# Patient Record
Sex: MALE | Race: WHITE | NOT HISPANIC OR LATINO | Employment: FULL TIME | URBAN - METROPOLITAN AREA
[De-identification: names, ages, dates, MRNs, and addresses within clinical notes are randomized per-mention and may not be internally consistent; named-entity substitution may affect disease eponyms.]

---

## 2017-01-09 RX ORDER — OMEPRAZOLE 20 MG/1
CAPSULE, DELAYED RELEASE ORAL
Qty: 90 CAPSULE | Refills: 0 | Status: SHIPPED | OUTPATIENT
Start: 2017-01-09 | End: 2017-04-16 | Stop reason: SDUPTHER

## 2017-01-09 RX ORDER — FENOFIBRATE 160 MG/1
TABLET ORAL
Qty: 90 TABLET | Refills: 0 | Status: SHIPPED | OUTPATIENT
Start: 2017-01-09 | End: 2017-04-01 | Stop reason: SDUPTHER

## 2017-01-23 RX ORDER — ALLOPURINOL 300 MG/1
TABLET ORAL
Qty: 90 TABLET | Refills: 0 | Status: SHIPPED | OUTPATIENT
Start: 2017-01-23 | End: 2017-04-30 | Stop reason: SDUPTHER

## 2017-03-06 ENCOUNTER — OFFICE VISIT (OUTPATIENT)
Dept: INTERNAL MEDICINE | Facility: CLINIC | Age: 82
End: 2017-03-06

## 2017-03-06 VITALS
SYSTOLIC BLOOD PRESSURE: 136 MMHG | RESPIRATION RATE: 18 BRPM | HEART RATE: 56 BPM | BODY MASS INDEX: 33.25 KG/M2 | DIASTOLIC BLOOD PRESSURE: 70 MMHG | TEMPERATURE: 97.8 F | WEIGHT: 266 LBS

## 2017-03-06 DIAGNOSIS — K21.00 GASTROESOPHAGEAL REFLUX DISEASE WITH ESOPHAGITIS: ICD-10-CM

## 2017-03-06 DIAGNOSIS — E11.9 TYPE 2 DIABETES MELLITUS WITHOUT COMPLICATION, WITHOUT LONG-TERM CURRENT USE OF INSULIN (HCC): ICD-10-CM

## 2017-03-06 DIAGNOSIS — E78.5 HYPERLIPIDEMIA, UNSPECIFIED HYPERLIPIDEMIA TYPE: ICD-10-CM

## 2017-03-06 DIAGNOSIS — I10 ESSENTIAL HYPERTENSION: Primary | ICD-10-CM

## 2017-03-06 LAB
ALBUMIN SERPL-MCNC: 4.2 G/DL (ref 3.2–4.8)
ALBUMIN/GLOB SERPL: 1.4 G/DL (ref 1.5–2.5)
ALP SERPL-CCNC: 74 U/L (ref 25–100)
ALT SERPL W P-5'-P-CCNC: 33 U/L (ref 7–40)
ANION GAP SERPL CALCULATED.3IONS-SCNC: 7 MMOL/L (ref 3–11)
ARTICHOKE IGE QN: 72 MG/DL (ref 0–130)
AST SERPL-CCNC: 30 U/L (ref 0–33)
BILIRUB BLD-MCNC: NEGATIVE MG/DL
BILIRUB SERPL-MCNC: 0.7 MG/DL (ref 0.3–1.2)
BUN BLD-MCNC: 27 MG/DL (ref 9–23)
BUN/CREAT SERPL: 30 (ref 7–25)
CALCIUM SPEC-SCNC: 9.9 MG/DL (ref 8.7–10.4)
CHLORIDE SERPL-SCNC: 104 MMOL/L (ref 99–109)
CHOLEST SERPL-MCNC: 117 MG/DL (ref 0–200)
CLARITY, POC: CLEAR
CO2 SERPL-SCNC: 29 MMOL/L (ref 20–31)
COLOR UR: YELLOW
CREAT BLD-MCNC: 0.9 MG/DL (ref 0.6–1.3)
EXPIRATION DATE: NORMAL
EXPIRATION DATE: NORMAL
GFR SERPL CREATININE-BSD FRML MDRD: 81 ML/MIN/1.73
GLOBULIN UR ELPH-MCNC: 3 GM/DL
GLUCOSE BLD-MCNC: 155 MG/DL (ref 70–100)
GLUCOSE UR STRIP-MCNC: NEGATIVE MG/DL
HBA1C MFR BLD: 7.2 % (ref 4.8–5.6)
HDLC SERPL-MCNC: 27 MG/DL (ref 40–60)
KETONES UR QL: NEGATIVE
LEUKOCYTE EST, POC: NEGATIVE
Lab: NORMAL
Lab: NORMAL
NITRITE UR-MCNC: NEGATIVE MG/ML
PH UR: 5 [PH] (ref 5–8)
POC CREATININE URINE: 50
POC MICROALBUMIN URINE: 30
POTASSIUM BLD-SCNC: 4.1 MMOL/L (ref 3.5–5.5)
PROT SERPL-MCNC: 7.2 G/DL (ref 5.7–8.2)
PROT UR STRIP-MCNC: NEGATIVE MG/DL
RBC # UR STRIP: NEGATIVE /UL
SODIUM BLD-SCNC: 140 MMOL/L (ref 132–146)
SP GR UR: 1.02 (ref 1–1.03)
TRIGL SERPL-MCNC: 263 MG/DL (ref 0–150)
UROBILINOGEN UR QL: NORMAL

## 2017-03-06 PROCEDURE — 80061 LIPID PANEL: CPT | Performed by: INTERNAL MEDICINE

## 2017-03-06 PROCEDURE — 82044 UR ALBUMIN SEMIQUANTITATIVE: CPT | Performed by: INTERNAL MEDICINE

## 2017-03-06 PROCEDURE — 83036 HEMOGLOBIN GLYCOSYLATED A1C: CPT | Performed by: INTERNAL MEDICINE

## 2017-03-06 PROCEDURE — 36415 COLL VENOUS BLD VENIPUNCTURE: CPT | Performed by: INTERNAL MEDICINE

## 2017-03-06 PROCEDURE — 99214 OFFICE O/P EST MOD 30 MIN: CPT | Performed by: INTERNAL MEDICINE

## 2017-03-06 PROCEDURE — 80053 COMPREHEN METABOLIC PANEL: CPT | Performed by: INTERNAL MEDICINE

## 2017-03-06 PROCEDURE — 81003 URINALYSIS AUTO W/O SCOPE: CPT | Performed by: INTERNAL MEDICINE

## 2017-03-06 NOTE — PROGRESS NOTES
Chief Complaint   Patient presents with   • Follow-up     4 MONTH       History of Present Illness      The patient presents for a follow-up related to diabetes and reports that he doesn't check his blood sugars at home. He denies hypoglycemic symptoms. The patient denies polyuria, polydypsia or polyphagia. The patient had an eye examination in 2013. He usually sees an ophthalmologist for his eye examinations. The eye examinations have revealed no retinopathy. He reports no symptoms of neuropathy. The patient takes his medication as prescribed. He is not taking insulin. The patient does check his feet daily at home. He denies chest pain, shortness of breath, dyspnea on exertion, edema, palpitations or syncope.    The patient is on omeprazole for his gastroesophageal reflux. The medication is taken on a regular basis and gives complete relief of the symptoms. He reports no abdominal pain, belching, diarrhea, dysphagia, heartburn, nausea, rectal bleeding, vomiting or weight loss. The GERD has no known aggravating factors.    The patient presents for a follow-up related to hyperlipidemia. He is following a low fat diet. He reports that he is exercising. He is taking pravastatin. The patient is taking his medication as prescribed. He reports no medication side effects, including muscle cramps, abdominal pain, headaches or weakness.    The patient presents for a follow-up related to hypertension. The patient reports that he has had no headaches or blurred vision. He states that he is taking his medication as prescribed. He is not having medication side effects.    Review of Systems    CARDIOVASCULAR- Denies Claudication.    PULMONARY- Denies Wheezing, Sputum Production, Cough or Hemoptysis.    Medications      Current Outpatient Prescriptions:   •  allopurinol (ZYLOPRIM) 300 MG tablet, TAKE ONE TABLET BY MOUTH ONCE DAILY, Disp: 90 tablet, Rfl: 0  •  amLODIPine (NORVASC) 10 MG tablet, Take 1 tablet by mouth daily for 360  days., Disp: 90 tablet, Rfl: 3  •  apixaban (ELIQUIS) 5 MG tablet tablet, Take 1 tablet by mouth 2 (two) times a day for 360 days., Disp: 180 tablet, Rfl: 3  •  Ascorbic Acid (VITAMIN C) 500 MG capsule, Take 1 tablet by mouth 4 (four) times a day., Disp: , Rfl:   •  docusate sodium (COLACE) 100 MG capsule, Take 300 mg by mouth every night., Disp: , Rfl:   •  fenofibrate 160 MG tablet, TAKE ONE TABLET BY MOUTH ONCE DAILY, Disp: 90 tablet, Rfl: 0  •  Ferrous Gluconate (IRON) 240 (27 FE) MG tablet, Take 1 tablet by mouth daily., Disp: , Rfl:   •  finasteride (PROSCAR) 5 MG tablet, Take 5 mg by mouth every night., Disp: , Rfl:   •  hydrochlorothiazide (MICROZIDE) 12.5 MG capsule, Take 1 capsule by mouth Every Morning., Disp: 90 capsule, Rfl: 3  •  lisinopril (PRINIVIL,ZESTRIL) 40 MG tablet, Take 1 tablet by mouth daily for 360 days., Disp: 90 tablet, Rfl: 3  •  metFORMIN (GLUCOPHAGE) 1000 MG tablet, TAKE ONE TABLET BY MOUTH TWICE DAILY, Disp: 60 tablet, Rfl: 5  •  methenamine (HIPREX) 1 G tablet, Take 500 mg by mouth 2 (two) times a day., Disp: , Rfl:   •  omeprazole (priLOSEC) 20 MG capsule, TAKE ONE CAPSULE BY MOUTH ONCE DAILY, Disp: 90 capsule, Rfl: 0  •  pravastatin (PRAVACHOL) 40 MG tablet, Take 1 tablet by mouth daily for 360 days., Disp: 90 tablet, Rfl: 3  •  sotalol (BETAPACE AF) 80 MG tablet tablet, Take 1 tablet by mouth every 12 (twelve) hours for 360 days., Disp: 180 tablet, Rfl: 3  •  tamsulosin (FLOMAX) 0.4 MG capsule 24 hr capsule, Take 1 capsule by mouth daily., Disp: , Rfl:      Allergies    Allergies   Allergen Reactions   • Penicillins Hives       Problem List    Patient Active Problem List   Diagnosis   • Atopic rhinitis   • Arrhythmia   • Benign (familial) paraproteinemia   • Diabetes mellitus   • Enlarged prostate without lower urinary tract symptoms (luts)   • Gastroesophageal reflux disease with esophagitis   • Polyp of gallbladder   • Gout   • Hematuria   • Liver cyst   • Hyperlipidemia   •  Essential hypertension   • Nephrolithiasis   • Obstructive sleep apnea syndrome   • Paroxysmal atrial fibrillation   • Ingrowing toenail       Physical Examination    Visit Vitals   • /70 (BP Location: Left arm, Patient Position: Sitting, Cuff Size: Adult)   • Pulse 56   • Temp 97.8 °F (36.6 °C) (Temporal Artery )   • Resp 18   • Wt 266 lb (121 kg)   • BMI 33.25 kg/m2       HEENT: Head- Normocephalic Atraumatic. Facies- Within normal limits. Pinnas- Normal texture and shape bilaterally. Canals- Normal bilaterally. TMs- Normal bilaterally. Nares- Patent bilaterally. Nasal Septum- is normal. There is no tenderness to palpation over the frontal or maxillary sinuses. Lids- Normal bilaterally. Conjunctiva- Clear bilaterally. Sclera- Anicteric bilaterally. Oropharynx- Moist with no lesions. Tonsils- No enlargement, erythema or exudate.    Neck: Thyroid- non enlarged, symmetric and has no nodules. No bruits are detected. ROM- Normal Range of Motion with no rigidity.    Lungs: Auscultation- Clear to auscultation bilaterally. There are no retractions, clubbing or cyanosis. The Expiratory to Inspiratory ratio is equal.    Cardiovascular: There are no carotid bruits. Heart- Normal Rate with Regular rhythm and no murmurs. There are no gallops. There are no rubs. In the lower extremities there is no edema. The upper extremities do not have edema.    Abdomen: Soft, benign, non-tender with no masses, hernias, organomegaly or scars.    Feet: The feet are symmetric with normal ericka landmarks. There is no tenderness to palpation bilaterally. The feet have normal posterior tibial and dorsalis pedis pulses and normal capillary refill bilaterally. The monofilament examination is normal bilaterally.       The arches are normal bilaterally. There are no skin or nail lesions present. There are no ingrown nails. There are no bunions noted.    Impression and Assessment    Diabetes Mellitus Type 2- controlled.    Gastroesophageal  Reflux Disease.     Hyperlipidemia.     Hypertension.     Plan    Gastroesophageal Reflux Disease Plan: The current plan was continued.    Hyperlipidemia Plan: He was instructed to eat a low fat diet. He was encouraged to exercise daily. The patient was instructed to continue the current medications.    Hypertension Plan: The current plan was continued.    Diabetes Mellitus Type 2- controlled Plan: He was referred to ophthalmology. The patient was instructed to continue the current medications.    Darien was seen today for follow-up.    Diagnoses and all orders for this visit:    Essential hypertension  -     Comprehensive Metabolic Panel  -     POC Urinalysis Dipstick, Automated    Gastroesophageal reflux disease with esophagitis    Type 2 diabetes mellitus without complication, without long-term current use of insulin  -     Comprehensive Metabolic Panel  -     Hemoglobin A1c  -     POC Urinalysis Dipstick, Automated  -     POC Microalbumin  -     Ambulatory Referral to Ophthalmology    Hyperlipidemia, unspecified hyperlipidemia type  -     Comprehensive Metabolic Panel  -     Lipid Panel        Return to Office    The patient was instructed to return for follow-up in 4 months. Next Visit: Follow-up.    The patient was instructed to return sooner if the condition changes, worsens, or doesn't resolve.

## 2017-04-03 RX ORDER — FENOFIBRATE 160 MG/1
TABLET ORAL
Qty: 90 TABLET | Refills: 0 | Status: SHIPPED | OUTPATIENT
Start: 2017-04-03 | End: 2017-07-08 | Stop reason: SDUPTHER

## 2017-04-17 RX ORDER — OMEPRAZOLE 20 MG/1
CAPSULE, DELAYED RELEASE ORAL
Qty: 90 CAPSULE | Refills: 0 | Status: SHIPPED | OUTPATIENT
Start: 2017-04-17 | End: 2017-07-08 | Stop reason: SDUPTHER

## 2017-05-01 RX ORDER — ALLOPURINOL 300 MG/1
TABLET ORAL
Qty: 90 TABLET | Refills: 0 | Status: SHIPPED | OUTPATIENT
Start: 2017-05-01 | End: 2017-07-22 | Stop reason: SDUPTHER

## 2017-07-10 RX ORDER — OMEPRAZOLE 20 MG/1
CAPSULE, DELAYED RELEASE ORAL
Qty: 90 CAPSULE | Refills: 0 | Status: SHIPPED | OUTPATIENT
Start: 2017-07-10 | End: 2017-10-14 | Stop reason: SDUPTHER

## 2017-07-10 RX ORDER — FENOFIBRATE 160 MG/1
TABLET ORAL
Qty: 90 TABLET | Refills: 0 | Status: SHIPPED | OUTPATIENT
Start: 2017-07-10 | End: 2017-08-22

## 2017-07-20 PROBLEM — N18.9 CHRONIC KIDNEY DISEASE: Status: ACTIVE | Noted: 2017-07-20

## 2017-07-24 RX ORDER — ALLOPURINOL 300 MG/1
TABLET ORAL
Qty: 90 TABLET | Refills: 0 | Status: SHIPPED | OUTPATIENT
Start: 2017-07-24 | End: 2017-11-06 | Stop reason: SDUPTHER

## 2017-07-31 ENCOUNTER — OFFICE VISIT (OUTPATIENT)
Dept: INTERNAL MEDICINE | Facility: CLINIC | Age: 82
End: 2017-07-31

## 2017-07-31 VITALS
RESPIRATION RATE: 16 BRPM | SYSTOLIC BLOOD PRESSURE: 122 MMHG | TEMPERATURE: 97.7 F | HEART RATE: 52 BPM | DIASTOLIC BLOOD PRESSURE: 64 MMHG | WEIGHT: 261.2 LBS | BODY MASS INDEX: 32.65 KG/M2

## 2017-07-31 DIAGNOSIS — E11.9 TYPE 2 DIABETES MELLITUS WITHOUT COMPLICATION, WITHOUT LONG-TERM CURRENT USE OF INSULIN (HCC): ICD-10-CM

## 2017-07-31 DIAGNOSIS — I10 ESSENTIAL HYPERTENSION: Primary | ICD-10-CM

## 2017-07-31 DIAGNOSIS — E78.5 HYPERLIPIDEMIA, UNSPECIFIED HYPERLIPIDEMIA TYPE: ICD-10-CM

## 2017-07-31 DIAGNOSIS — K21.00 GASTROESOPHAGEAL REFLUX DISEASE WITH ESOPHAGITIS: ICD-10-CM

## 2017-07-31 LAB
ALBUMIN SERPL-MCNC: 4.3 G/DL (ref 3.2–4.8)
ALBUMIN/GLOB SERPL: 1.6 G/DL (ref 1.5–2.5)
ALP SERPL-CCNC: 63 U/L (ref 25–100)
ALT SERPL W P-5'-P-CCNC: 32 U/L (ref 7–40)
ANION GAP SERPL CALCULATED.3IONS-SCNC: 5 MMOL/L (ref 3–11)
ARTICHOKE IGE QN: 66 MG/DL (ref 0–130)
AST SERPL-CCNC: 27 U/L (ref 0–33)
BILIRUB SERPL-MCNC: 0.7 MG/DL (ref 0.3–1.2)
BUN BLD-MCNC: 29 MG/DL (ref 9–23)
BUN/CREAT SERPL: 32.2 (ref 7–25)
CALCIUM SPEC-SCNC: 9.8 MG/DL (ref 8.7–10.4)
CHLORIDE SERPL-SCNC: 107 MMOL/L (ref 99–109)
CHOLEST SERPL-MCNC: 111 MG/DL (ref 0–200)
CO2 SERPL-SCNC: 29 MMOL/L (ref 20–31)
CREAT BLD-MCNC: 0.9 MG/DL (ref 0.6–1.3)
GFR SERPL CREATININE-BSD FRML MDRD: 81 ML/MIN/1.73
GLOBULIN UR ELPH-MCNC: 2.7 GM/DL
GLUCOSE BLD-MCNC: 137 MG/DL (ref 70–100)
HBA1C MFR BLD: 6.7 % (ref 4.8–5.6)
HDLC SERPL-MCNC: 25 MG/DL (ref 40–60)
POTASSIUM BLD-SCNC: 4.3 MMOL/L (ref 3.5–5.5)
PROT SERPL-MCNC: 7 G/DL (ref 5.7–8.2)
SODIUM BLD-SCNC: 141 MMOL/L (ref 132–146)
TRIGL SERPL-MCNC: 179 MG/DL (ref 0–150)

## 2017-07-31 PROCEDURE — 36415 COLL VENOUS BLD VENIPUNCTURE: CPT | Performed by: INTERNAL MEDICINE

## 2017-07-31 PROCEDURE — 83036 HEMOGLOBIN GLYCOSYLATED A1C: CPT | Performed by: INTERNAL MEDICINE

## 2017-07-31 PROCEDURE — 99214 OFFICE O/P EST MOD 30 MIN: CPT | Performed by: INTERNAL MEDICINE

## 2017-07-31 PROCEDURE — 80061 LIPID PANEL: CPT | Performed by: INTERNAL MEDICINE

## 2017-07-31 PROCEDURE — 80053 COMPREHEN METABOLIC PANEL: CPT | Performed by: INTERNAL MEDICINE

## 2017-07-31 NOTE — PROGRESS NOTES
Chief Complaint   Patient presents with   • Follow-up     4 month       History of Present Illness    The patient presents for a follow-up related to hyperlipidemia. He is following a low fat diet. He reports that he is exercising. He is taking fenofibrate and pravastatin. The patient is taking his medication as prescribed. He reports no medication side effects, including muscle cramps, abdominal pain, headaches or weakness. He denies chest pain, shortness of breath, orthopnea, paroxysmal nocturnal dyspnea, dyspnea on exertion, edema, palpitations or syncope.    The patient presents for a follow-up related to hypertension. The patient reports that he has had no headaches or blurred vision. He states that he is taking his medication as prescribed. He is not having medication side effects. He checks his blood pressures at home. The home readings are normal.    The patient is on omeprazole for his gastroesophageal reflux. The medication is taken on a regular basis and gives complete relief of the symptoms. He reports no abdominal pain, belching, diarrhea, dysphagia, early satiety, heartburn, hoarseness, nausea, odynophagia, rectal bleeding, vomiting or weight loss. The GERD has no known aggravating factors.    The patient presents for a follow-up related to diabetes and reports that he checks his blood sugars at home. His sugars are checked infrequently. The average sugars are in the 100-150 range. He denies hypoglycemic symptoms. The patient denies polyuria, polydypsia or polyphagia. The patient had an eye examination in 2017. He usually sees an ophthalmologist for his eye examinations. The eye examinations have revealed no retinopathy. He reports no symptoms of neuropathy. The patient takes his medication as prescribed. He is not taking insulin. The patient does check his feet daily at home.    Review of Systems    GENERAL- Denies Fever, Chills, Sweats, Fatigue, Weakness or Malaise.    CARDIOVASCULAR- Denies  Claudication.    GASTROINTESTINAL- Denies Constipation.    PULMONARY- Denies Wheezing, Sputum Production, Cough, Hemoptysis or Pleuritic Chest Pain.    Medications      Current Outpatient Prescriptions:   •  allopurinol (ZYLOPRIM) 300 MG tablet, TAKE ONE TABLET BY MOUTH ONCE DAILY, Disp: 90 tablet, Rfl: 0  •  amLODIPine (NORVASC) 10 MG tablet, Take 1 tablet by mouth daily for 360 days., Disp: 90 tablet, Rfl: 3  •  apixaban (ELIQUIS) 5 MG tablet tablet, Take 1 tablet by mouth 2 (two) times a day for 360 days., Disp: 180 tablet, Rfl: 3  •  Ascorbic Acid (VITAMIN C) 500 MG capsule, Take 1 tablet by mouth 4 (four) times a day., Disp: , Rfl:   •  docusate sodium (COLACE) 100 MG capsule, Take 300 mg by mouth every night., Disp: , Rfl:   •  fenofibrate 160 MG tablet, TAKE ONE TABLET BY MOUTH ONCE DAILY, Disp: 90 tablet, Rfl: 0  •  Ferrous Gluconate (IRON) 240 (27 FE) MG tablet, Take 1 tablet by mouth daily., Disp: , Rfl:   •  finasteride (PROSCAR) 5 MG tablet, Take 5 mg by mouth every night., Disp: , Rfl:   •  hydrochlorothiazide (MICROZIDE) 12.5 MG capsule, Take 1 capsule by mouth Every Morning., Disp: 90 capsule, Rfl: 3  •  lisinopril (PRINIVIL,ZESTRIL) 40 MG tablet, Take 1 tablet by mouth daily for 360 days., Disp: 90 tablet, Rfl: 3  •  metFORMIN (GLUCOPHAGE) 1000 MG tablet, TAKE ONE TABLET BY MOUTH TWICE DAILY, Disp: 60 tablet, Rfl: 5  •  methenamine (HIPREX) 1 G tablet, Take 500 mg by mouth 2 (two) times a day., Disp: , Rfl:   •  omeprazole (priLOSEC) 20 MG capsule, TAKE ONE CAPSULE BY MOUTH ONCE DAILY, Disp: 90 capsule, Rfl: 0  •  pravastatin (PRAVACHOL) 40 MG tablet, Take 1 tablet by mouth daily for 360 days., Disp: 90 tablet, Rfl: 3  •  tamsulosin (FLOMAX) 0.4 MG capsule 24 hr capsule, Take 1 capsule by mouth daily., Disp: , Rfl:   •  sotalol (BETAPACE AF) 80 MG tablet tablet, Take 1 tablet by mouth every 12 (twelve) hours for 360 days., Disp: 180 tablet, Rfl: 3     Allergies    Allergies   Allergen Reactions   •  Penicillins Hives   • Xarelto [Rivaroxaban]      GI bleed       Problem List    Patient Active Problem List   Diagnosis   • Atopic rhinitis   • Arrhythmia   • Benign (familial) paraproteinemia   • Diabetes mellitus   • Enlarged prostate without lower urinary tract symptoms (luts)   • Gastroesophageal reflux disease with esophagitis   • Polyp of gallbladder   • Gout   • Hematuria   • Liver cyst   • Hyperlipidemia   • Essential hypertension   • Nephrolithiasis   • Obstructive sleep apnea syndrome   • Paroxysmal atrial fibrillation   • Ingrowing toenail   • Chronic kidney disease       Medications, Allergies, Problems List and Past History were reviewed and updated.    Physical Examination    /64 (BP Location: Left arm, Patient Position: Sitting, Cuff Size: Adult)  Pulse 52  Temp 97.7 °F (36.5 °C) (Temporal Artery )   Resp 16  Wt 261 lb 3.2 oz (118 kg)  BMI 32.65 kg/m2      HEENT: Head- Normocephalic Atraumatic. Facies- Within normal limits. Pinnas- Normal texture and shape bilaterally. Canals- Normal bilaterally. TMs- Normal bilaterally. Nares- Patent bilaterally. Nasal Septum- is normal. There is no tenderness to palpation over the frontal or maxillary sinuses. Lids- Normal bilaterally. Conjunctiva- Clear bilaterally. Sclera- Anicteric bilaterally. Oropharynx- Moist with no lesions. Tonsils- No enlargement, erythema or exudate.    Neck: Thyroid- non enlarged, symmetric and has no nodules. No bruits are detected. ROM- Normal Range of Motion with no rigidity.    Lungs: Auscultation- Clear to auscultation bilaterally. There are no retractions, clubbing or cyanosis. The Expiratory to Inspiratory ratio is equal.    Cardiovascular: There are no carotid bruits. Heart- Normal Rate with Regular rhythm and no murmurs. There are no gallops. There are no rubs. In the lower extremities there is no edema. The upper extremities do not have edema.    Abdomen: Soft, benign, non-tender with no masses, hernias, organomegaly  or scars.    Impression and Assessment    Hyperlipidemia.    Essential Hypertension.    Gastroesophageal Reflux Disease.    Type 2 Diabetes Mellitus without complication.    Plan    Gastroesophageal Reflux Disease Plan: The current plan was continued.    Hyperlipidemia Plan: He was instructed to eat a low fat diet. He was encouraged to exercise daily. The patient was instructed to continue the current medications.    Essential Hypertension Plan: The current plan was continued.    Type 2 Diabetes Mellitus without complication Plan: Further plans will be formulated after test results are reviewed.    Darien was seen today for follow-up.    Diagnoses and all orders for this visit:    Essential hypertension  -     Comprehensive Metabolic Panel    Gastroesophageal reflux disease with esophagitis  -     Comprehensive Metabolic Panel    Type 2 diabetes mellitus without complication, without long-term current use of insulin  -     Comprehensive Metabolic Panel  -     Hemoglobin A1c    Hyperlipidemia, unspecified hyperlipidemia type  -     Comprehensive Metabolic Panel  -     Lipid Panel        Return to Office    The patient was instructed to return for follow-up in 4 months.    The patient was instructed to return sooner if the condition changes, worsens, or doesn't resolve.

## 2017-08-10 ENCOUNTER — OFFICE VISIT (OUTPATIENT)
Dept: INTERNAL MEDICINE | Facility: CLINIC | Age: 82
End: 2017-08-10

## 2017-08-10 VITALS
DIASTOLIC BLOOD PRESSURE: 64 MMHG | SYSTOLIC BLOOD PRESSURE: 126 MMHG | TEMPERATURE: 97.3 F | OXYGEN SATURATION: 95 % | HEART RATE: 52 BPM | RESPIRATION RATE: 16 BRPM | WEIGHT: 263 LBS | BODY MASS INDEX: 32.87 KG/M2

## 2017-08-10 DIAGNOSIS — Z00.00 INITIAL MEDICARE ANNUAL WELLNESS VISIT: Primary | ICD-10-CM

## 2017-08-10 PROCEDURE — G0438 PPPS, INITIAL VISIT: HCPCS | Performed by: PHYSICIAN ASSISTANT

## 2017-08-10 NOTE — PROGRESS NOTES
Subjective   Darien Camarena is a 81 y.o. male.   Chief Complaint   Patient presents with   • Annual Exam       History of Present Illness     The following portions of the patient's history were reviewed and updated as appropriate: {history reviewed:20406}.    Review of Systems    Objective   Physical Exam    Assessment/Plan   There are no diagnoses linked to this encounter.

## 2017-08-10 NOTE — PATIENT INSTRUCTIONS
Pls get shingles vaccine at your pharmacy and have them fax us that it was done.    Pls get us a copy of your advanced directive.

## 2017-08-10 NOTE — PROGRESS NOTES
QUICK REFERENCE INFORMATION:  The ABCs of the Annual Wellness Visit    Initial Medicare Wellness Visit    HEALTH RISK ASSESSMENT    1935    Recent Hospitalizations:  No hospitalization(s) within the last year..        Current Medical Providers:  Patient Care Team:  Pito Way MD as PCP - General  Pito Way MD as PCP - Family Medicine  Anthony Sharp MD as PCP - Claims Attributed  Titus Oliveros MD as Consulting Physician (Cardiology)        Smoking Status:  History   Smoking Status   • Former Smoker   • Quit date: 5/16/1960   Smokeless Tobacco   • Not on file     Comment: started at 15 yo.       Alcohol Consumption:  History   Alcohol Use No       Depression Screen:   PHQ-9 Depression Screening 8/10/2017   Little interest or pleasure in doing things 0   Feeling down, depressed, or hopeless 0   PHQ-9 Total Score 0       Health Habits and Functional and Cognitive Screening:  Functional & Cognitive Status 8/10/2017   Do you have difficulty preparing food and eating? No   Do you have difficulty bathing yourself? No   Do you have difficulty getting dressed? No   Do you have difficulty using the toilet? No   Do you have difficulty moving around from place to place? No   In the past year have you fallen or experienced a near fall? No   Do you need help using the phone?  No   Are you deaf or do you have serious difficulty hearing?  No   Do you need help with transportation? No   Do you need help shopping? No   Do you need help preparing meals?  No   Do you need help with housework?  No   Do you need help with laundry? No   Do you need help taking your medications? No   Do you need help managing money? No       Health Habits  Current Diet: Well Balanced Diet  Exercise (times per week): 0 times per week  Current Exercise Activities Include: None  Pt continues to         Does the patient have evidence of cognitive impairment? No    Asiprin use counseling: pt on eliquis  Recent Lab  Results:    Visual Acuity:  Saw eye doctor 1 month ago.  No exam data present    Age-appropriate Screening Schedule:  Refer to the list below for future screening recommendations based on patient's age, sex and/or medical conditions. Orders for these recommended tests are listed in the plan section. The patient has been provided with a written plan.    Health Maintenance   Topic Date Due   • ZOSTER VACCINE  05/16/2016   • INFLUENZA VACCINE  09/01/2017   • HEMOGLOBIN A1C  01/31/2018   • DIABETIC FOOT EXAM  03/06/2018   • DIABETIC EYE EXAM  05/01/2018   • LIPID PANEL  07/31/2018   • TDAP/TD VACCINES (2 - Td) 03/06/2027   • PNEUMOCOCCAL VACCINES (65+ LOW/MEDIUM RISK)  Completed        Subjective   History of Present Illness    Darien Camarena is a 81 y.o. male who presents for an Annual Wellness Visit.    The following portions of the patient's history were reviewed and updated as appropriate: allergies, current medications, past family history, past medical history, past social history, past surgical history and problem list.    Outpatient Medications Prior to Visit   Medication Sig Dispense Refill   • allopurinol (ZYLOPRIM) 300 MG tablet TAKE ONE TABLET BY MOUTH ONCE DAILY 90 tablet 0   • amLODIPine (NORVASC) 10 MG tablet Take 1 tablet by mouth daily for 360 days. 90 tablet 3   • apixaban (ELIQUIS) 5 MG tablet tablet Take 1 tablet by mouth 2 (two) times a day for 360 days. 180 tablet 3   • Ascorbic Acid (VITAMIN C) 500 MG capsule Take 1 tablet by mouth 4 (four) times a day.     • docusate sodium (COLACE) 100 MG capsule Take 300 mg by mouth every night.     • fenofibrate 160 MG tablet TAKE ONE TABLET BY MOUTH ONCE DAILY 90 tablet 0   • Ferrous Gluconate (IRON) 240 (27 FE) MG tablet Take 1 tablet by mouth daily.     • finasteride (PROSCAR) 5 MG tablet Take 5 mg by mouth every night.     • hydrochlorothiazide (MICROZIDE) 12.5 MG capsule Take 1 capsule by mouth Every Morning. 90 capsule 3   • lisinopril (PRINIVIL,ZESTRIL) 40  MG tablet Take 1 tablet by mouth daily for 360 days. 90 tablet 3   • metFORMIN (GLUCOPHAGE) 1000 MG tablet TAKE ONE TABLET BY MOUTH TWICE DAILY 60 tablet 5   • methenamine (HIPREX) 1 G tablet Take 500 mg by mouth 2 (two) times a day.     • omeprazole (priLOSEC) 20 MG capsule TAKE ONE CAPSULE BY MOUTH ONCE DAILY 90 capsule 0   • pravastatin (PRAVACHOL) 40 MG tablet Take 1 tablet by mouth daily for 360 days. 90 tablet 3   • sotalol (BETAPACE AF) 80 MG tablet tablet Take 1 tablet by mouth every 12 (twelve) hours for 360 days. 180 tablet 3   • tamsulosin (FLOMAX) 0.4 MG capsule 24 hr capsule Take 1 capsule by mouth daily.       No facility-administered medications prior to visit.        Patient Active Problem List   Diagnosis   • Atopic rhinitis   • Arrhythmia   • Benign (familial) paraproteinemia   • Diabetes mellitus   • Enlarged prostate without lower urinary tract symptoms (luts)   • Gastroesophageal reflux disease with esophagitis   • Polyp of gallbladder   • Gout   • Hematuria   • Liver cyst   • Hyperlipidemia   • Essential hypertension   • Nephrolithiasis   • Obstructive sleep apnea syndrome   • Paroxysmal atrial fibrillation   • Ingrowing toenail   • Chronic kidney disease       Advance Care Planning:  has an advance directive - a copy HAS NOT been provided. Have asked the patient to send this to us to add to record.    Identification of Risk Factors:  Risk factors include: weight , cardiovascular risk and polypharmacy.    Review of Systems   Skin:        Finger nail problem from trauma   Neurological: Negative for headaches.       Compared to one year ago, the patient feels his physical health is the same.  Compared to one year ago, the patient feels his mental health is the same.    Objective     Physical Exam   Constitutional: He appears well-developed and well-nourished.   HENT:   Head: Normocephalic and atraumatic.   Right Ear: External ear normal.   Left Ear: External ear normal.   Eyes: Conjunctivae are  normal.   Cardiovascular: Normal rate, regular rhythm and normal heart sounds.  Exam reveals no gallop and no friction rub.    No murmur heard.  Pulmonary/Chest: Effort normal and breath sounds normal.   Psychiatric: He has a normal mood and affect.   Vitals reviewed.      Vitals:    08/10/17 0803   BP: 126/64   BP Location: Right arm   Patient Position: Sitting   Pulse: 52   Resp: 16   Temp: 97.3 °F (36.3 °C)   SpO2: 95%   Weight: 263 lb (119 kg)   PainSc: 0-No pain       Body mass index is 32.87 kg/(m^2).  Discussed the patient's BMI with him. The BMI is above average; BMI management plan is completed.    Assessment/Plan   Patient Self-Management and Personalized Health Advice  The patient has been provided with information about: exercise and weight management and preventive services including:   · Zostavax vaccine (Herpes Zoster).    Visit Diagnoses:    ICD-10-CM ICD-9-CM   1. Initial Medicare annual wellness visit Z00.00 V70.0       No orders of the defined types were placed in this encounter.      Outpatient Encounter Prescriptions as of 8/10/2017   Medication Sig Dispense Refill   • allopurinol (ZYLOPRIM) 300 MG tablet TAKE ONE TABLET BY MOUTH ONCE DAILY 90 tablet 0   • amLODIPine (NORVASC) 10 MG tablet Take 1 tablet by mouth daily for 360 days. 90 tablet 3   • apixaban (ELIQUIS) 5 MG tablet tablet Take 1 tablet by mouth 2 (two) times a day for 360 days. 180 tablet 3   • Ascorbic Acid (VITAMIN C) 500 MG capsule Take 1 tablet by mouth 4 (four) times a day.     • docusate sodium (COLACE) 100 MG capsule Take 300 mg by mouth every night.     • fenofibrate 160 MG tablet TAKE ONE TABLET BY MOUTH ONCE DAILY 90 tablet 0   • Ferrous Gluconate (IRON) 240 (27 FE) MG tablet Take 1 tablet by mouth daily.     • finasteride (PROSCAR) 5 MG tablet Take 5 mg by mouth every night.     • hydrochlorothiazide (MICROZIDE) 12.5 MG capsule Take 1 capsule by mouth Every Morning. 90 capsule 3   • lisinopril (PRINIVIL,ZESTRIL) 40 MG  tablet Take 1 tablet by mouth daily for 360 days. 90 tablet 3   • metFORMIN (GLUCOPHAGE) 1000 MG tablet TAKE ONE TABLET BY MOUTH TWICE DAILY 60 tablet 5   • methenamine (HIPREX) 1 G tablet Take 500 mg by mouth 2 (two) times a day.     • omeprazole (priLOSEC) 20 MG capsule TAKE ONE CAPSULE BY MOUTH ONCE DAILY 90 capsule 0   • pravastatin (PRAVACHOL) 40 MG tablet Take 1 tablet by mouth daily for 360 days. 90 tablet 3   • sotalol (BETAPACE AF) 80 MG tablet tablet Take 1 tablet by mouth every 12 (twelve) hours for 360 days. 180 tablet 3   • tamsulosin (FLOMAX) 0.4 MG capsule 24 hr capsule Take 1 capsule by mouth daily.       No facility-administered encounter medications on file as of 8/10/2017.        Reviewed use of high risk medication in the elderly: yes  Reviewed for potential of harmful drug interactions in the elderly: yes    Follow Up:  No Follow-up on file.     An After Visit Summary and PPPS with all of these plans were given to the patient.

## 2017-08-22 ENCOUNTER — OFFICE VISIT (OUTPATIENT)
Dept: CARDIOLOGY | Facility: CLINIC | Age: 82
End: 2017-08-22

## 2017-08-22 VITALS
DIASTOLIC BLOOD PRESSURE: 74 MMHG | HEART RATE: 63 BPM | SYSTOLIC BLOOD PRESSURE: 142 MMHG | WEIGHT: 262.6 LBS | BODY MASS INDEX: 32.65 KG/M2 | HEIGHT: 75 IN

## 2017-08-22 DIAGNOSIS — I10 ESSENTIAL HYPERTENSION: Chronic | ICD-10-CM

## 2017-08-22 DIAGNOSIS — I48.0 PAROXYSMAL ATRIAL FIBRILLATION (HCC): Primary | ICD-10-CM

## 2017-08-22 DIAGNOSIS — E78.5 HYPERLIPIDEMIA LDL GOAL <100: ICD-10-CM

## 2017-08-22 PROCEDURE — 93000 ELECTROCARDIOGRAM COMPLETE: CPT | Performed by: INTERNAL MEDICINE

## 2017-08-22 PROCEDURE — 99214 OFFICE O/P EST MOD 30 MIN: CPT | Performed by: INTERNAL MEDICINE

## 2017-08-22 RX ORDER — AMLODIPINE BESYLATE 10 MG/1
10 TABLET ORAL DAILY
COMMUNITY
End: 2017-08-22 | Stop reason: SDUPTHER

## 2017-08-22 RX ORDER — PRAVASTATIN SODIUM 40 MG
40 TABLET ORAL DAILY
COMMUNITY
End: 2017-08-22 | Stop reason: SDUPTHER

## 2017-08-22 RX ORDER — SOTALOL HYDROCHLORIDE 80 MG/1
80 TABLET ORAL 2 TIMES DAILY
COMMUNITY
End: 2017-08-22

## 2017-08-22 RX ORDER — AMLODIPINE BESYLATE 10 MG/1
10 TABLET ORAL DAILY
Qty: 90 TABLET | Refills: 3 | Status: SHIPPED | OUTPATIENT
Start: 2017-08-22 | End: 2018-10-06 | Stop reason: SDUPTHER

## 2017-08-22 RX ORDER — LISINOPRIL 40 MG/1
40 TABLET ORAL DAILY
Qty: 90 TABLET | Refills: 3 | Status: SHIPPED | OUTPATIENT
Start: 2017-08-22 | End: 2018-09-15 | Stop reason: SDUPTHER

## 2017-08-22 RX ORDER — LISINOPRIL 40 MG/1
40 TABLET ORAL DAILY
COMMUNITY
End: 2017-08-22 | Stop reason: SDUPTHER

## 2017-08-22 RX ORDER — PRAVASTATIN SODIUM 40 MG
40 TABLET ORAL DAILY
Qty: 90 TABLET | Refills: 3 | Status: SHIPPED | OUTPATIENT
Start: 2017-08-22 | End: 2017-11-30 | Stop reason: SDUPTHER

## 2017-08-22 RX ORDER — SOTALOL HYDROCHLORIDE 80 MG/1
80 TABLET ORAL 2 TIMES DAILY
Qty: 180 TABLET | Refills: 3 | Status: SHIPPED | OUTPATIENT
Start: 2017-08-22 | End: 2017-08-22 | Stop reason: SDUPTHER

## 2017-08-22 RX ORDER — SOTALOL HYDROCHLORIDE 80 MG/1
80 TABLET ORAL 2 TIMES DAILY
Qty: 180 TABLET | Refills: 3 | Status: SHIPPED | OUTPATIENT
Start: 2017-08-22 | End: 2017-11-30 | Stop reason: SDUPTHER

## 2017-08-22 RX ORDER — HYDROCHLOROTHIAZIDE 12.5 MG/1
12.5 CAPSULE, GELATIN COATED ORAL EVERY MORNING
Qty: 90 CAPSULE | Refills: 3 | Status: SHIPPED | OUTPATIENT
Start: 2017-08-22 | End: 2018-10-06 | Stop reason: SDUPTHER

## 2017-08-22 NOTE — ASSESSMENT & PLAN NOTE
· Asymptomatic  · Normal EKG with sinus rhythm today  · Continue sotalol for rhythm control  · Continue Eliquis for stroke prophylaxis

## 2017-08-22 NOTE — PROGRESS NOTES
Encounter Date:08/22/2017    Patient ID: Darien Camarena is a  81 y.o. male who resides in [place], [place].    CC/Reason for visit:  Hyperlipidemia and Hypertension          Darien Camarena returns to my office today as a follow up for atrial fibrillation, hypertension, and hyperlipidemia. Since last visit, patient has been feeling well overall from a cardiovascular standpoint. He denies chest pain. He denies TIA or stroke symptoms. He denies fluttering. He states his breathing has been doing well. 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    Review of Systems   Constitution: Negative for weakness and malaise/fatigue.   Eyes: Negative for vision loss in left eye and vision loss in right eye.   Cardiovascular: Negative for chest pain, dyspnea on exertion, near-syncope, orthopnea, palpitations, paroxysmal nocturnal dyspnea and syncope.   Hematologic/Lymphatic: Bruises/bleeds easily.   Musculoskeletal: Negative for myalgias.   Neurological: Negative for brief paralysis, excessive daytime sleepiness, focal weakness, numbness and paresthesias.   All other systems reviewed and are negative.      The patient's past medical, social, family history and ROS reviewed in the patient's electronic medical record.    Allergies  Penicillins and Xarelto [rivaroxaban]    Outpatient Prescriptions Marked as Taking for the 8/22/17 encounter (Office Visit) with Titus Oliveros MD   Medication Sig Dispense Refill   • allopurinol (ZYLOPRIM) 300 MG tablet TAKE ONE TABLET BY MOUTH ONCE DAILY 90 tablet 0   • amLODIPine (NORVASC) 10 MG tablet Take 1 tablet by mouth Daily for 360 days. 90 tablet 3   • apixaban (ELIQUIS) 5 MG tablet tablet Take 1 tablet by mouth Every 12 (Twelve) Hours for 360 days. 180 tablet 3   • Ascorbic Acid (VITAMIN C) 500 MG capsule Take 1 tablet by mouth 4 (four) times a day.     • docusate sodium (COLACE) 100 MG capsule Take 300 mg by mouth every night.     • Ferrous Gluconate (IRON) 240 (27  "FE) MG tablet Take 1 tablet by mouth daily.     • finasteride (PROSCAR) 5 MG tablet Take 5 mg by mouth every night.     • hydrochlorothiazide (MICROZIDE) 12.5 MG capsule Take 1 capsule by mouth Every Morning. 90 capsule 3   • lisinopril (PRINIVIL,ZESTRIL) 40 MG tablet Take 1 tablet by mouth Daily for 360 days. 90 tablet 3   • metFORMIN (GLUCOPHAGE) 1000 MG tablet TAKE ONE TABLET BY MOUTH TWICE DAILY 60 tablet 5   • methenamine (HIPREX) 1 G tablet Take 500 mg by mouth 2 (two) times a day.     • omeprazole (priLOSEC) 20 MG capsule TAKE ONE CAPSULE BY MOUTH ONCE DAILY 90 capsule 0   • pravastatin (PRAVACHOL) 40 MG tablet Take 1 tablet by mouth Daily for 360 days. 90 tablet 3   • Probiotic Product (PROBIOTIC ADVANCED PO) Take 1 tablet by mouth 2 (Two) Times a Day.     • sotalol (BETAPACE) 80 MG tablet Take 1 tablet by mouth 2 (Two) Times a Day for 360 days. 180 tablet 3   • tamsulosin (FLOMAX) 0.4 MG capsule 24 hr capsule Take 1 capsule by mouth daily.     • [DISCONTINUED] amLODIPine (NORVASC) 10 MG tablet Take 10 mg by mouth Daily.     • [DISCONTINUED] apixaban (ELIQUIS) 5 MG tablet tablet Take 5 mg by mouth 2 (Two) Times a Day.     • [DISCONTINUED] fenofibrate 160 MG tablet TAKE ONE TABLET BY MOUTH ONCE DAILY 90 tablet 0   • [DISCONTINUED] hydrochlorothiazide (MICROZIDE) 12.5 MG capsule Take 1 capsule by mouth Every Morning. 90 capsule 3   • [DISCONTINUED] lisinopril (PRINIVIL,ZESTRIL) 40 MG tablet Take 40 mg by mouth Daily.     • [DISCONTINUED] pravastatin (PRAVACHOL) 40 MG tablet Take 40 mg by mouth Daily.     • [DISCONTINUED] sotalol (BETAPACE) 80 MG tablet Take 80 mg by mouth 2 (Two) Times a Day.     • [DISCONTINUED] sotalol (BETAPACE) 80 MG tablet Take 1 tablet by mouth 2 (Two) Times a Day for 360 days. 180 tablet 3         Blood pressure 142/74, pulse 63, height 75\" (190.5 cm), weight 262 lb 9.6 oz (119 kg).  Body mass index is 32.82 kg/(m^2).    Physical Exam   Constitutional: He is oriented to person, place, " and time. He appears well-developed and well-nourished.   HENT:   Head: Normocephalic and atraumatic.   Eyes: Pupils are equal, round, and reactive to light. No scleral icterus.   Neck: No JVD present. Carotid bruit is not present. No thyromegaly present.   Cardiovascular: Normal rate, regular rhythm, S1 normal and S2 normal.  Exam reveals no gallop.    No murmur heard.  Pulmonary/Chest: Effort normal and breath sounds normal.   Abdominal: Soft. There is no hepatosplenomegaly. There is no tenderness.   Neurological: He is alert and oriented to person, place, and time.   Skin: Skin is warm and dry. No cyanosis. Nails show no clubbing.   Psychiatric: He has a normal mood and affect. His behavior is normal.       Data Review:     Lab Results   Component Value Date    CHOL 111 07/31/2017    TRIG 179 (H) 07/31/2017    HDL 25 (L) 07/31/2017    LDLDIRECT 66 07/31/2017    AST 27 07/31/2017    ALT 32 07/31/2017     Lab Results   Component Value Date    GLUCOSE 137 (H) 07/31/2017    BUN 29 (H) 07/31/2017    CREATININE 0.90 07/31/2017    EGFRIFNONA 81 07/31/2017    BCR 32.2 (H) 07/31/2017    K 4.3 07/31/2017    CO2 29.0 07/31/2017    CALCIUM 9.8 07/31/2017    ALBUMIN 4.30 07/31/2017    LABIL2 1.6 07/31/2017    AST 27 07/31/2017    ALT 32 07/31/2017     Lab Results   Component Value Date    HGBA1C 6.70 (H) 07/31/2017       ECG 12 Lead  Date/Time: 8/22/2017 3:46 PM  Performed by: ZAIN CHAVARRIA  Authorized by: ZAIN CHAVARRIA   Rhythm: sinus rhythm  BPM: 63  Clinical impression: normal ECG  Comments: QTc interval 435 ms                 Problem List Items Addressed This Visit        Cardiovascular and Mediastinum    Paroxysmal atrial fibrillation - Primary    Overview     · Diagnosed August 2012.   · FARHAD-guided ECV (08/17/2012).  · CHADS-VASc score is 5, on Eliquis.         Current Assessment & Plan     · Asymptomatic  · Normal EKG with sinus rhythm today  · Continue sotalol for rhythm control  · Continue  Eliquis for stroke prophylaxis         Relevant Medications    apixaban (ELIQUIS) 5 MG tablet tablet    sotalol (BETAPACE) 80 MG tablet    amLODIPine (NORVASC) 10 MG tablet    Other Relevant Orders    ECG 12 Lead    Essential hypertension (Chronic)    Current Assessment & Plan     · Well-controlled  · Continue present medical therapy         Relevant Medications    sotalol (BETAPACE) 80 MG tablet    amLODIPine (NORVASC) 10 MG tablet    hydrochlorothiazide (MICROZIDE) 12.5 MG capsule    lisinopril (PRINIVIL,ZESTRIL) 40 MG tablet    Hyperlipidemia LDL goal <100    Overview     · Moderate to high intensity statin therapy indicated given the presence diabetes         Current Assessment & Plan     · Continue pravastatin         Relevant Medications    pravastatin (PRAVACHOL) 40 MG tablet               · Continue present medical therapy  Return in about 1 year (around 8/22/2018).      Titus Oliveros MD  8/22/2017    Scribed for Titus Oliveros MD by Manuelito Hernandez. 8/22/2017  6:10 PM

## 2017-09-03 DIAGNOSIS — E78.5 HYPERLIPIDEMIA: ICD-10-CM

## 2017-09-05 RX ORDER — PRAVASTATIN SODIUM 40 MG
TABLET ORAL
Qty: 90 TABLET | Refills: 1 | Status: SHIPPED | OUTPATIENT
Start: 2017-09-05 | End: 2019-11-11 | Stop reason: SDUPTHER

## 2017-10-03 RX ORDER — FENOFIBRATE 160 MG/1
TABLET ORAL
Qty: 90 TABLET | Refills: 1 | Status: SHIPPED | OUTPATIENT
Start: 2017-10-03 | End: 2017-11-30

## 2017-10-08 DIAGNOSIS — I48.0 PAROXYSMAL ATRIAL FIBRILLATION (HCC): ICD-10-CM

## 2017-10-09 RX ORDER — SOTALOL HYDROCHLORIDE 80 MG/1
TABLET ORAL
Qty: 180 TABLET | Refills: 3 | Status: SHIPPED | OUTPATIENT
Start: 2017-10-09 | End: 2018-10-15 | Stop reason: SDUPTHER

## 2017-10-16 RX ORDER — OMEPRAZOLE 20 MG/1
CAPSULE, DELAYED RELEASE ORAL
Qty: 90 CAPSULE | Refills: 0 | Status: SHIPPED | OUTPATIENT
Start: 2017-10-16 | End: 2018-01-07 | Stop reason: SDUPTHER

## 2017-11-06 RX ORDER — ALLOPURINOL 300 MG/1
300 TABLET ORAL DAILY
Qty: 90 TABLET | Refills: 1 | Status: SHIPPED | OUTPATIENT
Start: 2017-11-06 | End: 2018-04-14 | Stop reason: SDUPTHER

## 2017-11-30 ENCOUNTER — OFFICE VISIT (OUTPATIENT)
Dept: INTERNAL MEDICINE | Facility: CLINIC | Age: 82
End: 2017-11-30

## 2017-11-30 VITALS
BODY MASS INDEX: 33 KG/M2 | HEART RATE: 56 BPM | WEIGHT: 264 LBS | TEMPERATURE: 97.3 F | SYSTOLIC BLOOD PRESSURE: 132 MMHG | DIASTOLIC BLOOD PRESSURE: 60 MMHG

## 2017-11-30 DIAGNOSIS — K21.00 GASTROESOPHAGEAL REFLUX DISEASE WITH ESOPHAGITIS: ICD-10-CM

## 2017-11-30 DIAGNOSIS — E11.9 TYPE 2 DIABETES MELLITUS WITHOUT COMPLICATION, WITHOUT LONG-TERM CURRENT USE OF INSULIN (HCC): ICD-10-CM

## 2017-11-30 DIAGNOSIS — G47.33 OBSTRUCTIVE SLEEP APNEA SYNDROME: ICD-10-CM

## 2017-11-30 DIAGNOSIS — E78.5 HYPERLIPIDEMIA, UNSPECIFIED HYPERLIPIDEMIA TYPE: ICD-10-CM

## 2017-11-30 DIAGNOSIS — I10 ESSENTIAL HYPERTENSION: Primary | ICD-10-CM

## 2017-11-30 LAB
ALBUMIN SERPL-MCNC: 4 G/DL (ref 3.2–4.8)
ALBUMIN/GLOB SERPL: 1.5 G/DL (ref 1.5–2.5)
ALP SERPL-CCNC: 59 U/L (ref 25–100)
ALT SERPL W P-5'-P-CCNC: 28 U/L (ref 7–40)
ANION GAP SERPL CALCULATED.3IONS-SCNC: 11 MMOL/L (ref 3–11)
ARTICHOKE IGE QN: 63 MG/DL (ref 0–130)
AST SERPL-CCNC: 24 U/L (ref 0–33)
BILIRUB SERPL-MCNC: 0.5 MG/DL (ref 0.3–1.2)
BUN BLD-MCNC: 24 MG/DL (ref 9–23)
BUN/CREAT SERPL: 30 (ref 7–25)
CALCIUM SPEC-SCNC: 9.1 MG/DL (ref 8.7–10.4)
CHLORIDE SERPL-SCNC: 108 MMOL/L (ref 99–109)
CHOLEST SERPL-MCNC: 100 MG/DL (ref 0–200)
CO2 SERPL-SCNC: 22 MMOL/L (ref 20–31)
CREAT BLD-MCNC: 0.8 MG/DL (ref 0.6–1.3)
GFR SERPL CREATININE-BSD FRML MDRD: 93 ML/MIN/1.73
GLOBULIN UR ELPH-MCNC: 2.6 GM/DL
GLUCOSE BLD-MCNC: 140 MG/DL (ref 70–100)
HBA1C MFR BLD: 7.1 % (ref 4.8–5.6)
HDLC SERPL-MCNC: 22 MG/DL (ref 40–60)
POTASSIUM BLD-SCNC: 4.4 MMOL/L (ref 3.5–5.5)
PROT SERPL-MCNC: 6.6 G/DL (ref 5.7–8.2)
SODIUM BLD-SCNC: 141 MMOL/L (ref 132–146)
TRIGL SERPL-MCNC: 254 MG/DL (ref 0–150)

## 2017-11-30 PROCEDURE — 99214 OFFICE O/P EST MOD 30 MIN: CPT | Performed by: INTERNAL MEDICINE

## 2017-11-30 PROCEDURE — 80061 LIPID PANEL: CPT | Performed by: INTERNAL MEDICINE

## 2017-11-30 PROCEDURE — 83036 HEMOGLOBIN GLYCOSYLATED A1C: CPT | Performed by: INTERNAL MEDICINE

## 2017-11-30 PROCEDURE — 36415 COLL VENOUS BLD VENIPUNCTURE: CPT | Performed by: INTERNAL MEDICINE

## 2017-11-30 PROCEDURE — 80053 COMPREHEN METABOLIC PANEL: CPT | Performed by: INTERNAL MEDICINE

## 2017-12-18 ENCOUNTER — HOSPITAL ENCOUNTER (EMERGENCY)
Facility: HOSPITAL | Age: 82
Discharge: HOME | End: 2017-12-18
Attending: EMERGENCY MEDICINE | Admitting: EMERGENCY MEDICINE

## 2017-12-18 VITALS
BODY MASS INDEX: 34.32 KG/M2 | HEART RATE: 86 BPM | WEIGHT: 276 LBS | DIASTOLIC BLOOD PRESSURE: 71 MMHG | TEMPERATURE: 98.2 F | OXYGEN SATURATION: 95 % | RESPIRATION RATE: 18 BRPM | HEIGHT: 75 IN | SYSTOLIC BLOOD PRESSURE: 125 MMHG

## 2017-12-18 DIAGNOSIS — R04.0 EPISTAXIS: Primary | ICD-10-CM

## 2017-12-18 DIAGNOSIS — R03.0 ELEVATED BLOOD PRESSURE READING: ICD-10-CM

## 2017-12-18 DIAGNOSIS — Z79.01 ANTICOAGULANT LONG-TERM USE: ICD-10-CM

## 2017-12-18 PROCEDURE — 99282 EMERGENCY DEPT VISIT SF MDM: CPT

## 2017-12-18 NOTE — ED PROVIDER NOTES
Subjective   HPI Comments: Mr. Camarena presents with intermittent left nares bleeding.  This is been intermittent over the last few days.  He reports getting this each year when the weather cools and his furnace comes on.  He uses CPAP and this further irritates the area.  He takes Eliquis.  He has tried normal saline sprays to is  but this is been ineffective.  He is able to stop the bleeding but it recurs each day/night.  He does not feel he has lost an excessive amount of blood.  He denies lightheadedness/dizziness.  He denies trauma to his nose other than that provided by the CPAP.    Patient is a 82 y.o. male presenting with nosebleeds.   History provided by:  Patient  Nose Bleed   Location:  L nare  Severity:  Mild  Duration:  3 days  Timing:  Intermittent  Progression:  Unchanged  Chronicity:  Recurrent  Context: anticoagulants and CPAP    Relieved by:  Nothing  Ineffective treatments: Normal saline's writing.  Associated symptoms: no congestion, no fever, no headaches and no sore throat        Review of Systems   Constitutional: Negative for chills, fatigue and fever.   HENT: Positive for nosebleeds. Negative for congestion and sore throat.    Respiratory: Negative for shortness of breath.    Cardiovascular: Negative for chest pain.   Gastrointestinal: Negative for abdominal pain.   Genitourinary: Negative for dysuria.   Musculoskeletal: Negative for back pain.   Skin: Negative for rash.   Neurological: Negative for headaches.   Psychiatric/Behavioral: Negative for agitation and confusion.   All other systems reviewed and are negative.      Past Medical History:   Diagnosis Date   • Atrial fibrillation    • Chronic kidney disease    • Diabetes mellitus    • Gout    • History of colonoscopy 09/12/2012   • Hyperlipidemia    • Hypertension    • PAF (paroxysmal atrial fibrillation)    • Prostatism    • Sleep apnea        Allergies   Allergen Reactions   • Penicillins Hives   • Xarelto [Rivaroxaban]      GI  bleed       Past Surgical History:   Procedure Laterality Date   • CATARACT EXTRACTION Left    • KIDNEY STONE SURGERY         Family History   Problem Relation Age of Onset   • Alzheimer's disease Mother    • Cancer Father    • COPD Father        Social History     Social History   • Marital status:      Spouse name: N/A   • Number of children: N/A   • Years of education: N/A     Social History Main Topics   • Smoking status: Former Smoker     Quit date: 5/16/1960   • Smokeless tobacco: Never Used      Comment: started at 13 yo.   • Alcohol use No   • Drug use: No   • Sexual activity: Defer     Other Topics Concern   • None     Social History Narrative           Objective   Physical Exam   Constitutional: He appears well-developed and well-nourished.   HENT:   Mouth/Throat: Oropharynx is clear and moist.   Cardiovascular: Normal rate and normal pulses.    Pulmonary/Chest: Effort normal and breath sounds normal.   Abdominal: Soft. There is no tenderness.   Neurological: He is alert. He is not disoriented.   Skin: Skin is warm and dry. No rash noted. No pallor.   Psychiatric: He has a normal mood and affect.   Nursing note and vitals reviewed.      Epistaxis Management  Date/Time: 12/18/2017 7:01 AM  Performed by: RUSTAM ESTES  Authorized by: RUSTAM ESTES     Consent:     Consent obtained:  Verbal    Consent given by:  Patient    Risks discussed:  Bleeding and nasal injury  Procedure details:     Treatment site:  L anterior    Treatment method:  Silver nitrate    Treatment complexity:  Limited    Treatment episode: initial    Post-procedure details:     Assessment:  Bleeding stopped    Patient tolerance of procedure:  Tolerated well, no immediate complications             ED Course  ED Course   Comment By Time   Patient is asymptomatic at this point.  He feels that he can breathe well through both sides of his nose.  I spoke with him about follow-up if his bleeding recurs. Rustam Estes MD 12/18 6647       No results found for this or any previous visit (from the past 24 hour(s)).  Note: In addition to lab results from this visit, the labs listed above may include labs taken at another facility or during a different encounter within the last 24 hours. Please correlate lab times with ED admission and discharge times for further clarification of the services performed during this visit.    No orders to display     Vitals:    12/18/17 0618 12/18/17 0624 12/18/17 0700 12/18/17 0731   BP: 146/90  120/67 125/71   BP Location:       Patient Position:       Pulse:       Resp:       Temp:  98.2 °F (36.8 °C)     TempSrc:  Oral     SpO2:       Weight:       Height:         Medications - No data to display  ECG/EMG Results (last 24 hours)     ** No results found for the last 24 hours. **                      MDM  Number of Diagnoses or Management Options      Final diagnoses:   Epistaxis   Anticoagulant long-term use   Elevated blood pressure reading            Kushal Rojo MD  12/18/17 0713

## 2017-12-18 NOTE — DISCHARGE INSTRUCTIONS
Utilize in room humidifier as well as in-line CPAP humidifier.    If significant bleeding recurs please follow-up with our on-call ENT physician, Dr. Bueno.    Do your best to avoid nasal trauma.    Please review the medications you are supposed to be taking according to prior physician recommendations. I have not changed your home medications during this visit. If your discharge instructions indicate that I have changed your home medications, this is not the case, and you should disregard. If you have any questions about the medication you should be taking at home, please call your physician.

## 2018-01-08 RX ORDER — OMEPRAZOLE 20 MG/1
CAPSULE, DELAYED RELEASE ORAL
Qty: 90 CAPSULE | Refills: 1 | Status: SHIPPED | OUTPATIENT
Start: 2018-01-08 | End: 2018-07-07 | Stop reason: SDUPTHER

## 2018-02-07 RX ORDER — METHENAMINE HIPPURATE 1000 MG/1
500 TABLET ORAL 2 TIMES DAILY WITH MEALS
Qty: 90 TABLET | Refills: 1 | Status: SHIPPED | OUTPATIENT
Start: 2018-02-07 | End: 2018-08-04 | Stop reason: SDUPTHER

## 2018-04-09 ENCOUNTER — OFFICE VISIT (OUTPATIENT)
Dept: INTERNAL MEDICINE | Facility: CLINIC | Age: 83
End: 2018-04-09

## 2018-04-09 VITALS
HEART RATE: 64 BPM | RESPIRATION RATE: 18 BRPM | WEIGHT: 260 LBS | DIASTOLIC BLOOD PRESSURE: 64 MMHG | TEMPERATURE: 97.5 F | SYSTOLIC BLOOD PRESSURE: 118 MMHG | BODY MASS INDEX: 32.5 KG/M2

## 2018-04-09 DIAGNOSIS — R31.9 HEMATURIA, UNSPECIFIED TYPE: ICD-10-CM

## 2018-04-09 DIAGNOSIS — R82.998 LEUKOCYTES IN URINE: ICD-10-CM

## 2018-04-09 DIAGNOSIS — I10 ESSENTIAL HYPERTENSION: ICD-10-CM

## 2018-04-09 DIAGNOSIS — K21.00 GASTROESOPHAGEAL REFLUX DISEASE WITH ESOPHAGITIS: ICD-10-CM

## 2018-04-09 DIAGNOSIS — E78.5 HYPERLIPIDEMIA, UNSPECIFIED HYPERLIPIDEMIA TYPE: ICD-10-CM

## 2018-04-09 DIAGNOSIS — E11.9 TYPE 2 DIABETES MELLITUS WITHOUT COMPLICATION, WITHOUT LONG-TERM CURRENT USE OF INSULIN (HCC): Primary | ICD-10-CM

## 2018-04-09 LAB
A/C: NORMAL
ALBUMIN SERPL-MCNC: 4.1 G/DL (ref 3.2–4.8)
ALBUMIN/GLOB SERPL: 1.5 G/DL (ref 1.5–2.5)
ALP SERPL-CCNC: 73 U/L (ref 25–100)
ALT SERPL W P-5'-P-CCNC: 29 U/L (ref 7–40)
ANION GAP SERPL CALCULATED.3IONS-SCNC: 8 MMOL/L (ref 3–11)
ARTICHOKE IGE QN: 59 MG/DL (ref 0–130)
AST SERPL-CCNC: 23 U/L (ref 0–33)
BACTERIA UR QL AUTO: ABNORMAL /HPF
BILIRUB BLD-MCNC: NEGATIVE MG/DL
BILIRUB SERPL-MCNC: 0.9 MG/DL (ref 0.3–1.2)
BUN BLD-MCNC: 22 MG/DL (ref 9–23)
BUN/CREAT SERPL: 31.4 (ref 7–25)
CALCIUM SPEC-SCNC: 9.4 MG/DL (ref 8.7–10.4)
CHLORIDE SERPL-SCNC: 102 MMOL/L (ref 99–109)
CHOLEST SERPL-MCNC: 104 MG/DL (ref 0–200)
CLARITY, POC: CLEAR
CO2 SERPL-SCNC: 30 MMOL/L (ref 20–31)
COLOR UR: YELLOW
CREAT BLD-MCNC: 0.7 MG/DL (ref 0.6–1.3)
EXPIRATION DATE: ABNORMAL
EXPIRATION DATE: NORMAL
GFR SERPL CREATININE-BSD FRML MDRD: 108 ML/MIN/1.73
GLOBULIN UR ELPH-MCNC: 2.7 GM/DL
GLUCOSE BLD-MCNC: 128 MG/DL (ref 70–100)
GLUCOSE UR STRIP-MCNC: NEGATIVE MG/DL
HBA1C MFR BLD: 6.8 % (ref 4.8–5.6)
HDLC SERPL-MCNC: 26 MG/DL (ref 40–60)
HYALINE CASTS UR QL AUTO: ABNORMAL /LPF
KETONES UR QL: NEGATIVE
LEUKOCYTE EST, POC: ABNORMAL
Lab: ABNORMAL
Lab: NORMAL
NITRITE UR-MCNC: NEGATIVE MG/ML
PH UR: 5 [PH] (ref 5–8)
POC CREATININE URINE: 200
POC MICROALBUMIN URINE: 80
POTASSIUM BLD-SCNC: 4.1 MMOL/L (ref 3.5–5.5)
PROT SERPL-MCNC: 6.8 G/DL (ref 5.7–8.2)
PROT UR STRIP-MCNC: NEGATIVE MG/DL
RBC # UR STRIP: ABNORMAL /UL
RBC # UR: ABNORMAL /HPF
REF LAB TEST METHOD: ABNORMAL
SODIUM BLD-SCNC: 140 MMOL/L (ref 132–146)
SP GR UR: 1.01 (ref 1–1.03)
SQUAMOUS #/AREA URNS HPF: ABNORMAL /HPF
TRIGL SERPL-MCNC: 155 MG/DL (ref 0–150)
UROBILINOGEN UR QL: NORMAL
WBC UR QL AUTO: ABNORMAL /HPF

## 2018-04-09 PROCEDURE — 81003 URINALYSIS AUTO W/O SCOPE: CPT | Performed by: INTERNAL MEDICINE

## 2018-04-09 PROCEDURE — 80061 LIPID PANEL: CPT | Performed by: INTERNAL MEDICINE

## 2018-04-09 PROCEDURE — 81015 MICROSCOPIC EXAM OF URINE: CPT | Performed by: INTERNAL MEDICINE

## 2018-04-09 PROCEDURE — 87086 URINE CULTURE/COLONY COUNT: CPT | Performed by: INTERNAL MEDICINE

## 2018-04-09 PROCEDURE — 83036 HEMOGLOBIN GLYCOSYLATED A1C: CPT | Performed by: INTERNAL MEDICINE

## 2018-04-09 PROCEDURE — 80053 COMPREHEN METABOLIC PANEL: CPT | Performed by: INTERNAL MEDICINE

## 2018-04-09 PROCEDURE — 36415 COLL VENOUS BLD VENIPUNCTURE: CPT | Performed by: INTERNAL MEDICINE

## 2018-04-09 PROCEDURE — 99214 OFFICE O/P EST MOD 30 MIN: CPT | Performed by: INTERNAL MEDICINE

## 2018-04-09 PROCEDURE — 82044 UR ALBUMIN SEMIQUANTITATIVE: CPT | Performed by: INTERNAL MEDICINE

## 2018-04-09 NOTE — PROGRESS NOTES
Chief Complaint   Patient presents with   • Follow-up     4 MONTH FOLLOW UP CHRONIC MEDICAL PROBLEMS       History of Present Illness      The patient presents for a follow-up related to Type 2 Diabetes Mellitus and reports that he doesn't check his blood sugars at home. He denies hypoglycemic symptoms. The patient denies polyuria, polydypsia or polyphagia. He reports no symptoms of neuropathy. The patient takes his medication as prescribed. He is not taking insulin. The patient does check his feet daily at home. He denies chest pain, shortness of breath, orthopnea, paroxysmal nocturnal dyspnea, dyspnea on exertion, edema, palpitations or syncope.    The patient presents for a follow-up related to hyperlipidemia. He is following a low fat diet. He reports that he is exercising. He is taking pravastatin. The patient is taking his medication as prescribed. He reports no medication side effects, including muscle cramps, abdominal pain, headaches or weakness.    The patient presents for a follow-up related to hypertension. The patient reports that he has had no headaches or blurred vision. He states that he is taking his medication as prescribed. He is not having medication side effects. He does not check his blood pressures at home.    The patient is on omeprazole for his gastroesophageal reflux. The medication is taken on a regular basis and gives complete relief of the symptoms. He reports no abdominal pain, belching, diarrhea, dysphagia, early satiety, heartburn, hoarseness, nausea, odynophagia, rectal bleeding, vomiting or weight loss. The GERD has no known aggravating factors.    Review of Systems    CARDIOVASCULAR- Denies Claudication.    PULMONARY- Denies Wheezing, Sputum Production, Cough, Hemoptysis or Pleuritic Chest Pain.    GASTROINTESTINAL- Denies Constipation.    Medications      Current Outpatient Prescriptions:   •  allopurinol (ZYLOPRIM) 300 MG tablet, Take 1 tablet by mouth Daily., Disp: 90 tablet,  Rfl: 1  •  amLODIPine (NORVASC) 10 MG tablet, Take 1 tablet by mouth Daily for 360 days., Disp: 90 tablet, Rfl: 3  •  apixaban (ELIQUIS) 5 MG tablet tablet, Take 1 tablet by mouth Every 12 (Twelve) Hours for 360 days., Disp: 180 tablet, Rfl: 3  •  Ascorbic Acid (VITAMIN C) 500 MG capsule, Take 1 tablet by mouth 4 (four) times a day., Disp: , Rfl:   •  docusate sodium (COLACE) 100 MG capsule, Take 300 mg by mouth every night., Disp: , Rfl:   •  Ferrous Gluconate (IRON) 240 (27 FE) MG tablet, Take 1 tablet by mouth daily., Disp: , Rfl:   •  finasteride (PROSCAR) 5 MG tablet, Take 5 mg by mouth every night., Disp: , Rfl:   •  hydrochlorothiazide (MICROZIDE) 12.5 MG capsule, Take 1 capsule by mouth Every Morning., Disp: 90 capsule, Rfl: 3  •  lisinopril (PRINIVIL,ZESTRIL) 40 MG tablet, Take 1 tablet by mouth Daily for 360 days., Disp: 90 tablet, Rfl: 3  •  metFORMIN (GLUCOPHAGE) 1000 MG tablet, TAKE ONE TABLET BY MOUTH TWICE DAILY, Disp: 180 tablet, Rfl: 1  •  methenamine (HIPREX) 1 g tablet, Take 0.5 tablets by mouth 2 (Two) Times a Day With Meals., Disp: 90 tablet, Rfl: 1  •  omeprazole (priLOSEC) 20 MG capsule, TAKE ONE CAPSULE BY MOUTH ONCE DAILY, Disp: 90 capsule, Rfl: 1  •  pravastatin (PRAVACHOL) 40 MG tablet, TAKE ONE TABLET BY MOUTH ONCE DAILY, Disp: 90 tablet, Rfl: 1  •  Probiotic Product (PROBIOTIC ADVANCED PO), Take 1 tablet by mouth 2 (Two) Times a Day., Disp: , Rfl:   •  sotalol (BETAPACE AF) 80 MG tablet tablet, TAKE ONE TABLET BY MOUTH EVERY 12 HOURS, Disp: 180 tablet, Rfl: 3  •  tamsulosin (FLOMAX) 0.4 MG capsule 24 hr capsule, Take 1 capsule by mouth daily., Disp: , Rfl:      Allergies    Allergies   Allergen Reactions   • Penicillins Hives   • Xarelto [Rivaroxaban]      GI bleed       Problem List    Patient Active Problem List   Diagnosis   • Atopic rhinitis   • Benign (familial) paraproteinemia   • Type 2 diabetes mellitus   • Enlarged prostate without lower urinary tract symptoms (luts)   •  Gastroesophageal reflux disease with esophagitis   • Polyp of gallbladder   • Gout   • Hematuria   • Liver cyst   • Hyperlipidemia LDL goal <100   • Essential hypertension   • Nephrolithiasis   • Obstructive sleep apnea syndrome   • Paroxysmal atrial fibrillation   • Ingrowing toenail   • Chronic kidney disease       Medications, Allergies, Problems List and Past History were reviewed and updated.    Physical Examination    /64 (BP Location: Right arm, Patient Position: Sitting, Cuff Size: Adult)   Pulse 64   Temp 97.5 °F (36.4 °C) (Temporal Artery )   Resp 18   Wt 118 kg (260 lb)   BMI 32.50 kg/m²     HEENT: Head- Normocephalic Atraumatic. Facies- Within normal limits. Pinnas- Normal texture and shape bilaterally. Canals- Normal bilaterally. TMs- Normal bilaterally. Nares- Patent bilaterally. Nasal Septum- is normal. There is no tenderness to palpation over the frontal or maxillary sinuses. Lids- Normal bilaterally. Conjunctiva- Clear bilaterally. Sclera- Anicteric bilaterally. Oropharynx- Moist with no lesions. Tonsils- No enlargement, erythema or exudate.    Neck: Thyroid- non enlarged, symmetric and has no nodules. No bruits are detected. ROM- Normal Range of Motion with no rigidity.    Lungs: Auscultation- Clear to auscultation bilaterally. There are no retractions, clubbing or cyanosis. The Expiratory to Inspiratory ratio is equal.    Cardiovascular: There are no carotid bruits. Heart- Normal Rate with Regular rhythm and no murmurs. There are no gallops. There are no rubs. In the lower extremities there is no edema. The upper extremities do not have edema.    Abdomen: Soft, benign, non-tender with no masses, hernias, organomegaly or scars.    Feet: The feet are symmetric with normal ericka landmarks. There is no tenderness to palpation bilaterally. The feet have normal posterior tibial and dorsalis pedis pulses and normal capillary refill bilaterally. The monofilament examination is normal  bilaterally.       The arches are normal bilaterally. There are no skin or nail lesions present. There are no ingrown nails. There are no bunions noted.    Impression and Assessment    Type 2 Diabetes Mellitus.    Hyperlipidemia.    Essential Hypertension.    Gastroesophageal Reflux Disease.    Plan    Gastroesophageal Reflux Disease Plan: The current plan was continued.    Hyperlipidemia Plan: He was instructed to eat a low fat diet. He was encouraged to exercise daily. The patient was instructed to continue the current medications.    Essential Hypertension Plan: The current plan was continued.    Type 2 Diabetes Mellitus Plan: Further plans will be formulated after test results are reviewed.    Darien was seen today for follow-up.    Diagnoses and all orders for this visit:    Type 2 diabetes mellitus without complication, without long-term current use of insulin  -     Comprehensive Metabolic Panel  -     Hemoglobin A1c  -     POC Microalbumin    Gastroesophageal reflux disease with esophagitis  -     Comprehensive Metabolic Panel    Essential hypertension  -     Comprehensive Metabolic Panel  -     POC Urinalysis Dipstick, Automated    Hyperlipidemia, unspecified hyperlipidemia type  -     Comprehensive Metabolic Panel  -     Lipid Panel        Return to Office    The patient was instructed to return for follow-up in 4 months. Next Visit: Physical.    The patient was instructed to return sooner if the condition changes, worsens, or doesn't resolve.

## 2018-04-11 LAB
BACTERIA SPEC AEROBE CULT: NORMAL
BACTERIA SPEC AEROBE CULT: NORMAL

## 2018-04-16 RX ORDER — ALLOPURINOL 300 MG/1
TABLET ORAL
Qty: 90 TABLET | Refills: 1 | Status: SHIPPED | OUTPATIENT
Start: 2018-04-16 | End: 2018-10-13 | Stop reason: SDUPTHER

## 2018-04-17 ENCOUNTER — TELEPHONE (OUTPATIENT)
Dept: CARDIOLOGY | Facility: CLINIC | Age: 83
End: 2018-04-17

## 2018-04-17 NOTE — TELEPHONE ENCOUNTER
ELIQUIS TIER EXCEPTION APPROVED UNTIL 12/31/2018. 90 DAY SUPPLY FOR $0. PATIENT AND PHARMACY NOTIFIED.

## 2018-06-14 ENCOUNTER — TELEPHONE (OUTPATIENT)
Dept: INTERNAL MEDICINE | Facility: CLINIC | Age: 83
End: 2018-06-14

## 2018-06-14 NOTE — TELEPHONE ENCOUNTER
Paper request rc for tamsulosin, last Rx was written by Dr. Sharp, Pharmacy wrote note and stated pt told them he sees you for this medication now. Ok to provide refills? Thank you.

## 2018-06-17 NOTE — TELEPHONE ENCOUNTER
Please call in 6 month supply (Either 1 month with RF 5 or 3 months with RF 1).  Pito Way MD  2:19 PM  06/17/18

## 2018-06-18 RX ORDER — TAMSULOSIN HYDROCHLORIDE 0.4 MG/1
1 CAPSULE ORAL DAILY
Qty: 90 CAPSULE | Refills: 1 | Status: SHIPPED | OUTPATIENT
Start: 2018-06-18 | End: 2018-11-24 | Stop reason: SDUPTHER

## 2018-07-09 RX ORDER — OMEPRAZOLE 20 MG/1
CAPSULE, DELAYED RELEASE ORAL
Qty: 90 CAPSULE | Refills: 1 | Status: SHIPPED | OUTPATIENT
Start: 2018-07-09 | End: 2019-01-05 | Stop reason: SDUPTHER

## 2018-07-31 RX ORDER — FINASTERIDE 5 MG/1
5 TABLET, FILM COATED ORAL NIGHTLY
Qty: 90 TABLET | Refills: 1 | Status: SHIPPED | OUTPATIENT
Start: 2018-07-31 | End: 2019-01-20 | Stop reason: SDUPTHER

## 2018-08-06 RX ORDER — METHENAMINE HIPPURATE 1000 MG/1
TABLET ORAL
Qty: 90 TABLET | Refills: 1 | Status: SHIPPED | OUTPATIENT
Start: 2018-08-06 | End: 2019-02-02 | Stop reason: SDUPTHER

## 2018-08-15 ENCOUNTER — OFFICE VISIT (OUTPATIENT)
Dept: INTERNAL MEDICINE | Facility: CLINIC | Age: 83
End: 2018-08-15

## 2018-08-15 VITALS
RESPIRATION RATE: 20 BRPM | HEART RATE: 72 BPM | BODY MASS INDEX: 32.78 KG/M2 | SYSTOLIC BLOOD PRESSURE: 130 MMHG | TEMPERATURE: 97.6 F | DIASTOLIC BLOOD PRESSURE: 64 MMHG | WEIGHT: 262.25 LBS

## 2018-08-15 DIAGNOSIS — I10 ESSENTIAL HYPERTENSION: ICD-10-CM

## 2018-08-15 DIAGNOSIS — E78.5 HYPERLIPIDEMIA, UNSPECIFIED HYPERLIPIDEMIA TYPE: ICD-10-CM

## 2018-08-15 DIAGNOSIS — E11.9 TYPE 2 DIABETES MELLITUS WITHOUT COMPLICATION, WITHOUT LONG-TERM CURRENT USE OF INSULIN (HCC): Primary | ICD-10-CM

## 2018-08-15 DIAGNOSIS — K21.00 GASTROESOPHAGEAL REFLUX DISEASE WITH ESOPHAGITIS: ICD-10-CM

## 2018-08-15 LAB
ALBUMIN SERPL-MCNC: 4.05 G/DL (ref 3.2–4.8)
ALBUMIN/GLOB SERPL: 1.5 G/DL (ref 1.5–2.5)
ALP SERPL-CCNC: 78 U/L (ref 25–100)
ALT SERPL W P-5'-P-CCNC: 24 U/L (ref 7–40)
ANION GAP SERPL CALCULATED.3IONS-SCNC: 7 MMOL/L (ref 3–11)
ARTICHOKE IGE QN: 59 MG/DL (ref 0–130)
AST SERPL-CCNC: 19 U/L (ref 0–33)
BILIRUB SERPL-MCNC: 0.8 MG/DL (ref 0.3–1.2)
BUN BLD-MCNC: 22 MG/DL (ref 9–23)
BUN/CREAT SERPL: 27.8 (ref 7–25)
CALCIUM SPEC-SCNC: 9.2 MG/DL (ref 8.7–10.4)
CHLORIDE SERPL-SCNC: 105 MMOL/L (ref 99–109)
CHOLEST SERPL-MCNC: 98 MG/DL (ref 0–200)
CO2 SERPL-SCNC: 27 MMOL/L (ref 20–31)
CREAT BLD-MCNC: 0.79 MG/DL (ref 0.6–1.3)
GFR SERPL CREATININE-BSD FRML MDRD: 94 ML/MIN/1.73
GLOBULIN UR ELPH-MCNC: 2.8 GM/DL
GLUCOSE BLD-MCNC: 152 MG/DL (ref 70–100)
HBA1C MFR BLD: 6.8 % (ref 4.8–5.6)
HDLC SERPL-MCNC: 28 MG/DL (ref 40–60)
POTASSIUM BLD-SCNC: 4 MMOL/L (ref 3.5–5.5)
PROT SERPL-MCNC: 6.8 G/DL (ref 5.7–8.2)
SODIUM BLD-SCNC: 139 MMOL/L (ref 132–146)
TRIGL SERPL-MCNC: 159 MG/DL (ref 0–150)

## 2018-08-15 PROCEDURE — 36415 COLL VENOUS BLD VENIPUNCTURE: CPT | Performed by: INTERNAL MEDICINE

## 2018-08-15 PROCEDURE — 99214 OFFICE O/P EST MOD 30 MIN: CPT | Performed by: INTERNAL MEDICINE

## 2018-08-15 PROCEDURE — 80053 COMPREHEN METABOLIC PANEL: CPT | Performed by: INTERNAL MEDICINE

## 2018-08-15 PROCEDURE — 83036 HEMOGLOBIN GLYCOSYLATED A1C: CPT | Performed by: INTERNAL MEDICINE

## 2018-08-15 PROCEDURE — 80061 LIPID PANEL: CPT | Performed by: INTERNAL MEDICINE

## 2018-08-15 NOTE — PATIENT INSTRUCTIONS
Fall Prevention in the Home  Falls can cause injuries. They can happen to people of all ages. There are many things you can do to make your home safe and to help prevent falls.  What can I do on the outside of my home?  · Regularly fix the edges of walkways and driveways and fix any cracks.  · Remove anything that might make you trip as you walk through a door, such as a raised step or threshold.  · Trim any bushes or trees on the path to your home.  · Use bright outdoor lighting.  · Clear any walking paths of anything that might make someone trip, such as rocks or tools.  · Regularly check to see if handrails are loose or broken. Make sure that both sides of any steps have handrails.  · Any raised decks and porches should have guardrails on the edges.  · Have any leaves, snow, or ice cleared regularly.  · Use sand or salt on walking paths during winter.  · Clean up any spills in your garage right away. This includes oil or grease spills.  What can I do in the bathroom?  · Use night lights.  · Install grab bars by the toilet and in the tub and shower. Do not use towel bars as grab bars.  · Use non-skid mats or decals in the tub or shower.  · If you need to sit down in the shower, use a plastic, non-slip stool.  · Keep the floor dry. Clean up any water that spills on the floor as soon as it happens.  · Remove soap buildup in the tub or shower regularly.  · Attach bath mats securely with double-sided non-slip rug tape.  · Do not have throw rugs and other things on the floor that can make you trip.  What can I do in the bedroom?  · Use night lights.  · Make sure that you have a light by your bed that is easy to reach.  · Do not use any sheets or blankets that are too big for your bed. They should not hang down onto the floor.  · Have a firm chair that has side arms. You can use this for support while you get dressed.  · Do not have throw rugs and other things on the floor that can make you trip.  What can I do in the  kitchen?  · Clean up any spills right away.  · Avoid walking on wet floors.  · Keep items that you use a lot in easy-to-reach places.  · If you need to reach something above you, use a strong step stool that has a grab bar.  · Keep electrical cords out of the way.  · Do not use floor polish or wax that makes floors slippery. If you must use wax, use non-skid floor wax.  · Do not have throw rugs and other things on the floor that can make you trip.  What can I do with my stairs?  · Do not leave any items on the stairs.  · Make sure that there are handrails on both sides of the stairs and use them. Fix handrails that are broken or loose. Make sure that handrails are as long as the stairways.  · Check any carpeting to make sure that it is firmly attached to the stairs. Fix any carpet that is loose or worn.  · Avoid having throw rugs at the top or bottom of the stairs. If you do have throw rugs, attach them to the floor with carpet tape.  · Make sure that you have a light switch at the top of the stairs and the bottom of the stairs. If you do not have them, ask someone to add them for you.  What else can I do to help prevent falls?  · Wear shoes that:  ? Do not have high heels.  ? Have rubber bottoms.  ? Are comfortable and fit you well.  ? Are closed at the toe. Do not wear sandals.  · If you use a stepladder:  ? Make sure that it is fully opened. Do not climb a closed stepladder.  ? Make sure that both sides of the stepladder are locked into place.  ? Ask someone to hold it for you, if possible.  · Clearly kristal and make sure that you can see:  ? Any grab bars or handrails.  ? First and last steps.  ? Where the edge of each step is.  · Use tools that help you move around (mobility aids) if they are needed. These include:  ? Canes.  ? Walkers.  ? Scooters.  ? Crutches.  · Turn on the lights when you go into a dark area. Replace any light bulbs as soon as they burn out.  · Set up your furniture so you have a clear path.  Avoid moving your furniture around.  · If any of your floors are uneven, fix them.  · If there are any pets around you, be aware of where they are.  · Review your medicines with your doctor. Some medicines can make you feel dizzy. This can increase your chance of falling.  Ask your doctor what other things that you can do to help prevent falls.  This information is not intended to replace advice given to you by your health care provider. Make sure you discuss any questions you have with your health care provider.  Document Released: 10/14/2010 Document Revised: 05/25/2017 Document Reviewed: 01/22/2016  [employer]vier Interactive Patient Education © 2018 [employer]

## 2018-08-15 NOTE — PROGRESS NOTES
Chief Complaint   Patient presents with   • Diabetes     4 month follow up   non fasting        History of Present Illness      The patient presents for a follow-up related to Type 2 Diabetes Mellitus and reports that he doesn't check his blood sugars at home. He denies hypoglycemic symptoms. The patient denies polyuria, polydypsia or polyphagia. He reports no symptoms of neuropathy. The patient takes his medication as prescribed. He is not taking insulin. The patient does check his feet daily at home. He denies chest pain, shortness of breath, orthopnea, paroxysmal nocturnal dyspnea, dyspnea on exertion, edema, palpitations or syncope.    The patient presents for a follow-up related to hyperlipidemia. He is following a low fat diet. He reports that he is exercising. He is taking pravastatin. The patient is taking his medication as prescribed. He reports no medication side effects, including muscle cramps, abdominal pain, headaches or weakness.    The patient presents for a follow-up related to hypertension. The patient reports that he has had no headaches or blurred vision. He states that he is taking his medication as prescribed. He is not having medication side effects.    The patient presents for a follow-up related to GERD. The patient is on omeprazole for his gastroesophageal reflux. The medication is taken on a regular basis and gives complete relief of the symptoms. He reports no abdominal pain, belching, diarrhea, dysphagia, early satiety, heartburn, hoarseness, nausea, odynophagia, rectal bleeding, vomiting or weight loss. The GERD has no known aggravating factors.    Review of Systems    GENERAL/CONSTITUTIONAL- Denies Fever, Chills, Sweats, Fatigue, Weakness or Malaise.    CARDIOVASCULAR- Denies Claudication.    PULMONARY- Denies Wheezing, Sputum Production, Cough, Hemoptysis or Pleuritic Chest Pain.    GASTROINTESTINAL- Denies Constipation.    Medications      Current Outpatient Prescriptions:   •   allopurinol (ZYLOPRIM) 300 MG tablet, TAKE ONE TABLET BY MOUTH ONCE DAILY, Disp: 90 tablet, Rfl: 1  •  amLODIPine (NORVASC) 10 MG tablet, Take 1 tablet by mouth Daily for 360 days., Disp: 90 tablet, Rfl: 3  •  apixaban (ELIQUIS) 5 MG tablet tablet, Take 1 tablet by mouth Every 12 (Twelve) Hours for 360 days., Disp: 180 tablet, Rfl: 3  •  Ascorbic Acid (VITAMIN C) 500 MG capsule, Take 1 tablet by mouth 4 (four) times a day., Disp: , Rfl:   •  docusate sodium (COLACE) 100 MG capsule, Take 300 mg by mouth every night., Disp: , Rfl:   •  Ferrous Gluconate (IRON) 240 (27 FE) MG tablet, Take 1 tablet by mouth daily., Disp: , Rfl:   •  finasteride (PROSCAR) 5 MG tablet, Take 1 tablet by mouth Every Night., Disp: 90 tablet, Rfl: 1  •  hydrochlorothiazide (MICROZIDE) 12.5 MG capsule, Take 1 capsule by mouth Every Morning., Disp: 90 capsule, Rfl: 3  •  lisinopril (PRINIVIL,ZESTRIL) 40 MG tablet, Take 1 tablet by mouth Daily for 360 days., Disp: 90 tablet, Rfl: 3  •  metFORMIN (GLUCOPHAGE) 1000 MG tablet, TAKE 1 TABLET BY MOUTH TWICE DAILY, Disp: 180 tablet, Rfl: 1  •  methenamine (HIPREX) 1 g tablet, TAKE 1/2 (ONE-HALF) TABLET BY MOUTH TWICE DAILY WITH MEALS, Disp: 90 tablet, Rfl: 1  •  omeprazole (priLOSEC) 20 MG capsule, TAKE ONE CAPSULE BY MOUTH ONCE DAILY, Disp: 90 capsule, Rfl: 1  •  pravastatin (PRAVACHOL) 40 MG tablet, TAKE ONE TABLET BY MOUTH ONCE DAILY, Disp: 90 tablet, Rfl: 1  •  Probiotic Product (PROBIOTIC ADVANCED PO), Take 1 tablet by mouth 2 (Two) Times a Day., Disp: , Rfl:   •  sotalol (BETAPACE AF) 80 MG tablet tablet, TAKE ONE TABLET BY MOUTH EVERY 12 HOURS, Disp: 180 tablet, Rfl: 3  •  tamsulosin (FLOMAX) 0.4 MG capsule 24 hr capsule, Take 1 capsule by mouth Daily., Disp: 90 capsule, Rfl: 1     Allergies    Allergies   Allergen Reactions   • Penicillins Hives   • Xarelto [Rivaroxaban]      GI bleed       Problem List    Patient Active Problem List   Diagnosis   • Atopic rhinitis   • Benign (familial)  paraproteinemia   • Type 2 diabetes mellitus (CMS/HCC)   • Enlarged prostate without lower urinary tract symptoms (luts)   • Gastroesophageal reflux disease with esophagitis   • Polyp of gallbladder   • Gout   • Hematuria   • Liver cyst   • Hyperlipidemia LDL goal <100   • Essential hypertension   • Nephrolithiasis   • Obstructive sleep apnea syndrome   • Paroxysmal atrial fibrillation (CMS/HCC)   • Ingrowing toenail   • Chronic kidney disease       Medications, Allergies, Problems List and Past History were reviewed and updated.    Physical Examination    /64 (BP Location: Right arm)   Pulse 72   Temp 97.6 °F (36.4 °C) (Temporal Artery )   Resp 20   Wt 119 kg (262 lb 4 oz)   BMI 32.78 kg/m²     HEENT: Facies- Within normal limits. Lids- Normal bilaterally. Conjunctiva- Clear bilaterally. Sclera- Anicteric bilaterally.    Neck: Thyroid- non enlarged, symmetric and has no nodules. No bruits are detected. ROM- Normal Range of Motion with no rigidity.    Lungs: Auscultation- Clear to auscultation bilaterally. There are no retractions, clubbing or cyanosis. The Expiratory to Inspiratory ratio is equal.    Cardiovascular: There are no carotid bruits. Heart- Normal Rate with Regular rhythm and no murmurs. There are no gallops. There are no rubs. In the lower extremities there is no edema. The upper extremities do not have edema.    Abdomen: Soft, benign, non-tender with no masses, hernias, organomegaly or scars.    Impression and Assessment    Type 2 Diabetes Mellitus without complication without insulin usage.    Hyperlipidemia.    Essential Hypertension.    Gastroesophageal Reflux Disease.    Plan    Gastroesophageal Reflux Disease Plan: The current plan was continued.    Hyperlipidemia Plan: The patient was instructed to exercise daily, eat a low fat diet and continue his medications.    Essential Hypertension Plan: The current plan was continued.    Type 2 Diabetes Mellitus without complication without  insulin usage Plan: The current plan was continued.    Darien was seen today for diabetes.    Diagnoses and all orders for this visit:    Type 2 diabetes mellitus without complication, without long-term current use of insulin (CMS/[facility])  -     Comprehensive Metabolic Panel  -     Hemoglobin A1c    Gastroesophageal reflux disease with esophagitis  -     Comprehensive Metabolic Panel    Essential hypertension  -     Comprehensive Metabolic Panel    Hyperlipidemia, unspecified hyperlipidemia type  -     Comprehensive Metabolic Panel  -     Lipid Panel        Return to Office    The patient was instructed to return for follow-up in 4 months.    The patient was instructed to return sooner if the condition changes, worsens, or doesn't resolve.

## 2018-09-15 DIAGNOSIS — I10 ESSENTIAL HYPERTENSION: Chronic | ICD-10-CM

## 2018-09-17 RX ORDER — LISINOPRIL 40 MG/1
TABLET ORAL
Qty: 90 TABLET | Refills: 3 | Status: SHIPPED | OUTPATIENT
Start: 2018-09-17 | End: 2019-08-31 | Stop reason: SDUPTHER

## 2018-10-06 DIAGNOSIS — I48.0 PAROXYSMAL ATRIAL FIBRILLATION (HCC): ICD-10-CM

## 2018-10-06 DIAGNOSIS — I10 ESSENTIAL HYPERTENSION: Chronic | ICD-10-CM

## 2018-10-08 RX ORDER — HYDROCHLOROTHIAZIDE 12.5 MG/1
CAPSULE, GELATIN COATED ORAL
Qty: 90 CAPSULE | Refills: 0 | Status: SHIPPED | OUTPATIENT
Start: 2018-10-08 | End: 2018-12-24 | Stop reason: SDUPTHER

## 2018-10-08 RX ORDER — APIXABAN 5 MG/1
TABLET, FILM COATED ORAL
Qty: 180 TABLET | Refills: 0 | Status: SHIPPED | OUTPATIENT
Start: 2018-10-08 | End: 2019-01-17 | Stop reason: SDUPTHER

## 2018-10-08 RX ORDER — AMLODIPINE BESYLATE 10 MG/1
TABLET ORAL
Qty: 90 TABLET | Refills: 0 | Status: SHIPPED | OUTPATIENT
Start: 2018-10-08 | End: 2018-12-24 | Stop reason: SDUPTHER

## 2018-10-11 ENCOUNTER — TELEPHONE (OUTPATIENT)
Dept: INTERNAL MEDICINE | Facility: CLINIC | Age: 83
End: 2018-10-11

## 2018-10-11 NOTE — TELEPHONE ENCOUNTER
Attempted to contact pt's wife to schedule AWV appt, per  CROW. LVM to call if pt wld like to schedule APRN AWV on 12/17/18, after 12/17 f/u w Dr Way.

## 2018-10-15 DIAGNOSIS — I48.0 PAROXYSMAL ATRIAL FIBRILLATION (HCC): ICD-10-CM

## 2018-10-15 RX ORDER — ALLOPURINOL 300 MG/1
TABLET ORAL
Qty: 90 TABLET | Refills: 1 | Status: SHIPPED | OUTPATIENT
Start: 2018-10-15 | End: 2019-04-13 | Stop reason: SDUPTHER

## 2018-10-15 RX ORDER — SOTALOL HYDROCHLORIDE 80 MG/1
80 TABLET ORAL EVERY 12 HOURS
Qty: 180 TABLET | Refills: 3 | Status: SHIPPED | OUTPATIENT
Start: 2018-10-15 | End: 2019-03-06 | Stop reason: HOSPADM

## 2018-10-23 ENCOUNTER — OFFICE VISIT (OUTPATIENT)
Dept: CARDIOLOGY | Facility: CLINIC | Age: 83
End: 2018-10-23

## 2018-10-23 VITALS
SYSTOLIC BLOOD PRESSURE: 138 MMHG | HEIGHT: 75 IN | DIASTOLIC BLOOD PRESSURE: 78 MMHG | WEIGHT: 262.3 LBS | HEART RATE: 144 BPM | BODY MASS INDEX: 32.61 KG/M2

## 2018-10-23 DIAGNOSIS — I48.91 ATRIAL FIBRILLATION WITH RVR (HCC): Primary | ICD-10-CM

## 2018-10-23 DIAGNOSIS — I48.0 PAROXYSMAL ATRIAL FIBRILLATION (HCC): Primary | ICD-10-CM

## 2018-10-23 DIAGNOSIS — E78.5 HYPERLIPIDEMIA LDL GOAL <100: ICD-10-CM

## 2018-10-23 DIAGNOSIS — I10 ESSENTIAL HYPERTENSION: Chronic | ICD-10-CM

## 2018-10-23 PROCEDURE — 93000 ELECTROCARDIOGRAM COMPLETE: CPT | Performed by: NURSE PRACTITIONER

## 2018-10-23 PROCEDURE — 99214 OFFICE O/P EST MOD 30 MIN: CPT | Performed by: NURSE PRACTITIONER

## 2018-10-23 NOTE — ASSESSMENT & PLAN NOTE
· Hypertension is controlled  · Continue lisinopril 40 mg daily  · Continue hydrochlorothiazide 12.5 mg daily  · Continue amlodipine 10 mg daily

## 2018-10-23 NOTE — PROGRESS NOTES
Encounter Date:10/23/2018    Patient ID: Darien Camarena is a 83 y.o. male who resides in [place], [place]    CC/Reason for visit:  Essential hypertension            Darien Camarena returns today for follow-up for his paroxysmal atrial fibrillation and cardiac risk factors.  Patient reports he knows he has went out of rhythm about 3 days ago.  He is not very symptomatic with it and denies chest pain or increased dyspnea that has felt his pulse and noticed the irregularity.  EKG in our office shows atrial fibrillation with rapid ventricular response at a rate of 144 bpm.  The patient has been chronically anticoagulated with Eliquis and has not missed any doses.  He is currently on sotalol 80 mg twice a day.  The last time he went to pick his Eliquis up they wanted over $400 for a 90 day supply.  He used to be able to get Eliquis cheaper and is wondering what has happened.    Review of Systems   Constitution: Negative for weakness and malaise/fatigue.   Eyes: Negative for vision loss in left eye and vision loss in right eye.   Cardiovascular: Negative for chest pain, dyspnea on exertion, near-syncope, orthopnea, palpitations, paroxysmal nocturnal dyspnea and syncope.   Musculoskeletal: Negative for myalgias.   Neurological: Negative for brief paralysis, excessive daytime sleepiness, focal weakness, numbness and paresthesias.   All other systems reviewed and are negative.      The patient's past medical, social, family history and ROS reviewed in the patient's electronic medical record.    Allergies  Penicillins and Xarelto [rivaroxaban]    Outpatient Prescriptions Marked as Taking for the 10/23/18 encounter (Office Visit) with Blanquita Ansari APRN   Medication Sig Dispense Refill   • allopurinol (ZYLOPRIM) 300 MG tablet TAKE 1 TABLET BY MOUTH ONCE DAILY 90 tablet 1   • amLODIPine (NORVASC) 10 MG tablet TAKE ONE TABLET BY MOUTH ONCE DAILY 90 tablet 0   • Ascorbic Acid (VITAMIN C) 500 MG capsule Take 1 tablet by  "mouth 4 (four) times a day.     • docusate sodium (COLACE) 100 MG capsule Take 300 mg by mouth every night.     • ELIQUIS 5 MG tablet tablet TAKE ONE TABLET BY MOUTH EVERY 12 HOURS 180 tablet 0   • Ferrous Gluconate (IRON) 240 (27 FE) MG tablet Take 1 tablet by mouth daily.     • finasteride (PROSCAR) 5 MG tablet Take 1 tablet by mouth Every Night. 90 tablet 1   • hydrochlorothiazide (MICROZIDE) 12.5 MG capsule TAKE ONE CAPSULE BY MOUTH IN THE MORNING 90 capsule 0   • lisinopril (PRINIVIL,ZESTRIL) 40 MG tablet TAKE ONE TABLET BY MOUTH ONCE DAILY 90 tablet 3   • metFORMIN (GLUCOPHAGE) 1000 MG tablet TAKE 1 TABLET BY MOUTH TWICE DAILY 180 tablet 1   • methenamine (HIPREX) 1 g tablet TAKE 1/2 (ONE-HALF) TABLET BY MOUTH TWICE DAILY WITH MEALS 90 tablet 1   • omeprazole (priLOSEC) 20 MG capsule TAKE ONE CAPSULE BY MOUTH ONCE DAILY 90 capsule 1   • pravastatin (PRAVACHOL) 40 MG tablet TAKE ONE TABLET BY MOUTH ONCE DAILY 90 tablet 1   • Probiotic Product (PROBIOTIC ADVANCED PO) Take 1 tablet by mouth 2 (Two) Times a Day.     • sotalol (BETAPACE AF) 80 MG tablet tablet Take 1 tablet by mouth Every 12 (Twelve) Hours. 180 tablet 3   • tamsulosin (FLOMAX) 0.4 MG capsule 24 hr capsule Take 1 capsule by mouth Daily. 90 capsule 1           Blood pressure 138/78, pulse (!) 144, height 190.5 cm (75\"), weight 119 kg (262 lb 4.8 oz).  Body mass index is 32.79 kg/m².  There were no vitals filed for this visit.    Physical Exam   Constitutional: He is oriented to person, place, and time. He appears well-developed and well-nourished.   HENT:   Head: Normocephalic and atraumatic.   Eyes: Pupils are equal, round, and reactive to light. No scleral icterus.   Neck: No JVD present. Carotid bruit is not present. No thyromegaly present.   Cardiovascular: Normal rate and regular rhythm.  Exam reveals no gallop.    No murmur heard.  Pulmonary/Chest: Effort normal and breath sounds normal.   Abdominal: Soft. He exhibits no distension. There is no " hepatosplenomegaly.   Musculoskeletal: He exhibits no edema.   Neurological: He is alert and oriented to person, place, and time.   Skin: Skin is warm and dry.   Psychiatric: He has a normal mood and affect. His behavior is normal.       Data Review:       ECG 12 Lead  Date/Time: 10/23/2018 3:46 PM  Performed by: YEVGENIY MOTT  Authorized by: YEVGENIY MOTT   Comparison: compared with previous ECG from 8/22/2017  Comparison to previous ECG: Atrial fibrillation is replaced normal sinus rhythm  Rhythm: atrial fibrillation  Ectopy: trigeminy  BPM: 144  Clinical impression: abnormal ECG  Comments: Atrial fibrillation with rapid ventricular sponsor  QRS duration 84 MS  QT/QTC 3 tan/479 a minute            Lab Results   Component Value Date    CHOL 98 08/15/2018    TRIG 159 (H) 08/15/2018    HDL 28 (L) 08/15/2018    LDL 59 08/15/2018    AST 19 08/15/2018    ALT 24 08/15/2018       Lab Results   Component Value Date    HGBA1C 6.80 (H) 08/15/2018            Problem List Items Addressed This Visit        Cardiovascular and Mediastinum    Essential hypertension (Chronic)    Current Assessment & Plan     · Hypertension is controlled  · Continue lisinopril 40 mg daily  · Continue hydrochlorothiazide 12.5 mg daily  · Continue amlodipine 10 mg daily         Hyperlipidemia LDL goal <100    Overview     · Moderate to high intensity statin therapy indicated given the presence diabetes         Current Assessment & Plan     · Continue pravastatin 40 mg daily         Paroxysmal atrial fibrillation (CMS/HCC) - Primary    Overview     · Diagnosed August 2012.   · FARHAD-guided ECV (08/17/2012).  · CHADS-VASc score is 5, on Eliquis.         Current Assessment & Plan     · Continue sotalol 80 mg twice a day  · Continue Eliquis 5 mg twice a day         Relevant Orders    Adult Transthoracic Echo Complete W/ Cont if Necessary Per Protocol        Patient is in atrial fibrillation with RVR.  I've instructed him to take an extra dose of  sotalol tonight and tomorrow.  We will schedule him for an external cardioversion this Thursday.  I have spoken to our nurse who will check on re-enrollment in the reduced fee program for Eliquis.  I have given the patient samples to use for now.  Patient will also need a echocardiogram and hopefully can be performed on same day as his cardioversion       · Take extra dose of sotalol tonight and tomorrow  · External cardioversion on Thursday  · Enrolling reduced fee program for Eliquis  · Further recommendations to follow    Ami Ansari, BERYL  10/23/2018

## 2018-10-25 ENCOUNTER — PREP FOR SURGERY (OUTPATIENT)
Dept: OTHER | Facility: HOSPITAL | Age: 83
End: 2018-10-25

## 2018-10-25 ENCOUNTER — HOSPITAL ENCOUNTER (OUTPATIENT)
Dept: CARDIOLOGY | Facility: HOSPITAL | Age: 83
Discharge: HOME | End: 2018-10-25
Attending: INTERNAL MEDICINE | Admitting: NURSE PRACTITIONER

## 2018-10-25 VITALS
WEIGHT: 261.91 LBS | SYSTOLIC BLOOD PRESSURE: 119 MMHG | RESPIRATION RATE: 16 BRPM | BODY MASS INDEX: 32.56 KG/M2 | HEART RATE: 65 BPM | HEIGHT: 75 IN | DIASTOLIC BLOOD PRESSURE: 79 MMHG | TEMPERATURE: 98 F | OXYGEN SATURATION: 95 %

## 2018-10-25 DIAGNOSIS — I48.91 ATRIAL FIBRILLATION WITH RVR (HCC): ICD-10-CM

## 2018-10-25 PROBLEM — N18.30 STAGE 3 CHRONIC KIDNEY DISEASE: Status: ACTIVE | Noted: 2017-07-20

## 2018-10-25 LAB
ALBUMIN SERPL-MCNC: 4.09 G/DL (ref 3.2–4.8)
ALBUMIN/GLOB SERPL: 1.6 G/DL (ref 1.5–2.5)
ALP SERPL-CCNC: 71 U/L (ref 25–100)
ALT SERPL W P-5'-P-CCNC: 25 U/L (ref 7–40)
ANION GAP SERPL CALCULATED.3IONS-SCNC: 8 MMOL/L (ref 3–11)
AST SERPL-CCNC: 19 U/L (ref 0–33)
BILIRUB SERPL-MCNC: 1.3 MG/DL (ref 0.3–1.2)
BUN BLD-MCNC: 20 MG/DL (ref 9–23)
BUN/CREAT SERPL: 27.4 (ref 7–25)
CALCIUM SPEC-SCNC: 8.8 MG/DL (ref 8.7–10.4)
CHLORIDE SERPL-SCNC: 103 MMOL/L (ref 99–109)
CO2 SERPL-SCNC: 26 MMOL/L (ref 20–31)
CREAT BLD-MCNC: 0.73 MG/DL (ref 0.6–1.3)
DEPRECATED RDW RBC AUTO: 47.8 FL (ref 37–54)
ERYTHROCYTE [DISTWIDTH] IN BLOOD BY AUTOMATED COUNT: 13.8 % (ref 11.3–14.5)
GFR SERPL CREATININE-BSD FRML MDRD: 103 ML/MIN/1.73
GLOBULIN UR ELPH-MCNC: 2.6 GM/DL
GLUCOSE BLD-MCNC: 150 MG/DL (ref 70–100)
HCT VFR BLD AUTO: 45.9 % (ref 38.9–50.9)
HGB BLD-MCNC: 14.9 G/DL (ref 13.1–17.5)
MAGNESIUM SERPL-MCNC: 1.7 MG/DL (ref 1.3–2.7)
MCH RBC QN AUTO: 30.8 PG (ref 27–31)
MCHC RBC AUTO-ENTMCNC: 32.5 G/DL (ref 32–36)
MCV RBC AUTO: 95 FL (ref 80–99)
PLATELET # BLD AUTO: 165 10*3/MM3 (ref 150–450)
PMV BLD AUTO: 10.7 FL (ref 6–12)
POTASSIUM BLD-SCNC: 4.1 MMOL/L (ref 3.5–5.5)
PROT SERPL-MCNC: 6.7 G/DL (ref 5.7–8.2)
RBC # BLD AUTO: 4.83 10*6/MM3 (ref 4.2–5.76)
SODIUM BLD-SCNC: 137 MMOL/L (ref 132–146)
WBC NRBC COR # BLD: 7.29 10*3/MM3 (ref 3.5–10.8)

## 2018-10-25 PROCEDURE — 83735 ASSAY OF MAGNESIUM: CPT | Performed by: NURSE PRACTITIONER

## 2018-10-25 PROCEDURE — 93005 ELECTROCARDIOGRAM TRACING: CPT | Performed by: INTERNAL MEDICINE

## 2018-10-25 PROCEDURE — 36415 COLL VENOUS BLD VENIPUNCTURE: CPT

## 2018-10-25 PROCEDURE — 80053 COMPREHEN METABOLIC PANEL: CPT | Performed by: NURSE PRACTITIONER

## 2018-10-25 PROCEDURE — 92960 CARDIOVERSION ELECTRIC EXT: CPT | Performed by: INTERNAL MEDICINE

## 2018-10-25 PROCEDURE — 25010000002 MIDAZOLAM PER 1 MG: Performed by: INTERNAL MEDICINE

## 2018-10-25 PROCEDURE — 85027 COMPLETE CBC AUTOMATED: CPT | Performed by: INTERNAL MEDICINE

## 2018-10-25 PROCEDURE — 92960 CARDIOVERSION ELECTRIC EXT: CPT

## 2018-10-25 RX ORDER — NALOXONE HYDROCHLORIDE 0.4 MG/ML
INJECTION, SOLUTION INTRAMUSCULAR; INTRAVENOUS; SUBCUTANEOUS
Status: DISCONTINUED
Start: 2018-10-25 | End: 2018-10-25 | Stop reason: WASHOUT

## 2018-10-25 RX ORDER — MIDAZOLAM HYDROCHLORIDE 1 MG/ML
INJECTION INTRAMUSCULAR; INTRAVENOUS
Status: COMPLETED | OUTPATIENT
Start: 2018-10-25 | End: 2018-10-25

## 2018-10-25 RX ORDER — FENTANYL CITRATE 50 UG/ML
INJECTION, SOLUTION INTRAMUSCULAR; INTRAVENOUS
Status: DISCONTINUED
Start: 2018-10-25 | End: 2018-10-25 | Stop reason: WASHOUT

## 2018-10-25 RX ORDER — MAGNESIUM SULFATE HEPTAHYDRATE 40 MG/ML
4 INJECTION, SOLUTION INTRAVENOUS ONCE
Status: COMPLETED | OUTPATIENT
Start: 2018-10-25 | End: 2018-10-25

## 2018-10-25 RX ORDER — MIDAZOLAM HYDROCHLORIDE 1 MG/ML
INJECTION INTRAMUSCULAR; INTRAVENOUS
Status: DISCONTINUED
Start: 2018-10-25 | End: 2018-10-25 | Stop reason: HOSPADM

## 2018-10-25 RX ORDER — FLUMAZENIL 0.1 MG/ML
INJECTION INTRAVENOUS
Status: DISCONTINUED
Start: 2018-10-25 | End: 2018-10-25 | Stop reason: WASHOUT

## 2018-10-25 RX ADMIN — MIDAZOLAM HYDROCHLORIDE 2 MG: 1 INJECTION, SOLUTION INTRAMUSCULAR; INTRAVENOUS at 09:56

## 2018-10-25 RX ADMIN — MAGNESIUM SULFATE HEPTAHYDRATE 4 G: 40 INJECTION, SOLUTION INTRAVENOUS at 10:54

## 2018-10-25 RX ADMIN — METHOHEXITAL SODIUM 30 MG: 500 INJECTION, POWDER, LYOPHILIZED, FOR SOLUTION INTRAMUSCULAR; INTRAVENOUS; RECTAL at 09:57

## 2018-10-25 NOTE — H&P (VIEW-ONLY)
Encounter Date:10/23/2018    Patient ID: Darien Camarena is a 83 y.o. male who resides in [place], [place]    CC/Reason for visit:  Essential hypertension            Darien Camarena returns today for follow-up for his paroxysmal atrial fibrillation and cardiac risk factors.  Patient reports he knows he has went out of rhythm about 3 days ago.  He is not very symptomatic with it and denies chest pain or increased dyspnea that has felt his pulse and noticed the irregularity.  EKG in our office shows atrial fibrillation with rapid ventricular response at a rate of 144 bpm.  The patient has been chronically anticoagulated with Eliquis and has not missed any doses.  He is currently on sotalol 80 mg twice a day.  The last time he went to pick his Eliquis up they wanted over $400 for a 90 day supply.  He used to be able to get Eliquis cheaper and is wondering what has happened.    Review of Systems   Constitution: Negative for weakness and malaise/fatigue.   Eyes: Negative for vision loss in left eye and vision loss in right eye.   Cardiovascular: Negative for chest pain, dyspnea on exertion, near-syncope, orthopnea, palpitations, paroxysmal nocturnal dyspnea and syncope.   Musculoskeletal: Negative for myalgias.   Neurological: Negative for brief paralysis, excessive daytime sleepiness, focal weakness, numbness and paresthesias.   All other systems reviewed and are negative.      The patient's past medical, social, family history and ROS reviewed in the patient's electronic medical record.    Allergies  Penicillins and Xarelto [rivaroxaban]    Outpatient Prescriptions Marked as Taking for the 10/23/18 encounter (Office Visit) with Blanquita Ansari APRN   Medication Sig Dispense Refill   • allopurinol (ZYLOPRIM) 300 MG tablet TAKE 1 TABLET BY MOUTH ONCE DAILY 90 tablet 1   • amLODIPine (NORVASC) 10 MG tablet TAKE ONE TABLET BY MOUTH ONCE DAILY 90 tablet 0   • Ascorbic Acid (VITAMIN C) 500 MG capsule Take 1 tablet by  "mouth 4 (four) times a day.     • docusate sodium (COLACE) 100 MG capsule Take 300 mg by mouth every night.     • ELIQUIS 5 MG tablet tablet TAKE ONE TABLET BY MOUTH EVERY 12 HOURS 180 tablet 0   • Ferrous Gluconate (IRON) 240 (27 FE) MG tablet Take 1 tablet by mouth daily.     • finasteride (PROSCAR) 5 MG tablet Take 1 tablet by mouth Every Night. 90 tablet 1   • hydrochlorothiazide (MICROZIDE) 12.5 MG capsule TAKE ONE CAPSULE BY MOUTH IN THE MORNING 90 capsule 0   • lisinopril (PRINIVIL,ZESTRIL) 40 MG tablet TAKE ONE TABLET BY MOUTH ONCE DAILY 90 tablet 3   • metFORMIN (GLUCOPHAGE) 1000 MG tablet TAKE 1 TABLET BY MOUTH TWICE DAILY 180 tablet 1   • methenamine (HIPREX) 1 g tablet TAKE 1/2 (ONE-HALF) TABLET BY MOUTH TWICE DAILY WITH MEALS 90 tablet 1   • omeprazole (priLOSEC) 20 MG capsule TAKE ONE CAPSULE BY MOUTH ONCE DAILY 90 capsule 1   • pravastatin (PRAVACHOL) 40 MG tablet TAKE ONE TABLET BY MOUTH ONCE DAILY 90 tablet 1   • Probiotic Product (PROBIOTIC ADVANCED PO) Take 1 tablet by mouth 2 (Two) Times a Day.     • sotalol (BETAPACE AF) 80 MG tablet tablet Take 1 tablet by mouth Every 12 (Twelve) Hours. 180 tablet 3   • tamsulosin (FLOMAX) 0.4 MG capsule 24 hr capsule Take 1 capsule by mouth Daily. 90 capsule 1           Blood pressure 138/78, pulse (!) 144, height 190.5 cm (75\"), weight 119 kg (262 lb 4.8 oz).  Body mass index is 32.79 kg/m².  There were no vitals filed for this visit.    Physical Exam   Constitutional: He is oriented to person, place, and time. He appears well-developed and well-nourished.   HENT:   Head: Normocephalic and atraumatic.   Eyes: Pupils are equal, round, and reactive to light. No scleral icterus.   Neck: No JVD present. Carotid bruit is not present. No thyromegaly present.   Cardiovascular: Normal rate and regular rhythm.  Exam reveals no gallop.    No murmur heard.  Pulmonary/Chest: Effort normal and breath sounds normal.   Abdominal: Soft. He exhibits no distension. There is no " hepatosplenomegaly.   Musculoskeletal: He exhibits no edema.   Neurological: He is alert and oriented to person, place, and time.   Skin: Skin is warm and dry.   Psychiatric: He has a normal mood and affect. His behavior is normal.       Data Review:       ECG 12 Lead  Date/Time: 10/23/2018 3:46 PM  Performed by: YEVGENIY MOTT  Authorized by: YEVGENIY MOTT   Comparison: compared with previous ECG from 8/22/2017  Comparison to previous ECG: Atrial fibrillation is replaced normal sinus rhythm  Rhythm: atrial fibrillation  Ectopy: trigeminy  BPM: 144  Clinical impression: abnormal ECG  Comments: Atrial fibrillation with rapid ventricular sponsor  QRS duration 84 MS  QT/QTC 3 tan/479 a minute            Lab Results   Component Value Date    CHOL 98 08/15/2018    TRIG 159 (H) 08/15/2018    HDL 28 (L) 08/15/2018    LDL 59 08/15/2018    AST 19 08/15/2018    ALT 24 08/15/2018       Lab Results   Component Value Date    HGBA1C 6.80 (H) 08/15/2018            Problem List Items Addressed This Visit        Cardiovascular and Mediastinum    Essential hypertension (Chronic)    Current Assessment & Plan     · Hypertension is controlled  · Continue lisinopril 40 mg daily  · Continue hydrochlorothiazide 12.5 mg daily  · Continue amlodipine 10 mg daily         Hyperlipidemia LDL goal <100    Overview     · Moderate to high intensity statin therapy indicated given the presence diabetes         Current Assessment & Plan     · Continue pravastatin 40 mg daily         Paroxysmal atrial fibrillation (CMS/HCC) - Primary    Overview     · Diagnosed August 2012.   · FARHAD-guided ECV (08/17/2012).  · CHADS-VASc score is 5, on Eliquis.         Current Assessment & Plan     · Continue sotalol 80 mg twice a day  · Continue Eliquis 5 mg twice a day         Relevant Orders    Adult Transthoracic Echo Complete W/ Cont if Necessary Per Protocol        Patient is in atrial fibrillation with RVR.  I've instructed him to take an extra dose of  sotalol tonight and tomorrow.  We will schedule him for an external cardioversion this Thursday.  I have spoken to our nurse who will check on re-enrollment in the reduced fee program for Eliquis.  I have given the patient samples to use for now.  Patient will also need a echocardiogram and hopefully can be performed on same day as his cardioversion       · Take extra dose of sotalol tonight and tomorrow  · External cardioversion on Thursday  · Enrolling reduced fee program for Eliquis  · Further recommendations to follow    Ami Ansari, BERYL  10/23/2018

## 2018-10-25 NOTE — INTERVAL H&P NOTE
H&P reviewed. The patient was examined and there are no changes to the H&P.       Patient presents today to undergo cardioversion for his paroxysmal atrial fibrillation.  He is chronically anticoagulated with Eliquis and has not missed any doses.      Active Hospital Problems    Diagnosis   • **Paroxysmal atrial fibrillation (CMS/HCC)     · Diagnosed August 2012.   · FARHAD-guided ECV (08/17/2012).  · CHADS-VASc score is 5, on Eliquis.  · Presented for f/u in clinic on 10/23/2018 and noted to be in atrial fibrillation   · External cardioversion (10/25/2018)       Plan:    · Proceed with external cardioversion  · Likely need sotalol dose increased  · Further recommendations to follow      Blanquita rubin for Dr. Taurus Oliveros

## 2018-11-24 DIAGNOSIS — E78.5 HYPERLIPIDEMIA LDL GOAL <100: ICD-10-CM

## 2018-11-26 RX ORDER — TAMSULOSIN HYDROCHLORIDE 0.4 MG/1
CAPSULE ORAL
Qty: 90 CAPSULE | Refills: 1 | Status: SHIPPED | OUTPATIENT
Start: 2018-11-26 | End: 2019-06-01 | Stop reason: SDUPTHER

## 2018-11-26 RX ORDER — PRAVASTATIN SODIUM 40 MG
TABLET ORAL
Qty: 90 TABLET | Refills: 3 | Status: SHIPPED | OUTPATIENT
Start: 2018-11-26 | End: 2018-12-17

## 2018-12-17 ENCOUNTER — OFFICE VISIT (OUTPATIENT)
Dept: INTERNAL MEDICINE | Facility: CLINIC | Age: 83
End: 2018-12-17

## 2018-12-17 VITALS
SYSTOLIC BLOOD PRESSURE: 132 MMHG | HEART RATE: 60 BPM | RESPIRATION RATE: 16 BRPM | BODY MASS INDEX: 32.75 KG/M2 | DIASTOLIC BLOOD PRESSURE: 64 MMHG | WEIGHT: 262 LBS | TEMPERATURE: 97.7 F

## 2018-12-17 DIAGNOSIS — E78.5 HYPERLIPIDEMIA, UNSPECIFIED HYPERLIPIDEMIA TYPE: ICD-10-CM

## 2018-12-17 DIAGNOSIS — I10 ESSENTIAL HYPERTENSION: ICD-10-CM

## 2018-12-17 DIAGNOSIS — E11.9 TYPE 2 DIABETES MELLITUS WITHOUT COMPLICATION, WITHOUT LONG-TERM CURRENT USE OF INSULIN (HCC): Primary | ICD-10-CM

## 2018-12-17 DIAGNOSIS — K21.00 GASTROESOPHAGEAL REFLUX DISEASE WITH ESOPHAGITIS: ICD-10-CM

## 2018-12-17 LAB
ALBUMIN SERPL-MCNC: 4.16 G/DL (ref 3.2–4.8)
ALBUMIN/GLOB SERPL: 1.8 G/DL (ref 1.5–2.5)
ALP SERPL-CCNC: 72 U/L (ref 25–100)
ALT SERPL W P-5'-P-CCNC: 31 U/L (ref 7–40)
ANION GAP SERPL CALCULATED.3IONS-SCNC: 4 MMOL/L (ref 3–11)
ARTICHOKE IGE QN: 62 MG/DL (ref 0–130)
AST SERPL-CCNC: 22 U/L (ref 0–33)
BILIRUB SERPL-MCNC: 0.9 MG/DL (ref 0.3–1.2)
BUN BLD-MCNC: 19 MG/DL (ref 9–23)
BUN/CREAT SERPL: 27.5 (ref 7–25)
CALCIUM SPEC-SCNC: 9 MG/DL (ref 8.7–10.4)
CHLORIDE SERPL-SCNC: 106 MMOL/L (ref 99–109)
CHOLEST SERPL-MCNC: 104 MG/DL (ref 0–200)
CO2 SERPL-SCNC: 31 MMOL/L (ref 20–31)
CREAT BLD-MCNC: 0.69 MG/DL (ref 0.6–1.3)
GFR SERPL CREATININE-BSD FRML MDRD: 110 ML/MIN/1.73
GLOBULIN UR ELPH-MCNC: 2.3 GM/DL
GLUCOSE BLD-MCNC: 160 MG/DL (ref 70–100)
HBA1C MFR BLD: 6.4 % (ref 4.8–5.6)
HDLC SERPL-MCNC: 31 MG/DL (ref 40–60)
POTASSIUM BLD-SCNC: 4.2 MMOL/L (ref 3.5–5.5)
PROT SERPL-MCNC: 6.5 G/DL (ref 5.7–8.2)
SODIUM BLD-SCNC: 141 MMOL/L (ref 132–146)
TRIGL SERPL-MCNC: 162 MG/DL (ref 0–150)

## 2018-12-17 PROCEDURE — 80053 COMPREHEN METABOLIC PANEL: CPT | Performed by: INTERNAL MEDICINE

## 2018-12-17 PROCEDURE — 99214 OFFICE O/P EST MOD 30 MIN: CPT | Performed by: INTERNAL MEDICINE

## 2018-12-17 PROCEDURE — 83036 HEMOGLOBIN GLYCOSYLATED A1C: CPT | Performed by: INTERNAL MEDICINE

## 2018-12-17 PROCEDURE — 80061 LIPID PANEL: CPT | Performed by: INTERNAL MEDICINE

## 2018-12-17 PROCEDURE — 36415 COLL VENOUS BLD VENIPUNCTURE: CPT | Performed by: INTERNAL MEDICINE

## 2018-12-17 NOTE — PROGRESS NOTES
Chief Complaint   Patient presents with   • Follow-up     5 MONTH FOLLOW UP CHRONIC MEDICAL PROBLEMS       History of Present Illness      The patient presents for a follow-up related to Type 2 Diabetes Mellitus and reports that he doesn't check his blood sugars at home. He denies hypoglycemic symptoms. The patient denies polyuria, polydypsia or polyphagia. He reports no symptoms of neuropathy. The patient takes his medication as prescribed. He is not taking insulin. The patient does check his feet daily at home. He denies chest pain, shortness of breath, orthopnea, paroxysmal nocturnal dyspnea, dyspnea on exertion, edema, palpitations or syncope.    The patient presents for a follow-up related to GERD. The patient is on omeprazole for his gastroesophageal reflux. The medication is taken on a regular basis and gives complete relief of the symptoms. He reports no abdominal pain, belching, diarrhea, dysphagia, early satiety, heartburn, hoarseness, nausea, odynophagia, rectal bleeding, vomiting or weight loss. The GERD has no known aggravating factors.    The patient presents for a follow-up related to hypertension. The patient reports that he has had no headaches or blurred vision. He states that he is taking his medication as prescribed. He is not having medication side effects.    The patient presents for a follow-up related to hyperlipidemia. He is following a low fat diet. He reports that he is exercising. He is taking pravastatin. The patient is taking his medication as prescribed. He reports no medication side effects, including muscle cramps, abdominal pain, headaches or weakness.    Review of Systems    CONSTITUTIONAL- Denies Fever, Chills, Sweats, Fatigue, Weakness or Malaise.    PULMONARY- Denies Wheezing, Sputum Production, Cough, Hemoptysis or Pleuritic Chest Pain.    GASTROINTESTINAL- Denies Constipation.    CARDIOVASCULAR- Denies Claudication or Irregular Heart Beat.    ENDOCRINE- Denies Cold  Intolerance, Depression, Hair Loss, Heat Intolerance, Memory Loss, Sleep Disturbance or Weight Gain.    Medications      Current Outpatient Medications:   •  allopurinol (ZYLOPRIM) 300 MG tablet, TAKE 1 TABLET BY MOUTH ONCE DAILY, Disp: 90 tablet, Rfl: 1  •  amLODIPine (NORVASC) 10 MG tablet, TAKE ONE TABLET BY MOUTH ONCE DAILY, Disp: 90 tablet, Rfl: 0  •  Ascorbic Acid (VITAMIN C) 500 MG capsule, Take 1 tablet by mouth 4 (four) times a day., Disp: , Rfl:   •  docusate sodium (COLACE) 100 MG capsule, Take 300 mg by mouth every night., Disp: , Rfl:   •  ELIQUIS 5 MG tablet tablet, TAKE ONE TABLET BY MOUTH EVERY 12 HOURS, Disp: 180 tablet, Rfl: 0  •  Ferrous Gluconate (IRON) 240 (27 FE) MG tablet, Take 1 tablet by mouth daily., Disp: , Rfl:   •  finasteride (PROSCAR) 5 MG tablet, Take 1 tablet by mouth Every Night., Disp: 90 tablet, Rfl: 1  •  hydrochlorothiazide (MICROZIDE) 12.5 MG capsule, TAKE ONE CAPSULE BY MOUTH IN THE MORNING, Disp: 90 capsule, Rfl: 0  •  lisinopril (PRINIVIL,ZESTRIL) 40 MG tablet, TAKE ONE TABLET BY MOUTH ONCE DAILY, Disp: 90 tablet, Rfl: 3  •  metFORMIN (GLUCOPHAGE) 1000 MG tablet, TAKE 1 TABLET BY MOUTH TWICE DAILY, Disp: 180 tablet, Rfl: 1  •  methenamine (HIPREX) 1 g tablet, TAKE 1/2 (ONE-HALF) TABLET BY MOUTH TWICE DAILY WITH MEALS, Disp: 90 tablet, Rfl: 1  •  omeprazole (priLOSEC) 20 MG capsule, TAKE ONE CAPSULE BY MOUTH ONCE DAILY, Disp: 90 capsule, Rfl: 1  •  pravastatin (PRAVACHOL) 40 MG tablet, TAKE ONE TABLET BY MOUTH ONCE DAILY, Disp: 90 tablet, Rfl: 1  •  Probiotic Product (PROBIOTIC ADVANCED PO), Take 1 tablet by mouth 2 (Two) Times a Day., Disp: , Rfl:   •  sotalol (BETAPACE AF) 80 MG tablet tablet, Take 1 tablet by mouth Every 12 (Twelve) Hours., Disp: 180 tablet, Rfl: 3  •  tamsulosin (FLOMAX) 0.4 MG capsule 24 hr capsule, TAKE 1 CAPSULE BY MOUTH ONCE DAILY, Disp: 90 capsule, Rfl: 1     Allergies    Allergies   Allergen Reactions   • Penicillins Hives   • Xarelto [Rivaroxaban]       GI bleed       Problem List    Patient Active Problem List   Diagnosis   • Atopic rhinitis   • Benign (familial) paraproteinemia   • Type 2 diabetes mellitus, without long-term current use of insulin (CMS/HCC)   • Enlarged prostate without lower urinary tract symptoms (luts)   • Gastroesophageal reflux disease with esophagitis   • Polyp of gallbladder   • Gout   • Hematuria   • Liver cyst   • Hyperlipidemia LDL goal <100   • Essential hypertension   • Nephrolithiasis   • Obstructive sleep apnea syndrome   • Paroxysmal atrial fibrillation (CMS/HCC)   • Ingrowing toenail   • Stage 3 chronic kidney disease (CMS/HCC)       Medications, Allergies, Problems List and Past History were reviewed and updated.    Physical Examination    /64 (BP Location: Left arm, Patient Position: Sitting, Cuff Size: Adult)   Pulse 60   Temp 97.7 °F (36.5 °C) (Temporal)   Resp 16   Wt 119 kg (262 lb)   BMI 32.75 kg/m²     HEENT: Facies- Within normal limits. Lids- Normal bilaterally. Conjunctiva- Clear bilaterally. Sclera- Anicteric bilaterally.    Neck: Thyroid- non enlarged, symmetric and has no nodules. No bruits are detected.    Lungs: Auscultation- Clear to auscultation bilaterally. There are no retractions, clubbing or cyanosis. The Expiratory to Inspiratory ratio is equal.    Cardiovascular: There are no carotid bruits. Heart- Normal Rate with Regular rhythm and no murmurs. There are no gallops. There are no rubs. In the lower extremities there is no edema. The upper extremities do not have edema.    Abdomen: Soft, benign, non-tender with no masses, hernias, organomegaly or scars.    Impression and Assessment    Type 2 Diabetes Mellitus without complication without insulin usage.    Gastroesophageal Reflux Disease.    Essential Hypertension.    Hyperlipidemia.    Plan    Gastroesophageal Reflux Disease Plan: The current plan was continued.    Essential Hypertension Plan: The current plan was continued.    Hyperlipidemia  Plan: The patient was instructed to exercise daily, eat a low fat diet and continue his medications.    Type 2 Diabetes Mellitus without complication without insulin usage Plan: The current plan was continued.    Darien was seen today for follow-up.    Diagnoses and all orders for this visit:    Type 2 diabetes mellitus without complication, without long-term current use of insulin (CMS/[facility])  -     Comprehensive Metabolic Panel  -     Hemoglobin A1c    Gastroesophageal reflux disease with esophagitis  -     Comprehensive Metabolic Panel    Hyperlipidemia, unspecified hyperlipidemia type  -     Comprehensive Metabolic Panel  -     Lipid Panel    Essential hypertension  -     Comprehensive Metabolic Panel        Return to Office    The patient was instructed to return for follow-up in 4 months.    The patient was instructed to return sooner if the condition changes, worsens, or does not resolve.

## 2018-12-24 DIAGNOSIS — I10 ESSENTIAL HYPERTENSION: Chronic | ICD-10-CM

## 2018-12-24 RX ORDER — AMLODIPINE BESYLATE 10 MG/1
TABLET ORAL
Qty: 90 TABLET | Refills: 0 | Status: SHIPPED | OUTPATIENT
Start: 2018-12-24 | End: 2019-03-30 | Stop reason: SDUPTHER

## 2018-12-24 RX ORDER — HYDROCHLOROTHIAZIDE 12.5 MG/1
CAPSULE, GELATIN COATED ORAL
Qty: 90 CAPSULE | Refills: 0 | Status: SHIPPED | OUTPATIENT
Start: 2018-12-24 | End: 2019-03-30 | Stop reason: SDUPTHER

## 2019-01-07 RX ORDER — OMEPRAZOLE 20 MG/1
CAPSULE, DELAYED RELEASE ORAL
Qty: 90 CAPSULE | Refills: 1 | Status: SHIPPED | OUTPATIENT
Start: 2019-01-07 | End: 2019-06-22 | Stop reason: SDUPTHER

## 2019-01-17 ENCOUNTER — TELEPHONE (OUTPATIENT)
Dept: CARDIOLOGY | Facility: CLINIC | Age: 84
End: 2019-01-17

## 2019-01-17 DIAGNOSIS — I48.0 PAROXYSMAL ATRIAL FIBRILLATION (HCC): ICD-10-CM

## 2019-01-17 NOTE — TELEPHONE ENCOUNTER
Patient called requesting samples of Eliquis. I tried multiple times to contact him and unable to reach him or leave a message. Samples left at  and tier exception submitted.

## 2019-01-21 RX ORDER — FINASTERIDE 5 MG/1
TABLET, FILM COATED ORAL
Qty: 90 TABLET | Refills: 1 | Status: SHIPPED | OUTPATIENT
Start: 2019-01-21 | End: 2019-07-09 | Stop reason: SDUPTHER

## 2019-02-04 RX ORDER — METHENAMINE HIPPURATE 1000 MG/1
TABLET ORAL
Qty: 90 TABLET | Refills: 1 | Status: SHIPPED | OUTPATIENT
Start: 2019-02-04 | End: 2019-08-03 | Stop reason: SDUPTHER

## 2019-02-18 NOTE — PROGRESS NOTES
[facility]  Outpatient Follow-up Visit    Darien Camarena  1935  PCP: Pito Way MD      ID:  Darien Camarena is a 83 y.o. male from [place], [place].     Chief Complaint   Patient presents with   • Atrial Fibrillation   • Hypertension            The patient returns today in follow-up of his paroxysmal atrial fibrillation, hypertension and hyperlipidemia. Patient has been well from a cardiovascular standpoint. He received cardioversion last year after 3 days of atrial fibrillation for which she was minimally symptomatic. Denies chest pain, palpitations, TIA, or stroke symptoms. He reports that his Eliquis is costing him $300 a month presently. Evidently tier exception was denied this year.        Allergies   Allergen Reactions   • Penicillins Hives   • Xarelto [Rivaroxaban]      GI bleed       Current Outpatient Medications:   •  allopurinol (ZYLOPRIM) 300 MG tablet, TAKE 1 TABLET BY MOUTH ONCE DAILY, Disp: 90 tablet, Rfl: 1  •  amLODIPine (NORVASC) 10 MG tablet, TAKE 1 TABLET BY MOUTH ONCE DAILY, Disp: 90 tablet, Rfl: 0  •  apixaban (ELIQUIS) 5 MG tablet tablet, Take 1 tablet by mouth Every 12 (Twelve) Hours., Disp: 180 tablet, Rfl: 3  •  Ascorbic Acid (VITAMIN C) 500 MG capsule, Take 1 tablet by mouth 4 (four) times a day., Disp: , Rfl:   •  docusate sodium (COLACE) 100 MG capsule, Take 300 mg by mouth every night., Disp: , Rfl:   •  Ferrous Gluconate (IRON) 240 (27 FE) MG tablet, Take 1 tablet by mouth daily., Disp: , Rfl:   •  finasteride (PROSCAR) 5 MG tablet, TAKE 1 TABLET BY MOUTH AT NIGHT, Disp: 90 tablet, Rfl: 1  •  hydrochlorothiazide (MICROZIDE) 12.5 MG capsule, TAKE 1 CAPSULE BY MOUTH ONCE DAILY IN THE MORNING, Disp: 90 capsule, Rfl: 0  •  lisinopril (PRINIVIL,ZESTRIL) 40 MG tablet, TAKE ONE TABLET BY MOUTH ONCE DAILY, Disp: 90 tablet, Rfl: 3  •  metFORMIN (GLUCOPHAGE) 1000 MG tablet, TAKE 1 TABLET BY MOUTH TWICE DAILY, Disp: 180 tablet, Rfl:  1  •  methenamine (HIPREX) 1 g tablet, TAKE 1/2 (ONE-HALF) TABLET BY MOUTH TWICE DAILY WITH MEALS, Disp: 90 tablet, Rfl: 1  •  omeprazole (priLOSEC) 20 MG capsule, TAKE 1 CAPSULE BY MOUTH ONCE DAILY, Disp: 90 capsule, Rfl: 1  •  pravastatin (PRAVACHOL) 40 MG tablet, TAKE ONE TABLET BY MOUTH ONCE DAILY, Disp: 90 tablet, Rfl: 1  •  Probiotic Product (PROBIOTIC ADVANCED PO), Take 1 tablet by mouth 2 (Two) Times a Day., Disp: , Rfl:   •  sotalol (BETAPACE AF) 80 MG tablet tablet, Take 1 tablet by mouth Every 12 (Twelve) Hours., Disp: 180 tablet, Rfl: 3  •  tamsulosin (FLOMAX) 0.4 MG capsule 24 hr capsule, TAKE 1 CAPSULE BY MOUTH ONCE DAILY, Disp: 90 capsule, Rfl: 1    Past Medical History, Past Surgical History, Family history, Social History, and Medications were all reviewed with the patient today and updated as necessary.     Past Medical History:   Diagnosis Date   • Atrial fibrillation (CMS/HCC)    • Chronic kidney disease    • Diabetes mellitus (CMS/HCC)    • Gout    • History of colonoscopy 2012   • Hyperlipidemia    • Hypertension    • PAF (paroxysmal atrial fibrillation) (CMS/HCC)    • Prostatism    • Sleep apnea     CPAP HS     Past Surgical History:   Procedure Laterality Date   • CARDIOVERSION     • CATARACT EXTRACTION Left    • KIDNEY STONE SURGERY       Family History   Problem Relation Age of Onset   • Alzheimer's disease Mother    • Cancer Father    • COPD Father      Social History     Tobacco Use   • Smoking status: Former Smoker     Last attempt to quit: 1960     Years since quittin.8   • Smokeless tobacco: Never Used   • Tobacco comment: started at 15 yo.   Substance Use Topics   • Alcohol use: No       Review of Systems   Constitution: Negative for weakness and malaise/fatigue.   Eyes: Negative for vision loss in left eye and vision loss in right eye.   Cardiovascular: Positive for irregular heartbeat. Negative for chest pain, dyspnea on exertion, near-syncope, orthopnea,  "palpitations, paroxysmal nocturnal dyspnea and syncope.   Musculoskeletal: Negative for myalgias.   Neurological: Negative for brief paralysis, excessive daytime sleepiness, focal weakness, numbness and paresthesias.   All other systems reviewed and are negative.              /82 (BP Location: Right arm, Patient Position: Sitting)   Pulse 60   Ht 190.5 cm (75\")   Wt 121 kg (267 lb)   BMI 33.37 kg/m²        Wt Readings from Last 5 Encounters:   02/19/19 121 kg (267 lb)   12/17/18 119 kg (262 lb)   10/25/18 119 kg (261 lb 14.5 oz)   10/23/18 119 kg (262 lb 4.8 oz)   08/15/18 119 kg (262 lb 4 oz)       BP Readings from Last 5 Encounters:   02/19/19 152/82   12/17/18 132/64   10/25/18 119/79   10/23/18 138/78   08/15/18 130/64       Physical Exam   Constitutional: He is oriented to person, place, and time. He appears well-developed and well-nourished.   HENT:   Head: Normocephalic and atraumatic.   Eyes: Pupils are equal, round, and reactive to light. No scleral icterus.   Neck: No JVD present. Carotid bruit is not present. No thyromegaly present.   Cardiovascular: Normal rate, regular rhythm, S1 normal and S2 normal. Exam reveals no gallop.   No murmur heard.  Pulmonary/Chest: Effort normal and breath sounds normal.   Abdominal: Soft. There is no hepatosplenomegaly. There is no tenderness.   Neurological: He is alert and oriented to person, place, and time.   Skin: Skin is warm and dry. No cyanosis. Nails show no clubbing.   Psychiatric: He has a normal mood and affect. His behavior is normal.       EKG: (EKG has been independently visualized by me and summarized below)      ECG 12 Lead  Date/Time: 2/19/2019 3:43 PM  Performed by: Titus Oliveros IV, MD  Authorized by: Titus Oliveros IV, MD   Rhythm: sinus rhythm  BPM: 60    Clinical impression: non-specific ECG  Comments: QTc interval 412 ms           Lab Results   Component Value Date    CHOL 104 12/17/2018    TRIG 162 (H) 12/17/2018    " HDL 31 (L) 12/17/2018    LDL 62 12/17/2018     Lab Results   Component Value Date    GLUCOSE 160 (H) 12/17/2018    BUN 19 12/17/2018    CREATININE 0.69 12/17/2018    EGFRIFNONA 110 12/17/2018    BCR 27.5 (H) 12/17/2018    K 4.2 12/17/2018    CO2 31.0 12/17/2018    CALCIUM 9.0 12/17/2018    ALBUMIN 4.16 12/17/2018    AST 22 12/17/2018    ALT 31 12/17/2018     Lab Results   Component Value Date    HGBA1C 6.40 (H) 12/17/2018                Problem List Items Addressed This Visit        Cardiology Problems    Paroxysmal atrial fibrillation (CMS/HCC) - Primary    Overview     · Diagnosed August 2012.   · FARHAD-guided ECV (08/17/2012).  · CHADS-VASc 5 (age > 75, CAD, HTN, DM)  · Successful external cardioversion for asymptomatic atrial fibrillation with RVR, 10/25/18         Current Assessment & Plan     · Occasional minimally symptomatic recurrences of atrial fibrillation  · Continue sotalol at current dosing and Eliquis  · If atrial fibrillation becomes more frequent or persistent, may need to transition to Tikosyn or rate control strategy         Essential hypertension (Chronic)    Current Assessment & Plan     · Hypertension is controlled  · Continue present medical therapy         Hyperlipidemia LDL goal <100    Overview     · Moderate to high intensity statin therapy indicated given the presence diabetes         Current Assessment & Plan     · Continue pravastatin            Other    Type 2 diabetes mellitus, without long-term current use of insulin (CMS/HCC)    Current Assessment & Plan     · ACE inhibitor and statin therapy indicated given diabetic status                    · Continue present medical therapy  Return in about 6 months (around 8/19/2019).          RYANNE Oliveros M.D., [facility], [facility]  Interventional Cardiology  2/19/2019  5:40 PM

## 2019-02-19 ENCOUNTER — OFFICE VISIT (OUTPATIENT)
Dept: CARDIOLOGY | Facility: CLINIC | Age: 84
End: 2019-02-19

## 2019-02-19 VITALS
SYSTOLIC BLOOD PRESSURE: 152 MMHG | HEIGHT: 75 IN | WEIGHT: 267 LBS | DIASTOLIC BLOOD PRESSURE: 82 MMHG | BODY MASS INDEX: 33.2 KG/M2 | HEART RATE: 60 BPM

## 2019-02-19 DIAGNOSIS — I10 ESSENTIAL HYPERTENSION: Chronic | ICD-10-CM

## 2019-02-19 DIAGNOSIS — E11.9 TYPE 2 DIABETES MELLITUS WITHOUT COMPLICATION, WITHOUT LONG-TERM CURRENT USE OF INSULIN (HCC): ICD-10-CM

## 2019-02-19 DIAGNOSIS — E78.5 HYPERLIPIDEMIA LDL GOAL <100: ICD-10-CM

## 2019-02-19 DIAGNOSIS — I48.0 PAROXYSMAL ATRIAL FIBRILLATION (HCC): Primary | ICD-10-CM

## 2019-02-19 PROCEDURE — 93000 ELECTROCARDIOGRAM COMPLETE: CPT | Performed by: INTERNAL MEDICINE

## 2019-02-19 PROCEDURE — 99214 OFFICE O/P EST MOD 30 MIN: CPT | Performed by: INTERNAL MEDICINE

## 2019-02-19 NOTE — ASSESSMENT & PLAN NOTE
· Occasional minimally symptomatic recurrences of atrial fibrillation  · Continue sotalol at current dosing and Eliquis  · If atrial fibrillation becomes more frequent or persistent, may need to transition to Tikosyn or rate control strategy

## 2019-03-06 ENCOUNTER — HOSPITAL ENCOUNTER (INPATIENT)
Facility: HOSPITAL | Age: 84
LOS: 1 days | Discharge: HOME | End: 2019-03-06
Attending: EMERGENCY MEDICINE | Admitting: INTERNAL MEDICINE

## 2019-03-06 ENCOUNTER — APPOINTMENT (OUTPATIENT)
Dept: CARDIOLOGY | Facility: HOSPITAL | Age: 84
End: 2019-03-06

## 2019-03-06 ENCOUNTER — APPOINTMENT (OUTPATIENT)
Dept: GENERAL RADIOLOGY | Facility: HOSPITAL | Age: 84
End: 2019-03-06

## 2019-03-06 VITALS
RESPIRATION RATE: 16 BRPM | DIASTOLIC BLOOD PRESSURE: 91 MMHG | HEART RATE: 64 BPM | TEMPERATURE: 96.9 F | WEIGHT: 267 LBS | BODY MASS INDEX: 33.2 KG/M2 | OXYGEN SATURATION: 95 % | HEIGHT: 75 IN | SYSTOLIC BLOOD PRESSURE: 139 MMHG

## 2019-03-06 DIAGNOSIS — I48.91 ATRIAL FIBRILLATION, UNSPECIFIED TYPE (HCC): Primary | ICD-10-CM

## 2019-03-06 DIAGNOSIS — I48.0 PAROXYSMAL ATRIAL FIBRILLATION (HCC): ICD-10-CM

## 2019-03-06 LAB
ALBUMIN SERPL-MCNC: 4.19 G/DL (ref 3.2–4.8)
ALBUMIN/GLOB SERPL: 1.5 G/DL (ref 1.5–2.5)
ALP SERPL-CCNC: 75 U/L (ref 25–100)
ALT SERPL W P-5'-P-CCNC: 23 U/L (ref 7–40)
ANION GAP SERPL CALCULATED.3IONS-SCNC: 9 MMOL/L (ref 3–11)
AST SERPL-CCNC: 19 U/L (ref 0–33)
BASOPHILS # BLD AUTO: 0.04 10*3/MM3 (ref 0–0.2)
BASOPHILS NFR BLD AUTO: 0.6 % (ref 0–1)
BILIRUB SERPL-MCNC: 1 MG/DL (ref 0.3–1.2)
BNP SERPL-MCNC: 207 PG/ML (ref 0–100)
BUN BLD-MCNC: 17 MG/DL (ref 9–23)
BUN/CREAT SERPL: 21.5 (ref 7–25)
CALCIUM SPEC-SCNC: 9 MG/DL (ref 8.7–10.4)
CHLORIDE SERPL-SCNC: 106 MMOL/L (ref 99–109)
CO2 SERPL-SCNC: 26 MMOL/L (ref 20–31)
CREAT BLD-MCNC: 0.79 MG/DL (ref 0.6–1.3)
DEPRECATED RDW RBC AUTO: 46.5 FL (ref 37–54)
EOSINOPHIL # BLD AUTO: 0.35 10*3/MM3 (ref 0–0.3)
EOSINOPHIL NFR BLD AUTO: 5.1 % (ref 0–3)
ERYTHROCYTE [DISTWIDTH] IN BLOOD BY AUTOMATED COUNT: 13.6 % (ref 11.3–14.5)
GFR SERPL CREATININE-BSD FRML MDRD: 94 ML/MIN/1.73
GLOBULIN UR ELPH-MCNC: 2.7 GM/DL
GLUCOSE BLD-MCNC: 169 MG/DL (ref 70–100)
HCT VFR BLD AUTO: 45 % (ref 38.9–50.9)
HGB BLD-MCNC: 15.1 G/DL (ref 13.1–17.5)
HOLD SPECIMEN: NORMAL
HOLD SPECIMEN: NORMAL
IMM GRANULOCYTES # BLD AUTO: 0.02 10*3/MM3 (ref 0–0.05)
IMM GRANULOCYTES NFR BLD AUTO: 0.3 % (ref 0–0.6)
LYMPHOCYTES # BLD AUTO: 1.6 10*3/MM3 (ref 0.6–4.8)
LYMPHOCYTES NFR BLD AUTO: 23.3 % (ref 24–44)
MAGNESIUM SERPL-MCNC: 1.8 MG/DL (ref 1.3–2.7)
MCH RBC QN AUTO: 31.3 PG (ref 27–31)
MCHC RBC AUTO-ENTMCNC: 33.6 G/DL (ref 32–36)
MCV RBC AUTO: 93.4 FL (ref 80–99)
MONOCYTES # BLD AUTO: 0.6 10*3/MM3 (ref 0–1)
MONOCYTES NFR BLD AUTO: 8.7 % (ref 0–12)
NEUTROPHILS # BLD AUTO: 4.26 10*3/MM3 (ref 1.5–8.3)
NEUTROPHILS NFR BLD AUTO: 62 % (ref 41–71)
PLATELET # BLD AUTO: 184 10*3/MM3 (ref 150–450)
PMV BLD AUTO: 10.6 FL (ref 6–12)
POTASSIUM BLD-SCNC: 4.3 MMOL/L (ref 3.5–5.5)
PROT SERPL-MCNC: 6.9 G/DL (ref 5.7–8.2)
RBC # BLD AUTO: 4.82 10*6/MM3 (ref 4.2–5.76)
SODIUM BLD-SCNC: 141 MMOL/L (ref 132–146)
TROPONIN I SERPL-MCNC: 0 NG/ML (ref 0–0.07)
TROPONIN I SERPL-MCNC: 0.18 NG/ML (ref 0–0.07)
TSH SERPL DL<=0.05 MIU/L-ACNC: 2.67 MIU/ML (ref 0.35–5.35)
WBC NRBC COR # BLD: 6.87 10*3/MM3 (ref 3.5–10.8)
WHOLE BLOOD HOLD SPECIMEN: NORMAL
WHOLE BLOOD HOLD SPECIMEN: NORMAL

## 2019-03-06 PROCEDURE — 85025 COMPLETE CBC W/AUTO DIFF WBC: CPT | Performed by: EMERGENCY MEDICINE

## 2019-03-06 PROCEDURE — 84484 ASSAY OF TROPONIN QUANT: CPT

## 2019-03-06 PROCEDURE — 25010000002 PROPOFOL 10 MG/ML EMULSION: Performed by: EMERGENCY MEDICINE

## 2019-03-06 PROCEDURE — 92960 CARDIOVERSION ELECTRIC EXT: CPT | Performed by: INTERNAL MEDICINE

## 2019-03-06 PROCEDURE — 99235 HOSP IP/OBS SAME DATE MOD 70: CPT | Performed by: INTERNAL MEDICINE

## 2019-03-06 PROCEDURE — 93005 ELECTROCARDIOGRAM TRACING: CPT

## 2019-03-06 PROCEDURE — 93005 ELECTROCARDIOGRAM TRACING: CPT | Performed by: EMERGENCY MEDICINE

## 2019-03-06 PROCEDURE — 5A2204Z RESTORATION OF CARDIAC RHYTHM, SINGLE: ICD-10-PCS | Performed by: INTERNAL MEDICINE

## 2019-03-06 PROCEDURE — 71045 X-RAY EXAM CHEST 1 VIEW: CPT

## 2019-03-06 PROCEDURE — 25010000002 MIDAZOLAM PER 1 MG: Performed by: INTERNAL MEDICINE

## 2019-03-06 PROCEDURE — 92960 CARDIOVERSION ELECTRIC EXT: CPT

## 2019-03-06 PROCEDURE — 80053 COMPREHEN METABOLIC PANEL: CPT | Performed by: EMERGENCY MEDICINE

## 2019-03-06 PROCEDURE — 84443 ASSAY THYROID STIM HORMONE: CPT | Performed by: EMERGENCY MEDICINE

## 2019-03-06 PROCEDURE — 99285 EMERGENCY DEPT VISIT HI MDM: CPT

## 2019-03-06 PROCEDURE — 83880 ASSAY OF NATRIURETIC PEPTIDE: CPT | Performed by: EMERGENCY MEDICINE

## 2019-03-06 PROCEDURE — 93005 ELECTROCARDIOGRAM TRACING: CPT | Performed by: INTERNAL MEDICINE

## 2019-03-06 PROCEDURE — 83735 ASSAY OF MAGNESIUM: CPT | Performed by: EMERGENCY MEDICINE

## 2019-03-06 RX ORDER — PROPOFOL 10 MG/ML
VIAL (ML) INTRAVENOUS
Status: COMPLETED | OUTPATIENT
Start: 2019-03-06 | End: 2019-03-06

## 2019-03-06 RX ORDER — MAGNESIUM SULFATE HEPTAHYDRATE 40 MG/ML
2 INJECTION, SOLUTION INTRAVENOUS AS NEEDED
Status: CANCELLED | OUTPATIENT
Start: 2019-03-06

## 2019-03-06 RX ORDER — MAGNESIUM SULFATE HEPTAHYDRATE 40 MG/ML
4 INJECTION, SOLUTION INTRAVENOUS AS NEEDED
Status: CANCELLED | OUTPATIENT
Start: 2019-03-06

## 2019-03-06 RX ORDER — TAMSULOSIN HYDROCHLORIDE 0.4 MG/1
0.4 CAPSULE ORAL DAILY
Status: CANCELLED | OUTPATIENT
Start: 2019-03-06

## 2019-03-06 RX ORDER — ATORVASTATIN CALCIUM 10 MG/1
10 TABLET, FILM COATED ORAL DAILY
Status: CANCELLED | OUTPATIENT
Start: 2019-03-06

## 2019-03-06 RX ORDER — SOTALOL HYDROCHLORIDE 80 MG/1
40 TABLET ORAL ONCE
Status: CANCELLED | OUTPATIENT
Start: 2019-03-06

## 2019-03-06 RX ORDER — SODIUM CHLORIDE 0.9 % (FLUSH) 0.9 %
3-10 SYRINGE (ML) INJECTION AS NEEDED
Status: CANCELLED | OUTPATIENT
Start: 2019-03-06

## 2019-03-06 RX ORDER — ALLOPURINOL 300 MG/1
300 TABLET ORAL DAILY
Status: CANCELLED | OUTPATIENT
Start: 2019-03-06

## 2019-03-06 RX ORDER — FENTANYL CITRATE 50 UG/ML
INJECTION, SOLUTION INTRAMUSCULAR; INTRAVENOUS
Status: DISCONTINUED
Start: 2019-03-06 | End: 2019-03-06 | Stop reason: WASHOUT

## 2019-03-06 RX ORDER — NALOXONE HYDROCHLORIDE 0.4 MG/ML
INJECTION, SOLUTION INTRAMUSCULAR; INTRAVENOUS; SUBCUTANEOUS
Status: DISCONTINUED
Start: 2019-03-06 | End: 2019-03-06 | Stop reason: WASHOUT

## 2019-03-06 RX ORDER — MIDAZOLAM HYDROCHLORIDE 1 MG/ML
INJECTION INTRAMUSCULAR; INTRAVENOUS
Status: COMPLETED | OUTPATIENT
Start: 2019-03-06 | End: 2019-03-06

## 2019-03-06 RX ORDER — PANTOPRAZOLE SODIUM 40 MG/1
40 TABLET, DELAYED RELEASE ORAL EVERY MORNING
Status: CANCELLED | OUTPATIENT
Start: 2019-03-07

## 2019-03-06 RX ORDER — LISINOPRIL 10 MG/1
40 TABLET ORAL DAILY
Status: CANCELLED | OUTPATIENT
Start: 2019-03-06

## 2019-03-06 RX ORDER — SOTALOL HYDROCHLORIDE 120 MG/1
120 TABLET ORAL 2 TIMES DAILY
Qty: 90 TABLET | Refills: 1 | Status: SHIPPED | OUTPATIENT
Start: 2019-03-06 | End: 2019-06-08 | Stop reason: SDUPTHER

## 2019-03-06 RX ORDER — POTASSIUM CHLORIDE 750 MG/1
40 CAPSULE, EXTENDED RELEASE ORAL AS NEEDED
Status: CANCELLED | OUTPATIENT
Start: 2019-03-06

## 2019-03-06 RX ORDER — PROPOFOL 10 MG/ML
100 VIAL (ML) INTRAVENOUS ONCE
Status: DISCONTINUED | OUTPATIENT
Start: 2019-03-06 | End: 2019-03-06

## 2019-03-06 RX ORDER — FINASTERIDE 5 MG/1
5 TABLET, FILM COATED ORAL DAILY
Status: CANCELLED | OUTPATIENT
Start: 2019-03-06

## 2019-03-06 RX ORDER — MIDAZOLAM HYDROCHLORIDE 1 MG/ML
INJECTION INTRAMUSCULAR; INTRAVENOUS
Status: DISCONTINUED
Start: 2019-03-06 | End: 2019-03-06 | Stop reason: HOSPADM

## 2019-03-06 RX ORDER — POTASSIUM CHLORIDE 1.5 G/1.77G
40 POWDER, FOR SOLUTION ORAL AS NEEDED
Status: CANCELLED | OUTPATIENT
Start: 2019-03-06

## 2019-03-06 RX ORDER — HYDROCHLOROTHIAZIDE 12.5 MG/1
12.5 TABLET ORAL DAILY
Status: CANCELLED | OUTPATIENT
Start: 2019-03-06

## 2019-03-06 RX ORDER — SODIUM CHLORIDE 0.9 % (FLUSH) 0.9 %
10 SYRINGE (ML) INJECTION AS NEEDED
Status: DISCONTINUED | OUTPATIENT
Start: 2019-03-06 | End: 2019-03-06 | Stop reason: HOSPADM

## 2019-03-06 RX ORDER — SOTALOL HYDROCHLORIDE 120 MG/1
120 TABLET ORAL EVERY 12 HOURS SCHEDULED
Status: CANCELLED | OUTPATIENT
Start: 2019-03-06

## 2019-03-06 RX ORDER — FLUMAZENIL 0.1 MG/ML
INJECTION INTRAVENOUS
Status: DISCONTINUED
Start: 2019-03-06 | End: 2019-03-06 | Stop reason: WASHOUT

## 2019-03-06 RX ORDER — SODIUM CHLORIDE 0.9 % (FLUSH) 0.9 %
3 SYRINGE (ML) INJECTION EVERY 12 HOURS SCHEDULED
Status: CANCELLED | OUTPATIENT
Start: 2019-03-06

## 2019-03-06 RX ORDER — AMLODIPINE BESYLATE 10 MG/1
10 TABLET ORAL DAILY
Status: CANCELLED | OUTPATIENT
Start: 2019-03-06

## 2019-03-06 RX ORDER — DILTIAZEM HYDROCHLORIDE 5 MG/ML
10 INJECTION INTRAVENOUS ONCE
Status: COMPLETED | OUTPATIENT
Start: 2019-03-06 | End: 2019-03-06

## 2019-03-06 RX ADMIN — PROPOFOL 20 MG: 10 INJECTION, EMULSION INTRAVENOUS at 12:32

## 2019-03-06 RX ADMIN — METHOHEXITAL SODIUM 30 MG: 500 INJECTION, POWDER, LYOPHILIZED, FOR SOLUTION INTRAMUSCULAR; INTRAVENOUS; RECTAL at 14:21

## 2019-03-06 RX ADMIN — PROPOFOL 10 MG: 10 INJECTION, EMULSION INTRAVENOUS at 12:34

## 2019-03-06 RX ADMIN — DILTIAZEM HYDROCHLORIDE 5 MG/HR: 5 INJECTION INTRAVENOUS at 07:57

## 2019-03-06 RX ADMIN — PROPOFOL 40 MG: 10 INJECTION, EMULSION INTRAVENOUS at 12:27

## 2019-03-06 RX ADMIN — MIDAZOLAM HYDROCHLORIDE 2 MG: 1 INJECTION, SOLUTION INTRAMUSCULAR; INTRAVENOUS at 14:21

## 2019-03-06 RX ADMIN — SODIUM CHLORIDE 1000 ML: 9 INJECTION, SOLUTION INTRAVENOUS at 07:56

## 2019-03-06 RX ADMIN — DILTIAZEM HYDROCHLORIDE 10 MG: 5 INJECTION INTRAVENOUS at 08:00

## 2019-03-06 NOTE — H&P
[facility]  Cardiology H & P Note      Chief Complaint/Reason for service:    · Atrial fibrillation         The patient is an 83-year-old gentleman with a history of paroxysmal atrial fibrillation on anticoagulation.  The patient has been experiencing atrial fibrillation for the last 2 days associated with mild palpitation symptoms.  Usually when he has atrial fibrillation he will convert spontaneously to sinus.  However after 2 days he decided he should come into the hospital to be evaluated.  He denies any associated symptoms of shortness of breath, chest pain, orthopnea or TIA or stroke symptoms.    In the emergency room, the patient was found to be in atrial fibrillation with rapid ventricular response.  Cardioversion was attempted in the ER and was unsuccessful.    Past medical, surgical, social and family history reviewed in the patient's electronic medical record.    Review of Systems:   As noted above, otherwise review of systems unremarkable.       Vital Sign Min/Max for last 24 hours  Temp  Min: 98 °F (36.7 °C)  Max: 98 °F (36.7 °C)   BP  Min: 109/94  Max: 161/108   Pulse  Min: 104  Max: 135   Resp  Min: 18  Max: 24   SpO2  Min: 93 %  Max: 97 %   No Data Recorded    No intake or output data in the 24 hours ending 03/06/19 1341        Physical Exam   Constitutional: He is oriented to person, place, and time. He appears well-developed and well-nourished.   HENT:   Head: Normocephalic and atraumatic.   Neck: No thyromegaly present.   Cardiovascular: An irregularly irregular rhythm present. Tachycardia present.   Pulmonary/Chest: Effort normal and breath sounds normal.   Abdominal: Soft.   Neurological: He is alert and oriented to person, place, and time.   Skin: Skin is dry.   Psychiatric: He has a normal mood and affect. His behavior is normal.       Tele: Atrial fibrillation at 115 bpm    Results Review (reviewed the patient's recent labs in the electronic medical  record):     EKG: Atrial fibrillation with a ventricular rate of 124 bpm.  QTC interval 491 ms    CXR: Mildly increased pulmonary vasculature        Results from last 7 days   Lab Units 03/06/19  0709   SODIUM mmol/L 141   POTASSIUM mmol/L 4.3   CHLORIDE mmol/L 106   BUN mg/dL 17   CREATININE mg/dL 0.79   MAGNESIUM mg/dL 1.8         Results from last 7 days   Lab Units 03/06/19  0709   WBC 10*3/mm3 6.87   HEMOGLOBIN g/dL 15.1   HEMATOCRIT % 45.0   PLATELETS 10*3/mm3 184       Lab Results   Component Value Date    HGBA1C 6.40 (H) 12/17/2018       Lab Results   Component Value Date    CHOL 104 12/17/2018    TRIG 162 (H) 12/17/2018    HDL 31 (L) 12/17/2018    LDL 62 12/17/2018    AST 19 03/06/2019    ALT 23 03/06/2019                Active Hospital Problems    Diagnosis Date Noted   • **Paroxysmal atrial fibrillation (CMS/HCC) [I48.0] 08/16/2016     Priority: High     · Diagnosed August 2012.   · FARHAD-guided ECV (08/17/2012).  · CHADS-VASc 5 (age > 75, CAD, HTN, DM)  · Successful external cardioversion for asymptomatic atrial fibrillation with RVR, 10/25/18  · Unsuccessful cardioversion for atrial fibrillation RVR, 3/6/19     • Essential hypertension [I10] 06/20/2016     83-year-old gentleman with mildly symptomatic atrial fibrillation with rapid ventricular response with unsuccessful cardioversion in the ER.  We will attempt repeat cardioversion in the CVOU using different pads and pad placement.  If unsuccessful, patient will be admitted for rate control and up titration of medical therapy         · Reattempt cardioversion in the CVOU   · Place anterior pad on the right upper sternal border and posterior pad on the left mid subscapular area.    Titus Oliveros IV, MD  3/6/2019

## 2019-03-06 NOTE — DISCHARGE INSTRUCTIONS
1.) YOU MAY CONTACT THE A-FIB CLINIC @ [phone] (AFIB).  2.) YOU NEED TO HAVE AN EKG ON Friday MARCH 8, 2019

## 2019-03-06 NOTE — DISCHARGE SUMMARY
Patient Name: Darien Camarena  : 1935  MRN: [id number]    Date of Admission: 3/6/2019  Date of Discharge:  3/6/2019    Discharge Diagnosis:   Active Hospital Problems    Diagnosis   • **Paroxysmal atrial fibrillation (CMS/HCC)     · Diagnosed 2012.   · FARHAD-guided ECV (2012).  · CHADS-VASc 5 (age > 75, CAD, HTN, DM)  · Successful external cardioversion for asymptomatic atrial fibrillation with RVR, 10/25/18  · Successful cardioversion for atrial fibrillation RVR, 3/6/19     • Essential hypertension         History of Present Illness/Hospital Course  Patient is a 83 y.o. male with a history of paroxysmal atrial fibrillation presented to the [facility] emergency room with mildly symptomatic atrial fibrillation with rapid ventricular response.  The patient is chronically anticoagulated with Eliquis and cardioversion was attempted in the ER with no success.  The patient was brought up to the CVOU and anterior pad was placed over the right sternal border and posterior pad placed in the mid left subscapular area.  Cardioversion was successful on one attempt at 200 J.    The patient was discharged home the same day.  His sotalol dosing will be increased from 80 mg twice daily to 120 mg twice daily.  He will need to come into the office for an EKG on Friday, 3/8/19. The patient inquires about the option of radiofrequency ablation which I think may be reasonable for him.  I will have him see 1 of my EP partners as an outpatient.    If patient has recurrence of atrial fibrillation, I have asked that he contact either my office or the A. fib clinic ([phone]) and outpatient external cardioversion can be arranged without requiring ER visit.    Procedures Performed  External cardioversion         Pertinent Test Results:  Results from last 7 days   Lab Units 19  0709   WBC 10*3/mm3 6.87   HEMOGLOBIN g/dL 15.1   HEMATOCRIT % 45.0   PLATELETS 10*3/mm3 184   SODIUM mmol/L 141   POTASSIUM mmol/L  4.3   CHLORIDE mmol/L 106   CO2 mmol/L 26.0   BUN mg/dL 17   CREATININE mg/dL 0.79   GLUCOSE mg/dL 169*   CALCIUM mg/dL 9.0     Results from last 7 days   Lab Units 03/06/19  0709   BILIRUBIN mg/dL 1.0   ALK PHOS U/L 75   ALT (SGPT) U/L 23   AST (SGOT) U/L 19          Invalid input(s): TG  Results from last 7 days   Lab Units 03/06/19  0709   TSH mIU/mL 2.668   BNP pg/mL 207.0*                Physical Exam at Discharge    Vital Signs  Temp:  [96.9 °F (36.1 °C)-98 °F (36.7 °C)] 96.9 °F (36.1 °C)  Heart Rate:  [104-135] 116  Resp:  [16-24] 16  BP: (109-161)/() 140/96    Physical Exam   Constitutional: He is oriented to person, place, and time. He appears well-developed and well-nourished.   HENT:   Head: Normocephalic and atraumatic.   Cardiovascular: Normal rate and regular rhythm.   No murmur heard.  Pulmonary/Chest: Effort normal and breath sounds normal.   Abdominal: Soft.   Neurological: He is alert and oriented to person, place, and time.        Discharge Disposition: Home  Home or Self Care    Discharge Medications     Discharge Medications      New Medications      Instructions Start Date   sotalol 120 MG tablet  Commonly known as:  BETAPACE   120 mg, Oral, 2 Times Daily         Continue These Medications      Instructions Start Date   allopurinol 300 MG tablet  Commonly known as:  ZYLOPRIM   TAKE 1 TABLET BY MOUTH ONCE DAILY      amLODIPine 10 MG tablet  Commonly known as:  NORVASC   TAKE 1 TABLET BY MOUTH ONCE DAILY      apixaban 5 MG tablet tablet  Commonly known as:  ELIQUIS   5 mg, Oral, Every 12 Hours      docusate sodium 100 MG capsule  Commonly known as:  COLACE   300 mg, Oral, Nightly      finasteride 5 MG tablet  Commonly known as:  PROSCAR   TAKE 1 TABLET BY MOUTH AT NIGHT      hydrochlorothiazide 12.5 MG capsule  Commonly known as:  MICROZIDE   TAKE 1 CAPSULE BY MOUTH ONCE DAILY IN THE MORNING      Iron 240 (27 Fe) MG tablet   1 tablet, Oral, Daily      lisinopril 40 MG tablet  Commonly known  as:  PRINIVIL,ZESTRIL   TAKE ONE TABLET BY MOUTH ONCE DAILY      metFORMIN 1000 MG tablet  Commonly known as:  GLUCOPHAGE   TAKE 1 TABLET BY MOUTH TWICE DAILY      methenamine 1 g tablet  Commonly known as:  HIPREX   TAKE 1/2 (ONE-HALF) TABLET BY MOUTH TWICE DAILY WITH MEALS      omeprazole 20 MG capsule  Commonly known as:  priLOSEC   TAKE 1 CAPSULE BY MOUTH ONCE DAILY      pravastatin 40 MG tablet  Commonly known as:  PRAVACHOL   TAKE ONE TABLET BY MOUTH ONCE DAILY      PROBIOTIC ADVANCED PO   1 tablet, Oral, 2 Times Daily      tamsulosin 0.4 MG capsule 24 hr capsule  Commonly known as:  FLOMAX   TAKE 1 CAPSULE BY MOUTH ONCE DAILY      Vitamin C 500 MG capsule   1 tablet, Oral, 4 Times Daily         Stop These Medications    sotalol 80 MG tablet tablet  Commonly known as:  BETAPACE AF            Discharge Diet: AHA/ADA    Activity at Discharge: Ad kailyn    Follow-up Appointments  Future Appointments   Date Time Provider Department Center   4/17/2019  3:45 PM Pito Way MD [facility] None   8/27/2019  3:00 PM Blanquita Ansari APRN [facility] None     Additional Instructions for the Follow-ups that You Need to Schedule     Discharge Follow-up with Specialty: Taurus Oliveros; 1 Month   As directed      Specialty:  Taurus Oliveros    Follow Up:  1 Month    Follow Up Details:  Hospital FU for atrial fibrillation                 Electronically signed by Titus Oliveros IV, MD03/06/19, 2:51 PM    Time: Discharge 25 min

## 2019-03-06 NOTE — ED PROVIDER NOTES
Subjective   History of Present Illness    Review of Systems    Past Medical History:   Diagnosis Date   • Atrial fibrillation (CMS/HCC)    • Chronic kidney disease    • Diabetes mellitus (CMS/HCC)    • Gout    • History of colonoscopy 2012   • Hyperlipidemia    • Hypertension    • PAF (paroxysmal atrial fibrillation) (CMS/HCC)    • Prostatism    • Sleep apnea     CPAP HS       Allergies   Allergen Reactions   • Penicillins Hives   • Xarelto [Rivaroxaban]      GI bleed       Past Surgical History:   Procedure Laterality Date   • CARDIOVERSION     • CATARACT EXTRACTION Left    • KIDNEY STONE SURGERY         Family History   Problem Relation Age of Onset   • Alzheimer's disease Mother    • Cancer Father    • COPD Father        Social History     Socioeconomic History   • Marital status:      Spouse name: Not on file   • Number of children: Not on file   • Years of education: Not on file   • Highest education level: Not on file   Tobacco Use   • Smoking status: Former Smoker     Last attempt to quit: 1960     Years since quittin.8   • Smokeless tobacco: Never Used   • Tobacco comment: started at 15 yo.   Substance and Sexual Activity   • Alcohol use: No   • Drug use: No   • Sexual activity: Defer         Objective   Physical Exam    Electrical Cardioversion  Date/Time: 3/6/2019 12:54 PM  Performed by: Alexx Araujo MD  Authorized by: Alexx Araujo MD     Consent:     Consent obtained:  Verbal    Consent given by:  Patient    Risks discussed:  Death, induced arrhythmia and pain    Alternatives discussed:  No treatment and observation  Pre-procedure details:     Cardioversion basis:  Emergent    Rhythm:  Atrial fibrillation    Electrode placement:  Anterior-posterior  Attempt one:     Cardioversion mode:  Synchronous    Waveform:  Biphasic    Shock (Joules):  200    Shock outcome:  Conversion to normal sinus rhythm (Shortly after, converted back into atrial fibrillation)  Attempt two:      Cardioversion mode:  Synchronous    Waveform:  Biphasic    Shock (Joules):  200    Shock outcome:  Conversion to normal sinus rhythm (Shortly after, converted back to atrial fibrillation)  Post-procedure details:     Patient status:  Awake    Patient tolerance of procedure:  Tolerated well, no immediate complications             ED Course  ED Course as of Mar 07 1650   Wed Mar 06, 2019   0830 [employer] Cards paged via [employer]  [JM]   7229 Spoke with Dr. Oliveros and advised of the length of time patient has been in A. fib at least 2 days and probably longer per the patient.  Dr. Low advised since the patient is on Eliquis there is no timeframe and he can be cardioverted today.  [JM]   0909 Dr. Araujo aware and will cardiovert.  [JM]   1233 Please see procedure note for details on cardioversion and ablation.  I also discussed the case personally with Dr. Taurus Oliveros MD.  Please note that the patient was sedated with propofol excessively twice.  He was cardioverted in synchronized fashion with 200 J of energy on 2 occasions with what appeared to be initial conversion to normal sinus rhythm on both occasions.  That being said, within less than 1 minute after each electrical cardioversion, the patient once again was found to be in atrial fibrillation with rapid ventricular rate.  Currently as of the time of this dictation his blood pressure is 140/102 with oxygen saturations of 96% and a heart rate of 110.  Heart rate is irregularly irregular.  The patient is following commands and feeling well but is still in A. fib.  I believe he would need to be admitted to cardiology for further care.  Dr. Oliveros is in agreement and will be admitting the patient.  Impression and plan as noted by Anthony Gardner.  [CESAR]      ED Course User Index  [CESAR] Alexx Araujo MD  [JM] Anthony aGrdner, BERYL                     [facility]    Final diagnoses:   Atrial fibrillation, unspecified type (CMS/[facility])       Documentation assistance provided by  scottie Blanco.  Information recorded by the scribe was done at my direction and has been verified and validated by me.     Bipin Blanco  03/06/19 2555       Alexx Araujo MD  03/07/19 8106

## 2019-03-06 NOTE — ED PROVIDER NOTES
Subjective   Patient reports with rapid irregular palpitations and a history of proximal A. fib.  He tells me he is been in A. fib for 2-3 days but honestly probably longer.  He will periodically check his heart rate.  He is not have any chest pain or trouble breathing he takes sotalol and Eliquis.  Both of which she had this morning he essentially feels okay there is no dizziness.  He is followed by Dr. Oliveros.  His heart rate is in the 140s 130s on arrival.  He drove himself here        Palpitations   Palpitations quality:  Irregular  Duration:  3 days  Timing:  Constant  Progression:  Unchanged  Chronicity:  Recurrent  Relieved by:  None tried  Worsened by:  Nothing  Ineffective treatments:  Beta blockers  Associated symptoms: no chest pain, no cough, no diaphoresis, no dizziness, no nausea, no shortness of breath and no vomiting    Risk factors: hx of atrial fibrillation        Review of Systems   Constitutional: Negative for chills, diaphoresis and fever.   HENT: Negative for congestion and sore throat.    Respiratory: Negative for cough, choking, chest tightness, shortness of breath and wheezing.    Cardiovascular: Positive for palpitations. Negative for chest pain and leg swelling.   Gastrointestinal: Negative for abdominal distention, abdominal pain, anal bleeding, blood in stool, constipation, diarrhea, nausea and vomiting.   Genitourinary: Negative for difficulty urinating, dysuria, flank pain, frequency, hematuria and urgency.   Neurological: Negative for dizziness.   All other systems reviewed and are negative.      Past Medical History:   Diagnosis Date   • Atrial fibrillation (CMS/HCC)    • Chronic kidney disease    • Diabetes mellitus (CMS/HCC)    • Gout    • History of colonoscopy 09/12/2012   • Hyperlipidemia    • Hypertension    • PAF (paroxysmal atrial fibrillation) (CMS/HCC)    • Prostatism    • Sleep apnea     CPAP HS       Allergies   Allergen Reactions   • Penicillins Hives   • Xarelto  [Rivaroxaban]      GI bleed       Past Surgical History:   Procedure Laterality Date   • CARDIOVERSION     • CATARACT EXTRACTION Left    • KIDNEY STONE SURGERY         Family History   Problem Relation Age of Onset   • Alzheimer's disease Mother    • Cancer Father    • COPD Father        Social History     Socioeconomic History   • Marital status:      Spouse name: Not on file   • Number of children: Not on file   • Years of education: Not on file   • Highest education level: Not on file   Tobacco Use   • Smoking status: Former Smoker     Last attempt to quit: 1960     Years since quittin.8   • Smokeless tobacco: Never Used   • Tobacco comment: started at 13 yo.   Substance and Sexual Activity   • Alcohol use: No   • Drug use: No   • Sexual activity: Defer           Objective   Physical Exam   Constitutional: He is oriented to person, place, and time. He appears well-developed and well-nourished.   HENT:   Head: Normocephalic and atraumatic.   Right Ear: External ear normal.   Left Ear: External ear normal.   Nose: Nose normal.   Mouth/Throat: Oropharynx is clear and moist.   Eyes: Conjunctivae and EOM are normal. Pupils are equal, round, and reactive to light.   Neck: Normal range of motion. Neck supple.   Cardiovascular: Normal rate, normal heart sounds and intact distal pulses. An irregularly irregular rhythm present.   Pulmonary/Chest: Effort normal and breath sounds normal.   Abdominal: Soft. Bowel sounds are normal.   Musculoskeletal: Normal range of motion.   Neurological: He is alert and oriented to person, place, and time.   Skin: Skin is warm and dry.   Psychiatric: He has a normal mood and affect. His behavior is normal. Judgment normal.       Electrical Cardioversion  Date/Time: 3/6/2019 12:40 PM  Performed by: Alexx Araujo MD  Authorized by: Alexx Araujo MD     Consent:     Consent obtained:  Verbal    Consent given by:  Patient    Risks discussed:  Death, pain and induced  arrhythmia    Alternatives discussed:  No treatment and observation  Pre-procedure details:     Cardioversion basis:  Emergent    Rhythm:  Atrial fibrillation  Attempt one:     Cardioversion mode:  Synchronous    Chemical Cardioversion  Date/Time: 3/6/2019 12:44 PM  Performed by: Anthony Gardner APRN  Authorized by: Alexx Araujo MD     Consent:     Consent obtained:  Verbal    Consent given by:  Patient    Risks discussed:  Induced arrhythmia    Alternatives discussed:  Electrical cardioversion  Pre-procedure details:     Cardioversion basis:  Urgent    Rhythm:  Atrial fibrillation  Attempt one:    : diltiazem.      Medication outcome:  No change in rhythm  Post-procedure details:     Patient status:  Awake    Patient tolerance of procedure:  Tolerated well, no immediate complications               ED Course  ED Course as of Mar 06 1249   Wed Mar 06, 2019   0830 [employer] Cards paged via [employer]  [JM]   0895 Spoke with Dr. Oliveros and advised of the length of time patient has been in A. fib at least 2 days and probably longer per the patient.  Dr. Low advised since the patient is on Eliquis there is no timeframe and he can be cardioverted today.  [JM]   0909 Dr. Araujo aware and will cardiovert.  [JM]   1239 Please see procedure note for details on cardioversion and ablation.  I also discussed the case personally with Dr. Taurus Oliveros MD.  Please note that the patient was sedated with propofol excessively twice.  He was cardioverted in synchronized fashion with 200 J of energy on 2 occasions with what appeared to be initial conversion to normal sinus rhythm on both occasions.  That being said, within less than 1 minute after each electrical cardioversion, the patient once again was found to be in atrial fibrillation with rapid ventricular rate.  Currently as of the time of this dictation his blood pressure is 140/102 with oxygen saturations of 96% and a heart rate of 110.  Heart rate is irregularly irregular.   The patient is following commands and feeling well but is still in A. fib.  I believe he would need to be admitted to cardiology for further care.  Dr. Oliveros is in agreement and will be admitting the patient.  Impression and plan as noted by Anthony Gardner.  [CESAR]      ED Course User Index  [CESAR] Alexx Araujo MD  [JM] Anthony Gardner APRN          XR Chest 1 View   Preliminary Result   Mild pulmonary vascular congestion without overt congestive   failure.       D:  03/06/2019   E:  03/06/2019                    [facility]      Final diagnoses:   Atrial fibrillation, unspecified type (CMS/HCC)            Bipin Blanco  03/06/19 1243       Anthony Gardner APRN  03/06/19 1246       Anthony Gardner APRN  03/06/19 1249

## 2019-03-11 ENCOUNTER — CLINICAL SUPPORT (OUTPATIENT)
Dept: CARDIOLOGY | Facility: CLINIC | Age: 84
End: 2019-03-11

## 2019-03-11 DIAGNOSIS — I48.0 PAROXYSMAL ATRIAL FIBRILLATION (HCC): Primary | ICD-10-CM

## 2019-03-11 PROCEDURE — 93000 ELECTROCARDIOGRAM COMPLETE: CPT | Performed by: INTERNAL MEDICINE

## 2019-03-11 NOTE — PROGRESS NOTES
EKG VISIT ONLY, GAVE EKG TO ROGER AND PATIENT WAS TOLD COULD LEAVE AND WOULD NOTIFY IF CHANGES WERE MADE.

## 2019-03-12 ENCOUNTER — TELEPHONE (OUTPATIENT)
Dept: CARDIOLOGY | Facility: CLINIC | Age: 84
End: 2019-03-12

## 2019-03-12 NOTE — TELEPHONE ENCOUNTER
EKG reviewed by [facility]. Patient reports he is feeling great. No changes. Patient verbalized understanding.

## 2019-03-30 DIAGNOSIS — I10 ESSENTIAL HYPERTENSION: Chronic | ICD-10-CM

## 2019-04-01 RX ORDER — AMLODIPINE BESYLATE 10 MG/1
TABLET ORAL
Qty: 90 TABLET | Refills: 0 | Status: SHIPPED | OUTPATIENT
Start: 2019-04-01 | End: 2019-06-22 | Stop reason: SDUPTHER

## 2019-04-01 RX ORDER — HYDROCHLOROTHIAZIDE 12.5 MG/1
CAPSULE, GELATIN COATED ORAL
Qty: 90 CAPSULE | Refills: 0 | Status: SHIPPED | OUTPATIENT
Start: 2019-04-01 | End: 2019-06-22 | Stop reason: SDUPTHER

## 2019-04-04 NOTE — PROGRESS NOTES
Encounter Date:04/09/2019    Patient ID: Darien Camarena is a 83 y.o. male who resides in [place], [place]    CC/Reason for visit:  Paroxysmal atrial fibrillation (CMS/HCC)           Problem List Items Addressed This Visit        Cardiovascular and Mediastinum    Essential hypertension - Primary (Chronic)    Current Assessment & Plan     · Continue amlodipine 10 mg daily  · Continue HCTZ 12.5 mg daily  · Continue Lisinopril 40 mg daily         Persistent atrial fibrillation (CMS/HCC)    Overview     · Diagnosed August 2012.   · FARHAD-guided ECV (08/17/2012).  · CHADS-VASc 5 (age > 75, CAD, HTN, DM)  · Successful external cardioversion for asymptomatic atrial fibrillation with RVR, 10/25/18  · Successful cardioversion for atrial fibrillation RVR, 3/6/19.  Sotalol increased  · Clinic visit 4/9/2019 maintaining NSR         Current Assessment & Plan     · Continue Eliquis 5 mg BID  · Continue Sotalol 120 mg BID         Hyperlipidemia LDL goal <100    Overview     · Moderate to high intensity statin therapy indicated given the presence diabetes         Current Assessment & Plan     · Continue Pravastatin 40 mg daily             Patient is doing well from a cardiovascular standpoint.  He has no signs or symptoms of angina or heart failure.  He is maintaining normal sinus rhythm on the higher dose of sotalol.  EKG today shows acceptable QT interval.  We will continue his current medications.  He will follow-up with us in 6 months or sooner if needed.  If he was to have recurrence of atrial fibrillation he will need to see EP.       · Continue current medications  · Follow-up 6 months or sooner if needed  · If no further recurrences of A. fib patient will cancel appointment with EP        Darien Camarena returns today for follow-up of his persistent atrial fibrillation and cardiac risk factors.  In October the patient was found to be in atrial fibrillation with RVR and an external cardioversion was scheduled which  "successfully restored normal sinus rhythm.  When he was reevaluated in February of this year the patient had been maintaining normal sinus rhythm.  Unfortunately last month patient presented to [facility] once again being out of rhythm with EKG confirming atrial fibrillation with RVR.  An external cardioversion was attempted in the emergency room but was unsuccessful so he was admitted to the cardiovascular observation unit.  Cardioversion was reattempted by cardiology which did restore normal sinus rhythm.  He was discharged home later that day on a higher dose of Sotalol.  The patient has always been anticoagulated with Eliquis.  He is tolerating without reports of active overt bleeding.  Since his last cardioversion he is maintaining normal sinus rhythm.  Our office did contact him and made him an appointment with Dr. Contreras to discuss possible ablations.  The patient is going to keep the appointment for the next month but if he is having no further issues he plans on canceling it.  He denies chest pain, dyspnea, orthopnea, palpitations or syncope.  Patient reports he feels \"great\" he is taking daily statin therapy and tolerating without reports of myalgias.  Last lipid panel results and December were excellent.    Review of Systems   Constitution: Negative for weakness and malaise/fatigue.   Eyes: Negative for vision loss in left eye and vision loss in right eye.   Cardiovascular: Negative for chest pain, dyspnea on exertion, near-syncope, orthopnea, palpitations, paroxysmal nocturnal dyspnea and syncope.   Hematologic/Lymphatic: Bruises/bleeds easily.   Musculoskeletal: Negative for myalgias.   Neurological: Negative for brief paralysis, excessive daytime sleepiness, focal weakness, numbness and paresthesias.   All other systems reviewed and are negative.      The patient's past medical, social, family history and ROS reviewed in the patient's electronic medical record.    Allergies  Penicillins " "and Xarelto [rivaroxaban]    Outpatient Medications Marked as Taking for the 4/9/19 encounter (Office Visit) with Blanquita Ansari APRN   Medication Sig Dispense Refill   • allopurinol (ZYLOPRIM) 300 MG tablet TAKE 1 TABLET BY MOUTH ONCE DAILY 90 tablet 1   • amLODIPine (NORVASC) 10 MG tablet TAKE 1 TABLET BY MOUTH ONCE DAILY 90 tablet 0   • apixaban (ELIQUIS) 5 MG tablet tablet Take 1 tablet by mouth Every 12 (Twelve) Hours. 180 tablet 3   • Ascorbic Acid (VITAMIN C) 500 MG capsule Take 1 tablet by mouth 4 (four) times a day.     • docusate sodium (COLACE) 100 MG capsule Take 300 mg by mouth every night.     • Ferrous Gluconate (IRON) 240 (27 FE) MG tablet Take 1 tablet by mouth daily.     • finasteride (PROSCAR) 5 MG tablet TAKE 1 TABLET BY MOUTH AT NIGHT 90 tablet 1   • hydrochlorothiazide (MICROZIDE) 12.5 MG capsule TAKE 1 CAPSULE BY MOUTH ONCE DAILY IN THE MORNING 90 capsule 0   • lisinopril (PRINIVIL,ZESTRIL) 40 MG tablet TAKE ONE TABLET BY MOUTH ONCE DAILY 90 tablet 3   • metFORMIN (GLUCOPHAGE) 1000 MG tablet TAKE 1 TABLET BY MOUTH TWICE DAILY 180 tablet 1   • methenamine (HIPREX) 1 g tablet TAKE 1/2 (ONE-HALF) TABLET BY MOUTH TWICE DAILY WITH MEALS 90 tablet 1   • omeprazole (priLOSEC) 20 MG capsule TAKE 1 CAPSULE BY MOUTH ONCE DAILY 90 capsule 1   • pravastatin (PRAVACHOL) 40 MG tablet TAKE ONE TABLET BY MOUTH ONCE DAILY 90 tablet 1   • Probiotic Product (PROBIOTIC ADVANCED PO) Take 1 tablet by mouth 2 (Two) Times a Day.     • sotalol (BETAPACE) 120 MG tablet Take 1 tablet by mouth 2 (Two) Times a Day. 90 tablet 1   • tamsulosin (FLOMAX) 0.4 MG capsule 24 hr capsule TAKE 1 CAPSULE BY MOUTH ONCE DAILY 90 capsule 1           Blood pressure 146/70, pulse 66, height 190.5 cm (75\"), weight 119 kg (262 lb).  Body mass index is 32.75 kg/m².  There were no vitals filed for this visit.    Physical Exam   Constitutional: He is oriented to person, place, and time. He appears well-developed and well-nourished. "   HENT:   Head: Normocephalic and atraumatic.   Eyes: Pupils are equal, round, and reactive to light. No scleral icterus.   Neck: No JVD present. Carotid bruit is not present. No thyromegaly present.   Cardiovascular: Normal rate and regular rhythm. Exam reveals no gallop.   No murmur heard.  Pulmonary/Chest: Effort normal and breath sounds normal.   Abdominal: Soft. He exhibits no distension. There is no hepatosplenomegaly.   Musculoskeletal: He exhibits no edema.   Neurological: He is alert and oriented to person, place, and time.   Skin: Skin is warm and dry.   Psychiatric: He has a normal mood and affect. His behavior is normal.       Data Review:       ECG 12 Lead  Date/Time: 4/9/2019 3:45 PM  Performed by: Blanquita Ansari APRN  Authorized by: Blanquita Ansari APRN   Comparison: compared with previous ECG   Rhythm: sinus rhythm  BPM: 66    Clinical impression: normal ECG  Comments: Normal sinus rhythm  NH interval 164 MS  QRS duration 84 MS  QT/QTc 4 2/421 MS            Lab Results   Component Value Date    CHOL 104 12/17/2018    TRIG 162 (H) 12/17/2018    HDL 31 (L) 12/17/2018    LDL 62 12/17/2018    AST 19 03/06/2019    ALT 23 03/06/2019       Lab Results   Component Value Date    HGBA1C 6.40 (H) 12/17/2018           BERYL Buitrago  4/9/2019

## 2019-04-09 ENCOUNTER — OFFICE VISIT (OUTPATIENT)
Dept: CARDIOLOGY | Facility: CLINIC | Age: 84
End: 2019-04-09

## 2019-04-09 VITALS
BODY MASS INDEX: 32.58 KG/M2 | HEIGHT: 75 IN | HEART RATE: 66 BPM | SYSTOLIC BLOOD PRESSURE: 146 MMHG | WEIGHT: 262 LBS | DIASTOLIC BLOOD PRESSURE: 70 MMHG

## 2019-04-09 DIAGNOSIS — I48.19 PERSISTENT ATRIAL FIBRILLATION (HCC): Primary | ICD-10-CM

## 2019-04-09 DIAGNOSIS — E78.5 HYPERLIPIDEMIA LDL GOAL <100: ICD-10-CM

## 2019-04-09 DIAGNOSIS — I10 ESSENTIAL HYPERTENSION: Chronic | ICD-10-CM

## 2019-04-09 PROCEDURE — 99214 OFFICE O/P EST MOD 30 MIN: CPT | Performed by: NURSE PRACTITIONER

## 2019-04-09 PROCEDURE — 93000 ELECTROCARDIOGRAM COMPLETE: CPT | Performed by: NURSE PRACTITIONER

## 2019-04-09 NOTE — ASSESSMENT & PLAN NOTE
· Continue amlodipine 10 mg daily  · Continue HCTZ 12.5 mg daily  · Continue Lisinopril 40 mg daily

## 2019-04-15 RX ORDER — ALLOPURINOL 300 MG/1
TABLET ORAL
Qty: 90 TABLET | Refills: 1 | Status: SHIPPED | OUTPATIENT
Start: 2019-04-15 | End: 2019-10-12 | Stop reason: SDUPTHER

## 2019-04-17 ENCOUNTER — OFFICE VISIT (OUTPATIENT)
Dept: INTERNAL MEDICINE | Facility: CLINIC | Age: 84
End: 2019-04-17

## 2019-04-17 VITALS
WEIGHT: 264 LBS | SYSTOLIC BLOOD PRESSURE: 140 MMHG | TEMPERATURE: 98.2 F | DIASTOLIC BLOOD PRESSURE: 68 MMHG | RESPIRATION RATE: 18 BRPM | BODY MASS INDEX: 33 KG/M2 | HEART RATE: 64 BPM

## 2019-04-17 DIAGNOSIS — E78.5 HYPERLIPIDEMIA, UNSPECIFIED HYPERLIPIDEMIA TYPE: ICD-10-CM

## 2019-04-17 DIAGNOSIS — L57.0 ACTINIC KERATOSIS: ICD-10-CM

## 2019-04-17 DIAGNOSIS — E11.9 TYPE 2 DIABETES MELLITUS WITHOUT COMPLICATION, WITHOUT LONG-TERM CURRENT USE OF INSULIN (HCC): ICD-10-CM

## 2019-04-17 DIAGNOSIS — I10 ESSENTIAL HYPERTENSION: ICD-10-CM

## 2019-04-17 DIAGNOSIS — N39.0 URINARY TRACT INFECTION WITH HEMATURIA, SITE UNSPECIFIED: Primary | ICD-10-CM

## 2019-04-17 DIAGNOSIS — R31.9 URINARY TRACT INFECTION WITH HEMATURIA, SITE UNSPECIFIED: Primary | ICD-10-CM

## 2019-04-17 DIAGNOSIS — K21.00 GASTROESOPHAGEAL REFLUX DISEASE WITH ESOPHAGITIS: ICD-10-CM

## 2019-04-17 LAB
A/C: NORMAL
ALBUMIN SERPL-MCNC: 3.8 G/DL (ref 3.5–5.2)
ALBUMIN/GLOB SERPL: 1.2 G/DL
ALP SERPL-CCNC: 77 U/L (ref 39–117)
ALT SERPL W P-5'-P-CCNC: 24 U/L (ref 1–41)
ANION GAP SERPL CALCULATED.3IONS-SCNC: 12.8 MMOL/L
AST SERPL-CCNC: 19 U/L (ref 1–40)
BACTERIA UR QL AUTO: ABNORMAL /HPF
BILIRUB BLD-MCNC: NEGATIVE MG/DL
BILIRUB SERPL-MCNC: 0.7 MG/DL (ref 0.2–1.2)
BUN BLD-MCNC: 19 MG/DL (ref 8–23)
BUN/CREAT SERPL: 24.1 (ref 7–25)
CALCIUM SPEC-SCNC: 8.9 MG/DL (ref 8.6–10.5)
CHLORIDE SERPL-SCNC: 101 MMOL/L (ref 98–107)
CHOLEST SERPL-MCNC: 108 MG/DL (ref 0–200)
CLARITY, POC: CLEAR
CO2 SERPL-SCNC: 27.2 MMOL/L (ref 22–29)
COLOR UR: YELLOW
CREAT BLD-MCNC: 0.79 MG/DL (ref 0.76–1.27)
EXPIRATION DATE: ABNORMAL
EXPIRATION DATE: NORMAL
GFR SERPL CREATININE-BSD FRML MDRD: 94 ML/MIN/1.73
GLOBULIN UR ELPH-MCNC: 3.3 GM/DL
GLUCOSE BLD-MCNC: 172 MG/DL (ref 65–99)
GLUCOSE UR STRIP-MCNC: NEGATIVE MG/DL
HBA1C MFR BLD: 6.3 % (ref 4.8–5.6)
HDLC SERPL-MCNC: 28 MG/DL (ref 40–60)
HYALINE CASTS UR QL AUTO: ABNORMAL /LPF
KETONES UR QL: NEGATIVE
LDLC SERPL CALC-MCNC: 29 MG/DL (ref 0–100)
LDLC/HDLC SERPL: 1.05 {RATIO}
LEUKOCYTE EST, POC: ABNORMAL
Lab: ABNORMAL
Lab: NORMAL
NITRITE UR-MCNC: POSITIVE MG/ML
PH UR: 5 [PH] (ref 5–8)
POC CREATININE URINE: NORMAL
POC MICROALBUMIN URINE: NORMAL
POTASSIUM BLD-SCNC: 3.8 MMOL/L (ref 3.5–5.2)
PROT SERPL-MCNC: 7.1 G/DL (ref 6–8.5)
PROT UR STRIP-MCNC: ABNORMAL MG/DL
RBC # UR STRIP: ABNORMAL /UL
RBC # UR: ABNORMAL /HPF
REF LAB TEST METHOD: ABNORMAL
SODIUM BLD-SCNC: 141 MMOL/L (ref 136–145)
SP GR UR: 1.02 (ref 1–1.03)
SQUAMOUS #/AREA URNS HPF: ABNORMAL /HPF
TRIGL SERPL-MCNC: 253 MG/DL (ref 0–150)
UROBILINOGEN UR QL: NORMAL
VLDLC SERPL-MCNC: 50.6 MG/DL (ref 5–40)
WBC UR QL AUTO: ABNORMAL /HPF

## 2019-04-17 PROCEDURE — 81003 URINALYSIS AUTO W/O SCOPE: CPT | Performed by: INTERNAL MEDICINE

## 2019-04-17 PROCEDURE — 87086 URINE CULTURE/COLONY COUNT: CPT | Performed by: INTERNAL MEDICINE

## 2019-04-17 PROCEDURE — 80053 COMPREHEN METABOLIC PANEL: CPT | Performed by: INTERNAL MEDICINE

## 2019-04-17 PROCEDURE — 17000 DESTRUCT PREMALG LESION: CPT | Performed by: INTERNAL MEDICINE

## 2019-04-17 PROCEDURE — 99214 OFFICE O/P EST MOD 30 MIN: CPT | Performed by: INTERNAL MEDICINE

## 2019-04-17 PROCEDURE — 83036 HEMOGLOBIN GLYCOSYLATED A1C: CPT | Performed by: INTERNAL MEDICINE

## 2019-04-17 PROCEDURE — 36415 COLL VENOUS BLD VENIPUNCTURE: CPT | Performed by: INTERNAL MEDICINE

## 2019-04-17 PROCEDURE — 80061 LIPID PANEL: CPT | Performed by: INTERNAL MEDICINE

## 2019-04-17 PROCEDURE — 81015 MICROSCOPIC EXAM OF URINE: CPT | Performed by: INTERNAL MEDICINE

## 2019-04-17 PROCEDURE — 82044 UR ALBUMIN SEMIQUANTITATIVE: CPT | Performed by: INTERNAL MEDICINE

## 2019-04-17 NOTE — PROGRESS NOTES
Chief Complaint   Patient presents with   • Follow-up     4 month follow up chronic medical problems       History of Present Illness    He presents for an initial evaluation with a scaly area on the mid-upper back. This has been present for longer than one year. The condition is non-painful, enlarging and irritated. There are no exposures to people with similar lesions. There is no history of new cosmetic or detergent usage on the affected area. No prior treatment has been administered.    The patient presents for a follow-up related to Type 2 Diabetes Mellitus and reports that he doesn't check his blood sugars at home. He denies hypoglycemic symptoms. The patient denies polyuria, polydypsia or polyphagia. He reports no symptoms of neuropathy. The patient takes his medication as prescribed. He is not taking insulin. The patient does check his feet daily at home. He denies chest pain, shortness of breath, orthopnea, paroxysmal nocturnal dyspnea, dyspnea on exertion, edema, palpitations or syncope.    The patient presents for a follow-up related to hyperlipidemia. He is following a low fat diet. He reports that he is exercising. He is taking pravastatin. The patient is taking his medication as prescribed. He reports no medication side effects, including muscle cramps, abdominal pain, headaches or weakness.    The patient presents for a follow-up related to hypertension. The patient reports that he has had no headaches or blurred vision. He states that he is taking his medication as prescribed. He is not having medication side effects.    The patient presents for a follow-up related to GERD. The patient is on omeprazole for his gastroesophageal reflux. The medication is taken on a regular basis and gives complete relief of the symptoms. He reports no abdominal pain, belching, diarrhea, dysphagia, early satiety, heartburn, hoarseness, nausea, odynophagia, rectal bleeding, vomiting or weight loss. The GERD has no known  aggravating factors.    Review of Systems    CONSTITUTIONAL- Denies Fever, Chills, Sweats, Fatigue, Weakness or Malaise.    HENT- Denies Nasal Discharge, Sore Throat, Ear Pain, Ear Drainage, Sinus Pain, Nasal Congestion, Decreased Hearing or Tinnitus.    GASTROINTESTINAL- Denies Constipation.    PULMONARY- Denies Wheezing, Sputum Production, Cough, Hemoptysis or Pleuritic Chest Pain.    CARDIOVASCULAR- Denies Claudication or Irregular Heart Beat.    ENDOCRINE- Denies Cold Intolerance, Depression, Hair Loss, Heat Intolerance, Memory Loss, Sleep Disturbance or Weight Gain.    Medications      Current Outpatient Medications:   •  allopurinol (ZYLOPRIM) 300 MG tablet, TAKE 1 TABLET BY MOUTH ONCE DAILY, Disp: 90 tablet, Rfl: 1  •  amLODIPine (NORVASC) 10 MG tablet, TAKE 1 TABLET BY MOUTH ONCE DAILY, Disp: 90 tablet, Rfl: 0  •  apixaban (ELIQUIS) 5 MG tablet tablet, Take 1 tablet by mouth Every 12 (Twelve) Hours., Disp: 180 tablet, Rfl: 3  •  Ascorbic Acid (VITAMIN C) 500 MG capsule, Take 1 tablet by mouth 4 (four) times a day., Disp: , Rfl:   •  docusate sodium (COLACE) 100 MG capsule, Take 300 mg by mouth every night., Disp: , Rfl:   •  Ferrous Gluconate (IRON) 240 (27 FE) MG tablet, Take 1 tablet by mouth daily., Disp: , Rfl:   •  finasteride (PROSCAR) 5 MG tablet, TAKE 1 TABLET BY MOUTH AT NIGHT, Disp: 90 tablet, Rfl: 1  •  hydrochlorothiazide (MICROZIDE) 12.5 MG capsule, TAKE 1 CAPSULE BY MOUTH ONCE DAILY IN THE MORNING, Disp: 90 capsule, Rfl: 0  •  lisinopril (PRINIVIL,ZESTRIL) 40 MG tablet, TAKE ONE TABLET BY MOUTH ONCE DAILY, Disp: 90 tablet, Rfl: 3  •  metFORMIN (GLUCOPHAGE) 1000 MG tablet, TAKE 1 TABLET BY MOUTH TWICE DAILY, Disp: 180 tablet, Rfl: 1  •  methenamine (HIPREX) 1 g tablet, TAKE 1/2 (ONE-HALF) TABLET BY MOUTH TWICE DAILY WITH MEALS, Disp: 90 tablet, Rfl: 1  •  omeprazole (priLOSEC) 20 MG capsule, TAKE 1 CAPSULE BY MOUTH ONCE DAILY, Disp: 90 capsule, Rfl: 1  •  pravastatin (PRAVACHOL) 40 MG tablet,  TAKE ONE TABLET BY MOUTH ONCE DAILY, Disp: 90 tablet, Rfl: 1  •  Probiotic Product (PROBIOTIC ADVANCED PO), Take 1 tablet by mouth 2 (Two) Times a Day., Disp: , Rfl:   •  sotalol (BETAPACE) 120 MG tablet, Take 1 tablet by mouth 2 (Two) Times a Day., Disp: 90 tablet, Rfl: 1  •  tamsulosin (FLOMAX) 0.4 MG capsule 24 hr capsule, TAKE 1 CAPSULE BY MOUTH ONCE DAILY, Disp: 90 capsule, Rfl: 1     Allergies    Allergies   Allergen Reactions   • Penicillins Hives   • Xarelto [Rivaroxaban]      GI bleed       Problem List    Patient Active Problem List   Diagnosis   • Atopic rhinitis   • Benign (familial) paraproteinemia   • Type 2 diabetes mellitus, without long-term current use of insulin (CMS/HCC)   • Enlarged prostate without lower urinary tract symptoms (luts)   • Gastroesophageal reflux disease with esophagitis   • Polyp of gallbladder   • Gout   • Hematuria   • Liver cyst   • Hyperlipidemia LDL goal <100   • Essential hypertension   • Nephrolithiasis   • Obstructive sleep apnea syndrome   • Persistent atrial fibrillation (CMS/HCC)   • Stage 3 chronic kidney disease (CMS/HCC)       Medications, Allergies, Problems List and Past History were reviewed and updated.    Physical Examination    /68 (BP Location: Left arm, Patient Position: Sitting, Cuff Size: Adult)   Pulse 64   Temp 98.2 °F (36.8 °C) (Temporal)   Resp 18   Wt 120 kg (264 lb)   BMI 33.00 kg/m²     HEENT: Facies- Within normal limits. Lids- Normal bilaterally. Conjunctiva- Clear bilaterally. Sclera- Anicteric bilaterally.    Neck: Thyroid- non enlarged, symmetric and has no nodules. No bruits are detected. ROM- Normal Range of Motion with no rigidity.    Lungs: Auscultation- Clear to auscultation bilaterally. There are no retractions, clubbing or cyanosis. The Expiratory to Inspiratory ratio is equal.    Cardiovascular: There are no carotid bruits. Heart- Normal Rate with Regular rhythm and no murmurs. There are no gallops. There are no rubs. In the  lower extremities there is no edema. The upper extremities do not have edema.    Abdomen: Soft, benign, non-tender with no masses, hernias, organomegaly or scars.    Dermatologic: The patient has a scaly area on the mid-upper back. The scaly area is brown. The affected skin is maculopapular and the condition is noted to be annular and well demarcated.    Impression and Assessment    Actinic keratosis.    Type 2 Diabetes Mellitus.    Hyperlipidemia.    Essential Hypertension.    Gastroesophageal Reflux Disease.    Plan    Gastroesophageal Reflux Disease Plan: The current plan was continued.    Hyperlipidemia Plan: The patient was instructed to exercise daily, eat a low fat diet and continue his medications.    Essential Hypertension Plan: The current plan was continued.    Type 2 Diabetes Mellitus Plan: The current plan was continued.    Actinic keratosis Plan: Cryotherapy was applied.    Procedure Notes    One lesion was frozen with liquid nitrogen utilizing 40 second freezings x1. Wound care was explained.    Darien was seen today for follow-up.    Diagnoses and all orders for this visit:    Type 2 diabetes mellitus without complication, without long-term current use of insulin (CMS/[facility])  -     Comprehensive Metabolic Panel  -     Hemoglobin A1c  -     POC Microalbumin    Essential hypertension  -     Comprehensive Metabolic Panel  -     POC Urinalysis Dipstick, Automated    Hyperlipidemia, unspecified hyperlipidemia type  -     Comprehensive Metabolic Panel  -     Lipid Panel    Gastroesophageal reflux disease with esophagitis  -     Comprehensive Metabolic Panel    Actinic keratosis        Return to Office    The patient was instructed to return for follow-up in 3 months.    The patient was instructed to return sooner if the condition changes, worsens, or does not resolve.

## 2019-04-18 LAB — BACTERIA SPEC AEROBE CULT: NO GROWTH

## 2019-05-09 ENCOUNTER — OFFICE VISIT (OUTPATIENT)
Dept: INTERNAL MEDICINE | Facility: CLINIC | Age: 84
End: 2019-05-09

## 2019-05-09 VITALS
DIASTOLIC BLOOD PRESSURE: 76 MMHG | RESPIRATION RATE: 18 BRPM | OXYGEN SATURATION: 97 % | SYSTOLIC BLOOD PRESSURE: 132 MMHG | TEMPERATURE: 97.6 F | BODY MASS INDEX: 32.02 KG/M2 | WEIGHT: 257.5 LBS | HEIGHT: 75 IN | HEART RATE: 69 BPM

## 2019-05-09 DIAGNOSIS — J18.9 PNEUMONIA OF LEFT LOWER LOBE DUE TO INFECTIOUS ORGANISM: Primary | ICD-10-CM

## 2019-05-09 DIAGNOSIS — R31.9 HEMATURIA, UNSPECIFIED TYPE: ICD-10-CM

## 2019-05-09 LAB
ANION GAP SERPL CALCULATED.3IONS-SCNC: 11.7 MMOL/L
BASOPHILS # BLD AUTO: 0.05 10*3/MM3 (ref 0–0.2)
BASOPHILS NFR BLD AUTO: 0.7 % (ref 0–1.5)
BILIRUB BLD-MCNC: NEGATIVE MG/DL
BUN BLD-MCNC: 21 MG/DL (ref 8–23)
BUN/CREAT SERPL: 27.6 (ref 7–25)
CALCIUM SPEC-SCNC: 9 MG/DL (ref 8.6–10.5)
CHLORIDE SERPL-SCNC: 101 MMOL/L (ref 98–107)
CLARITY, POC: ABNORMAL
CO2 SERPL-SCNC: 24.3 MMOL/L (ref 22–29)
COLOR UR: ABNORMAL
CREAT BLD-MCNC: 0.76 MG/DL (ref 0.76–1.27)
DEPRECATED RDW RBC AUTO: 47.5 FL (ref 37–54)
EOSINOPHIL # BLD AUTO: 0.34 10*3/MM3 (ref 0–0.4)
EOSINOPHIL NFR BLD AUTO: 4.6 % (ref 0.3–6.2)
ERYTHROCYTE [DISTWIDTH] IN BLOOD BY AUTOMATED COUNT: 13.7 % (ref 12.3–15.4)
EXPIRATION DATE: ABNORMAL
GFR SERPL CREATININE-BSD FRML MDRD: 98 ML/MIN/1.73
GLUCOSE BLD-MCNC: 137 MG/DL (ref 65–99)
GLUCOSE UR STRIP-MCNC: NEGATIVE MG/DL
HCT VFR BLD AUTO: 41 % (ref 37.5–51)
HGB BLD-MCNC: 13.5 G/DL (ref 13–17.7)
IMM GRANULOCYTES # BLD AUTO: 0.04 10*3/MM3 (ref 0–0.05)
IMM GRANULOCYTES NFR BLD AUTO: 0.5 % (ref 0–0.5)
KETONES UR QL: ABNORMAL
LEUKOCYTE EST, POC: ABNORMAL
LYMPHOCYTES # BLD AUTO: 1.79 10*3/MM3 (ref 0.7–3.1)
LYMPHOCYTES NFR BLD AUTO: 24.3 % (ref 19.6–45.3)
Lab: ABNORMAL
MCH RBC QN AUTO: 31.5 PG (ref 26.6–33)
MCHC RBC AUTO-ENTMCNC: 32.9 G/DL (ref 31.5–35.7)
MCV RBC AUTO: 95.6 FL (ref 79–97)
MONOCYTES # BLD AUTO: 0.6 10*3/MM3 (ref 0.1–0.9)
MONOCYTES NFR BLD AUTO: 8.2 % (ref 5–12)
NEUTROPHILS # BLD AUTO: 4.54 10*3/MM3 (ref 1.7–7)
NEUTROPHILS NFR BLD AUTO: 61.7 % (ref 42.7–76)
NITRITE UR-MCNC: POSITIVE MG/ML
NRBC BLD AUTO-RTO: 0 /100 WBC (ref 0–0.2)
PH UR: 5 [PH] (ref 5–8)
PLATELET # BLD AUTO: 229 10*3/MM3 (ref 140–450)
PMV BLD AUTO: 10.8 FL (ref 6–12)
POTASSIUM BLD-SCNC: 3.8 MMOL/L (ref 3.5–5.2)
PROT UR STRIP-MCNC: ABNORMAL MG/DL
RBC # BLD AUTO: 4.29 10*6/MM3 (ref 4.14–5.8)
RBC # UR STRIP: ABNORMAL /UL
SODIUM BLD-SCNC: 137 MMOL/L (ref 136–145)
SP GR UR: 1.02 (ref 1–1.03)
UROBILINOGEN UR QL: NORMAL
WBC NRBC COR # BLD: 7.36 10*3/MM3 (ref 3.4–10.8)

## 2019-05-09 PROCEDURE — 36415 COLL VENOUS BLD VENIPUNCTURE: CPT | Performed by: PHYSICIAN ASSISTANT

## 2019-05-09 PROCEDURE — 85025 COMPLETE CBC W/AUTO DIFF WBC: CPT | Performed by: PHYSICIAN ASSISTANT

## 2019-05-09 PROCEDURE — 87086 URINE CULTURE/COLONY COUNT: CPT | Performed by: PHYSICIAN ASSISTANT

## 2019-05-09 PROCEDURE — 81003 URINALYSIS AUTO W/O SCOPE: CPT | Performed by: PHYSICIAN ASSISTANT

## 2019-05-09 PROCEDURE — 99214 OFFICE O/P EST MOD 30 MIN: CPT | Performed by: PHYSICIAN ASSISTANT

## 2019-05-09 PROCEDURE — 80048 BASIC METABOLIC PNL TOTAL CA: CPT | Performed by: PHYSICIAN ASSISTANT

## 2019-05-09 RX ORDER — CIPROFLOXACIN 500 MG/1
500 TABLET, FILM COATED ORAL 2 TIMES DAILY
Qty: 20 TABLET | Refills: 0 | Status: SHIPPED | OUTPATIENT
Start: 2019-05-09 | End: 2019-05-19

## 2019-05-09 NOTE — PROGRESS NOTES
Chief Complaint   Patient presents with   • Blood in Urine     woke up this am and urine is red    • Cough     x2 weeks       Subjective       History of Present Illness     Darien Camarena is a 83 y.o. male. He presents with <1 day history of gross hematuria. Pt states he has had several episodes of red urine today. He has blood in his urine with every urination today. Pt denies pain with urination, urinary frequency or urgency, abdominal pain, back/ flank pain, fever, chills, N/V/D. He has a remote hx of kidney stone 10+ years ago. He has had a UTI in the past 2+ years ago, and this does not feel similar. He is taking Flomax daily for BPH. He has not tried anything for the tx of this issue.    Pt also reports 2 week history of cough. He has chest tightness and JIMENES, but no SOA at rest. He is fatigued. His cough is dry. He denies fever, chills, sore throat, congestion, fever, chills or any other concerns. He tried Mucinex x2 days without relief of symptoms. No hx pneumonia.       The following portions of the patient's history were reviewed and updated as appropriate: allergies, current medications, past medical history, past social history and problem list.    Allergies   Allergen Reactions   • Penicillins Hives   • Xarelto [Rivaroxaban]      GI bleed     Social History     Tobacco Use   • Smoking status: Former Smoker     Last attempt to quit: 1960     Years since quittin.0   • Smokeless tobacco: Never Used   • Tobacco comment: started at 13 yo.   Substance Use Topics   • Alcohol use: No         Current Outpatient Medications:   •  allopurinol (ZYLOPRIM) 300 MG tablet, TAKE 1 TABLET BY MOUTH ONCE DAILY, Disp: 90 tablet, Rfl: 1  •  amLODIPine (NORVASC) 10 MG tablet, TAKE 1 TABLET BY MOUTH ONCE DAILY, Disp: 90 tablet, Rfl: 0  •  apixaban (ELIQUIS) 5 MG tablet tablet, Take 1 tablet by mouth Every 12 (Twelve) Hours., Disp: 180 tablet, Rfl: 3  •  Ascorbic Acid (VITAMIN C) 500 MG capsule, Take 1 tablet by  mouth 4 (four) times a day., Disp: , Rfl:   •  docusate sodium (COLACE) 100 MG capsule, Take 300 mg by mouth every night., Disp: , Rfl:   •  Ferrous Gluconate (IRON) 240 (27 FE) MG tablet, Take 1 tablet by mouth daily., Disp: , Rfl:   •  finasteride (PROSCAR) 5 MG tablet, TAKE 1 TABLET BY MOUTH AT NIGHT, Disp: 90 tablet, Rfl: 1  •  hydrochlorothiazide (MICROZIDE) 12.5 MG capsule, TAKE 1 CAPSULE BY MOUTH ONCE DAILY IN THE MORNING, Disp: 90 capsule, Rfl: 0  •  lisinopril (PRINIVIL,ZESTRIL) 40 MG tablet, TAKE ONE TABLET BY MOUTH ONCE DAILY, Disp: 90 tablet, Rfl: 3  •  metFORMIN (GLUCOPHAGE) 1000 MG tablet, TAKE 1 TABLET BY MOUTH TWICE DAILY, Disp: 180 tablet, Rfl: 1  •  methenamine (HIPREX) 1 g tablet, TAKE 1/2 (ONE-HALF) TABLET BY MOUTH TWICE DAILY WITH MEALS, Disp: 90 tablet, Rfl: 1  •  omeprazole (priLOSEC) 20 MG capsule, TAKE 1 CAPSULE BY MOUTH ONCE DAILY, Disp: 90 capsule, Rfl: 1  •  pravastatin (PRAVACHOL) 40 MG tablet, TAKE ONE TABLET BY MOUTH ONCE DAILY, Disp: 90 tablet, Rfl: 1  •  Probiotic Product (PROBIOTIC ADVANCED PO), Take 1 tablet by mouth 2 (Two) Times a Day., Disp: , Rfl:   •  sotalol (BETAPACE) 120 MG tablet, Take 1 tablet by mouth 2 (Two) Times a Day., Disp: 90 tablet, Rfl: 1  •  tamsulosin (FLOMAX) 0.4 MG capsule 24 hr capsule, TAKE 1 CAPSULE BY MOUTH ONCE DAILY, Disp: 90 capsule, Rfl: 1  •  ciprofloxacin (CIPRO) 500 MG tablet, Take 1 tablet by mouth 2 (Two) Times a Day for 10 days., Disp: 20 tablet, Rfl: 0    Review of Systems   Constitutional: Negative for appetite change, chills, fatigue, fever and unexpected weight loss.   HENT: Negative for congestion, ear pain, sore throat and trouble swallowing.    Respiratory: Positive for cough, chest tightness and shortness of breath (JIMENES only). Negative for wheezing.    Cardiovascular: Negative for chest pain.   Gastrointestinal: Negative for abdominal pain, diarrhea, nausea and vomiting.   Genitourinary: Positive for hematuria. Negative for decreased  urine volume, dysuria, flank pain, frequency and urgency.   Musculoskeletal: Negative for back pain.   Neurological: Negative for dizziness and headache.       Objective   Vitals:    05/09/19 1553   BP: 132/76   Pulse: 69   Resp: 18   Temp: 97.6 °F (36.4 °C)   SpO2: 97%     Physical Exam   Constitutional: He appears well-developed and well-nourished.   HENT:   Head: Normocephalic and atraumatic.   Right Ear: Tympanic membrane, external ear and ear canal normal.   Left Ear: Tympanic membrane, external ear and ear canal normal.   Mouth/Throat: Oropharynx is clear and moist and mucous membranes are normal.   Eyes: Conjunctivae are normal.   Neck: Normal range of motion. Neck supple.   Cardiovascular: Normal rate and regular rhythm.   Pulmonary/Chest: Effort normal. No respiratory distress. He has no decreased breath sounds. He has no wheezes. He has rhonchi in the left lower field. He has rales in the left lower field.   +crackles at base of L lung, with significant rhonchi throughout L lower lobe.    Abdominal: Soft. He exhibits no mass. There is no hepatosplenomegaly. There is no tenderness. There is no CVA tenderness and negative Lake's sign.   Lymphadenopathy:     He has no cervical adenopathy.   Psychiatric: He has a normal mood and affect. His behavior is normal.     Results for orders placed or performed in visit on 05/09/19   POCT urinalysis dipstick, automated   Result Value Ref Range    Color Red (A) Yellow, Straw, Dark Yellow, Anastasia    Clarity, UA Cloudy (A) Clear    Specific Gravity  1.020 1.005 - 1.030    pH, Urine 5.0 5.0 - 8.0    Leukocytes 500 Germaine/ul (A) Negative    Nitrite, UA Positive (A) Negative    Protein,  mg/dL (A) Negative mg/dL    Glucose, UA Negative Negative, 1000 mg/dL (3+) mg/dL    Ketones, UA 15 mg/dL (A) Negative    Urobilinogen, UA Normal Normal    Bilirubin Negative Negative    Blood,  Meet/ul (A) Negative    Lot Number [id number]     Expiration Date 1-31-20           Assessment/Plan   Darien was seen today for blood in urine and cough.    Diagnoses and all orders for this visit:    Pneumonia of left lower lobe due to infectious organism (CMS/HCC)  -     ciprofloxacin (CIPRO) 500 MG tablet; Take 1 tablet by mouth 2 (Two) Times a Day for 10 days.    Hematuria, unspecified type  -     POCT urinalysis dipstick, automated  -     Urine Culture - Urine, Urine, Clean Catch  -     Basic Metabolic Panel  -     CBC & Differential  -     CBC Auto Differential  -     CT Abdomen Pelvis With & Without Contrast; Future      Will cover for pneumonia + possible UTI with cipro. Finish all Abx through completion.  Further plans after review of labs and imaging.          Return for Next scheduled follow up.

## 2019-05-10 LAB — BACTERIA SPEC AEROBE CULT: NORMAL

## 2019-05-13 ENCOUNTER — HOSPITAL ENCOUNTER (OUTPATIENT)
Dept: CT IMAGING | Facility: HOSPITAL | Age: 84
Discharge: HOME | End: 2019-05-13
Admitting: PHYSICIAN ASSISTANT

## 2019-05-13 DIAGNOSIS — R31.9 HEMATURIA, UNSPECIFIED TYPE: ICD-10-CM

## 2019-05-13 PROCEDURE — 25010000002 IOPAMIDOL 61 % SOLUTION: Performed by: PHYSICIAN ASSISTANT

## 2019-05-13 PROCEDURE — 74178 CT ABD&PLV WO CNTR FLWD CNTR: CPT

## 2019-05-13 RX ADMIN — IOPAMIDOL 90 ML: 612 INJECTION, SOLUTION INTRAVENOUS at 16:00

## 2019-05-28 ENCOUNTER — CONSULT (OUTPATIENT)
Dept: CARDIOLOGY | Facility: CLINIC | Age: 84
End: 2019-05-28

## 2019-05-28 VITALS
SYSTOLIC BLOOD PRESSURE: 142 MMHG | BODY MASS INDEX: 32.2 KG/M2 | WEIGHT: 259 LBS | DIASTOLIC BLOOD PRESSURE: 72 MMHG | HEART RATE: 75 BPM | OXYGEN SATURATION: 95 % | HEIGHT: 75 IN

## 2019-05-28 DIAGNOSIS — Z79.899 LONG TERM CURRENT USE OF ANTIARRHYTHMIC MEDICAL THERAPY: ICD-10-CM

## 2019-05-28 DIAGNOSIS — I48.19 PERSISTENT ATRIAL FIBRILLATION (HCC): Primary | ICD-10-CM

## 2019-05-28 PROCEDURE — 99204 OFFICE O/P NEW MOD 45 MIN: CPT | Performed by: INTERNAL MEDICINE

## 2019-05-28 PROCEDURE — 93000 ELECTROCARDIOGRAM COMPLETE: CPT | Performed by: INTERNAL MEDICINE

## 2019-05-28 NOTE — PROGRESS NOTES
Darien Camarena  1935  PCP: Pito Way MD    SUBJECTIVE:   Darien Camarena is a 83 y.o. male seen for a consultation visit regarding the following:     Chief Complaint:   Chief Complaint   Patient presents with   • PAF     Consult          Consultation is requested by Titus Bui* for evaluation of PAF (Consult)        History:  This is an 84 yo patient referred by Dr. Oliveros for persistent atrial fibrillation.  In October 2018 the patient was found to be in atrial fibrillation with RVR and an external cardioversion was scheduled which successfully restored normal sinus rhythm.  When he was reevaluated in February 2019 the patient had been maintaining normal sinus rhythm. In April 2019 the patient presented to [facility] once again being out of rhythm with EKG confirming atrial fibrillation with RVR.  An external cardioversion was attempted in the emergency room but was unsuccessful so he was admitted to the cardiovascular observation unit.  Cardioversion was reattempted by cardiology which did restore normal sinus rhythm.  He was discharged home later that day on a higher dose of Sotalol.  The patient has always been anticoagulated with Eliquis.  He is tolerating without reports of active overt bleeding.  Since his last cardioversion he is maintaining normal sinus rhythm.       Cardiac PMH: (Old records have been reviewed and summarized below)  1. A. Fib - Dx Aug 2012  2.  Diabetes  3.  Obstructive sleep apnea on CPAP  4.  Hypertension    Past Medical History, Past Surgical History, Family history, Social History, and Medications were all reviewed with the patient today and updated as necessary.       Current Outpatient Medications:   •  allopurinol (ZYLOPRIM) 300 MG tablet, TAKE 1 TABLET BY MOUTH ONCE DAILY, Disp: 90 tablet, Rfl: 1  •  amLODIPine (NORVASC) 10 MG tablet, TAKE 1 TABLET BY MOUTH ONCE DAILY, Disp: 90 tablet, Rfl: 0  •  apixaban (ELIQUIS) 5 MG tablet  tablet, Take 1 tablet by mouth Every 12 (Twelve) Hours., Disp: 180 tablet, Rfl: 3  •  Ascorbic Acid (VITAMIN C) 500 MG capsule, Take 1 tablet by mouth 4 (four) times a day., Disp: , Rfl:   •  docusate sodium (COLACE) 100 MG capsule, Take 300 mg by mouth every night., Disp: , Rfl:   •  Ferrous Gluconate (IRON) 240 (27 FE) MG tablet, Take 1 tablet by mouth daily., Disp: , Rfl:   •  finasteride (PROSCAR) 5 MG tablet, TAKE 1 TABLET BY MOUTH AT NIGHT, Disp: 90 tablet, Rfl: 1  •  hydrochlorothiazide (MICROZIDE) 12.5 MG capsule, TAKE 1 CAPSULE BY MOUTH ONCE DAILY IN THE MORNING, Disp: 90 capsule, Rfl: 0  •  lisinopril (PRINIVIL,ZESTRIL) 40 MG tablet, TAKE ONE TABLET BY MOUTH ONCE DAILY, Disp: 90 tablet, Rfl: 3  •  metFORMIN (GLUCOPHAGE) 1000 MG tablet, TAKE 1 TABLET BY MOUTH TWICE DAILY, Disp: 180 tablet, Rfl: 1  •  methenamine (HIPREX) 1 g tablet, TAKE 1/2 (ONE-HALF) TABLET BY MOUTH TWICE DAILY WITH MEALS, Disp: 90 tablet, Rfl: 1  •  omeprazole (priLOSEC) 20 MG capsule, TAKE 1 CAPSULE BY MOUTH ONCE DAILY, Disp: 90 capsule, Rfl: 1  •  pravastatin (PRAVACHOL) 40 MG tablet, TAKE ONE TABLET BY MOUTH ONCE DAILY, Disp: 90 tablet, Rfl: 1  •  Probiotic Product (PROBIOTIC ADVANCED PO), Take 1 tablet by mouth 2 (Two) Times a Day., Disp: , Rfl:   •  sotalol (BETAPACE) 120 MG tablet, Take 1 tablet by mouth 2 (Two) Times a Day., Disp: 90 tablet, Rfl: 1  •  tamsulosin (FLOMAX) 0.4 MG capsule 24 hr capsule, TAKE 1 CAPSULE BY MOUTH ONCE DAILY, Disp: 90 capsule, Rfl: 1    Allergies   Allergen Reactions   • Penicillins Hives   • Xarelto [Rivaroxaban]      GI bleed         Past Medical History:   Diagnosis Date   • Atrial fibrillation (CMS/HCC)    • Chronic kidney disease    • Diabetes mellitus (CMS/HCC)    • Gout    • History of colonoscopy 09/12/2012   • Hyperlipidemia    • Hypertension    • PAF (paroxysmal atrial fibrillation) (CMS/HCC)    • Prostatism    • Sleep apnea     CPAP HS     Past Surgical History:   Procedure Laterality Date  "  • CARDIOVERSION     • CATARACT EXTRACTION Left    • KIDNEY STONE SURGERY       Family History   Problem Relation Age of Onset   • Alzheimer's disease Mother    • Cancer Father    • COPD Father      Social History     Tobacco Use   • Smoking status: Former Smoker     Last attempt to quit: 1960     Years since quittin.0   • Smokeless tobacco: Never Used   • Tobacco comment: started at 13 yo.   Substance Use Topics   • Alcohol use: No       ROS:  Review of Systems:  General: no recent weight loss/gain, weakness or fatigue  Skin: no rashes, lumps, or other skin changes  HEENT: no dizziness, lightheadedness, or vision changes  Respiratory: no cough or hemoptysis  Cardiovascular: + palpitations, and tachycardia when in A. fib  Gastrointestinal: no black/tarry stools or diarrhea  Urinary: no change in frequency or urgency  Peripheral Vascular: no claudication or leg cramps  Musculoskeletal: no muscle or joint pain/stiffness  Psychiatric: no depression or excessive stress  Neurological: no sensory or motor loss, no syncope  Hematologic: no anemia, easy bruising or bleeding  Endocrine: no thyroid problems, nor heat or cold intolerance       PHYSICAL EXAM:   /72 (BP Location: Left arm, Patient Position: Sitting)   Pulse 75   Ht 190.5 cm (75\")   Wt 117 kg (259 lb)   SpO2 95%   BMI 32.37 kg/m²      Wt Readings from Last 5 Encounters:   19 117 kg (259 lb)   19 117 kg (257 lb 8 oz)   19 120 kg (264 lb)   19 119 kg (262 lb)   19 121 kg (267 lb)     BP Readings from Last 5 Encounters:   19 142/72   19 132/76   19 140/68   19 146/70   19 139/91       General-Well Nourished, Well developed  Eyes - PERRLA  Neck- supple, No mass  CV- regular rate and rhythm, no MRG  Lung- clear bilaterally  Abd- soft, +BS  Musc/skel - Norm strength and range of motion  Skin- warm and dry  Neuro - Alert & Oriented x 3, appropriate mood.    Medical problems and test " results were reviewed with the patient today.     Results for orders placed or performed in visit on 05/09/19   Urine Culture - Urine, Urine, Clean Catch   Result Value Ref Range    Urine Culture 50,000 CFU/mL Mixed Kareen Isolated    Basic Metabolic Panel   Result Value Ref Range    Glucose 137 (H) 65 - 99 mg/dL    BUN 21 8 - 23 mg/dL    Creatinine 0.76 0.76 - 1.27 mg/dL    Sodium 137 136 - 145 mmol/L    Potassium 3.8 3.5 - 5.2 mmol/L    Chloride 101 98 - 107 mmol/L    CO2 24.3 22.0 - 29.0 mmol/L    Calcium 9.0 8.6 - 10.5 mg/dL    eGFR Non African Amer 98 >60 mL/min/1.73    BUN/Creatinine Ratio 27.6 (H) 7.0 - 25.0    Anion Gap 11.7 mmol/L   CBC Auto Differential   Result Value Ref Range    WBC 7.36 3.40 - 10.80 10*3/mm3    RBC 4.29 4.14 - 5.80 10*6/mm3    Hemoglobin 13.5 13.0 - 17.7 g/dL    Hematocrit 41.0 37.5 - 51.0 %    MCV 95.6 79.0 - 97.0 fL    MCH 31.5 26.6 - 33.0 pg    MCHC 32.9 31.5 - 35.7 g/dL    RDW 13.7 12.3 - 15.4 %    RDW-SD 47.5 37.0 - 54.0 fl    MPV 10.8 6.0 - 12.0 fL    Platelets 229 140 - 450 10*3/mm3    Neutrophil % 61.7 42.7 - 76.0 %    Lymphocyte % 24.3 19.6 - 45.3 %    Monocyte % 8.2 5.0 - 12.0 %    Eosinophil % 4.6 0.3 - 6.2 %    Basophil % 0.7 0.0 - 1.5 %    Immature Grans % 0.5 0.0 - 0.5 %    Neutrophils, Absolute 4.54 1.70 - 7.00 10*3/mm3    Lymphocytes, Absolute 1.79 0.70 - 3.10 10*3/mm3    Monocytes, Absolute 0.60 0.10 - 0.90 10*3/mm3    Eosinophils, Absolute 0.34 0.00 - 0.40 10*3/mm3    Basophils, Absolute 0.05 0.00 - 0.20 10*3/mm3    Immature Grans, Absolute 0.04 0.00 - 0.05 10*3/mm3    nRBC 0.0 0.0 - 0.2 /100 WBC   POCT urinalysis dipstick, automated   Result Value Ref Range    Color Red (A) Yellow, Straw, Dark Yellow, Anastasia    Clarity, UA Cloudy (A) Clear    Specific Gravity  1.020 1.005 - 1.030    pH, Urine 5.0 5.0 - 8.0    Leukocytes 500 Germaine/ul (A) Negative    Nitrite, UA Positive (A) Negative    Protein,  mg/dL (A) Negative mg/dL    Glucose, UA Negative Negative, 1000 mg/dL  (3+) mg/dL    Ketones, UA 15 mg/dL (A) Negative    Urobilinogen, UA Normal Normal    Bilirubin Negative Negative    Blood,  Meet/ul (A) Negative    Lot Number [id number]     Expiration Date 1-31-20          Lab Results   Component Value Date    CHOL 108 04/17/2019    HDL 28 (L) 04/17/2019    LDL 29 04/17/2019    VLDL 50.6 (H) 04/17/2019       EKG:  (EKG/Tracing has been independently visualized by me and summarized below)      ECG 12 Lead  Date/Time: 5/28/2019 4:16 PM  Performed by: Carlos Chandler MD  Authorized by: Carlos Chandler MD   Comparison: compared with previous ECG from 4/9/2019  Similar to previous ECG  Rhythm: sinus rhythm  Rate: normal  BPM: 70  Other findings: non-specific ST-T wave changes    Clinical impression: abnormal EKG            ASSESSMENT and PLAN  1.  Atrial fibrillation-periods of rapid rates when in A. fib-he has been on sotalol low-dose for many years.  The dose was increased to 120 mg and he has since been doing much better.  We did discuss with him options including catheter-based ablation.  However, with his advancing age and other comorbidities we would like to take a more conservative approach at this time.  Since he is doing well on the sotalol 120 mg twice a day we will continue him on the current dose.  Should A. fib return in the future we will discuss possibility of catheter-based ablation versus pacemaker and AV node ablation.  2.  Use of sotalol-stable QTC on EKG    Return in about 3 months (around 8/28/2019).            Carlos Chandler M.D., F.A.C.C, F.H.R.S.  Cardiology/Electrophysiology  05/28/19  4:17 PM

## 2019-06-03 RX ORDER — TAMSULOSIN HYDROCHLORIDE 0.4 MG/1
CAPSULE ORAL
Qty: 90 CAPSULE | Refills: 1 | Status: SHIPPED | OUTPATIENT
Start: 2019-06-03 | End: 2019-11-23 | Stop reason: SDUPTHER

## 2019-06-10 RX ORDER — SOTALOL HYDROCHLORIDE 120 MG/1
TABLET ORAL
Qty: 180 TABLET | Refills: 1 | Status: SHIPPED | OUTPATIENT
Start: 2019-06-10 | End: 2019-11-26 | Stop reason: SDUPTHER

## 2019-06-14 ENCOUNTER — TELEPHONE (OUTPATIENT)
Dept: CARDIOLOGY | Facility: CLINIC | Age: 84
End: 2019-06-14

## 2019-06-22 DIAGNOSIS — I10 ESSENTIAL HYPERTENSION: Chronic | ICD-10-CM

## 2019-06-24 RX ORDER — OMEPRAZOLE 20 MG/1
CAPSULE, DELAYED RELEASE ORAL
Qty: 90 CAPSULE | Refills: 1 | Status: SHIPPED | OUTPATIENT
Start: 2019-06-24 | End: 2019-12-07 | Stop reason: SDUPTHER

## 2019-06-24 RX ORDER — AMLODIPINE BESYLATE 10 MG/1
TABLET ORAL
Qty: 90 TABLET | Refills: 1 | Status: SHIPPED | OUTPATIENT
Start: 2019-06-24 | End: 2019-12-30

## 2019-06-24 RX ORDER — HYDROCHLOROTHIAZIDE 12.5 MG/1
CAPSULE, GELATIN COATED ORAL
Qty: 90 CAPSULE | Refills: 1 | Status: SHIPPED | OUTPATIENT
Start: 2019-06-24 | End: 2020-01-06

## 2019-07-09 RX ORDER — FINASTERIDE 5 MG/1
TABLET, FILM COATED ORAL
Qty: 90 TABLET | Refills: 1 | Status: SHIPPED | OUTPATIENT
Start: 2019-07-09 | End: 2020-01-06

## 2019-08-05 RX ORDER — METHENAMINE HIPPURATE 1000 MG/1
TABLET ORAL
Qty: 90 TABLET | Refills: 1 | Status: SHIPPED | OUTPATIENT
Start: 2019-08-05 | End: 2020-01-20

## 2019-08-15 ENCOUNTER — OFFICE VISIT (OUTPATIENT)
Dept: INTERNAL MEDICINE | Facility: CLINIC | Age: 84
End: 2019-08-15

## 2019-08-15 VITALS
TEMPERATURE: 97.7 F | HEART RATE: 60 BPM | BODY MASS INDEX: 32.12 KG/M2 | WEIGHT: 257 LBS | RESPIRATION RATE: 16 BRPM | DIASTOLIC BLOOD PRESSURE: 68 MMHG | SYSTOLIC BLOOD PRESSURE: 128 MMHG

## 2019-08-15 DIAGNOSIS — E11.9 TYPE 2 DIABETES MELLITUS WITHOUT COMPLICATION, WITHOUT LONG-TERM CURRENT USE OF INSULIN (HCC): Primary | ICD-10-CM

## 2019-08-15 DIAGNOSIS — I10 ESSENTIAL HYPERTENSION: ICD-10-CM

## 2019-08-15 DIAGNOSIS — K21.00 GASTROESOPHAGEAL REFLUX DISEASE WITH ESOPHAGITIS: ICD-10-CM

## 2019-08-15 DIAGNOSIS — E78.5 HYPERLIPIDEMIA, UNSPECIFIED HYPERLIPIDEMIA TYPE: ICD-10-CM

## 2019-08-15 PROCEDURE — 36415 COLL VENOUS BLD VENIPUNCTURE: CPT | Performed by: INTERNAL MEDICINE

## 2019-08-15 PROCEDURE — 99214 OFFICE O/P EST MOD 30 MIN: CPT | Performed by: INTERNAL MEDICINE

## 2019-08-15 PROCEDURE — 80053 COMPREHEN METABOLIC PANEL: CPT | Performed by: INTERNAL MEDICINE

## 2019-08-15 PROCEDURE — 80061 LIPID PANEL: CPT | Performed by: INTERNAL MEDICINE

## 2019-08-15 PROCEDURE — 83036 HEMOGLOBIN GLYCOSYLATED A1C: CPT | Performed by: INTERNAL MEDICINE

## 2019-08-15 NOTE — PROGRESS NOTES
Chief Complaint   Patient presents with   • Follow-up     4 month follow up chronic medical problems       History of Present Illness      The patient presents for a follow-up related to Type 2 Diabetes Mellitus and reports that he doesn't check his blood sugars at home. He denies hypoglycemic symptoms. The patient denies polyuria, polydypsia or polyphagia. He reports no symptoms of neuropathy. The patient takes his medication as prescribed. He is not taking insulin. The patient does check his feet daily at home. He denies chest pain, shortness of breath, orthopnea, paroxysmal nocturnal dyspnea, dyspnea on exertion, edema, palpitations or syncope.    The patient presents for a follow-up related to GERD. The patient is on omeprazole for his gastroesophageal reflux. The medication is taken on a regular basis and gives complete relief of the symptoms. He reports no abdominal pain, belching, diarrhea, dysphagia, early satiety, heartburn, hoarseness, nausea, odynophagia, rectal bleeding, vomiting or weight loss. The GERD has no known aggravating factors.    The patient presents for a follow-up related to hyperlipidemia. He is following a low fat diet. He reports that he is exercising. He is taking pravastatin. The patient is taking his medication as prescribed. He reports no medication side effects, including muscle cramps, abdominal pain, headaches or weakness.    The patient presents for a follow-up related to hypertension. The patient reports that he has had no headaches or blurred vision. He states that he is taking his medication as prescribed. He is not having medication side effects.    Review of Systems    CONSTITUTIONAL- Denies Fever, Chills, Sweats, Fatigue, Weakness or Malaise.    PULMONARY- Denies Wheezing, Sputum Production, Cough, Hemoptysis or Pleuritic Chest Pain.    CARDIOVASCULAR- Denies Claudication or Irregular Heart Beat.    Medications      Current Outpatient Medications:   •  allopurinol (ZYLOPRIM)  300 MG tablet, TAKE 1 TABLET BY MOUTH ONCE DAILY, Disp: 90 tablet, Rfl: 1  •  amLODIPine (NORVASC) 10 MG tablet, TAKE 1 TABLET BY MOUTH ONCE DAILY, Disp: 90 tablet, Rfl: 1  •  apixaban (ELIQUIS) 5 MG tablet tablet, Take 1 tablet by mouth Every 12 (Twelve) Hours., Disp: 180 tablet, Rfl: 3  •  Ascorbic Acid (VITAMIN C) 500 MG capsule, Take 1 tablet by mouth 4 (four) times a day., Disp: , Rfl:   •  docusate sodium (COLACE) 100 MG capsule, Take 300 mg by mouth every night., Disp: , Rfl:   •  Ferrous Gluconate (IRON) 240 (27 FE) MG tablet, Take 1 tablet by mouth daily., Disp: , Rfl:   •  finasteride (PROSCAR) 5 MG tablet, TAKE 1 TABLET BY MOUTH AT NIGHT, Disp: 90 tablet, Rfl: 1  •  hydrochlorothiazide (MICROZIDE) 12.5 MG capsule, TAKE 1 CAPSULE BY MOUTH ONCE DAILY IN THE MORNING, Disp: 90 capsule, Rfl: 1  •  lisinopril (PRINIVIL,ZESTRIL) 40 MG tablet, TAKE ONE TABLET BY MOUTH ONCE DAILY, Disp: 90 tablet, Rfl: 3  •  metFORMIN (GLUCOPHAGE) 1000 MG tablet, TAKE 1 TABLET BY MOUTH TWICE DAILY, Disp: 180 tablet, Rfl: 1  •  methenamine (HIPREX) 1 g tablet, TAKE 1/2 (ONE-HALF) TABLET BY MOUTH TWICE DAILY WITH A MEAL, Disp: 90 tablet, Rfl: 1  •  omeprazole (priLOSEC) 20 MG capsule, TAKE 1 CAPSULE BY MOUTH ONCE DAILY, Disp: 90 capsule, Rfl: 1  •  pravastatin (PRAVACHOL) 40 MG tablet, TAKE ONE TABLET BY MOUTH ONCE DAILY, Disp: 90 tablet, Rfl: 1  •  Probiotic Product (PROBIOTIC ADVANCED PO), Take 1 tablet by mouth 2 (Two) Times a Day., Disp: , Rfl:   •  sotalol (BETAPACE) 120 MG tablet, TAKE 1 TABLET BY MOUTH TWICE DAILY, Disp: 180 tablet, Rfl: 1  •  tamsulosin (FLOMAX) 0.4 MG capsule 24 hr capsule, TAKE 1 CAPSULE BY MOUTH ONCE DAILY, Disp: 90 capsule, Rfl: 1     Allergies    Allergies   Allergen Reactions   • Penicillins Hives   • Xarelto [Rivaroxaban]      GI bleed       Problem List    Patient Active Problem List   Diagnosis   • Atopic rhinitis   • Benign (familial) paraproteinemia   • Type 2 diabetes mellitus, without long-term  current use of insulin (CMS/HCC)   • Enlarged prostate without lower urinary tract symptoms (luts)   • Gastroesophageal reflux disease with esophagitis   • Polyp of gallbladder   • Gout   • Hematuria   • Liver cyst   • Hyperlipidemia LDL goal <100   • Essential hypertension   • Nephrolithiasis   • Obstructive sleep apnea syndrome   • Persistent atrial fibrillation (CMS/HCC)   • Stage 3 chronic kidney disease (CMS/HCC)   • Long term current use of antiarrhythmic medical therapy       Medications, Allergies, Problems List and Past History were reviewed and updated.    Physical Examination    /68 (BP Location: Left arm, Patient Position: Sitting, Cuff Size: Adult)   Pulse 60   Temp 97.7 °F (36.5 °C) (Temporal)   Resp 16   Wt 117 kg (257 lb)   BMI 32.12 kg/m²     HEENT: Head- Normocephalic Atraumatic. Facies- Within normal limits. Pinnas- Normal texture and shape bilaterally. Canals- Normal bilaterally. TMs- Normal bilaterally. Nares- Patent bilaterally. Nasal Septum- is normal. There is no tenderness to palpation over the frontal or maxillary sinuses. Lids- Normal bilaterally. Conjunctiva- Clear bilaterally. Sclera- Anicteric bilaterally. Oropharynx- Moist with no lesions. Tonsils- No enlargement, erythema or exudate.    Neck: Thyroid- non enlarged, symmetric and has no nodules. No bruits are detected. ROM- Normal Range of Motion with no rigidity.    Lungs: Auscultation- Clear to auscultation bilaterally. There are no retractions, clubbing or cyanosis. The Expiratory to Inspiratory ratio is equal.    Cardiovascular: There are no carotid bruits. Heart- Normal Rate with Regular rhythm and no murmurs. There are no gallops. There are no rubs. In the lower extremities there is no edema. The upper extremities do not have edema.    Abdomen: Soft, benign, non-tender with no masses, hernias, organomegaly or scars.    Feet: The feet are symmetric with normal ericka landmarks. There is no tenderness to palpation  bilaterally. The feet have normal posterior tibial and dorsalis pedis pulses and normal capillary refill bilaterally. The monofilament examination is normal bilaterally.       The arches are normal bilaterally. There are no skin or nail lesions present. There are no ingrown nails. There are no bunions noted.    Impression and Assessment    Type 2 Diabetes Mellitus.    Gastroesophageal Reflux Disease.    Hyperlipidemia.    Essential Hypertension.    Plan    Gastroesophageal Reflux Disease Plan: The current plan was continued.    Hyperlipidemia Plan: The patient was instructed to exercise daily, eat a low fat diet and continue his medications.    Essential Hypertension Plan: The current plan was continued.    Type 2 Diabetes Mellitus Plan: The current plan was continued.    Darien was seen today for follow-up.    Diagnoses and all orders for this visit:    Type 2 diabetes mellitus without complication, without long-term current use of insulin (CMS/[facility])  -     Comprehensive Metabolic Panel  -     Hemoglobin A1c    Gastroesophageal reflux disease with esophagitis  -     Comprehensive Metabolic Panel    Essential hypertension  -     Comprehensive Metabolic Panel    Hyperlipidemia, unspecified hyperlipidemia type  -     Comprehensive Metabolic Panel  -     Lipid Panel        Return to Office    The patient was instructed to return for follow-up in 4 months.    The patient was instructed to return sooner if the condition changes, worsens, or does not resolve.

## 2019-08-16 LAB
ALBUMIN SERPL-MCNC: 4.1 G/DL (ref 3.5–5.2)
ALBUMIN/GLOB SERPL: 1.4 G/DL
ALP SERPL-CCNC: 80 U/L (ref 39–117)
ALT SERPL W P-5'-P-CCNC: 18 U/L (ref 1–41)
ANION GAP SERPL CALCULATED.3IONS-SCNC: 13.7 MMOL/L (ref 5–15)
AST SERPL-CCNC: 19 U/L (ref 1–40)
BILIRUB SERPL-MCNC: 0.7 MG/DL (ref 0.2–1.2)
BUN BLD-MCNC: 29 MG/DL (ref 8–23)
BUN/CREAT SERPL: 35.4 (ref 7–25)
CALCIUM SPEC-SCNC: 9.5 MG/DL (ref 8.6–10.5)
CHLORIDE SERPL-SCNC: 105 MMOL/L (ref 98–107)
CHOLEST SERPL-MCNC: 106 MG/DL (ref 0–200)
CO2 SERPL-SCNC: 25.3 MMOL/L (ref 22–29)
CREAT BLD-MCNC: 0.82 MG/DL (ref 0.76–1.27)
GFR SERPL CREATININE-BSD FRML MDRD: 90 ML/MIN/1.73
GLOBULIN UR ELPH-MCNC: 3 GM/DL
GLUCOSE BLD-MCNC: 116 MG/DL (ref 65–99)
HBA1C MFR BLD: 6.2 % (ref 4.8–5.6)
HDLC SERPL-MCNC: 27 MG/DL (ref 40–60)
LDLC SERPL CALC-MCNC: 25 MG/DL (ref 0–100)
LDLC/HDLC SERPL: 0.93 {RATIO}
POTASSIUM BLD-SCNC: 4.2 MMOL/L (ref 3.5–5.2)
PROT SERPL-MCNC: 7.1 G/DL (ref 6–8.5)
SODIUM BLD-SCNC: 144 MMOL/L (ref 136–145)
TRIGL SERPL-MCNC: 270 MG/DL (ref 0–150)
VLDLC SERPL-MCNC: 54 MG/DL (ref 5–40)

## 2019-08-31 DIAGNOSIS — I10 ESSENTIAL HYPERTENSION: Chronic | ICD-10-CM

## 2019-09-03 RX ORDER — LISINOPRIL 40 MG/1
TABLET ORAL
Qty: 90 TABLET | Refills: 3 | Status: SHIPPED | OUTPATIENT
Start: 2019-09-03 | End: 2020-08-31

## 2019-09-05 ENCOUNTER — TELEPHONE (OUTPATIENT)
Dept: CARDIOLOGY | Facility: CLINIC | Age: 84
End: 2019-09-05

## 2019-10-14 RX ORDER — ALLOPURINOL 300 MG/1
TABLET ORAL
Qty: 90 TABLET | Refills: 1 | Status: SHIPPED | OUTPATIENT
Start: 2019-10-14 | End: 2020-04-13

## 2019-10-31 ENCOUNTER — APPOINTMENT (OUTPATIENT)
Dept: GENERAL RADIOLOGY | Facility: HOSPITAL | Age: 84
End: 2019-10-31

## 2019-10-31 ENCOUNTER — HOSPITAL ENCOUNTER (EMERGENCY)
Facility: HOSPITAL | Age: 84
Discharge: HOME | End: 2019-10-31
Attending: EMERGENCY MEDICINE | Admitting: EMERGENCY MEDICINE

## 2019-10-31 VITALS
SYSTOLIC BLOOD PRESSURE: 96 MMHG | WEIGHT: 256 LBS | TEMPERATURE: 99.1 F | BODY MASS INDEX: 31.83 KG/M2 | RESPIRATION RATE: 18 BRPM | OXYGEN SATURATION: 95 % | HEART RATE: 64 BPM | DIASTOLIC BLOOD PRESSURE: 62 MMHG | HEIGHT: 75 IN

## 2019-10-31 DIAGNOSIS — E66.9 TYPE 2 DIABETES MELLITUS WITH OBESITY (HCC): ICD-10-CM

## 2019-10-31 DIAGNOSIS — E11.69 TYPE 2 DIABETES MELLITUS WITH OBESITY (HCC): ICD-10-CM

## 2019-10-31 DIAGNOSIS — I48.0 PAROXYSMAL ATRIAL FIBRILLATION WITH RAPID VENTRICULAR RESPONSE (HCC): Primary | ICD-10-CM

## 2019-10-31 DIAGNOSIS — I10 ELEVATED BLOOD PRESSURE READING WITH DIAGNOSIS OF HYPERTENSION: ICD-10-CM

## 2019-10-31 DIAGNOSIS — Z79.01 CHRONIC ANTICOAGULATION: ICD-10-CM

## 2019-10-31 LAB
ALBUMIN SERPL-MCNC: 3.6 G/DL (ref 3.5–5.2)
ALBUMIN/GLOB SERPL: 1 G/DL
ALP SERPL-CCNC: 83 U/L (ref 39–117)
ALT SERPL W P-5'-P-CCNC: 19 U/L (ref 1–41)
ANION GAP SERPL CALCULATED.3IONS-SCNC: 14 MMOL/L (ref 5–15)
AST SERPL-CCNC: 20 U/L (ref 1–40)
BASOPHILS # BLD AUTO: 0.06 10*3/MM3 (ref 0–0.2)
BASOPHILS NFR BLD AUTO: 0.7 % (ref 0–1.5)
BILIRUB SERPL-MCNC: 0.9 MG/DL (ref 0.2–1.2)
BUN BLD-MCNC: 32 MG/DL (ref 8–23)
BUN/CREAT SERPL: 36 (ref 7–25)
CALCIUM SPEC-SCNC: 9.1 MG/DL (ref 8.6–10.5)
CHLORIDE SERPL-SCNC: 104 MMOL/L (ref 98–107)
CO2 SERPL-SCNC: 22 MMOL/L (ref 22–29)
CREAT BLD-MCNC: 0.89 MG/DL (ref 0.76–1.27)
DEPRECATED RDW RBC AUTO: 46.8 FL (ref 37–54)
EOSINOPHIL # BLD AUTO: 0.32 10*3/MM3 (ref 0–0.4)
EOSINOPHIL NFR BLD AUTO: 3.9 % (ref 0.3–6.2)
ERYTHROCYTE [DISTWIDTH] IN BLOOD BY AUTOMATED COUNT: 13.4 % (ref 12.3–15.4)
GFR SERPL CREATININE-BSD FRML MDRD: 82 ML/MIN/1.73
GLOBULIN UR ELPH-MCNC: 3.5 GM/DL
GLUCOSE BLD-MCNC: 180 MG/DL (ref 65–99)
HCT VFR BLD AUTO: 45.9 % (ref 37.5–51)
HGB BLD-MCNC: 15.1 G/DL (ref 13–17.7)
HOLD SPECIMEN: NORMAL
HOLD SPECIMEN: NORMAL
IMM GRANULOCYTES # BLD AUTO: 0.03 10*3/MM3 (ref 0–0.05)
IMM GRANULOCYTES NFR BLD AUTO: 0.4 % (ref 0–0.5)
LYMPHOCYTES # BLD AUTO: 2.03 10*3/MM3 (ref 0.7–3.1)
LYMPHOCYTES NFR BLD AUTO: 24.9 % (ref 19.6–45.3)
MAGNESIUM SERPL-MCNC: 1.7 MG/DL (ref 1.6–2.4)
MCH RBC QN AUTO: 31.2 PG (ref 26.6–33)
MCHC RBC AUTO-ENTMCNC: 32.9 G/DL (ref 31.5–35.7)
MCV RBC AUTO: 94.8 FL (ref 79–97)
MONOCYTES # BLD AUTO: 0.59 10*3/MM3 (ref 0.1–0.9)
MONOCYTES NFR BLD AUTO: 7.2 % (ref 5–12)
NEUTROPHILS # BLD AUTO: 5.11 10*3/MM3 (ref 1.7–7)
NEUTROPHILS NFR BLD AUTO: 62.9 % (ref 42.7–76)
NRBC BLD AUTO-RTO: 0 /100 WBC (ref 0–0.2)
NT-PROBNP SERPL-MCNC: 1445 PG/ML (ref 5–1800)
PLATELET # BLD AUTO: 180 10*3/MM3 (ref 140–450)
PMV BLD AUTO: 10 FL (ref 6–12)
POTASSIUM BLD-SCNC: 4.7 MMOL/L (ref 3.5–5.2)
PROT SERPL-MCNC: 7.1 G/DL (ref 6–8.5)
RBC # BLD AUTO: 4.84 10*6/MM3 (ref 4.14–5.8)
SODIUM BLD-SCNC: 140 MMOL/L (ref 136–145)
TROPONIN T SERPL-MCNC: <0.01 NG/ML (ref 0–0.03)
TSH SERPL DL<=0.05 MIU/L-ACNC: 1.56 UIU/ML (ref 0.27–4.2)
WBC NRBC COR # BLD: 8.14 10*3/MM3 (ref 3.4–10.8)
WHOLE BLOOD HOLD SPECIMEN: NORMAL
WHOLE BLOOD HOLD SPECIMEN: NORMAL

## 2019-10-31 PROCEDURE — 93005 ELECTROCARDIOGRAM TRACING: CPT | Performed by: EMERGENCY MEDICINE

## 2019-10-31 PROCEDURE — 80053 COMPREHEN METABOLIC PANEL: CPT | Performed by: EMERGENCY MEDICINE

## 2019-10-31 PROCEDURE — 84443 ASSAY THYROID STIM HORMONE: CPT | Performed by: EMERGENCY MEDICINE

## 2019-10-31 PROCEDURE — 92960 CARDIOVERSION ELECTRIC EXT: CPT

## 2019-10-31 PROCEDURE — 83735 ASSAY OF MAGNESIUM: CPT | Performed by: EMERGENCY MEDICINE

## 2019-10-31 PROCEDURE — 99285 EMERGENCY DEPT VISIT HI MDM: CPT

## 2019-10-31 PROCEDURE — 84484 ASSAY OF TROPONIN QUANT: CPT | Performed by: EMERGENCY MEDICINE

## 2019-10-31 PROCEDURE — 71045 X-RAY EXAM CHEST 1 VIEW: CPT

## 2019-10-31 PROCEDURE — 85025 COMPLETE CBC W/AUTO DIFF WBC: CPT | Performed by: EMERGENCY MEDICINE

## 2019-10-31 PROCEDURE — 83880 ASSAY OF NATRIURETIC PEPTIDE: CPT | Performed by: EMERGENCY MEDICINE

## 2019-10-31 PROCEDURE — 96374 THER/PROPH/DIAG INJ IV PUSH: CPT

## 2019-10-31 RX ORDER — SODIUM CHLORIDE 0.9 % (FLUSH) 0.9 %
10 SYRINGE (ML) INJECTION AS NEEDED
Status: DISCONTINUED | OUTPATIENT
Start: 2019-10-31 | End: 2019-10-31 | Stop reason: HOSPADM

## 2019-10-31 RX ADMIN — METHOHEXITAL SODIUM 60 MG: 500 INJECTION, POWDER, LYOPHILIZED, FOR SOLUTION INTRAMUSCULAR; INTRAVENOUS; RECTAL at 12:42

## 2019-11-09 DIAGNOSIS — E78.5 HYPERLIPIDEMIA LDL GOAL <100: ICD-10-CM

## 2019-11-11 RX ORDER — PRAVASTATIN SODIUM 40 MG
TABLET ORAL
Qty: 90 TABLET | Refills: 3 | Status: SHIPPED | OUTPATIENT
Start: 2019-11-11 | End: 2020-11-09

## 2019-11-25 RX ORDER — TAMSULOSIN HYDROCHLORIDE 0.4 MG/1
CAPSULE ORAL
Qty: 90 CAPSULE | Refills: 1 | Status: SHIPPED | OUTPATIENT
Start: 2019-11-25 | End: 2020-05-26

## 2019-11-26 ENCOUNTER — OFFICE VISIT (OUTPATIENT)
Dept: CARDIOLOGY | Facility: CLINIC | Age: 84
End: 2019-11-26

## 2019-11-26 VITALS
SYSTOLIC BLOOD PRESSURE: 130 MMHG | BODY MASS INDEX: 31.83 KG/M2 | HEIGHT: 75 IN | HEART RATE: 74 BPM | WEIGHT: 256 LBS | DIASTOLIC BLOOD PRESSURE: 70 MMHG | OXYGEN SATURATION: 96 %

## 2019-11-26 DIAGNOSIS — E78.5 HYPERLIPIDEMIA LDL GOAL <100: ICD-10-CM

## 2019-11-26 DIAGNOSIS — I10 ESSENTIAL HYPERTENSION: Chronic | ICD-10-CM

## 2019-11-26 DIAGNOSIS — I48.19 PERSISTENT ATRIAL FIBRILLATION (HCC): Primary | ICD-10-CM

## 2019-11-26 PROCEDURE — 99214 OFFICE O/P EST MOD 30 MIN: CPT | Performed by: INTERNAL MEDICINE

## 2019-11-26 RX ORDER — SOTALOL HYDROCHLORIDE 120 MG/1
120 TABLET ORAL 2 TIMES DAILY
Qty: 180 TABLET | Refills: 1 | Status: SHIPPED | OUTPATIENT
Start: 2019-11-26 | End: 2020-06-08

## 2019-11-26 NOTE — PROGRESS NOTES
[facility] at   Follow-up note    Encounter Date:11/26/2019    Patient ID: Darien Camarena is a  84 y.o. male who resides in [place], [place].    CC/Reason for visit:  Atrial Fibrillation           Problem List Items Addressed This Visit        Cardiology Problems    Persistent atrial fibrillation - Primary    Overview     · Diagnosed August 2012.   · FARHAD-guided ECV (08/17/2012).  · CHADS-VASc 5 (age > 75, CAD, HTN, DM)  · Successful external cardioversion for asymptomatic atrial fibrillation with RVR, 10/25/18  · Successful cardioversion for atrial fibrillation RVR, 3/6/19.  Sotalol increased  · Clinic visit 4/9/2019 maintaining NSR  · Minimally symptomatic atrial fibrillation with cardioversion and the ER, 10/31/2019         Current Assessment & Plan     · Patient had recurrence of atrial fibrillation in October with cardioversion in the emergency room  · Continue sotalol at present dose for rhythm control  · Continue Eliquis for stroke         Relevant Medications    apixaban (ELIQUIS) 5 MG tablet tablet    sotalol (BETAPACE) 120 MG tablet    Hyperlipidemia LDL goal <100    Overview     · Moderate to high intensity statin therapy indicated given the presence diabetes         Current Assessment & Plan     · Continue pravastatin for primary prevention         Essential hypertension    Current Assessment & Plan     · Controlled  · Continue present medical therapy         Relevant Medications    sotalol (BETAPACE) 120 MG tablet               · Continue present medical therapy  Return in about 6 months (around 5/26/2020).            Darien Camarena is a 84 y.o. male who returns to my office for follow-up of atrial fibrillation.  The patient has been taking sotalol for a rhythm control strategy of his atrial fibrillation.  At the end of October, the patient detected irregularity of his pulse.  He did not have particularly bothersome symptoms but did present to the emergency  room where he was found to be in atrial fibrillation with a ventricular rate of 132 bpm.  He underwent cardioversion in the ER and was discharged home the same night.  The patient states he has been feeling well otherwise and denies any angina or heart failure symptoms.  He is tolerating Eliquis with no bleeding complications.    Review of Systems   Constitution: Negative for weakness and malaise/fatigue.   Eyes: Negative for vision loss in left eye and vision loss in right eye.   Cardiovascular: Negative for chest pain, dyspnea on exertion, near-syncope, orthopnea, palpitations, paroxysmal nocturnal dyspnea and syncope.   Musculoskeletal: Negative for myalgias.   Neurological: Negative for brief paralysis, excessive daytime sleepiness, focal weakness, numbness and paresthesias.   All other systems reviewed and are negative.      The patient's past medical, social, family history and ROS reviewed in the patient's electronic medical record.    Allergies  Penicillins and Xarelto [rivaroxaban]    Outpatient Medications Marked as Taking for the 11/26/19 encounter (Office Visit) with Titus Oliveros IV, MD   Medication Sig Dispense Refill   • allopurinol (ZYLOPRIM) 300 MG tablet TAKE 1 TABLET BY MOUTH ONCE DAILY 90 tablet 1   • amLODIPine (NORVASC) 10 MG tablet TAKE 1 TABLET BY MOUTH ONCE DAILY 90 tablet 1   • apixaban (ELIQUIS) 5 MG tablet tablet Take 1 tablet by mouth Every 12 (Twelve) Hours. 180 tablet 1   • Ascorbic Acid (VITAMIN C) 500 MG capsule Take 1 tablet by mouth 4 (four) times a day.     • docusate sodium (COLACE) 100 MG capsule Take 300 mg by mouth every night.     • Ferrous Gluconate (IRON) 240 (27 FE) MG tablet Take 1 tablet by mouth daily.     • finasteride (PROSCAR) 5 MG tablet TAKE 1 TABLET BY MOUTH AT NIGHT 90 tablet 1   • hydrochlorothiazide (MICROZIDE) 12.5 MG capsule TAKE 1 CAPSULE BY MOUTH ONCE DAILY IN THE MORNING 90 capsule 1   • lisinopril (PRINIVIL,ZESTRIL) 40 MG tablet TAKE 1 TABLET  "BY MOUTH ONCE DAILY 90 tablet 3   • metFORMIN (GLUCOPHAGE) 1000 MG tablet TAKE 1 TABLET BY MOUTH TWICE DAILY 180 tablet 1   • methenamine (HIPREX) 1 g tablet TAKE 1/2 (ONE-HALF) TABLET BY MOUTH TWICE DAILY WITH A MEAL 90 tablet 1   • omeprazole (priLOSEC) 20 MG capsule TAKE 1 CAPSULE BY MOUTH ONCE DAILY 90 capsule 1   • pravastatin (PRAVACHOL) 40 MG tablet TAKE 1 TABLET BY MOUTH ONCE DAILY 90 tablet 3   • Probiotic Product (PROBIOTIC ADVANCED PO) Take 1 tablet by mouth 2 (Two) Times a Day.     • sotalol (BETAPACE) 120 MG tablet Take 1 tablet by mouth 2 (Two) Times a Day. 180 tablet 1   • tamsulosin (FLOMAX) 0.4 MG capsule 24 hr capsule TAKE 1 CAPSULE BY MOUTH ONCE DAILY 90 capsule 1   • [DISCONTINUED] apixaban (ELIQUIS) 5 MG tablet tablet Take 1 tablet by mouth Every 12 (Twelve) Hours. 180 tablet 3   • [DISCONTINUED] sotalol (BETAPACE) 120 MG tablet TAKE 1 TABLET BY MOUTH TWICE DAILY 180 tablet 1           Blood pressure 130/70, pulse 74, height 190.5 cm (75\"), weight 116 kg (256 lb), SpO2 96 %.  Body mass index is 32 kg/m².  Vitals:    11/26/19 1516   Patient Position: Sitting       Physical Exam   Constitutional: He is oriented to person, place, and time. He appears well-developed and well-nourished.   HENT:   Head: Normocephalic and atraumatic.   Eyes: Pupils are equal, round, and reactive to light. No scleral icterus.   Neck: No JVD present. Carotid bruit is not present. No thyromegaly present.   Cardiovascular: Normal rate, regular rhythm, S1 normal and S2 normal. Exam reveals no gallop.   No murmur heard.  Pulmonary/Chest: Effort normal and breath sounds normal.   Abdominal: Soft. There is no hepatosplenomegaly. There is no tenderness.   Neurological: He is alert and oriented to person, place, and time.   Skin: Skin is warm and dry. No cyanosis. Nails show no clubbing.   Psychiatric: He has a normal mood and affect. His behavior is normal.       Data Review (reviewed with patient):       ECG 12 " Lead  Date/Time: 11/27/2019 8:46 AM  Performed by: Titus Oliveros IV, MD  Authorized by: Titus Oliveros IV, MD   Comparison: compared with previous ECG from 10/31/2019  Comparison to previous ECG: Sinus rhythm has replaced atrial for ablation  Rhythm: sinus rhythm  Ectopy: atrial premature contractions  BPM: 56              Lab Results   Component Value Date    CHOL 106 08/15/2019    TRIG 270 (H) 08/15/2019    HDL 27 (L) 08/15/2019    LDL 25 08/15/2019    AST 20 10/31/2019    ALT 19 10/31/2019       Lab Results   Component Value Date    HGBA1C 6.20 (H) 08/15/2019         H. C. Taurus Oliveros MD FACC, [facility]  Interventional and General Cardiology

## 2019-11-27 ENCOUNTER — TELEPHONE (OUTPATIENT)
Dept: CARDIOLOGY | Facility: CLINIC | Age: 84
End: 2019-11-27

## 2019-11-27 PROCEDURE — 93000 ELECTROCARDIOGRAM COMPLETE: CPT | Performed by: INTERNAL MEDICINE

## 2019-11-27 NOTE — ASSESSMENT & PLAN NOTE
· Patient had recurrence of atrial fibrillation in October with cardioversion in the emergency room  · Continue sotalol at present dose for rhythm control  · Continue Eliquis for stroke

## 2019-11-27 NOTE — TELEPHONE ENCOUNTER
Notified Eliquis patient assistance has arrived and that he may  at the office today- he states that he will pick it up Monday 12/2/19

## 2019-12-09 RX ORDER — OMEPRAZOLE 20 MG/1
CAPSULE, DELAYED RELEASE ORAL
Qty: 90 CAPSULE | Refills: 1 | Status: SHIPPED | OUTPATIENT
Start: 2019-12-09 | End: 2020-05-26

## 2019-12-12 ENCOUNTER — OFFICE VISIT (OUTPATIENT)
Dept: INTERNAL MEDICINE | Facility: CLINIC | Age: 84
End: 2019-12-12

## 2019-12-12 VITALS
BODY MASS INDEX: 32.5 KG/M2 | DIASTOLIC BLOOD PRESSURE: 68 MMHG | WEIGHT: 260 LBS | HEART RATE: 60 BPM | TEMPERATURE: 98.1 F | SYSTOLIC BLOOD PRESSURE: 126 MMHG | RESPIRATION RATE: 16 BRPM

## 2019-12-12 DIAGNOSIS — E11.9 TYPE 2 DIABETES MELLITUS WITHOUT COMPLICATION, WITHOUT LONG-TERM CURRENT USE OF INSULIN (HCC): Primary | ICD-10-CM

## 2019-12-12 DIAGNOSIS — I10 ESSENTIAL HYPERTENSION: ICD-10-CM

## 2019-12-12 DIAGNOSIS — K21.00 GASTROESOPHAGEAL REFLUX DISEASE WITH ESOPHAGITIS: ICD-10-CM

## 2019-12-12 DIAGNOSIS — E78.5 HYPERLIPIDEMIA, UNSPECIFIED HYPERLIPIDEMIA TYPE: ICD-10-CM

## 2019-12-12 PROCEDURE — 36415 COLL VENOUS BLD VENIPUNCTURE: CPT | Performed by: INTERNAL MEDICINE

## 2019-12-12 PROCEDURE — 80053 COMPREHEN METABOLIC PANEL: CPT | Performed by: INTERNAL MEDICINE

## 2019-12-12 PROCEDURE — 99214 OFFICE O/P EST MOD 30 MIN: CPT | Performed by: INTERNAL MEDICINE

## 2019-12-12 PROCEDURE — 80061 LIPID PANEL: CPT | Performed by: INTERNAL MEDICINE

## 2019-12-12 PROCEDURE — 83036 HEMOGLOBIN GLYCOSYLATED A1C: CPT | Performed by: INTERNAL MEDICINE

## 2019-12-12 NOTE — PATIENT INSTRUCTIONS
Heart-Healthy Eating Plan  Heart-healthy meal planning includes:  · Eating less unhealthy fats.  · Eating more healthy fats.  · Making other changes in your diet.  Talk with your doctor or a diet specialist (dietitian) to create an eating plan that is right for you.  What are tips for following this plan?  Cooking  Avoid frying your food. Try to bake, boil, grill, or broil it instead. You can also reduce fat by:  · Removing the skin from poultry.  · Removing all visible fats from meats.  · Steaming vegetables in water or broth.  Meal planning    · At meals, divide your plate into four equal parts:  ? Fill one-half of your plate with vegetables and green salads.  ? Fill one-fourth of your plate with whole grains.  ? Fill one-fourth of your plate with lean protein foods.  · Eat 4-5 servings of vegetables per day. A serving of vegetables is:  ? 1 cup of raw or cooked vegetables.  ? 2 cups of raw leafy greens.  · Eat 4-5 servings of fruit per day. A serving of fruit is:  ? 1 medium whole fruit.  ? ¼ cup of dried fruit.  ? ½ cup of fresh, frozen, or canned fruit.  ? ½ cup of 100% fruit juice.  · Eat more foods that have soluble fiber. These are apples, broccoli, carrots, beans, peas, and barley. Try to get 20-30 g of fiber per day.  · Eat 4-5 servings of nuts, legumes, and seeds per week:  ? 1 serving of dried beans or legumes equals ½ cup after being cooked.  ? 1 serving of nuts is ¼ cup.  ? 1 serving of seeds equals 1 tablespoon.  General information  · Eat more home-cooked food. Eat less restaurant, buffet, and fast food.  · Limit or avoid alcohol.  · Limit foods that are high in starch and sugar.  · Avoid fried foods.  · Lose weight if you are overweight.  · Keep track of how much salt (sodium) you eat. This is important if you have high blood pressure. Ask your doctor to tell you more about this.  · Try to add vegetarian meals each week.  Fats  · Choose healthy fats. These include olive oil and canola oil,  flaxseeds, walnuts, almonds, and seeds.  · Eat more omega-3 fats. These include salmon, mackerel, sardines, tuna, flaxseed oil, and ground flaxseeds. Try to eat fish at least 2 times each week.  · Check food labels. Avoid foods with trans fats or high amounts of saturated fat.  · Limit saturated fats.  ? These are often found in animal products, such as meats, butter, and cream.  ? These are also found in plant foods, such as palm oil, palm kernel oil, and coconut oil.  · Avoid foods with partially hydrogenated oils in them. These have trans fats. Examples are stick margarine, some tub margarines, cookies, crackers, and other baked goods.  What foods can I eat?  Fruits  All fresh, canned (in natural juice), or frozen fruits.  Vegetables  Fresh or frozen vegetables (raw, steamed, roasted, or grilled). Green salads.  Grains  Most grains. Choose whole wheat and whole grains most of the time. Rice and pasta, including brown rice and pastas made with whole wheat.  Meats and other proteins  Lean, well-trimmed beef, veal, pork, and lamb. Chicken and turkey without skin. All fish and shellfish. Wild duck, rabbit, pheasant, and venison. Egg whites or low-cholesterol egg substitutes. Dried beans, peas, lentils, and tofu. Seeds and most nuts.  Dairy  Low-fat or nonfat cheeses, including ricotta and mozzarella. Skim or 1% milk that is liquid, powdered, or evaporated. Buttermilk that is made with low-fat milk. Nonfat or low-fat yogurt.  Fats and oils  Non-hydrogenated (trans-free) margarines. Vegetable oils, including soybean, sesame, sunflower, olive, peanut, safflower, corn, canola, and cottonseed. Salad dressings or mayonnaise made with a vegetable oil.  Beverages  Mineral water. Coffee and tea. Diet carbonated beverages.  Sweets and desserts  Sherbet, gelatin, and fruit ice. Small amounts of dark chocolate.  Limit all sweets and desserts.  Seasonings and condiments  All seasonings and condiments.  The items listed above may  not be a complete list of foods and drinks you can eat. Contact a dietitian for more options.  What foods should I avoid?  Fruits  Canned fruit in heavy syrup. Fruit in cream or butter sauce. Fried fruit. Limit coconut.  Vegetables  Vegetables cooked in cheese, cream, or butter sauce. Fried vegetables.  Grains  Breads that are made with saturated or trans fats, oils, or whole milk. Croissants. Sweet rolls. Donuts. High-fat crackers, such as cheese crackers.  Meats and other proteins  Fatty meats, such as hot dogs, ribs, sausage, bliss, rib-eye roast or steak. High-fat deli meats, such as salami and bologna. Caviar. Domestic duck and goose. Organ meats, such as liver.  Dairy  Cream, sour cream, cream cheese, and creamed cottage cheese. Whole-milk cheeses. Whole or 2% milk that is liquid, evaporated, or condensed. Whole buttermilk. Cream sauce or high-fat cheese sauce. Yogurt that is made from whole milk.  Fats and oils  Meat fat, or shortening. Cocoa butter, hydrogenated oils, palm oil, coconut oil, palm kernel oil. Solid fats and shortenings, including bliss fat, salt pork, lard, and butter. Nondairy cream substitutes. Salad dressings with cheese or sour cream.  Beverages  Regular sodas and juice drinks with added sugar.  Sweets and desserts  Frosting. Pudding. Cookies. Cakes. Pies. Milk chocolate or white chocolate. Buttered syrups. Full-fat ice cream or ice cream drinks.  The items listed above may not be a complete list of foods and drinks to avoid. Contact a dietitian for more information.  Summary  · Heart-healthy meal planning includes eating less unhealthy fats, eating more healthy fats, and making other changes in your diet.  · Eat a balanced diet. This includes fruits and vegetables, low-fat or nonfat dairy, lean protein, nuts and legumes, whole grains, and heart-healthy oils and fats.  This information is not intended to replace advice given to you by your health care provider. Make sure you discuss any  questions you have with your health care provider.  Document Released: 06/18/2013 Document Revised: 01/25/2019 Document Reviewed: 01/25/2019  [employer] Interactive Patient Education © 2019 [employer]

## 2019-12-12 NOTE — PROGRESS NOTES
Chief Complaint   Patient presents with   • Follow-up     4 month follow up chronic medical problems       History of Present Illness      The patient presents for a follow-up related to Type 2 Diabetes Mellitus and reports that he doesn't check his blood sugars at home. He denies hypoglycemic symptoms. The patient denies polyuria, polydypsia or polyphagia. He reports no symptoms of neuropathy. The patient takes his medication as prescribed. He is not taking insulin. The patient does check his feet daily at home. He denies chest pain, shortness of breath, orthopnea, paroxysmal nocturnal dyspnea, dyspnea on exertion, edema, palpitations or syncope.    The patient presents for a follow-up related to GERD. The patient is on omeprazole for his gastroesophageal reflux. The medication is taken on a regular basis and gives complete relief of the symptoms. He reports no abdominal pain, belching, diarrhea, dysphagia, early satiety, heartburn, hoarseness, nausea, odynophagia, rectal bleeding, vomiting or weight loss. The GERD has no known aggravating factors.    The patient presents for a follow-up related to hyperlipidemia. He is following a low fat diet. He reports that he is exercising. He is taking pravastatin. The patient is taking his medication as prescribed. He reports no medication side effects, including muscle cramps, abdominal pain, headaches or weakness.    The patient presents for a follow-up related to hypertension. The patient reports that he has had no headaches or blurred vision. He states that he is taking his medication as prescribed. He is not having medication side effects.    Review of Systems    CONSTITUTIONAL- Denies Fever, Chills, Sweats, Fatigue, Weakness or Malaise.    PULMONARY- Denies Wheezing, Sputum Production, Cough, Hemoptysis or Pleuritic Chest Pain.    CARDIOVASCULAR- Denies Claudication or Irregular Heart Beat.    Medications      Current Outpatient Medications:   •  allopurinol (ZYLOPRIM)  300 MG tablet, TAKE 1 TABLET BY MOUTH ONCE DAILY, Disp: 90 tablet, Rfl: 1  •  amLODIPine (NORVASC) 10 MG tablet, TAKE 1 TABLET BY MOUTH ONCE DAILY, Disp: 90 tablet, Rfl: 1  •  apixaban (ELIQUIS) 5 MG tablet tablet, Take 1 tablet by mouth Every 12 (Twelve) Hours., Disp: 180 tablet, Rfl: 1  •  Ascorbic Acid (VITAMIN C) 500 MG capsule, Take 1 tablet by mouth 4 (four) times a day., Disp: , Rfl:   •  docusate sodium (COLACE) 100 MG capsule, Take 300 mg by mouth every night., Disp: , Rfl:   •  Ferrous Gluconate (IRON) 240 (27 FE) MG tablet, Take 1 tablet by mouth daily., Disp: , Rfl:   •  finasteride (PROSCAR) 5 MG tablet, TAKE 1 TABLET BY MOUTH AT NIGHT, Disp: 90 tablet, Rfl: 1  •  hydrochlorothiazide (MICROZIDE) 12.5 MG capsule, TAKE 1 CAPSULE BY MOUTH ONCE DAILY IN THE MORNING, Disp: 90 capsule, Rfl: 1  •  lisinopril (PRINIVIL,ZESTRIL) 40 MG tablet, TAKE 1 TABLET BY MOUTH ONCE DAILY, Disp: 90 tablet, Rfl: 3  •  metFORMIN (GLUCOPHAGE) 1000 MG tablet, TAKE 1 TABLET BY MOUTH TWICE DAILY, Disp: 180 tablet, Rfl: 1  •  methenamine (HIPREX) 1 g tablet, TAKE 1/2 (ONE-HALF) TABLET BY MOUTH TWICE DAILY WITH A MEAL, Disp: 90 tablet, Rfl: 1  •  omeprazole (priLOSEC) 20 MG capsule, TAKE 1 CAPSULE BY MOUTH ONCE DAILY, Disp: 90 capsule, Rfl: 1  •  pravastatin (PRAVACHOL) 40 MG tablet, TAKE 1 TABLET BY MOUTH ONCE DAILY, Disp: 90 tablet, Rfl: 3  •  Probiotic Product (PROBIOTIC ADVANCED PO), Take 1 tablet by mouth 2 (Two) Times a Day., Disp: , Rfl:   •  sotalol (BETAPACE) 120 MG tablet, Take 1 tablet by mouth 2 (Two) Times a Day., Disp: 180 tablet, Rfl: 1  •  tamsulosin (FLOMAX) 0.4 MG capsule 24 hr capsule, TAKE 1 CAPSULE BY MOUTH ONCE DAILY, Disp: 90 capsule, Rfl: 1     Allergies    Allergies   Allergen Reactions   • Penicillins Hives   • Xarelto [Rivaroxaban]      GI bleed       Problem List    Patient Active Problem List   Diagnosis   • Atopic rhinitis   • Benign (familial) paraproteinemia   • Type 2 diabetes mellitus, without  long-term current use of insulin (CMS/HCC)   • Enlarged prostate without lower urinary tract symptoms (luts)   • Gastroesophageal reflux disease with esophagitis   • Polyp of gallbladder   • Gout   • Hematuria   • Liver cyst   • Hyperlipidemia LDL goal <100   • Essential hypertension   • Nephrolithiasis   • Obstructive sleep apnea syndrome   • Persistent atrial fibrillation   • Stage 3 chronic kidney disease (CMS/HCC)   • Long term current use of antiarrhythmic medical therapy       Medications, Allergies, Problems List and Past History were reviewed and updated.    Physical Examination    /68 (BP Location: Left arm, Patient Position: Sitting, Cuff Size: Adult)   Pulse 60   Temp 98.1 °F (36.7 °C) (Temporal)   Resp 16   Wt 118 kg (260 lb)   BMI 32.50 kg/m²     HEENT: Facies- Within normal limits. Lids- Normal bilaterally. Conjunctiva- Clear bilaterally. Sclera- Anicteric bilaterally.    Neck: Thyroid- non enlarged, symmetric and has no nodules. No bruits are detected.    Lungs: Auscultation- Clear to auscultation bilaterally. There are no retractions, clubbing or cyanosis. The Expiratory to Inspiratory ratio is equal.    Cardiovascular: There are no carotid bruits. Heart- Normal Rate with Regular rhythm and no murmurs. There are no gallops. There are no rubs. In the lower extremities there is no edema. The upper extremities do not have edema.    Abdomen: Soft, benign, non-tender with no masses, hernias, organomegaly or scars.    Impression and Assessment    Type 2 Diabetes Mellitus without complication without insulin usage.    Gastroesophageal Reflux Disease.    Hyperlipidemia.    Essential Hypertension.    Plan    Gastroesophageal Reflux Disease Plan: The current plan was continued.    Hyperlipidemia Plan: The patient was instructed to exercise daily, eat a low fat diet and continue his medications.    Essential Hypertension Plan: The current plan was continued.    Type 2 Diabetes Mellitus without  complication without insulin usage Plan: The current plan was continued.    Darien was seen today for follow-up.    Diagnoses and all orders for this visit:    Type 2 diabetes mellitus without complication, without long-term current use of insulin (CMS/[facility])  -     Comprehensive Metabolic Panel  -     Hemoglobin A1c    Gastroesophageal reflux disease with esophagitis    Essential hypertension  -     Comprehensive Metabolic Panel    Hyperlipidemia, unspecified hyperlipidemia type  -     Comprehensive Metabolic Panel  -     Lipid Panel        Return to Office    The patient was instructed to return for follow-up in 4 months.    The patient was instructed to return sooner if the condition changes, worsens, or does not resolve.

## 2019-12-13 LAB
ALBUMIN SERPL-MCNC: 3.8 G/DL (ref 3.5–5.2)
ALBUMIN/GLOB SERPL: 1.1 G/DL
ALP SERPL-CCNC: 77 U/L (ref 39–117)
ALT SERPL W P-5'-P-CCNC: 19 U/L (ref 1–41)
ANION GAP SERPL CALCULATED.3IONS-SCNC: 8.9 MMOL/L (ref 5–15)
AST SERPL-CCNC: 18 U/L (ref 1–40)
BILIRUB SERPL-MCNC: 0.5 MG/DL (ref 0.2–1.2)
BUN BLD-MCNC: 23 MG/DL (ref 8–23)
BUN/CREAT SERPL: 25.6 (ref 7–25)
CALCIUM SPEC-SCNC: 9.2 MG/DL (ref 8.6–10.5)
CHLORIDE SERPL-SCNC: 102 MMOL/L (ref 98–107)
CHOLEST SERPL-MCNC: 112 MG/DL (ref 0–200)
CO2 SERPL-SCNC: 27.1 MMOL/L (ref 22–29)
CREAT BLD-MCNC: 0.9 MG/DL (ref 0.76–1.27)
GFR SERPL CREATININE-BSD FRML MDRD: 81 ML/MIN/1.73
GLOBULIN UR ELPH-MCNC: 3.5 GM/DL
GLUCOSE BLD-MCNC: 137 MG/DL (ref 65–99)
HBA1C MFR BLD: 6.47 % (ref 4.8–5.6)
HDLC SERPL-MCNC: 29 MG/DL (ref 40–60)
LDLC SERPL CALC-MCNC: 38 MG/DL (ref 0–100)
LDLC/HDLC SERPL: 1.32 {RATIO}
POTASSIUM BLD-SCNC: 4.1 MMOL/L (ref 3.5–5.2)
PROT SERPL-MCNC: 7.3 G/DL (ref 6–8.5)
SODIUM BLD-SCNC: 138 MMOL/L (ref 136–145)
TRIGL SERPL-MCNC: 224 MG/DL (ref 0–150)
VLDLC SERPL-MCNC: 44.8 MG/DL (ref 5–40)

## 2019-12-28 DIAGNOSIS — I10 ESSENTIAL HYPERTENSION: Chronic | ICD-10-CM

## 2019-12-30 RX ORDER — AMLODIPINE BESYLATE 10 MG/1
TABLET ORAL
Qty: 90 TABLET | Refills: 3 | Status: SHIPPED | OUTPATIENT
Start: 2019-12-30 | End: 2020-12-21

## 2020-01-04 DIAGNOSIS — I10 ESSENTIAL HYPERTENSION: Chronic | ICD-10-CM

## 2020-01-06 RX ORDER — HYDROCHLOROTHIAZIDE 12.5 MG/1
CAPSULE, GELATIN COATED ORAL
Qty: 90 CAPSULE | Refills: 3 | Status: SHIPPED | OUTPATIENT
Start: 2020-01-06 | End: 2020-12-21

## 2020-01-06 RX ORDER — FINASTERIDE 5 MG/1
TABLET, FILM COATED ORAL
Qty: 90 TABLET | Refills: 1 | Status: SHIPPED | OUTPATIENT
Start: 2020-01-06 | End: 2020-07-05

## 2020-01-20 RX ORDER — METHENAMINE HIPPURATE 1000 MG/1
TABLET ORAL
Qty: 90 TABLET | Refills: 0 | Status: SHIPPED | OUTPATIENT
Start: 2020-01-20 | End: 2020-04-27

## 2020-04-13 RX ORDER — ALLOPURINOL 300 MG/1
TABLET ORAL
Qty: 90 TABLET | Refills: 0 | Status: SHIPPED | OUTPATIENT
Start: 2020-04-13 | End: 2020-07-05

## 2020-04-16 ENCOUNTER — OFFICE VISIT (OUTPATIENT)
Dept: INTERNAL MEDICINE | Facility: CLINIC | Age: 85
End: 2020-04-16

## 2020-04-16 VITALS — HEART RATE: 64 BPM | DIASTOLIC BLOOD PRESSURE: 64 MMHG | SYSTOLIC BLOOD PRESSURE: 116 MMHG

## 2020-04-16 DIAGNOSIS — I10 ESSENTIAL HYPERTENSION: ICD-10-CM

## 2020-04-16 DIAGNOSIS — E11.9 TYPE 2 DIABETES MELLITUS WITHOUT COMPLICATION, WITHOUT LONG-TERM CURRENT USE OF INSULIN (HCC): Primary | ICD-10-CM

## 2020-04-16 DIAGNOSIS — K21.00 GASTROESOPHAGEAL REFLUX DISEASE WITH ESOPHAGITIS: ICD-10-CM

## 2020-04-16 DIAGNOSIS — E78.5 HYPERLIPIDEMIA, UNSPECIFIED HYPERLIPIDEMIA TYPE: ICD-10-CM

## 2020-04-16 PROCEDURE — 99443 PR PHYS/QHP TELEPHONE EVALUATION 21-30 MIN: CPT | Performed by: INTERNAL MEDICINE

## 2020-04-16 NOTE — PROGRESS NOTES
Chief Complaint   Patient presents with   • Follow-up     You have chosen to receive care through a telephone visit today. Do you consent to use a telephone visit for your medical care today? Yes    This was an audio enabled telemedicine encounter.  Total time of encounter: 22 minutes.      History of Present Illness    The patient presents for a follow-up related to Type 2 Diabetes Mellitus and reports that he doesn't check his blood sugars at home. He denies hypoglycemic symptoms. The patient denies polyuria, polydypsia or polyphagia. He reports no symptoms of neuropathy. The patient takes his medication as prescribed. He is not taking insulin. The patient does check his feet daily at home. He denies chest pain, shortness of breath, orthopnea, paroxysmal nocturnal dyspnea, dyspnea on exertion, edema, palpitations or syncope.    The patient presents for a follow-up related to GERD. The patient is on omeprazole for his gastroesophageal reflux. The medication is taken on a regular basis and gives complete relief of the symptoms. He reports no abdominal pain, belching, diarrhea, dysphagia, early satiety, heartburn, hoarseness, nausea, odynophagia, rectal bleeding, vomiting or weight loss. The GERD has no known aggravating factors.    The patient presents for a follow-up related to hypertension. The patient reports that he has had no headaches or blurred vision. He states that he is taking his medication as prescribed. He is not having medication side effects. He checks his blood pressures at home. The home readings are normal.    The patient presents for a follow-up related to hyperlipidemia. He is following a low fat diet. He reports that he is exercising. He is taking pravastatin. The patient is taking his medication as prescribed. He reports no medication side effects, including muscle cramps, abdominal pain, headaches or weakness.    Review of Systems    CONSTITUTIONAL- Denies Fever, Chills, Sweats, Fatigue,  Weakness or Malaise.    PULMONARY- Denies Wheezing, Sputum Production, Cough, Hemoptysis or Pleuritic Chest Pain.    GASTROINTESTINAL- Denies Constipation.    CARDIOVASCULAR- Denies Claudication or Irregular Heart Beat.    Medications      Current Outpatient Medications:   •  allopurinol (ZYLOPRIM) 300 MG tablet, Take 1 tablet by mouth once daily, Disp: 90 tablet, Rfl: 0  •  amLODIPine (NORVASC) 10 MG tablet, TAKE 1 TABLET BY MOUTH ONCE DAILY, Disp: 90 tablet, Rfl: 3  •  apixaban (ELIQUIS) 5 MG tablet tablet, Take 1 tablet by mouth Every 12 (Twelve) Hours., Disp: 180 tablet, Rfl: 1  •  Ascorbic Acid (VITAMIN C) 500 MG capsule, Take 1 tablet by mouth 4 (four) times a day., Disp: , Rfl:   •  docusate sodium (COLACE) 100 MG capsule, Take 300 mg by mouth every night., Disp: , Rfl:   •  Ferrous Gluconate (IRON) 240 (27 FE) MG tablet, Take 1 tablet by mouth daily., Disp: , Rfl:   •  finasteride (PROSCAR) 5 MG tablet, TAKE 1 TABLET BY MOUTH AT NIGHT, Disp: 90 tablet, Rfl: 1  •  hydroCHLOROthiazide (MICROZIDE) 12.5 MG capsule, TAKE 1 CAPSULE BY MOUTH ONCE DAILY IN THE MORNING, Disp: 90 capsule, Rfl: 3  •  lisinopril (PRINIVIL,ZESTRIL) 40 MG tablet, TAKE 1 TABLET BY MOUTH ONCE DAILY, Disp: 90 tablet, Rfl: 3  •  metFORMIN (GLUCOPHAGE) 1000 MG tablet, TAKE 1 TABLET BY MOUTH TWICE DAILY, Disp: 180 tablet, Rfl: 0  •  methenamine (HIPREX) 1 g tablet, TAKE 1/2 (ONE-HALF) TABLET BY MOUTH TWICE DAILY WITH A MEAL, Disp: 90 tablet, Rfl: 0  •  omeprazole (priLOSEC) 20 MG capsule, TAKE 1 CAPSULE BY MOUTH ONCE DAILY, Disp: 90 capsule, Rfl: 1  •  pravastatin (PRAVACHOL) 40 MG tablet, TAKE 1 TABLET BY MOUTH ONCE DAILY, Disp: 90 tablet, Rfl: 3  •  Probiotic Product (PROBIOTIC ADVANCED PO), Take 1 tablet by mouth 2 (Two) Times a Day., Disp: , Rfl:   •  sotalol (BETAPACE) 120 MG tablet, Take 1 tablet by mouth 2 (Two) Times a Day., Disp: 180 tablet, Rfl: 1  •  tamsulosin (FLOMAX) 0.4 MG capsule 24 hr capsule, TAKE 1 CAPSULE BY MOUTH ONCE  DAILY, Disp: 90 capsule, Rfl: 1     Allergies    Allergies   Allergen Reactions   • Penicillins Hives   • Xarelto [Rivaroxaban]      GI bleed       Problem List    Patient Active Problem List   Diagnosis   • Atopic rhinitis   • Benign (familial) paraproteinemia   • Type 2 diabetes mellitus, without long-term current use of insulin (CMS/HCC)   • Enlarged prostate without lower urinary tract symptoms (luts)   • Gastroesophageal reflux disease with esophagitis   • Polyp of gallbladder   • Gout   • Hematuria   • Liver cyst   • Hyperlipidemia LDL goal <100   • Essential hypertension   • Nephrolithiasis   • Obstructive sleep apnea syndrome   • Persistent atrial fibrillation   • Stage 3 chronic kidney disease (CMS/HCC)   • Long term current use of antiarrhythmic medical therapy       Medications, Allergies, Problems List and Past History were reviewed and updated.    Physical Examination    /64   Pulse 64     Impression and Assessment    Type 2 Diabetes Mellitus without complication without insulin usage.    Gastroesophageal Reflux Disease.    Essential Hypertension.    Hyperlipidemia.    Plan    Gastroesophageal Reflux Disease Plan: The current plan was continued.    Essential Hypertension Plan: The current plan was continued.    Hyperlipidemia Plan: The patient was instructed to exercise daily, eat a low fat diet and continue his medications.    Type 2 Diabetes Mellitus without complication without insulin usage Plan: The current plan was continued.    Darien was seen today for follow-up.    Diagnoses and all orders for this visit:    Type 2 diabetes mellitus without complication, without long-term current use of insulin (CMS/HCC)  -     Comprehensive Metabolic Panel; Future  -     Hemoglobin A1c; Future    Gastroesophageal reflux disease with esophagitis    Essential hypertension  -     Comprehensive Metabolic Panel; Future    Hyperlipidemia, unspecified hyperlipidemia type  -     Comprehensive Metabolic Panel;  Future  -     Lipid Panel; Future        Return to Office    The patient was instructed to return for follow-up in 4 months.    The patient was instructed to return sooner if the condition changes, worsens, or does not resolve.

## 2020-04-27 RX ORDER — METHENAMINE HIPPURATE 1000 MG/1
TABLET ORAL
Qty: 90 TABLET | Refills: 1 | Status: SHIPPED | OUTPATIENT
Start: 2020-04-27 | End: 2020-10-12

## 2020-05-26 RX ORDER — TAMSULOSIN HYDROCHLORIDE 0.4 MG/1
CAPSULE ORAL
Qty: 90 CAPSULE | Refills: 0 | Status: SHIPPED | OUTPATIENT
Start: 2020-05-26 | End: 2020-08-15

## 2020-05-26 RX ORDER — OMEPRAZOLE 20 MG/1
CAPSULE, DELAYED RELEASE ORAL
Qty: 90 CAPSULE | Refills: 0 | Status: SHIPPED | OUTPATIENT
Start: 2020-05-26 | End: 2020-08-15

## 2020-05-28 ENCOUNTER — TELEPHONE (OUTPATIENT)
Dept: CARDIOLOGY | Facility: CLINIC | Age: 85
End: 2020-05-28

## 2020-05-28 DIAGNOSIS — I48.19 PERSISTENT ATRIAL FIBRILLATION (HCC): ICD-10-CM

## 2020-05-28 NOTE — TELEPHONE ENCOUNTER
Patient called for Eliquis samples. A few boxes left up front for him. He will bring in his OOP expenses for his medications this year and I will submit to patient assistance.

## 2020-06-06 DIAGNOSIS — I48.19 PERSISTENT ATRIAL FIBRILLATION (HCC): ICD-10-CM

## 2020-06-08 RX ORDER — SOTALOL HYDROCHLORIDE 120 MG/1
TABLET ORAL
Qty: 180 TABLET | Refills: 0 | Status: SHIPPED | OUTPATIENT
Start: 2020-06-08 | End: 2020-06-23 | Stop reason: SDUPTHER

## 2020-06-23 ENCOUNTER — OFFICE VISIT (OUTPATIENT)
Dept: CARDIOLOGY | Facility: CLINIC | Age: 85
End: 2020-06-23

## 2020-06-23 DIAGNOSIS — I48.0 PAROXYSMAL ATRIAL FIBRILLATION (HCC): Primary | ICD-10-CM

## 2020-06-23 DIAGNOSIS — E78.5 HYPERLIPIDEMIA LDL GOAL <100: ICD-10-CM

## 2020-06-23 DIAGNOSIS — I10 ESSENTIAL HYPERTENSION: ICD-10-CM

## 2020-06-23 PROCEDURE — 99214 OFFICE O/P EST MOD 30 MIN: CPT | Performed by: INTERNAL MEDICINE

## 2020-06-23 PROCEDURE — 93000 ELECTROCARDIOGRAM COMPLETE: CPT | Performed by: INTERNAL MEDICINE

## 2020-06-23 RX ORDER — SOTALOL HYDROCHLORIDE 120 MG/1
120 TABLET ORAL 2 TIMES DAILY
Qty: 180 TABLET | Refills: 1 | Status: SHIPPED | OUTPATIENT
Start: 2020-06-23 | End: 2021-02-15

## 2020-06-23 NOTE — ASSESSMENT & PLAN NOTE
· Mildly symptomatic  · EKG today with acceptable QTc interval in sinus rhythm  · Continue sotalol  · Continue Eliquis for stroke prophylaxis

## 2020-06-23 NOTE — PROGRESS NOTES
[facility] at [facility]  Office Visit Note    DATE: 06/23/2020    IDENTIFICATION: Darien Camarena is a 84 y.o. male who resides in [place].    REASON FOR VISIT:  • Atrial fibrillation  • Hypertension            Darien Camarena returns to the office today in follow-up of his paroxysmal atrial fibrillation and cardiac risk factors.  ENC has been doing well from a cardiovascular standpoint.  He reports occasional and mild palpitation symptoms which are short-lived.  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  He still enjoys fishing on his days off.  He denies TIA or stroke symptoms.    Review of Systems   Constitution: Negative for malaise/fatigue.   Eyes: Negative for vision loss in left eye and vision loss in right eye.   Cardiovascular: Negative for chest pain, dyspnea on exertion, near-syncope, orthopnea, palpitations, paroxysmal nocturnal dyspnea and syncope.   Musculoskeletal: Negative for myalgias.   Neurological: Negative for brief paralysis, excessive daytime sleepiness, focal weakness, numbness, paresthesias and weakness.   All other systems reviewed and are negative.      The patient's past medical, social, family history and ROS reviewed in the patient's electronic medical record.    Allergies   Allergen Reactions   • Penicillins Hives   • Xarelto [Rivaroxaban]      GI bleed         Current Outpatient Medications:   •  allopurinol (ZYLOPRIM) 300 MG tablet, Take 1 tablet by mouth once daily, Disp: 90 tablet, Rfl: 0  •  amLODIPine (NORVASC) 10 MG tablet, TAKE 1 TABLET BY MOUTH ONCE DAILY, Disp: 90 tablet, Rfl: 3  •  apixaban (Eliquis) 5 MG tablet tablet, Take 1 tablet by mouth Every 12 (Twelve) Hours., Disp: 180 tablet, Rfl: 1  •  Ascorbic Acid (VITAMIN C) 500 MG capsule, Take 1 tablet by mouth 4 (four) times a day., Disp: , Rfl:   •  docusate sodium (COLACE) 100 MG capsule, Take 300 mg by mouth every night., Disp: , Rfl:   •  Ferrous Gluconate (IRON) 240  (27 FE) MG tablet, Take 1 tablet by mouth daily., Disp: , Rfl:   •  finasteride (PROSCAR) 5 MG tablet, TAKE 1 TABLET BY MOUTH AT NIGHT, Disp: 90 tablet, Rfl: 1  •  hydroCHLOROthiazide (MICROZIDE) 12.5 MG capsule, TAKE 1 CAPSULE BY MOUTH ONCE DAILY IN THE MORNING, Disp: 90 capsule, Rfl: 3  •  lisinopril (PRINIVIL,ZESTRIL) 40 MG tablet, TAKE 1 TABLET BY MOUTH ONCE DAILY, Disp: 90 tablet, Rfl: 3  •  metFORMIN (GLUCOPHAGE) 1000 MG tablet, Take 1 tablet by mouth twice daily, Disp: 180 tablet, Rfl: 1  •  methenamine (HIPREX) 1 g tablet, TAKE 1/2 (ONE-HALF) TABLET BY MOUTH TWICE DAILY WITH A MEAL, Disp: 90 tablet, Rfl: 1  •  omeprazole (priLOSEC) 20 MG capsule, Take 1 capsule by mouth once daily, Disp: 90 capsule, Rfl: 0  •  pravastatin (PRAVACHOL) 40 MG tablet, TAKE 1 TABLET BY MOUTH ONCE DAILY, Disp: 90 tablet, Rfl: 3  •  Probiotic Product (PROBIOTIC ADVANCED PO), Take 1 tablet by mouth 2 (Two) Times a Day., Disp: , Rfl:   •  sotalol (BETAPACE) 120 MG tablet, Take 1 tablet by mouth 2 (Two) Times a Day., Disp: 180 tablet, Rfl: 1  •  tamsulosin (FLOMAX) 0.4 MG capsule 24 hr capsule, Take 1 capsule by mouth once daily, Disp: 90 capsule, Rfl: 0    Past Medical History:   Diagnosis Date   • Atrial fibrillation (CMS/HCC)    • Chronic kidney disease    • Diabetes mellitus (CMS/HCC)    • Gout    • History of colonoscopy 2012   • Hyperlipidemia    • Hypertension    • PAF (paroxysmal atrial fibrillation) (CMS/HCC)    • Prostatism    • Sleep apnea     CPAP HS       Past Surgical History:   Procedure Laterality Date   • CARDIOVERSION     • CATARACT EXTRACTION Left    • KIDNEY STONE SURGERY         Family History   Problem Relation Age of Onset   • Alzheimer's disease Mother    • Cancer Father    • COPD Father        Social History     Tobacco Use   • Smoking status: Former Smoker     Last attempt to quit: 1960     Years since quittin.1   • Smokeless tobacco: Never Used   • Tobacco comment: started at 15 yo.    Substance Use Topics   • Alcohol use: No           There were no vitals taken for this visit.  There is no height or weight on file to calculate BMI.  There were no vitals filed for this visit.    Physical Exam   Constitutional: He is oriented to person, place, and time. He appears well-developed and well-nourished.   HENT:   Head: Normocephalic and atraumatic.   Eyes: Pupils are equal, round, and reactive to light. No scleral icterus.   Neck: No JVD present. Carotid bruit is not present. No thyromegaly present.   Cardiovascular: Normal rate, regular rhythm, S1 normal and S2 normal. Exam reveals no gallop.   No murmur heard.  Pulmonary/Chest: Effort normal and breath sounds normal.   Abdominal: Soft. He exhibits no mass. There is no hepatosplenomegaly. There is no tenderness.   Neurological: He is alert and oriented to person, place, and time.   Skin: Skin is warm and dry. No cyanosis. Nails show no clubbing.   Psychiatric: He has a normal mood and affect. His behavior is normal.       Data Review (reviewed with patient):       ECG 12 Lead  Date/Time: 6/23/2020 5:32 PM  Performed by: Titus Oliveros IV, MD  Authorized by: Titus Oliveros IV, MD   Comparison: compared with previous ECG from 11/26/2019  Similar to previous ECG  Rhythm: sinus rhythm  Ectopy: atrial premature contractions  BPM: 79              Lab Results   Component Value Date    GLUCOSE 137 (H) 12/12/2019    BUN 23 12/12/2019    CREATININE 0.90 12/12/2019    EGFRIFNONA 81 12/12/2019    BCR 25.6 (H) 12/12/2019    K 4.1 12/12/2019    CO2 27.1 12/12/2019    CALCIUM 9.2 12/12/2019    ALBUMIN 3.80 12/12/2019    ALKPHOS 77 12/12/2019    AST 18 12/12/2019    ALT 19 12/12/2019      Lab Results   Component Value Date    CHOL 112 12/12/2019    TRIG 224 (H) 12/12/2019    HDL 29 (L) 12/12/2019    LDL 38 12/12/2019     Lab Results   Component Value Date    HGBA1C 6.47 (H) 12/12/2019     Lab Results   Component Value Date    WBC 8.14  10/31/2019    HGB 15.1 10/31/2019    HCT 45.9 10/31/2019    MCV 94.8 10/31/2019     10/31/2019     Lab Results   Component Value Date    TSH 1.560 10/31/2019            Problem List Items Addressed This Visit        Cardiology Problems    Paroxysmal atrial fibrillation (CMS/HCC) - Primary    Overview     · Diagnosed August 2012.   · FARHAD-guided ECV (08/17/2012).  · CHADS-VASc 5 (age > 75, CAD, HTN, DM)  · Successful external cardioversion for asymptomatic atrial fibrillation with RVR, 10/25/18  · Successful cardioversion for atrial fibrillation RVR, 3/6/19.  Sotalol increased  · Clinic visit 4/9/2019 maintaining NSR  · Minimally symptomatic atrial fibrillation with cardioversion and the ER, 10/31/2019         Current Assessment & Plan     · Mildly symptomatic  · EKG today with acceptable QTc interval in sinus rhythm  · Continue sotalol  · Continue Eliquis for stroke prophylaxis         Relevant Medications    sotalol (BETAPACE) 120 MG tablet    apixaban (Eliquis) 5 MG tablet tablet    Essential hypertension    Overview     • Target blood pressure <130/80 mmHg         Current Assessment & Plan     · Controlled  · Continue present medical therapy         Relevant Medications    sotalol (BETAPACE) 120 MG tablet    Hyperlipidemia LDL goal <100    Overview     · Moderate intensity statin therapy reasonable due to diabetic status         Current Assessment & Plan     · Continue pravastatin for primary prevention                    · Continue present medical therapy  Return in about 6 months (around 12/23/2020).  With EKG    Titus Oliveros IV MD, FACC, [facility]    6/23/2020

## 2020-07-05 RX ORDER — FINASTERIDE 5 MG/1
TABLET, FILM COATED ORAL
Qty: 90 TABLET | Refills: 0 | Status: SHIPPED | OUTPATIENT
Start: 2020-07-05 | End: 2020-09-26

## 2020-07-05 RX ORDER — ALLOPURINOL 300 MG/1
TABLET ORAL
Qty: 90 TABLET | Refills: 0 | Status: SHIPPED | OUTPATIENT
Start: 2020-07-05 | End: 2020-10-12

## 2020-07-21 ENCOUNTER — EPISODE CHANGES (OUTPATIENT)
Dept: CASE MANAGEMENT | Facility: OTHER | Age: 85
End: 2020-07-21

## 2020-07-21 ENCOUNTER — HOSPITAL ENCOUNTER (EMERGENCY)
Facility: HOSPITAL | Age: 85
Discharge: HOME | End: 2020-07-21
Attending: EMERGENCY MEDICINE | Admitting: EMERGENCY MEDICINE

## 2020-07-21 ENCOUNTER — APPOINTMENT (OUTPATIENT)
Dept: GENERAL RADIOLOGY | Facility: HOSPITAL | Age: 85
End: 2020-07-21

## 2020-07-21 VITALS
OXYGEN SATURATION: 95 % | SYSTOLIC BLOOD PRESSURE: 111 MMHG | WEIGHT: 256 LBS | DIASTOLIC BLOOD PRESSURE: 69 MMHG | TEMPERATURE: 97.5 F | RESPIRATION RATE: 16 BRPM | BODY MASS INDEX: 31.83 KG/M2 | HEART RATE: 69 BPM | HEIGHT: 75 IN

## 2020-07-21 DIAGNOSIS — Z79.01 ANTICOAGULATED: ICD-10-CM

## 2020-07-21 DIAGNOSIS — I48.91 ATRIAL FIBRILLATION, UNSPECIFIED TYPE (HCC): Primary | ICD-10-CM

## 2020-07-21 LAB
ALBUMIN SERPL-MCNC: 3.8 G/DL (ref 3.5–5.2)
ALBUMIN/GLOB SERPL: 1.1 G/DL
ALP SERPL-CCNC: 87 U/L (ref 39–117)
ALT SERPL W P-5'-P-CCNC: 21 U/L (ref 1–41)
ANION GAP SERPL CALCULATED.3IONS-SCNC: 14 MMOL/L (ref 5–15)
AST SERPL-CCNC: 18 U/L (ref 1–40)
BASOPHILS # BLD AUTO: 0.06 10*3/MM3 (ref 0–0.2)
BASOPHILS NFR BLD AUTO: 0.6 % (ref 0–1.5)
BILIRUB SERPL-MCNC: 1 MG/DL (ref 0–1.2)
BUN SERPL-MCNC: 28 MG/DL (ref 8–23)
BUN/CREAT SERPL: 20.4 (ref 7–25)
CALCIUM SPEC-SCNC: 9.2 MG/DL (ref 8.6–10.5)
CHLORIDE SERPL-SCNC: 105 MMOL/L (ref 98–107)
CO2 SERPL-SCNC: 23 MMOL/L (ref 22–29)
CREAT SERPL-MCNC: 1.37 MG/DL (ref 0.76–1.27)
DEPRECATED RDW RBC AUTO: 44.8 FL (ref 37–54)
EOSINOPHIL # BLD AUTO: 0.31 10*3/MM3 (ref 0–0.4)
EOSINOPHIL NFR BLD AUTO: 3.3 % (ref 0.3–6.2)
ERYTHROCYTE [DISTWIDTH] IN BLOOD BY AUTOMATED COUNT: 13.1 % (ref 12.3–15.4)
GFR SERPL CREATININE-BSD FRML MDRD: 50 ML/MIN/1.73
GLOBULIN UR ELPH-MCNC: 3.4 GM/DL
GLUCOSE SERPL-MCNC: 132 MG/DL (ref 65–99)
HCT VFR BLD AUTO: 45.8 % (ref 37.5–51)
HGB BLD-MCNC: 15.2 G/DL (ref 13–17.7)
HOLD SPECIMEN: NORMAL
HOLD SPECIMEN: NORMAL
IMM GRANULOCYTES # BLD AUTO: 0.05 10*3/MM3 (ref 0–0.05)
IMM GRANULOCYTES NFR BLD AUTO: 0.5 % (ref 0–0.5)
LYMPHOCYTES # BLD AUTO: 2.13 10*3/MM3 (ref 0.7–3.1)
LYMPHOCYTES NFR BLD AUTO: 22.4 % (ref 19.6–45.3)
MAGNESIUM SERPL-MCNC: 1.8 MG/DL (ref 1.6–2.4)
MCH RBC QN AUTO: 31.3 PG (ref 26.6–33)
MCHC RBC AUTO-ENTMCNC: 33.2 G/DL (ref 31.5–35.7)
MCV RBC AUTO: 94.2 FL (ref 79–97)
MONOCYTES # BLD AUTO: 0.73 10*3/MM3 (ref 0.1–0.9)
MONOCYTES NFR BLD AUTO: 7.7 % (ref 5–12)
NEUTROPHILS NFR BLD AUTO: 6.23 10*3/MM3 (ref 1.7–7)
NEUTROPHILS NFR BLD AUTO: 65.5 % (ref 42.7–76)
NRBC BLD AUTO-RTO: 0 /100 WBC (ref 0–0.2)
NT-PROBNP SERPL-MCNC: 1675 PG/ML (ref 0–1800)
PLATELET # BLD AUTO: 207 10*3/MM3 (ref 140–450)
PMV BLD AUTO: 10.3 FL (ref 6–12)
POTASSIUM SERPL-SCNC: 4.1 MMOL/L (ref 3.5–5.2)
PROT SERPL-MCNC: 7.2 G/DL (ref 6–8.5)
RBC # BLD AUTO: 4.86 10*6/MM3 (ref 4.14–5.8)
SODIUM SERPL-SCNC: 142 MMOL/L (ref 136–145)
TROPONIN T SERPL-MCNC: <0.01 NG/ML (ref 0–0.03)
TSH SERPL DL<=0.05 MIU/L-ACNC: 1.92 UIU/ML (ref 0.27–4.2)
WBC # BLD AUTO: 9.51 10*3/MM3 (ref 3.4–10.8)
WHOLE BLOOD HOLD SPECIMEN: NORMAL
WHOLE BLOOD HOLD SPECIMEN: NORMAL

## 2020-07-21 PROCEDURE — 84443 ASSAY THYROID STIM HORMONE: CPT | Performed by: EMERGENCY MEDICINE

## 2020-07-21 PROCEDURE — 93005 ELECTROCARDIOGRAM TRACING: CPT

## 2020-07-21 PROCEDURE — 83880 ASSAY OF NATRIURETIC PEPTIDE: CPT | Performed by: EMERGENCY MEDICINE

## 2020-07-21 PROCEDURE — 99285 EMERGENCY DEPT VISIT HI MDM: CPT

## 2020-07-21 PROCEDURE — 93005 ELECTROCARDIOGRAM TRACING: CPT | Performed by: EMERGENCY MEDICINE

## 2020-07-21 PROCEDURE — 85025 COMPLETE CBC W/AUTO DIFF WBC: CPT

## 2020-07-21 PROCEDURE — 92960 CARDIOVERSION ELECTRIC EXT: CPT

## 2020-07-21 PROCEDURE — 83735 ASSAY OF MAGNESIUM: CPT | Performed by: EMERGENCY MEDICINE

## 2020-07-21 PROCEDURE — 84484 ASSAY OF TROPONIN QUANT: CPT | Performed by: EMERGENCY MEDICINE

## 2020-07-21 PROCEDURE — 80053 COMPREHEN METABOLIC PANEL: CPT | Performed by: EMERGENCY MEDICINE

## 2020-07-21 RX ORDER — SODIUM CHLORIDE 0.9 % (FLUSH) 0.9 %
10 SYRINGE (ML) INJECTION AS NEEDED
Status: DISCONTINUED | OUTPATIENT
Start: 2020-07-21 | End: 2020-07-21 | Stop reason: HOSPADM

## 2020-07-21 RX ADMIN — METHOHEXITAL SODIUM 100 MG: 500 INJECTION, POWDER, LYOPHILIZED, FOR SOLUTION INTRAMUSCULAR; INTRAVENOUS; RECTAL at 15:31

## 2020-07-21 NOTE — ED PROVIDER NOTES
EMERGENCY DEPARTMENT ENCOUNTER    Room Number:  16/16  Date of encounter:  7/21/2020  PCP: Pito Way MD  Historian: Patient      HPI:  Chief Complaint: Irregular heartbeat atrial fibrillation    A complete HPI/ROS/PMH/PSH/SH/FH are unobtainable due to: N/A    Context: Darien Camarena is a 84 y.o. male who presents to the ED c/o his heart flipping out of rate or rhythm yesterday.  Patient has a history of atrial fibrillation is on chronic Eliquis.  States he is not missed any doses.  States yesterday afternoon he felt his heart flip back into atrial fibrillation.  He has had to be cardioverted twice in the past most recently in November.  He denies chest pain shortness of breath.  He had no cough no fevers no upper respiratory symptoms.  Denies loss of smell or taste.  He has had no pedal edema no weight gain.  He actually went and worked all day today and then decided to come in wanted to get cardioverted if he could.  He sees Dr. Oliveros is his cardiologist.      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   • Type 2 diabetes mellitus, without long-term current use of insulin (CMS/HCC) 06/20/2016   • Enlarged prostate without lower urinary tract symptoms (luts) 06/20/2016   • Gastroesophageal reflux disease with esophagitis 06/20/2016   • Polyp of gallbladder 06/20/2016   • Gout 06/20/2016   • Hematuria 06/20/2016   • Liver cyst 06/20/2016   • Hyperlipidemia LDL goal <100 06/20/2016   • Essential hypertension 06/20/2016   • Nephrolithiasis 06/20/2016   • Obstructive sleep apnea syndrome 06/20/2016   • Paroxysmal atrial fibrillation (CMS/HCC) 08/16/2016   • Stage 3 chronic kidney disease (CMS/HCC) 07/20/2017   • Long term current use of antiarrhythmic medical therapy 05/28/2019     Resolved Ambulatory Problems     Diagnosis Date Noted   • Arrhythmia 06/20/2016   • Ingrowing toenail 10/21/2016    • Atrial fibrillation with RVR (CMS/[facility]) 10/25/2018   • Atrial fibrillation (CMS/[facility]) 2019   • Atrial fibrillation (CMS/[facility]) 2019     Past Medical History:   Diagnosis Date   • Chronic kidney disease    • Diabetes mellitus (CMS/[facility])    • History of colonoscopy 2012   • Hyperlipidemia    • Hypertension    • PAF (paroxysmal atrial fibrillation) (CMS/[facility])    • Prostatism    • Sleep apnea          PAST SURGICAL HISTORY  Past Surgical History:   Procedure Laterality Date   • CARDIOVERSION     • CATARACT EXTRACTION Left    • KIDNEY STONE SURGERY           FAMILY HISTORY  Family History   Problem Relation Age of Onset   • Alzheimer's disease Mother    • Cancer Father    • COPD Father          SOCIAL HISTORY  Social History     Socioeconomic History   • Marital status:      Spouse name: Not on file   • Number of children: Not on file   • Years of education: Not on file   • Highest education level: Not on file   Tobacco Use   • Smoking status: Former Smoker     Last attempt to quit: 1960     Years since quittin.2   • Smokeless tobacco: Never Used   • Tobacco comment: started at 13 yo.   Substance and Sexual Activity   • Alcohol use: No   • Drug use: No   • Sexual activity: Defer         ALLERGIES  Penicillins and Xarelto [rivaroxaban]        REVIEW OF SYSTEMS  Review of Systems     All systems reviewed and negative except for those discussed in HPI.       PHYSICAL EXAM    I have reviewed the triage vital signs and nursing notes.    ED Triage Vitals   Temp Heart Rate Resp BP SpO2   20 1401 20 1404 20 1404 20 1404 20 1404   97.5 °F (36.4 °C) 105 16 109/81 95 %      Temp src Heart Rate Source Patient Position BP Location FiO2 (%)   -- 20 1404 20 1404 20 1404 --    Monitor Sitting Left arm        Physical Exam  GENERAL: Warm pink dry smiling laughing nontoxic well developed.  Appears in no acute distress.  HENT: Nares patent  EYES: No scleral  icterus  CV: Irregularly irregular and tachycardic no rubs clicks gallops murmurs S3s or S4s are noted.  RESPIRATORY: Normal effort.  No audible wheezes, rales or rhonchi  ABDOMEN: Soft, nontender  MUSCULOSKELETAL: No deformities.   NEURO: Alert, moves all extremities, follows commands  SKIN: Warm, dry, no rash visualized        LAB RESULTS  Recent Results (from the past 24 hour(s))   Comprehensive Metabolic Panel    Collection Time: 07/21/20  2:15 PM   Result Value Ref Range    Glucose 132 (H) 65 - 99 mg/dL    BUN 28 (H) 8 - 23 mg/dL    Creatinine 1.37 (H) 0.76 - 1.27 mg/dL    Sodium 142 136 - 145 mmol/L    Potassium 4.1 3.5 - 5.2 mmol/L    Chloride 105 98 - 107 mmol/L    CO2 23.0 22.0 - 29.0 mmol/L    Calcium 9.2 8.6 - 10.5 mg/dL    Total Protein 7.2 6.0 - 8.5 g/dL    Albumin 3.80 3.50 - 5.20 g/dL    ALT (SGPT) 21 1 - 41 U/L    AST (SGOT) 18 1 - 40 U/L    Alkaline Phosphatase 87 39 - 117 U/L    Total Bilirubin 1.0 0.0 - 1.2 mg/dL    eGFR Non African Amer 50 (L) >60 mL/min/1.73    Globulin 3.4 gm/dL    A/G Ratio 1.1 g/dL    BUN/Creatinine Ratio 20.4 7.0 - 25.0    Anion Gap 14.0 5.0 - 15.0 mmol/L   Magnesium    Collection Time: 07/21/20  2:15 PM   Result Value Ref Range    Magnesium 1.8 1.6 - 2.4 mg/dL   Troponin    Collection Time: 07/21/20  2:15 PM   Result Value Ref Range    Troponin T <0.010 0.000 - 0.030 ng/mL   TSH    Collection Time: 07/21/20  2:15 PM   Result Value Ref Range    TSH 1.920 0.270 - 4.200 uIU/mL   BNP    Collection Time: 07/21/20  2:15 PM   Result Value Ref Range    proBNP 1,675.0 0.0-1,800.0 pg/mL   Light Blue Top    Collection Time: 07/21/20  2:15 PM   Result Value Ref Range    Extra Tube hold for add-on    Green Top (Gel)    Collection Time: 07/21/20  2:15 PM   Result Value Ref Range    Extra Tube Hold for add-ons.    Lavender Top    Collection Time: 07/21/20  2:15 PM   Result Value Ref Range    Extra Tube hold for add-on    Gold Top - SST    Collection Time: 07/21/20  2:15 PM   Result Value  Ref Range    Extra Tube Hold for add-ons.    CBC Auto Differential    Collection Time: 07/21/20  2:15 PM   Result Value Ref Range    WBC 9.51 3.40 - 10.80 10*3/mm3    RBC 4.86 4.14 - 5.80 10*6/mm3    Hemoglobin 15.2 13.0 - 17.7 g/dL    Hematocrit 45.8 37.5 - 51.0 %    MCV 94.2 79.0 - 97.0 fL    MCH 31.3 26.6 - 33.0 pg    MCHC 33.2 31.5 - 35.7 g/dL    RDW 13.1 12.3 - 15.4 %    RDW-SD 44.8 37.0 - 54.0 fl    MPV 10.3 6.0 - 12.0 fL    Platelets 207 140 - 450 10*3/mm3    Neutrophil % 65.5 42.7 - 76.0 %    Lymphocyte % 22.4 19.6 - 45.3 %    Monocyte % 7.7 5.0 - 12.0 %    Eosinophil % 3.3 0.3 - 6.2 %    Basophil % 0.6 0.0 - 1.5 %    Immature Grans % 0.5 0.0 - 0.5 %    Neutrophils, Absolute 6.23 1.70 - 7.00 10*3/mm3    Lymphocytes, Absolute 2.13 0.70 - 3.10 10*3/mm3    Monocytes, Absolute 0.73 0.10 - 0.90 10*3/mm3    Eosinophils, Absolute 0.31 0.00 - 0.40 10*3/mm3    Basophils, Absolute 0.06 0.00 - 0.20 10*3/mm3    Immature Grans, Absolute 0.05 0.00 - 0.05 10*3/mm3    nRBC 0.0 0.0 - 0.2 /100 WBC       Ordered the above labs and independently reviewed the results.        RADIOLOGY  No Radiology Exams Resulted Within Past 24 Hours    I ordered and reviewed the above noted radiographic studies.    I viewed images of chest x-ray which showed no acute findings per my independent interpretation.  See radiologist's dictation for official interpretation.        PROCEDURES    Procedural Sedation  Date/Time: 7/21/2020 3:40 PM  Performed by: Alireza Pereyra MD  Authorized by: Alireza Pereyra MD     Consent:     Consent obtained:  Written    Consent given by:  Patient    Risks discussed:  Inadequate sedation, prolonged hypoxia resulting in organ damage and respiratory compromise necessitating ventilatory assistance and intubation  Indications:     Procedure performed:  Cardioversion    Procedure necessitating sedation performed by:  Physician performing sedation    Intended level of sedation:  Deep  Pre-sedation assessment:     ASA  classification: class 2 - patient with mild systemic disease      Pre-sedation assessments completed and reviewed: airway patency, cardiovascular function, hydration status, mental status, nausea/vomiting, respiratory function and temperature    Immediate pre-procedure details:     Reassessment: Patient reassessed immediately prior to procedure      Reviewed: vital signs and relevant labs/tests      Verified: bag valve mask available, emergency equipment available, intubation equipment available, IV patency confirmed, oxygen available and suction available    Procedure details (see MAR for exact dosages):     Sedation start time:  7/21/2020 3:32 PM    Preoxygenation:  Nonrebreather mask    Sedation:  Methohexital    Intra-procedure monitoring:  Blood pressure monitoring, cardiac monitor, continuous capnometry, continuous pulse oximetry, frequent LOC assessments and frequent vital sign checks    Intra-procedure events: none      Sedation end time:  7/21/2020 3:39 PM    Total sedation time (minutes):  7  Post-procedure details:     Attendance: Constant attendance by certified staff until patient recovered      Patient tolerance:  Tolerated well, no immediate complications  Electrical Cardioversion  Date/Time: 7/21/2020 3:42 PM  Performed by: Alireza Pereyra MD  Authorized by: Alireza Pereyra MD     Consent:     Consent obtained:  Written    Consent given by:  Patient    Risks discussed:  Induced arrhythmia    Alternatives discussed:  No treatment  Pre-procedure details:     Cardioversion basis:  Emergent    Rhythm:  Atrial fibrillation    Electrode placement:  Anterior-posterior  Attempt one:     Cardioversion mode:  Synchronous    Waveform:  Biphasic    Shock (Joules):  120    Shock outcome:  Conversion to normal sinus rhythm  Post-procedure details:     Patient status:  Awake    Patient tolerance of procedure:  Tolerated well, no immediate complications  Comments:      Coming around from sedation, still needs more  alertness before DC.          MEDICATIONS GIVEN IN ER    Medications   methohexital (BREVITAL) injection solution prefilled syringe 100 mg (100 mg Intravenous Given 7/21/20 1531)         PROGRESS, DATA ANALYSIS, CONSULTS, AND MEDICAL DECISION MAKING    All labs have been independently reviewed by me.  All radiology studies have been reviewed by me and the radiologist dictating the report.   EKG's have been independently viewed and interpreted by me.      Differential diagnoses: Atrial fibrillation.  PACs, other arrhythmias           Patient has not missed any doses of Eliquis in 2 weeks.  He has had cardioversion in the past.  Dr. Pereyra has seen the patient and is planning cardioversion here in the emergency department please see his note.    Discussed the patient with Dr. Oliveros.  He did not want to change any of his medication at this time want him follow-up in the office.    AS OF 20:51 VITALS:    BP - 111/69  HR - 69  TEMP - 97.5 °F (36.4 °C)  O2 SATS - 95%        DIAGNOSIS  Final diagnoses:   Atrial fibrillation, unspecified type (CMS/HCC)   Anticoagulated         DISPOSITION  DISCHARGE    Patient discharged in stable condition.    Reviewed implications of results, diagnosis, meds, responsibility to follow up, warning signs and symptoms of possible worsening, potential complications and reasons to return to ER.    Patient/Family voiced understanding of above instructions.    Discussed plan for discharge, as there is no emergent indication for admission.  Pt/family is agreeable and understands need for follow up and possible repeat testing.  Pt/family is aware that discharge does not mean that nothing is wrong but that it indicates no emergency is currently present that requires admission and they must continue care with follow-up as given below or with a physician of their choice.     FOLLOW-UP  Titus Oliveros IV, MD  [address] [facility]  [address]  [facility] [address] [phone]      Call  for appointment         Medication List      No changes were made to your prescriptions during this visit.                  Anthony Spicer PA  07/21/20 2059

## 2020-07-23 ENCOUNTER — EPISODE CHANGES (OUTPATIENT)
Dept: CASE MANAGEMENT | Facility: OTHER | Age: 85
End: 2020-07-23

## 2020-07-23 NOTE — PROGRESS NOTES
[facility] at [facility]  Office Visit Note    DATE: 07/24/2020    IDENTIFICATION: Darien Camarena is a 84 y.o. male who resides in [place], [place]    REASON FOR VISIT:  • Atrial fibrillation  • Hypertension            Darien Camarena presents today for follow-up sooner than expected after he presented to [facility] emergency room last weekend after he went into atrial fibrillation.  A synchronized cardioversion was performed which was successful in restoring normal sinus rhythm.  He has been maintained on sotalol therapy and anticoagulated with Eliquis.  He is in normal sinus rhythm today.  He denies signs or symptoms of TIA or CVA.  He says he just will periodically go out of rhythm.  He denies any chest pain/pressure or tightness, dyspnea, orthopnea, presyncope or syncope.  He is on statin therapy and tolerating without myalgias.  Last LDL was at goal.  He reports his blood pressures have stayed well controlled.  He does have obstructive sleep apnea but reports compliance with his CPAP.  He still works full-time as a  for [employer] systems.    Review of Systems   Constitution: Negative for malaise/fatigue.   Eyes: Negative for vision loss in left eye and vision loss in right eye.   Cardiovascular: Negative for chest pain, dyspnea on exertion, near-syncope, orthopnea, palpitations, paroxysmal nocturnal dyspnea and syncope.   Musculoskeletal: Negative for myalgias.   Neurological: Negative for brief paralysis, excessive daytime sleepiness, focal weakness, numbness, paresthesias and weakness.   All other systems reviewed and are negative.      The patient's past medical, social, family history and ROS reviewed in the patient's electronic medical record.    Allergies   Allergen Reactions   • Penicillins Hives   • Xarelto [Rivaroxaban]      GI bleed         Current Outpatient Medications:   •  allopurinol (ZYLOPRIM) 300 MG tablet, Take 1 tablet by mouth once  daily, Disp: 90 tablet, Rfl: 0  •  amLODIPine (NORVASC) 10 MG tablet, TAKE 1 TABLET BY MOUTH ONCE DAILY, Disp: 90 tablet, Rfl: 3  •  apixaban (Eliquis) 5 MG tablet tablet, Take 1 tablet by mouth Every 12 (Twelve) Hours., Disp: 180 tablet, Rfl: 1  •  Ascorbic Acid (VITAMIN C) 500 MG capsule, Take 1 tablet by mouth 4 (four) times a day., Disp: , Rfl:   •  docusate sodium (COLACE) 100 MG capsule, Take 300 mg by mouth every night., Disp: , Rfl:   •  Ferrous Gluconate (IRON) 240 (27 FE) MG tablet, Take 1 tablet by mouth daily., Disp: , Rfl:   •  finasteride (PROSCAR) 5 MG tablet, TAKE 1 TABLET BY MOUTH AT NIGHT, Disp: 90 tablet, Rfl: 0  •  hydroCHLOROthiazide (MICROZIDE) 12.5 MG capsule, TAKE 1 CAPSULE BY MOUTH ONCE DAILY IN THE MORNING, Disp: 90 capsule, Rfl: 3  •  lisinopril (PRINIVIL,ZESTRIL) 40 MG tablet, TAKE 1 TABLET BY MOUTH ONCE DAILY, Disp: 90 tablet, Rfl: 3  •  metFORMIN (GLUCOPHAGE) 1000 MG tablet, Take 1 tablet by mouth twice daily, Disp: 180 tablet, Rfl: 1  •  methenamine (HIPREX) 1 g tablet, TAKE 1/2 (ONE-HALF) TABLET BY MOUTH TWICE DAILY WITH A MEAL, Disp: 90 tablet, Rfl: 1  •  omeprazole (priLOSEC) 20 MG capsule, Take 1 capsule by mouth once daily, Disp: 90 capsule, Rfl: 0  •  pravastatin (PRAVACHOL) 40 MG tablet, TAKE 1 TABLET BY MOUTH ONCE DAILY, Disp: 90 tablet, Rfl: 3  •  Probiotic Product (PROBIOTIC ADVANCED PO), Take 1 tablet by mouth 2 (Two) Times a Day., Disp: , Rfl:   •  sotalol (BETAPACE) 120 MG tablet, Take 1 tablet by mouth 2 (Two) Times a Day., Disp: 180 tablet, Rfl: 1  •  tamsulosin (FLOMAX) 0.4 MG capsule 24 hr capsule, Take 1 capsule by mouth once daily, Disp: 90 capsule, Rfl: 0    Past Medical History:   Diagnosis Date   • Atrial fibrillation (CMS/HCC)    • Chronic kidney disease    • Diabetes mellitus (CMS/HCC)    • GERD (gastroesophageal reflux disease)    • Gout    • History of colonoscopy 09/12/2012   • Hyperlipidemia    • Hypertension    • PAF (paroxysmal atrial fibrillation)  "(CMS/[facility])    • Prostatism    • Sleep apnea     CPAP HS       Past Surgical History:   Procedure Laterality Date   • CARDIOVERSION     • CATARACT EXTRACTION Left    • KIDNEY STONE SURGERY         Family History   Problem Relation Age of Onset   • Alzheimer's disease Mother    • Cancer Father    • COPD Father        Social History     Tobacco Use   • Smoking status: Former Smoker     Last attempt to quit: 1960     Years since quittin.2   • Smokeless tobacco: Never Used   • Tobacco comment: started at 15 yo.   Substance Use Topics   • Alcohol use: No           Blood pressure 128/68, pulse 68, temperature 97.7 °F (36.5 °C), height 190.5 cm (75\"), weight 117 kg (259 lb), SpO2 99 %.  Body mass index is 32.37 kg/m².  Vitals:    20 0941   Patient Position: Sitting       Physical Exam   Constitutional: He is oriented to person, place, and time. He appears well-developed and well-nourished.   HENT:   Head: Normocephalic and atraumatic.   Eyes: Conjunctivae are normal. No scleral icterus.   Neck: Normal range of motion. No JVD present. Carotid bruit is not present. No thyromegaly present.   Cardiovascular: Normal rate and regular rhythm. Exam reveals no gallop.   No murmur heard.  Pulmonary/Chest: Effort normal and breath sounds normal.   Abdominal: Soft. He exhibits no distension and no mass. There is no hepatosplenomegaly.   Musculoskeletal: He exhibits no edema.   Neurological: He is alert and oriented to person, place, and time.   Skin: Skin is warm and dry. No rash noted.   Psychiatric: He has a normal mood and affect. His behavior is normal.       Data Review (reviewed with patient):       ECG 12 Lead  Date/Time: 2020 10:04 AM  Performed by: Blanquita Ansari APRN  Authorized by: Blanquita Ansari APRN   Comparison: compared with previous ECG from 2020  Comparison to previous ECG: Sinus bradycardia has replaced atrial fibrillation with RVR  Rhythm: sinus bradycardia  Ectopy: atrial premature " contractions  BPM: 58    Clinical impression: normal ECG  Comments: Sinus bradycardia with occasional PACs  QT/QTc 422/414 MS            Lab Results   Component Value Date    GLUCOSE 132 (H) 07/21/2020    BUN 28 (H) 07/21/2020    CREATININE 1.37 (H) 07/21/2020    EGFRIFNONA 50 (L) 07/21/2020    BCR 20.4 07/21/2020    K 4.1 07/21/2020    CO2 23.0 07/21/2020    CALCIUM 9.2 07/21/2020    ALBUMIN 3.80 07/21/2020    ALKPHOS 87 07/21/2020    AST 18 07/21/2020    ALT 21 07/21/2020      Lab Results   Component Value Date    CHOL 112 12/12/2019    TRIG 224 (H) 12/12/2019    HDL 29 (L) 12/12/2019    LDL 38 12/12/2019     Lab Results   Component Value Date    HGBA1C 6.47 (H) 12/12/2019     Lab Results   Component Value Date    WBC 9.51 07/21/2020    HGB 15.2 07/21/2020    HCT 45.8 07/21/2020    MCV 94.2 07/21/2020     07/21/2020     Lab Results   Component Value Date    TSH 1.920 07/21/2020            Problem List Items Addressed This Visit        Cardiovascular and Mediastinum    Essential hypertension    Overview     • Target blood pressure <130/80 mmHg         Current Assessment & Plan     · Hypertension is controlled  · Continue amlodipine 10 mg daily  · Continue hydrochlorothiazide 12.5 mg daily  · Continue lisinopril 40 mg daily         Hyperlipidemia LDL goal <100    Overview     · Moderate intensity statin therapy reasonable due to diabetic status         Current Assessment & Plan     · Continue Pravachol 40 mg daily  · LDL 38         Paroxysmal atrial fibrillation (CMS/HCC) - Primary    Overview     · Diagnosed August 2012.   · FARHAD-guided ECV (08/17/2012).  · CHADS-VASc 5 (age > 75, CAD, HTN, DM)  · Successful external cardioversion for asymptomatic atrial fibrillation with RVR, 10/25/18  · Successful cardioversion for atrial fibrillation RVR, 3/6/19.  Sotalol increased  · Clinic visit 4/9/2019 maintaining NSR  · Minimally symptomatic atrial fibrillation with cardioversion and the ER, 10/31/2019  · Presentation  to ED in afib RVR. ECV (7/21/2020): Successfully restored NSR         Current Assessment & Plan     · No signs or symptoms of TIA or CVA  · Maintaining normal sinus rhythm  · Continue sotalol 120 mg twice daily  · Continue Eliquis 5 mg twice daily            Respiratory    Obstructive sleep apnea syndrome    Overview     · Compliant with CPAP            Other    Long term current use of antiarrhythmic medical therapy        Patient is doing well from a cardiovascular standpoint.  EKG today shows acceptable QT interval.  He is maintaining normal sinus rhythm.  If atrial fibrillation were to recur again may need to consider stress testing as it does not appear he has had a recent one.  Will defer for now as he has no signs or symptoms of angina.  We will continue his current medications he will follow-up 6 months or sooner if needed.       · If atrial fibrillation recurs may need to consider stress testing but will defer for now as he has no signs or symptoms of angina  · Continue current medications  Return in about 6 months (around 1/24/2021), or if symptoms worsen or fail to improve, for Follow-up with Dr. Oliveros next visit.    Blanquita Ansari, APRN    7/24/2020

## 2020-07-24 ENCOUNTER — OFFICE VISIT (OUTPATIENT)
Dept: CARDIOLOGY | Facility: CLINIC | Age: 85
End: 2020-07-24

## 2020-07-24 VITALS
DIASTOLIC BLOOD PRESSURE: 68 MMHG | WEIGHT: 259 LBS | HEART RATE: 68 BPM | OXYGEN SATURATION: 99 % | BODY MASS INDEX: 32.2 KG/M2 | HEIGHT: 75 IN | TEMPERATURE: 97.7 F | SYSTOLIC BLOOD PRESSURE: 128 MMHG

## 2020-07-24 DIAGNOSIS — I10 ESSENTIAL HYPERTENSION: ICD-10-CM

## 2020-07-24 DIAGNOSIS — I48.0 PAROXYSMAL ATRIAL FIBRILLATION (HCC): Primary | ICD-10-CM

## 2020-07-24 DIAGNOSIS — G47.33 OBSTRUCTIVE SLEEP APNEA SYNDROME: ICD-10-CM

## 2020-07-24 DIAGNOSIS — Z79.899 LONG TERM CURRENT USE OF ANTIARRHYTHMIC MEDICAL THERAPY: ICD-10-CM

## 2020-07-24 DIAGNOSIS — E78.5 HYPERLIPIDEMIA LDL GOAL <100: ICD-10-CM

## 2020-07-24 PROCEDURE — 93000 ELECTROCARDIOGRAM COMPLETE: CPT | Performed by: NURSE PRACTITIONER

## 2020-07-24 PROCEDURE — 99214 OFFICE O/P EST MOD 30 MIN: CPT | Performed by: NURSE PRACTITIONER

## 2020-07-24 NOTE — ASSESSMENT & PLAN NOTE
· No signs or symptoms of TIA or CVA  · Maintaining normal sinus rhythm  · Continue sotalol 120 mg twice daily  · Continue Eliquis 5 mg twice daily

## 2020-07-24 NOTE — ASSESSMENT & PLAN NOTE
· Hypertension is controlled  · Continue amlodipine 10 mg daily  · Continue hydrochlorothiazide 12.5 mg daily  · Continue lisinopril 40 mg daily

## 2020-08-15 RX ORDER — TAMSULOSIN HYDROCHLORIDE 0.4 MG/1
CAPSULE ORAL
Qty: 90 CAPSULE | Refills: 1 | Status: SHIPPED | OUTPATIENT
Start: 2020-08-15 | End: 2021-02-15

## 2020-08-15 RX ORDER — OMEPRAZOLE 20 MG/1
CAPSULE, DELAYED RELEASE ORAL
Qty: 90 CAPSULE | Refills: 1 | Status: SHIPPED | OUTPATIENT
Start: 2020-08-15 | End: 2021-02-15

## 2020-08-19 ENCOUNTER — OFFICE VISIT (OUTPATIENT)
Dept: INTERNAL MEDICINE | Facility: CLINIC | Age: 85
End: 2020-08-19

## 2020-08-19 VITALS
SYSTOLIC BLOOD PRESSURE: 128 MMHG | TEMPERATURE: 97.9 F | HEART RATE: 64 BPM | WEIGHT: 261 LBS | DIASTOLIC BLOOD PRESSURE: 62 MMHG | BODY MASS INDEX: 32.62 KG/M2 | RESPIRATION RATE: 18 BRPM

## 2020-08-19 DIAGNOSIS — E78.5 HYPERLIPIDEMIA, UNSPECIFIED HYPERLIPIDEMIA TYPE: ICD-10-CM

## 2020-08-19 DIAGNOSIS — Z00.00 MEDICARE ANNUAL WELLNESS VISIT, INITIAL: Primary | ICD-10-CM

## 2020-08-19 DIAGNOSIS — K21.00 GASTROESOPHAGEAL REFLUX DISEASE WITH ESOPHAGITIS: ICD-10-CM

## 2020-08-19 DIAGNOSIS — E11.9 TYPE 2 DIABETES MELLITUS WITHOUT COMPLICATION, WITHOUT LONG-TERM CURRENT USE OF INSULIN (HCC): ICD-10-CM

## 2020-08-19 DIAGNOSIS — I10 ESSENTIAL HYPERTENSION: ICD-10-CM

## 2020-08-19 LAB
ALBUMIN SERPL-MCNC: 3.9 G/DL (ref 3.5–5.2)
ALBUMIN/GLOB SERPL: 1.3 G/DL
ALP SERPL-CCNC: 74 U/L (ref 39–117)
ALT SERPL W P-5'-P-CCNC: 22 U/L (ref 1–41)
ANION GAP SERPL CALCULATED.3IONS-SCNC: 9.9 MMOL/L (ref 5–15)
AST SERPL-CCNC: 18 U/L (ref 1–40)
BILIRUB SERPL-MCNC: 0.8 MG/DL (ref 0–1.2)
BUN SERPL-MCNC: 17 MG/DL (ref 8–23)
BUN/CREAT SERPL: 25.8 (ref 7–25)
CALCIUM SPEC-SCNC: 9.3 MG/DL (ref 8.6–10.5)
CHLORIDE SERPL-SCNC: 101 MMOL/L (ref 98–107)
CHOLEST SERPL-MCNC: 103 MG/DL (ref 0–200)
CO2 SERPL-SCNC: 26.1 MMOL/L (ref 22–29)
CREAT SERPL-MCNC: 0.66 MG/DL (ref 0.76–1.27)
GFR SERPL CREATININE-BSD FRML MDRD: 115 ML/MIN/1.73
GLOBULIN UR ELPH-MCNC: 3 GM/DL
GLUCOSE SERPL-MCNC: 130 MG/DL (ref 65–99)
HBA1C MFR BLD: 6.2 % (ref 4.8–5.6)
HDLC SERPL-MCNC: 27 MG/DL (ref 40–60)
LDLC SERPL CALC-MCNC: 25 MG/DL (ref 0–100)
LDLC/HDLC SERPL: 0.93 {RATIO}
POTASSIUM SERPL-SCNC: 4 MMOL/L (ref 3.5–5.2)
PROT SERPL-MCNC: 6.9 G/DL (ref 6–8.5)
SODIUM SERPL-SCNC: 137 MMOL/L (ref 136–145)
TRIGL SERPL-MCNC: 255 MG/DL (ref 0–150)
VLDLC SERPL-MCNC: 51 MG/DL (ref 5–40)

## 2020-08-19 PROCEDURE — 36415 COLL VENOUS BLD VENIPUNCTURE: CPT | Performed by: INTERNAL MEDICINE

## 2020-08-19 PROCEDURE — 80061 LIPID PANEL: CPT | Performed by: INTERNAL MEDICINE

## 2020-08-19 PROCEDURE — 83036 HEMOGLOBIN GLYCOSYLATED A1C: CPT | Performed by: INTERNAL MEDICINE

## 2020-08-19 PROCEDURE — 80053 COMPREHEN METABOLIC PANEL: CPT | Performed by: INTERNAL MEDICINE

## 2020-08-19 PROCEDURE — G0439 PPPS, SUBSEQ VISIT: HCPCS | Performed by: INTERNAL MEDICINE

## 2020-08-19 NOTE — PROGRESS NOTES
Chief Complaint   Patient presents with   • Follow-up     4 month follow up chronic medical problems   • Medicare Wellness-Initial Visit       History of Present Illness      The patient presents for a follow-up related to Type 2 Diabetes Mellitus. He does not check his blood sugars at home. He denies hypoglycemic symptoms. The patient denies polyuria, polydypsia or polyphagia. He reports no symptoms of neuropathy. The patient takes his medication as prescribed. He is not taking insulin. The patient does check his feet daily at home. He denies chest pain, shortness of breath, orthopnea, paroxysmal nocturnal dyspnea, dyspnea on exertion, edema, palpitations or syncope.    The patient presents for a follow-up related to GERD. The patient is on omeprazole for his gastroesophageal reflux. The medication is taken on a regular basis and gives complete relief of the symptoms. He reports no abdominal pain, belching, diarrhea, dysphagia, early satiety, heartburn, hoarseness, nausea, odynophagia, rectal bleeding, vomiting or weight loss. The GERD has no known aggravating factors.    The patient presents for a follow-up related to hyperlipidemia. He is following a low fat diet. He reports that he is exercising. He is taking pravastatin. The patient is taking his medication as prescribed. He reports no medication side effects, including muscle cramps, abdominal pain, headaches or weakness.    The patient presents for a follow-up related to hypertension. The patient reports that he has had no headaches or blurred vision. He states that he is taking his medication as prescribed. He is not having medication side effects.    Review of Systems    CONSTITUTIONAL- Denies Fever, Chills, Sweats, Fatigue, Weakness or Malaise.    HENT- Denies Nasal Discharge, Sore Throat, Ear Pain, Ear Drainage, Sinus Pain, Nasal Congestion, Decreased Hearing or Tinnitus.    GASTROINTESTINAL- Denies Constipation.    PULMONARY- Denies Wheezing, Sputum  Production, Cough, Hemoptysis or Pleuritic Chest Pain.    CARDIOVASCULAR- Denies Claudication or Irregular Heart Beat.    Medications      Current Outpatient Medications:   •  allopurinol (ZYLOPRIM) 300 MG tablet, Take 1 tablet by mouth once daily, Disp: 90 tablet, Rfl: 0  •  amLODIPine (NORVASC) 10 MG tablet, TAKE 1 TABLET BY MOUTH ONCE DAILY, Disp: 90 tablet, Rfl: 3  •  apixaban (Eliquis) 5 MG tablet tablet, Take 1 tablet by mouth Every 12 (Twelve) Hours., Disp: 180 tablet, Rfl: 1  •  Ascorbic Acid (VITAMIN C) 500 MG capsule, Take 1 tablet by mouth 4 (four) times a day., Disp: , Rfl:   •  docusate sodium (COLACE) 100 MG capsule, Take 300 mg by mouth every night., Disp: , Rfl:   •  Ferrous Gluconate (IRON) 240 (27 FE) MG tablet, Take 1 tablet by mouth daily., Disp: , Rfl:   •  finasteride (PROSCAR) 5 MG tablet, TAKE 1 TABLET BY MOUTH AT NIGHT, Disp: 90 tablet, Rfl: 0  •  hydroCHLOROthiazide (MICROZIDE) 12.5 MG capsule, TAKE 1 CAPSULE BY MOUTH ONCE DAILY IN THE MORNING, Disp: 90 capsule, Rfl: 3  •  lisinopril (PRINIVIL,ZESTRIL) 40 MG tablet, TAKE 1 TABLET BY MOUTH ONCE DAILY, Disp: 90 tablet, Rfl: 3  •  metFORMIN (GLUCOPHAGE) 1000 MG tablet, Take 1 tablet by mouth twice daily, Disp: 180 tablet, Rfl: 1  •  methenamine (HIPREX) 1 g tablet, TAKE 1/2 (ONE-HALF) TABLET BY MOUTH TWICE DAILY WITH A MEAL, Disp: 90 tablet, Rfl: 1  •  omeprazole (priLOSEC) 20 MG capsule, Take 1 capsule by mouth once daily, Disp: 90 capsule, Rfl: 1  •  pravastatin (PRAVACHOL) 40 MG tablet, TAKE 1 TABLET BY MOUTH ONCE DAILY, Disp: 90 tablet, Rfl: 3  •  Probiotic Product (PROBIOTIC ADVANCED PO), Take 1 tablet by mouth 2 (Two) Times a Day., Disp: , Rfl:   •  sotalol (BETAPACE) 120 MG tablet, Take 1 tablet by mouth 2 (Two) Times a Day., Disp: 180 tablet, Rfl: 1  •  tamsulosin (FLOMAX) 0.4 MG capsule 24 hr capsule, Take 1 capsule by mouth once daily, Disp: 90 capsule, Rfl: 1     Allergies    Allergies   Allergen Reactions   • Penicillins Hives   •  Xarelto [Rivaroxaban]      GI bleed       Problem List    Patient Active Problem List   Diagnosis   • Atopic rhinitis   • Benign (familial) paraproteinemia   • Type 2 diabetes mellitus, without long-term current use of insulin (CMS/HCC)   • Enlarged prostate without lower urinary tract symptoms (luts)   • Gastroesophageal reflux disease with esophagitis   • Polyp of gallbladder   • Gout   • Hematuria   • Liver cyst   • Hyperlipidemia LDL goal <100   • Essential hypertension   • Nephrolithiasis   • Obstructive sleep apnea syndrome   • Paroxysmal atrial fibrillation (CMS/HCC)   • Stage 3 chronic kidney disease (CMS/HCC)   • Long term current use of antiarrhythmic medical therapy       Medications, Allergies, Problems List and Past History were reviewed and updated.    Physical Examination    /62 (BP Location: Right arm, Patient Position: Sitting, Cuff Size: Adult)   Pulse 64   Temp 97.9 °F (36.6 °C) (Infrared)   Resp 18   Wt 118 kg (261 lb)   BMI 32.62 kg/m²     HEENT: Facies- Within normal limits. Lids- Normal bilaterally. Conjunctiva- Clear bilaterally. Sclera- Anicteric bilaterally.    Neck: Thyroid- non enlarged, symmetric and has no nodules. No bruits are detected.    Lungs: Auscultation- Clear to auscultation bilaterally. There are no retractions, clubbing or cyanosis. The Expiratory to Inspiratory ratio is equal.    Cardiovascular: There are no carotid bruits. Heart- Normal Rate with Regular rhythm and no murmurs. There are no gallops. There are no rubs. In the lower extremities there is no edema. The upper extremities do not have edema.    Abdomen: Soft, benign, non-tender with no masses, hernias, organomegaly or scars.    Impression and Assessment    Type 2 Diabetes Mellitus.    Gastroesophageal Reflux Disease.    Hyperlipidemia.    Essential Hypertension.    Plan    Gastroesophageal Reflux Disease Plan: The current plan was continued.    Hyperlipidemia Plan: The patient was instructed to exercise  daily, eat a low fat diet and continue his medications.    Essential Hypertension Plan: The current plan was continued.    Type 2 Diabetes Mellitus Plan: The current plan was continued.    Darien was seen today for follow-up and medicare wellness-initial visit.    Diagnoses and all orders for this visit:    Medicare annual wellness visit, initial    Type 2 diabetes mellitus without complication, without long-term current use of insulin (CMS/[facility])  -     Comprehensive Metabolic Panel  -     Hemoglobin A1c    Gastroesophageal reflux disease with esophagitis    Essential hypertension  -     Comprehensive Metabolic Panel    Hyperlipidemia, unspecified hyperlipidemia type  -     Comprehensive Metabolic Panel  -     Lipid Panel          Return to Office    The patient was instructed to return for follow-up in 4 months.    The patient was instructed to return sooner if the condition changes, worsens, or does not resolve.

## 2020-08-19 NOTE — PATIENT INSTRUCTIONS
Medicare Wellness  Personal Prevention Plan of Service     Date of Office Visit:  2020  Encounter Provider:  Pito Way MD  Place of Service:  [facility]  Patient Name: Darien Camarena  :  1935    As part of the Medicare Wellness portion of your visit today, we are providing you with this personalized preventive plan of services (PPPS). This plan is based upon recommendations of the [place] Preventive Services Task Force (USPSTF) and the Advisory Committee on Immunization Practices (ACIP).    This lists the preventive care services that should be considered, and provides dates of when you are due. Items listed as completed are up-to-date and do not require any further intervention.    Health Maintenance   Topic Date Due   • ZOSTER VACCINE (1 of 2) 10/12/1986   • DIABETIC EYE EXAM  2018   • MEDICARE ANNUAL WELLNESS  2019   • URINE MICROALBUMIN  2020   • HEMOGLOBIN A1C  2020   • INFLUENZA VACCINE  2020   • LIPID PANEL  2020   • TDAP/TD VACCINES (3 - Td) 2028   • Pneumococcal Vaccine Once at 65 Years Old  Completed       No orders of the defined types were placed in this encounter.      No follow-ups on file.          Advance Care Planning and Advance Directives     You make decisions on a daily basis - decisions about where you want to live, your career, your home, your life. Perhaps one of the most important decisions you face is your choice for future medical care. Take time to talk with your family and your healthcare team and start planning today.  Advance Care Planning is a process that can help you:  · Understand possible future healthcare decisions in light of your own experiences  · Reflect on those decision in light of your goals and values  · Discuss your decisions with those closest to you and the healthcare professionals that care for you  · Make a plan by creating a document  that reflects your wishes    Surrogate Decision Maker  In the event of a medical emergency, which has left you unable to communicate or to make your own decisions, you would need someone to make decisions for you.  It is important to discuss your preferences for medical treatment with this person while you are in good health.     Qualities of a surrogate decision maker:  • Willing to take on this role and responsibility  • Knows what you want for future medical care  • Willing to follow your wishes even if they don't agree with them  • Able to make difficult medical decisions under stressful circumstances    Advance Directives  These are legal documents you can create that will guide your healthcare team and decision maker(s) when needed. These documents can be stored in the electronic medical record.    · Living Will - a legal document to guide your care if you have a terminal condition or a serious illness and are unable to communicate. States vary by statute in document names/types, but most forms may include one or more of the following:        -  Directions regarding life-prolonging treatments        -  Directions regarding artificially provided nutrition/hydration        -  Choosing a healthcare decision maker        -  Direction regarding organ/tissue donation    · Durable Power of  for Healthcare - this document names an -in-fact to make medical decisions for you, but it may also allow this person to make personal and financial decisions for you. Please seek the advice of an  if you need this type of document.    **Advance Directives are not required and no one may discriminate against you if you do not sign one.    Medical Orders  Many states allow specific forms/orders signed by your physician to record your wishes for medical treatment in your current state of health. This form, signed in personal communication with your physician, addresses resuscitation and other medical  interventions that you may or may not want.      For more information or to schedule a time with a [facility] Advance Care Planning Facilitator contact: [facility]/ACP or call [phone] and someone will contact you directly.    Fall Prevention in the Home, Adult  Falls can cause injuries. They can happen to people of all ages. There are many things you can do to make your home safe and to help prevent falls. Ask for help when making these changes, if needed.  What actions can I take to prevent falls?  General Instructions  · Use good lighting in all rooms. Replace any light bulbs that burn out.  · Turn on the lights when you go into a dark area. Use night-lights.  · Keep items that you use often in easy-to-reach places. Lower the shelves around your home if necessary.  · Set up your furniture so you have a clear path. Avoid moving your furniture around.  · Do not have throw rugs and other things on the floor that can make you trip.  · Avoid walking on wet floors.  · If any of your floors are uneven, fix them.  · Add color or contrast paint or tape to clearly kristal and help you see:  ? Any grab bars or handrails.  ? First and last steps of stairways.  ? Where the edge of each step is.  · If you use a stepladder:  ? Make sure that it is fully opened. Do not climb a closed stepladder.  ? Make sure that both sides of the stepladder are locked into place.  ? Ask someone to hold the stepladder for you while you use it.  · If there are any pets around you, be aware of where they are.  What can I do in the bathroom?         · Keep the floor dry. Clean up any water that spills onto the floor as soon as it happens.  · Remove soap buildup in the tub or shower regularly.  · Use non-skid mats or decals on the floor of the tub or shower.  · Attach bath mats securely with double-sided, non-slip rug tape.  · If you need to sit down in the shower, use a plastic, non-slip stool.  · Install grab bars by the toilet and in  the tub and shower. Do not use towel bars as grab bars.  What can I do in the bedroom?  · Make sure that you have a light by your bed that is easy to reach.  · Do not use any sheets or blankets that are too big for your bed. They should not hang down onto the floor.  · Have a firm chair that has side arms. You can use this for support while you get dressed.  What can I do in the kitchen?  · Clean up any spills right away.  · If you need to reach something above you, use a strong step stool that has a grab bar.  · Keep electrical cords out of the way.  · Do not use floor polish or wax that makes floors slippery. If you must use wax, use non-skid floor wax.  What can I do with my stairs?  · Do not leave any items on the stairs.  · Make sure that you have a light switch at the top of the stairs and the bottom of the stairs. If you do not have them, ask someone to add them for you.  · Make sure that there are handrails on both sides of the stairs, and use them. Fix handrails that are broken or loose. Make sure that handrails are as long as the stairways.  · Install non-slip stair treads on all stairs in your home.  · Avoid having throw rugs at the top or bottom of the stairs. If you do have throw rugs, attach them to the floor with carpet tape.  · Choose a carpet that does not hide the edge of the steps on the stairway.  · Check any carpeting to make sure that it is firmly attached to the stairs. Fix any carpet that is loose or worn.  What can I do on the outside of my home?  · Use bright outdoor lighting.  · Regularly fix the edges of walkways and driveways and fix any cracks.  · Remove anything that might make you trip as you walk through a door, such as a raised step or threshold.  · Trim any bushes or trees on the path to your home.  · Regularly check to see if handrails are loose or broken. Make sure that both sides of any steps have handrails.  · Install guardrails along the edges of any raised decks and  porches.  · Clear walking paths of anything that might make someone trip, such as tools or rocks.  · Have any leaves, snow, or ice cleared regularly.  · Use sand or salt on walking paths during winter.  · Clean up any spills in your garage right away. This includes grease or oil spills.  What other actions can I take?  · Wear shoes that:  ? Have a low heel. Do not wear high heels.  ? Have rubber bottoms.  ? Are comfortable and fit you well.  ? Are closed at the toe. Do not wear open-toe sandals.  · Use tools that help you move around (mobility aids) if they are needed. These include:  ? Canes.  ? Walkers.  ? Scooters.  ? Crutches.  · Review your medicines with your doctor. Some medicines can make you feel dizzy. This can increase your chance of falling.  Ask your doctor what other things you can do to help prevent falls.  Where to find more information  · Centers for Disease Control and Prevention, STEAD[employer]: https://cdc.gov  · National [place] on Aging: https://iq1ybry.roman.nih.gov  Contact a doctor if:  · You are afraid of falling at home.  · You feel weak, drowsy, or dizzy at home.  · You fall at home.  Summary  · There are many simple things that you can do to make your home safe and to help prevent falls.  · Ways to make your home safe include removing tripping hazards and installing grab bars in the bathroom.  · Ask for help when making these changes in your home.  This information is not intended to replace advice given to you by your health care provider. Make sure you discuss any questions you have with your health care provider.  Document Released: 10/14/2010 Document Revised: 04/09/2020 Document Reviewed: 08/02/2018  Elsevier Patient Education © 2020 [employer]

## 2020-08-19 NOTE — PROGRESS NOTES
The ABCs of the Annual Wellness Visit  Initial Medicare Wellness Visit    Chief Complaint   Patient presents with   • Follow-up     4 month follow up chronic medical problems   • Medicare Wellness-Initial Visit       Subjective   History of Present Illness:  Darien Camarena is a 84 y.o. male who presents for an Initial Medicare Wellness Visit.    HEALTH RISK ASSESSMENT    Recent Hospitalizations:  Recently treated at the following:  [facility]    Current Medical Providers:  Patient Care Team:  Pito Way MD as PCP - General  Pito Way MD as PCP - Family Medicine  Debacher, Shiela Saleem PA-C as PCP - Claims Attributed  Titus Oliveros IV, MD as Consulting Physician (Cardiology)  Tyra Mercer RN as Ambulatory  (Population Health)    Smoking Status:  Social History     Tobacco Use   Smoking Status Former Smoker   • Last attempt to quit: 1960   • Years since quittin.3   Smokeless Tobacco Never Used   Tobacco Comment    started at 15 yo.       Alcohol Consumption:  Social History     Substance and Sexual Activity   Alcohol Use No       Depression Screen:   PHQ-2/PHQ-9 Depression Screening 2020   Little interest or pleasure in doing things 0   Feeling down, depressed, or hopeless 0   Total Score 0       Fall Risk Screen:  MYRONADI Fall Risk Assessment was completed, and patient is at LOW risk for falls.Assessment completed on:2020    Health Habits and Functional and Cognitive Screening:  Functional & Cognitive Status 2020   Do you have difficulty preparing food and eating? No   Do you have difficulty bathing yourself, getting dressed or grooming yourself? No   Do you have difficulty using the toilet? No   Do you have difficulty moving around from place to place? No   Do you have trouble with steps or getting out of a bed or a chair? No   Current Diet Well Balanced Diet   Dental Exam Not up to date   Eye Exam Up to date   Exercise  (times per week) 0 times per week   Current Exercise Activities Include None   Do you need help using the phone?  No   Are you deaf or do you have serious difficulty hearing?  No   Do you need help with transportation? No   Do you need help shopping? No   Do you need help preparing meals?  No   Do you need help with housework?  No   Do you need help with laundry? No   Do you need help taking your medications? No   Do you need help managing money? No   Do you ever drive or ride in a car without wearing a seat belt? No   Have you felt unusual stress, anger or loneliness in the last month? No   Who do you live with? Spouse   If you need help, do you have trouble finding someone available to you? No   Have you been bothered in the last four weeks by sexual problems? No   Do you have difficulty concentrating, remembering or making decisions? No         Does the patient have evidence of cognitive impairment? No    Asprin use counseling:Does not need ASA (and currently is not on it)    Age-appropriate Screening Schedule:  Refer to the list below for future screening recommendations based on patient's age, sex and/or medical conditions. Orders for these recommended tests are listed in the plan section. The patient has been provided with a written plan.    Health Maintenance   Topic Date Due   • ZOSTER VACCINE (1 of 2) 10/12/1986   • DIABETIC EYE EXAM  05/01/2018   • URINE MICROALBUMIN  04/17/2020   • HEMOGLOBIN A1C  06/12/2020   • INFLUENZA VACCINE  08/01/2020   • LIPID PANEL  12/12/2020   • TDAP/TD VACCINES (3 - Td) 06/14/2028          The following portions of the patient's history were reviewed and updated as appropriate: allergies, current medications, past family history, past medical history, past social history, past surgical history and problem list.    Outpatient Medications Prior to Visit   Medication Sig Dispense Refill   • allopurinol (ZYLOPRIM) 300 MG tablet Take 1 tablet by mouth once daily 90 tablet 0   •  amLODIPine (NORVASC) 10 MG tablet TAKE 1 TABLET BY MOUTH ONCE DAILY 90 tablet 3   • apixaban (Eliquis) 5 MG tablet tablet Take 1 tablet by mouth Every 12 (Twelve) Hours. 180 tablet 1   • Ascorbic Acid (VITAMIN C) 500 MG capsule Take 1 tablet by mouth 4 (four) times a day.     • docusate sodium (COLACE) 100 MG capsule Take 300 mg by mouth every night.     • Ferrous Gluconate (IRON) 240 (27 FE) MG tablet Take 1 tablet by mouth daily.     • finasteride (PROSCAR) 5 MG tablet TAKE 1 TABLET BY MOUTH AT NIGHT 90 tablet 0   • hydroCHLOROthiazide (MICROZIDE) 12.5 MG capsule TAKE 1 CAPSULE BY MOUTH ONCE DAILY IN THE MORNING 90 capsule 3   • lisinopril (PRINIVIL,ZESTRIL) 40 MG tablet TAKE 1 TABLET BY MOUTH ONCE DAILY 90 tablet 3   • metFORMIN (GLUCOPHAGE) 1000 MG tablet Take 1 tablet by mouth twice daily 180 tablet 1   • methenamine (HIPREX) 1 g tablet TAKE 1/2 (ONE-HALF) TABLET BY MOUTH TWICE DAILY WITH A MEAL 90 tablet 1   • omeprazole (priLOSEC) 20 MG capsule Take 1 capsule by mouth once daily 90 capsule 1   • pravastatin (PRAVACHOL) 40 MG tablet TAKE 1 TABLET BY MOUTH ONCE DAILY 90 tablet 3   • Probiotic Product (PROBIOTIC ADVANCED PO) Take 1 tablet by mouth 2 (Two) Times a Day.     • sotalol (BETAPACE) 120 MG tablet Take 1 tablet by mouth 2 (Two) Times a Day. 180 tablet 1   • tamsulosin (FLOMAX) 0.4 MG capsule 24 hr capsule Take 1 capsule by mouth once daily 90 capsule 1     No facility-administered medications prior to visit.        Patient Active Problem List   Diagnosis   • Atopic rhinitis   • Benign (familial) paraproteinemia   • Type 2 diabetes mellitus, without long-term current use of insulin (CMS/HCC)   • Enlarged prostate without lower urinary tract symptoms (luts)   • Gastroesophageal reflux disease with esophagitis   • Polyp of gallbladder   • Gout   • Hematuria   • Liver cyst   • Hyperlipidemia LDL goal <100   • Essential hypertension   • Nephrolithiasis   • Obstructive sleep apnea syndrome   • Paroxysmal  atrial fibrillation (CMS/[facility])   • Stage 3 chronic kidney disease (CMS/[facility])   • Long term current use of antiarrhythmic medical therapy       Advanced Care Planning:  ACP discussion was held with the patient during this visit. Patient has an advance directive (not in EMR), copy requested.    Review of Systems    Compared to one year ago, the patient feels his physical health is the same.  Compared to one year ago, the patient feels his mental health is the same.    Reviewed chart for potential of high risk medication in the elderly: yes  Reviewed chart for potential of harmful drug interactions in the elderly:yes    Objective         Vitals:    08/19/20 1355   BP: 128/62   BP Location: Right arm   Patient Position: Sitting   Cuff Size: Adult   Pulse: 64   Resp: 18   Temp: 97.9 °F (36.6 °C)   TempSrc: Infrared   Weight: 118 kg (261 lb)   PainSc: 0-No pain       Body mass index is 32.62 kg/m².  Discussed the patient's BMI with him. The BMI is above average; BMI management plan is completed.    Physical Exam  Finger Rub Hearing{Test (right ear):passed  Finger Rub Hearing{Test (left ear):passed            Assessment/Plan   Medicare Risks and Personalized Health Plan  CMS Preventative Services Quick Reference  Advance Directive Discussion  Fall Risk  Obesity/Overweight     The above risks/problems have been discussed with the patient.  Pertinent information has been shared with the patient in the After Visit Summary.  Follow up plans and orders are seen below in the Assessment/Plan Section.        Darien was seen today for follow-up and medicare wellness-initial visit.    Diagnoses and all orders for this visit:    Medicare annual wellness visit, initial          An After Visit Summary and PPPS were given to the patient.

## 2020-08-29 DIAGNOSIS — I10 ESSENTIAL HYPERTENSION: Chronic | ICD-10-CM

## 2020-08-31 RX ORDER — LISINOPRIL 40 MG/1
TABLET ORAL
Qty: 90 TABLET | Refills: 2 | Status: SHIPPED | OUTPATIENT
Start: 2020-08-31 | End: 2021-06-07

## 2020-09-26 RX ORDER — FINASTERIDE 5 MG/1
TABLET, FILM COATED ORAL
Qty: 90 TABLET | Refills: 1 | Status: SHIPPED | OUTPATIENT
Start: 2020-09-26 | End: 2021-03-16

## 2020-10-12 RX ORDER — METHENAMINE HIPPURATE 1000 MG/1
TABLET ORAL
Qty: 90 TABLET | Refills: 1 | Status: SHIPPED | OUTPATIENT
Start: 2020-10-12 | End: 2021-04-15

## 2020-10-12 RX ORDER — ALLOPURINOL 300 MG/1
TABLET ORAL
Qty: 90 TABLET | Refills: 1 | Status: SHIPPED | OUTPATIENT
Start: 2020-10-12 | End: 2021-03-16

## 2020-11-07 DIAGNOSIS — E78.5 HYPERLIPIDEMIA LDL GOAL <100: ICD-10-CM

## 2020-11-09 RX ORDER — PRAVASTATIN SODIUM 40 MG
TABLET ORAL
Qty: 90 TABLET | Refills: 3 | Status: SHIPPED | OUTPATIENT
Start: 2020-11-09 | End: 2021-11-08

## 2020-11-17 DIAGNOSIS — I48.0 PAROXYSMAL ATRIAL FIBRILLATION (HCC): ICD-10-CM

## 2020-11-17 NOTE — TELEPHONE ENCOUNTER
Patient called regarding Eliquis patient assistance for next year. Told him to hold off until next year when he has met the OOP requirements. He also wanted a script sent to [facility] so it is on file for next year. He verbalized understanding.

## 2020-12-15 ENCOUNTER — OFFICE VISIT (OUTPATIENT)
Dept: INTERNAL MEDICINE | Facility: CLINIC | Age: 85
End: 2020-12-15

## 2020-12-15 VITALS
RESPIRATION RATE: 20 BRPM | SYSTOLIC BLOOD PRESSURE: 142 MMHG | WEIGHT: 270 LBS | BODY MASS INDEX: 33.75 KG/M2 | TEMPERATURE: 97.2 F | DIASTOLIC BLOOD PRESSURE: 78 MMHG | HEART RATE: 78 BPM

## 2020-12-15 DIAGNOSIS — E78.5 HYPERLIPIDEMIA, UNSPECIFIED HYPERLIPIDEMIA TYPE: ICD-10-CM

## 2020-12-15 DIAGNOSIS — E11.9 TYPE 2 DIABETES MELLITUS WITHOUT COMPLICATION, WITHOUT LONG-TERM CURRENT USE OF INSULIN (HCC): Primary | ICD-10-CM

## 2020-12-15 DIAGNOSIS — I10 ESSENTIAL HYPERTENSION: ICD-10-CM

## 2020-12-15 DIAGNOSIS — K21.00 GASTROESOPHAGEAL REFLUX DISEASE WITH ESOPHAGITIS WITHOUT HEMORRHAGE: ICD-10-CM

## 2020-12-15 PROCEDURE — 83036 HEMOGLOBIN GLYCOSYLATED A1C: CPT | Performed by: INTERNAL MEDICINE

## 2020-12-15 PROCEDURE — 80061 LIPID PANEL: CPT | Performed by: INTERNAL MEDICINE

## 2020-12-15 PROCEDURE — 80053 COMPREHEN METABOLIC PANEL: CPT | Performed by: INTERNAL MEDICINE

## 2020-12-15 PROCEDURE — 99214 OFFICE O/P EST MOD 30 MIN: CPT | Performed by: INTERNAL MEDICINE

## 2020-12-15 PROCEDURE — 36415 COLL VENOUS BLD VENIPUNCTURE: CPT | Performed by: INTERNAL MEDICINE

## 2020-12-15 NOTE — PROGRESS NOTES
Chief Complaint   Patient presents with   • Follow-up     4 MONTH FOLLOW UP CHRONIC MEDICAL PROBLEMS       History of Present Illness    The patient presents for a follow-up related to Type 2 Diabetes Mellitus. He does not check his blood sugars at home. He denies hypoglycemic symptoms. The patient denies polyuria, polydypsia or polyphagia. He reports no symptoms of neuropathy. The patient takes his medication as prescribed. He is not taking insulin. The patient does check his feet daily at home. He denies chest pain, shortness of breath, orthopnea, paroxysmal nocturnal dyspnea, dyspnea on exertion, edema, palpitations or syncope.    The patient presents for a follow-up related to GERD. The patient is on omeprazole for his gastroesophageal reflux. The medication is taken on a regular basis and gives complete relief of the symptoms. He reports no abdominal pain, belching, diarrhea, dysphagia, early satiety, heartburn, hoarseness, nausea, odynophagia, rectal bleeding, vomiting or weight loss. The GERD has no known aggravating factors.    The patient presents for a follow-up related to hyperlipidemia. He is following a low fat diet. He reports that he is exercising. He is taking pravastatin. The patient is taking his medication as prescribed. He reports no medication side effects, including muscle cramps, abdominal pain, headaches or weakness.    The patient presents for a follow-up related to hypertension. The patient reports that he has had no headaches or blurred vision. He states that he is taking his medication as prescribed. He is not having medication side effects.    Review of Systems    CONSTITUTIONAL- Denies Fever, Chills, Sweats, Fatigue, Weakness or Malaise.    HENT- Denies Nasal Discharge, Sore Throat, Ear Pain, Ear Drainage, Sinus Pain, Nasal Congestion, Decreased Hearing or Tinnitus.    GASTROINTESTINAL- Denies Constipation.    PULMONARY- Denies Wheezing, Sputum Production, Cough, Hemoptysis or Pleuritic  Chest Pain.    CARDIOVASCULAR- Denies Claudication or Irregular Heart Beat.    Medications      Current Outpatient Medications:   •  allopurinol (ZYLOPRIM) 300 MG tablet, Take 1 tablet by mouth once daily, Disp: 90 tablet, Rfl: 1  •  amLODIPine (NORVASC) 10 MG tablet, TAKE 1 TABLET BY MOUTH ONCE DAILY, Disp: 90 tablet, Rfl: 3  •  apixaban (Eliquis) 5 MG tablet tablet, Take 1 tablet by mouth Every 12 (Twelve) Hours., Disp: 180 tablet, Rfl: 1  •  Ascorbic Acid (VITAMIN C) 500 MG capsule, Take 1 tablet by mouth 4 (four) times a day., Disp: , Rfl:   •  docusate sodium (COLACE) 100 MG capsule, Take 300 mg by mouth every night., Disp: , Rfl:   •  Ferrous Gluconate (IRON) 240 (27 FE) MG tablet, Take 1 tablet by mouth daily., Disp: , Rfl:   •  finasteride (PROSCAR) 5 MG tablet, TAKE 1 TABLET BY MOUTH AT NIGHT, Disp: 90 tablet, Rfl: 1  •  hydroCHLOROthiazide (MICROZIDE) 12.5 MG capsule, TAKE 1 CAPSULE BY MOUTH ONCE DAILY IN THE MORNING, Disp: 90 capsule, Rfl: 3  •  lisinopril (PRINIVIL,ZESTRIL) 40 MG tablet, Take 1 tablet by mouth once daily, Disp: 90 tablet, Rfl: 2  •  metFORMIN (GLUCOPHAGE) 1000 MG tablet, Take 1 tablet by mouth twice daily, Disp: 180 tablet, Rfl: 1  •  methenamine (HIPREX) 1 g tablet, TAKE ONE-HALF TABLET BY MOUTH TWICE DAILY WITH A MEAL, Disp: 90 tablet, Rfl: 1  •  omeprazole (priLOSEC) 20 MG capsule, Take 1 capsule by mouth once daily, Disp: 90 capsule, Rfl: 1  •  pravastatin (PRAVACHOL) 40 MG tablet, Take 1 tablet by mouth once daily, Disp: 90 tablet, Rfl: 3  •  Probiotic Product (PROBIOTIC ADVANCED PO), Take 1 tablet by mouth 2 (Two) Times a Day., Disp: , Rfl:   •  sotalol (BETAPACE) 120 MG tablet, Take 1 tablet by mouth 2 (Two) Times a Day., Disp: 180 tablet, Rfl: 1  •  tamsulosin (FLOMAX) 0.4 MG capsule 24 hr capsule, Take 1 capsule by mouth once daily, Disp: 90 capsule, Rfl: 1     Allergies    Allergies   Allergen Reactions   • Penicillins Hives   • Xarelto [Rivaroxaban]      GI bleed       Problem  List    Patient Active Problem List   Diagnosis   • Atopic rhinitis   • Benign (familial) paraproteinemia   • Type 2 diabetes mellitus, without long-term current use of insulin (CMS/HCC)   • Enlarged prostate without lower urinary tract symptoms (luts)   • Gastroesophageal reflux disease with esophagitis   • Polyp of gallbladder   • Gout   • Hematuria   • Liver cyst   • Hyperlipidemia LDL goal <100   • Essential hypertension   • Nephrolithiasis   • Obstructive sleep apnea syndrome   • Paroxysmal atrial fibrillation (CMS/HCC)   • Stage 3 chronic kidney disease   • Long term current use of antiarrhythmic medical therapy       Medications, Allergies, Problems List and Past History were reviewed and updated.    Physical Examination    /78 (BP Location: Left arm, Patient Position: Sitting, Cuff Size: Adult)   Pulse 78   Temp 97.2 °F (36.2 °C) (Infrared)   Resp 20   Wt 122 kg (270 lb)   BMI 33.75 kg/m²     HEENT: Head- Normocephalic Atraumatic. Facies- Within normal limits. Pinnas- Normal texture and shape bilaterally. Canals- Normal bilaterally. TMs- Normal bilaterally. Nares- Patent bilaterally. Nasal Septum- is normal. There is no tenderness to palpation over the frontal or maxillary sinuses. Lids- Normal bilaterally. Conjunctiva- Clear bilaterally. Sclera- Anicteric bilaterally. Oropharynx- Moist with no lesions. Tonsils- No enlargement, erythema or exudate.    Neck: Thyroid- non enlarged, symmetric and has no nodules. No bruits are detected.    Lungs: Auscultation- Clear to auscultation bilaterally. There are no retractions, clubbing or cyanosis. The Expiratory to Inspiratory ratio is equal.    Cardiovascular: There are no carotid bruits. Heart- Normal Rate with Regular rhythm and no murmurs. There are no gallops. There are no rubs. In the lower extremities there is no edema. The upper extremities do not have edema.    Abdomen: Soft, benign, non-tender with no masses, hernias, organomegaly or  scars.    Impression and Assessment    Type 2 Diabetes Mellitus without complication without insulin usage.    Gastroesophageal Reflux Disease.    Hyperlipidemia.    Essential Hypertension.    Plan    Gastroesophageal Reflux Disease Plan: The current plan was continued.    Hyperlipidemia Plan: The patient was instructed to exercise daily, eat a low fat diet and continue his medications.    Essential Hypertension Plan: The current plan was continued.    Type 2 Diabetes Mellitus without complication without insulin usage Plan: The current plan was continued.    Diagnoses and all orders for this visit:    1. Type 2 diabetes mellitus without complication, without long-term current use of insulin (CMS/[facility]) (Primary)  -     Comprehensive Metabolic Panel  -     Hemoglobin A1c    2. Gastroesophageal reflux disease with esophagitis without hemorrhage    3. Essential hypertension  -     Comprehensive Metabolic Panel    4. Hyperlipidemia, unspecified hyperlipidemia type  -     Comprehensive Metabolic Panel  -     Lipid Panel          Return to Office    The patient was instructed to return for follow-up in 6 months. The patient was instructed to return sooner if the condition changes, worsens, or does not resolve.

## 2020-12-16 LAB
ALBUMIN SERPL-MCNC: 3.9 G/DL (ref 3.5–5.2)
ALBUMIN/GLOB SERPL: 1.2 G/DL
ALP SERPL-CCNC: 85 U/L (ref 39–117)
ALT SERPL W P-5'-P-CCNC: 25 U/L (ref 1–41)
ANION GAP SERPL CALCULATED.3IONS-SCNC: 9 MMOL/L (ref 5–15)
AST SERPL-CCNC: 19 U/L (ref 1–40)
BILIRUB SERPL-MCNC: 0.8 MG/DL (ref 0–1.2)
BUN SERPL-MCNC: 20 MG/DL (ref 8–23)
BUN/CREAT SERPL: 26 (ref 7–25)
CALCIUM SPEC-SCNC: 9.2 MG/DL (ref 8.6–10.5)
CHLORIDE SERPL-SCNC: 102 MMOL/L (ref 98–107)
CHOLEST SERPL-MCNC: 114 MG/DL (ref 0–200)
CO2 SERPL-SCNC: 30 MMOL/L (ref 22–29)
CREAT SERPL-MCNC: 0.77 MG/DL (ref 0.76–1.27)
GFR SERPL CREATININE-BSD FRML MDRD: 96 ML/MIN/1.73
GLOBULIN UR ELPH-MCNC: 3.3 GM/DL
GLUCOSE SERPL-MCNC: 154 MG/DL (ref 65–99)
HBA1C MFR BLD: 6.95 % (ref 4.8–5.6)
HDLC SERPL-MCNC: 31 MG/DL (ref 40–60)
LDLC SERPL CALC-MCNC: 44 MG/DL (ref 0–100)
LDLC/HDLC SERPL: 1.08 {RATIO}
POTASSIUM SERPL-SCNC: 4 MMOL/L (ref 3.5–5.2)
PROT SERPL-MCNC: 7.2 G/DL (ref 6–8.5)
SODIUM SERPL-SCNC: 141 MMOL/L (ref 136–145)
TRIGL SERPL-MCNC: 248 MG/DL (ref 0–150)
VLDLC SERPL-MCNC: 39 MG/DL (ref 5–40)

## 2020-12-19 DIAGNOSIS — I10 ESSENTIAL HYPERTENSION: Chronic | ICD-10-CM

## 2020-12-21 RX ORDER — AMLODIPINE BESYLATE 10 MG/1
TABLET ORAL
Qty: 90 TABLET | Refills: 0 | Status: SHIPPED | OUTPATIENT
Start: 2020-12-21 | End: 2021-03-16

## 2020-12-21 RX ORDER — HYDROCHLOROTHIAZIDE 12.5 MG/1
CAPSULE, GELATIN COATED ORAL
Qty: 90 CAPSULE | Refills: 0 | Status: SHIPPED | OUTPATIENT
Start: 2020-12-21 | End: 2021-03-16

## 2020-12-21 NOTE — TELEPHONE ENCOUNTER
Medication Refill Request    Medication:  - amLODIPine (NORVASC) 10 MG tablet daily  - hydroCHLOROthiazide (MICROZIDE) 12.5 MG capsule daily    Pertinent Labs:  Lab Results   Component Value Date    GLUCOSE 154 (H) 12/15/2020    BUN 20 12/15/2020    CREATININE 0.77 12/15/2020    EGFRIFNONA 96 12/15/2020    BCR 26.0 (H) 12/15/2020    K 4.0 12/15/2020    CO2 30.0 (H) 12/15/2020    CALCIUM 9.2 12/15/2020    ALBUMIN 3.90 12/15/2020    ALKPHOS 85 12/15/2020    AST 19 12/15/2020    ALT 25 12/15/2020      Lab Results   Component Value Date    CHOL 114 12/15/2020    TRIG 248 (H) 12/15/2020    HDL 31 (L) 12/15/2020    LDL 44 12/15/2020     Lab Results   Component Value Date    HGBA1C 6.95 (H) 12/15/2020     Lab Results   Component Value Date    WBC 9.51 07/21/2020    HGB 15.2 07/21/2020    HCT 45.8 07/21/2020    MCV 94.2 07/21/2020     07/21/2020     Lab Results   Component Value Date    TSH 1.920 07/21/2020

## 2020-12-22 ENCOUNTER — TELEPHONE (OUTPATIENT)
Dept: INTERNAL MEDICINE | Facility: CLINIC | Age: 85
End: 2020-12-22

## 2020-12-22 ENCOUNTER — HOSPITAL ENCOUNTER (EMERGENCY)
Facility: HOSPITAL | Age: 85
Discharge: HOME | End: 2020-12-22
Attending: EMERGENCY MEDICINE | Admitting: EMERGENCY MEDICINE

## 2020-12-22 ENCOUNTER — APPOINTMENT (OUTPATIENT)
Dept: CT IMAGING | Facility: HOSPITAL | Age: 85
End: 2020-12-22

## 2020-12-22 VITALS
RESPIRATION RATE: 16 BRPM | WEIGHT: 270 LBS | DIASTOLIC BLOOD PRESSURE: 94 MMHG | SYSTOLIC BLOOD PRESSURE: 152 MMHG | BODY MASS INDEX: 33.57 KG/M2 | OXYGEN SATURATION: 94 % | HEART RATE: 75 BPM | HEIGHT: 75 IN | TEMPERATURE: 96 F

## 2020-12-22 DIAGNOSIS — N39.0 URINARY TRACT INFECTION WITHOUT HEMATURIA, SITE UNSPECIFIED: Primary | ICD-10-CM

## 2020-12-22 LAB
ALBUMIN SERPL-MCNC: 4.1 G/DL (ref 3.5–5.2)
ALBUMIN/GLOB SERPL: 1.2 G/DL
ALP SERPL-CCNC: 92 U/L (ref 39–117)
ALT SERPL W P-5'-P-CCNC: 21 U/L (ref 1–41)
ANION GAP SERPL CALCULATED.3IONS-SCNC: 10 MMOL/L (ref 5–15)
AST SERPL-CCNC: 23 U/L (ref 1–40)
BACTERIA UR QL AUTO: ABNORMAL /HPF
BASOPHILS # BLD AUTO: 0.07 10*3/MM3 (ref 0–0.2)
BASOPHILS NFR BLD AUTO: 0.9 % (ref 0–1.5)
BILIRUB SERPL-MCNC: 1 MG/DL (ref 0–1.2)
BILIRUB UR QL STRIP: NEGATIVE
BUN SERPL-MCNC: 20 MG/DL (ref 8–23)
BUN/CREAT SERPL: 33.9 (ref 7–25)
CALCIUM SPEC-SCNC: 9.4 MG/DL (ref 8.6–10.5)
CHLORIDE SERPL-SCNC: 104 MMOL/L (ref 98–107)
CLARITY UR: ABNORMAL
CO2 SERPL-SCNC: 26 MMOL/L (ref 22–29)
COLOR UR: ABNORMAL
CREAT SERPL-MCNC: 0.59 MG/DL (ref 0.76–1.27)
D-LACTATE SERPL-SCNC: 1.8 MMOL/L (ref 0.5–2)
DEPRECATED RDW RBC AUTO: 47.3 FL (ref 37–54)
EOSINOPHIL # BLD AUTO: 0.43 10*3/MM3 (ref 0–0.4)
EOSINOPHIL NFR BLD AUTO: 5.6 % (ref 0.3–6.2)
ERYTHROCYTE [DISTWIDTH] IN BLOOD BY AUTOMATED COUNT: 13.9 % (ref 12.3–15.4)
GFR SERPL CREATININE-BSD FRML MDRD: 131 ML/MIN/1.73
GLOBULIN UR ELPH-MCNC: 3.4 GM/DL
GLUCOSE SERPL-MCNC: 194 MG/DL (ref 65–99)
GLUCOSE UR STRIP-MCNC: NEGATIVE MG/DL
HCT VFR BLD AUTO: 45.5 % (ref 37.5–51)
HGB BLD-MCNC: 14.8 G/DL (ref 13–17.7)
HGB UR QL STRIP.AUTO: ABNORMAL
HOLD SPECIMEN: NORMAL
HOLD SPECIMEN: NORMAL
HYALINE CASTS UR QL AUTO: ABNORMAL /LPF
IMM GRANULOCYTES # BLD AUTO: 0.05 10*3/MM3 (ref 0–0.05)
IMM GRANULOCYTES NFR BLD AUTO: 0.7 % (ref 0–0.5)
KETONES UR QL STRIP: NEGATIVE
LEUKOCYTE ESTERASE UR QL STRIP.AUTO: ABNORMAL
LIPASE SERPL-CCNC: 34 U/L (ref 13–60)
LYMPHOCYTES # BLD AUTO: 1.7 10*3/MM3 (ref 0.7–3.1)
LYMPHOCYTES NFR BLD AUTO: 22.1 % (ref 19.6–45.3)
MCH RBC QN AUTO: 30.3 PG (ref 26.6–33)
MCHC RBC AUTO-ENTMCNC: 32.5 G/DL (ref 31.5–35.7)
MCV RBC AUTO: 93.2 FL (ref 79–97)
MONOCYTES # BLD AUTO: 0.62 10*3/MM3 (ref 0.1–0.9)
MONOCYTES NFR BLD AUTO: 8.1 % (ref 5–12)
NEUTROPHILS NFR BLD AUTO: 4.82 10*3/MM3 (ref 1.7–7)
NEUTROPHILS NFR BLD AUTO: 62.6 % (ref 42.7–76)
NITRITE UR QL STRIP: POSITIVE
NRBC BLD AUTO-RTO: 0 /100 WBC (ref 0–0.2)
PH UR STRIP.AUTO: <=5 [PH] (ref 5–8)
PLATELET # BLD AUTO: 194 10*3/MM3 (ref 140–450)
PMV BLD AUTO: 10.2 FL (ref 6–12)
POTASSIUM SERPL-SCNC: 4.3 MMOL/L (ref 3.5–5.2)
PROT SERPL-MCNC: 7.5 G/DL (ref 6–8.5)
PROT UR QL STRIP: ABNORMAL
RBC # BLD AUTO: 4.88 10*6/MM3 (ref 4.14–5.8)
RBC # UR: ABNORMAL /HPF
REF LAB TEST METHOD: ABNORMAL
SODIUM SERPL-SCNC: 140 MMOL/L (ref 136–145)
SP GR UR STRIP: 1.02 (ref 1–1.03)
SQUAMOUS #/AREA URNS HPF: ABNORMAL /HPF
UROBILINOGEN UR QL STRIP: ABNORMAL
WBC # BLD AUTO: 7.69 10*3/MM3 (ref 3.4–10.8)
WBC UR QL AUTO: ABNORMAL /HPF
WHOLE BLOOD HOLD SPECIMEN: NORMAL
WHOLE BLOOD HOLD SPECIMEN: NORMAL

## 2020-12-22 PROCEDURE — 83605 ASSAY OF LACTIC ACID: CPT | Performed by: EMERGENCY MEDICINE

## 2020-12-22 PROCEDURE — 81001 URINALYSIS AUTO W/SCOPE: CPT | Performed by: EMERGENCY MEDICINE

## 2020-12-22 PROCEDURE — 83690 ASSAY OF LIPASE: CPT | Performed by: EMERGENCY MEDICINE

## 2020-12-22 PROCEDURE — 87086 URINE CULTURE/COLONY COUNT: CPT | Performed by: NURSE PRACTITIONER

## 2020-12-22 PROCEDURE — 96365 THER/PROPH/DIAG IV INF INIT: CPT

## 2020-12-22 PROCEDURE — 25010000002 CEFTRIAXONE PER 250 MG: Performed by: NURSE PRACTITIONER

## 2020-12-22 PROCEDURE — 85025 COMPLETE CBC W/AUTO DIFF WBC: CPT | Performed by: EMERGENCY MEDICINE

## 2020-12-22 PROCEDURE — 99283 EMERGENCY DEPT VISIT LOW MDM: CPT

## 2020-12-22 PROCEDURE — 80053 COMPREHEN METABOLIC PANEL: CPT | Performed by: EMERGENCY MEDICINE

## 2020-12-22 PROCEDURE — 74176 CT ABD & PELVIS W/O CONTRAST: CPT

## 2020-12-22 RX ORDER — SODIUM CHLORIDE 9 MG/ML
10 INJECTION INTRAVENOUS AS NEEDED
Status: DISCONTINUED | OUTPATIENT
Start: 2020-12-22 | End: 2020-12-22 | Stop reason: HOSPADM

## 2020-12-22 RX ORDER — HYDROCODONE BITARTRATE AND ACETAMINOPHEN 5; 325 MG/1; MG/1
1 TABLET ORAL EVERY 8 HOURS PRN
Qty: 10 TABLET | Refills: 0 | Status: SHIPPED | OUTPATIENT
Start: 2020-12-22 | End: 2021-02-24

## 2020-12-22 RX ORDER — CEFDINIR 300 MG/1
300 CAPSULE ORAL 2 TIMES DAILY
Qty: 14 CAPSULE | Refills: 0 | Status: SHIPPED | OUTPATIENT
Start: 2020-12-22 | End: 2021-01-04

## 2020-12-22 RX ADMIN — SODIUM CHLORIDE 1 G: 900 INJECTION INTRAVENOUS at 10:15

## 2020-12-22 RX ADMIN — SODIUM CHLORIDE 500 ML: 9 INJECTION, SOLUTION INTRAVENOUS at 10:20

## 2020-12-22 NOTE — TELEPHONE ENCOUNTER
Pt did go to ER this morning and was diagnosed with UTI and kidney stone.  Treated with abx and pain med and scheduled a follow up appt with urologist.

## 2020-12-22 NOTE — ED PROVIDER NOTES
Subjective   Patient presents to the emergency department with hematuria which began last night spontaneously.  This morning, he continued to have some hematuria accompanied by right flank pain, which is currently absent.  He also denies any abdominal pain.  He denies any dysuria, frequency or urgency or difficulty voiding.  He has a history of enlarged prostate and has routine nocturia but has never required intervention.  He takes Eliquis routinely for atrial fibrillation.  He takes no aspirin.  He has a remote history of kidney stone on a single occasion which required cystoscopy to remove.  This happened greater than 10 years ago.  Otherwise, he has had no abdominal surgery.  He has had no recent medication changes.    He has a past medical history of hypertension, hyperlipidemia and atrial fibrillation.  He has chronic renal insufficiency and type 2 diabetes.      History provided by:  Patient   used: No    Blood in Urine  This is a new problem. The current episode started yesterday. Progression since onset: The hematuria is persistent.  The right flank pain is currently absent and he describes it as intermittent since last night. He describes the hematuria as gross hematuria. He reports no clotting in his urine stream. His pain is at a severity of 0/10. The pain is moderate. He describes his urine color as dark red. Irritative symptoms include nocturia. Irritative symptoms do not include frequency or urgency. Obstructive symptoms include a slower stream. Obstructive symptoms do not include an intermittent stream. Associated symptoms include flank pain. Pertinent negatives include no abdominal pain, chills, dysuria, fever, genital pain, inability to urinate, nausea or vomiting. His past medical history is significant for BPH, hypertension and kidney stones. There is no history of recent infection.       Review of Systems   Constitutional: Negative for chills and fever.   Cardiovascular:  Negative for chest pain and leg swelling.   Gastrointestinal: Negative for abdominal pain, nausea and vomiting.   Endocrine:        Patient is a type II diabetic.   Genitourinary: Positive for flank pain, hematuria and nocturia. Negative for decreased urine volume, difficulty urinating, dysuria, enuresis, frequency, penile pain, penile swelling, scrotal swelling, testicular pain and urgency.        Review of available microbiology studies and urine culture showed no growth or normal urogenital gardenia   Skin: Negative for color change.   Hematological: Bruises/bleeds easily (on Eliquis; not aspirin ).   All other systems reviewed and are negative.      Past Medical History:   Diagnosis Date   • Atrial fibrillation (CMS/[facility])    • Chronic kidney disease    • Diabetes mellitus (CMS/[facility])    • GERD (gastroesophageal reflux disease)    • Gout    • History of colonoscopy 2012   • Hyperlipidemia    • Hypertension    • PAF (paroxysmal atrial fibrillation) (CMS/[facility])    • Prostatism    • Sleep apnea     CPAP HS       Allergies   Allergen Reactions   • Penicillins Hives   • Xarelto [Rivaroxaban]      GI bleed       Past Surgical History:   Procedure Laterality Date   • CARDIOVERSION     • CATARACT EXTRACTION Left    • KIDNEY STONE SURGERY         Family History   Problem Relation Age of Onset   • Alzheimer's disease Mother    • Cancer Father    • COPD Father        Social History     Socioeconomic History   • Marital status:      Spouse name: Not on file   • Number of children: Not on file   • Years of education: Not on file   • Highest education level: Not on file   Tobacco Use   • Smoking status: Former Smoker     Quit date: 1960     Years since quittin.6   • Smokeless tobacco: Never Used   • Tobacco comment: started at 13 yo.   Substance and Sexual Activity   • Alcohol use: No   • Drug use: No   • Sexual activity: Defer           Objective   Physical Exam  Vitals signs and nursing note reviewed.    Constitutional:       General: He is not in acute distress.     Appearance: Normal appearance. He is not ill-appearing.      Comments: Patient is an excellent historian.  He is resting comfortably.  He is hypertensive but otherwise in no acute distress   HENT:      Head: Normocephalic.      Right Ear: External ear normal.      Left Ear: External ear normal.      Nose: Nose normal.      Mouth/Throat:      Mouth: Mucous membranes are moist.      Pharynx: No oropharyngeal exudate.   Eyes:      Conjunctiva/sclera: Conjunctivae normal.   Neck:      Musculoskeletal: Normal range of motion and neck supple. No muscular tenderness.   Cardiovascular:      Rate and Rhythm: Normal rate and regular rhythm.      Heart sounds: No murmur.   Pulmonary:      Effort: Pulmonary effort is normal. No respiratory distress.      Breath sounds: Normal breath sounds.   Abdominal:      General: Bowel sounds are normal. There is no distension.      Palpations: Abdomen is soft.      Tenderness: There is no abdominal tenderness. There is no right CVA tenderness or left CVA tenderness.   Musculoskeletal: Normal range of motion.         General: No swelling, tenderness or deformity.   Skin:     General: Skin is warm and dry.      Capillary Refill: Capillary refill takes less than 2 seconds.      Coloration: Skin is not pale.      Findings: No lesion.   Neurological:      General: No focal deficit present.      Mental Status: He is alert and oriented to person, place, and time.      Comments: No Neurosensory deficit or focal weakness     Psychiatric:         Behavior: Behavior normal.         Thought Content: Thought content normal.         Procedures           ED Course  ED Course as of Dec 22 1317   Tue Dec 22, 2020   0920 RBC, UA(!): Too Numerous to Count [MS]   0920 WBC, UA(!): 13-20 [MS]   0920 Nitrite, UA(!): Positive [MS]   0920 Leukocytes, UA(!): Moderate (2+) [MS]   0920 Protein, UA(!): 100 mg/dL (2+) [MS]   0920 Blood, UA(!): Large (3+)  [MS]   1313 Patient has an empty bladder on a post void bladder scan.  His vital signs been stable throughout his ED course.  He is able to take and tolerate fluids well.  His pain is relieved.  We discussed in detail parameters for concern that would warrant return to the emergency department.  Mr. Camarena understands and concurs with this outpatient plan of care and close follow-up for urinary tract infection without sepsis.     [MS]      ED Course User Index  [MS] Nam Maribellorenzo Simmons, APRN      Recent Results (from the past 24 hour(s))   Urinalysis With Microscopic If Indicated (No Culture) - Urine, Clean Catch    Collection Time: 12/22/20  8:38 AM    Specimen: Urine, Clean Catch   Result Value Ref Range    Color, UA Red (A) Yellow, Straw    Appearance, UA Cloudy (A) Clear    pH, UA <=5.0 5.0 - 8.0    Specific Gravity, UA 1.017 1.001 - 1.030    Glucose, UA Negative Negative    Ketones, UA Negative Negative    Bilirubin, UA Negative Negative    Blood, UA Large (3+) (A) Negative    Protein,  mg/dL (2+) (A) Negative    Leuk Esterase, UA Moderate (2+) (A) Negative    Nitrite, UA Positive (A) Negative    Urobilinogen, UA 0.2 E.U./dL 0.2 - 1.0 E.U./dL   Urinalysis, Microscopic Only - Urine, Clean Catch    Collection Time: 12/22/20  8:38 AM    Specimen: Urine, Clean Catch   Result Value Ref Range    RBC, UA Too Numerous to Count (A) None Seen, 0-2 /HPF    WBC, UA 13-20 (A) None Seen, 0-2 /HPF    Bacteria, UA None Seen None Seen, Trace /HPF    Squamous Epithelial Cells, UA 0-2 None Seen, 0-2 /HPF    Hyaline Casts, UA None Seen 0 - 6 /LPF    Methodology Manual Light Microscopy    Comprehensive Metabolic Panel    Collection Time: 12/22/20  8:45 AM    Specimen: Blood   Result Value Ref Range    Glucose 194 (H) 65 - 99 mg/dL    BUN 20 8 - 23 mg/dL    Creatinine 0.59 (L) 0.76 - 1.27 mg/dL    Sodium 140 136 - 145 mmol/L    Potassium 4.3 3.5 - 5.2 mmol/L    Chloride 104 98 - 107 mmol/L    CO2 26.0 22.0 - 29.0 mmol/L     Calcium 9.4 8.6 - 10.5 mg/dL    Total Protein 7.5 6.0 - 8.5 g/dL    Albumin 4.10 3.50 - 5.20 g/dL    ALT (SGPT) 21 1 - 41 U/L    AST (SGOT) 23 1 - 40 U/L    Alkaline Phosphatase 92 39 - 117 U/L    Total Bilirubin 1.0 0.0 - 1.2 mg/dL    eGFR Non African Amer 131 >60 mL/min/1.73    Globulin 3.4 gm/dL    A/G Ratio 1.2 g/dL    BUN/Creatinine Ratio 33.9 (H) 7.0 - 25.0    Anion Gap 10.0 5.0 - 15.0 mmol/L   Lipase    Collection Time: 12/22/20  8:45 AM    Specimen: Blood   Result Value Ref Range    Lipase 34 13 - 60 U/L   Light Blue Top    Collection Time: 12/22/20  8:45 AM   Result Value Ref Range    Extra Tube hold for add-on    Green Top (Gel)    Collection Time: 12/22/20  8:45 AM   Result Value Ref Range    Extra Tube Hold for add-ons.    Lavender Top    Collection Time: 12/22/20  8:45 AM   Result Value Ref Range    Extra Tube hold for add-on    Gold Top - SST    Collection Time: 12/22/20  8:45 AM   Result Value Ref Range    Extra Tube Hold for add-ons.    CBC Auto Differential    Collection Time: 12/22/20  8:45 AM    Specimen: Blood   Result Value Ref Range    WBC 7.69 3.40 - 10.80 10*3/mm3    RBC 4.88 4.14 - 5.80 10*6/mm3    Hemoglobin 14.8 13.0 - 17.7 g/dL    Hematocrit 45.5 37.5 - 51.0 %    MCV 93.2 79.0 - 97.0 fL    MCH 30.3 26.6 - 33.0 pg    MCHC 32.5 31.5 - 35.7 g/dL    RDW 13.9 12.3 - 15.4 %    RDW-SD 47.3 37.0 - 54.0 fl    MPV 10.2 6.0 - 12.0 fL    Platelets 194 140 - 450 10*3/mm3    Neutrophil % 62.6 42.7 - 76.0 %    Lymphocyte % 22.1 19.6 - 45.3 %    Monocyte % 8.1 5.0 - 12.0 %    Eosinophil % 5.6 0.3 - 6.2 %    Basophil % 0.9 0.0 - 1.5 %    Immature Grans % 0.7 (H) 0.0 - 0.5 %    Neutrophils, Absolute 4.82 1.70 - 7.00 10*3/mm3    Lymphocytes, Absolute 1.70 0.70 - 3.10 10*3/mm3    Monocytes, Absolute 0.62 0.10 - 0.90 10*3/mm3    Eosinophils, Absolute 0.43 (H) 0.00 - 0.40 10*3/mm3    Basophils, Absolute 0.07 0.00 - 0.20 10*3/mm3    Immature Grans, Absolute 0.05 0.00 - 0.05 10*3/mm3    nRBC 0.0 0.0 - 0.2 /100  WBC   Lactic Acid, Plasma    Collection Time: 12/22/20  8:48 AM    Specimen: Blood   Result Value Ref Range    Lactate 1.8 0.5 - 2.0 mmol/L     Note: In addition to lab results from this visit, the labs listed above may include labs taken at another facility or during a different encounter within the last 24 hours. Please correlate lab times with ED admission and discharge times for further clarification of the services performed during this visit.    CT Abdomen Pelvis Without Contrast   Preliminary Result   No right hydronephrosis or right hydroureter with   nonobstructing left nephrolithiasis measuring 1-2 mm. Dilated appearance   of the left renal collecting system with ectasia of involvement or   contour similar to 2019 comparison examination, however, more dilated   from that exam throughout the distal left ureter without obstructing   calculus may represent recent passage of stone or inflammatory findings   such as pyelonephritis in appropriate clinical setting without   development of perinephric fluid collection as there is a simple   appearing left renal cortical cyst stable from prior. No bladder   calculi.       D:  12/22/2020   E:  12/22/2020                Vitals:    12/22/20 0930 12/22/20 1030 12/22/20 1100 12/22/20 1130   BP: 142/80 153/87 149/86 145/85   BP Location:       Patient Position:       Pulse:       Resp:       Temp:       TempSrc:       SpO2: 94% 96% 97% 95%   Weight:       Height:         Medications   Sodium Chloride (PF) 0.9 % 10 mL (has no administration in time range)   cefTRIAXone (ROCEPHIN) 1 g/100 mL 0.9% NS (MBP) (0 g Intravenous Stopped 12/22/20 1045)   sodium chloride 0.9 % bolus 500 mL (0 mL Intravenous Stopped 12/22/20 1050)     ECG/EMG Results (last 24 hours)     ** No results found for the last 24 hours. **        No orders to display       DISCHARGE    Patient discharged in stable condition.    Reviewed implications of results, diagnosis, meds, responsibility to follow up,  warning signs and symptoms of possible worsening, potential complications and reasons to return to ER.    Patient/Family voiced understanding of above instructions.    Discussed plan for discharge, as there is no emergent indication for admission.  Pt/family is agreeable and understands need for follow up and possible repeat testing.  Pt/family is aware that discharge does not mean that nothing is wrong but that it indicates no emergency is currently present that requires admission and they must continue care with follow-up as given below or with a physician of their choice.     FOLLOW-UP  Nickolas Rios MD  [address]   [address]  [place] [address]  [phone]               Medication List      New Prescriptions    cefdinir 300 MG capsule  Commonly known as: OMNICEF  Take 1 capsule by mouth 2 (Two) Times a Day.     HYDROcodone-acetaminophen 5-325 MG per tablet  Commonly known as: NORCO  Take 1 tablet by mouth Every 8 (Eight) Hours As Needed for Moderate Pain .           Where to Get Your Medications      These medications were sent to [facility] [address] [place] [address] - [address] [place] [address] - [phone]  - [phone] [place] [address]    Phone: [phone]   · cefdinir 300 MG capsule     You can get these medications from any pharmacy    Bring a paper prescription for each of these medications  · HYDROcodone-acetaminophen 5-325 MG per tablet           DISCHARGE    Patient discharged in stable condition.    Reviewed implications of results, diagnosis, meds, responsibility to follow up, warning signs and symptoms of possible worsening, potential complications and reasons to return to ER.    Patient/Family voiced understanding of above instructions.    Discussed plan for discharge, as there is no emergent indication for admission.  Pt/family is agreeable and understands need for follow up and possible repeat testing.  Pt/family is aware that discharge does not  mean that nothing is wrong but that it indicates no emergency is currently present that requires admission and they must continue care with follow-up as given below or with a physician of their choice.     FOLLOW-UP  Nickolas Rios MD  [address] [facility]  [address]  [place] [address]  [phone]               Medication List      New Prescriptions    cefdinir 300 MG capsule  Commonly known as: OMNICEF  Take 1 capsule by mouth 2 (Two) Times a Day.     HYDROcodone-acetaminophen 5-325 MG per tablet  Commonly known as: NORCO  Take 1 tablet by mouth Every 8 (Eight) Hours As Needed for Moderate Pain .           Where to Get Your Medications      These medications were sent to [facility] [address] [place] [address] - [address] [facility] - [phone]  - [phone] [place] [id number]    Phone: [phone]   · cefdinir 300 MG capsule     You can get these medications from any pharmacy    Bring a paper prescription for each of these medications  · HYDROcodone-acetaminophen 5-325 MG per tablet                                         MDM    Final diagnoses:   Urinary tract infection without hematuria, site unspecified            Maribel Browning, APRN  12/22/20 [id number]

## 2020-12-22 NOTE — TELEPHONE ENCOUNTER
Pt called stating he is on his way to the ER. Pt stated he is passing blood and it started last night. It started with pain in his back on his right side. Please advise

## 2020-12-22 NOTE — DISCHARGE INSTRUCTIONS
Medications as directed.  Follow-up with Dr. Rios, local urologist.  Call his office today for follow-up appointment.  Return to the emergency department as needed for worsening symptoms or concerns which include, but are not limited to: Fever, intractable pain, or intractable vomiting.  Thank you

## 2020-12-23 LAB — BACTERIA SPEC AEROBE CULT: NORMAL

## 2020-12-24 DIAGNOSIS — I48.0 PAROXYSMAL ATRIAL FIBRILLATION (HCC): ICD-10-CM

## 2020-12-27 ENCOUNTER — HOSPITAL ENCOUNTER (EMERGENCY)
Facility: HOSPITAL | Age: 85
Discharge: HOME | End: 2020-12-27
Attending: EMERGENCY MEDICINE | Admitting: EMERGENCY MEDICINE

## 2020-12-27 ENCOUNTER — APPOINTMENT (OUTPATIENT)
Dept: GENERAL RADIOLOGY | Facility: HOSPITAL | Age: 85
End: 2020-12-27

## 2020-12-27 VITALS
TEMPERATURE: 96.2 F | RESPIRATION RATE: 18 BRPM | SYSTOLIC BLOOD PRESSURE: 132 MMHG | HEART RATE: 62 BPM | OXYGEN SATURATION: 96 % | BODY MASS INDEX: 31.83 KG/M2 | DIASTOLIC BLOOD PRESSURE: 74 MMHG | WEIGHT: 256 LBS | HEIGHT: 75 IN

## 2020-12-27 DIAGNOSIS — I48.91 ATRIAL FIBRILLATION WITH RVR (HCC): Primary | ICD-10-CM

## 2020-12-27 LAB
ALBUMIN SERPL-MCNC: 3.8 G/DL (ref 3.5–5.2)
ALBUMIN/GLOB SERPL: 1.2 G/DL
ALP SERPL-CCNC: 86 U/L (ref 39–117)
ALT SERPL W P-5'-P-CCNC: 32 U/L (ref 1–41)
ANION GAP SERPL CALCULATED.3IONS-SCNC: 12 MMOL/L (ref 5–15)
AST SERPL-CCNC: 24 U/L (ref 1–40)
BASOPHILS # BLD AUTO: 0.08 10*3/MM3 (ref 0–0.2)
BASOPHILS NFR BLD AUTO: 0.9 % (ref 0–1.5)
BILIRUB SERPL-MCNC: 0.9 MG/DL (ref 0–1.2)
BUN SERPL-MCNC: 18 MG/DL (ref 8–23)
BUN/CREAT SERPL: 26.9 (ref 7–25)
CALCIUM SPEC-SCNC: 9 MG/DL (ref 8.6–10.5)
CHLORIDE SERPL-SCNC: 102 MMOL/L (ref 98–107)
CO2 SERPL-SCNC: 26 MMOL/L (ref 22–29)
CREAT SERPL-MCNC: 0.67 MG/DL (ref 0.76–1.27)
DEPRECATED RDW RBC AUTO: 46.6 FL (ref 37–54)
EOSINOPHIL # BLD AUTO: 0.31 10*3/MM3 (ref 0–0.4)
EOSINOPHIL NFR BLD AUTO: 3.6 % (ref 0.3–6.2)
ERYTHROCYTE [DISTWIDTH] IN BLOOD BY AUTOMATED COUNT: 13.5 % (ref 12.3–15.4)
GFR SERPL CREATININE-BSD FRML MDRD: 113 ML/MIN/1.73
GLOBULIN UR ELPH-MCNC: 3.3 GM/DL
GLUCOSE SERPL-MCNC: 139 MG/DL (ref 65–99)
HCT VFR BLD AUTO: 44.7 % (ref 37.5–51)
HGB BLD-MCNC: 15 G/DL (ref 13–17.7)
HOLD SPECIMEN: NORMAL
HOLD SPECIMEN: NORMAL
IMM GRANULOCYTES # BLD AUTO: 0.05 10*3/MM3 (ref 0–0.05)
IMM GRANULOCYTES NFR BLD AUTO: 0.6 % (ref 0–0.5)
LYMPHOCYTES # BLD AUTO: 1.97 10*3/MM3 (ref 0.7–3.1)
LYMPHOCYTES NFR BLD AUTO: 22.8 % (ref 19.6–45.3)
MAGNESIUM SERPL-MCNC: 1.8 MG/DL (ref 1.6–2.4)
MCH RBC QN AUTO: 31.4 PG (ref 26.6–33)
MCHC RBC AUTO-ENTMCNC: 33.6 G/DL (ref 31.5–35.7)
MCV RBC AUTO: 93.7 FL (ref 79–97)
MONOCYTES # BLD AUTO: 0.76 10*3/MM3 (ref 0.1–0.9)
MONOCYTES NFR BLD AUTO: 8.8 % (ref 5–12)
NEUTROPHILS NFR BLD AUTO: 5.47 10*3/MM3 (ref 1.7–7)
NEUTROPHILS NFR BLD AUTO: 63.3 % (ref 42.7–76)
NRBC BLD AUTO-RTO: 0 /100 WBC (ref 0–0.2)
NT-PROBNP SERPL-MCNC: 517.7 PG/ML (ref 0–1800)
PLATELET # BLD AUTO: 207 10*3/MM3 (ref 140–450)
PMV BLD AUTO: 10 FL (ref 6–12)
POTASSIUM SERPL-SCNC: 3.7 MMOL/L (ref 3.5–5.2)
PROT SERPL-MCNC: 7.1 G/DL (ref 6–8.5)
QT INTERVAL: 316 MS
QT INTERVAL: 402 MS
QTC INTERVAL: 424 MS
QTC INTERVAL: 480 MS
RBC # BLD AUTO: 4.77 10*6/MM3 (ref 4.14–5.8)
SODIUM SERPL-SCNC: 140 MMOL/L (ref 136–145)
TROPONIN T SERPL-MCNC: <0.01 NG/ML (ref 0–0.03)
TSH SERPL DL<=0.05 MIU/L-ACNC: 2.13 UIU/ML (ref 0.27–4.2)
WBC # BLD AUTO: 8.64 10*3/MM3 (ref 3.4–10.8)
WHOLE BLOOD HOLD SPECIMEN: NORMAL
WHOLE BLOOD HOLD SPECIMEN: NORMAL

## 2020-12-27 PROCEDURE — 71045 X-RAY EXAM CHEST 1 VIEW: CPT

## 2020-12-27 PROCEDURE — 99153 MOD SED SAME PHYS/QHP EA: CPT

## 2020-12-27 PROCEDURE — 92960 CARDIOVERSION ELECTRIC EXT: CPT

## 2020-12-27 PROCEDURE — 85025 COMPLETE CBC W/AUTO DIFF WBC: CPT

## 2020-12-27 PROCEDURE — 84443 ASSAY THYROID STIM HORMONE: CPT

## 2020-12-27 PROCEDURE — 93005 ELECTROCARDIOGRAM TRACING: CPT

## 2020-12-27 PROCEDURE — 80053 COMPREHEN METABOLIC PANEL: CPT

## 2020-12-27 PROCEDURE — 84484 ASSAY OF TROPONIN QUANT: CPT

## 2020-12-27 PROCEDURE — 83735 ASSAY OF MAGNESIUM: CPT

## 2020-12-27 PROCEDURE — 99152 MOD SED SAME PHYS/QHP 5/>YRS: CPT

## 2020-12-27 PROCEDURE — 93005 ELECTROCARDIOGRAM TRACING: CPT | Performed by: EMERGENCY MEDICINE

## 2020-12-27 PROCEDURE — 99284 EMERGENCY DEPT VISIT MOD MDM: CPT

## 2020-12-27 PROCEDURE — 83880 ASSAY OF NATRIURETIC PEPTIDE: CPT

## 2020-12-27 RX ORDER — SODIUM CHLORIDE 0.9 % (FLUSH) 0.9 %
10 SYRINGE (ML) INJECTION AS NEEDED
Status: DISCONTINUED | OUTPATIENT
Start: 2020-12-27 | End: 2020-12-27 | Stop reason: HOSPADM

## 2020-12-27 RX ADMIN — METHOHEXITAL SODIUM 70 MG: 500 INJECTION, POWDER, LYOPHILIZED, FOR SOLUTION INTRAMUSCULAR; INTRAVENOUS; RECTAL at 19:00

## 2020-12-27 NOTE — ED PROVIDER NOTES
EMERGENCY DEPARTMENT ENCOUNTER      Pt Name: Darien Camarena  MRN: [id number]  YOB: 1935  Date of evaluation: 12/27/2020  Provider: Bello Blakely DO    CHIEF COMPLAINT       Chief Complaint   Patient presents with   • Atrial Fibrillation         HISTORY OF PRESENT ILLNESS  (Location/Symptom, Timing/Onset, Context/Setting, Quality, Duration, Modifying Factors, Severity.)   Darien Camarena is a 85 y.o. male who presents to the emergency department for evaluation of recurrent atrial fibrillation.  The patient notes a history of similar takes Eliquis has been compliant with his medications, he felt himself go back into atrial fibrillation with a rapid response earlier today prior to arrival.  Denies any chest pain associated with the incident or difficulty breathing.  Patient follows with his cardiologist, Dr. Oliveros.  Patient stopped his Eliquis for a couple days this past week but is since been on and has been compliant with his doses.  No fever, chills, no recent illness, no sputum production.  Denies any headache, unilateral weakness, numbness or tingling.  He has no other acute systemic complaints at this time.  No nausea, vomiting, diarrhea      Nursing notes were reviewed.    REVIEW OF SYSTEMS    (2-9 systems for level 4, 10 or more for level 5)   ROS:  General:  No fevers, no chills, no weakness  Cardiovascular:  No chest pain, + palpitations  Respiratory:  No shortness of breath, no cough, no wheezing  Gastrointestinal:  No pain, no nausea, no vomiting, no diarrhea  Musculoskeletal:  No muscle pain, no joint pain  Skin:  No rash, no easy bruising  Neurologic:  No speech problems, no headache, no extremity numbness, no extremity tingling, no extremity weakness  Psychiatric:  No anxiety  Genitourinary:  No dysuria, no hematuria    Except as noted above the remainder of the review of systems was reviewed and negative.       PAST MEDICAL HISTORY     Past Medical History:   Diagnosis  Date   • Atrial fibrillation (CMS/HCC)    • Chronic kidney disease    • Diabetes mellitus (CMS/[facility])    • GERD (gastroesophageal reflux disease)    • Gout    • History of colonoscopy 09/12/2012   • Hyperlipidemia    • Hypertension    • PAF (paroxysmal atrial fibrillation) (CMS/[facility])    • Prostatism    • Sleep apnea     CPAP HS         SURGICAL HISTORY       Past Surgical History:   Procedure Laterality Date   • CARDIOVERSION     • CATARACT EXTRACTION Left    • KIDNEY STONE SURGERY           CURRENT MEDICATIONS       Current Facility-Administered Medications:   •  sodium chloride 0.9 % flush 10 mL, 10 mL, Intravenous, PRN, Bello Blakely,     Current Outpatient Medications:   •  allopurinol (ZYLOPRIM) 300 MG tablet, Take 1 tablet by mouth once daily, Disp: 90 tablet, Rfl: 1  •  amLODIPine (NORVASC) 10 MG tablet, Take 1 tablet by mouth once daily, Disp: 90 tablet, Rfl: 0  •  apixaban (Eliquis) 5 MG tablet tablet, Take 1 tablet by mouth Every 12 (Twelve) Hours., Disp: 180 tablet, Rfl: 1  •  Ascorbic Acid (VITAMIN C) 500 MG capsule, Take 1 tablet by mouth 4 (four) times a day., Disp: , Rfl:   •  cefdinir (OMNICEF) 300 MG capsule, Take 1 capsule by mouth 2 (Two) Times a Day., Disp: 14 capsule, Rfl: 0  •  docusate sodium (COLACE) 100 MG capsule, Take 300 mg by mouth every night., Disp: , Rfl:   •  Ferrous Gluconate (IRON) 240 (27 FE) MG tablet, Take 1 tablet by mouth daily., Disp: , Rfl:   •  finasteride (PROSCAR) 5 MG tablet, TAKE 1 TABLET BY MOUTH AT NIGHT, Disp: 90 tablet, Rfl: 1  •  hydroCHLOROthiazide (MICROZIDE) 12.5 MG capsule, Take 1 capsule by mouth once daily in the morning, Disp: 90 capsule, Rfl: 0  •  HYDROcodone-acetaminophen (NORCO) 5-325 MG per tablet, Take 1 tablet by mouth Every 8 (Eight) Hours As Needed for Moderate Pain ., Disp: 10 tablet, Rfl: 0  •  lisinopril (PRINIVIL,ZESTRIL) 40 MG tablet, Take 1 tablet by mouth once daily, Disp: 90 tablet, Rfl: 2  •  metFORMIN (GLUCOPHAGE) 1000 MG tablet,  Take 1 tablet by mouth twice daily, Disp: 180 tablet, Rfl: 1  •  methenamine (HIPREX) 1 g tablet, TAKE ONE-HALF TABLET BY MOUTH TWICE DAILY WITH A MEAL, Disp: 90 tablet, Rfl: 1  •  omeprazole (priLOSEC) 20 MG capsule, Take 1 capsule by mouth once daily, Disp: 90 capsule, Rfl: 1  •  pravastatin (PRAVACHOL) 40 MG tablet, Take 1 tablet by mouth once daily, Disp: 90 tablet, Rfl: 3  •  Probiotic Product (PROBIOTIC ADVANCED PO), Take 1 tablet by mouth 2 (Two) Times a Day., Disp: , Rfl:   •  sotalol (BETAPACE) 120 MG tablet, Take 1 tablet by mouth 2 (Two) Times a Day., Disp: 180 tablet, Rfl: 1  •  tamsulosin (FLOMAX) 0.4 MG capsule 24 hr capsule, Take 1 capsule by mouth once daily, Disp: 90 capsule, Rfl: 1    ALLERGIES     Penicillins and Xarelto [rivaroxaban]    FAMILY HISTORY       Family History   Problem Relation Age of Onset   • Alzheimer's disease Mother    • Cancer Father    • COPD Father           SOCIAL HISTORY       Social History     Socioeconomic History   • Marital status:      Spouse name: Not on file   • Number of children: Not on file   • Years of education: Not on file   • Highest education level: Not on file   Tobacco Use   • Smoking status: Former Smoker     Quit date: 1960     Years since quittin.6   • Smokeless tobacco: Never Used   • Tobacco comment: started at 15 yo.   Substance and Sexual Activity   • Alcohol use: No   • Drug use: No   • Sexual activity: Defer         PHYSICAL EXAM    (up to 7 for level 4, 8 or more for level 5)     Vitals:    20 1858 20 1900 20 1901 20   BP: 136/79 136/79 136/79 126/78   BP Location: Right arm Right arm Right arm    Patient Position: Lying Lying Lying    Pulse: 74 66 64 63   Resp: 18 18 18 (P) 16   Temp:       SpO2: 99% 99% 98% 96%   Weight:       Height:           Physical Exam  General : Patient is awake, alert, oriented, in no acute distress, nontoxic appearing  HEENT: Pupils are equally round and reactive to light,  EOMI, conjunctivae clear, sclerae white, there is no injection no icterus.  Oral mucosa is moist, no exudate. Uvula is midline  Neck: Neck is supple, full range of motion, trachea midline  Cardiac: Heart tachycardic, irregularly irregular no murmurs, rubs, or gallops  Lungs: Lungs are clear to auscultation, there is no wheezing, rhonchi, or rales. There is no use of accessory muscles  Chest wall: There is no tenderness to palpation over the chest wall or over ribs  Abdomen: Abdomen is soft, nontender, nondistended. There are no firm or pulsatile masses, no rebound rigidity or guarding.   Musculoskeletal: 5 out of 5 strength in all 4 extremities.  No focal muscle deficits are appreciated  Neuro: Motor intact, sensory intact, level of consciousness is normal, GCS 15  Dermatology: Skin is warm and dry  Psych: Mentation is grossly normal, cognition is grossly normal. Affect is appropriate.      DIAGNOSTIC RESULTS     EKG: All EKG's are interpreted by the Emergency Department Physician who either signs or Co-signs this chart in the absence of a cardiologist.    ECG 12 Lead   Final Result   Test Reason : post cardioversion   Blood Pressure : **/** mmHG   Vent. Rate : 067 BPM     Atrial Rate : 067 BPM      P-R Int : 184 ms          QRS Dur : 090 ms       QT Int : 402 ms       P-R-T Axes : 068 049 023 degrees      QTc Int : 424 ms      Normal sinus rhythm   Normal ECG   When compared with ECG of 27-DEC-2020 16:24,   Sinus rhythm has replaced Atrial fibrillation   Vent. rate has decreased BY  72 BPM   Confirmed by PRETTY LENTZ MD ([id number]) on 12/27/2020 7:13:09 PM      Referred By:  edmd           Confirmed By:PRETTY LENTZ MD      ECG 12 Lead   Final Result   Test Reason : Dysrhythmia triage protocol   Blood Pressure : **/** mmHG   Vent. Rate : 139 BPM     Atrial Rate : 119 BPM      P-R Int : 000 ms          QRS Dur : 086 ms       QT Int : 316 ms       P-R-T Axes : 000 050 -29 degrees      QTc Int : 480 ms      Atrial  fibrillation with rapid ventricular response   Nonspecific ST abnormality   Abnormal QRS-T angle, consider primary T wave abnormality   Abnormal ECG   When compared with ECG of 21-JUL-2020 14:10,   T wave inversion less evident in Inferior leads   Confirmed by PRETTY LENTZ MD ([id number]) on 12/27/2020 6:16:25 PM      Referred By:  ED MD           Confirmed By:PRETTY LENTZ MD          RADIOLOGY:   Non-plain film images such as CT, Ultrasound and MRI are read by the radiologist. Plain radiographic images are visualized and preliminarily interpreted by the emergency physician with the below findings:      [] Radiologist's Report Reviewed:  XR Chest 1 View   Preliminary Result   Mild pulmonary venous hypertension. Stable chronic-appearing   lung changes. No clearly acute chest disease is seen.        DICTATED:   12/27/2020   EDITED/ls :   12/27/2020                 ED BEDSIDE ULTRASOUND:   Performed by ED Physician - none    LABS:    I have reviewed and interpreted all of the currently available lab results from this visit (if applicable):  Results for orders placed or performed during the hospital encounter of 12/27/20   Comprehensive Metabolic Panel    Specimen: Blood   Result Value Ref Range    Glucose 139 (H) 65 - 99 mg/dL    BUN 18 8 - 23 mg/dL    Creatinine 0.67 (L) 0.76 - 1.27 mg/dL    Sodium 140 136 - 145 mmol/L    Potassium 3.7 3.5 - 5.2 mmol/L    Chloride 102 98 - 107 mmol/L    CO2 26.0 22.0 - 29.0 mmol/L    Calcium 9.0 8.6 - 10.5 mg/dL    Total Protein 7.1 6.0 - 8.5 g/dL    Albumin 3.80 3.50 - 5.20 g/dL    ALT (SGPT) 32 1 - 41 U/L    AST (SGOT) 24 1 - 40 U/L    Alkaline Phosphatase 86 39 - 117 U/L    Total Bilirubin 0.9 0.0 - 1.2 mg/dL    eGFR Non African Amer 113 >60 mL/min/1.73    Globulin 3.3 gm/dL    A/G Ratio 1.2 g/dL    BUN/Creatinine Ratio 26.9 (H) 7.0 - 25.0    Anion Gap 12.0 5.0 - 15.0 mmol/L   Magnesium    Specimen: Blood   Result Value Ref Range    Magnesium 1.8 1.6 - 2.4 mg/dL   Troponin     Specimen: Blood   Result Value Ref Range    Troponin T <0.010 0.000 - 0.030 ng/mL   TSH    Specimen: Blood   Result Value Ref Range    TSH 2.130 0.270 - 4.200 uIU/mL   BNP    Specimen: Blood   Result Value Ref Range    proBNP 517.7 0.0-1,800.0 pg/mL   CBC Auto Differential    Specimen: Blood   Result Value Ref Range    WBC 8.64 3.40 - 10.80 10*3/mm3    RBC 4.77 4.14 - 5.80 10*6/mm3    Hemoglobin 15.0 13.0 - 17.7 g/dL    Hematocrit 44.7 37.5 - 51.0 %    MCV 93.7 79.0 - 97.0 fL    MCH 31.4 26.6 - 33.0 pg    MCHC 33.6 31.5 - 35.7 g/dL    RDW 13.5 12.3 - 15.4 %    RDW-SD 46.6 37.0 - 54.0 fl    MPV 10.0 6.0 - 12.0 fL    Platelets 207 140 - 450 10*3/mm3    Neutrophil % 63.3 42.7 - 76.0 %    Lymphocyte % 22.8 19.6 - 45.3 %    Monocyte % 8.8 5.0 - 12.0 %    Eosinophil % 3.6 0.3 - 6.2 %    Basophil % 0.9 0.0 - 1.5 %    Immature Grans % 0.6 (H) 0.0 - 0.5 %    Neutrophils, Absolute 5.47 1.70 - 7.00 10*3/mm3    Lymphocytes, Absolute 1.97 0.70 - 3.10 10*3/mm3    Monocytes, Absolute 0.76 0.10 - 0.90 10*3/mm3    Eosinophils, Absolute 0.31 0.00 - 0.40 10*3/mm3    Basophils, Absolute 0.08 0.00 - 0.20 10*3/mm3    Immature Grans, Absolute 0.05 0.00 - 0.05 10*3/mm3    nRBC 0.0 0.0 - 0.2 /100 WBC   ECG 12 Lead   Result Value Ref Range    QT Interval 316 ms    QTC Interval 480 ms   ECG 12 Lead   Result Value Ref Range    QT Interval 402 ms    QTC Interval 424 ms   Light Blue Top   Result Value Ref Range    Extra Tube hold for add-on    Green Top (Gel)   Result Value Ref Range    Extra Tube Hold for add-ons.    Lavender Top   Result Value Ref Range    Extra Tube hold for add-on    Gold Top - SST   Result Value Ref Range    Extra Tube Hold for add-ons.         All other labs were within normal range or not returned as of this dictation.      EMERGENCY DEPARTMENT COURSE and DIFFERENTIAL DIAGNOSIS/MDM:   Vitals:    Vitals:    12/27/20 1858 12/27/20 1900 12/27/20 1901 12/27/20 2000   BP: 136/79 136/79 136/79 126/78   BP Location: Right arm  Right arm Right arm    Patient Position: Lying Lying Lying    Pulse: 74 66 64 63   Resp: 18 18 18 (P) 16   Temp:       SpO2: 99% 99% 98% 96%   Weight:       Height:                Patient with atrial fibrillation with rapid ventricular response prior to arrival with a history of same.  He is on chronic anticoagulations been compliant with his medications recently.  On arrival no significant chest pain, no altered state, place and was not placed on cardiac monitor, IV and labs obtained.  We did proceed with electrical cardioversion which was successful.  Monitoring in the department afterwards patient remained stable in a normal sinus rhythm.  We will continue with his home therapies previously prescribed plan for him to follow-up with his cardiologist he will call in the morning for reevaluation.  Return precautions discussed. I had a discussion with the patient/family regarding diagnosis, diagnostic results, treatment plan, and medications.  The patient/family indicated understanding of these instructions.  I spent adequate time at the bedside proceeding discharge necessary to personally discuss the aftercare instructions, giving patient education, providing explanations of the results of our evaluations/findings, and my decision making to assure that the patient/family understand the plan of care.  Time was allotted to answer questions at that time and throughout the ED course.  Emphasis was placed on timely follow-up after discharge.  I also discussed the potential for the development of an acute emergent condition requiring further evaluation, admission, or even surgical intervention. I discussed that we found nothing during the visit today indicating the need for further workup, admission, or the presence of an unstable medical condition.  I encouraged the patient to return to the emergency department immediately for ANY concerns, worsening, new complaints, or if symptoms persist and unable to seek follow-up in  a timely fashion.  The patient/family expressed understanding and agreement with this plan.  The patient will follow-up with their PCP in 1-2 days for reevaluation.       MEDICATIONS ADMINISTERED IN ED:  Medications   sodium chloride 0.9 % flush 10 mL (has no administration in time range)   methohexital (BREVITAL) injection solution prefilled syringe 100 mg (70 mg Intravenous Given 12/27/20 1900)       PROCEDURES:  Procedures   Procedure Note - Procedural Sedation:  The benefits, risks, and alternatives of procedural sedation were discussed with the patient. Questions were sought and answered. Written consent was obtained for the procedure. Oxygen was administered and the appropriate pre-procedural policies were followed. Cardiac, oxygenation and blood pressure monitoring occurred.    Airway Assessment: Class 2    Prior to sedation a time out with nursing was called.     Darien Camarena was given a total of 75ml of Brevitol and adequate procedural sedation was achieved. The patient tolerated the procedure without complications. Darien Camarena regained consciousness as expected and has recovered to their baseline mental status.    25 minutes of intra-service time was provided.      Procedure Note - Cardioversion:  The benefits, risks, and alternatives of procedural sedation were discussed with the patient. Questions were sought and answered. Written consent was given for the procedure. Oxygen was administered and the appropriate pre-procedural policies were followed. Cardiac, oxygenation and blood pressure monitoring occurred. Prior to cardioversion a time out with nursing was called.    Darien Camarena was given sedative for adequate procedural sedation. It was confirmed that the defibrillator was set for synchronized cardioversion and Darien Camarena was administered 120 joules. The rhythm was completely successful converted to normal sinus rhythm.The patient tolerated the procedure without  complications. Darien Camarena regained consciousness s/p sedation as expected and is now back to their baseline mental status.      CRITICAL CARE TIME    Total Critical Care time was 40 minutes, excluding separately reportable procedures.  Atrial fibrillation rapid ventricular response requiring multiple interventions, monitoring and reevaluation.  There was a high probability of clinically significant/life threatening deterioration in the patient's condition which required my urgent intervention.      FINAL IMPRESSION      1. Atrial fibrillation with RVR (CMS/HCC)          DISPOSITION/PLAN     ED Disposition     ED Disposition Condition Comment    Discharge Stable           PATIENT REFERRED TO:  Titus Oliveros IV, MD  [address] [facility]  [address]  [facility] [address]  [phone]    Schedule an appointment as soon as possible for a visit   Your cardiologist    Pito Way MD  [address] [facility]  [address]  [facility] [id number] [phone]    In 2 days      [facility] Emergency Department  [address] [facility] [address] [phone]    If symptoms worsen      DISCHARGE MEDICATIONS:     Medication List      CONTINUE taking these medications    allopurinol 300 MG tablet  Commonly known as: ZYLOPRIM  Take 1 tablet by mouth once daily     amLODIPine 10 MG tablet  Commonly known as: NORVASC  Take 1 tablet by mouth once daily     apixaban 5 MG tablet tablet  Commonly known as: Eliquis  Take 1 tablet by mouth Every 12 (Twelve) Hours.     cefdinir 300 MG capsule  Commonly known as: OMNICEF  Take 1 capsule by mouth 2 (Two) Times a Day.     docusate sodium 100 MG capsule  Commonly known as: COLACE     finasteride 5 MG tablet  Commonly known as: PROSCAR  TAKE 1 TABLET BY MOUTH AT NIGHT     hydroCHLOROthiazide 12.5 MG capsule  Commonly known as: MICROZIDE  Take 1 capsule by mouth once daily in the morning     HYDROcodone-acetaminophen 5-325 MG per  tablet  Commonly known as: NORCO  Take 1 tablet by mouth Every 8 (Eight) Hours As Needed for Moderate Pain .     Iron 240 (27 Fe) MG tablet     lisinopril 40 MG tablet  Commonly known as: PRINIVIL,ZESTRIL  Take 1 tablet by mouth once daily     metFORMIN 1000 MG tablet  Commonly known as: GLUCOPHAGE  Take 1 tablet by mouth twice daily     methenamine 1 g tablet  Commonly known as: HIPREX  TAKE ONE-HALF TABLET BY MOUTH TWICE DAILY WITH A MEAL     omeprazole 20 MG capsule  Commonly known as: priLOSEC  Take 1 capsule by mouth once daily     pravastatin 40 MG tablet  Commonly known as: PRAVACHOL  Take 1 tablet by mouth once daily     PROBIOTIC ADVANCED PO     sotalol 120 MG tablet  Commonly known as: BETAPACE  Take 1 tablet by mouth 2 (Two) Times a Day.     tamsulosin 0.4 MG capsule 24 hr capsule  Commonly known as: FLOMAX  Take 1 capsule by mouth once daily     Vitamin C 500 MG capsule                Comment: Please note this report has been produced using speech recognition software.      Bello Blakely DO  Attending Emergency Physician               Bello lBakely DO  12/27/20 1897

## 2020-12-28 ENCOUNTER — TELEPHONE (OUTPATIENT)
Dept: INTERNAL MEDICINE | Facility: CLINIC | Age: 85
End: 2020-12-28

## 2020-12-28 RX ORDER — APIXABAN 5 MG/1
TABLET, FILM COATED ORAL
Qty: 180 TABLET | Refills: 3 | Status: SHIPPED | OUTPATIENT
Start: 2020-12-28 | End: 2021-04-14 | Stop reason: SDUPTHER

## 2020-12-28 NOTE — TELEPHONE ENCOUNTER
Patient daughter called and stated pt was discharged on 12/22 from [facility] for passing blood in urine and is needing to be seen as soon as possible. Please advise

## 2021-01-04 ENCOUNTER — OFFICE VISIT (OUTPATIENT)
Dept: INTERNAL MEDICINE | Facility: CLINIC | Age: 86
End: 2021-01-04

## 2021-01-04 VITALS
DIASTOLIC BLOOD PRESSURE: 64 MMHG | HEART RATE: 80 BPM | RESPIRATION RATE: 20 BRPM | WEIGHT: 261 LBS | BODY MASS INDEX: 32.62 KG/M2 | SYSTOLIC BLOOD PRESSURE: 140 MMHG | TEMPERATURE: 98.4 F

## 2021-01-04 DIAGNOSIS — R10.9 RIGHT FLANK PAIN: ICD-10-CM

## 2021-01-04 DIAGNOSIS — N20.0 NEPHROLITHIASIS: ICD-10-CM

## 2021-01-04 DIAGNOSIS — I48.0 PAROXYSMAL ATRIAL FIBRILLATION (HCC): Primary | ICD-10-CM

## 2021-01-04 DIAGNOSIS — R82.998 LEUKOCYTES IN URINE: ICD-10-CM

## 2021-01-04 LAB
BILIRUB BLD-MCNC: NEGATIVE MG/DL
CLARITY, POC: ABNORMAL
COLOR UR: ABNORMAL
EXPIRATION DATE: ABNORMAL
GLUCOSE UR STRIP-MCNC: NEGATIVE MG/DL
KETONES UR QL: NEGATIVE
LEUKOCYTE EST, POC: ABNORMAL
Lab: ABNORMAL
NITRITE UR-MCNC: NEGATIVE MG/ML
PH UR: 5 [PH] (ref 5–8)
PROT UR STRIP-MCNC: ABNORMAL MG/DL
RBC # UR STRIP: ABNORMAL /UL
SP GR UR: 1.01 (ref 1–1.03)
UROBILINOGEN UR QL: ABNORMAL

## 2021-01-04 PROCEDURE — 87077 CULTURE AEROBIC IDENTIFY: CPT | Performed by: INTERNAL MEDICINE

## 2021-01-04 PROCEDURE — 99214 OFFICE O/P EST MOD 30 MIN: CPT | Performed by: INTERNAL MEDICINE

## 2021-01-04 PROCEDURE — 87086 URINE CULTURE/COLONY COUNT: CPT | Performed by: INTERNAL MEDICINE

## 2021-01-04 PROCEDURE — 87186 SC STD MICRODIL/AGAR DIL: CPT | Performed by: INTERNAL MEDICINE

## 2021-01-04 PROCEDURE — 81003 URINALYSIS AUTO W/O SCOPE: CPT | Performed by: INTERNAL MEDICINE

## 2021-01-04 PROCEDURE — 87081 CULTURE SCREEN ONLY: CPT | Performed by: INTERNAL MEDICINE

## 2021-01-04 NOTE — PROGRESS NOTES
Subjective       Darien Camarena is a 85 y.o. male.     Chief Complaint   Patient presents with   • Nephrolithiasis     [facility] ER follow up  on 12/22/2020     • Urinary Tract Infection       History obtained from the patient.      History of Present Illness     This is a patient of Dr. Way.    The patient is here for an ED follow-up.  He was initially seen at [facility] ED on 12/22/2020 for gross hematuria and right flank pain.  Blood pressure was 152/94.  The patient has a history of Atrial Fibrillation, on Eliquis.  Urinalysis showed large blood, nitrite positive, and moderate leukocytes.  Urine microscopy showed 13-20 white blood cells and TNTC red blood cells.  Culture showed greater than 100,000 mixed gardenia.  The patient had an empty bladder on postvoid bladder scan.  Labs showed a normal CMP, CBC, lipase, and lactic acid with the exception of an elevated glucose (194).  CT abdomen and pelvis showed a nonobstructing left nephrolith and a dilated left renal collecting system.  This was similar to previous CT in 2019 with the exception of more extensive dilation throughout the distal left ureter without obstructing calculus (?  passed stone or pyelonephritis).  He was given IV Rocephin and IV fluids and discharged on Cefdinir and Norco.  He states he has completed the antibiotics.  He is complaining of mild right flank pain, but no other  symptoms as noted in the ROS.  The patient states he has an appointment with Dr. Rios on 1/5/2020.    The patient was seen again at [facility] ED on 12/27/2020 for recurrent atrial fibrillation with rapid ventricular response.  Blood pressure was 132/74.  The patient was not having any chest pain or shortness of breath.  CBC, CMP, TSH, magnesium, and BNP  were normal with the exception of elevated glucose (139).  Troponin was negative.  Chest x-ray showed mild pulmonary venous hypertension and stable chronic appearing lung changes.  EKG showed atrial  fibrillation with rapid ventricular response.  He was cardioverted with sedation.  Postprocedural EKG showed normal sinus rhythm. His Eliquis, Amlodipine, and Sotalol medications were continued at the current dosages.  The patient denies any current cardiac symptoms as noted in the ROS.  He has an appointment with BERYL Paiz for Dr. Oliveros, on 2/16/2021.        Current Outpatient Medications on File Prior to Visit   Medication Sig Dispense Refill   • allopurinol (ZYLOPRIM) 300 MG tablet Take 1 tablet by mouth once daily 90 tablet 1   • amLODIPine (NORVASC) 10 MG tablet Take 1 tablet by mouth once daily 90 tablet 0   • Ascorbic Acid (VITAMIN C) 500 MG capsule Take 1 tablet by mouth 4 (four) times a day.     • docusate sodium (COLACE) 100 MG capsule Take 300 mg by mouth every night.     • Eliquis 5 MG tablet tablet TAKE 1 TABLET BY MOUTH TWICE DAILY 180 tablet 3   • Ferrous Gluconate (IRON) 240 (27 FE) MG tablet Take 1 tablet by mouth daily.     • finasteride (PROSCAR) 5 MG tablet TAKE 1 TABLET BY MOUTH AT NIGHT 90 tablet 1   • hydroCHLOROthiazide (MICROZIDE) 12.5 MG capsule Take 1 capsule by mouth once daily in the morning 90 capsule 0   • HYDROcodone-acetaminophen (NORCO) 5-325 MG per tablet Take 1 tablet by mouth Every 8 (Eight) Hours As Needed for Moderate Pain . 10 tablet 0   • lisinopril (PRINIVIL,ZESTRIL) 40 MG tablet Take 1 tablet by mouth once daily 90 tablet 2   • metFORMIN (GLUCOPHAGE) 1000 MG tablet Take 1 tablet by mouth twice daily 180 tablet 1   • methenamine (HIPREX) 1 g tablet TAKE ONE-HALF TABLET BY MOUTH TWICE DAILY WITH A MEAL 90 tablet 1   • omeprazole (priLOSEC) 20 MG capsule Take 1 capsule by mouth once daily 90 capsule 1   • pravastatin (PRAVACHOL) 40 MG tablet Take 1 tablet by mouth once daily 90 tablet 3   • Probiotic Product (PROBIOTIC ADVANCED PO) Take 1 tablet by mouth 2 (Two) Times a Day.     • sotalol (BETAPACE) 120 MG tablet Take 1 tablet by mouth 2 (Two) Times a Day. 180  tablet 1   • tamsulosin (FLOMAX) 0.4 MG capsule 24 hr capsule Take 1 capsule by mouth once daily 90 capsule 1     No current facility-administered medications on file prior to visit.        Current outpatient and discharge medications have been reconciled for the patient.  Reviewed by: Radha Zaldivar MD        The following portions of the patient's history were reviewed and updated as appropriate: allergies, current medications, past family history, past medical history, past social history, past surgical history and problem list.    Review of Systems   Constitutional: Positive for fever (tactile, 2 days ago). Negative for chills, fatigue and unexpected weight change.   Eyes: Negative for visual disturbance.   Respiratory: Negative for cough, shortness of breath and wheezing.    Cardiovascular: Negative for chest pain, palpitations and leg swelling.        No JIMENES, orthopnea, or claudication.   Gastrointestinal: Negative for abdominal pain, blood in stool, constipation, diarrhea, nausea and vomiting.        Denies melena.   Endocrine: Negative for polydipsia and polyuria.   Genitourinary: Positive for flank pain. Negative for dysuria, frequency, hematuria and urgency.   Musculoskeletal: Negative for arthralgias and myalgias.   Neurological: Negative for dizziness, syncope, light-headedness and headaches.        No memory issues.   Psychiatric/Behavioral: Negative for decreased concentration.         Objective       Blood pressure 140/64, pulse 80, temperature 98.4 °F (36.9 °C), temperature source Temporal, resp. rate 20, weight 118 kg (261 lb).      Physical Exam  Vitals signs and nursing note reviewed.   Constitutional:       Appearance: He is well-developed. He is obese.   Neck:      Musculoskeletal: Normal range of motion and neck supple.      Thyroid: No thyroid mass or thyromegaly.      Vascular: No carotid bruit.   Cardiovascular:      Rate and Rhythm: Normal rate and regular rhythm.      Pulses: Normal  pulses.      Heart sounds: Normal heart sounds. No murmur. No friction rub. No gallop.    Pulmonary:      Effort: Pulmonary effort is normal.      Breath sounds: Normal breath sounds.   Abdominal:      General: Bowel sounds are normal. There is no distension or abdominal bruit.      Palpations: Abdomen is soft. There is no hepatomegaly, splenomegaly or mass.      Tenderness: There is no abdominal tenderness.   Musculoskeletal:      Right lower leg: No edema.      Left lower leg: No edema.   Neurological:      Mental Status: He is alert.   Psychiatric:         Mood and Affect: Mood normal.         Assessment / Plan:  Diagnoses and all orders for this visit:    1. Paroxysmal atrial fibrillation (CMS/HCC) (Primary)   Continue current medication(s) as noted in the history of present illness.   Follow-up with Cardiology.    2. Nephrolithiasis   Follow-up with Urology.    3. Right flank pain  -     POC Urinalysis Dipstick, Automated    4. Leukocytes in urine  -     Urine Culture - Urine, Urine, Clean Catch          Return for Next scheduled follow up.

## 2021-01-05 ENCOUNTER — TELEPHONE (OUTPATIENT)
Dept: INTERNAL MEDICINE | Facility: CLINIC | Age: 86
End: 2021-01-05

## 2021-01-05 DIAGNOSIS — N39.0 ACUTE UTI: Primary | ICD-10-CM

## 2021-01-05 RX ORDER — SULFAMETHOXAZOLE AND TRIMETHOPRIM 800; 160 MG/1; MG/1
1 TABLET ORAL 2 TIMES DAILY
Qty: 14 TABLET | Refills: 0 | Status: SHIPPED | OUTPATIENT
Start: 2021-01-05 | End: 2021-01-12

## 2021-01-05 NOTE — TELEPHONE ENCOUNTER
Call patient please.  His urine culture from yesterday is growing a bacteria, indicating he has a UTI.  I have sent a prescription to the pharmacy for Bactrim DS.

## 2021-01-05 NOTE — TELEPHONE ENCOUNTER
S/w pt to advise him on Urine culture and medication being sent in. He verbalized understanding and appreciation. Also advised him to contact office if symptoms persist or worsen.

## 2021-01-07 ENCOUNTER — TELEPHONE (OUTPATIENT)
Dept: INTERNAL MEDICINE | Facility: CLINIC | Age: 86
End: 2021-01-07

## 2021-01-07 LAB — BACTERIA SPEC AEROBE CULT: ABNORMAL

## 2021-01-07 NOTE — TELEPHONE ENCOUNTER
S/W Shara Caldera in micro at [facility].  States she was just needing to make sure that we were aware of pt's urine culture results for >100,000 CFU/mL Enterobacter cloacae complex CRE.  Expl that we have received results and pt is being treated with abx.  Verb understanding and apprec.

## 2021-02-14 DIAGNOSIS — I48.0 PAROXYSMAL ATRIAL FIBRILLATION (HCC): ICD-10-CM

## 2021-02-15 RX ORDER — OMEPRAZOLE 20 MG/1
CAPSULE, DELAYED RELEASE ORAL
Qty: 90 CAPSULE | Refills: 0 | Status: SHIPPED | OUTPATIENT
Start: 2021-02-15 | End: 2021-06-07

## 2021-02-15 RX ORDER — SOTALOL HYDROCHLORIDE 120 MG/1
TABLET ORAL
Qty: 180 TABLET | Refills: 3 | Status: SHIPPED | OUTPATIENT
Start: 2021-02-15 | End: 2021-04-14

## 2021-02-15 RX ORDER — TAMSULOSIN HYDROCHLORIDE 0.4 MG/1
CAPSULE ORAL
Qty: 90 CAPSULE | Refills: 0 | Status: SHIPPED | OUTPATIENT
Start: 2021-02-15 | End: 2021-05-31

## 2021-02-15 NOTE — TELEPHONE ENCOUNTER
Medication Refill Request    Medication:  - sotalol (BETAPACE) 120 MG tablet BID    Pertinent Labs:  Lab Results   Component Value Date    GLUCOSE 139 (H) 12/27/2020    BUN 18 12/27/2020    CREATININE 0.67 (L) 12/27/2020    EGFRIFNONA 113 12/27/2020    BCR 26.9 (H) 12/27/2020    K 3.7 12/27/2020    CO2 26.0 12/27/2020    CALCIUM 9.0 12/27/2020    ALBUMIN 3.80 12/27/2020    ALKPHOS 86 12/27/2020    AST 24 12/27/2020    ALT 32 12/27/2020      Lab Results   Component Value Date    CHOL 114 12/15/2020    TRIG 248 (H) 12/15/2020    HDL 31 (L) 12/15/2020    LDL 44 12/15/2020     Lab Results   Component Value Date    HGBA1C 6.95 (H) 12/15/2020     Lab Results   Component Value Date    WBC 8.64 12/27/2020    HGB 15.0 12/27/2020    HCT 44.7 12/27/2020    MCV 93.7 12/27/2020     12/27/2020     Lab Results   Component Value Date    TSH 2.130 12/27/2020

## 2021-02-19 NOTE — PROGRESS NOTES
"[facility]      Identification: Darien Camarena is a 85 y.o. male who lives in [place], [place]    Reason for visit:  · Paroxysmal atrial fibrillation  · Hypertension  · Hyperlipidemia    Subjective          Darien Camarena presents to [facility] for followup.    History of Present Illness  Patient is an 85-year-old male who returns today for follow-up of his paroxysmal atrial fibrillation and cardiac risk factors.  Since his last visit he did go out of rhythm in December and presented to [facility] emergency room.  EKG showed him to be in atrial fibrillation with RVR at 139 bpm.  His proBNP and troponin was negative.  Since he is chronically anticoagulated with Eliquis they did an immediate external cardioversion that successfully restored normal sinus rhythm and he was sent home.  He has stayed in rhythm since then and has had no issues.  He actually recently got his COVID-19 vaccines and is done well with those.  He denies any chest pain, dyspnea, orthopnea, palpitations or syncope.  He is tolerating anticoagulation without any reports of active or overt bleeding.  He is on sotalol for his A. fib and EKG today shows sinus bradycardia with sinus arrhythmia and acceptable QT interval.  He is on statin therapy and tolerating without myalgias.  Last lipid panel in September showed LDL at goal.    Objective     /66   Pulse 67   Ht 190.5 cm (75\")   Wt 119 kg (262 lb)   SpO2 96%   BMI 32.75 kg/m²       Constitutional:       Appearance: Healthy appearance. Well-developed.   Eyes:      General: Lids are normal. No scleral icterus.     Conjunctiva/sclera: Conjunctivae normal.   HENT:      Head: Normocephalic and atraumatic.   Neck:      Musculoskeletal: Normal range of motion.      Thyroid: No thyromegaly.      Vascular: No carotid bruit or JVD.   Pulmonary:      Effort: Pulmonary effort is normal.      Breath sounds: Normal breath sounds. No wheezing. No " rhonchi. No rales.   Cardiovascular:      Normal rate. Regular rhythm.      Murmurs: There is no murmur.      No gallop. No rub.   Pulses:     Intact distal pulses.   Edema:     Peripheral edema absent.   Abdominal:      General: There is no distension.      Palpations: Abdomen is soft. There is no abdominal mass.   Skin:     General: Skin is warm and dry.      Findings: No rash.   Neurological:      General: No focal deficit present.      Mental Status: Alert and oriented to person, place, and time.      Gait: Gait is intact.   Psychiatric:         Attention and Perception: Attention normal.         Mood and Affect: Mood normal.         Behavior: Behavior normal.         Result Review :         Lab Results   Component Value Date    CHOL 114 12/15/2020    HDL 31 (L) 12/15/2020    LDL 44 12/15/2020    VLDL 39 12/15/2020              ECG 12 Lead    Date/Time: 2/24/2021 11:37 AM  Performed by: Blanquita Ansari APRN  Authorized by: Blanquita Ansari APRN   Comparison: compared with previous ECG from 12/27/2020  Similar to previous ECG  Comparison to previous ECG: EKGs are similar except prior EKG was sinus rhythm and current EKG sinus bradycardia  Rhythm: sinus bradycardia and sinus arrhythmia  BPM: 56    Clinical impression: normal ECG  Comments: Sinus bradycardia with sinus arrhythmia  QT/QTc 432/469                     Problem List Items Addressed This Visit        Cardiac and Vasculature    Essential hypertension    Overview     • Target blood pressure <130/80 mmHg         Current Assessment & Plan     · Hypertension is controlled  · Continue amlodipine 10 mg daily  · Continue hydrochlorothiazide 12.5 mg daily  · Continue lisinopril 40 mg daily         Hyperlipidemia LDL goal <100    Overview     · Moderate intensity statin therapy reasonable due to diabetic status         Current Assessment & Plan     · Continue Pravachol 40 mg daily  · LDL 44         Paroxysmal atrial fibrillation (CMS/HCC) - Primary     Overview     · Diagnosed August 2012.   · FARHAD-guided ECV (08/17/2012).  · CHADS-VASc 5 (age > 75, CAD, HTN, DM)  · Successful external cardioversion for asymptomatic atrial fibrillation with RVR, 10/25/18  · Successful cardioversion for atrial fibrillation RVR, 3/6/19.  Sotalol increased  · Clinic visit 4/9/2019 maintaining NSR  · Minimally symptomatic atrial fibrillation with cardioversion and the ER, 10/31/2019  · Presentation to ED in afib RVR. ECV (7/21/2020): Successfully restored NSR  · Presentation to [facility] ED in A. fib.  ECV performed (12/27/2020): Successfully restored NSR         Current Assessment & Plan     · Patient maintaining NSR  · No signs or symptoms TIA or CVA  · Continue sotalol 120 mg twice daily  · Continue Eliquis 5 mg twice daily         Relevant Orders    ECG 12 Lead        Patient is doing well from a cardiovascular standpoint.  He is maintaining normal sinus rhythm since since last cardioversion in December.  We will continue his current medications.  He will follow-up in 6 months or sooner if needed.  · Continue current medications  · Follow-up 6 months or sooner if needed      Follow-up   Return in about 6 months (around 8/24/2021), or if symptoms worsen or fail to improve, for Follow-up with Dr. Oliveros next visit.      Patient was given instructions and counseling regarding his condition or for health maintenance advice. Please see specific information pulled into the AVS if appropriate.     Blanquita Ansari, APRN  2/24/2021

## 2021-02-24 ENCOUNTER — OFFICE VISIT (OUTPATIENT)
Dept: CARDIOLOGY | Facility: CLINIC | Age: 86
End: 2021-02-24

## 2021-02-24 VITALS
BODY MASS INDEX: 32.58 KG/M2 | HEART RATE: 67 BPM | DIASTOLIC BLOOD PRESSURE: 66 MMHG | HEIGHT: 75 IN | SYSTOLIC BLOOD PRESSURE: 108 MMHG | WEIGHT: 262 LBS | OXYGEN SATURATION: 96 %

## 2021-02-24 DIAGNOSIS — E78.5 HYPERLIPIDEMIA LDL GOAL <100: ICD-10-CM

## 2021-02-24 DIAGNOSIS — I10 ESSENTIAL HYPERTENSION: ICD-10-CM

## 2021-02-24 DIAGNOSIS — I48.0 PAROXYSMAL ATRIAL FIBRILLATION (HCC): Primary | ICD-10-CM

## 2021-02-24 PROCEDURE — 99214 OFFICE O/P EST MOD 30 MIN: CPT | Performed by: NURSE PRACTITIONER

## 2021-02-24 PROCEDURE — 93000 ELECTROCARDIOGRAM COMPLETE: CPT | Performed by: NURSE PRACTITIONER

## 2021-02-24 NOTE — ASSESSMENT & PLAN NOTE
· Patient maintaining NSR  · No signs or symptoms TIA or CVA  · Continue sotalol 120 mg twice daily  · Continue Eliquis 5 mg twice daily

## 2021-03-16 DIAGNOSIS — I10 ESSENTIAL HYPERTENSION: Chronic | ICD-10-CM

## 2021-03-16 RX ORDER — FINASTERIDE 5 MG/1
TABLET, FILM COATED ORAL
Qty: 90 TABLET | Refills: 1 | Status: SHIPPED | OUTPATIENT
Start: 2021-03-16 | End: 2021-09-13

## 2021-03-16 RX ORDER — AMLODIPINE BESYLATE 10 MG/1
TABLET ORAL
Qty: 90 TABLET | Refills: 1 | Status: SHIPPED | OUTPATIENT
Start: 2021-03-16 | End: 2021-09-13

## 2021-03-16 RX ORDER — ALLOPURINOL 300 MG/1
TABLET ORAL
Qty: 90 TABLET | Refills: 1 | Status: SHIPPED | OUTPATIENT
Start: 2021-03-16 | End: 2021-10-09

## 2021-03-16 RX ORDER — HYDROCHLOROTHIAZIDE 12.5 MG/1
CAPSULE, GELATIN COATED ORAL
Qty: 90 CAPSULE | Refills: 1 | Status: SHIPPED | OUTPATIENT
Start: 2021-03-16 | End: 2021-09-27

## 2021-03-17 ENCOUNTER — HOSPITAL ENCOUNTER (EMERGENCY)
Facility: HOSPITAL | Age: 86
Discharge: HOME | End: 2021-03-17
Attending: EMERGENCY MEDICINE | Admitting: EMERGENCY MEDICINE

## 2021-03-17 ENCOUNTER — APPOINTMENT (OUTPATIENT)
Dept: GENERAL RADIOLOGY | Facility: HOSPITAL | Age: 86
End: 2021-03-17

## 2021-03-17 VITALS
RESPIRATION RATE: 20 BRPM | BODY MASS INDEX: 31.83 KG/M2 | TEMPERATURE: 98.9 F | WEIGHT: 256 LBS | HEIGHT: 75 IN | HEART RATE: 58 BPM | DIASTOLIC BLOOD PRESSURE: 78 MMHG | OXYGEN SATURATION: 97 % | SYSTOLIC BLOOD PRESSURE: 122 MMHG

## 2021-03-17 DIAGNOSIS — I48.91 ATRIAL FIBRILLATION WITH RVR (HCC): Primary | ICD-10-CM

## 2021-03-17 DIAGNOSIS — Z01.89 ENCOUNTER FOR CARDIOVERSION PROCEDURE: ICD-10-CM

## 2021-03-17 LAB
ALBUMIN SERPL-MCNC: 3.9 G/DL (ref 3.5–5.2)
ALBUMIN/GLOB SERPL: 1.3 G/DL
ALP SERPL-CCNC: 91 U/L (ref 39–117)
ALT SERPL W P-5'-P-CCNC: 22 U/L (ref 1–41)
ANION GAP SERPL CALCULATED.3IONS-SCNC: 10 MMOL/L (ref 5–15)
AST SERPL-CCNC: 19 U/L (ref 1–40)
BASOPHILS # BLD AUTO: 0.07 10*3/MM3 (ref 0–0.2)
BASOPHILS NFR BLD AUTO: 0.8 % (ref 0–1.5)
BILIRUB SERPL-MCNC: 0.7 MG/DL (ref 0–1.2)
BUN SERPL-MCNC: 19 MG/DL (ref 8–23)
BUN/CREAT SERPL: 27.1 (ref 7–25)
CALCIUM SPEC-SCNC: 9.2 MG/DL (ref 8.6–10.5)
CHLORIDE SERPL-SCNC: 102 MMOL/L (ref 98–107)
CO2 SERPL-SCNC: 28 MMOL/L (ref 22–29)
CREAT SERPL-MCNC: 0.7 MG/DL (ref 0.76–1.27)
DEPRECATED RDW RBC AUTO: 45.1 FL (ref 37–54)
EOSINOPHIL # BLD AUTO: 0.4 10*3/MM3 (ref 0–0.4)
EOSINOPHIL NFR BLD AUTO: 4.4 % (ref 0.3–6.2)
ERYTHROCYTE [DISTWIDTH] IN BLOOD BY AUTOMATED COUNT: 13.3 % (ref 12.3–15.4)
GFR SERPL CREATININE-BSD FRML MDRD: 107 ML/MIN/1.73
GLOBULIN UR ELPH-MCNC: 3.1 GM/DL
GLUCOSE SERPL-MCNC: 165 MG/DL (ref 65–99)
HCT VFR BLD AUTO: 46.7 % (ref 37.5–51)
HGB BLD-MCNC: 15.5 G/DL (ref 13–17.7)
HOLD SPECIMEN: NORMAL
IMM GRANULOCYTES # BLD AUTO: 0.04 10*3/MM3 (ref 0–0.05)
IMM GRANULOCYTES NFR BLD AUTO: 0.4 % (ref 0–0.5)
LYMPHOCYTES # BLD AUTO: 1.69 10*3/MM3 (ref 0.7–3.1)
LYMPHOCYTES NFR BLD AUTO: 18.7 % (ref 19.6–45.3)
MAGNESIUM SERPL-MCNC: 1.6 MG/DL (ref 1.6–2.4)
MCH RBC QN AUTO: 30.9 PG (ref 26.6–33)
MCHC RBC AUTO-ENTMCNC: 33.2 G/DL (ref 31.5–35.7)
MCV RBC AUTO: 93.2 FL (ref 79–97)
MONOCYTES # BLD AUTO: 0.71 10*3/MM3 (ref 0.1–0.9)
MONOCYTES NFR BLD AUTO: 7.8 % (ref 5–12)
NEUTROPHILS NFR BLD AUTO: 6.14 10*3/MM3 (ref 1.7–7)
NEUTROPHILS NFR BLD AUTO: 67.9 % (ref 42.7–76)
NRBC BLD AUTO-RTO: 0 /100 WBC (ref 0–0.2)
NT-PROBNP SERPL-MCNC: 1531 PG/ML (ref 0–1800)
PLATELET # BLD AUTO: 206 10*3/MM3 (ref 140–450)
PMV BLD AUTO: 10.3 FL (ref 6–12)
POTASSIUM SERPL-SCNC: 4.1 MMOL/L (ref 3.5–5.2)
PROT SERPL-MCNC: 7 G/DL (ref 6–8.5)
QT INTERVAL: 292 MS
QT INTERVAL: 436 MS
QTC INTERVAL: 426 MS
QTC INTERVAL: 446 MS
RBC # BLD AUTO: 5.01 10*6/MM3 (ref 4.14–5.8)
SODIUM SERPL-SCNC: 140 MMOL/L (ref 136–145)
TROPONIN T SERPL-MCNC: <0.01 NG/ML (ref 0–0.03)
TSH SERPL DL<=0.05 MIU/L-ACNC: 2.56 UIU/ML (ref 0.27–4.2)
WBC # BLD AUTO: 9.05 10*3/MM3 (ref 3.4–10.8)
WHOLE BLOOD HOLD SPECIMEN: NORMAL
WHOLE BLOOD HOLD SPECIMEN: NORMAL

## 2021-03-17 PROCEDURE — 83880 ASSAY OF NATRIURETIC PEPTIDE: CPT | Performed by: EMERGENCY MEDICINE

## 2021-03-17 PROCEDURE — 83735 ASSAY OF MAGNESIUM: CPT | Performed by: EMERGENCY MEDICINE

## 2021-03-17 PROCEDURE — 99284 EMERGENCY DEPT VISIT MOD MDM: CPT

## 2021-03-17 PROCEDURE — 99152 MOD SED SAME PHYS/QHP 5/>YRS: CPT

## 2021-03-17 PROCEDURE — 92960 CARDIOVERSION ELECTRIC EXT: CPT

## 2021-03-17 PROCEDURE — 93005 ELECTROCARDIOGRAM TRACING: CPT | Performed by: EMERGENCY MEDICINE

## 2021-03-17 PROCEDURE — 84443 ASSAY THYROID STIM HORMONE: CPT | Performed by: EMERGENCY MEDICINE

## 2021-03-17 PROCEDURE — 80053 COMPREHEN METABOLIC PANEL: CPT | Performed by: EMERGENCY MEDICINE

## 2021-03-17 PROCEDURE — 84484 ASSAY OF TROPONIN QUANT: CPT | Performed by: EMERGENCY MEDICINE

## 2021-03-17 PROCEDURE — 71045 X-RAY EXAM CHEST 1 VIEW: CPT

## 2021-03-17 PROCEDURE — 85025 COMPLETE CBC W/AUTO DIFF WBC: CPT | Performed by: EMERGENCY MEDICINE

## 2021-03-17 RX ORDER — SODIUM CHLORIDE 0.9 % (FLUSH) 0.9 %
10 SYRINGE (ML) INJECTION AS NEEDED
Status: DISCONTINUED | OUTPATIENT
Start: 2021-03-17 | End: 2021-03-17 | Stop reason: HOSPADM

## 2021-03-17 RX ADMIN — METHOHEXITAL SODIUM 10 MG: 500 INJECTION, POWDER, LYOPHILIZED, FOR SOLUTION INTRAMUSCULAR; INTRAVENOUS; RECTAL at 09:19

## 2021-03-17 RX ADMIN — METHOHEXITAL SODIUM 60 MG: 500 INJECTION, POWDER, LYOPHILIZED, FOR SOLUTION INTRAMUSCULAR; INTRAVENOUS; RECTAL at 09:30

## 2021-03-17 RX ADMIN — METHOHEXITAL SODIUM 50 MG: 500 INJECTION, POWDER, LYOPHILIZED, FOR SOLUTION INTRAMUSCULAR; INTRAVENOUS; RECTAL at 09:19

## 2021-03-17 NOTE — ED PROVIDER NOTES
Subjective   85-year-old male presents to the emergency department with complaints of irregular heartbeat.  The patient states that he has a history of paroxysmal atrial fibrillation (on Eliquis).  He felt himself go out of rhythm about 7:00 yesterday evening.  He states that he has no associated symptoms.  Specifically, he has no chest pain, shortness of breath, nausea or lightheadedness.  The patient states that he has been cardioverted at least 3 times in the past and would like to be cardioverted again today.  He states that he is planning to travel to [facility] on Friday and would like to be in rhythm before he travels.  The patient states that he also has a history of hypertension and diabetes.  He has taken all of his meds, including his sotalol, this morning.  He is without other complaint.  He is a non-smoker.  No alcohol use.  His cardiologist is Dr. Oliveros.      History provided by:  Patient      Review of Systems   Constitutional: Negative for chills and fever.   HENT: Negative for nosebleeds and sore throat.    Respiratory: Negative for cough and shortness of breath.    Cardiovascular: Positive for palpitations. Negative for chest pain.   Gastrointestinal: Negative for abdominal pain, blood in stool, diarrhea, nausea and vomiting.   Genitourinary: Negative for dysuria.   Musculoskeletal: Negative for back pain.   Skin: Negative for pallor.   Neurological: Negative for dizziness, light-headedness and headaches.   Hematological: Bruises/bleeds easily (on Eliquis).   Psychiatric/Behavioral: Negative.        Past Medical History:   Diagnosis Date   • Atrial fibrillation (CMS/HCC)    • Chronic kidney disease    • Diabetes mellitus (CMS/HCC)    • GERD (gastroesophageal reflux disease)    • Gout    • History of colonoscopy 09/12/2012   • Hyperlipidemia    • Hypertension    • PAF (paroxysmal atrial fibrillation) (CMS/HCC)    • Prostatism    • Sleep apnea     CPAP HS       Allergies   Allergen  Reactions   • Penicillins Hives   • Xarelto [Rivaroxaban]      GI bleed       Past Surgical History:   Procedure Laterality Date   • CARDIOVERSION     • CATARACT EXTRACTION Left    • KIDNEY STONE SURGERY         Family History   Problem Relation Age of Onset   • Alzheimer's disease Mother    • Cancer Father    • COPD Father        Social History     Socioeconomic History   • Marital status:      Spouse name: Not on file   • Number of children: Not on file   • Years of education: Not on file   • Highest education level: Not on file   Tobacco Use   • Smoking status: Former Smoker     Quit date: 1960     Years since quittin.8   • Smokeless tobacco: Never Used   • Tobacco comment: started at 13 yo.   Substance and Sexual Activity   • Alcohol use: No   • Drug use: No   • Sexual activity: Defer           Objective   Physical Exam  Constitutional:       General: He is not in acute distress.     Appearance: He is not ill-appearing.   HENT:      Nose: Nose normal.      Mouth/Throat:      Mouth: Mucous membranes are moist.   Eyes:      Pupils: Pupils are equal, round, and reactive to light.   Cardiovascular:      Rate and Rhythm: Tachycardia present.      Comments: Irregularly irregular rhythm with a rate in the 120s  Pulmonary:      Effort: Pulmonary effort is normal.      Breath sounds: Normal breath sounds. No wheezing, rhonchi or rales.   Abdominal:      General: Bowel sounds are normal.      Tenderness: There is no abdominal tenderness. There is no guarding.   Musculoskeletal:         General: No tenderness. Normal range of motion.      Cervical back: Normal range of motion.      Right lower leg: No edema.      Left lower leg: No edema.   Skin:     General: Skin is warm and dry.   Neurological:      General: No focal deficit present.      Mental Status: He is alert and oriented to person, place, and time.   Psychiatric:         Mood and Affect: Mood normal.         Electrical Cardioversion    Date/Time:  3/17/2021 9:24 AM  Performed by: nAdrey Calixto MD  Authorized by: Andrey Calixto MD     Consent:     Consent obtained:  Written and verbal    Consent given by:  Patient    Risks discussed:  Cutaneous burn, death, induced arrhythmia and pain    Alternatives discussed:  Rate-control medication, alternative treatment, delayed treatment and observation  Universal protocol:     Procedure explained and questions answered to patient or proxy's satisfaction: yes      Test results available and properly labeled: yes      Immediately prior to procedure a time out was called: yes      Patient identity confirmed:  Verbally with patient, arm band and provided demographic data  Pre-procedure details:     Cardioversion basis:  Emergent    Rhythm:  Atrial fibrillation    Electrode placement:  Anterior-posterior  Attempt one:     Cardioversion mode:  Synchronous    Waveform:  Biphasic    Shock (Joules):  120    Shock outcome:  Conversion to normal sinus rhythm  Post-procedure details:     Patient status:  Awake    Patient tolerance of procedure:  Tolerated well, no immediate complications  Procedural Sedation    Date/Time: 3/17/2021 9:25 AM  Performed by: Andrey Calixto MD  Authorized by: Andrey Calixto MD     Consent:     Consent obtained:  Written and verbal    Consent given by:  Patient    Risks discussed:  Allergic reaction, dysrhythmia, inadequate sedation, vomiting, nausea, prolonged hypoxia resulting in organ damage and respiratory compromise necessitating ventilatory assistance and intubation  Universal protocol:     Procedure explained and questions answered to patient or proxy's satisfaction: yes      Test results available and properly labeled: yes      Immediately prior to procedure a time out was called: yes      Patient identity confirmation method:  Verbally with patient, arm band and provided demographic data  Indications:     Procedure performed:  Cardioversion    Procedure necessitating  sedation performed by:  Physician performing sedation    Intended level of sedation:  Deep  Pre-sedation assessment:     Time since last food or drink:  3hrs    ASA classification: class 2 - patient with mild systemic disease      Neck mobility: normal      Mouth opening:  3 or more finger widths    Mallampati score:  II - soft palate, uvula, fauces visible    Pre-sedation assessments completed and reviewed: airway patency, anesthesia/sedation history, cardiovascular function, mental status, nausea/vomiting, pain level, respiratory function and temperature      History of difficult intubation: no    Immediate pre-procedure details:     Reassessment: Patient reassessed immediately prior to procedure      Reviewed: vital signs and NPO status      Verified: bag valve mask available, emergency equipment available, intubation equipment available, IV patency confirmed, oxygen available and suction available    Procedure details (see MAR for exact dosages):     Sedation start time:  3/17/2021 9:16 AM    Preoxygenation:  Nasal cannula    Sedation:  Methohexital    Intra-procedure events: none      Sedation end time:  3/17/2021 9:27 AM    Total sedation time (minutes):  11  Post-procedure details:     Post-sedation assessment completed:  3/17/2021 9:27 AM    Attendance: Constant attendance by certified staff until patient recovered      Recovery: Patient returned to pre-procedure baseline      Post-sedation assessments completed and reviewed: airway patency, cardiovascular function, mental status, nausea/vomiting, pain level and respiratory function      Patient tolerance:  Tolerated well, no immediate complications               ED Course  ED Course as of Mar 17 1021   Wed Mar 17, 2021   0847 Patient is requesting sedation with cardioversion for his atrial fibrillation.  Patient has had similar in the past.  I discussed with him the risks and benefits of sedation with Brevital as well as the risks and benefits of  cardioversion.  I answered his questions and he voices understanding and wants to proceed with the procedure.    [RS]      ED Course User Index  [RS] Andrey Calixto MD      10:21 EDT   Pt is resting comfortably.  [id number] is a 2.  He was successfully cardioverted by Dr. Calixto.  No concerning labs.  Will d/c home to continue his current meds and f/u with Dr. Oliveros.    DISCHARGE    Patient discharged in stable condition.    Reviewed implications of results, diagnosis, meds, responsibility to follow up, warning signs and symptoms of possible worsening, potential complications and reasons to return to ER.    Patient/Family voiced understanding of above instructions.    Discussed plan for discharge, as there is no emergent indication for admission.  Pt/family is agreeable and understands need for follow up and possible repeat testing.  Pt/family is aware that discharge does not mean that nothing is wrong but that it indicates no emergency is currently present that requires admission and they must continue care with follow-up as given below or with a physician of their choice.     FOLLOW-UP  Titus Oliveros IV, MD  [address] [facility]  [address]  [facility] [address]  [phone]      call for follow up    [facility] Emergency Department  [address] [id number] [phone]    If symptoms worsen    [facility] CARDIOLOGY  [address] [facility]  [address]  [facility] [id number] [phone]             Medication List      No changes were made to your prescriptions during this visit.       Discharged                                     MDM    Final diagnoses:   Atrial fibrillation with RVR (CMS/[facility])   Encounter for cardioversion procedure              Delfino Forbes PA  03/17/21 1021

## 2021-03-17 NOTE — DISCHARGE INSTRUCTIONS
Rest.  Continue your current meds.  Follow up with Dr. Oliveros and/or [facility].  Return to the ER if acutely worse.

## 2021-03-24 ENCOUNTER — OFFICE VISIT (OUTPATIENT)
Dept: CARDIOLOGY | Facility: HOSPITAL | Age: 86
End: 2021-03-24

## 2021-03-24 VITALS
WEIGHT: 264.13 LBS | TEMPERATURE: 97.8 F | BODY MASS INDEX: 32.84 KG/M2 | HEIGHT: 75 IN | HEART RATE: 60 BPM | DIASTOLIC BLOOD PRESSURE: 67 MMHG | OXYGEN SATURATION: 95 % | RESPIRATION RATE: 18 BRPM | SYSTOLIC BLOOD PRESSURE: 135 MMHG

## 2021-03-24 DIAGNOSIS — I10 ESSENTIAL HYPERTENSION: Primary | ICD-10-CM

## 2021-03-24 DIAGNOSIS — E78.5 HYPERLIPIDEMIA LDL GOAL <100: ICD-10-CM

## 2021-03-24 DIAGNOSIS — I48.19 PERSISTENT ATRIAL FIBRILLATION (HCC): ICD-10-CM

## 2021-03-24 PROCEDURE — 99214 OFFICE O/P EST MOD 30 MIN: CPT | Performed by: NURSE PRACTITIONER

## 2021-03-25 NOTE — PROGRESS NOTES
"Chief Complaint  Establish Care and Atrial Fibrillation    Subjective    History of Present Illness {CC  Problem List  Visit  Diagnosis   Encounters  Notes  Medications  Labs  Result Review Imaging  Media :23}       History of Present Illness   85 year old male presents to the office today for ongoing evaluation of his paroxysmal atrial fibrillation.Patient presented to [facility] ED on 3/17/2021 with JONNY beckford.  He underwent cardioversion reports he has been maintaining normal sinus rhythm since last week.  Reports he is feeling great and currently denies chest pain, palpitations, dyspnea, dizziness, presyncope, syncope, orthopnea, PND or pedal edema.  He is currently anticoagulated with Eliquis and denies any signs and symptoms of bleeding.  Notes heart rate has been 50s to 60s at home blood pressures been 120s to 130s systolic.  Objective     Vital Signs:   Vitals:    03/24/21 1454 03/24/21 1455 03/24/21 1456   BP: 142/68 148/71 135/67   BP Location: Right arm Left arm Left arm   Patient Position: Sitting Standing Sitting   Cuff Size: Adult Adult Adult   Pulse: 61 72 60   Resp:   18   Temp:   97.8 °F (36.6 °C)   TempSrc:   Temporal   SpO2: 94% 95% 95%   Weight:   120 kg (264 lb 2 oz)   Height:   190.5 cm (75\")     Body mass index is 33.01 kg/m².  Physical Exam  Vitals and nursing note reviewed.   Constitutional:       Appearance: Normal appearance.   HENT:      Head: Normocephalic.   Eyes:      Pupils: Pupils are equal, round, and reactive to light.   Cardiovascular:      Rate and Rhythm: Normal rate and regular rhythm.      Pulses: Normal pulses.      Heart sounds: Normal heart sounds. No murmur heard.     Pulmonary:      Effort: Pulmonary effort is normal.      Breath sounds: Normal breath sounds.   Abdominal:      General: Bowel sounds are normal.      Palpations: Abdomen is soft.   Musculoskeletal:         General: Normal range of motion.      Cervical back: Normal range of motion.      " Right lower leg: No edema.      Left lower leg: No edema.   Skin:     General: Skin is warm and dry.      Capillary Refill: Capillary refill takes less than 2 seconds.   Neurological:      Mental Status: He is alert and oriented to person, place, and time.   Psychiatric:         Mood and Affect: Mood normal.         Thought Content: Thought content normal.              Result Review  Data Reviewed:{ Labs  Result Review  Imaging  Med Tab  Media :23}   BNP (03/17/2021 08:36)  TSH (03/17/2021 08:36)  Troponin (03/17/2021 08:36)  Magnesium (03/17/2021 08:36)  Comprehensive Metabolic Panel (03/17/2021 08:36)  CBC & Differential (03/17/2021 08:36)  ECG 12 Lead (03/17/2021 09:24)  ECG 12 Lead (03/17/2021 08:26)             Assessment and Plan {CC Problem List  Visit Diagnosis  ROS  Review (Popup)  Health Maintenance  Quality  BestPractice  Medications  SmartSets  SnapShot Encounters  Media :23}   1. Persistent atrial fibrillation (CMS/HCC)  S/p ECV 3/17 in ED  Continue sotalol  CHADS-VASc Risk Assessment            4 Total Score    1 Hypertension    2 Age >/= 75    1 DM        Criteria that do not apply:    CHF    PRIOR STROKE/TIA/THROMBO    Vascular Disease    Age 65-74    Sex: Female        Anticoagulated with eliquis and denies s/s of bleeding     2. Essential hypertension  Well controlled  HTN Education provided today including signs and symptoms, medication management, daily blood pressure monitoring. Patient encouraged to call the Heart and Valve center with any abnormal readings.     3. Hyperlipidemia LDL goal <100  Statin therapy           Follow Up {Instructions Charge Capture  Follow-up Communications :23}   Return if symptoms worsen or fail to improve.    Patient was given instructions and counseling regarding his condition or for health maintenance advice. Please see specific information pulled into the AVS if appropriate.  Patient was instructed to call the [facility] Center with any  questions, concerns, or worsening symptoms.    *Please note that portions of this note were completed with a voice recognition program. Efforts were made to edit the dictations, but occasionally words are mistranscribed.

## 2021-03-26 ENCOUNTER — APPOINTMENT (OUTPATIENT)
Dept: GENERAL RADIOLOGY | Facility: HOSPITAL | Age: 86
End: 2021-03-26

## 2021-03-26 ENCOUNTER — HOSPITAL ENCOUNTER (EMERGENCY)
Facility: HOSPITAL | Age: 86
Discharge: HOME | End: 2021-03-26
Attending: EMERGENCY MEDICINE | Admitting: EMERGENCY MEDICINE

## 2021-03-26 VITALS
SYSTOLIC BLOOD PRESSURE: 134 MMHG | BODY MASS INDEX: 31.83 KG/M2 | HEIGHT: 75 IN | OXYGEN SATURATION: 87 % | HEART RATE: 58 BPM | RESPIRATION RATE: 18 BRPM | WEIGHT: 256 LBS | DIASTOLIC BLOOD PRESSURE: 78 MMHG | TEMPERATURE: 97.2 F

## 2021-03-26 DIAGNOSIS — I48.91 ATRIAL FIBRILLATION WITH RVR (HCC): Primary | ICD-10-CM

## 2021-03-26 LAB
ALBUMIN SERPL-MCNC: 3.6 G/DL (ref 3.5–5.2)
ALBUMIN/GLOB SERPL: 1.2 G/DL
ALP SERPL-CCNC: 82 U/L (ref 39–117)
ALT SERPL W P-5'-P-CCNC: 20 U/L (ref 1–41)
ANION GAP SERPL CALCULATED.3IONS-SCNC: 13 MMOL/L (ref 5–15)
AST SERPL-CCNC: 21 U/L (ref 1–40)
BASOPHILS # BLD AUTO: 0.09 10*3/MM3 (ref 0–0.2)
BASOPHILS NFR BLD AUTO: 1 % (ref 0–1.5)
BILIRUB SERPL-MCNC: 0.5 MG/DL (ref 0–1.2)
BUN SERPL-MCNC: 25 MG/DL (ref 8–23)
BUN/CREAT SERPL: 24.8 (ref 7–25)
CALCIUM SPEC-SCNC: 9 MG/DL (ref 8.6–10.5)
CHLORIDE SERPL-SCNC: 103 MMOL/L (ref 98–107)
CO2 SERPL-SCNC: 23 MMOL/L (ref 22–29)
CREAT SERPL-MCNC: 1.01 MG/DL (ref 0.76–1.27)
DEPRECATED RDW RBC AUTO: 46.7 FL (ref 37–54)
EOSINOPHIL # BLD AUTO: 0.38 10*3/MM3 (ref 0–0.4)
EOSINOPHIL NFR BLD AUTO: 4.4 % (ref 0.3–6.2)
ERYTHROCYTE [DISTWIDTH] IN BLOOD BY AUTOMATED COUNT: 13.2 % (ref 12.3–15.4)
GFR SERPL CREATININE-BSD FRML MDRD: 70 ML/MIN/1.73
GLOBULIN UR ELPH-MCNC: 3.1 GM/DL
GLUCOSE SERPL-MCNC: 143 MG/DL (ref 65–99)
HCT VFR BLD AUTO: 45.7 % (ref 37.5–51)
HGB BLD-MCNC: 14.8 G/DL (ref 13–17.7)
HOLD SPECIMEN: NORMAL
IMM GRANULOCYTES # BLD AUTO: 0.02 10*3/MM3 (ref 0–0.05)
IMM GRANULOCYTES NFR BLD AUTO: 0.2 % (ref 0–0.5)
LYMPHOCYTES # BLD AUTO: 2.54 10*3/MM3 (ref 0.7–3.1)
LYMPHOCYTES NFR BLD AUTO: 29.5 % (ref 19.6–45.3)
MAGNESIUM SERPL-MCNC: 1.6 MG/DL (ref 1.6–2.4)
MCH RBC QN AUTO: 31 PG (ref 26.6–33)
MCHC RBC AUTO-ENTMCNC: 32.4 G/DL (ref 31.5–35.7)
MCV RBC AUTO: 95.8 FL (ref 79–97)
MONOCYTES # BLD AUTO: 0.74 10*3/MM3 (ref 0.1–0.9)
MONOCYTES NFR BLD AUTO: 8.6 % (ref 5–12)
NEUTROPHILS NFR BLD AUTO: 4.85 10*3/MM3 (ref 1.7–7)
NEUTROPHILS NFR BLD AUTO: 56.3 % (ref 42.7–76)
NRBC BLD AUTO-RTO: 0 /100 WBC (ref 0–0.2)
NT-PROBNP SERPL-MCNC: 1289 PG/ML (ref 0–1800)
PLATELET # BLD AUTO: 213 10*3/MM3 (ref 140–450)
PMV BLD AUTO: 10.2 FL (ref 6–12)
POTASSIUM SERPL-SCNC: 4.2 MMOL/L (ref 3.5–5.2)
PROT SERPL-MCNC: 6.7 G/DL (ref 6–8.5)
RBC # BLD AUTO: 4.77 10*6/MM3 (ref 4.14–5.8)
SODIUM SERPL-SCNC: 139 MMOL/L (ref 136–145)
TROPONIN T SERPL-MCNC: <0.01 NG/ML (ref 0–0.03)
TSH SERPL DL<=0.05 MIU/L-ACNC: 2.14 UIU/ML (ref 0.27–4.2)
WBC # BLD AUTO: 8.62 10*3/MM3 (ref 3.4–10.8)
WHOLE BLOOD HOLD SPECIMEN: NORMAL
WHOLE BLOOD HOLD SPECIMEN: NORMAL

## 2021-03-26 PROCEDURE — 92960 CARDIOVERSION ELECTRIC EXT: CPT

## 2021-03-26 PROCEDURE — 85025 COMPLETE CBC W/AUTO DIFF WBC: CPT

## 2021-03-26 PROCEDURE — 80053 COMPREHEN METABOLIC PANEL: CPT | Performed by: EMERGENCY MEDICINE

## 2021-03-26 PROCEDURE — 71045 X-RAY EXAM CHEST 1 VIEW: CPT

## 2021-03-26 PROCEDURE — 93005 ELECTROCARDIOGRAM TRACING: CPT

## 2021-03-26 PROCEDURE — 99284 EMERGENCY DEPT VISIT MOD MDM: CPT

## 2021-03-26 PROCEDURE — 99152 MOD SED SAME PHYS/QHP 5/>YRS: CPT

## 2021-03-26 PROCEDURE — 83880 ASSAY OF NATRIURETIC PEPTIDE: CPT | Performed by: EMERGENCY MEDICINE

## 2021-03-26 PROCEDURE — 93005 ELECTROCARDIOGRAM TRACING: CPT | Performed by: EMERGENCY MEDICINE

## 2021-03-26 PROCEDURE — 83735 ASSAY OF MAGNESIUM: CPT | Performed by: EMERGENCY MEDICINE

## 2021-03-26 PROCEDURE — 84484 ASSAY OF TROPONIN QUANT: CPT | Performed by: EMERGENCY MEDICINE

## 2021-03-26 PROCEDURE — 25010000002 PROPOFOL 10 MG/ML EMULSION: Performed by: EMERGENCY MEDICINE

## 2021-03-26 PROCEDURE — 84443 ASSAY THYROID STIM HORMONE: CPT | Performed by: EMERGENCY MEDICINE

## 2021-03-26 RX ORDER — PROPOFOL 10 MG/ML
100 VIAL (ML) INTRAVENOUS ONCE
Status: COMPLETED | OUTPATIENT
Start: 2021-03-26 | End: 2021-03-26

## 2021-03-26 RX ORDER — SODIUM CHLORIDE 0.9 % (FLUSH) 0.9 %
10 SYRINGE (ML) INJECTION AS NEEDED
Status: DISCONTINUED | OUTPATIENT
Start: 2021-03-26 | End: 2021-03-27 | Stop reason: HOSPADM

## 2021-03-26 RX ADMIN — PROPOFOL 60 MG: 10 INJECTION, EMULSION INTRAVENOUS at 22:44

## 2021-03-27 NOTE — ED PROVIDER NOTES
Subjective   Pt presents with heart palpitations that began this evening.  Nothing makes them better or worse.  He has history of PAF and this feels the same.  He is very bothered by the palpitations and desires cardioversion.  He has undergone that procedure several times.    He is on Eliquis and reports compliance for 21+ days with no missed doses.    He denies chest pain, dizziness, dyspnea.  No fever, cough or vomiting.      History provided by:  Patient      Review of Systems   Constitutional: Negative for fever.   Respiratory: Negative for cough and shortness of breath.    Cardiovascular: Positive for palpitations. Negative for chest pain.   Gastrointestinal: Negative for vomiting.   All other systems reviewed and are negative.      Past Medical History:   Diagnosis Date   • Atrial fibrillation (CMS/HCC)    • Chronic kidney disease    • Diabetes mellitus (CMS/HCC)    • GERD (gastroesophageal reflux disease)    • Gout    • History of colonoscopy 2012   • Hyperlipidemia    • Hypertension    • PAF (paroxysmal atrial fibrillation) (CMS/HCC)    • Prostatism    • Sleep apnea     CPAP HS       Allergies   Allergen Reactions   • Penicillins Hives   • Xarelto [Rivaroxaban]      GI bleed       Past Surgical History:   Procedure Laterality Date   • CARDIOVERSION     • CATARACT EXTRACTION Left    • KIDNEY STONE SURGERY         Family History   Problem Relation Age of Onset   • Alzheimer's disease Mother    • Cancer Father    • COPD Father    • No Known Problems Daughter    • No Known Problems Daughter    • No Known Problems Daughter        Social History     Socioeconomic History   • Marital status:      Spouse name: Not on file   • Number of children: Not on file   • Years of education: Not on file   • Highest education level: Not on file   Tobacco Use   • Smoking status: Former Smoker     Quit date: 1960     Years since quittin.9   • Smokeless tobacco: Never Used   • Tobacco comment: started at 14  yo.   Substance and Sexual Activity   • Alcohol use: No   • Drug use: Never   • Sexual activity: Defer           Objective   Physical Exam  Vitals and nursing note reviewed.   Constitutional:       General: He is not in acute distress.     Appearance: Normal appearance. He is not ill-appearing.   HENT:      Head: Normocephalic and atraumatic.      Mouth/Throat:      Mouth: Mucous membranes are moist.   Eyes:      General: No scleral icterus.        Right eye: No discharge.         Left eye: No discharge.      Conjunctiva/sclera: Conjunctivae normal.   Cardiovascular:      Rate and Rhythm: Tachycardia present. Rhythm irregular.      Heart sounds: No murmur heard.     Pulmonary:      Effort: Pulmonary effort is normal. No respiratory distress.      Breath sounds: Normal breath sounds. No wheezing.   Abdominal:      General: Bowel sounds are normal. There is no distension.      Palpations: Abdomen is soft.      Tenderness: There is no abdominal tenderness. There is no guarding or rebound.   Musculoskeletal:         General: No swelling. Normal range of motion.      Cervical back: Normal range of motion and neck supple.   Skin:     General: Skin is warm and dry.      Findings: No rash.   Neurological:      General: No focal deficit present.      Mental Status: He is alert and oriented to person, place, and time. Mental status is at baseline.   Psychiatric:         Mood and Affect: Mood normal.         Behavior: Behavior normal.         Thought Content: Thought content normal.         Electrical Cardioversion    Date/Time: 3/26/2021 11:35 PM  Performed by: Oneal Beckwith MD  Authorized by: Oneal Beckwith MD     Consent:     Consent obtained:  Verbal    Consent given by:  Patient    Risks discussed:  Induced arrhythmia and pain    Alternatives discussed:  Rate-control medication and alternative treatment  Pre-procedure details:     Cardioversion basis:  Emergent    Rhythm:  Atrial fibrillation  Attempt one:      Cardioversion mode:  Synchronous    Waveform:  Biphasic    Shock (Joules):  120    Shock outcome:  Conversion to normal sinus rhythm  Post-procedure details:     Patient status:  Awake    Patient tolerance of procedure:  Tolerated well, no immediate complications  Procedural Sedation    Date/Time: 3/26/2021 11:36 PM  Performed by: Oneal Beckwith MD  Authorized by: Oneal Beckwith MD     Consent:     Consent obtained:  Verbal    Consent given by:  Patient    Risks discussed:  Prolonged hypoxia resulting in organ damage and respiratory compromise necessitating ventilatory assistance and intubation  Indications:     Procedure performed:  Cardioversion    Procedure necessitating sedation performed by:  Physician performing sedation    Intended level of sedation:  Moderate (conscious sedation)  Pre-sedation assessment:     ASA classification: class 2 - patient with mild systemic disease      Mouth opening:  3 or more finger widths    Mallampati score:  II - soft palate, uvula, fauces visible    Pre-sedation assessments completed and reviewed: airway patency, anesthesia/sedation history, cardiovascular function, hydration status, mental status, nausea/vomiting and respiratory function    Immediate pre-procedure details:     Reassessment: Patient reassessed immediately prior to procedure      Reviewed: vital signs and relevant labs/tests      Verified: bag valve mask available, emergency equipment available, IV patency confirmed, oxygen available and suction available    Procedure details (see MAR for exact dosages):     Preoxygenation:  Nonrebreather mask    Sedation:  Propofol    Intra-procedure monitoring:  Blood pressure monitoring, frequent LOC assessments, frequent vital sign checks, continuous pulse oximetry and cardiac monitor    Intra-procedure events: none      Total sedation time (minutes):  15  Post-procedure details:     Attendance: Constant attendance by certified staff until patient  recovered      Recovery: Patient returned to pre-procedure baseline      Post-sedation assessments completed and reviewed: airway patency, cardiovascular function, mental status and respiratory function      Patient is stable for discharge or admission: yes      Patient tolerance:  Tolerated well, no immediate complications               ED Course         EKG AF RVR. Chads-vasc = 4.  He is anticoagulated.  Labs benign.    ECV as above.  NSR afterward, pt reports resolution of symptoms.  His wife is driving him home.  Will refer to [facility] clinic.                                  MDM  Number of Diagnoses or Management Options     Amount and/or Complexity of Data Reviewed  Clinical lab tests: ordered and reviewed  Tests in the radiology section of CPT®: ordered and reviewed  Discuss the patient with other providers: yes  Independent visualization of images, tracings, or specimens: yes        Final diagnoses:   Atrial fibrillation with RVR (CMS/HCC)       ED Disposition  ED Disposition     ED Disposition Condition Comment    Discharge Stable           [facility] CARDIOLOGY  [address]  [address]  [facility] [id number] [phone]  Schedule an appointment as soon as possible for a visit            Medication List      No changes were made to your prescriptions during this visit.          Oneal Beckwith MD  03/26/21 9985

## 2021-04-01 ENCOUNTER — PATIENT OUTREACH (OUTPATIENT)
Dept: CASE MANAGEMENT | Facility: OTHER | Age: 86
End: 2021-04-01

## 2021-04-01 LAB
QT INTERVAL: 336 MS
QTC INTERVAL: 472 MS

## 2021-04-01 NOTE — OUTREACH NOTE
Patient Outreach Note    Called patient in follow up to ED visit 3-26-21 with heart palpitations.  Explained role of Ambulatory  and contact information.  Patient said he feels great; he does not feel any irregular heart beats; does not feel short of breath.  He voiced compliance with daily medications; independence with daily care needs.  Reviewed upcoming appts with Cardio., BERYL Alvarez, on 4-9-21; and with PCP, Dr. Way, on 4-15-21. Noted AWV is up to date.  Patient voiced agreement with plans to be at these appts.  He denied needs or concerns for Rn-ACM to address.      Tyra Mercer RN  Ambulatory     4/1/2021, 15:06 EDT

## 2021-04-09 ENCOUNTER — OFFICE VISIT (OUTPATIENT)
Dept: CARDIOLOGY | Facility: HOSPITAL | Age: 86
End: 2021-04-09

## 2021-04-09 ENCOUNTER — HOSPITAL ENCOUNTER (OUTPATIENT)
Dept: CARDIOLOGY | Facility: HOSPITAL | Age: 86
Discharge: HOME | End: 2021-04-09

## 2021-04-09 VITALS
TEMPERATURE: 97.3 F | WEIGHT: 260.25 LBS | RESPIRATION RATE: 15 BRPM | HEART RATE: 60 BPM | DIASTOLIC BLOOD PRESSURE: 67 MMHG | OXYGEN SATURATION: 95 % | HEIGHT: 75 IN | BODY MASS INDEX: 32.36 KG/M2 | SYSTOLIC BLOOD PRESSURE: 125 MMHG

## 2021-04-09 DIAGNOSIS — I10 ESSENTIAL HYPERTENSION: ICD-10-CM

## 2021-04-09 DIAGNOSIS — I48.19 PERSISTENT ATRIAL FIBRILLATION (HCC): ICD-10-CM

## 2021-04-09 DIAGNOSIS — E78.5 HYPERLIPIDEMIA LDL GOAL <100: ICD-10-CM

## 2021-04-09 DIAGNOSIS — I48.19 PERSISTENT ATRIAL FIBRILLATION (HCC): Primary | ICD-10-CM

## 2021-04-09 DIAGNOSIS — Z79.899 LONG TERM CURRENT USE OF ANTIARRHYTHMIC MEDICAL THERAPY: ICD-10-CM

## 2021-04-09 PROCEDURE — 99214 OFFICE O/P EST MOD 30 MIN: CPT | Performed by: NURSE PRACTITIONER

## 2021-04-09 PROCEDURE — 93005 ELECTROCARDIOGRAM TRACING: CPT | Performed by: NURSE PRACTITIONER

## 2021-04-09 PROCEDURE — 93010 ELECTROCARDIOGRAM REPORT: CPT | Performed by: INTERNAL MEDICINE

## 2021-04-12 LAB
QT INTERVAL: 440 MS
QTC INTERVAL: 431 MS

## 2021-04-14 ENCOUNTER — HOSPITAL ENCOUNTER (EMERGENCY)
Facility: HOSPITAL | Age: 86
Discharge: HOME | End: 2021-04-15
Attending: EMERGENCY MEDICINE | Admitting: EMERGENCY MEDICINE

## 2021-04-14 ENCOUNTER — TELEPHONE (OUTPATIENT)
Dept: CARDIOLOGY | Facility: CLINIC | Age: 86
End: 2021-04-14

## 2021-04-14 DIAGNOSIS — Z79.01 ANTICOAGULATED: ICD-10-CM

## 2021-04-14 DIAGNOSIS — I48.0 PAROXYSMAL ATRIAL FIBRILLATION (HCC): Primary | ICD-10-CM

## 2021-04-14 DIAGNOSIS — R04.0 ACUTE POSTERIOR EPISTAXIS: Primary | ICD-10-CM

## 2021-04-14 LAB
ALBUMIN SERPL-MCNC: 3.7 G/DL (ref 3.5–5.2)
ALBUMIN/GLOB SERPL: 1.2 G/DL
ALP SERPL-CCNC: 84 U/L (ref 39–117)
ALT SERPL W P-5'-P-CCNC: 18 U/L (ref 1–41)
ANION GAP SERPL CALCULATED.3IONS-SCNC: 10 MMOL/L (ref 5–15)
AST SERPL-CCNC: 17 U/L (ref 1–40)
BASOPHILS # BLD AUTO: 0.06 10*3/MM3 (ref 0–0.2)
BASOPHILS NFR BLD AUTO: 0.6 % (ref 0–1.5)
BILIRUB SERPL-MCNC: 0.7 MG/DL (ref 0–1.2)
BUN SERPL-MCNC: 27 MG/DL (ref 8–23)
BUN/CREAT SERPL: 34.6 (ref 7–25)
CALCIUM SPEC-SCNC: 9.1 MG/DL (ref 8.6–10.5)
CHLORIDE SERPL-SCNC: 104 MMOL/L (ref 98–107)
CO2 SERPL-SCNC: 25 MMOL/L (ref 22–29)
CREAT SERPL-MCNC: 0.78 MG/DL (ref 0.76–1.27)
DEPRECATED RDW RBC AUTO: 45.9 FL (ref 37–54)
EOSINOPHIL # BLD AUTO: 0.38 10*3/MM3 (ref 0–0.4)
EOSINOPHIL NFR BLD AUTO: 4 % (ref 0.3–6.2)
ERYTHROCYTE [DISTWIDTH] IN BLOOD BY AUTOMATED COUNT: 13.2 % (ref 12.3–15.4)
GFR SERPL CREATININE-BSD FRML MDRD: 95 ML/MIN/1.73
GLOBULIN UR ELPH-MCNC: 3.1 GM/DL
GLUCOSE SERPL-MCNC: 157 MG/DL (ref 65–99)
HCT VFR BLD AUTO: 41.7 % (ref 37.5–51)
HGB BLD-MCNC: 13.5 G/DL (ref 13–17.7)
IMM GRANULOCYTES # BLD AUTO: 0.05 10*3/MM3 (ref 0–0.05)
IMM GRANULOCYTES NFR BLD AUTO: 0.5 % (ref 0–0.5)
LYMPHOCYTES # BLD AUTO: 1.7 10*3/MM3 (ref 0.7–3.1)
LYMPHOCYTES NFR BLD AUTO: 17.8 % (ref 19.6–45.3)
MCH RBC QN AUTO: 30.8 PG (ref 26.6–33)
MCHC RBC AUTO-ENTMCNC: 32.4 G/DL (ref 31.5–35.7)
MCV RBC AUTO: 95 FL (ref 79–97)
MONOCYTES # BLD AUTO: 0.67 10*3/MM3 (ref 0.1–0.9)
MONOCYTES NFR BLD AUTO: 7 % (ref 5–12)
NEUTROPHILS NFR BLD AUTO: 6.67 10*3/MM3 (ref 1.7–7)
NEUTROPHILS NFR BLD AUTO: 70.1 % (ref 42.7–76)
NRBC BLD AUTO-RTO: 0 /100 WBC (ref 0–0.2)
PLATELET # BLD AUTO: 189 10*3/MM3 (ref 140–450)
PMV BLD AUTO: 10.3 FL (ref 6–12)
POTASSIUM SERPL-SCNC: 4.1 MMOL/L (ref 3.5–5.2)
PROT SERPL-MCNC: 6.8 G/DL (ref 6–8.5)
RBC # BLD AUTO: 4.39 10*6/MM3 (ref 4.14–5.8)
SODIUM SERPL-SCNC: 139 MMOL/L (ref 136–145)
WBC # BLD AUTO: 9.53 10*3/MM3 (ref 3.4–10.8)

## 2021-04-14 PROCEDURE — 80053 COMPREHEN METABOLIC PANEL: CPT | Performed by: EMERGENCY MEDICINE

## 2021-04-14 PROCEDURE — 85025 COMPLETE CBC W/AUTO DIFF WBC: CPT

## 2021-04-14 PROCEDURE — 99283 EMERGENCY DEPT VISIT LOW MDM: CPT

## 2021-04-14 PROCEDURE — 83036 HEMOGLOBIN GLYCOSYLATED A1C: CPT | Performed by: INTERNAL MEDICINE

## 2021-04-14 PROCEDURE — 80061 LIPID PANEL: CPT | Performed by: INTERNAL MEDICINE

## 2021-04-14 RX ORDER — SODIUM CHLORIDE 0.9 % (FLUSH) 0.9 %
10 SYRINGE (ML) INJECTION AS NEEDED
Status: DISCONTINUED | OUTPATIENT
Start: 2021-04-14 | End: 2021-04-15 | Stop reason: HOSPADM

## 2021-04-14 RX ORDER — AMIODARONE HYDROCHLORIDE 200 MG/1
200 TABLET ORAL DAILY
Qty: 90 TABLET | Refills: 1 | Status: SHIPPED | OUTPATIENT
Start: 2021-04-29 | End: 2021-09-27

## 2021-04-14 RX ORDER — AMIODARONE HYDROCHLORIDE 200 MG/1
400 TABLET ORAL 2 TIMES DAILY
Qty: 56 TABLET | Refills: 0 | Status: SHIPPED | OUTPATIENT
Start: 2021-04-14 | End: 2021-04-28

## 2021-04-14 NOTE — TELEPHONE ENCOUNTER
----- Message from BERYL Wilson sent at 4/14/2021  9:57 AM EDT -----  Good morning, Dr Oliveros, I saw Mr. Felix in the office recently after a recent ECV in ED. This is second ECV in  ED in March. Would you like to see him in office or start another antiarrhythmic ?He is currently on sotalol 120 mg twice daily.  He is anticoagulated with Eliquis and has not missed any doses.  Thanks and have a great day.

## 2021-04-14 NOTE — TELEPHONE ENCOUNTER
Sherrill has been in the [facility] emergency room on multiple occasions in the last month for asymptomatic atrial fibrillation with RVR.  He has been on sotalol 120 mg twice daily but is having breakthrough.    I am recommending we discontinue sotalol and start amiodarone.  I have instructed him to stop sotalol starting tonight and then start amiodarone 400 mg twice daily (starting 4/16/2021) for 14 days, followed by 200 mg daily.  I would like him to come in for an EKG in roughly 14 days to see if he is maintaining sinus.    Electronically signed by Titus Oliveros IV, MD, 04/14/21, 4:24 PM EDT.

## 2021-04-14 NOTE — PROGRESS NOTES
"Chief Complaint  Atrial Fibrillation and Follow-up    Subjective    History of Present Illness {CC  Problem List  Visit  Diagnosis   Encounters  Notes  Medications  Labs  Result Review Imaging  Media :23}       History of Present Illness   5-year-old male presents the office today for ongoing evaluation of his paroxysmal atrial fibrillation.  He presented to [facility] ED on 3/17/2020 when in A. fib and underwent a cardioversion.  He maintained sinus rhythm until 3/26/2021 when he presented back to the ED underwent another cardioversion.  He reports he is feeling great currently denies chest pain, dizziness, presyncope, syncope, orthopnea, PND or pedal edema.  Notes blood pressures well controlled 120s to 130s systolic.  He is anticoagulated with Eliquis and denies any signs and symptoms of bleeding.  Notes compliance with his sotalol.  Objective     Vital Signs:   Vitals:    04/09/21 1529   BP: 125/67   BP Location: Left arm   Patient Position: Sitting   Cuff Size: Adult   Pulse: 60   Resp: 15   Temp: 97.3 °F (36.3 °C)   TempSrc: Temporal   SpO2: 95%   Weight: 118 kg (260 lb 4 oz)   Height: 190.5 cm (75\")     Body mass index is 32.53 kg/m².  Physical Exam  Vitals and nursing note reviewed.   Constitutional:       Appearance: Normal appearance.   HENT:      Head: Normocephalic.   Eyes:      Pupils: Pupils are equal, round, and reactive to light.   Cardiovascular:      Rate and Rhythm: Normal rate and regular rhythm.      Pulses: Normal pulses.      Heart sounds: Normal heart sounds. No murmur heard.     Pulmonary:      Effort: Pulmonary effort is normal.      Breath sounds: Normal breath sounds.   Abdominal:      General: Bowel sounds are normal.      Palpations: Abdomen is soft.   Musculoskeletal:         General: Normal range of motion.      Cervical back: Normal range of motion.      Right lower leg: No edema.      Left lower leg: No edema.   Skin:     General: Skin is warm and dry.      " Capillary Refill: Capillary refill takes less than 2 seconds.   Neurological:      Mental Status: He is alert and oriented to person, place, and time.   Psychiatric:         Mood and Affect: Mood normal.         Thought Content: Thought content normal.              Result Review  Data Reviewed:{ Labs  Result Review  Imaging  Med Tab  Media :23}   BNP (03/26/2021 19:59)  TSH (03/26/2021 19:59)  Troponin (03/26/2021 19:59)  Magnesium (03/26/2021 19:59)  Comprehensive Metabolic Panel (03/26/2021 19:59)  CBC & Differential (03/26/2021 19:59)  ECG 12 Lead (03/17/2021 08:26)  ECG 12 Lead (03/17/2021 09:24)  ECG 12 Lead (03/26/2021 19:55)  ECG 12 Lead (04/09/2021 14:35)               Assessment and Plan {CC Problem List  Visit Diagnosis  ROS  Review (Popup)  Health Maintenance  Quality  BestPractice  Medications  SmartSets  SnapShot Encounters  Media :23}   1. Persistent atrial fibrillation (CMS/HCC)   Status post ECV in ED on 3/17/2021 and 3/26/2021   Continue sotalol and Eliquis as previously directed  We will discuss ongoing plan of care with Dr. Low  - ECG 12 Lead; Future  CHADS-VASc Risk Assessment            4 Total Score    1 Hypertension    2 Age >/= 75    1 DM        Criteria that do not apply:    CHF    PRIOR STROKE/TIA/THROMBO    Vascular Disease    Age 65-74    Sex: Female          2. Essential hypertension  Controlled on amlodipine, hydrochlorothiazide, lisinopril    3. Long term current use of antiarrhythmic medical therapy  Stable QT/QTC on sotalol    4. Hyperlipidemia LDL goal <100  Statin  therapy        Follow Up {Instructions Charge Capture  Follow-up Communications :23}   Return if symptoms worsen or fail to improve.    Patient was given instructions and counseling regarding his condition or for health maintenance advice. Please see specific information pulled into the AVS if appropriate.  Patient was instructed to call the [facility] with any questions, concerns, or  worsening symptoms.    *Please note that portions of this note were completed with a voice recognition program. Efforts were made to edit the dictations, but occasionally words are mistranscribed.

## 2021-04-15 ENCOUNTER — OFFICE VISIT (OUTPATIENT)
Dept: INTERNAL MEDICINE | Facility: CLINIC | Age: 86
End: 2021-04-15

## 2021-04-15 VITALS
DIASTOLIC BLOOD PRESSURE: 74 MMHG | RESPIRATION RATE: 20 BRPM | SYSTOLIC BLOOD PRESSURE: 142 MMHG | BODY MASS INDEX: 32.37 KG/M2 | WEIGHT: 259 LBS | HEART RATE: 96 BPM | TEMPERATURE: 97.3 F

## 2021-04-15 VITALS
RESPIRATION RATE: 18 BRPM | BODY MASS INDEX: 31.83 KG/M2 | DIASTOLIC BLOOD PRESSURE: 80 MMHG | OXYGEN SATURATION: 93 % | HEART RATE: 73 BPM | SYSTOLIC BLOOD PRESSURE: 141 MMHG | TEMPERATURE: 97.8 F | HEIGHT: 75 IN | WEIGHT: 256 LBS

## 2021-04-15 DIAGNOSIS — I10 ESSENTIAL HYPERTENSION: ICD-10-CM

## 2021-04-15 DIAGNOSIS — E11.9 TYPE 2 DIABETES MELLITUS WITHOUT COMPLICATION, WITHOUT LONG-TERM CURRENT USE OF INSULIN (HCC): Primary | ICD-10-CM

## 2021-04-15 DIAGNOSIS — E78.5 HYPERLIPIDEMIA, UNSPECIFIED HYPERLIPIDEMIA TYPE: ICD-10-CM

## 2021-04-15 DIAGNOSIS — K21.00 GASTROESOPHAGEAL REFLUX DISEASE WITH ESOPHAGITIS WITHOUT HEMORRHAGE: ICD-10-CM

## 2021-04-15 LAB
CHOLEST SERPL-MCNC: 120 MG/DL (ref 0–200)
HBA1C MFR BLD: 6.5 % (ref 4.8–5.6)
HDLC SERPL-MCNC: 32 MG/DL (ref 40–60)
HOLD SPECIMEN: NORMAL
LDLC SERPL CALC-MCNC: 64 MG/DL (ref 0–100)
LDLC/HDLC SERPL: 1.9 {RATIO}
TRIGL SERPL-MCNC: 136 MG/DL (ref 0–150)
VLDLC SERPL-MCNC: 24 MG/DL (ref 5–40)
WHOLE BLOOD HOLD SPECIMEN: NORMAL
WHOLE BLOOD HOLD SPECIMEN: NORMAL

## 2021-04-15 PROCEDURE — 36415 COLL VENOUS BLD VENIPUNCTURE: CPT | Performed by: INTERNAL MEDICINE

## 2021-04-15 PROCEDURE — 99214 OFFICE O/P EST MOD 30 MIN: CPT | Performed by: INTERNAL MEDICINE

## 2021-04-15 RX ORDER — METHENAMINE HIPPURATE 1000 MG/1
TABLET ORAL
Qty: 90 TABLET | Refills: 1 | Status: SHIPPED | OUTPATIENT
Start: 2021-04-15 | End: 2021-10-25

## 2021-04-15 NOTE — ED PROVIDER NOTES
EMERGENCY DEPARTMENT ENCOUNTER      Pt Name: Darien Camarena  MRN: [id number]  YOB: 1935  Date of evaluation: 4/14/2021  Provider: Rui Mosquera MD    CHIEF COMPLAINT       Chief Complaint   Patient presents with   • Nose Bleed         HISTORY OF PRESENT ILLNESS  (Location/Symptom, Timing/Onset, Context/Setting, Quality, Duration, Modifying Factors, Severity.)   Darien Camarena is a 85 y.o. male who presents to the emergency department with acute left-sided epistaxis that began about 3 hours prior to arrival in the setting of Eliquis use that is moderate in severity, without any associated trauma, and without any associated modifying factors including pressure.  Patient has occasional history of epistaxis, but states that this is more severe.  He does feel blood running down the back of his throat and has also been coughing up some blood since this started.      Nursing notes were reviewed.    REVIEW OF SYSTEMS    (2-9 systems for level 4, 10 or more for level 5)   ROS:  General:  No fevers, no chills, no weakness  Cardiovascular:  No chest pain, no palpitations  Respiratory:  No shortness of breath, no cough, no wheezing  Gastrointestinal:  No pain, no nausea, no vomiting, no diarrhea  Musculoskeletal:  No muscle pain, no joint pain  Skin:  No rash, no easy bruising  Neurologic:  No speech problems, no headache, no extremity numbness, no extremity tingling, no extremity weakness  Psychiatric:  No anxiety  Genitourinary:  No dysuria, no hematuria    Except as noted above the remainder of the review of systems was reviewed and negative.       PAST MEDICAL HISTORY     Past Medical History:   Diagnosis Date   • Atrial fibrillation (CMS/[facility])    • Chronic kidney disease    • Diabetes mellitus (CMS/HCC)    • GERD (gastroesophageal reflux disease)    • Gout    • History of colonoscopy 09/12/2012   • Hyperlipidemia    • Hypertension    • PAF (paroxysmal atrial fibrillation) (CMS/[facility])    •  Prostatism    • Sleep apnea     CPAP HS         SURGICAL HISTORY       Past Surgical History:   Procedure Laterality Date   • CARDIOVERSION     • CATARACT EXTRACTION Left    • KIDNEY STONE SURGERY           CURRENT MEDICATIONS     No current facility-administered medications for this encounter.    Current Outpatient Medications:   •  allopurinol (ZYLOPRIM) 300 MG tablet, Take 1 tablet by mouth once daily, Disp: 90 tablet, Rfl: 1  •  amiodarone (PACERONE) 200 MG tablet, Take 2 tablets by mouth 2 (Two) Times a Day for 14 days. Indications: Atrial Fibrillation, Disp: 56 tablet, Rfl: 0  •  [START ON 4/29/2021] amiodarone (PACERONE) 200 MG tablet, Take 1 tablet by mouth Daily. Indications: Atrial Fibrillation, Disp: 90 tablet, Rfl: 1  •  amLODIPine (NORVASC) 10 MG tablet, Take 1 tablet by mouth once daily, Disp: 90 tablet, Rfl: 1  •  apixaban (Eliquis) 5 MG tablet tablet, Take 1 tablet by mouth 2 (Two) Times a Day., Disp: 180 tablet, Rfl: 3  •  Ascorbic Acid (VITAMIN C) 500 MG capsule, Take 1 tablet by mouth 4 (four) times a day., Disp: , Rfl:   •  docusate sodium (COLACE) 100 MG capsule, Take 300 mg by mouth every night., Disp: , Rfl:   •  Ferrous Gluconate (IRON) 240 (27 FE) MG tablet, Take 1 tablet by mouth daily., Disp: , Rfl:   •  finasteride (PROSCAR) 5 MG tablet, TAKE 1 TABLET BY MOUTH AT NIGHT, Disp: 90 tablet, Rfl: 1  •  hydroCHLOROthiazide (MICROZIDE) 12.5 MG capsule, Take 1 capsule by mouth once daily in the morning, Disp: 90 capsule, Rfl: 1  •  lisinopril (PRINIVIL,ZESTRIL) 40 MG tablet, Take 1 tablet by mouth once daily, Disp: 90 tablet, Rfl: 2  •  metFORMIN (GLUCOPHAGE) 1000 MG tablet, Take 1 tablet by mouth twice daily, Disp: 180 tablet, Rfl: 1  •  methenamine (HIPREX) 1 g tablet, TAKE 1/2 (ONE-HALF) TABLET BY MOUTH TWICE DAILY WITH A MEAL, Disp: 90 tablet, Rfl: 1  •  omeprazole (priLOSEC) 20 MG capsule, Take 1 capsule by mouth once daily, Disp: 90 capsule, Rfl: 0  •  pravastatin (PRAVACHOL) 40 MG tablet,  Take 1 tablet by mouth once daily, Disp: 90 tablet, Rfl: 3  •  Probiotic Product (PROBIOTIC ADVANCED PO), Take 1 tablet by mouth 2 (Two) Times a Day., Disp: , Rfl:   •  tamsulosin (FLOMAX) 0.4 MG capsule 24 hr capsule, Take 1 capsule by mouth once daily, Disp: 90 capsule, Rfl: 0    ALLERGIES     Penicillins and Xarelto [rivaroxaban]    FAMILY HISTORY       Family History   Problem Relation Age of Onset   • Alzheimer's disease Mother    • Cancer Father    • COPD Father    • No Known Problems Daughter    • No Known Problems Daughter    • No Known Problems Daughter           SOCIAL HISTORY       Social History     Socioeconomic History   • Marital status:      Spouse name: Not on file   • Number of children: Not on file   • Years of education: Not on file   • Highest education level: Not on file   Tobacco Use   • Smoking status: Former Smoker     Quit date: 1960     Years since quittin.9   • Smokeless tobacco: Never Used   • Tobacco comment: started at 13 yo.   Substance and Sexual Activity   • Alcohol use: No   • Drug use: Never   • Sexual activity: Defer         PHYSICAL EXAM    (up to 7 for level 4, 8 or more for level 5)     Vitals:    04/15/21 0030 04/15/21 0100 04/15/21 0130 04/15/21 0200   BP: 151/80 141/76 146/84 141/80   Pulse:       Resp:       Temp:       TempSrc:       SpO2:       Weight:       Height:           Physical Exam  General: Awake, alert, moderate distress  HEENT: Conjunctiva normal.  Significant bleeding emanating from the left nare and there is also blood in the oropharynx.  Neck: Trachea midline.  Cardiac: Heart regular rate, rhythm, no murmurs, rubs, or gallops  Lungs: Lungs are clear to auscultation, there is no wheezing, rhonchi, or rales. There is no use of accessory muscles.  Chest wall: There is no tenderness to palpation over the chest wall or over ribs  Abdomen: Abdomen is soft, nontender, nondistended. There are no firm or pulsatile masses, no rebound rigidity or  guarding.   Musculoskeletal: No deformity.  Neuro: Alert and oriented x 4.  Dermatology: Skin is warm and dry  Psych: Mentation is grossly normal, cognition is grossly normal. Affect is appropriate.        DIAGNOSTIC RESULTS   LABS:    I have reviewed and interpreted all of the currently available lab results from this visit (if applicable):  Results for orders placed or performed during the hospital encounter of 04/14/21   Comprehensive Metabolic Panel    Specimen: Blood   Result Value Ref Range    Glucose 157 (H) 65 - 99 mg/dL    BUN 27 (H) 8 - 23 mg/dL    Creatinine 0.78 0.76 - 1.27 mg/dL    Sodium 139 136 - 145 mmol/L    Potassium 4.1 3.5 - 5.2 mmol/L    Chloride 104 98 - 107 mmol/L    CO2 25.0 22.0 - 29.0 mmol/L    Calcium 9.1 8.6 - 10.5 mg/dL    Total Protein 6.8 6.0 - 8.5 g/dL    Albumin 3.70 3.50 - 5.20 g/dL    ALT (SGPT) 18 1 - 41 U/L    AST (SGOT) 17 1 - 40 U/L    Alkaline Phosphatase 84 39 - 117 U/L    Total Bilirubin 0.7 0.0 - 1.2 mg/dL    eGFR Non African Amer 95 >60 mL/min/1.73    Globulin 3.1 gm/dL    A/G Ratio 1.2 g/dL    BUN/Creatinine Ratio 34.6 (H) 7.0 - 25.0    Anion Gap 10.0 5.0 - 15.0 mmol/L   CBC Auto Differential    Specimen: Blood   Result Value Ref Range    WBC 9.53 3.40 - 10.80 10*3/mm3    RBC 4.39 4.14 - 5.80 10*6/mm3    Hemoglobin 13.5 13.0 - 17.7 g/dL    Hematocrit 41.7 37.5 - 51.0 %    MCV 95.0 79.0 - 97.0 fL    MCH 30.8 26.6 - 33.0 pg    MCHC 32.4 31.5 - 35.7 g/dL    RDW 13.2 12.3 - 15.4 %    RDW-SD 45.9 37.0 - 54.0 fl    MPV 10.3 6.0 - 12.0 fL    Platelets 189 140 - 450 10*3/mm3    Neutrophil % 70.1 42.7 - 76.0 %    Lymphocyte % 17.8 (L) 19.6 - 45.3 %    Monocyte % 7.0 5.0 - 12.0 %    Eosinophil % 4.0 0.3 - 6.2 %    Basophil % 0.6 0.0 - 1.5 %    Immature Grans % 0.5 0.0 - 0.5 %    Neutrophils, Absolute 6.67 1.70 - 7.00 10*3/mm3    Lymphocytes, Absolute 1.70 0.70 - 3.10 10*3/mm3    Monocytes, Absolute 0.67 0.10 - 0.90 10*3/mm3    Eosinophils, Absolute 0.38 0.00 - 0.40 10*3/mm3     Basophils, Absolute 0.06 0.00 - 0.20 10*3/mm3    Immature Grans, Absolute 0.05 0.00 - 0.05 10*3/mm3    nRBC 0.0 0.0 - 0.2 /100 WBC   Light Blue Top   Result Value Ref Range    Extra Tube hold for add-on    Green Top (Gel)   Result Value Ref Range    Extra Tube Hold for add-ons.    Lavender Top   Result Value Ref Range    Extra Tube hold for add-on    Gold Top - SST   Result Value Ref Range    Extra Tube Hold for add-ons.    Gray Top   Result Value Ref Range    Extra Tube Hold for add-ons.         All other labs were within normal range or not returned as of this dictation.      EMERGENCY DEPARTMENT COURSE and DIFFERENTIAL DIAGNOSIS/MDM:   Vitals:    Vitals:    04/15/21 0030 04/15/21 0100 04/15/21 0130 04/15/21 0200   BP: 151/80 141/76 146/84 141/80   Pulse:       Resp:       Temp:       TempSrc:       SpO2:       Weight:       Height:           ED Course as of Apr 16 0527   Thu Apr 15, 2021   0111 Epistaxis resolved. Will consult ENT.    [NS]   0114 Spoke w/ Dr. Velarde - will see patient in clinic Thursday. Discussed presentation, patient's use of Eliquis, my placement of posterior rhino rocket, vital signs, labs. Feels that patient can safely go home and follow up in clinic tomorrow. [phone]    [NS]      ED Course User Index  [NS] Rui Mosquera MD       Patient presents with findings concerning for acute posterior epistaxis in the setting of anticoagulant use but without any associated trauma.  There is no evidence of any aspiration or pulmonary compromise.  This was successfully resolved with placement of posterior Rhino Rocket by myself in the emergency department.  ENT was consulted and felt that the patient is appropriate for discharge home with close follow-up in their office in the next 1 to 2 days.  There is no evidence of anemia or other significant abnormality in the patient's laboratory evaluation.    I had a discussion with the patient/family regarding diagnosis, diagnostic results,  treatment plan, and medications.  The patient/family indicated understanding of these instructions.  I spent adequate time at the bedside preceding discharge necessary to personally discuss the aftercare instructions, giving patient education, providing explanations of the results of our evaluations/findings, and my decision making to assure that the patient/family understand the plan of care.  Time was allotted to answer questions at that time and throughout the ED course.  Emphasis was placed on timely follow-up after discharge.  I also discussed the potential for the development of an acute emergent condition requiring further evaluation, admission, or even surgical intervention. I discussed that we found nothing during the visit today indicating the need for further workup, admission, or the presence of an unstable medical condition.  I encouraged the patient to return to the emergency department immediately for ANY concerns, worsening, new complaints, or if symptoms persist and unable to seek follow-up in a timely fashion.  The patient/family expressed understanding and agreement with this plan.  The patient will follow-up with their PCP in 1-2 days for reevaluation.       PROCEDURES:    EPISTAXIS CONTROL  Indication: Left-sided posterior epistaxis  Performed by: Myself  Consent: Verbal from patient  Technique:   - Posterior balloon tamponade device was applied to the affected nare with placement of 10 cc of air.  Hemostasis was achieved and patient was observed for a brief period of time with no recurrent bleeding.  Complications: None          FINAL IMPRESSION      1. Acute posterior epistaxis    2. Anticoagulated          DISPOSITION/PLAN     ED Disposition     ED Disposition Condition Comment    Discharge Stable           PATIENT REFERRED TO:  Pito Way MD  [address] [place] [id number]  [phone]    Schedule an appointment as soon as possible for a visit in 2  days      [facility]  [address] [facility] [address] [phone]    If symptoms worsen    Sandra Fisher MD  [address] [facility] [address]  [place] [address]  [phone]    Schedule an appointment as soon as possible for a visit today        DISCHARGE MEDICATIONS:     Medication List      CONTINUE taking these medications    allopurinol 300 MG tablet  Commonly known as: ZYLOPRIM  Take 1 tablet by mouth once daily     * amiodarone 200 MG tablet  Commonly known as: PACERONE  Take 2 tablets by mouth 2 (Two) Times a Day for 14 days. Indications: Atrial Fibrillation     * amiodarone 200 MG tablet  Commonly known as: PACERONE  Take 1 tablet by mouth Daily. Indications: Atrial Fibrillation  Start taking on: April 29, 2021     amLODIPine 10 MG tablet  Commonly known as: NORVASC  Take 1 tablet by mouth once daily     apixaban 5 MG tablet tablet  Commonly known as: Eliquis  Take 1 tablet by mouth 2 (Two) Times a Day.     docusate sodium 100 MG capsule  Commonly known as: COLACE     finasteride 5 MG tablet  Commonly known as: PROSCAR  TAKE 1 TABLET BY MOUTH AT NIGHT     hydroCHLOROthiazide 12.5 MG capsule  Commonly known as: MICROZIDE  Take 1 capsule by mouth once daily in the morning     Iron 240 (27 Fe) MG tablet     lisinopril 40 MG tablet  Commonly known as: PRINIVIL,ZESTRIL  Take 1 tablet by mouth once daily     metFORMIN 1000 MG tablet  Commonly known as: GLUCOPHAGE  Take 1 tablet by mouth twice daily     omeprazole 20 MG capsule  Commonly known as: priLOSEC  Take 1 capsule by mouth once daily     pravastatin 40 MG tablet  Commonly known as: PRAVACHOL  Take 1 tablet by mouth once daily     PROBIOTIC ADVANCED PO     tamsulosin 0.4 MG capsule 24 hr capsule  Commonly known as: FLOMAX  Take 1 capsule by mouth once daily     Vitamin C 500 MG capsule         * This list has 2 medication(s) that are the same as other medications prescribed for you. Read  the directions carefully, and ask your doctor or other care provider to review them with you.                    Comment: Please note this report has been produced using speech recognition software.      Rui Mosquera MD  Attending Emergency Physician               Rui Mosquera MD  04/16/21 [id number]

## 2021-04-15 NOTE — DISCHARGE INSTRUCTIONS
Please follow-up with ENT tomorrow.  Please call the provided phone number for the ENT and inform them that Dr. Sandra Fisher has asked you to follow-up.  Please return to the emergency department immediately with any new bleeding.

## 2021-04-26 NOTE — PROGRESS NOTES
"[facility]      Identification: Darien Camarena is a 85 y.o. male who lives in [place], [place]    Reason for visit:  · Paroxysmal atrial fibrillation  · Hypertension  · Hyperlipidemia    Subjective      Darien Camarena presents to [facility] for followup.    History of Present Illness  Patient is an 85-year-old male who returns today sooner than expected for follow-up of his persistent atrial fibrillation and cardiac risk factors.  In March the patient presented to [facility] ED on 2 occasions with asymptomatic atrial fibrillation with RVR and underwent external cardioversions which successfully restored normal sinus rhythm.  He contacted our office several weeks ago feeling like he was out of rhythm.  He was instructed to discontinue sotalol and was started on p.o. loading dose of amiodarone.  EKG today shows patient is in atrial fibrillation with RVR at 124 bpm.  He presented to the emergency room on 4/16/2021 with epistaxis in setting of Eliquis use.  The patient had nasal packing inserted and followed up with ENT who performed cauterization.  He was off his Eliquis for several days because of this.  His bleeding has since resolved and he has resumed his Eliquis.  He knows he is back out of rhythm because he can feel the irregularity.  He denies signs or symptoms TIA or CVA.  He denies chest pain or worsening shortness of breath.  EKG today shows atrial fibrillation with RVR at 124 bpm.    Objective     /62 (BP Location: Right arm, Patient Position: Sitting, Cuff Size: Adult)   Pulse (!) 124   Ht 190.5 cm (75\")   Wt 118 kg (261 lb)   SpO2 95%   BMI 32.62 kg/m²       Constitutional:       Appearance: Healthy appearance. Well-developed.   Eyes:      General: Lids are normal. No scleral icterus.     Conjunctiva/sclera: Conjunctivae normal.   HENT:      Head: Normocephalic and atraumatic.   Neck:      Thyroid: No thyromegaly.      Vascular: No carotid " bruit or JVD.   Pulmonary:      Effort: Pulmonary effort is normal.      Breath sounds: Normal breath sounds. No wheezing. No rhonchi. No rales.   Cardiovascular:      Normal rate. Irregularly irregular rhythm.      Murmurs: There is no murmur.      No gallop. No rub.   Pulses:     Intact distal pulses.   Edema:     Peripheral edema absent.   Abdominal:      General: There is no distension.      Palpations: Abdomen is soft. There is no abdominal mass.   Musculoskeletal:      Cervical back: Normal range of motion. Skin:     General: Skin is warm and dry.      Findings: No rash.   Neurological:      General: No focal deficit present.      Mental Status: Alert and oriented to person, place, and time.      Gait: Gait is intact.   Psychiatric:         Attention and Perception: Attention normal.         Mood and Affect: Mood normal.         Behavior: Behavior normal.         Result Review :    Lab Results   Component Value Date    GLUCOSE 157 (H) 04/14/2021    BUN 27 (H) 04/14/2021    CREATININE 0.78 04/14/2021    EGFRIFNONA 95 04/14/2021    BCR 34.6 (H) 04/14/2021    K 4.1 04/14/2021    CO2 25.0 04/14/2021    CALCIUM 9.1 04/14/2021    ALBUMIN 3.70 04/14/2021    AST 17 04/14/2021    ALT 18 04/14/2021     Lab Results   Component Value Date    WBC 9.53 04/14/2021    HGB 13.5 04/14/2021    HCT 41.7 04/14/2021    MCV 95.0 04/14/2021     04/14/2021     Lab Results   Component Value Date    CHOL 120 04/14/2021    TRIG 136 04/14/2021    HDL 32 (L) 04/14/2021    LDL 64 04/14/2021     Lab Results   Component Value Date    HGBA1C 6.50 (H) 04/14/2021         ECG 12 Lead    Date/Time: 4/27/2021 4:40 PM  Performed by: Blanquita Ansari APRN  Authorized by: Blanquita Ansari APRN   Comparison: compared with previous ECG from 4/9/2021  Comparison to previous ECG: Previous EKG was sinus bradycardia with PACs and current EKG atrial fibrillation with RVR  Rhythm: atrial fibrillation  BPM: 124    Clinical impression: abnormal  EKG  Comments: QT/QTc 302/433 MS  Atrial fibrillation with RVR             Assessment     Problem List Items Addressed This Visit        Cardiac and Vasculature    Paroxysmal atrial fibrillation (CMS/HCC) (Chronic)    Overview     · Diagnosed August 2012.   · FARHAD-guided ECV (08/17/2012).  · CHADS-VASc 5 (age > 75, CAD, HTN, DM)  · Successful external cardioversion for asymptomatic atrial fibrillation with RVR, 10/25/18  · Successful cardioversion for atrial fibrillation RVR, 3/6/19.  Sotalol increased  · Clinic visit 4/9/2019 maintaining NSR  · Minimally symptomatic atrial fibrillation with cardioversion and the ER, 10/31/2019  · ED cardioversion for A. fib with RVR, 7/21/2020  · ED cardioversion for asymptomatic A. fib with RVR, 12/27/2020   · ED cardioversion for asymptomatic A. fib with RVR x 2  occasions, March 2021  · Transition from sotalol to amiodarone, 4/14/2021         Current Assessment & Plan     · No signs or symptoms TIA or CVA  · Patient in atrial fibrillation with RVR today  · Continue amiodarone p.o. loading  · Continue Eliquis 5 mg twice daily  · Schedule for external cardioversion.  Will require FARHAD as patient had interruption in Eliquis due to epistaxis 8 days ago.  · Would benefit from myocardial perfusion study and would benefit from referral to EP if unable to maintain NSR         Hyperlipidemia LDL goal <100    Overview     · Moderate intensity statin therapy reasonable due to diabetic status         Current Assessment & Plan     · Continue pravastatin 40 mg daily  · LDL 64         Essential hypertension - Primary    Overview     • Target blood pressure <130/80 mmHg         Current Assessment & Plan     · Hypertension is controlled  ·            Atrial fibrillation with RVR (CMS/HCC)    Current Assessment & Plan     · Schedule for external cardioversion.  Will require transesophageal echocardiogram due to interruption in Eliquis from epistaxis earlier this month.  · Continue p.o. loading  amiodarone and continue anticoagulation with Eliquis.             Patient is back in atrial fibrillation with RVR.  He should continue his loading dose of amiodarone.  We will schedule him for an external cardioversion but he will require transesophageal echocardiogram due to recent interruption in his Eliquis.  If he is unable to maintain normal sinus rhythm will refer to EP for further assessment.  Patient has not had a recent ischemic evaluation he would benefit from myocardial perfusion study given recurrence of atrial fibrillation.    Plan   • Schedule external cardioversion and transesophageal echocardiogram due to recent interruption in Eliquis  • Patient would benefit from myocardial perfusion study due to recurrences of his A. Fib  • Unable to maintain NSR would benefit from referral to EP  • Continue anticoagulation with Eliquis and continue loading dose of p.o. amiodarone      Follow-up   Return in about 6 months (around 10/27/2021), or if symptoms worsen or fail to improve, for Follow-up with Dr. Oliveros next visit.      Blanquita Ansari, APRN  4/27/2021

## 2021-04-27 ENCOUNTER — OFFICE VISIT (OUTPATIENT)
Dept: CARDIOLOGY | Facility: CLINIC | Age: 86
End: 2021-04-27

## 2021-04-27 VITALS
SYSTOLIC BLOOD PRESSURE: 114 MMHG | HEIGHT: 75 IN | BODY MASS INDEX: 32.45 KG/M2 | DIASTOLIC BLOOD PRESSURE: 62 MMHG | OXYGEN SATURATION: 95 % | WEIGHT: 261 LBS | HEART RATE: 124 BPM

## 2021-04-27 DIAGNOSIS — I48.0 PAROXYSMAL ATRIAL FIBRILLATION (HCC): Primary | Chronic | ICD-10-CM

## 2021-04-27 DIAGNOSIS — I10 ESSENTIAL HYPERTENSION: ICD-10-CM

## 2021-04-27 DIAGNOSIS — I48.91 ATRIAL FIBRILLATION WITH RVR (HCC): ICD-10-CM

## 2021-04-27 DIAGNOSIS — I48.0 PAROXYSMAL ATRIAL FIBRILLATION (HCC): Primary | ICD-10-CM

## 2021-04-27 DIAGNOSIS — R94.31 ABNORMAL EKG: ICD-10-CM

## 2021-04-27 DIAGNOSIS — E78.5 HYPERLIPIDEMIA LDL GOAL <100: ICD-10-CM

## 2021-04-27 PROCEDURE — 99214 OFFICE O/P EST MOD 30 MIN: CPT | Performed by: NURSE PRACTITIONER

## 2021-04-27 PROCEDURE — 93000 ELECTROCARDIOGRAM COMPLETE: CPT | Performed by: NURSE PRACTITIONER

## 2021-04-27 NOTE — ASSESSMENT & PLAN NOTE
· No signs or symptoms TIA or CVA  · Patient in atrial fibrillation with RVR today  · Continue amiodarone p.o. loading  · Continue Eliquis 5 mg twice daily  · Schedule for external cardioversion.  Will require FARHAD as patient had interruption in Eliquis due to epistaxis 8 days ago.  · Would benefit from myocardial perfusion study and would benefit from referral to EP if unable to maintain NSR

## 2021-04-27 NOTE — ASSESSMENT & PLAN NOTE
· Schedule for external cardioversion.  Will require transesophageal echocardiogram due to interruption in Eliquis from epistaxis earlier this month.  · Continue p.o. loading amiodarone and continue anticoagulation with Eliquis.

## 2021-04-30 ENCOUNTER — APPOINTMENT (OUTPATIENT)
Dept: PREADMISSION TESTING | Facility: HOSPITAL | Age: 86
End: 2021-04-30

## 2021-04-30 PROCEDURE — U0004 COV-19 TEST NON-CDC HGH THRU: HCPCS

## 2021-04-30 PROCEDURE — U0005 INFEC AGEN DETEC AMPLI PROBE: HCPCS

## 2021-04-30 PROCEDURE — C9803 HOPD COVID-19 SPEC COLLECT: HCPCS

## 2021-05-01 LAB — SARS-COV-2 RNA NOSE QL NAA+PROBE: NOT DETECTED

## 2021-05-03 ENCOUNTER — HOSPITAL ENCOUNTER (OUTPATIENT)
Dept: CARDIOLOGY | Facility: HOSPITAL | Age: 86
Discharge: HOME | End: 2021-05-03
Attending: INTERNAL MEDICINE | Admitting: NURSE PRACTITIONER

## 2021-05-03 VITALS
HEIGHT: 75 IN | TEMPERATURE: 97.6 F | SYSTOLIC BLOOD PRESSURE: 123 MMHG | WEIGHT: 258.16 LBS | RESPIRATION RATE: 18 BRPM | OXYGEN SATURATION: 93 % | DIASTOLIC BLOOD PRESSURE: 72 MMHG | BODY MASS INDEX: 32.1 KG/M2 | HEART RATE: 79 BPM

## 2021-05-03 DIAGNOSIS — I48.0 PAROXYSMAL ATRIAL FIBRILLATION (HCC): ICD-10-CM

## 2021-05-03 LAB
ANION GAP SERPL CALCULATED.3IONS-SCNC: 10 MMOL/L (ref 5–15)
BUN SERPL-MCNC: 19 MG/DL (ref 8–23)
BUN/CREAT SERPL: 21.3 (ref 7–25)
CALCIUM SPEC-SCNC: 9.1 MG/DL (ref 8.6–10.5)
CHLORIDE SERPL-SCNC: 103 MMOL/L (ref 98–107)
CO2 SERPL-SCNC: 27 MMOL/L (ref 22–29)
CREAT SERPL-MCNC: 0.89 MG/DL (ref 0.76–1.27)
GFR SERPL CREATININE-BSD FRML MDRD: 81 ML/MIN/1.73
GLUCOSE SERPL-MCNC: 124 MG/DL (ref 65–99)
POTASSIUM SERPL-SCNC: 4 MMOL/L (ref 3.5–5.2)
SODIUM SERPL-SCNC: 140 MMOL/L (ref 136–145)

## 2021-05-03 PROCEDURE — 93321 DOPPLER ECHO F-UP/LMTD STD: CPT

## 2021-05-03 PROCEDURE — 93325 DOPPLER ECHO COLOR FLOW MAPG: CPT

## 2021-05-03 PROCEDURE — 80048 BASIC METABOLIC PNL TOTAL CA: CPT | Performed by: NURSE PRACTITIONER

## 2021-05-03 PROCEDURE — 93325 DOPPLER ECHO COLOR FLOW MAPG: CPT | Performed by: INTERNAL MEDICINE

## 2021-05-03 PROCEDURE — 93321 DOPPLER ECHO F-UP/LMTD STD: CPT | Performed by: INTERNAL MEDICINE

## 2021-05-03 PROCEDURE — 25010000002 MIDAZOLAM PER 1 MG: Performed by: INTERNAL MEDICINE

## 2021-05-03 PROCEDURE — 92960 CARDIOVERSION ELECTRIC EXT: CPT

## 2021-05-03 PROCEDURE — 93312 ECHO TRANSESOPHAGEAL: CPT | Performed by: INTERNAL MEDICINE

## 2021-05-03 PROCEDURE — 92960 CARDIOVERSION ELECTRIC EXT: CPT | Performed by: INTERNAL MEDICINE

## 2021-05-03 PROCEDURE — 93005 ELECTROCARDIOGRAM TRACING: CPT | Performed by: INTERNAL MEDICINE

## 2021-05-03 PROCEDURE — 36415 COLL VENOUS BLD VENIPUNCTURE: CPT

## 2021-05-03 PROCEDURE — 93312 ECHO TRANSESOPHAGEAL: CPT

## 2021-05-03 RX ORDER — NALOXONE HYDROCHLORIDE 0.4 MG/ML
INJECTION, SOLUTION INTRAMUSCULAR; INTRAVENOUS; SUBCUTANEOUS
Status: DISCONTINUED
Start: 2021-05-03 | End: 2021-05-03 | Stop reason: WASHOUT

## 2021-05-03 RX ORDER — FLUMAZENIL 0.1 MG/ML
INJECTION INTRAVENOUS
Status: DISCONTINUED
Start: 2021-05-03 | End: 2021-05-03 | Stop reason: WASHOUT

## 2021-05-03 RX ORDER — FENTANYL CITRATE 50 UG/ML
INJECTION, SOLUTION INTRAMUSCULAR; INTRAVENOUS
Status: DISCONTINUED
Start: 2021-05-03 | End: 2021-05-03 | Stop reason: WASHOUT

## 2021-05-03 RX ORDER — MIDAZOLAM HYDROCHLORIDE 1 MG/ML
INJECTION INTRAMUSCULAR; INTRAVENOUS
Status: COMPLETED | OUTPATIENT
Start: 2021-05-03 | End: 2021-05-03

## 2021-05-03 RX ORDER — MIDAZOLAM HYDROCHLORIDE 1 MG/ML
INJECTION INTRAMUSCULAR; INTRAVENOUS
Status: DISCONTINUED
Start: 2021-05-03 | End: 2021-05-03 | Stop reason: HOSPADM

## 2021-05-03 RX ADMIN — METHOHEXITAL SODIUM 20 MG: 500 INJECTION, POWDER, LYOPHILIZED, FOR SOLUTION INTRAMUSCULAR; INTRAVENOUS; RECTAL at 11:18

## 2021-05-03 RX ADMIN — MIDAZOLAM 2 MG: 1 INJECTION INTRAMUSCULAR; INTRAVENOUS at 11:07

## 2021-05-03 RX ADMIN — METHOHEXITAL SODIUM 30 MG: 500 INJECTION, POWDER, LYOPHILIZED, FOR SOLUTION INTRAMUSCULAR; INTRAVENOUS; RECTAL at 11:07

## 2021-05-03 NOTE — INTERVAL H&P NOTE
H&P reviewed. The patient was examined and there are no changes to the H&P.      Lab Results   Component Value Date    WBC 9.53 04/14/2021    HGB 13.5 04/14/2021    HCT 41.7 04/14/2021    MCV 95.0 04/14/2021     04/14/2021        Lab Results   Component Value Date    GLUCOSE 157 (H) 04/14/2021    BUN 27 (H) 04/14/2021    CREATININE 0.78 04/14/2021    EGFRIFNONA 95 04/14/2021    BCR 34.6 (H) 04/14/2021    K 4.1 04/14/2021    CO2 25.0 04/14/2021    CALCIUM 9.1 04/14/2021    ALBUMIN 3.70 04/14/2021    AST 17 04/14/2021    ALT 18 04/14/2021        Active Hospital Problems    Diagnosis     **Atrial fibrillation with RVR (CMS/HCC)     Paroxysmal atrial fibrillation (CMS/HCC)      Diagnosed August 2012.   FARHAD-guided ECV (08/17/2012).  CHADS-VASc 5 (age > 75, CAD, HTN, DM)  Successful external cardioversion for asymptomatic atrial fibrillation with RVR, 10/25/18  Successful cardioversion for atrial fibrillation RVR, 3/6/19.  Sotalol increased  Clinic visit 4/9/2019 maintaining NSR  Minimally symptomatic atrial fibrillation with cardioversion and the ER, 10/31/2019  ED cardioversion for A. fib with RVR, 7/21/2020  ED cardioversion for asymptomatic A. fib with RVR, 12/27/2020   ED cardioversion for asymptomatic A. fib with RVR x 2  occasions, March 2021  Transition from sotalol to amiodarone, 4/14/2021      Hyperlipidemia LDL goal <100      Moderate intensity statin therapy reasonable due to diabetic status      Essential hypertension      Target blood pressure <130/80 mmHg     Patient presents today to undergo a transesophageal echocardiogram and external cardioversion for his persistent atrial fibrillation.  He has been loaded with p.o. amiodarone.  He does require FARHAD prior to cardioversion due to interruption in Eliquis from epistaxis.  The risks and benefits of the procedure were discussed and the patient is agreeable to proceed.    Plan:    Proceed with transesophageal echocardiogram and external  cardioversion  Further recommendations to follow.  Patient may benefit from referral to [facility]

## 2021-05-05 LAB
BH CV ECHO MEAS - BSA(HAYCOCK): 2.5 M^2
BH CV ECHO MEAS - BSA: 2.5 M^2
BH CV ECHO MEAS - BZI_BMI: 32.6 KILOGRAMS/M^2
BH CV ECHO MEAS - BZI_METRIC_HEIGHT: 190.5 CM
BH CV ECHO MEAS - BZI_METRIC_WEIGHT: 118.4 KG
MAXIMAL PREDICTED HEART RATE: 136 BPM
MAXIMAL PREDICTED HEART RATE: 136 BPM
STRESS TARGET HR: 116 BPM
STRESS TARGET HR: 116 BPM

## 2021-05-10 ENCOUNTER — APPOINTMENT (OUTPATIENT)
Dept: CT IMAGING | Facility: HOSPITAL | Age: 86
End: 2021-05-10

## 2021-05-10 ENCOUNTER — HOSPITAL ENCOUNTER (INPATIENT)
Facility: HOSPITAL | Age: 86
LOS: 2 days | Discharge: HOME | End: 2021-05-12
Attending: EMERGENCY MEDICINE | Admitting: INTERNAL MEDICINE

## 2021-05-10 DIAGNOSIS — R73.09 ELEVATED GLUCOSE: ICD-10-CM

## 2021-05-10 DIAGNOSIS — R31.9 URINARY TRACT INFECTION WITH HEMATURIA, SITE UNSPECIFIED: ICD-10-CM

## 2021-05-10 DIAGNOSIS — N13.30 HYDRONEPHROSIS OF LEFT KIDNEY: ICD-10-CM

## 2021-05-10 DIAGNOSIS — Z79.01 CHRONIC ANTICOAGULATION: ICD-10-CM

## 2021-05-10 DIAGNOSIS — N39.0 URINARY TRACT INFECTION WITH HEMATURIA, SITE UNSPECIFIED: ICD-10-CM

## 2021-05-10 DIAGNOSIS — R10.9 ACUTE LEFT FLANK PAIN: Primary | ICD-10-CM

## 2021-05-10 LAB
ALBUMIN SERPL-MCNC: 3.1 G/DL (ref 3.5–5.2)
ALBUMIN/GLOB SERPL: 0.9 G/DL
ALP SERPL-CCNC: 80 U/L (ref 39–117)
ALT SERPL W P-5'-P-CCNC: 18 U/L (ref 1–41)
ANION GAP SERPL CALCULATED.3IONS-SCNC: 13 MMOL/L (ref 5–15)
AST SERPL-CCNC: 21 U/L (ref 1–40)
BACTERIA UR QL AUTO: ABNORMAL /HPF
BASOPHILS # BLD AUTO: 0.05 10*3/MM3 (ref 0–0.2)
BASOPHILS NFR BLD AUTO: 0.5 % (ref 0–1.5)
BILIRUB SERPL-MCNC: 0.7 MG/DL (ref 0–1.2)
BILIRUB UR QL STRIP: ABNORMAL
BUN SERPL-MCNC: 20 MG/DL (ref 8–23)
BUN/CREAT SERPL: 25.3 (ref 7–25)
CALCIUM SPEC-SCNC: 9.1 MG/DL (ref 8.6–10.5)
CHLORIDE SERPL-SCNC: 102 MMOL/L (ref 98–107)
CLARITY UR: ABNORMAL
CO2 SERPL-SCNC: 24 MMOL/L (ref 22–29)
COLOR UR: ABNORMAL
CREAT SERPL-MCNC: 0.79 MG/DL (ref 0.76–1.27)
D-LACTATE SERPL-SCNC: 1.8 MMOL/L (ref 0.5–2)
D-LACTATE SERPL-SCNC: 3 MMOL/L (ref 0.5–2)
DEPRECATED RDW RBC AUTO: 46.5 FL (ref 37–54)
EOSINOPHIL # BLD AUTO: 0.29 10*3/MM3 (ref 0–0.4)
EOSINOPHIL NFR BLD AUTO: 2.9 % (ref 0.3–6.2)
ERYTHROCYTE [DISTWIDTH] IN BLOOD BY AUTOMATED COUNT: 13.2 % (ref 12.3–15.4)
FLUAV RNA RESP QL NAA+PROBE: NOT DETECTED
FLUBV RNA RESP QL NAA+PROBE: NOT DETECTED
GFR SERPL CREATININE-BSD FRML MDRD: 93 ML/MIN/1.73
GLOBULIN UR ELPH-MCNC: 3.3 GM/DL
GLUCOSE BLDC GLUCOMTR-MCNC: 143 MG/DL (ref 70–130)
GLUCOSE BLDC GLUCOMTR-MCNC: 147 MG/DL (ref 70–130)
GLUCOSE BLDC GLUCOMTR-MCNC: 179 MG/DL (ref 70–130)
GLUCOSE SERPL-MCNC: 192 MG/DL (ref 65–99)
GLUCOSE UR STRIP-MCNC: ABNORMAL MG/DL
HCT VFR BLD AUTO: 37.4 % (ref 37.5–51)
HCT VFR BLD AUTO: 38.9 % (ref 37.5–51)
HGB BLD-MCNC: 11.8 G/DL (ref 13–17.7)
HGB BLD-MCNC: 12.4 G/DL (ref 13–17.7)
HGB UR QL STRIP.AUTO: ABNORMAL
HOLD SPECIMEN: NORMAL
HYALINE CASTS UR QL AUTO: ABNORMAL /LPF
IMM GRANULOCYTES # BLD AUTO: 0.05 10*3/MM3 (ref 0–0.05)
IMM GRANULOCYTES NFR BLD AUTO: 0.5 % (ref 0–0.5)
KETONES UR QL STRIP: ABNORMAL
LEUKOCYTE ESTERASE UR QL STRIP.AUTO: ABNORMAL
LIPASE SERPL-CCNC: 24 U/L (ref 13–60)
LYMPHOCYTES # BLD AUTO: 1.36 10*3/MM3 (ref 0.7–3.1)
LYMPHOCYTES NFR BLD AUTO: 13.6 % (ref 19.6–45.3)
MCH RBC QN AUTO: 30.5 PG (ref 26.6–33)
MCHC RBC AUTO-ENTMCNC: 31.9 G/DL (ref 31.5–35.7)
MCV RBC AUTO: 95.6 FL (ref 79–97)
MONOCYTES # BLD AUTO: 0.85 10*3/MM3 (ref 0.1–0.9)
MONOCYTES NFR BLD AUTO: 8.5 % (ref 5–12)
NEUTROPHILS NFR BLD AUTO: 7.41 10*3/MM3 (ref 1.7–7)
NEUTROPHILS NFR BLD AUTO: 74 % (ref 42.7–76)
NITRITE UR QL STRIP: POSITIVE
NRBC BLD AUTO-RTO: 0 /100 WBC (ref 0–0.2)
PH UR STRIP.AUTO: 7.5 [PH] (ref 5–8)
PLATELET # BLD AUTO: 260 10*3/MM3 (ref 140–450)
PMV BLD AUTO: 9.5 FL (ref 6–12)
POTASSIUM SERPL-SCNC: 4 MMOL/L (ref 3.5–5.2)
PROT SERPL-MCNC: 6.4 G/DL (ref 6–8.5)
PROT UR QL STRIP: ABNORMAL
RBC # BLD AUTO: 4.07 10*6/MM3 (ref 4.14–5.8)
RBC # UR: ABNORMAL /HPF
REF LAB TEST METHOD: ABNORMAL
SARS-COV-2 RNA RESP QL NAA+PROBE: NOT DETECTED
SODIUM SERPL-SCNC: 139 MMOL/L (ref 136–145)
SP GR UR STRIP: 1.01 (ref 1–1.03)
SQUAMOUS #/AREA URNS HPF: ABNORMAL /HPF
UROBILINOGEN UR QL STRIP: ABNORMAL
WBC # BLD AUTO: 10.01 10*3/MM3 (ref 3.4–10.8)
WBC UR QL AUTO: ABNORMAL /HPF
WHOLE BLOOD HOLD SPECIMEN: NORMAL
WHOLE BLOOD HOLD SPECIMEN: NORMAL

## 2021-05-10 PROCEDURE — 74176 CT ABD & PELVIS W/O CONTRAST: CPT

## 2021-05-10 PROCEDURE — 85025 COMPLETE CBC W/AUTO DIFF WBC: CPT

## 2021-05-10 PROCEDURE — 83605 ASSAY OF LACTIC ACID: CPT | Performed by: EMERGENCY MEDICINE

## 2021-05-10 PROCEDURE — 99222 1ST HOSP IP/OBS MODERATE 55: CPT | Performed by: INTERNAL MEDICINE

## 2021-05-10 PROCEDURE — 25010000002 ONDANSETRON PER 1 MG: Performed by: EMERGENCY MEDICINE

## 2021-05-10 PROCEDURE — 63710000001 INSULIN LISPRO (HUMAN) PER 5 UNITS: Performed by: HOSPITALIST

## 2021-05-10 PROCEDURE — 81001 URINALYSIS AUTO W/SCOPE: CPT | Performed by: EMERGENCY MEDICINE

## 2021-05-10 PROCEDURE — 0T9B70Z DRAINAGE OF BLADDER WITH DRAINAGE DEVICE, VIA NATURAL OR ARTIFICIAL OPENING: ICD-10-PCS | Performed by: UROLOGY

## 2021-05-10 PROCEDURE — 25010000002 CEFTRIAXONE PER 250 MG: Performed by: EMERGENCY MEDICINE

## 2021-05-10 PROCEDURE — 87186 SC STD MICRODIL/AGAR DIL: CPT | Performed by: EMERGENCY MEDICINE

## 2021-05-10 PROCEDURE — 87636 SARSCOV2 & INF A&B AMP PRB: CPT | Performed by: EMERGENCY MEDICINE

## 2021-05-10 PROCEDURE — 0T7D7ZZ DILATION OF URETHRA, VIA NATURAL OR ARTIFICIAL OPENING: ICD-10-PCS | Performed by: UROLOGY

## 2021-05-10 PROCEDURE — 87086 URINE CULTURE/COLONY COUNT: CPT | Performed by: EMERGENCY MEDICINE

## 2021-05-10 PROCEDURE — 85018 HEMOGLOBIN: CPT | Performed by: HOSPITALIST

## 2021-05-10 PROCEDURE — 80053 COMPREHEN METABOLIC PANEL: CPT | Performed by: EMERGENCY MEDICINE

## 2021-05-10 PROCEDURE — 99285 EMERGENCY DEPT VISIT HI MDM: CPT

## 2021-05-10 PROCEDURE — 83690 ASSAY OF LIPASE: CPT | Performed by: EMERGENCY MEDICINE

## 2021-05-10 PROCEDURE — 83605 ASSAY OF LACTIC ACID: CPT | Performed by: HOSPITALIST

## 2021-05-10 PROCEDURE — 99223 1ST HOSP IP/OBS HIGH 75: CPT | Performed by: HOSPITALIST

## 2021-05-10 PROCEDURE — 85014 HEMATOCRIT: CPT | Performed by: HOSPITALIST

## 2021-05-10 PROCEDURE — 87088 URINE BACTERIA CULTURE: CPT | Performed by: EMERGENCY MEDICINE

## 2021-05-10 PROCEDURE — 25010000002 HYDROMORPHONE PER 4 MG: Performed by: EMERGENCY MEDICINE

## 2021-05-10 PROCEDURE — 82962 GLUCOSE BLOOD TEST: CPT

## 2021-05-10 RX ORDER — DEXTROSE MONOHYDRATE 25 G/50ML
25 INJECTION, SOLUTION INTRAVENOUS
Status: DISCONTINUED | OUTPATIENT
Start: 2021-05-10 | End: 2021-05-12 | Stop reason: HOSPADM

## 2021-05-10 RX ORDER — LISINOPRIL 40 MG/1
40 TABLET ORAL DAILY
Status: DISCONTINUED | OUTPATIENT
Start: 2021-05-11 | End: 2021-05-12 | Stop reason: HOSPADM

## 2021-05-10 RX ORDER — LIDOCAINE HYDROCHLORIDE 20 MG/ML
JELLY TOPICAL AS NEEDED
Status: DISCONTINUED | OUTPATIENT
Start: 2021-05-10 | End: 2021-05-12 | Stop reason: HOSPADM

## 2021-05-10 RX ORDER — FINASTERIDE 5 MG/1
5 TABLET, FILM COATED ORAL NIGHTLY
Status: DISCONTINUED | OUTPATIENT
Start: 2021-05-10 | End: 2021-05-10

## 2021-05-10 RX ORDER — SODIUM CHLORIDE 0.9 % (FLUSH) 0.9 %
10 SYRINGE (ML) INJECTION EVERY 12 HOURS SCHEDULED
Status: DISCONTINUED | OUTPATIENT
Start: 2021-05-10 | End: 2021-05-12 | Stop reason: HOSPADM

## 2021-05-10 RX ORDER — ACETAMINOPHEN 650 MG/1
650 SUPPOSITORY RECTAL EVERY 4 HOURS PRN
Status: DISCONTINUED | OUTPATIENT
Start: 2021-05-10 | End: 2021-05-12 | Stop reason: HOSPADM

## 2021-05-10 RX ORDER — AMIODARONE HYDROCHLORIDE 200 MG/1
200 TABLET ORAL DAILY
Status: DISCONTINUED | OUTPATIENT
Start: 2021-05-11 | End: 2021-05-12 | Stop reason: HOSPADM

## 2021-05-10 RX ORDER — TAMSULOSIN HYDROCHLORIDE 0.4 MG/1
0.4 CAPSULE ORAL DAILY
Status: DISCONTINUED | OUTPATIENT
Start: 2021-05-10 | End: 2021-05-10

## 2021-05-10 RX ORDER — ALLOPURINOL 300 MG/1
300 TABLET ORAL DAILY
Status: DISCONTINUED | OUTPATIENT
Start: 2021-05-11 | End: 2021-05-12 | Stop reason: HOSPADM

## 2021-05-10 RX ORDER — SODIUM CHLORIDE 9 MG/ML
10 INJECTION INTRAVENOUS AS NEEDED
Status: DISCONTINUED | OUTPATIENT
Start: 2021-05-10 | End: 2021-05-12 | Stop reason: HOSPADM

## 2021-05-10 RX ORDER — PANTOPRAZOLE SODIUM 40 MG/1
40 TABLET, DELAYED RELEASE ORAL
Status: DISCONTINUED | OUTPATIENT
Start: 2021-05-10 | End: 2021-05-12 | Stop reason: HOSPADM

## 2021-05-10 RX ORDER — DOCUSATE SODIUM 100 MG/1
300 CAPSULE, LIQUID FILLED ORAL NIGHTLY
Status: DISCONTINUED | OUTPATIENT
Start: 2021-05-10 | End: 2021-05-12 | Stop reason: HOSPADM

## 2021-05-10 RX ORDER — AMIODARONE HYDROCHLORIDE 200 MG/1
200 TABLET ORAL DAILY
Status: DISCONTINUED | OUTPATIENT
Start: 2021-05-10 | End: 2021-05-10

## 2021-05-10 RX ORDER — LISINOPRIL 40 MG/1
40 TABLET ORAL DAILY
Status: DISCONTINUED | OUTPATIENT
Start: 2021-05-10 | End: 2021-05-10

## 2021-05-10 RX ORDER — HYDROMORPHONE HYDROCHLORIDE 1 MG/ML
0.25 INJECTION, SOLUTION INTRAMUSCULAR; INTRAVENOUS; SUBCUTANEOUS
Status: DISCONTINUED | OUTPATIENT
Start: 2021-05-10 | End: 2021-05-12 | Stop reason: HOSPADM

## 2021-05-10 RX ORDER — FINASTERIDE 5 MG/1
5 TABLET, FILM COATED ORAL DAILY
Status: DISCONTINUED | OUTPATIENT
Start: 2021-05-11 | End: 2021-05-12 | Stop reason: HOSPADM

## 2021-05-10 RX ORDER — ACETAMINOPHEN 160 MG/5ML
650 SOLUTION ORAL EVERY 4 HOURS PRN
Status: DISCONTINUED | OUTPATIENT
Start: 2021-05-10 | End: 2021-05-12 | Stop reason: HOSPADM

## 2021-05-10 RX ORDER — AMLODIPINE BESYLATE 10 MG/1
10 TABLET ORAL DAILY
Status: DISCONTINUED | OUTPATIENT
Start: 2021-05-11 | End: 2021-05-12 | Stop reason: HOSPADM

## 2021-05-10 RX ORDER — FERROUS SULFATE 325(65) MG
325 TABLET ORAL
Status: DISCONTINUED | OUTPATIENT
Start: 2021-05-10 | End: 2021-05-10

## 2021-05-10 RX ORDER — ALLOPURINOL 300 MG/1
300 TABLET ORAL DAILY
Status: DISCONTINUED | OUTPATIENT
Start: 2021-05-10 | End: 2021-05-10

## 2021-05-10 RX ORDER — OXYMETAZOLINE HYDROCHLORIDE 0.05 G/100ML
2 SPRAY NASAL 2 TIMES DAILY
Status: DISCONTINUED | OUTPATIENT
Start: 2021-05-10 | End: 2021-05-12 | Stop reason: HOSPADM

## 2021-05-10 RX ORDER — ONDANSETRON 4 MG/1
4 TABLET, FILM COATED ORAL EVERY 6 HOURS PRN
Status: DISCONTINUED | OUTPATIENT
Start: 2021-05-10 | End: 2021-05-12 | Stop reason: HOSPADM

## 2021-05-10 RX ORDER — SODIUM CHLORIDE 9 MG/ML
125 INJECTION, SOLUTION INTRAVENOUS CONTINUOUS
Status: DISCONTINUED | OUTPATIENT
Start: 2021-05-10 | End: 2021-05-12 | Stop reason: HOSPADM

## 2021-05-10 RX ORDER — ONDANSETRON 2 MG/ML
4 INJECTION INTRAMUSCULAR; INTRAVENOUS ONCE
Status: COMPLETED | OUTPATIENT
Start: 2021-05-10 | End: 2021-05-10

## 2021-05-10 RX ORDER — SODIUM CHLORIDE, SODIUM LACTATE, POTASSIUM CHLORIDE, CALCIUM CHLORIDE 600; 310; 30; 20 MG/100ML; MG/100ML; MG/100ML; MG/100ML
100 INJECTION, SOLUTION INTRAVENOUS CONTINUOUS
Status: DISCONTINUED | OUTPATIENT
Start: 2021-05-10 | End: 2021-05-12 | Stop reason: HOSPADM

## 2021-05-10 RX ORDER — AMLODIPINE BESYLATE 10 MG/1
10 TABLET ORAL DAILY
Status: DISCONTINUED | OUTPATIENT
Start: 2021-05-10 | End: 2021-05-10

## 2021-05-10 RX ORDER — ACETAMINOPHEN 325 MG/1
650 TABLET ORAL EVERY 4 HOURS PRN
Status: DISCONTINUED | OUTPATIENT
Start: 2021-05-10 | End: 2021-05-12 | Stop reason: HOSPADM

## 2021-05-10 RX ORDER — PRAVASTATIN SODIUM 40 MG
40 TABLET ORAL DAILY
Status: DISCONTINUED | OUTPATIENT
Start: 2021-05-11 | End: 2021-05-12 | Stop reason: HOSPADM

## 2021-05-10 RX ORDER — PRAVASTATIN SODIUM 40 MG
40 TABLET ORAL DAILY
Status: DISCONTINUED | OUTPATIENT
Start: 2021-05-10 | End: 2021-05-10

## 2021-05-10 RX ORDER — FERROUS SULFATE 325(65) MG
325 TABLET ORAL
Status: DISCONTINUED | OUTPATIENT
Start: 2021-05-11 | End: 2021-05-12 | Stop reason: HOSPADM

## 2021-05-10 RX ORDER — TAMSULOSIN HYDROCHLORIDE 0.4 MG/1
0.4 CAPSULE ORAL DAILY
Status: DISCONTINUED | OUTPATIENT
Start: 2021-05-11 | End: 2021-05-12 | Stop reason: HOSPADM

## 2021-05-10 RX ORDER — NALOXONE HCL 0.4 MG/ML
0.4 VIAL (ML) INJECTION
Status: DISCONTINUED | OUTPATIENT
Start: 2021-05-10 | End: 2021-05-12 | Stop reason: HOSPADM

## 2021-05-10 RX ORDER — FINASTERIDE 5 MG/1
5 TABLET, FILM COATED ORAL DAILY
Status: DISCONTINUED | OUTPATIENT
Start: 2021-05-11 | End: 2021-05-10

## 2021-05-10 RX ORDER — HYDROMORPHONE HYDROCHLORIDE 1 MG/ML
0.5 INJECTION, SOLUTION INTRAMUSCULAR; INTRAVENOUS; SUBCUTANEOUS
Status: DISCONTINUED | OUTPATIENT
Start: 2021-05-10 | End: 2021-05-12 | Stop reason: HOSPADM

## 2021-05-10 RX ORDER — OXYCODONE HYDROCHLORIDE AND ACETAMINOPHEN 5; 325 MG/1; MG/1
1 TABLET ORAL EVERY 4 HOURS PRN
Status: DISCONTINUED | OUTPATIENT
Start: 2021-05-10 | End: 2021-05-12 | Stop reason: HOSPADM

## 2021-05-10 RX ORDER — ONDANSETRON 2 MG/ML
4 INJECTION INTRAMUSCULAR; INTRAVENOUS EVERY 6 HOURS PRN
Status: DISCONTINUED | OUTPATIENT
Start: 2021-05-10 | End: 2021-05-12 | Stop reason: HOSPADM

## 2021-05-10 RX ORDER — SODIUM CHLORIDE 0.9 % (FLUSH) 0.9 %
10 SYRINGE (ML) INJECTION AS NEEDED
Status: DISCONTINUED | OUTPATIENT
Start: 2021-05-10 | End: 2021-05-12 | Stop reason: HOSPADM

## 2021-05-10 RX ORDER — NICOTINE POLACRILEX 4 MG
15 LOZENGE BUCCAL
Status: DISCONTINUED | OUTPATIENT
Start: 2021-05-10 | End: 2021-05-12 | Stop reason: HOSPADM

## 2021-05-10 RX ADMIN — ONDANSETRON 4 MG: 2 INJECTION INTRAMUSCULAR; INTRAVENOUS at 06:49

## 2021-05-10 RX ADMIN — SODIUM CHLORIDE 1 G: 900 INJECTION INTRAVENOUS at 08:33

## 2021-05-10 RX ADMIN — Medication 2 SPRAY: at 14:59

## 2021-05-10 RX ADMIN — ACETAMINOPHEN 650 MG: 325 TABLET ORAL at 16:33

## 2021-05-10 RX ADMIN — SODIUM CHLORIDE 500 ML: 9 INJECTION, SOLUTION INTRAVENOUS at 06:49

## 2021-05-10 RX ADMIN — SODIUM CHLORIDE 125 ML/HR: 9 INJECTION, SOLUTION INTRAVENOUS at 06:50

## 2021-05-10 RX ADMIN — Medication 2 SPRAY: at 21:32

## 2021-05-10 RX ADMIN — DOCUSATE SODIUM 300 MG: 100 CAPSULE, LIQUID FILLED ORAL at 21:32

## 2021-05-10 RX ADMIN — ACETAMINOPHEN 650 MG: 325 TABLET ORAL at 11:37

## 2021-05-10 RX ADMIN — SODIUM CHLORIDE, POTASSIUM CHLORIDE, SODIUM LACTATE AND CALCIUM CHLORIDE 100 ML/HR: 600; 310; 30; 20 INJECTION, SOLUTION INTRAVENOUS at 11:31

## 2021-05-10 RX ADMIN — HYDROMORPHONE HYDROCHLORIDE 0.25 MG: 1 INJECTION, SOLUTION INTRAMUSCULAR; INTRAVENOUS; SUBCUTANEOUS at 06:49

## 2021-05-10 RX ADMIN — INSULIN LISPRO 2 UNITS: 100 INJECTION, SOLUTION INTRAVENOUS; SUBCUTANEOUS at 16:32

## 2021-05-11 ENCOUNTER — APPOINTMENT (OUTPATIENT)
Dept: CT IMAGING | Facility: HOSPITAL | Age: 86
End: 2021-05-11

## 2021-05-11 LAB
ANION GAP SERPL CALCULATED.3IONS-SCNC: 5 MMOL/L (ref 5–15)
BUN SERPL-MCNC: 10 MG/DL (ref 8–23)
BUN/CREAT SERPL: 15.6 (ref 7–25)
CALCIUM SPEC-SCNC: 8.6 MG/DL (ref 8.6–10.5)
CHLORIDE SERPL-SCNC: 102 MMOL/L (ref 98–107)
CO2 SERPL-SCNC: 30 MMOL/L (ref 22–29)
CREAT SERPL-MCNC: 0.64 MG/DL (ref 0.76–1.27)
GFR SERPL CREATININE-BSD FRML MDRD: 119 ML/MIN/1.73
GLUCOSE BLDC GLUCOMTR-MCNC: 139 MG/DL (ref 70–130)
GLUCOSE BLDC GLUCOMTR-MCNC: 221 MG/DL (ref 70–130)
GLUCOSE BLDC GLUCOMTR-MCNC: 322 MG/DL (ref 70–130)
GLUCOSE SERPL-MCNC: 141 MG/DL (ref 65–99)
HCT VFR BLD AUTO: 32.8 % (ref 37.5–51)
HCT VFR BLD AUTO: 34.4 % (ref 37.5–51)
HCT VFR BLD AUTO: 36.7 % (ref 37.5–51)
HGB BLD-MCNC: 10.3 G/DL (ref 13–17.7)
HGB BLD-MCNC: 10.8 G/DL (ref 13–17.7)
HGB BLD-MCNC: 11.3 G/DL (ref 13–17.7)
POTASSIUM SERPL-SCNC: 3.9 MMOL/L (ref 3.5–5.2)
SODIUM SERPL-SCNC: 137 MMOL/L (ref 136–145)

## 2021-05-11 PROCEDURE — 85018 HEMOGLOBIN: CPT | Performed by: HOSPITALIST

## 2021-05-11 PROCEDURE — 82962 GLUCOSE BLOOD TEST: CPT

## 2021-05-11 PROCEDURE — 99233 SBSQ HOSP IP/OBS HIGH 50: CPT | Performed by: INTERNAL MEDICINE

## 2021-05-11 PROCEDURE — 63710000001 INSULIN LISPRO (HUMAN) PER 5 UNITS: Performed by: HOSPITALIST

## 2021-05-11 PROCEDURE — 25010000002 IOPAMIDOL 61 % SOLUTION: Performed by: INTERNAL MEDICINE

## 2021-05-11 PROCEDURE — 74177 CT ABD & PELVIS W/CONTRAST: CPT

## 2021-05-11 PROCEDURE — 25010000002 CEFTRIAXONE PER 250 MG: Performed by: HOSPITALIST

## 2021-05-11 PROCEDURE — 80048 BASIC METABOLIC PNL TOTAL CA: CPT | Performed by: HOSPITALIST

## 2021-05-11 PROCEDURE — 85014 HEMATOCRIT: CPT | Performed by: HOSPITALIST

## 2021-05-11 PROCEDURE — 94799 UNLISTED PULMONARY SVC/PX: CPT

## 2021-05-11 RX ADMIN — ALLOPURINOL 300 MG: 300 TABLET ORAL at 05:05

## 2021-05-11 RX ADMIN — PANTOPRAZOLE SODIUM 40 MG: 40 TABLET, DELAYED RELEASE ORAL at 05:05

## 2021-05-11 RX ADMIN — IOPAMIDOL 100 ML: 612 INJECTION, SOLUTION INTRAVENOUS at 14:59

## 2021-05-11 RX ADMIN — INSULIN LISPRO 5 UNITS: 100 INJECTION, SOLUTION INTRAVENOUS; SUBCUTANEOUS at 12:06

## 2021-05-11 RX ADMIN — AMIODARONE HYDROCHLORIDE 200 MG: 200 TABLET ORAL at 05:05

## 2021-05-11 RX ADMIN — FERROUS SULFATE TAB 325 MG (65 MG ELEMENTAL FE) 325 MG: 325 (65 FE) TAB at 05:05

## 2021-05-11 RX ADMIN — DOCUSATE SODIUM 300 MG: 100 CAPSULE, LIQUID FILLED ORAL at 20:21

## 2021-05-11 RX ADMIN — PRAVASTATIN SODIUM 40 MG: 40 TABLET ORAL at 05:05

## 2021-05-11 RX ADMIN — INSULIN LISPRO 3 UNITS: 100 INJECTION, SOLUTION INTRAVENOUS; SUBCUTANEOUS at 19:46

## 2021-05-11 RX ADMIN — FINASTERIDE 5 MG: 5 TABLET, FILM COATED ORAL at 05:05

## 2021-05-11 RX ADMIN — TAMSULOSIN HYDROCHLORIDE 0.4 MG: 0.4 CAPSULE ORAL at 05:05

## 2021-05-11 RX ADMIN — Medication 2 SPRAY: at 08:06

## 2021-05-11 RX ADMIN — AMLODIPINE BESYLATE 10 MG: 10 TABLET ORAL at 05:05

## 2021-05-11 RX ADMIN — SODIUM CHLORIDE, POTASSIUM CHLORIDE, SODIUM LACTATE AND CALCIUM CHLORIDE 100 ML/HR: 600; 310; 30; 20 INJECTION, SOLUTION INTRAVENOUS at 08:06

## 2021-05-11 RX ADMIN — LISINOPRIL 40 MG: 40 TABLET ORAL at 05:10

## 2021-05-11 RX ADMIN — Medication 2 SPRAY: at 20:21

## 2021-05-11 RX ADMIN — SODIUM CHLORIDE 1 G: 900 INJECTION INTRAVENOUS at 08:06

## 2021-05-11 RX ADMIN — ACETAMINOPHEN 650 MG: 325 TABLET ORAL at 16:04

## 2021-05-12 ENCOUNTER — READMISSION MANAGEMENT (OUTPATIENT)
Dept: CALL CENTER | Facility: HOSPITAL | Age: 86
End: 2021-05-12

## 2021-05-12 ENCOUNTER — TELEPHONE (OUTPATIENT)
Dept: INTERNAL MEDICINE | Facility: CLINIC | Age: 86
End: 2021-05-12

## 2021-05-12 VITALS
WEIGHT: 256 LBS | DIASTOLIC BLOOD PRESSURE: 81 MMHG | HEIGHT: 75 IN | RESPIRATION RATE: 18 BRPM | OXYGEN SATURATION: 93 % | SYSTOLIC BLOOD PRESSURE: 173 MMHG | TEMPERATURE: 98.2 F | BODY MASS INDEX: 31.83 KG/M2 | HEART RATE: 70 BPM

## 2021-05-12 LAB
ANION GAP SERPL CALCULATED.3IONS-SCNC: 8 MMOL/L (ref 5–15)
BACTERIA SPEC AEROBE CULT: ABNORMAL
BUN SERPL-MCNC: 7 MG/DL (ref 8–23)
BUN/CREAT SERPL: 11.5 (ref 7–25)
CALCIUM SPEC-SCNC: 8.9 MG/DL (ref 8.6–10.5)
CHLORIDE SERPL-SCNC: 101 MMOL/L (ref 98–107)
CO2 SERPL-SCNC: 31 MMOL/L (ref 22–29)
CREAT SERPL-MCNC: 0.61 MG/DL (ref 0.76–1.27)
GFR SERPL CREATININE-BSD FRML MDRD: 126 ML/MIN/1.73
GLUCOSE BLDC GLUCOMTR-MCNC: 140 MG/DL (ref 70–130)
GLUCOSE BLDC GLUCOMTR-MCNC: 156 MG/DL (ref 70–130)
GLUCOSE BLDC GLUCOMTR-MCNC: 168 MG/DL (ref 70–130)
GLUCOSE SERPL-MCNC: 163 MG/DL (ref 65–99)
HCT VFR BLD AUTO: 32.9 % (ref 37.5–51)
HCT VFR BLD AUTO: 36.8 % (ref 37.5–51)
HGB BLD-MCNC: 10.4 G/DL (ref 13–17.7)
HGB BLD-MCNC: 11.6 G/DL (ref 13–17.7)
POTASSIUM SERPL-SCNC: 3.9 MMOL/L (ref 3.5–5.2)
SODIUM SERPL-SCNC: 140 MMOL/L (ref 136–145)

## 2021-05-12 PROCEDURE — 80048 BASIC METABOLIC PNL TOTAL CA: CPT | Performed by: INTERNAL MEDICINE

## 2021-05-12 PROCEDURE — 82962 GLUCOSE BLOOD TEST: CPT

## 2021-05-12 PROCEDURE — 25010000002 CEFTRIAXONE PER 250 MG: Performed by: HOSPITALIST

## 2021-05-12 PROCEDURE — 94799 UNLISTED PULMONARY SVC/PX: CPT

## 2021-05-12 PROCEDURE — 99238 HOSP IP/OBS DSCHRG MGMT 30/<: CPT | Performed by: INTERNAL MEDICINE

## 2021-05-12 PROCEDURE — 85018 HEMOGLOBIN: CPT | Performed by: HOSPITALIST

## 2021-05-12 PROCEDURE — 63710000001 INSULIN LISPRO (HUMAN) PER 5 UNITS: Performed by: HOSPITALIST

## 2021-05-12 PROCEDURE — 85014 HEMATOCRIT: CPT | Performed by: HOSPITALIST

## 2021-05-12 RX ORDER — CEPHALEXIN 500 MG/1
500 CAPSULE ORAL 2 TIMES DAILY
Qty: 6 CAPSULE | Refills: 0 | Status: SHIPPED | OUTPATIENT
Start: 2021-05-13 | End: 2021-06-01

## 2021-05-12 RX ADMIN — SODIUM CHLORIDE 1 G: 900 INJECTION INTRAVENOUS at 07:29

## 2021-05-12 RX ADMIN — Medication 2 SPRAY: at 07:30

## 2021-05-12 RX ADMIN — SODIUM CHLORIDE, PRESERVATIVE FREE 10 ML: 5 INJECTION INTRAVENOUS at 07:29

## 2021-05-12 RX ADMIN — LISINOPRIL 40 MG: 40 TABLET ORAL at 04:51

## 2021-05-12 RX ADMIN — INSULIN LISPRO 2 UNITS: 100 INJECTION, SOLUTION INTRAVENOUS; SUBCUTANEOUS at 11:51

## 2021-05-12 RX ADMIN — TAMSULOSIN HYDROCHLORIDE 0.4 MG: 0.4 CAPSULE ORAL at 04:51

## 2021-05-12 RX ADMIN — SODIUM CHLORIDE, POTASSIUM CHLORIDE, SODIUM LACTATE AND CALCIUM CHLORIDE 100 ML/HR: 600; 310; 30; 20 INJECTION, SOLUTION INTRAVENOUS at 07:28

## 2021-05-12 RX ADMIN — ALLOPURINOL 300 MG: 300 TABLET ORAL at 04:51

## 2021-05-12 RX ADMIN — PRAVASTATIN SODIUM 40 MG: 40 TABLET ORAL at 04:51

## 2021-05-12 RX ADMIN — PANTOPRAZOLE SODIUM 40 MG: 40 TABLET, DELAYED RELEASE ORAL at 04:51

## 2021-05-12 RX ADMIN — FERROUS SULFATE TAB 325 MG (65 MG ELEMENTAL FE) 325 MG: 325 (65 FE) TAB at 04:51

## 2021-05-12 RX ADMIN — FINASTERIDE 5 MG: 5 TABLET, FILM COATED ORAL at 04:51

## 2021-05-12 RX ADMIN — AMLODIPINE BESYLATE 10 MG: 10 TABLET ORAL at 04:51

## 2021-05-12 RX ADMIN — AMIODARONE HYDROCHLORIDE 200 MG: 200 TABLET ORAL at 04:50

## 2021-05-12 NOTE — TELEPHONE ENCOUNTER
ADMITTED ON 5/10    DISCHARG[facility] ON 5/12    [facility]    DIAGNOSIS: ACUTE LEFT FLANK PAIN

## 2021-05-12 NOTE — OUTREACH NOTE
Prep Survey      Responses   [facility] patient discharged from?  [place]   Is LACE score < 7 ?  No   Emergency Room discharge w/ pulse ox?  No   Eligibility  [facility]   Date of Admission  05/10/21   Date of Discharge  05/12/21   Discharge Disposition  Home or Self Care   Discharge diagnosis  Acute left flank pain   Does the patient have one of the following disease processes/diagnoses(primary or secondary)?  Other   Does the patient have Home health ordered?  No   Is there a DME ordered?  No   Prep survey completed?  Yes          Linda Nowak RN

## 2021-05-13 ENCOUNTER — TRANSITIONAL CARE MANAGEMENT TELEPHONE ENCOUNTER (OUTPATIENT)
Dept: CALL CENTER | Facility: HOSPITAL | Age: 86
End: 2021-05-13

## 2021-05-13 ENCOUNTER — OFFICE VISIT (OUTPATIENT)
Dept: INTERNAL MEDICINE | Facility: CLINIC | Age: 86
End: 2021-05-13

## 2021-05-13 VITALS
DIASTOLIC BLOOD PRESSURE: 76 MMHG | TEMPERATURE: 97.8 F | SYSTOLIC BLOOD PRESSURE: 132 MMHG | HEART RATE: 92 BPM | BODY MASS INDEX: 32 KG/M2 | RESPIRATION RATE: 18 BRPM | WEIGHT: 256 LBS

## 2021-05-13 DIAGNOSIS — R31.0 GROSS HEMATURIA: Primary | ICD-10-CM

## 2021-05-13 DIAGNOSIS — N30.01 ACUTE CYSTITIS WITH HEMATURIA: ICD-10-CM

## 2021-05-13 PROCEDURE — 85025 COMPLETE CBC W/AUTO DIFF WBC: CPT | Performed by: INTERNAL MEDICINE

## 2021-05-13 PROCEDURE — 1111F DSCHRG MED/CURRENT MED MERGE: CPT | Performed by: INTERNAL MEDICINE

## 2021-05-13 PROCEDURE — 99495 TRANSJ CARE MGMT MOD F2F 14D: CPT | Performed by: INTERNAL MEDICINE

## 2021-05-13 PROCEDURE — 80048 BASIC METABOLIC PNL TOTAL CA: CPT | Performed by: INTERNAL MEDICINE

## 2021-05-13 NOTE — OUTREACH NOTE
Call Center TCM Note      Responses   [facility] patient discharged from?  [place]   Does the patient have one of the following disease processes/diagnoses(primary or secondary)?  Other   TCM attempt successful?  No   Unsuccessful attempts  Attempt 1          Melida Welch RN    5/13/2021, 11:28 EDT

## 2021-05-13 NOTE — OUTREACH NOTE
Call Center TCM Note      Responses   [facility] patient discharged from?  [place]   Does the patient have one of the following disease processes/diagnoses(primary or secondary)?  Other   TCM attempt successful?  Yes   Call start time  1424   Call end time  1429   Discharge diagnosis  Acute left flank pain, gross hematuria   Is patient permission given to speak with other caregiver?  No   Medication alerts for this patient  Patient to hold eliquis until follow up with Dr Rios in one week. Also patient to hold methenamine while on antibiotic.    Meds reviewed with patient/caregiver?  Yes   Is the patient having any side effects they believe may be caused by any medication additions or changes?  No   Does the patient have all medications ordered at discharge?  Yes   Is the patient taking all medications as directed (includes completed medication regime)?  Yes   Does the patient have a primary care provider?   Yes   Does the patient have an appointment with their PCP within 7 days of discharge?  Yes   Comments regarding PCP  PCP Dr Pito [address]. Patient has hospital follow up appt today at 315pm with PCP.    Has the patient kept scheduled appointments due by today?  N/A   Has home health visited the patient within 72 hours of discharge?  N/A   Psychosocial issues?  No   Did the patient receive a copy of their discharge instructions?  Yes   Nursing interventions  Reviewed instructions with patient   What is the patient's perception of their health status since discharge?  Improving [Patient denies any blood in urine today. ]   Is the patient/caregiver able to teach back signs and symptoms related to disease process for when to call PCP?  Yes   Is the patient/caregiver able to teach back signs and symptoms related to disease process for when to call 911?  Yes   Is the patient/caregiver able to teach back the hierarchy of who to call/visit for symptoms/problems? PCP, Specialist, Home health nurse, Urgent  [facility], ED, 911  Yes   If the patient is a current smoker, are they able to teach back resources for cessation?  Not a smoker   TCM call completed?  Yes          Shiela Mishra RN    5/13/2021, 14:29 EDT

## 2021-05-14 LAB
ANION GAP SERPL CALCULATED.3IONS-SCNC: 10 MMOL/L (ref 5–15)
BASOPHILS # BLD AUTO: 0.05 10*3/MM3 (ref 0–0.2)
BASOPHILS NFR BLD AUTO: 0.6 % (ref 0–1.5)
BUN SERPL-MCNC: 12 MG/DL (ref 8–23)
BUN/CREAT SERPL: 14.8 (ref 7–25)
CALCIUM SPEC-SCNC: 8.8 MG/DL (ref 8.6–10.5)
CHLORIDE SERPL-SCNC: 101 MMOL/L (ref 98–107)
CO2 SERPL-SCNC: 30 MMOL/L (ref 22–29)
CREAT SERPL-MCNC: 0.81 MG/DL (ref 0.76–1.27)
DEPRECATED RDW RBC AUTO: 44.1 FL (ref 37–54)
EOSINOPHIL # BLD AUTO: 0.28 10*3/MM3 (ref 0–0.4)
EOSINOPHIL NFR BLD AUTO: 3.4 % (ref 0.3–6.2)
ERYTHROCYTE [DISTWIDTH] IN BLOOD BY AUTOMATED COUNT: 12.7 % (ref 12.3–15.4)
GFR SERPL CREATININE-BSD FRML MDRD: 91 ML/MIN/1.73
GLUCOSE SERPL-MCNC: 110 MG/DL (ref 65–99)
HCT VFR BLD AUTO: 33.3 % (ref 37.5–51)
HGB BLD-MCNC: 11 G/DL (ref 13–17.7)
IMM GRANULOCYTES # BLD AUTO: 0.09 10*3/MM3 (ref 0–0.05)
IMM GRANULOCYTES NFR BLD AUTO: 1.1 % (ref 0–0.5)
LYMPHOCYTES # BLD AUTO: 1.5 10*3/MM3 (ref 0.7–3.1)
LYMPHOCYTES NFR BLD AUTO: 18.3 % (ref 19.6–45.3)
MCH RBC QN AUTO: 31 PG (ref 26.6–33)
MCHC RBC AUTO-ENTMCNC: 33 G/DL (ref 31.5–35.7)
MCV RBC AUTO: 93.8 FL (ref 79–97)
MONOCYTES # BLD AUTO: 0.56 10*3/MM3 (ref 0.1–0.9)
MONOCYTES NFR BLD AUTO: 6.8 % (ref 5–12)
NEUTROPHILS NFR BLD AUTO: 5.7 10*3/MM3 (ref 1.7–7)
NEUTROPHILS NFR BLD AUTO: 69.8 % (ref 42.7–76)
NRBC BLD AUTO-RTO: 0 /100 WBC (ref 0–0.2)
PLATELET # BLD AUTO: 266 10*3/MM3 (ref 140–450)
PMV BLD AUTO: 9.9 FL (ref 6–12)
POTASSIUM SERPL-SCNC: 3.8 MMOL/L (ref 3.5–5.2)
RBC # BLD AUTO: 3.55 10*6/MM3 (ref 4.14–5.8)
SODIUM SERPL-SCNC: 141 MMOL/L (ref 136–145)
WBC # BLD AUTO: 8.18 10*3/MM3 (ref 3.4–10.8)

## 2021-05-24 ENCOUNTER — READMISSION MANAGEMENT (OUTPATIENT)
Dept: CALL CENTER | Facility: HOSPITAL | Age: 86
End: 2021-05-24

## 2021-05-24 NOTE — OUTREACH NOTE
Medical Week 2 Survey      Responses   [facility] patient discharged from?  [place]   Does the patient have one of the following disease processes/diagnoses(primary or secondary)?  Other   Week 2 attempt successful?  Yes   Call start time  1525   Discharge diagnosis  Acute left flank pain, gross hematuria   Call end time  1527   Meds reviewed with patient/caregiver?  Yes   Is the patient taking all medications as directed (includes completed medication regime)?  Yes   Medication comments  Finished antibx. Going back on eliquis   Has the patient kept scheduled appointments due by today?  Yes   Psychosocial issues?  No   What is the patient's perception of their health status since discharge?  Improving   Is the patient/caregiver able to teach back signs and symptoms related to disease process for when to call PCP?  Yes   Is the patient/caregiver able to teach back signs and symptoms related to disease process for when to call 911?  Yes   Is the patient/caregiver able to teach back the hierarchy of who to call/visit for symptoms/problems? PCP, Specialist, Home health nurse, Urgent Care, ED, 911  Yes   Additional teach back comments  No hematuria and doing very well   Week 2 Call Completed?  Yes          Lillain Leon RN

## 2021-05-24 NOTE — PROGRESS NOTES
Chief Complaint   Patient presents with   • Follow-up     4 MONTH FOLLOW UP CHRONIC MEDICAL PROBLEMS       History of Present Illness    The patient presents for a follow-up related to GERD. The patient is on omeprazole for his gastroesophageal reflux. The medication is taken on a regular basis and gives complete relief of the symptoms. He reports no abdominal pain, belching, chest pain, diarrhea, dysphagia, early satiety, heartburn, hoarseness, nausea, odynophagia, rectal bleeding, vomiting or weight loss. The GERD has no known aggravating factors.    The patient presents for a follow-up related to Type 2 Diabetes Mellitus. He does not check his blood sugars at home. He denies hypoglycemic symptoms. The patient denies polyuria, polydypsia or polyphagia. He reports no symptoms of neuropathy. The patient takes his medication as prescribed. He is not taking insulin. The patient does check his feet daily at home. He denies shortness of breath, orthopnea, paroxysmal nocturnal dyspnea, dyspnea on exertion, edema, palpitations or syncope.    The patient presents for a follow-up related to hypertension. The patient reports that he has had no headaches or blurred vision. He states that he is taking his medication as prescribed. He is not having medication side effects.    The patient presents for a follow-up related to hyperlipidemia. He is following a low fat diet. He reports that he is exercising. He is taking pravastatin. The patient is taking his medication as prescribed. He reports no medication side effects, including muscle cramps, abdominal pain, headaches or weakness.    Review of Systems    CONSTITUTIONAL- Denies Fever, Chills, Sweats, Fatigue, Weakness or Malaise.    HENT- Denies Nasal Discharge, Sore Throat, Ear Pain, Ear Drainage, Sinus Pain, Nasal Congestion, Decreased Hearing or Tinnitus.    GASTROINTESTINAL- Denies Constipation.    PULMONARY- Denies Wheezing, Sputum Production, Cough, Hemoptysis or Pleuritic  Chest Pain.    CARDIOVASCULAR- Denies Claudication or Irregular Heart Beat.    Medications      Current Outpatient Medications:   •  allopurinol (ZYLOPRIM) 300 MG tablet, Take 1 tablet by mouth once daily (Patient taking differently: Take 300 mg by mouth Daily.), Disp: 90 tablet, Rfl: 1  •  amLODIPine (NORVASC) 10 MG tablet, Take 1 tablet by mouth once daily (Patient taking differently: Take 10 mg by mouth Daily.), Disp: 90 tablet, Rfl: 1  •  Ascorbic Acid (VITAMIN C) 500 MG capsule, Take 1 tablet by mouth 4 (four) times a day., Disp: , Rfl:   •  docusate sodium (COLACE) 100 MG capsule, Take 300 mg by mouth every night., Disp: , Rfl:   •  Ferrous Gluconate (IRON) 240 (27 FE) MG tablet, Take 1 tablet by mouth daily., Disp: , Rfl:   •  finasteride (PROSCAR) 5 MG tablet, TAKE 1 TABLET BY MOUTH AT NIGHT (Patient taking differently: Take 5 mg by mouth Every Night.), Disp: 90 tablet, Rfl: 1  •  hydroCHLOROthiazide (MICROZIDE) 12.5 MG capsule, Take 1 capsule by mouth once daily in the morning (Patient taking differently: Take 12.5 mg by mouth Every Morning.), Disp: 90 capsule, Rfl: 1  •  lisinopril (PRINIVIL,ZESTRIL) 40 MG tablet, Take 1 tablet by mouth once daily (Patient taking differently: Take 40 mg by mouth Daily.), Disp: 90 tablet, Rfl: 2  •  metFORMIN (GLUCOPHAGE) 1000 MG tablet, Take 1 tablet by mouth twice daily (Patient taking differently: Take 1,000 mg by mouth 2 (Two) Times a Day With Meals.), Disp: 180 tablet, Rfl: 1  •  methenamine (HIPREX) 1 g tablet, TAKE 1/2 (ONE-HALF) TABLET BY MOUTH TWICE DAILY WITH A MEAL (Patient taking differently: Take 0.5 mg by mouth Daily.), Disp: 90 tablet, Rfl: 1  •  omeprazole (priLOSEC) 20 MG capsule, Take 1 capsule by mouth once daily (Patient taking differently: Take 20 mg by mouth Daily.), Disp: 90 capsule, Rfl: 0  •  pravastatin (PRAVACHOL) 40 MG tablet, Take 1 tablet by mouth once daily (Patient taking differently: Take 40 mg by mouth Daily.), Disp: 90 tablet, Rfl: 3  •   Probiotic Product (PROBIOTIC ADVANCED PO), Take 1 tablet by mouth 2 (Two) Times a Day., Disp: , Rfl:   •  tamsulosin (FLOMAX) 0.4 MG capsule 24 hr capsule, Take 1 capsule by mouth once daily (Patient taking differently: Take 0.4 mg by mouth Daily.), Disp: 90 capsule, Rfl: 0  •  amiodarone (PACERONE) 200 MG tablet, Take 1 tablet by mouth Daily. Indications: Atrial Fibrillation, Disp: 90 tablet, Rfl: 1  •  cephalexin (Keflex) 500 MG capsule, Take 1 capsule by mouth 2 (Two) Times a Day., Disp: 6 capsule, Rfl: 0     Allergies    Allergies   Allergen Reactions   • Penicillins Hives   • Xarelto [Rivaroxaban]      GI bleed       Problem List    Patient Active Problem List   Diagnosis   • Atopic rhinitis   • Benign (familial) paraproteinemia   • Type 2 diabetes mellitus, without long-term current use of insulin (CMS/HCC)   • Enlarged prostate without lower urinary tract symptoms (luts)   • Gastroesophageal reflux disease with esophagitis   • Polyp of gallbladder   • Gout   • Hematuria   • Liver cyst   • Hyperlipidemia LDL goal <100   • Essential hypertension   • Nephrolithiasis   • Obstructive sleep apnea syndrome   • Paroxysmal atrial fibrillation (CMS/HCC)   • Stage 3 chronic kidney disease (CMS/HCC)   • Atrial fibrillation with RVR (CMS/HCC)   • Long term current use of antiarrhythmic medical therapy   • Acute left flank pain   • Hydronephrosis       Medications, Allergies, Problems List and Past History were reviewed and updated.    Physical Examination    /74 (BP Location: Left arm, Patient Position: Sitting, Cuff Size: Adult)   Pulse 96   Temp 97.3 °F (36.3 °C) (Infrared)   Resp 20   Wt 117 kg (259 lb)   BMI 32.37 kg/m²     HEENT: Facies- Within normal limits. Lids- Normal bilaterally. Conjunctiva- Clear bilaterally. Sclera- Anicteric bilaterally.    Neck: Thyroid- non enlarged, symmetric and has no nodules. No bruits are detected.    Lungs: Auscultation- Clear to auscultation bilaterally. There are no  retractions, clubbing or cyanosis. The Expiratory to Inspiratory ratio is equal.    Cardiovascular: There are no carotid bruits. Heart- Normal Rate with Regular rhythm and no murmurs. There are no gallops. There are no rubs. In the lower extremities there is no edema. The upper extremities do not have edema.    Abdomen: Soft, benign, non-tender with no masses, hernias, organomegaly or scars.    Impression and Assessment    Gastroesophageal Reflux Disease.    Type 2 Diabetes Mellitus.    Essential Hypertension.    Hyperlipidemia.    Plan    Gastroesophageal Reflux Disease Plan: The patient was instructed to continue the current medications.    Essential Hypertension Plan: The patient was instructed to continue the current medications.    Hyperlipidemia Plan: The patient was instructed to exercise daily, eat a low fat diet and continue his medications.    Type 2 Diabetes Mellitus Plan: The patient was instructed to continue the current medications.    Diagnoses and all orders for this visit:    1. Type 2 diabetes mellitus without complication, without long-term current use of insulin (CMS/[facility]) (Primary)  -     Hemoglobin A1c    2. Gastroesophageal reflux disease with esophagitis without hemorrhage    3. Essential hypertension    4. Hyperlipidemia, unspecified hyperlipidemia type  -     Lipid Panel        Return to Office    The patient was instructed to return for follow-up in 4 months. The patient was instructed to return sooner if the condition changes, worsens, or does not resolve.

## 2021-05-31 RX ORDER — TAMSULOSIN HYDROCHLORIDE 0.4 MG/1
CAPSULE ORAL
Qty: 90 CAPSULE | Refills: 0 | Status: SHIPPED | OUTPATIENT
Start: 2021-05-31 | End: 2021-08-16

## 2021-05-31 NOTE — PROGRESS NOTES
Chief Complaint   Patient presents with   • Hospital Follow Up Visit     [facility] 5/10-5/12 Hematuria       History of Present Illness    The patient presents to the office for a follow-up visit from a recent hospitalization. The patient was hospitalized from 10-May-2021 through 12-May-2021 with a urinary tract infection, hematuria and hydroureteronephrosis. The records have been received and reviewed. The medication list has been reconciled. The hospital stay was uncomplicated. His in hospital treatment consisted of oral antibiotics, adjustments in the patients medication regimen, monitoring and continuous bladder irrigation.  Consultations were obtained from Urology.       Since leaving the hospital he reports that he is improved. He denies abdominal pain, bone pain, chills, congestion, a dry cough, a wet cough, decreased appetite, diarrhea, dysuria, facial pain, fever, a headache, nausea, night sweats, a rash, a sore throat, vomiting or wheezing. He also reports that he does not have chest pain, dyspnea, edema, syncope, blurred vision or palpitations.    The patient presents for follow-up of a urinary tract infection. The patient denies gross hematuria, frequency, urgency, hesitancy, fever, vaginal discharge, pelvic pain or flank pain. He completed his medication. He has not had a prior history of frequent, recurrent urinary tract infections.    Review of Systems    CONSTITUTIONAL- Denies Unexplained Weight Loss, Sweats, Fatigue, Weakness or Malaise.    HENT- Denies Sinus Pain, Decreased Hearing or Tinnitus.    GASTROINTESTINAL- Denies Blood per Rectum, Constipation or Heartburn.    PULMONARY- Denies Sputum Production, Cough, Hemoptysis or Pleuritic Chest Pain.    CARDIOVASCULAR- Denies Claudication or Irregular Heart Beat.    Medications      Current Outpatient Medications:   •  allopurinol (ZYLOPRIM) 300 MG tablet, Take 1 tablet by mouth once daily (Patient taking differently: Take 300 mg by mouth Daily.), Disp: 90  tablet, Rfl: 1  •  amiodarone (PACERONE) 200 MG tablet, Take 1 tablet by mouth Daily. Indications: Atrial Fibrillation, Disp: 90 tablet, Rfl: 1  •  amLODIPine (NORVASC) 10 MG tablet, Take 1 tablet by mouth once daily (Patient taking differently: Take 10 mg by mouth Daily.), Disp: 90 tablet, Rfl: 1  •  Ascorbic Acid (VITAMIN C) 500 MG capsule, Take 1 tablet by mouth 4 (four) times a day., Disp: , Rfl:   •  cephalexin (Keflex) 500 MG capsule, Take 1 capsule by mouth 2 (Two) Times a Day., Disp: 6 capsule, Rfl: 0  •  docusate sodium (COLACE) 100 MG capsule, Take 300 mg by mouth every night., Disp: , Rfl:   •  Ferrous Gluconate (IRON) 240 (27 FE) MG tablet, Take 1 tablet by mouth daily., Disp: , Rfl:   •  finasteride (PROSCAR) 5 MG tablet, TAKE 1 TABLET BY MOUTH AT NIGHT (Patient taking differently: Take 5 mg by mouth Every Night.), Disp: 90 tablet, Rfl: 1  •  hydroCHLOROthiazide (MICROZIDE) 12.5 MG capsule, Take 1 capsule by mouth once daily in the morning (Patient taking differently: Take 12.5 mg by mouth Every Morning.), Disp: 90 capsule, Rfl: 1  •  lisinopril (PRINIVIL,ZESTRIL) 40 MG tablet, Take 1 tablet by mouth once daily (Patient taking differently: Take 40 mg by mouth Daily.), Disp: 90 tablet, Rfl: 2  •  metFORMIN (GLUCOPHAGE) 1000 MG tablet, Take 1 tablet by mouth twice daily (Patient taking differently: Take 1,000 mg by mouth 2 (Two) Times a Day With Meals.), Disp: 180 tablet, Rfl: 1  •  omeprazole (priLOSEC) 20 MG capsule, Take 1 capsule by mouth once daily (Patient taking differently: Take 20 mg by mouth Daily.), Disp: 90 capsule, Rfl: 0  •  pravastatin (PRAVACHOL) 40 MG tablet, Take 1 tablet by mouth once daily (Patient taking differently: Take 40 mg by mouth Daily.), Disp: 90 tablet, Rfl: 3  •  Probiotic Product (PROBIOTIC ADVANCED PO), Take 1 tablet by mouth 2 (Two) Times a Day., Disp: , Rfl:   •  tamsulosin (FLOMAX) 0.4 MG capsule 24 hr capsule, Take 1 capsule by mouth once daily (Patient taking  differently: Take 0.4 mg by mouth Daily.), Disp: 90 capsule, Rfl: 0  •  methenamine (HIPREX) 1 g tablet, TAKE 1/2 (ONE-HALF) TABLET BY MOUTH TWICE DAILY WITH A MEAL (Patient taking differently: Take 0.5 mg by mouth Daily.), Disp: 90 tablet, Rfl: 1   Current outpatient and discharge medications have been reconciled for the patient.  Reviewed by: Pito Way MD    Allergies    Allergies   Allergen Reactions   • Penicillins Hives   • Xarelto [Rivaroxaban]      GI bleed       Problem List    Patient Active Problem List   Diagnosis   • Atopic rhinitis   • Benign (familial) paraproteinemia   • Type 2 diabetes mellitus, without long-term current use of insulin (CMS/HCC)   • Enlarged prostate without lower urinary tract symptoms (luts)   • Gastroesophageal reflux disease with esophagitis   • Polyp of gallbladder   • Gout   • Hematuria   • Liver cyst   • Hyperlipidemia LDL goal <100   • Essential hypertension   • Nephrolithiasis   • Obstructive sleep apnea syndrome   • Paroxysmal atrial fibrillation (CMS/HCC)   • Stage 3 chronic kidney disease (CMS/HCC)   • Atrial fibrillation with RVR (CMS/HCC)   • Long term current use of antiarrhythmic medical therapy   • Acute left flank pain   • Hydronephrosis       Medications, Allergies, Problems List and Past History were reviewed and updated.    Physical Examination    /76 (BP Location: Left arm, Patient Position: Sitting, Cuff Size: Adult)   Pulse 92   Temp 97.8 °F (36.6 °C) (Infrared)   Resp 18   Wt 116 kg (256 lb)   BMI 32.00 kg/m²       HEENT: Head- Normocephalic Atraumatic. Facies- Within normal limits. Pinnas- Normal texture and shape bilaterally. Canals- Normal bilaterally. TMs- Normal bilaterally. Nares- Patent bilaterally. Nasal Septum- is normal. There is no tenderness to palpation over the frontal or maxillary sinuses. Lids- Normal bilaterally. Conjunctiva- Clear bilaterally. Sclera- Anicteric bilaterally. Oropharynx- Moist with no lesions. Tonsils- No  enlargement, erythema or exudate.    Neck: Thyroid- non enlarged, symmetric and has no nodules. No bruits are detected. ROM- Normal Range of Motion with no rigidity.    Lungs: Auscultation- Clear to auscultation bilaterally. There are no retractions, clubbing or cyanosis. The Expiratory to Inspiratory ratio is equal.    Lymph Nodes: Cervical- no enlarged lymph nodes noted.    Cardiovascular: There are no carotid bruits. Heart- Normal Rate with Regular rhythm and no murmurs. There are no gallops. There are no rubs. In the lower extremities there is no edema. The upper extremities do not have edema.    Abdomen: Soft, benign, non-tender with no masses, hernias, organomegaly or scars.    Impression and Assessment    Cystitis.    Plan    Cystitis Plan: He will follow-up with urology. The patient was instructed to continue the current medications.    Diagnoses and all orders for this visit:    1. Gross hematuria (Primary)  -     CBC & Differential  -     Basic Metabolic Panel    2. Acute cystitis with hematuria  -     CBC & Differential  -     Basic Metabolic Panel    Transitional Care Management Certification  I certify that the following are true:  1. Communication was made within 2 business days of discharge.  2. Complexity of Medical Decision Making is moderate.  3. Face to face visit occurred within 3 days.    *Note: 13615 is for high complexity patients with a face to face visit within 7 days of discharge.  35939 is for high complexity patients with a face to face on days 8-14 post discharge or medium complexity with face to face visit within 14 days post discharge.        Return to Office    The patient was instructed to return for follow-up as needed. The patient was instructed to return sooner if the condition changes, worsens, or does not resolve.

## 2021-06-01 ENCOUNTER — OFFICE VISIT (OUTPATIENT)
Dept: CARDIOLOGY | Facility: CLINIC | Age: 86
End: 2021-06-01

## 2021-06-01 VITALS
HEIGHT: 75 IN | HEART RATE: 78 BPM | WEIGHT: 252 LBS | DIASTOLIC BLOOD PRESSURE: 60 MMHG | SYSTOLIC BLOOD PRESSURE: 110 MMHG | BODY MASS INDEX: 31.33 KG/M2 | OXYGEN SATURATION: 96 %

## 2021-06-01 DIAGNOSIS — E78.5 HYPERLIPIDEMIA LDL GOAL <100: ICD-10-CM

## 2021-06-01 DIAGNOSIS — Z79.899 LONG TERM CURRENT USE OF ANTIARRHYTHMIC MEDICAL THERAPY: ICD-10-CM

## 2021-06-01 DIAGNOSIS — I48.0 PAROXYSMAL ATRIAL FIBRILLATION (HCC): Primary | Chronic | ICD-10-CM

## 2021-06-01 DIAGNOSIS — I10 ESSENTIAL HYPERTENSION: ICD-10-CM

## 2021-06-01 PROCEDURE — 93000 ELECTROCARDIOGRAM COMPLETE: CPT | Performed by: NURSE PRACTITIONER

## 2021-06-01 PROCEDURE — 99214 OFFICE O/P EST MOD 30 MIN: CPT | Performed by: NURSE PRACTITIONER

## 2021-06-01 NOTE — ASSESSMENT & PLAN NOTE
· Hypertension is controlled  · Continue him loaded pain 10 mg daily  · Continue hydrochlorothiazide 12.5 mg daily  · Continue lisinopril 40 mg daily

## 2021-06-02 ENCOUNTER — READMISSION MANAGEMENT (OUTPATIENT)
Dept: CALL CENTER | Facility: HOSPITAL | Age: 86
End: 2021-06-02

## 2021-06-05 DIAGNOSIS — I10 ESSENTIAL HYPERTENSION: Chronic | ICD-10-CM

## 2021-06-07 RX ORDER — LISINOPRIL 40 MG/1
40 TABLET ORAL DAILY
Qty: 90 TABLET | Refills: 1 | Status: SHIPPED | OUTPATIENT
Start: 2021-06-07 | End: 2021-10-25

## 2021-06-07 RX ORDER — OMEPRAZOLE 20 MG/1
CAPSULE, DELAYED RELEASE ORAL
Qty: 90 CAPSULE | Refills: 1 | Status: SHIPPED | OUTPATIENT
Start: 2021-06-07 | End: 2021-10-25

## 2021-06-30 LAB
QT INTERVAL: 384 MS
QTC INTERVAL: 453 MS

## 2021-08-16 ENCOUNTER — OFFICE VISIT (OUTPATIENT)
Dept: INTERNAL MEDICINE | Facility: CLINIC | Age: 86
End: 2021-08-16

## 2021-08-16 ENCOUNTER — LAB (OUTPATIENT)
Dept: LAB | Facility: HOSPITAL | Age: 86
End: 2021-08-16

## 2021-08-16 VITALS
SYSTOLIC BLOOD PRESSURE: 128 MMHG | WEIGHT: 254 LBS | DIASTOLIC BLOOD PRESSURE: 62 MMHG | HEART RATE: 84 BPM | RESPIRATION RATE: 18 BRPM | BODY MASS INDEX: 31.75 KG/M2 | TEMPERATURE: 97.7 F

## 2021-08-16 DIAGNOSIS — E78.5 HYPERLIPIDEMIA, UNSPECIFIED HYPERLIPIDEMIA TYPE: ICD-10-CM

## 2021-08-16 DIAGNOSIS — E11.9 TYPE 2 DIABETES MELLITUS WITHOUT COMPLICATION, WITHOUT LONG-TERM CURRENT USE OF INSULIN (HCC): Primary | ICD-10-CM

## 2021-08-16 DIAGNOSIS — I10 ESSENTIAL HYPERTENSION: ICD-10-CM

## 2021-08-16 DIAGNOSIS — I48.0 PAROXYSMAL ATRIAL FIBRILLATION (HCC): ICD-10-CM

## 2021-08-16 DIAGNOSIS — K21.00 GASTROESOPHAGEAL REFLUX DISEASE WITH ESOPHAGITIS WITHOUT HEMORRHAGE: ICD-10-CM

## 2021-08-16 LAB
ALBUMIN SERPL-MCNC: 3.8 G/DL (ref 3.5–5.2)
ALBUMIN/GLOB SERPL: 1.1 G/DL
ALP SERPL-CCNC: 81 U/L (ref 39–117)
ALT SERPL W P-5'-P-CCNC: 20 U/L (ref 1–41)
ANION GAP SERPL CALCULATED.3IONS-SCNC: 7.2 MMOL/L (ref 5–15)
AST SERPL-CCNC: 17 U/L (ref 1–40)
BILIRUB SERPL-MCNC: 0.4 MG/DL (ref 0–1.2)
BUN SERPL-MCNC: 21 MG/DL (ref 8–23)
BUN/CREAT SERPL: 23.1 (ref 7–25)
CALCIUM SPEC-SCNC: 8.6 MG/DL (ref 8.6–10.5)
CHLORIDE SERPL-SCNC: 104 MMOL/L (ref 98–107)
CHOLEST SERPL-MCNC: 94 MG/DL (ref 0–200)
CO2 SERPL-SCNC: 28.8 MMOL/L (ref 22–29)
CREAT SERPL-MCNC: 0.91 MG/DL (ref 0.76–1.27)
GFR SERPL CREATININE-BSD FRML MDRD: 79 ML/MIN/1.73
GLOBULIN UR ELPH-MCNC: 3.5 GM/DL
GLUCOSE SERPL-MCNC: 112 MG/DL (ref 65–99)
HBA1C MFR BLD: 6 % (ref 4.8–5.6)
HDLC SERPL-MCNC: 28 MG/DL (ref 40–60)
LDLC SERPL CALC-MCNC: 36 MG/DL (ref 0–100)
LDLC/HDLC SERPL: 1.06 {RATIO}
POTASSIUM SERPL-SCNC: 4 MMOL/L (ref 3.5–5.2)
PROT SERPL-MCNC: 7.3 G/DL (ref 6–8.5)
SODIUM SERPL-SCNC: 140 MMOL/L (ref 136–145)
TRIGL SERPL-MCNC: 182 MG/DL (ref 0–150)
VLDLC SERPL-MCNC: 30 MG/DL (ref 5–40)

## 2021-08-16 PROCEDURE — 80061 LIPID PANEL: CPT | Performed by: INTERNAL MEDICINE

## 2021-08-16 PROCEDURE — 80053 COMPREHEN METABOLIC PANEL: CPT | Performed by: INTERNAL MEDICINE

## 2021-08-16 PROCEDURE — 36415 COLL VENOUS BLD VENIPUNCTURE: CPT | Performed by: INTERNAL MEDICINE

## 2021-08-16 PROCEDURE — 99214 OFFICE O/P EST MOD 30 MIN: CPT | Performed by: INTERNAL MEDICINE

## 2021-08-16 PROCEDURE — 83036 HEMOGLOBIN GLYCOSYLATED A1C: CPT | Performed by: INTERNAL MEDICINE

## 2021-08-16 RX ORDER — TAMSULOSIN HYDROCHLORIDE 0.4 MG/1
CAPSULE ORAL
Qty: 90 CAPSULE | Refills: 0 | Status: SHIPPED | OUTPATIENT
Start: 2021-08-16 | End: 2021-10-25

## 2021-08-16 NOTE — PROGRESS NOTES
Chief Complaint   Patient presents with   • Follow-up     4 MONTH FOLLOW UP CHRONIC MEDICAL PROBLEMS       History of Present Illness      The patient presents for a follow-up related to Type 2 Diabetes Mellitus. He does not check his blood sugars at home. He denies hypoglycemic symptoms. The patient denies polyuria, polydypsia or polyphagia. He reports no symptoms of neuropathy. The patient takes his medication as prescribed. He is not taking insulin. The patient does check his feet daily at home. He denies chest pain, shortness of breath, orthopnea, paroxysmal nocturnal dyspnea, dyspnea on exertion, edema, palpitations or syncope.    The patient presents for a follow-up related to GERD. The patient is on omeprazole for his gastroesophageal reflux. The medication is taken on a regular basis and gives complete relief of the symptoms. He reports no abdominal pain, belching, diarrhea, dysphagia, early satiety, heartburn, hoarseness, nausea, odynophagia, rectal bleeding, vomiting or weight loss. The GERD has no known aggravating factors.    The patient presents for a follow-up related to hyperlipidemia. He is following a low fat diet. He reports that he is exercising. He is taking pravastatin. The patient is taking his medication as prescribed. He reports no medication side effects, including muscle cramps, abdominal pain, headaches or weakness.    The patient presents for a follow-up related to hypertension. The patient reports that he has had no headaches or blurred vision. He states that he is taking his medication as prescribed. He is not having medication side effects.    The patient presents for a follow-up related to chronic atrial fibrillation. He denies palpitations. The patient denies fatigue, dizziness or exercise intolerance. The patient denies using diet pills, decongestants, alcohol, caffeine, cocaine or stimulant medications.    Review of Systems    CONSTITUTIONAL- Denies Fever, Chills, Sweats, Weakness  or Malaise.    HENT- Denies Nasal Discharge, Sore Throat, Ear Pain, Ear Drainage, Sinus Pain, Nasal Congestion, Decreased Hearing or Tinnitus.    GASTROINTESTINAL- Denies Constipation.    PULMONARY- Denies Wheezing, Sputum Production, Cough, Hemoptysis or Pleuritic Chest Pain.    CARDIOVASCULAR- Denies Claudication or Irregular Heart Beat.    Medications      Current Outpatient Medications:   •  allopurinol (ZYLOPRIM) 300 MG tablet, Take 1 tablet by mouth once daily (Patient taking differently: Take 300 mg by mouth Daily.), Disp: 90 tablet, Rfl: 1  •  amiodarone (PACERONE) 200 MG tablet, Take 1 tablet by mouth Daily. Indications: Atrial Fibrillation, Disp: 90 tablet, Rfl: 1  •  amLODIPine (NORVASC) 10 MG tablet, Take 1 tablet by mouth once daily (Patient taking differently: Take 10 mg by mouth Daily.), Disp: 90 tablet, Rfl: 1  •  apixaban (ELIQUIS) 5 MG tablet tablet, Take 1 tablet by mouth Every 12 (Twelve) Hours for 360 days., Disp: 180 tablet, Rfl: 3  •  Ascorbic Acid (VITAMIN C) 500 MG capsule, Take 1 tablet by mouth 4 (four) times a day., Disp: , Rfl:   •  docusate sodium (COLACE) 100 MG capsule, Take 300 mg by mouth every night., Disp: , Rfl:   •  Ferrous Gluconate (IRON) 240 (27 FE) MG tablet, Take 1 tablet by mouth daily., Disp: , Rfl:   •  finasteride (PROSCAR) 5 MG tablet, TAKE 1 TABLET BY MOUTH AT NIGHT (Patient taking differently: Take 5 mg by mouth Every Night.), Disp: 90 tablet, Rfl: 1  •  hydroCHLOROthiazide (MICROZIDE) 12.5 MG capsule, Take 1 capsule by mouth once daily in the morning (Patient taking differently: Take 12.5 mg by mouth Every Morning.), Disp: 90 capsule, Rfl: 1  •  lisinopril (PRINIVIL,ZESTRIL) 40 MG tablet, Take 1 tablet by mouth Daily., Disp: 90 tablet, Rfl: 1  •  metFORMIN (GLUCOPHAGE) 1000 MG tablet, Take 1 tablet by mouth twice daily (Patient taking differently: Take 1,000 mg by mouth 2 (Two) Times a Day With Meals.), Disp: 180 tablet, Rfl: 1  •  methenamine (HIPREX) 1 g tablet,  TAKE 1/2 (ONE-HALF) TABLET BY MOUTH TWICE DAILY WITH A MEAL (Patient taking differently: Take 0.5 mg by mouth Daily.), Disp: 90 tablet, Rfl: 1  •  omeprazole (priLOSEC) 20 MG capsule, Take 1 capsule by mouth once daily, Disp: 90 capsule, Rfl: 1  •  pravastatin (PRAVACHOL) 40 MG tablet, Take 1 tablet by mouth once daily (Patient taking differently: Take 40 mg by mouth Daily.), Disp: 90 tablet, Rfl: 3  •  Probiotic Product (PROBIOTIC ADVANCED PO), Take 1 tablet by mouth 2 (Two) Times a Day., Disp: , Rfl:   •  tamsulosin (FLOMAX) 0.4 MG capsule 24 hr capsule, Take 1 capsule by mouth once daily, Disp: 90 capsule, Rfl: 0     Allergies    Allergies   Allergen Reactions   • Penicillins Hives   • Xarelto [Rivaroxaban]      GI bleed       Problem List    Patient Active Problem List   Diagnosis   • Atopic rhinitis   • Benign (familial) paraproteinemia   • Type 2 diabetes mellitus, without long-term current use of insulin (CMS/HCC)   • Enlarged prostate without lower urinary tract symptoms (luts)   • Gastroesophageal reflux disease with esophagitis   • Polyp of gallbladder   • Gout   • Hematuria   • Liver cyst   • Hyperlipidemia LDL goal <100   • Essential hypertension   • Nephrolithiasis   • Obstructive sleep apnea syndrome   • Paroxysmal atrial fibrillation (CMS/HCC)   • Stage 3 chronic kidney disease (CMS/HCC)   • Atrial fibrillation with RVR (CMS/HCC)   • Long term current use of antiarrhythmic medical therapy   • Acute left flank pain   • Hydronephrosis       Medications, Allergies, Problems List and Past History were reviewed and updated.    Physical Examination    /62 (BP Location: Left arm, Patient Position: Sitting, Cuff Size: Adult)   Pulse 84   Temp 97.7 °F (36.5 °C) (Infrared)   Resp 18   Wt 115 kg (254 lb)   BMI 31.75 kg/m²     HEENT: Facies- Within normal limits. Lids- Normal bilaterally. Conjunctiva- Clear bilaterally. Sclera- Anicteric bilaterally.    Neck: Thyroid- non enlarged, symmetric and has no  nodules. No bruits are detected.    Lungs: Auscultation- Clear to auscultation bilaterally. There are no retractions, clubbing or cyanosis. The Expiratory to Inspiratory ratio is equal.    Cardiovascular: There are no carotid bruits. Heart- Normal Rate with Irregularly Irregular rhythm and no murmurs. There are no gallops. There are no rubs. In the lower extremities there is no edema. The upper extremities do not have edema.    Abdomen: Soft, benign, non-tender with no masses, hernias, organomegaly or scars.    Impression and Assessment    Type 2 Diabetes Mellitus without complication without insulin usage.    Gastroesophageal Reflux Disease.    Hyperlipidemia.    Essential Hypertension.    Atrial Fibrillation.    Plan    Gastroesophageal Reflux Disease Plan: The patient was instructed to continue the current medications.    Hyperlipidemia Plan: The patient was instructed to exercise daily, eat a low fat diet and continue his medications.    Essential Hypertension Plan: The patient was instructed to continue the current medications.    Atrial Fibrillation Plan: The patient was instructed to continue the current medications.    Type 2 Diabetes Mellitus without complication without insulin usage Plan: A low fat diet was recommended. He should engage in aerobic exercise daily. Weight loss was recommended. The patient was instructed to continue the current medications.    Diagnoses and all orders for this visit:    1. Type 2 diabetes mellitus without complication, without long-term current use of insulin (CMS/HCC) (Primary)  -     Comprehensive Metabolic Panel  -     Hemoglobin A1c    2. Gastroesophageal reflux disease with esophagitis without hemorrhage    3. Essential hypertension  -     Comprehensive Metabolic Panel    4. Hyperlipidemia, unspecified hyperlipidemia type  -     Comprehensive Metabolic Panel  -     Lipid Panel    5. Paroxysmal atrial fibrillation (CMS/HCC)          Return to Office    The patient was  instructed to return for follow-up in 4 months. The patient was instructed to return sooner if the condition changes, worsens, or does not resolve.

## 2021-09-11 DIAGNOSIS — I10 ESSENTIAL HYPERTENSION: Chronic | ICD-10-CM

## 2021-09-13 RX ORDER — AMLODIPINE BESYLATE 10 MG/1
10 TABLET ORAL DAILY
Qty: 90 TABLET | Refills: 1 | Status: SHIPPED | OUTPATIENT
Start: 2021-09-13 | End: 2022-03-14

## 2021-09-13 RX ORDER — FINASTERIDE 5 MG/1
TABLET, FILM COATED ORAL
Qty: 90 TABLET | Refills: 1 | Status: SHIPPED | OUTPATIENT
Start: 2021-09-13 | End: 2022-03-28

## 2021-09-25 DIAGNOSIS — I10 ESSENTIAL HYPERTENSION: Chronic | ICD-10-CM

## 2021-09-25 DIAGNOSIS — I48.0 PAROXYSMAL ATRIAL FIBRILLATION (HCC): ICD-10-CM

## 2021-09-27 RX ORDER — HYDROCHLOROTHIAZIDE 12.5 MG/1
12.5 CAPSULE, GELATIN COATED ORAL EVERY MORNING
Qty: 90 CAPSULE | Refills: 0 | Status: SHIPPED | OUTPATIENT
Start: 2021-09-27 | End: 2021-12-13

## 2021-09-27 RX ORDER — AMIODARONE HYDROCHLORIDE 200 MG/1
TABLET ORAL
Qty: 90 TABLET | Refills: 0 | Status: SHIPPED | OUTPATIENT
Start: 2021-09-27 | End: 2021-10-25

## 2021-10-09 RX ORDER — ALLOPURINOL 300 MG/1
TABLET ORAL
Qty: 90 TABLET | Refills: 0 | Status: SHIPPED | OUTPATIENT
Start: 2021-10-09 | End: 2021-10-25

## 2021-10-23 DIAGNOSIS — I48.0 PAROXYSMAL ATRIAL FIBRILLATION (HCC): ICD-10-CM

## 2021-10-25 DIAGNOSIS — I10 ESSENTIAL HYPERTENSION: Chronic | ICD-10-CM

## 2021-10-25 RX ORDER — AMIODARONE HYDROCHLORIDE 200 MG/1
TABLET ORAL
Qty: 90 TABLET | Refills: 1 | Status: SHIPPED | OUTPATIENT
Start: 2021-10-25 | End: 2022-01-03

## 2021-10-25 RX ORDER — METHENAMINE HIPPURATE 1000 MG/1
TABLET ORAL
Qty: 90 TABLET | Refills: 0 | Status: SHIPPED | OUTPATIENT
Start: 2021-10-25 | End: 2022-01-17

## 2021-10-25 RX ORDER — TAMSULOSIN HYDROCHLORIDE 0.4 MG/1
CAPSULE ORAL
Qty: 90 CAPSULE | Refills: 0 | Status: SHIPPED | OUTPATIENT
Start: 2021-10-25 | End: 2022-02-14

## 2021-10-25 RX ORDER — LISINOPRIL 40 MG/1
TABLET ORAL
Qty: 90 TABLET | Refills: 3 | Status: SHIPPED | OUTPATIENT
Start: 2021-10-25 | End: 2022-03-30 | Stop reason: SDUPTHER

## 2021-10-25 RX ORDER — OMEPRAZOLE 20 MG/1
CAPSULE, DELAYED RELEASE ORAL
Qty: 90 CAPSULE | Refills: 0 | Status: SHIPPED | OUTPATIENT
Start: 2021-10-25 | End: 2022-02-14

## 2021-10-25 RX ORDER — ALLOPURINOL 300 MG/1
TABLET ORAL
Qty: 90 TABLET | Refills: 0 | Status: SHIPPED | OUTPATIENT
Start: 2021-10-25 | End: 2022-03-28

## 2021-11-06 DIAGNOSIS — E78.5 HYPERLIPIDEMIA LDL GOAL <100: ICD-10-CM

## 2021-11-08 RX ORDER — PRAVASTATIN SODIUM 40 MG
TABLET ORAL
Qty: 90 TABLET | Refills: 3 | Status: SHIPPED | OUTPATIENT
Start: 2021-11-08 | End: 2022-03-30 | Stop reason: SDUPTHER

## 2021-11-17 ENCOUNTER — TELEPHONE (OUTPATIENT)
Dept: INTERNAL MEDICINE | Facility: CLINIC | Age: 86
End: 2021-11-17

## 2021-11-17 RX ORDER — METHYLPREDNISOLONE 4 MG/1
TABLET ORAL
Qty: 21 TABLET | Refills: 0 | Status: SHIPPED | OUTPATIENT
Start: 2021-11-17 | End: 2021-12-16

## 2021-11-17 RX ORDER — BENZONATATE 200 MG/1
200 CAPSULE ORAL 3 TIMES DAILY PRN
Qty: 30 CAPSULE | Refills: 0 | Status: SHIPPED | OUTPATIENT
Start: 2021-11-17 | End: 2021-12-16

## 2021-11-17 NOTE — TELEPHONE ENCOUNTER
"S/w pt, informed that Dr Dotson said \"I have called in a medrol dose pack and tessalon perls.    He should maintain hydration.  He should monitor his oxygen levels at home and if less than 93%, he should contact us and/or go to the ER.\"  Verb good understanding and agreement.  "

## 2021-11-17 NOTE — TELEPHONE ENCOUNTER
Caller: Darien Camarena    Relationship to patient: Self    Best call back number: [phone] (H)    Date of exposure: 11/16/21    Date of positive COVID19 test: 11/17/21    Date if possible COVID19 exposure: 11/16/21    COVID19 symptoms:   COUGH    Date of initial quarantine: 11/16/21    Additional information or concerns:   PATIENT STATES THAT HE TESTED POSITIVE FOR COVID 11/17/21 AND WANTED TO LET DR. KARIMI KNOW. PATIENT IS REQUESTING A COUGH MEDICATION TO AIDE HIM WITH THE PERSISTENT COUGH AS HE STATES THAT THE COUGH GETS WORST AT NIGHT.     What is the patients preferred pharmacy:     [facility] [address] [place] [address] [phone] [facility] [phone] FX    PATIENT HAS BEEN ADVISED TO PLEASE ALLOW 48 HOURS FOR OUR CLINICAL TEAM WILL FOLLOW UP ON THIS REQUEST.

## 2021-11-17 NOTE — TELEPHONE ENCOUNTER
I have called in a medrol dose pack and tessalon perls.    He should maintain hydration.  He should monitor his oxygen levels at home and if less than 93%, he should contact us and/or go to the ER.  Pito Way MD  12:05 EST  11/17/21

## 2021-12-11 DIAGNOSIS — I10 ESSENTIAL HYPERTENSION: Chronic | ICD-10-CM

## 2021-12-13 RX ORDER — HYDROCHLOROTHIAZIDE 12.5 MG/1
CAPSULE, GELATIN COATED ORAL
Qty: 90 CAPSULE | Refills: 1 | Status: SHIPPED | OUTPATIENT
Start: 2021-12-13 | End: 2022-03-30 | Stop reason: SDUPTHER

## 2021-12-16 ENCOUNTER — OFFICE VISIT (OUTPATIENT)
Dept: INTERNAL MEDICINE | Facility: CLINIC | Age: 86
End: 2021-12-16

## 2021-12-16 ENCOUNTER — LAB (OUTPATIENT)
Dept: LAB | Facility: HOSPITAL | Age: 86
End: 2021-12-16

## 2021-12-16 VITALS
BODY MASS INDEX: 31.75 KG/M2 | SYSTOLIC BLOOD PRESSURE: 134 MMHG | HEART RATE: 68 BPM | RESPIRATION RATE: 18 BRPM | TEMPERATURE: 98 F | DIASTOLIC BLOOD PRESSURE: 68 MMHG | WEIGHT: 254 LBS

## 2021-12-16 DIAGNOSIS — I10 ESSENTIAL HYPERTENSION: ICD-10-CM

## 2021-12-16 DIAGNOSIS — E11.9 TYPE 2 DIABETES MELLITUS WITHOUT COMPLICATION, WITHOUT LONG-TERM CURRENT USE OF INSULIN (HCC): Primary | ICD-10-CM

## 2021-12-16 DIAGNOSIS — E78.5 HYPERLIPIDEMIA, UNSPECIFIED HYPERLIPIDEMIA TYPE: ICD-10-CM

## 2021-12-16 DIAGNOSIS — E11.9 TYPE 2 DIABETES MELLITUS WITHOUT COMPLICATION, WITHOUT LONG-TERM CURRENT USE OF INSULIN (HCC): ICD-10-CM

## 2021-12-16 DIAGNOSIS — K21.00 GASTROESOPHAGEAL REFLUX DISEASE WITH ESOPHAGITIS WITHOUT HEMORRHAGE: ICD-10-CM

## 2021-12-16 LAB
ALBUMIN SERPL-MCNC: 3.7 G/DL (ref 3.5–5.2)
ALBUMIN/GLOB SERPL: 1.1 G/DL
ALP SERPL-CCNC: 80 U/L (ref 39–117)
ALT SERPL W P-5'-P-CCNC: 18 U/L (ref 1–41)
ANION GAP SERPL CALCULATED.3IONS-SCNC: 7.6 MMOL/L (ref 5–15)
AST SERPL-CCNC: 24 U/L (ref 1–40)
BILIRUB SERPL-MCNC: 0.6 MG/DL (ref 0–1.2)
BUN SERPL-MCNC: 23 MG/DL (ref 8–23)
BUN/CREAT SERPL: 25.3 (ref 7–25)
CALCIUM SPEC-SCNC: 9.1 MG/DL (ref 8.6–10.5)
CHLORIDE SERPL-SCNC: 104 MMOL/L (ref 98–107)
CHOLEST SERPL-MCNC: 107 MG/DL (ref 0–200)
CO2 SERPL-SCNC: 28.4 MMOL/L (ref 22–29)
CREAT SERPL-MCNC: 0.91 MG/DL (ref 0.76–1.27)
GFR SERPL CREATININE-BSD FRML MDRD: 79 ML/MIN/1.73
GLOBULIN UR ELPH-MCNC: 3.3 GM/DL
GLUCOSE SERPL-MCNC: 99 MG/DL (ref 65–99)
HDLC SERPL-MCNC: 37 MG/DL (ref 40–60)
LDLC SERPL CALC-MCNC: 54 MG/DL (ref 0–100)
LDLC/HDLC SERPL: 1.45 {RATIO}
POTASSIUM SERPL-SCNC: 4.1 MMOL/L (ref 3.5–5.2)
PROT SERPL-MCNC: 7 G/DL (ref 6–8.5)
SODIUM SERPL-SCNC: 140 MMOL/L (ref 136–145)
TRIGL SERPL-MCNC: 81 MG/DL (ref 0–150)
VLDLC SERPL-MCNC: 16 MG/DL (ref 5–40)

## 2021-12-16 PROCEDURE — 83036 HEMOGLOBIN GLYCOSYLATED A1C: CPT

## 2021-12-16 PROCEDURE — 99214 OFFICE O/P EST MOD 30 MIN: CPT | Performed by: INTERNAL MEDICINE

## 2021-12-16 PROCEDURE — 80053 COMPREHEN METABOLIC PANEL: CPT

## 2021-12-16 PROCEDURE — 36415 COLL VENOUS BLD VENIPUNCTURE: CPT | Performed by: INTERNAL MEDICINE

## 2021-12-16 PROCEDURE — 80061 LIPID PANEL: CPT

## 2021-12-16 NOTE — PROGRESS NOTES
Chief Complaint   Patient presents with   • Follow-up     4 month follow up chronic medical problems       History of Present Illness      The patient presents for a follow-up related to Type 2 Diabetes Mellitus. He checks his blood sugars at home. His sugars are checked daily. The average sugars are in the 100-150 range. He denies hypoglycemic symptoms. The patient denies polyuria, polydypsia or polyphagia. He reports no symptoms of neuropathy. The patient takes his medication as prescribed. He is not taking insulin. The patient does check his feet daily at home. He denies chest pain, shortness of breath, orthopnea, paroxysmal nocturnal dyspnea, dyspnea on exertion, edema, palpitations or syncope.    The patient presents for a follow-up related to GERD. The patient is on omeprazole for his gastroesophageal reflux. The medication is taken on a regular basis and gives complete relief of the symptoms. He reports no abdominal pain, belching, diarrhea, dysphagia, early satiety, heartburn, hoarseness, nausea, odynophagia, rectal bleeding, vomiting or weight loss. The GERD has no known aggravating factors.    The patient presents for a follow-up related to hypertension. The patient reports that he has had no headaches or blurred vision. He states that he is taking his medication as prescribed. He is not having medication side effects.    The patient presents for a follow-up related to hyperlipidemia. He is following a low fat diet. He reports that he is exercising. He is taking pravastatin. The patient is taking his medication as prescribed. He reports no medication side effects, including muscle cramps, abdominal pain, headaches or weakness.    Review of Systems    CONSTITUTIONAL- Denies Fever, Chills, Sweats, Fatigue, Weakness or Malaise.    HENT- Denies Nasal Discharge, Sore Throat, Ear Pain, Ear Drainage, Sinus Pain, Nasal Congestion, Decreased Hearing or Tinnitus.    GASTROINTESTINAL- Denies  Constipation.    PULMONARY- Denies Wheezing, Sputum Production, Cough, Hemoptysis or Pleuritic Chest Pain.    CARDIOVASCULAR- Denies Claudication or Irregular Heart Beat.    Medications      Current Outpatient Medications:   •  allopurinol (ZYLOPRIM) 300 MG tablet, Take 1 tablet by mouth once daily, Disp: 90 tablet, Rfl: 0  •  amLODIPine (NORVASC) 10 MG tablet, Take 1 tablet by mouth Daily., Disp: 90 tablet, Rfl: 1  •  apixaban (ELIQUIS) 5 MG tablet tablet, Take 1 tablet by mouth Every 12 (Twelve) Hours for 360 days., Disp: 180 tablet, Rfl: 3  •  Ascorbic Acid (VITAMIN C) 500 MG capsule, Take 1 tablet by mouth 4 (four) times a day., Disp: , Rfl:   •  docusate sodium (COLACE) 100 MG capsule, Take 300 mg by mouth every night., Disp: , Rfl:   •  Ferrous Gluconate (IRON) 240 (27 FE) MG tablet, Take 1 tablet by mouth daily., Disp: , Rfl:   •  finasteride (PROSCAR) 5 MG tablet, TAKE 1 TABLET BY MOUTH AT NIGHT, Disp: 90 tablet, Rfl: 1  •  hydroCHLOROthiazide (MICROZIDE) 12.5 MG capsule, Take 1 capsule by mouth once daily in the morning, Disp: 90 capsule, Rfl: 1  •  lisinopril (PRINIVIL,ZESTRIL) 40 MG tablet, TAKE ONE TABLET BY MOUTH DAILY, Disp: 90 tablet, Rfl: 3  •  metFORMIN (GLUCOPHAGE) 1000 MG tablet, Take 1 tablet by mouth twice daily, Disp: 180 tablet, Rfl: 0  •  methenamine (HIPREX) 1 g tablet, TAKE 1/2 (ONE-HALF) TABLET BY MOUTH TWICE DAILY WITH A MEAL, Disp: 90 tablet, Rfl: 0  •  omeprazole (priLOSEC) 20 MG capsule, Take 1 capsule by mouth once daily, Disp: 90 capsule, Rfl: 0  •  Pacerone 200 MG tablet, Take 1 tablet by mouth once daily, Disp: 90 tablet, Rfl: 1  •  pravastatin (PRAVACHOL) 40 MG tablet, Take 1 tablet by mouth once daily, Disp: 90 tablet, Rfl: 3  •  Probiotic Product (PROBIOTIC ADVANCED PO), Take 1 tablet by mouth 2 (Two) Times a Day., Disp: , Rfl:   •  tamsulosin (FLOMAX) 0.4 MG capsule 24 hr capsule, Take 1 capsule by mouth once daily, Disp: 90 capsule, Rfl: 0     Allergies    Allergies   Allergen  Reactions   • Penicillins Hives   • Xarelto [Rivaroxaban]      GI bleed       Problem List    Patient Active Problem List   Diagnosis   • Atopic rhinitis   • Benign (familial) paraproteinemia   • Type 2 diabetes mellitus, without long-term current use of insulin (HCC)   • Enlarged prostate without lower urinary tract symptoms (luts)   • Gastroesophageal reflux disease with esophagitis   • Polyp of gallbladder   • Gout   • Hematuria   • Liver cyst   • Hyperlipidemia LDL goal <100   • Essential hypertension   • Nephrolithiasis   • Obstructive sleep apnea syndrome   • Paroxysmal atrial fibrillation (HCC)   • Stage 3 chronic kidney disease (HCC)   • Atrial fibrillation with RVR (HCC)   • Long term current use of antiarrhythmic medical therapy   • Acute left flank pain   • Hydronephrosis       Medications, Allergies, Problems List and Past History were reviewed and updated.    Physical Examination    /68 (BP Location: Left arm, Patient Position: Sitting, Cuff Size: Adult)   Pulse 68   Temp 98 °F (36.7 °C) (Infrared)   Resp 18   Wt 115 kg (254 lb)   BMI 31.75 kg/m²     HEENT: Facies- Within normal limits. Lids- Normal bilaterally. Conjunctiva- Clear bilaterally. Sclera- Anicteric bilaterally.    Neck: Thyroid- non enlarged, symmetric and has no nodules. No bruits are detected. ROM- Normal Range of Motion with no rigidity.    Lungs: Auscultation- Clear to auscultation bilaterally. There are no retractions, clubbing or cyanosis. The Expiratory to Inspiratory ratio is equal.    Cardiovascular: There are no carotid bruits. Heart- Normal Rate with Regular rhythm and no murmurs. There are no gallops. There are no rubs. In the lower extremities there is no edema. The upper extremities do not have edema.    Abdomen: Soft, benign, non-tender with no masses, hernias, organomegaly or scars.    Impression and Assessment    Type 2 Diabetes Mellitus without complication without insulin usage.    Gastroesophageal Reflux  Disease.    Essential Hypertension.    Hyperlipidemia.    Plan    Gastroesophageal Reflux Disease Plan: The patient was instructed to continue the current medications.    Essential Hypertension Plan: The patient was instructed to continue the current medications.    Hyperlipidemia Plan: The patient was instructed to exercise daily, eat a low fat diet and continue his medications.    Type 2 Diabetes Mellitus without complication without insulin usage Plan: The patient was instructed to continue the current medications.    Diagnoses and all orders for this visit:    1. Type 2 diabetes mellitus without complication, without long-term current use of insulin (HCC) (Primary)  -     Comprehensive Metabolic Panel; Future  -     Hemoglobin A1c; Future    2. Gastroesophageal reflux disease with esophagitis without hemorrhage    3. Essential hypertension  -     Comprehensive Metabolic Panel; Future    4. Hyperlipidemia, unspecified hyperlipidemia type  -     Comprehensive Metabolic Panel; Future  -     Lipid Panel; Future        Return to Office    The patient was instructed to return for follow-up in 4 months. The patient was instructed to return sooner if the condition changes, worsens, or does not resolve.

## 2021-12-17 LAB — HBA1C MFR BLD: 6.22 % (ref 4.8–5.6)

## 2022-01-01 DIAGNOSIS — I48.0 PAROXYSMAL ATRIAL FIBRILLATION: ICD-10-CM

## 2022-01-03 RX ORDER — AMIODARONE HYDROCHLORIDE 200 MG/1
TABLET ORAL
Qty: 90 TABLET | Refills: 1 | Status: SHIPPED | OUTPATIENT
Start: 2022-01-03 | End: 2022-02-24 | Stop reason: SDUPTHER

## 2022-01-17 RX ORDER — METHENAMINE HIPPURATE 1000 MG/1
TABLET ORAL
Qty: 90 TABLET | Refills: 0 | Status: SHIPPED | OUTPATIENT
Start: 2022-01-17 | End: 2022-03-30 | Stop reason: SDUPTHER

## 2022-02-14 RX ORDER — TAMSULOSIN HYDROCHLORIDE 0.4 MG/1
CAPSULE ORAL
Qty: 90 CAPSULE | Refills: 0 | Status: SHIPPED | OUTPATIENT
Start: 2022-02-14 | End: 2022-03-30 | Stop reason: SDUPTHER

## 2022-02-14 RX ORDER — OMEPRAZOLE 20 MG/1
CAPSULE, DELAYED RELEASE ORAL
Qty: 90 CAPSULE | Refills: 0 | Status: SHIPPED | OUTPATIENT
Start: 2022-02-14 | End: 2022-03-30 | Stop reason: SDUPTHER

## 2022-02-23 NOTE — PROGRESS NOTES
"[facility]      Identification: Darien Camarena is a 86 y.o. male who resides in [place], [place]    Reason for visit:  Paroxysmal atrial fibrillation (CMS/HCC)      Subjective      Darien Camarena presents to [facility] for followup.    ENC returns to the office today.  He is doing well and denies any palpitations, heart failure symptoms, or angina.  He is tolerating amiodarone without side effect.  He is paying a lot out of pocket for his apixaban, however.            Review of Systems   Cardiovascular: Negative.    Respiratory: Negative.        Objective     /68 (BP Location: Right arm, Patient Position: Sitting)   Pulse 73   Ht 190.5 cm (75\")   Wt 117 kg (258 lb 12.8 oz)   SpO2 97%   BMI 32.35 kg/m²       Constitutional:       Appearance: Healthy appearance. Well-developed.   Eyes:      General: Lids are normal. No scleral icterus.  HENT:      Head: Normocephalic and atraumatic.   Neck:      Thyroid: No thyroid mass.      Vascular: No carotid bruit or JVD. JVD normal.   Pulmonary:      Effort: Pulmonary effort is normal.      Breath sounds: Normal breath sounds.   Cardiovascular:      Normal rate. Regular rhythm.      Murmurs: There is no murmur.      No gallop.   Musculoskeletal:      Extremities: No clubbing present.Skin:     General: Skin is warm and dry. There is no cyanosis.   Neurological:      General: No focal deficit present.      Mental Status: Alert.   Psychiatric:         Attention and Perception: Attention normal.         Behavior: Behavior normal. Behavior is cooperative.         Result Review :    Lab Results   Component Value Date    GLUCOSE 99 12/16/2021    BUN 23 12/16/2021    CREATININE 0.91 12/16/2021    EGFRIFNONA 79 12/16/2021    BCR 25.3 (H) 12/16/2021    K 4.1 12/16/2021    CO2 28.4 12/16/2021    CALCIUM 9.1 12/16/2021    ALBUMIN 3.70 12/16/2021    AST 24 12/16/2021    ALT 18 12/16/2021     Lab Results   Component Value Date    WBC 8.18 05/13/2021 "    HGB 11.0 (L) 05/13/2021    HCT 33.3 (L) 05/13/2021    MCV 93.8 05/13/2021     05/13/2021     Lab Results   Component Value Date    CHOL 107 12/16/2021    TRIG 81 12/16/2021    HDL 37 (L) 12/16/2021    LDL 54 12/16/2021     Lab Results   Component Value Date    HGBA1C 6.22 (H) 12/16/2021         ECG 12 Lead    Date/Time: 2/24/2022 3:56 PM  Performed by: Titus Oliveros IV, MD  Authorized by: Titus Oliveros IV, MD   Comparison: compared with previous ECG from 6/1/2021  Similar to previous ECG  Rhythm: sinus rhythm  BPM: 73    Clinical impression: normal ECG             Assessment     Problem List Items Addressed This Visit        Cardiology Problems    Paroxysmal atrial fibrillation (HCC) - Primary (Chronic)    Overview     · Diagnosed August 2012.   · FARHAD-guided ECV (08/17/2012).  · CHADS-VASc 5 (age > 75, CAD, HTN, DM)  · Successful external cardioversion for asymptomatic atrial fibrillation with RVR, 10/25/18  · Successful cardioversion for atrial fibrillation RVR, 3/6/19.  Sotalol increased  · Clinic visit 4/9/2019 maintaining NSR  · Minimally symptomatic atrial fibrillation with cardioversion and the ER, 10/31/2019  · ED cardioversion for A. fib with RVR, 7/21/2020  · ED cardioversion for asymptomatic A. fib with RVR, 12/27/2020   · ED cardioversion for asymptomatic A. fib with RVR x 2  occasions, March 2021  · Transition from sotalol to amiodarone, 4/14/2021  · Successful FARHAD/ECV May 3, 2021         Current Assessment & Plan     · Maintaining sinus rhythm  · Reduce amiodarone to 100 mg daily  · Continue apixaban  · Will sign patient up for patient assistance program         Relevant Medications    apixaban (ELIQUIS) 5 MG tablet tablet    amiodarone (PACERONE) 100 MG tablet    Essential hypertension (Chronic)    Overview     • Target blood pressure <130/80 mmHg         Current Assessment & Plan     · Reasonably well-controlled  · Continue present medical therapy          Hyperlipidemia LDL goal <100 (Chronic)    Overview     · Moderate intensity statin therapy reasonable due to diabetic status         Current Assessment & Plan     · Continue pravastatin            Other    Type 2 diabetes mellitus, without long-term current use of insulin (HCC) (Chronic)    Current Assessment & Plan     · Well-controlled               Plan   • Reduce amiodarone dose to 100 mg daily  • Otherwise continue present medical therapy  • Will need TSH, PFT, ophthalmic exam in the next year      Follow-up   Return in about 6 months (around 8/24/2022).        Taurus Oliveros MD, FACC, [facility]  2/24/2022

## 2022-02-24 ENCOUNTER — OFFICE VISIT (OUTPATIENT)
Dept: CARDIOLOGY | Facility: CLINIC | Age: 87
End: 2022-02-24

## 2022-02-24 VITALS
BODY MASS INDEX: 32.18 KG/M2 | OXYGEN SATURATION: 97 % | HEART RATE: 73 BPM | DIASTOLIC BLOOD PRESSURE: 68 MMHG | SYSTOLIC BLOOD PRESSURE: 136 MMHG | WEIGHT: 258.8 LBS | HEIGHT: 75 IN

## 2022-02-24 DIAGNOSIS — I10 ESSENTIAL HYPERTENSION: ICD-10-CM

## 2022-02-24 DIAGNOSIS — I48.0 PAROXYSMAL ATRIAL FIBRILLATION: Primary | Chronic | ICD-10-CM

## 2022-02-24 DIAGNOSIS — E78.5 HYPERLIPIDEMIA LDL GOAL <100: ICD-10-CM

## 2022-02-24 DIAGNOSIS — E11.9 TYPE 2 DIABETES MELLITUS WITHOUT COMPLICATION, WITHOUT LONG-TERM CURRENT USE OF INSULIN: Chronic | ICD-10-CM

## 2022-02-24 PROBLEM — R10.9 ACUTE LEFT FLANK PAIN: Status: RESOLVED | Noted: 2021-05-10 | Resolved: 2022-02-24

## 2022-02-24 PROBLEM — I48.91 ATRIAL FIBRILLATION WITH RVR (HCC): Status: RESOLVED | Noted: 2018-10-25 | Resolved: 2022-02-24

## 2022-02-24 PROCEDURE — 93000 ELECTROCARDIOGRAM COMPLETE: CPT | Performed by: INTERNAL MEDICINE

## 2022-02-24 PROCEDURE — 99214 OFFICE O/P EST MOD 30 MIN: CPT | Performed by: INTERNAL MEDICINE

## 2022-02-24 RX ORDER — AMIODARONE HYDROCHLORIDE 100 MG/1
100 TABLET ORAL DAILY
Qty: 90 TABLET | Refills: 3 | Status: SHIPPED | OUTPATIENT
Start: 2022-02-24 | End: 2022-03-30 | Stop reason: SDUPTHER

## 2022-02-24 NOTE — ASSESSMENT & PLAN NOTE
· Maintaining sinus rhythm  · Reduce amiodarone to 100 mg daily  · Continue apixaban  · Will sign patient up for patient assistance program

## 2022-03-12 DIAGNOSIS — I10 ESSENTIAL HYPERTENSION: Chronic | ICD-10-CM

## 2022-03-14 RX ORDER — AMLODIPINE BESYLATE 10 MG/1
TABLET ORAL
Qty: 90 TABLET | Refills: 3 | Status: SHIPPED | OUTPATIENT
Start: 2022-03-14 | End: 2022-03-30 | Stop reason: SDUPTHER

## 2022-03-28 RX ORDER — FINASTERIDE 5 MG/1
TABLET, FILM COATED ORAL
Qty: 90 TABLET | Refills: 1 | Status: SHIPPED | OUTPATIENT
Start: 2022-03-28 | End: 2022-03-30 | Stop reason: SDUPTHER

## 2022-03-28 RX ORDER — ALLOPURINOL 300 MG/1
TABLET ORAL
Qty: 90 TABLET | Refills: 1 | Status: SHIPPED | OUTPATIENT
Start: 2022-03-28 | End: 2022-03-30 | Stop reason: SDUPTHER

## 2022-03-30 DIAGNOSIS — I48.0 PAROXYSMAL ATRIAL FIBRILLATION: Chronic | ICD-10-CM

## 2022-03-30 DIAGNOSIS — E78.5 HYPERLIPIDEMIA LDL GOAL <100: ICD-10-CM

## 2022-03-30 DIAGNOSIS — I10 ESSENTIAL HYPERTENSION: Chronic | ICD-10-CM

## 2022-03-30 RX ORDER — AMLODIPINE BESYLATE 10 MG/1
10 TABLET ORAL DAILY
Qty: 90 TABLET | Refills: 1 | Status: SHIPPED | OUTPATIENT
Start: 2022-03-30 | End: 2022-06-25 | Stop reason: HOSPADM

## 2022-03-30 RX ORDER — METHENAMINE HIPPURATE 1000 MG/1
TABLET ORAL
Qty: 90 TABLET | Refills: 1 | Status: SHIPPED | OUTPATIENT
Start: 2022-03-30 | End: 2022-06-25 | Stop reason: HOSPADM

## 2022-03-30 RX ORDER — APIXABAN 5 MG/1
TABLET, FILM COATED ORAL
Qty: 180 TABLET | Refills: 3 | Status: ON HOLD | OUTPATIENT
Start: 2022-03-30 | End: 2022-08-05 | Stop reason: SDUPTHER

## 2022-03-30 RX ORDER — LISINOPRIL 40 MG/1
40 TABLET ORAL DAILY
Qty: 90 TABLET | Refills: 1 | Status: SHIPPED | OUTPATIENT
Start: 2022-03-30 | End: 2022-06-25 | Stop reason: HOSPADM

## 2022-03-30 RX ORDER — HYDROCHLOROTHIAZIDE 12.5 MG/1
12.5 CAPSULE, GELATIN COATED ORAL EVERY MORNING
Qty: 90 CAPSULE | Refills: 1 | Status: SHIPPED | OUTPATIENT
Start: 2022-03-30 | End: 2022-08-05 | Stop reason: HOSPADM

## 2022-03-30 RX ORDER — PRAVASTATIN SODIUM 40 MG
40 TABLET ORAL DAILY
Qty: 90 TABLET | Refills: 1 | Status: SHIPPED | OUTPATIENT
Start: 2022-03-30 | End: 2022-09-14

## 2022-03-30 RX ORDER — AMIODARONE HYDROCHLORIDE 100 MG/1
100 TABLET ORAL DAILY
Qty: 90 TABLET | Refills: 3 | Status: SHIPPED | OUTPATIENT
Start: 2022-03-30 | End: 2022-08-02 | Stop reason: SDUPTHER

## 2022-03-30 RX ORDER — OMEPRAZOLE 20 MG/1
20 CAPSULE, DELAYED RELEASE ORAL DAILY
Qty: 90 CAPSULE | Refills: 1 | Status: SHIPPED | OUTPATIENT
Start: 2022-03-30 | End: 2022-09-16

## 2022-03-30 RX ORDER — TAMSULOSIN HYDROCHLORIDE 0.4 MG/1
1 CAPSULE ORAL DAILY
Qty: 90 CAPSULE | Refills: 1 | Status: SHIPPED | OUTPATIENT
Start: 2022-03-30 | End: 2022-09-16

## 2022-03-30 RX ORDER — FINASTERIDE 5 MG/1
5 TABLET, FILM COATED ORAL
Qty: 90 TABLET | Refills: 1 | Status: SHIPPED | OUTPATIENT
Start: 2022-03-30 | End: 2022-12-10 | Stop reason: SDUPTHER

## 2022-03-30 RX ORDER — ALLOPURINOL 300 MG/1
300 TABLET ORAL DAILY
Qty: 90 TABLET | Refills: 1 | Status: SHIPPED | OUTPATIENT
Start: 2022-03-30 | End: 2022-12-10 | Stop reason: SDUPTHER

## 2022-04-20 ENCOUNTER — OFFICE VISIT (OUTPATIENT)
Dept: INTERNAL MEDICINE | Facility: CLINIC | Age: 87
End: 2022-04-20

## 2022-04-20 VITALS
BODY MASS INDEX: 32 KG/M2 | HEART RATE: 76 BPM | SYSTOLIC BLOOD PRESSURE: 140 MMHG | WEIGHT: 256 LBS | RESPIRATION RATE: 20 BRPM | DIASTOLIC BLOOD PRESSURE: 72 MMHG | TEMPERATURE: 97.8 F

## 2022-04-20 DIAGNOSIS — I10 ESSENTIAL HYPERTENSION: ICD-10-CM

## 2022-04-20 DIAGNOSIS — K21.00 GASTROESOPHAGEAL REFLUX DISEASE WITH ESOPHAGITIS WITHOUT HEMORRHAGE: ICD-10-CM

## 2022-04-20 DIAGNOSIS — E11.9 TYPE 2 DIABETES MELLITUS WITHOUT COMPLICATION, WITHOUT LONG-TERM CURRENT USE OF INSULIN: Primary | ICD-10-CM

## 2022-04-20 DIAGNOSIS — E78.5 HYPERLIPIDEMIA, UNSPECIFIED HYPERLIPIDEMIA TYPE: ICD-10-CM

## 2022-04-20 LAB — HBA1C MFR BLD: 6 % (ref 4.8–5.6)

## 2022-04-20 PROCEDURE — 99214 OFFICE O/P EST MOD 30 MIN: CPT | Performed by: INTERNAL MEDICINE

## 2022-04-20 PROCEDURE — 83036 HEMOGLOBIN GLYCOSYLATED A1C: CPT | Performed by: INTERNAL MEDICINE

## 2022-04-20 PROCEDURE — 80053 COMPREHEN METABOLIC PANEL: CPT | Performed by: INTERNAL MEDICINE

## 2022-04-20 PROCEDURE — 80061 LIPID PANEL: CPT | Performed by: INTERNAL MEDICINE

## 2022-04-20 NOTE — PROGRESS NOTES
Chief Complaint   Patient presents with   • Follow-up     4 month follow up chronic medical problems       History of Present Illness      The patient presents for a follow-up related to Type 2 Diabetes Mellitus. He does not check his blood sugars at home. He denies hypoglycemic symptoms. The patient denies polyuria, polydypsia or polyphagia. He reports no symptoms of neuropathy. The patient takes his medication as prescribed. He is not taking insulin. The patient does check his feet daily at home. He denies chest pain, shortness of breath, orthopnea, paroxysmal nocturnal dyspnea, dyspnea on exertion, edema, palpitations or syncope.    The patient presents for a follow-up related to hypertension. The patient reports that he has had no headaches or blurred vision. He states that he is taking his medication as prescribed. He is not having medication side effects.    The patient presents for a follow-up related to GERD. The patient is on omeprazole for his gastroesophageal reflux. The medication is taken on a regular basis and gives complete relief of the symptoms. He reports no abdominal pain, belching, diarrhea, dysphagia, early satiety, heartburn, hoarseness, nausea, odynophagia, rectal bleeding, vomiting or weight loss. The GERD has no known aggravating factors.    The patient presents for a follow-up related to hyperlipidemia. He is following a low fat diet. He reports that he is exercising. He is taking pravastatin. The patient is taking his medication as prescribed. He reports no medication side effects, including muscle cramps, abdominal pain, headaches or weakness.    Review of Systems    CONSTITUTIONAL- Denies Fever, Chills, Sweats, Fatigue, Weakness or Malaise.    PULMONARY- Denies Wheezing, Sputum Production, Cough, Hemoptysis or Pleuritic Chest Pain.    CARDIOVASCULAR- Denies Claudication or Irregular Heart Beat.    Medications      Current Outpatient Medications:   •  allopurinol (ZYLOPRIM) 300 MG tablet,  Take 1 tablet by mouth Daily., Disp: 90 tablet, Rfl: 1  •  amiodarone (PACERONE) 100 MG tablet, Take 1 tablet by mouth Daily., Disp: 90 tablet, Rfl: 3  •  amLODIPine (NORVASC) 10 MG tablet, Take 1 tablet by mouth Daily., Disp: 90 tablet, Rfl: 1  •  Ascorbic Acid (VITAMIN C) 500 MG capsule, Take 1 tablet by mouth 4 (four) times a day., Disp: , Rfl:   •  docusate sodium (COLACE) 100 MG capsule, Take 300 mg by mouth every night., Disp: , Rfl:   •  Eliquis 5 MG tablet tablet, TAKE 1 TABLET BY MOUTH EVERY 12 (TWELVE) HOURS., Disp: 180 tablet, Rfl: 3  •  Ferrous Gluconate (IRON) 240 (27 FE) MG tablet, Take 1 tablet by mouth daily., Disp: , Rfl:   •  finasteride (PROSCAR) 5 MG tablet, Take 1 tablet by mouth every night at bedtime., Disp: 90 tablet, Rfl: 1  •  hydroCHLOROthiazide (MICROZIDE) 12.5 MG capsule, Take 1 capsule by mouth Every Morning., Disp: 90 capsule, Rfl: 1  •  lisinopril (PRINIVIL,ZESTRIL) 40 MG tablet, Take 1 tablet by mouth Daily., Disp: 90 tablet, Rfl: 1  •  metFORMIN (GLUCOPHAGE) 1000 MG tablet, Take 1 tablet by mouth 2 (Two) Times a Day., Disp: 180 tablet, Rfl: 1  •  methenamine (HIPREX) 1 g tablet, Take 1/2 (One-half) tablet by mouth twice daily with a meal., Disp: 90 tablet, Rfl: 1  •  omeprazole (priLOSEC) 20 MG capsule, Take 1 capsule by mouth Daily., Disp: 90 capsule, Rfl: 1  •  pravastatin (PRAVACHOL) 40 MG tablet, Take 1 tablet by mouth Daily., Disp: 90 tablet, Rfl: 1  •  Probiotic Product (PROBIOTIC ADVANCED PO), Take 1 tablet by mouth 2 (Two) Times a Day., Disp: , Rfl:   •  tamsulosin (FLOMAX) 0.4 MG capsule 24 hr capsule, Take 1 capsule by mouth Daily., Disp: 90 capsule, Rfl: 1     Allergies    Allergies   Allergen Reactions   • Penicillins Hives   • Xarelto [Rivaroxaban]      GI bleed       Problem List    Patient Active Problem List   Diagnosis   • Atopic rhinitis   • Benign (familial) paraproteinemia   • Type 2 diabetes mellitus, without long-term current use of insulin (HCC)   • Enlarged  prostate without lower urinary tract symptoms (luts)   • Gastroesophageal reflux disease with esophagitis   • Polyp of gallbladder   • Gout   • Hematuria   • Liver cyst   • Hyperlipidemia LDL goal <100   • Essential hypertension   • Nephrolithiasis   • Obstructive sleep apnea syndrome   • Paroxysmal atrial fibrillation (HCC)   • Stage 3 chronic kidney disease (HCC)   • Long term current use of antiarrhythmic medical therapy   • Hydronephrosis       Medications, Allergies, Problems List and Past History were reviewed and updated.    Physical Examination    /72 (BP Location: Left arm, Patient Position: Sitting, Cuff Size: Adult)   Pulse 76   Temp 97.8 °F (36.6 °C) (Infrared)   Resp 20   Wt 116 kg (256 lb)   BMI 32.00 kg/m²     HEENT: Facies- Within normal limits. Lids- Normal bilaterally. Conjunctiva- Clear bilaterally. Sclera- Anicteric bilaterally.    Neck: Thyroid- non enlarged, symmetric and has no nodules. No bruits are detected.    Lungs: Auscultation- Clear to auscultation bilaterally. There are no retractions, clubbing or cyanosis. The Expiratory to Inspiratory ratio is equal.    Cardiovascular: There are no carotid bruits. Heart- Normal Rate with Regular rhythm and no murmurs. There are no gallops. There are no rubs. In the lower extremities there is no edema. The upper extremities do not have edema.    Abdomen: Soft, benign, non-tender with no masses, hernias, organomegaly or scars.    Impression and Assessment    Type 2 Diabetes Mellitus without complication without insulin usage.    Essential Hypertension.    Gastroesophageal Reflux Disease.    Hyperlipidemia.    Plan    Gastroesophageal Reflux Disease Plan: The patient was instructed to continue the current medications.    Essential Hypertension Plan: The patient was instructed to continue the current medications.    Hyperlipidemia Plan: The patient was instructed to exercise daily, eat a low fat diet and continue his medications.    Type 2  Diabetes Mellitus without complication without insulin usage Plan: The patient was instructed to continue the current medications.    Diagnoses and all orders for this visit:    1. Type 2 diabetes mellitus without complication, without long-term current use of insulin (HCC) (Primary)  -     Comprehensive Metabolic Panel; Future  -     Hemoglobin A1c; Future  -     Comprehensive Metabolic Panel  -     Hemoglobin A1c    2. Essential hypertension  -     Comprehensive Metabolic Panel; Future  -     Comprehensive Metabolic Panel    3. Gastroesophageal reflux disease with esophagitis without hemorrhage    4. Hyperlipidemia, unspecified hyperlipidemia type  -     Comprehensive Metabolic Panel; Future  -     Lipid Panel; Future  -     Comprehensive Metabolic Panel  -     Lipid Panel        Return to Office    The patient was instructed to return for follow-up in 4 months. The patient was instructed to return sooner if the condition changes, worsens, or does not resolve.

## 2022-04-21 LAB
ALBUMIN SERPL-MCNC: 3.9 G/DL (ref 3.5–5.2)
ALBUMIN/GLOB SERPL: 1.3 G/DL
ALP SERPL-CCNC: 90 U/L (ref 39–117)
ALT SERPL W P-5'-P-CCNC: 18 U/L (ref 1–41)
ANION GAP SERPL CALCULATED.3IONS-SCNC: 11 MMOL/L (ref 5–15)
AST SERPL-CCNC: 19 U/L (ref 1–40)
BILIRUB SERPL-MCNC: 0.6 MG/DL (ref 0–1.2)
BUN SERPL-MCNC: 18 MG/DL (ref 8–23)
BUN/CREAT SERPL: 19.6 (ref 7–25)
CALCIUM SPEC-SCNC: 9.1 MG/DL (ref 8.6–10.5)
CHLORIDE SERPL-SCNC: 100 MMOL/L (ref 98–107)
CHOLEST SERPL-MCNC: 102 MG/DL (ref 0–200)
CO2 SERPL-SCNC: 27 MMOL/L (ref 22–29)
CREAT SERPL-MCNC: 0.92 MG/DL (ref 0.76–1.27)
EGFRCR SERPLBLD CKD-EPI 2021: 81.5 ML/MIN/1.73
GLOBULIN UR ELPH-MCNC: 3.1 GM/DL
GLUCOSE SERPL-MCNC: 121 MG/DL (ref 65–99)
HDLC SERPL-MCNC: 33 MG/DL (ref 40–60)
LDLC SERPL CALC-MCNC: 53 MG/DL (ref 0–100)
LDLC/HDLC SERPL: 1.61 {RATIO}
POTASSIUM SERPL-SCNC: 3.8 MMOL/L (ref 3.5–5.2)
PROT SERPL-MCNC: 7 G/DL (ref 6–8.5)
SODIUM SERPL-SCNC: 138 MMOL/L (ref 136–145)
TRIGL SERPL-MCNC: 79 MG/DL (ref 0–150)
VLDLC SERPL-MCNC: 16 MG/DL (ref 5–40)

## 2022-06-21 ENCOUNTER — APPOINTMENT (OUTPATIENT)
Dept: CT IMAGING | Facility: HOSPITAL | Age: 87
End: 2022-06-21

## 2022-06-21 ENCOUNTER — HOSPITAL ENCOUNTER (INPATIENT)
Facility: HOSPITAL | Age: 87
LOS: 4 days | Discharge: HOME | End: 2022-06-25
Attending: EMERGENCY MEDICINE | Admitting: INTERNAL MEDICINE

## 2022-06-21 ENCOUNTER — APPOINTMENT (OUTPATIENT)
Dept: GENERAL RADIOLOGY | Facility: HOSPITAL | Age: 87
End: 2022-06-21

## 2022-06-21 DIAGNOSIS — J96.01 ACUTE RESPIRATORY FAILURE WITH HYPOXIA: ICD-10-CM

## 2022-06-21 DIAGNOSIS — J18.9 COMMUNITY ACQUIRED PNEUMONIA, UNSPECIFIED LATERALITY: ICD-10-CM

## 2022-06-21 DIAGNOSIS — N39.0 SEPSIS SECONDARY TO UTI: ICD-10-CM

## 2022-06-21 DIAGNOSIS — I48.91 ATRIAL FIBRILLATION WITH RAPID VENTRICULAR RESPONSE: Primary | ICD-10-CM

## 2022-06-21 DIAGNOSIS — A41.9 SEPSIS SECONDARY TO UTI: ICD-10-CM

## 2022-06-21 DIAGNOSIS — R65.20 SEVERE SEPSIS: ICD-10-CM

## 2022-06-21 DIAGNOSIS — A41.9 SEVERE SEPSIS: ICD-10-CM

## 2022-06-21 PROBLEM — E87.20 LACTIC ACIDOSIS: Status: ACTIVE | Noted: 2022-06-21

## 2022-06-21 PROBLEM — E83.42 HYPOMAGNESEMIA: Status: ACTIVE | Noted: 2022-06-21

## 2022-06-21 PROBLEM — E87.1 HYPONATREMIA: Status: ACTIVE | Noted: 2022-06-21

## 2022-06-21 LAB
ALBUMIN SERPL-MCNC: 3.7 G/DL (ref 3.5–5.2)
ALBUMIN/GLOB SERPL: 1.1 G/DL
ALP SERPL-CCNC: 97 U/L (ref 39–117)
ALT SERPL W P-5'-P-CCNC: 17 U/L (ref 1–41)
ANION GAP SERPL CALCULATED.3IONS-SCNC: 15 MMOL/L (ref 5–15)
AST SERPL-CCNC: 23 U/L (ref 1–40)
BACTERIA UR QL AUTO: ABNORMAL /HPF
BASOPHILS # BLD AUTO: 0.06 10*3/MM3 (ref 0–0.2)
BASOPHILS NFR BLD AUTO: 0.5 % (ref 0–1.5)
BILIRUB SERPL-MCNC: 1.6 MG/DL (ref 0–1.2)
BILIRUB UR QL STRIP: NEGATIVE
BUN SERPL-MCNC: 21 MG/DL (ref 8–23)
BUN/CREAT SERPL: 23.3 (ref 7–25)
CALCIUM SPEC-SCNC: 9 MG/DL (ref 8.6–10.5)
CHLORIDE SERPL-SCNC: 93 MMOL/L (ref 98–107)
CLARITY UR: ABNORMAL
CO2 SERPL-SCNC: 24 MMOL/L (ref 22–29)
COLOR UR: YELLOW
CREAT SERPL-MCNC: 0.9 MG/DL (ref 0.76–1.27)
CRP SERPL-MCNC: 16.68 MG/DL (ref 0–0.5)
D DIMER PPP FEU-MCNC: 0.81 MCGFEU/ML (ref 0.01–0.5)
D-LACTATE SERPL-SCNC: 3.8 MMOL/L (ref 0.5–2)
DEPRECATED RDW RBC AUTO: 49.1 FL (ref 37–54)
EGFRCR SERPLBLD CKD-EPI 2021: 83.7 ML/MIN/1.73
EOSINOPHIL # BLD AUTO: 0 10*3/MM3 (ref 0–0.4)
EOSINOPHIL NFR BLD AUTO: 0 % (ref 0.3–6.2)
ERYTHROCYTE [DISTWIDTH] IN BLOOD BY AUTOMATED COUNT: 14.2 % (ref 12.3–15.4)
ERYTHROCYTE [SEDIMENTATION RATE] IN BLOOD: 60 MM/HR (ref 0–20)
FLUAV SUBTYP SPEC NAA+PROBE: NOT DETECTED
FLUBV RNA ISLT QL NAA+PROBE: NOT DETECTED
GLOBULIN UR ELPH-MCNC: 3.4 GM/DL
GLUCOSE SERPL-MCNC: 155 MG/DL (ref 65–99)
GLUCOSE UR STRIP-MCNC: NEGATIVE MG/DL
HCT VFR BLD AUTO: 40.3 % (ref 37.5–51)
HGB BLD-MCNC: 13.3 G/DL (ref 13–17.7)
HGB UR QL STRIP.AUTO: ABNORMAL
HOLD SPECIMEN: NORMAL
HYALINE CASTS UR QL AUTO: ABNORMAL /LPF
IMM GRANULOCYTES # BLD AUTO: 0.09 10*3/MM3 (ref 0–0.05)
IMM GRANULOCYTES NFR BLD AUTO: 0.8 % (ref 0–0.5)
INR PPP: 1.92 (ref 0.84–1.13)
KETONES UR QL STRIP: NEGATIVE
LDH SERPL-CCNC: 251 U/L (ref 135–225)
LEUKOCYTE ESTERASE UR QL STRIP.AUTO: ABNORMAL
LYMPHOCYTES # BLD AUTO: 0.34 10*3/MM3 (ref 0.7–3.1)
LYMPHOCYTES NFR BLD AUTO: 3 % (ref 19.6–45.3)
MAGNESIUM SERPL-MCNC: 1.5 MG/DL (ref 1.6–2.4)
MCH RBC QN AUTO: 31.1 PG (ref 26.6–33)
MCHC RBC AUTO-ENTMCNC: 33 G/DL (ref 31.5–35.7)
MCV RBC AUTO: 94.4 FL (ref 79–97)
MONOCYTES # BLD AUTO: 0.78 10*3/MM3 (ref 0.1–0.9)
MONOCYTES NFR BLD AUTO: 7 % (ref 5–12)
NEUTROPHILS NFR BLD AUTO: 88.7 % (ref 42.7–76)
NEUTROPHILS NFR BLD AUTO: 9.9 10*3/MM3 (ref 1.7–7)
NITRITE UR QL STRIP: NEGATIVE
NRBC BLD AUTO-RTO: 0 /100 WBC (ref 0–0.2)
NT-PROBNP SERPL-MCNC: 1562 PG/ML (ref 0–1800)
PH UR STRIP.AUTO: 5.5 [PH] (ref 5–8)
PLATELET # BLD AUTO: 137 10*3/MM3 (ref 140–450)
PMV BLD AUTO: 10.3 FL (ref 6–12)
POTASSIUM SERPL-SCNC: 3.7 MMOL/L (ref 3.5–5.2)
PROCALCITONIN SERPL-MCNC: 1.29 NG/ML (ref 0–0.25)
PROT SERPL-MCNC: 7.1 G/DL (ref 6–8.5)
PROT UR QL STRIP: ABNORMAL
PROTHROMBIN TIME: 22 SECONDS (ref 11.4–14.4)
RBC # BLD AUTO: 4.27 10*6/MM3 (ref 4.14–5.8)
RBC # UR STRIP: ABNORMAL /HPF
REF LAB TEST METHOD: ABNORMAL
SARS-COV-2 RNA PNL SPEC NAA+PROBE: NOT DETECTED
SODIUM SERPL-SCNC: 132 MMOL/L (ref 136–145)
SP GR UR STRIP: 1.02 (ref 1–1.03)
SQUAMOUS #/AREA URNS HPF: ABNORMAL /HPF
TROPONIN T SERPL-MCNC: <0.01 NG/ML (ref 0–0.03)
UROBILINOGEN UR QL STRIP: ABNORMAL
WBC # UR STRIP: ABNORMAL /HPF
WBC NRBC COR # BLD: 11.17 10*3/MM3 (ref 3.4–10.8)
WHOLE BLOOD HOLD COAG: NORMAL
WHOLE BLOOD HOLD SPECIMEN: NORMAL

## 2022-06-21 PROCEDURE — 85025 COMPLETE CBC W/AUTO DIFF WBC: CPT | Performed by: EMERGENCY MEDICINE

## 2022-06-21 PROCEDURE — 83036 HEMOGLOBIN GLYCOSYLATED A1C: CPT | Performed by: PHYSICIAN ASSISTANT

## 2022-06-21 PROCEDURE — 83605 ASSAY OF LACTIC ACID: CPT | Performed by: EMERGENCY MEDICINE

## 2022-06-21 PROCEDURE — 84145 PROCALCITONIN (PCT): CPT | Performed by: EMERGENCY MEDICINE

## 2022-06-21 PROCEDURE — 0202U NFCT DS 22 TRGT SARS-COV-2: CPT | Performed by: INTERNAL MEDICINE

## 2022-06-21 PROCEDURE — 87040 BLOOD CULTURE FOR BACTERIA: CPT | Performed by: EMERGENCY MEDICINE

## 2022-06-21 PROCEDURE — 83880 ASSAY OF NATRIURETIC PEPTIDE: CPT | Performed by: EMERGENCY MEDICINE

## 2022-06-21 PROCEDURE — 83735 ASSAY OF MAGNESIUM: CPT | Performed by: EMERGENCY MEDICINE

## 2022-06-21 PROCEDURE — 83615 LACTATE (LD) (LDH) ENZYME: CPT | Performed by: EMERGENCY MEDICINE

## 2022-06-21 PROCEDURE — 87086 URINE CULTURE/COLONY COUNT: CPT | Performed by: EMERGENCY MEDICINE

## 2022-06-21 PROCEDURE — 71275 CT ANGIOGRAPHY CHEST: CPT

## 2022-06-21 PROCEDURE — 85610 PROTHROMBIN TIME: CPT | Performed by: EMERGENCY MEDICINE

## 2022-06-21 PROCEDURE — 87150 DNA/RNA AMPLIFIED PROBE: CPT | Performed by: EMERGENCY MEDICINE

## 2022-06-21 PROCEDURE — 93005 ELECTROCARDIOGRAM TRACING: CPT | Performed by: EMERGENCY MEDICINE

## 2022-06-21 PROCEDURE — 36415 COLL VENOUS BLD VENIPUNCTURE: CPT

## 2022-06-21 PROCEDURE — 87186 SC STD MICRODIL/AGAR DIL: CPT | Performed by: EMERGENCY MEDICINE

## 2022-06-21 PROCEDURE — 85652 RBC SED RATE AUTOMATED: CPT | Performed by: EMERGENCY MEDICINE

## 2022-06-21 PROCEDURE — 99223 1ST HOSP IP/OBS HIGH 75: CPT | Performed by: INTERNAL MEDICINE

## 2022-06-21 PROCEDURE — 87088 URINE BACTERIA CULTURE: CPT | Performed by: EMERGENCY MEDICINE

## 2022-06-21 PROCEDURE — 25010000002 CEFEPIME PER 500 MG: Performed by: EMERGENCY MEDICINE

## 2022-06-21 PROCEDURE — 71045 X-RAY EXAM CHEST 1 VIEW: CPT

## 2022-06-21 PROCEDURE — 84484 ASSAY OF TROPONIN QUANT: CPT | Performed by: EMERGENCY MEDICINE

## 2022-06-21 PROCEDURE — 80053 COMPREHEN METABOLIC PANEL: CPT | Performed by: EMERGENCY MEDICINE

## 2022-06-21 PROCEDURE — 87147 CULTURE TYPE IMMUNOLOGIC: CPT | Performed by: EMERGENCY MEDICINE

## 2022-06-21 PROCEDURE — 87636 SARSCOV2 & INF A&B AMP PRB: CPT | Performed by: EMERGENCY MEDICINE

## 2022-06-21 PROCEDURE — 99285 EMERGENCY DEPT VISIT HI MDM: CPT

## 2022-06-21 PROCEDURE — 87077 CULTURE AEROBIC IDENTIFY: CPT | Performed by: EMERGENCY MEDICINE

## 2022-06-21 PROCEDURE — 81001 URINALYSIS AUTO W/SCOPE: CPT | Performed by: EMERGENCY MEDICINE

## 2022-06-21 PROCEDURE — 85379 FIBRIN DEGRADATION QUANT: CPT | Performed by: EMERGENCY MEDICINE

## 2022-06-21 PROCEDURE — 25010000002 VANCOMYCIN 10 G RECONSTITUTED SOLUTION: Performed by: EMERGENCY MEDICINE

## 2022-06-21 PROCEDURE — 86140 C-REACTIVE PROTEIN: CPT | Performed by: EMERGENCY MEDICINE

## 2022-06-21 PROCEDURE — 0 IOPAMIDOL PER 1 ML: Performed by: EMERGENCY MEDICINE

## 2022-06-21 RX ORDER — ACETAMINOPHEN 500 MG
1000 TABLET ORAL ONCE
Status: COMPLETED | OUTPATIENT
Start: 2022-06-21 | End: 2022-06-21

## 2022-06-21 RX ORDER — SODIUM CHLORIDE 0.9 % (FLUSH) 0.9 %
10 SYRINGE (ML) INJECTION AS NEEDED
Status: DISCONTINUED | OUTPATIENT
Start: 2022-06-21 | End: 2022-06-25 | Stop reason: HOSPADM

## 2022-06-21 RX ADMIN — ACETAMINOPHEN 1000 MG: 500 TABLET ORAL at 19:41

## 2022-06-21 RX ADMIN — CEFEPIME 2 G: 2 INJECTION, POWDER, FOR SOLUTION INTRAVENOUS at 20:24

## 2022-06-21 RX ADMIN — IOPAMIDOL 85 ML: 755 INJECTION, SOLUTION INTRAVENOUS at 21:05

## 2022-06-21 RX ADMIN — SODIUM CHLORIDE 500 ML: 9 INJECTION, SOLUTION INTRAVENOUS at 19:33

## 2022-06-21 RX ADMIN — SODIUM CHLORIDE 500 ML: 9 INJECTION, SOLUTION INTRAVENOUS at 20:40

## 2022-06-21 RX ADMIN — VANCOMYCIN HYDROCHLORIDE 2250 MG: 500 INJECTION, POWDER, LYOPHILIZED, FOR SOLUTION INTRAVENOUS at 21:13

## 2022-06-22 LAB
ANION GAP SERPL CALCULATED.3IONS-SCNC: 12 MMOL/L (ref 5–15)
B PARAPERT DNA SPEC QL NAA+PROBE: NOT DETECTED
B PERT DNA SPEC QL NAA+PROBE: NOT DETECTED
BACTERIA BLD CULT: ABNORMAL
BUN SERPL-MCNC: 22 MG/DL (ref 8–23)
BUN/CREAT SERPL: 27.8 (ref 7–25)
C PNEUM DNA NPH QL NAA+NON-PROBE: NOT DETECTED
CALCIUM SPEC-SCNC: 8.3 MG/DL (ref 8.6–10.5)
CHLORIDE SERPL-SCNC: 103 MMOL/L (ref 98–107)
CO2 SERPL-SCNC: 26 MMOL/L (ref 22–29)
CREAT SERPL-MCNC: 0.79 MG/DL (ref 0.76–1.27)
D-LACTATE SERPL-SCNC: 1.6 MMOL/L (ref 0.5–2)
D-LACTATE SERPL-SCNC: 2.3 MMOL/L (ref 0.5–2)
D-LACTATE SERPL-SCNC: 2.8 MMOL/L (ref 0.5–2)
D-LACTATE SERPL-SCNC: 3.4 MMOL/L (ref 0.5–2)
D-LACTATE SERPL-SCNC: 4 MMOL/L (ref 0.5–2)
DEPRECATED RDW RBC AUTO: 50.9 FL (ref 37–54)
EGFRCR SERPLBLD CKD-EPI 2021: 87.1 ML/MIN/1.73
ERYTHROCYTE [DISTWIDTH] IN BLOOD BY AUTOMATED COUNT: 14.2 % (ref 12.3–15.4)
FLUAV SUBTYP SPEC NAA+PROBE: NOT DETECTED
FLUBV RNA ISLT QL NAA+PROBE: NOT DETECTED
GLUCOSE BLDC GLUCOMTR-MCNC: 121 MG/DL (ref 70–130)
GLUCOSE BLDC GLUCOMTR-MCNC: 124 MG/DL (ref 70–130)
GLUCOSE BLDC GLUCOMTR-MCNC: 139 MG/DL (ref 70–130)
GLUCOSE SERPL-MCNC: 146 MG/DL (ref 65–99)
HADV DNA SPEC NAA+PROBE: NOT DETECTED
HBA1C MFR BLD: 5.8 % (ref 4.8–5.6)
HCOV 229E RNA SPEC QL NAA+PROBE: NOT DETECTED
HCOV HKU1 RNA SPEC QL NAA+PROBE: NOT DETECTED
HCOV NL63 RNA SPEC QL NAA+PROBE: NOT DETECTED
HCOV OC43 RNA SPEC QL NAA+PROBE: NOT DETECTED
HCT VFR BLD AUTO: 40 % (ref 37.5–51)
HGB BLD-MCNC: 12.5 G/DL (ref 13–17.7)
HMPV RNA NPH QL NAA+NON-PROBE: NOT DETECTED
HPIV1 RNA ISLT QL NAA+PROBE: NOT DETECTED
HPIV2 RNA SPEC QL NAA+PROBE: NOT DETECTED
HPIV3 RNA NPH QL NAA+PROBE: NOT DETECTED
HPIV4 P GENE NPH QL NAA+PROBE: NOT DETECTED
L PNEUMO1 AG UR QL IA: NEGATIVE
M PNEUMO IGG SER IA-ACNC: NOT DETECTED
MAGNESIUM SERPL-MCNC: 1.9 MG/DL (ref 1.6–2.4)
MCH RBC QN AUTO: 30.3 PG (ref 26.6–33)
MCHC RBC AUTO-ENTMCNC: 31.3 G/DL (ref 31.5–35.7)
MCV RBC AUTO: 97.1 FL (ref 79–97)
MRSA DNA SPEC QL NAA+PROBE: POSITIVE
PLATELET # BLD AUTO: 118 10*3/MM3 (ref 140–450)
PMV BLD AUTO: 10.3 FL (ref 6–12)
POTASSIUM SERPL-SCNC: 3.6 MMOL/L (ref 3.5–5.2)
QT INTERVAL: 316 MS
QTC INTERVAL: 477 MS
RBC # BLD AUTO: 4.12 10*6/MM3 (ref 4.14–5.8)
RHINOVIRUS RNA SPEC NAA+PROBE: NOT DETECTED
RSV RNA NPH QL NAA+NON-PROBE: NOT DETECTED
S PNEUM AG SPEC QL LA: NEGATIVE
SARS-COV-2 RNA NPH QL NAA+NON-PROBE: NOT DETECTED
SODIUM SERPL-SCNC: 141 MMOL/L (ref 136–145)
WBC NRBC COR # BLD: 8.86 10*3/MM3 (ref 3.4–10.8)

## 2022-06-22 PROCEDURE — 97530 THERAPEUTIC ACTIVITIES: CPT

## 2022-06-22 PROCEDURE — 87899 AGENT NOS ASSAY W/OPTIC: CPT | Performed by: PHYSICIAN ASSISTANT

## 2022-06-22 PROCEDURE — 99233 SBSQ HOSP IP/OBS HIGH 50: CPT | Performed by: FAMILY MEDICINE

## 2022-06-22 PROCEDURE — 25010000002 CEFEPIME PER 500 MG: Performed by: PHYSICIAN ASSISTANT

## 2022-06-22 PROCEDURE — 97165 OT EVAL LOW COMPLEX 30 MIN: CPT

## 2022-06-22 PROCEDURE — 83605 ASSAY OF LACTIC ACID: CPT | Performed by: EMERGENCY MEDICINE

## 2022-06-22 PROCEDURE — 97162 PT EVAL MOD COMPLEX 30 MIN: CPT

## 2022-06-22 PROCEDURE — 87641 MR-STAPH DNA AMP PROBE: CPT | Performed by: PHYSICIAN ASSISTANT

## 2022-06-22 PROCEDURE — 87449 NOS EACH ORGANISM AG IA: CPT | Performed by: PHYSICIAN ASSISTANT

## 2022-06-22 PROCEDURE — 83735 ASSAY OF MAGNESIUM: CPT | Performed by: FAMILY MEDICINE

## 2022-06-22 PROCEDURE — 82962 GLUCOSE BLOOD TEST: CPT

## 2022-06-22 PROCEDURE — 0 MAGNESIUM SULFATE 4 GM/100ML SOLUTION: Performed by: PHYSICIAN ASSISTANT

## 2022-06-22 PROCEDURE — 80048 BASIC METABOLIC PNL TOTAL CA: CPT | Performed by: PHYSICIAN ASSISTANT

## 2022-06-22 PROCEDURE — 85027 COMPLETE CBC AUTOMATED: CPT | Performed by: PHYSICIAN ASSISTANT

## 2022-06-22 RX ORDER — DEXTROSE MONOHYDRATE 25 G/50ML
25 INJECTION, SOLUTION INTRAVENOUS
Status: DISCONTINUED | OUTPATIENT
Start: 2022-06-22 | End: 2022-06-25 | Stop reason: HOSPADM

## 2022-06-22 RX ORDER — ONDANSETRON 2 MG/ML
4 INJECTION INTRAMUSCULAR; INTRAVENOUS EVERY 6 HOURS PRN
Status: DISCONTINUED | OUTPATIENT
Start: 2022-06-22 | End: 2022-06-25 | Stop reason: HOSPADM

## 2022-06-22 RX ORDER — PRAVASTATIN SODIUM 40 MG
40 TABLET ORAL DAILY
Status: DISCONTINUED | OUTPATIENT
Start: 2022-06-22 | End: 2022-06-25 | Stop reason: HOSPADM

## 2022-06-22 RX ORDER — SODIUM CHLORIDE 0.9 % (FLUSH) 0.9 %
10 SYRINGE (ML) INJECTION AS NEEDED
Status: DISCONTINUED | OUTPATIENT
Start: 2022-06-22 | End: 2022-06-25 | Stop reason: HOSPADM

## 2022-06-22 RX ORDER — ACETAMINOPHEN 325 MG/1
650 TABLET ORAL EVERY 4 HOURS PRN
Status: DISCONTINUED | OUTPATIENT
Start: 2022-06-22 | End: 2022-06-25 | Stop reason: HOSPADM

## 2022-06-22 RX ORDER — DILTIAZEM HCL IN NACL,ISO-OSM 125 MG/125
5-15 PLASTIC BAG, INJECTION (ML) INTRAVENOUS
Status: DISCONTINUED | OUTPATIENT
Start: 2022-06-22 | End: 2022-06-24

## 2022-06-22 RX ORDER — L.ACID,PARA/B.BIFIDUM/S.THERM 8B CELL
1 CAPSULE ORAL DAILY
Status: DISCONTINUED | OUTPATIENT
Start: 2022-06-22 | End: 2022-06-25 | Stop reason: HOSPADM

## 2022-06-22 RX ORDER — AMIODARONE HYDROCHLORIDE 200 MG/1
100 TABLET ORAL DAILY
Status: DISCONTINUED | OUTPATIENT
Start: 2022-06-22 | End: 2022-06-25 | Stop reason: HOSPADM

## 2022-06-22 RX ORDER — MAGNESIUM SULFATE HEPTAHYDRATE 40 MG/ML
4 INJECTION, SOLUTION INTRAVENOUS AS NEEDED
Status: DISCONTINUED | OUTPATIENT
Start: 2022-06-22 | End: 2022-06-25 | Stop reason: HOSPADM

## 2022-06-22 RX ORDER — MAGNESIUM SULFATE HEPTAHYDRATE 40 MG/ML
2 INJECTION, SOLUTION INTRAVENOUS AS NEEDED
Status: DISCONTINUED | OUTPATIENT
Start: 2022-06-22 | End: 2022-06-25 | Stop reason: HOSPADM

## 2022-06-22 RX ORDER — ALLOPURINOL 300 MG/1
300 TABLET ORAL DAILY
Status: DISCONTINUED | OUTPATIENT
Start: 2022-06-22 | End: 2022-06-25 | Stop reason: HOSPADM

## 2022-06-22 RX ORDER — NICOTINE POLACRILEX 4 MG
15 LOZENGE BUCCAL
Status: DISCONTINUED | OUTPATIENT
Start: 2022-06-22 | End: 2022-06-25 | Stop reason: HOSPADM

## 2022-06-22 RX ORDER — TAMSULOSIN HYDROCHLORIDE 0.4 MG/1
0.4 CAPSULE ORAL DAILY
Status: DISCONTINUED | OUTPATIENT
Start: 2022-06-22 | End: 2022-06-25 | Stop reason: HOSPADM

## 2022-06-22 RX ORDER — IPRATROPIUM BROMIDE AND ALBUTEROL SULFATE 2.5; .5 MG/3ML; MG/3ML
3 SOLUTION RESPIRATORY (INHALATION) EVERY 4 HOURS PRN
Status: DISCONTINUED | OUTPATIENT
Start: 2022-06-22 | End: 2022-06-25 | Stop reason: HOSPADM

## 2022-06-22 RX ORDER — SODIUM CHLORIDE 9 MG/ML
125 INJECTION, SOLUTION INTRAVENOUS CONTINUOUS
Status: DISCONTINUED | OUTPATIENT
Start: 2022-06-22 | End: 2022-06-25 | Stop reason: HOSPADM

## 2022-06-22 RX ORDER — CHOLECALCIFEROL (VITAMIN D3) 125 MCG
5 CAPSULE ORAL NIGHTLY PRN
Status: DISCONTINUED | OUTPATIENT
Start: 2022-06-22 | End: 2022-06-25 | Stop reason: HOSPADM

## 2022-06-22 RX ORDER — SODIUM CHLORIDE, SODIUM LACTATE, POTASSIUM CHLORIDE, CALCIUM CHLORIDE 600; 310; 30; 20 MG/100ML; MG/100ML; MG/100ML; MG/100ML
75 INJECTION, SOLUTION INTRAVENOUS CONTINUOUS
Status: DISCONTINUED | OUTPATIENT
Start: 2022-06-22 | End: 2022-06-22

## 2022-06-22 RX ORDER — CHOLECALCIFEROL (VITAMIN D3) 125 MCG
5 CAPSULE ORAL NIGHTLY PRN
Status: DISCONTINUED | OUTPATIENT
Start: 2022-06-22 | End: 2022-06-22 | Stop reason: SDUPTHER

## 2022-06-22 RX ORDER — INSULIN LISPRO 100 [IU]/ML
0-7 INJECTION, SOLUTION INTRAVENOUS; SUBCUTANEOUS
Status: DISCONTINUED | OUTPATIENT
Start: 2022-06-22 | End: 2022-06-25 | Stop reason: HOSPADM

## 2022-06-22 RX ORDER — SODIUM CHLORIDE 0.9 % (FLUSH) 0.9 %
10 SYRINGE (ML) INJECTION EVERY 12 HOURS SCHEDULED
Status: DISCONTINUED | OUTPATIENT
Start: 2022-06-22 | End: 2022-06-25 | Stop reason: HOSPADM

## 2022-06-22 RX ORDER — PANTOPRAZOLE SODIUM 40 MG/1
40 TABLET, DELAYED RELEASE ORAL EVERY MORNING
Status: DISCONTINUED | OUTPATIENT
Start: 2022-06-22 | End: 2022-06-25 | Stop reason: HOSPADM

## 2022-06-22 RX ORDER — FINASTERIDE 5 MG/1
5 TABLET, FILM COATED ORAL DAILY
Status: DISCONTINUED | OUTPATIENT
Start: 2022-06-22 | End: 2022-06-25 | Stop reason: HOSPADM

## 2022-06-22 RX ADMIN — Medication 10 ML: at 08:15

## 2022-06-22 RX ADMIN — AMIODARONE HYDROCHLORIDE 100 MG: 200 TABLET ORAL at 08:09

## 2022-06-22 RX ADMIN — Medication 10 ML: at 01:56

## 2022-06-22 RX ADMIN — SODIUM CHLORIDE 600 ML: 9 INJECTION, SOLUTION INTRAVENOUS at 01:54

## 2022-06-22 RX ADMIN — SODIUM CHLORIDE 125 ML/HR: 9 INJECTION, SOLUTION INTRAVENOUS at 23:54

## 2022-06-22 RX ADMIN — SODIUM CHLORIDE 125 ML/HR: 9 INJECTION, SOLUTION INTRAVENOUS at 12:51

## 2022-06-22 RX ADMIN — SODIUM CHLORIDE, POTASSIUM CHLORIDE, SODIUM LACTATE AND CALCIUM CHLORIDE 75 ML/HR: 600; 310; 30; 20 INJECTION, SOLUTION INTRAVENOUS at 02:02

## 2022-06-22 RX ADMIN — Medication 5 MG/HR: at 16:20

## 2022-06-22 RX ADMIN — ALLOPURINOL 300 MG: 300 TABLET ORAL at 08:09

## 2022-06-22 RX ADMIN — ACETAMINOPHEN 650 MG: 325 TABLET ORAL at 20:55

## 2022-06-22 RX ADMIN — APIXABAN 5 MG: 5 TABLET, FILM COATED ORAL at 01:54

## 2022-06-22 RX ADMIN — Medication 5 MG: at 20:11

## 2022-06-22 RX ADMIN — APIXABAN 5 MG: 5 TABLET, FILM COATED ORAL at 20:11

## 2022-06-22 RX ADMIN — CEFEPIME HYDROCHLORIDE 2 G: 2 INJECTION, POWDER, FOR SOLUTION INTRAVENOUS at 20:55

## 2022-06-22 RX ADMIN — TAMSULOSIN HYDROCHLORIDE 0.4 MG: 0.4 CAPSULE ORAL at 08:09

## 2022-06-22 RX ADMIN — Medication 1 CAPSULE: at 08:09

## 2022-06-22 RX ADMIN — PRAVASTATIN SODIUM 40 MG: 40 TABLET ORAL at 08:09

## 2022-06-22 RX ADMIN — CEFEPIME HYDROCHLORIDE 2 G: 2 INJECTION, POWDER, FOR SOLUTION INTRAVENOUS at 04:16

## 2022-06-22 RX ADMIN — SODIUM CHLORIDE 125 ML/HR: 9 INJECTION, SOLUTION INTRAVENOUS at 02:38

## 2022-06-22 RX ADMIN — MAGNESIUM SULFATE HEPTAHYDRATE 4 G: 40 INJECTION, SOLUTION INTRAVENOUS at 10:03

## 2022-06-22 RX ADMIN — Medication 10 ML: at 20:11

## 2022-06-22 RX ADMIN — PANTOPRAZOLE SODIUM 40 MG: 40 TABLET, DELAYED RELEASE ORAL at 04:17

## 2022-06-22 RX ADMIN — CEFEPIME HYDROCHLORIDE 2 G: 2 INJECTION, POWDER, FOR SOLUTION INTRAVENOUS at 13:06

## 2022-06-22 RX ADMIN — FINASTERIDE 5 MG: 5 TABLET, FILM COATED ORAL at 08:14

## 2022-06-23 ENCOUNTER — APPOINTMENT (OUTPATIENT)
Dept: CT IMAGING | Facility: HOSPITAL | Age: 87
End: 2022-06-23

## 2022-06-23 PROBLEM — E87.20 LACTIC ACIDOSIS: Status: RESOLVED | Noted: 2022-06-21 | Resolved: 2022-06-23

## 2022-06-23 PROBLEM — I48.91 ATRIAL FIBRILLATION WITH RVR: Status: RESOLVED | Noted: 2018-10-25 | Resolved: 2022-06-23

## 2022-06-23 PROBLEM — A41.9 SEPSIS DUE TO PNEUMONIA: Status: RESOLVED | Noted: 2022-06-21 | Resolved: 2022-06-23

## 2022-06-23 PROBLEM — A41.51 E. COLI SEPSIS (HCC): Status: ACTIVE | Noted: 2022-06-23

## 2022-06-23 PROBLEM — J96.01 ACUTE RESPIRATORY FAILURE WITH HYPOXIA (HCC): Status: RESOLVED | Noted: 2022-06-21 | Resolved: 2022-06-23

## 2022-06-23 PROBLEM — J18.9 SEPSIS DUE TO PNEUMONIA (HCC): Status: RESOLVED | Noted: 2022-06-21 | Resolved: 2022-06-23

## 2022-06-23 LAB
ANION GAP SERPL CALCULATED.3IONS-SCNC: 10 MMOL/L (ref 5–15)
BACTERIA BLD CULT: ABNORMAL
BACTERIA SPEC AEROBE CULT: ABNORMAL
BASOPHILS # BLD AUTO: 0.02 10*3/MM3 (ref 0–0.2)
BASOPHILS NFR BLD AUTO: 0.3 % (ref 0–1.5)
BUN SERPL-MCNC: 17 MG/DL (ref 8–23)
BUN/CREAT SERPL: 33.3 (ref 7–25)
CALCIUM SPEC-SCNC: 7.9 MG/DL (ref 8.6–10.5)
CHLORIDE SERPL-SCNC: 100 MMOL/L (ref 98–107)
CO2 SERPL-SCNC: 25 MMOL/L (ref 22–29)
CREAT SERPL-MCNC: 0.51 MG/DL (ref 0.76–1.27)
DEPRECATED RDW RBC AUTO: 47.5 FL (ref 37–54)
EGFRCR SERPLBLD CKD-EPI 2021: 99.4 ML/MIN/1.73
EOSINOPHIL # BLD AUTO: 0.03 10*3/MM3 (ref 0–0.4)
EOSINOPHIL NFR BLD AUTO: 0.5 % (ref 0.3–6.2)
ERYTHROCYTE [DISTWIDTH] IN BLOOD BY AUTOMATED COUNT: 13.9 % (ref 12.3–15.4)
GLUCOSE BLDC GLUCOMTR-MCNC: 132 MG/DL (ref 70–130)
GLUCOSE BLDC GLUCOMTR-MCNC: 134 MG/DL (ref 70–130)
GLUCOSE BLDC GLUCOMTR-MCNC: 217 MG/DL (ref 70–130)
GLUCOSE BLDC GLUCOMTR-MCNC: 221 MG/DL (ref 70–130)
GLUCOSE SERPL-MCNC: 137 MG/DL (ref 65–99)
HCT VFR BLD AUTO: 36.7 % (ref 37.5–51)
HGB BLD-MCNC: 12.2 G/DL (ref 13–17.7)
IMM GRANULOCYTES # BLD AUTO: 0.03 10*3/MM3 (ref 0–0.05)
IMM GRANULOCYTES NFR BLD AUTO: 0.5 % (ref 0–0.5)
LYMPHOCYTES # BLD AUTO: 0.67 10*3/MM3 (ref 0.7–3.1)
LYMPHOCYTES NFR BLD AUTO: 10.2 % (ref 19.6–45.3)
MAGNESIUM SERPL-MCNC: 1.9 MG/DL (ref 1.6–2.4)
MCH RBC QN AUTO: 30.8 PG (ref 26.6–33)
MCHC RBC AUTO-ENTMCNC: 33.2 G/DL (ref 31.5–35.7)
MCV RBC AUTO: 92.7 FL (ref 79–97)
MONOCYTES # BLD AUTO: 0.78 10*3/MM3 (ref 0.1–0.9)
MONOCYTES NFR BLD AUTO: 11.9 % (ref 5–12)
NEUTROPHILS NFR BLD AUTO: 5.02 10*3/MM3 (ref 1.7–7)
NEUTROPHILS NFR BLD AUTO: 76.6 % (ref 42.7–76)
NRBC BLD AUTO-RTO: 0 /100 WBC (ref 0–0.2)
PLATELET # BLD AUTO: 116 10*3/MM3 (ref 140–450)
PMV BLD AUTO: 10.5 FL (ref 6–12)
POTASSIUM SERPL-SCNC: 3.3 MMOL/L (ref 3.5–5.2)
POTASSIUM SERPL-SCNC: 3.6 MMOL/L (ref 3.5–5.2)
PSA SERPL-MCNC: 0.14 NG/ML (ref 0–4)
RBC # BLD AUTO: 3.96 10*6/MM3 (ref 4.14–5.8)
SODIUM SERPL-SCNC: 135 MMOL/L (ref 136–145)
WBC NRBC COR # BLD: 6.55 10*3/MM3 (ref 3.4–10.8)

## 2022-06-23 PROCEDURE — 0 MAGNESIUM SULFATE 4 GM/100ML SOLUTION: Performed by: PHYSICIAN ASSISTANT

## 2022-06-23 PROCEDURE — 84153 ASSAY OF PSA TOTAL: CPT | Performed by: INTERNAL MEDICINE

## 2022-06-23 PROCEDURE — 80048 BASIC METABOLIC PNL TOTAL CA: CPT | Performed by: FAMILY MEDICINE

## 2022-06-23 PROCEDURE — 99221 1ST HOSP IP/OBS SF/LOW 40: CPT | Performed by: INTERNAL MEDICINE

## 2022-06-23 PROCEDURE — 83735 ASSAY OF MAGNESIUM: CPT | Performed by: FAMILY MEDICINE

## 2022-06-23 PROCEDURE — 84132 ASSAY OF SERUM POTASSIUM: CPT | Performed by: FAMILY MEDICINE

## 2022-06-23 PROCEDURE — 82962 GLUCOSE BLOOD TEST: CPT

## 2022-06-23 PROCEDURE — 85025 COMPLETE CBC W/AUTO DIFF WBC: CPT | Performed by: FAMILY MEDICINE

## 2022-06-23 PROCEDURE — 99232 SBSQ HOSP IP/OBS MODERATE 35: CPT | Performed by: FAMILY MEDICINE

## 2022-06-23 PROCEDURE — 74176 CT ABD & PELVIS W/O CONTRAST: CPT

## 2022-06-23 PROCEDURE — 25010000002 CEFEPIME PER 500 MG: Performed by: PHYSICIAN ASSISTANT

## 2022-06-23 PROCEDURE — 63710000001 INSULIN LISPRO (HUMAN) PER 5 UNITS: Performed by: PHYSICIAN ASSISTANT

## 2022-06-23 RX ORDER — POTASSIUM CHLORIDE 7.45 MG/ML
10 INJECTION INTRAVENOUS
Status: DISCONTINUED | OUTPATIENT
Start: 2022-06-23 | End: 2022-06-25 | Stop reason: HOSPADM

## 2022-06-23 RX ORDER — POTASSIUM CHLORIDE 750 MG/1
40 CAPSULE, EXTENDED RELEASE ORAL AS NEEDED
Status: DISCONTINUED | OUTPATIENT
Start: 2022-06-23 | End: 2022-06-25 | Stop reason: HOSPADM

## 2022-06-23 RX ORDER — POTASSIUM CHLORIDE 1.5 G/1.77G
40 POWDER, FOR SOLUTION ORAL AS NEEDED
Status: DISCONTINUED | OUTPATIENT
Start: 2022-06-23 | End: 2022-06-25 | Stop reason: HOSPADM

## 2022-06-23 RX ADMIN — Medication 1 CAPSULE: at 08:12

## 2022-06-23 RX ADMIN — POTASSIUM CHLORIDE 40 MEQ: 750 CAPSULE, EXTENDED RELEASE ORAL at 14:51

## 2022-06-23 RX ADMIN — PANTOPRAZOLE SODIUM 40 MG: 40 TABLET, DELAYED RELEASE ORAL at 05:14

## 2022-06-23 RX ADMIN — SODIUM CHLORIDE 125 ML/HR: 9 INJECTION, SOLUTION INTRAVENOUS at 23:18

## 2022-06-23 RX ADMIN — Medication 15 MG/HR: at 13:40

## 2022-06-23 RX ADMIN — SODIUM CHLORIDE 125 ML/HR: 9 INJECTION, SOLUTION INTRAVENOUS at 08:11

## 2022-06-23 RX ADMIN — MAGNESIUM SULFATE HEPTAHYDRATE 4 G: 40 INJECTION, SOLUTION INTRAVENOUS at 08:36

## 2022-06-23 RX ADMIN — ALLOPURINOL 300 MG: 300 TABLET ORAL at 08:12

## 2022-06-23 RX ADMIN — APIXABAN 5 MG: 5 TABLET, FILM COATED ORAL at 08:12

## 2022-06-23 RX ADMIN — CEFEPIME HYDROCHLORIDE 2 G: 2 INJECTION, POWDER, FOR SOLUTION INTRAVENOUS at 03:23

## 2022-06-23 RX ADMIN — Medication 10 ML: at 20:19

## 2022-06-23 RX ADMIN — PRAVASTATIN SODIUM 40 MG: 40 TABLET ORAL at 08:12

## 2022-06-23 RX ADMIN — POTASSIUM CHLORIDE 40 MEQ: 750 CAPSULE, EXTENDED RELEASE ORAL at 08:36

## 2022-06-23 RX ADMIN — Medication 5 MG: at 23:11

## 2022-06-23 RX ADMIN — TAMSULOSIN HYDROCHLORIDE 0.4 MG: 0.4 CAPSULE ORAL at 08:12

## 2022-06-23 RX ADMIN — CEFEPIME HYDROCHLORIDE 2 G: 2 INJECTION, POWDER, FOR SOLUTION INTRAVENOUS at 13:20

## 2022-06-23 RX ADMIN — INSULIN LISPRO 3 UNITS: 100 INJECTION, SOLUTION INTRAVENOUS; SUBCUTANEOUS at 17:35

## 2022-06-23 RX ADMIN — CEFEPIME HYDROCHLORIDE 2 G: 2 INJECTION, POWDER, FOR SOLUTION INTRAVENOUS at 20:19

## 2022-06-23 RX ADMIN — Medication 10 ML: at 08:12

## 2022-06-23 RX ADMIN — FINASTERIDE 5 MG: 5 TABLET, FILM COATED ORAL at 08:12

## 2022-06-23 RX ADMIN — AMIODARONE HYDROCHLORIDE 100 MG: 200 TABLET ORAL at 08:12

## 2022-06-23 RX ADMIN — APIXABAN 5 MG: 5 TABLET, FILM COATED ORAL at 20:19

## 2022-06-23 RX ADMIN — Medication 15 MG/HR: at 23:18

## 2022-06-24 PROBLEM — R78.81 BACTEREMIA: Status: ACTIVE | Noted: 2022-06-24

## 2022-06-24 LAB
ANION GAP SERPL CALCULATED.3IONS-SCNC: 6 MMOL/L (ref 5–15)
BACTERIA SPEC AEROBE CULT: ABNORMAL
BACTERIA SPEC AEROBE CULT: ABNORMAL
BUN SERPL-MCNC: 15 MG/DL (ref 8–23)
BUN/CREAT SERPL: 30.6 (ref 7–25)
CALCIUM SPEC-SCNC: 8.2 MG/DL (ref 8.6–10.5)
CHLORIDE SERPL-SCNC: 102 MMOL/L (ref 98–107)
CO2 SERPL-SCNC: 28 MMOL/L (ref 22–29)
CREAT SERPL-MCNC: 0.49 MG/DL (ref 0.76–1.27)
DEPRECATED RDW RBC AUTO: 45.1 FL (ref 37–54)
EGFRCR SERPLBLD CKD-EPI 2021: 100.6 ML/MIN/1.73
ERYTHROCYTE [DISTWIDTH] IN BLOOD BY AUTOMATED COUNT: 13.7 % (ref 12.3–15.4)
GLUCOSE BLDC GLUCOMTR-MCNC: 133 MG/DL (ref 70–130)
GLUCOSE BLDC GLUCOMTR-MCNC: 136 MG/DL (ref 70–130)
GLUCOSE BLDC GLUCOMTR-MCNC: 184 MG/DL (ref 70–130)
GLUCOSE SERPL-MCNC: 131 MG/DL (ref 65–99)
GRAM STN SPEC: ABNORMAL
HCT VFR BLD AUTO: 31.2 % (ref 37.5–51)
HGB BLD-MCNC: 10.6 G/DL (ref 13–17.7)
ISOLATED FROM: ABNORMAL
ISOLATED FROM: ABNORMAL
MAGNESIUM SERPL-MCNC: 2.1 MG/DL (ref 1.6–2.4)
MCH RBC QN AUTO: 30.5 PG (ref 26.6–33)
MCHC RBC AUTO-ENTMCNC: 34 G/DL (ref 31.5–35.7)
MCV RBC AUTO: 89.9 FL (ref 79–97)
PLATELET # BLD AUTO: 129 10*3/MM3 (ref 140–450)
PMV BLD AUTO: 10.3 FL (ref 6–12)
POTASSIUM SERPL-SCNC: 3.2 MMOL/L (ref 3.5–5.2)
RBC # BLD AUTO: 3.47 10*6/MM3 (ref 4.14–5.8)
SODIUM SERPL-SCNC: 136 MMOL/L (ref 136–145)
WBC NRBC COR # BLD: 5.55 10*3/MM3 (ref 3.4–10.8)

## 2022-06-24 PROCEDURE — 82962 GLUCOSE BLOOD TEST: CPT

## 2022-06-24 PROCEDURE — 25010000002 CEFEPIME PER 500 MG: Performed by: PHYSICIAN ASSISTANT

## 2022-06-24 PROCEDURE — 99232 SBSQ HOSP IP/OBS MODERATE 35: CPT | Performed by: NURSE PRACTITIONER

## 2022-06-24 PROCEDURE — 80048 BASIC METABOLIC PNL TOTAL CA: CPT | Performed by: FAMILY MEDICINE

## 2022-06-24 PROCEDURE — 99232 SBSQ HOSP IP/OBS MODERATE 35: CPT | Performed by: FAMILY MEDICINE

## 2022-06-24 PROCEDURE — 85027 COMPLETE CBC AUTOMATED: CPT | Performed by: FAMILY MEDICINE

## 2022-06-24 PROCEDURE — 83735 ASSAY OF MAGNESIUM: CPT | Performed by: FAMILY MEDICINE

## 2022-06-24 PROCEDURE — 63710000001 INSULIN LISPRO (HUMAN) PER 5 UNITS: Performed by: PHYSICIAN ASSISTANT

## 2022-06-24 PROCEDURE — 25010000002 CEFTRIAXONE PER 250 MG: Performed by: INTERNAL MEDICINE

## 2022-06-24 RX ORDER — METOPROLOL TARTRATE 50 MG/1
50 TABLET, FILM COATED ORAL EVERY 12 HOURS SCHEDULED
Status: DISCONTINUED | OUTPATIENT
Start: 2022-06-24 | End: 2022-06-25

## 2022-06-24 RX ADMIN — APIXABAN 5 MG: 5 TABLET, FILM COATED ORAL at 08:24

## 2022-06-24 RX ADMIN — METOPROLOL TARTRATE 50 MG: 50 TABLET, FILM COATED ORAL at 20:27

## 2022-06-24 RX ADMIN — POTASSIUM CHLORIDE 40 MEQ: 750 CAPSULE, EXTENDED RELEASE ORAL at 08:24

## 2022-06-24 RX ADMIN — POTASSIUM CHLORIDE 40 MEQ: 750 CAPSULE, EXTENDED RELEASE ORAL at 17:21

## 2022-06-24 RX ADMIN — Medication 10 ML: at 20:29

## 2022-06-24 RX ADMIN — APIXABAN 5 MG: 5 TABLET, FILM COATED ORAL at 20:27

## 2022-06-24 RX ADMIN — FINASTERIDE 5 MG: 5 TABLET, FILM COATED ORAL at 08:25

## 2022-06-24 RX ADMIN — ALLOPURINOL 300 MG: 300 TABLET ORAL at 08:24

## 2022-06-24 RX ADMIN — SODIUM CHLORIDE 125 ML/HR: 9 INJECTION, SOLUTION INTRAVENOUS at 08:33

## 2022-06-24 RX ADMIN — Medication 1 CAPSULE: at 08:24

## 2022-06-24 RX ADMIN — PANTOPRAZOLE SODIUM 40 MG: 40 TABLET, DELAYED RELEASE ORAL at 05:09

## 2022-06-24 RX ADMIN — METOPROLOL TARTRATE 50 MG: 50 TABLET, FILM COATED ORAL at 11:54

## 2022-06-24 RX ADMIN — INSULIN LISPRO 2 UNITS: 100 INJECTION, SOLUTION INTRAVENOUS; SUBCUTANEOUS at 11:53

## 2022-06-24 RX ADMIN — CEFEPIME HYDROCHLORIDE 2 G: 2 INJECTION, POWDER, FOR SOLUTION INTRAVENOUS at 05:09

## 2022-06-24 RX ADMIN — CEFTRIAXONE 2 G: 2 INJECTION, POWDER, FOR SOLUTION INTRAMUSCULAR; INTRAVENOUS at 11:53

## 2022-06-24 RX ADMIN — PRAVASTATIN SODIUM 40 MG: 40 TABLET ORAL at 08:24

## 2022-06-24 RX ADMIN — AMIODARONE HYDROCHLORIDE 100 MG: 200 TABLET ORAL at 08:25

## 2022-06-24 RX ADMIN — SODIUM CHLORIDE 125 ML/HR: 9 INJECTION, SOLUTION INTRAVENOUS at 18:25

## 2022-06-24 RX ADMIN — TAMSULOSIN HYDROCHLORIDE 0.4 MG: 0.4 CAPSULE ORAL at 08:25

## 2022-06-25 ENCOUNTER — READMISSION MANAGEMENT (OUTPATIENT)
Dept: CALL CENTER | Facility: HOSPITAL | Age: 87
End: 2022-06-25

## 2022-06-25 VITALS
SYSTOLIC BLOOD PRESSURE: 124 MMHG | BODY MASS INDEX: 31.01 KG/M2 | WEIGHT: 249.4 LBS | HEIGHT: 75 IN | OXYGEN SATURATION: 97 % | TEMPERATURE: 97.8 F | HEART RATE: 111 BPM | DIASTOLIC BLOOD PRESSURE: 95 MMHG | RESPIRATION RATE: 18 BRPM

## 2022-06-25 LAB
GLUCOSE BLDC GLUCOMTR-MCNC: 135 MG/DL (ref 70–130)
GLUCOSE BLDC GLUCOMTR-MCNC: 150 MG/DL (ref 70–130)

## 2022-06-25 PROCEDURE — 25010000002 CEFTRIAXONE PER 250 MG: Performed by: INTERNAL MEDICINE

## 2022-06-25 PROCEDURE — 82962 GLUCOSE BLOOD TEST: CPT

## 2022-06-25 PROCEDURE — 99239 HOSP IP/OBS DSCHRG MGMT >30: CPT | Performed by: NURSE PRACTITIONER

## 2022-06-25 PROCEDURE — 63710000001 INSULIN LISPRO (HUMAN) PER 5 UNITS: Performed by: PHYSICIAN ASSISTANT

## 2022-06-25 RX ORDER — METOPROLOL TARTRATE 50 MG/1
50 TABLET, FILM COATED ORAL ONCE
Status: COMPLETED | OUTPATIENT
Start: 2022-06-25 | End: 2022-06-25

## 2022-06-25 RX ORDER — METOPROLOL TARTRATE 100 MG/1
100 TABLET ORAL EVERY 12 HOURS SCHEDULED
Status: DISCONTINUED | OUTPATIENT
Start: 2022-06-25 | End: 2022-06-25 | Stop reason: HOSPADM

## 2022-06-25 RX ORDER — METOPROLOL TARTRATE 100 MG/1
100 TABLET ORAL EVERY 12 HOURS SCHEDULED
Qty: 60 TABLET | Refills: 0 | Status: SHIPPED | OUTPATIENT
Start: 2022-06-25 | End: 2022-06-29 | Stop reason: SDUPTHER

## 2022-06-25 RX ADMIN — CEFTRIAXONE 2 G: 2 INJECTION, POWDER, FOR SOLUTION INTRAMUSCULAR; INTRAVENOUS at 11:26

## 2022-06-25 RX ADMIN — FINASTERIDE 5 MG: 5 TABLET, FILM COATED ORAL at 08:57

## 2022-06-25 RX ADMIN — PANTOPRAZOLE SODIUM 40 MG: 40 TABLET, DELAYED RELEASE ORAL at 05:14

## 2022-06-25 RX ADMIN — Medication 1 CAPSULE: at 08:56

## 2022-06-25 RX ADMIN — Medication 10 ML: at 08:58

## 2022-06-25 RX ADMIN — ALLOPURINOL 300 MG: 300 TABLET ORAL at 08:56

## 2022-06-25 RX ADMIN — TAMSULOSIN HYDROCHLORIDE 0.4 MG: 0.4 CAPSULE ORAL at 08:56

## 2022-06-25 RX ADMIN — AMIODARONE HYDROCHLORIDE 100 MG: 200 TABLET ORAL at 08:56

## 2022-06-25 RX ADMIN — SODIUM CHLORIDE 125 ML/HR: 9 INJECTION, SOLUTION INTRAVENOUS at 02:32

## 2022-06-25 RX ADMIN — APIXABAN 5 MG: 5 TABLET, FILM COATED ORAL at 08:56

## 2022-06-25 RX ADMIN — PRAVASTATIN SODIUM 40 MG: 40 TABLET ORAL at 08:56

## 2022-06-25 RX ADMIN — METOPROLOL TARTRATE 50 MG: 50 TABLET, FILM COATED ORAL at 08:56

## 2022-06-25 RX ADMIN — METOPROLOL TARTRATE 50 MG: 50 TABLET, FILM COATED ORAL at 11:26

## 2022-06-25 RX ADMIN — INSULIN LISPRO 2 UNITS: 100 INJECTION, SOLUTION INTRAVENOUS; SUBCUTANEOUS at 12:22

## 2022-06-25 NOTE — OUTREACH NOTE
Prep Survey    Flowsheet Row Responses   [facility] facility patient discharged from? [facility]   Is LACE score < 7 ? No   Emergency Room discharge w/ pulse ox? No   Eligibility [facility]   Date of Admission 06/21/22   Date of Discharge 06/25/22   Discharge Disposition Home or Self Care   Discharge diagnosis sepsis, UTI, bacteremia, PNA, MRSA positive nares, ureteral stone obstruction, T2DM, CKD   Does the patient have one of the following disease processes/diagnoses(primary or secondary)? Sepsis   Does the patient have Home health ordered? No   Is there a DME ordered? No   Comments regarding appointments [facility] for IV abx   Prep survey completed? Yes          CAMILLA ROTH - Registered Nurse

## 2022-06-27 ENCOUNTER — TRANSITIONAL CARE MANAGEMENT TELEPHONE ENCOUNTER (OUTPATIENT)
Dept: CALL CENTER | Facility: HOSPITAL | Age: 87
End: 2022-06-27

## 2022-06-27 NOTE — OUTREACH NOTE
Call Center TCM Note    Flowsheet Row Responses   [facility] patient discharged from? [facility]   Does the patient have one of the following disease processes/diagnoses(primary or secondary)? Sepsis   TCM attempt successful? Yes  [No verbal release ]   Call start time [id number]   Call end time [id number]   Discharge diagnosis sepsis, UTI, bacteremia, PNA, MRSA positive nares, ureteral stone obstruction, T2DM, CKD   Meds reviewed with patient/caregiver? Yes   Is the patient having any side effects they believe may be caused by any medication additions or changes? No   Does the patient have all medications related to this admission filled (includes all antibiotics, inhalers, nebulizers,steroids,etc.) Yes   Is the patient taking all medications as directed (includes completed medication regime)? Yes   Does the patient have a primary care provider?  Yes   Does the patient have an appointment with their PCP within 7 days of discharge? No   What is preventing the patient from scheduling follow up appointments within 7 days of discharge? Earlier appointment not available   Nursing Interventions Advised patient to make appointment   Has the patient kept scheduled appointments due by today? N/A   Psychosocial issues? No   Did the patient receive a copy of their discharge instructions? Yes   Nursing interventions Reviewed instructions with patient   What is the patient's perception of their health status since discharge? Improving   Nursing interventions Nurse provided patient education   Is the patient/caregiver able to teach back Sepsis? S - Shivering,fever or very cold, S - Sleepy, difficult to arouse,confused, S - Short of breath   Nursing interventions Nurse provided patient education   Is patient/caregiver able to teach back steps to recovery at home? Set small, achievable goals for return to baseline health, Rest and regain strength, Eat a balanced diet   Is the patient/caregiver able to teach back signs and symptoms of  worsening condition: Hyperthermia, Fever, Shortness of breath/rapid respiratory rate, Altered mental status(confusion/coma)   If the patient is a current smoker, are they able to teach back resources for cessation? Not a smoker   Is the patient/caregiver able to teach back the hierarchy of who to call/visit for symptoms/problems? PCP, Specialist, Home health nurse, Urgent Care, ED, 911 Yes   TCM call completed? Yes          Aster Carvalho RN    6/27/2022, 15:01 EDT

## 2022-06-29 RX ORDER — METOPROLOL TARTRATE 100 MG/1
100 TABLET ORAL EVERY 12 HOURS SCHEDULED
Qty: 180 TABLET | Refills: 1 | Status: SHIPPED | OUTPATIENT
Start: 2022-06-29 | End: 2022-08-02 | Stop reason: SDUPTHER

## 2022-07-27 ENCOUNTER — OFFICE VISIT (OUTPATIENT)
Dept: INTERNAL MEDICINE | Facility: CLINIC | Age: 87
End: 2022-07-27

## 2022-07-27 VITALS
DIASTOLIC BLOOD PRESSURE: 88 MMHG | TEMPERATURE: 97.8 F | SYSTOLIC BLOOD PRESSURE: 138 MMHG | BODY MASS INDEX: 32.12 KG/M2 | HEART RATE: 92 BPM | WEIGHT: 257 LBS | RESPIRATION RATE: 20 BRPM

## 2022-07-27 DIAGNOSIS — E78.5 HYPERLIPIDEMIA, UNSPECIFIED HYPERLIPIDEMIA TYPE: ICD-10-CM

## 2022-07-27 DIAGNOSIS — E11.9 TYPE 2 DIABETES MELLITUS WITHOUT COMPLICATION, WITHOUT LONG-TERM CURRENT USE OF INSULIN: Primary | ICD-10-CM

## 2022-07-27 DIAGNOSIS — I10 ESSENTIAL HYPERTENSION: ICD-10-CM

## 2022-07-27 DIAGNOSIS — I48.0 PAROXYSMAL ATRIAL FIBRILLATION: ICD-10-CM

## 2022-07-27 DIAGNOSIS — K21.00 GASTROESOPHAGEAL REFLUX DISEASE WITH ESOPHAGITIS WITHOUT HEMORRHAGE: ICD-10-CM

## 2022-07-27 PROCEDURE — 99214 OFFICE O/P EST MOD 30 MIN: CPT | Performed by: INTERNAL MEDICINE

## 2022-07-27 PROCEDURE — 85025 COMPLETE CBC W/AUTO DIFF WBC: CPT | Performed by: INTERNAL MEDICINE

## 2022-07-27 PROCEDURE — 80061 LIPID PANEL: CPT | Performed by: INTERNAL MEDICINE

## 2022-07-27 PROCEDURE — 83036 HEMOGLOBIN GLYCOSYLATED A1C: CPT | Performed by: INTERNAL MEDICINE

## 2022-07-27 PROCEDURE — 80053 COMPREHEN METABOLIC PANEL: CPT | Performed by: INTERNAL MEDICINE

## 2022-07-27 PROCEDURE — 36415 COLL VENOUS BLD VENIPUNCTURE: CPT | Performed by: INTERNAL MEDICINE

## 2022-07-27 NOTE — PROGRESS NOTES
Chief Complaint   Patient presents with   • Follow-up     Follow up chronic medical problems       History of Present Illness    The patient presents for a follow-up related to Type 2 Diabetes Mellitus. He does not check his blood sugars at home. He denies hypoglycemic symptoms. The patient denies polyuria, polydypsia or polyphagia. He reports no symptoms of neuropathy. The patient takes his medication as prescribed. He is not taking insulin. The patient does check his feet daily at home. He denies chest pain, shortness of breath, orthopnea, paroxysmal nocturnal dyspnea, dyspnea on exertion, edema, palpitations or syncope.    The patient presents for a follow-up related to hypertension. The patient reports that he has had no headaches or blurred vision. He states that he is taking his medication as prescribed. He is not having medication side effects.    The patient presents for a follow-up related to hyperlipidemia. He is following a low fat diet. He reports that he is exercising. He is taking pravastatin. The patient is taking his medication as prescribed. He reports no medication side effects, including muscle cramps, abdominal pain, headaches or weakness.    The patient presents for a follow-up related to GERD. The patient is on omeprazole for his gastroesophageal reflux. The medication is taken on a regular basis and gives complete relief of the symptoms. He reports no abdominal pain, belching, diarrhea, dysphagia, early satiety, heartburn, hoarseness, nausea, odynophagia, rectal bleeding, vomiting or weight loss. The GERD has no known aggravating factors.    The patient presents for a follow-up related to chronic atrial fibrillation. He denies palpitations. The patient denies fatigue, dizziness or exercise intolerance. The patient denies using diet pills, decongestants, alcohol, caffeine, cocaine or stimulant medications.    Review of Systems    CONSTITUTIONAL- Denies Fever, Chills, Sweats, Weakness or  Malaise.    HENT- Denies Nasal Discharge, Sore Throat, Ear Pain, Ear Drainage, Sinus Pain, Nasal Congestion, Decreased Hearing or Tinnitus.    GASTROINTESTINAL- Denies Constipation.    PULMONARY- Denies Wheezing, Sputum Production, Cough, Hemoptysis or Pleuritic Chest Pain.    CARDIOVASCULAR- Denies Claudication or Irregular Heart Beat.    Medications      Current Outpatient Medications:   •  allopurinol (ZYLOPRIM) 300 MG tablet, Take 1 tablet by mouth Daily., Disp: 90 tablet, Rfl: 1  •  amiodarone (PACERONE) 100 MG tablet, Take 1 tablet by mouth Daily., Disp: 90 tablet, Rfl: 3  •  Ascorbic Acid (VITAMIN C) 500 MG capsule, Take 1 tablet by mouth 4 (four) times a day., Disp: , Rfl:   •  docusate sodium (COLACE) 100 MG capsule, Take 300 mg by mouth every night., Disp: , Rfl:   •  Eliquis 5 MG tablet tablet, TAKE 1 TABLET BY MOUTH EVERY 12 (TWELVE) HOURS., Disp: 180 tablet, Rfl: 3  •  Ferrous Gluconate (IRON) 240 (27 FE) MG tablet, Take 1 tablet by mouth daily., Disp: , Rfl:   •  finasteride (PROSCAR) 5 MG tablet, Take 1 tablet by mouth every night at bedtime., Disp: 90 tablet, Rfl: 1  •  hydroCHLOROthiazide (MICROZIDE) 12.5 MG capsule, Take 1 capsule by mouth Every Morning., Disp: 90 capsule, Rfl: 1  •  metFORMIN (GLUCOPHAGE) 1000 MG tablet, Take 1 tablet by mouth 2 (Two) Times a Day., Disp: 180 tablet, Rfl: 1  •  metoprolol tartrate (LOPRESSOR) 100 MG tablet, Take 1 tablet by mouth Every 12 (Twelve) Hours., Disp: 180 tablet, Rfl: 1  •  omeprazole (priLOSEC) 20 MG capsule, Take 1 capsule by mouth Daily., Disp: 90 capsule, Rfl: 1  •  pravastatin (PRAVACHOL) 40 MG tablet, Take 1 tablet by mouth Daily., Disp: 90 tablet, Rfl: 1  •  Probiotic Product (PROBIOTIC ADVANCED PO), Take 1 tablet by mouth 2 (Two) Times a Day., Disp: , Rfl:   •  tamsulosin (FLOMAX) 0.4 MG capsule 24 hr capsule, Take 1 capsule by mouth Daily., Disp: 90 capsule, Rfl: 1     Allergies    Allergies   Allergen Reactions   • Penicillins Hives     Has  tolerated cefepime, ceftriaxone   • Xarelto [Rivaroxaban]      GI bleed       Problem List    Patient Active Problem List   Diagnosis   • Atopic rhinitis   • Benign (familial) paraproteinemia   • Type 2 diabetes mellitus, without long-term current use of insulin (HCC)   • Enlarged prostate without lower urinary tract symptoms (luts)   • Gastroesophageal reflux disease with esophagitis   • Polyp of gallbladder   • Gout   • Hematuria   • Liver cyst   • Hyperlipidemia LDL goal <100   • Essential hypertension   • Nephrolithiasis   • Obstructive sleep apnea syndrome   • Paroxysmal atrial fibrillation (HCC)   • Stage 3 chronic kidney disease (HCC)   • Long term current use of antiarrhythmic medical therapy   • Hydronephrosis   • Hyponatremia   • Hypomagnesemia   • Acute UTI (urinary tract infection)   • E. coli sepsis (HCC)   • Bacteremia       Medications, Allergies, Problems List and Past History were reviewed and updated.    Physical Examination    /88   Pulse 92   Temp 97.8 °F (36.6 °C) (Infrared)   Resp 20   Wt 117 kg (257 lb)   BMI 32.12 kg/m²     HEENT: Facies- Within normal limits. Lids- Normal bilaterally. Conjunctiva- Clear bilaterally. Sclera- Anicteric bilaterally.    Neck: Thyroid- non enlarged, symmetric and has no nodules. No bruits are detected.    Lungs: Auscultation- Clear to auscultation bilaterally. There are no retractions, clubbing or cyanosis. The Expiratory to Inspiratory ratio is equal.    Cardiovascular: There are no carotid bruits. Heart- Normal Rate with Irregularly Irregular rhythm and no murmurs. There are no gallops. There are no rubs. In the lower extremities there is no edema. The upper extremities do not have edema.    Abdomen: Soft, benign, non-tender with no masses, hernias, organomegaly or scars.    Impression and Assessment    Type 2 Diabetes Mellitus without complication without insulin usage.    Essential Hypertension.    Hyperlipidemia.    Gastroesophageal Reflux  Disease.    Atrial Fibrillation.    Plan    Gastroesophageal Reflux Disease Plan: The patient was instructed to continue the current medications.    Essential Hypertension Plan: The patient was instructed to continue the current medications.    Hyperlipidemia Plan: The patient was instructed to exercise daily, eat a low fat diet and continue his medications.    Atrial Fibrillation Plan: The patient was instructed to continue the current medications.    Type 2 Diabetes Mellitus without complication without insulin usage Plan: The patient was instructed to continue the current medications.    Diagnoses and all orders for this visit:    1. Type 2 diabetes mellitus without complication, without long-term current use of insulin (HCC) (Primary)  -     Comprehensive Metabolic Panel; Future  -     Hemoglobin A1c; Future  -     Hemoglobin A1c  -     Comprehensive Metabolic Panel    2. Essential hypertension  -     CBC & Differential; Future  -     Comprehensive Metabolic Panel; Future  -     Comprehensive Metabolic Panel  -     CBC & Differential    3. Hyperlipidemia, unspecified hyperlipidemia type  -     Comprehensive Metabolic Panel; Future  -     Lipid Panel; Future  -     Lipid Panel  -     Comprehensive Metabolic Panel    4. Gastroesophageal reflux disease with esophagitis without hemorrhage    5. Paroxysmal atrial fibrillation (HCC)  -     Comprehensive Metabolic Panel; Future  -     Comprehensive Metabolic Panel        Return to Office    The patient was instructed to return for follow-up in 4 months. The patient was instructed to return sooner if the condition changes, worsens, or does not resolve.

## 2022-07-28 LAB
ALBUMIN SERPL-MCNC: 4.1 G/DL (ref 3.5–5.2)
ALBUMIN/GLOB SERPL: 1.7 G/DL
ALP SERPL-CCNC: 76 U/L (ref 39–117)
ALT SERPL W P-5'-P-CCNC: 19 U/L (ref 1–41)
ANION GAP SERPL CALCULATED.3IONS-SCNC: 12 MMOL/L (ref 5–15)
AST SERPL-CCNC: 20 U/L (ref 1–40)
BASOPHILS # BLD AUTO: 0.06 10*3/MM3 (ref 0–0.2)
BASOPHILS NFR BLD AUTO: 1 % (ref 0–1.5)
BILIRUB SERPL-MCNC: 0.6 MG/DL (ref 0–1.2)
BUN SERPL-MCNC: 17 MG/DL (ref 8–23)
BUN/CREAT SERPL: 18.7 (ref 7–25)
CALCIUM SPEC-SCNC: 9 MG/DL (ref 8.6–10.5)
CHLORIDE SERPL-SCNC: 99 MMOL/L (ref 98–107)
CHOLEST SERPL-MCNC: 89 MG/DL (ref 0–200)
CO2 SERPL-SCNC: 29 MMOL/L (ref 22–29)
CREAT SERPL-MCNC: 0.91 MG/DL (ref 0.76–1.27)
DEPRECATED RDW RBC AUTO: 48.3 FL (ref 37–54)
EGFRCR SERPLBLD CKD-EPI 2021: 82.6 ML/MIN/1.73
EOSINOPHIL # BLD AUTO: 0.13 10*3/MM3 (ref 0–0.4)
EOSINOPHIL NFR BLD AUTO: 2.1 % (ref 0.3–6.2)
ERYTHROCYTE [DISTWIDTH] IN BLOOD BY AUTOMATED COUNT: 14 % (ref 12.3–15.4)
GLOBULIN UR ELPH-MCNC: 2.4 GM/DL
GLUCOSE SERPL-MCNC: 100 MG/DL (ref 65–99)
HBA1C MFR BLD: 5.6 % (ref 4.8–5.6)
HCT VFR BLD AUTO: 41.9 % (ref 37.5–51)
HDLC SERPL-MCNC: 37 MG/DL (ref 40–60)
HGB BLD-MCNC: 13.7 G/DL (ref 13–17.7)
IMM GRANULOCYTES # BLD AUTO: 0.01 10*3/MM3 (ref 0–0.05)
IMM GRANULOCYTES NFR BLD AUTO: 0.2 % (ref 0–0.5)
LDLC SERPL CALC-MCNC: 36 MG/DL (ref 0–100)
LDLC/HDLC SERPL: 1 {RATIO}
LYMPHOCYTES # BLD AUTO: 1.56 10*3/MM3 (ref 0.7–3.1)
LYMPHOCYTES NFR BLD AUTO: 25.4 % (ref 19.6–45.3)
MCH RBC QN AUTO: 31.3 PG (ref 26.6–33)
MCHC RBC AUTO-ENTMCNC: 32.7 G/DL (ref 31.5–35.7)
MCV RBC AUTO: 95.7 FL (ref 79–97)
MONOCYTES # BLD AUTO: 0.56 10*3/MM3 (ref 0.1–0.9)
MONOCYTES NFR BLD AUTO: 9.1 % (ref 5–12)
NEUTROPHILS NFR BLD AUTO: 3.81 10*3/MM3 (ref 1.7–7)
NEUTROPHILS NFR BLD AUTO: 62.2 % (ref 42.7–76)
NRBC BLD AUTO-RTO: 0 /100 WBC (ref 0–0.2)
PLATELET # BLD AUTO: 184 10*3/MM3 (ref 140–450)
PMV BLD AUTO: 10.6 FL (ref 6–12)
POTASSIUM SERPL-SCNC: 4.2 MMOL/L (ref 3.5–5.2)
PROT SERPL-MCNC: 6.5 G/DL (ref 6–8.5)
RBC # BLD AUTO: 4.38 10*6/MM3 (ref 4.14–5.8)
SODIUM SERPL-SCNC: 140 MMOL/L (ref 136–145)
TRIGL SERPL-MCNC: 75 MG/DL (ref 0–150)
VLDLC SERPL-MCNC: 16 MG/DL (ref 5–40)
WBC NRBC COR # BLD: 6.13 10*3/MM3 (ref 3.4–10.8)

## 2022-08-02 ENCOUNTER — TELEPHONE (OUTPATIENT)
Dept: CARDIOLOGY | Facility: CLINIC | Age: 87
End: 2022-08-02

## 2022-08-02 DIAGNOSIS — R06.09 DYSPNEA ON EXERTION: ICD-10-CM

## 2022-08-02 DIAGNOSIS — I48.0 PAROXYSMAL ATRIAL FIBRILLATION: ICD-10-CM

## 2022-08-02 DIAGNOSIS — I10 ESSENTIAL HYPERTENSION: ICD-10-CM

## 2022-08-02 DIAGNOSIS — N18.31 STAGE 3A CHRONIC KIDNEY DISEASE: ICD-10-CM

## 2022-08-02 DIAGNOSIS — E78.5 HYPERLIPIDEMIA LDL GOAL <100: Primary | ICD-10-CM

## 2022-08-02 RX ORDER — METOPROLOL TARTRATE 50 MG/1
50 TABLET, FILM COATED ORAL EVERY 12 HOURS SCHEDULED
Qty: 60 TABLET | Refills: 0 | Status: SHIPPED | OUTPATIENT
Start: 2022-08-02 | End: 2022-08-11 | Stop reason: DRUGHIGH

## 2022-08-02 RX ORDER — AMIODARONE HYDROCHLORIDE 200 MG/1
200 TABLET ORAL 2 TIMES DAILY
Qty: 60 TABLET | Refills: 0 | Status: ON HOLD | OUTPATIENT
Start: 2022-08-02 | End: 2022-08-05 | Stop reason: SDUPTHER

## 2022-08-02 NOTE — TELEPHONE ENCOUNTER
"Patient's daughter called to report that the patient was having shortness of breath. I spoke with the patient and he says it is getting worse. He is unable to walk around the house without becoming, \"winded\". He doesn't have exact BP or HR readings but says they are both elevated. HR at PCP office visit 7/27/2022 was 95.    He says he has swelling in his legs but has actually lost weight. He confirms that he has not missed any doses of Eliquis.    He is taking:  amiodarone 100 mg daily  Metoprolol tartate 100 mg BID  HCTZ 12.5 mg daily  Eliquis 5 mg BID    He agrees to a cardioversion as mentioned in last hospital note. Any changes to meds in the meantime?    "

## 2022-08-02 NOTE — TELEPHONE ENCOUNTER
Spoke with the patient and he gave me verbal permission to speak with Columba, his daughter. His wife passed away 2 weeks ago and she has been handing his care. She verbalized understanding. She would like to be called for scheduling.

## 2022-08-02 NOTE — TELEPHONE ENCOUNTER
Increase amiodarone to 200 mg twice daily, reduce metoprolol dose to 50 mg twice daily, and schedule for external cardioversion.  Also needs echocardiogram, pharmacologic nuclear stress test, CMP, CBC, lipids, proBNP

## 2022-08-03 ENCOUNTER — PREP FOR SURGERY (OUTPATIENT)
Dept: OTHER | Facility: HOSPITAL | Age: 87
End: 2022-08-03

## 2022-08-04 ENCOUNTER — APPOINTMENT (OUTPATIENT)
Dept: CT IMAGING | Facility: HOSPITAL | Age: 87
End: 2022-08-04

## 2022-08-04 ENCOUNTER — APPOINTMENT (OUTPATIENT)
Dept: GENERAL RADIOLOGY | Facility: HOSPITAL | Age: 87
End: 2022-08-04

## 2022-08-04 ENCOUNTER — HOSPITAL ENCOUNTER (EMERGENCY)
Facility: HOSPITAL | Age: 87
Discharge: HOME | End: 2022-08-04
Attending: EMERGENCY MEDICINE | Admitting: EMERGENCY MEDICINE

## 2022-08-04 VITALS
SYSTOLIC BLOOD PRESSURE: 140 MMHG | TEMPERATURE: 98 F | HEART RATE: 108 BPM | WEIGHT: 247 LBS | HEIGHT: 75 IN | DIASTOLIC BLOOD PRESSURE: 99 MMHG | BODY MASS INDEX: 30.71 KG/M2 | OXYGEN SATURATION: 95 % | RESPIRATION RATE: 18 BRPM

## 2022-08-04 DIAGNOSIS — N39.0 ACUTE UTI: Primary | ICD-10-CM

## 2022-08-04 DIAGNOSIS — R10.9 ACUTE RIGHT FLANK PAIN: ICD-10-CM

## 2022-08-04 DIAGNOSIS — I48.91 ATRIAL FIBRILLATION WITH RVR: ICD-10-CM

## 2022-08-04 DIAGNOSIS — R06.02 SHORTNESS OF BREATH: ICD-10-CM

## 2022-08-04 LAB
ALBUMIN SERPL-MCNC: 3.6 G/DL (ref 3.5–5.2)
ALBUMIN/GLOB SERPL: 1.1 G/DL
ALP SERPL-CCNC: 98 U/L (ref 39–117)
ALT SERPL W P-5'-P-CCNC: 15 U/L (ref 1–41)
ANION GAP SERPL CALCULATED.3IONS-SCNC: 9 MMOL/L (ref 5–15)
AST SERPL-CCNC: 18 U/L (ref 1–40)
BACTERIA UR QL AUTO: ABNORMAL /HPF
BASOPHILS # BLD AUTO: 0.03 10*3/MM3 (ref 0–0.2)
BASOPHILS NFR BLD AUTO: 0.4 % (ref 0–1.5)
BILIRUB SERPL-MCNC: 1.1 MG/DL (ref 0–1.2)
BILIRUB UR QL STRIP: NEGATIVE
BUN SERPL-MCNC: 17 MG/DL (ref 8–23)
BUN/CREAT SERPL: 19.3 (ref 7–25)
CALCIUM SPEC-SCNC: 9.1 MG/DL (ref 8.6–10.5)
CHLORIDE SERPL-SCNC: 102 MMOL/L (ref 98–107)
CLARITY UR: ABNORMAL
CO2 SERPL-SCNC: 31 MMOL/L (ref 22–29)
COLOR UR: YELLOW
CREAT SERPL-MCNC: 0.88 MG/DL (ref 0.76–1.27)
D-LACTATE SERPL-SCNC: 1.6 MMOL/L (ref 0.5–2)
D-LACTATE SERPL-SCNC: 2.4 MMOL/L (ref 0.5–2)
DEPRECATED RDW RBC AUTO: 55.3 FL (ref 37–54)
EGFRCR SERPLBLD CKD-EPI 2021: 84.3 ML/MIN/1.73
EOSINOPHIL # BLD AUTO: 0.09 10*3/MM3 (ref 0–0.4)
EOSINOPHIL NFR BLD AUTO: 1.3 % (ref 0.3–6.2)
ERYTHROCYTE [DISTWIDTH] IN BLOOD BY AUTOMATED COUNT: 14.8 % (ref 12.3–15.4)
FLUAV SUBTYP SPEC NAA+PROBE: NOT DETECTED
FLUBV RNA ISLT QL NAA+PROBE: NOT DETECTED
GLOBULIN UR ELPH-MCNC: 3.2 GM/DL
GLUCOSE SERPL-MCNC: 124 MG/DL (ref 65–99)
GLUCOSE UR STRIP-MCNC: NEGATIVE MG/DL
HCT VFR BLD AUTO: 43.5 % (ref 37.5–51)
HGB BLD-MCNC: 13.5 G/DL (ref 13–17.7)
HGB UR QL STRIP.AUTO: ABNORMAL
HOLD SPECIMEN: NORMAL
HYALINE CASTS UR QL AUTO: ABNORMAL /LPF
IMM GRANULOCYTES # BLD AUTO: 0.04 10*3/MM3 (ref 0–0.05)
IMM GRANULOCYTES NFR BLD AUTO: 0.6 % (ref 0–0.5)
KETONES UR QL STRIP: NEGATIVE
LEUKOCYTE ESTERASE UR QL STRIP.AUTO: ABNORMAL
LYMPHOCYTES # BLD AUTO: 1.06 10*3/MM3 (ref 0.7–3.1)
LYMPHOCYTES NFR BLD AUTO: 15.8 % (ref 19.6–45.3)
MCH RBC QN AUTO: 31 PG (ref 26.6–33)
MCHC RBC AUTO-ENTMCNC: 31 G/DL (ref 31.5–35.7)
MCV RBC AUTO: 99.8 FL (ref 79–97)
MONOCYTES # BLD AUTO: 0.63 10*3/MM3 (ref 0.1–0.9)
MONOCYTES NFR BLD AUTO: 9.4 % (ref 5–12)
NEUTROPHILS NFR BLD AUTO: 4.85 10*3/MM3 (ref 1.7–7)
NEUTROPHILS NFR BLD AUTO: 72.5 % (ref 42.7–76)
NITRITE UR QL STRIP: NEGATIVE
NRBC BLD AUTO-RTO: 0 /100 WBC (ref 0–0.2)
NT-PROBNP SERPL-MCNC: 2079 PG/ML (ref 0–1800)
PH UR STRIP.AUTO: <=5 [PH] (ref 5–8)
PLATELET # BLD AUTO: 208 10*3/MM3 (ref 140–450)
PMV BLD AUTO: 10.5 FL (ref 6–12)
POTASSIUM SERPL-SCNC: 3.9 MMOL/L (ref 3.5–5.2)
PROCALCITONIN SERPL-MCNC: 0.03 NG/ML (ref 0–0.25)
PROT SERPL-MCNC: 6.8 G/DL (ref 6–8.5)
PROT UR QL STRIP: ABNORMAL
QT INTERVAL: 348 MS
QTC INTERVAL: 483 MS
RBC # BLD AUTO: 4.36 10*6/MM3 (ref 4.14–5.8)
RBC # UR STRIP: ABNORMAL /HPF
REF LAB TEST METHOD: ABNORMAL
SARS-COV-2 RNA PNL SPEC NAA+PROBE: NOT DETECTED
SODIUM SERPL-SCNC: 142 MMOL/L (ref 136–145)
SP GR UR STRIP: 1.02 (ref 1–1.03)
SQUAMOUS #/AREA URNS HPF: ABNORMAL /HPF
TROPONIN T SERPL-MCNC: <0.01 NG/ML (ref 0–0.03)
UROBILINOGEN UR QL STRIP: ABNORMAL
WBC # UR STRIP: ABNORMAL /HPF
WBC NRBC COR # BLD: 6.7 10*3/MM3 (ref 3.4–10.8)
WHOLE BLOOD HOLD COAG: NORMAL
WHOLE BLOOD HOLD SPECIMEN: NORMAL

## 2022-08-04 PROCEDURE — 87077 CULTURE AEROBIC IDENTIFY: CPT | Performed by: EMERGENCY MEDICINE

## 2022-08-04 PROCEDURE — 87186 SC STD MICRODIL/AGAR DIL: CPT | Performed by: EMERGENCY MEDICINE

## 2022-08-04 PROCEDURE — 96375 TX/PRO/DX INJ NEW DRUG ADDON: CPT

## 2022-08-04 PROCEDURE — 25010000002 IOPAMIDOL 61 % SOLUTION: Performed by: EMERGENCY MEDICINE

## 2022-08-04 PROCEDURE — 87086 URINE CULTURE/COLONY COUNT: CPT | Performed by: EMERGENCY MEDICINE

## 2022-08-04 PROCEDURE — 83880 ASSAY OF NATRIURETIC PEPTIDE: CPT

## 2022-08-04 PROCEDURE — 80053 COMPREHEN METABOLIC PANEL: CPT

## 2022-08-04 PROCEDURE — 83605 ASSAY OF LACTIC ACID: CPT | Performed by: EMERGENCY MEDICINE

## 2022-08-04 PROCEDURE — 81001 URINALYSIS AUTO W/SCOPE: CPT | Performed by: EMERGENCY MEDICINE

## 2022-08-04 PROCEDURE — 99284 EMERGENCY DEPT VISIT MOD MDM: CPT

## 2022-08-04 PROCEDURE — 25010000002 CEFTRIAXONE PER 250 MG: Performed by: EMERGENCY MEDICINE

## 2022-08-04 PROCEDURE — 84145 PROCALCITONIN (PCT): CPT | Performed by: EMERGENCY MEDICINE

## 2022-08-04 PROCEDURE — 25010000002 MORPHINE PER 10 MG: Performed by: EMERGENCY MEDICINE

## 2022-08-04 PROCEDURE — 93005 ELECTROCARDIOGRAM TRACING: CPT

## 2022-08-04 PROCEDURE — 87636 SARSCOV2 & INF A&B AMP PRB: CPT

## 2022-08-04 PROCEDURE — 85025 COMPLETE CBC W/AUTO DIFF WBC: CPT

## 2022-08-04 PROCEDURE — 96365 THER/PROPH/DIAG IV INF INIT: CPT

## 2022-08-04 PROCEDURE — 25010000002 ONDANSETRON PER 1 MG: Performed by: EMERGENCY MEDICINE

## 2022-08-04 PROCEDURE — 84484 ASSAY OF TROPONIN QUANT: CPT

## 2022-08-04 PROCEDURE — 74177 CT ABD & PELVIS W/CONTRAST: CPT

## 2022-08-04 PROCEDURE — 36415 COLL VENOUS BLD VENIPUNCTURE: CPT

## 2022-08-04 PROCEDURE — 71045 X-RAY EXAM CHEST 1 VIEW: CPT

## 2022-08-04 RX ORDER — MORPHINE SULFATE 4 MG/ML
4 INJECTION, SOLUTION INTRAMUSCULAR; INTRAVENOUS ONCE
Status: COMPLETED | OUTPATIENT
Start: 2022-08-04 | End: 2022-08-04

## 2022-08-04 RX ORDER — SODIUM CHLORIDE 0.9 % (FLUSH) 0.9 %
10 SYRINGE (ML) INJECTION AS NEEDED
Status: DISCONTINUED | OUTPATIENT
Start: 2022-08-04 | End: 2022-08-04 | Stop reason: HOSPADM

## 2022-08-04 RX ORDER — ONDANSETRON 2 MG/ML
4 INJECTION INTRAMUSCULAR; INTRAVENOUS ONCE
Status: COMPLETED | OUTPATIENT
Start: 2022-08-04 | End: 2022-08-04

## 2022-08-04 RX ORDER — CEFDINIR 300 MG/1
300 CAPSULE ORAL 2 TIMES DAILY
Qty: 14 CAPSULE | Refills: 0 | Status: SHIPPED | OUTPATIENT
Start: 2022-08-04 | End: 2022-08-30

## 2022-08-04 RX ORDER — HYDROCODONE BITARTRATE AND ACETAMINOPHEN 5; 325 MG/1; MG/1
1 TABLET ORAL EVERY 6 HOURS PRN
Qty: 10 TABLET | Refills: 0 | Status: ON HOLD | OUTPATIENT
Start: 2022-08-04 | End: 2022-09-29

## 2022-08-04 RX ADMIN — SODIUM CHLORIDE 1 G: 900 INJECTION INTRAVENOUS at 13:09

## 2022-08-04 RX ADMIN — MORPHINE SULFATE 4 MG: 4 INJECTION, SOLUTION INTRAMUSCULAR; INTRAVENOUS at 10:30

## 2022-08-04 RX ADMIN — ONDANSETRON 4 MG: 2 INJECTION INTRAMUSCULAR; INTRAVENOUS at 10:30

## 2022-08-04 RX ADMIN — IOPAMIDOL 85 ML: 612 INJECTION, SOLUTION INTRAVENOUS at 11:36

## 2022-08-05 ENCOUNTER — APPOINTMENT (OUTPATIENT)
Dept: CARDIOLOGY | Facility: HOSPITAL | Age: 87
End: 2022-08-05

## 2022-08-05 ENCOUNTER — HOSPITAL ENCOUNTER (OUTPATIENT)
Dept: CARDIOLOGY | Facility: HOSPITAL | Age: 87
Discharge: HOME | End: 2022-08-05
Attending: INTERNAL MEDICINE | Admitting: INTERNAL MEDICINE

## 2022-08-05 VITALS
HEIGHT: 75 IN | BODY MASS INDEX: 31.36 KG/M2 | WEIGHT: 252.21 LBS | TEMPERATURE: 97 F | OXYGEN SATURATION: 98 % | RESPIRATION RATE: 18 BRPM | HEART RATE: 58 BPM | DIASTOLIC BLOOD PRESSURE: 79 MMHG | SYSTOLIC BLOOD PRESSURE: 148 MMHG

## 2022-08-05 DIAGNOSIS — E78.5 HYPERLIPIDEMIA LDL GOAL <100: ICD-10-CM

## 2022-08-05 DIAGNOSIS — R06.09 DYSPNEA ON EXERTION: ICD-10-CM

## 2022-08-05 DIAGNOSIS — I50.32 CHRONIC DIASTOLIC CONGESTIVE HEART FAILURE: Primary | ICD-10-CM

## 2022-08-05 DIAGNOSIS — I48.0 PAROXYSMAL ATRIAL FIBRILLATION: ICD-10-CM

## 2022-08-05 DIAGNOSIS — I10 ESSENTIAL HYPERTENSION: ICD-10-CM

## 2022-08-05 DIAGNOSIS — N18.31 STAGE 3A CHRONIC KIDNEY DISEASE: ICD-10-CM

## 2022-08-05 PROBLEM — A41.51 E. COLI SEPSIS (HCC): Status: RESOLVED | Noted: 2022-06-23 | Resolved: 2022-08-05

## 2022-08-05 PROBLEM — R78.81 BACTEREMIA: Status: RESOLVED | Noted: 2022-06-24 | Resolved: 2022-08-05

## 2022-08-05 PROBLEM — E83.42 HYPOMAGNESEMIA: Status: RESOLVED | Noted: 2022-06-21 | Resolved: 2022-08-05

## 2022-08-05 PROBLEM — N39.0 ACUTE UTI (URINARY TRACT INFECTION): Status: RESOLVED | Noted: 2022-06-21 | Resolved: 2022-08-05

## 2022-08-05 LAB
GLUCOSE BLDC GLUCOMTR-MCNC: 99 MG/DL (ref 70–130)
MAXIMAL PREDICTED HEART RATE: 135 BPM
STRESS TARGET HR: 115 BPM

## 2022-08-05 PROCEDURE — 93306 TTE W/DOPPLER COMPLETE: CPT

## 2022-08-05 PROCEDURE — 92960 CARDIOVERSION ELECTRIC EXT: CPT | Performed by: INTERNAL MEDICINE

## 2022-08-05 PROCEDURE — 93306 TTE W/DOPPLER COMPLETE: CPT | Performed by: INTERNAL MEDICINE

## 2022-08-05 PROCEDURE — 93005 ELECTROCARDIOGRAM TRACING: CPT | Performed by: NURSE PRACTITIONER

## 2022-08-05 PROCEDURE — S0260 H&P FOR SURGERY: HCPCS | Performed by: NURSE PRACTITIONER

## 2022-08-05 PROCEDURE — 36415 COLL VENOUS BLD VENIPUNCTURE: CPT

## 2022-08-05 PROCEDURE — 82962 GLUCOSE BLOOD TEST: CPT

## 2022-08-05 PROCEDURE — 92960 CARDIOVERSION ELECTRIC EXT: CPT

## 2022-08-05 PROCEDURE — 93010 ELECTROCARDIOGRAM REPORT: CPT | Performed by: INTERNAL MEDICINE

## 2022-08-05 RX ORDER — MIDAZOLAM HYDROCHLORIDE 1 MG/ML
INJECTION INTRAMUSCULAR; INTRAVENOUS
Status: DISCONTINUED
Start: 2022-08-05 | End: 2022-08-05 | Stop reason: HOSPADM

## 2022-08-05 RX ORDER — NALOXONE HYDROCHLORIDE 0.4 MG/ML
INJECTION, SOLUTION INTRAMUSCULAR; INTRAVENOUS; SUBCUTANEOUS
Status: DISCONTINUED
Start: 2022-08-05 | End: 2022-08-05 | Stop reason: WASHOUT

## 2022-08-05 RX ORDER — FLUMAZENIL 0.1 MG/ML
INJECTION INTRAVENOUS
Status: DISCONTINUED
Start: 2022-08-05 | End: 2022-08-05 | Stop reason: WASHOUT

## 2022-08-05 RX ORDER — FENTANYL CITRATE 50 UG/ML
INJECTION, SOLUTION INTRAMUSCULAR; INTRAVENOUS
Status: DISCONTINUED
Start: 2022-08-05 | End: 2022-08-05 | Stop reason: WASHOUT

## 2022-08-05 RX ORDER — VALSARTAN 80 MG/1
80 TABLET ORAL DAILY
Qty: 90 TABLET | Refills: 3 | Status: SHIPPED | OUTPATIENT
Start: 2022-08-05 | End: 2022-08-11

## 2022-08-05 RX ORDER — AMIODARONE HYDROCHLORIDE 200 MG/1
200 TABLET ORAL 2 TIMES DAILY
Qty: 90 TABLET | Refills: 3 | Status: SHIPPED | OUTPATIENT
Start: 2022-08-05 | End: 2022-08-26 | Stop reason: SDUPTHER

## 2022-08-05 RX ORDER — FUROSEMIDE 20 MG/1
20 TABLET ORAL 2 TIMES DAILY
Qty: 90 TABLET | Refills: 1 | Status: SHIPPED | OUTPATIENT
Start: 2022-08-05 | End: 2022-08-11

## 2022-08-05 NOTE — ED PROVIDER NOTES
Subjective   Pt presents with right sided flank pain since yesterday. Worse with movement. Does not really hurt unless he moves.  No injury.  Dies fever, cough or any urinary symptoms.  He has had similar pain before with UTIs and wonders whether he has one.    Also notes some dyspnea developing over the last few days.  He has been dealing with AF for a little while now and has been seeing cardiology for it.  He is scheduled for what sounds like FARHAD/cardioversion tomorrow morning with Dr Oliveros.      History provided by:  Patient      Review of Systems   Constitutional: Negative for fever.   Respiratory: Positive for shortness of breath. Negative for cough.    Cardiovascular: Positive for palpitations. Negative for chest pain.   Gastrointestinal: Negative for abdominal distention and vomiting.   Genitourinary: Positive for flank pain. Negative for difficulty urinating.   All other systems reviewed and are negative.      Past Medical History:   Diagnosis Date   • Arrhythmia    • Atrial fibrillation (HCC)    • Chronic kidney disease    • Diabetes mellitus (HCC)    • GERD (gastroesophageal reflux disease)    • Gout    • History of colonoscopy 09/12/2012   • Hyperlipidemia    • Hypertension    • PAF (paroxysmal atrial fibrillation) (HCC)    • Prostatism    • Sleep apnea     CPAP HS       Allergies   Allergen Reactions   • Penicillins Hives     Has tolerated cefepime, ceftriaxone   • Xarelto [Rivaroxaban]      GI bleed       Past Surgical History:   Procedure Laterality Date   • CARDIOVERSION     • CATARACT EXTRACTION Left    • KIDNEY STONE SURGERY         Family History   Problem Relation Age of Onset   • Alzheimer's disease Mother    • Cancer Father    • COPD Father    • No Known Problems Daughter    • No Known Problems Daughter    • No Known Problems Daughter        Social History     Socioeconomic History   • Marital status:    Tobacco Use   • Smoking status: Former Smoker     Quit date: 5/16/1960     Years  since quittin.2   • Smokeless tobacco: Never Used   • Tobacco comment: started at 13 yo.   Substance and Sexual Activity   • Alcohol use: No   • Drug use: Never   • Sexual activity: Defer           Objective   Physical Exam  Vitals and nursing note reviewed.   Constitutional:       General: He is not in acute distress.     Appearance: Normal appearance. He is not ill-appearing.   HENT:      Head: Normocephalic and atraumatic.      Mouth/Throat:      Mouth: Mucous membranes are moist.   Eyes:      General: No scleral icterus.        Right eye: No discharge.         Left eye: No discharge.      Conjunctiva/sclera: Conjunctivae normal.   Cardiovascular:      Rate and Rhythm: Tachycardia present. Rhythm irregular.      Heart sounds: No murmur heard.  Pulmonary:      Effort: Pulmonary effort is normal. No respiratory distress.      Breath sounds: Normal breath sounds. No wheezing.   Abdominal:      General: Bowel sounds are normal. There is no distension.      Palpations: Abdomen is soft.      Tenderness: There is no abdominal tenderness. There is no guarding or rebound.   Musculoskeletal:         General: No swelling. Normal range of motion.      Cervical back: Normal range of motion and neck supple.      Comments: Right sided low back tenderness below the costal margin and above the pelvic brim.   Skin:     General: Skin is warm and dry.      Findings: No rash.   Neurological:      General: No focal deficit present.      Mental Status: He is alert. Mental status is at baseline.   Psychiatric:         Mood and Affect: Mood normal.         Behavior: Behavior normal.         Thought Content: Thought content normal.         Procedures           ED Course         CT negative for acute process.  UA positive for infection.  EKG AF RVR.  Labs benign.    He is stable with AF, symptomatic but mildly.  Has scheduled treatment tomorrow. Will treat urine.    Patient stable on serial rechecks.  Discussed findings, concerns,  plan of care, expected course, reasons to return and followup.  Provided the opportunity to ask questions.                            AMEE reviewed by Oneal Beckwith MD       MDM  Number of Diagnoses or Management Options     Amount and/or Complexity of Data Reviewed  Clinical lab tests: ordered and reviewed  Tests in the radiology section of CPT®: ordered and reviewed  Independent visualization of images, tracings, or specimens: yes        Final diagnoses:   Acute UTI   Acute right flank pain   Shortness of breath   Atrial fibrillation with RVR (HCC)       ED Disposition  ED Disposition     ED Disposition   Discharge    Condition   Stable    Comment   --             Pito Way MD  [address] [facility]  [address]  [facility] [address]  [phone]    In 2 days      Titus Oliveros IV, MD  [address] [facility] [address]  [place], [facility] within [facility] [address]  [phone]    In 1 day  As scheduled    Nickolas Rios MD  [address] [facility]  [address]  [place] [id number]  [phone]    Call            Medication List      New Prescriptions    cefdinir 300 MG capsule  Commonly known as: OMNICEF  Take 1 capsule by mouth 2 (Two) Times a Day.     HYDROcodone-acetaminophen 5-325 MG per tablet  Commonly known as: NORCO  Take 1 tablet by mouth Every 6 (Six) Hours As Needed for Moderate Pain .           Where to Get Your Medications      These medications were sent to [facility] [address] [place] [address] - [address] - [phone]  - [phone] FX  [address] [place], [place] [id number]    Phone: [phone]   · cefdinir 300 MG capsule  · HYDROcodone-acetaminophen 5-325 MG per tablet          Oneal Beckwith MD  08/04/22 2100

## 2022-08-05 NOTE — H&P
[facility] Consult/H&P Note      Referring Provider: Titus Oliveros*  Primary Provider:  Pito Way MD  Reason for Consultation: Afib    PROBLEM LIST:  • Paroxysmal atrial fibrillation (HCC) [I48.0]   Yes       Priority: High       · Diagnosed August 2012.   · FARHAD-guided ECV, 8/17/2012  · CHADS-VASc 5 (age > 75, CAD, HTN, DM)  · Successful external cardioversion for asymptomatic atrial fibrillation with RVR, 10/25/18  · Successful cardioversion for atrial fibrillation RVR, 3/6/19.  Sotalol increased  · Minimally symptomatic atrial fibrillation with cardioversion and the ER, 10/31/2019  · ED cardioversion for A. fib with RVR, 7/21/2020  · ED cardioversion for asymptomatic A. fib with RVR, 12/27/2020   · ED cardioversion for asymptomatic A. fib with RVR x 2  occasions, March 2021  · Transition from sotalol to amiodarone, 4/14/2021  · Successful FARHAD/ECV May 3, 2021  · Recurrent Afib 6/2022  · Amiodarone increased to 200 mg, metoprolol decreased to 50 mg bid, 8/2/22      Essential hypertension [I10]   Yes       Priority: Medium       · Target blood pressure <130/80 mmHg      • Stage 3 chronic kidney disease (HCC) [N18.30]        Obstructive sleep apnea syndrome [G47.33]   Yes       · Compliant with CPAP         HISTORY OF PRESENT ILLNESS:  Darien Camarena is a 86 y.o. male with the above noted pmhx who presents today for ECV. He has a known history of persistent atrial fibrillation, s/p several cardioversions in the past. Failed Sotalol due to recurrent atrial fibrillation. Now maintained on amiodarone. Amio was increased to 200 mg daily on 8/2 after patient called with complaints of SOA/JIMENES. He denies missing any doses of Eliquis in the past 30 days.     REVIEW OF SYSTEMS  Pertinent positives are listed in the HPI and all other ROS are negative.      SOCIAL HISTORY:   reports that he quit smoking about 62 years ago. He has never used smokeless tobacco. He reports that he does not drink  "alcohol and does not use drugs.     FAMILY HISTORY:  family history includes Alzheimer's disease in his mother; COPD in his father; Cancer in his father; No Known Problems in his daughter, daughter, and daughter.     CURRENT MEDICATIONS:    No current facility-administered medications for this encounter.     Allergies:  Penicillins and Xarelto [rivaroxaban]    Objective     Vital Sign Min/Max for last 24 hours  Temp  Min: 97 °F (36.1 °C)  Max: 98 °F (36.7 °C)   BP  Min: 139/98  Max: 155/116   Pulse  Min: 96  Max: 123   Resp  Min: 16  Max: 20   SpO2  Min: 91 %  Max: 96 %   No data recorded   Weight  Min: 112 kg (247 lb)  Max: 114 kg (252 lb 3.3 oz)     Flowsheet Rows    Flowsheet Row First Filed Value   Admission Height 190.5 cm (75\") Documented at 08/05/2022 0757   Admission Weight 114 kg (252 lb 3.3 oz) Documented at 08/05/2022 0757          PHYSICAL EXAM:  GENERAL: This is a well-developed, well-nourished, male who is in no acute distress.   SKIN: Pink and warm without rash   HEENT: Head is normocephalic and atraumatic.   NECK: There is no jugular venous distention at 30°.  LUNGS: Crackles. No rhonchi or wheezes  CARDIOVASCULAR:Irregularly irregular, no R/M/G.   ABDOMEN: Soft and nondistended with positive bowel sounds x4.   NEUROLOGICAL: Nonfocal. Alert and oriented x3.   PERIPHERAL VASCULAR: Posterior tibial and dorsalis pedis pulses are 2+ and symmetrical. There is 2+ peripheral edema.      EKG:    Tele: Afib 100's    LABS:      Lab Results   Component Value Date    WBC 6.70 08/04/2022    HGB 13.5 08/04/2022    HCT 43.5 08/04/2022    MCV 99.8 (H) 08/04/2022     08/04/2022     Lab Results   Component Value Date    GLUCOSE 124 (H) 08/04/2022    BUN 17 08/04/2022    CREATININE 0.88 08/04/2022    EGFRIFNONA 79 12/16/2021    BCR 19.3 08/04/2022    K 3.9 08/04/2022    CO2 31.0 (H) 08/04/2022    CALCIUM 9.1 08/04/2022    ALBUMIN 3.60 08/04/2022    AST 18 08/04/2022    ALT 15 08/04/2022     Lab Results "   Component Value Date    TROPONINT <0.010 08/04/2022     Lab Results   Component Value Date    INR 1.92 (H) 06/21/2022    PROTIME 22.0 (H) 06/21/2022     Lab Results   Component Value Date    CHOL 89 07/27/2022    TRIG 75 07/27/2022    HDL 37 (L) 07/27/2022    LDL 36 07/27/2022        Results Review: I reviewed the patient's new clinical results.      ASSESSMENT:    1.  Persistent atrial fibrillation  a. S/p several successful ECVs in the past  b. Failed Sotalol d/t recurrent Afib  c. Developed SOA/JIMENES, recurrent Afib for which amio was increased to 200 mg on 8/2/22.  Patient presents today for ECV. The risks, benefits, and alternatives of the procedure have been reviewed and the patient wishes to proceed.       2. JIMENES  - patient will have echo today.  - outpatient stress test  - reviewed recent ProBNP which is mildly elevated; CXR 8/4/22-  Mild pulmonary edema.      PLAN:    - Proceed with ECV  - may need diuresis      Electronically signed by BERYL Cehry, 08/05/22, 8:12 AM EDT.

## 2022-08-06 LAB — BACTERIA SPEC AEROBE CULT: ABNORMAL

## 2022-08-07 ENCOUNTER — TELEPHONE (OUTPATIENT)
Dept: EMERGENCY DEPT | Facility: HOSPITAL | Age: 87
End: 2022-08-07

## 2022-08-07 NOTE — TELEPHONE ENCOUNTER
Patient's urine culture reveals detection of Klebsiella not susceptible to cefdinir which was prescribed for the patient during his recent ED visit.  Called to the patient followed by his first emergency contact unsuccessful.  I spoke to the second emergency contact, Columba who is his daughter.  She was very helpful.  She is a nurse practitioner and understands the implications of resistant urinary tract infection.  She does volunteer that her father had a elective cardioversion performed on Friday here on campus and has been recovering very well.  His other caregiver sisters have reported that he is well-appearing.  He attended a family reunion today.  He has an active relationship with Dr. Og with infectious disease.  Because he is feeling well, she will make definite contact with Dr. Naylor office at 8 AM to facilitate close follow-up as an outpatient.

## 2022-08-08 LAB
BH CV ECHO MEAS - AO MAX PG: 6 MMHG
BH CV ECHO MEAS - AO MEAN PG: 3.1 MMHG
BH CV ECHO MEAS - AO ROOT DIAM: 3.6 CM
BH CV ECHO MEAS - AO V2 MAX: 122.2 CM/SEC
BH CV ECHO MEAS - AO V2 VTI: 28 CM
BH CV ECHO MEAS - AVA(I,D): 2.03 CM2
BH CV ECHO MEAS - EDV(CUBED): 150.1 ML
BH CV ECHO MEAS - EDV(MOD-SP2): 122 ML
BH CV ECHO MEAS - EDV(MOD-SP4): 119 ML
BH CV ECHO MEAS - EF(MOD-BP): 38 %
BH CV ECHO MEAS - EF(MOD-SP2): 37.1 %
BH CV ECHO MEAS - EF(MOD-SP4): 33.5 %
BH CV ECHO MEAS - ESV(CUBED): 95.7 ML
BH CV ECHO MEAS - ESV(MOD-SP2): 76.7 ML
BH CV ECHO MEAS - ESV(MOD-SP4): 79.1 ML
BH CV ECHO MEAS - FS: 13.9 %
BH CV ECHO MEAS - IVS/LVPW: 0.81 CM
BH CV ECHO MEAS - IVSD: 0.77 CM
BH CV ECHO MEAS - LA DIMENSION: 4.5 CM
BH CV ECHO MEAS - LAT PEAK E' VEL: 11.2 CM/SEC
BH CV ECHO MEAS - LV DIASTOLIC VOL/BSA (35-75): 49.8 CM2
BH CV ECHO MEAS - LV MASS(C)D: 165 GRAMS
BH CV ECHO MEAS - LV MAX PG: 2.6 MMHG
BH CV ECHO MEAS - LV MEAN PG: 1.18 MMHG
BH CV ECHO MEAS - LV SYSTOLIC VOL/BSA (12-30): 33.1 CM2
BH CV ECHO MEAS - LV V1 MAX: 81 CM/SEC
BH CV ECHO MEAS - LV V1 VTI: 16.8 CM
BH CV ECHO MEAS - LVIDD: 5.3 CM
BH CV ECHO MEAS - LVIDS: 4.6 CM
BH CV ECHO MEAS - LVOT AREA: 3.4 CM2
BH CV ECHO MEAS - LVOT DIAM: 2.07 CM
BH CV ECHO MEAS - LVPWD: 0.95 CM
BH CV ECHO MEAS - MED PEAK E' VEL: 8.3 CM/SEC
BH CV ECHO MEAS - MV A MAX VEL: 25.2 CM/SEC
BH CV ECHO MEAS - MV DEC SLOPE: 477.3 CM/SEC2
BH CV ECHO MEAS - MV DEC TIME: 0.17 MSEC
BH CV ECHO MEAS - MV E MAX VEL: 110 CM/SEC
BH CV ECHO MEAS - MV E/A: 4.4
BH CV ECHO MEAS - MV P1/2T: 64.1 MSEC
BH CV ECHO MEAS - MVA(P1/2T): 3.4 CM2
BH CV ECHO MEAS - PA ACC TIME: 0.19 SEC
BH CV ECHO MEAS - PA PR(ACCEL): -8.1 MMHG
BH CV ECHO MEAS - PI END-D VEL: 161.5 CM/SEC
BH CV ECHO MEAS - SI(MOD-SP2): 19 ML/M2
BH CV ECHO MEAS - SI(MOD-SP4): 16.7 ML/M2
BH CV ECHO MEAS - SV(LVOT): 56.9 ML
BH CV ECHO MEAS - SV(MOD-SP2): 45.3 ML
BH CV ECHO MEAS - SV(MOD-SP4): 39.9 ML
BH CV ECHO MEAS - TAPSE (>1.6): 2.08 CM
BH CV ECHO MEASUREMENTS AVERAGE E/E' RATIO: 11.28
LEFT ATRIUM VOLUME INDEX: 46.4 ML/M2
LV EF 2D ECHO EST: 50 %
MAXIMAL PREDICTED HEART RATE: 135 BPM
STRESS TARGET HR: 115 BPM

## 2022-08-09 ENCOUNTER — TRANSCRIBE ORDERS (OUTPATIENT)
Dept: LAB | Facility: HOSPITAL | Age: 87
End: 2022-08-09

## 2022-08-09 ENCOUNTER — LAB (OUTPATIENT)
Dept: LAB | Facility: HOSPITAL | Age: 87
End: 2022-08-09

## 2022-08-09 DIAGNOSIS — A41.51 SEPTICEMIA DUE TO E. COLI: ICD-10-CM

## 2022-08-09 DIAGNOSIS — N13.30 HYDRONEPHROSIS, UNSPECIFIED HYDRONEPHROSIS TYPE: ICD-10-CM

## 2022-08-09 DIAGNOSIS — E13.9 DIABETES MELLITUS OF OTHER TYPE WITHOUT COMPLICATION, UNSPECIFIED WHETHER LONG TERM INSULIN USE: ICD-10-CM

## 2022-08-09 DIAGNOSIS — B96.1 BACTEREMIA DUE TO KLEBSIELLA PNEUMONIAE: ICD-10-CM

## 2022-08-09 DIAGNOSIS — B96.1 BACTEREMIA DUE TO KLEBSIELLA PNEUMONIAE: Primary | ICD-10-CM

## 2022-08-09 DIAGNOSIS — I10 ESSENTIAL HYPERTENSION, MALIGNANT: ICD-10-CM

## 2022-08-09 DIAGNOSIS — R78.81 BACTEREMIA DUE TO KLEBSIELLA PNEUMONIAE: Primary | ICD-10-CM

## 2022-08-09 DIAGNOSIS — R78.81 BACTEREMIA DUE TO KLEBSIELLA PNEUMONIAE: ICD-10-CM

## 2022-08-09 LAB
ANION GAP SERPL CALCULATED.3IONS-SCNC: 11 MMOL/L (ref 5–15)
BUN SERPL-MCNC: 15 MG/DL (ref 8–23)
BUN/CREAT SERPL: 15.3 (ref 7–25)
CALCIUM SPEC-SCNC: 8.8 MG/DL (ref 8.6–10.5)
CHLORIDE SERPL-SCNC: 96 MMOL/L (ref 98–107)
CO2 SERPL-SCNC: 34 MMOL/L (ref 22–29)
CREAT SERPL-MCNC: 0.98 MG/DL (ref 0.76–1.27)
EGFRCR SERPLBLD CKD-EPI 2021: 75.6 ML/MIN/1.73
GLUCOSE SERPL-MCNC: 89 MG/DL (ref 65–99)
POTASSIUM SERPL-SCNC: 3.4 MMOL/L (ref 3.5–5.2)
SODIUM SERPL-SCNC: 141 MMOL/L (ref 136–145)

## 2022-08-09 PROCEDURE — 36415 COLL VENOUS BLD VENIPUNCTURE: CPT

## 2022-08-09 PROCEDURE — 80048 BASIC METABOLIC PNL TOTAL CA: CPT

## 2022-08-11 ENCOUNTER — OFFICE VISIT (OUTPATIENT)
Dept: INTERNAL MEDICINE | Facility: CLINIC | Age: 87
End: 2022-08-11

## 2022-08-11 VITALS
DIASTOLIC BLOOD PRESSURE: 64 MMHG | BODY MASS INDEX: 30 KG/M2 | WEIGHT: 240 LBS | SYSTOLIC BLOOD PRESSURE: 138 MMHG | TEMPERATURE: 97.7 F | HEART RATE: 68 BPM | RESPIRATION RATE: 18 BRPM

## 2022-08-11 DIAGNOSIS — N39.0 ACUTE UTI: Primary | ICD-10-CM

## 2022-08-11 PROCEDURE — 99213 OFFICE O/P EST LOW 20 MIN: CPT | Performed by: INTERNAL MEDICINE

## 2022-08-11 RX ORDER — METOPROLOL TARTRATE 100 MG/1
100 TABLET ORAL 2 TIMES DAILY
COMMUNITY
End: 2022-11-23

## 2022-08-11 RX ORDER — HYDROCHLOROTHIAZIDE 12.5 MG/1
12.5 TABLET ORAL DAILY
Status: ON HOLD | COMMUNITY
End: 2022-09-29

## 2022-08-15 ENCOUNTER — LAB (OUTPATIENT)
Dept: LAB | Facility: HOSPITAL | Age: 87
End: 2022-08-15

## 2022-08-15 DIAGNOSIS — I10 ESSENTIAL HYPERTENSION: ICD-10-CM

## 2022-08-15 DIAGNOSIS — I50.32 CHRONIC DIASTOLIC CONGESTIVE HEART FAILURE: ICD-10-CM

## 2022-08-15 LAB
ANION GAP SERPL CALCULATED.3IONS-SCNC: 8 MMOL/L (ref 5–15)
BUN SERPL-MCNC: 18 MG/DL (ref 8–23)
BUN/CREAT SERPL: 16.7 (ref 7–25)
CALCIUM SPEC-SCNC: 8.5 MG/DL (ref 8.6–10.5)
CHLORIDE SERPL-SCNC: 99 MMOL/L (ref 98–107)
CO2 SERPL-SCNC: 34 MMOL/L (ref 22–29)
CREAT SERPL-MCNC: 1.08 MG/DL (ref 0.76–1.27)
EGFRCR SERPLBLD CKD-EPI 2021: 67.2 ML/MIN/1.73
GLUCOSE SERPL-MCNC: 92 MG/DL (ref 65–99)
POTASSIUM SERPL-SCNC: 3.3 MMOL/L (ref 3.5–5.2)
SODIUM SERPL-SCNC: 141 MMOL/L (ref 136–145)

## 2022-08-15 PROCEDURE — 36415 COLL VENOUS BLD VENIPUNCTURE: CPT

## 2022-08-15 PROCEDURE — 80048 BASIC METABOLIC PNL TOTAL CA: CPT

## 2022-08-20 ENCOUNTER — LAB (OUTPATIENT)
Dept: LAB | Facility: HOSPITAL | Age: 87
End: 2022-08-20

## 2022-08-20 ENCOUNTER — TRANSCRIBE ORDERS (OUTPATIENT)
Dept: LAB | Facility: HOSPITAL | Age: 87
End: 2022-08-20

## 2022-08-20 DIAGNOSIS — I10 ESSENTIAL HYPERTENSION: ICD-10-CM

## 2022-08-20 DIAGNOSIS — I48.0 PAROXYSMAL ATRIAL FIBRILLATION: Chronic | ICD-10-CM

## 2022-08-20 DIAGNOSIS — N13.30 HYDRONEPHROSIS, UNSPECIFIED HYDRONEPHROSIS TYPE: ICD-10-CM

## 2022-08-20 DIAGNOSIS — N18.31 STAGE 3A CHRONIC KIDNEY DISEASE: ICD-10-CM

## 2022-08-20 DIAGNOSIS — E11.9 DIABETES MELLITUS WITHOUT COMPLICATION: ICD-10-CM

## 2022-08-20 DIAGNOSIS — B96.1 BACTEREMIA DUE TO KLEBSIELLA PNEUMONIAE: Primary | ICD-10-CM

## 2022-08-20 DIAGNOSIS — A41.51 SEPTICEMIA DUE TO E. COLI: ICD-10-CM

## 2022-08-20 DIAGNOSIS — R78.81 BACTEREMIA DUE TO KLEBSIELLA PNEUMONIAE: ICD-10-CM

## 2022-08-20 DIAGNOSIS — E78.5 HYPERLIPIDEMIA LDL GOAL <100: ICD-10-CM

## 2022-08-20 DIAGNOSIS — R06.09 DYSPNEA ON EXERTION: ICD-10-CM

## 2022-08-20 DIAGNOSIS — B96.1 BACTEREMIA DUE TO KLEBSIELLA PNEUMONIAE: ICD-10-CM

## 2022-08-20 DIAGNOSIS — R78.81 BACTEREMIA DUE TO KLEBSIELLA PNEUMONIAE: Primary | ICD-10-CM

## 2022-08-20 LAB
ALBUMIN SERPL-MCNC: 3.5 G/DL (ref 3.5–5.2)
ALBUMIN/GLOB SERPL: 1 G/DL
ALP SERPL-CCNC: 108 U/L (ref 39–117)
ALT SERPL W P-5'-P-CCNC: 38 U/L (ref 1–41)
ANION GAP SERPL CALCULATED.3IONS-SCNC: 7 MMOL/L (ref 5–15)
AST SERPL-CCNC: 51 U/L (ref 1–40)
BASOPHILS # BLD AUTO: 0.04 10*3/MM3 (ref 0–0.2)
BASOPHILS NFR BLD AUTO: 0.7 % (ref 0–1.5)
BILIRUB SERPL-MCNC: 0.6 MG/DL (ref 0–1.2)
BUN SERPL-MCNC: 15 MG/DL (ref 8–23)
BUN/CREAT SERPL: 13.9 (ref 7–25)
CALCIUM SPEC-SCNC: 8.6 MG/DL (ref 8.6–10.5)
CHLORIDE SERPL-SCNC: 100 MMOL/L (ref 98–107)
CHOLEST SERPL-MCNC: 89 MG/DL (ref 0–200)
CO2 SERPL-SCNC: 34 MMOL/L (ref 22–29)
CREAT SERPL-MCNC: 1.08 MG/DL (ref 0.76–1.27)
CRP SERPL-MCNC: 0.3 MG/DL (ref 0–0.5)
DEPRECATED RDW RBC AUTO: 48.8 FL (ref 37–54)
EGFRCR SERPLBLD CKD-EPI 2021: 67.2 ML/MIN/1.73
EOSINOPHIL # BLD AUTO: 0.22 10*3/MM3 (ref 0–0.4)
EOSINOPHIL NFR BLD AUTO: 3.7 % (ref 0.3–6.2)
ERYTHROCYTE [DISTWIDTH] IN BLOOD BY AUTOMATED COUNT: 13.8 % (ref 12.3–15.4)
ERYTHROCYTE [SEDIMENTATION RATE] IN BLOOD: 37 MM/HR (ref 0–20)
GLOBULIN UR ELPH-MCNC: 3.4 GM/DL
GLUCOSE SERPL-MCNC: 98 MG/DL (ref 65–99)
HCT VFR BLD AUTO: 44 % (ref 37.5–51)
HDLC SERPL-MCNC: 36 MG/DL (ref 40–60)
HGB BLD-MCNC: 14 G/DL (ref 13–17.7)
IMM GRANULOCYTES # BLD AUTO: 0.02 10*3/MM3 (ref 0–0.05)
IMM GRANULOCYTES NFR BLD AUTO: 0.3 % (ref 0–0.5)
LDLC SERPL CALC-MCNC: 37 MG/DL (ref 0–100)
LDLC/HDLC SERPL: 1.07 {RATIO}
LYMPHOCYTES # BLD AUTO: 1.43 10*3/MM3 (ref 0.7–3.1)
LYMPHOCYTES NFR BLD AUTO: 24.3 % (ref 19.6–45.3)
MCH RBC QN AUTO: 30.6 PG (ref 26.6–33)
MCHC RBC AUTO-ENTMCNC: 31.8 G/DL (ref 31.5–35.7)
MCV RBC AUTO: 96.1 FL (ref 79–97)
MONOCYTES # BLD AUTO: 0.45 10*3/MM3 (ref 0.1–0.9)
MONOCYTES NFR BLD AUTO: 7.6 % (ref 5–12)
NEUTROPHILS NFR BLD AUTO: 3.73 10*3/MM3 (ref 1.7–7)
NEUTROPHILS NFR BLD AUTO: 63.4 % (ref 42.7–76)
NRBC BLD AUTO-RTO: 0 /100 WBC (ref 0–0.2)
PLATELET # BLD AUTO: 205 10*3/MM3 (ref 140–450)
PMV BLD AUTO: 10.5 FL (ref 6–12)
POTASSIUM SERPL-SCNC: 3.9 MMOL/L (ref 3.5–5.2)
PROT SERPL-MCNC: 6.9 G/DL (ref 6–8.5)
RBC # BLD AUTO: 4.58 10*6/MM3 (ref 4.14–5.8)
SODIUM SERPL-SCNC: 141 MMOL/L (ref 136–145)
TRIGL SERPL-MCNC: 73 MG/DL (ref 0–150)
VLDLC SERPL-MCNC: 16 MG/DL (ref 5–40)
WBC NRBC COR # BLD: 5.89 10*3/MM3 (ref 3.4–10.8)

## 2022-08-20 PROCEDURE — 85652 RBC SED RATE AUTOMATED: CPT

## 2022-08-20 PROCEDURE — 80061 LIPID PANEL: CPT

## 2022-08-20 PROCEDURE — 36415 COLL VENOUS BLD VENIPUNCTURE: CPT

## 2022-08-20 PROCEDURE — 86140 C-REACTIVE PROTEIN: CPT

## 2022-08-20 PROCEDURE — 80053 COMPREHEN METABOLIC PANEL: CPT

## 2022-08-20 PROCEDURE — 85025 COMPLETE CBC W/AUTO DIFF WBC: CPT

## 2022-08-21 ENCOUNTER — TRANSCRIBE ORDERS (OUTPATIENT)
Dept: LAB | Facility: HOSPITAL | Age: 87
End: 2022-08-21

## 2022-08-21 DIAGNOSIS — R78.81 BACTEREMIA DUE TO KLEBSIELLA PNEUMONIAE: Primary | ICD-10-CM

## 2022-08-21 DIAGNOSIS — B96.1 BACTEREMIA DUE TO KLEBSIELLA PNEUMONIAE: Primary | ICD-10-CM

## 2022-08-21 DIAGNOSIS — E11.9 DIABETES MELLITUS WITHOUT COMPLICATION: ICD-10-CM

## 2022-08-21 DIAGNOSIS — N13.30 HYDRONEPHROSIS, UNSPECIFIED HYDRONEPHROSIS TYPE: ICD-10-CM

## 2022-08-21 DIAGNOSIS — A41.51 SEPSIS DUE TO ESCHERICHIA COLI, UNSPECIFIED WHETHER ACUTE ORGAN DYSFUNCTION PRESENT: ICD-10-CM

## 2022-08-26 DIAGNOSIS — I48.0 PAROXYSMAL ATRIAL FIBRILLATION: ICD-10-CM

## 2022-08-26 RX ORDER — AMIODARONE HYDROCHLORIDE 200 MG/1
200 TABLET ORAL 2 TIMES DAILY
Qty: 90 TABLET | Refills: 1 | Status: SHIPPED | OUTPATIENT
Start: 2022-08-26 | End: 2022-08-30

## 2022-08-30 ENCOUNTER — OFFICE VISIT (OUTPATIENT)
Dept: CARDIOLOGY | Facility: CLINIC | Age: 87
End: 2022-08-30

## 2022-08-30 VITALS
HEART RATE: 51 BPM | DIASTOLIC BLOOD PRESSURE: 66 MMHG | HEIGHT: 75 IN | OXYGEN SATURATION: 94 % | WEIGHT: 234 LBS | SYSTOLIC BLOOD PRESSURE: 114 MMHG | BODY MASS INDEX: 29.09 KG/M2

## 2022-08-30 DIAGNOSIS — I10 ESSENTIAL HYPERTENSION: Chronic | ICD-10-CM

## 2022-08-30 DIAGNOSIS — Z79.899 LONG TERM CURRENT USE OF ANTIARRHYTHMIC MEDICAL THERAPY: ICD-10-CM

## 2022-08-30 DIAGNOSIS — I48.0 PAROXYSMAL ATRIAL FIBRILLATION: Primary | Chronic | ICD-10-CM

## 2022-08-30 DIAGNOSIS — E78.5 HYPERLIPIDEMIA LDL GOAL <100: Chronic | ICD-10-CM

## 2022-08-30 PROCEDURE — 99214 OFFICE O/P EST MOD 30 MIN: CPT | Performed by: NURSE PRACTITIONER

## 2022-08-30 PROCEDURE — 93000 ELECTROCARDIOGRAM COMPLETE: CPT | Performed by: NURSE PRACTITIONER

## 2022-08-30 RX ORDER — VALSARTAN 80 MG/1
TABLET ORAL
COMMUNITY
End: 2022-08-30

## 2022-08-30 RX ORDER — AMIODARONE HYDROCHLORIDE 200 MG/1
200 TABLET ORAL DAILY
Qty: 90 TABLET | Refills: 1 | Status: ON HOLD | OUTPATIENT
Start: 2022-08-30 | End: 2022-09-29 | Stop reason: SDUPTHER

## 2022-08-30 NOTE — ASSESSMENT & PLAN NOTE
· Patient to remain off Eliquis due to hematuria and bleeding from recent nephrostomy tube placement until follow-up with urology/Dr. Rios which is scheduled for next week.  If urology okay with restarting Eliquis can restart at 5 mg twice daily  · Decrease amiodarone to 200 mg daily  · Continue metoprolol tartrate 100 mg twice daily

## 2022-08-30 NOTE — PROGRESS NOTES
[facility] Cardiology      Identification: Darien Camarena is a 86 y.o. male who resides in [place], [place]    Reason for visit:  PAF, Hypertension, and Hyperlipidemia    Assessment     Problem List Items Addressed This Visit        Cardiac and Vasculature    Paroxysmal atrial fibrillation (HCC) - Primary (Chronic)    Overview     · Diagnosed August 2012.   · FARHAD-guided ECV, 8/17/2012  · CHADS-VASc 5 (age > 75, CAD, HTN, DM)  · Successful external cardioversion for asymptomatic atrial fibrillation with RVR, 10/25/18  · Successful cardioversion for atrial fibrillation RVR, 3/6/19.  Sotalol increased  · Minimally symptomatic atrial fibrillation with cardioversion and the ER, 10/31/2019  · ED cardioversion for A. fib with RVR, 7/21/2020  · ED cardioversion for asymptomatic A. fib with RVR, 12/27/2020   · ED cardioversion for asymptomatic A. fib with RVR x 2  occasions, March 2021  · Transition from sotalol to amiodarone, 4/14/2021  · Successful FARHAD/ECV May 3, 2021  · Successful cardioversion, 8/5/2022         Current Assessment & Plan     · Patient to remain off Eliquis due to hematuria and bleeding from recent nephrostomy tube placement until follow-up with urology/Dr. Rios which is scheduled for next week.  If urology okay with restarting Eliquis can restart at 5 mg twice daily  · Decrease amiodarone to 200 mg daily  · Continue metoprolol tartrate 100 mg twice daily         Relevant Medications    amiodarone (PACERONE) 200 MG tablet    Other Relevant Orders    ECG 12 Lead    Hyperlipidemia LDL goal <100 (Chronic)    Overview     · Moderate intensity statin therapy reasonable due to diabetic status         Current Assessment & Plan     · Continue pravastatin 40 mg daily         Essential hypertension (Chronic)    Overview     • Target blood pressure <130/80 mmHg         Current Assessment & Plan     · Hypertension is controlled  · Continue metoprolol tartrate 100 mg twice daily  · Continue hydrochlorothiazide  12.5 mg daily         Long term current use of antiarrhythmic medical therapy      Because of the patient's confusion after the patient left I contacted his daughter Nikki and most of the information I dictated came from her.  Isidro was confused and could not give me the specifics of his hospital stay or emergency room visit other than he had been urinating blood and bleeding from his nephrostomy site.  He had no cardiac complaints and is having no current bleeding from the site.  I instructed him to continue to stay off his Eliquis.  I did decrease his amiodarone to 200 mg daily.  I told his daughter Nikki he should remain off the Eliquis until he follows up with Dr. Rios with urology at already scheduled appointment next week.  I also recommended to Nikki that her father follow-up with Dr. Way and they should express to him his confusion and memory issues.   Plan   • Patient to remain off Eliquis until follow-up appoint with Dr. Rios/urology already scheduled for next week  • Decrease amiodarone 200 mg daily  • Follow-up with Dr. Way regarding memory issues  • This plan of care was discussed with his daughter Nikki via telephone    Follow-up   Return in about 6 months (around 2/28/2023), or if symptoms worsen or fail to improve, for Follow-up with Dr. Oliveros next visit.        Subjective      Patient is an 86-year-old gentleman who returns today for follow-up of his paroxysmal atrial fibrillation and cardiac risk factors.  Since his last visit the patient underwent a cardioversion on 8/5/2022 after presenting to [facility] emergency room in A. fib with RVR.  He was started on amiodarone and sotalol was discontinued.  He has been taking amiodarone 200 mg twice daily.  He is maintaining normal sinus rhythm confirmed by EKG today.  He has no complaints cardiac wise but  the patient has continued to have recurrent UTIs.  He has been following Dr. Rios at [facility] and he  "recently underwent a nephrostomy tube with interventional radiology at [facility] last week.  Last night the patient had increased hematuria and bleeding from the tube site.  He went to [place] emergency room where they told him to stay off his Eliquis until seeing us today.  He says he has not had any more bleeding.  The patient is more confused than I am used to seeing him.  He cannot recall most of what I just dictated and I had to contact his daughter, Nikki, to get the details.  In speaking with his daughter on the phone she does say her father has had increased confusion.  She thought initially it was after losing his wife/her mother that it was grief but she is now getting concerned as he has difficulty remembering things.      Review of Systems   Constitutional: Negative for malaise/fatigue.   Eyes: Negative for vision loss in left eye and vision loss in right eye.   Cardiovascular: Negative for chest pain, dyspnea on exertion, near-syncope, orthopnea, palpitations, paroxysmal nocturnal dyspnea and syncope.   Musculoskeletal: Negative for myalgias.   Genitourinary: Positive for hematuria.   Neurological: Negative for brief paralysis, excessive daytime sleepiness, focal weakness, numbness, paresthesias and weakness.   All other systems reviewed and are negative.      Objective     /66 (BP Location: Left arm, Patient Position: Sitting)   Pulse 51   Ht 190.5 cm (75\")   Wt 106 kg (234 lb)   SpO2 94%   BMI 29.25 kg/m²       Constitutional:       Appearance: Healthy appearance. Well-developed.   Eyes:      General: Lids are normal. No scleral icterus.     Conjunctiva/sclera: Conjunctivae normal.   HENT:      Head: Normocephalic and atraumatic.   Neck:      Thyroid: No thyromegaly.      Vascular: No carotid bruit or JVD.   Pulmonary:      Effort: Pulmonary effort is normal.      Breath sounds: Normal breath sounds. No wheezing. No rhonchi. No rales.   Cardiovascular:      Normal rate. " Regular rhythm.      Murmurs: There is no murmur.      No gallop. No rub.   Pulses:     Intact distal pulses.   Edema:     Peripheral edema absent.   Abdominal:      General: There is no distension.      Palpations: Abdomen is soft. There is no abdominal mass.   Musculoskeletal:      Cervical back: Normal range of motion. Skin:     General: Skin is warm and dry.      Findings: No rash.   Neurological:      General: No focal deficit present.      Mental Status: Alert and oriented to person, place, and time.      Gait: Gait is intact.   Psychiatric:         Attention and Perception: Attention normal.         Mood and Affect: Mood normal.         Behavior: Behavior normal.         Result Review  (reviewed with patient):        ECG 12 Lead    Date/Time: 8/30/2022 4:48 PM  Performed by: Blanquita Ansari APRN  Authorized by: Blanquita Ansari APRN   Comparison: compared with previous ECG from 8/5/2022  Comparison to previous ECG: Previous ekg afib with RVR and current ekg sinus ekaterina  Rhythm: sinus bradycardia  Rate: bradycardic  BPM: 52    Clinical impression: abnormal EKG  Comments: Sinus Bradycardia  Qt/UBw408/455 ms             Lab Results   Component Value Date    GLUCOSE 98 08/20/2022    BUN 15 08/20/2022    CREATININE 1.08 08/20/2022    EGFRIFNONA 79 12/16/2021    BCR 13.9 08/20/2022    K 3.9 08/20/2022    CO2 34.0 (H) 08/20/2022    CALCIUM 8.6 08/20/2022    ALBUMIN 3.50 08/20/2022    AST 51 (H) 08/20/2022    ALT 38 08/20/2022     Lab Results   Component Value Date    WBC 5.89 08/20/2022    HGB 14.0 08/20/2022    HCT 44.0 08/20/2022    MCV 96.1 08/20/2022     08/20/2022     Lab Results   Component Value Date    CHOL 89 08/20/2022    TRIG 73 08/20/2022    HDL 36 (L) 08/20/2022    LDL 37 08/20/2022     Lab Results   Component Value Date    HGBA1C 5.60 07/27/2022       Blanquita CORDOBA  8/30/2022

## 2022-08-30 NOTE — ASSESSMENT & PLAN NOTE
· Hypertension is controlled  · Continue metoprolol tartrate 100 mg twice daily  · Continue hydrochlorothiazide 12.5 mg daily

## 2022-09-01 ENCOUNTER — OFFICE VISIT (OUTPATIENT)
Dept: INTERNAL MEDICINE | Facility: CLINIC | Age: 87
End: 2022-09-01

## 2022-09-01 VITALS
SYSTOLIC BLOOD PRESSURE: 124 MMHG | RESPIRATION RATE: 16 BRPM | DIASTOLIC BLOOD PRESSURE: 66 MMHG | BODY MASS INDEX: 29.27 KG/M2 | OXYGEN SATURATION: 93 % | HEART RATE: 58 BPM | WEIGHT: 234.2 LBS | TEMPERATURE: 96.8 F

## 2022-09-01 DIAGNOSIS — I10 ESSENTIAL HYPERTENSION: Primary | ICD-10-CM

## 2022-09-01 PROCEDURE — 99213 OFFICE O/P EST LOW 20 MIN: CPT | Performed by: INTERNAL MEDICINE

## 2022-09-01 NOTE — PROGRESS NOTES
Chief Complaint   Patient presents with   • Hypertension       History of Present Illness    The patient presents for a follow-up related to hypertension. The patient reports that he has had no headaches, chest pain, dyspnea, edema, syncope, blurred vision or palpitations. He states that he is taking his medication as prescribed. He is not having medication side effects.    Medications      Current Outpatient Medications:   •  allopurinol (ZYLOPRIM) 300 MG tablet, Take 1 tablet by mouth Daily., Disp: 90 tablet, Rfl: 1  •  amiodarone (PACERONE) 200 MG tablet, Take 1 tablet by mouth Daily., Disp: 90 tablet, Rfl: 1  •  Ascorbic Acid (VITAMIN C) 500 MG capsule, Take 1 tablet by mouth 4 (four) times a day., Disp: , Rfl:   •  Ferrous Gluconate (IRON) 240 (27 FE) MG tablet, Take 1 tablet by mouth daily., Disp: , Rfl:   •  metFORMIN (GLUCOPHAGE) 1000 MG tablet, Take 1 tablet by mouth 2 (Two) Times a Day., Disp: 180 tablet, Rfl: 1  •  metoprolol tartrate (LOPRESSOR) 100 MG tablet, Take 100 mg by mouth 2 (Two) Times a Day., Disp: , Rfl:   •  omeprazole (priLOSEC) 20 MG capsule, Take 1 capsule by mouth Daily., Disp: 90 capsule, Rfl: 1  •  pravastatin (PRAVACHOL) 40 MG tablet, Take 1 tablet by mouth Daily., Disp: 90 tablet, Rfl: 1  •  Probiotic Product (PROBIOTIC ADVANCED PO), Take 1 tablet by mouth 2 (Two) Times a Day., Disp: , Rfl:   •  tamsulosin (FLOMAX) 0.4 MG capsule 24 hr capsule, Take 1 capsule by mouth Daily., Disp: 90 capsule, Rfl: 1  •  apixaban (Eliquis) 5 MG tablet tablet, Take 1 tablet by mouth Every 12 (Twelve) Hours., Disp: 180 tablet, Rfl: 3  •  docusate sodium (COLACE) 100 MG capsule, Take 300 mg by mouth every night., Disp: , Rfl:   •  finasteride (PROSCAR) 5 MG tablet, Take 1 tablet by mouth every night at bedtime., Disp: 90 tablet, Rfl: 1  •  hydroCHLOROthiazide (HYDRODIURIL) 12.5 MG oral, Take 12.5 mg by mouth Daily., Disp: , Rfl:   •  HYDROcodone-acetaminophen (NORCO) 5-325 MG per tablet, Take 1 tablet by  mouth Every 6 (Six) Hours As Needed for Moderate Pain ., Disp: 10 tablet, Rfl: 0     Allergies    Allergies   Allergen Reactions   • Xarelto [Rivaroxaban] GI Bleeding     GI bleed   • Penicillins Hives     Has tolerated cefepime, ceftriaxone       Problem List    Patient Active Problem List   Diagnosis   • Atopic rhinitis   • Benign (familial) paraproteinemia   • Type 2 diabetes mellitus with stage 3 chronic kidney disease, without long-term current use of insulin (HCC)   • Enlarged prostate without lower urinary tract symptoms (luts)   • Gastroesophageal reflux disease with esophagitis   • Polyp of gallbladder   • Gout   • Liver cyst   • Hyperlipidemia LDL goal <100   • Essential hypertension   • Nephrolithiasis   • Obstructive sleep apnea syndrome   • Paroxysmal atrial fibrillation (HCC)   • Stage 3 chronic kidney disease (HCC)   • Long term current use of antiarrhythmic medical therapy   • Hydronephrosis   • Hyponatremia       Medications, Allergies, Problems List and Past History were reviewed and updated.    Physical Examination    /66 (BP Location: Left arm, Patient Position: Sitting, Cuff Size: Adult)   Pulse 58   Temp 96.8 °F (36 °C) (Infrared)   Resp 16   Wt 106 kg (234 lb 3.2 oz)   SpO2 93%   BMI 29.27 kg/m²     Neck: Thyroid- non enlarged, symmetric and has no nodules. No bruits are detected. ROM- Normal Range of Motion with no rigidity.    Lungs: Auscultation- Clear to auscultation bilaterally. There are no retractions, clubbing or cyanosis. The Expiratory to Inspiratory ratio is equal.    Cardiovascular: There are no carotid bruits. Heart- Normal Rate with Regular rhythm and no murmurs. There are no gallops. There are no rubs. In the lower extremities there is no edema. The upper extremities do not have edema.    Abdomen: Soft, benign, non-tender with no masses, hernias, organomegaly or scars.    Impression and Assessment    Essential Hypertension.    Plan    Essential Hypertension Plan: The  patient was instructed to continue the current medications.    Diagnoses and all orders for this visit:    1. Essential hypertension (Primary)    BMI is >= 25 and <30. (Overweight) The following options were offered after discussion;: exercise counseling/recommendations and nutrition counseling/recommendations    Return to Office    The patient was instructed to return for follow-up in 3 months. The patient was instructed to return sooner if the condition changes, worsens, or does not resolve.

## 2022-09-12 ENCOUNTER — TELEPHONE (OUTPATIENT)
Dept: INTERNAL MEDICINE | Facility: CLINIC | Age: 87
End: 2022-09-12

## 2022-09-12 ENCOUNTER — HOSPITAL ENCOUNTER (OUTPATIENT)
Dept: CARDIOLOGY | Facility: HOSPITAL | Age: 87
Discharge: HOME | End: 2022-09-12
Admitting: INTERNAL MEDICINE

## 2022-09-12 PROCEDURE — 0 TECHNETIUM SESTAMIBI: Performed by: INTERNAL MEDICINE

## 2022-09-12 PROCEDURE — 93018 CV STRESS TEST I&R ONLY: CPT | Performed by: INTERNAL MEDICINE

## 2022-09-12 PROCEDURE — 78452 HT MUSCLE IMAGE SPECT MULT: CPT

## 2022-09-12 PROCEDURE — 78452 HT MUSCLE IMAGE SPECT MULT: CPT | Performed by: INTERNAL MEDICINE

## 2022-09-12 PROCEDURE — 93017 CV STRESS TEST TRACING ONLY: CPT

## 2022-09-12 PROCEDURE — A9500 TC99M SESTAMIBI: HCPCS | Performed by: INTERNAL MEDICINE

## 2022-09-12 PROCEDURE — 25010000002 REGADENOSON 0.4 MG/5ML SOLUTION: Performed by: INTERNAL MEDICINE

## 2022-09-12 RX ADMIN — TECHNETIUM TC 99M SESTAMIBI 1 DOSE: 1 INJECTION INTRAVENOUS at 10:05

## 2022-09-12 RX ADMIN — TECHNETIUM TC 99M SESTAMIBI 1 DOSE: 1 INJECTION INTRAVENOUS at 08:15

## 2022-09-12 RX ADMIN — REGADENOSON 0.4 MG: 0.08 INJECTION, SOLUTION INTRAVENOUS at 10:04

## 2022-09-12 NOTE — TELEPHONE ENCOUNTER
Caller: SHAWN     Relationship to patient: Emergency Contact    Best call back number: [phone]    Patient is needing: SHAWN STATED THAT SHE WOULD LIKE TO TALK PROVIDER OR NURSE ABOUT SOME CONCERNS SHE IS HAVING WITH PATIENT     PLEASE ADVISE

## 2022-09-12 NOTE — TELEPHONE ENCOUNTER
Spoke with Nikki, patient's daughter and POA (on file in chart).  States she is very concerned about her dad and is seeing red flags and not sure what to do.  States patient did loose his wife in July and has had several sudden changes since and she doesn't know if it is just grieving or something more but very concerned. States his memory has really declined and he can't remember anything, even places that he has been to many many times. States he is losing weight rapidly.  States she know he has lost 15 lbs or more in the past month.  States he doesn't want to eat and if he does he has immediate sometimes uncontrolled diarrhea.  States he still works full time and has started making very costly mistakes at work.  States he sleeps a lot.  States patient also has a nephrostomy tube in to drain his urine due to constant UTI's that were making him septic.  States she and her sisters have been extremely unhappy with current urologist and would like to get changed to a Religious urologist if possible.  States patient does not have an appointment with Dr Way til 11/29 but she does not feel he can wait that long. Aware Dr Way gone for today but will get message tomorrow.

## 2022-09-13 NOTE — TELEPHONE ENCOUNTER
Please get him scheduled with me this week or next.     I will refer to urology at that time.    Pito Way MD  07:37 EDT  09/13/22

## 2022-09-13 NOTE — TELEPHONE ENCOUNTER
Spoke with patient daughter, Nikki, appointment scheduled for Thursday, 9/22 at 2:15pm with Dr Way.  Verbalized great appreciation.

## 2022-09-14 DIAGNOSIS — E78.5 HYPERLIPIDEMIA LDL GOAL <100: ICD-10-CM

## 2022-09-14 RX ORDER — PRAVASTATIN SODIUM 40 MG
TABLET ORAL
Qty: 90 TABLET | Refills: 1 | Status: SHIPPED | OUTPATIENT
Start: 2022-09-14 | End: 2023-01-09 | Stop reason: SDUPTHER

## 2022-09-15 LAB
BH CV REST NUCLEAR ISOTOPE DOSE: 9.9 MCI
BH CV STRESS BP STAGE 2: NORMAL
BH CV STRESS BP STAGE 4: NORMAL
BH CV STRESS COMMENTS STAGE 1: NORMAL
BH CV STRESS DOSE REGADENOSON STAGE 1: 0.4
BH CV STRESS DURATION MIN STAGE 1: 1
BH CV STRESS DURATION MIN STAGE 2: 1
BH CV STRESS DURATION MIN STAGE 3: 1
BH CV STRESS DURATION MIN STAGE 4: 1
BH CV STRESS DURATION SEC STAGE 1: 0
BH CV STRESS DURATION SEC STAGE 2: 0
BH CV STRESS DURATION SEC STAGE 3: 0
BH CV STRESS DURATION SEC STAGE 4: 0
BH CV STRESS GRADE STAGE 1: 10
BH CV STRESS HR STAGE 1: 97
BH CV STRESS HR STAGE 2: 110
BH CV STRESS HR STAGE 3: 106
BH CV STRESS HR STAGE 4: 104
BH CV STRESS METS STAGE 1: 5
BH CV STRESS NUCLEAR ISOTOPE DOSE: 32.1 MCI
BH CV STRESS O2 STAGE 1: 95
BH CV STRESS O2 STAGE 2: 95
BH CV STRESS O2 STAGE 3: 97
BH CV STRESS O2 STAGE 4: 98
BH CV STRESS PROTOCOL 1: NORMAL
BH CV STRESS RECOVERY BP: NORMAL MMHG
BH CV STRESS RECOVERY HR: 109 BPM
BH CV STRESS RECOVERY O2: 94 %
BH CV STRESS SPEED STAGE 1: 1.7
BH CV STRESS STAGE 1: 1
BH CV STRESS STAGE 2: 2
BH CV STRESS STAGE 3: 3
BH CV STRESS STAGE 4: 4
LV EF NUC BP: 56 %
MAXIMAL PREDICTED HEART RATE: 135 BPM
PERCENT MAX PREDICTED HR: 88.89 %
STRESS BASELINE BP: NORMAL MMHG
STRESS BASELINE HR: 113 BPM
STRESS O2 SAT REST: 93 %
STRESS PERCENT HR: 105 %
STRESS POST ESTIMATED WORKLOAD: 1.7 METS
STRESS POST EXERCISE DUR MIN: 4 MIN
STRESS POST EXERCISE DUR SEC: 0 SEC
STRESS POST O2 SAT PEAK: 98 %
STRESS POST PEAK BP: NORMAL MMHG
STRESS POST PEAK HR: 120 BPM
STRESS TARGET HR: 115 BPM

## 2022-09-16 ENCOUNTER — TELEPHONE (OUTPATIENT)
Dept: CARDIOLOGY | Facility: CLINIC | Age: 87
End: 2022-09-16

## 2022-09-16 DIAGNOSIS — I10 ESSENTIAL HYPERTENSION: Chronic | ICD-10-CM

## 2022-09-16 DIAGNOSIS — I25.10 CORONARY ARTERY DISEASE INVOLVING NATIVE CORONARY ARTERY OF NATIVE HEART WITHOUT ANGINA PECTORIS: Primary | ICD-10-CM

## 2022-09-16 DIAGNOSIS — R94.39 ABNORMAL NUCLEAR STRESS TEST: ICD-10-CM

## 2022-09-16 LAB
QT INTERVAL: 374 MS
QTC INTERVAL: 503 MS

## 2022-09-16 RX ORDER — TAMSULOSIN HYDROCHLORIDE 0.4 MG/1
CAPSULE ORAL
Qty: 90 CAPSULE | Refills: 1 | Status: SHIPPED | OUTPATIENT
Start: 2022-09-16 | End: 2023-01-09 | Stop reason: SDUPTHER

## 2022-09-16 RX ORDER — OMEPRAZOLE 20 MG/1
CAPSULE, DELAYED RELEASE ORAL
Qty: 90 CAPSULE | Refills: 1 | Status: SHIPPED | OUTPATIENT
Start: 2022-09-16 | End: 2023-01-09 | Stop reason: SDUPTHER

## 2022-09-16 NOTE — TELEPHONE ENCOUNTER
----- Message from BERYL Paiz sent at 9/16/2022  1:12 PM EDT -----  Regarding: Please call patient  Can you please call the patient and set heart cath up if he still feels poorly.  I am in clinic with Jayden today  ----- Message -----  From: Titus Oliveros IV, MD  Sent: 9/16/2022   8:57 AM EDT  To: BERYL Paiz    Yes if he is still not feeling well

## 2022-09-16 NOTE — TELEPHONE ENCOUNTER
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 With his numerous health issues and abnormal stress test, would remain off work until his LHC. Will put note in MyChart.

## 2022-09-16 NOTE — TELEPHONE ENCOUNTER
Spoke with daughter, Columba and reviewed results. She reports he is still not feeling well. Has had a significant weight loss, memory issues, urinary issues and fatigue. She would like to proceed with a [facility]. Orders placed.

## 2022-09-19 RX ORDER — LISINOPRIL 40 MG/1
TABLET ORAL
Qty: 90 TABLET | Refills: 1 | OUTPATIENT
Start: 2022-09-19

## 2022-09-20 ENCOUNTER — PREP FOR SURGERY (OUTPATIENT)
Dept: OTHER | Facility: HOSPITAL | Age: 87
End: 2022-09-20

## 2022-09-20 DIAGNOSIS — R94.39 ABNORMAL STRESS TEST: Primary | ICD-10-CM

## 2022-09-20 RX ORDER — ACETAMINOPHEN 325 MG/1
650 TABLET ORAL EVERY 4 HOURS PRN
Status: CANCELLED | OUTPATIENT
Start: 2022-09-20

## 2022-09-20 RX ORDER — SODIUM CHLORIDE 0.9 % (FLUSH) 0.9 %
10 SYRINGE (ML) INJECTION AS NEEDED
Status: CANCELLED | OUTPATIENT
Start: 2022-09-20

## 2022-09-20 RX ORDER — ASPIRIN 81 MG/1
81 TABLET ORAL DAILY
Status: CANCELLED | OUTPATIENT
Start: 2022-09-21

## 2022-09-20 RX ORDER — ASPIRIN 81 MG/1
324 TABLET, CHEWABLE ORAL ONCE
Status: CANCELLED | OUTPATIENT
Start: 2022-09-20 | End: 2022-09-20

## 2022-09-20 RX ORDER — SODIUM CHLORIDE 0.9 % (FLUSH) 0.9 %
10 SYRINGE (ML) INJECTION EVERY 12 HOURS SCHEDULED
Status: CANCELLED | OUTPATIENT
Start: 2022-09-20

## 2022-09-20 RX ORDER — NITROGLYCERIN 0.4 MG/1
0.4 TABLET SUBLINGUAL
Status: CANCELLED | OUTPATIENT
Start: 2022-09-20

## 2022-09-21 RX ORDER — METHENAMINE HIPPURATE 1000 MG/1
TABLET ORAL
Qty: 90 TABLET | Refills: 1 | OUTPATIENT
Start: 2022-09-21

## 2022-09-22 ENCOUNTER — LAB (OUTPATIENT)
Dept: LAB | Facility: HOSPITAL | Age: 87
End: 2022-09-22

## 2022-09-22 ENCOUNTER — OFFICE VISIT (OUTPATIENT)
Dept: INTERNAL MEDICINE | Facility: CLINIC | Age: 87
End: 2022-09-22

## 2022-09-22 VITALS
DIASTOLIC BLOOD PRESSURE: 68 MMHG | RESPIRATION RATE: 18 BRPM | TEMPERATURE: 97.8 F | HEART RATE: 52 BPM | BODY MASS INDEX: 28.12 KG/M2 | SYSTOLIC BLOOD PRESSURE: 146 MMHG | WEIGHT: 225 LBS

## 2022-09-22 DIAGNOSIS — H91.93 BILATERAL HEARING LOSS, UNSPECIFIED HEARING LOSS TYPE: ICD-10-CM

## 2022-09-22 DIAGNOSIS — R26.81 GAIT INSTABILITY: ICD-10-CM

## 2022-09-22 DIAGNOSIS — E78.5 HYPERLIPIDEMIA, UNSPECIFIED HYPERLIPIDEMIA TYPE: ICD-10-CM

## 2022-09-22 DIAGNOSIS — I10 ESSENTIAL HYPERTENSION: Primary | ICD-10-CM

## 2022-09-22 DIAGNOSIS — K21.00 GASTROESOPHAGEAL REFLUX DISEASE WITH ESOPHAGITIS WITHOUT HEMORRHAGE: ICD-10-CM

## 2022-09-22 DIAGNOSIS — F32.9 REACTIVE DEPRESSION: ICD-10-CM

## 2022-09-22 DIAGNOSIS — E11.9 TYPE 2 DIABETES MELLITUS WITHOUT COMPLICATION, WITHOUT LONG-TERM CURRENT USE OF INSULIN: ICD-10-CM

## 2022-09-22 LAB
ALBUMIN SERPL-MCNC: 3.5 G/DL (ref 3.5–5.2)
ALBUMIN/GLOB SERPL: 1 G/DL
ALP SERPL-CCNC: 131 U/L (ref 39–117)
ALT SERPL W P-5'-P-CCNC: 68 U/L (ref 1–41)
ANION GAP SERPL CALCULATED.3IONS-SCNC: 10 MMOL/L (ref 5–15)
AST SERPL-CCNC: 60 U/L (ref 1–40)
BASOPHILS # BLD AUTO: 0.07 10*3/MM3 (ref 0–0.2)
BASOPHILS NFR BLD AUTO: 0.7 % (ref 0–1.5)
BILIRUB SERPL-MCNC: 0.4 MG/DL (ref 0–1.2)
BUN SERPL-MCNC: 21 MG/DL (ref 8–23)
BUN/CREAT SERPL: 20.8 (ref 7–25)
CALCIUM SPEC-SCNC: 8.9 MG/DL (ref 8.6–10.5)
CHLORIDE SERPL-SCNC: 100 MMOL/L (ref 98–107)
CHOLEST SERPL-MCNC: 105 MG/DL (ref 0–200)
CO2 SERPL-SCNC: 31 MMOL/L (ref 22–29)
CREAT SERPL-MCNC: 1.01 MG/DL (ref 0.76–1.27)
DEPRECATED RDW RBC AUTO: 46.7 FL (ref 37–54)
EGFRCR SERPLBLD CKD-EPI 2021: 72.9 ML/MIN/1.73
EOSINOPHIL # BLD AUTO: 0.24 10*3/MM3 (ref 0–0.4)
EOSINOPHIL NFR BLD AUTO: 2.5 % (ref 0.3–6.2)
ERYTHROCYTE [DISTWIDTH] IN BLOOD BY AUTOMATED COUNT: 13.7 % (ref 12.3–15.4)
GLOBULIN UR ELPH-MCNC: 3.4 GM/DL
GLUCOSE SERPL-MCNC: 82 MG/DL (ref 65–99)
HBA1C MFR BLD: 6 % (ref 4.8–5.6)
HCT VFR BLD AUTO: 39.2 % (ref 37.5–51)
HDLC SERPL-MCNC: 34 MG/DL (ref 40–60)
HGB BLD-MCNC: 12.3 G/DL (ref 13–17.7)
IMM GRANULOCYTES # BLD AUTO: 0.08 10*3/MM3 (ref 0–0.05)
IMM GRANULOCYTES NFR BLD AUTO: 0.8 % (ref 0–0.5)
LDLC SERPL CALC-MCNC: 51 MG/DL (ref 0–100)
LDLC/HDLC SERPL: 1.44 {RATIO}
LYMPHOCYTES # BLD AUTO: 2.48 10*3/MM3 (ref 0.7–3.1)
LYMPHOCYTES NFR BLD AUTO: 26.3 % (ref 19.6–45.3)
MCH RBC QN AUTO: 29.6 PG (ref 26.6–33)
MCHC RBC AUTO-ENTMCNC: 31.4 G/DL (ref 31.5–35.7)
MCV RBC AUTO: 94.5 FL (ref 79–97)
MONOCYTES # BLD AUTO: 0.7 10*3/MM3 (ref 0.1–0.9)
MONOCYTES NFR BLD AUTO: 7.4 % (ref 5–12)
NEUTROPHILS NFR BLD AUTO: 5.87 10*3/MM3 (ref 1.7–7)
NEUTROPHILS NFR BLD AUTO: 62.3 % (ref 42.7–76)
NRBC BLD AUTO-RTO: 0 /100 WBC (ref 0–0.2)
PLATELET # BLD AUTO: 312 10*3/MM3 (ref 140–450)
PMV BLD AUTO: 10.3 FL (ref 6–12)
POTASSIUM SERPL-SCNC: 4.1 MMOL/L (ref 3.5–5.2)
PROT SERPL-MCNC: 6.9 G/DL (ref 6–8.5)
RBC # BLD AUTO: 4.15 10*6/MM3 (ref 4.14–5.8)
SODIUM SERPL-SCNC: 141 MMOL/L (ref 136–145)
TRIGL SERPL-MCNC: 110 MG/DL (ref 0–150)
VLDLC SERPL-MCNC: 20 MG/DL (ref 5–40)
WBC NRBC COR # BLD: 9.44 10*3/MM3 (ref 3.4–10.8)

## 2022-09-22 PROCEDURE — 85025 COMPLETE CBC W/AUTO DIFF WBC: CPT | Performed by: INTERNAL MEDICINE

## 2022-09-22 PROCEDURE — 99214 OFFICE O/P EST MOD 30 MIN: CPT | Performed by: INTERNAL MEDICINE

## 2022-09-22 PROCEDURE — 80061 LIPID PANEL: CPT | Performed by: INTERNAL MEDICINE

## 2022-09-22 PROCEDURE — 83036 HEMOGLOBIN GLYCOSYLATED A1C: CPT | Performed by: INTERNAL MEDICINE

## 2022-09-22 PROCEDURE — 80053 COMPREHEN METABOLIC PANEL: CPT | Performed by: INTERNAL MEDICINE

## 2022-09-22 RX ORDER — FUROSEMIDE 20 MG/1
20 TABLET ORAL 2 TIMES DAILY
COMMUNITY
Start: 2022-09-17 | End: 2022-10-30 | Stop reason: SDUPTHER

## 2022-09-28 ENCOUNTER — TELEPHONE (OUTPATIENT)
Dept: INTERNAL MEDICINE | Facility: CLINIC | Age: 87
End: 2022-09-28

## 2022-09-28 PROBLEM — I25.119 CORONARY ARTERY DISEASE INVOLVING NATIVE CORONARY ARTERY OF NATIVE HEART WITH ANGINA PECTORIS: Status: ACTIVE | Noted: 2022-09-16

## 2022-09-28 PROBLEM — E87.1 HYPONATREMIA: Status: RESOLVED | Noted: 2022-06-21 | Resolved: 2022-09-28

## 2022-09-28 NOTE — TELEPHONE ENCOUNTER
Caller: SHAWN KWON    Relationship: Emergency Contact    Best call back number: [phone]     Requested Prescriptions: LISINOPRIL 40 MG       Pharmacy where request should be sent: [facility] - [place] [id number] - [phone]  - [phone] FX     Additional details provided by patient: PATIENT'S DAUGHTER READ A LETTER THAT STATES THAT THE PHARMACY HAS TRIED TO CONTACT THE PROVIDER BUT THE REQUEST FOR MEDICATION WAS DENIED. THEY ARE NEEDING A PRESCRIPTION SENT TO THEM     Does the patient have less than a 3 day supply:  [x] Yes  [] No    Claudio Linton Rep   09/28/22 08:31 EDT

## 2022-09-28 NOTE — TELEPHONE ENCOUNTER
Lisinopril requested, but was discontinued by previous provider.  Additional information to be obtained.

## 2022-09-28 NOTE — TELEPHONE ENCOUNTER
It looks like another provider discontinued this medication.  Are they sure that the patient should be continued on this and if so we may need to do a telehealth visit to discuss hypertensive medications since I have not seen this patient in the past?  Keep me posted and happy to help!

## 2022-09-28 NOTE — TELEPHONE ENCOUNTER
Reached Nikki - daughter on [phone], advised of clinical message from Dr. Marquez. Nikki stated they will ride this out until Dr. Way is back.

## 2022-09-29 ENCOUNTER — HOSPITAL ENCOUNTER (OUTPATIENT)
Facility: HOSPITAL | Age: 87
Setting detail: HOSPITAL OUTPATIENT SURGERY
Discharge: HOME | End: 2022-09-29
Attending: INTERNAL MEDICINE | Admitting: INTERNAL MEDICINE

## 2022-09-29 ENCOUNTER — TELEPHONE (OUTPATIENT)
Dept: CARDIOLOGY | Facility: CLINIC | Age: 87
End: 2022-09-29

## 2022-09-29 VITALS
HEIGHT: 75 IN | RESPIRATION RATE: 18 BRPM | DIASTOLIC BLOOD PRESSURE: 72 MMHG | TEMPERATURE: 97.5 F | WEIGHT: 221.2 LBS | HEART RATE: 52 BPM | OXYGEN SATURATION: 93 % | SYSTOLIC BLOOD PRESSURE: 129 MMHG | BODY MASS INDEX: 27.5 KG/M2

## 2022-09-29 DIAGNOSIS — I48.0 PAROXYSMAL ATRIAL FIBRILLATION: Chronic | ICD-10-CM

## 2022-09-29 DIAGNOSIS — I25.10 CORONARY ARTERY DISEASE INVOLVING NATIVE CORONARY ARTERY OF NATIVE HEART WITHOUT ANGINA PECTORIS: ICD-10-CM

## 2022-09-29 DIAGNOSIS — R94.39 ABNORMAL NUCLEAR STRESS TEST: ICD-10-CM

## 2022-09-29 DIAGNOSIS — R94.39 ABNORMAL STRESS TEST: ICD-10-CM

## 2022-09-29 PROBLEM — I42.8 NON-ISCHEMIC CARDIOMYOPATHY: Status: ACTIVE | Noted: 2022-09-29

## 2022-09-29 PROBLEM — I42.8 NON-ISCHEMIC CARDIOMYOPATHY: Status: RESOLVED | Noted: 2022-09-29 | Resolved: 2022-09-29

## 2022-09-29 LAB
ALBUMIN SERPL-MCNC: 3.2 G/DL (ref 3.5–5.2)
ALBUMIN/GLOB SERPL: 0.9 G/DL
ALP SERPL-CCNC: 149 U/L (ref 39–117)
ALT SERPL W P-5'-P-CCNC: 30 U/L (ref 1–41)
ANION GAP SERPL CALCULATED.3IONS-SCNC: 11 MMOL/L (ref 5–15)
AST SERPL-CCNC: 29 U/L (ref 1–40)
BILIRUB SERPL-MCNC: 1.2 MG/DL (ref 0–1.2)
BUN SERPL-MCNC: 26 MG/DL (ref 8–23)
BUN/CREAT SERPL: 28.9 (ref 7–25)
CALCIUM SPEC-SCNC: 8.9 MG/DL (ref 8.6–10.5)
CHLORIDE SERPL-SCNC: 97 MMOL/L (ref 98–107)
CO2 SERPL-SCNC: 29 MMOL/L (ref 22–29)
CREAT SERPL-MCNC: 0.9 MG/DL (ref 0.76–1.27)
DEPRECATED RDW RBC AUTO: 49.1 FL (ref 37–54)
EGFRCR SERPLBLD CKD-EPI 2021: 83.7 ML/MIN/1.73
ERYTHROCYTE [DISTWIDTH] IN BLOOD BY AUTOMATED COUNT: 14.4 % (ref 12.3–15.4)
GLOBULIN UR ELPH-MCNC: 3.5 GM/DL
GLUCOSE SERPL-MCNC: 106 MG/DL (ref 65–99)
HCT VFR BLD AUTO: 37.9 % (ref 37.5–51)
HGB BLD-MCNC: 12.3 G/DL (ref 13–17.7)
MCH RBC QN AUTO: 30.4 PG (ref 26.6–33)
MCHC RBC AUTO-ENTMCNC: 32.5 G/DL (ref 31.5–35.7)
MCV RBC AUTO: 93.8 FL (ref 79–97)
PLATELET # BLD AUTO: 201 10*3/MM3 (ref 140–450)
PMV BLD AUTO: 10.1 FL (ref 6–12)
POTASSIUM SERPL-SCNC: 4.1 MMOL/L (ref 3.5–5.2)
PROT SERPL-MCNC: 6.7 G/DL (ref 6–8.5)
RBC # BLD AUTO: 4.04 10*6/MM3 (ref 4.14–5.8)
SODIUM SERPL-SCNC: 137 MMOL/L (ref 136–145)
WBC NRBC COR # BLD: 10.26 10*3/MM3 (ref 3.4–10.8)

## 2022-09-29 PROCEDURE — 85027 COMPLETE CBC AUTOMATED: CPT

## 2022-09-29 PROCEDURE — 80053 COMPREHEN METABOLIC PANEL: CPT | Performed by: NURSE PRACTITIONER

## 2022-09-29 PROCEDURE — 25010000002 MIDAZOLAM PER 1 MG: Performed by: INTERNAL MEDICINE

## 2022-09-29 PROCEDURE — 36415 COLL VENOUS BLD VENIPUNCTURE: CPT

## 2022-09-29 PROCEDURE — 93458 L HRT ARTERY/VENTRICLE ANGIO: CPT | Performed by: INTERNAL MEDICINE

## 2022-09-29 PROCEDURE — 0 IOPAMIDOL PER 1 ML: Performed by: INTERNAL MEDICINE

## 2022-09-29 PROCEDURE — C1769 GUIDE WIRE: HCPCS | Performed by: INTERNAL MEDICINE

## 2022-09-29 PROCEDURE — 25010000002 FENTANYL CITRATE (PF) 50 MCG/ML SOLUTION: Performed by: INTERNAL MEDICINE

## 2022-09-29 PROCEDURE — C1894 INTRO/SHEATH, NON-LASER: HCPCS | Performed by: INTERNAL MEDICINE

## 2022-09-29 PROCEDURE — 25010000002 PHENYLEPHRINE 10 MG/ML SOLUTION: Performed by: INTERNAL MEDICINE

## 2022-09-29 PROCEDURE — 25010000002 HEPARIN (PORCINE) PER 1000 UNITS: Performed by: INTERNAL MEDICINE

## 2022-09-29 RX ORDER — ACETAMINOPHEN 325 MG/1
650 TABLET ORAL EVERY 4 HOURS PRN
Status: DISCONTINUED | OUTPATIENT
Start: 2022-09-29 | End: 2022-09-29 | Stop reason: HOSPADM

## 2022-09-29 RX ORDER — ASPIRIN 81 MG/1
324 TABLET, CHEWABLE ORAL ONCE
Status: COMPLETED | OUTPATIENT
Start: 2022-09-29 | End: 2022-09-29

## 2022-09-29 RX ORDER — ASPIRIN 81 MG/1
81 TABLET ORAL DAILY
Status: DISCONTINUED | OUTPATIENT
Start: 2022-09-30 | End: 2022-09-29 | Stop reason: HOSPADM

## 2022-09-29 RX ORDER — VALSARTAN 80 MG/1
80 TABLET ORAL DAILY
COMMUNITY
End: 2023-01-09 | Stop reason: SDUPTHER

## 2022-09-29 RX ORDER — SODIUM CHLORIDE 0.9 % (FLUSH) 0.9 %
10 SYRINGE (ML) INJECTION AS NEEDED
Status: DISCONTINUED | OUTPATIENT
Start: 2022-09-29 | End: 2022-09-29 | Stop reason: HOSPADM

## 2022-09-29 RX ORDER — NITROGLYCERIN 0.4 MG/1
0.4 TABLET SUBLINGUAL
Status: DISCONTINUED | OUTPATIENT
Start: 2022-09-29 | End: 2022-09-29 | Stop reason: HOSPADM

## 2022-09-29 RX ORDER — PHENYLEPHRINE HYDROCHLORIDE 10 MG/ML
INJECTION INTRAVENOUS AS NEEDED
Status: DISCONTINUED | OUTPATIENT
Start: 2022-09-29 | End: 2022-09-29 | Stop reason: HOSPADM

## 2022-09-29 RX ORDER — AMIODARONE HYDROCHLORIDE 200 MG/1
100 TABLET ORAL DAILY
Qty: 90 TABLET | Refills: 1 | Status: SHIPPED | OUTPATIENT
Start: 2022-09-29 | End: 2023-01-09 | Stop reason: SDUPTHER

## 2022-09-29 RX ORDER — HEPARIN SODIUM 1000 [USP'U]/ML
INJECTION, SOLUTION INTRAVENOUS; SUBCUTANEOUS AS NEEDED
Status: DISCONTINUED | OUTPATIENT
Start: 2022-09-29 | End: 2022-09-29 | Stop reason: HOSPADM

## 2022-09-29 RX ORDER — NICARDIPINE HCL-0.9% SOD CHLOR 1 MG/10 ML
SYRINGE (ML) INTRAVENOUS AS NEEDED
Status: DISCONTINUED | OUTPATIENT
Start: 2022-09-29 | End: 2022-09-29 | Stop reason: HOSPADM

## 2022-09-29 RX ORDER — FENTANYL CITRATE 50 UG/ML
INJECTION, SOLUTION INTRAMUSCULAR; INTRAVENOUS AS NEEDED
Status: DISCONTINUED | OUTPATIENT
Start: 2022-09-29 | End: 2022-09-29 | Stop reason: HOSPADM

## 2022-09-29 RX ORDER — LIDOCAINE HYDROCHLORIDE 10 MG/ML
INJECTION, SOLUTION EPIDURAL; INFILTRATION; INTRACAUDAL; PERINEURAL AS NEEDED
Status: DISCONTINUED | OUTPATIENT
Start: 2022-09-29 | End: 2022-09-29 | Stop reason: HOSPADM

## 2022-09-29 RX ORDER — SODIUM CHLORIDE 9 MG/ML
3 INJECTION, SOLUTION INTRAVENOUS CONTINUOUS
Status: ACTIVE | OUTPATIENT
Start: 2022-09-29 | End: 2022-09-29

## 2022-09-29 RX ORDER — SODIUM CHLORIDE 0.9 % (FLUSH) 0.9 %
10 SYRINGE (ML) INJECTION EVERY 12 HOURS SCHEDULED
Status: DISCONTINUED | OUTPATIENT
Start: 2022-09-29 | End: 2022-09-29 | Stop reason: HOSPADM

## 2022-09-29 RX ORDER — MIDAZOLAM HYDROCHLORIDE 1 MG/ML
INJECTION INTRAMUSCULAR; INTRAVENOUS AS NEEDED
Status: DISCONTINUED | OUTPATIENT
Start: 2022-09-29 | End: 2022-09-29 | Stop reason: HOSPADM

## 2022-09-29 RX ADMIN — ASPIRIN 81 MG 324 MG: 81 TABLET ORAL at 07:33

## 2022-09-29 RX ADMIN — SODIUM CHLORIDE 3 ML/KG/HR: 9 INJECTION, SOLUTION INTRAVENOUS at 07:55

## 2022-09-29 NOTE — TELEPHONE ENCOUNTER
Oxana at [facility] pharmacy called to clarify amiodarone dose. Called back and was disconnected. Called back again and left a message.

## 2022-10-17 DIAGNOSIS — I10 ESSENTIAL HYPERTENSION: Chronic | ICD-10-CM

## 2022-10-17 RX ORDER — AMLODIPINE BESYLATE 10 MG/1
TABLET ORAL
Qty: 90 TABLET | Refills: 1 | Status: SHIPPED | OUTPATIENT
Start: 2022-10-17 | End: 2023-01-24

## 2022-10-21 DIAGNOSIS — I48.0 PAROXYSMAL ATRIAL FIBRILLATION: ICD-10-CM

## 2022-10-30 NOTE — PROGRESS NOTES
Chief Complaint   Patient presents with   • ER follow up - [facility] ER 8/4 Dx with UTI       History of Present Illness    The patient presents to the office for a follow-up visit from a recent emergency room visit. He was seen at the ER on 04-Aug-2022. He was diagnosed with a urinary tract infection. The records have been received and reviewed. The medication list has been reconciled. His treatment consisted of oral antibiotics. He was placed on Cefdinir.  He denies side effects and is tolerating the medication well.  He continues to follow with urology as well.      Since being seen in the emergency room he reports that he is improved. He denies abdominal pain, itchy watery eyes, bone pain, chills, congestion, a dry cough, a wet cough, decreased appetite, diarrhea, dysuria, ear drainage, ear pain, eye drainage, facial pain, fever, a headache, joint pain, myalgias, nasal discharge, nausea, neck stiffness, night sweats, a rash, sneezing, a sore throat, vomiting or wheezing. He also reports that he does not have chest pain, dyspnea, edema, syncope, blurred vision or palpitations.    The patient presents for follow-up of a urinary tract infection. The patient denies gross hematuria, frequency, urgency, hesitancy, fever, vaginal discharge, pelvic pain or flank pain. He completed his medication. He has not had a prior history of frequent, recurrent urinary tract infections.    Medications      Current Outpatient Medications:   •  allopurinol (ZYLOPRIM) 300 MG tablet, Take 1 tablet by mouth Daily., Disp: 90 tablet, Rfl: 1  •  Ascorbic Acid (VITAMIN C) 500 MG capsule, Take 1 tablet by mouth 4 (four) times a day., Disp: , Rfl:   •  docusate sodium (COLACE) 100 MG capsule, Take 300 mg by mouth every night., Disp: , Rfl:   •  Ferrous Gluconate (IRON) 240 (27 FE) MG tablet, Take 1 tablet by mouth daily., Disp: , Rfl:   •  finasteride (PROSCAR) 5 MG tablet, Take 1 tablet by mouth every night at bedtime., Disp: 90 tablet, Rfl: 1  •   metoprolol tartrate (LOPRESSOR) 100 MG tablet, Take 100 mg by mouth 2 (Two) Times a Day., Disp: , Rfl:   •  Probiotic Product (PROBIOTIC ADVANCED PO), Take 1 tablet by mouth 2 (Two) Times a Day., Disp: , Rfl:   •  amiodarone (PACERONE) 200 MG tablet, Take 0.5 tablets by mouth Daily., Disp: 90 tablet, Rfl: 1  •  amLODIPine (NORVASC) 10 MG tablet, TAKE 1 TABLET EVERY DAY, Disp: 90 tablet, Rfl: 1  •  apixaban (Eliquis) 5 MG tablet tablet, Take 1 tablet by mouth Every 12 (Twelve) Hours., Disp: 180 tablet, Rfl: 1  •  furosemide (LASIX) 20 MG tablet, Take 20 mg by mouth 2 (Two) Times a Day., Disp: , Rfl:   •  metFORMIN (GLUCOPHAGE) 1000 MG tablet, TAKE 1 TABLET TWICE DAILY, Disp: 180 tablet, Rfl: 1  •  omeprazole (priLOSEC) 20 MG capsule, TAKE 1 CAPSULE EVERY DAY, Disp: 90 capsule, Rfl: 1  •  pravastatin (PRAVACHOL) 40 MG tablet, TAKE 1 TABLET EVERY DAY, Disp: 90 tablet, Rfl: 1  •  sertraline (Zoloft) 50 MG tablet, Take 1 tablet by mouth Daily., Disp: 30 tablet, Rfl: 2  •  tamsulosin (FLOMAX) 0.4 MG capsule 24 hr capsule, TAKE 1 CAPSULE EVERY DAY, Disp: 90 capsule, Rfl: 1  •  valsartan (DIOVAN) 80 MG tablet, Take 80 mg by mouth Daily., Disp: , Rfl:      Allergies    Allergies   Allergen Reactions   • Xarelto [Rivaroxaban] GI Bleeding     GI bleed   • Penicillins Hives     Has tolerated cefepime, ceftriaxone       Problem List    Patient Active Problem List   Diagnosis   • Atopic rhinitis   • Benign (familial) paraproteinemia   • Type 2 diabetes mellitus with stage 3 chronic kidney disease, without long-term current use of insulin (HCC)   • Enlarged prostate without lower urinary tract symptoms (luts)   • Gastroesophageal reflux disease with esophagitis   • Polyp of gallbladder   • Gout   • Liver cyst   • Hyperlipidemia LDL goal <100   • Essential hypertension   • Nephrolithiasis   • Obstructive sleep apnea syndrome   • Paroxysmal atrial fibrillation (HCC)   • Stage 3 chronic kidney disease (HCC)   • Long term current use  of antiarrhythmic medical therapy   • Hydronephrosis   • Coronary artery disease involving native coronary artery of native heart with angina pectoris (HCC)       Medications, Allergies, Problems List and Past History were reviewed and updated.    Physical Examination    /64 (BP Location: Left arm, Patient Position: Sitting, Cuff Size: Adult)   Pulse 68   Temp 97.7 °F (36.5 °C) (Infrared)   Resp 18   Wt 109 kg (240 lb)   BMI 30.00 kg/m²     HEENT: Facies- Within normal limits. Lids- Normal bilaterally. Conjunctiva- Clear bilaterally. Sclera- Anicteric bilaterally.    Lungs: Auscultation- Clear to auscultation bilaterally. There are no retractions, clubbing or cyanosis. The Expiratory to Inspiratory ratio is equal.    Cardiovascular: There are no carotid bruits. Heart- Normal Rate with Regular rhythm and no murmurs. There are no gallops. There are no rubs. In the lower extremities there is no edema. The upper extremities do not have edema.    Abdomen: Soft, benign, non-tender with no masses, hernias, organomegaly or scars.    Impression and Assessment    Urinary Tract Infection.    Plan    Urinary Tract Infection Plan: The patient was instructed to continue the current medications.    Diagnoses and all orders for this visit:    1. Acute UTI (Primary)        Return to Office    The patient was instructed to return for follow-up at the next scheduled visit. The patient was instructed to return sooner if the condition changes, worsens, or does not resolve.

## 2022-10-31 ENCOUNTER — OFFICE VISIT (OUTPATIENT)
Dept: INTERNAL MEDICINE | Facility: CLINIC | Age: 87
End: 2022-10-31

## 2022-10-31 VITALS
HEART RATE: 68 BPM | BODY MASS INDEX: 29.87 KG/M2 | DIASTOLIC BLOOD PRESSURE: 72 MMHG | WEIGHT: 239 LBS | RESPIRATION RATE: 20 BRPM | SYSTOLIC BLOOD PRESSURE: 148 MMHG | TEMPERATURE: 98.2 F

## 2022-10-31 DIAGNOSIS — F32.9 REACTIVE DEPRESSION: ICD-10-CM

## 2022-10-31 DIAGNOSIS — I10 ESSENTIAL HYPERTENSION: Primary | ICD-10-CM

## 2022-10-31 DIAGNOSIS — R26.81 GAIT INSTABILITY: ICD-10-CM

## 2022-10-31 PROCEDURE — 99214 OFFICE O/P EST MOD 30 MIN: CPT | Performed by: INTERNAL MEDICINE

## 2022-10-31 NOTE — PROGRESS NOTES
Chief Complaint   Patient presents with   • Follow-up     Follow up chronic medical problem       • Weight Loss       History of Present Illness    The patient presents for a follow-up related to Type 2 Diabetes Mellitus. He does not check his blood sugars at home. He denies hypoglycemic symptoms. The patient denies polyuria, polydypsia or polyphagia. He reports no symptoms of neuropathy. The patient takes his medication as prescribed. He is not taking insulin. The patient does check his feet daily at home. He denies chest pain, shortness of breath, orthopnea, paroxysmal nocturnal dyspnea, dyspnea on exertion, edema, palpitations or syncope.    The patient presents for a follow-up related to hyperlipidemia. He is following a low fat diet. He reports that he is exercising. He is taking pravastatin. The patient is taking his medication as prescribed. He reports no medication side effects, including muscle cramps, abdominal pain, headaches or weakness.    The patient presents for a follow-up related to hypertension. The patient reports that he has had no headaches or blurred vision. He states that he is taking his medication as prescribed. He is not having medication side effects.    The patient presents for a follow-up of gait instability which has been present for longer than one year. The symptoms are worsened. There are no complaints of headaches. He denies urinary incontinence. There are no complaints of paresthesias in the upper or lower extremities. He denies neck pain. There are no complaints of back pain. He reports that he does not have joint stiffness. There are no complaints of visual changes. He denies excessive alcohol abuse. The patient denies heat intolerance, cold intolerance, cough, fever, chills, night sweats, dysuria, hematuria or diarrhea.    The patient presents for a follow-up related to GERD. The patient is on omeprazole for his gastroesophageal reflux. The medication is taken on a regular basis  and gives complete relief of the symptoms. He reports no abdominal pain, belching, dysphagia, early satiety, heartburn, hoarseness, nausea, odynophagia, rectal bleeding, vomiting or weight loss. The GERD has no known aggravating factors.    The patient presents for a follow-up related to depression. He denies currently having depression symptoms. He denies suicidal ideation. His energy level is good. He denies agorophobia. He sleeps well. He is not tearful. He states that his current depression symptoms are stable. He is currently taking a medication. The current medication regimen consists of sertraline. The patient denies having medication side effects including nausea, headaches, anxiety, increased depression, anorgasmia or fatigue.    The patient presents for an acute visit for hearing loss. He reports symptoms in both ears. The symptoms have been present for a chronic duration. There is no ear pain. He denies tinnitis. He has not had exposure to loud noises throughout his life. There is no family history of hearing loss reported. There are no other associated symptoms of a dry cough, a wet cough, wheezing, facial pain, eye drainage, ear drainage, nasal congestion, a rash, decreased appetite, sore throat or myalgias.    Medications      Current Outpatient Medications:   •  allopurinol (ZYLOPRIM) 300 MG tablet, Take 1 tablet by mouth Daily., Disp: 90 tablet, Rfl: 1  •  Ascorbic Acid (VITAMIN C) 500 MG capsule, Take 1 tablet by mouth 4 (four) times a day., Disp: , Rfl:   •  docusate sodium (COLACE) 100 MG capsule, Take 300 mg by mouth every night., Disp: , Rfl:   •  Ferrous Gluconate (IRON) 240 (27 FE) MG tablet, Take 1 tablet by mouth daily., Disp: , Rfl:   •  finasteride (PROSCAR) 5 MG tablet, Take 1 tablet by mouth every night at bedtime., Disp: 90 tablet, Rfl: 1  •  furosemide (LASIX) 20 MG tablet, Take 20 mg by mouth 2 (Two) Times a Day., Disp: , Rfl:   •  metFORMIN (GLUCOPHAGE) 1000 MG tablet, TAKE 1 TABLET  TWICE DAILY, Disp: 180 tablet, Rfl: 1  •  metoprolol tartrate (LOPRESSOR) 100 MG tablet, Take 100 mg by mouth 2 (Two) Times a Day., Disp: , Rfl:   •  omeprazole (priLOSEC) 20 MG capsule, TAKE 1 CAPSULE EVERY DAY, Disp: 90 capsule, Rfl: 1  •  pravastatin (PRAVACHOL) 40 MG tablet, TAKE 1 TABLET EVERY DAY, Disp: 90 tablet, Rfl: 1  •  Probiotic Product (PROBIOTIC ADVANCED PO), Take 1 tablet by mouth 2 (Two) Times a Day., Disp: , Rfl:   •  tamsulosin (FLOMAX) 0.4 MG capsule 24 hr capsule, TAKE 1 CAPSULE EVERY DAY, Disp: 90 capsule, Rfl: 1  •  amiodarone (PACERONE) 200 MG tablet, Take 0.5 tablets by mouth Daily., Disp: 90 tablet, Rfl: 1  •  amLODIPine (NORVASC) 10 MG tablet, TAKE 1 TABLET EVERY DAY, Disp: 90 tablet, Rfl: 1  •  apixaban (Eliquis) 5 MG tablet tablet, Take 1 tablet by mouth Every 12 (Twelve) Hours., Disp: 180 tablet, Rfl: 1  •  sertraline (Zoloft) 50 MG tablet, Take 1 tablet by mouth Daily., Disp: 30 tablet, Rfl: 2  •  valsartan (DIOVAN) 80 MG tablet, Take 80 mg by mouth Daily., Disp: , Rfl:      Allergies    Allergies   Allergen Reactions   • Xarelto [Rivaroxaban] GI Bleeding     GI bleed   • Penicillins Hives     Has tolerated cefepime, ceftriaxone       Problem List    Patient Active Problem List   Diagnosis   • Atopic rhinitis   • Benign (familial) paraproteinemia   • Type 2 diabetes mellitus with stage 3 chronic kidney disease, without long-term current use of insulin (HCC)   • Enlarged prostate without lower urinary tract symptoms (luts)   • Gastroesophageal reflux disease with esophagitis   • Polyp of gallbladder   • Gout   • Liver cyst   • Hyperlipidemia LDL goal <100   • Essential hypertension   • Nephrolithiasis   • Obstructive sleep apnea syndrome   • Paroxysmal atrial fibrillation (HCC)   • Stage 3 chronic kidney disease (HCC)   • Long term current use of antiarrhythmic medical therapy   • Hydronephrosis   • Coronary artery disease involving native coronary artery of native heart with angina  pectoris ([facility])       Medications, Allergies, Problems List and Past History were reviewed and updated.    Physical Examination    /68 (BP Location: Right arm, Patient Position: Sitting, Cuff Size: Adult)   Pulse 52   Temp 97.8 °F (36.6 °C) (Infrared)   Resp 18   Wt 102 kg (225 lb)   BMI 28.12 kg/m²       HEENT: Head- Normocephalic Atraumatic. Facies- Within normal limits. Pinnas- Normal texture and shape bilaterally. Canals- Normal bilaterally. TMs- Normal bilaterally. Nares- Patent bilaterally. Nasal Septum- is normal. There is no tenderness to palpation over the frontal or maxillary sinuses. Lids- Normal bilaterally. Conjunctiva- Clear bilaterally. Sclera- Anicteric bilaterally. Oropharynx- Moist with no lesions. Tonsils- No enlargement, erythema or exudate.    Neck: Thyroid- non enlarged, symmetric and has no nodules. No bruits are detected. ROM- Normal Range of Motion with no rigidity.    Lungs: Auscultation- Clear to auscultation bilaterally. There are no retractions, clubbing or cyanosis. The Expiratory to Inspiratory ratio is equal.    Cardiovascular: There are no carotid bruits. Heart- Normal Rate with Regular rhythm and no murmurs. There are no gallops. There are no rubs. In the lower extremities there is no edema. The upper extremities do not have edema.    Abdomen: Soft, benign, non-tender with no masses, hernias, organomegaly or scars.    Gait: Unsteady.    Impression and Assessment    Type 2 Diabetes Mellitus without complication without insulin usage.    Hyperlipidemia.    Essential Hypertension.    Gait Instability.    Gastroesophageal Reflux Disease.    Reactive Depression.    Hearing Loss.    Plan    Gastroesophageal Reflux Disease Plan: The patient was instructed to continue the current medications.    Hyperlipidemia Plan: The patient was instructed to exercise daily, eat a low fat diet and continue his medications.    Essential Hypertension Plan: The patient was instructed to continue  the current medications.    Type 2 Diabetes Mellitus without complication without insulin usage Plan: The patient was instructed to continue the current medications. Further plans will be made following results of testing.    Hearing Loss Plan: He was referred to audiology.    Gait Instability Plan: He was referred to physical therapy.    Reactive Depression Plan: The patient was instructed to continue the current medications.    Diagnoses and all orders for this visit:    1. Essential hypertension (Primary)  -     CBC & Differential; Future  -     Comprehensive Metabolic Panel; Future  -     CBC & Differential  -     Comprehensive Metabolic Panel    2. Type 2 diabetes mellitus without complication, without long-term current use of insulin (HCC)  -     Comprehensive Metabolic Panel; Future  -     Hemoglobin A1c; Future  -     Comprehensive Metabolic Panel  -     Hemoglobin A1c    3. Hyperlipidemia, unspecified hyperlipidemia type  -     Comprehensive Metabolic Panel; Future  -     Lipid Panel; Future  -     Comprehensive Metabolic Panel  -     Lipid Panel    4. Gastroesophageal reflux disease with esophagitis without hemorrhage    5. Gait instability  -     Ambulatory Referral to Physical Therapy Evaluate and treat    6. Reactive depression  -     sertraline (Zoloft) 50 MG tablet; Take 1 tablet by mouth Daily.  Dispense: 30 tablet; Refill: 2    7. Bilateral hearing loss, unspecified hearing loss type  -     Ambulatory Referral to Audiology      Return to Office    The patient was instructed to return for follow-up in 1 month. The patient was instructed to return sooner if the condition changes, worsens, or does not resolve.

## 2022-10-31 NOTE — PROGRESS NOTES
Chief Complaint   Patient presents with   • Follow-up     1 month follow up chronic medical problems       History of Present Illness    The patient presents for a follow-up related to hypertension. The patient reports that he has had no headaches, chest pain, dyspnea, edema, syncope, blurred vision or palpitations. He states that he is taking his medication as prescribed. He is not having medication side effects.    The patient presents for a follow-up related to depression. He denies currently having depression symptoms. He denies suicidal ideation. His energy level is good. He denies agorophobia. He sleeps well. He is not tearful. He states that his current depression symptoms are improved. He is currently taking a medication. The current medication regimen consists of sertraline. The patient denies having medication side effects including nausea, headaches, anxiety, increased depression or fatigue.    The patient presents for a follow-up of gait instability which has been present for longer than one year. The symptoms are stable. There are no complaints of headaches. He denies urinary incontinence. There are no complaints of paresthesias in the upper or lower extremities. He denies neck pain. There are no complaints of back pain. He reports that he does not have joint stiffness. There are no complaints of visual changes. He denies excessive alcohol abuse. The patient denies heat intolerance, cold intolerance, cough, fever, chills, night sweats, dysuria, hematuria or diarrhea. He did not keep his PT appointment.    Medications      Current Outpatient Medications:   •  allopurinol (ZYLOPRIM) 300 MG tablet, Take 1 tablet by mouth Daily., Disp: 90 tablet, Rfl: 1  •  amiodarone (PACERONE) 200 MG tablet, Take 0.5 tablets by mouth Daily., Disp: 90 tablet, Rfl: 1  •  amLODIPine (NORVASC) 10 MG tablet, TAKE 1 TABLET EVERY DAY, Disp: 90 tablet, Rfl: 1  •  apixaban (Eliquis) 5 MG tablet tablet, Take 1 tablet by mouth Every  12 (Twelve) Hours., Disp: 180 tablet, Rfl: 1  •  Ascorbic Acid (VITAMIN C) 500 MG capsule, Take 1 tablet by mouth 4 (four) times a day., Disp: , Rfl:   •  docusate sodium (COLACE) 100 MG capsule, Take 300 mg by mouth every night., Disp: , Rfl:   •  Ferrous Gluconate (IRON) 240 (27 FE) MG tablet, Take 1 tablet by mouth daily., Disp: , Rfl:   •  finasteride (PROSCAR) 5 MG tablet, Take 1 tablet by mouth every night at bedtime., Disp: 90 tablet, Rfl: 1  •  furosemide (LASIX) 20 MG tablet, Take 20 mg by mouth 2 (Two) Times a Day., Disp: , Rfl:   •  metFORMIN (GLUCOPHAGE) 1000 MG tablet, TAKE 1 TABLET TWICE DAILY, Disp: 180 tablet, Rfl: 1  •  metoprolol tartrate (LOPRESSOR) 100 MG tablet, Take 100 mg by mouth 2 (Two) Times a Day., Disp: , Rfl:   •  omeprazole (priLOSEC) 20 MG capsule, TAKE 1 CAPSULE EVERY DAY, Disp: 90 capsule, Rfl: 1  •  pravastatin (PRAVACHOL) 40 MG tablet, TAKE 1 TABLET EVERY DAY, Disp: 90 tablet, Rfl: 1  •  Probiotic Product (PROBIOTIC ADVANCED PO), Take 1 tablet by mouth 2 (Two) Times a Day., Disp: , Rfl:   •  sertraline (Zoloft) 50 MG tablet, Take 1 tablet by mouth Daily., Disp: 30 tablet, Rfl: 2  •  tamsulosin (FLOMAX) 0.4 MG capsule 24 hr capsule, TAKE 1 CAPSULE EVERY DAY, Disp: 90 capsule, Rfl: 1  •  valsartan (DIOVAN) 80 MG tablet, Take 80 mg by mouth Daily., Disp: , Rfl:      Allergies    Allergies   Allergen Reactions   • Xarelto [Rivaroxaban] GI Bleeding     GI bleed   • Penicillins Hives     Has tolerated cefepime, ceftriaxone       Problem List    Patient Active Problem List   Diagnosis   • Atopic rhinitis   • Benign (familial) paraproteinemia   • Type 2 diabetes mellitus with stage 3 chronic kidney disease, without long-term current use of insulin (HCC)   • Enlarged prostate without lower urinary tract symptoms (luts)   • Gastroesophageal reflux disease with esophagitis   • Polyp of gallbladder   • Gout   • Liver cyst   • Hyperlipidemia LDL goal <100   • Essential hypertension   •  Nephrolithiasis   • Obstructive sleep apnea syndrome   • Paroxysmal atrial fibrillation (HCC)   • Stage 3 chronic kidney disease (HCC)   • Long term current use of antiarrhythmic medical therapy   • Hydronephrosis   • Coronary artery disease involving native coronary artery of native heart with angina pectoris (HCC)       Medications, Allergies, Problems List and Past History were reviewed and updated.    Physical Examination    /72 (BP Location: Left arm, Patient Position: Sitting, Cuff Size: Adult)   Pulse 68   Temp 98.2 °F (36.8 °C) (Infrared)   Resp 20   Wt 108 kg (239 lb)   BMI 29.87 kg/m²     HEENT: Head- Normocephalic Atraumatic. Facies- Within normal limits. Pinnas- Normal texture and shape bilaterally. Canals- Normal bilaterally. TMs- Normal bilaterally. Nares- Patent bilaterally. Nasal Septum- is normal. There is no tenderness to palpation over the frontal or maxillary sinuses. Lids- Normal bilaterally. Conjunctiva- Clear bilaterally. Sclera- Anicteric bilaterally. Oropharynx- Moist with no lesions. Tonsils- No enlargement, erythema or exudate.    Neck: Thyroid- non enlarged, symmetric and has no nodules. No bruits are detected. ROM- Normal Range of Motion with no rigidity.    Lungs: Auscultation- Clear to auscultation bilaterally. There are no retractions, clubbing or cyanosis. The Expiratory to Inspiratory ratio is equal.    Cardiovascular: There are no carotid bruits. Heart- Normal Rate with Regular rhythm and no murmurs. There are no gallops. There are no rubs. In the lower extremities there is no edema. The upper extremities do not have edema.    Abdomen: Soft, benign, non-tender with no masses, hernias, organomegaly or scars.    Gait: Normal.    Impression and Assessment    Essential Hypertension.    Reactive Depression.    Gait Instability.    Plan    Essential Hypertension Plan: The patient was instructed to continue the current medications.    Reactive Depression Plan: The patient was  instructed to continue the current medications.    Gait Instability Plan: He was referred to physical therapy.    Diagnoses and all orders for this visit:    1. Essential hypertension (Primary)    2. Reactive depression    3. Gait instability  -     Ambulatory Referral to Physical Therapy Evaluate and treat        Return to Office    The patient was instructed to return for follow-up in 2 months. The patient was instructed to return sooner if the condition changes, worsens, or does not resolve.

## 2022-11-01 RX ORDER — FUROSEMIDE 20 MG/1
20 TABLET ORAL 2 TIMES DAILY
Qty: 180 TABLET | Refills: 1 | Status: SHIPPED | OUTPATIENT
Start: 2022-11-01 | End: 2022-12-16 | Stop reason: HOSPADM

## 2022-11-23 RX ORDER — METOPROLOL TARTRATE 100 MG/1
TABLET ORAL
Qty: 180 TABLET | Refills: 0 | Status: SHIPPED | OUTPATIENT
Start: 2022-11-23 | End: 2023-01-09 | Stop reason: SDUPTHER

## 2022-12-10 DIAGNOSIS — F32.9 REACTIVE DEPRESSION: ICD-10-CM

## 2022-12-12 ENCOUNTER — APPOINTMENT (OUTPATIENT)
Dept: CT IMAGING | Facility: HOSPITAL | Age: 87
End: 2022-12-12

## 2022-12-12 ENCOUNTER — HOSPITAL ENCOUNTER (INPATIENT)
Facility: HOSPITAL | Age: 87
LOS: 4 days | Discharge: HOME | End: 2022-12-16
Attending: EMERGENCY MEDICINE | Admitting: INTERNAL MEDICINE

## 2022-12-12 ENCOUNTER — APPOINTMENT (OUTPATIENT)
Dept: GENERAL RADIOLOGY | Facility: HOSPITAL | Age: 87
End: 2022-12-12

## 2022-12-12 DIAGNOSIS — J96.01 ACUTE RESPIRATORY FAILURE WITH HYPOXIA: Primary | ICD-10-CM

## 2022-12-12 DIAGNOSIS — Z86.79 HISTORY OF ATRIAL FIBRILLATION: ICD-10-CM

## 2022-12-12 DIAGNOSIS — Z86.39 HISTORY OF DIABETES MELLITUS: ICD-10-CM

## 2022-12-12 DIAGNOSIS — J10.1 INFLUENZA A: ICD-10-CM

## 2022-12-12 PROBLEM — E11.9 TYPE 2 DIABETES MELLITUS: Status: ACTIVE | Noted: 2022-12-12

## 2022-12-12 PROBLEM — I16.0 HYPERTENSIVE URGENCY: Status: ACTIVE | Noted: 2022-12-12

## 2022-12-12 LAB
ALBUMIN SERPL-MCNC: 4 G/DL (ref 3.5–5.2)
ALBUMIN/GLOB SERPL: 1 G/DL
ALP SERPL-CCNC: 118 U/L (ref 39–117)
ALT SERPL W P-5'-P-CCNC: 20 U/L (ref 1–41)
ANION GAP SERPL CALCULATED.3IONS-SCNC: 11 MMOL/L (ref 5–15)
ARTERIAL PATENCY WRIST A: ABNORMAL
AST SERPL-CCNC: 33 U/L (ref 1–40)
ATMOSPHERIC PRESS: ABNORMAL MM[HG]
BASE EXCESS BLDA CALC-SCNC: 3.2 MMOL/L (ref 0–2)
BASOPHILS # BLD AUTO: 0.01 10*3/MM3 (ref 0–0.2)
BASOPHILS NFR BLD AUTO: 0.2 % (ref 0–1.5)
BDY SITE: ABNORMAL
BILIRUB SERPL-MCNC: 0.9 MG/DL (ref 0–1.2)
BODY TEMPERATURE: 37 C
BUN SERPL-MCNC: 18 MG/DL (ref 8–23)
BUN/CREAT SERPL: 22.8 (ref 7–25)
CALCIUM SPEC-SCNC: 8.7 MG/DL (ref 8.6–10.5)
CHLORIDE SERPL-SCNC: 98 MMOL/L (ref 98–107)
CO2 BLDA-SCNC: 31.1 MMOL/L (ref 22–33)
CO2 SERPL-SCNC: 29 MMOL/L (ref 22–29)
COHGB MFR BLD: 0.9 % (ref 0–2)
CREAT SERPL-MCNC: 0.79 MG/DL (ref 0.76–1.27)
D-LACTATE SERPL-SCNC: 1.7 MMOL/L (ref 0.5–2)
DEPRECATED RDW RBC AUTO: 49.5 FL (ref 37–54)
EGFRCR SERPLBLD CKD-EPI 2021: 86.5 ML/MIN/1.73
EOSINOPHIL # BLD AUTO: 0.01 10*3/MM3 (ref 0–0.4)
EOSINOPHIL NFR BLD AUTO: 0.2 % (ref 0.3–6.2)
EPAP: 0
ERYTHROCYTE [DISTWIDTH] IN BLOOD BY AUTOMATED COUNT: 13.7 % (ref 12.3–15.4)
FLUAV RNA RESP QL NAA+PROBE: DETECTED
FLUBV RNA RESP QL NAA+PROBE: NOT DETECTED
GLOBULIN UR ELPH-MCNC: 4.1 GM/DL
GLUCOSE SERPL-MCNC: 167 MG/DL (ref 65–99)
HCO3 BLDA-SCNC: 29.5 MMOL/L (ref 20–26)
HCT VFR BLD AUTO: 40.5 % (ref 37.5–51)
HCT VFR BLD CALC: 39.2 % (ref 38–51)
HGB BLD-MCNC: 12.9 G/DL (ref 13–17.7)
HGB BLDA-MCNC: 12.8 G/DL (ref 13.5–17.5)
HOLD SPECIMEN: NORMAL
IMM GRANULOCYTES # BLD AUTO: 0.01 10*3/MM3 (ref 0–0.05)
IMM GRANULOCYTES NFR BLD AUTO: 0.2 % (ref 0–0.5)
INHALED O2 CONCENTRATION: 44 %
IPAP: 0
LYMPHOCYTES # BLD AUTO: 0.62 10*3/MM3 (ref 0.7–3.1)
LYMPHOCYTES NFR BLD AUTO: 13.5 % (ref 19.6–45.3)
MCH RBC QN AUTO: 31.2 PG (ref 26.6–33)
MCHC RBC AUTO-ENTMCNC: 31.9 G/DL (ref 31.5–35.7)
MCV RBC AUTO: 98.1 FL (ref 79–97)
METHGB BLD QL: 0.3 % (ref 0–1.5)
MODALITY: ABNORMAL
MONOCYTES # BLD AUTO: 0.45 10*3/MM3 (ref 0.1–0.9)
MONOCYTES NFR BLD AUTO: 9.8 % (ref 5–12)
NEUTROPHILS NFR BLD AUTO: 3.5 10*3/MM3 (ref 1.7–7)
NEUTROPHILS NFR BLD AUTO: 76.1 % (ref 42.7–76)
NOTE: ABNORMAL
NRBC BLD AUTO-RTO: 0 /100 WBC (ref 0–0.2)
NT-PROBNP SERPL-MCNC: 1414 PG/ML (ref 0–1800)
OXYHGB MFR BLDV: 96.9 % (ref 94–99)
PAW @ PEAK INSP FLOW SETTING VENT: 0 CMH2O
PCO2 BLDA: 51.4 MM HG (ref 35–45)
PCO2 TEMP ADJ BLD: 51.4 MM HG (ref 35–48)
PH BLDA: 7.37 PH UNITS (ref 7.35–7.45)
PH, TEMP CORRECTED: 7.37 PH UNITS
PLATELET # BLD AUTO: 155 10*3/MM3 (ref 140–450)
PMV BLD AUTO: 10 FL (ref 6–12)
PO2 BLDA: 107 MM HG (ref 83–108)
PO2 TEMP ADJ BLD: 107 MM HG (ref 83–108)
POTASSIUM SERPL-SCNC: 3.5 MMOL/L (ref 3.5–5.2)
PROT SERPL-MCNC: 8.1 G/DL (ref 6–8.5)
RBC # BLD AUTO: 4.13 10*6/MM3 (ref 4.14–5.8)
SARS-COV-2 RNA RESP QL NAA+PROBE: NOT DETECTED
SODIUM SERPL-SCNC: 138 MMOL/L (ref 136–145)
TOTAL RATE: 0 BREATHS/MINUTE
TROPONIN T SERPL-MCNC: <0.01 NG/ML (ref 0–0.03)
WBC NRBC COR # BLD: 4.6 10*3/MM3 (ref 3.4–10.8)
WHOLE BLOOD HOLD COAG: NORMAL
WHOLE BLOOD HOLD SPECIMEN: NORMAL

## 2022-12-12 PROCEDURE — 94640 AIRWAY INHALATION TREATMENT: CPT

## 2022-12-12 PROCEDURE — 82805 BLOOD GASES W/O2 SATURATION: CPT

## 2022-12-12 PROCEDURE — 87636 SARSCOV2 & INF A&B AMP PRB: CPT | Performed by: EMERGENCY MEDICINE

## 2022-12-12 PROCEDURE — 94660 CPAP INITIATION&MGMT: CPT

## 2022-12-12 PROCEDURE — 94799 UNLISTED PULMONARY SVC/PX: CPT

## 2022-12-12 PROCEDURE — 83605 ASSAY OF LACTIC ACID: CPT | Performed by: NURSE PRACTITIONER

## 2022-12-12 PROCEDURE — 71045 X-RAY EXAM CHEST 1 VIEW: CPT

## 2022-12-12 PROCEDURE — 99223 1ST HOSP IP/OBS HIGH 75: CPT | Performed by: INTERNAL MEDICINE

## 2022-12-12 PROCEDURE — 99285 EMERGENCY DEPT VISIT HI MDM: CPT

## 2022-12-12 PROCEDURE — 80053 COMPREHEN METABOLIC PANEL: CPT

## 2022-12-12 PROCEDURE — 85025 COMPLETE CBC W/AUTO DIFF WBC: CPT

## 2022-12-12 PROCEDURE — 82375 ASSAY CARBOXYHB QUANT: CPT

## 2022-12-12 PROCEDURE — 0 IOPAMIDOL PER 1 ML: Performed by: EMERGENCY MEDICINE

## 2022-12-12 PROCEDURE — 71275 CT ANGIOGRAPHY CHEST: CPT

## 2022-12-12 PROCEDURE — 83050 HGB METHEMOGLOBIN QUAN: CPT

## 2022-12-12 PROCEDURE — 25010000002 FUROSEMIDE PER 20 MG: Performed by: NURSE PRACTITIONER

## 2022-12-12 PROCEDURE — 84484 ASSAY OF TROPONIN QUANT: CPT

## 2022-12-12 PROCEDURE — 36600 WITHDRAWAL OF ARTERIAL BLOOD: CPT

## 2022-12-12 PROCEDURE — 93005 ELECTROCARDIOGRAM TRACING: CPT

## 2022-12-12 PROCEDURE — 93005 ELECTROCARDIOGRAM TRACING: CPT | Performed by: EMERGENCY MEDICINE

## 2022-12-12 PROCEDURE — 83880 ASSAY OF NATRIURETIC PEPTIDE: CPT

## 2022-12-12 RX ORDER — SODIUM CHLORIDE 0.9 % (FLUSH) 0.9 %
10 SYRINGE (ML) INJECTION AS NEEDED
Status: DISCONTINUED | OUTPATIENT
Start: 2022-12-12 | End: 2022-12-16 | Stop reason: HOSPADM

## 2022-12-12 RX ORDER — IPRATROPIUM BROMIDE AND ALBUTEROL SULFATE 2.5; .5 MG/3ML; MG/3ML
3 SOLUTION RESPIRATORY (INHALATION) ONCE
Status: COMPLETED | OUTPATIENT
Start: 2022-12-12 | End: 2022-12-12

## 2022-12-12 RX ORDER — FINASTERIDE 5 MG/1
5 TABLET, FILM COATED ORAL
Qty: 90 TABLET | Refills: 1 | Status: SHIPPED | OUTPATIENT
Start: 2022-12-12 | End: 2023-01-09 | Stop reason: SDUPTHER

## 2022-12-12 RX ORDER — ALLOPURINOL 300 MG/1
300 TABLET ORAL DAILY
Qty: 90 TABLET | Refills: 1 | Status: SHIPPED | OUTPATIENT
Start: 2022-12-12 | End: 2023-01-09 | Stop reason: SDUPTHER

## 2022-12-12 RX ORDER — FUROSEMIDE 10 MG/ML
40 INJECTION INTRAMUSCULAR; INTRAVENOUS ONCE
Status: COMPLETED | OUTPATIENT
Start: 2022-12-12 | End: 2022-12-12

## 2022-12-12 RX ADMIN — IOPAMIDOL 85 ML: 755 INJECTION, SOLUTION INTRAVENOUS at 20:52

## 2022-12-12 RX ADMIN — IPRATROPIUM BROMIDE AND ALBUTEROL SULFATE 3 ML: 2.5; .5 SOLUTION RESPIRATORY (INHALATION) at 21:04

## 2022-12-12 RX ADMIN — FUROSEMIDE 40 MG: 10 INJECTION, SOLUTION INTRAMUSCULAR; INTRAVENOUS at 20:11

## 2022-12-13 LAB
ANION GAP SERPL CALCULATED.3IONS-SCNC: 11 MMOL/L (ref 5–15)
BASOPHILS # BLD AUTO: 0.01 10*3/MM3 (ref 0–0.2)
BASOPHILS NFR BLD AUTO: 0.2 % (ref 0–1.5)
BUN SERPL-MCNC: 20 MG/DL (ref 8–23)
BUN/CREAT SERPL: 26.7 (ref 7–25)
CALCIUM SPEC-SCNC: 8.8 MG/DL (ref 8.6–10.5)
CHLORIDE SERPL-SCNC: 98 MMOL/L (ref 98–107)
CO2 SERPL-SCNC: 30 MMOL/L (ref 22–29)
CREAT SERPL-MCNC: 0.75 MG/DL (ref 0.76–1.27)
DEPRECATED RDW RBC AUTO: 48.9 FL (ref 37–54)
EGFRCR SERPLBLD CKD-EPI 2021: 87.9 ML/MIN/1.73
EOSINOPHIL # BLD AUTO: 0 10*3/MM3 (ref 0–0.4)
EOSINOPHIL NFR BLD AUTO: 0 % (ref 0.3–6.2)
ERYTHROCYTE [DISTWIDTH] IN BLOOD BY AUTOMATED COUNT: 13.8 % (ref 12.3–15.4)
GLUCOSE BLDC GLUCOMTR-MCNC: 112 MG/DL (ref 70–130)
GLUCOSE BLDC GLUCOMTR-MCNC: 132 MG/DL (ref 70–130)
GLUCOSE BLDC GLUCOMTR-MCNC: 164 MG/DL (ref 70–130)
GLUCOSE SERPL-MCNC: 124 MG/DL (ref 65–99)
HBA1C MFR BLD: 5.1 % (ref 4.8–5.6)
HCT VFR BLD AUTO: 37.6 % (ref 37.5–51)
HGB BLD-MCNC: 12.1 G/DL (ref 13–17.7)
IMM GRANULOCYTES # BLD AUTO: 0.02 10*3/MM3 (ref 0–0.05)
IMM GRANULOCYTES NFR BLD AUTO: 0.3 % (ref 0–0.5)
L PNEUMO1 AG UR QL IA: NEGATIVE
LYMPHOCYTES # BLD AUTO: 0.59 10*3/MM3 (ref 0.7–3.1)
LYMPHOCYTES NFR BLD AUTO: 9.7 % (ref 19.6–45.3)
MCH RBC QN AUTO: 31 PG (ref 26.6–33)
MCHC RBC AUTO-ENTMCNC: 32.2 G/DL (ref 31.5–35.7)
MCV RBC AUTO: 96.4 FL (ref 79–97)
MONOCYTES # BLD AUTO: 0.62 10*3/MM3 (ref 0.1–0.9)
MONOCYTES NFR BLD AUTO: 10.2 % (ref 5–12)
NEUTROPHILS NFR BLD AUTO: 4.83 10*3/MM3 (ref 1.7–7)
NEUTROPHILS NFR BLD AUTO: 79.6 % (ref 42.7–76)
NRBC BLD AUTO-RTO: 0 /100 WBC (ref 0–0.2)
PLATELET # BLD AUTO: 166 10*3/MM3 (ref 140–450)
PMV BLD AUTO: 9.8 FL (ref 6–12)
POTASSIUM SERPL-SCNC: 3.2 MMOL/L (ref 3.5–5.2)
POTASSIUM SERPL-SCNC: 4 MMOL/L (ref 3.5–5.2)
QT INTERVAL: 350 MS
QTC INTERVAL: 469 MS
RBC # BLD AUTO: 3.9 10*6/MM3 (ref 4.14–5.8)
S PNEUM AG SPEC QL LA: NEGATIVE
SODIUM SERPL-SCNC: 139 MMOL/L (ref 136–145)
WBC NRBC COR # BLD: 6.07 10*3/MM3 (ref 3.4–10.8)

## 2022-12-13 PROCEDURE — 85025 COMPLETE CBC W/AUTO DIFF WBC: CPT | Performed by: NURSE PRACTITIONER

## 2022-12-13 PROCEDURE — 80048 BASIC METABOLIC PNL TOTAL CA: CPT | Performed by: NURSE PRACTITIONER

## 2022-12-13 PROCEDURE — 99232 SBSQ HOSP IP/OBS MODERATE 35: CPT | Performed by: STUDENT IN AN ORGANIZED HEALTH CARE EDUCATION/TRAINING PROGRAM

## 2022-12-13 PROCEDURE — 87899 AGENT NOS ASSAY W/OPTIC: CPT | Performed by: NURSE PRACTITIONER

## 2022-12-13 PROCEDURE — 83036 HEMOGLOBIN GLYCOSYLATED A1C: CPT | Performed by: NURSE PRACTITIONER

## 2022-12-13 PROCEDURE — 63710000001 INSULIN LISPRO (HUMAN) PER 5 UNITS: Performed by: NURSE PRACTITIONER

## 2022-12-13 PROCEDURE — 87040 BLOOD CULTURE FOR BACTERIA: CPT

## 2022-12-13 PROCEDURE — 94660 CPAP INITIATION&MGMT: CPT

## 2022-12-13 PROCEDURE — 87449 NOS EACH ORGANISM AG IA: CPT | Performed by: NURSE PRACTITIONER

## 2022-12-13 PROCEDURE — 82962 GLUCOSE BLOOD TEST: CPT

## 2022-12-13 PROCEDURE — 84132 ASSAY OF SERUM POTASSIUM: CPT | Performed by: STUDENT IN AN ORGANIZED HEALTH CARE EDUCATION/TRAINING PROGRAM

## 2022-12-13 PROCEDURE — 94799 UNLISTED PULMONARY SVC/PX: CPT

## 2022-12-13 PROCEDURE — 25010000002 FUROSEMIDE PER 20 MG: Performed by: STUDENT IN AN ORGANIZED HEALTH CARE EDUCATION/TRAINING PROGRAM

## 2022-12-13 RX ORDER — SODIUM CHLORIDE 0.9 % (FLUSH) 0.9 %
10 SYRINGE (ML) INJECTION AS NEEDED
Status: DISCONTINUED | OUTPATIENT
Start: 2022-12-13 | End: 2022-12-16 | Stop reason: HOSPADM

## 2022-12-13 RX ORDER — PANTOPRAZOLE SODIUM 40 MG/1
40 TABLET, DELAYED RELEASE ORAL DAILY
Status: DISCONTINUED | OUTPATIENT
Start: 2022-12-13 | End: 2022-12-16 | Stop reason: HOSPADM

## 2022-12-13 RX ORDER — ALLOPURINOL 300 MG/1
300 TABLET ORAL DAILY
Status: DISCONTINUED | OUTPATIENT
Start: 2022-12-13 | End: 2022-12-16 | Stop reason: HOSPADM

## 2022-12-13 RX ORDER — VALSARTAN 160 MG/1
80 TABLET ORAL DAILY
Status: DISCONTINUED | OUTPATIENT
Start: 2022-12-13 | End: 2022-12-16 | Stop reason: HOSPADM

## 2022-12-13 RX ORDER — SODIUM CHLORIDE 9 MG/ML
40 INJECTION, SOLUTION INTRAVENOUS AS NEEDED
Status: DISCONTINUED | OUTPATIENT
Start: 2022-12-13 | End: 2022-12-16 | Stop reason: HOSPADM

## 2022-12-13 RX ORDER — INSULIN LISPRO 100 [IU]/ML
0-7 INJECTION, SOLUTION INTRAVENOUS; SUBCUTANEOUS
Status: DISCONTINUED | OUTPATIENT
Start: 2022-12-13 | End: 2022-12-16 | Stop reason: HOSPADM

## 2022-12-13 RX ORDER — ACETAMINOPHEN 325 MG/1
650 TABLET ORAL EVERY 4 HOURS PRN
Status: DISCONTINUED | OUTPATIENT
Start: 2022-12-13 | End: 2022-12-16 | Stop reason: HOSPADM

## 2022-12-13 RX ORDER — L.ACID,PARA/B.BIFIDUM/S.THERM 8B CELL
1 CAPSULE ORAL DAILY
Status: DISCONTINUED | OUTPATIENT
Start: 2022-12-13 | End: 2022-12-16 | Stop reason: HOSPADM

## 2022-12-13 RX ORDER — METOPROLOL TARTRATE 100 MG/1
100 TABLET ORAL EVERY 12 HOURS SCHEDULED
Status: DISCONTINUED | OUTPATIENT
Start: 2022-12-13 | End: 2022-12-16 | Stop reason: HOSPADM

## 2022-12-13 RX ORDER — ACETAMINOPHEN 650 MG/1
650 SUPPOSITORY RECTAL EVERY 4 HOURS PRN
Status: DISCONTINUED | OUTPATIENT
Start: 2022-12-13 | End: 2022-12-16 | Stop reason: HOSPADM

## 2022-12-13 RX ORDER — NICOTINE POLACRILEX 4 MG
15 LOZENGE BUCCAL
Status: DISCONTINUED | OUTPATIENT
Start: 2022-12-13 | End: 2022-12-16 | Stop reason: HOSPADM

## 2022-12-13 RX ORDER — ACETAMINOPHEN 160 MG/5ML
650 SOLUTION ORAL EVERY 4 HOURS PRN
Status: DISCONTINUED | OUTPATIENT
Start: 2022-12-13 | End: 2022-12-16 | Stop reason: HOSPADM

## 2022-12-13 RX ORDER — SODIUM CHLORIDE 0.9 % (FLUSH) 0.9 %
10 SYRINGE (ML) INJECTION EVERY 12 HOURS SCHEDULED
Status: DISCONTINUED | OUTPATIENT
Start: 2022-12-13 | End: 2022-12-16 | Stop reason: HOSPADM

## 2022-12-13 RX ORDER — FUROSEMIDE 10 MG/ML
20 INJECTION INTRAMUSCULAR; INTRAVENOUS ONCE
Status: COMPLETED | OUTPATIENT
Start: 2022-12-13 | End: 2022-12-13

## 2022-12-13 RX ORDER — NITROGLYCERIN 20 MG/100ML
10-50 INJECTION INTRAVENOUS
Status: DISCONTINUED | OUTPATIENT
Start: 2022-12-13 | End: 2022-12-15

## 2022-12-13 RX ORDER — POTASSIUM CHLORIDE 750 MG/1
40 CAPSULE, EXTENDED RELEASE ORAL AS NEEDED
Status: DISCONTINUED | OUTPATIENT
Start: 2022-12-13 | End: 2022-12-16 | Stop reason: HOSPADM

## 2022-12-13 RX ORDER — PRAVASTATIN SODIUM 40 MG
40 TABLET ORAL DAILY
Status: DISCONTINUED | OUTPATIENT
Start: 2022-12-13 | End: 2022-12-16 | Stop reason: HOSPADM

## 2022-12-13 RX ORDER — AMLODIPINE BESYLATE 10 MG/1
10 TABLET ORAL DAILY
Status: DISCONTINUED | OUTPATIENT
Start: 2022-12-13 | End: 2022-12-16 | Stop reason: HOSPADM

## 2022-12-13 RX ORDER — FERROUS SULFATE 325(65) MG
325 TABLET ORAL
Status: DISCONTINUED | OUTPATIENT
Start: 2022-12-13 | End: 2022-12-16 | Stop reason: HOSPADM

## 2022-12-13 RX ORDER — IPRATROPIUM BROMIDE AND ALBUTEROL SULFATE 2.5; .5 MG/3ML; MG/3ML
3 SOLUTION RESPIRATORY (INHALATION) EVERY 4 HOURS PRN
Status: DISCONTINUED | OUTPATIENT
Start: 2022-12-13 | End: 2022-12-16 | Stop reason: HOSPADM

## 2022-12-13 RX ORDER — TAMSULOSIN HYDROCHLORIDE 0.4 MG/1
0.4 CAPSULE ORAL DAILY
Status: DISCONTINUED | OUTPATIENT
Start: 2022-12-13 | End: 2022-12-16 | Stop reason: HOSPADM

## 2022-12-13 RX ORDER — DOCUSATE SODIUM 100 MG/1
300 CAPSULE, LIQUID FILLED ORAL NIGHTLY
Status: DISCONTINUED | OUTPATIENT
Start: 2022-12-13 | End: 2022-12-16 | Stop reason: HOSPADM

## 2022-12-13 RX ORDER — DEXTROSE MONOHYDRATE 25 G/50ML
25 INJECTION, SOLUTION INTRAVENOUS
Status: DISCONTINUED | OUTPATIENT
Start: 2022-12-13 | End: 2022-12-16 | Stop reason: HOSPADM

## 2022-12-13 RX ORDER — FINASTERIDE 5 MG/1
5 TABLET, FILM COATED ORAL DAILY
Status: DISCONTINUED | OUTPATIENT
Start: 2022-12-13 | End: 2022-12-16 | Stop reason: HOSPADM

## 2022-12-13 RX ORDER — AMIODARONE HYDROCHLORIDE 200 MG/1
100 TABLET ORAL DAILY
Status: DISCONTINUED | OUTPATIENT
Start: 2022-12-13 | End: 2022-12-16 | Stop reason: HOSPADM

## 2022-12-13 RX ORDER — ASCORBIC ACID 500 MG
500 TABLET ORAL DAILY
Status: DISCONTINUED | OUTPATIENT
Start: 2022-12-13 | End: 2022-12-16 | Stop reason: HOSPADM

## 2022-12-13 RX ORDER — POTASSIUM CHLORIDE 1.5 G/1.77G
40 POWDER, FOR SOLUTION ORAL AS NEEDED
Status: DISCONTINUED | OUTPATIENT
Start: 2022-12-13 | End: 2022-12-16 | Stop reason: HOSPADM

## 2022-12-13 RX ORDER — OSELTAMIVIR PHOSPHATE 75 MG/1
75 CAPSULE ORAL EVERY 12 HOURS SCHEDULED
Status: DISCONTINUED | OUTPATIENT
Start: 2022-12-13 | End: 2022-12-16 | Stop reason: HOSPADM

## 2022-12-13 RX ADMIN — AMLODIPINE BESYLATE 10 MG: 10 TABLET ORAL at 08:26

## 2022-12-13 RX ADMIN — METOPROLOL TARTRATE 100 MG: 100 TABLET, FILM COATED ORAL at 00:56

## 2022-12-13 RX ADMIN — TAMSULOSIN HYDROCHLORIDE 0.4 MG: 0.4 CAPSULE ORAL at 08:23

## 2022-12-13 RX ADMIN — ALLOPURINOL 300 MG: 300 TABLET ORAL at 08:23

## 2022-12-13 RX ADMIN — SERTRALINE HYDROCHLORIDE 50 MG: 50 TABLET ORAL at 08:23

## 2022-12-13 RX ADMIN — FUROSEMIDE 20 MG: 10 INJECTION INTRAMUSCULAR; INTRAVENOUS at 16:58

## 2022-12-13 RX ADMIN — Medication 10 ML: at 21:02

## 2022-12-13 RX ADMIN — Medication 1 CAPSULE: at 08:23

## 2022-12-13 RX ADMIN — PANTOPRAZOLE SODIUM 40 MG: 40 TABLET, DELAYED RELEASE ORAL at 08:23

## 2022-12-13 RX ADMIN — OXYCODONE HYDROCHLORIDE AND ACETAMINOPHEN 500 MG: 500 TABLET ORAL at 08:23

## 2022-12-13 RX ADMIN — NITROGLYCERIN 10 MCG/MIN: 20 INJECTION INTRAVENOUS at 00:59

## 2022-12-13 RX ADMIN — APIXABAN 5 MG: 5 TABLET, FILM COATED ORAL at 08:26

## 2022-12-13 RX ADMIN — INSULIN LISPRO 2 UNITS: 100 INJECTION, SOLUTION INTRAVENOUS; SUBCUTANEOUS at 12:01

## 2022-12-13 RX ADMIN — FINASTERIDE 5 MG: 5 TABLET, FILM COATED ORAL at 08:27

## 2022-12-13 RX ADMIN — VALSARTAN 80 MG: 160 TABLET, FILM COATED ORAL at 08:23

## 2022-12-13 RX ADMIN — Medication 10 ML: at 08:28

## 2022-12-13 RX ADMIN — OSELTAMIVIR PHOSPHATE 75 MG: 75 CAPSULE ORAL at 02:45

## 2022-12-13 RX ADMIN — POTASSIUM CHLORIDE 40 MEQ: 750 CAPSULE, EXTENDED RELEASE ORAL at 11:12

## 2022-12-13 RX ADMIN — FERROUS SULFATE TAB 325 MG (65 MG ELEMENTAL FE) 325 MG: 325 (65 FE) TAB at 08:23

## 2022-12-13 RX ADMIN — METOPROLOL TARTRATE 100 MG: 100 TABLET, FILM COATED ORAL at 21:02

## 2022-12-13 RX ADMIN — APIXABAN 5 MG: 5 TABLET, FILM COATED ORAL at 21:02

## 2022-12-13 RX ADMIN — AMIODARONE HYDROCHLORIDE 100 MG: 200 TABLET ORAL at 08:26

## 2022-12-13 RX ADMIN — PRAVASTATIN SODIUM 40 MG: 40 TABLET ORAL at 08:27

## 2022-12-13 RX ADMIN — METOPROLOL TARTRATE 100 MG: 100 TABLET, FILM COATED ORAL at 08:33

## 2022-12-13 RX ADMIN — DOCUSATE SODIUM 300 MG: 100 CAPSULE, LIQUID FILLED ORAL at 21:06

## 2022-12-13 RX ADMIN — Medication 10 ML: at 01:00

## 2022-12-13 RX ADMIN — OSELTAMIVIR PHOSPHATE 75 MG: 75 CAPSULE ORAL at 21:02

## 2022-12-13 RX ADMIN — POTASSIUM CHLORIDE 40 MEQ: 750 CAPSULE, EXTENDED RELEASE ORAL at 15:39

## 2022-12-13 RX ADMIN — OSELTAMIVIR PHOSPHATE 75 MG: 75 CAPSULE ORAL at 08:33

## 2022-12-13 RX ADMIN — ACETAMINOPHEN 650 MG: 325 TABLET ORAL at 00:56

## 2022-12-13 RX ADMIN — DOCUSATE SODIUM 300 MG: 100 CAPSULE, LIQUID FILLED ORAL at 00:56

## 2022-12-13 NOTE — PROGRESS NOTES
[facility] Medicine Services  PROGRESS NOTE    Patient Name: Darien Camarena  : 1935  MRN: [id number]    Date of Admission: 2022  Primary Care Physician: Pito Way MD    Subjective   Subjective     CC:  Dyspnea, flu    HPI:  Reports some respiratory improvement. Now off bipap and on 5L NC    ROS:  Gen- No fevers, chills  CV- No chest pain, palpitations  Resp- + dyspnea  GI- No N/V/D, abd pain         Objective   Objective     Vital Signs:   Temp:  [97.8 °F (36.6 °C)-103 °F (39.4 °C)] 99.2 °F (37.3 °C)  Heart Rate:  [] 51  Resp:  [20-28] 20  BP: (117-210)/() 121/66  Flow (L/min):  [5] 5     Physical Exam:  Constitutional: in mild distress, awake alert  HENT: NCAT, mucous membranes moist  Respiratory: slight rhonchi scattered throughout, on 5L NC w some accessory muscle use  Cardiovascular: RRR, no murmurs, rubs, or gallops  Gastrointestinal: Positive bowel sounds, soft, nontender, nondistended  Musculoskeletal: No bilateral ankle edema  Psychiatric: Appropriate affect, cooperative  Neurologic: Oriented x 3, strength symmetric in all extremities, Cranial Nerves grossly intact to confrontation, speech clear  Skin: No rashes      Results Reviewed:  LAB RESULTS:      Lab 22   WBC 6.07 4.60   HEMOGLOBIN 12.1* 12.9*   HEMATOCRIT 37.6 40.5   PLATELETS 166 155   NEUTROS ABS 4.83 3.50   IMMATURE GRANS (ABS) 0.02 0.01   LYMPHS ABS 0.59* 0.62*   MONOS ABS 0.62 0.45   EOS ABS 0.00 0.01   MCV 96.4 98.1*   LACTATE  --  1.7         Lab 22  0519 22  185   SODIUM 139 138   POTASSIUM 3.2* 3.5   CHLORIDE 98 98   CO2 30.0* 29.0   ANION GAP 11.0 11.0   BUN 20 18   CREATININE 0.75* 0.79   EGFR 87.9 86.5   GLUCOSE 124* 167*   CALCIUM 8.8 8.7   HEMOGLOBIN A1C 5.10  --          Lab 22   TOTAL PROTEIN 8.1   ALBUMIN 4.00   GLOBULIN 4.1   ALT (SGPT) 20   AST (SGOT) 33   BILIRUBIN 0.9   ALK PHOS 118*         Lab 22    PROBNP 1,414.0   TROPONIN T <0.010                 Lab 12/12/22 1950   PH, ARTERIAL 7.367   PCO2, ARTERIAL 51.4*   PO2 .0   FIO2 44   HCO3 ART 29.5*   BASE EXCESS ART 3.2*   CARBOXYHEMOGLOBIN 0.9     Brief Urine Lab Results  (Last result in the past 365 days)      Color   Clarity   Blood   Leuk Est   Nitrite   Protein   CREAT   Urine HCG        08/04/22 1031 Yellow   Cloudy   Trace   Moderate (2+)   Negative   100 mg/dL (2+)                 Microbiology Results Abnormal     Procedure Component Value - Date/Time    S. Pneumo Ag Urine or CSF - Urine, Urine, Clean Catch [[id number]]  (Normal) Collected: 12/13/22 0055    Lab Status: Final result Specimen: Urine, Clean Catch Updated: 12/13/22 1038     Strep Pneumo Ag Negative    Legionella Antigen, Urine - Urine, Urine, Clean Catch [[id number]]  (Normal) Collected: 12/13/22 0055    Lab Status: Final result Specimen: Urine, Clean Catch Updated: 12/13/22 1038     LEGIONELLA ANTIGEN, URINE Negative          XR Chest 1 View    Result Date: 12/12/2022  DATE OF EXAM: 12/12/2022 7:20 PM  PROCEDURE: XR CHEST 1 VW-  INDICATIONS: SOA triage protocol  COMPARISON: No comparisons available.  TECHNIQUE: Single radiographic AP view of the chest was obtained.  FINDINGS: Mild cardiomegaly. Interstitial prominence may represents senescent changes. Bibasilar opacities may represent atelectasis or infection. No pleural effusion or pneumothorax. No acute osseous abnormalities. Visualized upper abdomen is unremarkable..      Impression: Bibasilar opacities may represent atelectasis or infection.  This report was finalized on 12/12/2022 8:56 PM by Alexei Apodaca.      CT Angiogram Chest    Result Date: 12/13/2022  DATE OF EXAM: 12/12/2022 8:35 PM  PROCEDURE: CT ANGIOGRAM CHEST-  INDICATIONS: hypoxia; J96.01-Acute respiratory failure with hypoxia; J10.1-Influenza due to other identified influenza virus with other respiratory manifestations; Z86.39-Personal history of other endocrine,  nutritional and metabolic disease; Z86.79-Personal history of other diseases of the circulatory system  COMPARISON: 6/21/2022  TECHNIQUE: Contiguous axial imaging was obtained from the thoracic inlet through the upper abdomen following the intravenous administration of 85 mL of Isovue 370. Reconstructed coronal and sagittal images were also obtained. Automated exposure control and iterative reconstruction methods were used.  The radiation dose reduction device was turned on for each scan per the ALARA (As Low as Reasonably Achievable) protocol.  FINDINGS: Pulmonary arteries: No evidence of pulmonary embolus. Main pulmonary artery is enlarged measuring 3.3 cm  Heart and pericardium:No flattening of the interventricular septum. Coronary artery calcification is seen. No substantial pericardial effusion.  Vessels:Normal caliber aorta. Atherosclerotic calcification of the aorta. No evidence of acute aortic injury. Venous structures including the superior vena cava and inferior vena cava appear grossly patent.  Mediastinum:Unremarkable appearance of the esophagus. Few enlarged right hilar lymph nodes measuring up to 18 mm in short axis (image 50), previously 13 mm. Multiple prominent mediastinal lymph nodes also seen. No anterior mediastinal masses.  Lower neck:No suspicious or enlarged supraclavicular or axillary lymphadenopathy. Normal appearance of the thyroid.  Pulmonary parenchyma:22 x 19 mm subsolid nodule in the left lower lobe which is similar in size to prior examination but with increasing solid component (image 60). Minimal dependent atelectasis.  Pleura:No evidence of pleural effusion or pneumothorax.  Airways:Central and segmental airways are clear. Diffuse bronchial wall thickening.  Chest wall and bones:No acute osseous abnormality. Degenerative changes of thoracic spine. Unremarkable appearance of soft tissues.  Upper abdomen:No lesion seen within the visualized liver. Left renal pelvocaliectasis.       Impression: No evidence of pulmonary embolism.  Subsolid nodule in the left lower lobe with increasing solid component compared to prior examination concerning for a lesion on the adenocarcinoma spectrum. This can be further evaluated with PET/CT or tissue sampling.  Multiple enlarged right hilar nodes and prominent mediastinal nodes, nonspecific and increased from prior examination.  Left renal pelvocaliectasis which may reflect downstream obstruction. Recommend correlation with any symptoms of left flank pain or urinary symptoms.  Enlarged main pulmonary artery which can be seen in the setting of pulmonary hypertension.  This report was finalized on 12/13/2022 1:08 AM by Alexei Apodaca.        Results for orders placed during the hospital encounter of 08/05/22    Adult Transthoracic Echo Complete W/ Cont if Necessary Per Protocol    Interpretation Summary  · Technically difficult study  · LV systolic function is normal. Estimated LVEF 50%  · Left atrial volume is mildly increased.  · The cardiac valves are anatomically and functionally normal.      I have reviewed the medications:  Scheduled Meds:allopurinol, 300 mg, Oral, Daily  amiodarone, 100 mg, Oral, Daily  amLODIPine, 10 mg, Oral, Daily  apixaban, 5 mg, Oral, Q12H  ascorbic acid, 500 mg, Oral, Daily  docusate sodium, 300 mg, Oral, Nightly  ferrous sulfate, 325 mg, Oral, Daily With Breakfast  finasteride, 5 mg, Oral, Daily  insulin lispro, 0-7 Units, Subcutaneous, TID AC  lactobacillus acidophilus, 1 capsule, Oral, Daily  metoprolol tartrate, 100 mg, Oral, Q12H  oseltamivir, 75 mg, Oral, Q12H  pantoprazole, 40 mg, Oral, Daily  pravastatin, 40 mg, Oral, Daily  sertraline, 50 mg, Oral, Daily  sodium chloride, 10 mL, Intravenous, Q12H  tamsulosin, 0.4 mg, Oral, Daily  valsartan, 80 mg, Oral, Daily      Continuous Infusions:nitroglycerin, 10-50 mcg/min, Last Rate: 15 mcg/min (12/13/22 1102)      PRN Meds:.•  acetaminophen **OR** acetaminophen **OR**  acetaminophen  •  dextrose  •  dextrose  •  glucagon (human recombinant)  •  ipratropium-albuterol  •  potassium chloride  •  potassium chloride  •  sodium chloride  •  sodium chloride  •  sodium chloride    Assessment & Plan   Assessment & Plan     Active Hospital Problems    Diagnosis  POA   • Acute respiratory failure with hypoxia (HCC) [J96.01]  Unknown   • Influenza A [J10.1]  Unknown   • Hypertensive urgency [I16.0]  Unknown   • Type 2 diabetes mellitus (HCC) [E11.9]  Unknown   • 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        Brief Hospital Course to date:  Darien Camarena is a 87 y.o. male w a hx of HTN, STEVEN presenting with worsening dyspnea, found to be in acute respiratory failure with hypoxia secondary to Influenza A.    Acute hypoxemic respiratory failure  Influenza A  - Tamiflu started  - Has been able to be weaned off BiPAP and currently on nasal cannula.  Wean as tolerated.  - Received diuresis in the ED, strict I's and O's  - Clinically continue to assess volume status for diuresis  -CTA of the chest showed no evidence of pulmonary embolism.  It did show a nodule in the left lower lobe which should be further evaluated with PET/CT or tissue sampling.  I discussed this with the patient  - He also has evidence of pulmonary hypertension on imaging  - Continue supportive care      Hypertensive urgency  - Status post IV diuresis  - Currently on nitroglycerin drip which is being titrated, restarted home oral medications    A. fib  - Continue amiodarone, Lopressor and Eliquis    Diabetes  - SSI    Hyperlipidemia  - Continue statin    BPH  - Flomax    GERD  - PPI    Expected  Discharge Location and Transportation: Home expected Discharge Date: 12/17    DVT prophylaxis:  Medical and mechanical DVT prophylaxis orders are present.          CODE STATUS:   Code Status and Medical Interventions:   Ordered at: 12/12/22 7311     Level Of Support Discussed With:    Patient     Code Status (Patient has no pulse and is not breathing):    CPR (Attempt to Resuscitate)     Medical Interventions (Patient has pulse or is breathing):    Full Support       Jeanette Nixon MD  12/13/22

## 2022-12-13 NOTE — PLAN OF CARE
Goal Outcome Evaluation:  Plan of Care Reviewed With: patient        Progress: improving  Outcome Evaluation: Patient admitted from ED with temp,elevated b/p, and soa. Patient diagnosed with flu A and pneumonia. Patient started on ntg gtt for elevated b/p. on 45% bipap, and given tylenol for temp. Tamiflu started.

## 2022-12-13 NOTE — ED PROVIDER NOTES
[place]    EMERGENCY DEPARTMENT ENCOUNTER      Pt Name: Darien Camarena  MRN: [id number]  YOB: 1935  Date of evaluation: 12/12/2022  Provider: Rui Mosquera MD    CHIEF COMPLAINT       Chief Complaint   Patient presents with   • Shortness of Breath         HISTORY OF PRESENT ILLNESS  (Location/Symptom, Timing/Onset, Context/Setting, Quality, Duration, Modifying Factors, Severity.)   Darien Camarena is a 87 y.o. male who presents to the emergency department with progressively worsening moderate severity shortness of breath is worse with exertion has been going on since Saturday.  Patient wears oxygen only at home but is currently requiring 4 L nasal cannula due to saturation in the low 80s on room air.  Patient also had significant wheezing along with cough productive of green sputum.  No fever, chills, or chest pain.      Nursing notes were reviewed.    REVIEW OF SYSTEMS    (2-9 systems for level 4, 10 or more for level 5)   ROS:  General:  No fevers, no chills, no weakness  Cardiovascular:  No chest pain, no palpitations  Respiratory: Shortness of breath, cough  Gastrointestinal:  No pain, no nausea, no vomiting, no diarrhea  Musculoskeletal:  No muscle pain, no joint pain  Skin:  No rash  Neurologic:  No speech problems, no headache, no extremity numbness, no extremity tingling, no extremity weakness  Psychiatric:  No anxiety  Genitourinary:  No dysuria, no hematuria    Except as noted above the remainder of the review of systems was reviewed and negative.       PAST MEDICAL HISTORY     Past Medical History:   Diagnosis Date   • Arrhythmia    • Atrial fibrillation (Formerly [facility])    • Chronic kidney disease    • Diabetes mellitus (Formerly [facility])    • E. coli sepsis (Formerly [facility]) 06/23/2022   • GERD (gastroesophageal reflux disease)    • Gout    • History of colonoscopy 09/12/2012   • Hyperlipidemia    • Hypertension    • STEVEN on CPAP    • PAF (paroxysmal atrial fibrillation) (Formerly [facility])    • Prostatism    • Sleep apnea      CPAP HS         SURGICAL HISTORY       Past Surgical History:   Procedure Laterality Date   • CARDIAC CATHETERIZATION Left 9/29/2022    Procedure: Left Heart Cath;  Surgeon: Titus Oliveros IV, MD;  Location: [facility] INVASIVE LOCATION;  Service: Cardiovascular;  Laterality: Left;   • CARDIOVERSION     • CATARACT EXTRACTION Left    • KIDNEY STONE SURGERY           CURRENT MEDICATIONS     No current facility-administered medications for this encounter.    Current Outpatient Medications:   •  hydroCHLOROthiazide (HYDRODIURIL) 25 MG tablet, Take 0.5 tablets by mouth Daily., Disp: 30 tablet, Rfl: 2  •  oseltamivir (TAMIFLU) 75 MG capsule, Take 1 capsule by mouth Every 12 (Twelve) Hours for 3 doses. Indications: Influenza A, Disp: 3 capsule, Rfl: 0  •  allopurinol (ZYLOPRIM) 300 MG tablet, Take 1 tablet by mouth Daily., Disp: 90 tablet, Rfl: 1  •  amiodarone (PACERONE) 200 MG tablet, Take 0.5 tablets by mouth Daily., Disp: 90 tablet, Rfl: 1  •  amLODIPine (NORVASC) 10 MG tablet, TAKE 1 TABLET EVERY DAY, Disp: 90 tablet, Rfl: 1  •  apixaban (Eliquis) 5 MG tablet tablet, Take 1 tablet by mouth Every 12 (Twelve) Hours., Disp: 180 tablet, Rfl: 1  •  Ascorbic Acid (VITAMIN C) 500 MG capsule, Take 1 tablet by mouth 4 (four) times a day., Disp: , Rfl:   •  docusate sodium (COLACE) 100 MG capsule, Take 300 mg by mouth every night., Disp: , Rfl:   •  Ferrous Gluconate (IRON) 240 (27 FE) MG tablet, Take 1 tablet by mouth daily., Disp: , Rfl:   •  finasteride (PROSCAR) 5 MG tablet, Take 1 tablet by mouth every night at bedtime., Disp: 90 tablet, Rfl: 1  •  metFORMIN (GLUCOPHAGE) 1000 MG tablet, TAKE 1 TABLET TWICE DAILY, Disp: 180 tablet, Rfl: 1  •  metoprolol tartrate (LOPRESSOR) 100 MG tablet, TAKE 1 TABLET EVERY 12 HOURS, Disp: 180 tablet, Rfl: 0  •  omeprazole (priLOSEC) 20 MG capsule, TAKE 1 CAPSULE EVERY DAY, Disp: 90 capsule, Rfl: 1  •  pravastatin (PRAVACHOL) 40 MG tablet, TAKE 1 TABLET EVERY DAY, Disp: 90  tablet, Rfl: 1  •  Probiotic Product (PROBIOTIC ADVANCED PO), Take 1 tablet by mouth 2 (Two) Times a Day., Disp: , Rfl:   •  sertraline (Zoloft) 50 MG tablet, Take 1 tablet by mouth Daily., Disp: 30 tablet, Rfl: 2  •  tamsulosin (FLOMAX) 0.4 MG capsule 24 hr capsule, TAKE 1 CAPSULE EVERY DAY, Disp: 90 capsule, Rfl: 1  •  valsartan (DIOVAN) 80 MG tablet, Take 80 mg by mouth Daily., Disp: , Rfl:     ALLERGIES     Xarelto [rivaroxaban] and Penicillins    FAMILY HISTORY       Family History   Problem Relation Age of Onset   • Alzheimer's disease Mother    • Cancer Father    • COPD Father    • No Known Problems Daughter    • No Known Problems Daughter    • No Known Problems Daughter           SOCIAL HISTORY       Social History     Socioeconomic History   • Marital status:    Tobacco Use   • Smoking status: Former     Types: Cigarettes     Quit date: 1960     Years since quittin.6   • Smokeless tobacco: Never   • Tobacco comments:     started at 15 yo.   Vaping Use   • Vaping Use: Never used   Substance and Sexual Activity   • Alcohol use: No   • Drug use: Never   • Sexual activity: Defer         PHYSICAL EXAM    (up to 7 for level 4, 8 or more for level 5)     Vitals:    22 1121 22 1200 22 1300 22 1400   BP: 152/78      BP Location: Left arm      Patient Position: Lying      Pulse:  61 63 52   Resp: 17      Temp: 97.4 °F (36.3 °C)      TempSrc: Oral      SpO2:  (!) 89% (!) 87% (!) 89%   Weight:       Height:           Physical Exam  General: Awake, alert, no acute distress.  HEENT: Conjunctivae normal.  Neck: Trachea midline.  Cardiac: Heart regular rate, rhythm, no murmurs, rubs, or gallops  Lungs: Tachypnea, diffuse expiratory wheezes  Chest wall: There is no tenderness to palpation over the chest wall or over ribs  Abdomen: Abdomen is soft, nontender, nondistended. There are no firm or pulsatile masses, no rebound rigidity or guarding.   Musculoskeletal: No deformity.  Neuro:  Alert and oriented x 4.  Dermatology: Skin is warm and dry  Psych: Mentation is grossly normal, cognition is grossly normal. Affect is appropriate.        DIAGNOSTIC RESULTS     EKG: All EKGs are interpreted by the Emergency Department Physician who either signs or Co-signs this chart in the absence of a cardiologist.    ECG 12 Lead ED Triage Standing Order; SOA   Final Result   Test Reason : SOB   Blood Pressure :   */*   mmHG   Vent. Rate : 108 BPM     Atrial Rate : 108 BPM      P-R Int : 192 ms          QRS Dur :  96 ms       QT Int : 350 ms       P-R-T Axes :  77  65  84 degrees      QTc Int : 469 ms      Sinus tachycardia   Nonspecific ST abnormality   Abnormal ECG   When compared with ECG of 05-AUG-2022 07:29,   Sinus rhythm has replaced Atrial fibrillation   T wave amplitude has increased in Anterior leads   Confirmed by RUY MARCUS ([id number]) on 12/13/2022 7:56:38 PM      Referred By: ED MD           Confirmed By: RUY MARCUS      SCANNED - TELEMETRY     Final Result          RADIOLOGY:   Non-plain film images such as CT, Ultrasound and MRI are read by the radiologist. Plain radiographic images are visualized and preliminarily interpreted by the emergency physician with the below findings:      [x] Radiologist's Report Reviewed:  CT Angiogram Chest   Final Result   No evidence of pulmonary embolism.       Subsolid nodule in the left lower lobe with increasing solid component   compared to prior examination concerning for a lesion on the   adenocarcinoma spectrum. This can be further evaluated with PET/CT or   tissue sampling.       Multiple enlarged right hilar nodes and prominent mediastinal nodes,   nonspecific and increased from prior examination.       Left renal pelvocaliectasis which may reflect downstream obstruction.   Recommend correlation with any symptoms of left flank pain or urinary   symptoms.       Enlarged main pulmonary artery which can be seen in the setting of   pulmonary hypertension.        This report was finalized on 12/13/2022 1:08 AM by Alexei Apodaca.          XR Chest 1 View   Final Result   Bibasilar opacities may represent atelectasis or infection.       This report was finalized on 12/12/2022 8:56 PM by Alexei Apodaca.                ED BEDSIDE ULTRASOUND:   Performed by ED Physician - none    LABS:    I have reviewed and interpreted all of the currently available lab results from this visit (if applicable):  Results for orders placed or performed during the hospital encounter of 12/12/22   COVID-19 and FLU A/B PCR - Swab, Nasopharynx    Specimen: Nasopharynx; Swab   Result Value Ref Range    COVID19 Not Detected Not Detected - Ref. Range    Influenza A PCR Detected (A) Not Detected    Influenza B PCR Not Detected Not Detected   Blood Culture - Blood, Hand, Right    Specimen: Hand, Right; Blood   Result Value Ref Range    Blood Culture No growth at 5 days    Blood Culture - Blood, Hand, Left    Specimen: Hand, Left; Blood   Result Value Ref Range    Blood Culture No growth at 5 days    Legionella Antigen, Urine - Urine, Urine, Clean Catch    Specimen: Urine, Clean Catch   Result Value Ref Range    LEGIONELLA ANTIGEN, URINE Negative Negative   S. Pneumo Ag Urine or CSF - Urine, Urine, Clean Catch    Specimen: Urine, Clean Catch   Result Value Ref Range    Strep Pneumo Ag Negative Negative   Comprehensive Metabolic Panel    Specimen: Blood   Result Value Ref Range    Glucose 167 (H) 65 - 99 mg/dL    BUN 18 8 - 23 mg/dL    Creatinine 0.79 0.76 - 1.27 mg/dL    Sodium 138 136 - 145 mmol/L    Potassium 3.5 3.5 - 5.2 mmol/L    Chloride 98 98 - 107 mmol/L    CO2 29.0 22.0 - 29.0 mmol/L    Calcium 8.7 8.6 - 10.5 mg/dL    Total Protein 8.1 6.0 - 8.5 g/dL    Albumin 4.00 3.50 - 5.20 g/dL    ALT (SGPT) 20 1 - 41 U/L    AST (SGOT) 33 1 - 40 U/L    Alkaline Phosphatase 118 (H) 39 - 117 U/L    Total Bilirubin 0.9 0.0 - 1.2 mg/dL    Globulin 4.1 gm/dL    A/G Ratio 1.0 g/dL    BUN/Creatinine Ratio  22.8 7.0 - 25.0    Anion Gap 11.0 5.0 - 15.0 mmol/L    eGFR 86.5 >60.0 mL/min/1.73   BNP    Specimen: Blood   Result Value Ref Range    proBNP 1,414.0 0.0 - 1,800.0 pg/mL   Troponin    Specimen: Blood   Result Value Ref Range    Troponin T <0.010 0.000 - 0.030 ng/mL   CBC Auto Differential    Specimen: Blood   Result Value Ref Range    WBC 4.60 3.40 - 10.80 10*3/mm3    RBC 4.13 (L) 4.14 - 5.80 10*6/mm3    Hemoglobin 12.9 (L) 13.0 - 17.7 g/dL    Hematocrit 40.5 37.5 - 51.0 %    MCV 98.1 (H) 79.0 - 97.0 fL    MCH 31.2 26.6 - 33.0 pg    MCHC 31.9 31.5 - 35.7 g/dL    RDW 13.7 12.3 - 15.4 %    RDW-SD 49.5 37.0 - 54.0 fl    MPV 10.0 6.0 - 12.0 fL    Platelets 155 140 - 450 10*3/mm3    Neutrophil % 76.1 (H) 42.7 - 76.0 %    Lymphocyte % 13.5 (L) 19.6 - 45.3 %    Monocyte % 9.8 5.0 - 12.0 %    Eosinophil % 0.2 (L) 0.3 - 6.2 %    Basophil % 0.2 0.0 - 1.5 %    Immature Grans % 0.2 0.0 - 0.5 %    Neutrophils, Absolute 3.50 1.70 - 7.00 10*3/mm3    Lymphocytes, Absolute 0.62 (L) 0.70 - 3.10 10*3/mm3    Monocytes, Absolute 0.45 0.10 - 0.90 10*3/mm3    Eosinophils, Absolute 0.01 0.00 - 0.40 10*3/mm3    Basophils, Absolute 0.01 0.00 - 0.20 10*3/mm3    Immature Grans, Absolute 0.01 0.00 - 0.05 10*3/mm3    nRBC 0.0 0.0 - 0.2 /100 WBC   Lactic Acid, Plasma    Specimen: Blood   Result Value Ref Range    Lactate 1.7 0.5 - 2.0 mmol/L   Blood Gas, Arterial With Co-Ox    Specimen: Arterial Blood   Result Value Ref Range    Site Right Radial     Sam's Test N/A     pH, Arterial 7.367 7.350 - 7.450 pH units    pCO2, Arterial 51.4 (H) 35.0 - 45.0 mm Hg    pO2, Arterial 107.0 83.0 - 108.0 mm Hg    HCO3, Arterial 29.5 (H) 20.0 - 26.0 mmol/L    Base Excess, Arterial 3.2 (H) 0.0 - 2.0 mmol/L    Hemoglobin, Blood Gas 12.8 (L) 13.5 - 17.5 g/dL    Hematocrit, Blood Gas 39.2 38.0 - 51.0 %    Oxyhemoglobin 96.9 94 - 99 %    Methemoglobin 0.30 0.00 - 1.50 %    Carboxyhemoglobin 0.9 0 - 2 %    CO2 Content 31.1 22 - 33 mmol/L    Temperature 37.0 C     Barometric Pressure for Blood Gas      Modality Nasal Cannula     FIO2 44 %    Rate 0 Breaths/minute    PIP 0 cmH2O    IPAP 0     EPAP 0     Note      pH, Temp Corrected 7.367 pH Units    pCO2, Temperature Corrected 51.4 (H) 35 - 48 mm Hg    pO2, Temperature Corrected 107 83 - 108 mm Hg   CBC Auto Differential    Specimen: Blood   Result Value Ref Range    WBC 6.07 3.40 - 10.80 10*3/mm3    RBC 3.90 (L) 4.14 - 5.80 10*6/mm3    Hemoglobin 12.1 (L) 13.0 - 17.7 g/dL    Hematocrit 37.6 37.5 - 51.0 %    MCV 96.4 79.0 - 97.0 fL    MCH 31.0 26.6 - 33.0 pg    MCHC 32.2 31.5 - 35.7 g/dL    RDW 13.8 12.3 - 15.4 %    RDW-SD 48.9 37.0 - 54.0 fl    MPV 9.8 6.0 - 12.0 fL    Platelets 166 140 - 450 10*3/mm3    Neutrophil % 79.6 (H) 42.7 - 76.0 %    Lymphocyte % 9.7 (L) 19.6 - 45.3 %    Monocyte % 10.2 5.0 - 12.0 %    Eosinophil % 0.0 (L) 0.3 - 6.2 %    Basophil % 0.2 0.0 - 1.5 %    Immature Grans % 0.3 0.0 - 0.5 %    Neutrophils, Absolute 4.83 1.70 - 7.00 10*3/mm3    Lymphocytes, Absolute 0.59 (L) 0.70 - 3.10 10*3/mm3    Monocytes, Absolute 0.62 0.10 - 0.90 10*3/mm3    Eosinophils, Absolute 0.00 0.00 - 0.40 10*3/mm3    Basophils, Absolute 0.01 0.00 - 0.20 10*3/mm3    Immature Grans, Absolute 0.02 0.00 - 0.05 10*3/mm3    nRBC 0.0 0.0 - 0.2 /100 WBC   Basic Metabolic Panel    Specimen: Blood   Result Value Ref Range    Glucose 124 (H) 65 - 99 mg/dL    BUN 20 8 - 23 mg/dL    Creatinine 0.75 (L) 0.76 - 1.27 mg/dL    Sodium 139 136 - 145 mmol/L    Potassium 3.2 (L) 3.5 - 5.2 mmol/L    Chloride 98 98 - 107 mmol/L    CO2 30.0 (H) 22.0 - 29.0 mmol/L    Calcium 8.8 8.6 - 10.5 mg/dL    BUN/Creatinine Ratio 26.7 (H) 7.0 - 25.0    Anion Gap 11.0 5.0 - 15.0 mmol/L    eGFR 87.9 >60.0 mL/min/1.73   Hemoglobin A1c    Specimen: Blood   Result Value Ref Range    Hemoglobin A1C 5.10 4.80 - 5.60 %   Potassium    Specimen: Blood   Result Value Ref Range    Potassium 4.0 3.5 - 5.2 mmol/L   Basic Metabolic Panel    Specimen: Blood   Result Value Ref  Range    Glucose 116 (H) 65 - 99 mg/dL    BUN 33 (H) 8 - 23 mg/dL    Creatinine 0.79 0.76 - 1.27 mg/dL    Sodium 137 136 - 145 mmol/L    Potassium 4.4 3.5 - 5.2 mmol/L    Chloride 98 98 - 107 mmol/L    CO2 27.0 22.0 - 29.0 mmol/L    Calcium 8.8 8.6 - 10.5 mg/dL    BUN/Creatinine Ratio 41.8 (H) 7.0 - 25.0    Anion Gap 12.0 5.0 - 15.0 mmol/L    eGFR 86.5 >60.0 mL/min/1.73   CBC Auto Differential    Specimen: Blood   Result Value Ref Range    WBC 4.44 3.40 - 10.80 10*3/mm3    RBC 4.17 4.14 - 5.80 10*6/mm3    Hemoglobin 12.8 (L) 13.0 - 17.7 g/dL    Hematocrit 40.9 37.5 - 51.0 %    MCV 98.1 (H) 79.0 - 97.0 fL    MCH 30.7 26.6 - 33.0 pg    MCHC 31.3 (L) 31.5 - 35.7 g/dL    RDW 13.7 12.3 - 15.4 %    RDW-SD 49.7 37.0 - 54.0 fl    MPV 10.0 6.0 - 12.0 fL    Platelets 180 140 - 450 10*3/mm3    Neutrophil % 66.0 42.7 - 76.0 %    Lymphocyte % 20.3 19.6 - 45.3 %    Monocyte % 12.2 (H) 5.0 - 12.0 %    Eosinophil % 0.5 0.3 - 6.2 %    Basophil % 0.5 0.0 - 1.5 %    Immature Grans % 0.5 0.0 - 0.5 %    Neutrophils, Absolute 2.94 1.70 - 7.00 10*3/mm3    Lymphocytes, Absolute 0.90 0.70 - 3.10 10*3/mm3    Monocytes, Absolute 0.54 0.10 - 0.90 10*3/mm3    Eosinophils, Absolute 0.02 0.00 - 0.40 10*3/mm3    Basophils, Absolute 0.02 0.00 - 0.20 10*3/mm3    Immature Grans, Absolute 0.02 0.00 - 0.05 10*3/mm3    nRBC 0.0 0.0 - 0.2 /100 WBC   Basic Metabolic Panel    Specimen: Blood   Result Value Ref Range    Glucose 117 (H) 65 - 99 mg/dL    BUN 31 (H) 8 - 23 mg/dL    Creatinine 0.70 (L) 0.76 - 1.27 mg/dL    Sodium 138 136 - 145 mmol/L    Potassium 3.9 3.5 - 5.2 mmol/L    Chloride 96 (L) 98 - 107 mmol/L    CO2 33.0 (H) 22.0 - 29.0 mmol/L    Calcium 9.1 8.6 - 10.5 mg/dL    BUN/Creatinine Ratio 44.3 (H) 7.0 - 25.0    Anion Gap 9.0 5.0 - 15.0 mmol/L    eGFR 89.7 >60.0 mL/min/1.73   CBC Auto Differential    Specimen: Blood   Result Value Ref Range    WBC 4.49 3.40 - 10.80 10*3/mm3    RBC 3.92 (L) 4.14 - 5.80 10*6/mm3    Hemoglobin 12.3 (L) 13.0 -  17.7 g/dL    Hematocrit 37.2 (L) 37.5 - 51.0 %    MCV 94.9 79.0 - 97.0 fL    MCH 31.4 26.6 - 33.0 pg    MCHC 33.1 31.5 - 35.7 g/dL    RDW 13.2 12.3 - 15.4 %    RDW-SD 46.0 37.0 - 54.0 fl    MPV 9.9 6.0 - 12.0 fL    Platelets 193 140 - 450 10*3/mm3    Neutrophil % 61.3 42.7 - 76.0 %    Lymphocyte % 27.2 19.6 - 45.3 %    Monocyte % 10.2 5.0 - 12.0 %    Eosinophil % 0.9 0.3 - 6.2 %    Basophil % 0.2 0.0 - 1.5 %    Immature Grans % 0.2 0.0 - 0.5 %    Neutrophils, Absolute 2.75 1.70 - 7.00 10*3/mm3    Lymphocytes, Absolute 1.22 0.70 - 3.10 10*3/mm3    Monocytes, Absolute 0.46 0.10 - 0.90 10*3/mm3    Eosinophils, Absolute 0.04 0.00 - 0.40 10*3/mm3    Basophils, Absolute 0.01 0.00 - 0.20 10*3/mm3    Immature Grans, Absolute 0.01 0.00 - 0.05 10*3/mm3    nRBC 0.0 0.0 - 0.2 /100 WBC   POC Glucose Once    Specimen: Blood   Result Value Ref Range    Glucose 112 70 - 130 mg/dL   POC Glucose Once    Specimen: Blood   Result Value Ref Range    Glucose 164 (H) 70 - 130 mg/dL   POC Glucose Once    Specimen: Blood   Result Value Ref Range    Glucose 132 (H) 70 - 130 mg/dL   POC Glucose Once    Specimen: Blood   Result Value Ref Range    Glucose 92 70 - 130 mg/dL   POC Glucose Once    Specimen: Blood   Result Value Ref Range    Glucose 121 70 - 130 mg/dL   POC Glucose Once    Specimen: Blood   Result Value Ref Range    Glucose 121 70 - 130 mg/dL   POC Glucose Once    Specimen: Blood   Result Value Ref Range    Glucose 133 (H) 70 - 130 mg/dL   POC Glucose Once    Specimen: Blood   Result Value Ref Range    Glucose 200 (H) 70 - 130 mg/dL   POC Glucose Once    Specimen: Blood   Result Value Ref Range    Glucose 108 70 - 130 mg/dL   POC Glucose Once    Specimen: Blood   Result Value Ref Range    Glucose 111 70 - 130 mg/dL   POC Glucose Once    Specimen: Blood   Result Value Ref Range    Glucose 135 (H) 70 - 130 mg/dL   ECG 12 Lead ED Triage Standing Order; SOA   Result Value Ref Range    QT Interval 350 ms    QTC Interval 469 ms   Green  Top (Gel)   Result Value Ref Range    Extra Tube Hold for add-ons.    Lavender Top   Result Value Ref Range    Extra Tube hold for add-on    Gold Top - SST   Result Value Ref Range    Extra Tube Hold for add-ons.    Gray Top   Result Value Ref Range    Extra Tube Hold for add-ons.    Light Blue Top   Result Value Ref Range    Extra Tube Hold for add-ons.         All other labs were within normal range or not returned as of this dictation.      EMERGENCY DEPARTMENT COURSE and DIFFERENTIAL DIAGNOSIS/MDM:   Vitals:    Vitals:    12/16/22 1121 12/16/22 1200 12/16/22 1300 12/16/22 1400   BP: 152/78      BP Location: Left arm      Patient Position: Lying      Pulse:  61 63 52   Resp: 17      Temp: 97.4 °F (36.3 °C)      TempSrc: Oral      SpO2:  (!) 89% (!) 87% (!) 89%   Weight:       Height:           ED Course as of 12/18/22 1956   Mon Dec 12, 2022   2003 Spoke w/ Dr. Escalera  who accepts the pt for admission [NS]      ED Course User Index  [NS] Rui Mosquera MD       Presentation consistent with acute hypoxic respiratory failure secondary to influenza pneumonia without any evidence of PE, lobar pneumonia, pneumothorax, pericardial effusion, ACS based on laboratory testing and imaging studies.  Significantly improved with administration of oxygen, nebs in the ED.  Patient will be admitted for further treatment.      MEDICATIONS ADMINISTERED IN ED:  Medications   furosemide (LASIX) injection 40 mg (40 mg Intravenous Given 12/12/22 2011)   ipratropium-albuterol (DUO-NEB) nebulizer solution 3 mL (3 mL Nebulization Given 12/12/22 2104)   iopamidol (ISOVUE-370) 76 % injection 100 mL (85 mL Intravenous Given 12/12/22 2052)   furosemide (LASIX) injection 20 mg (20 mg Intravenous Given 12/13/22 1658)         CRITICAL CARE TIME    Approximately 35 minutes of discontinuous critical care time was provided to this patient by myself absent of any time spent performing procedures.  Patient presents critically ill with acute hypoxic  respiratory failure secondary to influenza pneumonia placed in the cardiovascular, respiratory, neurologic systems at risk requiring the following interventions: BiPAP therapy, nebs, interpretation of lab/ECG/imaging, frequent reassessment, coordination of admission with the following response: Resolution of hypoxia.  Patient at high risk of deterioration and possibly death without these interventions.        FINAL IMPRESSION      1. Acute respiratory failure with hypoxia (HCC)    2. Influenza A    3. History of diabetes mellitus    4. History of atrial fibrillation          DISPOSITION/PLAN     ED Disposition     ED Disposition   Decision to Admit    Condition   --    Comment   Level of Care: Telemetry [5]   Diagnosis: Acute respiratory failure with hypoxia (HCC) [407669]   Admitting Physician: AVELINO GUZMAN [[id number]]   Attending Physician: AVELINO GUZMAN [[id number]]   Isolate for COVID?: No [0]   Certification: I Certify That Inpatient Hospital Services Are Medically Necessary For Greater Than 2 Midnights                   Rui Mosquera MD  Attending Emergency Physician               Rui Mosquera MD  12/18/22 1957

## 2022-12-13 NOTE — H&P
[facility] Medicine Services  HISTORY AND PHYSICAL    Patient Name: Darien Camarena  : 1935  MRN: [id number]  Primary Care Physician: Pito Way MD  Date of admission: 2022    Subjective   Subjective     Chief Complaint:  Shortness of breath    HPI:  Darien Camarena is a 87 y.o. male past medical history significant for paroxysmal atrial fibrillation on Eliquis, CKD stage III, GERD, essential pretension, hyperlipidemia, STEVEN on CPAP and diabetes mellitus type 2 presents to the ED accompanied by daughter due to worsening shortness of breath.  Patient reports since Saturday he has had progressive shortness of air.  He presented to [facility] urgent care today and was noted to be hypoxic with room air oxygen saturation of 82%.  He was placed on 2 L of oxygen via nasal cannula with O2 sat of 92% and EMS was called.  Patient denies any known fever, chills, nausea, vomiting, chest pain, dysuria, diarrhea or known ill contacts.  He does report productive cough with frothy sputum.  While at bedside patient is tachypneic and hypoxic.  His heart rate is 120, O2 sat is 88% on 5 L via nasal cannula.  Patient was placed on BiPAP with 40 of Lasix given.  COVID/flu PCR returned positive for flu A.  Patient is currently on 50% FiO2 BiPAP with oxygen saturation 98%.  CTA of chest is pending.  Patient will be admitted to [facility] under the care of the Hospitalist further evaluation and treatment.        Review of Systems   Constitutional: Positive for appetite change and fever. Negative for chills, diaphoresis, fatigue and unexpected weight change.   HENT: Negative.    Eyes: Negative for photophobia and visual disturbance.   Respiratory: Positive for cough and shortness of breath.    Cardiovascular: Negative for chest pain, palpitations and leg swelling.   Gastrointestinal: Negative for abdominal distention, abdominal pain, blood in stool, constipation,  diarrhea, nausea and vomiting.   Genitourinary: Negative.    Musculoskeletal: Negative for neck pain and neck stiffness.   Skin: Negative.    Neurological: Negative.    Psychiatric/Behavioral: Negative.              Personal History     Past Medical History:   Diagnosis Date   • Arrhythmia    • Atrial fibrillation (HCC)    • Chronic kidney disease    • Diabetes mellitus (HCC)    • E. coli sepsis (HCC) 06/23/2022   • GERD (gastroesophageal reflux disease)    • Gout    • History of colonoscopy 09/12/2012   • Hyperlipidemia    • Hypertension    • STEVEN on CPAP    • PAF (paroxysmal atrial fibrillation) (HCC)    • Prostatism    • Sleep apnea     CPAP HS             Past Surgical History:   Procedure Laterality Date   • CARDIAC CATHETERIZATION Left 9/29/2022    Procedure: Left Heart Cath;  Surgeon: Titus Oliveros IV, MD;  Location: [facility] CATH INVASIVE LOCATION;  Service: Cardiovascular;  Laterality: Left;   • CARDIOVERSION     • CATARACT EXTRACTION Left    • KIDNEY STONE SURGERY         Family History:  family history includes Alzheimer's disease in his mother; COPD in his father; Cancer in his father; No Known Problems in his daughter, daughter, and daughter. Otherwise pertinent FHx was reviewed and unremarkable.     Social History:  reports that he quit smoking about 62 years ago. His smoking use included cigarettes. He has never used smokeless tobacco. He reports that he does not drink alcohol and does not use drugs.  Social History     Social History Narrative    Caffeine: 1 cup of decaff coffee daily        Medications:  Iron, Probiotic Product, Vitamin C, allopurinol, amLODIPine, amiodarone, apixaban, docusate sodium, finasteride, furosemide, metFORMIN, metoprolol tartrate, omeprazole, pravastatin, sertraline, tamsulosin, and valsartan    Allergies   Allergen Reactions   • Xarelto [Rivaroxaban] GI Bleeding     GI bleed   • Penicillins Hives     Has tolerated cefepime, ceftriaxone       Objective    Objective     Vital Signs:   Temp:  [98.3 °F (36.8 °C)-101.6 °F (38.7 °C)] 101.6 °F (38.7 °C)  Heart Rate:  [] 120  Resp:  [28] 28  BP: (192-210)/(111-135) 192/111    Physical Exam  Vitals and nursing note reviewed.   Constitutional:       General: He is in acute distress.      Appearance: He is ill-appearing. He is not toxic-appearing.      Comments: Patient sitting up on the side of bed tachypneic, tachycardic and hypoxic   HENT:      Head: Normocephalic and atraumatic.      Nose: Nose normal.      Mouth/Throat:      Mouth: Mucous membranes are dry.      Pharynx: Oropharynx is clear.   Eyes:      Extraocular Movements: Extraocular movements intact.      Conjunctiva/sclera: Conjunctivae normal.      Pupils: Pupils are equal, round, and reactive to light.   Cardiovascular:      Rate and Rhythm: Regular rhythm. Tachycardia present.      Pulses: Normal pulses.      Heart sounds: Normal heart sounds.   Pulmonary:      Effort: Pulmonary effort is normal.      Breath sounds: Wheezing, rhonchi and rales present.   Abdominal:      General: Bowel sounds are normal. There is no distension.      Palpations: Abdomen is soft. There is no mass.      Tenderness: There is no abdominal tenderness. There is no right CVA tenderness, left CVA tenderness, guarding or rebound.      Hernia: No hernia is present.   Musculoskeletal:         General: No swelling, tenderness, deformity or signs of injury. Normal range of motion.      Cervical back: Normal range of motion and neck supple.      Right lower leg: No edema.      Left lower leg: No edema.   Skin:     General: Skin is warm and dry.   Neurological:      General: No focal deficit present.      Mental Status: He is alert and oriented to person, place, and time. Mental status is at baseline.   Psychiatric:         Mood and Affect: Mood normal.         Behavior: Behavior normal.         Thought Content: Thought content normal.         Judgment: Judgment normal.          Result  Review:  I have personally reviewed the results from the time of this admission to 12/12/2022 20:53 EST and agree with these findings:  []  Laboratory list / accordion  [x]  Microbiology  [x]  Radiology  [x]  EKG/Telemetry   []  Cardiology/Vascular   []  Pathology  []  Old records  []  Other:  Most notable findings include:     LAB RESULTS:      Lab 12/12/22 1857   WBC 4.60   HEMOGLOBIN 12.9*   HEMATOCRIT 40.5   PLATELETS 155   NEUTROS ABS 3.50   IMMATURE GRANS (ABS) 0.01   LYMPHS ABS 0.62*   MONOS ABS 0.45   EOS ABS 0.01   MCV 98.1*   LACTATE 1.7         Lab 12/12/22 1857   SODIUM 138   POTASSIUM 3.5   CHLORIDE 98   CO2 29.0   ANION GAP 11.0   BUN 18   CREATININE 0.79   EGFR 86.5   GLUCOSE 167*   CALCIUM 8.7         Lab 12/12/22 1857   TOTAL PROTEIN 8.1   ALBUMIN 4.00   GLOBULIN 4.1   ALT (SGPT) 20   AST (SGOT) 33   BILIRUBIN 0.9   ALK PHOS 118*         Lab 12/12/22 1857   PROBNP 1,414.0   TROPONIN T <0.010                 Lab 12/12/22  1950   PH, ARTERIAL 7.367   PCO2, ARTERIAL 51.4*   PO2 .0   FIO2 44   HCO3 ART 29.5*   BASE EXCESS ART 3.2*   CARBOXYHEMOGLOBIN 0.9     Brief Urine Lab Results  (Last result in the past 365 days)      Color   Clarity   Blood   Leuk Est   Nitrite   Protein   CREAT   Urine HCG        08/04/22 1031 Yellow   Cloudy   Trace   Moderate (2+)   Negative   100 mg/dL (2+)               Microbiology Results (last 10 days)     Procedure Component Value - Date/Time    COVID PRE-OP / PRE-PROCEDURE SCREENING ORDER (NO ISOLATION) - Swab, Nasopharynx [[id number]]  (Abnormal) Collected: 12/12/22 1850    Lab Status: Final result Specimen: Swab from Nasopharynx Updated: 12/12/22 1954    Narrative:      The following orders were created for panel order COVID PRE-OP / PRE-PROCEDURE SCREENING ORDER (NO ISOLATION) - Swab, Nasopharynx.  Procedure                               Abnormality         Status                     ---------                               -----------         ------                      COVID-19 and FLU A/B PCR...[[id number]]  Abnormal            Final result                 Please view results for these tests on the individual orders.    COVID-19 and FLU A/B PCR - Swab, Nasopharynx [[id number]]  (Abnormal) Collected: 12/12/22 1850    Lab Status: Final result Specimen: Swab from Nasopharynx Updated: 12/12/22 1954     COVID19 Not Detected     Influenza A PCR Detected     Influenza B PCR Not Detected    Narrative:      Fact sheet for providers: https://www.fda.gov/media/303600/download    Fact sheet for patients: https://www.fda.gov/media/513975/download    Test performed by PCR.          No radiology results from the last 24 hrs    Results for orders placed during the hospital encounter of 08/05/22    Adult Transthoracic Echo Complete W/ Cont if Necessary Per Protocol    Interpretation Summary  · Technically difficult study  · LV systolic function is normal. Estimated LVEF 50%  · Left atrial volume is mildly increased.  · The cardiac valves are anatomically and functionally normal.      Assessment & Plan   Assessment & Plan       Hyperlipidemia LDL goal <100    Essential hypertension    Obstructive sleep apnea syndrome    Paroxysmal atrial fibrillation (HCC)    Coronary artery disease involving native coronary artery of native heart with angina pectoris (HCC)    Acute respiratory failure with hypoxia (HCC)    Influenza A    Hypertensive urgency    Type 2 diabetes mellitus (HCC)      87-year-old male presents the ED with worsening shortness of breath with since Saturday.     1) acute respiratory failure with hypoxia     Influenza a  -CTA of chest pending  - Influenza PCR positive for flu A  - Start Tamiflu  - Currently on BiPAP  - Continuous pulse ox  - ABG noted above  - Keep O2 sat greater than 90%  -s/p Lasix 40 mg  - Strict I's and O's    2) hypertensive urgency  - BP on arrival 204/119  - Status post 40 mg IV Lasix  - Repeat BP pending  -Add as needed hydralazine for systolic blood  pressure in the 170  -Consider nitro drip if no improvement  - Continue home valsartan, metoprolol, Norvasc    3) paroxysmal atrial fibrillation  - Currently sinus tachycardia with intermittent A. fib  - Follows with Dr. Oliveros  - Last echo noted above  - On amiodarone, Lopressor and Eliquis    4) diabetes mellitus type 2  - Check hemoglobin A1c  - Fingersticks before meals and at bedtime  - Start low-dose sliding scale insulin    5) hyperlipidemia  - On pravastatin    6) BPH  - On Flomax    7) GERD  -PPI         DVT prophylaxis:  eliquis     CODE STATUS:  Full Code       This note has been completed as part of a split-shared workflow.     Signature: Electronically signed by BERYL Lauren, 12/12/22, 9:11 PM EST.      Attending   Admission Attestation       I have performed an independent face-to-face diagnostic evaluation including performing an independent physical examination as documented here.  The documented plan of care above was reviewed and developed with the advanced practice clinician (APC).      Brief Summary Statement:   Darien Camarena is a 87 y.o. male who was seen at  earlier today w/ c/o cough and shortness of breath and was found to be hypoxic w/ sats 83% so was sent to ER for further evaluation.  Pt has been feeling bad since Saturday w/ cough,shortness of breath and chills . Was found to be febrile in ER.  Poor appetite.  No d/c. No vomiting.  Wife notes pt has had very little sleep over last few days.  Pt states he feels much better on bipap.  Wife feels his color has improved.    Remainder of detailed HPI is as noted by APC and has been reviewed and/or edited by me for completeness.    Attending Physical Exam:  Temp:  [98.3 °F (36.8 °C)-101.6 °F (38.7 °C)] 101.6 °F (38.7 °C)  Heart Rate:  [] 104  Resp:  [26-28] 26  BP: (192-210)/(111-135) 192/111     Present for initial evaluation and on follow up pt with decreased work of breathing on BIPAP but still w/ crackles  throughout    Brief Assessment/Plan :  See detailed assessment and plan developed with APC which I have reviewed and/or edited for completeness.    88 y/o male here w/  1,. Acute respiratory failure secondary to influenza A  -CCTA pending  -pt also hypertensive so likely some degree of flash pulmonary edema  -has responded well to bipap and lasix iv  -tamiflu initiated  -tylenol  2. Hypertensive urgency  -somewhat better with lasix IV   -may benefit from NTG gtt    Admission Status: I believe that this patient meets INPATIENT status due to acute respiratory failure.  I feel patient’s risk for adverse outcomes and need for care warrant INPATIENT evaluation and I predict the patient’s care encounter to likely last beyond 2 midnights.    Electronically signed by Milagro Escalera MD, 12/12/22, 10:24 PM EST.      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  Neg for PE.  This resulted in middle of night and have not yet discussed with pt or family.  Will need f/u.

## 2022-12-13 NOTE — CASE MANAGEMENT/SOCIAL WORK
Discharge Planning Assessment  [facility]     Patient Name: Darien Camarena  MRN: [id number]  Today's Date: 12/13/2022    Admit Date: 12/12/2022    Plan: HOME   Discharge Needs Assessment     Row Name 12/13/22 1439       Living Environment    People in Home alone    Current Living Arrangements home    Primary Care Provided by self    Provides Primary Care For no one    Family Caregiver if Needed child(ann), adult    Quality of Family Relationships helpful;involved;supportive    Able to Return to Prior Arrangements yes       Transition Planning    Patient/Family Anticipates Transition to home    Patient/Family Anticipated Services at Transition     Transportation Anticipated family or friend will provide       Discharge Needs Assessment    Readmission Within the Last 30 Days no previous admission in last 30 days    Equipment Currently Used at Home cpap    Concerns to be Addressed no discharge needs identified;denies needs/concerns at this time    Anticipated Changes Related to Illness none    Equipment Needed After Discharge none    Current Discharge Risk chronically ill;lives alone               Discharge Plan     Row Name 12/13/22 8642       Plan    Plan HOME    Patient/Family in Agreement with Plan yes    Plan Comments Met with pt at bedside for DCP.  He resides alone in [facility].  He is independent with ADLs.  Uses a CPAP.  No current HH services.  Confirmed he has Medicare and [employer] insurance with Rx coverage.  Goal is to return home upon DC.  No immediate needs identified/voiced.  CM will cont to follow.    Final Discharge Disposition Code 01 - home or self-care              Continued Care and Services - Admitted Since 12/12/2022    Coordination has not been started for this encounter.       Expected Discharge Date and Time     Expected Discharge Date Expected Discharge Time    Dec 16, 2022          Demographic Summary     Row Name 12/13/22 1439       General Information    Admission Type  inpatient    Referral Source admission list    Reason for Consult discharge planning    Preferred Language English       Contact Information    Permission Granted to Share Info With     Contact Information Obtained for                Functional Status     Row Name 12/13/22 2315       Functional Status    Usual Activity Tolerance good    Current Activity Tolerance good       Functional Status, IADL    Medications independent    Meal Preparation independent    Housekeeping independent    Laundry independent    Shopping independent               Psychosocial    No documentation.                Abuse/Neglect    No documentation.                Legal    No documentation.                Substance Abuse    No documentation.                Patient Forms    No documentation.                   Tammy Dyson RN

## 2022-12-14 LAB
ANION GAP SERPL CALCULATED.3IONS-SCNC: 12 MMOL/L (ref 5–15)
BASOPHILS # BLD AUTO: 0.02 10*3/MM3 (ref 0–0.2)
BASOPHILS NFR BLD AUTO: 0.5 % (ref 0–1.5)
BUN SERPL-MCNC: 33 MG/DL (ref 8–23)
BUN/CREAT SERPL: 41.8 (ref 7–25)
CALCIUM SPEC-SCNC: 8.8 MG/DL (ref 8.6–10.5)
CHLORIDE SERPL-SCNC: 98 MMOL/L (ref 98–107)
CO2 SERPL-SCNC: 27 MMOL/L (ref 22–29)
CREAT SERPL-MCNC: 0.79 MG/DL (ref 0.76–1.27)
DEPRECATED RDW RBC AUTO: 49.7 FL (ref 37–54)
EGFRCR SERPLBLD CKD-EPI 2021: 86.5 ML/MIN/1.73
EOSINOPHIL # BLD AUTO: 0.02 10*3/MM3 (ref 0–0.4)
EOSINOPHIL NFR BLD AUTO: 0.5 % (ref 0.3–6.2)
ERYTHROCYTE [DISTWIDTH] IN BLOOD BY AUTOMATED COUNT: 13.7 % (ref 12.3–15.4)
GLUCOSE BLDC GLUCOMTR-MCNC: 121 MG/DL (ref 70–130)
GLUCOSE BLDC GLUCOMTR-MCNC: 121 MG/DL (ref 70–130)
GLUCOSE BLDC GLUCOMTR-MCNC: 92 MG/DL (ref 70–130)
GLUCOSE SERPL-MCNC: 116 MG/DL (ref 65–99)
HCT VFR BLD AUTO: 40.9 % (ref 37.5–51)
HGB BLD-MCNC: 12.8 G/DL (ref 13–17.7)
IMM GRANULOCYTES # BLD AUTO: 0.02 10*3/MM3 (ref 0–0.05)
IMM GRANULOCYTES NFR BLD AUTO: 0.5 % (ref 0–0.5)
LYMPHOCYTES # BLD AUTO: 0.9 10*3/MM3 (ref 0.7–3.1)
LYMPHOCYTES NFR BLD AUTO: 20.3 % (ref 19.6–45.3)
MCH RBC QN AUTO: 30.7 PG (ref 26.6–33)
MCHC RBC AUTO-ENTMCNC: 31.3 G/DL (ref 31.5–35.7)
MCV RBC AUTO: 98.1 FL (ref 79–97)
MONOCYTES # BLD AUTO: 0.54 10*3/MM3 (ref 0.1–0.9)
MONOCYTES NFR BLD AUTO: 12.2 % (ref 5–12)
NEUTROPHILS NFR BLD AUTO: 2.94 10*3/MM3 (ref 1.7–7)
NEUTROPHILS NFR BLD AUTO: 66 % (ref 42.7–76)
NRBC BLD AUTO-RTO: 0 /100 WBC (ref 0–0.2)
PLATELET # BLD AUTO: 180 10*3/MM3 (ref 140–450)
PMV BLD AUTO: 10 FL (ref 6–12)
POTASSIUM SERPL-SCNC: 4.4 MMOL/L (ref 3.5–5.2)
RBC # BLD AUTO: 4.17 10*6/MM3 (ref 4.14–5.8)
SODIUM SERPL-SCNC: 137 MMOL/L (ref 136–145)
WBC NRBC COR # BLD: 4.44 10*3/MM3 (ref 3.4–10.8)

## 2022-12-14 PROCEDURE — 80048 BASIC METABOLIC PNL TOTAL CA: CPT | Performed by: STUDENT IN AN ORGANIZED HEALTH CARE EDUCATION/TRAINING PROGRAM

## 2022-12-14 PROCEDURE — 82962 GLUCOSE BLOOD TEST: CPT

## 2022-12-14 PROCEDURE — 99232 SBSQ HOSP IP/OBS MODERATE 35: CPT | Performed by: STUDENT IN AN ORGANIZED HEALTH CARE EDUCATION/TRAINING PROGRAM

## 2022-12-14 PROCEDURE — 99222 1ST HOSP IP/OBS MODERATE 55: CPT | Performed by: INTERNAL MEDICINE

## 2022-12-14 PROCEDURE — 85025 COMPLETE CBC W/AUTO DIFF WBC: CPT | Performed by: STUDENT IN AN ORGANIZED HEALTH CARE EDUCATION/TRAINING PROGRAM

## 2022-12-14 RX ORDER — HYDROCHLOROTHIAZIDE 25 MG/1
25 TABLET ORAL DAILY
Status: DISCONTINUED | OUTPATIENT
Start: 2022-12-14 | End: 2022-12-16 | Stop reason: HOSPADM

## 2022-12-14 RX ADMIN — PANTOPRAZOLE SODIUM 40 MG: 40 TABLET, DELAYED RELEASE ORAL at 10:27

## 2022-12-14 RX ADMIN — VALSARTAN 80 MG: 160 TABLET, FILM COATED ORAL at 10:28

## 2022-12-14 RX ADMIN — OXYCODONE HYDROCHLORIDE AND ACETAMINOPHEN 500 MG: 500 TABLET ORAL at 10:26

## 2022-12-14 RX ADMIN — AMLODIPINE BESYLATE 10 MG: 10 TABLET ORAL at 10:27

## 2022-12-14 RX ADMIN — APIXABAN 5 MG: 5 TABLET, FILM COATED ORAL at 10:27

## 2022-12-14 RX ADMIN — OSELTAMIVIR PHOSPHATE 75 MG: 75 CAPSULE ORAL at 21:50

## 2022-12-14 RX ADMIN — SERTRALINE HYDROCHLORIDE 50 MG: 50 TABLET ORAL at 10:28

## 2022-12-14 RX ADMIN — TAMSULOSIN HYDROCHLORIDE 0.4 MG: 0.4 CAPSULE ORAL at 10:27

## 2022-12-14 RX ADMIN — HYDROCHLOROTHIAZIDE 25 MG: 25 TABLET ORAL at 18:01

## 2022-12-14 RX ADMIN — FINASTERIDE 5 MG: 5 TABLET, FILM COATED ORAL at 10:28

## 2022-12-14 RX ADMIN — ALLOPURINOL 300 MG: 300 TABLET ORAL at 10:26

## 2022-12-14 RX ADMIN — Medication 1 CAPSULE: at 10:26

## 2022-12-14 RX ADMIN — AMIODARONE HYDROCHLORIDE 100 MG: 200 TABLET ORAL at 10:27

## 2022-12-14 RX ADMIN — OSELTAMIVIR PHOSPHATE 75 MG: 75 CAPSULE ORAL at 10:29

## 2022-12-14 RX ADMIN — Medication 10 ML: at 21:50

## 2022-12-14 RX ADMIN — METOPROLOL TARTRATE 100 MG: 100 TABLET, FILM COATED ORAL at 21:50

## 2022-12-14 RX ADMIN — METOPROLOL TARTRATE 100 MG: 100 TABLET, FILM COATED ORAL at 10:27

## 2022-12-14 RX ADMIN — FERROUS SULFATE TAB 325 MG (65 MG ELEMENTAL FE) 325 MG: 325 (65 FE) TAB at 10:28

## 2022-12-14 RX ADMIN — PRAVASTATIN SODIUM 40 MG: 40 TABLET ORAL at 10:28

## 2022-12-14 RX ADMIN — APIXABAN 5 MG: 5 TABLET, FILM COATED ORAL at 21:50

## 2022-12-14 RX ADMIN — Medication 10 ML: at 10:28

## 2022-12-14 NOTE — PROGRESS NOTES
[facility] Medicine Services  PROGRESS NOTE    Patient Name: Darien Camarena  : 1935  MRN: [id number]    Date of Admission: 2022  Primary Care Physician: Pito Way MD    Subjective   Subjective     CC:  Dyspnea, flu    HPI:  Sitting in chair, watching TV, no complaints. D/w Daughter today    ROS:  Gen- No fevers, chills  CV- No chest pain, palpitations  Resp- + dyspnea  GI- No N/V/D, abd pain         Objective   Objective     Vital Signs:   Temp:  [97.7 °F (36.5 °C)-99 °F (37.2 °C)] 98.7 °F (37.1 °C)  Heart Rate:  [43-63] 51  Resp:  [18-22] 18  BP: (117-159)/(65-89) 134/75  Flow (L/min):  [3-4] 3     Physical Exam:  Constitutional: in mild distress, awake alert  HENT: NCAT, mucous membranes moist  Respiratory: slight rhonchi scattered throughout, on 5L NC w some accessory muscle use  Cardiovascular: RRR, no murmurs, rubs, or gallops  Gastrointestinal: Positive bowel sounds, soft, nontender, nondistended  Musculoskeletal: No bilateral ankle edema  Psychiatric: Appropriate affect, cooperative  Neurologic: Oriented x 3, strength symmetric in all extremities, Cranial Nerves grossly intact to confrontation, speech clear  Skin: No rashes      Results Reviewed:  LAB RESULTS:      Lab 22   WBC 4.44 6.07 4.60   HEMOGLOBIN 12.8* 12.1* 12.9*   HEMATOCRIT 40.9 37.6 40.5   PLATELETS 180 166 155   NEUTROS ABS 2.94 4.83 3.50   IMMATURE GRANS (ABS) 0.02 0.02 0.01   LYMPHS ABS 0.90 0.59* 0.62*   MONOS ABS 0.54 0.62 0.45   EOS ABS 0.02 0.00 0.01   MCV 98.1* 96.4 98.1*   LACTATE  --   --  1.7         Lab 2214 22   SODIUM 137  --  139 138   POTASSIUM 4.4 4.0 3.2* 3.5   CHLORIDE 98  --  98 98   CO2 27.0  --  30.0* 29.0   ANION GAP 12.0  --  11.0 11.0   BUN 33*  --  20 18   CREATININE 0.79  --  0.75* 0.79   EGFR 86.5  --  87.9 86.5   GLUCOSE 116*  --  124* 167*   CALCIUM 8.8  --  8.8 8.7    HEMOGLOBIN A1C  --   --  5.10  --          Lab 12/12/22  1857   TOTAL PROTEIN 8.1   ALBUMIN 4.00   GLOBULIN 4.1   ALT (SGPT) 20   AST (SGOT) 33   BILIRUBIN 0.9   ALK PHOS 118*         Lab 12/12/22  1857   PROBNP 1,414.0   TROPONIN T <0.010                 Lab 12/12/22  1950   PH, ARTERIAL 7.367   PCO2, ARTERIAL 51.4*   PO2 .0   FIO2 44   HCO3 ART 29.5*   BASE EXCESS ART 3.2*   CARBOXYHEMOGLOBIN 0.9     Brief Urine Lab Results  (Last result in the past 365 days)      Color   Clarity   Blood   Leuk Est   Nitrite   Protein   CREAT   Urine HCG        08/04/22 1031 Yellow   Cloudy   Trace   Moderate (2+)   Negative   100 mg/dL (2+)                 Microbiology Results Abnormal     Procedure Component Value - Date/Time    Blood Culture - Blood, Hand, Right [[id number]]  (Normal) Collected: 12/13/22 0533    Lab Status: Preliminary result Specimen: Blood from Hand, Right Updated: 12/14/22 0600     Blood Culture No growth at 24 hours    Blood Culture - Blood, Hand, Left [[id number]]  (Normal) Collected: 12/13/22 0519    Lab Status: Preliminary result Specimen: Blood from Hand, Left Updated: 12/14/22 0600     Blood Culture No growth at 24 hours    S. Pneumo Ag Urine or CSF - Urine, Urine, Clean Catch [[id number]]  (Normal) Collected: 12/13/22 0055    Lab Status: Final result Specimen: Urine, Clean Catch Updated: 12/13/22 1038     Strep Pneumo Ag Negative    Legionella Antigen, Urine - Urine, Urine, Clean Catch [[id number]]  (Normal) Collected: 12/13/22 0055    Lab Status: Final result Specimen: Urine, Clean Catch Updated: 12/13/22 1038     LEGIONELLA ANTIGEN, URINE Negative          XR Chest 1 View    Result Date: 12/12/2022  DATE OF EXAM: 12/12/2022 7:20 PM  PROCEDURE: XR CHEST 1 VW-  INDICATIONS: SOA triage protocol  COMPARISON: No comparisons available.  TECHNIQUE: Single radiographic AP view of the chest was obtained.  FINDINGS: Mild cardiomegaly. Interstitial prominence may represents senescent changes. Bibasilar  opacities may represent atelectasis or infection. No pleural effusion or pneumothorax. No acute osseous abnormalities. Visualized upper abdomen is unremarkable..      Impression: Bibasilar opacities may represent atelectasis or infection.  This report was finalized on 12/12/2022 8:56 PM by Alexei Apodaca.      CT Angiogram Chest    Result Date: 12/13/2022  DATE OF EXAM: 12/12/2022 8:35 PM  PROCEDURE: CT ANGIOGRAM CHEST-  INDICATIONS: hypoxia; J96.01-Acute respiratory failure with hypoxia; J10.1-Influenza due to other identified influenza virus with other respiratory manifestations; Z86.39-Personal history of other endocrine, nutritional and metabolic disease; Z86.79-Personal history of other diseases of the circulatory system  COMPARISON: 6/21/2022  TECHNIQUE: Contiguous axial imaging was obtained from the thoracic inlet through the upper abdomen following the intravenous administration of 85 mL of Isovue 370. Reconstructed coronal and sagittal images were also obtained. Automated exposure control and iterative reconstruction methods were used.  The radiation dose reduction device was turned on for each scan per the ALARA (As Low as Reasonably Achievable) protocol.  FINDINGS: Pulmonary arteries: No evidence of pulmonary embolus. Main pulmonary artery is enlarged measuring 3.3 cm  Heart and pericardium:No flattening of the interventricular septum. Coronary artery calcification is seen. No substantial pericardial effusion.  Vessels:Normal caliber aorta. Atherosclerotic calcification of the aorta. No evidence of acute aortic injury. Venous structures including the superior vena cava and inferior vena cava appear grossly patent.  Mediastinum:Unremarkable appearance of the esophagus. Few enlarged right hilar lymph nodes measuring up to 18 mm in short axis (image 50), previously 13 mm. Multiple prominent mediastinal lymph nodes also seen. No anterior mediastinal masses.  Lower neck:No suspicious or enlarged  supraclavicular or axillary lymphadenopathy. Normal appearance of the thyroid.  Pulmonary parenchyma:22 x 19 mm subsolid nodule in the left lower lobe which is similar in size to prior examination but with increasing solid component (image 60). Minimal dependent atelectasis.  Pleura:No evidence of pleural effusion or pneumothorax.  Airways:Central and segmental airways are clear. Diffuse bronchial wall thickening.  Chest wall and bones:No acute osseous abnormality. Degenerative changes of thoracic spine. Unremarkable appearance of soft tissues.  Upper abdomen:No lesion seen within the visualized liver. Left renal pelvocaliectasis.      Impression: No evidence of pulmonary embolism.  Subsolid nodule in the left lower lobe with increasing solid component compared to prior examination concerning for a lesion on the adenocarcinoma spectrum. This can be further evaluated with PET/CT or tissue sampling.  Multiple enlarged right hilar nodes and prominent mediastinal nodes, nonspecific and increased from prior examination.  Left renal pelvocaliectasis which may reflect downstream obstruction. Recommend correlation with any symptoms of left flank pain or urinary symptoms.  Enlarged main pulmonary artery which can be seen in the setting of pulmonary hypertension.  This report was finalized on 12/13/2022 1:08 AM by Alexei Apodaca.        Results for orders placed during the hospital encounter of 08/05/22    Adult Transthoracic Echo Complete W/ Cont if Necessary Per Protocol    Interpretation Summary  · Technically difficult study  · LV systolic function is normal. Estimated LVEF 50%  · Left atrial volume is mildly increased.  · The cardiac valves are anatomically and functionally normal.      I have reviewed the medications:  Scheduled Meds:allopurinol, 300 mg, Oral, Daily  amiodarone, 100 mg, Oral, Daily  amLODIPine, 10 mg, Oral, Daily  apixaban, 5 mg, Oral, Q12H  ascorbic acid, 500 mg, Oral, Daily  docusate sodium, 300 mg,  Oral, Nightly  ferrous sulfate, 325 mg, Oral, Daily With Breakfast  finasteride, 5 mg, Oral, Daily  insulin lispro, 0-7 Units, Subcutaneous, TID AC  lactobacillus acidophilus, 1 capsule, Oral, Daily  metoprolol tartrate, 100 mg, Oral, Q12H  oseltamivir, 75 mg, Oral, Q12H  pantoprazole, 40 mg, Oral, Daily  pravastatin, 40 mg, Oral, Daily  sertraline, 50 mg, Oral, Daily  sodium chloride, 10 mL, Intravenous, Q12H  tamsulosin, 0.4 mg, Oral, Daily  valsartan, 80 mg, Oral, Daily      Continuous Infusions:nitroglycerin, 10-50 mcg/min, Last Rate: 10 mcg/min (12/14/22 1516)      PRN Meds:.•  acetaminophen **OR** acetaminophen **OR** acetaminophen  •  dextrose  •  dextrose  •  glucagon (human recombinant)  •  ipratropium-albuterol  •  potassium chloride  •  potassium chloride  •  sodium chloride  •  sodium chloride  •  sodium chloride    Assessment & Plan   Assessment & Plan     Active Hospital Problems    Diagnosis  POA   • Acute respiratory failure with hypoxia (HCC) [J96.01]  Unknown   • Influenza A [J10.1]  Unknown   • Hypertensive urgency [I16.0]  Unknown   • Type 2 diabetes mellitus (HCC) [E11.9]  Unknown   • 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        Brief Hospital Course to date:  Darien Camarena is a 87 y.o. male w a hx of HTN, STEVEN presenting with worsening dyspnea, found to be in acute respiratory failure with hypoxia secondary to Influenza A.    Acute hypoxemic respiratory failure  Influenza A  - Tamiflu started  - Has been able to be weaned off BiPAP and currently on nasal cannula.  Wean as tolerated.  - Received diuresis in the ED, strict I's and O's  - Clinically continue to  assess volume status for diuresis  -CTA of the chest showed no evidence of pulmonary embolism.  It did show a nodule in the left lower lobe which should be further evaluated with PET/CT or tissue sampling.    - He also has evidence of pulmonary hypertension on imaging  - Continue supportive care      LLL nodule  -d/w daughter who is a NP w the oncology clinic  -pulm consulted for biopsy. Dr Crawford would like resolution of his current infection before invasive biopsy procedures. He will f/u in clinic w PET scan to evaluate mediastinal lymph nodes      Hypertensive urgency  - Status post IV diuresis  - Currently on nitroglycerin drip which is being titrated, restarted home oral medications  -added hctz to regimen. Diuresis should help resp status as well    A. fib  - Continue amiodarone, Lopressor and Eliquis    Diabetes  - SSI    Hyperlipidemia  - Continue statin    BPH  - Flomax    GERD  - PPI    Expected Discharge Location and Transportation: Home expected Discharge Date: 12/16    DVT prophylaxis:  Medical and mechanical DVT prophylaxis orders are present.          CODE STATUS:   Code Status and Medical Interventions:   Ordered at: 12/12/22 3365     Level Of Support Discussed With:    Patient     Code Status (Patient has no pulse and is not breathing):    CPR (Attempt to Resuscitate)     Medical Interventions (Patient has pulse or is breathing):    Full Support       Jeanette Nixon MD  12/14/22

## 2022-12-14 NOTE — CONSULTS
Pulmonary Hospital Consultation       Reason for Consultation: Lung nodule  Referring Provider: Jeanette Nixon MD      History of Present Illness     87-year-old male with past medical history of hypertension, type 2 diabetes, coronary disease, atrial fibrillation on anticoagulation, dyslipidemia, remote history of smoking but extensive history of chewing tobacco presented with new onset complains of cough with shortness of breath.  Patient was seen in the urgent care clinic where he was found to be hypoxic with oxygen saturation of 82%.  Patient was placed on 2 L oxygen and was transferred to emergency room from where he was admitted to the hospital with tachycardia, mild hypercapnia and hypoxia.  Patient was supported with BiPAP and patient was also found to be positive for influenza A.  Underwent CT angiogram which did not show any pulmonary embolism but mediastinal adenopathy along with left lower lobe nodule noted.  Patient has been started on Tamiflu and also diuresed and fever curve has significantly improved.  Oxygen saturation has improved in last 48 hours and currently requiring 3 L nasal cannula.  Patient was seen sitting out in chair.  He states that he still has cough.  Not producing any sputum.  Denies any hemoptysis.  Denies any chest pain.  Denies any fevers or chills currently.  States that his appetite is improving.  No history of asthma or COPD in the past.  No frequent pneumonias or infections.  Denies any hematochezia, melena, urinary complaints.  No other ongoing exposures currently.    We were asked to comment on further work-up for left lower lobe nodule.    Problem List, Surgical History, Family, Social History, and ROS     Patient Active Problem List    Diagnosis    • Acute respiratory failure with hypoxia (HCC) [J96.01]    • Influenza A [J10.1]    • Hypertensive urgency [I16.0]    • Type 2 diabetes mellitus (HCC) [E11.9]    • Coronary artery disease involving native coronary artery of  native heart with angina pectoris (HCC) [I25.119]    • Hydronephrosis [N13.30]    • Long term current use of antiarrhythmic medical therapy [Z79.899]    • Stage 3 chronic kidney disease (HCC) [N18.30]    • Paroxysmal atrial fibrillation (HCC) [I48.0]    • Atopic rhinitis [J30.9]    • 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]    • Type 2 diabetes mellitus with stage 3 chronic kidney disease, without long-term current use of insulin (HCC) [E11.22, N18.30]    • Enlarged prostate without lower urinary tract symptoms (luts) [N40.0]    • Gastroesophageal reflux disease with esophagitis [K21.00]    • Polyp of gallbladder [K82.4]    • Gout [M10.9]    • Liver cyst [K76.89]    • Hyperlipidemia LDL goal <100 [E78.5]    • Essential hypertension [I10]    • Nephrolithiasis [N20.0]    • Obstructive sleep apnea syndrome [G47.33]      Past Surgical History:   Procedure Laterality Date   • CARDIAC CATHETERIZATION Left 9/29/2022    Procedure: Left Heart Cath;  Surgeon: Titus Oliveros IV, MD;  Location: [facility] CATH INVASIVE LOCATION;  Service: Cardiovascular;  Laterality: Left;   • CARDIOVERSION     • CATARACT EXTRACTION Left    • KIDNEY STONE SURGERY         Allergies   Allergen Reactions   • Xarelto [Rivaroxaban] GI Bleeding     GI bleed   • Penicillins Hives     Has tolerated cefepime, ceftriaxone     No current facility-administered medications on file prior to encounter.     Current Outpatient Medications on File Prior to Encounter   Medication Sig   • allopurinol (ZYLOPRIM) 300 MG tablet Take 1 tablet by mouth Daily.   • amiodarone (PACERONE) 200 MG tablet Take 0.5 tablets by mouth Daily.   • amLODIPine (NORVASC) 10 MG tablet TAKE 1 TABLET EVERY DAY   • apixaban (Eliquis) 5 MG tablet tablet Take 1 tablet by mouth Every 12 (Twelve) Hours.   • Ascorbic Acid (VITAMIN C) 500 MG capsule Take 1 tablet by mouth 4 (four) times a day.   • docusate sodium (COLACE) 100 MG capsule Take 300 mg by mouth every night.   • Ferrous  "Gluconate (IRON) 240 (27 FE) MG tablet Take 1 tablet by mouth daily.   • finasteride (PROSCAR) 5 MG tablet Take 1 tablet by mouth every night at bedtime.   • furosemide (LASIX) 20 MG tablet Take 1 tablet by mouth 2 (Two) Times a Day.   • metFORMIN (GLUCOPHAGE) 1000 MG tablet TAKE 1 TABLET TWICE DAILY   • metoprolol tartrate (LOPRESSOR) 100 MG tablet TAKE 1 TABLET EVERY 12 HOURS   • omeprazole (priLOSEC) 20 MG capsule TAKE 1 CAPSULE EVERY DAY   • pravastatin (PRAVACHOL) 40 MG tablet TAKE 1 TABLET EVERY DAY   • Probiotic Product (PROBIOTIC ADVANCED PO) Take 1 tablet by mouth 2 (Two) Times a Day.   • sertraline (Zoloft) 50 MG tablet Take 1 tablet by mouth Daily.   • tamsulosin (FLOMAX) 0.4 MG capsule 24 hr capsule TAKE 1 CAPSULE EVERY DAY   • valsartan (DIOVAN) 80 MG tablet Take 80 mg by mouth Daily.     MEDICATION LIST AND ALLERGIES REVIEWED.    Family History   Problem Relation Age of Onset   • Alzheimer's disease Mother    • Cancer Father    • COPD Father    • No Known Problems Daughter    • No Known Problems Daughter    • No Known Problems Daughter      Social History     Tobacco Use   • Smoking status: Former     Types: Cigarettes     Quit date: 1960     Years since quittin.6   • Smokeless tobacco: Never   • Tobacco comments:     started at 13 yo.   Vaping Use   • Vaping Use: Never used   Substance Use Topics   • Alcohol use: No   • Drug use: Never     Social History     Social History Narrative    Caffeine: 1 cup of decaff coffee daily      FAMILY AND SOCIAL HISTORY REVIEWED.    Review of Systems  ALL OTHER SYSTEMS REVIEWED AND ARE NEGATIVE.    Physical Exam and Clinical Information   /70   Pulse 51   Temp 98.7 °F (37.1 °C) (Oral)   Resp 18   Ht 190.5 cm (75\")   Wt 102 kg (224 lb 4.8 oz)   SpO2 91%   BMI 28.04 kg/m²   Physical Exam    General Appearance: Awake, alert, in no acute distress  Head:    Atraumatic, Normocephalic, without obvious abnormality, Pupils reactive & symmetrical " B/L.  Neck:   Supple,  trachea midline   Lungs:   B/L Breath sounds present with decreased breath sounds on bases, no wheezing heard, no crackles.   Heart: S1 and S2 present, no murmur  Abdomen: Soft, non-tender, no guarding or rigidity, bowel sounds positive.  Extremities: no cyanosis or clubbing,  no edema, warm to touch.  Neurologic:  Moving all four extremities. Good strength bilaterally.  Psychologic: Appropriate affect, Cooperative.     Results from last 7 days   Lab Units 12/14/22 0914 12/13/22 0519 12/12/22 1857   WBC 10*3/mm3 4.44 6.07 4.60   HEMOGLOBIN g/dL 12.8* 12.1* 12.9*   PLATELETS 10*3/mm3 180 166 155     Results from last 7 days   Lab Units 12/14/22 0914 12/13/22 2057 12/13/22 0519 12/12/22 1857   SODIUM mmol/L 137  --  139 138   POTASSIUM mmol/L 4.4 4.0 3.2* 3.5   CO2 mmol/L 27.0  --  30.0* 29.0   BUN mg/dL 33*  --  20 18   CREATININE mg/dL 0.79  --  0.75* 0.79   GLUCOSE mg/dL 116*  --  124* 167*     Estimated Creatinine Clearance: 86.9 mL/min (by C-G formula based on SCr of 0.79 mg/dL).  Results from last 7 days   Lab Units 12/13/22 0519   HEMOGLOBIN A1C % 5.10     Results from last 7 days   Lab Units 12/12/22  1950   PH, ARTERIAL pH units 7.367   PCO2, ARTERIAL mm Hg 51.4*   PO2 ART mm Hg 107.0     Lab Results   Component Value Date    LACTATE 1.7 12/12/2022        Images: Chest CT reviewed, 22 x 19 mm of solid nodule noted in the left lower lobe with increasing soft tissue component.  Mild lymphadenopathy especially right hilar lymph node enlargement noted.  No significant effusions.  Minimal basilar atelectasis.    I reviewed the patient's results and images.     Impression     Hyperlipidemia LDL goal <100    Essential hypertension    Obstructive sleep apnea syndrome    Paroxysmal atrial fibrillation (HCC)    Coronary artery disease involving native coronary artery of native heart with angina pectoris (HCC)    Acute respiratory failure with hypoxia (HCC)    Influenza A    Hypertensive  urgency    Type 2 diabetes mellitus (HCC)    Plan/Recommendations     1.  Patient presented here with acute hypoxemic and hypercapnic respiratory failure thought to be due to influenza A.  Went Tamiflu and conservative treatment patient seems to be improving.  Down to 3 L nasal cannula oxygen and currently saturating 96%.  Subjectively feeling better as well with improvement in fever curve.  Continue current supportive care.  2.  Patient is noted to have left lower lobe nodule which was present in June as well but at that time it was more groundglass nodule and now there is increasing solid noted.  No other suspicious nodules noted.  Patient previously also had right hilar lymph node but did seem to be slightly enlarged further.  Given all this risk of malignancy is high specially adenocarcinoma.  Discussed with patient that we will need to consider biopsy if patient is wanting aggressive cares.  That will require either navigational bronchoscopy with biopsy or CT-guided biopsy.  He is on blood thinners and will need to get off Eliquis for at least 3 days prior to those procedures.  Discussed that I would like for him to recover from this acute illness before we proceed with any more invasive procedures.  He is comfortable with this plan.  I was able to talk to patient's daughter Nikki over the phone.  I try to talk to patient's daughter who is nurse practitioner in oncology at  but unable to connect with her.  I think a better course of action would be to have him recover from influenza and then have him come back in clinic with PET scan so that we can evaluate mediastinal lymph nodes and any other areas which could be amenable to biopsy.  Following that any of the above biopsy procedures can be carried out.  I will discuss with intervention radiology about feasibility of doing CT-guided biopsy and if not possible and PET scan is positive for mediastinal lymph nodes then patient will be placed for navigational  bronchoscopy and endobronchial ultrasound-guided biopsy of lymph nodes for diagnostic and staging.  Does not appear infectious process.    Will have further discussions with family regarding optimal approach.  Thank you for allowing me to participate in the care of this patient.      Time spent 45 min time (exclusive of procedure time)  including high complexity decision making to assess, manipulate, and support vital organ system and discussions with other staff taking care of this patient.      Neo Crawford MD, [facility]  Pulmonary and Critical Care Medicine  12/14/22 14:18 EST     CC: Pito Way MD

## 2022-12-14 NOTE — PLAN OF CARE
Goal Outcome Evaluation:  Plan of Care Reviewed With: patient        Progress: improving  Outcome Evaluation: Patient has rested well tonight. Vss, Sr, 3lnc. Patient has been coughing up a good deal of sputum tonight. Patient has remained afebrile and Tamiflu continue.

## 2022-12-15 DIAGNOSIS — R91.1 LUNG NODULE: Primary | ICD-10-CM

## 2022-12-15 LAB
GLUCOSE BLDC GLUCOMTR-MCNC: 108 MG/DL (ref 70–130)
GLUCOSE BLDC GLUCOMTR-MCNC: 133 MG/DL (ref 70–130)
GLUCOSE BLDC GLUCOMTR-MCNC: 200 MG/DL (ref 70–130)

## 2022-12-15 PROCEDURE — 99232 SBSQ HOSP IP/OBS MODERATE 35: CPT | Performed by: INTERNAL MEDICINE

## 2022-12-15 PROCEDURE — 82962 GLUCOSE BLOOD TEST: CPT

## 2022-12-15 PROCEDURE — 63710000001 INSULIN LISPRO (HUMAN) PER 5 UNITS: Performed by: NURSE PRACTITIONER

## 2022-12-15 PROCEDURE — 99232 SBSQ HOSP IP/OBS MODERATE 35: CPT | Performed by: STUDENT IN AN ORGANIZED HEALTH CARE EDUCATION/TRAINING PROGRAM

## 2022-12-15 RX ORDER — CHOLECALCIFEROL (VITAMIN D3) 125 MCG
5 CAPSULE ORAL NIGHTLY
Status: DISCONTINUED | OUTPATIENT
Start: 2022-12-15 | End: 2022-12-16 | Stop reason: HOSPADM

## 2022-12-15 RX ADMIN — Medication 10 ML: at 20:48

## 2022-12-15 RX ADMIN — APIXABAN 5 MG: 5 TABLET, FILM COATED ORAL at 08:02

## 2022-12-15 RX ADMIN — Medication 5 MG: at 20:48

## 2022-12-15 RX ADMIN — TAMSULOSIN HYDROCHLORIDE 0.4 MG: 0.4 CAPSULE ORAL at 08:01

## 2022-12-15 RX ADMIN — OXYCODONE HYDROCHLORIDE AND ACETAMINOPHEN 500 MG: 500 TABLET ORAL at 08:01

## 2022-12-15 RX ADMIN — METOPROLOL TARTRATE 100 MG: 100 TABLET, FILM COATED ORAL at 20:48

## 2022-12-15 RX ADMIN — AMLODIPINE BESYLATE 10 MG: 10 TABLET ORAL at 08:02

## 2022-12-15 RX ADMIN — INSULIN LISPRO 3 UNITS: 100 INJECTION, SOLUTION INTRAVENOUS; SUBCUTANEOUS at 12:07

## 2022-12-15 RX ADMIN — HYDROCHLOROTHIAZIDE 25 MG: 25 TABLET ORAL at 08:01

## 2022-12-15 RX ADMIN — VALSARTAN 80 MG: 160 TABLET, FILM COATED ORAL at 08:02

## 2022-12-15 RX ADMIN — METOPROLOL TARTRATE 100 MG: 100 TABLET, FILM COATED ORAL at 08:00

## 2022-12-15 RX ADMIN — Medication 1 CAPSULE: at 08:01

## 2022-12-15 RX ADMIN — AMIODARONE HYDROCHLORIDE 100 MG: 200 TABLET ORAL at 08:01

## 2022-12-15 RX ADMIN — PANTOPRAZOLE SODIUM 40 MG: 40 TABLET, DELAYED RELEASE ORAL at 08:01

## 2022-12-15 RX ADMIN — FERROUS SULFATE TAB 325 MG (65 MG ELEMENTAL FE) 325 MG: 325 (65 FE) TAB at 08:02

## 2022-12-15 RX ADMIN — Medication 10 ML: at 09:19

## 2022-12-15 RX ADMIN — OSELTAMIVIR PHOSPHATE 75 MG: 75 CAPSULE ORAL at 20:48

## 2022-12-15 RX ADMIN — ALLOPURINOL 300 MG: 300 TABLET ORAL at 08:00

## 2022-12-15 RX ADMIN — APIXABAN 5 MG: 5 TABLET, FILM COATED ORAL at 20:48

## 2022-12-15 RX ADMIN — DOCUSATE SODIUM 300 MG: 100 CAPSULE, LIQUID FILLED ORAL at 20:48

## 2022-12-15 RX ADMIN — PRAVASTATIN SODIUM 40 MG: 40 TABLET ORAL at 08:01

## 2022-12-15 RX ADMIN — SERTRALINE HYDROCHLORIDE 50 MG: 50 TABLET ORAL at 08:02

## 2022-12-15 RX ADMIN — FINASTERIDE 5 MG: 5 TABLET, FILM COATED ORAL at 08:03

## 2022-12-15 RX ADMIN — OSELTAMIVIR PHOSPHATE 75 MG: 75 CAPSULE ORAL at 08:00

## 2022-12-15 NOTE — PROGRESS NOTES
[facility] Medicine Services  PROGRESS NOTE    Patient Name: Darien Camarena  : 1935  MRN: [id number]    Date of Admission: 2022  Primary Care Physician: Pito Way MD    Subjective   Subjective     CC:  Dyspnea, flu    HPI:  When asked about his respiratory status he states he feels like he is at or close to baseline. He is on 2L NC, but doesn't wear O2 at home.    ROS:  Gen- No fevers, chills  CV- No chest pain, palpitations  Resp- no dyspnea today  GI- No N/V/D, abd pain         Objective   Objective     Vital Signs:   Temp:  [97.5 °F (36.4 °C)-98.1 °F (36.7 °C)] 97.5 °F (36.4 °C)  Heart Rate:  [47-62] 48  Resp:  [17-22] 20  BP: (120-166)/(68-98) 160/85  Flow (L/min):  [2-3] 2     Physical Exam:  Constitutional: in no acute distress, awake, alert, pleasant  HENT: NCAT, mucous membranes moist  Respiratory: slight rhonchi scattered throughout, on 2L NC and comfortable appearing  Cardiovascular: RRR, no murmurs, rubs, or gallops  Gastrointestinal: Positive bowel sounds, soft, nontender, nondistended  Musculoskeletal: No bilateral ankle edema  Psychiatric: Appropriate affect, cooperative  Neurologic: Oriented x 3, strength symmetric in all extremities, Cranial Nerves grossly intact to confrontation, speech clear  Skin: No rashes      Results Reviewed:  LAB RESULTS:      Lab 22  1857   WBC 4.44 6.07 4.60   HEMOGLOBIN 12.8* 12.1* 12.9*   HEMATOCRIT 40.9 37.6 40.5   PLATELETS 180 166 155   NEUTROS ABS 2.94 4.83 3.50   IMMATURE GRANS (ABS) 0.02 0.02 0.01   LYMPHS ABS 0.90 0.59* 0.62*   MONOS ABS 0.54 0.62 0.45   EOS ABS 0.02 0.00 0.01   MCV 98.1* 96.4 98.1*   LACTATE  --   --  1.7         Lab 22  0914 22  1857   SODIUM 137  --  139 138   POTASSIUM 4.4 4.0 3.2* 3.5   CHLORIDE 98  --  98 98   CO2 27.0  --  30.0* 29.0   ANION GAP 12.0  --  11.0 11.0   BUN 33*  --  20 18   CREATININE 0.79  --   0.75* 0.79   EGFR 86.5  --  87.9 86.5   GLUCOSE 116*  --  124* 167*   CALCIUM 8.8  --  8.8 8.7   HEMOGLOBIN A1C  --   --  5.10  --          Lab 12/12/22  1857   TOTAL PROTEIN 8.1   ALBUMIN 4.00   GLOBULIN 4.1   ALT (SGPT) 20   AST (SGOT) 33   BILIRUBIN 0.9   ALK PHOS 118*         Lab 12/12/22  1857   PROBNP 1,414.0   TROPONIN T <0.010                 Lab 12/12/22  1950   PH, ARTERIAL 7.367   PCO2, ARTERIAL 51.4*   PO2 .0   FIO2 44   HCO3 ART 29.5*   BASE EXCESS ART 3.2*   CARBOXYHEMOGLOBIN 0.9     Brief Urine Lab Results  (Last result in the past 365 days)      Color   Clarity   Blood   Leuk Est   Nitrite   Protein   CREAT   Urine HCG        08/04/22 1031 Yellow   Cloudy   Trace   Moderate (2+)   Negative   100 mg/dL (2+)                 Microbiology Results Abnormal     Procedure Component Value - Date/Time    Blood Culture - Blood, Hand, Right [[id number]]  (Normal) Collected: 12/13/22 0533    Lab Status: Preliminary result Specimen: Blood from Hand, Right Updated: 12/15/22 0600     Blood Culture No growth at 2 days    Blood Culture - Blood, Hand, Left [[id number]]  (Normal) Collected: 12/13/22 0519    Lab Status: Preliminary result Specimen: Blood from Hand, Left Updated: 12/15/22 0600     Blood Culture No growth at 2 days    S. Pneumo Ag Urine or CSF - Urine, Urine, Clean Catch [[id number]]  (Normal) Collected: 12/13/22 0055    Lab Status: Final result Specimen: Urine, Clean Catch Updated: 12/13/22 1038     Strep Pneumo Ag Negative    Legionella Antigen, Urine - Urine, Urine, Clean Catch [[id number]]  (Normal) Collected: 12/13/22 0055    Lab Status: Final result Specimen: Urine, Clean Catch Updated: 12/13/22 1038     LEGIONELLA ANTIGEN, URINE Negative          No radiology results from the last 24 hrs    Results for orders placed during the hospital encounter of 08/05/22    Adult Transthoracic Echo Complete W/ Cont if Necessary Per Protocol    Interpretation Summary  · Technically difficult study  · LV  systolic function is normal. Estimated LVEF 50%  · Left atrial volume is mildly increased.  · The cardiac valves are anatomically and functionally normal.      I have reviewed the medications:  Scheduled Meds:allopurinol, 300 mg, Oral, Daily  amiodarone, 100 mg, Oral, Daily  amLODIPine, 10 mg, Oral, Daily  apixaban, 5 mg, Oral, Q12H  ascorbic acid, 500 mg, Oral, Daily  docusate sodium, 300 mg, Oral, Nightly  ferrous sulfate, 325 mg, Oral, Daily With Breakfast  finasteride, 5 mg, Oral, Daily  hydroCHLOROthiazide Oral, 25 mg, Oral, Daily  insulin lispro, 0-7 Units, Subcutaneous, TID AC  lactobacillus acidophilus, 1 capsule, Oral, Daily  melatonin, 5 mg, Oral, Nightly  metoprolol tartrate, 100 mg, Oral, Q12H  oseltamivir, 75 mg, Oral, Q12H  pantoprazole, 40 mg, Oral, Daily  pravastatin, 40 mg, Oral, Daily  sertraline, 50 mg, Oral, Daily  sodium chloride, 10 mL, Intravenous, Q12H  tamsulosin, 0.4 mg, Oral, Daily  valsartan, 80 mg, Oral, Daily      Continuous Infusions:   PRN Meds:.•  acetaminophen **OR** acetaminophen **OR** acetaminophen  •  dextrose  •  dextrose  •  glucagon (human recombinant)  •  ipratropium-albuterol  •  potassium chloride  •  potassium chloride  •  sodium chloride  •  sodium chloride  •  sodium chloride    Assessment & Plan   Assessment & Plan     Active Hospital Problems    Diagnosis  POA   • Acute respiratory failure with hypoxia (HCC) [J96.01]  Unknown   • Influenza A [J10.1]  Unknown   • Hypertensive urgency [I16.0]  Unknown   • Type 2 diabetes mellitus (HCC) [E11.9]  Unknown   • 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        Brief Hospital Course to date:  Darien  Isidro Camarena is a 87 y.o. male w a hx of HTN, STEVEN presenting with worsening dyspnea, found to be in acute respiratory failure with hypoxia secondary to Influenza A.    Acute hypoxemic respiratory failure  Influenza A  - Tamiflu started  - Has been able to be weaned off BiPAP and currently on nasal cannula.  Wean as tolerated. On 2L currently, may need O2 for home  - Received diuresis in the ED, strict I's and O's  - Clinically continue to assess volume status for diuresis  -CTA of the chest showed no evidence of pulmonary embolism.  It did show a nodule in the left lower lobe which should be further evaluated with PET/CT or tissue sampling.    - He also has evidence of pulmonary hypertension on imaging  - Continue supportive care      LLL nodule  -d/w daughter who is a NP w the oncology clinic  -pulm consulted for biopsy. Dr Crawford would like resolution of his current infection before invasive biopsy procedures. He will f/u in clinic w PET scan to evaluate mediastinal lymph nodes      Hypertensive urgency  - Status post IV diuresis  - off nitroglycerin  -added hctz to regimen. Diuresis should help resp status as well  -- of note he takes lasix at home per daughter for LE swelling. He does not take amlodipine at home but his BP has been well controlled with it here that it may be reasonable to continue at home and watch for swelling. Would recommend continuing hctz or diuretic antihypertensive at home. D/w daughter and he does have good f/u w PCP to further adjust BP meds    A. fib  - Continue amiodarone, Lopressor and Eliquis    Diabetes  - SSI    Hyperlipidemia  - Continue statin    BPH  - Flomax    GERD  - PPI    Expected Discharge Location and Transportation: Home expected Discharge Date: 12/16        DVT prophylaxis:  Medical and mechanical DVT prophylaxis orders are present.          CODE STATUS:   Code Status and Medical Interventions:   Ordered at: 12/12/22 3701     Level Of Support Discussed With:    Patient      Code Status (Patient has no pulse and is not breathing):    CPR (Attempt to Resuscitate)     Medical Interventions (Patient has pulse or is breathing):    Full Support       Jeanette Nixon MD  12/15/22

## 2022-12-15 NOTE — PROGRESS NOTES
Pulmonary Hospital Follow-up     Hospital:  LOS: 3 days   Mr. Darien Camarena, 87 y.o. male is followed for:   Lung nodule            History of present illness:   87-year-old male with past medical history of hypertension, type 2 diabetes, coronary disease, atrial fibrillation on anticoagulation, dyslipidemia, remote history of smoking but extensive history of chewing tobacco presented with new onset complains of cough with shortness of breath.  Patient was seen in the urgent care clinic where he was found to be hypoxic with oxygen saturation of 82%.  Patient was placed on 2 L oxygen and was transferred to emergency room from where he was admitted to the hospital with tachycardia, mild hypercapnia and hypoxia.  Patient was supported with BiPAP and patient was also found to be positive for influenza A.  Underwent CT angiogram which did not show any pulmonary embolism but mediastinal adenopathy along with left lower lobe nodule noted.  Patient has been started on Tamiflu and also diuresed and fever curve has significantly improved.  Oxygen saturation has improved in last 48 hours and currently requiring 3 L nasal cannula.  Patient was seen sitting out in chair.  He states that he still has cough.  Not producing any sputum.  Denies any hemoptysis.  Denies any chest pain.  Denies any fevers or chills currently.  States that his appetite is improving.  No history of asthma or COPD in the past.  No frequent pneumonias or infections.  Denies any hematochezia, melena, urinary complaints.  No other ongoing exposures currently.      Subjective   Interval History:   Overnight no acute events. Pt states that he is feeling better. Off NTG drip and BP acceptable.                The patient's past medical, surgical and social history were reviewed and updated in Epic as appropriate.       Objective     Infusions:     Medications:  allopurinol, 300 mg, Oral, Daily  amiodarone, 100 mg, Oral, Daily  amLODIPine, 10 mg, Oral,  Daily  apixaban, 5 mg, Oral, Q12H  ascorbic acid, 500 mg, Oral, Daily  docusate sodium, 300 mg, Oral, Nightly  ferrous sulfate, 325 mg, Oral, Daily With Breakfast  finasteride, 5 mg, Oral, Daily  hydroCHLOROthiazide Oral, 25 mg, Oral, Daily  insulin lispro, 0-7 Units, Subcutaneous, TID AC  lactobacillus acidophilus, 1 capsule, Oral, Daily  melatonin, 5 mg, Oral, Nightly  metoprolol tartrate, 100 mg, Oral, Q12H  oseltamivir, 75 mg, Oral, Q12H  pantoprazole, 40 mg, Oral, Daily  pravastatin, 40 mg, Oral, Daily  sertraline, 50 mg, Oral, Daily  sodium chloride, 10 mL, Intravenous, Q12H  tamsulosin, 0.4 mg, Oral, Daily  valsartan, 80 mg, Oral, Daily        Vital Sign Min/Max for last 24 hours  Temp  Min: 97.5 °F (36.4 °C)  Max: 98.1 °F (36.7 °C)   BP  Min: 120/68  Max: 166/79   Pulse  Min: 47  Max: 62   Resp  Min: 17  Max: 22   SpO2  Min: 82 %  Max: 94 %   Flow (L/min)  Min: 2  Max: 3       Input/Output for last 24 hour shift  12/14 0701 - 12/15 0700  In: 840 [P.O.:840]  Out: 900 [Urine:900]      Objective  General Appearance: Awake, alert, in no acute distress  Lungs:   B/L Breath sounds present with decreased breath sounds on bases, no wheezing heard, no crackles.   Heart: S1 and S2 present, no murmur  Extremities: no cyanosis or clubbing,  no edema, warm to touch.  Neurologic:  Moving all four extremities. Good strength bilaterally.  Psychologic: Appropriate affect, Cooperative.          Results from last 7 days   Lab Units 12/14/22  0914 12/13/22 0519 12/12/22  1857   WBC 10*3/mm3 4.44 6.07 4.60   HEMOGLOBIN g/dL 12.8* 12.1* 12.9*   PLATELETS 10*3/mm3 180 166 155     Results from last 7 days   Lab Units 12/14/22  0914 12/13/22 2057 12/13/22 0519 12/12/22  1857   SODIUM mmol/L 137  --  139 138   POTASSIUM mmol/L 4.4 4.0 3.2* 3.5   CO2 mmol/L 27.0  --  30.0* 29.0   BUN mg/dL 33*  --  20 18   CREATININE mg/dL 0.79  --  0.75* 0.79   GLUCOSE mg/dL 116*  --  124* 167*     Estimated Creatinine Clearance: 86.9 mL/min (by  C-G formula based on SCr of 0.79 mg/dL).    Results from last 7 days   Lab Units 12/12/22 1950   PH, ARTERIAL pH units 7.367   PCO2, ARTERIAL mm Hg 51.4*   PO2 ART mm Hg 107.0       Images:   No new    I reviewed the patient's results and images.     Assessment & Plan   Impression        Hyperlipidemia LDL goal <100    Essential hypertension    Obstructive sleep apnea syndrome    Paroxysmal atrial fibrillation (HCC)    Coronary artery disease involving native coronary artery of native heart with angina pectoris (HCC)    Acute respiratory failure with hypoxia (HCC)    Influenza A    Hypertensive urgency    Type 2 diabetes mellitus (HCC)       Plan        Discussed with patient and also discussed the patient's daughter Columba about further plan.  Discussed with her that he has left lower lobe nodule size of which has not changed much but solid tissue component has increased which makes me concerned that we may be dealing with some sort of malignant process.  Interestingly patient has right hilar lymph node which is also increased in size since June.  Discussed that it is hard presentation and I would like to get PET scan to further evaluate.  That may also give us another accessible site for biopsy.  If both areas are lighting up then patient will benefit from he percent navigational bronchoscopy to diagnose and stage at the same time.  If lymphadenopathy is negative and only nodule is positive we may be willing to get by with CT-guided biopsy so that we do not have to put him under general anesthesia.  Patient verbalized understanding and is agreeable with current plan of care.  Patient had no further questions at this point.  He will need to be off anticoagulation prior to procedure.     Continue rest of the treatment for influenza and resp failure per hospitalist team.       Neo Crawford MD, FCCP  Pulmonary, Critical care and Sleep Medicine

## 2022-12-15 NOTE — CASE MANAGEMENT/SOCIAL WORK
Continued Stay Note  [facility]     Patient Name: Darien Camarena  MRN: [id number]  Today's Date: 12/15/2022    Admit Date: 12/12/2022    Plan: HOME   Discharge Plan     Row Name 12/15/22 1404       Plan    Plan Comments Pulmonary following for lung nodule and right hilar lymph node enlargement.  Anticipate PET scan to further assess.  Currently requiring O2 at 2lpm/NC.  CM will cont to follow for any DC needs.               Discharge Codes    No documentation.               Expected Discharge Date and Time     Expected Discharge Date Expected Discharge Time    Dec 16, 2022             Tammy Dyson RN

## 2022-12-15 NOTE — PLAN OF CARE
Goal Outcome Evaluation:               Aox4. 3L NC. SB on monitor. SBP in 140-150s. Nitro drip infusing. No complaints of pain. Pt unable to sleep during night. Requesting sleep medication for tonight. Precautions maintained.

## 2022-12-16 ENCOUNTER — READMISSION MANAGEMENT (OUTPATIENT)
Dept: CALL CENTER | Facility: HOSPITAL | Age: 87
End: 2022-12-16

## 2022-12-16 VITALS
OXYGEN SATURATION: 89 % | HEART RATE: 52 BPM | DIASTOLIC BLOOD PRESSURE: 78 MMHG | BODY MASS INDEX: 27.89 KG/M2 | SYSTOLIC BLOOD PRESSURE: 152 MMHG | HEIGHT: 75 IN | RESPIRATION RATE: 17 BRPM | TEMPERATURE: 97.4 F | WEIGHT: 224.3 LBS

## 2022-12-16 LAB
ANION GAP SERPL CALCULATED.3IONS-SCNC: 9 MMOL/L (ref 5–15)
BASOPHILS # BLD AUTO: 0.01 10*3/MM3 (ref 0–0.2)
BASOPHILS NFR BLD AUTO: 0.2 % (ref 0–1.5)
BUN SERPL-MCNC: 31 MG/DL (ref 8–23)
BUN/CREAT SERPL: 44.3 (ref 7–25)
CALCIUM SPEC-SCNC: 9.1 MG/DL (ref 8.6–10.5)
CHLORIDE SERPL-SCNC: 96 MMOL/L (ref 98–107)
CO2 SERPL-SCNC: 33 MMOL/L (ref 22–29)
CREAT SERPL-MCNC: 0.7 MG/DL (ref 0.76–1.27)
DEPRECATED RDW RBC AUTO: 46 FL (ref 37–54)
EGFRCR SERPLBLD CKD-EPI 2021: 89.7 ML/MIN/1.73
EOSINOPHIL # BLD AUTO: 0.04 10*3/MM3 (ref 0–0.4)
EOSINOPHIL NFR BLD AUTO: 0.9 % (ref 0.3–6.2)
ERYTHROCYTE [DISTWIDTH] IN BLOOD BY AUTOMATED COUNT: 13.2 % (ref 12.3–15.4)
GLUCOSE BLDC GLUCOMTR-MCNC: 111 MG/DL (ref 70–130)
GLUCOSE BLDC GLUCOMTR-MCNC: 135 MG/DL (ref 70–130)
GLUCOSE SERPL-MCNC: 117 MG/DL (ref 65–99)
HCT VFR BLD AUTO: 37.2 % (ref 37.5–51)
HGB BLD-MCNC: 12.3 G/DL (ref 13–17.7)
IMM GRANULOCYTES # BLD AUTO: 0.01 10*3/MM3 (ref 0–0.05)
IMM GRANULOCYTES NFR BLD AUTO: 0.2 % (ref 0–0.5)
LYMPHOCYTES # BLD AUTO: 1.22 10*3/MM3 (ref 0.7–3.1)
LYMPHOCYTES NFR BLD AUTO: 27.2 % (ref 19.6–45.3)
MCH RBC QN AUTO: 31.4 PG (ref 26.6–33)
MCHC RBC AUTO-ENTMCNC: 33.1 G/DL (ref 31.5–35.7)
MCV RBC AUTO: 94.9 FL (ref 79–97)
MONOCYTES # BLD AUTO: 0.46 10*3/MM3 (ref 0.1–0.9)
MONOCYTES NFR BLD AUTO: 10.2 % (ref 5–12)
NEUTROPHILS NFR BLD AUTO: 2.75 10*3/MM3 (ref 1.7–7)
NEUTROPHILS NFR BLD AUTO: 61.3 % (ref 42.7–76)
NRBC BLD AUTO-RTO: 0 /100 WBC (ref 0–0.2)
PLATELET # BLD AUTO: 193 10*3/MM3 (ref 140–450)
PMV BLD AUTO: 9.9 FL (ref 6–12)
POTASSIUM SERPL-SCNC: 3.9 MMOL/L (ref 3.5–5.2)
RBC # BLD AUTO: 3.92 10*6/MM3 (ref 4.14–5.8)
SODIUM SERPL-SCNC: 138 MMOL/L (ref 136–145)
WBC NRBC COR # BLD: 4.49 10*3/MM3 (ref 3.4–10.8)

## 2022-12-16 PROCEDURE — 82962 GLUCOSE BLOOD TEST: CPT

## 2022-12-16 PROCEDURE — 80048 BASIC METABOLIC PNL TOTAL CA: CPT | Performed by: STUDENT IN AN ORGANIZED HEALTH CARE EDUCATION/TRAINING PROGRAM

## 2022-12-16 PROCEDURE — 85025 COMPLETE CBC W/AUTO DIFF WBC: CPT | Performed by: STUDENT IN AN ORGANIZED HEALTH CARE EDUCATION/TRAINING PROGRAM

## 2022-12-16 PROCEDURE — 99239 HOSP IP/OBS DSCHRG MGMT >30: CPT | Performed by: INTERNAL MEDICINE

## 2022-12-16 RX ORDER — HYDROCHLOROTHIAZIDE 25 MG/1
12.5 TABLET ORAL DAILY
Qty: 30 TABLET | Refills: 2 | Status: SHIPPED | OUTPATIENT
Start: 2022-12-17 | End: 2023-01-23 | Stop reason: SDUPTHER

## 2022-12-16 RX ORDER — HYDROCHLOROTHIAZIDE 25 MG/1
25 TABLET ORAL DAILY
Qty: 30 TABLET | Refills: 5 | Status: CANCELLED | OUTPATIENT
Start: 2022-12-16

## 2022-12-16 RX ORDER — OSELTAMIVIR PHOSPHATE 75 MG/1
75 CAPSULE ORAL EVERY 12 HOURS SCHEDULED
Qty: 3 CAPSULE | Refills: 0 | Status: SHIPPED | OUTPATIENT
Start: 2022-12-16 | End: 2022-12-18

## 2022-12-16 RX ADMIN — AMIODARONE HYDROCHLORIDE 100 MG: 200 TABLET ORAL at 08:46

## 2022-12-16 RX ADMIN — APIXABAN 5 MG: 5 TABLET, FILM COATED ORAL at 08:46

## 2022-12-16 RX ADMIN — OSELTAMIVIR PHOSPHATE 75 MG: 75 CAPSULE ORAL at 09:47

## 2022-12-16 RX ADMIN — FINASTERIDE 5 MG: 5 TABLET, FILM COATED ORAL at 08:46

## 2022-12-16 RX ADMIN — PRAVASTATIN SODIUM 40 MG: 40 TABLET ORAL at 08:46

## 2022-12-16 RX ADMIN — FERROUS SULFATE TAB 325 MG (65 MG ELEMENTAL FE) 325 MG: 325 (65 FE) TAB at 08:46

## 2022-12-16 RX ADMIN — Medication 10 ML: at 08:48

## 2022-12-16 RX ADMIN — ALLOPURINOL 300 MG: 300 TABLET ORAL at 08:45

## 2022-12-16 RX ADMIN — TAMSULOSIN HYDROCHLORIDE 0.4 MG: 0.4 CAPSULE ORAL at 08:44

## 2022-12-16 RX ADMIN — OXYCODONE HYDROCHLORIDE AND ACETAMINOPHEN 500 MG: 500 TABLET ORAL at 08:45

## 2022-12-16 RX ADMIN — SERTRALINE HYDROCHLORIDE 50 MG: 50 TABLET ORAL at 08:45

## 2022-12-16 RX ADMIN — Medication 1 CAPSULE: at 08:45

## 2022-12-16 RX ADMIN — HYDROCHLOROTHIAZIDE 25 MG: 25 TABLET ORAL at 08:45

## 2022-12-16 RX ADMIN — VALSARTAN 80 MG: 160 TABLET, FILM COATED ORAL at 08:44

## 2022-12-16 RX ADMIN — PANTOPRAZOLE SODIUM 40 MG: 40 TABLET, DELAYED RELEASE ORAL at 08:45

## 2022-12-16 RX ADMIN — AMLODIPINE BESYLATE 10 MG: 10 TABLET ORAL at 08:45

## 2022-12-16 NOTE — DISCHARGE SUMMARY
[facility] Medicine Services  DISCHARGE SUMMARY    Patient Name: Darien Camarena  : 1935  MRN: [id number]    Date of Admission: 2022  6:37 PM  Date of Discharge:  2022  Primary Care Physician: Pito Way MD    Consults     Date and Time Order Name Status Description    2022  1:07 PM Inpatient Pulmonology Consult Completed           Hospital Course     Presenting Problem:   Acute respiratory failure with hypoxia (HCC) [J96.01]    Active Hospital Problems    Diagnosis  POA   • Acute respiratory failure with hypoxia (HCC) [J96.01]  Unknown   • Influenza A [J10.1]  Unknown   • Hypertensive urgency [I16.0]  Unknown   • Type 2 diabetes mellitus (HCC) [E11.9]  Unknown   • 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          Hospital Course:  Darien Camarena is a 87 y.o. male  w a hx of HTN, STEVEN. He lives at home alone and works full time.  He developed URI symptoms several days prior to presentation, and came to ED where Flu A was diagnosed.       Acute hypoxemic respiratory failure  Influenza A  -CTA chest showed no PE, no infiltrate, some bronchial thickening   - Tamiflu started.  He will finish this at home.   - He was initially on BIPAP but is now weaned to RA.  His oxygen sat is borderline low but he does not have dyspnea despite sats in the range of 87 to 92%.  I think oxygen will be more burden than help to him.      LLL nodule  -d/w patient and daughter  - -pulm consulted for biopsy. Dr Crawford would like resolution of his current infection before invasive biopsy procedures.   - He will f/u in clinic w PET scan to evaluate  mediastinal lymph nodes      Hypertensive urgency  - this improved with diuresis.  HCTZ is added to his regimen.  I discussed with his daughter Columba that in the setting of getting over the flu, I would rather his BP run a little high than risk it going low  - patient, PCP and daughter (an NP) will continue to monitor his BP    Risk of falls  - He is an active man but a bit weaker than baseline.  His family agrees to stay in close touch w him after discharge as he regains strength     Discharge Follow Up Recommendations for outpatient labs/diagnostics:   *PET scan planned for LLL nodule evaluation.  Dr Crawford following.     *PCP followup in 1-2 weeks    Day of Discharge     HPI:   He says he feels fine, denies dyspnea on room air, says he is a bit weaker than normal but thinks he will do fine at home.  He does live alone.  He still works full time and doesn't use a cane or walker.     I've talked to his two daughters, and they are comfortable with him going home.      Review of Systems  Gen- No fevers, chills  CV- No chest pain, palpitations  Resp- mild couhg, no dyspnea  GI- No N/V/D, abd pain    Vital Signs:   Temp:  [97.4 °F (36.3 °C)-97.5 °F (36.4 °C)] 97.4 °F (36.3 °C)  Heart Rate:  [45-63] 63  Resp:  [16-22] 17  BP: (109-170)/(61-92) 152/78  Flow (L/min):  [2] 2      Physical Exam:  Constitutional: Awake, alert and sitting in chair.  Lunch tray is half eaten.  NAD and conversant.  On room air   Eyes: PER, sclerae anicteric, no conjunctival injection  HENT: NCAT, mucous membranes moist  Neck: Supple, trachea midline  Respiratory: Clear to auscultation bilaterally, nonlabored respirations.  On room air, his sats are 87 to 90%    Cardiovascular: RRR, no murmurs  Gastrointestinal: Positive bowel sounds, soft, nontender, nondistended  Musculoskeletal: tr bilateral ankle edema.  He stood up from the chair without difficulty.  He walked in place for a couple minutes without losing balance.     Psychiatric: Appropriate  affect, cooperative  Neurologic: Oriented x 3, strength symmetric in all extremities, Cranial Nerves grossly intact to confrontation, speech clear  Skin: No rashes    Pertinent  and/or Most Recent Results     LAB RESULTS:      Lab 12/16/22 0628 12/14/22 0914 12/13/22 0519 12/12/22 1857   WBC 4.49 4.44 6.07 4.60   HEMOGLOBIN 12.3* 12.8* 12.1* 12.9*   HEMATOCRIT 37.2* 40.9 37.6 40.5   PLATELETS 193 180 166 155   NEUTROS ABS 2.75 2.94 4.83 3.50   IMMATURE GRANS (ABS) 0.01 0.02 0.02 0.01   LYMPHS ABS 1.22 0.90 0.59* 0.62*   MONOS ABS 0.46 0.54 0.62 0.45   EOS ABS 0.04 0.02 0.00 0.01   MCV 94.9 98.1* 96.4 98.1*   LACTATE  --   --   --  1.7         Lab 12/16/22 0628 12/14/22 0914 12/13/22 2057 12/13/22 0519 12/12/22 1857   SODIUM 138 137  --  139 138   POTASSIUM 3.9 4.4 4.0 3.2* 3.5   CHLORIDE 96* 98  --  98 98   CO2 33.0* 27.0  --  30.0* 29.0   ANION GAP 9.0 12.0  --  11.0 11.0   BUN 31* 33*  --  20 18   CREATININE 0.70* 0.79  --  0.75* 0.79   EGFR 89.7 86.5  --  87.9 86.5   GLUCOSE 117* 116*  --  124* 167*   CALCIUM 9.1 8.8  --  8.8 8.7   HEMOGLOBIN A1C  --   --   --  5.10  --          Lab 12/12/22 1857   TOTAL PROTEIN 8.1   ALBUMIN 4.00   GLOBULIN 4.1   ALT (SGPT) 20   AST (SGOT) 33   BILIRUBIN 0.9   ALK PHOS 118*         Lab 12/12/22  1857   PROBNP 1,414.0   TROPONIN T <0.010                 Lab 12/12/22  1950   PH, ARTERIAL 7.367   PCO2, ARTERIAL 51.4*   PO2 .0   FIO2 44   HCO3 ART 29.5*   BASE EXCESS ART 3.2*   CARBOXYHEMOGLOBIN 0.9     Brief Urine Lab Results  (Last result in the past 365 days)      Color   Clarity   Blood   Leuk Est   Nitrite   Protein   CREAT   Urine HCG        08/04/22 1031 Yellow   Cloudy   Trace   Moderate (2+)   Negative   100 mg/dL (2+)               Microbiology Results (last 10 days)     Procedure Component Value - Date/Time    Blood Culture - Blood, Hand, Right [[id number]]  (Normal) Collected: 12/13/22 8922    Lab Status: Preliminary result Specimen: Blood from Hand,  Right Updated: 12/16/22 0600     Blood Culture No growth at 3 days    Blood Culture - Blood, Hand, Left [[id number]]  (Normal) Collected: 12/13/22 0519    Lab Status: Preliminary result Specimen: Blood from Hand, Left Updated: 12/16/22 0600     Blood Culture No growth at 3 days    Legionella Antigen, Urine - Urine, Urine, Clean Catch [[id number]]  (Normal) Collected: 12/13/22 0055    Lab Status: Final result Specimen: Urine, Clean Catch Updated: 12/13/22 1038     LEGIONELLA ANTIGEN, URINE Negative    S. Pneumo Ag Urine or CSF - Urine, Urine, Clean Catch [[id number]]  (Normal) Collected: 12/13/22 0055    Lab Status: Final result Specimen: Urine, Clean Catch Updated: 12/13/22 1038     Strep Pneumo Ag Negative    COVID PRE-OP / PRE-PROCEDURE SCREENING ORDER (NO ISOLATION) - Swab, Nasopharynx [[id number]]  (Abnormal) Collected: 12/12/22 1850    Lab Status: Final result Specimen: Swab from Nasopharynx Updated: 12/12/22 1954    Narrative:      The following orders were created for panel order COVID PRE-OP / PRE-PROCEDURE SCREENING ORDER (NO ISOLATION) - Swab, Nasopharynx.  Procedure                               Abnormality         Status                     ---------                               -----------         ------                     COVID-19 and FLU A/B PCR...[[id number]]  Abnormal            Final result                 Please view results for these tests on the individual orders.    COVID-19 and FLU A/B PCR - Swab, Nasopharynx [[id number]]  (Abnormal) Collected: 12/12/22 1850    Lab Status: Final result Specimen: Swab from Nasopharynx Updated: 12/12/22 1954     COVID19 Not Detected     Influenza A PCR Detected     Influenza B PCR Not Detected    Narrative:      Fact sheet for providers: https://www.fda.gov/media/212409/download    Fact sheet for patients: https://www.fda.gov/media/973190/download    Test performed by PCR.          XR Chest 1 View    Result Date: 12/12/2022  DATE OF EXAM: 12/12/2022 7:20 PM   PROCEDURE: XR CHEST 1 VW-  INDICATIONS: SOA triage protocol  COMPARISON: No comparisons available.  TECHNIQUE: Single radiographic AP view of the chest was obtained.  FINDINGS: Mild cardiomegaly. Interstitial prominence may represents senescent changes. Bibasilar opacities may represent atelectasis or infection. No pleural effusion or pneumothorax. No acute osseous abnormalities. Visualized upper abdomen is unremarkable..      Bibasilar opacities may represent atelectasis or infection.  This report was finalized on 12/12/2022 8:56 PM by Alexei Apodaca.      CT Angiogram Chest    Result Date: 12/13/2022  DATE OF EXAM: 12/12/2022 8:35 PM  PROCEDURE: CT ANGIOGRAM CHEST-  INDICATIONS: hypoxia; J96.01-Acute respiratory failure with hypoxia; J10.1-Influenza due to other identified influenza virus with other respiratory manifestations; Z86.39-Personal history of other endocrine, nutritional and metabolic disease; Z86.79-Personal history of other diseases of the circulatory system  COMPARISON: 6/21/2022  TECHNIQUE: Contiguous axial imaging was obtained from the thoracic inlet through the upper abdomen following the intravenous administration of 85 mL of Isovue 370. Reconstructed coronal and sagittal images were also obtained. Automated exposure control and iterative reconstruction methods were used.  The radiation dose reduction device was turned on for each scan per the ALARA (As Low as Reasonably Achievable) protocol.  FINDINGS: Pulmonary arteries: No evidence of pulmonary embolus. Main pulmonary artery is enlarged measuring 3.3 cm  Heart and pericardium:No flattening of the interventricular septum. Coronary artery calcification is seen. No substantial pericardial effusion.  Vessels:Normal caliber aorta. Atherosclerotic calcification of the aorta. No evidence of acute aortic injury. Venous structures including the superior vena cava and inferior vena cava appear grossly patent.  Mediastinum:Unremarkable appearance of  the esophagus. Few enlarged right hilar lymph nodes measuring up to 18 mm in short axis (image 50), previously 13 mm. Multiple prominent mediastinal lymph nodes also seen. No anterior mediastinal masses.  Lower neck:No suspicious or enlarged supraclavicular or axillary lymphadenopathy. Normal appearance of the thyroid.  Pulmonary parenchyma:22 x 19 mm subsolid nodule in the left lower lobe which is similar in size to prior examination but with increasing solid component (image 60). Minimal dependent atelectasis.  Pleura:No evidence of pleural effusion or pneumothorax.  Airways:Central and segmental airways are clear. Diffuse bronchial wall thickening.  Chest wall and bones:No acute osseous abnormality. Degenerative changes of thoracic spine. Unremarkable appearance of soft tissues.  Upper abdomen:No lesion seen within the visualized liver. Left renal pelvocaliectasis.      No evidence of pulmonary embolism.  Subsolid nodule in the left lower lobe with increasing solid component compared to prior examination concerning for a lesion on the adenocarcinoma spectrum. This can be further evaluated with PET/CT or tissue sampling.  Multiple enlarged right hilar nodes and prominent mediastinal nodes, nonspecific and increased from prior examination.  Left renal pelvocaliectasis which may reflect downstream obstruction. Recommend correlation with any symptoms of left flank pain or urinary symptoms.  Enlarged main pulmonary artery which can be seen in the setting of pulmonary hypertension.  This report was finalized on 12/13/2022 1:08 AM by Alexei Apodaca.                Results for orders placed during the hospital encounter of 08/05/22    Adult Transthoracic Echo Complete W/ Cont if Necessary Per Protocol    Interpretation Summary  · Technically difficult study  · LV systolic function is normal. Estimated LVEF 50%  · Left atrial volume is mildly increased.  · The cardiac valves are anatomically and functionally  normal.      Plan for Follow-up of Pending Labs/Results:  I will follow up   Pending Labs     Order Current Status    Blood Culture - Blood, Hand, Left Preliminary result    Blood Culture - Blood, Hand, Right Preliminary result        Discharge Details        Discharge Medications      New Medications      Instructions Start Date   hydroCHLOROthiazide 25 MG tablet  Commonly known as: HYDRODIURIL   12.5 mg, Oral, Daily   Start Date: December 17, 2022     oseltamivir 75 MG capsule  Commonly known as: TAMIFLU   75 mg, Oral, Every 12 Hours Scheduled         Continue These Medications      Instructions Start Date   allopurinol 300 MG tablet  Commonly known as: ZYLOPRIM   300 mg, Oral, Daily      amiodarone 200 MG tablet  Commonly known as: PACERONE   100 mg, Oral, Daily      amLODIPine 10 MG tablet  Commonly known as: NORVASC   TAKE 1 TABLET EVERY DAY      apixaban 5 MG tablet tablet  Commonly known as: Eliquis   5 mg, Oral, Every 12 Hours      docusate sodium 100 MG capsule  Commonly known as: COLACE   300 mg, Oral, Nightly      finasteride 5 MG tablet  Commonly known as: PROSCAR   5 mg, Oral, Every Night at Bedtime      Iron 240 (27 Fe) MG tablet   1 tablet, Oral, Daily      metFORMIN 1000 MG tablet  Commonly known as: GLUCOPHAGE   TAKE 1 TABLET TWICE DAILY      metoprolol tartrate 100 MG tablet  Commonly known as: LOPRESSOR   TAKE 1 TABLET EVERY 12 HOURS      omeprazole 20 MG capsule  Commonly known as: priLOSEC   TAKE 1 CAPSULE EVERY DAY      pravastatin 40 MG tablet  Commonly known as: PRAVACHOL   TAKE 1 TABLET EVERY DAY      PROBIOTIC ADVANCED PO   1 tablet, Oral, 2 Times Daily      sertraline 50 MG tablet  Commonly known as: Zoloft   50 mg, Oral, Daily      tamsulosin 0.4 MG capsule 24 hr capsule  Commonly known as: FLOMAX   TAKE 1 CAPSULE EVERY DAY      valsartan 80 MG tablet  Commonly known as: DIOVAN   80 mg, Oral, Daily      Vitamin C 500 MG capsule   1 tablet, Oral, 4 Times Daily         Stop These  Medications    furosemide 20 MG tablet  Commonly known as: LASIX            Allergies   Allergen Reactions   • Xarelto [Rivaroxaban] GI Bleeding     GI bleed   • Penicillins Hives     Has tolerated cefepime, ceftriaxone         Discharge Disposition:  Home.        Diet:  Hospital:  Diet Order   Procedures   • Diet: Cardiac Diets, Diabetic Diets; Healthy Heart (2-3 Na+); Consistent Carbohydrate; Texture: Regular Texture (IDDSI 7); Fluid Consistency: Thin (IDDSI 0)       Activity:  As tolerated.        Restrictions or Other Recommendations:  Suggest building up to prior level of activity.         CODE STATUS:    Code Status and Medical Interventions:   Ordered at: 12/12/22 2115     Level Of Support Discussed With:    Patient     Code Status (Patient has no pulse and is not breathing):    CPR (Attempt to Resuscitate)     Medical Interventions (Patient has pulse or is breathing):    Full Support       Future Appointments   Date Time Provider Department Center   12/22/2022  1:00 PM Pito Way MD [facility]   12/28/2022  4:00 PM Pito Way MD [facility]   [facility]20[facility]   1/4/2023  9:45 AM [facility] [place] [facility]  [facility]   1/10/2023 11:00 AM Vikram Eagle MD [facility] None   4/11/2023 11:20 AM Blanquita Ansari APRN [facility]                 Chely Stephens MD  12/16/22      Time Spent on Discharge:  I spent  40  minutes on this discharge activity which included: face-to-face encounter with the patient, reviewing the data in the system, coordination of the care with the nursing staff as well as consultants, documentation, and entering orders.

## 2022-12-16 NOTE — OUTREACH NOTE
Prep Survey    Flowsheet Row Responses   [facility] discharged from? [place]   Is LACE score < 7 ? No   Eligibility [facility]   Date of Admission 12/12/22   Date of Discharge 12/16/22   Discharge Disposition Home or Self Care   Discharge diagnosis Acute hypoxic resp failure, Influenza A, hypertensive urgency, A-fib, T2DM   Does the patient have one of the following disease processes/diagnoses(primary or secondary)? Other   Does the patient have Home health ordered? No   Is there a DME ordered? No   Prep survey completed? Yes          CAMILLA Burton Registered Nurse

## 2022-12-18 LAB
BACTERIA SPEC AEROBE CULT: NORMAL
BACTERIA SPEC AEROBE CULT: NORMAL

## 2022-12-19 ENCOUNTER — TRANSITIONAL CARE MANAGEMENT TELEPHONE ENCOUNTER (OUTPATIENT)
Dept: CALL CENTER | Facility: HOSPITAL | Age: 87
End: 2022-12-19

## 2022-12-19 ENCOUNTER — NURSE NAVIGATOR (OUTPATIENT)
Dept: ONCOLOGY | Facility: CLINIC | Age: 87
End: 2022-12-19

## 2022-12-19 NOTE — PROGRESS NOTES
LVM for daughter regarding PET and lung nodule clinic appointments. Provided return contact information.     Addendum: Daughter returned call. Notified her of PET and lung nodule clinic dates, times, locations and instructions. She v/u and agreeable to plan. Encouraged her to call with questions or concerns.

## 2022-12-19 NOTE — OUTREACH NOTE
Call Center TCM Note    Flowsheet Row Responses   [facility] patient discharged from? [place]   Does the patient have one of the following disease processes/diagnoses(primary or secondary)? Other   TCM attempt successful? Yes   Call start time 1323   Call end time 1328   Discharge diagnosis Acute hypoxic resp failure, Influenza A, hypertensive urgency, A-fib, T2DM   Person spoke with today (if not patient) and relationship patient   Meds reviewed with patient/caregiver? Yes  [New: tamiflu, HCTZ.    Stopped: lasix]   Does the patient have all medications ordered at discharge? Yes   Is the patient taking all medications as directed (includes completed medication regime)? Yes   Comments PCP DR Way. HOSPITAL FOLLOW UP 12/22/2022  1:00 PM   Does the patient have an appointment with their PCP within 7 days of discharge? Yes   Has home health visited the patient within 72 hours of discharge? N/A   DME comments Has home O22L. Wore last night for sleep,  using prn today. Monitors with pulse ox.    Psychosocial issues? No   Comments Reports blood pressure is improved.    Did the patient receive a copy of their discharge instructions? Yes   Nursing interventions Reviewed instructions with patient   What is the patient's perception of their health status since discharge? Improving   Is the patient/caregiver able to teach back signs and symptoms related to disease process for when to call PCP? Yes   If the patient is a current smoker, are they able to teach back resources for cessation? Not a smoker   TCM call completed? Yes   Call end time 1328   Would this patient benefit from a Referral to Amb Social Work? No   Is the patient interested in additional calls from an ambulatory ?  NOTE:  applies to high risk patients requiring additional follow-up. No          Shiela Olivo RN    12/19/2022, 13:28 EST

## 2022-12-22 ENCOUNTER — HOSPITAL ENCOUNTER (OUTPATIENT)
Dept: GENERAL RADIOLOGY | Facility: HOSPITAL | Age: 87
Discharge: HOME | End: 2022-12-22
Admitting: INTERNAL MEDICINE

## 2022-12-22 ENCOUNTER — OFFICE VISIT (OUTPATIENT)
Dept: INTERNAL MEDICINE | Facility: CLINIC | Age: 87
End: 2022-12-22
Payer: MEDICARE

## 2022-12-22 VITALS
OXYGEN SATURATION: 91 % | BODY MASS INDEX: 27.72 KG/M2 | RESPIRATION RATE: 18 BRPM | WEIGHT: 221.8 LBS | SYSTOLIC BLOOD PRESSURE: 110 MMHG | DIASTOLIC BLOOD PRESSURE: 54 MMHG | TEMPERATURE: 97.8 F | HEART RATE: 63 BPM

## 2022-12-22 DIAGNOSIS — Z23 IMMUNIZATION DUE: ICD-10-CM

## 2022-12-22 DIAGNOSIS — J10.00 PNEUMONIA DUE TO INFLUENZA A VIRUS: ICD-10-CM

## 2022-12-22 DIAGNOSIS — J10.00 PNEUMONIA DUE TO INFLUENZA A VIRUS: Primary | ICD-10-CM

## 2022-12-22 PROCEDURE — 91312 COVID-19 (PFIZER) BIVALENT BOOSTER 12+YRS: CPT | Performed by: INTERNAL MEDICINE

## 2022-12-22 PROCEDURE — 99495 TRANSJ CARE MGMT MOD F2F 14D: CPT | Performed by: INTERNAL MEDICINE

## 2022-12-22 PROCEDURE — 1111F DSCHRG MED/CURRENT MED MERGE: CPT | Performed by: INTERNAL MEDICINE

## 2022-12-22 PROCEDURE — G0008 ADMIN INFLUENZA VIRUS VAC: HCPCS | Performed by: INTERNAL MEDICINE

## 2022-12-22 PROCEDURE — 90662 IIV NO PRSV INCREASED AG IM: CPT | Performed by: INTERNAL MEDICINE

## 2022-12-22 PROCEDURE — 71046 X-RAY EXAM CHEST 2 VIEWS: CPT

## 2022-12-22 PROCEDURE — 0124A PR ADM SARSCOV2 30MCG/0.3ML BST: CPT | Performed by: INTERNAL MEDICINE

## 2022-12-22 RX ORDER — DOXYCYCLINE 100 MG/1
100 CAPSULE ORAL 2 TIMES DAILY
Qty: 20 CAPSULE | Refills: 0 | Status: SHIPPED | OUTPATIENT
Start: 2022-12-22 | End: 2023-01-01

## 2022-12-27 ENCOUNTER — READMISSION MANAGEMENT (OUTPATIENT)
Dept: CALL CENTER | Facility: HOSPITAL | Age: 87
End: 2022-12-27

## 2022-12-27 NOTE — OUTREACH NOTE
Medical Week 2 Survey    Flowsheet Row Responses   [facility] patient discharged from? [place]   Does the patient have one of the following disease processes/diagnoses(primary or secondary)? Other   Week 2 attempt successful? Yes   Call start time 1111   Discharge diagnosis Acute hypoxic resp failure, Influenza A, hypertensive urgency, A-fib, T2DM   Call end time 1129   Person spoke with today (if not patient) and relationship Nikki- dtr   Medication alerts for this patient PCP prescribed Doxycycline at f/u appt r/t freq cough x7 days.   Meds reviewed with patient/caregiver? Yes   Is the patient having any side effects they believe may be caused by any medication additions or changes? Yes   Side effects comments  dizziness   Is the patient taking all medications as directed (includes completed medication regime)? Yes   Does the patient have a primary care provider?  Yes   Does the patient have an appointment with their PCP within 7 days of discharge? Yes   Has the patient kept scheduled appointments due by today? Yes   DME comments Using CPAP but dtr reports that he has no oxygen at home. They have a pulse ox at home but not monitoring sats. Dtr will check today.    Psychosocial issues? No   Comments Pt continues with cough, having dizzy spells per dtr, almost fell in shower. Discussed fall safety.Dizziness is random, per dtr. Suggested monitoring BP/RA sats at home w/different activity& sitting. Pt does IS at home but not often, dtr to encourage. Pt does not monitor glucose at home.    What is the patient's perception of their health status since discharge? Same   Is the patient/caregiver able to teach back the hierarchy of who to call/visit for symptoms/problems? PCP, Specialist, Home health nurse, Urgent Care, ED, 911 Yes   Week 2 Call Completed? Yes          ROXANNA MONTANEZ - Registered Nurse

## 2023-01-04 ENCOUNTER — HOSPITAL ENCOUNTER (OUTPATIENT)
Dept: PET IMAGING | Facility: HOSPITAL | Age: 88
Discharge: HOME | End: 2023-01-04
Payer: MEDICARE

## 2023-01-04 DIAGNOSIS — R91.1 LUNG NODULE: ICD-10-CM

## 2023-01-04 LAB — GLUCOSE BLDC GLUCOMTR-MCNC: 94 MG/DL (ref 70–130)

## 2023-01-04 PROCEDURE — 82962 GLUCOSE BLOOD TEST: CPT

## 2023-01-04 PROCEDURE — A9552 F18 FDG: HCPCS | Performed by: INTERNAL MEDICINE

## 2023-01-04 PROCEDURE — 78815 PET IMAGE W/CT SKULL-THIGH: CPT

## 2023-01-04 PROCEDURE — 0 FLUDEOXYGLUCOSE F18 SOLUTION: Performed by: INTERNAL MEDICINE

## 2023-01-04 RX ADMIN — FLUDEOXYGLUCOSE F18 1 DOSE: 300 INJECTION INTRAVENOUS at 09:02

## 2023-01-05 ENCOUNTER — READMISSION MANAGEMENT (OUTPATIENT)
Dept: CALL CENTER | Facility: HOSPITAL | Age: 88
End: 2023-01-05
Payer: MEDICARE

## 2023-01-05 NOTE — OUTREACH NOTE
Medical Week 3 Survey    Flowsheet Row Responses   [facility] [place] patient discharged from? [place]   Does the patient have one of the following disease processes/diagnoses(primary or secondary)? Other   Week 3 attempt successful? Yes   Call start time 1504   Call end time 1505   Discharge diagnosis Acute hypoxic resp failure, Influenza A, hypertensive urgency, A-fib, T2DM   Meds reviewed with patient/caregiver? Yes   Is the patient having any side effects they believe may be caused by any medication additions or changes? No   Does the patient have all medications ordered at discharge? N/A   Is the patient taking all medications as directed (includes completed medication regime)? Yes   Has the patient kept scheduled appointments due by today? Yes   Has home health visited the patient within 72 hours of discharge? N/A   Did the patient receive a copy of their discharge instructions? Yes   Nursing interventions Reviewed instructions with patient   What is the patient's perception of their health status since discharge? Improving   Is the patient/caregiver able to teach back signs and symptoms related to disease process for when to call PCP? Yes   Is the patient/caregiver able to teach back signs and symptoms related to disease process for when to call 911? Yes   Is the patient/caregiver able to teach back the hierarchy of who to call/visit for symptoms/problems? PCP, Specialist, Home health nurse, Urgent Care, ED, 911 Yes   If the patient is a current smoker, are they able to teach back resources for cessation? Not a smoker   Week 3 Call Completed? Yes   Is the patient interested in additional calls from an ambulatory ?  NOTE:  applies to high risk patients requiring additional follow-up. No          YAMILET GARCIA - Registered Nurse

## 2023-01-09 DIAGNOSIS — F32.9 REACTIVE DEPRESSION: ICD-10-CM

## 2023-01-09 DIAGNOSIS — E78.5 HYPERLIPIDEMIA LDL GOAL <100: ICD-10-CM

## 2023-01-09 DIAGNOSIS — I48.0 PAROXYSMAL ATRIAL FIBRILLATION: ICD-10-CM

## 2023-01-09 DIAGNOSIS — I48.0 PAROXYSMAL ATRIAL FIBRILLATION: Chronic | ICD-10-CM

## 2023-01-10 ENCOUNTER — OFFICE VISIT (OUTPATIENT)
Dept: PULMONOLOGY | Facility: CLINIC | Age: 88
End: 2023-01-10
Payer: MEDICARE

## 2023-01-10 ENCOUNTER — NURSE NAVIGATOR (OUTPATIENT)
Dept: ONCOLOGY | Facility: CLINIC | Age: 88
End: 2023-01-10
Payer: MEDICARE

## 2023-01-10 VITALS
WEIGHT: 245.4 LBS | DIASTOLIC BLOOD PRESSURE: 92 MMHG | HEIGHT: 75 IN | HEART RATE: 76 BPM | SYSTOLIC BLOOD PRESSURE: 166 MMHG | TEMPERATURE: 97.5 F | OXYGEN SATURATION: 96 % | BODY MASS INDEX: 30.51 KG/M2

## 2023-01-10 DIAGNOSIS — R91.1 LUNG NODULE: Primary | ICD-10-CM

## 2023-01-10 PROCEDURE — 99213 OFFICE O/P EST LOW 20 MIN: CPT | Performed by: INTERNAL MEDICINE

## 2023-01-10 RX ORDER — OMEPRAZOLE 20 MG/1
20 CAPSULE, DELAYED RELEASE ORAL DAILY
Qty: 90 CAPSULE | Refills: 1 | Status: SHIPPED | OUTPATIENT
Start: 2023-01-10

## 2023-01-10 RX ORDER — ALLOPURINOL 300 MG/1
300 TABLET ORAL DAILY
Qty: 90 TABLET | Refills: 1 | Status: SHIPPED | OUTPATIENT
Start: 2023-01-10

## 2023-01-10 RX ORDER — PRAVASTATIN SODIUM 40 MG
40 TABLET ORAL DAILY
Qty: 90 TABLET | Refills: 1 | Status: SHIPPED | OUTPATIENT
Start: 2023-01-10

## 2023-01-10 RX ORDER — METOPROLOL TARTRATE 100 MG/1
100 TABLET ORAL EVERY 12 HOURS
Qty: 180 TABLET | Refills: 0 | Status: SHIPPED | OUTPATIENT
Start: 2023-01-10

## 2023-01-10 RX ORDER — FINASTERIDE 5 MG/1
5 TABLET, FILM COATED ORAL
Qty: 90 TABLET | Refills: 1 | Status: SHIPPED | OUTPATIENT
Start: 2023-01-10

## 2023-01-10 RX ORDER — AMIODARONE HYDROCHLORIDE 200 MG/1
100 TABLET ORAL DAILY
Qty: 90 TABLET | Refills: 1 | Status: SHIPPED | OUTPATIENT
Start: 2023-01-10

## 2023-01-10 RX ORDER — VALSARTAN 80 MG/1
80 TABLET ORAL DAILY
Qty: 90 TABLET | Refills: 1 | Status: SHIPPED | OUTPATIENT
Start: 2023-01-10

## 2023-01-10 RX ORDER — TAMSULOSIN HYDROCHLORIDE 0.4 MG/1
1 CAPSULE ORAL DAILY
Qty: 90 CAPSULE | Refills: 1 | Status: SHIPPED | OUTPATIENT
Start: 2023-01-10

## 2023-01-10 NOTE — PROGRESS NOTES
PULMONARY FOLLOW-UP OUTPATIENT NOTE:     Patient ID/Chief Complaint: .Darien Camarena is a 87 y.o. male presenting for follow up on lung nodule.     History of Present Illness: Patient Isidro is an 87 year old male with past medical history of hypertension, CAD, atrial fibrillation, HLD and STEVEN.  He is being evaluated in clinic following a recent hospitalization for acute hypoxic respiratory failure secondary to influenza A  in December 2022.  Thoracic imaging during hospitalization revealed an incidental left lower lobe pulmonary nodule. This was followed up with a PET scan earlier this month (1/04/2023) demonstrating avidity.  He denies ongoing respiratory symptoms.    PFSH: Reviewed in EMR, Significant Changes Noted Above    Review of Systems: Fourteen point review of systems performed and negative except for those issues listed in HPI    Medications:  Current Outpatient Medications   Medication Sig Dispense Refill   • allopurinol (ZYLOPRIM) 300 MG tablet Take 1 tablet by mouth Daily. 90 tablet 1   • amiodarone (PACERONE) 200 MG tablet Take 0.5 tablets by mouth Daily. 90 tablet 1   • amLODIPine (NORVASC) 10 MG tablet TAKE 1 TABLET EVERY DAY 90 tablet 1   • apixaban (Eliquis) 5 MG tablet tablet Take 1 tablet by mouth Every 12 (Twelve) Hours. 180 tablet 1   • Ascorbic Acid (VITAMIN C) 500 MG capsule Take 1 tablet by mouth 4 (four) times a day.     • docusate sodium (COLACE) 100 MG capsule Take 300 mg by mouth every night.     • Ferrous Gluconate (IRON) 240 (27 FE) MG tablet Take 1 tablet by mouth daily.     • finasteride (PROSCAR) 5 MG tablet Take 1 tablet by mouth every night at bedtime. 90 tablet 1   • hydroCHLOROthiazide (HYDRODIURIL) 25 MG tablet Take 0.5 tablets by mouth Daily. 30 tablet 2   • metFORMIN (GLUCOPHAGE) 1000 MG tablet Take 1 tablet by mouth 2 (Two) Times a Day. 180 tablet 1   • metoprolol tartrate (LOPRESSOR) 100 MG tablet Take 1 tablet by mouth Every 12 (Twelve) Hours. 180 tablet 0   •  "omeprazole (priLOSEC) 20 MG capsule Take 1 capsule by mouth Daily. 90 capsule 1   • pravastatin (PRAVACHOL) 40 MG tablet Take 1 tablet by mouth Daily. 90 tablet 1   • Probiotic Product (PROBIOTIC ADVANCED PO) Take 1 tablet by mouth 2 (Two) Times a Day.     • sertraline (Zoloft) 50 MG tablet Take 1 tablet by mouth Daily. 90 tablet 0   • tamsulosin (FLOMAX) 0.4 MG capsule 24 hr capsule Take 1 capsule by mouth Daily. 90 capsule 1   • valsartan (DIOVAN) 80 MG tablet Take 1 tablet by mouth Daily. 90 tablet 1     No current facility-administered medications for this visit.     Immunizations:  Chart reviewed: immunizations are up to date and documented.  Immunization History   Administered Date(s) Administered   • COVID-19 (PFIZER) BIVALENT BOOSTER 12+YRS 12/22/2022   • COVID-19 (PFIZER) PURPLE CAP 01/26/2021, 02/19/2021   • Fluzone High Dose =>65 Years ([facility] ONLY) 10/01/2016, 09/18/2017, 09/15/2018, 10/12/2019, 09/18/2020   • Fluzone High-Dose 65+yrs 09/19/2020, 09/25/2021, 12/22/2022   • Hepatitis A 09/15/2018, 11/01/2018   • Pneumococcal Conjugate 13-Valent (PCV13) 10/26/2015   • Pneumococcal Polysaccharide (PPSV23) 06/24/2006, 09/18/2020   • Pneumococcal, Unspecified 11/30/2006   • Shingrix 09/25/2021   • Td 06/24/2005   • Tdap 06/14/2018       Physical Examination:  Vital Signs: Blood pressure 166/92, pulse 76, temperature 97.5 °F (36.4 °C), temperature source Infrared, height 190.5 cm (75\"), weight 111 kg (245 lb 6.4 oz), SpO2 96 %.    General: The patient appears in no acute distress.  Alert, cooperative and interactive.   HEENT: NC/AT, PERRL, Normal nasal mucosa, MMM.  Neck: Trachea midline, No masses, No JVD.  Lymphadenopathy: No palpable anterior cervical, submandibular, submental, or posterior cervical lymphadenopathy.  No palpable supra- or infraclavicular lymphadenopathy.   Chest: Clear to auscultation bilaterally, No wheezing, rhonchi, or rales. No end-expiratory wheeze with forced expiratory maneuvers.  " Normal work of breathing. Equal chest rise.  Cardiac: Regular rhythm, normal rate, S1S2 auscultated. No murmurs, rubs or gallops.   Abdomen: Soft, non-tender, non-distended, positive bowel sounds in all four quadrants.   Extremities: No lower extremity edema. No clubbing or cyanosis.  Skin: No rashes, open wounds, or bruising.  Warm, dry, well-perfused.  Neuro: Motor power grossly intact bilaterally, CN II-XII grossly intact.  Sensation intact.  Speech fluid and fluent.  Thought process coherent.  Psych: Alert and oriented x 3, Mood stable.    Review of Data:  Laboratory Studies: I personally reviewed recent lab work in EMR    Radiology Results: I have personally reviewed and interpreted the images in EMR    PET (1/22/2023):  Radiology Impression:   The subsolid left lower lobe nodule is mildly hypermetabolic, highly concerning for primary malignancy. Tissue sampling is recommended. No enlarged or hypermetabolic hilar or mediastinal lymph nodes to suggest prabhu metastases at this time. No evidence   of distant metastatic disease.    LLL Pulmonary Nodule    - Discussed nodule with both patient and daughter.  Personally reviewed recent CTA chest as well as PET scan.  Compared with prior CTA from June 2022 where opacities in the area of question make it difficult to appreciate presence of nodule. Nevertheless, CTA from 12/12/2022 shows a subsolid nodule in patient's left lower lobe with an increasing solid component.  Follow-up PET scan on 1/04/2023 with avidity.  Radiology concern for primary malignancy.    - Presented options to patient and family including: repeat imaging, bronchoscopic evaluation and empiric radiation.  After discussing risk/benefits of each choice--empiric radiation was selected.  Referred patient to radiation oncology for evaluation. Will follow-up later this month for PFT.     -- Jose Eagle MD   Pulmonary/Critical Care    Approximately 30 minutes were spent reviewing pertinent  medical/family/social history, performing physical exam, clinically evaluating, interpreting labs/imaging, ordering necessary studies (i.e. medication, tests, procedures) and counseling patient. This does not include time spent with patient by nursing or clerical staff.

## 2023-01-10 NOTE — PROGRESS NOTES
Met patient and daughter in lung nodule clinic with Dr. Eagle. He is retired from [employer] but still works daily at [employer]. He was recently admitted for Flu A and pneumonia. While inpatient incidentally found to have 1.8cm LLL semi-solid nodule. Subsequent PET after discharge with SUV 3.8 in this location. Patient did have x18lb weight loss during illness but has regained most of this weight back. He denies hemoptysis or other new or worsening respiratory complaints. He is still regaining strength after hospitalization. Scans reviewed. Per Dr. Eagle options are to repeat CT chest x3mo, attempt bakari bronch but yield is likely to be 50% at best, or refer to radiation oncology for potential imperical radiation. Patient and daughter would like to pursue radiation oncology referral if radiation oncologist is agreeable to recommendation. Dr. Eagle will connect with them to discuss and inquire if fiducial placement will be needed. Patient will return to clinic x2 wks for PFT's and discuss final plan. They v/u and agreeable. Introduced lung navigator role and provided contact information. Daughter, Columba, requests to receive phone calls as patient is [place] and she has medical background. Patient is agreeable. They know to call with questions or concerns.

## 2023-01-16 DIAGNOSIS — I48.0 PAROXYSMAL ATRIAL FIBRILLATION: ICD-10-CM

## 2023-01-16 NOTE — PROGRESS NOTES
Chief Complaint   Patient presents with   • [facility]     Hospital fu   • URI       History of Present Illness    The patient presents to the office for a follow-up visit from a recent hospitalization. The patient was hospitalized from 12-Dec-2022 through 16-Dec-2022 with pneumonia, Influenza A and respiratory failure. The records have been received and reviewed. The medication list has been reconciled. The hospital stay was uncomplicated.     Hospital Course (copied and pasted from discharge summary- reviewed with patient in detail):   Darien Camarena is a 87 y.o. male w a hx of HTN, STEVEN. He lives at home alone and works full time. He developed URI symptoms several days prior to presentation, and came to ED where Flu A was diagnosed.         Acute hypoxemic respiratory failure   Influenza A   -CTA chest showed no PE, no infiltrate, some bronchial thickening    - Tamiflu started. He will finish this at home.    - He was initially on BIPAP but is now weaned to RA. His oxygen sat is borderline low but he does not have dyspnea despite sats in the range of 87 to 92%. I think oxygen will be more burden than help to him.       LLL nodule   -d/w patient and daughter   - -pulm consulted for biopsy. Dr Crawford would like resolution of his current infection before invasive biopsy procedures.    - He will f/u in clinic w PET scan to evaluate mediastinal lymph nodes      Hypertensive urgency   - this improved with diuresis. HCTZ is added to his regimen. I discussed with his daughter Columba that in the setting of getting over the flu, I would rather his BP run a little high than risk it going low   - patient, PCP and daughter (an NP) will continue to monitor his BP      Risk of falls   - He is an active man but a bit weaker than baseline. His family agrees to stay in close touch w him after discharge as he regains strength       Discharge Follow Up Recommendations for outpatient labs/diagnostics:   *PET scan planned for LLL  nodule evaluation. Dr Crawford following.       *PCP followup in 1-2 weeks        Since leaving the hospital he reports that he is back to normal. He denies abdominal pain, itchy watery eyes, bone pain, chills, congestion, a dry cough, a wet cough, decreased appetite, diarrhea, dysuria, ear drainage, ear pain, eye drainage, facial pain, fever, a headache, joint pain, myalgias, nasal discharge, nausea, neck stiffness, night sweats, a rash, sneezing, a sore throat, vomiting or wheezing. He also reports that he does not have chest pain, dyspnea, edema, syncope, blurred vision or palpitations.    The patient presents for a follow-up related to pneumonia. He states that he completed his antibiotics. He denies a persistent cough. The patient denies a fever.    Medications      Current Outpatient Medications:   •  amLODIPine (NORVASC) 10 MG tablet, TAKE 1 TABLET EVERY DAY, Disp: 90 tablet, Rfl: 1  •  apixaban (Eliquis) 5 MG tablet tablet, Take 1 tablet by mouth Every 12 (Twelve) Hours., Disp: 180 tablet, Rfl: 1  •  Ascorbic Acid (VITAMIN C) 500 MG capsule, Take 1 tablet by mouth 4 (four) times a day., Disp: , Rfl:   •  docusate sodium (COLACE) 100 MG capsule, Take 300 mg by mouth every night., Disp: , Rfl:   •  Ferrous Gluconate (IRON) 240 (27 FE) MG tablet, Take 1 tablet by mouth daily., Disp: , Rfl:   •  hydroCHLOROthiazide (HYDRODIURIL) 25 MG tablet, Take 0.5 tablets by mouth Daily., Disp: 30 tablet, Rfl: 2  •  Probiotic Product (PROBIOTIC ADVANCED PO), Take 1 tablet by mouth 2 (Two) Times a Day., Disp: , Rfl:   •  allopurinol (ZYLOPRIM) 300 MG tablet, Take 1 tablet by mouth Daily., Disp: 90 tablet, Rfl: 1  •  amiodarone (PACERONE) 200 MG tablet, Take 0.5 tablets by mouth Daily., Disp: 90 tablet, Rfl: 1  •  finasteride (PROSCAR) 5 MG tablet, Take 1 tablet by mouth every night at bedtime., Disp: 90 tablet, Rfl: 1  •  metFORMIN (GLUCOPHAGE) 1000 MG tablet, Take 1 tablet by mouth 2 (Two) Times a Day., Disp: 180 tablet, Rfl:  1  •  metoprolol tartrate (LOPRESSOR) 100 MG tablet, Take 1 tablet by mouth Every 12 (Twelve) Hours., Disp: 180 tablet, Rfl: 0  •  omeprazole (priLOSEC) 20 MG capsule, Take 1 capsule by mouth Daily., Disp: 90 capsule, Rfl: 1  •  pravastatin (PRAVACHOL) 40 MG tablet, Take 1 tablet by mouth Daily., Disp: 90 tablet, Rfl: 1  •  sertraline (Zoloft) 50 MG tablet, Take 1 tablet by mouth Daily., Disp: 90 tablet, Rfl: 0  •  tamsulosin (FLOMAX) 0.4 MG capsule 24 hr capsule, Take 1 capsule by mouth Daily., Disp: 90 capsule, Rfl: 1  •  valsartan (DIOVAN) 80 MG tablet, Take 1 tablet by mouth Daily., Disp: 90 tablet, Rfl: 1  Current outpatient and discharge medications have been reconciled for the patient.  Reviewed by: Pito Way MD    Allergies    Allergies   Allergen Reactions   • Xarelto [Rivaroxaban] GI Bleeding     GI bleed   • Penicillins Hives     Has tolerated cefepime, ceftriaxone       Problem List    Patient Active Problem List   Diagnosis   • Atopic rhinitis   • Benign (familial) paraproteinemia   • Type 2 diabetes mellitus with stage 3 chronic kidney disease, without long-term current use of insulin (HCC)   • Enlarged prostate without lower urinary tract symptoms (luts)   • Gastroesophageal reflux disease with esophagitis   • Polyp of gallbladder   • Gout   • Liver cyst   • Hyperlipidemia LDL goal <100   • Essential hypertension   • Nephrolithiasis   • Obstructive sleep apnea syndrome   • Paroxysmal atrial fibrillation (HCC)   • Stage 3 chronic kidney disease (HCC)   • Long term current use of antiarrhythmic medical therapy   • Hydronephrosis   • Coronary artery disease involving native coronary artery of native heart with angina pectoris (HCC)   • Acute respiratory failure with hypoxia (HCC)   • Influenza A   • Hypertensive urgency   • Type 2 diabetes mellitus (HCC)       Medications, Allergies, Problems List and Past History were reviewed and updated.    Physical Examination    /54 (BP Location:  Left arm, Patient Position: Sitting, Cuff Size: Adult)   Pulse 63   Temp 97.8 °F (36.6 °C) (Temporal)   Resp 18   Wt 101 kg (221 lb 12.8 oz)   SpO2 91%   BMI 27.72 kg/m²     HEENT: Head- Normocephalic Atraumatic. Facies- Within normal limits. Pinnas- Normal texture and shape bilaterally. Canals- Normal bilaterally. TMs- Normal bilaterally. Nares- Patent bilaterally. Nasal Septum- is normal. There is no tenderness to palpation over the frontal or maxillary sinuses. Lids- Normal bilaterally. Conjunctiva- Clear bilaterally. Sclera- Anicteric bilaterally. Oropharynx- Moist with no lesions. Tonsils- No enlargement, erythema or exudate.    Neck: Thyroid- non enlarged, symmetric and has no nodules. No bruits are detected. ROM- Normal Range of Motion with no rigidity.    Lungs: Auscultation- Clear to auscultation bilaterally. There are no retractions, clubbing or cyanosis. The Expiratory to Inspiratory ratio is equal.    Lymph Nodes: Cervical- no enlarged lymph nodes noted. Clavicular- Deferred. Axillary- Deferred. Inguinal- Deferred.    Cardiovascular: There are no carotid bruits. Heart- Normal Rate with Regular rhythm and no murmurs. There are no gallops. There are no rubs. In the lower extremities there is no edema. The upper extremities do not have edema.    Abdomen: Soft, benign, non-tender with no masses, hernias, organomegaly or scars.    Impression and Assessment    Pneumonia.    Plan    Pneumonia Plan: Continue to monitor. He will need follow-up of pulmonary nodule.    Diagnoses and all orders for this visit:    1. Pneumonia due to influenza A virus (Primary)  -     XR Chest PA & Lateral; Future  -     doxycycline (MONODOX) 100 MG capsule; Take 1 capsule by mouth 2 (Two) Times a Day for 10 days.  Dispense: 20 capsule; Refill: 0    2. Immunization due  -     Fluzone High-Dose 65+yrs  -     COVID-19 Bivalent Booster (Pfizer) 12+yrs      Transitional Care Management Certification  I certify that the following  are true:  1. Communication was made within 2 business days of discharge.  2. Complexity of Medical Decision Making is moderate.  3. Face to face visit occurred within 6 days.    *Note: 02861 is for high complexity patients with a face to face visit within 7 days of discharge.  52698 is for high complexity patients with a face to face on days 8-14 post discharge or medium complexity with face to face visit within 14 days post discharge.      Return to Office    The patient was instructed to return for follow-up in 1 month. The patient was instructed to return sooner if the condition changes, worsens, or does not resolve.

## 2023-01-18 ENCOUNTER — TELEPHONE (OUTPATIENT)
Dept: CARDIOLOGY | Facility: CLINIC | Age: 88
End: 2023-01-18
Payer: MEDICARE

## 2023-01-18 NOTE — TELEPHONE ENCOUNTER
Recent studies show no evidence of cardiac disease to account for his symptoms.  proBNP not particularly elevated given his age.  They should continue to pursue pulmonary causes for shortness of breath, and follow-up with Dr. Eagle.

## 2023-01-18 NOTE — TELEPHONE ENCOUNTER
Patient's daughter called to ask for an ASAP appt for his increased shortness of breath mostly on exertion. She reports that he was in the hospital recently for respiratory failure and found to have Flu A. While he was there pulmonology was consulted for biopsy for LLL nodule. It was deferred and recommended a PET scan to evaluate mediastinal lymph nodes. Has 18 lb weight loss during illness but gained some back.     ProBNP 12/12/2022 was 1414.  CXR 12/22/2022- No acute cardiopulmonary process.     He saw pulmonology 1/10/23 but the note is unsigned so I can not see the plan. He has a f/u with pulmonology 1/24/2023.     Last LHC 9/29/2022- Mild CAD.   Last echo 8/8/2022- EF 50%.     Did you want any testing or follow up?

## 2023-01-18 NOTE — TELEPHONE ENCOUNTER
Columba says he feels better now. After he was at work, he was able to walk around with no issues. He does not think he is in afib because he can usually tell. He thinks that he is mainly short of breath upon awakening in the AM after taking his CPAP off. Advised her to bring that up at his pulmonology appt next week. He will call if he has any other issues or thinks he is in afib.

## 2023-01-23 DIAGNOSIS — I10 ESSENTIAL HYPERTENSION: Chronic | ICD-10-CM

## 2023-01-23 RX ORDER — HYDROCHLOROTHIAZIDE 12.5 MG/1
CAPSULE, GELATIN COATED ORAL
Qty: 90 CAPSULE | Refills: 0 | Status: SHIPPED | OUTPATIENT
Start: 2023-01-23 | End: 2023-01-24

## 2023-01-23 RX ORDER — HYDROCHLOROTHIAZIDE 12.5 MG/1
CAPSULE, GELATIN COATED ORAL
Qty: 90 CAPSULE | Refills: 0 | OUTPATIENT
Start: 2023-01-23

## 2023-01-24 ENCOUNTER — OFFICE VISIT (OUTPATIENT)
Dept: INTERNAL MEDICINE | Facility: CLINIC | Age: 88
End: 2023-01-24
Payer: MEDICARE

## 2023-01-24 ENCOUNTER — OFFICE VISIT (OUTPATIENT)
Dept: PULMONOLOGY | Facility: CLINIC | Age: 88
End: 2023-01-24
Payer: MEDICARE

## 2023-01-24 VITALS
HEART RATE: 86 BPM | BODY MASS INDEX: 31.71 KG/M2 | OXYGEN SATURATION: 92 % | TEMPERATURE: 98.4 F | HEIGHT: 75 IN | RESPIRATION RATE: 16 BRPM | DIASTOLIC BLOOD PRESSURE: 98 MMHG | SYSTOLIC BLOOD PRESSURE: 172 MMHG | WEIGHT: 255 LBS

## 2023-01-24 VITALS
WEIGHT: 253 LBS | DIASTOLIC BLOOD PRESSURE: 82 MMHG | BODY MASS INDEX: 34.27 KG/M2 | SYSTOLIC BLOOD PRESSURE: 168 MMHG | HEART RATE: 64 BPM | TEMPERATURE: 98.2 F | RESPIRATION RATE: 18 BRPM | HEIGHT: 72 IN

## 2023-01-24 DIAGNOSIS — I48.0 PAROXYSMAL ATRIAL FIBRILLATION: ICD-10-CM

## 2023-01-24 DIAGNOSIS — J96.01 ACUTE RESPIRATORY FAILURE WITH HYPOXIA: ICD-10-CM

## 2023-01-24 DIAGNOSIS — Z12.5 PROSTATE CANCER SCREENING: ICD-10-CM

## 2023-01-24 DIAGNOSIS — R31.9 HEMATURIA, UNSPECIFIED TYPE: ICD-10-CM

## 2023-01-24 DIAGNOSIS — E11.9 TYPE 2 DIABETES MELLITUS WITHOUT COMPLICATION, WITHOUT LONG-TERM CURRENT USE OF INSULIN: ICD-10-CM

## 2023-01-24 DIAGNOSIS — K21.00 GASTROESOPHAGEAL REFLUX DISEASE WITH ESOPHAGITIS WITHOUT HEMORRHAGE: ICD-10-CM

## 2023-01-24 DIAGNOSIS — F32.9 REACTIVE DEPRESSION: ICD-10-CM

## 2023-01-24 DIAGNOSIS — Z00.00 PHYSICAL EXAM: ICD-10-CM

## 2023-01-24 DIAGNOSIS — I10 ESSENTIAL HYPERTENSION: ICD-10-CM

## 2023-01-24 DIAGNOSIS — Z00.00 MEDICARE ANNUAL WELLNESS VISIT, SUBSEQUENT: Primary | ICD-10-CM

## 2023-01-24 DIAGNOSIS — R06.02 SHORTNESS OF BREATH: Primary | ICD-10-CM

## 2023-01-24 DIAGNOSIS — E78.5 HYPERLIPIDEMIA LDL GOAL <100: ICD-10-CM

## 2023-01-24 LAB
BASOPHILS # BLD AUTO: 0.05 10*3/MM3 (ref 0–0.2)
BASOPHILS NFR BLD AUTO: 0.8 % (ref 0–1.5)
BILIRUB BLD-MCNC: NEGATIVE MG/DL
BILIRUB UR QL STRIP: NEGATIVE
CLARITY UR: ABNORMAL
CLARITY, POC: ABNORMAL
COLOR UR: ABNORMAL
COLOR UR: YELLOW
DEPRECATED RDW RBC AUTO: 47.1 FL (ref 37–54)
EOSINOPHIL # BLD AUTO: 0.13 10*3/MM3 (ref 0–0.4)
EOSINOPHIL NFR BLD AUTO: 2 % (ref 0.3–6.2)
ERYTHROCYTE [DISTWIDTH] IN BLOOD BY AUTOMATED COUNT: 13.8 % (ref 12.3–15.4)
EXPIRATION DATE: ABNORMAL
GLUCOSE UR STRIP-MCNC: NEGATIVE MG/DL
GLUCOSE UR STRIP-MCNC: NEGATIVE MG/DL
HCT VFR BLD AUTO: 29.9 % (ref 37.5–51)
HGB BLD-MCNC: 9.1 G/DL (ref 13–17.7)
HGB UR QL STRIP.AUTO: NEGATIVE
IMM GRANULOCYTES # BLD AUTO: 0.04 10*3/MM3 (ref 0–0.05)
IMM GRANULOCYTES NFR BLD AUTO: 0.6 % (ref 0–0.5)
KETONES UR QL STRIP: NEGATIVE
KETONES UR QL: NEGATIVE
LEUKOCYTE EST, POC: ABNORMAL
LEUKOCYTE ESTERASE UR QL STRIP.AUTO: ABNORMAL
LYMPHOCYTES # BLD AUTO: 1.26 10*3/MM3 (ref 0.7–3.1)
LYMPHOCYTES NFR BLD AUTO: 19.1 % (ref 19.6–45.3)
Lab: ABNORMAL
MCH RBC QN AUTO: 28.9 PG (ref 26.6–33)
MCHC RBC AUTO-ENTMCNC: 30.4 G/DL (ref 31.5–35.7)
MCV RBC AUTO: 94.9 FL (ref 79–97)
MONOCYTES # BLD AUTO: 0.65 10*3/MM3 (ref 0.1–0.9)
MONOCYTES NFR BLD AUTO: 9.9 % (ref 5–12)
NEUTROPHILS NFR BLD AUTO: 4.46 10*3/MM3 (ref 1.7–7)
NEUTROPHILS NFR BLD AUTO: 67.6 % (ref 42.7–76)
NITRITE UR QL STRIP: NEGATIVE
NITRITE UR-MCNC: POSITIVE MG/ML
NRBC BLD AUTO-RTO: 0 /100 WBC (ref 0–0.2)
PH UR STRIP.AUTO: 6.5 [PH] (ref 5–8)
PH UR: 6 [PH] (ref 5–8)
PLATELET # BLD AUTO: 256 10*3/MM3 (ref 140–450)
PMV BLD AUTO: 11.1 FL (ref 6–12)
PROT UR QL STRIP: ABNORMAL
PROT UR STRIP-MCNC: ABNORMAL MG/DL
RBC # BLD AUTO: 3.15 10*6/MM3 (ref 4.14–5.8)
RBC # UR STRIP: ABNORMAL /UL
SP GR UR STRIP: 1.01 (ref 1–1.03)
SP GR UR: 1.01 (ref 1–1.03)
UROBILINOGEN UR QL STRIP: ABNORMAL
UROBILINOGEN UR QL: ABNORMAL
WBC NRBC COR # BLD: 6.59 10*3/MM3 (ref 3.4–10.8)

## 2023-01-24 PROCEDURE — 1160F RVW MEDS BY RX/DR IN RCRD: CPT | Performed by: INTERNAL MEDICINE

## 2023-01-24 PROCEDURE — 84443 ASSAY THYROID STIM HORMONE: CPT | Performed by: INTERNAL MEDICINE

## 2023-01-24 PROCEDURE — 1126F AMNT PAIN NOTED NONE PRSNT: CPT | Performed by: INTERNAL MEDICINE

## 2023-01-24 PROCEDURE — 94726 PLETHYSMOGRAPHY LUNG VOLUMES: CPT | Performed by: INTERNAL MEDICINE

## 2023-01-24 PROCEDURE — 94060 EVALUATION OF WHEEZING: CPT | Performed by: INTERNAL MEDICINE

## 2023-01-24 PROCEDURE — 85025 COMPLETE CBC W/AUTO DIFF WBC: CPT | Performed by: INTERNAL MEDICINE

## 2023-01-24 PROCEDURE — 99214 OFFICE O/P EST MOD 30 MIN: CPT | Performed by: INTERNAL MEDICINE

## 2023-01-24 PROCEDURE — 87186 SC STD MICRODIL/AGAR DIL: CPT | Performed by: INTERNAL MEDICINE

## 2023-01-24 PROCEDURE — 81003 URINALYSIS AUTO W/O SCOPE: CPT | Performed by: INTERNAL MEDICINE

## 2023-01-24 PROCEDURE — G0439 PPPS, SUBSEQ VISIT: HCPCS | Performed by: INTERNAL MEDICINE

## 2023-01-24 PROCEDURE — 87077 CULTURE AEROBIC IDENTIFY: CPT | Performed by: INTERNAL MEDICINE

## 2023-01-24 PROCEDURE — 80061 LIPID PANEL: CPT | Performed by: INTERNAL MEDICINE

## 2023-01-24 PROCEDURE — 87086 URINE CULTURE/COLONY COUNT: CPT | Performed by: INTERNAL MEDICINE

## 2023-01-24 PROCEDURE — 83036 HEMOGLOBIN GLYCOSYLATED A1C: CPT | Performed by: INTERNAL MEDICINE

## 2023-01-24 PROCEDURE — G0103 PSA SCREENING: HCPCS | Performed by: INTERNAL MEDICINE

## 2023-01-24 PROCEDURE — 80053 COMPREHEN METABOLIC PANEL: CPT | Performed by: INTERNAL MEDICINE

## 2023-01-24 PROCEDURE — 99397 PER PM REEVAL EST PAT 65+ YR: CPT | Performed by: INTERNAL MEDICINE

## 2023-01-24 PROCEDURE — 94729 DIFFUSING CAPACITY: CPT | Performed by: INTERNAL MEDICINE

## 2023-01-24 PROCEDURE — 1170F FXNL STATUS ASSESSED: CPT | Performed by: INTERNAL MEDICINE

## 2023-01-24 PROCEDURE — 81001 URINALYSIS AUTO W/SCOPE: CPT | Performed by: INTERNAL MEDICINE

## 2023-01-24 RX ORDER — TIOTROPIUM BROMIDE INHALATION SPRAY 1.56 UG/1
2 SPRAY, METERED RESPIRATORY (INHALATION)
COMMUNITY
End: 2023-02-27 | Stop reason: SDUPTHER

## 2023-01-24 RX ORDER — FUROSEMIDE 20 MG/1
20 TABLET ORAL 2 TIMES DAILY
Qty: 30 TABLET | Refills: 2 | Status: SHIPPED | OUTPATIENT
Start: 2023-01-24

## 2023-01-24 RX ORDER — ALBUTEROL SULFATE 90 UG/1
4 AEROSOL, METERED RESPIRATORY (INHALATION) ONCE
Status: SHIPPED | OUTPATIENT
Start: 2023-01-24

## 2023-01-24 RX ORDER — ALBUTEROL SULFATE 90 UG/1
2 AEROSOL, METERED RESPIRATORY (INHALATION) EVERY 4 HOURS PRN
Qty: 6.7 G | Refills: 11 | Status: SHIPPED | OUTPATIENT
Start: 2023-01-24 | End: 2023-03-20 | Stop reason: SDUPTHER

## 2023-01-24 RX ORDER — POTASSIUM CHLORIDE 20 MEQ/1
20 TABLET, EXTENDED RELEASE ORAL DAILY
Qty: 30 TABLET | Refills: 2 | Status: SHIPPED | OUTPATIENT
Start: 2023-01-24 | End: 2023-02-27 | Stop reason: SDUPTHER

## 2023-01-24 NOTE — PROGRESS NOTES
The ABCs of the Annual Wellness Visit  Subsequent Medicare Wellness Visit    Subjective      Darien Camarena is a 87 y.o. male who presents for a Subsequent Medicare Wellness Visit.    The following portions of the patient's history were reviewed and   updated as appropriate: allergies, current medications, past family history, past medical history, past social history, past surgical history and problem list.    Compared to one year ago, the patient feels his physical   health is the same.    Compared to one year ago, the patient feels his mental   health is the same.    Recent Hospitalizations:  This patient has had a [facility] admission record on file within the last 365 days.    Current Medical Providers:  Patient Care Team:  Pito Way MD as PCP - General  Pito Way MD as PCP - Family Medicine  Titus Oliveros IV, MD as Consulting Physician (Cardiology)  Blanquita Ansari APRN as Nurse Practitioner (Interventional Cardiology)  Nickolas Rios MD as Consulting Physician (Urology)  Vikram Eagle MD as Consulting Physician (Pulmonary Disease)    Outpatient Medications Prior to Visit   Medication Sig Dispense Refill   • albuterol sulfate  (90 Base) MCG/ACT inhaler Inhale 2 puffs Every 4 (Four) Hours As Needed for Wheezing. 6.7 g 11   • allopurinol (ZYLOPRIM) 300 MG tablet Take 1 tablet by mouth Daily. 90 tablet 1   • amiodarone (PACERONE) 200 MG tablet Take 0.5 tablets by mouth Daily. 90 tablet 1   • apixaban (Eliquis) 5 MG tablet tablet Take 1 tablet by mouth Every 12 (Twelve) Hours. 180 tablet 1   • Ascorbic Acid (VITAMIN C) 500 MG capsule Take 1 tablet by mouth 4 (four) times a day.     • docusate sodium (COLACE) 100 MG capsule Take 300 mg by mouth every night.     • Ferrous Gluconate (IRON) 240 (27 FE) MG tablet Take 1 tablet by mouth daily.     • finasteride (PROSCAR) 5 MG tablet Take 1 tablet by mouth every night at bedtime. 90 tablet 1   •  hydroCHLOROthiazide (MICROZIDE) 12.5 MG capsule Take 1 capsule by mouth once daily in the morning 90 capsule 0   • metFORMIN (GLUCOPHAGE) 1000 MG tablet Take 1 tablet by mouth 2 (Two) Times a Day. 180 tablet 1   • metoprolol tartrate (LOPRESSOR) 100 MG tablet Take 1 tablet by mouth Every 12 (Twelve) Hours. 180 tablet 0   • omeprazole (priLOSEC) 20 MG capsule Take 1 capsule by mouth Daily. 90 capsule 1   • pravastatin (PRAVACHOL) 40 MG tablet Take 1 tablet by mouth Daily. 90 tablet 1   • Probiotic Product (PROBIOTIC ADVANCED PO) Take 1 tablet by mouth 2 (Two) Times a Day.     • sertraline (Zoloft) 50 MG tablet Take 1 tablet by mouth Daily. 90 tablet 0   • tamsulosin (FLOMAX) 0.4 MG capsule 24 hr capsule Take 1 capsule by mouth Daily. 90 capsule 1   • Tiotropium Bromide Monohydrate (Spiriva Respimat) 1.25 MCG/ACT aerosol solution inhaler Inhale 2 puffs Daily.     • valsartan (DIOVAN) 80 MG tablet Take 1 tablet by mouth Daily. 90 tablet 1   • amLODIPine (NORVASC) 10 MG tablet TAKE 1 TABLET EVERY DAY 90 tablet 1     Facility-Administered Medications Prior to Visit   Medication Dose Route Frequency Provider Last Rate Last Admin   • albuterol sulfate HFA (PROVENTIL HFA;VENTOLIN HFA;PROAIR HFA) inhaler 4 puff  4 puff Inhalation Once Vikram Eagle MD           No opioid medication identified on active medication list. I have reviewed chart for other potential  high risk medication/s and harmful drug interactions in the elderly.          Aspirin is not on active medication list.  Aspirin use is not indicated based on review of current medical condition/s. Risk of harm outweighs potential benefits.  .    Patient Active Problem List   Diagnosis   • Atopic rhinitis   • Benign (familial) paraproteinemia   • Type 2 diabetes mellitus with stage 3 chronic kidney disease, without long-term current use of insulin (HCC)   • Enlarged prostate without lower urinary tract symptoms (luts)   • Gastroesophageal reflux disease with  "esophagitis   • Polyp of gallbladder   • Gout   • Liver cyst   • Hyperlipidemia LDL goal <100   • Essential hypertension   • Nephrolithiasis   • Obstructive sleep apnea syndrome   • Paroxysmal atrial fibrillation (HCC)   • Stage 3 chronic kidney disease (HCC)   • Long term current use of antiarrhythmic medical therapy   • Hydronephrosis   • Coronary artery disease involving native coronary artery of native heart with angina pectoris (HCC)   • Acute respiratory failure with hypoxia (HCC)   • Influenza A   • Hypertensive urgency   • Type 2 diabetes mellitus (HCC)     Advance Care Planning  Advance Directive is on file.  ACP discussion was held with the patient during this visit. Patient has an advance directive in EMR which is still valid.      Objective    Vitals:    23 1617   BP: 168/82   BP Location: Right arm   Patient Position: Sitting   Cuff Size: Adult   Pulse: 64   Resp: 18   Temp: 98.2 °F (36.8 °C)   TempSrc: Infrared   Weight: 115 kg (253 lb)   Height: 183.5 cm (72.25\")   PainSc: 0-No pain     Estimated body mass index is 34.08 kg/m² as calculated from the following:    Height as of this encounter: 183.5 cm (72.25\").    Weight as of this encounter: 115 kg (253 lb).    BMI is >= 30 and <35. (Class 1 Obesity). The following options were offered after discussion;: weight loss educational material (shared in after visit summary), exercise counseling/recommendations and nutrition counseling/recommendations    Finger Rub Hearing{Test (right ear):passed  Finger Rub Hearing{Test (left ear):passed      Does the patient have evidence of cognitive impairment?   No    Lab Results   Component Value Date    HGBA1C 5.10 2022          HEALTH RISK ASSESSMENT    Smoking Status:  Social History     Tobacco Use   Smoking Status Former   • Types: Cigarettes   • Quit date: 1960   • Years since quittin.7   Smokeless Tobacco Former   • Types: Chew   • Quit date:    Tobacco Comments    started at 15 yo. "     Alcohol Consumption:  Social History     Substance and Sexual Activity   Alcohol Use No     Fall Risk Screen:    STEADI Fall Risk Assessment was completed, and patient is at MODERATE risk for falls. Assessment completed on:1/24/2023    Depression Screening:  PHQ-2/PHQ-9 Depression Screening 1/24/2023   Little Interest or Pleasure in Doing Things 0-->not at all   Feeling Down, Depressed or Hopeless 0-->not at all   PHQ-9: Brief Depression Severity Measure Score 0       Health Habits and Functional and Cognitive Screening:  Functional & Cognitive Status 1/24/2023   Do you have difficulty preparing food and eating? Yes   Do you have difficulty bathing yourself, getting dressed or grooming yourself? No   Do you have difficulty using the toilet? No   Do you have difficulty moving around from place to place? No   Do you have trouble with steps or getting out of a bed or a chair? No   Current Diet Well Balanced Diet   Dental Exam Not up to date   Eye Exam Not up to date   Exercise (times per week) 0 times per week   Current Exercises Include No Regular Exercise   Current Exercise Activities Include -   Do you need help using the phone?  No   Are you deaf or do you have serious difficulty hearing?  Yes   Do you need help with transportation? No   Do you need help shopping? No   Do you need help preparing meals?  No   Do you need help with housework?  No   Do you need help with laundry? No   Do you need help taking your medications? Yes   Do you need help managing money? No   Do you ever drive or ride in a car without wearing a seat belt? Yes   Have you felt unusual stress, anger or loneliness in the last month? Yes   Who do you live with? Alone   If you need help, do you have trouble finding someone available to you? No   Have you been bothered in the last four weeks by sexual problems? No   Do you have difficulty concentrating, remembering or making decisions? Yes       Age-appropriate Screening Schedule:  Refer to the  list below for future screening recommendations based on patient's age, sex and/or medical conditions. Orders for these recommended tests are listed in the plan section. The patient has been provided with a written plan.    Health Maintenance   Topic Date Due   • DIABETIC EYE EXAM  05/01/2018   • URINE MICROALBUMIN  04/17/2020   • ZOSTER VACCINE (2 of 2) 11/20/2021   • HEMOGLOBIN A1C  06/13/2023   • LIPID PANEL  09/22/2023   • TDAP/TD VACCINES (4 - Td or Tdap) 06/14/2028   • INFLUENZA VACCINE  Completed                CMS Preventative Services Quick Reference  Risk Factors Identified During Encounter:    Immunizations Discussed/Encouraged: Shingrix    The above risks/problems have been discussed with the patient.  Pertinent information has been shared with the patient in the After Visit Summary.    Diagnoses and all orders for this visit:    1. Medicare annual wellness visit, subsequent (Primary)    2. Physical exam    3. Type 2 diabetes mellitus without complication, without long-term current use of insulin (HCC)  -     Comprehensive Metabolic Panel; Future  -     Hemoglobin A1c; Future    4. Essential hypertension  -     furosemide (Lasix) 20 MG tablet; Take 1 tablet by mouth 2 (Two) Times a Day.  Dispense: 30 tablet; Refill: 2  -     potassium chloride (K-DUR,KLOR-CON) 20 MEQ CR tablet; Take 1 tablet by mouth Daily.  Dispense: 30 tablet; Refill: 2  -     CBC & Differential; Future  -     Comprehensive Metabolic Panel; Future  -     TSH; Future  -     POC Urinalysis Dipstick, Automated; Future    5. Paroxysmal atrial fibrillation (HCC)  -     CBC & Differential; Future  -     Comprehensive Metabolic Panel; Future    6. Hyperlipidemia LDL goal <100  -     Comprehensive Metabolic Panel; Future  -     Lipid Panel; Future    7. Reactive depression    8. Gastroesophageal reflux disease with esophagitis without hemorrhage    9. Prostate cancer screening  -     PSA Screen; Future        Follow Up:   Next Medicare  Wellness visit to be scheduled in 1 year.      An After Visit Summary and PPPS were made available to the patient.

## 2023-01-24 NOTE — PROGRESS NOTES
Chief Complaint   Patient presents with   • Medicare Wellness-subsequent       History of Present Illness    The patient presents for an established patient physical examination and health maintenance visit.    The patient presents for a follow-up related to Type 2 Diabetes Mellitus. He does not check his blood sugars at home. He denies hypoglycemic symptoms. The patient denies polyuria, polydypsia or polyphagia. He reports no symptoms of neuropathy. The patient takes his medication as prescribed. He is not taking insulin. The patient does check his feet daily at home. He denies chest pain, shortness of breath, orthopnea, paroxysmal nocturnal dyspnea, dyspnea on exertion, edema, palpitations or syncope.    The patient presents for a follow-up related to hypertension. The patient reports that he has had no headaches or blurred vision. He states that he is taking his medication as prescribed. He is not having medication side effects. He checks his blood pressures at home. The home readings are elevated.    The patient presents for a follow-up related to chronic atrial fibrillation. He denies palpitations. The patient denies fatigue, dizziness, nausea or exercise intolerance. The patient denies using diet pills, decongestants, alcohol, caffeine, cocaine or stimulant medications.    The patient presents for a follow-up related to depression. He denies currently having depression symptoms. He denies suicidal ideation. His energy level is good. He denies agorophobia. He sleeps well. He is not tearful. He states that his current depression symptoms are stable. He is currently taking a medication. The current medication regimen consists of sertraline. The patient denies having medication side effects including nausea, headaches, anxiety, increased depression, anorgasmia or fatigue.    The patient presents for a follow-up related to GERD. The patient is on omeprazole for his gastroesophageal reflux. The medication is taken  on a regular basis and gives complete relief of the symptoms. He reports no abdominal pain, belching, diarrhea, dysphagia, early satiety, heartburn, hoarseness, odynophagia, rectal bleeding, vomiting or weight loss. The GERD has no known aggravating factors.    The patient presents for a follow-up related to hyperlipidemia. He is following a low fat diet. He reports that he is exercising. He is taking pravastatin. The patient is taking his medication as prescribed. He reports no medication side effects, including muscle cramps, abdominal pain, headaches or weakness.    Medications      Current Outpatient Medications:   •  albuterol sulfate  (90 Base) MCG/ACT inhaler, Inhale 2 puffs Every 4 (Four) Hours As Needed for Wheezing., Disp: 6.7 g, Rfl: 11  •  allopurinol (ZYLOPRIM) 300 MG tablet, Take 1 tablet by mouth Daily., Disp: 90 tablet, Rfl: 1  •  amiodarone (PACERONE) 200 MG tablet, Take 0.5 tablets by mouth Daily., Disp: 90 tablet, Rfl: 1  •  apixaban (Eliquis) 5 MG tablet tablet, Take 1 tablet by mouth Every 12 (Twelve) Hours., Disp: 180 tablet, Rfl: 1  •  Ascorbic Acid (VITAMIN C) 500 MG capsule, Take 1 tablet by mouth 4 (four) times a day., Disp: , Rfl:   •  docusate sodium (COLACE) 100 MG capsule, Take 300 mg by mouth every night., Disp: , Rfl:   •  Ferrous Gluconate (IRON) 240 (27 FE) MG tablet, Take 1 tablet by mouth daily., Disp: , Rfl:   •  finasteride (PROSCAR) 5 MG tablet, Take 1 tablet by mouth every night at bedtime., Disp: 90 tablet, Rfl: 1  •  hydroCHLOROthiazide (MICROZIDE) 12.5 MG capsule, Take 1 capsule by mouth once daily in the morning, Disp: 90 capsule, Rfl: 0  •  metFORMIN (GLUCOPHAGE) 1000 MG tablet, Take 1 tablet by mouth 2 (Two) Times a Day., Disp: 180 tablet, Rfl: 1  •  metoprolol tartrate (LOPRESSOR) 100 MG tablet, Take 1 tablet by mouth Every 12 (Twelve) Hours., Disp: 180 tablet, Rfl: 0  •  omeprazole (priLOSEC) 20 MG capsule, Take 1 capsule by mouth Daily., Disp: 90 capsule, Rfl:  1  •  pravastatin (PRAVACHOL) 40 MG tablet, Take 1 tablet by mouth Daily., Disp: 90 tablet, Rfl: 1  •  Probiotic Product (PROBIOTIC ADVANCED PO), Take 1 tablet by mouth 2 (Two) Times a Day., Disp: , Rfl:   •  sertraline (Zoloft) 50 MG tablet, Take 1 tablet by mouth Daily., Disp: 90 tablet, Rfl: 0  •  tamsulosin (FLOMAX) 0.4 MG capsule 24 hr capsule, Take 1 capsule by mouth Daily., Disp: 90 capsule, Rfl: 1  •  Tiotropium Bromide Monohydrate (Spiriva Respimat) 1.25 MCG/ACT aerosol solution inhaler, Inhale 2 puffs Daily., Disp: , Rfl:   •  valsartan (DIOVAN) 80 MG tablet, Take 1 tablet by mouth Daily., Disp: 90 tablet, Rfl: 1    Current Facility-Administered Medications:   •  albuterol sulfate HFA (PROVENTIL HFA;VENTOLIN HFA;PROAIR HFA) inhaler 4 puff, 4 puff, Inhalation, Once, Vikram Eagle MD     Allergies    Allergies   Allergen Reactions   • Xarelto [Rivaroxaban] GI Bleeding     GI bleed   • Penicillins Hives     Has tolerated cefepime, ceftriaxone       Problem List    Patient Active Problem List   Diagnosis   • Atopic rhinitis   • Benign (familial) paraproteinemia   • Type 2 diabetes mellitus with stage 3 chronic kidney disease, without long-term current use of insulin (HCC)   • Enlarged prostate without lower urinary tract symptoms (luts)   • Gastroesophageal reflux disease with esophagitis   • Polyp of gallbladder   • Gout   • Liver cyst   • Hyperlipidemia LDL goal <100   • Essential hypertension   • Nephrolithiasis   • Obstructive sleep apnea syndrome   • Paroxysmal atrial fibrillation (HCC)   • Stage 3 chronic kidney disease (HCC)   • Long term current use of antiarrhythmic medical therapy   • Hydronephrosis   • Coronary artery disease involving native coronary artery of native heart with angina pectoris (HCC)   • Acute respiratory failure with hypoxia (HCC)   • Influenza A   • Hypertensive urgency   • Type 2 diabetes mellitus (HCC)       Medications, Allergies, Problems List and Past History were reviewed  "and updated.    Physical Examination    /82 (BP Location: Right arm, Patient Position: Sitting, Cuff Size: Adult)   Pulse 64   Temp 98.2 °F (36.8 °C) (Infrared)   Resp 18   Ht 183.5 cm (72.25\")   Wt 115 kg (253 lb)   BMI 34.08 kg/m²       HEENT: Head- Normocephalic Atraumatic. Facies- Within normal limits. Pinnas- Normal texture and shape bilaterally. Canals- Normal bilaterally. TMs- Normal bilaterally. Nares- Patent bilaterally. Nasal Septum- is normal. There is no tenderness to palpation over the frontal or maxillary sinuses. Lids- Normal bilaterally. Conjunctiva- Clear bilaterally. Sclera- Anicteric bilaterally. Oropharynx- Moist with no lesions. Tonsils- No enlargement, erythema or exudate.    Neck: Thyroid- non enlarged, symmetric and has no nodules. No bruits are detected. ROM- Normal Range of Motion with no rigidity.    Lungs: Auscultation- Clear to auscultation bilaterally. There are no retractions, clubbing or cyanosis. The Expiratory to Inspiratory ratio is equal.    Lymph Nodes: Cervical- no enlarged lymph nodes noted. Clavicular- Deferred. Axillary- Deferred. Inguinal- Deferred.    Cardiovascular: There are no carotid bruits. Heart- Normal Rate with Irregularly Irregular rhythm and no murmurs. There are no gallops. There are no rubs. In the lower extremities there is bilateral 1+ pitting edema to the level of the mid calfs. The upper extremities do not have edema.    Abdomen: Soft, benign, non-tender with no masses, hernias, organomegaly or scars.    Dermatologic: The patient has no worrisome or suspicious skin lesions noted.    Impression and Assessment    Normal Physical Examination.    Encounter for Screening for Malignant Neoplasm of the Prostate.    Type 2 Diabetes Mellitus.    Essential Hypertension.    Atrial Fibrillation.    Reactive Depression.    Gastroesophageal Reflux Disease.    Hyperlipidemia.    Plan    Gastroesophageal Reflux Disease Plan: The patient was instructed to " continue the current medications.    Essential Hypertension Plan: The patient was instructed to continue the current medications.    Atrial Fibrillation Plan: The patient was instructed to continue the current medications.    Hyperlipidemia Plan: The patient was instructed to exercise daily, eat a low fat diet and continue his medications.    Type 2 Diabetes Mellitus Plan: The patient was instructed to continue the current medications.    Reactive Depression Plan: Medication will be added as noted.    Counseling was provided regarding: Adequate Aerobic Exercise, Dental Visits, Flossing Teeth, Heart Healthy Diet and Seat Belt Utilization.    The following was ordered for screening and health maintenance: PSA.       Immunizations Ordered and Administered: None.    Diagnoses and all orders for this visit:    1. Medicare annual wellness visit, subsequent (Primary)    2. Physical exam    3. Type 2 diabetes mellitus without complication, without long-term current use of insulin (HCC)  -     Comprehensive Metabolic Panel; Future  -     Hemoglobin A1c; Future  -     Comprehensive Metabolic Panel  -     Hemoglobin A1c    4. Essential hypertension  -     furosemide (Lasix) 20 MG tablet; Take 1 tablet by mouth 2 (Two) Times a Day.  Dispense: 30 tablet; Refill: 2  -     potassium chloride (K-DUR,KLOR-CON) 20 MEQ CR tablet; Take 1 tablet by mouth Daily.  Dispense: 30 tablet; Refill: 2  -     CBC & Differential; Future  -     Comprehensive Metabolic Panel; Future  -     TSH; Future  -     POC Urinalysis Dipstick, Automated; Future  -     CBC & Differential  -     Comprehensive Metabolic Panel  -     TSH  -     POC Urinalysis Dipstick, Automated    5. Paroxysmal atrial fibrillation (HCC)  -     CBC & Differential; Future  -     Comprehensive Metabolic Panel; Future  -     CBC & Differential  -     Comprehensive Metabolic Panel    6. Hyperlipidemia LDL goal <100  -     Comprehensive Metabolic Panel; Future  -     Lipid Panel;  Future  -     Comprehensive Metabolic Panel  -     Lipid Panel    7. Reactive depression    8. Gastroesophageal reflux disease with esophagitis without hemorrhage    9. Prostate cancer screening  -     PSA Screen; Future  -     PSA Screen    10. Hematuria, unspecified type  -     Urine Culture - Urine, Urine, Clean Catch; Future  -     Urinalysis With Microscopic - Urine, Clean Catch; Future  -     Urine Culture - Urine, Urine, Clean Catch  -     Urinalysis With Microscopic - Urine, Clean Catch      Medications Discontinued During This Encounter   Medication Reason   • amLODIPine (NORVASC) 10 MG tablet *Therapy completed   • hydroCHLOROthiazide (MICROZIDE) 12.5 MG capsule          Return to Office    The patient was instructed to return for follow-up in 2 weeks. The patient was instructed to return sooner if the condition changes, worsens, or does not resolve.

## 2023-01-24 NOTE — PATIENT INSTRUCTIONS
Medicare Wellness  Personal Prevention Plan of Service     Date of Office Visit:    Encounter Provider:  Pito Way MD  Place of Service:  [facility]  Patient Name: Darien Camarena  :  1935    As part of the Medicare Wellness portion of your visit today, we are providing you with this personalized preventive plan of services (PPPS). This plan is based upon recommendations of the [place] Preventive Services Task Force (USPSTF) and the Advisory Committee on Immunization Practices (ACIP).    This lists the preventive care services that should be considered, and provides dates of when you are due. Items listed as completed are up-to-date and do not require any further intervention.    Health Maintenance   Topic Date Due    DIABETIC EYE EXAM  2018    URINE MICROALBUMIN  2020    ANNUAL WELLNESS VISIT  2021    ZOSTER VACCINE (2 of 2) 2021    HEMOGLOBIN A1C  2023    LIPID PANEL  2023    TDAP/TD VACCINES (4 - Td or Tdap) 2028    COVID-19 Vaccine  Completed    INFLUENZA VACCINE  Completed    Pneumococcal Vaccine 65+  Completed       Orders Placed This Encounter   Procedures    Comprehensive Metabolic Panel     Standing Status:   Future     Standing Expiration Date:   2024     Order Specific Question:   Release to patient     Answer:   Routine Release    Hemoglobin A1c     Standing Status:   Future     Standing Expiration Date:   2024     Order Specific Question:   Release to patient     Answer:   Routine Release    Lipid Panel     Standing Status:   Future     Standing Expiration Date:   2024    TSH     Standing Status:   Future     Standing Expiration Date:   2024     Order Specific Question:   Release to patient     Answer:   Routine Release    PSA Screen     Standing Status:   Future     Standing Expiration Date:   2024     Order Specific Question:   Release to patient      Answer:   Routine Release    POC Urinalysis Dipstick, Automated     Standing Status:   Future    CBC & Differential     Standing Status:   Future     Standing Expiration Date:   1/25/2024     Order Specific Question:   Manual Differential     Answer:   No       Return in about 2 weeks (around 2/7/2023) for Follow-up.

## 2023-01-24 NOTE — PROGRESS NOTES
PULMONARY FOLLOW-UP OUTPATIENT NOTE:     Patient ID/Chief Complaint: .Darien Camarena is a 87 y.o. male presenting for follow up on lung nodule.     History of Present Illness: Patient Isidro is an 87 year old male with past medical history of hypertension, CAD, atrial fibrillation, HLD and STEVEN.  He was first evaluated in pulmonary clinic on 1/10/2023 following a recent hospitalization for acute hypoxic respiratory failure secondary to influenza A in December 2022.  Thoracic imaging during hospitalization revealed an incidental left lower lobe pulmonary nodule. This was followed up with a PET scan earlier this month (1/04/2023) demonstrating avidity.  After much deliberation on work-up and treatment of left lower lobe nodule (ie bronchoscopy, serial imaging, empiric radiation)--the decision was made to pursue potential SBRT.  Patient here today (1/24/2023) for further discussion and pulmonary function testing.  Over the last 2 weeks he notes increased shortness of breath during exertion.  He denies chest pain, cough, fever, chills, lymphadenopathy, night sweats, weight loss, nausea, vomiting, numbness/tingling in arms/neck, syncope and presyncope.  Review of systems is positive for worsening lower extremity edema bilaterally.    PFSH: Reviewed in EMR, Significant Changes Noted Above    Review of Systems: Fourteen point review of systems performed and negative except for those issues listed in HPI    Medications:  Current Outpatient Medications   Medication Sig Dispense Refill   • hydroCHLOROthiazide (MICROZIDE) 12.5 MG capsule Take 1 capsule by mouth once daily in the morning 90 capsule 0   • allopurinol (ZYLOPRIM) 300 MG tablet Take 1 tablet by mouth Daily. 90 tablet 1   • amiodarone (PACERONE) 200 MG tablet Take 0.5 tablets by mouth Daily. 90 tablet 1   • amLODIPine (NORVASC) 10 MG tablet TAKE 1 TABLET EVERY DAY 90 tablet 1   • apixaban (Eliquis) 5 MG tablet tablet Take 1 tablet by mouth Every 12 (Twelve)  Hours. 180 tablet 1   • Ascorbic Acid (VITAMIN C) 500 MG capsule Take 1 tablet by mouth 4 (four) times a day.     • docusate sodium (COLACE) 100 MG capsule Take 300 mg by mouth every night.     • Ferrous Gluconate (IRON) 240 (27 FE) MG tablet Take 1 tablet by mouth daily.     • finasteride (PROSCAR) 5 MG tablet Take 1 tablet by mouth every night at bedtime. 90 tablet 1   • metFORMIN (GLUCOPHAGE) 1000 MG tablet Take 1 tablet by mouth 2 (Two) Times a Day. 180 tablet 1   • metoprolol tartrate (LOPRESSOR) 100 MG tablet Take 1 tablet by mouth Every 12 (Twelve) Hours. 180 tablet 0   • omeprazole (priLOSEC) 20 MG capsule Take 1 capsule by mouth Daily. 90 capsule 1   • pravastatin (PRAVACHOL) 40 MG tablet Take 1 tablet by mouth Daily. 90 tablet 1   • Probiotic Product (PROBIOTIC ADVANCED PO) Take 1 tablet by mouth 2 (Two) Times a Day.     • sertraline (Zoloft) 50 MG tablet Take 1 tablet by mouth Daily. 90 tablet 0   • tamsulosin (FLOMAX) 0.4 MG capsule 24 hr capsule Take 1 capsule by mouth Daily. 90 capsule 1   • valsartan (DIOVAN) 80 MG tablet Take 1 tablet by mouth Daily. 90 tablet 1     No current facility-administered medications for this visit.     Immunizations:  Chart reviewed: immunizations are up to date and documented.  Immunization History   Administered Date(s) Administered   • COVID-19 (PFIZER) BIVALENT BOOSTER 12+YRS 12/22/2022   • COVID-19 (PFIZER) PURPLE CAP 01/26/2021, 02/19/2021   • Fluzone High Dose =>65 Years ([employer] ONLY) 10/01/2016, 09/18/2017, 09/15/2018, 10/12/2019, 09/18/2020   • Fluzone High-Dose 65+yrs 09/19/2020, 09/25/2021, 12/22/2022   • Hepatitis A 09/15/2018, 11/01/2018   • Pneumococcal Conjugate 13-Valent (PCV13) 10/26/2015   • Pneumococcal Polysaccharide (PPSV23) 06/24/2006, 09/18/2020   • Pneumococcal, Unspecified 11/30/2006   • Shingrix 09/25/2021   • Td 06/24/2005   • Tdap 06/14/2018       Physical Examination:  Vital Signs: Pulse 86, temperature 98.4 °F (36.9 °C), temperature source  "Infrared, resp. rate 16, height 190.5 cm (75\"), weight 116 kg (255 lb), SpO2 92 %.    General: The patient appears in no acute distress.  Alert, cooperative and interactive.   HEENT: NC/AT, PERRL, Normal nasal mucosa.  Neck: Trachea midline, No masses.  Lymphadenopathy: No palpable anterior cervical, submandibular, submental, or posterior cervical lymphadenopathy.  No palpable supra- or infraclavicular lymphadenopathy.   Chest: Clear to auscultation bilaterally, No wheezing, rhonchi, or rales. No end-expiratory wheeze with forced expiratory maneuvers.  Normal work of breathing. Equal chest rise.  Cardiac: Regular rhythm, normal rate, S1S2 auscultated. No murmurs, rubs or gallops.   Extremities: 2+ lower extremity edema. No clubbing or cyanosis.  Skin: No rashes, open wounds, or bruising.  Warm, dry, well-perfused.  Neuro: Motor power grossly intact bilaterally.  Sensation intact.  Speech fluid and fluent.  Thought process coherent.  Psych: Alert and oriented x 3, Mood stable.    Review of Data:  Laboratory Studies: I personally reviewed recent lab work in EMR    Radiology Results: I have personally reviewed and interpreted the images in EMR    PET (1/04/2023):  Radiology Impression:   The subsolid left lower lobe nodule is mildly hypermetabolic, highly concerning for primary malignancy. Tissue sampling is recommended. No enlarged or hypermetabolic hilar or mediastinal lymph nodes to suggest prabhu metastases at this time. No evidence   of distant metastatic disease.    PFT (1/24/2023): Personally reviewed/interpreted study.  There is severe airflow obstruction (FEV1 43% predicted).  There is a significant response in FVC to a one-time dose of beta agonist bronchodilators (14%). There is concomitant severe restriction.  The diffusing capacity, uncorrected for hemoglobin, is mildly reduced.    Assessment & Plan:     # LLL Pulmonary Nodule  # COPD [GOLD grade 3; Group B]    - Again discussed nodule with both patient and " daughter.  Personally reviewed recent CTA chest as well as PET scan.  Compared with prior CTA from June 2022 where opacities in the area of question make it difficult to appreciate presence of nodule. Nevertheless, CTA from 12/12/2022 shows a subsolid nodule in patient's left lower lobe with an increasing solid component.  Follow-up PET scan on 1/04/2023 with avidity.  Radiology concern for primary malignancy.  - Presented options to patient and family including: repeat imaging, bronchoscopic evaluation and empiric radiation.  After discussing risk/benefits of each choice--empiric radiation was selected. Patient/family confirms that emperic radiation is the option which they are most comfortable. Pending appointment with radiation oncology on 1/30/2023   - Started Spiriva and as needed albuterol (1/24/2023) based on PFT  - Has appointment with PCP this evening where daughter(s) plan to ask about reinitiating lasix given worsening LE edema over the last 1-2x weeks.  Patient not wheezing on exam nor does he have any infectious symptoms today.  Low likelihood that increase shortness of breath is due to COPD flare but did inform family that if the symptoms do not improve with initiation of inhalers and more aggressive diuresis-- we will be happy to see again in clinic.     -- Jose Eagle MD   Pulmonary/Critical Care    Approximately 40 minutes were spent reviewing pertinent medical/family/social history, performing physical exam, clinically evaluating, interpreting labs/imaging, ordering necessary studies (i.e. medication, tests, procedures) and counseling patient. This does not include time spent with patient by nursing or clerical staff.

## 2023-01-25 LAB
ALBUMIN SERPL-MCNC: 3.6 G/DL (ref 3.5–5.2)
ALBUMIN/GLOB SERPL: 1.1 G/DL
ALP SERPL-CCNC: 120 U/L (ref 39–117)
ALT SERPL W P-5'-P-CCNC: 16 U/L (ref 1–41)
ANION GAP SERPL CALCULATED.3IONS-SCNC: 7 MMOL/L (ref 5–15)
AST SERPL-CCNC: 18 U/L (ref 1–40)
BACTERIA UR QL AUTO: ABNORMAL /HPF
BILIRUB SERPL-MCNC: 0.7 MG/DL (ref 0–1.2)
BUN SERPL-MCNC: 17 MG/DL (ref 8–23)
BUN/CREAT SERPL: 18.7 (ref 7–25)
CALCIUM SPEC-SCNC: 9 MG/DL (ref 8.6–10.5)
CHLORIDE SERPL-SCNC: 101 MMOL/L (ref 98–107)
CHOLEST SERPL-MCNC: 87 MG/DL (ref 0–200)
CO2 SERPL-SCNC: 32 MMOL/L (ref 22–29)
CREAT SERPL-MCNC: 0.91 MG/DL (ref 0.76–1.27)
EGFRCR SERPLBLD CKD-EPI 2021: 82.1 ML/MIN/1.73
GLOBULIN UR ELPH-MCNC: 3.4 GM/DL
GLUCOSE SERPL-MCNC: 114 MG/DL (ref 65–99)
HBA1C MFR BLD: 5.3 % (ref 4.8–5.6)
HDLC SERPL-MCNC: 38 MG/DL (ref 40–60)
HYALINE CASTS UR QL AUTO: ABNORMAL /LPF
LDLC SERPL CALC-MCNC: 35 MG/DL (ref 0–100)
LDLC/HDLC SERPL: 0.98 {RATIO}
POTASSIUM SERPL-SCNC: 4.3 MMOL/L (ref 3.5–5.2)
PROT SERPL-MCNC: 7 G/DL (ref 6–8.5)
PSA SERPL-MCNC: 0.18 NG/ML (ref 0–4)
RBC # UR STRIP: ABNORMAL /HPF
REF LAB TEST METHOD: ABNORMAL
SODIUM SERPL-SCNC: 140 MMOL/L (ref 136–145)
SQUAMOUS #/AREA URNS HPF: ABNORMAL /HPF
TRIGL SERPL-MCNC: 59 MG/DL (ref 0–150)
TSH SERPL DL<=0.05 MIU/L-ACNC: 8.81 UIU/ML (ref 0.27–4.2)
VLDLC SERPL-MCNC: 14 MG/DL (ref 5–40)
WBC # UR STRIP: ABNORMAL /HPF

## 2023-01-26 ENCOUNTER — TELEPHONE (OUTPATIENT)
Dept: INTERNAL MEDICINE | Facility: CLINIC | Age: 88
End: 2023-01-26
Payer: MEDICARE

## 2023-01-26 DIAGNOSIS — N39.0 URINARY TRACT INFECTION DUE TO ESBL KLEBSIELLA: Primary | ICD-10-CM

## 2023-01-26 DIAGNOSIS — B96.89 URINARY TRACT INFECTION DUE TO ESBL KLEBSIELLA: Primary | ICD-10-CM

## 2023-01-26 LAB — BACTERIA SPEC AEROBE CULT: ABNORMAL

## 2023-01-26 NOTE — TELEPHONE ENCOUNTER
Spoke with patient daughter, Nikki, (on [facility]), informed that Dr Way said let him know that he has a multi-drug resistant UTI (Klebsiella bacteria) in his urine.  He needs to see an infectious disease specialist.  I have put in a referral.  Verbalized good understanding and agreement.  States patient has seen Dr Og with Infectious Disease and would like to be scheduled with him again.  Explained will let referral coordinator know.  Verbalized appreciation.

## 2023-01-26 NOTE — TELEPHONE ENCOUNTER
[facility] called, verified  and identity. Informed of critical lab results. Lab ordered and preformed.       Contains abnormal data Urine Culture - Urine, Urine, Clean Catch  Order: [id number]   Status: Final result      Visible to patient: No (scheduled for 2023 10:52 PM)      Next appt: 2023 at 10:30 AM in Radiation Oncology (Abbe Leary MD)      Dx: Hematuria, unspecified type     Specimen Information: Urine, Clean Catch    0 Result Notes  Urine Culture >100,000 CFU/mL Klebsiella pneumoniae ESBL Abnormal       Consider infectious disease consult.  Susceptibility results may not correlate to clinical outcomes.      Colonization of the urinary tract without infection is common. Treatment is discouraged unless the patient is symptomatic, pregnant, or undergoing an invasive urologic procedure.   Recent outcomes data supports the use of pip/tazo in the treatment of susceptible ESBL infections for uncomplicated UTI. Consider use of pip/tazo as a carbapenem-sparing regimen in applicable patients.        Resulting Agency: [facility]     Susceptibility     Klebsiella pneumoniae ESBL     JESSICA     Amikacin <=2 ug/ml Susceptible     Ertapenem <=0.5 ug/ml Susceptible     Gentamicin >=16 ug/ml Resistant     Levofloxacin 1 ug/ml Intermediate     Meropenem <=0.25 ug/ml Susceptible     Nitrofurantoin 64 ug/ml Intermediate     Piperacillin + Tazobactam 8 ug/ml Susceptible     Tobramycin >=16 ug/ml Resistant     Trimethoprim + Sulfamethoxazole >=320 ug/ml Resistant                 POC Urinalysis Dipstick, Automated  Order: [id number]   Status: Final result      Visible to patient: Yes (not seen)      Next appt: 2023 at 10:30 AM in Radiation Oncology (Abbe Leary MD)      Dx: Essential hypertension     Specimen Information: Urine    0 Result Notes            Component  Ref Range & Units 2 d ago  (23) 2 d ago  (23) 5 mo ago  (22) 7 mo ago  (22) 1 yr ago  (5/10/21) 2 yr ago  (21) 2 yr  ago  (12/22/20)   Color  Yellow, Straw, Dark Yellow, Anastasia Straw  Yellow R  Yellow R  Yellow R  Red Abnormal  R  Dark Yellow  Red Abnormal  R    Clarity, UA  Clear Slightly Cloudy Abnormal   Cloudy Abnormal   Cloudy Abnormal   Cloudy Abnormal   Turbid Abnormal   Cloudy Abnormal   Cloudy Abnormal     Specific Gravity   1.005 - 1.030 1.010  1.014  1.020 R  1.016 R  1.015  1.015  1.017 R    pH, Urine  5.0 - 8.0 6.0  6.5  <=5.0  5.5  7.5  5.0  <=5.0    Leukocytes  Negative 500 Germaine/ul Abnormal   Moderate (2+) Abnormal   Moderate (2+) Abnormal   Large (3+) Abnormal   Large (3+) Abnormal   500 Germaine/ul Abnormal   Moderate (2+) Abnormal     Nitrite, UA  Negative Positive Abnormal   Negative  Negative  Negative  Positive Abnormal   Negative  Positive Abnormal     Protein, POC  Negative mg/dL 30 mg/dL Abnormal   30 mg/dL (1+) Abnormal  R  100 mg/dL (2+) Abnormal  R  100 mg/dL (2+) Abnormal  R  >=300 mg/dL (3+) Abnormal  R  100 mg/dL Abnormal   100 mg/dL (2+) Abnormal  R    Glucose, UA  Negative mg/dL Negative  Negative R  Negative R  Negative R  250 mg/dL (1+) Abnormal  R  Negative R  Negative R    Ketones, UA  Negative Negative  Negative  Negative  Negative  40 mg/dL (2+) Abnormal   Negative  Negative    Urobilinogen, UA  Normal, 0.2 E.U./dL 1 E.U./dL Abnormal   1.0 E.U./dL R  1.0 E.U./dL R  0.2 E.U./dL R  >=8.0 E.U./dL Abnormal  R  4 E.U./dL Abnormal  R  0.2 E.U./dL R    Bilirubin  Negative Negative  Negative  Negative  Negative  Large (3+) Abnormal   Negative  Negative    Blood, UA  Negative 50 Meet/ul Abnormal   Negative  Trace Abnormal   Moderate (2+) Abnormal   Large (3+) Abnormal   250 Meet/ul Abnormal   Large (3+) Abnormal     Lot Number [id number]      [id number]     Expiration Date 02/29/2024

## 2023-01-26 NOTE — TELEPHONE ENCOUNTER
Please let him know that he has a multi-drug resistant UTI (Klebsiella bacteria in his urine.  He needs to see an infectious disease specialist.  I have put in a referral.    Forward this message to Maria D once he is notified.    Pito Way MD  16:56 EST  01/26/23

## 2023-01-27 ENCOUNTER — TELEPHONE (OUTPATIENT)
Dept: RADIATION ONCOLOGY | Facility: HOSPITAL | Age: 88
End: 2023-01-27
Payer: MEDICARE

## 2023-01-30 ENCOUNTER — OFFICE VISIT (OUTPATIENT)
Dept: RADIATION ONCOLOGY | Facility: HOSPITAL | Age: 88
End: 2023-01-30
Payer: MEDICARE

## 2023-01-30 ENCOUNTER — HOSPITAL ENCOUNTER (OUTPATIENT)
Dept: RADIATION ONCOLOGY | Facility: HOSPITAL | Age: 88
Setting detail: RADIATION/ONCOLOGY SERIES
Discharge: HOME | End: 2023-01-30
Payer: MEDICARE

## 2023-01-30 VITALS
SYSTOLIC BLOOD PRESSURE: 143 MMHG | RESPIRATION RATE: 16 BRPM | DIASTOLIC BLOOD PRESSURE: 73 MMHG | OXYGEN SATURATION: 92 % | TEMPERATURE: 97.3 F | HEART RATE: 56 BPM | HEIGHT: 75 IN | WEIGHT: 242 LBS | BODY MASS INDEX: 30.09 KG/M2

## 2023-01-30 DIAGNOSIS — C34.32 MALIGNANT NEOPLASM OF LOWER LOBE OF LEFT LUNG: Primary | ICD-10-CM

## 2023-01-30 PROCEDURE — G0463 HOSPITAL OUTPT CLINIC VISIT: HCPCS

## 2023-01-30 NOTE — PROGRESS NOTES
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 Eagle    cT1b N0 M0         BRIEF HISTORY:  Darien Camarena  is a very pleasant 87 y.o. male  with a remote history of smoking and A. fib on Eliquis who was incidentally found to have a left lower lobe pulmonary nodule.  The patient underwent reevaluation with a PET scan that showed the lesion was hypermetabolic.  The patient discussed options with Dr. Eagle considering biopsy and fiducial marker placement versus proceeding directly to SBRT.  The patient is most interested in proceeding directly to SBRT given his history and the appearance of this lesion.    Patient presents today to discuss options.    Patient denies any recent weight loss, fevers, chills.  He denies any hemoptysis.      BMI:  Body mass index is 30.25 kg/m².      Social History     Substance and Sexual Activity   Alcohol Use No       Allergies   Allergen Reactions   • Xarelto [Rivaroxaban] GI Bleeding     GI bleed   • Penicillins Hives     Has tolerated cefepime, ceftriaxone       Social History     Tobacco Use   • Smoking status: Former     Types: Cigarettes     Quit date: 1960     Years since quittin.7   • Smokeless tobacco: Former     Types: Chew     Quit date:    • Tobacco comments:     started at 13 yo.   Vaping Use   • Vaping Use: Never used   Substance Use Topics   • Alcohol use: No   • Drug use: Never         Past Medical History:   Diagnosis Date   • Arrhythmia    • Atrial fibrillation (HCC)    • Chronic kidney disease    • Diabetes mellitus (HCC)    • E. coli sepsis (HCC)  "06/23/2022   • GERD (gastroesophageal reflux disease)    • Gout    • History of colonoscopy 09/12/2012   • Hyperlipidemia    • Hypertension    • Lung cancer (HCC)    • STEVEN on CPAP    • PAF (paroxysmal atrial fibrillation) (HCC)    • Prostatism    • Sleep apnea     CPAP HS       family history includes Alzheimer's disease in his mother; COPD in his father; Lung cancer in his father; No Known Problems in his daughter, daughter, and daughter.     Past Surgical History:   Procedure Laterality Date   • CARDIAC CATHETERIZATION Left 9/29/2022    Procedure: Left Heart Cath;  Surgeon: Titus Oliveros IV, MD;  Location: [facility] CATH INVASIVE LOC[facility];  Service: Cardiovascular;  Laterality: Left;   • CARDIOVERSION     • CATARACT EXTRACTION Left    • KIDNEY STONE SURGERY          Review of Systems   Respiratory: Positive for shortness of breath.     A full 14 point review of systems was performed and was negative except as noted in the HPI.         Objective   VITAL SIGNS:   Vitals:    01/30/23 1013   BP: 143/73   Pulse: 56   Resp: 16   Temp: 97.3 °F (36.3 °C)   SpO2: 92%  Comment: RA   Weight: 110 kg (242 lb)   Height: 190.5 cm (75\")   PainSc: 0-No pain        KPS       80%    Physical Exam  Constitutional:       General: He is not in acute distress.     Appearance: He is well-developed.   HENT:      Head: Normocephalic and atraumatic.   Eyes:      Conjunctiva/sclera: Conjunctivae normal.      Pupils: Pupils are equal, round, and reactive to light.   Neck:      Trachea: No tracheal deviation.   Pulmonary:      Effort: Pulmonary effort is normal. No respiratory distress.      Breath sounds: No wheezing.   Abdominal:      General: There is no distension.      Palpations: Abdomen is soft.   Musculoskeletal:         General: Normal range of motion.      Cervical back: Normal range of motion.   Skin:     General: Skin is warm and dry.   Neurological:      Mental Status: He is alert.      Cranial Nerves: No cranial nerve " deficit.      Coordination: Coordination normal.   Psychiatric:         Behavior: Behavior normal.         Judgment: Judgment normal.              The following portions of the patient's history were reviewed and updated as appropriate: allergies, current medications, past family history, past medical history, past social history, past surgical history and problem list.  I have personally requested reviewed and interpreted the patient's images and radiology reports and pathology reports listed below:  I reviewed the patient's previous CT scans of chest.  I reviewed Dr. Eagle's most recent note.  I read the patient's PET scan images personally.  Assessment      IMPRESSION:     Patient is a very pleasant 87-year-old gentleman with a left lower lobe lesion highly concerning for primary bronchogenic carcinoma.  We discussed that without pathology is impossible to know exactly the histology or whether or not this lesion represents cancer or perhaps another entity.  They previously discussed the percentage yield of a biopsy and the complications with Dr. Eagle.  They were not terribly interested in a biopsy and were interested in treatment.  Given the patient's history and the appearance of this lesion on CT scan as well as its hypermetabolic activity on PET scan, it seems reasonable to assume that this represents a malignancy.  We discussed radiotherapy options for treatment.  Most recent one for him would be SBRT in 5 fractions.  We discussed different methods for planning and treatment.  We discussed that the patient will need to return for a planning session where we would evaluate his tumor motion.  We discussed that if the tumor excursion exceeds our abilities to plan safely then we may have to pivot to the placement of fiducial markers and a CyberKnife treatment versus Radixact.  The patient's family were most interested in proceeding without fiducial markers if possible.    Patient made return for CT  simulation.    Recommend dose of 45-50 Gray in 5 fractions with a heterogeneous SBRT plan.  Recommend IGR T for daily image guidance.    We discussed potential risk and benefits.  We discussed the risk of pneumonitis and scarring in the lungs potentially causing a dry cough requiring additional treatment.  Otherwise this lesion is in fairly good location for CyberKnife treatments with no proximity to the heart esophagus or chest wall.  The risk of damage to any structures should be quite low, less than 2%.    The patient is on blood thinners which sometimes can lead to some light, usually self-limited, hemoptysis.    Greater than 1 hour was spent preparing for and coordinating this visit. >50% of the time was spent in direct face to face conversation with the patient teaching, answering question, and providing explanations regarding the patient's case.  The decision to treat the patient with radiation is a complex one and carries the risk of long-term side effects and complications.  The patient's malignancy represents a complicated life threatening condition that requires complex multidisciplinary management for treatment and followup.      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 Fco Leary MD        Errors in dictation may reflect use of voice recognition software and not all errors in transcription may have been detected prior to signing.

## 2023-01-31 ENCOUNTER — TELEPHONE (OUTPATIENT)
Dept: INTERNAL MEDICINE | Facility: CLINIC | Age: 88
End: 2023-01-31
Payer: MEDICARE

## 2023-02-01 ENCOUNTER — HOSPITAL ENCOUNTER (OUTPATIENT)
Dept: RADIATION ONCOLOGY | Facility: HOSPITAL | Age: 88
Setting detail: RADIATION/ONCOLOGY SERIES
Discharge: HOME | End: 2023-02-01
Payer: MEDICARE

## 2023-02-01 ENCOUNTER — HOSPITAL ENCOUNTER (OUTPATIENT)
Dept: RADIATION ONCOLOGY | Facility: HOSPITAL | Age: 88
Discharge: HOME | End: 2023-02-01

## 2023-02-01 PROCEDURE — 77399 UNLISTED PX MED RADJ PHYSICS: CPT | Performed by: RADIOLOGY

## 2023-02-01 PROCEDURE — 77332 RADIATION TREATMENT AID(S): CPT | Performed by: RADIOLOGY

## 2023-02-01 PROCEDURE — 77293 RESPIRATOR MOTION MGMT SIMUL: CPT | Performed by: RADIOLOGY

## 2023-02-02 ENCOUNTER — PATIENT OUTREACH (OUTPATIENT)
Dept: CASE MANAGEMENT | Facility: OTHER | Age: 88
End: 2023-02-02
Payer: MEDICARE

## 2023-02-02 NOTE — OUTREACH NOTE
AMBULATORY CASE MANAGEMENT NOTE    Name and Relationship of Patient/Support Person: SHAWN KWON - Emergency Contact    Patient Outreach    Role of ACM explained to daughter, no needs at this time, service declined.    MAINOR MONTIEL  Ambulatory Case Management    2/2/2023, 15:05 EST

## 2023-02-07 ENCOUNTER — OFFICE VISIT (OUTPATIENT)
Dept: INTERNAL MEDICINE | Facility: CLINIC | Age: 88
End: 2023-02-07
Payer: MEDICARE

## 2023-02-07 VITALS
HEART RATE: 64 BPM | SYSTOLIC BLOOD PRESSURE: 144 MMHG | WEIGHT: 246 LBS | TEMPERATURE: 98 F | DIASTOLIC BLOOD PRESSURE: 76 MMHG | RESPIRATION RATE: 18 BRPM | BODY MASS INDEX: 30.75 KG/M2

## 2023-02-07 DIAGNOSIS — I10 ESSENTIAL HYPERTENSION: Primary | ICD-10-CM

## 2023-02-07 PROCEDURE — 99213 OFFICE O/P EST LOW 20 MIN: CPT | Performed by: INTERNAL MEDICINE

## 2023-02-07 PROCEDURE — 77293 RESPIRATOR MOTION MGMT SIMUL: CPT | Performed by: RADIOLOGY

## 2023-02-07 PROCEDURE — 77338 DESIGN MLC DEVICE FOR IMRT: CPT | Performed by: RADIOLOGY

## 2023-02-07 PROCEDURE — 80048 BASIC METABOLIC PNL TOTAL CA: CPT | Performed by: INTERNAL MEDICINE

## 2023-02-07 PROCEDURE — 77300 RADIATION THERAPY DOSE PLAN: CPT | Performed by: RADIOLOGY

## 2023-02-07 PROCEDURE — 77301 RADIOTHERAPY DOSE PLAN IMRT: CPT | Performed by: RADIOLOGY

## 2023-02-07 NOTE — PROGRESS NOTES
Chief Complaint   Patient presents with   • Follow-up     2 week follow up chronic medical problems       History of Present Illness    The patient presents for a follow-up related to hypertension. The patient reports that he has had no headaches, chest pain, dyspnea, edema, syncope, blurred vision or palpitations. He states that he is taking his medication as prescribed. He is not having medication side effects.    Medications      Current Outpatient Medications:   •  albuterol sulfate  (90 Base) MCG/ACT inhaler, Inhale 2 puffs Every 4 (Four) Hours As Needed for Wheezing., Disp: 6.7 g, Rfl: 11  •  allopurinol (ZYLOPRIM) 300 MG tablet, Take 1 tablet by mouth Daily., Disp: 90 tablet, Rfl: 1  •  amiodarone (PACERONE) 200 MG tablet, Take 0.5 tablets by mouth Daily., Disp: 90 tablet, Rfl: 1  •  apixaban (Eliquis) 5 MG tablet tablet, Take 1 tablet by mouth Every 12 (Twelve) Hours., Disp: 180 tablet, Rfl: 1  •  Ascorbic Acid (VITAMIN C) 500 MG capsule, Take 1 tablet by mouth 4 (four) times a day., Disp: , Rfl:   •  docusate sodium (COLACE) 100 MG capsule, Take 300 mg by mouth every night., Disp: , Rfl:   •  Ferrous Gluconate (IRON) 240 (27 FE) MG tablet, Take 1 tablet by mouth daily., Disp: , Rfl:   •  finasteride (PROSCAR) 5 MG tablet, Take 1 tablet by mouth every night at bedtime., Disp: 90 tablet, Rfl: 1  •  furosemide (Lasix) 20 MG tablet, Take 1 tablet by mouth 2 (Two) Times a Day., Disp: 30 tablet, Rfl: 2  •  metFORMIN (GLUCOPHAGE) 1000 MG tablet, Take 1 tablet by mouth 2 (Two) Times a Day., Disp: 180 tablet, Rfl: 1  •  metoprolol tartrate (LOPRESSOR) 100 MG tablet, Take 1 tablet by mouth Every 12 (Twelve) Hours., Disp: 180 tablet, Rfl: 0  •  omeprazole (priLOSEC) 20 MG capsule, Take 1 capsule by mouth Daily., Disp: 90 capsule, Rfl: 1  •  potassium chloride (K-DUR,KLOR-CON) 20 MEQ CR tablet, Take 1 tablet by mouth Daily., Disp: 30 tablet, Rfl: 2  •  pravastatin (PRAVACHOL) 40 MG tablet, Take 1 tablet by mouth  Daily., Disp: 90 tablet, Rfl: 1  •  Probiotic Product (PROBIOTIC ADVANCED PO), Take 1 tablet by mouth 2 (Two) Times a Day., Disp: , Rfl:   •  sertraline (Zoloft) 50 MG tablet, Take 1 tablet by mouth Daily., Disp: 90 tablet, Rfl: 0  •  tamsulosin (FLOMAX) 0.4 MG capsule 24 hr capsule, Take 1 capsule by mouth Daily., Disp: 90 capsule, Rfl: 1  •  Tiotropium Bromide Monohydrate (Spiriva Respimat) 1.25 MCG/ACT aerosol solution inhaler, Inhale 2 puffs Daily., Disp: , Rfl:   •  valsartan (DIOVAN) 80 MG tablet, Take 1 tablet by mouth Daily., Disp: 90 tablet, Rfl: 1    Current Facility-Administered Medications:   •  albuterol sulfate HFA (PROVENTIL HFA;VENTOLIN HFA;PROAIR HFA) inhaler 4 puff, 4 puff, Inhalation, Once, Vikram Eagle MD     Allergies    Allergies   Allergen Reactions   • Xarelto [Rivaroxaban] GI Bleeding     GI bleed   • Penicillins Hives     Has tolerated cefepime, ceftriaxone       Problem List    Patient Active Problem List   Diagnosis   • Atopic rhinitis   • Benign (familial) paraproteinemia   • Type 2 diabetes mellitus with stage 3 chronic kidney disease, without long-term current use of insulin (HCC)   • Enlarged prostate without lower urinary tract symptoms (luts)   • Gastroesophageal reflux disease with esophagitis   • Polyp of gallbladder   • Gout   • Liver cyst   • Hyperlipidemia LDL goal <100   • Essential hypertension   • Nephrolithiasis   • Obstructive sleep apnea syndrome   • Paroxysmal atrial fibrillation (HCC)   • Stage 3 chronic kidney disease (HCC)   • Long term current use of antiarrhythmic medical therapy   • Hydronephrosis   • Coronary artery disease involving native coronary artery of native heart with angina pectoris (HCC)   • Acute respiratory failure with hypoxia (HCC)   • Influenza A   • Hypertensive urgency   • Type 2 diabetes mellitus (HCC)   • Malignant neoplasm of lower lobe of left lung (HCC)       Medications, Allergies, Problems List and Past History were reviewed and  updated.    Physical Examination    /76 (BP Location: Left arm, Patient Position: Sitting, Cuff Size: Adult)   Pulse 64   Temp 98 °F (36.7 °C) (Infrared)   Resp 18   Wt 112 kg (246 lb)   BMI 30.75 kg/m²     Neck: Thyroid- non enlarged, symmetric and has no nodules. No bruits are detected.    Lungs: Auscultation- Clear to auscultation bilaterally. There are no retractions, clubbing or cyanosis. The Expiratory to Inspiratory ratio is equal.    Cardiovascular: There are no carotid bruits. Heart- Normal Rate with Regular rhythm and no murmurs. There are no gallops. There are no rubs. In the lower extremities there is trace pretibial pitting edema to the level of the ankles. The upper extremities do not have edema.    Abdomen: Soft, benign, non-tender with no masses, hernias, organomegaly or scars.    Impression and Assessment    Essential Hypertension.    Plan    Essential Hypertension Plan: The patient was instructed to continue the current medications.    Diagnoses and all orders for this visit:    1. Essential hypertension (Primary)  -     Basic Metabolic Panel; Future        Return to Office    The patient was instructed to return for follow-up in 6 weeks. The patient was instructed to return sooner if the condition changes, worsens, or does not resolve.

## 2023-02-08 LAB
ANION GAP SERPL CALCULATED.3IONS-SCNC: 4 MMOL/L (ref 5–15)
BUN SERPL-MCNC: 23 MG/DL (ref 8–23)
BUN/CREAT SERPL: 21.1 (ref 7–25)
CALCIUM SPEC-SCNC: 8.9 MG/DL (ref 8.6–10.5)
CHLORIDE SERPL-SCNC: 101 MMOL/L (ref 98–107)
CO2 SERPL-SCNC: 33 MMOL/L (ref 22–29)
CREAT SERPL-MCNC: 1.09 MG/DL (ref 0.76–1.27)
EGFRCR SERPLBLD CKD-EPI 2021: 66.1 ML/MIN/1.73
GLUCOSE SERPL-MCNC: 88 MG/DL (ref 65–99)
POTASSIUM SERPL-SCNC: 4.2 MMOL/L (ref 3.5–5.2)
SODIUM SERPL-SCNC: 138 MMOL/L (ref 136–145)

## 2023-02-14 ENCOUNTER — HOSPITAL ENCOUNTER (OUTPATIENT)
Dept: RADIATION ONCOLOGY | Facility: HOSPITAL | Age: 88
Discharge: HOME | End: 2023-02-14

## 2023-02-14 PROCEDURE — 77373 STRTCTC BDY RAD THER TX DLVR: CPT | Performed by: RADIOLOGY

## 2023-02-15 ENCOUNTER — DOCUMENTATION (OUTPATIENT)
Dept: NUTRITION | Facility: HOSPITAL | Age: 88
End: 2023-02-15
Payer: MEDICARE

## 2023-02-15 NOTE — PROGRESS NOTES
"Outpatient Oncology Nutrition     Reason for Visit:     Oncology Nutrition Screening and Patient Education    Patient Name:  Dairen Camarena    :  1935    MRN:  [id number]    Date of Encounter: 02/15/2023    Nutrition Assessment     Diagnosis:  Left lower lobe lesion highly concerning for primary bronchogenic carcinoma / appears to be cT1b N0 M0 primary bronchogenic carcinoma    Radiation:  45-50 Gray in 5 fractions with a heterogeneous SBRT plan.    Patient Active Problem List   Diagnosis   • Atopic rhinitis   • Benign (familial) paraproteinemia   • Type 2 diabetes mellitus with stage 3 chronic kidney disease, without long-term current use of insulin (HCC)   • Enlarged prostate without lower urinary tract symptoms (luts)   • Gastroesophageal reflux disease with esophagitis   • Polyp of gallbladder   • Gout   • Liver cyst   • Hyperlipidemia LDL goal <100   • Essential hypertension   • Nephrolithiasis   • Obstructive sleep apnea syndrome   • Paroxysmal atrial fibrillation (HCC)   • Stage 3 chronic kidney disease (HCC)   • Long term current use of antiarrhythmic medical therapy   • Hydronephrosis   • Coronary artery disease involving native coronary artery of native heart with angina pectoris (HCC)   • Acute respiratory failure with hypoxia (HCC)   • Influenza A   • Hypertensive urgency   • Type 2 diabetes mellitus (HCC)   • Malignant neoplasm of lower lobe of left lung (HCC)       Food / Nutrition Related History       Hydration Status     Goal: 95 ounces    How many 8 ounces glasses of water do you consume per day?    Enteral Feeding   NA    Anthropometric Measurements     Height:    Ht Readings from Last 1 Encounters:   23 190.5 cm (75\")       Weight:    Wt Readings from Last 1 Encounters:   23 112 kg (246 lb)       BMI: 30.75 / overweight    Weight Change: Stable weight range    Review of Lab Data (Time Frame - 1 month / 2 month)       Medication Review   Reviewed 2/15/23    Nutrition " Focused Physical Findings       Nutrition Impact Symptoms   None noted      Physical Activity      Normal with no limitations    Current Nutritional Intake     Oral diet:  Regular    Malnutrition Risk Assessment     Recent weight loss over the past 6 months:  0 = No    How much weight loss:      Eating poorly because of a decreased appetite:  0 = No    Malnutrition Screening Score:     MST = 0 or 1 Patient not at risk for malnutrition    Nutrition Diagnosis     Problem    Etiology    Signs / Symptoms      Nutrition Intervention   Initial consultation with patient as he progresses with treatment.  Patient recently  3 months ago; he lives alone and continues to work at his job that he has had for 30 years.  He retains a good appetite; eats most of his meals out related to his recent lifestyle change.  Provided some suggestions for foods that are easy to prepare and keep on hand to improve the nutritional adequacy of his diet and provide additional choices.  Also discussed ONS and their role in supplementing oral intake as needed to boost protein and nutrient intake.    Goal       Monitoring / Evaluation

## 2023-02-16 ENCOUNTER — HOSPITAL ENCOUNTER (OUTPATIENT)
Dept: RADIATION ONCOLOGY | Facility: HOSPITAL | Age: 88
Discharge: HOME | End: 2023-02-16

## 2023-02-16 PROCEDURE — 77373 STRTCTC BDY RAD THER TX DLVR: CPT | Performed by: RADIOLOGY

## 2023-02-20 ENCOUNTER — HOSPITAL ENCOUNTER (OUTPATIENT)
Dept: RADIATION ONCOLOGY | Facility: HOSPITAL | Age: 88
Discharge: HOME | End: 2023-02-20

## 2023-02-20 PROCEDURE — 77373 STRTCTC BDY RAD THER TX DLVR: CPT | Performed by: RADIOLOGY

## 2023-02-22 ENCOUNTER — HOSPITAL ENCOUNTER (OUTPATIENT)
Dept: RADIATION ONCOLOGY | Facility: HOSPITAL | Age: 88
Discharge: HOME | End: 2023-02-22

## 2023-02-22 PROCEDURE — 77373 STRTCTC BDY RAD THER TX DLVR: CPT | Performed by: RADIOLOGY

## 2023-02-24 ENCOUNTER — HOSPITAL ENCOUNTER (OUTPATIENT)
Dept: RADIATION ONCOLOGY | Facility: HOSPITAL | Age: 88
Discharge: HOME | End: 2023-02-24

## 2023-02-24 PROCEDURE — 77336 RADIATION PHYSICS CONSULT: CPT | Performed by: RADIOLOGY

## 2023-02-24 PROCEDURE — 77373 STRTCTC BDY RAD THER TX DLVR: CPT | Performed by: RADIOLOGY

## 2023-02-27 DIAGNOSIS — I10 ESSENTIAL HYPERTENSION: ICD-10-CM

## 2023-02-27 RX ORDER — TIOTROPIUM BROMIDE INHALATION SPRAY 1.56 UG/1
2 SPRAY, METERED RESPIRATORY (INHALATION)
Qty: 12 G | Refills: 1 | Status: SHIPPED | OUTPATIENT
Start: 2023-02-27

## 2023-02-27 RX ORDER — POTASSIUM CHLORIDE 20 MEQ/1
20 TABLET, EXTENDED RELEASE ORAL DAILY
Qty: 90 TABLET | Refills: 1 | Status: SHIPPED | OUTPATIENT
Start: 2023-02-27

## 2023-03-07 ENCOUNTER — OFFICE VISIT (OUTPATIENT)
Dept: CARDIOLOGY | Facility: CLINIC | Age: 88
End: 2023-03-07
Payer: MEDICARE

## 2023-03-07 VITALS
HEART RATE: 108 BPM | OXYGEN SATURATION: 98 % | BODY MASS INDEX: 30.46 KG/M2 | RESPIRATION RATE: 18 BRPM | SYSTOLIC BLOOD PRESSURE: 108 MMHG | WEIGHT: 245 LBS | HEIGHT: 75 IN | DIASTOLIC BLOOD PRESSURE: 60 MMHG

## 2023-03-07 DIAGNOSIS — I48.0 PAROXYSMAL ATRIAL FIBRILLATION: Chronic | ICD-10-CM

## 2023-03-07 DIAGNOSIS — I25.119 CORONARY ARTERY DISEASE INVOLVING NATIVE CORONARY ARTERY OF NATIVE HEART WITH ANGINA PECTORIS: Primary | ICD-10-CM

## 2023-03-07 DIAGNOSIS — E78.5 HYPERLIPIDEMIA LDL GOAL <100: Chronic | ICD-10-CM

## 2023-03-07 DIAGNOSIS — I10 ESSENTIAL HYPERTENSION: Chronic | ICD-10-CM

## 2023-03-07 PROCEDURE — 93000 ELECTROCARDIOGRAM COMPLETE: CPT | Performed by: NURSE PRACTITIONER

## 2023-03-07 PROCEDURE — 99214 OFFICE O/P EST MOD 30 MIN: CPT | Performed by: NURSE PRACTITIONER

## 2023-03-07 NOTE — ASSESSMENT & PLAN NOTE
· Hypertension is controlled  · Continue metoprolol tartrate 100 mg twice daily  · Continue valsartan 80 mg daily

## 2023-03-07 NOTE — ASSESSMENT & PLAN NOTE
· No signs or symptoms of angina  · Defer aspirin due to minimal CAD and chronic anticoagulation with Eliquis

## 2023-03-07 NOTE — ASSESSMENT & PLAN NOTE
· Maintaining normal sinus rhythm  · Continue Eliquis 5 mg twice daily for stroke prophylaxis  · Continue metoprolol tartrate 100 mg twice daily

## 2023-03-09 ENCOUNTER — OFFICE VISIT (OUTPATIENT)
Dept: PULMONOLOGY | Facility: CLINIC | Age: 88
End: 2023-03-09
Payer: MEDICARE

## 2023-03-09 VITALS
WEIGHT: 249.8 LBS | BODY MASS INDEX: 31.06 KG/M2 | TEMPERATURE: 97.3 F | DIASTOLIC BLOOD PRESSURE: 86 MMHG | SYSTOLIC BLOOD PRESSURE: 142 MMHG | HEIGHT: 75 IN | HEART RATE: 60 BPM | OXYGEN SATURATION: 91 %

## 2023-03-09 DIAGNOSIS — J43.8 OTHER EMPHYSEMA: Primary | ICD-10-CM

## 2023-03-09 PROCEDURE — 99214 OFFICE O/P EST MOD 30 MIN: CPT | Performed by: INTERNAL MEDICINE

## 2023-03-09 NOTE — PROGRESS NOTES
PULMONARY FOLLOW-UP OUTPATIENT NOTE:     Patient ID/Chief Complaint: .Darien Camarena is a 87 y.o. male presenting for follow up on lung nodule, COPD.     History of Present Illness: Patient Isdiro is an 87 year old male with past medical history of hypertension, CAD, atrial fibrillation, HLD and STEVEN.  He was first evaluated in pulmonary clinic on 1/10/2023 following a recent hospitalization for acute hypoxic respiratory failure secondary to influenza A in December 2022.  Thoracic imaging during hospitalization revealed an incidental left lower lobe pulmonary nodule. This was followed up with a PET scan (1/04/2023) demonstrating avidity.  After much deliberation on work-up and treatment of left lower lobe nodule (ie bronchoscopy, serial imaging, empiric radiation)-- the decision was made to pursue SBRT which was performed in Feburary/March 2023.     At baseline patient noted exertional dyspnea.  Pulmonary function testing at outpatient follow-up with severe obstructive lung disease.  Patient started on an inhaler regimen which has overall improved quality of life and exercise tolerance.  He denies chest pain, cough, fever, chills, lymphadenopathy, night sweats, weight loss, nausea, vomiting, numbness/tingling in arms/neck, syncope and presyncope.    PFSH: Reviewed in EMR, Significant Changes Noted Above    Review of Systems: Fourteen point review of systems performed and negative except for those issues listed in HPI    Medications:  Current Outpatient Medications   Medication Sig Dispense Refill   • albuterol sulfate  (90 Base) MCG/ACT inhaler Inhale 2 puffs Every 4 (Four) Hours As Needed for Wheezing. 6.7 g 11   • allopurinol (ZYLOPRIM) 300 MG tablet Take 1 tablet by mouth Daily. 90 tablet 1   • amiodarone (PACERONE) 200 MG tablet Take 0.5 tablets by mouth Daily. 90 tablet 1   • apixaban (Eliquis) 5 MG tablet tablet Take 1 tablet by mouth Every 12 (Twelve) Hours. 180 tablet 1   • Ascorbic Acid (VITAMIN  C) 500 MG capsule Take 1 tablet by mouth 4 (four) times a day.     • docusate sodium (COLACE) 100 MG capsule Take 3 capsules by mouth Every Night.     • Ferrous Gluconate (IRON) 240 (27 FE) MG tablet Take 1 tablet by mouth Daily.     • finasteride (PROSCAR) 5 MG tablet Take 1 tablet by mouth every night at bedtime. 90 tablet 1   • furosemide (Lasix) 20 MG tablet Take 1 tablet by mouth 2 (Two) Times a Day. 30 tablet 2   • metFORMIN (GLUCOPHAGE) 1000 MG tablet Take 1 tablet by mouth 2 (Two) Times a Day. 180 tablet 1   • metoprolol tartrate (LOPRESSOR) 100 MG tablet Take 1 tablet by mouth Every 12 (Twelve) Hours. 180 tablet 0   • omeprazole (priLOSEC) 20 MG capsule Take 1 capsule by mouth Daily. 90 capsule 1   • potassium chloride (K-DUR,KLOR-CON) 20 MEQ CR tablet Take 1 tablet by mouth Daily. 90 tablet 1   • pravastatin (PRAVACHOL) 40 MG tablet Take 1 tablet by mouth Daily. 90 tablet 1   • Probiotic Product (PROBIOTIC ADVANCED PO) Take 1 tablet by mouth 2 (Two) Times a Day.     • sertraline (Zoloft) 50 MG tablet Take 1 tablet by mouth Daily. 90 tablet 0   • tamsulosin (FLOMAX) 0.4 MG capsule 24 hr capsule Take 1 capsule by mouth Daily. 90 capsule 1   • Tiotropium Bromide Monohydrate (Spiriva Respimat) 1.25 MCG/ACT aerosol solution inhaler Inhale 2 puffs Daily. 12 g 1   • valsartan (DIOVAN) 80 MG tablet Take 1 tablet by mouth Daily. 90 tablet 1     Current Facility-Administered Medications   Medication Dose Route Frequency Provider Last Rate Last Admin   • albuterol sulfate HFA (PROVENTIL HFA;VENTOLIN HFA;PROAIR HFA) inhaler 4 puff  4 puff Inhalation Once Vikram Eagle MD         Immunizations:  Chart reviewed: immunizations are up to date and documented.  Immunization History   Administered Date(s) Administered   • COVID-19 (PFIZER) BIVALENT BOOSTER 12+YRS 12/22/2022   • COVID-19 (PFIZER) PURPLE CAP 01/26/2021, 02/19/2021   • Fluzone High Dose =>65 Years ([facility] ONLY) 10/01/2016, 09/18/2017, 09/15/2018, 10/12/2019,  09/18/2020   • Fluzone High-Dose 65+yrs 09/19/2020, 09/25/2021, 12/22/2022   • Hepatitis A 09/15/2018, 11/01/2018   • Pneumococcal Conjugate 13-Valent (PCV13) 10/26/2015   • Pneumococcal Polysaccharide (PPSV23) 06/24/2006, 09/18/2020   • Pneumococcal, Unspecified 11/30/2006   • Shingrix 09/25/2021   • Td 06/24/2005   • Tdap 06/14/2018       Physical Examination:  Vitals:    03/09/23 0856   BP: 142/86   Pulse: 60   Temp: 97.3 °F (36.3 °C)   SpO2: 91%     General: The patient appears in no acute distress.  Alert, cooperative and interactive.   HEENT: NC/AT, PERRL, Normal nasal mucosa.  Neck: Trachea midline, No masses.  Chest: Clear to auscultation bilaterally, No wheezing, rhonchi, or rales. Normal work of breathing. Equal chest rise. On room air with appropriate oxygen saturations.   Cardiac: Regular rhythm, normal rate, S1S2 auscultated. No murmurs, rubs or gallops.   Extremities: 1+ lower extremity edema (R>L). No clubbing or cyanosis.  Skin: No rashes, open wounds, or bruising.  Warm, dry, well-perfused.  Neuro: Motor power grossly intact bilaterally.  Sensation intact.  Speech fluid and fluent.  Thought process coherent.  Psych: Alert and oriented x3. Mood stable.    Review of Data:  Laboratory Studies: I personally reviewed recent lab work in EMR    Radiology Results: I have personally reviewed and interpreted the images in EMR    PET (1/04/2023):  Radiology Impression:   The subsolid left lower lobe nodule is mildly hypermetabolic, highly concerning for primary malignancy. Tissue sampling is recommended. No enlarged or hypermetabolic hilar or mediastinal lymph nodes to suggest prabhu metastases at this time. No evidence   of distant metastatic disease.    PFT (1/24/2023): Personally reviewed/interpreted study.  There is severe airflow obstruction (FEV1 43% predicted).  There is a significant response in FVC to a one-time dose of beta agonist bronchodilators (14%). There is concomitant severe restriction.  The  diffusing capacity, uncorrected for hemoglobin, is mildly reduced.    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  Personally reviewed recent CTA chest as well as PET scan.  Compared with prior CTA from June 2022 where opacities in the area of question make it difficult to appreciate presence of nodule. Nevertheless, CTA from 12/12/2022 shows a subsolid nodule in patient's left lower lobe with an increasing solid component.  Follow-up PET scan on 1/04/2023 with avidity.  Radiology concern for primary malignancy, likely primary bronchogenic carcinoma. Now s/p empiric radiation (Feburary/March 2023). Has f/u with radiation later this month (3/29/2023). Will f/u serial imaging recommendations after this visit.     - Switched Spiriva to Stiolto (LABA/Anticholinergic); Will continue as needed albuterol.  Spent approximately 10x minutes on inhaler management and technique.     - LE edema has improved since restarting lasix    -F/U in 3 months     -- Jose Eagle MD   Pulmonary/Critical Care    Approximately 34 minutes were spent reviewing pertinent medical/family/social history, performing physical exam, clinically evaluating, interpreting labs/imaging, ordering necessary studies (i.e. medication, tests, procedures) and counseling patient. This does not include time spent with patient by nursing or clerical staff.

## 2023-03-21 ENCOUNTER — OFFICE VISIT (OUTPATIENT)
Dept: INTERNAL MEDICINE | Facility: CLINIC | Age: 88
End: 2023-03-21
Payer: MEDICARE

## 2023-03-21 VITALS
TEMPERATURE: 96.9 F | BODY MASS INDEX: 31.83 KG/M2 | SYSTOLIC BLOOD PRESSURE: 116 MMHG | HEIGHT: 75 IN | DIASTOLIC BLOOD PRESSURE: 78 MMHG | RESPIRATION RATE: 18 BRPM | WEIGHT: 256 LBS | HEART RATE: 72 BPM | OXYGEN SATURATION: 95 %

## 2023-03-21 DIAGNOSIS — I10 ESSENTIAL HYPERTENSION: Primary | ICD-10-CM

## 2023-03-21 PROCEDURE — 99213 OFFICE O/P EST LOW 20 MIN: CPT | Performed by: INTERNAL MEDICINE

## 2023-03-21 RX ORDER — ALBUTEROL SULFATE 90 UG/1
2 AEROSOL, METERED RESPIRATORY (INHALATION) EVERY 4 HOURS PRN
Qty: 18 G | Refills: 5 | Status: SHIPPED | OUTPATIENT
Start: 2023-03-21

## 2023-03-21 NOTE — PROGRESS NOTES
Chief Complaint   Patient presents with   • Follow-up     6wk fu, Chronic health conditions   • Hypertension       History of Present Illness      The patient presents for a follow-up related to hypertension. The patient reports that he has had no headaches, chest pain, dyspnea, edema, syncope, blurred vision or palpitations. He states that he is taking his medication as prescribed. He is not having medication side effects.    Medications      Current Outpatient Medications:   •  albuterol sulfate  (90 Base) MCG/ACT inhaler, Inhale 2 puffs Every 4 (Four) Hours As Needed for Wheezing., Disp: 18 g, Rfl: 5  •  allopurinol (ZYLOPRIM) 300 MG tablet, Take 1 tablet by mouth Daily., Disp: 90 tablet, Rfl: 1  •  amiodarone (PACERONE) 200 MG tablet, Take 0.5 tablets by mouth Daily., Disp: 90 tablet, Rfl: 1  •  apixaban (Eliquis) 5 MG tablet tablet, Take 1 tablet by mouth Every 12 (Twelve) Hours., Disp: 180 tablet, Rfl: 1  •  Ascorbic Acid (VITAMIN C) 500 MG capsule, Take 1 tablet by mouth 4 (four) times a day., Disp: , Rfl:   •  docusate sodium (COLACE) 100 MG capsule, Take 3 capsules by mouth Every Night., Disp: , Rfl:   •  Ferrous Gluconate (IRON) 240 (27 FE) MG tablet, Take 1 tablet by mouth Daily., Disp: , Rfl:   •  finasteride (PROSCAR) 5 MG tablet, Take 1 tablet by mouth every night at bedtime., Disp: 90 tablet, Rfl: 1  •  furosemide (Lasix) 20 MG tablet, Take 1 tablet by mouth 2 (Two) Times a Day., Disp: 30 tablet, Rfl: 2  •  metFORMIN (GLUCOPHAGE) 1000 MG tablet, Take 1 tablet by mouth 2 (Two) Times a Day., Disp: 180 tablet, Rfl: 1  •  metoprolol tartrate (LOPRESSOR) 100 MG tablet, Take 1 tablet by mouth Every 12 (Twelve) Hours., Disp: 180 tablet, Rfl: 0  •  omeprazole (priLOSEC) 20 MG capsule, Take 1 capsule by mouth Daily., Disp: 90 capsule, Rfl: 1  •  potassium chloride (K-DUR,KLOR-CON) 20 MEQ CR tablet, Take 1 tablet by mouth Daily., Disp: 90 tablet, Rfl: 1  •  pravastatin (PRAVACHOL) 40 MG tablet, Take 1  tablet by mouth Daily., Disp: 90 tablet, Rfl: 1  •  Probiotic Product (PROBIOTIC ADVANCED PO), Take 1 tablet by mouth 2 (Two) Times a Day., Disp: , Rfl:   •  sertraline (Zoloft) 50 MG tablet, Take 1 tablet by mouth Daily., Disp: 90 tablet, Rfl: 0  •  tamsulosin (FLOMAX) 0.4 MG capsule 24 hr capsule, Take 1 capsule by mouth Daily., Disp: 90 capsule, Rfl: 1  •  Tiotropium Bromide Monohydrate (Spiriva Respimat) 1.25 MCG/ACT aerosol solution inhaler, Inhale 2 puffs Daily., Disp: 12 g, Rfl: 1  •  tiotropium bromide-olodaterol (STIOLTO RESPIMAT) 2.5-2.5 MCG/ACT aerosol solution inhaler, Inhale 2 puffs Daily. 2 inh once a day, Disp: 1 each, Rfl: 3  •  valsartan (DIOVAN) 80 MG tablet, Take 1 tablet by mouth Daily., Disp: 90 tablet, Rfl: 1    Current Facility-Administered Medications:   •  albuterol sulfate HFA (PROVENTIL HFA;VENTOLIN HFA;PROAIR HFA) inhaler 4 puff, 4 puff, Inhalation, Once, Vikram Eagle MD     Allergies    Allergies   Allergen Reactions   • Xarelto [Rivaroxaban] GI Bleeding     GI bleed   • Penicillins Hives     Has tolerated cefepime, ceftriaxone       Problem List    Patient Active Problem List   Diagnosis   • Atopic rhinitis   • Benign (familial) paraproteinemia   • Type 2 diabetes mellitus with stage 3 chronic kidney disease, without long-term current use of insulin (HCC)   • Enlarged prostate without lower urinary tract symptoms (luts)   • Gastroesophageal reflux disease with esophagitis   • Polyp of gallbladder   • Gout   • Liver cyst   • Hyperlipidemia LDL goal <100   • Essential hypertension   • Nephrolithiasis   • Obstructive sleep apnea syndrome   • Paroxysmal atrial fibrillation (HCC)   • Stage 3 chronic kidney disease (HCC)   • Long term current use of antiarrhythmic medical therapy   • Hydronephrosis   • Coronary artery disease involving native coronary artery of native heart with angina pectoris (HCC)   • Acute respiratory failure with hypoxia (HCC)   • Influenza A   • Hypertensive urgency  "  • Type 2 diabetes mellitus (HCC)   • Malignant neoplasm of lower lobe of left lung (HCC)       Medications, Allergies, Problems List and Past History were reviewed and updated.    Physical Examination    /78   Pulse 72   Temp 96.9 °F (36.1 °C) (Infrared)   Resp 18   Ht 190.5 cm (75\")   Wt 116 kg (256 lb)   SpO2 95%   BMI 32.00 kg/m²     Neck: Thyroid- non enlarged, symmetric and has no nodules. No bruits are detected.    Lungs: Auscultation- Clear to auscultation bilaterally. There are no retractions, clubbing or cyanosis. The Expiratory to Inspiratory ratio is equal.    Cardiovascular: Heart- Normal Rate with Regular rhythm and no murmurs.    Impression and Assessment    Essential Hypertension.    Plan    Essential Hypertension Plan: The patient was instructed to continue the current medications.    Diagnoses and all orders for this visit:    1. Essential hypertension (Primary)          Return to Office    The patient was instructed to return for follow-up in 3 months. The patient was instructed to return sooner if the condition changes, worsens, or does not resolve.  "

## 2023-03-29 ENCOUNTER — OFFICE VISIT (OUTPATIENT)
Dept: RADIATION ONCOLOGY | Facility: HOSPITAL | Age: 88
End: 2023-03-29
Payer: MEDICARE

## 2023-03-29 ENCOUNTER — HOSPITAL ENCOUNTER (OUTPATIENT)
Dept: RADIATION ONCOLOGY | Facility: HOSPITAL | Age: 88
Setting detail: RADIATION/ONCOLOGY SERIES
Discharge: HOME | End: 2023-03-29
Payer: MEDICARE

## 2023-03-29 VITALS
RESPIRATION RATE: 16 BRPM | SYSTOLIC BLOOD PRESSURE: 175 MMHG | OXYGEN SATURATION: 93 % | DIASTOLIC BLOOD PRESSURE: 77 MMHG | TEMPERATURE: 97.9 F | WEIGHT: 254.9 LBS | HEART RATE: 65 BPM | BODY MASS INDEX: 31.86 KG/M2

## 2023-03-29 DIAGNOSIS — C34.32 MALIGNANT NEOPLASM OF LOWER LOBE OF LEFT LUNG: Primary | ICD-10-CM

## 2023-03-29 PROCEDURE — G0463 HOSPITAL OUTPT CLINIC VISIT: HCPCS

## 2023-03-29 NOTE — PROGRESS NOTES
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 male with multiple medical comorbidities including COPD, A. Fib on Eliquis, hypertension, CKD, and remote tobacco abuse who was incidentally found to have a 2.2 cm left lower lobe pulmonary nodule on CT scan of the chest for work-up of acute hypoxic respiratory failure secondary to Influenza A infection.  The patient was sent for outpatient PET scan, showing the subsolid left lower lobe nodule to be mildly hypermetabolic and concerning for primary malignancy.  There was otherwise no evidence of hypermetabolic hilar/mediastinal lymphadenopathy or distant metastatic disease.  The patient was uninterested in risks associated with biopsy.  The patient was not considered a candidate for surgery.  The patient underwent definitive empiric treatment with a course of SBRT consisting of 50 Gy in 5 fractions delievered on the [employer] Radixact.  He completed treatments 2023.  He tolerated treatments well.  From a symptom standpoint, the patient denies exacerbation of fatigue.  He reports he continues to work full-time.  He continues with mild stable exertional dyspnea and denies cough or new respiratory complaints.  He denies chest pain.  He denies esophagitis symptoms.  He denies fever or chills.  He reports some recent weight gain.  No new or progressive bone pains.  Overall, he feels well and denies additional concerns today.            MEDICATIONS: Medication reconciliation for the patient was reviewed and confirmed in the electronic medical record.    Review of Systems    Respiratory: Positive for shortness of breath (with exertion).    Cardiovascular: Positive for leg swelling (improved).   All other systems reviewed and are negative.          KPS 80%        Physical Exam  Vitals and nursing note reviewed.   Constitutional:       General: He is not in acute distress.     Appearance: Normal appearance. He is well-developed.   HENT:      Head: Normocephalic and atraumatic.   Eyes:      Conjunctiva/sclera: Conjunctivae normal.      Pupils: Pupils are equal, round, and reactive to light.   Cardiovascular:      Rate and Rhythm: Normal rate and regular rhythm.      Heart sounds: No murmur heard.    No friction rub.   Pulmonary:      Effort: Pulmonary effort is normal.      Breath sounds: Normal breath sounds. No wheezing.   Chest:      Chest wall: No tenderness.   Musculoskeletal:         General: Normal range of motion.      Cervical back: Normal range of motion and neck supple.   Lymphadenopathy:      Cervical: No cervical adenopathy.   Skin:     General: Skin is warm and dry.   Neurological:      Mental Status: He is alert and oriented to person, place, and time.   Psychiatric:         Behavior: Behavior normal.         Thought Content: Thought content normal.         Judgment: Judgment normal.         VITAL SIGNS:   Vitals:    03/29/23 1050   BP: 175/77   Pulse: 65   Resp: 16   Temp: 97.9 °F (36.6 °C)   TempSrc: Temporal   SpO2: 93%   Weight: 116 kg (254 lb 14.4 oz)   PainSc: 0-No pain           The following portions of the patient's history were reviewed and updated as appropriate: allergies, current medications, past family history, past medical history, past social history, past surgical history and problem list.         Diagnoses and all orders for this visit:    1. Malignant neoplasm of lower lobe of left lung (HCC) (Primary)  -     CT Chest Without Contrast; Future         IMPRESSION:  Darien Camarena is an 87 y.o. gentleman with suspected early stage primary bronchogenic  carcinoma of the left lower lobe of lung.  The patient was uninterested in biopsy.  The patient underwent definitive, empiric SBRT to this lesion, completing 1 month ago.  The patient tolerated treatment well.  He did not develop acute radiation-related toxicities.  He has had no change to his stable, baseline respiratory symptoms in the setting of COPD.  He will be due for repeat CT scan of the chest ~late May 2023, three months out from treatment, to evaluate response to treatment.  Order placed today, without contrast in light of the patient's CKD.  The patient and I reviewed typical survivoship content, including follow up intervals, surveillance imaging, and expectations for response to treatment.  We discussed NCCN guidelines including H&P and chest CT ± contrast every 3-6 months for 3 years, then H&P and chest CT ± contrast every 6 months for 2 years, then H&P and a low-dose non-contrasted chest CT annually.  We will schedule the patient to return in ~late May 2023 following repeat imaging.      RECOMMENDATIONS:    Left lower lobe primary bronchogenic carcinoma  -cT1b N0 M0   -Radiographic diagnosis  -Hypermetabolic on PET scan  -No evidence of regional or distant metastatic disease  -Patient uninterested in biopsy  -Status post definitive empiric SBRT on the Radixact, 50 Gy in 5 fractions   -completed 2/24/2023  -Recommend repeat CT scan of chest ~late May 2023  -RTC at that time  -Follows with Dr. Eagle    COPD  -Continues daily Stilolto, albuterol as needed  -Follows with Dr. Eagle    Return in about 2 months (around 5/29/2023) for Office Visit, Imaging - See orders.    BERYL Boone      I spent a total of 30 minutes on today's visit, with more than 15 minutes in direct face to face communication, and the remainder of the time spent in reviewing the relevant history, records, available imaging, and for documentation.

## 2023-04-22 DIAGNOSIS — I10 ESSENTIAL HYPERTENSION: ICD-10-CM

## 2023-04-24 RX ORDER — FUROSEMIDE 20 MG/1
20 TABLET ORAL 2 TIMES DAILY
Qty: 30 TABLET | Refills: 2 | Status: SHIPPED | OUTPATIENT
Start: 2023-04-24

## 2023-06-05 ENCOUNTER — HOSPITAL ENCOUNTER (OUTPATIENT)
Dept: CT IMAGING | Facility: HOSPITAL | Age: 88
Discharge: HOME | End: 2023-06-05
Admitting: NURSE PRACTITIONER
Payer: MEDICARE

## 2023-06-05 DIAGNOSIS — C34.32 MALIGNANT NEOPLASM OF LOWER LOBE OF LEFT LUNG: ICD-10-CM

## 2023-06-05 PROCEDURE — 71250 CT THORAX DX C-: CPT

## 2023-06-06 ENCOUNTER — PREP FOR SURGERY (OUTPATIENT)
Dept: CARDIOLOGY | Facility: CLINIC | Age: 88
End: 2023-06-06
Payer: MEDICARE

## 2023-06-06 ENCOUNTER — OFFICE VISIT (OUTPATIENT)
Dept: INTERNAL MEDICINE | Facility: CLINIC | Age: 88
End: 2023-06-06
Payer: MEDICARE

## 2023-06-06 ENCOUNTER — TELEPHONE (OUTPATIENT)
Dept: INTERNAL MEDICINE | Facility: CLINIC | Age: 88
End: 2023-06-06
Payer: MEDICARE

## 2023-06-06 VITALS
SYSTOLIC BLOOD PRESSURE: 144 MMHG | TEMPERATURE: 98.2 F | DIASTOLIC BLOOD PRESSURE: 106 MMHG | HEIGHT: 75 IN | HEART RATE: 84 BPM | WEIGHT: 275 LBS | BODY MASS INDEX: 34.19 KG/M2 | RESPIRATION RATE: 20 BRPM

## 2023-06-06 DIAGNOSIS — I48.19 ATRIAL FIBRILLATION, PERSISTENT: Primary | ICD-10-CM

## 2023-06-06 DIAGNOSIS — R10.9 RIGHT FLANK PAIN: Primary | ICD-10-CM

## 2023-06-06 DIAGNOSIS — N30.00 ACUTE CYSTITIS WITHOUT HEMATURIA: ICD-10-CM

## 2023-06-06 DIAGNOSIS — I48.0 PAROXYSMAL ATRIAL FIBRILLATION: Primary | Chronic | ICD-10-CM

## 2023-06-06 DIAGNOSIS — I48.0 PAROXYSMAL ATRIAL FIBRILLATION: ICD-10-CM

## 2023-06-06 LAB
ANION GAP SERPL CALCULATED.3IONS-SCNC: 8.9 MMOL/L (ref 5–15)
BASOPHILS # BLD AUTO: 0.04 10*3/MM3 (ref 0–0.2)
BASOPHILS NFR BLD AUTO: 0.6 % (ref 0–1.5)
BILIRUB BLD-MCNC: NEGATIVE MG/DL
BUN SERPL-MCNC: 18 MG/DL (ref 8–23)
BUN/CREAT SERPL: 19.6 (ref 7–25)
CALCIUM SPEC-SCNC: 9.1 MG/DL (ref 8.6–10.5)
CHLORIDE SERPL-SCNC: 99 MMOL/L (ref 98–107)
CLARITY, POC: ABNORMAL
CO2 SERPL-SCNC: 30.1 MMOL/L (ref 22–29)
COLOR UR: YELLOW
CREAT SERPL-MCNC: 0.92 MG/DL (ref 0.76–1.27)
DEPRECATED RDW RBC AUTO: 47.8 FL (ref 37–54)
EGFRCR SERPLBLD CKD-EPI 2021: 81 ML/MIN/1.73
EOSINOPHIL # BLD AUTO: 0.14 10*3/MM3 (ref 0–0.4)
EOSINOPHIL NFR BLD AUTO: 2.2 % (ref 0.3–6.2)
ERYTHROCYTE [DISTWIDTH] IN BLOOD BY AUTOMATED COUNT: 14.1 % (ref 12.3–15.4)
EXPIRATION DATE: ABNORMAL
GLUCOSE SERPL-MCNC: 104 MG/DL (ref 65–99)
GLUCOSE UR STRIP-MCNC: NEGATIVE MG/DL
HCT VFR BLD AUTO: 38.1 % (ref 37.5–51)
HGB BLD-MCNC: 12.4 G/DL (ref 13–17.7)
IMM GRANULOCYTES # BLD AUTO: 0.02 10*3/MM3 (ref 0–0.05)
IMM GRANULOCYTES NFR BLD AUTO: 0.3 % (ref 0–0.5)
KETONES UR QL: NEGATIVE
LEUKOCYTE EST, POC: ABNORMAL
LYMPHOCYTES # BLD AUTO: 0.85 10*3/MM3 (ref 0.7–3.1)
LYMPHOCYTES NFR BLD AUTO: 13.6 % (ref 19.6–45.3)
Lab: ABNORMAL
MCH RBC QN AUTO: 30.4 PG (ref 26.6–33)
MCHC RBC AUTO-ENTMCNC: 32.5 G/DL (ref 31.5–35.7)
MCV RBC AUTO: 93.4 FL (ref 79–97)
MONOCYTES # BLD AUTO: 0.56 10*3/MM3 (ref 0.1–0.9)
MONOCYTES NFR BLD AUTO: 9 % (ref 5–12)
NEUTROPHILS NFR BLD AUTO: 4.64 10*3/MM3 (ref 1.7–7)
NEUTROPHILS NFR BLD AUTO: 74.3 % (ref 42.7–76)
NITRITE UR-MCNC: NEGATIVE MG/ML
NRBC BLD AUTO-RTO: 0 /100 WBC (ref 0–0.2)
PH UR: 6 [PH] (ref 5–8)
PLATELET # BLD AUTO: 163 10*3/MM3 (ref 140–450)
PMV BLD AUTO: 11.1 FL (ref 6–12)
POTASSIUM SERPL-SCNC: 4.1 MMOL/L (ref 3.5–5.2)
PROT UR STRIP-MCNC: ABNORMAL MG/DL
RBC # BLD AUTO: 4.08 10*6/MM3 (ref 4.14–5.8)
RBC # UR STRIP: ABNORMAL /UL
SODIUM SERPL-SCNC: 138 MMOL/L (ref 136–145)
SP GR UR: 1.02 (ref 1–1.03)
UROBILINOGEN UR QL: ABNORMAL
WBC NRBC COR # BLD: 6.25 10*3/MM3 (ref 3.4–10.8)

## 2023-06-06 PROCEDURE — 87186 SC STD MICRODIL/AGAR DIL: CPT | Performed by: NURSE PRACTITIONER

## 2023-06-06 PROCEDURE — 85025 COMPLETE CBC W/AUTO DIFF WBC: CPT | Performed by: NURSE PRACTITIONER

## 2023-06-06 PROCEDURE — 87086 URINE CULTURE/COLONY COUNT: CPT | Performed by: NURSE PRACTITIONER

## 2023-06-06 PROCEDURE — 87077 CULTURE AEROBIC IDENTIFY: CPT | Performed by: NURSE PRACTITIONER

## 2023-06-06 PROCEDURE — 80048 BASIC METABOLIC PNL TOTAL CA: CPT | Performed by: NURSE PRACTITIONER

## 2023-06-06 RX ORDER — NITROFURANTOIN 25; 75 MG/1; MG/1
100 CAPSULE ORAL 2 TIMES DAILY
Qty: 14 CAPSULE | Refills: 0 | Status: SHIPPED | OUTPATIENT
Start: 2023-06-06

## 2023-06-06 RX ORDER — SODIUM CHLORIDE 9 MG/ML
20 INJECTION, SOLUTION INTRAVENOUS CONTINUOUS
Status: CANCELLED | OUTPATIENT
Start: 2023-06-06

## 2023-06-06 RX ORDER — FLUMAZENIL 0.1 MG/ML
0.5 INJECTION INTRAVENOUS ONCE AS NEEDED
Status: CANCELLED | OUTPATIENT
Start: 2023-06-06

## 2023-06-06 RX ORDER — FENTANYL CITRATE 50 UG/ML
50-100 INJECTION, SOLUTION INTRAMUSCULAR; INTRAVENOUS ONCE AS NEEDED
Status: CANCELLED | OUTPATIENT
Start: 2023-06-06

## 2023-06-06 RX ORDER — NALOXONE HCL 0.4 MG/ML
0.4 VIAL (ML) INJECTION ONCE AS NEEDED
Status: CANCELLED | OUTPATIENT
Start: 2023-06-06

## 2023-06-06 RX ORDER — SODIUM CHLORIDE 9 MG/ML
40 INJECTION, SOLUTION INTRAVENOUS AS NEEDED
Status: CANCELLED | OUTPATIENT
Start: 2023-06-06

## 2023-06-06 RX ORDER — SODIUM CHLORIDE 0.9 % (FLUSH) 0.9 %
10 SYRINGE (ML) INJECTION AS NEEDED
Status: CANCELLED | OUTPATIENT
Start: 2023-06-06

## 2023-06-06 RX ORDER — SODIUM CHLORIDE 0.9 % (FLUSH) 0.9 %
10 SYRINGE (ML) INJECTION EVERY 12 HOURS SCHEDULED
Status: CANCELLED | OUTPATIENT
Start: 2023-06-06

## 2023-06-06 RX ORDER — MIDAZOLAM HYDROCHLORIDE 1 MG/ML
2-8 INJECTION INTRAMUSCULAR; INTRAVENOUS ONCE AS NEEDED
Status: CANCELLED | OUTPATIENT
Start: 2023-06-06

## 2023-06-06 RX ORDER — HYDROCHLOROTHIAZIDE 12.5 MG/1
CAPSULE, GELATIN COATED ORAL
Status: ON HOLD | COMMUNITY
End: 2023-06-08

## 2023-06-06 NOTE — TELEPHONE ENCOUNTER
Received chat from Tala  Patient sees infectious disease. Dr. Og tells him to see him if he is having uti. He is having severe lower back /flank pain. Could you call him and see if he needs to come in. His daughter called and they told him to come Thursday. I just want to double check.     Does he need to come earlier? They say he usually orders IV medications.     Urinalysis shows 500 + leukocyes; no nitrites and he is afebrile

## 2023-06-06 NOTE — TELEPHONE ENCOUNTER
Called [facility] at [phone]  Talked to Emily-scheduled him for tomorrow at 10:45-Tala will give him appointment information

## 2023-06-06 NOTE — PROGRESS NOTES
Patient Name: Darien Camarena  : 1935   MRN: [id number]     Chief Complaint:    Chief Complaint   Patient presents with    Flank Pain       History of Present Illness: Darien Camarena is a 87 y.o. male presents to clinic for flank pain. He is accompanied by his daughter.    Flank pain  The patient reports he has some flank pain on the right side. He denies any burning or urgency with urination. He states that he has had issues with his kidneys in the past. He notes he is not having this kind of pain when he is having kidney issues. The patient's daughter reports this is the first time the patient has had kidney issues for a while now. She states when it gets really bad, he is usually hospitalized. She reports that he goes to infectious disease and sees Dr. Og and they were advised that he needs to get to the hospital anytime he has this. The patient denies ever having any kidney stones. His daughter states the patient has never taken antibiotics; he has only done infusions. He has had back pain and a fever in the past, but he does not currently have a fever. The patient reports he was in so much pain last night that he did not think he was going to get from his bathroom to his bed. He notes he could barely walk due to the pain. He states that it went away, and he has not had it happen anymore. He reports he has had it in the same area before but not as bad as it was last night. He states the pain lasted for about one minute.     His daughter reports he has finished with radiation. She states that he had a CT of the chest performed on yesterday 2023. She notes they have an appointment on Monday, 2023 with an oncologist to discuss the results of the CT scan. He denies any fever or chills.    Atrial fibrillation  The patient reports he goes in and out of atrial fibrillation. He denies having symptoms at this time.  He is currently taking Eliquis. He notes he does not feel when his heart it  is in atrial fibrillation.      Subjective     Review of System: Review of Systems     Medications:     Current Outpatient Medications:     albuterol sulfate  (90 Base) MCG/ACT inhaler, Inhale 2 puffs Every 4 (Four) Hours As Needed for Wheezing., Disp: 18 g, Rfl: 5    allopurinol (ZYLOPRIM) 300 MG tablet, Take 1 tablet by mouth Daily., Disp: 90 tablet, Rfl: 1    amiodarone (PACERONE) 200 MG tablet, Take 0.5 tablets by mouth Daily., Disp: 90 tablet, Rfl: 1    apixaban (Eliquis) 5 MG tablet tablet, Take 1 tablet by mouth Every 12 (Twelve) Hours., Disp: 180 tablet, Rfl: 1    Ascorbic Acid (VITAMIN C) 500 MG capsule, Take 1 tablet by mouth 4 (four) times a day., Disp: , Rfl:     docusate sodium (COLACE) 100 MG capsule, Take 3 capsules by mouth Every Night., Disp: , Rfl:     Ferrous Gluconate (IRON) 240 (27 FE) MG tablet, Take 1 tablet by mouth Daily., Disp: , Rfl:     finasteride (PROSCAR) 5 MG tablet, Take 1 tablet by mouth every night at bedtime., Disp: 90 tablet, Rfl: 1    furosemide (Lasix) 20 MG tablet, Take 1 tablet by mouth 2 (Two) Times a Day., Disp: 30 tablet, Rfl: 2    hydroCHLOROthiazide (MICROZIDE) 12.5 MG capsule, HYDROCHLOROTHIAZIDE 12.5 MG CAPS, Disp: , Rfl:     metFORMIN (GLUCOPHAGE) 1000 MG tablet, Take 1 tablet by mouth 2 (Two) Times a Day., Disp: 180 tablet, Rfl: 1    metoprolol tartrate (LOPRESSOR) 100 MG tablet, Take 1 tablet by mouth Every 12 (Twelve) Hours., Disp: 180 tablet, Rfl: 0    omeprazole (priLOSEC) 20 MG capsule, Take 1 capsule by mouth Daily., Disp: 90 capsule, Rfl: 1    potassium chloride (K-DUR,KLOR-CON) 20 MEQ CR tablet, Take 1 tablet by mouth Daily., Disp: 90 tablet, Rfl: 1    pravastatin (PRAVACHOL) 40 MG tablet, Take 1 tablet by mouth Daily., Disp: 90 tablet, Rfl: 1    Probiotic Product (PROBIOTIC ADVANCED PO), Take 1 tablet by mouth 2 (Two) Times a Day., Disp: , Rfl:     sertraline (Zoloft) 50 MG tablet, Take 1 tablet by mouth Daily., Disp: 90 tablet, Rfl: 0    tamsulosin  "(FLOMAX) 0.4 MG capsule 24 hr capsule, Take 1 capsule by mouth Daily., Disp: 90 capsule, Rfl: 1    Tiotropium Bromide Monohydrate (Spiriva Respimat) 1.25 MCG/ACT aerosol solution inhaler, Inhale 2 puffs Daily., Disp: 12 g, Rfl: 1    tiotropium bromide-olodaterol (STIOLTO RESPIMAT) 2.5-2.5 MCG/ACT aerosol solution inhaler, Inhale 2 puffs Daily. 2 inh once a day, Disp: 1 each, Rfl: 3    valsartan (DIOVAN) 80 MG tablet, Take 1 tablet by mouth Daily., Disp: 90 tablet, Rfl: 1    nitrofurantoin, macrocrystal-monohydrate, (Macrobid) 100 MG capsule, Take 1 capsule by mouth 2 (Two) Times a Day., Disp: 14 capsule, Rfl: 0    Current Facility-Administered Medications:     albuterol sulfate HFA (PROVENTIL HFA;VENTOLIN HFA;PROAIR HFA) inhaler 4 puff, 4 puff, Inhalation, Once, Vikram Eagle MD    Allergies:   Allergies   Allergen Reactions    Xarelto [Rivaroxaban] GI Bleeding     GI bleed    Penicillins Hives     Has tolerated cefepime, ceftriaxone       Objective     Physical Exam:   Vital Signs:   Vitals:    06/06/23 0934   BP: (!) 144/106   BP Location: Right arm   Patient Position: Sitting   Cuff Size: Adult   Pulse: 84   Resp: 20   Temp: 98.2 °F (36.8 °C)   TempSrc: Infrared   Weight: 125 kg (275 lb)   Height: 190.5 cm (75\")   PainSc:   2     Body mass index is 34.37 kg/m².        Physical Exam  Constitutional:       General: He is not in acute distress.     Appearance: He is not ill-appearing.   HENT:      Head: Normocephalic.   Cardiovascular:      Rate and Rhythm: Normal rate and regular rhythm.      Heart sounds: Normal heart sounds. No murmur heard.  Pulmonary:      Breath sounds: Normal breath sounds.   Abdominal:      General: Bowel sounds are normal.      Tenderness: There is no abdominal tenderness.   Neurological:      General: No focal deficit present.      Mental Status: He is oriented to person, place, and time.   Psychiatric:         Mood and Affect: Mood normal.     ECG 12 Lead    Date/Time: 6/6/2023 5:31 " PM  Performed by: Tala Carbajal APRN  Authorized by: Tala Carbajal APRN   Comparison: compared with previous ECG from 3/7/2023  Comparison to previous ECG: A-fib replaces sinus bradycardia  Rhythm: atrial fibrillation  Rate: tachycardic  Conduction: conduction normal  QRS axis: normal    Clinical impression: abnormal EKG        Assessment / Plan      Assessment/Plan:   Diagnoses and all orders for this visit:    1. Right flank pain (Primary)    -     Basic metabolic panel; Future  -     CBC w AUTO Differential; Future  -     POC Urinalysis Dipstick, Automated  -     Urine Culture - , Urine, Random Void; Future  -     nitrofurantoin, macrocrystal-monohydrate, (Macrobid) 100 MG capsule; Take 1 capsule by mouth 2 (Two) Times a Day.  Dispense: 14 capsule; Refill: 0  -     Basic metabolic panel  -     CBC w AUTO Differential  -     Urine Culture - Urine, Urine, Random Void    2. Paroxysmal atrial fibrillation  -     ECG 12 Lead    3. Acute cystitis without hematuria  -     nitrofurantoin, macrocrystal-monohydrate, (Macrobid) 100 MG capsule; Take 1 capsule by mouth 2 (Two) Times a Day.  Dispense: 14 capsule; Refill: 0      1. Flank pain/uti  - The patient presents today with flank pain in his right side. Will prescribe an antibiotic and closely monitor his symptoms. He is advised to go to the emergency room if he starts to experience weakness, high fever, worsening pain, or any severe symptoms. Will send urine culture.  Our office called infectious disease and got the patient an appointment scheduled for tomorrow 06/07/2023 for 10:45 AM with Dr. Og.    2. Atrial fibrillation  - The patient is currently in atrial fibrillation. Will order an EKG in-office. He is currently taking Eliquis. He will continue taking current medication.  Call made to cardiology Dr. Oliveros.  He will be scheduled for cardioversion. Family is aware.     Follow Up:   Follow-up with Dr. Way 6/21/2023; patient well follow-up with  cardiology and infectious disease.      BERYL Wilson  [facility]    Transcribed from ambient dictation for BERYL Wilson by Pat Nowak.  06/06/23   13:00 EDT    Patient or patient representative verbalized consent to the visit recording.  I have personally performed the services described in this document as transcribed by the above individual, and it is both accurate and complete.

## 2023-06-07 ENCOUNTER — TRANSCRIBE ORDERS (OUTPATIENT)
Dept: ADMINISTRATIVE | Facility: HOSPITAL | Age: 88
End: 2023-06-07
Payer: MEDICARE

## 2023-06-07 DIAGNOSIS — N39.0 URINARY TRACT INFECTION WITHOUT HEMATURIA, SITE UNSPECIFIED: Primary | ICD-10-CM

## 2023-06-08 ENCOUNTER — HOSPITAL ENCOUNTER (OUTPATIENT)
Dept: CARDIOLOGY | Facility: HOSPITAL | Age: 88
Discharge: HOME | End: 2023-06-08
Attending: INTERNAL MEDICINE | Admitting: INTERNAL MEDICINE
Payer: MEDICARE

## 2023-06-08 ENCOUNTER — HOSPITAL ENCOUNTER (OUTPATIENT)
Dept: CT IMAGING | Facility: HOSPITAL | Age: 88
Discharge: HOME | End: 2023-06-08
Payer: MEDICARE

## 2023-06-08 VITALS
DIASTOLIC BLOOD PRESSURE: 74 MMHG | WEIGHT: 271.17 LBS | HEART RATE: 74 BPM | OXYGEN SATURATION: 96 % | BODY MASS INDEX: 33.72 KG/M2 | TEMPERATURE: 97.2 F | RESPIRATION RATE: 16 BRPM | SYSTOLIC BLOOD PRESSURE: 131 MMHG | HEIGHT: 75 IN

## 2023-06-08 DIAGNOSIS — I48.19 ATRIAL FIBRILLATION, PERSISTENT: ICD-10-CM

## 2023-06-08 DIAGNOSIS — I48.0 PAROXYSMAL ATRIAL FIBRILLATION: Chronic | ICD-10-CM

## 2023-06-08 DIAGNOSIS — N39.0 URINARY TRACT INFECTION WITHOUT HEMATURIA, SITE UNSPECIFIED: ICD-10-CM

## 2023-06-08 LAB
GLUCOSE BLDC GLUCOMTR-MCNC: 148 MG/DL (ref 70–130)
QT INTERVAL: 438 MS
QTC INTERVAL: 429 MS

## 2023-06-08 PROCEDURE — 36415 COLL VENOUS BLD VENIPUNCTURE: CPT

## 2023-06-08 PROCEDURE — 92960 CARDIOVERSION ELECTRIC EXT: CPT

## 2023-06-08 PROCEDURE — 92960 CARDIOVERSION ELECTRIC EXT: CPT | Performed by: INTERNAL MEDICINE

## 2023-06-08 PROCEDURE — S0260 H&P FOR SURGERY: HCPCS | Performed by: INTERNAL MEDICINE

## 2023-06-08 PROCEDURE — 93005 ELECTROCARDIOGRAM TRACING: CPT | Performed by: INTERNAL MEDICINE

## 2023-06-08 PROCEDURE — 82948 REAGENT STRIP/BLOOD GLUCOSE: CPT

## 2023-06-08 PROCEDURE — 74176 CT ABD & PELVIS W/O CONTRAST: CPT

## 2023-06-08 RX ORDER — SODIUM CHLORIDE 9 MG/ML
20 INJECTION, SOLUTION INTRAVENOUS CONTINUOUS
Status: DISCONTINUED | OUTPATIENT
Start: 2023-06-08 | End: 2023-06-08 | Stop reason: HOSPADM

## 2023-06-08 RX ORDER — METOPROLOL TARTRATE 50 MG/1
50 TABLET, FILM COATED ORAL 2 TIMES DAILY
Status: ON HOLD | COMMUNITY
End: 2023-06-08

## 2023-06-08 RX ORDER — METOPROLOL TARTRATE 100 MG/1
50 TABLET ORAL 2 TIMES DAILY
COMMUNITY

## 2023-06-08 RX ORDER — SODIUM CHLORIDE 0.9 % (FLUSH) 0.9 %
10 SYRINGE (ML) INJECTION AS NEEDED
Status: DISCONTINUED | OUTPATIENT
Start: 2023-06-08 | End: 2023-06-08 | Stop reason: HOSPADM

## 2023-06-08 RX ORDER — MIDAZOLAM HYDROCHLORIDE 1 MG/ML
2-8 INJECTION INTRAMUSCULAR; INTRAVENOUS ONCE AS NEEDED
Status: DISCONTINUED | OUTPATIENT
Start: 2023-06-08 | End: 2023-06-08

## 2023-06-08 RX ORDER — FLUMAZENIL 0.1 MG/ML
0.5 INJECTION INTRAVENOUS ONCE AS NEEDED
Status: DISCONTINUED | OUTPATIENT
Start: 2023-06-08 | End: 2023-06-08 | Stop reason: HOSPADM

## 2023-06-08 RX ORDER — SODIUM CHLORIDE 9 MG/ML
40 INJECTION, SOLUTION INTRAVENOUS AS NEEDED
Status: DISCONTINUED | OUTPATIENT
Start: 2023-06-08 | End: 2023-06-08 | Stop reason: HOSPADM

## 2023-06-08 RX ORDER — SODIUM CHLORIDE 0.9 % (FLUSH) 0.9 %
10 SYRINGE (ML) INJECTION EVERY 12 HOURS SCHEDULED
Status: DISCONTINUED | OUTPATIENT
Start: 2023-06-08 | End: 2023-06-08 | Stop reason: HOSPADM

## 2023-06-08 RX ORDER — NALOXONE HCL 0.4 MG/ML
0.4 VIAL (ML) INJECTION ONCE AS NEEDED
Status: DISCONTINUED | OUTPATIENT
Start: 2023-06-08 | End: 2023-06-08 | Stop reason: HOSPADM

## 2023-06-08 RX ORDER — FENTANYL CITRATE 50 UG/ML
50-100 INJECTION, SOLUTION INTRAMUSCULAR; INTRAVENOUS ONCE AS NEEDED
Status: DISCONTINUED | OUTPATIENT
Start: 2023-06-08 | End: 2023-06-08 | Stop reason: HOSPADM

## 2023-06-08 RX ORDER — MIDAZOLAM HYDROCHLORIDE 1 MG/ML
2-20 INJECTION INTRAMUSCULAR; INTRAVENOUS ONCE AS NEEDED
Status: DISCONTINUED | OUTPATIENT
Start: 2023-06-08 | End: 2023-06-08 | Stop reason: HOSPADM

## 2023-06-08 NOTE — H&P
Pre-cardiac Procedure History and Physical  ECV  [facility]      Patient:  Darien Camarena  :  1935  MRN: [id number]    PCP:  Pito Way MD  PHONE:  [phone]    DATE: 2023  ID: Darien Camarena is a 87 y.o. male from [place], [place]    CC: Paroxysmal Atrial Fibrillation    PROBLEM LIST:   Active Hospital Problems    Diagnosis  POA    **Paroxysmal atrial fibrillation [I48.0]  Yes     Priority: High     Diagnosed 2012.   FARHAD-guided ECV, 2012  CHADS-VASc 5 (age > 75, CAD, HTN, DM)  Successful external cardioversion for asymptomatic atrial fibrillation with RVR, 10/25/18  Successful cardioversion for atrial fibrillation RVR, 3/6/19.  Sotalol increased  Minimally symptomatic atrial fibrillation with cardioversion and the ER, 10/31/2019  ED cardioversion for A. fib with RVR, 2020  ED cardioversion for asymptomatic A. fib with RVR, 2020   ED cardioversion for asymptomatic A. 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  LVEF 56%.  Coronary calcium noted.  Cardiac catheterization (2022): Mild CAD.  Normal LV filling pressure      Long term current use of antiarrhythmic medical therapy [Z79.899]  Not Applicable    Stage 3 chronic kidney disease (HCC) [N18.30]  Yes    Essential hypertension [I10]  Yes     Target blood pressure <130/80 mmHg      Hyperlipidemia LDL goal <100 [E78.5]  Yes     Moderate intensity statin therapy reasonable due to diabetic status      Obstructive  sleep apnea syndrome [G47.33]  Yes     Compliant with CPAP           BRIEF HPI: Mr. Camarena is a 88 y/o male with the above cardiac history who presents to [facility] today for external cardioversion.  He recently presented to his PCP for flank pain and was found to be in atrial fibrillation.  He was most recently cardioverted on 8/5/2022 with successful conversion to sinus rhythm. He can not usually tell when he goes out of rhythm. He does endorse shortness of breath recently but no worse than his usual baseline with COPD. He denies chest pain, palpitations, lightheadedness or syncopal episode. He is following with Dr. Og for his UTI and flank pain.         Allergies:      Allergies   Allergen Reactions    Xarelto [Rivaroxaban] GI Bleeding     GI bleed    Penicillins Hives     Has tolerated cefepime, ceftriaxone       MEDICATIONS:  Current Outpatient Medications   Medication Instructions    albuterol sulfate  (90 Base) MCG/ACT inhaler 2 puffs, Inhalation, Every 4 Hours PRN    allopurinol (ZYLOPRIM) 300 mg, Oral, Daily    amiodarone (PACERONE) 100 mg, Oral, Daily    apixaban (ELIQUIS) 5 mg, Oral, Every 12 Hours    Ascorbic Acid (VITAMIN C) 500 MG capsule 1 tablet, Oral, 4 Times Daily    docusate sodium (COLACE) 200 mg, Oral, Nightly PRN    Ferrous Gluconate (IRON) 240 (27 FE) MG tablet 1 tablet, Oral, Daily    finasteride (PROSCAR) 5 mg, Oral, Every Night at Bedtime    furosemide (LASIX) 20 mg, Oral, 2 Times Daily    metFORMIN (GLUCOPHAGE) 1,000 mg, Oral, 2 Times Daily    metoprolol tartrate (LOPRESSOR) 50 mg, Oral, 2 Times Daily    nitrofurantoin (macrocrystal-monohydrate) (MACROBID) 100 mg, Oral, 2 Times Daily    omeprazole (PRILOSEC) 20 mg, Oral, Daily    potassium chloride (K-DUR,KLOR-CON) 20 MEQ CR tablet 20 mEq, Oral, Daily    pravastatin (PRAVACHOL) 40 mg, Oral, Daily    Probiotic Product (PROBIOTIC ADVANCED PO) 1 tablet, Oral, 2 Times Daily    sertraline (ZOLOFT) 50 mg, Oral, Daily    tamsulosin (FLOMAX) 0.4  "mg, Oral, Daily    Tiotropium Bromide Monohydrate (Spiriva Respimat) 1.25 MCG/ACT aerosol solution inhaler 2 puffs, Inhalation, Daily - RT    tiotropium bromide-olodaterol (STIOLTO RESPIMAT) 2.5-2.5 MCG/ACT aerosol solution inhaler 2 puffs, Inhalation, Daily, 2 inh once a day    valsartan (DIOVAN) 80 mg, Oral, Daily       Past medical & surgical history, social and family history reviewed in the electronic medical record.    ROS: Pertinent positives listed in the HPI and problem list above. All others reviewed and negative.     Physical Exam:   BP (!) 134/112 (BP Location: Left arm, Patient Position: Lying)   Pulse 113   Temp 97.2 °F (36.2 °C) (Tympanic)   Resp 16   Ht 190.5 cm (75\")   Wt 123 kg (271 lb 2.7 oz)   SpO2 92%   BMI 33.89 kg/m²     Constitutional:    Well-appearing 87 y.o. y/o adult in no acute distress        Heart:    Regular rhythm and normal rate, no murmurs, rubs or gallops   Lungs:     Clear to auscultation bilaterally, no wheezes, rhales or rhonchi, nonlabored respirations   Abdomen:     Soft, nontender   Extremities:   No gross deformities, trace pedal/pretibial edema   Pulses:    Neuro:  Psych:   Radial and PT pulses palpable and equal bilaterally.  No gross focal deficits  Mood and behavior appropriate for situation       Labs and Diagnostic Data:  Results from last 7 days   Lab Units 06/06/23  1050   SODIUM mmol/L 138   POTASSIUM mmol/L 4.1   CHLORIDE mmol/L 99   CO2 mmol/L 30.1*   BUN mg/dL 18   CREATININE mg/dL 0.92   GLUCOSE mg/dL 104*   CALCIUM mg/dL 9.1     Results from last 7 days   Lab Units 06/06/23  1050   WBC 10*3/mm3 6.25   HEMOGLOBIN g/dL 12.4*   HEMATOCRIT % 38.1   PLATELETS 10*3/mm3 163     Lab Results   Component Value Date    CHOL 87 01/24/2023    TRIG 59 01/24/2023    HDL 38 (L) 01/24/2023    LDL 35 01/24/2023    AST 18 01/24/2023    ALT 16 01/24/2023                   Tele: atrial fibrillation, rates     IMPRESSION:  Mr. Camarena is a 87-year-old male with a history " of paroxysmal atrial fibrillation s/p numerous ECV's, mild CAD on C, hypertension, dyslipidemia, type 2 diabetes, STEVEN and left lower lobe lung cancer who presents today for ECV after being incidentally found to be in A-fib on EKG with PCP.  On amiodarone 100 mg daily and Eliquis  No missed doses of Eliquis in last 30 days according to the patient    PLAN:  Procedure to perform: ECV Risks, benefits and alternatives to the procedure explained to the patient and he understands and wishes to proceed.     Scribed by Harley Jasso PA-C for Dr. Taurus Oliveros MD on 6/8/2023

## 2023-06-09 LAB — BACTERIA SPEC AEROBE CULT: ABNORMAL

## 2023-06-12 ENCOUNTER — OFFICE VISIT (OUTPATIENT)
Dept: RADIATION ONCOLOGY | Facility: HOSPITAL | Age: 88
End: 2023-06-12
Payer: MEDICARE

## 2023-06-12 VITALS
RESPIRATION RATE: 16 BRPM | TEMPERATURE: 97 F | HEART RATE: 72 BPM | OXYGEN SATURATION: 90 % | DIASTOLIC BLOOD PRESSURE: 96 MMHG | SYSTOLIC BLOOD PRESSURE: 183 MMHG | BODY MASS INDEX: 33.92 KG/M2 | WEIGHT: 271.4 LBS

## 2023-06-12 DIAGNOSIS — C34.32 MALIGNANT NEOPLASM OF LOWER LOBE OF LEFT LUNG: Primary | ICD-10-CM

## 2023-06-12 PROCEDURE — G0463 HOSPITAL OUTPT CLINIC VISIT: HCPCS

## 2023-06-12 NOTE — PROGRESS NOTES
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 male with multiple medical comorbidities including COPD, A. Fib on Eliquis, hypertension, CKD, and remote tobacco abuse who was incidentally found to have a 2.2 cm left lower lobe pulmonary nodule on CT scan of the chest for work-up of acute hypoxic respiratory failure secondary to Influenza A infection.  The patient was sent for outpatient PET scan, showing the subsolid left lower lobe nodule to be mildly hypermetabolic and concerning for primary malignancy.  There was otherwise no evidence of hypermetabolic hilar/mediastinal lymphadenopathy or distant metastatic disease.  The patient was uninterested in risks associated with biopsy.  The patient was not considered a candidate for surgery.  The patient underwent definitive empiric treatment with a course of SBRT consisting of 50 Gy in 5 fractions delievered on the Eigenta Radixact.  He completed treatments 2023.         The patient is doing well.  He reports that he has had some illnesses that he has been dealing with but is started to gain weight again and is recovering adequately.      MEDICATIONS: Medication reconciliation for the patient was reviewed and confirmed in the electronic medical record.    Review of Systems   Respiratory:  Positive for shortness of breath (with exertion).    Cardiovascular:  Positive for leg swelling (improved).   All other systems reviewed and are negative.    Karnofsky score: 80   KPS 80%        Physical Exam  Vitals and nursing note reviewed.   Constitutional:       General: He is  not in acute distress.     Appearance: Normal appearance. He is well-developed.   HENT:      Head: Normocephalic and atraumatic.   Eyes:      Conjunctiva/sclera: Conjunctivae normal.      Pupils: Pupils are equal, round, and reactive to light.   Cardiovascular:      Rate and Rhythm: Normal rate and regular rhythm.      Heart sounds: No murmur heard.    No friction rub.   Pulmonary:      Effort: Pulmonary effort is normal.      Breath sounds: Normal breath sounds. No wheezing.   Chest:      Chest wall: No tenderness.   Musculoskeletal:         General: Normal range of motion.      Cervical back: Normal range of motion and neck supple.   Lymphadenopathy:      Cervical: No cervical adenopathy.   Skin:     General: Skin is warm and dry.   Neurological:      Mental Status: He is alert and oriented to person, place, and time.   Psychiatric:         Behavior: Behavior normal.         Thought Content: Thought content normal.         Judgment: Judgment normal.       VITAL SIGNS:   Vitals:    06/12/23 1131   BP: (!) 183/96   Pulse: 72   Resp: 16   Temp: 97 °F (36.1 °C)   TempSrc: Temporal   SpO2: 90%   Weight: 123 kg (271 lb 6.4 oz)   PainSc: 0-No pain           The following portions of the patient's history were reviewed and updated as appropriate: allergies, current medications, past family history, past medical history, past social history, past surgical history and problem list.         There are no diagnoses linked to this encounter.       IMPRESSION:  Darien Camarena is an 87 y.o. gentleman with suspected early stage primary bronchogenic carcinoma of the left lower lobe of lung.  The patient was uninterested in biopsy.  The patient underwent definitive, empiric SBRT to this lesion.    The patient is doing well.  Recommend the patient undergo continued surveillance given that he has had response but not complete 1.  Recommend the patient return in 4 months for a repeat CT scan of the chest.      RECOMMENDATIONS:    Left  lower lobe primary bronchogenic carcinoma  -cT1b N0 M0   -Radiographic diagnosis  -Hypermetabolic on PET scan  -No evidence of regional or distant metastatic disease  -Patient uninterested in biopsy  -Status post definitive empiric SBRT on the Radixact, 50 Gy in 5 fractions   -completed 2/24/2023  -CT scan shows no evidence of disease progression with interval treatment response.  -Clinic follow-up with repeat CT scan in 4 months    COPD  -Continues daily Stilolto, albuterol as needed  -Follows with Dr. Eagle    No follow-ups on file.    Abbe Leary MD      I spent a total of 30 minutes on today's visit, with more than 15 minutes in direct face to face communication, and the remainder of the time spent in reviewing the relevant history, records, available imaging, and for documentation.

## 2023-06-14 ENCOUNTER — OFFICE VISIT (OUTPATIENT)
Dept: PULMONOLOGY | Facility: CLINIC | Age: 88
End: 2023-06-14
Payer: MEDICARE

## 2023-06-14 VITALS
HEART RATE: 71 BPM | BODY MASS INDEX: 33.55 KG/M2 | TEMPERATURE: 96.7 F | OXYGEN SATURATION: 91 % | SYSTOLIC BLOOD PRESSURE: 120 MMHG | DIASTOLIC BLOOD PRESSURE: 82 MMHG | HEIGHT: 75 IN | WEIGHT: 269.8 LBS

## 2023-06-14 DIAGNOSIS — J42 CHRONIC BRONCHITIS, UNSPECIFIED CHRONIC BRONCHITIS TYPE: Primary | ICD-10-CM

## 2023-06-14 NOTE — PROGRESS NOTES
PULMONARY FOLLOW-UP OUTPATIENT NOTE:     Patient ID/Chief Complaint: .Darien Camarena is a 87 y.o. male presenting for follow up on lung nodule, COPD.     History of Present Illness: Patient Isidro is an 87 year old male with past medical history of hypertension, CAD, atrial fibrillation, HLD and STEVEN.  He was first evaluated in pulmonary clinic on 1/10/2023 following a recent hospitalization for acute hypoxic respiratory failure secondary to influenza A in December 2022.  Thoracic imaging during hospitalization revealed an incidental left lower lobe pulmonary nodule. This was followed up with a PET scan (1/04/2023) demonstrating avidity.  After much deliberation on work-up and treatment of left lower lobe nodule (ie bronchoscopy, serial imaging, empiric radiation)-- the decision was made to pursue SBRT which was completed in Feburary 2023.     At baseline patient noted exertional dyspnea.  Pulmonary function testing at outpatient follow-up with severe obstructive lung disease.  Patient started on an inhaler regimen which has overall improved quality of life and exercise tolerance.  He denies chest pain, cough, fever, chills, lymphadenopathy, night sweats, weight loss, nausea, vomiting, numbness/tingling in arms/neck, syncope and presyncope.    PFSH: Reviewed in EMR, Significant Changes Noted Above    Review of Systems: Fourteen point review of systems performed and negative except for those issues listed in HPI    Medications:  Current Outpatient Medications   Medication Sig Dispense Refill   • albuterol sulfate  (90 Base) MCG/ACT inhaler Inhale 2 puffs Every 4 (Four) Hours As Needed for Wheezing. 18 g 5   • allopurinol (ZYLOPRIM) 300 MG tablet Take 1 tablet by mouth Daily. 90 tablet 1   • amiodarone (PACERONE) 200 MG tablet Take 0.5 tablets by mouth Daily. 90 tablet 1   • apixaban (Eliquis) 5 MG tablet tablet Take 1 tablet by mouth Every 12 (Twelve) Hours. 180 tablet 1   • Ascorbic Acid (VITAMIN C) 500 MG  capsule Take 1 tablet by mouth 4 (four) times a day.     • docusate sodium (COLACE) 100 MG capsule Take 2 capsules by mouth At Night As Needed.     • Ferrous Gluconate (IRON) 240 (27 FE) MG tablet Take 1 tablet by mouth Daily.     • finasteride (PROSCAR) 5 MG tablet Take 1 tablet by mouth every night at bedtime. 90 tablet 1   • furosemide (Lasix) 20 MG tablet Take 1 tablet by mouth 2 (Two) Times a Day. 30 tablet 2   • metFORMIN (GLUCOPHAGE) 1000 MG tablet Take 1 tablet by mouth 2 (Two) Times a Day. 180 tablet 1   • metoprolol tartrate (LOPRESSOR) 100 MG tablet Take 50 mg by mouth 2 (Two) Times a Day.     • nitrofurantoin, macrocrystal-monohydrate, (Macrobid) 100 MG capsule Take 1 capsule by mouth 2 (Two) Times a Day. 14 capsule 0   • omeprazole (priLOSEC) 20 MG capsule Take 1 capsule by mouth Daily. 90 capsule 1   • potassium chloride (K-DUR,KLOR-CON) 20 MEQ CR tablet Take 1 tablet by mouth Daily. 90 tablet 1   • pravastatin (PRAVACHOL) 40 MG tablet Take 1 tablet by mouth Daily. 90 tablet 1   • Probiotic Product (PROBIOTIC ADVANCED PO) Take 1 tablet by mouth 2 (Two) Times a Day.     • sertraline (Zoloft) 50 MG tablet Take 1 tablet by mouth Daily. 90 tablet 0   • tamsulosin (FLOMAX) 0.4 MG capsule 24 hr capsule Take 1 capsule by mouth Daily. 90 capsule 1   • Tiotropium Bromide Monohydrate (Spiriva Respimat) 1.25 MCG/ACT aerosol solution inhaler Inhale 2 puffs Daily. 12 g 1   • tiotropium bromide-olodaterol (STIOLTO RESPIMAT) 2.5-2.5 MCG/ACT aerosol solution inhaler Inhale 2 puffs Daily. 2 inh once a day 1 each 3   • valsartan (DIOVAN) 80 MG tablet Take 1 tablet by mouth Daily. 90 tablet 1     Current Facility-Administered Medications   Medication Dose Route Frequency Provider Last Rate Last Admin   • albuterol sulfate HFA (PROVENTIL HFA;VENTOLIN HFA;PROAIR HFA) inhaler 4 puff  4 puff Inhalation Once Vikram Eagle MD         Immunizations:  Chart reviewed: immunizations are up to date and documented.  Immunization  History   Administered Date(s) Administered   • COVID-19 (PFIZER) BIVALENT 12+YRS 12/22/2022   • COVID-19 (PFIZER) Purple Cap Monovalent 01/26/2021, 02/19/2021   • Fluzone High Dose =>65 Years ([facility] ONLY) 10/01/2016, 09/18/2017, 09/15/2018, 10/12/2019, 09/18/2020, 09/25/2021   • Fluzone High-Dose 65+yrs 09/19/2020, 09/25/2021, 12/22/2022   • Hepatitis A 09/15/2018, 11/01/2018   • Pneumococcal Conjugate 13-Valent (PCV13) 10/26/2015   • Pneumococcal Polysaccharide (PPSV23) 06/24/2006, 09/18/2020   • Pneumococcal, Unspecified 11/30/2006   • Shingrix 09/25/2021, 04/15/2023   • Td 06/24/2005   • Tdap 06/14/2018       Physical Examination:  Vitals:    06/14/23 0832   BP: 120/82   Pulse: 71   Temp: 96.7 °F (35.9 °C)   SpO2: 91%     General: The patient appears in no acute distress.  Alert, cooperative and interactive.   HEENT: NC/AT, PERRL, Normal nasal mucosa.  Neck: Trachea midline, No masses.  Chest: Clear to auscultation bilaterally, No wheezing, rhonchi, or rales. Normal work of breathing. Equal chest rise. On room air with appropriate oxygen saturations.   Cardiac: Regular rhythm, normal rate, S1S2 auscultated. No murmurs, rubs or gallops.   Extremities: Nonpitting lower extremity edema. No clubbing or cyanosis.  Skin: No rashes, open wounds, or bruising. Warm, dry, well-perfused.  Neuro: Motor power grossly intact bilaterally. Speech fluid and fluent.  Thought process coherent.  Psych: Alert and oriented x3. Mood stable.    Review of Data:  - Laboratory Studies: I personally reviewed recent lab work in EMR  - Radiology Results: I have personally reviewed and interpreted the images in EMR    PET (1/04/2023):  Radiology Impression:   The subsolid left lower lobe nodule is mildly hypermetabolic, highly concerning for primary malignancy. Tissue sampling is recommended. No enlarged or hypermetabolic hilar or mediastinal lymph nodes to suggest prabhu metastases at this time. No evidence   of distant metastatic  disease.    CT Chest w/o Contrast (6/05/2023):  Radiology Impression  1.Mildly decreased size of solid left lower lobe nodule, previously subsolid.  2.New linear opacities in the left lower lobe and lingula, consistent with post-treatment changes.    PFT (1/24/2023): Personally reviewed/interpreted study.  There is severe airflow obstruction (FEV1 43% predicted).  There is a significant response in FVC to a one-time dose of beta agonist bronchodilators (14%). There is concomitant severe restriction.  The diffusing capacity, uncorrected for hemoglobin, is mildly reduced.    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  Personally reviewed recent CTA chest as well as PET scan.  Compared with prior CTA from June 2022 where opacities in the area of question make it difficult to appreciate presence of nodule. Nevertheless, CTA from 12/12/2022 shows a subsolid nodule in patient's left lower lobe with an increasing solid component.  Follow-up PET scan on 1/04/2023 with avidity.  Radiology concern for primary malignancy, likely primary bronchogenic carcinoma. Now s/p empiric radiation (completed February 2023).  Continues to follow with radiation oncology with imaging repeated on 6/05/2023 which revealed postradiation changes as well as decreased size of left lower lobe nodule.  Repeat study in 3-4x months.    - Continue Stiolto (LABA/Anticholinergic); Will also continue as needed albuterol.  Spent approximately 10x minutes on inhaler management and technique and advised spacer    - Patient recently cardioverted and believes that heart rate may be contributing to dyspnea.  Discussed the patient multiple reasons for dyspnea including severe obstructive lung disease.  From a pulmonary perspective we will optimize the inhaler regimen but recommended that as an  adjuvant patient should monitor heart rate, blood pressure and pulse ox at home (especially during episodes of dyspnea)    - LE edema has improved since restarting lasix    - F/U in 4 months (after radiation oncology follow-up and repeat imaging); Earlier if needed    - To receive spirometry and multi ox on return visit    -- Jose Eagle MD   Pulmonary/Critical Care    Level of service justified based on 35 minutes spent in patient care on this date of service including, but not limited to: preparing to see the patient, obtaining and/or reviewing history, performing medically appropriate examination, ordering tests/medicine/procedures, independently interpreting results, documenting clinical information in EHR, and counseling/education of patient/family/caregiver (excluding time spent on other separate services such as performing procedures or test interpretation, if applicable). (Level 4 30-39 minutes; Level 5 40-54 minutes)

## 2023-06-22 LAB
QT INTERVAL: 438 MS
QTC INTERVAL: 429 MS

## 2023-08-09 DIAGNOSIS — I48.0 PAROXYSMAL ATRIAL FIBRILLATION: ICD-10-CM

## 2023-08-09 RX ORDER — APIXABAN 5 MG/1
TABLET, FILM COATED ORAL
Qty: 180 TABLET | Refills: 3 | Status: SHIPPED | OUTPATIENT
Start: 2023-08-09

## 2023-08-18 ENCOUNTER — TRANSCRIBE ORDERS (OUTPATIENT)
Dept: LAB | Facility: HOSPITAL | Age: 88
End: 2023-08-18
Payer: MEDICARE

## 2023-08-18 ENCOUNTER — LAB (OUTPATIENT)
Dept: LAB | Facility: HOSPITAL | Age: 88
End: 2023-08-18
Payer: MEDICARE

## 2023-08-18 DIAGNOSIS — N39.0 URINARY TRACT INFECTION WITHOUT HEMATURIA, SITE UNSPECIFIED: ICD-10-CM

## 2023-08-18 DIAGNOSIS — N10 ACUTE PYELONEPHRITIS WITHOUT LESION OF RENAL MEDULLARY NECROSIS: ICD-10-CM

## 2023-08-18 DIAGNOSIS — N93.0 POSTCOITAL BLEEDING: Primary | ICD-10-CM

## 2023-08-18 DIAGNOSIS — N93.0 POSTCOITAL BLEEDING: ICD-10-CM

## 2023-08-18 PROCEDURE — 87086 URINE CULTURE/COLONY COUNT: CPT

## 2023-08-18 PROCEDURE — 81001 URINALYSIS AUTO W/SCOPE: CPT

## 2023-08-18 PROCEDURE — 87186 SC STD MICRODIL/AGAR DIL: CPT

## 2023-08-18 PROCEDURE — 87077 CULTURE AEROBIC IDENTIFY: CPT

## 2023-08-19 LAB
BACTERIA UR QL AUTO: ABNORMAL /HPF
BILIRUB UR QL STRIP: NEGATIVE
CLARITY UR: ABNORMAL
COLOR UR: ABNORMAL
GLUCOSE UR STRIP-MCNC: NEGATIVE MG/DL
HGB UR QL STRIP.AUTO: ABNORMAL
HYALINE CASTS UR QL AUTO: ABNORMAL /LPF
KETONES UR QL STRIP: NEGATIVE
LEUKOCYTE ESTERASE UR QL STRIP.AUTO: ABNORMAL
NITRITE UR QL STRIP: POSITIVE
PH UR STRIP.AUTO: 6.5 [PH] (ref 5–8)
PROT UR QL STRIP: ABNORMAL
RBC # UR STRIP: ABNORMAL /HPF
REF LAB TEST METHOD: ABNORMAL
SP GR UR STRIP: 1.02 (ref 1–1.03)
SQUAMOUS #/AREA URNS HPF: ABNORMAL /HPF
UROBILINOGEN UR QL STRIP: ABNORMAL
WBC # UR STRIP: ABNORMAL /HPF

## 2023-08-20 LAB — BACTERIA SPEC AEROBE CULT: ABNORMAL

## 2023-08-22 ENCOUNTER — LAB (OUTPATIENT)
Dept: LAB | Facility: HOSPITAL | Age: 88
End: 2023-08-22
Payer: MEDICARE

## 2023-08-22 ENCOUNTER — TRANSCRIBE ORDERS (OUTPATIENT)
Dept: LAB | Facility: HOSPITAL | Age: 88
End: 2023-08-22
Payer: MEDICARE

## 2023-08-22 DIAGNOSIS — N13.6 HYDRONEPHROSIS WITH INFECTION: ICD-10-CM

## 2023-08-22 DIAGNOSIS — N39.0 URINARY TRACT INFECTION WITHOUT HEMATURIA, SITE UNSPECIFIED: Primary | ICD-10-CM

## 2023-08-22 DIAGNOSIS — N10 PYELONEPHRITIS, ACUTE: ICD-10-CM

## 2023-08-22 DIAGNOSIS — N39.0 URINARY TRACT INFECTION WITHOUT HEMATURIA, SITE UNSPECIFIED: ICD-10-CM

## 2023-08-22 LAB
ALBUMIN SERPL-MCNC: 3.3 G/DL (ref 3.5–5.2)
ALBUMIN/GLOB SERPL: 0.9 G/DL
ALP SERPL-CCNC: 115 U/L (ref 39–117)
ALT SERPL W P-5'-P-CCNC: 17 U/L (ref 1–41)
ANION GAP SERPL CALCULATED.3IONS-SCNC: 8 MMOL/L (ref 5–15)
AST SERPL-CCNC: 24 U/L (ref 1–40)
BASOPHILS # BLD AUTO: 0.04 10*3/MM3 (ref 0–0.2)
BASOPHILS NFR BLD AUTO: 0.6 % (ref 0–1.5)
BILIRUB SERPL-MCNC: 0.5 MG/DL (ref 0–1.2)
BUN SERPL-MCNC: 20 MG/DL (ref 8–23)
BUN/CREAT SERPL: 20 (ref 7–25)
CALCIUM SPEC-SCNC: 9 MG/DL (ref 8.6–10.5)
CHLORIDE SERPL-SCNC: 102 MMOL/L (ref 98–107)
CO2 SERPL-SCNC: 29 MMOL/L (ref 22–29)
CREAT SERPL-MCNC: 1 MG/DL (ref 0.76–1.27)
CRP SERPL-MCNC: 2.83 MG/DL (ref 0–0.5)
DEPRECATED RDW RBC AUTO: 55.1 FL (ref 37–54)
EGFRCR SERPLBLD CKD-EPI 2021: 73.3 ML/MIN/1.73
EOSINOPHIL # BLD AUTO: 0.27 10*3/MM3 (ref 0–0.4)
EOSINOPHIL NFR BLD AUTO: 4.3 % (ref 0.3–6.2)
ERYTHROCYTE [DISTWIDTH] IN BLOOD BY AUTOMATED COUNT: 15.8 % (ref 12.3–15.4)
ERYTHROCYTE [SEDIMENTATION RATE] IN BLOOD: 88 MM/HR (ref 0–20)
GLOBULIN UR ELPH-MCNC: 3.8 GM/DL
GLUCOSE SERPL-MCNC: 109 MG/DL (ref 65–99)
HCT VFR BLD AUTO: 39.3 % (ref 37.5–51)
HGB BLD-MCNC: 12.4 G/DL (ref 13–17.7)
IMM GRANULOCYTES # BLD AUTO: 0.03 10*3/MM3 (ref 0–0.05)
IMM GRANULOCYTES NFR BLD AUTO: 0.5 % (ref 0–0.5)
LYMPHOCYTES # BLD AUTO: 1.07 10*3/MM3 (ref 0.7–3.1)
LYMPHOCYTES NFR BLD AUTO: 17.2 % (ref 19.6–45.3)
MCH RBC QN AUTO: 30.2 PG (ref 26.6–33)
MCHC RBC AUTO-ENTMCNC: 31.6 G/DL (ref 31.5–35.7)
MCV RBC AUTO: 95.6 FL (ref 79–97)
MONOCYTES # BLD AUTO: 0.48 10*3/MM3 (ref 0.1–0.9)
MONOCYTES NFR BLD AUTO: 7.7 % (ref 5–12)
NEUTROPHILS NFR BLD AUTO: 4.32 10*3/MM3 (ref 1.7–7)
NEUTROPHILS NFR BLD AUTO: 69.7 % (ref 42.7–76)
NRBC BLD AUTO-RTO: 0 /100 WBC (ref 0–0.2)
PLATELET # BLD AUTO: 172 10*3/MM3 (ref 140–450)
PMV BLD AUTO: 9.6 FL (ref 6–12)
POTASSIUM SERPL-SCNC: 4.7 MMOL/L (ref 3.5–5.2)
PROT SERPL-MCNC: 7.1 G/DL (ref 6–8.5)
RBC # BLD AUTO: 4.11 10*6/MM3 (ref 4.14–5.8)
SODIUM SERPL-SCNC: 139 MMOL/L (ref 136–145)
WBC NRBC COR # BLD: 6.21 10*3/MM3 (ref 3.4–10.8)

## 2023-08-22 PROCEDURE — 86140 C-REACTIVE PROTEIN: CPT

## 2023-08-22 PROCEDURE — 85652 RBC SED RATE AUTOMATED: CPT

## 2023-08-22 PROCEDURE — 36415 COLL VENOUS BLD VENIPUNCTURE: CPT

## 2023-08-22 PROCEDURE — 85025 COMPLETE CBC W/AUTO DIFF WBC: CPT

## 2023-08-22 PROCEDURE — 80053 COMPREHEN METABOLIC PANEL: CPT

## 2023-08-22 RX ORDER — TAMSULOSIN HYDROCHLORIDE 0.4 MG/1
1 CAPSULE ORAL DAILY
Qty: 90 CAPSULE | Refills: 1 | Status: SHIPPED | OUTPATIENT
Start: 2023-08-22

## 2023-08-30 ENCOUNTER — LAB (OUTPATIENT)
Dept: LAB | Facility: HOSPITAL | Age: 88
End: 2023-08-30
Payer: MEDICARE

## 2023-08-30 ENCOUNTER — TRANSCRIBE ORDERS (OUTPATIENT)
Dept: LAB | Facility: HOSPITAL | Age: 88
End: 2023-08-30
Payer: MEDICARE

## 2023-08-30 DIAGNOSIS — B96.1 KLEBSIELLA PNEUMONIAE (K. PNEUMONIAE) AS THE CAUSE OF DISEASES CLASSIFIED ELSEWHERE: ICD-10-CM

## 2023-08-30 DIAGNOSIS — N39.0 URINARY TRACT INFECTION WITHOUT HEMATURIA, SITE UNSPECIFIED: Primary | ICD-10-CM

## 2023-08-30 DIAGNOSIS — N13.6 HYDRONEPHROSIS WITH INFECTION: ICD-10-CM

## 2023-08-30 DIAGNOSIS — N10 ACUTE PYELONEPHRITIS WITHOUT LESION OF RENAL MEDULLARY NECROSIS: ICD-10-CM

## 2023-08-30 DIAGNOSIS — Z16.12 ESBL (EXTENDED SPECTRUM BETA-LACTAMASE) PRODUCING BACTERIA INFECTION: ICD-10-CM

## 2023-08-30 DIAGNOSIS — A49.9 ESBL (EXTENDED SPECTRUM BETA-LACTAMASE) PRODUCING BACTERIA INFECTION: ICD-10-CM

## 2023-08-30 DIAGNOSIS — B96.29 NON-SHIGA TOXIN-PRODUCING E. COLI: ICD-10-CM

## 2023-08-30 DIAGNOSIS — N39.0 URINARY TRACT INFECTION WITHOUT HEMATURIA, SITE UNSPECIFIED: ICD-10-CM

## 2023-08-30 LAB
BACTERIA UR QL AUTO: NORMAL /HPF
BILIRUB UR QL STRIP: NEGATIVE
CLARITY UR: CLEAR
COLOR UR: YELLOW
GLUCOSE UR STRIP-MCNC: NEGATIVE MG/DL
HGB UR QL STRIP.AUTO: NEGATIVE
HYALINE CASTS UR QL AUTO: NORMAL /LPF
KETONES UR QL STRIP: NEGATIVE
LEUKOCYTE ESTERASE UR QL STRIP.AUTO: NEGATIVE
NITRITE UR QL STRIP: NEGATIVE
PH UR STRIP.AUTO: 5.5 [PH] (ref 5–8)
PROT UR QL STRIP: ABNORMAL
RBC # UR STRIP: NORMAL /HPF
REF LAB TEST METHOD: NORMAL
SP GR UR STRIP: 1.01 (ref 1–1.03)
SQUAMOUS #/AREA URNS HPF: NORMAL /HPF
UROBILINOGEN UR QL STRIP: ABNORMAL
WBC # UR STRIP: NORMAL /HPF

## 2023-08-30 PROCEDURE — 87086 URINE CULTURE/COLONY COUNT: CPT

## 2023-08-30 PROCEDURE — 81001 URINALYSIS AUTO W/SCOPE: CPT

## 2023-08-31 LAB — BACTERIA SPEC AEROBE CULT: NORMAL

## 2023-08-31 RX ORDER — OMEPRAZOLE 20 MG/1
20 CAPSULE, DELAYED RELEASE ORAL DAILY
Qty: 90 CAPSULE | Refills: 0 | Status: SHIPPED | OUTPATIENT
Start: 2023-08-31

## 2023-09-11 NOTE — PROGRESS NOTES
Cardiology Outpatient Visit      Identification: Darien Camarena is a 87 y.o. male who resides in [place]    Reason for visit:  Paroxysmal atrial fibrillation  Mild CAD      Subjective      Shawnee returns to the office today for hospital follow-up.  He was at [facility] in June for elective cardioversion for atrial fibrillation.  He has been on amiodarone 100 mg daily since.  He also takes apixaban but has had to stop his apixaban for a couple of days due to recurrent hematuria due to chronic UTI.  He senses that he is in atrial fibrillation today due to his irregular pulse, but his not having symptoms.  Denies TIA or stroke symptoms.  He is scheduled to see urology regarding recurrent UTIs and hematuria in the near future.    Review of Systems   Genitourinary:  Positive for hematuria.     Allergies   Allergen Reactions    Xarelto [Rivaroxaban] GI Bleeding     GI bleed    Penicillins Hives     Has tolerated cefepime, ceftriaxone         Current Outpatient Medications   Medication Instructions    albuterol sulfate  (90 Base) MCG/ACT inhaler 2 puffs, Inhalation, Every 4 Hours PRN    allopurinol (ZYLOPRIM) 300 mg, Oral, Daily    amiodarone (PACERONE) 200 mg, Oral, Daily    apixaban (ELIQUIS) 5 mg, Oral, Every 12 Hours    Ascorbic Acid (VITAMIN C) 500 MG capsule 1 tablet, Oral, 4 Times Daily    docusate sodium (COLACE) 200 mg, Oral, Nightly PRN    Ferrous Gluconate (IRON) 240 (27 FE) MG tablet 1 tablet, Oral, Daily    finasteride (PROSCAR) 5 mg, Oral, Every Night at Bedtime    furosemide (LASIX) 20 mg, Oral, 2 Times Daily    metFORMIN (GLUCOPHAGE) 1000 MG tablet Take 1 tablet by mouth twice daily    metoprolol tartrate (LOPRESSOR) 50 mg, Oral, 2 Times Daily    omeprazole (PRILOSEC) 20 mg, Oral, Daily    potassium chloride (K-DUR,KLOR-CON) 20 MEQ CR tablet 20 mEq, Oral, Daily    pravastatin (PRAVACHOL) 40 mg, Oral, Daily    Probiotic Product (PROBIOTIC ADVANCED PO) 1 tablet, Oral, 2 Times Daily     "sertraline (ZOLOFT) 50 MG tablet Take 1 tablet by mouth once daily    tamsulosin (FLOMAX) 0.4 mg, Oral, Daily    Tiotropium Bromide Monohydrate (Spiriva Respimat) 1.25 MCG/ACT aerosol solution inhaler 2 puffs, Inhalation, Daily - RT    tiotropium bromide-olodaterol (STIOLTO RESPIMAT) 2.5-2.5 MCG/ACT aerosol solution inhaler 2 puffs, Inhalation, Daily, 2 inh once a day    valsartan (DIOVAN) 80 mg, Oral, Daily         Objective     /88 (BP Location: Left arm, Patient Position: Sitting)   Pulse 106   Ht 190.5 cm (75\")   Wt 120 kg (264 lb)   SpO2 95%   BMI 33.00 kg/m²       Constitutional:       Appearance: Healthy appearance.   Eyes:      General: No scleral icterus.  Neck:      Thyroid: No thyroid mass.      Vascular: No carotid bruit or JVD. JVD normal.   Pulmonary:      Effort: Pulmonary effort is normal.      Breath sounds: Normal breath sounds.   Cardiovascular:      Tachycardia present. Irregularly irregular rhythm.      Murmurs: There is no murmur.      No gallop.    Edema:     Peripheral edema absent.   Skin:     General: Skin is warm. There is no cyanosis.   Neurological:      General: No focal deficit present.      Mental Status: Alert.   Psychiatric:         Attention and Perception: Attention normal.       Result Review  (reviewed with patient):        ECG 12 Lead    Date/Time: 9/12/2023 1:05 PM  Performed by: Titus Oliveros IV, MD  Authorized by: Titus Oliveros IV, MD   Comparison: compared with previous ECG from 6/6/2023  Similar to previous ECG  Rhythm: atrial fibrillation  BPM: 119         Lab Results   Component Value Date    GLUCOSE 109 (H) 08/22/2023    BUN 20 08/22/2023    CREATININE 1.00 08/22/2023    EGFR 73.3 08/22/2023    BCR 20.0 08/22/2023    K 4.7 08/22/2023    CO2 29.0 08/22/2023    CALCIUM 9.0 08/22/2023    ALBUMIN 3.3 (L) 08/22/2023    BILITOT 0.5 08/22/2023    AST 24 08/22/2023    ALT 17 08/22/2023     Lab Results   Component Value Date    WBC 6.21 " 08/22/2023    HGB 12.4 (L) 08/22/2023    HCT 39.3 08/22/2023    MCV 95.6 08/22/2023     08/22/2023     Lab Results   Component Value Date    CHOL 102 06/21/2023    TRIG 78 06/21/2023    HDL 33 (L) 06/21/2023    LDL 53 06/21/2023    AST 24 08/22/2023    ALT 17 08/22/2023     Lab Results   Component Value Date    HGBA1C 6.40 (H) 06/21/2023           Assessment     Diagnoses and all orders for this visit:    1. Paroxysmal atrial fibrillation (Primary)  Overview:  Diagnosed August 2012.   FARHAD-guided ECV, 8/17/2012  CHADS-VASc 5 (age > 75, CAD, HTN, DM)  Successful external cardioversion for asymptomatic atrial fibrillation with RVR, 10/25/18  Successful cardioversion for atrial fibrillation RVR, 3/6/19.  Sotalol increased  Minimally symptomatic atrial fibrillation with cardioversion and the ER, 10/31/2019  ED cardioversion for A. fib with RVR, 7/21/2020  ED cardioversion for asymptomatic A. fib with RVR, 12/27/2020   ED cardioversion for asymptomatic A. fib with RVR x 2  occasions, March 2021  Transition from sotalol to amiodarone, 4/14/2021  Successful FARHAD/ECV May 3, 2021  Successful cardioversion, 8/5/2022  Echo 8/22 EF 50%, anatomically and functionally normal valves    Assessment & Plan:  Recurrent atrial fibrillation with mild RVR without symptoms today  Patient is experiencing recurrent hematuria on apixaban  Refer to EP for consideration of Watchman device and discussion on rate control strategy versus rhythm control strategy  Increase amiodarone to 200 mg daily for the time being.  Patient is a candidate for rate control if rate control feasible/achievable    Orders:  -     apixaban (Eliquis) 5 MG tablet tablet; Take 1 tablet by mouth Every 12 (Twelve) Hours.  Dispense: 180 tablet; Refill: 3  -     metoprolol tartrate (LOPRESSOR) 50 MG tablet; Take 1 tablet by mouth 2 (Two) Times a Day.  Dispense: 180 tablet; Refill: 3  -     amiodarone (PACERONE) 200 MG tablet; Take 1 tablet by mouth Daily.  Dispense: 90  tablet; Refill: 1  -     Ambulatory Referral to Cardiac Electrophysiology    2. Essential hypertension  Overview:  Target blood pressure <130/80 mmHg    Assessment & Plan:  Controlled    Orders:  -     valsartan (DIOVAN) 80 MG tablet; Take 1 tablet by mouth Daily.  Dispense: 90 tablet; Refill: 1    Other orders  -     ECG 12 Lead          Plan   Refer to EP for watchman and A-fib management  Increase amiodarone to 200 mg daily until EP evaluation      Follow-up   Return in about 6 months (around 3/12/2024).        Taurus Oliveros MD, FACC, [facility]  9/12/2023

## 2023-09-12 ENCOUNTER — OFFICE VISIT (OUTPATIENT)
Dept: CARDIOLOGY | Facility: CLINIC | Age: 88
End: 2023-09-12
Payer: MEDICARE

## 2023-09-12 ENCOUNTER — OFFICE VISIT (OUTPATIENT)
Dept: NEUROSURGERY | Facility: CLINIC | Age: 88
End: 2023-09-12
Payer: MEDICARE

## 2023-09-12 VITALS
HEIGHT: 75 IN | SYSTOLIC BLOOD PRESSURE: 112 MMHG | BODY MASS INDEX: 32.83 KG/M2 | DIASTOLIC BLOOD PRESSURE: 88 MMHG | WEIGHT: 264 LBS | HEART RATE: 106 BPM | OXYGEN SATURATION: 95 %

## 2023-09-12 VITALS
TEMPERATURE: 96.9 F | BODY MASS INDEX: 32.8 KG/M2 | DIASTOLIC BLOOD PRESSURE: 64 MMHG | SYSTOLIC BLOOD PRESSURE: 108 MMHG | HEIGHT: 75 IN | WEIGHT: 263.8 LBS

## 2023-09-12 DIAGNOSIS — I10 ESSENTIAL HYPERTENSION: Chronic | ICD-10-CM

## 2023-09-12 DIAGNOSIS — M51.36 DDD (DEGENERATIVE DISC DISEASE), LUMBAR: Primary | ICD-10-CM

## 2023-09-12 DIAGNOSIS — G89.29 CHRONIC BILATERAL LOW BACK PAIN WITHOUT SCIATICA: ICD-10-CM

## 2023-09-12 DIAGNOSIS — I48.0 PAROXYSMAL ATRIAL FIBRILLATION: Primary | Chronic | ICD-10-CM

## 2023-09-12 DIAGNOSIS — M54.50 CHRONIC BILATERAL LOW BACK PAIN WITHOUT SCIATICA: ICD-10-CM

## 2023-09-12 PROBLEM — J10.1 INFLUENZA A: Status: RESOLVED | Noted: 2022-12-12 | Resolved: 2023-09-12

## 2023-09-12 PROBLEM — E11.9 TYPE 2 DIABETES MELLITUS: Status: RESOLVED | Noted: 2022-12-12 | Resolved: 2023-09-12

## 2023-09-12 PROBLEM — I16.0 HYPERTENSIVE URGENCY: Status: RESOLVED | Noted: 2022-12-12 | Resolved: 2023-09-12

## 2023-09-12 PROBLEM — J96.01 ACUTE RESPIRATORY FAILURE WITH HYPOXIA: Status: RESOLVED | Noted: 2022-12-12 | Resolved: 2023-09-12

## 2023-09-12 PROCEDURE — 99214 OFFICE O/P EST MOD 30 MIN: CPT | Performed by: INTERNAL MEDICINE

## 2023-09-12 PROCEDURE — 93000 ELECTROCARDIOGRAM COMPLETE: CPT | Performed by: INTERNAL MEDICINE

## 2023-09-12 RX ORDER — VALSARTAN 80 MG/1
80 TABLET ORAL DAILY
Qty: 90 TABLET | Refills: 1 | Status: SHIPPED | OUTPATIENT
Start: 2023-09-12

## 2023-09-12 RX ORDER — AMIODARONE HYDROCHLORIDE 200 MG/1
200 TABLET ORAL DAILY
Qty: 90 TABLET | Refills: 1 | Status: SHIPPED | OUTPATIENT
Start: 2023-09-12

## 2023-09-12 RX ORDER — METOPROLOL TARTRATE 50 MG/1
50 TABLET, FILM COATED ORAL 2 TIMES DAILY
Qty: 180 TABLET | Refills: 3 | Status: SHIPPED | OUTPATIENT
Start: 2023-09-12

## 2023-09-12 NOTE — PROGRESS NOTES
Patient: Darien Camarena  : 1935  Chart #: [id number]    Date of Service: 2023    Chief Complaint   Patient presents with    Back Pain       Back Pain  Pertinent negatives include no abdominal pain, chest pain, dysuria, fever, headaches, numbness or weakness.   This is a 87-year-old gentleman who presents with back pain that radiates all across the lower back and will radiate to the flanks.  On today's visit he is having no pain it is all resolved.  In talking with the patient he reports that the only time he seems to have this significant pain in his back is when he has hematuria.  He is being seen by urologist in the near future.  He denies any pain into the legs, no symptoms of neurogenic claudication.  He has a CT scan of the lumbar spine for review today.    Chronic Illnesses:  Past Medical History:   Diagnosis Date    Arrhythmia     Atrial fibrillation     Chronic kidney disease     Diabetes mellitus     E. coli sepsis 2022    GERD (gastroesophageal reflux disease)     Gout     History of colonoscopy 2012    History of radiation therapy 2023    SBRT LLL lung    Hyperlipidemia     Hypertension     Lung cancer     STEVEN on CPAP     PAF (paroxysmal atrial fibrillation)     Prostatism     Sleep apnea     CPAP HS         Past Surgical History:   Procedure Laterality Date    CARDIAC CATHETERIZATION Left 2022    Procedure: Left Heart Cath;  Surgeon: Titus Oliveros IV, MD;  Location: [facility] CATH INVASIVE LOCATION;  Service: Cardiovascular;  Laterality: Left;    CARDIOVERSION      CATARACT EXTRACTION Left     KIDNEY STONE SURGERY         Allergies   Allergen Reactions    Xarelto [Rivaroxaban] GI Bleeding     GI bleed    Penicillins Hives     Has tolerated cefepime, ceftriaxone         Current Outpatient Medications:     albuterol sulfate  (90 Base) MCG/ACT inhaler, Inhale 2 puffs Every 4 (Four) Hours As Needed for Wheezing., Disp: 18 g, Rfl: 5    allopurinol  (ZYLOPRIM) 300 MG tablet, Take 1 tablet by mouth Daily., Disp: 90 tablet, Rfl: 1    amiodarone (PACERONE) 200 MG tablet, Take 1 tablet by mouth Daily., Disp: 90 tablet, Rfl: 1    apixaban (Eliquis) 5 MG tablet tablet, Take 1 tablet by mouth Every 12 (Twelve) Hours., Disp: 180 tablet, Rfl: 3    Ascorbic Acid (VITAMIN C) 500 MG capsule, Take 1 tablet by mouth 4 (four) times a day., Disp: , Rfl:     docusate sodium (COLACE) 100 MG capsule, Take 2 capsules by mouth At Night As Needed., Disp: , Rfl:     Ferrous Gluconate (IRON) 240 (27 FE) MG tablet, Take 1 tablet by mouth Daily., Disp: , Rfl:     furosemide (Lasix) 20 MG tablet, Take 1 tablet by mouth 2 (Two) Times a Day., Disp: 180 tablet, Rfl: 2    metFORMIN (GLUCOPHAGE) 1000 MG tablet, Take 1 tablet by mouth twice daily, Disp: 180 tablet, Rfl: 0    metoprolol tartrate (LOPRESSOR) 50 MG tablet, Take 1 tablet by mouth 2 (Two) Times a Day., Disp: 180 tablet, Rfl: 3    potassium chloride (K-DUR,KLOR-CON) 20 MEQ CR tablet, Take 1 tablet by mouth Daily., Disp: 90 tablet, Rfl: 1    pravastatin (PRAVACHOL) 40 MG tablet, Take 1 tablet by mouth Daily., Disp: 90 tablet, Rfl: 1    Probiotic Product (PROBIOTIC ADVANCED PO), Take 1 tablet by mouth 2 (Two) Times a Day., Disp: , Rfl:     sertraline (ZOLOFT) 50 MG tablet, Take 1 tablet by mouth once daily, Disp: 90 tablet, Rfl: 0    tamsulosin (FLOMAX) 0.4 MG capsule 24 hr capsule, Take 1 capsule by mouth once daily, Disp: 90 capsule, Rfl: 1    Tiotropium Bromide Monohydrate (Spiriva Respimat) 1.25 MCG/ACT aerosol solution inhaler, Inhale 2 puffs Daily., Disp: 12 g, Rfl: 1    tiotropium bromide-olodaterol (STIOLTO RESPIMAT) 2.5-2.5 MCG/ACT aerosol solution inhaler, Inhale 2 puffs Daily. 2 inh once a day, Disp: 1 each, Rfl: 3    valsartan (DIOVAN) 80 MG tablet, Take 1 tablet by mouth Daily., Disp: 90 tablet, Rfl: 1    finasteride (PROSCAR) 5 MG tablet, Take 1 tablet by mouth every night at bedtime., Disp: 90 tablet, Rfl: 1     omeprazole (priLOSEC) 20 MG capsule, Take 1 capsule by mouth once daily, Disp: 90 capsule, Rfl: 0    Current Facility-Administered Medications:     albuterol sulfate HFA (PROVENTIL HFA;VENTOLIN HFA;PROAIR HFA) inhaler 4 puff, 4 puff, Inhalation, Once, Vikram Eagle MD    Social History     Socioeconomic History    Marital status:    Tobacco Use    Smoking status: Former     Packs/day: 1.00     Types: Cigarettes     Start date: 1947     Quit date: 1960     Years since quittin.3     Passive exposure: Past    Smokeless tobacco: Former     Types: Chew     Quit date:     Tobacco comments:     started at 13 yo.   Vaping Use    Vaping Use: Never used   Substance and Sexual Activity    Alcohol use: No    Drug use: Never    Sexual activity: Defer       Family History   Problem Relation Age of Onset    Alzheimer's disease Mother     COPD Father     Lung cancer Father     No Known Problems Daughter     No Known Problems Daughter     No Known Problems Daughter                Social History    Tobacco Use      Smoking status: Former        Packs/day: 1.00        Types: Cigarettes        Start date: 1947        Quit date: 1960        Years since quittin.3        Passive exposure: Past      Smokeless tobacco: Former        Types: Chew        Quit date:       Tobacco comments: started at 13 yo.       Review of Systems   Constitutional:  Negative for activity change, appetite change, chills, diaphoresis, fatigue, fever and unexpected weight change.   HENT:  Negative for congestion, dental problem, drooling, ear discharge, ear pain, facial swelling, hearing loss, mouth sores, nosebleeds, postnasal drip, rhinorrhea, sinus pressure, sinus pain, sneezing, sore throat, tinnitus, trouble swallowing and voice change.    Eyes:  Negative for photophobia, pain, discharge, redness, itching and visual disturbance.   Respiratory:  Negative for apnea, cough, choking, chest tightness, shortness of  breath, wheezing and stridor.    Cardiovascular:  Negative for chest pain, palpitations and leg swelling.   Gastrointestinal:  Negative for abdominal distention, abdominal pain, anal bleeding, blood in stool, constipation, diarrhea, nausea, rectal pain and vomiting.   Endocrine: Negative for cold intolerance, heat intolerance, polydipsia, polyphagia and polyuria.   Genitourinary:  Negative for decreased urine volume, difficulty urinating, dysuria, enuresis, flank pain, frequency, genital sores, hematuria, penile discharge, penile pain, penile swelling, scrotal swelling, testicular pain and urgency.   Musculoskeletal:  Positive for back pain. Negative for arthralgias, gait problem, joint swelling, myalgias, neck pain and neck stiffness.   Skin:  Negative for color change, pallor, rash and wound.   Allergic/Immunologic: Negative for environmental allergies, food allergies and immunocompromised state.   Neurological:  Negative for dizziness, tremors, seizures, syncope, facial asymmetry, speech difficulty, weakness, light-headedness, numbness and headaches.   Hematological:  Negative for adenopathy. Does not bruise/bleed easily.   Psychiatric/Behavioral:  Negative for agitation, behavioral problems, confusion, decreased concentration, dysphoric mood, hallucinations, self-injury, sleep disturbance and suicidal ideas. The patient is not nervous/anxious and is not hyperactive.       Gait & Balance Assessment:  Risk assessment for falls. Fall precautions:  such as;   Using gait aids a cane, walker at the appropriate height at all times for ambulation or if necessary a wheelchair  Removing all area rugs and coffee tables to create a safe environment at home  Ensure clean, dry floors  Wearing supportive footwear and properly fitting clothing  Ensure bed/chair is appropriate height and patient's feet can touch the floor  Using a shower transfer bench  Using walk-in shower and having shower safety bars installed  Ensure proper  "lighting, minimize glare  Have nightlights operational and in use  Participation in an exercise program for gait training, balance training and strength  Avoid carrying laundry up and down steps  Ensure proper compliance and organization of medications to avoid errors   Avoid use of over the counter sedatives and alcohol consumption  Ensure easy access to call bell, glasses, TV control, telephone  Ensure glasses/hearing aids are in use or close by (on top of night table)     Physical examination:  Blood pressure 108/64, temperature 96.9 °F (36.1 °C), temperature source Infrared, height 190.5 cm (75\"), weight 120 kg (263 lb 12.8 oz).  HEENT- normocephalic, atraumatic, sclera clear  Lungs-normal expansion, no wheezing  Heart-regular rate and rhythm  Extremities-positive pulses, no edema    Neurologic Exam  WDWN WM  A/A/C, speech clear, attention normal, conversant, answers questions appropriately, good historian.  Cranial nerves II through XII are intact.  Motor examination does not reveal weakness in the lower extremities.   Sensation is intact.  Gait is normal, balance is normal.   No tremors are noted.  Reflexes are intact in the patellas absent in the Achilles.   Palpation of the lumbar spine is negative    Radiographic Imaging:  For my review is a CT scan of the lumbar spine which shows multilevel degenerative disc disease, facet arthropathy and multilevel spondylosis.    Medical Decision Making  Diagnoses and all orders for this visit:    1. DDD (degenerative disc disease), lumbar (Primary)  -     Ambulatory Referral to Physical Therapy Evaluate and treat; Stretching, ROM    2. Chronic bilateral low back pain without sciatica    With the patient's symptoms appearing to correlate with his hematuria I feel that it is most appropriate for him to see urology.  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  He does have occasional difficulties with his gait and balance, he has had 1 fall.  As such, I have recommended physical therapy and he and family are in agreement.  It was a pleasure providing neurosurgical evaluation.    Any copied data from previous notes included in the (1) HPI, (2) PE, (3) MDM and/or assessment and plan has been reviewed and is accurate as of this day.    Including assessment, review of prior documentation, review and interpretation of new diagnostic studies, discussing these findings with the patient and documentation, 30 minutes total time was spent on this appointment.    Denise Del Valle, [facility]    Patient Care Team:  Pito Way MD as PCP - General  Pito Way MD as PCP - Family Medicine  Titus Oliveros IV, MD as Consulting Physician (Cardiology)  Blanquita Ansari APRN as Nurse Practitioner (Interventional Cardiology)  Abbe Leary MD as Consulting Physician (Radiation Oncology)  Last Og MD as Consulting Physician (Infectious Diseases)  Vikram Eagle MD as Consulting Physician (Pulmonary Disease)  Krystian Larkin MD as Consulting Physician (Urology)

## 2023-09-12 NOTE — ASSESSMENT & PLAN NOTE
Recurrent atrial fibrillation with mild RVR without symptoms today  Patient is experiencing recurrent hematuria on apixaban  Refer to EP for consideration of Watchman device and discussion on rate control strategy versus rhythm control strategy  Increase amiodarone to 200 mg daily for the time being.  Patient is a candidate for rate control if rate control feasible/achievable

## 2023-09-13 PROBLEM — M54.50 CHRONIC BILATERAL LOW BACK PAIN WITHOUT SCIATICA: Status: ACTIVE | Noted: 2023-09-13

## 2023-09-13 PROBLEM — G89.29 CHRONIC BILATERAL LOW BACK PAIN WITHOUT SCIATICA: Status: ACTIVE | Noted: 2023-09-13

## 2023-09-18 DIAGNOSIS — F32.9 REACTIVE DEPRESSION: ICD-10-CM

## 2023-09-21 ENCOUNTER — OFFICE VISIT (OUTPATIENT)
Dept: UROLOGY | Facility: CLINIC | Age: 88
End: 2023-09-21
Payer: MEDICARE

## 2023-09-21 VITALS
WEIGHT: 263 LBS | HEART RATE: 105 BPM | DIASTOLIC BLOOD PRESSURE: 76 MMHG | OXYGEN SATURATION: 92 % | HEIGHT: 75 IN | BODY MASS INDEX: 32.7 KG/M2 | SYSTOLIC BLOOD PRESSURE: 136 MMHG

## 2023-09-21 DIAGNOSIS — N39.0 RECURRENT UTI: ICD-10-CM

## 2023-09-21 DIAGNOSIS — R33.9 URINARY RETENTION: Primary | ICD-10-CM

## 2023-09-21 DIAGNOSIS — N13.30 HYDRONEPHROSIS, LEFT: ICD-10-CM

## 2023-09-21 DIAGNOSIS — N30.00 ACUTE CYSTITIS WITHOUT HEMATURIA: ICD-10-CM

## 2023-09-21 LAB
BILIRUB BLD-MCNC: NEGATIVE MG/DL
CLARITY, POC: ABNORMAL
COLOR UR: YELLOW
EXPIRATION DATE: ABNORMAL
GLUCOSE UR STRIP-MCNC: NEGATIVE MG/DL
KETONES UR QL: NEGATIVE
LEUKOCYTE EST, POC: ABNORMAL
Lab: ABNORMAL
NITRITE UR-MCNC: NEGATIVE MG/ML
PH UR: 6 [PH] (ref 5–8)
PROT UR STRIP-MCNC: NEGATIVE MG/DL
RBC # UR STRIP: ABNORMAL /UL
SP GR UR: 1.01 (ref 1–1.03)
UROBILINOGEN UR QL: NORMAL

## 2023-09-21 PROCEDURE — 87186 SC STD MICRODIL/AGAR DIL: CPT | Performed by: STUDENT IN AN ORGANIZED HEALTH CARE EDUCATION/TRAINING PROGRAM

## 2023-09-21 PROCEDURE — 87077 CULTURE AEROBIC IDENTIFY: CPT | Performed by: STUDENT IN AN ORGANIZED HEALTH CARE EDUCATION/TRAINING PROGRAM

## 2023-09-21 PROCEDURE — 87086 URINE CULTURE/COLONY COUNT: CPT | Performed by: STUDENT IN AN ORGANIZED HEALTH CARE EDUCATION/TRAINING PROGRAM

## 2023-09-21 RX ORDER — CEFUROXIME AXETIL 500 MG/1
500 TABLET ORAL 2 TIMES DAILY
Qty: 10 TABLET | Refills: 0 | Status: SHIPPED | OUTPATIENT
Start: 2023-09-21 | End: 2023-09-26

## 2023-09-21 NOTE — PROGRESS NOTES
Office Note Kidney Stone      Patient Name: Darien Camarena  : 1935   MRN: [id number]     Chief Complaint: History of Kidney Stones.    Chief Complaint   Patient presents with    Hydronephrosis, left       Referring Provider: Last Og MD    History of Present Illness: Draien Camarena is a 87 y.o. male who presents today for history of Kidney Stones. ***     Prior stone prevention medications: ***    Stone related history  Family history of stones:   {YES (DEF) /NO:}  Renal disease or anatomic abnormality: {YES (DEF) /NO:}  Malabsorptive disease or gastric bypass: {YES (DEF) /NO:}  Frequent UTI's    {YES (DEF) /NO:}  Parathyroid disease    {YES (DEF) /NO:}    Diet  ***    Daily Fluid Intake  ***     Prior kidney stone surgeries?       Subjective      Review of System: Review of Systems   I have reviewed the ROS documented by my clinical staff, I have updated appropriately and I agree. Latasha Mantilla MA    Past Medical History:   Past Medical History:   Diagnosis Date    Arrhythmia     Atrial fibrillation     Chronic kidney disease     Diabetes mellitus     E. coli sepsis 2022    GERD (gastroesophageal reflux disease)     Gout     History of colonoscopy 2012    History of radiation therapy 2023    SBRT LLL lung    Hyperlipidemia     Hypertension     Lung cancer     STEVEN on CPAP     PAF (paroxysmal atrial fibrillation)     Prostatism     Sleep apnea     CPAP HS       Past Surgical History:   Past Surgical History:   Procedure Laterality Date    CARDIAC CATHETERIZATION Left 2022    Procedure: Left Heart Cath;  Surgeon: Titus Oliveros IV, MD;  Location: [facility] CATH INVASIVE LOCATION;  Service: Cardiovascular;  Laterality: Left;    CARDIOVERSION      CATARACT EXTRACTION Left     KIDNEY STONE SURGERY         Family History:   Family History   Problem Relation Age of Onset    Alzheimer's disease Mother     COPD Father     Lung cancer  Father     No Known Problems Daughter     No Known Problems Daughter     No Known Problems Daughter        Social History:   Social History     Socioeconomic History    Marital status:    Tobacco Use    Smoking status: Former     Packs/day: 1.00     Types: Cigarettes     Start date: 1947     Quit date: 1960     Years since quittin.3     Passive exposure: Past    Smokeless tobacco: Former     Types: Chew     Quit date:     Tobacco comments:     started at 15 yo.   Vaping Use    Vaping Use: Never used   Substance and Sexual Activity    Alcohol use: No    Drug use: Not Currently    Sexual activity: Defer       Medications:     Current Outpatient Medications:     albuterol sulfate  (90 Base) MCG/ACT inhaler, Inhale 2 puffs Every 4 (Four) Hours As Needed for Wheezing., Disp: 18 g, Rfl: 5    allopurinol (ZYLOPRIM) 300 MG tablet, Take 1 tablet by mouth Daily., Disp: 90 tablet, Rfl: 1    amiodarone (PACERONE) 200 MG tablet, Take 1 tablet by mouth Daily., Disp: 90 tablet, Rfl: 1    apixaban (Eliquis) 5 MG tablet tablet, Take 1 tablet by mouth Every 12 (Twelve) Hours., Disp: 180 tablet, Rfl: 3    Ascorbic Acid (VITAMIN C) 500 MG capsule, Take 1 tablet by mouth 4 (four) times a day., Disp: , Rfl:     docusate sodium (COLACE) 100 MG capsule, Take 2 capsules by mouth At Night As Needed., Disp: , Rfl:     Ferrous Gluconate (IRON) 240 (27 FE) MG tablet, Take 1 tablet by mouth Daily., Disp: , Rfl:     finasteride (PROSCAR) 5 MG tablet, Take 1 tablet by mouth every night at bedtime., Disp: 90 tablet, Rfl: 1    furosemide (Lasix) 20 MG tablet, Take 1 tablet by mouth 2 (Two) Times a Day., Disp: 180 tablet, Rfl: 2    metFORMIN (GLUCOPHAGE) 1000 MG tablet, Take 1 tablet by mouth twice daily, Disp: 180 tablet, Rfl: 0    metoprolol tartrate (LOPRESSOR) 50 MG tablet, Take 1 tablet by mouth 2 (Two) Times a Day., Disp: 180 tablet, Rfl: 3    omeprazole (priLOSEC) 20 MG capsule, Take 1 capsule by mouth once  daily, Disp: 90 capsule, Rfl: 0    potassium chloride (K-DUR,KLOR-CON) 20 MEQ CR tablet, Take 1 tablet by mouth Daily., Disp: 90 tablet, Rfl: 1    pravastatin (PRAVACHOL) 40 MG tablet, Take 1 tablet by mouth Daily., Disp: 90 tablet, Rfl: 1    Probiotic Product (PROBIOTIC ADVANCED PO), Take 1 tablet by mouth 2 (Two) Times a Day., Disp: , Rfl:     sertraline (ZOLOFT) 50 MG tablet, Take 1 tablet by mouth once daily, Disp: 90 tablet, Rfl: 0    tamsulosin (FLOMAX) 0.4 MG capsule 24 hr capsule, Take 1 capsule by mouth once daily, Disp: 90 capsule, Rfl: 1    Tiotropium Bromide Monohydrate (Spiriva Respimat) 1.25 MCG/ACT aerosol solution inhaler, Inhale 2 puffs Daily., Disp: 12 g, Rfl: 1    tiotropium bromide-olodaterol (STIOLTO RESPIMAT) 2.5-2.5 MCG/ACT aerosol solution inhaler, Inhale 2 puffs Daily. 2 inh once a day, Disp: 1 each, Rfl: 3    valsartan (DIOVAN) 80 MG tablet, Take 1 tablet by mouth Daily., Disp: 90 tablet, Rfl: 1    Current Facility-Administered Medications:     albuterol sulfate HFA (PROVENTIL HFA;VENTOLIN HFA;PROAIR HFA) inhaler 4 puff, 4 puff, Inhalation, Once, Vikram Eagle MD    Allergies:   Allergies   Allergen Reactions    Xarelto [Rivaroxaban] GI Bleeding     GI bleed    Penicillins Hives     Has tolerated cefepime, ceftriaxone       IPSS Questionnaire (AUA-7):  Over the past month…    1)  Incomplete Emptying:       How often have you had a sensation of not emptying you had the sensation of not emptying your bladder completely after you finished urinating?  {Ratin}   2)  Frequency:       How often have you had the urinate again less than two hours after you finished urinating?  {Ratin}   3)  Intermittency:       How often have you found you stopped and started again several times when you urinated?   {Ratin}   4) Urgency:      How often have you found it difficult to postpone urination?  {Ratin}   5) Weak Stream:      How often have you had a weak urinary stream?   "{Ratin}   6) Straining:       How often have you had to push or strain to begin urination?  {Ratin}   7) Nocturia:      How many times did you most typically get up to urinate from the time you went to bed at night until the time you got up in the morning?  {Ratin}   Total Score:  {0-35:69205}   The International Prostate Symptom Score (IPSS) is used to screen, diagnose, track symptoms of benign prostatic hyperplasia (BPH).   0-7 (Mild Symptoms) 8-19 (Moderate) 20-35 (Severe)   Quality of Life (QoL):  If you were to spend the rest of your life with your urinary condition just the way it is now, how would you feel about that? {Ratin}   Urine Leakage (Incontinence) {Ratin}       Sexual Health Inventory for Men (CIARAN)   Over the past 6 months:     1. How do you rate your confidence that you could get and keep an erection?  {CIARAN Score 1:17300}   2. When you had erections with sexual stimulation, how often were your erections hard enough for penetration (entering your partner)?  {CIARAN Scores 2:16687}   3. During sexual intercourse, how often were you able to maintain your erection after you had penetrated (entered) your partner?  {CIARAN Scores 3:36292}   4. During sexual intercourse, how difficult was it to maintain your erection to completion of intercourse?  {CIARAN Scores 4:54400}   5. When you attempted sexual intercourse, how often was it satisfactory for you?  {CIARAN Scores 5:74251}    Total Score: {CIARAN Total:00858}   The Sexual Health Inventory for Men further classifies ED severity with the following breakpoints:   1-7 (Severe ED) 8-11 (Moderate ED) 12-16 (Mild to Moderate ED) 17-21 (Mild ED)      Post void residual bladder scan:   ***    Objective     Physical Exam:   Vital Signs:   Vitals:    23 0924   Height: 190.5 cm (75\")     Body mass index is 32.97 kg/m².     Physical Exam    Labs:   Brief Urine Lab Results  (Last result in the past 365 days)        Color   Clarity   " Blood   Leuk Est   Nitrite   Protein   CREAT   Urine HCG        08/30/23 1112 Yellow   Clear   Negative   Negative   Negative   Trace                   Urine Culture          6/27/2023    16:18 8/18/2023    14:30 8/30/2023    11:12   Urine Culture   Urine Culture >100,000 CFU/mL Klebsiella pneumoniae ESBL  >100,000 CFU/mL Klebsiella pneumoniae ESBL  25,000 CFU/mL Normal Urogenital Kareen         Color, UA   Date Value Ref Range Status   08/30/2023 Yellow Yellow, Straw Final     Bilirubin, UA   Date Value Ref Range Status   08/30/2023 Negative Negative Final     Urobilinogen, UA   Date Value Ref Range Status   08/30/2023 0.2 E.U./dL 0.2 - 1.0 E.U./dL Final     Blood, UA   Date Value Ref Range Status   06/06/2023 50 Meet/ul (A) Negative Final     Leuk Esterase, UA   Date Value Ref Range Status   08/30/2023 Negative Negative Final       Lab Results   Component Value Date    GLUCOSE 109 (H) 08/22/2023    CALCIUM 9.0 08/22/2023     08/22/2023    K 4.7 08/22/2023    CO2 29.0 08/22/2023     08/22/2023    BUN 20 08/22/2023    CREATININE 1.00 08/22/2023    EGFRIFNONA 79 12/16/2021    BCR 20.0 08/22/2023    ANIONGAP 8.0 08/22/2023       Lab Results   Component Value Date    WBC 6.21 08/22/2023    HGB 12.4 (L) 08/22/2023    HCT 39.3 08/22/2023    MCV 95.6 08/22/2023     08/22/2023       Stone Analysis  No components found for: [id number], [id number], [id number]    Images:   CT Abdomen Pelvis Without Contrast    Result Date: 6/27/2023  Impression: No acute abdominopelvic findings. Redemonstration of mild left-sided hydronephrosis and left hydroureter, without evidence of ureteral stone or clear obstructing lesion. The appearance is unchanged from prior CT. There is nonobstructing left nephrolithiasis. No acute fracture or traumatic malalignment in the lumbar spine. Multilevel degenerative changes detailed above. Electronically Signed: Andrey Larkin  6/27/2023 2:09 PM EDT  Workstation ID: [id number]    CT  "Abdomen Pelvis Without Contrast    Result Date: 6/8/2023  1. Stable chronic left severe hydroureter/megaureter 2. Distention of the urinary bladder. Correlate with any difficulty voiding 3. Nonobstructing left nephrolithiasis Electronically Signed: Franky Rivas  6/8/2023 12:36 PM EDT  Workstation ID: [id number]    CT Chest Without Contrast Diagnostic    Result Date: 6/6/2023  Impression: 1.Mildly decreased size of solid left lower lobe nodule, previously subsolid. 2.New linear opacities in the left lower lobe and lingula, consistent with post-treatment changes. Electronically Signed: Alisia Prerna  6/6/2023 12:33 PM EDT  Workstation ID: [id number]    CT Lumbar Spine Without Contrast    Result Date: 6/27/2023  Impression: No acute abdominopelvic findings. Redemonstration of mild left-sided hydronephrosis and left hydroureter, without evidence of ureteral stone or clear obstructing lesion. The appearance is unchanged from prior CT. There is nonobstructing left nephrolithiasis. No acute fracture or traumatic malalignment in the lumbar spine. Multilevel degenerative changes detailed above. Electronically Signed: Andrey Larkin  6/27/2023 2:09 PM EDT  Workstation ID: [id number]      Measures:   Tobacco:   Darien Camarena  reports that he quit smoking about 63 years ago. His smoking use included cigarettes. He started smoking about 76 years ago. He smoked an average of 1 pack per day. He has been exposed to tobacco smoke. He quit smokeless tobacco use about 12 years ago.  His smokeless tobacco use included chew.. I have educated him on the risk of diseases from using tobacco products such as {Tobacco Cessation Diseases:35142::\"cancer\",\"COPD\",\"heart disease\"}.     I advised him to quit and he is {Willing/Not Willing to Quit Tobacco Products:72828}.    I spent {Time Spent Tobacco :74126} minutes counseling the patient.           Urine Incontinence: ( NOUI)  ***     Assessment / Plan      Assessment/Plan:   Darien Camarena " is a 87 y.o. male who presented today for kidney stones. ***    There are no diagnoses linked to this encounter.     Patient Education:   1. Fluid intake goal ~ 2.5L per day.    Follow Up:   No follow-ups on file.    I spent approximately *** minutes providing clinical care for this patient; including review of patient's chart and provider documentation, face to face time spent with patient in examination room (obtaining history, performing physical exam, discussing diagnosis and management options), placing orders, and completing patient documentation.     Krystian Larkin MD  [facility] [place] [facility]

## 2023-09-21 NOTE — PROGRESS NOTES
Office Visit [facility]      Patient Name: Darien Camarena  : 1935   MRN: [id number]     Chief Complaint:    Chief Complaint   Patient presents with    Hydronephrosis, left    Gross  Hematuria    Recurrent UTI       Referring Provider: Last Og MD    History of Present Illness: Darien Camarena is a 87 y.o. male who presents to Urology today for left hydronephrosis and recurrent UTI.  He has a complex urologic history.  Outside records reviewed with Dr. Rios of the [facility] urology group.  The patient has a known history of left-sided hydronephrosis with recurrent UTI.  Dr. 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  For a time the patient was managed with a left-sided nephrostomy tube which was eventually removed outpatient for unclear reasons.  The patient and his daughter are present today, they report he has been admitted to the hospital with urosepsis and pyelonephritis.  Appears to have last seen Dr. Rios in  at which point he had persistent pyuria.  Patient underwent CT imaging in  which was reviewed with the family at which point he was admitted with UTI and hematuria.  This demonstrates chronic severe left-sided hydroureteronephrosis/megaureter.  There was evidence of distention of the bladder at that time.  There is mention of a few small nonobstructing left-sided kidney stones.  Patient then underwent a repeat CT scan 2023 demonstrating left-sided hydroureteronephrosis and distended bladder.    In discussion with the patient, he does follow with infectious disease.  Last saw ID on 2023.  Had been receiving IV ertapenem for another UTI.  Last infection 2023, Klebsiella ESBL.  Same organism cultured 2023.    In discussion with the patient, he reports difficulty urinating, he states he has a fairly slow stream but he felt this was normal for his  age group.    First bladder scan demonstrates greater than 450 mL.  I recommended intermittent catheterization to assess true bladder volume.    Intermittent cath  Patient laid supine on the examination table.  The urethra was swabbed with iodine.  A 14 Irish straight catheter prelubricated was advanced into the bladder with some difficulty.  1.4 L of concentrated yellow urine was returned consistent with chronic urinary retention.    Subjective      Review of System: Review of Systems   Genitourinary:  Positive for difficulty urinating and flank pain.    I have reviewed the ROS documented by my clinical staff, I have updated appropriately and I agree. Krystain Larkin MD    Past Medical History:   Past Medical History:   Diagnosis Date    Arrhythmia     Atrial fibrillation     Chronic kidney disease     Diabetes mellitus     E. coli sepsis 06/23/2022    GERD (gastroesophageal reflux disease)     Gout     History of colonoscopy 09/12/2012    History of radiation therapy 02/24/2023    SBRT LLL lung    Hyperlipidemia     Hypertension     Lung cancer     STEVEN on CPAP     PAF (paroxysmal atrial fibrillation)     Prostatism     Sleep apnea     CPAP HS       Past Surgical History:   Past Surgical History:   Procedure Laterality Date    CARDIAC CATHETERIZATION Left 9/29/2022    Procedure: Left Heart Cath;  Surgeon: Titus Oliveros IV, MD;  Location: [facility] CATH INVASIVE LOCATION;  Service: Cardiovascular;  Laterality: Left;    CARDIOVERSION      CATARACT EXTRACTION Left     KIDNEY STONE SURGERY         Family History:   Family History   Problem Relation Age of Onset    Alzheimer's disease Mother     COPD Father     Lung cancer Father     No Known Problems Daughter     No Known Problems Daughter     No Known Problems Daughter        Social History:   Social History     Socioeconomic History    Marital status:    Tobacco Use    Smoking status: Former     Packs/day: 1.00     Types: Cigarettes     Start  date: 1947     Quit date: 1960     Years since quittin.3     Passive exposure: Past    Smokeless tobacco: Former     Types: Chew     Quit date:     Tobacco comments:     started at 13 yo.   Vaping Use    Vaping Use: Never used   Substance and Sexual Activity    Alcohol use: No    Drug use: Not Currently    Sexual activity: Defer       Medications:     Current Outpatient Medications:     albuterol sulfate  (90 Base) MCG/ACT inhaler, Inhale 2 puffs Every 4 (Four) Hours As Needed for Wheezing., Disp: 18 g, Rfl: 5    allopurinol (ZYLOPRIM) 300 MG tablet, Take 1 tablet by mouth Daily., Disp: 90 tablet, Rfl: 1    amiodarone (PACERONE) 200 MG tablet, Take 1 tablet by mouth Daily., Disp: 90 tablet, Rfl: 1    apixaban (Eliquis) 5 MG tablet tablet, Take 1 tablet by mouth Every 12 (Twelve) Hours., Disp: 180 tablet, Rfl: 3    Ascorbic Acid (VITAMIN C) 500 MG capsule, Take 1 tablet by mouth 4 (four) times a day., Disp: , Rfl:     docusate sodium (COLACE) 100 MG capsule, Take 2 capsules by mouth At Night As Needed., Disp: , Rfl:     Ferrous Gluconate (IRON) 240 (27 FE) MG tablet, Take 1 tablet by mouth Daily., Disp: , Rfl:     finasteride (PROSCAR) 5 MG tablet, Take 1 tablet by mouth every night at bedtime., Disp: 90 tablet, Rfl: 1    furosemide (Lasix) 20 MG tablet, Take 1 tablet by mouth 2 (Two) Times a Day., Disp: 180 tablet, Rfl: 2    metFORMIN (GLUCOPHAGE) 1000 MG tablet, Take 1 tablet by mouth twice daily, Disp: 180 tablet, Rfl: 0    metoprolol tartrate (LOPRESSOR) 50 MG tablet, Take 1 tablet by mouth 2 (Two) Times a Day., Disp: 180 tablet, Rfl: 3    omeprazole (priLOSEC) 20 MG capsule, Take 1 capsule by mouth once daily, Disp: 90 capsule, Rfl: 0    potassium chloride (K-DUR,KLOR-CON) 20 MEQ CR tablet, Take 1 tablet by mouth Daily., Disp: 90 tablet, Rfl: 1    pravastatin (PRAVACHOL) 40 MG tablet, Take 1 tablet by mouth Daily., Disp: 90 tablet, Rfl: 1    Probiotic Product (PROBIOTIC ADVANCED PO),  Take 1 tablet by mouth 2 (Two) Times a Day., Disp: , Rfl:     sertraline (ZOLOFT) 50 MG tablet, Take 1 tablet by mouth once daily, Disp: 90 tablet, Rfl: 0    tamsulosin (FLOMAX) 0.4 MG capsule 24 hr capsule, Take 1 capsule by mouth once daily, Disp: 90 capsule, Rfl: 1    Tiotropium Bromide Monohydrate (Spiriva Respimat) 1.25 MCG/ACT aerosol solution inhaler, Inhale 2 puffs Daily., Disp: 12 g, Rfl: 1    valsartan (DIOVAN) 80 MG tablet, Take 1 tablet by mouth Daily., Disp: 90 tablet, Rfl: 1    cefuroxime (CEFTIN) 500 MG tablet, Take 1 tablet by mouth 2 (Two) Times a Day for 5 days., Disp: 10 tablet, Rfl: 0    tiotropium bromide-olodaterol (STIOLTO RESPIMAT) 2.5-2.5 MCG/ACT aerosol solution inhaler, Inhale 2 puffs Daily. 2 inh once a day, Disp: 1 each, Rfl: 3    Current Facility-Administered Medications:     albuterol sulfate HFA (PROVENTIL HFA;VENTOLIN HFA;PROAIR HFA) inhaler 4 puff, 4 puff, Inhalation, Once, Vikram Eagle MD    Allergies:   Allergies   Allergen Reactions    Xarelto [Rivaroxaban] GI Bleeding     GI bleed    Penicillins Hives     Has tolerated cefepime, ceftriaxone       IPSS Questionnaire (AUA-7):  Over the past month…    1)  Incomplete Emptying:       How often have you had a sensation of not emptying you had the sensation of not emptying your bladder completely after you finished urinating?  2 - Less than half the time   2)  Frequency:       How often have you had the urinate again less than two hours after you finished urinating?  3 - About half the time   3)  Intermittency:       How often have you found you stopped and started again several times when you urinated?   1 - Less than 1 time in 5   4) Urgency:      How often have you found it difficult to postpone urination?  5 - Almost always   5) Weak Stream:      How often have you had a weak urinary stream?  3 - About half the time   6) Straining:       How often have you had to push or strain to begin urination?  0 - Not at all   7)  "Nocturia:      How many times did you most typically get up to urinate from the time you went to bed at night until the time you got up in the morning?  1 - 1 time   Total Score:  15   The International Prostate Symptom Score (IPSS) is used to screen, diagnose, track symptoms of benign prostatic hyperplasia (BPH).   0-7 (Mild Symptoms) 8-19 (Moderate) 20-35 (Severe)   Quality of Life (QoL):  If you were to spend the rest of your life with your urinary condition just the way it is now, how would you feel about that? 2-Mostly Satisfied   Urine Leakage (Incontinence) 0-No Leakage       Sexual Health Inventory for Men (CIARAN)   Over the past 6 months:     1. How do you rate your confidence that you could get and keep an erection?  0 - No sexual activity    2. When you had erections with sexual   stimulation, how often were your erections hard enough for penetration (entering your partner)?  0 - No Sexual Activity    3. During sexual intercourse, how often were you able to maintain your erection after you had penetrated (entered) your partner?  0 - Did not attempt intercourse   4. During sexual intercourse, how difficult was it to maintain your erection to completion of intercourse?  0 - Did not attempt intercourse   5. When you attempted sexual intercourse, how often was it satisfactory for you?  0 - Did not attempt intercourse    Total Score: 0   The Sexual Health Inventory for Men further classifies ED severity with the following breakpoints:   1-7 (Severe ED) 8-11 (Moderate ED) 12-16 (Mild to Moderate ED) 17-21 (Mild ED)      Post void residual bladder scan:   436 mL     Objective     Physical Exam:   Vital Signs:   Vitals:    09/21/23 0924   BP: 136/76   Pulse: 105   SpO2: 92%   Weight: 119 kg (263 lb)   Height: 190.5 cm (75\")     Body mass index is 32.87 kg/m².     Physical Exam  Constitutional:       Appearance: Normal appearance.   HENT:      Head: Normocephalic and atraumatic.      Nose: Nose normal.   Eyes:      " Extraocular Movements: Extraocular movements intact.      Conjunctiva/sclera: Conjunctivae normal.      Pupils: Pupils are equal, round, and reactive to light.   Genitourinary:     Comments: Circumcised phallus, orthotopic meatus, bilaterally descended testicles without masses or induration.  Musculoskeletal:         General: Normal range of motion.      Cervical back: Normal range of motion and neck supple.   Skin:     General: Skin is warm and dry.      Findings: No lesion or rash.   Neurological:      General: No focal deficit present.      Mental Status: He is alert and oriented to person, place, and time. Mental status is at baseline.   Psychiatric:         Mood and Affect: Mood normal.         Behavior: Behavior normal.       Labs:   Brief Urine Lab Results  (Last result in the past 365 days)        Color   Clarity   Blood   Leuk Est   Nitrite   Protein   CREAT   Urine HCG        09/21/23 0945 Yellow   Cloudy   2+   Large (3+)   Negative   Negative                   Urine Culture          8/18/2023    14:30 8/30/2023    11:12 9/21/2023    13:52   Urine Culture   Urine Culture >100,000 CFU/mL Klebsiella pneumoniae ESBL  25,000 CFU/mL Normal Urogenital Kareen  Growth present, too young to evaluate  P      Details         P Preliminary result                Lab Results   Component Value Date    GLUCOSE 109 (H) 08/22/2023    CALCIUM 9.0 08/22/2023     08/22/2023    K 4.7 08/22/2023    CO2 29.0 08/22/2023     08/22/2023    BUN 20 08/22/2023    CREATININE 1.00 08/22/2023    EGFRIFNONA 79 12/16/2021    BCR 20.0 08/22/2023    ANIONGAP 8.0 08/22/2023       Lab Results   Component Value Date    WBC 6.21 08/22/2023    HGB 12.4 (L) 08/22/2023    HCT 39.3 08/22/2023    MCV 95.6 08/22/2023     08/22/2023       Images:   CT Abdomen Pelvis Without Contrast    Result Date: 6/27/2023  Impression: No acute abdominopelvic findings. Redemonstration of mild left-sided hydronephrosis and left hydroureter, without  evidence of ureteral stone or clear obstructing lesion. The appearance is unchanged from prior CT. There is nonobstructing left nephrolithiasis. No acute fracture or traumatic malalignment in the lumbar spine. Multilevel degenerative changes detailed above. Electronically Signed: Andrey Larkin  6/27/2023 2:09 PM EDT  Workstation ID: [id number]    CT Abdomen Pelvis Without Contrast    Result Date: 6/8/2023  1. Stable chronic left severe hydroureter/megaureter 2. Distention of the urinary bladder. Correlate with any difficulty voiding 3. Nonobstructing left nephrolithiasis Electronically Signed: Franky Rivas  6/8/2023 12:36 PM EDT  Workstation ID: [id number]    CT Chest Without Contrast Diagnostic    Result Date: 6/6/2023  Impression: 1.Mildly decreased size of solid left lower lobe nodule, previously subsolid. 2.New linear opacities in the left lower lobe and lingula, consistent with post-treatment changes. Electronically Signed: Alisia Graff  6/6/2023 12:33 PM EDT  Workstation ID: [id number]    CT Lumbar Spine Without Contrast    Result Date: 6/27/2023  Impression: No acute abdominopelvic findings. Redemonstration of mild left-sided hydronephrosis and left hydroureter, without evidence of ureteral stone or clear obstructing lesion. The appearance is unchanged from prior CT. There is nonobstructing left nephrolithiasis. No acute fracture or traumatic malalignment in the lumbar spine. Multilevel degenerative changes detailed above. Electronically Signed: Andrey Larkin  6/27/2023 2:09 PM EDT  Workstation ID: [id number]      Measures:   Tobacco:   Darien Camarena  reports that he quit smoking about 63 years ago. His smoking use included cigarettes. He started smoking about 76 years ago. He smoked an average of 1 pack per day. He has been exposed to tobacco smoke. He quit smokeless tobacco use about 12 years ago.  His smokeless tobacco use included chew.         Assessment / Plan      Assessment/Plan:   Darien Camarena  is a 87 y.o. male who presented today for evaluation of recurrent urinary tract infection and severe left-sided hydroureteronephrosis noted on recent CT imaging.  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  He was managed with a nephrostomy tube for some time but this was removed.  The patient and his daughter report some confusion regarding the necessity and then removal of the nephrostomy tube.  I had a tarah discussion with him, per my examination of his imaging and his clinical history, this patient has a left-sided vesicoureteral reflux secondary to urinary retention which explains his chronic recurrent urinary tract infections and left-sided flank pain.  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  A nephrostomy tube would likely not help this patient if he was having existing ureteral reflux secondary to retention in my opinion.  The first order of business is to manage his urinary retention.  Given his age and comorbidities I recommend he initiate intermittent catheterization 4 times daily to ensure he is emptying.  This will reduce his left-sided hydroureteronephrosis if he keeps his bladder empty, I do not believe there would be much role for a stent in this situation secondary to chronic dilation of the ureter all the way to the level of the bladder.  We will continue to follow him with intermittent catheterization and I will defer to ID for further infection control.  I will treat the patient empirically with cefuroxime today given urinary tract manipulation with catheter and recent urinary tract infections, given possible UTI and sent for culture.  He will return to clinic next week for a focused nurse teaching for intermittent catheterization to ensure he is appropriately performing this at home.    Diagnoses  and all orders for this visit:    1. Urinary retention [R33.9 (ICD-10-CM)] (Primary)    2. Hydronephrosis, left  -     POC Urinalysis Dipstick, Automated    3. Recurrent UTI  -     POC Urinalysis Dipstick, Automated    4. Acute cystitis without hematuria  -     Urine Culture - Urine, Urine, Clean Catch  -     cefuroxime (CEFTIN) 500 MG tablet; Take 1 tablet by mouth 2 (Two) Times a Day for 5 days.  Dispense: 10 tablet; Refill: 0         Follow Up:   Return in about 6 weeks (around 11/2/2023) for F/u next week Mon or Tues for MA nursing visit for self cath teaching.    I spent approximately 45 minutes providing clinical care for this patient; including review of patient's chart and provider documentation, face to face time spent with patient in examination room (obtaining history, performing physical exam, discussing diagnosis and management options), placing orders, and completing patient documentation.     Krystian Larkin MD  [facility] [place]

## 2023-09-25 ENCOUNTER — TELEPHONE (OUTPATIENT)
Dept: UROLOGY | Facility: CLINIC | Age: 88
End: 2023-09-25

## 2023-09-25 DIAGNOSIS — N30.00 ACUTE CYSTITIS WITHOUT HEMATURIA: Primary | ICD-10-CM

## 2023-09-25 LAB — BACTERIA SPEC AEROBE CULT: ABNORMAL

## 2023-09-25 RX ORDER — NITROFURANTOIN 25; 75 MG/1; MG/1
100 CAPSULE ORAL 2 TIMES DAILY
Qty: 14 CAPSULE | Refills: 0 | Status: SHIPPED | OUTPATIENT
Start: 2023-09-25

## 2023-09-25 NOTE — TELEPHONE ENCOUNTER
Shara Peguero at [facility], [place], operated by [facility] lab call in a critical lab. - Urine culture positive for  >100,000 CFU/mL Klebsiella pneumoniae ESBL Abnormal

## 2023-09-25 NOTE — PROGRESS NOTES
Please let Isidro know he has a UTI and I will be sending him a course of Macrobid to pharmacy.     Krystian Larkin MD

## 2023-09-26 ENCOUNTER — CLINICAL SUPPORT (OUTPATIENT)
Dept: UROLOGY | Facility: CLINIC | Age: 88
End: 2023-09-26
Payer: MEDICARE

## 2023-09-26 DIAGNOSIS — R33.9 URINARY RETENTION: Primary | ICD-10-CM

## 2023-09-26 NOTE — PROGRESS NOTES
PT presented today for a follow-up on self catheterization. Pt stated that self cathing has been going well for PT and he has been performing it 2x daily, once in the morning and at night. He also stated that he is able to void better naturally and urine amount voided during self catheterizations is decreasing and PT isn't feeling any symptoms of discomfort. He did say that he is struggling a bit during insertion so I showed him how the blue plastic piece on his catheter can move down the catheter and suggested he try using extra lubricant while inserting to see if that helps the insertion process. PT verbalized that he thinks that will help and he doesn't think he needs to try inserting the cath in office. I provided samples of the lubricant packets for PT to try at home and recommended PT self cath at least 3 times daily to reduce amount of urine in his bladder/ kidneys. PT was agreeable to plan and would like to try in the future to reduce the frequency of cathing to 2x daily while voiding naturally. The PT and I agreed that there was no need for PT to pass the intermittent catheter in office and we held off.

## 2023-09-28 ENCOUNTER — OFFICE VISIT (OUTPATIENT)
Dept: CARDIOLOGY | Facility: CLINIC | Age: 88
End: 2023-09-28
Payer: MEDICARE

## 2023-09-28 VITALS
OXYGEN SATURATION: 91 % | SYSTOLIC BLOOD PRESSURE: 138 MMHG | BODY MASS INDEX: 32.95 KG/M2 | HEART RATE: 114 BPM | WEIGHT: 265 LBS | DIASTOLIC BLOOD PRESSURE: 82 MMHG | HEIGHT: 75 IN

## 2023-09-28 DIAGNOSIS — R31.29 OTHER MICROSCOPIC HEMATURIA: ICD-10-CM

## 2023-09-28 DIAGNOSIS — I48.0 PAROXYSMAL ATRIAL FIBRILLATION: Primary | Chronic | ICD-10-CM

## 2023-09-28 DIAGNOSIS — E78.5 HYPERLIPIDEMIA LDL GOAL <100: Chronic | ICD-10-CM

## 2023-09-28 DIAGNOSIS — I25.119 CORONARY ARTERY DISEASE INVOLVING NATIVE CORONARY ARTERY OF NATIVE HEART WITH ANGINA PECTORIS: ICD-10-CM

## 2023-09-28 DIAGNOSIS — N40.0 ENLARGED PROSTATE WITHOUT LOWER URINARY TRACT SYMPTOMS (LUTS): Chronic | ICD-10-CM

## 2023-09-28 RX ORDER — METOPROLOL TARTRATE 50 MG/1
100 TABLET, FILM COATED ORAL 2 TIMES DAILY
Qty: 180 TABLET | Refills: 3 | Status: SHIPPED | OUTPATIENT
Start: 2023-09-28 | End: 2023-09-28 | Stop reason: DRUGHIGH

## 2023-09-28 RX ORDER — METOPROLOL TARTRATE 100 MG/1
100 TABLET ORAL 2 TIMES DAILY
Qty: 180 TABLET | Refills: 3 | Status: SHIPPED | OUTPATIENT
Start: 2023-09-28

## 2023-09-28 NOTE — ASSESSMENT & PLAN NOTE
Persistent A-fib; has been on amiodarone for couple years and recently attempted cardioversion which was not successful  -Given that he is asymptomatic and not causing cardiomyopathy, would pursue rate control  -Stop amiodarone to limited toxicity  -Increase metoprolol to 100 mg p.o. twice daily  -History of GI bleeding as well as persistent hematuria due to to self-catheterization (BPH), we had extensive discussion regarding alternative therapy to his anticoagulation including left atrial appendage occlusion device implantation.  After risk, benefits, alternatives discussion patient elected to proceed with the procedure.  Plan for Watchman procedure; will start aspirin on the day of the procedure

## 2023-09-28 NOTE — PROGRESS NOTES
Electrophysiology Clinic Consult     Darien Camarena  [id number]  1935    Referring Provider: No ref. provider found   PCP: Pito Way MD  [address] [facility] [address] / [facility] [address]    Date of Service: 09/28/23    Chief Complaint   Patient presents with    Paroxysmal atrial fibrillation    Coronary Artery Disease     Problem List  Persistent atrial fibrillation  Diagnosed August 2012.   FARHAD-guided ECV, 8/17/2012  CHADS-VASc 5 (age > 75, CAD, HTN, DM)  Successful external cardioversion for asymptomatic atrial fibrillation with RVR, 10/25/18  Successful cardioversion for atrial fibrillation RVR, 3/6/19.  Sotalol increased  Minimally symptomatic atrial fibrillation with cardioversion and the ER, 10/31/2019  ED cardioversion for A. fib with RVR, 7/21/2020  ED cardioversion for asymptomatic A. fib with RVR, 12/27/2020   ED cardioversion for asymptomatic A. fib with RVR x 2  occasions, March 2021  Transition from sotalol to amiodarone, 4/14/2021  Successful FARHAD/ECV May 3, 2021: normal LVEF, mild MR  Successful cardioversion, 8/5/2022  Echo 8/22 EF 50%, anatomically and functionally normal valves  Unsuccessful ECV 6/08/2023 - pt converted spontaneously to SR 2 min after ECV  GI Bleed - on Xarelto  CAD   Nuclear MPS 09/15/2022:small-sized, mildly severe area of ischemia located in the apex.  LVEF 56%  [facility] 9/29/2022 - mild CAD  HTN  HLD  DM II  GERD  CKD Stage 3  Gout  STEVEN      History of Present Illness  Darien Camarena is a 87 y.o. male who presents to my electrophysiology clinic for evaluation of atrial fibrillation.  Patient has a history of persistent atrial fibrillation has been on sotalol and amiodarone.  The patient has been on amiodarone for past 2 years or so has unsuccessful cardioversion back in June 2023.  Patient notes that he does not have much symptoms associated with his atrial fibrillation his last ejection fraction was 50%.  Patient does have complication with his  anticoagulation-previously on Xarelto he had a GI bleed.  Now he is self catheterizing himself 3 times a day, and having some hematuria.  Referred to our office for further evaluation regarding A-fib rate control and consideration for left atrial appendage occlusion device implantation    Review of Systems   Constitutional:  Negative for activity change, fatigue and fever.   Respiratory:  Negative for chest tightness and shortness of breath.    Cardiovascular:  Negative for chest pain, palpitations and leg swelling.   Gastrointestinal:  Negative for constipation and diarrhea.   Genitourinary:  Positive for hematuria. Negative for decreased urine volume and difficulty urinating.   Skin:  Negative for wound.   Neurological:  Negative for dizziness, syncope, weakness and light-headedness.   Psychiatric/Behavioral:  Negative for suicidal ideas.      Outpatient Medications Marked as Taking for the 9/28/23 encounter (Office Visit) with Anthony Mcdaniel MD   Medication Sig Dispense Refill    albuterol sulfate  (90 Base) MCG/ACT inhaler Inhale 2 puffs Every 4 (Four) Hours As Needed for Wheezing. 18 g 5    allopurinol (ZYLOPRIM) 300 MG tablet Take 1 tablet by mouth Daily. 90 tablet 1    apixaban (Eliquis) 5 MG tablet tablet Take 1 tablet by mouth Every 12 (Twelve) Hours. 180 tablet 3    Ascorbic Acid (VITAMIN C) 500 MG capsule Take 1 tablet by mouth 4 (four) times a day.      docusate sodium (COLACE) 100 MG capsule Take 2 capsules by mouth At Night As Needed.      Ferrous Gluconate (IRON) 240 (27 FE) MG tablet Take 1 tablet by mouth Daily.      finasteride (PROSCAR) 5 MG tablet Take 1 tablet by mouth every night at bedtime. 90 tablet 1    furosemide (Lasix) 20 MG tablet Take 1 tablet by mouth 2 (Two) Times a Day. 180 tablet 2    metFORMIN (GLUCOPHAGE) 1000 MG tablet Take 1 tablet by mouth twice daily 180 tablet 0    nitrofurantoin, macrocrystal-monohydrate, (Macrobid) 100 MG capsule Take 1 capsule by mouth 2 (Two) Times a  "Day. 14 capsule 0    omeprazole (priLOSEC) 20 MG capsule Take 1 capsule by mouth once daily 90 capsule 0    potassium chloride (K-DUR,KLOR-CON) 20 MEQ CR tablet Take 1 tablet by mouth Daily. 90 tablet 1    pravastatin (PRAVACHOL) 40 MG tablet Take 1 tablet by mouth Daily. 90 tablet 1    Probiotic Product (PROBIOTIC ADVANCED PO) Take 1 tablet by mouth 2 (Two) Times a Day.      sertraline (ZOLOFT) 50 MG tablet Take 1 tablet by mouth once daily 90 tablet 0    tamsulosin (FLOMAX) 0.4 MG capsule 24 hr capsule Take 1 capsule by mouth once daily 90 capsule 1    Tiotropium Bromide Monohydrate (Spiriva Respimat) 1.25 MCG/ACT aerosol solution inhaler Inhale 2 puffs Daily. 12 g 1    tiotropium bromide-olodaterol (STIOLTO RESPIMAT) 2.5-2.5 MCG/ACT aerosol solution inhaler Inhale 2 puffs Daily. 2 inh once a day 1 each 3    valsartan (DIOVAN) 80 MG tablet Take 1 tablet by mouth Daily. 90 tablet 1    [DISCONTINUED] amiodarone (PACERONE) 200 MG tablet Take 1 tablet by mouth Daily. 90 tablet 1    [DISCONTINUED] metoprolol tartrate (LOPRESSOR) 50 MG tablet Take 1 tablet by mouth 2 (Two) Times a Day. 180 tablet 3    [DISCONTINUED] metoprolol tartrate (LOPRESSOR) 50 MG tablet Take 2 tablets by mouth 2 (Two) Times a Day. 180 tablet 3     Current Facility-Administered Medications for the 9/28/23 encounter (Office Visit) with Anthony Mcdaniel MD   Medication Dose Route Frequency Provider Last Rate Last Admin    albuterol sulfate HFA (PROVENTIL HFA;VENTOLIN HFA;PROAIR HFA) inhaler 4 puff  4 puff Inhalation Once Vikram Eagle MD           Physical Exam  Vitals:    09/28/23 1510   BP: 138/82   BP Location: Left arm   Patient Position: Sitting   Pulse: 114   SpO2: 91%   Weight: 120 kg (265 lb)   Height: 190.5 cm (75\")     GENERAL: Well-developed, well-nourished patient in no acute distress.  HEENT: NC, AC, PERRLA. MMM  NECK: No JVD. No carotid bruits auscultated.  LUNGS: Clear to auscultation bilaterally.  CARDIOVASCULAR: IRIR No murmurs, " gallops or rubs noted.   ABDOMEN: Soft, nontender. Positive bowel sounds.  MUSCULOSKELETAL: No gross deformities. No clubbing, cyanosis  EXT: pulses intact, No edema  SKIN: Pink, warm  Neuro: Nonfocal exam. Gait intact    Diagnostic Data    ECG 12 Lead    Date/Time: 9/28/2023 3:53 PM  Performed by: Anthony Mcdaniel MD  Authorized by: Anthony Mcdaniel MD   Rhythm: atrial fibrillation  Rate: tachycardic  QRS axis: right    Clinical impression: abnormal EKG        Lab Results   Component Value Date    GLUCOSE 109 (H) 08/22/2023    CALCIUM 9.0 08/22/2023     08/22/2023    K 4.7 08/22/2023    CO2 29.0 08/22/2023     08/22/2023    BUN 20 08/22/2023    CREATININE 1.00 08/22/2023    EGFRIFNONA 79 12/16/2021    BCR 20.0 08/22/2023    ANIONGAP 8.0 08/22/2023     Lab Results   Component Value Date    WBC 6.21 08/22/2023    HGB 12.4 (L) 08/22/2023    HCT 39.3 08/22/2023    MCV 95.6 08/22/2023     08/22/2023     Lab Results   Component Value Date    INR 1.92 (H) 06/21/2022    PROTIME 22.0 (H) 06/21/2022     Lab Results   Component Value Date    TSH 8.810 (H) 01/24/2023       Cardiac Testing:  TTE 9/2022: normal cardiac function 50%    I personally viewed and interpreted the patient's EKG/Telemetry/lab data      Assessment and Plan   Diagnoses and all orders for this visit:    1. Paroxysmal atrial fibrillation (Primary)  Assessment & Plan:  Persistent A-fib; has been on amiodarone for couple years and recently attempted cardioversion which was not successful  -Given that he is asymptomatic and not causing cardiomyopathy, would pursue rate control  -Stop amiodarone to limited toxicity  -Increase metoprolol to 100 mg p.o. twice daily  -History of GI bleeding as well as persistent hematuria due to to self-catheterization (BPH), we had extensive discussion regarding alternative therapy to his anticoagulation including left atrial appendage occlusion device implantation.  After risk, benefits, alternatives discussion  patient elected to proceed with the procedure.  Plan for Watchman procedure; will start aspirin on the day of the procedure    Orders:  -     Discontinue: metoprolol tartrate (LOPRESSOR) 50 MG tablet; Take 2 tablets by mouth 2 (Two) Times a Day.  Dispense: 180 tablet; Refill: 3  -     ECG 12 Lead    2. Other microscopic hematuria  -     ECG 12 Lead    3. Enlarged prostate without lower urinary tract symptoms (luts)  -     ECG 12 Lead    4. Coronary artery disease involving native coronary artery of native heart with angina pectoris  -     ECG 12 Lead    5. Hyperlipidemia LDL goal <100    Other orders  -     metoprolol tartrate (LOPRESSOR) 100 MG tablet; Take 1 tablet by mouth 2 (Two) Times a Day.  Dispense: 180 tablet; Refill: 3      Body mass index is 33.12 kg/m².        Follow Up  Return in about 3 months (around 12/28/2023).    Thank you for allowing me to participate in the care of your patient. Please to not hesitate to contact me with additional questions or concerns.        Anthony Mcdaniel MD  Cardiac Electrophysiologist  [facility] / [facility]

## 2023-10-02 ENCOUNTER — HOSPITAL ENCOUNTER (OUTPATIENT)
Dept: CT IMAGING | Facility: HOSPITAL | Age: 88
Discharge: HOME | End: 2023-10-02
Admitting: RADIOLOGY
Payer: MEDICARE

## 2023-10-02 DIAGNOSIS — C34.32 MALIGNANT NEOPLASM OF LOWER LOBE OF LEFT LUNG: ICD-10-CM

## 2023-10-02 PROCEDURE — 71250 CT THORAX DX C-: CPT

## 2023-10-04 ENCOUNTER — HOSPITAL ENCOUNTER (OUTPATIENT)
Dept: RADIATION ONCOLOGY | Facility: HOSPITAL | Age: 88
Setting detail: RADIATION/ONCOLOGY SERIES
Discharge: HOME | End: 2023-10-04
Payer: MEDICARE

## 2023-10-04 ENCOUNTER — OFFICE VISIT (OUTPATIENT)
Dept: RADIATION ONCOLOGY | Facility: HOSPITAL | Age: 88
End: 2023-10-04
Payer: MEDICARE

## 2023-10-04 VITALS
SYSTOLIC BLOOD PRESSURE: 161 MMHG | HEART RATE: 113 BPM | DIASTOLIC BLOOD PRESSURE: 102 MMHG | OXYGEN SATURATION: 97 % | WEIGHT: 265.8 LBS | BODY MASS INDEX: 33.22 KG/M2 | TEMPERATURE: 97.8 F | RESPIRATION RATE: 16 BRPM

## 2023-10-04 DIAGNOSIS — C34.32 MALIGNANT NEOPLASM OF LOWER LOBE OF LEFT LUNG: Primary | ICD-10-CM

## 2023-10-04 PROCEDURE — G0463 HOSPITAL OUTPT CLINIC VISIT: HCPCS

## 2023-10-04 NOTE — PROGRESS NOTES
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 male with multiple medical comorbidities including COPD, A. Fib on Eliquis, hypertension, CKD, and remote tobacco abuse who was incidentally found to have a 2.2 cm left lower lobe pulmonary nodule on CT scan of the chest for work-up of acute hypoxic respiratory failure secondary to Influenza A infection.  The patient was sent for outpatient PET scan, showing the subsolid left lower lobe nodule to be mildly hypermetabolic and concerning for primary malignancy.  There was otherwise no evidence of hypermetabolic hilar/mediastinal lymphadenopathy or distant metastatic disease.  The patient was uninterested in risks associated with biopsy.  The patient was not considered a candidate for surgery.  The patient underwent definitive empiric treatment with a course of SBRT consisting of 50 Gy in 5 fractions delievered on the ProFibrix Radixact.  He completed treatments 2023.         The patient is having no new respiratory symptoms.  He is having issues with his hearing aids and his prostate.  He is required to do self cathing 3 times a day now.      MEDICATIONS: Medication reconciliation for the patient was reviewed and confirmed in the electronic medical record.    Review of Systems   Respiratory:  Positive for cough and shortness of breath.    Cardiovascular:  Positive for leg swelling (improved).   All other systems reviewed and are negative.    Karnofsky score: 80   KPS 80%        Physical Exam  Vitals and nursing note reviewed.   Constitutional:       General: He is not  in acute distress.     Appearance: Normal appearance. He is well-developed.   HENT:      Head: Normocephalic and atraumatic.   Eyes:      Conjunctiva/sclera: Conjunctivae normal.      Pupils: Pupils are equal, round, and reactive to light.   Cardiovascular:      Rate and Rhythm: Normal rate and regular rhythm.      Heart sounds: No murmur heard.    No friction rub.   Pulmonary:      Effort: Pulmonary effort is normal.      Breath sounds: Normal breath sounds. No wheezing.   Chest:      Chest wall: No tenderness.   Musculoskeletal:         General: Normal range of motion.      Cervical back: Normal range of motion and neck supple.   Lymphadenopathy:      Cervical: No cervical adenopathy.   Skin:     General: Skin is warm and dry.   Neurological:      Mental Status: He is alert and oriented to person, place, and time.   Psychiatric:         Behavior: Behavior normal.         Thought Content: Thought content normal.         Judgment: Judgment normal.       VITAL SIGNS:   Vitals:    10/04/23 0928   BP: (!) 161/102   Pulse: 113   Resp: 16   Temp: 97.8 °F (36.6 °C)   TempSrc: Temporal   SpO2: 97%   Weight: 121 kg (265 lb 12.8 oz)   PainSc: 0-No pain           The following portions of the patient's history were reviewed and updated as appropriate: allergies, current medications, past family history, past medical history, past social history, past surgical history and problem list.    CT Abdomen Pelvis Without Contrast    Result Date: 6/27/2023  Impression: No acute abdominopelvic findings. Redemonstration of mild left-sided hydronephrosis and left hydroureter, without evidence of ureteral stone or clear obstructing lesion. The appearance is unchanged from prior CT. There is nonobstructing left nephrolithiasis. No acute fracture or traumatic malalignment in the lumbar spine. Multilevel degenerative changes detailed above. Electronically Signed: Andrey Larkin  6/27/2023 2:09 PM EDT  Workstation ID: [id number]    CT Abdomen  Pelvis Without Contrast    Result Date: 6/8/2023  1. Stable chronic left severe hydroureter/megaureter 2. Distention of the urinary bladder. Correlate with any difficulty voiding 3. Nonobstructing left nephrolithiasis Electronically Signed: Franky Rivas  6/8/2023 12:36 PM EDT  Workstation ID: [id number]    CT Chest Without Contrast Diagnostic    Result Date: 10/2/2023  Impression: 1. No change in left lower lobe pulmonary nodule measuring 14 mm, previously 13 mm. There is surrounding groundglass airspace disease which is unchanged which may be due to treatment change. 2. Interval decrease in size of right paratracheal lymph node. 3. Large amount of coronary artery calcification. 4. 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 Air related to recent instrumentation or Del Cid catheter could also give this appearance. Clinical correlation recommended. Electronically Signed: Jose L Olea MD  10/2/2023 4:00 PM EDT  Workstation ID: [id number]    CT Lumbar Spine Without Contrast    Result Date: 6/27/2023  Impression: No acute abdominopelvic findings. Redemonstration of mild left-sided hydronephrosis and left hydroureter, without evidence of ureteral stone or clear obstructing lesion. The appearance is unchanged from prior CT. There is nonobstructing left nephrolithiasis. No acute fracture or traumatic malalignment in the lumbar spine. Multilevel degenerative changes detailed above. Electronically Signed: Andrey Larkin  6/27/2023 2:09 PM EDT  Workstation ID: [id number]        I reviewed the patient's pretreatment scans as well as the patient's treatment planning.  There are no diagnoses linked to this encounter.       IMPRESSION:  Darien Camarena is an 87 y.o. gentleman with suspected early stage primary bronchogenic carcinoma of the left lower lobe of lung.  The patient was uninterested in biopsy.  The patient underwent definitive, empiric SBRT to this lesion.  The  patient had a good response with evidence of fibrosis around the lesion.    Recommend repeat CT scan in 6 months.      I spent 30 minutes on the patient's case today.    RECOMMENDATIONS:    Left lower lobe primary bronchogenic carcinoma  -cT1b N0 M0   -Radiographic diagnosis  -Hypermetabolic on PET scan  -No evidence of regional or distant metastatic disease  -Patient uninterested in biopsy  -Status post definitive empiric SBRT on the Radixact, 50 Gy in 5 fractions   -completed 2/24/2023  -CT scan shows no evidence of disease progression with interval treatment response.  -Clinic follow-up with repeat CT scan in 4 months    COPD  -Continues daily Stilolto, albuterol as needed  -Follows with Dr. Eagle    No follow-ups on file.    Abbe Leary MD

## 2023-10-18 DIAGNOSIS — I48.0 PAROXYSMAL ATRIAL FIBRILLATION: Primary | Chronic | ICD-10-CM

## 2023-10-18 DIAGNOSIS — Z87.19 H/O: GI BLEED: ICD-10-CM

## 2023-10-19 PROBLEM — Z87.19 H/O: GI BLEED: Status: ACTIVE | Noted: 2023-10-18

## 2023-10-30 ENCOUNTER — OFFICE VISIT (OUTPATIENT)
Dept: INTERNAL MEDICINE | Facility: CLINIC | Age: 88
End: 2023-10-30
Payer: MEDICARE

## 2023-10-30 VITALS
SYSTOLIC BLOOD PRESSURE: 132 MMHG | TEMPERATURE: 97.5 F | HEART RATE: 92 BPM | WEIGHT: 266 LBS | DIASTOLIC BLOOD PRESSURE: 84 MMHG | BODY MASS INDEX: 33.25 KG/M2 | RESPIRATION RATE: 18 BRPM

## 2023-10-30 DIAGNOSIS — R41.3 MEMORY LOSS: ICD-10-CM

## 2023-10-30 DIAGNOSIS — E11.9 TYPE 2 DIABETES MELLITUS WITHOUT COMPLICATION, WITHOUT LONG-TERM CURRENT USE OF INSULIN: Primary | ICD-10-CM

## 2023-10-30 DIAGNOSIS — Z23 IMMUNIZATION DUE: ICD-10-CM

## 2023-10-30 DIAGNOSIS — R82.81 PYURIA: ICD-10-CM

## 2023-10-30 DIAGNOSIS — F32.9 REACTIVE DEPRESSION: ICD-10-CM

## 2023-10-30 DIAGNOSIS — I10 ESSENTIAL HYPERTENSION: ICD-10-CM

## 2023-10-30 DIAGNOSIS — K21.00 GASTROESOPHAGEAL REFLUX DISEASE WITH ESOPHAGITIS WITHOUT HEMORRHAGE: ICD-10-CM

## 2023-10-30 DIAGNOSIS — E78.5 HYPERLIPIDEMIA, UNSPECIFIED HYPERLIPIDEMIA TYPE: ICD-10-CM

## 2023-10-30 LAB
A/C: NORMAL
BILIRUB BLD-MCNC: NEGATIVE MG/DL
CLARITY, POC: ABNORMAL
COLOR UR: YELLOW
EXPIRATION DATE: ABNORMAL
EXPIRATION DATE: NORMAL
GLUCOSE UR STRIP-MCNC: NEGATIVE MG/DL
KETONES UR QL: NEGATIVE
LEUKOCYTE EST, POC: ABNORMAL
Lab: ABNORMAL
Lab: NORMAL
NITRITE UR-MCNC: POSITIVE MG/ML
PH UR: 6 [PH] (ref 5–8)
POC CREATININE URINE: NORMAL
POC MICROALBUMIN URINE: NORMAL
PROT UR STRIP-MCNC: ABNORMAL MG/DL
RBC # UR STRIP: ABNORMAL /UL
SP GR UR: 1.02 (ref 1–1.03)
UROBILINOGEN UR QL: NORMAL

## 2023-10-30 PROCEDURE — 87077 CULTURE AEROBIC IDENTIFY: CPT | Performed by: INTERNAL MEDICINE

## 2023-10-30 PROCEDURE — 80061 LIPID PANEL: CPT | Performed by: INTERNAL MEDICINE

## 2023-10-30 PROCEDURE — 80053 COMPREHEN METABOLIC PANEL: CPT | Performed by: INTERNAL MEDICINE

## 2023-10-30 PROCEDURE — 84443 ASSAY THYROID STIM HORMONE: CPT | Performed by: INTERNAL MEDICINE

## 2023-10-30 PROCEDURE — 85025 COMPLETE CBC W/AUTO DIFF WBC: CPT | Performed by: INTERNAL MEDICINE

## 2023-10-30 PROCEDURE — 82746 ASSAY OF FOLIC ACID SERUM: CPT | Performed by: INTERNAL MEDICINE

## 2023-10-30 PROCEDURE — 84439 ASSAY OF FREE THYROXINE: CPT | Performed by: INTERNAL MEDICINE

## 2023-10-30 PROCEDURE — 82607 VITAMIN B-12: CPT | Performed by: INTERNAL MEDICINE

## 2023-10-30 PROCEDURE — 83036 HEMOGLOBIN GLYCOSYLATED A1C: CPT | Performed by: INTERNAL MEDICINE

## 2023-10-30 PROCEDURE — 87186 SC STD MICRODIL/AGAR DIL: CPT | Performed by: INTERNAL MEDICINE

## 2023-10-30 PROCEDURE — 87086 URINE CULTURE/COLONY COUNT: CPT | Performed by: INTERNAL MEDICINE

## 2023-10-30 PROCEDURE — 86592 SYPHILIS TEST NON-TREP QUAL: CPT | Performed by: INTERNAL MEDICINE

## 2023-10-30 RX ORDER — DONEPEZIL HYDROCHLORIDE 10 MG/1
10 TABLET, FILM COATED ORAL NIGHTLY
Qty: 30 TABLET | Refills: 2 | Status: SHIPPED | OUTPATIENT
Start: 2023-10-30

## 2023-10-30 NOTE — PROGRESS NOTES
Chief Complaint   Patient presents with    Follow-up     4 month follow up chronic medical problems       History of Present Illness      The patient presents for a follow-up related to Type 2 Diabetes Mellitus. He does not check his blood sugars at home. He denies hypoglycemic symptoms. The patient denies polyuria, polydypsia or polyphagia. He reports no symptoms of neuropathy. The patient takes his medication as prescribed. He is not taking insulin. The patient does check his feet daily at home. He denies chest pain, shortness of breath, orthopnea, paroxysmal nocturnal dyspnea, dyspnea on exertion, edema, palpitations or syncope.    The patient presents for a follow-up related to hypertension. The patient reports that he has had no headaches or blurred vision. He states that he is taking his medication as prescribed. He is not having medication side effects.    The patient presents for a follow-up related to GERD. The patient is on omeprazole for his gastroesophageal reflux. The medication is taken on a regular basis and gives complete relief of the symptoms. He reports no abdominal pain, belching, diarrhea, dysphagia, early satiety, heartburn, hoarseness, nausea, odynophagia, rectal bleeding, vomiting or weight loss. The GERD has no known aggravating factors.    The patient presents for a follow-up related to hyperlipidemia. He is following a low fat diet. He reports that he is exercising. He is taking pravastatin. The patient is taking his medication as prescribed. He reports no medication side effects, including muscle cramps, abdominal pain, headaches or weakness.    The patient presents for a follow-up related to depression. He denies currently having depression symptoms. He denies suicidal ideation. His energy level is good. He denies agorophobia. He sleeps well. He is not tearful. He states that his current depression symptoms are stable. He is currently taking a medication. The current medication regimen  consists of sertraline. The patient denies having medication side effects including nausea, headaches, anxiety, increased depression or fatigue.    The patient complains of memory loss over 6 months. The primary type of memory affected is learning and retention. The patient still drives. He states that he does not get lost. There are symptoms of depression.    Review of Systems    PULMONARY- Denies Wheezing, Sputum Production, Cough, Hemoptysis or Pleuritic Chest Pain.    CARDIOVASCULAR- Denies Claudication or Irregular Heart Beat.    NEUROLOGIC- Reports: Memory Loss and Depression. Denies: Seizures or Confusion.    Medications      Current Outpatient Medications:     albuterol sulfate  (90 Base) MCG/ACT inhaler, Inhale 2 puffs Every 4 (Four) Hours As Needed for Wheezing., Disp: 18 g, Rfl: 5    allopurinol (ZYLOPRIM) 300 MG tablet, Take 1 tablet by mouth Daily., Disp: 90 tablet, Rfl: 1    apixaban (Eliquis) 5 MG tablet tablet, Take 1 tablet by mouth Every 12 (Twelve) Hours., Disp: 180 tablet, Rfl: 3    Ascorbic Acid (VITAMIN C) 500 MG capsule, Take 1 tablet by mouth 4 (four) times a day., Disp: , Rfl:     docusate sodium (COLACE) 100 MG capsule, Take 2 capsules by mouth At Night As Needed., Disp: , Rfl:     Ferrous Gluconate (IRON) 240 (27 FE) MG tablet, Take 1 tablet by mouth Daily., Disp: , Rfl:     finasteride (PROSCAR) 5 MG tablet, Take 1 tablet by mouth every night at bedtime., Disp: 90 tablet, Rfl: 1    furosemide (Lasix) 20 MG tablet, Take 1 tablet by mouth 2 (Two) Times a Day., Disp: 180 tablet, Rfl: 2    metFORMIN (GLUCOPHAGE) 1000 MG tablet, Take 1 tablet by mouth twice daily, Disp: 180 tablet, Rfl: 1    metoprolol tartrate (LOPRESSOR) 100 MG tablet, Take 1 tablet by mouth 2 (Two) Times a Day., Disp: 180 tablet, Rfl: 3    omeprazole (priLOSEC) 20 MG capsule, Take 1 capsule by mouth once daily, Disp: 90 capsule, Rfl: 0    potassium chloride (K-DUR,KLOR-CON) 20 MEQ CR tablet, Take 1 tablet by mouth  Daily., Disp: 90 tablet, Rfl: 1    pravastatin (PRAVACHOL) 40 MG tablet, Take 1 tablet by mouth Daily., Disp: 90 tablet, Rfl: 1    Probiotic Product (PROBIOTIC ADVANCED PO), Take 1 tablet by mouth 2 (Two) Times a Day., Disp: , Rfl:     sertraline (ZOLOFT) 50 MG tablet, Take 1 tablet by mouth once daily, Disp: 90 tablet, Rfl: 0    tamsulosin (FLOMAX) 0.4 MG capsule 24 hr capsule, Take 1 capsule by mouth once daily, Disp: 90 capsule, Rfl: 1    Tiotropium Bromide Monohydrate (Spiriva Respimat) 1.25 MCG/ACT aerosol solution inhaler, Inhale 2 puffs Daily., Disp: 12 g, Rfl: 1    tiotropium bromide-olodaterol (STIOLTO RESPIMAT) 2.5-2.5 MCG/ACT aerosol solution inhaler, Inhale 2 puffs Daily. 2 inh once a day, Disp: 1 each, Rfl: 3    valsartan (DIOVAN) 80 MG tablet, Take 1 tablet by mouth Daily., Disp: 90 tablet, Rfl: 1    donepezil (Aricept) 10 MG tablet, Take 1 tablet by mouth Every Night., Disp: 30 tablet, Rfl: 2    Current Facility-Administered Medications:     albuterol sulfate HFA (PROVENTIL HFA;VENTOLIN HFA;PROAIR HFA) inhaler 4 puff, 4 puff, Inhalation, Once, Vikram Eagle MD     Allergies    Allergies   Allergen Reactions    Xarelto [Rivaroxaban] GI Bleeding     GI bleed    Penicillins Hives     Has tolerated cefepime, ceftriaxone       Problem List    Patient Active Problem List   Diagnosis    Atopic rhinitis    Benign (familial) paraproteinemia    Type 2 diabetes mellitus with stage 3 chronic kidney disease, without long-term current use of insulin    Enlarged prostate without lower urinary tract symptoms (luts)    Gastroesophageal reflux disease with esophagitis    Polyp of gallbladder    Gout    Hyperlipidemia LDL goal <100    Essential hypertension    Nephrolithiasis    Obstructive sleep apnea syndrome    Paroxysmal atrial fibrillation    Stage 3 chronic kidney disease    Long term current use of antiarrhythmic medical therapy    Hydronephrosis    Coronary artery disease involving native coronary artery of  native heart with angina pectoris    Malignant neoplasm of lower lobe of left lung    Chronic bilateral low back pain without sciatica    Other microscopic hematuria    H/O: GI bleed       Medications, Allergies, Problems List and Past History were reviewed and updated.    Physical Examination    /84 (BP Location: Left arm, Patient Position: Sitting, Cuff Size: Adult)   Pulse 92   Temp 97.5 °F (36.4 °C) (Infrared)   Resp 18   Wt 121 kg (266 lb)   BMI 33.25 kg/m²       HEENT: Facies- Within normal limits. Lids- Normal bilaterally. Conjunctiva- Clear bilaterally. Sclera- Anicteric bilaterally.    Neck: Thyroid- non enlarged, symmetric and has no nodules. No bruits are detected.    Lungs: Auscultation- Clear to auscultation bilaterally. There are no retractions, clubbing or cyanosis. The Expiratory to Inspiratory ratio is equal.    Lymph Nodes: Cervical- no enlarged lymph nodes noted.    Cardiovascular: There are no carotid bruits. Heart- Normal Rate with Regular rhythm and no murmurs. There are no gallops. There are no rubs. In the lower extremities there is no edema. The upper extremities do not have edema.    Abdomen: Soft, benign, non-tender with no masses, hernias, organomegaly or scars.    Neurologic Exam:    Cranial Nerves II-XII: Intact    MMSE: 19/30.    Gait: Normal.    Impression and Assessment    Encounter for Immunization Administration.    Type 2 Diabetes Mellitus without complication without insulin usage.    Essential Hypertension.    Gastroesophageal Reflux Disease.    Hyperlipidemia.    Reactive Depression.    Memory Loss.    Plan    Gastroesophageal Reflux Disease Plan: The patient was instructed to continue the current medications.    Essential Hypertension Plan: The patient was instructed to continue the current medications.    Hyperlipidemia Plan: The patient was instructed to exercise daily, eat a low fat diet and continue his medications.    Type 2 Diabetes Mellitus without  complication without insulin usage Plan: The patient was instructed to continue the current medications.    Reactive Depression Plan: The patient was instructed to continue the current medications.    Memory Loss Plan: Further plans will be made after testing. Started Aricept 10 mg. Take 1/2 tablet daily for 10 days and then 1 tablet daily. An MRI brain will be obtained. Consider not driving. A multivitamin (Centrum Silver) was encouraged. Keep brain active with puzzles, stimulation, reading.    Counseled regarding immunizations and applicable VIS given.    Immunizations Ordered and Administered: Fluzone High Dose 65+ years.    Diagnoses and all orders for this visit:    1. Type 2 diabetes mellitus without complication, without long-term current use of insulin (Primary)  -     Comprehensive Metabolic Panel; Future  -     Hemoglobin A1c; Future  -     POC Microalbumin; Future  -     POC Microalbumin  -     Hemoglobin A1c  -     Comprehensive Metabolic Panel    2. Essential hypertension  -     CBC & Differential; Future  -     Comprehensive Metabolic Panel; Future  -     POC Urinalysis Dipstick, Automated; Future  -     POC Urinalysis Dipstick, Automated  -     Comprehensive Metabolic Panel  -     CBC & Differential    3. Gastroesophageal reflux disease with esophagitis without hemorrhage    4. Hyperlipidemia, unspecified hyperlipidemia type  -     Comprehensive Metabolic Panel; Future  -     Lipid Panel; Future  -     Lipid Panel  -     Comprehensive Metabolic Panel    5. Reactive depression    6. Memory loss  -     CBC & Differential; Future  -     Folate; Future  -     TSH; Future  -     T4, Free; Future  -     RPR; Future  -     Vitamin B12; Future  -     MRI Brain With & Without Contrast; Future  -     donepezil (Aricept) 10 MG tablet; Take 1 tablet by mouth Every Night.  Dispense: 30 tablet; Refill: 2  -     Vitamin B12  -     RPR  -     T4, Free  -     TSH  -     Folate  -     CBC & Differential    7.  Immunization due  -     Fluzone High-Dose 65+yrs    8. Pyuria  -     Urine Culture - Urine, Urine, Clean Catch; Future  -     Urine Culture - Urine, Urine, Clean Catch        Return to Office    The patient was instructed to return for follow-up in 1 month. The patient was instructed to return sooner if the condition changes, worsens, or does not resolve.

## 2023-10-31 LAB
ALBUMIN SERPL-MCNC: 4 G/DL (ref 3.5–5.2)
ALBUMIN/GLOB SERPL: 1.1 G/DL
ALP SERPL-CCNC: 110 U/L (ref 39–117)
ALT SERPL W P-5'-P-CCNC: 19 U/L (ref 1–41)
ANION GAP SERPL CALCULATED.3IONS-SCNC: 8 MMOL/L (ref 5–15)
AST SERPL-CCNC: 22 U/L (ref 1–40)
BASOPHILS # BLD AUTO: 0.06 10*3/MM3 (ref 0–0.2)
BASOPHILS NFR BLD AUTO: 0.9 % (ref 0–1.5)
BILIRUB SERPL-MCNC: 1.1 MG/DL (ref 0–1.2)
BUN SERPL-MCNC: 21 MG/DL (ref 8–23)
BUN/CREAT SERPL: 20 (ref 7–25)
CALCIUM SPEC-SCNC: 9.5 MG/DL (ref 8.6–10.5)
CHLORIDE SERPL-SCNC: 102 MMOL/L (ref 98–107)
CHOLEST SERPL-MCNC: 98 MG/DL (ref 0–200)
CO2 SERPL-SCNC: 30 MMOL/L (ref 22–29)
CREAT SERPL-MCNC: 1.05 MG/DL (ref 0.76–1.27)
DEPRECATED RDW RBC AUTO: 50 FL (ref 37–54)
EGFRCR SERPLBLD CKD-EPI 2021: 68.7 ML/MIN/1.73
EOSINOPHIL # BLD AUTO: 0.27 10*3/MM3 (ref 0–0.4)
EOSINOPHIL NFR BLD AUTO: 4.1 % (ref 0.3–6.2)
ERYTHROCYTE [DISTWIDTH] IN BLOOD BY AUTOMATED COUNT: 14.4 % (ref 12.3–15.4)
FOLATE SERPL-MCNC: >20 NG/ML (ref 4.78–24.2)
GLOBULIN UR ELPH-MCNC: 3.6 GM/DL
GLUCOSE SERPL-MCNC: 101 MG/DL (ref 65–99)
HBA1C MFR BLD: 6.2 % (ref 4.8–5.6)
HCT VFR BLD AUTO: 45.3 % (ref 37.5–51)
HDLC SERPL-MCNC: 34 MG/DL (ref 40–60)
HGB BLD-MCNC: 15 G/DL (ref 13–17.7)
LDLC SERPL CALC-MCNC: 41 MG/DL (ref 0–100)
LDLC/HDLC SERPL: 1.14 {RATIO}
LYMPHOCYTES # BLD AUTO: 1.47 10*3/MM3 (ref 0.7–3.1)
LYMPHOCYTES NFR BLD AUTO: 22.5 % (ref 19.6–45.3)
MCH RBC QN AUTO: 31.4 PG (ref 26.6–33)
MCHC RBC AUTO-ENTMCNC: 33.1 G/DL (ref 31.5–35.7)
MCV RBC AUTO: 94.8 FL (ref 79–97)
MONOCYTES # BLD AUTO: 0.6 10*3/MM3 (ref 0.1–0.9)
MONOCYTES NFR BLD AUTO: 9.2 % (ref 5–12)
NEUTROPHILS NFR BLD AUTO: 4.12 10*3/MM3 (ref 1.7–7)
NEUTROPHILS NFR BLD AUTO: 63.1 % (ref 42.7–76)
PLATELET # BLD AUTO: 148 10*3/MM3 (ref 140–450)
PMV BLD AUTO: 10.7 FL (ref 6–12)
POTASSIUM SERPL-SCNC: 4 MMOL/L (ref 3.5–5.2)
PROT SERPL-MCNC: 7.6 G/DL (ref 6–8.5)
RBC # BLD AUTO: 4.78 10*6/MM3 (ref 4.14–5.8)
RPR SER QL: NORMAL
SODIUM SERPL-SCNC: 140 MMOL/L (ref 136–145)
T4 FREE SERPL-MCNC: 0.97 NG/DL (ref 0.93–1.7)
TRIGL SERPL-MCNC: 127 MG/DL (ref 0–150)
TSH SERPL DL<=0.05 MIU/L-ACNC: 7.97 UIU/ML (ref 0.27–4.2)
VIT B12 BLD-MCNC: 441 PG/ML (ref 211–946)
VLDLC SERPL-MCNC: 23 MG/DL (ref 5–40)
WBC NRBC COR # BLD: 6.53 10*3/MM3 (ref 3.4–10.8)

## 2023-11-01 LAB — BACTERIA SPEC AEROBE CULT: ABNORMAL

## 2023-11-02 ENCOUNTER — PREP FOR SURGERY (OUTPATIENT)
Dept: OTHER | Facility: HOSPITAL | Age: 88
End: 2023-11-02
Payer: MEDICARE

## 2023-11-02 DIAGNOSIS — I48.0 PAROXYSMAL ATRIAL FIBRILLATION: Primary | Chronic | ICD-10-CM

## 2023-11-02 RX ORDER — SODIUM CHLORIDE 0.9 % (FLUSH) 0.9 %
10 SYRINGE (ML) INJECTION AS NEEDED
OUTPATIENT
Start: 2023-11-02

## 2023-11-02 RX ORDER — CEFAZOLIN SODIUM 2 G/100ML
2 INJECTION, SOLUTION INTRAVENOUS ONCE
OUTPATIENT
Start: 2023-11-02 | End: 2023-11-02

## 2023-11-02 RX ORDER — ONDANSETRON 2 MG/ML
4 INJECTION INTRAMUSCULAR; INTRAVENOUS EVERY 6 HOURS PRN
OUTPATIENT
Start: 2023-11-02

## 2023-11-02 RX ORDER — NITROGLYCERIN 0.4 MG/1
0.4 TABLET SUBLINGUAL
OUTPATIENT
Start: 2023-11-02

## 2023-11-02 RX ORDER — SODIUM CHLORIDE 9 MG/ML
40 INJECTION, SOLUTION INTRAVENOUS AS NEEDED
OUTPATIENT
Start: 2023-11-02

## 2023-11-02 RX ORDER — SODIUM CHLORIDE 0.9 % (FLUSH) 0.9 %
3 SYRINGE (ML) INJECTION EVERY 12 HOURS SCHEDULED
OUTPATIENT
Start: 2023-11-02

## 2023-11-02 RX ORDER — LEVOFLOXACIN 500 MG/1
500 TABLET, FILM COATED ORAL DAILY
Qty: 10 TABLET | Refills: 0 | Status: SHIPPED | OUTPATIENT
Start: 2023-11-02

## 2023-11-02 RX ORDER — ACETAMINOPHEN 325 MG/1
650 TABLET ORAL EVERY 4 HOURS PRN
OUTPATIENT
Start: 2023-11-02

## 2023-11-03 ENCOUNTER — TELEPHONE (OUTPATIENT)
Dept: INTERNAL MEDICINE | Facility: CLINIC | Age: 88
End: 2023-11-03

## 2023-11-03 ENCOUNTER — TELEPHONE (OUTPATIENT)
Dept: UROLOGY | Facility: CLINIC | Age: 88
End: 2023-11-03

## 2023-11-03 ENCOUNTER — OFFICE VISIT (OUTPATIENT)
Dept: UROLOGY | Facility: CLINIC | Age: 88
End: 2023-11-03
Payer: MEDICARE

## 2023-11-03 VITALS — WEIGHT: 266 LBS | BODY MASS INDEX: 33.07 KG/M2 | HEIGHT: 75 IN | HEART RATE: 113 BPM | OXYGEN SATURATION: 95 %

## 2023-11-03 DIAGNOSIS — R33.9 URINARY RETENTION: Primary | ICD-10-CM

## 2023-11-03 DIAGNOSIS — N39.0 RECURRENT UTI: ICD-10-CM

## 2023-11-03 DIAGNOSIS — N13.30 HYDRONEPHROSIS, LEFT: ICD-10-CM

## 2023-11-03 RX ORDER — MULTIPLE VITAMINS W/ MINERALS TAB 9MG-400MCG
1 TAB ORAL DAILY
COMMUNITY

## 2023-11-03 NOTE — PROGRESS NOTES
.     Follow Up Office Visit      Patient Name: Darien Camarena  : 1935   MRN: [id number]     Chief Complaint:    Chief Complaint   Patient presents with    Urinary Retention       Referring Provider: No ref. provider found    History of Present Illness: Darien Camarena is a 88 y.o. male who presents today for follow up of left hydronephrosis and recurrent UTI. CT imaging in  reveals chronic severe left-sided hydroureteronephrosis with distended urinary bladder.      He follows with infectious disease, Dr. Og. He was last seen in office by Dr. Larkin on 23, at that time patient was instructed to perform CIC 4 times daily for history of urinary retention and recurrent UTIs. He reports he does not void between catheterization, occasionally a few drops. He has been performing CIC 3 times daily.  Urine Culture 10/30/23; Klebsiella Pneumoniae ESBL. He is currently taking Levaquin. He reports he is feeling well today. He has had no issues with CIC. His daughter is with him today and helps to provide some of the history.   Subjective      Review of System: Review of Systems   Genitourinary:  Negative for dysuria and flank pain.        Urinary retention      I have reviewed the ROS documented by my clinical staff, I have updated appropriately and I agree. BERYL Nino    I have reviewed and the following portions of the patient's history were updated as appropriate: past family history, past medical history, past social history, past surgical history and problem list.    Medications:     Current Outpatient Medications:     albuterol sulfate  (90 Base) MCG/ACT inhaler, Inhale 2 puffs Every 4 (Four) Hours As Needed for Wheezing., Disp: 18 g, Rfl: 5    allopurinol (ZYLOPRIM) 300 MG tablet, Take 1 tablet by mouth Daily., Disp: 90 tablet, Rfl: 1    apixaban (Eliquis) 5 MG tablet tablet, Take 1 tablet by mouth Every 12 (Twelve) Hours., Disp: 180 tablet, Rfl: 3    Ascorbic Acid (VITAMIN C)  500 MG capsule, Take 1 tablet by mouth 4 (four) times a day., Disp: , Rfl:     docusate sodium (COLACE) 100 MG capsule, Take 2 capsules by mouth At Night As Needed., Disp: , Rfl:     donepezil (Aricept) 10 MG tablet, Take 1 tablet by mouth Every Night., Disp: 30 tablet, Rfl: 2    Ferrous Gluconate (IRON) 240 (27 FE) MG tablet, Take 1 tablet by mouth Daily., Disp: , Rfl:     finasteride (PROSCAR) 5 MG tablet, Take 1 tablet by mouth every night at bedtime., Disp: 90 tablet, Rfl: 1    furosemide (Lasix) 20 MG tablet, Take 1 tablet by mouth 2 (Two) Times a Day., Disp: 180 tablet, Rfl: 2    levoFLOXacin (Levaquin) 500 MG tablet, Take 1 tablet by mouth Daily., Disp: 10 tablet, Rfl: 0    metFORMIN (GLUCOPHAGE) 1000 MG tablet, Take 1 tablet by mouth twice daily, Disp: 180 tablet, Rfl: 1    metoprolol tartrate (LOPRESSOR) 100 MG tablet, Take 1 tablet by mouth 2 (Two) Times a Day., Disp: 180 tablet, Rfl: 3    multivitamin with minerals (MULTIVITAMIN MEN 50+ PO), Take 1 tablet by mouth Daily. Centrum Sliver, Disp: , Rfl:     omeprazole (priLOSEC) 20 MG capsule, Take 1 capsule by mouth once daily, Disp: 90 capsule, Rfl: 0    potassium chloride (K-DUR,KLOR-CON) 20 MEQ CR tablet, Take 1 tablet by mouth Daily., Disp: 90 tablet, Rfl: 1    pravastatin (PRAVACHOL) 40 MG tablet, Take 1 tablet by mouth Daily., Disp: 90 tablet, Rfl: 1    Probiotic Product (PROBIOTIC ADVANCED PO), Take 1 tablet by mouth 2 (Two) Times a Day., Disp: , Rfl:     sertraline (ZOLOFT) 50 MG tablet, Take 1 tablet by mouth once daily, Disp: 90 tablet, Rfl: 0    tamsulosin (FLOMAX) 0.4 MG capsule 24 hr capsule, Take 1 capsule by mouth once daily, Disp: 90 capsule, Rfl: 1    Tiotropium Bromide Monohydrate (Spiriva Respimat) 1.25 MCG/ACT aerosol solution inhaler, Inhale 2 puffs Daily., Disp: 12 g, Rfl: 1    tiotropium bromide-olodaterol (STIOLTO RESPIMAT) 2.5-2.5 MCG/ACT aerosol solution inhaler, Inhale 2 puffs Daily. 2 inh once a day, Disp: 1 each, Rfl: 3     "valsartan (DIOVAN) 80 MG tablet, Take 1 tablet by mouth Daily., Disp: 90 tablet, Rfl: 1    Current Facility-Administered Medications:     albuterol sulfate HFA (PROVENTIL HFA;VENTOLIN HFA;PROAIR HFA) inhaler 4 puff, 4 puff, Inhalation, Once, Vikram Eagle MD    Allergies:   Allergies   Allergen Reactions    Xarelto [Rivaroxaban] GI Bleeding     GI bleed    Penicillins Hives     Has tolerated cefepime, ceftriaxone         Objective     Physical Exam:   Vital Signs:   Vitals:    11/03/23 0934   Pulse: 113   SpO2: 95%   Weight: 121 kg (266 lb)   Height: 190.5 cm (75\")   PainSc: 0-No pain     Body mass index is 33.25 kg/m².     Physical Exam  Vitals and nursing note reviewed.   Constitutional:       Appearance: Normal appearance.   HENT:      Head: Normocephalic and atraumatic.      Nose: Nose normal.      Mouth/Throat:      Mouth: Mucous membranes are moist.   Eyes:      Pupils: Pupils are equal, round, and reactive to light.   Pulmonary:      Effort: Pulmonary effort is normal.   Abdominal:      General: Abdomen is flat.      Palpations: Abdomen is soft.   Musculoskeletal:         General: Normal range of motion.      Cervical back: Normal range of motion.   Skin:     General: Skin is warm and dry.      Capillary Refill: Capillary refill takes less than 2 seconds.   Neurological:      General: No focal deficit present.      Mental Status: He is alert.   Psychiatric:         Mood and Affect: Mood normal.       Labs:   Brief Urine Lab Results  (Last result in the past 365 days)        Color   Clarity   Blood   Leuk Est   Nitrite   Protein   CREAT   Urine HCG        10/30/23 1749 Yellow   Slightly Cloudy   50 Meet/ul   500 Germaine/ul   Positive   Trace                   Urine Culture          8/30/2023    11:12 9/21/2023    13:52 10/30/2023    17:39   Urine Culture   Urine Culture 25,000 CFU/mL Normal Urogenital Kareen  >100,000 CFU/mL Klebsiella pneumoniae ESBL  >100,000 CFU/mL Klebsiella pneumoniae ESBL         Lab Results "   Component Value Date    GLUCOSE 101 (H) 10/30/2023    CALCIUM 9.5 10/30/2023     10/30/2023    K 4.0 10/30/2023    CO2 30.0 (H) 10/30/2023     10/30/2023    BUN 21 10/30/2023    CREATININE 1.05 10/30/2023    EGFRIFNONA 79 12/16/2021    BCR 20.0 10/30/2023    ANIONGAP 8.0 10/30/2023       Lab Results   Component Value Date    WBC 6.53 10/30/2023    HGB 15.0 10/30/2023    HCT 45.3 10/30/2023    MCV 94.8 10/30/2023     10/30/2023       Images:   CT Chest Without Contrast Diagnostic    Result Date: 10/2/2023  Impression: 1. No change in left lower lobe pulmonary nodule measuring 14 mm, previously 13 mm. There is surrounding groundglass airspace disease which is unchanged which may be due to treatment change. 2. Interval decrease in size of right paratracheal lymph node. 3. Large amount of coronary artery calcification. 4. 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 Air related to recent instrumentation or Del Cid catheter could also give this appearance. Clinical correlation recommended. Electronically Signed: Jose L Olea MD  10/2/2023 4:00 PM EDT  Workstation ID: [id number]      Measures:   Tobacco:   Darien Camarena  reports that he quit smoking about 63 years ago. His smoking use included cigarettes. He started smoking about 76 years ago. He smoked an average of 1 pack per day. He has been exposed to tobacco smoke. He quit smokeless tobacco use about 12 years ago.  His smokeless tobacco use included chew..         Urine Incontinence: Patient reports that he is not currently experiencing any symptoms of urinary incontinence.   Assessment / Plan      Assessment/Plan:   88 y.o. male who presented today for follow up of urinary retention and recurrent UTIs requiring CIC.     We discussed the importance of increasing to CIC 4 times daily from 3 times daily. He is understanding and agreeable. No issues with CIC, he has plenty of  catheters at his house.     UA with blood and 2+ leuks. We will hold off on repeat culture at this time. He is currently taking Levaquin from positive urine culture 4 days ago.     I have advised patient and patients daughter to follow up with ID regarding recurrent UTIs. They are agreeable. He will follow up in 3 months for symptom check.     Diagnoses and all orders for this visit:    1. Urinary retention [R33.9 (ICD-10-CM)] (Primary)    2. Hydronephrosis, left    3. Recurrent UTI       Follow Up:   Return in about 3 months (around 2/3/2024).    I spent approximately 30 minutes providing clinical care for this patient; including review of patient's chart and provider documentation, face to face time spent with patient in examination room (obtaining history, performing physical exam, discussing diagnosis and management options), placing orders, and completing patient documentation.     BERYL Nino  [facility]

## 2023-11-03 NOTE — TELEPHONE ENCOUNTER
Caller: SHAWN KWON    Relationship: Emergency Contact    Best call back number: [phone]    What is the best time to reach you: ANYTIME     Who are you requesting to speak with (clinical staff, provider,  specific staff member): CLINICAL STAFF      What was the call regarding: THE CALLER STATES THAT THE PATIENT IS SUPPOSED TO HAVE AN MRI THEN BE SEEN AGAIN ON 11/30/2023 HOWEVER HE CAN'T DO THE MRI UNTIL DECEMBER THE CALLER WOULD LIKE TO KNOW IF THEY SHOULD MOVE THE APPOINTMENT ON 11/30/2023 OR IF THE PATIENTS MRI CAN BE MOVED UP

## 2023-11-03 NOTE — TELEPHONE ENCOUNTER
Provider: ANGELITO WARD    Caller: SHAWN KWON    Relationship to Patient: DAUGHTER    Reason for Call: PT DAUGHTER HAS SCHEDULED AN APPT ON 11/6/23 @ 9:30 WITH DR FLOYD.    PLEASE SEND OFFICE NOTES AND LABS TO DR FLOYD OFFICE.      When was the patient last seen: 11/3/23

## 2023-11-06 ENCOUNTER — TRANSCRIBE ORDERS (OUTPATIENT)
Dept: LAB | Facility: HOSPITAL | Age: 88
End: 2023-11-06
Payer: MEDICARE

## 2023-11-06 ENCOUNTER — LAB (OUTPATIENT)
Dept: LAB | Facility: HOSPITAL | Age: 88
End: 2023-11-06
Payer: MEDICARE

## 2023-11-06 DIAGNOSIS — N13.6 HYDRONEPHROSIS WITH INFECTION: ICD-10-CM

## 2023-11-06 DIAGNOSIS — N39.0 URINARY TRACT INFECTION WITHOUT HEMATURIA, SITE UNSPECIFIED: ICD-10-CM

## 2023-11-06 DIAGNOSIS — N10 PYELONEPHRITIS, ACUTE: ICD-10-CM

## 2023-11-06 DIAGNOSIS — M54.9 BACK PAIN, UNSPECIFIED BACK LOCATION, UNSPECIFIED BACK PAIN LATERALITY, UNSPECIFIED CHRONICITY: Primary | ICD-10-CM

## 2023-11-06 DIAGNOSIS — Z16.12 INFECTION DUE TO EXTENDED SPECTRUM BETA-LACTAMASE PRODUCING BACTERIA: ICD-10-CM

## 2023-11-06 DIAGNOSIS — A49.9 INFECTION DUE TO EXTENDED SPECTRUM BETA-LACTAMASE PRODUCING BACTERIA: ICD-10-CM

## 2023-11-06 DIAGNOSIS — B96.1 BACTEREMIA DUE TO KLEBSIELLA PNEUMONIAE: ICD-10-CM

## 2023-11-06 DIAGNOSIS — R78.81 BACTEREMIA DUE TO KLEBSIELLA PNEUMONIAE: ICD-10-CM

## 2023-11-06 DIAGNOSIS — M54.9 BACK PAIN, UNSPECIFIED BACK LOCATION, UNSPECIFIED BACK PAIN LATERALITY, UNSPECIFIED CHRONICITY: ICD-10-CM

## 2023-11-06 LAB
BACTERIA UR QL AUTO: ABNORMAL /HPF
BILIRUB UR QL STRIP: NEGATIVE
CLARITY UR: CLEAR
COLOR UR: YELLOW
GLUCOSE UR STRIP-MCNC: NEGATIVE MG/DL
HGB UR QL STRIP.AUTO: ABNORMAL
HYALINE CASTS UR QL AUTO: ABNORMAL /LPF
KETONES UR QL STRIP: NEGATIVE
LEUKOCYTE ESTERASE UR QL STRIP.AUTO: ABNORMAL
NITRITE UR QL STRIP: NEGATIVE
PH UR STRIP.AUTO: 5.5 [PH] (ref 5–8)
PROT UR QL STRIP: ABNORMAL
RBC # UR STRIP: ABNORMAL /HPF
REF LAB TEST METHOD: ABNORMAL
RENAL EPI CELLS #/AREA URNS HPF: ABNORMAL /HPF
SP GR UR STRIP: 1.01 (ref 1–1.03)
SQUAMOUS #/AREA URNS HPF: ABNORMAL /HPF
UROBILINOGEN UR QL STRIP: ABNORMAL
WBC # UR STRIP: ABNORMAL /HPF

## 2023-11-06 PROCEDURE — 87086 URINE CULTURE/COLONY COUNT: CPT

## 2023-11-06 PROCEDURE — 81001 URINALYSIS AUTO W/SCOPE: CPT

## 2023-11-06 NOTE — TELEPHONE ENCOUNTER
Name: SHAWN KWON    Relationship: Emergency Contact    Best Callback Number: [phone]    HUB PROVIDED THE RELAY MESSAGE FROM THE OFFICE   PATIENT VOICED UNDERSTANDING AND HAS NO FURTHER QUESTIONS AT THIS TIME    ADDITIONAL INFORMATION: THE CALLER STATES THEY WILL KEEP APPOINTMENT FOR 11/30/2023 SHE WOULD LIKE TO KNOW IF THE DOCTOR CAN HELP HER MOVE UP THE MRI APPOINTMENT

## 2023-11-06 NOTE — TELEPHONE ENCOUNTER
"Left message voice mail for Nikki that we were returning her call and to please call back.  Ofc. # given.     HUB:  Please relay that Dr Way said \"Keep appts as scheduled.\"    Document if notified.        "

## 2023-11-07 ENCOUNTER — OFFICE VISIT (OUTPATIENT)
Dept: PULMONOLOGY | Facility: CLINIC | Age: 88
End: 2023-11-07
Payer: MEDICARE

## 2023-11-07 ENCOUNTER — TELEPHONE (OUTPATIENT)
Dept: INTERNAL MEDICINE | Facility: CLINIC | Age: 88
End: 2023-11-07
Payer: MEDICARE

## 2023-11-07 VITALS
TEMPERATURE: 97.5 F | BODY MASS INDEX: 33.32 KG/M2 | OXYGEN SATURATION: 93 % | DIASTOLIC BLOOD PRESSURE: 67 MMHG | HEART RATE: 113 BPM | SYSTOLIC BLOOD PRESSURE: 130 MMHG | WEIGHT: 268 LBS | HEIGHT: 75 IN

## 2023-11-07 DIAGNOSIS — J42 CHRONIC BRONCHITIS, UNSPECIFIED CHRONIC BRONCHITIS TYPE: ICD-10-CM

## 2023-11-07 DIAGNOSIS — C34.90 PRIMARY MALIGNANT NEOPLASM OF LUNG METASTATIC TO OTHER SITE, UNSPECIFIED LATERALITY: Primary | ICD-10-CM

## 2023-11-07 DIAGNOSIS — R26.81 GAIT INSTABILITY: Primary | ICD-10-CM

## 2023-11-07 LAB — BACTERIA SPEC AEROBE CULT: NO GROWTH

## 2023-11-07 RX ORDER — ALBUTEROL SULFATE 90 UG/1
2 AEROSOL, METERED RESPIRATORY (INHALATION) EVERY 4 HOURS PRN
Qty: 18 G | Refills: 5 | Status: SHIPPED | OUTPATIENT
Start: 2023-11-07

## 2023-11-07 NOTE — TELEPHONE ENCOUNTER
I was able move MRI appointment to 11/13/2023 and have given Nikki all the new appointment information

## 2023-11-07 NOTE — TELEPHONE ENCOUNTER
We will know more after his MRI.  In the meantime, he can take tylenol 1000 mg (2 extra strength tylenol) 3 times daily.  If that doesn't control the pain, let me know.  If there is increased swelling, he needs to be seen.  Pito Way MD  17:17 EST  11/07/23

## 2023-11-07 NOTE — TELEPHONE ENCOUNTER
Caller: SHAWN KWON    Relationship to patient: Emergency Contact    Best call back number: [phone]     PATIENT;S DAUGHTER STATES THAT HE IS HAVING CALF PAIN IN THAT IT IS HARD FOR HIM TO WALK AND WANTED TO KNOW WHAT DR KARIMI SUGGESTS. PLEASE ADVISE.

## 2023-11-07 NOTE — TELEPHONE ENCOUNTER
"Spoke with patient daughter, Nikki, informed that Dr Way said   \"We will know more after his MRI.  In the meantime, he can take tylenol 1000 mg (2 extra strength tylenol) 3 times daily.  If that doesn't control the pain, let me know.  If there is increased swelling, he needs to be seen.\"  Verbalized understanding and agreement.   "

## 2023-11-07 NOTE — PROGRESS NOTES
PULMONARY FOLLOW-UP OUTPATIENT NOTE:     Patient ID/Chief Complaint: .Darien Camarena is a 88 y.o. male presenting for follow up on lung nodule, COPD.     History of Present Illness: Patient Isidro is an 87 year old male with past medical history of hypertension, CAD, atrial fibrillation, HLD and STEVEN.  He was first evaluated in pulmonary clinic on 1/10/2023 following a recent hospitalization for acute hypoxic respiratory failure secondary to influenza A in December 2022.  Thoracic imaging during hospitalization revealed an incidental left lower lobe pulmonary nodule. This was followed up with a PET scan (1/04/2023) demonstrating avidity.  After much deliberation on work-up and treatment of left lower lobe nodule (ie bronchoscopy, serial imaging, empiric radiation)-- the decision was made to pursue SBRT which was completed in Feburary 2023.     Patient continues to follow with pulmonary and radiation oncology for surveillance.    He is dyspneic at baseline with exertion.  Pulmonary function testing at outpatient follow-up with severe obstructive lung disease.  Patient started on an inhaler regimen which has overall improved quality of life and exercise tolerance.  He denies chest pain, cough, fever, chills, lymphadenopathy, night sweats, weight loss, nausea, vomiting, numbness/tingling in arms/neck, syncope and presyncope.    PFSH: Reviewed in EMR, Significant Changes Noted Above    Review of Systems: Fourteen point review of systems performed and negative except for those issues listed in HPI    Medications:  Current Outpatient Medications   Medication Sig Dispense Refill    albuterol sulfate  (90 Base) MCG/ACT inhaler Inhale 2 puffs Every 4 (Four) Hours As Needed for Wheezing. 18 g 5    allopurinol (ZYLOPRIM) 300 MG tablet Take 1 tablet by mouth Daily. 90 tablet 1    apixaban (Eliquis) 5 MG tablet tablet Take 1 tablet by mouth Every 12 (Twelve) Hours. 180 tablet 3    Ascorbic Acid (VITAMIN C) 500 MG  capsule Take 1 tablet by mouth 4 (four) times a day.      docusate sodium (COLACE) 100 MG capsule Take 2 capsules by mouth At Night As Needed.      donepezil (Aricept) 10 MG tablet Take 1 tablet by mouth Every Night. 30 tablet 2    Ferrous Gluconate (IRON) 240 (27 FE) MG tablet Take 1 tablet by mouth Daily.      finasteride (PROSCAR) 5 MG tablet Take 1 tablet by mouth every night at bedtime. 90 tablet 1    furosemide (Lasix) 20 MG tablet Take 1 tablet by mouth 2 (Two) Times a Day. 180 tablet 2    levoFLOXacin (Levaquin) 500 MG tablet Take 1 tablet by mouth Daily. 10 tablet 0    metFORMIN (GLUCOPHAGE) 1000 MG tablet Take 1 tablet by mouth twice daily 180 tablet 1    metoprolol tartrate (LOPRESSOR) 100 MG tablet Take 1 tablet by mouth 2 (Two) Times a Day. 180 tablet 3    multivitamin with minerals (MULTIVITAMIN MEN 50+ PO) Take 1 tablet by mouth Daily. Centrum Sliver      omeprazole (priLOSEC) 20 MG capsule Take 1 capsule by mouth once daily 90 capsule 0    potassium chloride (K-DUR,KLOR-CON) 20 MEQ CR tablet Take 1 tablet by mouth Daily. 90 tablet 1    pravastatin (PRAVACHOL) 40 MG tablet Take 1 tablet by mouth Daily. 90 tablet 1    Probiotic Product (PROBIOTIC ADVANCED PO) Take 1 tablet by mouth 2 (Two) Times a Day.      sertraline (ZOLOFT) 50 MG tablet Take 1 tablet by mouth once daily 90 tablet 0    tamsulosin (FLOMAX) 0.4 MG capsule 24 hr capsule Take 1 capsule by mouth once daily 90 capsule 1    Tiotropium Bromide Monohydrate (Spiriva Respimat) 1.25 MCG/ACT aerosol solution inhaler Inhale 2 puffs Daily. 12 g 1    tiotropium bromide-olodaterol (STIOLTO RESPIMAT) 2.5-2.5 MCG/ACT aerosol solution inhaler Inhale 2 puffs Daily. 2 inh once a day 1 each 3    valsartan (DIOVAN) 80 MG tablet Take 1 tablet by mouth Daily. 90 tablet 1     Current Facility-Administered Medications   Medication Dose Route Frequency Provider Last Rate Last Admin    albuterol sulfate HFA (PROVENTIL HFA;VENTOLIN HFA;PROAIR HFA) inhaler 4 puff   4 puff Inhalation Once Vikram Eagle MD         Immunizations:  Chart reviewed: immunizations are up to date and documented.  Immunization History   Administered Date(s) Administered    31-influenza Vac Quardvalent Preservativ 11/01/2018, 10/16/2019    COVID-19 (PFIZER) BIVALENT 12+YRS 12/22/2022    COVID-19 (PFIZER) Purple Cap Monovalent 01/26/2021, 02/19/2021    Fluzone High Dose =>65 Years ([facility] ONLY) 10/01/2016, 09/18/2017, 09/15/2018, 10/12/2019, 09/18/2020, 09/25/2021    Fluzone High-Dose 65+yrs 09/19/2020, 09/25/2021, 12/22/2022, 10/30/2023    Hepatitis A 09/15/2018, 11/01/2018    Pneumococcal Conjugate 13-Valent (PCV13) 10/26/2015    Pneumococcal Polysaccharide (PPSV23) 06/24/2006, 09/18/2020    Pneumococcal, Unspecified 11/30/2006    Shingrix 09/25/2021, 04/15/2023    Td (TDVAX) 06/24/2005    Tdap 06/14/2018     Physical Examination:  Vitals:    11/07/23 0833   BP: 130/67   Pulse: 113   Temp: 97.5 °F (36.4 °C)   SpO2: 93%     General: The patient appears in no acute distress.  Alert, cooperative and interactive.   HEENT: NC/AT, PERRL, Normal nasal mucosa.  Chest: Clear to auscultation bilaterally, No wheezing, rhonchi, or rales. Normal work of breathing. Equal chest rise. On room air with appropriate oxygen saturations.   Cardiac: Regular rhythm, normal rate, S1S2 auscultated. No murmurs, rubs or gallops.   Extremities: Nonpitting lower extremity edema. No clubbing or cyanosis.  Neuro: Motor power grossly intact bilaterally. Speech fluid and fluent.  Thought process coherent.  Psych: Alert and oriented x3. Mood stable.    Review of Data:  - Laboratory Studies: I personally reviewed recent lab work in EMR  - Radiology Results: I have personally reviewed and interpreted the images in EMR    PET (1/04/2023):  Radiology Impression:   The subsolid left lower lobe nodule is mildly hypermetabolic, highly concerning for primary malignancy. Tissue sampling is recommended. No enlarged or hypermetabolic hilar or  mediastinal lymph nodes to suggest prabhu metastases at this time. No evidence   of distant metastatic disease.    CT chest w/o contrast (6/05/2023):  Radiology Impression  1.Mildly decreased size of solid left lower lobe nodule, previously subsolid.  2.New linear opacities in the left lower lobe and lingula, consistent with post-treatment changes.    CT chest w/o contrast (10/02/2023):   Radiology Impression:   1. No change in left lower lobe pulmonary nodule measuring 14 mm, previously 13 mm. There is surrounding groundglass airspace disease which is unchanged which may be due to treatment change.  2. Interval decrease in size of right paratracheal lymph node.  3. Large amount of coronary artery calcification.  4. 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 Air related to recent instrumentation or Del Cid catheter could also give this appearance. Clinical correlation recommended.    PFT (1/24/2023): Personally reviewed/interpreted study.  There is severe airflow obstruction (FEV1 43% predicted).  There is a significant response in FVC to a one-time dose of beta agonist bronchodilators (14%). There is concomitant severe restriction.  The diffusing capacity, uncorrected for hemoglobin, is mildly reduced.  PFT (11/07/2023): Personally reviewed and interpreted.  On the studies, no obstruction based on gold criteria.  However, given past results and significantly decreased FEV1 and FVC-- certainly believe that lower limit of normal values would demonstrate moderate to severe obstructive lung disease.  Decreased FVC and FEV1 suggestive of restrictive defect.  Correlated with prior studies and discussed with patient and daughter.     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  Personally reviewed  recent CTA chest, PET scan and follow-up CT chest.  Compared with prior CTA from June 2022 where opacities in the area of question make it difficult to appreciate presence of nodule. Nevertheless, CTA from 12/12/2022 shows a subsolid nodule in patient's left lower lobe with an increasing solid component.  Follow-up PET scan on 1/04/2023 with avidity.  Radiology concern for primary malignancy, likely primary bronchogenic carcinoma. Now s/p empiric SBRT (completed 2/24/2023).  Follow-up imaging performed on 10/02/2023 (see results above).  Radiation oncology follow-up on 10/04/2023.  Per documentation, it appears that repeat scan will be performed at a 4x month interval    -Refilled Stiolto (LABA/Anticholinergic) and as needed albuterol.  Spoke with patient today concerning inhaler alternatives as Stiolto has been difficult to cover (> $100 per month with insurance)    - Watchman procedure scheduled later this month (11/28/2023)    - LE edema has improved since restarting lasix    - F/U in 6 months (after radiation oncology follow-up and repeat imaging); Earlier if needed    - Patient's daughter requested sleep medicine evaluation.  Patient has a CPAP and sleep study was performed greater than 20 years ago    -- Jose Eagle MD   Pulmonary/Critical Care    Level of service justified based on 30 minutes spent in patient care on this date of service including, but not limited to: preparing to see the patient, obtaining and/or reviewing history, performing medically appropriate examination, ordering tests/medicine/procedures, independently interpreting results, documenting clinical information in EHR, and counseling/education of patient/family/caregiver (excluding time spent on other separate services such as performing procedures or test interpretation, if applicable). (Level 4 30-39 minutes; Level 5 40-54 minutes)

## 2023-11-07 NOTE — TELEPHONE ENCOUNTER
Caller: SHAWN KWON    Relationship: Emergency Contact    Best call back number: [phone]     What orders are you requesting (i.e. lab or imaging): A TALL WALKER    In what timeframe would the patient need to come in: AS SOON AS POSSIBLE    Where will you receive your lab/imaging services: [facility]    Additional notes:

## 2023-11-08 DIAGNOSIS — G47.33 OBSTRUCTIVE SLEEP APNEA: Primary | ICD-10-CM

## 2023-11-08 NOTE — TELEPHONE ENCOUNTER
Spoke with patient daughter, Nikki, informed that order is ready, requests order be mailed to her and she will take order with her to go  walker.     Order placed in outgoing mail.

## 2023-11-09 DIAGNOSIS — E78.5 HYPERLIPIDEMIA LDL GOAL <100: ICD-10-CM

## 2023-11-09 DIAGNOSIS — F32.9 REACTIVE DEPRESSION: ICD-10-CM

## 2023-11-10 RX ORDER — FINASTERIDE 5 MG/1
5 TABLET, FILM COATED ORAL
Qty: 90 TABLET | Refills: 0 | Status: SHIPPED | OUTPATIENT
Start: 2023-11-10

## 2023-11-10 RX ORDER — PRAVASTATIN SODIUM 40 MG
40 TABLET ORAL DAILY
Qty: 90 TABLET | Refills: 0 | Status: SHIPPED | OUTPATIENT
Start: 2023-11-10

## 2023-11-10 RX ORDER — ALLOPURINOL 300 MG/1
300 TABLET ORAL DAILY
Qty: 90 TABLET | Refills: 0 | Status: SHIPPED | OUTPATIENT
Start: 2023-11-10

## 2023-11-13 ENCOUNTER — HOSPITAL ENCOUNTER (OUTPATIENT)
Dept: MRI IMAGING | Facility: HOSPITAL | Age: 88
Discharge: HOME | End: 2023-11-13
Admitting: INTERNAL MEDICINE
Payer: MEDICARE

## 2023-11-13 DIAGNOSIS — R41.3 MEMORY LOSS: ICD-10-CM

## 2023-11-13 PROCEDURE — A9577 INJ MULTIHANCE: HCPCS | Performed by: INTERNAL MEDICINE

## 2023-11-13 PROCEDURE — 70553 MRI BRAIN STEM W/O & W/DYE: CPT

## 2023-11-13 PROCEDURE — 0 GADOBENATE DIMEGLUMINE 529 MG/ML SOLUTION: Performed by: INTERNAL MEDICINE

## 2023-11-13 RX ADMIN — GADOBENATE DIMEGLUMINE 20 ML: 529 INJECTION, SOLUTION INTRAVENOUS at 20:25

## 2023-11-15 ENCOUNTER — DOCUMENTATION (OUTPATIENT)
Dept: CARDIOLOGY | Facility: HOSPITAL | Age: 88
End: 2023-11-15
Payer: MEDICARE

## 2023-11-15 DIAGNOSIS — I48.0 PAROXYSMAL ATRIAL FIBRILLATION: Primary | Chronic | ICD-10-CM

## 2023-11-15 RX ORDER — ASPIRIN 81 MG/1
81 TABLET ORAL DAILY
Qty: 90 TABLET | Refills: 1 | Status: SHIPPED | OUTPATIENT
Start: 2023-11-28

## 2023-11-15 NOTE — PROGRESS NOTES
PRE-WATCHMAN HISTORY  Darien Camarena 1935   [address] [place] [address]   [phone] (home)     Referring MD:  Pito Way MD   Information obtained from: [x] Medical record review  [x] Patient report  Scheduled for: Watchman implant on 11/28/23 with Dr. Jonas JETER Visit: Pito Way MD     History & Risk Factors (prior to Watchman implant)  [id number] Risk Factors:  Congestive HF     [x] No   [] Yes  LV Dysfunction   [x] No   [] Yes  Hypertension      [] No   [x] Yes  Diabetes    [] No   [x] Yes  Stroke       [x] No   [] Yes  TIA       [x] No   [] Yes  Thromboembolism    [x] No   [] Yes  Vascular Disease   [] No   [x] Yes-CAD      HAS-BLED Risk Factors:  HTN (uncontrolled) [x] No  [] Yes  Abnormal Renal Fxn  [] No  [x] Yes-CKD  Abnormal Liver Fxn   [x] No  [] Yes  Stroke            [x] No  [] Yes  Bleeding event [] No  [x] Yes  Labile INR      [x] No  [] Yes  Alcohol  [x] No  [] Yes  Antiplatelets      [x] No  [] Yes  NSAIDS  [x] No  [] Yes    Additional Stroke & Bleeding Risk Factors:  Increased Fall Risk   [x] No  [] Yes  Bleeding Event         [] No  [x] Yes      If Yes, Type      [] Intracranial [] Epistaxis   [x] GI   [x] Other-hematuria    Rhythm History:  AFIBTYPE: paroxysmal    Valvular AFib        [x] No  [] Yes  Attempt at AFib Termination:  [] No  [x] Yes       If Yes, pharmacologic CV    [x] No  [] Yes       If Yes, DC cardioversion  [] No  [x] Yes       If Yes, catheter ablation  [x] No  [] Yes         If Yes, surgical ablation  [x] No  [] Yes  Atrial Flutter    [x] No  [] Yes  SIVAKUMAR Intervention    [x] No  [] Yes    Additional History & Risk Factors:  Cardiomyopathy   [x] No  [] Yes  Chronic Lung Disease  [] No  [x] Yes-COPD  Coronary Artery Disease  [] No  [x] Yes  Sleep Apnea    [] No  [x] Yes       If Yes, compliant with treatment [] No  [x] Yes  Epicardial Approach Considered [x] No  [] Yes    Diagnostic Studies:  Last Echo:  [x] TTE Date: 8/8/2022                   EF: 50%             LA: 4.5cm             VHD: Cardiac valves are anatomically and functionally normal.          Pre-procedure blood thinner medications:  Hold 1 dose of Eliquis prior to procedure. Start ASA 81mg         11/15/23 11:26 EST   LMPREWATCHMAN Revised 1.31.2017

## 2023-11-17 ENCOUNTER — PRE-ADMISSION TESTING (OUTPATIENT)
Dept: PREADMISSION TESTING | Facility: HOSPITAL | Age: 88
End: 2023-11-17
Payer: MEDICARE

## 2023-11-17 ENCOUNTER — HOSPITAL ENCOUNTER (OUTPATIENT)
Dept: CT IMAGING | Facility: HOSPITAL | Age: 88
Discharge: HOME | End: 2023-11-17
Payer: MEDICARE

## 2023-11-17 DIAGNOSIS — Z87.19 H/O: GI BLEED: ICD-10-CM

## 2023-11-17 DIAGNOSIS — I48.0 PAROXYSMAL ATRIAL FIBRILLATION: Chronic | ICD-10-CM

## 2023-11-17 LAB
ANION GAP SERPL CALCULATED.3IONS-SCNC: 9 MMOL/L (ref 5–15)
BUN SERPL-MCNC: 27 MG/DL (ref 8–23)
BUN/CREAT SERPL: 26 (ref 7–25)
CALCIUM SPEC-SCNC: 9.2 MG/DL (ref 8.6–10.5)
CHLORIDE SERPL-SCNC: 103 MMOL/L (ref 98–107)
CO2 SERPL-SCNC: 31 MMOL/L (ref 22–29)
CREAT SERPL-MCNC: 1.04 MG/DL (ref 0.76–1.27)
DEPRECATED RDW RBC AUTO: 55.8 FL (ref 37–54)
EGFRCR SERPLBLD CKD-EPI 2021: 69.5 ML/MIN/1.73
ERYTHROCYTE [DISTWIDTH] IN BLOOD BY AUTOMATED COUNT: 14.9 % (ref 12.3–15.4)
GLUCOSE SERPL-MCNC: 104 MG/DL (ref 65–99)
HCT VFR BLD AUTO: 44.7 % (ref 37.5–51)
HGB BLD-MCNC: 13.9 G/DL (ref 13–17.7)
MCH RBC QN AUTO: 31.3 PG (ref 26.6–33)
MCHC RBC AUTO-ENTMCNC: 31.1 G/DL (ref 31.5–35.7)
MCV RBC AUTO: 100.7 FL (ref 79–97)
PLATELET # BLD AUTO: 179 10*3/MM3 (ref 140–450)
PMV BLD AUTO: 10.1 FL (ref 6–12)
POTASSIUM SERPL-SCNC: 4.3 MMOL/L (ref 3.5–5.2)
RBC # BLD AUTO: 4.44 10*6/MM3 (ref 4.14–5.8)
SODIUM SERPL-SCNC: 143 MMOL/L (ref 136–145)
WBC NRBC COR # BLD AUTO: 6.3 10*3/MM3 (ref 3.4–10.8)

## 2023-11-17 PROCEDURE — 25510000001 IOPAMIDOL PER 1 ML: Performed by: INTERNAL MEDICINE

## 2023-11-17 PROCEDURE — 71275 CT ANGIOGRAPHY CHEST: CPT

## 2023-11-17 PROCEDURE — 36415 COLL VENOUS BLD VENIPUNCTURE: CPT

## 2023-11-17 PROCEDURE — 85027 COMPLETE CBC AUTOMATED: CPT

## 2023-11-17 PROCEDURE — 80048 BASIC METABOLIC PNL TOTAL CA: CPT

## 2023-11-17 RX ADMIN — IOPAMIDOL 80 ML: 755 INJECTION, SOLUTION INTRAVENOUS at 12:27

## 2023-11-21 ENCOUNTER — TELEPHONE (OUTPATIENT)
Dept: INTERNAL MEDICINE | Facility: CLINIC | Age: 88
End: 2023-11-21
Payer: MEDICARE

## 2023-11-21 NOTE — TELEPHONE ENCOUNTER
----- Message from Pito Way MD sent at 11/21/2023  9:15 AM EST -----  Please let her know that he should try OTC CoEnzyme Q10 300 mg po daily.  Pito Way MD  09:13 EST  11/21/23

## 2023-11-21 NOTE — TELEPHONE ENCOUNTER
Tried to reach patient no answer left voicemail to return call    RELAY:  Please let her know that he should try OTC CoEnzyme Q10 300 mg po daily.

## 2023-11-21 NOTE — TELEPHONE ENCOUNTER
Name: SHAWN KWON    Relationship: Emergency Contact    Best Callback Number: [phone]    HUB PROVIDED THE RELAY MESSAGE FROM THE OFFICE   PATIENT VOICED UNDERSTANDING AND HAS NO FURTHER QUESTIONS AT THIS TIME    ADDITIONAL INFORMATION: DAUGHTER WAS DRIVING AND COULD NOT WRITE DOWN THE MEDICATION INFORMATION. SHE IS ASKING THIS TO BE ADDED TO MY CHART

## 2023-11-28 ENCOUNTER — HOSPITAL ENCOUNTER (INPATIENT)
Facility: HOSPITAL | Age: 88
LOS: 1 days | Discharge: HOME | End: 2023-11-29
Attending: INTERNAL MEDICINE | Admitting: INTERNAL MEDICINE
Payer: MEDICARE

## 2023-11-28 ENCOUNTER — ANESTHESIA (OUTPATIENT)
Dept: CARDIOLOGY | Facility: HOSPITAL | Age: 88
End: 2023-11-28
Payer: MEDICARE

## 2023-11-28 ENCOUNTER — APPOINTMENT (OUTPATIENT)
Dept: CARDIOLOGY | Facility: HOSPITAL | Age: 88
End: 2023-11-28
Payer: MEDICARE

## 2023-11-28 ENCOUNTER — ANESTHESIA EVENT (OUTPATIENT)
Dept: CARDIOLOGY | Facility: HOSPITAL | Age: 88
End: 2023-11-28
Payer: MEDICARE

## 2023-11-28 DIAGNOSIS — I48.0 PAROXYSMAL ATRIAL FIBRILLATION: ICD-10-CM

## 2023-11-28 DIAGNOSIS — Z87.19 H/O: GI BLEED: ICD-10-CM

## 2023-11-28 DIAGNOSIS — I48.20 ATRIAL FIBRILLATION, CHRONIC: ICD-10-CM

## 2023-11-28 DIAGNOSIS — Z95.818 PRESENCE OF WATCHMAN LEFT ATRIAL APPENDAGE CLOSURE DEVICE: Primary | ICD-10-CM

## 2023-11-28 DIAGNOSIS — I48.0 PAROXYSMAL A-FIB: ICD-10-CM

## 2023-11-28 LAB
ACT BLD: 347 SECONDS (ref 82–152)
LV EF 2D ECHO EST: 50 %

## 2023-11-28 PROCEDURE — 25010000002 SUGAMMADEX 500 MG/5ML SOLUTION: Performed by: NURSE ANESTHETIST, CERTIFIED REGISTERED

## 2023-11-28 PROCEDURE — 25010000002 PROTAMINE SULFATE PER 10 MG: Performed by: INTERNAL MEDICINE

## 2023-11-28 PROCEDURE — 85347 COAGULATION TIME ACTIVATED: CPT

## 2023-11-28 PROCEDURE — C1893 INTRO/SHEATH, FIXED,NON-PEEL: HCPCS | Performed by: INTERNAL MEDICINE

## 2023-11-28 PROCEDURE — C1894 INTRO/SHEATH, NON-LASER: HCPCS | Performed by: INTERNAL MEDICINE

## 2023-11-28 PROCEDURE — C1769 GUIDE WIRE: HCPCS | Performed by: INTERNAL MEDICINE

## 2023-11-28 PROCEDURE — 33340 PERQ CLSR TCAT L ATR APNDGE: CPT | Performed by: INTERNAL MEDICINE

## 2023-11-28 PROCEDURE — 25810000003 SODIUM CHLORIDE 0.9 % SOLUTION: Performed by: NURSE ANESTHETIST, CERTIFIED REGISTERED

## 2023-11-28 PROCEDURE — C1889 IMPLANT/INSERT DEVICE, NOC: HCPCS | Performed by: INTERNAL MEDICINE

## 2023-11-28 PROCEDURE — 25010000002 PROPOFOL 10 MG/ML EMULSION: Performed by: NURSE ANESTHETIST, CERTIFIED REGISTERED

## 2023-11-28 PROCEDURE — 02L73DK OCCLUSION OF LEFT ATRIAL APPENDAGE WITH INTRALUMINAL DEVICE, PERCUTANEOUS APPROACH: ICD-10-PCS | Performed by: INTERNAL MEDICINE

## 2023-11-28 PROCEDURE — C1759 CATH, INTRA ECHOCARDIOGRAPHY: HCPCS | Performed by: INTERNAL MEDICINE

## 2023-11-28 PROCEDURE — S0260 H&P FOR SURGERY: HCPCS

## 2023-11-28 PROCEDURE — 25510000001 IOPAMIDOL PER 1 ML: Performed by: INTERNAL MEDICINE

## 2023-11-28 PROCEDURE — 93355 ECHO TRANSESOPHAGEAL (TEE): CPT

## 2023-11-28 PROCEDURE — 93662 INTRACARDIAC ECG (ICE): CPT | Performed by: INTERNAL MEDICINE

## 2023-11-28 PROCEDURE — 25010000002 BUPIVACAINE 0.5 % SOLUTION: Performed by: INTERNAL MEDICINE

## 2023-11-28 PROCEDURE — C1760 CLOSURE DEV, VASC: HCPCS | Performed by: INTERNAL MEDICINE

## 2023-11-28 PROCEDURE — 93355 ECHO TRANSESOPHAGEAL (TEE): CPT | Performed by: INTERNAL MEDICINE

## 2023-11-28 PROCEDURE — 25010000002 HEPARIN (PORCINE) PER 1000 UNITS: Performed by: INTERNAL MEDICINE

## 2023-11-28 PROCEDURE — 25010000002 CEFAZOLIN PER 500 MG: Performed by: PHYSICIAN ASSISTANT

## 2023-11-28 DEVICE — CAP WATCHMAN FLX PROC: Type: IMPLANTABLE DEVICE | Status: FUNCTIONAL

## 2023-11-28 DEVICE — CAP SYS WATCHMAN TRUSEAL ACC PROC: Type: IMPLANTABLE DEVICE | Status: FUNCTIONAL

## 2023-11-28 DEVICE — LEFT ATRIAL APPENDAGE CLOSURE DEVICE WITH DELIVERY SYSTEM
Type: IMPLANTABLE DEVICE | Status: FUNCTIONAL
Brand: WATCHMAN FLX™

## 2023-11-28 RX ORDER — OMEPRAZOLE 20 MG/1
20 CAPSULE, DELAYED RELEASE ORAL DAILY
Qty: 90 CAPSULE | Refills: 1 | Status: SHIPPED | OUTPATIENT
Start: 2023-11-28

## 2023-11-28 RX ORDER — SODIUM CHLORIDE 9 MG/ML
40 INJECTION, SOLUTION INTRAVENOUS AS NEEDED
Status: DISCONTINUED | OUTPATIENT
Start: 2023-11-28 | End: 2023-11-28 | Stop reason: HOSPADM

## 2023-11-28 RX ORDER — DONEPEZIL HYDROCHLORIDE 10 MG/1
10 TABLET, FILM COATED ORAL NIGHTLY
Status: DISCONTINUED | OUTPATIENT
Start: 2023-11-28 | End: 2023-11-29 | Stop reason: HOSPADM

## 2023-11-28 RX ORDER — LIDOCAINE HYDROCHLORIDE 10 MG/ML
INJECTION, SOLUTION EPIDURAL; INFILTRATION; INTRACAUDAL; PERINEURAL AS NEEDED
Status: DISCONTINUED | OUTPATIENT
Start: 2023-11-28 | End: 2023-11-28 | Stop reason: SURG

## 2023-11-28 RX ORDER — PROPOFOL 10 MG/ML
VIAL (ML) INTRAVENOUS AS NEEDED
Status: DISCONTINUED | OUTPATIENT
Start: 2023-11-28 | End: 2023-11-28 | Stop reason: SURG

## 2023-11-28 RX ORDER — METHENAMINE HIPPURATE 1000 MG/1
1 TABLET ORAL 2 TIMES DAILY WITH MEALS
COMMUNITY

## 2023-11-28 RX ORDER — BUPIVACAINE HYDROCHLORIDE 5 MG/ML
INJECTION, SOLUTION PERINEURAL
Status: DISCONTINUED | OUTPATIENT
Start: 2023-11-28 | End: 2023-11-28 | Stop reason: HOSPADM

## 2023-11-28 RX ORDER — METOPROLOL TARTRATE 100 MG/1
100 TABLET ORAL 2 TIMES DAILY
Status: DISCONTINUED | OUTPATIENT
Start: 2023-11-28 | End: 2023-11-29 | Stop reason: HOSPADM

## 2023-11-28 RX ORDER — SODIUM CHLORIDE 0.9 % (FLUSH) 0.9 %
3 SYRINGE (ML) INJECTION EVERY 12 HOURS SCHEDULED
Status: DISCONTINUED | OUTPATIENT
Start: 2023-11-28 | End: 2023-11-28 | Stop reason: HOSPADM

## 2023-11-28 RX ORDER — ASPIRIN 81 MG/1
81 TABLET ORAL DAILY
Status: DISCONTINUED | OUTPATIENT
Start: 2023-11-28 | End: 2023-11-29 | Stop reason: HOSPADM

## 2023-11-28 RX ORDER — TAMSULOSIN HYDROCHLORIDE 0.4 MG/1
0.4 CAPSULE ORAL DAILY
Status: DISCONTINUED | OUTPATIENT
Start: 2023-11-29 | End: 2023-11-29 | Stop reason: HOSPADM

## 2023-11-28 RX ORDER — SODIUM CHLORIDE 0.9 % (FLUSH) 0.9 %
10 SYRINGE (ML) INJECTION AS NEEDED
Status: DISCONTINUED | OUTPATIENT
Start: 2023-11-28 | End: 2023-11-28 | Stop reason: HOSPADM

## 2023-11-28 RX ORDER — SODIUM CHLORIDE 9 MG/ML
INJECTION, SOLUTION INTRAVENOUS CONTINUOUS PRN
Status: DISCONTINUED | OUTPATIENT
Start: 2023-11-28 | End: 2023-11-28 | Stop reason: SURG

## 2023-11-28 RX ORDER — FINASTERIDE 5 MG/1
5 TABLET, FILM COATED ORAL DAILY
Status: DISCONTINUED | OUTPATIENT
Start: 2023-11-28 | End: 2023-11-29 | Stop reason: HOSPADM

## 2023-11-28 RX ORDER — BUPIVACAINE HCL/0.9 % NACL/PF 0.125 %
PLASTIC BAG, INJECTION (ML) EPIDURAL AS NEEDED
Status: DISCONTINUED | OUTPATIENT
Start: 2023-11-28 | End: 2023-11-28 | Stop reason: SURG

## 2023-11-28 RX ORDER — LIDOCAINE HYDROCHLORIDE 10 MG/ML
INJECTION, SOLUTION EPIDURAL; INFILTRATION; INTRACAUDAL; PERINEURAL
Status: DISCONTINUED | OUTPATIENT
Start: 2023-11-28 | End: 2023-11-28 | Stop reason: HOSPADM

## 2023-11-28 RX ORDER — ONDANSETRON 2 MG/ML
4 INJECTION INTRAMUSCULAR; INTRAVENOUS ONCE AS NEEDED
Status: DISCONTINUED | OUTPATIENT
Start: 2023-11-28 | End: 2023-11-28 | Stop reason: HOSPADM

## 2023-11-28 RX ORDER — ROCURONIUM BROMIDE 10 MG/ML
INJECTION, SOLUTION INTRAVENOUS AS NEEDED
Status: DISCONTINUED | OUTPATIENT
Start: 2023-11-28 | End: 2023-11-28 | Stop reason: SURG

## 2023-11-28 RX ORDER — ASPIRIN 81 MG/1
81 TABLET ORAL DAILY
Qty: 90 TABLET | Refills: 3 | Status: SHIPPED | OUTPATIENT
Start: 2023-11-28 | End: 2023-11-30 | Stop reason: SDUPTHER

## 2023-11-28 RX ORDER — PROTAMINE SULFATE 10 MG/ML
INJECTION, SOLUTION INTRAVENOUS
Status: DISCONTINUED | OUTPATIENT
Start: 2023-11-28 | End: 2023-11-28 | Stop reason: HOSPADM

## 2023-11-28 RX ORDER — ONDANSETRON 2 MG/ML
4 INJECTION INTRAMUSCULAR; INTRAVENOUS EVERY 6 HOURS PRN
Status: DISCONTINUED | OUTPATIENT
Start: 2023-11-28 | End: 2023-11-28 | Stop reason: HOSPADM

## 2023-11-28 RX ORDER — VALSARTAN 80 MG/1
80 TABLET ORAL DAILY
Status: DISCONTINUED | OUTPATIENT
Start: 2023-11-29 | End: 2023-11-29 | Stop reason: HOSPADM

## 2023-11-28 RX ORDER — HEPARIN SODIUM 1000 [USP'U]/ML
INJECTION, SOLUTION INTRAVENOUS; SUBCUTANEOUS
Status: DISCONTINUED | OUTPATIENT
Start: 2023-11-28 | End: 2023-11-28 | Stop reason: HOSPADM

## 2023-11-28 RX ORDER — IPRATROPIUM BROMIDE AND ALBUTEROL SULFATE 2.5; .5 MG/3ML; MG/3ML
3 SOLUTION RESPIRATORY (INHALATION) ONCE AS NEEDED
Status: DISCONTINUED | OUTPATIENT
Start: 2023-11-28 | End: 2023-11-28 | Stop reason: HOSPADM

## 2023-11-28 RX ORDER — PRAVASTATIN SODIUM 40 MG
40 TABLET ORAL DAILY
Status: DISCONTINUED | OUTPATIENT
Start: 2023-11-28 | End: 2023-11-29 | Stop reason: HOSPADM

## 2023-11-28 RX ORDER — NITROGLYCERIN 0.4 MG/1
0.4 TABLET SUBLINGUAL
Status: DISCONTINUED | OUTPATIENT
Start: 2023-11-28 | End: 2023-11-28 | Stop reason: HOSPADM

## 2023-11-28 RX ORDER — ACETAMINOPHEN 325 MG/1
650 TABLET ORAL EVERY 4 HOURS PRN
Status: DISCONTINUED | OUTPATIENT
Start: 2023-11-28 | End: 2023-11-28 | Stop reason: HOSPADM

## 2023-11-28 RX ADMIN — LIDOCAINE HYDROCHLORIDE 50 MG: 10 INJECTION, SOLUTION EPIDURAL; INFILTRATION; INTRACAUDAL; PERINEURAL at 10:02

## 2023-11-28 RX ADMIN — PRAVASTATIN SODIUM 40 MG: 40 TABLET ORAL at 16:40

## 2023-11-28 RX ADMIN — SUGAMMADEX 500 MG: 100 INJECTION, SOLUTION INTRAVENOUS at 10:44

## 2023-11-28 RX ADMIN — Medication 100 MCG: at 10:28

## 2023-11-28 RX ADMIN — SODIUM CHLORIDE 2 G: 900 INJECTION INTRAVENOUS at 09:56

## 2023-11-28 RX ADMIN — ROCURONIUM BROMIDE 15 MG: 10 SOLUTION INTRAVENOUS at 10:38

## 2023-11-28 RX ADMIN — SODIUM CHLORIDE: 9 INJECTION, SOLUTION INTRAVENOUS at 09:51

## 2023-11-28 RX ADMIN — APIXABAN 5 MG: 5 TABLET, FILM COATED ORAL at 20:29

## 2023-11-28 RX ADMIN — DONEPEZIL HYDROCHLORIDE 10 MG: 10 TABLET, FILM COATED ORAL at 20:29

## 2023-11-28 RX ADMIN — ROCURONIUM BROMIDE 50 MG: 10 SOLUTION INTRAVENOUS at 10:02

## 2023-11-28 RX ADMIN — PROPOFOL 125 MG: 10 INJECTION, EMULSION INTRAVENOUS at 10:02

## 2023-11-28 RX ADMIN — METOPROLOL TARTRATE 100 MG: 100 TABLET, FILM COATED ORAL at 20:29

## 2023-11-28 RX ADMIN — ASPIRIN 81 MG: 81 TABLET, COATED ORAL at 16:40

## 2023-11-28 RX ADMIN — FINASTERIDE 5 MG: 5 TABLET, FILM COATED ORAL at 16:40

## 2023-11-28 NOTE — ANESTHESIA POSTPROCEDURE EVALUATION
Patient: Darien Camarena    Procedure Summary       Date: 11/28/23 Room / Location: [facility] / [facility]    Anesthesia Start: 0951 Anesthesia Stop:     Procedure: Atrial Appendage Occlusion Diagnosis:       Paroxysmal atrial fibrillation      H/O: GI bleed      (atrial fibrillation)    Providers: Anthony Mcdaniel MD Provider: Jam Heck MD    Anesthesia Type: general ASA Status: 3            Anesthesia Type: general    Vitals  Vitals Value Taken Time   /109 11/28/23 1230   Temp 97.5 °F (36.4 °C) 11/28/23 1059   Pulse 90 11/28/23 1230   Resp 14 11/28/23 1056   SpO2 96 % 11/28/23 1230           Post Anesthesia Care and Evaluation    Patient location during evaluation: PACU  Patient participation: complete - patient participated  Level of consciousness: awake and alert  Pain management: adequate    Airway patency: patent  Anesthetic complications: No anesthetic complications  PONV Status: none  Cardiovascular status: hemodynamically stable and acceptable  Respiratory status: nonlabored ventilation, acceptable and nasal cannula  Hydration status: acceptable

## 2023-11-28 NOTE — H&P
[facility] Cardiology   History and Physical           IDENTIFICATION: 88 year-old  male residing in [place], [place]      Active Hospital Problems    Diagnosis  POA    **H/O: GI bleed [Z87.19]  Not Applicable    Paroxysmal atrial fibrillation [I48.0]  Unknown     Diagnosed August 2012.   FARHAD-guided ECV, 8/17/2012  CHADS-VASc 5 (age > 75, CAD, HTN, DM)  Successful external cardioversion for asymptomatic atrial fibrillation with RVR, 10/25/18  Successful cardioversion for atrial fibrillation RVR, 3/6/19.  Sotalol increased  Minimally symptomatic atrial fibrillation with cardioversion and the ER, 10/31/2019  ED cardioversion for A. fib with RVR, 7/21/2020  ED cardioversion for asymptomatic A. fib with RVR, 12/27/2020   ED cardioversion for asymptomatic A. fib with RVR x 2  occasions, March 2021  Transition from sotalol to amiodarone, 4/14/2021  Successful FARHAD/ECV May 3, 2021  Successful cardioversion, 8/5/2022  Echo 8/22 EF 50%, anatomically and functionally normal valves     Problem List  Persistent atrial fibrillation  Diagnosed August 2012.   FARHAD-guided ECV, 8/17/2012  CHADS-VASc 5 (age > 75, CAD, HTN, DM)  Successful external cardioversion for asymptomatic atrial fibrillation with RVR, 10/25/18  Successful cardioversion for atrial fibrillation RVR, 3/6/19.  Sotalol increased  Minimally symptomatic atrial fibrillation with cardioversion and the ER, 10/31/2019  ED cardioversion for A. fib with RVR, 7/21/2020  ED cardioversion for asymptomatic A. fib with RVR, 12/27/2020   ED cardioversion for asymptomatic A. fib with RVR x 2  occasions, March 2021  Transition from sotalol to amiodarone, 4/14/2021  Successful FARHAD/ECV May 3, 2021: normal LVEF, mild MR  Successful cardioversion, 8/5/2022  Echo 8/22 EF 50%, anatomically and functionally normal valves  Unsuccessful ECV 6/08/2023 - pt converted spontaneously to SR 2 min after ECV  GI Bleed - on Xarelto  CAD   Nuclear MPS  09/15/2022:small-sized, mildly severe area of ischemia located in the apex.  LVEF 56%  [facility] 9/29/2022 - mild CAD  HTN  HLD  DM II  GERD  CKD Stage 3  Gout  STEVEN           History of Present Illness: Darien Camarena is an 88 year-old male with above pertinent medical history, who presents today for left atrial appendage occlusion. Patient has a history of persistent atrial fibrillation and was previously maintained on sotalol and amiodarone. He is now on metoprolol for rate control alone, as he is largely asymptomatic, with preserved EF. The patient had unsuccessful cardioversion back in June 2023. He has also had complications with anticoagulation, including GI bleed with Xarelto, and more recently, recurrent hematuria with self-cathing, on Eliquis, prompting consideration of LAAO.  He denies any chest pain, pressure, or tightness.  States his shortness of breath due to COPD has been stable.    Allergies   Allergen Reactions    Xarelto [Rivaroxaban] GI Bleeding     GI bleed    Penicillins Hives     Has tolerated cefepime, ceftriaxone       Prior to Admission medications    Medication Sig Start Date End Date Taking? Authorizing Provider   albuterol sulfate  (90 Base) MCG/ACT inhaler Inhale 2 puffs Every 4 (Four) Hours As Needed for Wheezing. 11/7/23   Vikram Eagle MD   allopurinol (ZYLOPRIM) 300 MG tablet Take 1 tablet by mouth once daily 11/10/23   Benja Campbell MD   apixaban (Eliquis) 5 MG tablet tablet Take 1 tablet by mouth Every 12 (Twelve) Hours. 9/12/23   Titus Oliveros IV, MD   Ascorbic Acid (VITAMIN C) 500 MG capsule Take 1 tablet by mouth 4 (four) times a day. 2/27/14   Manisha West MD   aspirin 81 MG EC tablet Take 1 tablet by mouth Daily. 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. 11/28/23   Haja Henry APRN   docusate sodium (COLACE) 100 MG capsule Take 2 capsules by mouth At Night As Needed.    Manisha West MD   donepezil (Aricept) 10 MG tablet Take 1 tablet by  mouth Every Night. 10/30/23   Pito Way MD   Ferrous Gluconate (IRON) 240 (27 FE) MG tablet Take 1 tablet by mouth Daily. 2/27/14   Manisha West MD   finasteride (PROSCAR) 5 MG tablet TAKE 1 TABLET BY MOUTH EVERY DAY AT BEDTIME 11/10/23   Benja Campbell MD   furosemide (Lasix) 20 MG tablet Take 1 tablet by mouth 2 (Two) Times a Day. 6/21/23   Pito Way MD   metFORMIN (GLUCOPHAGE) 1000 MG tablet Take 1 tablet by mouth twice daily  Patient taking differently: 1 tablet 2 (Two) Times a Day With Meals. 10/25/23   Pito Way MD   metoprolol tartrate (LOPRESSOR) 100 MG tablet Take 1 tablet by mouth 2 (Two) Times a Day. 9/28/23   Anthony Mcdaniel MD   multivitamin with minerals (MULTIVITAMIN MEN 50+ PO) Take 1 tablet by mouth Daily. Centrum Sliver    Manisha West MD   omeprazole (priLOSEC) 20 MG capsule Take 1 capsule by mouth once daily 8/31/23   Pito Way MD   potassium chloride (K-DUR,KLOR-CON) 20 MEQ CR tablet Take 1 tablet by mouth Daily. 6/22/23   Pito Way MD   pravastatin (PRAVACHOL) 40 MG tablet Take 1 tablet by mouth once daily 11/10/23   Benja Campbell MD   Probiotic Product (PROBIOTIC ADVANCED PO) Take 1 tablet by mouth 2 (Two) Times a Day.    Manisha West MD   sertraline (ZOLOFT) 50 MG tablet Take 1 tablet by mouth once daily 11/10/23   Benja Campbell MD   tamsulosin (FLOMAX) 0.4 MG capsule 24 hr capsule Take 1 capsule by mouth once daily 8/22/23   Pito Way MD   tiotropium bromide-olodaterol (STIOLTO RESPIMAT) 2.5-2.5 MCG/ACT aerosol solution inhaler Inhale 2 puffs Daily. 2 inh once a day 11/7/23   Vikram Eagle MD   valsartan (DIOVAN) 80 MG tablet Take 1 tablet by mouth Daily. 9/12/23   Titus Oliveros IV, MD       Past Medical History:   Diagnosis Date    Arrhythmia     Atrial fibrillation     Bilateral leg cramps     possible new medication related side effects    Chronic kidney disease     Clotting  disorder     pt doesnt know about this    COPD (chronic obstructive pulmonary disease)     Coronary artery disease     Diabetes mellitus     doesnt check sugar    E. coli sepsis 2022    Enlarged prostate without lower urinary tract symptoms (luts) 2016    Full dentures     GERD (gastroesophageal reflux disease)     Gout     Hearing aid worn     bilat prn    History of colonoscopy 2012    History of radiation therapy 2023    SBRT LLL lung    [place] (hard of hearing)     hearing aids prn    Hyperlipidemia     Hypertension     Kidney stone     surgery x1    Lung cancer     STEVEN on CPAP     compliant with machine    PAF (paroxysmal atrial fibrillation)     Prostatism     Sleep apnea     CPAP HS    Urinary incontinence     Urinary tract infection     Wears glasses     readers       Past Surgical History:   Procedure Laterality Date    CARDIAC CATHETERIZATION Left 2022    Procedure: Left Heart Cath;  Surgeon: Titus Oliveros IV, MD;  Location: [facility] CATH INVASIVE LOCATION;  Service: Cardiovascular;  Laterality: Left;    CARDIOVERSION      CATARACT EXTRACTION Bilateral     COLONOSCOPY      CYSTOSCOPY      ENDOSCOPY      possible    KIDNEY STONE SURGERY      x1       Family History   Problem Relation Age of Onset    Alzheimer's disease Mother     COPD Father     Lung cancer Father     Cancer Father     Kidney disease Father     No Known Problems Daughter     No Known Problems Daughter     No Known Problems Daughter        Social History     Tobacco Use   Smoking Status Former    Packs/day: 1.00    Years: 15.00    Additional pack years: 0.00    Total pack years: 15.00    Types: Cigarettes    Start date: 1947    Quit date: 1960    Years since quittin.5    Passive exposure: Past   Smokeless Tobacco Former    Types: Chew    Quit date:    Tobacco Comments    started at 13 yo.       Social History     Substance and Sexual Activity   Alcohol Use No         Review of Systems:    Review of Systems   Cardiovascular:  Positive for dyspnea on exertion and leg swelling.   All other systems reviewed and are negative.           Vital Sign Min/Max for last 24 hours  No data recorded   No data recorded   No data recorded   No data recorded   No data recorded   No data recorded    No intake or output data in the 24 hours ending 11/28/23 0817        Tele:  afib    Vitals reviewed.   Pulmonary:      Effort: Pulmonary effort is normal.      Breath sounds: Normal breath sounds.   Cardiovascular:      PMI at left midclavicular line. Normal rate. Irregular rhythm. Normal S1. Normal S2.       Murmurs: There is no murmur.   Pulses:     Intact distal pulses.   Edema:     Pretibial: bilateral 3+ pitting edema of the pretibial area.     Ankle: bilateral 3+ pitting edema of the ankle.     Feet: bilateral 3+ pitting edema of the feet.  Skin:     General: Skin is warm.      Capillary Refill: Capillary refill takes less than 2 seconds.              DATA REVIEW:      @IPYork Mailing@        Lab Results   Lab Value Date/Time    MG 2.1 06/24/2022 0438             Lab Results   Component Value Date    HGBA1C 6.20 (H) 10/30/2023     Lab Results   Component Value Date    CHOL 98 10/30/2023    TRIG 127 10/30/2023    HDL 34 (L) 10/30/2023    LDL 41 10/30/2023            Persistent atrial fibrillation  Previously was maintained on amiodarone and sotalol.  Currently pursuing rate control alone, with metoprolol 100 mg twice daily.  History of GI bleeding as well as persistent hematuria due to self-catheterization.                 Proceed with Watchman device implantation.  Procedure, risks, benefits have been discussed and patient is agreeable to proceed.  Ancef 2 g prior to procedure.  Start daily aspirin.  Last dose of Eliquis was last night.  Return for follow-up 1/4/2024, with Dr. Mcdaniel.  Further recommendations to follow procedure.    BERYL Carmona        Electronically signed by BERYL Carmona, 11/28/23, 8:17  AM EST.

## 2023-11-28 NOTE — NURSING NOTE
LAAO Procedure Information   Darien Felix Ray 1935   [address] [place] [address] [phone] (home)       SIVAKUMAR Orifice Maximal Width: 22  mm  Cumulative Air Kerma:  105 mGy  Contrast Volume:  20 mL  Dose Area Product:  1584.95 ?Gy-M2    Fior Alvarado RN

## 2023-11-28 NOTE — ANESTHESIA PREPROCEDURE EVALUATION
Anesthesia Evaluation     Patient summary reviewed and Nursing notes reviewed   NPO Solid Status: > 8 hours  NPO Liquid Status: > 2 hours           Airway   Mallampati: I  TM distance: >3 FB  Neck ROM: full  No difficulty expected  Dental    (+) upper dentures, edentulous and lower dentures    Pulmonary    (+) a smoker Former, cigarettes, lung cancer, COPD moderate,sleep apnea on CPAP  (-) shortness of breath, recent URI, no home oxygen  Cardiovascular     ECG reviewed    (+) hypertension, CAD (MILD CAD), dysrhythmias Atrial Fib, hyperlipidemia  (-) past MI, angina    ROS comment: ECG A FIB rvr   ECHO  8/22 EF 50%, anatomically and functionally nor  CAD Nuclear MPS 2022:small-sized, mildly severe area of ischemia located in the apex.  LVEF 56%  [facility] 2022 - mild CAD      Neuro/Psych  (-) seizures, CVA  GI/Hepatic/Renal/Endo    (+) GERD, diabetes mellitus (A1C 6.2 borderline) type 2 well controlled  (-) no renal disease, no thyroid disorder    Musculoskeletal     Abdominal    Substance History      OB/GYN          Other      history of cancer (LLL lung)    ROS/Med Hx Other: SELF CATHS -BLADDER MALFUNCTION ? CAUSE                 Anesthesia Plan    ASA 3     general     intravenous induction     Anesthetic plan, risks, benefits, and alternatives have been provided, discussed and informed consent has been obtained with: patient.    Plan discussed with CRNA.      CODE STATUS:

## 2023-11-28 NOTE — ANESTHESIA PROCEDURE NOTES
Airway  Urgency: elective    Date/Time: 11/28/2023 10:04 AM  Airway not difficult    General Information and Staff    Patient location during procedure: OR  CRNA/CAA: Yao Gallegos CRNA    Indications and Patient Condition  Indications for airway management: airway protection    Preoxygenated: yes  MILS not maintained throughout  Mask difficulty assessment: 1 - vent by mask    Final Airway Details  Final airway type: endotracheal airway      Successful airway: ETT  Cuffed: yes   Successful intubation technique: direct laryngoscopy  Endotracheal tube insertion site: oral  Blade: Gardner  Blade size: 2  ETT size (mm): 7.5  Cormack-Lehane Classification: grade I - full view of glottis  Placement verified by: chest auscultation and capnometry   Measured from: lips  ETT/EBT  to lips (cm): 20  Number of attempts at approach: 1  Assessment: lips, teeth, and gum same as pre-op and atraumatic intubation    Additional Comments  Negative epigastric sounds, Breath sound equal bilaterally with symmetric chest rise and fall

## 2023-11-29 ENCOUNTER — READMISSION MANAGEMENT (OUTPATIENT)
Dept: CALL CENTER | Facility: HOSPITAL | Age: 88
End: 2023-11-29
Payer: MEDICARE

## 2023-11-29 VITALS
HEART RATE: 85 BPM | OXYGEN SATURATION: 96 % | BODY MASS INDEX: 33.94 KG/M2 | DIASTOLIC BLOOD PRESSURE: 102 MMHG | SYSTOLIC BLOOD PRESSURE: 142 MMHG | TEMPERATURE: 98 F | RESPIRATION RATE: 16 BRPM | WEIGHT: 272.93 LBS | HEIGHT: 75 IN

## 2023-11-29 LAB
ANION GAP SERPL CALCULATED.3IONS-SCNC: 9 MMOL/L (ref 5–15)
BUN SERPL-MCNC: 21 MG/DL (ref 8–23)
BUN/CREAT SERPL: 24.7 (ref 7–25)
CALCIUM SPEC-SCNC: 9.2 MG/DL (ref 8.6–10.5)
CHLORIDE SERPL-SCNC: 101 MMOL/L (ref 98–107)
CO2 SERPL-SCNC: 29 MMOL/L (ref 22–29)
CREAT SERPL-MCNC: 0.85 MG/DL (ref 0.76–1.27)
DEPRECATED RDW RBC AUTO: 51.7 FL (ref 37–54)
EGFRCR SERPLBLD CKD-EPI 2021: 84.1 ML/MIN/1.73
ERYTHROCYTE [DISTWIDTH] IN BLOOD BY AUTOMATED COUNT: 14.2 % (ref 12.3–15.4)
GLUCOSE SERPL-MCNC: 141 MG/DL (ref 65–99)
HCT VFR BLD AUTO: 41.8 % (ref 37.5–51)
HGB BLD-MCNC: 13.5 G/DL (ref 13–17.7)
INR PPP: 1.45 (ref 0.89–1.12)
MCH RBC QN AUTO: 32.1 PG (ref 26.6–33)
MCHC RBC AUTO-ENTMCNC: 32.3 G/DL (ref 31.5–35.7)
MCV RBC AUTO: 99.5 FL (ref 79–97)
PLATELET # BLD AUTO: 122 10*3/MM3 (ref 140–450)
PMV BLD AUTO: 11.2 FL (ref 6–12)
POTASSIUM SERPL-SCNC: 4.3 MMOL/L (ref 3.5–5.2)
PROTHROMBIN TIME: 17.8 SECONDS (ref 12.2–14.5)
RBC # BLD AUTO: 4.2 10*6/MM3 (ref 4.14–5.8)
SODIUM SERPL-SCNC: 139 MMOL/L (ref 136–145)
WBC NRBC COR # BLD AUTO: 6.81 10*3/MM3 (ref 3.4–10.8)

## 2023-11-29 PROCEDURE — 80048 BASIC METABOLIC PNL TOTAL CA: CPT | Performed by: INTERNAL MEDICINE

## 2023-11-29 PROCEDURE — 99238 HOSP IP/OBS DSCHRG MGMT 30/<: CPT | Performed by: INTERNAL MEDICINE

## 2023-11-29 PROCEDURE — 94640 AIRWAY INHALATION TREATMENT: CPT

## 2023-11-29 PROCEDURE — 85610 PROTHROMBIN TIME: CPT | Performed by: INTERNAL MEDICINE

## 2023-11-29 PROCEDURE — 85027 COMPLETE CBC AUTOMATED: CPT | Performed by: INTERNAL MEDICINE

## 2023-11-29 PROCEDURE — 94799 UNLISTED PULMONARY SVC/PX: CPT

## 2023-11-29 RX ORDER — IPRATROPIUM BROMIDE AND ALBUTEROL SULFATE 2.5; .5 MG/3ML; MG/3ML
3 SOLUTION RESPIRATORY (INHALATION) ONCE
Status: COMPLETED | OUTPATIENT
Start: 2023-11-29 | End: 2023-11-29

## 2023-11-29 RX ADMIN — FINASTERIDE 5 MG: 5 TABLET, FILM COATED ORAL at 09:09

## 2023-11-29 RX ADMIN — IPRATROPIUM BROMIDE AND ALBUTEROL SULFATE 3 ML: 2.5; .5 SOLUTION RESPIRATORY (INHALATION) at 15:36

## 2023-11-29 RX ADMIN — ASPIRIN 81 MG: 81 TABLET, COATED ORAL at 09:10

## 2023-11-29 RX ADMIN — SERTRALINE 50 MG: 50 TABLET, FILM COATED ORAL at 09:09

## 2023-11-29 RX ADMIN — PRAVASTATIN SODIUM 40 MG: 40 TABLET ORAL at 09:09

## 2023-11-29 RX ADMIN — TAMSULOSIN HYDROCHLORIDE 0.4 MG: 0.4 CAPSULE ORAL at 09:09

## 2023-11-29 RX ADMIN — APIXABAN 5 MG: 5 TABLET, FILM COATED ORAL at 09:09

## 2023-11-29 RX ADMIN — METOPROLOL TARTRATE 100 MG: 100 TABLET, FILM COATED ORAL at 09:09

## 2023-11-29 RX ADMIN — VALSARTAN 80 MG: 80 TABLET, FILM COATED ORAL at 09:09

## 2023-11-29 NOTE — OUTREACH NOTE
Prep Survey      Flowsheet Row Responses   [facility] facility patient discharged from? [facility]   Is LACE score < 7 ? No   Eligibility [facility]   Date of Admission 11/28/23   Date of Discharge 11/29/23   Discharge Disposition Home or Self Care   Discharge diagnosis GI bleed   Does the patient have one of the following disease processes/diagnoses(primary or secondary)? Other   Does the patient have Home health ordered? No   Is there a DME ordered? No   Prep survey completed? Yes            REI VENCES - Registered Nurse

## 2023-11-29 NOTE — CASE MANAGEMENT/SOCIAL WORK
Discharge Planning Assessment  [facility]     Patient Name: Darien Camarena  MRN: [id number]  Today's Date: 11/29/2023    Admit Date: 11/28/2023    Plan: discharge plan   Discharge Needs Assessment       Row Name 11/29/23 1127       Living Environment    People in Home alone    Primary Care Provided by self    Provides Primary Care For no one    Family Caregiver if Needed child(ann), adult    Family Caregiver Names Nikki Young, Columba Hayden and Dolly Camarena(adult daughters)    Quality of Family Relationships helpful;supportive    Able to Return to Prior Arrangements yes    Living Arrangement Comments I met with pt at bedside with permission regarding discharge plan. Pt resides in [facility] in a home alone       Resource/Environmental Concerns    Resource/Environmental Concerns none       Transition Planning    Patient/Family Anticipates Transition to home    Patient/Family Anticipated Services at Transition     Transportation Anticipated family or friend will provide       Discharge Needs Assessment    Readmission Within the Last 30 Days no previous admission in last 30 days    Equipment Currently Used at Home cpap;cane, straight    Concerns to be Addressed discharge planning    Equipment Needed After Discharge cpap;cane, straight    Discharge Coordination/Progress Pt confirms that he has [employer] Medicare Replacement insurance with prescription coverage and uses [facility] on [address] in [place]. Pt reports he is independent with ADLs and uses a straight cane for mobility. Pt reports he still drives. He is not current with any HH at this time. Pt has a history of persistent Afib, GI bleedi and had watchman implant. Plan is home at discharge and pt does not anticipate discharge needs. His daughter Nikki lives close to him and will assist him at home if needed, including providing transportation home. CM will cont to follow                   Discharge Plan       Row Name 11/29/23  1134       Plan    Plan discharge plan    Plan Comments Plan is home at discharge and pt does not anticipate discharge needs. His daughter Nikki lives close to him and will assist him at home if needed, including providing transportation home. CM will cont to follow    Final Discharge Disposition Code 01 - home or self-care                  Continued Care and Services - Admitted Since 11/28/2023    Coordination has not been started for this encounter.       Expected Discharge Date and Time       Expected Discharge Date Expected Discharge Time    Dec 1, 2023            Demographic Summary       Row Name 11/29/23 1126       General Information    General Information Comments PCP is MARJORIE KARIMI       Contact Information    Permission Granted to Share Info With     Contact Information Obtained for            Name,  Nikki Young(daughter) [phone]                   Functional Status       Row Name 11/29/23 1127       Functional Status    Functional Status Comments Pt reports he is independent with ADLs and still drives                   Psychosocial    No documentation.                  Abuse/Neglect    No documentation.                  Legal    No documentation.                  Substance Abuse    No documentation.                  Patient Forms    No documentation.                     Cynthia Winkler RN

## 2023-11-30 ENCOUNTER — OFFICE VISIT (OUTPATIENT)
Dept: INTERNAL MEDICINE | Facility: CLINIC | Age: 88
End: 2023-11-30
Payer: MEDICARE

## 2023-11-30 ENCOUNTER — TELEPHONE (OUTPATIENT)
Dept: INTERNAL MEDICINE | Facility: CLINIC | Age: 88
End: 2023-11-30

## 2023-11-30 ENCOUNTER — TRANSITIONAL CARE MANAGEMENT TELEPHONE ENCOUNTER (OUTPATIENT)
Dept: CALL CENTER | Facility: HOSPITAL | Age: 88
End: 2023-11-30
Payer: MEDICARE

## 2023-11-30 VITALS
BODY MASS INDEX: 34.5 KG/M2 | SYSTOLIC BLOOD PRESSURE: 138 MMHG | WEIGHT: 276 LBS | OXYGEN SATURATION: 94 % | DIASTOLIC BLOOD PRESSURE: 88 MMHG | RESPIRATION RATE: 18 BRPM | HEART RATE: 104 BPM | TEMPERATURE: 97.6 F

## 2023-11-30 DIAGNOSIS — I10 ESSENTIAL HYPERTENSION: ICD-10-CM

## 2023-11-30 DIAGNOSIS — R60.0 LOCALIZED EDEMA: ICD-10-CM

## 2023-11-30 DIAGNOSIS — R41.3 MEMORY LOSS: Primary | ICD-10-CM

## 2023-11-30 LAB
ANION GAP SERPL CALCULATED.3IONS-SCNC: 9.5 MMOL/L (ref 5–15)
BUN SERPL-MCNC: 22 MG/DL (ref 8–23)
BUN/CREAT SERPL: 20.4 (ref 7–25)
CALCIUM SPEC-SCNC: 9.2 MG/DL (ref 8.6–10.5)
CHLORIDE SERPL-SCNC: 104 MMOL/L (ref 98–107)
CO2 SERPL-SCNC: 30.5 MMOL/L (ref 22–29)
CREAT SERPL-MCNC: 1.08 MG/DL (ref 0.76–1.27)
EGFRCR SERPLBLD CKD-EPI 2021: 66.4 ML/MIN/1.73
GLUCOSE SERPL-MCNC: 93 MG/DL (ref 65–99)
POTASSIUM SERPL-SCNC: 4.4 MMOL/L (ref 3.5–5.2)
SODIUM SERPL-SCNC: 144 MMOL/L (ref 136–145)

## 2023-11-30 PROCEDURE — 99214 OFFICE O/P EST MOD 30 MIN: CPT | Performed by: INTERNAL MEDICINE

## 2023-11-30 PROCEDURE — 80048 BASIC METABOLIC PNL TOTAL CA: CPT | Performed by: INTERNAL MEDICINE

## 2023-11-30 PROCEDURE — 36415 COLL VENOUS BLD VENIPUNCTURE: CPT | Performed by: INTERNAL MEDICINE

## 2023-11-30 RX ORDER — MEMANTINE HYDROCHLORIDE 5 MG-10 MG
KIT ORAL
Qty: 49 TABLET | Refills: 0 | Status: SHIPPED | OUTPATIENT
Start: 2023-11-30

## 2023-11-30 RX ORDER — METOLAZONE 2.5 MG/1
2.5 TABLET ORAL DAILY
Qty: 30 TABLET | Refills: 2 | Status: SHIPPED | OUTPATIENT
Start: 2023-11-30

## 2023-11-30 NOTE — TELEPHONE ENCOUNTER
Patient needs today's office visit note to be a face to face for him to be able to get his walker from [facility]. Needs this note faxed to [employer] at [phone] as soon as possible as they already have the order for the walker but will not get it to him until they have the F2F note.

## 2023-11-30 NOTE — DISCHARGE SUMMARY
Cardiac Electrophysiology Discharge Summary         [facility]        Discharge Summary       PATIENT NAME:  Darien Camarena    :  1935 AGE: 88 y.o.     Admission Date: 2023   Discharge Date: 2023     Reason for Admission:     H/O: GI bleed    Paroxysmal atrial fibrillation    Presence of Watchman left atrial appendage closure device    AF (paroxysmal atrial fibrillation)      Procedures     S/p Watchman procedure    Brief Summary of Hospital Course    88-year-old man with past medical history of atrial fibrillation on Xarelto complicated by GI bleed and hematuria with self-catheterization presenting to [facility] for elective inpatient Watchman implantation procedure.  Patient also has a history of COPD on maintenance and rescue inhaler.  Postprocedure patient had slight hypoxia and observed overnight.  Patient got his COPD treatment and his oxygenation improved and was discharged without complication      Discharge Instructions     Home or Self Care    Activity:  No heavy lifting.  Diet:  general  Follow-up  To office in: in 12 weeks       Discharge Medications          Discharge Medications        Changes to Medications        Instructions Start Date   aspirin 81 MG EC tablet  What changed: Another medication with the same name was added. Make sure you understand how and when to take each.   81 mg, Oral, Daily, 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.      aspirin 81 MG EC tablet  What changed: You were already taking a medication with the same name, and this prescription was added. Make sure you understand how and when to take each.   81 mg, Oral, Daily      finasteride 5 MG tablet  Commonly known as: PROSCAR  What changed: when to take this   5 mg, Oral, Every Night at Bedtime      metFORMIN 1000 MG tablet  Commonly known as: GLUCOPHAGE  What changed: when to take this   Take 1 tablet by mouth twice daily      pravastatin 40 MG  tablet  Commonly known as: PRAVACHOL  What changed: when to take this   40 mg, Oral, Daily             Continue These Medications        Instructions Start Date   albuterol sulfate  (90 Base) MCG/ACT inhaler  Commonly known as: PROVENTIL HFA;VENTOLIN HFA;PROAIR HFA   2 puffs, Inhalation, Every 4 Hours PRN      allopurinol 300 MG tablet  Commonly known as: ZYLOPRIM   300 mg, Oral, Daily      apixaban 5 MG tablet tablet  Commonly known as: Eliquis   5 mg, Oral, Every 12 Hours      ascorbic acid 1000 MG tablet  Commonly known as: VITAMIN C   1,000 mg, Oral, 2 Times Daily      Coenzyme Q10 300 MG capsule   1 capsule, Oral, Nightly      docusate sodium 100 MG capsule  Commonly known as: COLACE   200 mg, Oral, Nightly PRN      donepezil 10 MG tablet  Commonly known as: Aricept   10 mg, Oral, Nightly      furosemide 20 MG tablet  Commonly known as: Lasix   20 mg, Oral, 2 Times Daily      Iron 240 (27 Fe) MG tablet   1 tablet, Oral, Daily      methenamine 1 g tablet  Commonly known as: HIPREX   1 g, Oral, 2 Times Daily With Meals      metoprolol tartrate 100 MG tablet  Commonly known as: LOPRESSOR   100 mg, Oral, 2 Times Daily      multivitamin with minerals tablet tablet   1 tablet, Oral, Nightly, Centrum Sliver       omeprazole 20 MG capsule  Commonly known as: priLOSEC   20 mg, Oral, Daily      potassium chloride 20 MEQ CR tablet  Commonly known as: K-DUR,KLOR-CON   20 mEq, Oral, Daily      PROBIOTIC ADVANCED PO   1 tablet, Oral, 2 Times Daily      sertraline 50 MG tablet  Commonly known as: ZOLOFT   Take 1 tablet by mouth once daily      tamsulosin 0.4 MG capsule 24 hr capsule  Commonly known as: FLOMAX   0.4 mg, Oral, Daily      tiotropium bromide-olodaterol 2.5-2.5 MCG/ACT aerosol solution inhaler  Commonly known as: STIOLTO RESPIMAT   2 puffs, Inhalation, Daily, 2 inh once a day      valsartan 80 MG tablet  Commonly known as: DIOVAN   80 mg, Oral, Daily               BP (!) 142/102 (BP Location: Left arm,  "Patient Position: Sitting)   Pulse 85   Temp 98 °F (36.7 °C) (Oral)   Resp 16   Ht 190.5 cm (75\")   Wt 124 kg (272 lb 14.9 oz)   SpO2 96%   BMI 34.11 kg/m²   Admit Weight  Weight: 124 kg (272 lb 14.9 oz)  [unfilled]    CBC:   Results from last 7 days   Lab Units 11/29/23  0442   WBC 10*3/mm3 6.81   HEMOGLOBIN g/dL 13.5   HEMATOCRIT % 41.8   MCV fL 99.5*   PLATELETS 10*3/mm3 122*         BMP:  Results from last 7 days   Lab Units 11/29/23  0442   POTASSIUM mmol/L 4.3   CHLORIDE mmol/L 101   CO2 mmol/L 29.0   BUN mg/dL 21   CREATININE mg/dL 0.85   GLUCOSE mg/dL 141*   CALCIUM mg/dL 9.2     PT/INR:   Results from last 7 days   Lab Units 11/29/23  0004   PROTIME Seconds 17.8*   INR  1.45*     APTT:     MAG:     D Dimer:     Troponin I     ProBNP       PHYSICAL EXAM  General appearance: awake, alert, oriented, moves all extremities  Lungs: no rhonchi, no wheezes, no rales  Heart: IRIR, no m/r/g  Abdomen: positive bowel sounds, no bruits, no masses  Extremities: warm and dry, no cyanosis, no clubbing       Anthony Mcdaniel MD   Cardiac Electrophysiologist    Dictated Utilizing Dragon Dictation    "

## 2023-11-30 NOTE — OUTREACH NOTE
Call Center TCM Note      Flowsheet Row Responses   [facility] patient discharged from? [place]   Does the patient have one of the following disease processes/diagnoses(primary or secondary)? Other   TCM attempt successful? Yes   TCM call completed? Yes   Wrap up additional comments TCM appt completed on 11/30/23--PCP appointment within 2 business days of DC fulfills the TCM requirement, no call necessary.            Kassidy Coulter RN    11/30/2023, 14:47 EST

## 2023-11-30 NOTE — PROGRESS NOTES
Chief Complaint   Patient presents with    Follow-up     1 month follow up chronic medical problems       History of Present Illness      The patient presents for a follow-up related to memory loss. His symptoms are reported to be stable. He is taking a medication. The medication is taken as prescribed. There are no medication side effects. The patient is able to stay alone.    The patient presents for a follow-up related to hypertension. He states that he is taking his medication as prescribed. He is not having medication side effects.    The patient presents for follow-up of edema of both legs. The edema is painful. He denies excessive sodium intake. The patient denies fevers, chills, sweats or abdominal pain. The edema has resolved. The patient is not wearing support hose. He is elevating his legs.    Review of Systems    PULMONARY- Denies Wheezing, Dyspnea, Sputum Production, Cough, Hemoptysis or Pleuritic Chest Pain.    CARDIOVASCULAR- Denies Chest Pain, Claudication, Edema, Syncope, Palpitations or Irregular Heart Beat.    NEUROLOGIC- Denies Seizures, Headaches, Visual Changes, Confusion, Memory Loss, Depression or Excessive Daytime Sleepiness.    Medications      Current Outpatient Medications:     albuterol sulfate  (90 Base) MCG/ACT inhaler, Inhale 2 puffs Every 4 (Four) Hours As Needed for Wheezing., Disp: 18 g, Rfl: 5    allopurinol (ZYLOPRIM) 300 MG tablet, Take 1 tablet by mouth once daily, Disp: 90 tablet, Rfl: 0    apixaban (Eliquis) 5 MG tablet tablet, Take 1 tablet by mouth Every 12 (Twelve) Hours., Disp: 180 tablet, Rfl: 3    ascorbic acid (VITAMIN C) 1000 MG tablet, Take 1 tablet by mouth 2 (Two) Times a Day., Disp: , Rfl:     aspirin 81 MG EC tablet, Take 1 tablet by mouth Daily. 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., Disp: 90 tablet, Rfl: 1    Coenzyme Q10 300 MG capsule, Take 1 capsule by mouth Every Night., Disp: , Rfl:     docusate sodium (COLACE) 100 MG capsule, Take 2 capsules  by mouth At Night As Needed for Constipation., Disp: , Rfl:     donepezil (Aricept) 10 MG tablet, Take 1 tablet by mouth Every Night., Disp: 30 tablet, Rfl: 2    Ferrous Gluconate (IRON) 240 (27 FE) MG tablet, Take 1 tablet by mouth Daily., Disp: , Rfl:     finasteride (PROSCAR) 5 MG tablet, TAKE 1 TABLET BY MOUTH EVERY DAY AT BEDTIME (Patient taking differently: Take 1 tablet by mouth Every Night.), Disp: 90 tablet, Rfl: 0    furosemide (Lasix) 20 MG tablet, Take 1 tablet by mouth 2 (Two) Times a Day., Disp: 180 tablet, Rfl: 2    metFORMIN (GLUCOPHAGE) 1000 MG tablet, Take 1 tablet by mouth twice daily (Patient taking differently: Take 1 tablet by mouth 2 (Two) Times a Day With Meals.), Disp: 180 tablet, Rfl: 1    methenamine (HIPREX) 1 g tablet, Take 1 tablet by mouth 2 (Two) Times a Day With Meals., Disp: , Rfl:     metoprolol tartrate (LOPRESSOR) 100 MG tablet, Take 1 tablet by mouth 2 (Two) Times a Day., Disp: 180 tablet, Rfl: 3    multivitamin with minerals (MULTIVITAMIN MEN 50+ PO), Take 1 tablet by mouth Every Night. Centrum Sliver, Disp: , Rfl:     omeprazole (priLOSEC) 20 MG capsule, Take 1 capsule by mouth once daily, Disp: 90 capsule, Rfl: 1    potassium chloride (K-DUR,KLOR-CON) 20 MEQ CR tablet, Take 1 tablet by mouth Daily., Disp: 90 tablet, Rfl: 1    pravastatin (PRAVACHOL) 40 MG tablet, Take 1 tablet by mouth once daily (Patient taking differently: Take 1 tablet by mouth Every Night.), Disp: 90 tablet, Rfl: 0    Probiotic Product (PROBIOTIC ADVANCED PO), Take 1 tablet by mouth 2 (Two) Times a Day., Disp: , Rfl:     sertraline (ZOLOFT) 50 MG tablet, Take 1 tablet by mouth once daily (Patient taking differently: Take 1 tablet by mouth Daily.), Disp: 90 tablet, Rfl: 0    tamsulosin (FLOMAX) 0.4 MG capsule 24 hr capsule, Take 1 capsule by mouth once daily, Disp: 90 capsule, Rfl: 1    tiotropium bromide-olodaterol (STIOLTO RESPIMAT) 2.5-2.5 MCG/ACT aerosol solution inhaler, Inhale 2 puffs Daily. 2 inh  once a day, Disp: 1 each, Rfl: 3    valsartan (DIOVAN) 80 MG tablet, Take 1 tablet by mouth Daily., Disp: 90 tablet, Rfl: 1    memantine (Namenda Titration Delano) 28 x 5 MG & 21 x 10 MG tablet pack, Follow package directions., Disp: 49 tablet, Rfl: 0    metOLazone (ZAROXOLYN) 2.5 MG tablet, Take 1 tablet by mouth Daily., Disp: 30 tablet, Rfl: 2  No current facility-administered medications for this visit.     Allergies    Allergies   Allergen Reactions    Xarelto [Rivaroxaban] GI Bleeding     GI bleed    Penicillins Hives     Has tolerated cefepime, ceftriaxone       Problem List    Patient Active Problem List   Diagnosis    Atopic rhinitis    Benign (familial) paraproteinemia    Type 2 diabetes mellitus with stage 3 chronic kidney disease, without long-term current use of insulin    Enlarged prostate without lower urinary tract symptoms (luts)    Gastroesophageal reflux disease with esophagitis    Polyp of gallbladder    Gout    Hyperlipidemia LDL goal <100    Essential hypertension    Nephrolithiasis    Obstructive sleep apnea syndrome    Paroxysmal atrial fibrillation    Stage 3 chronic kidney disease    Long term current use of antiarrhythmic medical therapy    Hydronephrosis    Coronary artery disease involving native coronary artery of native heart with angina pectoris    Malignant neoplasm of lower lobe of left lung    Chronic bilateral low back pain without sciatica    Other microscopic hematuria    H/O: GI bleed    Presence of Watchman left atrial appendage closure device    AF (paroxysmal atrial fibrillation)       Medications, Allergies, Problems List and Past History were reviewed and updated.    Physical Examination    /88 (BP Location: Left arm, Patient Position: Sitting, Cuff Size: Adult)   Pulse 104   Temp 97.6 °F (36.4 °C) (Infrared)   Resp 18   Wt 125 kg (276 lb)   SpO2 94%   BMI 34.50 kg/m²       HEENT: Facies- Within normal limits. Lids- Normal bilaterally. Conjunctiva- Clear  bilaterally. Sclera- Anicteric bilaterally.    Neck: Thyroid- non enlarged, symmetric and has no nodules. No bruits are detected.    Lungs: Auscultation- Clear to auscultation bilaterally. There are no retractions, clubbing or cyanosis. The Expiratory to Inspiratory ratio is equal.    Lymph Nodes: Cervical- no enlarged lymph nodes noted. Clavicular- Deferred. Axillary- Deferred. Inguinal- Deferred.    Cardiovascular: There are no carotid bruits. Heart- Normal Rate with Regular rhythm and no murmurs. There are no gallops. There are no rubs. In the lower extremities there is bilateral 1+ pitting edema to the level of the ankles. The upper extremities do not have edema.    Abdomen: Soft, benign, non-tender with no masses, hernias, organomegaly or scars.    Neurologic Exam:    Cranial Nerves II-XII: Intact    Gait: Normal.    Impression and Assessment    Memory Loss.    Essential Hypertension.    Edema.    Plan    Essential Hypertension Plan: A medication was added as noted.    Edema Plan: The medications were adjusted as noted. A medication was added as noted.    Memory Loss Plan: The patient was instructed to continue the current medications. Medication will be added as noted.    Diagnoses and all orders for this visit:    1. Memory loss (Primary)  -     memantine (Namenda Titration Delano) 28 x 5 MG & 21 x 10 MG tablet pack; Follow package directions.  Dispense: 49 tablet; Refill: 0    2. Essential hypertension  -     metOLazone (ZAROXOLYN) 2.5 MG tablet; Take 1 tablet by mouth Daily.  Dispense: 30 tablet; Refill: 2  -     Basic Metabolic Panel; Future  -     Basic Metabolic Panel    3. Localized edema  -     metOLazone (ZAROXOLYN) 2.5 MG tablet; Take 1 tablet by mouth Daily.  Dispense: 30 tablet; Refill: 2  -     Basic Metabolic Panel; Future  -     Basic Metabolic Panel        Return to Office    The patient was instructed to return for follow-up in 1 month. The patient was instructed to return sooner if the condition  changes, worsens, or does not resolve.

## 2023-12-11 ENCOUNTER — TELEPHONE (OUTPATIENT)
Dept: INTERNAL MEDICINE | Facility: CLINIC | Age: 88
End: 2023-12-11
Payer: MEDICARE

## 2023-12-11 NOTE — TELEPHONE ENCOUNTER
Caller: HAILEY    Relationship: PATIENT AID     Best call back number: [phone]    What form or medical record are you requesting: MOST RECENT OFFICE NOTES    Who is requesting this form or medical record from you: PATIENT AID    How would you like to receive the form or medical records (pick-up, mail, fax): FAX  If fax, what is the fax number: [phone]    Timeframe paperwork needed: THE NEXT 24 HOURS    Additional notes: NEEDED TO WALKER

## 2023-12-15 ENCOUNTER — READMISSION MANAGEMENT (OUTPATIENT)
Dept: CALL CENTER | Facility: HOSPITAL | Age: 88
End: 2023-12-15
Payer: MEDICARE

## 2023-12-15 NOTE — OUTREACH NOTE
Medical Week 3 Survey      Flowsheet Row Responses   [facility] patient discharged from? [facility]   Does the patient have one of the following disease processes/diagnoses(primary or secondary)? Other   Week 3 attempt successful? Yes   Call start time 1607   Revoke Decline to participate  [dtr reports pt is doing well.]   Call end time 1607   Discharge diagnosis GI bleed   Person spoke with today (if not patient) and relationship Nikki-dtr   Call end time 1607            Mary MONTANEZ - Registered Nurse

## 2023-12-16 DIAGNOSIS — F32.9 REACTIVE DEPRESSION: ICD-10-CM

## 2023-12-27 ENCOUNTER — OFFICE VISIT (OUTPATIENT)
Dept: INTERNAL MEDICINE | Facility: CLINIC | Age: 88
End: 2023-12-27
Payer: MEDICARE

## 2023-12-27 ENCOUNTER — TELEPHONE (OUTPATIENT)
Dept: INTERNAL MEDICINE | Facility: CLINIC | Age: 88
End: 2023-12-27

## 2023-12-27 VITALS
DIASTOLIC BLOOD PRESSURE: 86 MMHG | BODY MASS INDEX: 33 KG/M2 | WEIGHT: 264 LBS | SYSTOLIC BLOOD PRESSURE: 134 MMHG | TEMPERATURE: 97.8 F | HEART RATE: 92 BPM | RESPIRATION RATE: 18 BRPM

## 2023-12-27 DIAGNOSIS — R41.3 MEMORY LOSS: Primary | ICD-10-CM

## 2023-12-27 DIAGNOSIS — R60.0 LOCALIZED EDEMA: ICD-10-CM

## 2023-12-27 PROCEDURE — 36415 COLL VENOUS BLD VENIPUNCTURE: CPT | Performed by: INTERNAL MEDICINE

## 2023-12-27 PROCEDURE — 80048 BASIC METABOLIC PNL TOTAL CA: CPT | Performed by: INTERNAL MEDICINE

## 2023-12-27 PROCEDURE — 99214 OFFICE O/P EST MOD 30 MIN: CPT | Performed by: INTERNAL MEDICINE

## 2023-12-27 RX ORDER — MEMANTINE HYDROCHLORIDE 10 MG/1
10 TABLET ORAL 2 TIMES DAILY
Qty: 60 TABLET | Refills: 5 | Status: SHIPPED | OUTPATIENT
Start: 2023-12-27

## 2023-12-27 NOTE — PROGRESS NOTES
Chief Complaint   Patient presents with    Follow-up     1 month follow up chronic medical problems       History of Present Illness      The patient presents for a follow-up related to memory loss. His symptoms are reported to be improved. He is taking a medication. The medication is taken as prescribed. There are no medication side effects. The patient is able to stay alone.    The patient presents for follow-up of edema of both legs. The edema is not painful. He denies excessive sodium intake. The patient denies fevers, chills, sweats or abdominal pain. The edema has improved. The patient is not wearing support hose. He is not elevating his legs.    Review of Systems    PULMONARY- Denies Wheezing, Dyspnea, Sputum Production, Cough, Hemoptysis or Pleuritic Chest Pain.    CARDIOVASCULAR- Reports: Edema. Denies: Chest Pain, Claudication, Syncope or Palpitations.    NEUROLOGIC- Denies Seizures, Headaches, Visual Changes, Confusion, Memory Loss, Depression or Excessive Daytime Sleepiness.    Medications      Current Outpatient Medications:     albuterol sulfate  (90 Base) MCG/ACT inhaler, Inhale 2 puffs Every 4 (Four) Hours As Needed for Wheezing., Disp: 18 g, Rfl: 5    allopurinol (ZYLOPRIM) 300 MG tablet, Take 1 tablet by mouth once daily, Disp: 90 tablet, Rfl: 0    apixaban (Eliquis) 5 MG tablet tablet, Take 1 tablet by mouth Every 12 (Twelve) Hours., Disp: 180 tablet, Rfl: 3    ascorbic acid (VITAMIN C) 1000 MG tablet, Take 1 tablet by mouth 2 (Two) Times a Day., Disp: , Rfl:     aspirin 81 MG EC tablet, Take 1 tablet by mouth Daily. 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., Disp: 90 tablet, Rfl: 1    Coenzyme Q10 300 MG capsule, Take 1 capsule by mouth Every Night., Disp: , Rfl:     docusate sodium (COLACE) 100 MG capsule, Take 2 capsules by mouth At Night As Needed for Constipation., Disp: , Rfl:     donepezil (Aricept) 10 MG tablet, Take 1 tablet by mouth Every Night., Disp: 30 tablet, Rfl: 2     Ferrous Gluconate (IRON) 240 (27 FE) MG tablet, Take 1 tablet by mouth Daily., Disp: , Rfl:     finasteride (PROSCAR) 5 MG tablet, TAKE 1 TABLET BY MOUTH EVERY DAY AT BEDTIME (Patient taking differently: Take 1 tablet by mouth Every Night.), Disp: 90 tablet, Rfl: 0    furosemide (Lasix) 20 MG tablet, Take 1 tablet by mouth 2 (Two) Times a Day., Disp: 180 tablet, Rfl: 2    memantine (Namenda Titration Delano) 28 x 5 MG & 21 x 10 MG tablet pack, Follow package directions., Disp: 49 tablet, Rfl: 0    metFORMIN (GLUCOPHAGE) 1000 MG tablet, Take 1 tablet by mouth twice daily (Patient taking differently: Take 1 tablet by mouth 2 (Two) Times a Day With Meals.), Disp: 180 tablet, Rfl: 1    methenamine (HIPREX) 1 g tablet, Take 1 tablet by mouth 2 (Two) Times a Day With Meals., Disp: , Rfl:     metOLazone (ZAROXOLYN) 2.5 MG tablet, Take 1 tablet by mouth Daily., Disp: 30 tablet, Rfl: 2    metoprolol tartrate (LOPRESSOR) 100 MG tablet, Take 1 tablet by mouth 2 (Two) Times a Day., Disp: 180 tablet, Rfl: 3    multivitamin with minerals (MULTIVITAMIN MEN 50+ PO), Take 1 tablet by mouth Every Night. Centrum Sliver, Disp: , Rfl:     omeprazole (priLOSEC) 20 MG capsule, Take 1 capsule by mouth once daily, Disp: 90 capsule, Rfl: 1    potassium chloride (K-DUR,KLOR-CON) 20 MEQ CR tablet, Take 1 tablet by mouth Daily., Disp: 90 tablet, Rfl: 1    pravastatin (PRAVACHOL) 40 MG tablet, Take 1 tablet by mouth once daily (Patient taking differently: Take 1 tablet by mouth Every Night.), Disp: 90 tablet, Rfl: 0    Probiotic Product (PROBIOTIC ADVANCED PO), Take 1 tablet by mouth 2 (Two) Times a Day., Disp: , Rfl:     sertraline (ZOLOFT) 50 MG tablet, Take 1 tablet by mouth once daily, Disp: 90 tablet, Rfl: 0    tamsulosin (FLOMAX) 0.4 MG capsule 24 hr capsule, Take 1 capsule by mouth once daily, Disp: 90 capsule, Rfl: 1    tiotropium bromide-olodaterol (STIOLTO RESPIMAT) 2.5-2.5 MCG/ACT aerosol solution inhaler, Inhale 2 puffs Daily. 2 inh  once a day, Disp: 1 each, Rfl: 3    valsartan (DIOVAN) 80 MG tablet, Take 1 tablet by mouth Daily., Disp: 90 tablet, Rfl: 1     Allergies    Allergies   Allergen Reactions    Xarelto [Rivaroxaban] GI Bleeding     GI bleed    Penicillins Hives     Has tolerated cefepime, ceftriaxone       Problem List    Patient Active Problem List   Diagnosis    Atopic rhinitis    Benign (familial) paraproteinemia    Type 2 diabetes mellitus with stage 3 chronic kidney disease, without long-term current use of insulin    Enlarged prostate without lower urinary tract symptoms (luts)    Gastroesophageal reflux disease with esophagitis    Polyp of gallbladder    Gout    Hyperlipidemia LDL goal <100    Essential hypertension    Nephrolithiasis    Obstructive sleep apnea syndrome    Paroxysmal atrial fibrillation    Stage 3 chronic kidney disease    Long term current use of antiarrhythmic medical therapy    Hydronephrosis    Coronary artery disease involving native coronary artery of native heart with angina pectoris    Malignant neoplasm of lower lobe of left lung    Chronic bilateral low back pain without sciatica    Other microscopic hematuria    H/O: GI bleed    Presence of Watchman left atrial appendage closure device    AF (paroxysmal atrial fibrillation)       Medications, Allergies, Problems List and Past History were reviewed and updated.    Physical Examination    /86 (BP Location: Left arm, Patient Position: Sitting, Cuff Size: Adult)   Pulse 92   Temp 97.8 °F (36.6 °C) (Infrared)   Resp 18   Wt 120 kg (264 lb)   BMI 33.00 kg/m²       HEENT: Facies- Within normal limits. Lids- Normal bilaterally. Conjunctiva- Clear bilaterally. Sclera- Anicteric bilaterally.    Lungs: Auscultation- Clear to auscultation bilaterally. There are no retractions, clubbing or cyanosis. The Expiratory to Inspiratory ratio is equal.    Cardiovascular: There are no carotid bruits. Heart- Normal Rate with Regular rhythm and no murmurs. There  are no gallops. There are no rubs. In the lower extremities there is bilateral 1+ pitting edema to the level of the mid calfs. The upper extremities do not have edema.    Impression and Assessment    Memory Loss.    Edema.    Plan    Edema Plan: The patient was instructed to continue the current medications. Further plans will be made after testing.    Memory Loss Plan: The patient was instructed to continue the current medications.    Diagnoses and all orders for this visit:    1. Memory loss (Primary)  -     memantine (NAMENDA) 10 MG tablet; Take 1 tablet by mouth 2 (Two) Times a Day.  Dispense: 60 tablet; Refill: 5    2. Localized edema  -     Basic Metabolic Panel; Future  -     Basic Metabolic Panel        Return to Office    The patient was instructed to return for follow-up in 6 weeks. The patient was instructed to return sooner if the condition changes, worsens, or does not resolve.

## 2023-12-28 DIAGNOSIS — R26.81 GAIT INSTABILITY: ICD-10-CM

## 2023-12-28 DIAGNOSIS — R41.3 MEMORY LOSS: Primary | ICD-10-CM

## 2023-12-28 LAB
ANION GAP SERPL CALCULATED.3IONS-SCNC: 12 MMOL/L (ref 5–15)
BUN SERPL-MCNC: 32 MG/DL (ref 8–23)
BUN/CREAT SERPL: 24.6 (ref 7–25)
CALCIUM SPEC-SCNC: 10.1 MG/DL (ref 8.6–10.5)
CHLORIDE SERPL-SCNC: 97 MMOL/L (ref 98–107)
CO2 SERPL-SCNC: 33 MMOL/L (ref 22–29)
CREAT SERPL-MCNC: 1.3 MG/DL (ref 0.76–1.27)
EGFRCR SERPLBLD CKD-EPI 2021: 53.2 ML/MIN/1.73
GLUCOSE SERPL-MCNC: 94 MG/DL (ref 65–99)
POTASSIUM SERPL-SCNC: 4.1 MMOL/L (ref 3.5–5.2)
SODIUM SERPL-SCNC: 142 MMOL/L (ref 136–145)

## 2024-01-03 ENCOUNTER — APPOINTMENT (OUTPATIENT)
Dept: CT IMAGING | Facility: HOSPITAL | Age: 89
DRG: 683 | End: 2024-01-03
Payer: MEDICARE

## 2024-01-03 ENCOUNTER — OFFICE VISIT (OUTPATIENT)
Dept: INTERNAL MEDICINE | Facility: CLINIC | Age: 89
End: 2024-01-03
Payer: MEDICARE

## 2024-01-03 ENCOUNTER — HOSPITAL ENCOUNTER (INPATIENT)
Facility: HOSPITAL | Age: 89
LOS: 1 days | Discharge: HOME | DRG: 683 | End: 2024-01-05
Attending: EMERGENCY MEDICINE | Admitting: INTERNAL MEDICINE
Payer: MEDICARE

## 2024-01-03 ENCOUNTER — APPOINTMENT (OUTPATIENT)
Dept: GENERAL RADIOLOGY | Facility: HOSPITAL | Age: 89
DRG: 683 | End: 2024-01-03
Payer: MEDICARE

## 2024-01-03 VITALS
HEART RATE: 68 BPM | DIASTOLIC BLOOD PRESSURE: 48 MMHG | WEIGHT: 257 LBS | SYSTOLIC BLOOD PRESSURE: 80 MMHG | BODY MASS INDEX: 32.12 KG/M2 | RESPIRATION RATE: 12 BRPM | TEMPERATURE: 97.5 F

## 2024-01-03 DIAGNOSIS — R60.0 LOCALIZED EDEMA: ICD-10-CM

## 2024-01-03 DIAGNOSIS — A41.9 SEVERE SEPSIS: Primary | ICD-10-CM

## 2024-01-03 DIAGNOSIS — R65.20 SEVERE SEPSIS: Primary | ICD-10-CM

## 2024-01-03 DIAGNOSIS — N28.9 RENAL INSUFFICIENCY: ICD-10-CM

## 2024-01-03 DIAGNOSIS — N39.0 ACUTE UTI: ICD-10-CM

## 2024-01-03 DIAGNOSIS — R42 DIZZINESS: Primary | ICD-10-CM

## 2024-01-03 DIAGNOSIS — I95.9 HYPOTENSION, UNSPECIFIED HYPOTENSION TYPE: ICD-10-CM

## 2024-01-03 DIAGNOSIS — I10 ESSENTIAL HYPERTENSION: ICD-10-CM

## 2024-01-03 PROBLEM — N17.9 ACUTE KIDNEY INJURY SUPERIMPOSED ON CHRONIC KIDNEY DISEASE: Status: ACTIVE | Noted: 2024-01-03

## 2024-01-03 PROBLEM — N18.9 ACUTE KIDNEY INJURY SUPERIMPOSED ON CHRONIC KIDNEY DISEASE: Status: ACTIVE | Noted: 2024-01-03

## 2024-01-03 LAB
ALBUMIN SERPL-MCNC: 3.9 G/DL (ref 3.5–5.2)
ALBUMIN/GLOB SERPL: 1 G/DL
ALP SERPL-CCNC: 129 U/L (ref 39–117)
ALT SERPL W P-5'-P-CCNC: 21 U/L (ref 1–41)
ANION GAP SERPL CALCULATED.3IONS-SCNC: 13 MMOL/L (ref 5–15)
AST SERPL-CCNC: 38 U/L (ref 1–40)
BACTERIA UR QL AUTO: ABNORMAL /HPF
BASOPHILS # BLD AUTO: 0.07 10*3/MM3 (ref 0–0.2)
BASOPHILS NFR BLD AUTO: 0.9 % (ref 0–1.5)
BILIRUB SERPL-MCNC: 1.4 MG/DL (ref 0–1.2)
BILIRUB UR QL STRIP: NEGATIVE
BUN SERPL-MCNC: 50 MG/DL (ref 8–23)
BUN/CREAT SERPL: 34.2 (ref 7–25)
CALCIUM SPEC-SCNC: 9.7 MG/DL (ref 8.6–10.5)
CHLORIDE SERPL-SCNC: 96 MMOL/L (ref 98–107)
CLARITY UR: CLEAR
CO2 SERPL-SCNC: 31 MMOL/L (ref 22–29)
COLOR UR: YELLOW
CREAT SERPL-MCNC: 1.46 MG/DL (ref 0.76–1.27)
D-LACTATE SERPL-SCNC: 1.6 MMOL/L (ref 0.5–2)
D-LACTATE SERPL-SCNC: 2.5 MMOL/L (ref 0.5–2)
DEPRECATED RDW RBC AUTO: 54 FL (ref 37–54)
EGFRCR SERPLBLD CKD-EPI 2021: 46.3 ML/MIN/1.73
EOSINOPHIL # BLD AUTO: 0.3 10*3/MM3 (ref 0–0.4)
EOSINOPHIL NFR BLD AUTO: 3.8 % (ref 0.3–6.2)
ERYTHROCYTE [DISTWIDTH] IN BLOOD BY AUTOMATED COUNT: 14.6 % (ref 12.3–15.4)
GLOBULIN UR ELPH-MCNC: 3.8 GM/DL
GLUCOSE BLDC GLUCOMTR-MCNC: 121 MG/DL (ref 70–130)
GLUCOSE SERPL-MCNC: 128 MG/DL (ref 65–99)
GLUCOSE UR STRIP-MCNC: NEGATIVE MG/DL
HCT VFR BLD AUTO: 48.5 % (ref 37.5–51)
HGB BLD-MCNC: 15.7 G/DL (ref 13–17.7)
HGB UR QL STRIP.AUTO: NEGATIVE
HOLD SPECIMEN: NORMAL
HYALINE CASTS UR QL AUTO: ABNORMAL /LPF
IMM GRANULOCYTES # BLD AUTO: 0.05 10*3/MM3 (ref 0–0.05)
IMM GRANULOCYTES NFR BLD AUTO: 0.6 % (ref 0–0.5)
INR PPP: 1.56 (ref 0.89–1.12)
KETONES UR QL STRIP: NEGATIVE
LEUKOCYTE ESTERASE UR QL STRIP.AUTO: ABNORMAL
LYMPHOCYTES # BLD AUTO: 1.16 10*3/MM3 (ref 0.7–3.1)
LYMPHOCYTES NFR BLD AUTO: 14.8 % (ref 19.6–45.3)
MAGNESIUM SERPL-MCNC: 1.7 MG/DL (ref 1.6–2.4)
MCH RBC QN AUTO: 32.2 PG (ref 26.6–33)
MCHC RBC AUTO-ENTMCNC: 32.4 G/DL (ref 31.5–35.7)
MCV RBC AUTO: 99.6 FL (ref 79–97)
MONOCYTES # BLD AUTO: 0.59 10*3/MM3 (ref 0.1–0.9)
MONOCYTES NFR BLD AUTO: 7.5 % (ref 5–12)
NEUTROPHILS NFR BLD AUTO: 5.65 10*3/MM3 (ref 1.7–7)
NEUTROPHILS NFR BLD AUTO: 72.4 % (ref 42.7–76)
NITRITE UR QL STRIP: POSITIVE
NRBC BLD AUTO-RTO: 0 /100 WBC (ref 0–0.2)
NT-PROBNP SERPL-MCNC: 1291 PG/ML (ref 0–1800)
PH UR STRIP.AUTO: <=5 [PH] (ref 5–8)
PLATELET # BLD AUTO: 157 10*3/MM3 (ref 140–450)
PMV BLD AUTO: 10.6 FL (ref 6–12)
POTASSIUM SERPL-SCNC: 4.3 MMOL/L (ref 3.5–5.2)
PROCALCITONIN SERPL-MCNC: <0.02 NG/ML (ref 0–0.25)
PROT SERPL-MCNC: 7.7 G/DL (ref 6–8.5)
PROT UR QL STRIP: NEGATIVE
PROTHROMBIN TIME: 18.8 SECONDS (ref 12.2–14.5)
QT INTERVAL: 340 MS
QTC INTERVAL: 431 MS
RBC # BLD AUTO: 4.87 10*6/MM3 (ref 4.14–5.8)
RBC # UR STRIP: ABNORMAL /HPF
REF LAB TEST METHOD: ABNORMAL
SODIUM SERPL-SCNC: 140 MMOL/L (ref 136–145)
SP GR UR STRIP: 1.01 (ref 1–1.03)
SQUAMOUS #/AREA URNS HPF: ABNORMAL /HPF
T4 FREE SERPL-MCNC: 1.05 NG/DL (ref 0.93–1.7)
TROPONIN T SERPL HS-MCNC: 39 NG/L
TSH SERPL DL<=0.05 MIU/L-ACNC: 5.2 UIU/ML (ref 0.27–4.2)
UROBILINOGEN UR QL STRIP: ABNORMAL
WBC # UR STRIP: ABNORMAL /HPF
WBC NRBC COR # BLD AUTO: 7.82 10*3/MM3 (ref 3.4–10.8)
WHOLE BLOOD HOLD COAG: NORMAL
WHOLE BLOOD HOLD SPECIMEN: NORMAL

## 2024-01-03 PROCEDURE — 83880 ASSAY OF NATRIURETIC PEPTIDE: CPT | Performed by: EMERGENCY MEDICINE

## 2024-01-03 PROCEDURE — G0378 HOSPITAL OBSERVATION PER HR: HCPCS

## 2024-01-03 PROCEDURE — 81001 URINALYSIS AUTO W/SCOPE: CPT | Performed by: EMERGENCY MEDICINE

## 2024-01-03 PROCEDURE — 99222 1ST HOSP IP/OBS MODERATE 55: CPT | Performed by: NURSE PRACTITIONER

## 2024-01-03 PROCEDURE — 71045 X-RAY EXAM CHEST 1 VIEW: CPT

## 2024-01-03 PROCEDURE — 25810000003 SODIUM CHLORIDE 0.9 % SOLUTION: Performed by: EMERGENCY MEDICINE

## 2024-01-03 PROCEDURE — 25810000003 LACTATED RINGERS SOLUTION: Performed by: EMERGENCY MEDICINE

## 2024-01-03 PROCEDURE — 80053 COMPREHEN METABOLIC PANEL: CPT | Performed by: EMERGENCY MEDICINE

## 2024-01-03 PROCEDURE — 83605 ASSAY OF LACTIC ACID: CPT | Performed by: EMERGENCY MEDICINE

## 2024-01-03 PROCEDURE — 93005 ELECTROCARDIOGRAM TRACING: CPT | Performed by: EMERGENCY MEDICINE

## 2024-01-03 PROCEDURE — 25010000002 ERTAPENEM PER 500 MG: Performed by: EMERGENCY MEDICINE

## 2024-01-03 PROCEDURE — 84145 PROCALCITONIN (PCT): CPT | Performed by: EMERGENCY MEDICINE

## 2024-01-03 PROCEDURE — 99291 CRITICAL CARE FIRST HOUR: CPT

## 2024-01-03 PROCEDURE — 85025 COMPLETE CBC W/AUTO DIFF WBC: CPT | Performed by: EMERGENCY MEDICINE

## 2024-01-03 PROCEDURE — 82948 REAGENT STRIP/BLOOD GLUCOSE: CPT

## 2024-01-03 PROCEDURE — 87040 BLOOD CULTURE FOR BACTERIA: CPT | Performed by: EMERGENCY MEDICINE

## 2024-01-03 PROCEDURE — 83735 ASSAY OF MAGNESIUM: CPT | Performed by: EMERGENCY MEDICINE

## 2024-01-03 PROCEDURE — 36415 COLL VENOUS BLD VENIPUNCTURE: CPT

## 2024-01-03 PROCEDURE — 87186 SC STD MICRODIL/AGAR DIL: CPT | Performed by: EMERGENCY MEDICINE

## 2024-01-03 PROCEDURE — 25010000002 MEROPENEM PER 100 MG: Performed by: INTERNAL MEDICINE

## 2024-01-03 PROCEDURE — 84439 ASSAY OF FREE THYROXINE: CPT | Performed by: EMERGENCY MEDICINE

## 2024-01-03 PROCEDURE — 25810000003 LACTATED RINGERS PER 1000 ML: Performed by: INTERNAL MEDICINE

## 2024-01-03 PROCEDURE — 84484 ASSAY OF TROPONIN QUANT: CPT | Performed by: EMERGENCY MEDICINE

## 2024-01-03 PROCEDURE — 85610 PROTHROMBIN TIME: CPT | Performed by: EMERGENCY MEDICINE

## 2024-01-03 PROCEDURE — 84443 ASSAY THYROID STIM HORMONE: CPT | Performed by: EMERGENCY MEDICINE

## 2024-01-03 PROCEDURE — 87077 CULTURE AEROBIC IDENTIFY: CPT | Performed by: EMERGENCY MEDICINE

## 2024-01-03 PROCEDURE — 87086 URINE CULTURE/COLONY COUNT: CPT | Performed by: EMERGENCY MEDICINE

## 2024-01-03 PROCEDURE — 25510000001 IOPAMIDOL PER 1 ML: Performed by: EMERGENCY MEDICINE

## 2024-01-03 PROCEDURE — 71275 CT ANGIOGRAPHY CHEST: CPT

## 2024-01-03 RX ORDER — IBUPROFEN 600 MG/1
1 TABLET ORAL
Status: DISCONTINUED | OUTPATIENT
Start: 2024-01-03 | End: 2024-01-05 | Stop reason: HOSPADM

## 2024-01-03 RX ORDER — SODIUM CHLORIDE 0.9 % (FLUSH) 0.9 %
10 SYRINGE (ML) INJECTION AS NEEDED
Status: DISCONTINUED | OUTPATIENT
Start: 2024-01-03 | End: 2024-01-05 | Stop reason: HOSPADM

## 2024-01-03 RX ORDER — L.ACID,PARA/B.BIFIDUM/S.THERM 8B CELL
1 CAPSULE ORAL DAILY
Status: DISCONTINUED | OUTPATIENT
Start: 2024-01-04 | End: 2024-01-05 | Stop reason: HOSPADM

## 2024-01-03 RX ORDER — ARFORMOTEROL TARTRATE 15 UG/2ML
15 SOLUTION RESPIRATORY (INHALATION)
Status: DISCONTINUED | OUTPATIENT
Start: 2024-01-03 | End: 2024-01-05 | Stop reason: HOSPADM

## 2024-01-03 RX ORDER — ACETAMINOPHEN 650 MG/1
650 SUPPOSITORY RECTAL EVERY 4 HOURS PRN
Status: DISCONTINUED | OUTPATIENT
Start: 2024-01-03 | End: 2024-01-05 | Stop reason: HOSPADM

## 2024-01-03 RX ORDER — POLYETHYLENE GLYCOL 3350 17 G/17G
17 POWDER, FOR SOLUTION ORAL DAILY PRN
Status: DISCONTINUED | OUTPATIENT
Start: 2024-01-03 | End: 2024-01-05 | Stop reason: HOSPADM

## 2024-01-03 RX ORDER — PRAVASTATIN SODIUM 40 MG
40 TABLET ORAL NIGHTLY
Status: DISCONTINUED | OUTPATIENT
Start: 2024-01-03 | End: 2024-01-05 | Stop reason: HOSPADM

## 2024-01-03 RX ORDER — AMOXICILLIN 250 MG
2 CAPSULE ORAL 2 TIMES DAILY
Status: DISCONTINUED | OUTPATIENT
Start: 2024-01-03 | End: 2024-01-05 | Stop reason: HOSPADM

## 2024-01-03 RX ORDER — FINASTERIDE 5 MG/1
5 TABLET, FILM COATED ORAL NIGHTLY
Status: DISCONTINUED | OUTPATIENT
Start: 2024-01-03 | End: 2024-01-05 | Stop reason: HOSPADM

## 2024-01-03 RX ORDER — SODIUM CHLORIDE 9 MG/ML
40 INJECTION, SOLUTION INTRAVENOUS AS NEEDED
Status: DISCONTINUED | OUTPATIENT
Start: 2024-01-03 | End: 2024-01-05 | Stop reason: HOSPADM

## 2024-01-03 RX ORDER — BISACODYL 10 MG
10 SUPPOSITORY, RECTAL RECTAL DAILY PRN
Status: DISCONTINUED | OUTPATIENT
Start: 2024-01-03 | End: 2024-01-05 | Stop reason: HOSPADM

## 2024-01-03 RX ORDER — ONDANSETRON 2 MG/ML
4 INJECTION INTRAMUSCULAR; INTRAVENOUS EVERY 6 HOURS PRN
Status: DISCONTINUED | OUTPATIENT
Start: 2024-01-03 | End: 2024-01-05 | Stop reason: HOSPADM

## 2024-01-03 RX ORDER — BISACODYL 5 MG/1
5 TABLET, DELAYED RELEASE ORAL DAILY PRN
Status: DISCONTINUED | OUTPATIENT
Start: 2024-01-03 | End: 2024-01-05 | Stop reason: HOSPADM

## 2024-01-03 RX ORDER — DEXTROSE MONOHYDRATE 25 G/50ML
25 INJECTION, SOLUTION INTRAVENOUS
Status: DISCONTINUED | OUTPATIENT
Start: 2024-01-03 | End: 2024-01-05 | Stop reason: HOSPADM

## 2024-01-03 RX ORDER — ACETAMINOPHEN 325 MG/1
650 TABLET ORAL EVERY 4 HOURS PRN
Status: DISCONTINUED | OUTPATIENT
Start: 2024-01-03 | End: 2024-01-05 | Stop reason: HOSPADM

## 2024-01-03 RX ORDER — SODIUM CHLORIDE 0.9 % (FLUSH) 0.9 %
10 SYRINGE (ML) INJECTION EVERY 12 HOURS SCHEDULED
Status: DISCONTINUED | OUTPATIENT
Start: 2024-01-03 | End: 2024-01-05 | Stop reason: HOSPADM

## 2024-01-03 RX ORDER — PANTOPRAZOLE SODIUM 40 MG/1
40 TABLET, DELAYED RELEASE ORAL
Status: DISCONTINUED | OUTPATIENT
Start: 2024-01-04 | End: 2024-01-05 | Stop reason: HOSPADM

## 2024-01-03 RX ORDER — SODIUM CHLORIDE 9 MG/ML
2000 INJECTION, SOLUTION INTRAVENOUS ONCE
Status: COMPLETED | OUTPATIENT
Start: 2024-01-03 | End: 2024-01-03

## 2024-01-03 RX ORDER — DONEPEZIL HYDROCHLORIDE 5 MG/1
10 TABLET, FILM COATED ORAL NIGHTLY
Status: DISCONTINUED | OUTPATIENT
Start: 2024-01-03 | End: 2024-01-05 | Stop reason: HOSPADM

## 2024-01-03 RX ORDER — ALLOPURINOL 300 MG/1
300 TABLET ORAL DAILY
Status: DISCONTINUED | OUTPATIENT
Start: 2024-01-04 | End: 2024-01-05 | Stop reason: HOSPADM

## 2024-01-03 RX ORDER — MULTIPLE VITAMINS W/ MINERALS TAB 9MG-400MCG
1 TAB ORAL NIGHTLY
Status: DISCONTINUED | OUTPATIENT
Start: 2024-01-03 | End: 2024-01-05 | Stop reason: HOSPADM

## 2024-01-03 RX ORDER — MEMANTINE HYDROCHLORIDE 10 MG/1
10 TABLET ORAL 2 TIMES DAILY
Status: DISCONTINUED | OUTPATIENT
Start: 2024-01-03 | End: 2024-01-05 | Stop reason: HOSPADM

## 2024-01-03 RX ORDER — NICOTINE POLACRILEX 4 MG
15 LOZENGE BUCCAL
Status: DISCONTINUED | OUTPATIENT
Start: 2024-01-03 | End: 2024-01-05 | Stop reason: HOSPADM

## 2024-01-03 RX ORDER — INSULIN LISPRO 100 [IU]/ML
2-7 INJECTION, SOLUTION INTRAVENOUS; SUBCUTANEOUS
Status: DISCONTINUED | OUTPATIENT
Start: 2024-01-03 | End: 2024-01-05 | Stop reason: HOSPADM

## 2024-01-03 RX ORDER — SODIUM CHLORIDE, SODIUM LACTATE, POTASSIUM CHLORIDE, CALCIUM CHLORIDE 600; 310; 30; 20 MG/100ML; MG/100ML; MG/100ML; MG/100ML
100 INJECTION, SOLUTION INTRAVENOUS CONTINUOUS
Status: DISCONTINUED | OUTPATIENT
Start: 2024-01-03 | End: 2024-01-05 | Stop reason: HOSPADM

## 2024-01-03 RX ORDER — IPRATROPIUM BROMIDE AND ALBUTEROL SULFATE 2.5; .5 MG/3ML; MG/3ML
3 SOLUTION RESPIRATORY (INHALATION) EVERY 6 HOURS PRN
Status: DISCONTINUED | OUTPATIENT
Start: 2024-01-03 | End: 2024-01-05 | Stop reason: HOSPADM

## 2024-01-03 RX ORDER — ACETAMINOPHEN 160 MG/5ML
650 SOLUTION ORAL EVERY 4 HOURS PRN
Status: DISCONTINUED | OUTPATIENT
Start: 2024-01-03 | End: 2024-01-05 | Stop reason: HOSPADM

## 2024-01-03 RX ORDER — TAMSULOSIN HYDROCHLORIDE 0.4 MG/1
0.4 CAPSULE ORAL DAILY
Status: DISCONTINUED | OUTPATIENT
Start: 2024-01-04 | End: 2024-01-05 | Stop reason: HOSPADM

## 2024-01-03 RX ORDER — ASPIRIN 81 MG/1
81 TABLET ORAL DAILY
Status: DISCONTINUED | OUTPATIENT
Start: 2024-01-04 | End: 2024-01-05 | Stop reason: HOSPADM

## 2024-01-03 RX ADMIN — MEROPENEM 1000 MG: 1 INJECTION, POWDER, FOR SOLUTION INTRAVENOUS at 22:51

## 2024-01-03 RX ADMIN — SODIUM CHLORIDE, POTASSIUM CHLORIDE, SODIUM LACTATE AND CALCIUM CHLORIDE 1000 ML: 600; 310; 30; 20 INJECTION, SOLUTION INTRAVENOUS at 15:03

## 2024-01-03 RX ADMIN — ERTAPENEM 1000 MG: 1 INJECTION INTRAMUSCULAR; INTRAVENOUS at 16:42

## 2024-01-03 RX ADMIN — SODIUM CHLORIDE, POTASSIUM CHLORIDE, SODIUM LACTATE AND CALCIUM CHLORIDE 100 ML/HR: 600; 310; 30; 20 INJECTION, SOLUTION INTRAVENOUS at 18:04

## 2024-01-03 RX ADMIN — FINASTERIDE 5 MG: 5 TABLET, FILM COATED ORAL at 20:28

## 2024-01-03 RX ADMIN — APIXABAN 5 MG: 5 TABLET, FILM COATED ORAL at 20:28

## 2024-01-03 RX ADMIN — IOPAMIDOL 80 ML: 755 INJECTION, SOLUTION INTRAVENOUS at 16:41

## 2024-01-03 RX ADMIN — SODIUM CHLORIDE 2000 ML: 9 INJECTION, SOLUTION INTRAVENOUS at 15:45

## 2024-01-03 RX ADMIN — PRAVASTATIN SODIUM 40 MG: 40 TABLET ORAL at 20:29

## 2024-01-03 RX ADMIN — MEMANTINE 10 MG: 10 TABLET ORAL at 20:28

## 2024-01-03 RX ADMIN — DONEPEZIL HYDROCHLORIDE 10 MG: 5 TABLET, FILM COATED ORAL at 20:28

## 2024-01-03 NOTE — ED NOTES
Darien Camarena    Nursing Report ED to Floor:  Mental status: A & O x 4  Ambulatory status: standby  Oxygen Therapy:  RA  Cardiac Rhythm: A fib  Admitted from: ED  Safety Concerns:  N/A  Social Issues: N/A  ED Room #:  28    ED Nurse Phone Extension - [phone] or may call [phone].      HPI:   Chief Complaint   Patient presents with    Hypotension       Past Medical History:  Past Medical History:   Diagnosis Date    Arrhythmia     Atrial fibrillation     Bilateral leg cramps     possible new medication related side effects    Chronic kidney disease     Clotting disorder     pt doesnt know about this    COPD (chronic obstructive pulmonary disease)     Coronary artery disease     Diabetes mellitus     doesnt check sugar    E. coli sepsis 06/23/2022    Enlarged prostate without lower urinary tract symptoms (luts) 06/20/2016    Full dentures     GERD (gastroesophageal reflux disease)     Gout     Hearing aid worn     bilat prn    History of colonoscopy 09/12/2012    History of radiation therapy 02/24/2023    SBRT LLL lung    [place] (hard of hearing)     hearing aids prn    Hyperlipidemia     Hypertension     Kidney stone     surgery x1    Lung cancer     STEVEN on CPAP     compliant with machine    PAF (paroxysmal atrial fibrillation)     Prostatism     Sleep apnea     CPAP HS    Urinary incontinence     Urinary tract infection     Wears glasses     readers        Past Surgical History:  Past Surgical History:   Procedure Laterality Date    ATRIAL APPENDAGE EXCLUSION LEFT WITH TRANSESOPHAGEAL ECHOCARDIOGRAM N/A 11/28/2023    Procedure: Atrial Appendage Occlusion;  Surgeon: Anthony Mcdaniel MD;  Location:  [facility];  Service: Cardiovascular;  Laterality: N/A;    CARDIAC CATHETERIZATION Left 09/29/2022    Procedure: Left Heart Cath;  Surgeon: Titus Oliveros IV, MD;  Location:  [facility];  Service: Cardiovascular;  Laterality: Left;    CARDIOVERSION      CATARACT EXTRACTION Bilateral      "COLONOSCOPY      CYSTOSCOPY      ENDOSCOPY      possible    KIDNEY STONE SURGERY      x1        Admitting Doctor:   Alma Rosa Garcia MD    Consulting Provider(s):  Consults       No orders found from 12/5/2023 to 1/4/2024.             Admitting Diagnosis:   There were no encounter diagnoses.    Most Recent Vitals:   Vitals:    01/03/24 1335 01/03/24 1414 01/03/24 1432   BP: 123/92 107/84 114/82   BP Location: Left arm  Right arm   Patient Position: Sitting  Sitting   Pulse: 97 93 89   Resp: 12     Temp: 97.7 °F (36.5 °C)     TempSrc: Oral     SpO2: 92% 93% 93%   Weight: 117 kg (257 lb)     Height: 190.5 cm (75\")         Active LDAs/IV Access:   Lines, Drains & Airways       Active LDAs       Name Placement date Placement time Site Days    Peripheral IV 01/03/24 1333 Anterior;Right Forearm 01/03/24  1333  Forearm  less than 1    Peripheral IV 01/03/24 1344 Anterior;Right Forearm 01/03/24  1344  Forearm  less than 1                    Labs (abnormal labs have a star):   Labs Reviewed   COMPREHENSIVE METABOLIC PANEL - Abnormal; Notable for the following components:       Result Value    Glucose 128 (*)     BUN 50 (*)     Creatinine 1.46 (*)     Chloride 96 (*)     CO2 31.0 (*)     Alkaline Phosphatase 129 (*)     Total Bilirubin 1.4 (*)     BUN/Creatinine Ratio 34.2 (*)     eGFR 46.3 (*)     All other components within normal limits    Narrative:     GFR Normal >60  Chronic Kidney Disease <60  Kidney Failure <15    The GFR formula is only valid for adults with stable renal function between ages 18 and 70.   SINGLE HSTROPONIN T - Abnormal; Notable for the following components:    HS Troponin T 39 (*)     All other components within normal limits    Narrative:     High Sensitive Troponin T Reference Range:  <14.0 ng/L- Negative Female for AMI  <22.0 ng/L- Negative Male for AMI  >=14 - Abnormal Female indicating possible myocardial injury.  >=22 - Abnormal Male indicating possible myocardial injury.   Clinicians would " have to utilize clinical acumen, EKG, Troponin, and serial changes to determine if it is an Acute Myocardial Infarction or myocardial injury due to an underlying chronic condition.        TSH - Abnormal; Notable for the following components:    TSH 5.200 (*)     All other components within normal limits    Narrative:     Due to abnormal TSH results, suggest ordering Free T4.   CBC WITH AUTO DIFFERENTIAL - Abnormal; Notable for the following components:    MCV 99.6 (*)     Lymphocyte % 14.8 (*)     Immature Grans % 0.6 (*)     All other components within normal limits   LACTIC ACID, PLASMA - Abnormal; Notable for the following components:    Lactate 2.5 (*)     All other components within normal limits   URINALYSIS W/ CULTURE IF INDICATED - Abnormal; Notable for the following components:    Leuk Esterase, UA Moderate (2+) (*)     Nitrite, UA Positive (*)     All other components within normal limits    Narrative:     In absence of clinical symptoms, the presence of pyuria, bacteria, and/or nitrites on the urinalysis result does not correlate with infection.   URINALYSIS, MICROSCOPIC ONLY - Abnormal; Notable for the following components:    WBC, UA 21-50 (*)     Bacteria, UA 4+ (*)     All other components within normal limits   MAGNESIUM - Normal   BNP (IN-HOUSE) - Normal    Narrative:     This assay is used as an aid in the diagnosis of individuals suspected of having heart failure. It can be used as an aid in the diagnosis of acute decompensated heart failure (ADHF) in patients presenting with signs and symptoms of ADHF to the emergency department (ED). In addition, NT-proBNP of <300 pg/mL indicates ADHF is not likely.    Age Range Result Interpretation  NT-proBNP Concentration (pg/mL:      <50             Positive            >450                   Gray                 300-450                    Negative             <300    50-75           Positive            >900                  Gray                300-900         "          Negative            <300      >75             Positive            >1800                  Gray                300-1800                  Negative            <300   T4, FREE - Normal    Narrative:     Results may be falsely increased if patient taking Biotin.     PROCALCITONIN - Normal    Narrative:     As a Marker for Sepsis (Non-Neonates):    1. <0.5 ng/mL represents a low risk of severe sepsis and/or septic shock.  2. >2 ng/mL represents a high risk of severe sepsis and/or septic shock.    As a Marker for Lower Respiratory Tract Infections that require antibiotic therapy:    PCT on Admission    Antibiotic Therapy       6-12 Hrs later    >0.5                Strongly Recommended  >0.25 - <0.5        Recommended   0.1 - 0.25          Discouraged              Remeasure/reassess PCT  <0.1                Strongly Discouraged     Remeasure/reassess PCT    As 28 day mortality risk marker: \"Change in Procalcitonin Result\" (>80% or <=80%) if Day 0 (or Day 1) and Day 4 values are available. Refer to [email or web address]    Change in PCT <=80%  A decrease of PCT levels below or equal to 80% defines a positive change in PCT test result representing a higher risk for 28-day all-cause mortality of patients diagnosed with severe sepsis for septic shock.    Change in PCT >80%  A decrease of PCT levels of more than 80% defines a negative change in PCT result representing a lower risk for 28-day all-cause mortality of patients diagnosed with severe sepsis or septic shock.      BLOOD CULTURE   BLOOD CULTURE   URINE CULTURE   RAINBOW DRAW    Narrative:     The following orders were created for panel order [place] Draw.  Procedure                               Abnormality         Status                     ---------                               -----------         ------                     Green Top (Gel)[[id number]]                                  Final result               Lavender Top[[id number]]             "                         Final result               Gold Top - [facility][[id number]]                                   Final result               Martin Top[[id number]]                                         In process                 Light Blue Top[[id number]]                                   Final result                 Please view results for these tests on the individual orders.   PROTIME-INR   LACTIC ACID, REFLEX   POCT OCCULT BLOOD STOOL   CBC AND DIFFERENTIAL    Narrative:     The following orders were created for panel order CBC & Differential.  Procedure                               Abnormality         Status                     ---------                               -----------         ------                     CBC Auto Differential[[id number]]        Abnormal            Final result                 Please view results for these tests on the individual orders.   GREEN TOP   LAVENDER TOP   GOLD TOP - SST   LIGHT BLUE TOP   GRAY TOP       Meds Given in ED:   Medications   sodium chloride 0.9 % flush 10 mL (has no administration in time range)   sodium chloride 0.9 % flush 10 mL (has no administration in time range)   ertapenem (INVanz) 1,000 mg in sodium chloride 0.9 % 100 mL IVPB (has no administration in time range)   lactated ringers infusion (has no administration in time range)   lactated ringers bolus 1,000 mL (1,000 mL Intravenous New Bag 1/3/24 1503)   sodium chloride 0.9 % infusion 2,000 mL (2,000 mL Intravenous New Bag 1/3/24 1545)     lactated ringers, 100 mL/hr

## 2024-01-03 NOTE — Clinical Note
Level of Care: Telemetry [5]   Diagnosis: UTI (urinary tract infection) [171500]   Admitting Physician: KIRSTEN BOBBY [[id number]]   Attending Physician: KIRSTEN BOBBY [[id number]]

## 2024-01-03 NOTE — ED PROVIDER NOTES
Subjective   History of Present Illness  88-year-old male sent to the emergency department via EMS from his physicians office to be evaluated for hypotension.  Of note, the patient had a recent Watchman procedure last month by Dr. Mcdaniel of cardiology.  He endorses compliance with his medications, most notably his DOAC.  He tells me that over the past few days he has not felt well.  He self caths and has a history of recurrent UTIs.  He denies any fevers.  This morning, he checked his blood pressure at home and noted himself to be hypotensive.  As a result, he went and saw his primary care physician where he was noted to be hypotensive in the office with a blood pressure of 80/48.  EMS was then called and he was sent to our facility for further evaluation and treatment.  He was given IV crystalloid on the way to our facility which improved his blood pressure.      Review of Systems   Neurological:  Positive for weakness.   All other systems reviewed and are negative.      Past Medical History:   Diagnosis Date    Arrhythmia     Atrial fibrillation     Bilateral leg cramps     possible new medication related side effects    Chronic kidney disease     Clotting disorder     pt doesnt know about this    COPD (chronic obstructive pulmonary disease)     Coronary artery disease     Diabetes mellitus     doesnt check sugar    E. coli sepsis 06/23/2022    Enlarged prostate without lower urinary tract symptoms (luts) 06/20/2016    Full dentures     GERD (gastroesophageal reflux disease)     Gout     Hearing aid worn     bilat prn    History of colonoscopy 09/12/2012    History of radiation therapy 02/24/2023    SBRT LLL lung    [place] (hard of hearing)     hearing aids prn    Hyperlipidemia     Hypertension     Kidney stone     surgery x1    Lung cancer     STEVEN on CPAP     compliant with machine    PAF (paroxysmal atrial fibrillation)     Prostatism     Sleep apnea     CPAP HS    Urinary incontinence     Urinary tract infection      Wears glasses     readers       Allergies   Allergen Reactions    Xarelto [Rivaroxaban] GI Bleeding     GI bleed    Penicillins Hives     Has tolerated cefepime, ceftriaxone       Past Surgical History:   Procedure Laterality Date    ATRIAL APPENDAGE EXCLUSION LEFT WITH TRANSESOPHAGEAL ECHOCARDIOGRAM N/A 2023    Procedure: Atrial Appendage Occlusion;  Surgeon: Anthony Mcdaniel MD;  Location:  [facility];  Service: Cardiovascular;  Laterality: N/A;    CARDIAC CATHETERIZATION Left 2022    Procedure: Left Heart Cath;  Surgeon: Titus Oliveros IV, MD;  Location:  [facility];  Service: Cardiovascular;  Laterality: Left;    CARDIOVERSION      CATARACT EXTRACTION Bilateral     COLONOSCOPY      CYSTOSCOPY      ENDOSCOPY      possible    KIDNEY STONE SURGERY      x1       Family History   Problem Relation Age of Onset    Alzheimer's disease Mother     COPD Father     Lung cancer Father     Cancer Father     Kidney disease Father     No Known Problems Daughter     No Known Problems Daughter     No Known Problems Daughter        Social History     Socioeconomic History    Marital status:    Tobacco Use    Smoking status: Former     Packs/day: 1.00     Years: 15.00     Additional pack years: 0.00     Total pack years: 15.00     Types: Cigarettes     Start date: 1947     Quit date: 1960     Years since quittin.6     Passive exposure: Past    Smokeless tobacco: Former     Types: Chew     Quit date:     Tobacco comments:     started at 13 yo.   Vaping Use    Vaping Use: Never used    Passive vaping exposure: Yes   Substance and Sexual Activity    Alcohol use: No    Drug use: Never    Sexual activity: Not Currently     Partners: Female           Objective   Physical Exam  Vitals and nursing note reviewed.   Constitutional:       General: He is not in acute distress.     Appearance: He is well-developed. He is not diaphoretic.   HENT:      Head:  Normocephalic and atraumatic.   Neck:      Vascular: No JVD.   Cardiovascular:      Rate and Rhythm: Normal rate and regular rhythm.      Heart sounds: Normal heart sounds. No murmur heard.     No friction rub. No gallop.   Pulmonary:      Effort: Pulmonary effort is normal. No respiratory distress.      Breath sounds: Normal breath sounds. No wheezing or rales.   Abdominal:      General: Bowel sounds are normal. There is no distension.      Palpations: Abdomen is soft. There is no mass.      Tenderness: There is no abdominal tenderness. There is no guarding.   Musculoskeletal:         General: Normal range of motion.      Cervical back: Normal range of motion.   Skin:     General: Skin is warm and dry.      Coloration: Skin is not pale.      Findings: No erythema or rash.   Neurological:      Mental Status: He is alert and oriented to person, place, and time.   Psychiatric:         Thought Content: Thought content normal.         Judgment: Judgment normal.         Critical Care    Performed by: Renny Hendrickson MD  Authorized by: Renny Hendrickson MD    Critical care provider statement:     Critical care time (minutes):  35    Critical care was necessary to treat or prevent imminent or life-threatening deterioration of the following conditions:  Sepsis and shock    Critical care was time spent personally by me on the following activities:  Development of treatment plan with patient or surrogate, discussions with consultants, evaluation of patient's response to treatment, examination of patient, obtaining history from patient or surrogate, ordering and performing treatments and interventions, ordering and review of laboratory studies, ordering and review of radiographic studies, pulse oximetry, re-evaluation of patient's condition and review of old charts             ED Course  ED Course as of 01/03/24 1821 Wed Jan 03, 2024   1417 88-year-old male sent to the emergency department via EMS from his physicians  office to be evaluated for hypotension.  Of note, the patient had a recent Watchman procedure last month by Dr. Mcdaniel of cardiology.  He endorses compliance with his medications, most notably his DOAC.  He tells me that over the past few days he has not felt well.  This morning, he checked his blood pressure at home and was noted to be hypotensive.  As a result, he went and saw his primary care physician where he was noted to be hypotensive in the office with a blood pressure of 80/48.  EMS was called and he was sent here for further evaluation and treatment.  On arrival, the patient is nontoxic-appearing.  He currently has no complaints.  Nonsurgical abdomen.  Differential diagnosis is quite broad.  We will obtain labs and imaging, and we will reassess following initial interventions. [DD]   1419 EKG interpreted independently by me revealed atrial fibrillation with a heart rate of 97 and no ST segments suggestive of or concerning for ischemia.  The patient denies any bloody or tarry stools. [DD]   1456 I personally and independently viewed the patient's x-ray images myself, and I am in agreement with the radiologist's reading for final interpretation--particularly, there is no pulmonary edema noted. [DD]   1456 Hemoglobin is normal at 15.  The patient has mild renal insufficiency when compared to baseline.  Initial troponin is mildly elevated. [DD]   1530 Urinalysis is suggestive of infection.  Blood and urine cultures obtained.  Meropenem given for UTI given history of ESBL. [DD]   1530 30 cc/kg crystalloid bolus given for severe sepsis given the patient's hypotension. [DD]   1531 I discussed the patient's case with our hospitalist, Dr. Garcia, and the patient will be admitted under her care for further evaluation and treatment.  The patient is aware/agreeable with the plan at this time. [DD]   1614 Chest CTA is pending at the time of admission and will be followed up by our inpatient team.  It should not change the  patient's ultimate disposition. [DD]      ED Course User Index  [DD] Renny Hendrickson MD                                        Recent Results (from the past 24 hour(s))   Comprehensive Metabolic Panel    Collection Time: 01/03/24  1:43 PM    Specimen: Blood   Result Value Ref Range    Glucose 128 (H) 65 - 99 mg/dL    BUN 50 (H) 8 - 23 mg/dL    Creatinine 1.46 (H) 0.76 - 1.27 mg/dL    Sodium 140 136 - 145 mmol/L    Potassium 4.3 3.5 - 5.2 mmol/L    Chloride 96 (L) 98 - 107 mmol/L    CO2 31.0 (H) 22.0 - 29.0 mmol/L    Calcium 9.7 8.6 - 10.5 mg/dL    Total Protein 7.7 6.0 - 8.5 g/dL    Albumin 3.9 3.5 - 5.2 g/dL    ALT (SGPT) 21 1 - 41 U/L    AST (SGOT) 38 1 - 40 U/L    Alkaline Phosphatase 129 (H) 39 - 117 U/L    Total Bilirubin 1.4 (H) 0.0 - 1.2 mg/dL    Globulin 3.8 gm/dL    A/G Ratio 1.0 g/dL    BUN/Creatinine Ratio 34.2 (H) 7.0 - 25.0    Anion Gap 13.0 5.0 - 15.0 mmol/L    eGFR 46.3 (L) >60.0 mL/min/1.73   Magnesium    Collection Time: 01/03/24  1:43 PM    Specimen: Blood   Result Value Ref Range    Magnesium 1.7 1.6 - 2.4 mg/dL   Single High Sensitivity Troponin T    Collection Time: 01/03/24  1:43 PM    Specimen: Blood   Result Value Ref Range    HS Troponin T 39 (H) <22 ng/L   TSH    Collection Time: 01/03/24  1:43 PM    Specimen: Blood   Result Value Ref Range    TSH 5.200 (H) 0.270 - 4.200 uIU/mL   BNP    Collection Time: 01/03/24  1:43 PM    Specimen: Blood   Result Value Ref Range    proBNP 1,291.0 0.0 - 1,800.0 pg/mL   Green Top (Gel)    Collection Time: 01/03/24  1:43 PM   Result Value Ref Range    Extra Tube Hold for add-ons.    Lavender Top    Collection Time: 01/03/24  1:43 PM   Result Value Ref Range    Extra Tube hold for add-on    Gold Top - SST    Collection Time: 01/03/24  1:43 PM   Result Value Ref Range    Extra Tube Hold for add-ons.    Gray Top    Collection Time: 01/03/24  1:43 PM   Result Value Ref Range    Extra Tube Hold for add-ons.    Light Blue Top    Collection Time: 01/03/24   1:43 PM   Result Value Ref Range    Extra Tube Hold for add-ons.    CBC Auto Differential    Collection Time: 01/03/24  1:43 PM    Specimen: Blood   Result Value Ref Range    WBC 7.82 3.40 - 10.80 10*3/mm3    RBC 4.87 4.14 - 5.80 10*6/mm3    Hemoglobin 15.7 13.0 - 17.7 g/dL    Hematocrit 48.5 37.5 - 51.0 %    MCV 99.6 (H) 79.0 - 97.0 fL    MCH 32.2 26.6 - 33.0 pg    MCHC 32.4 31.5 - 35.7 g/dL    RDW 14.6 12.3 - 15.4 %    RDW-SD 54.0 37.0 - 54.0 fl    MPV 10.6 6.0 - 12.0 fL    Platelets 157 140 - 450 10*3/mm3    Neutrophil % 72.4 42.7 - 76.0 %    Lymphocyte % 14.8 (L) 19.6 - 45.3 %    Monocyte % 7.5 5.0 - 12.0 %    Eosinophil % 3.8 0.3 - 6.2 %    Basophil % 0.9 0.0 - 1.5 %    Immature Grans % 0.6 (H) 0.0 - 0.5 %    Neutrophils, Absolute 5.65 1.70 - 7.00 10*3/mm3    Lymphocytes, Absolute 1.16 0.70 - 3.10 10*3/mm3    Monocytes, Absolute 0.59 0.10 - 0.90 10*3/mm3    Eosinophils, Absolute 0.30 0.00 - 0.40 10*3/mm3    Basophils, Absolute 0.07 0.00 - 0.20 10*3/mm3    Immature Grans, Absolute 0.05 0.00 - 0.05 10*3/mm3    nRBC 0.0 0.0 - 0.2 /100 WBC   T4, Free    Collection Time: 01/03/24  1:43 PM    Specimen: Blood   Result Value Ref Range    Free T4 1.05 0.93 - 1.70 ng/dL   Lactic Acid, Plasma    Collection Time: 01/03/24  1:43 PM    Specimen: Blood   Result Value Ref Range    Lactate 2.5 (C) 0.5 - 2.0 mmol/L   Procalcitonin    Collection Time: 01/03/24  1:43 PM    Specimen: Blood   Result Value Ref Range    Procalcitonin <0.02 0.00 - 0.25 ng/mL   Protime-INR    Collection Time: 01/03/24  1:43 PM    Specimen: Blood   Result Value Ref Range    Protime 18.8 (H) 12.2 - 14.5 Seconds    INR 1.56 (H) 0.89 - 1.12   ECG 12 Lead ED Triage Standing Order; Dysrhythmia    Collection Time: 01/03/24  1:49 PM   Result Value Ref Range    QT Interval 340 ms    QTC Interval 431 ms   Urinalysis With Culture If Indicated - Urine, Clean Catch    Collection Time: 01/03/24  2:41 PM    Specimen: Urine, Clean Catch   Result Value Ref Range     Color, UA Yellow Yellow, Straw    Appearance, UA Clear Clear    pH, UA <=5.0 5.0 - 8.0    Specific Gravity, UA 1.012 1.001 - 1.030    Glucose, UA Negative Negative    Ketones, UA Negative Negative    Bilirubin, UA Negative Negative    Blood, UA Negative Negative    Protein, UA Negative Negative    Leuk Esterase, UA Moderate (2+) (A) Negative    Nitrite, UA Positive (A) Negative    Urobilinogen, UA 0.2 E.U./dL 0.2 - 1.0 E.U./dL   Urinalysis, Microscopic Only - Urine, Clean Catch    Collection Time: 01/03/24  2:41 PM    Specimen: Urine, Clean Catch   Result Value Ref Range    RBC, UA 0-2 None Seen, 0-2 /HPF    WBC, UA 21-50 (A) None Seen, 0-2 /HPF    Bacteria, UA 4+ (A) None Seen, Trace /HPF    Squamous Epithelial Cells, UA 0-2 None Seen, 0-2 /HPF    Hyaline Casts, UA 0-6 0 - 6 /LPF    Methodology Automated Microscopy    STAT Lactic Acid, Reflex    Collection Time: 01/03/24  5:10 PM    Specimen: Blood   Result Value Ref Range    Lactate 1.6 0.5 - 2.0 mmol/L     Note: In addition to lab results from this visit, the labs listed above may include labs taken at another facility or during a different encounter within the last 24 hours. Please correlate lab times with ED admission and discharge times for further clarification of the services performed during this visit.    CT Angiogram Chest   Final Result   Impression:   Negative exam for pulmonary embolism. Chronic infiltrate of the left lower lobe. No acute process.            Electronically Signed: Aster Mathur MD     1/3/2024 4:48 PM EST     Workstation ID: [id number]      XR Chest 1 View   Final Result   Impression:      1. Chronic elevation of the left hemidiaphragm with mild left basilar atelectasis.   2. No acute process in the chest         Electronically Signed: Anthony Solano MD     1/3/2024 2:25 PM EST     Workstation ID: [id number]        Vitals:    01/03/24 1335 01/03/24 1414 01/03/24 1432 01/03/24 1734   BP: 123/92 107/84 114/82 (!) 132/104   BP Location: Left  "arm  Right arm Right arm   Patient Position: Sitting  Sitting Sitting   Pulse: 97 93 89    Resp: 12   14   Temp: 97.7 °F (36.5 °C)   97.8 °F (36.6 °C)   TempSrc: Oral   Oral   SpO2: 92% 93% 93% 93%   Weight: 117 kg (257 lb)      Height: 190.5 cm (75\")        Medications   sodium chloride 0.9 % flush 10 mL (has no administration in time range)   sodium chloride 0.9 % flush 10 mL (has no administration in time range)   lactated ringers infusion (100 mL/hr Intravenous New Bag 1/3/24 2347)   allopurinol (ZYLOPRIM) tablet 300 mg (has no administration in time range)   apixaban (ELIQUIS) tablet 5 mg (has no administration in time range)   aspirin EC tablet 81 mg (has no administration in time range)   donepezil (ARICEPT) tablet 10 mg (has no administration in time range)   finasteride (PROSCAR) tablet 5 mg (has no administration in time range)   memantine (NAMENDA) tablet 10 mg (has no administration in time range)   multivitamin with minerals 1 tablet (has no administration in time range)   pantoprazole (PROTONIX) EC tablet 40 mg (has no administration in time range)   pravastatin (PRAVACHOL) tablet 40 mg (has no administration in time range)   lactobacillus acidophilus (RISAQUAD) capsule 1 capsule (has no administration in time range)   tamsulosin (FLOMAX) 24 hr capsule 0.4 mg (has no administration in time range)   sertraline (ZOLOFT) tablet 50 mg (has no administration in time range)   sodium chloride 0.9 % flush 10 mL (has no administration in time range)   sodium chloride 0.9 % flush 10 mL (has no administration in time range)   sodium chloride 0.9 % infusion 40 mL (has no administration in time range)   sennosides-docusate (PERICOLACE) 8.6-50 MG per tablet 2 tablet (has no administration in time range)     And   polyethylene glycol (MIRALAX) packet 17 g (has no administration in time range)     And   bisacodyl (DULCOLAX) EC tablet 5 mg (has no administration in time range)     And   bisacodyl (DULCOLAX) suppository " 10 mg (has no administration in time range)   dextrose (GLUTOSE) oral gel 15 g (has no administration in time range)   dextrose (D50W) (25 g/50 mL) IV injection 25 g (has no administration in time range)   glucagon (GLUCAGEN) injection 1 mg (has no administration in time range)   Insulin Lispro (humaLOG) injection 2-7 Units (has no administration in time range)   acetaminophen (TYLENOL) tablet 650 mg (has no administration in time range)     Or   acetaminophen (TYLENOL) 160 MG/5ML oral solution 650 mg (has no administration in time range)     Or   acetaminophen (TYLENOL) suppository 650 mg (has no administration in time range)   ondansetron (ZOFRAN) injection 4 mg (has no administration in time range)   ertapenem (INVanz) 1,000 mg in sodium chloride 0.9 % 100 mL IVPB (has no administration in time range)   ipratropium-albuterol (DUO-NEB) nebulizer solution 3 mL (has no administration in time range)   arformoterol (BROVANA) nebulizer solution 15 mcg (has no administration in time range)   ipratropium (ATROVENT) nebulizer solution 0.5 mg (has no administration in time range)   lactated ringers bolus 1,000 mL (0 mL Intravenous Stopped 1/3/24 1600)   sodium chloride 0.9 % infusion 2,000 mL (2,000 mL Intravenous New Bag 1/3/24 1545)   ertapenem (INVanz) 1,000 mg in sodium chloride 0.9 % 100 mL IVPB (1,000 mg Intravenous New Bag 1/3/24 1642)   iopamidol (ISOVUE-370) 76 % injection 100 mL (80 mL Intravenous Given 1/3/24 1641)     ECG/EMG Results (last 24 hours)       Procedure Component Value Units Date/Time    ECG 12 Lead ED Triage Standing Order; Dysrhythmia [[id number]] Collected: 01/03/24 1349     Updated: 01/03/24 1455     QT Interval 340 ms      QTC Interval 431 ms     Narrative:      Test Reason : ED Triage Standing Order~  Blood Pressure :   */*   mmHG  Vent. Rate :  97 BPM     Atrial Rate :  83 BPM     P-R Int :   * ms          QRS Dur :  96 ms      QT Int : 340 ms       P-R-T Axes :   *  54  84 degrees     QTc Int  : 431 ms    Atrial fibrillation  Abnormal ECG  When compared with ECG of 08-JUN-2023 10:21,  Atrial fibrillation has replaced Sinus rhythm  Vent. rate has increased BY  39 BPM  Confirmed by MD Hendrickson Michael ([id number]) on 1/3/2024 2:55:06 PM    Referred By: JUNIOR           Confirmed By: Pito Hendrickson MD          ECG 12 Lead ED Triage Standing Order; Dysrhythmia   Final Result   Test Reason : ED Triage Standing Order~   Blood Pressure :   */*   mmHG   Vent. Rate :  97 BPM     Atrial Rate :  83 BPM      P-R Int :   * ms          QRS Dur :  96 ms       QT Int : 340 ms       P-R-T Axes :   *  54  84 degrees      QTc Int : 431 ms      Atrial fibrillation   Abnormal ECG   When compared with ECG of 08-JUN-2023 10:21,   Atrial fibrillation has replaced Sinus rhythm   Vent. rate has increased BY  39 BPM   Confirmed by MD Hendrickson Michael ([id number]) on 1/3/2024 2:55:06 PM      Referred By: JUNIOR           Confirmed By: Pito Hendrickson MD                 Medical Decision Making  Amount and/or Complexity of Data Reviewed  Labs: ordered.  Radiology: ordered.  ECG/medicine tests: ordered.    Risk  Prescription drug management.  Decision regarding hospitalization.        Final diagnoses:   Severe sepsis   Acute UTI   Renal insufficiency       ED Disposition  ED Disposition       ED Disposition   Decision to Admit    Condition   --    Comment   Level of Care: Telemetry [5]   Diagnosis: UTI (urinary tract infection) [114909]   Admitting Physician: KIRSTEN BOBBY [[id number]]   Attending Physician: KIRSTEN BOBBY [[id number]]                 No follow-up provider specified.       Medication List      No changes were made to your prescriptions during this visit.            Renny Hendrickson MD  01/03/24 8471

## 2024-01-03 NOTE — H&P
[facility] Medicine Services  HISTORY AND PHYSICAL    Patient Name: Darien Camarena  : 1935  MRN: [id number]  Primary Care Physician: Pito Way MD  Date of admission: 1/3/2024      Subjective   Subjective     Chief Complaint:  Hypotension     HPI:  Darien Camarena is a 88 y.o. male with PMH of Afib, CKD, COPD, CAD, BPH, Gout, HLD, HTN, lung cancer, STEVEN, sleep apnea, UTI.GI bleed,  Patient states he has been feeling dizzy at times. But denies any falls or LOC. He has been taking his medication as directed at home. He went to see his PCP today and his bp was low so he was sent to the ED to be evaluated. He just had a watchman device implanted and follow up with cardiology on 24. He denies any pain, dysuria, freq, hematuria fever, chills, or N/V/D    In ED: troponin 39, creat 1.46, total bilirubin 1.4, TSH 5.200, lactate 2.5, procal <0.02, WBC 7.82      Personal History     Past Medical History:   Diagnosis Date    Arrhythmia     Atrial fibrillation     Bilateral leg cramps     possible new medication related side effects    Chronic kidney disease     Clotting disorder     pt doesnt know about this    COPD (chronic obstructive pulmonary disease)     Coronary artery disease     Diabetes mellitus     doesnt check sugar    E. coli sepsis 2022    Enlarged prostate without lower urinary tract symptoms (luts) 2016    Full dentures     GERD (gastroesophageal reflux disease)     Gout     Hearing aid worn     bilat prn    History of colonoscopy 2012    History of radiation therapy 2023    SBRT LLL lung    [place] (hard of hearing)     hearing aids prn    Hyperlipidemia     Hypertension     Kidney stone     surgery x1    Lung cancer     STEVEN on CPAP     compliant with machine    PAF (paroxysmal atrial fibrillation)     Prostatism     Sleep apnea     CPAP HS    Urinary incontinence     Urinary tract infection     Wears glasses     readers          Oncology Problem List:  Malignant neoplasm of lower lobe of left lung (01/30/2023; Status:   Active)    Oncology/Hematology History   Malignant neoplasm of lower lobe of left lung   1/30/2023 Initial Diagnosis    Malignant neoplasm of lower lobe of left lung (HCC)     2/14/2023 - 2/24/2023 Radiation    Radiation OncologyTreatment Course:  Darien Camarena received 5000 cGy in 5 fractions to left lung via External Beam Radiation - EBRT.         Past Surgical History:   Procedure Laterality Date    ATRIAL APPENDAGE EXCLUSION LEFT WITH TRANSESOPHAGEAL ECHOCARDIOGRAM N/A 11/28/2023    Procedure: Atrial Appendage Occlusion;  Surgeon: Anthony Mcdaniel MD;  Location:  [facility];  Service: Cardiovascular;  Laterality: N/A;    CARDIAC CATHETERIZATION Left 09/29/2022    Procedure: Left Heart Cath;  Surgeon: Titus Oliveros IV, MD;  Location:  [facility];  Service: Cardiovascular;  Laterality: Left;    CARDIOVERSION      CATARACT EXTRACTION Bilateral     COLONOSCOPY      CYSTOSCOPY      ENDOSCOPY      possible    KIDNEY STONE SURGERY      x1       Family History: family history includes Alzheimer's disease in his mother; COPD in his father; Cancer in his father; Kidney disease in his father; Lung cancer in his father; No Known Problems in his daughter, daughter, and daughter.     Social History:  reports that he quit smoking about 63 years ago. His smoking use included cigarettes. He started smoking about 77 years ago. He has a 15.00 pack-year smoking history. He has been exposed to tobacco smoke. He quit smokeless tobacco use about 13 years ago.  His smokeless tobacco use included chew. He reports that he does not drink alcohol and does not use drugs.  Social History     Social History Narrative    Caffeine: 1 cup of decaff coffee daily        Medications:  Available home medication information reviewed.  Medications Prior to Admission   Medication Sig Dispense Refill Last  Dose    albuterol sulfate  (90 Base) MCG/ACT inhaler Inhale 2 puffs Every 4 (Four) Hours As Needed for Wheezing. 18 g 5 1/3/2024    allopurinol (ZYLOPRIM) 300 MG tablet Take 1 tablet by mouth once daily 90 tablet 0 1/3/2024    apixaban (Eliquis) 5 MG tablet tablet Take 1 tablet by mouth Every 12 (Twelve) Hours. 180 tablet 3 1/3/2024    ascorbic acid (VITAMIN C) 1000 MG tablet Take 1 tablet by mouth 2 (Two) Times a Day.   1/3/2024    aspirin 81 MG EC tablet Take 1 tablet by mouth Daily. 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. 90 tablet 1 1/3/2024    Coenzyme Q10 300 MG capsule Take 1 capsule by mouth Every Night.   1/2/2024    docusate sodium (COLACE) 100 MG capsule Take 2 capsules by mouth At Night As Needed for Constipation.   1/2/2024    donepezil (Aricept) 10 MG tablet Take 1 tablet by mouth Every Night. 30 tablet 2 1/2/2024    Ferrous Gluconate (IRON) 240 (27 FE) MG tablet Take 1 tablet by mouth Daily.   1/3/2024    finasteride (PROSCAR) 5 MG tablet TAKE 1 TABLET BY MOUTH EVERY DAY AT BEDTIME (Patient taking differently: Take 1 tablet by mouth Every Night.) 90 tablet 0 1/2/2024    furosemide (Lasix) 20 MG tablet Take 1 tablet by mouth 2 (Two) Times a Day. 180 tablet 2 1/3/2024    memantine (NAMENDA) 10 MG tablet Take 1 tablet by mouth 2 (Two) Times a Day. 60 tablet 5 1/3/2024    metFORMIN (GLUCOPHAGE) 1000 MG tablet Take 1 tablet by mouth twice daily (Patient taking differently: Take 1 tablet by mouth 2 (Two) Times a Day With Meals.) 180 tablet 1 1/3/2024    methenamine (HIPREX) 1 g tablet Take 1 tablet by mouth 2 (Two) Times a Day With Meals.   1/3/2024    metOLazone (ZAROXOLYN) 2.5 MG tablet Take 1 tablet by mouth Daily. 30 tablet 2 1/3/2024    metoprolol tartrate (LOPRESSOR) 100 MG tablet Take 1 tablet by mouth 2 (Two) Times a Day. 180 tablet 3 1/3/2024    multivitamin with minerals (MULTIVITAMIN MEN 50+ PO) Take 1 tablet by mouth Every Night. Centrum Gloryiver   1/2/2024    omeprazole (priLOSEC) 20 MG  capsule Take 1 capsule by mouth once daily 90 capsule 1 1/3/2024    potassium chloride (K-DUR,KLOR-CON) 20 MEQ CR tablet Take 1 tablet by mouth Daily. 90 tablet 1 1/3/2024    pravastatin (PRAVACHOL) 40 MG tablet Take 1 tablet by mouth once daily (Patient taking differently: Take 1 tablet by mouth Every Night.) 90 tablet 0 1/2/2024    Probiotic Product (PROBIOTIC ADVANCED PO) Take 1 tablet by mouth 2 (Two) Times a Day.   1/3/2024    sertraline (ZOLOFT) 50 MG tablet Take 1 tablet by mouth once daily 90 tablet 0 1/3/2024    tamsulosin (FLOMAX) 0.4 MG capsule 24 hr capsule Take 1 capsule by mouth once daily 90 capsule 1 1/3/2024    tiotropium bromide-olodaterol (STIOLTO RESPIMAT) 2.5-2.5 MCG/ACT aerosol solution inhaler Inhale 2 puffs Daily. 2 inh once a day 1 each 3 1/3/2024    valsartan (DIOVAN) 80 MG tablet Take 1 tablet by mouth Daily. 90 tablet 1 1/3/2024       Allergies   Allergen Reactions    Xarelto [Rivaroxaban] GI Bleeding     GI bleed    Penicillins Hives     Has tolerated cefepime, ceftriaxone       Objective   Objective     Vital Signs:   Temp:  [97.5 °F (36.4 °C)-97.8 °F (36.6 °C)] 97.8 °F (36.6 °C)  Heart Rate:  [68-97] 89  Resp:  [12-14] 14  BP: ()/() 132/104       Physical Exam   Constitutional: Awake, alert  Eyes: PERRLA, sclerae anicteric, no conjunctival injection  HENT: NCAT, mucous membranes moist  Neck: Supple, no thyromegaly, no lymphadenopathy, trachea midline  Respiratory: Clear to auscultation bilaterally, nonlabored respirations  room air   Cardiovascular: RRR, no murmurs, rubs, or gallops, palpable pedal pulses bilaterally  Gastrointestinal: Positive bowel sounds, soft, nontender, nondistended  Musculoskeletal: No bilateral ankle edema, no clubbing or cyanosis to extremities  Psychiatric: Appropriate affect, cooperative  Neurologic: Oriented x 3, strength symmetric in all extremities, Cranial Nerves grossly intact to confrontation, speech clear  Skin: No rashes      Result  Review:  I have personally reviewed the results from the time of this admission to 1/3/2024 18:09 EST and agree with these findings:  [x]  Laboratory list / accordion  [x]  Microbiology  [x]  Radiology  []  EKG/Telemetry   []  Cardiology/Vascular   []  Pathology  [x]  Old records  []  Other:  Most notable findings include:         LAB RESULTS:      Lab 01/03/24  1710 01/03/24  1343   WBC  --  7.82   HEMOGLOBIN  --  15.7   HEMATOCRIT  --  48.5   PLATELETS  --  157   NEUTROS ABS  --  5.65   IMMATURE GRANS (ABS)  --  0.05   LYMPHS ABS  --  1.16   MONOS ABS  --  0.59   EOS ABS  --  0.30   MCV  --  99.6*   PROCALCITONIN  --  <0.02   LACTATE 1.6 2.5*   PROTIME  --  18.8*   INR  --  1.56*         Lab 01/03/24  1343   SODIUM 140   POTASSIUM 4.3   CHLORIDE 96*   CO2 31.0*   ANION GAP 13.0   BUN 50*   CREATININE 1.46*   EGFR 46.3*   GLUCOSE 128*   CALCIUM 9.7   MAGNESIUM 1.7   TSH 5.200*         Lab 01/03/24  1343   TOTAL PROTEIN 7.7   ALBUMIN 3.9   GLOBULIN 3.8   ALT (SGPT) 21   AST (SGOT) 38   BILIRUBIN 1.4*   ALK PHOS 129*         Lab 01/03/24  1343   PROBNP 1,291.0   HSTROP T 39*                 UA          10/30/2023    17:49 11/6/2023    12:11 1/3/2024    14:41   Urinalysis   Squamous Epithelial Cells, UA  7-12  0-2    Specific Gravity, UA  1.014  1.012    Ketones, UA Negative  Negative  Negative    Blood, UA  Trace  Negative    Leukocytes,  Germaine/ul  Trace  Moderate (2+)    Nitrite, UA  Negative  Positive    RBC, UA  3-5  0-2    WBC, UA  6-10  21-50    Bacteria, UA  None Seen  4+        Microbiology Results (last 10 days)       ** No results found for the last 240 hours. **            CT Angiogram Chest    Result Date: 1/3/2024  CT ANGIOGRAM CHEST Date of Exam: 1/3/2024 4:34 PM EST Indication: hypotension. Comparison: 11/17/2023. Technique: CTA of the chest was performed after the uneventful intravenous administration of 80 mL Isovue-370. Reconstructed coronal and sagittal images were also obtained. In addition, a  3-D volume rendered image was created for interpretation. Automated exposure control and iterative reconstruction methods were used. Findings: There are no filling defects suspicious for pulmonary embolism. There is a left atrial appendage occlusion device. There are no enlarged mediastinal nodes. There is no hilar adenopathy. There are no pleural effusions. There is slightly nodular appearing infiltrate of the left lower lobe. It was present previously. The right lung is clear.     Impression: Impression: Negative exam for pulmonary embolism. Chronic infiltrate of the left lower lobe. No acute process. Electronically Signed: Aster Mathur MD  1/3/2024 4:48 PM EST  Workstation ID: [id number]    XR Chest 1 View    Result Date: 1/3/2024  XR CHEST 1 VW Date of Exam: 1/3/2024 2:12 PM EST Indication: Dysrhythmia triage protocol Comparison: 12/22/2022 Findings: Heart size is within normal limits. The left hemidiaphragm is elevated with left basilar atelectasis. The lungs are otherwise clear. The pulmonary vascular markings appear normal.     Impression: Impression: 1. Chronic elevation of the left hemidiaphragm with mild left basilar atelectasis. 2. No acute process in the chest Electronically Signed: Anthony Solano MD  1/3/2024 2:25 PM EST  Workstation ID: [id number]     Results for orders placed during the hospital encounter of 11/28/23    Intra-Op Structural Heart FARHAD (Cardiology Read)    Interpretation Summary    Left ventricular systolic function is low normal. Estimated left ventricular EF = 50%    Following placement of a 27 mm Watchman FLX device, the device appears well-seated. No flow is seen around the device. Compression is appropriate.    No pericardial effusion is seen.      Assessment & Plan   Assessment & Plan     Active Hospital Problems    Diagnosis  POA    **UTI (urinary tract infection) [N39.0]  Yes    Acute kidney injury superimposed on chronic kidney disease [N17.9, N18.9]  Unknown    Paroxysmal atrial  fibrillation [I48.0]  Yes     Diagnosed August 2012.   FARHAD-guided ECV, 8/17/2012  CHADS-VASc 5 (age > 75, CAD, HTN, DM)  Successful external cardioversion for asymptomatic atrial fibrillation with RVR, 10/25/18  Successful cardioversion for atrial fibrillation RVR, 3/6/19.  Sotalol increased  Minimally symptomatic atrial fibrillation with cardioversion and the ER, 10/31/2019  ED cardioversion for A. fib with RVR, 7/21/2020  ED cardioversion for asymptomatic A. fib with RVR, 12/27/2020   ED cardioversion for asymptomatic A. fib with RVR x 2  occasions, March 2021  Transition from sotalol to amiodarone, 4/14/2021  Successful FARHAD/ECV May 3, 2021  Successful cardioversion, 8/5/2022  Echo 8/22 EF 50%, anatomically and functionally normal valves      Type 2 diabetes mellitus with stage 3 chronic kidney disease, without long-term current use of insulin [E11.22, N18.30]  Yes    Enlarged prostate without lower urinary tract symptoms (luts) [N40.0]  Yes    Gastroesophageal reflux disease with esophagitis [K21.00]  Yes    Gout [M10.9]  Yes    Hyperlipidemia LDL goal <100 [E78.5]  Yes     Moderate intensity statin therapy reasonable due to diabetic status      Essential hypertension [I10]  Yes     Target blood pressure <130/80 mmHg         Darien Camarena is a 88 y.o. male with PMH of Afib, CKD, COPD, CAD, BPH, Gout, HLD, HTN, lung cancer, STEVEN, sleep apnea, UTI.GI bleed with recent watchman device implanted.    UTI  Hx of ESBL   -- follows with DR Og for recurrent UTI  -- will hold hiprex and vit c for now  -- consult ID in am   -- In and out cath 4 x a day   -- cont Invanz started in ED   -- monitor blood and  urine culture  -- got fluid bolus in ed, cont IVF's    BPH  -- cont flomax and proscar   -- pt self caths at home 4 x a day     DM  -- hold home meds  -- LDSSI     COPD without exac  -- oxygen as needed  -- nebs prn   -- cont home inhalers     CAD  HTN   HLD  Hx of Afib s/p watchman device on 11/28/23  --- hold  antihypertensive meds for now  -- cont ASA, statin and eliquis   -- no evidence of PE    JENNIFER on CKD  -- follows with  nephrology  -- baseline creat 0.7-1  -- got 3L of IVF in ED   -- labs in am   -- hold lasix and zaroxolyn   -monitor for retention in an 89 yo man    Gout  -- cont allopurinol     GERD  -- cont PPI     DVT prophylaxis:  eliquis       CODE STATUS:  full  code   Code Status and Medical Interventions:   Ordered at: 01/03/24 1808     Level Of Support Discussed With:    Patient     Code Status (Patient has no pulse and is not breathing):    CPR (Attempt to Resuscitate)     Medical Interventions (Patient has pulse or is breathing):    Full Support       Expected Discharge   Expected Discharge Date: 1/3/2024; Expected Discharge Time:  3:00 PM     Chastity Verdin, APRN  01/03/24    Patient seen and examined, HE reports feeling much better.  BP stable, he is not toxic.  Cont as above.    Alma Rosa Garcia MD 01/04/24 00:26 EST

## 2024-01-03 NOTE — PROGRESS NOTES
Office Note     Name: Darien Camarena    : 1935     MRN: [id number]     Chief Complaint  Hypotension    Subjective     History of Present Illness:  Darien Camarena is a 88 y.o. male who presents today for evaluation of hypotension. He is accompanied by his daughter.    The patient reported a low blood pressured reading yesterday. He complained of dizziness and not feeling well on 2023. He denies any medication changes recently. Patient had an appointment with Dr. Way on 2023 for leg edema, no medications were changed at that time.     He had his Watchman procedure. He is scheduled on 2024 for maintenance for the Watchman. He has a follow-up appointment with cardiologist, Dr. Mcdaniel, on 2024.    Review of Systems:   Review of Systems    Past Medical History:   Past Medical History:   Diagnosis Date    Arrhythmia     Atrial fibrillation     Bilateral leg cramps     possible new medication related side effects    Chronic kidney disease     Clotting disorder     pt doesnt know about this    COPD (chronic obstructive pulmonary disease)     Coronary artery disease     Diabetes mellitus     doesnt check sugar    E. coli sepsis 2022    Enlarged prostate without lower urinary tract symptoms (luts) 2016    Full dentures     GERD (gastroesophageal reflux disease)     Gout     Hearing aid worn     bilat prn    History of colonoscopy 2012    History of radiation therapy 2023    SBRT LLL lung    [place] (hard of hearing)     hearing aids prn    Hyperlipidemia     Hypertension     Kidney stone     surgery x1    Lung cancer     STEVEN on CPAP     compliant with machine    PAF (paroxysmal atrial fibrillation)     Prostatism     Sleep apnea     CPAP HS    Urinary incontinence     Urinary tract infection     Wears glasses     readers       Past Surgical History:   Past Surgical History:   Procedure Laterality Date    ATRIAL APPENDAGE EXCLUSION LEFT WITH TRANSESOPHAGEAL  ECHOCARDIOGRAM N/A 11/28/2023    Procedure: Atrial Appendage Occlusion;  Surgeon: Anthony Mcdaniel MD;  Location:  [facility];  Service: Cardiovascular;  Laterality: N/A;    CARDIAC CATHETERIZATION Left 09/29/2022    Procedure: Left Heart Cath;  Surgeon: Titus Oliveros IV, MD;  Location:  [facility];  Service: Cardiovascular;  Laterality: Left;    CARDIOVERSION      CATARACT EXTRACTION Bilateral     COLONOSCOPY      CYSTOSCOPY      ENDOSCOPY      possible    KIDNEY STONE SURGERY      x1       Immunizations:   Immunization History   Administered Date(s) Administered    31-influenza Vac Quardvalent Preservativ 11/01/2018, 10/16/2019    COVID-19 (PFIZER) BIVALENT 12+YRS 12/22/2022    COVID-19 (PFIZER) Purple Cap Monovalent 01/26/2021, 02/19/2021    Fluzone High Dose =>65 Years ([employer] ONLY) 10/01/2016, 09/18/2017, 09/15/2018, 10/12/2019, 09/18/2020, 09/25/2021    Fluzone High-Dose 65+yrs 09/19/2020, 09/25/2021, 12/22/2022, 10/30/2023    Hepatitis A 09/15/2018, 11/01/2018    Pneumococcal Conjugate 13-Valent (PCV13) 10/26/2015    Pneumococcal Polysaccharide (PPSV23) 06/24/2006, 09/18/2020    Pneumococcal, Unspecified 11/30/2006    Shingrix 09/25/2021, 04/15/2023    Td (TDVAX) 06/24/2005    Tdap 06/14/2018        Medications:     Current Outpatient Medications:     albuterol sulfate  (90 Base) MCG/ACT inhaler, Inhale 2 puffs Every 4 (Four) Hours As Needed for Wheezing., Disp: 18 g, Rfl: 5    allopurinol (ZYLOPRIM) 300 MG tablet, Take 1 tablet by mouth once daily, Disp: 90 tablet, Rfl: 0    apixaban (Eliquis) 5 MG tablet tablet, Take 1 tablet by mouth Every 12 (Twelve) Hours., Disp: 180 tablet, Rfl: 3    ascorbic acid (VITAMIN C) 1000 MG tablet, Take 1 tablet by mouth 2 (Two) Times a Day., Disp: , Rfl:     aspirin 81 MG EC tablet, Take 1 tablet by mouth Daily. 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., Disp: 90 tablet, Rfl: 1    Coenzyme Q10 300 MG capsule, Take 1 capsule  by mouth Every Night., Disp: , Rfl:     docusate sodium (COLACE) 100 MG capsule, Take 2 capsules by mouth At Night As Needed for Constipation., Disp: , Rfl:     donepezil (Aricept) 10 MG tablet, Take 1 tablet by mouth Every Night., Disp: 30 tablet, Rfl: 2    Ferrous Gluconate (IRON) 240 (27 FE) MG tablet, Take 1 tablet by mouth Daily., Disp: , Rfl:     finasteride (PROSCAR) 5 MG tablet, TAKE 1 TABLET BY MOUTH EVERY DAY AT BEDTIME (Patient taking differently: Take 1 tablet by mouth Every Night.), Disp: 90 tablet, Rfl: 0    furosemide (Lasix) 20 MG tablet, Take 1 tablet by mouth 2 (Two) Times a Day., Disp: 180 tablet, Rfl: 2    memantine (NAMENDA) 10 MG tablet, Take 1 tablet by mouth 2 (Two) Times a Day., Disp: 60 tablet, Rfl: 5    metFORMIN (GLUCOPHAGE) 1000 MG tablet, Take 1 tablet by mouth twice daily (Patient taking differently: Take 1 tablet by mouth 2 (Two) Times a Day With Meals.), Disp: 180 tablet, Rfl: 1    methenamine (HIPREX) 1 g tablet, Take 1 tablet by mouth 2 (Two) Times a Day With Meals., Disp: , Rfl:     metOLazone (ZAROXOLYN) 2.5 MG tablet, Take 1 tablet by mouth Daily., Disp: 30 tablet, Rfl: 2    metoprolol tartrate (LOPRESSOR) 100 MG tablet, Take 1 tablet by mouth 2 (Two) Times a Day., Disp: 180 tablet, Rfl: 3    multivitamin with minerals (MULTIVITAMIN MEN 50+ PO), Take 1 tablet by mouth Every Night. Centrum Sliver, Disp: , Rfl:     omeprazole (priLOSEC) 20 MG capsule, Take 1 capsule by mouth once daily, Disp: 90 capsule, Rfl: 1    potassium chloride (K-DUR,KLOR-CON) 20 MEQ CR tablet, Take 1 tablet by mouth Daily., Disp: 90 tablet, Rfl: 1    pravastatin (PRAVACHOL) 40 MG tablet, Take 1 tablet by mouth once daily (Patient taking differently: Take 1 tablet by mouth Every Night.), Disp: 90 tablet, Rfl: 0    Probiotic Product (PROBIOTIC ADVANCED PO), Take 1 tablet by mouth 2 (Two) Times a Day., Disp: , Rfl:     sertraline (ZOLOFT) 50 MG tablet, Take 1 tablet by mouth once daily, Disp: 90 tablet, Rfl:  "0    tamsulosin (FLOMAX) 0.4 MG capsule 24 hr capsule, Take 1 capsule by mouth once daily, Disp: 90 capsule, Rfl: 1    tiotropium bromide-olodaterol (STIOLTO RESPIMAT) 2.5-2.5 MCG/ACT aerosol solution inhaler, Inhale 2 puffs Daily. 2 inh once a day, Disp: 1 each, Rfl: 3    valsartan (DIOVAN) 80 MG tablet, Take 1 tablet by mouth Daily., Disp: 90 tablet, Rfl: 1    Allergies:   Allergies   Allergen Reactions    Xarelto [Rivaroxaban] GI Bleeding     GI bleed    Penicillins Hives     Has tolerated cefepime, ceftriaxone       Family History:   Family History   Problem Relation Age of Onset    Alzheimer's disease Mother     COPD Father     Lung cancer Father     Cancer Father     Kidney disease Father     No Known Problems Daughter     No Known Problems Daughter     No Known Problems Daughter        Social History:   Social History     Socioeconomic History    Marital status:    Tobacco Use    Smoking status: Former     Packs/day: 1.00     Years: 15.00     Additional pack years: 0.00     Total pack years: 15.00     Types: Cigarettes     Start date: 1947     Quit date: 1960     Years since quittin.6     Passive exposure: Past    Smokeless tobacco: Former     Types: Chew     Quit date:     Tobacco comments:     started at 13 yo.   Vaping Use    Vaping Use: Never used    Passive vaping exposure: Yes   Substance and Sexual Activity    Alcohol use: No    Drug use: Never    Sexual activity: Not Currently     Partners: Female         Objective     Vital Signs  BP (!) 80/48 (BP Location: Right arm, Patient Position: Sitting, Cuff Size: Adult)   Pulse 68   Temp 97.5 °F (36.4 °C) (Infrared)   Resp 12   Wt 117 kg (257 lb)   BMI 32.12 kg/m²   Estimated body mass index is 32.12 kg/m² as calculated from the following:    Height as of 23: 190.5 cm (75\").    Weight as of this encounter: 117 kg (257 lb).            Physical Exam  Vitals and nursing note reviewed.   Constitutional:       Appearance: He is " not toxic-appearing.      Comments: Patient appears drowsy, patient is arousable and would answer questions   Cardiovascular:      Rate and Rhythm: Normal rate and regular rhythm.   Pulmonary:      Effort: Pulmonary effort is normal.      Breath sounds: Normal breath sounds.   Skin:     General: Skin is warm and dry.   Psychiatric:         Mood and Affect: Mood normal.            ECG 12 Lead    Date/Time: 1/12/2024 3:37 PM  Performed by: Bozena Oconnor PA-C    Authorized by: Bozena Oconnor PA-C  Comparison: compared with previous ECG   Rhythm: atrial fibrillation            Assessment and Plan     1. Dizziness  - ECG 12 Lead    2. Hypotension, unspecified hypotension type  - ECG 12 Lead      Patient was transferred by squad to [facility] ER, report called to [facility] ER and patient was stable upon leaving our clinic      Follow Up  No follow-ups on file.    DURAN [facility]ares [facility]  [address] [id number] [phone]    Transcribed from ambient dictation for Bozena Oconnor PA-C by Eleazar Spicer.  01/03/24   13:08 EST    Patient or patient representative verbalized consent to the visit recording.  I have personally performed the services described in this document as transcribed by the above individual, and it is both accurate and complete.

## 2024-01-04 LAB
ALBUMIN SERPL-MCNC: 3.5 G/DL (ref 3.5–5.2)
ALBUMIN/GLOB SERPL: 1.1 G/DL
ALP SERPL-CCNC: 115 U/L (ref 39–117)
ALT SERPL W P-5'-P-CCNC: 16 U/L (ref 1–41)
ANION GAP SERPL CALCULATED.3IONS-SCNC: 11 MMOL/L (ref 5–15)
AST SERPL-CCNC: 27 U/L (ref 1–40)
BILIRUB SERPL-MCNC: 1.2 MG/DL (ref 0–1.2)
BUN SERPL-MCNC: 36 MG/DL (ref 8–23)
BUN/CREAT SERPL: 32.7 (ref 7–25)
CALCIUM SPEC-SCNC: 9.1 MG/DL (ref 8.6–10.5)
CHLORIDE SERPL-SCNC: 98 MMOL/L (ref 98–107)
CO2 SERPL-SCNC: 29 MMOL/L (ref 22–29)
CREAT SERPL-MCNC: 1.1 MG/DL (ref 0.76–1.27)
DEPRECATED RDW RBC AUTO: 52.3 FL (ref 37–54)
EGFRCR SERPLBLD CKD-EPI 2021: 65 ML/MIN/1.73
ERYTHROCYTE [DISTWIDTH] IN BLOOD BY AUTOMATED COUNT: 14.5 % (ref 12.3–15.4)
GLOBULIN UR ELPH-MCNC: 3.2 GM/DL
GLUCOSE BLDC GLUCOMTR-MCNC: 117 MG/DL (ref 70–130)
GLUCOSE BLDC GLUCOMTR-MCNC: 134 MG/DL (ref 70–130)
GLUCOSE BLDC GLUCOMTR-MCNC: 139 MG/DL (ref 70–130)
GLUCOSE BLDC GLUCOMTR-MCNC: 208 MG/DL (ref 70–130)
GLUCOSE SERPL-MCNC: 109 MG/DL (ref 65–99)
HCT VFR BLD AUTO: 43.3 % (ref 37.5–51)
HGB BLD-MCNC: 14.3 G/DL (ref 13–17.7)
MCH RBC QN AUTO: 32.3 PG (ref 26.6–33)
MCHC RBC AUTO-ENTMCNC: 33 G/DL (ref 31.5–35.7)
MCV RBC AUTO: 97.7 FL (ref 79–97)
PLATELET # BLD AUTO: 141 10*3/MM3 (ref 140–450)
PMV BLD AUTO: 10.7 FL (ref 6–12)
POTASSIUM SERPL-SCNC: 3.6 MMOL/L (ref 3.5–5.2)
PROT SERPL-MCNC: 6.7 G/DL (ref 6–8.5)
RBC # BLD AUTO: 4.43 10*6/MM3 (ref 4.14–5.8)
SODIUM SERPL-SCNC: 138 MMOL/L (ref 136–145)
WBC NRBC COR # BLD AUTO: 6.73 10*3/MM3 (ref 3.4–10.8)

## 2024-01-04 PROCEDURE — 97116 GAIT TRAINING THERAPY: CPT

## 2024-01-04 PROCEDURE — 85027 COMPLETE CBC AUTOMATED: CPT | Performed by: NURSE PRACTITIONER

## 2024-01-04 PROCEDURE — 82948 REAGENT STRIP/BLOOD GLUCOSE: CPT

## 2024-01-04 PROCEDURE — 97162 PT EVAL MOD COMPLEX 30 MIN: CPT

## 2024-01-04 PROCEDURE — 63710000001 INSULIN LISPRO (HUMAN) PER 5 UNITS: Performed by: NURSE PRACTITIONER

## 2024-01-04 PROCEDURE — 25810000003 LACTATED RINGERS PER 1000 ML: Performed by: INTERNAL MEDICINE

## 2024-01-04 PROCEDURE — 94640 AIRWAY INHALATION TREATMENT: CPT

## 2024-01-04 PROCEDURE — 80053 COMPREHEN METABOLIC PANEL: CPT | Performed by: NURSE PRACTITIONER

## 2024-01-04 PROCEDURE — 94761 N-INVAS EAR/PLS OXIMETRY MLT: CPT

## 2024-01-04 PROCEDURE — 99233 SBSQ HOSP IP/OBS HIGH 50: CPT | Performed by: INTERNAL MEDICINE

## 2024-01-04 PROCEDURE — 94799 UNLISTED PULMONARY SVC/PX: CPT

## 2024-01-04 PROCEDURE — 25010000002 MEROPENEM PER 100 MG: Performed by: INTERNAL MEDICINE

## 2024-01-04 RX ORDER — METOPROLOL TARTRATE 100 MG/1
100 TABLET ORAL 2 TIMES DAILY
Status: DISCONTINUED | OUTPATIENT
Start: 2024-01-04 | End: 2024-01-05 | Stop reason: HOSPADM

## 2024-01-04 RX ORDER — METOPROLOL TARTRATE 100 MG/1
100 TABLET ORAL 2 TIMES DAILY
Status: DISCONTINUED | OUTPATIENT
Start: 2024-01-04 | End: 2024-01-04

## 2024-01-04 RX ADMIN — TAMSULOSIN HYDROCHLORIDE 0.4 MG: 0.4 CAPSULE ORAL at 08:18

## 2024-01-04 RX ADMIN — MEMANTINE 10 MG: 10 TABLET ORAL at 20:53

## 2024-01-04 RX ADMIN — PRAVASTATIN SODIUM 40 MG: 40 TABLET ORAL at 20:53

## 2024-01-04 RX ADMIN — IPRATROPIUM BROMIDE 0.5 MG: 0.5 SOLUTION RESPIRATORY (INHALATION) at 15:05

## 2024-01-04 RX ADMIN — INSULIN LISPRO 3 UNITS: 100 INJECTION, SOLUTION INTRAVENOUS; SUBCUTANEOUS at 11:13

## 2024-01-04 RX ADMIN — PANTOPRAZOLE SODIUM 40 MG: 40 TABLET, DELAYED RELEASE ORAL at 05:54

## 2024-01-04 RX ADMIN — IPRATROPIUM BROMIDE 0.5 MG: 0.5 SOLUTION RESPIRATORY (INHALATION) at 19:11

## 2024-01-04 RX ADMIN — SODIUM CHLORIDE, POTASSIUM CHLORIDE, SODIUM LACTATE AND CALCIUM CHLORIDE 100 ML/HR: 600; 310; 30; 20 INJECTION, SOLUTION INTRAVENOUS at 04:56

## 2024-01-04 RX ADMIN — Medication 10 ML: at 08:19

## 2024-01-04 RX ADMIN — MEROPENEM 1000 MG: 1 INJECTION, POWDER, FOR SOLUTION INTRAVENOUS at 05:55

## 2024-01-04 RX ADMIN — Medication 10 ML: at 20:54

## 2024-01-04 RX ADMIN — DONEPEZIL HYDROCHLORIDE 10 MG: 5 TABLET, FILM COATED ORAL at 20:53

## 2024-01-04 RX ADMIN — IPRATROPIUM BROMIDE 0.5 MG: 0.5 SOLUTION RESPIRATORY (INHALATION) at 06:31

## 2024-01-04 RX ADMIN — METOPROLOL TARTRATE 100 MG: 100 TABLET, FILM COATED ORAL at 20:55

## 2024-01-04 RX ADMIN — APIXABAN 5 MG: 5 TABLET, FILM COATED ORAL at 08:18

## 2024-01-04 RX ADMIN — METOPROLOL TARTRATE 100 MG: 100 TABLET, FILM COATED ORAL at 13:04

## 2024-01-04 RX ADMIN — SERTRALINE HYDROCHLORIDE 50 MG: 50 TABLET ORAL at 08:18

## 2024-01-04 RX ADMIN — MEROPENEM 1000 MG: 1 INJECTION, POWDER, FOR SOLUTION INTRAVENOUS at 17:09

## 2024-01-04 RX ADMIN — ARFORMOTEROL TARTRATE 15 MCG: 15 SOLUTION RESPIRATORY (INHALATION) at 06:31

## 2024-01-04 RX ADMIN — ASPIRIN 81 MG: 81 TABLET, COATED ORAL at 08:18

## 2024-01-04 RX ADMIN — Medication 1 CAPSULE: at 08:18

## 2024-01-04 RX ADMIN — ALLOPURINOL 300 MG: 300 TABLET ORAL at 08:18

## 2024-01-04 RX ADMIN — MEMANTINE 10 MG: 10 TABLET ORAL at 08:18

## 2024-01-04 RX ADMIN — IPRATROPIUM BROMIDE 0.5 MG: 0.5 SOLUTION RESPIRATORY (INHALATION) at 10:30

## 2024-01-04 RX ADMIN — FINASTERIDE 5 MG: 5 TABLET, FILM COATED ORAL at 20:53

## 2024-01-04 RX ADMIN — APIXABAN 5 MG: 5 TABLET, FILM COATED ORAL at 20:53

## 2024-01-04 NOTE — PROGRESS NOTES
[facility] Medicine Services  PROGRESS NOTE    Patient Name: Darien Camarena  : 1935  MRN: [id number]    Date of Admission: 1/3/2024  Primary Care Physician: Pito Way MD    Subjective   Subjective     CC:  hypotensive and lightheaded    HPI  Up walking the halls, no longer lightheaded, denies dyspnea.        Objective   Objective     Vital Signs:   Temp:  [97.4 °F (36.3 °C)-98 °F (36.7 °C)] 97.9 °F (36.6 °C)  Heart Rate:  [] 125  Resp:  [12-16] 16  BP: ()/() 144/109     Physical Exam:  Gen:  WD/WN man up walking the wells with contact guard assist, steady on his feet, conversant and pleasant   Neuro: alert and oriented, clear speech, follows commands, grossly nonfocal  HEENT:  NC/AT   Neck:  Supple, no LAD  Heart RRR   Lungs not lbaored on RA while walking   Abd:  Soft, nontender, no rebound or guarding, pos BS  Extrem:  No c/c/e    Results Reviewed:  LAB RESULTS:      Lab 24  0511 24  1710 24  1343   WBC 6.73  --  7.82   HEMOGLOBIN 14.3  --  15.7   HEMATOCRIT 43.3  --  48.5   PLATELETS 141  --  157   NEUTROS ABS  --   --  5.65   IMMATURE GRANS (ABS)  --   --  0.05   LYMPHS ABS  --   --  1.16   MONOS ABS  --   --  0.59   EOS ABS  --   --  0.30   MCV 97.7*  --  99.6*   PROCALCITONIN  --   --  <0.02   LACTATE  --  1.6 2.5*   PROTIME  --   --  18.8*         Lab 24  0511 24  1343   SODIUM 138 140   POTASSIUM 3.6 4.3   CHLORIDE 98 96*   CO2 29.0 31.0*   ANION GAP 11.0 13.0   BUN 36* 50*   CREATININE 1.10 1.46*   EGFR 65.0 46.3*   GLUCOSE 109* 128*   CALCIUM 9.1 9.7   MAGNESIUM  --  1.7   TSH  --  5.200*         Lab 24  0511 24  1343   TOTAL PROTEIN 6.7 7.7   ALBUMIN 3.5 3.9   GLOBULIN 3.2 3.8   ALT (SGPT) 16 21   AST (SGOT) 27 38   BILIRUBIN 1.2 1.4*   ALK PHOS 115 129*         Lab 24  1343   PROBNP 1,291.0   HSTROP T 39*   PROTIME 18.8*   INR 1.56*                 Brief Urine Lab Results  (Last result in  the past 365 days)        Color   Clarity   Blood   Leuk Est   Nitrite   Protein   CREAT   Urine HCG        01/03/24 1441 Yellow   Clear   Negative   Moderate (2+)   Positive   Negative                   Microbiology Results Abnormal       None            CT Angiogram Chest    Result Date: 1/3/2024  CT ANGIOGRAM CHEST Date of Exam: 1/3/2024 4:34 PM EST Indication: hypotension. Comparison: 11/17/2023. Technique: CTA of the chest was performed after the uneventful intravenous administration of 80 mL Isovue-370. Reconstructed coronal and sagittal images were also obtained. In addition, a 3-D volume rendered image was created for interpretation. Automated exposure control and iterative reconstruction methods were used. Findings: There are no filling defects suspicious for pulmonary embolism. There is a left atrial appendage occlusion device. There are no enlarged mediastinal nodes. There is no hilar adenopathy. There are no pleural effusions. There is slightly nodular appearing infiltrate of the left lower lobe. It was present previously. The right lung is clear.     Impression: Impression: Negative exam for pulmonary embolism. Chronic infiltrate of the left lower lobe. No acute process. Electronically Signed: Aster Mathur MD  1/3/2024 4:48 PM EST  Workstation ID: [id number]    XR Chest 1 View    Result Date: 1/3/2024  XR CHEST 1 VW Date of Exam: 1/3/2024 2:12 PM EST Indication: Dysrhythmia triage protocol Comparison: 12/22/2022 Findings: Heart size is within normal limits. The left hemidiaphragm is elevated with left basilar atelectasis. The lungs are otherwise clear. The pulmonary vascular markings appear normal.     Impression: Impression: 1. Chronic elevation of the left hemidiaphragm with mild left basilar atelectasis. 2. No acute process in the chest Electronically Signed: Anthony Solano MD  1/3/2024 2:25 PM EST  Workstation ID: [id number]     Results for orders placed during the hospital encounter of  11/28/23    Intra-Op Structural Heart FARHAD (Cardiology Read)    Interpretation Summary    Left ventricular systolic function is low normal. Estimated left ventricular EF = 50%    Following placement of a 27 mm Watchman FLX device, the device appears well-seated. No flow is seen around the device. Compression is appropriate.    No pericardial effusion is seen.      Current medications:  Scheduled Meds:allopurinol, 300 mg, Oral, Daily  apixaban, 5 mg, Oral, Q12H  arformoterol, 15 mcg, Nebulization, BID - RT  aspirin, 81 mg, Oral, Daily  donepezil, 10 mg, Oral, Nightly  finasteride, 5 mg, Oral, Nightly  insulin lispro, 2-7 Units, Subcutaneous, 4x Daily AC & at Bedtime  ipratropium, 0.5 mg, Nebulization, 4x Daily - RT  lactobacillus acidophilus, 1 capsule, Oral, Daily  memantine, 10 mg, Oral, BID  meropenem, 1,000 mg, Intravenous, Q12H  multivitamin with minerals, 1 tablet, Oral, Nightly  pantoprazole, 40 mg, Oral, Q AM  pravastatin, 40 mg, Oral, Nightly  senna-docusate sodium, 2 tablet, Oral, BID  sertraline, 50 mg, Oral, Daily  sodium chloride, 10 mL, Intravenous, Q12H  tamsulosin, 0.4 mg, Oral, Daily      Continuous Infusions:lactated ringers, 100 mL/hr, Last Rate: 100 mL/hr (01/04/24 0456)      PRN Meds:.  acetaminophen **OR** acetaminophen **OR** acetaminophen    senna-docusate sodium **AND** polyethylene glycol **AND** bisacodyl **AND** bisacodyl    dextrose    dextrose    glucagon (human recombinant)    ipratropium-albuterol    ondansetron    sodium chloride    [COMPLETED] Insert Peripheral IV **AND** sodium chloride    sodium chloride    sodium chloride    Assessment & Plan   Assessment & Plan     Active Hospital Problems    Diagnosis  POA    **UTI (urinary tract infection) [N39.0]  Yes    Acute kidney injury superimposed on chronic kidney disease [N17.9, N18.9]  Unknown    Paroxysmal atrial fibrillation [I48.0]  Yes    Type 2 diabetes mellitus with stage 3 chronic kidney disease, without long-term current use of  insulin [E11.22, N18.30]  Yes    Enlarged prostate without lower urinary tract symptoms (luts) [N40.0]  Yes    Gastroesophageal reflux disease with esophagitis [K21.00]  Yes    Gout [M10.9]  Yes    Hyperlipidemia LDL goal <100 [E78.5]  Yes    Essential hypertension [I10]  Yes      Resolved Hospital Problems   No resolved problems to display.        Brief Hospital Course to date:  Darien Camarena is a 88 y.o. male w PMH of Afib, CKD, COPD, CAD, BPH, Gout, HLD, HTN, lung cancer, STEVEN, sleep apnea, UTI.GI bleed.  Referred to ED by PCP due to lightheadedness with low BP.  Recent Watchman.      All probmems are new to me     Hypotensive  Dehydrated  Lactic acidosis, resolved   JENNIFER  - BP 80/48 in PCP office.  Received crystalloid 3L in ED.  Now normotensive  - check orthostatics, ambulate  - Creat baseline is 0.9, was 1.5 on admission, is 1.1 today.      Urinary retention, UTI,   - freq UTIs  - self caths at home QID   - on meropenem; history of ESBL organisms .  Await cx.  Discussed w Dr Og.      Afib CAD HL - continue eliquis ASA statin  Watchman in 12/2023 (Dr Mcdaniel)  HTN - hold meds     DM:  SSI     Expected Discharge Location and Transportation: home by car  Expected Discharge   Expected Discharge Date: 1/3/2024; Expected Discharge Time:  3:00 PM     DVT prophylaxis:  Medical DVT prophylaxis orders are present.     AM-PAC 6 Clicks Score (PT): 20 (01/04/24 0900)    CODE STATUS:   Code Status and Medical Interventions:   Ordered at: 01/03/24 1048     Level Of Support Discussed With:    Patient     Code Status (Patient has no pulse and is not breathing):    CPR (Attempt to Resuscitate)     Medical Interventions (Patient has pulse or is breathing):    Full Support       Chely Stephens MD  01/04/24

## 2024-01-04 NOTE — CONSULTS
INFECTIOUS DISEASE CONSULT/INITIAL HOSPITAL VISIT    Darien Camarena  1935  [id number]    Date of Consult: 1/4/2024    Admission Date: 1/3/2024      Requesting Provider: No Known Provider  Evaluating Physician: Last Og MD    Reason for Consultation: E. coli bacteremia    History of present illness:    Patient is a 88 y.o. male with atrial fibrillation, Recent watchman procedure by cardiology Dr. Mcdaniel, diabetes mellitus, recurrent urinary tract infections followed by urology Dr. Nickolas Rios, who I have seen in the past for E. Coli ESBL pyelonephritis.    Patient has urinary retention and has been performing I/o catheterizations at home.    Patient had follow up with PT and because of hypotension, dizziness was directed to ED.  Preliminary workup revealed normal wbc, normal procalcitonin. Given fluids.    Urinalysis obtained which showed 21-50 wbc, 4 + bacteria and patient started on meropenem, admitted to hospital.      Patient denies burning upon urination but also performs self catherization.    On methenamine/vit C    Past Medical History:   Diagnosis Date    Arrhythmia     Atrial fibrillation     Bilateral leg cramps     possible new medication related side effects    Chronic kidney disease     Clotting disorder     pt doesnt know about this    COPD (chronic obstructive pulmonary disease)     Coronary artery disease     Diabetes mellitus     doesnt check sugar    E. coli sepsis 06/23/2022    Enlarged prostate without lower urinary tract symptoms (luts) 06/20/2016    Full dentures     GERD (gastroesophageal reflux disease)     Gout     Hearing aid worn     bilat prn    History of colonoscopy 09/12/2012    History of radiation therapy 02/24/2023    SBRT LLL lung    [place] (hard of hearing)     hearing aids prn    Hyperlipidemia     Hypertension     Kidney stone     surgery x1    Lung cancer     STEVEN on CPAP     compliant with machine    PAF (paroxysmal atrial fibrillation)     Prostatism     Sleep  apnea     CPAP HS    Urinary incontinence     Urinary tract infection     Wears glasses     readers       Past Surgical History:   Procedure Laterality Date    ATRIAL APPENDAGE EXCLUSION LEFT WITH TRANSESOPHAGEAL ECHOCARDIOGRAM N/A 2023    Procedure: Atrial Appendage Occlusion;  Surgeon: Anthony Mcdaniel MD;  Location:  [facility];  Service: Cardiovascular;  Laterality: N/A;    CARDIAC CATHETERIZATION Left 2022    Procedure: Left Heart Cath;  Surgeon: Titus Oliveros IV, MD;  Location:  [facility];  Service: Cardiovascular;  Laterality: Left;    CARDIOVERSION      CATARACT EXTRACTION Bilateral     COLONOSCOPY      CYSTOSCOPY      ENDOSCOPY      possible    KIDNEY STONE SURGERY      x1       Family History   Problem Relation Age of Onset    Alzheimer's disease Mother     COPD Father     Lung cancer Father     Cancer Father     Kidney disease Father     No Known Problems Daughter     No Known Problems Daughter     No Known Problems Daughter        Social History     Socioeconomic History    Marital status:    Tobacco Use    Smoking status: Former     Packs/day: 1.00     Years: 15.00     Additional pack years: 0.00     Total pack years: 15.00     Types: Cigarettes     Start date: 1947     Quit date: 1960     Years since quittin.6     Passive exposure: Past    Smokeless tobacco: Former     Types: Chew     Quit date:     Tobacco comments:     started at 13 yo.   Vaping Use    Vaping Use: Never used    Passive vaping exposure: Yes   Substance and Sexual Activity    Alcohol use: No    Drug use: Never    Sexual activity: Not Currently     Partners: Female       Allergies   Allergen Reactions    Xarelto [Rivaroxaban] GI Bleeding     GI bleed    Penicillins Hives     Has tolerated cefepime, ceftriaxone         Medication:    Current Facility-Administered Medications:     acetaminophen (TYLENOL) tablet 650 mg, 650 mg, Oral, Q4H PRN **OR**  acetaminophen (TYLENOL) 160 MG/5ML oral solution 650 mg, 650 mg, Oral, Q4H PRN **OR** acetaminophen (TYLENOL) suppository 650 mg, 650 mg, Rectal, Q4H PRN, Chastity Verdin, APRN    allopurinol (ZYLOPRIM) tablet 300 mg, 300 mg, Oral, Daily, Chastity Verdin, APRN, 300 mg at 01/04/24 0818    apixaban (ELIQUIS) tablet 5 mg, 5 mg, Oral, Q12H, Chastity Verdin, APRN, 5 mg at 01/04/24 0818    arformoterol (BROVANA) nebulizer solution 15 mcg, 15 mcg, Nebulization, BID - RT, Chastity Verdin APRDANIELA, 15 mcg at 01/04/24 0631    aspirin EC tablet 81 mg, 81 mg, Oral, Daily, Chastity Verdin APRN, 81 mg at 01/04/24 0818    sennosides-docusate (PERICOLACE) 8.6-50 MG per tablet 2 tablet, 2 tablet, Oral, BID **AND** polyethylene glycol (MIRALAX) packet 17 g, 17 g, Oral, Daily PRN **AND** bisacodyl (DULCOLAX) EC tablet 5 mg, 5 mg, Oral, Daily PRN **AND** bisacodyl (DULCOLAX) suppository 10 mg, 10 mg, Rectal, Daily PRN, Chastity Verdin, APRN    dextrose (D50W) (25 g/50 mL) IV injection 25 g, 25 g, Intravenous, Q15 Min PRN, Chastity Verdin APRN    dextrose (GLUTOSE) oral gel 15 g, 15 g, Oral, Q15 Min PRN, Chastity Verdin, APRN    donepezil (ARICEPT) tablet 10 mg, 10 mg, Oral, Nightly, Chastity Verdin, APRN, 10 mg at 01/03/24 2028    finasteride (PROSCAR) tablet 5 mg, 5 mg, Oral, Nightly, Chastity Verdin, APRN, 5 mg at 01/03/24 2028    glucagon (GLUCAGEN) injection 1 mg, 1 mg, Intramuscular, Q15 Min PRN, Chastity Verdin, APRN    Insulin Lispro (humaLOG) injection 2-7 Units, 2-7 Units, Subcutaneous, 4x Daily AC & at Bedtime, Chastity Verdin, APRN, 3 Units at 01/04/24 1113    ipratropium (ATROVENT) nebulizer solution 0.5 mg, 0.5 mg, Nebulization, 4x Daily - RT, Chastity Verdin, APRN, 0.5 mg at 01/04/24 1030    ipratropium-albuterol (DUO-NEB) nebulizer solution 3 mL, 3 mL, Nebulization, Q6H PRN, Chastity Verdin, APRN    lactated ringers infusion, 100 mL/hr, Intravenous, Continuous,  Alma Rosa Garcia MD, Last Rate: 100 mL/hr at 01/04/24 0456, 100 mL/hr at 01/04/24 0456    lactobacillus acidophilus (RISAQUAD) capsule 1 capsule, 1 capsule, Oral, Daily, Chastity Verdin, APRN, 1 capsule at 01/04/24 0818    memantine (NAMENDA) tablet 10 mg, 10 mg, Oral, BID, Chastity Verdin, APRN, 10 mg at 01/04/24 0818    meropenem (MERREM) 1,000 mg in sodium chloride 0.9 % 100 mL IVPB, 1,000 mg, Intravenous, Q12H, Alma Rosa Garcia MD, 1,000 mg at 01/04/24 0555    metoprolol tartrate (LOPRESSOR) tablet 100 mg, 100 mg, Oral, BID, SelinChapo jhaveri PharmD, 100 mg at 01/04/24 1304    multivitamin with minerals 1 tablet, 1 tablet, Oral, Nightly, Chastity Verdin, APRDANIELA    ondansetron (ZOFRAN) injection 4 mg, 4 mg, Intravenous, Q6H PRN, Chastity Verdin, APRN    pantoprazole (PROTONIX) EC tablet 40 mg, 40 mg, Oral, Q AM, Chastity Verdin APRN, 40 mg at 01/04/24 0554    pravastatin (PRAVACHOL) tablet 40 mg, 40 mg, Oral, Nightly, Chastity Verdin, APRN, 40 mg at 01/03/24 2029    sertraline (ZOLOFT) tablet 50 mg, 50 mg, Oral, Daily, Chastity Verdin, APRN, 50 mg at 01/04/24 0818    sodium chloride 0.9 % flush 10 mL, 10 mL, Intravenous, PRN, Renny Hendrickson MD    [COMPLETED] Insert Peripheral IV, , , Once **AND** sodium chloride 0.9 % flush 10 mL, 10 mL, Intravenous, PRN, Renny Hendrickson MD    sodium chloride 0.9 % flush 10 mL, 10 mL, Intravenous, Q12H, Chastity Verdin APRN, 10 mL at 01/04/24 0819    sodium chloride 0.9 % flush 10 mL, 10 mL, Intravenous, PRN, Chastity Verdin, APRN    sodium chloride 0.9 % infusion 40 mL, 40 mL, Intravenous, PRN, Chastity Verdin, BERYL    tamsulosin (FLOMAX) 24 hr capsule 0.4 mg, 0.4 mg, Oral, Daily, Chastity Verdin APRN, 0.4 mg at 01/04/24 0818    Antibiotics:  Anti-Infectives (From admission, onward)      Ordered     Dose/Rate Route Frequency Start Stop    01/03/24 9561  meropenem (MERREM) 1,000 mg in sodium chloride 0.9 % 100 mL IVPB         Ordering Provider: Alma Rosa Garcia MD    1,000 mg  over 3 Hours Intravenous Every 12 Hours 24 0600 24 0559    24 2156  meropenem (MERREM) 1,000 mg in sodium chloride 0.9 % 100 mL IVPB        Ordering Provider: Alma Rosa Garcia MD    1,000 mg  over 30 Minutes Intravenous Once 24 2245 24 2321    24 1518  ertapenem (INVanz) 1,000 mg in sodium chloride 0.9 % 100 mL IVPB        Ordering Provider: Renny Hendrickson MD    1,000 mg  200 mL/hr over 30 Minutes Intravenous Once 24 1534 24 1712              Review of Systems:  No fevers, chills    Admits to dizziness, hypotension.    No other complaints      Physical Exam:   Vital Signs  Temp (24hrs), Av.8 °F (36.6 °C), Min:97.4 °F (36.3 °C), Max:98 °F (36.7 °C)    Temp  Min: 97.4 °F (36.3 °C)  Max: 98 °F (36.7 °C)  BP  Min: 107/84  Max: 144/109  Pulse  Min: 89  Max: 126  Resp  Min: 14  Max: 16  SpO2  Min: 91 %  Max: 94 %    GENERAL: Awake and alert, in no acute distress.  Age appropriate, pleasant  HEENT: Normocephalic, atraumatic.  PERRL. EOMI. No conjunctival injection. No icterus. No ext oral lesions    HEART: RRR; No murmur  LUNGS: Clear to auscultation bilaterally   ABDOMEN: Soft, nontender,   EXT:  No cyanosis, clubbing or edema. No cord.  :  Without Del Cid catheter.  MSK:  No joint deformity  SKIN: Warm and dry without cutaneous eruptions on Inspection/palpation.    NEURO: Oriented to PPT.  PSYCHIATRIC: Normal insight and judgement. Cooperative with PE    Laboratory Data    Results from last 7 days   Lab Units 24  0511 24  1343   WBC 10*3/mm3 6.73 7.82   HEMOGLOBIN g/dL 14.3 15.7   HEMATOCRIT % 43.3 48.5   PLATELETS 10*3/mm3 141 157     Results from last 7 days   Lab Units 24  0511   SODIUM mmol/L 138   POTASSIUM mmol/L 3.6   CHLORIDE mmol/L 98   CO2 mmol/L 29.0   BUN mg/dL 36*   CREATININE mg/dL 1.10   GLUCOSE mg/dL 109*   CALCIUM mg/dL 9.1     Results from last 7 days   Lab Units  01/04/24  0511   ALK PHOS U/L 115   BILIRUBIN mg/dL 1.2   ALT (SGPT) U/L 16   AST (SGOT) U/L 27                 Results from last 7 days   Lab Units 01/03/24  1710   LACTATE mmol/L 1.6             Estimated Creatinine Clearance: 65.2 mL/min (by C-G formula based on SCr of 1.1 mg/dL).      Microbiology:  Blood Culture   Date Value Ref Range Status   06/21/2022 Abnormal Stain (C)  Preliminary     BCID, PCR   Date Value Ref Range Status   06/21/2022 Eschericia coli. Identification by BCID2 PCR. (A) Negative by BCID PCR. Culture to Follow. Final     No results found for: CULTURES, HSVCX, URCX  No results found for: EYECULTURE, GCCX, HSVCULTURE, LABHSV  No results found for: LEGIONELLA, MRSACX, MUMPSCX, MYCOPLASCX  No results found for: NOCARDIACX, STOOLCX  Urine Culture   Date Value Ref Range Status   06/21/2022 >100,000 CFU/mL Escherichia coli (A)  Preliminary     No results found for: VIRALCULTU, WOUNDCX        Radiology:  Imaging Results (Last 72 Hours)       Procedure Component Value Units Date/Time    CT Angiogram Chest [[id number]] Collected: 01/03/24 1643     Updated: 01/03/24 1651    Narrative:      CT ANGIOGRAM CHEST    Date of Exam: 1/3/2024 4:34 PM EST    Indication: hypotension.    Comparison: 11/17/2023.    Technique: CTA of the chest was performed after the uneventful intravenous administration of 80 mL Isovue-370. Reconstructed coronal and sagittal images were also obtained. In addition, a 3-D volume rendered image was created for interpretation.   Automated exposure control and iterative reconstruction methods were used.      Findings:  There are no filling defects suspicious for pulmonary embolism. There is a left atrial appendage occlusion device. There are no enlarged mediastinal nodes. There is no hilar adenopathy. There are no pleural effusions. There is slightly nodular appearing   infiltrate of the left lower lobe. It was present previously. The right lung is clear.      Impression:       Impression:  Negative exam for pulmonary embolism. Chronic infiltrate of the left lower lobe. No acute process.        Electronically Signed: Aster Mathur MD    1/3/2024 4:48 PM EST    Workstation ID: [id number]    XR Chest 1 View [[id number]] Collected: 01/03/24 1424     Updated: 01/03/24 1428    Narrative:      XR CHEST 1 VW    Date of Exam: 1/3/2024 2:12 PM EST    Indication: Dysrhythmia triage protocol    Comparison: 12/22/2022    Findings:  Heart size is within normal limits. The left hemidiaphragm is elevated with left basilar atelectasis. The lungs are otherwise clear. The pulmonary vascular markings appear normal.      Impression:      Impression:    1. Chronic elevation of the left hemidiaphragm with mild left basilar atelectasis.  2. No acute process in the chest      Electronically Signed: Anthony Solano MD    1/3/2024 2:25 PM EST    Workstation ID: [id number]              Impression:   Pyuria   Chronic bacteruria vs acute UTI  Normal wbc  Tachycardia  dizziness    PLAN/RECOMMENDATIONS:   Thank you for asking us to see Darien Camarena, I recommend the following:    I suspect that urinalysis will always be inflammatory and urine culture will perpetually grow gnr.    Need to treat with symptomatic (fever/AMS/hypotension/malodorous/cloudy urine)    I wonder if there are other causes of dizziness; orthostatic hypotension/ afib with RVR, other arrhtyhmia, adrenal insuffiency that could have explained symptoms        Procalcitonin suggests against sepsis, respiratory infeciton.    Continue ertapenem x 7 days; home soon provided no other w/u required    D/w Dr. Kat Og MD  1/4/2024  14:03 EST

## 2024-01-04 NOTE — PAYOR COMM NOTE
"Darien Camarena \"Isidro\" (88 y.o. Male)       Date of Birth   1935    Social Security Number       Address   [address]  [facility] [address]    Home Phone   [phone]    MRN   [id number]       Judaism   Gnosticism    Marital Status                               Admission Date   1/3/24    Admission Type   Emergency    Admitting Provider   Alma Rosa Garcia MD    Attending Provider   Chely Stephens MD    Department, Room/Bed   [facility] 2F, S204/1       Discharge Date       Discharge Disposition       Discharge Destination                                 Attending Provider: Chely Stephens MD    Allergies: Xarelto [Rivaroxaban], Penicillins    Isolation: Contact   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), 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), ESBL Klebsiella (23)   Code Status: CPR    Ht: 190.5 cm (75\")   Wt: 117 kg (257 lb)    Admission Cmt: None   Principal Problem: UTI (urinary tract infection) [N39.0]                   Active Insurance as of 1/3/2024       Primary Coverage       Payor Plan Insurance Group Employer/Plan Group    ANTHEM MEDICARE REPLACEMENT ANTHEM MEDICARE ADVANTAGE [id number]       Payor Plan Address Payor Plan Phone Number Payor Plan Fax Number Effective Dates    [address] [phone]  2024 - None Entered    [facility] [id number]         Subscriber Name Subscriber Birth Date Member ID       DARIEN CAMARENA 1935 [id number]                     Emergency Contacts        (Rel.) Home Phone Work Phone Mobile Phone    CHER,SHAWN (Daughter) [phone] -- [phone]    NEIL QUINTERO (Daughter) [phone] -- [phone]    Dolly Taylor (Daughter) -- -- [phone]                 History & Physical        Alma Rosa Garcia MD at 24 1731              [facility] Medicine Services  HISTORY AND PHYSICAL    Patient Name: Darien Camarena  : 1935  MRN: [id number]  Primary Care Physician: Pito Way, " MD  Date of admission: 1/3/2024      Subjective  Subjective     Chief Complaint:  Hypotension     HPI:  Darien Camarena is a 88 y.o. male with PMH of Afib, CKD, COPD, CAD, BPH, Gout, HLD, HTN, lung cancer, STEVEN, sleep apnea, UTI.GI bleed,  Patient states he has been feeling dizzy at times. But denies any falls or LOC. He has been taking his medication as directed at home. He went to see his PCP today and his bp was low so he was sent to the ED to be evaluated. He just had a watchman device implanted and follow up with cardiology on 1/12/24. He denies any pain, dysuria, freq, hematuria fever, chills, or N/V/D    In ED: troponin 39, creat 1.46, total bilirubin 1.4, TSH 5.200, lactate 2.5, procal <0.02, WBC 7.82      Personal History     Past Medical History:   Diagnosis Date    Arrhythmia     Atrial fibrillation     Bilateral leg cramps     possible new medication related side effects    Chronic kidney disease     Clotting disorder     pt doesnt know about this    COPD (chronic obstructive pulmonary disease)     Coronary artery disease     Diabetes mellitus     doesnt check sugar    E. coli sepsis 06/23/2022    Enlarged prostate without lower urinary tract symptoms (luts) 06/20/2016    Full dentures     GERD (gastroesophageal reflux disease)     Gout     Hearing aid worn     bilat prn    History of colonoscopy 09/12/2012    History of radiation therapy 02/24/2023    SBRT LLL lung    [place] (hard of hearing)     hearing aids prn    Hyperlipidemia     Hypertension     Kidney stone     surgery x1    Lung cancer     STEVEN on CPAP     compliant with machine    PAF (paroxysmal atrial fibrillation)     Prostatism     Sleep apnea     CPAP HS    Urinary incontinence     Urinary tract infection     Wears glasses     readers         Oncology Problem List:  Malignant neoplasm of lower lobe of left lung (01/30/2023; Status:   Active)    Oncology/Hematology History   Malignant neoplasm of lower lobe of left lung   1/30/2023 Initial  Diagnosis    Malignant neoplasm of lower lobe of left lung (HCC)     2/14/2023 - 2/24/2023 Radiation    Radiation OncologyTreatment Course:  Darien Camarena received 5000 cGy in 5 fractions to left lung via External Beam Radiation - EBRT.         Past Surgical History:   Procedure Laterality Date    ATRIAL APPENDAGE EXCLUSION LEFT WITH TRANSESOPHAGEAL ECHOCARDIOGRAM N/A 11/28/2023    Procedure: Atrial Appendage Occlusion;  Surgeon: Anthony Mcdaniel MD;  Location:  [facility] INVASIVE [facility];  Service: Cardiovascular;  Laterality: N/A;    CARDIAC CATHETERIZATION Left 09/29/2022    Procedure: Left Heart Cath;  Surgeon: Titus Oliveros IV, MD;  Location:  [facility] C[facility];  Service: Cardiovascular;  Laterality: Left;    CARDIOVERSION      CATARACT EXTRACTION Bilateral     COLONOSCOPY      CYSTOSCOPY      ENDOSCOPY      possible    KIDNEY STONE SURGERY      x1       Family History: family history includes Alzheimer's disease in his mother; COPD in his father; Cancer in his father; Kidney disease in his father; Lung cancer in his father; No Known Problems in his daughter, daughter, and daughter.     Social History:  reports that he quit smoking about 63 years ago. His smoking use included cigarettes. He started smoking about 77 years ago. He has a 15.00 pack-year smoking history. He has been exposed to tobacco smoke. He quit smokeless tobacco use about 13 years ago.  His smokeless tobacco use included chew. He reports that he does not drink alcohol and does not use drugs.  Social History     Social History Narrative    Caffeine: 1 cup of decaff coffee daily        Medications:  Available home medication information reviewed.  Medications Prior to Admission   Medication Sig Dispense Refill Last Dose    albuterol sulfate  (90 Base) MCG/ACT inhaler Inhale 2 puffs Every 4 (Four) Hours As Needed for Wheezing. 18 g 5 1/3/2024    allopurinol (ZYLOPRIM) 300 MG tablet Take 1 tablet by mouth once daily  90 tablet 0 1/3/2024    apixaban (Eliquis) 5 MG tablet tablet Take 1 tablet by mouth Every 12 (Twelve) Hours. 180 tablet 3 1/3/2024    ascorbic acid (VITAMIN C) 1000 MG tablet Take 1 tablet by mouth 2 (Two) Times a Day.   1/3/2024    aspirin 81 MG EC tablet Take 1 tablet by mouth Daily. 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. 90 tablet 1 1/3/2024    Coenzyme Q10 300 MG capsule Take 1 capsule by mouth Every Night.   1/2/2024    docusate sodium (COLACE) 100 MG capsule Take 2 capsules by mouth At Night As Needed for Constipation.   1/2/2024    donepezil (Aricept) 10 MG tablet Take 1 tablet by mouth Every Night. 30 tablet 2 1/2/2024    Ferrous Gluconate (IRON) 240 (27 FE) MG tablet Take 1 tablet by mouth Daily.   1/3/2024    finasteride (PROSCAR) 5 MG tablet TAKE 1 TABLET BY MOUTH EVERY DAY AT BEDTIME (Patient taking differently: Take 1 tablet by mouth Every Night.) 90 tablet 0 1/2/2024    furosemide (Lasix) 20 MG tablet Take 1 tablet by mouth 2 (Two) Times a Day. 180 tablet 2 1/3/2024    memantine (NAMENDA) 10 MG tablet Take 1 tablet by mouth 2 (Two) Times a Day. 60 tablet 5 1/3/2024    metFORMIN (GLUCOPHAGE) 1000 MG tablet Take 1 tablet by mouth twice daily (Patient taking differently: Take 1 tablet by mouth 2 (Two) Times a Day With Meals.) 180 tablet 1 1/3/2024    methenamine (HIPREX) 1 g tablet Take 1 tablet by mouth 2 (Two) Times a Day With Meals.   1/3/2024    metOLazone (ZAROXOLYN) 2.5 MG tablet Take 1 tablet by mouth Daily. 30 tablet 2 1/3/2024    metoprolol tartrate (LOPRESSOR) 100 MG tablet Take 1 tablet by mouth 2 (Two) Times a Day. 180 tablet 3 1/3/2024    multivitamin with minerals (MULTIVITAMIN MEN 50+ PO) Take 1 tablet by mouth Every Night. Centrum Sliver   1/2/2024    omeprazole (priLOSEC) 20 MG capsule Take 1 capsule by mouth once daily 90 capsule 1 1/3/2024    potassium chloride (K-DUR,KLOR-CON) 20 MEQ CR tablet Take 1 tablet by mouth Daily. 90 tablet 1 1/3/2024    pravastatin (PRAVACHOL) 40 MG  tablet Take 1 tablet by mouth once daily (Patient taking differently: Take 1 tablet by mouth Every Night.) 90 tablet 0 1/2/2024    Probiotic Product (PROBIOTIC ADVANCED PO) Take 1 tablet by mouth 2 (Two) Times a Day.   1/3/2024    sertraline (ZOLOFT) 50 MG tablet Take 1 tablet by mouth once daily 90 tablet 0 1/3/2024    tamsulosin (FLOMAX) 0.4 MG capsule 24 hr capsule Take 1 capsule by mouth once daily 90 capsule 1 1/3/2024    tiotropium bromide-olodaterol (STIOLTO RESPIMAT) 2.5-2.5 MCG/ACT aerosol solution inhaler Inhale 2 puffs Daily. 2 inh once a day 1 each 3 1/3/2024    valsartan (DIOVAN) 80 MG tablet Take 1 tablet by mouth Daily. 90 tablet 1 1/3/2024       Allergies   Allergen Reactions    Xarelto [Rivaroxaban] GI Bleeding     GI bleed    Penicillins Hives     Has tolerated cefepime, ceftriaxone       Objective  Objective     Vital Signs:   Temp:  [97.5 °F (36.4 °C)-97.8 °F (36.6 °C)] 97.8 °F (36.6 °C)  Heart Rate:  [68-97] 89  Resp:  [12-14] 14  BP: ()/() 132/104       Physical Exam   Constitutional: Awake, alert  Eyes: PERRLA, sclerae anicteric, no conjunctival injection  HENT: NCAT, mucous membranes moist  Neck: Supple, no thyromegaly, no lymphadenopathy, trachea midline  Respiratory: Clear to auscultation bilaterally, nonlabored respirations  room air   Cardiovascular: RRR, no murmurs, rubs, or gallops, palpable pedal pulses bilaterally  Gastrointestinal: Positive bowel sounds, soft, nontender, nondistended  Musculoskeletal: No bilateral ankle edema, no clubbing or cyanosis to extremities  Psychiatric: Appropriate affect, cooperative  Neurologic: Oriented x 3, strength symmetric in all extremities, Cranial Nerves grossly intact to confrontation, speech clear  Skin: No rashes      Result Review:  I have personally reviewed the results from the time of this admission to 1/3/2024 18:09 EST and agree with these findings:  [x]  Laboratory list / accordion  [x]  Microbiology  [x]  Radiology  []   EKG/Telemetry   []  Cardiology/Vascular   []  Pathology  [x]  Old records  []  Other:  Most notable findings include:         LAB RESULTS:      Lab 01/03/24  1710 01/03/24  1343   WBC  --  7.82   HEMOGLOBIN  --  15.7   HEMATOCRIT  --  48.5   PLATELETS  --  157   NEUTROS ABS  --  5.65   IMMATURE GRANS (ABS)  --  0.05   LYMPHS ABS  --  1.16   MONOS ABS  --  0.59   EOS ABS  --  0.30   MCV  --  99.6*   PROCALCITONIN  --  <0.02   LACTATE 1.6 2.5*   PROTIME  --  18.8*   INR  --  1.56*         Lab 01/03/24  1343   SODIUM 140   POTASSIUM 4.3   CHLORIDE 96*   CO2 31.0*   ANION GAP 13.0   BUN 50*   CREATININE 1.46*   EGFR 46.3*   GLUCOSE 128*   CALCIUM 9.7   MAGNESIUM 1.7   TSH 5.200*         Lab 01/03/24  1343   TOTAL PROTEIN 7.7   ALBUMIN 3.9   GLOBULIN 3.8   ALT (SGPT) 21   AST (SGOT) 38   BILIRUBIN 1.4*   ALK PHOS 129*         Lab 01/03/24  1343   PROBNP 1,291.0   HSTROP T 39*                 UA          10/30/2023    17:49 11/6/2023    12:11 1/3/2024    14:41   Urinalysis   Squamous Epithelial Cells, UA  7-12  0-2    Specific Gravity, UA  1.014  1.012    Ketones, UA Negative  Negative  Negative    Blood, UA  Trace  Negative    Leukocytes,  Germaine/ul  Trace  Moderate (2+)    Nitrite, UA  Negative  Positive    RBC, UA  3-5  0-2    WBC, UA  6-10  21-50    Bacteria, UA  None Seen  4+        Microbiology Results (last 10 days)       ** No results found for the last 240 hours. **            CT Angiogram Chest    Result Date: 1/3/2024  CT ANGIOGRAM CHEST Date of Exam: 1/3/2024 4:34 PM EST Indication: hypotension. Comparison: 11/17/2023. Technique: CTA of the chest was performed after the uneventful intravenous administration of 80 mL Isovue-370. Reconstructed coronal and sagittal images were also obtained. In addition, a 3-D volume rendered image was created for interpretation. Automated exposure control and iterative reconstruction methods were used. Findings: There are no filling defects suspicious for pulmonary embolism.  There is a left atrial appendage occlusion device. There are no enlarged mediastinal nodes. There is no hilar adenopathy. There are no pleural effusions. There is slightly nodular appearing infiltrate of the left lower lobe. It was present previously. The right lung is clear.     Impression: Impression: Negative exam for pulmonary embolism. Chronic infiltrate of the left lower lobe. No acute process. Electronically Signed: Aster Mathur MD  1/3/2024 4:48 PM EST  Workstation ID: [id number]    XR Chest 1 View    Result Date: 1/3/2024  XR CHEST 1 VW Date of Exam: 1/3/2024 2:12 PM EST Indication: Dysrhythmia triage protocol Comparison: 12/22/2022 Findings: Heart size is within normal limits. The left hemidiaphragm is elevated with left basilar atelectasis. The lungs are otherwise clear. The pulmonary vascular markings appear normal.     Impression: Impression: 1. Chronic elevation of the left hemidiaphragm with mild left basilar atelectasis. 2. No acute process in the chest Electronically Signed: Anthony Solano MD  1/3/2024 2:25 PM EST  Workstation ID: [id number]     Results for orders placed during the hospital encounter of 11/28/23    Intra-Op Structural Heart FARHAD (Cardiology Read)    Interpretation Summary    Left ventricular systolic function is low normal. Estimated left ventricular EF = 50%    Following placement of a 27 mm Watchman FLX device, the device appears well-seated. No flow is seen around the device. Compression is appropriate.    No pericardial effusion is seen.      Assessment & Plan  Assessment & Plan     Active Hospital Problems    Diagnosis  POA    **UTI (urinary tract infection) [N39.0]  Yes    Acute kidney injury superimposed on chronic kidney disease [N17.9, N18.9]  Unknown    Paroxysmal atrial fibrillation [I48.0]  Yes     Diagnosed August 2012.   FARHAD-guided ECV, 8/17/2012  CHADS-VASc 5 (age > 75, CAD, HTN, DM)  Successful external cardioversion for asymptomatic atrial fibrillation with RVR,  10/25/18  Successful cardioversion for atrial fibrillation RVR, 3/6/19.  Sotalol increased  Minimally symptomatic atrial fibrillation with cardioversion and the ER, 10/31/2019  ED cardioversion for A. fib with RVR, 7/21/2020  ED cardioversion for asymptomatic A. fib with RVR, 12/27/2020   ED cardioversion for asymptomatic A. fib with RVR x 2  occasions, March 2021  Transition from sotalol to amiodarone, 4/14/2021  Successful FARHAD/ECV May 3, 2021  Successful cardioversion, 8/5/2022  Echo 8/22 EF 50%, anatomically and functionally normal valves      Type 2 diabetes mellitus with stage 3 chronic kidney disease, without long-term current use of insulin [E11.22, N18.30]  Yes    Enlarged prostate without lower urinary tract symptoms (luts) [N40.0]  Yes    Gastroesophageal reflux disease with esophagitis [K21.00]  Yes    Gout [M10.9]  Yes    Hyperlipidemia LDL goal <100 [E78.5]  Yes     Moderate intensity statin therapy reasonable due to diabetic status      Essential hypertension [I10]  Yes     Target blood pressure <130/80 mmHg         Darien Camarena is a 88 y.o. male with PMH of Afib, CKD, COPD, CAD, BPH, Gout, HLD, HTN, lung cancer, STEVEN, sleep apnea, UTI.GI bleed with recent watchman device implanted.    UTI  Hx of ESBL   -- follows with DR Og for recurrent UTI  -- will hold hiprex and vit c for now  -- consult ID in am   -- In and out cath 4 x a day   -- cont Invanz started in ED   -- monitor blood and  urine culture  -- got fluid bolus in ed, cont IVF's    BPH  -- cont flomax and proscar   -- pt self caths at home 4 x a day     DM  -- hold home meds  -- LDSSI     COPD without exac  -- oxygen as needed  -- nebs prn   -- cont home inhalers     CAD  HTN   HLD  Hx of Afib s/p watchman device on 11/28/23  --- hold antihypertensive meds for now  -- cont ASA, statin and eliquis   -- no evidence of PE    JENNIFER on CKD  -- follows with  nephrology  -- baseline creat 0.7-1  -- got 3L of IVF in ED   -- labs in am   -- hold  lasix and zaroxolyn   -monitor for retention in an 87 yo man    Gout  -- cont allopurinol     GERD  -- cont PPI     DVT prophylaxis:  eliquis       CODE STATUS:  full  code   Code Status and Medical Interventions:   Ordered at: 24 1808     Level Of Support Discussed With:    Patient     Code Status (Patient has no pulse and is not breathing):    CPR (Attempt to Resuscitate)     Medical Interventions (Patient has pulse or is breathing):    Full Support       Expected Discharge   Expected Discharge Date: 1/3/2024; Expected Discharge Time:  3:00 PM     Chastity GAVIN Verdin, APRN  24    Patient seen and examined, HE reports feeling much better.  BP stable, he is not toxic.  Cont as above.    Alma Rosa Garcia MD 24 00:26 EST       Electronically signed by Alma Rosa Garcia MD at 24 0026          Physician Progress Notes (all)        Chely Stephens MD at 24 0900              [facility] Medicine Services  PROGRESS NOTE    Patient Name: Darien Camarena  : 1935  MRN: [id number]    Date of Admission: 1/3/2024  Primary Care Physician: Pito Way MD    Subjective   Subjective     CC:  hypotensive and lightheaded    HPI  Up walking the halls, no longer lightheaded, denies dyspnea.        Objective   Objective     Vital Signs:   Temp:  [97.4 °F (36.3 °C)-98 °F (36.7 °C)] 97.9 °F (36.6 °C)  Heart Rate:  [] 125  Resp:  [12-16] 16  BP: ()/() 144/109     Physical Exam:  Gen:  WD/WN man up walking the wells with contact guard assist, steady on his feet, conversant and pleasant   Neuro: alert and oriented, clear speech, follows commands, grossly nonfocal  HEENT:  NC/AT   Neck:  Supple, no LAD  Heart RRR   Lungs not lbaored on RA while walking   Abd:  Soft, nontender, no rebound or guarding, pos BS  Extrem:  No c/c/e    Results Reviewed:  LAB RESULTS:      Lab 24  0511 24  1710 24  1343   WBC 6.73  --  7.82   HEMOGLOBIN 14.3  --   15.7   HEMATOCRIT 43.3  --  48.5   PLATELETS 141  --  157   NEUTROS ABS  --   --  5.65   IMMATURE GRANS (ABS)  --   --  0.05   LYMPHS ABS  --   --  1.16   MONOS ABS  --   --  0.59   EOS ABS  --   --  0.30   MCV 97.7*  --  99.6*   PROCALCITONIN  --   --  <0.02   LACTATE  --  1.6 2.5*   PROTIME  --   --  18.8*         Lab 01/04/24  0511 01/03/24  1343   SODIUM 138 140   POTASSIUM 3.6 4.3   CHLORIDE 98 96*   CO2 29.0 31.0*   ANION GAP 11.0 13.0   BUN 36* 50*   CREATININE 1.10 1.46*   EGFR 65.0 46.3*   GLUCOSE 109* 128*   CALCIUM 9.1 9.7   MAGNESIUM  --  1.7   TSH  --  5.200*         Lab 01/04/24  0511 01/03/24  1343   TOTAL PROTEIN 6.7 7.7   ALBUMIN 3.5 3.9   GLOBULIN 3.2 3.8   ALT (SGPT) 16 21   AST (SGOT) 27 38   BILIRUBIN 1.2 1.4*   ALK PHOS 115 129*         Lab 01/03/24  1343   PROBNP 1,291.0   HSTROP T 39*   PROTIME 18.8*   INR 1.56*                 Brief Urine Lab Results  (Last result in the past 365 days)        Color   Clarity   Blood   Leuk Est   Nitrite   Protein   CREAT   Urine HCG        01/03/24 1441 Yellow   Clear   Negative   Moderate (2+)   Positive   Negative                   Microbiology Results Abnormal       None            CT Angiogram Chest    Result Date: 1/3/2024  CT ANGIOGRAM CHEST Date of Exam: 1/3/2024 4:34 PM EST Indication: hypotension. Comparison: 11/17/2023. Technique: CTA of the chest was performed after the uneventful intravenous administration of 80 mL Isovue-370. Reconstructed coronal and sagittal images were also obtained. In addition, a 3-D volume rendered image was created for interpretation. Automated exposure control and iterative reconstruction methods were used. Findings: There are no filling defects suspicious for pulmonary embolism. There is a left atrial appendage occlusion device. There are no enlarged mediastinal nodes. There is no hilar adenopathy. There are no pleural effusions. There is slightly nodular appearing infiltrate of the left lower lobe. It was present  previously. The right lung is clear.     Impression: Impression: Negative exam for pulmonary embolism. Chronic infiltrate of the left lower lobe. No acute process. Electronically Signed: Aster Mathur MD  1/3/2024 4:48 PM EST  Workstation ID: [id number]    XR Chest 1 View    Result Date: 1/3/2024  XR CHEST 1 VW Date of Exam: 1/3/2024 2:12 PM EST Indication: Dysrhythmia triage protocol Comparison: 12/22/2022 Findings: Heart size is within normal limits. The left hemidiaphragm is elevated with left basilar atelectasis. The lungs are otherwise clear. The pulmonary vascular markings appear normal.     Impression: Impression: 1. Chronic elevation of the left hemidiaphragm with mild left basilar atelectasis. 2. No acute process in the chest Electronically Signed: Anthony Solano MD  1/3/2024 2:25 PM EST  Workstation ID: [id number]     Results for orders placed during the hospital encounter of 11/28/23    Intra-Op Structural Heart FARHAD (Cardiology Read)    Interpretation Summary    Left ventricular systolic function is low normal. Estimated left ventricular EF = 50%    Following placement of a 27 mm Watchman FLX device, the device appears well-seated. No flow is seen around the device. Compression is appropriate.    No pericardial effusion is seen.      Current medications:  Scheduled Meds:allopurinol, 300 mg, Oral, Daily  apixaban, 5 mg, Oral, Q12H  arformoterol, 15 mcg, Nebulization, BID - RT  aspirin, 81 mg, Oral, Daily  donepezil, 10 mg, Oral, Nightly  finasteride, 5 mg, Oral, Nightly  insulin lispro, 2-7 Units, Subcutaneous, 4x Daily AC & at Bedtime  ipratropium, 0.5 mg, Nebulization, 4x Daily - RT  lactobacillus acidophilus, 1 capsule, Oral, Daily  memantine, 10 mg, Oral, BID  meropenem, 1,000 mg, Intravenous, Q12H  multivitamin with minerals, 1 tablet, Oral, Nightly  pantoprazole, 40 mg, Oral, Q AM  pravastatin, 40 mg, Oral, Nightly  senna-docusate sodium, 2 tablet, Oral, BID  sertraline, 50 mg, Oral, Daily  sodium  chloride, 10 mL, Intravenous, Q12H  tamsulosin, 0.4 mg, Oral, Daily      Continuous Infusions:lactated ringers, 100 mL/hr, Last Rate: 100 mL/hr (01/04/24 7907)      PRN Meds:.  acetaminophen **OR** acetaminophen **OR** acetaminophen    senna-docusate sodium **AND** polyethylene glycol **AND** bisacodyl **AND** bisacodyl    dextrose    dextrose    glucagon (human recombinant)    ipratropium-albuterol    ondansetron    sodium chloride    [COMPLETED] Insert Peripheral IV **AND** sodium chloride    sodium chloride    sodium chloride    Assessment & Plan   Assessment & Plan     Active Hospital Problems    Diagnosis  POA    **UTI (urinary tract infection) [N39.0]  Yes    Acute kidney injury superimposed on chronic kidney disease [N17.9, N18.9]  Unknown    Paroxysmal atrial fibrillation [I48.0]  Yes    Type 2 diabetes mellitus with stage 3 chronic kidney disease, without long-term current use of insulin [E11.22, N18.30]  Yes    Enlarged prostate without lower urinary tract symptoms (luts) [N40.0]  Yes    Gastroesophageal reflux disease with esophagitis [K21.00]  Yes    Gout [M10.9]  Yes    Hyperlipidemia LDL goal <100 [E78.5]  Yes    Essential hypertension [I10]  Yes      Resolved Hospital Problems   No resolved problems to display.        Brief Hospital Course to date:  Darien Camarena is a 88 y.o. male w PMH of Afib, CKD, COPD, CAD, BPH, Gout, HLD, HTN, lung cancer, STEVEN, sleep apnea, UTI.GI bleed.  Referred to ED by PCP due to lightheadedness with low BP.  Recent Watchman.      All probmems are new to me     Hypotensive  Dehydrated  Lactic acidosis, resolved   JENNIFER  - BP 80/48 in PCP office.  Received crystalloid 3L in ED.  Now normotensive  - check orthostatics, ambulate  - Creat baseline is 0.9, was 1.5 on admission, is 1.1 today.      Urinary retention, UTI,   - freq UTIs  - self caths at home QID   - on meropenem; history of ESBL organisms .  Await cx.  Discussed w Dr Og.      Afib CAD HL - continue eliquis ASA  statin  Watchman in 12/2023 (Dr Mcdaniel)  HTN - hold meds     DM:  SSI     Expected Discharge Location and Transportation: home by car  Expected Discharge   Expected Discharge Date: 1/3/2024; Expected Discharge Time:  3:00 PM     DVT prophylaxis:  Medical DVT prophylaxis orders are present.     AM-PAC 6 Clicks Score (PT): 20 (01/04/24 0900)    CODE STATUS:   Code Status and Medical Interventions:   Ordered at: 01/03/24 1808     Level Of Support Discussed With:    Patient     Code Status (Patient has no pulse and is not breathing):    CPR (Attempt to Resuscitate)     Medical Interventions (Patient has pulse or is breathing):    Full Support       Chely Stephens MD  01/04/24        Electronically signed by Chely Stephens MD at 01/04/24 1027          Consult Notes (all)        Last Og MD at 01/04/24 1403          INFECTIOUS DISEASE CONSULT/INITIAL HOSPITAL VISIT    Darien Camarena  1935  [id number]    Date of Consult: 1/4/2024    Admission Date: 1/3/2024      Requesting Provider: No Known Provider  Evaluating Physician: Last Og MD    Reason for Consultation: E. coli bacteremia    History of present illness:    Patient is a 88 y.o. male with atrial fibrillation, Recent watchman procedure by cardiology Dr. Mcdaniel, diabetes mellitus, recurrent urinary tract infections followed by urology Dr. Nickolas Rios, who I have seen in the past for E. Coli ESBL pyelonephritis.    Patient has urinary retention and has been performing I/o catheterizations at home.    Patient had follow up with PT and because of hypotension, dizziness was directed to ED.  Preliminary workup revealed normal wbc, normal procalcitonin. Given fluids.    Urinalysis obtained which showed 21-50 wbc, 4 + bacteria and patient started on meropenem, admitted to hospital.      Patient denies burning upon urination but also performs self catherization.    On methenamine/vit C    Past Medical History:   Diagnosis Date    Arrhythmia      Atrial fibrillation     Bilateral leg cramps     possible new medication related side effects    Chronic kidney disease     Clotting disorder     pt doesnt know about this    COPD (chronic obstructive pulmonary disease)     Coronary artery disease     Diabetes mellitus     doesnt check sugar    E. coli sepsis 06/23/2022    Enlarged prostate without lower urinary tract symptoms (luts) 06/20/2016    Full dentures     GERD (gastroesophageal reflux disease)     Gout     Hearing aid worn     bilat prn    History of colonoscopy 09/12/2012    History of radiation therapy 02/24/2023    SBRT LLL lung    [place] (hard of hearing)     hearing aids prn    Hyperlipidemia     Hypertension     Kidney stone     surgery x1    Lung cancer     STEVEN on CPAP     compliant with machine    PAF (paroxysmal atrial fibrillation)     Prostatism     Sleep apnea     CPAP HS    Urinary incontinence     Urinary tract infection     Wears glasses     readers       Past Surgical History:   Procedure Laterality Date    ATRIAL APPENDAGE EXCLUSION LEFT WITH TRANSESOPHAGEAL ECHOCARDIOGRAM N/A 11/28/2023    Procedure: Atrial Appendage Occlusion;  Surgeon: Anthony Mcdaniel MD;  Location:  [facility];  Service: Cardiovascular;  Laterality: N/A;    CARDIAC CATHETERIZATION Left 09/29/2022    Procedure: Left Heart Cath;  Surgeon: Titus Oliveros IV, MD;  Location:  [facility];  Service: Cardiovascular;  Laterality: Left;    CARDIOVERSION      CATARACT EXTRACTION Bilateral     COLONOSCOPY      CYSTOSCOPY      ENDOSCOPY      possible    KIDNEY STONE SURGERY      x1       Family History   Problem Relation Age of Onset    Alzheimer's disease Mother     COPD Father     Lung cancer Father     Cancer Father     Kidney disease Father     No Known Problems Daughter     No Known Problems Daughter     No Known Problems Daughter        Social History     Socioeconomic History    Marital status:    Tobacco Use    Smoking status:  Former     Packs/day: 1.00     Years: 15.00     Additional pack years: 0.00     Total pack years: 15.00     Types: Cigarettes     Start date: 1947     Quit date: 1960     Years since quittin.6     Passive exposure: Past    Smokeless tobacco: Former     Types: Chew     Quit date:     Tobacco comments:     started at 15 yo.   Vaping Use    Vaping Use: Never used    Passive vaping exposure: Yes   Substance and Sexual Activity    Alcohol use: No    Drug use: Never    Sexual activity: Not Currently     Partners: Female       Allergies   Allergen Reactions    Xarelto [Rivaroxaban] GI Bleeding     GI bleed    Penicillins Hives     Has tolerated cefepime, ceftriaxone         Medication:    Current Facility-Administered Medications:     acetaminophen (TYLENOL) tablet 650 mg, 650 mg, Oral, Q4H PRN **OR** acetaminophen (TYLENOL) 160 MG/5ML oral solution 650 mg, 650 mg, Oral, Q4H PRN **OR** acetaminophen (TYLENOL) suppository 650 mg, 650 mg, Rectal, Q4H PRN, Chastity Verdin, APRN    allopurinol (ZYLOPRIM) tablet 300 mg, 300 mg, Oral, Daily, Chastity Verdin APRN, 300 mg at 24 0818    apixaban (ELIQUIS) tablet 5 mg, 5 mg, Oral, Q12H, Chastity Verdin APRDANIELA, 5 mg at 24 0818    arformoterol (BROVANA) nebulizer solution 15 mcg, 15 mcg, Nebulization, BID - RT, Chastity Verdin APRN, 15 mcg at 24 0631    aspirin EC tablet 81 mg, 81 mg, Oral, Daily, Chastity Verdin APRN, 81 mg at 24 0818    sennosides-docusate (PERICOLACE) 8.6-50 MG per tablet 2 tablet, 2 tablet, Oral, BID **AND** polyethylene glycol (MIRALAX) packet 17 g, 17 g, Oral, Daily PRN **AND** bisacodyl (DULCOLAX) EC tablet 5 mg, 5 mg, Oral, Daily PRN **AND** bisacodyl (DULCOLAX) suppository 10 mg, 10 mg, Rectal, Daily PRN, Chastity Verdin, APRN    dextrose (D50W) (25 g/50 mL) IV injection 25 g, 25 g, Intravenous, Q15 Min PRN, Chastity Verdin APRN    dextrose (GLUTOSE) oral gel 15 g, 15 g, Oral, Q15 Min  PRN, Chastity Verdin, APRN    donepezil (ARICEPT) tablet 10 mg, 10 mg, Oral, Nightly, Chastity Verdin APRN, 10 mg at 01/03/24 2028    finasteride (PROSCAR) tablet 5 mg, 5 mg, Oral, Nightly, Chastity Verdin, APRN, 5 mg at 01/03/24 2028    glucagon (GLUCAGEN) injection 1 mg, 1 mg, Intramuscular, Q15 Min PRN, Chastity Verdin APRN    Insulin Lispro (humaLOG) injection 2-7 Units, 2-7 Units, Subcutaneous, 4x Daily AC & at Bedtime, Chastity Verdin APRN, 3 Units at 01/04/24 1113    ipratropium (ATROVENT) nebulizer solution 0.5 mg, 0.5 mg, Nebulization, 4x Daily - RT, Chastity Verdin APRN, 0.5 mg at 01/04/24 1030    ipratropium-albuterol (DUO-NEB) nebulizer solution 3 mL, 3 mL, Nebulization, Q6H PRN, Chastity Verdin APRN    lactated ringers infusion, 100 mL/hr, Intravenous, Continuous, Alma Rosa Garcia MD, Last Rate: 100 mL/hr at 01/04/24 0456, 100 mL/hr at 01/04/24 0456    lactobacillus acidophilus (RISAQUAD) capsule 1 capsule, 1 capsule, Oral, Daily, Chastity Verdin APRN, 1 capsule at 01/04/24 0818    memantine (NAMENDA) tablet 10 mg, 10 mg, Oral, BID, Chastity Verdin APRN, 10 mg at 01/04/24 0818    meropenem (MERREM) 1,000 mg in sodium chloride 0.9 % 100 mL IVPB, 1,000 mg, Intravenous, Q12H, Alma Rosa Garcia MD, 1,000 mg at 01/04/24 0555    metoprolol tartrate (LOPRESSOR) tablet 100 mg, 100 mg, Oral, BID, Chapo Smallwood PharmD, 100 mg at 01/04/24 1304    multivitamin with minerals 1 tablet, 1 tablet, Oral, Nightly, Chastity Verdin, APRDANIELA    ondansetron (ZOFRAN) injection 4 mg, 4 mg, Intravenous, Q6H PRN, Chastity Verdin, APRN    pantoprazole (PROTONIX) EC tablet 40 mg, 40 mg, Oral, Q AM, Chastity Verdin, APRN, 40 mg at 01/04/24 0554    pravastatin (PRAVACHOL) tablet 40 mg, 40 mg, Oral, Nightly, Chastity Verdin, APRN, 40 mg at 01/03/24 2029    sertraline (ZOLOFT) tablet 50 mg, 50 mg, Oral, Daily, Chastity Verdin, APRN, 50 mg at 01/04/24 0818    sodium  chloride 0.9 % flush 10 mL, 10 mL, Intravenous, PRN, Renny Hendrickson MD    [COMPLETED] Insert Peripheral IV, , , Once **AND** sodium chloride 0.9 % flush 10 mL, 10 mL, Intravenous, PRN, Renny Hendrickson MD    sodium chloride 0.9 % flush 10 mL, 10 mL, Intravenous, Q12H, Chastity Verdin, APRN, 10 mL at 24 0819    sodium chloride 0.9 % flush 10 mL, 10 mL, Intravenous, PRN, Chastity Verdin, APRN    sodium chloride 0.9 % infusion 40 mL, 40 mL, Intravenous, PRN, Chastity Verdin, APRN    tamsulosin (FLOMAX) 24 hr capsule 0.4 mg, 0.4 mg, Oral, Daily, Chastity Verdin APRN, 0.4 mg at 24 0818    Antibiotics:  Anti-Infectives (From admission, onward)      Ordered     Dose/Rate Route Frequency Start Stop    24 2156  meropenem (MERREM) 1,000 mg in sodium chloride 0.9 % 100 mL IVPB        Ordering Provider: Alma Rosa Garcia MD    1,000 mg  over 3 Hours Intravenous Every 12 Hours 24 0600 24 0559    24 2156  meropenem (MERREM) 1,000 mg in sodium chloride 0.9 % 100 mL IVPB        Ordering Provider: Alma Rosa Garcia MD    1,000 mg  over 30 Minutes Intravenous Once 24 2245 24 2321    24 1518  ertapenem (INVanz) 1,000 mg in sodium chloride 0.9 % 100 mL IVPB        Ordering Provider: Renny Hendrickson MD    1,000 mg  200 mL/hr over 30 Minutes Intravenous Once 24 1534 24 1712              Review of Systems:  No fevers, chills    Admits to dizziness, hypotension.    No other complaints      Physical Exam:   Vital Signs  Temp (24hrs), Av.8 °F (36.6 °C), Min:97.4 °F (36.3 °C), Max:98 °F (36.7 °C)    Temp  Min: 97.4 °F (36.3 °C)  Max: 98 °F (36.7 °C)  BP  Min: 107/84  Max: 144/109  Pulse  Min: 89  Max: 126  Resp  Min: 14  Max: 16  SpO2  Min: 91 %  Max: 94 %    GENERAL: Awake and alert, in no acute distress.  Age appropriate, pleasant  HEENT: Normocephalic, atraumatic.  PERRL. EOMI. No conjunctival injection. No icterus. No ext oral  lesions    HEART: RRR; No murmur  LUNGS: Clear to auscultation bilaterally   ABDOMEN: Soft, nontender,   EXT:  No cyanosis, clubbing or edema. No cord.  :  Without Del Cid catheter.  MSK:  No joint deformity  SKIN: Warm and dry without cutaneous eruptions on Inspection/palpation.    NEURO: Oriented to PPT.  PSYCHIATRIC: Normal insight and judgement. Cooperative with PE    Laboratory Data    Results from last 7 days   Lab Units 01/04/24  0511 01/03/24  1343   WBC 10*3/mm3 6.73 7.82   HEMOGLOBIN g/dL 14.3 15.7   HEMATOCRIT % 43.3 48.5   PLATELETS 10*3/mm3 141 157     Results from last 7 days   Lab Units 01/04/24  0511   SODIUM mmol/L 138   POTASSIUM mmol/L 3.6   CHLORIDE mmol/L 98   CO2 mmol/L 29.0   BUN mg/dL 36*   CREATININE mg/dL 1.10   GLUCOSE mg/dL 109*   CALCIUM mg/dL 9.1     Results from last 7 days   Lab Units 01/04/24  0511   ALK PHOS U/L 115   BILIRUBIN mg/dL 1.2   ALT (SGPT) U/L 16   AST (SGOT) U/L 27                 Results from last 7 days   Lab Units 01/03/24  1710   LACTATE mmol/L 1.6             Estimated Creatinine Clearance: 65.2 mL/min (by C-G formula based on SCr of 1.1 mg/dL).      Microbiology:  Blood Culture   Date Value Ref Range Status   06/21/2022 Abnormal Stain (C)  Preliminary     BCID, PCR   Date Value Ref Range Status   06/21/2022 Eschericia coli. Identification by BCID2 PCR. (A) Negative by BCID PCR. Culture to Follow. Final     No results found for: CULTURES, HSVCX, URCX  No results found for: EYECULTURE, GCCX, HSVCULTURE, LABHSV  No results found for: LEGIONELLA, MRSACX, MUMPSCX, MYCOPLASCX  No results found for: NOCARDIACX, STOOLCX  Urine Culture   Date Value Ref Range Status   06/21/2022 >100,000 CFU/mL Escherichia coli (A)  Preliminary     No results found for: VIRALCULTU, WOUNDCX        Radiology:  Imaging Results (Last 72 Hours)       Procedure Component Value Units Date/Time    CT Angiogram Chest [[id number]] Collected: 01/03/24 1643     Updated: 01/03/24 1651    Narrative:       CT ANGIOGRAM CHEST    Date of Exam: 1/3/2024 4:34 PM EST    Indication: hypotension.    Comparison: 11/17/2023.    Technique: CTA of the chest was performed after the uneventful intravenous administration of 80 mL Isovue-370. Reconstructed coronal and sagittal images were also obtained. In addition, a 3-D volume rendered image was created for interpretation.   Automated exposure control and iterative reconstruction methods were used.      Findings:  There are no filling defects suspicious for pulmonary embolism. There is a left atrial appendage occlusion device. There are no enlarged mediastinal nodes. There is no hilar adenopathy. There are no pleural effusions. There is slightly nodular appearing   infiltrate of the left lower lobe. It was present previously. The right lung is clear.      Impression:      Impression:  Negative exam for pulmonary embolism. Chronic infiltrate of the left lower lobe. No acute process.        Electronically Signed: Aster Mathur MD    1/3/2024 4:48 PM EST    Workstation ID: [id number]    XR Chest 1 View [[id number]] Collected: 01/03/24 1424     Updated: 01/03/24 1428    Narrative:      XR CHEST 1 VW    Date of Exam: 1/3/2024 2:12 PM EST    Indication: Dysrhythmia triage protocol    Comparison: 12/22/2022    Findings:  Heart size is within normal limits. The left hemidiaphragm is elevated with left basilar atelectasis. The lungs are otherwise clear. The pulmonary vascular markings appear normal.      Impression:      Impression:    1. Chronic elevation of the left hemidiaphragm with mild left basilar atelectasis.  2. No acute process in the chest      Electronically Signed: Anthony Solano MD    1/3/2024 2:25 PM EST    Workstation ID: [id number]              Impression:   Pyuria   Chronic bacteruria vs acute UTI  Normal wbc  Tachycardia  dizziness    PLAN/RECOMMENDATIONS:   Thank you for asking us to see Darien Camarena, I recommend the following:    I suspect that urinalysis will always  be inflammatory and urine culture will perpetually grow gnr.    Need to treat with symptomatic (fever/AMS/hypotension/malodorous/cloudy urine)    I wonder if there are other causes of dizziness; orthostatic hypotension/ afib with RVR, other arrhtyhmia, adrenal insuffiency that could have explained symptoms        Procalcitonin suggests against sepsis, respiratory infeciton.    Continue ertapenem x 7 days; home soon provided no other w/u required    D/w Dr. Kat Og MD  1/4/2024  14:03 EST                    Electronically signed by Last Og MD at 01/04/24 2592

## 2024-01-04 NOTE — THERAPY EVALUATION
Patient Name: Darien Camarena  : 1935    MRN: [id number]                              Today's Date: 2024       Admit Date: 1/3/2024    Visit Dx:     ICD-10-CM ICD-9-CM   1. Severe sepsis  A41.9 038.9    R65.20 995.92   2. Acute UTI  N39.0 599.0   3. Renal insufficiency  N28.9 593.9     Patient Active Problem List   Diagnosis    Atopic rhinitis    Benign (familial) paraproteinemia    Type 2 diabetes mellitus with stage 3 chronic kidney disease, without long-term current use of insulin    Enlarged prostate without lower urinary tract symptoms (luts)    Gastroesophageal reflux disease with esophagitis    Polyp of gallbladder    Gout    Hyperlipidemia LDL goal <100    Essential hypertension    Nephrolithiasis    Obstructive sleep apnea syndrome    Paroxysmal atrial fibrillation    Stage 3 chronic kidney disease    Long term current use of antiarrhythmic medical therapy    Hydronephrosis    Coronary artery disease involving native coronary artery of native heart with angina pectoris    Malignant neoplasm of lower lobe of left lung    Chronic bilateral low back pain without sciatica    Other microscopic hematuria    H/O: GI bleed    Presence of Watchman left atrial appendage closure device    AF (paroxysmal atrial fibrillation)    UTI (urinary tract infection)    Acute kidney injury superimposed on chronic kidney disease     Past Medical History:   Diagnosis Date    Arrhythmia     Atrial fibrillation     Bilateral leg cramps     possible new medication related side effects    Chronic kidney disease     Clotting disorder     pt doesnt know about this    COPD (chronic obstructive pulmonary disease)     Coronary artery disease     Diabetes mellitus     doesnt check sugar    E. coli sepsis 2022    Enlarged prostate without lower urinary tract symptoms (luts) 2016    Full dentures     GERD (gastroesophageal reflux disease)     Gout     Hearing aid worn     bilat prn    History of colonoscopy  09/12/2012    History of radiation therapy 02/24/2023    SBRT LLL lung    [place] (hard of hearing)     hearing aids prn    Hyperlipidemia     Hypertension     Kidney stone     surgery x1    Lung cancer     STEVEN on CPAP     compliant with machine    PAF (paroxysmal atrial fibrillation)     Prostatism     Sleep apnea     CPAP HS    Urinary incontinence     Urinary tract infection     Wears glasses     readers     Past Surgical History:   Procedure Laterality Date    ATRIAL APPENDAGE EXCLUSION LEFT WITH TRANSESOPHAGEAL ECHOCARDIOGRAM N/A 11/28/2023    Procedure: Atrial Appendage Occlusion;  Surgeon: Anthony Mcdaniel MD;  Location:  [facility];  Service: Cardiovascular;  Laterality: N/A;    CARDIAC CATHETERIZATION Left 09/29/2022    Procedure: Left Heart Cath;  Surgeon: Titus Oliveros IV, MD;  Location:  [facility];  Service: Cardiovascular;  Laterality: Left;    CARDIOVERSION      CATARACT EXTRACTION Bilateral     COLONOSCOPY      CYSTOSCOPY      ENDOSCOPY      possible    KIDNEY STONE SURGERY      x1      General Information       Row Name 01/04/24 0936          Physical Therapy Time and Intention    Document Type evaluation  -MB     Mode of Treatment physical therapy  -MB       Row Name 01/04/24 0936          General Information    Patient Profile Reviewed yes  -MB     Prior Level of Function independent:;bed mobility;ADL's;transfer;gait;all household mobility;community mobility;driving;using stairs  PTA pt. was independent w/ household ambulation w/out AD and community ambulation w/ SPC; denies recent falls.  -MB     Existing Precautions/Restrictions fall  -MB     Barriers to Rehab medically complex;previous functional deficit  -MB       Row Name 01/04/24 0936          Living Environment    People in Home alone  -MB       Row Name 01/04/24 0936          Home Main Entrance    Number of Stairs, Main Entrance one  -MB     Stair Railings, Main Entrance none  -MB       Row Name  01/04/24 0936          Stairs Within Home, Primary    Number of Stairs, Within Home, Primary none  -MB       Row Name 01/04/24 0936          Cognition    Orientation Status (Cognition) oriented x 3  -MB       Row Name 01/04/24 0936          Safety Issues, Functional Mobility    Safety Issues Affecting Function (Mobility) insight into deficits/self-awareness;safety precaution awareness  -MB     Impairments Affecting Function (Mobility) balance;coordination;endurance/activity tolerance;strength  -MB               User Key  (r) = Recorded By, (t) = Taken By, (c) = Cosigned By      Initials Name Provider Type    Cherrie Ortiz, PT Physical Therapist                   Mobility       Row Name 01/04/24 1154          Bed Mobility    Comment, (Bed Mobility) Pt. received and left sitting UIC.  -MB       Row Name 01/04/24 1154          Transfers    Comment, (Transfers) Pt. demo safe and appropriate transfer technique w/ no LOB or instability; stands w/ wide YAMILET.  -MB       Row Name 01/04/24 1154          Sit-Stand Transfer    Sit-Stand [place] (Transfers) supervision  -MB     Assistive Device (Sit-Stand Transfers) cane, straight  -MB       Row Name 01/04/24 1154          Gait/Stairs (Locomotion)    [place] Level (Gait) supervision  -MB     Assistive Device (Gait) cane, straight  -MB     Distance in Feet (Gait) 250  -MB     Deviations/Abnormal Patterns (Gait) marilyn decreased;base of support, wide;gait speed decreased;stride length decreased  -MB     [place] Level (Stairs) not tested  -MB     Comment, (Gait/Stairs) Pt. ambulated w/ step through gait pattern w/ slower pace, mildly unsteady but no overt LOB. VCs for forward gaze and encouraged pt. to use SPC, including in the home, for decreased fall risk.  -MB               User Key  (r) = Recorded By, (t) = Taken By, (c) = Cosigned By      Initials Name Provider Type    Cherrie Ortiz, PT Physical Therapist                   Obj/Interventions        Row Name 01/04/24 1157          Range of Motion Comprehensive    General Range of Motion no range of motion deficits identified  -MB     Comment, General Range of Motion BUEs/BLEs WFL for age  -MB       Row Name 01/04/24 1157          Strength Comprehensive (MMT)    General Manual Muscle Testing (MMT) Assessment lower extremity strength deficits identified;upper extremity strength deficits identified  -MB     Comment, General Manual Muscle Testing (MMT) Assessment BLEs/BUEs grossly 4/5  -MB       Row Name 01/04/24 1157          Balance    Balance Assessment sitting static balance;standing static balance;standing dynamic balance  -MB     Static Sitting Balance independent  -MB     Position, Sitting Balance unsupported;sitting in chair  -MB     Static Standing Balance supervision  -MB     Dynamic Standing Balance supervision  -MB     Position/Device Used, Standing Balance supported;cane, straight  -MB     Balance Interventions standing;sit to stand;occupation based/functional task;weight shifting activity;dynamic reaching  -MB       Row Name 01/04/24 1157          Sensory Assessment (Somatosensory)    Sensory Assessment (Somatosensory) LE sensation intact  -MB               User Key  (r) = Recorded By, (t) = Taken By, (c) = Cosigned By      Initials Name Provider Type    Cherrie Ortiz, PT Physical Therapist                   Goals/Plan       Row Name 01/04/24 1202          Bed Mobility Goal 1 (PT)    Activity/Assistive Device (Bed Mobility Goal 1, PT) sit to supine/supine to sit  -MB     [place] Level/Cues Needed (Bed Mobility Goal 1, PT) independent  -MB     Time Frame (Bed Mobility Goal 1, PT) 1 week  -MB       Row Name 01/04/24 1202          Transfer Goal 1 (PT)    Activity/Assistive Device (Transfer Goal 1, PT) sit-to-stand/stand-to-sit;bed-to-chair/chair-to-bed;cane, straight  -MB     [place] Level/Cues Needed (Transfer Goal 1, PT) modified independence  -MB     Time Frame (Transfer Goal  1, PT) 10 days  -MB       Row Name 01/04/24 1202          Gait Training Goal 1 (PT)    Activity/Assistive Device (Gait Training Goal 1, PT) gait (walking locomotion);cane, straight  -MB     [place] Level (Gait Training Goal 1, PT) modified independence  -MB     Distance (Gait Training Goal 1, PT) 150  -MB     Time Frame (Gait Training Goal 1, PT) 10 days  -MB       Row Name 01/04/24 1202          Stairs Goal 1 (PT)    Activity/Assistive Device (Stairs Goal 1, PT) ascending stairs;descending stairs;cane, straight  -MB     [place] Level/Cues Needed (Stairs Goal 1, PT) modified independence  -MB     Number of Stairs (Stairs Goal 1, PT) 1  -MB     Time Frame (Stairs Goal 1, PT) by discharge  -MB       Row Name 01/04/24 1202          Patient Education Goal (PT)    Activity (Patient Education Goal, PT) HEP  -MB     [place]/Cues/Accuracy (Memory Goal 2, PT) demonstrates adequately  -MB     Time Frame (Patient Education Goal, PT) 1 week  -MB       Row Name 01/04/24 1202          Therapy Assessment/Plan (PT)    Planned Therapy Interventions (PT) balance training;bed mobility training;gait training;home exercise program;patient/family education;stair training;strengthening;transfer training  -MB               User Key  (r) = Recorded By, (t) = Taken By, (c) = Cosigned By      Initials Name Provider Type    Cherrie Ortiz, PT Physical Therapist                   Clinical Impression       Row Name 01/04/24 1158          Pain    Pretreatment Pain Rating 0/10 - no pain  -MB     Posttreatment Pain Rating 0/10 - no pain  -MB       Row Name 01/04/24 1158          Plan of Care Review    Plan of Care Reviewed With patient  -MB     Progress no change  -MB     Outcome Evaluation PT eval completed. Patient presents w/ generalized weakness, decreased activity tolerance, mildly unsteady gait, and is slightly below his functional baseline. Pt. denied dizziness throughout. He ambulated in hallway w/ supervision,  straight cane, mildly unsteady gait, but no overt LOB. Skilled IPPT indicated to improve pt's safety and independence w/ functional mobility. Recommend home w/ assist and HHPT at D/C.  -MB       Row Name 01/04/24 1158          Therapy Assessment/Plan (PT)    Patient/Family Therapy Goals Statement (PT) Return to home and PLOF.  -MB     Rehab Potential (PT) good, to achieve stated therapy goals  -MB     Criteria for Skilled Interventions Met (PT) yes;meets criteria;skilled treatment is necessary  -MB     Therapy Frequency (PT) daily  -MB       Row Name 01/04/24 1158          Vital Signs    Pre Systolic BP Rehab 144  -MB     Pre Treatment Diastolic   -MB     Intra Systolic BP Rehab 124  -MB     Intra Treatment Diastolic BP 86  -MB     Pre SpO2 (%) 93  -MB     O2 Delivery Pre Treatment room air  -MB     O2 Delivery Intra Treatment room air  -MB     Post SpO2 (%) 93  -MB     O2 Delivery Post Treatment room air  -MB     Pre Patient Position Sitting  -MB     Intra Patient Position Standing  -MB     Post Patient Position Sitting  -MB       Row Name 01/04/24 1158          Positioning and Restraints    Pre-Treatment Position sitting in chair/recliner  -MB     Post Treatment Position chair  -MB     In Chair notified nsg;reclined;call light within reach;encouraged to call for assist;exit alarm on;waffle cushion;legs elevated;heels elevated  -MB               User Key  (r) = Recorded By, (t) = Taken By, (c) = Cosigned By      Initials Name Provider Type    Cherrie Ortiz, PT Physical Therapist                   Outcome Measures       Row Name 01/04/24 1203 01/04/24 0900       How much help from another person do you currently need...    Turning from your back to your side while in flat bed without using bedrails? 4  -MB 4  -EN    Moving from lying on back to sitting on the side of a flat bed without bedrails? 3  -MB 4  -EN    Moving to and from a bed to a chair (including a wheelchair)? 3  -MB 3  -EN    Standing up  from a chair using your arms (e.g., wheelchair, bedside chair)? 3  -MB 3  -EN    Climbing 3-5 steps with a railing? 3  -MB 3  -EN    To walk in hospital room? 3  -MB 3  -EN    AM-PAC 6 Clicks Score (PT) 19  -MB 20  -EN    Highest Level of Mobility Goal 6 --> Walk 10 steps or more  -MB 6 --> Walk 10 steps or more  -EN      Row Name 01/04/24 1203          Functional Assessment    Outcome Measure Options AM-PAC 6 Clicks Basic Mobility (PT)  -MB               User Key  (r) = Recorded By, (t) = Taken By, (c) = Cosigned By      Initials Name Provider Type    Cherrie Ortiz, PT Physical Therapist    Lauren Troy RN Registered Nurse                                 Physical Therapy Education       Title: PT OT SLP Therapies (Done)       Topic: Physical Therapy (Done)       Point: Mobility training (Done)       Learning Progress Summary             Patient Acceptance, E,D, VU by MB at 1/4/2024 1204                         Point: Home exercise program (Done)       Learning Progress Summary             Patient Acceptance, E,D, VU by MB at 1/4/2024 1204                         Point: Body mechanics (Done)       Learning Progress Summary             Patient Acceptance, E,D, VU by MB at 1/4/2024 1204                         Point: Precautions (Done)       Learning Progress Summary             Patient Acceptance, E,D, VU by MB at 1/4/2024 1204                                         User Key       Initials Effective Dates Name Provider Type [facility] 06/16/21 -  Cherrie June, PT Physical Therapist PT                  PT Recommendation and Plan  Planned Therapy Interventions (PT): balance training, bed mobility training, gait training, home exercise program, patient/family education, stair training, strengthening, transfer training  Plan of Care Reviewed With: patient  Progress: no change  Outcome Evaluation: PT eval completed. Patient presents w/ generalized weakness, decreased activity tolerance,  mildly unsteady gait, and is slightly below his functional baseline. Pt. denied dizziness throughout. He ambulated in hallway w/ supervision, straight cane, mildly unsteady gait, but no overt LOB. Skilled IPPT indicated to improve pt's safety and independence w/ functional mobility. Recommend home w/ assist and HHPT at D/C.     Time Calculation:   PT Evaluation Complexity  History, PT Evaluation Complexity: 1-2 personal factors and/or comorbidities  Examination of Body Systems (PT Eval Complexity): total of 3 or more elements  Clinical Presentation (PT Evaluation Complexity): evolving  Clinical Decision Making (PT Evaluation Complexity): moderate complexity  Overall Complexity (PT Evaluation Complexity): moderate complexity     PT Charges       Row Name 01/04/24 1205             Time Calculation    Start Time 0936  -MB      PT Received On 01/04/24  -MB      PT Goal Re-Cert Due Date 01/14/24  -MB         Timed Charges    37091 - Gait Training Minutes  10  -MB         Untimed Charges    PT Eval/Re-eval Minutes 40  -MB         Total Minutes    Timed Charges Total Minutes 10  -MB      Untimed Charges Total Minutes 40  -MB       Total Minutes 50  -MB                User Key  (r) = Recorded By, (t) = Taken By, (c) = Cosigned By      Initials Name Provider Type    Cherrie Ortiz, PT Physical Therapist                  Therapy Charges for Today       Code Description Service Date Service Provider Modifiers Qty    91387967307 HC GAIT TRAINING EA 15 MIN 1/4/2024 Cherrie June, PT GP 1    57597255030 HC PT EVAL MOD COMPLEXITY 3 1/4/2024 Cherrie June, PT GP 1            PT G-Codes  Outcome Measure Options: AM-PAC 6 Clicks Basic Mobility (PT)  AM-PAC 6 Clicks Score (PT): 19  PT Discharge Summary  Anticipated Discharge Disposition (PT): home with assist, home with home health    Cherrie June, PT  1/4/2024

## 2024-01-05 ENCOUNTER — READMISSION MANAGEMENT (OUTPATIENT)
Dept: CALL CENTER | Facility: HOSPITAL | Age: 89
End: 2024-01-05
Payer: MEDICARE

## 2024-01-05 VITALS
RESPIRATION RATE: 18 BRPM | WEIGHT: 257 LBS | BODY MASS INDEX: 31.95 KG/M2 | OXYGEN SATURATION: 91 % | SYSTOLIC BLOOD PRESSURE: 128 MMHG | DIASTOLIC BLOOD PRESSURE: 95 MMHG | HEIGHT: 75 IN | HEART RATE: 93 BPM | TEMPERATURE: 97.9 F

## 2024-01-05 LAB
ANION GAP SERPL CALCULATED.3IONS-SCNC: 10 MMOL/L (ref 5–15)
BUN SERPL-MCNC: 23 MG/DL (ref 8–23)
BUN/CREAT SERPL: 24.5 (ref 7–25)
CALCIUM SPEC-SCNC: 8.9 MG/DL (ref 8.6–10.5)
CHLORIDE SERPL-SCNC: 98 MMOL/L (ref 98–107)
CO2 SERPL-SCNC: 29 MMOL/L (ref 22–29)
CREAT SERPL-MCNC: 0.94 MG/DL (ref 0.76–1.27)
DEPRECATED RDW RBC AUTO: 52.2 FL (ref 37–54)
EGFRCR SERPLBLD CKD-EPI 2021: 78.5 ML/MIN/1.73
ERYTHROCYTE [DISTWIDTH] IN BLOOD BY AUTOMATED COUNT: 14.4 % (ref 12.3–15.4)
GLUCOSE BLDC GLUCOMTR-MCNC: 127 MG/DL (ref 70–130)
GLUCOSE BLDC GLUCOMTR-MCNC: 132 MG/DL (ref 70–130)
GLUCOSE SERPL-MCNC: 127 MG/DL (ref 65–99)
HCT VFR BLD AUTO: 43.5 % (ref 37.5–51)
HGB BLD-MCNC: 14.3 G/DL (ref 13–17.7)
MCH RBC QN AUTO: 32 PG (ref 26.6–33)
MCHC RBC AUTO-ENTMCNC: 32.9 G/DL (ref 31.5–35.7)
MCV RBC AUTO: 97.3 FL (ref 79–97)
PLATELET # BLD AUTO: 113 10*3/MM3 (ref 140–450)
PMV BLD AUTO: 11 FL (ref 6–12)
POTASSIUM SERPL-SCNC: 3.8 MMOL/L (ref 3.5–5.2)
RBC # BLD AUTO: 4.47 10*6/MM3 (ref 4.14–5.8)
SODIUM SERPL-SCNC: 137 MMOL/L (ref 136–145)
WBC NRBC COR # BLD AUTO: 6.88 10*3/MM3 (ref 3.4–10.8)

## 2024-01-05 PROCEDURE — 85027 COMPLETE CBC AUTOMATED: CPT | Performed by: INTERNAL MEDICINE

## 2024-01-05 PROCEDURE — 80048 BASIC METABOLIC PNL TOTAL CA: CPT | Performed by: INTERNAL MEDICINE

## 2024-01-05 PROCEDURE — 25010000002 MEROPENEM PER 100 MG: Performed by: INTERNAL MEDICINE

## 2024-01-05 PROCEDURE — 94799 UNLISTED PULMONARY SVC/PX: CPT

## 2024-01-05 PROCEDURE — 99239 HOSP IP/OBS DSCHRG MGMT >30: CPT | Performed by: INTERNAL MEDICINE

## 2024-01-05 PROCEDURE — 94664 DEMO&/EVAL PT USE INHALER: CPT

## 2024-01-05 PROCEDURE — 82948 REAGENT STRIP/BLOOD GLUCOSE: CPT

## 2024-01-05 PROCEDURE — 25010000002 ERTAPENEM PER 500 MG: Performed by: INTERNAL MEDICINE

## 2024-01-05 PROCEDURE — 94761 N-INVAS EAR/PLS OXIMETRY MLT: CPT

## 2024-01-05 PROCEDURE — 25810000003 LACTATED RINGERS PER 1000 ML: Performed by: INTERNAL MEDICINE

## 2024-01-05 RX ORDER — VALSARTAN 80 MG/1
40 TABLET ORAL DAILY
Qty: 90 TABLET | Refills: 1 | Status: SHIPPED | OUTPATIENT
Start: 2024-01-05

## 2024-01-05 RX ORDER — METOLAZONE 2.5 MG/1
2.5 TABLET ORAL EVERY OTHER DAY
Qty: 30 TABLET | Refills: 2 | Status: SHIPPED | OUTPATIENT
Start: 2024-01-05

## 2024-01-05 RX ADMIN — ASPIRIN 81 MG: 81 TABLET, COATED ORAL at 08:08

## 2024-01-05 RX ADMIN — ALLOPURINOL 300 MG: 300 TABLET ORAL at 08:08

## 2024-01-05 RX ADMIN — MEROPENEM 1000 MG: 1 INJECTION, POWDER, FOR SOLUTION INTRAVENOUS at 05:10

## 2024-01-05 RX ADMIN — APIXABAN 5 MG: 5 TABLET, FILM COATED ORAL at 08:08

## 2024-01-05 RX ADMIN — SERTRALINE HYDROCHLORIDE 50 MG: 50 TABLET ORAL at 08:09

## 2024-01-05 RX ADMIN — PANTOPRAZOLE SODIUM 40 MG: 40 TABLET, DELAYED RELEASE ORAL at 05:10

## 2024-01-05 RX ADMIN — ARFORMOTEROL TARTRATE 15 MCG: 15 SOLUTION RESPIRATORY (INHALATION) at 06:56

## 2024-01-05 RX ADMIN — IPRATROPIUM BROMIDE 0.5 MG: 0.5 SOLUTION RESPIRATORY (INHALATION) at 06:55

## 2024-01-05 RX ADMIN — METOPROLOL TARTRATE 100 MG: 100 TABLET, FILM COATED ORAL at 08:08

## 2024-01-05 RX ADMIN — MEMANTINE 10 MG: 10 TABLET ORAL at 08:08

## 2024-01-05 RX ADMIN — Medication 1 CAPSULE: at 08:08

## 2024-01-05 RX ADMIN — ERTAPENEM 1000 MG: 1 INJECTION INTRAMUSCULAR; INTRAVENOUS at 09:48

## 2024-01-05 RX ADMIN — SODIUM CHLORIDE, POTASSIUM CHLORIDE, SODIUM LACTATE AND CALCIUM CHLORIDE 100 ML/HR: 600; 310; 30; 20 INJECTION, SOLUTION INTRAVENOUS at 03:14

## 2024-01-05 RX ADMIN — TAMSULOSIN HYDROCHLORIDE 0.4 MG: 0.4 CAPSULE ORAL at 08:08

## 2024-01-05 RX ADMIN — Medication 10 ML: at 08:09

## 2024-01-05 NOTE — PLAN OF CARE
Goal Outcome Evaluation:      -VSS, RA  -Pt in afib on monitor, metoprolol restarted  -Straight cath q6h  -IV abx and fluids infusing                    
Goal Outcome Evaluation:  Plan of Care Reviewed With: patient        Progress: improving  Outcome Evaluation: VSS on RA. Afib on the monitor. No pain or nausea. IVF and IV abx infused per orders. Bladder scan and Straight cath completed per orders. Will continue with plan of care.         
Goal Outcome Evaluation:  Plan of Care Reviewed With: patient        Progress: no change  Outcome Evaluation: PT eval completed. Patient presents w/ generalized weakness, decreased activity tolerance, mildly unsteady gait, and is slightly below his functional baseline. Pt. denied dizziness throughout. He ambulated in hallway w/ supervision, straight cane, mildly unsteady gait, but no overt LOB. Skilled IPPT indicated to improve pt's safety and independence w/ functional mobility. Recommend home w/ assist and HHPT at D/C.      Anticipated Discharge Disposition (PT): home with assist, home with home health  
Goal Outcome Evaluation:  Plan of Care Reviewed With: patient        Progress: no change  Outcome Evaluation: VSS on RA. Afib on monitor. A&O x4. No complaints of pain or nausea. Bladder scan at ~0100 resulted 550 but 1000mL was drained with straight cath. IV ABX infused. No other concerns at this time. POC ongoing.         
none

## 2024-01-05 NOTE — DISCHARGE SUMMARY
[facility] Medicine Services  DISCHARGE SUMMARY    Patient Name: Darien Camarena  : 1935  MRN: [id number]    Date of Admission: 1/3/2024  1:32 PM  Date of Discharge:  2024  Primary Care Physician: Pito Way MD    Consults       Date and Time Order Name Status Description    1/3/2024  6:08 PM Inpatient Infectious Diseases Consult Completed             Hospital Course     Presenting Problem: dizziness     Active Hospital Problems    Diagnosis  POA    **UTI (urinary tract infection) [N39.0]  Yes    Acute kidney injury superimposed on chronic kidney disease [N17.9, N18.9]  Unknown    Paroxysmal atrial fibrillation [I48.0]  Yes    Type 2 diabetes mellitus with stage 3 chronic kidney disease, without long-term current use of insulin [E11.22, N18.30]  Yes    Enlarged prostate without lower urinary tract symptoms (luts) [N40.0]  Yes    Gastroesophageal reflux disease with esophagitis [K21.00]  Yes    Gout [M10.9]  Yes    Hyperlipidemia LDL goal <100 [E78.5]  Yes    Essential hypertension [I10]  Yes      Resolved Hospital Problems   No resolved problems to display.          Hospital Course:  Darien Camarena is a 88 y.o. male with PMH of Afib, CKD, COPD, CAD, BPH, Gout, HLD, HTN, lung cancer, STEVEN, sleep apnea, UTI.GI bleed,  Patient has recently been feeling dizzy at times, went to see his PCP and bp was low.  He was referred to ED.  After 3L of IV fluids his BP normalized, but UA suggested UTI.    He self-caths QID at home and has history of ESBL UTIs.    Hypotension  Dehydration  JENNIFER  - In setting of ARB use, Lasix/metolazone, and possible UTI  - Resolved with IV fluids, 3 L given in ED  - He has remained normotensive OFF his ARB and diuretics.  I discussed this with him and discussed med changes as follows:    - Cutting ARB in half and changing metolazone to every OTHER day.      Possible UTI  - Cultures still pending at [facility]  - ID Dr Og will see him in clinic  tomorrow for ongoing carbapenem treatment and followup of cultures.     Discharge Follow Up Recommendations for outpatient labs/diagnostics:   - [facility] FU on 1/6/2024    Day of Discharge     HPI:   Feels fine.  Walked in halls yesterday without lightheadedness.  Amenable to DC home.  Lives alone; 3 daughters are involved in his care.     Review of Systems  Denies dysuria     Vital Signs:   Temp:  [97.7 °F (36.5 °C)-98.3 °F (36.8 °C)] 97.9 °F (36.6 °C)  Heart Rate:  [] 93  Resp:  [16-18] 18  BP: (114-136)/(92-97) 128/95      Physical Exam:  Gen:  NAD in bed   Neuro: alert and oriented, clear speech, follows commands, grossly nonfocal  HEENT:  NC/AT , OP benign  Neck:  Supple, no LAD  Heart RRR  Lungs not labored   Abd:  Soft, nontender,  Extrem:  No c/c/e      Pertinent  and/or Most Recent Results     LAB RESULTS:      Lab 01/05/24  0509 01/04/24  0511 01/03/24  1710 01/03/24  1343   WBC 6.88 6.73  --  7.82   HEMOGLOBIN 14.3 14.3  --  15.7   HEMATOCRIT 43.5 43.3  --  48.5   PLATELETS 113* 141  --  157   NEUTROS ABS  --   --   --  5.65   IMMATURE GRANS (ABS)  --   --   --  0.05   LYMPHS ABS  --   --   --  1.16   MONOS ABS  --   --   --  0.59   EOS ABS  --   --   --  0.30   MCV 97.3* 97.7*  --  99.6*   PROCALCITONIN  --   --   --  <0.02   LACTATE  --   --  1.6 2.5*   PROTIME  --   --   --  18.8*         Lab 01/05/24  0509 01/04/24  0511 01/03/24  1343   SODIUM 137 138 140   POTASSIUM 3.8 3.6 4.3   CHLORIDE 98 98 96*   CO2 29.0 29.0 31.0*   ANION GAP 10.0 11.0 13.0   BUN 23 36* 50*   CREATININE 0.94 1.10 1.46*   EGFR 78.5 65.0 46.3*   GLUCOSE 127* 109* 128*   CALCIUM 8.9 9.1 9.7   MAGNESIUM  --   --  1.7   TSH  --   --  5.200*         Lab 01/04/24  0511 01/03/24  1343   TOTAL PROTEIN 6.7 7.7   ALBUMIN 3.5 3.9   GLOBULIN 3.2 3.8   ALT (SGPT) 16 21   AST (SGOT) 27 38   BILIRUBIN 1.2 1.4*   ALK PHOS 115 129*         Lab 01/03/24  1343   PROBNP 1,291.0   HSTROP T 39*   PROTIME 18.8*   INR 1.56*                 Brief  Urine Lab Results  (Last result in the past 365 days)        Color   Clarity   Blood   Leuk Est   Nitrite   Protein   CREAT   Urine HCG        01/03/24 1441 Yellow   Clear   Negative   Moderate (2+)   Positive   Negative                 Microbiology Results (last 10 days)       Procedure Component Value - Date/Time    Blood Culture - Blood, Arm, Right [[id number]]  (Normal) Collected: 01/03/24 1520    Lab Status: Preliminary result Specimen: Blood from Arm, Right Updated: 01/05/24 1531     Blood Culture No growth at 2 days    Narrative:      Less than seven (7) mL's of blood was collected.  Insufficient quantity may yield false negative results.    Blood Culture - Blood, Arm, Left [[id number]]  (Normal) Collected: 01/03/24 1520    Lab Status: Preliminary result Specimen: Blood from Arm, Left Updated: 01/05/24 1545     Blood Culture No growth at 2 days    Urine Culture - Urine, Urine, Clean Catch [[id number]]  (Abnormal) Collected: 01/03/24 1441    Lab Status: Preliminary result Specimen: Urine, Clean Catch Updated: 01/05/24 0945     Urine Culture >100,000 CFU/mL Gram Negative Bacilli    Narrative:      Colonization of the urinary tract without infection is common. Treatment is discouraged unless the patient is symptomatic, pregnant, or undergoing an invasive urologic procedure.            CT Angiogram Chest    Result Date: 1/3/2024  CT ANGIOGRAM CHEST Date of Exam: 1/3/2024 4:34 PM EST Indication: hypotension. Comparison: 11/17/2023. Technique: CTA of the chest was performed after the uneventful intravenous administration of 80 mL Isovue-370. Reconstructed coronal and sagittal images were also obtained. In addition, a 3-D volume rendered image was created for interpretation. Automated exposure control and iterative reconstruction methods were used. Findings: There are no filling defects suspicious for pulmonary embolism. There is a left atrial appendage occlusion device. There are no enlarged mediastinal nodes. There  is no hilar adenopathy. There are no pleural effusions. There is slightly nodular appearing infiltrate of the left lower lobe. It was present previously. The right lung is clear.     Impression: Negative exam for pulmonary embolism. Chronic infiltrate of the left lower lobe. No acute process. Electronically Signed: Aster Mathur MD  1/3/2024 4:48 PM EST  Workstation ID: [id number]    XR Chest 1 View    Result Date: 1/3/2024  XR CHEST 1 VW Date of Exam: 1/3/2024 2:12 PM EST Indication: Dysrhythmia triage protocol Comparison: 12/22/2022 Findings: Heart size is within normal limits. The left hemidiaphragm is elevated with left basilar atelectasis. The lungs are otherwise clear. The pulmonary vascular markings appear normal.     Impression: 1. Chronic elevation of the left hemidiaphragm with mild left basilar atelectasis. 2. No acute process in the chest Electronically Signed: Anthony Solano MD  1/3/2024 2:25 PM EST  Workstation ID: [id number]             Results for orders placed during the hospital encounter of 11/28/23    Intra-Op Structural Heart FARHAD (Cardiology Read)    Interpretation Summary    Left ventricular systolic function is low normal. Estimated left ventricular EF = 50%    Following placement of a 27 mm Watchman FLX device, the device appears well-seated. No flow is seen around the device. Compression is appropriate.    No pericardial effusion is seen.      Plan for Follow-up of Pending Labs/Results: per Dr Og [facility]   Pending Labs       Order Current Status    Blood Culture - Blood, Arm, Left Preliminary result    Blood Culture - Blood, Arm, Right Preliminary result    Urine Culture - Urine, Urine, Clean Catch Preliminary result          Discharge Details        Discharge Medications        New Medications        Instructions Start Date   ertapenem 1,000 mg in sodium chloride 0.9 % 100 mL IVPB   1,000 mg, Intravenous, Every 24 Hours Scheduled             Changes to Medications        Instructions Start  Date   finasteride 5 MG tablet  Commonly known as: PROSCAR  What changed: when to take this   5 mg, Oral, Every Night at Bedtime      metFORMIN 1000 MG tablet  Commonly known as: GLUCOPHAGE  What changed: when to take this   Take 1 tablet by mouth twice daily      metOLazone 2.5 MG tablet  Commonly known as: ZAROXOLYN  What changed: when to take this   2.5 mg, Oral, Every Other Day      pravastatin 40 MG tablet  Commonly known as: PRAVACHOL  What changed: when to take this   40 mg, Oral, Daily      valsartan 80 MG tablet  Commonly known as: DIOVAN  What changed: how much to take   Take 0.5 (one-half) tablets by mouth Daily.             Continue These Medications        Instructions Start Date   albuterol sulfate  (90 Base) MCG/ACT inhaler  Commonly known as: PROVENTIL HFA;VENTOLIN HFA;PROAIR HFA   2 puffs, Inhalation, Every 4 Hours PRN      allopurinol 300 MG tablet  Commonly known as: ZYLOPRIM   300 mg, Oral, Daily      apixaban 5 MG tablet tablet  Commonly known as: Eliquis   5 mg, Oral, Every 12 Hours      ascorbic acid 1000 MG tablet  Commonly known as: VITAMIN C   1,000 mg, Oral, 2 Times Daily      aspirin 81 MG EC tablet   81 mg, Oral, Daily, 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.      Coenzyme Q10 300 MG capsule   1 capsule, Oral, Nightly      docusate sodium 100 MG capsule  Commonly known as: COLACE   200 mg, Oral, Nightly PRN      donepezil 10 MG tablet  Commonly known as: Aricept   10 mg, Oral, Nightly      furosemide 20 MG tablet  Commonly known as: Lasix   20 mg, Oral, 2 Times Daily      Iron 240 (27 Fe) MG tablet   1 tablet, Oral, Daily      memantine 10 MG tablet  Commonly known as: NAMENDA   10 mg, Oral, 2 Times Daily      metoprolol tartrate 100 MG tablet  Commonly known as: LOPRESSOR   100 mg, Oral, 2 Times Daily      multivitamin with minerals tablet tablet   1 tablet, Oral, Nightly, Centrum Sliver       omeprazole 20 MG capsule  Commonly known as: priLOSEC   20 mg, Oral, Daily       potassium chloride 20 MEQ CR tablet  Commonly known as: K-DUR,KLOR-CON   20 mEq, Oral, Daily      PROBIOTIC ADVANCED PO   1 tablet, Oral, 2 Times Daily      sertraline 50 MG tablet  Commonly known as: ZOLOFT   Take 1 tablet by mouth once daily      tamsulosin 0.4 MG capsule 24 hr capsule  Commonly known as: FLOMAX   0.4 mg, Oral, Daily      tiotropium bromide-olodaterol 2.5-2.5 MCG/ACT aerosol solution inhaler  Commonly known as: STIOLTO RESPIMAT   2 puffs, Inhalation, Daily, 2 inh once a day             Stop These Medications      methenamine 1 g tablet  Commonly known as: HIPREX              Allergies   Allergen Reactions    Xarelto [Rivaroxaban] GI Bleeding     GI bleed    Penicillins Hives     Has tolerated cefepime, ceftriaxone         Discharge Disposition:  Home or Self Care    Diet:  Hospital:  No active diet order             CODE STATUS:    Code Status and Medical Interventions:   Ordered at: 01/03/24 1808     Level Of Support Discussed With:    Patient     Code Status (Patient has no pulse and is not breathing):    CPR (Attempt to Resuscitate)     Medical Interventions (Patient has pulse or is breathing):    Full Support       Future Appointments   Date Time Provider Department Center   1/11/2024 10:30 AM Bozena Oconnor PA-C [facility]   1/12/2024  2:00 PM [facility]   1/30/2024 10:30 AM Neo Crawford MD [facility]   2/1/2024  4:00 PM Pito Way MD [facility]   2/6/2024 10:30 AM Los Savage MD [facility]   3/7/2024  1:30 PM Anthony Mcdaniel MD [facility]   4/15/2024 10:00 AM [facility] FLO CT 1 [facility] [place]   4/15/2024  2:00 PM Abbe Leary MD [facility] None   10/29/2024  9:30 AM Titus Oliveros IV, MD [facility]       Additional Instructions for the Follow-ups that You Need to Schedule       Discharge Follow-up with Specialty: [facility]; 1 Day   As directed      Specialty: [facility]   Follow Up: 1 Day   Follow Up Details:  Dr Og to arrange abx infusion in office 1/6/2024                      Chely Stephens MD  01/05/24      Time Spent on Discharge:  I spent  35  minutes on this discharge activity which included: face-to-face encounter with the patient, reviewing the data in the system, coordination of the care with the nursing staff as well as consultants, documentation, and entering orders.

## 2024-01-05 NOTE — PROGRESS NOTES
INFECTIOUS DISEASE  f/u    Darien Camarena  1935  [id number]    Date of Consult: 1/5/2024    Admission Date: 1/3/2024      Requesting Provider: No Known Provider  Evaluating Physician: Last Og MD    Reason for Consultation: E. coli bacteremia    History of present illness:    Patient is a 88 y.o. male with atrial fibrillation, Recent watchman procedure by cardiology Dr. Mcdaniel, diabetes mellitus, recurrent urinary tract infections followed by urology Dr. Nickolas Rios, who I have seen in the past for E. Coli ESBL pyelonephritis.    Patient has urinary retention and has been performing I/o catheterizations at home.    Patient had follow up with PT and because of hypotension, dizziness was directed to ED.  Preliminary workup revealed normal wbc, normal procalcitonin. Given fluids.    Urinalysis obtained which showed 21-50 wbc, 4 + bacteria and patient started on meropenem, admitted to hospital.      Patient denies burning upon urination but also performs self catherization.    On methenamine/vit C    1/5/24; feels well; no events overnight no fever, rash, sore throat    Past Medical History:   Diagnosis Date    Arrhythmia     Atrial fibrillation     Bilateral leg cramps     possible new medication related side effects    Chronic kidney disease     Clotting disorder     pt doesnt know about this    COPD (chronic obstructive pulmonary disease)     Coronary artery disease     Diabetes mellitus     doesnt check sugar    E. coli sepsis 06/23/2022    Enlarged prostate without lower urinary tract symptoms (luts) 06/20/2016    Full dentures     GERD (gastroesophageal reflux disease)     Gout     Hearing aid worn     bilat prn    History of colonoscopy 09/12/2012    History of radiation therapy 02/24/2023    SBRT LLL lung    [place] (hard of hearing)     hearing aids prn    Hyperlipidemia     Hypertension     Kidney stone     surgery x1    Lung cancer     STEVEN on CPAP     compliant with machine    PAF (paroxysmal  atrial fibrillation)     Prostatism     Sleep apnea     CPAP HS    Urinary incontinence     Urinary tract infection     Wears glasses     readers       Past Surgical History:   Procedure Laterality Date    ATRIAL APPENDAGE EXCLUSION LEFT WITH TRANSESOPHAGEAL ECHOCARDIOGRAM N/A 2023    Procedure: Atrial Appendage Occlusion;  Surgeon: Anthony Mcdaniel MD;  Location:  [facility];  Service: Cardiovascular;  Laterality: N/A;    CARDIAC CATHETERIZATION Left 2022    Procedure: Left Heart Cath;  Surgeon: Titus Oliveros IV, MD;  Location:  [facility];  Service: Cardiovascular;  Laterality: Left;    CARDIOVERSION      CATARACT EXTRACTION Bilateral     COLONOSCOPY      CYSTOSCOPY      ENDOSCOPY      possible    KIDNEY STONE SURGERY      x1       Family History   Problem Relation Age of Onset    Alzheimer's disease Mother     COPD Father     Lung cancer Father     Cancer Father     Kidney disease Father     No Known Problems Daughter     No Known Problems Daughter     No Known Problems Daughter        Social History     Socioeconomic History    Marital status:    Tobacco Use    Smoking status: Former     Packs/day: 1.00     Years: 15.00     Additional pack years: 0.00     Total pack years: 15.00     Types: Cigarettes     Start date: 1947     Quit date: 1960     Years since quittin.6     Passive exposure: Past    Smokeless tobacco: Former     Types: Chew     Quit date:     Tobacco comments:     started at 13 yo.   Vaping Use    Vaping Use: Never used    Passive vaping exposure: Yes   Substance and Sexual Activity    Alcohol use: No    Drug use: Never    Sexual activity: Not Currently     Partners: Female       Allergies   Allergen Reactions    Xarelto [Rivaroxaban] GI Bleeding     GI bleed    Penicillins Hives     Has tolerated cefepime, ceftriaxone         Medication:    Current Facility-Administered Medications:     acetaminophen (TYLENOL) tablet 650  mg, 650 mg, Oral, Q4H PRN **OR** acetaminophen (TYLENOL) 160 MG/5ML oral solution 650 mg, 650 mg, Oral, Q4H PRN **OR** acetaminophen (TYLENOL) suppository 650 mg, 650 mg, Rectal, Q4H PRN, Chastity Verdin, APRN    allopurinol (ZYLOPRIM) tablet 300 mg, 300 mg, Oral, Daily, Chastity Verdin, APRN, 300 mg at 01/05/24 0808    apixaban (ELIQUIS) tablet 5 mg, 5 mg, Oral, Q12H, Chastity Verdin, APRN, 5 mg at 01/05/24 0808    arformoterol (BROVANA) nebulizer solution 15 mcg, 15 mcg, Nebulization, BID - RT, Chastity Verdin APRDANIELA, 15 mcg at 01/05/24 0656    aspirin EC tablet 81 mg, 81 mg, Oral, Daily, Chastity Verdin, APRN, 81 mg at 01/05/24 0808    sennosides-docusate (PERICOLACE) 8.6-50 MG per tablet 2 tablet, 2 tablet, Oral, BID **AND** polyethylene glycol (MIRALAX) packet 17 g, 17 g, Oral, Daily PRN **AND** bisacodyl (DULCOLAX) EC tablet 5 mg, 5 mg, Oral, Daily PRN **AND** bisacodyl (DULCOLAX) suppository 10 mg, 10 mg, Rectal, Daily PRN, Chastity Verdin, APRDANIELA    dextrose (D50W) (25 g/50 mL) IV injection 25 g, 25 g, Intravenous, Q15 Min PRN, Chastity Verdin APRN    dextrose (GLUTOSE) oral gel 15 g, 15 g, Oral, Q15 Min PRN, Chastity Verdin, APRN    donepezil (ARICEPT) tablet 10 mg, 10 mg, Oral, Nightly, Chastity Verdin, APRN, 10 mg at 01/04/24 2053    ertapenem (INVanz) 1,000 mg in sodium chloride 0.9 % 100 mL IVPB, 1,000 mg, Intravenous, Q24H, Last Og MD    finasteride (PROSCAR) tablet 5 mg, 5 mg, Oral, Nightly, Chastity Verdin, APRN, 5 mg at 01/04/24 2053    glucagon (GLUCAGEN) injection 1 mg, 1 mg, Intramuscular, Q15 Min PRN, Chastity Verdin, APRN    Insulin Lispro (humaLOG) injection 2-7 Units, 2-7 Units, Subcutaneous, 4x Daily AC & at Bedtime, Chastity Verdin, BERYL, 3 Units at 01/04/24 1113    ipratropium (ATROVENT) nebulizer solution 0.5 mg, 0.5 mg, Nebulization, 4x Daily - RT, Chastity Verdin, APRN, 0.5 mg at 01/05/24 0655    ipratropium-albuterol  (DUO-NEB) nebulizer solution 3 mL, 3 mL, Nebulization, Q6H PRN, Chastity Verdin, BERYL    lactated ringers infusion, 100 mL/hr, Intravenous, Continuous, Alma Rosa Garcia MD, Last Rate: 100 mL/hr at 01/05/24 0314, 100 mL/hr at 01/05/24 0314    lactobacillus acidophilus (RISAQUAD) capsule 1 capsule, 1 capsule, Oral, Daily, Chastity Verdin APRN, 1 capsule at 01/05/24 0808    memantine (NAMENDA) tablet 10 mg, 10 mg, Oral, BID, Chastity Verdin, APRN, 10 mg at 01/05/24 0808    metoprolol tartrate (LOPRESSOR) tablet 100 mg, 100 mg, Oral, BID, Chapo Smallwood, PharmD, 100 mg at 01/05/24 0808    multivitamin with minerals 1 tablet, 1 tablet, Oral, Nightly, Chastity Verdin, BERYL    ondansetron (ZOFRAN) injection 4 mg, 4 mg, Intravenous, Q6H PRN, Chastity Verdin APRN    pantoprazole (PROTONIX) EC tablet 40 mg, 40 mg, Oral, Q AM, Chastity Verdin APRN, 40 mg at 01/05/24 0510    pravastatin (PRAVACHOL) tablet 40 mg, 40 mg, Oral, Nightly, Chastity Verdin, APRN, 40 mg at 01/04/24 2053    sertraline (ZOLOFT) tablet 50 mg, 50 mg, Oral, Daily, Chastity Verdin APRN, 50 mg at 01/05/24 0809    sodium chloride 0.9 % flush 10 mL, 10 mL, Intravenous, PRN, Renny Hendrickson MD    [COMPLETED] Insert Peripheral IV, , , Once **AND** sodium chloride 0.9 % flush 10 mL, 10 mL, Intravenous, PRN, Renny Hendrickson MD    sodium chloride 0.9 % flush 10 mL, 10 mL, Intravenous, Q12H, Chastity Verdin APRN, 10 mL at 01/05/24 0809    sodium chloride 0.9 % flush 10 mL, 10 mL, Intravenous, PRN, Chastity Verdin APRN    sodium chloride 0.9 % infusion 40 mL, 40 mL, Intravenous, PRN, Chastity Verdin, APRN    tamsulosin (FLOMAX) 24 hr capsule 0.4 mg, 0.4 mg, Oral, Daily, Chastity Verdin APRN, 0.4 mg at 01/05/24 0808    Antibiotics:  Anti-Infectives (From admission, onward)      Ordered     Dose/Rate Route Frequency Start Stop    01/05/24 0745  ertapenem (INVanz) 1,000 mg in sodium chloride 0.9 % 100  mL IVPB        Ordering Provider: Last Og MD    1,000 mg  200 mL/hr over 30 Minutes Intravenous Every 24 Hours Scheduled 24 1200 01/10/24 0859    24 0849  ertapenem 1,000 mg in sodium chloride 0.9 % 100 mL IVPB        Ordering Provider: Chely Stephens MD    1,000 mg Intravenous Every 24 Hours Scheduled 24 0000 01/10/24 2359    24 2156  meropenem (MERREM) 1,000 mg in sodium chloride 0.9 % 100 mL IVPB        Ordering Provider: Alma Rosa Garcia MD    1,000 mg  over 30 Minutes Intravenous Once 24 2245 24 2321    24 1518  ertapenem (INVanz) 1,000 mg in sodium chloride 0.9 % 100 mL IVPB        Ordering Provider: Renny Hendrickson MD    1,000 mg  200 mL/hr over 30 Minutes Intravenous Once 24 1534 24 1712              Review of Systems:  See hpi      Physical Exam:   Vital Signs  Temp (24hrs), Av °F (36.7 °C), Min:97.7 °F (36.5 °C), Max:98.3 °F (36.8 °C)    Temp  Min: 97.7 °F (36.5 °C)  Max: 98.3 °F (36.8 °C)  BP  Min: 114/97  Max: 136/97  Pulse  Min: 97  Max: 128  Resp  Min: 16  Max: 18  SpO2  Min: 86 %  Max: 93 %    GENERAL: Awake and alert, in no acute distress.  Age appropriate, pleasant  HEENT: Normocephalic, atraumatic.  PERRL. EOMI. No conjunctival injection. No icterus. No ext oral lesions    HEART: RRR; No murmur  LUNGS: Clear to auscultation bilaterally   ABDOMEN: Soft, nontender,   EXT:  No edema  :  Without Del Cid catheter.  MSK:  No joint deformity  SKIN: Warm and dry without cutaneous eruptions on Inspection/palpation.        Laboratory Data    Results from last 7 days   Lab Units 24  0509 24  0511 24  1343   WBC 10*3/mm3 6.88 6.73 7.82   HEMOGLOBIN g/dL 14.3 14.3 15.7   HEMATOCRIT % 43.5 43.3 48.5   PLATELETS 10*3/mm3 113* 141 157     Results from last 7 days   Lab Units 24  0509   SODIUM mmol/L 137   POTASSIUM mmol/L 3.8   CHLORIDE mmol/L 98   CO2 mmol/L 29.0   BUN mg/dL 23   CREATININE mg/dL 0.94   GLUCOSE  mg/dL 127*   CALCIUM mg/dL 8.9     Results from last 7 days   Lab Units 01/04/24  0511   ALK PHOS U/L 115   BILIRUBIN mg/dL 1.2   ALT (SGPT) U/L 16   AST (SGOT) U/L 27                 Results from last 7 days   Lab Units 01/03/24  1710   LACTATE mmol/L 1.6             Estimated Creatinine Clearance: 76.4 mL/min (by C-G formula based on SCr of 0.94 mg/dL).      Microbiology:  Blood Culture   Date Value Ref Range Status   06/21/2022 Abnormal Stain (C)  Preliminary     BCID, PCR   Date Value Ref Range Status   06/21/2022 Eschericia coli. Identification by BCID2 PCR. (A) Negative by BCID PCR. Culture to Follow. Final     No results found for: CULTURES, HSVCX, URCX  No results found for: EYECULTURE, GCCX, HSVCULTURE, LABHSV  No results found for: LEGIONELLA, MRSACX, MUMPSCX, MYCOPLASCX  No results found for: NOCARDIACX, STOOLCX  Urine Culture   Date Value Ref Range Status   06/21/2022 >100,000 CFU/mL Escherichia coli (A)  Preliminary     No results found for: VIRALCULTU, WOUNDCX        Radiology:  Imaging Results (Last 72 Hours)       Procedure Component Value Units Date/Time    CT Angiogram Chest [[id number]] Collected: 01/03/24 1643     Updated: 01/03/24 1651    Narrative:      CT ANGIOGRAM CHEST    Date of Exam: 1/3/2024 4:34 PM EST    Indication: hypotension.    Comparison: 11/17/2023.    Technique: CTA of the chest was performed after the uneventful intravenous administration of 80 mL Isovue-370. Reconstructed coronal and sagittal images were also obtained. In addition, a 3-D volume rendered image was created for interpretation.   Automated exposure control and iterative reconstruction methods were used.      Findings:  There are no filling defects suspicious for pulmonary embolism. There is a left atrial appendage occlusion device. There are no enlarged mediastinal nodes. There is no hilar adenopathy. There are no pleural effusions. There is slightly nodular appearing   infiltrate of the left lower lobe. It was  present previously. The right lung is clear.      Impression:      Impression:  Negative exam for pulmonary embolism. Chronic infiltrate of the left lower lobe. No acute process.        Electronically Signed: Aster Mathur MD    1/3/2024 4:48 PM EST    Workstation ID: [id number]    XR Chest 1 View [[id number]] Collected: 01/03/24 1424     Updated: 01/03/24 1428    Narrative:      XR CHEST 1 VW    Date of Exam: 1/3/2024 2:12 PM EST    Indication: Dysrhythmia triage protocol    Comparison: 12/22/2022    Findings:  Heart size is within normal limits. The left hemidiaphragm is elevated with left basilar atelectasis. The lungs are otherwise clear. The pulmonary vascular markings appear normal.      Impression:      Impression:    1. Chronic elevation of the left hemidiaphragm with mild left basilar atelectasis.  2. No acute process in the chest      Electronically Signed: Anthony Solano MD    1/3/2024 2:25 PM EST    Workstation ID: [id number]              Impression:   Pyuria   Chronic bacteruria vs acute UTI  Normal wbc  Tachycardia  dizziness    PLAN/RECOMMENDATIONS:   Thank you for asking us to see Darien Camarena, I recommend the following:    I suspect that urinalysis will always be inflammatory and urine culture will perpetually grow gnr.    Need to treat with symptomatic (fever/AMS/hypotension/malodorous/cloudy urine)    I wonder if there are other causes of dizziness; orthostatic hypotension/ afib with RVR, other arrhtyhmia, adrenal insuffiency that could have explained symptoms        Procalcitonin suggests against sepsis, respiratory infeciton.    Continue ertapenem x 7 days; home  today    Manage iv ertapenem as inpat infusion in office x 5 days upon discharge     See orders  1)invanz 1 g iv daily q24h x 5 days  2)daily piv  3)check cbc, cmp, esr,  q Monday  4)fax ID note to [facility] [id number]  5)schedule f/u with me this Sunday    D/w Dr. Stephens, [facility] office  Dc/cm time 30 minutes      Last Og,  MD  1/5/2024  09:42 EST

## 2024-01-05 NOTE — OUTREACH NOTE
Prep Survey      Flowsheet Row Responses   [facility] patient discharged from? [place]   Is LACE score < 7 ? No   Eligibility [facility]   Date of Admission 01/03/24   Date of Discharge 01/05/24   Discharge Disposition Home-Health Care [facility]   Discharge diagnosis UTI (urinary tract infection)   Acute kidney injury superimposed on chronic kidney disease   Does the patient have one of the following disease processes/diagnoses(primary or secondary)? Other   Does the patient have Home health ordered? Yes   What is the Home health agency?  [facility]   Prep survey completed? Yes            ISAAC GARCIA - Registered Nurse

## 2024-01-06 LAB — BACTERIA SPEC AEROBE CULT: ABNORMAL

## 2024-01-08 ENCOUNTER — TRANSITIONAL CARE MANAGEMENT TELEPHONE ENCOUNTER (OUTPATIENT)
Dept: CALL CENTER | Facility: HOSPITAL | Age: 89
End: 2024-01-08
Payer: MEDICARE

## 2024-01-08 LAB
BACTERIA SPEC AEROBE CULT: NORMAL
BACTERIA SPEC AEROBE CULT: NORMAL

## 2024-01-08 NOTE — OUTREACH NOTE
Call Center TCM Note      Flowsheet Row Responses   [facility] patient discharged from? [place]   Does the patient have one of the following disease processes/diagnoses(primary or secondary)? Other   TCM attempt successful? Yes   Call start time 1227   Call end time 1230   Discharge diagnosis UTI (urinary tract infection)   Acute kidney injury superimposed on chronic kidney disease   Person spoke with today (if not patient) and relationship Nikki-dtr   Meds reviewed with patient/caregiver? Yes   Is the patient having any side effects they believe may be caused by any medication additions or changes? No   Does the patient have all medications ordered at discharge? Yes   Is the patient taking all medications as directed (includes completed medication regime)? Yes   Medication comments on Oral ABT now.   Comments HOSP DC FU appt 1/11/24 1030 am   Does the patient have an appointment with their PCP within 7-14 days of discharge? Yes   Has home health visited the patient within 72 hours of discharge? N/A   Psychosocial issues? No   Did the patient receive a copy of their discharge instructions? Yes   Nursing interventions Reviewed instructions with patient   What is the patient's perception of their health status since discharge? Improving   Is the patient/caregiver able to teach back signs and symptoms related to disease process for when to call PCP? Yes   Is the patient/caregiver able to teach back signs and symptoms related to disease process for when to call 911? Yes   Is the patient/caregiver able to teach back the hierarchy of who to call/visit for symptoms/problems? PCP, Specialist, Home health nurse, Urgent Care, ED, 911 Yes   TCM call completed? Yes   Wrap up additional comments Daughter reports Pt is doing better. Finished IV abt and is now on PO abt.   Call end time 1230            Leandra Spicer RN    1/8/2024, 12:30 EST

## 2024-01-08 NOTE — OUTREACH NOTE
Call Center TCM Note      Flowsheet Row Responses   [facility] patient discharged from? [place]   Does the patient have one of the following disease processes/diagnoses(primary or secondary)? Other   TCM attempt successful? No  [No VR]   Unsuccessful attempts Attempt 1            Leandra Spicer RN    1/8/2024, 08:12 EST

## 2024-01-11 ENCOUNTER — OFFICE VISIT (OUTPATIENT)
Dept: INTERNAL MEDICINE | Facility: CLINIC | Age: 89
End: 2024-01-11
Payer: MEDICARE

## 2024-01-11 VITALS
BODY MASS INDEX: 32.37 KG/M2 | DIASTOLIC BLOOD PRESSURE: 78 MMHG | RESPIRATION RATE: 18 BRPM | WEIGHT: 259 LBS | HEART RATE: 98 BPM | SYSTOLIC BLOOD PRESSURE: 126 MMHG | TEMPERATURE: 97 F

## 2024-01-11 DIAGNOSIS — I10 ESSENTIAL HYPERTENSION: ICD-10-CM

## 2024-01-11 DIAGNOSIS — M1A.9XX0 CHRONIC GOUT WITHOUT TOPHUS, UNSPECIFIED CAUSE, UNSPECIFIED SITE: ICD-10-CM

## 2024-01-11 DIAGNOSIS — R41.3 MEMORY LOSS: ICD-10-CM

## 2024-01-11 DIAGNOSIS — E11.9 TYPE 2 DIABETES MELLITUS WITHOUT COMPLICATION, WITHOUT LONG-TERM CURRENT USE OF INSULIN: ICD-10-CM

## 2024-01-11 DIAGNOSIS — R42 DIZZINESS: Primary | ICD-10-CM

## 2024-01-11 DIAGNOSIS — I95.9 HYPOTENSION, UNSPECIFIED HYPOTENSION TYPE: ICD-10-CM

## 2024-01-11 RX ORDER — ALLOPURINOL 300 MG/1
300 TABLET ORAL DAILY
Qty: 90 TABLET | Refills: 0 | Status: SHIPPED | OUTPATIENT
Start: 2024-01-11

## 2024-01-11 RX ORDER — DONEPEZIL HYDROCHLORIDE 10 MG/1
10 TABLET, FILM COATED ORAL NIGHTLY
Qty: 90 TABLET | Refills: 0 | Status: SHIPPED | OUTPATIENT
Start: 2024-01-11

## 2024-01-11 NOTE — PROGRESS NOTES
Chief Complaint  Hospital Follow Up Visit    Subjective    Darien Camarena is a 88 y.o. male.     Darien Camarena presents to [facility] for    History of Present Illness    The patient presents today for a hospital follow-up and follow-up on hypertension. He is accompanied by his family.     1. Hospital follow-up. - The patient was admitted to [facility] on 01/03/2024 and discharged on 01/05/2024. Review of ER records shows that the patient was admitted for hypotension, dizziness, lightheadedness, and a urinary tract infection. The patient received IV fluids and his blood pressure medicines, namely ARB and Lasix were put on hold, obviously due to concerns of volume depletion and dehydration, but he responded well to IV fluids. Urinary tract infection was also diagnosed. The patient had a positive urinary culture that grew out E. coli with some resistance and the patient was put on carbapenem. Currently the patient denies dizziness, lightheadedness, burning micturition and dysuria.     2. Abdominal pain. - He is having abdominal discomfort, and he is unsure what caused it. The patient's family reports that Dr. Og's office told the patient that it could be due to the drink Kefir. The patient's family conveys  the patient drank some this morning, and he developed severe abdominal pain, that he could not finish his cereal. The patient's pain has subsided. The patient's family notes that she is not sure if it is due to antibiotics the patient is on.    3. Hypertension. - He has been feeling good since discharge. The patient's family reports that the patient has been checking his blood pressure and he is compliant with blood pressure medications.     The following portions of the patient's history were reviewed and updated as appropriate: allergies, current medications, past family history, past medical history, past social history, past surgical history, and  problem list.    Review of Systems:  A review of systems was performed, and pertinent findings are noted in the HPI.    Objective   Vital Signs:   /78 (BP Location: Right arm, Patient Position: Sitting, Cuff Size: Adult)   Pulse 98   Temp 97 °F (36.1 °C) (Temporal)   Resp 18   Wt 117 kg (259 lb)   BMI 32.37 kg/m²     Body mass index is 32.37 kg/m².    Physical Exam  Constitutional:       General: He is not in acute distress.  HENT:      Mouth/Throat:      Pharynx: Oropharynx is clear.   Eyes:      Extraocular Movements: Extraocular movements intact.      Pupils: Pupils are equal, round, and reactive to light.   Cardiovascular:      Rate and Rhythm: Normal rate and regular rhythm.      Heart sounds: S1 normal and S2 normal. No murmur heard.     No friction rub. No gallop.   Pulmonary:      Breath sounds: No wheezing or rhonchi.   Abdominal:      General: Bowel sounds are normal.      Palpations: There is no mass.      Tenderness: There is no abdominal tenderness.   Neurological:      Mental Status: He is alert and oriented to person, place, and time.               Assessment and Plan  Diagnoses and all orders for this visit:    1. Hospital follow-up for hypotension and UTI.  - I am pleased to report that the patient is doing very well back on his normal medication regimen.    2. Hypertension.  - Blood pressure is controlled here today with continued observation.  - Patient should continue to monitor his blood pressure periodically.  - If there are any concerns of dizziness, lightheadedness, or similar symptoms that he presented previously at the emergency room, he should assess his blood pressure and/or proceed to the emergency room if necessary.    3. Urinary tract infection.  - Patient will follow up with infectious disease in the next few weeks for continued assessment regarding his urinary tract infection along with infectious disease treatment on antibiotics, and follow up to make sure treatment has  been qualified for him to treat his UTI, which did have some resistance organisms found.    4. Diabetes.  - Patient is continued on his diabetes medication and as well as other medications required for refills here today, namely his Aricept and allopurinol for memory issues and gout, respectively.    Otherwise, everything else looks good. He will follow up with Dr. Way next scheduled follow-up or earlier if indicated.      Diagnoses and all orders for this visit:    1. Dizziness (Primary)    2. Hypotension, unspecified hypotension type    3. Essential hypertension    4. Type 2 diabetes mellitus without complication, without long-term current use of insulin    5. Memory loss  -     donepezil (Aricept) 10 MG tablet; Take 1 tablet by mouth Every Night.  Dispense: 90 tablet; Refill: 0    6. Chronic gout without tophus, unspecified cause, unspecified site  -     allopurinol (ZYLOPRIM) 300 MG tablet; Take 1 tablet by mouth Daily.  Dispense: 90 tablet; Refill: 0          Follow Up   No follow-ups on file.  Patient was given instructions and counseling regarding his condition or for health maintenance advice. Please see specific information pulled into the AVS if appropriate.       Transcribed from ambient dictation for Channing Sun MD by Latasha Chase  01/11/24   15:16 EST    Patient or patient representative verbalized consent to the visit recording.  I have personally performed the services described in this document as transcribed by the above individual, and it is both accurate and complete.

## 2024-01-12 ENCOUNTER — HOSPITAL ENCOUNTER (OUTPATIENT)
Dept: CARDIOLOGY | Facility: HOSPITAL | Age: 89
Discharge: HOME | End: 2024-01-12
Payer: MEDICARE

## 2024-01-12 ENCOUNTER — TELEPHONE (OUTPATIENT)
Dept: INTERNAL MEDICINE | Facility: CLINIC | Age: 89
End: 2024-01-12
Payer: MEDICARE

## 2024-01-12 VITALS
HEIGHT: 75 IN | HEART RATE: 112 BPM | WEIGHT: 257.94 LBS | BODY MASS INDEX: 32.07 KG/M2 | DIASTOLIC BLOOD PRESSURE: 74 MMHG | SYSTOLIC BLOOD PRESSURE: 108 MMHG | RESPIRATION RATE: 18 BRPM | OXYGEN SATURATION: 93 %

## 2024-01-12 DIAGNOSIS — Z95.818 PRESENCE OF WATCHMAN LEFT ATRIAL APPENDAGE CLOSURE DEVICE: ICD-10-CM

## 2024-01-12 DIAGNOSIS — I48.20 ATRIAL FIBRILLATION, CHRONIC: ICD-10-CM

## 2024-01-12 LAB
ASCENDING AORTA: 3.6 CM
BH CV VAS BP LEFT ARM: NORMAL MMHG
LV EF 2D ECHO EST: 40 %

## 2024-01-12 PROCEDURE — 93321 DOPPLER ECHO F-UP/LMTD STD: CPT

## 2024-01-12 PROCEDURE — 93312 ECHO TRANSESOPHAGEAL: CPT

## 2024-01-12 PROCEDURE — 93320 DOPPLER ECHO COMPLETE: CPT

## 2024-01-12 PROCEDURE — 25010000002 FENTANYL CITRATE (PF) 50 MCG/ML SOLUTION: Performed by: INTERNAL MEDICINE

## 2024-01-12 PROCEDURE — 76376 3D RENDER W/INTRP POSTPROCES: CPT

## 2024-01-12 PROCEDURE — 93325 DOPPLER ECHO COLOR FLOW MAPG: CPT

## 2024-01-12 PROCEDURE — 25010000002 MIDAZOLAM PER 1 MG: Performed by: INTERNAL MEDICINE

## 2024-01-12 RX ORDER — MIDAZOLAM HYDROCHLORIDE 1 MG/ML
INJECTION INTRAMUSCULAR; INTRAVENOUS
Status: COMPLETED | OUTPATIENT
Start: 2024-01-12 | End: 2024-01-12

## 2024-01-12 RX ORDER — FENTANYL CITRATE 50 UG/ML
INJECTION, SOLUTION INTRAMUSCULAR; INTRAVENOUS
Status: COMPLETED | OUTPATIENT
Start: 2024-01-12 | End: 2024-01-12

## 2024-01-12 RX ADMIN — FENTANYL CITRATE 25 MCG: 50 INJECTION, SOLUTION INTRAMUSCULAR; INTRAVENOUS at 14:07

## 2024-01-12 RX ADMIN — FENTANYL CITRATE 25 MCG: 50 INJECTION, SOLUTION INTRAMUSCULAR; INTRAVENOUS at 14:12

## 2024-01-12 RX ADMIN — MIDAZOLAM 1 MG: 1 INJECTION INTRAMUSCULAR; INTRAVENOUS at 14:12

## 2024-01-12 RX ADMIN — MIDAZOLAM 1 MG: 1 INJECTION INTRAMUSCULAR; INTRAVENOUS at 14:07

## 2024-01-12 RX ADMIN — METHOHEXITAL SODIUM 20 MG: 500 INJECTION, POWDER, LYOPHILIZED, FOR SOLUTION INTRAMUSCULAR; INTRAVENOUS; RECTAL at 14:17

## 2024-01-12 NOTE — H&P
[facility]   History and Physical           IDENTIFICATION: 88-year-old male residing in [place], [place]      There are no active hospital problems to display for this patient.  Problem List  Persistent atrial fibrillation  Diagnosed August 2012.   FARHAD-guided ECV, 8/17/2012  CHADS-VASc 5 (age > 75, CAD, HTN, DM)  Successful external cardioversion for asymptomatic atrial fibrillation with RVR, 10/25/18  Successful cardioversion for atrial fibrillation RVR, 3/6/19.  Sotalol increased  Minimally symptomatic atrial fibrillation with cardioversion and the ER, 10/31/2019  ED cardioversion for A. fib with RVR, 7/21/2020  ED cardioversion for asymptomatic A. fib with RVR, 12/27/2020   ED cardioversion for asymptomatic A. fib with RVR x 2  occasions, March 2021  Transition from sotalol to amiodarone, 4/14/2021  Successful FARHAD/ECV May 3, 2021: normal LVEF, mild MR  Successful cardioversion, 8/5/2022  Echo 8/22 EF 50%, anatomically and functionally normal valves  Unsuccessful ECV 6/08/2023 - pt converted spontaneously to SR 2 min after ECV  GI Bleed - on Xarelto  CAD   Nuclear MPS 09/15/2022:small-sized, mildly severe area of ischemia located in the apex.  LVEF 56%  [facility] 9/29/2022 - mild CAD  HTN  HLD  DM II  GERD  CKD Stage 3  Gout  STEVEN           History of Present Illness: Darien Camarena is an 88-year-old male with above medical history who presents today for transesophageal echocardiogram for 45-day follow-up of Watchman device.  Patient has a history of persistent atrial fibrillation which has been managed on sotalol and amiodarone.  Amiodarone recently discontinued due to concern of toxicity.  He is on metoprolol 100 mg twice daily.  He underwent unsuccessful cardioversion in June 2023.  Is largely asymptomatic, with preserved EF.  Patient was previously on Xarelto, which had to be stopped due to GI bleeding.  He also requires self-catheterization 4 times daily and has been  experiencing some hematuria.  He underwent placement of Watchman device on 11/28/2023, and is currently on Eliquis and aspirin.  He denies any recurrence of hematuria or GI bleed.  Was recently admitted to [facility] 1/3 to 1/5 with hypotension thought to be related to cardiac medications, as well as UTI, which has been managed by LID.  During this admission his ARB was cut in half and his metolazone has been changed to every other day.  He has been normotensive and asymptomatic since then.  Denies any cardiovascular complaints.  He was originally to follow-up with Dr. Mcdaniel 1/4, but was unable to because of hospitalization.      Allergies   Allergen Reactions    Xarelto [Rivaroxaban] GI Bleeding     GI bleed    Penicillins Hives     Has tolerated cefepime, ceftriaxone       Prior to Admission medications    Medication Sig Start Date End Date Taking? Authorizing Provider   albuterol sulfate  (90 Base) MCG/ACT inhaler Inhale 2 puffs Every 4 (Four) Hours As Needed for Wheezing. 11/7/23   Vikram Eagle MD   allopurinol (ZYLOPRIM) 300 MG tablet Take 1 tablet by mouth Daily. 1/11/24   Channing Sun MD   apixaban (Eliquis) 5 MG tablet tablet Take 1 tablet by mouth Every 12 (Twelve) Hours. 9/12/23   Titus Oliveros IV, MD   ascorbic acid (VITAMIN C) 1000 MG tablet Take 1 tablet by mouth 2 (Two) Times a Day.    Manisha West MD   aspirin 81 MG EC tablet Take 1 tablet by mouth Daily. 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. 11/28/23   Haja Henry APRN   Coenzyme Q10 300 MG capsule Take 1 capsule by mouth Every Night.    Manisha West MD   docusate sodium (COLACE) 100 MG capsule Take 2 capsules by mouth At Night As Needed for Constipation.    Manisha West MD   donepezil (Aricept) 10 MG tablet Take 1 tablet by mouth Every Night. 1/11/24   Channing Sun MD   Ferrous Gluconate (IRON) 240 (27 FE) MG tablet Take 1 tablet by mouth Daily. 2/27/14   Manisha West MD   finasteride  (PROSCAR) 5 MG tablet TAKE 1 TABLET BY MOUTH EVERY DAY AT BEDTIME  Patient taking differently: Take 1 tablet by mouth Every Night. 11/10/23   Benja Campbell MD   furosemide (Lasix) 20 MG tablet Take 1 tablet by mouth 2 (Two) Times a Day. 6/21/23   Pito Way MD   memantine (NAMENDA) 10 MG tablet Take 1 tablet by mouth 2 (Two) Times a Day. 12/27/23   Pito Way MD   metFORMIN (GLUCOPHAGE) 1000 MG tablet Take 1 tablet by mouth twice daily  Patient taking differently: Take 1 tablet by mouth 2 (Two) Times a Day With Meals. 10/25/23   Piot Way MD   metOLazone (ZAROXOLYN) 2.5 MG tablet Take 1 tablet by mouth Every Other Day. 1/5/24   Chely Stephens MD   metoprolol tartrate (LOPRESSOR) 100 MG tablet Take 1 tablet by mouth 2 (Two) Times a Day. 9/28/23   Anthony Mcdaniel MD   multivitamin with minerals (MULTIVITAMIN MEN 50+ PO) Take 1 tablet by mouth Every Night. Centrum Sliver    Manisha West MD   omeprazole (priLOSEC) 20 MG capsule Take 1 capsule by mouth once daily 11/28/23   Pito Way MD   potassium chloride (K-DUR,KLOR-CON) 20 MEQ CR tablet Take 1 tablet by mouth Daily. 6/22/23   Pito Way MD   pravastatin (PRAVACHOL) 40 MG tablet Take 1 tablet by mouth once daily  Patient taking differently: Take 1 tablet by mouth Every Night. 11/10/23   Benja Campbell MD   Probiotic Product (PROBIOTIC ADVANCED PO) Take 1 tablet by mouth 2 (Two) Times a Day.    Manisha West MD   sertraline (ZOLOFT) 50 MG tablet Take 1 tablet by mouth once daily 12/18/23   Pito Way MD   tamsulosin (FLOMAX) 0.4 MG capsule 24 hr capsule Take 1 capsule by mouth once daily 8/22/23   Pito Way MD   tiotropium bromide-olodaterol (STIOLTO RESPIMAT) 2.5-2.5 MCG/ACT aerosol solution inhaler Inhale 2 puffs Daily. 2 inh once a day 11/7/23   Vikram Eagle MD   valsartan (DIOVAN) 80 MG tablet Take 0.5 (one-half) tablets by mouth Daily. 1/5/24   Chely Stephens MD        Past Medical History:   Diagnosis Date    Arrhythmia     Atrial fibrillation     Bilateral leg cramps     possible new medication related side effects    Chronic kidney disease     Clotting disorder     pt doesnt know about this    COPD (chronic obstructive pulmonary disease)     Coronary artery disease     Diabetes mellitus     doesnt check sugar    E. coli sepsis 06/23/2022    Enlarged prostate without lower urinary tract symptoms (luts) 06/20/2016    Full dentures     GERD (gastroesophageal reflux disease)     Gout     Hearing aid worn     bilat prn    History of colonoscopy 09/12/2012    History of radiation therapy 02/24/2023    SBRT LLL lung    [place] (hard of hearing)     hearing aids prn    Hyperlipidemia     Hypertension     Kidney stone     surgery x1    Lung cancer     STEVEN on CPAP     compliant with machine    PAF (paroxysmal atrial fibrillation)     Prostatism     Sleep apnea     CPAP HS    Urinary incontinence     Urinary tract infection     Wears glasses     readers       Past Surgical History:   Procedure Laterality Date    ATRIAL APPENDAGE EXCLUSION LEFT WITH TRANSESOPHAGEAL ECHOCARDIOGRAM N/A 11/28/2023    Procedure: Atrial Appendage Occlusion;  Surgeon: Anthony Mcdaniel MD;  Location:  [facility];  Service: Cardiovascular;  Laterality: N/A;    CARDIAC CATHETERIZATION Left 09/29/2022    Procedure: Left Heart Cath;  Surgeon: Titus Oliveros IV, MD;  Location:  [facility];  Service: Cardiovascular;  Laterality: Left;    CARDIOVERSION      CATARACT EXTRACTION Bilateral     COLONOSCOPY      CYSTOSCOPY      ENDOSCOPY      possible    KIDNEY STONE SURGERY      x1       Family History   Problem Relation Age of Onset    Alzheimer's disease Mother     COPD Father     Lung cancer Father     Cancer Father     Kidney disease Father     No Known Problems Daughter     No Known Problems Daughter     No Known Problems Daughter        Social History     Tobacco Use    Smoking Status Former    Packs/day: 1.00    Years: 15.00    Additional pack years: 0.00    Total pack years: 15.00    Types: Cigarettes    Start date: 1947    Quit date: 1960    Years since quittin.7    Passive exposure: Past   Smokeless Tobacco Former    Types: Chew    Quit date:    Tobacco Comments    started at 13 yo.       Social History     Substance and Sexual Activity   Alcohol Use No         Review of Systems:   Review of Systems   All other systems reviewed and are negative.           Vital Sign Min/Max for last 24 hours  Temp  Min: 97 °F (36.1 °C)  Max: 97 °F (36.1 °C)   BP  Min: 126/78  Max: 126/78   Pulse  Min: 98  Max: 98   Resp  Min: 18  Max: 18   No data recorded   No data recorded    No intake or output data in the 24 hours ending 24 1115        Tele: Atrial fibrillation    Vitals reviewed.   Constitutional:       General: Awake.   Pulmonary:      Effort: Pulmonary effort is normal.      Breath sounds: Normal breath sounds.   Cardiovascular:      Tachycardia present. Irregular rhythm. Normal S1. Normal S2.       Murmurs: There is no murmur.   Pulses:     Intact distal pulses.   Edema:     Peripheral edema absent.   Skin:     General: Skin is warm and dry.      Capillary Refill: Capillary refill takes less than 2 seconds.   Neurological:      Mental Status: Alert.              DATA REVIEW:    ECHO (2023):     Left ventricular systolic function is low normal. Estimated left ventricular EF = 50%    Following placement of a 27 mm Watchman FLX device, the device appears well-seated. No flow is seen around the device. Compression is appropriate.    No pericardial effusion is seen.       [unfilled]        Lab Results   Lab Value Date/Time    MG 1.7 2024 1343             Lab Results   Component Value Date    HGBA1C 6.20 (H) 10/30/2023     Lab Results   Component Value Date    CHOL 98 10/30/2023    TRIG 127 10/30/2023    HDL 34 (L) 10/30/2023    LDL 41 10/30/2023             Paroxysmal atrial fibrillation  Has been maintained on amiodarone, which was recently discontinued to limit potential for toxicity.  Metoprolol was increased to 100 mg twice daily.  History of GI bleeding and persistent hematuria with self-catheterization.  Currently on aspirin.  Watchman device implant 11/28/2023.  Hematuria, with 4 times daily self-catheterization                 Proceed with transesophageal echocardiogram for 45-day follow-up of Watchman device.  Procedure, risks, benefits have been discussed and patient is agreeable to proceed.  Recommendations to follow procedure.    BERYL Carmona        Electronically signed by BERYL Carmona, 01/12/24, 11:15 AM EST.

## 2024-01-12 NOTE — TELEPHONE ENCOUNTER
Jesenia from [employer] called needing face to face notes, demographics and insurance cards faxed to: [phone].

## 2024-01-16 ENCOUNTER — TELEPHONE (OUTPATIENT)
Dept: CARDIOLOGY | Facility: HOSPITAL | Age: 89
End: 2024-01-16
Payer: MEDICARE

## 2024-01-16 RX ORDER — CLOPIDOGREL BISULFATE 75 MG/1
75 TABLET ORAL DAILY
Qty: 30 TABLET | Refills: 4 | Status: SHIPPED | OUTPATIENT
Start: 2024-01-17 | End: 2024-05-28

## 2024-01-16 NOTE — TELEPHONE ENCOUNTER
Daughter advised of MD task.  Rx sent to pharmacy.    Fior Alvarado RN    ----- Message from Anthony Mcdaniel MD sent at 1/12/2024  5:51 PM EST -----  Thank you!    Fior- I am ok with him coming off of his AC. He should be on aspirin and plavix until 6 months  ----- Message -----  From: Anthony Alejo MD  Sent: 1/12/2024   4:09 PM EST  To: Anthony Mcdaniel MD    I did this patient's post-Watchman FARHAD today. I had a hard time getting good images of the device but didn't see any flow and the position looked very similar to the intra-op FARHAD.     Velasquez

## 2024-01-25 ENCOUNTER — APPOINTMENT (OUTPATIENT)
Dept: GENERAL RADIOLOGY | Facility: HOSPITAL | Age: 89
End: 2024-01-25
Payer: MEDICARE

## 2024-01-25 ENCOUNTER — HOSPITAL ENCOUNTER (INPATIENT)
Facility: HOSPITAL | Age: 89
LOS: 5 days | Discharge: HOME | End: 2024-01-30
Attending: EMERGENCY MEDICINE | Admitting: INTERNAL MEDICINE
Payer: MEDICARE

## 2024-01-25 DIAGNOSIS — R94.30 LOW LEFT VENTRICULAR EJECTION FRACTION: ICD-10-CM

## 2024-01-25 DIAGNOSIS — N30.00 ACUTE CYSTITIS WITHOUT HEMATURIA: ICD-10-CM

## 2024-01-25 DIAGNOSIS — I10 ESSENTIAL HYPERTENSION: Chronic | ICD-10-CM

## 2024-01-25 DIAGNOSIS — A41.9 SEPSIS, DUE TO UNSPECIFIED ORGANISM, UNSPECIFIED WHETHER ACUTE ORGAN DYSFUNCTION PRESENT: Primary | ICD-10-CM

## 2024-01-25 DIAGNOSIS — I25.119 CORONARY ARTERY DISEASE INVOLVING NATIVE CORONARY ARTERY OF NATIVE HEART WITH ANGINA PECTORIS: ICD-10-CM

## 2024-01-25 DIAGNOSIS — G89.29 CHRONIC BILATERAL LOW BACK PAIN WITHOUT SCIATICA: ICD-10-CM

## 2024-01-25 DIAGNOSIS — M54.50 CHRONIC BILATERAL LOW BACK PAIN WITHOUT SCIATICA: ICD-10-CM

## 2024-01-25 DIAGNOSIS — C34.32 MALIGNANT NEOPLASM OF LOWER LOBE OF LEFT LUNG: ICD-10-CM

## 2024-01-25 DIAGNOSIS — I48.19 PERSISTENT ATRIAL FIBRILLATION: ICD-10-CM

## 2024-01-25 LAB
ALBUMIN SERPL-MCNC: 3.4 G/DL (ref 3.5–5.2)
ALBUMIN/GLOB SERPL: 0.9 G/DL
ALP SERPL-CCNC: 240 U/L (ref 39–117)
ALT SERPL W P-5'-P-CCNC: 38 U/L (ref 1–41)
ANION GAP SERPL CALCULATED.3IONS-SCNC: 13 MMOL/L (ref 5–15)
AST SERPL-CCNC: 41 U/L (ref 1–40)
BACTERIA UR QL AUTO: ABNORMAL /HPF
BASOPHILS # BLD AUTO: 0.07 10*3/MM3 (ref 0–0.2)
BASOPHILS NFR BLD AUTO: 0.9 % (ref 0–1.5)
BILIRUB SERPL-MCNC: 0.6 MG/DL (ref 0–1.2)
BILIRUB UR QL STRIP: NEGATIVE
BUN SERPL-MCNC: 52 MG/DL (ref 8–23)
BUN/CREAT SERPL: 43 (ref 7–25)
CALCIUM SPEC-SCNC: 9.3 MG/DL (ref 8.6–10.5)
CHLORIDE SERPL-SCNC: 96 MMOL/L (ref 98–107)
CLARITY UR: ABNORMAL
CO2 SERPL-SCNC: 27 MMOL/L (ref 22–29)
COLOR UR: YELLOW
CREAT SERPL-MCNC: 1.21 MG/DL (ref 0.76–1.27)
D-LACTATE SERPL-SCNC: 2.2 MMOL/L (ref 0.5–2)
D-LACTATE SERPL-SCNC: 2.9 MMOL/L (ref 0.5–2)
DEPRECATED RDW RBC AUTO: 49.3 FL (ref 37–54)
EGFRCR SERPLBLD CKD-EPI 2021: 57.9 ML/MIN/1.73
EOSINOPHIL # BLD AUTO: 0.27 10*3/MM3 (ref 0–0.4)
EOSINOPHIL NFR BLD AUTO: 3.4 % (ref 0.3–6.2)
ERYTHROCYTE [DISTWIDTH] IN BLOOD BY AUTOMATED COUNT: 13.7 % (ref 12.3–15.4)
GEN 5 2HR TROPONIN T REFLEX: 42 NG/L
GLOBULIN UR ELPH-MCNC: 3.9 GM/DL
GLUCOSE SERPL-MCNC: 138 MG/DL (ref 65–99)
GLUCOSE UR STRIP-MCNC: NEGATIVE MG/DL
HCT VFR BLD AUTO: 39.4 % (ref 37.5–51)
HGB BLD-MCNC: 13 G/DL (ref 13–17.7)
HGB UR QL STRIP.AUTO: NEGATIVE
HOLD SPECIMEN: NORMAL
HYALINE CASTS UR QL AUTO: ABNORMAL /LPF
IMM GRANULOCYTES # BLD AUTO: 0.05 10*3/MM3 (ref 0–0.05)
IMM GRANULOCYTES NFR BLD AUTO: 0.6 % (ref 0–0.5)
KETONES UR QL STRIP: NEGATIVE
LEUKOCYTE ESTERASE UR QL STRIP.AUTO: ABNORMAL
LYMPHOCYTES # BLD AUTO: 1.46 10*3/MM3 (ref 0.7–3.1)
LYMPHOCYTES NFR BLD AUTO: 18.3 % (ref 19.6–45.3)
MAGNESIUM SERPL-MCNC: 1.7 MG/DL (ref 1.6–2.4)
MCH RBC QN AUTO: 32.3 PG (ref 26.6–33)
MCHC RBC AUTO-ENTMCNC: 33 G/DL (ref 31.5–35.7)
MCV RBC AUTO: 98 FL (ref 79–97)
MONOCYTES # BLD AUTO: 0.46 10*3/MM3 (ref 0.1–0.9)
MONOCYTES NFR BLD AUTO: 5.8 % (ref 5–12)
NEUTROPHILS NFR BLD AUTO: 5.65 10*3/MM3 (ref 1.7–7)
NEUTROPHILS NFR BLD AUTO: 71 % (ref 42.7–76)
NITRITE UR QL STRIP: POSITIVE
NRBC BLD AUTO-RTO: 0 /100 WBC (ref 0–0.2)
NT-PROBNP SERPL-MCNC: 1428 PG/ML (ref 0–1800)
PH UR STRIP.AUTO: 5.5 [PH] (ref 5–8)
PLATELET # BLD AUTO: 243 10*3/MM3 (ref 140–450)
PMV BLD AUTO: 10.4 FL (ref 6–12)
POTASSIUM SERPL-SCNC: 4.6 MMOL/L (ref 3.5–5.2)
PROCALCITONIN SERPL-MCNC: 0.04 NG/ML (ref 0–0.25)
PROT SERPL-MCNC: 7.3 G/DL (ref 6–8.5)
PROT UR QL STRIP: NEGATIVE
RBC # BLD AUTO: 4.02 10*6/MM3 (ref 4.14–5.8)
RBC # UR STRIP: ABNORMAL /HPF
REF LAB TEST METHOD: ABNORMAL
SODIUM SERPL-SCNC: 136 MMOL/L (ref 136–145)
SP GR UR STRIP: 1.02 (ref 1–1.03)
SQUAMOUS #/AREA URNS HPF: ABNORMAL /HPF
TROPONIN T DELTA: 0 NG/L
TROPONIN T SERPL HS-MCNC: 42 NG/L
UROBILINOGEN UR QL STRIP: ABNORMAL
WBC # UR STRIP: ABNORMAL /HPF
WBC NRBC COR # BLD AUTO: 7.96 10*3/MM3 (ref 3.4–10.8)
WHOLE BLOOD HOLD COAG: NORMAL
WHOLE BLOOD HOLD SPECIMEN: NORMAL

## 2024-01-25 PROCEDURE — 80053 COMPREHEN METABOLIC PANEL: CPT | Performed by: PHYSICIAN ASSISTANT

## 2024-01-25 PROCEDURE — 81001 URINALYSIS AUTO W/SCOPE: CPT | Performed by: PHYSICIAN ASSISTANT

## 2024-01-25 PROCEDURE — 85025 COMPLETE CBC W/AUTO DIFF WBC: CPT | Performed by: PHYSICIAN ASSISTANT

## 2024-01-25 PROCEDURE — 71045 X-RAY EXAM CHEST 1 VIEW: CPT

## 2024-01-25 PROCEDURE — 99291 CRITICAL CARE FIRST HOUR: CPT

## 2024-01-25 PROCEDURE — 83605 ASSAY OF LACTIC ACID: CPT | Performed by: PHYSICIAN ASSISTANT

## 2024-01-25 PROCEDURE — 83880 ASSAY OF NATRIURETIC PEPTIDE: CPT | Performed by: PHYSICIAN ASSISTANT

## 2024-01-25 PROCEDURE — 84484 ASSAY OF TROPONIN QUANT: CPT | Performed by: PHYSICIAN ASSISTANT

## 2024-01-25 PROCEDURE — 36415 COLL VENOUS BLD VENIPUNCTURE: CPT

## 2024-01-25 PROCEDURE — 25010000002 HEPARIN (PORCINE) PER 1000 UNITS: Performed by: STUDENT IN AN ORGANIZED HEALTH CARE EDUCATION/TRAINING PROGRAM

## 2024-01-25 PROCEDURE — 25810000003 SODIUM CHLORIDE 0.9 % SOLUTION: Performed by: PHYSICIAN ASSISTANT

## 2024-01-25 PROCEDURE — 83735 ASSAY OF MAGNESIUM: CPT | Performed by: PHYSICIAN ASSISTANT

## 2024-01-25 PROCEDURE — 93005 ELECTROCARDIOGRAM TRACING: CPT | Performed by: PHYSICIAN ASSISTANT

## 2024-01-25 PROCEDURE — 87147 CULTURE TYPE IMMUNOLOGIC: CPT | Performed by: EMERGENCY MEDICINE

## 2024-01-25 PROCEDURE — 99223 1ST HOSP IP/OBS HIGH 75: CPT | Performed by: STUDENT IN AN ORGANIZED HEALTH CARE EDUCATION/TRAINING PROGRAM

## 2024-01-25 PROCEDURE — 25010000002 MEROPENEM PER 100 MG: Performed by: PHYSICIAN ASSISTANT

## 2024-01-25 PROCEDURE — 87154 CUL TYP ID BLD PTHGN 6+ TRGT: CPT | Performed by: EMERGENCY MEDICINE

## 2024-01-25 PROCEDURE — 87040 BLOOD CULTURE FOR BACTERIA: CPT | Performed by: EMERGENCY MEDICINE

## 2024-01-25 PROCEDURE — 93005 ELECTROCARDIOGRAM TRACING: CPT | Performed by: EMERGENCY MEDICINE

## 2024-01-25 PROCEDURE — 85652 RBC SED RATE AUTOMATED: CPT | Performed by: STUDENT IN AN ORGANIZED HEALTH CARE EDUCATION/TRAINING PROGRAM

## 2024-01-25 PROCEDURE — 87150 DNA/RNA AMPLIFIED PROBE: CPT | Performed by: EMERGENCY MEDICINE

## 2024-01-25 PROCEDURE — 86140 C-REACTIVE PROTEIN: CPT | Performed by: STUDENT IN AN ORGANIZED HEALTH CARE EDUCATION/TRAINING PROGRAM

## 2024-01-25 PROCEDURE — 84145 PROCALCITONIN (PCT): CPT | Performed by: PHYSICIAN ASSISTANT

## 2024-01-25 PROCEDURE — 93005 ELECTROCARDIOGRAM TRACING: CPT

## 2024-01-25 PROCEDURE — 87086 URINE CULTURE/COLONY COUNT: CPT | Performed by: PHYSICIAN ASSISTANT

## 2024-01-25 PROCEDURE — 87077 CULTURE AEROBIC IDENTIFY: CPT | Performed by: PHYSICIAN ASSISTANT

## 2024-01-25 PROCEDURE — 87186 SC STD MICRODIL/AGAR DIL: CPT | Performed by: PHYSICIAN ASSISTANT

## 2024-01-25 PROCEDURE — 93010 ELECTROCARDIOGRAM REPORT: CPT | Performed by: INTERNAL MEDICINE

## 2024-01-25 RX ORDER — L.ACID,PARA/B.BIFIDUM/S.THERM 8B CELL
1 CAPSULE ORAL DAILY
Status: DISCONTINUED | OUTPATIENT
Start: 2024-01-26 | End: 2024-01-30 | Stop reason: HOSPADM

## 2024-01-25 RX ORDER — METOLAZONE 2.5 MG/1
2.5 TABLET ORAL EVERY OTHER DAY
Status: DISCONTINUED | OUTPATIENT
Start: 2024-01-25 | End: 2024-01-26

## 2024-01-25 RX ORDER — TAMSULOSIN HYDROCHLORIDE 0.4 MG/1
0.4 CAPSULE ORAL DAILY
Status: DISCONTINUED | OUTPATIENT
Start: 2024-01-26 | End: 2024-01-30 | Stop reason: HOSPADM

## 2024-01-25 RX ORDER — AMOXICILLIN 250 MG
2 CAPSULE ORAL 2 TIMES DAILY
Status: DISCONTINUED | OUTPATIENT
Start: 2024-01-25 | End: 2024-01-30 | Stop reason: HOSPADM

## 2024-01-25 RX ORDER — ASPIRIN 81 MG/1
81 TABLET ORAL DAILY
Status: DISCONTINUED | OUTPATIENT
Start: 2024-01-26 | End: 2024-01-30 | Stop reason: HOSPADM

## 2024-01-25 RX ORDER — FINASTERIDE 5 MG/1
5 TABLET, FILM COATED ORAL NIGHTLY
Status: DISCONTINUED | OUTPATIENT
Start: 2024-01-25 | End: 2024-01-30 | Stop reason: HOSPADM

## 2024-01-25 RX ORDER — BISACODYL 5 MG/1
5 TABLET, DELAYED RELEASE ORAL DAILY PRN
Status: DISCONTINUED | OUTPATIENT
Start: 2024-01-25 | End: 2024-01-30 | Stop reason: HOSPADM

## 2024-01-25 RX ORDER — CLOPIDOGREL BISULFATE 75 MG/1
75 TABLET ORAL DAILY
Status: DISCONTINUED | OUTPATIENT
Start: 2024-01-26 | End: 2024-01-30 | Stop reason: HOSPADM

## 2024-01-25 RX ORDER — VALSARTAN 80 MG/1
40 TABLET ORAL DAILY
Status: DISCONTINUED | OUTPATIENT
Start: 2024-01-26 | End: 2024-01-26

## 2024-01-25 RX ORDER — PRAVASTATIN SODIUM 40 MG
40 TABLET ORAL NIGHTLY
Status: DISCONTINUED | OUTPATIENT
Start: 2024-01-25 | End: 2024-01-30 | Stop reason: HOSPADM

## 2024-01-25 RX ORDER — SODIUM CHLORIDE 9 MG/ML
40 INJECTION, SOLUTION INTRAVENOUS AS NEEDED
Status: DISCONTINUED | OUTPATIENT
Start: 2024-01-25 | End: 2024-01-30 | Stop reason: HOSPADM

## 2024-01-25 RX ORDER — SODIUM CHLORIDE 0.9 % (FLUSH) 0.9 %
10 SYRINGE (ML) INJECTION AS NEEDED
Status: DISCONTINUED | OUTPATIENT
Start: 2024-01-25 | End: 2024-01-30 | Stop reason: HOSPADM

## 2024-01-25 RX ORDER — ALLOPURINOL 300 MG/1
300 TABLET ORAL DAILY
Status: DISCONTINUED | OUTPATIENT
Start: 2024-01-26 | End: 2024-01-30 | Stop reason: HOSPADM

## 2024-01-25 RX ORDER — DONEPEZIL HYDROCHLORIDE 10 MG/1
10 TABLET, FILM COATED ORAL NIGHTLY
Status: DISCONTINUED | OUTPATIENT
Start: 2024-01-25 | End: 2024-01-30 | Stop reason: HOSPADM

## 2024-01-25 RX ORDER — POLYETHYLENE GLYCOL 3350 17 G/17G
17 POWDER, FOR SOLUTION ORAL DAILY PRN
Status: DISCONTINUED | OUTPATIENT
Start: 2024-01-25 | End: 2024-01-30 | Stop reason: HOSPADM

## 2024-01-25 RX ORDER — BISACODYL 10 MG
10 SUPPOSITORY, RECTAL RECTAL DAILY PRN
Status: DISCONTINUED | OUTPATIENT
Start: 2024-01-25 | End: 2024-01-30 | Stop reason: HOSPADM

## 2024-01-25 RX ORDER — MULTIPLE VITAMINS W/ MINERALS TAB 9MG-400MCG
1 TAB ORAL NIGHTLY
Status: DISCONTINUED | OUTPATIENT
Start: 2024-01-25 | End: 2024-01-30 | Stop reason: HOSPADM

## 2024-01-25 RX ORDER — ALBUTEROL SULFATE 2.5 MG/3ML
2.5 SOLUTION RESPIRATORY (INHALATION) EVERY 6 HOURS PRN
Status: DISCONTINUED | OUTPATIENT
Start: 2024-01-25 | End: 2024-01-30 | Stop reason: HOSPADM

## 2024-01-25 RX ORDER — PANTOPRAZOLE SODIUM 40 MG/1
40 TABLET, DELAYED RELEASE ORAL
Status: DISCONTINUED | OUTPATIENT
Start: 2024-01-26 | End: 2024-01-30 | Stop reason: HOSPADM

## 2024-01-25 RX ORDER — SODIUM CHLORIDE 0.9 % (FLUSH) 0.9 %
10 SYRINGE (ML) INJECTION EVERY 12 HOURS SCHEDULED
Status: DISCONTINUED | OUTPATIENT
Start: 2024-01-25 | End: 2024-01-30 | Stop reason: HOSPADM

## 2024-01-25 RX ORDER — ACETAMINOPHEN 325 MG/1
650 TABLET ORAL EVERY 4 HOURS PRN
Status: DISCONTINUED | OUTPATIENT
Start: 2024-01-25 | End: 2024-01-30 | Stop reason: HOSPADM

## 2024-01-25 RX ORDER — HEPARIN SODIUM 5000 [USP'U]/ML
5000 INJECTION, SOLUTION INTRAVENOUS; SUBCUTANEOUS EVERY 8 HOURS SCHEDULED
Status: DISCONTINUED | OUTPATIENT
Start: 2024-01-25 | End: 2024-01-30 | Stop reason: HOSPADM

## 2024-01-25 RX ORDER — FUROSEMIDE 20 MG/1
20 TABLET ORAL 2 TIMES DAILY
Status: DISCONTINUED | OUTPATIENT
Start: 2024-01-25 | End: 2024-01-26

## 2024-01-25 RX ORDER — MEMANTINE HYDROCHLORIDE 10 MG/1
10 TABLET ORAL 2 TIMES DAILY
Status: DISCONTINUED | OUTPATIENT
Start: 2024-01-25 | End: 2024-01-30 | Stop reason: HOSPADM

## 2024-01-25 RX ADMIN — MEMANTINE 10 MG: 10 TABLET ORAL at 22:50

## 2024-01-25 RX ADMIN — SODIUM CHLORIDE 500 ML: 9 INJECTION, SOLUTION INTRAVENOUS at 15:54

## 2024-01-25 RX ADMIN — FINASTERIDE 5 MG: 5 TABLET, FILM COATED ORAL at 22:49

## 2024-01-25 RX ADMIN — PRAVASTATIN SODIUM 40 MG: 40 TABLET ORAL at 22:49

## 2024-01-25 RX ADMIN — DONEPEZIL HYDROCHLORIDE 10 MG: 10 TABLET, FILM COATED ORAL at 22:50

## 2024-01-25 RX ADMIN — SODIUM CHLORIDE 1000 ML: 9 INJECTION, SOLUTION INTRAVENOUS at 20:15

## 2024-01-25 RX ADMIN — SENNOSIDES AND DOCUSATE SODIUM 2 TABLET: 8.6; 5 TABLET ORAL at 22:50

## 2024-01-25 RX ADMIN — MEROPENEM 1000 MG: 1 INJECTION, POWDER, FOR SOLUTION INTRAVENOUS at 20:16

## 2024-01-25 RX ADMIN — HEPARIN SODIUM 5000 UNITS: 5000 INJECTION INTRAVENOUS; SUBCUTANEOUS at 22:50

## 2024-01-25 RX ADMIN — METOPROLOL TARTRATE 25 MG: 25 TABLET, FILM COATED ORAL at 22:50

## 2024-01-25 RX ADMIN — Medication 1 TABLET: at 22:49

## 2024-01-25 NOTE — Clinical Note
Level of Care: Telemetry [5]   Diagnosis: Sepsis [[id number]]   Admitting Physician: MINGO EARLY [[id number]]   Attending Physician: MINGO EARLY [[id number]]

## 2024-01-25 NOTE — ED PROVIDER NOTES
Subjective  History of Present Illness:    Chief Complaint: Low blood pressure, dizziness, weakness  History of Present Illness: 88-year-old male presents with clinical past medical history of A-fib, chronic kidney disease, COPD, coronary disease, hypertension, hyperlipidemia, he states that he lives alone, he took his medication at 5 AM like he normally does, went back to bed, when he woke up later he felt weak, fatigued and dizzy especially with standing, he took his blood pressure realize that it was low, spoke to family, and came in for further valuation.  He had a recent Watchman implantation in November, Dr. Oliveros's his cardiologist, he also had a FARHAD performed this month, he was previously on Eliquis but is now on aspirin and Plavix after the FARHAD was performed.  Onset: Sudden onset  Duration: Symptoms started this morning  Exacerbating / Alleviating factors: Symptoms worse with standing or activity  Associated symptoms: Weakness      Nurses Notes reviewed and agree, including vitals, allergies, social history and prior medical history.     REVIEW OF SYSTEMS: All systems reviewed and not pertinent unless noted.    Review of Systems   Constitutional:  Positive for fatigue.   Neurological:  Positive for weakness.        Near passing out   All other systems reviewed and are negative.      Past Medical History:   Diagnosis Date    Arrhythmia     Atrial fibrillation     Bilateral leg cramps     possible new medication related side effects    Chronic kidney disease     Clotting disorder     pt doesnt know about this    COPD (chronic obstructive pulmonary disease)     Coronary artery disease     Diabetes mellitus     doesnt check sugar    E. coli sepsis 06/23/2022    Enlarged prostate without lower urinary tract symptoms (luts) 06/20/2016    Full dentures     GERD (gastroesophageal reflux disease)     Gout     Hearing aid worn     bilat prn    History of colonoscopy 09/12/2012    History of radiation therapy  2023    SBRT LLL lung    [place] (hard of hearing)     hearing aids prn    Hyperlipidemia     Hypertension     Kidney stone     surgery x1    Lung cancer     STEVEN on CPAP     compliant with machine    PAF (paroxysmal atrial fibrillation)     Prostatism     Sleep apnea     CPAP HS    Urinary incontinence     Urinary tract infection     Wears glasses     readers       Allergies:    Xarelto [rivaroxaban] and Penicillins      Past Surgical History:   Procedure Laterality Date    ATRIAL APPENDAGE EXCLUSION LEFT WITH TRANSESOPHAGEAL ECHOCARDIOGRAM N/A 2023    Procedure: Atrial Appendage Occlusion;  Surgeon: Anthony Mcdaniel MD;  Location:  [facility];  Service: Cardiovascular;  Laterality: N/A;    CARDIAC CATHETERIZATION Left 2022    Procedure: Left Heart Cath;  Surgeon: Titus Oliveros IV, MD;  Location:  [facility];  Service: Cardiovascular;  Laterality: Left;    CARDIOVERSION      CATARACT EXTRACTION Bilateral     COLONOSCOPY      CYSTOSCOPY      ENDOSCOPY      possible    KIDNEY STONE SURGERY      x1         Social History     Socioeconomic History    Marital status:    Tobacco Use    Smoking status: Former     Packs/day: 1.00     Years: 15.00     Additional pack years: 0.00     Total pack years: 15.00     Types: Cigarettes     Start date: 1947     Quit date: 1960     Years since quittin.7     Passive exposure: Past    Smokeless tobacco: Former     Types: Chew     Quit date:     Tobacco comments:     started at 13 yo.   Vaping Use    Vaping Use: Never used    Passive vaping exposure: Yes   Substance and Sexual Activity    Alcohol use: No    Drug use: Never    Sexual activity: Not Currently     Partners: Female         Family History   Problem Relation Age of Onset    Alzheimer's disease Mother     COPD Father     Lung cancer Father     Cancer Father     Kidney disease Father     No Known Problems Daughter     No Known Problems Daughter      "No Known Problems Daughter        Objective  Physical Exam:  /87 (BP Location: Left arm, Patient Position: Lying)   Pulse 90   Temp 98 °F (36.7 °C) (Oral)   Resp 16   Ht 190.5 cm (75\")   Wt 112 kg (248 lb)   SpO2 92%   BMI 31.00 kg/m²      Physical Exam  Vitals and nursing note reviewed.   Constitutional:       Appearance: He is well-developed.   HENT:      Head: Normocephalic and atraumatic.      Mouth/Throat:      Mouth: Mucous membranes are moist.   Eyes:      Extraocular Movements: Extraocular movements intact.   Cardiovascular:      Rate and Rhythm: Normal rate and regular rhythm.   Pulmonary:      Effort: Pulmonary effort is normal.      Breath sounds: Normal breath sounds.   Abdominal:      Palpations: Abdomen is soft.   Musculoskeletal:         General: Normal range of motion.      Cervical back: Normal range of motion.   Skin:     General: Skin is warm and dry.   Neurological:      Mental Status: He is alert and oriented to person, place, and time.      Motor: Weakness present.   Psychiatric:         Behavior: Behavior normal.         Thought Content: Thought content normal.         Judgment: Judgment normal.           Critical Care    Performed by: Last Calvin Jr., PA-C  Authorized by: Renny Hendrickson MD    Critical care provider statement:     Critical care time (minutes):  40    Critical care time was exclusive of:  Separately billable procedures and treating other patients and teaching time    Critical care was necessary to treat or prevent imminent or life-threatening deterioration of the following conditions: Sepsis, requiring multiple fluid boluses, and broad-spectrum antibiotics, admission to the hospital for further care.    Critical care was time spent personally by me on the following activities:  Blood draw for specimens, development of treatment plan with patient or surrogate, discussions with consultants, discussions with primary provider, evaluation of patient's " response to treatment, examination of patient, interpretation of cardiac output measurements, obtaining history from patient or surrogate, ordering and performing treatments and interventions, ordering and review of laboratory studies, pulse oximetry, ordering and review of radiographic studies, review of old charts and re-evaluation of patient's condition    Care discussed with: admitting provider        ED Course:    ED Course as of 01/25/24 2242   u Jan 25, 2024   1527 Jan 2024  Clinical Indication    Arrhythmia; AFib  Dx: Presence of Watchman left atrial appendage closure device [Z95.818 (ICD-10-CM)]; Atrial fibrillation, chronic [I48.20 (ICD-10-CM)]    Interpretation Summary       ·  Left ventricular systolic function is mildly decreased. Estimated left ventricular EF = 40%  ·  The left ventricular cavity is mildly dilated.  ·  The left atrial cavity is dilated.  ·  There is a 27mm Watchman device which appears well seated and well compressed. Images obtained with difficulty achieving good resolution of the borders but no periprosthetic flow was seen. No device related thrombus seen.  ·  No aortic valve regurgitation or stenosis is present. There is mild thickening of the aortic valve. The aortic valve appears trileaflet.  ·  There is mild, bileaflet mitral valve thickening present. Mild mitral valve regurgitation is present with a centrally-directed jet noted. No significant mitral valve stenosis is present.  ·  The tricuspid valve is normal in structure. Trace tricuspid valve regurgitation is present.  ·  Borderline dilation of the ascending aorta is present. Ascending aorta = 3.6 cm No dilation of the descending aorta present. There is moderate plaque in the descending aorta present.      [CS]   1527 Review of previous  non ED visits, prior labs, prior imaging, available notes from prior evaluations or visits with specialists, medication list, allergies, past medical history, past surgical history     [CS]    1652 Chest x-ray interpretation by me: No acute cardiopulmonary abnormality [CS]   1652 I Personally reviewed all laboratory studies performed in the emergency department  [CS]   1939 Patient states his symptoms are resolved, his daughter reports the last time he had hypotension he was found to have urinary tract infection, reviewed the records he was septic at that time, he states that his symptoms are more pronounced, according to records he received 3 L of normal saline at that time, he has received half a liter of normal saline while in the ED, he is still at the bedside and did not feel any weakness or dizziness. [CS]   2000 Patient continues to have hypotension and has a positive urinalysis, based on previous cultures, I discussed with pharmacy, and my supervising physician Dr. Hendrickson, we decided to do meropenem, fluid bolus 30mg/kg and admit [CS]      ED Course User Index  [CS] Last Calvin Jr., PA-C       Lab Results (last 24 hours)       Procedure Component Value Units Date/Time    CBC & Differential [[id number]]  (Abnormal) Collected: 01/25/24 1552    Specimen: Blood Updated: 01/25/24 1606    Narrative:      The following orders were created for panel order CBC & Differential.  Procedure                               Abnormality         Status                     ---------                               -----------         ------                     CBC Auto Differential[[id number]]        Abnormal            Final result                 Please view results for these tests on the individual orders.    Comprehensive Metabolic Panel [[id number]]  (Abnormal) Collected: 01/25/24 1552    Specimen: Blood Updated: 01/25/24 1629     Glucose 138 mg/dL      BUN 52 mg/dL      Creatinine 1.21 mg/dL      Sodium 136 mmol/L      Potassium 4.6 mmol/L      Comment: Slight hemolysis detected by analyzer. Result may be falsely elevated.        Chloride 96 mmol/L      CO2 27.0 mmol/L      Calcium 9.3 mg/dL      Total Protein  7.3 g/dL      Albumin 3.4 g/dL      ALT (SGPT) 38 U/L      AST (SGOT) 41 U/L      Alkaline Phosphatase 240 U/L      Total Bilirubin 0.6 mg/dL      Globulin 3.9 gm/dL      Comment: Calculated Result        A/G Ratio 0.9 g/dL      BUN/Creatinine Ratio 43.0     Anion Gap 13.0 mmol/L      eGFR 57.9 mL/min/1.73     Narrative:      GFR Normal >60  Chronic Kidney Disease <60  Kidney Failure <15    The GFR formula is only valid for adults with stable renal function between ages 18 and 70.    High Sensitivity Troponin T [[id number]]  (Abnormal) Collected: 01/25/24 1552    Specimen: Blood Updated: 01/25/24 1629     HS Troponin T 42 ng/L     Narrative:      High Sensitive Troponin T Reference Range:  <14.0 ng/L- Negative Female for AMI  <22.0 ng/L- Negative Male for AMI  >=14 - Abnormal Female indicating possible myocardial injury.  >=22 - Abnormal Male indicating possible myocardial injury.   Clinicians would have to utilize clinical acumen, EKG, Troponin, and serial changes to determine if it is an Acute Myocardial Infarction or myocardial injury due to an underlying chronic condition.         CBC Auto Differential [[id number]]  (Abnormal) Collected: 01/25/24 1552    Specimen: Blood Updated: 01/25/24 1606     WBC 7.96 10*3/mm3      RBC 4.02 10*6/mm3      Hemoglobin 13.0 g/dL      Hematocrit 39.4 %      MCV 98.0 fL      MCH 32.3 pg      MCHC 33.0 g/dL      RDW 13.7 %      RDW-SD 49.3 fl      MPV 10.4 fL      Platelets 243 10*3/mm3      Neutrophil % 71.0 %      Lymphocyte % 18.3 %      Monocyte % 5.8 %      Eosinophil % 3.4 %      Basophil % 0.9 %      Immature Grans % 0.6 %      Neutrophils, Absolute 5.65 10*3/mm3      Lymphocytes, Absolute 1.46 10*3/mm3      Monocytes, Absolute 0.46 10*3/mm3      Eosinophils, Absolute 0.27 10*3/mm3      Basophils, Absolute 0.07 10*3/mm3      Immature Grans, Absolute 0.05 10*3/mm3      nRBC 0.0 /100 WBC     Magnesium [[id number]]  (Normal) Collected: 01/25/24 1552    Specimen: Blood Updated:  01/25/24 1629     Magnesium 1.7 mg/dL     BNP [[id number]]  (Normal) Collected: 01/25/24 1552    Specimen: Blood Updated: 01/25/24 1629     proBNP 1,428.0 pg/mL     Narrative:      This assay is used as an aid in the diagnosis of individuals suspected of having heart failure. It can be used as an aid in the diagnosis of acute decompensated heart failure (ADHF) in patients presenting with signs and symptoms of ADHF to the emergency department (ED). In addition, NT-proBNP of <300 pg/mL indicates ADHF is not likely.    Age Range Result Interpretation  NT-proBNP Concentration (pg/mL:      <50             Positive            >450                   Gray                 300-450                    Negative             <300    50-75           Positive            >900                  Gray                300-900                  Negative            <300      >75             Positive            >1800                  Gray                300-1800                  Negative            <300    Lactic Acid, Plasma [[id number]]  (Abnormal) Collected: 01/25/24 1552    Specimen: Blood Updated: 01/25/24 2013     Lactate 2.9 mmol/L      Comment: Falsely depressed results may occur on samples drawn from patients receiving N-Acetylcysteine (NAC) or Metamizole.       High Sensitivity Troponin T 2Hr [[id number]]  (Abnormal) Collected: 01/25/24 1813    Specimen: Blood from Arm, Left Updated: 01/25/24 1839     HS Troponin T 42 ng/L      Troponin T Delta 0 ng/L     Narrative:      High Sensitive Troponin T Reference Range:  <14.0 ng/L- Negative Female for AMI  <22.0 ng/L- Negative Male for AMI  >=14 - Abnormal Female indicating possible myocardial injury.  >=22 - Abnormal Male indicating possible myocardial injury.   Clinicians would have to utilize clinical acumen, EKG, Troponin, and serial changes to determine if it is an Acute Myocardial Infarction or myocardial injury due to an underlying chronic condition.         Procalcitonin [[id number]]   "(Normal) Collected: 01/25/24 1813    Specimen: Blood from Arm, Left Updated: 01/25/24 2014     Procalcitonin 0.04 ng/mL     Narrative:      As a Marker for Sepsis (Non-Neonates):    1. <0.5 ng/mL represents a low risk of severe sepsis and/or septic shock.  2. >2 ng/mL represents a high risk of severe sepsis and/or septic shock.    As a Marker for Lower Respiratory Tract Infections that require antibiotic therapy:    PCT on Admission    Antibiotic Therapy       6-12 Hrs later    >0.5                Strongly Recommended  >0.25 - <0.5        Recommended   0.1 - 0.25          Discouraged              Remeasure/reassess PCT  <0.1                Strongly Discouraged     Remeasure/reassess PCT    As 28 day mortality risk marker: \"Change in Procalcitonin Result\" (>80% or <=80%) if Day 0 (or Day 1) and Day 4 values are available. Refer to http://[email or web address]-pct-calculator.com    Change in PCT <=80%  A decrease of PCT levels below or equal to 80% defines a positive change in PCT test result representing a higher risk for 28-day all-cause mortality of patients diagnosed with severe sepsis for septic shock.    Change in PCT >80%  A decrease of PCT levels of more than 80% defines a negative change in PCT result representing a lower risk for 28-day all-cause mortality of patients diagnosed with severe sepsis or septic shock.       Urinalysis With Culture If Indicated - Urine, Clean Catch [[id number]]  (Abnormal) Collected: 01/25/24 1913    Specimen: Urine, Clean Catch Updated: 01/25/24 1940     Color, UA Yellow     Appearance, UA Cloudy     pH, UA 5.5     Specific Gravity, UA 1.016     Glucose, UA Negative     Ketones, UA Negative     Bilirubin, UA Negative     Blood, UA Negative     Protein, UA Negative     Leuk Esterase, UA Moderate (2+)     Nitrite, UA Positive     Urobilinogen, UA 0.2 E.U./dL    Narrative:      In absence of clinical symptoms, the presence of pyuria, bacteria, and/or nitrites on the urinalysis result does not " correlate with infection.    Urinalysis, Microscopic Only - Urine, Clean Catch [[id number]]  (Abnormal) Collected: 01/25/24 1913    Specimen: Urine, Clean Catch Updated: 01/25/24 1940     RBC, UA 0-2 /HPF      WBC, UA 21-50 /HPF      Bacteria, UA 4+ /HPF      Squamous Epithelial Cells, UA 3-6 /HPF      Hyaline Casts, UA 0-6 /LPF      Methodology Automated Microscopy    Urine Culture - Urine, Urine, Clean Catch [[id number]] Collected: 01/25/24 1913    Specimen: Urine, Clean Catch Updated: 01/25/24 1940    Blood Culture - Blood, Arm, Left [[id number]] Collected: 01/25/24 2014    Specimen: Blood from Arm, Left Updated: 01/25/24 2033    Blood Culture - Blood, Arm, Right [[id number]] Collected: 01/25/24 2014    Specimen: Blood from Arm, Right Updated: 01/25/24 2034    STAT Lactic Acid, Reflex [[id number]]  (Abnormal) Collected: 01/25/24 2032    Specimen: Blood Updated: 01/25/24 2055     Lactate 2.2 mmol/L      Comment: Falsely depressed results may occur on samples drawn from patients receiving N-Acetylcysteine (NAC) or Metamizole.                XR Chest 1 View    Result Date: 1/25/2024  XR CHEST 1 VW Date of Exam: 1/25/2024 3:34 PM EST Indication: Chest Pain Protocol Comparison: Chest radiograph and chest CT 1/3/2024 Findings: Cardiomediastinal silhouette is unchanged. Similar left hemidiaphragm with left basilar scarring/subsegmental atelectasis. No focal consolidation or overt pulmonary edema. No pleural effusion or pneumothorax. Osseous structures are unchanged.     Impression: Impression: No evidence of acute cardiopulmonary disease. Electronically Signed: Surya Lion MD  1/25/2024 3:52 PM EST  Workstation ID: [id number]        Medical Decision Making  Patient Presentation 88-year-old male presented with hypotension, weakness and dizziness on my initial assessment he was hypotensive he did have improvement with fluids transiently, but continued to have low blood pressure readings.    DDX hypotension, sepsis,  dehydration, urinary tract infection, acute kidney insufficiency    Data Review/ Non ED Records /Analysis/Ordering unique tests. Review of previous  non ED visits, prior labs, prior imaging, available notes from prior evaluations or visits with specialists, medication list, allergies, past medical history, past surgical history        Independent Review Studies. I Personally reviewed all laboratory studies performed in the emergency department     Intervention/Re-evaluation intervention included fluid boluses, broad-spectrum antibiotics on reevaluation patient remained stable    Independent Clinician discussed with my supervising physician Dr. Hendrickson, discussed with the on-call hospitalist Dr. Singh who accepted for admission    Risk Stratification tools/clinical decision rules patient presented with hypotension, found to be positive for urinary tract infection, he met sepsis criteria, significant concern for worsening infection, acute kidney insufficiency, renal failure, endorgan damage, disability or death therefore multiple fluid boluses and broad-spectrum antibiotics were started patient was admitted for further care    Shared Decision Making discussed this plan of care with patient and family and they agree    Disposition patient stable for admission    Problems Addressed:  Acute cystitis without hematuria: complicated acute illness or injury  Sepsis, due to unspecified organism, unspecified whether acute organ dysfunction present: complicated acute illness or injury    Amount and/or Complexity of Data Reviewed  Independent Historian: caregiver  External Data Reviewed: labs, radiology, ECG and notes.  Labs: ordered. Decision-making details documented in ED Course.  Radiology: ordered and independent interpretation performed. Decision-making details documented in ED Course.  ECG/medicine tests: ordered and independent interpretation performed. Decision-making details documented in ED  Course.    Risk  Prescription drug management.  Decision regarding hospitalization.          Final diagnoses:   Sepsis, due to unspecified organism, unspecified whether acute organ dysfunction present   Acute cystitis without hematuria          Last Calvin Jr., PA-LION  01/25/24 2943

## 2024-01-26 PROBLEM — I95.9 HYPOTENSION: Status: ACTIVE | Noted: 2024-01-26

## 2024-01-26 PROBLEM — R93.1 LOW LEFT VENTRICULAR EJECTION FRACTION: Status: ACTIVE | Noted: 2024-01-26

## 2024-01-26 PROBLEM — R94.30 LOW LEFT VENTRICULAR EJECTION FRACTION: Status: ACTIVE | Noted: 2024-01-26

## 2024-01-26 LAB
ANION GAP SERPL CALCULATED.3IONS-SCNC: 10 MMOL/L (ref 5–15)
BUN SERPL-MCNC: 49 MG/DL (ref 8–23)
BUN/CREAT SERPL: 43 (ref 7–25)
CALCIUM SPEC-SCNC: 8.9 MG/DL (ref 8.6–10.5)
CHLORIDE SERPL-SCNC: 99 MMOL/L (ref 98–107)
CO2 SERPL-SCNC: 28 MMOL/L (ref 22–29)
CREAT SERPL-MCNC: 1.14 MG/DL (ref 0.76–1.27)
CRP SERPL-MCNC: 1.18 MG/DL (ref 0–0.5)
D-LACTATE SERPL-SCNC: 1.9 MMOL/L (ref 0.5–2)
DEPRECATED RDW RBC AUTO: 48.7 FL (ref 37–54)
EGFRCR SERPLBLD CKD-EPI 2021: 62.2 ML/MIN/1.73
ERYTHROCYTE [DISTWIDTH] IN BLOOD BY AUTOMATED COUNT: 13.9 % (ref 12.3–15.4)
ERYTHROCYTE [SEDIMENTATION RATE] IN BLOOD: 124 MM/HR (ref 0–20)
GLUCOSE SERPL-MCNC: 178 MG/DL (ref 65–99)
HCT VFR BLD AUTO: 34.6 % (ref 37.5–51)
HGB BLD-MCNC: 11.3 G/DL (ref 13–17.7)
MAGNESIUM SERPL-MCNC: 1.7 MG/DL (ref 1.6–2.4)
MCH RBC QN AUTO: 31.6 PG (ref 26.6–33)
MCHC RBC AUTO-ENTMCNC: 32.7 G/DL (ref 31.5–35.7)
MCV RBC AUTO: 96.6 FL (ref 79–97)
PLATELET # BLD AUTO: 195 10*3/MM3 (ref 140–450)
PMV BLD AUTO: 9.8 FL (ref 6–12)
POTASSIUM SERPL-SCNC: 3.7 MMOL/L (ref 3.5–5.2)
QT INTERVAL: 362 MS
QTC INTERVAL: 478 MS
RBC # BLD AUTO: 3.58 10*6/MM3 (ref 4.14–5.8)
SODIUM SERPL-SCNC: 137 MMOL/L (ref 136–145)
T3FREE SERPL-MCNC: 2.62 PG/ML (ref 2–4.4)
WBC NRBC COR # BLD AUTO: 6.94 10*3/MM3 (ref 3.4–10.8)

## 2024-01-26 PROCEDURE — 97161 PT EVAL LOW COMPLEX 20 MIN: CPT

## 2024-01-26 PROCEDURE — 25010000002 DIGOXIN PER 500 MCG: Performed by: NURSE PRACTITIONER

## 2024-01-26 PROCEDURE — 84481 FREE ASSAY (FT-3): CPT | Performed by: NURSE PRACTITIONER

## 2024-01-26 PROCEDURE — 83735 ASSAY OF MAGNESIUM: CPT | Performed by: STUDENT IN AN ORGANIZED HEALTH CARE EDUCATION/TRAINING PROGRAM

## 2024-01-26 PROCEDURE — 80048 BASIC METABOLIC PNL TOTAL CA: CPT | Performed by: STUDENT IN AN ORGANIZED HEALTH CARE EDUCATION/TRAINING PROGRAM

## 2024-01-26 PROCEDURE — 94664 DEMO&/EVAL PT USE INHALER: CPT

## 2024-01-26 PROCEDURE — 25010000002 MEROPENEM PER 100 MG: Performed by: PHYSICIAN ASSISTANT

## 2024-01-26 PROCEDURE — 85027 COMPLETE CBC AUTOMATED: CPT | Performed by: STUDENT IN AN ORGANIZED HEALTH CARE EDUCATION/TRAINING PROGRAM

## 2024-01-26 PROCEDURE — 99222 1ST HOSP IP/OBS MODERATE 55: CPT | Performed by: INTERNAL MEDICINE

## 2024-01-26 PROCEDURE — 97110 THERAPEUTIC EXERCISES: CPT

## 2024-01-26 PROCEDURE — 25010000002 HEPARIN (PORCINE) PER 1000 UNITS: Performed by: STUDENT IN AN ORGANIZED HEALTH CARE EDUCATION/TRAINING PROGRAM

## 2024-01-26 PROCEDURE — 99232 SBSQ HOSP IP/OBS MODERATE 35: CPT | Performed by: FAMILY MEDICINE

## 2024-01-26 PROCEDURE — 25010000002 CEFTRIAXONE PER 250 MG: Performed by: STUDENT IN AN ORGANIZED HEALTH CARE EDUCATION/TRAINING PROGRAM

## 2024-01-26 PROCEDURE — 94799 UNLISTED PULMONARY SVC/PX: CPT

## 2024-01-26 RX ORDER — DIGOXIN 0.25 MG/ML
250 INJECTION INTRAMUSCULAR; INTRAVENOUS ONCE
Status: COMPLETED | OUTPATIENT
Start: 2024-01-26 | End: 2024-01-26

## 2024-01-26 RX ORDER — DIGOXIN 250 MCG
250 TABLET ORAL DAILY
Status: DISCONTINUED | OUTPATIENT
Start: 2024-01-26 | End: 2024-01-29

## 2024-01-26 RX ORDER — MIDODRINE HYDROCHLORIDE 5 MG/1
5 TABLET ORAL
Status: DISCONTINUED | OUTPATIENT
Start: 2024-01-26 | End: 2024-01-29

## 2024-01-26 RX ADMIN — DONEPEZIL HYDROCHLORIDE 10 MG: 10 TABLET, FILM COATED ORAL at 20:18

## 2024-01-26 RX ADMIN — Medication 10 ML: at 10:25

## 2024-01-26 RX ADMIN — MIDODRINE HYDROCHLORIDE 5 MG: 5 TABLET ORAL at 17:37

## 2024-01-26 RX ADMIN — METOPROLOL TARTRATE 12.5 MG: 25 TABLET, FILM COATED ORAL at 20:18

## 2024-01-26 RX ADMIN — HEPARIN SODIUM 5000 UNITS: 5000 INJECTION INTRAVENOUS; SUBCUTANEOUS at 20:19

## 2024-01-26 RX ADMIN — MIDODRINE HYDROCHLORIDE 5 MG: 5 TABLET ORAL at 12:55

## 2024-01-26 RX ADMIN — METOPROLOL TARTRATE 25 MG: 25 TABLET, FILM COATED ORAL at 10:23

## 2024-01-26 RX ADMIN — MEMANTINE 10 MG: 10 TABLET ORAL at 10:23

## 2024-01-26 RX ADMIN — HEPARIN SODIUM 5000 UNITS: 5000 INJECTION INTRAVENOUS; SUBCUTANEOUS at 06:13

## 2024-01-26 RX ADMIN — ASPIRIN 81 MG: 81 TABLET, COATED ORAL at 10:22

## 2024-01-26 RX ADMIN — ALLOPURINOL 300 MG: 300 TABLET ORAL at 10:22

## 2024-01-26 RX ADMIN — PANTOPRAZOLE SODIUM 40 MG: 40 TABLET, DELAYED RELEASE ORAL at 06:12

## 2024-01-26 RX ADMIN — MEROPENEM 1000 MG: 1 INJECTION, POWDER, FOR SOLUTION INTRAVENOUS at 14:57

## 2024-01-26 RX ADMIN — SERTRALINE HYDROCHLORIDE 50 MG: 50 TABLET ORAL at 10:24

## 2024-01-26 RX ADMIN — MEMANTINE 10 MG: 10 TABLET ORAL at 20:18

## 2024-01-26 RX ADMIN — TIOTROPIUM BROMIDE INHALATION SPRAY 2 PUFF: 3.12 SPRAY, METERED RESPIRATORY (INHALATION) at 07:53

## 2024-01-26 RX ADMIN — DIGOXIN 250 MCG: 0.25 INJECTION INTRAMUSCULAR; INTRAVENOUS at 12:54

## 2024-01-26 RX ADMIN — MEROPENEM 1000 MG: 1 INJECTION, POWDER, FOR SOLUTION INTRAVENOUS at 20:19

## 2024-01-26 RX ADMIN — Medication 1 CAPSULE: at 10:22

## 2024-01-26 RX ADMIN — HEPARIN SODIUM 5000 UNITS: 5000 INJECTION INTRAVENOUS; SUBCUTANEOUS at 14:57

## 2024-01-26 RX ADMIN — SENNOSIDES AND DOCUSATE SODIUM 2 TABLET: 8.6; 5 TABLET ORAL at 10:23

## 2024-01-26 RX ADMIN — Medication 1 TABLET: at 20:18

## 2024-01-26 RX ADMIN — CLOPIDOGREL BISULFATE 75 MG: 75 TABLET ORAL at 10:22

## 2024-01-26 RX ADMIN — DIGOXIN 250 MCG: 250 TABLET ORAL at 12:55

## 2024-01-26 RX ADMIN — Medication 10 ML: at 20:24

## 2024-01-26 RX ADMIN — PRAVASTATIN SODIUM 40 MG: 40 TABLET ORAL at 20:18

## 2024-01-26 RX ADMIN — TAMSULOSIN HYDROCHLORIDE 0.4 MG: 0.4 CAPSULE ORAL at 10:22

## 2024-01-26 RX ADMIN — CEFTRIAXONE 2000 MG: 2 INJECTION, POWDER, FOR SOLUTION INTRAMUSCULAR; INTRAVENOUS at 06:12

## 2024-01-26 RX ADMIN — FINASTERIDE 5 MG: 5 TABLET, FILM COATED ORAL at 20:18

## 2024-01-26 NOTE — CONSULTS
[facility]    INFECTIOUS DISEASE CONSULT/INITIAL HOSPITAL VISIT    Darien Camarena  1935  [id number]    Date of consult: 1/26/2024    Admit date: 1/25/2024    Requesting Provider: Anthony Franklin MD  Evaluating physician: Brandon Philippe MD  Reason for Consultation: Possible recurrent UTI with h/o ESBL  Chief Complaint: Symptoms similar to recent hospitalization on 1/5 with possible UTI.      Subjective   History of present illness:  Patient is a  88 y.o. male, known to Dr. Last Og, with h/o Afib/Watchman device, COPD, T2DM, CKD Stage III, STEVEN/CPAP, HTN, HLD, recurrent UTIs, ESBL, and BPH/urinary retention/self catheterization 4 times a day who was recently treated ESBL E.coli UTI with Invanz for 7 days.  He returned to [facility] ED on 1/25 for lightheadedness, fogginess, and warm sensation from sitting to standing for the past days PTA.  He denies dysuria or hematuria. On arrival, the patient is afebrile with , BP 81/62, and remains on room air with some hypoxia.  Initial labs were , lactic acid 2.9, proBNP 1428, WBC 8000 with 71% neutrophils, creatinine 1.21, CRP 1.15, and PCT 0.04.  A UA WBC was 21-50 with moderate LE and positive nitrite and culture positive for GNR. Blood cultures are pending.  A CXR showed no acute findings.  He is currently on Rocephin.  ID was asked on 1/26 to evaluate and manage his antibiotic therapy.  He has minimal complaints in terms of his urinary tract.  He is feeling better.    Past Medical History:   Diagnosis Date    Arrhythmia     Atrial fibrillation     Bilateral leg cramps     possible new medication related side effects    Chronic kidney disease     Clotting disorder     pt doesnt know about this    COPD (chronic obstructive pulmonary disease)     Coronary artery disease     Diabetes mellitus     doesnt check sugar    E. coli sepsis 06/23/2022    Enlarged prostate without lower urinary tract symptoms (luts) 06/20/2016     Full dentures     GERD (gastroesophageal reflux disease)     Gout     Hearing aid worn     bilat prn    History of colonoscopy 09/12/2012    History of radiation therapy 02/24/2023    SBRT LLL lung    [place] hearing[place]     hearing aids prn    Hyperlipidemia     Hypertension     Kidney stone     surgery x1    Lung cancer     STEVEN on CPAP     compliant with machine    PAF (paroxysmal atrial fibrillation)     Prostatism     Sleep apnea     CPAP HS    Urinary incontinence     Urinary tract infection     Wears glasses     readers       Past Surgical History:   Procedure Laterality Date    ATRIAL APPENDAGE EXCLUSION LEFT WITH TRANSESOPHAGEAL ECHOCARDIOGRAM N/A 11/28/2023    Procedure: Atrial Appendage Occlusion;  Surgeon: Anthony Mcdaniel MD;  Location:  [facility];  Service: Cardiovascular;  Laterality: N/A;    CARDIAC CATHETERIZATION Left 09/29/2022    Procedure: Left Heart Cath;  Surgeon: Titus Oliveros IV, MD;  Location:  [facility];  Service: Cardiovascular;  Laterality: Left;    CARDIOVERSION      CATARACT EXTRACTION Bilateral     COLONOSCOPY      CYSTOSCOPY      ENDOSCOPY      possible    KIDNEY STONE SURGERY      x1       Pediatric History   Patient Parents    Not on file     Other Topics Concern    Not on file   Social History Narrative    Caffeine: 1 cup of decaff coffee daily        family history includes Alzheimer's disease in his mother; COPD in his father; Cancer in his father; Kidney disease in his father; Lung cancer in his father; No Known Problems in his daughter, daughter, and daughter.    Allergies   Allergen Reactions    Xarelto [Rivaroxaban] GI Bleeding     GI bleed    Penicillins Hives     Has tolerated cefepime, ceftriaxone       Immunization History   Administered Date(s) Administered    31-influenza Vac Quardvalent Preservativ 11/01/2018, 10/16/2019    COVID-19 (PFIZER) BIVALENT 12+YRS 12/22/2022    COVID-19 (PFIZER) Purple Cap Monovalent 01/26/2021,  02/19/2021    Fluzone High Dose =>65 Years ([employer] ONLY) 10/01/2016, 09/18/2017, 09/15/2018, 10/12/2019, 09/18/2020, 09/25/2021    Fluzone High-Dose 65+yrs 09/19/2020, 09/25/2021, 12/22/2022, 10/30/2023    Hepatitis A 09/15/2018, 11/01/2018    Pneumococcal Conjugate 13-Valent (PCV13) 10/26/2015    Pneumococcal Polysaccharide (PPSV23) 06/24/2006, 09/18/2020    Pneumococcal, Unspecified 11/30/2006    Shingrix 09/25/2021, 04/15/2023    Td (TDVAX) 06/24/2005    Tdap 06/14/2018       Medication:  @Scheduled Meds:allopurinol, 300 mg, Oral, Daily  aspirin, 81 mg, Oral, Daily  cefTRIAXone, 2,000 mg, Intravenous, Q24H  clopidogrel, 75 mg, Oral, Daily  digoxin, 250 mcg, Intravenous, Once  digoxin, 250 mcg, Oral, Daily  donepezil, 10 mg, Oral, Nightly  finasteride, 5 mg, Oral, Nightly  heparin (porcine), 5,000 Units, Subcutaneous, Q8H  lactobacillus acidophilus, 1 capsule, Oral, Daily  memantine, 10 mg, Oral, BID  metoprolol tartrate, 12.5 mg, Oral, BID  midodrine, 5 mg, Oral, TID AC  multivitamin with minerals, 1 tablet, Oral, Nightly  pantoprazole, 40 mg, Oral, Q AM  pravastatin, 40 mg, Oral, Nightly  senna-docusate sodium, 2 tablet, Oral, BID  sertraline, 50 mg, Oral, Daily  sodium chloride, 10 mL, Intravenous, Q12H  tamsulosin, 0.4 mg, Oral, Daily  tiotropium bromide monohydrate, 2 puff, Inhalation, Daily - RT      Continuous Infusions:   PRN Meds:.  acetaminophen    albuterol    senna-docusate sodium **AND** polyethylene glycol **AND** bisacodyl **AND** bisacodyl    Calcium Replacement - Follow Nurse / BPA Driven Protocol    Magnesium Standard Dose Replacement - Follow Nurse / BPA Driven Protocol    Phosphorus Replacement - Follow Nurse / BPA Driven Protocol    Potassium Replacement - Follow Nurse / BPA Driven Protocol    sodium chloride    sodium chloride    sodium chloride     Please refer to the medical record for a full medication list    Review of Systems:    Constitutional-- No Fever, chills or sweats.  Appetite  "decreased, and some malaise. No fatigue.  HEENT-- No new vision, hearing or throat complaints.  No epistaxis or oral sores.  Denies odynophagia or dysphagia. No headache, photophobia or neck stiffness.  CV-- No chest pain, palpitation or syncope  Resp-- No SOB/cough/Hemoptysis  GI- No nausea, vomiting, or diarrhea.  No hematochezia, melena, or hematemesis. Denies jaundice or chronic liver disease.  -- No dysuria, hematuria, or flank pain.  Denies hesitancy, urgency or burning with urination.  Self caths.  Has some back pain prior to arrival, but better now.   Lymph- no swollen lymph nodes in neck/axilla or groin.   Heme- No active bruising or bleeding; no Hx of DVT or PE.  MS-- no swelling or pain in the bones or joints of arms/legs.  No new back pain.  Neuro-- No acute focal weakness or numbness in the arms or legs.  No seizures.  Some dizziness as per HPI.  Skin--No rashes or lesions    Physical Exam:   Vital Signs   Temp:  [97.8 °F (36.6 °C)-98.6 °F (37 °C)] 98.6 °F (37 °C)  Heart Rate:  [] 118  Resp:  [16-20] 20  BP: ()/(62-98) 82/62    Blood pressure (!) 82/62, pulse 118, temperature 98.6 °F (37 °C), temperature source Oral, resp. rate 20, height 190.5 cm (75\"), weight 112 kg (248 lb), SpO2 94%.  GENERAL: Awake and alert, in mild distress. Appears older than stated age.  Resting in chair.  HEENT:  Normocephalic, atraumatic.  Oropharynx without thrush. Dentition in fair repair. No cervical adenopathy. No neck masses.  Ears externally normal, Nose externally normal.  EYES: PERRL. No conjunctival injection. No icterus. EOM full.  LYMPHATICS: No lymphadenopathy of the neck or axillary or inguinal regions.   HEART: No murmur, gallop, or pericardial friction rub. Reg rate rhythm, No JVD at 45 degrees.  LUNGS: Clear to auscultation and percussion. No respiratory distress, no use of accessory muscles.  ABDOMEN: Soft, nontender, nondistended. No appreciable HSM.  Bowel sounds normal.  GENITAL: No external " "lesions, breasts without masses, back straight, no CVAT, rectal external without lesions.   SKIN: Warm and dry without cutaneous eruptions.  No nodules.    PSYCHIATRIC: Mental status lucid. No confusion.  EXT:  No cellulitic change.  NEURO: Oriented to name, CN 2 to 12 intact, DTR 1 + and symmetric, sensory intact to upper and lower extremities, motor 5/5 upper and lower extremity, cerebellar and gait not tested.      Results Review:   I reviewed the patient's new clinical results.  I reviewed the patient's new imaging results and agree with the interpretation.  I reviewed the patient's other test results and agree with the interpretation    Results from last 7 days   Lab Units 01/26/24  0413 01/25/24  1552   WBC 10*3/mm3 6.94 7.96   HEMOGLOBIN g/dL 11.3* 13.0   HEMATOCRIT % 34.6* 39.4   PLATELETS 10*3/mm3 195 243     Results from last 7 days   Lab Units 01/26/24  0413   SODIUM mmol/L 137   POTASSIUM mmol/L 3.7   CHLORIDE mmol/L 99   CO2 mmol/L 28.0   BUN mg/dL 49*   CREATININE mg/dL 1.14   GLUCOSE mg/dL 178*   CALCIUM mg/dL 8.9     Results from last 7 days   Lab Units 01/25/24  1552   ALK PHOS U/L 240*   BILIRUBIN mg/dL 0.6   ALT (SGPT) U/L 38   AST (SGOT) U/L 41*     Results from last 7 days   Lab Units 01/25/24  1552   SED RATE mm/hr 124*     Results from last 7 days   Lab Units 01/25/24  1813   CRP mg/dL 1.18*         Results from last 7 days   Lab Units 01/25/24  2329   LACTATE mmol/L 1.9     Estimated Creatinine Clearance: 61.7 mL/min (by C-G formula based on SCr of 1.14 mg/dL).     Procalitonin Results:      Lab 01/25/24  1813   PROCALCITONIN 0.04      Brief Urine Lab Results  (Last result in the past 365 days)        Color   Clarity   Blood   Leuk Est   Nitrite   Protein   CREAT   Urine HCG        01/25/24 1913 Yellow   Cloudy   Negative   Moderate (2+)   Positive   Negative                  No results found for: \"SITE\", \"ALLENTEST\", \"PHART\", \"PIC3FLN\", \"PO2ART\", \"VKK7JDJ\", \"BASEEXCESS\", \"Z5JAGFIK\", \"HGBBG\", " "\"HCTABG\", \"OXYHEMOGLOBI\", \"METHHGBN\", \"CARBOXYHGB\", \"CO2CT\", \"BAROMETRIC\", \"MODALITY\", \"FIO2\"     Microbiology:  Microbiology Results (last 10 days)       Procedure Component Value - Date/Time    Urine Culture - Urine, Urine, Clean Catch [[id number]]  (Abnormal) Collected: 01/25/24 1913    Lab Status: Preliminary result Specimen: Urine, Clean Catch Updated: 01/26/24 1142     Urine Culture >100,000 CFU/mL Gram Negative Bacilli    Narrative:      Colonization of the urinary tract without infection is common. Treatment is discouraged unless the patient is symptomatic, pregnant, or undergoing an invasive urologic procedure.                Radiology:  Imaging Results (Last 72 Hours)       Procedure Component Value Units Date/Time    XR Chest 1 View [[id number]] Collected: 01/25/24 1550     Updated: 01/25/24 1555    Narrative:      XR CHEST 1 VW    Date of Exam: 1/25/2024 3:34 PM EST    Indication: Chest Pain Protocol    Comparison: Chest radiograph and chest CT 1/3/2024    Findings:  Cardiomediastinal silhouette is unchanged. Similar left hemidiaphragm with left basilar scarring/subsegmental atelectasis. No focal consolidation or overt pulmonary edema. No pleural effusion or pneumothorax. Osseous structures are unchanged.      Impression:      Impression:  No evidence of acute cardiopulmonary disease.       Electronically Signed: Surya Lion MD    1/25/2024 3:52 PM EST    Workstation ID: [id number]            IMPRESSION:   Recurrent GNR acute bacterial cystitis with h/o ESBL, not surprising giving the recurrent self cath for urinary retention.  Benign prostatic hypertrophy with urinary retention/self catheterizations 4 times a day.  Increases risk for recurrent UTI.  Paroxysmal atrial fibrillation/Watchman procedure/Plavix  Type 2 diabetes mellitus  Chronic kidney disease Stage IIIa  Obesity  Essential hypertension  Obstructive sleep apnea/CPAP  Penicillin allergy (hives). Tolerated Rocephin in past.   Anemia, chronic " disease.      RECOMMENDATIONS:  Diagnostically, follow blood and urine cultures, physical examination, imaging, CBC, CMP, ESR, CRP, procalcitonin, lactic acid.  Therapeutically, Stop Rocephin and change to Merrem 1 GM IV Q8H pending cultures.  He will likely need about 7 days of antibiotics until 2/2/24.  Supportive care.  At risk for deconditioning.    I discussed the patient's findings and my recommendations with patient, nursing staff, and primary care team    Thank you for asking me to see Darien Isidro Camarena.  Our group would be pleased to follow this patient over the course of their hospitalization and assist with outpatient antimicrobial therapy, as indicated.  Further recommendations depend on the results of the cultures and clinical course.    Brandon Philippe MD saw and examined patient, verified hx and PE, read all radiographic studies, reviewed labs and micro data, and formulated dx, plan for treatment and all medical decision making.      PRATIK RuffinC for Brandon Philippe MD.  See next on Monday, call sooner if needed.      BRIGETTE Ruffin  1/26/2024

## 2024-01-26 NOTE — CONSULTS
Inpatient Cardiology Consult  Consult performed by: Blanquita Ansari APRN  Consult ordered by: Anthony Franklin MD        [facility]  Cardiovascular Consultation Note    Reason for consultation: Hypotension     Patient is an 88-year-old gentleman with a history of persistent atrial fibrillation status post Watchman device, stage III chronic kidney disease, type 2 diabetes mellitus, recurrent UTI's with nephrostomy tube in the past, hypertension and hyperlipidemia who we are being asked to consult for assistance with medication adjustments after patient presented to [facility] yesterday with dizziness, and weakness and was found to be hypotensive.  Blood pressure on arrival was low at 81/62.  On admission there was concern for potential urosepsis as the patient had just been hospitalized and discharged earlier on 1/5 when he presented with similar symptoms and was found to have evidence of UTI for which infectious disease followed.  This admission his lactic acid was elevated at 2.9, but WBCs were within normal limits and procalcitonin was negative.  Urinalysis this admission shows ongoing UTI.  He has been treated with IV fluids and started on antibiotics.  CT angiogram was negative for pulmonary embolism and no acute CHF.  EKG showed atrial fibrillation at 105 bpm.  He is currently in atrial fibrillation with rates in the low 100s on telemetry.  His metoprolol dose has been decreased from 100 twice daily to 25 twice daily.  His valsartan, metolazone and Lasix are on hold.  Blood pressure low this morning at 82/62 and he has been started on midodrine at 5 mg 3 times daily by the hospitalist.  He is currently lying in bed breathing easy on room air.  He denies any chest pain or shortness of breath.  The patient failed sotalol therapy in the past he had a return of his atrial fibrillation in 2022.  He underwent external cardioversion that was successful restoring  normal sinus rhythm and started on amiodarone which kept him in normal sinus rhythm until June 2023.  Cardioversion was attempted at that time but unsuccessful.  He was ultimately referred to EP for his persistent atrial fibrillation as well as intolerance of Eliquis due to ongoing hematuria.  He was evaluated by Dr. Mcdaniel with EP and amiodarone was discontinued in September as it was felt he was asymptomatic with his atrial fibrillation and had previously had no cardiomyopathy and for concerns of amiodarone toxicityAssociate with long-term use.  Rate control strategy for his atrial fibrillation was chosen and metoprolol dosing was increased.  He ultimately underwent a Watchman device in November due to being intolerant to anticoagulation from recurrent hematuria.  A follow-up FARHAD reformed earlier this month showed well-seated device but suggested a reduced LVEF of 40%.  He did have a cardiac catheterization in September 2022 for shortness of breath and coronary angiography showed minimal CAD.  He has no signs or symptoms of heart failure and appears euvolemic.  He denies chest pain or shortness of breath.  His only complaint is of intermittent dizziness/lightheadedness and low blood pressure.  Orthostatics were positive this admission.  He remains hypotensive with a systolic in the 80s despite having received over a liter of fluid.      Cardiac risk factors: Advanced age, hyperlipidemia    Past medical and surgical history, social and family history reviewed in EMR.    REVIEW OF SYSTEMS:   H&P ROS reviewed and pertinent CV ROS as noted in HPI.         Vital Sign Min/Max for last 24 hours  Temp  Min: 97.8 °F (36.6 °C)  Max: 98.6 °F (37 °C)   BP  Min: 81/62  Max: 138/97   Pulse  Min: 23  Max: 125   Resp  Min: 16  Max: 20   SpO2  Min: 84 %  Max: 97 %   No data recorded      Intake/Output Summary (Last 24 hours) at 1/26/2024 1138  Last data filed at 1/26/2024 0830  Gross per 24 hour   Intake 1600 ml   Output 1300 ml    Net 300 ml           Constitutional:       General: Awake.      Appearance: Healthy appearance.   Neck:      Vascular: No JVD.   Pulmonary:      Effort: Pulmonary effort is normal.      Breath sounds: Normal breath sounds.   Cardiovascular:      Tachycardia present. Irregularly irregular rhythm.      Murmurs: There is no murmur.   Pulses:     Intact distal pulses.   Edema:     Peripheral edema absent.   Skin:     General: Skin is warm and dry.   Neurological:      Mental Status: Alert and oriented to person, place and time.   Psychiatric:         Behavior: Behavior is cooperative.          EK2024 A-fib with RVR at 105 bpm    Lab Review:   Labs reviewed in the electronic medical record.  Pertinent findings include:  Lab Results   Component Value Date    GLUCOSE 178 (H) 2024    BUN 49 (H) 2024    CREATININE 1.14 2024    EGFR 62.2 2024    BCR 43.0 (H) 2024    K 3.7 2024    CO2 28.0 2024    CALCIUM 8.9 2024    ALBUMIN 3.4 (L) 2024    BILITOT 0.6 2024    AST 41 (H) 2024    ALT 38 2024     Lab Results   Component Value Date    WBC 6.94 2024    HGB 11.3 (L) 2024    HCT 34.6 (L) 2024    MCV 96.6 2024     2024     Lab Results   Component Value Date    CHOL 98 10/30/2023    TRIG 127 10/30/2023    HDL 34 (L) 10/30/2023    LDL 41 10/30/2023                Active Hospital Problems    Diagnosis     **Hypotension     Sepsis     Recurrent UTI     Stage 3 chronic kidney disease     Persistent atrial fibrillation      Diagnosed 2012.   FARHAD-guided ECV, 2012  CHADS-VASc 5 (age > 75, CAD, HTN, DM)  Successful external cardioversion for asymptomatic atrial fibrillation with RVR, 10/25/18  Successful cardioversion for atrial fibrillation RVR, 3/6/19.  Sotalol increased  Minimally symptomatic atrial fibrillation with cardioversion and the ER, 10/31/2019  ED cardioversion for A. fib with RVR, 2020  ED  cardioversion for asymptomatic A. fib with RVR, 12/27/2020   ED cardioversion for asymptomatic A. fib with RVR x 2  occasions, March 2021  Transition from sotalol to amiodarone, 4/14/2021  Successful FARHAD/ECV May 3, 2021  Successful cardioversion, 8/5/2022  Unsuccessful cardioversion 6/2023  Echo (8/8/2022): EF 50%, anatomically and functionally normal valves  Successful insertion of 27 mm Watchman device/left atrial pended occlusive device with Dr. Mcdaniel 11/28/2023  FARHAD (1/12/2024): LVEF = 40%.  27 mm Watchman device well-seated.  No thrombus or periprosthetic flow.  Mild MR but no significant valvular abnormality        Type 2 diabetes mellitus with stage 3 chronic kidney disease, without long-term current use of insulin     Low left ventricular ejection fraction     Presence of Watchman left atrial appendage closure device     Gastroesophageal reflux disease with esophagitis     Hyperlipidemia LDL goal <100      Moderate intensity statin therapy reasonable due to diabetic status      Essential hypertension      Target blood pressure <130/80 mmHg      Obstructive sleep apnea syndrome      Compliant with CPAP      Enlarged prostate without lower urinary tract symptoms (luts)                  Hypotension  In the setting of possible urosepsis  Continue to hold metolazone, Lasix and valsartan  Agree with midodrine 5 mg 3 times daily as started by hospitalist  Decrease metoprolol to tartrate 12.5 mg twice daily    Persistent atrial fibrillation/recent Watchman device  Currently in A-fib with rates not well-controlled  Has failed sotalol therapy in the past.  Evaluated by EP and amiodarone discontinued due to concerns of toxicity.  Metoprolol was increased to 100 twice daily for rate control as it was felt patient was asymptomatic with his A-fib   Watchman device placed 11/28 by Dr. Mcdaniel as patient was having recurrent hematuria and intolerant to anticoagulation  Defer NOAC due to Watchman device as recent FARHAD showed  "well-seated device  Continue aspirin and Plavix for Watchman device  Start digoxin 250 mcg daily for better rate control of atrial fibrillation  May need monitor at discharge to assess for rate control of atrial fibrillation    Hyperlipidemia  Pravastatin 40 mg daily    Reduced LVEF/systolic heart failure  FARHAD performed earlier this month showed a reduced LVEF of 40% which appears new  Could be tachycardic induced from rates not being well-controlled from A-fib as patient had minimal CAD noted on cath 2022   Currently appears euvolemic and compensated  Repeat limited echo for reevaluation of LVEF      UTI/recurrent in nature  Management per hospitalist  Blood cultures pending    BERYL Paiz  I discussed the case with the physician extender as well as the family and the patient.  The patient denies dyspnea with activities of daily living but does get short of breath if he does more than just walk around the house.  His daughter also notes occasional dyspnea.  The patient does not feel his heart racing.  He has had no chest pain.  He states before come to the hospital he would get lightheaded when he stood up.  His blood pressures been tenuous so all his medications have been held except metoprolol.  He got a dose of metoprolol last evening and today but his systolic was as low as 82.  He was started on midodrine therapy.  On physical exam neck has no JVP  Cardiovascular-tachycardic with no murmur and no edema to palpation  Respiratory-equal bilateral symmetrical expansion\" bilaterally  Lower extremities no edema  Neuro-facial expressions are symmetrical moves all 4 extremities  This time I recommend IV digoxin since his resting rate is fast.  Will also put him on p.o. digoxin since his blood pressure is tenuous.  If his blood pressure goes up with midodrine therapy we can add his beta-blockers back in but if his blood pressure remains tenuous we could add Corlanor for some heart rate control and could " also consider an AV prabhu ablation and pacemaker insertion if the above medical therapy fails

## 2024-01-26 NOTE — H&P
[facility] Medicine Services  HISTORY AND PHYSICAL    Patient Name: Darien Camarena  : 1935  MRN: [id number]  Primary Care Physician: Pito aWy MD  Date of admission: 2024      Subjective   Subjective     Chief Complaint:  Dizziness    HPI:  Darien Camarena is a 88 y.o. male with past medical history of A-fib status post Watchman device, COPD, type 2 diabetes well-controlled, CKD 3.  He is presenting with dizziness.  He had a recent admission, discharged  for similar symptoms.  He describes episodes of lightheadedness, fogginess, warmth sensation, especially when he is going from sitting to standing.  On the last admission he was found to be hypotensive and had evidence of UTI, was treated with carbapenems and saw ID outpatient to finish course on  as he has a history of resistant Klebsiella.  Cultures at that time did end up growing E. coli.  He felt well until today when he reported similar dizziness type symptoms that started this morning.  He is not having any fevers or chills, congestion.  He does not have any abdominal pain but was nauseous while he was getting prior antibiotics.  No sick contacts.  He does not report any dysuria or bladder discomfort.  He has BPH and he self catheterizes about 4 times a day.      Personal History     Past Medical History:   Diagnosis Date    Arrhythmia     Atrial fibrillation     Bilateral leg cramps     possible new medication related side effects    Chronic kidney disease     Clotting disorder     pt doesnt know about this    COPD (chronic obstructive pulmonary disease)     Coronary artery disease     Diabetes mellitus     doesnt check sugar    E. coli sepsis 2022    Enlarged prostate without lower urinary tract symptoms (luts) 2016    Full dentures     GERD (gastroesophageal reflux disease)     Gout     Hearing aid worn     bilat prn    History of colonoscopy 2012    History of radiation  therapy 02/24/2023    SBRT LLL lung    [place] (hard of hearing)     hearing aids prn    Hyperlipidemia     Hypertension     Kidney stone     surgery x1    Lung cancer     STEVEN on CPAP     compliant with machine    PAF (paroxysmal atrial fibrillation)     Prostatism     Sleep apnea     CPAP HS    Urinary incontinence     Urinary tract infection     Wears glasses     readers         Oncology Problem List:  Malignant neoplasm of lower lobe of left lung (01/30/2023; Status:   Active)    Oncology/Hematology History   Malignant neoplasm of lower lobe of left lung   1/30/2023 Initial Diagnosis    Malignant neoplasm of lower lobe of left lung (HCC)     2/14/2023 - 2/24/2023 Radiation    Radiation OncologyTreatment Course:  Darien Camarena received 5000 cGy in 5 fractions to left lung via External Beam Radiation - EBRT.       Past Surgical History:   Procedure Laterality Date    ATRIAL APPENDAGE EXCLUSION LEFT WITH TRANSESOPHAGEAL ECHOCARDIOGRAM N/A 11/28/2023    Procedure: Atrial Appendage Occlusion;  Surgeon: Anthony Mcdaniel MD;  Location:  [facility];  Service: Cardiovascular;  Laterality: N/A;    CARDIAC CATHETERIZATION Left 09/29/2022    Procedure: Left Heart Cath;  Surgeon: Titus Oliveros IV, MD;  Location:  [facility];  Service: Cardiovascular;  Laterality: Left;    CARDIOVERSION      CATARACT EXTRACTION Bilateral     COLONOSCOPY      CYSTOSCOPY      ENDOSCOPY      possible    KIDNEY STONE SURGERY      x1       Family History: family history includes Alzheimer's disease in his mother; COPD in his father; Cancer in his father; Kidney disease in his father; Lung cancer in his father; No Known Problems in his daughter, daughter, and daughter.     Social History:  reports that he quit smoking about 63 years ago. His smoking use included cigarettes. He started smoking about 77 years ago. He has a 15.00 pack-year smoking history. He has been exposed to tobacco smoke. He quit  smokeless tobacco use about 13 years ago.  His smokeless tobacco use included chew. He reports that he does not drink alcohol and does not use drugs.  Social History     Social History Narrative    Caffeine: 1 cup of decaff coffee daily        Medications:  Available home medication information reviewed.  Coenzyme Q10, Iron, Probiotic Product, albuterol sulfate HFA, allopurinol, ascorbic acid, aspirin, clopidogrel, docusate sodium, donepezil, finasteride, furosemide, memantine, metFORMIN, metOLazone, metoprolol tartrate, multivitamin with minerals, omeprazole, potassium chloride, pravastatin, sertraline, tamsulosin, tiotropium bromide-olodaterol, and valsartan    Allergies   Allergen Reactions    Xarelto [Rivaroxaban] GI Bleeding     GI bleed    Penicillins Hives     Has tolerated cefepime, ceftriaxone       Objective   Objective     Vital Signs:   Temp:  [97.9 °F (36.6 °C)-98 °F (36.7 °C)] 98 °F (36.7 °C)  Heart Rate:  [] 90  Resp:  [16-20] 16  BP: ()/(62-98) 131/87       Physical Exam   Awake alert and oriented x 3  Resting comfortably in bed  Mucous membranes are moist  Capillary refill less than 2 seconds  Heart rate irregular  Lungs are clear to auscultation bilaterally  No abdominal tenderness or distention, no CVA tenderness  1+ bilateral lower extremity edema    Result Review:  I have personally reviewed the results from the time of this admission to 1/25/2024 22:14 EST and agree with these findings:  [x]  Laboratory list / accordion  [x]  Microbiology  [x]  Radiology  [x]  EKG/Telemetry   []  Cardiology/Vascular   []  Pathology  [x]  Old records  []  Other:  Most notable findings include: See assessment and plan      LAB RESULTS:      Lab 01/25/24 2032 01/25/24  1813 01/25/24  1552   WBC  --   --  7.96   HEMOGLOBIN  --   --  13.0   HEMATOCRIT  --   --  39.4   PLATELETS  --   --  243   NEUTROS ABS  --   --  5.65   IMMATURE GRANS (ABS)  --   --  0.05   LYMPHS ABS  --   --  1.46   MONOS ABS  --    --  0.46   EOS ABS  --   --  0.27   MCV  --   --  98.0*   PROCALCITONIN  --  0.04  --    LACTATE 2.2*  --  2.9*         Lab 01/25/24  1552   SODIUM 136   POTASSIUM 4.6   CHLORIDE 96*   CO2 27.0   ANION GAP 13.0   BUN 52*   CREATININE 1.21   EGFR 57.9*   GLUCOSE 138*   CALCIUM 9.3   MAGNESIUM 1.7         Lab 01/25/24  1552   TOTAL PROTEIN 7.3   ALBUMIN 3.4*   GLOBULIN 3.9   ALT (SGPT) 38   AST (SGOT) 41*   BILIRUBIN 0.6   ALK PHOS 240*         Lab 01/25/24  1813 01/25/24  1552   PROBNP  --  1,428.0   HSTROP T 42* 42*                 UA          11/6/2023    12:11 1/3/2024    14:41 1/25/2024    19:13   Urinalysis   Squamous Epithelial Cells, UA 7-12  0-2  3-6    Specific Gravity, UA 1.014  1.012  1.016    Ketones, UA Negative  Negative  Negative    Blood, UA Trace  Negative  Negative    Leukocytes, UA Trace  Moderate (2+)  Moderate (2+)    Nitrite, UA Negative  Positive  Positive    RBC, UA 3-5  0-2  0-2    WBC, UA 6-10  21-50  21-50    Bacteria, UA None Seen  4+  4+        Microbiology Results (last 10 days)       ** No results found for the last 240 hours. **            XR Chest 1 View    Result Date: 1/25/2024  XR CHEST 1 VW Date of Exam: 1/25/2024 3:34 PM EST Indication: Chest Pain Protocol Comparison: Chest radiograph and chest CT 1/3/2024 Findings: Cardiomediastinal silhouette is unchanged. Similar left hemidiaphragm with left basilar scarring/subsegmental atelectasis. No focal consolidation or overt pulmonary edema. No pleural effusion or pneumothorax. Osseous structures are unchanged.     Impression: Impression: No evidence of acute cardiopulmonary disease. Electronically Signed: Surya Lion MD  1/25/2024 3:52 PM EST  Workstation ID: [id number]     Results for orders placed during the hospital encounter of 01/12/24    Adult Transesophageal Echo 3D (FARHAD) W/ Cont If Necessary Per Protocol    Interpretation Summary    Left ventricular systolic function is mildly decreased. Estimated left ventricular EF = 40%     The left ventricular cavity is mildly dilated.    The left atrial cavity is dilated.    There is a 27mm Watchman device which appears well seated and well compressed. Images obtained with difficulty achieving good resolution of the borders but no periprosthetic flow was seen. No device related thrombus seen.    No aortic valve regurgitation or stenosis is present. There is mild thickening of the aortic valve. The aortic valve appears trileaflet.    There is mild, bileaflet mitral valve thickening present. Mild mitral valve regurgitation is present with a centrally-directed jet noted. No significant mitral valve stenosis is present.    The tricuspid valve is normal in structure. Trace tricuspid valve regurgitation is present.    Borderline dilation of the ascending aorta is present. Ascending aorta = 3.6 cm No dilation of the descending aorta present. There is moderate plaque in the descending aorta present.      Assessment & Plan   Assessment & Plan       Sepsis    Type 2 diabetes mellitus with stage 3 chronic kidney disease, without long-term current use of insulin    Enlarged prostate without lower urinary tract symptoms (luts)    Gastroesophageal reflux disease with esophagitis    Hyperlipidemia LDL goal <100    Essential hypertension    Obstructive sleep apnea syndrome    Paroxysmal atrial fibrillation    Stage 3 chronic kidney disease    Suspected sepsis secondary to urinary tract infection  -Hypotensive and tachycardic on arrival to ED, improved following 1 L fluids.  Lactic acid mildly elevated.  White blood count is not elevated and procalcitonin is not elevated.  Received 1 dose of meropenem in the ED.  History of ESBL Klebsiella, underwent recent carbapenem treatment ending 1/8.  Cultures from that time growing E. coli with susceptibility to cephalosporins.  UA on admission with moderate leuk esterase, positive nitrites, white blood cells.  He does not report dysuria or discomfort but he self caths  -I am  not entirely convinced that this is an active infection, however given septic picture on admission we will continue antibiotics based on most recent culture and sensitivities.  Will change meropenem to ceftriaxone.  Will check inflammatory markers.  -ID consult in the morning    Hypotension  Congestive systolic heart failure without acute exacerbation  A-fib status post Watchman  -Initially tachycardic and hypotensive on presentation now improved  -Will reduce metoprolol from 100 twice daily to 25 twice daily and increase as tolerated  -Hold furosemide, losartan, and metolazone ordered and held  -Continue dual antiplatelet therapy and statin  -Check orthostatic vitals in the morning  -Cardiology consult in the morning for further advice regarding medication adjustment as this is his second episode of hypotension    CKD3   -GFR 57 on admission which is decreased from 78 prior, check am BMP following fluid repletion    COPD not in acute exacerbation  -Continue home inhaler equivalents    Enlarged prostate  -Continue home finasteride, continue home Flomax if blood pressure allows  -Patient self catheterizes 4 times a day    Well-controlled type 2 diabetes on oral meds  -Takes metformin at home, monitor sugars here, add corrective factor as needed    Dementia  -Continue Namenda and Aricept    Depression  -Continue home sertraline        DVT prophylaxis: Subcutaneous heparin  Medical DVT prophylaxis orders are present.          CODE STATUS:    Code Status and Medical Interventions:   Ordered at: 01/25/24 1481     Code Status (Patient has no pulse and is not breathing):    CPR (Attempt to Resuscitate)     Medical Interventions (Patient has pulse or is breathing):    Full Support       Expected Discharge   Expected discharge date/ time has not been documented.     Anthony Franklin MD  01/25/24

## 2024-01-26 NOTE — CASE MANAGEMENT/SOCIAL WORK
Discharge Planning Assessment  [facility]     Patient Name: Darien Camarena  MRN: [id number]  Today's Date: 1/26/2024    Admit Date: 1/25/2024    Plan: home   Discharge Needs Assessment       Row Name 01/26/24 1324       Living Environment    People in Home alone    Current Living Arrangements home    Potentially Unsafe Housing Conditions none    In the past 12 months has the electric, gas, oil, or water company threatened to shut off services in your home? No    Primary Care Provided by self    Provides Primary Care For no one    Family Caregiver if Needed none    Quality of Family Relationships involved;supportive    Able to Return to Prior Arrangements yes       Resource/Environmental Concerns    Resource/Environmental Concerns none    Transportation Concerns none       Transportation Needs    In the past 12 months, has lack of transportation kept you from medical appointments or from getting medications? no    In the past 12 months, has lack of transportation kept you from meetings, work, or from getting things needed for daily living? No       Food Insecurity    Within the past 12 months, you worried that your food would run out before you got the money to buy more. Never true    Within the past 12 months, the food you bought just didn't last and you didn't have money to get more. Never true       Transition Planning    Patient/Family Anticipates Transition to home    Transportation Anticipated family or friend will provide       Discharge Needs Assessment    Readmission Within the Last 30 Days no previous admission in last 30 days    Equipment Currently Used at Home cpap;cane, straight    Concerns to be Addressed discharge planning    Anticipated Changes Related to Illness none    Equipment Needed After Discharge rollator                   Discharge Plan       Row Name 01/26/24 0739       Plan    Plan home    Patient/Family in Agreement with Plan yes    Plan Comments Met with Mr. Felix at the bedside to  initiate discharge planning. He lives alone in a [facility]. He is independent with activities of daily living and uses a straight cane. He also has a raised toilet seat. His primary care provider is Dr. Pito Brito. He denies problems with accessing medical care or obtaining medications. He reported he has 3 daughters that assist with medical appointments, shopping, bill paying, and paperwork. He is not current with home health or outpatient services at this time.  also called daughter, Nikki, who confirmed information. Nikki reported she has been trying to help get patient a rollator for several weeks without luck.  agreed to assist with getting rollator prior to discharge.  will continue to follow plan of care and assist with discharge planning needs as indicated.    Final Discharge Disposition Code 01 - home or self-care                  Continued Care and Services - Admitted Since 1/25/2024    Coordination has not been started for this encounter.       Selected Continued Care - Prior Encounters Includes continued care and service providers with selected services from prior encounters from 10/27/2023 to 1/26/2024      Discharged on 1/5/2024 Admission date: 1/3/2024 - Discharge disposition: Home or Self Care      Dialysis/Infusion       Service Provider Selected Services Address Phone Fax Patient Preferred    [facility] [address], [facility] [id number] [phone] --                          Expected Discharge Date and Time       Expected Discharge Date Expected Discharge Time    Jan 27, 2024            Demographic Summary       Row Name 01/26/24 1320       General Information    Admission Type inpatient    Arrived From home    Referral Source admission list    Reason for Consult discharge planning    Preferred Language English       Contact Information    Contact Information  Comments SHAWN KWON Daughter [phone]            NEIL QUINTERO Daughter [phone]                         Migue Dolly Bray Daughter   [phone]                   Functional Status       Row Name 01/26/24 1323       Functional Status    Usual Activity Tolerance good    Current Activity Tolerance other (see comments)  conor       Physical Activity    On average, how many days per week do you engage in moderate to strenuous exercise (like a brisk walk)? 0 days    On average, how many minutes do you engage in exercise at this level? 0 min    Number of minutes of exercise per week 0       Assessment of Health Literacy    How often do you have someone help you read hospital materials? Sometimes    How often do you have problems learning about your medical condition because of difficulty understanding written information? Sometimes    How often do you have a problem understanding what is told to you about your medical condition? Sometimes    How confident are you filling out medical forms by yourself? A little bit    Health Literacy Moderate       Functional Status, IADL    Medications independent    Meal Preparation independent    Housekeeping assistive person    Laundry assistive person    Shopping assistive person       Mental Status    General Appearance WDL WDL       Mental Status Summary    Recent Changes in Mental Status/Cognitive Functioning no changes                   Psychosocial    No documentation.                  Abuse/Neglect    No documentation.                  Legal    No documentation.                  Substance Abuse    No documentation.                  Patient Forms    No documentation.                     Scarlet Martinez RN

## 2024-01-26 NOTE — PLAN OF CARE
Goal Outcome Evaluation:  Plan of Care Reviewed With: patient, daughter        Progress: no change  Outcome Evaluation: PT eval performed: Pt presenting with mild strength and mobility deficits.  Pt able to ambulate 150', SC, CGA with cues for posture and stability, mild bilateral knee buckling due to weakness.  Pt would do much better with a walker.  Pt requesting OP PT with [facility] at d/c.  Recommend home with assist and OP PT      Anticipated Discharge Disposition (PT): home with outpatient therapy services, home with assist

## 2024-01-26 NOTE — ED NOTES
Darien Camarena    Nursing Report ED to Floor:  Mental status: a&ox4  Ambulatory status: assist x1  Oxygen Therapy:  room air   Cardiac Rhythm: afib  Admitted from: home  Safety Concerns:  fall risk  Social Issues: n/a  ED Room #:  27    ED Nurse Phone Extension - [phone] or may call [phone].      HPI:   Chief Complaint   Patient presents with    Hypotension       Past Medical History:  Past Medical History:   Diagnosis Date    Arrhythmia     Atrial fibrillation     Bilateral leg cramps     possible new medication related side effects    Chronic kidney disease     Clotting disorder     pt doesnt know about this    COPD (chronic obstructive pulmonary disease)     Coronary artery disease     Diabetes mellitus     doesnt check sugar    E. coli sepsis 06/23/2022    Enlarged prostate without lower urinary tract symptoms (luts) 06/20/2016    Full dentures     GERD (gastroesophageal reflux disease)     Gout     Hearing aid worn     bilat prn    History of colonoscopy 09/12/2012    History of radiation therapy 02/24/2023    SBRT LLL lung    [place] (hard of hearing)     hearing aids prn    Hyperlipidemia     Hypertension     Kidney stone     surgery x1    Lung cancer     STEVEN on CPAP     compliant with machine    PAF (paroxysmal atrial fibrillation)     Prostatism     Sleep apnea     CPAP HS    Urinary incontinence     Urinary tract infection     Wears glasses     readers        Past Surgical History:  Past Surgical History:   Procedure Laterality Date    ATRIAL APPENDAGE EXCLUSION LEFT WITH TRANSESOPHAGEAL ECHOCARDIOGRAM N/A 11/28/2023    Procedure: Atrial Appendage Occlusion;  Surgeon: Anthony Mcdaniel MD;  Location:  [facility];  Service: Cardiovascular;  Laterality: N/A;    CARDIAC CATHETERIZATION Left 09/29/2022    Procedure: Left Heart Cath;  Surgeon: Titus Oliveros IV, MD;  Location:  [facility];  Service: Cardiovascular;  Laterality: Left;    CARDIOVERSION      CATARACT EXTRACTION  Bilateral     COLONOSCOPY      CYSTOSCOPY      ENDOSCOPY      possible    KIDNEY STONE SURGERY      x1        Admitting Doctor:   Anthony Franklin MD    Consulting Provider(s):  Consults       Date and Time Order Name Status Description    1/3/2024  6:08 PM Inpatient Infectious Diseases Consult Completed              Admitting Diagnosis:   The primary encounter diagnosis was Sepsis, due to unspecified organism, unspecified whether acute organ dysfunction present. A diagnosis of Acute cystitis without hematuria was also pertinent to this visit.    Most Recent Vitals:   Vitals:    01/25/24 1831 01/25/24 1846 01/25/24 1917 01/25/24 1932   BP: 105/74 111/73 116/88 109/81   BP Location:       Patient Position:       Pulse: 114 97 118 114   Resp:       Temp:       TempSrc:       SpO2: (!) 84% 94%  94%   Weight:       Height:           Active LDAs/IV Access:   Lines, Drains & Airways       Active LDAs       Name Placement date Placement time Site Days    Peripheral IV 01/25/24 1553 Posterior;Right Forearm 01/25/24  1553  Forearm  less than 1                    Labs (abnormal labs have a star):   Labs Reviewed   COMPREHENSIVE METABOLIC PANEL - Abnormal; Notable for the following components:       Result Value    Glucose 138 (*)     BUN 52 (*)     Chloride 96 (*)     Albumin 3.4 (*)     AST (SGOT) 41 (*)     Alkaline Phosphatase 240 (*)     BUN/Creatinine Ratio 43.0 (*)     eGFR 57.9 (*)     All other components within normal limits    Narrative:     GFR Normal >60  Chronic Kidney Disease <60  Kidney Failure <15    The GFR formula is only valid for adults with stable renal function between ages 18 and 70.   TROPONIN - Abnormal; Notable for the following components:    HS Troponin T 42 (*)     All other components within normal limits    Narrative:     High Sensitive Troponin T Reference Range:  <14.0 ng/L- Negative Female for AMI  <22.0 ng/L- Negative Male for AMI  >=14 - Abnormal Female indicating possible myocardial  injury.  >=22 - Abnormal Male indicating possible myocardial injury.   Clinicians would have to utilize clinical acumen, EKG, Troponin, and serial changes to determine if it is an Acute Myocardial Infarction or myocardial injury due to an underlying chronic condition.        CBC WITH AUTO DIFFERENTIAL - Abnormal; Notable for the following components:    RBC 4.02 (*)     MCV 98.0 (*)     Lymphocyte % 18.3 (*)     Immature Grans % 0.6 (*)     All other components within normal limits   HIGH SENSITIVITIY TROPONIN T 2HR - Abnormal; Notable for the following components:    HS Troponin T 42 (*)     All other components within normal limits    Narrative:     High Sensitive Troponin T Reference Range:  <14.0 ng/L- Negative Female for AMI  <22.0 ng/L- Negative Male for AMI  >=14 - Abnormal Female indicating possible myocardial injury.  >=22 - Abnormal Male indicating possible myocardial injury.   Clinicians would have to utilize clinical acumen, EKG, Troponin, and serial changes to determine if it is an Acute Myocardial Infarction or myocardial injury due to an underlying chronic condition.        URINALYSIS W/ CULTURE IF INDICATED - Abnormal; Notable for the following components:    Appearance, UA Cloudy (*)     Leuk Esterase, UA Moderate (2+) (*)     Nitrite, UA Positive (*)     All other components within normal limits    Narrative:     In absence of clinical symptoms, the presence of pyuria, bacteria, and/or nitrites on the urinalysis result does not correlate with infection.   URINALYSIS, MICROSCOPIC ONLY - Abnormal; Notable for the following components:    WBC, UA 21-50 (*)     Bacteria, UA 4+ (*)     Squamous Epithelial Cells, UA 3-6 (*)     All other components within normal limits   LACTIC ACID, PLASMA - Abnormal; Notable for the following components:    Lactate 2.9 (*)     All other components within normal limits   MAGNESIUM - Normal   BNP (IN-HOUSE) - Normal    Narrative:     This assay is used as an aid in the  "diagnosis of individuals suspected of having heart failure. It can be used as an aid in the diagnosis of acute decompensated heart failure (ADHF) in patients presenting with signs and symptoms of ADHF to the emergency department (ED). In addition, NT-proBNP of <300 pg/mL indicates ADHF is not likely.    Age Range Result Interpretation  NT-proBNP Concentration (pg/mL:      <50             Positive            >450                   Gray                 300-450                    Negative             <300    50-75           Positive            >900                  Gray                300-900                  Negative            <300      >75             Positive            >1800                  Gray                300-1800                  Negative            <300   PROCALCITONIN - Normal    Narrative:     As a Marker for Sepsis (Non-Neonates):    1. <0.5 ng/mL represents a low risk of severe sepsis and/or septic shock.  2. >2 ng/mL represents a high risk of severe sepsis and/or septic shock.    As a Marker for Lower Respiratory Tract Infections that require antibiotic therapy:    PCT on Admission    Antibiotic Therapy       6-12 Hrs later    >0.5                Strongly Recommended  >0.25 - <0.5        Recommended   0.1 - 0.25          Discouraged              Remeasure/reassess PCT  <0.1                Strongly Discouraged     Remeasure/reassess PCT    As 28 day mortality risk marker: \"Change in Procalcitonin Result\" (>80% or <=80%) if Day 0 (or Day 1) and Day 4 values are available. Refer to [email or web address]://[email or web address]-pct-[email or web address]    Change in PCT <=80%  A decrease of PCT levels below or equal to 80% defines a positive change in PCT test result representing a higher risk for 28-day all-cause mortality of patients diagnosed with severe sepsis for septic shock.    Change in PCT >80%  A decrease of PCT levels of more than 80% defines a negative change in PCT result representing a lower risk for 28-day all-cause " mortality of patients diagnosed with severe sepsis or septic shock.      URINE CULTURE   BLOOD CULTURE   BLOOD CULTURE   RAINBOW DRAW    Narrative:     The following orders were created for panel order [place] Draw.  Procedure                               Abnormality         Status                     ---------                               -----------         ------                     Green Top (Gel)[[id number]]                                  Final result               Lavender Top[[id number]]                                     Final result               Gold Top - SST[[id number]]                                   Final result               Martin Top[[id number]]                                         Final result               Light Blue Top[[id number]]                                   Final result                 Please view results for these tests on the individual orders.   LACTIC ACID, REFLEX   CBC AND DIFFERENTIAL    Narrative:     The following orders were created for panel order CBC & Differential.  Procedure                               Abnormality         Status                     ---------                               -----------         ------                     CBC Auto Differential[[id number]]        Abnormal            Final result                 Please view results for these tests on the individual orders.   GREEN TOP   LAVENDER TOP   GOLD TOP - SST   GRAY TOP   LIGHT BLUE TOP       Meds Given in ED:   Medications   sodium chloride 0.9 % flush 10 mL (has no administration in time range)   meropenem (MERREM) 1,000 mg in sodium chloride 0.9 % 100 mL IVPB (1,000 mg Intravenous New Bag 1/25/24 2016)   sodium chloride 0.9 % bolus 3,000 mL (3,000 mL Intravenous New Bag 1/25/24 2015)   sodium chloride 0.9 % bolus 500 mL (0 mL Intravenous Stopped 1/25/24 1919)

## 2024-01-26 NOTE — PROGRESS NOTES
[facility] Medicine Services  PROGRESS NOTE    Patient Name: Darien Camarena  : 1935  MRN: [id number]    Date of Admission: 2024  Primary Care Physician: Pito Way MD    Subjective   Subjective     CC:  Dizziness    HPI:  Patient is an 88-year-old who presented to the emergency department with complaints of dizziness and hypotension.  He was also recently treated for UTI.  Given history of A-fib cardiology was consulted and medications adjusted.  Today he is feeling better and denies current dizziness.  Blood pressure noted to still be low less than 100 systolic.  Tachycardia also noted.      Objective   Objective     Vital Signs:   Temp:  [97.8 °F (36.6 °C)-98.6 °F (37 °C)] 98.6 °F (37 °C)  Heart Rate:  [] 114  Resp:  [16-20] 20  BP: ()/(62-98) 114/71     Physical Exam:  Constitutional: No acute distress, awake, alert  HENT: NCAT, mucous membranes moist  Respiratory: Clear to auscultation bilaterally, respiratory effort normal   Cardiovascular: Tachycardia, heart rate 102  Gastrointestinal: Positive bowel sounds, soft, nontender, nondistended  Musculoskeletal: No bilateral ankle edema  Psychiatric: Appropriate affect, cooperative  Neurologic: Oriented x 3, strength symmetric in all extremities, Cranial Nerves grossly intact to confrontation, speech clear  Skin: No rashes      Results Reviewed:  LAB RESULTS:      Lab 24  0413 24  2329 24  2032 24  1813 24  1552   WBC 6.94  --   --   --  7.96   HEMOGLOBIN 11.3*  --   --   --  13.0   HEMATOCRIT 34.6*  --   --   --  39.4   PLATELETS 195  --   --   --  243   NEUTROS ABS  --   --   --   --  5.65   IMMATURE GRANS (ABS)  --   --   --   --  0.05   LYMPHS ABS  --   --   --   --  1.46   MONOS ABS  --   --   --   --  0.46   EOS ABS  --   --   --   --  0.27   MCV 96.6  --   --   --  98.0*   SED RATE  --   --   --   --  124*   CRP  --   --   --  1.18*  --    PROCALCITONIN  --   --    --  0.04  --    LACTATE  --  1.9 2.2*  --  2.9*         Lab 01/26/24  0413 01/25/24  1552   SODIUM 137 136   POTASSIUM 3.7 4.6   CHLORIDE 99 96*   CO2 28.0 27.0   ANION GAP 10.0 13.0   BUN 49* 52*   CREATININE 1.14 1.21   EGFR 62.2 57.9*   GLUCOSE 178* 138*   CALCIUM 8.9 9.3   MAGNESIUM 1.7 1.7         Lab 01/25/24  1552   TOTAL PROTEIN 7.3   ALBUMIN 3.4*   GLOBULIN 3.9   ALT (SGPT) 38   AST (SGOT) 41*   BILIRUBIN 0.6   ALK PHOS 240*         Lab 01/25/24  1813 01/25/24  1552   PROBNP  --  1,428.0   HSTROP T 42* 42*                 Brief Urine Lab Results  (Last result in the past 365 days)        Color   Clarity   Blood   Leuk Est   Nitrite   Protein   CREAT   Urine HCG        01/25/24 1913 Yellow   Cloudy   Negative   Moderate (2+)   Positive   Negative                   Microbiology Results Abnormal       None            XR Chest 1 View    Result Date: 1/25/2024  XR CHEST 1 VW Date of Exam: 1/25/2024 3:34 PM EST Indication: Chest Pain Protocol Comparison: Chest radiograph and chest CT 1/3/2024 Findings: Cardiomediastinal silhouette is unchanged. Similar left hemidiaphragm with left basilar scarring/subsegmental atelectasis. No focal consolidation or overt pulmonary edema. No pleural effusion or pneumothorax. Osseous structures are unchanged.     Impression: Impression: No evidence of acute cardiopulmonary disease. Electronically Signed: Surya Lion MD  1/25/2024 3:52 PM EST  Workstation ID: [id number]     Results for orders placed during the hospital encounter of 01/12/24    Adult Transesophageal Echo 3D (FARHAD) W/ Cont If Necessary Per Protocol    Interpretation Summary    Left ventricular systolic function is mildly decreased. Estimated left ventricular EF = 40%    The left ventricular cavity is mildly dilated.    The left atrial cavity is dilated.    There is a 27mm Watchman device which appears well seated and well compressed. Images obtained with difficulty achieving good resolution of the borders but no  periprosthetic flow was seen. No device related thrombus seen.    No aortic valve regurgitation or stenosis is present. There is mild thickening of the aortic valve. The aortic valve appears trileaflet.    There is mild, bileaflet mitral valve thickening present. Mild mitral valve regurgitation is present with a centrally-directed jet noted. No significant mitral valve stenosis is present.    The tricuspid valve is normal in structure. Trace tricuspid valve regurgitation is present.    Borderline dilation of the ascending aorta is present. Ascending aorta = 3.6 cm No dilation of the descending aorta present. There is moderate plaque in the descending aorta present.      Current medications:  Scheduled Meds:allopurinol, 300 mg, Oral, Daily  aspirin, 81 mg, Oral, Daily  clopidogrel, 75 mg, Oral, Daily  digoxin, 250 mcg, Oral, Daily  donepezil, 10 mg, Oral, Nightly  finasteride, 5 mg, Oral, Nightly  heparin (porcine), 5,000 Units, Subcutaneous, Q8H  lactobacillus acidophilus, 1 capsule, Oral, Daily  memantine, 10 mg, Oral, BID  meropenem, 1,000 mg, Intravenous, Once  meropenem, 1,000 mg, Intravenous, Q8H  metoprolol tartrate, 12.5 mg, Oral, BID  midodrine, 5 mg, Oral, TID AC  multivitamin with minerals, 1 tablet, Oral, Nightly  pantoprazole, 40 mg, Oral, Q AM  pravastatin, 40 mg, Oral, Nightly  senna-docusate sodium, 2 tablet, Oral, BID  sertraline, 50 mg, Oral, Daily  sodium chloride, 10 mL, Intravenous, Q12H  tamsulosin, 0.4 mg, Oral, Daily  tiotropium bromide monohydrate, 2 puff, Inhalation, Daily - RT      Continuous Infusions:   PRN Meds:.  acetaminophen    albuterol    senna-docusate sodium **AND** polyethylene glycol **AND** bisacodyl **AND** bisacodyl    Calcium Replacement - Follow Nurse / BPA Driven Protocol    Magnesium Standard Dose Replacement - Follow Nurse / BPA Driven Protocol    Phosphorus Replacement - Follow Nurse / BPA Driven Protocol    Potassium Replacement - Follow Nurse / BPA Driven Protocol     sodium chloride    sodium chloride    sodium chloride    Assessment & Plan   Assessment & Plan     Active Hospital Problems    Diagnosis  POA    **Hypotension [I95.9]  Yes    Low left ventricular ejection fraction [R94.30]  Yes    Sepsis [A41.9]  Yes    Recurrent UTI [N39.0]  Yes    Presence of Watchman left atrial appendage closure device [Z95.818]  Yes    Stage 3 chronic kidney disease [N18.30]  Yes    Persistent atrial fibrillation [I48.19]  Yes    Enlarged prostate without lower urinary tract symptoms (luts) [N40.0]  Yes    Gastroesophageal reflux disease with esophagitis [K21.00]  Yes    Hyperlipidemia LDL goal <100 [E78.5]  Yes    Essential hypertension [I10]  Yes    Type 2 diabetes mellitus with stage 3 chronic kidney disease, without long-term current use of insulin [E11.22, N18.30]  Yes    Obstructive sleep apnea syndrome [G47.33]  Yes      Resolved Hospital Problems   No resolved problems to display.        Brief Hospital Course to date:  Darien Camarena is a 88 y.o. male admitted with symptomatic hypotension (dizziness) and tachycardia.  Recently treated for complicated UTI and noted history of urinary retention requiring self in and out cath.    Symptomatic hypotension  Tachycardia  A-fib with Watchman device  Chronic systolic congestive heart failure  -Holding diuretics and antihypertensives except metoprolol at lower dose  -Added midodrine  -Cardiology consulted    CKD stage III    COPD    Enlarged prostate  Urinary retention  Recent UTI  -ID to see for discussion of antibiotics, currently on Merrem  -In and out cath every 8 and as needed  -Continue Flomax    Diabetes mellitus type 2    Dementia    Depression        Expected Discharge Location and Transportation: Discharge home  Expected Discharge   Expected Discharge Date: 1/27/2024; Expected Discharge Time:      DVT prophylaxis:  Medical DVT prophylaxis orders are present.         AM-PAC 6 Clicks Score (PT): 19 (01/26/24 0326)    CODE STATUS:    Code Status and Medical Interventions:   Ordered at: 01/25/24 2214     Code Status (Patient has no pulse and is not breathing):    CPR (Attempt to Resuscitate)     Medical Interventions (Patient has pulse or is breathing):    Full Support       Valeria Wilkins MD  01/26/24

## 2024-01-26 NOTE — PLAN OF CARE
Goal Outcome Evaluation:  Plan of Care Reviewed With: patient        Progress: no change       Problem: Adult Inpatient Plan of Care  Goal: Absence of Hospital-Acquired Illness or Injury  Intervention: Identify and Manage Fall Risk  Recent Flowsheet Documentation  Taken 1/25/2024 2200 by Vipin Metz RN  Safety Promotion/Fall Prevention:   activity supervised   room organization consistent  Intervention: Prevent Skin Injury  Recent Flowsheet Documentation  Taken 1/25/2024 2200 by Vipin Metz RN  Body Position: position changed independently  Intervention: Prevent and Manage VTE (Venous Thromboembolism) Risk  Recent Flowsheet Documentation  Taken 1/25/2024 2200 by Vipin Metz RN  Activity Management: activity encouraged     Problem: Adult Inpatient Plan of Care  Goal: Absence of Hospital-Acquired Illness or Injury  Intervention: Prevent Skin Injury  Recent Flowsheet Documentation  Taken 1/25/2024 2200 by Vipin Metz RN  Body Position: position changed independently     Problem: Adult Inpatient Plan of Care  Goal: Absence of Hospital-Acquired Illness or Injury  Intervention: Prevent and Manage VTE (Venous Thromboembolism) Risk  Recent Flowsheet Documentation  Taken 1/25/2024 2200 by Vipin Metz RN  Activity Management: activity encouraged

## 2024-01-26 NOTE — PAYOR COMM NOTE
"Ref# [id number]  Clinical Update    Utilization Review  Phone [phone]  Fax [phone]    [address] [address]       Darien Camarena \"Isidro\" (88 y.o. Male)       Date of Birth   1935    Social Security Number       Address   [place] 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), 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), ESBL Klebsiella (06/09/23)   Code Status: CPR    Ht: 190.5 cm (75\")   Wt: 112 kg (248 lb)    Admission Cmt: None   Principal Problem: Hypotension [I95.9]                   Active Insurance as of 1/25/2024       Primary Coverage       Payor Plan Insurance Group Employer/Plan Group    Formerly [facility] MEDICARE REPLACEMENT ANTH MEDICARE ADVANTAGE [id number]       Payor Plan Address Payor Plan Phone Number Payor Plan Fax Number Effective Dates    [address] [phone]  1/1/2024 - None Entered    [facility] [address]         Subscriber Name Subscriber Birth Date Member ID       DARIEN CAMARENA 1935 [id number]                     Emergency Contacts        (Rel.) Home Phone Work Phone Mobile Phone    SHAWN KWON (Daughter) [phone] -- [phone]    LEONNEIL (Daughter) [phone] -- [phone]    Dolly Taylor (Daughter) -- -- [phone]              [place]: [facility] [id number] Tax ID [id number]  Insurance Information                  Formerly [facility] MEDICARE " REPLACEMENT/ANTHEM MEDICARE ADVANTAGE Phone: [phone]    Subscriber: Darien Camarena Subscriber#: [id number]    Group#: [id number] Precert#: --             History & Physical        Anthony Franklin MD at [phone]              [facility] Medicine Services  HISTORY AND PHYSICAL    Patient Name: Darien Camarena  : 1935  MRN: [id number]  Primary Care Physician: Pito Way MD  Date of admission: 2024      Subjective  Subjective     Chief Complaint:  Dizziness    HPI:  Darien Camarena is a 88 y.o. male with past medical history of A-fib status post Watchman device, COPD, type 2 diabetes well-controlled, CKD 3.  He is presenting with dizziness.  He had a recent admission, discharged  for similar symptoms.  He describes episodes of lightheadedness, fogginess, warmth sensation, especially when he is going from sitting to standing.  On the last admission he was found to be hypotensive and had evidence of UTI, was treated with carbapenems and saw ID outpatient to finish course on  as he has a history of resistant Klebsiella.  Cultures at that time did end up growing E. coli.  He felt well until today when he reported similar dizziness type symptoms that started this morning.  He is not having any fevers or chills, congestion.  He does not have any abdominal pain but was nauseous while he was getting prior antibiotics.  No sick contacts.  He does not report any dysuria or bladder discomfort.  He has BPH and he self catheterizes about 4 times a day.      Personal History     Past Medical History:   Diagnosis Date    Arrhythmia     Atrial fibrillation     Bilateral leg cramps     possible new medication related side effects    Chronic kidney disease     Clotting disorder     pt doesnt know about this    COPD (chronic obstructive pulmonary disease)     Coronary artery disease     Diabetes mellitus     doesnt check sugar    E. coli sepsis 2022     Enlarged prostate without lower urinary tract symptoms (luts) 06/20/2016    Full dentures     GERD (gastroesophageal reflux disease)     Gout     Hearing aid worn     bilat prn    History of colonoscopy 09/12/2012    History of radiation therapy 02/24/2023    SBRT LLL lung    [place] (hard of hearing)     hearing aids prn    Hyperlipidemia     Hypertension     Kidney stone     surgery x1    Lung cancer     STEVEN on CPAP     compliant with machine    PAF (paroxysmal atrial fibrillation)     Prostatism     Sleep apnea     CPAP HS    Urinary incontinence     Urinary tract infection     Wears glasses     readers         Oncology Problem List:  Malignant neoplasm of lower lobe of left lung (01/30/2023; Status:   Active)    Oncology/Hematology History   Malignant neoplasm of lower lobe of left lung   1/30/2023 Initial Diagnosis    Malignant neoplasm of lower lobe of left lung (HCC)     2/14/2023 - 2/24/2023 Radiation    Radiation OncologyTreatment Course:  Darien Camarena received 5000 cGy in 5 fractions to left lung via External Beam Radiation - EBRT.       Past Surgical History:   Procedure Laterality Date    ATRIAL APPENDAGE EXCLUSION LEFT WITH TRANSESOPHAGEAL ECHOCARDIOGRAM N/A 11/28/2023    Procedure: Atrial Appendage Occlusion;  Surgeon: Anthony Mcdaniel MD;  Location:  [facility];  Service: Cardiovascular;  Laterality: N/A;    CARDIAC CATHETERIZATION Left 09/29/2022    Procedure: Left Heart Cath;  Surgeon: Titus Oliveros IV, MD;  Location:  [facility];  Service: Cardiovascular;  Laterality: Left;    CARDIOVERSION      CATARACT EXTRACTION Bilateral     COLONOSCOPY      CYSTOSCOPY      ENDOSCOPY      possible    KIDNEY STONE SURGERY      x1       Family History: family history includes Alzheimer's disease in his mother; COPD in his father; Cancer in his father; Kidney disease in his father; Lung cancer in his father; No Known Problems in his daughter, daughter, and daughter.      Social History:  reports that he quit smoking about 63 years ago. His smoking use included cigarettes. He started smoking about 77 years ago. He has a 15.00 pack-year smoking history. He has been exposed to tobacco smoke. He quit smokeless tobacco use about 13 years ago.  His smokeless tobacco use included chew. He reports that he does not drink alcohol and does not use drugs.  Social History     Social History Narrative    Caffeine: 1 cup of decaff coffee daily        Medications:  Available home medication information reviewed.  Coenzyme Q10, Iron, Probiotic Product, albuterol sulfate HFA, allopurinol, ascorbic acid, aspirin, clopidogrel, docusate sodium, donepezil, finasteride, furosemide, memantine, metFORMIN, metOLazone, metoprolol tartrate, multivitamin with minerals, omeprazole, potassium chloride, pravastatin, sertraline, tamsulosin, tiotropium bromide-olodaterol, and valsartan    Allergies   Allergen Reactions    Xarelto [Rivaroxaban] GI Bleeding     GI bleed    Penicillins Hives     Has tolerated cefepime, ceftriaxone       Objective  Objective     Vital Signs:   Temp:  [97.9 °F (36.6 °C)-98 °F (36.7 °C)] 98 °F (36.7 °C)  Heart Rate:  [] 90  Resp:  [16-20] 16  BP: ()/(62-98) 131/87       Physical Exam   Awake alert and oriented x 3  Resting comfortably in bed  Mucous membranes are moist  Capillary refill less than 2 seconds  Heart rate irregular  Lungs are clear to auscultation bilaterally  No abdominal tenderness or distention, no CVA tenderness  1+ bilateral lower extremity edema    Result Review:  I have personally reviewed the results from the time of this admission to 1/25/2024 22:14 EST and agree with these findings:  [x]  Laboratory list / accordion  [x]  Microbiology  [x]  Radiology  [x]  EKG/Telemetry   []  Cardiology/Vascular   []  Pathology  [x]  Old records  []  Other:  Most notable findings include: See assessment and plan      LAB RESULTS:      Lab 01/25/24 2032  01/25/24  1813 01/25/24  1552   WBC  --   --  7.96   HEMOGLOBIN  --   --  13.0   HEMATOCRIT  --   --  39.4   PLATELETS  --   --  243   NEUTROS ABS  --   --  5.65   IMMATURE GRANS (ABS)  --   --  0.05   LYMPHS ABS  --   --  1.46   MONOS ABS  --   --  0.46   EOS ABS  --   --  0.27   MCV  --   --  98.0*   PROCALCITONIN  --  0.04  --    LACTATE 2.2*  --  2.9*         Lab 01/25/24  1552   SODIUM 136   POTASSIUM 4.6   CHLORIDE 96*   CO2 27.0   ANION GAP 13.0   BUN 52*   CREATININE 1.21   EGFR 57.9*   GLUCOSE 138*   CALCIUM 9.3   MAGNESIUM 1.7         Lab 01/25/24  1552   TOTAL PROTEIN 7.3   ALBUMIN 3.4*   GLOBULIN 3.9   ALT (SGPT) 38   AST (SGOT) 41*   BILIRUBIN 0.6   ALK PHOS 240*         Lab 01/25/24  1813 01/25/24  1552   PROBNP  --  1,428.0   HSTROP T 42* 42*                 UA          11/6/2023    12:11 1/3/2024    14:41 1/25/2024    19:13   Urinalysis   Squamous Epithelial Cells, UA 7-12  0-2  3-6    Specific Gravity, UA 1.014  1.012  1.016    Ketones, UA Negative  Negative  Negative    Blood, UA Trace  Negative  Negative    Leukocytes, UA Trace  Moderate (2+)  Moderate (2+)    Nitrite, UA Negative  Positive  Positive    RBC, UA 3-5  0-2  0-2    WBC, UA 6-10  21-50  21-50    Bacteria, UA None Seen  4+  4+        Microbiology Results (last 10 days)       ** No results found for the last 240 hours. **            XR Chest 1 View    Result Date: 1/25/2024  XR CHEST 1 VW Date of Exam: 1/25/2024 3:34 PM EST Indication: Chest Pain Protocol Comparison: Chest radiograph and chest CT 1/3/2024 Findings: Cardiomediastinal silhouette is unchanged. Similar left hemidiaphragm with left basilar scarring/subsegmental atelectasis. No focal consolidation or overt pulmonary edema. No pleural effusion or pneumothorax. Osseous structures are unchanged.     Impression: Impression: No evidence of acute cardiopulmonary disease. Electronically Signed: Surya Stone, MD  1/25/2024 3:52 PM EST  Workstation ID: [id number]     Results for orders  placed during the hospital encounter of 01/12/24    Adult Transesophageal Echo 3D (FARHAD) W/ Cont If Necessary Per Protocol    Interpretation Summary    Left ventricular systolic function is mildly decreased. Estimated left ventricular EF = 40%    The left ventricular cavity is mildly dilated.    The left atrial cavity is dilated.    There is a 27mm Watchman device which appears well seated and well compressed. Images obtained with difficulty achieving good resolution of the borders but no periprosthetic flow was seen. No device related thrombus seen.    No aortic valve regurgitation or stenosis is present. There is mild thickening of the aortic valve. The aortic valve appears trileaflet.    There is mild, bileaflet mitral valve thickening present. Mild mitral valve regurgitation is present with a centrally-directed jet noted. No significant mitral valve stenosis is present.    The tricuspid valve is normal in structure. Trace tricuspid valve regurgitation is present.    Borderline dilation of the ascending aorta is present. Ascending aorta = 3.6 cm No dilation of the descending aorta present. There is moderate plaque in the descending aorta present.      Assessment & Plan  Assessment & Plan       Sepsis    Type 2 diabetes mellitus with stage 3 chronic kidney disease, without long-term current use of insulin    Enlarged prostate without lower urinary tract symptoms (luts)    Gastroesophageal reflux disease with esophagitis    Hyperlipidemia LDL goal <100    Essential hypertension    Obstructive sleep apnea syndrome    Paroxysmal atrial fibrillation    Stage 3 chronic kidney disease    Suspected sepsis secondary to urinary tract infection  -Hypotensive and tachycardic on arrival to ED, improved following 1 L fluids.  Lactic acid mildly elevated.  White blood count is not elevated and procalcitonin is not elevated.  Received 1 dose of meropenem in the ED.  History of ESBL Klebsiella, underwent recent carbapenem  treatment ending 1/8.  Cultures from that time growing E. coli with susceptibility to cephalosporins.  UA on admission with moderate leuk esterase, positive nitrites, white blood cells.  He does not report dysuria or discomfort but he self caths  -I am not entirely convinced that this is an active infection, however given septic picture on admission we will continue antibiotics based on most recent culture and sensitivities.  Will change meropenem to ceftriaxone.  Will check inflammatory markers.  -ID consult in the morning    Hypotension  Congestive systolic heart failure without acute exacerbation  A-fib status post Watchman  -Initially tachycardic and hypotensive on presentation now improved  -Will reduce metoprolol from 100 twice daily to 25 twice daily and increase as tolerated  -Hold furosemide, losartan, and metolazone ordered and held  -Continue dual antiplatelet therapy and statin  -Check orthostatic vitals in the morning  -Cardiology consult in the morning for further advice regarding medication adjustment as this is his second episode of hypotension    CKD3   -GFR 57 on admission which is decreased from 78 prior, check am BMP following fluid repletion    COPD not in acute exacerbation  -Continue home inhaler equivalents    Enlarged prostate  -Continue home finasteride, continue home Flomax if blood pressure allows  -Patient self catheterizes 4 times a day    Well-controlled type 2 diabetes on oral meds  -Takes metformin at home, monitor sugars here, add corrective factor as needed    Dementia  -Continue Namenda and Aricept    Depression  -Continue home sertraline        DVT prophylaxis: Subcutaneous heparin  Medical DVT prophylaxis orders are present.          CODE STATUS:    Code Status and Medical Interventions:   Ordered at: 01/25/24 7081     Code Status (Patient has no pulse and is not breathing):    CPR (Attempt to Resuscitate)     Medical Interventions (Patient has pulse or is breathing):    Full  Support       Expected Discharge   Expected discharge date/ time has not been documented.     Anthony Franklin MD  01/25/24      Electronically signed by Anthony Franklin MD at 01/25/24 2228          Emergency Department Notes        Willow Anderson, RN at 01/25/24 2022           Darien Camarena    Nursing Report ED to Floor:  Mental status: a&ox4  Ambulatory status: assist x1  Oxygen Therapy:  room air   Cardiac Rhythm: afib  Admitted from: home  Safety Concerns:  fall risk  Social Issues: n/a  ED Room #:  27    ED Nurse Phone Extension - [phone] or may call [phone].      HPI:   Chief Complaint   Patient presents with    Hypotension       Past Medical History:  Past Medical History:   Diagnosis Date    Arrhythmia     Atrial fibrillation     Bilateral leg cramps     possible new medication related side effects    Chronic kidney disease     Clotting disorder     pt doesnt know about this    COPD (chronic obstructive pulmonary disease)     Coronary artery disease     Diabetes mellitus     doesnt check sugar    E. coli sepsis 06/23/2022    Enlarged prostate without lower urinary tract symptoms (luts) 06/20/2016    Full dentures     GERD (gastroesophageal reflux disease)     Gout     Hearing aid worn     bilat prn    History of colonoscopy 09/12/2012    History of radiation therapy 02/24/2023    SBRT LLL lung    [place] (hard of hearing)     hearing aids prn    Hyperlipidemia     Hypertension     Kidney stone     surgery x1    Lung cancer     STEVEN on CPAP     compliant with machine    PAF (paroxysmal atrial fibrillation)     Prostatism     Sleep apnea     CPAP HS    Urinary incontinence     Urinary tract infection     Wears glasses     readers        Past Surgical History:  Past Surgical History:   Procedure Laterality Date    ATRIAL APPENDAGE EXCLUSION LEFT WITH TRANSESOPHAGEAL ECHOCARDIOGRAM N/A 11/28/2023    Procedure: Atrial Appendage Occlusion;  Surgeon: Anthony Mcdaniel MD;  Location: [facility] INVASIVE LOCATION;  Service:  Cardiovascular;  Laterality: N/A;    CARDIAC CATHETERIZATION Left 09/29/2022    Procedure: Left Heart Cath;  Surgeon: Titus Oliveros IV, MD;  Location: [facility] CATH INVASIVE LOCATION;  Service: Cardiovascular;  Laterality: Left;    CARDIOVERSION      CATARACT EXTRACTION Bilateral     COLONOSCOPY      CYSTOSCOPY      ENDOSCOPY      possible    KIDNEY STONE SURGERY      x1        Admitting Doctor:   Anthony Franklin MD    Consulting Provider(s):  Consults       Date and Time Order Name Status Description    1/3/2024  6:08 PM Inpatient Infectious Diseases Consult Completed              Admitting Diagnosis:   The primary encounter diagnosis was Sepsis, due to unspecified organism, unspecified whether acute organ dysfunction present. A diagnosis of Acute cystitis without hematuria was also pertinent to this visit.    Most Recent Vitals:   Vitals:    01/25/24 1831 01/25/24 1846 01/25/24 1917 01/25/24 1932   BP: 105/74 111/73 116/88 109/81   BP Location:       Patient Position:       Pulse: 114 97 118 114   Resp:       Temp:       TempSrc:       SpO2: (!) 84% 94%  94%   Weight:       Height:           Active LDAs/IV Access:   Lines, Drains & Airways       Active LDAs       Name Placement date Placement time Site Days    Peripheral IV 01/25/24 1553 Posterior;Right Forearm 01/25/24  1553  Forearm  less than 1                    Labs (abnormal labs have a star):   Labs Reviewed   COMPREHENSIVE METABOLIC PANEL - Abnormal; Notable for the following components:       Result Value    Glucose 138 (*)     BUN 52 (*)     Chloride 96 (*)     Albumin 3.4 (*)     AST (SGOT) 41 (*)     Alkaline Phosphatase 240 (*)     BUN/Creatinine Ratio 43.0 (*)     eGFR 57.9 (*)     All other components within normal limits    Narrative:     GFR Normal >60  Chronic Kidney Disease <60  Kidney Failure <15    The GFR formula is only valid for adults with stable renal function between ages 18 and 70.   TROPONIN - Abnormal; Notable for the  following components:    HS Troponin T 42 (*)     All other components within normal limits    Narrative:     High Sensitive Troponin T Reference Range:  <14.0 ng/L- Negative Female for AMI  <22.0 ng/L- Negative Male for AMI  >=14 - Abnormal Female indicating possible myocardial injury.  >=22 - Abnormal Male indicating possible myocardial injury.   Clinicians would have to utilize clinical acumen, EKG, Troponin, and serial changes to determine if it is an Acute Myocardial Infarction or myocardial injury due to an underlying chronic condition.        CBC WITH AUTO DIFFERENTIAL - Abnormal; Notable for the following components:    RBC 4.02 (*)     MCV 98.0 (*)     Lymphocyte % 18.3 (*)     Immature Grans % 0.6 (*)     All other components within normal limits   HIGH SENSITIVITIY TROPONIN T 2HR - Abnormal; Notable for the following components:    HS Troponin T 42 (*)     All other components within normal limits    Narrative:     High Sensitive Troponin T Reference Range:  <14.0 ng/L- Negative Female for AMI  <22.0 ng/L- Negative Male for AMI  >=14 - Abnormal Female indicating possible myocardial injury.  >=22 - Abnormal Male indicating possible myocardial injury.   Clinicians would have to utilize clinical acumen, EKG, Troponin, and serial changes to determine if it is an Acute Myocardial Infarction or myocardial injury due to an underlying chronic condition.        URINALYSIS W/ CULTURE IF INDICATED - Abnormal; Notable for the following components:    Appearance, UA Cloudy (*)     Leuk Esterase, UA Moderate (2+) (*)     Nitrite, UA Positive (*)     All other components within normal limits    Narrative:     In absence of clinical symptoms, the presence of pyuria, bacteria, and/or nitrites on the urinalysis result does not correlate with infection.   URINALYSIS, MICROSCOPIC ONLY - Abnormal; Notable for the following components:    WBC, UA 21-50 (*)     Bacteria, UA 4+ (*)     Squamous Epithelial Cells, UA 3-6 (*)      "All other components within normal limits   LACTIC ACID, PLASMA - Abnormal; Notable for the following components:    Lactate 2.9 (*)     All other components within normal limits   MAGNESIUM - Normal   BNP (IN-HOUSE) - Normal    Narrative:     This assay is used as an aid in the diagnosis of individuals suspected of having heart failure. It can be used as an aid in the diagnosis of acute decompensated heart failure (ADHF) in patients presenting with signs and symptoms of ADHF to the emergency department (ED). In addition, NT-proBNP of <300 pg/mL indicates ADHF is not likely.    Age Range Result Interpretation  NT-proBNP Concentration (pg/mL:      <50             Positive            >450                   Gray                 300-450                    Negative             <300    50-75           Positive            >900                  Gray                300-900                  Negative            <300      >75             Positive            >1800                  Gray                300-1800                  Negative            <300   PROCALCITONIN - Normal    Narrative:     As a Marker for Sepsis (Non-Neonates):    1. <0.5 ng/mL represents a low risk of severe sepsis and/or septic shock.  2. >2 ng/mL represents a high risk of severe sepsis and/or septic shock.    As a Marker for Lower Respiratory Tract Infections that require antibiotic therapy:    PCT on Admission    Antibiotic Therapy       6-12 Hrs later    >0.5                Strongly Recommended  >0.25 - <0.5        Recommended   0.1 - 0.25          Discouraged              Remeasure/reassess PCT  <0.1                Strongly Discouraged     Remeasure/reassess PCT    As 28 day mortality risk marker: \"Change in Procalcitonin Result\" (>80% or <=80%) if Day 0 (or Day 1) and Day 4 values are available. Refer to http://www.iRules-pct-calculator.com    Change in PCT <=80%  A decrease of PCT levels below or equal to 80% defines a positive change in PCT test " result representing a higher risk for 28-day all-cause mortality of patients diagnosed with severe sepsis for septic shock.    Change in PCT >80%  A decrease of PCT levels of more than 80% defines a negative change in PCT result representing a lower risk for 28-day all-cause mortality of patients diagnosed with severe sepsis or septic shock.      URINE CULTURE   BLOOD CULTURE   BLOOD CULTURE   RAINBOW DRAW    Narrative:     The following orders were created for panel order [place] Draw.  Procedure                               Abnormality         Status                     ---------                               -----------         ------                     Green Top (Gel)[[id number]]                                  Final result               Lavender Top[[id number]]                                     Final result               Gold Top - SST[[id number]]                                   Final result               Martin Top[[id number]]                                         Final result               Light Blue Top[[id number]]                                   Final result                 Please view results for these tests on the individual orders.   LACTIC ACID, REFLEX   CBC AND DIFFERENTIAL    Narrative:     The following orders were created for panel order CBC & Differential.  Procedure                               Abnormality         Status                     ---------                               -----------         ------                     CBC Auto Differential[[id number]]        Abnormal            Final result                 Please view results for these tests on the individual orders.   GREEN TOP   LAVENDER TOP   GOLD TOP - SST   GRAY TOP   LIGHT BLUE TOP       Meds Given in ED:   Medications   sodium chloride 0.9 % flush 10 mL (has no administration in time range)   meropenem (MERREM) 1,000 mg in sodium chloride 0.9 % 100 mL IVPB (1,000 mg Intravenous New Bag 1/25/24 2016)   sodium chloride 0.9 % bolus  3,000 mL (3,000 mL Intravenous New Bag 1/25/24 2015)   sodium chloride 0.9 % bolus 500 mL (0 mL Intravenous Stopped 1/25/24 1919)              Electronically signed by Willow Anderson RN at 01/25/24 2023       Last Calvin Jr., PA-C at 01/25/24 1536        Procedure Orders    1. Critical Care [[id number]] ordered by Last Calvin Jr., PA-C                 Subjective  History of Present Illness:    Chief Complaint: Low blood pressure, dizziness, weakness  History of Present Illness: 88-year-old male presents with clinical past medical history of A-fib, chronic kidney disease, COPD, coronary disease, hypertension, hyperlipidemia, he states that he lives alone, he took his medication at 5 AM like he normally does, went back to bed, when he woke up later he felt weak, fatigued and dizzy especially with standing, he took his blood pressure realize that it was low, spoke to family, and came in for further valuation.  He had a recent Watchman implantation in November, Dr. Oliveros's his cardiologist, he also had a FARHAD performed this month, he was previously on Eliquis but is now on aspirin and Plavix after the FARHAD was performed.  Onset: Sudden onset  Duration: Symptoms started this morning  Exacerbating / Alleviating factors: Symptoms worse with standing or activity  Associated symptoms: Weakness      Nurses Notes reviewed and agree, including vitals, allergies, social history and prior medical history.     REVIEW OF SYSTEMS: All systems reviewed and not pertinent unless noted.    Review of Systems   Constitutional:  Positive for fatigue.   Neurological:  Positive for weakness.        Near passing out   All other systems reviewed and are negative.      Past Medical History:   Diagnosis Date    Arrhythmia     Atrial fibrillation     Bilateral leg cramps     possible new medication related side effects    Chronic kidney disease     Clotting disorder     pt doesnt know about this    COPD (chronic obstructive  pulmonary disease)     Coronary artery disease     Diabetes mellitus     doesnt check sugar    E. coli sepsis 2022    Enlarged prostate without lower urinary tract symptoms (luts) 2016    Full dentures     GERD (gastroesophageal reflux disease)     Gout     Hearing aid worn     bilat prn    History of colonoscopy 2012    History of radiation therapy 2023    SBRT LLL lung    [place] (hard of hearing)     hearing aids prn    Hyperlipidemia     Hypertension     Kidney stone     surgery x1    Lung cancer     STEVEN on CPAP     compliant with machine    PAF (paroxysmal atrial fibrillation)     Prostatism     Sleep apnea     CPAP HS    Urinary incontinence     Urinary tract infection     Wears glasses     readers       Allergies:    Xarelto [rivaroxaban] and Penicillins      Past Surgical History:   Procedure Laterality Date    ATRIAL APPENDAGE EXCLUSION LEFT WITH TRANSESOPHAGEAL ECHOCARDIOGRAM N/A 2023    Procedure: Atrial Appendage Occlusion;  Surgeon: Anthony Mcdaniel MD;  Location:  [facility];  Service: Cardiovascular;  Laterality: N/A;    CARDIAC CATHETERIZATION Left 2022    Procedure: Left Heart Cath;  Surgeon: Titus Oliveros IV, MD;  Location:  [facility];  Service: Cardiovascular;  Laterality: Left;    CARDIOVERSION      CATARACT EXTRACTION Bilateral     COLONOSCOPY      CYSTOSCOPY      ENDOSCOPY      possible    KIDNEY STONE SURGERY      x1         Social History     Socioeconomic History    Marital status:    Tobacco Use    Smoking status: Former     Packs/day: 1.00     Years: 15.00     Additional pack years: 0.00     Total pack years: 15.00     Types: Cigarettes     Start date: 1947     Quit date: 1960     Years since quittin.7     Passive exposure: Past    Smokeless tobacco: Former     Types: Chew     Quit date:     Tobacco comments:     started at 13 yo.   Vaping Use    Vaping Use: Never used    Passive vaping  "exposure: Yes   Substance and Sexual Activity    Alcohol use: No    Drug use: Never    Sexual activity: Not Currently     Partners: Female         Family History   Problem Relation Age of Onset    Alzheimer's disease Mother     COPD Father     Lung cancer Father     Cancer Father     Kidney disease Father     No Known Problems Daughter     No Known Problems Daughter     No Known Problems Daughter        Objective  Physical Exam:  /87 (BP Location: Left arm, Patient Position: Lying)   Pulse 90   Temp 98 °F (36.7 °C) (Oral)   Resp 16   Ht 190.5 cm (75\")   Wt 112 kg (248 lb)   SpO2 92%   BMI 31.00 kg/m²      Physical Exam  Vitals and nursing note reviewed.   Constitutional:       Appearance: He is well-developed.   HENT:      Head: Normocephalic and atraumatic.      Mouth/Throat:      Mouth: Mucous membranes are moist.   Eyes:      Extraocular Movements: Extraocular movements intact.   Cardiovascular:      Rate and Rhythm: Normal rate and regular rhythm.   Pulmonary:      Effort: Pulmonary effort is normal.      Breath sounds: Normal breath sounds.   Abdominal:      Palpations: Abdomen is soft.   Musculoskeletal:         General: Normal range of motion.      Cervical back: Normal range of motion.   Skin:     General: Skin is warm and dry.   Neurological:      Mental Status: He is alert and oriented to person, place, and time.      Motor: Weakness present.   Psychiatric:         Behavior: Behavior normal.         Thought Content: Thought content normal.         Judgment: Judgment normal.           Critical Care    Performed by: Last Calvin Jr., PA-C  Authorized by: Renny Hendrickson MD    Critical care provider statement:     Critical care time (minutes):  40    Critical care time was exclusive of:  Separately billable procedures and treating other patients and teaching time    Critical care was necessary to treat or prevent imminent or life-threatening deterioration of the following conditions: " Sepsis, requiring multiple fluid boluses, and broad-spectrum antibiotics, admission to the hospital for further care.    Critical care was time spent personally by me on the following activities:  Blood draw for specimens, development of treatment plan with patient or surrogate, discussions with consultants, discussions with primary provider, evaluation of patient's response to treatment, examination of patient, interpretation of cardiac output measurements, obtaining history from patient or surrogate, ordering and performing treatments and interventions, ordering and review of laboratory studies, pulse oximetry, ordering and review of radiographic studies, review of old charts and re-evaluation of patient's condition    Care discussed with: admitting provider        ED Course:    ED Course as of 01/25/24 2242   Thu Jan 25, 2024   1527 Jan 2024  Clinical Indication    Arrhythmia; AFib  Dx: Presence of Watchman left atrial appendage closure device [Z95.818 (ICD-10-CM)]; Atrial fibrillation, chronic [I48.20 (ICD-10-CM)]    Interpretation Summary       ·  Left ventricular systolic function is mildly decreased. Estimated left ventricular EF = 40%  ·  The left ventricular cavity is mildly dilated.  ·  The left atrial cavity is dilated.  ·  There is a 27mm Watchman device which appears well seated and well compressed. Images obtained with difficulty achieving good resolution of the borders but no periprosthetic flow was seen. No device related thrombus seen.  ·  No aortic valve regurgitation or stenosis is present. There is mild thickening of the aortic valve. The aortic valve appears trileaflet.  ·  There is mild, bileaflet mitral valve thickening present. Mild mitral valve regurgitation is present with a centrally-directed jet noted. No significant mitral valve stenosis is present.  ·  The tricuspid valve is normal in structure. Trace tricuspid valve regurgitation is present.  ·  Borderline dilation of the ascending  aorta is present. Ascending aorta = 3.6 cm No dilation of the descending aorta present. There is moderate plaque in the descending aorta present.      [CS]   1527 Review of previous  non ED visits, prior labs, prior imaging, available notes from prior evaluations or visits with specialists, medication list, allergies, past medical history, past surgical history     [CS]   1652 Chest x-ray interpretation by me: No acute cardiopulmonary abnormality [CS]   1652 I Personally reviewed all laboratory studies performed in the emergency department  [CS]   1939 Patient states his symptoms are resolved, his daughter reports the last time he had hypotension he was found to have urinary tract infection, reviewed the records he was septic at that time, he states that his symptoms are more pronounced, according to records he received 3 L of normal saline at that time, he has received half a liter of normal saline while in the ED, he is still at the bedside and did not feel any weakness or dizziness. [CS]   2000 Patient continues to have hypotension and has a positive urinalysis, based on previous cultures, I discussed with pharmacy, and my supervising physician Dr. Hendrickson, we decided to do meropenem, fluid bolus 30mg/kg and admit [CS]      ED Course User Index  [CS] Last Calvin Jr., DURAN       Lab Results (last 24 hours)       Procedure Component Value Units Date/Time    CBC & Differential [[id number]]  (Abnormal) Collected: 01/25/24 1552    Specimen: Blood Updated: 01/25/24 1606    Narrative:      The following orders were created for panel order CBC & Differential.  Procedure                               Abnormality         Status                     ---------                               -----------         ------                     CBC Auto Differential[[id number]]        Abnormal            Final result                 Please view results for these tests on the individual orders.    Comprehensive Metabolic Panel  [[id number]]  (Abnormal) Collected: 01/25/24 1552    Specimen: Blood Updated: 01/25/24 1629     Glucose 138 mg/dL      BUN 52 mg/dL      Creatinine 1.21 mg/dL      Sodium 136 mmol/L      Potassium 4.6 mmol/L      Comment: Slight hemolysis detected by analyzer. Result may be falsely elevated.        Chloride 96 mmol/L      CO2 27.0 mmol/L      Calcium 9.3 mg/dL      Total Protein 7.3 g/dL      Albumin 3.4 g/dL      ALT (SGPT) 38 U/L      AST (SGOT) 41 U/L      Alkaline Phosphatase 240 U/L      Total Bilirubin 0.6 mg/dL      Globulin 3.9 gm/dL      Comment: Calculated Result        A/G Ratio 0.9 g/dL      BUN/Creatinine Ratio 43.0     Anion Gap 13.0 mmol/L      eGFR 57.9 mL/min/1.73     Narrative:      GFR Normal >60  Chronic Kidney Disease <60  Kidney Failure <15    The GFR formula is only valid for adults with stable renal function between ages 18 and 70.    High Sensitivity Troponin T [[id number]]  (Abnormal) Collected: 01/25/24 1552    Specimen: Blood Updated: 01/25/24 1629     HS Troponin T 42 ng/L     Narrative:      High Sensitive Troponin T Reference Range:  <14.0 ng/L- Negative Female for AMI  <22.0 ng/L- Negative Male for AMI  >=14 - Abnormal Female indicating possible myocardial injury.  >=22 - Abnormal Male indicating possible myocardial injury.   Clinicians would have to utilize clinical acumen, EKG, Troponin, and serial changes to determine if it is an Acute Myocardial Infarction or myocardial injury due to an underlying chronic condition.         CBC Auto Differential [[id number]]  (Abnormal) Collected: 01/25/24 1552    Specimen: Blood Updated: 01/25/24 1606     WBC 7.96 10*3/mm3      RBC 4.02 10*6/mm3      Hemoglobin 13.0 g/dL      Hematocrit 39.4 %      MCV 98.0 fL      MCH 32.3 pg      MCHC 33.0 g/dL      RDW 13.7 %      RDW-SD 49.3 fl      MPV 10.4 fL      Platelets 243 10*3/mm3      Neutrophil % 71.0 %      Lymphocyte % 18.3 %      Monocyte % 5.8 %      Eosinophil % 3.4 %      Basophil % 0.9 %       Immature Grans % 0.6 %      Neutrophils, Absolute 5.65 10*3/mm3      Lymphocytes, Absolute 1.46 10*3/mm3      Monocytes, Absolute 0.46 10*3/mm3      Eosinophils, Absolute 0.27 10*3/mm3      Basophils, Absolute 0.07 10*3/mm3      Immature Grans, Absolute 0.05 10*3/mm3      nRBC 0.0 /100 WBC     Magnesium [[id number]]  (Normal) Collected: 01/25/24 1552    Specimen: Blood Updated: 01/25/24 1629     Magnesium 1.7 mg/dL     BNP [[id number]]  (Normal) Collected: 01/25/24 1552    Specimen: Blood Updated: 01/25/24 1629     proBNP 1,428.0 pg/mL     Narrative:      This assay is used as an aid in the diagnosis of individuals suspected of having heart failure. It can be used as an aid in the diagnosis of acute decompensated heart failure (ADHF) in patients presenting with signs and symptoms of ADHF to the emergency department (ED). In addition, NT-proBNP of <300 pg/mL indicates ADHF is not likely.    Age Range Result Interpretation  NT-proBNP Concentration (pg/mL:      <50             Positive            >450                   Gray                 300-450                    Negative             <300    50-75           Positive            >900                  Gray                300-900                  Negative            <300      >75             Positive            >1800                  Gray                300-1800                  Negative            <300    Lactic Acid, Plasma [[id number]]  (Abnormal) Collected: 01/25/24 1552    Specimen: Blood Updated: 01/25/24 2013     Lactate 2.9 mmol/L      Comment: Falsely depressed results may occur on samples drawn from patients receiving N-Acetylcysteine (NAC) or Metamizole.       High Sensitivity Troponin T 2Hr [[id number]]  (Abnormal) Collected: 01/25/24 1813    Specimen: Blood from Arm, Left Updated: 01/25/24 1839     HS Troponin T 42 ng/L      Troponin T Delta 0 ng/L     Narrative:      High Sensitive Troponin T Reference Range:  <14.0 ng/L- Negative Female for AMI  <22.0 ng/L-  "Negative Male for AMI  >=14 - Abnormal Female indicating possible myocardial injury.  >=22 - Abnormal Male indicating possible myocardial injury.   Clinicians would have to utilize clinical acumen, EKG, Troponin, and serial changes to determine if it is an Acute Myocardial Infarction or myocardial injury due to an underlying chronic condition.         Procalcitonin [[id number]]  (Normal) Collected: 01/25/24 1813    Specimen: Blood from Arm, Left Updated: 01/25/24 2014     Procalcitonin 0.04 ng/mL     Narrative:      As a Marker for Sepsis (Non-Neonates):    1. <0.5 ng/mL represents a low risk of severe sepsis and/or septic shock.  2. >2 ng/mL represents a high risk of severe sepsis and/or septic shock.    As a Marker for Lower Respiratory Tract Infections that require antibiotic therapy:    PCT on Admission    Antibiotic Therapy       6-12 Hrs later    >0.5                Strongly Recommended  >0.25 - <0.5        Recommended   0.1 - 0.25          Discouraged              Remeasure/reassess PCT  <0.1                Strongly Discouraged     Remeasure/reassess PCT    As 28 day mortality risk marker: \"Change in Procalcitonin Result\" (>80% or <=80%) if Day 0 (or Day 1) and Day 4 values are available. Refer to http://www.[facility]-pct-calculator.com    Change in PCT <=80%  A decrease of PCT levels below or equal to 80% defines a positive change in PCT test result representing a higher risk for 28-day all-cause mortality of patients diagnosed with severe sepsis for septic shock.    Change in PCT >80%  A decrease of PCT levels of more than 80% defines a negative change in PCT result representing a lower risk for 28-day all-cause mortality of patients diagnosed with severe sepsis or septic shock.       Urinalysis With Culture If Indicated - Urine, Clean Catch [[id number]]  (Abnormal) Collected: 01/25/24 1913    Specimen: Urine, Clean Catch Updated: 01/25/24 1940     Color, UA Yellow     Appearance, UA Cloudy     pH, UA 5.5     " Specific Gravity, UA 1.016     Glucose, UA Negative     Ketones, UA Negative     Bilirubin, UA Negative     Blood, UA Negative     Protein, UA Negative     Leuk Esterase, UA Moderate (2+)     Nitrite, UA Positive     Urobilinogen, UA 0.2 E.U./dL    Narrative:      In absence of clinical symptoms, the presence of pyuria, bacteria, and/or nitrites on the urinalysis result does not correlate with infection.    Urinalysis, Microscopic Only - Urine, Clean Catch [[id number]]  (Abnormal) Collected: 01/25/24 1913    Specimen: Urine, Clean Catch Updated: 01/25/24 1940     RBC, UA 0-2 /HPF      WBC, UA 21-50 /HPF      Bacteria, UA 4+ /HPF      Squamous Epithelial Cells, UA 3-6 /HPF      Hyaline Casts, UA 0-6 /LPF      Methodology Automated Microscopy    Urine Culture - Urine, Urine, Clean Catch [[id number]] Collected: 01/25/24 1913    Specimen: Urine, Clean Catch Updated: 01/25/24 1940    Blood Culture - Blood, Arm, Left [[id number]] Collected: 01/25/24 2014    Specimen: Blood from Arm, Left Updated: 01/25/24 2033    Blood Culture - Blood, Arm, Right [[id number]] Collected: 01/25/24 2014    Specimen: Blood from Arm, Right Updated: 01/25/24 2034    STAT Lactic Acid, Reflex [[id number]]  (Abnormal) Collected: 01/25/24 2032    Specimen: Blood Updated: 01/25/24 2055     Lactate 2.2 mmol/L      Comment: Falsely depressed results may occur on samples drawn from patients receiving N-Acetylcysteine (NAC) or Metamizole.                XR Chest 1 View    Result Date: 1/25/2024  XR CHEST 1 VW Date of Exam: 1/25/2024 3:34 PM EST Indication: Chest Pain Protocol Comparison: Chest radiograph and chest CT 1/3/2024 Findings: Cardiomediastinal silhouette is unchanged. Similar left hemidiaphragm with left basilar scarring/subsegmental atelectasis. No focal consolidation or overt pulmonary edema. No pleural effusion or pneumothorax. Osseous structures are unchanged.     Impression: Impression: No evidence of acute cardiopulmonary disease.  Electronically Signed: Surya Lion MD  1/25/2024 3:52 PM EST  Workstation ID: [id number]        Medical Decision Making  Patient Presentation 88-year-old male presented with hypotension, weakness and dizziness on my initial assessment he was hypotensive he did have improvement with fluids transiently, but continued to have low blood pressure readings.    DDX hypotension, sepsis, dehydration, urinary tract infection, acute kidney insufficiency    Data Review/ Non ED Records /Analysis/Ordering unique tests. Review of previous  non ED visits, prior labs, prior imaging, available notes from prior evaluations or visits with specialists, medication list, allergies, past medical history, past surgical history        Independent Review Studies. I Personally reviewed all laboratory studies performed in the emergency department     Intervention/Re-evaluation intervention included fluid boluses, broad-spectrum antibiotics on reevaluation patient remained stable    Independent Clinician discussed with my supervising physician Dr. Hendrickson, discussed with the on-call hospitalist Dr. Singh who accepted for admission    Risk Stratification tools/clinical decision rules patient presented with hypotension, found to be positive for urinary tract infection, he met sepsis criteria, significant concern for worsening infection, acute kidney insufficiency, renal failure, endorgan damage, disability or death therefore multiple fluid boluses and broad-spectrum antibiotics were started patient was admitted for further care    Shared Decision Making discussed this plan of care with patient and family and they agree    Disposition patient stable for admission    Problems Addressed:  Acute cystitis without hematuria: complicated acute illness or injury  Sepsis, due to unspecified organism, unspecified whether acute organ dysfunction present: complicated acute illness or injury    Amount and/or Complexity of Data Reviewed  Independent  Historian: caregiver  External Data Reviewed: labs, radiology, ECG and notes.  Labs: ordered. Decision-making details documented in ED Course.  Radiology: ordered and independent interpretation performed. Decision-making details documented in ED Course.  ECG/medicine tests: ordered and independent interpretation performed. Decision-making details documented in ED Course.    Risk  Prescription drug management.  Decision regarding hospitalization.          Final diagnoses:   Sepsis, due to unspecified organism, unspecified whether acute organ dysfunction present   Acute cystitis without hematuria          Last Calvin Jr., PA-C  24      Electronically signed by Last Calvin Jr., PA-C at 24          Physician Progress Notes (all)        Valeria Wilkins MD at 24 0930              [facility] Medicine Services  PROGRESS NOTE    Patient Name: Darien Camarena  : 1935  MRN: [id number]    Date of Admission: 2024  Primary Care Physician: Pito Way MD    Subjective   Subjective     CC:  Dizziness    HPI:  Patient is an 88-year-old who presented to the emergency department with complaints of dizziness and hypotension.  He was also recently treated for UTI.  Given history of A-fib cardiology was consulted and medications adjusted.  Today he is feeling better and denies current dizziness.  Blood pressure noted to still be low less than 100 systolic.  Tachycardia also noted.      Objective   Objective     Vital Signs:   Temp:  [97.8 °F (36.6 °C)-98.6 °F (37 °C)] 98.6 °F (37 °C)  Heart Rate:  [] 114  Resp:  [16-20] 20  BP: ()/(62-98) 114/71     Physical Exam:  Constitutional: No acute distress, awake, alert  HENT: NCAT, mucous membranes moist  Respiratory: Clear to auscultation bilaterally, respiratory effort normal   Cardiovascular: Tachycardia, heart rate 102  Gastrointestinal: Positive bowel sounds, soft, nontender,  nondistended  Musculoskeletal: No bilateral ankle edema  Psychiatric: Appropriate affect, cooperative  Neurologic: Oriented x 3, strength symmetric in all extremities, Cranial Nerves grossly intact to confrontation, speech clear  Skin: No rashes      Results Reviewed:  LAB RESULTS:      Lab 01/26/24 0413 01/25/24 2329 01/25/24 2032 01/25/24 1813 01/25/24  1552   WBC 6.94  --   --   --  7.96   HEMOGLOBIN 11.3*  --   --   --  13.0   HEMATOCRIT 34.6*  --   --   --  39.4   PLATELETS 195  --   --   --  243   NEUTROS ABS  --   --   --   --  5.65   IMMATURE GRANS (ABS)  --   --   --   --  0.05   LYMPHS ABS  --   --   --   --  1.46   MONOS ABS  --   --   --   --  0.46   EOS ABS  --   --   --   --  0.27   MCV 96.6  --   --   --  98.0*   SED RATE  --   --   --   --  124*   CRP  --   --   --  1.18*  --    PROCALCITONIN  --   --   --  0.04  --    LACTATE  --  1.9 2.2*  --  2.9*         Lab 01/26/24 0413 01/25/24  1552   SODIUM 137 136   POTASSIUM 3.7 4.6   CHLORIDE 99 96*   CO2 28.0 27.0   ANION GAP 10.0 13.0   BUN 49* 52*   CREATININE 1.14 1.21   EGFR 62.2 57.9*   GLUCOSE 178* 138*   CALCIUM 8.9 9.3   MAGNESIUM 1.7 1.7         Lab 01/25/24  1552   TOTAL PROTEIN 7.3   ALBUMIN 3.4*   GLOBULIN 3.9   ALT (SGPT) 38   AST (SGOT) 41*   BILIRUBIN 0.6   ALK PHOS 240*         Lab 01/25/24 1813 01/25/24  1552   PROBNP  --  1,428.0   HSTROP T 42* 42*                 Brief Urine Lab Results  (Last result in the past 365 days)        Color   Clarity   Blood   Leuk Est   Nitrite   Protein   CREAT   Urine HCG        01/25/24 1913 Yellow   Cloudy   Negative   Moderate (2+)   Positive   Negative                   Microbiology Results Abnormal       None            XR Chest 1 View    Result Date: 1/25/2024  XR CHEST 1 VW Date of Exam: 1/25/2024 3:34 PM EST Indication: Chest Pain Protocol Comparison: Chest radiograph and chest CT 1/3/2024 Findings: Cardiomediastinal silhouette is unchanged. Similar left hemidiaphragm with left basilar  scarring/subsegmental atelectasis. No focal consolidation or overt pulmonary edema. No pleural effusion or pneumothorax. Osseous structures are unchanged.     Impression: Impression: No evidence of acute cardiopulmonary disease. Electronically Signed: Surya Lion MD  1/25/2024 3:52 PM EST  Workstation ID: [id number]     Results for orders placed during the hospital encounter of 01/12/24    Adult Transesophageal Echo 3D (FARHAD) W/ Cont If Necessary Per Protocol    Interpretation Summary    Left ventricular systolic function is mildly decreased. Estimated left ventricular EF = 40%    The left ventricular cavity is mildly dilated.    The left atrial cavity is dilated.    There is a 27mm Watchman device which appears well seated and well compressed. Images obtained with difficulty achieving good resolution of the borders but no periprosthetic flow was seen. No device related thrombus seen.    No aortic valve regurgitation or stenosis is present. There is mild thickening of the aortic valve. The aortic valve appears trileaflet.    There is mild, bileaflet mitral valve thickening present. Mild mitral valve regurgitation is present with a centrally-directed jet noted. No significant mitral valve stenosis is present.    The tricuspid valve is normal in structure. Trace tricuspid valve regurgitation is present.    Borderline dilation of the ascending aorta is present. Ascending aorta = 3.6 cm No dilation of the descending aorta present. There is moderate plaque in the descending aorta present.      Current medications:  Scheduled Meds:allopurinol, 300 mg, Oral, Daily  aspirin, 81 mg, Oral, Daily  clopidogrel, 75 mg, Oral, Daily  digoxin, 250 mcg, Oral, Daily  donepezil, 10 mg, Oral, Nightly  finasteride, 5 mg, Oral, Nightly  heparin (porcine), 5,000 Units, Subcutaneous, Q8H  lactobacillus acidophilus, 1 capsule, Oral, Daily  memantine, 10 mg, Oral, BID  meropenem, 1,000 mg, Intravenous, Once  meropenem, 1,000 mg, Intravenous,  Q8H  metoprolol tartrate, 12.5 mg, Oral, BID  midodrine, 5 mg, Oral, TID AC  multivitamin with minerals, 1 tablet, Oral, Nightly  pantoprazole, 40 mg, Oral, Q AM  pravastatin, 40 mg, Oral, Nightly  senna-docusate sodium, 2 tablet, Oral, BID  sertraline, 50 mg, Oral, Daily  sodium chloride, 10 mL, Intravenous, Q12H  tamsulosin, 0.4 mg, Oral, Daily  tiotropium bromide monohydrate, 2 puff, Inhalation, Daily - RT      Continuous Infusions:   PRN Meds:.  acetaminophen    albuterol    senna-docusate sodium **AND** polyethylene glycol **AND** bisacodyl **AND** bisacodyl    Calcium Replacement - Follow Nurse / BPA Driven Protocol    Magnesium Standard Dose Replacement - Follow Nurse / BPA Driven Protocol    Phosphorus Replacement - Follow Nurse / BPA Driven Protocol    Potassium Replacement - Follow Nurse / BPA Driven Protocol    sodium chloride    sodium chloride    sodium chloride    Assessment & Plan   Assessment & Plan     Active Hospital Problems    Diagnosis  POA    **Hypotension [I95.9]  Yes    Low left ventricular ejection fraction [R94.30]  Yes    Sepsis [A41.9]  Yes    Recurrent UTI [N39.0]  Yes    Presence of Watchman left atrial appendage closure device [Z95.818]  Yes    Stage 3 chronic kidney disease [N18.30]  Yes    Persistent atrial fibrillation [I48.19]  Yes    Enlarged prostate without lower urinary tract symptoms (luts) [N40.0]  Yes    Gastroesophageal reflux disease with esophagitis [K21.00]  Yes    Hyperlipidemia LDL goal <100 [E78.5]  Yes    Essential hypertension [I10]  Yes    Type 2 diabetes mellitus with stage 3 chronic kidney disease, without long-term current use of insulin [E11.22, N18.30]  Yes    Obstructive sleep apnea syndrome [G47.33]  Yes      Resolved Hospital Problems   No resolved problems to display.        Brief Hospital Course to date:  Darien Camarena is a 88 y.o. male admitted with symptomatic hypotension (dizziness) and tachycardia.  Recently treated for complicated UTI and noted  history of urinary retention requiring self in and out cath.    Symptomatic hypotension  Tachycardia  A-fib with Watchman device  Chronic systolic congestive heart failure  -Holding diuretics and antihypertensives except metoprolol at lower dose  -Added midodrine  -Cardiology consulted    CKD stage III    COPD    Enlarged prostate  Urinary retention  Recent UTI  -ID to see for discussion of antibiotics, currently on Merrem  -In and out cath every 8 and as needed  -Continue Flomax    Diabetes mellitus type 2    Dementia    Depression        Expected Discharge Location and Transportation: Discharge home  Expected Discharge   Expected Discharge Date: 1/27/2024; Expected Discharge Time:      DVT prophylaxis:  Medical DVT prophylaxis orders are present.         AM-PAC 6 Clicks Score (PT): 19 (01/26/24 4740)    CODE STATUS:   Code Status and Medical Interventions:   Ordered at: 01/25/24 2214     Code Status (Patient has no pulse and is not breathing):    CPR (Attempt to Resuscitate)     Medical Interventions (Patient has pulse or is breathing):    Full Support       Valeria Wilkins MD  01/26/24        Electronically signed by Valeria Wilkins MD at 01/26/24 1306          Consult Notes (all)        Nate Gomez PA at 01/26/24 1204        Consult Orders    1. Inpatient Infectious Diseases Consult [[id number]] ordered by Anthony Franklin MD at 01/25/24 2146                     [facility]    INFECTIOUS DISEASE CONSULT/INITIAL HOSPITAL VISIT    Darien Camarena  1935  [id number]    Date of consult: 1/26/2024    Admit date: 1/25/2024    Requesting Provider: Anthony Franklin MD  Evaluating physician: Brandon Philippe MD  Reason for Consultation: Possible recurrent UTI with h/o ESBL  Chief Complaint: Symptoms similar to recent hospitalization on 1/5 with possible UTI.      Subjective   History of present illness:  Patient is a  88 y.o. male, known to Dr. Last Og, with h/o Afib/Watchman  device, COPD, T2DM, CKD Stage III, STEVEN/CPAP, HTN, HLD, recurrent UTIs, ESBL, and BPH/urinary retention/self catheterization 4 times a day who was recently treated ESBL E.coli UTI with Invanz for 7 days.  He returned to [facility] ED on 1/25 for lightheadedness, fogginess, and warm sensation from sitting to standing for the past days PTA.  He denies dysuria or hematuria. On arrival, the patient is afebrile with , BP 81/62, and remains on room air with some hypoxia.  Initial labs were , lactic acid 2.9, proBNP 1428, WBC 8000 with 71% neutrophils, creatinine 1.21, CRP 1.15, and PCT 0.04.  A UA WBC was 21-50 with moderate LE and positive nitrite and culture positive for GNR. Blood cultures are pending.  A CXR showed no acute findings.  He is currently on Rocephin.  ID was asked on 1/26 to evaluate and manage his antibiotic therapy.     Past Medical History:   Diagnosis Date    Arrhythmia     Atrial fibrillation     Bilateral leg cramps     possible new medication related side effects    Chronic kidney disease     Clotting disorder     pt doesnt know about this    COPD (chronic obstructive pulmonary disease)     Coronary artery disease     Diabetes mellitus     doesnt check sugar    E. coli sepsis 06/23/2022    Enlarged prostate without lower urinary tract symptoms (luts) 06/20/2016    Full dentures     GERD (gastroesophageal reflux disease)     Gout     Hearing aid worn     bilat prn    History of colonoscopy 09/12/2012    History of radiation therapy 02/24/2023    SBRT LLL lung    [place] (hard of hearing)     hearing aids prn    Hyperlipidemia     Hypertension     Kidney stone     surgery x1    Lung cancer     STEVEN on CPAP     compliant with machine    PAF (paroxysmal atrial fibrillation)     Prostatism     Sleep apnea     CPAP HS    Urinary incontinence     Urinary tract infection     Wears glasses     readers       Past Surgical History:   Procedure Laterality Date    ATRIAL APPENDAGE EXCLUSION LEFT WITH  TRANSESOPHAGEAL ECHOCARDIOGRAM N/A 11/28/2023    Procedure: Atrial Appendage Occlusion;  Surgeon: Anthony Mcdaniel MD;  Location:  [facility];  Service: Cardiovascular;  Laterality: N/A;    CARDIAC CATHETERIZATION Left 09/29/2022    Procedure: Left Heart Cath;  Surgeon: Titus Oliveros IV, MD;  Location:  [facility];  Service: Cardiovascular;  Laterality: Left;    CARDIOVERSION      CATARACT EXTRACTION Bilateral     COLONOSCOPY      CYSTOSCOPY      ENDOSCOPY      possible    KIDNEY STONE SURGERY      x1       Pediatric History   Patient Parents    Not on file     Other Topics Concern    Not on file   Social History Narrative    Caffeine: 1 cup of decaff coffee daily        family history includes Alzheimer's disease in his mother; COPD in his father; Cancer in his father; Kidney disease in his father; Lung cancer in his father; No Known Problems in his daughter, daughter, and daughter.    Allergies   Allergen Reactions    Xarelto [Rivaroxaban] GI Bleeding     GI bleed    Penicillins Hives     Has tolerated cefepime, ceftriaxone       Immunization History   Administered Date(s) Administered    31-influenza Vac Quardvalent Preservativ 11/01/2018, 10/16/2019    COVID-19 (PFIZER) BIVALENT 12+YRS 12/22/2022    COVID-19 (PFIZER) Purple Cap Monovalent 01/26/2021, 02/19/2021    Fluzone High Dose =>65 Years ([employer] ONLY) 10/01/2016, 09/18/2017, 09/15/2018, 10/12/2019, 09/18/2020, 09/25/2021    Fluzone High-Dose 65+yrs 09/19/2020, 09/25/2021, 12/22/2022, 10/30/2023    Hepatitis A 09/15/2018, 11/01/2018    Pneumococcal Conjugate 13-Valent (PCV13) 10/26/2015    Pneumococcal Polysaccharide (PPSV23) 06/24/2006, 09/18/2020    Pneumococcal, Unspecified 11/30/2006    Shingrix 09/25/2021, 04/15/2023    Td (TDVAX) 06/24/2005    Tdap 06/14/2018       Medication:  @Scheduled Meds:allopurinol, 300 mg, Oral, Daily  aspirin, 81 mg, Oral, Daily  cefTRIAXone, 2,000 mg, Intravenous, Q24H  clopidogrel,  75 mg, Oral, Daily  digoxin, 250 mcg, Intravenous, Once  digoxin, 250 mcg, Oral, Daily  donepezil, 10 mg, Oral, Nightly  finasteride, 5 mg, Oral, Nightly  heparin (porcine), 5,000 Units, Subcutaneous, Q8H  lactobacillus acidophilus, 1 capsule, Oral, Daily  memantine, 10 mg, Oral, BID  metoprolol tartrate, 12.5 mg, Oral, BID  midodrine, 5 mg, Oral, TID AC  multivitamin with minerals, 1 tablet, Oral, Nightly  pantoprazole, 40 mg, Oral, Q AM  pravastatin, 40 mg, Oral, Nightly  senna-docusate sodium, 2 tablet, Oral, BID  sertraline, 50 mg, Oral, Daily  sodium chloride, 10 mL, Intravenous, Q12H  tamsulosin, 0.4 mg, Oral, Daily  tiotropium bromide monohydrate, 2 puff, Inhalation, Daily - RT      Continuous Infusions:   PRN Meds:.  acetaminophen    albuterol    senna-docusate sodium **AND** polyethylene glycol **AND** bisacodyl **AND** bisacodyl    Calcium Replacement - Follow Nurse / BPA Driven Protocol    Magnesium Standard Dose Replacement - Follow Nurse / BPA Driven Protocol    Phosphorus Replacement - Follow Nurse / BPA Driven Protocol    Potassium Replacement - Follow Nurse / BPA Driven Protocol    sodium chloride    sodium chloride    sodium chloride     Please refer to the medical record for a full medication list    Review of Systems:    Constitutional-- No Fever, chills or sweats.  Appetite decreased, and no malaise. No fatigue.  HEENT-- No new vision, hearing or throat complaints.  No epistaxis or oral sores.  Denies odynophagia or dysphagia. No headache, photophobia or neck stiffness.  CV-- No chest pain, palpitation or syncope  Resp-- No SOB/cough/Hemoptysis  GI- No nausea, vomiting, or diarrhea.  No hematochezia, melena, or hematemesis. Denies jaundice or chronic liver disease.  -- No dysuria, hematuria, or flank pain.  Denies hesitancy, urgency or burning with urination.  Self caths.  Has some back pain prior to arrival, but better now.   Lymph- no swollen lymph nodes in neck/axilla or groin.   Heme- No  "active bruising or bleeding; no Hx of DVT or PE.  MS-- no swelling or pain in the bones or joints of arms/legs.  No new back pain.  Neuro-- No acute focal weakness or numbness in the arms or legs.  No seizures.  Skin--No rashes or lesions    Physical Exam:   Vital Signs   Temp:  [97.8 °F (36.6 °C)-98.6 °F (37 °C)] 98.6 °F (37 °C)  Heart Rate:  [] 118  Resp:  [16-20] 20  BP: ()/(62-98) 82/62    Blood pressure (!) 82/62, pulse 118, temperature 98.6 °F (37 °C), temperature source Oral, resp. rate 20, height 190.5 cm (75\"), weight 112 kg (248 lb), SpO2 94%.  GENERAL: Awake and alert, in no acute distress. Appears older than stated age.  HEENT:  Normocephalic, atraumatic.  Oropharynx without thrush. Dentition in good repair. No cervical adenopathy. No neck masses.  Ears externally normal, Nose externally normal.  EYES: PERRL. No conjunctival injection. No icterus. EOM full.  LYMPHATICS: No lymphadenopathy of the neck or axillary or inguinal regions.   HEART: No murmur, gallop, or pericardial friction rub. Reg rate rhythm, No JVD at 45 degrees.  LUNGS: Clear to auscultation and percussion. No respiratory distress, no use of accessory muscles.  ABDOMEN: Soft, nontender, nondistended. No appreciable HSM.  Bowel sounds normal.  GENITAL: No external lesions, breasts without masses, back straight, no CVAT, rectal external without lesions.   SKIN: Warm and dry without cutaneous eruptions.  No nodules.    PSYCHIATRIC: Mental status lucid. No confusion.  EXT:  No cellulitic change.  NEURO: Oriented to name, CN 2 to 12 intact, DTR 1 + and symmetric, sensory intact to upper and lower extremities, motor 5/5 upper and lower extremity, cerebellar and gait not tested.      Results Review:   I reviewed the patient's new clinical results.  I reviewed the patient's new imaging results and agree with the interpretation.  I reviewed the patient's other test results and agree with the interpretation    Results from last 7 days " "  Lab Units 01/26/24 0413 01/25/24  1552   WBC 10*3/mm3 6.94 7.96   HEMOGLOBIN g/dL 11.3* 13.0   HEMATOCRIT % 34.6* 39.4   PLATELETS 10*3/mm3 195 243     Results from last 7 days   Lab Units 01/26/24  0413   SODIUM mmol/L 137   POTASSIUM mmol/L 3.7   CHLORIDE mmol/L 99   CO2 mmol/L 28.0   BUN mg/dL 49*   CREATININE mg/dL 1.14   GLUCOSE mg/dL 178*   CALCIUM mg/dL 8.9     Results from last 7 days   Lab Units 01/25/24  1552   ALK PHOS U/L 240*   BILIRUBIN mg/dL 0.6   ALT (SGPT) U/L 38   AST (SGOT) U/L 41*     Results from last 7 days   Lab Units 01/25/24  1552   SED RATE mm/hr 124*     Results from last 7 days   Lab Units 01/25/24  1813   CRP mg/dL 1.18*         Results from last 7 days   Lab Units 01/25/24  2329   LACTATE mmol/L 1.9     Estimated Creatinine Clearance: 61.7 mL/min (by C-G formula based on SCr of 1.14 mg/dL).     Procalitonin Results:      Lab 01/25/24 1813   PROCALCITONIN 0.04      Brief Urine Lab Results  (Last result in the past 365 days)        Color   Clarity   Blood   Leuk Est   Nitrite   Protein   CREAT   Urine HCG        01/25/24 1913 Yellow   Cloudy   Negative   Moderate (2+)   Positive   Negative                  No results found for: \"SITE\", \"ALLENTEST\", \"PHART\", \"ESC1JDY\", \"PO2ART\", \"APR1BPV\", \"BASEEXCESS\", \"V7YWDMZI\", \"HGBBG\", \"HCTABG\", \"OXYHEMOGLOBI\", \"METHHGBN\", \"CARBOXYHGB\", \"CO2CT\", \"BAROMETRIC\", \"MODALITY\", \"FIO2\"     Microbiology:  Microbiology Results (last 10 days)       Procedure Component Value - Date/Time    Urine Culture - Urine, Urine, Clean Catch [[id number]]  (Abnormal) Collected: 01/25/24 1913    Lab Status: Preliminary result Specimen: Urine, Clean Catch Updated: 01/26/24 1142     Urine Culture >100,000 CFU/mL Gram Negative Bacilli    Narrative:      Colonization of the urinary tract without infection is common. Treatment is discouraged unless the patient is symptomatic, pregnant, or undergoing an invasive urologic procedure.                Radiology:  Imaging Results " (Last 72 Hours)       Procedure Component Value Units Date/Time    XR Chest 1 View [[id number]] Collected: 01/25/24 1550     Updated: 01/25/24 1555    Narrative:      XR CHEST 1 VW    Date of Exam: 1/25/2024 3:34 PM EST    Indication: Chest Pain Protocol    Comparison: Chest radiograph and chest CT 1/3/2024    Findings:  Cardiomediastinal silhouette is unchanged. Similar left hemidiaphragm with left basilar scarring/subsegmental atelectasis. No focal consolidation or overt pulmonary edema. No pleural effusion or pneumothorax. Osseous structures are unchanged.      Impression:      Impression:  No evidence of acute cardiopulmonary disease.       Electronically Signed: Surya Lion MD    1/25/2024 3:52 PM EST    Workstation ID: [id number]            IMPRESSION:   Recurrent GNR UTI with h/o ESBL  Benign prostatic hypertrophy with urinary retention/self catheterizations 4 times a day.  Paroxysmal atrial fibrillation/Watchman procedure/Plavix  Type 2 diabetes mellitus  Chronic kidney disease Stage IIIa  Obesity  Essential hypertension  Obstructive sleep apnea/CPAP  Penicillin allergy (hives). Tolerated Rocephin in past.       RECOMMENDATIONS:  Diagnostically, follow blood and urine cultures, physical examination, imaging, CBC, CMP, ESR, CRP, procalcitonin, lactic acid.  Therapeutically, Stop Rocephin and change to Merrem 1 GM IV Q8H pending cultures.  He will likely need about 7 days of antibiotics until 2/2/24.        I discussed the patient's findings and my recommendations with patient, nursing staff, and primary care team    Thank you for asking me to see Darien Camarena.  Our group would be pleased to follow this patient over the course of their hospitalization and assist with outpatient antimicrobial therapy, as indicated.  Further recommendations depend on the results of the cultures and clinical course.    Brandon Philippe MD saw and examined patient, verified hx and PE, read all radiographic studies, reviewed  labs and micro data, and formulated dx, plan for treatment and all medical decision making.      Nate Gomez PA-C for MD Nate Perry PA  1/26/2024      Electronically signed by Nate Gomez PA at 01/26/24 1313       Jose Dennis MD at 01/26/24 0887        Consult Orders    1. Inpatient Cardiology Consult [[id number]] ordered by Anthony Franklin MD                 Inpatient Cardiology Consult  Consult performed by: Blanquita Ansari APRN  Consult ordered by: Anthony Franklin MD        [facility]  Cardiovascular Consultation Note    Reason for consultation: Hypotension  Subjective   Patient is an 88-year-old gentleman with a history of persistent atrial fibrillation status post Watchman device, stage III chronic kidney disease, type 2 diabetes mellitus, recurrent UTI's with nephrostomy tube in the past, hypertension and hyperlipidemia who we are being asked to consult for assistance with medication adjustments after patient presented to [facility] yesterday with dizziness, and weakness and was found to be hypotensive.  Blood pressure on arrival was low at 81/62.  On admission there was concern for potential urosepsis as the patient had just been hospitalized and discharged earlier on 1/5 when he presented with similar symptoms and was found to have evidence of UTI for which infectious disease followed.  This admission his lactic acid was elevated at 2.9, but WBCs were within normal limits and procalcitonin was negative.  Urinalysis this admission shows ongoing UTI.  He has been treated with IV fluids and started on antibiotics.  CT angiogram was negative for pulmonary embolism and no acute CHF.  EKG showed atrial fibrillation at 105 bpm.  He is currently in atrial fibrillation with rates in the low 100s on telemetry.  His metoprolol dose has been decreased from 100 twice daily to 25 twice daily.  His valsartan, metolazone and  Lasix are on hold.  Blood pressure low this morning at 82/62 and he has been started on midodrine at 5 mg 3 times daily by the hospitalist.  He is currently lying in bed breathing easy on room air.  He denies any chest pain or shortness of breath.  The patient failed sotalol therapy in the past he had a return of his atrial fibrillation in 2022.  He underwent external cardioversion that was successful restoring normal sinus rhythm and started on amiodarone which kept him in normal sinus rhythm until June 2023.  Cardioversion was attempted at that time but unsuccessful.  He was ultimately referred to EP for his persistent atrial fibrillation as well as intolerance of Eliquis due to ongoing hematuria.  He was evaluated by Dr. Mcdaniel with EP and amiodarone was discontinued in September as it was felt he was asymptomatic with his atrial fibrillation and had previously had no cardiomyopathy and for concerns of amiodarone toxicityAssociate with long-term use.  Rate control strategy for his atrial fibrillation was chosen and metoprolol dosing was increased.  He ultimately underwent a Watchman device in November due to being intolerant to anticoagulation from recurrent hematuria.  A follow-up FARHAD reformed earlier this month showed well-seated device but suggested a reduced LVEF of 40%.  He did have a cardiac catheterization in September 2022 for shortness of breath and coronary angiography showed minimal CAD.  He has no signs or symptoms of heart failure and appears euvolemic.  He denies chest pain or shortness of breath.  His only complaint is of intermittent dizziness/lightheadedness and low blood pressure.  Orthostatics were positive this admission.  He remains hypotensive with a systolic in the 80s despite having received over a liter of fluid.      Cardiac risk factors: Advanced age, hyperlipidemia    Past medical and surgical history, social and family history reviewed in EMR.    REVIEW OF SYSTEMS:   H&P ROS reviewed and  pertinent CV ROS as noted in HPI.    Objective     Vital Sign Min/Max for last 24 hours  Temp  Min: 97.8 °F (36.6 °C)  Max: 98.6 °F (37 °C)   BP  Min: 81/62  Max: 138/97   Pulse  Min: 23  Max: 125   Resp  Min: 16  Max: 20   SpO2  Min: 84 %  Max: 97 %   No data recorded      Intake/Output Summary (Last 24 hours) at 2024 1138  Last data filed at 2024 0830  Gross per 24 hour   Intake 1600 ml   Output 1300 ml   Net 300 ml           Constitutional:       General: Awake.      Appearance: Healthy appearance.   Neck:      Vascular: No JVD.   Pulmonary:      Effort: Pulmonary effort is normal.      Breath sounds: Normal breath sounds.   Cardiovascular:      Tachycardia present. Irregularly irregular rhythm.      Murmurs: There is no murmur.   Pulses:     Intact distal pulses.   Edema:     Peripheral edema absent.   Skin:     General: Skin is warm and dry.   Neurological:      Mental Status: Alert and oriented to person, place and time.   Psychiatric:         Behavior: Behavior is cooperative.          EK2024 A-fib with RVR at 105 bpm    Lab Review:   Labs reviewed in the electronic medical record.  Pertinent findings include:  Lab Results   Component Value Date    GLUCOSE 178 (H) 2024    BUN 49 (H) 2024    CREATININE 1.14 2024    EGFR 62.2 2024    BCR 43.0 (H) 2024    K 3.7 2024    CO2 28.0 2024    CALCIUM 8.9 2024    ALBUMIN 3.4 (L) 2024    BILITOT 0.6 2024    AST 41 (H) 2024    ALT 38 2024     Lab Results   Component Value Date    WBC 6.94 2024    HGB 11.3 (L) 2024    HCT 34.6 (L) 2024    MCV 96.6 2024     2024     Lab Results   Component Value Date    CHOL 98 10/30/2023    TRIG 127 10/30/2023    HDL 34 (L) 10/30/2023    LDL 41 10/30/2023             Assessment   Active Hospital Problems    Diagnosis     **Hypotension     Sepsis     Recurrent UTI     Stage 3 chronic kidney disease     Persistent  atrial fibrillation      Diagnosed August 2012.   FARHAD-guided ECV, 8/17/2012  CHADS-VASc 5 (age > 75, CAD, HTN, DM)  Successful external cardioversion for asymptomatic atrial fibrillation with RVR, 10/25/18  Successful cardioversion for atrial fibrillation RVR, 3/6/19.  Sotalol increased  Minimally symptomatic atrial fibrillation with cardioversion and the ER, 10/31/2019  ED cardioversion for A. fib with RVR, 7/21/2020  ED cardioversion for asymptomatic A. fib with RVR, 12/27/2020   ED cardioversion for asymptomatic A. fib with RVR x 2  occasions, March 2021  Transition from sotalol to amiodarone, 4/14/2021  Successful FARAHD/ECV May 3, 2021  Successful cardioversion, 8/5/2022  Unsuccessful cardioversion 6/2023  Echo (8/8/2022): EF 50%, anatomically and functionally normal valves  Successful insertion of 27 mm Watchman device/left atrial pended occlusive device with Dr. Mcdaniel 11/28/2023  FARHAD (1/12/2024): LVEF = 40%.  27 mm Watchman device well-seated.  No thrombus or periprosthetic flow.  Mild MR but no significant valvular abnormality        Type 2 diabetes mellitus with stage 3 chronic kidney disease, without long-term current use of insulin     Low left ventricular ejection fraction     Presence of Watchman left atrial appendage closure device     Gastroesophageal reflux disease with esophagitis     Hyperlipidemia LDL goal <100      Moderate intensity statin therapy reasonable due to diabetic status      Essential hypertension      Target blood pressure <130/80 mmHg      Obstructive sleep apnea syndrome      Compliant with CPAP      Enlarged prostate without lower urinary tract symptoms (luts)           Plan       Hypotension  In the setting of possible urosepsis  Continue to hold metolazone, Lasix and valsartan  Agree with midodrine 5 mg 3 times daily as started by hospitalist  Decrease metoprolol to tartrate 12.5 mg twice daily    Persistent atrial fibrillation/recent Watchman device  Currently in A-fib with rates  "not well-controlled  Has failed sotalol therapy in the past.  Evaluated by EP and amiodarone discontinued due to concerns of toxicity.  Metoprolol was increased to 100 twice daily for rate control as it was felt patient was asymptomatic with his A-fib   Watchman device placed 11/28 by Dr. Mcdaniel as patient was having recurrent hematuria and intolerant to anticoagulation  Defer NOAC due to Watchman device as recent FARHAD showed well-seated device  Continue aspirin and Plavix for Watchman device  Start digoxin 250 mcg daily for better rate control of atrial fibrillation  May need monitor at discharge to assess for rate control of atrial fibrillation    Hyperlipidemia  Pravastatin 40 mg daily    Reduced LVEF/systolic heart failure  FARHAD performed earlier this month showed a reduced LVEF of 40% which appears new  Could be tachycardic induced from rates not being well-controlled from A-fib as patient had minimal CAD noted on cath 2022   Currently appears euvolemic and compensated  Repeat limited echo for reevaluation of LVEF      UTI/recurrent in nature  Management per hospitalist  Blood cultures pending    BERYL Paiz  I discussed the case with the physician extender as well as the family and the patient.  The patient denies dyspnea with activities of daily living but does get short of breath if he does more than just walk around the house.  His daughter also notes occasional dyspnea.  The patient does not feel his heart racing.  He has had no chest pain.  He states before come to the hospital he would get lightheaded when he stood up.  His blood pressures been tenuous so all his medications have been held except metoprolol.  He got a dose of metoprolol last evening and today but his systolic was as low as 82.  He was started on midodrine therapy.  On physical exam neck has no JVP  Cardiovascular-tachycardic with no murmur and no edema to palpation  Respiratory-equal bilateral symmetrical expansion\" " bilaterally  Lower extremities no edema  Neuro-facial expressions are symmetrical moves all 4 extremities  This time I recommend IV digoxin since his resting rate is fast.  Will also put him on p.o. digoxin since his blood pressure is tenuous.  If his blood pressure goes up with midodrine therapy we can add his beta-blockers back in but if his blood pressure remains tenuous we could add Corlanor for some heart rate control and could also consider an AV prabhu ablation and pacemaker insertion if the above medical therapy fails    Electronically signed by Jose Dennis MD at 01/26/24 4668

## 2024-01-26 NOTE — THERAPY EVALUATION
Patient Name: Darien Camarena  : 1935    MRN: [id number]                              Today's Date: 2024       Admit Date: 2024    Visit Dx:     ICD-10-CM ICD-9-CM   1. Sepsis, due to unspecified organism, unspecified whether acute organ dysfunction present  A41.9 038.9     995.91   2. Acute cystitis without hematuria  N30.00 595.0     Patient Active Problem List   Diagnosis    Atopic rhinitis    Benign (familial) paraproteinemia    Type 2 diabetes mellitus with stage 3 chronic kidney disease, without long-term current use of insulin    Enlarged prostate without lower urinary tract symptoms (luts)    Gastroesophageal reflux disease with esophagitis    Polyp of gallbladder    Gout    Hyperlipidemia LDL goal <100    Essential hypertension    Nephrolithiasis    Obstructive sleep apnea syndrome    Persistent atrial fibrillation    Stage 3 chronic kidney disease    Long term current use of antiarrhythmic medical therapy    Hydronephrosis    Coronary artery disease involving native coronary artery of native heart with angina pectoris    Malignant neoplasm of lower lobe of left lung    Chronic bilateral low back pain without sciatica    Other microscopic hematuria    H/O: GI bleed    Presence of Watchman left atrial appendage closure device    Recurrent UTI    Acute kidney injury superimposed on chronic kidney disease    Sepsis    Hypotension    Low left ventricular ejection fraction     Past Medical History:   Diagnosis Date    Arrhythmia     Atrial fibrillation     Bilateral leg cramps     possible new medication related side effects    Chronic kidney disease     Clotting disorder     pt doesnt know about this    COPD (chronic obstructive pulmonary disease)     Coronary artery disease     Diabetes mellitus     doesnt check sugar    E. coli sepsis 2022    Enlarged prostate without lower urinary tract symptoms (luts) 2016    Full dentures     GERD (gastroesophageal reflux disease)     Gout      Hearing aid worn     bilat prn    History of colonoscopy 09/12/2012    History of radiation therapy 02/24/2023    SBRT LLL lung    [place] (hard of hearing)     hearing aids prn    Hyperlipidemia     Hypertension     Kidney stone     surgery x1    Lung cancer     STEVEN on CPAP     compliant with machine    PAF (paroxysmal atrial fibrillation)     Prostatism     Sleep apnea     CPAP HS    Urinary incontinence     Urinary tract infection     Wears glasses     readers     Past Surgical History:   Procedure Laterality Date    ATRIAL APPENDAGE EXCLUSION LEFT WITH TRANSESOPHAGEAL ECHOCARDIOGRAM N/A 11/28/2023    Procedure: Atrial Appendage Occlusion;  Surgeon: Anthony Mcdaniel MD;  Location:  [facility];  Service: Cardiovascular;  Laterality: N/A;    CARDIAC CATHETERIZATION Left 09/29/2022    Procedure: Left Heart Cath;  Surgeon: Titus lOiveros IV, MD;  Location:  [facility];  Service: Cardiovascular;  Laterality: Left;    CARDIOVERSION      CATARACT EXTRACTION Bilateral     COLONOSCOPY      CYSTOSCOPY      ENDOSCOPY      possible    KIDNEY STONE SURGERY      x1      General Information       Row Name 01/26/24 1147          Physical Therapy Time and Intention    Document Type evaluation  -KG     Mode of Treatment physical therapy  -KG       Row Name 01/26/24 1147          General Information    Patient Profile Reviewed yes  -KG     Prior Level of Function independent:;all household mobility;ADL's  has cane but only uses out of house  -KG     Existing Precautions/Restrictions other (see comments)  HR  -KG     Barriers to Rehab medically complex  -KG       Row Name 01/26/24 1147          Living Environment    People in Home alone  -KG       Row Name 01/26/24 1147          Home Main Entrance    Number of Stairs, Main Entrance one  -KG     Stair Railings, Main Entrance none  -KG       Row Name 01/26/24 1147          Stairs Within Home, Primary    Number of Stairs, Within Home, Primary  none  -KG       Row Name 01/26/24 1147          Cognition    Orientation Status (Cognition) oriented x 3  -KG       Row Name 01/26/24 1147          Safety Issues, Functional Mobility    Safety Issues Affecting Function (Mobility) awareness of need for assistance;safety precautions follow-through/compliance;insight into deficits/self-awareness  -KG     Impairments Affecting Function (Mobility) balance;endurance/activity tolerance;strength  -KG               User Key  (r) = Recorded By, (t) = Taken By, (c) = Cosigned By      Initials Name Provider Type    DONNIE Joselyn Novak Physical Therapist                   Mobility       Row Name 01/26/24 1152          Bed Mobility    Bed Mobility supine-sit-supine  -KG     Supine-Sit-Supine [place] (Bed Mobility) standby assist  -KG     Assistive Device (Bed Mobility) head of bed elevated  -KG       Row Name 01/26/24 1152          Transfers    Comment, (Transfers) CGA, SC  -KG       Row Name 01/26/24 1152          Sit-Stand Transfer    Sit-Stand [place] (Transfers) contact guard  -KG     Assistive Device (Sit-Stand Transfers) cane, straight  -KG       Row Name 01/26/24 1152          Gait/Stairs (Locomotion)    [place] Level (Gait) verbal cues;contact guard  -KG     Assistive Device (Gait) cane, straight  -KG     Distance in Feet (Gait) 150  -KG     Bilateral Gait Deviations heel strike decreased;forward flexed posture;knee buckling bilaterally  -KG     Comment, (Gait/Stairs) Pt able to ambulate 150', SC, CGA with cues for posture and stability, mild bilateral knee buckling due to weakness  -KG               User Key  (r) = Recorded By, (t) = Taken By, (c) = Cosigned By      Initials Name Provider Type    DONNIE Joselyn Novak Physical Therapist                   Obj/Interventions       Row Name 01/26/24 1155          Range of Motion Comprehensive    General Range of Motion no range of motion deficits identified  -KG       Row Name 01/26/24 1155          Strength  Comprehensive (MMT)    General Manual Muscle Testing (MMT) Assessment lower extremity strength deficits identified  -KG     Comment, General Manual Muscle Testing (MMT) Assessment 3+/5 BLE  -KG       Row Name 01/26/24 1155          Motor Skills    Therapeutic Exercise other (see comments)  LAQ, SLR, ankle pumps  -KG       Row Name 01/26/24 1155          Balance    Balance Assessment standing dynamic balance  -KG     Dynamic Standing Balance contact guard  -KG     Position/Device Used, Standing Balance supported;cane, straight  -KG     Balance Interventions standing;sit to stand;weight shifting activity  -KG               User Key  (r) = Recorded By, (t) = Taken By, (c) = Cosigned By      Initials Name Provider Type    KG Joselyn Novak Physical Therapist                   Goals/Plan       Row Name 01/26/24 1157          Bed Mobility Goal 1 (PT)    Activity/Assistive Device (Bed Mobility Goal 1, PT) sit to supine/supine to sit  -KG     [place] Level/Cues Needed (Bed Mobility Goal 1, PT) supervision required  -KG     Time Frame (Bed Mobility Goal 1, PT) short term goal (STG);3 days  -KG     Progress/Outcomes (Bed Mobility Goal 1, PT) continuing progress toward goal  -KG       Row Name 01/26/24 1157          Transfer Goal 1 (PT)    Activity/Assistive Device (Transfer Goal 1, PT) sit-to-stand/stand-to-sit;bed-to-chair/chair-to-bed;walker, rolling  -KG     [place] Level/Cues Needed (Transfer Goal 1, PT) standby assist  -KG     Time Frame (Transfer Goal 1, PT) short term goal (STG);3 days  -KG     Progress/Outcome (Transfer Goal 1, PT) continuing progress toward goal  -KG       Row Name 01/26/24 1157          Gait Training Goal 1 (PT)    Activity/Assistive Device (Gait Training Goal 1, PT) gait (walking locomotion);walker, rolling  -KG     [place] Level (Gait Training Goal 1, PT) modified independence  -KG     Distance (Gait Training Goal 1, PT) 350  -KG     Time Frame (Gait Training Goal 1, PT) long  term goal (LTG);10 days  -KG     Progress/Outcome (Gait Training Goal 1, PT) continuing progress toward goal  -KG               User Key  (r) = Recorded By, (t) = Taken By, (c) = Cosigned By      Initials Name Provider Type    Joselyn Oedn Physical Therapist                   Clinical Impression       Row Name 01/26/24 1156          Pain    Pretreatment Pain Rating 0/10 - no pain  -KG     Posttreatment Pain Rating 0/10 - no pain  -KG       Row Name 01/26/24 1156          Plan of Care Review    Plan of Care Reviewed With patient;daughter  -KG     Progress no change  -KG     Outcome Evaluation PT eval performed: Pt presenting with mild strength and mobility deficits.  Pt able to ambulate 150', SC, CGA with cues for posture and stability, mild bilateral knee buckling due to weakness.  Pt would do much better with a walker.  Pt requesting OP PT with KORT at d/c.  Recommend home with assist and OP PT  -KG       Row Name 01/26/24 1156          Therapy Assessment/Plan (PT)    Rehab Potential (PT) good, to achieve stated therapy goals  -KG     Criteria for Skilled Interventions Met (PT) yes;meets criteria;skilled treatment is necessary  -KG               User Key  (r) = Recorded By, (t) = Taken By, (c) = Cosigned By      Initials Name Provider Type    Joselyn Oden Physical Therapist                   Outcome Measures       Row Name 01/26/24 1159          How much help from another person do you currently need...    Turning from your back to your side while in flat bed without using bedrails? 4  -KG     Moving from lying on back to sitting on the side of a flat bed without bedrails? 3  -KG     Moving to and from a bed to a chair (including a wheelchair)? 3  -KG     Standing up from a chair using your arms (e.g., wheelchair, bedside chair)? 3  -KG     Climbing 3-5 steps with a railing? 3  -KG     To walk in hospital room? 3  -KG     AM-PAC 6 Clicks Score (PT) 19  -KG     Highest Level of Mobility Goal 6 -->  Walk 10 steps or more  -KG       Row Name 01/26/24 1159          Functional Assessment    Outcome Measure Options AM-PAC 6 Clicks Basic Mobility (PT)  -KG               User Key  (r) = Recorded By, (t) = Taken By, (c) = Cosigned By      Initials Name Provider Type    Joselyn Oden Physical Therapist                                 Physical Therapy Education       Title: PT OT SLP Therapies (In Progress)       Topic: Physical Therapy (Not Started)       Point: Mobility training (Not Started)       Learner Progress:  Not documented in this visit.              Point: Home exercise program (Not Started)       Learner Progress:  Not documented in this visit.              Point: Body mechanics (Not Started)       Learner Progress:  Not documented in this visit.              Point: Precautions (Not Started)       Learner Progress:  Not documented in this visit.                                  PT Recommendation and Plan     Plan of Care Reviewed With: patient, daughter  Progress: no change  Outcome Evaluation: PT eval performed: Pt presenting with mild strength and mobility deficits.  Pt able to ambulate 150', SC, CGA with cues for posture and stability, mild bilateral knee buckling due to weakness.  Pt would do much better with a walker.  Pt requesting OP PT with KORT at d/c.  Recommend home with assist and OP PT     Time Calculation:         PT Charges       Row Name 01/26/24 1159             Time Calculation    Start Time 1045  -KG      PT Received On 01/26/24  -KG      PT Goal Re-Cert Due Date 02/05/24  -KG         Time Calculation- PT    Total Timed Code Minutes- PT 10 minute(s)  -KG         Timed Charges    33948 - PT Therapeutic Exercise Minutes 10  -KG         Total Minutes    Timed Charges Total Minutes 10  -KG       Total Minutes 10  -KG                User Key  (r) = Recorded By, (t) = Taken By, (c) = Cosigned By      Initials Name Provider Type    Joselyn Oden Physical Therapist                   Therapy Charges for Today       Code Description Service Date Service Provider Modifiers Qty    34735986262 HC PT THER PROC EA 15 MIN 1/26/2024 Joselyn Novak GP 1    [id number] HC PT EVAL LOW COMPLEXITY 3 1/26/2024 Joselyn Novak GP 1            PT G-Codes  Outcome Measure Options: AM-PAC 6 Clicks Basic Mobility (PT)  AM-PAC 6 Clicks Score (PT): 19  PT Discharge Summary  Anticipated Discharge Disposition (PT): home with outpatient therapy services, home with assist    Joselyn Novak  1/26/2024

## 2024-01-27 ENCOUNTER — APPOINTMENT (OUTPATIENT)
Dept: CARDIOLOGY | Facility: HOSPITAL | Age: 89
End: 2024-01-27
Payer: MEDICARE

## 2024-01-27 LAB
ANION GAP SERPL CALCULATED.3IONS-SCNC: 8 MMOL/L (ref 5–15)
BACTERIA BLD CULT: ABNORMAL
BACTERIA SPEC AEROBE CULT: ABNORMAL
BH CV ECHO MEAS - AO MAX PG: 6.7 MMHG
BH CV ECHO MEAS - AO MEAN PG: 4 MMHG
BH CV ECHO MEAS - AO ROOT DIAM: 3.6 CM
BH CV ECHO MEAS - AO V2 MAX: 129 CM/SEC
BH CV ECHO MEAS - AO V2 VTI: 19.6 CM
BH CV ECHO MEAS - AVA(I,D): 2.9 CM2
BH CV ECHO MEAS - EDV(CUBED): 166.4 ML
BH CV ECHO MEAS - EDV(MOD-SP2): 54.1 ML
BH CV ECHO MEAS - EDV(MOD-SP4): 92.8 ML
BH CV ECHO MEAS - EF(MOD-BP): 47.8 %
BH CV ECHO MEAS - EF(MOD-SP2): 33.5 %
BH CV ECHO MEAS - EF(MOD-SP4): 56.6 %
BH CV ECHO MEAS - ESV(CUBED): 79.5 ML
BH CV ECHO MEAS - ESV(MOD-SP2): 36 ML
BH CV ECHO MEAS - ESV(MOD-SP4): 40.3 ML
BH CV ECHO MEAS - FS: 21.8 %
BH CV ECHO MEAS - IVS/LVPW: 0.58 CM
BH CV ECHO MEAS - IVSD: 0.7 CM
BH CV ECHO MEAS - LA DIMENSION: 4.4 CM
BH CV ECHO MEAS - LAT PEAK E' VEL: 10.2 CM/SEC
BH CV ECHO MEAS - LV DIASTOLIC VOL/BSA (35-75): 38.7 CM2
BH CV ECHO MEAS - LV MASS(C)D: 199.3 GRAMS
BH CV ECHO MEAS - LV MAX PG: 2.8 MMHG
BH CV ECHO MEAS - LV MEAN PG: 1 MMHG
BH CV ECHO MEAS - LV SYSTOLIC VOL/BSA (12-30): 16.8 CM2
BH CV ECHO MEAS - LV V1 MAX: 84.2 CM/SEC
BH CV ECHO MEAS - LV V1 VTI: 12.6 CM
BH CV ECHO MEAS - LVIDD: 5.5 CM
BH CV ECHO MEAS - LVIDS: 4.3 CM
BH CV ECHO MEAS - LVOT AREA: 4.5 CM2
BH CV ECHO MEAS - LVOT DIAM: 2.4 CM
BH CV ECHO MEAS - LVPWD: 1.2 CM
BH CV ECHO MEAS - MED PEAK E' VEL: 12.3 CM/SEC
BH CV ECHO MEAS - MV DEC SLOPE: 397 CM/SEC2
BH CV ECHO MEAS - MV DEC TIME: 0.21 SEC
BH CV ECHO MEAS - MV E MAX VEL: 81.5 CM/SEC
BH CV ECHO MEAS - PA ACC TIME: 0.12 SEC
BH CV ECHO MEAS - PI END-D VEL: 46.6 CM/SEC
BH CV ECHO MEAS - SI(MOD-SP2): 7.5 ML/M2
BH CV ECHO MEAS - SI(MOD-SP4): 21.9 ML/M2
BH CV ECHO MEAS - SV(LVOT): 57 ML
BH CV ECHO MEAS - SV(MOD-SP2): 18.1 ML
BH CV ECHO MEAS - SV(MOD-SP4): 52.5 ML
BH CV ECHO MEAS - TR MAX PG: 20.3 MMHG
BH CV ECHO MEAS - TR MAX VEL: 225 CM/SEC
BH CV ECHO MEASUREMENTS AVERAGE E/E' RATIO: 7.24
BH CV VAS BP LEFT ARM: NORMAL MMHG
BH CV XLRA - RV BASE: 3.6 CM
BH CV XLRA - RV LENGTH: 6.9 CM
BH CV XLRA - RV MID: 2.7 CM
BOTTLE TYPE: ABNORMAL
BUN SERPL-MCNC: 38 MG/DL (ref 8–23)
BUN/CREAT SERPL: 40 (ref 7–25)
CALCIUM SPEC-SCNC: 8.9 MG/DL (ref 8.6–10.5)
CHLORIDE SERPL-SCNC: 100 MMOL/L (ref 98–107)
CO2 SERPL-SCNC: 31 MMOL/L (ref 22–29)
CREAT SERPL-MCNC: 0.95 MG/DL (ref 0.76–1.27)
D-LACTATE SERPL-SCNC: 1.2 MMOL/L (ref 0.5–2)
EGFRCR SERPLBLD CKD-EPI 2021: 77.5 ML/MIN/1.73
GLUCOSE BLDC GLUCOMTR-MCNC: 195 MG/DL (ref 70–130)
GLUCOSE BLDC GLUCOMTR-MCNC: 223 MG/DL (ref 70–130)
GLUCOSE BLDC GLUCOMTR-MCNC: 265 MG/DL (ref 70–130)
GLUCOSE SERPL-MCNC: 215 MG/DL (ref 65–99)
LEFT ATRIUM VOLUME INDEX: 31.5 ML/M2
MAGNESIUM SERPL-MCNC: 1.7 MG/DL (ref 1.6–2.4)
POTASSIUM SERPL-SCNC: 4 MMOL/L (ref 3.5–5.2)
QT INTERVAL: 320 MS
QTC INTERVAL: 452 MS
SODIUM SERPL-SCNC: 139 MMOL/L (ref 136–145)

## 2024-01-27 PROCEDURE — 93325 DOPPLER ECHO COLOR FLOW MAPG: CPT

## 2024-01-27 PROCEDURE — 99232 SBSQ HOSP IP/OBS MODERATE 35: CPT | Performed by: FAMILY MEDICINE

## 2024-01-27 PROCEDURE — 93308 TTE F-UP OR LMTD: CPT | Performed by: INTERNAL MEDICINE

## 2024-01-27 PROCEDURE — 80048 BASIC METABOLIC PNL TOTAL CA: CPT | Performed by: STUDENT IN AN ORGANIZED HEALTH CARE EDUCATION/TRAINING PROGRAM

## 2024-01-27 PROCEDURE — 93321 DOPPLER ECHO F-UP/LMTD STD: CPT

## 2024-01-27 PROCEDURE — 25010000002 HEPARIN (PORCINE) PER 1000 UNITS: Performed by: STUDENT IN AN ORGANIZED HEALTH CARE EDUCATION/TRAINING PROGRAM

## 2024-01-27 PROCEDURE — 94664 DEMO&/EVAL PT USE INHALER: CPT

## 2024-01-27 PROCEDURE — 83605 ASSAY OF LACTIC ACID: CPT | Performed by: FAMILY MEDICINE

## 2024-01-27 PROCEDURE — 82948 REAGENT STRIP/BLOOD GLUCOSE: CPT

## 2024-01-27 PROCEDURE — 93321 DOPPLER ECHO F-UP/LMTD STD: CPT | Performed by: INTERNAL MEDICINE

## 2024-01-27 PROCEDURE — 87040 BLOOD CULTURE FOR BACTERIA: CPT | Performed by: FAMILY MEDICINE

## 2024-01-27 PROCEDURE — 83735 ASSAY OF MAGNESIUM: CPT | Performed by: STUDENT IN AN ORGANIZED HEALTH CARE EDUCATION/TRAINING PROGRAM

## 2024-01-27 PROCEDURE — 63710000001 INSULIN LISPRO (HUMAN) PER 5 UNITS: Performed by: FAMILY MEDICINE

## 2024-01-27 PROCEDURE — 93325 DOPPLER ECHO COLOR FLOW MAPG: CPT | Performed by: INTERNAL MEDICINE

## 2024-01-27 PROCEDURE — 25010000002 MEROPENEM PER 100 MG: Performed by: PHYSICIAN ASSISTANT

## 2024-01-27 PROCEDURE — 93308 TTE F-UP OR LMTD: CPT

## 2024-01-27 PROCEDURE — 99232 SBSQ HOSP IP/OBS MODERATE 35: CPT | Performed by: INTERNAL MEDICINE

## 2024-01-27 PROCEDURE — 94799 UNLISTED PULMONARY SVC/PX: CPT

## 2024-01-27 RX ORDER — DEXTROSE MONOHYDRATE 25 G/50ML
25 INJECTION, SOLUTION INTRAVENOUS
Status: DISCONTINUED | OUTPATIENT
Start: 2024-01-27 | End: 2024-01-30 | Stop reason: HOSPADM

## 2024-01-27 RX ORDER — CEFUROXIME AXETIL 250 MG/1
500 TABLET ORAL EVERY 12 HOURS SCHEDULED
Status: DISCONTINUED | OUTPATIENT
Start: 2024-01-27 | End: 2024-01-30 | Stop reason: HOSPADM

## 2024-01-27 RX ORDER — NICOTINE POLACRILEX 4 MG
15 LOZENGE BUCCAL
Status: DISCONTINUED | OUTPATIENT
Start: 2024-01-27 | End: 2024-01-30 | Stop reason: HOSPADM

## 2024-01-27 RX ORDER — INSULIN LISPRO 100 [IU]/ML
2-7 INJECTION, SOLUTION INTRAVENOUS; SUBCUTANEOUS
Status: DISCONTINUED | OUTPATIENT
Start: 2024-01-27 | End: 2024-01-30 | Stop reason: HOSPADM

## 2024-01-27 RX ORDER — IBUPROFEN 600 MG/1
1 TABLET ORAL
Status: DISCONTINUED | OUTPATIENT
Start: 2024-01-27 | End: 2024-01-30 | Stop reason: HOSPADM

## 2024-01-27 RX ADMIN — DONEPEZIL HYDROCHLORIDE 10 MG: 10 TABLET, FILM COATED ORAL at 21:48

## 2024-01-27 RX ADMIN — DIGOXIN 250 MCG: 250 TABLET ORAL at 08:36

## 2024-01-27 RX ADMIN — Medication 10 ML: at 08:37

## 2024-01-27 RX ADMIN — HEPARIN SODIUM 5000 UNITS: 5000 INJECTION INTRAVENOUS; SUBCUTANEOUS at 14:07

## 2024-01-27 RX ADMIN — Medication 10 ML: at 21:51

## 2024-01-27 RX ADMIN — CEFUROXIME AXETIL 500 MG: 250 TABLET, FILM COATED ORAL at 12:49

## 2024-01-27 RX ADMIN — Medication 1 TABLET: at 21:48

## 2024-01-27 RX ADMIN — INSULIN LISPRO 4 UNITS: 100 INJECTION, SOLUTION INTRAVENOUS; SUBCUTANEOUS at 12:00

## 2024-01-27 RX ADMIN — PANTOPRAZOLE SODIUM 40 MG: 40 TABLET, DELAYED RELEASE ORAL at 05:37

## 2024-01-27 RX ADMIN — METOPROLOL TARTRATE 12.5 MG: 25 TABLET, FILM COATED ORAL at 08:36

## 2024-01-27 RX ADMIN — FINASTERIDE 5 MG: 5 TABLET, FILM COATED ORAL at 21:49

## 2024-01-27 RX ADMIN — MEMANTINE 10 MG: 10 TABLET ORAL at 21:48

## 2024-01-27 RX ADMIN — HEPARIN SODIUM 5000 UNITS: 5000 INJECTION INTRAVENOUS; SUBCUTANEOUS at 22:03

## 2024-01-27 RX ADMIN — CLOPIDOGREL BISULFATE 75 MG: 75 TABLET ORAL at 08:37

## 2024-01-27 RX ADMIN — INSULIN LISPRO 3 UNITS: 100 INJECTION, SOLUTION INTRAVENOUS; SUBCUTANEOUS at 22:03

## 2024-01-27 RX ADMIN — INSULIN LISPRO 2 UNITS: 100 INJECTION, SOLUTION INTRAVENOUS; SUBCUTANEOUS at 17:09

## 2024-01-27 RX ADMIN — TIOTROPIUM BROMIDE INHALATION SPRAY 2 PUFF: 3.12 SPRAY, METERED RESPIRATORY (INHALATION) at 08:49

## 2024-01-27 RX ADMIN — Medication 1 CAPSULE: at 08:36

## 2024-01-27 RX ADMIN — MIDODRINE HYDROCHLORIDE 5 MG: 5 TABLET ORAL at 08:36

## 2024-01-27 RX ADMIN — MEMANTINE 10 MG: 10 TABLET ORAL at 08:36

## 2024-01-27 RX ADMIN — METOPROLOL TARTRATE 12.5 MG: 25 TABLET, FILM COATED ORAL at 21:48

## 2024-01-27 RX ADMIN — CEFUROXIME AXETIL 500 MG: 250 TABLET, FILM COATED ORAL at 21:48

## 2024-01-27 RX ADMIN — SERTRALINE HYDROCHLORIDE 50 MG: 50 TABLET ORAL at 08:37

## 2024-01-27 RX ADMIN — MIDODRINE HYDROCHLORIDE 5 MG: 5 TABLET ORAL at 17:09

## 2024-01-27 RX ADMIN — PRAVASTATIN SODIUM 40 MG: 40 TABLET ORAL at 21:49

## 2024-01-27 RX ADMIN — MEROPENEM 1000 MG: 1 INJECTION, POWDER, FOR SOLUTION INTRAVENOUS at 04:27

## 2024-01-27 RX ADMIN — TAMSULOSIN HYDROCHLORIDE 0.4 MG: 0.4 CAPSULE ORAL at 08:36

## 2024-01-27 RX ADMIN — SENNOSIDES AND DOCUSATE SODIUM 2 TABLET: 8.6; 5 TABLET ORAL at 08:37

## 2024-01-27 RX ADMIN — ALLOPURINOL 300 MG: 300 TABLET ORAL at 08:36

## 2024-01-27 RX ADMIN — ASPIRIN 81 MG: 81 TABLET, COATED ORAL at 08:36

## 2024-01-27 RX ADMIN — HEPARIN SODIUM 5000 UNITS: 5000 INJECTION INTRAVENOUS; SUBCUTANEOUS at 05:37

## 2024-01-27 NOTE — PROGRESS NOTES
[facility] Medicine Services  PROGRESS NOTE    Patient Name: Darien Camarena  : 1935  MRN: [id number]    Date of Admission: 2024  Primary Care Physician: Pito Way MD    Subjective   Subjective     CC:  Dizziness    HPI:   - Patient is an 88-year-old who presented to the emergency department with complaints of dizziness and hypotension.  He was also recently treated for UTI.  Given history of A-fib cardiology was consulted and medications adjusted.  Today he is feeling better and denies current dizziness.  Blood pressure noted to still be low less than 100 systolic.  Tachycardia also noted.     -patient with positive blood culture gram-positive cocci, possibly contaminant.  Repeat blood cultures requested.  Blood pressure is improved today and patient feels less dizzy.  Infectious disease changed antibiotics to Merrem anticipated through 24.  Urine culture growing E. coli.      Objective   Objective     Vital Signs:   Temp:  [97.6 °F (36.4 °C)-98.3 °F (36.8 °C)] 97.6 °F (36.4 °C)  Heart Rate:  [101-125] 101  Resp:  [18] 18  BP: ()/(62-90) 131/90     Physical Exam:  Constitutional: No acute distress, awake, alert  HENT: NCAT, mucous membranes moist  Respiratory: Clear to auscultation bilaterally, respiratory effort normal   Cardiovascular: Regular rate and rhythm  Gastrointestinal: Positive bowel sounds, soft, nontender, nondistended  Musculoskeletal: No bilateral ankle edema  Psychiatric: Appropriate affect, cooperative  Neurologic: Oriented x 3, strength symmetric in all extremities, Cranial Nerves grossly intact to confrontation, speech clear  Skin: No rashes      Results Reviewed:  LAB RESULTS:      Lab 24  0413 24  2329 24  2032 24  1813 24  1552   WBC 6.94  --   --   --  7.96   HEMOGLOBIN 11.3*  --   --   --  13.0   HEMATOCRIT 34.6*  --   --   --  39.4   PLATELETS 195  --   --   --  243   NEUTROS ABS  --   --    --   --  5.65   IMMATURE GRANS (ABS)  --   --   --   --  0.05   LYMPHS ABS  --   --   --   --  1.46   MONOS ABS  --   --   --   --  0.46   EOS ABS  --   --   --   --  0.27   MCV 96.6  --   --   --  98.0*   SED RATE  --   --   --   --  124*   CRP  --   --   --  1.18*  --    PROCALCITONIN  --   --   --  0.04  --    LACTATE  --  1.9 2.2*  --  2.9*         Lab 01/27/24  0351 01/26/24  0413 01/25/24  1552   SODIUM 139 137 136   POTASSIUM 4.0 3.7 4.6   CHLORIDE 100 99 96*   CO2 31.0* 28.0 27.0   ANION GAP 8.0 10.0 13.0   BUN 38* 49* 52*   CREATININE 0.95 1.14 1.21   EGFR 77.5 62.2 57.9*   GLUCOSE 215* 178* 138*   CALCIUM 8.9 8.9 9.3   MAGNESIUM 1.7 1.7 1.7         Lab 01/25/24  1552   TOTAL PROTEIN 7.3   ALBUMIN 3.4*   GLOBULIN 3.9   ALT (SGPT) 38   AST (SGOT) 41*   BILIRUBIN 0.6   ALK PHOS 240*         Lab 01/25/24  1813 01/25/24  1552   PROBNP  --  1,428.0   HSTROP T 42* 42*                 Brief Urine Lab Results  (Last result in the past 365 days)        Color   Clarity   Blood   Leuk Est   Nitrite   Protein   CREAT   Urine HCG        01/25/24 1913 Yellow   Cloudy   Negative   Moderate (2+)   Positive   Negative                   Microbiology Results Abnormal       Procedure Component Value - Date/Time    Blood Culture - Blood, Arm, Right [[id number]]  (Normal) Collected: 01/25/24 2014    Lab Status: Preliminary result Specimen: Blood from Arm, Right Updated: 01/26/24 2045     Blood Culture No growth at 24 hours            XR Chest 1 View    Result Date: 1/25/2024  XR CHEST 1 VW Date of Exam: 1/25/2024 3:34 PM EST Indication: Chest Pain Protocol Comparison: Chest radiograph and chest CT 1/3/2024 Findings: Cardiomediastinal silhouette is unchanged. Similar left hemidiaphragm with left basilar scarring/subsegmental atelectasis. No focal consolidation or overt pulmonary edema. No pleural effusion or pneumothorax. Osseous structures are unchanged.     Impression: Impression: No evidence of acute cardiopulmonary disease.  Electronically Signed: Surya Lion MD  1/25/2024 3:52 PM EST  Workstation ID: [id number]     Results for orders placed during the hospital encounter of 01/12/24    Adult Transesophageal Echo 3D (FARHAD) W/ Cont If Necessary Per Protocol    Interpretation Summary    Left ventricular systolic function is mildly decreased. Estimated left ventricular EF = 40%    The left ventricular cavity is mildly dilated.    The left atrial cavity is dilated.    There is a 27mm Watchman device which appears well seated and well compressed. Images obtained with difficulty achieving good resolution of the borders but no periprosthetic flow was seen. No device related thrombus seen.    No aortic valve regurgitation or stenosis is present. There is mild thickening of the aortic valve. The aortic valve appears trileaflet.    There is mild, bileaflet mitral valve thickening present. Mild mitral valve regurgitation is present with a centrally-directed jet noted. No significant mitral valve stenosis is present.    The tricuspid valve is normal in structure. Trace tricuspid valve regurgitation is present.    Borderline dilation of the ascending aorta is present. Ascending aorta = 3.6 cm No dilation of the descending aorta present. There is moderate plaque in the descending aorta present.      Current medications:  Scheduled Meds:allopurinol, 300 mg, Oral, Daily  aspirin, 81 mg, Oral, Daily  clopidogrel, 75 mg, Oral, Daily  digoxin, 250 mcg, Oral, Daily  donepezil, 10 mg, Oral, Nightly  finasteride, 5 mg, Oral, Nightly  heparin (porcine), 5,000 Units, Subcutaneous, Q8H  insulin lispro, 2-7 Units, Subcutaneous, 4x Daily AC & at Bedtime  lactobacillus acidophilus, 1 capsule, Oral, Daily  memantine, 10 mg, Oral, BID  meropenem, 1,000 mg, Intravenous, Q8H  metoprolol tartrate, 12.5 mg, Oral, BID  midodrine, 5 mg, Oral, TID AC  multivitamin with minerals, 1 tablet, Oral, Nightly  pantoprazole, 40 mg, Oral, Q AM  pravastatin, 40 mg, Oral,  Nightly  senna-docusate sodium, 2 tablet, Oral, BID  sertraline, 50 mg, Oral, Daily  sodium chloride, 10 mL, Intravenous, Q12H  tamsulosin, 0.4 mg, Oral, Daily  tiotropium bromide monohydrate, 2 puff, Inhalation, Daily - RT      Continuous Infusions:   PRN Meds:.  acetaminophen    albuterol    senna-docusate sodium **AND** polyethylene glycol **AND** bisacodyl **AND** bisacodyl    Calcium Replacement - Follow Nurse / BPA Driven Protocol    dextrose    dextrose    glucagon (human recombinant)    Magnesium Standard Dose Replacement - Follow Nurse / BPA Driven Protocol    Phosphorus Replacement - Follow Nurse / BPA Driven Protocol    Potassium Replacement - Follow Nurse / BPA Driven Protocol    sodium chloride    sodium chloride    sodium chloride    Assessment & Plan   Assessment & Plan     Active Hospital Problems    Diagnosis  POA    **Hypotension [I95.9]  Yes    Low left ventricular ejection fraction [R94.30]  Yes    Sepsis [A41.9]  Yes    Recurrent UTI [N39.0]  Yes    Presence of Watchman left atrial appendage closure device [Z95.818]  Yes    Stage 3 chronic kidney disease [N18.30]  Yes    Persistent atrial fibrillation [I48.19]  Yes    Enlarged prostate without lower urinary tract symptoms (luts) [N40.0]  Yes    Gastroesophageal reflux disease with esophagitis [K21.00]  Yes    Hyperlipidemia LDL goal <100 [E78.5]  Yes    Essential hypertension [I10]  Yes    Type 2 diabetes mellitus with stage 3 chronic kidney disease, without long-term current use of insulin [E11.22, N18.30]  Yes    Obstructive sleep apnea syndrome [G47.33]  Yes      Resolved Hospital Problems   No resolved problems to display.        Brief Hospital Course to date:  Darien Camarena is a 88 y.o. male admitted with symptomatic hypotension (dizziness) and tachycardia.  Recently treated for complicated UTI and noted history of urinary retention requiring self in and out cath.    Symptomatic hypotension  Tachycardia  A-fib with Watchman  device  Chronic systolic congestive heart failure  -Holding diuretics and antihypertensives except metoprolol at lower dose  -Added midodrine  -Cardiology consulted    CKD stage III    COPD    Enlarged prostate  Urinary retention  Recent UTI  -ID consulted for management of antibiotics, currently on Merrem with plan to continue through February 2, 2024  -In and out cath every 8 and as needed  -Continue Flomax    Diabetes mellitus type 2  -Fingerstick blood sugar range 1 38-2 15  -Hemoglobin A1c was 6.20 in October 23  -Sliding scale insulin as needed    Dementia    Depression        Expected Discharge Location and Transportation: Discharge home  Expected Discharge   Expected Discharge Date: 1/29/2024; Expected Discharge Time:      DVT prophylaxis:  Medical DVT prophylaxis orders are present.         AM-PAC 6 Clicks Score (PT): 19 (01/26/24 9986)    CODE STATUS:   Code Status and Medical Interventions:   Ordered at: 01/25/24 7633     Code Status (Patient has no pulse and is not breathing):    CPR (Attempt to Resuscitate)     Medical Interventions (Patient has pulse or is breathing):    Full Support       Valeria Wilkins MD  01/27/24

## 2024-01-27 NOTE — PROGRESS NOTES
[facility]    INFECTIOUS DISEASE PROGRESS NOTE    Darien Camarena  1935  [id number]    Date of consult: 1/26/2024    Admit date: 1/25/2024    Requesting Provider: Anthony Franklin MD  Evaluating physician: Brandon Philippe MD  Reason for Consultation: Possible recurrent UTI with h/o ESBL, E. coli on urine culture from 1/25  Chief Complaint: Symptoms similar to recent hospitalization on 1/5 with possible UTI.      Subjective   History of present illness:  Patient is a  88 y.o. male, known to Dr. Last Og, with h/o Afib/Watchman device, COPD, T2DM, CKD Stage III, STEVEN/CPAP, HTN, HLD, recurrent UTIs, ESBL, and BPH/urinary retention/self catheterization 4 times a day who was recently treated ESBL E.coli UTI with Invanz for 7 days.  He returned to [facility] ED on 1/25 for lightheadedness, fogginess, and warm sensation from sitting to standing for the past days PTA.  He denies dysuria or hematuria. On arrival, the patient is afebrile with , BP 81/62, and remains on room air with some hypoxia.  Initial labs were , lactic acid 2.9, proBNP 1428, WBC 8000 with 71% neutrophils, creatinine 1.21, CRP 1.15, and PCT 0.04.  A UA WBC was 21-50 with moderate LE and positive nitrite and culture positive for GNR. Blood cultures are pending.  A CXR showed no acute findings.  He is currently on Rocephin.  ID was asked on 1/26 to evaluate and manage his antibiotic therapy.  He has minimal complaints in terms of his urinary tract.  He is feeling better.    1/27/2024 history reviewed.  Tolerating antibiotics with meropenem.  No high fever.  Urine culture with E. coli, not obvious ESBL, resistant to levofloxacin and sulfa as well as ampicillin.  Blood culture positive for staphylococcal species, not lugdunensis or aureus.  Patient has allergy to penicillin but has tolerated cephalosporins.    Past Medical History:   Diagnosis Date    Arrhythmia     Atrial fibrillation     Bilateral leg  cramps     possible new medication related side effects    Chronic kidney disease     Clotting disorder     pt doesnt know about this    COPD (chronic obstructive pulmonary disease)     Coronary artery disease     Diabetes mellitus     doesnt check sugar    E. coli sepsis 06/23/2022    Enlarged prostate without lower urinary tract symptoms (luts) 06/20/2016    Full dentures     GERD (gastroesophageal reflux disease)     Gout     Hearing aid worn     bilat prn    History of colonoscopy 09/12/2012    History of radiation therapy 02/24/2023    SBRT LLL lung    [place] (hard of hearing)     hearing aids prn    Hyperlipidemia     Hypertension     Kidney stone     surgery x1    Lung cancer     STEVEN on CPAP     compliant with machine    PAF (paroxysmal atrial fibrillation)     Prostatism     Sleep apnea     CPAP HS    Urinary incontinence     Urinary tract infection     Wears glasses     readers       Past Surgical History:   Procedure Laterality Date    ATRIAL APPENDAGE EXCLUSION LEFT WITH TRANSESOPHAGEAL ECHOCARDIOGRAM N/A 11/28/2023    Procedure: Atrial Appendage Occlusion;  Surgeon: Anthony Mcdaniel MD;  Location:  [facility] INV[facility] LOCATION;  Service: Cardiovascular;  Laterality: N/A;    CARDIAC CATHETERIZATION Left 09/29/2022    Procedure: Left Heart Cath;  Surgeon: Titus Oliveros IV, MD;  Location:  [facility];  Service: Cardiovascular;  Laterality: Left;    CARDIOVERSION      CATARACT EXTRACTION Bilateral     COLONOSCOPY      CYSTOSCOPY      ENDOSCOPY      possible    KIDNEY STONE SURGERY      x1       Pediatric History   Patient Parents    Not on file     Other Topics Concern    Not on file   Social History Narrative    Caffeine: 1 cup of decaff coffee daily        family history includes Alzheimer's disease in his mother; COPD in his father; Cancer in his father; Kidney disease in his father; Lung cancer in his father; No Known Problems in his daughter, daughter, and daughter.    Allergies    Allergen Reactions    Xarelto [Rivaroxaban] GI Bleeding     GI bleed    Penicillins Hives     Has tolerated cefepime, ceftriaxone, cefazolin, cefdinir, cefuroxime, cephalexin       Immunization History   Administered Date(s) Administered    31-influenza Vac Quardvalent Preservativ 11/01/2018, 10/16/2019    COVID-19 (PFIZER) BIVALENT 12+YRS 12/22/2022    COVID-19 (PFIZER) Purple Cap Monovalent 01/26/2021, 02/19/2021    Fluzone High Dose =>65 Years ([facility] ONLY) 10/01/2016, 09/18/2017, 09/15/2018, 10/12/2019, 09/18/2020, 09/25/2021    Fluzone High-Dose 65+yrs 09/19/2020, 09/25/2021, 12/22/2022, 10/30/2023    Hepatitis A 09/15/2018, 11/01/2018    Pneumococcal Conjugate 13-Valent (PCV13) 10/26/2015    Pneumococcal Polysaccharide (PPSV23) 06/24/2006, 09/18/2020    Pneumococcal, Unspecified 11/30/2006    Shingrix 09/25/2021, 04/15/2023    Td (TDVAX) 06/24/2005    Tdap 06/14/2018       Medication:  @Scheduled Meds:allopurinol, 300 mg, Oral, Daily  aspirin, 81 mg, Oral, Daily  clopidogrel, 75 mg, Oral, Daily  digoxin, 250 mcg, Oral, Daily  donepezil, 10 mg, Oral, Nightly  finasteride, 5 mg, Oral, Nightly  heparin (porcine), 5,000 Units, Subcutaneous, Q8H  insulin lispro, 2-7 Units, Subcutaneous, 4x Daily AC & at Bedtime  lactobacillus acidophilus, 1 capsule, Oral, Daily  memantine, 10 mg, Oral, BID  meropenem, 1,000 mg, Intravenous, Q8H  metoprolol tartrate, 12.5 mg, Oral, BID  midodrine, 5 mg, Oral, TID AC  multivitamin with minerals, 1 tablet, Oral, Nightly  pantoprazole, 40 mg, Oral, Q AM  pravastatin, 40 mg, Oral, Nightly  senna-docusate sodium, 2 tablet, Oral, BID  sertraline, 50 mg, Oral, Daily  sodium chloride, 10 mL, Intravenous, Q12H  tamsulosin, 0.4 mg, Oral, Daily  tiotropium bromide monohydrate, 2 puff, Inhalation, Daily - RT      Continuous Infusions:   PRN Meds:.  acetaminophen    albuterol    senna-docusate sodium **AND** polyethylene glycol **AND** bisacodyl **AND** bisacodyl    Calcium Replacement -  "Follow Nurse / BPA Driven Protocol    dextrose    dextrose    glucagon (human recombinant)    Magnesium Standard Dose Replacement - Follow Nurse / BPA Driven Protocol    Phosphorus Replacement - Follow Nurse / BPA Driven Protocol    Potassium Replacement - Follow Nurse / BPA Driven Protocol    sodium chloride    sodium chloride    sodium chloride     Please refer to the medical record for a full medication list    Review of Systems:    Constitutional-- No Fever, chills or sweats.  Appetite decreased, and some malaise. No fatigue.  HEENT-- No new vision, hearing or throat complaints.  No epistaxis or oral sores.  Denies odynophagia or dysphagia. No headache, photophobia or neck stiffness.  CV-- No chest pain, palpitation or syncope  Resp-- No SOB/cough/Hemoptysis  GI- No nausea, vomiting, or diarrhea.  No hematochezia, melena, or hematemesis. Denies jaundice or chronic liver disease.  -- No dysuria, hematuria, or flank pain.  Denies hesitancy, urgency or burning with urination.  Self caths.  Has some back pain prior to arrival, but better now.   Lymph- no swollen lymph nodes in neck/axilla or groin.   Heme- No active bruising or bleeding; no Hx of DVT or PE.  MS-- no swelling or pain in the bones or joints of arms/legs.  No new back pain.  Neuro-- No acute focal weakness or numbness in the arms or legs.  No seizures.  Some dizziness as per HPI.  Skin--No rashes or lesions, no nodules    Physical Exam:   Vital Signs   Temp:  [97.6 °F (36.4 °C)-98.3 °F (36.8 °C)] 97.6 °F (36.4 °C)  Heart Rate:  [] 96  Resp:  [17-18] 17  BP: (110-131)/(71-90) 131/90    Blood pressure 131/90, pulse 96, temperature 97.6 °F (36.4 °C), temperature source Oral, resp. rate 17, height 190.5 cm (75\"), weight 112 kg (248 lb), SpO2 93%.  GENERAL: Awake and alert, in minimal distress. Appears older than stated age.  Resting in chair.  HEENT:  Normocephalic, atraumatic.  Oropharynx without thrush. Dentition in fair repair. No cervical " adenopathy. No neck masses.  Ears externally normal, Nose externally normal.  EYES: No conjunctival injection. No icterus. EOM full.  LYMPHATICS: No lymphadenopathy of the neck or axillary or inguinal regions.   HEART: No murmur, gallop, or pericardial friction rub. Reg rate rhythm, No JVD at 45 degrees.  LUNGS: Clear to auscultation and percussion. No respiratory distress, no use of accessory muscles.  ABDOMEN: Soft, nontender, nondistended. No appreciable HSM.  Bowel sounds normal.  SKIN: Warm and dry without cutaneous eruptions.  No nodules.    PSYCHIATRIC: Mental status lucid. No confusion.  EXT:  No cellulitic change.  NEURO: Oriented to name, nonfocal.      Results Review:   I reviewed the patient's new clinical results.  I reviewed the patient's new imaging results and agree with the interpretation.  I reviewed the patient's other test results and agree with the interpretation    Results from last 7 days   Lab Units 01/26/24  0413 01/25/24  1552   WBC 10*3/mm3 6.94 7.96   HEMOGLOBIN g/dL 11.3* 13.0   HEMATOCRIT % 34.6* 39.4   PLATELETS 10*3/mm3 195 243     Results from last 7 days   Lab Units 01/27/24  0351   SODIUM mmol/L 139   POTASSIUM mmol/L 4.0   CHLORIDE mmol/L 100   CO2 mmol/L 31.0*   BUN mg/dL 38*   CREATININE mg/dL 0.95   GLUCOSE mg/dL 215*   CALCIUM mg/dL 8.9     Results from last 7 days   Lab Units 01/25/24  1552   ALK PHOS U/L 240*   BILIRUBIN mg/dL 0.6   ALT (SGPT) U/L 38   AST (SGOT) U/L 41*     Results from last 7 days   Lab Units 01/25/24  1552   SED RATE mm/hr 124*     Results from last 7 days   Lab Units 01/25/24  1813   CRP mg/dL 1.18*         Results from last 7 days   Lab Units 01/27/24  0943   LACTATE mmol/L 1.2     Estimated Creatinine Clearance: 74 mL/min (by C-G formula based on SCr of 0.95 mg/dL).     Procalitonin Results:      Lab 01/25/24  1813   PROCALCITONIN 0.04      Brief Urine Lab Results  (Last result in the past 365 days)        Color   Clarity   Blood   Leuk Est   Nitrite    "Protein   CREAT   Urine HCG        01/25/24 1913 Yellow   Cloudy   Negative   Moderate (2+)   Positive   Negative                  No results found for: \"SITE\", \"ALLENTEST\", \"PHART\", \"MQM1CVL\", \"PO2ART\", \"ERN2NFA\", \"BASEEXCESS\", \"Q6YKSODI\", \"HGBBG\", \"HCTABG\", \"OXYHEMOGLOBI\", \"METHHGBN\", \"CARBOXYHGB\", \"CO2CT\", \"BAROMETRIC\", \"MODALITY\", \"FIO2\"     Microbiology:  Microbiology Results (last 10 days)       Procedure Component Value - Date/Time    Blood Culture - Blood, Arm, Left [[id number]]  (Abnormal) Collected: 01/25/24 2014    Lab Status: Preliminary result Specimen: Blood from Arm, Left Updated: 01/27/24 0803     Blood Culture Abnormal Stain     Gram Stain Aerobic Bottle Gram positive cocci in groups    Blood Culture - Blood, Arm, Right [[id number]]  (Normal) Collected: 01/25/24 2014    Lab Status: Preliminary result Specimen: Blood from Arm, Right Updated: 01/26/24 2045     Blood Culture No growth at 24 hours    Blood Culture ID, PCR - Blood, Arm, Left [[id number]]  (Abnormal) Collected: 01/25/24 2014    Lab Status: Final result Specimen: Blood from Arm, Left Updated: 01/27/24 0829     BCID, PCR Staph spp, not aureus or lugdunensis. Identification by BCID2 PCR.     BOTTLE TYPE Aerobic Bottle    Urine Culture - Urine, Urine, Clean Catch [[id number]]  (Abnormal)  (Susceptibility) Collected: 01/25/24 1913    Lab Status: Final result Specimen: Urine, Clean Catch Updated: 01/27/24 0612     Urine Culture >100,000 CFU/mL Escherichia coli    Narrative:      Colonization of the urinary tract without infection is common. Treatment is discouraged unless the patient is symptomatic, pregnant, or undergoing an invasive urologic procedure.    Susceptibility        Escherichia coli      JESSICA      Amikacin Susceptible      Amoxicillin + Clavulanate Susceptible      Ampicillin Resistant      Ampicillin + Sulbactam Intermediate      Cefazolin Susceptible      Cefepime Susceptible      Ceftazidime Susceptible      Ceftriaxone Susceptible  "     Gentamicin Resistant      Levofloxacin Resistant      Nitrofurantoin Susceptible      Piperacillin + Tazobactam Susceptible      Tobramycin Resistant      Trimethoprim + Sulfamethoxazole Resistant                                       Radiology:  Imaging Results (Last 72 Hours)       Procedure Component Value Units Date/Time    XR Chest 1 View [[id number]] Collected: 01/25/24 1550     Updated: 01/25/24 1555    Narrative:      XR CHEST 1 VW    Date of Exam: 1/25/2024 3:34 PM EST    Indication: Chest Pain Protocol    Comparison: Chest radiograph and chest CT 1/3/2024    Findings:  Cardiomediastinal silhouette is unchanged. Similar left hemidiaphragm with left basilar scarring/subsegmental atelectasis. No focal consolidation or overt pulmonary edema. No pleural effusion or pneumothorax. Osseous structures are unchanged.      Impression:      Impression:  No evidence of acute cardiopulmonary disease.       Electronically Signed: Surya Lion MD    1/25/2024 3:52 PM EST    Workstation ID: [id number]            IMPRESSION:   Recurrent GNR acute bacterial cystitis with h/o ESBL, not surprising giving the recurrent self cath for urinary retention.  E. coli, not ESBL from 1/25.  Staphylococcal species bacteremia, new.  Contaminant.  Benign prostatic hypertrophy with urinary retention/self catheterizations 4 times a day.  Increases risk for recurrent UTI.  Paroxysmal atrial fibrillation/Watchman procedure/Plavix  Type 2 diabetes mellitus  Chronic kidney disease Stage IIIa  Obesity  Essential hypertension  Obstructive sleep apnea/CPAP  Penicillin allergy (hives). Tolerated Rocephin in past.   Anemia, chronic disease.  Slightly worse.      PLAN:  Diagnostically, continue to follow blood and urine cultures, physical examination, imaging, CBC, CMP, ESR, CRP, procalcitonin, lactic acid.  Therapeutically, change to cefuroxime 500 mg p.o. twice daily until 2/2/24 to facilitate outpatient therapy.  Supportive care.  At risk for  deconditioning.    I discussed the patient's findings and my recommendations with patient, nursing staff, and primary care team    Thank you for asking me to see Darien Camarena.  Our group would be pleased to follow this patient over the course of their hospitalization and assist with outpatient antimicrobial therapy, as indicated.  Further recommendations depend on the results of the cultures and clinical course.  Increased risk for adverse drug reactions, complications of IV access, readmission.    See next on Monday, call sooner if needed.  Follow-up should be scheduled with Dr. Og within the week.    Brandon Philippe MD  1/27/2024

## 2024-01-27 NOTE — PROGRESS NOTES
"[facility]  IP Progress Note    PROBLEM LIST:  History of atrial fibrillation/watchman  Chronic kidney disease  COPD  Coronary artery disease  UTI      HOSPITAL COURSE:  Patient is being admitted for recurrent UTIs      CHIEF COMPLAINTS:  Chest pain shortness of breath      Subjective   Feeling much better      Objective     Blood pressure 131/90, pulse 96, temperature 97.6 °F (36.4 °C), temperature source Oral, resp. rate 17, height 190.5 cm (75\"), weight 112 kg (248 lb), SpO2 93%.     Intake/Output Summary (Last 24 hours) at 1/27/2024 1156  Last data filed at 1/27/2024 0500  Gross per 24 hour   Intake --   Output 2650 ml   Net -2650 ml       PHYSICAL EXAM:  Constitutional:       General: Awake and alert     Appearance: In no acute distress    Neck:     JVP: Not elevated     Carotid artery: No carotid bruit    Pulmonary:      Effort: Pulmonary effort is normal.      Breath sounds: Decreased breath sounds    Cardiovascular:      Normal rate. Regular rhythm. Normal S1. Normal S2.      Murmurs: There is no significant murmur.      No gallop. No click. No rub.     Abdominal:      General: Bowel sounds are normal.      Palpations: Abdomen is soft.      Tenderness: There is no abdominal tenderness.    Extremities:     Pulses: Good distal pulses     Edema: Trace edema        MEDICATIONS:    allopurinol, 300 mg, Oral, Daily  aspirin, 81 mg, Oral, Daily  cefuroxime, 500 mg, Oral, Q12H  clopidogrel, 75 mg, Oral, Daily  digoxin, 250 mcg, Oral, Daily  donepezil, 10 mg, Oral, Nightly  finasteride, 5 mg, Oral, Nightly  heparin (porcine), 5,000 Units, Subcutaneous, Q8H  insulin lispro, 2-7 Units, Subcutaneous, 4x Daily AC & at Bedtime  lactobacillus acidophilus, 1 capsule, Oral, Daily  memantine, 10 mg, Oral, BID  metoprolol tartrate, 12.5 mg, Oral, BID  midodrine, 5 mg, Oral, TID AC  multivitamin with minerals, 1 tablet, Oral, Nightly  pantoprazole, 40 mg, Oral, Q AM  pravastatin, 40 mg, " "Oral, Nightly  senna-docusate sodium, 2 tablet, Oral, BID  sertraline, 50 mg, Oral, Daily  sodium chloride, 10 mL, Intravenous, Q12H  tamsulosin, 0.4 mg, Oral, Daily  tiotropium bromide monohydrate, 2 puff, Inhalation, Daily - RT          Results from last 7 days   Lab Units 01/26/24  0413   WBC 10*3/mm3 6.94   HEMOGLOBIN g/dL 11.3*   HEMATOCRIT % 34.6*   PLATELETS 10*3/mm3 195     Results from last 7 days   Lab Units 01/27/24  0351 01/26/24  0413 01/25/24  1552   SODIUM mmol/L 139   < > 136   POTASSIUM mmol/L 4.0   < > 4.6   CHLORIDE mmol/L 100   < > 96*   CO2 mmol/L 31.0*   < > 27.0   BUN mg/dL 38*   < > 52*   CREATININE mg/dL 0.95   < > 1.21   CALCIUM mg/dL 8.9   < > 9.3   BILIRUBIN mg/dL  --   --  0.6   ALK PHOS U/L  --   --  240*   ALT (SGPT) U/L  --   --  38   AST (SGOT) U/L  --   --  41*   GLUCOSE mg/dL 215*   < > 138*    < > = values in this interval not displayed.         Lab Results   Component Value Date    TROPONINT 42 (H) 01/25/2024     Results from last 7 days   Lab Units 01/26/24  1159   T3 FREE pg/mL 2.62             No results found for: \"IRON\", \"FERRITIN\", \"LABIRON\", \"TIBC\"   Hemoglobin A1C   Date Value Ref Range Status   10/30/2023 6.20 (H) 4.80 - 5.60 % Final     Magnesium   Date Value Ref Range Status   01/27/2024 1.7 1.6 - 2.4 mg/dL Final        RESULT REVIEW:    I reviewed the patient's new clinical results.    Tele: Atrial Fib with Controlled Rate      ASSESSMENT:     Atrial fibrillation  Hypertension  Urosepsis    PLAN:     Patient is being worked up for possible treatment for urosepsis  Patient is responding good to the treatment.  Will continue monitoring him in the hospital.  "

## 2024-01-28 LAB
ANION GAP SERPL CALCULATED.3IONS-SCNC: 9 MMOL/L (ref 5–15)
BACTERIA SPEC AEROBE CULT: ABNORMAL
BASOPHILS # BLD AUTO: 0.05 10*3/MM3 (ref 0–0.2)
BASOPHILS NFR BLD AUTO: 0.7 % (ref 0–1.5)
BUN SERPL-MCNC: 37 MG/DL (ref 8–23)
BUN/CREAT SERPL: 37.4 (ref 7–25)
CALCIUM SPEC-SCNC: 9.2 MG/DL (ref 8.6–10.5)
CHLORIDE SERPL-SCNC: 102 MMOL/L (ref 98–107)
CO2 SERPL-SCNC: 28 MMOL/L (ref 22–29)
CREAT SERPL-MCNC: 0.99 MG/DL (ref 0.76–1.27)
DEPRECATED RDW RBC AUTO: 49.1 FL (ref 37–54)
EGFRCR SERPLBLD CKD-EPI 2021: 73.7 ML/MIN/1.73
EOSINOPHIL # BLD AUTO: 0.32 10*3/MM3 (ref 0–0.4)
EOSINOPHIL NFR BLD AUTO: 4.3 % (ref 0.3–6.2)
ERYTHROCYTE [DISTWIDTH] IN BLOOD BY AUTOMATED COUNT: 14 % (ref 12.3–15.4)
GLUCOSE BLDC GLUCOMTR-MCNC: 146 MG/DL (ref 70–130)
GLUCOSE BLDC GLUCOMTR-MCNC: 173 MG/DL (ref 70–130)
GLUCOSE BLDC GLUCOMTR-MCNC: 192 MG/DL (ref 70–130)
GLUCOSE SERPL-MCNC: 155 MG/DL (ref 65–99)
GRAM STN SPEC: ABNORMAL
HCT VFR BLD AUTO: 33.2 % (ref 37.5–51)
HGB BLD-MCNC: 11 G/DL (ref 13–17.7)
IMM GRANULOCYTES # BLD AUTO: 0.11 10*3/MM3 (ref 0–0.05)
IMM GRANULOCYTES NFR BLD AUTO: 1.5 % (ref 0–0.5)
ISOLATED FROM: ABNORMAL
LYMPHOCYTES # BLD AUTO: 1.43 10*3/MM3 (ref 0.7–3.1)
LYMPHOCYTES NFR BLD AUTO: 19 % (ref 19.6–45.3)
MAGNESIUM SERPL-MCNC: 1.7 MG/DL (ref 1.6–2.4)
MCH RBC QN AUTO: 32.4 PG (ref 26.6–33)
MCHC RBC AUTO-ENTMCNC: 33.1 G/DL (ref 31.5–35.7)
MCV RBC AUTO: 97.9 FL (ref 79–97)
MONOCYTES # BLD AUTO: 0.57 10*3/MM3 (ref 0.1–0.9)
MONOCYTES NFR BLD AUTO: 7.6 % (ref 5–12)
NEUTROPHILS NFR BLD AUTO: 5.04 10*3/MM3 (ref 1.7–7)
NEUTROPHILS NFR BLD AUTO: 66.9 % (ref 42.7–76)
NRBC BLD AUTO-RTO: 0 /100 WBC (ref 0–0.2)
PLATELET # BLD AUTO: 198 10*3/MM3 (ref 140–450)
PMV BLD AUTO: 10.2 FL (ref 6–12)
POTASSIUM SERPL-SCNC: 4 MMOL/L (ref 3.5–5.2)
RBC # BLD AUTO: 3.39 10*6/MM3 (ref 4.14–5.8)
SODIUM SERPL-SCNC: 139 MMOL/L (ref 136–145)
WBC NRBC COR # BLD AUTO: 7.52 10*3/MM3 (ref 3.4–10.8)

## 2024-01-28 PROCEDURE — 25010000002 MAGNESIUM SULFATE 4 GM/100ML SOLUTION: Performed by: PHYSICIAN ASSISTANT

## 2024-01-28 PROCEDURE — 63710000001 INSULIN LISPRO (HUMAN) PER 5 UNITS: Performed by: FAMILY MEDICINE

## 2024-01-28 PROCEDURE — 25010000002 HEPARIN (PORCINE) PER 1000 UNITS: Performed by: STUDENT IN AN ORGANIZED HEALTH CARE EDUCATION/TRAINING PROGRAM

## 2024-01-28 PROCEDURE — 94799 UNLISTED PULMONARY SVC/PX: CPT

## 2024-01-28 PROCEDURE — 82948 REAGENT STRIP/BLOOD GLUCOSE: CPT

## 2024-01-28 PROCEDURE — 80048 BASIC METABOLIC PNL TOTAL CA: CPT | Performed by: STUDENT IN AN ORGANIZED HEALTH CARE EDUCATION/TRAINING PROGRAM

## 2024-01-28 PROCEDURE — 85025 COMPLETE CBC W/AUTO DIFF WBC: CPT | Performed by: FAMILY MEDICINE

## 2024-01-28 PROCEDURE — 83735 ASSAY OF MAGNESIUM: CPT | Performed by: STUDENT IN AN ORGANIZED HEALTH CARE EDUCATION/TRAINING PROGRAM

## 2024-01-28 PROCEDURE — 99231 SBSQ HOSP IP/OBS SF/LOW 25: CPT | Performed by: PHYSICIAN ASSISTANT

## 2024-01-28 PROCEDURE — 94664 DEMO&/EVAL PT USE INHALER: CPT

## 2024-01-28 PROCEDURE — 99232 SBSQ HOSP IP/OBS MODERATE 35: CPT | Performed by: FAMILY MEDICINE

## 2024-01-28 RX ORDER — MAGNESIUM SULFATE HEPTAHYDRATE 40 MG/ML
4 INJECTION, SOLUTION INTRAVENOUS ONCE
Status: COMPLETED | OUTPATIENT
Start: 2024-01-28 | End: 2024-01-28

## 2024-01-28 RX ADMIN — INSULIN LISPRO 2 UNITS: 100 INJECTION, SOLUTION INTRAVENOUS; SUBCUTANEOUS at 08:08

## 2024-01-28 RX ADMIN — HEPARIN SODIUM 5000 UNITS: 5000 INJECTION INTRAVENOUS; SUBCUTANEOUS at 21:29

## 2024-01-28 RX ADMIN — Medication 10 ML: at 08:09

## 2024-01-28 RX ADMIN — Medication 10 ML: at 21:29

## 2024-01-28 RX ADMIN — MIDODRINE HYDROCHLORIDE 5 MG: 5 TABLET ORAL at 11:22

## 2024-01-28 RX ADMIN — DONEPEZIL HYDROCHLORIDE 10 MG: 10 TABLET, FILM COATED ORAL at 21:29

## 2024-01-28 RX ADMIN — SENNOSIDES AND DOCUSATE SODIUM 2 TABLET: 8.6; 5 TABLET ORAL at 21:27

## 2024-01-28 RX ADMIN — PANTOPRAZOLE SODIUM 40 MG: 40 TABLET, DELAYED RELEASE ORAL at 06:05

## 2024-01-28 RX ADMIN — FINASTERIDE 5 MG: 5 TABLET, FILM COATED ORAL at 21:29

## 2024-01-28 RX ADMIN — DIGOXIN 250 MCG: 250 TABLET ORAL at 08:07

## 2024-01-28 RX ADMIN — CLOPIDOGREL BISULFATE 75 MG: 75 TABLET ORAL at 08:07

## 2024-01-28 RX ADMIN — SERTRALINE HYDROCHLORIDE 50 MG: 50 TABLET ORAL at 08:08

## 2024-01-28 RX ADMIN — ASPIRIN 81 MG: 81 TABLET, COATED ORAL at 08:08

## 2024-01-28 RX ADMIN — ALLOPURINOL 300 MG: 300 TABLET ORAL at 08:07

## 2024-01-28 RX ADMIN — TIOTROPIUM BROMIDE INHALATION SPRAY 2 PUFF: 3.12 SPRAY, METERED RESPIRATORY (INHALATION) at 08:42

## 2024-01-28 RX ADMIN — Medication 1 TABLET: at 21:27

## 2024-01-28 RX ADMIN — PRAVASTATIN SODIUM 40 MG: 40 TABLET ORAL at 21:28

## 2024-01-28 RX ADMIN — METOPROLOL TARTRATE 12.5 MG: 25 TABLET, FILM COATED ORAL at 08:07

## 2024-01-28 RX ADMIN — Medication 1 CAPSULE: at 08:09

## 2024-01-28 RX ADMIN — TAMSULOSIN HYDROCHLORIDE 0.4 MG: 0.4 CAPSULE ORAL at 08:07

## 2024-01-28 RX ADMIN — MIDODRINE HYDROCHLORIDE 5 MG: 5 TABLET ORAL at 17:05

## 2024-01-28 RX ADMIN — INSULIN LISPRO 2 UNITS: 100 INJECTION, SOLUTION INTRAVENOUS; SUBCUTANEOUS at 11:22

## 2024-01-28 RX ADMIN — METOPROLOL TARTRATE 12.5 MG: 25 TABLET, FILM COATED ORAL at 21:28

## 2024-01-28 RX ADMIN — HEPARIN SODIUM 5000 UNITS: 5000 INJECTION INTRAVENOUS; SUBCUTANEOUS at 14:28

## 2024-01-28 RX ADMIN — MAGNESIUM SULFATE HEPTAHYDRATE 4 G: 4 INJECTION, SOLUTION INTRAVENOUS at 08:08

## 2024-01-28 RX ADMIN — CEFUROXIME AXETIL 500 MG: 250 TABLET, FILM COATED ORAL at 21:27

## 2024-01-28 RX ADMIN — HEPARIN SODIUM 5000 UNITS: 5000 INJECTION INTRAVENOUS; SUBCUTANEOUS at 06:06

## 2024-01-28 RX ADMIN — CEFUROXIME AXETIL 500 MG: 250 TABLET, FILM COATED ORAL at 08:07

## 2024-01-28 RX ADMIN — MEMANTINE 10 MG: 10 TABLET ORAL at 21:27

## 2024-01-28 RX ADMIN — MEMANTINE 10 MG: 10 TABLET ORAL at 08:08

## 2024-01-28 NOTE — PROGRESS NOTES
"   [facility] at [facility]  INPATIENT PROGRESS NOTE    Date of Admission: 1/25/2024  Date of Service: 01/28/24    Identification: Darien Camarena is a 88 y.o. male   Primary Care Physician: Pito Way MD    Chief Complaint: A-fib/hypotension  Problem List:   Hypotension    Type 2 diabetes mellitus with stage 3 chronic kidney disease, without long-term current use of insulin    Enlarged prostate without lower urinary tract symptoms (luts)    Gastroesophageal reflux disease with esophagitis    Hyperlipidemia LDL goal <100    Essential hypertension    Obstructive sleep apnea syndrome    Persistent atrial fibrillation    Stage 3 chronic kidney disease    Presence of Watchman left atrial appendage closure device    Recurrent UTI    Sepsis    Low left ventricular ejection fraction          Subjective/Interval History    Patient lying in bed.  He is in A-fib with RVR this morning but asymptomatic with his heart rate going into the 130s briefly.            Objective   Vitals:  /87 (BP Location: Right arm, Patient Position: Lying)   Pulse 92   Temp 98.2 °F (36.8 °C) (Oral)   Resp 18   Ht 190.5 cm (75\")   Wt 112 kg (246 lb 14.6 oz)   SpO2 91%   BMI 30.86 kg/m²     Intake/Output Summary (Last 24 hours) at 1/28/2024 0746  Last data filed at 1/27/2024 2200  Gross per 24 hour   Intake --   Output 1600 ml   Net -1600 ml       Current Medications:  allopurinol, 300 mg, Oral, Daily  aspirin, 81 mg, Oral, Daily  cefuroxime, 500 mg, Oral, Q12H  clopidogrel, 75 mg, Oral, Daily  digoxin, 250 mcg, Oral, Daily  donepezil, 10 mg, Oral, Nightly  finasteride, 5 mg, Oral, Nightly  heparin (porcine), 5,000 Units, Subcutaneous, Q8H  insulin lispro, 2-7 Units, Subcutaneous, 4x Daily AC & at Bedtime  lactobacillus acidophilus, 1 capsule, Oral, Daily  memantine, 10 mg, Oral, BID  metoprolol tartrate, 12.5 mg, Oral, BID  midodrine, 5 mg, Oral, TID AC  multivitamin with minerals, 1 tablet, Oral, " Nightly  pantoprazole, 40 mg, Oral, Q AM  pravastatin, 40 mg, Oral, Nightly  senna-docusate sodium, 2 tablet, Oral, BID  sertraline, 50 mg, Oral, Daily  sodium chloride, 10 mL, Intravenous, Q12H  tamsulosin, 0.4 mg, Oral, Daily  tiotropium bromide monohydrate, 2 puff, Inhalation, Daily - RT         Constitutional:       Appearance: Not in distress.   Neck:      Vascular: JVD normal.   Pulmonary:      Effort: Pulmonary effort is normal.   Cardiovascular:      Tachycardia present. Irregularly irregular rhythm.   Edema:     Peripheral edema absent.   Neurological:      General: No focal deficit present.          Data Review:  Results from last 7 days   Lab Units 01/28/24  0421 01/26/24  0413 01/25/24  1552   WBC 10*3/mm3 7.52 6.94 7.96   HEMOGLOBIN g/dL 11.0* 11.3* 13.0   HEMATOCRIT % 33.2* 34.6* 39.4   PLATELETS 10*3/mm3 198 195 243     Results from last 7 days   Lab Units 01/28/24  0421 01/27/24  0351 01/26/24  0413   SODIUM mmol/L 139 139 137   POTASSIUM mmol/L 4.0 4.0 3.7   CHLORIDE mmol/L 102 100 99   CO2 mmol/L 28.0 31.0* 28.0   BUN mg/dL 37* 38* 49*   CREATININE mg/dL 0.99 0.95 1.14   GLUCOSE mg/dL 155* 215* 178*              Results from last 7 days   Lab Units 01/26/24  1159   T3 FREE pg/mL 2.62             Results from last 7 days   Lab Units 01/25/24  1813 01/25/24  1552   HSTROP T ng/L 42* 42*     Results from last 7 days   Lab Units 01/25/24  1552   PROBNP pg/mL 1,428.0       No radiology results for the last day    Results for orders placed during the hospital encounter of 01/25/24    Adult Transthoracic Echo Limited W/ Cont if Necessary Per Protocol    Interpretation Summary    Left ventricular systolic function is low normal. Calculated left ventricular EF = 47.8% Left ventricular ejection fraction appears to be 46 - 50%.      RESULTS REVIEW:    I reviewed the patient's new clinical results.    I personally reviewed the patient's EKG/Telemetry data              Assessment:   Persistent atrial  fibrillation  S/p Watchman implantation 11/29/2023 due to GI bleed and hematuria  Started on digoxin 250 mcg  No need for DOAC secondary to watchman implantation.  Hypotension  On midodrine 5 mg 3 times daily  Newly diagnosed HFrEF, possibly tachycardic induced, remains euvolemic  Repeat limited echo shows mildly reduced EF of 46-50% on 1/25/2024.  Hyperlipidemia  UTI/urinary retention, ID following              Plan:   Continue digoxin 250 mcg daily and check digoxin level in the morning.  Continue metoprolol to tartrate 12.5 mg twice daily as blood pressure allows for rate control.  Continue aspirin 81 mg and Plavix after Watchman device implantation.  Continue to hold diuretics and valsartan given patient's hypotension.  UTI being managed by ID, now on oral antibiotics.  I think it is reasonable to monitor patient today with his hypotension and tachycardia and reevaluate tomorrow by Dr. Oliveros/Ami Ansari.           Electronically signed by Naomy Domingo PA-C, 01/28/24, 10:07 AM EST.

## 2024-01-28 NOTE — PROGRESS NOTES
[facility]    INFECTIOUS DISEASE PROGRESS NOTE    Darien Camarena  1935  [id number]    Date of consult: 1/26/2024    Admit date: 1/25/2024    Requesting Provider: Anthony Franklin MD  Evaluating physician: Brandon Philippe MD  Reason for Consultation: Possible recurrent UTI with h/o ESBL, E. coli on urine culture from 1/25  Chief Complaint: Symptoms similar to recent hospitalization on 1/5 with possible UTI.      Subjective   History of present illness:  Patient is a  88 y.o. male, known to Dr. Last Og, with h/o Afib/Watchman device, COPD, T2DM, CKD Stage III, STEVEN/CPAP, HTN, HLD, recurrent UTIs, ESBL, and BPH/urinary retention/self catheterization 4 times a day who was recently treated ESBL E.coli UTI with Invanz for 7 days.  He returned to [facility] ED on 1/25 for lightheadedness, fogginess, and warm sensation from sitting to standing for the past days PTA.  He denies dysuria or hematuria. On arrival, the patient is afebrile with , BP 81/62, and remains on room air with some hypoxia.  Initial labs were , lactic acid 2.9, proBNP 1428, WBC 8000 with 71% neutrophils, creatinine 1.21, CRP 1.15, and PCT 0.04.  A UA WBC was 21-50 with moderate LE and positive nitrite and culture positive for GNR. Blood cultures are pending.  A CXR showed no acute findings.  He is currently on Rocephin.  ID was asked on 1/26 to evaluate and manage his antibiotic therapy.  He has minimal complaints in terms of his urinary tract.  He is feeling better.    1/27/2024 history reviewed.  Tolerating antibiotics with meropenem.  No high fever.  Urine culture with E. coli, not obvious ESBL, resistant to levofloxacin and sulfa as well as ampicillin.  Blood culture positive for staphylococcal species, not lugdunensis or aureus.  Patient has allergy to penicillin but has tolerated cephalosporins.    1/28/2024 history reviewed.  Tolerating cefuroxime for E. coli cystitis.  No high fevers.    Past  Medical History:   Diagnosis Date    Arrhythmia     Atrial fibrillation     Bilateral leg cramps     possible new medication related side effects    Chronic kidney disease     Clotting disorder     pt doesnt know about this    COPD (chronic obstructive pulmonary disease)     Coronary artery disease     Diabetes mellitus     doesnt check sugar    E. coli sepsis 06/23/2022    Enlarged prostate without lower urinary tract symptoms (luts) 06/20/2016    Full dentures     GERD (gastroesophageal reflux disease)     Gout     Hearing aid worn     bilat prn    History of colonoscopy 09/12/2012    History of radiation therapy 02/24/2023    SBRT LLL lung    [place] (hard of hearing)     hearing aids prn    Hyperlipidemia     Hypertension     Kidney stone     surgery x1    Lung cancer     STEVEN on CPAP     compliant with machine    PAF (paroxysmal atrial fibrillation)     Prostatism     Sleep apnea     CPAP HS    Urinary incontinence     Urinary tract infection     Wears glasses     readers       Past Surgical History:   Procedure Laterality Date    ATRIAL APPENDAGE EXCLUSION LEFT WITH TRANSESOPHAGEAL ECHOCARDIOGRAM N/A 11/28/2023    Procedure: Atrial Appendage Occlusion;  Surgeon: Anthony Mcdaniel MD;  Location:  [facility];  Service: Cardiovascular;  Laterality: N/A;    CARDIAC CATHETERIZATION Left 09/29/2022    Procedure: Left Heart Cath;  Surgeon: Titus Oliveros IV, MD;  Location:  [facility];  Service: Cardiovascular;  Laterality: Left;    CARDIOVERSION      CATARACT EXTRACTION Bilateral     COLONOSCOPY      CYSTOSCOPY      ENDOSCOPY      possible    KIDNEY STONE SURGERY      x1       Pediatric History   Patient Parents    Not on file     Other Topics Concern    Not on file   Social History Narrative    Caffeine: 1 cup of decaff coffee daily        family history includes Alzheimer's disease in his mother; COPD in his father; Cancer in his father; Kidney disease in his father; Lung cancer  in his father; No Known Problems in his daughter, daughter, and daughter.    Allergies   Allergen Reactions    Xarelto [Rivaroxaban] GI Bleeding     GI bleed    Penicillins Hives     Has tolerated cefepime, ceftriaxone, cefazolin, cefdinir, cefuroxime, cephalexin       Immunization History   Administered Date(s) Administered    31-influenza Vac Quardvalent Preservativ 11/01/2018, 10/16/2019    COVID-19 (PFIZER) BIVALENT 12+YRS 12/22/2022    COVID-19 (PFIZER) Purple Cap Monovalent 01/26/2021, 02/19/2021    Fluzone High Dose =>65 Years ([employer] ONLY) 10/01/2016, 09/18/2017, 09/15/2018, 10/12/2019, 09/18/2020, 09/25/2021    Fluzone High-Dose 65+yrs 09/19/2020, 09/25/2021, 12/22/2022, 10/30/2023    Hepatitis A 09/15/2018, 11/01/2018    Pneumococcal Conjugate 13-Valent (PCV13) 10/26/2015    Pneumococcal Polysaccharide (PPSV23) 06/24/2006, 09/18/2020    Pneumococcal, Unspecified 11/30/2006    Shingrix 09/25/2021, 04/15/2023    Td (TDVAX) 06/24/2005    Tdap 06/14/2018       Medication:  @Scheduled Meds:allopurinol, 300 mg, Oral, Daily  aspirin, 81 mg, Oral, Daily  cefuroxime, 500 mg, Oral, Q12H  clopidogrel, 75 mg, Oral, Daily  digoxin, 250 mcg, Oral, Daily  donepezil, 10 mg, Oral, Nightly  finasteride, 5 mg, Oral, Nightly  heparin (porcine), 5,000 Units, Subcutaneous, Q8H  insulin lispro, 2-7 Units, Subcutaneous, 4x Daily AC & at Bedtime  lactobacillus acidophilus, 1 capsule, Oral, Daily  magnesium sulfate, 4 g, Intravenous, Once  memantine, 10 mg, Oral, BID  metoprolol tartrate, 12.5 mg, Oral, BID  midodrine, 5 mg, Oral, TID AC  multivitamin with minerals, 1 tablet, Oral, Nightly  pantoprazole, 40 mg, Oral, Q AM  pravastatin, 40 mg, Oral, Nightly  senna-docusate sodium, 2 tablet, Oral, BID  sertraline, 50 mg, Oral, Daily  sodium chloride, 10 mL, Intravenous, Q12H  tamsulosin, 0.4 mg, Oral, Daily  tiotropium bromide monohydrate, 2 puff, Inhalation, Daily - RT      Continuous Infusions:   PRN Meds:.  acetaminophen     "albuterol    senna-docusate sodium **AND** polyethylene glycol **AND** bisacodyl **AND** bisacodyl    Calcium Replacement - Follow Nurse / BPA Driven Protocol    dextrose    dextrose    glucagon (human recombinant)    Magnesium Standard Dose Replacement - Follow Nurse / BPA Driven Protocol    Phosphorus Replacement - Follow Nurse / BPA Driven Protocol    Potassium Replacement - Follow Nurse / BPA Driven Protocol    sodium chloride    sodium chloride    sodium chloride     Please refer to the medical record for a full medication list    Review of Systems:    Constitutional-- No Fever, chills or sweats.  Appetite decreased, and some malaise. No fatigue.  HEENT-- No new vision, hearing or throat complaints.  No epistaxis or oral sores.  Denies odynophagia or dysphagia. No headache, photophobia or neck stiffness.  CV-- No chest pain, palpitation or syncope  Resp-- No SOB/cough/Hemoptysis  GI- No nausea, vomiting, or diarrhea.  No hematochezia, melena, or hematemesis. Denies jaundice or chronic liver disease.  -- No dysuria, hematuria, or flank pain.  Denies hesitancy, urgency or burning with urination.  Self caths.  Has some back pain prior to arrival, but better now.   Lymph- no swollen lymph nodes in neck/axilla or groin.   Heme- No active bruising or bleeding; no Hx of DVT or PE.  MS-- no swelling or pain in the bones or joints of arms/legs.  No new back pain.  Neuro-- No acute focal weakness or numbness in the arms or legs.  No seizures.  Some dizziness as per HPI.  Skin--No rashes or lesions    Physical Exam:   Vital Signs   Temp:  [97.7 °F (36.5 °C)-98.2 °F (36.8 °C)] 97.8 °F (36.6 °C)  Heart Rate:  [] 92  Resp:  [17-18] 17  BP: ()/() 78/53    Blood pressure (!) 78/53, pulse 92, temperature 97.8 °F (36.6 °C), temperature source Oral, resp. rate 17, height 190.5 cm (75\"), weight 112 kg (246 lb 14.6 oz), SpO2 91%.  GENERAL: Awake and alert, in no acute distress. Appears older than stated age.  " Resting in chair.  HEENT:  Normocephalic, atraumatic.  Oropharynx without thrush. Dentition in fair repair. No cervical adenopathy. No neck masses.  Ears externally normal, Nose externally normal.  EYES: No conjunctival injection. No icterus. EOM full.  LYMPHATICS: No lymphadenopathy of the neck or axillary or inguinal regions.   HEART: No murmur, gallop, or pericardial friction rub. Reg rate rhythm  LUNGS: Clear to auscultation and percussion. No respiratory distress, no use of accessory muscles.  ABDOMEN: Soft, nontender, nondistended. No appreciable HSM.  Bowel sounds normal.  SKIN: Warm and dry without cutaneous eruptions.  No nodules.    PSYCHIATRIC: Mental status lucid. No confusion.  EXT:  No cellulitic change.  NEURO: Oriented to name, nonfocal.      Results Review:   I reviewed the patient's new clinical results.  I reviewed the patient's new imaging results and agree with the interpretation.  I reviewed the patient's other test results and agree with the interpretation    Results from last 7 days   Lab Units 01/28/24  0421 01/26/24  0413 01/25/24  1552   WBC 10*3/mm3 7.52 6.94 7.96   HEMOGLOBIN g/dL 11.0* 11.3* 13.0   HEMATOCRIT % 33.2* 34.6* 39.4   PLATELETS 10*3/mm3 198 195 243     Results from last 7 days   Lab Units 01/28/24  0421   SODIUM mmol/L 139   POTASSIUM mmol/L 4.0   CHLORIDE mmol/L 102   CO2 mmol/L 28.0   BUN mg/dL 37*   CREATININE mg/dL 0.99   GLUCOSE mg/dL 155*   CALCIUM mg/dL 9.2     Results from last 7 days   Lab Units 01/25/24  1552   ALK PHOS U/L 240*   BILIRUBIN mg/dL 0.6   ALT (SGPT) U/L 38   AST (SGOT) U/L 41*     Results from last 7 days   Lab Units 01/25/24  1552   SED RATE mm/hr 124*     Results from last 7 days   Lab Units 01/25/24  1813   CRP mg/dL 1.18*         Results from last 7 days   Lab Units 01/27/24  0943   LACTATE mmol/L 1.2     Estimated Creatinine Clearance: 71 mL/min (by C-G formula based on SCr of 0.99 mg/dL).     Procalitonin Results:      Lab 01/25/24  1811  "  PROCALCITONIN 0.04      Brief Urine Lab Results  (Last result in the past 365 days)        Color   Clarity   Blood   Leuk Est   Nitrite   Protein   CREAT   Urine HCG        01/25/24 1913 Yellow   Cloudy   Negative   Moderate (2+)   Positive   Negative                  No results found for: \"SITE\", \"ALLENTEST\", \"PHART\", \"WPL6FRP\", \"PO2ART\", \"RIG4YAM\", \"BASEEXCESS\", \"F6XADWDL\", \"HGBBG\", \"HCTABG\", \"OXYHEMOGLOBI\", \"METHHGBN\", \"CARBOXYHGB\", \"CO2CT\", \"BAROMETRIC\", \"MODALITY\", \"FIO2\"     Microbiology:  Microbiology Results (last 10 days)       Procedure Component Value - Date/Time    Blood Culture - Blood, Arm, Left [[id number]]  (Normal) Collected: 01/27/24 0943    Lab Status: Preliminary result Specimen: Blood from Arm, Left Updated: 01/28/24 1000     Blood Culture No growth at 24 hours    Blood Culture - Blood, Arm, Left [[id number]]  (Abnormal) Collected: 01/25/24 2014    Lab Status: Final result Specimen: Blood from Arm, Left Updated: 01/28/24 0639     Blood Culture Staphylococcus, coagulase negative     Isolated from Aerobic Bottle     Gram Stain Aerobic Bottle Gram positive cocci in groups    Narrative:      Probable contaminant requires clinical correlation, susceptibility not performed unless requested by physician.      Blood Culture - Blood, Arm, Right [[id number]]  (Normal) Collected: 01/25/24 2014    Lab Status: Preliminary result Specimen: Blood from Arm, Right Updated: 01/27/24 2045     Blood Culture No growth at 2 days    Blood Culture ID, PCR - Blood, Arm, Left [[id number]]  (Abnormal) Collected: 01/25/24 2014    Lab Status: Final result Specimen: Blood from Arm, Left Updated: 01/27/24 0829     BCID, PCR Staph spp, not aureus or lugdunensis. Identification by BCID2 PCR.     BOTTLE TYPE Aerobic Bottle    Urine Culture - Urine, Urine, Clean Catch [[id number]]  (Abnormal)  (Susceptibility) Collected: 01/25/24 1913    Lab Status: Final result Specimen: Urine, Clean Catch Updated: 01/27/24 0612     Urine Culture " >100,000 CFU/mL Escherichia coli    Narrative:      Colonization of the urinary tract without infection is common. Treatment is discouraged unless the patient is symptomatic, pregnant, or undergoing an invasive urologic procedure.    Susceptibility        Escherichia coli      JESSICA      Amikacin Susceptible      Amoxicillin + Clavulanate Susceptible      Ampicillin Resistant      Ampicillin + Sulbactam Intermediate      Cefazolin Susceptible      Cefepime Susceptible      Ceftazidime Susceptible      Ceftriaxone Susceptible      Gentamicin Resistant      Levofloxacin Resistant      Nitrofurantoin Susceptible      Piperacillin + Tazobactam Susceptible      Tobramycin Resistant      Trimethoprim + Sulfamethoxazole Resistant                                       Radiology:  Imaging Results (Last 72 Hours)       Procedure Component Value Units Date/Time    XR Chest 1 View [[id number]] Collected: 01/25/24 1550     Updated: 01/25/24 1555    Narrative:      XR CHEST 1 VW    Date of Exam: 1/25/2024 3:34 PM EST    Indication: Chest Pain Protocol    Comparison: Chest radiograph and chest CT 1/3/2024    Findings:  Cardiomediastinal silhouette is unchanged. Similar left hemidiaphragm with left basilar scarring/subsegmental atelectasis. No focal consolidation or overt pulmonary edema. No pleural effusion or pneumothorax. Osseous structures are unchanged.      Impression:      Impression:  No evidence of acute cardiopulmonary disease.       Electronically Signed: Surya Lion MD    1/25/2024 3:52 PM EST    Workstation ID: [id number]            IMPRESSION:   Recurrent GNR acute bacterial cystitis with h/o ESBL, not surprising giving the recurrent self cath for urinary retention.  E. coli, not ESBL from 1/25.  Staphylococcal species bacteremia, new.  Contaminant.  Not toxic.  Benign prostatic hypertrophy with urinary retention/self catheterizations 4 times a day.  Increases risk for recurrent UTI.  Paroxysmal atrial  fibrillation/Watchman procedure/Plavix  Type 2 diabetes mellitus, increased risk for infection.  Chronic kidney disease Stage IIIa  Obesity  Essential hypertension  Obstructive sleep apnea/CPAP  Penicillin allergy (hives). Tolerated Rocephin in past.   Anemia, chronic disease.  Minimally worse.      PLAN:  Diagnostically, continue to follow blood and urine cultures, physical examination, imaging, CBC, CMP, CRP.  Therapeutically, continue cefuroxime 500 mg p.o. twice daily until 2/2/24 to facilitate outpatient therapy.  Supportive care.  At risk for deconditioning.    I discussed the patient's findings and my recommendations with patient, nursing staff, and primary care team    Thank you for asking me to see Darien Camarena.  Our group would be pleased to follow this patient over the course of their hospitalization and assist with outpatient antimicrobial therapy, as indicated.  Further recommendations depend on the results of the cultures and clinical course.  Increased risk for adverse drug reactions, complications of IV access, readmission.    See next on Monday, call sooner if needed.  Follow-up should be scheduled with Dr. Og within the week.    Brandon Philippe MD  1/28/2024

## 2024-01-28 NOTE — PROGRESS NOTES
[facility] Medicine Services  PROGRESS NOTE    Patient Name: Darien Camarena  : 1935  MRN: [id number]    Date of Admission: 2024  Primary Care Physician: Pito Way MD    Subjective   Subjective     CC:  Dizziness    HPI:   - Patient is an 88-year-old who presented to the emergency department with complaints of dizziness and hypotension.  He was also recently treated for UTI.  Given history of A-fib cardiology was consulted and medications adjusted.  Today he is feeling better and denies current dizziness.  Blood pressure noted to still be low less than 100 systolic.  Tachycardia also noted.     -patient with positive blood culture gram-positive cocci, possibly contaminant.  Repeat blood cultures requested.  Blood pressure is improved today and patient feels less dizzy.  Infectious disease changed antibiotics to Merrem anticipated through 24.  Urine culture growing E. coli.     -persistent orthostasis today with blood pressure dropping to the 70s systolic with standing.  Also noted patient was transiently in A-fib with RVR and heart rate in the 130s.  Cardiology is following.  Infectious disease changed to oral antibiotics.      Objective   Objective     Vital Signs:   Temp:  [97.8 °F (36.6 °C)-98.2 °F (36.8 °C)] 97.8 °F (36.6 °C)  Heart Rate:  [] 82  Resp:  [17-18] 17  BP: ()/() 130/85     Physical Exam:  Constitutional: No acute distress, awake, alert  HENT: NCAT, mucous membranes moist  Respiratory: Clear to auscultation bilaterally, respiratory effort normal   Cardiovascular: Regular rate and rhythm, orthostatic blood pressures noted with systolic dropping to the 70s with standing (systolic was 126 when sitting)  Gastrointestinal: Positive bowel sounds, soft, nontender, nondistended  Musculoskeletal: No bilateral ankle edema  Psychiatric: Appropriate affect, cooperative  Neurologic: Oriented x 3, strength symmetric in all  extremities, Cranial Nerves grossly intact to confrontation, speech clear  Skin: No rashes      Results Reviewed:  LAB RESULTS:      Lab 01/28/24  0421 01/27/24  0943 01/26/24 0413 01/25/24 2329 01/25/24 2032 01/25/24  1813 01/25/24  1552   WBC 7.52  --  6.94  --   --   --  7.96   HEMOGLOBIN 11.0*  --  11.3*  --   --   --  13.0   HEMATOCRIT 33.2*  --  34.6*  --   --   --  39.4   PLATELETS 198  --  195  --   --   --  243   NEUTROS ABS 5.04  --   --   --   --   --  5.65   IMMATURE GRANS (ABS) 0.11*  --   --   --   --   --  0.05   LYMPHS ABS 1.43  --   --   --   --   --  1.46   MONOS ABS 0.57  --   --   --   --   --  0.46   EOS ABS 0.32  --   --   --   --   --  0.27   MCV 97.9*  --  96.6  --   --   --  98.0*   SED RATE  --   --   --   --   --   --  124*   CRP  --   --   --   --   --  1.18*  --    PROCALCITONIN  --   --   --   --   --  0.04  --    LACTATE  --  1.2  --  1.9 2.2*  --  2.9*         Lab 01/28/24  0421 01/27/24  0351 01/26/24 0413 01/25/24  1552   SODIUM 139 139 137 136   POTASSIUM 4.0 4.0 3.7 4.6   CHLORIDE 102 100 99 96*   CO2 28.0 31.0* 28.0 27.0   ANION GAP 9.0 8.0 10.0 13.0   BUN 37* 38* 49* 52*   CREATININE 0.99 0.95 1.14 1.21   EGFR 73.7 77.5 62.2 57.9*   GLUCOSE 155* 215* 178* 138*   CALCIUM 9.2 8.9 8.9 9.3   MAGNESIUM 1.7 1.7 1.7 1.7         Lab 01/25/24  1552   TOTAL PROTEIN 7.3   ALBUMIN 3.4*   GLOBULIN 3.9   ALT (SGPT) 38   AST (SGOT) 41*   BILIRUBIN 0.6   ALK PHOS 240*         Lab 01/25/24  1813 01/25/24  1552   PROBNP  --  1,428.0   HSTROP T 42* 42*                 Brief Urine Lab Results  (Last result in the past 365 days)        Color   Clarity   Blood   Leuk Est   Nitrite   Protein   CREAT   Urine HCG        01/25/24 1913 Yellow   Cloudy   Negative   Moderate (2+)   Positive   Negative                   Microbiology Results Abnormal       Procedure Component Value - Date/Time    Blood Culture - Blood, Arm, Left [[id number]]  (Normal) Collected: 01/27/24 0943    Lab Status: Preliminary  result Specimen: Blood from Arm, Left Updated: 01/28/24 1000     Blood Culture No growth at 24 hours    Blood Culture - Blood, Arm, Right [[id number]]  (Normal) Collected: 01/25/24 2014    Lab Status: Preliminary result Specimen: Blood from Arm, Right Updated: 01/27/24 2045     Blood Culture No growth at 2 days            Adult Transthoracic Echo Limited W/ Cont if Necessary Per Protocol    Result Date: 1/27/2024    Left ventricular systolic function is low normal. Calculated left ventricular EF = 47.8% Left ventricular ejection fraction appears to be 46 - 50%.      Results for orders placed during the hospital encounter of 01/25/24    Adult Transthoracic Echo Limited W/ Cont if Necessary Per Protocol    Interpretation Summary    Left ventricular systolic function is low normal. Calculated left ventricular EF = 47.8% Left ventricular ejection fraction appears to be 46 - 50%.      Current medications:  Scheduled Meds:allopurinol, 300 mg, Oral, Daily  aspirin, 81 mg, Oral, Daily  cefuroxime, 500 mg, Oral, Q12H  clopidogrel, 75 mg, Oral, Daily  digoxin, 250 mcg, Oral, Daily  donepezil, 10 mg, Oral, Nightly  finasteride, 5 mg, Oral, Nightly  heparin (porcine), 5,000 Units, Subcutaneous, Q8H  insulin lispro, 2-7 Units, Subcutaneous, 4x Daily AC & at Bedtime  lactobacillus acidophilus, 1 capsule, Oral, Daily  memantine, 10 mg, Oral, BID  metoprolol tartrate, 12.5 mg, Oral, BID  midodrine, 5 mg, Oral, TID AC  multivitamin with minerals, 1 tablet, Oral, Nightly  pantoprazole, 40 mg, Oral, Q AM  pravastatin, 40 mg, Oral, Nightly  senna-docusate sodium, 2 tablet, Oral, BID  sertraline, 50 mg, Oral, Daily  sodium chloride, 10 mL, Intravenous, Q12H  tamsulosin, 0.4 mg, Oral, Daily  tiotropium bromide monohydrate, 2 puff, Inhalation, Daily - RT      Continuous Infusions:   PRN Meds:.  acetaminophen    albuterol    senna-docusate sodium **AND** polyethylene glycol **AND** bisacodyl **AND** bisacodyl    Calcium Replacement - Follow  Nurse / BPA Driven Protocol    dextrose    dextrose    glucagon (human recombinant)    Magnesium Standard Dose Replacement - Follow Nurse / BPA Driven Protocol    Phosphorus Replacement - Follow Nurse / BPA Driven Protocol    Potassium Replacement - Follow Nurse / BPA Driven Protocol    sodium chloride    sodium chloride    sodium chloride    Assessment & Plan   Assessment & Plan     Active Hospital Problems    Diagnosis  POA    **Hypotension [I95.9]  Yes    Low left ventricular ejection fraction [R94.30]  Yes    Sepsis [A41.9]  Yes    Recurrent UTI [N39.0]  Yes    Presence of Watchman left atrial appendage closure device [Z95.818]  Yes    Stage 3 chronic kidney disease [N18.30]  Yes    Persistent atrial fibrillation [I48.19]  Yes    Enlarged prostate without lower urinary tract symptoms (luts) [N40.0]  Yes    Gastroesophageal reflux disease with esophagitis [K21.00]  Yes    Hyperlipidemia LDL goal <100 [E78.5]  Yes    Essential hypertension [I10]  Yes    Type 2 diabetes mellitus with stage 3 chronic kidney disease, without long-term current use of insulin [E11.22, N18.30]  Yes    Obstructive sleep apnea syndrome [G47.33]  Yes      Resolved Hospital Problems   No resolved problems to display.        Brief Hospital Course to date:  Darien Camarena is a 88 y.o. male admitted with symptomatic hypotension (dizziness) and tachycardia.  Recently treated for complicated UTI and noted history of urinary retention requiring self in and out cath.    Symptomatic orthostatic hypotension  Tachycardia  A-fib with Watchman device  Chronic systolic congestive heart failure  -Holding diuretics and antihypertensives except metoprolol at lower dose  -Added midodrine  -Cardiology consulted  -persistent orthostasis with a 48 point drop in systolic blood pressure when going from sitting to standing  -Consider fluids or increasing midodrine    CKD stage III    COPD    Enlarged prostate  Urinary retention  Recent UTI  -ID consulted for  management of antibiotics, initially treated with Merrem, now transition to oral Ceftin with plan to continue through February 2, 2024  -In and out cath every 8 and as needed  -Continue Flomax    Diabetes mellitus type 2  -Fingerstick blood sugar range 1 38-2 15  -Hemoglobin A1c was 6.20 in October 23  -Sliding scale insulin as needed    Dementia    Depression        Expected Discharge Location and Transportation: Discharge home  Expected Discharge   Expected Discharge Date: 1/29/2024; Expected Discharge Time:      DVT prophylaxis:  Medical DVT prophylaxis orders are present.         AM-PAC 6 Clicks Score (PT): 19 (01/28/24 0800)    CODE STATUS:   Code Status and Medical Interventions:   Ordered at: 01/25/24 6494     Code Status (Patient has no pulse and is not breathing):    CPR (Attempt to Resuscitate)     Medical Interventions (Patient has pulse or is breathing):    Full Support       Valeria Wilkins MD  01/28/24

## 2024-01-29 ENCOUNTER — TELEPHONE (OUTPATIENT)
Dept: UROLOGY | Facility: CLINIC | Age: 89
End: 2024-01-29
Payer: MEDICARE

## 2024-01-29 PROBLEM — I50.22 CHRONIC SYSTOLIC HEART FAILURE: Status: ACTIVE | Noted: 2024-01-29

## 2024-01-29 PROBLEM — I50.32 CHRONIC DIASTOLIC HEART FAILURE: Status: ACTIVE | Noted: 2024-01-26

## 2024-01-29 PROBLEM — I50.20 SYSTOLIC HEART FAILURE: Status: ACTIVE | Noted: 2024-01-26

## 2024-01-29 PROBLEM — I50.22 CHRONIC SYSTOLIC HEART FAILURE: Status: ACTIVE | Noted: 2024-01-26

## 2024-01-29 LAB
ANION GAP SERPL CALCULATED.3IONS-SCNC: 9 MMOL/L (ref 5–15)
BASOPHILS # BLD AUTO: 0.07 10*3/MM3 (ref 0–0.2)
BASOPHILS NFR BLD AUTO: 0.9 % (ref 0–1.5)
BUN SERPL-MCNC: 34 MG/DL (ref 8–23)
BUN/CREAT SERPL: 39.5 (ref 7–25)
CALCIUM SPEC-SCNC: 9 MG/DL (ref 8.6–10.5)
CHLORIDE SERPL-SCNC: 107 MMOL/L (ref 98–107)
CO2 SERPL-SCNC: 28 MMOL/L (ref 22–29)
CREAT SERPL-MCNC: 0.86 MG/DL (ref 0.76–1.27)
DEPRECATED RDW RBC AUTO: 48.8 FL (ref 37–54)
DIGOXIN SERPL-MCNC: 0.9 NG/ML (ref 0.6–1.2)
EGFRCR SERPLBLD CKD-EPI 2021: 83.8 ML/MIN/1.73
EOSINOPHIL # BLD AUTO: 0.4 10*3/MM3 (ref 0–0.4)
EOSINOPHIL NFR BLD AUTO: 5.1 % (ref 0.3–6.2)
ERYTHROCYTE [DISTWIDTH] IN BLOOD BY AUTOMATED COUNT: 14 % (ref 12.3–15.4)
GLUCOSE BLDC GLUCOMTR-MCNC: 151 MG/DL (ref 70–130)
GLUCOSE BLDC GLUCOMTR-MCNC: 186 MG/DL (ref 70–130)
GLUCOSE BLDC GLUCOMTR-MCNC: 199 MG/DL (ref 70–130)
GLUCOSE BLDC GLUCOMTR-MCNC: 245 MG/DL (ref 70–130)
GLUCOSE SERPL-MCNC: 154 MG/DL (ref 65–99)
HBA1C MFR BLD: 6.1 % (ref 4.8–5.6)
HCT VFR BLD AUTO: 31.4 % (ref 37.5–51)
HGB BLD-MCNC: 10.6 G/DL (ref 13–17.7)
IMM GRANULOCYTES # BLD AUTO: 0.07 10*3/MM3 (ref 0–0.05)
IMM GRANULOCYTES NFR BLD AUTO: 0.9 % (ref 0–0.5)
LYMPHOCYTES # BLD AUTO: 1.36 10*3/MM3 (ref 0.7–3.1)
LYMPHOCYTES NFR BLD AUTO: 17.3 % (ref 19.6–45.3)
MCH RBC QN AUTO: 33.2 PG (ref 26.6–33)
MCHC RBC AUTO-ENTMCNC: 33.8 G/DL (ref 31.5–35.7)
MCV RBC AUTO: 98.4 FL (ref 79–97)
MONOCYTES # BLD AUTO: 0.44 10*3/MM3 (ref 0.1–0.9)
MONOCYTES NFR BLD AUTO: 5.6 % (ref 5–12)
NEUTROPHILS NFR BLD AUTO: 5.51 10*3/MM3 (ref 1.7–7)
NEUTROPHILS NFR BLD AUTO: 70.2 % (ref 42.7–76)
NRBC BLD AUTO-RTO: 0 /100 WBC (ref 0–0.2)
PLATELET # BLD AUTO: 197 10*3/MM3 (ref 140–450)
PMV BLD AUTO: 10.1 FL (ref 6–12)
POTASSIUM SERPL-SCNC: 3.9 MMOL/L (ref 3.5–5.2)
RBC # BLD AUTO: 3.19 10*6/MM3 (ref 4.14–5.8)
SODIUM SERPL-SCNC: 144 MMOL/L (ref 136–145)
WBC NRBC COR # BLD AUTO: 7.85 10*3/MM3 (ref 3.4–10.8)

## 2024-01-29 PROCEDURE — 80162 ASSAY OF DIGOXIN TOTAL: CPT | Performed by: PHYSICIAN ASSISTANT

## 2024-01-29 PROCEDURE — 82948 REAGENT STRIP/BLOOD GLUCOSE: CPT

## 2024-01-29 PROCEDURE — 97530 THERAPEUTIC ACTIVITIES: CPT

## 2024-01-29 PROCEDURE — 83036 HEMOGLOBIN GLYCOSYLATED A1C: CPT | Performed by: INTERNAL MEDICINE

## 2024-01-29 PROCEDURE — 99232 SBSQ HOSP IP/OBS MODERATE 35: CPT | Performed by: INTERNAL MEDICINE

## 2024-01-29 PROCEDURE — 25810000003 SODIUM CHLORIDE 0.9 % SOLUTION: Performed by: INTERNAL MEDICINE

## 2024-01-29 PROCEDURE — 80048 BASIC METABOLIC PNL TOTAL CA: CPT | Performed by: FAMILY MEDICINE

## 2024-01-29 PROCEDURE — 85025 COMPLETE CBC W/AUTO DIFF WBC: CPT | Performed by: FAMILY MEDICINE

## 2024-01-29 PROCEDURE — 94799 UNLISTED PULMONARY SVC/PX: CPT

## 2024-01-29 PROCEDURE — 63710000001 INSULIN LISPRO (HUMAN) PER 5 UNITS: Performed by: FAMILY MEDICINE

## 2024-01-29 PROCEDURE — 25010000002 HEPARIN (PORCINE) PER 1000 UNITS: Performed by: STUDENT IN AN ORGANIZED HEALTH CARE EDUCATION/TRAINING PROGRAM

## 2024-01-29 RX ORDER — METOPROLOL TARTRATE 100 MG/1
100 TABLET ORAL 2 TIMES DAILY
Status: DISCONTINUED | OUTPATIENT
Start: 2024-01-29 | End: 2024-01-30 | Stop reason: HOSPADM

## 2024-01-29 RX ORDER — DIGOXIN 125 MCG
125 TABLET ORAL DAILY
Status: DISCONTINUED | OUTPATIENT
Start: 2024-01-30 | End: 2024-01-30 | Stop reason: HOSPADM

## 2024-01-29 RX ADMIN — SERTRALINE HYDROCHLORIDE 50 MG: 50 TABLET ORAL at 08:02

## 2024-01-29 RX ADMIN — SODIUM CHLORIDE 1000 ML: 9 INJECTION, SOLUTION INTRAVENOUS at 17:36

## 2024-01-29 RX ADMIN — METOPROLOL TARTRATE 100 MG: 100 TABLET, FILM COATED ORAL at 21:40

## 2024-01-29 RX ADMIN — CEFUROXIME AXETIL 500 MG: 250 TABLET, FILM COATED ORAL at 21:40

## 2024-01-29 RX ADMIN — TIOTROPIUM BROMIDE INHALATION SPRAY 2 PUFF: 3.12 SPRAY, METERED RESPIRATORY (INHALATION) at 09:29

## 2024-01-29 RX ADMIN — CEFUROXIME AXETIL 500 MG: 250 TABLET, FILM COATED ORAL at 08:02

## 2024-01-29 RX ADMIN — Medication 1 TABLET: at 21:40

## 2024-01-29 RX ADMIN — INSULIN LISPRO 3 UNITS: 100 INJECTION, SOLUTION INTRAVENOUS; SUBCUTANEOUS at 21:40

## 2024-01-29 RX ADMIN — ALLOPURINOL 300 MG: 300 TABLET ORAL at 08:02

## 2024-01-29 RX ADMIN — INSULIN LISPRO 2 UNITS: 100 INJECTION, SOLUTION INTRAVENOUS; SUBCUTANEOUS at 08:10

## 2024-01-29 RX ADMIN — INSULIN LISPRO 2 UNITS: 100 INJECTION, SOLUTION INTRAVENOUS; SUBCUTANEOUS at 17:36

## 2024-01-29 RX ADMIN — CLOPIDOGREL BISULFATE 75 MG: 75 TABLET ORAL at 08:02

## 2024-01-29 RX ADMIN — TAMSULOSIN HYDROCHLORIDE 0.4 MG: 0.4 CAPSULE ORAL at 08:02

## 2024-01-29 RX ADMIN — Medication 1 CAPSULE: at 08:02

## 2024-01-29 RX ADMIN — DONEPEZIL HYDROCHLORIDE 10 MG: 10 TABLET, FILM COATED ORAL at 21:39

## 2024-01-29 RX ADMIN — INSULIN LISPRO 2 UNITS: 100 INJECTION, SOLUTION INTRAVENOUS; SUBCUTANEOUS at 12:25

## 2024-01-29 RX ADMIN — DIGOXIN 250 MCG: 250 TABLET ORAL at 08:02

## 2024-01-29 RX ADMIN — HEPARIN SODIUM 5000 UNITS: 5000 INJECTION INTRAVENOUS; SUBCUTANEOUS at 21:40

## 2024-01-29 RX ADMIN — MEMANTINE 10 MG: 10 TABLET ORAL at 08:02

## 2024-01-29 RX ADMIN — HEPARIN SODIUM 5000 UNITS: 5000 INJECTION INTRAVENOUS; SUBCUTANEOUS at 05:40

## 2024-01-29 RX ADMIN — FINASTERIDE 5 MG: 5 TABLET, FILM COATED ORAL at 21:39

## 2024-01-29 RX ADMIN — PANTOPRAZOLE SODIUM 40 MG: 40 TABLET, DELAYED RELEASE ORAL at 05:40

## 2024-01-29 RX ADMIN — PRAVASTATIN SODIUM 40 MG: 40 TABLET ORAL at 21:39

## 2024-01-29 RX ADMIN — METOPROLOL TARTRATE 25 MG: 25 TABLET, FILM COATED ORAL at 08:02

## 2024-01-29 RX ADMIN — HEPARIN SODIUM 5000 UNITS: 5000 INJECTION INTRAVENOUS; SUBCUTANEOUS at 13:43

## 2024-01-29 RX ADMIN — MEMANTINE 10 MG: 10 TABLET ORAL at 21:39

## 2024-01-29 RX ADMIN — Medication 10 ML: at 08:03

## 2024-01-29 RX ADMIN — MIDODRINE HYDROCHLORIDE 5 MG: 5 TABLET ORAL at 08:02

## 2024-01-29 RX ADMIN — ASPIRIN 81 MG: 81 TABLET, COATED ORAL at 08:02

## 2024-01-29 NOTE — PAYOR COMM NOTE
"Ref# [id number]   Still pending  Clinical Update    Utilization Review  Phone [phone]  Fax [phone]    [address] [address]       Darien Camarena \"Isidro\" (88 y.o. Male)       Date of Birth   1935    Social Security Number       Address   [place] [id number]    Home Phone   [phone]    MRN   [id number]       Jehovah's witness   Catholic    Marital Status                               Admission Date   1/25/24    Admission Type   Emergency    Admitting Provider   Tyree Jim MD    Attending Provider   Tyree Jim MD    Department, Room/Bed   [facility] 5G, S561/1       Discharge Date       Discharge Disposition       Discharge Destination                                 Attending Provider: Tyree Jim MD    Allergies: Xarelto [Rivaroxaban], Penicillins    Isolation: Contact   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), 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), ESBL Klebsiella (06/09/23)   Code Status: CPR    Ht: 190.5 cm (75\")   Wt: 112 kg (246 lb 14.6 oz)    Admission Cmt: None   Principal Problem: Hypotension [I95.9]                   Active Insurance as of 1/25/2024       Primary Coverage       Payor Plan Insurance Group Employer/Plan Group    ANTH MEDICARE REPLACEMENT ANTH MEDICARE ADVANTAGE [id number]       Payor Plan Address Payor Plan Phone Number Payor Plan Fax Number Effective Dates    [address] [phone]  1/1/2024 - None Entered    [facility] [id number]         Subscriber Name Subscriber Birth Date Member ID       DARIEN CAMARENA 1935 [id number]                     Emergency Contacts        (Rel.) Home Phone Work Phone Mobile Phone    SHAWN KWON (Daughter) [phone] -- [phone]    NEIL QUINTERO (Daughter) [phone] -- [phone]    Dolly Taylor (Daughter) -- -- [phone]                 Physician Progress Notes (all)        Blanquita Ansari APRN at 01/29/24 0854        "   Cardiology Progress Note      Reason for visit:    Hypotension in the setting of urosepsis  Persistent atrial fibrillation  Reduced LVEF/systolic heart failure    IDENTIFICATION: 88-year-old gentleman who resides in [place], [place]    Active Hospital Problems    Diagnosis  POA    **Hypotension [I95.9]  Yes     Priority: High    Chronic systolic heart failure [I50.22]  Yes     Priority: High     Echo (8/8/2022): EF 50%, anatomically and functionally normal valves  FARHAD (1/12/2024): LVEF = 40%.  27 mm Watchman device well-seated.  No thrombus or periprosthetic flow.  Mild MR but no significant valvular abnormality  Echo (1/27/2024): LVEF 46-50%      Sepsis [A41.9]  Yes     Priority: High    Recurrent UTI [N39.0]  Yes     Priority: High    Stage 3 chronic kidney disease [N18.30]  Yes     Priority: High    Persistent atrial fibrillation [I48.19]  Yes     Priority: High     Diagnosed August 2012.   FARHAD-guided ECV, 8/17/2012  CHADS-VASc 5 (age > 75, CAD, HTN, DM)  Successful external cardioversion for asymptomatic atrial fibrillation with RVR, 10/25/18  Successful cardioversion for atrial fibrillation RVR, 3/6/19.  Sotalol increased  Minimally symptomatic atrial fibrillation with cardioversion and the ER, 10/31/2019  ED cardioversion for A. fib with RVR, 7/21/2020  ED cardioversion for asymptomatic A. fib with RVR, 12/27/2020   ED cardioversion for asymptomatic A. fib with RVR x 2  occasions, March 2021  Transition from sotalol to amiodarone, 4/14/2021  Successful FARHAD/ECV May 3, 2021  Successful cardioversion, 8/5/2022  Unsuccessful cardioversion 6/2023  Echo (8/8/2022): EF 50%, anatomically and functionally normal valves  Successful insertion of 27 mm Watchman device/left atrial pended occlusive device with Dr. Mcdaniel 11/28/2023  FARHAD (1/12/2024): LVEF = 40%.  27 mm Watchman device well-seated.  No thrombus or periprosthetic flow.  Mild MR but no significant valvular abnormality        Type 2 diabetes mellitus with  stage 3 chronic kidney disease, without long-term current use of insulin [E11.22, N18.30]  Yes     Priority: High    Presence of Watchman left atrial appendage closure device [Z95.818]  Yes     Priority: Low    Gastroesophageal reflux disease with esophagitis [K21.00]  Yes     Priority: Low    Hyperlipidemia LDL goal <100 [E78.5]  Yes     Priority: Low     Moderate intensity statin therapy reasonable due to diabetic status      Essential hypertension [I10]  Yes     Priority: Low     Target blood pressure <130/80 mmHg      Obstructive sleep apnea syndrome [G47.33]  Yes     Priority: Low     Compliant with CPAP      Enlarged prostate without lower urinary tract symptoms (luts) [N40.0]  Yes       Subjective     Patient is lying flat in bed breathing easy on room air.  He is in rate controlled atrial fibrillation.  For which she is asymptomatic.  He denies any chest pain or shortness of breath.  Repeat limited echo this admission showed LVEF 46%        Objective   Vital Sign Min/Max for last 24 hours  Temp  Min: 97.7 °F (36.5 °C)  Max: 97.9 °F (36.6 °C)   BP  Min: 78/53  Max: 137/72   Pulse  Min: 81  Max: 118   Resp  Min: 16  Max: 17   SpO2  Min: 93 %  Max: 95 %   No data recorded      Intake/Output Summary (Last 24 hours) at 1/29/2024 0858  Last data filed at 1/29/2024 0610  Gross per 24 hour   Intake 950 ml   Output 1975 ml   Net -1025 ml           Physical Exam  Constitutional:       General: He is awake.   Neck:      Vascular: No JVD.   Cardiovascular:      Rate and Rhythm: Normal rate. Rhythm irregularly irregular.      Heart sounds: No murmur heard.  Pulmonary:      Effort: Pulmonary effort is normal.      Breath sounds: Normal breath sounds.   Musculoskeletal:      Right lower leg: No edema.      Left lower leg: No edema.   Skin:     General: Skin is warm and dry.   Neurological:      Mental Status: He is alert and oriented to person, place, and time.   Psychiatric:         Behavior: Behavior is cooperative.          Tele: Rate controlled atrial fibrillation    Results Review (reviewed the patient's recent labs in the electronic medical record):     EKG (1/25/2024): Atrial fibrillation with RVR at 105 bpm    CXR (1/25/2024): No acute cardiopulmonary disease    CT angiogram of the chest (1/3/2024): No PE    ECHO (1/27/2024): LVEF 46-50%    Results from last 7 days   Lab Units 01/29/24  0447 01/28/24  0421 01/27/24  0351 01/26/24  0413 01/25/24  1552   SODIUM mmol/L 144 139 139 137 136   POTASSIUM mmol/L 3.9 4.0 4.0 3.7 4.6   CHLORIDE mmol/L 107 102 100 99 96*   BUN mg/dL 34* 37* 38* 49* 52*   CREATININE mg/dL 0.86 0.99 0.95 1.14 1.21   MAGNESIUM mg/dL  --  1.7 1.7 1.7 1.7     Results from last 7 days   Lab Units 01/25/24  1813 01/25/24  1552   HSTROP T ng/L 42* 42*     Results from last 7 days   Lab Units 01/29/24 0447 01/28/24  0421 01/26/24  0413   WBC 10*3/mm3 7.85 7.52 6.94   HEMOGLOBIN g/dL 10.6* 11.0* 11.3*   HEMATOCRIT % 31.4* 33.2* 34.6*   PLATELETS 10*3/mm3 197 198 195       Lab Results   Component Value Date    HGBA1C 6.20 (H) 10/30/2023       Lab Results   Component Value Date    CHOL 98 10/30/2023    TRIG 127 10/30/2023    HDL 34 (L) 10/30/2023    LDL 41 10/30/2023         Assessment     Hypotension  In the setting of possible urosepsis  Continue to hold metolazone, Lasix and valsartan  Agree with midodrine 5 mg 3 times daily as started by hospitalist  Current /72     Persistent atrial fibrillation/recent Watchman device  Currently in A-fib with rates not well-controlled  Has failed sotalol therapy in the past.  Evaluated by EP and amiodarone discontinued due to concerns of toxicity.  Metoprolol was increased to 100 twice daily for rate control as it was felt patient was asymptomatic with his A-fib   Watchman device placed 11/28 by Dr. Mcdaniel as patient was having recurrent hematuria and intolerant to anticoagulation  Defer NOAC due to Watchman device as recent FARHAD showed well-seated device  Continue  aspirin and Plavix for Watchman device  Digoxin 250 mcg daily.  Current dig level within normal limits.  Will decrease to 125 mcg daily at discharge  Metoprolol tartrate 25 mg twice daily  May need monitor at discharge to assess for rate control of atrial fibrillation     Hyperlipidemia  Pravastatin 40 mg daily     Reduced LVEF/systolic heart failure  FARHAD performed earlier this month showed a reduced LVEF of 40% which appears new  Could be tachycardic induced from rates not being well-controlled from A-fib as patient had minimal CAD noted on cath 2022   Currently appears euvolemic and compensated  Repeat limited echo shows LVEF 46 to 50%.        UTI/recurrent in nature  Management per hospitalist and infectious disease  History of ESBL  E. coli this admission but not ESBL        Plan     Decrease digoxin 125 mcg daily  Continue metoprolol  Continue midodrine for pressure support  Consider monitor at discharge to assess rate control of atrial fibrillation    Electronically signed by BERYL Buitrago, 01/29/24, 8:54 AM EST.    Electronically signed by Blanquita Ansari APRN at 01/29/24 1111       Brandon Philippe MD at 01/29/24 0745              [facility]    INFECTIOUS DISEASE PROGRESS NOTE    Darien Camarena  1935  [id number]    Date of consult: 1/26/2024    Admit date: 1/25/2024    Requesting Provider: Anthony Franklin MD  Evaluating physician: Brandon Philippe MD  Reason for Consultation: Possible recurrent UTI with h/o ESBL, E. coli on urine culture from 1/25  Chief Complaint: Symptoms similar to recent hospitalization on 1/5 with possible UTI.      Subjective   History of present illness:  Patient is a  88 y.o. male, known to Dr. Last Og, with h/o Afib/Watchman device, COPD, T2DM, CKD Stage III, STEVEN/CPAP, HTN, HLD, recurrent UTIs, ESBL, and BPH/urinary retention/self catheterization 4 times a day who was recently treated ESBL E.coli UTI with Invanz for 7 days.  He  returned to [facility] ED on 1/25 for lightheadedness, fogginess, and warm sensation from sitting to standing for the past days PTA.  He denies dysuria or hematuria. On arrival, the patient is afebrile with , BP 81/62, and remains on room air with some hypoxia.  Initial labs were , lactic acid 2.9, proBNP 1428, WBC 8000 with 71% neutrophils, creatinine 1.21, CRP 1.15, and PCT 0.04.  A UA WBC was 21-50 with moderate LE and positive nitrite and culture positive for GNR. Blood cultures are pending.  A CXR showed no acute findings.  He is currently on Rocephin.  ID was asked on 1/26 to evaluate and manage his antibiotic therapy.  He has minimal complaints in terms of his urinary tract.  He is feeling better.    1/27/2024 history reviewed.  Tolerating antibiotics with meropenem.  No high fever.  Urine culture with E. coli, not obvious ESBL, resistant to levofloxacin and sulfa as well as ampicillin.  Blood culture positive for staphylococcal species, not lugdunensis or aureus.  Patient has allergy to penicillin but has tolerated cephalosporins.    1/28/2024 history reviewed.  Tolerating cefuroxime for E. coli cystitis.  No high fevers.    1/29/2024 history reviewed.  Continues on cefuroxime for E. coli cystitis.  No high fever.  No abdominal pain.  Feels well.    Past Medical History:   Diagnosis Date    Arrhythmia     Atrial fibrillation     Bilateral leg cramps     possible new medication related side effects    Chronic kidney disease     Clotting disorder     pt doesnt know about this    COPD (chronic obstructive pulmonary disease)     Coronary artery disease     Diabetes mellitus     doesnt check sugar    E. coli sepsis 06/23/2022    Enlarged prostate without lower urinary tract symptoms (luts) 06/20/2016    Full dentures     GERD (gastroesophageal reflux disease)     Gout     Hearing aid worn     bilat prn    History of colonoscopy 09/12/2012    History of radiation therapy 02/24/2023    SBRT LLL lung    [place]  (hard of hearing)     hearing aids prn    Hyperlipidemia     Hypertension     Kidney stone     surgery x1    Lung cancer     STEVEN on CPAP     compliant with machine    PAF (paroxysmal atrial fibrillation)     Prostatism     Sleep apnea     CPAP HS    Urinary incontinence     Urinary tract infection     Wears glasses     readers       Past Surgical History:   Procedure Laterality Date    ATRIAL APPENDAGE EXCLUSION LEFT WITH TRANSESOPHAGEAL ECHOCARDIOGRAM N/A 11/28/2023    Procedure: Atrial Appendage Occlusion;  Surgeon: Anthony Mcdaniel MD;  Location:  [facility];  Service: Cardiovascular;  Laterality: N/A;    CARDIAC CATHETERIZATION Left 09/29/2022    Procedure: Left Heart Cath;  Surgeon: Titus Oliveros IV, MD;  Location:  [facility];  Service: Cardiovascular;  Laterality: Left;    CARDIOVERSION      CATARACT EXTRACTION Bilateral     COLONOSCOPY      CYSTOSCOPY      ENDOSCOPY      possible    KIDNEY STONE SURGERY      x1       Pediatric History   Patient Parents    Not on file     Other Topics Concern    Not on file   Social History Narrative    Caffeine: 1 cup of decaff coffee daily        family history includes Alzheimer's disease in his mother; COPD in his father; Cancer in his father; Kidney disease in his father; Lung cancer in his father; No Known Problems in his daughter, daughter, and daughter.    Allergies   Allergen Reactions    Xarelto [Rivaroxaban] GI Bleeding     GI bleed    Penicillins Hives     Has tolerated cefepime, ceftriaxone, cefazolin, cefdinir, cefuroxime, cephalexin       Immunization History   Administered Date(s) Administered    31-influenza Vac Quardvalent Preservativ 11/01/2018, 10/16/2019    COVID-19 (PFIZER) BIVALENT 12+YRS 12/22/2022    COVID-19 (PFIZER) Purple Cap Monovalent 01/26/2021, 02/19/2021    Fluzone High Dose =>65 Years ([facility]) 10/01/2016, 09/18/2017, 09/15/2018, 10/12/2019, 09/18/2020, 09/25/2021    Fluzone High-Dose 65+yrs  09/19/2020, 09/25/2021, 12/22/2022, 10/30/2023    Hepatitis A 09/15/2018, 11/01/2018    Pneumococcal Conjugate 13-Valent (PCV13) 10/26/2015    Pneumococcal Polysaccharide (PPSV23) 06/24/2006, 09/18/2020    Pneumococcal, Unspecified 11/30/2006    Shingrix 09/25/2021, 04/15/2023    Td (TDVAX) 06/24/2005    Tdap 06/14/2018       Medication:  @Scheduled Meds:allopurinol, 300 mg, Oral, Daily  aspirin, 81 mg, Oral, Daily  cefuroxime, 500 mg, Oral, Q12H  clopidogrel, 75 mg, Oral, Daily  digoxin, 250 mcg, Oral, Daily  donepezil, 10 mg, Oral, Nightly  finasteride, 5 mg, Oral, Nightly  heparin (porcine), 5,000 Units, Subcutaneous, Q8H  insulin lispro, 2-7 Units, Subcutaneous, 4x Daily AC & at Bedtime  lactobacillus acidophilus, 1 capsule, Oral, Daily  memantine, 10 mg, Oral, BID  metoprolol tartrate, 25 mg, Oral, BID  midodrine, 5 mg, Oral, TID AC  multivitamin with minerals, 1 tablet, Oral, Nightly  pantoprazole, 40 mg, Oral, Q AM  pravastatin, 40 mg, Oral, Nightly  senna-docusate sodium, 2 tablet, Oral, BID  sertraline, 50 mg, Oral, Daily  sodium chloride, 10 mL, Intravenous, Q12H  tamsulosin, 0.4 mg, Oral, Daily  tiotropium bromide monohydrate, 2 puff, Inhalation, Daily - RT      Continuous Infusions:   PRN Meds:.  acetaminophen    albuterol    senna-docusate sodium **AND** polyethylene glycol **AND** bisacodyl **AND** bisacodyl    Calcium Replacement - Follow Nurse / BPA Driven Protocol    dextrose    dextrose    glucagon (human recombinant)    Magnesium Standard Dose Replacement - Follow Nurse / BPA Driven Protocol    Phosphorus Replacement - Follow Nurse / BPA Driven Protocol    Potassium Replacement - Follow Nurse / BPA Driven Protocol    sodium chloride    sodium chloride    sodium chloride     Please refer to the medical record for a full medication list    Review of Systems:    Constitutional-- No Fever, chills or sweats.  Appetite decreased, and some malaise. No fatigue.  HEENT-- No new vision, hearing or throat  "complaints.  No epistaxis or oral sores.  Denies odynophagia or dysphagia. No headache, photophobia or neck stiffness.  CV-- No chest pain, palpitation or syncope  Resp-- No SOB/cough/Hemoptysis  GI- No nausea, vomiting, or diarrhea.  No hematochezia, melena, or hematemesis. Denies jaundice or chronic liver disease.  -- No dysuria, hematuria, or flank pain.  Denies hesitancy, urgency or burning with urination.  Self caths.  Has some back pain prior to arrival, but better now.   Lymph- no swollen lymph nodes in neck/axilla or groin.   Heme- No active bruising or bleeding; no Hx of DVT or PE.  MS-- no swelling or pain in the bones or joints of arms/legs.  No new back pain.  Neuro-- No acute focal weakness or numbness in the arms or legs.  No seizures.  Some dizziness as per HPI.  Skin--No rashes or lesions, no nodules    Physical Exam:   Vital Signs   Temp:  [97.7 °F (36.5 °C)-97.9 °F (36.6 °C)] 97.9 °F (36.6 °C)  Heart Rate:  [] 86  Resp:  [16-18] 16  BP: ()/() 123/75    Blood pressure 123/75, pulse 86, temperature 97.9 °F (36.6 °C), temperature source Oral, resp. rate 16, height 190.5 cm (75\"), weight 112 kg (246 lb 14.6 oz), SpO2 95%.  GENERAL: Awake and alert, in no acute distress. Appears older than stated age.  Resting in chair.  HEENT:  Normocephalic, atraumatic.  Oropharynx without thrush. Dentition in fair repair. No cervical adenopathy. No neck masses.  Ears externally normal, Nose externally normal.  EYES: No conjunctival injection. No icterus. EOM full.  LYMPHATICS: No lymphadenopathy of the neck or axillary or inguinal regions.   HEART: No murmur, gallop, or pericardial friction rub. Reg rate rhythm  LUNGS: Clear to auscultation and percussion. No respiratory distress, no use of accessory muscles.  No wheezes.  ABDOMEN: Soft, nontender, nondistended. No appreciable HSM.  Bowel sounds normal.  SKIN: Warm and dry without cutaneous eruptions.  No nodules.    PSYCHIATRIC: Mental status " "lucid. No confusion.  EXT:  No cellulitic change.  NEURO: Oriented to name, nonfocal.      Results Review:   I reviewed the patient's new clinical results.  I reviewed the patient's new imaging results and agree with the interpretation.  I reviewed the patient's other test results and agree with the interpretation    Results from last 7 days   Lab Units 01/29/24  0447 01/28/24  0421 01/26/24  0413   WBC 10*3/mm3 7.85 7.52 6.94   HEMOGLOBIN g/dL 10.6* 11.0* 11.3*   HEMATOCRIT % 31.4* 33.2* 34.6*   PLATELETS 10*3/mm3 197 198 195     Results from last 7 days   Lab Units 01/29/24  0447   SODIUM mmol/L 144   POTASSIUM mmol/L 3.9   CHLORIDE mmol/L 107   CO2 mmol/L 28.0   BUN mg/dL 34*   CREATININE mg/dL 0.86   GLUCOSE mg/dL 154*   CALCIUM mg/dL 9.0     Results from last 7 days   Lab Units 01/25/24  1552   ALK PHOS U/L 240*   BILIRUBIN mg/dL 0.6   ALT (SGPT) U/L 38   AST (SGOT) U/L 41*     Results from last 7 days   Lab Units 01/25/24  1552   SED RATE mm/hr 124*     Results from last 7 days   Lab Units 01/25/24  1813   CRP mg/dL 1.18*         Results from last 7 days   Lab Units 01/27/24  0943   LACTATE mmol/L 1.2     Estimated Creatinine Clearance: 81.7 mL/min (by C-G formula based on SCr of 0.86 mg/dL).     Procalitonin Results:      Lab 01/25/24  1813   PROCALCITONIN 0.04      Brief Urine Lab Results  (Last result in the past 365 days)        Color   Clarity   Blood   Leuk Est   Nitrite   Protein   CREAT   Urine HCG        01/25/24 1913 Yellow   Cloudy   Negative   Moderate (2+)   Positive   Negative                  No results found for: \"SITE\", \"ALLENTEST\", \"PHART\", \"FPC1UEJ\", \"PO2ART\", \"OZF7ABF\", \"BASEEXCESS\", \"O9SCEGGF\", \"HGBBG\", \"HCTABG\", \"OXYHEMOGLOBI\", \"METHHGBN\", \"CARBOXYHGB\", \"CO2CT\", \"BAROMETRIC\", \"MODALITY\", \"FIO2\"     Microbiology:  Microbiology Results (last 10 days)       Procedure Component Value - Date/Time    Blood Culture - Blood, Arm, Left [[id number]]  (Normal) Collected: 01/27/24 0943    Lab Status: " Preliminary result Specimen: Blood from Arm, Left Updated: 01/28/24 1000     Blood Culture No growth at 24 hours    Blood Culture - Blood, Arm, Left [[id number]]  (Abnormal) Collected: 01/25/24 2014    Lab Status: Final result Specimen: Blood from Arm, Left Updated: 01/28/24 0639     Blood Culture Staphylococcus, coagulase negative     Isolated from Aerobic Bottle     Gram Stain Aerobic Bottle Gram positive cocci in groups    Narrative:      Probable contaminant requires clinical correlation, susceptibility not performed unless requested by physician.      Blood Culture - Blood, Arm, Right [[id number]]  (Normal) Collected: 01/25/24 2014    Lab Status: Preliminary result Specimen: Blood from Arm, Right Updated: 01/28/24 2045     Blood Culture No growth at 3 days    Blood Culture ID, PCR - Blood, Arm, Left [[id number]]  (Abnormal) Collected: 01/25/24 2014    Lab Status: Final result Specimen: Blood from Arm, Left Updated: 01/27/24 0829     BCID, PCR Staph spp, not aureus or lugdunensis. Identification by BCID2 PCR.     BOTTLE TYPE Aerobic Bottle    Urine Culture - Urine, Urine, Clean Catch [[id number]]  (Abnormal)  (Susceptibility) Collected: 01/25/24 1913    Lab Status: Final result Specimen: Urine, Clean Catch Updated: 01/27/24 0612     Urine Culture >100,000 CFU/mL Escherichia coli    Narrative:      Colonization of the urinary tract without infection is common. Treatment is discouraged unless the patient is symptomatic, pregnant, or undergoing an invasive urologic procedure.    Susceptibility        Escherichia coli      JESSICA      Amikacin Susceptible      Amoxicillin + Clavulanate Susceptible      Ampicillin Resistant      Ampicillin + Sulbactam Intermediate      Cefazolin Susceptible      Cefepime Susceptible      Ceftazidime Susceptible      Ceftriaxone Susceptible      Gentamicin Resistant      Levofloxacin Resistant      Nitrofurantoin Susceptible      Piperacillin + Tazobactam Susceptible      Tobramycin  Resistant      Trimethoprim + Sulfamethoxazole Resistant                                       Radiology:  Imaging Results (Last 72 Hours)       ** No results found for the last 72 hours. 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  E. coli, not ESBL from 1/25.  Staphylococcal species bacteremia, new.  Contaminant.  Not toxic.  Benign prostatic hypertrophy with urinary retention/self catheterizations 4 times a day.  Increases risk for recurrent UTI.  Paroxysmal atrial fibrillation/Watchman procedure/Plavix  Type 2 diabetes mellitus, increased risk for infection.  Chronic kidney disease Stage IIIa  Obesity  Essential hypertension  Obstructive sleep apnea/CPAP  Penicillin allergy (hives). Tolerated Rocephin in past.  Tolerating cefuroxime.  Anemia, chronic disease.  Slightly worse.      PLAN:  Diagnostically, continue to follow blood and urine cultures, physical examination, CBC, CMP, CRP.  Therapeutically, should continue with cefuroxime 500 mg p.o. twice daily until 2/2/24 to facilitate outpatient therapy.  Supportive care.  At risk for deconditioning.    I discussed the patient's findings and my recommendations with patient, nursing staff, and primary care team    Thank you for asking me to see Darien Camarena.  Our group would be pleased to follow this patient over the course of their hospitalization and assist with outpatient antimicrobial therapy, as indicated.  Further recommendations depend on the results of the cultures and clinical course.  Increased risk for adverse drug reactions, complications of IV access, readmission.    Dr. Og to resume care.  Follow-up should be scheduled with Dr. Og within the week.    Brandon Philippe MD  1/29/2024      Electronically signed by Brandon Philippe MD at 01/29/24 1206       Valeria Wilkins MD at 01/28/24 1234              [facility]  Hospital Medicine Services  PROGRESS NOTE    Patient Name: Darien Camarena  : 1935  MRN: [id number]    Date of Admission: 2024  Primary Care Physician: Pito Way MD    Subjective   Subjective     CC:  Dizziness    HPI:   - Patient is an 88-year-old who presented to the emergency department with complaints of dizziness and hypotension.  He was also recently treated for UTI.  Given history of A-fib cardiology was consulted and medications adjusted.  Today he is feeling better and denies current dizziness.  Blood pressure noted to still be low less than 100 systolic.  Tachycardia also noted.     -patient with positive blood culture gram-positive cocci, possibly contaminant.  Repeat blood cultures requested.  Blood pressure is improved today and patient feels less dizzy.  Infectious disease changed antibiotics to Merrem anticipated through 24.  Urine culture growing E. coli.     -persistent orthostasis today with blood pressure dropping to the 70s systolic with standing.  Also noted patient was transiently in A-fib with RVR and heart rate in the 130s.  Cardiology is following.  Infectious disease changed to oral antibiotics.      Objective   Objective     Vital Signs:   Temp:  [97.8 °F (36.6 °C)-98.2 °F (36.8 °C)] 97.8 °F (36.6 °C)  Heart Rate:  [] 82  Resp:  [17-18] 17  BP: ()/() 130/85     Physical Exam:  Constitutional: No acute distress, awake, alert  HENT: NCAT, mucous membranes moist  Respiratory: Clear to auscultation bilaterally, respiratory effort normal   Cardiovascular: Regular rate and rhythm, orthostatic blood pressures noted with systolic dropping to the 70s with standing (systolic was 126 when sitting)  Gastrointestinal: Positive bowel sounds, soft, nontender, nondistended  Musculoskeletal: No bilateral ankle edema  Psychiatric: Appropriate affect, cooperative  Neurologic: Oriented x 3, strength symmetric in all extremities, Cranial Nerves  grossly intact to confrontation, speech clear  Skin: No rashes      Results Reviewed:  LAB RESULTS:      Lab 01/28/24  0421 01/27/24  0943 01/26/24  0413 01/25/24  2329 01/25/24  2032 01/25/24  1813 01/25/24  1552   WBC 7.52  --  6.94  --   --   --  7.96   HEMOGLOBIN 11.0*  --  11.3*  --   --   --  13.0   HEMATOCRIT 33.2*  --  34.6*  --   --   --  39.4   PLATELETS 198  --  195  --   --   --  243   NEUTROS ABS 5.04  --   --   --   --   --  5.65   IMMATURE GRANS (ABS) 0.11*  --   --   --   --   --  0.05   LYMPHS ABS 1.43  --   --   --   --   --  1.46   MONOS ABS 0.57  --   --   --   --   --  0.46   EOS ABS 0.32  --   --   --   --   --  0.27   MCV 97.9*  --  96.6  --   --   --  98.0*   SED RATE  --   --   --   --   --   --  124*   CRP  --   --   --   --   --  1.18*  --    PROCALCITONIN  --   --   --   --   --  0.04  --    LACTATE  --  1.2  --  1.9 2.2*  --  2.9*         Lab 01/28/24  0421 01/27/24  0351 01/26/24 0413 01/25/24  1552   SODIUM 139 139 137 136   POTASSIUM 4.0 4.0 3.7 4.6   CHLORIDE 102 100 99 96*   CO2 28.0 31.0* 28.0 27.0   ANION GAP 9.0 8.0 10.0 13.0   BUN 37* 38* 49* 52*   CREATININE 0.99 0.95 1.14 1.21   EGFR 73.7 77.5 62.2 57.9*   GLUCOSE 155* 215* 178* 138*   CALCIUM 9.2 8.9 8.9 9.3   MAGNESIUM 1.7 1.7 1.7 1.7         Lab 01/25/24  1552   TOTAL PROTEIN 7.3   ALBUMIN 3.4*   GLOBULIN 3.9   ALT (SGPT) 38   AST (SGOT) 41*   BILIRUBIN 0.6   ALK PHOS 240*         Lab 01/25/24  1813 01/25/24  1552   PROBNP  --  1,428.0   HSTROP T 42* 42*                 Brief Urine Lab Results  (Last result in the past 365 days)        Color   Clarity   Blood   Leuk Est   Nitrite   Protein   CREAT   Urine HCG        01/25/24 1913 Yellow   Cloudy   Negative   Moderate (2+)   Positive   Negative                   Microbiology Results Abnormal       Procedure Component Value - Date/Time    Blood Culture - Blood, Arm, Left [[id number]]  (Normal) Collected: 01/27/24 0943    Lab Status: Preliminary result Specimen: Blood from  Arm, Left Updated: 01/28/24 1000     Blood Culture No growth at 24 hours    Blood Culture - Blood, Arm, Right [[id number]]  (Normal) Collected: 01/25/24 2014    Lab Status: Preliminary result Specimen: Blood from Arm, Right Updated: 01/27/24 2045     Blood Culture No growth at 2 days            Adult Transthoracic Echo Limited W/ Cont if Necessary Per Protocol    Result Date: 1/27/2024    Left ventricular systolic function is low normal. Calculated left ventricular EF = 47.8% Left ventricular ejection fraction appears to be 46 - 50%.      Results for orders placed during the hospital encounter of 01/25/24    Adult Transthoracic Echo Limited W/ Cont if Necessary Per Protocol    Interpretation Summary    Left ventricular systolic function is low normal. Calculated left ventricular EF = 47.8% Left ventricular ejection fraction appears to be 46 - 50%.      Current medications:  Scheduled Meds:allopurinol, 300 mg, Oral, Daily  aspirin, 81 mg, Oral, Daily  cefuroxime, 500 mg, Oral, Q12H  clopidogrel, 75 mg, Oral, Daily  digoxin, 250 mcg, Oral, Daily  donepezil, 10 mg, Oral, Nightly  finasteride, 5 mg, Oral, Nightly  heparin (porcine), 5,000 Units, Subcutaneous, Q8H  insulin lispro, 2-7 Units, Subcutaneous, 4x Daily AC & at Bedtime  lactobacillus acidophilus, 1 capsule, Oral, Daily  memantine, 10 mg, Oral, BID  metoprolol tartrate, 12.5 mg, Oral, BID  midodrine, 5 mg, Oral, TID AC  multivitamin with minerals, 1 tablet, Oral, Nightly  pantoprazole, 40 mg, Oral, Q AM  pravastatin, 40 mg, Oral, Nightly  senna-docusate sodium, 2 tablet, Oral, BID  sertraline, 50 mg, Oral, Daily  sodium chloride, 10 mL, Intravenous, Q12H  tamsulosin, 0.4 mg, Oral, Daily  tiotropium bromide monohydrate, 2 puff, Inhalation, Daily - RT      Continuous Infusions:   PRN Meds:.  acetaminophen    albuterol    senna-docusate sodium **AND** polyethylene glycol **AND** bisacodyl **AND** bisacodyl    Calcium Replacement - Follow Nurse / BPA Driven  Protocol    dextrose    dextrose    glucagon (human recombinant)    Magnesium Standard Dose Replacement - Follow Nurse / BPA Driven Protocol    Phosphorus Replacement - Follow Nurse / BPA Driven Protocol    Potassium Replacement - Follow Nurse / BPA Driven Protocol    sodium chloride    sodium chloride    sodium chloride    Assessment & Plan   Assessment & Plan     Active Hospital Problems    Diagnosis  POA    **Hypotension [I95.9]  Yes    Low left ventricular ejection fraction [R94.30]  Yes    Sepsis [A41.9]  Yes    Recurrent UTI [N39.0]  Yes    Presence of Watchman left atrial appendage closure device [Z95.818]  Yes    Stage 3 chronic kidney disease [N18.30]  Yes    Persistent atrial fibrillation [I48.19]  Yes    Enlarged prostate without lower urinary tract symptoms (luts) [N40.0]  Yes    Gastroesophageal reflux disease with esophagitis [K21.00]  Yes    Hyperlipidemia LDL goal <100 [E78.5]  Yes    Essential hypertension [I10]  Yes    Type 2 diabetes mellitus with stage 3 chronic kidney disease, without long-term current use of insulin [E11.22, N18.30]  Yes    Obstructive sleep apnea syndrome [G47.33]  Yes      Resolved Hospital Problems   No resolved problems to display.        Brief Hospital Course to date:  Darien Camarena is a 88 y.o. male admitted with symptomatic hypotension (dizziness) and tachycardia.  Recently treated for complicated UTI and noted history of urinary retention requiring self in and out cath.    Symptomatic orthostatic hypotension  Tachycardia  A-fib with Watchman device  Chronic systolic congestive heart failure  -Holding diuretics and antihypertensives except metoprolol at lower dose  -Added midodrine  -Cardiology consulted  -persistent orthostasis with a 48 point drop in systolic blood pressure when going from sitting to standing  -Consider fluids or increasing midodrine    CKD stage III    COPD    Enlarged prostate  Urinary retention  Recent UTI  -ID consulted for management of  antibiotics, initially treated with Merrem, now transition to oral Ceftin with plan to continue through February 2, 2024  -In and out cath every 8 and as needed  -Continue Flomax    Diabetes mellitus type 2  -Fingerstick blood sugar range 1 38-2 15  -Hemoglobin A1c was 6.20 in October 23  -Sliding scale insulin as needed    Dementia    Depression        Expected Discharge Location and Transportation: Discharge home  Expected Discharge   Expected Discharge Date: 1/29/2024; Expected Discharge Time:      DVT prophylaxis:  Medical DVT prophylaxis orders are present.         AM-PAC 6 Clicks Score (PT): 19 (01/28/24 0800)    CODE STATUS:   Code Status and Medical Interventions:   Ordered at: 01/25/24 2214     Code Status (Patient has no pulse and is not breathing):    CPR (Attempt to Resuscitate)     Medical Interventions (Patient has pulse or is breathing):    Full Support       Valeria Wilkins MD  01/28/24        Electronically signed by Valeria Wilkins MD at 01/28/24 0517       Brandon Philippe MD at 01/28/24 1059              [facility]    INFECTIOUS DISEASE PROGRESS NOTE    Darien Camarena  1935  [id number]    Date of consult: 1/26/2024    Admit date: 1/25/2024    Requesting Provider: Anthony Franklin MD  Evaluating physician: Brandon Philippe MD  Reason for Consultation: Possible recurrent UTI with h/o ESBL, E. coli on urine culture from 1/25  Chief Complaint: Symptoms similar to recent hospitalization on 1/5 with possible UTI.      Subjective   History of present illness:  Patient is a  88 y.o. male, known to Dr. Last Og, with h/o Afib/Watchman device, COPD, T2DM, CKD Stage III, STEVEN/CPAP, HTN, HLD, recurrent UTIs, ESBL, and BPH/urinary retention/self catheterization 4 times a day who was recently treated ESBL E.coli UTI with Invanz for 7 days.  He returned to [facility] ED on 1/25 for lightheadedness, fogginess, and warm sensation from sitting to standing for the past days  PTA.  He denies dysuria or hematuria. On arrival, the patient is afebrile with , BP 81/62, and remains on room air with some hypoxia.  Initial labs were , lactic acid 2.9, proBNP 1428, WBC 8000 with 71% neutrophils, creatinine 1.21, CRP 1.15, and PCT 0.04.  A UA WBC was 21-50 with moderate LE and positive nitrite and culture positive for GNR. Blood cultures are pending.  A CXR showed no acute findings.  He is currently on Rocephin.  ID was asked on 1/26 to evaluate and manage his antibiotic therapy.  He has minimal complaints in terms of his urinary tract.  He is feeling better.    1/27/2024 history reviewed.  Tolerating antibiotics with meropenem.  No high fever.  Urine culture with E. coli, not obvious ESBL, resistant to levofloxacin and sulfa as well as ampicillin.  Blood culture positive for staphylococcal species, not lugdunensis or aureus.  Patient has allergy to penicillin but has tolerated cephalosporins.    1/28/2024 history reviewed.  Tolerating cefuroxime for E. coli cystitis.  No high fevers.    Past Medical History:   Diagnosis Date    Arrhythmia     Atrial fibrillation     Bilateral leg cramps     possible new medication related side effects    Chronic kidney disease     Clotting disorder     pt doesnt know about this    COPD (chronic obstructive pulmonary disease)     Coronary artery disease     Diabetes mellitus     doesnt check sugar    E. coli sepsis 06/23/2022    Enlarged prostate without lower urinary tract symptoms (luts) 06/20/2016    Full dentures     GERD (gastroesophageal reflux disease)     Gout     Hearing aid worn     bilat prn    History of colonoscopy 09/12/2012    History of radiation therapy 02/24/2023    SBRT LLL lung    Apache (hard of hearing)     hearing aids prn    Hyperlipidemia     Hypertension     Kidney stone     surgery x1    Lung cancer     STEVEN on CPAP     compliant with machine    PAF (paroxysmal atrial fibrillation)     Prostatism     Sleep apnea     CPAP HS     Urinary incontinence     Urinary tract infection     Wears glasses     readers       Past Surgical History:   Procedure Laterality Date    ATRIAL APPENDAGE EXCLUSION LEFT WITH TRANSESOPHAGEAL ECHOCARDIOGRAM N/A 11/28/2023    Procedure: Atrial Appendage Occlusion;  Surgeon: Anthony Mcdaniel MD;  Location:  [facility];  Service: Cardiovascular;  Laterality: N/A;    CARDIAC CATHETERIZATION Left 09/29/2022    Procedure: Left Heart Cath;  Surgeon: Titus Oliveros IV, MD;  Location:  [facility];  Service: Cardiovascular;  Laterality: Left;    CARDIOVERSION      CATARACT EXTRACTION Bilateral     COLONOSCOPY      CYSTOSCOPY      ENDOSCOPY      possible    KIDNEY STONE SURGERY      x1       Pediatric History   Patient Parents    Not on file     Other Topics Concern    Not on file   Social History Narrative    Caffeine: 1 cup of decaff coffee daily        family history includes Alzheimer's disease in his mother; COPD in his father; Cancer in his father; Kidney disease in his father; Lung cancer in his father; No Known Problems in his daughter, daughter, and daughter.    Allergies   Allergen Reactions    Xarelto [Rivaroxaban] GI Bleeding     GI bleed    Penicillins Hives     Has tolerated cefepime, ceftriaxone, cefazolin, cefdinir, cefuroxime, cephalexin       Immunization History   Administered Date(s) Administered    31-influenza Vac Quardvalent Preservativ 11/01/2018, 10/16/2019    COVID-19 (PFIZER) BIVALENT 12+YRS 12/22/2022    COVID-19 (PFIZER) Purple Cap Monovalent 01/26/2021, 02/19/2021    Fluzone High Dose =>65 Years ([employer] ONLY) 10/01/2016, 09/18/2017, 09/15/2018, 10/12/2019, 09/18/2020, 09/25/2021    Fluzone High-Dose 65+yrs 09/19/2020, 09/25/2021, 12/22/2022, 10/30/2023    Hepatitis A 09/15/2018, 11/01/2018    Pneumococcal Conjugate 13-Valent (PCV13) 10/26/2015    Pneumococcal Polysaccharide (PPSV23) 06/24/2006, 09/18/2020    Pneumococcal, Unspecified 11/30/2006    Shingrix  09/25/2021, 04/15/2023    Td (TDVAX) 06/24/2005    Tdap 06/14/2018       Medication:  @Scheduled Meds:allopurinol, 300 mg, Oral, Daily  aspirin, 81 mg, Oral, Daily  cefuroxime, 500 mg, Oral, Q12H  clopidogrel, 75 mg, Oral, Daily  digoxin, 250 mcg, Oral, Daily  donepezil, 10 mg, Oral, Nightly  finasteride, 5 mg, Oral, Nightly  heparin (porcine), 5,000 Units, Subcutaneous, Q8H  insulin lispro, 2-7 Units, Subcutaneous, 4x Daily AC & at Bedtime  lactobacillus acidophilus, 1 capsule, Oral, Daily  magnesium sulfate, 4 g, Intravenous, Once  memantine, 10 mg, Oral, BID  metoprolol tartrate, 12.5 mg, Oral, BID  midodrine, 5 mg, Oral, TID AC  multivitamin with minerals, 1 tablet, Oral, Nightly  pantoprazole, 40 mg, Oral, Q AM  pravastatin, 40 mg, Oral, Nightly  senna-docusate sodium, 2 tablet, Oral, BID  sertraline, 50 mg, Oral, Daily  sodium chloride, 10 mL, Intravenous, Q12H  tamsulosin, 0.4 mg, Oral, Daily  tiotropium bromide monohydrate, 2 puff, Inhalation, Daily - RT      Continuous Infusions:   PRN Meds:.  acetaminophen    albuterol    senna-docusate sodium **AND** polyethylene glycol **AND** bisacodyl **AND** bisacodyl    Calcium Replacement - Follow Nurse / BPA Driven Protocol    dextrose    dextrose    glucagon (human recombinant)    Magnesium Standard Dose Replacement - Follow Nurse / BPA Driven Protocol    Phosphorus Replacement - Follow Nurse / BPA Driven Protocol    Potassium Replacement - Follow Nurse / BPA Driven Protocol    sodium chloride    sodium chloride    sodium chloride     Please refer to the medical record for a full medication list    Review of Systems:    Constitutional-- No Fever, chills or sweats.  Appetite decreased, and some malaise. No fatigue.  HEENT-- No new vision, hearing or throat complaints.  No epistaxis or oral sores.  Denies odynophagia or dysphagia. No headache, photophobia or neck stiffness.  CV-- No chest pain, palpitation or syncope  Resp-- No SOB/cough/Hemoptysis  GI- No nausea,  "vomiting, or diarrhea.  No hematochezia, melena, or hematemesis. Denies jaundice or chronic liver disease.  -- No dysuria, hematuria, or flank pain.  Denies hesitancy, urgency or burning with urination.  Self caths.  Has some back pain prior to arrival, but better now.   Lymph- no swollen lymph nodes in neck/axilla or groin.   Heme- No active bruising or bleeding; no Hx of DVT or PE.  MS-- no swelling or pain in the bones or joints of arms/legs.  No new back pain.  Neuro-- No acute focal weakness or numbness in the arms or legs.  No seizures.  Some dizziness as per HPI.  Skin--No rashes or lesions    Physical Exam:   Vital Signs   Temp:  [97.7 °F (36.5 °C)-98.2 °F (36.8 °C)] 97.8 °F (36.6 °C)  Heart Rate:  [] 92  Resp:  [17-18] 17  BP: ()/() 78/53    Blood pressure (!) 78/53, pulse 92, temperature 97.8 °F (36.6 °C), temperature source Oral, resp. rate 17, height 190.5 cm (75\"), weight 112 kg (246 lb 14.6 oz), SpO2 91%.  GENERAL: Awake and alert, in no acute distress. Appears older than stated age.  Resting in chair.  HEENT:  Normocephalic, atraumatic.  Oropharynx without thrush. Dentition in fair repair. No cervical adenopathy. No neck masses.  Ears externally normal, Nose externally normal.  EYES: No conjunctival injection. No icterus. EOM full.  LYMPHATICS: No lymphadenopathy of the neck or axillary or inguinal regions.   HEART: No murmur, gallop, or pericardial friction rub. Reg rate rhythm  LUNGS: Clear to auscultation and percussion. No respiratory distress, no use of accessory muscles.  ABDOMEN: Soft, nontender, nondistended. No appreciable HSM.  Bowel sounds normal.  SKIN: Warm and dry without cutaneous eruptions.  No nodules.    PSYCHIATRIC: Mental status lucid. No confusion.  EXT:  No cellulitic change.  NEURO: Oriented to name, nonfocal.      Results Review:   I reviewed the patient's new clinical results.  I reviewed the patient's new imaging results and agree with the " "interpretation.  I reviewed the patient's other test results and agree with the interpretation    Results from last 7 days   Lab Units 01/28/24  0421 01/26/24  0413 01/25/24  1552   WBC 10*3/mm3 7.52 6.94 7.96   HEMOGLOBIN g/dL 11.0* 11.3* 13.0   HEMATOCRIT % 33.2* 34.6* 39.4   PLATELETS 10*3/mm3 198 195 243     Results from last 7 days   Lab Units 01/28/24  0421   SODIUM mmol/L 139   POTASSIUM mmol/L 4.0   CHLORIDE mmol/L 102   CO2 mmol/L 28.0   BUN mg/dL 37*   CREATININE mg/dL 0.99   GLUCOSE mg/dL 155*   CALCIUM mg/dL 9.2     Results from last 7 days   Lab Units 01/25/24  1552   ALK PHOS U/L 240*   BILIRUBIN mg/dL 0.6   ALT (SGPT) U/L 38   AST (SGOT) U/L 41*     Results from last 7 days   Lab Units 01/25/24  1552   SED RATE mm/hr 124*     Results from last 7 days   Lab Units 01/25/24  1813   CRP mg/dL 1.18*         Results from last 7 days   Lab Units 01/27/24  0943   LACTATE mmol/L 1.2     Estimated Creatinine Clearance: 71 mL/min (by C-G formula based on SCr of 0.99 mg/dL).     Procalitonin Results:      Lab 01/25/24  1813   PROCALCITONIN 0.04      Brief Urine Lab Results  (Last result in the past 365 days)        Color   Clarity   Blood   Leuk Est   Nitrite   Protein   CREAT   Urine HCG        01/25/24 1913 Yellow   Cloudy   Negative   Moderate (2+)   Positive   Negative                  No results found for: \"SITE\", \"ALLENTEST\", \"PHART\", \"CGN8CSV\", \"PO2ART\", \"RXJ1EZJ\", \"BASEEXCESS\", \"J3PDCUFO\", \"HGBBG\", \"HCTABG\", \"OXYHEMOGLOBI\", \"METHHGBN\", \"CARBOXYHGB\", \"CO2CT\", \"BAROMETRIC\", \"MODALITY\", \"FIO2\"     Microbiology:  Microbiology Results (last 10 days)       Procedure Component Value - Date/Time    Blood Culture - Blood, Arm, Left [[id number]]  (Normal) Collected: 01/27/24 0943    Lab Status: Preliminary result Specimen: Blood from Arm, Left Updated: 01/28/24 1000     Blood Culture No growth at 24 hours    Blood Culture - Blood, Arm, Left [[id number]]  (Abnormal) Collected: 01/25/24 2014    Lab Status: Final result " Specimen: Blood from Arm, Left Updated: 01/28/24 0639     Blood Culture Staphylococcus, coagulase negative     Isolated from Aerobic Bottle     Gram Stain Aerobic Bottle Gram positive cocci in groups    Narrative:      Probable contaminant requires clinical correlation, susceptibility not performed unless requested by physician.      Blood Culture - Blood, Arm, Right [[id number]]  (Normal) Collected: 01/25/24 2014    Lab Status: Preliminary result Specimen: Blood from Arm, Right Updated: 01/27/24 2045     Blood Culture No growth at 2 days    Blood Culture ID, PCR - Blood, Arm, Left [[id number]]  (Abnormal) Collected: 01/25/24 2014    Lab Status: Final result Specimen: Blood from Arm, Left Updated: 01/27/24 0829     BCID, PCR Staph spp, not aureus or lugdunensis. Identification by BCID2 PCR.     BOTTLE TYPE Aerobic Bottle    Urine Culture - Urine, Urine, Clean Catch [[id number]]  (Abnormal)  (Susceptibility) Collected: 01/25/24 1913    Lab Status: Final result Specimen: Urine, Clean Catch Updated: 01/27/24 0612     Urine Culture >100,000 CFU/mL Escherichia coli    Narrative:      Colonization of the urinary tract without infection is common. Treatment is discouraged unless the patient is symptomatic, pregnant, or undergoing an invasive urologic procedure.    Susceptibility        Escherichia coli      JESSICA      Amikacin Susceptible      Amoxicillin + Clavulanate Susceptible      Ampicillin Resistant      Ampicillin + Sulbactam Intermediate      Cefazolin Susceptible      Cefepime Susceptible      Ceftazidime Susceptible      Ceftriaxone Susceptible      Gentamicin Resistant      Levofloxacin Resistant      Nitrofurantoin Susceptible      Piperacillin + Tazobactam Susceptible      Tobramycin Resistant      Trimethoprim + Sulfamethoxazole Resistant                                       Radiology:  Imaging Results (Last 72 Hours)       Procedure Component Value Units Date/Time    XR Chest 1 View [[id number]] Collected:  01/25/24 1550     Updated: 01/25/24 1555    Narrative:      XR CHEST 1 VW    Date of Exam: 1/25/2024 3:34 PM EST    Indication: Chest Pain Protocol    Comparison: Chest radiograph and chest CT 1/3/2024    Findings:  Cardiomediastinal silhouette is unchanged. Similar left hemidiaphragm with left basilar scarring/subsegmental atelectasis. No focal consolidation or overt pulmonary edema. No pleural effusion or pneumothorax. Osseous structures are unchanged.      Impression:      Impression:  No evidence of acute cardiopulmonary disease.       Electronically Signed: Surya Lion MD    1/25/2024 3:52 PM EST    Workstation ID: [id number]            IMPRESSION:   Recurrent GNR acute bacterial cystitis with h/o ESBL, not surprising giving the recurrent self cath for urinary retention.  E. coli, not ESBL from 1/25.  Staphylococcal species bacteremia, new.  Contaminant.  Not toxic.  Benign prostatic hypertrophy with urinary retention/self catheterizations 4 times a day.  Increases risk for recurrent UTI.  Paroxysmal atrial fibrillation/Watchman procedure/Plavix  Type 2 diabetes mellitus, increased risk for infection.  Chronic kidney disease Stage IIIa  Obesity  Essential hypertension  Obstructive sleep apnea/CPAP  Penicillin allergy (hives). Tolerated Rocephin in past.   Anemia, chronic disease.  Minimally worse.      PLAN:  Diagnostically, continue to follow blood and urine cultures, physical examination, imaging, CBC, CMP, CRP.  Therapeutically, continue cefuroxime 500 mg p.o. twice daily until 2/2/24 to facilitate outpatient therapy.  Supportive care.  At risk for deconditioning.    I discussed the patient's findings and my recommendations with patient, nursing staff, and primary care team    Thank you for asking me to see Darien Camarena.  Our group would be pleased to follow this patient over the course of their hospitalization and assist with outpatient antimicrobial therapy, as indicated.  Further recommendations  "depend on the results of the cultures and clinical course.  Increased risk for adverse drug reactions, complications of IV access, readmission.    See next on Monday, call sooner if needed.  Follow-up should be scheduled with Dr. Og within the week.    Brandon Philippe MD  1/28/2024      Electronically signed by Brandon Philippe MD at 01/28/24 1423       Naomy Domingo PA-C at 01/28/24 0745       Attestation signed by Mckayla Carpenter MD at 01/28/24 1614    I have reviewed this documentation and agree.                     [facility]  INPATIENT PROGRESS NOTE    Date of Admission: 1/25/2024  Date of Service: 01/28/24    Identification: Darien Camarena is a 88 y.o. male   Primary Care Physician: Pito Way MD    Chief Complaint: A-fib/hypotension  Problem List:   Hypotension    Type 2 diabetes mellitus with stage 3 chronic kidney disease, without long-term current use of insulin    Enlarged prostate without lower urinary tract symptoms (luts)    Gastroesophageal reflux disease with esophagitis    Hyperlipidemia LDL goal <100    Essential hypertension    Obstructive sleep apnea syndrome    Persistent atrial fibrillation    Stage 3 chronic kidney disease    Presence of Watchman left atrial appendage closure device    Recurrent UTI    Sepsis    Low left ventricular ejection fraction      Subjective    Subjective/Interval History    Patient lying in bed.  He is in A-fib with RVR this morning but asymptomatic with his heart rate going into the 130s briefly.       Objective    Objective   Vitals:  /87 (BP Location: Right arm, Patient Position: Lying)   Pulse 92   Temp 98.2 °F (36.8 °C) (Oral)   Resp 18   Ht 190.5 cm (75\")   Wt 112 kg (246 lb 14.6 oz)   SpO2 91%   BMI 30.86 kg/m²     Intake/Output Summary (Last 24 hours) at 1/28/2024 0746  Last data filed at 1/27/2024 2200  Gross per 24 hour   Intake --   Output 1600 ml   Net -1600 ml       Current " Medications:  allopurinol, 300 mg, Oral, Daily  aspirin, 81 mg, Oral, Daily  cefuroxime, 500 mg, Oral, Q12H  clopidogrel, 75 mg, Oral, Daily  digoxin, 250 mcg, Oral, Daily  donepezil, 10 mg, Oral, Nightly  finasteride, 5 mg, Oral, Nightly  heparin (porcine), 5,000 Units, Subcutaneous, Q8H  insulin lispro, 2-7 Units, Subcutaneous, 4x Daily AC & at Bedtime  lactobacillus acidophilus, 1 capsule, Oral, Daily  memantine, 10 mg, Oral, BID  metoprolol tartrate, 12.5 mg, Oral, BID  midodrine, 5 mg, Oral, TID AC  multivitamin with minerals, 1 tablet, Oral, Nightly  pantoprazole, 40 mg, Oral, Q AM  pravastatin, 40 mg, Oral, Nightly  senna-docusate sodium, 2 tablet, Oral, BID  sertraline, 50 mg, Oral, Daily  sodium chloride, 10 mL, Intravenous, Q12H  tamsulosin, 0.4 mg, Oral, Daily  tiotropium bromide monohydrate, 2 puff, Inhalation, Daily - RT         Constitutional:       Appearance: Not in distress.   Neck:      Vascular: JVD normal.   Pulmonary:      Effort: Pulmonary effort is normal.   Cardiovascular:      Tachycardia present. Irregularly irregular rhythm.   Edema:     Peripheral edema absent.   Neurological:      General: No focal deficit present.          Data Review:  Results from last 7 days   Lab Units 01/28/24  0421 01/26/24  0413 01/25/24  1552   WBC 10*3/mm3 7.52 6.94 7.96   HEMOGLOBIN g/dL 11.0* 11.3* 13.0   HEMATOCRIT % 33.2* 34.6* 39.4   PLATELETS 10*3/mm3 198 195 243     Results from last 7 days   Lab Units 01/28/24  0421 01/27/24  0351 01/26/24  0413   SODIUM mmol/L 139 139 137   POTASSIUM mmol/L 4.0 4.0 3.7   CHLORIDE mmol/L 102 100 99   CO2 mmol/L 28.0 31.0* 28.0   BUN mg/dL 37* 38* 49*   CREATININE mg/dL 0.99 0.95 1.14   GLUCOSE mg/dL 155* 215* 178*              Results from last 7 days   Lab Units 01/26/24  1159   T3 FREE pg/mL 2.62             Results from last 7 days   Lab Units 01/25/24  1813 01/25/24  1552   HSTROP T ng/L 42* 42*     Results from last 7 days   Lab Units 01/25/24  1552   PROBNP pg/mL  1,428.0       No radiology results for the last day    Results for orders placed during the hospital encounter of 01/25/24    Adult Transthoracic Echo Limited W/ Cont if Necessary Per Protocol    Interpretation Summary    Left ventricular systolic function is low normal. Calculated left ventricular EF = 47.8% Left ventricular ejection fraction appears to be 46 - 50%.      RESULTS REVIEW:    I reviewed the patient's new clinical results.    I personally reviewed the patient's EKG/Telemetry data         Assessment    Assessment:   Persistent atrial fibrillation  S/p Watchman implantation 11/29/2023 due to GI bleed and hematuria  Started on digoxin 250 mcg  No need for DOAC secondary to watchman implantation.  Hypotension  On midodrine 5 mg 3 times daily  Newly diagnosed HFrEF, possibly tachycardic induced, remains euvolemic  Repeat limited echo shows mildly reduced EF of 46-50% on 1/25/2024.  Hyperlipidemia  UTI/urinary retention, ID following         Plan    Plan:   Continue digoxin 250 mcg daily and check digoxin level in the morning.  Continue metoprolol to tartrate 12.5 mg twice daily as blood pressure allows for rate control.  Continue aspirin 81 mg and Plavix after Watchman device implantation.  Continue to hold diuretics and valsartan given patient's hypotension.  UTI being managed by ID, now on oral antibiotics.  I think it is reasonable to monitor patient today with his hypotension and tachycardia and reevaluate tomorrow by Dr. Oliveros/Ami Ansari.          Electronically signed by Naomy Domingo PA-C, 01/28/24, 10:07 AM EST.     Electronically signed by Mckayla Carpenter MD at 01/28/24 3746       Mckayla Carpenter MD at 01/27/24 4209          [facility] at   IP Progress Note    PROBLEM LIST:  History of atrial fibrillation/watchman  Chronic kidney disease  COPD  Coronary artery disease  UTI      HOSPITAL COURSE:  Patient is being admitted for recurrent UTIs      CHIEF  "COMPLAINTS:  Chest pain shortness of breath      Subjective   Feeling much better      Objective     Blood pressure 131/90, pulse 96, temperature 97.6 °F (36.4 °C), temperature source Oral, resp. rate 17, height 190.5 cm (75\"), weight 112 kg (248 lb), SpO2 93%.     Intake/Output Summary (Last 24 hours) at 1/27/2024 1156  Last data filed at 1/27/2024 0500  Gross per 24 hour   Intake --   Output 2650 ml   Net -2650 ml       PHYSICAL EXAM:  Constitutional:       General: Awake and alert     Appearance: In no acute distress    Neck:     JVP: Not elevated     Carotid artery: No carotid bruit    Pulmonary:      Effort: Pulmonary effort is normal.      Breath sounds: Decreased breath sounds    Cardiovascular:      Normal rate. Regular rhythm. Normal S1. Normal S2.      Murmurs: There is no significant murmur.      No gallop. No click. No rub.     Abdominal:      General: Bowel sounds are normal.      Palpations: Abdomen is soft.      Tenderness: There is no abdominal tenderness.    Extremities:     Pulses: Good distal pulses     Edema: Trace edema        MEDICATIONS:    allopurinol, 300 mg, Oral, Daily  aspirin, 81 mg, Oral, Daily  cefuroxime, 500 mg, Oral, Q12H  clopidogrel, 75 mg, Oral, Daily  digoxin, 250 mcg, Oral, Daily  donepezil, 10 mg, Oral, Nightly  finasteride, 5 mg, Oral, Nightly  heparin (porcine), 5,000 Units, Subcutaneous, Q8H  insulin lispro, 2-7 Units, Subcutaneous, 4x Daily AC & at Bedtime  lactobacillus acidophilus, 1 capsule, Oral, Daily  memantine, 10 mg, Oral, BID  metoprolol tartrate, 12.5 mg, Oral, BID  midodrine, 5 mg, Oral, TID AC  multivitamin with minerals, 1 tablet, Oral, Nightly  pantoprazole, 40 mg, Oral, Q AM  pravastatin, 40 mg, Oral, Nightly  senna-docusate sodium, 2 tablet, Oral, BID  sertraline, 50 mg, Oral, Daily  sodium chloride, 10 mL, Intravenous, Q12H  tamsulosin, 0.4 mg, Oral, Daily  tiotropium bromide monohydrate, 2 puff, Inhalation, Daily - RT          Results from last 7 days " "  Lab Units 01/26/24  0413   WBC 10*3/mm3 6.94   HEMOGLOBIN g/dL 11.3*   HEMATOCRIT % 34.6*   PLATELETS 10*3/mm3 195     Results from last 7 days   Lab Units 01/27/24  0351 01/26/24  0413 01/25/24  1552   SODIUM mmol/L 139   < > 136   POTASSIUM mmol/L 4.0   < > 4.6   CHLORIDE mmol/L 100   < > 96*   CO2 mmol/L 31.0*   < > 27.0   BUN mg/dL 38*   < > 52*   CREATININE mg/dL 0.95   < > 1.21   CALCIUM mg/dL 8.9   < > 9.3   BILIRUBIN mg/dL  --   --  0.6   ALK PHOS U/L  --   --  240*   ALT (SGPT) U/L  --   --  38   AST (SGOT) U/L  --   --  41*   GLUCOSE mg/dL 215*   < > 138*    < > = values in this interval not displayed.         Lab Results   Component Value Date    TROPONINT 42 (H) 01/25/2024     Results from last 7 days   Lab Units 01/26/24  1159   T3 FREE pg/mL 2.62             No results found for: \"IRON\", \"FERRITIN\", \"LABIRON\", \"TIBC\"   Hemoglobin A1C   Date Value Ref Range Status   10/30/2023 6.20 (H) 4.80 - 5.60 % Final     Magnesium   Date Value Ref Range Status   01/27/2024 1.7 1.6 - 2.4 mg/dL Final        RESULT REVIEW:    I reviewed the patient's new clinical results.    Tele: Atrial Fib with Controlled Rate      ASSESSMENT:     Atrial fibrillation  Hypertension  Urosepsis    PLAN:     Patient is being worked up for possible treatment for urosepsis  Patient is responding good to the treatment.  Will continue monitoring him in the hospital.    Electronically signed by Mckayla Carpenter MD at 01/27/24 1547       Brandon Philippe MD at 01/27/24 1146              [facility]    INFECTIOUS DISEASE PROGRESS NOTE    Darien Camarena  1935  [id number]    Date of consult: 1/26/2024    Admit date: 1/25/2024    Requesting Provider: Anthony Franklin MD  Evaluating physician: Brandon Philippe MD  Reason for Consultation: Possible recurrent UTI with h/o ESBL, E. coli on urine culture from 1/25  Chief Complaint: Symptoms similar to recent hospitalization on 1/5 with possible " UTI.      Subjective   History of present illness:  Patient is a  88 y.o. male, known to Dr. Last Og, with h/o Afib/Watchman device, COPD, T2DM, CKD Stage III, STEVEN/CPAP, HTN, HLD, recurrent UTIs, ESBL, and BPH/urinary retention/self catheterization 4 times a day who was recently treated ESBL E.coli UTI with Invanz for 7 days.  He returned to [facility] ED on 1/25 for lightheadedness, fogginess, and warm sensation from sitting to standing for the past days PTA.  He denies dysuria or hematuria. On arrival, the patient is afebrile with , BP 81/62, and remains on room air with some hypoxia.  Initial labs were , lactic acid 2.9, proBNP 1428, WBC 8000 with 71% neutrophils, creatinine 1.21, CRP 1.15, and PCT 0.04.  A UA WBC was 21-50 with moderate LE and positive nitrite and culture positive for GNR. Blood cultures are pending.  A CXR showed no acute findings.  He is currently on Rocephin.  ID was asked on 1/26 to evaluate and manage his antibiotic therapy.  He has minimal complaints in terms of his urinary tract.  He is feeling better.    1/27/2024 history reviewed.  Tolerating antibiotics with meropenem.  No high fever.  Urine culture with E. coli, not obvious ESBL, resistant to levofloxacin and sulfa as well as ampicillin.  Blood culture positive for staphylococcal species, not lugdunensis or aureus.  Patient has allergy to penicillin but has tolerated cephalosporins.    Past Medical History:   Diagnosis Date    Arrhythmia     Atrial fibrillation     Bilateral leg cramps     possible new medication related side effects    Chronic kidney disease     Clotting disorder     pt doesnt know about this    COPD (chronic obstructive pulmonary disease)     Coronary artery disease     Diabetes mellitus     doesnt check sugar    E. coli sepsis 06/23/2022    Enlarged prostate without lower urinary tract symptoms (luts) 06/20/2016    Full dentures     GERD (gastroesophageal reflux disease)     Gout     Hearing aid worn      bilat prn    History of colonoscopy 09/12/2012    History of radiation therapy 02/24/2023    SBRT LLL lung    [place] (hard of hearing)     hearing aids prn    Hyperlipidemia     Hypertension     Kidney stone     surgery x1    Lung cancer     STEVEN on CPAP     compliant with machine    PAF (paroxysmal atrial fibrillation)     Prostatism     Sleep apnea     CPAP HS    Urinary incontinence     Urinary tract infection     Wears glasses     readers       Past Surgical History:   Procedure Laterality Date    ATRIAL APPENDAGE EXCLUSION LEFT WITH TRANSESOPHAGEAL ECHOCARDIOGRAM N/A 11/28/2023    Procedure: Atrial Appendage Occlusion;  Surgeon: Anthony Mcdaniel MD;  Location:  [facility];  Service: Cardiovascular;  Laterality: N/A;    CARDIAC CATHETERIZATION Left 09/29/2022    Procedure: Left Heart Cath;  Surgeon: Titus Oliveros IV, MD;  Location:  [facility];  Service: Cardiovascular;  Laterality: Left;    CARDIOVERSION      CATARACT EXTRACTION Bilateral     COLONOSCOPY      CYSTOSCOPY      ENDOSCOPY      possible    KIDNEY STONE SURGERY      x1       Pediatric History   Patient Parents    Not on file     Other Topics Concern    Not on file   Social History Narrative    Caffeine: 1 cup of decaff coffee daily        family history includes Alzheimer's disease in his mother; COPD in his father; Cancer in his father; Kidney disease in his father; Lung cancer in his father; No Known Problems in his daughter, daughter, and daughter.    Allergies   Allergen Reactions    Xarelto [Rivaroxaban] GI Bleeding     GI bleed    Penicillins Hives     Has tolerated cefepime, ceftriaxone, cefazolin, cefdinir, cefuroxime, cephalexin       Immunization History   Administered Date(s) Administered    31-influenza Vac Quardvalent Preservativ 11/01/2018, 10/16/2019    COVID-19 (PFIZER) BIVALENT 12+YRS 12/22/2022    COVID-19 (PFIZER) Purple Cap Monovalent 01/26/2021, 02/19/2021    Fluzone High Dose =>65 Years  ([facility] ONLY) 10/01/2016, 09/18/2017, 09/15/2018, 10/12/2019, 09/18/2020, 09/25/2021    Fluzone High-Dose 65+yrs 09/19/2020, 09/25/2021, 12/22/2022, 10/30/2023    Hepatitis A 09/15/2018, 11/01/2018    Pneumococcal Conjugate 13-Valent (PCV13) 10/26/2015    Pneumococcal Polysaccharide (PPSV23) 06/24/2006, 09/18/2020    Pneumococcal, Unspecified 11/30/2006    Shingrix 09/25/2021, 04/15/2023    Td (TDVAX) 06/24/2005    Tdap 06/14/2018       Medication:  @Scheduled Meds:allopurinol, 300 mg, Oral, Daily  aspirin, 81 mg, Oral, Daily  clopidogrel, 75 mg, Oral, Daily  digoxin, 250 mcg, Oral, Daily  donepezil, 10 mg, Oral, Nightly  finasteride, 5 mg, Oral, Nightly  heparin (porcine), 5,000 Units, Subcutaneous, Q8H  insulin lispro, 2-7 Units, Subcutaneous, 4x Daily AC & at Bedtime  lactobacillus acidophilus, 1 capsule, Oral, Daily  memantine, 10 mg, Oral, BID  meropenem, 1,000 mg, Intravenous, Q8H  metoprolol tartrate, 12.5 mg, Oral, BID  midodrine, 5 mg, Oral, TID AC  multivitamin with minerals, 1 tablet, Oral, Nightly  pantoprazole, 40 mg, Oral, Q AM  pravastatin, 40 mg, Oral, Nightly  senna-docusate sodium, 2 tablet, Oral, BID  sertraline, 50 mg, Oral, Daily  sodium chloride, 10 mL, Intravenous, Q12H  tamsulosin, 0.4 mg, Oral, Daily  tiotropium bromide monohydrate, 2 puff, Inhalation, Daily - RT      Continuous Infusions:   PRN Meds:.  acetaminophen    albuterol    senna-docusate sodium **AND** polyethylene glycol **AND** bisacodyl **AND** bisacodyl    Calcium Replacement - Follow Nurse / BPA Driven Protocol    dextrose    dextrose    glucagon (human recombinant)    Magnesium Standard Dose Replacement - Follow Nurse / BPA Driven Protocol    Phosphorus Replacement - Follow Nurse / BPA Driven Protocol    Potassium Replacement - Follow Nurse / BPA Driven Protocol    sodium chloride    sodium chloride    sodium chloride     Please refer to the medical record for a full medication list    Review of Systems:    Constitutional--  "No Fever, chills or sweats.  Appetite decreased, and some malaise. No fatigue.  HEENT-- No new vision, hearing or throat complaints.  No epistaxis or oral sores.  Denies odynophagia or dysphagia. No headache, photophobia or neck stiffness.  CV-- No chest pain, palpitation or syncope  Resp-- No SOB/cough/Hemoptysis  GI- No nausea, vomiting, or diarrhea.  No hematochezia, melena, or hematemesis. Denies jaundice or chronic liver disease.  -- No dysuria, hematuria, or flank pain.  Denies hesitancy, urgency or burning with urination.  Self caths.  Has some back pain prior to arrival, but better now.   Lymph- no swollen lymph nodes in neck/axilla or groin.   Heme- No active bruising or bleeding; no Hx of DVT or PE.  MS-- no swelling or pain in the bones or joints of arms/legs.  No new back pain.  Neuro-- No acute focal weakness or numbness in the arms or legs.  No seizures.  Some dizziness as per HPI.  Skin--No rashes or lesions, no nodules    Physical Exam:   Vital Signs   Temp:  [97.6 °F (36.4 °C)-98.3 °F (36.8 °C)] 97.6 °F (36.4 °C)  Heart Rate:  [] 96  Resp:  [17-18] 17  BP: (110-131)/(71-90) 131/90    Blood pressure 131/90, pulse 96, temperature 97.6 °F (36.4 °C), temperature source Oral, resp. rate 17, height 190.5 cm (75\"), weight 112 kg (248 lb), SpO2 93%.  GENERAL: Awake and alert, in minimal distress. Appears older than stated age.  Resting in chair.  HEENT:  Normocephalic, atraumatic.  Oropharynx without thrush. Dentition in fair repair. No cervical adenopathy. No neck masses.  Ears externally normal, Nose externally normal.  EYES: No conjunctival injection. No icterus. EOM full.  LYMPHATICS: No lymphadenopathy of the neck or axillary or inguinal regions.   HEART: No murmur, gallop, or pericardial friction rub. Reg rate rhythm, No JVD at 45 degrees.  LUNGS: Clear to auscultation and percussion. No respiratory distress, no use of accessory muscles.  ABDOMEN: Soft, nontender, nondistended. No appreciable " "HSM.  Bowel sounds normal.  SKIN: Warm and dry without cutaneous eruptions.  No nodules.    PSYCHIATRIC: Mental status lucid. No confusion.  EXT:  No cellulitic change.  NEURO: Oriented to name, nonfocal.      Results Review:   I reviewed the patient's new clinical results.  I reviewed the patient's new imaging results and agree with the interpretation.  I reviewed the patient's other test results and agree with the interpretation    Results from last 7 days   Lab Units 01/26/24  0413 01/25/24  1552   WBC 10*3/mm3 6.94 7.96   HEMOGLOBIN g/dL 11.3* 13.0   HEMATOCRIT % 34.6* 39.4   PLATELETS 10*3/mm3 195 243     Results from last 7 days   Lab Units 01/27/24  0351   SODIUM mmol/L 139   POTASSIUM mmol/L 4.0   CHLORIDE mmol/L 100   CO2 mmol/L 31.0*   BUN mg/dL 38*   CREATININE mg/dL 0.95   GLUCOSE mg/dL 215*   CALCIUM mg/dL 8.9     Results from last 7 days   Lab Units 01/25/24  1552   ALK PHOS U/L 240*   BILIRUBIN mg/dL 0.6   ALT (SGPT) U/L 38   AST (SGOT) U/L 41*     Results from last 7 days   Lab Units 01/25/24  1552   SED RATE mm/hr 124*     Results from last 7 days   Lab Units 01/25/24  1813   CRP mg/dL 1.18*         Results from last 7 days   Lab Units 01/27/24  0943   LACTATE mmol/L 1.2     Estimated Creatinine Clearance: 74 mL/min (by C-G formula based on SCr of 0.95 mg/dL).     Procalitonin Results:      Lab 01/25/24  1813   PROCALCITONIN 0.04      Brief Urine Lab Results  (Last result in the past 365 days)        Color   Clarity   Blood   Leuk Est   Nitrite   Protein   CREAT   Urine HCG        01/25/24 1913 Yellow   Cloudy   Negative   Moderate (2+)   Positive   Negative                  No results found for: \"SITE\", \"ALLENTEST\", \"PHART\", \"HEL7NKK\", \"PO2ART\", \"GQY9CLJ\", \"BASEEXCESS\", \"M9XZXYEX\", \"HGBBG\", \"HCTABG\", \"OXYHEMOGLOBI\", \"METHHGBN\", \"CARBOXYHGB\", \"CO2CT\", \"BAROMETRIC\", \"MODALITY\", \"FIO2\"     Microbiology:  Microbiology Results (last 10 days)       Procedure Component Value - Date/Time    Blood Culture - " Blood, Arm, Left [[id number]]  (Abnormal) Collected: 01/25/24 2014    Lab Status: Preliminary result Specimen: Blood from Arm, Left Updated: 01/27/24 0803     Blood Culture Abnormal Stain     Gram Stain Aerobic Bottle Gram positive cocci in groups    Blood Culture - Blood, Arm, Right [[id number]]  (Normal) Collected: 01/25/24 2014    Lab Status: Preliminary result Specimen: Blood from Arm, Right Updated: 01/26/24 2045     Blood Culture No growth at 24 hours    Blood Culture ID, PCR - Blood, Arm, Left [[id number]]  (Abnormal) Collected: 01/25/24 2014    Lab Status: Final result Specimen: Blood from Arm, Left Updated: 01/27/24 0829     BCID, PCR Staph spp, not aureus or lugdunensis. Identification by BCID2 PCR.     BOTTLE TYPE Aerobic Bottle    Urine Culture - Urine, Urine, Clean Catch [[id number]]  (Abnormal)  (Susceptibility) Collected: 01/25/24 1913    Lab Status: Final result Specimen: Urine, Clean Catch Updated: 01/27/24 0612     Urine Culture >100,000 CFU/mL Escherichia coli    Narrative:      Colonization of the urinary tract without infection is common. Treatment is discouraged unless the patient is symptomatic, pregnant, or undergoing an invasive urologic procedure.    Susceptibility        Escherichia coli      JESSICA      Amikacin Susceptible      Amoxicillin + Clavulanate Susceptible      Ampicillin Resistant      Ampicillin + Sulbactam Intermediate      Cefazolin Susceptible      Cefepime Susceptible      Ceftazidime Susceptible      Ceftriaxone Susceptible      Gentamicin Resistant      Levofloxacin Resistant      Nitrofurantoin Susceptible      Piperacillin + Tazobactam Susceptible      Tobramycin Resistant      Trimethoprim + Sulfamethoxazole Resistant                                       Radiology:  Imaging Results (Last 72 Hours)       Procedure Component Value Units Date/Time    XR Chest 1 View [[id number]] Collected: 01/25/24 1550     Updated: 01/25/24 1555    Narrative:      XR CHEST 1 VW    Date of  Exam: 1/25/2024 3:34 PM EST    Indication: Chest Pain Protocol    Comparison: Chest radiograph and chest CT 1/3/2024    Findings:  Cardiomediastinal silhouette is unchanged. Similar left hemidiaphragm with left basilar scarring/subsegmental atelectasis. No focal consolidation or overt pulmonary edema. No pleural effusion or pneumothorax. Osseous structures are unchanged.      Impression:      Impression:  No evidence of acute cardiopulmonary disease.       Electronically Signed: Surya Lion MD    1/25/2024 3:52 PM EST    Workstation ID: [id number]            IMPRESSION:   Recurrent GNR acute bacterial cystitis with h/o ESBL, not surprising giving the recurrent self cath for urinary retention.  E. coli, not ESBL from 1/25.  Staphylococcal species bacteremia, new.  Contaminant.  Benign prostatic hypertrophy with urinary retention/self catheterizations 4 times a day.  Increases risk for recurrent UTI.  Paroxysmal atrial fibrillation/Watchman procedure/Plavix  Type 2 diabetes mellitus  Chronic kidney disease Stage IIIa  Obesity  Essential hypertension  Obstructive sleep apnea/CPAP  Penicillin allergy (hives). Tolerated Rocephin in past.   Anemia, chronic disease.  Slightly worse.      PLAN:  Diagnostically, continue to follow blood and urine cultures, physical examination, imaging, CBC, CMP, ESR, CRP, procalcitonin, lactic acid.  Therapeutically, change to cefuroxime 500 mg p.o. twice daily until 2/2/24 to facilitate outpatient therapy.  Supportive care.  At risk for deconditioning.    I discussed the patient's findings and my recommendations with patient, nursing staff, and primary care team    Thank you for asking me to see Darien Camarena.  Our group would be pleased to follow this patient over the course of their hospitalization and assist with outpatient antimicrobial therapy, as indicated.  Further recommendations depend on the results of the cultures and clinical course.  Increased risk for adverse drug  reactions, complications of IV access, readmission.    See next on Monday, call sooner if needed.  Follow-up should be scheduled with Dr. Og within the week.    Brandon Philippe MD  2024      Electronically signed by Brandon Philippe MD at 24 [phone]       Valeria Wilkins MD at 24 [phone]              [facility]  PROGRESS NOTE    Patient Name: Darien Camarena  : 1935  MRN: [id number]    Date of Admission: 2024  Primary Care Physician: Pito Way MD    Subjective   Subjective     CC:  Dizziness    HPI:   - Patient is an 88-year-old who presented to the emergency department with complaints of dizziness and hypotension.  He was also recently treated for UTI.  Given history of A-fib cardiology was consulted and medications adjusted.  Today he is feeling better and denies current dizziness.  Blood pressure noted to still be low less than 100 systolic.  Tachycardia also noted.     -patient with positive blood culture gram-positive cocci, possibly contaminant.  Repeat blood cultures requested.  Blood pressure is improved today and patient feels less dizzy.  Infectious disease changed antibiotics to Merrem anticipated through 24.  Urine culture growing E. coli.      Objective   Objective     Vital Signs:   Temp:  [97.6 °F (36.4 °C)-98.3 °F (36.8 °C)] 97.6 °F (36.4 °C)  Heart Rate:  [101-125] 101  Resp:  [18] 18  BP: ()/(62-90) 131/90     Physical Exam:  Constitutional: No acute distress, awake, alert  HENT: NCAT, mucous membranes moist  Respiratory: Clear to auscultation bilaterally, respiratory effort normal   Cardiovascular: Regular rate and rhythm  Gastrointestinal: Positive bowel sounds, soft, nontender, nondistended  Musculoskeletal: No bilateral ankle edema  Psychiatric: Appropriate affect, cooperative  Neurologic: Oriented x 3, strength symmetric in all extremities, Cranial Nerves grossly intact to  confrontation, speech clear  Skin: No rashes      Results Reviewed:  LAB RESULTS:      Lab 01/26/24  0413 01/25/24 2329 01/25/24 2032 01/25/24  1813 01/25/24  1552   WBC 6.94  --   --   --  7.96   HEMOGLOBIN 11.3*  --   --   --  13.0   HEMATOCRIT 34.6*  --   --   --  39.4   PLATELETS 195  --   --   --  243   NEUTROS ABS  --   --   --   --  5.65   IMMATURE GRANS (ABS)  --   --   --   --  0.05   LYMPHS ABS  --   --   --   --  1.46   MONOS ABS  --   --   --   --  0.46   EOS ABS  --   --   --   --  0.27   MCV 96.6  --   --   --  98.0*   SED RATE  --   --   --   --  124*   CRP  --   --   --  1.18*  --    PROCALCITONIN  --   --   --  0.04  --    LACTATE  --  1.9 2.2*  --  2.9*         Lab 01/27/24  0351 01/26/24  0413 01/25/24  1552   SODIUM 139 137 136   POTASSIUM 4.0 3.7 4.6   CHLORIDE 100 99 96*   CO2 31.0* 28.0 27.0   ANION GAP 8.0 10.0 13.0   BUN 38* 49* 52*   CREATININE 0.95 1.14 1.21   EGFR 77.5 62.2 57.9*   GLUCOSE 215* 178* 138*   CALCIUM 8.9 8.9 9.3   MAGNESIUM 1.7 1.7 1.7         Lab 01/25/24  1552   TOTAL PROTEIN 7.3   ALBUMIN 3.4*   GLOBULIN 3.9   ALT (SGPT) 38   AST (SGOT) 41*   BILIRUBIN 0.6   ALK PHOS 240*         Lab 01/25/24 1813 01/25/24  1552   PROBNP  --  1,428.0   HSTROP T 42* 42*                 Brief Urine Lab Results  (Last result in the past 365 days)        Color   Clarity   Blood   Leuk Est   Nitrite   Protein   CREAT   Urine HCG        01/25/24 1913 Yellow   Cloudy   Negative   Moderate (2+)   Positive   Negative                   Microbiology Results Abnormal       Procedure Component Value - Date/Time    Blood Culture - Blood, Arm, Right [[id number]]  (Normal) Collected: 01/25/24 2014    Lab Status: Preliminary result Specimen: Blood from Arm, Right Updated: 01/26/24 2045     Blood Culture No growth at 24 hours            XR Chest 1 View    Result Date: 1/25/2024  XR CHEST 1 VW Date of Exam: 1/25/2024 3:34 PM EST Indication: Chest Pain Protocol Comparison: Chest radiograph and chest CT  1/3/2024 Findings: Cardiomediastinal silhouette is unchanged. Similar left hemidiaphragm with left basilar scarring/subsegmental atelectasis. No focal consolidation or overt pulmonary edema. No pleural effusion or pneumothorax. Osseous structures are unchanged.     Impression: Impression: No evidence of acute cardiopulmonary disease. Electronically Signed: Surya Lion MD  1/25/2024 3:52 PM EST  Workstation ID: [id number]     Results for orders placed during the hospital encounter of 01/12/24    Adult Transesophageal Echo 3D (FARHAD) W/ Cont If Necessary Per Protocol    Interpretation Summary    Left ventricular systolic function is mildly decreased. Estimated left ventricular EF = 40%    The left ventricular cavity is mildly dilated.    The left atrial cavity is dilated.    There is a 27mm Watchman device which appears well seated and well compressed. Images obtained with difficulty achieving good resolution of the borders but no periprosthetic flow was seen. No device related thrombus seen.    No aortic valve regurgitation or stenosis is present. There is mild thickening of the aortic valve. The aortic valve appears trileaflet.    There is mild, bileaflet mitral valve thickening present. Mild mitral valve regurgitation is present with a centrally-directed jet noted. No significant mitral valve stenosis is present.    The tricuspid valve is normal in structure. Trace tricuspid valve regurgitation is present.    Borderline dilation of the ascending aorta is present. Ascending aorta = 3.6 cm No dilation of the descending aorta present. There is moderate plaque in the descending aorta present.      Current medications:  Scheduled Meds:allopurinol, 300 mg, Oral, Daily  aspirin, 81 mg, Oral, Daily  clopidogrel, 75 mg, Oral, Daily  digoxin, 250 mcg, Oral, Daily  donepezil, 10 mg, Oral, Nightly  finasteride, 5 mg, Oral, Nightly  heparin (porcine), 5,000 Units, Subcutaneous, Q8H  insulin lispro, 2-7 Units, Subcutaneous, 4x  Daily AC & at Bedtime  lactobacillus acidophilus, 1 capsule, Oral, Daily  memantine, 10 mg, Oral, BID  meropenem, 1,000 mg, Intravenous, Q8H  metoprolol tartrate, 12.5 mg, Oral, BID  midodrine, 5 mg, Oral, TID AC  multivitamin with minerals, 1 tablet, Oral, Nightly  pantoprazole, 40 mg, Oral, Q AM  pravastatin, 40 mg, Oral, Nightly  senna-docusate sodium, 2 tablet, Oral, BID  sertraline, 50 mg, Oral, Daily  sodium chloride, 10 mL, Intravenous, Q12H  tamsulosin, 0.4 mg, Oral, Daily  tiotropium bromide monohydrate, 2 puff, Inhalation, Daily - RT      Continuous Infusions:   PRN Meds:.  acetaminophen    albuterol    senna-docusate sodium **AND** polyethylene glycol **AND** bisacodyl **AND** bisacodyl    Calcium Replacement - Follow Nurse / BPA Driven Protocol    dextrose    dextrose    glucagon (human recombinant)    Magnesium Standard Dose Replacement - Follow Nurse / BPA Driven Protocol    Phosphorus Replacement - Follow Nurse / BPA Driven Protocol    Potassium Replacement - Follow Nurse / BPA Driven Protocol    sodium chloride    sodium chloride    sodium chloride    Assessment & Plan   Assessment & Plan     Active Hospital Problems    Diagnosis  POA    **Hypotension [I95.9]  Yes    Low left ventricular ejection fraction [R94.30]  Yes    Sepsis [A41.9]  Yes    Recurrent UTI [N39.0]  Yes    Presence of Watchman left atrial appendage closure device [Z95.818]  Yes    Stage 3 chronic kidney disease [N18.30]  Yes    Persistent atrial fibrillation [I48.19]  Yes    Enlarged prostate without lower urinary tract symptoms (luts) [N40.0]  Yes    Gastroesophageal reflux disease with esophagitis [K21.00]  Yes    Hyperlipidemia LDL goal <100 [E78.5]  Yes    Essential hypertension [I10]  Yes    Type 2 diabetes mellitus with stage 3 chronic kidney disease, without long-term current use of insulin [E11.22, N18.30]  Yes    Obstructive sleep apnea syndrome [G47.33]  Yes      Resolved Hospital Problems   No resolved problems to  display.        Brief Hospital Course to date:  Darien Camarena is a 88 y.o. male admitted with symptomatic hypotension (dizziness) and tachycardia.  Recently treated for complicated UTI and noted history of urinary retention requiring self in and out cath.    Symptomatic hypotension  Tachycardia  A-fib with Watchman device  Chronic systolic congestive heart failure  -Holding diuretics and antihypertensives except metoprolol at lower dose  -Added midodrine  -Cardiology consulted    CKD stage III    COPD    Enlarged prostate  Urinary retention  Recent UTI  -ID consulted for management of antibiotics, currently on Merrem with plan to continue through 2024  -In and out cath every 8 and as needed  -Continue Flomax    Diabetes mellitus type 2  -Fingerstick blood sugar range 1 38-2 15  -Hemoglobin A1c was 6.20 in   -Sliding scale insulin as needed    Dementia    Depression        Expected Discharge Location and Transportation: Discharge home  Expected Discharge   Expected Discharge Date: 2024; Expected Discharge Time:      DVT prophylaxis:  Medical DVT prophylaxis orders are present.         AM-PAC 6 Clicks Score (PT): 19 (24 4879)    CODE STATUS:   Code Status and Medical Interventions:   Ordered at: 24 2214     Code Status (Patient has no pulse and is not breathing):    CPR (Attempt to Resuscitate)     Medical Interventions (Patient has pulse or is breathing):    Full Support       Valeria Wilkins MD  24        Electronically signed by Valeria Wilkins MD at 24 [id number]       Valeria Wilkins MD at 24 0923              [facility] Medicine Services  PROGRESS NOTE    Patient Name: Darien Camarena  : 1935  MRN: [id number]    Date of Admission: 2024  Primary Care Physician: Pito Way MD    Subjective   Subjective     CC:  Dizziness    HPI:  Patient is an 88-year-old who presented to the emergency department with  complaints of dizziness and hypotension.  He was also recently treated for UTI.  Given history of A-fib cardiology was consulted and medications adjusted.  Today he is feeling better and denies current dizziness.  Blood pressure noted to still be low less than 100 systolic.  Tachycardia also noted.      Objective   Objective     Vital Signs:   Temp:  [97.8 °F (36.6 °C)-98.6 °F (37 °C)] 98.6 °F (37 °C)  Heart Rate:  [] 114  Resp:  [16-20] 20  BP: ()/(62-98) 114/71     Physical Exam:  Constitutional: No acute distress, awake, alert  HENT: NCAT, mucous membranes moist  Respiratory: Clear to auscultation bilaterally, respiratory effort normal   Cardiovascular: Tachycardia, heart rate 102  Gastrointestinal: Positive bowel sounds, soft, nontender, nondistended  Musculoskeletal: No bilateral ankle edema  Psychiatric: Appropriate affect, cooperative  Neurologic: Oriented x 3, strength symmetric in all extremities, Cranial Nerves grossly intact to confrontation, speech clear  Skin: No rashes      Results Reviewed:  LAB RESULTS:      Lab 01/26/24 0413 01/25/24 2329 01/25/24 2032 01/25/24  1813 01/25/24  1552   WBC 6.94  --   --   --  7.96   HEMOGLOBIN 11.3*  --   --   --  13.0   HEMATOCRIT 34.6*  --   --   --  39.4   PLATELETS 195  --   --   --  243   NEUTROS ABS  --   --   --   --  5.65   IMMATURE GRANS (ABS)  --   --   --   --  0.05   LYMPHS ABS  --   --   --   --  1.46   MONOS ABS  --   --   --   --  0.46   EOS ABS  --   --   --   --  0.27   MCV 96.6  --   --   --  98.0*   SED RATE  --   --   --   --  124*   CRP  --   --   --  1.18*  --    PROCALCITONIN  --   --   --  0.04  --    LACTATE  --  1.9 2.2*  --  2.9*         Lab 01/26/24 0413 01/25/24  1552   SODIUM 137 136   POTASSIUM 3.7 4.6   CHLORIDE 99 96*   CO2 28.0 27.0   ANION GAP 10.0 13.0   BUN 49* 52*   CREATININE 1.14 1.21   EGFR 62.2 57.9*   GLUCOSE 178* 138*   CALCIUM 8.9 9.3   MAGNESIUM 1.7 1.7         Lab 01/25/24  1552   TOTAL PROTEIN 7.3    ALBUMIN 3.4*   GLOBULIN 3.9   ALT (SGPT) 38   AST (SGOT) 41*   BILIRUBIN 0.6   ALK PHOS 240*         Lab 01/25/24  1813 01/25/24  1552   PROBNP  --  1,428.0   HSTROP T 42* 42*                 Brief Urine Lab Results  (Last result in the past 365 days)        Color   Clarity   Blood   Leuk Est   Nitrite   Protein   CREAT   Urine HCG        01/25/24 1913 Yellow   Cloudy   Negative   Moderate (2+)   Positive   Negative                   Microbiology Results Abnormal       None            XR Chest 1 View    Result Date: 1/25/2024  XR CHEST 1 VW Date of Exam: 1/25/2024 3:34 PM EST Indication: Chest Pain Protocol Comparison: Chest radiograph and chest CT 1/3/2024 Findings: Cardiomediastinal silhouette is unchanged. Similar left hemidiaphragm with left basilar scarring/subsegmental atelectasis. No focal consolidation or overt pulmonary edema. No pleural effusion or pneumothorax. Osseous structures are unchanged.     Impression: Impression: No evidence of acute cardiopulmonary disease. Electronically Signed: Surya Lion MD  1/25/2024 3:52 PM EST  Workstation ID: [id number]     Results for orders placed during the hospital encounter of 01/12/24    Adult Transesophageal Echo 3D (FARHAD) W/ Cont If Necessary Per Protocol    Interpretation Summary    Left ventricular systolic function is mildly decreased. Estimated left ventricular EF = 40%    The left ventricular cavity is mildly dilated.    The left atrial cavity is dilated.    There is a 27mm Watchman device which appears well seated and well compressed. Images obtained with difficulty achieving good resolution of the borders but no periprosthetic flow was seen. No device related thrombus seen.    No aortic valve regurgitation or stenosis is present. There is mild thickening of the aortic valve. The aortic valve appears trileaflet.    There is mild, bileaflet mitral valve thickening present. Mild mitral valve regurgitation is present with a centrally-directed jet noted. No  significant mitral valve stenosis is present.    The tricuspid valve is normal in structure. Trace tricuspid valve regurgitation is present.    Borderline dilation of the ascending aorta is present. Ascending aorta = 3.6 cm No dilation of the descending aorta present. There is moderate plaque in the descending aorta present.      Current medications:  Scheduled Meds:allopurinol, 300 mg, Oral, Daily  aspirin, 81 mg, Oral, Daily  clopidogrel, 75 mg, Oral, Daily  digoxin, 250 mcg, Oral, Daily  donepezil, 10 mg, Oral, Nightly  finasteride, 5 mg, Oral, Nightly  heparin (porcine), 5,000 Units, Subcutaneous, Q8H  lactobacillus acidophilus, 1 capsule, Oral, Daily  memantine, 10 mg, Oral, BID  meropenem, 1,000 mg, Intravenous, Once  meropenem, 1,000 mg, Intravenous, Q8H  metoprolol tartrate, 12.5 mg, Oral, BID  midodrine, 5 mg, Oral, TID AC  multivitamin with minerals, 1 tablet, Oral, Nightly  pantoprazole, 40 mg, Oral, Q AM  pravastatin, 40 mg, Oral, Nightly  senna-docusate sodium, 2 tablet, Oral, BID  sertraline, 50 mg, Oral, Daily  sodium chloride, 10 mL, Intravenous, Q12H  tamsulosin, 0.4 mg, Oral, Daily  tiotropium bromide monohydrate, 2 puff, Inhalation, Daily - RT      Continuous Infusions:   PRN Meds:.  acetaminophen    albuterol    senna-docusate sodium **AND** polyethylene glycol **AND** bisacodyl **AND** bisacodyl    Calcium Replacement - Follow Nurse / BPA Driven Protocol    Magnesium Standard Dose Replacement - Follow Nurse / BPA Driven Protocol    Phosphorus Replacement - Follow Nurse / BPA Driven Protocol    Potassium Replacement - Follow Nurse / BPA Driven Protocol    sodium chloride    sodium chloride    sodium chloride    Assessment & Plan   Assessment & Plan     Active Hospital Problems    Diagnosis  POA    **Hypotension [I95.9]  Yes    Low left ventricular ejection fraction [R94.30]  Yes    Sepsis [A41.9]  Yes    Recurrent UTI [N39.0]  Yes    Presence of Watchman left atrial appendage closure device  [Z95.818]  Yes    Stage 3 chronic kidney disease [N18.30]  Yes    Persistent atrial fibrillation [I48.19]  Yes    Enlarged prostate without lower urinary tract symptoms (luts) [N40.0]  Yes    Gastroesophageal reflux disease with esophagitis [K21.00]  Yes    Hyperlipidemia LDL goal <100 [E78.5]  Yes    Essential hypertension [I10]  Yes    Type 2 diabetes mellitus with stage 3 chronic kidney disease, without long-term current use of insulin [E11.22, N18.30]  Yes    Obstructive sleep apnea syndrome [G47.33]  Yes      Resolved Hospital Problems   No resolved problems to display.        Brief Hospital Course to date:  Darien Camarena is a 88 y.o. male admitted with symptomatic hypotension (dizziness) and tachycardia.  Recently treated for complicated UTI and noted history of urinary retention requiring self in and out cath.    Symptomatic hypotension  Tachycardia  A-fib with Watchman device  Chronic systolic congestive heart failure  -Holding diuretics and antihypertensives except metoprolol at lower dose  -Added midodrine  -Cardiology consulted    CKD stage III    COPD    Enlarged prostate  Urinary retention  Recent UTI  -ID to see for discussion of antibiotics, currently on Merrem  -In and out cath every 8 and as needed  -Continue Flomax    Diabetes mellitus type 2    Dementia    Depression        Expected Discharge Location and Transportation: Discharge home  Expected Discharge   Expected Discharge Date: 1/27/2024; Expected Discharge Time:      DVT prophylaxis:  Medical DVT prophylaxis orders are present.         AM-PAC 6 Clicks Score (PT): 19 (01/26/24 3303)    CODE STATUS:   Code Status and Medical Interventions:   Ordered at: 01/25/24 4418     Code Status (Patient has no pulse and is not breathing):    CPR (Attempt to Resuscitate)     Medical Interventions (Patient has pulse or is breathing):    Full Support       Valeria Wilkins MD  01/26/24        Electronically signed by Valeria Wilkins MD at 01/26/24  1306          Consult Notes (all)        Brandon Philippe MD at 01/26/24 1204        Consult Orders    1. Inpatient Infectious Diseases Consult [[id number]] ordered by Anthony Franklin MD at 01/25/24 9956                     [facility]    INFECTIOUS DISEASE CONSULT/INITIAL HOSPITAL VISIT    Darien Camarena  1935  [id number]    Date of consult: 1/26/2024    Admit date: 1/25/2024    Requesting Provider: Anthony Franklin MD  Evaluating physician: Brandon Philippe MD  Reason for Consultation: Possible recurrent UTI with h/o ESBL  Chief Complaint: Symptoms similar to recent hospitalization on 1/5 with possible UTI.      Subjective   History of present illness:  Patient is a  88 y.o. male, known to Dr. Last Og, with h/o Afib/Watchman device, COPD, T2DM, CKD Stage III, STEVEN/CPAP, HTN, HLD, recurrent UTIs, ESBL, and BPH/urinary retention/self catheterization 4 times a day who was recently treated ESBL E.coli UTI with Invanz for 7 days.  He returned to [facility] ED on 1/25 for lightheadedness, fogginess, and warm sensation from sitting to standing for the past days PTA.  He denies dysuria or hematuria. On arrival, the patient is afebrile with , BP 81/62, and remains on room air with some hypoxia.  Initial labs were , lactic acid 2.9, proBNP 1428, WBC 8000 with 71% neutrophils, creatinine 1.21, CRP 1.15, and PCT 0.04.  A UA WBC was 21-50 with moderate LE and positive nitrite and culture positive for GNR. Blood cultures are pending.  A CXR showed no acute findings.  He is currently on Rocephin.  ID was asked on 1/26 to evaluate and manage his antibiotic therapy.  He has minimal complaints in terms of his urinary tract.  He is feeling better.    Past Medical History:   Diagnosis Date    Arrhythmia     Atrial fibrillation     Bilateral leg cramps     possible new medication related side effects    Chronic kidney disease     Clotting disorder     pt doesnt know about this    COPD  (chronic obstructive pulmonary disease)     Coronary artery disease     Diabetes mellitus     doesnt check sugar    E. coli sepsis 06/23/2022    Enlarged prostate without lower urinary tract symptoms (luts) 06/20/2016    Full dentures     GERD (gastroesophageal reflux disease)     Gout     Hearing aid worn     bilat prn    History of colonoscopy 09/12/2012    History of radiation therapy 02/24/2023    SBRT LLL lung    [place] (hard of hearing)     hearing aids prn    Hyperlipidemia     Hypertension     Kidney stone     surgery x1    Lung cancer     STEVEN on CPAP     compliant with machine    PAF (paroxysmal atrial fibrillation)     Prostatism     Sleep apnea     CPAP HS    Urinary incontinence     Urinary tract infection     Wears glasses     readers       Past Surgical History:   Procedure Laterality Date    ATRIAL APPENDAGE EXCLUSION LEFT WITH TRANSESOPHAGEAL ECHOCARDIOGRAM N/A 11/28/2023    Procedure: Atrial Appendage Occlusion;  Surgeon: Anthony Mcdaniel MD;  Location:  [facility];  Service: Cardiovascular;  Laterality: N/A;    CARDIAC CATHETERIZATION Left 09/29/2022    Procedure: Left Heart Cath;  Surgeon: Titus lOiveros IV, MD;  Location:  [facility];  Service: Cardiovascular;  Laterality: Left;    CARDIOVERSION      CATARACT EXTRACTION Bilateral     COLONOSCOPY      CYSTOSCOPY      ENDOSCOPY      possible    KIDNEY STONE SURGERY      x1       Pediatric History   Patient Parents    Not on file     Other Topics Concern    Not on file   Social History Narrative    Caffeine: 1 cup of decaff coffee daily        family history includes Alzheimer's disease in his mother; COPD in his father; Cancer in his father; Kidney disease in his father; Lung cancer in his father; No Known Problems in his daughter, daughter, and daughter.    Allergies   Allergen Reactions    Xarelto [Rivaroxaban] GI Bleeding     GI bleed    Penicillins Hives     Has tolerated cefepime, ceftriaxone        Immunization History   Administered Date(s) Administered    31-influenza Vac Quardvalent Preservativ 11/01/2018, 10/16/2019    COVID-19 (PFIZER) BIVALENT 12+YRS 12/22/2022    COVID-19 (PFIZER) Purple Cap Monovalent 01/26/2021, 02/19/2021    Fluzone High Dose =>65 Years ([employer] ONLY) 10/01/2016, 09/18/2017, 09/15/2018, 10/12/2019, 09/18/2020, 09/25/2021    Fluzone High-Dose 65+yrs 09/19/2020, 09/25/2021, 12/22/2022, 10/30/2023    Hepatitis A 09/15/2018, 11/01/2018    Pneumococcal Conjugate 13-Valent (PCV13) 10/26/2015    Pneumococcal Polysaccharide (PPSV23) 06/24/2006, 09/18/2020    Pneumococcal, Unspecified 11/30/2006    Shingrix 09/25/2021, 04/15/2023    Td (TDVAX) 06/24/2005    Tdap 06/14/2018       Medication:  @Scheduled Meds:allopurinol, 300 mg, Oral, Daily  aspirin, 81 mg, Oral, Daily  cefTRIAXone, 2,000 mg, Intravenous, Q24H  clopidogrel, 75 mg, Oral, Daily  digoxin, 250 mcg, Intravenous, Once  digoxin, 250 mcg, Oral, Daily  donepezil, 10 mg, Oral, Nightly  finasteride, 5 mg, Oral, Nightly  heparin (porcine), 5,000 Units, Subcutaneous, Q8H  lactobacillus acidophilus, 1 capsule, Oral, Daily  memantine, 10 mg, Oral, BID  metoprolol tartrate, 12.5 mg, Oral, BID  midodrine, 5 mg, Oral, TID AC  multivitamin with minerals, 1 tablet, Oral, Nightly  pantoprazole, 40 mg, Oral, Q AM  pravastatin, 40 mg, Oral, Nightly  senna-docusate sodium, 2 tablet, Oral, BID  sertraline, 50 mg, Oral, Daily  sodium chloride, 10 mL, Intravenous, Q12H  tamsulosin, 0.4 mg, Oral, Daily  tiotropium bromide monohydrate, 2 puff, Inhalation, Daily - RT      Continuous Infusions:   PRN Meds:.  acetaminophen    albuterol    senna-docusate sodium **AND** polyethylene glycol **AND** bisacodyl **AND** bisacodyl    Calcium Replacement - Follow Nurse / BPA Driven Protocol    Magnesium Standard Dose Replacement - Follow Nurse / BPA Driven Protocol    Phosphorus Replacement - Follow Nurse / BPA Driven Protocol    Potassium Replacement - Follow  "Nurse / BPA Driven Protocol    sodium chloride    sodium chloride    sodium chloride     Please refer to the medical record for a full medication list    Review of Systems:    Constitutional-- No Fever, chills or sweats.  Appetite decreased, and some malaise. No fatigue.  HEENT-- No new vision, hearing or throat complaints.  No epistaxis or oral sores.  Denies odynophagia or dysphagia. No headache, photophobia or neck stiffness.  CV-- No chest pain, palpitation or syncope  Resp-- No SOB/cough/Hemoptysis  GI- No nausea, vomiting, or diarrhea.  No hematochezia, melena, or hematemesis. Denies jaundice or chronic liver disease.  -- No dysuria, hematuria, or flank pain.  Denies hesitancy, urgency or burning with urination.  Self caths.  Has some back pain prior to arrival, but better now.   Lymph- no swollen lymph nodes in neck/axilla or groin.   Heme- No active bruising or bleeding; no Hx of DVT or PE.  MS-- no swelling or pain in the bones or joints of arms/legs.  No new back pain.  Neuro-- No acute focal weakness or numbness in the arms or legs.  No seizures.  Some dizziness as per HPI.  Skin--No rashes or lesions    Physical Exam:   Vital Signs   Temp:  [97.8 °F (36.6 °C)-98.6 °F (37 °C)] 98.6 °F (37 °C)  Heart Rate:  [] 118  Resp:  [16-20] 20  BP: ()/(62-98) 82/62    Blood pressure (!) 82/62, pulse 118, temperature 98.6 °F (37 °C), temperature source Oral, resp. rate 20, height 190.5 cm (75\"), weight 112 kg (248 lb), SpO2 94%.  GENERAL: Awake and alert, in mild distress. Appears older than stated age.  Resting in chair.  HEENT:  Normocephalic, atraumatic.  Oropharynx without thrush. Dentition in fair repair. No cervical adenopathy. No neck masses.  Ears externally normal, Nose externally normal.  EYES: PERRL. No conjunctival injection. No icterus. EOM full.  LYMPHATICS: No lymphadenopathy of the neck or axillary or inguinal regions.   HEART: No murmur, gallop, or pericardial friction rub. Reg rate " rhythm, No JVD at 45 degrees.  LUNGS: Clear to auscultation and percussion. No respiratory distress, no use of accessory muscles.  ABDOMEN: Soft, nontender, nondistended. No appreciable HSM.  Bowel sounds normal.  GENITAL: No external lesions, breasts without masses, back straight, no CVAT, rectal external without lesions.   SKIN: Warm and dry without cutaneous eruptions.  No nodules.    PSYCHIATRIC: Mental status lucid. No confusion.  EXT:  No cellulitic change.  NEURO: Oriented to name, CN 2 to 12 intact, DTR 1 + and symmetric, sensory intact to upper and lower extremities, motor 5/5 upper and lower extremity, cerebellar and gait not tested.      Results Review:   I reviewed the patient's new clinical results.  I reviewed the patient's new imaging results and agree with the interpretation.  I reviewed the patient's other test results and agree with the interpretation    Results from last 7 days   Lab Units 01/26/24  0413 01/25/24  1552   WBC 10*3/mm3 6.94 7.96   HEMOGLOBIN g/dL 11.3* 13.0   HEMATOCRIT % 34.6* 39.4   PLATELETS 10*3/mm3 195 243     Results from last 7 days   Lab Units 01/26/24  0413   SODIUM mmol/L 137   POTASSIUM mmol/L 3.7   CHLORIDE mmol/L 99   CO2 mmol/L 28.0   BUN mg/dL 49*   CREATININE mg/dL 1.14   GLUCOSE mg/dL 178*   CALCIUM mg/dL 8.9     Results from last 7 days   Lab Units 01/25/24  1552   ALK PHOS U/L 240*   BILIRUBIN mg/dL 0.6   ALT (SGPT) U/L 38   AST (SGOT) U/L 41*     Results from last 7 days   Lab Units 01/25/24  1552   SED RATE mm/hr 124*     Results from last 7 days   Lab Units 01/25/24  1813   CRP mg/dL 1.18*         Results from last 7 days   Lab Units 01/25/24  2329   LACTATE mmol/L 1.9     Estimated Creatinine Clearance: 61.7 mL/min (by C-G formula based on SCr of 1.14 mg/dL).     Procalitonin Results:      Lab 01/25/24  1813   PROCALCITONIN 0.04      Brief Urine Lab Results  (Last result in the past 365 days)        Color   Clarity   Blood   Leuk Est   Nitrite   Protein    "CREAT   Urine HCG        01/25/24 1913 Yellow   Cloudy   Negative   Moderate (2+)   Positive   Negative                  No results found for: \"SITE\", \"ALLENTEST\", \"PHART\", \"PJB4PFB\", \"PO2ART\", \"RPF3NYO\", \"BASEEXCESS\", \"W6JKJNRX\", \"HGBBG\", \"HCTABG\", \"OXYHEMOGLOBI\", \"METHHGBN\", \"CARBOXYHGB\", \"CO2CT\", \"BAROMETRIC\", \"MODALITY\", \"FIO2\"     Microbiology:  Microbiology Results (last 10 days)       Procedure Component Value - Date/Time    Urine Culture - Urine, Urine, Clean Catch [[id number]]  (Abnormal) Collected: 01/25/24 1913    Lab Status: Preliminary result Specimen: Urine, Clean Catch Updated: 01/26/24 1142     Urine Culture >100,000 CFU/mL Gram Negative Bacilli    Narrative:      Colonization of the urinary tract without infection is common. Treatment is discouraged unless the patient is symptomatic, pregnant, or undergoing an invasive urologic procedure.                Radiology:  Imaging Results (Last 72 Hours)       Procedure Component Value Units Date/Time    XR Chest 1 View [[id number]] Collected: 01/25/24 1550     Updated: 01/25/24 1555    Narrative:      XR CHEST 1 VW    Date of Exam: 1/25/2024 3:34 PM EST    Indication: Chest Pain Protocol    Comparison: Chest radiograph and chest CT 1/3/2024    Findings:  Cardiomediastinal silhouette is unchanged. Similar left hemidiaphragm with left basilar scarring/subsegmental atelectasis. No focal consolidation or overt pulmonary edema. No pleural effusion or pneumothorax. Osseous structures are unchanged.      Impression:      Impression:  No evidence of acute cardiopulmonary disease.       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  Benign prostatic hypertrophy with urinary retention/self catheterizations 4 times a day.  Increases risk for recurrent UTI.  Paroxysmal atrial " fibrillation/Watchman procedure/Plavix  Type 2 diabetes mellitus  Chronic kidney disease Stage IIIa  Obesity  Essential hypertension  Obstructive sleep apnea/CPAP  Penicillin allergy (hives). Tolerated Rocephin in past.   Anemia, chronic disease.      RECOMMENDATIONS:  Diagnostically, follow blood and urine cultures, physical examination, imaging, CBC, CMP, ESR, CRP, procalcitonin, lactic acid.  Therapeutically, Stop Rocephin and change to Merrem 1 GM IV Q8H pending cultures.  He will likely need about 7 days of antibiotics until 2/2/24.  Supportive care.  At risk for deconditioning.    I discussed the patient's findings and my recommendations with patient, nursing staff, and primary care team    Thank you for asking me to see Darien Camarena.  Our group would be pleased to follow this patient over the course of their hospitalization and assist with outpatient antimicrobial therapy, as indicated.  Further recommendations depend on the results of the cultures and clinical course.    Brandon Philippe MD saw and examined patient, verified hx and PE, read all radiographic studies, reviewed labs and micro data, and formulated dx, plan for treatment and all medical decision making.      PRATIK RuffinC for Brandon Philippe MD.  See next on Monday, call sooner if needed.      BRIGETTE Ruffin  1/26/2024      Electronically signed by Brandon Philippe MD at 01/26/24 1634       Jose Dennis MD at 01/26/24 0838        Consult Orders    1. Inpatient Cardiology Consult [[id number]] ordered by Antohny Franklin MD                 Inpatient Cardiology Consult  Consult performed by: Blanquita Ansari APRN  Consult ordered by: Anthony Franklin MD        [facility]  Cardiovascular Consultation Note    Reason for consultation: Hypotension  Subjective   Patient is an 88-year-old gentleman with a history of persistent atrial fibrillation status post Watchman device, stage III  chronic kidney disease, type 2 diabetes mellitus, recurrent UTI's with nephrostomy tube in the past, hypertension and hyperlipidemia who we are being asked to consult for assistance with medication adjustments after patient presented to [facility] yesterday with dizziness, and weakness and was found to be hypotensive.  Blood pressure on arrival was low at 81/62.  On admission there was concern for potential urosepsis as the patient had just been hospitalized and discharged earlier on 1/5 when he presented with similar symptoms and was found to have evidence of UTI for which infectious disease followed.  This admission his lactic acid was elevated at 2.9, but WBCs were within normal limits and procalcitonin was negative.  Urinalysis this admission shows ongoing UTI.  He has been treated with IV fluids and started on antibiotics.  CT angiogram was negative for pulmonary embolism and no acute CHF.  EKG showed atrial fibrillation at 105 bpm.  He is currently in atrial fibrillation with rates in the low 100s on telemetry.  His metoprolol dose has been decreased from 100 twice daily to 25 twice daily.  His valsartan, metolazone and Lasix are on hold.  Blood pressure low this morning at 82/62 and he has been started on midodrine at 5 mg 3 times daily by the hospitalist.  He is currently lying in bed breathing easy on room air.  He denies any chest pain or shortness of breath.  The patient failed sotalol therapy in the past he had a return of his atrial fibrillation in 2022.  He underwent external cardioversion that was successful restoring normal sinus rhythm and started on amiodarone which kept him in normal sinus rhythm until June 2023.  Cardioversion was attempted at that time but unsuccessful.  He was ultimately referred to EP for his persistent atrial fibrillation as well as intolerance of Eliquis due to ongoing hematuria.  He was evaluated by Dr. Mcdaniel with EP and amiodarone was discontinued in September  as it was felt he was asymptomatic with his atrial fibrillation and had previously had no cardiomyopathy and for concerns of amiodarone toxicityAssociate with long-term use.  Rate control strategy for his atrial fibrillation was chosen and metoprolol dosing was increased.  He ultimately underwent a Watchman device in November due to being intolerant to anticoagulation from recurrent hematuria.  A follow-up FARHAD reformed earlier this month showed well-seated device but suggested a reduced LVEF of 40%.  He did have a cardiac catheterization in September 2022 for shortness of breath and coronary angiography showed minimal CAD.  He has no signs or symptoms of heart failure and appears euvolemic.  He denies chest pain or shortness of breath.  His only complaint is of intermittent dizziness/lightheadedness and low blood pressure.  Orthostatics were positive this admission.  He remains hypotensive with a systolic in the 80s despite having received over a liter of fluid.      Cardiac risk factors: Advanced age, hyperlipidemia    Past medical and surgical history, social and family history reviewed in EMR.    REVIEW OF SYSTEMS:   H&P ROS reviewed and pertinent CV ROS as noted in HPI.    Objective     Vital Sign Min/Max for last 24 hours  Temp  Min: 97.8 °F (36.6 °C)  Max: 98.6 °F (37 °C)   BP  Min: 81/62  Max: 138/97   Pulse  Min: 23  Max: 125   Resp  Min: 16  Max: 20   SpO2  Min: 84 %  Max: 97 %   No data recorded      Intake/Output Summary (Last 24 hours) at 1/26/2024 1138  Last data filed at 1/26/2024 0830  Gross per 24 hour   Intake 1600 ml   Output 1300 ml   Net 300 ml           Constitutional:       General: Awake.      Appearance: Healthy appearance.   Neck:      Vascular: No JVD.   Pulmonary:      Effort: Pulmonary effort is normal.      Breath sounds: Normal breath sounds.   Cardiovascular:      Tachycardia present. Irregularly irregular rhythm.      Murmurs: There is no murmur.   Pulses:     Intact distal pulses.    Edema:     Peripheral edema absent.   Skin:     General: Skin is warm and dry.   Neurological:      Mental Status: Alert and oriented to person, place and time.   Psychiatric:         Behavior: Behavior is cooperative.          EK2024 A-fib with RVR at 105 bpm    Lab Review:   Labs reviewed in the electronic medical record.  Pertinent findings include:  Lab Results   Component Value Date    GLUCOSE 178 (H) 2024    BUN 49 (H) 2024    CREATININE 1.14 2024    EGFR 62.2 2024    BCR 43.0 (H) 2024    K 3.7 2024    CO2 28.0 2024    CALCIUM 8.9 2024    ALBUMIN 3.4 (L) 2024    BILITOT 0.6 2024    AST 41 (H) 2024    ALT 38 2024     Lab Results   Component Value Date    WBC 6.94 2024    HGB 11.3 (L) 2024    HCT 34.6 (L) 2024    MCV 96.6 2024     2024     Lab Results   Component Value Date    CHOL 98 10/30/2023    TRIG 127 10/30/2023    HDL 34 (L) 10/30/2023    LDL 41 10/30/2023             Assessment   Active Hospital Problems    Diagnosis     **Hypotension     Sepsis     Recurrent UTI     Stage 3 chronic kidney disease     Persistent atrial fibrillation      Diagnosed 2012.   FARHAD-guided ECV, 2012  CHADS-VASc 5 (age > 75, CAD, HTN, DM)  Successful external cardioversion for asymptomatic atrial fibrillation with RVR, 10/25/18  Successful cardioversion for atrial fibrillation RVR, 3/6/19.  Sotalol increased  Minimally symptomatic atrial fibrillation with cardioversion and the ER, 10/31/2019  ED cardioversion for A. fib with RVR, 2020  ED cardioversion for asymptomatic A. fib with RVR, 2020   ED cardioversion for asymptomatic A. fib with RVR x 2  occasions, 2021  Transition from sotalol to amiodarone, 2021  Successful FARHAD/ECV May 3, 2021  Successful cardioversion, 2022  Unsuccessful cardioversion 2023  Echo (2022): EF 50%, anatomically and functionally normal  valves  Successful insertion of 27 mm Watchman device/left atrial pended occlusive device with Dr. Mcdaniel 11/28/2023  FARHAD (1/12/2024): LVEF = 40%.  27 mm Watchman device well-seated.  No thrombus or periprosthetic flow.  Mild MR but no significant valvular abnormality        Type 2 diabetes mellitus with stage 3 chronic kidney disease, without long-term current use of insulin     Low left ventricular ejection fraction     Presence of Watchman left atrial appendage closure device     Gastroesophageal reflux disease with esophagitis     Hyperlipidemia LDL goal <100      Moderate intensity statin therapy reasonable due to diabetic status      Essential hypertension      Target blood pressure <130/80 mmHg      Obstructive sleep apnea syndrome      Compliant with CPAP      Enlarged prostate without lower urinary tract symptoms (luts)           Plan       Hypotension  In the setting of possible urosepsis  Continue to hold metolazone, Lasix and valsartan  Agree with midodrine 5 mg 3 times daily as started by hospitalist  Decrease metoprolol to tartrate 12.5 mg twice daily    Persistent atrial fibrillation/recent Watchman device  Currently in A-fib with rates not well-controlled  Has failed sotalol therapy in the past.  Evaluated by EP and amiodarone discontinued due to concerns of toxicity.  Metoprolol was increased to 100 twice daily for rate control as it was felt patient was asymptomatic with his A-fib   Watchman device placed 11/28 by Dr. Mcdaniel as patient was having recurrent hematuria and intolerant to anticoagulation  Defer NOAC due to Watchman device as recent FARHAD showed well-seated device  Continue aspirin and Plavix for Watchman device  Start digoxin 250 mcg daily for better rate control of atrial fibrillation  May need monitor at discharge to assess for rate control of atrial fibrillation    Hyperlipidemia  Pravastatin 40 mg daily    Reduced LVEF/systolic heart failure  FARHAD performed earlier this month showed a  "reduced LVEF of 40% which appears new  Could be tachycardic induced from rates not being well-controlled from A-fib as patient had minimal CAD noted on cath 2022   Currently appears euvolemic and compensated  Repeat limited echo for reevaluation of LVEF      UTI/recurrent in nature  Management per hospitalist  Blood cultures pending    BERYL Paiz  I discussed the case with the physician extender as well as the family and the patient.  The patient denies dyspnea with activities of daily living but does get short of breath if he does more than just walk around the house.  His daughter also notes occasional dyspnea.  The patient does not feel his heart racing.  He has had no chest pain.  He states before come to the hospital he would get lightheaded when he stood up.  His blood pressures been tenuous so all his medications have been held except metoprolol.  He got a dose of metoprolol last evening and today but his systolic was as low as 82.  He was started on midodrine therapy.  On physical exam neck has no JVP  Cardiovascular-tachycardic with no murmur and no edema to palpation  Respiratory-equal bilateral symmetrical expansion\" bilaterally  Lower extremities no edema  Neuro-facial expressions are symmetrical moves all 4 extremities  This time I recommend IV digoxin since his resting rate is fast.  Will also put him on p.o. digoxin since his blood pressure is tenuous.  If his blood pressure goes up with midodrine therapy we can add his beta-blockers back in but if his blood pressure remains tenuous we could add Corlanor for some heart rate control and could also consider an AV prabhu ablation and pacemaker insertion if the above medical therapy fails    Electronically signed by Jose Dennis MD at 01/26/24 1152       "

## 2024-01-29 NOTE — THERAPY TREATMENT NOTE
Patient Name: Darien Camarena  : 1935    MRN: [id number]                              Today's Date: 2024       Admit Date: 2024    Visit Dx:     ICD-10-CM ICD-9-CM   1. Sepsis, due to unspecified organism, unspecified whether acute organ dysfunction present  A41.9 038.9     995.91   2. Acute cystitis without hematuria  N30.00 595.0   3. Essential hypertension  I10 401.9   4. Persistent atrial fibrillation  I48.19 427.31   5. Coronary artery disease involving native coronary artery of native heart with angina pectoris  I25.119 414.01     413.9   6. Malignant neoplasm of lower lobe of left lung  C34.32 162.5   7. Chronic bilateral low back pain without sciatica  M54.50 724.2    G89.29 338.29   8. Low left ventricular ejection fraction  R94.30 794.30     Patient Active Problem List   Diagnosis    Atopic rhinitis    Benign (familial) paraproteinemia    Type 2 diabetes mellitus with stage 3 chronic kidney disease, without long-term current use of insulin    Enlarged prostate without lower urinary tract symptoms (luts)    Gastroesophageal reflux disease with esophagitis    Polyp of gallbladder    Gout    Hyperlipidemia LDL goal <100    Essential hypertension    Nephrolithiasis    Obstructive sleep apnea syndrome    Persistent atrial fibrillation    Stage 3 chronic kidney disease    Long term current use of antiarrhythmic medical therapy    Hydronephrosis    Coronary artery disease involving native coronary artery of native heart with angina pectoris    Malignant neoplasm of lower lobe of left lung    Chronic bilateral low back pain without sciatica    Other microscopic hematuria    H/O: GI bleed    Presence of Watchman left atrial appendage closure device    Recurrent UTI    Acute kidney injury superimposed on chronic kidney disease    Sepsis    Hypotension    Chronic systolic heart failure     Past Medical History:   Diagnosis Date    Arrhythmia     Atrial fibrillation     Bilateral leg cramps      possible new medication related side effects    Chronic kidney disease     Clotting disorder     pt doesnt know about this    COPD (chronic obstructive pulmonary disease)     Coronary artery disease     Diabetes mellitus     doesnt check sugar    E. coli sepsis 06/23/2022    Enlarged prostate without lower urinary tract symptoms (luts) 06/20/2016    Full dentures     GERD (gastroesophageal reflux disease)     Gout     Hearing aid worn     bilat prn    History of colonoscopy 09/12/2012    History of radiation therapy 02/24/2023    SBRT LLL lung    [place] (hard of hearing)     hearing aids prn    Hyperlipidemia     Hypertension     Kidney stone     surgery x1    Lung cancer     STEVEN on CPAP     compliant with machine    PAF (paroxysmal atrial fibrillation)     Prostatism     Sleep apnea     CPAP HS    Urinary incontinence     Urinary tract infection     Wears glasses     readers     Past Surgical History:   Procedure Laterality Date    ATRIAL APPENDAGE EXCLUSION LEFT WITH TRANSESOPHAGEAL ECHOCARDIOGRAM N/A 11/28/2023    Procedure: Atrial Appendage Occlusion;  Surgeon: Anthony Mcadniel MD;  Location:  [facility];  Service: Cardiovascular;  Laterality: N/A;    CARDIAC CATHETERIZATION Left 09/29/2022    Procedure: Left Heart Cath;  Surgeon: Titus Oliveros IV, MD;  Location:  [facility];  Service: Cardiovascular;  Laterality: Left;    CARDIOVERSION      CATARACT EXTRACTION Bilateral     COLONOSCOPY      CYSTOSCOPY      ENDOSCOPY      possible    KIDNEY STONE SURGERY      x1      General Information       Row Name 01/29/24 9005          Physical Therapy Time and Intention    Document Type therapy note (daily note)  -AE     Mode of Treatment physical therapy  -AE       Row Name 01/29/24 5249          General Information    Patient Profile Reviewed yes  -AE     Existing Precautions/Restrictions fall;orthostatic hypotension  afib; monitor BP  -AE     Barriers to Rehab medically complex   -AE       Row Name 01/29/24 1335          Cognition    Orientation Status (Cognition) oriented x 3  -AE       Row Name 01/29/24 1335          Safety Issues, Functional Mobility    Safety Issues Affecting Function (Mobility) awareness of need for assistance;insight into deficits/self-awareness;safety precaution awareness;sequencing abilities  -AE     Impairments Affecting Function (Mobility) balance;endurance/activity tolerance;strength  -AE               User Key  (r) = Recorded By, (t) = Taken By, (c) = Cosigned By      Initials Name Provider Type    AE Flex Stahl, PT Physical Therapist                   Mobility       Row Name 01/29/24 1336          Bed Mobility    Bed Mobility supine-sit  -AE     Supine-Sit [place] (Bed Mobility) standby assist  -AE     Assistive Device (Bed Mobility) head of bed elevated;bed rails  -AE     Comment, (Bed Mobility) No cues required for bed mobility. No c/o dizziness with mobility.  -AE       Row Name 01/29/24 1336          Transfers    Comment, (Transfers) VCs for hand placement and sequencing. Pt required increased time for monitoring BP.  -AE       Row Name 01/29/24 1336          Sit-Stand Transfer    Sit-Stand [place] (Transfers) contact guard;1 person assist  -AE     Assistive Device (Sit-Stand Transfers) walker, front-wheeled  -AE       Row Name 01/29/24 1336          Gait/Stairs (Locomotion)    [place] Level (Gait) contact guard;1 person assist;verbal cues  -AE     Assistive Device (Gait) walker, front-wheeled  -AE     Distance in Feet (Gait) 100  -AE     Deviations/Abnormal Patterns (Gait) bilateral deviations;marilyn decreased;gait speed decreased;stride length decreased  -AE     Bilateral Gait Deviations forward flexed posture  -AE     Comment, (Gait/Stairs) Pt demo step through gait pattern with slowed marilyn, decreased step length, and forward flexed posture. BP dropping with positional changes, no c/o light headedness or dizziness throughout. Pt  able to ambulate with RW with increase in BP noted.  -AE               User Key  (r) = Recorded By, (t) = Taken By, (c) = Cosigned By      Initials Name Provider Type    AE Flex Stahl PT Physical Therapist                   Obj/Interventions       Row Name 01/29/24 1341          Motor Skills    Therapeutic Exercise hip;knee;ankle  -AE       Row Name 01/29/24 1341          Hip (Therapeutic Exercise)    Hip (Therapeutic Exercise) strengthening exercise  -AE     Hip Strengthening (Therapeutic Exercise) bilateral;marching while seated;10 repetitions;sitting  -AE       Row Name 01/29/24 1341          Knee (Therapeutic Exercise)    Knee (Therapeutic Exercise) strengthening exercise  -AE     Knee Strengthening (Therapeutic Exercise) LAQ (long arc quad);15 repititions;sitting  -AE       Row Name 01/29/24 1341          Ankle (Therapeutic Exercise)    Ankle (Therapeutic Exercise) strengthening exercise  -AE     Ankle Strengthening (Therapeutic Exercise) bilateral;dorsiflexion;plantarflexion;15 repititions  -AE       Row Name 01/29/24 1341          Balance    Balance Assessment sitting static balance;sitting dynamic balance;sit to stand dynamic balance;standing static balance;standing dynamic balance  -AE     Static Sitting Balance standby assist  -AE     Dynamic Sitting Balance standby assist  -AE     Position, Sitting Balance unsupported;sitting edge of bed  -AE     Sit to Stand Dynamic Balance contact guard;1-person assist;verbal cues  -AE     Static Standing Balance contact guard  -AE     Dynamic Standing Balance contact guard  -AE     Position/Device Used, Standing Balance supported;walker, front-wheeled  -AE               User Key  (r) = Recorded By, (t) = Taken By, (c) = Cosigned By      Initials Name Provider Type    AE Flex Stahl PT Physical Therapist                   Goals/Plan    No documentation.                  Clinical Impression       Row Name 01/29/24 1343          Pain    Pretreatment Pain  Rating 0/10 - no pain  -AE     Posttreatment Pain Rating 0/10 - no pain  -AE       Row Name 01/29/24 1343          Plan of Care Review    Plan of Care Reviewed With patient  -AE     Progress no change  -AE     Outcome Evaluation Pt continues to be limited by orthostatic hypotension and weakness. Pt ambulated 100ft with CGA and RW for support. BP drops noted with positional changes but increased after ambulation. Recommend RW for d/c. Continue to progress per pt tolerance.  -AE       Row Name 01/29/24 1343          Vital Signs    Pre Systolic BP Rehab 114  -AE     Pre Treatment Diastolic BP 72  -AE     Intra Systolic BP Rehab 98   98/68 sitting EOB, 72/55 standing, 98/59 after ambulating 20ft  -AE     Intra Treatment Diastolic BP 68  -AE     Post Systolic BP Rehab 141  sitting EOB  -AE     Post Treatment Diastolic BP 83  -AE     Pretreatment Heart Rate (beats/min) 84  -AE     Posttreatment Heart Rate (beats/min) 92  -AE     O2 Delivery Pre Treatment room air  -AE     O2 Delivery Intra Treatment room air  -AE     O2 Delivery Post Treatment room air  -AE     Pre Patient Position Supine  -AE     Intra Patient Position Standing  -AE     Post Patient Position Sitting  -AE       Row Name 01/29/24 1343          Positioning and Restraints    Pre-Treatment Position in bed  -AE     Post Treatment Position bed  -AE     In Bed notified nsg;sitting EOB;call light within reach;encouraged to call for assist;side rails up x2  -AE               User Key  (r) = Recorded By, (t) = Taken By, (c) = Cosigned By      Initials Name Provider Type    AE Flex Stahl, PT Physical Therapist                   Outcome Measures       Row Name 01/29/24 1347 01/29/24 0800       How much help from another person do you currently need...    Turning from your back to your side while in flat bed without using bedrails? 4  -AE 4  -AR    Moving from lying on back to sitting on the side of a flat bed without bedrails? 4  -AE 4  -AR    Moving to and  from a bed to a chair (including a wheelchair)? 3  -AE 3  -AR    Standing up from a chair using your arms (e.g., wheelchair, bedside chair)? 3  -AE 3  -AR    Climbing 3-5 steps with a railing? 3  -AE 3  -AR    To walk in hospital room? 3  -AE 3  -AR    AM-PAC 6 Clicks Score (PT) 20  -AE 20  -AR    Highest Level of Mobility Goal 6 --> Walk 10 steps or more  -AE 6 --> Walk 10 steps or more  -AR      Row Name 01/29/24 1347          Functional Assessment    Outcome Measure Options AM-PAC 6 Clicks Basic Mobility (PT)  -AE               User Key  (r) = Recorded By, (t) = Taken By, (c) = Cosigned By      Initials Name Provider Type    AR Anastasia Negrete, RN Registered Nurse    Flex Viera, PT Physical Therapist                                 Physical Therapy Education       Title: PT OT SLP Therapies (In Progress)       Topic: Physical Therapy (In Progress)       Point: Mobility training (Done)       Learning Progress Summary             Patient Acceptance, E, VU by AE at 1/29/2024 1301                         Point: Home exercise program (Not Started)       Learner Progress:  Not documented in this visit.              Point: Body mechanics (Done)       Learning Progress Summary             Patient Acceptance, E, VU by AE at 1/29/2024 1301                         Point: Precautions (Done)       Learning Progress Summary             Patient Acceptance, E, VU by AE at 1/29/2024 1301                                         User Key       Initials Effective Dates Name Provider Type Discipline    AE 09/21/21 -  Flex Stahl, PT Physical Therapist PT                  PT Recommendation and Plan     Plan of Care Reviewed With: patient  Progress: no change  Outcome Evaluation: Pt continues to be limited by orthostatic hypotension and weakness. Pt ambulated 100ft with CGA and RW for support. BP drops noted with positional changes but increased after ambulation. Recommend RW for d/c. Continue to progress per pt  tolerance.     Time Calculation:         PT Charges       Row Name 01/29/24 1350             Time Calculation    Start Time 1301  -AE      PT Received On 01/29/24  -AE      PT Goal Re-Cert Due Date 02/05/24  -AE         Time Calculation- PT    Total Timed Code Minutes- PT 27 minute(s)  -AE         Timed Charges    66872 - PT Therapeutic Activity Minutes 27  -AE         Total Minutes    Timed Charges Total Minutes 27  -AE       Total Minutes 27  -AE                User Key  (r) = Recorded By, (t) = Taken By, (c) = Cosigned By      Initials Name Provider Type    AE Flex Stahl, PT Physical Therapist                  Therapy Charges for Today       Code Description Service Date Service Provider Modifiers Qty    55952291780 HC PT THERAPEUTIC ACT EA 15 MIN 1/29/2024 Flex Stahl, PT GP 2            PT G-Codes  Outcome Measure Options: AM-PAC 6 Clicks Basic Mobility (PT)  AM-PAC 6 Clicks Score (PT): 20  PT Discharge Summary  Anticipated Discharge Disposition (PT): home with outpatient therapy services, home with assist    Flex Stahl PT  1/29/2024

## 2024-01-29 NOTE — PLAN OF CARE
Goal Outcome Evaluation:      A/O x4, RA, VSS, A-fib. UWA/SBA to bathroom. No c/o pain or nausea. I/O cath w/ 800 mL output.     Progress: improving         Problem: Adult Inpatient Plan of Care  Goal: Plan of Care Review  Outcome: Ongoing, Progressing  Flowsheets (Taken 1/29/2024 0418)  Progress: improving  Goal: Patient-Specific Goal (Individualized)  Outcome: Ongoing, Progressing  Goal: Absence of Hospital-Acquired Illness or Injury  Outcome: Ongoing, Progressing  Intervention: Identify and Manage Fall Risk  Recent Flowsheet Documentation  Taken 1/29/2024 0417 by Zaida Oliveros, RN  Safety Promotion/Fall Prevention:   activity supervised   assistive device/personal items within reach   clutter free environment maintained   nonskid shoes/slippers when out of bed   room organization consistent   safety round/check completed  Taken 1/29/2024 0207 by Zaida Oliveros, RN  Safety Promotion/Fall Prevention:   activity supervised   assistive device/personal items within reach   clutter free environment maintained   nonskid shoes/slippers when out of bed   room organization consistent   safety round/check completed  Taken 1/29/2024 0050 by Zaida Oliveros, RN  Safety Promotion/Fall Prevention:   activity supervised   clutter free environment maintained   assistive device/personal items within reach   nonskid shoes/slippers when out of bed   room organization consistent   safety round/check completed  Taken 1/28/2024 2215 by Zaida Oliveros, RN  Safety Promotion/Fall Prevention:   activity supervised   assistive device/personal items within reach   clutter free environment maintained   nonskid shoes/slippers when out of bed   room organization consistent   safety round/check completed  Taken 1/28/2024 2045 by Zaida Oliveros, RN  Safety Promotion/Fall Prevention:   activity supervised   assistive device/personal items within reach   clutter free environment maintained   nonskid shoes/slippers when out of  bed   room organization consistent   safety round/check completed  Intervention: Prevent Skin Injury  Recent Flowsheet Documentation  Taken 1/29/2024 0417 by Zaida Oliveros RN  Body Position: position changed independently  Skin Protection:   adhesive use limited   incontinence pads utilized   transparent dressing maintained  Taken 1/29/2024 0207 by Zaida Oliveros RN  Body Position: position changed independently  Skin Protection:   adhesive use limited   incontinence pads utilized   transparent dressing maintained  Taken 1/29/2024 0050 by Zaida Oliveros RN  Body Position: position changed independently  Skin Protection:   adhesive use limited   incontinence pads utilized   transparent dressing maintained  Taken 1/28/2024 2215 by Zaida Oliveros RN  Body Position: position changed independently  Skin Protection:   adhesive use limited   incontinence pads utilized   transparent dressing maintained  Taken 1/28/2024 2045 by Zaida Oliveros RN  Body Position: position changed independently  Skin Protection:   adhesive use limited   incontinence pads utilized   transparent dressing maintained  Intervention: Prevent and Manage VTE (Venous Thromboembolism) Risk  Recent Flowsheet Documentation  Taken 1/29/2024 0417 by Zaida Oliveros RN  Activity Management: activity encouraged  Taken 1/29/2024 0207 by Zaida Oliveros RN  Activity Management: activity encouraged  Taken 1/29/2024 0050 by Zaida Oliveros RN  Activity Management: activity encouraged  Taken 1/28/2024 2215 by Zaida Oliveros RN  Activity Management: activity encouraged  Taken 1/28/2024 2045 by Zaida Oliveros RN  Activity Management: activity encouraged  VTE Prevention/Management: (See MAR) other (see comments)  Range of Motion: active ROM (range of motion) encouraged  Intervention: Prevent Infection  Recent Flowsheet Documentation  Taken 1/28/2024 2045 by Zaida Oliveros RN  Infection Prevention:   cohorting  utilized   environmental surveillance performed   equipment surfaces disinfected   hand hygiene promoted   rest/sleep promoted   single patient room provided  Goal: Optimal Comfort and Wellbeing  Outcome: Ongoing, Progressing  Intervention: Provide Person-Centered Care  Recent Flowsheet Documentation  Taken 1/28/2024 2045 by Zaida Oliveros RN  Trust Relationship/Rapport:   care explained   questions answered   thoughts/feelings acknowledged  Goal: Readiness for Transition of Care  Outcome: Ongoing, Progressing     Problem: Skin Injury Risk Increased  Goal: Skin Health and Integrity  Outcome: Ongoing, Progressing  Intervention: Optimize Skin Protection  Recent Flowsheet Documentation  Taken 1/29/2024 0417 by Zaida Oliveros RN  Pressure Reduction Techniques: frequent weight shift encouraged  Head of Bed (HOB) Positioning: HOB lowered  Pressure Reduction Devices: pressure-redistributing mattress utilized  Skin Protection:   adhesive use limited   incontinence pads utilized   transparent dressing maintained  Taken 1/29/2024 0207 by Zaida Oliveros RN  Pressure Reduction Techniques: frequent weight shift encouraged  Head of Bed (HOB) Positioning: [facility] elevated  Pressure Reduction Devices: pressure-redistributing mattress utilized  Skin Protection:   adhesive use limited   incontinence pads utilized   transparent dressing maintained  Taken 1/29/2024 0050 by Zaida Oliveros RN  Pressure Reduction Techniques: frequent weight shift encouraged  Head of Bed (HOB) Positioning: HOB lowered  Pressure Reduction Devices: pressure-redistributing mattress utilized  Skin Protection:   adhesive use limited   incontinence pads utilized   transparent dressing maintained  Taken 1/28/2024 2215 by Zaida Oliveros RN  Pressure Reduction Techniques: frequent weight shift encouraged  Head of Bed (HOB) Positioning: HOB lowered  Pressure Reduction Devices: pressure-redistributing mattress utilized  Skin Protection:    adhesive use limited   incontinence pads utilized   transparent dressing maintained  Taken 1/28/2024 2045 by Zaida Oliveros RN  Pressure Reduction Techniques: frequent weight shift encouraged  Head of Bed (HOB) Positioning: HOB lowered  Pressure Reduction Devices: pressure-redistributing mattress utilized  Skin Protection:   adhesive use limited   incontinence pads utilized   transparent dressing maintained     Problem: Fall Injury Risk  Goal: Absence of Fall and Fall-Related Injury  Outcome: Ongoing, Progressing  Intervention: Identify and Manage Contributors  Recent Flowsheet Documentation  Taken 1/29/2024 0417 by Zaida Oliveros RN  Medication Review/Management: medications reviewed  Self-Care Promotion: BADL personal objects within reach  Taken 1/29/2024 0207 by Zaida Oliveros RN  Medication Review/Management: medications reviewed  Self-Care Promotion:   independence encouraged   BADL personal objects within reach  Taken 1/29/2024 0050 by Zaida Oliveros RN  Medication Review/Management: medications reviewed  Self-Care Promotion:   independence encouraged   BADL personal objects within reach  Taken 1/28/2024 2215 by Zaida Oliveros RN  Medication Review/Management: medications reviewed  Self-Care Promotion:   independence encouraged   BADL personal objects within reach  Taken 1/28/2024 2045 by Zaida Oliveros RN  Medication Review/Management: medications reviewed  Self-Care Promotion:   independence encouraged   BADL personal objects within reach  Intervention: Promote Injury-Free Environment  Recent Flowsheet Documentation  Taken 1/29/2024 0417 by Zaida Oliveros RN  Safety Promotion/Fall Prevention:   activity supervised   assistive device/personal items within reach   clutter free environment maintained   nonskid shoes/slippers when out of bed   room organization consistent   safety round/check completed  Taken 1/29/2024 0207 by Zaida Oliveros RN  Safety Promotion/Fall  Prevention:   activity supervised   assistive device/personal items within reach   clutter free environment maintained   nonskid shoes/slippers when out of bed   room organization consistent   safety round/check completed  Taken 1/29/2024 0050 by Zaida Oliveros, RN  Safety Promotion/Fall Prevention:   activity supervised   clutter free environment maintained   assistive device/personal items within reach   nonskid shoes/slippers when out of bed   room organization consistent   safety round/check completed  Taken 1/28/2024 2215 by Zaida Oliveros, RN  Safety Promotion/Fall Prevention:   activity supervised   assistive device/personal items within reach   clutter free environment maintained   nonskid shoes/slippers when out of bed   room organization consistent   safety round/check completed  Taken 1/28/2024 2045 by Zaida Oliveros, RN  Safety Promotion/Fall Prevention:   activity supervised   assistive device/personal items within reach   clutter free environment maintained   nonskid shoes/slippers when out of bed   room organization consistent   safety round/check completed

## 2024-01-29 NOTE — TELEPHONE ENCOUNTER
PATIENT'S DAUGHTER MENTIONED THAT THIS IS PATIENT'S 2ND HOSPITAL STAY WITH A UTI SINCE 1/1/24. WANTS TO KNOW IF YOU ALL HAVE RECEIVED THE NOTES.

## 2024-01-29 NOTE — CASE MANAGEMENT/SOCIAL WORK
Continued Stay Note   [facility]     Patient Name: Darien Camarena  MRN: [id number]  Today's Date: 1/29/2024    Admit Date: 1/25/2024    Plan: home   Discharge Plan       Row Name 01/29/24 1006       Plan    Plan home    Patient/Family in Agreement with Plan yes    Plan Comments Met with Mr. Mccartney at the bedside to discuss discharge plan. His plan is home, and his family will transport. He reports that his daughters will assist with any needs while he recovers.  will continue to follow plan of care and assist with discharge planning needs as indicated.    Final Discharge Disposition Code 01 - home or self-care                   Discharge Codes    No documentation.                 Expected Discharge Date and Time       Expected Discharge Date Expected Discharge Time    Jan 29, 2024               Scarlet Martinez RN

## 2024-01-29 NOTE — TELEPHONE ENCOUNTER
Both hospital stays with  Gue[facility], all notes in chart. ID following for complex UTI and abx management.

## 2024-01-29 NOTE — PROGRESS NOTES
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  27 mm Watchman device well-seated.  No thrombus or periprosthetic flow.  Mild MR but no significant valvular abnormality  Echo (1/27/2024): LVEF 48%      Recurrent UTI [N39.0]  Yes     Priority: Medium    Permanent atrial fibrillation [I48.21]  Yes     Priority: Medium     Diagnosed 2012.   FARHAD-guided ECV, 8/17/2012  CHADS-VASc 5 (age > 75, CAD, HTN, DM)  Multiple cardioversions, 2018, 2019, 2020, 2021  Unsuccessful cardioversion with conversion to rate control strategy, 2023  Echo (8/8/2022): EF 50%, anatomically and functionally normal valves  Watchman implant by Anthony Mcdaniel, 11/28/2023  FARHAD (1/12/2024): LVEF 40%.  27 mm Watchman device well-seated.  No thrombus or periprosthetic flow.  Mild MR but no significant valvular abnormality        Stage 3 chronic kidney disease [N18.30]  Yes     Priority: Low    Sepsis [A41.9]  Yes    Presence of Watchman left atrial appendage closure device [Z95.818]  Yes    Enlarged prostate without lower urinary tract symptoms (luts) [N40.0]  Yes              Breathing normally  Rate controlled atrial fibrillation  Orthostasis upon standing             Vital Sign Min/Max for last 24 hours  Temp  Min: 97.7 °F (36.5 °C)  Max: 97.9 °F (36.6 °C)   BP  Min: 85/54  Max: 137/72   Pulse  Min: 86  Max: 118   Resp  Min: 16  Max: 17   SpO2  Min: 93 %  Max: 95 %   No data recorded      Intake/Output Summary (Last 24 hours) at 1/29/2024 1622  Last data filed at 1/29/2024 1412  Gross per 24 hour   Intake 350 ml   Output 2025 ml   Net  -1675 ml           Physical Exam  Constitutional:       General: He is awake.   Neck:      Vascular: No JVD.   Cardiovascular:      Rate and Rhythm: Normal rate. Rhythm irregularly irregular.      Heart sounds: No murmur heard.  Pulmonary:      Effort: Pulmonary effort is normal.      Breath sounds: Normal breath sounds.   Musculoskeletal:      Right lower leg: No edema.      Left lower leg: No edema.   Skin:     General: Skin is warm and dry.   Neurological:      Mental Status: He is alert and oriented to person, place, and time.   Psychiatric:         Behavior: Behavior is cooperative.         Tele: Rate controlled atrial fibrillation    Results Review (reviewed the patient's recent labs in the electronic medical record):     EKG (1/25/2024): Atrial fibrillation with RVR at 105 bpm    CXR (1/25/2024): No acute cardiopulmonary disease    CT angiogram of the chest (1/3/2024): No PE    ECHO (1/27/2024): LVEF 48%    Results from last 7 days   Lab Units 01/29/24 0447 01/28/24  0421 01/27/24  0351 01/26/24  0413 01/25/24  1552   SODIUM mmol/L 144 139 139 137 136   POTASSIUM mmol/L 3.9 4.0 4.0 3.7 4.6   CHLORIDE mmol/L 107 102 100 99 96*   BUN mg/dL 34* 37* 38* 49* 52*   CREATININE mg/dL 0.86 0.99 0.95 1.14 1.21   MAGNESIUM mg/dL  --  1.7 1.7 1.7 1.7     Results from last 7 days   Lab Units 01/25/24  1813 01/25/24  1552   HSTROP T ng/L 42* 42*     Results from last 7 days   Lab Units 01/29/24 0447 01/28/24  0421 01/26/24  0413   WBC 10*3/mm3 7.85 7.52 6.94   HEMOGLOBIN g/dL 10.6* 11.0* 11.3*   HEMATOCRIT % 31.4* 33.2* 34.6*   PLATELETS 10*3/mm3 197 198 195       Lab Results   Component Value Date    HGBA1C 6.10 (H) 01/29/2024       Lab Results   Component Value Date    CHOL 98 10/30/2023    TRIG 127 10/30/2023    HDL 34 (L) 10/30/2023    LDL 41 10/30/2023              Hypotension  Suspect overtreatment of hypertension and overtreatment with diuretics in addition to UTI  Discontinue valsartan, furosemide, metolazone      Permanent atrial fibrillation  Rate control strategy adopted 2023  Rate presently better controlled  Increase metoprolol to tartrate 100 mg twice daily now that blood pressure improved  Will discontinue digoxin tomorrow if tolerating metoprolol 100 twice daily   No anticoagulation due to watchman.  Continue DAPT     HFmrEF  Recent echo showed mildly reduced LV systolic function (40% on FARHAD, 48% on echo this admission)      UTI/recurrent in nature  Management per hospitalist and infectious disease  History of ESBL  E. coli this admission but not ESBL             Normal saline 100 mg IV bolus over 4 hours now  Increase metoprolol back to 100 mg twice daily  Discontinue digoxin tomorrow if tolerating metoprolol      Electronically signed by Titus Oliveros IV, MD, 01/29/24, 4:26 PM EST.

## 2024-01-29 NOTE — PROGRESS NOTES
[facility] Medicine Services  PROGRESS NOTE    Patient Name: Darien Camarena  : 1935  MRN: [id number]    Date of Admission: 2024  Primary Care Physician: Pito Way MD    Subjective   Subjective     CC:  Dizziness    HPI:  Denies cough or dyspnea.        Objective   Objective     Vital Signs:   Temp:  [97.7 °F (36.5 °C)-97.9 °F (36.6 °C)] 97.7 °F (36.5 °C)  Heart Rate:  [] 118  Resp:  [16-17] 16  BP: ()/(54-90) 130/90     Physical Exam:  NAD, in bed  MM moist  Irregularly irregular  Breath sounds clear bilaterally  Abd soft, NT, ND  No edema  Alert, speech clear  Normal affect      Results Reviewed:  LAB RESULTS:      Lab 24  0447 24  0421 24  0943 24  0413 24  2329 24  20324  1813 24  1552   WBC 7.85 7.52  --  6.94  --   --   --  7.96   HEMOGLOBIN 10.6* 11.0*  --  11.3*  --   --   --  13.0   HEMATOCRIT 31.4* 33.2*  --  34.6*  --   --   --  39.4   PLATELETS 197 198  --  195  --   --   --  243   NEUTROS ABS 5.51 5.04  --   --   --   --   --  5.65   IMMATURE GRANS (ABS) 0.07* 0.11*  --   --   --   --   --  0.05   LYMPHS ABS 1.36 1.43  --   --   --   --   --  1.46   MONOS ABS 0.44 0.57  --   --   --   --   --  0.46   EOS ABS 0.40 0.32  --   --   --   --   --  0.27   MCV 98.4* 97.9*  --  96.6  --   --   --  98.0*   SED RATE  --   --   --   --   --   --   --  124*   CRP  --   --   --   --   --   --  1.18*  --    PROCALCITONIN  --   --   --   --   --   --  0.04  --    LACTATE  --   --  1.2  --  1.9 2.2*  --  2.9*         Lab 24  0447 24  04224  03524  0413 24  1552   SODIUM 144 139 139 137 136   POTASSIUM 3.9 4.0 4.0 3.7 4.6   CHLORIDE 107 102 100 99 96*   CO2 28.0 28.0 31.0* 28.0 27.0   ANION GAP 9.0 9.0 8.0 10.0 13.0   BUN 34* 37* 38* 49* 52*   CREATININE 0.86 0.99 0.95 1.14 1.21   EGFR 83.8 73.7 77.5 62.2 57.9*   GLUCOSE 154* 155* 215* 178* 138*   CALCIUM 9.0 9.2 8.9 8.9  9.3   MAGNESIUM  --  1.7 1.7 1.7 1.7         Lab 01/25/24  1552   TOTAL PROTEIN 7.3   ALBUMIN 3.4*   GLOBULIN 3.9   ALT (SGPT) 38   AST (SGOT) 41*   BILIRUBIN 0.6   ALK PHOS 240*         Lab 01/25/24  1813 01/25/24  1552   PROBNP  --  1,428.0   HSTROP T 42* 42*                 Brief Urine Lab Results  (Last result in the past 365 days)        Color   Clarity   Blood   Leuk Est   Nitrite   Protein   CREAT   Urine HCG        01/25/24 1913 Yellow   Cloudy   Negative   Moderate (2+)   Positive   Negative                   Microbiology Results Abnormal       Procedure Component Value - Date/Time    Blood Culture - Blood, Arm, Left [[id number]]  (Normal) Collected: 01/27/24 0943    Lab Status: Preliminary result Specimen: Blood from Arm, Left Updated: 01/29/24 1000     Blood Culture No growth at 2 days    Blood Culture - Blood, Arm, Right [[id number]]  (Normal) Collected: 01/25/24 2014    Lab Status: Preliminary result Specimen: Blood from Arm, Right Updated: 01/28/24 2045     Blood Culture No growth at 3 days            Adult Transthoracic Echo Limited W/ Cont if Necessary Per Protocol    Result Date: 1/27/2024    Left ventricular systolic function is low normal. Calculated left ventricular EF = 47.8% Left ventricular ejection fraction appears to be 46 - 50%.      Results for orders placed during the hospital encounter of 01/25/24    Adult Transthoracic Echo Limited W/ Cont if Necessary Per Protocol    Interpretation Summary    Left ventricular systolic function is low normal. Calculated left ventricular EF = 47.8% Left ventricular ejection fraction appears to be 46 - 50%.      Current medications:  Scheduled Meds:allopurinol, 300 mg, Oral, Daily  aspirin, 81 mg, Oral, Daily  cefuroxime, 500 mg, Oral, Q12H  clopidogrel, 75 mg, Oral, Daily  [START ON 1/30/2024] digoxin, 125 mcg, Oral, Daily  donepezil, 10 mg, Oral, Nightly  finasteride, 5 mg, Oral, Nightly  heparin (porcine), 5,000 Units, Subcutaneous, Q8H  insulin lispro,  2-7 Units, Subcutaneous, 4x Daily AC & at Bedtime  lactobacillus acidophilus, 1 capsule, Oral, Daily  memantine, 10 mg, Oral, BID  metoprolol tartrate, 25 mg, Oral, BID  midodrine, 5 mg, Oral, TID AC  multivitamin with minerals, 1 tablet, Oral, Nightly  pantoprazole, 40 mg, Oral, Q AM  pravastatin, 40 mg, Oral, Nightly  senna-docusate sodium, 2 tablet, Oral, BID  sertraline, 50 mg, Oral, Daily  sodium chloride, 10 mL, Intravenous, Q12H  tamsulosin, 0.4 mg, Oral, Daily  tiotropium bromide monohydrate, 2 puff, Inhalation, Daily - RT      Continuous Infusions:   PRN Meds:.  acetaminophen    albuterol    senna-docusate sodium **AND** polyethylene glycol **AND** bisacodyl **AND** bisacodyl    Calcium Replacement - Follow Nurse / BPA Driven Protocol    dextrose    dextrose    glucagon (human recombinant)    Magnesium Standard Dose Replacement - Follow Nurse / BPA Driven Protocol    Phosphorus Replacement - Follow Nurse / BPA Driven Protocol    Potassium Replacement - Follow Nurse / BPA Driven Protocol    sodium chloride    sodium chloride    sodium chloride    Assessment & Plan   Assessment & Plan     Active Hospital Problems    Diagnosis  POA    **Hypotension [I95.9]  Yes    Chronic systolic heart failure [I50.22]  Yes    Sepsis [A41.9]  Yes    Recurrent UTI [N39.0]  Yes    Presence of Watchman left atrial appendage closure device [Z95.818]  Yes    Stage 3 chronic kidney disease [N18.30]  Yes    Persistent atrial fibrillation [I48.19]  Yes    Enlarged prostate without lower urinary tract symptoms (luts) [N40.0]  Yes    Gastroesophageal reflux disease with esophagitis [K21.00]  Yes    Hyperlipidemia LDL goal <100 [E78.5]  Yes    Essential hypertension [I10]  Yes    Type 2 diabetes mellitus with stage 3 chronic kidney disease, without long-term current use of insulin [E11.22, N18.30]  Yes    Obstructive sleep apnea syndrome [G47.33]  Yes      Resolved Hospital Problems   No resolved problems to display.        Brief  Hospital Course to date:  Darien Camarena is a 88 y.o. male admitted with symptomatic hypotension (dizziness) and tachycardia.  Recently treated for complicated UTI and noted history of urinary retention requiring self in and out cath.    Symptomatic orthostatic hypotension  Tachycardia  A-fib with Watchman device  Chronic systolic congestive heart failure  -Holding diuretics (lasix and midodrine) and antihypertensives (valsartan 80 mg) -- except metoprolol at lower dose  -Added midodrine 5 mg TID, remains orthostatic however not symptomatic and PT said BP improved with ambulation.  -Cardiology follows    CKD stage III    COPD    Enlarged prostate  Urinary retention  Recent UTI  -ID consulted for management of antibiotics, initially treated with Merrem, now transition to oral Ceftin with plan to continue through February 2, 2024  -In and out cath every 8 and as needed  -Continue Flomax    Diabetes mellitus type 2  -Fingerstick blood sugar range 1 38-2 15  -Hemoglobin A1c was 6.20 in October 23  -Sliding scale insulin as needed    Dementia    Depression    Called and updated daughter Columba.  Reviewed orthostatics and medication changes.  Patient not symptomatic with low BP on standing, but would likely benefit from close supervision by family at discharge home.  Anticipate possible discharge tomorrow 1/30    Expected Discharge Location and Transportation: Discharge home  Expected Discharge   Expected Discharge Date: 1/30/2024; Expected Discharge Time:      DVT prophylaxis:  Medical DVT prophylaxis orders are present.         AM-PAC 6 Clicks Score (PT): 20 (01/29/24 5447)    CODE STATUS:   Code Status and Medical Interventions:   Ordered at: 01/25/24 0997     Code Status (Patient has no pulse and is not breathing):    CPR (Attempt to Resuscitate)     Medical Interventions (Patient has pulse or is breathing):    Full Support       Tyree Jim MD  01/29/24

## 2024-01-29 NOTE — PLAN OF CARE
Goal Outcome Evaluation:  Plan of Care Reviewed With: patient        Progress: no change  Outcome Evaluation: Pt continues to be limited by orthostatic hypotension and weakness. Pt ambulated 100ft with CGA and RW for support. BP drops noted with positional changes but increased after ambulation. Recommend RW for d/c. Continue to progress per pt tolerance.      Anticipated Discharge Disposition (PT): home with outpatient therapy services, home with assist

## 2024-01-29 NOTE — TELEPHONE ENCOUNTER
----- Message from Vanessa Munoz sent at 1/29/2024  3:07 PM EST -----  Can we please reschedule this patients 2/6 appt with Yon or Jayden. Patient was scheduled in error with Dr. Savage. Thanks.         ----- Message -----  From: Charla Boston MA  Sent: 1/29/2024   2:53 PM EST  To: Vanessa Munoz    Patient saw Dr.Penticuff netta Montano but is on our schedule now. Shouldn't he be on ?

## 2024-01-29 NOTE — PROGRESS NOTES
[facility]    INFECTIOUS DISEASE PROGRESS NOTE    Darien Camarena  1935  [id number]    Date of consult: 1/26/2024    Admit date: 1/25/2024    Requesting Provider: Anthony Franklin MD  Evaluating physician: Brandon Philippe MD  Reason for Consultation: Possible recurrent UTI with h/o ESBL, E. coli on urine culture from 1/25  Chief Complaint: Symptoms similar to recent hospitalization on 1/5 with possible UTI.      Subjective   History of present illness:  Patient is a  88 y.o. male, known to Dr. Last Og, with h/o Afib/Watchman device, COPD, T2DM, CKD Stage III, STEVEN/CPAP, HTN, HLD, recurrent UTIs, ESBL, and BPH/urinary retention/self catheterization 4 times a day who was recently treated ESBL E.coli UTI with Invanz for 7 days.  He returned to [facility] ED on 1/25 for lightheadedness, fogginess, and warm sensation from sitting to standing for the past days PTA.  He denies dysuria or hematuria. On arrival, the patient is afebrile with , BP 81/62, and remains on room air with some hypoxia.  Initial labs were , lactic acid 2.9, proBNP 1428, WBC 8000 with 71% neutrophils, creatinine 1.21, CRP 1.15, and PCT 0.04.  A UA WBC was 21-50 with moderate LE and positive nitrite and culture positive for GNR. Blood cultures are pending.  A CXR showed no acute findings.  He is currently on Rocephin.  ID was asked on 1/26 to evaluate and manage his antibiotic therapy.  He has minimal complaints in terms of his urinary tract.  He is feeling better.    1/27/2024 history reviewed.  Tolerating antibiotics with meropenem.  No high fever.  Urine culture with E. coli, not obvious ESBL, resistant to levofloxacin and sulfa as well as ampicillin.  Blood culture positive for staphylococcal species, not lugdunensis or aureus.  Patient has allergy to penicillin but has tolerated cephalosporins.    1/28/2024 history reviewed.  Tolerating cefuroxime for E. coli cystitis.  No high  fevers.    1/29/2024 history reviewed.  Continues on cefuroxime for E. coli cystitis.  No high fever.  No abdominal pain.  Feels well.    Past Medical History:   Diagnosis Date    Arrhythmia     Atrial fibrillation     Bilateral leg cramps     possible new medication related side effects    Chronic kidney disease     Clotting disorder     pt doesnt know about this    COPD (chronic obstructive pulmonary disease)     Coronary artery disease     Diabetes mellitus     doesnt check sugar    E. coli sepsis 06/23/2022    Enlarged prostate without lower urinary tract symptoms (luts) 06/20/2016    Full dentures     GERD (gastroesophageal reflux disease)     Gout     Hearing aid worn     bilat prn    History of colonoscopy 09/12/2012    History of radiation therapy 02/24/2023    SBRT LLL lung    [place] (hard of hearing)     hearing aids prn    Hyperlipidemia     Hypertension     Kidney stone     surgery x1    Lung cancer     STEVEN on CPAP     compliant with machine    PAF (paroxysmal atrial fibrillation)     Prostatism     Sleep apnea     CPAP HS    Urinary incontinence     Urinary tract infection     Wears glasses     readers       Past Surgical History:   Procedure Laterality Date    ATRIAL APPENDAGE EXCLUSION LEFT WITH TRANSESOPHAGEAL ECHOCARDIOGRAM N/A 11/28/2023    Procedure: Atrial Appendage Occlusion;  Surgeon: Anthony Mcdaniel MD;  Location:  [facility];  Service: Cardiovascular;  Laterality: N/A;    CARDIAC CATHETERIZATION Left 09/29/2022    Procedure: Left Heart Cath;  Surgeon: Titus Oliveros IV, MD;  Location:  [facility];  Service: Cardiovascular;  Laterality: Left;    CARDIOVERSION      CATARACT EXTRACTION Bilateral     COLONOSCOPY      CYSTOSCOPY      ENDOSCOPY      possible    KIDNEY STONE SURGERY      x1       Pediatric History   Patient Parents    Not on file     Other Topics Concern    Not on file   Social History Narrative    Caffeine: 1 cup of decaff coffee daily         family history includes Alzheimer's disease in his mother; COPD in his father; Cancer in his father; Kidney disease in his father; Lung cancer in his father; No Known Problems in his daughter, daughter, and daughter.    Allergies   Allergen Reactions    Xarelto [Rivaroxaban] GI Bleeding     GI bleed    Penicillins Hives     Has tolerated cefepime, ceftriaxone, cefazolin, cefdinir, cefuroxime, cephalexin       Immunization History   Administered Date(s) Administered    31-influenza Vac Quardvalent Preservativ 11/01/2018, 10/16/2019    COVID-19 (PFIZER) BIVALENT 12+YRS 12/22/2022    COVID-19 (PFIZER) Purple Cap Monovalent 01/26/2021, 02/19/2021    Fluzone High Dose =>65 Years ([facility] ONLY) 10/01/2016, 09/18/2017, 09/15/2018, 10/12/2019, 09/18/2020, 09/25/2021    Fluzone High-Dose 65+yrs 09/19/2020, 09/25/2021, 12/22/2022, 10/30/2023    Hepatitis A 09/15/2018, 11/01/2018    Pneumococcal Conjugate 13-Valent (PCV13) 10/26/2015    Pneumococcal Polysaccharide (PPSV23) 06/24/2006, 09/18/2020    Pneumococcal, Unspecified 11/30/2006    Shingrix 09/25/2021, 04/15/2023    Td (TDVAX) 06/24/2005    Tdap 06/14/2018       Medication:  @Scheduled Meds:allopurinol, 300 mg, Oral, Daily  aspirin, 81 mg, Oral, Daily  cefuroxime, 500 mg, Oral, Q12H  clopidogrel, 75 mg, Oral, Daily  digoxin, 250 mcg, Oral, Daily  donepezil, 10 mg, Oral, Nightly  finasteride, 5 mg, Oral, Nightly  heparin (porcine), 5,000 Units, Subcutaneous, Q8H  insulin lispro, 2-7 Units, Subcutaneous, 4x Daily AC & at Bedtime  lactobacillus acidophilus, 1 capsule, Oral, Daily  memantine, 10 mg, Oral, BID  metoprolol tartrate, 25 mg, Oral, BID  midodrine, 5 mg, Oral, TID AC  multivitamin with minerals, 1 tablet, Oral, Nightly  pantoprazole, 40 mg, Oral, Q AM  pravastatin, 40 mg, Oral, Nightly  senna-docusate sodium, 2 tablet, Oral, BID  sertraline, 50 mg, Oral, Daily  sodium chloride, 10 mL, Intravenous, Q12H  tamsulosin, 0.4 mg, Oral, Daily  tiotropium bromide  "monohydrate, 2 puff, Inhalation, Daily - RT      Continuous Infusions:   PRN Meds:.  acetaminophen    albuterol    senna-docusate sodium **AND** polyethylene glycol **AND** bisacodyl **AND** bisacodyl    Calcium Replacement - Follow Nurse / BPA Driven Protocol    dextrose    dextrose    glucagon (human recombinant)    Magnesium Standard Dose Replacement - Follow Nurse / BPA Driven Protocol    Phosphorus Replacement - Follow Nurse / BPA Driven Protocol    Potassium Replacement - Follow Nurse / BPA Driven Protocol    sodium chloride    sodium chloride    sodium chloride     Please refer to the medical record for a full medication list    Review of Systems:    Constitutional-- No Fever, chills or sweats.  Appetite decreased, and some malaise. No fatigue.  HEENT-- No new vision, hearing or throat complaints.  No epistaxis or oral sores.  Denies odynophagia or dysphagia. No headache, photophobia or neck stiffness.  CV-- No chest pain, palpitation or syncope  Resp-- No SOB/cough/Hemoptysis  GI- No nausea, vomiting, or diarrhea.  No hematochezia, melena, or hematemesis. Denies jaundice or chronic liver disease.  -- No dysuria, hematuria, or flank pain.  Denies hesitancy, urgency or burning with urination.  Self caths.  Has some back pain prior to arrival, but better now.   Lymph- no swollen lymph nodes in neck/axilla or groin.   Heme- No active bruising or bleeding; no Hx of DVT or PE.  MS-- no swelling or pain in the bones or joints of arms/legs.  No new back pain.  Neuro-- No acute focal weakness or numbness in the arms or legs.  No seizures.  Some dizziness as per HPI.  Skin--No rashes or lesions, no nodules    Physical Exam:   Vital Signs   Temp:  [97.7 °F (36.5 °C)-97.9 °F (36.6 °C)] 97.9 °F (36.6 °C)  Heart Rate:  [] 86  Resp:  [16-18] 16  BP: ()/() 123/75    Blood pressure 123/75, pulse 86, temperature 97.9 °F (36.6 °C), temperature source Oral, resp. rate 16, height 190.5 cm (75\"), weight 112 kg " (246 lb 14.6 oz), SpO2 95%.  GENERAL: Awake and alert, in no acute distress. Appears older than stated age.  Resting in chair.  HEENT:  Normocephalic, atraumatic.  Oropharynx without thrush. Dentition in fair repair. No cervical adenopathy. No neck masses.  Ears externally normal, Nose externally normal.  EYES: No conjunctival injection. No icterus. EOM full.  LYMPHATICS: No lymphadenopathy of the neck or axillary or inguinal regions.   HEART: No murmur, gallop, or pericardial friction rub. Reg rate rhythm  LUNGS: Clear to auscultation and percussion. No respiratory distress, no use of accessory muscles.  No wheezes.  ABDOMEN: Soft, nontender, nondistended. No appreciable HSM.  Bowel sounds normal.  SKIN: Warm and dry without cutaneous eruptions.  No nodules.    PSYCHIATRIC: Mental status lucid. No confusion.  EXT:  No cellulitic change.  NEURO: Oriented to name, nonfocal.      Results Review:   I reviewed the patient's new clinical results.  I reviewed the patient's new imaging results and agree with the interpretation.  I reviewed the patient's other test results and agree with the interpretation    Results from last 7 days   Lab Units 01/29/24  0447 01/28/24  0421 01/26/24  0413   WBC 10*3/mm3 7.85 7.52 6.94   HEMOGLOBIN g/dL 10.6* 11.0* 11.3*   HEMATOCRIT % 31.4* 33.2* 34.6*   PLATELETS 10*3/mm3 197 198 195     Results from last 7 days   Lab Units 01/29/24  0447   SODIUM mmol/L 144   POTASSIUM mmol/L 3.9   CHLORIDE mmol/L 107   CO2 mmol/L 28.0   BUN mg/dL 34*   CREATININE mg/dL 0.86   GLUCOSE mg/dL 154*   CALCIUM mg/dL 9.0     Results from last 7 days   Lab Units 01/25/24  1552   ALK PHOS U/L 240*   BILIRUBIN mg/dL 0.6   ALT (SGPT) U/L 38   AST (SGOT) U/L 41*     Results from last 7 days   Lab Units 01/25/24  1552   SED RATE mm/hr 124*     Results from last 7 days   Lab Units 01/25/24  1813   CRP mg/dL 1.18*         Results from last 7 days   Lab Units 01/27/24  0943   LACTATE mmol/L 1.2     Estimated Creatinine  "Clearance: 81.7 mL/min (by C-G formula based on SCr of 0.86 mg/dL).     Procalitonin Results:      Lab 01/25/24  1813   PROCALCITONIN 0.04      Brief Urine Lab Results  (Last result in the past 365 days)        Color   Clarity   Blood   Leuk Est   Nitrite   Protein   CREAT   Urine HCG        01/25/24 1913 Yellow   Cloudy   Negative   Moderate (2+)   Positive   Negative                  No results found for: \"SITE\", \"ALLENTEST\", \"PHART\", \"ZCT1GKD\", \"PO2ART\", \"TXP9BFR\", \"BASEEXCESS\", \"R7GLNDFO\", \"HGBBG\", \"HCTABG\", \"OXYHEMOGLOBI\", \"METHHGBN\", \"CARBOXYHGB\", \"CO2CT\", \"BAROMETRIC\", \"MODALITY\", \"FIO2\"     Microbiology:  Microbiology Results (last 10 days)       Procedure Component Value - Date/Time    Blood Culture - Blood, Arm, Left [[id number]]  (Normal) Collected: 01/27/24 0943    Lab Status: Preliminary result Specimen: Blood from Arm, Left Updated: 01/28/24 1000     Blood Culture No growth at 24 hours    Blood Culture - Blood, Arm, Left [[id number]]  (Abnormal) Collected: 01/25/24 2014    Lab Status: Final result Specimen: Blood from Arm, Left Updated: 01/28/24 0639     Blood Culture Staphylococcus, coagulase negative     Isolated from Aerobic Bottle     Gram Stain Aerobic Bottle Gram positive cocci in groups    Narrative:      Probable contaminant requires clinical correlation, susceptibility not performed unless requested by physician.      Blood Culture - Blood, Arm, Right [[id number]]  (Normal) Collected: 01/25/24 2014    Lab Status: Preliminary result Specimen: Blood from Arm, Right Updated: 01/28/24 2045     Blood Culture No growth at 3 days    Blood Culture ID, PCR - Blood, Arm, Left [[id number]]  (Abnormal) Collected: 01/25/24 2014    Lab Status: Final result Specimen: Blood from Arm, Left Updated: 01/27/24 0829     BCID, PCR Staph spp, not aureus or lugdunensis. Identification by BCID2 PCR.     BOTTLE TYPE Aerobic Bottle    Urine Culture - Urine, Urine, Clean Catch [[id number]]  (Abnormal)  (Susceptibility) " Collected: 01/25/24 1913    Lab Status: Final result Specimen: Urine, Clean Catch Updated: 01/27/24 0612     Urine Culture >100,000 CFU/mL Escherichia coli    Narrative:      Colonization of the urinary tract without infection is common. Treatment is discouraged unless the patient is symptomatic, pregnant, or undergoing an invasive urologic procedure.    Susceptibility        Escherichia coli      JESSICA      Amikacin Susceptible      Amoxicillin + Clavulanate Susceptible      Ampicillin Resistant      Ampicillin + Sulbactam Intermediate      Cefazolin Susceptible      Cefepime Susceptible      Ceftazidime Susceptible      Ceftriaxone Susceptible      Gentamicin Resistant      Levofloxacin Resistant      Nitrofurantoin Susceptible      Piperacillin + Tazobactam Susceptible      Tobramycin Resistant      Trimethoprim + Sulfamethoxazole Resistant                                       Radiology:  Imaging Results (Last 72 Hours)       ** No results found for the last 72 hours. 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  E. coli, not ESBL from 1/25.  Staphylococcal species bacteremia, new.  Contaminant.  Not toxic.  Benign prostatic hypertrophy with urinary retention/self catheterizations 4 times a day.  Increases risk for recurrent UTI.  Paroxysmal atrial fibrillation/Watchman procedure/Plavix  Type 2 diabetes mellitus, increased risk for infection.  Chronic kidney disease Stage IIIa  Obesity  Essential hypertension  Obstructive sleep apnea/CPAP  Penicillin allergy (hives). Tolerated Rocephin in past.  Tolerating cefuroxime.  Anemia, chronic disease.  Slightly worse.      PLAN:  Diagnostically, continue to follow blood and urine cultures, physical examination, CBC, CMP, CRP.  Therapeutically, should continue with cefuroxime 500 mg p.o. twice daily until 2/2/24 to facilitate outpatient therapy.  Supportive care.  At  risk for deconditioning.    I discussed the patient's findings and my recommendations with patient, nursing staff, and primary care team    Thank you for asking me to see Darien Isidro Camarena.  Our group would be pleased to follow this patient over the course of their hospitalization and assist with outpatient antimicrobial therapy, as indicated.  Further recommendations depend on the results of the cultures and clinical course.  Increased risk for adverse drug reactions, complications of IV access, readmission.    Dr. Og to resume care.  Follow-up should be scheduled with Dr. gO within the week.    Brandon Philippe MD  1/29/2024

## 2024-01-30 ENCOUNTER — READMISSION MANAGEMENT (OUTPATIENT)
Dept: CALL CENTER | Facility: HOSPITAL | Age: 89
End: 2024-01-30
Payer: MEDICARE

## 2024-01-30 VITALS
WEIGHT: 246.91 LBS | SYSTOLIC BLOOD PRESSURE: 106 MMHG | DIASTOLIC BLOOD PRESSURE: 81 MMHG | HEIGHT: 75 IN | OXYGEN SATURATION: 99 % | RESPIRATION RATE: 18 BRPM | TEMPERATURE: 98.2 F | BODY MASS INDEX: 30.7 KG/M2 | HEART RATE: 77 BPM

## 2024-01-30 PROBLEM — I95.9 HYPOTENSION: Status: RESOLVED | Noted: 2024-01-26 | Resolved: 2024-01-30

## 2024-01-30 PROBLEM — A41.9 SEPSIS: Status: RESOLVED | Noted: 2024-01-25 | Resolved: 2024-01-30

## 2024-01-30 LAB
ANION GAP SERPL CALCULATED.3IONS-SCNC: 6 MMOL/L (ref 5–15)
BACTERIA SPEC AEROBE CULT: NORMAL
BUN SERPL-MCNC: 27 MG/DL (ref 8–23)
BUN/CREAT SERPL: 29 (ref 7–25)
CALCIUM SPEC-SCNC: 9 MG/DL (ref 8.6–10.5)
CHLORIDE SERPL-SCNC: 102 MMOL/L (ref 98–107)
CO2 SERPL-SCNC: 29 MMOL/L (ref 22–29)
CREAT SERPL-MCNC: 0.93 MG/DL (ref 0.76–1.27)
EGFRCR SERPLBLD CKD-EPI 2021: 79.5 ML/MIN/1.73
GLUCOSE BLDC GLUCOMTR-MCNC: 185 MG/DL (ref 70–130)
GLUCOSE BLDC GLUCOMTR-MCNC: 197 MG/DL (ref 70–130)
GLUCOSE SERPL-MCNC: 222 MG/DL (ref 65–99)
POTASSIUM SERPL-SCNC: 4.2 MMOL/L (ref 3.5–5.2)
SODIUM SERPL-SCNC: 137 MMOL/L (ref 136–145)

## 2024-01-30 PROCEDURE — 99232 SBSQ HOSP IP/OBS MODERATE 35: CPT | Performed by: NURSE PRACTITIONER

## 2024-01-30 PROCEDURE — 63710000001 INSULIN LISPRO (HUMAN) PER 5 UNITS: Performed by: FAMILY MEDICINE

## 2024-01-30 PROCEDURE — 80048 BASIC METABOLIC PNL TOTAL CA: CPT | Performed by: INTERNAL MEDICINE

## 2024-01-30 PROCEDURE — 82948 REAGENT STRIP/BLOOD GLUCOSE: CPT

## 2024-01-30 PROCEDURE — 94799 UNLISTED PULMONARY SVC/PX: CPT

## 2024-01-30 PROCEDURE — 25010000002 HEPARIN (PORCINE) PER 1000 UNITS: Performed by: STUDENT IN AN ORGANIZED HEALTH CARE EDUCATION/TRAINING PROGRAM

## 2024-01-30 PROCEDURE — 99239 HOSP IP/OBS DSCHRG MGMT >30: CPT | Performed by: NURSE PRACTITIONER

## 2024-01-30 RX ORDER — CEFUROXIME AXETIL 500 MG/1
500 TABLET ORAL EVERY 12 HOURS SCHEDULED
Qty: 7 TABLET | Refills: 0 | Status: SHIPPED | OUTPATIENT
Start: 2024-01-30 | End: 2024-02-03

## 2024-01-30 RX ORDER — DIGOXIN 125 MCG
125 TABLET ORAL DAILY
Qty: 90 TABLET | Refills: 1 | Status: SHIPPED | OUTPATIENT
Start: 2024-01-31

## 2024-01-30 RX ADMIN — INSULIN LISPRO 2 UNITS: 100 INJECTION, SOLUTION INTRAVENOUS; SUBCUTANEOUS at 08:06

## 2024-01-30 RX ADMIN — DIGOXIN 125 MCG: 125 TABLET ORAL at 08:07

## 2024-01-30 RX ADMIN — METOPROLOL TARTRATE 100 MG: 100 TABLET, FILM COATED ORAL at 08:07

## 2024-01-30 RX ADMIN — PANTOPRAZOLE SODIUM 40 MG: 40 TABLET, DELAYED RELEASE ORAL at 05:45

## 2024-01-30 RX ADMIN — SENNOSIDES AND DOCUSATE SODIUM 2 TABLET: 8.6; 5 TABLET ORAL at 08:06

## 2024-01-30 RX ADMIN — ASPIRIN 81 MG: 81 TABLET, COATED ORAL at 08:06

## 2024-01-30 RX ADMIN — CLOPIDOGREL BISULFATE 75 MG: 75 TABLET ORAL at 08:07

## 2024-01-30 RX ADMIN — INSULIN LISPRO 2 UNITS: 100 INJECTION, SOLUTION INTRAVENOUS; SUBCUTANEOUS at 11:37

## 2024-01-30 RX ADMIN — Medication 1 CAPSULE: at 08:07

## 2024-01-30 RX ADMIN — ALLOPURINOL 300 MG: 300 TABLET ORAL at 08:07

## 2024-01-30 RX ADMIN — SERTRALINE HYDROCHLORIDE 50 MG: 50 TABLET ORAL at 08:07

## 2024-01-30 RX ADMIN — HEPARIN SODIUM 5000 UNITS: 5000 INJECTION INTRAVENOUS; SUBCUTANEOUS at 05:45

## 2024-01-30 RX ADMIN — MEMANTINE 10 MG: 10 TABLET ORAL at 08:07

## 2024-01-30 RX ADMIN — TAMSULOSIN HYDROCHLORIDE 0.4 MG: 0.4 CAPSULE ORAL at 08:07

## 2024-01-30 RX ADMIN — CEFUROXIME AXETIL 500 MG: 250 TABLET, FILM COATED ORAL at 08:07

## 2024-01-30 RX ADMIN — TIOTROPIUM BROMIDE INHALATION SPRAY 2 PUFF: 3.12 SPRAY, METERED RESPIRATORY (INHALATION) at 08:12

## 2024-01-30 NOTE — CASE MANAGEMENT/SOCIAL WORK
Continued Stay Note  [facility]     Patient Name: Darien Camarena  MRN: [id number]  Today's Date: 1/30/2024    Admit Date: 1/25/2024    Plan: home   Discharge Plan       Row Name 01/30/24 0934       Plan    Plan home    Patient/Family in Agreement with Plan yes    Plan Comments Met with Mr. Felix at the bedside to discuss discharge plan. His plan is home, and family will transport.  offered an order for outpatient physical therapy, but Mr. Felix reported he already sees a therapist. No other discharge needs were identified.  will continue to follow plan of care and assist with discharge planning needs as indicated.    Final Discharge Disposition Code 01 - home or self-care                   Discharge Codes    No documentation.                 Expected Discharge Date and Time       Expected Discharge Date Expected Discharge Time    Jan 30, 2024               Scarlet Martinez RN

## 2024-01-30 NOTE — DISCHARGE SUMMARY
[facility]  DISCHARGE SUMMARY    Patient Name: Darien Camarena  : 1935  MRN: [id number]    Date of Admission: 2024  3:30 PM  Date of Discharge:  2024  Primary Care Physician: Pito Way MD    Consults       Date and Time Order Name Status Description    2024  9:46 PM Inpatient Infectious Diseases Consult Completed     2024  9:46 PM Inpatient Cardiology Consult Completed     1/3/2024  6:08 PM Inpatient Infectious Diseases Consult Completed             Hospital Course     Presenting Problem: Dizziness    Active Hospital Problems    Diagnosis  POA    Chronic systolic heart failure [I50.22]  Yes    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]  Yes    Recurrent UTI [N39.0]  Yes    Presence of Watchman left atrial appendage closure device [Z95.818]  Yes    Stage 3 chronic kidney disease [N18.30]  Yes    Permanent atrial fibrillation [I48.21]  Yes    Enlarged prostate without lower urinary tract symptoms (luts) [N40.0]  Yes      Resolved Hospital Problems    Diagnosis Date Resolved POA    **Hypotension [I95.9] 2024 Yes    Sepsis [A41.9] 2024 Yes          Hospital Course:  Darien Camarena is a 88 y.o. male admitted with symptomatic hypotension (dizziness) and tachycardia.  He was recently treated for complicated UTI and has a history of urinary retention requiring self in- and-out caths four times a day. Diuretics and valsartan were held on admission and patient was started on midodrine TID for BP support. Dr. Oliveros with cardiology was consulted and made changes to his medications as his symptoms are likely due to overtreatment of hypertension with antihypertensives and diuretics in setting of UTI.  Patient will discontinue valsartan, furosemide, and metolazone.  He also added digoxin to patient's metoprolol for improved rate control for his A-fib.  Patient does not require anticoagulation as he  is status post Watchman procedure.    On admission, patient's urine was consistent with UTI with positive nitrites and 4+ bacteria.  ID was consulted for management of antibiotics.  Patient was initially treated with Merrem and has transition to p.o. Ceftin with a plan to continue treatment through 2/2/24.    Symptomatic orthostatic hypotension  Tachycardia  A-fib with Watchman device  Chronic systolic congestive heart failure  --Echo 1/27/24 with EF 46-50%  --Midodrine discontinued 1/29.  Patient has been normotensive off midodrine.  --Follow up with Dr. Oliveros/BERYL Buitrago with cardiology 3 weeks     Enlarged prostate  Urinary retention  Recent UTI  -Continue Flomax  --Follow up with Dr. Og with ID one week.     Diabetes mellitus type 2  -Hemoglobin A1c was 6.20 in October 23  -Continue home metformin at discharge     CKD stage III  COPD  Dementia  Depression  -Continue home meds    Discharge Follow Up Recommendations for outpatient labs/diagnostics:  --Follow up with PCP one week. Check BMP, CBC.  --Follow up with Dr. Og with [facility] one week.  --Follow up with BERYL Buitrago, or Dr. Oliveros with Cardiology 3 weeks    Day of Discharge     HPI:   Patient resting in bed. NAD. Says he is feeling better. Denies dizziness, weakness, pain. Says he feels he is ready to go home and will continue outpatient PT.     Review of Systems  Gen- No fevers, chills  CV- No chest pain, palpitations  Resp- No cough, dyspnea  GI- No N/V/D, abd pain      Vital Signs:   Temp:  [97.8 °F (36.6 °C)-98.3 °F (36.8 °C)] 98.2 °F (36.8 °C)  Heart Rate:  [] 77  Resp:  [16-18] 18  BP: (106-143)/(79-99) 106/81      Physical Exam:  Constitutional: No acute distress, awake, alert  HENT: NCAT, mucous membranes moist  Respiratory: Clear to auscultation bilaterally, respiratory effort normal, room air  Cardiovascular: Regularly irregular rhythm, no murmurs, rubs, or gallops  Gastrointestinal: Positive bowel sounds, soft, nontender,  nondistended  Musculoskeletal: No bilateral ankle edema  Psychiatric: Appropriate affect, cooperative  Neurologic: Oriented x 3, strength symmetric in all extremities, Cranial Nerves grossly intact to confrontation, speech clear  Skin: No rashes      Pertinent  and/or Most Recent Results     LAB RESULTS:      Lab 01/29/24 0447 01/28/24 0421 01/27/24 0943 01/26/24 0413 01/25/24 2329 01/25/24 2032 01/25/24  1813 01/25/24  1552   WBC 7.85 7.52  --  6.94  --   --   --  7.96   HEMOGLOBIN 10.6* 11.0*  --  11.3*  --   --   --  13.0   HEMATOCRIT 31.4* 33.2*  --  34.6*  --   --   --  39.4   PLATELETS 197 198  --  195  --   --   --  243   NEUTROS ABS 5.51 5.04  --   --   --   --   --  5.65   IMMATURE GRANS (ABS) 0.07* 0.11*  --   --   --   --   --  0.05   LYMPHS ABS 1.36 1.43  --   --   --   --   --  1.46   MONOS ABS 0.44 0.57  --   --   --   --   --  0.46   EOS ABS 0.40 0.32  --   --   --   --   --  0.27   MCV 98.4* 97.9*  --  96.6  --   --   --  98.0*   SED RATE  --   --   --   --   --   --   --  124*   CRP  --   --   --   --   --   --  1.18*  --    PROCALCITONIN  --   --   --   --   --   --  0.04  --    LACTATE  --   --  1.2  --  1.9 2.2*  --  2.9*         Lab 01/30/24 0928 01/29/24 0447 01/28/24 0421 01/27/24 0351 01/26/24 0413 01/25/24  1552   SODIUM 137 144 139 139 137 136   POTASSIUM 4.2 3.9 4.0 4.0 3.7 4.6   CHLORIDE 102 107 102 100 99 96*   CO2 29.0 28.0 28.0 31.0* 28.0 27.0   ANION GAP 6.0 9.0 9.0 8.0 10.0 13.0   BUN 27* 34* 37* 38* 49* 52*   CREATININE 0.93 0.86 0.99 0.95 1.14 1.21   EGFR 79.5 83.8 73.7 77.5 62.2 57.9*   GLUCOSE 222* 154* 155* 215* 178* 138*   CALCIUM 9.0 9.0 9.2 8.9 8.9 9.3   MAGNESIUM  --   --  1.7 1.7 1.7 1.7   HEMOGLOBIN A1C  --  6.10*  --   --   --   --          Lab 01/25/24  1552   TOTAL PROTEIN 7.3   ALBUMIN 3.4*   GLOBULIN 3.9   ALT (SGPT) 38   AST (SGOT) 41*   BILIRUBIN 0.6   ALK PHOS 240*         Lab 01/25/24  1813 01/25/24  1552   PROBNP  --  1,428.0   HSTROP T 42* 42*                  Brief Urine Lab Results  (Last result in the past 365 days)        Color   Clarity   Blood   Leuk Est   Nitrite   Protein   CREAT   Urine HCG        01/25/24 1913 Yellow   Cloudy   Negative   Moderate (2+)   Positive   Negative                 Microbiology Results (last 10 days)       Procedure Component Value - Date/Time    Blood Culture - Blood, Arm, Left [[id number]]  (Normal) Collected: 01/27/24 0943    Lab Status: Preliminary result Specimen: Blood from Arm, Left Updated: 01/30/24 1000     Blood Culture No growth at 3 days    Blood Culture - Blood, Arm, Left [[id number]]  (Abnormal) Collected: 01/25/24 2014    Lab Status: Final result Specimen: Blood from Arm, Left Updated: 01/28/24 0639     Blood Culture Staphylococcus, coagulase negative     Isolated from Aerobic Bottle     Gram Stain Aerobic Bottle Gram positive cocci in groups    Narrative:      Probable contaminant requires clinical correlation, susceptibility not performed unless requested by physician.      Blood Culture - Blood, Arm, Right [[id number]]  (Normal) Collected: 01/25/24 2014    Lab Status: Preliminary result Specimen: Blood from Arm, Right Updated: 01/29/24 2045     Blood Culture No growth at 4 days    Blood Culture ID, PCR - Blood, Arm, Left [[id number]]  (Abnormal) Collected: 01/25/24 2014    Lab Status: Final result Specimen: Blood from Arm, Left Updated: 01/27/24 0829     BCID, PCR Staph spp, not aureus or lugdunensis. Identification by BCID2 PCR.     BOTTLE TYPE Aerobic Bottle    Urine Culture - Urine, Urine, Clean Catch [[id number]]  (Abnormal)  (Susceptibility) Collected: 01/25/24 1913    Lab Status: Final result Specimen: Urine, Clean Catch Updated: 01/27/24 0612     Urine Culture >100,000 CFU/mL Escherichia coli    Narrative:      Colonization of the urinary tract without infection is common. Treatment is discouraged unless the patient is symptomatic, pregnant, or undergoing an invasive urologic procedure.    Susceptibility         Escherichia coli      JESSICA      Amikacin Susceptible      Amoxicillin + Clavulanate Susceptible      Ampicillin Resistant      Ampicillin + Sulbactam Intermediate      Cefazolin Susceptible      Cefepime Susceptible      Ceftazidime Susceptible      Ceftriaxone Susceptible      Gentamicin Resistant      Levofloxacin Resistant      Nitrofurantoin Susceptible      Piperacillin + Tazobactam Susceptible      Tobramycin Resistant      Trimethoprim + Sulfamethoxazole Resistant                                   Adult Transthoracic Echo Limited W/ Cont if Necessary Per Protocol    Result Date: 1/27/2024    Left ventricular systolic function is low normal. Calculated left ventricular EF = 47.8% Left ventricular ejection fraction appears to be 46 - 50%.     XR Chest 1 View    Result Date: 1/25/2024  XR CHEST 1 VW Date of Exam: 1/25/2024 3:34 PM EST Indication: Chest Pain Protocol Comparison: Chest radiograph and chest CT 1/3/2024 Findings: Cardiomediastinal silhouette is unchanged. Similar left hemidiaphragm with left basilar scarring/subsegmental atelectasis. No focal consolidation or overt pulmonary edema. No pleural effusion or pneumothorax. Osseous structures are unchanged.     Impression: No evidence of acute cardiopulmonary disease. Electronically Signed: Surya Lion MD  1/25/2024 3:52 PM EST  Workstation ID: [id number]             Results for orders placed during the hospital encounter of 01/25/24    Adult Transthoracic Echo Limited W/ Cont if Necessary Per Protocol    Interpretation Summary    Left ventricular systolic function is low normal. Calculated left ventricular EF = 47.8% Left ventricular ejection fraction appears to be 46 - 50%.      Plan for Follow-up of Pending Labs/Results:   Pending Labs       Order Current Status    Blood Culture - Blood, Arm, Left Preliminary result    Blood Culture - Blood, Arm, Right Preliminary result          Discharge Details        Discharge Medications        New  Medications        Instructions Start Date   cefuroxime 500 MG tablet  Commonly known as: CEFTIN   500 mg, Oral, Every 12 Hours Scheduled      digoxin 125 MCG tablet  Commonly known as: LANOXIN   125 mcg, Oral, Daily   Start Date: January 31, 2024            Changes to Medications        Instructions Start Date   finasteride 5 MG tablet  Commonly known as: PROSCAR  What changed: when to take this   5 mg, Oral, Every Night at Bedtime      metFORMIN 1000 MG tablet  Commonly known as: GLUCOPHAGE  What changed: when to take this   Take 1 tablet by mouth twice daily      pravastatin 40 MG tablet  Commonly known as: PRAVACHOL  What changed: when to take this   40 mg, Oral, Daily             Continue These Medications        Instructions Start Date   albuterol sulfate  (90 Base) MCG/ACT inhaler  Commonly known as: PROVENTIL HFA;VENTOLIN HFA;PROAIR HFA   2 puffs, Inhalation, Every 4 Hours PRN      allopurinol 300 MG tablet  Commonly known as: ZYLOPRIM   300 mg, Oral, Daily      ascorbic acid 1000 MG tablet  Commonly known as: VITAMIN C   1,000 mg, Oral, 2 Times Daily      aspirin 81 MG EC tablet   81 mg, Oral, Daily, 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.      clopidogrel 75 MG tablet  Commonly known as: PLAVIX   75 mg, Oral, Daily      Coenzyme Q10 300 MG capsule   1 capsule, Oral, Nightly      docusate sodium 100 MG capsule  Commonly known as: COLACE   200 mg, Oral, Nightly PRN      donepezil 10 MG tablet  Commonly known as: Aricept   10 mg, Oral, Nightly      Iron 240 (27 Fe) MG tablet   1 tablet, Oral, Daily      memantine 10 MG tablet  Commonly known as: NAMENDA   10 mg, Oral, 2 Times Daily      metoprolol tartrate 100 MG tablet  Commonly known as: LOPRESSOR   100 mg, Oral, 2 Times Daily      multivitamin with minerals tablet tablet   1 tablet, Oral, Nightly, Centrum Sliver       omeprazole 20 MG capsule  Commonly known as: priLOSEC   20 mg, Oral, Daily      PROBIOTIC ADVANCED PO   1 tablet, Oral, 2 Times  Daily      sertraline 50 MG tablet  Commonly known as: ZOLOFT   Take 1 tablet by mouth once daily      tamsulosin 0.4 MG capsule 24 hr capsule  Commonly known as: FLOMAX   0.4 mg, Oral, Daily      tiotropium bromide-olodaterol 2.5-2.5 MCG/ACT aerosol solution inhaler  Commonly known as: STIOLTO RESPIMAT   2 puffs, Inhalation, Daily, 2 inh once a day             Stop These Medications      furosemide 20 MG tablet  Commonly known as: Lasix     metOLazone 2.5 MG tablet  Commonly known as: ZAROXOLYN     potassium chloride 20 MEQ CR tablet  Commonly known as: K-DUR,KLOR-CON     valsartan 80 MG tablet  Commonly known as: DIOVAN              Allergies   Allergen Reactions    Xarelto [Rivaroxaban] GI Bleeding     GI bleed    Penicillins Hives     Has tolerated cefepime, ceftriaxone, cefazolin, cefdinir, cefuroxime, cephalexin         Discharge Disposition:  Home or Self Care    Diet:  Hospital:  Diet Order   Procedures    Diet: Cardiac Diets, Diabetic Diets; Healthy Heart (2-3 Na+); Consistent Carbohydrate; Texture: Regular Texture (IDDSI 7); Fluid Consistency: Thin (IDDSI 0)       Diet Instructions       Diet: Cardiac Diets, Diabetic Diets; Healthy Heart (2-3 Na+); Regular Texture (IDDSI 7); Thin (IDDSI 0); Consistent Carbohydrate      Discharge Diet:  Cardiac Diets  Diabetic Diets       Cardiac Diet: Healthy Heart (2-3 Na+)    Texture: Regular Texture (IDDSI 7)    Fluid Consistency: Thin (IDDSI 0)    Diabetic Diet: Consistent Carbohydrate             Activity:  Activity Instructions       Activity as Tolerated      Measure Blood Pressure      Measure blood pressure two times a day for one week, once in the morning and once in the evening at 5 PM. Make sure you have been sitting for 5 minutes prior to checking your blood pressure and make sure your arm is at heart level on an armrest or table. Record these numbers and take to your follow-up appointment with your primary care provider and your cardiologist.    Measure  Weight      Measure morning weight daily for one week. Record these numbers and take to your follow-up appointment with your PCP and cardiologist.            Restrictions or Other Recommendations:         CODE STATUS:    Code Status and Medical Interventions:   Ordered at: 01/25/24 2214     Code Status (Patient has no pulse and is not breathing):    CPR (Attempt to Resuscitate)     Medical Interventions (Patient has pulse or is breathing):    Full Support       Future Appointments   Date Time Provider Department Center   2/1/2024  4:00 PM Pito Way MD [facility]   2/8/2024 10:15 AM Jayden Sarkar PA-C [facility]   3/5/2024  1:00 PM Blanquita Ansari APRN [facility]   3/7/2024  1:30 PM Anthony Mcdaniel MD [facility]   4/15/2024 10:00 AM [facility] CT 1 [facility] [place]   4/15/2024  2:00 PM Abbe Leary MD [facility] None   5/31/2024 12:30 PM Haja Henry APRN [facility]   10/29/2024  9:30 AM Titus Oliveros IV, MD [facility]       Additional Instructions for the Follow-ups that You Need to Schedule       Discharge Follow-up with PCP   As directed       Currently Documented PCP:    Pito Way MD    PCP Phone Number:    [phone]     Follow Up Details: One week        Discharge Follow-up with Specified Provider: Dr. Og with [facility]; 1 Week   As directed      To: Dr. Og with [facility]   Follow Up: 1 Week        Discharge Follow-up with Specified Provider: BERYL Buitrago with Dr. Oliveros with Cardiology; 3 Weeks   As directed      To: BERYL Buitrago with Dr. Oliveros with Cardiology   Follow Up: 3 Weeks                BERYL Borja  01/30/24      Time Spent on Discharge:  I spent  45  minutes on this discharge activity which included: face-to-face encounter with the patient, reviewing the data in the system, coordination of the care with the nursing staff as well as consultants, documentation, and entering orders.

## 2024-01-30 NOTE — OUTREACH NOTE
Prep Survey      Flowsheet Row Responses   Congregational facility patient discharged from? [facility]   Is LACE score < 7 ? No   Eligibility [facility]   Date of Admission 01/25/24   Date of Discharge 01/30/24   Discharge Disposition Home or Self Care   Discharge diagnosis Hypotension/Sepsis   Does the patient have one of the following disease processes/diagnoses(primary or secondary)? Sepsis   Does the patient have Home health ordered? No   Is there a DME ordered? No   Prep survey completed? Yes            REI VENCES - Registered Nurse

## 2024-01-30 NOTE — PLAN OF CARE
Goal Outcome Evaluation:  Plan of Care Reviewed With: patient        Progress: improving     Problem: Adult Inpatient Plan of Care  Goal: Plan of Care Review  Outcome: Ongoing, Progressing  Flowsheets (Taken 1/30/2024 0419)  Progress: improving  Plan of Care Reviewed With: patient  Goal: Patient-Specific Goal (Individualized)  Outcome: Ongoing, Progressing  Goal: Absence of Hospital-Acquired Illness or Injury  Outcome: Ongoing, Progressing  Intervention: Identify and Manage Fall Risk  Recent Flowsheet Documentation  Taken 1/30/2024 0417 by Zaida Oliveros, RN  Safety Promotion/Fall Prevention:   assistive device/personal items within reach   activity supervised   clutter free environment maintained   nonskid shoes/slippers when out of bed   room organization consistent   safety round/check completed  Taken 1/30/2024 0217 by Zaida Oliveros, RN  Safety Promotion/Fall Prevention:   activity supervised   assistive device/personal items within reach   clutter free environment maintained   nonskid shoes/slippers when out of bed   room organization consistent   safety round/check completed  Taken 1/30/2024 0030 by Zaida Oliveros, RN  Safety Promotion/Fall Prevention:   activity supervised   assistive device/personal items within reach   clutter free environment maintained   nonskid shoes/slippers when out of bed   room organization consistent   safety round/check completed  Taken 1/29/2024 2223 by Zaida Oliveros, RN  Safety Promotion/Fall Prevention:   activity supervised   assistive device/personal items within reach   clutter free environment maintained   nonskid shoes/slippers when out of bed   room organization consistent   safety round/check completed  Taken 1/29/2024 2000 by Zaida Oliveros, RN  Safety Promotion/Fall Prevention:   activity supervised   assistive device/personal items within reach   clutter free environment maintained   nonskid shoes/slippers when out of bed   room organization  consistent   safety round/check completed  Intervention: Prevent Skin Injury  Recent Flowsheet Documentation  Taken 1/30/2024 0417 by Zaida Oliveros RN  Body Position: position changed independently  Skin Protection:   adhesive use limited   incontinence pads utilized   transparent dressing maintained  Taken 1/30/2024 0217 by Zaida Oliveros RN  Body Position: position changed independently  Skin Protection:   adhesive use limited   incontinence pads utilized  Taken 1/30/2024 0030 by Zaida Oliveros RN  Body Position: position changed independently  Skin Protection:   adhesive use limited   incontinence pads utilized  Taken 1/29/2024 2223 by Zaida Oliveros RN  Body Position: position changed independently  Skin Protection:   adhesive use limited   incontinence pads utilized   transparent dressing maintained  Taken 1/29/2024 2000 by Zaida Oliveros RN  Body Position: position changed independently  Skin Protection:   adhesive use limited   incontinence pads utilized   transparent dressing maintained  Intervention: Prevent and Manage VTE (Venous Thromboembolism) Risk  Recent Flowsheet Documentation  Taken 1/30/2024 0417 by Zaida Oliveros RN  Activity Management: activity encouraged  Taken 1/30/2024 0217 by Zaida Oliveros RN  Activity Management: activity encouraged  Taken 1/30/2024 0030 by Zaida Oliveros RN  Activity Management: activity encouraged  Taken 1/29/2024 2223 by Zaida Oliveros RN  Activity Management: activity encouraged  Taken 1/29/2024 2000 by Zaida Oliveros RN  Activity Management: activity encouraged  VTE Prevention/Management: (See MAR) other (see comments)  Range of Motion: active ROM (range of motion) encouraged  Intervention: Prevent Infection  Recent Flowsheet Documentation  Taken 1/29/2024 2000 by Zaida Oliveros RN  Infection Prevention:   cohorting utilized   environmental surveillance performed   equipment surfaces disinfected   hand hygiene  promoted   personal protective equipment utilized   rest/sleep promoted   single patient room provided   visitors restricted/screened  Goal: Optimal Comfort and Wellbeing  Outcome: Ongoing, Progressing  Intervention: Provide Person-Centered Care  Recent Flowsheet Documentation  Taken 1/29/2024 2000 by Zaida Oliveros RN  Trust Relationship/Rapport:   care explained   choices provided   emotional support provided   questions answered   thoughts/feelings acknowledged  Goal: Readiness for Transition of Care  Outcome: Ongoing, Progressing     Problem: Skin Injury Risk Increased  Goal: Skin Health and Integrity  Outcome: Ongoing, Progressing  Intervention: Optimize Skin Protection  Recent Flowsheet Documentation  Taken 1/30/2024 0417 by Zaida Oliveros RN  Pressure Reduction Techniques: frequent weight shift encouraged  Head of Bed (HOB) Positioning: HOB lowered  Pressure Reduction Devices: pressure-redistributing mattress utilized  Skin Protection:   adhesive use limited   incontinence pads utilized   transparent dressing maintained  Taken 1/30/2024 0217 by Zaida Oliveros RN  Pressure Reduction Techniques: frequent weight shift encouraged  Head of Bed (HOB) Positioning: HOB lowered  Pressure Reduction Devices: pressure-redistributing mattress utilized  Skin Protection:   adhesive use limited   incontinence pads utilized  Taken 1/30/2024 0030 by Zaida Oliveros RN  Pressure Reduction Techniques: frequent weight shift encouraged  Head of Bed (HOB) Positioning: HOB elevated  Pressure Reduction Devices: pressure-redistributing mattress utilized  Skin Protection:   adhesive use limited   incontinence pads utilized  Taken 1/29/2024 2223 by Zaida Oliveros RN  Pressure Reduction Techniques: frequent weight shift encouraged  Head of Bed (HOB) Positioning: HOB lowered  Pressure Reduction Devices: pressure-redistributing mattress utilized  Skin Protection:   adhesive use limited   incontinence pads utilized    transparent dressing maintained  Taken 1/29/2024 2000 by Zaida Oliveros RN  Pressure Reduction Techniques: frequent weight shift encouraged  Head of Bed (HOB) Positioning: HOB elevated  Pressure Reduction Devices: pressure-redistributing mattress utilized  Skin Protection:   adhesive use limited   incontinence pads utilized   transparent dressing maintained     Problem: Fall Injury Risk  Goal: Absence of Fall and Fall-Related Injury  Outcome: Ongoing, Progressing  Intervention: Identify and Manage Contributors  Recent Flowsheet Documentation  Taken 1/30/2024 0417 by Zaida Oliveros RN  Medication Review/Management: medications reviewed  Self-Care Promotion: BADL personal objects within reach  Taken 1/30/2024 0217 by Zaida Oliveros RN  Medication Review/Management: medications reviewed  Self-Care Promotion: BADL personal objects within reach  Taken 1/30/2024 0030 by Zaida Oliveros RN  Medication Review/Management: medications reviewed  Self-Care Promotion: BADL personal objects within reach  Taken 1/29/2024 2223 by Zaida Oliveros RN  Medication Review/Management: medications reviewed  Self-Care Promotion: BADL personal objects within reach  Taken 1/29/2024 2000 by Zaida Oliveros RN  Medication Review/Management: medications reviewed  Self-Care Promotion: BADL personal objects within reach  Intervention: Promote Injury-Free Environment  Recent Flowsheet Documentation  Taken 1/30/2024 0417 by Zaida Oliveros RN  Safety Promotion/Fall Prevention:   assistive device/personal items within reach   activity supervised   clutter free environment maintained   nonskid shoes/slippers when out of bed   room organization consistent   safety round/check completed  Taken 1/30/2024 0217 by Zaida Oliveros RN  Safety Promotion/Fall Prevention:   activity supervised   assistive device/personal items within reach   clutter free environment maintained   nonskid shoes/slippers when out of bed   room  organization consistent   safety round/check completed  Taken 1/30/2024 0030 by Zaida Oliveros, RN  Safety Promotion/Fall Prevention:   activity supervised   assistive device/personal items within reach   clutter free environment maintained   nonskid shoes/slippers when out of bed   room organization consistent   safety round/check completed  Taken 1/29/2024 2223 by Zaida Oliveros, RN  Safety Promotion/Fall Prevention:   activity supervised   assistive device/personal items within reach   clutter free environment maintained   nonskid shoes/slippers when out of bed   room organization consistent   safety round/check completed  Taken 1/29/2024 2000 by Zaida Oliveros, RN  Safety Promotion/Fall Prevention:   activity supervised   assistive device/personal items within reach   clutter free environment maintained   nonskid shoes/slippers when out of bed   room organization consistent   safety round/check completed

## 2024-01-30 NOTE — PROGRESS NOTES
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 Normal LV filling pressure  Echo (8/8/2022): EF 50%, anatomically and functionally normal valves  FARHAD (1/12/2024): LVEF 40%.  27 mm Watchman device well-seated.  No thrombus or periprosthetic flow.  Mild MR but no significant valvular abnormality  Echo (1/27/2024): LVEF 48%      Sepsis [A41.9]  Yes     Priority: High    Recurrent UTI [N39.0]  Yes     Priority: High    Stage 3 chronic kidney disease [N18.30]  Yes     Priority: High    Permanent atrial fibrillation [I48.21]  Yes     Priority: High     Diagnosed 2012.   FARHAD-guided ECV, 8/17/2012  CHADS-VASc 5 (age > 75, CAD, HTN, DM)  Multiple cardioversions, 2018, 2019, 2020, 2021  Unsuccessful cardioversion with conversion to rate control strategy, 2023  Echo (8/8/2022): EF 50%, anatomically and functionally normal valves  Watchman implant by Anthony Mcdaniel, 11/28/2023  FARHAD (1/12/2024): LVEF 40%.  27 mm Watchman device well-seated.  No thrombus or periprosthetic flow.         Presence of Watchman left atrial appendage closure device [Z95.818]  Yes     Priority: Low    Chronic systolic heart failure [I50.22]  Yes    Enlarged prostate without lower urinary tract symptoms (luts) [N40.0]  Yes            Patient sitting on the side of bed breathing easy on room air.  He denies any chest pain or shortness of breath.  He is in rate controlled atrial fibrillation on telemetry.  He received 1 L IV fluids yesterday.  Current blood pressure 106/81.  Last dose of midodrine was yesterday morning.           Vital Sign Min/Max for last 24 hours  Temp  Min: 97.7 °F  (36.5 °C)  Max: 98.3 °F (36.8 °C)   BP  Min: 85/54  Max: 143/99   Pulse  Min: 65  Max: 118   Resp  Min: 16  Max: 16   SpO2  Min: 95 %  Max: 95 %   No data recorded      Intake/Output Summary (Last 24 hours) at 1/30/2024 0750  Last data filed at 1/30/2024 0613  Gross per 24 hour   Intake 1500 ml   Output 1650 ml   Net -150 ml           Physical Exam  Constitutional:       General: He is awake.   Cardiovascular:      Rate and Rhythm: Normal rate. Rhythm irregularly irregular.      Heart sounds: No murmur heard.  Pulmonary:      Effort: Pulmonary effort is normal.      Breath sounds: Normal breath sounds.   Musculoskeletal:      Right lower leg: No edema.      Left lower leg: No edema.   Skin:     General: Skin is warm and dry.   Neurological:      Mental Status: He is alert and oriented to person, place, and time.   Psychiatric:         Behavior: Behavior is cooperative.         Tele: Rate controlled atrial fibrillation     Results Review (reviewed the patient's recent labs in the electronic medical record):      EKG (1/25/2024): Atrial fibrillation with RVR at 105 bpm     CXR (1/25/2024): No acute cardiopulmonary disease     CT angiogram of the chest (1/3/2024): No PE     ECHO (1/27/2024): LVEF 48%    Results from last 7 days   Lab Units 01/29/24 0447 01/28/24 0421 01/27/24  0351 01/26/24  0413 01/25/24  1552   SODIUM mmol/L 144 139 139 137 136   POTASSIUM mmol/L 3.9 4.0 4.0 3.7 4.6   CHLORIDE mmol/L 107 102 100 99 96*   BUN mg/dL 34* 37* 38* 49* 52*   CREATININE mg/dL 0.86 0.99 0.95 1.14 1.21   MAGNESIUM mg/dL  --  1.7 1.7 1.7 1.7     Results from last 7 days   Lab Units 01/25/24  1813 01/25/24  1552   HSTROP T ng/L 42* 42*     Results from last 7 days   Lab Units 01/29/24 0447 01/28/24  0421 01/26/24  0413   WBC 10*3/mm3 7.85 7.52 6.94   HEMOGLOBIN g/dL 10.6* 11.0* 11.3*   HEMATOCRIT % 31.4* 33.2* 34.6*   PLATELETS 10*3/mm3 197 198 195       Lab Results   Component Value Date    HGBA1C 6.10 (H) 01/29/2024        Lab Results   Component Value Date    CHOL 98 10/30/2023    TRIG 127 10/30/2023    HDL 34 (L) 10/30/2023    LDL 41 10/30/2023              Hypotension  Suspect overtreatment of hypertension and overtreatment with diuretics in addition to UTI  Discontinue valsartan, furosemide, metolazone  Midodrine discontinued 1/29/2024  1 L IV fluids 1/29/2024   Current /99     Permanent atrial fibrillation  Rate control strategy adopted 2023  Rate presently better controlled  Increase metoprolol to tartrate 100 mg twice daily now that blood pressure improved  Will discontinue digoxin tomorrow if tolerating metoprolol 100 twice daily   No anticoagulation due to watchman.  Continue DAPT     HFmrEF  Recent echo showed mildly reduced LV systolic function (40% on FARHAD, 48% on echo this admission)  Compensated and asymptomatic      UTI/recurrent in nature  Management per hospitalist and infectious disease  History of ESBL  E. coli this admission but not ESBL             Continue 125 mcg digoxin  Continue metoprolol tartrate 100 mg twice daily.  Prefer short acting tartrate as opposed to succinate for better rate control of atrial fibrillation  Continue DAPT  Stay off valsartan, furosemide and metolazone due to causing hypotension  If blood pressure stays stable with current dose metoprolol and no midodrine today can likely be discharged home  Follow-up with cardiology in 3 weeks after discharge    Electronically signed by BERYL Buitrago, 01/30/24, 7:50 AM EST.

## 2024-01-31 ENCOUNTER — TRANSITIONAL CARE MANAGEMENT TELEPHONE ENCOUNTER (OUTPATIENT)
Dept: CALL CENTER | Facility: HOSPITAL | Age: 89
End: 2024-01-31
Payer: MEDICARE

## 2024-01-31 NOTE — PAYOR COMM NOTE
"Ref# [id number]   Still pending  1/25/24 Admitted  1/30/24 Discharged    Utilization Review  Phone [phone]  Fax [phone]    [place] [id number]       Dairen Camarena \"Isidro\" (88 y.o. Male)       Date of Birth   1935    Social Security Number       Address   [address] [place], [place] [address]    Home Phone   [phone]    MRN   [id number]       Buddhism   Jew    Marital Status                               Admission Date   1/25/24    Admission Type   Emergency    Admitting Provider   Tyree Jim MD    Attending Provider       Department, Room/Bed   [facility] 5G, S561/1       Discharge Date   1/30/2024    Discharge Disposition   Home or Self Care    Discharge Destination                                 Attending Provider: (none)   Allergies: Xarelto [Rivaroxaban], Penicillins    Isolation: None   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), 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), ESBL Klebsiella (06/09/23)   Code Status: Prior    Ht: 190.5 cm (75\")   Wt: 112 kg (246 lb 14.6 oz)    Admission Cmt: None   Principal Problem: Hypotension [I95.9]                   Active Insurance as of 1/25/2024       Primary Coverage       Payor Plan Insurance Group Employer/Plan Group    ANTHEM MEDICARE REPLACEMENT ANTHEM MEDICARE ADVANTAGE [id number]       Payor Plan Address Payor Plan Phone Number Payor Plan Fax Number Effective Dates    [address] [phone]  1/1/2024 - None Entered    [facility] [id number]         Subscriber Name Subscriber Birth Date Member ID       DARIEN CAMARENA 1935 [id number]                     Emergency Contacts        (Rel.) Home Phone Work Phone Mobile Phone    CHERSHAWN (Daughter) [phone] -- [phone]    NEIL QUINTERO (Daughter) [phone] -- [phone]    Dolly Taylor (Daughter) -- -- [phone]                 Physician Progress Notes (last 72 hours)        Blanquita Ansari APRN at " 01/30/24 0750          Cardiology Progress Note      Reason for visit:    Hypotension  Permanent atrial fibrillation  Systolic heart failure    IDENTIFICATION: 88-year-old gentleman who resides in [facility] Hospital Problems    Diagnosis  POA    **Hypotension [I95.9]  Yes     Priority: High    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 Normal LV filling pressure  Echo (8/8/2022): EF 50%, anatomically and functionally normal valves  FARHAD (1/12/2024): LVEF 40%.  27 mm Watchman device well-seated.  No thrombus or periprosthetic flow.  Mild MR but no significant valvular abnormality  Echo (1/27/2024): LVEF 48%      Sepsis [A41.9]  Yes     Priority: High    Recurrent UTI [N39.0]  Yes     Priority: High    Stage 3 chronic kidney disease [N18.30]  Yes     Priority: High    Permanent atrial fibrillation [I48.21]  Yes     Priority: High     Diagnosed 2012.   FARHAD-guided ECV, 8/17/2012  CHADS-VASc 5 (age > 75, CAD, HTN, DM)  Multiple cardioversions, 2018, 2019, 2020, 2021  Unsuccessful cardioversion with conversion to rate control strategy, 2023  Echo (8/8/2022): EF 50%, anatomically and functionally normal valves  Watchman implant by Anthony Mcdaniel, 11/28/2023  FARHAD (1/12/2024): LVEF 40%.  27 mm Watchman device well-seated.  No thrombus or periprosthetic flow.         Presence of Watchman left atrial appendage closure device [Z95.818]  Yes     Priority: Low    Chronic systolic heart failure [I50.22]  Yes    Enlarged prostate without lower urinary tract symptoms (luts) [N40.0]  Yes       Subjective     Patient sitting on the side of bed breathing easy on room air.  He denies any chest pain or shortness of breath.  He is in rate controlled atrial fibrillation on telemetry.  He received 1 L IV fluids yesterday.  Current blood pressure 106/81.  Last dose of midodrine was yesterday morning.        Objective   Vital Sign  Min/Max for last 24 hours  Temp  Min: 97.7 °F (36.5 °C)  Max: 98.3 °F (36.8 °C)   BP  Min: 85/54  Max: 143/99   Pulse  Min: 65  Max: 118   Resp  Min: 16  Max: 16   SpO2  Min: 95 %  Max: 95 %   No data recorded      Intake/Output Summary (Last 24 hours) at 1/30/2024 0750  Last data filed at 1/30/2024 0613  Gross per 24 hour   Intake 1500 ml   Output 1650 ml   Net -150 ml           Physical Exam  Constitutional:       General: He is awake.   Cardiovascular:      Rate and Rhythm: Normal rate. Rhythm irregularly irregular.      Heart sounds: No murmur heard.  Pulmonary:      Effort: Pulmonary effort is normal.      Breath sounds: Normal breath sounds.   Musculoskeletal:      Right lower leg: No edema.      Left lower leg: No edema.   Skin:     General: Skin is warm and dry.   Neurological:      Mental Status: He is alert and oriented to person, place, and time.   Psychiatric:         Behavior: Behavior is cooperative.         Tele: Rate controlled atrial fibrillation     Results Review (reviewed the patient's recent labs in the electronic medical record):      EKG (1/25/2024): Atrial fibrillation with RVR at 105 bpm     CXR (1/25/2024): No acute cardiopulmonary disease     CT angiogram of the chest (1/3/2024): No PE     ECHO (1/27/2024): LVEF 48%    Results from last 7 days   Lab Units 01/29/24 0447 01/28/24  0421 01/27/24  0351 01/26/24  0413 01/25/24  1552   SODIUM mmol/L 144 139 139 137 136   POTASSIUM mmol/L 3.9 4.0 4.0 3.7 4.6   CHLORIDE mmol/L 107 102 100 99 96*   BUN mg/dL 34* 37* 38* 49* 52*   CREATININE mg/dL 0.86 0.99 0.95 1.14 1.21   MAGNESIUM mg/dL  --  1.7 1.7 1.7 1.7     Results from last 7 days   Lab Units 01/25/24  1813 01/25/24  1552   HSTROP T ng/L 42* 42*     Results from last 7 days   Lab Units 01/29/24 0447 01/28/24  0421 01/26/24  0413   WBC 10*3/mm3 7.85 7.52 6.94   HEMOGLOBIN g/dL 10.6* 11.0* 11.3*   HEMATOCRIT % 31.4* 33.2* 34.6*   PLATELETS 10*3/mm3 197 198 195       Lab Results   Component  Value Date    HGBA1C 6.10 (H) 2024       Lab Results   Component Value Date    CHOL 98 10/30/2023    TRIG 127 10/30/2023    HDL 34 (L) 10/30/2023    LDL 41 10/30/2023         Assessment     Hypotension  Suspect overtreatment of hypertension and overtreatment with diuretics in addition to UTI  Discontinue valsartan, furosemide, metolazone  Midodrine discontinued 2024  1 L IV fluids 2024   Current /99     Permanent atrial fibrillation  Rate control strategy adopted   Rate presently better controlled  Increase metoprolol to tartrate 100 mg twice daily now that blood pressure improved  Will discontinue digoxin tomorrow if tolerating metoprolol 100 twice daily   No anticoagulation due to watchman.  Continue DAPT     HFmrEF  Recent echo showed mildly reduced LV systolic function (40% on FARHAD, 48% on echo this admission)  Compensated and asymptomatic      UTI/recurrent in nature  Management per hospitalist and infectious disease  History of ESBL  E. coli this admission but not ESBL        Plan     Continue 125 mcg digoxin  Continue metoprolol tartrate 100 mg twice daily.  Prefer short acting tartrate as opposed to succinate for better rate control of atrial fibrillation  Continue DAPT  Stay off valsartan, furosemide and metolazone due to causing hypotension  If blood pressure stays stable with current dose metoprolol and no midodrine today can likely be discharged home  Follow-up with cardiology in 3 weeks after discharge    Electronically signed by BERYL Buitrago, 24, 7:50 AM EST.    Electronically signed by Blanquita Ansari APRN at 24 1224       Tyree Jim MD at 24 1408              [facility] Medicine Services  PROGRESS NOTE    Patient Name: Darien Camarena  : 1935  MRN: [id number]    Date of Admission: 2024  Primary Care Physician: Pito Way MD    Subjective   Subjective     CC:  Dizziness    HPI:  Denies cough or  dyspnea.        Objective   Objective     Vital Signs:   Temp:  [97.7 °F (36.5 °C)-97.9 °F (36.6 °C)] 97.7 °F (36.5 °C)  Heart Rate:  [] 118  Resp:  [16-17] 16  BP: ()/(54-90) 130/90     Physical Exam:  NAD, in bed  MM moist  Irregularly irregular  Breath sounds clear bilaterally  Abd soft, NT, ND  No edema  Alert, speech clear  Normal affect      Results Reviewed:  LAB RESULTS:      Lab 01/29/24 0447 01/28/24 0421 01/27/24  0943 01/26/24 0413 01/25/24 2329 01/25/24 2032 01/25/24  1813 01/25/24  1552   WBC 7.85 7.52  --  6.94  --   --   --  7.96   HEMOGLOBIN 10.6* 11.0*  --  11.3*  --   --   --  13.0   HEMATOCRIT 31.4* 33.2*  --  34.6*  --   --   --  39.4   PLATELETS 197 198  --  195  --   --   --  243   NEUTROS ABS 5.51 5.04  --   --   --   --   --  5.65   IMMATURE GRANS (ABS) 0.07* 0.11*  --   --   --   --   --  0.05   LYMPHS ABS 1.36 1.43  --   --   --   --   --  1.46   MONOS ABS 0.44 0.57  --   --   --   --   --  0.46   EOS ABS 0.40 0.32  --   --   --   --   --  0.27   MCV 98.4* 97.9*  --  96.6  --   --   --  98.0*   SED RATE  --   --   --   --   --   --   --  124*   CRP  --   --   --   --   --   --  1.18*  --    PROCALCITONIN  --   --   --   --   --   --  0.04  --    LACTATE  --   --  1.2  --  1.9 2.2*  --  2.9*         Lab 01/29/24 0447 01/28/24 0421 01/27/24 0351 01/26/24 0413 01/25/24  1552   SODIUM 144 139 139 137 136   POTASSIUM 3.9 4.0 4.0 3.7 4.6   CHLORIDE 107 102 100 99 96*   CO2 28.0 28.0 31.0* 28.0 27.0   ANION GAP 9.0 9.0 8.0 10.0 13.0   BUN 34* 37* 38* 49* 52*   CREATININE 0.86 0.99 0.95 1.14 1.21   EGFR 83.8 73.7 77.5 62.2 57.9*   GLUCOSE 154* 155* 215* 178* 138*   CALCIUM 9.0 9.2 8.9 8.9 9.3   MAGNESIUM  --  1.7 1.7 1.7 1.7         Lab 01/25/24  1552   TOTAL PROTEIN 7.3   ALBUMIN 3.4*   GLOBULIN 3.9   ALT (SGPT) 38   AST (SGOT) 41*   BILIRUBIN 0.6   ALK PHOS 240*         Lab 01/25/24  1813 01/25/24  1552   PROBNP  --  1,428.0   HSTROP T 42* 42*                 Brief Urine Lab  Results  (Last result in the past 365 days)        Color   Clarity   Blood   Leuk Est   Nitrite   Protein   CREAT   Urine HCG        01/25/24 1913 Yellow   Cloudy   Negative   Moderate (2+)   Positive   Negative                   Microbiology Results Abnormal       Procedure Component Value - Date/Time    Blood Culture - Blood, Arm, Left [[id number]]  (Normal) Collected: 01/27/24 0943    Lab Status: Preliminary result Specimen: Blood from Arm, Left Updated: 01/29/24 1000     Blood Culture No growth at 2 days    Blood Culture - Blood, Arm, Right [[id number]]  (Normal) Collected: 01/25/24 2014    Lab Status: Preliminary result Specimen: Blood from Arm, Right Updated: 01/28/24 2045     Blood Culture No growth at 3 days            Adult Transthoracic Echo Limited W/ Cont if Necessary Per Protocol    Result Date: 1/27/2024    Left ventricular systolic function is low normal. Calculated left ventricular EF = 47.8% Left ventricular ejection fraction appears to be 46 - 50%.      Results for orders placed during the hospital encounter of 01/25/24    Adult Transthoracic Echo Limited W/ Cont if Necessary Per Protocol    Interpretation Summary    Left ventricular systolic function is low normal. Calculated left ventricular EF = 47.8% Left ventricular ejection fraction appears to be 46 - 50%.      Current medications:  Scheduled Meds:allopurinol, 300 mg, Oral, Daily  aspirin, 81 mg, Oral, Daily  cefuroxime, 500 mg, Oral, Q12H  clopidogrel, 75 mg, Oral, Daily  [START ON 1/30/2024] digoxin, 125 mcg, Oral, Daily  donepezil, 10 mg, Oral, Nightly  finasteride, 5 mg, Oral, Nightly  heparin (porcine), 5,000 Units, Subcutaneous, Q8H  insulin lispro, 2-7 Units, Subcutaneous, 4x Daily AC & at Bedtime  lactobacillus acidophilus, 1 capsule, Oral, Daily  memantine, 10 mg, Oral, BID  metoprolol tartrate, 25 mg, Oral, BID  midodrine, 5 mg, Oral, TID AC  multivitamin with minerals, 1 tablet, Oral, Nightly  pantoprazole, 40 mg, Oral, Q  AM  pravastatin, 40 mg, Oral, Nightly  senna-docusate sodium, 2 tablet, Oral, BID  sertraline, 50 mg, Oral, Daily  sodium chloride, 10 mL, Intravenous, Q12H  tamsulosin, 0.4 mg, Oral, Daily  tiotropium bromide monohydrate, 2 puff, Inhalation, Daily - RT      Continuous Infusions:   PRN Meds:.  acetaminophen    albuterol    senna-docusate sodium **AND** polyethylene glycol **AND** bisacodyl **AND** bisacodyl    Calcium Replacement - Follow Nurse / BPA Driven Protocol    dextrose    dextrose    glucagon (human recombinant)    Magnesium Standard Dose Replacement - Follow Nurse / BPA Driven Protocol    Phosphorus Replacement - Follow Nurse / BPA Driven Protocol    Potassium Replacement - Follow Nurse / BPA Driven Protocol    sodium chloride    sodium chloride    sodium chloride    Assessment & Plan   Assessment & Plan     Active Hospital Problems    Diagnosis  POA    **Hypotension [I95.9]  Yes    Chronic systolic heart failure [I50.22]  Yes    Sepsis [A41.9]  Yes    Recurrent UTI [N39.0]  Yes    Presence of Watchman left atrial appendage closure device [Z95.818]  Yes    Stage 3 chronic kidney disease [N18.30]  Yes    Persistent atrial fibrillation [I48.19]  Yes    Enlarged prostate without lower urinary tract symptoms (luts) [N40.0]  Yes    Gastroesophageal reflux disease with esophagitis [K21.00]  Yes    Hyperlipidemia LDL goal <100 [E78.5]  Yes    Essential hypertension [I10]  Yes    Type 2 diabetes mellitus with stage 3 chronic kidney disease, without long-term current use of insulin [E11.22, N18.30]  Yes    Obstructive sleep apnea syndrome [G47.33]  Yes      Resolved Hospital Problems   No resolved problems to display.        Brief Hospital Course to date:  Darien Camarena is a 88 y.o. male admitted with symptomatic hypotension (dizziness) and tachycardia.  Recently treated for complicated UTI and noted history of urinary retention requiring self in and out cath.    Symptomatic orthostatic  hypotension  Tachycardia  A-fib with Watchman device  Chronic systolic congestive heart failure  -Holding diuretics (lasix and midodrine) and antihypertensives (valsartan 80 mg) -- except metoprolol at lower dose  -Added midodrine 5 mg TID, remains orthostatic however not symptomatic and PT said BP improved with ambulation.  -Cardiology follows    CKD stage III    COPD    Enlarged prostate  Urinary retention  Recent UTI  -ID consulted for management of antibiotics, initially treated with Merrem, now transition to oral Ceftin with plan to continue through February 2, 2024  -In and out cath every 8 and as needed  -Continue Flomax    Diabetes mellitus type 2  -Fingerstick blood sugar range 1 38-2 15  -Hemoglobin A1c was 6.20 in October 23  -Sliding scale insulin as needed    Dementia    Depression    Called and updated daughter Columba.  Reviewed orthostatics and medication changes.  Patient not symptomatic with low BP on standing, but would likely benefit from close supervision by family at discharge home.  Anticipate possible discharge tomorrow 1/30    Expected Discharge Location and Transportation: Discharge home  Expected Discharge   Expected Discharge Date: 1/30/2024; Expected Discharge Time:      DVT prophylaxis:  Medical DVT prophylaxis orders are present.         AM-PAC 6 Clicks Score (PT): 20 (01/29/24 4447)    CODE STATUS:   Code Status and Medical Interventions:   Ordered at: 01/25/24 7546     Code Status (Patient has no pulse and is not breathing):    CPR (Attempt to Resuscitate)     Medical Interventions (Patient has pulse or is breathing):    Full Support       Tyree Jim MD  01/29/24        Electronically signed by Tyree Jim MD at 01/29/24 1341       Titus Oliveros IV, MD at 01/29/24 7900          Cardiology Progress Note      Reason for visit:    Hypotension  Permanent atrial fibrillation  HFEF    IDENTIFICATION: 88-year-old gentleman who resides in [place], [place]    Active  Hospital Problems    Diagnosis  POA    **Hypotension [I95.9]  Yes     Priority: High    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  27 mm Watchman device well-seated.  No thrombus or periprosthetic flow.  Mild MR but no significant valvular abnormality  Echo (1/27/2024): LVEF 48%      Recurrent UTI [N39.0]  Yes     Priority: Medium    Permanent atrial fibrillation [I48.21]  Yes     Priority: Medium     Diagnosed 2012.   FARHAD-guided ECV, 8/17/2012  CHADS-VASc 5 (age > 75, CAD, HTN, DM)  Multiple cardioversions, 2018, 2019, 2020, 2021  Unsuccessful cardioversion with conversion to rate control strategy, 2023  Echo (8/8/2022): EF 50%, anatomically and functionally normal valves  Watchman implant by Anthony Mcdaniel, 11/28/2023  FARHAD (1/12/2024): LVEF 40%.  27 mm Watchman device well-seated.  No thrombus or periprosthetic flow.  Mild MR but no significant valvular abnormality        Stage 3 chronic kidney disease [N18.30]  Yes     Priority: Low    Sepsis [A41.9]  Yes    Presence of Watchman left atrial appendage closure device [Z95.818]  Yes    Enlarged prostate without lower urinary tract symptoms (luts) [N40.0]  Yes       Subjective       Breathing normally  Rate controlled atrial fibrillation  Orthostasis upon standing          Objective   Vital Sign Min/Max for last 24 hours  Temp  Min: 97.7 °F (36.5 °C)  Max: 97.9 °F (36.6 °C)   BP  Min: 85/54  Max: 137/72   Pulse  Min: 86  Max: 118   Resp  Min: 16  Max: 17   SpO2  Min: 93 %  Max: 95 %   No data recorded      Intake/Output Summary (Last 24 hours) at 1/29/2024 1622  Last data filed at 1/29/2024 1412  Gross per 24 hour   Intake 350 ml   Output 2025 ml   Net -1675 ml           Physical Exam  Constitutional:       General: He is awake.   Neck:      Vascular: No JVD.   Cardiovascular:      Rate and Rhythm: Normal rate. Rhythm  irregularly irregular.      Heart sounds: No murmur heard.  Pulmonary:      Effort: Pulmonary effort is normal.      Breath sounds: Normal breath sounds.   Musculoskeletal:      Right lower leg: No edema.      Left lower leg: No edema.   Skin:     General: Skin is warm and dry.   Neurological:      Mental Status: He is alert and oriented to person, place, and time.   Psychiatric:         Behavior: Behavior is cooperative.         Tele: Rate controlled atrial fibrillation    Results Review (reviewed the patient's recent labs in the electronic medical record):     EKG (1/25/2024): Atrial fibrillation with RVR at 105 bpm    CXR (1/25/2024): No acute cardiopulmonary disease    CT angiogram of the chest (1/3/2024): No PE    ECHO (1/27/2024): LVEF 48%    Results from last 7 days   Lab Units 01/29/24 0447 01/28/24  0421 01/27/24  0351 01/26/24  0413 01/25/24  1552   SODIUM mmol/L 144 139 139 137 136   POTASSIUM mmol/L 3.9 4.0 4.0 3.7 4.6   CHLORIDE mmol/L 107 102 100 99 96*   BUN mg/dL 34* 37* 38* 49* 52*   CREATININE mg/dL 0.86 0.99 0.95 1.14 1.21   MAGNESIUM mg/dL  --  1.7 1.7 1.7 1.7     Results from last 7 days   Lab Units 01/25/24  1813 01/25/24  1552   HSTROP T ng/L 42* 42*     Results from last 7 days   Lab Units 01/29/24 0447 01/28/24  0421 01/26/24  0413   WBC 10*3/mm3 7.85 7.52 6.94   HEMOGLOBIN g/dL 10.6* 11.0* 11.3*   HEMATOCRIT % 31.4* 33.2* 34.6*   PLATELETS 10*3/mm3 197 198 195       Lab Results   Component Value Date    HGBA1C 6.10 (H) 01/29/2024       Lab Results   Component Value Date    CHOL 98 10/30/2023    TRIG 127 10/30/2023    HDL 34 (L) 10/30/2023    LDL 41 10/30/2023         Assessment     Hypotension  Suspect overtreatment of hypertension and overtreatment with diuretics in addition to UTI  Discontinue valsartan, furosemide, metolazone     Permanent atrial fibrillation  Rate control strategy adopted 2023  Rate presently better controlled  Increase metoprolol to tartrate 100 mg twice daily now  that blood pressure improved  Will discontinue digoxin tomorrow if tolerating metoprolol 100 twice daily   No anticoagulation due to watchman.  Continue DAPT     HFmrEF  Recent echo showed mildly reduced LV systolic function (40% on FARHAD, 48% on echo this admission)      UTI/recurrent in nature  Management per hospitalist and infectious disease  History of ESBL  E. coli this admission but not ESBL        Plan     Normal saline 100 mg IV bolus over 4 hours now  Increase metoprolol back to 100 mg twice daily  Discontinue digoxin tomorrow if tolerating metoprolol      Electronically signed by Titus GARCIA. Domo DENNIS MD, 01/29/24, 4:26 PM EST.      Electronically signed by Titus Oliveros IV, MD at 01/29/24 1644       Brandon Philippe MD at 01/29/24 0745              [facility]    INFECTIOUS DISEASE PROGRESS NOTE    Darien Camarena  1935  [id number]    Date of consult: 1/26/2024    Admit date: 1/25/2024    Requesting Provider: Anthoyn Franklin MD  Evaluating physician: Brandon Philippe MD  Reason for Consultation: Possible recurrent UTI with h/o ESBL, E. coli on urine culture from 1/25  Chief Complaint: Symptoms similar to recent hospitalization on 1/5 with possible UTI.      Subjective   History of present illness:  Patient is a  88 y.o. male, known to Dr. Last Og, with h/o Afib/Watchman device, COPD, T2DM, CKD Stage III, STEVEN/CPAP, HTN, HLD, recurrent UTIs, ESBL, and BPH/urinary retention/self catheterization 4 times a day who was recently treated ESBL E.coli UTI with Invanz for 7 days.  He returned to [facility] ED on 1/25 for lightheadedness, fogginess, and warm sensation from sitting to standing for the past days PTA.  He denies dysuria or hematuria. On arrival, the patient is afebrile with , BP 81/62, and remains on room air with some hypoxia.  Initial labs were , lactic acid 2.9, proBNP 1428, WBC 8000 with 71% neutrophils, creatinine 1.21, CRP 1.15,  and PCT 0.04.  A UA WBC was 21-50 with moderate LE and positive nitrite and culture positive for GNR. Blood cultures are pending.  A CXR showed no acute findings.  He is currently on Rocephin.  ID was asked on 1/26 to evaluate and manage his antibiotic therapy.  He has minimal complaints in terms of his urinary tract.  He is feeling better.    1/27/2024 history reviewed.  Tolerating antibiotics with meropenem.  No high fever.  Urine culture with E. coli, not obvious ESBL, resistant to levofloxacin and sulfa as well as ampicillin.  Blood culture positive for staphylococcal species, not lugdunensis or aureus.  Patient has allergy to penicillin but has tolerated cephalosporins.    1/28/2024 history reviewed.  Tolerating cefuroxime for E. coli cystitis.  No high fevers.    1/29/2024 history reviewed.  Continues on cefuroxime for E. coli cystitis.  No high fever.  No abdominal pain.  Feels well.    Past Medical History:   Diagnosis Date    Arrhythmia     Atrial fibrillation     Bilateral leg cramps     possible new medication related side effects    Chronic kidney disease     Clotting disorder     pt doesnt know about this    COPD (chronic obstructive pulmonary disease)     Coronary artery disease     Diabetes mellitus     doesnt check sugar    E. coli sepsis 06/23/2022    Enlarged prostate without lower urinary tract symptoms (luts) 06/20/2016    Full dentures     GERD (gastroesophageal reflux disease)     Gout     Hearing aid worn     bilat prn    History of colonoscopy 09/12/2012    History of radiation therapy 02/24/2023    SBRT LLL lung    Jena (hard of hearing)     hearing aids prn    Hyperlipidemia     Hypertension     Kidney stone     surgery x1    Lung cancer     STEVEN on CPAP     compliant with machine    PAF (paroxysmal atrial fibrillation)     Prostatism     Sleep apnea     CPAP HS    Urinary incontinence     Urinary tract infection     Wears glasses     readers       Past Surgical History:   Procedure  Laterality Date    ATRIAL APPENDAGE EXCLUSION LEFT WITH TRANSESOPHAGEAL ECHOCARDIOGRAM N/A 11/28/2023    Procedure: Atrial Appendage Occlusion;  Surgeon: Anthony Mcdaniel MD;  Location:  [facility];  Service: Cardiovascular;  Laterality: N/A;    CARDIAC CATHETERIZATION Left 09/29/2022    Procedure: Left Heart Cath;  Surgeon: Titus Oliveros IV, MD;  Location:  [facility];  Service: Cardiovascular;  Laterality: Left;    CARDIOVERSION      CATARACT EXTRACTION Bilateral     COLONOSCOPY      CYSTOSCOPY      ENDOSCOPY      possible    KIDNEY STONE SURGERY      x1       Pediatric History   Patient Parents    Not on file     Other Topics Concern    Not on file   Social History Narrative    Caffeine: 1 cup of decaff coffee daily        family history includes Alzheimer's disease in his mother; COPD in his father; Cancer in his father; Kidney disease in his father; Lung cancer in his father; No Known Problems in his daughter, daughter, and daughter.    Allergies   Allergen Reactions    Xarelto [Rivaroxaban] GI Bleeding     GI bleed    Penicillins Hives     Has tolerated cefepime, ceftriaxone, cefazolin, cefdinir, cefuroxime, cephalexin       Immunization History   Administered Date(s) Administered    31-influenza Vac Quardvalent Preservativ 11/01/2018, 10/16/2019    COVID-19 (PFIZER) BIVALENT 12+YRS 12/22/2022    COVID-19 (PFIZER) Purple Cap Monovalent 01/26/2021, 02/19/2021    Fluzone High Dose =>65 Years ([facility] ONLY) 10/01/2016, 09/18/2017, 09/15/2018, 10/12/2019, 09/18/2020, 09/25/2021    Fluzone High-Dose 65+yrs 09/19/2020, 09/25/2021, 12/22/2022, 10/30/2023    Hepatitis A 09/15/2018, 11/01/2018    Pneumococcal Conjugate 13-Valent (PCV13) 10/26/2015    Pneumococcal Polysaccharide (PPSV23) 06/24/2006, 09/18/2020    Pneumococcal, Unspecified 11/30/2006    Shingrix 09/25/2021, 04/15/2023    Td (TDVAX) 06/24/2005    Tdap 06/14/2018       Medication:  @Scheduled Meds:allopurinol, 300  mg, Oral, Daily  aspirin, 81 mg, Oral, Daily  cefuroxime, 500 mg, Oral, Q12H  clopidogrel, 75 mg, Oral, Daily  digoxin, 250 mcg, Oral, Daily  donepezil, 10 mg, Oral, Nightly  finasteride, 5 mg, Oral, Nightly  heparin (porcine), 5,000 Units, Subcutaneous, Q8H  insulin lispro, 2-7 Units, Subcutaneous, 4x Daily AC & at Bedtime  lactobacillus acidophilus, 1 capsule, Oral, Daily  memantine, 10 mg, Oral, BID  metoprolol tartrate, 25 mg, Oral, BID  midodrine, 5 mg, Oral, TID AC  multivitamin with minerals, 1 tablet, Oral, Nightly  pantoprazole, 40 mg, Oral, Q AM  pravastatin, 40 mg, Oral, Nightly  senna-docusate sodium, 2 tablet, Oral, BID  sertraline, 50 mg, Oral, Daily  sodium chloride, 10 mL, Intravenous, Q12H  tamsulosin, 0.4 mg, Oral, Daily  tiotropium bromide monohydrate, 2 puff, Inhalation, Daily - RT      Continuous Infusions:   PRN Meds:.  acetaminophen    albuterol    senna-docusate sodium **AND** polyethylene glycol **AND** bisacodyl **AND** bisacodyl    Calcium Replacement - Follow Nurse / BPA Driven Protocol    dextrose    dextrose    glucagon (human recombinant)    Magnesium Standard Dose Replacement - Follow Nurse / BPA Driven Protocol    Phosphorus Replacement - Follow Nurse / BPA Driven Protocol    Potassium Replacement - Follow Nurse / BPA Driven Protocol    sodium chloride    sodium chloride    sodium chloride     Please refer to the medical record for a full medication list    Review of Systems:    Constitutional-- No Fever, chills or sweats.  Appetite decreased, and some malaise. No fatigue.  HEENT-- No new vision, hearing or throat complaints.  No epistaxis or oral sores.  Denies odynophagia or dysphagia. No headache, photophobia or neck stiffness.  CV-- No chest pain, palpitation or syncope  Resp-- No SOB/cough/Hemoptysis  GI- No nausea, vomiting, or diarrhea.  No hematochezia, melena, or hematemesis. Denies jaundice or chronic liver disease.  -- No dysuria, hematuria, or flank pain.  Denies  "hesitancy, urgency or burning with urination.  Self caths.  Has some back pain prior to arrival, but better now.   Lymph- no swollen lymph nodes in neck/axilla or groin.   Heme- No active bruising or bleeding; no Hx of DVT or PE.  MS-- no swelling or pain in the bones or joints of arms/legs.  No new back pain.  Neuro-- No acute focal weakness or numbness in the arms or legs.  No seizures.  Some dizziness as per HPI.  Skin--No rashes or lesions, no nodules    Physical Exam:   Vital Signs   Temp:  [97.7 °F (36.5 °C)-97.9 °F (36.6 °C)] 97.9 °F (36.6 °C)  Heart Rate:  [] 86  Resp:  [16-18] 16  BP: ()/() 123/75    Blood pressure 123/75, pulse 86, temperature 97.9 °F (36.6 °C), temperature source Oral, resp. rate 16, height 190.5 cm (75\"), weight 112 kg (246 lb 14.6 oz), SpO2 95%.  GENERAL: Awake and alert, in no acute distress. Appears older than stated age.  Resting in chair.  HEENT:  Normocephalic, atraumatic.  Oropharynx without thrush. Dentition in fair repair. No cervical adenopathy. No neck masses.  Ears externally normal, Nose externally normal.  EYES: No conjunctival injection. No icterus. EOM full.  LYMPHATICS: No lymphadenopathy of the neck or axillary or inguinal regions.   HEART: No murmur, gallop, or pericardial friction rub. Reg rate rhythm  LUNGS: Clear to auscultation and percussion. No respiratory distress, no use of accessory muscles.  No wheezes.  ABDOMEN: Soft, nontender, nondistended. No appreciable HSM.  Bowel sounds normal.  SKIN: Warm and dry without cutaneous eruptions.  No nodules.    PSYCHIATRIC: Mental status lucid. No confusion.  EXT:  No cellulitic change.  NEURO: Oriented to name, nonfocal.      Results Review:   I reviewed the patient's new clinical results.  I reviewed the patient's new imaging results and agree with the interpretation.  I reviewed the patient's other test results and agree with the interpretation    Results from last 7 days   Lab Units 01/29/24  3668 " "01/28/24  0421 01/26/24  0413   WBC 10*3/mm3 7.85 7.52 6.94   HEMOGLOBIN g/dL 10.6* 11.0* 11.3*   HEMATOCRIT % 31.4* 33.2* 34.6*   PLATELETS 10*3/mm3 197 198 195     Results from last 7 days   Lab Units 01/29/24  0447   SODIUM mmol/L 144   POTASSIUM mmol/L 3.9   CHLORIDE mmol/L 107   CO2 mmol/L 28.0   BUN mg/dL 34*   CREATININE mg/dL 0.86   GLUCOSE mg/dL 154*   CALCIUM mg/dL 9.0     Results from last 7 days   Lab Units 01/25/24  1552   ALK PHOS U/L 240*   BILIRUBIN mg/dL 0.6   ALT (SGPT) U/L 38   AST (SGOT) U/L 41*     Results from last 7 days   Lab Units 01/25/24  1552   SED RATE mm/hr 124*     Results from last 7 days   Lab Units 01/25/24  1813   CRP mg/dL 1.18*         Results from last 7 days   Lab Units 01/27/24  0943   LACTATE mmol/L 1.2     Estimated Creatinine Clearance: 81.7 mL/min (by C-G formula based on SCr of 0.86 mg/dL).     Procalitonin Results:      Lab 01/25/24  1813   PROCALCITONIN 0.04      Brief Urine Lab Results  (Last result in the past 365 days)        Color   Clarity   Blood   Leuk Est   Nitrite   Protein   CREAT   Urine HCG        01/25/24 1913 Yellow   Cloudy   Negative   Moderate (2+)   Positive   Negative                  No results found for: \"SITE\", \"ALLENTEST\", \"PHART\", \"USI7JWQ\", \"PO2ART\", \"IFX6TNG\", \"BASEEXCESS\", \"M7MHXWWF\", \"HGBBG\", \"HCTABG\", \"OXYHEMOGLOBI\", \"METHHGBN\", \"CARBOXYHGB\", \"CO2CT\", \"BAROMETRIC\", \"MODALITY\", \"FIO2\"     Microbiology:  Microbiology Results (last 10 days)       Procedure Component Value - Date/Time    Blood Culture - Blood, Arm, Left [[id number]]  (Normal) Collected: 01/27/24 0943    Lab Status: Preliminary result Specimen: Blood from Arm, Left Updated: 01/28/24 1000     Blood Culture No growth at 24 hours    Blood Culture - Blood, Arm, Left [[id number]]  (Abnormal) Collected: 01/25/24 2014    Lab Status: Final result Specimen: Blood from Arm, Left Updated: 01/28/24 0639     Blood Culture Staphylococcus, coagulase negative     Isolated from Aerobic Bottle     " Gram Stain Aerobic Bottle Gram positive cocci in groups    Narrative:      Probable contaminant requires clinical correlation, susceptibility not performed unless requested by physician.      Blood Culture - Blood, Arm, Right [[id number]]  (Normal) Collected: 01/25/24 2014    Lab Status: Preliminary result Specimen: Blood from Arm, Right Updated: 01/28/24 2045     Blood Culture No growth at 3 days    Blood Culture ID, PCR - Blood, Arm, Left [[id number]]  (Abnormal) Collected: 01/25/24 2014    Lab Status: Final result Specimen: Blood from Arm, Left Updated: 01/27/24 0829     BCID, PCR Staph spp, not aureus or lugdunensis. Identification by BCID2 PCR.     BOTTLE TYPE Aerobic Bottle    Urine Culture - Urine, Urine, Clean Catch [[id number]]  (Abnormal)  (Susceptibility) Collected: 01/25/24 1913    Lab Status: Final result Specimen: Urine, Clean Catch Updated: 01/27/24 0612     Urine Culture >100,000 CFU/mL Escherichia coli    Narrative:      Colonization of the urinary tract without infection is common. Treatment is discouraged unless the patient is symptomatic, pregnant, or undergoing an invasive urologic procedure.    Susceptibility        Escherichia coli      JESSICA      Amikacin Susceptible      Amoxicillin + Clavulanate Susceptible      Ampicillin Resistant      Ampicillin + Sulbactam Intermediate      Cefazolin Susceptible      Cefepime Susceptible      Ceftazidime Susceptible      Ceftriaxone Susceptible      Gentamicin Resistant      Levofloxacin Resistant      Nitrofurantoin Susceptible      Piperacillin + Tazobactam Susceptible      Tobramycin Resistant      Trimethoprim + Sulfamethoxazole Resistant                                       Radiology:  Imaging Results (Last 72 Hours)       ** No results found for the last 72 hours. 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  E. coli, not ESBL from  .  Staphylococcal species bacteremia, new.  Contaminant.  Not toxic.  Benign prostatic hypertrophy with urinary retention/self catheterizations 4 times a day.  Increases risk for recurrent UTI.  Paroxysmal atrial fibrillation/Watchman procedure/Plavix  Type 2 diabetes mellitus, increased risk for infection.  Chronic kidney disease Stage IIIa  Obesity  Essential hypertension  Obstructive sleep apnea/CPAP  Penicillin allergy (hives). Tolerated Rocephin in past.  Tolerating cefuroxime.  Anemia, chronic disease.  Slightly worse.      PLAN:  Diagnostically, continue to follow blood and urine cultures, physical examination, CBC, CMP, CRP.  Therapeutically, should continue with cefuroxime 500 mg p.o. twice daily until 24 to facilitate outpatient therapy.  Supportive care.  At risk for deconditioning.    I discussed the patient's findings and my recommendations with patient, nursing staff, and primary care team    Thank you for asking me to see Darien Camarena.  Our group would be pleased to follow this patient over the course of their hospitalization and assist with outpatient antimicrobial therapy, as indicated.  Further recommendations depend on the results of the cultures and clinical course.  Increased risk for adverse drug reactions, complications of IV access, readmission.    Dr. Og to resume care.  Follow-up should be scheduled with Dr. Og within the week.    Brandon Philippe MD  2024      Electronically signed by Brandon Philippe MD at 24 1206       Valeria Wilkins MD at 24 [facility]4              [facility] Medicine Services  PROGRESS NOTE    Patient Name: Darien Camarena  : 1935  MRN: [id number]    Date of Admission: 2024  Primary Care Physician: Pito Way MD    Subjective   Subjective     CC:  Dizziness    HPI:   - Patient is an 88-year-old who presented to the emergency department with complaints of dizziness and  hypotension.  He was also recently treated for UTI.  Given history of A-fib cardiology was consulted and medications adjusted.  Today he is feeling better and denies current dizziness.  Blood pressure noted to still be low less than 100 systolic.  Tachycardia also noted.    1/27 -patient with positive blood culture gram-positive cocci, possibly contaminant.  Repeat blood cultures requested.  Blood pressure is improved today and patient feels less dizzy.  Infectious disease changed antibiotics to Merrem anticipated through 2-2-24.  Urine culture growing E. coli.    1/28 -persistent orthostasis today with blood pressure dropping to the 70s systolic with standing.  Also noted patient was transiently in A-fib with RVR and heart rate in the 130s.  Cardiology is following.  Infectious disease changed to oral antibiotics.      Objective   Objective     Vital Signs:   Temp:  [97.8 °F (36.6 °C)-98.2 °F (36.8 °C)] 97.8 °F (36.6 °C)  Heart Rate:  [] 82  Resp:  [17-18] 17  BP: ()/() 130/85     Physical Exam:  Constitutional: No acute distress, awake, alert  HENT: NCAT, mucous membranes moist  Respiratory: Clear to auscultation bilaterally, respiratory effort normal   Cardiovascular: Regular rate and rhythm, orthostatic blood pressures noted with systolic dropping to the 70s with standing (systolic was 126 when sitting)  Gastrointestinal: Positive bowel sounds, soft, nontender, nondistended  Musculoskeletal: No bilateral ankle edema  Psychiatric: Appropriate affect, cooperative  Neurologic: Oriented x 3, strength symmetric in all extremities, Cranial Nerves grossly intact to confrontation, speech clear  Skin: No rashes      Results Reviewed:  LAB RESULTS:      Lab 01/28/24  0421 01/27/24  0943 01/26/24  0413 01/25/24  2329 01/25/24  2032 01/25/24  1813 01/25/24  1552   WBC 7.52  --  6.94  --   --   --  7.96   HEMOGLOBIN 11.0*  --  11.3*  --   --   --  13.0   HEMATOCRIT 33.2*  --  34.6*  --   --   --  39.4    PLATELETS 198  --  195  --   --   --  243   NEUTROS ABS 5.04  --   --   --   --   --  5.65   IMMATURE GRANS (ABS) 0.11*  --   --   --   --   --  0.05   LYMPHS ABS 1.43  --   --   --   --   --  1.46   MONOS ABS 0.57  --   --   --   --   --  0.46   EOS ABS 0.32  --   --   --   --   --  0.27   MCV 97.9*  --  96.6  --   --   --  98.0*   SED RATE  --   --   --   --   --   --  124*   CRP  --   --   --   --   --  1.18*  --    PROCALCITONIN  --   --   --   --   --  0.04  --    LACTATE  --  1.2  --  1.9 2.2*  --  2.9*         Lab 01/28/24  0421 01/27/24  0351 01/26/24  0413 01/25/24  1552   SODIUM 139 139 137 136   POTASSIUM 4.0 4.0 3.7 4.6   CHLORIDE 102 100 99 96*   CO2 28.0 31.0* 28.0 27.0   ANION GAP 9.0 8.0 10.0 13.0   BUN 37* 38* 49* 52*   CREATININE 0.99 0.95 1.14 1.21   EGFR 73.7 77.5 62.2 57.9*   GLUCOSE 155* 215* 178* 138*   CALCIUM 9.2 8.9 8.9 9.3   MAGNESIUM 1.7 1.7 1.7 1.7         Lab 01/25/24  1552   TOTAL PROTEIN 7.3   ALBUMIN 3.4*   GLOBULIN 3.9   ALT (SGPT) 38   AST (SGOT) 41*   BILIRUBIN 0.6   ALK PHOS 240*         Lab 01/25/24  1813 01/25/24  1552   PROBNP  --  1,428.0   HSTROP T 42* 42*                 Brief Urine Lab Results  (Last result in the past 365 days)        Color   Clarity   Blood   Leuk Est   Nitrite   Protein   CREAT   Urine HCG        01/25/24 1913 Yellow   Cloudy   Negative   Moderate (2+)   Positive   Negative                   Microbiology Results Abnormal       Procedure Component Value - Date/Time    Blood Culture - Blood, Arm, Left [[id number]]  (Normal) Collected: 01/27/24 0943    Lab Status: Preliminary result Specimen: Blood from Arm, Left Updated: 01/28/24 1000     Blood Culture No growth at 24 hours    Blood Culture - Blood, Arm, Right [[id number]]  (Normal) Collected: 01/25/24 2014    Lab Status: Preliminary result Specimen: Blood from Arm, Right Updated: 01/27/24 2045     Blood Culture No growth at 2 days            Adult Transthoracic Echo Limited W/ Cont if Necessary Per  Protocol    Result Date: 1/27/2024    Left ventricular systolic function is low normal. Calculated left ventricular EF = 47.8% Left ventricular ejection fraction appears to be 46 - 50%.      Results for orders placed during the hospital encounter of 01/25/24    Adult Transthoracic Echo Limited W/ Cont if Necessary Per Protocol    Interpretation Summary    Left ventricular systolic function is low normal. Calculated left ventricular EF = 47.8% Left ventricular ejection fraction appears to be 46 - 50%.      Current medications:  Scheduled Meds:allopurinol, 300 mg, Oral, Daily  aspirin, 81 mg, Oral, Daily  cefuroxime, 500 mg, Oral, Q12H  clopidogrel, 75 mg, Oral, Daily  digoxin, 250 mcg, Oral, Daily  donepezil, 10 mg, Oral, Nightly  finasteride, 5 mg, Oral, Nightly  heparin (porcine), 5,000 Units, Subcutaneous, Q8H  insulin lispro, 2-7 Units, Subcutaneous, 4x Daily AC & at Bedtime  lactobacillus acidophilus, 1 capsule, Oral, Daily  memantine, 10 mg, Oral, BID  metoprolol tartrate, 12.5 mg, Oral, BID  midodrine, 5 mg, Oral, TID AC  multivitamin with minerals, 1 tablet, Oral, Nightly  pantoprazole, 40 mg, Oral, Q AM  pravastatin, 40 mg, Oral, Nightly  senna-docusate sodium, 2 tablet, Oral, BID  sertraline, 50 mg, Oral, Daily  sodium chloride, 10 mL, Intravenous, Q12H  tamsulosin, 0.4 mg, Oral, Daily  tiotropium bromide monohydrate, 2 puff, Inhalation, Daily - RT      Continuous Infusions:   PRN Meds:.  acetaminophen    albuterol    senna-docusate sodium **AND** polyethylene glycol **AND** bisacodyl **AND** bisacodyl    Calcium Replacement - Follow Nurse / BPA Driven Protocol    dextrose    dextrose    glucagon (human recombinant)    Magnesium Standard Dose Replacement - Follow Nurse / BPA Driven Protocol    Phosphorus Replacement - Follow Nurse / BPA Driven Protocol    Potassium Replacement - Follow Nurse / BPA Driven Protocol    sodium chloride    sodium chloride    sodium chloride    Assessment & Plan   Assessment &  Plan     Active Hospital Problems    Diagnosis  POA    **Hypotension [I95.9]  Yes    Low left ventricular ejection fraction [R94.30]  Yes    Sepsis [A41.9]  Yes    Recurrent UTI [N39.0]  Yes    Presence of Watchman left atrial appendage closure device [Z95.818]  Yes    Stage 3 chronic kidney disease [N18.30]  Yes    Persistent atrial fibrillation [I48.19]  Yes    Enlarged prostate without lower urinary tract symptoms (luts) [N40.0]  Yes    Gastroesophageal reflux disease with esophagitis [K21.00]  Yes    Hyperlipidemia LDL goal <100 [E78.5]  Yes    Essential hypertension [I10]  Yes    Type 2 diabetes mellitus with stage 3 chronic kidney disease, without long-term current use of insulin [E11.22, N18.30]  Yes    Obstructive sleep apnea syndrome [G47.33]  Yes      Resolved Hospital Problems   No resolved problems to display.        Brief Hospital Course to date:  Darien Camarena is a 88 y.o. male admitted with symptomatic hypotension (dizziness) and tachycardia.  Recently treated for complicated UTI and noted history of urinary retention requiring self in and out cath.    Symptomatic orthostatic hypotension  Tachycardia  A-fib with Watchman device  Chronic systolic congestive heart failure  -Holding diuretics and antihypertensives except metoprolol at lower dose  -Added midodrine  -Cardiology consulted  -persistent orthostasis with a 48 point drop in systolic blood pressure when going from sitting to standing  -Consider fluids or increasing midodrine    CKD stage III    COPD    Enlarged prostate  Urinary retention  Recent UTI  -ID consulted for management of antibiotics, initially treated with Merrem, now transition to oral Ceftin with plan to continue through February 2, 2024  -In and out cath every 8 and as needed  -Continue Flomax    Diabetes mellitus type 2  -Fingerstick blood sugar range 1 38-2 15  -Hemoglobin A1c was 6.20 in October 23  -Sliding scale insulin as needed    Dementia    Depression        Expected  Discharge Location and Transportation: Discharge home  Expected Discharge   Expected Discharge Date: 1/29/2024; Expected Discharge Time:      DVT prophylaxis:  Medical DVT prophylaxis orders are present.         AM-PAC 6 Clicks Score (PT): 19 (01/28/24 0800)    CODE STATUS:   Code Status and Medical Interventions:   Ordered at: 01/25/24 2849     Code Status (Patient has no pulse and is not breathing):    CPR (Attempt to Resuscitate)     Medical Interventions (Patient has pulse or is breathing):    Full Support       Valeria Wilkins MD  01/28/24        Electronically signed by Valeria Wilkins MD at 01/28/24 4537       Brandon Philippe MD at 01/28/24 1056              [facility]    INFECTIOUS DISEASE PROGRESS NOTE    Darien Camarena  1935  [id number]    Date of consult: 1/26/2024    Admit date: 1/25/2024    Requesting Provider: Anthony Franklin MD  Evaluating physician: Brandon Philippe MD  Reason for Consultation: Possible recurrent UTI with h/o ESBL, E. coli on urine culture from 1/25  Chief Complaint: Symptoms similar to recent hospitalization on 1/5 with possible UTI.      Subjective   History of present illness:  Patient is a  88 y.o. male, known to Dr. Last Og, with h/o Afib/Watchman device, COPD, T2DM, CKD Stage III, STEVEN/CPAP, HTN, HLD, recurrent UTIs, ESBL, and BPH/urinary retention/self catheterization 4 times a day who was recently treated ESBL E.coli UTI with Invanz for 7 days.  He returned to [facility] ED on 1/25 for lightheadedness, fogginess, and warm sensation from sitting to standing for the past days PTA.  He denies dysuria or hematuria. On arrival, the patient is afebrile with , BP 81/62, and remains on room air with some hypoxia.  Initial labs were , lactic acid 2.9, proBNP 1428, WBC 8000 with 71% neutrophils, creatinine 1.21, CRP 1.15, and PCT 0.04.  A UA WBC was 21-50 with moderate LE and positive nitrite and culture positive for GNR. Blood  cultures are pending.  A CXR showed no acute findings.  He is currently on Rocephin.  ID was asked on 1/26 to evaluate and manage his antibiotic therapy.  He has minimal complaints in terms of his urinary tract.  He is feeling better.    1/27/2024 history reviewed.  Tolerating antibiotics with meropenem.  No high fever.  Urine culture with E. coli, not obvious ESBL, resistant to levofloxacin and sulfa as well as ampicillin.  Blood culture positive for staphylococcal species, not lugdunensis or aureus.  Patient has allergy to penicillin but has tolerated cephalosporins.    1/28/2024 history reviewed.  Tolerating cefuroxime for E. coli cystitis.  No high fevers.    Past Medical History:   Diagnosis Date    Arrhythmia     Atrial fibrillation     Bilateral leg cramps     possible new medication related side effects    Chronic kidney disease     Clotting disorder     pt doesnt know about this    COPD (chronic obstructive pulmonary disease)     Coronary artery disease     Diabetes mellitus     doesnt check sugar    E. coli sepsis 06/23/2022    Enlarged prostate without lower urinary tract symptoms (luts) 06/20/2016    Full dentures     GERD (gastroesophageal reflux disease)     Gout     Hearing aid worn     bilat prn    History of colonoscopy 09/12/2012    History of radiation therapy 02/24/2023    SBRT LLL lung    [place] (hard of hearing)     hearing aids prn    Hyperlipidemia     Hypertension     Kidney stone     surgery x1    Lung cancer     STEVEN on CPAP     compliant with machine    PAF (paroxysmal atrial fibrillation)     Prostatism     Sleep apnea     CPAP HS    Urinary incontinence     Urinary tract infection     Wears glasses     readers       Past Surgical History:   Procedure Laterality Date    ATRIAL APPENDAGE EXCLUSION LEFT WITH TRANSESOPHAGEAL ECHOCARDIOGRAM N/A 11/28/2023    Procedure: Atrial Appendage Occlusion;  Surgeon: Anthony Mcdaniel MD;  Location: [facility] INVASIVE LOCATION;  Service: Cardiovascular;   Laterality: N/A;    CARDIAC CATHETERIZATION Left 09/29/2022    Procedure: Left Heart Cath;  Surgeon: Titus Oliveros IV, MD;  Location: [facility] CATH INVASIVE LOCATION;  Service: Cardiovascular;  Laterality: Left;    CARDIOVERSION      CATARACT EXTRACTION Bilateral     COLONOSCOPY      CYSTOSCOPY      ENDOSCOPY      possible    KIDNEY STONE SURGERY      x1       Pediatric History   Patient Parents    Not on file     Other Topics Concern    Not on file   Social History Narrative    Caffeine: 1 cup of decaff coffee daily        family history includes Alzheimer's disease in his mother; COPD in his father; Cancer in his father; Kidney disease in his father; Lung cancer in his father; No Known Problems in his daughter, daughter, and daughter.    Allergies   Allergen Reactions    Xarelto [Rivaroxaban] GI Bleeding     GI bleed    Penicillins Hives     Has tolerated cefepime, ceftriaxone, cefazolin, cefdinir, cefuroxime, cephalexin       Immunization History   Administered Date(s) Administered    31-influenza Vac Quardvalent Preservativ 11/01/2018, 10/16/2019    COVID-19 (PFIZER) BIVALENT 12+YRS 12/22/2022    COVID-19 (PFIZER) Purple Cap Monovalent 01/26/2021, 02/19/2021    Fluzone High Dose =>65 Years ([facility] ONLY) 10/01/2016, 09/18/2017, 09/15/2018, 10/12/2019, 09/18/2020, 09/25/2021    Fluzone High-Dose 65+yrs 09/19/2020, 09/25/2021, 12/22/2022, 10/30/2023    Hepatitis A 09/15/2018, 11/01/2018    Pneumococcal Conjugate 13-Valent (PCV13) 10/26/2015    Pneumococcal Polysaccharide (PPSV23) 06/24/2006, 09/18/2020    Pneumococcal, Unspecified 11/30/2006    Shingrix 09/25/2021, 04/15/2023    Td (TDVAX) 06/24/2005    Tdap 06/14/2018       Medication:  @Scheduled Meds:allopurinol, 300 mg, Oral, Daily  aspirin, 81 mg, Oral, Daily  cefuroxime, 500 mg, Oral, Q12H  clopidogrel, 75 mg, Oral, Daily  digoxin, 250 mcg, Oral, Daily  donepezil, 10 mg, Oral, Nightly  finasteride, 5 mg, Oral, Nightly  heparin (porcine), 5,000  Units, Subcutaneous, Q8H  insulin lispro, 2-7 Units, Subcutaneous, 4x Daily AC & at Bedtime  lactobacillus acidophilus, 1 capsule, Oral, Daily  magnesium sulfate, 4 g, Intravenous, Once  memantine, 10 mg, Oral, BID  metoprolol tartrate, 12.5 mg, Oral, BID  midodrine, 5 mg, Oral, TID AC  multivitamin with minerals, 1 tablet, Oral, Nightly  pantoprazole, 40 mg, Oral, Q AM  pravastatin, 40 mg, Oral, Nightly  senna-docusate sodium, 2 tablet, Oral, BID  sertraline, 50 mg, Oral, Daily  sodium chloride, 10 mL, Intravenous, Q12H  tamsulosin, 0.4 mg, Oral, Daily  tiotropium bromide monohydrate, 2 puff, Inhalation, Daily - RT      Continuous Infusions:   PRN Meds:.  acetaminophen    albuterol    senna-docusate sodium **AND** polyethylene glycol **AND** bisacodyl **AND** bisacodyl    Calcium Replacement - Follow Nurse / BPA Driven Protocol    dextrose    dextrose    glucagon (human recombinant)    Magnesium Standard Dose Replacement - Follow Nurse / BPA Driven Protocol    Phosphorus Replacement - Follow Nurse / BPA Driven Protocol    Potassium Replacement - Follow Nurse / BPA Driven Protocol    sodium chloride    sodium chloride    sodium chloride     Please refer to the medical record for a full medication list    Review of Systems:    Constitutional-- No Fever, chills or sweats.  Appetite decreased, and some malaise. No fatigue.  HEENT-- No new vision, hearing or throat complaints.  No epistaxis or oral sores.  Denies odynophagia or dysphagia. No headache, photophobia or neck stiffness.  CV-- No chest pain, palpitation or syncope  Resp-- No SOB/cough/Hemoptysis  GI- No nausea, vomiting, or diarrhea.  No hematochezia, melena, or hematemesis. Denies jaundice or chronic liver disease.  -- No dysuria, hematuria, or flank pain.  Denies hesitancy, urgency or burning with urination.  Self caths.  Has some back pain prior to arrival, but better now.   Lymph- no swollen lymph nodes in neck/axilla or groin.   Heme- No active  "bruising or bleeding; no Hx of DVT or PE.  MS-- no swelling or pain in the bones or joints of arms/legs.  No new back pain.  Neuro-- No acute focal weakness or numbness in the arms or legs.  No seizures.  Some dizziness as per HPI.  Skin--No rashes or lesions    Physical Exam:   Vital Signs   Temp:  [97.7 °F (36.5 °C)-98.2 °F (36.8 °C)] 97.8 °F (36.6 °C)  Heart Rate:  [] 92  Resp:  [17-18] 17  BP: ()/() 78/53    Blood pressure (!) 78/53, pulse 92, temperature 97.8 °F (36.6 °C), temperature source Oral, resp. rate 17, height 190.5 cm (75\"), weight 112 kg (246 lb 14.6 oz), SpO2 91%.  GENERAL: Awake and alert, in no acute distress. Appears older than stated age.  Resting in chair.  HEENT:  Normocephalic, atraumatic.  Oropharynx without thrush. Dentition in fair repair. No cervical adenopathy. No neck masses.  Ears externally normal, Nose externally normal.  EYES: No conjunctival injection. No icterus. EOM full.  LYMPHATICS: No lymphadenopathy of the neck or axillary or inguinal regions.   HEART: No murmur, gallop, or pericardial friction rub. Reg rate rhythm  LUNGS: Clear to auscultation and percussion. No respiratory distress, no use of accessory muscles.  ABDOMEN: Soft, nontender, nondistended. No appreciable HSM.  Bowel sounds normal.  SKIN: Warm and dry without cutaneous eruptions.  No nodules.    PSYCHIATRIC: Mental status lucid. No confusion.  EXT:  No cellulitic change.  NEURO: Oriented to name, nonfocal.      Results Review:   I reviewed the patient's new clinical results.  I reviewed the patient's new imaging results and agree with the interpretation.  I reviewed the patient's other test results and agree with the interpretation    Results from last 7 days   Lab Units 01/28/24  0421 01/26/24  0413 01/25/24  1552   WBC 10*3/mm3 7.52 6.94 7.96   HEMOGLOBIN g/dL 11.0* 11.3* 13.0   HEMATOCRIT % 33.2* 34.6* 39.4   PLATELETS 10*3/mm3 198 195 243     Results from last 7 days   Lab Units " "01/28/24  0421   SODIUM mmol/L 139   POTASSIUM mmol/L 4.0   CHLORIDE mmol/L 102   CO2 mmol/L 28.0   BUN mg/dL 37*   CREATININE mg/dL 0.99   GLUCOSE mg/dL 155*   CALCIUM mg/dL 9.2     Results from last 7 days   Lab Units 01/25/24  1552   ALK PHOS U/L 240*   BILIRUBIN mg/dL 0.6   ALT (SGPT) U/L 38   AST (SGOT) U/L 41*     Results from last 7 days   Lab Units 01/25/24  1552   SED RATE mm/hr 124*     Results from last 7 days   Lab Units 01/25/24  1813   CRP mg/dL 1.18*         Results from last 7 days   Lab Units 01/27/24  0943   LACTATE mmol/L 1.2     Estimated Creatinine Clearance: 71 mL/min (by C-G formula based on SCr of 0.99 mg/dL).     Procalitonin Results:      Lab 01/25/24  1813   PROCALCITONIN 0.04      Brief Urine Lab Results  (Last result in the past 365 days)        Color   Clarity   Blood   Leuk Est   Nitrite   Protein   CREAT   Urine HCG        01/25/24 1913 Yellow   Cloudy   Negative   Moderate (2+)   Positive   Negative                  No results found for: \"SITE\", \"ALLENTEST\", \"PHART\", \"YGB2MNZ\", \"PO2ART\", \"EDI8UQJ\", \"BASEEXCESS\", \"D0SRXEOT\", \"HGBBG\", \"HCTABG\", \"OXYHEMOGLOBI\", \"METHHGBN\", \"CARBOXYHGB\", \"CO2CT\", \"BAROMETRIC\", \"MODALITY\", \"FIO2\"     Microbiology:  Microbiology Results (last 10 days)       Procedure Component Value - Date/Time    Blood Culture - Blood, Arm, Left [[id number]]  (Normal) Collected: 01/27/24 0943    Lab Status: Preliminary result Specimen: Blood from Arm, Left Updated: 01/28/24 1000     Blood Culture No growth at 24 hours    Blood Culture - Blood, Arm, Left [[id number]]  (Abnormal) Collected: 01/25/24 2014    Lab Status: Final result Specimen: Blood from Arm, Left Updated: 01/28/24 0639     Blood Culture Staphylococcus, coagulase negative     Isolated from Aerobic Bottle     Gram Stain Aerobic Bottle Gram positive cocci in groups    Narrative:      Probable contaminant requires clinical correlation, susceptibility not performed unless requested by physician.      Blood Culture " - Blood, Arm, Right [[id number]]  (Normal) Collected: 01/25/24 2014    Lab Status: Preliminary result Specimen: Blood from Arm, Right Updated: 01/27/24 2045     Blood Culture No growth at 2 days    Blood Culture ID, PCR - Blood, Arm, Left [[id number]]  (Abnormal) Collected: 01/25/24 2014    Lab Status: Final result Specimen: Blood from Arm, Left Updated: 01/27/24 0829     BCID, PCR Staph spp, not aureus or lugdunensis. Identification by BCID2 PCR.     BOTTLE TYPE Aerobic Bottle    Urine Culture - Urine, Urine, Clean Catch [[id number]]  (Abnormal)  (Susceptibility) Collected: 01/25/24 1913    Lab Status: Final result Specimen: Urine, Clean Catch Updated: 01/27/24 0612     Urine Culture >100,000 CFU/mL Escherichia coli    Narrative:      Colonization of the urinary tract without infection is common. Treatment is discouraged unless the patient is symptomatic, pregnant, or undergoing an invasive urologic procedure.    Susceptibility        Escherichia coli      JESSICA      Amikacin Susceptible      Amoxicillin + Clavulanate Susceptible      Ampicillin Resistant      Ampicillin + Sulbactam Intermediate      Cefazolin Susceptible      Cefepime Susceptible      Ceftazidime Susceptible      Ceftriaxone Susceptible      Gentamicin Resistant      Levofloxacin Resistant      Nitrofurantoin Susceptible      Piperacillin + Tazobactam Susceptible      Tobramycin Resistant      Trimethoprim + Sulfamethoxazole Resistant                                       Radiology:  Imaging Results (Last 72 Hours)       Procedure Component Value Units Date/Time    XR Chest 1 View [[id number]] Collected: 01/25/24 1550     Updated: 01/25/24 1555    Narrative:      XR CHEST 1 VW    Date of Exam: 1/25/2024 3:34 PM EST    Indication: Chest Pain Protocol    Comparison: Chest radiograph and chest CT 1/3/2024    Findings:  Cardiomediastinal silhouette is unchanged. Similar left hemidiaphragm with left basilar scarring/subsegmental atelectasis. No focal  consolidation or overt pulmonary edema. No pleural effusion or pneumothorax. Osseous structures are unchanged.      Impression:      Impression:  No evidence of acute cardiopulmonary disease.       Electronically Signed: Surya Lion MD    1/25/2024 3:52 PM EST    Workstation ID: [id number]            IMPRESSION:   Recurrent GNR acute bacterial cystitis with h/o ESBL, not surprising giving the recurrent self cath for urinary retention.  E. coli, not ESBL from 1/25.  Staphylococcal species bacteremia, new.  Contaminant.  Not toxic.  Benign prostatic hypertrophy with urinary retention/self catheterizations 4 times a day.  Increases risk for recurrent UTI.  Paroxysmal atrial fibrillation/Watchman procedure/Plavix  Type 2 diabetes mellitus, increased risk for infection.  Chronic kidney disease Stage IIIa  Obesity  Essential hypertension  Obstructive sleep apnea/CPAP  Penicillin allergy (hives). Tolerated Rocephin in past.   Anemia, chronic disease.  Minimally worse.      PLAN:  Diagnostically, continue to follow blood and urine cultures, physical examination, imaging, CBC, CMP, CRP.  Therapeutically, continue cefuroxime 500 mg p.o. twice daily until 2/2/24 to facilitate outpatient therapy.  Supportive care.  At risk for deconditioning.    I discussed the patient's findings and my recommendations with patient, nursing staff, and primary care team    Thank you for asking me to see Darien Isidro Camarena.  Our group would be pleased to follow this patient over the course of their hospitalization and assist with outpatient antimicrobial therapy, as indicated.  Further recommendations depend on the results of the cultures and clinical course.  Increased risk for adverse drug reactions, complications of IV access, readmission.    See next on Monday, call sooner if needed.  Follow-up should be scheduled with Dr. Og within the week.    Brandon Philippe MD  1/28/2024      Electronically signed by Brandon Philippe MD at  24 1423       Consult Notes (last 72 hours)  Notes from 24 1050 through 24 1050   No notes of this type exist for this encounter.          Discharge Summary        AnaangelRossana APRN at 24 1252              [facility] Medicine Services  DISCHARGE SUMMARY    Patient Name: Darien Camarena  : 1935  MRN: [id number]    Date of Admission: 2024  3:30 PM  Date of Discharge:  2024  Primary Care Physician: Pito Way MD    Consults       Date and Time Order Name Status Description    2024  9:46 PM Inpatient Infectious Diseases Consult Completed     2024  9:46 PM Inpatient Cardiology Consult Completed     1/3/2024  6:08 PM Inpatient Infectious Diseases Consult Completed             Hospital Course     Presenting Problem: Dizziness    Active Hospital Problems    Diagnosis  POA    Chronic systolic heart failure [I50.22]  Yes    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]  Yes    Recurrent UTI [N39.0]  Yes    Presence of Watchman left atrial appendage closure device [Z95.818]  Yes    Stage 3 chronic kidney disease [N18.30]  Yes    Permanent atrial fibrillation [I48.21]  Yes    Enlarged prostate without lower urinary tract symptoms (luts) [N40.0]  Yes      Resolved Hospital Problems    Diagnosis Date Resolved POA    **Hypotension [I95.9] 2024 Yes    Sepsis [A41.9] 2024 Yes          Hospital Course:  Darien Camarena is a 88 y.o. male admitted with symptomatic hypotension (dizziness) and tachycardia.  He was recently treated for complicated UTI and has a history of urinary retention requiring self in- and-out caths four times a day. Diuretics and valsartan were held on admission and patient was started on midodrine TID for BP support. Dr. Oliveros with cardiology was consulted and made changes to his medications as his symptoms are likely due to overtreatment of hypertension with antihypertensives  and diuretics in setting of UTI.  Patient will discontinue valsartan, furosemide, and metolazone.  He also added digoxin to patient's metoprolol for improved rate control for his A-fib.  Patient does not require anticoagulation as he is status post Watchman procedure.    On admission, patient's urine was consistent with UTI with positive nitrites and 4+ bacteria.  ID was consulted for management of antibiotics.  Patient was initially treated with Merrem and has transition to p.o. Ceftin with a plan to continue treatment through 2/2/24.    Symptomatic orthostatic hypotension  Tachycardia  A-fib with Watchman device  Chronic systolic congestive heart failure  --Echo 1/27/24 with EF 46-50%  --Midodrine discontinued 1/29.  Patient has been normotensive off midodrine.  --Follow up with Dr. Oliveros/BERYL Buitrago with cardiology 3 weeks     Enlarged prostate  Urinary retention  Recent UTI  -Continue Flomax  --Follow up with Dr. Og with ID one week.     Diabetes mellitus type 2  -Hemoglobin A1c was 6.20 in October 23  -Continue home metformin at discharge     CKD stage III  COPD  Dementia  Depression  -Continue home meds    Discharge Follow Up Recommendations for outpatient labs/diagnostics:  --Follow up with PCP one week. Check BMP, CBC.  --Follow up with Dr. Og with [facility] one week.  --Follow up with BERYL Buitrago, or Dr. Oliveros with Cardiology 3 weeks    Day of Discharge     HPI:   Patient resting in bed. NAD. Says he is feeling better. Denies dizziness, weakness, pain. Says he feels he is ready to go home and will continue outpatient PT.     Review of Systems  Gen- No fevers, chills  CV- No chest pain, palpitations  Resp- No cough, dyspnea  GI- No N/V/D, abd pain      Vital Signs:   Temp:  [97.8 °F (36.6 °C)-98.3 °F (36.8 °C)] 98.2 °F (36.8 °C)  Heart Rate:  [] 77  Resp:  [16-18] 18  BP: (106-143)/(79-99) 106/81      Physical Exam:  Constitutional: No acute distress, awake, alert  HENT: NCAT, mucous  membranes moist  Respiratory: Clear to auscultation bilaterally, respiratory effort normal, room air  Cardiovascular: Regularly irregular rhythm, no murmurs, rubs, or gallops  Gastrointestinal: Positive bowel sounds, soft, nontender, nondistended  Musculoskeletal: No bilateral ankle edema  Psychiatric: Appropriate affect, cooperative  Neurologic: Oriented x 3, strength symmetric in all extremities, Cranial Nerves grossly intact to confrontation, speech clear  Skin: No rashes      Pertinent  and/or Most Recent Results     LAB RESULTS:      Lab 01/29/24 0447 01/28/24 0421 01/27/24  0943 01/26/24 0413 01/25/24  2329 01/25/24 2032 01/25/24  1813 01/25/24  1552   WBC 7.85 7.52  --  6.94  --   --   --  7.96   HEMOGLOBIN 10.6* 11.0*  --  11.3*  --   --   --  13.0   HEMATOCRIT 31.4* 33.2*  --  34.6*  --   --   --  39.4   PLATELETS 197 198  --  195  --   --   --  243   NEUTROS ABS 5.51 5.04  --   --   --   --   --  5.65   IMMATURE GRANS (ABS) 0.07* 0.11*  --   --   --   --   --  0.05   LYMPHS ABS 1.36 1.43  --   --   --   --   --  1.46   MONOS ABS 0.44 0.57  --   --   --   --   --  0.46   EOS ABS 0.40 0.32  --   --   --   --   --  0.27   MCV 98.4* 97.9*  --  96.6  --   --   --  98.0*   SED RATE  --   --   --   --   --   --   --  124*   CRP  --   --   --   --   --   --  1.18*  --    PROCALCITONIN  --   --   --   --   --   --  0.04  --    LACTATE  --   --  1.2  --  1.9 2.2*  --  2.9*         Lab 01/30/24  0928 01/29/24 0447 01/28/24 0421 01/27/24  0351 01/26/24 0413 01/25/24  1552   SODIUM 137 144 139 139 137 136   POTASSIUM 4.2 3.9 4.0 4.0 3.7 4.6   CHLORIDE 102 107 102 100 99 96*   CO2 29.0 28.0 28.0 31.0* 28.0 27.0   ANION GAP 6.0 9.0 9.0 8.0 10.0 13.0   BUN 27* 34* 37* 38* 49* 52*   CREATININE 0.93 0.86 0.99 0.95 1.14 1.21   EGFR 79.5 83.8 73.7 77.5 62.2 57.9*   GLUCOSE 222* 154* 155* 215* 178* 138*   CALCIUM 9.0 9.0 9.2 8.9 8.9 9.3   MAGNESIUM  --   --  1.7 1.7 1.7 1.7   HEMOGLOBIN A1C  --  6.10*  --   --   --   --           Lab 01/25/24  1552   TOTAL PROTEIN 7.3   ALBUMIN 3.4*   GLOBULIN 3.9   ALT (SGPT) 38   AST (SGOT) 41*   BILIRUBIN 0.6   ALK PHOS 240*         Lab 01/25/24  1813 01/25/24  1552   PROBNP  --  1,428.0   HSTROP T 42* 42*                 Brief Urine Lab Results  (Last result in the past 365 days)        Color   Clarity   Blood   Leuk Est   Nitrite   Protein   CREAT   Urine HCG        01/25/24 1913 Yellow   Cloudy   Negative   Moderate (2+)   Positive   Negative                 Microbiology Results (last 10 days)       Procedure Component Value - Date/Time    Blood Culture - Blood, Arm, Left [[id number]]  (Normal) Collected: 01/27/24 0943    Lab Status: Preliminary result Specimen: Blood from Arm, Left Updated: 01/30/24 1000     Blood Culture No growth at 3 days    Blood Culture - Blood, Arm, Left [[id number]]  (Abnormal) Collected: 01/25/24 2014    Lab Status: Final result Specimen: Blood from Arm, Left Updated: 01/28/24 0639     Blood Culture Staphylococcus, coagulase negative     Isolated from Aerobic Bottle     Gram Stain Aerobic Bottle Gram positive cocci in groups    Narrative:      Probable contaminant requires clinical correlation, susceptibility not performed unless requested by physician.      Blood Culture - Blood, Arm, Right [[id number]]  (Normal) Collected: 01/25/24 2014    Lab Status: Preliminary result Specimen: Blood from Arm, Right Updated: 01/29/24 2045     Blood Culture No growth at 4 days    Blood Culture ID, PCR - Blood, Arm, Left [[id number]]  (Abnormal) Collected: 01/25/24 2014    Lab Status: Final result Specimen: Blood from Arm, Left Updated: 01/27/24 0829     BCID, PCR Staph spp, not aureus or lugdunensis. Identification by BCID2 PCR.     BOTTLE TYPE Aerobic Bottle    Urine Culture - Urine, Urine, Clean Catch [[id number]]  (Abnormal)  (Susceptibility) Collected: 01/25/24 1913    Lab Status: Final result Specimen: Urine, Clean Catch Updated: 01/27/24 0612     Urine Culture >100,000 CFU/mL  Escherichia coli    Narrative:      Colonization of the urinary tract without infection is common. Treatment is discouraged unless the patient is symptomatic, pregnant, or undergoing an invasive urologic procedure.    Susceptibility        Escherichia coli      JESSICA      Amikacin Susceptible      Amoxicillin + Clavulanate Susceptible      Ampicillin Resistant      Ampicillin + Sulbactam Intermediate      Cefazolin Susceptible      Cefepime Susceptible      Ceftazidime Susceptible      Ceftriaxone Susceptible      Gentamicin Resistant      Levofloxacin Resistant      Nitrofurantoin Susceptible      Piperacillin + Tazobactam Susceptible      Tobramycin Resistant      Trimethoprim + Sulfamethoxazole Resistant                                   Adult Transthoracic Echo Limited W/ Cont if Necessary Per Protocol    Result Date: 1/27/2024    Left ventricular systolic function is low normal. Calculated left ventricular EF = 47.8% Left ventricular ejection fraction appears to be 46 - 50%.     XR Chest 1 View    Result Date: 1/25/2024  XR CHEST 1 VW Date of Exam: 1/25/2024 3:34 PM EST Indication: Chest Pain Protocol Comparison: Chest radiograph and chest CT 1/3/2024 Findings: Cardiomediastinal silhouette is unchanged. Similar left hemidiaphragm with left basilar scarring/subsegmental atelectasis. No focal consolidation or overt pulmonary edema. No pleural effusion or pneumothorax. Osseous structures are unchanged.     Impression: No evidence of acute cardiopulmonary disease. Electronically Signed: Surya Lion MD  1/25/2024 3:52 PM EST  Workstation ID: [id number]             Results for orders placed during the hospital encounter of 01/25/24    Adult Transthoracic Echo Limited W/ Cont if Necessary Per Protocol    Interpretation Summary    Left ventricular systolic function is low normal. Calculated left ventricular EF = 47.8% Left ventricular ejection fraction appears to be 46 - 50%.      Plan for Follow-up of Pending  Labs/Results:   Pending Labs       Order Current Status    Blood Culture - Blood, Arm, Left Preliminary result    Blood Culture - Blood, Arm, Right Preliminary result          Discharge Details        Discharge Medications        New Medications        Instructions Start Date   cefuroxime 500 MG tablet  Commonly known as: CEFTIN   500 mg, Oral, Every 12 Hours Scheduled      digoxin 125 MCG tablet  Commonly known as: LANOXIN   125 mcg, Oral, Daily   Start Date: January 31, 2024            Changes to Medications        Instructions Start Date   finasteride 5 MG tablet  Commonly known as: PROSCAR  What changed: when to take this   5 mg, Oral, Every Night at Bedtime      metFORMIN 1000 MG tablet  Commonly known as: GLUCOPHAGE  What changed: when to take this   Take 1 tablet by mouth twice daily      pravastatin 40 MG tablet  Commonly known as: PRAVACHOL  What changed: when to take this   40 mg, Oral, Daily             Continue These Medications        Instructions Start Date   albuterol sulfate  (90 Base) MCG/ACT inhaler  Commonly known as: PROVENTIL HFA;VENTOLIN HFA;PROAIR HFA   2 puffs, Inhalation, Every 4 Hours PRN      allopurinol 300 MG tablet  Commonly known as: ZYLOPRIM   300 mg, Oral, Daily      ascorbic acid 1000 MG tablet  Commonly known as: VITAMIN C   1,000 mg, Oral, 2 Times Daily      aspirin 81 MG EC tablet   81 mg, Oral, Daily, 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.      clopidogrel 75 MG tablet  Commonly known as: PLAVIX   75 mg, Oral, Daily      Coenzyme Q10 300 MG capsule   1 capsule, Oral, Nightly      docusate sodium 100 MG capsule  Commonly known as: COLACE   200 mg, Oral, Nightly PRN      donepezil 10 MG tablet  Commonly known as: Aricept   10 mg, Oral, Nightly      Iron 240 (27 Fe) MG tablet   1 tablet, Oral, Daily      memantine 10 MG tablet  Commonly known as: NAMENDA   10 mg, Oral, 2 Times Daily      metoprolol tartrate 100 MG tablet  Commonly known as: LOPRESSOR   100 mg, Oral, 2  Times Daily      multivitamin with minerals tablet tablet   1 tablet, Oral, Nightly, Centrum Sliver       omeprazole 20 MG capsule  Commonly known as: priLOSEC   20 mg, Oral, Daily      PROBIOTIC ADVANCED PO   1 tablet, Oral, 2 Times Daily      sertraline 50 MG tablet  Commonly known as: ZOLOFT   Take 1 tablet by mouth once daily      tamsulosin 0.4 MG capsule 24 hr capsule  Commonly known as: FLOMAX   0.4 mg, Oral, Daily      tiotropium bromide-olodaterol 2.5-2.5 MCG/ACT aerosol solution inhaler  Commonly known as: STIOLTO RESPIMAT   2 puffs, Inhalation, Daily, 2 inh once a day             Stop These Medications      furosemide 20 MG tablet  Commonly known as: Lasix     metOLazone 2.5 MG tablet  Commonly known as: ZAROXOLYN     potassium chloride 20 MEQ CR tablet  Commonly known as: K-DUR,KLOR-CON     valsartan 80 MG tablet  Commonly known as: DIOVAN              Allergies   Allergen Reactions    Xarelto [Rivaroxaban] GI Bleeding     GI bleed    Penicillins Hives     Has tolerated cefepime, ceftriaxone, cefazolin, cefdinir, cefuroxime, cephalexin         Discharge Disposition:  Home or Self Care    Diet:  Hospital:  Diet Order   Procedures    Diet: Cardiac Diets, Diabetic Diets; Healthy Heart (2-3 Na+); Consistent Carbohydrate; Texture: Regular Texture (IDDSI 7); Fluid Consistency: Thin (IDDSI 0)       Diet Instructions       Diet: Cardiac Diets, Diabetic Diets; Healthy Heart (2-3 Na+); Regular Texture (IDDSI 7); Thin (IDDSI 0); Consistent Carbohydrate      Discharge Diet:  Cardiac Diets  Diabetic Diets       Cardiac Diet: Healthy Heart (2-3 Na+)    Texture: Regular Texture (IDDSI 7)    Fluid Consistency: Thin (IDDSI 0)    Diabetic Diet: Consistent Carbohydrate             Activity:  Activity Instructions       Activity as Tolerated      Measure Blood Pressure      Measure blood pressure two times a day for one week, once in the morning and once in the evening at 5 PM. Make sure you have been sitting for 5 minutes  prior to checking your blood pressure and make sure your arm is at heart level on an armrest or table. Record these numbers and take to your follow-up appointment with your primary care provider and your cardiologist.    Measure Weight      Measure morning weight daily for one week. Record these numbers and take to your follow-up appointment with your PCP and cardiologist.            Restrictions or Other Recommendations:         CODE STATUS:    Code Status and Medical Interventions:   Ordered at: 01/25/24 3729     Code Status (Patient has no pulse and is not breathing):    CPR (Attempt to Resuscitate)     Medical Interventions (Patient has pulse or is breathing):    Full Support       Future Appointments   Date Time Provider Department Center   2/1/2024  4:00 PM Pito Way MD [facility]   2/8/2024 10:15 AM Jayden Sarkar PA-C [facility]   3/5/2024  1:00 PM Blanquita Ansari APRN [facility]   3/7/2024  1:30 PM Anthony Mcdaniel MD [facility]   4/15/2024 10:00 AM [facility] FLO CT 1 BH [facility] [place]   4/15/2024  2:00 PM Abbe Leary MD [facility] None   5/31/2024 12:30 PM Haja Henry APRN [facility]   10/29/2024  9:30 AM Titus Oliveros IV, MD [facility]       Additional Instructions for the Follow-ups that You Need to Schedule       Discharge Follow-up with PCP   As directed       Currently Documented PCP:    Pito Way MD    PCP Phone Number:    [phone]     Follow Up Details: One week        Discharge Follow-up with Specified Provider: Dr. Og with [facility]; 1 Week   As directed      To: Dr. Og with [facility]   Follow Up: 1 Week        Discharge Follow-up with Specified Provider: BERYL Buitrago with Dr. Oliveros with Cardiology; 3 Weeks   As directed      To: BERYL Buitrago with Dr. Oliveros with Cardiology   Follow Up: 3 Weeks                BERYL Borja  01/30/24      Time Spent on Discharge:  I spent  45  minutes on this  discharge activity which included: face-to-face encounter with the patient, reviewing the data in the system, coordination of the care with the nursing staff as well as consultants, documentation, and entering orders.            Electronically signed by Rossana Danielle APRN at 01/30/24 1525       Discharge Order (From admission, onward)       Start     Ordered    01/30/24 1150  Discharge patient  Once        Expected Discharge Date: 01/30/24   Discharge Disposition: Home or Self Care   Physician of Record for Attribution - Please select from Treatment Team: JOSE ANTONIO MCINTOSH [[id number]]   Review needed by CMO to determine Physician of Record: No      Question Answer Comment   Physician of Record for Attribution - Please select from Treatment Team JOSE ANTONIO MCINTOSH    Review needed by CMO to determine Physician of Record No        01/30/24 1143

## 2024-01-31 NOTE — OUTREACH NOTE
Call Center TCM Note      Flowsheet Row Responses   [facility] patient discharged from? [place]   Does the patient have one of the following disease processes/diagnoses(primary or secondary)? Sepsis   TCM attempt successful? Yes  [no verbal release but notes indicate logan Jordan active with POC during admission]   Call start time 1356   Call end time 1400   Discharge diagnosis Hypotension/Sepsis   Person spoke with today (if not patient) and relationship Nikki-dtr   Meds reviewed with patient/caregiver? Yes   Is the patient having any side effects they believe may be caused by any medication additions or changes? No   Does the patient have all medications related to this admission filled (includes all antibiotics, inhalers, nebulizers,steroids,etc.) Yes   Is the patient taking all medications as directed (includes completed medication regime)? Yes   Medication comments taking ATBs as ordered   Comments FU appt 2/1/24--appt was prescheduled, pt plans on keeping--will send a note   Does the patient have an appointment with their PCP within 7-14 days of discharge? Yes   Has home health visited the patient within 72 hours of discharge? N/A   Psychosocial issues? No   Did the patient receive a copy of their discharge instructions? Yes   Nursing interventions Reviewed instructions with patient   What is the patient's perception of their health status since discharge? Improving  [Dtr reports that pt is doing well--up and completed ADLs this am.  Encouraged to monitor for increased s/s infection as noted that pt in/out cath's.  Atbs added at discharge, atbs confirmed.]   Nursing interventions Nurse provided patient education   Is the patient/caregiver able to teach back TIME? T emperature - higher or lower than normal, I nfection - may have signs and symptoms of an infection, M ental Decline - confused, sleepy, difficult to arouse, E xtremely Ill - severe pain, discomfort, shortness of breath  [reviewed]   Nursing  interventions Nurse provided patient education   Is the patient/caregiver able to teach back signs and symptoms of worsening condition: Fever, Altered mental status(confusion/coma)   If the patient is a current smoker, are they able to teach back resources for cessation? Not a smoker   TCM call completed? Yes   Call end time 1400            Kassidy Coulter RN    1/31/2024, 14:05 EST

## 2024-02-01 ENCOUNTER — OFFICE VISIT (OUTPATIENT)
Dept: INTERNAL MEDICINE | Facility: CLINIC | Age: 89
End: 2024-02-01
Payer: MEDICARE

## 2024-02-01 VITALS
RESPIRATION RATE: 16 BRPM | TEMPERATURE: 97.8 F | HEART RATE: 62 BPM | DIASTOLIC BLOOD PRESSURE: 64 MMHG | BODY MASS INDEX: 32.12 KG/M2 | SYSTOLIC BLOOD PRESSURE: 128 MMHG | WEIGHT: 257 LBS

## 2024-02-01 DIAGNOSIS — N30.00 ACUTE CYSTITIS WITHOUT HEMATURIA: ICD-10-CM

## 2024-02-01 DIAGNOSIS — R42 DIZZINESS: ICD-10-CM

## 2024-02-01 DIAGNOSIS — I10 ESSENTIAL HYPERTENSION: Primary | ICD-10-CM

## 2024-02-01 DIAGNOSIS — E11.9 TYPE 2 DIABETES MELLITUS WITHOUT COMPLICATION, WITHOUT LONG-TERM CURRENT USE OF INSULIN: ICD-10-CM

## 2024-02-01 LAB
BACTERIA SPEC AEROBE CULT: NORMAL
BILIRUB BLD-MCNC: NEGATIVE MG/DL
CLARITY, POC: CLEAR
COLOR UR: ABNORMAL
EXPIRATION DATE: ABNORMAL
GLUCOSE UR STRIP-MCNC: NEGATIVE MG/DL
KETONES UR QL: NEGATIVE
LEUKOCYTE EST, POC: NEGATIVE
Lab: ABNORMAL
NITRITE UR-MCNC: NEGATIVE MG/ML
PH UR: 5 [PH] (ref 5–8)
PROT UR STRIP-MCNC: NEGATIVE MG/DL
RBC # UR STRIP: NEGATIVE /UL
SP GR UR: 1.03 (ref 1–1.03)
UROBILINOGEN UR QL: NORMAL

## 2024-02-01 PROCEDURE — 87086 URINE CULTURE/COLONY COUNT: CPT | Performed by: INTERNAL MEDICINE

## 2024-02-02 NOTE — PROGRESS NOTES
"Enter Query Response Below      Query Response: sepsis was not supported  Electronically signed by Tyree Jim MD, 24, 9:39 PM EST.              If applicable, please update the problem list.       Patient: Darien Camarena \"Isidro\"        : 1935  Account: [id number]           Admit Date:         How to Respond to this query:       a. Click New Note     b. Answer query within the yellow box.                c. Update the Problem List, if applicable.      If you have any questions about this query contact me at: [email or web address]     :     Patient presents to emergency room with dizziness, low blood pressure and weakness.  On arrival temperature 97.9, heart rate 23, respirations 20, bp 81/62, wbc 7.96, creat 1.21, urinalysis 4+bacteria.  History and physical states suspected sepsis secondary to urinary tract infection, history of ESBL Klebsiella recent treatment carbopenem treatment ending , I am not entirely convinced that this is an active infection, we will continue antibiotics.  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  Medicine progress note states sepsis in active problem list, recent UTI, transition to oral Ceftin with continue through 2024.     After study, was sepsis clinically supported during this admission?    Sepsis was supported with additional clinical indicators:____________  Sepsis was not supported.  Other- specify_____________  Unable to determine.    By submitting this query, we are merely seeking further clarification of documentation to accurately reflect all conditions that you are monitoring, evaluating, treating or that extend the hospitalization or utilize additional resources of care. Please utilize your independent clinical judgment when addressing the question(s) above.     This query and your response, once completed, will be " entered into the legal medical record.    Sincerely,  Tabitha ROTH RN BSN   Clinical Documentation Integrity Program   [email or web address]

## 2024-02-03 LAB — BACTERIA SPEC AEROBE CULT: NO GROWTH

## 2024-02-05 DIAGNOSIS — E78.5 HYPERLIPIDEMIA LDL GOAL <100: ICD-10-CM

## 2024-02-05 RX ORDER — PRAVASTATIN SODIUM 40 MG
40 TABLET ORAL DAILY
Qty: 90 TABLET | Refills: 0 | Status: SHIPPED | OUTPATIENT
Start: 2024-02-05

## 2024-02-06 NOTE — CASE MANAGEMENT/SOCIAL WORK
Case Management Discharge Note      Final Note: Patient is being discharged home today. He will [facility] 5 days of IV infusions starting tomorrow -@ 9:00am on saturday 1/6 for infusion  @ 9:15am on Sunday 1/7 for follow up with Dr Og. He remains independent of ADLs and denies any other needs regarding discharge. CM will fax ID note to their office as directed - bairon [phone]         Selected Continued Care - Admitted Since 1/3/2024       Destination    No services have been selected for the patient.                Durable Medical Equipment    No services have been selected for the patient.                Dialysis/Infusion       Service Provider Selected Services Address Phone Fax Patient Preferred    [facility] [address] [place] [address] [address], [facility] [address] [phone] --              Home Medical Care    No services have been selected for the patient.                Therapy    No services have been selected for the patient.                Community Resources    No services have been selected for the patient.                Community & DME    No services have been selected for the patient.                         Final Discharge Disposition Code: 01 - home or self-care  
Discharge Planning Assessment  [facility]     Patient Name: Darien Camarena  MRN: [id number]  Today's Date: 1/4/2024    Admit Date: 1/3/2024    Plan: CM Eval   Discharge Needs Assessment    No documentation.                  Discharge Plan       Row Name 01/04/24 1555       Plan    Plan CM Eval    Plan Comments Confirmed patient lives alone in [facility]. He still drives and ambulates independently. He self caths at home as well. He ambulated 250 ft with PT today. Infectious disease is following. Goal is to return home upon discharge - CM will follow for any dc planning- bairon [phone]    Final Discharge Disposition Code 01 - home or self-care                  Continued Care and Services - Admitted Since 1/3/2024    Coordination has not been started for this encounter.       Expected Discharge Date and Time       Expected Discharge Date Expected Discharge Time    Nate 3, 2024            Demographic Summary    No documentation.                  Functional Status    No documentation.                  Psychosocial    No documentation.                  Abuse/Neglect    No documentation.                  Legal    No documentation.                  Substance Abuse    No documentation.                  Patient Forms    No documentation.                     Bairon Burdick RN    
N/A

## 2024-02-08 ENCOUNTER — OFFICE VISIT (OUTPATIENT)
Dept: UROLOGY | Facility: CLINIC | Age: 89
End: 2024-02-08
Payer: MEDICARE

## 2024-02-08 VITALS — OXYGEN SATURATION: 94 % | BODY MASS INDEX: 31.95 KG/M2 | HEART RATE: 67 BPM | WEIGHT: 257 LBS | HEIGHT: 75 IN

## 2024-02-08 DIAGNOSIS — N39.0 RECURRENT UTI: ICD-10-CM

## 2024-02-08 DIAGNOSIS — R33.9 URINARY RETENTION: Primary | ICD-10-CM

## 2024-02-08 RX ORDER — METHENAMINE HIPPURATE 1000 MG/1
1 TABLET ORAL 2 TIMES DAILY WITH MEALS
COMMUNITY
Start: 2023-10-02

## 2024-02-08 NOTE — PROGRESS NOTES
Follow Up Office Visit      Patient Name: Darien Camarnea  : 1935   MRN: [id number]     Chief Complaint:    Chief Complaint   Patient presents with    Urinary Retention    Hospital Follow Up Visit       Referring Provider: No ref. provider found    History of Present Illness: Darien Camarena is a 88 y.o. male who presents today for follow up of chronic urinary retention and recurrent UTIs.  Accompanied today with his daughter.  Last seen in clinic on 11/3/2023.  Since we last saw him, he has been in the hospital a couple of times.  Most recently from 2024 to 2024 for symptomatic orthostatic hypotension with tachycardia.  He was also found to have recurrent UTI that grew out E. coli resistant to ampicillin, gentamicin, Levaquin, tobramycin and Bactrim.  He was initially treated with Merrem and then transition to Ceftin.  He is followed by infectious disease, Dr. Og.   He and his daughter are unsure if he is still on antibiotic.  His Flomax was discontinued due to concerns of orthostatic hypotension.    Patient states he feels well today.  Continues to CIC 4 times a day.  Does not measure the amount that comes out though feels like it is adequately emptying his bladder.  He states he is able to self cath himself easily without any resistance and that he is using proper aseptic technique.  Denies any gross hematuria.      Subjective      Review of System: Review of Systems   Genitourinary:  Negative for decreased urine volume, difficulty urinating, dysuria, enuresis, flank pain, frequency, hematuria and urgency.      I have reviewed the ROS documented by my clinical staff, I have updated appropriately and I agree. Jayden Sarkar PA-C    I have reviewed and the following portions of the patient's history were updated as appropriate: past family history, past medical history, past social history, past surgical history and problem list.    Medications:     Current Outpatient Medications:      albuterol sulfate  (90 Base) MCG/ACT inhaler, Inhale 2 puffs Every 4 (Four) Hours As Needed for Wheezing., Disp: 18 g, Rfl: 5    allopurinol (ZYLOPRIM) 300 MG tablet, Take 1 tablet by mouth Daily., Disp: 90 tablet, Rfl: 0    ascorbic acid (VITAMIN C) 1000 MG tablet, Take 1 tablet by mouth 2 (Two) Times a Day., Disp: , Rfl:     aspirin 81 MG EC tablet, Take 1 tablet by mouth Daily. 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., Disp: 90 tablet, Rfl: 1    clopidogrel (PLAVIX) 75 MG tablet, Take 1 tablet by mouth Daily for 132 days., Disp: 30 tablet, Rfl: 4    Coenzyme Q10 300 MG capsule, Take 1 capsule by mouth Every Night., Disp: , Rfl:     digoxin (LANOXIN) 125 MCG tablet, Take 1 tablet by mouth Daily., Disp: 90 tablet, Rfl: 1    docusate sodium (COLACE) 100 MG capsule, Take 2 capsules by mouth At Night As Needed for Constipation., Disp: , Rfl:     donepezil (Aricept) 10 MG tablet, Take 1 tablet by mouth Every Night., Disp: 90 tablet, Rfl: 0    Ferrous Gluconate (IRON) 240 (27 FE) MG tablet, Take 1 tablet by mouth Daily., Disp: , Rfl:     finasteride (PROSCAR) 5 MG tablet, TAKE 1 TABLET BY MOUTH EVERY DAY AT BEDTIME (Patient taking differently: Take 1 tablet by mouth Every Night.), Disp: 90 tablet, Rfl: 0    memantine (NAMENDA) 10 MG tablet, Take 1 tablet by mouth 2 (Two) Times a Day., Disp: 60 tablet, Rfl: 5    metFORMIN (GLUCOPHAGE) 1000 MG tablet, Take 1 tablet by mouth twice daily (Patient taking differently: Take 1 tablet by mouth 2 (Two) Times a Day With Meals.), Disp: 180 tablet, Rfl: 1    methenamine (HIPREX) 1 g tablet, Take 1 tablet by mouth 2 (Two) Times a Day With Meals., Disp: , Rfl:     metoprolol tartrate (LOPRESSOR) 100 MG tablet, Take 1 tablet by mouth 2 (Two) Times a Day., Disp: 180 tablet, Rfl: 3    multivitamin with minerals (MULTIVITAMIN MEN 50+ PO), Take 1 tablet by mouth Every Night. Centrdrake Hernandez, Disp: , Rfl:     omeprazole (priLOSEC) 20 MG capsule, Take 1 capsule by mouth once  "daily, Disp: 90 capsule, Rfl: 1    pravastatin (PRAVACHOL) 40 MG tablet, Take 1 tablet by mouth once daily, Disp: 90 tablet, Rfl: 0    Probiotic Product (PROBIOTIC ADVANCED PO), Take 1 tablet by mouth 2 (Two) Times a Day., Disp: , Rfl:     sertraline (ZOLOFT) 50 MG tablet, Take 1 tablet by mouth once daily, Disp: 90 tablet, Rfl: 0    tiotropium bromide-olodaterol (STIOLTO RESPIMAT) 2.5-2.5 MCG/ACT aerosol solution inhaler, Inhale 2 puffs Daily. 2 inh once a day, Disp: 1 each, Rfl: 3    Allergies:   Allergies   Allergen Reactions    Xarelto [Rivaroxaban] GI Bleeding     GI bleed    Penicillins Hives     Has tolerated cefepime, ceftriaxone, cefazolin, cefdinir, cefuroxime, cephalexin         Objective     Physical Exam:   Vital Signs:   Vitals:    02/08/24 0947   Pulse: 67   SpO2: 94%   Weight: 117 kg (257 lb)   Height: 190.5 cm (75\")   PainSc: 0-No pain     Body mass index is 32.12 kg/m².     Physical Exam  Vitals and nursing note reviewed.   Constitutional:       Appearance: Normal appearance.   HENT:      Head: Normocephalic and atraumatic.      Mouth/Throat:      Mouth: Mucous membranes are moist.      Pharynx: Oropharynx is clear.   Eyes:      Extraocular Movements: Extraocular movements intact.      Conjunctiva/sclera: Conjunctivae normal.   Cardiovascular:      Rate and Rhythm: Normal rate and regular rhythm.   Pulmonary:      Effort: Pulmonary effort is normal. No respiratory distress.   Abdominal:      Palpations: Abdomen is soft.   Musculoskeletal:         General: Normal range of motion.      Cervical back: Normal range of motion.   Skin:     General: Skin is warm and dry.   Neurological:      General: No focal deficit present.      Mental Status: He is alert and oriented to person, place, and time.   Psychiatric:         Mood and Affect: Mood normal.         Behavior: Behavior normal.         Labs:   Brief Urine Lab Results  (Last result in the past 365 days)        Color   Clarity   Blood   Leuk Est   " Nitrite   Protein   CREAT   Urine HCG        02/01/24 1720 Dark Yellow   Clear   Negative   Negative   Negative   Negative                   Urine Culture          1/3/2024    14:41 1/25/2024    19:13 2/1/2024    17:14   Urine Culture   Urine Culture >100,000 CFU/mL Escherichia coli  >100,000 CFU/mL Escherichia coli  No growth         Lab Results   Component Value Date    GLUCOSE 222 (H) 01/30/2024    CALCIUM 9.0 01/30/2024     01/30/2024    K 4.2 01/30/2024    CO2 29.0 01/30/2024     01/30/2024    BUN 27 (H) 01/30/2024    CREATININE 0.93 01/30/2024    EGFRIFNONA 79 12/16/2021    BCR 29.0 (H) 01/30/2024    ANIONGAP 6.0 01/30/2024       Lab Results   Component Value Date    WBC 7.85 01/29/2024    HGB 10.6 (L) 01/29/2024    HCT 31.4 (L) 01/29/2024    MCV 98.4 (H) 01/29/2024     01/29/2024         Measures:   Tobacco:   Darien Camarena  reports that he quit smoking about 63 years ago. His smoking use included cigarettes. He started smoking about 77 years ago. He has a 15.00 pack-year smoking history. He has been exposed to tobacco smoke. He quit smokeless tobacco use about 13 years ago.  His smokeless tobacco use included chew..     Assessment / Plan      Assessment/Plan:   88 y.o. male who presented today for follow up of chronic urinary retention and recurrent UTIs.  Requires CIC 4 times daily.  Unfortunately his Flomax had to be stopped due to issues with orthostatic hypotension.  He is not having any issues with CIC and has plenty catheters at his house.  Discussed the importance of maintaining CIC 4 times a day to make sure he send things bladder all the way specially now that he is not on Flomax, and to use proper aseptic technique.  We discussed this technique in the office today.  Recommend continued follow-up with ID regarding recurrent UTIs.  Will see him back in 3 months for symptom check.  If he is doing well at that time, we will have him return in 6 months.  He and daughter know to  call the office with any questions or concerns.  They are happy with this plan.    Diagnoses and all orders for this visit:    1. Urinary retention (Primary)      -Continue CIC 4 times daily    2. Recurrent UTI      -Follow-up with ID       Follow Up:   Return in about 3 months (around 5/8/2024) for Recheck.    I spent approximately 20 minutes providing clinical care for this patient; including review of patient's chart and provider documentation, face to face time spent with patient in examination room (obtaining history, performing physical exam, discussing diagnosis and management options), placing orders, and completing patient documentation.     Jayden Sarkar PA-C  [facility]

## 2024-02-09 ENCOUNTER — READMISSION MANAGEMENT (OUTPATIENT)
Dept: CALL CENTER | Facility: HOSPITAL | Age: 89
End: 2024-02-09
Payer: MEDICARE

## 2024-02-09 NOTE — OUTREACH NOTE
Sepsis Week 2 Survey      Flowsheet Row Responses   [facility] facility patient discharged from? [place]   Does the patient have one of the following disease processes/diagnoses(primary or secondary)? Sepsis   Week 2 attempt successful? No   Unsuccessful attempts Attempt 1            Karis ALONSO - Registered Nurse

## 2024-02-13 ENCOUNTER — READMISSION MANAGEMENT (OUTPATIENT)
Dept: CALL CENTER | Facility: HOSPITAL | Age: 89
End: 2024-02-13
Payer: MEDICARE

## 2024-02-28 NOTE — PROGRESS NOTES
Cardiology Outpatient Visit      Identification: Darien Camarena is a 88 y.o. male who resides in [place], [place]    Reason for visit:  PAF, Hypertension, and Coronary Artery Disease      Subjective      Patient is an 88-year-old gentleman who returns today for follow-up of his coronary artery disease, permanent atrial fibrillation, chronic systolic heart failure and cardiac risk factors.  Patient was hospitalized in January for a UTI and urosepsis  He was found to be hypotensive and diuretics and valsartan were held and he was started on midodrine for pressure support.  Cardiology was consulted and medication changes were made.  Valsartan, furosemide and metolazone were all discontinued due to hypotension requiring administration of midodrine.  Digoxin was added for better rate control of his atrial fibrillation.  He has a Watchman device therefore he is not on NOAC.  He is currently on aspirin and Plavix.  He has a follow-up with Dr. Mcdaniel next week.  He already had his 45-day FARHAD which showed device was well-seated.  The patient has been doing well since he was discharged from the hospital.  His blood pressures have improved and he is not requiring midodrine.  He does have some lower extremity edema but denies any chest pain or shortness of breath.           Review of Systems   Constitutional: Negative for malaise/fatigue.   Eyes:  Negative for vision loss in left eye and vision loss in right eye.   Cardiovascular:  Positive for leg swelling. Negative for chest pain, dyspnea on exertion, near-syncope, orthopnea, palpitations, paroxysmal nocturnal dyspnea and syncope.   Musculoskeletal:  Negative for myalgias.   Neurological:  Negative for brief paralysis, excessive daytime sleepiness, focal weakness, numbness, paresthesias and weakness.   All other systems reviewed and are negative.      Allergies   Allergen Reactions    Xarelto [Rivaroxaban] GI Bleeding     GI bleed    Penicillins Hives     Has  "tolerated cefepime, ceftriaxone, cefazolin, cefdinir, cefuroxime, cephalexin         Current Outpatient Medications   Medication Instructions    albuterol sulfate  (90 Base) MCG/ACT inhaler 2 puffs, Inhalation, Every 4 Hours PRN    allopurinol (ZYLOPRIM) 300 mg, Oral, Daily    ascorbic acid (VITAMIN C) 1,000 mg, Oral, 2 Times Daily    aspirin 81 mg, Oral, Daily, 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.    clopidogrel (PLAVIX) 75 mg, Oral, Daily    Coenzyme Q10 300 MG capsule 1 capsule, Oral, Nightly    docusate sodium (COLACE) 200 mg, Oral, Nightly PRN    donepezil (ARICEPT) 10 mg, Oral, Nightly    Ferrous Gluconate (IRON) 240 (27 FE) MG tablet 1 tablet, Oral, Daily    finasteride (PROSCAR) 5 mg, Oral, Every Night at Bedtime    furosemide (LASIX) 20 mg, Oral, Daily    memantine (NAMENDA) 10 mg, Oral, 2 Times Daily    metFORMIN (GLUCOPHAGE) 1000 MG tablet Take 1 tablet by mouth twice daily    methenamine (HIPREX) 1 g, Oral, 2 Times Daily With Meals    metoprolol tartrate (LOPRESSOR) 100 mg, Oral, 2 Times Daily    multivitamin with minerals (MULTIVITAMIN MEN 50+ PO) 1 tablet, Oral, Nightly, Centrum Sliver     omeprazole (PRILOSEC) 20 mg, Oral, Daily    pravastatin (PRAVACHOL) 40 mg, Oral, Daily    Probiotic Product (PROBIOTIC ADVANCED PO) 1 tablet, Oral, 2 Times Daily    sertraline (ZOLOFT) 50 MG tablet Take 1 tablet by mouth once daily    tiotropium bromide-olodaterol (STIOLTO RESPIMAT) 2.5-2.5 MCG/ACT aerosol solution inhaler 2 puffs, Inhalation, Daily, 2 inh once a day    valsartan (DIOVAN) 40 mg, Oral, Daily         Objective     /72 (BP Location: Right arm, Patient Position: Sitting)   Pulse 79   Ht 190.5 cm (75\")   Wt 118 kg (261 lb)   SpO2 95%   BMI 32.62 kg/m²       Constitutional:       General: Awake.      Appearance: Healthy appearance.   Pulmonary:      Effort: Pulmonary effort is normal.      Breath sounds: Normal breath sounds.   Cardiovascular:      Normal rate. Irregularly irregular " rhythm.      Murmurs: There is no murmur.   Pulses:     Intact distal pulses.   Edema:     Peripheral edema present.     Thigh: bilateral 1+ edema of the thigh.     Pretibial: bilateral 1+ edema of the pretibial area.     Ankle: bilateral 1+ edema of the ankle.     Feet: bilateral 1+ edema of the feet.  Skin:     General: Skin is warm and dry.   Neurological:      Mental Status: Alert and oriented to person, place and time.   Psychiatric:         Behavior: Behavior is cooperative.         Result Review  (reviewed with patient):            Lab Results   Component Value Date    GLUCOSE 222 (H) 01/30/2024    BUN 27 (H) 01/30/2024    CREATININE 0.93 01/30/2024    EGFR 79.5 01/30/2024    BCR 29.0 (H) 01/30/2024    K 4.2 01/30/2024    CO2 29.0 01/30/2024    CALCIUM 9.0 01/30/2024    ALBUMIN 3.4 (L) 01/25/2024    BILITOT 0.6 01/25/2024    AST 41 (H) 01/25/2024    ALT 38 01/25/2024     Lab Results   Component Value Date    WBC 7.85 01/29/2024    HGB 10.6 (L) 01/29/2024    HCT 31.4 (L) 01/29/2024    MCV 98.4 (H) 01/29/2024     01/29/2024     Lab Results   Component Value Date    CHOL 98 10/30/2023    TRIG 127 10/30/2023    HDL 34 (L) 10/30/2023    LDL 41 10/30/2023     Lab Results   Component Value Date    HGBA1C 6.10 (H) 01/29/2024           Assessment     Diagnoses and all orders for this visit:    1. Coronary artery disease involving native coronary artery of native heart with angina pectoris (Primary)  Overview:  Cardiac catheterization (9/29/2022): Mild CAD.  Normal LV filling pressure    Assessment & Plan:  No signs or symptoms of angina or heart failure  Continue aspirin 81 mg daily      2. Heart failure with mildly reduced ejection fraction (HFmrEF)  Overview:  Cardiac catheterization (2022): Mild CAD. Normal LV filling pressure  Echo (8/8/2022): EF 50%, anatomically and functionally normal valves  FARHAD (1/12/2024): LVEF 40%.  27 mm Watchman device well-seated.  No thrombus or periprosthetic flow.  Mild MR but  no significant valvular abnormality  Echo (1/27/2024): LVEF 48%      3. Permanent atrial fibrillation  Overview:  Diagnosed 2012.   FARHAD-guided ECV, 8/17/2012  CHADS-VASc 5 (age > 75, CAD, HTN, DM)  Multiple cardioversions, 2018, 2019, 2020, 2021  Unsuccessful cardioversion with conversion to rate control strategy, 2023  Echo (8/8/2022): EF 50%, anatomically and functionally normal valves  Watchman implant by Anthony Mcdaniel, 11/28/2023  FARHAD (1/12/2024): LVEF 40%.  27 mm Watchman device well-seated.  No thrombus or periprosthetic flow.       Assessment & Plan:  Continue metoprolol tartrate 100 mg twice daily  Discontinue digoxin  Defer NOAC due to presence of Watchman device  Continue aspirin 81 mg daily and Plavix 75 mg daily  No signs or symptoms TIA or CVA      4. Essential hypertension  Overview:  Target blood pressure <130/80 mmHg    Assessment & Plan:  Hypertension is borderline  Continue metoprolol tartrate 100 mg twice daily  Start valsartan 40 mg daily  BMP in 1 week    Orders:  -     Basic Metabolic Panel; Future    5. Hyperlipidemia LDL goal <100  Overview:  Moderate intensity statin therapy reasonable due to diabetic status    Assessment & Plan:   Continue pravastatin 40 mg daily      6. Presence of Watchman left atrial appendage closure device  Assessment & Plan:  Continue aspirin and Plavix  Follow-up with Dr. Mcdaniel at already scheduled appointment for when Plavix can be discontinued      7. Chronic systolic heart failure  Overview:  FARHAD (1/12/2024): LVEF 40%.  27 mm Watchman device well-seated.  No thrombus.  Echo (1/27/2024): LVEF 47.8%.     Assessment & Plan:  Prefer metoprolol tartrate over succinate for better rate control of atrial fibrillation  Start valsartan 40 mg daily  Start Lasix 20 mg daily  BMP in 1 week      Other orders  -     valsartan (DIOVAN) 40 MG tablet; Take 1 tablet by mouth Daily.  Dispense: 90 tablet; Refill: 1  -     furosemide (LASIX) 20 MG tablet; Take 1 tablet by mouth Daily.   Dispense: 90 tablet; Refill: 1          Plan   Start valsartan 40 mg daily for systolic heart failure and hypertension  Start Lasix 20 mg daily for lower extremity edema  BMP in 1 week  Prefer metoprolol to tartrate over succinate for better rate control of atrial fibrillation  Defer NOAC due to presence of Watchman device  Continue aspirin and Plavix and follow-up with Dr. Mcdaniel at already scheduled appointment      Follow-up   Return in about 6 months (around 9/6/2024), or if symptoms worsen or fail to improve, for Follow-up with Dr. Oliveros next visit.      Blanquita Ansari, APRN  3/6/2024

## 2024-03-06 ENCOUNTER — OFFICE VISIT (OUTPATIENT)
Dept: CARDIOLOGY | Facility: CLINIC | Age: 89
End: 2024-03-06
Payer: MEDICARE

## 2024-03-06 ENCOUNTER — OFFICE VISIT (OUTPATIENT)
Dept: INTERNAL MEDICINE | Facility: CLINIC | Age: 89
End: 2024-03-06
Payer: MEDICARE

## 2024-03-06 VITALS
DIASTOLIC BLOOD PRESSURE: 78 MMHG | HEIGHT: 75 IN | SYSTOLIC BLOOD PRESSURE: 126 MMHG | HEART RATE: 96 BPM | BODY MASS INDEX: 32.5 KG/M2 | RESPIRATION RATE: 18 BRPM | TEMPERATURE: 97.1 F | WEIGHT: 261.4 LBS

## 2024-03-06 VITALS
HEIGHT: 75 IN | HEART RATE: 79 BPM | OXYGEN SATURATION: 95 % | BODY MASS INDEX: 32.45 KG/M2 | DIASTOLIC BLOOD PRESSURE: 72 MMHG | WEIGHT: 261 LBS | SYSTOLIC BLOOD PRESSURE: 146 MMHG

## 2024-03-06 DIAGNOSIS — E78.5 HYPERLIPIDEMIA LDL GOAL <100: Chronic | ICD-10-CM

## 2024-03-06 DIAGNOSIS — I10 ESSENTIAL HYPERTENSION: Chronic | ICD-10-CM

## 2024-03-06 DIAGNOSIS — I48.21 PERMANENT ATRIAL FIBRILLATION: ICD-10-CM

## 2024-03-06 DIAGNOSIS — I25.119 CORONARY ARTERY DISEASE INVOLVING NATIVE CORONARY ARTERY OF NATIVE HEART WITH ANGINA PECTORIS: Primary | ICD-10-CM

## 2024-03-06 DIAGNOSIS — I50.22 HEART FAILURE WITH MILDLY REDUCED EJECTION FRACTION (HFMREF): ICD-10-CM

## 2024-03-06 DIAGNOSIS — Z95.818 PRESENCE OF WATCHMAN LEFT ATRIAL APPENDAGE CLOSURE DEVICE: ICD-10-CM

## 2024-03-06 DIAGNOSIS — I10 ESSENTIAL HYPERTENSION: Primary | ICD-10-CM

## 2024-03-06 DIAGNOSIS — I50.22 CHRONIC SYSTOLIC HEART FAILURE: ICD-10-CM

## 2024-03-06 PROCEDURE — 99213 OFFICE O/P EST LOW 20 MIN: CPT | Performed by: INTERNAL MEDICINE

## 2024-03-06 RX ORDER — FUROSEMIDE 20 MG/1
20 TABLET ORAL DAILY
Qty: 90 TABLET | Refills: 1 | Status: SHIPPED | OUTPATIENT
Start: 2024-03-06

## 2024-03-06 RX ORDER — VALSARTAN 40 MG/1
40 TABLET ORAL DAILY
Qty: 90 TABLET | Refills: 1 | Status: SHIPPED | OUTPATIENT
Start: 2024-03-06

## 2024-03-06 NOTE — ASSESSMENT & PLAN NOTE
Continue aspirin and Plavix  Follow-up with Dr. Mcdaniel at already scheduled appointment for when Plavix can be discontinued

## 2024-03-06 NOTE — ASSESSMENT & PLAN NOTE
Hypertension is borderline  Continue metoprolol tartrate 100 mg twice daily  Start valsartan 40 mg daily  BMP in 1 week

## 2024-03-06 NOTE — ASSESSMENT & PLAN NOTE
Continue metoprolol tartrate 100 mg twice daily  Discontinue digoxin  Defer NOAC due to presence of Watchman device  Continue aspirin 81 mg daily and Plavix 75 mg daily  No signs or symptoms TIA or CVA

## 2024-03-07 ENCOUNTER — OFFICE VISIT (OUTPATIENT)
Dept: CARDIOLOGY | Facility: CLINIC | Age: 89
End: 2024-03-07
Payer: MEDICARE

## 2024-03-07 VITALS
OXYGEN SATURATION: 94 % | SYSTOLIC BLOOD PRESSURE: 146 MMHG | DIASTOLIC BLOOD PRESSURE: 70 MMHG | BODY MASS INDEX: 31.83 KG/M2 | WEIGHT: 256 LBS | HEIGHT: 75 IN

## 2024-03-07 DIAGNOSIS — I10 ESSENTIAL HYPERTENSION: Chronic | ICD-10-CM

## 2024-03-07 DIAGNOSIS — Z95.818 PRESENCE OF WATCHMAN LEFT ATRIAL APPENDAGE CLOSURE DEVICE: ICD-10-CM

## 2024-03-07 DIAGNOSIS — I48.21 PERMANENT ATRIAL FIBRILLATION: Primary | Chronic | ICD-10-CM

## 2024-03-07 NOTE — PROGRESS NOTES
Chief Complaint   Patient presents with    Follow-up     For chronic medical conditions        History of Present Illness    The patient presents for a follow-up related to hypertension. The patient reports that he has had no headaches, chest pain, dyspnea, edema, syncope, blurred vision or palpitations. He states that he is taking his medication as prescribed. He is not having medication side effects.    Medications      Current Outpatient Medications:     albuterol sulfate  (90 Base) MCG/ACT inhaler, Inhale 2 puffs Every 4 (Four) Hours As Needed for Wheezing., Disp: 18 g, Rfl: 5    allopurinol (ZYLOPRIM) 300 MG tablet, Take 1 tablet by mouth Daily., Disp: 90 tablet, Rfl: 0    ascorbic acid (VITAMIN C) 1000 MG tablet, Take 1 tablet by mouth 2 (Two) Times a Day., Disp: , Rfl:     aspirin 81 MG EC tablet, Take 1 tablet by mouth Daily. 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., Disp: 90 tablet, Rfl: 1    clopidogrel (PLAVIX) 75 MG tablet, Take 1 tablet by mouth Daily for 132 days., Disp: 30 tablet, Rfl: 4    Coenzyme Q10 300 MG capsule, Take 1 capsule by mouth Every Night., Disp: , Rfl:     docusate sodium (COLACE) 100 MG capsule, Take 2 capsules by mouth At Night As Needed for Constipation., Disp: , Rfl:     donepezil (Aricept) 10 MG tablet, Take 1 tablet by mouth Every Night., Disp: 90 tablet, Rfl: 0    Ferrous Gluconate (IRON) 240 (27 FE) MG tablet, Take 1 tablet by mouth Daily., Disp: , Rfl:     finasteride (PROSCAR) 5 MG tablet, TAKE 1 TABLET BY MOUTH EVERY DAY AT BEDTIME (Patient taking differently: Take 1 tablet by mouth Every Night.), Disp: 90 tablet, Rfl: 0    furosemide (LASIX) 20 MG tablet, Take 1 tablet by mouth Daily., Disp: 90 tablet, Rfl: 1    memantine (NAMENDA) 10 MG tablet, Take 1 tablet by mouth 2 (Two) Times a Day., Disp: 60 tablet, Rfl: 5    metFORMIN (GLUCOPHAGE) 1000 MG tablet, Take 1 tablet by mouth twice daily (Patient taking differently: Take 1 tablet by mouth 2 (Two) Times a Day With  Meals.), Disp: 180 tablet, Rfl: 1    methenamine (HIPREX) 1 g tablet, Take 1 tablet by mouth 2 (Two) Times a Day With Meals., Disp: , Rfl:     metoprolol tartrate (LOPRESSOR) 100 MG tablet, Take 1 tablet by mouth 2 (Two) Times a Day., Disp: 180 tablet, Rfl: 3    multivitamin with minerals (MULTIVITAMIN MEN 50+ PO), Take 1 tablet by mouth Every Night. Centrum Sliver, Disp: , Rfl:     omeprazole (priLOSEC) 20 MG capsule, Take 1 capsule by mouth once daily, Disp: 90 capsule, Rfl: 1    pravastatin (PRAVACHOL) 40 MG tablet, Take 1 tablet by mouth once daily, Disp: 90 tablet, Rfl: 0    Probiotic Product (PROBIOTIC ADVANCED PO), Take 1 tablet by mouth 2 (Two) Times a Day., Disp: , Rfl:     sertraline (ZOLOFT) 50 MG tablet, Take 1 tablet by mouth once daily, Disp: 90 tablet, Rfl: 0    tiotropium bromide-olodaterol (STIOLTO RESPIMAT) 2.5-2.5 MCG/ACT aerosol solution inhaler, Inhale 2 puffs Daily. 2 inh once a day, Disp: 1 each, Rfl: 3    valsartan (DIOVAN) 40 MG tablet, Take 1 tablet by mouth Daily., Disp: 90 tablet, Rfl: 1     Allergies    Allergies   Allergen Reactions    Xarelto [Rivaroxaban] GI Bleeding     GI bleed    Penicillins Hives     Has tolerated cefepime, ceftriaxone, cefazolin, cefdinir, cefuroxime, cephalexin       Problem List    Patient Active Problem List   Diagnosis    Atopic rhinitis    Benign (familial) paraproteinemia    Type 2 diabetes mellitus with stage 3 chronic kidney disease, without long-term current use of insulin    Enlarged prostate without lower urinary tract symptoms (luts)    Gastroesophageal reflux disease with esophagitis    Gout    Hyperlipidemia LDL goal <100    Essential hypertension    Nephrolithiasis    Obstructive sleep apnea syndrome    Permanent atrial fibrillation    Stage 3 chronic kidney disease    Long term current use of antiarrhythmic medical therapy    Hydronephrosis    Coronary artery disease involving native coronary artery of native heart with angina pectoris     "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    Physical Examination    /78   Pulse 96   Temp 97.1 °F (36.2 °C) (Infrared)   Resp 18   Ht 190.5 cm (75\")   Wt 119 kg (261 lb 6.4 oz)   BMI 32.67 kg/m²       Neck: Thyroid- non enlarged, symmetric and has no nodules. No bruits are detected. ROM- Normal Range of Motion with no rigidity.    Lungs: Auscultation- Clear to auscultation bilaterally. There are no retractions, clubbing or cyanosis. The Expiratory to Inspiratory ratio is equal.    Cardiovascular: There are no carotid bruits. Heart- Normal Rate with Regular rhythm and no murmurs. There are no gallops. There are no rubs. In the lower extremities there is no edema. The upper extremities do not have edema.    Abdomen: Soft, benign, non-tender with no masses, hernias, organomegaly or scars.    Impression and Assessment    Essential Hypertension.    Plan    Essential Hypertension Plan: The patient was instructed to continue the current medications.    Diagnoses and all orders for this visit:    1. Essential hypertension (Primary)        Return to Office    The patient was instructed to return for follow-up in 1 month. The patient was instructed to return sooner if the condition changes, worsens, or does not resolve.  "

## 2024-03-07 NOTE — PROGRESS NOTES
Electrophysiology Clinic Consult     Darien Camarena  [id number]  1935    Referring Provider: No ref. provider found   PCP: Pito Way MD  [address] [facility] [address] / [facility] [address]    Date of Service: 03/07/24    Chief Complaint   Patient presents with    Coronary Artery Disease    Atrial Fibrillation    Shortness of Breath     Problem List  Persistent atrial fibrillation  Diagnosed August 2012.   FARHAD-guided ECV, 8/17/2012  CHADS-VASc 5 (age > 75, CAD, HTN, DM)  Successful external cardioversion for asymptomatic atrial fibrillation with RVR, 10/25/18  Successful cardioversion for atrial fibrillation RVR, 3/6/19.  Sotalol increased  Minimally symptomatic atrial fibrillation with cardioversion and the ER, 10/31/2019  ED cardioversion for A. fib with RVR, 7/21/2020  ED cardioversion for asymptomatic A. fib with RVR, 12/27/2020   ED cardioversion for asymptomatic A. fib with RVR x 2  occasions, March 2021  Transition from sotalol to amiodarone, 4/14/2021  Successful FARHAD/ECV May 3, 2021: normal LVEF, mild MR  Successful cardioversion, 8/5/2022  Echo 8/22 EF 50%, anatomically and functionally normal valves  Unsuccessful ECV 6/08/2023 - pt converted spontaneously to SR 2 min after ECV  GI Bleed - on Xarelto  CAD   Nuclear MPS 09/15/2022:small-sized, mildly severe area of ischemia located in the apex.  LVEF 56%  [facility] 9/29/2022 - mild CAD  HTN  HLD  DM II  GERD  CKD Stage 3  Gout  STEVEN      History of Present Illness  Darien Camarena is a 88 y.o. male who presents to my electrophysiology clinic for evaluation of atrial fibrillation.  Patient has a history of persistent atrial fibrillation has been on sotalol and amiodarone.  The patient has been on amiodarone for past 2 years or so has unsuccessful cardioversion back in June 2023.  Patient notes that he does not have much symptoms associated with his atrial fibrillation his last ejection fraction was 50%.  Patient does have complication with his  anticoagulation-previously on Xarelto he had a GI bleed.  Now he is self catheterizing himself 3 times a day, and having some hematuria.  Referred to our office for further evaluation regarding A-fib rate control and consideration for left atrial appendage occlusion device implantation    UPDATE 3/2024  S/P Watchman 11/28/2024  Now off of AC  Overall doing well    Review of Systems   Constitutional:  Negative for activity change, fatigue and fever.   Respiratory:  Negative for chest tightness and shortness of breath.    Cardiovascular:  Negative for chest pain, palpitations and leg swelling.   Gastrointestinal:  Negative for constipation and diarrhea.   Genitourinary:  Negative for decreased urine volume, difficulty urinating and hematuria.   Skin:  Negative for wound.   Neurological:  Negative for dizziness, syncope, weakness and light-headedness.   Hematological:  Bruises/bleeds easily.   Psychiatric/Behavioral:  Negative for suicidal ideas.        Outpatient Medications Marked as Taking for the 3/7/24 encounter (Office Visit) with Anthony Mcdaniel MD   Medication Sig Dispense Refill    albuterol sulfate  (90 Base) MCG/ACT inhaler Inhale 2 puffs Every 4 (Four) Hours As Needed for Wheezing. 18 g 5    allopurinol (ZYLOPRIM) 300 MG tablet Take 1 tablet by mouth Daily. 90 tablet 0    ascorbic acid (VITAMIN C) 1000 MG tablet Take 1 tablet by mouth 2 (Two) Times a Day.      aspirin 81 MG EC tablet Take 1 tablet by mouth Daily. 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. 90 tablet 1    clopidogrel (PLAVIX) 75 MG tablet Take 1 tablet by mouth Daily for 132 days. 30 tablet 4    Coenzyme Q10 300 MG capsule Take 1 capsule by mouth Every Night.      docusate sodium (COLACE) 100 MG capsule Take 2 capsules by mouth At Night As Needed for Constipation.      donepezil (Aricept) 10 MG tablet Take 1 tablet by mouth Every Night. 90 tablet 0    Ferrous Gluconate (IRON) 240 (27 FE) MG tablet Take 1 tablet by mouth Daily.       "finasteride (PROSCAR) 5 MG tablet TAKE 1 TABLET BY MOUTH EVERY DAY AT BEDTIME (Patient taking differently: Take 1 tablet by mouth Every Night.) 90 tablet 0    furosemide (LASIX) 20 MG tablet Take 1 tablet by mouth Daily. 90 tablet 1    memantine (NAMENDA) 10 MG tablet Take 1 tablet by mouth 2 (Two) Times a Day. 60 tablet 5    metFORMIN (GLUCOPHAGE) 1000 MG tablet Take 1 tablet by mouth twice daily (Patient taking differently: Take 1 tablet by mouth 2 (Two) Times a Day With Meals.) 180 tablet 1    methenamine (HIPREX) 1 g tablet Take 1 tablet by mouth 2 (Two) Times a Day With Meals.      metoprolol tartrate (LOPRESSOR) 100 MG tablet Take 1 tablet by mouth 2 (Two) Times a Day. 180 tablet 3    multivitamin with minerals (MULTIVITAMIN MEN 50+ PO) Take 1 tablet by mouth Every Night. Centrum Sliver      omeprazole (priLOSEC) 20 MG capsule Take 1 capsule by mouth once daily 90 capsule 1    pravastatin (PRAVACHOL) 40 MG tablet Take 1 tablet by mouth once daily 90 tablet 0    Probiotic Product (PROBIOTIC ADVANCED PO) Take 1 tablet by mouth 2 (Two) Times a Day.      sertraline (ZOLOFT) 50 MG tablet Take 1 tablet by mouth once daily 90 tablet 0    tiotropium bromide-olodaterol (STIOLTO RESPIMAT) 2.5-2.5 MCG/ACT aerosol solution inhaler Inhale 2 puffs Daily. 2 inh once a day 1 each 3    valsartan (DIOVAN) 40 MG tablet Take 1 tablet by mouth Daily. 90 tablet 1       Physical Exam  Vitals:    03/07/24 1311   BP: 146/70   BP Location: Left arm   Patient Position: Sitting   SpO2: 94%   Weight: 116 kg (256 lb)   Height: 190.5 cm (75\")     GENERAL: Well-developed, well-nourished patient in no acute distress.  HEENT: NC, AC, PERRLA. MMM  NECK: No JVD. No carotid bruits auscultated.  LUNGS: Clear to auscultation bilaterally.  CARDIOVASCULAR: IRIR No murmurs, gallops or rubs noted.   ABDOMEN: Soft, nontender. Positive bowel sounds.  MUSCULOSKELETAL: No gross deformities. No clubbing, cyanosis  EXT: pulses intact, No edema  SKIN: Pink, " warm  Neuro: Nonfocal exam. Gait intact    Diagnostic Data    ECG 12 Lead    Date/Time: 3/7/2024 1:30 PM  Performed by: Anthony Mcdaniel MD    Authorized by: Anthony Mcdaniel MD  Rhythm: atrial fibrillation  Rate: normal  QRS axis: normal  Other findings: non-specific ST-T wave changes    Clinical impression: abnormal EKG          Lab Results   Component Value Date    GLUCOSE 222 (H) 01/30/2024    CALCIUM 9.0 01/30/2024     01/30/2024    K 4.2 01/30/2024    CO2 29.0 01/30/2024     01/30/2024    BUN 27 (H) 01/30/2024    CREATININE 0.93 01/30/2024    EGFRIFNONA 79 12/16/2021    BCR 29.0 (H) 01/30/2024    ANIONGAP 6.0 01/30/2024     Lab Results   Component Value Date    WBC 7.85 01/29/2024    HGB 10.6 (L) 01/29/2024    HCT 31.4 (L) 01/29/2024    MCV 98.4 (H) 01/29/2024     01/29/2024     Lab Results   Component Value Date    INR 1.56 (H) 01/03/2024    INR 1.45 (H) 11/29/2023    INR 1.92 (H) 06/21/2022    PROTIME 18.8 (H) 01/03/2024    PROTIME 17.8 (H) 11/29/2023    PROTIME 22.0 (H) 06/21/2022     Lab Results   Component Value Date    TSH 5.200 (H) 01/03/2024       Cardiac Testing:  TTE 9/2022: normal cardiac function 50%    I personally viewed and interpreted the patient's EKG/Telemetry/lab data      Assessment and Plan   Diagnoses and all orders for this visit:    1. Permanent atrial fibrillation (Primary)  -     Adult Transesophageal Echo (FRAHAD) W/ Cont if Necessary Per Protocol; Future    2. Essential hypertension    3. Presence of Watchman left atrial appendage closure device  -     Adult Transesophageal Echo (FARHAD) W/ Cont if Necessary Per Protocol; Future    Other orders  -     ECG 12 Lead        Permanent AF  - groslly asymptomatic; continue rate control  - hx of GIB and hematuria (due to frequent self catheterization), now s/p Watchman procedure and off of AC  - continue DAPT for 6 months from the implant date and then SAPT thereafter.     HTN  - well controlled at home per patient  - continue  metoprolol and valsartan    Body mass index is 32 kg/m².        Follow Up  Return in about 3 months (around 6/7/2024) for Watchman follow up per protocol.    Thank you for allowing me to participate in the care of your patient. Please to not hesitate to contact me with additional questions or concerns.        Anthony Mcdaniel MD  Cardiac Electrophysiologist  [facility] / [facility]

## 2024-03-13 ENCOUNTER — LAB (OUTPATIENT)
Dept: LAB | Facility: HOSPITAL | Age: 89
End: 2024-03-13
Payer: MEDICARE

## 2024-03-13 DIAGNOSIS — I10 ESSENTIAL HYPERTENSION: Chronic | ICD-10-CM

## 2024-03-13 PROCEDURE — 80048 BASIC METABOLIC PNL TOTAL CA: CPT

## 2024-03-14 LAB
ANION GAP SERPL CALCULATED.3IONS-SCNC: 9.6 MMOL/L (ref 5–15)
BUN SERPL-MCNC: 21 MG/DL (ref 8–23)
BUN/CREAT SERPL: 22.3 (ref 7–25)
CALCIUM SPEC-SCNC: 9.3 MG/DL (ref 8.6–10.5)
CHLORIDE SERPL-SCNC: 101 MMOL/L (ref 98–107)
CO2 SERPL-SCNC: 30.4 MMOL/L (ref 22–29)
CREAT SERPL-MCNC: 0.94 MG/DL (ref 0.76–1.27)
EGFRCR SERPLBLD CKD-EPI 2021: 78.5 ML/MIN/1.73
GLUCOSE SERPL-MCNC: 98 MG/DL (ref 65–99)
POTASSIUM SERPL-SCNC: 4.1 MMOL/L (ref 3.5–5.2)
SODIUM SERPL-SCNC: 141 MMOL/L (ref 136–145)

## 2024-03-29 NOTE — Clinical Note
Angiogram of Left Atrial Appendage  Chief complaint:   Chief Complaint   Patient presents with   • Follow-up     Follow up for multiple medical problems. Wakes up with arms/hands tingling. This has been going on for about a week. Sometimes his hands feel cold.       Vitals:  Visit Vitals  /88 (BP Location: RUE - Right upper extremity, Patient Position: Sitting, Cuff Size: Large Adult)   Pulse 91   Resp 18   Ht 5' 6\" (1.676 m)   Wt (!) 138 kg (304 lb 3.8 oz)   SpO2 96%   BMI 49.10 kg/m²       HISTORY OF PRESENT ILLNESS     HPI patient seen for follow-up of multiple medical problems including but not limited to The primary encounter diagnosis was Hyperlipidemia, mixed. 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  Patient states that he has not been following his diet well and has not been taking the medication regularly.  Previous laboratory tests reviewed I do need more current laboratory testing to be done.  Otherwise denies any other acute complaints.    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) 11/04/2022     Priority: Low   • Type 2 diabetes mellitus with hyperglycemia (CMD) 08/03/2021     Priority: Low   • Encounter for long-term (current) use of high-risk medication 02/25/2020     Priority: Low   • Vitamin D deficiency 02/25/2020     Priority: Low    • Elevated liver enzymes 10/31/2019     Priority: Low   • Controlled type 2 diabetes mellitus without complication, without long-term current use of insulin (CMD) 10/31/2019     Priority: Low   • Hyperlipidemia, mixed 10/03/2019     Priority: Low   • Essential hypertension 10/03/2019     Priority: Low   • Hyperglycemia 10/03/2019     Priority: Low   • Overweight 10/03/2019     Priority: Low       PAST MEDICAL, FAMILY AND SOCIAL HISTORY     Medications:  Current Outpatient Medications   Medication Sig Dispense Refill   • Cyanocobalamin-Methylcobalamin 87211 (B12) MCG/2ML Liquid      • rosuvastatin (CRESTOR) 20 MG tablet Take 1 tablet by mouth daily. 90 tablet 3   • dulaglutide (Trulicity) 3 MG/0.5ML pen-injector Inject 3 mg into the skin every 7 days. Indications: Type 2 Diabetes, E11.9 2 mL 11   • glipiZIDE (GLUCOTROL XL) 5 MG 24 hr tablet Take 1 tablet by mouth daily. 90 tablet 3   • metformin (GLUCOPHAGE) 1000 MG tablet Take 1 tablet by mouth in the morning and 1 tablet in the evening. Take with meals. 180 tablet 3   • [START ON 4/1/2024] Vitamin D, Ergocalciferol, 79258 units Cap Take 50,000 Units by mouth 1 day a week. 12 capsule 3   • Easy Comfort Lancets Misc USE TO TEST BLOOD SUGAR 1-2 times a day as directed 100 each 5   • blood glucose meter Test blood sugar 1-2  times daily as directed. 1 kit 5   • blood glucose (FREESTYLE LITE) test strip USE TO TEST BLOOD SUGAR 1-2 TIMES A DAY AS DIRECTED 100 strip 5     No current facility-administered medications for this visit.       Allergies:  ALLERGIES:  No Known Allergies    Past Medical  History/Surgeries:  Past Medical History:   Diagnosis Date   • Hyperglycemia    • Hyperlipidemia    • Sleep apnea        Past Surgical History:   Procedure Laterality Date   • Ir liver biopsy  10/20/2021   • Tonsillectomy      12 y.o       Family History:  Family History   Problem Relation Age of Onset   • Hypertension Mother    • Diabetes Mother    • Patient is unaware of any  medical problems Father    • Patient is unaware of any medical problems Sister        Social History:  Social History     Tobacco Use   • Smoking status: Former     Current packs/day: 0.00     Types: Cigarettes     Quit date: 2017     Years since quittin.4   • Smokeless tobacco: Former   • Tobacco comments:     only at work- rail    Substance Use Topics   • Alcohol use: Yes     Alcohol/week: 14.0 standard drinks of alcohol     Types: 7 Cans of beer, 7 Shots of liquor per week     Comment: 7-14 servings/week       REVIEW OF SYSTEMS     Review of Systems   Constitutional: Negative.  Negative for activity change, appetite change, chills, diaphoresis, fatigue, fever and unexpected weight change.   HENT: Negative.     Eyes: Negative.    Respiratory: Negative.  Negative for cough, choking, chest tightness, shortness of breath, wheezing and stridor.    Cardiovascular: Negative.  Negative for chest pain, palpitations and leg swelling.   Gastrointestinal: Negative.    Endocrine: Negative.    Genitourinary: Negative.    Musculoskeletal: Negative.    Skin: Negative.    Allergic/Immunologic: Negative.    Neurological: Negative.    Psychiatric/Behavioral: Negative.     All other systems reviewed and are negative.      PHYSICAL EXAM     Physical Exam  Vitals reviewed.   Constitutional:       General: He is not in acute distress.     Appearance: He is well-developed. He is obese. He is not ill-appearing or diaphoretic.   HENT:      Head: Normocephalic and atraumatic.      Right Ear: External ear normal.      Left Ear: External ear normal.      Nose: Nose normal. No congestion or rhinorrhea.      Neck: Normal range of motion and neck supple. No rigidity or tenderness.   Eyes:      General: No scleral icterus.        Right eye: No discharge.         Left eye: No discharge.      Conjunctiva/sclera: Conjunctivae normal.   Neck:      Thyroid: No thyromegaly.      Vascular: No carotid bruit or JVD.      Trachea:  No tracheal deviation.   Cardiovascular:      Rate and Rhythm: Normal rate and regular rhythm.      Pulses: Normal pulses.      Heart sounds: Normal heart sounds. No murmur heard.     No friction rub. No gallop.   Pulmonary:      Effort: Pulmonary effort is normal. No respiratory distress.      Breath sounds: Normal breath sounds. No stridor. No wheezing or rales.   Abdominal:      General: Bowel sounds are normal. There is no distension.      Palpations: Abdomen is soft.      Tenderness: There is no abdominal tenderness.   Musculoskeletal:         General: No tenderness. Normal range of motion.      Right lower leg: No edema.      Left lower leg: No edema.   Skin:     General: Skin is warm and dry.      Findings: No erythema or rash.   Neurological:      General: No focal deficit present.      Mental Status: He is alert and oriented to person, place, and time.      Motor: No weakness or abnormal muscle tone.      Coordination: Coordination normal.      Gait: Gait normal.   Psychiatric:         Mood and Affect: Mood normal.         Behavior: Behavior normal.         Thought Content: Thought content normal.         Judgment: Judgment normal.       All pertinent laboratory results were reviewed.    ASSESSMENT/PLAN     Patient seen for follow-up of multiple medical problems that were reviewed and laboratory tests reviewed I do need more current laboratory testing done, maintain a low-carbohydrate diet no refined sugars no red meat.  Exercise on a regular basis.  Recheck again to 5 months  Diagnoses and all orders for this visit:  Hyperlipidemia, mixed you dietary changes and present medication  Need for vaccination  -     COVID Moderna/Spikevax 12+(7586-2372) - [id number]  -     Influenza Quadrivalent Split Pres Free 0.5 mL Pre-filled Vacc (FluLaval, Fluzone, Fluarix; ages 6+ months - [id number]  Essential hypertension continue work on a weight reduction and low-salt diet  Hyperglycemia  Overweight  Controlled type 2 diabetes  mellitus without complication, without long-term current use of insulin (CMD) continue present treatment recheck labs  Vitamin D deficiency  Type 2 diabetes mellitus with hyperglycemia, without long-term current use of insulin (CMD) recheck labs  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

## 2024-04-13 DIAGNOSIS — R41.3 MEMORY LOSS: ICD-10-CM

## 2024-04-15 ENCOUNTER — HOSPITAL ENCOUNTER (OUTPATIENT)
Dept: RADIATION ONCOLOGY | Facility: HOSPITAL | Age: 89
Setting detail: RADIATION/ONCOLOGY SERIES
Discharge: HOME | End: 2024-04-15
Payer: MEDICARE

## 2024-04-15 ENCOUNTER — OFFICE VISIT (OUTPATIENT)
Dept: RADIATION ONCOLOGY | Facility: HOSPITAL | Age: 89
End: 2024-04-15
Payer: MEDICARE

## 2024-04-15 ENCOUNTER — HOSPITAL ENCOUNTER (OUTPATIENT)
Dept: CT IMAGING | Facility: HOSPITAL | Age: 89
Discharge: HOME | End: 2024-04-15
Admitting: RADIOLOGY
Payer: MEDICARE

## 2024-04-15 VITALS
BODY MASS INDEX: 31.91 KG/M2 | RESPIRATION RATE: 16 BRPM | WEIGHT: 255.3 LBS | TEMPERATURE: 97 F | OXYGEN SATURATION: 93 % | SYSTOLIC BLOOD PRESSURE: 145 MMHG | HEART RATE: 93 BPM | DIASTOLIC BLOOD PRESSURE: 93 MMHG

## 2024-04-15 DIAGNOSIS — C34.32 MALIGNANT NEOPLASM OF LOWER LOBE OF LEFT LUNG: ICD-10-CM

## 2024-04-15 DIAGNOSIS — C34.32 MALIGNANT NEOPLASM OF LOWER LOBE OF LEFT LUNG: Primary | ICD-10-CM

## 2024-04-15 PROCEDURE — G0463 HOSPITAL OUTPT CLINIC VISIT: HCPCS

## 2024-04-15 PROCEDURE — 71250 CT THORAX DX C-: CPT

## 2024-04-15 RX ORDER — DONEPEZIL HYDROCHLORIDE 10 MG/1
10 TABLET, FILM COATED ORAL NIGHTLY
Qty: 90 TABLET | Refills: 0 | Status: SHIPPED | OUTPATIENT
Start: 2024-04-15

## 2024-04-15 NOTE — PROGRESS NOTES
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 male with multiple medical comorbidities including COPD, A. Fib on Eliquis, hypertension, CKD, and remote tobacco abuse who was incidentally found to have a 2.2 cm left lower lobe pulmonary nodule on CT scan of the chest for work-up of acute hypoxic respiratory failure secondary to Influenza A infection.  The patient was sent for outpatient PET scan, showing the subsolid left lower lobe nodule to be mildly hypermetabolic and concerning for primary malignancy.  There was otherwise no evidence of hypermetabolic hilar/mediastinal lymphadenopathy or distant metastatic disease.  The patient was uninterested in risks associated with biopsy.  The patient was not considered a candidate for surgery.  The patient underwent definitive empiric treatment with a course of SBRT consisting of 50 Gy in 5 fractions delievered on the [employer] Radixact.  He completed treatments 2023.      Patient reports that he is doing well.  He has some difficulty with some dysphagia and indigestion.  Otherwise the patient has no new issues.  The patient had a CT scan earlier today reports to discuss the findings.      MEDICATIONS:   Current Outpatient Medications:     albuterol sulfate  (90 Base) MCG/ACT inhaler, Inhale 2 puffs Every 4 (Four) Hours As Needed for Wheezing., Disp: 18 g, Rfl: 5    allopurinol (ZYLOPRIM) 300 MG tablet, Take 1 tablet by mouth Daily., Disp: 90 tablet, Rfl: 0    ascorbic acid (VITAMIN C) 1000 MG tablet, Take 1 tablet by mouth 2 (Two) Times a Day., Disp: , Rfl:      aspirin 81 MG EC tablet, Take 1 tablet by mouth Daily. 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., Disp: 90 tablet, Rfl: 1    clopidogrel (PLAVIX) 75 MG tablet, Take 1 tablet by mouth Daily for 132 days., Disp: 30 tablet, Rfl: 4    Coenzyme Q10 300 MG capsule, Take 1 capsule by mouth Every Night., Disp: , Rfl:     docusate sodium (COLACE) 100 MG capsule, Take 2 capsules by mouth At Night As Needed for Constipation., Disp: , Rfl:     donepezil (ARICEPT) 10 MG tablet, TAKE 1 TABLET BY MOUTH ONCE DAILY AT NIGHT, Disp: 90 tablet, Rfl: 0    Ferrous Gluconate (IRON) 240 (27 FE) MG tablet, Take 1 tablet by mouth Daily., Disp: , Rfl:     finasteride (PROSCAR) 5 MG tablet, TAKE 1 TABLET BY MOUTH EVERY DAY AT BEDTIME (Patient taking differently: Take 1 tablet by mouth Every Night.), Disp: 90 tablet, Rfl: 0    furosemide (LASIX) 20 MG tablet, Take 1 tablet by mouth Daily., Disp: 90 tablet, Rfl: 1    memantine (NAMENDA) 10 MG tablet, Take 1 tablet by mouth 2 (Two) Times a Day., Disp: 60 tablet, Rfl: 5    metFORMIN (GLUCOPHAGE) 1000 MG tablet, Take 1 tablet by mouth twice daily, Disp: 180 tablet, Rfl: 1    methenamine (HIPREX) 1 g tablet, Take 1 tablet by mouth 2 (Two) Times a Day With Meals., Disp: , Rfl:     metoprolol tartrate (LOPRESSOR) 100 MG tablet, Take 1 tablet by mouth 2 (Two) Times a Day., Disp: 180 tablet, Rfl: 3    multivitamin with minerals (MULTIVITAMIN MEN 50+ PO), Take 1 tablet by mouth Every Night. Centrum Sliver, Disp: , Rfl:     omeprazole (priLOSEC) 20 MG capsule, Take 1 capsule by mouth once daily, Disp: 90 capsule, Rfl: 1    pravastatin (PRAVACHOL) 40 MG tablet, Take 1 tablet by mouth once daily, Disp: 90 tablet, Rfl: 0    Probiotic Product (PROBIOTIC ADVANCED PO), Take 1 tablet by mouth 2 (Two) Times a Day., Disp: , Rfl:     sertraline (ZOLOFT) 50 MG tablet, Take 1 tablet by mouth once daily, Disp: 90 tablet, Rfl: 0    tiotropium bromide-olodaterol (STIOLTO RESPIMAT) 2.5-2.5 MCG/ACT aerosol solution  inhaler, Inhale 2 puffs Daily. 2 inh once a day, Disp: 1 each, Rfl: 3    valsartan (DIOVAN) 40 MG tablet, Take 1 tablet by mouth Daily., Disp: 90 tablet, Rfl: 1      Past Medical History:   Diagnosis Date    Arrhythmia     Atrial fibrillation     Bilateral leg cramps     possible new medication related side effects    Chronic kidney disease     Clotting disorder     pt doesnt know about this    COPD (chronic obstructive pulmonary disease)     Coronary artery disease     Diabetes mellitus     doesnt check sugar    E. coli sepsis 06/23/2022    Enlarged prostate without lower urinary tract symptoms (luts) 06/20/2016    Full dentures     GERD (gastroesophageal reflux disease)     Gout     Hearing aid worn     bilat prn    History of colonoscopy 09/12/2012    History of radiation therapy 02/24/2023    SBRT LLL lung    [place] (hard of hearing)     hearing aids prn    Hyperlipidemia     Hypertension     Kidney stone     surgery x1    Lung cancer     STEVEN on CPAP     compliant with machine    PAF (paroxysmal atrial fibrillation)     Prostatism     Sleep apnea     CPAP HS    Urinary incontinence     Urinary tract infection     Wears glasses     readers     Past Surgical History:   Procedure Laterality Date    ATRIAL APPENDAGE EXCLUSION LEFT WITH TRANSESOPHAGEAL ECHOCARDIOGRAM N/A 11/28/2023    Procedure: Atrial Appendage Occlusion;  Surgeon: Anthony Mcdaniel MD;  Location:  [facility];  Service: Cardiovascular;  Laterality: N/A;    CARDIAC CATHETERIZATION Left 09/29/2022    Procedure: Left Heart Cath;  Surgeon: Titus Oliveros IV, MD;  Location:  [facility];  Service: Cardiovascular;  Laterality: Left;    CARDIOVERSION      CATARACT EXTRACTION Bilateral     COLONOSCOPY      CYSTOSCOPY      ENDOSCOPY      possible    KIDNEY STONE SURGERY      x1     Breast Cancer-related family history is not on file.  Social History     Socioeconomic History    Marital status:    Tobacco Use     Smoking status: Former     Current packs/day: 0.00     Average packs/day: 1 pack/day for 15.0 years (15.0 ttl pk-yrs)     Types: Cigarettes     Start date: 1947     Quit date: 1960     Years since quittin.9     Passive exposure: Past    Smokeless tobacco: Former     Types: Chew     Quit date:     Tobacco comments:     started at 15 yo.   Vaping Use    Vaping status: Never Used    Passive vaping exposure: Yes   Substance and Sexual Activity    Alcohol use: No    Drug use: Never    Sexual activity: Not Currently     Partners: Female         Review of Systems   HENT:   Positive for trouble swallowing.    Respiratory:  Positive for cough and shortness of breath.    Cardiovascular:  Positive for leg swelling (improved).   All other systems reviewed and are negative.      Karnofsky score: 80   KPS 80%        Physical Exam  Vitals and nursing note reviewed.   Constitutional:       General: He is not in acute distress.     Appearance: Normal appearance. He is well-developed.   HENT:      Head: Normocephalic and atraumatic.   Eyes:      Conjunctiva/sclera: Conjunctivae normal.      Pupils: Pupils are equal, round, and reactive to light.   Cardiovascular:      Rate and Rhythm: Normal rate and regular rhythm.      Heart sounds: No murmur heard.     No friction rub.   Pulmonary:      Effort: Pulmonary effort is normal.      Breath sounds: Normal breath sounds. No wheezing.   Chest:      Chest wall: No tenderness.   Musculoskeletal:         General: Normal range of motion.      Cervical back: Normal range of motion and neck supple.   Lymphadenopathy:      Cervical: No cervical adenopathy.   Skin:     General: Skin is warm and dry.   Neurological:      Mental Status: He is alert and oriented to person, place, and time.   Psychiatric:         Behavior: Behavior normal.         Thought Content: Thought content normal.         Judgment: Judgment normal.         VITAL SIGNS:   Vitals:    04/15/24 1359   BP: 145/93    Pulse: 93   Resp: 16   Temp: 97 °F (36.1 °C)   TempSrc: Temporal   SpO2: 93%   Weight: 116 kg (255 lb 4.8 oz)   PainSc: 0-No pain           The following portions of the patient's history were reviewed and updated as appropriate: allergies, current medications, past family history, past medical history, past social history, past surgical history and problem list.    I reviewed the patient's scan from today.  The report is not yet available.  The patient has a stable treated area with no evidence of recurrence or distant disease.  We will await the final report  CT Angiogram Chest    Result Date: 1/3/2024  Impression: Negative exam for pulmonary embolism. Chronic infiltrate of the left lower lobe. No acute process. Electronically Signed: Aster Mathur MD  1/3/2024 4:48 PM EST  Workstation ID: [id number]      I reviewed the patient's pretreatment plans as well as the patient's treatment plan.       There are no diagnoses linked to this encounter.       IMPRESSION:  Darien Camarena is an 88 y.o. gentleman with suspected early stage primary bronchogenic carcinoma of the left lower lobe of lung.  The patient was uninterested in biopsy.  The patient underwent definitive, empiric SBRT to this lesion.  The patient had a good response with evidence of fibrosis around the lesion.    Recommend repeat CT scan in December.      I spent 30 minutes on the patient's case today.    RECOMMENDATIONS:    Left lower lobe primary bronchogenic carcinoma  -cT1b N0 M0   -Radiographic diagnosis  -Hypermetabolic on PET scan  -No evidence of regional or distant metastatic disease  -Patient uninterested in biopsy  -Status post definitive empiric SBRT on the Radixact, 50 Gy in 5 fractions   -completed 2/24/2023  -CT scan shows no evidence of disease progression with interval treatment response.  -Clinic follow-up with repeat CT scan in December    COPD  -Continues daily Stilolto, albuterol as needed  -Follows with Dr. Eagle    No follow-ups on  file.    Abbe Leary MD

## 2024-04-16 DIAGNOSIS — C34.32 MALIGNANT NEOPLASM OF LOWER LOBE OF LEFT LUNG: Primary | ICD-10-CM

## 2024-04-17 ENCOUNTER — OFFICE VISIT (OUTPATIENT)
Dept: SLEEP MEDICINE | Facility: CLINIC | Age: 89
End: 2024-04-17
Payer: MEDICARE

## 2024-04-17 ENCOUNTER — OFFICE VISIT (OUTPATIENT)
Dept: INTERNAL MEDICINE | Facility: CLINIC | Age: 89
End: 2024-04-17
Payer: MEDICARE

## 2024-04-17 VITALS
DIASTOLIC BLOOD PRESSURE: 78 MMHG | OXYGEN SATURATION: 91 % | HEIGHT: 75 IN | TEMPERATURE: 97.3 F | SYSTOLIC BLOOD PRESSURE: 124 MMHG | BODY MASS INDEX: 32.58 KG/M2 | HEART RATE: 67 BPM | WEIGHT: 262 LBS

## 2024-04-17 VITALS
BODY MASS INDEX: 31.67 KG/M2 | SYSTOLIC BLOOD PRESSURE: 148 MMHG | RESPIRATION RATE: 16 BRPM | DIASTOLIC BLOOD PRESSURE: 88 MMHG | TEMPERATURE: 97.6 F | WEIGHT: 253.38 LBS | HEART RATE: 78 BPM

## 2024-04-17 DIAGNOSIS — E66.9 OBESITY, UNSPECIFIED CLASSIFICATION, UNSPECIFIED OBESITY TYPE, UNSPECIFIED WHETHER SERIOUS COMORBIDITY PRESENT: ICD-10-CM

## 2024-04-17 DIAGNOSIS — G47.33 OSA (OBSTRUCTIVE SLEEP APNEA): Primary | ICD-10-CM

## 2024-04-17 DIAGNOSIS — R40.0 SOMNOLENCE, DAYTIME: ICD-10-CM

## 2024-04-17 DIAGNOSIS — R43.2 LOSS OF TASTE: ICD-10-CM

## 2024-04-17 DIAGNOSIS — G47.33 OBSTRUCTIVE SLEEP APNEA SYNDROME: ICD-10-CM

## 2024-04-17 DIAGNOSIS — B37.0 ORAL CANDIDIASIS: Primary | ICD-10-CM

## 2024-04-17 LAB
EXPIRATION DATE: NORMAL
INTERNAL CONTROL: NORMAL
Lab: NORMAL
SARS-COV-2 AG UPPER RESP QL IA.RAPID: NOT DETECTED

## 2024-04-17 PROCEDURE — 99213 OFFICE O/P EST LOW 20 MIN: CPT | Performed by: INTERNAL MEDICINE

## 2024-04-17 PROCEDURE — 1160F RVW MEDS BY RX/DR IN RCRD: CPT | Performed by: INTERNAL MEDICINE

## 2024-04-17 PROCEDURE — 99214 OFFICE O/P EST MOD 30 MIN: CPT | Performed by: NURSE PRACTITIONER

## 2024-04-17 PROCEDURE — 1160F RVW MEDS BY RX/DR IN RCRD: CPT | Performed by: NURSE PRACTITIONER

## 2024-04-17 PROCEDURE — 1159F MED LIST DOCD IN RCRD: CPT | Performed by: NURSE PRACTITIONER

## 2024-04-17 PROCEDURE — 1159F MED LIST DOCD IN RCRD: CPT | Performed by: INTERNAL MEDICINE

## 2024-04-17 PROCEDURE — 87426 SARSCOV CORONAVIRUS AG IA: CPT | Performed by: INTERNAL MEDICINE

## 2024-04-17 RX ORDER — FINASTERIDE 5 MG/1
5 TABLET, FILM COATED ORAL
Qty: 90 TABLET | Refills: 0 | Status: SHIPPED | OUTPATIENT
Start: 2024-04-17

## 2024-04-17 NOTE — PROGRESS NOTES
New Sleep Patient Office Visit      Patient Name: Darien Camarena    Referring Physician: No ref. provider found    Chief Complaint:    Chief Complaint   Patient presents with    Sleeping Problem    Frequent Awakenings       History of Present Illness: Darien Camarena is a 88 y.o. male who is here today to re-establish care with Sleep Medicine.     The patient has a history of STEVEN on PAP therapy for over 25 years.    He is accompanied by his daughter who is concerned that his current therapy is no longer sufficient and is worried that his machine is out of date.    The patient does have more pronounced daytime somnolence and lethargy than in the past.      Denies any vivid dreams, nightmares, or uncontrolled movements of the extremities.     His sleep pattern is relatively consistent throughout the week and he averages 8 hours per night.    He is retired.    Denies any alcohol, tobacco, illicit drug use, prescribed stimulants, or caffeine.  He is not prescribed any sedating medications.    He is accompanied today by his daughter.    Denies fever, chills, night sweats, or hemoptysis. No recent sick contacts. No chest pain or palpitations. Denies lower extremity swelling or calf tenderness.     Patient exhibits the following signs and symptoms of sleep disordered breathing: daytime sleepiness.    Patient has been experiencing symptoms for multiple year(s).    Patient's sleepiness has been evaluated with an [place] Sleepiness Scale documented below.                                                     [place] Sleepiness Scale    Situation Chance of Dozing or Sleeping   Sitting and reading 3 - high chance of dosing or sleeping   Watching TV 3 - high chance of dosing or sleeping   Sitting inactive in a public place 3 - high chance of dosing or sleeping   Being a passenger in a motor vehicle for an hour or more 3 - high chance of dosing or sleeping   Lying down in the afternoon 3 - high chance of dosing or sleeping    Sitting and talking to someone 3 - high chance of dosing or sleeping   Sitting quietly after lunch (no alcohol) 3 - high chance of dosing or sleeping   Stopped for a few minutes in traffic while driving 1 - slight chance of dosing or sleeping   Total score (add the scores up) 22      Patient is suspected to have Obstructive sleep apnea (adult) (pediatric) G47.33.    Subjective      Review of Systems:   Review of Systems    Past Medical History:   Past Medical History:   Diagnosis Date    Arrhythmia     Atrial fibrillation     Bilateral leg cramps     possible new medication related side effects    Chronic kidney disease     Clotting disorder     pt doesnt know about this    COPD (chronic obstructive pulmonary disease)     Coronary artery disease     Diabetes mellitus     doesnt check sugar    E. coli sepsis 06/23/2022    Enlarged prostate without lower urinary tract symptoms (luts) 06/20/2016    Full dentures     GERD (gastroesophageal reflux disease)     Gout     Hearing aid worn     bilat prn    History of colonoscopy 09/12/2012    History of radiation therapy 02/24/2023    SBRT LLL lung    [place] (hard of hearing)     hearing aids prn    Hyperlipidemia     Hypertension     Kidney stone     surgery x1    Lung cancer     STEVEN on CPAP     compliant with machine    PAF (paroxysmal atrial fibrillation)     Prostatism     Sleep apnea     CPAP HS    Urinary incontinence     Urinary tract infection     Wears glasses     readers       Past Surgical History:   Past Surgical History:   Procedure Laterality Date    ATRIAL APPENDAGE EXCLUSION LEFT WITH TRANSESOPHAGEAL ECHOCARDIOGRAM N/A 11/28/2023    Procedure: Atrial Appendage Occlusion;  Surgeon: Anthony Mcdaniel MD;  Location:  [facility];  Service: Cardiovascular;  Laterality: N/A;    CARDIAC CATHETERIZATION Left 09/29/2022    Procedure: Left Heart Cath;  Surgeon: Titus Oliveros IV, MD;  Location:  [facility];  Service:  Cardiovascular;  Laterality: Left;    CARDIOVERSION      CATARACT EXTRACTION Bilateral     COLONOSCOPY      CYSTOSCOPY      ENDOSCOPY      possible    KIDNEY STONE SURGERY      x1       Family History:   Family History   Problem Relation Age of Onset    Alzheimer's disease Mother     COPD Father     Lung cancer Father     Cancer Father     Kidney disease Father     No Known Problems Daughter     No Known Problems Daughter     No Known Problems Daughter        Social History:   Social History     Socioeconomic History    Marital status:    Tobacco Use    Smoking status: Former     Current packs/day: 0.00     Average packs/day: 1 pack/day for 15.0 years (15.0 ttl pk-yrs)     Types: Cigarettes     Start date: 1947     Quit date: 1960     Years since quittin.9     Passive exposure: Past    Smokeless tobacco: Former     Types: Chew     Quit date:     Tobacco comments:     started at 13 yo.   Vaping Use    Vaping status: Never Used    Passive vaping exposure: Yes   Substance and Sexual Activity    Alcohol use: No    Drug use: Never    Sexual activity: Not Currently     Partners: Female       Medications:     Current Outpatient Medications:     albuterol sulfate  (90 Base) MCG/ACT inhaler, Inhale 2 puffs Every 4 (Four) Hours As Needed for Wheezing., Disp: 18 g, Rfl: 5    allopurinol (ZYLOPRIM) 300 MG tablet, Take 1 tablet by mouth Daily., Disp: 90 tablet, Rfl: 0    ascorbic acid (VITAMIN C) 1000 MG tablet, Take 1 tablet by mouth 2 (Two) Times a Day., Disp: , Rfl:     aspirin 81 MG EC tablet, Take 1 tablet by mouth Daily. 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., Disp: 90 tablet, Rfl: 1    clopidogrel (PLAVIX) 75 MG tablet, Take 1 tablet by mouth Daily for 132 days., Disp: 30 tablet, Rfl: 4    Coenzyme Q10 300 MG capsule, Take 1 capsule by mouth Every Night., Disp: , Rfl:     docusate sodium (COLACE) 100 MG capsule, Take 2 capsules by mouth At Night As Needed for Constipation., Disp: , Rfl:      donepezil (ARICEPT) 10 MG tablet, TAKE 1 TABLET BY MOUTH ONCE DAILY AT NIGHT, Disp: 90 tablet, Rfl: 0    Ferrous Gluconate (IRON) 240 (27 FE) MG tablet, Take 1 tablet by mouth Daily., Disp: , Rfl:     finasteride (PROSCAR) 5 MG tablet, TAKE 1 TABLET BY MOUTH ONCE DAILY AT BEDTIME, Disp: 90 tablet, Rfl: 0    furosemide (LASIX) 20 MG tablet, Take 1 tablet by mouth Daily., Disp: 90 tablet, Rfl: 1    memantine (NAMENDA) 10 MG tablet, Take 1 tablet by mouth 2 (Two) Times a Day., Disp: 60 tablet, Rfl: 5    metFORMIN (GLUCOPHAGE) 1000 MG tablet, Take 1 tablet by mouth twice daily, Disp: 180 tablet, Rfl: 1    methenamine (HIPREX) 1 g tablet, Take 1 tablet by mouth 2 (Two) Times a Day With Meals., Disp: , Rfl:     metoprolol tartrate (LOPRESSOR) 100 MG tablet, Take 1 tablet by mouth 2 (Two) Times a Day., Disp: 180 tablet, Rfl: 3    multivitamin with minerals (MULTIVITAMIN MEN 50+ PO), Take 1 tablet by mouth Every Night. Centrum Sliver, Disp: , Rfl:     omeprazole (priLOSEC) 20 MG capsule, Take 1 capsule by mouth once daily, Disp: 90 capsule, Rfl: 1    pravastatin (PRAVACHOL) 40 MG tablet, Take 1 tablet by mouth once daily, Disp: 90 tablet, Rfl: 0    Probiotic Product (PROBIOTIC ADVANCED PO), Take 1 tablet by mouth 2 (Two) Times a Day., Disp: , Rfl:     sertraline (ZOLOFT) 50 MG tablet, Take 1 tablet by mouth once daily, Disp: 90 tablet, Rfl: 0    tiotropium bromide-olodaterol (STIOLTO RESPIMAT) 2.5-2.5 MCG/ACT aerosol solution inhaler, Inhale 2 puffs Daily. 2 inh once a day, Disp: 1 each, Rfl: 3    valsartan (DIOVAN) 40 MG tablet, Take 1 tablet by mouth Daily., Disp: 90 tablet, Rfl: 1    Allergies:   Allergies   Allergen Reactions    Xarelto [Rivaroxaban] GI Bleeding     GI bleed    Penicillins Hives     Has tolerated cefepime, ceftriaxone, cefazolin, cefdinir, cefuroxime, cephalexin       Objective     Physical Exam:  Vital Signs:   Vitals:    04/17/24 1019   BP: 124/78   Pulse: 67   Temp: 97.3 °F (36.3 °C)   SpO2: 91%  "  Weight: 119 kg (262 lb)   Height: 190.5 cm (75\")     Body mass index is 32.75 kg/m².    Physical Exam    Results Review:   I reviewed the patient's new clinical results.  Lab Results   Component Value Date    TSH 5.200 (H) 01/03/2024       Assessment / Plan      Assessment:   Problem List Items Addressed This Visit       Obstructive sleep apnea syndrome    Overview     Compliant with CPAP         Obesity    Somnolence, daytime     Other Visit Diagnoses       STEVEN (obstructive sleep apnea)    -  Primary    Relevant Orders    Home Sleep Study          Mr. Camarena was seen today to reestablish care with sleep medicine.  He does carry a history of obstructive sleep apnea on PAP therapy, however he is been on the same machine for greater than 25 years and does have worsening daytime somnolence and lethargy.    Plan:      STEVEN  Daytime somnolence  Obesity   The patient does have a prior history of STEVEN.  Due to the daughter's concern regarding his excessive daytime sleepiness and lethargy I am concerned that he is retaining CO2 and that his current PAP therapy is not sufficient.  He does have an elevated serum bicarb on serial BMPs; prior ABG in 2022 did show a compensated respiratory acidosis with a pCO2 51.4  At this time we will proceed with a repeat home sleep study to requalify him for a new PAP therapy  He is currently unaware of what his current setting is, however he feels it is either 4-5 cm H2O which is likely not sufficient  If the study confirms sleep apnea, initiate PAP therapy with a new device and follow-up in 31-90 days for compliance check.   If his symptoms are ongoing despite the new PAP therapy, we may need to obtain an ABG to determine if he is hypercapnic as he may require BiPAP therapy  Body mass index is 32.75 kg/m². I did encourage weight loss as this can contribute towards STEVEN.   Explained that untreated sleep apnea can contribute to additional health problems including cardiac and metabolic issues. "   We also discussed good sleep regimen such as laying in the lateral position, avoiding alcohol or sedatives prior to bedtime, regular exercise, weight loss, avoiding caffeine in the evenings, and going to bed and getting up at the same time every day.     Follow Up:   Return in about 3 months (around 7/17/2024).    Electronically signed by BERYL Perla, 04/17/24, 11:59 AM EDT.    Discussed plan of care in detail with patient today. Patient verbally understands and agrees. I spent 40 minutes on this date of service. This time includes time spent by me in the following activities:preparing for the visit, reviewing tests, obtaining and/or reviewing a separately obtained history, performing a medically appropriate examination, counseling the patient/family/caregiver, ordering medications, tests, or procedures, and/or documenting information in the medical record. This time excludes other separate billable services such as interpretation of tests or procedures, if applicable.

## 2024-04-17 NOTE — PROGRESS NOTES
Subjective       Darien Camarena is a 88 y.o. male.     Chief Complaint   Patient presents with    Mouth Lesions     White Sores in mouth    Loss Of Smell     2-3 weeks       History obtained from the patient and his daughter.      Mouth Lesions   Episode onset: 3 weeks ago. The onset was sudden. The problem has been unchanged. Nothing relieves the symptoms. Associated symptoms include congestion (chest), mouth sores (tongue was white and painful, now resolved, but loss of taste has persisted) and cough (mild). Pertinent negatives include no fever, no abdominal pain, no diarrhea, no nausea, no vomiting, no ear pain, no headaches, no rhinorrhea, no sore throat, no swollen glands, no muscle aches, no neck pain, no neck stiffness, no wheezing and no rash. He has been Eating less than usual and drinking less than usual. There were no sick contacts. He has received no recent medical care. Services performed: Nystatin called in by phone by patient's daughter who is an APRN.      The patient denies recent antibiotic treatment.  He has not done a home COVID test.    The following portions of the patient's history were reviewed and updated as appropriate: allergies, current medications, past family history, past medical history, past social history, past surgical history, and problem list.      Review of Systems   Constitutional:  Negative for appetite change (nothing tastes good), chills and fever.   HENT:  Positive for congestion (chest) and mouth sores (tongue was white and painful, now resolved, but loss of taste has persisted). Negative for ear pain, rhinorrhea and sore throat.    Respiratory:  Positive for cough (mild). Negative for shortness of breath and wheezing.    Cardiovascular:  Negative for chest pain.   Gastrointestinal:  Negative for abdominal pain, diarrhea, nausea and vomiting.   Musculoskeletal:  Negative for neck pain and neck stiffness.   Skin:  Negative for rash.   Neurological:  Negative for  headaches.   Hematological:  Negative for adenopathy.           Objective     Blood pressure 148/88, pulse 78, temperature 97.6 °F (36.4 °C), temperature source Infrared, resp. rate 16, weight 115 kg (253 lb 6 oz).    Physical Exam  Vitals and nursing note reviewed.   Constitutional:       Appearance: He is well-developed and normal weight.   HENT:      Head: Normocephalic and atraumatic.      Comments: No maxillary or frontal sinus tenderness to palpation.     Right Ear: Tympanic membrane, ear canal and external ear normal.      Left Ear: Tympanic membrane, ear canal and external ear normal.      Mouth/Throat:      Mouth: Mucous membranes are moist. No oral lesions.      Pharynx: Oropharynx is clear.      Comments: Tonsils normal.  Eyes:      Conjunctiva/sclera: Conjunctivae normal.   Cardiovascular:      Rate and Rhythm: Normal rate and regular rhythm.      Heart sounds: Normal heart sounds. No murmur heard.  Pulmonary:      Effort: Pulmonary effort is normal.      Breath sounds: Normal breath sounds.   Musculoskeletal:      Cervical back: Normal range of motion and neck supple.   Lymphadenopathy:      Cervical: No cervical adenopathy.   Skin:     Findings: No rash.   Neurological:      Mental Status: He is alert.   Psychiatric:         Mood and Affect: Mood normal.         Results for orders placed or performed in visit on 04/17/24   POCT SARS-CoV-2 Antigen    Specimen: Nasopharynx; Swab   Result Value Ref Range    SARS Antigen Not Detected Not Detected, Presumptive Negative    Internal Control Passed Passed    Lot Number [id number]     Expiration Date 7/11/24        Assessment & Plan   Diagnoses and all orders for this visit:    1. Oral candidiasis (Primary)  -     nystatin (MYCOSTATIN) 100,000 unit/mL suspension; Swish and swallow 5 mL 3 (Three) Times a Day for 7 days.  Dispense: 105 mL; Refill: 0   If no improvement after second Nystatin treatment, would recommend referral to to ENT.    2. Loss of taste  -      POCT SARS-CoV-2 Antigen          Return if symptoms worsen or fail to improve.

## 2024-04-19 ENCOUNTER — PATIENT ROUNDING (BHMG ONLY) (OUTPATIENT)
Dept: PULMONOLOGY | Facility: CLINIC | Age: 89
End: 2024-04-19
Payer: MEDICARE

## 2024-04-19 NOTE — PROGRESS NOTES
April 19, 2024    Hello, may I speak with Darien Camarena?    My name is lynne       I am  with [facility]  [address] [place] [id number] [phone].    Before we get started may I verify your date of birth? 1935    I am calling to officially welcome you to our practice and ask about your recent visit. Is this a good time to talk? yes    Tell me about your visit with us. What things went well?  yes things were great        We're always looking for ways to make our patients' experiences even better. Do you have recommendations on ways we may improve?  no    Overall were you satisfied with your first visit to our practice? yes       I appreciate you taking the time to speak with me today. Is there anything else I can do for you? no      Thank you, and have a great day.

## 2024-05-03 DIAGNOSIS — E78.5 HYPERLIPIDEMIA LDL GOAL <100: ICD-10-CM

## 2024-05-03 RX ORDER — PRAVASTATIN SODIUM 40 MG
40 TABLET ORAL DAILY
Qty: 90 TABLET | Refills: 0 | Status: SHIPPED | OUTPATIENT
Start: 2024-05-03

## 2024-05-03 RX ORDER — FINASTERIDE 5 MG/1
5 TABLET, FILM COATED ORAL
Qty: 90 TABLET | Refills: 0 | Status: SHIPPED | OUTPATIENT
Start: 2024-05-03

## 2024-05-07 ENCOUNTER — OFFICE VISIT (OUTPATIENT)
Dept: INTERNAL MEDICINE | Facility: CLINIC | Age: 89
End: 2024-05-07
Payer: MEDICARE

## 2024-05-07 VITALS
HEART RATE: 96 BPM | TEMPERATURE: 98 F | BODY MASS INDEX: 33.8 KG/M2 | HEIGHT: 73 IN | WEIGHT: 255 LBS | SYSTOLIC BLOOD PRESSURE: 126 MMHG | RESPIRATION RATE: 16 BRPM | DIASTOLIC BLOOD PRESSURE: 86 MMHG

## 2024-05-07 DIAGNOSIS — I48.21 PERMANENT ATRIAL FIBRILLATION: ICD-10-CM

## 2024-05-07 DIAGNOSIS — Z00.00 PHYSICAL EXAM: ICD-10-CM

## 2024-05-07 DIAGNOSIS — I10 ESSENTIAL HYPERTENSION: ICD-10-CM

## 2024-05-07 DIAGNOSIS — G31.84 MILD COGNITIVE IMPAIRMENT: ICD-10-CM

## 2024-05-07 DIAGNOSIS — R31.9 HEMATURIA, UNSPECIFIED TYPE: ICD-10-CM

## 2024-05-07 DIAGNOSIS — Z00.00 MEDICARE ANNUAL WELLNESS VISIT, SUBSEQUENT: Primary | ICD-10-CM

## 2024-05-07 DIAGNOSIS — R82.998 LEUKOCYTES IN URINE: ICD-10-CM

## 2024-05-07 DIAGNOSIS — E11.22 TYPE 2 DIABETES MELLITUS WITH STAGE 3A CHRONIC KIDNEY DISEASE, WITHOUT LONG-TERM CURRENT USE OF INSULIN: ICD-10-CM

## 2024-05-07 DIAGNOSIS — E78.5 HYPERLIPIDEMIA LDL GOAL <100: Chronic | ICD-10-CM

## 2024-05-07 DIAGNOSIS — N18.31 TYPE 2 DIABETES MELLITUS WITH STAGE 3A CHRONIC KIDNEY DISEASE, WITHOUT LONG-TERM CURRENT USE OF INSULIN: ICD-10-CM

## 2024-05-07 DIAGNOSIS — K21.00 GASTROESOPHAGEAL REFLUX DISEASE WITH ESOPHAGITIS WITHOUT HEMORRHAGE: Chronic | ICD-10-CM

## 2024-05-07 LAB
ALBUMIN SERPL-MCNC: 3.5 G/DL (ref 3.5–5.2)
ALBUMIN/GLOB SERPL: 1.3 G/DL
ALP SERPL-CCNC: 171 U/L (ref 39–117)
ALT SERPL W P-5'-P-CCNC: 18 U/L (ref 1–41)
ANION GAP SERPL CALCULATED.3IONS-SCNC: 7 MMOL/L (ref 5–15)
AST SERPL-CCNC: 21 U/L (ref 1–40)
BASOPHILS # BLD AUTO: 0.05 10*3/MM3 (ref 0–0.2)
BASOPHILS NFR BLD AUTO: 0.9 % (ref 0–1.5)
BILIRUB BLD-MCNC: NEGATIVE MG/DL
BILIRUB SERPL-MCNC: 0.4 MG/DL (ref 0–1.2)
BUN SERPL-MCNC: 25 MG/DL (ref 8–23)
BUN/CREAT SERPL: 31.3 (ref 7–25)
CALCIUM SPEC-SCNC: 8.7 MG/DL (ref 8.6–10.5)
CHLORIDE SERPL-SCNC: 108 MMOL/L (ref 98–107)
CHOLEST SERPL-MCNC: 75 MG/DL (ref 0–200)
CLARITY, POC: ABNORMAL
CO2 SERPL-SCNC: 28 MMOL/L (ref 22–29)
COLOR UR: ABNORMAL
CREAT SERPL-MCNC: 0.8 MG/DL (ref 0.76–1.27)
DEPRECATED RDW RBC AUTO: 48.3 FL (ref 37–54)
EGFRCR SERPLBLD CKD-EPI 2021: 85.7 ML/MIN/1.73
EOSINOPHIL # BLD AUTO: 0.24 10*3/MM3 (ref 0–0.4)
EOSINOPHIL NFR BLD AUTO: 4.5 % (ref 0.3–6.2)
ERYTHROCYTE [DISTWIDTH] IN BLOOD BY AUTOMATED COUNT: 13.8 % (ref 12.3–15.4)
EXPIRATION DATE: ABNORMAL
EXPIRATION DATE: NORMAL
GLOBULIN UR ELPH-MCNC: 2.8 GM/DL
GLUCOSE SERPL-MCNC: 93 MG/DL (ref 65–99)
GLUCOSE UR STRIP-MCNC: NEGATIVE MG/DL
HBA1C MFR BLD: 5.9 % (ref 4.8–5.6)
HCT VFR BLD AUTO: 29.9 % (ref 37.5–51)
HDLC SERPL-MCNC: 26 MG/DL (ref 40–60)
HGB BLD-MCNC: 9.3 G/DL (ref 13–17.7)
IMM GRANULOCYTES # BLD AUTO: 0.02 10*3/MM3 (ref 0–0.05)
IMM GRANULOCYTES NFR BLD AUTO: 0.4 % (ref 0–0.5)
KETONES UR QL: NEGATIVE
LDLC SERPL CALC-MCNC: 32 MG/DL (ref 0–100)
LDLC/HDLC SERPL: 1.25 {RATIO}
LEUKOCYTE EST, POC: ABNORMAL
LYMPHOCYTES # BLD AUTO: 1 10*3/MM3 (ref 0.7–3.1)
LYMPHOCYTES NFR BLD AUTO: 18.6 % (ref 19.6–45.3)
Lab: ABNORMAL
Lab: NORMAL
MCH RBC QN AUTO: 30.1 PG (ref 26.6–33)
MCHC RBC AUTO-ENTMCNC: 31.1 G/DL (ref 31.5–35.7)
MCV RBC AUTO: 96.8 FL (ref 79–97)
MONOCYTES # BLD AUTO: 0.37 10*3/MM3 (ref 0.1–0.9)
MONOCYTES NFR BLD AUTO: 6.9 % (ref 5–12)
NEUTROPHILS NFR BLD AUTO: 3.71 10*3/MM3 (ref 1.7–7)
NEUTROPHILS NFR BLD AUTO: 68.7 % (ref 42.7–76)
NITRITE UR-MCNC: POSITIVE MG/ML
NRBC BLD AUTO-RTO: 0 /100 WBC (ref 0–0.2)
PH UR: 5 [PH] (ref 5–8)
PLATELET # BLD AUTO: 225 10*3/MM3 (ref 140–450)
PMV BLD AUTO: 10.4 FL (ref 6–12)
POC CREATININE URINE: 50
POC MICROALBUMIN URINE: 150
POTASSIUM SERPL-SCNC: 4.8 MMOL/L (ref 3.5–5.2)
PROT SERPL-MCNC: 6.3 G/DL (ref 6–8.5)
PROT UR STRIP-MCNC: ABNORMAL MG/DL
RBC # BLD AUTO: 3.09 10*6/MM3 (ref 4.14–5.8)
RBC # UR STRIP: ABNORMAL /UL
SODIUM SERPL-SCNC: 143 MMOL/L (ref 136–145)
SP GR UR: 1.02 (ref 1–1.03)
TRIGL SERPL-MCNC: 83 MG/DL (ref 0–150)
TSH SERPL DL<=0.05 MIU/L-ACNC: 3.69 UIU/ML (ref 0.27–4.2)
UROBILINOGEN UR QL: NORMAL
VLDLC SERPL-MCNC: 17 MG/DL (ref 5–40)
WBC NRBC COR # BLD AUTO: 5.39 10*3/MM3 (ref 3.4–10.8)

## 2024-05-07 PROCEDURE — 80053 COMPREHEN METABOLIC PANEL: CPT | Performed by: INTERNAL MEDICINE

## 2024-05-07 PROCEDURE — 83036 HEMOGLOBIN GLYCOSYLATED A1C: CPT | Performed by: INTERNAL MEDICINE

## 2024-05-07 PROCEDURE — 80061 LIPID PANEL: CPT | Performed by: INTERNAL MEDICINE

## 2024-05-07 PROCEDURE — 85025 COMPLETE CBC W/AUTO DIFF WBC: CPT | Performed by: INTERNAL MEDICINE

## 2024-05-07 PROCEDURE — 81003 URINALYSIS AUTO W/O SCOPE: CPT | Performed by: INTERNAL MEDICINE

## 2024-05-07 PROCEDURE — 99397 PER PM REEVAL EST PAT 65+ YR: CPT | Performed by: INTERNAL MEDICINE

## 2024-05-07 PROCEDURE — 1170F FXNL STATUS ASSESSED: CPT | Performed by: INTERNAL MEDICINE

## 2024-05-07 PROCEDURE — 84443 ASSAY THYROID STIM HORMONE: CPT | Performed by: INTERNAL MEDICINE

## 2024-05-07 PROCEDURE — 1126F AMNT PAIN NOTED NONE PRSNT: CPT | Performed by: INTERNAL MEDICINE

## 2024-05-07 PROCEDURE — 82044 UR ALBUMIN SEMIQUANTITATIVE: CPT | Performed by: INTERNAL MEDICINE

## 2024-05-07 PROCEDURE — 81015 MICROSCOPIC EXAM OF URINE: CPT | Performed by: INTERNAL MEDICINE

## 2024-05-07 PROCEDURE — 87086 URINE CULTURE/COLONY COUNT: CPT | Performed by: INTERNAL MEDICINE

## 2024-05-07 PROCEDURE — 1159F MED LIST DOCD IN RCRD: CPT | Performed by: INTERNAL MEDICINE

## 2024-05-07 PROCEDURE — G0439 PPPS, SUBSEQ VISIT: HCPCS | Performed by: INTERNAL MEDICINE

## 2024-05-08 ENCOUNTER — OFFICE VISIT (OUTPATIENT)
Dept: UROLOGY | Facility: CLINIC | Age: 89
End: 2024-05-08
Payer: MEDICARE

## 2024-05-08 VITALS — HEIGHT: 73 IN | WEIGHT: 255 LBS | BODY MASS INDEX: 33.8 KG/M2

## 2024-05-08 DIAGNOSIS — R33.9 URINARY RETENTION: Primary | ICD-10-CM

## 2024-05-08 DIAGNOSIS — D64.9 ANEMIA, UNSPECIFIED TYPE: Primary | ICD-10-CM

## 2024-05-08 DIAGNOSIS — N39.0 RECURRENT UTI: ICD-10-CM

## 2024-05-08 LAB
BACTERIA SPEC AEROBE CULT: NORMAL
BACTERIA UR QL AUTO: ABNORMAL /HPF
COD CRY URNS QL: ABNORMAL /HPF
HYALINE CASTS UR QL AUTO: ABNORMAL /LPF
RBC # UR STRIP: ABNORMAL /HPF
REF LAB TEST METHOD: ABNORMAL
SQUAMOUS #/AREA URNS HPF: ABNORMAL /HPF
WBC # UR STRIP: ABNORMAL /HPF

## 2024-05-09 ENCOUNTER — TELEPHONE (OUTPATIENT)
Dept: INTERNAL MEDICINE | Facility: CLINIC | Age: 89
End: 2024-05-09
Payer: MEDICARE

## 2024-05-13 ENCOUNTER — HOSPITAL ENCOUNTER (OUTPATIENT)
Dept: SLEEP MEDICINE | Facility: HOSPITAL | Age: 89
Discharge: HOME | End: 2024-05-13
Admitting: NURSE PRACTITIONER
Payer: MEDICARE

## 2024-05-13 ENCOUNTER — LAB (OUTPATIENT)
Dept: LAB | Facility: HOSPITAL | Age: 89
End: 2024-05-13
Payer: MEDICARE

## 2024-05-13 VITALS — WEIGHT: 255 LBS | BODY MASS INDEX: 33.8 KG/M2 | HEIGHT: 73 IN

## 2024-05-13 DIAGNOSIS — D64.9 ANEMIA, UNSPECIFIED TYPE: ICD-10-CM

## 2024-05-13 DIAGNOSIS — G47.33 OSA (OBSTRUCTIVE SLEEP APNEA): ICD-10-CM

## 2024-05-13 LAB
BASOPHILS # BLD AUTO: 0.03 10*3/MM3 (ref 0–0.2)
BASOPHILS NFR BLD AUTO: 0.6 % (ref 0–1.5)
DEPRECATED RDW RBC AUTO: 45.6 FL (ref 37–54)
EOSINOPHIL # BLD AUTO: 0.17 10*3/MM3 (ref 0–0.4)
EOSINOPHIL NFR BLD AUTO: 3.4 % (ref 0.3–6.2)
ERYTHROCYTE [DISTWIDTH] IN BLOOD BY AUTOMATED COUNT: 13.6 % (ref 12.3–15.4)
FERRITIN SERPL-MCNC: 31.3 NG/ML (ref 30–400)
FOLATE SERPL-MCNC: >20 NG/ML (ref 4.78–24.2)
HCT VFR BLD AUTO: 29.5 % (ref 37.5–51)
HGB BLD-MCNC: 9.1 G/DL (ref 13–17.7)
IMM GRANULOCYTES # BLD AUTO: 0.01 10*3/MM3 (ref 0–0.05)
IMM GRANULOCYTES NFR BLD AUTO: 0.2 % (ref 0–0.5)
IRON 24H UR-MRATE: 20 MCG/DL (ref 59–158)
IRON SATN MFR SERPL: 5 % (ref 20–50)
LYMPHOCYTES # BLD AUTO: 0.86 10*3/MM3 (ref 0.7–3.1)
LYMPHOCYTES NFR BLD AUTO: 17.2 % (ref 19.6–45.3)
MCH RBC QN AUTO: 28.8 PG (ref 26.6–33)
MCHC RBC AUTO-ENTMCNC: 30.8 G/DL (ref 31.5–35.7)
MCV RBC AUTO: 93.4 FL (ref 79–97)
MONOCYTES # BLD AUTO: 0.49 10*3/MM3 (ref 0.1–0.9)
MONOCYTES NFR BLD AUTO: 9.8 % (ref 5–12)
NEUTROPHILS NFR BLD AUTO: 3.44 10*3/MM3 (ref 1.7–7)
NEUTROPHILS NFR BLD AUTO: 68.8 % (ref 42.7–76)
NRBC BLD AUTO-RTO: 0 /100 WBC (ref 0–0.2)
PLATELET # BLD AUTO: 215 10*3/MM3 (ref 140–450)
PMV BLD AUTO: 10.1 FL (ref 6–12)
RBC # BLD AUTO: 3.16 10*6/MM3 (ref 4.14–5.8)
RETICS # AUTO: 0.12 10*6/MM3 (ref 0.02–0.13)
RETICS/RBC NFR AUTO: 3.91 % (ref 0.7–1.9)
TIBC SERPL-MCNC: 401 MCG/DL (ref 298–536)
TRANSFERRIN SERPL-MCNC: 269 MG/DL (ref 200–360)
VIT B12 BLD-MCNC: 481 PG/ML (ref 211–946)
WBC NRBC COR # BLD AUTO: 5 10*3/MM3 (ref 3.4–10.8)

## 2024-05-13 PROCEDURE — 83540 ASSAY OF IRON: CPT

## 2024-05-13 PROCEDURE — 82728 ASSAY OF FERRITIN: CPT

## 2024-05-13 PROCEDURE — 95800 SLP STDY UNATTENDED: CPT

## 2024-05-13 PROCEDURE — 82746 ASSAY OF FOLIC ACID SERUM: CPT

## 2024-05-13 PROCEDURE — 85025 COMPLETE CBC W/AUTO DIFF WBC: CPT

## 2024-05-13 PROCEDURE — 84165 PROTEIN E-PHORESIS SERUM: CPT

## 2024-05-13 PROCEDURE — 85045 AUTOMATED RETICULOCYTE COUNT: CPT

## 2024-05-13 PROCEDURE — 84466 ASSAY OF TRANSFERRIN: CPT

## 2024-05-13 PROCEDURE — 86334 IMMUNOFIX E-PHORESIS SERUM: CPT

## 2024-05-13 PROCEDURE — 84155 ASSAY OF PROTEIN SERUM: CPT

## 2024-05-13 PROCEDURE — 82784 ASSAY IGA/IGD/IGG/IGM EACH: CPT

## 2024-05-13 PROCEDURE — 82607 VITAMIN B-12: CPT

## 2024-05-13 RX ORDER — OMEPRAZOLE 20 MG/1
20 CAPSULE, DELAYED RELEASE ORAL DAILY
Qty: 90 CAPSULE | Refills: 0 | Status: SHIPPED | OUTPATIENT
Start: 2024-05-13

## 2024-05-14 LAB
ALBUMIN SERPL ELPH-MCNC: 3.2 G/DL (ref 2.9–4.4)
ALBUMIN/GLOB SERPL: 1.1 {RATIO} (ref 0.7–1.7)
ALPHA1 GLOB SERPL ELPH-MCNC: 0.2 G/DL (ref 0–0.4)
ALPHA2 GLOB SERPL ELPH-MCNC: 0.7 G/DL (ref 0.4–1)
B-GLOBULIN SERPL ELPH-MCNC: 1.1 G/DL (ref 0.7–1.3)
GAMMA GLOB SERPL ELPH-MCNC: 1 G/DL (ref 0.4–1.8)
GLOBULIN SER-MCNC: 3 G/DL (ref 2.2–3.9)
IGA SERPL-MCNC: 558 MG/DL (ref 61–437)
IGG SERPL-MCNC: 1173 MG/DL (ref 603–1613)
IGM SERPL-MCNC: 35 MG/DL (ref 15–143)
INTERPRETATION SERPL IEP-IMP: ABNORMAL
LABORATORY COMMENT REPORT: ABNORMAL
M PROTEIN SERPL ELPH-MCNC: ABNORMAL G/DL
PROT SERPL-MCNC: 6.2 G/DL (ref 6–8.5)

## 2024-05-16 DIAGNOSIS — G47.33 OSA (OBSTRUCTIVE SLEEP APNEA): Primary | ICD-10-CM

## 2024-05-22 DIAGNOSIS — F32.9 REACTIVE DEPRESSION: ICD-10-CM

## 2024-05-24 DIAGNOSIS — R41.3 MEMORY LOSS: ICD-10-CM

## 2024-05-24 RX ORDER — MEMANTINE HYDROCHLORIDE 10 MG/1
10 TABLET ORAL 2 TIMES DAILY
Qty: 180 TABLET | Refills: 0 | Status: SHIPPED | OUTPATIENT
Start: 2024-05-24

## 2024-05-31 ENCOUNTER — TELEPHONE (OUTPATIENT)
Dept: INTERNAL MEDICINE | Facility: CLINIC | Age: 89
End: 2024-05-31
Payer: MEDICARE

## 2024-05-31 ENCOUNTER — TELEPHONE (OUTPATIENT)
Dept: SLEEP MEDICINE | Age: 89
End: 2024-05-31
Payer: MEDICARE

## 2024-05-31 ENCOUNTER — TELEMEDICINE (OUTPATIENT)
Dept: CARDIOLOGY | Facility: HOSPITAL | Age: 89
End: 2024-05-31
Payer: MEDICARE

## 2024-05-31 DIAGNOSIS — Z95.818 PRESENCE OF WATCHMAN LEFT ATRIAL APPENDAGE CLOSURE DEVICE: ICD-10-CM

## 2024-05-31 DIAGNOSIS — I48.21 PERMANENT ATRIAL FIBRILLATION: Primary | ICD-10-CM

## 2024-05-31 DIAGNOSIS — R26.81 GAIT INSTABILITY: Primary | ICD-10-CM

## 2024-05-31 DIAGNOSIS — R29.898 MUSCULAR DECONDITIONING: ICD-10-CM

## 2024-05-31 DIAGNOSIS — R06.02 SHORTNESS OF BREATH: ICD-10-CM

## 2024-05-31 NOTE — TELEPHONE ENCOUNTER
Patient's daughter returned called pt is ready to start CPAP Therapy any DME is fine just in [place]. The daughter is leaving for [place] sometime on 6-4-24. Please call her at [phone] if needed

## 2024-05-31 NOTE — PROGRESS NOTES
Mode of visit: Video  Location of patient: Home   Do you consent to the use of telemedicine in your medical care today? Yes      Chief Complaint  Watchman follow-up    [facility]  [facility]  Telemedicine note    This was a video enabled telemedicine encounter.    Subjective    History of Present Illness {CC  Problem List  Visit  Diagnosis   Encounters  Notes  Medications  Labs  Result Review Imaging  Media :23}     Darien Camarena, 88 y.o. male with permanent AF s/p Watchman, HTN, GIB presents to [facility] telemedicine visit for Watchman follow-up    6 Month Watchman Follow-up Note    Patient presents today for follow-up s/p watchman placement by Dr. Mcdaniel on 11/28/23.  Patient has completed follow-up FARHAD that revealed a well-positioned watchman device with no significant stevan-prosthetic leak present.  Patient has been maintaining DAPT, instructed to transition to aspirin monotherapy now that he is s/p 6 month from LAAO placement..      Discussed 1 year watchman follow-up with planned FARHAD around 1 year anniversary date of watchman implant.  1 year FARHAD previously ordered per EP team.  Patient receptive of plan, appreciative of call.       Objective     Vital Signs:   There were no vitals filed for this visit.  There is no height or weight on file to calculate BMI.  Physical Exam  Constitutional:       Appearance: Normal appearance.   HENT:      Head: Normocephalic.   Pulmonary:      Effort: Pulmonary effort is normal. No respiratory distress.   Neurological:      Mental Status: He is alert.   Psychiatric:         Mood and Affect: Mood normal.         Behavior: Behavior normal.         Thought Content: Thought content normal.        Data Reviewed:{ Labs  Result Review  Imaging  Med Tab  Media :23}     EP/CRM Study (11/28/2023 10:43)  Adult Transesophageal Echo 3D (FARHAD) W/ Cont If Necessary Per Protocol (01/12/2024 15:10)  Progress Notes by Anthony Mcdaniel MD  (03/07/2024 13:30)     Assessment and Plan {CC Problem List  Visit Diagnosis  ROS  Review (Popup)  Health Maintenance  Quality  BestPractice  Medications  SmartSets  SnapShot Encounters  Media :23}     This visit has been scheduled as a video visit.     1. Permanent atrial fibrillation/ presence of Watchman left atrial appendage closure device  -s/p watchman placement by Dr. Mcdaniel on 11/28/23.    -follow-up FARHAD revealed a well-positioned watchman device with no significant stevan-prosthetic leak present.    -Instructed to transition to aspirin monotherapy now that he is s/p 6 month from LAAO placement.  -Continue ASA 81mg daily     -Discussed 1 year watchman follow-up with planned FARHAD around 1 year anniversary date of watchman implant.    -1 year FARHAD ordered previously by EP team      Follow Up {Instructions Charge Capture  Follow-up Communications :23}   Return if symptoms worsen or fail to improve.        Patient was given instructions and counseling regarding his condition or for health maintenance advice. Please see specific information pulled into the AVS if appropriate.  Patient was instructed to call the [facility] Center with any questions, concerns, or worsening symptoms.    *Please note that portions of this note were completed with a voice recognition program. Efforts were made to edit the dictations, but occasionally words are mistranscribed.

## 2024-05-31 NOTE — TELEPHONE ENCOUNTER
Caller: SHAWN KWON    Relationship: Emergency Contact    Best call back number: [phone]     What medication are you requesting: MANUAL WHEELCHAIR     What are your current symptoms: DIFFICULTY WALKING AND SHORTNESS OF BREATH LONG DISTANCES     How long have you been experiencing symptoms:     Have you had these symptoms before:    [] Yes  [] No    Have you been treated for these symptoms before:   [] Yes  [] No    If a prescription is needed, what is your preferred pharmacy and phone number: PCP MAY DECIDE WHERE TO GET THE WHEELCHAIR.     Additional notes: PATIENT WILL BE LEAVING FOR A TRIP 6/6/24 AND WOULD LIKE THE WHEELCHAIR BEFORE LEAVING IF POSSIBLE.

## 2024-06-03 ENCOUNTER — HOME HEALTH ADMISSION (OUTPATIENT)
Dept: HOME HEALTH SERVICES | Facility: HOME HEALTHCARE | Age: 89
End: 2024-06-03
Payer: COMMERCIAL

## 2024-06-03 ENCOUNTER — HOSPITAL ENCOUNTER (OUTPATIENT)
Dept: GENERAL RADIOLOGY | Facility: HOSPITAL | Age: 89
Discharge: HOME | End: 2024-06-03
Admitting: STUDENT IN AN ORGANIZED HEALTH CARE EDUCATION/TRAINING PROGRAM
Payer: MEDICARE

## 2024-06-03 ENCOUNTER — TELEPHONE (OUTPATIENT)
Dept: INTERNAL MEDICINE | Facility: CLINIC | Age: 89
End: 2024-06-03
Payer: MEDICARE

## 2024-06-03 ENCOUNTER — OFFICE VISIT (OUTPATIENT)
Dept: INTERNAL MEDICINE | Facility: CLINIC | Age: 89
End: 2024-06-03
Payer: MEDICARE

## 2024-06-03 VITALS
RESPIRATION RATE: 20 BRPM | SYSTOLIC BLOOD PRESSURE: 118 MMHG | TEMPERATURE: 97.1 F | WEIGHT: 260.13 LBS | DIASTOLIC BLOOD PRESSURE: 80 MMHG | BODY MASS INDEX: 34.32 KG/M2 | HEART RATE: 104 BPM | OXYGEN SATURATION: 94 %

## 2024-06-03 DIAGNOSIS — L03.115 CELLULITIS OF RIGHT LOWER EXTREMITY: ICD-10-CM

## 2024-06-03 DIAGNOSIS — I50.22 CHRONIC SYSTOLIC HEART FAILURE: ICD-10-CM

## 2024-06-03 DIAGNOSIS — S81.801A WOUND OF RIGHT LOWER EXTREMITY, INITIAL ENCOUNTER: Primary | ICD-10-CM

## 2024-06-03 DIAGNOSIS — R06.02 SHORTNESS OF BREATH: ICD-10-CM

## 2024-06-03 LAB
ANION GAP SERPL CALCULATED.3IONS-SCNC: 7 MMOL/L (ref 5–15)
BUN SERPL-MCNC: 28 MG/DL (ref 8–23)
BUN/CREAT SERPL: 26.2 (ref 7–25)
CALCIUM SPEC-SCNC: 9.1 MG/DL (ref 8.6–10.5)
CHLORIDE SERPL-SCNC: 107 MMOL/L (ref 98–107)
CO2 SERPL-SCNC: 30 MMOL/L (ref 22–29)
CREAT SERPL-MCNC: 1.07 MG/DL (ref 0.76–1.27)
EGFRCR SERPLBLD CKD-EPI 2021: 67.2 ML/MIN/1.73
GLUCOSE SERPL-MCNC: 101 MG/DL (ref 65–99)
NT-PROBNP SERPL-MCNC: 3393 PG/ML (ref 0–1800)
POTASSIUM SERPL-SCNC: 5.1 MMOL/L (ref 3.5–5.2)
SODIUM SERPL-SCNC: 144 MMOL/L (ref 136–145)

## 2024-06-03 PROCEDURE — 83880 ASSAY OF NATRIURETIC PEPTIDE: CPT | Performed by: STUDENT IN AN ORGANIZED HEALTH CARE EDUCATION/TRAINING PROGRAM

## 2024-06-03 PROCEDURE — 99214 OFFICE O/P EST MOD 30 MIN: CPT | Performed by: STUDENT IN AN ORGANIZED HEALTH CARE EDUCATION/TRAINING PROGRAM

## 2024-06-03 PROCEDURE — 71046 X-RAY EXAM CHEST 2 VIEWS: CPT

## 2024-06-03 PROCEDURE — 1159F MED LIST DOCD IN RCRD: CPT | Performed by: STUDENT IN AN ORGANIZED HEALTH CARE EDUCATION/TRAINING PROGRAM

## 2024-06-03 PROCEDURE — 1160F RVW MEDS BY RX/DR IN RCRD: CPT | Performed by: STUDENT IN AN ORGANIZED HEALTH CARE EDUCATION/TRAINING PROGRAM

## 2024-06-03 PROCEDURE — 80048 BASIC METABOLIC PNL TOTAL CA: CPT | Performed by: STUDENT IN AN ORGANIZED HEALTH CARE EDUCATION/TRAINING PROGRAM

## 2024-06-03 PROCEDURE — 1126F AMNT PAIN NOTED NONE PRSNT: CPT | Performed by: STUDENT IN AN ORGANIZED HEALTH CARE EDUCATION/TRAINING PROGRAM

## 2024-06-03 PROCEDURE — 85025 COMPLETE CBC W/AUTO DIFF WBC: CPT | Performed by: STUDENT IN AN ORGANIZED HEALTH CARE EDUCATION/TRAINING PROGRAM

## 2024-06-03 RX ORDER — CEPHALEXIN 500 MG/1
500 CAPSULE ORAL 4 TIMES DAILY
Qty: 20 CAPSULE | Refills: 0 | Status: SHIPPED | OUTPATIENT
Start: 2024-06-03 | End: 2024-06-08

## 2024-06-03 NOTE — Clinical Note
Caitie,  Just saw Mr. Camarena in clinic today. He's having impressive LE edema, JVD, crackles and dyspnea. Weight up about 5lbs from prior. I'm increasing his lasix to Bid for next 5 days and monitoring. Just saw they don't have a follow up until 10/2024, so wanted to make you aware in case you wanted to make any adjustments or get him in sooner.  Thanks, Steve Campbell

## 2024-06-03 NOTE — TELEPHONE ENCOUNTER
"Spoke with patient daughter, Columba, informed that Dr Campbell said   \"It looks like on March 6, 2024 patient saw Blanquita CORDOBA of cardiology at which time she restarted lasix 20mg daily, so should be taking this.  There should be an active Rx for this at the pharmacy. If they need us to resend we can.\"  Verbalized understanding, states she did not realize they were suppose to have restarted it but that they do have the rx and will start it and take as Dr Campbell recommended at appointment today.   "

## 2024-06-03 NOTE — TELEPHONE ENCOUNTER
Caller: NEIL QUINTERO    Relationship: Emergency Contact    Best call back number: [phone]    What is the best time to reach you: ANYTIME    Who are you requesting to speak with (clinical staff, provider,  specific staff member): ESTELA    Do you know the name of the person who called: NICOLE TREJO    What was the call regarding: LASIX.  PATIENT WAS SEEN TODAY BY DR DANG.  HE WAS TOLD TO RESUME LASIX BUT HE HAS NOT BEEN ON LASIX SINCE HIS HOSPITAL STAY    Is it okay if the provider responds through MyChart:

## 2024-06-03 NOTE — TELEPHONE ENCOUNTER
It looks like on March 6, 2024 patient saw Blanquita CORDOBA of cardiology at which time she restarted lasix 20mg daily, so should be taking this.  There should be an active Rx for this at the pharmacy. If they need us to resend we can.    DR. Campbell

## 2024-06-03 NOTE — PATIENT INSTRUCTIONS
Increase your Lasix (furosemide) to 20mg twice daily for the next 5 days.  (Take at 5AM and 11AM to 12PM daily)

## 2024-06-03 NOTE — PROGRESS NOTES
"    Follow Up Office Visit      Date: 2024   Patient Name: Darien Camarena  : 1935   MRN: [id number]     Chief Complaint:    Chief Complaint   Patient presents with    Wound Check       History of Present Illness: Darien Camarena is a 88 y.o. male with history of Afib s/p watchman device, chronic systolic heart failure (last EF 46-50% on 24), T2DM with CKD stage 3, Chronic back pain, STEVEN who is here today for leg swelling, rash, cough.    HPI  History of Present Illness  The patient presents for evaluation of multiple medical concerns. He is accompanied by his daughter.    The patient reports experiencing \"drainage\" from his legs, accompanied by leg swelling, which is more severe than his usual state. He has been managing the drainage with a bandage and denies any associated pain. His daughter  notes that he has been experiencing significant confusion (has baseline cognitive impairment), excessive sleepiness, and occasional shortness of breath, particularly during prolonged walking. Reports he utilizes a CPAP machine, and he finds that lying flat does not cause him any discomfort or shortness of breath. He denies any fever or chills. He is currently on a regimen of 20 mg of Lasix daily (upon further discussion with daughter after visit it appears he has not taken this since his hospital discharge 2024, though I personally reviewed note by Blanuqita CORDOBA of cardiology dated 3/6/24 at which time this was resumed) . His daughter notes that he needs to monitor his oxygen levels, with reported levels around 94% at home. A home health nurse was scheduled to visit him today, but due to time constraints, she is uncertain of their return home. His daughter has contacted Dr. Way's office on Friday to expedite the acquisition of a wheelchair. He denies any aches, pain, headaches, chest pain, or stomach pain. He is uncertain about his urine output and admits to inadequate fluid " intake.    The daughter reports that he has been experiencing a cough and expectorating clear phlegm.    Supplemental Information  He is seeing Dr. Oliveros for cardiology for A. fib. He has had the watchman device placed.   He is allergic to PENICILLIN.        Subjective      Review of Systems:   Review of Systems    I have reviewed the patients family history, social history, past medical history, past surgical history and have updated it as appropriate.     Medications:     Current Outpatient Medications:     albuterol sulfate  (90 Base) MCG/ACT inhaler, Inhale 2 puffs Every 4 (Four) Hours As Needed for Wheezing., Disp: 18 g, Rfl: 5    allopurinol (ZYLOPRIM) 300 MG tablet, Take 1 tablet by mouth Daily., Disp: 90 tablet, Rfl: 0    ascorbic acid (VITAMIN C) 1000 MG tablet, Take 1 tablet by mouth 2 (Two) Times a Day., Disp: , Rfl:     aspirin 81 MG EC tablet, Take 1 tablet by mouth Daily. 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., Disp: 90 tablet, Rfl: 1    Coenzyme Q10 300 MG capsule, Take 1 capsule by mouth Every Night., Disp: , Rfl:     docusate sodium (COLACE) 100 MG capsule, Take 2 capsules by mouth At Night As Needed for Constipation., Disp: , Rfl:     donepezil (ARICEPT) 10 MG tablet, TAKE 1 TABLET BY MOUTH ONCE DAILY AT NIGHT, Disp: 90 tablet, Rfl: 0    Ferrous Gluconate (IRON) 240 (27 FE) MG tablet, Take 1 tablet by mouth Daily., Disp: , Rfl:     finasteride (PROSCAR) 5 MG tablet, TAKE 1 TABLET BY MOUTH ONCE DAILY AT BEDTIME, Disp: 90 tablet, Rfl: 0    furosemide (LASIX) 20 MG tablet, Take 1 tablet by mouth Daily., Disp: 90 tablet, Rfl: 1    memantine (NAMENDA) 10 MG tablet, Take 1 tablet by mouth twice daily, Disp: 180 tablet, Rfl: 0    metFORMIN (GLUCOPHAGE) 1000 MG tablet, Take 1 tablet by mouth twice daily, Disp: 180 tablet, Rfl: 1    methenamine (HIPREX) 1 g tablet, Take 1 tablet by mouth 2 (Two) Times a Day With Meals., Disp: , Rfl:     metoprolol tartrate (LOPRESSOR) 100 MG tablet, Take 1  tablet by mouth 2 (Two) Times a Day., Disp: 180 tablet, Rfl: 3    multivitamin with minerals (MULTIVITAMIN MEN 50+ PO), Take 1 tablet by mouth Every Night. Centrum Sliver, Disp: , Rfl:     omeprazole (priLOSEC) 20 MG capsule, Take 1 capsule by mouth once daily, Disp: 90 capsule, Rfl: 0    pravastatin (PRAVACHOL) 40 MG tablet, Take 1 tablet by mouth once daily, Disp: 90 tablet, Rfl: 0    Probiotic Product (PROBIOTIC ADVANCED PO), Take 1 tablet by mouth 2 (Two) Times a Day., Disp: , Rfl:     sertraline (ZOLOFT) 50 MG tablet, Take 1 tablet by mouth once daily, Disp: 90 tablet, Rfl: 0    tiotropium bromide-olodaterol (STIOLTO RESPIMAT) 2.5-2.5 MCG/ACT aerosol solution inhaler, Inhale 2 puffs Daily. 2 inh once a day, Disp: 1 each, Rfl: 3    valsartan (DIOVAN) 40 MG tablet, Take 1 tablet by mouth Daily., Disp: 90 tablet, Rfl: 1    Allergies:   Allergies   Allergen Reactions    Xarelto [Rivaroxaban] GI Bleeding     GI bleed    Penicillins Hives     Has tolerated cefepime, ceftriaxone, cefazolin, cefdinir, cefuroxime, cephalexin       Objective     Physical Exam: Please see above  Vital Signs:   Vitals:    06/03/24 1200   BP: 118/80   BP Location: Left arm   Patient Position: Sitting   Cuff Size: Adult   Pulse: 104   Resp: 20   Temp: 97.1 °F (36.2 °C)   TempSrc: Temporal   SpO2: 94%   Weight: 118 kg (260 lb 2 oz)   PainSc: 0-No pain     Body mass index is 34.32 kg/m².    Physical Exam  Vitals reviewed.   Constitutional:       General: He is not in acute distress.     Appearance: Normal appearance. He is not ill-appearing or toxic-appearing.   HENT:      Head: Normocephalic and atraumatic.      Right Ear: External ear normal.      Left Ear: External ear normal.      Nose: Nose normal. No congestion.      Mouth/Throat:      Mouth: Mucous membranes are moist.   Eyes:      General:         Right eye: No discharge.         Left eye: No discharge.      Extraocular Movements: Extraocular movements intact.   Neck:      Vascular: No  carotid bruit.      Comments: + JVD with hepatojugular distension  Cardiovascular:      Rate and Rhythm: Normal rate. Rhythm irregular.      Pulses: Normal pulses.      Heart sounds: Normal heart sounds. No murmur heard.     No friction rub.   Pulmonary:      Effort: Pulmonary effort is normal. No respiratory distress.      Breath sounds: Rales (at bases b/l) present. No wheezing or rhonchi.   Abdominal:      General: Abdomen is flat. Bowel sounds are normal. There is no distension.      Palpations: Abdomen is soft. There is no mass.      Tenderness: There is no abdominal tenderness. There is no guarding or rebound.   Musculoskeletal:         General: Swelling (prominent 2+ edema to knees b/l) present. No deformity. Normal range of motion.      Cervical back: Normal range of motion. No rigidity.   Lymphadenopathy:      Cervical: No cervical adenopathy.   Skin:     General: Skin is warm and dry.      Capillary Refill: Capillary refill takes less than 2 seconds.      Coloration: Skin is not jaundiced or pale.      Findings: Erythema and rash present.      Comments: Erythema to RLE surrounding ruptured bullae with clear discharge   Neurological:      General: No focal deficit present.      Mental Status: He is alert and oriented to person, place, and time. Mental status is at baseline.   Psychiatric:         Mood and Affect: Mood normal.         Behavior: Behavior normal.         Judgment: Judgment normal.       Physical Exam  Crackles and fluid are heard at the bottom of both sides of the lungs.      Procedures    Results:   Labs:   Hemoglobin A1C   Date Value Ref Range Status   05/07/2024 5.90 (H) 4.80 - 5.60 % Final     TSH   Date Value Ref Range Status   05/07/2024 3.690 0.270 - 4.200 uIU/mL Final      Results        Imaging:   No valid procedures specified.     Assessment / Plan      Assessment/Plan:   Problem List Items Addressed This Visit       Chronic systolic heart failure    Overview     FARHAD (1/12/2024):  LVEF 40%.  27 mm Watchman device well-seated.  No thrombus.  Echo (1/27/2024): LVEF 47.8%.          Relevant Orders    Ambulatory Referral to Home Health    Basic metabolic panel    BNP     Other Visit Diagnoses       Wound of right lower extremity, initial encounter    -  Primary    Relevant Orders    Ambulatory Referral to Home Health    Shortness of breath        Relevant Orders    CBC w AUTO Differential    Basic metabolic panel    XR Chest PA & Lateral    Cellulitis of right lower extremity        Relevant Medications    cephalexin (Keflex) 500 MG capsule    Other Relevant Orders    Ambulatory Referral to Home Health            Assessment & Plan  1. Lower extremity edema.  2. Dyspnea  3. Right LE wound, cellulitis  - all likely secondary to acute on chronic CHF exacerbation. Patient noted to have crackles, LE edema and JVD.   The patient's vital signs, including temperature, BP and O2 sat are appropriate. The cough, swelling, and oozing are likely attributable to fluid accumulation. A weight gain of approximately 5 pounds has been noted. Laboratory tests will be conducted to assess kidney function and blood counts. A BNP test will be conducted to assess for fluid overload. A referral to our wound care nurse will be made. An order for a wheelchair will be provided. The patient is advised to monitor his fluid intake, aiming for max 2 liters per day. The Lasix dosage will be increased to 20mg twice daily for the next  5 days. The patient is advised to elevate his legs. Keflex 500 mg will be prescribed to address the skin irritation and infection. If his edema does not improve, his has any worsening dyspnea, or any new fevers or worsening rash/cellulitis he is to RTC within 1 week.       Follow Up:   Return if symptoms worsen or fail to improve.      Benja Campbell MD  [facility] KAILEY Pruitt    Patient or patient representative verbalized consent for the use of Ambient Listening during the visit with  Benja  MD Adrian for chart documentation. 6/3/2024  12:43 EDT

## 2024-06-04 ENCOUNTER — HOME CARE VISIT (OUTPATIENT)
Dept: HOME HEALTH SERVICES | Facility: HOME HEALTHCARE | Age: 89
End: 2024-06-04
Payer: MEDICARE

## 2024-06-04 ENCOUNTER — TELEPHONE (OUTPATIENT)
Dept: CARDIOLOGY | Facility: CLINIC | Age: 89
End: 2024-06-04
Payer: MEDICARE

## 2024-06-04 ENCOUNTER — TELEPHONE (OUTPATIENT)
Dept: INTERNAL MEDICINE | Facility: CLINIC | Age: 89
End: 2024-06-04
Payer: MEDICARE

## 2024-06-04 DIAGNOSIS — S81.801A WOUND OF RIGHT LOWER EXTREMITY, INITIAL ENCOUNTER: Primary | ICD-10-CM

## 2024-06-04 DIAGNOSIS — I50.23 ACUTE ON CHRONIC SYSTOLIC HEART FAILURE: Primary | ICD-10-CM

## 2024-06-04 PROBLEM — I50.21 ACUTE HFREF (HEART FAILURE WITH REDUCED EJECTION FRACTION): Status: ACTIVE | Noted: 2024-06-04

## 2024-06-04 PROBLEM — N18.9 ACUTE KIDNEY INJURY SUPERIMPOSED ON CHRONIC KIDNEY DISEASE: Status: RESOLVED | Noted: 2024-01-03 | Resolved: 2024-06-04

## 2024-06-04 PROBLEM — N17.9 ACUTE KIDNEY INJURY SUPERIMPOSED ON CHRONIC KIDNEY DISEASE: Status: RESOLVED | Noted: 2024-01-03 | Resolved: 2024-06-04

## 2024-06-04 PROBLEM — I50.21 ACUTE HFREF (HEART FAILURE WITH REDUCED EJECTION FRACTION): Status: RESOLVED | Noted: 2024-06-04 | Resolved: 2024-06-04

## 2024-06-04 PROBLEM — I50.22 HEART FAILURE WITH MILDLY REDUCED EJECTION FRACTION (HFMREF): Status: RESOLVED | Noted: 2024-01-26 | Resolved: 2024-06-04

## 2024-06-04 LAB
BASOPHILS # BLD AUTO: 0.06 10*3/MM3 (ref 0–0.2)
BASOPHILS NFR BLD AUTO: 0.9 % (ref 0–1.5)
DEPRECATED RDW RBC AUTO: 48.9 FL (ref 37–54)
EOSINOPHIL # BLD AUTO: 0.17 10*3/MM3 (ref 0–0.4)
EOSINOPHIL NFR BLD AUTO: 2.5 % (ref 0.3–6.2)
ERYTHROCYTE [DISTWIDTH] IN BLOOD BY AUTOMATED COUNT: 14.5 % (ref 12.3–15.4)
HCT VFR BLD AUTO: 36.1 % (ref 37.5–51)
HGB BLD-MCNC: 10.4 G/DL (ref 13–17.7)
IMM GRANULOCYTES # BLD AUTO: 0.03 10*3/MM3 (ref 0–0.05)
IMM GRANULOCYTES NFR BLD AUTO: 0.4 % (ref 0–0.5)
LYMPHOCYTES # BLD AUTO: 1.29 10*3/MM3 (ref 0.7–3.1)
LYMPHOCYTES NFR BLD AUTO: 18.7 % (ref 19.6–45.3)
MCH RBC QN AUTO: 27.1 PG (ref 26.6–33)
MCHC RBC AUTO-ENTMCNC: 28.8 G/DL (ref 31.5–35.7)
MCV RBC AUTO: 94 FL (ref 79–97)
MONOCYTES # BLD AUTO: 0.69 10*3/MM3 (ref 0.1–0.9)
MONOCYTES NFR BLD AUTO: 10 % (ref 5–12)
NEUTROPHILS NFR BLD AUTO: 4.66 10*3/MM3 (ref 1.7–7)
NEUTROPHILS NFR BLD AUTO: 67.5 % (ref 42.7–76)
NRBC BLD AUTO-RTO: 0 /100 WBC (ref 0–0.2)
PLATELET # BLD AUTO: 293 10*3/MM3 (ref 140–450)
PMV BLD AUTO: 10.7 FL (ref 6–12)
RBC # BLD AUTO: 3.84 10*6/MM3 (ref 4.14–5.8)
WBC NRBC COR # BLD AUTO: 6.9 10*3/MM3 (ref 3.4–10.8)

## 2024-06-04 PROCEDURE — G0299 HHS/HOSPICE OF RN EA 15 MIN: HCPCS

## 2024-06-04 NOTE — TELEPHONE ENCOUNTER
----- Message from Titus Oliveros sent at 6/3/2024  5:21 PM EDT -----  Thanks.  We will get him in sooner.    RYANNE Oliveros MD [facility]  Interventional and General Cardiology  ----- Message -----  From: Benja Campbell MD  Sent: 6/3/2024  12:39 PM EDT  To: Titus Oliveros IV, MD    [facility],    Just saw Mr. Camarena in clinic today. He's having impressive LE edema, JVD, crackles and dyspnea. Weight up about 5lbs from prior. I'm increasing his lasix to Bid for next 5 days and monitoring. Just saw they don't have a follow up until 10/2024, so wanted to make you aware in case you wanted to make any adjustments or get him in sooner.    Thanks,  Steve Campbell

## 2024-06-04 NOTE — TELEPHONE ENCOUNTER
Kya called from [facility], She went to patients [place] today for admission to home health and they are not able to admit patient because he is not homebound. Patient is going to [place] next week on a trip and she stated that they can not admit him today. Kya stated that he would benefit from outpatient wound care. Kya did clean his dressing today

## 2024-06-04 NOTE — HOME HEALTH
SOC Note:    Home Health ordered for: disciplines ***    Reason for Hosp/Primary Dx/Co-morbidities: ***    Focus of Care: ***    Patient's goal(s):***    Current Functional status/mobility/DME: ***    HB status/Living Arrangements: ***    Skin Integrity/wound status: ***    Code Status: ***    Fall Risk/Safety concerns: ***    Educated on Emergency Plan, steps to take prior to going to the ER and when to Call [facility]:  ***      Medication issues/Concerns:***    Additional Problems/Concerns: ***    SDOH Barriers (i.e. caregiver concerns, social isolation, transportation, food insecurity, environment, income etc.)/Need for MSW: ***    Plan for next visit: ***

## 2024-06-04 NOTE — Clinical Note
"Pt is a non admit duen to \"not homebound\". Pt reports he drives and \"does and goes as he wants\". Pt has a trip to [place] planned for next week with his daughter. Called and spoke to Lizet. Pt would benefit from outpt wound care and compression therapy. Daughter, Nikki, present at visit and agreeable to non admit due to not homebound. "

## 2024-06-04 NOTE — TELEPHONE ENCOUNTER
Did you want any testing prior to seeing him? He is not due for his FARHAD per watchman protocol until 11/2024. He had a CXR yesterday but no results yet.     BNP 3393, 6/3/2024

## 2024-06-04 NOTE — TELEPHONE ENCOUNTER
I would have him continue taking furosemide twice daily for the next week and he will need office visit with Ami Alicea or heart failure clinic next week.    RYANNE Oliveros MD FAC, [facility]  Interventional and General Cardiology

## 2024-06-05 ENCOUNTER — TELEPHONE (OUTPATIENT)
Dept: PEDIATRICS | Facility: OTHER | Age: 89
End: 2024-06-05
Payer: MEDICARE

## 2024-06-05 DIAGNOSIS — M54.50 CHRONIC BILATERAL LOW BACK PAIN WITHOUT SCIATICA: Primary | ICD-10-CM

## 2024-06-05 DIAGNOSIS — G89.29 CHRONIC BILATERAL LOW BACK PAIN WITHOUT SCIATICA: Primary | ICD-10-CM

## 2024-06-05 DIAGNOSIS — R26.81 GAIT INSTABILITY: ICD-10-CM

## 2024-06-05 DIAGNOSIS — R29.898 MUSCULAR DECONDITIONING: ICD-10-CM

## 2024-06-05 RX ORDER — FUROSEMIDE 20 MG/1
20 TABLET ORAL DAILY
Qty: 90 TABLET | Refills: 1 | Status: SHIPPED | OUTPATIENT
Start: 2024-06-05

## 2024-06-05 NOTE — TELEPHONE ENCOUNTER
PATIENT DAUGHTER CALLED IN TO MAKE SURE DR. KARIMI OFFICE SPOKE WITH DR. CHAVARRIA. NEIL WOULD LIKE FOR THE  TO GIVER HER A CALL TO MAKE THE APPOINTMENT DUE TO HER DAD HAVING A TENDENCY TO NOT WRITE HIS APPOINTMENTS DOWN.

## 2024-06-05 NOTE — TELEPHONE ENCOUNTER
Caller: NEIL QUINTERO    Relationship: Emergency Contact    Best call back number:  [phone] (Mobile)     Requested Prescriptions:   Requested Prescriptions     Pending Prescriptions Disp Refills    furosemide (LASIX) 20 MG tablet 90 tablet 1     Sig: Take 1 tablet by mouth Daily.        Pharmacy where request should be sent: [facility] [address] [place] [address] [phone] [facility] [phone] FX     Last office visit with prescribing clinician: 5/7/2024   Last telemedicine visit with prescribing clinician: Visit date not found   Next office visit with prescribing clinician: 9/10/2024     Additional details provided by patient:  NEIL STATES THERE WAS SOME CONFUSION ABOUT THE FUROSEMIDE   AND NO ONE PUT HIM BACK ON THE POTASSIUM    SHE IS REQUESTING A REFILL     Would you like a call back once the refill request has been completed: [] Yes [x] No    If the office needs to give you a call back, can they leave a voicemail: [] Yes [x] No

## 2024-06-05 NOTE — TELEPHONE ENCOUNTER
Caller: LEONNEIL  DAUGHTER     Relationship: Emergency Contact    Best call back number:  [phone] (Mobile)     What is the medical concern/diagnosis:  EDEMA FROM LEG     What specialty or service is being requested: [facility]   TO EVALUATE AND TREAT     What is the office location: [facility]     Any additional details: PATIENT WAS DISMISSED FROM HOME HEALTH CARE     ALSO REQUESTING OUTPATIENT WOUND CARE AT [facility] FOR THE PATENT       PLEASE CALL NEIL WHEN THE APPOINTMENTS ARE MADE

## 2024-06-09 ENCOUNTER — HOSPITAL ENCOUNTER (OUTPATIENT)
Dept: PHYSICAL THERAPY | Facility: HOSPITAL | Age: 89
Setting detail: THERAPIES SERIES
Discharge: HOME | End: 2024-06-09
Payer: MEDICARE

## 2024-06-09 DIAGNOSIS — S81.802D OPEN WOUND OF LEFT LOWER LEG, SUBSEQUENT ENCOUNTER: ICD-10-CM

## 2024-06-09 DIAGNOSIS — S81.801A MULTIPLE OPEN WOUNDS OF RIGHT LOWER LEG: ICD-10-CM

## 2024-06-09 DIAGNOSIS — R60.0 BILATERAL LOWER EXTREMITY EDEMA: Primary | ICD-10-CM

## 2024-06-09 PROCEDURE — 97162 PT EVAL MOD COMPLEX 30 MIN: CPT

## 2024-06-09 PROCEDURE — 29581 APPL MULTLAYER CMPRN SYS LEG: CPT

## 2024-06-09 PROCEDURE — 97597 DBRDMT OPN WND 1ST 20 CM/<: CPT

## 2024-06-09 NOTE — THERAPY EVALUATION
Outpatient Rehabilitation - Wound/Debridement Initial Eval  [facility]     Patient Name: Darien Camarena  : 1935  MRN: [id number]  Today's Date: 2024              JOSUELG SMART        Admit Date: 2024    Visit Dx:    ICD-10-CM ICD-9-CM   1. Bilateral lower extremity edema  R60.0 782.3   2. Open wound of left lower leg, subsequent encounter  S81.802D V58.89     891.0   3. Multiple open wounds of right lower leg  S81.801A 894.0       Patient Active Problem List   Diagnosis    Atopic rhinitis    Benign (familial) paraproteinemia    Type 2 diabetes mellitus with stage 3 chronic kidney disease, without long-term current use of insulin    Enlarged prostate without lower urinary tract symptoms (luts)    Gastroesophageal reflux disease with esophagitis    Gout    Hyperlipidemia LDL goal <100    Essential hypertension    Nephrolithiasis    Obstructive sleep apnea syndrome    Permanent atrial fibrillation    Stage 3 chronic kidney disease    Long term current use of antiarrhythmic medical therapy    Hydronephrosis    Coronary artery disease involving native coronary artery of native heart without angina pectoris    Malignant neoplasm of lower lobe of left lung    Chronic bilateral low back pain without sciatica    Other microscopic hematuria    H/O: GI bleed    Presence of Watchman left atrial appendage closure device    Recurrent UTI    Acute on chronic systolic heart failure    Obesity    Somnolence, daytime        Past Medical History:   Diagnosis Date    Arrhythmia     Atrial fibrillation     Bilateral leg cramps     possible new medication related side effects    Chronic kidney disease     Clotting disorder     pt doesnt know about this    COPD (chronic obstructive pulmonary disease)     Coronary artery disease     Diabetes mellitus     doesnt check sugar    E. coli sepsis 2022    Enlarged prostate without lower urinary tract symptoms (luts) 2016    Full dentures     GERD  (gastroesophageal reflux disease)     Gout     Hearing aid worn     bilat prn    History of colonoscopy 09/12/2012    History of radiation therapy 02/24/2023    SBRT LLL lung    Kashia (hard of hearing)     hearing aids prn    Hyperlipidemia     Hypertension     Kidney stone     surgery x1    Lung cancer     STEVEN on CPAP     compliant with machine    PAF (paroxysmal atrial fibrillation)     Prostatism     Sleep apnea     CPAP HS    Urinary incontinence     Urinary tract infection     Wears glasses     readers        Past Surgical History:   Procedure Laterality Date    ATRIAL APPENDAGE EXCLUSION LEFT WITH TRANSESOPHAGEAL ECHOCARDIOGRAM N/A 11/28/2023    Procedure: Atrial Appendage Occlusion;  Surgeon: Anthony Mcdaniel MD;  Location:  [facility];  Service: Cardiovascular;  Laterality: N/A;    CARDIAC CATHETERIZATION Left 09/29/2022    Procedure: Left Heart Cath;  Surgeon: Titus Oliveros IV, MD;  Location:  [facility];  Service: Cardiovascular;  Laterality: Left;    CARDIOVERSION      CATARACT EXTRACTION Bilateral     COLONOSCOPY      CYSTOSCOPY      ENDOSCOPY      possible    KIDNEY STONE SURGERY      x1        Patient History       Row Name 06/09/24 1200             History    Chief Complaint Ulcer, wound or other skin conditions;Swelling;Pain  -      Type of Pain Lower Extremity / Leg  -      Brief Description of Current Complaint Pt endorses long history of BLE edema, worsened by heart failure. He had stopped taking his diuretics for a time but is taking them again. He had several blisters open and start weeping. He has finished a round of oral abx. He has not worn compression in the past. He has good family support.  -      Previous treatment for THIS PROBLEM Medication  -      Patient/Caregiver Goals Heal wound;Decrease swelling  -      Patient seeing anyone else for problem(s)? PCP  -      Related/Recent Hospitalizations No  -         Pain     Pain Location Leg   -MC      Pain at Present 0  -MC         Services    Are you currently receiving Home Health services No  -MC      Do you plan to receive Home Health services in the near future No  -MC         Daily Activities    Primary Language English  -MC      Are you able to read Yes  -MC      Are you able to write Yes  -MC      How does patient learn best? Listening;Demonstration  -MC      Teaching needs identified Management of Condition  -MC      Patient is concerned about/has problems with Difficulty with self care (i.e. bathing, dressing, toileting:;Other (comment)  wound, edema management  -      Barriers to learning None  -MC      Pt Participated in POC and Goals Yes  -MC         Safety    Are you being hurt, hit, or frightened by anyone at home or in your life? No  -MC      Are you being neglected by a caregiver No  -MC                User Key  (r) = Recorded By, (t) = Taken By, (c) = Cosigned By      Initials Name Provider Type    Rossana Cabrales PT Physical Therapist                    EVALUATION   PT Ortho       Row Name 06/09/24 1200       Subjective Pain    Able to rate subjective pain? yes  -MC    Pre-Treatment Pain Level 0  -MC    Post-Treatment Pain Level 0  -MC       Transfers    Comment, (Transfers) remained seated in transport chair for tx  -MC              User Key  (r) = Recorded By, (t) = Taken By, (c) = Cosigned By      Initials Name Provider Type    Rossana Cabrales PT Physical Therapist                   LDA Wound       Row Name 06/09/24 1200             Wound 06/09/24 1100 Left anterior leg Venous Ulcer    Wound - Properties Group Placement Date: 06/09/24  -MC Placement Time: 1100  - Present on Original Admission: Y  - Side: Left  - Orientation: anterior  - Location: leg  - Primary Wound Type: Venous ulcer  -MC    Wound Image Images linked: 2  -MC      Dressing Appearance open to air  -MC      Base moist;pink;red  -MC      Periwound intact;dry;redness;swelling;warm  -MC       "Periwound Temperature warm  -MC      Periwound Skin Turgor soft  -MC      Edges irregular;open  -MC      Wound Length (cm) 0.9 cm  -MC      Wound Width (cm) 0.7 cm  -MC      Wound Depth (cm) 0.1 cm  -MC      Wound Surface Area (cm^2) 0.63 cm^2  -MC      Wound Volume (cm^3) 0.063 cm^3  -MC      Drainage Characteristics/Odor serosanguineous  -MC      Drainage Amount small  -      Care, Wound cleansed with;wound cleanser;debrided  -      Dressing Care dressing applied;silver impregnated;low-adherent;foam;border dressing;multi-layer wrap  mepilex Ag, 4\" optifoam, MLW  -      Periwound Care cleansed with pH balanced cleanser;dry periwound area maintained  -      Retired Wound - Properties Group Placement Date: 06/09/24  - Placement Time: 1100  - Present on Original Admission: Y  - Side: Left  - Orientation: anterior  - Location: leg  - Primary Wound Type: Venous ulcer  -MC    Retired Wound - Properties Group Date first assessed: 06/09/24  - Time first assessed: 1100  - Present on Original Admission: Y  - Side: Left  - Location: leg  - Primary Wound Type: Venous ulcer  -MC       Wound 06/09/24 1100 Right anterior leg Venous Ulcer    Wound - Properties Group Placement Date: 06/09/24  - Placement Time: 1100  - Present on Original Admission: Y  - Side: Right  - Orientation: anterior  - Location: leg  - Primary Wound Type: Venous ulcer  -    Wound Image Images linked: 2  -MC      Dressing Appearance open to air  -      Base moist;pink;red;yellow;slough  -MC      Periwound intact;redness;swelling;warm  -MC      Periwound Temperature warm  -MC      Periwound Skin Turgor soft  -MC      Edges irregular;open  -MC      Wound Length (cm) 3.3 cm  -MC      Wound Width (cm) 2.5 cm  -MC      Wound Depth (cm) 0.1 cm  -MC      Wound Surface Area (cm^2) 8.25 cm^2  -MC      Wound Volume (cm^3) 0.825 cm^3  -MC      Drainage Characteristics/Odor serosanguineous  -MC      Drainage Amount moderate  " "-      Care, Wound cleansed with;wound cleanser;debrided  -      Dressing Care dressing applied;silver impregnated;low-adherent;foam;border dressing;multi-layer wrap  mepilex Ag, 4\" optifoam, MLW  -MC      Periwound Care cleansed with pH balanced cleanser;dry periwound area maintained  -      Retired Wound - Properties Group Placement Date: 06/09/24  - Placement Time: 1100  - Present on Original Admission: Y  - Side: Right  - Orientation: anterior  - Location: leg  -MC Primary Wound Type: Venous ulcer  -MC    Retired Wound - Properties Group Date first assessed: 06/09/24  - Time first assessed: 1100  - Present on Original Admission: Y  - Side: Right  - Location: leg  - Primary Wound Type: Venous ulcer  -MC       Wound 06/09/24 1100 Right medial leg Venous Ulcer    Wound - Properties Group Placement Date: 06/09/24  - Placement Time: 1100  - Present on Original Admission: Y  - Side: Right  - Orientation: medial  - Location: leg  - Primary Wound Type: Venous ulcer  -MC    Wound Image Images linked: 1  -      Dressing Appearance open to air  -      Base moist;pink;red;yellow;slough;necrotic  -      Periwound intact;dry;redness;swelling  -      Periwound Temperature warm  -      Periwound Skin Turgor soft  -      Edges irregular;open  -      Wound Length (cm) 2.2 cm  -      Wound Width (cm) 0.9 cm  -      Wound Depth (cm) 0.2 cm  -      Wound Surface Area (cm^2) 1.98 cm^2  -      Wound Volume (cm^3) 0.396 cm^3  -      Drainage Characteristics/Odor yellow;creamy;serous;serosanguineous  -      Drainage Amount moderate  -      Care, Wound cleansed with;wound cleanser;debrided  -      Dressing Care dressing applied;silver impregnated;low-adherent;foam;border dressing  mepilex Ag, 4\" optifoam, MLW  -MC      Periwound Care cleansed with pH balanced cleanser;dry periwound area maintained  -      Retired Wound - Properties Group Placement Date: 06/09/24  - " "Placement Time: 1100  -MC Present on Original Admission: Y  -MC Side: Right  -MC Orientation: medial  -MC Location: leg  -MC Primary Wound Type: Venous ulcer  -MC    Retired Wound - Properties Group Date first assessed: 06/09/24  - Time first assessed: 1100  -MC Present on Original Admission: Y  -MC Side: Right  -MC Location: leg  -MC Primary Wound Type: Venous ulcer  -MC       Wound 06/09/24 1100 Right anterior foot Venous Ulcer    Wound - Properties Group Placement Date: 06/09/24  - Placement Time: 1100  -MC Present on Original Admission: Y  -MC Side: Right  -MC Orientation: anterior  -MC Location: foot  -MC Primary Wound Type: Venous ulcer  -MC    Wound Image Images linked: 1  -MC      Dressing Appearance open to air  -      Base moist;pink;red;yellow;slough  -MC      Periwound intact;dry;redness;swelling;warm  -      Periwound Temperature warm  -      Periwound Skin Turgor soft  -      Edges irregular;open  -      Wound Length (cm) 0.7 cm  -      Wound Width (cm) 1.5 cm  -      Wound Depth (cm) --  obscured  -      Wound Surface Area (cm^2) 1.05 cm^2  -      Drainage Characteristics/Odor serous  -MC      Drainage Amount scant  -MC      Care, Wound cleansed with;wound cleanser  -      Dressing Care dressing applied;low-adherent;foam;border dressing;multi-layer wrap  2\" optifoam, MLW  -MC      Periwound Care cleansed with pH balanced cleanser;dry periwound area maintained  -      Retired Wound - Properties Group Placement Date: 06/09/24  - Placement Time: 1100  -MC Present on Original Admission: Y  -MC Side: Right  -MC Orientation: anterior  -MC Location: foot  -MC Primary Wound Type: Venous ulcer  -MC    Retired Wound - Properties Group Date first assessed: 06/09/24  - Time first assessed: 1100  -MC Present on Original Admission: Y  -MC Side: Right  -MC Location: foot  -MC Primary Wound Type: Venous ulcer  -MC              User Key  (r) = Recorded By, (t) = Taken By, (c) = Cosigned " By      Initials Name Provider Type    Rossana Cabrales, PT Physical Therapist                   Lymphedema       Row Name 06/09/24 1200             Lymphedema Edema Assessment    Ptting Edema Category By severity  -      Pitting Edema Severe  -         Skin Changes/Observations    Location/Assessment Lower Extremity  -      Lower Extremity Conditions bilateral:;clean;dry;shiny;hairless;scab(s);inflamed;weeping  -      Lower Extremity Color/Pigment bilateral:;red;erythema;hyperpigmented  -         Lymphedema Pulses/Capillary Refill    Lymphedema Pulses/Capillary Refill lower extremity pulses;capillary refill  -      Dorsalis Pedis Pulse right:;left:;+1 diminished  -      Posterior Tibialis Pulse right:;left:;+1 diminished  -      Capillary Refill lower extremity capillary refill  -      Lower Extremity Capillary Refill right:;left:;less than 3 seconds  -         Lymphedema Measurements    Measurement Type(s) Quick Girth  -      Quick Girth Areas Lower extremities  -         LLE Quick Girth (cm)    Met-heads 27.6 cm  -      Mid foot 25.8 cm  -      Smallest ankle 25.7 cm  -      Largest calf 40.5 cm  -         RLE Quick Girth (cm)    Met-heads 28.5 cm  -      Mid foot 27 cm  -      Smallest ankle 26.3 cm  -      Largest calf 43 cm  -      RLE Quick Girth Total 124.8  -         Compression/Skin Care    Compression/Skin Care skin care;wrapping location;bandaging  -      Skin Care washed/dried  -      Wrapping Location lower extremity  -      Wrapping Location LE bilateral:;foot to knee  -      Wrapping Comments wound dressings, BLE size 4/5 compressogrips doubled and overlapping to create gradient compression  -      Bandage Layers cotton elastic stocking- double layer (comment size)  -                User Key  (r) = Recorded By, (t) = Taken By, (c) = Cosigned By      Initials Name Provider Type    Rossana Cabrales, PT Physical Therapist                     WOUND DEBRIDEMENT  Total area of Debridement: 10 cm2  Debridement Site 1  Location- Site 1: LLE wound  Selective Debridement- Site 1: Wound Surface <20cmsq  Instruments- Site 1: #15, scapel, tweezers  Excised Tissue Description- Site 1: maximum, other (comment) (scab)  Bleeding- Site 1: seeping, held pressure, 2 minutes   Debridement Site 2  Location- Site 2: RLE wounds  Selective Debridement- Site 2: Wound Surface <20cmsq  Instruments- Site 2: #15, scapel, tweezers  Excised Tissue Description- Site 2: maximum, slough, necrotic  Bleeding- Site 2: scant, held pressure, 1 minute          Therapy Education       Row Name 06/09/24 1200             Therapy Education    Education Details Reviewed s/sx of infection and PT role in wound care. Change dressings every 2-3 days or sooner if wet/disrupted. Maintain MLW and elevate legs as often as able.  -      Given Bandaging/dressing change;Edema management;Symptoms/condition management  -      Program New  -      How Provided Verbal;Demonstration  -      Provided to Patient;Caregiver  -      Level of Understanding Teach back education performed;Verbalized  -                User Key  (r) = Recorded By, (t) = Taken By, (c) = Cosigned By      Initials Name Provider Type    Rossana Cabrales, PT Physical Therapist                    Recommendation and Plan   PT Assessment/Plan       Row Name 06/09/24 1200          PT Assessment    Functional Limitations Performance in self-care ADL;Other (comment)  wound, edema management  -     Impairments Integumentary integrity;Impaired venous circulation;Edema  -     Assessment Comments Pt presents with severe BLE edema with multiple open areas. PT was able to debride large areas of necrotic crust and thick biofilm. Pt will benefit from skilled PT wound care to promote healing.  -     Rehab Potential Good  -     Patient/caregiver participated in establishment of treatment plan and goals Yes  -     Patient would  benefit from skilled therapy intervention Yes  -        PT Plan    PT Frequency 1x/week  -     Predicted Duration of Therapy Intervention (PT) 12 visits  -     Planned CPT's? PT EVAL MOD COMPLELITY: 49958;PT SELF CARE/MGMT/TRAIN 15 MIN: 79385;PT NONSELECT DEBRIDE 15 MIN: 72522;PT JUAREZ DEBRIDE OPEN WOUND UP TO 20 CM: 65233;PT JUAREZ DEBRIDE OPEN WOUND EA ADD 20 CM: 77588;PT UNNA BOOT: 52358;PT MULTI LAYER COMP SYS LE  -     Physical Therapy Interventions (Optional Details) wound care;patient/family education  -     PT Plan Comments debridement, dressings, MLW  -               User Key  (r) = Recorded By, (t) = Taken By, (c) = Cosigned By      Initials Name Provider Type    Rossana Cabrales, PT Physical Therapist                      Goals   PT OP Goals       Row Name 24 1200          PT Short Term Goals    STG 1 Pt will verbalize s/sx of infection and when to seek immediate medical care.  -     STG 2 Pt will demonstrate 25% reduction in RLE wound areas to indicate healing progress.  -     STG 3 Pt will demonstrate only mild BLE edema to indicate appropriate edema management.  -        Long Term Goals    LTG 1 Pt/caregivers will demonstrate independence with clean home dressing changes.  -     LTG 2 Pt will demonstrate 80% reduction in all wound areas to indicate healing progress.  -     LTG 3 Pt/caregivers will demonstrate independence with appropriate edema management system for long term edema management.  -        Time Calculation    PT Goal Re-Cert Due Date 24  -               User Key  (r) = Recorded By, (t) = Taken By, (c) = Cosigned By      Initials Name Provider Type    Rossana Cabrales PT Physical Therapist                    Time Calculation: Start Time: 1100  Untimed Charges  PT Eval/Re-eval Minutes: 45  Wound Care: 12082 Multilayer comp below knee, 30640 Selective debridement  56236-Nnklkavgrw comp below knee: 15  64503-Smwothqqx debridement: 15  Total  Minutes  Untimed Charges Total Minutes: 75   Total Minutes: 75            Rossana Batista, PT  6/9/2024

## 2024-06-10 ENCOUNTER — OFFICE VISIT (OUTPATIENT)
Dept: CARDIOLOGY | Facility: CLINIC | Age: 89
End: 2024-06-10
Payer: MEDICARE

## 2024-06-10 VITALS
BODY MASS INDEX: 31.33 KG/M2 | OXYGEN SATURATION: 97 % | HEIGHT: 74 IN | HEART RATE: 90 BPM | DIASTOLIC BLOOD PRESSURE: 70 MMHG | SYSTOLIC BLOOD PRESSURE: 136 MMHG | WEIGHT: 244.1 LBS

## 2024-06-10 DIAGNOSIS — I48.21 PERMANENT ATRIAL FIBRILLATION: Primary | ICD-10-CM

## 2024-06-10 DIAGNOSIS — I50.23 ACUTE ON CHRONIC SYSTOLIC HEART FAILURE: ICD-10-CM

## 2024-06-10 PROCEDURE — 99214 OFFICE O/P EST MOD 30 MIN: CPT

## 2024-06-10 RX ORDER — METOLAZONE 2.5 MG/1
2.5 TABLET ORAL 2 TIMES WEEKLY
Qty: 10 TABLET | Refills: 1 | Status: SHIPPED | OUTPATIENT
Start: 2024-06-10

## 2024-06-10 NOTE — PROGRESS NOTES
Cardiology Outpatient Visit      Identification: Darien Camarena is a 88 y.o. male who resides in [place], [place]    Reason for visit:  Permanent atrial fibrillation, Coronary Artery Disease, and Leg Swelling    Subjective patient is an 88-year-old with past medical history of CAD, permanent A-fib, and chronic systolic heart failure (EF mildly reduced) who returns accompanied by his daughter for early follow-up due to severe lower extremity edema.  Over the last week he has increased furosemide to 20 mg twice daily.  Physical therapy has been applying compression wraps to lower extremities due to wounds.  Family has been instructed to change dressings twice a week and physical therapy visits him on Sundays.  He is not checking home blood pressures.  He does not have a recent weight.  At time of follow-up with PCP on Tegan 3 he weighed 260 lbs.  He denies any unusual shortness of breath, orthopnea, and PND.  He denies adding salt and consuming high sodium foods.  Mild improvement in edema overnight when legs are elevated. He reports compliance with medications listed below including metoprolol, aspirin, and pravastatin.  His valsartan and metolazone were both stopped earlier this year due to hypotension requiring midodrine.  He has followed with EP service for Watchman surveillance.  He stopped Plavix last month as instructed.  He and his daughter are planning a trip to [place] to see a [employer] music performance this week.     Review of Systems   Cardiovascular:  Positive for leg swelling.   All other systems reviewed and are negative.      Allergies   Allergen Reactions    Xarelto [Rivaroxaban] GI Bleeding     GI bleed    Sglt2 Inhibitors Other (See Comments)     Recurrent UTI    Penicillins Hives     Has tolerated cefepime, ceftriaxone, cefazolin, cefdinir, cefuroxime, cephalexin         Current Outpatient Medications   Medication Instructions    albuterol sulfate  (90 Base) MCG/ACT inhaler 2 puffs,  "Inhalation, Every 4 Hours PRN    allopurinol (ZYLOPRIM) 300 mg, Oral, Daily    ascorbic acid (VITAMIN C) 1,000 mg, Oral, 2 Times Daily    aspirin 81 mg, Oral, Daily, 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.    Coenzyme Q10 300 MG capsule 1 capsule, Oral, Nightly    docusate sodium (COLACE) 200 mg, Oral, Nightly PRN    donepezil (ARICEPT) 10 mg, Oral, Nightly    Ferrous Gluconate (IRON) 240 (27 FE) MG tablet 1 tablet, Oral, Daily    finasteride (PROSCAR) 5 mg, Oral, Every Night at Bedtime    furosemide (LASIX) 20 mg, Oral, Daily    memantine (NAMENDA) 10 mg, Oral, 2 Times Daily    metFORMIN (GLUCOPHAGE) 1000 MG tablet Take 1 tablet by mouth twice daily    methenamine (HIPREX) 1 g, Oral, 2 Times Daily With Meals    metoprolol tartrate (LOPRESSOR) 100 mg, Oral, 2 Times Daily    multivitamin with minerals (MULTIVITAMIN MEN 50+ PO) 1 tablet, Oral, Nightly, Centrum Sliver     omeprazole (PRILOSEC) 20 mg, Oral, Daily    pravastatin (PRAVACHOL) 40 mg, Oral, Daily    Probiotic Product (PROBIOTIC ADVANCED PO) 1 tablet, Oral, 2 Times Daily    sertraline (ZOLOFT) 50 MG tablet Take 1 tablet by mouth once daily    tiotropium bromide-olodaterol (STIOLTO RESPIMAT) 2.5-2.5 MCG/ACT aerosol solution inhaler 2 puffs, Inhalation, Daily, 2 inh once a day       Objective     /70 (BP Location: Left arm, Patient Position: Sitting)   Pulse 90   Ht 188 cm (74\")   Wt 111 kg (244 lb 1.6 oz)   SpO2 97%   BMI 31.34 kg/m²       Vitals reviewed.   Constitutional:       Appearance: Normal appearance. Well-developed and not in distress.      Comments: Transported in wheelchair   Pulmonary:      Effort: Pulmonary effort is normal.      Breath sounds: Normal breath sounds.   Cardiovascular:      Tachycardia present. Irregularly irregular rhythm. Normal S1. Normal S2.       Murmurs: There is no murmur.   Edema:     Peripheral edema present.     Thigh: bilateral 3+ edema of the thigh.     Pretibial: bilateral 3+ edema of the pretibial " area.     Ankle: bilateral 3+ edema of the ankle.     Feet: bilateral 3+ edema of the feet.     Comments: Bilateral lower calf compression wraps noted per physical therapy.  Significant edema above the level of wrap.  Skin:     General: Skin is warm and dry.   Neurological:      General: No focal deficit present.      Mental Status: Alert.   Psychiatric:         Behavior: Behavior is cooperative.         Result Review  (reviewed with patient):            Lab Results   Component Value Date    GLUCOSE 101 (H) 06/03/2024    BUN 28 (H) 06/03/2024    CREATININE 1.07 06/03/2024    EGFR 67.2 06/03/2024    BCR 26.2 (H) 06/03/2024    K 5.1 06/03/2024    CO2 30.0 (H) 06/03/2024    CALCIUM 9.1 06/03/2024    PROTENTOTREF 6.2 05/13/2024    ALBUMIN 3.2 05/13/2024    BILITOT 0.4 05/07/2024    AST 21 05/07/2024    ALT 18 05/07/2024     Lab Results   Component Value Date    WBC 6.90 06/03/2024    HGB 10.4 (L) 06/03/2024    HCT 36.1 (L) 06/03/2024    MCV 94.0 06/03/2024     06/03/2024     Lab Results   Component Value Date    CHOL 75 05/07/2024    TRIG 83 05/07/2024    HDL 26 (L) 05/07/2024    LDL 32 05/07/2024     Lab Results   Component Value Date    HGBA1C 5.90 (H) 05/07/2024       Assessment     Diagnoses and all orders for this visit:    1. Permanent atrial fibrillation (Primary)  Overview:  Diagnosed 2012.   FARHAD-guided ECV, 8/17/2012  CHADS-VASc 5 (age > 75, CAD, HTN, DM)  Multiple cardioversions, 2018, 2019, 2020, 2021  Unsuccessful cardioversion with conversion to rate control strategy, 2023  Echo (8/8/2022): EF 50%, anatomically and functionally normal valves  Watchman implant by Anthony Mcdaniel, 11/28/2023  FARHAD (1/12/2024): LVEF 40%.  27 mm Watchman device well-seated.  No thrombus or periprosthetic flow.   Limited TTE (1/27/2024): LVEF 46 to 50%        2. Acute on chronic systolic heart failure  Overview:  FARHAD (1/12/2024): LVEF 40%.  27 mm Watchman device well-seated.  No thrombus.  Limited Echo (1/27/2024): LVEF  47.8%.-GDMT limited by hypotension.        Other orders  -     metOLazone (ZAROXOLYN) 2.5 MG tablet; Take 1 tablet by mouth 2 (Two) Times a Week. Take Tuesday and Friday with furosemide  Dispense: 10 tablet; Refill: 1      Plan   We will add metolazone 2.5 mg twice weekly to current dose of furosemide (20 mg twice daily).   Daughter will update our office re: symptoms and daily weights on Friday or Monday.  Continue to monitor heart rate, 90s to 100s by auscultation today despite metoprolol 100 mg twice daily.  Will attempt to add back ARB if BP remains stable.  May need reevaluation with EP regarding rate control.  We briefly discussed AVN ablation/PPM.    Follow-up   Will determine if short-term follow-up required after symptoms/weight update.  I will be happy to see him back.  Return for scheduled follow-up with Dr. Oliveros.  May benefit from follow-up with heart and valve clinic to monitor need for IV diuresis.      Deanne Arreola, APRN   6/10/2024

## 2024-06-10 NOTE — ASSESSMENT & PLAN NOTE
January 2024 required discontinuation of valsartan and metolazone and brief utilization of midodrine.  6/3/2024 elevated proBNP, significant lower extremity edema

## 2024-06-10 NOTE — ASSESSMENT & PLAN NOTE
January 2024 required discontinuation of valsartan and metolazone and brief utilization of midodrine.  6/3/2024 elevated proBNP, significant lower extremity edema  Will add metolazone 2.5 mg twice weekly to current dose of furosemide.  Will attempt to add back ARB if BP remains stable.

## 2024-06-10 NOTE — ASSESSMENT & PLAN NOTE
HR 90s-100s today despite  Continue to monitor heart rate.  Continue metoprolol 100 mg twice daily.  May need reevaluation with the EP regarding rate control.  We briefly discussed AV node ablation/PPM.

## 2024-06-13 ENCOUNTER — PATIENT MESSAGE (OUTPATIENT)
Dept: INTERNAL MEDICINE | Facility: CLINIC | Age: 89
End: 2024-06-13
Payer: MEDICARE

## 2024-06-13 DIAGNOSIS — R41.3 MEMORY LOSS: ICD-10-CM

## 2024-06-13 DIAGNOSIS — E78.5 HYPERLIPIDEMIA LDL GOAL <100: ICD-10-CM

## 2024-06-13 DIAGNOSIS — F32.9 REACTIVE DEPRESSION: ICD-10-CM

## 2024-06-13 DIAGNOSIS — M1A.9XX0 CHRONIC GOUT WITHOUT TOPHUS, UNSPECIFIED CAUSE, UNSPECIFIED SITE: ICD-10-CM

## 2024-06-13 RX ORDER — ALBUTEROL SULFATE 90 UG/1
2 AEROSOL, METERED RESPIRATORY (INHALATION) EVERY 4 HOURS PRN
Qty: 54 G | Refills: 1 | Status: SHIPPED | OUTPATIENT
Start: 2024-06-13

## 2024-06-13 NOTE — TELEPHONE ENCOUNTER
From: Darien Camarena  To: Pito Way  Sent: 6/13/2024 11:23 AM EDT  Subject: Refills    We are switching all of my dad’s medication to be refilled through [facility] mail order and will need 90 day supply please

## 2024-06-16 ENCOUNTER — HOSPITAL ENCOUNTER (OUTPATIENT)
Dept: PHYSICAL THERAPY | Facility: HOSPITAL | Age: 89
Setting detail: THERAPIES SERIES
Discharge: HOME | End: 2024-06-16
Payer: MEDICARE

## 2024-06-16 DIAGNOSIS — R60.0 BILATERAL LOWER EXTREMITY EDEMA: Primary | ICD-10-CM

## 2024-06-16 DIAGNOSIS — S81.801A MULTIPLE OPEN WOUNDS OF RIGHT LOWER LEG: ICD-10-CM

## 2024-06-16 DIAGNOSIS — S81.802D OPEN WOUND OF LEFT LOWER LEG, SUBSEQUENT ENCOUNTER: ICD-10-CM

## 2024-06-16 PROCEDURE — 29581 APPL MULTLAYER CMPRN SYS LEG: CPT

## 2024-06-16 NOTE — THERAPY WOUND CARE TREATMENT
Outpatient Rehabilitation - Wound/Debridement Treatment Note   [place]     Patient Name: Darien Camarena  : 1935  MRN: [id number]  Today's Date: 2024                 Admit Date: 2024    Visit Dx:    ICD-10-CM ICD-9-CM   1. Bilateral lower extremity edema  R60.0 782.3   2. Open wound of left lower leg, subsequent encounter  S81.802D V58.89     891.0   3. Multiple open wounds of right lower leg  S81.801A 894.0       Patient Active Problem List   Diagnosis    Atopic rhinitis    Benign (familial) paraproteinemia    Type 2 diabetes mellitus with stage 3 chronic kidney disease, without long-term current use of insulin    Enlarged prostate without lower urinary tract symptoms (luts)    Gastroesophageal reflux disease with esophagitis    Gout    Hyperlipidemia LDL goal <100    Essential hypertension    Nephrolithiasis    Obstructive sleep apnea syndrome    Permanent atrial fibrillation    Stage 3 chronic kidney disease    Long term current use of antiarrhythmic medical therapy    Hydronephrosis    Coronary artery disease involving native coronary artery of native heart without angina pectoris    Malignant neoplasm of lower lobe of left lung    Chronic bilateral low back pain without sciatica    Other microscopic hematuria    H/O: GI bleed    Presence of Watchman left atrial appendage closure device    Recurrent UTI    Heart failure with mildly reduced ejection fraction (HFmrEF)    Acute on chronic systolic heart failure    Obesity    Somnolence, daytime        Past Medical History:   Diagnosis Date    Arrhythmia     Atrial fibrillation     Bilateral leg cramps     possible new medication related side effects    Chronic kidney disease     Clotting disorder     pt doesnt know about this    COPD (chronic obstructive pulmonary disease)     Coronary artery disease     Diabetes mellitus     doesnt check sugar    E. coli sepsis 2022    Enlarged prostate without lower urinary tract symptoms (luts)  06/20/2016    Full dentures     GERD (gastroesophageal reflux disease)     Gout     Hearing aid worn     bilat prn    History of colonoscopy 09/12/2012    History of radiation therapy 02/24/2023    SBRT LLL lung    [place] (hard of hearing)     hearing aids prn    Hyperlipidemia     Hypertension     Kidney stone     surgery x1    Lung cancer     STEVEN on CPAP     compliant with machine    PAF (paroxysmal atrial fibrillation)     Prostatism     Sleep apnea     CPAP HS    Urinary incontinence     Urinary tract infection     Wears glasses     readers        Past Surgical History:   Procedure Laterality Date    ATRIAL APPENDAGE EXCLUSION LEFT WITH TRANSESOPHAGEAL ECHOCARDIOGRAM N/A 11/28/2023    Procedure: Atrial Appendage Occlusion;  Surgeon: Anthony Mcdaniel MD;  Location:  [facility];  Service: Cardiovascular;  Laterality: N/A;    CARDIAC CATHETERIZATION Left 09/29/2022    Procedure: Left Heart Cath;  Surgeon: Titus Oliveros IV, MD;  Location:  [facility];  Service: Cardiovascular;  Laterality: Left;    CARDIOVERSION      CATARACT EXTRACTION Bilateral     COLONOSCOPY      CYSTOSCOPY      ENDOSCOPY      possible    KIDNEY STONE SURGERY      x1         EVALUATION   PT Ortho       Row Name 06/16/24 1100       Subjective    Subjective Comments pt with no new issues, but mild c/o pain with LE compression  -       Subjective Pain    Able to rate subjective pain? yes  -MF    Pre-Treatment Pain Level 0  -MF    Post-Treatment Pain Level 0  -MF       Transfers    Comment, (Transfers) remained seated in transport chair for tx  -MF              User Key  (r) = Recorded By, (t) = Taken By, (c) = Cosigned By      Initials Name Provider Type    Pito Sylvester, PT Physical Therapist                       Lymphedema       Row Name 06/16/24 1100             Lymphedema Edema Assessment    Ptting Edema Category By severity  -      Pitting Edema Mild  -         Compression/Skin Care     Compression/Skin Care skin care;wrapping location;bandaging  -      Skin Care washed/dried  -      Wrapping Location lower extremity  -      Wrapping Location LE bilateral:;foot to knee  -      Wrapping Comments wound dressings with velcro compression stockings size medium  -                User Key  (r) = Recorded By, (t) = Taken By, (c) = Cosigned By      Initials Name Provider Type    Pito Sylvester, PT Physical Therapist                    WOUND DEBRIDEMENT                       Recommendation and Plan   PT Assessment/Plan       Row Name 06/16/24 1100          PT Assessment    Functional Limitations Performance in self-care ADL;Other (comment)  wound and edema management  -     Impairments Integumentary integrity;Impaired venous circulation;Edema  -     Assessment Comments Pt with moderate improvement in LE Edema and good reepithelialization of wounds noted.  PT transitioned to velcro compression stockings to allow pt to adjust compression as needed for comfort.  -     Rehab Potential Good  -     Patient/caregiver participated in establishment of treatment plan and goals Yes  -     Patient would benefit from skilled therapy intervention Yes  -        PT Plan    PT Frequency 1x/week  -     Physical Therapy Interventions (Optional Details) wound care;patient/family education  -     PT Plan Comments debridement prn with compression wrapping.  -               User Key  (r) = Recorded By, (t) = Taken By, (c) = Cosigned By      Initials Name Provider Type    Pito Sylvester, PT Physical Therapist                    Goals   PT OP Goals       Row Name 06/16/24 1100          Time Calculation    PT Goal Re-Cert Due Date 09/07/24  -               User Key  (r) = Recorded By, (t) = Taken By, (c) = Cosigned By      Initials Name Provider Type    Pito Sylvester, PT Physical Therapist                    PT Goal Re-Cert Due Date: 09/07/24            Time Calculation: Start Time:  1100  Untimed Charges  70474-Zkogyeqhaf comp below knee: 25  Total Minutes  Untimed Charges Total Minutes: 25   Total Minutes: 25              Pito Hall, PT  6/16/2024

## 2024-06-17 NOTE — PROGRESS NOTES
Sleep Patient Office Visit      Patient Name: Darien Camarena    Referring Physician: No ref. provider found    Chief Complaint:    Chief Complaint   Patient presents with    Follow-up    Sleep Apnea     History of Present Illness: Darien Camarena is a 88 y.o. male who is here today to follow-up with Sleep Medicine.     The patient establish care with sleep medicine in April and was evaluated by me.  He does have a history of STEVEN on PAP therapy at 5 cm H2O for 25 years and had symptoms of daytime somnolence and lethargy raising the concern for insufficient therapy.  He declined an in lab split study, therefore a home sleep study was performed that showed an AHI of 25.3 in a supine position consistent with STEVEN thus was re-implemented on PAP therapy.     Since his last visit he has not received his new machine and is not utilizing his prior PAP therapy.    He is supposed to receive his new machine sometime in early-mid July.    Per the daughter with his old machine he has not been routinely cleaning this daily and has been using tap water in the reservoir.    [place] score of 18/24.      He is accompanied today by his daughter.     Denies fever, chills, night sweats, or hemoptysis. No recent sick contacts. No chest pain or palpitations. Denies lower extremity swelling or calf tenderness.     Subjective      Review of Systems:   Review of Systems   Constitutional:  Positive for fatigue.   Respiratory: Negative.     Cardiovascular: Negative.    Psychiatric/Behavioral:  Positive for sleep disturbance.        Past Medical History:   Past Medical History:   Diagnosis Date    Arrhythmia     Atrial fibrillation     Bilateral leg cramps     possible new medication related side effects    Chronic kidney disease     Clotting disorder     pt doesnt know about this    COPD (chronic obstructive pulmonary disease)     Coronary artery disease     Diabetes mellitus     doesnt check sugar    E. coli sepsis 06/23/2022    Enlarged  prostate without lower urinary tract symptoms (luts) 2016    Full dentures     GERD (gastroesophageal reflux disease)     Gout     Hearing aid worn     bilat prn    History of colonoscopy 2012    History of radiation therapy 2023    SBRT LLL lung    [place] (hard of hearing)     hearing aids prn    Hyperlipidemia     Hypertension     Kidney stone     surgery x1    Lung cancer     STEVEN on CPAP     compliant with machine    PAF (paroxysmal atrial fibrillation)     Prostatism     Sleep apnea     CPAP HS    Urinary incontinence     Urinary tract infection     Wears glasses     readers       Past Surgical History:   Past Surgical History:   Procedure Laterality Date    ATRIAL APPENDAGE EXCLUSION LEFT WITH TRANSESOPHAGEAL ECHOCARDIOGRAM N/A 2023    Procedure: Atrial Appendage Occlusion;  Surgeon: Anthony Mcdaniel MD;  Location:  [facility];  Service: Cardiovascular;  Laterality: N/A;    CARDIAC CATHETERIZATION Left 2022    Procedure: Left Heart Cath;  Surgeon: Titus Oliveros IV, MD;  Location:  [facility];  Service: Cardiovascular;  Laterality: Left;    CARDIOVERSION      CATARACT EXTRACTION Bilateral     COLONOSCOPY      CYSTOSCOPY      ENDOSCOPY      possible    KIDNEY STONE SURGERY      x1       Family History:   Family History   Problem Relation Age of Onset    Alzheimer's disease Mother     COPD Father     Lung cancer Father     Cancer Father     Kidney disease Father     No Known Problems Daughter     No Known Problems Daughter     No Known Problems Daughter        Social History:   Social History     Socioeconomic History    Marital status:    Tobacco Use    Smoking status: Former     Current packs/day: 0.00     Average packs/day: 1 pack/day for 15.0 years (15.0 ttl pk-yrs)     Types: Cigarettes     Start date: 1947     Quit date: 1960     Years since quittin.1     Passive exposure: Past    Smokeless tobacco: Former     Types:  Chew     Quit date: 2011    Tobacco comments:     started at 13 yo.   Vaping Use    Vaping status: Never Used    Passive vaping exposure: Yes   Substance and Sexual Activity    Alcohol use: No    Drug use: Never    Sexual activity: Not Currently     Partners: Female       Medications:     Current Outpatient Medications:     albuterol sulfate  (90 Base) MCG/ACT inhaler, Inhale 2 puffs Every 4 (Four) Hours As Needed for Wheezing., Disp: 54 g, Rfl: 1    allopurinol (ZYLOPRIM) 300 MG tablet, Take 1 tablet by mouth Daily., Disp: 90 tablet, Rfl: 0    ascorbic acid (VITAMIN C) 1000 MG tablet, Take 1 tablet by mouth 2 (Two) Times a Day., Disp: , Rfl:     aspirin 81 MG EC tablet, Take 1 tablet by mouth Daily. 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., Disp: 90 tablet, Rfl: 1    Coenzyme Q10 300 MG capsule, Take 1 capsule by mouth Every Night., Disp: , Rfl:     docusate sodium (COLACE) 100 MG capsule, Take 2 capsules by mouth At Night As Needed for Constipation., Disp: , Rfl:     donepezil (ARICEPT) 10 MG tablet, TAKE 1 TABLET BY MOUTH ONCE DAILY AT NIGHT, Disp: 90 tablet, Rfl: 0    Ferrous Gluconate (IRON) 240 (27 FE) MG tablet, Take 1 tablet by mouth Daily., Disp: , Rfl:     finasteride (PROSCAR) 5 MG tablet, TAKE 1 TABLET BY MOUTH ONCE DAILY AT BEDTIME, Disp: 90 tablet, Rfl: 0    furosemide (LASIX) 20 MG tablet, Take 1 tablet by mouth Daily., Disp: 90 tablet, Rfl: 1    memantine (NAMENDA) 10 MG tablet, Take 1 tablet by mouth twice daily, Disp: 180 tablet, Rfl: 0    metFORMIN (GLUCOPHAGE) 1000 MG tablet, Take 1 tablet by mouth twice daily, Disp: 180 tablet, Rfl: 1    methenamine (HIPREX) 1 g tablet, Take 1 tablet by mouth 2 (Two) Times a Day With Meals., Disp: , Rfl:     metOLazone (ZAROXOLYN) 2.5 MG tablet, Take 1 tablet by mouth 2 (Two) Times a Week. Take Tuesday and Friday with furosemide, Disp: 10 tablet, Rfl: 1    metoprolol tartrate (LOPRESSOR) 100 MG tablet, Take 1 tablet by mouth 2 (Two) Times a Day., Disp:  "180 tablet, Rfl: 3    multivitamin with minerals (MULTIVITAMIN MEN 50+ PO), Take 1 tablet by mouth Every Night. Abum Sliver, Disp: , Rfl:     omeprazole (priLOSEC) 20 MG capsule, Take 1 capsule by mouth once daily, Disp: 90 capsule, Rfl: 0    pravastatin (PRAVACHOL) 40 MG tablet, Take 1 tablet by mouth once daily, Disp: 90 tablet, Rfl: 0    Probiotic Product (PROBIOTIC ADVANCED PO), Take 1 tablet by mouth 2 (Two) Times a Day., Disp: , Rfl:     sertraline (ZOLOFT) 50 MG tablet, Take 1 tablet by mouth once daily, Disp: 90 tablet, Rfl: 0    tiotropium bromide-olodaterol (STIOLTO RESPIMAT) 2.5-2.5 MCG/ACT aerosol solution inhaler, Inhale 2 puffs Daily. 2 inh once a day, Disp: 3 each, Rfl: 3    Allergies:   Allergies   Allergen Reactions    Xarelto [Rivaroxaban] GI Bleeding     GI bleed    Sglt2 Inhibitors Other (See Comments)     Recurrent UTI    Penicillins Hives     Has tolerated cefepime, ceftriaxone, cefazolin, cefdinir, cefuroxime, cephalexin       Objective     Physical Exam:  Vital Signs:   Vitals:    06/19/24 1000   BP: 112/72   Pulse: 111   Temp: 98.7 °F (37.1 °C)   SpO2: 91%   Weight: 99.3 kg (219 lb)   Height: 188 cm (74.02\")     Body mass index is 28.11 kg/m².    Physical Exam  Vitals reviewed.   Constitutional:       General: He is not in acute distress.     Appearance: Normal appearance.      Comments: Elderly gentleman who ambulates with a cane   Cardiovascular:      Rate and Rhythm: Normal rate and regular rhythm.      Pulses: Normal pulses.      Heart sounds: Normal heart sounds.   Pulmonary:      Effort: Pulmonary effort is normal. No respiratory distress.      Breath sounds: No wheezing, rhonchi or rales.   Skin:     General: Skin is warm and dry.   Neurological:      Mental Status: He is alert.         Results Review:   I reviewed the patient's new clinical results.  Lab Results   Component Value Date    TSH 3.690 05/07/2024       Assessment / Plan      Assessment:   Problem List Items Addressed " This Visit       Obstructive sleep apnea syndrome - Primary    Overview     Compliant with CPAP         Obesity    Daytime somnolence     Plan:      Mr. Camarena was seen today for follow-up and carries a prior history of STEVEN on 5 cm H2O PAP therapy with ongoing symptoms of hypersomnolence and fatigue concerning for insufficient PAP therapy.    He underwent a repeat sleep study that reiterated sleep apnea with an AHI of 25.3.  A new PAP prescription was ordered, however he has not received his machine yet.  He has not been using his old machine.    STEVEN  Will receive a new AutoPap therapy sometime in early-mid July-I do encourage compliance and will follow-up with him 31-90 days after inflammation of his new AutoPap therapy  In the interim I do encourage him to wear his current CPAP which is set to 5 cm H2O  Denies need for additional supplies  Daughter reports that he does not routinely clean his PAP therapy and is using tap water in the reservoir.  I explained in detail how to clean his PAP tubing and to utilize distilled water in the reservoir only.  Explained that untreated sleep apnea can contribute to additional health problems including cardiac and metabolic issues.   We also discussed good sleep regimen such as laying in the lateral position, avoiding alcohol or sedatives prior to bedtime, regular exercise, weight loss, avoiding caffeine in the evenings, and going to bed and getting up at the same time every day.   Also encourage 30 minutes of intentional activity daily.  Given his age he is very mobile and ambulates with a cane.    Follow Up:   Return in about 2 months (around 8/19/2024).    I personally spent a total of 20 minutes on patient visit today including chart review, face to face with the patient obtaining the history and physical exam, review of pertinent images and tests, counseling and discussion and/or coordination of care as described above, and documentation.  Total time excludes time spent on  other separate services such as performing procedures or test interpretation, if applicable.    Electronically signed by BERYL Perla, 06/19/24, 10:27 AM EDT.    Please note that portions of this note were completed with a voice recognition program.

## 2024-06-19 ENCOUNTER — OFFICE VISIT (OUTPATIENT)
Dept: SLEEP MEDICINE | Facility: CLINIC | Age: 89
End: 2024-06-19
Payer: MEDICARE

## 2024-06-19 VITALS
HEIGHT: 74 IN | TEMPERATURE: 98.7 F | BODY MASS INDEX: 28.11 KG/M2 | OXYGEN SATURATION: 91 % | HEART RATE: 111 BPM | DIASTOLIC BLOOD PRESSURE: 72 MMHG | WEIGHT: 219 LBS | SYSTOLIC BLOOD PRESSURE: 112 MMHG

## 2024-06-19 DIAGNOSIS — G47.33 OBSTRUCTIVE SLEEP APNEA SYNDROME: Primary | ICD-10-CM

## 2024-06-19 DIAGNOSIS — E66.9 OBESITY, UNSPECIFIED CLASSIFICATION, UNSPECIFIED OBESITY TYPE, UNSPECIFIED WHETHER SERIOUS COMORBIDITY PRESENT: ICD-10-CM

## 2024-06-19 DIAGNOSIS — R40.0 SOMNOLENCE, DAYTIME: ICD-10-CM

## 2024-06-19 PROCEDURE — 1159F MED LIST DOCD IN RCRD: CPT | Performed by: NURSE PRACTITIONER

## 2024-06-19 PROCEDURE — 99213 OFFICE O/P EST LOW 20 MIN: CPT | Performed by: NURSE PRACTITIONER

## 2024-06-19 PROCEDURE — 1160F RVW MEDS BY RX/DR IN RCRD: CPT | Performed by: NURSE PRACTITIONER

## 2024-06-23 ENCOUNTER — HOSPITAL ENCOUNTER (OUTPATIENT)
Dept: PHYSICAL THERAPY | Facility: HOSPITAL | Age: 89
Setting detail: THERAPIES SERIES
Discharge: HOME | End: 2024-06-23
Payer: MEDICARE

## 2024-06-23 DIAGNOSIS — S81.802D OPEN WOUND OF LEFT LOWER LEG, SUBSEQUENT ENCOUNTER: ICD-10-CM

## 2024-06-23 DIAGNOSIS — S81.801A MULTIPLE OPEN WOUNDS OF RIGHT LOWER LEG: ICD-10-CM

## 2024-06-23 DIAGNOSIS — R60.0 BILATERAL LOWER EXTREMITY EDEMA: Primary | ICD-10-CM

## 2024-06-23 PROCEDURE — 97597 DBRDMT OPN WND 1ST 20 CM/<: CPT

## 2024-06-23 NOTE — THERAPY WOUND CARE TREATMENT
Outpatient Rehabilitation - Wound/Debridement Treatment Note   [facility]     Patient Name: Darien Camarena  : 1935  MRN: [id number]  Today's Date: 2024                 Admit Date: 2024    Visit Dx:    ICD-10-CM ICD-9-CM   1. Bilateral lower extremity edema  R60.0 782.3   2. Open wound of left lower leg, subsequent encounter  S81.802D V58.89     891.0   3. Multiple open wounds of right lower leg  S81.801A 894.0   RLE:    L great toe:      Patient Active Problem List   Diagnosis    Atopic rhinitis    Benign (familial) paraproteinemia    Type 2 diabetes mellitus with stage 3 chronic kidney disease, without long-term current use of insulin    Enlarged prostate without lower urinary tract symptoms (luts)    Gastroesophageal reflux disease with esophagitis    Gout    Hyperlipidemia LDL goal <100    Essential hypertension    Nephrolithiasis    Obstructive sleep apnea syndrome    Permanent atrial fibrillation    Stage 3 chronic kidney disease    Long term current use of antiarrhythmic medical therapy    Hydronephrosis    Coronary artery disease involving native coronary artery of native heart without angina pectoris    Malignant neoplasm of lower lobe of left lung    Chronic bilateral low back pain without sciatica    Other microscopic hematuria    H/O: GI bleed    Presence of Watchman left atrial appendage closure device    Recurrent UTI    Heart failure with mildly reduced ejection fraction (HFmrEF)    Acute on chronic systolic heart failure    Obesity    Daytime somnolence        Past Medical History:   Diagnosis Date    Arrhythmia     Atrial fibrillation     Bilateral leg cramps     possible new medication related side effects    Chronic kidney disease     Clotting disorder     pt doesnt know about this    COPD (chronic obstructive pulmonary disease)     Coronary artery disease     Diabetes mellitus     doesnt check sugar    E. coli sepsis 2022    Enlarged prostate without lower urinary  tract symptoms (luts) 06/20/2016    Full dentures     GERD (gastroesophageal reflux disease)     Gout     Hearing aid worn     bilat prn    History of colonoscopy 09/12/2012    History of radiation therapy 02/24/2023    SBRT LLL lung    Knik (hard of hearing)     hearing aids prn    Hyperlipidemia     Hypertension     Kidney stone     surgery x1    Lung cancer     STEVEN on CPAP     compliant with machine    PAF (paroxysmal atrial fibrillation)     Prostatism     Sleep apnea     CPAP HS    Urinary incontinence     Urinary tract infection     Wears glasses     readers        Past Surgical History:   Procedure Laterality Date    ATRIAL APPENDAGE EXCLUSION LEFT WITH TRANSESOPHAGEAL ECHOCARDIOGRAM N/A 11/28/2023    Procedure: Atrial Appendage Occlusion;  Surgeon: Anthony Mcdaniel MD;  Location:  [facility];  Service: Cardiovascular;  Laterality: N/A;    CARDIAC CATHETERIZATION Left 09/29/2022    Procedure: Left Heart Cath;  Surgeon: Titus Oliveros IV, MD;  Location:  [facility];  Service: Cardiovascular;  Laterality: Left;    CARDIOVERSION      CATARACT EXTRACTION Bilateral     COLONOSCOPY      CYSTOSCOPY      ENDOSCOPY      possible    KIDNEY STONE SURGERY      x1         EVALUATION   PT Ortho       Row Name 06/23/24 1030       Subjective    Subjective Comments No complaints, feels edema is mostly resolved, so only wearing the sock part of the velcro system today.  Plans to purchase compression socks to use long-term with use of velcro wraps if having a flare-up or unable to don stockings.  -CHRIS       Subjective Pain    Able to rate subjective pain? yes  -CHRIS    Pre-Treatment Pain Level 0  -CHRIS    Post-Treatment Pain Level 0  -       Transfers    Comment, (Transfers) remained seated in transport chair  -CHRIS              User Key  (r) = Recorded By, (t) = Taken By, (c) = Cosigned By      Initials Name Provider Type    Lindsey Maxwell, PT Physical Therapist                     LDA  Wound       Row Name 06/23/24 1030             Wound 06/09/24 1100 Left anterior leg Venous Ulcer    Wound - Properties Group Placement Date: 06/09/24  - Placement Time: 1100  - Present on Original Admission: Y  - Side: Left  - Orientation: anterior  - Location: leg  -MC Primary Wound Type: Venous ulcer  -MC    Wound Image Images linked: 1  -JM      Dressing Appearance open to air;moist drainage  -JM      Base moist;pink;red;other (see comments)  great toe nail bed moist/red with min slough, LLE intact otherwise  -      Periwound intact;dry;redness;swelling;warm  -      Periwound Temperature warm  -      Periwound Skin Turgor soft  -      Edges irregular;open  -      Drainage Characteristics/Odor serosanguineous  -      Drainage Amount scant  from great toe  -      Care, Wound cleansed with;wound cleanser;debrided  -      Dressing Care dressing applied;other (see comments)  small bandaid  -      Periwound Care cleansed with pH balanced cleanser;dry periwound area maintained  -      Retired Wound - Properties Group Placement Date: 06/09/24  - Placement Time: 1100  - Present on Original Admission: Y  - Side: Left  - Orientation: anterior  - Location: leg  - Primary Wound Type: Venous ulcer  -    Retired Wound - Properties Group Date first assessed: 06/09/24  - Time first assessed: 1100  - Present on Original Admission: Y  - Side: Left  - Location: leg  - Primary Wound Type: Venous ulcer  -MC       Wound 06/09/24 1100 Right anterior leg Venous Ulcer    Wound - Properties Group Placement Date: 06/09/24  - Placement Time: 1100  - Present on Original Admission: Y  - Side: Right  - Orientation: anterior  - Location: leg  - Primary Wound Type: Venous ulcer  -    Wound Image Images linked: 1  -JM      Dressing Appearance intact;moist drainage  -      Base moist;pink;red;yellow;slough;epithelialization  -      Periwound intact;swelling;pink  -JM       "Periwound Temperature warm  -      Periwound Skin Turgor soft  -JM      Edges irregular;open  -JM      Wound Length (cm) 1.3 cm  -JM      Wound Width (cm) 0.7 cm  -JM      Wound Depth (cm) 0.1 cm  -JM      Wound Surface Area (cm^2) 0.91 cm^2  -JM      Wound Volume (cm^3) 0.091 cm^3  -JM      Drainage Characteristics/Odor serosanguineous  -JM      Drainage Amount small  -JM      Care, Wound cleansed with;wound cleanser;debrided  -JM      Dressing Care dressing applied;silver impregnated;foam;border dressing  mepilex ag, 4\" optifoam  -      Periwound Care cleansed with pH balanced cleanser;dry periwound area maintained  -      Retired Wound - Properties Group Placement Date: 06/09/24  - Placement Time: 1100  - Present on Original Admission: Y  [facility] Side: Right  - Orientation: anterior  - Location: leg  - Primary Wound Type: Venous ulcer  -MC    Retired Wound - Properties Group Date first assessed: 06/09/24  - Time first assessed: 1100  - Present on Original Admission: Y  - Side: Right  - Location: leg  - Primary Wound Type: Venous ulcer  -MC       Wound 06/09/24 1100 Right medial leg Venous Ulcer    Wound - Properties Group Placement Date: 06/09/24  - Placement Time: 1100  - Present on Original Admission: Y  - Side: Right  - Orientation: medial  [facility] Location: leg  - Primary Wound Type: Venous ulcer  -    Dressing Appearance open to air  -      Base closed/resurfaced;epithelialization;pink  -      Periwound intact;dry;blanchable  -      Periwound Temperature warm  -      Periwound Skin Turgor soft  -JM      Wound Length (cm) 0 cm  -JM      Wound Width (cm) 0 cm  -JM      Wound Depth (cm) 0 cm  -JM      Wound Surface Area (cm^2) 0 cm^2  -JM      Wound Volume (cm^3) 0 cm^3  -JM      Retired Wound - Properties Group Placement Date: 06/09/24  - Placement Time: 1100  - Present on Original Admission: Y  - Side: Right  - Orientation: medial  - Location: leg  -MC Primary " Wound Type: Venous ulcer  -MC    Retired Wound - Properties Group Date first assessed: 06/09/24  - Time first assessed: 1100  -MC Present on Original Admission: Y  -MC Side: Right  -MC Location: leg  -MC Primary Wound Type: Venous ulcer  -MC       Wound 06/09/24 1100 Right anterior foot Venous Ulcer    Wound - Properties Group Placement Date: 06/09/24  - Placement Time: 1100  -MC Present on Original Admission: Y  -MC Side: Right  - Orientation: anterior  -MC Location: foot  -MC Primary Wound Type: Venous ulcer  -MC    Dressing Appearance open to air  -JM      Base pink;closed/resurfaced;epithelialization  -JM      Wound Length (cm) 0 cm  -JM      Wound Width (cm) 0 cm  -JM      Wound Depth (cm) 0 cm  -JM      Wound Surface Area (cm^2) 0 cm^2  -JM      Wound Volume (cm^3) 0 cm^3  -JM      Retired Wound - Properties Group Placement Date: 06/09/24  - Placement Time: 1100  - Present on Original Admission: Y  - Side: Right  - Orientation: anterior  -MC Location: foot  -MC Primary Wound Type: Venous ulcer  -MC    Retired Wound - Properties Group Date first assessed: 06/09/24  - Time first assessed: 1100  - Present on Original Admission: Y  - Side: Right  - Location: foot  -MC Primary Wound Type: Venous ulcer  -MC              User Key  (r) = Recorded By, (t) = Taken By, (c) = Cosigned By      Initials Name Provider Type    Rossana Cabrales, PT Physical Therapist    Lindsey Maxwell, PT Physical Therapist                   Lymphedema       Row Name 06/23/24 1030             Lymphedema Edema Assessment    Pitting Edema Mild  -JM         Skin Changes/Observations    Lower Extremity Conditions bilateral:;clean;dry  -JM      Lower Extremity Color/Pigment bilateral:;blanchable;hyperpigmented  -JM         LLE Quick Girth (cm)    Mid foot 23.8 cm  -JM      Smallest ankle 23 cm  -JM      Largest calf 37 cm  -JM         RLE Quick Girth (cm)    Mid foot 24.7 cm  -JM      Smallest ankle 23.7 cm  -JM       Largest calf 38.5 cm  -CHRIS      RLE Quick Girth Total 86.9  -CHRIS         Compression/Skin Care    Skin Care washed/dried  -CHRIS      Compression/Skin Care Comments compression sock from velcro-closure system only per pt request  -CHRIS                User Key  (r) = Recorded By, (t) = Taken By, (c) = Cosigned By      Initials Name Provider Type    Lindsey Maxwell, PT Physical Therapist                    WOUND DEBRIDEMENT  Total area of Debridement: 2cmsq  Debridement Site 1  Location- Site 1: RLE wound  Selective Debridement- Site 1: Wound Surface <20cmsq  Instruments- Site 1: tweezers  Excised Tissue Description- Site 1: minimum, slough  Bleeding- Site 1: none   Debridement Site 2  Location- Site 2: L great toe  Selective Debridement- Site 2: Wound Surface <20cmsq  Instruments- Site 2: tweezers  Excised Tissue Description- Site 2: moderate, slough  Bleeding- Site 2: none          Therapy Education       Row Name 06/23/24 1030             Therapy Education    Education Details Continue dressing to RLE until area fully closed.  Educated on daily use of compresison stockings/socks, may remove at night, daily skin checks and leg elevation when sitting.  Pt would fit into Juzo size III stockings, recommended 20-30mmHg knee-high stockings that pt can obtain from DME provider.  -CHRIS      Given Bandaging/dressing change;Edema management;Symptoms/condition management  -CHRIS      Program Reinforced;Progressed  -CHRIS      How Provided Verbal;Demonstration;Written  -CHRIS      Provided to Patient;Caregiver  -CHRIS      Level of Understanding Teach back education performed;Verbalized  -CHRIS                User Key  (r) = Recorded By, (t) = Taken By, (c) = Cosigned By      Initials Name Provider Type    Lindsey Maxwell, PT Physical Therapist                    Recommendation and Plan   PT Assessment/Plan       Row Name 06/23/24 1030          PT Assessment    Functional Limitations Performance in self-care ADL;Other (comment)  wound  and edema management  -     Impairments Integumentary integrity;Impaired venous circulation;Edema  -     Assessment Comments Pt with closure of LLE wounds and RLE foot and medial calf wounds.  Only open areas remaining to anterior RLE and L great toe nail bed.  Pt likely to have wound closure with continued home dressing changes, and wishes to try home management, will call for appt PRN next 30 days.  Pt and family verbalizing understanding of home dressing changes and use of compression for long-term edema management.  -     Rehab Potential Good  -     Patient/caregiver participated in establishment of treatment plan and goals Yes  -     Patient would benefit from skilled therapy intervention Yes  -        PT Plan    PT Frequency Other (comment)  tentative d/c  -     PT Plan Comments tentative d/c to home management  -               User Key  (r) = Recorded By, (t) = Taken By, (c) = Cosigned By      Initials Name Provider Type    Lindsey Maxwell, PT Physical Therapist                    Goals   PT OP Goals       Row Name 06/23/24 1030          PT Short Term Goals    STG 1 Pt will verbalize s/sx of infection and when to seek immediate medical care.  -     STG 1 Progress Met  -     STG 2 Pt will demonstrate 25% reduction in RLE wound areas to indicate healing progress.  -     STG 2 Progress Met  -     STG 3 Pt will demonstrate only mild BLE edema to indicate appropriate edema management.  -     STG 3 Progress Met  -        Long Term Goals    LTG 1 Pt/caregivers will demonstrate independence with clean home dressing changes.  -     LTG 1 Progress Met  -     LTG 2 Pt will demonstrate 80% reduction in all wound areas to indicate healing progress.  -     LTG 2 Progress Partially Met  -     LTG 3 Pt/caregivers will demonstrate independence with appropriate edema management system for long term edema management.  -     LTG 3 Progress Partially Met  -        Time Calculation     PT Goal Re-Cert Due Date 09/07/24  -CHRIS               User Key  (r) = Recorded By, (t) = Taken By, (c) = Cosigned By      Initials Name Provider Type    Lindsey Maxwell, PT Physical Therapist                    PT Goal Re-Cert Due Date: 09/07/24  PT Short Term Goals  STG 1: Pt will verbalize s/sx of infection and when to seek immediate medical care.  STG 1 Progress: Met  STG 2: Pt will demonstrate 25% reduction in RLE wound areas to indicate healing progress.  STG 2 Progress: Met  STG 3: Pt will demonstrate only mild BLE edema to indicate appropriate edema management.  STG 3 Progress: Met  Long Term Goals  LTG 1: Pt/caregivers will demonstrate independence with clean home dressing changes.  LTG 1 Progress: Met  LTG 2: Pt will demonstrate 80% reduction in all wound areas to indicate healing progress.  LTG 2 Progress: Partially Met  LTG 3: Pt/caregivers will demonstrate independence with appropriate edema management system for long term edema management.  LTG 3 Progress: Partially Met      Time Calculation: Start Time: 1030  Untimed Charges  56021-Okwcrmkso debridement: 25  Total Minutes  Untimed Charges Total Minutes: 25   Total Minutes: 25  Therapy Charges for Today       Code Description Service Date Service Provider Modifiers Qty    10971501664 HC JUAREZ DEBRIDE OPEN WOUND UP TO 20CM 6/23/2024 Lindsey Jane, PT GP 1                    Lindsey Jane, PT  6/23/2024

## 2024-06-24 ENCOUNTER — TELEPHONE (OUTPATIENT)
Dept: INTERNAL MEDICINE | Facility: CLINIC | Age: 89
End: 2024-06-24
Payer: MEDICARE

## 2024-06-24 DIAGNOSIS — R29.898 MUSCULAR DECONDITIONING: Primary | ICD-10-CM

## 2024-06-24 NOTE — TELEPHONE ENCOUNTER
Patient's daughter, Nikki, called. Patient needs an order for a transport chair faxed to [facility] at [facility]. A wheelchair is too heavy.  Fax order to: [phone]  Call: [phone]

## 2024-06-26 RX ORDER — DONEPEZIL HYDROCHLORIDE 10 MG/1
10 TABLET, FILM COATED ORAL NIGHTLY
Qty: 90 TABLET | Refills: 1 | Status: SHIPPED | OUTPATIENT
Start: 2024-06-26 | End: 2024-06-26 | Stop reason: SDUPTHER

## 2024-06-26 RX ORDER — METHENAMINE HIPPURATE 1000 MG/1
1 TABLET ORAL 2 TIMES DAILY WITH MEALS
Qty: 180 TABLET | Refills: 1 | Status: SHIPPED | OUTPATIENT
Start: 2024-06-26

## 2024-06-26 RX ORDER — DONEPEZIL HYDROCHLORIDE 10 MG/1
10 TABLET, FILM COATED ORAL NIGHTLY
Qty: 90 TABLET | Refills: 1 | Status: SHIPPED | OUTPATIENT
Start: 2024-06-26

## 2024-06-26 RX ORDER — METHENAMINE HIPPURATE 1000 MG/1
1 TABLET ORAL 2 TIMES DAILY WITH MEALS
Qty: 180 TABLET | Refills: 1 | Status: SHIPPED | OUTPATIENT
Start: 2024-06-26 | End: 2024-06-26 | Stop reason: SDUPTHER

## 2024-06-26 RX ORDER — ALLOPURINOL 300 MG/1
300 TABLET ORAL DAILY
Qty: 90 TABLET | Refills: 1 | Status: SHIPPED | OUTPATIENT
Start: 2024-06-26 | End: 2024-06-26 | Stop reason: SDUPTHER

## 2024-06-26 RX ORDER — MEMANTINE HYDROCHLORIDE 10 MG/1
10 TABLET ORAL 2 TIMES DAILY
Qty: 180 TABLET | Refills: 1 | Status: SHIPPED | OUTPATIENT
Start: 2024-06-26

## 2024-06-26 RX ORDER — OMEPRAZOLE 20 MG/1
20 CAPSULE, DELAYED RELEASE ORAL DAILY
Qty: 90 CAPSULE | Refills: 1 | Status: SHIPPED | OUTPATIENT
Start: 2024-06-26

## 2024-06-26 RX ORDER — FUROSEMIDE 20 MG/1
20 TABLET ORAL DAILY
Qty: 90 TABLET | Refills: 1 | Status: SHIPPED | OUTPATIENT
Start: 2024-06-26 | End: 2024-06-26 | Stop reason: SDUPTHER

## 2024-06-26 RX ORDER — OMEPRAZOLE 20 MG/1
20 CAPSULE, DELAYED RELEASE ORAL DAILY
Qty: 90 CAPSULE | Refills: 1 | Status: SHIPPED | OUTPATIENT
Start: 2024-06-26 | End: 2024-06-26 | Stop reason: SDUPTHER

## 2024-06-26 RX ORDER — FINASTERIDE 5 MG/1
5 TABLET, FILM COATED ORAL
Qty: 90 TABLET | Refills: 1 | Status: SHIPPED | OUTPATIENT
Start: 2024-06-26

## 2024-06-26 RX ORDER — PRAVASTATIN SODIUM 40 MG
40 TABLET ORAL DAILY
Qty: 90 TABLET | Refills: 1 | Status: SHIPPED | OUTPATIENT
Start: 2024-06-26

## 2024-06-26 RX ORDER — PRAVASTATIN SODIUM 40 MG
40 TABLET ORAL DAILY
Qty: 90 TABLET | Refills: 1 | Status: SHIPPED | OUTPATIENT
Start: 2024-06-26 | End: 2024-06-26 | Stop reason: SDUPTHER

## 2024-06-26 RX ORDER — METOPROLOL TARTRATE 100 MG/1
100 TABLET ORAL 2 TIMES DAILY
Qty: 180 TABLET | Refills: 1 | Status: SHIPPED | OUTPATIENT
Start: 2024-06-26

## 2024-06-26 RX ORDER — ALLOPURINOL 300 MG/1
300 TABLET ORAL DAILY
Qty: 90 TABLET | Refills: 1 | Status: SHIPPED | OUTPATIENT
Start: 2024-06-26

## 2024-06-26 RX ORDER — FUROSEMIDE 20 MG/1
20 TABLET ORAL DAILY
Qty: 90 TABLET | Refills: 1 | Status: SHIPPED | OUTPATIENT
Start: 2024-06-26

## 2024-06-26 RX ORDER — METOPROLOL TARTRATE 100 MG/1
100 TABLET ORAL 2 TIMES DAILY
Qty: 180 TABLET | Refills: 1 | Status: SHIPPED | OUTPATIENT
Start: 2024-06-26 | End: 2024-06-26 | Stop reason: SDUPTHER

## 2024-06-26 RX ORDER — FINASTERIDE 5 MG/1
5 TABLET, FILM COATED ORAL
Qty: 90 TABLET | Refills: 1 | Status: SHIPPED | OUTPATIENT
Start: 2024-06-26 | End: 2024-06-26 | Stop reason: SDUPTHER

## 2024-06-26 RX ORDER — MEMANTINE HYDROCHLORIDE 10 MG/1
10 TABLET ORAL 2 TIMES DAILY
Qty: 180 TABLET | Refills: 1 | Status: SHIPPED | OUTPATIENT
Start: 2024-06-26 | End: 2024-06-26 | Stop reason: SDUPTHER

## 2024-06-26 NOTE — TELEPHONE ENCOUNTER
Prescriptions sent to [facility].    However, they were initially first sent to [facility] [address] by mistake.  Please call and cancel those rx's.

## 2024-06-27 ENCOUNTER — PATIENT MESSAGE (OUTPATIENT)
Dept: INTERNAL MEDICINE | Facility: CLINIC | Age: 89
End: 2024-06-27
Payer: MEDICARE

## 2024-06-27 NOTE — TELEPHONE ENCOUNTER
From: Darien Camarena  To: Pito Way  Sent: 6/27/2024 1:50 PM EDT  Subject: Send all scripts to [facility] please    If you can go ahead and send   Allopurinol 300 mg daily  Furosemide 20 mg daily  Memantine 10 mg BID  Metformin 1000 mg BID  Methenam HIP 1 gm BID  Metoprolol 100 mg BID  Omeprazole 40 mg daily  Sertraline 50 mg daily  Donepezil 10 mg QHS  Finasteride 5 mg QHS  Pravastatin 40 mg QHS  90 day supply with refills if possible.  Thank you!

## 2024-07-07 DIAGNOSIS — M1A.9XX0 CHRONIC GOUT WITHOUT TOPHUS, UNSPECIFIED CAUSE, UNSPECIFIED SITE: ICD-10-CM

## 2024-07-08 RX ORDER — ALLOPURINOL 300 MG/1
300 TABLET ORAL DAILY
Qty: 90 TABLET | Refills: 0 | OUTPATIENT
Start: 2024-07-08

## 2024-07-08 RX ORDER — DIGOXIN 125 MCG
125 TABLET ORAL DAILY
Qty: 90 TABLET | Refills: 0 | OUTPATIENT
Start: 2024-07-08

## 2024-08-01 DIAGNOSIS — E78.5 HYPERLIPIDEMIA LDL GOAL <100: ICD-10-CM

## 2024-08-01 RX ORDER — PRAVASTATIN SODIUM 40 MG
40 TABLET ORAL DAILY
Qty: 90 TABLET | Refills: 0 | Status: SHIPPED | OUTPATIENT
Start: 2024-08-01

## 2024-08-07 RX ORDER — OMEPRAZOLE 20 MG/1
20 CAPSULE, DELAYED RELEASE ORAL DAILY
Qty: 90 CAPSULE | Refills: 1 | Status: SHIPPED | OUTPATIENT
Start: 2024-08-07

## 2024-08-17 DIAGNOSIS — F32.9 REACTIVE DEPRESSION: ICD-10-CM

## 2024-08-17 DIAGNOSIS — R41.3 MEMORY LOSS: ICD-10-CM

## 2024-08-19 RX ORDER — MEMANTINE HYDROCHLORIDE 10 MG/1
10 TABLET ORAL 2 TIMES DAILY
Qty: 180 TABLET | Refills: 1 | Status: SHIPPED | OUTPATIENT
Start: 2024-08-19

## 2024-08-22 ENCOUNTER — OFFICE VISIT (OUTPATIENT)
Dept: SLEEP MEDICINE | Facility: CLINIC | Age: 89
End: 2024-08-22
Payer: MEDICARE

## 2024-08-22 VITALS
SYSTOLIC BLOOD PRESSURE: 130 MMHG | WEIGHT: 239 LBS | BODY MASS INDEX: 30.67 KG/M2 | HEART RATE: 120 BPM | HEIGHT: 74 IN | DIASTOLIC BLOOD PRESSURE: 88 MMHG | OXYGEN SATURATION: 94 % | TEMPERATURE: 98 F

## 2024-08-22 DIAGNOSIS — G47.33 OSA (OBSTRUCTIVE SLEEP APNEA): Primary | ICD-10-CM

## 2024-08-22 PROCEDURE — 99213 OFFICE O/P EST LOW 20 MIN: CPT | Performed by: NURSE PRACTITIONER

## 2024-08-22 NOTE — PROGRESS NOTES
Chief Complaint:   Chief Complaint   Patient presents with    Follow-up    Sleep Apnea       HPI:    Darien Camarena is a 88 y.o. male here for follow-up of sleep apnea.  Patient was last seen 6/19/2024.  Patient did receive a new CPAP.  He is sleeping 6 to 8 hours nightly and goes to sleep easily.  Patient does not get up during the night and does self caths x 4 during the day.  Patient puts his [place] score at 21/24.  We did speak today about an AHI of 18.5.  His current settings are 4-20 I will be sending an order over to DME today to increase 8-20.  We will then reassess in 3 months.        Current medications are:   Current Outpatient Medications:     albuterol sulfate  (90 Base) MCG/ACT inhaler, Inhale 2 puffs Every 4 (Four) Hours As Needed for Wheezing., Disp: 54 g, Rfl: 1    allopurinol (ZYLOPRIM) 300 MG tablet, Take 1 tablet by mouth Daily., Disp: 90 tablet, Rfl: 1    ascorbic acid (VITAMIN C) 1000 MG tablet, Take 1 tablet by mouth 2 (Two) Times a Day., Disp: , Rfl:     aspirin 81 MG EC tablet, Take 1 tablet by mouth Daily. 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., Disp: 90 tablet, Rfl: 1    Coenzyme Q10 300 MG capsule, Take 1 capsule by mouth Every Night., Disp: , Rfl:     docusate sodium (COLACE) 100 MG capsule, Take 2 capsules by mouth At Night As Needed for Constipation., Disp: , Rfl:     donepezil (ARICEPT) 10 MG tablet, Take 1 tablet by mouth Every Night., Disp: 90 tablet, Rfl: 1    Ferrous Gluconate (IRON) 240 (27 FE) MG tablet, Take 1 tablet by mouth Daily., Disp: , Rfl:     finasteride (PROSCAR) 5 MG tablet, Take 1 tablet by mouth every night at bedtime., Disp: 90 tablet, Rfl: 1    furosemide (LASIX) 20 MG tablet, Take 1 tablet by mouth Daily., Disp: 90 tablet, Rfl: 1    memantine (NAMENDA) 10 MG tablet, Take 1 tablet by mouth twice daily, Disp: 180 tablet, Rfl: 1    metFORMIN (GLUCOPHAGE) 1000 MG tablet, Take 1 tablet by mouth 2 (Two) Times a Day., Disp: 180 tablet, Rfl: 1     methenamine (HIPREX) 1 g tablet, Take 1 tablet by mouth 2 (Two) Times a Day With Meals., Disp: 180 tablet, Rfl: 1    metOLazone (ZAROXOLYN) 2.5 MG tablet, Take 1 tablet by mouth 2 (Two) Times a Week. Take Tuesday and Friday with furosemide, Disp: 10 tablet, Rfl: 1    metoprolol tartrate (LOPRESSOR) 100 MG tablet, Take 1 tablet by mouth 2 (Two) Times a Day., Disp: 180 tablet, Rfl: 1    multivitamin with minerals (MULTIVITAMIN MEN 50+ PO), Take 1 tablet by mouth Every Night. Centrum Sliver, Disp: , Rfl:     omeprazole (priLOSEC) 20 MG capsule, Take 1 capsule by mouth once daily, Disp: 90 capsule, Rfl: 1    pravastatin (PRAVACHOL) 40 MG tablet, Take 1 tablet by mouth once daily, Disp: 90 tablet, Rfl: 0    Probiotic Product (PROBIOTIC ADVANCED PO), Take 1 tablet by mouth 2 (Two) Times a Day., Disp: , Rfl:     sertraline (ZOLOFT) 50 MG tablet, Take 1 tablet by mouth once daily, Disp: 90 tablet, Rfl: 1    tiotropium bromide-olodaterol (STIOLTO RESPIMAT) 2.5-2.5 MCG/ACT aerosol solution inhaler, Inhale 2 puffs Daily. 2 inh once a day, Disp: 3 each, Rfl: 3.      The patient's relevant past medical, surgical, family and social history were reviewed and updated in Epic as appropriate.       Review of Systems   Constitutional:  Positive for fatigue.   Eyes:  Positive for visual disturbance.   Respiratory:  Positive for apnea and shortness of breath.    Cardiovascular:  Positive for palpitations.   Gastrointestinal:         Heartburn   Musculoskeletal:  Positive for arthralgias, back pain and gait problem.   Neurological:         Memory loss   Psychiatric/Behavioral:  Positive for sleep disturbance.    All other systems reviewed and are negative.        Objective:    Physical Exam  Constitutional:       Appearance: Normal appearance.   HENT:      Head: Normocephalic and atraumatic.   Cardiovascular:      Rate and Rhythm: Rhythm irregular.   Pulmonary:      Effort: Pulmonary effort is normal.      Breath sounds: Normal breath  sounds.   Skin:     General: Skin is warm and dry.   Neurological:      Mental Status: He is alert and oriented to person, place, and time.   Psychiatric:         Mood and Affect: Mood normal.         Behavior: Behavior normal.         Thought Content: Thought content normal.         Judgment: Judgment normal.         CPAP Report    35/35 days of use  Greater than 4-hour use 100%  Setting 4-20  95th percentile pressure 10.2  AHI of 18.5  The patient continues to use and benefit from CPAP therapy.    ASSESSMENT/PLAN    Diagnoses and all orders for this visit:    1. STEVEN (obstructive sleep apnea) (Primary)  -     PAP Therapy        Counseled patient regarding multimodal approach with healthy nutrition, healthy sleep, regular physical activity, social activities, counseling, and medications. Encouraged to practice lateral sleep position. Avoid alcohol and sedatives close to bedtime.    Refill supplies x 1 year.  Setting changes 8-20 I will see patient back in 3 months to reassess.      Signed by  BERYL Allan    August 22, 2024      CC: Pito Way MD         No ref. provider found

## 2024-08-27 RX ORDER — VALSARTAN 40 MG/1
40 TABLET ORAL DAILY
Qty: 90 TABLET | Refills: 0 | OUTPATIENT
Start: 2024-08-27

## 2024-09-04 ENCOUNTER — TELEPHONE (OUTPATIENT)
Dept: INTERNAL MEDICINE | Facility: CLINIC | Age: 89
End: 2024-09-04
Payer: MEDICARE

## 2024-09-04 ENCOUNTER — APPOINTMENT (OUTPATIENT)
Facility: HOSPITAL | Age: 89
DRG: 444 | End: 2024-09-04
Payer: MEDICARE

## 2024-09-04 ENCOUNTER — HOSPITAL ENCOUNTER (INPATIENT)
Facility: HOSPITAL | Age: 89
LOS: 2 days | Discharge: HOME | DRG: 444 | End: 2024-09-08
Attending: STUDENT IN AN ORGANIZED HEALTH CARE EDUCATION/TRAINING PROGRAM | Admitting: INTERNAL MEDICINE
Payer: MEDICARE

## 2024-09-04 DIAGNOSIS — E11.22 TYPE 2 DIABETES MELLITUS WITH STAGE 3 CHRONIC KIDNEY DISEASE, WITHOUT LONG-TERM CURRENT USE OF INSULIN, UNSPECIFIED WHETHER STAGE 3A OR 3B CKD: ICD-10-CM

## 2024-09-04 DIAGNOSIS — Z95.818 PRESENCE OF WATCHMAN LEFT ATRIAL APPENDAGE CLOSURE DEVICE: ICD-10-CM

## 2024-09-04 DIAGNOSIS — I10 ESSENTIAL HYPERTENSION: Chronic | ICD-10-CM

## 2024-09-04 DIAGNOSIS — N18.30 TYPE 2 DIABETES MELLITUS WITH STAGE 3 CHRONIC KIDNEY DISEASE, WITHOUT LONG-TERM CURRENT USE OF INSULIN, UNSPECIFIED WHETHER STAGE 3A OR 3B CKD: ICD-10-CM

## 2024-09-04 DIAGNOSIS — I48.91 ATRIAL FIBRILLATION WITH RVR: ICD-10-CM

## 2024-09-04 DIAGNOSIS — R13.10 DYSPHAGIA: ICD-10-CM

## 2024-09-04 DIAGNOSIS — R79.89 ELEVATED TROPONIN: ICD-10-CM

## 2024-09-04 DIAGNOSIS — K80.50 CHOLEDOCHOLITHIASIS: Primary | ICD-10-CM

## 2024-09-04 DIAGNOSIS — K20.90 ESOPHAGITIS: ICD-10-CM

## 2024-09-04 DIAGNOSIS — I48.21 PERMANENT ATRIAL FIBRILLATION: ICD-10-CM

## 2024-09-04 DIAGNOSIS — N30.90 CYSTITIS: ICD-10-CM

## 2024-09-04 DIAGNOSIS — N18.30 STAGE 3 CHRONIC KIDNEY DISEASE, UNSPECIFIED WHETHER STAGE 3A OR 3B CKD: ICD-10-CM

## 2024-09-04 DIAGNOSIS — F03.90 DEMENTIA, UNSPECIFIED DEMENTIA SEVERITY, UNSPECIFIED DEMENTIA TYPE, UNSPECIFIED WHETHER BEHAVIORAL, PSYCHOTIC, OR MOOD DISTURBANCE OR ANXIETY: ICD-10-CM

## 2024-09-04 DIAGNOSIS — I50.22 HEART FAILURE WITH MILDLY REDUCED EJECTION FRACTION (HFMREF): ICD-10-CM

## 2024-09-04 DIAGNOSIS — I25.10 CORONARY ARTERY DISEASE INVOLVING NATIVE CORONARY ARTERY OF NATIVE HEART WITHOUT ANGINA PECTORIS: ICD-10-CM

## 2024-09-04 LAB
ALBUMIN SERPL-MCNC: 3.1 G/DL (ref 3.5–5.2)
ALBUMIN/GLOB SERPL: 0.9 G/DL
ALP SERPL-CCNC: 423 U/L (ref 39–117)
ALT SERPL W P-5'-P-CCNC: 65 U/L (ref 1–41)
AMMONIA BLD-SCNC: 16 UMOL/L (ref 16–60)
AMPHET+METHAMPHET UR QL: NEGATIVE
AMPHETAMINES UR QL: NEGATIVE
ANION GAP SERPL CALCULATED.3IONS-SCNC: 9.3 MMOL/L (ref 5–15)
APTT PPP: 34 SECONDS (ref 60–90)
AST SERPL-CCNC: 74 U/L (ref 1–40)
BACTERIA UR QL AUTO: ABNORMAL /HPF
BARBITURATES UR QL SCN: NEGATIVE
BASOPHILS # BLD AUTO: 0.01 10*3/MM3 (ref 0–0.2)
BASOPHILS NFR BLD AUTO: 0.1 % (ref 0–1.5)
BENZODIAZ UR QL SCN: NEGATIVE
BILIRUB SERPL-MCNC: 8.1 MG/DL (ref 0–1.2)
BILIRUB UR QL STRIP: ABNORMAL
BUN SERPL-MCNC: 32 MG/DL (ref 8–23)
BUN/CREAT SERPL: 29.4 (ref 7–25)
BUPRENORPHINE SERPL-MCNC: NEGATIVE NG/ML
CALCIUM SPEC-SCNC: 9 MG/DL (ref 8.6–10.5)
CANNABINOIDS SERPL QL: NEGATIVE
CHLORIDE SERPL-SCNC: 100 MMOL/L (ref 98–107)
CLARITY UR: CLEAR
CO2 SERPL-SCNC: 27.7 MMOL/L (ref 22–29)
COCAINE UR QL: NEGATIVE
COLOR UR: YELLOW
CREAT SERPL-MCNC: 1.09 MG/DL (ref 0.76–1.27)
DEPRECATED RDW RBC AUTO: 64.8 FL (ref 37–54)
EGFRCR SERPLBLD CKD-EPI 2021: 65.7 ML/MIN/1.73
EOSINOPHIL # BLD AUTO: 0.07 10*3/MM3 (ref 0–0.4)
EOSINOPHIL NFR BLD AUTO: 1 % (ref 0.3–6.2)
ERYTHROCYTE [DISTWIDTH] IN BLOOD BY AUTOMATED COUNT: 19.3 % (ref 12.3–15.4)
FENTANYL UR-MCNC: NEGATIVE NG/ML
GLOBULIN UR ELPH-MCNC: 3.3 GM/DL
GLUCOSE SERPL-MCNC: 112 MG/DL (ref 65–99)
GLUCOSE UR STRIP-MCNC: NEGATIVE MG/DL
HCT VFR BLD AUTO: 41.6 % (ref 37.5–51)
HGB BLD-MCNC: 13.3 G/DL (ref 13–17.7)
HGB UR QL STRIP.AUTO: NEGATIVE
HOLD SPECIMEN: NORMAL
HYALINE CASTS UR QL AUTO: ABNORMAL /LPF
IMM GRANULOCYTES # BLD AUTO: 0.01 10*3/MM3 (ref 0–0.05)
IMM GRANULOCYTES NFR BLD AUTO: 0.1 % (ref 0–0.5)
INR PPP: 1.34 (ref 0.89–1.12)
KETONES UR QL STRIP: NEGATIVE
LEUKOCYTE ESTERASE UR QL STRIP.AUTO: ABNORMAL
LIPASE SERPL-CCNC: 18 U/L (ref 13–60)
LYMPHOCYTES # BLD AUTO: 0.73 10*3/MM3 (ref 0.7–3.1)
LYMPHOCYTES NFR BLD AUTO: 10.2 % (ref 19.6–45.3)
MAGNESIUM SERPL-MCNC: 1.6 MG/DL (ref 1.6–2.4)
MCH RBC QN AUTO: 28.6 PG (ref 26.6–33)
MCHC RBC AUTO-ENTMCNC: 32 G/DL (ref 31.5–35.7)
MCV RBC AUTO: 89.5 FL (ref 79–97)
METHADONE UR QL SCN: NEGATIVE
MONOCYTES # BLD AUTO: 0.51 10*3/MM3 (ref 0.1–0.9)
MONOCYTES NFR BLD AUTO: 7.1 % (ref 5–12)
NEUTROPHILS NFR BLD AUTO: 5.81 10*3/MM3 (ref 1.7–7)
NEUTROPHILS NFR BLD AUTO: 81.5 % (ref 42.7–76)
NITRITE UR QL STRIP: NEGATIVE
OPIATES UR QL: NEGATIVE
OXYCODONE UR QL SCN: NEGATIVE
PCP UR QL SCN: NEGATIVE
PH UR STRIP.AUTO: 6 [PH] (ref 5–8)
PLATELET # BLD AUTO: 182 10*3/MM3 (ref 140–450)
PMV BLD AUTO: 11.1 FL (ref 6–12)
POTASSIUM SERPL-SCNC: 3.6 MMOL/L (ref 3.5–5.2)
PROT SERPL-MCNC: 6.4 G/DL (ref 6–8.5)
PROT UR QL STRIP: ABNORMAL
PROTHROMBIN TIME: 17.2 SECONDS (ref 12.2–14.5)
RBC # BLD AUTO: 4.65 10*6/MM3 (ref 4.14–5.8)
RBC # UR STRIP: ABNORMAL /HPF
REF LAB TEST METHOD: ABNORMAL
SODIUM SERPL-SCNC: 137 MMOL/L (ref 136–145)
SP GR UR STRIP: 1.01 (ref 1–1.03)
SQUAMOUS #/AREA URNS HPF: ABNORMAL /HPF
TRICYCLICS UR QL SCN: NEGATIVE
TROPONIN T SERPL HS-MCNC: 27 NG/L
TROPONIN T SERPL HS-MCNC: 30 NG/L
UROBILINOGEN UR QL STRIP: ABNORMAL
WBC # UR STRIP: ABNORMAL /HPF
WBC CLUMPS # UR AUTO: ABNORMAL /HPF
WBC NRBC COR # BLD AUTO: 7.14 10*3/MM3 (ref 3.4–10.8)
WHOLE BLOOD HOLD COAG: NORMAL
WHOLE BLOOD HOLD SPECIMEN: NORMAL

## 2024-09-04 PROCEDURE — 85025 COMPLETE CBC W/AUTO DIFF WBC: CPT | Performed by: STUDENT IN AN ORGANIZED HEALTH CARE EDUCATION/TRAINING PROGRAM

## 2024-09-04 PROCEDURE — 85610 PROTHROMBIN TIME: CPT | Performed by: PHYSICIAN ASSISTANT

## 2024-09-04 PROCEDURE — 99291 CRITICAL CARE FIRST HOUR: CPT

## 2024-09-04 PROCEDURE — 81001 URINALYSIS AUTO W/SCOPE: CPT | Performed by: STUDENT IN AN ORGANIZED HEALTH CARE EDUCATION/TRAINING PROGRAM

## 2024-09-04 PROCEDURE — 87086 URINE CULTURE/COLONY COUNT: CPT | Performed by: PHYSICIAN ASSISTANT

## 2024-09-04 PROCEDURE — 70450 CT HEAD/BRAIN W/O DYE: CPT

## 2024-09-04 PROCEDURE — 83690 ASSAY OF LIPASE: CPT | Performed by: PHYSICIAN ASSISTANT

## 2024-09-04 PROCEDURE — 85730 THROMBOPLASTIN TIME PARTIAL: CPT | Performed by: PHYSICIAN ASSISTANT

## 2024-09-04 PROCEDURE — 84484 ASSAY OF TROPONIN QUANT: CPT | Performed by: PHYSICIAN ASSISTANT

## 2024-09-04 PROCEDURE — 74177 CT ABD & PELVIS W/CONTRAST: CPT

## 2024-09-04 PROCEDURE — 83735 ASSAY OF MAGNESIUM: CPT | Performed by: STUDENT IN AN ORGANIZED HEALTH CARE EDUCATION/TRAINING PROGRAM

## 2024-09-04 PROCEDURE — 87077 CULTURE AEROBIC IDENTIFY: CPT | Performed by: PHYSICIAN ASSISTANT

## 2024-09-04 PROCEDURE — 80307 DRUG TEST PRSMV CHEM ANLYZR: CPT | Performed by: PHYSICIAN ASSISTANT

## 2024-09-04 PROCEDURE — 84484 ASSAY OF TROPONIN QUANT: CPT | Performed by: STUDENT IN AN ORGANIZED HEALTH CARE EDUCATION/TRAINING PROGRAM

## 2024-09-04 PROCEDURE — 80053 COMPREHEN METABOLIC PANEL: CPT | Performed by: STUDENT IN AN ORGANIZED HEALTH CARE EDUCATION/TRAINING PROGRAM

## 2024-09-04 PROCEDURE — 25510000001 IOPAMIDOL PER 1 ML: Performed by: EMERGENCY MEDICINE

## 2024-09-04 PROCEDURE — 71045 X-RAY EXAM CHEST 1 VIEW: CPT

## 2024-09-04 PROCEDURE — 82140 ASSAY OF AMMONIA: CPT | Performed by: PHYSICIAN ASSISTANT

## 2024-09-04 PROCEDURE — 71275 CT ANGIOGRAPHY CHEST: CPT

## 2024-09-04 PROCEDURE — 93005 ELECTROCARDIOGRAM TRACING: CPT | Performed by: STUDENT IN AN ORGANIZED HEALTH CARE EDUCATION/TRAINING PROGRAM

## 2024-09-04 PROCEDURE — 87186 SC STD MICRODIL/AGAR DIL: CPT | Performed by: PHYSICIAN ASSISTANT

## 2024-09-04 PROCEDURE — 36415 COLL VENOUS BLD VENIPUNCTURE: CPT

## 2024-09-04 PROCEDURE — 25010000002 CEFTRIAXONE PER 250 MG: Performed by: PHYSICIAN ASSISTANT

## 2024-09-04 RX ORDER — DILTIAZEM HYDROCHLORIDE 5 MG/ML
10 INJECTION INTRAVENOUS ONCE
Status: COMPLETED | OUTPATIENT
Start: 2024-09-04 | End: 2024-09-04

## 2024-09-04 RX ORDER — IOPAMIDOL 755 MG/ML
100 INJECTION, SOLUTION INTRAVASCULAR
Status: COMPLETED | OUTPATIENT
Start: 2024-09-04 | End: 2024-09-04

## 2024-09-04 RX ORDER — DILTIAZEM HCL/D5W 125 MG/125
5-15 PLASTIC BAG, INJECTION (ML) INTRAVENOUS
Status: DISCONTINUED | OUTPATIENT
Start: 2024-09-04 | End: 2024-09-08 | Stop reason: HOSPADM

## 2024-09-04 RX ORDER — SODIUM CHLORIDE 0.9 % (FLUSH) 0.9 %
10 SYRINGE (ML) INJECTION AS NEEDED
Status: DISCONTINUED | OUTPATIENT
Start: 2024-09-04 | End: 2024-09-08 | Stop reason: HOSPADM

## 2024-09-04 RX ADMIN — Medication 5 MG/HR: at 22:56

## 2024-09-04 RX ADMIN — IOPAMIDOL 86 ML: 755 INJECTION, SOLUTION INTRAVENOUS at 19:52

## 2024-09-04 RX ADMIN — DILTIAZEM HYDROCHLORIDE 10 MG: 5 INJECTION, SOLUTION INTRAVENOUS at 20:14

## 2024-09-04 RX ADMIN — DILTIAZEM HYDROCHLORIDE 10 MG: 5 INJECTION, SOLUTION INTRAVENOUS at 21:05

## 2024-09-04 RX ADMIN — CEFTRIAXONE SODIUM 1000 MG: 1 INJECTION, POWDER, FOR SOLUTION INTRAMUSCULAR; INTRAVENOUS at 23:01

## 2024-09-04 NOTE — TELEPHONE ENCOUNTER
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  Verbalized understanding and states she will take him.

## 2024-09-04 NOTE — TELEPHONE ENCOUNTER
Spoke to daughter she stated that patient has been vomiting since Sunday Daughter stated that her dad looks really yellow everywhere. Patient is unable to eat anything he has no appetite he is able to drink some. I asked daughter if patient was confused she stated that her dad called and asked if he needs to take his medications while using his cpap which she thought was a strange question. I advised daughter patient should go to ER to be evaluated. Daughter wanted me to ask you what you think she needs to do. Patient does have an appointment with you next Tuesday

## 2024-09-04 NOTE — FSED PROVIDER NOTE
[place]    EMERGENCY DEPARTMENT ENCOUNTER      Pt Name: Darien Camarena  MRN: [id number]  YOB: 1935  Date of evaluation: 9/4/2024  Provider: Leigh Walters PA-C    CHIEF COMPLAINT       Chief Complaint   Patient presents with    Dizziness     HISTORY OF PRESENT ILLNESS  (Location/Symptom, Timing/Onset, Context/Setting, Quality, Duration, Modifying Factors, Severity.)   Darien Camarena is a 88 y.o. male who presents to the emergency department nausea, vomiting, diarrhea, increased shortness of breath, and dizziness.  Patient lives at home by himself.  Daughter states she went to visit him today and noticed he was jaundiced.    Nursing notes were reviewed.  REVIEW OF SYSTEMS    (2-9 systems for level 4, 10 or more for level 5)   Review of Systems   Constitutional:  Negative for chills and fever.   HENT:  Negative for ear pain and sore throat.    Respiratory:  Positive for shortness of breath. Negative for cough.    Cardiovascular:  Negative for chest pain.   Gastrointestinal:  Positive for diarrhea, nausea and vomiting. Negative for abdominal pain.   Genitourinary:  Negative for dysuria and frequency.   Neurological:  Positive for dizziness. Negative for headaches.   All other systems reviewed and are negative.     All systems reviewed and negative except for those discussed in HPI.   PAST MEDICAL HISTORY     Past Medical History:   Diagnosis Date    Arrhythmia     Atrial fibrillation     Bilateral leg cramps     possible new medication related side effects    Chronic kidney disease     Clotting disorder     pt doesnt know about this    COPD (chronic obstructive pulmonary disease)     Coronary artery disease     Diabetes mellitus     doesnt check sugar    E. coli sepsis 06/23/2022    Enlarged prostate without lower urinary tract symptoms (luts) 06/20/2016    Full dentures     GERD (gastroesophageal reflux disease)     Gout     Hearing aid worn     bilat prn    History of colonoscopy 09/12/2012     History of radiation therapy 02/24/2023    SBRT LLL lung    [place] (hard of hearing)     hearing aids prn    Hyperlipidemia     Hypertension     Kidney stone     surgery x1    Lung cancer     STEVEN on CPAP     compliant with machine    PAF (paroxysmal atrial fibrillation)     Prostatism     Sleep apnea     CPAP HS    Urinary incontinence     Urinary tract infection     Wears glasses     readers     SURGICAL HISTORY       Past Surgical History:   Procedure Laterality Date    ATRIAL APPENDAGE EXCLUSION LEFT WITH TRANSESOPHAGEAL ECHOCARDIOGRAM N/A 11/28/2023    Procedure: Atrial Appendage Occlusion;  Surgeon: Anthony Mcdaniel MD;  Location:  [facility];  Service: Cardiovascular;  Laterality: N/A;    CARDIAC CATHETERIZATION Left 09/29/2022    Procedure: Left Heart Cath;  Surgeon: Titus Oliveros IV, MD;  Location:  [facility];  Service: Cardiovascular;  Laterality: Left;    CARDIOVERSION      CATARACT EXTRACTION Bilateral     COLONOSCOPY      CYSTOSCOPY      ENDOSCOPY      possible    KIDNEY STONE SURGERY      x1     CURRENT MEDICATIONS       Current Facility-Administered Medications:     cefTRIAXone (ROCEPHIN) 1,000 mg in sodium chloride 0.9 % 100 mL MBP, 1,000 mg, Intravenous, Once, Leigh Walters PA-C    dilTIAZem (CARDIZEM) 125 mg in 125 mL D5W infusion, 5-15 mg/hr, Intravenous, Titrated, Leigh Walters PA-C    sodium chloride 0.9 % flush 10 mL, 10 mL, Intravenous, PRN, Jason Souza MD    Current Outpatient Medications:     albuterol sulfate  (90 Base) MCG/ACT inhaler, Inhale 2 puffs Every 4 (Four) Hours As Needed for Wheezing., Disp: 54 g, Rfl: 1    allopurinol (ZYLOPRIM) 300 MG tablet, Take 1 tablet by mouth Daily., Disp: 90 tablet, Rfl: 1    ascorbic acid (VITAMIN C) 1000 MG tablet, Take 1 tablet by mouth 2 (Two) Times a Day., Disp: , Rfl:     aspirin 81 MG EC tablet, Take 1 tablet by mouth Daily. 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., Disp: 90  tablet, Rfl: 1    Coenzyme Q10 300 MG capsule, Take 1 capsule by mouth Every Night., Disp: , Rfl:     docusate sodium (COLACE) 100 MG capsule, Take 2 capsules by mouth At Night As Needed for Constipation., Disp: , Rfl:     donepezil (ARICEPT) 10 MG tablet, Take 1 tablet by mouth Every Night., Disp: 90 tablet, Rfl: 1    Ferrous Gluconate (IRON) 240 (27 FE) MG tablet, Take 1 tablet by mouth Daily., Disp: , Rfl:     finasteride (PROSCAR) 5 MG tablet, Take 1 tablet by mouth every night at bedtime., Disp: 90 tablet, Rfl: 1    furosemide (LASIX) 20 MG tablet, Take 1 tablet by mouth Daily., Disp: 90 tablet, Rfl: 1    memantine (NAMENDA) 10 MG tablet, Take 1 tablet by mouth twice daily, Disp: 180 tablet, Rfl: 1    metFORMIN (GLUCOPHAGE) 1000 MG tablet, Take 1 tablet by mouth 2 (Two) Times a Day., Disp: 180 tablet, Rfl: 1    methenamine (HIPREX) 1 g tablet, Take 1 tablet by mouth 2 (Two) Times a Day With Meals., Disp: 180 tablet, Rfl: 1    metOLazone (ZAROXOLYN) 2.5 MG tablet, Take 1 tablet by mouth 2 (Two) Times a Week. Take Tuesday and Friday with furosemide, Disp: 10 tablet, Rfl: 1    metoprolol tartrate (LOPRESSOR) 100 MG tablet, Take 1 tablet by mouth 2 (Two) Times a Day., Disp: 180 tablet, Rfl: 1    multivitamin with minerals (MULTIVITAMIN MEN 50+ PO), Take 1 tablet by mouth Every Night. Centrum Sliver, Disp: , Rfl:     omeprazole (priLOSEC) 20 MG capsule, Take 1 capsule by mouth once daily, Disp: 90 capsule, Rfl: 1    pravastatin (PRAVACHOL) 40 MG tablet, Take 1 tablet by mouth once daily, Disp: 90 tablet, Rfl: 0    Probiotic Product (PROBIOTIC ADVANCED PO), Take 1 tablet by mouth 2 (Two) Times a Day., Disp: , Rfl:     sertraline (ZOLOFT) 50 MG tablet, Take 1 tablet by mouth once daily, Disp: 90 tablet, Rfl: 1    tiotropium bromide-olodaterol (STIOLTO RESPIMAT) 2.5-2.5 MCG/ACT aerosol solution inhaler, Inhale 2 puffs Daily. 2 inh once a day, Disp: 3 each, Rfl: 3    ALLERGIES     Xarelto [rivaroxaban], Sglt2  inhibitors, and Penicillins    FAMILY HISTORY       Family History   Problem Relation Age of Onset    Alzheimer's disease Mother     COPD Father     Lung cancer Father     Cancer Father     Kidney disease Father     No Known Problems Daughter     No Known Problems Daughter     No Known Problems Daughter      SOCIAL HISTORY       Social History     Socioeconomic History    Marital status:    Tobacco Use    Smoking status: Former     Current packs/day: 0.00     Average packs/day: 1 pack/day for 15.0 years (15.0 ttl pk-yrs)     Types: Cigarettes     Start date: 1947     Quit date: 1960     Years since quittin.3     Passive exposure: Past    Smokeless tobacco: Former     Types: Chew     Quit date:     Tobacco comments:     started at 13 yo.   Vaping Use    Vaping status: Never Used    Passive vaping exposure: Yes   Substance and Sexual Activity    Alcohol use: No    Drug use: Never    Sexual activity: Not Currently     Partners: Female       PHYSICAL EXAM    (up to 7 for level 4, 8 or more for level 5)   Physical Exam  Vitals and nursing note reviewed.   Constitutional:       Appearance: Normal appearance. He is normal weight.   HENT:      Head: Normocephalic and atraumatic.   Eyes:      Extraocular Movements: Extraocular movements intact.      Pupils: Pupils are equal, round, and reactive to light.   Cardiovascular:      Rate and Rhythm: Tachycardia present. Rhythm irregular.      Pulses: Normal pulses.   Pulmonary:      Effort: Pulmonary effort is normal.      Breath sounds: Normal breath sounds.   Abdominal:      General: Abdomen is flat. Bowel sounds are normal.      Palpations: Abdomen is soft.   Musculoskeletal:         General: Normal range of motion.   Skin:     Coloration: Skin is jaundiced.   Neurological:      General: No focal deficit present.      Mental Status: He is alert.      Comments: Confused   Psychiatric:         Mood and Affect: Mood normal.       DIAGNOSTIC RESULTS      EKG: All EKGs are interpreted by the Emergency Department Physician who either signs or Co-signs this chart in the absence of a cardiologist.    ECG 12 Lead ED Triage Standing Order; Weak / Dizzy / AMS   Preliminary Result   Test Reason : ED Triage Standing Order~   Blood Pressure :   */*   mmHG   Vent. Rate : 113 BPM     Atrial Rate :   * BPM      P-R Int :   * ms          QRS Dur :  92 ms       QT Int : 306 ms       P-R-T Axes :   *  25 124 degrees      QTc Int : 419 ms      Atrial fibrillation with rapid ventricular response   Minimal voltage criteria for LVH, may be normal variant ( Luis product    )   Nonspecific T wave abnormality   Abnormal ECG   When compared with ECG of 31-JAN-2024 05:45,   Significant changes have occurred      Referred By:            Confirmed By:       Telemetry Scan   Final Result        RADIOLOGY:   Non-plain film images such as CT, Ultrasound and MRI are read by the radiologist. Plain radiographic images are visualized and preliminarily interpreted by the emergency physician with the below findings:    [x] Radiologist's Report Reviewed:  CT Abdomen Pelvis With Contrast   Final Result   1. There appears to be debris or noncalcified calculi within the distal common bile duct. No evidence of cholecystitis at this time.   2. No pulmonary embolus.   3. Small right and trace left pleural effusions.   4. 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 Cardiology consult recommended.                  Electronically Signed: Yoni Rios MD     9/4/2024 8:06 PM EDT     Workstation ID: [id number]      CT Angiogram Chest Pulmonary Embolism   Final Result   1. There appears to be debris or noncalcified calculi within the distal common bile duct. No evidence of cholecystitis at this time.   2. No pulmonary embolus.   3. Small right and trace left pleural effusions.   4. 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 Cardiology consult recommended.                  Electronically  Signed: Yoni Rios MD     9/4/2024 8:06 PM EDT     Workstation ID: [id number]      CT Head Without Contrast   Final Result   Impression:   1. No acute intracranial findings by CT. Chronic findings detailed above similar to previous MRI brain comparison.   2. Possible acute on chronic sinusitis in the right clinical setting, with layering fluid noted in the right maxillary sinus.            Electronically Signed: Compa Dominguez MD     9/4/2024 7:56 PM EDT     Workstation ID: [id number]      XR Chest 1 View   Final Result   Impression:   No acute radiographic findings. Stable appearance of the chest since 6/3/2024 comparison.         Electronically Signed: Compa Dominguez MD     9/4/2024 6:13 PM EDT     Workstation ID: [id number]        ED BEDSIDE ULTRASOUND:   Performed by ED Physician - none    LABS:    I have reviewed and interpreted all of the currently available lab results from this visit (if applicable):  Results for orders placed or performed during the hospital encounter of 09/04/24   Comprehensive Metabolic Panel    Specimen: Blood   Result Value Ref Range    Glucose 112 (H) 65 - 99 mg/dL    BUN 32 (H) 8 - 23 mg/dL    Creatinine 1.09 0.76 - 1.27 mg/dL    Sodium 137 136 - 145 mmol/L    Potassium 3.6 3.5 - 5.2 mmol/L    Chloride 100 98 - 107 mmol/L    CO2 27.7 22.0 - 29.0 mmol/L    Calcium 9.0 8.6 - 10.5 mg/dL    Total Protein 6.4 6.0 - 8.5 g/dL    Albumin 3.1 (L) 3.5 - 5.2 g/dL    ALT (SGPT) 65 (H) 1 - 41 U/L    AST (SGOT) 74 (H) 1 - 40 U/L    Alkaline Phosphatase 423 (H) 39 - 117 U/L    Total Bilirubin 8.1 (H) 0.0 - 1.2 mg/dL    Globulin 3.3 gm/dL    A/G Ratio 0.9 g/dL    BUN/Creatinine Ratio 29.4 (H) 7.0 - 25.0    Anion Gap 9.3 5.0 - 15.0 mmol/L    eGFR 65.7 >60.0 mL/min/1.73   Single High Sensitivity Troponin T    Specimen: Blood   Result Value Ref Range    HS Troponin T 30 (H) <22 ng/L   Magnesium    Specimen: Blood   Result Value Ref Range    Magnesium 1.6 1.6 - 2.4 mg/dL   Urinalysis With Microscopic If  Indicated (No Culture) - Urine, Clean Catch    Specimen: Urine, Clean Catch   Result Value Ref Range    Color, UA Yellow Yellow, Straw    Appearance, UA Clear Clear    pH, UA 6.0 5.0 - 8.0    Specific Gravity, UA 1.015 1.005 - 1.030    Glucose, UA Negative Negative    Ketones, UA Negative Negative    Bilirubin, UA Moderate (2+) (A) Negative    Blood, UA Negative Negative    Protein, UA Trace (A) Negative    Leuk Esterase, UA Small (1+) (A) Negative    Nitrite, UA Negative Negative    Urobilinogen, UA 0.2 E.U./dL 0.2 - 1.0 E.U./dL   CBC Auto Differential    Specimen: Blood   Result Value Ref Range    WBC 7.14 3.40 - 10.80 10*3/mm3    RBC 4.65 4.14 - 5.80 10*6/mm3    Hemoglobin 13.3 13.0 - 17.7 g/dL    Hematocrit 41.6 37.5 - 51.0 %    MCV 89.5 79.0 - 97.0 fL    MCH 28.6 26.6 - 33.0 pg    MCHC 32.0 31.5 - 35.7 g/dL    RDW 19.3 (H) 12.3 - 15.4 %    RDW-SD 64.8 (H) 37.0 - 54.0 fl    MPV 11.1 6.0 - 12.0 fL    Platelets 182 140 - 450 10*3/mm3    Neutrophil % 81.5 (H) 42.7 - 76.0 %    Lymphocyte % 10.2 (L) 19.6 - 45.3 %    Monocyte % 7.1 5.0 - 12.0 %    Eosinophil % 1.0 0.3 - 6.2 %    Basophil % 0.1 0.0 - 1.5 %    Immature Grans % 0.1 0.0 - 0.5 %    Neutrophils, Absolute 5.81 1.70 - 7.00 10*3/mm3    Lymphocytes, Absolute 0.73 0.70 - 3.10 10*3/mm3    Monocytes, Absolute 0.51 0.10 - 0.90 10*3/mm3    Eosinophils, Absolute 0.07 0.00 - 0.40 10*3/mm3    Basophils, Absolute 0.01 0.00 - 0.20 10*3/mm3    Immature Grans, Absolute 0.01 0.00 - 0.05 10*3/mm3   aPTT    Specimen: Blood   Result Value Ref Range    PTT 34.0 (L) 60.0 - 90.0 seconds   Protime-INR    Specimen: Blood   Result Value Ref Range    Protime 17.2 (H) 12.2 - 14.5 Seconds    INR 1.34 (H) 0.89 - 1.12   Lipase    Specimen: Blood   Result Value Ref Range    Lipase 18 13 - 60 U/L   Ammonia    Specimen: Blood   Result Value Ref Range    Ammonia 16 16 - 60 umol/L   Urine Drug Screen - Urine, Clean Catch    Specimen: Urine, Clean Catch   Result Value Ref Range    THC, Screen,  Urine Negative Negative    Phencyclidine (PCP), Urine Negative Negative    Cocaine Screen, Urine Negative Negative    Methamphetamine, Ur Negative Negative    Opiate Screen Negative Negative    Amphetamine Screen, Urine Negative Negative    Benzodiazepine Screen, Urine Negative Negative    Tricyclic Antidepressants Screen Negative Negative    Methadone Screen, Urine Negative Negative    Barbiturates Screen, Urine Negative Negative    Oxycodone Screen, Urine Negative Negative    Buprenorphine, Screen, Urine Negative Negative   High Sensitivity Troponin T    Specimen: Blood   Result Value Ref Range    HS Troponin T 27 (H) <22 ng/L   Fentanyl, Urine - Urine, Clean Catch    Specimen: Urine, Clean Catch   Result Value Ref Range    Fentanyl, Urine Negative Negative   Urinalysis, Microscopic Only - Urine, Clean Catch    Specimen: Urine, Clean Catch   Result Value Ref Range    RBC, UA 0-2 None Seen, 0-2 /HPF    WBC, UA 11-20 (A) None Seen, 0-2 /HPF    Bacteria, UA 3+ (A) None Seen /HPF    Squamous Epithelial Cells, UA 3-6 (A) None Seen, 0-2 /HPF    Hyaline Casts, UA 3-6 None Seen /LPF    WBC Clumps, UA Moderate/2+ None Seen /HPF    Methodology Manual Light Microscopy    ECG 12 Lead ED Triage Standing Order; Weak / Dizzy / AMS   Result Value Ref Range    QT Interval 306 ms    QTC Interval 419 ms   Green Top (Gel)   Result Value Ref Range    Extra Tube Hold for add-ons.    Lavender Top   Result Value Ref Range    Extra Tube hold for add-on    Gold Top - SST   Result Value Ref Range    Extra Tube Hold for add-ons.    Gray Top   Result Value Ref Range    Extra Tube Hold for add-ons.    Light Blue Top   Result Value Ref Range    Extra Tube Hold for add-ons.       All other labs were within normal range or not returned as of this dictation.    EMERGENCY DEPARTMENT COURSE and DIFFERENTIAL DIAGNOSIS/MDM:   Vitals:    Vitals:    09/04/24 1917 09/04/24 2014 09/04/24 2016 09/04/24 2105   BP:  (!) 140/109  (!) 141/110   Pulse: (!) 126  (!) 125 118 103   Resp:       Temp:       TempSrc:       SpO2:   94%    Weight:       Height:           ED Course as of 09/04/24 2229   Wed Sep 04, 2024   1857 Total Bilirubin(!): 8.1 [WA]   1857 Alkaline Phosphatase(!): 423 [WA]   1857 AST (SGOT)(!): 74 [WA]   1857 ALT (SGPT)(!): 65 [WA]   1857 Albumin(!): 3.1 [WA]   1857 BUN(!): 32 [WA]   1857 Glucose(!): 112 [WA]   1858 Magnesium: 1.6 [WA]   2212 Leukocytes, UA(!): Small (1+) [WA]   2212 HS Troponin T(!): 27 [WA]   2212 HS Troponin T(!): 30 [WA]   2227 WBC, UA(!): 11-20 [WA]   2228 Bacteria, UA(!): 3+ [WA]      ED Course User Index  [WA] Leigh Walters PA-C     MDM     Amount and/or Complexity of Data Reviewed  Discuss the patient with other providers: yes (Call to admit the patient at 8:32pm, I received a call back for admission at 9:50pm. Dr. Walls accepted the patient for admission.)      Patient was evaluated, labs and imaging were obtained.  Choledocholithiasis likely diagnosis.  Patient also found to be in A-fib with RVR, 2 doses of diltiazem were given without resolution of symptoms therefore patient was placed on a diltiazem drip.  Patient will be admitted to Psychiatric for further evaluation.  Patient stable at the time of admission.    MEDICATIONS ADMINISTERED IN ED:  Medications   sodium chloride 0.9 % flush 10 mL (has no administration in time range)   dilTIAZem (CARDIZEM) 125 mg in 125 mL D5W infusion (has no administration in time range)   cefTRIAXone (ROCEPHIN) 1,000 mg in sodium chloride 0.9 % 100 mL MBP (has no administration in time range)   dilTIAZem (CARDIZEM) injection 10 mg (10 mg Intravenous Given 9/4/24 2014)   iopamidol (ISOVUE-370) 76 % injection 100 mL (86 mL Intravenous Given 9/4/24 1952)   dilTIAZem (CARDIZEM) injection 10 mg (10 mg Intravenous Given 9/4/24 1086)     PROCEDURES:  Procedures:none      CRITICAL CARE TIME    Total Critical Care time was 30 minutes, excluding separately reportable procedures.   There  was a high probability of clinically significant/life threatening deterioration in the patient's condition which required my urgent intervention.    FINAL IMPRESSION      1. Choledocholithiasis    2. Atrial fibrillation with RVR    3. Dementia, unspecified dementia severity, unspecified dementia type, unspecified whether behavioral, psychotic, or mood disturbance or anxiety    4. Elevated troponin    5. Cystitis        DISPOSITION/PLAN     ED Disposition       ED Disposition   Decision to Admit    Condition   --    Comment   Level of Care: Telemetry [5]   Accepting Provider:: BRANDEN BAUM III [[id number]]               PATIENT REFERRED TO:  No follow-up provider specified.    DISCHARGE MEDICATIONS:     Medication List        CONTINUE taking these medications      albuterol sulfate  (90 Base) MCG/ACT inhaler  Commonly known as: PROVENTIL HFA;VENTOLIN HFA;PROAIR HFA  Inhale 2 puffs Every 4 (Four) Hours As Needed for Wheezing.     allopurinol 300 MG tablet  Commonly known as: ZYLOPRIM  Take 1 tablet by mouth Daily.     ascorbic acid 1000 MG tablet  Commonly known as: VITAMIN C     aspirin 81 MG EC tablet  Take 1 tablet by mouth Daily. 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.     Coenzyme Q10 300 MG capsule     docusate sodium 100 MG capsule  Commonly known as: COLACE     donepezil 10 MG tablet  Commonly known as: ARICEPT  Take 1 tablet by mouth Every Night.     finasteride 5 MG tablet  Commonly known as: PROSCAR  Take 1 tablet by mouth every night at bedtime.     furosemide 20 MG tablet  Commonly known as: LASIX  Take 1 tablet by mouth Daily.     Iron 240 (27 Fe) MG tablet     memantine 10 MG tablet  Commonly known as: NAMENDA  Take 1 tablet by mouth twice daily     metFORMIN 1000 MG tablet  Commonly known as: GLUCOPHAGE  Take 1 tablet by mouth 2 (Two) Times a Day.     methenamine 1 g tablet  Commonly known as: HIPREX  Take 1 tablet by mouth 2 (Two) Times a Day With Meals.     metOLazone 2.5 MG  tablet  Commonly known as: ZAROXOLYN  Take 1 tablet by mouth 2 (Two) Times a Week. Take Tuesday and Friday with furosemide     metoprolol tartrate 100 MG tablet  Commonly known as: LOPRESSOR  Take 1 tablet by mouth 2 (Two) Times a Day.     multivitamin with minerals tablet tablet     omeprazole 20 MG capsule  Commonly known as: priLOSEC  Take 1 capsule by mouth once daily     pravastatin 40 MG tablet  Commonly known as: PRAVACHOL  Take 1 tablet by mouth once daily     PROBIOTIC ADVANCED PO     sertraline 50 MG tablet  Commonly known as: ZOLOFT  Take 1 tablet by mouth once daily     tiotropium bromide-olodaterol 2.5-2.5 MCG/ACT aerosol solution inhaler  Commonly known as: STIOLTO RESPIMAT  Inhale 2 puffs Daily. 2 inh once a day              Comment: Please note this report has been produced using speech recognition software.      Leigh Walters PA-C

## 2024-09-05 ENCOUNTER — PREP FOR SURGERY (OUTPATIENT)
Dept: OTHER | Facility: HOSPITAL | Age: 89
End: 2024-09-05
Payer: MEDICARE

## 2024-09-05 ENCOUNTER — ANESTHESIA (OUTPATIENT)
Dept: GASTROENTEROLOGY | Facility: HOSPITAL | Age: 89
End: 2024-09-05
Payer: MEDICARE

## 2024-09-05 ENCOUNTER — APPOINTMENT (OUTPATIENT)
Dept: MRI IMAGING | Facility: HOSPITAL | Age: 89
DRG: 444 | End: 2024-09-05
Payer: MEDICARE

## 2024-09-05 ENCOUNTER — ANESTHESIA EVENT (OUTPATIENT)
Dept: GASTROENTEROLOGY | Facility: HOSPITAL | Age: 89
End: 2024-09-05
Payer: MEDICARE

## 2024-09-05 ENCOUNTER — APPOINTMENT (OUTPATIENT)
Dept: GENERAL RADIOLOGY | Facility: HOSPITAL | Age: 89
DRG: 444 | End: 2024-09-05
Payer: MEDICARE

## 2024-09-05 PROBLEM — K80.50 CHOLEDOCHOLITHIASIS: Status: ACTIVE | Noted: 2024-09-05

## 2024-09-05 PROBLEM — R40.0 SOMNOLENCE, DAYTIME: Status: RESOLVED | Noted: 2024-04-17 | Resolved: 2024-09-05

## 2024-09-05 PROBLEM — G89.29 CHRONIC BILATERAL LOW BACK PAIN WITHOUT SCIATICA: Status: RESOLVED | Noted: 2023-09-13 | Resolved: 2024-09-05

## 2024-09-05 PROBLEM — N39.0 RECURRENT UTI: Status: RESOLVED | Noted: 2024-01-03 | Resolved: 2024-09-05

## 2024-09-05 PROBLEM — N13.30 HYDRONEPHROSIS: Status: RESOLVED | Noted: 2021-05-10 | Resolved: 2024-09-05

## 2024-09-05 PROBLEM — R31.29 OTHER MICROSCOPIC HEMATURIA: Status: RESOLVED | Noted: 2023-09-28 | Resolved: 2024-09-05

## 2024-09-05 PROBLEM — M54.50 CHRONIC BILATERAL LOW BACK PAIN WITHOUT SCIATICA: Status: RESOLVED | Noted: 2023-09-13 | Resolved: 2024-09-05

## 2024-09-05 PROBLEM — Z79.899 LONG TERM CURRENT USE OF ANTIARRHYTHMIC MEDICAL THERAPY: Status: RESOLVED | Noted: 2019-05-28 | Resolved: 2024-09-05

## 2024-09-05 LAB
ANION GAP SERPL CALCULATED.3IONS-SCNC: 11 MMOL/L (ref 5–15)
BASOPHILS # BLD AUTO: 0.02 10*3/MM3 (ref 0–0.2)
BASOPHILS NFR BLD AUTO: 0.3 % (ref 0–1.5)
BUN SERPL-MCNC: 28 MG/DL (ref 8–23)
BUN/CREAT SERPL: 33.3 (ref 7–25)
CALCIUM SPEC-SCNC: 8.6 MG/DL (ref 8.6–10.5)
CHLORIDE SERPL-SCNC: 101 MMOL/L (ref 98–107)
CO2 SERPL-SCNC: 27 MMOL/L (ref 22–29)
CREAT SERPL-MCNC: 0.84 MG/DL (ref 0.76–1.27)
DEPRECATED RDW RBC AUTO: 63.9 FL (ref 37–54)
EGFRCR SERPLBLD CKD-EPI 2021: 84.4 ML/MIN/1.73
EOSINOPHIL # BLD AUTO: 0.27 10*3/MM3 (ref 0–0.4)
EOSINOPHIL NFR BLD AUTO: 4.6 % (ref 0.3–6.2)
ERYTHROCYTE [DISTWIDTH] IN BLOOD BY AUTOMATED COUNT: 19.4 % (ref 12.3–15.4)
GLUCOSE BLDC GLUCOMTR-MCNC: 112 MG/DL (ref 70–130)
GLUCOSE BLDC GLUCOMTR-MCNC: 80 MG/DL (ref 70–130)
GLUCOSE BLDC GLUCOMTR-MCNC: 80 MG/DL (ref 70–130)
GLUCOSE BLDC GLUCOMTR-MCNC: 81 MG/DL (ref 70–130)
GLUCOSE SERPL-MCNC: 82 MG/DL (ref 65–99)
HAV IGM SERPL QL IA: NORMAL
HBV CORE IGM SERPL QL IA: NORMAL
HBV SURFACE AG SERPL QL IA: NORMAL
HCT VFR BLD AUTO: 40.6 % (ref 37.5–51)
HCV AB SER QL: NORMAL
HGB BLD-MCNC: 13.3 G/DL (ref 13–17.7)
IMM GRANULOCYTES # BLD AUTO: 0.03 10*3/MM3 (ref 0–0.05)
IMM GRANULOCYTES NFR BLD AUTO: 0.5 % (ref 0–0.5)
INR PPP: 1.38 (ref 0.89–1.12)
LYMPHOCYTES # BLD AUTO: 0.77 10*3/MM3 (ref 0.7–3.1)
LYMPHOCYTES NFR BLD AUTO: 13.2 % (ref 19.6–45.3)
MCH RBC QN AUTO: 29.6 PG (ref 26.6–33)
MCHC RBC AUTO-ENTMCNC: 32.8 G/DL (ref 31.5–35.7)
MCV RBC AUTO: 90.2 FL (ref 79–97)
MONOCYTES # BLD AUTO: 0.44 10*3/MM3 (ref 0.1–0.9)
MONOCYTES NFR BLD AUTO: 7.5 % (ref 5–12)
NEUTROPHILS NFR BLD AUTO: 4.31 10*3/MM3 (ref 1.7–7)
NEUTROPHILS NFR BLD AUTO: 73.9 % (ref 42.7–76)
NRBC BLD AUTO-RTO: 0 /100 WBC (ref 0–0.2)
PLATELET # BLD AUTO: 146 10*3/MM3 (ref 140–450)
PMV BLD AUTO: 10.5 FL (ref 6–12)
POTASSIUM SERPL-SCNC: 3.2 MMOL/L (ref 3.5–5.2)
PROTHROMBIN TIME: 17.1 SECONDS (ref 12.2–14.5)
RBC # BLD AUTO: 4.5 10*6/MM3 (ref 4.14–5.8)
SODIUM SERPL-SCNC: 139 MMOL/L (ref 136–145)
WBC NRBC COR # BLD AUTO: 5.84 10*3/MM3 (ref 3.4–10.8)

## 2024-09-05 PROCEDURE — 0F798DZ DILATION OF COMMON BILE DUCT WITH INTRALUMINAL DEVICE, VIA NATURAL OR ARTIFICIAL OPENING ENDOSCOPIC: ICD-10-PCS | Performed by: INTERNAL MEDICINE

## 2024-09-05 PROCEDURE — 94799 UNLISTED PULMONARY SVC/PX: CPT

## 2024-09-05 PROCEDURE — 94640 AIRWAY INHALATION TREATMENT: CPT

## 2024-09-05 PROCEDURE — 0FC98ZZ EXTIRPATION OF MATTER FROM COMMON BILE DUCT, VIA NATURAL OR ARTIFICIAL OPENING ENDOSCOPIC: ICD-10-PCS | Performed by: INTERNAL MEDICINE

## 2024-09-05 PROCEDURE — 80074 ACUTE HEPATITIS PANEL: CPT | Performed by: INTERNAL MEDICINE

## 2024-09-05 PROCEDURE — 99223 1ST HOSP IP/OBS HIGH 75: CPT | Performed by: NURSE PRACTITIONER

## 2024-09-05 PROCEDURE — 88305 TISSUE EXAM BY PATHOLOGIST: CPT | Performed by: INTERNAL MEDICINE

## 2024-09-05 PROCEDURE — 25010000002 GLUCAGON (RDNA) PER 1 MG: Performed by: NURSE ANESTHETIST, CERTIFIED REGISTERED

## 2024-09-05 PROCEDURE — 43274 ERCP DUCT STENT PLACEMENT: CPT | Performed by: INTERNAL MEDICINE

## 2024-09-05 PROCEDURE — C1726 CATH, BAL DIL, NON-VASCULAR: HCPCS | Performed by: INTERNAL MEDICINE

## 2024-09-05 PROCEDURE — C1769 GUIDE WIRE: HCPCS | Performed by: INTERNAL MEDICINE

## 2024-09-05 PROCEDURE — G0378 HOSPITAL OBSERVATION PER HR: HCPCS

## 2024-09-05 PROCEDURE — 0D758DZ DILATION OF ESOPHAGUS WITH INTRALUMINAL DEVICE, VIA NATURAL OR ARTIFICIAL OPENING ENDOSCOPIC: ICD-10-PCS | Performed by: INTERNAL MEDICINE

## 2024-09-05 PROCEDURE — C1889 IMPLANT/INSERT DEVICE, NOC: HCPCS | Performed by: INTERNAL MEDICINE

## 2024-09-05 PROCEDURE — 85025 COMPLETE CBC W/AUTO DIFF WBC: CPT | Performed by: NURSE PRACTITIONER

## 2024-09-05 PROCEDURE — 25010000002 CEFTRIAXONE PER 250 MG: Performed by: INTERNAL MEDICINE

## 2024-09-05 PROCEDURE — 25010000002 ONDANSETRON PER 1 MG: Performed by: NURSE ANESTHETIST, CERTIFIED REGISTERED

## 2024-09-05 PROCEDURE — 74328 X-RAY BILE DUCT ENDOSCOPY: CPT | Performed by: INTERNAL MEDICINE

## 2024-09-05 PROCEDURE — 43239 EGD BIOPSY SINGLE/MULTIPLE: CPT | Performed by: INTERNAL MEDICINE

## 2024-09-05 PROCEDURE — 99214 OFFICE O/P EST MOD 30 MIN: CPT | Performed by: NURSE PRACTITIONER

## 2024-09-05 PROCEDURE — 74181 MRI ABDOMEN W/O CONTRAST: CPT

## 2024-09-05 PROCEDURE — 25010000002 SUGAMMADEX 200 MG/2ML SOLUTION: Performed by: NURSE ANESTHETIST, CERTIFIED REGISTERED

## 2024-09-05 PROCEDURE — 94761 N-INVAS EAR/PLS OXIMETRY MLT: CPT

## 2024-09-05 PROCEDURE — 99222 1ST HOSP IP/OBS MODERATE 55: CPT | Performed by: PHYSICIAN ASSISTANT

## 2024-09-05 PROCEDURE — 25010000002 DEXAMETHASONE PER 1 MG: Performed by: NURSE ANESTHETIST, CERTIFIED REGISTERED

## 2024-09-05 PROCEDURE — 74330 X-RAY BILE/PANC ENDOSCOPY: CPT

## 2024-09-05 PROCEDURE — 80048 BASIC METABOLIC PNL TOTAL CA: CPT | Performed by: NURSE PRACTITIONER

## 2024-09-05 PROCEDURE — C2617 STENT, NON-COR, TEM W/O DEL: HCPCS | Performed by: INTERNAL MEDICINE

## 2024-09-05 PROCEDURE — 25010000002 PROPOFOL 10 MG/ML EMULSION: Performed by: NURSE ANESTHETIST, CERTIFIED REGISTERED

## 2024-09-05 PROCEDURE — 82948 REAGENT STRIP/BLOOD GLUCOSE: CPT

## 2024-09-05 PROCEDURE — 25010000002 METRONIDAZOLE 500 MG/100ML SOLUTION: Performed by: INTERNAL MEDICINE

## 2024-09-05 PROCEDURE — 85610 PROTHROMBIN TIME: CPT | Performed by: NURSE PRACTITIONER

## 2024-09-05 PROCEDURE — 43249 ESOPH EGD DILATION <30 MM: CPT | Performed by: INTERNAL MEDICINE

## 2024-09-05 PROCEDURE — 94664 DEMO&/EVAL PT USE INHALER: CPT

## 2024-09-05 PROCEDURE — 25810000003 SODIUM CHLORIDE 0.9 % SOLUTION: Performed by: NURSE PRACTITIONER

## 2024-09-05 PROCEDURE — 43264 ERCP REMOVE DUCT CALCULI: CPT | Performed by: INTERNAL MEDICINE

## 2024-09-05 PROCEDURE — 43273 ENDOSCOPIC PANCREATOSCOPY: CPT | Performed by: INTERNAL MEDICINE

## 2024-09-05 PROCEDURE — 25010000002 LEVOFLOXACIN PER 250 MG: Performed by: INTERNAL MEDICINE

## 2024-09-05 RX ORDER — ACETAMINOPHEN 325 MG/1
650 TABLET ORAL EVERY 4 HOURS PRN
Status: DISCONTINUED | OUTPATIENT
Start: 2024-09-05 | End: 2024-09-08 | Stop reason: HOSPADM

## 2024-09-05 RX ORDER — METRONIDAZOLE 500 MG/100ML
500 INJECTION, SOLUTION INTRAVENOUS EVERY 8 HOURS
Status: DISCONTINUED | OUTPATIENT
Start: 2024-09-05 | End: 2024-09-08 | Stop reason: HOSPADM

## 2024-09-05 RX ORDER — SODIUM CHLORIDE 9 MG/ML
40 INJECTION, SOLUTION INTRAVENOUS AS NEEDED
Status: DISCONTINUED | OUTPATIENT
Start: 2024-09-05 | End: 2024-09-08 | Stop reason: HOSPADM

## 2024-09-05 RX ORDER — INDOMETHACIN 100 MG
SUPPOSITORY, RECTAL RECTAL AS NEEDED
Status: DISCONTINUED | OUTPATIENT
Start: 2024-09-05 | End: 2024-09-05 | Stop reason: HOSPADM

## 2024-09-05 RX ORDER — ROCURONIUM BROMIDE 10 MG/ML
INJECTION, SOLUTION INTRAVENOUS AS NEEDED
Status: DISCONTINUED | OUTPATIENT
Start: 2024-09-05 | End: 2024-09-05 | Stop reason: SURG

## 2024-09-05 RX ORDER — BISACODYL 10 MG
10 SUPPOSITORY, RECTAL RECTAL DAILY PRN
Status: DISCONTINUED | OUTPATIENT
Start: 2024-09-05 | End: 2024-09-08 | Stop reason: HOSPADM

## 2024-09-05 RX ORDER — BUPIVACAINE HCL/0.9 % NACL/PF 0.125 %
PLASTIC BAG, INJECTION (ML) EPIDURAL AS NEEDED
Status: DISCONTINUED | OUTPATIENT
Start: 2024-09-05 | End: 2024-09-05 | Stop reason: SURG

## 2024-09-05 RX ORDER — SODIUM CHLORIDE 0.9 % (FLUSH) 0.9 %
10 SYRINGE (ML) INJECTION EVERY 12 HOURS SCHEDULED
Status: DISCONTINUED | OUTPATIENT
Start: 2024-09-05 | End: 2024-09-08 | Stop reason: HOSPADM

## 2024-09-05 RX ORDER — PROPOFOL 10 MG/ML
VIAL (ML) INTRAVENOUS AS NEEDED
Status: DISCONTINUED | OUTPATIENT
Start: 2024-09-05 | End: 2024-09-05 | Stop reason: SURG

## 2024-09-05 RX ORDER — PANTOPRAZOLE SODIUM 40 MG/1
40 TABLET, DELAYED RELEASE ORAL
Status: DISCONTINUED | OUTPATIENT
Start: 2024-09-05 | End: 2024-09-08 | Stop reason: HOSPADM

## 2024-09-05 RX ORDER — SODIUM CHLORIDE 9 MG/ML
75 INJECTION, SOLUTION INTRAVENOUS CONTINUOUS
Status: ACTIVE | OUTPATIENT
Start: 2024-09-05 | End: 2024-09-05

## 2024-09-05 RX ORDER — SODIUM CHLORIDE 0.9 % (FLUSH) 0.9 %
10 SYRINGE (ML) INJECTION AS NEEDED
Status: DISCONTINUED | OUTPATIENT
Start: 2024-09-05 | End: 2024-09-08 | Stop reason: HOSPADM

## 2024-09-05 RX ORDER — AMOXICILLIN 250 MG
2 CAPSULE ORAL 2 TIMES DAILY PRN
Status: DISCONTINUED | OUTPATIENT
Start: 2024-09-05 | End: 2024-09-08 | Stop reason: HOSPADM

## 2024-09-05 RX ORDER — DEXTROSE MONOHYDRATE 25 G/50ML
25 INJECTION, SOLUTION INTRAVENOUS
Status: DISCONTINUED | OUTPATIENT
Start: 2024-09-05 | End: 2024-09-08 | Stop reason: HOSPADM

## 2024-09-05 RX ORDER — METRONIDAZOLE 500 MG/100ML
500 INJECTION, SOLUTION INTRAVENOUS EVERY 8 HOURS
Status: DISCONTINUED | OUTPATIENT
Start: 2024-09-05 | End: 2024-09-05

## 2024-09-05 RX ORDER — TAMSULOSIN HYDROCHLORIDE 0.4 MG/1
1 CAPSULE ORAL DAILY
COMMUNITY

## 2024-09-05 RX ORDER — ASPIRIN 81 MG/1
81 TABLET, CHEWABLE ORAL DAILY
Status: DISCONTINUED | OUTPATIENT
Start: 2024-09-05 | End: 2024-09-08 | Stop reason: HOSPADM

## 2024-09-05 RX ORDER — POLYETHYLENE GLYCOL 3350 17 G/17G
17 POWDER, FOR SOLUTION ORAL DAILY PRN
Status: DISCONTINUED | OUTPATIENT
Start: 2024-09-05 | End: 2024-09-08 | Stop reason: HOSPADM

## 2024-09-05 RX ORDER — DEXAMETHASONE SODIUM PHOSPHATE 4 MG/ML
INJECTION, SOLUTION INTRA-ARTICULAR; INTRALESIONAL; INTRAMUSCULAR; INTRAVENOUS; SOFT TISSUE AS NEEDED
Status: DISCONTINUED | OUTPATIENT
Start: 2024-09-05 | End: 2024-09-05 | Stop reason: SURG

## 2024-09-05 RX ORDER — NITROGLYCERIN 0.4 MG/1
0.4 TABLET SUBLINGUAL
Status: DISCONTINUED | OUTPATIENT
Start: 2024-09-05 | End: 2024-09-08 | Stop reason: HOSPADM

## 2024-09-05 RX ORDER — NICOTINE POLACRILEX 4 MG
15 LOZENGE BUCCAL
Status: DISCONTINUED | OUTPATIENT
Start: 2024-09-05 | End: 2024-09-08 | Stop reason: HOSPADM

## 2024-09-05 RX ORDER — IBUPROFEN 600 MG/1
TABLET ORAL AS NEEDED
Status: DISCONTINUED | OUTPATIENT
Start: 2024-09-05 | End: 2024-09-05 | Stop reason: SURG

## 2024-09-05 RX ORDER — BISACODYL 5 MG/1
5 TABLET, DELAYED RELEASE ORAL DAILY PRN
Status: DISCONTINUED | OUTPATIENT
Start: 2024-09-05 | End: 2024-09-08 | Stop reason: HOSPADM

## 2024-09-05 RX ORDER — ACETAMINOPHEN 160 MG/5ML
650 SOLUTION ORAL EVERY 4 HOURS PRN
Status: DISCONTINUED | OUTPATIENT
Start: 2024-09-05 | End: 2024-09-08 | Stop reason: HOSPADM

## 2024-09-05 RX ORDER — SODIUM CHLORIDE 9 MG/ML
INJECTION, SOLUTION INTRAVENOUS CONTINUOUS PRN
Status: DISCONTINUED | OUTPATIENT
Start: 2024-09-05 | End: 2024-09-05 | Stop reason: SURG

## 2024-09-05 RX ORDER — ONDANSETRON 2 MG/ML
4 INJECTION INTRAMUSCULAR; INTRAVENOUS ONCE AS NEEDED
Status: DISCONTINUED | OUTPATIENT
Start: 2024-09-05 | End: 2024-09-05

## 2024-09-05 RX ORDER — IPRATROPIUM BROMIDE AND ALBUTEROL SULFATE 2.5; .5 MG/3ML; MG/3ML
3 SOLUTION RESPIRATORY (INHALATION) ONCE AS NEEDED
Status: DISCONTINUED | OUTPATIENT
Start: 2024-09-05 | End: 2024-09-05

## 2024-09-05 RX ORDER — LIDOCAINE HYDROCHLORIDE 10 MG/ML
INJECTION, SOLUTION EPIDURAL; INFILTRATION; INTRACAUDAL; PERINEURAL AS NEEDED
Status: DISCONTINUED | OUTPATIENT
Start: 2024-09-05 | End: 2024-09-05 | Stop reason: SURG

## 2024-09-05 RX ORDER — IBUPROFEN 600 MG/1
1 TABLET ORAL
Status: DISCONTINUED | OUTPATIENT
Start: 2024-09-05 | End: 2024-09-08 | Stop reason: HOSPADM

## 2024-09-05 RX ORDER — ARFORMOTEROL TARTRATE 15 UG/2ML
15 SOLUTION RESPIRATORY (INHALATION)
Status: DISCONTINUED | OUTPATIENT
Start: 2024-09-05 | End: 2024-09-08 | Stop reason: HOSPADM

## 2024-09-05 RX ORDER — IPRATROPIUM BROMIDE AND ALBUTEROL SULFATE 2.5; .5 MG/3ML; MG/3ML
3 SOLUTION RESPIRATORY (INHALATION) EVERY 4 HOURS PRN
Status: DISCONTINUED | OUTPATIENT
Start: 2024-09-05 | End: 2024-09-08 | Stop reason: HOSPADM

## 2024-09-05 RX ORDER — ACETAMINOPHEN 650 MG/1
650 SUPPOSITORY RECTAL EVERY 4 HOURS PRN
Status: DISCONTINUED | OUTPATIENT
Start: 2024-09-05 | End: 2024-09-08 | Stop reason: HOSPADM

## 2024-09-05 RX ORDER — ONDANSETRON 2 MG/ML
INJECTION INTRAMUSCULAR; INTRAVENOUS AS NEEDED
Status: DISCONTINUED | OUTPATIENT
Start: 2024-09-05 | End: 2024-09-05 | Stop reason: SURG

## 2024-09-05 RX ORDER — LEVOFLOXACIN 5 MG/ML
500 INJECTION, SOLUTION INTRAVENOUS EVERY 24 HOURS
Status: COMPLETED | OUTPATIENT
Start: 2024-09-05 | End: 2024-09-05

## 2024-09-05 RX ADMIN — SUGAMMADEX 200 MG: 100 INJECTION, SOLUTION INTRAVENOUS at 15:15

## 2024-09-05 RX ADMIN — SODIUM CHLORIDE 75 ML/HR: 9 INJECTION, SOLUTION INTRAVENOUS at 06:05

## 2024-09-05 RX ADMIN — IPRATROPIUM BROMIDE 0.5 MG: 0.5 SOLUTION RESPIRATORY (INHALATION) at 18:49

## 2024-09-05 RX ADMIN — LEVOFLOXACIN 500 MG: 5 INJECTION, SOLUTION INTRAVENOUS at 15:00

## 2024-09-05 RX ADMIN — ONDANSETRON 4 MG: 2 INJECTION INTRAMUSCULAR; INTRAVENOUS at 15:15

## 2024-09-05 RX ADMIN — Medication 15 MG/HR: at 14:41

## 2024-09-05 RX ADMIN — ARFORMOTEROL TARTRATE 15 MCG: 15 SOLUTION RESPIRATORY (INHALATION) at 18:48

## 2024-09-05 RX ADMIN — METRONIDAZOLE 500 MG: 5 INJECTION, SOLUTION INTRAVENOUS at 20:30

## 2024-09-05 RX ADMIN — PANTOPRAZOLE SODIUM 40 MG: 40 TABLET, DELAYED RELEASE ORAL at 18:15

## 2024-09-05 RX ADMIN — Medication 100 MCG: at 14:59

## 2024-09-05 RX ADMIN — ROCURONIUM BROMIDE 80 MG: 10 INJECTION INTRAVENOUS at 14:21

## 2024-09-05 RX ADMIN — Medication 100 MCG: at 14:35

## 2024-09-05 RX ADMIN — Medication 5 MG/HR: at 03:00

## 2024-09-05 RX ADMIN — GLUCAGON 0.5 MG: KIT at 14:55

## 2024-09-05 RX ADMIN — Medication 200 MCG: at 14:43

## 2024-09-05 RX ADMIN — Medication 10 ML: at 20:30

## 2024-09-05 RX ADMIN — ARFORMOTEROL TARTRATE 15 MCG: 15 SOLUTION RESPIRATORY (INHALATION) at 08:59

## 2024-09-05 RX ADMIN — Medication 100 MCG: at 14:26

## 2024-09-05 RX ADMIN — Medication 100 MCG: at 14:28

## 2024-09-05 RX ADMIN — Medication 100 MCG: at 15:02

## 2024-09-05 RX ADMIN — PROPOFOL 80 MG: 10 INJECTION, EMULSION INTRAVENOUS at 14:21

## 2024-09-05 RX ADMIN — IPRATROPIUM BROMIDE 0.5 MG: 0.5 SOLUTION RESPIRATORY (INHALATION) at 08:59

## 2024-09-05 RX ADMIN — LIDOCAINE HYDROCHLORIDE 100 MG: 10 INJECTION, SOLUTION EPIDURAL; INFILTRATION; INTRACAUDAL; PERINEURAL at 14:21

## 2024-09-05 RX ADMIN — ASPIRIN 81 MG 81 MG: 81 TABLET ORAL at 17:46

## 2024-09-05 RX ADMIN — DEXAMETHASONE SODIUM PHOSPHATE 8 MG: 4 INJECTION INTRA-ARTICULAR; INTRALESIONAL; INTRAMUSCULAR; INTRAVENOUS; SOFT TISSUE at 14:21

## 2024-09-05 RX ADMIN — SODIUM CHLORIDE: 9 INJECTION, SOLUTION INTRAMUSCULAR; INTRAVENOUS; SUBCUTANEOUS at 14:15

## 2024-09-05 RX ADMIN — SODIUM CHLORIDE 2000 MG: 900 INJECTION INTRAVENOUS at 20:30

## 2024-09-05 NOTE — H&P
[facility] Medicine Services  HISTORY AND PHYSICAL    Patient Name: Darien Camarena  : 1935  MRN: [id number]  Primary Care Physician: Pito Way MD  Date of admission: 2024    Subjective   Subjective     Chief Complaint:  Jaundice     HPI:  Darien Camarena is a 88 y.o. male with a history of afib, CKD, CAD, CHF, COPD, GERD, DM, STEVEN on CPAP, lung cancer, HTN, HLD, was transferred from [facility] with jaundice.  Patient reports that he was experiencing nausea and vomiting on .  He also had diarrhea and noticed some blood in his stool.  He endorses abdominal pain, fatigue, cough, and generalized weakness.  Yesterday he noticed a yellow discoloration to his skin.  No shortness of air, chest pain, fevers, chills, dysuria, or any other complaints at this time.  Labs and imaging concerning for choledocholithiasis.  Patient was found to be in afib with RVR at OSH and started on a diltiazem drip.  Patient is being admitted to the Hospitalist for further evaluation and management.    Review of Systems   Constitutional:  Positive for appetite change and fatigue. Negative for chills and fever.   HENT: Negative.     Eyes: Negative.    Respiratory:  Positive for cough. Negative for shortness of breath.    Cardiovascular: Negative.    Gastrointestinal:  Positive for abdominal pain, diarrhea, nausea and vomiting.   Endocrine: Negative.    Genitourinary: Negative.    Musculoskeletal: Negative.    Skin:  Positive for color change.   Allergic/Immunologic: Negative.    Neurological:  Positive for weakness. Negative for dizziness, light-headedness and headaches.   Hematological: Negative.    Psychiatric/Behavioral:  Positive for confusion.                 Personal History     Past Medical History:   Diagnosis Date    Arrhythmia     Atrial fibrillation     Bilateral leg cramps     possible new medication related side effects    Chronic kidney disease     Clotting disorder     pt  doesnt know about this    COPD (chronic obstructive pulmonary disease)     Coronary artery disease     Diabetes mellitus     doesnt check sugar    E. coli sepsis 06/23/2022    Enlarged prostate without lower urinary tract symptoms (luts) 06/20/2016    Full dentures     GERD (gastroesophageal reflux disease)     Gout     Hearing aid worn     bilat prn    History of colonoscopy 09/12/2012    History of radiation therapy 02/24/2023    SBRT LLL lung    [place] hearing[place]     hearing aids prn    Hyperlipidemia     Hypertension     Kidney stone     surgery x1    Lung cancer     STEVEN on CPAP     compliant with machine    PAF (paroxysmal atrial fibrillation)     Prostatism     Sleep apnea     CPAP HS    Urinary incontinence     Urinary tract infection     Wears glasses     readers         Oncology Problem List:  Malignant neoplasm of lower lobe of left lung (01/30/2023; Status:   Active)    Oncology/Hematology History   Malignant neoplasm of lower lobe of left lung   1/30/2023 Initial Diagnosis    Malignant neoplasm of lower lobe of left lung (HCC)     2/14/2023 - 2/24/2023 Radiation    Radiation OncologyTreatment Course:  Darien Camarena received 5000 cGy in 5 fractions to left lung via External Beam Radiation - EBRT.         Past Surgical History:   Procedure Laterality Date    ATRIAL APPENDAGE EXCLUSION LEFT WITH TRANSESOPHAGEAL ECHOCARDIOGRAM N/A 11/28/2023    Procedure: Atrial Appendage Occlusion;  Surgeon: Anthony Mcdaniel MD;  Location:  [facility];  Service: Cardiovascular;  Laterality: N/A;    CARDIAC CATHETERIZATION Left 09/29/2022    Procedure: Left Heart Cath;  Surgeon: Titus Oliveros IV, MD;  Location:  [facility];  Service: Cardiovascular;  Laterality: Left;    CARDIOVERSION      CATARACT EXTRACTION Bilateral     COLONOSCOPY      CYSTOSCOPY      ENDOSCOPY      possible    KIDNEY STONE SURGERY      x1       Family History:  family history includes Alzheimer's  disease in his mother; COPD in his father; Cancer in his father; Kidney disease in his father; Lung cancer in his father; No Known Problems in his daughter, daughter, and daughter.     Social History:  reports that he quit smoking about 64 years ago. His smoking use included cigarettes. He started smoking about 77 years ago. He has a 15 pack-year smoking history. He has been exposed to tobacco smoke. He quit smokeless tobacco use about 13 years ago.  His smokeless tobacco use included chew. He reports that he does not drink alcohol and does not use drugs.  Social History     Social History Narrative    Caffeine: 1 cup of decaff coffee daily        Medications:  Coenzyme Q10, Iron, Probiotic Product, albuterol sulfate HFA, allopurinol, ascorbic acid, aspirin, docusate sodium, donepezil, finasteride, furosemide, memantine, metFORMIN, metOLazone, methenamine, metoprolol tartrate, multivitamin with minerals, omeprazole, pravastatin, sertraline, tamsulosin, and tiotropium bromide-olodaterol    Allergies   Allergen Reactions    Xarelto [Rivaroxaban] GI Bleeding     GI bleed    Sglt2 Inhibitors Other (See Comments)     Recurrent UTI    Penicillins Hives     Has tolerated cefepime, ceftriaxone, cefazolin, cefdinir, cefuroxime, cephalexin       Objective   Objective     Vital Signs:   Temp:  [97.8 °F (36.6 °C)-98.1 °F (36.7 °C)] 98.1 °F (36.7 °C)  Heart Rate:  [101-126] 101  Resp:  [16] 16  BP: (111-143)/() 134/104    Physical Exam   Constitutional: Awake, alert, resting in bed, family at bedside  Eyes: PERRLA, sclerae anicteric, no conjunctival injection  HENT: NCAT, mucous membranes moist  Neck: Supple, no thyromegaly, no lymphadenopathy, trachea midline  Respiratory: Clear to auscultation bilaterally, nonlabored respirations   Cardiovascular: irregularly irregular, tachycardia, no murmurs, rubs, or gallops, palpable pedal pulses bilaterally  Gastrointestinal: Positive bowel sounds, soft, nontender,  nondistended  Musculoskeletal: 1+ BLE edema, no clubbing or cyanosis to extremities  Psychiatric: Appropriate affect, cooperative  Neurologic: Oriented x 3, strength symmetric in all extremities, Cranial Nerves grossly intact to confrontation, speech clear  Skin: jaundice       Result Review:  I have personally reviewed the results from the time of this admission to 9/5/2024 03:30 EDT and agree with these findings:  [x]  Laboratory list / accordion  []  Microbiology  [x]  Radiology  []  EKG/Telemetry   []  Cardiology/Vascular   []  Pathology  [x]  Old records  []  Other:  Most notable findings include:     LAB RESULTS:      Lab 09/04/24  1821   WBC 7.14   HEMOGLOBIN 13.3   HEMATOCRIT 41.6   PLATELETS 182   NEUTROS ABS 5.81   IMMATURE GRANS (ABS) 0.01   LYMPHS ABS 0.73   MONOS ABS 0.51   EOS ABS 0.07   MCV 89.5   PROTIME 17.2*   APTT 34.0*         Lab 09/04/24  1821   SODIUM 137   POTASSIUM 3.6   CHLORIDE 100   CO2 27.7   ANION GAP 9.3   BUN 32*   CREATININE 1.09   EGFR 65.7   GLUCOSE 112*   CALCIUM 9.0   MAGNESIUM 1.6         Lab 09/04/24  1821   TOTAL PROTEIN 6.4   ALBUMIN 3.1*   GLOBULIN 3.3   ALT (SGPT) 65*   AST (SGOT) 74*   BILIRUBIN 8.1*   ALK PHOS 423*   LIPASE 18         Lab 09/04/24 2026 09/04/24  1821   HSTROP T 27* 30*   PROTIME  --  17.2*   INR  --  1.34*                 Brief Urine Lab Results  (Last result in the past 365 days)        Color   Clarity   Blood   Leuk Est   Nitrite   Protein   CREAT   Urine HCG        09/04/24 2148 Yellow   Clear   Negative   Small (1+)   Negative   Trace                 Microbiology Results (last 10 days)       ** No results found for the last 240 hours. **            CT Abdomen Pelvis With Contrast    Result Date: 9/4/2024  CT ANGIOGRAM CHEST PULMONARY EMBOLISM, CT ABDOMEN PELVIS W CONTRAST Date of Exam: 9/4/2024 7:42 PM EDT Indication: soa. Comparison: 4/15/2024 Technique: Axial CT images were obtained of the chest and CT of the abdomen and pelvis after the uneventful  intravenous administration of 86 cc Isovue-370 IV contrast utilizing pulmonary embolism protocol.  Reconstructed coronal and sagittal images were also obtained. Automated exposure control and iterative construction methods were used. Findings: The thyroid gland is normal. The subglottic airway is clear. No aortic dissection. 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 No pulmonary embolus. Small right and trace left pleural effusions. No pericardial effusion. Liver: 0.8 cm right hepatic cyst. Spleen:No masses. No perisplenic hematoma. Pancreas:No pancreatic masses. No evidence of pancreatitis. Gallbladder and common bile duct: Dilated gallbladder with extrahepatic biliary ductal dilatation. There appears to be material possibly noncalcified calculus within the still common bile duct resulting in partial obstruction. Adrenal glands:No adrenal masses Kidneys and ureters: Exophytic 2.3 cm left renal cyst. Exophytic 2 cm left renal cyst. Prominent left extrarenal pelvis and hydroureter on a chronic basis extending to the urinary bladder. Urinary bladder:No urinary bladder wall thickening. No bladder masses. Small bowel:Normal caliber small bowel. Large bowel:No diverticulosis or diverticulitis. No large bowel masses are appreciated Appendix: Normal appendix. GENITOURINARY: Normal prostate Ascites or pneumoperitoneum:None. Adenopathy:None present Osseous structures: Degenerative changes of the hips. The proximal femurs are intact. The pubic bones are intact. The sacroiliac joints are normal. Multilevel degenerative changes of the lumbar spine. Other findings: None     Impression: 1. There appears to be debris or noncalcified calculi within the distal common bile duct. No evidence of cholecystitis at this time. 2. No pulmonary embolus. 3. Small right and trace left pleural effusions. 4. 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 Cardiology consult recommended. Electronically Signed: Yoni Rios MD   9/4/2024 8:06 PM EDT  Workstation ID: [id number]    CT Angiogram Chest Pulmonary Embolism    Result Date: 9/4/2024  CT ANGIOGRAM CHEST PULMONARY EMBOLISM, CT ABDOMEN PELVIS W CONTRAST Date of Exam: 9/4/2024 7:42 PM EDT Indication: soa. Comparison: 4/15/2024 Technique: Axial CT images were obtained of the chest and CT of the abdomen and pelvis after the uneventful intravenous administration of 86 cc Isovue-370 IV contrast utilizing pulmonary embolism protocol.  Reconstructed coronal and sagittal images were also obtained. Automated exposure control and iterative construction methods were used. Findings: The thyroid gland is normal. The subglottic airway is clear. No aortic dissection. 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 No pulmonary embolus. Small right and trace left pleural effusions. No pericardial effusion. Liver: 0.8 cm right hepatic cyst. Spleen:No masses. No perisplenic hematoma. Pancreas:No pancreatic masses. No evidence of pancreatitis. Gallbladder and common bile duct: Dilated gallbladder with extrahepatic biliary ductal dilatation. There appears to be material possibly noncalcified calculus within the still common bile duct resulting in partial obstruction. Adrenal glands:No adrenal masses Kidneys and ureters: Exophytic 2.3 cm left renal cyst. Exophytic 2 cm left renal cyst. Prominent left extrarenal pelvis and hydroureter on a chronic basis extending to the urinary bladder. Urinary bladder:No urinary bladder wall thickening. No bladder masses. Small bowel:Normal caliber small bowel. Large bowel:No diverticulosis or diverticulitis. No large bowel masses are appreciated Appendix: Normal appendix. GENITOURINARY: Normal prostate Ascites or pneumoperitoneum:None. Adenopathy:None present Osseous structures: Degenerative changes of the hips. The proximal femurs are intact. The pubic bones are intact. The sacroiliac joints are normal. Multilevel degenerative changes of the lumbar spine. Other  findings: None     Impression: 1. There appears to be debris or noncalcified calculi within the distal common bile duct. No evidence of cholecystitis at this time. 2. No pulmonary embolus. 3. Small right and trace left pleural effusions. 4. 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 Cardiology consult recommended. Electronically Signed: Yoni Rios MD  9/4/2024 8:06 PM EDT  Workstation ID: [id number]    CT Head Without Contrast    Result Date: 9/4/2024  CT HEAD WO CONTRAST Date of Exam: 9/4/2024 7:36 PM EDT Indication: confusion. Comparison: MRI brain 11/13/2023 Technique: Axial CT images were obtained of the head without contrast administration.  Automated exposure control and iterative construction methods were used. Findings: Negative for large territory loss of gray-white differentiation, acute intracranial hemorrhage, large parenchymal mass, midline shift or hydrocephalus. Stable incidental benign left temporal arachnoid cyst. Mild global parenchymal volume loss stable from  prior. Mild periventricular and subcortical hypodensity suggesting chronic microvascular ischemic change stable from prior. No large extra-axial fluid collection. Symmetric appearing globes. Distal carotid and vertebral artery atherosclerotic plaque. Layering fluid in the right maxillary sinus with asymmetric mucoperiosteal thickening. This could reflect acute on chronic sinusitis in the right clinical setting. No large mastoid effusion. Negative for depressed skull fracture. Degenerative changes at the dens.     Impression: Impression: 1. No acute intracranial findings by CT. Chronic findings detailed above similar to previous MRI brain comparison. 2. Possible acute on chronic sinusitis in the right clinical setting, with layering fluid noted in the right maxillary sinus. Electronically Signed: Compa Dominguez MD  9/4/2024 7:56 PM EDT  Workstation ID: [id number]    XR Chest 1 View    Result Date: 9/4/2024  XR CHEST 1 VW Date of  Exam: 9/4/2024 5:55 PM EDT Indication: Weak/Dizzy/AMS triage protocol Comparison: Chest radiograph 6/3/2024 Findings: Cardiomediastinal silhouette unchanged from prior. Platelike region of atelectasis versus scar left lower lobe similar to prior. Slight elevated left hemidiaphragm similar to prior. No new consolidation, edema, effusion or pneumothorax. Degenerative related osseous changes. Stable radiographic appearance of the chest since 6/3/2024.     Impression: Impression: No acute radiographic findings. Stable appearance of the chest since 6/3/2024 comparison. Electronically Signed: Compa Dominguez MD  9/4/2024 6:13 PM EDT  Workstation ID: [id number]     Results for orders placed during the hospital encounter of 01/25/24    Adult Transthoracic Echo Limited W/ Cont if Necessary Per Protocol    Interpretation Summary    Left ventricular systolic function is low normal. Calculated left ventricular EF = 47.8% Left ventricular ejection fraction appears to be 46 - 50%.      Assessment & Plan   Assessment & Plan       Type 2 diabetes mellitus with stage 3 chronic kidney disease, without long-term current use of insulin    Hyperlipidemia LDL goal <100    Essential hypertension    Permanent atrial fibrillation    Stage 3 chronic kidney disease    Coronary artery disease involving native coronary artery of native heart without angina pectoris    Presence of Watchman left atrial appendage closure device    Heart failure with mildly reduced ejection fraction (HFmrEF)    Darien Camarena is a 88 y.o. male with a history of afib, CKD, CAD, CHF, COPD, GERD, DM, STEVEN on CPAP, lung cancer, HTN, HLD, was transferred from [facility] with jaundice.      Choledocholithiasis   Elevated LFT's  -- alk phos 423, ast 74, alt 65, bili 8.1  -- CT abdomen pelvis there is debris or noncalcified calculi within the distal common bile duct.  No evidence of cholecystitis at this time.  -- MRCP if positive will consult GI  -- consult general surgery  --  npo  -- IV fluids  -- pain control  -- am labs    CAD  -- CT abdomen pelvis shows severe atheromatous disease.  -- cardiology consult recommended on imaging--will consult in the am  -- consult Dr. Oliveros for surgical/procedure clearance    T2DM  -- A1c in the am  -- fsbg with ssi    Afib with RVR s/p watchman device  -- continue diltiazem drip    HFmrEF  -- strict I&O's  -- daily weights    Pyuria  -- UA:  leukocytes small, protein trace, bili moderate, wbc 11-20, bacteria 3+, squamous 3-6  -- was give a dose of Rocephin at OSH  -- urine culture pending    Urinary retention  -- urinary retention protocol  -- self caths 4 times daily    COPD  -- duonebs    CKD  -- creatinine 1.09  -- stable    Dementia  Depression  -- CT head shows no acute intracranial findings   -- family at bedside states that patient is at his baseline    Pleural effusion  -- CT shows small right and trace left pleural effusions  -- monitor        DVT prophylaxis:  Mechanical    CODE STATUS:    Level Of Support Discussed With: Patient  Code Status (Patient has no pulse and is not breathing): CPR (Attempt to Resuscitate)  Medical Interventions (Patient has pulse or is breathing): Full Support      Expected Discharge  TBD  Expected discharge date/ time has not been documented.        Signature: Electronically signed by BERYL Cosme, 09/05/24, 3:30 AM EDT.     ----------------------    The patient was seen independently by the APC.  I was available for any questions or concerns.     Electronically signed by Topher Walls III, DO, 09/05/24, 5:20 AM EDT.

## 2024-09-05 NOTE — PLAN OF CARE
Goal Outcome Evaluation:              Outcome Evaluation: Patient continues on cardiezem gtt, current rate 15mg/h, Taken to MRI early this morning for ERCP, tolerated wee, denies pain, NAD noted at this time.

## 2024-09-05 NOTE — ED NOTES
Darien Camarena    Nursing Report ED to Floor:  Mental status: a&ox4  Ambulatory status with assist x1  Oxygen Therapy:  ra  Cardiac Rhythm: afib  Admitted from: [place] ED  Safety Concerns:  none  Social Issues: none  ED Room #:  18     ED Nurse Phone Extension - [phone] or may call .      HPI:   Chief Complaint   Patient presents with    Dizziness       Past Medical History:  Past Medical History:   Diagnosis Date    Arrhythmia     Atrial fibrillation     Bilateral leg cramps     possible new medication related side effects    Chronic kidney disease     Clotting disorder     pt doesnt know about this    COPD (chronic obstructive pulmonary disease)     Coronary artery disease     Diabetes mellitus     doesnt check sugar    E. coli sepsis 06/23/2022    Enlarged prostate without lower urinary tract symptoms (luts) 06/20/2016    Full dentures     GERD (gastroesophageal reflux disease)     Gout     Hearing aid worn     bilat prn    History of colonoscopy 09/12/2012    History of radiation therapy 02/24/2023    SBRT LLL lung    [place] (hard of hearing)     hearing aids prn    Hyperlipidemia     Hypertension     Kidney stone     surgery x1    Lung cancer     STEVEN on CPAP     compliant with machine    PAF (paroxysmal atrial fibrillation)     Prostatism     Sleep apnea     CPAP HS    Urinary incontinence     Urinary tract infection     Wears glasses     readers        Past Surgical History:  Past Surgical History:   Procedure Laterality Date    ATRIAL APPENDAGE EXCLUSION LEFT WITH TRANSESOPHAGEAL ECHOCARDIOGRAM N/A 11/28/2023    Procedure: Atrial Appendage Occlusion;  Surgeon: Anthony Mcdaniel MD;  Location:  [facility];  Service: Cardiovascular;  Laterality: N/A;    CARDIAC CATHETERIZATION Left 09/29/2022    Procedure: Left Heart Cath;  Surgeon: Titus Oliveros IV, MD;  Location:  [facility];  Service: Cardiovascular;  Laterality: Left;    CARDIOVERSION      CATARACT  EXTRACTION Bilateral     COLONOSCOPY      CYSTOSCOPY      ENDOSCOPY      possible    KIDNEY STONE SURGERY      x1        Admitting Doctor:   No admitting provider for patient encounter.    Consulting Provider(s):  Consults       No orders found from 8/6/2024 to 9/5/2024.             Admitting Diagnosis:   The primary encounter diagnosis was Choledocholithiasis. Diagnoses of Atrial fibrillation with RVR, Dementia, unspecified dementia severity, unspecified dementia type, unspecified whether behavioral, psychotic, or mood disturbance or anxiety, Elevated troponin, and Cystitis were also pertinent to this visit.    Most Recent Vitals:   Vitals:    09/04/24 2232 09/04/24 2255 09/04/24 2256 09/04/24 2300   BP: 111/82 (!) 133/101 (!) 133/101 133/96   Pulse: 115 113 107 111   Resp:       Temp:       TempSrc:       SpO2: 95% 95%  94%   Weight:       Height:           Active LDAs/IV Access:   Lines, Drains & Airways       Active LDAs       Name Placement date Placement time Site Days    Peripheral IV 09/04/24 1836 Left Antecubital 09/04/24 1836  Antecubital  less than 1                    Labs (abnormal labs have a star):   Labs Reviewed   COMPREHENSIVE METABOLIC PANEL - Abnormal; Notable for the following components:       Result Value    Glucose 112 (*)     BUN 32 (*)     Albumin 3.1 (*)     ALT (SGPT) 65 (*)     AST (SGOT) 74 (*)     Alkaline Phosphatase 423 (*)     Total Bilirubin 8.1 (*)     BUN/Creatinine Ratio 29.4 (*)     All other components within normal limits    Narrative:     GFR Normal >60  Chronic Kidney Disease <60  Kidney Failure <15    The GFR formula is only valid for adults with stable renal function between ages 18 and 70.   SINGLE HS TROPONIN T - Abnormal; Notable for the following components:    HS Troponin T 30 (*)     All other components within normal limits   URINALYSIS W/ MICROSCOPIC IF INDICATED (NO CULTURE) - Abnormal; Notable for the following components:    Bilirubin, UA Moderate (2+) (*)      Protein, UA Trace (*)     Leuk Esterase, UA Small (1+) (*)     All other components within normal limits   CBC WITH AUTO DIFFERENTIAL - Abnormal; Notable for the following components:    RDW 19.3 (*)     RDW-SD 64.8 (*)     Neutrophil % 81.5 (*)     Lymphocyte % 10.2 (*)     All other components within normal limits   APTT - Abnormal; Notable for the following components:    PTT 34.0 (*)     All other components within normal limits    Narrative:     PTT = The equivalent PTT values for the therapeutic range of heparin levels at 0.3 to 0.5 U/ml are 60 to 70 seconds.   PROTIME-INR - Abnormal; Notable for the following components:    Protime 17.2 (*)     INR 1.34 (*)     All other components within normal limits   TROPONIN - Abnormal; Notable for the following components:    HS Troponin T 27 (*)     All other components within normal limits   URINALYSIS, MICROSCOPIC ONLY - Abnormal; Notable for the following components:    WBC, UA 11-20 (*)     Bacteria, UA 3+ (*)     Squamous Epithelial Cells, UA 3-6 (*)     All other components within normal limits   MAGNESIUM - Normal   LIPASE - Normal   AMMONIA - Normal   URINE DRUG SCREEN - Normal    Narrative:     Cutoff For Drugs Screened:    Amphetamines               500 ng/ml  Barbiturates               200 ng/ml  Benzodiazepines            150 ng/ml  Cocaine                    150 ng/ml  Methadone                  200 ng/ml  Opiates                    100 ng/ml  Phencyclidine               25 ng/ml  THC                         50 ng/ml  Methamphetamine            500 ng/ml  Tricyclic Antidepressants  300 ng/ml  Oxycodone                  100 ng/ml  Buprenorphine               10 ng/ml    The normal value for all drugs tested is negative. This report includes unconfirmed screening results, with the cutoff values listed, to be used for medical treatment purposes only.  Unconfirmed results must not be used for non-medical purposes such as employment or legal testing.  Clinical  consideration should be applied to any drug of abuse test, particularly when unconfirmed results are used.     FENTANYL, URINE - Normal    Narrative:     Negative Threshold:      Fentanyl 5 ng/mL     The normal value for the drug tested is negative. This report includes final unconfirmed screening results to be used for medical treatment purposes only. Unconfirmed results must not be used for non-medical purposes such as employment or legal testing. Clinical consideration should be applied to any drug of abuse test, particularly when unconfirmed results are used.          GASTROINTESTINAL PANEL, PCR (PREFERRED) DOES NOT INCLUDE CDIFF   URINE CULTURE   RAINBOW DRAW    Narrative:     The following orders were created for panel order [place] Draw.  Procedure                               Abnormality         Status                     ---------                               -----------         ------                     Green Top (Gel)[[id number]]                                  Final result               Lavender Top[[id number]]                                     Final result               Gold Top - SST[[id number]]                                   Final result               Gray Top[[id number]]                                         Final result               Light Blue Top[[id number]]                                   Final result                 Please view results for these tests on the individual orders.   HEPATITIS PANEL, ACUTE   HIGH SENSITIVITIY TROPONIN T 2HR   POCT GLUCOSE FINGERSTICK   CBC AND DIFFERENTIAL    Narrative:     The following orders were created for panel order CBC & Differential.  Procedure                               Abnormality         Status                     ---------                               -----------         ------                     CBC Auto Differential[[id number]]        Abnormal            Final result                 Please view results for these tests on the individual  orders.   GREEN TOP   LAVENDER TOP   GOLD TOP - SST   GRAY TOP   LIGHT BLUE TOP       Meds Given in ED:   Medications   sodium chloride 0.9 % flush 10 mL (has no administration in time range)   dilTIAZem (CARDIZEM) 125 mg in 125 mL D5W infusion (5 mg/hr Intravenous New Bag 9/4/24 2256)   cefTRIAXone (ROCEPHIN) 1,000 mg in sodium chloride 0.9 % 100 mL MBP (1,000 mg Intravenous New Bag 9/4/24 2301)   dilTIAZem (CARDIZEM) injection 10 mg (10 mg Intravenous Given 9/4/24 2014)   iopamidol (ISOVUE-370) 76 % injection 100 mL (86 mL Intravenous Given 9/4/24 1952)   dilTIAZem (CARDIZEM) injection 10 mg (10 mg Intravenous Given 9/4/24 2105)     dilTIAZem, 5-15 mg/hr, Last Rate: 5 mg/hr (09/04/24 2256)         Last NIH score:                                                          Dysphagia screening results:  Patient Factors Component (Dysphagia:Stroke or Rule-out)  Best Eye Response: 4-->(E4) spontaneous (09/04/24 1823)  Best Motor Response: 6-->(M6) obeys commands (09/04/24 1823)  Best Verbal Response: 5-->(V5) oriented (09/04/24 1823)  [place] Coma Scale Score: 15 (09/04/24 1823)     [place] Coma Scale:  No data recorded     CIWA:        Restraint Type:            Isolation Status:  No active isolations

## 2024-09-05 NOTE — ANESTHESIA POSTPROCEDURE EVALUATION
Patient: Darien Camarena    Procedure Summary       Date: 09/05/24 Room / Location: [facility] ENDOSCOPY 2 /  [facility]    Anesthesia Start: 1410 Anesthesia Stop: 1543    Procedures:       ESOPHAGOGASTRODUODENOSCOPY      ENDOSCOPIC RETROGRADE CHOLANGIOPANCREATOGRAPHY Diagnosis:     Surgeons: Anthony Arambula MD Provider: Last Acosta Jr., MD    Anesthesia Type: general ASA Status: 4            Anesthesia Type: general    Vitals  Vitals Value Taken Time   BP     Temp     Pulse 73 09/05/24 1541   Resp     SpO2 90 % 09/05/24 1541   Vitals shown include unfiled device data.        Post Anesthesia Care and Evaluation    Patient location during evaluation: PACU  Patient participation: complete - patient participated  Level of consciousness: sleepy but conscious  Pain management: adequate    Airway patency: patent  Anesthetic complications: No anesthetic complications  PONV Status: none  Cardiovascular status: hemodynamically stable and acceptable  Respiratory status: nonlabored ventilation, acceptable and nasal cannula  Hydration status: acceptable

## 2024-09-05 NOTE — ANESTHESIA PREPROCEDURE EVALUATION
Anesthesia Evaluation     Patient summary reviewed and Nursing notes reviewed   no history of anesthetic complications:   NPO Solid Status: > 8 hours  NPO Liquid Status: > 2 hours           Airway   Mallampati: II  TM distance: >3 FB  Neck ROM: full  No difficulty expected  Dental    (+) edentulous    Pulmonary - normal exam    breath sounds clear to auscultation  (+) lung cancer, COPD,sleep apnea on CPAP  Cardiovascular     ECG reviewed  Rhythm: irregular  Rate: normal    (+) hypertension, dysrhythmias (s/p Watchman) Atrial Fib, CHF (EF 45-50%)     ROS comment: 1/24 Echo:  ·  Left ventricular systolic function is low normal. Calculated left ventricular EF = 47.8% Left ventricular ejection fraction appears to be 46 - 50%.    Neuro/Psych- negative ROS  GI/Hepatic/Renal/Endo    (+) GERD, renal disease- CRI, diabetes mellitus type 2    Musculoskeletal     Abdominal    Substance History      OB/GYN          Other      history of cancer (Lung ca)                  Anesthesia Plan    ASA 4     general     intravenous induction     Anesthetic plan, risks, benefits, and alternatives have been provided, discussed and informed consent has been obtained with: patient.    Plan discussed with CRNA.    CODE STATUS:    Level Of Support Discussed With: Patient  Code Status (Patient has no pulse and is not breathing): CPR (Attempt to Resuscitate)  Medical Interventions (Patient has pulse or is breathing): Full Support

## 2024-09-05 NOTE — CONSULTS
General Surgery Consultation Note    Date of Service: 9/5/2024  Darien Camarena  [id number]  1935      Referring Provider: Clarence Samaniego MD    Location of Consult: Inpatient     Reason for Consultation: Cholelithiasis       History of Present Illness:  I am seeing, Darien Camarena, in consultation for Clarence Samaniego MD regarding cholelithiasis.  88-year-old gentleman presented to the emergency room with fatigue, cough, generalized weakness, intermittent nausea and vomiting, diarrhea, and blood in his stool.  He was noted to be yellowish to his family and was subsequently brought to the emergency room.  He was noted to be in atrial fibrillation with rapid ventricular response and has been on medical therapy for this.  He was noted to have choledocholithiasis and underwent ERCP with sphincterotomy and stone extraction today.  Currently denies abdominal pain, nausea, vomiting, or significant change in bowel habits.  His family was also present at the bedside.    Problems Addressed this Visit          Cardiac and Vasculature    Essential hypertension (Chronic)    Relevant Medications    dilTIAZem (CARDIZEM) injection 10 mg (Completed)    dilTIAZem (CARDIZEM) injection 10 mg (Completed)    dilTIAZem (CARDIZEM) 125 mg in 125 mL D5W infusion    Other Relevant Orders    Miscellaneous DME    Permanent atrial fibrillation    Relevant Medications    dilTIAZem (CARDIZEM) injection 10 mg (Completed)    dilTIAZem (CARDIZEM) injection 10 mg (Completed)    dilTIAZem (CARDIZEM) 125 mg in 125 mL D5W infusion    nitroglycerin (NITROSTAT) SL tablet 0.4 mg    Other Relevant Orders    Miscellaneous DME    Coronary artery disease involving native coronary artery of native heart without angina pectoris    Relevant Medications    dilTIAZem (CARDIZEM) injection 10 mg (Completed)    dilTIAZem (CARDIZEM) injection 10 mg (Completed)    dilTIAZem (CARDIZEM) 125 mg in 125 mL D5W infusion    nitroglycerin (NITROSTAT)  SL tablet 0.4 mg    Other Relevant Orders    Miscellaneous DME    Presence of Watchman left atrial appendage closure device    Relevant Orders    Miscellaneous DME       Endocrine and Metabolic    Type 2 diabetes mellitus with stage 3 chronic kidney disease, without long-term current use of insulin    Relevant Medications    dextrose (GLUTOSE) oral gel 15 g    glucagon (GLUCAGEN) injection 1 mg    insulin regular (humuLIN R,novoLIN R) injection 2-7 Units    Other Relevant Orders    Miscellaneous DME       Gastrointestinal Abdominal     * (Principal) Choledocholithiasis - Primary    Relevant Orders    Miscellaneous DME       Genitourinary and Reproductive     Stage 3 chronic kidney disease    Relevant Orders    Miscellaneous DME       Other    Heart failure with mildly reduced ejection fraction (HFmrEF)    Relevant Medications    dilTIAZem (CARDIZEM) injection 10 mg (Completed)    dilTIAZem (CARDIZEM) injection 10 mg (Completed)    dilTIAZem (CARDIZEM) 125 mg in 125 mL D5W infusion    nitroglycerin (NITROSTAT) SL tablet 0.4 mg    Other Relevant Orders    Miscellaneous DME     Other Visit Diagnoses       Atrial fibrillation with RVR        Relevant Medications    dilTIAZem (CARDIZEM) injection 10 mg (Completed)    dilTIAZem (CARDIZEM) injection 10 mg (Completed)    dilTIAZem (CARDIZEM) 125 mg in 125 mL D5W infusion    nitroglycerin (NITROSTAT) SL tablet 0.4 mg    Dementia, unspecified dementia severity, unspecified dementia type, unspecified whether behavioral, psychotic, or mood disturbance or anxiety        Relevant Orders    Miscellaneous DME    Elevated troponin        Cystitis        Relevant Medications    tamsulosin (FLOMAX) 0.4 MG capsule 24 hr capsule    Dysphagia        Relevant Orders    Tissue Pathology Exam    Esophagitis        Relevant Medications    pantoprazole (PROTONIX) EC tablet 40 mg    Other Relevant Orders    Tissue Pathology Exam          Diagnoses         Codes Comments    Choledocholithiasis     -  Primary ICD-10-CM: K80.50  ICD-9-CM: 574.50     Atrial fibrillation with RVR     ICD-10-CM: I48.91  ICD-9-CM: 427.31     Dementia, unspecified dementia severity, unspecified dementia type, unspecified whether behavioral, psychotic, or mood disturbance or anxiety     ICD-10-CM: F03.90  ICD-9-CM: 294.20     Elevated troponin     ICD-10-CM: R79.89  ICD-9-CM: 790.6     Cystitis     ICD-10-CM: N30.90  ICD-9-CM: 595.9     Type 2 diabetes mellitus with stage 3 chronic kidney disease, without long-term current use of insulin, unspecified whether stage 3a or 3b CKD     ICD-10-CM: E11.22, N18.30  ICD-9-CM: 250.40, 585.3     Essential hypertension     ICD-10-CM: I10  ICD-9-CM: 401.9     Permanent atrial fibrillation     ICD-10-CM: I48.21  ICD-9-CM: 427.31     Stage 3 chronic kidney disease, unspecified whether stage 3a or 3b CKD     ICD-10-CM: N18.30  ICD-9-CM: 585.3     Coronary artery disease involving native coronary artery of native heart without angina pectoris     ICD-10-CM: I25.10  ICD-9-CM: 414.01     Presence of Watchman left atrial appendage closure device     ICD-10-CM: Z95.818  ICD-9-CM: V45.09     Heart failure with mildly reduced ejection fraction (HFmrEF)     ICD-10-CM: I50.22  ICD-9-CM: 428.22     Dysphagia     ICD-10-CM: R13.10  ICD-9-CM: 787.20     Esophagitis     ICD-10-CM: K20.90  ICD-9-CM: 530.10             Past Medical History:   Diagnosis Date    Arrhythmia     Atrial fibrillation     Bilateral leg cramps     possible new medication related side effects    Chronic kidney disease     Clotting disorder     pt doesnt know about this    COPD (chronic obstructive pulmonary disease)     Coronary artery disease     Diabetes mellitus     doesnt check sugar    E. coli sepsis 06/23/2022    Enlarged prostate without lower urinary tract symptoms (luts) 06/20/2016    Full dentures     GERD (gastroesophageal reflux disease)     Gout     Hearing aid worn     bilat prn    History of colonoscopy 09/12/2012     History of radiation therapy 02/24/2023    SBRT LLL lung    [place] (hard of hearing)     hearing aids prn    Hyperlipidemia     Hypertension     Kidney stone     surgery x1    Lung cancer     STEVEN on CPAP     compliant with machine    PAF (paroxysmal atrial fibrillation)     Prostatism     Sleep apnea     CPAP HS    Urinary incontinence     Urinary tract infection     Wears glasses     readers       Past Surgical History:    ATRIAL APPENDAGE EXCLUSION LEFT WITH TRANSESOPHAGEAL ECHOCARDIOGRAM    Procedure: Atrial Appendage Occlusion;  Surgeon: Anthony Mcdaniel MD;  Location:  [facility];  Service: Cardiovascular;  Laterality: N/A;    CARDIAC CATHETERIZATION    Procedure: Left Heart Cath;  Surgeon: Titus Oliveros IV, MD;  Location:  [facility];  Service: Cardiovascular;  Laterality: Left;    CARDIOVERSION    CATARACT EXTRACTION    COLONOSCOPY    CYSTOSCOPY    ENDOSCOPY    possible    KIDNEY STONE SURGERY    x1       Allergies   Allergen Reactions    Xarelto [Rivaroxaban] GI Bleeding     GI bleed    Sglt2 Inhibitors Other (See Comments)     Recurrent UTI    Penicillins Hives     Has tolerated cefepime, ceftriaxone, cefazolin, cefdinir, cefuroxime, cephalexin       No current facility-administered medications on file prior to encounter.     Current Outpatient Medications on File Prior to Encounter   Medication Sig Dispense Refill    allopurinol (ZYLOPRIM) 300 MG tablet Take 1 tablet by mouth Daily. 90 tablet 1    ascorbic acid (VITAMIN C) 1000 MG tablet Take 1 tablet by mouth 2 (Two) Times a Day.      aspirin 81 MG EC tablet Take 1 tablet by mouth Daily. 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. 90 tablet 1    Coenzyme Q10 300 MG capsule Take 1 capsule by mouth Every Night.      donepezil (ARICEPT) 10 MG tablet Take 1 tablet by mouth Every Night. 90 tablet 1    Ferrous Gluconate (IRON) 240 (27 FE) MG tablet Take 1 tablet by mouth Daily.      finasteride (PROSCAR) 5 MG tablet Take 1  tablet by mouth every night at bedtime. 90 tablet 1    furosemide (LASIX) 20 MG tablet Take 1 tablet by mouth Daily. 90 tablet 1    memantine (NAMENDA) 10 MG tablet Take 1 tablet by mouth twice daily 180 tablet 1    metFORMIN (GLUCOPHAGE) 1000 MG tablet Take 1 tablet by mouth 2 (Two) Times a Day. 180 tablet 1    methenamine (HIPREX) 1 g tablet Take 1 tablet by mouth 2 (Two) Times a Day With Meals. 180 tablet 1    metoprolol tartrate (LOPRESSOR) 100 MG tablet Take 1 tablet by mouth 2 (Two) Times a Day. 180 tablet 1    multivitamin with minerals (MULTIVITAMIN MEN 50+ PO) Take 1 tablet by mouth Every Night. Centrum Sliver      omeprazole (priLOSEC) 20 MG capsule Take 1 capsule by mouth once daily 90 capsule 1    Probiotic Product (PROBIOTIC ADVANCED PO) Take 1 tablet by mouth 2 (Two) Times a Day.      sertraline (ZOLOFT) 50 MG tablet Take 1 tablet by mouth once daily 90 tablet 1    tamsulosin (FLOMAX) 0.4 MG capsule 24 hr capsule Take 1 capsule by mouth Daily.      tiotropium bromide-olodaterol (STIOLTO RESPIMAT) 2.5-2.5 MCG/ACT aerosol solution inhaler Inhale 2 puffs Daily. 2 inh once a day 3 each 3    albuterol sulfate  (90 Base) MCG/ACT inhaler Inhale 2 puffs Every 4 (Four) Hours As Needed for Wheezing. 54 g 1    docusate sodium (COLACE) 100 MG capsule Take 2 capsules by mouth At Night As Needed for Constipation.      pravastatin (PRAVACHOL) 40 MG tablet Take 1 tablet by mouth once daily 90 tablet 0    [DISCONTINUED] metOLazone (ZAROXOLYN) 2.5 MG tablet Take 1 tablet by mouth 2 (Two) Times a Week. Take Tuesday and Friday with furosemide 10 tablet 1         Current Facility-Administered Medications:     acetaminophen (TYLENOL) tablet 650 mg, 650 mg, Oral, Q4H PRN **OR** acetaminophen (TYLENOL) 160 MG/5ML oral solution 650 mg, 650 mg, Oral, Q4H PRN **OR** acetaminophen (TYLENOL) suppository 650 mg, 650 mg, Rectal, Q4H PRN, Anthony Arambula MD    ipratropium (ATROVENT) nebulizer solution 0.5 mg, 0.5 mg,  Nebulization, 4x Daily - RT, 0.5 mg at 09/05/24 0859 **AND** arformoterol (BROVANA) nebulizer solution 15 mcg, 15 mcg, Nebulization, BID - RT, Anthony Arambula MD, 15 mcg at 09/05/24 0859    aspirin chewable tablet 81 mg, 81 mg, Oral, Daily, Anthony Arambula MD, 81 mg at 09/05/24 1746    sennosides-docusate (PERICOLACE) 8.6-50 MG per tablet 2 tablet, 2 tablet, Oral, BID PRN **AND** polyethylene glycol (MIRALAX) packet 17 g, 17 g, Oral, Daily PRN **AND** bisacodyl (DULCOLAX) EC tablet 5 mg, 5 mg, Oral, Daily PRN **AND** bisacodyl (DULCOLAX) suppository 10 mg, 10 mg, Rectal, Daily PRN, Anthony Arambula MD    Calcium Replacement - Follow Nurse / BPA Driven Protocol, , Does not apply, PRN, Anthony Arambula MD    [START ON 9/6/2024] cefTRIAXone (ROCEPHIN) 2,000 mg in sodium chloride 0.9 % 100 mL MBP, 2,000 mg, Intravenous, Q24H, Clarence Samaniego MD    dextrose (D50W) (25 g/50 mL) IV injection 25 g, 25 g, Intravenous, Q15 Min PRN, Anthony Arambula MD    dextrose (GLUTOSE) oral gel 15 g, 15 g, Oral, Q15 Min PRN, Anthony Arambula MD    dilTIAZem (CARDIZEM) 125 mg in 125 mL D5W infusion, 5-15 mg/hr, Intravenous, Titrated, Anthony Arambula MD, Last Rate: 15 mL/hr at 09/05/24 1732, 15 mg/hr at 09/05/24 1732    glucagon (GLUCAGEN) injection 1 mg, 1 mg, Intramuscular, Q15 Min PRN, Anthony Arambula MD    insulin regular (humuLIN R,novoLIN R) injection 2-7 Units, 2-7 Units, Subcutaneous, Q6H, Anthony Arambula MD    ipratropium-albuterol (DUO-NEB) nebulizer solution 3 mL, 3 mL, Nebulization, Q4H PRN, Anthony Arambula MD    Magnesium Standard Dose Replacement - Follow Nurse / BPA Driven Protocol, , Does not apply, PRN, Anthony Arambula MD    metroNIDAZOLE (FLAGYL) IVPB 500 mg, 500 mg, Intravenous, Q8H, Clarence Samaniego MD    nitroglycerin (NITROSTAT) SL tablet 0.4 mg, 0.4 mg, Sublingual, Q5 Min PRN, Anthony Arambula MD    pantoprazole (PROTONIX) EC tablet 40 mg, 40 mg, Oral, BID AC, Anthony Arambula MD    Phosphorus Replacement - Follow Nurse /  BPA Driven Protocol, , Does not apply, Ralf KENNEDY John A, MD    Potassium Replacement - Follow Nurse / BPA Driven Protocol, , Does not apply, Ralf KENNEDY John A, MD    sodium chloride 0.9 % flush 10 mL, 10 mL, Intravenous, PRRalf SUAREZ John A, MD    sodium chloride 0.9 % flush 10 mL, 10 mL, Intravenous, Q12H, Anthony Arambula MD    sodium chloride 0.9 % flush 10 mL, 10 mL, Intravenous, PRRalf SUAREZ John A, MD    sodium chloride 0.9 % infusion 40 mL, 40 mL, Intravenous, PRRalf SUAREZ John A, MD    Family History   Problem Relation Age of Onset    Alzheimer's disease Mother     COPD Father     Lung cancer Father     Cancer Father     Kidney disease Father     No Known Problems Daughter     No Known Problems Daughter     No Known Problems Daughter      Social History     Socioeconomic History    Marital status:    Tobacco Use    Smoking status: Former     Current packs/day: 0.00     Average packs/day: 1 pack/day for 15.0 years (15.0 ttl pk-yrs)     Types: Cigarettes     Start date: 1947     Quit date: 1960     Years since quittin.3     Passive exposure: Past    Smokeless tobacco: Former     Types: Chew     Quit date:     Tobacco comments:     started at 13 yo.   Vaping Use    Vaping status: Never Used    Passive vaping exposure: Yes   Substance and Sexual Activity    Alcohol use: No    Drug use: Never    Sexual activity: Not Currently     Partners: Female       Review of Systems:  Review of Systems   Constitutional:  Negative for chills and fever.   HENT:  Positive for hearing loss. Negative for congestion and sinus pain.    Eyes:  Negative for photophobia and visual disturbance.   Respiratory:  Negative for apnea and stridor.    Cardiovascular:  Negative for chest pain and leg swelling.   Gastrointestinal:  Positive for nausea and vomiting. Negative for abdominal distention.   Endocrine: Negative for polyphagia and polyuria.   Genitourinary:  Negative for dysuria, hematuria and urgency.  "  Musculoskeletal:  Negative for arthralgias and joint swelling.   Skin:  Negative for color change and rash.   Allergic/Immunologic: Negative for immunocompromised state.   Neurological:  Negative for dizziness and syncope.   Hematological:  Negative for adenopathy.   Psychiatric/Behavioral:  Negative for agitation and sleep disturbance. The patient is not hyperactive.      Otherwise the 12 point review of systems is negative.    /86   Pulse 113   Temp 98.1 °F (36.7 °C) (Axillary)   Resp 16   Ht 190.5 cm (75\")   Wt 103 kg (226 lb 6.6 oz)   SpO2 91%   BMI 28.30 kg/m²   Body mass index is 28.3 kg/m².    General: Pleasantly conversant  HEENT: PER, icteric, normal sclerae  Cardiac: regular rhythm,  no audible rubs  Pulmonary: bilateral breath sounds, nonlabored  Abdominal: Soft, no generalized peritonitis, no palpable hepatosplenomegaly  Neurologic: awake, alert, no obvious focal deficits  Extremities: warm, no edema  Skin: no obvious rashes nor worrisome lesions seen     CBC  Results from last 7 days   Lab Units 09/05/24  0713   WBC 10*3/mm3 5.84   HEMOGLOBIN g/dL 13.3   HEMATOCRIT % 40.6   PLATELETS 10*3/mm3 146       CMP  Results from last 7 days   Lab Units 09/05/24  0713 09/04/24  1821   SODIUM mmol/L 139 137   POTASSIUM mmol/L 3.2* 3.6   CHLORIDE mmol/L 101 100   CO2 mmol/L 27.0 27.7   BUN mg/dL 28* 32*   CREATININE mg/dL 0.84 1.09   CALCIUM mg/dL 8.6 9.0   BILIRUBIN mg/dL  --  8.1*   ALK PHOS U/L  --  423*   ALT (SGPT) U/L  --  65*   AST (SGOT) U/L  --  74*   GLUCOSE mg/dL 82 112*       Radiology  Imaging Results (Last 72 Hours)       Procedure Component Value Units Date/Time    FL ERCP pancreatic and biliary ducts [[id number]] Collected: 09/05/24 1551     Updated: 09/05/24 1628    Narrative:      FL ERCP PANCREATIC AND BILIARY DUCTS    Date of Exam: 9/5/2024 2:17 PM EDT    Indication: ENDOSCOPIC RETROGRADE CHOLANGIOPANCREATOGRAPHY.       Comparison: None available.    Technique:  A series of " radiographic digital spot films were obtained in conjunction with an endoscopic catheterization of the biliary and pancreatic ductal system, performed by the gastroenterologist.    Fluoroscopic Time: 2 minutes 48 seconds    Number of Images: 11    Findings:  Dictation is to record 2 minutes and 48 seconds of fluoroscopy time. A total of 11 images obtained show endoscope with side cannula placement, contrast injection of the common duct, apparent balloon sweep, and subsequent plastic stent placement. Please   see the procedure report for full details.      Impression:      Impression:  Fluoroscopy provided during ERCP and stent placement.      Electronically Signed: Oliverio Younger MD    9/5/2024 4:25 PM EDT    Workstation ID: [id number]    MRI abdomen wo contrast mrcp [[id number]] Collected: 09/05/24 0515     Updated: 09/05/24 0522    Narrative:      MRI ABDOMEN WO CONTRAST MRCP    Date of Exam: 9/5/2024 4:42 AM EDT    Indication: elevated LFTS, calculi within the distal common bile duct.     Comparison: CT abdomen pelvis dated September 4, 2024    Technique:  Routine multiplanar/multisequence images of the abdomen were obtained with MRCP sequences without contrast administration.      Findings:  Motion artifact degrades the image quality of this examination.    There are small bilateral pleural effusions with areas of basilar atelectasis better described and seen on CT scan. Within the limitations of motion artifact, the pancreas is normal. The liver parenchyma is unremarkable. There are cysts of the left   kidney. The right kidney is normal.    The gallbladder is mildly distended but there is no evidence of wall thickening or pericholecystic fluid. The common bile duct is nondilated. However, there are several small filling defects in the distal common bile duct consistent with nonobstructing   choledocholithiasis. The largest stone is about 5 mm. The maximum common bile duct diameter is about 9 mm.      Impression:       Impression:  Choledocholithiasis with several small stones in the distal common bile duct. The largest stone is about 5 mm. The common bile duct is not dilated.        Electronically Signed: Timothy Willoughby MD    9/5/2024 5:19 AM EDT    Workstation ID: [id number]    CT Abdomen Pelvis With Contrast [[id number]] Collected: 09/04/24 1955     Updated: 09/04/24 2009    Narrative:      CT ANGIOGRAM CHEST PULMONARY EMBOLISM, CT ABDOMEN PELVIS W CONTRAST    Date of Exam: 9/4/2024 7:42 PM EDT    Indication: soa.    Comparison: 4/15/2024    Technique: Axial CT images were obtained of the chest and CT of the abdomen and pelvis after the uneventful intravenous administration of 86 cc Isovue-370 IV contrast utilizing pulmonary embolism protocol.  Reconstructed coronal and sagittal images were   also obtained. Automated exposure control and iterative construction methods were used.    Findings: The thyroid gland is normal. The subglottic airway is clear. No aortic dissection. 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 No pulmonary embolus. Small right and trace left pleural effusions. No pericardial effusion.    Liver: 0.8 cm right hepatic cyst.    Spleen:No masses. No perisplenic hematoma.    Pancreas:No pancreatic masses. No evidence of pancreatitis.    Gallbladder and common bile duct: Dilated gallbladder with extrahepatic biliary ductal dilatation. There appears to be material possibly noncalcified calculus within the still common bile duct resulting in partial obstruction.    Adrenal glands:No adrenal masses    Kidneys and ureters: Exophytic 2.3 cm left renal cyst. Exophytic 2 cm left renal cyst. Prominent left extrarenal pelvis and hydroureter on a chronic basis extending to the urinary bladder.     Urinary bladder:No urinary bladder wall thickening. No bladder masses.    Small bowel:Normal caliber small bowel.    Large bowel:No diverticulosis or diverticulitis. No large bowel masses are appreciated    Appendix: Normal  appendix.    GENITOURINARY: Normal prostate    Ascites or pneumoperitoneum:None.    Adenopathy:None present    Osseous structures: Degenerative changes of the hips. The proximal femurs are intact. The pubic bones are intact. The sacroiliac joints are normal. Multilevel degenerative changes of the lumbar spine.    Other findings: None      Impression:      1. There appears to be debris or noncalcified calculi within the distal common bile duct. No evidence of cholecystitis at this time.  2. No pulmonary embolus.  3. Small right and trace left pleural effusions.  4. 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 Cardiology consult recommended.            Electronically Signed: Yoni Rios MD    9/4/2024 8:06 PM EDT    Workstation ID: [id number]    CT Angiogram Chest Pulmonary Embolism [[id number]] Collected: 09/04/24 1955     Updated: 09/04/24 2009    Narrative:      CT ANGIOGRAM CHEST PULMONARY EMBOLISM, CT ABDOMEN PELVIS W CONTRAST    Date of Exam: 9/4/2024 7:42 PM EDT    Indication: soa.    Comparison: 4/15/2024    Technique: Axial CT images were obtained of the chest and CT of the abdomen and pelvis after the uneventful intravenous administration of 86 cc Isovue-370 IV contrast utilizing pulmonary embolism protocol.  Reconstructed coronal and sagittal images were   also obtained. Automated exposure control and iterative construction methods were used.    Findings: The thyroid gland is normal. The subglottic airway is clear. No aortic dissection. 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 No pulmonary embolus. Small right and trace left pleural effusions. No pericardial effusion.    Liver: 0.8 cm right hepatic cyst.    Spleen:No masses. No perisplenic hematoma.    Pancreas:No pancreatic masses. No evidence of pancreatitis.    Gallbladder and common bile duct: Dilated gallbladder with extrahepatic biliary ductal dilatation. There appears to be material possibly noncalcified calculus within the still  common bile duct resulting in partial obstruction.    Adrenal glands:No adrenal masses    Kidneys and ureters: Exophytic 2.3 cm left renal cyst. Exophytic 2 cm left renal cyst. Prominent left extrarenal pelvis and hydroureter on a chronic basis extending to the urinary bladder.     Urinary bladder:No urinary bladder wall thickening. No bladder masses.    Small bowel:Normal caliber small bowel.    Large bowel:No diverticulosis or diverticulitis. No large bowel masses are appreciated    Appendix: Normal appendix.    GENITOURINARY: Normal prostate    Ascites or pneumoperitoneum:None.    Adenopathy:None present    Osseous structures: Degenerative changes of the hips. The proximal femurs are intact. The pubic bones are intact. The sacroiliac joints are normal. Multilevel degenerative changes of the lumbar spine.    Other findings: None      Impression:      1. There appears to be debris or noncalcified calculi within the distal common bile duct. No evidence of cholecystitis at this time.  2. No pulmonary embolus.  3. Small right and trace left pleural effusions.  4. 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 Cardiology consult recommended.            Electronically Signed: Yoni Rios MD    9/4/2024 8:06 PM EDT    Workstation ID: [id number]    CT Head Without Contrast [[id number]] Collected: 09/04/24 1953     Updated: 09/04/24 2006    Narrative:      CT HEAD WO CONTRAST    Date of Exam: 9/4/2024 7:36 PM EDT    Indication: confusion.    Comparison: MRI brain 11/13/2023    Technique: Axial CT images were obtained of the head without contrast administration.  Automated exposure control and iterative construction methods were used.      Findings:  Negative for large territory loss of gray-white differentiation, acute intracranial hemorrhage, large parenchymal mass, midline shift or hydrocephalus. Stable incidental benign left temporal arachnoid cyst. Mild global parenchymal volume loss stable from   prior. Mild  periventricular and subcortical hypodensity suggesting chronic microvascular ischemic change stable from prior. No large extra-axial fluid collection. Symmetric appearing globes. Distal carotid and vertebral artery atherosclerotic plaque.   Layering fluid in the right maxillary sinus with asymmetric mucoperiosteal thickening. This could reflect acute on chronic sinusitis in the right clinical setting. No large mastoid effusion. Negative for depressed skull fracture. Degenerative changes at   the dens.      Impression:      Impression:  1. No acute intracranial findings by CT. Chronic findings detailed above similar to previous MRI brain comparison.  2. Possible acute on chronic sinusitis in the right clinical setting, with layering fluid noted in the right maxillary sinus.        Electronically Signed: Compa Dominguez MD    9/4/2024 7:56 PM EDT    Workstation ID: [id number]    XR Chest 1 View [[id number]] Collected: 09/04/24 1812     Updated: 09/04/24 1816    Narrative:      XR CHEST 1 VW    Date of Exam: 9/4/2024 5:55 PM EDT    Indication: Weak/Dizzy/AMS triage protocol    Comparison: Chest radiograph 6/3/2024    Findings:  Cardiomediastinal silhouette unchanged from prior. Platelike region of atelectasis versus scar left lower lobe similar to prior. Slight elevated left hemidiaphragm similar to prior. No new consolidation, edema, effusion or pneumothorax. Degenerative   related osseous changes. Stable radiographic appearance of the chest since 6/3/2024.      Impression:      Impression:  No acute radiographic findings. Stable appearance of the chest since 6/3/2024 comparison.      Electronically Signed: Compa Dominguez MD    9/4/2024 6:13 PM EDT    Workstation ID: [id number]          Assessment:  Choledocholithiasis status post ERCP  Cholelithiasis   Hypertension  Diabetes mellitus  Atrial fibrillation  Stage III chronic kidney disease  Coronary artery disease    Plan:  Noted to be jaundice with elevated liver function  tests.  Underwent ERCP with stone extraction and sphincterotomy today.  I reviewed his labs, prior imaging, and ERCP images - patent cystic duct.  Most recent T. bili 8.1.  No evidence of acute cholecystitis on CT nor MRI.  Secondary to his age and comorbidities this may be all that he needs currently.  May advance diet as tolerated will follow along.  No plans for acute surgical intervention.    Rancho Fink MD  09/05/24  17:54 EDT

## 2024-09-05 NOTE — PLAN OF CARE
Problem: Adult Inpatient Plan of Care  Goal: Plan of Care Review  Outcome: Ongoing, Progressing  Flowsheets (Taken 9/5/2024 1816)  Progress: improving  Plan of Care Reviewed With:   patient   family  Goal: Patient-Specific Goal (Individualized)  Outcome: Ongoing, Progressing  Goal: Absence of Hospital-Acquired Illness or Injury  Outcome: Ongoing, Progressing  Intervention: Identify and Manage Fall Risk  Recent Flowsheet Documentation  Taken 9/5/2024 1655 by Kassidy Heart RN  Safety Promotion/Fall Prevention:   activity supervised   assistive device/personal items within reach   clutter free environment maintained   fall prevention program maintained   nonskid shoes/slippers when out of bed   room organization consistent   safety round/check completed  Taken 9/5/2024 1436 by Kassidy Heart RN  Safety Promotion/Fall Prevention: patient off unit  Taken 9/5/2024 1400 by Kassidy Heart RN  Safety Promotion/Fall Prevention: patient off unit  Taken 9/5/2024 1347 by Kassidy Heart RN  Safety Promotion/Fall Prevention: patient off unit  Taken 9/5/2024 1239 by Kassidy Heart RN  Safety Promotion/Fall Prevention: patient off unit  Taken 9/5/2024 1200 by Kassidy Heart RN  Safety Promotion/Fall Prevention: patient off unit  Taken 9/5/2024 1156 by Kassidy Heart RN  Safety Promotion/Fall Prevention: patient off unit  Taken 9/5/2024 1146 by Kassidy Heart RN  Safety Promotion/Fall Prevention: patient off unit  Taken 9/5/2024 1103 by Kassidy Heart RN  Safety Promotion/Fall Prevention: patient off unit  Taken 9/5/2024 0957 by Kassidy Heart RN  Safety Promotion/Fall Prevention: (to Endo for ERCP) patient off unit  Taken 9/5/2024 0955 by Kassidy Heart RN  Safety Promotion/Fall Prevention:   activity supervised   assistive device/personal items within reach   clutter free environment maintained   fall prevention program maintained   nonskid shoes/slippers when out of bed   room organization consistent   safety round/check  completed  Taken 9/5/2024 0745 by Kassidy Heart RN  Safety Promotion/Fall Prevention:   activity supervised   assistive device/personal items within reach   clutter free environment maintained   fall prevention program maintained   nonskid shoes/slippers when out of bed   room organization consistent   safety round/check completed  Intervention: Prevent Skin Injury  Recent Flowsheet Documentation  Taken 9/5/2024 1655 by Kassidy Heart RN  Body Position:   neutral body alignment   neutral head position  Skin Protection:   adhesive use limited   incontinence pads utilized   protective footwear used   tubing/devices free from skin contact  Taken 9/5/2024 0955 by Kassidy Heart RN  Body Position:   position changed independently   neutral body alignment   neutral head position  Skin Protection:   adhesive use limited   incontinence pads utilized   protective footwear used   tubing/devices free from skin contact  Taken 9/5/2024 0745 by Kassidy Heart RN  Body Position:   position changed independently   neutral body alignment   neutral head position  Skin Protection:   adhesive use limited   incontinence pads utilized   protective footwear used   tubing/devices free from skin contact  Intervention: Prevent and Manage VTE (Venous Thromboembolism) Risk  Recent Flowsheet Documentation  Taken 9/5/2024 1655 by Kassidy Heart RN  Activity Management: back to bed  VTE Prevention/Management: other (see comments)  Taken 9/5/2024 0955 by Kassidy Heart RN  Activity Management: activity minimized  VTE Prevention/Management: (aspirin therapy ordered) other (see comments)  Taken 9/5/2024 0745 by Kassidy Heart RN  Activity Management: activity minimized  Intervention: Prevent Infection  Recent Flowsheet Documentation  Taken 9/5/2024 1655 by Kassidy Heart RN  Infection Prevention:   environmental surveillance performed   equipment surfaces disinfected   hand hygiene promoted   rest/sleep promoted   single patient room provided    personal protective equipment utilized  Taken 9/5/2024 0955 by Kassidy Heart RN  Infection Prevention:   environmental surveillance performed   equipment surfaces disinfected   hand hygiene promoted   rest/sleep promoted   single patient room provided  Taken 9/5/2024 0745 by Kassidy Heart RN  Infection Prevention:   environmental surveillance performed   equipment surfaces disinfected   hand hygiene promoted   rest/sleep promoted   single patient room provided  Goal: Optimal Comfort and Wellbeing  Outcome: Ongoing, Progressing  Intervention: Monitor Pain and Promote Comfort  Recent Flowsheet Documentation  Taken 9/5/2024 1655 by Kassidy Heart RN  Pain Management Interventions:   pain management plan reviewed with patient/caregiver   pillow support provided   position adjusted  Taken 9/5/2024 0955 by Kassidy Heart RN  Pain Management Interventions: quiet environment facilitated  Taken 9/5/2024 0745 by Kassidy Heart RN  Pain Management Interventions: pain management plan reviewed with patient/caregiver  Intervention: Provide Person-Centered Care  Recent Flowsheet Documentation  Taken 9/5/2024 1655 by Kassidy Heart RN  Trust Relationship/Rapport:   care explained   choices provided   emotional support provided   empathic listening provided   questions answered   questions encouraged   reassurance provided   thoughts/feelings acknowledged  Taken 9/5/2024 0955 by Kassidy Heart RN  Trust Relationship/Rapport:   care explained   choices provided   emotional support provided   empathic listening provided   questions answered   questions encouraged   reassurance provided   thoughts/feelings acknowledged  Taken 9/5/2024 0745 by Kassidy Heart RN  Trust Relationship/Rapport:   care explained   choices provided   emotional support provided   empathic listening provided   questions encouraged   questions answered   reassurance provided   thoughts/feelings acknowledged  Goal: Readiness for Transition of Care  Outcome:  Ongoing, Progressing   Goal Outcome Evaluation:  Plan of Care Reviewed With: patient, family        Progress: improving

## 2024-09-05 NOTE — PROGRESS NOTES
Cardiology note:    Full consult to follow.  However, patient is 88-year-old gentleman well-known to me with history of mild coronary artery disease on heart catheterization in 2022 and heart failure with mildly reduced ejection fraction with LVEF 48% on recent echo.  He is admitted for symptomatic choledocholithiasis but will likely need cholecystectomy.    Patient is at acceptable cardiovascular risk to proceed with surgery.  Patient on no anticoagulation to be held.    RYANNE Oliveros MD [facility], [facility]  Interventional and General Cardiology

## 2024-09-05 NOTE — CONSULTS
Inpatient Cardiology Consult  Consult performed by: Blanquita Ansari APRN  Consult ordered by: Evangelina Braun APRN          [facility]  Cardiovascular Consultation Note    Reason for consult cardiovascular clearance, atrial fibrillation with RVR     Patient is an 88-year-old gentleman with a history of mild CAD, systolic heart failure, permanent atrial fibrillation, stage III chronic kidney disease, type 2 diabetes mellitus, hypertension, hyperlipidemia who we are being asked to consult for atrial fibrillation with RVR as well as cardiovascular clearance for possible intervention on his choledocholithiasis and gallstones in the common bile duct.  The patient was transferred from outside facility after presenting with jaundice, nausea, vomiting and abdominal discomfort.  MRI of his abdomen showed choledocholithiasis with several small stones in the distal common bile duct and surgery consult is pending.  On arrival his EKG showed atrial fibrillation with RVR and he was started on IV Cardizem drip.  The patient ha known permanent atrial fibrillation and is not on anticoagulation due to history of GI bleeds.  He had a Watchman device placed in November 2023.  His 45-day surveillance in January showed well-seated device.  The patient is normally rate controlled on his metoprolol at 100 twice daily.  He had a heart catheterization in 2022 that showed mild CAD.  He had echocardiogram in January that showed mildly reduced LVEF of 46 to 50%.  The patient has not been having any chest pain or shortness of breath.  He is currently lying flat in bed breathing easy on O2 2 L by nasal cannula.    Cardiac risk factors: Advanced age, type 2 diabetes mellitus, hypertension, hyperlipidemia, obesity    Past medical and surgical history, social and family history reviewed in EMR.    REVIEW OF SYSTEMS:   H&P ROS reviewed and pertinent CV ROS as noted in HPI.         Vital Sign Min/Max for  last 24 hours  Temp  Min: 97.8 °F (36.6 °C)  Max: 98.1 °F (36.7 °C)   BP  Min: 111/82  Max: 143/103   Pulse  Min: 96  Max: 126   Resp  Min: 16  Max: 18   SpO2  Min: 87 %  Max: 99 %   No data recorded      Intake/Output Summary (Last 24 hours) at 2024 0803  Last data filed at 2024 0331  Gross per 24 hour   Intake 100 ml   Output 600 ml   Net -500 ml           Constitutional:       Appearance: Not in distress.   HENT:      Head:      Comments: Hard of hearing  Pulmonary:      Effort: Pulmonary effort is normal.      Breath sounds: Normal breath sounds.   Cardiovascular:      Normal rate. Irregularly irregular rhythm.      Murmurs: There is no murmur.   Pulses:     Intact distal pulses.   Edema:     Peripheral edema absent.   Skin:     General: Skin is warm and dry.      Coloration: Skin is jaundiced.   Neurological:      Mental Status: Alert and oriented to person, place and time.          EK2024 atrial fibrillation with RVR at 113 bpm    Lab Review:   Labs reviewed in the electronic medical record.  Pertinent findings include:  Lab Results   Component Value Date    GLUCOSE 82 2024    BUN 28 (H) 2024    CREATININE 0.84 2024     2024    K 3.2 (L) 2024     2024    CALCIUM 8.6 2024    PROTEINTOT 6.4 2024    ALBUMIN 3.1 (L) 2024    ALT 65 (H) 2024    AST 74 (H) 2024    ALKPHOS 423 (H) 2024    BILITOT 8.1 (H) 2024    GLOB 3.3 2024    AGRATIO 0.9 2024    BCR 33.3 (H) 2024    ANIONGAP 11.0 2024    EGFR 84.4 2024     Lab Results   Component Value Date    WBC 5.84 2024    HGB 13.3 2024    HCT 40.6 2024    MCV 90.2 2024     2024     Lab Results   Component Value Date    CHOL 75 2024    TRIG 83 2024    HDL 26 (L) 2024    LDL 32 2024                Active Hospital Problems    Diagnosis     **Choledocholithiasis     Heart failure with  mildly reduced ejection fraction (HFmrEF)      Cardiac catheterization (2022): Mild CAD. Normal LV filling pressure  Echo (8/8/2022): EF 50%.  Anatomically and functionally normal valves  FARHAD (1/12/2024): LVEF 40%.  27 mm Watchman device well-seated.  No thrombus or periprosthetic flow.  Mild MR but no significant valvular abnormality  Echo (1/27/2024): LVEF 48%      Coronary artery disease involving native coronary artery of native heart without angina pectoris      Cardiac catheterization (9/29/2022): Mild CAD.  Normal LV filling pressure      Stage 3 chronic kidney disease     Permanent atrial fibrillation      Diagnosed 2012.   FARHAD-guided ECV, 8/17/2012  CHADS-VASc 5 (age > 75, CAD, HTN, DM)  Multiple cardioversions, 2018, 2019, 2020, 2021  Unsuccessful cardioversion with conversion to rate control strategy, 2023  Echo (8/8/2022): EF 50%, anatomically and functionally normal valves  Watchman implant by Anthony Mcdaniel, 11/28/2023  FARHAD (1/12/2024): LVEF 40%.  27 mm Watchman device well-seated.  No thrombus or periprosthetic flow.   Limited TTE (1/27/2024): LVEF 46 to 50%        Type 2 diabetes mellitus with stage 3 chronic kidney disease, without long-term current use of insulin     Presence of Watchman left atrial appendage closure device      Plavix discontinued 5/2024.      Hyperlipidemia LDL goal <100      Moderate intensity statin therapy reasonable due to diabetic status      Essential hypertension      Target blood pressure <130/80 mmHg               Continue IV Cardizem drip for rate control of atrial fibrillation until able to take p.o. then will transition to home dose of metoprolol  Start aspirin 81 mg daily when able to take p.o. patient currently not on chronic anticoagulation due to Watchman device  Patient is of acceptable cardiovascular risk to proceed with any intervention and deemed necessary for his choledocholithiasis  Will continue to follow      BERYL Paiz

## 2024-09-05 NOTE — CONSULTS
[facility] Consult    Referring Provider: Clarence Samaniego MD     PCP: Pito Way MD    Reason for Consultation: Choledocholithiasis     Chief complaint: Nausea and vomiting     History of present illness:    Darien Camarena is a 88 y.o. male who is admitted with nausea and vomiting.  He states he developed acute nausea and vomiting on Sunday, 9/1.  He had some loose stools, generalized weakness and fatigue.   He denies abdominal pain, fever nor chills.     His daughter noticed he appeared jaundiced and thus brought him to the ER.   Labs showed elevated LFTs in cholestatic pattern with Alk phos of 423, AST 74, ALT 65 and total bilirubin 8.1.   Contrasted CT showed debris in the distal common bile duct.   Subsequent noncontrast MRCP showed choledocholithiasis with several stones in the distal common bile duct.   Common bile duct is 9 mm.       Allergies:  Xarelto [rivaroxaban], Sglt2 inhibitors, and Penicillins    Scheduled Meds:  ipratropium, 0.5 mg, Nebulization, 4x Daily - RT   And  arformoterol, 15 mcg, Nebulization, BID - RT  aspirin, 81 mg, Oral, Daily  insulin regular, 2-7 Units, Subcutaneous, Q6H  sodium chloride, 10 mL, Intravenous, Q12H         Infusions:  dilTIAZem, 5-15 mg/hr, Last Rate: 15 mg/hr (09/05/24 0805)  sodium chloride, 75 mL/hr, Last Rate: 75 mL/hr (09/05/24 0805)        PRN Meds:    acetaminophen **OR** acetaminophen **OR** acetaminophen    senna-docusate sodium **AND** polyethylene glycol **AND** bisacodyl **AND** bisacodyl    Calcium Replacement - Follow Nurse / BPA Driven Protocol    dextrose    dextrose    glucagon (human recombinant)    ipratropium-albuterol    ipratropium-albuterol    Magnesium Standard Dose Replacement - Follow Nurse / BPA Driven Protocol    nitroglycerin    ondansetron    Phosphorus Replacement - Follow Nurse / BPA Driven Protocol    Potassium Replacement - Follow Nurse / BPA Driven Protocol    sodium chloride    sodium chloride    sodium  chloride    Home Meds:  Medications Prior to Admission   Medication Sig Dispense Refill Last Dose    allopurinol (ZYLOPRIM) 300 MG tablet Take 1 tablet by mouth Daily. 90 tablet 1 9/4/2024 at 1700    ascorbic acid (VITAMIN C) 1000 MG tablet Take 1 tablet by mouth 2 (Two) Times a Day.   9/4/2024 at 1700    aspirin 81 MG EC tablet Take 1 tablet by mouth Daily. 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. 90 tablet 1 9/4/2024 at 0500    Coenzyme Q10 300 MG capsule Take 1 capsule by mouth Every Night.   9/4/2024 at 1700    donepezil (ARICEPT) 10 MG tablet Take 1 tablet by mouth Every Night. 90 tablet 1 9/4/2024 at 1700    finasteride (PROSCAR) 5 MG tablet Take 1 tablet by mouth every night at bedtime. 90 tablet 1 9/4/2024 at 1700    furosemide (LASIX) 20 MG tablet Take 1 tablet by mouth Daily. 90 tablet 1 9/4/2024 at 0500    memantine (NAMENDA) 10 MG tablet Take 1 tablet by mouth twice daily 180 tablet 1 9/4/2024 at 0500    metFORMIN (GLUCOPHAGE) 1000 MG tablet Take 1 tablet by mouth 2 (Two) Times a Day. 180 tablet 1 9/4/2024 at 0500    methenamine (HIPREX) 1 g tablet Take 1 tablet by mouth 2 (Two) Times a Day With Meals. 180 tablet 1 9/4/2024 at 0500    metOLazone (ZAROXOLYN) 2.5 MG tablet Take 1 tablet by mouth 2 (Two) Times a Week. Take Tuesday and Friday with furosemide 10 tablet 1 9/4/2024 at 0500    metoprolol tartrate (LOPRESSOR) 100 MG tablet Take 1 tablet by mouth 2 (Two) Times a Day. 180 tablet 1 9/4/2024 at 1700    multivitamin with minerals (MULTIVITAMIN MEN 50+ PO) Take 1 tablet by mouth Every Night. Centrum Sliver   9/4/2024 at 1700    omeprazole (priLOSEC) 20 MG capsule Take 1 capsule by mouth once daily 90 capsule 1 9/4/2024 at 0500    Probiotic Product (PROBIOTIC ADVANCED PO) Take 1 tablet by mouth 2 (Two) Times a Day.   9/4/2024 at 1700    sertraline (ZOLOFT) 50 MG tablet Take 1 tablet by mouth once daily 90 tablet 1 9/4/2024 at 0500    tamsulosin (FLOMAX) 0.4 MG capsule 24 hr capsule Take 1 capsule by  mouth Daily.   9/4/2024 at 0500    tiotropium bromide-olodaterol (STIOLTO RESPIMAT) 2.5-2.5 MCG/ACT aerosol solution inhaler Inhale 2 puffs Daily. 2 inh once a day 3 each 3 9/4/2024 at 0500    albuterol sulfate  (90 Base) MCG/ACT inhaler Inhale 2 puffs Every 4 (Four) Hours As Needed for Wheezing. 54 g 1 More than a month    docusate sodium (COLACE) 100 MG capsule Take 2 capsules by mouth At Night As Needed for Constipation.   More than a month    Ferrous Gluconate (IRON) 240 (27 FE) MG tablet Take 1 tablet by mouth Daily.       pravastatin (PRAVACHOL) 40 MG tablet Take 1 tablet by mouth once daily 90 tablet 0        ROS: Review of Systems   Constitutional:  Positive for fatigue.   Eyes: Negative.    Respiratory: Negative.     Cardiovascular: Negative.    Gastrointestinal:  Positive for nausea and vomiting. Negative for abdominal pain.   Endocrine: Negative.    Skin:  Positive for color change.       PAST MED HX:  Past Medical History:   Diagnosis Date    Arrhythmia     Atrial fibrillation     Bilateral leg cramps     possible new medication related side effects    Chronic kidney disease     Clotting disorder     pt doesnt know about this    COPD (chronic obstructive pulmonary disease)     Coronary artery disease     Diabetes mellitus     doesnt check sugar    E. coli sepsis 06/23/2022    Enlarged prostate without lower urinary tract symptoms (luts) 06/20/2016    Full dentures     GERD (gastroesophageal reflux disease)     Gout     Hearing aid worn     bilat prn    History of colonoscopy 09/12/2012    History of radiation therapy 02/24/2023    SBRT LLL lung    Inupiat (hard of hearing)     hearing aids prn    Hyperlipidemia     Hypertension     Kidney stone     surgery x1    Lung cancer     STEVEN on CPAP     compliant with machine    PAF (paroxysmal atrial fibrillation)     Prostatism     Sleep apnea     CPAP HS    Urinary incontinence     Urinary tract infection     Wears glasses     readers       PAST SURG  "HX:  Past Surgical History:   Procedure Laterality Date    ATRIAL APPENDAGE EXCLUSION LEFT WITH TRANSESOPHAGEAL ECHOCARDIOGRAM N/A 2023    Procedure: Atrial Appendage Occlusion;  Surgeon: Anthony Mcdaniel MD;  Location:  [facility];  Service: Cardiovascular;  Laterality: N/A;    CARDIAC CATHETERIZATION Left 2022    Procedure: Left Heart Cath;  Surgeon: Titus Oliveros IV, MD;  Location:  [facility];  Service: Cardiovascular;  Laterality: Left;    CARDIOVERSION      CATARACT EXTRACTION Bilateral     COLONOSCOPY      CYSTOSCOPY      ENDOSCOPY      possible    KIDNEY STONE SURGERY      x1       FAM HX:  Family History   Problem Relation Age of Onset    Alzheimer's disease Mother     COPD Father     Lung cancer Father     Cancer Father     Kidney disease Father     No Known Problems Daughter     No Known Problems Daughter     No Known Problems Daughter        SOC HX:  Social History     Socioeconomic History    Marital status:    Tobacco Use    Smoking status: Former     Current packs/day: 0.00     Average packs/day: 1 pack/day for 15.0 years (15.0 ttl pk-yrs)     Types: Cigarettes     Start date: 1947     Quit date: 1960     Years since quittin.3     Passive exposure: Past    Smokeless tobacco: Former     Types: Chew     Quit date:     Tobacco comments:     started at 13 yo.   Vaping Use    Vaping status: Never Used    Passive vaping exposure: Yes   Substance and Sexual Activity    Alcohol use: No    Drug use: Never    Sexual activity: Not Currently     Partners: Female       PHYSICAL EXAM  BP (!) 131/104 (BP Location: Right arm, Patient Position: Sitting)   Pulse 103   Temp 97.3 °F (36.3 °C) (Temporal)   Resp 18   Ht 190.5 cm (75\")   Wt 103 kg (226 lb 6.6 oz)   SpO2 95%   BMI 28.30 kg/m²   Wt Readings from Last 3 Encounters:   24 103 kg (226 lb 6.6 oz)   24 108 kg (239 lb)   24 99.3 kg (219 lb)   ,body mass index is " 28.3 kg/m².  Physical Exam  Constitutional:       General: He is not in acute distress.     Appearance: He is ill-appearing.   Eyes:      General: Scleral icterus present.   Cardiovascular:      Rate and Rhythm: Tachycardia present. Rhythm irregular.   Pulmonary:      Effort: Pulmonary effort is normal. No respiratory distress.   Abdominal:      General: Bowel sounds are normal. There is no distension.      Palpations: Abdomen is soft.      Tenderness: There is no abdominal tenderness. There is no guarding.   Skin:     Coloration: Skin is jaundiced.   Neurological:      Mental Status: He is alert and oriented to person, place, and time.   Psychiatric:         Mood and Affect: Mood normal.         Behavior: Behavior normal.       Results Review:   I reviewed the patient's new clinical results.    Lab Results   Component Value Date    WBC 5.84 09/05/2024    HGB 13.3 09/05/2024    HGB 13.3 09/04/2024    HGB 10.4 (L) 06/03/2024    HCT 40.6 09/05/2024    MCV 90.2 09/05/2024     09/05/2024       Lab Results   Component Value Date    INR 1.38 (H) 09/05/2024    INR 1.34 (H) 09/04/2024    INR 1.56 (H) 01/03/2024     Lab Results   Component Value Date    GLUCOSE 82 09/05/2024    BUN 28 (H) 09/05/2024    CREATININE 0.84 09/05/2024    EGFRIFNONA 79 12/16/2021    BCR 33.3 (H) 09/05/2024     09/05/2024    K 3.2 (L) 09/05/2024    CO2 27.0 09/05/2024    CALCIUM 8.6 09/05/2024    PROTENTOTREF 6.2 05/13/2024    ALBUMIN 3.1 (L) 09/04/2024    ALKPHOS 423 (H) 09/04/2024    BILITOT 8.1 (H) 09/04/2024    ALT 65 (H) 09/04/2024    AST 74 (H) 09/04/2024     MRI abdomen without contrast, MRCP:  Findings:  Motion artifact degrades the image quality of this examination.   There are small bilateral pleural effusions with areas of basilar atelectasis better described and seen on CT scan. Within the limitations of motion artifact, the pancreas is normal. The liver parenchyma is unremarkable. There are cysts of the left kidney. The  right kidney is normal.   The gallbladder is mildly distended but there is no evidence of wall thickening or pericholecystic fluid. The common bile duct is nondilated. However, there are several small filling defects in the distal common bile duct consistent with nonobstructing choledocholithiasis. The largest stone is about 5 mm. The maximum common bile duct diameter is about 9 mm.   IMPRESSION:  Impression:  Choledocholithiasis with several small stones in the distal common bile duct. The largest stone is about 5 mm. The common bile duct is not dilated.    CT Abdomen/Pelvis with contrast and CTA chest :  Indication: soa.   Comparison: 4/15/2024   Technique: Axial CT images were obtained of the chest and CT of the abdomen and pelvis after the uneventful intravenous administration of 86 cc Isovue-370 IV contrast utilizing pulmonary embolism protocol.  Reconstructed coronal and sagittal images were also obtained. Automated exposure control and iterative construction methods were used.   Findings: The thyroid gland is normal. The subglottic airway is clear. No aortic dissection. 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 No pulmonary embolus. Small right and trace left pleural effusions. No pericardial effusion.   Liver: 0.8 cm right hepatic cyst.   Spleen:No masses. No perisplenic hematoma.   Pancreas:No pancreatic masses. No evidence of pancreatitis.   Gallbladder and common bile duct: Dilated gallbladder with extrahepatic biliary ductal dilatation. There appears to be material possibly noncalcified calculus within the still common bile duct resulting in partial obstruction.   Adrenal glands:No adrenal masses   Kidneys and ureters: Exophytic 2.3 cm left renal cyst. Exophytic 2 cm left renal cyst. Prominent left extrarenal pelvis and hydroureter on a chronic basis extending to the urinary bladder.    Urinary bladder:No urinary bladder wall thickening. No bladder masses.   Small bowel:Normal caliber small  bowel.   Large bowel:No diverticulosis or diverticulitis. No large bowel masses are appreciated   Appendix: Normal appendix.   GENITOURINARY: Normal prostate   Ascites or pneumoperitoneum:None.   Adenopathy:None present   Osseous structures: Degenerative changes of the hips. The proximal femurs are intact. The pubic bones are intact. The sacroiliac joints are normal. Multilevel degenerative changes of the lumbar spine.   Other findings: None   IMPRESSION:  1. There appears to be debris or noncalcified calculi within the distal common bile duct. No evidence of cholecystitis at this time.  2. No pulmonary embolus.  3. Small right and trace left pleural effusions.  4. 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 Cardiology consult recommended.    ASSESSMENTS/PLANS    Choledocholithiasis with biliary obstruction  Nausea and vomiting, secondary to above  Cholestatic hepatitis due to # 1   Atrial fibrillation with RVR, evaluated by cardiology.   S/p Watchmen device.       >> Recommend ERCP today.  Discussed risks of bleeding, perforation, infection, pancreatitis and failed cannulation.  Patient elects to proceed.     >> NPO    I discussed the patient's findings and my recommendations with patient    BRIGETTE Gonzales  09/05/24  10:51 EDT

## 2024-09-05 NOTE — CASE MANAGEMENT/SOCIAL WORK
Discharge Planning Assessment  [facility]     Patient Name: Darien Camarena  MRN: [id number]  Today's Date: 9/5/2024    Admit Date: 9/4/2024        Discharge Needs Assessment       Row Name 09/05/24 1231       Living Environment    People in Home alone    Current Living Arrangements home    Potentially Unsafe Housing Conditions unable to assess    In the past 12 months has the electric, gas, oil, or water company threatened to shut off services in your home? Yes    Primary Care Provided by self    Provides Primary Care For no one    Family Caregiver if Needed child(ann), adult    Family Caregiver Names Nikki Ware (daughter) [phone], Columba Hayden (daughter) [phone], Dolly Bray (daughter) [phone]    Quality of Family Relationships unable to assess    Able to Return to Prior Arrangements yes       Resource/Environmental Concerns    Resource/Environmental Concerns none    Transportation Concerns none       Transportation Needs    In the past 12 months, has lack of transportation kept you from medical appointments or from getting medications? no    In the past 12 months, has lack of transportation kept you from meetings, work, or from getting things needed for daily living? No       Food Insecurity    Within the past 12 months, you worried that your food would run out before you got the money to buy more. Never true    Within the past 12 months, the food you bought just didn't last and you didn't have money to get more. Never true       Transition Planning    Patient/Family Anticipates Transition to home    Patient/Family Anticipated Services at Transition none    Transportation Anticipated family or friend will provide       Discharge Needs Assessment    Readmission Within the Last 30 Days no previous admission in last 30 days    Equipment Currently Used at Home cpap;grab bar;cane, straight;rollator;bp cuff;pulse ox    Concerns to be Addressed discharge planning    Anticipated Changes Related to  Illness none    Equipment Needed After Discharge bp cuff;cpap;cane, straight;grab bar, tub/shower;rollator;pulse ox    Current Discharge Risk dependent with mobility/activities of daily living;lives alone    Discharge Coordination/Progress Due to Pt's confusion, I spoke with daughterNikki, via telephone. Pt is admitted with choledocholithiasis. He lives alone in [facility]. There are no steps in the home. He drives. He ambulates independently with a straight cane. His daughter prepares his medication; Otherwise, Pt is independent with ADLs at baseline. He has the following DME: CPAP, grab bars, straight cane, rollator, BP cuff, raised toilet seat, and pulse oximeter. He self caths at home. His PCP is Dr Pito Way. He has [employer] Medicare insurance which has Rx coverage. He uses [facility] on [address]. He has previously used HH but daughter cannot recall the name of the agency (not previously documented in medical record). Pt has not required home O2. The goal is for Pt to return home at discharge. Family will provide transportation. Daughter requested an order for a transport chair; A referral was called to Kushal with [employer]. Daughter reports Pt's insurance changed from [facility] Medicare to [employer] this year which will allow new mobility equipment (in regards to standard 5 yr limitation). CM will continue to follow hospital course.    *Addendum 9/5 @ 4959 - I received a call from Kushal with [employer]. When processing the claim for the transport chair, it appears Pt does have the same insurance he did when he received the rollator. [employer] can offer the equipment as a rent-to-own with a monthly cost of $50 x 6 months. Daughter, Nikki, informed. Daughter wants to discuss with her sisters and call me back with a decision.                    Discharge Plan    No documentation.                 Continued Care and Services - Admitted Since 9/4/2024       Durable Medical Equipment       Service Provider  Request Status Selected Services Address Phone Fax Patient Preferred    [facility] Pending - No Request Sent N/A [address] [place], [place] [id number] [phone] --                  Expected Discharge Date and Time       Expected Discharge Date Expected Discharge Time    Sep 7, 2024            Demographic Summary       Row Name 09/05/24 1230       General Information    Arrived From home    Reason for Consult discharge planning    Preferred Language English    General Information Comments PCP Dr Pito Way       Contact Information    Permission Granted to Share Info With     Contact Information Obtained for     Contact Information Comments Nikki Ware (daughter) [phone], Columba Hayden (daughter) [phone], Dolly Bray (daughter) [phone]                   Functional Status       Row Name 09/05/24 1231       Functional Status    Usual Activity Tolerance good    Current Activity Tolerance moderate       Functional Status, IADL    Medications assistive person    Meal Preparation independent    Housekeeping independent    Laundry independent    Shopping independent  granddaughter assists with deep cleaning                   Psychosocial    No documentation.                  Abuse/Neglect    No documentation.                  Legal    No documentation.                  Substance Abuse    No documentation.                  Patient Forms    No documentation.                     Pat Lake, RN

## 2024-09-06 PROBLEM — E43 SEVERE MALNUTRITION: Status: ACTIVE | Noted: 2024-09-06

## 2024-09-06 LAB
ALBUMIN SERPL-MCNC: 2.9 G/DL (ref 3.5–5.2)
ALBUMIN/GLOB SERPL: 0.8 G/DL
ALP SERPL-CCNC: 389 U/L (ref 39–117)
ALT SERPL W P-5'-P-CCNC: 42 U/L (ref 1–41)
ANION GAP SERPL CALCULATED.3IONS-SCNC: 14 MMOL/L (ref 5–15)
AST SERPL-CCNC: 29 U/L (ref 1–40)
BASOPHILS # BLD AUTO: 0.01 10*3/MM3 (ref 0–0.2)
BASOPHILS NFR BLD AUTO: 0.2 % (ref 0–1.5)
BILIRUB SERPL-MCNC: 3.7 MG/DL (ref 0–1.2)
BUN SERPL-MCNC: 23 MG/DL (ref 8–23)
BUN/CREAT SERPL: 33.8 (ref 7–25)
CALCIUM SPEC-SCNC: 8.9 MG/DL (ref 8.6–10.5)
CHLORIDE SERPL-SCNC: 100 MMOL/L (ref 98–107)
CO2 SERPL-SCNC: 24 MMOL/L (ref 22–29)
CREAT SERPL-MCNC: 0.68 MG/DL (ref 0.76–1.27)
DEPRECATED RDW RBC AUTO: 61.8 FL (ref 37–54)
EGFRCR SERPLBLD CKD-EPI 2021: 90 ML/MIN/1.73
EOSINOPHIL # BLD AUTO: 0 10*3/MM3 (ref 0–0.4)
EOSINOPHIL NFR BLD AUTO: 0 % (ref 0.3–6.2)
ERYTHROCYTE [DISTWIDTH] IN BLOOD BY AUTOMATED COUNT: 18.7 % (ref 12.3–15.4)
GLOBULIN UR ELPH-MCNC: 3.6 GM/DL
GLUCOSE BLDC GLUCOMTR-MCNC: 146 MG/DL (ref 70–130)
GLUCOSE BLDC GLUCOMTR-MCNC: 172 MG/DL (ref 70–130)
GLUCOSE BLDC GLUCOMTR-MCNC: 206 MG/DL (ref 70–130)
GLUCOSE BLDC GLUCOMTR-MCNC: 261 MG/DL (ref 70–130)
GLUCOSE SERPL-MCNC: 125 MG/DL (ref 65–99)
HCT VFR BLD AUTO: 38.8 % (ref 37.5–51)
HGB BLD-MCNC: 12.9 G/DL (ref 13–17.7)
IMM GRANULOCYTES # BLD AUTO: 0.01 10*3/MM3 (ref 0–0.05)
IMM GRANULOCYTES NFR BLD AUTO: 0.2 % (ref 0–0.5)
LYMPHOCYTES # BLD AUTO: 0.45 10*3/MM3 (ref 0.7–3.1)
LYMPHOCYTES NFR BLD AUTO: 9.3 % (ref 19.6–45.3)
MAGNESIUM SERPL-MCNC: 1.6 MG/DL (ref 1.6–2.4)
MCH RBC QN AUTO: 29.7 PG (ref 26.6–33)
MCHC RBC AUTO-ENTMCNC: 33.2 G/DL (ref 31.5–35.7)
MCV RBC AUTO: 89.2 FL (ref 79–97)
MONOCYTES # BLD AUTO: 0.15 10*3/MM3 (ref 0.1–0.9)
MONOCYTES NFR BLD AUTO: 3.1 % (ref 5–12)
NEUTROPHILS NFR BLD AUTO: 4.2 10*3/MM3 (ref 1.7–7)
NEUTROPHILS NFR BLD AUTO: 87.2 % (ref 42.7–76)
NRBC BLD AUTO-RTO: 0 /100 WBC (ref 0–0.2)
PHOSPHATE SERPL-MCNC: 3 MG/DL (ref 2.5–4.5)
PLATELET # BLD AUTO: 164 10*3/MM3 (ref 140–450)
PMV BLD AUTO: 10.6 FL (ref 6–12)
POTASSIUM SERPL-SCNC: 3.8 MMOL/L (ref 3.5–5.2)
PROT SERPL-MCNC: 6.5 G/DL (ref 6–8.5)
RBC # BLD AUTO: 4.35 10*6/MM3 (ref 4.14–5.8)
SODIUM SERPL-SCNC: 138 MMOL/L (ref 136–145)
WBC NRBC COR # BLD AUTO: 4.82 10*3/MM3 (ref 3.4–10.8)

## 2024-09-06 PROCEDURE — 99232 SBSQ HOSP IP/OBS MODERATE 35: CPT | Performed by: INTERNAL MEDICINE

## 2024-09-06 PROCEDURE — 99232 SBSQ HOSP IP/OBS MODERATE 35: CPT | Performed by: PHYSICIAN ASSISTANT

## 2024-09-06 PROCEDURE — 25010000002 METRONIDAZOLE 500 MG/100ML SOLUTION: Performed by: INTERNAL MEDICINE

## 2024-09-06 PROCEDURE — 94664 DEMO&/EVAL PT USE INHALER: CPT

## 2024-09-06 PROCEDURE — 85025 COMPLETE CBC W/AUTO DIFF WBC: CPT | Performed by: INTERNAL MEDICINE

## 2024-09-06 PROCEDURE — 82948 REAGENT STRIP/BLOOD GLUCOSE: CPT

## 2024-09-06 PROCEDURE — 94799 UNLISTED PULMONARY SVC/PX: CPT

## 2024-09-06 PROCEDURE — 63710000001 INSULIN REGULAR HUMAN PER 5 UNITS: Performed by: INTERNAL MEDICINE

## 2024-09-06 PROCEDURE — 80053 COMPREHEN METABOLIC PANEL: CPT | Performed by: INTERNAL MEDICINE

## 2024-09-06 PROCEDURE — 84100 ASSAY OF PHOSPHORUS: CPT | Performed by: INTERNAL MEDICINE

## 2024-09-06 PROCEDURE — 25010000002 CEFTRIAXONE PER 250 MG: Performed by: INTERNAL MEDICINE

## 2024-09-06 PROCEDURE — 83735 ASSAY OF MAGNESIUM: CPT | Performed by: INTERNAL MEDICINE

## 2024-09-06 RX ORDER — URSODIOL 300 MG/1
300 CAPSULE ORAL 2 TIMES DAILY
Status: DISCONTINUED | OUTPATIENT
Start: 2024-09-06 | End: 2024-09-08 | Stop reason: HOSPADM

## 2024-09-06 RX ORDER — METOPROLOL TARTRATE 50 MG
50 TABLET ORAL EVERY 12 HOURS SCHEDULED
Status: DISCONTINUED | OUTPATIENT
Start: 2024-09-06 | End: 2024-09-07

## 2024-09-06 RX ADMIN — METOPROLOL TARTRATE 50 MG: 50 TABLET, FILM COATED ORAL at 20:40

## 2024-09-06 RX ADMIN — INSULIN HUMAN 3 UNITS: 100 INJECTION, SOLUTION PARENTERAL at 00:12

## 2024-09-06 RX ADMIN — METRONIDAZOLE 500 MG: 5 INJECTION, SOLUTION INTRAVENOUS at 05:05

## 2024-09-06 RX ADMIN — URSODIOL 300 MG: 300 CAPSULE ORAL at 20:37

## 2024-09-06 RX ADMIN — SODIUM CHLORIDE 2000 MG: 900 INJECTION INTRAVENOUS at 20:37

## 2024-09-06 RX ADMIN — INSULIN HUMAN 2 UNITS: 100 INJECTION, SOLUTION PARENTERAL at 17:13

## 2024-09-06 RX ADMIN — Medication 10 MG: at 20:37

## 2024-09-06 RX ADMIN — ASPIRIN 81 MG 81 MG: 81 TABLET ORAL at 09:01

## 2024-09-06 RX ADMIN — IPRATROPIUM BROMIDE 0.5 MG: 0.5 SOLUTION RESPIRATORY (INHALATION) at 20:58

## 2024-09-06 RX ADMIN — Medication 10 ML: at 20:41

## 2024-09-06 RX ADMIN — Medication 10 ML: at 09:01

## 2024-09-06 RX ADMIN — INSULIN HUMAN 4 UNITS: 100 INJECTION, SOLUTION PARENTERAL at 12:29

## 2024-09-06 RX ADMIN — PANTOPRAZOLE SODIUM 40 MG: 40 TABLET, DELAYED RELEASE ORAL at 06:09

## 2024-09-06 RX ADMIN — METRONIDAZOLE 500 MG: 5 INJECTION, SOLUTION INTRAVENOUS at 20:37

## 2024-09-06 RX ADMIN — METOPROLOL TARTRATE 50 MG: 50 TABLET, FILM COATED ORAL at 11:08

## 2024-09-06 RX ADMIN — PANTOPRAZOLE SODIUM 40 MG: 40 TABLET, DELAYED RELEASE ORAL at 17:13

## 2024-09-06 RX ADMIN — IPRATROPIUM BROMIDE 0.5 MG: 0.5 SOLUTION RESPIRATORY (INHALATION) at 16:21

## 2024-09-06 RX ADMIN — ARFORMOTEROL TARTRATE 15 MCG: 15 SOLUTION RESPIRATORY (INHALATION) at 20:58

## 2024-09-06 RX ADMIN — Medication 10 MG/HR: at 00:04

## 2024-09-06 RX ADMIN — Medication 12.5 MG/HR: at 12:46

## 2024-09-06 RX ADMIN — METRONIDAZOLE 500 MG: 5 INJECTION, SOLUTION INTRAVENOUS at 12:28

## 2024-09-06 RX ADMIN — ARFORMOTEROL TARTRATE 15 MCG: 15 SOLUTION RESPIRATORY (INHALATION) at 08:09

## 2024-09-06 RX ADMIN — Medication 10 MG/HR: at 21:55

## 2024-09-06 RX ADMIN — IPRATROPIUM BROMIDE 0.5 MG: 0.5 SOLUTION RESPIRATORY (INHALATION) at 08:08

## 2024-09-06 NOTE — CASE MANAGEMENT/SOCIAL WORK
Continued Stay Note  [facility]     Patient Name: Darien Camarena  MRN: [id number]  Today's Date: 9/6/2024    Admit Date: 9/4/2024    Plan: Home   Discharge Plan       Row Name 09/06/24 1100       Plan    Plan Home    Plan Comments Pt has ERCP done with stone extraction and sphincterotomy yesterday. He is tolerating clear liquids. He is is AFib and on a Cardizem drip. He is on 2L NC (does not wear home O2). Plan is home at discharge. CM will continue to follow.    *Addendum: 9/6 @ 2935 - I met with Pt and daughter, Nikki, in room. They have decided to go forward with request for transport chair (rent-to-own). I informed Kushal with [employer]. Kushal stated transport chair can be delivered to Pt's room Monday. Pt and daughter informed.    Final Discharge Disposition Code 01 - home or self-care                   Discharge Codes    No documentation.                 Expected Discharge Date and Time       Expected Discharge Date Expected Discharge Time    Sep 9, 2024               Pat Lake RN

## 2024-09-06 NOTE — CONSULTS
Malnutrition Severity Assessment    Patient Name:  Darien Camarena  YOB: 1935  MRN: [id number]  Admit Date:  9/4/2024    Patient meets criteria for : Severe Malnutrition    Comments:  Pt meets criteria for severe malnutrition in the context of acute illness indicated by moderate muscle wasting and subcutaneous fat loss, po intake <75% EEN x >/=7 days. Suspect wasting multi-factorial w/age-related sarcopenia and inadequate po intake.     Malnutrition Severity Assessment  Malnutrition Type: Acute Disease or Injury - Related Malnutrition  Malnutrition Type (Last 8 Hours)       Malnutrition Severity Assessment       Row Name 09/06/24 1241       Malnutrition Severity Assessment    Malnutrition Type Acute Disease or Injury - Related Malnutrition      Row Name 09/06/24 1241       Insufficient Energy Intake     Insufficient Energy Intake Findings Moderate    Insufficient Energy Intake  <75% of est. energy requirement for >7d)      Row Name 09/06/24 1241       Unintentional Weight Loss     Unintentional Weight Loss Findings --  8lb/3.6% x 2 weeks, pt reporting 15-20lbs x 2-3 weeks but unable to verify wts within this time frame.      Row Name 09/06/24 1241       Muscle Loss    Loss of Muscle Mass Findings Moderate    Clavicle Bone Region Severe - protruding prominent bone    Dorsal Hand Region Moderate - slight depression    Patellar Region Moderate - patella more prominent, less muscle definition around patella    Anterior Thigh Region Moderate - mild depression on inner thigh    Posterior Calf Region Moderate - some roundness, slight firmness      Row Name 09/06/24 1241       Fat Loss    Subcutaneous Fat Loss Findings Moderate    Orbital Region  Moderate -  somewhat hollowness, slightly dark circles    Upper Arm Region Moderate - some fat tissue, not ample      Row Name 09/06/24 1241       Criteria Met (Must meet criteria for severity in at least 2 of these categories: M Wasting, Fat Loss, Fluid,  Secondary Signs, Wt. Status, Intake)    Patient meets criteria for  Severe Malnutrition                    Electronically signed by:  Mayela Olivares MS,RD,LD  09/06/24 12:42 EDT

## 2024-09-06 NOTE — PROGRESS NOTES
"General Surgery Daily Progress Note    Subjective:  Tolerating some p.o.  Passing flatus.  Denies abdominal pain.    Objective:  /89 (BP Location: Right arm, Patient Position: Lying)   Pulse 100   Temp 97.9 °F (36.6 °C) (Oral)   Resp 16   Ht 190.5 cm (75\")   Wt 105 kg (231 lb 0.7 oz)   SpO2 96%   BMI 28.88 kg/m²     General Appearance: Minimal  Eyes: Anicteric  Neck: Trachea midline   Cardiovascular:  RR, mild tachycardia, without murmur nor rub  Lungs:  Bilateral respirations unlabored   Abdomen:  Soft, nontender, no masses, no organomegaly   Extremities:  No cyanosis or edema   Skin:  No obvious rashes   Neurologic: awake and conversant     CBC:  Results from last 7 days   Lab Units 09/06/24  0910   WBC 10*3/mm3 4.82   HEMOGLOBIN g/dL 12.9*   HEMATOCRIT % 38.8   PLATELETS 10*3/mm3 164       CMP:  Results from last 7 days   Lab Units 09/06/24  0910   SODIUM mmol/L 138   POTASSIUM mmol/L 3.8   CHLORIDE mmol/L 100   CO2 mmol/L 24.0   BUN mg/dL 23   CREATININE mg/dL 0.68*   CALCIUM mg/dL 8.9   BILIRUBIN mg/dL 3.7*   ALK PHOS U/L 389*   ALT (SGPT) U/L 42*   AST (SGOT) U/L 29   GLUCOSE mg/dL 125*     Assessment:  Choledocholithiasis status post ERCP with stone extraction    Plan:   He seems to be tolerating a regular diet.  His total bilirubin is decreasing.  Regarding his gallbladder I have no plans for acute surgical intervention.  On imaging studies he has no retained stones in his gallbladder: MR or CT imaging.  Added ursodiol for now, would take for 1 month.  On his fluoroscopic ERCP images the cystic duct is patent.  May follow-up as needed.  Will sign off, call with questions.    Rancho Fink MD - 9/6/2024, 16:48 EDT         "

## 2024-09-06 NOTE — PROGRESS NOTES
[facility] Medicine Services  PROGRESS NOTE    Patient Name: Darien Camarena  : 1935  MRN: [id number]    Date of Admission: 2024  Primary Care Physician: Pito Way MD    Subjective   Subjective     CC:  Abd pain    HPI:  Feeling much better, tolerating clears      Objective   Objective     Vital Signs:   Temp:  [96.3 °F (35.7 °C)-98.1 °F (36.7 °C)] 97.7 °F (36.5 °C)  Heart Rate:  [] 126  Resp:  [16-18] 16  BP: ()/() 118/96  Flow (L/min):  [2-4] 2     Physical Exam:  Constitutional: No acute distress, awake, alert  HENT: NCAT, mucous membranes moist  Respiratory: Clear to auscultation bilaterally, respiratory effort normal   Cardiovascular: tachy but regular  Gastrointestinal: abd soft, not tender, not distended  Musculoskeletal: venous stasis changes, erythema  Psychiatric: Appropriate affect, cooperative  Neurologic: Oriented x 3, speech clear  Skin: No rashes      Results Reviewed:  LAB RESULTS:      Lab 24  0910 24  0724  0724  1821   WBC 4.82 5.84  --  7.14   HEMOGLOBIN 12.9* 13.3  --  13.3   HEMATOCRIT 38.8 40.6  --  41.6   PLATELETS 164 146  --  182   NEUTROS ABS 4.20 4.31  --  5.81   IMMATURE GRANS (ABS) 0.01 0.03  --  0.01   LYMPHS ABS 0.45* 0.77  --  0.73   MONOS ABS 0.15 0.44  --  0.51   EOS ABS 0.00 0.27  --  0.07   MCV 89.2 90.2  --  89.5   PROTIME  --   --  17.1* 17.2*   APTT  --   --   --  34.0*         Lab 24  0713 24  1821   SODIUM 139 137   POTASSIUM 3.2* 3.6   CHLORIDE 101 100   CO2 27.0 27.7   ANION GAP 11.0 9.3   BUN 28* 32*   CREATININE 0.84 1.09   EGFR 84.4 65.7   GLUCOSE 82 112*   CALCIUM 8.6 9.0   MAGNESIUM  --  1.6         Lab 24  1821   TOTAL PROTEIN 6.4   ALBUMIN 3.1*   GLOBULIN 3.3   ALT (SGPT) 65*   AST (SGOT) 74*   BILIRUBIN 8.1*   ALK PHOS 423*   LIPASE 18         Lab 24  0712 24  1821   HSTROP T  --  27* 30*   PROTIME 17.1*  --  17.2*   INR  1.38*  --  1.34*                 Brief Urine Lab Results  (Last result in the past 365 days)        Color   Clarity   Blood   Leuk Est   Nitrite   Protein   CREAT   Urine HCG        09/04/24 2148 Yellow   Clear   Negative   Small (1+)   Negative   Trace                   Microbiology Results Abnormal       None            FL ERCP pancreatic and biliary ducts    Result Date: 9/5/2024  FL ERCP PANCREATIC AND BILIARY DUCTS Date of Exam: 9/5/2024 2:17 PM EDT Indication: ENDOSCOPIC RETROGRADE CHOLANGIOPANCREATOGRAPHY.   Comparison: None available. Technique:  A series of radiographic digital spot films were obtained in conjunction with an endoscopic catheterization of the biliary and pancreatic ductal system, performed by the gastroenterologist. Fluoroscopic Time: 2 minutes 48 seconds Number of Images: 11 Findings: Dictation is to record 2 minutes and 48 seconds of fluoroscopy time. A total of 11 images obtained show endoscope with side cannula placement, contrast injection of the common duct, apparent balloon sweep, and subsequent plastic stent placement. Please see the procedure report for full details.     Impression: Impression: Fluoroscopy provided during ERCP and stent placement. Electronically Signed: Oliverio Younger MD  9/5/2024 4:25 PM EDT  Workstation ID: [id number]    MRI abdomen wo contrast mrcp    Result Date: 9/5/2024  MRI ABDOMEN WO CONTRAST MRCP Date of Exam: 9/5/2024 4:42 AM EDT Indication: elevated LFTS, calculi within the distal common bile duct.  Comparison: CT abdomen pelvis dated September 4, 2024 Technique:  Routine multiplanar/multisequence images of the abdomen were obtained with MRCP sequences without contrast administration. Findings: Motion artifact degrades the image quality of this examination. There are small bilateral pleural effusions with areas of basilar atelectasis better described and seen on CT scan. Within the limitations of motion artifact, the pancreas is normal. The liver parenchyma is  unremarkable. There are cysts of the left kidney. The right kidney is normal. The gallbladder is mildly distended but there is no evidence of wall thickening or pericholecystic fluid. The common bile duct is nondilated. However, there are several small filling defects in the distal common bile duct consistent with nonobstructing choledocholithiasis. The largest stone is about 5 mm. The maximum common bile duct diameter is about 9 mm.     Impression: Impression: Choledocholithiasis with several small stones in the distal common bile duct. The largest stone is about 5 mm. The common bile duct is not dilated. Electronically Signed: Timothy Willoughby MD  9/5/2024 5:19 AM EDT  Workstation ID: [id number]    CT Abdomen Pelvis With Contrast    Result Date: 9/4/2024  CT ANGIOGRAM CHEST PULMONARY EMBOLISM, CT ABDOMEN PELVIS W CONTRAST Date of Exam: 9/4/2024 7:42 PM EDT Indication: soa. Comparison: 4/15/2024 Technique: Axial CT images were obtained of the chest and CT of the abdomen and pelvis after the uneventful intravenous administration of 86 cc Isovue-370 IV contrast utilizing pulmonary embolism protocol.  Reconstructed coronal and sagittal images were also obtained. Automated exposure control and iterative construction methods were used. Findings: The thyroid gland is normal. The subglottic airway is clear. No aortic dissection. 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 No pulmonary embolus. Small right and trace left pleural effusions. No pericardial effusion. Liver: 0.8 cm right hepatic cyst. Spleen:No masses. No perisplenic hematoma. Pancreas:No pancreatic masses. No evidence of pancreatitis. Gallbladder and common bile duct: Dilated gallbladder with extrahepatic biliary ductal dilatation. There appears to be material possibly noncalcified calculus within the still common bile duct resulting in partial obstruction. Adrenal glands:No adrenal masses Kidneys and ureters: Exophytic 2.3 cm left renal cyst. Exophytic 2  cm left renal cyst. Prominent left extrarenal pelvis and hydroureter on a chronic basis extending to the urinary bladder. Urinary bladder:No urinary bladder wall thickening. No bladder masses. Small bowel:Normal caliber small bowel. Large bowel:No diverticulosis or diverticulitis. No large bowel masses are appreciated Appendix: Normal appendix. GENITOURINARY: Normal prostate Ascites or pneumoperitoneum:None. Adenopathy:None present Osseous structures: Degenerative changes of the hips. The proximal femurs are intact. The pubic bones are intact. The sacroiliac joints are normal. Multilevel degenerative changes of the lumbar spine. Other findings: None     Impression: 1. There appears to be debris or noncalcified calculi within the distal common bile duct. No evidence of cholecystitis at this time. 2. No pulmonary embolus. 3. Small right and trace left pleural effusions. 4. 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 Cardiology consult recommended. Electronically Signed: Yoni Rios MD  9/4/2024 8:06 PM EDT  Workstation ID: [id number]    CT Angiogram Chest Pulmonary Embolism    Result Date: 9/4/2024  CT ANGIOGRAM CHEST PULMONARY EMBOLISM, CT ABDOMEN PELVIS W CONTRAST Date of Exam: 9/4/2024 7:42 PM EDT Indication: soa. Comparison: 4/15/2024 Technique: Axial CT images were obtained of the chest and CT of the abdomen and pelvis after the uneventful intravenous administration of 86 cc Isovue-370 IV contrast utilizing pulmonary embolism protocol.  Reconstructed coronal and sagittal images were also obtained. Automated exposure control and iterative construction methods were used. Findings: The thyroid gland is normal. The subglottic airway is clear. No aortic dissection. 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 No pulmonary embolus. Small right and trace left pleural effusions. No pericardial effusion. Liver: 0.8 cm right hepatic cyst. Spleen:No masses. No perisplenic hematoma. Pancreas:No pancreatic  masses. No evidence of pancreatitis. Gallbladder and common bile duct: Dilated gallbladder with extrahepatic biliary ductal dilatation. There appears to be material possibly noncalcified calculus within the still common bile duct resulting in partial obstruction. Adrenal glands:No adrenal masses Kidneys and ureters: Exophytic 2.3 cm left renal cyst. Exophytic 2 cm left renal cyst. Prominent left extrarenal pelvis and hydroureter on a chronic basis extending to the urinary bladder. Urinary bladder:No urinary bladder wall thickening. No bladder masses. Small bowel:Normal caliber small bowel. Large bowel:No diverticulosis or diverticulitis. No large bowel masses are appreciated Appendix: Normal appendix. GENITOURINARY: Normal prostate Ascites or pneumoperitoneum:None. Adenopathy:None present Osseous structures: Degenerative changes of the hips. The proximal femurs are intact. The pubic bones are intact. The sacroiliac joints are normal. Multilevel degenerative changes of the lumbar spine. Other findings: None     Impression: 1. There appears to be debris or noncalcified calculi within the distal common bile duct. No evidence of cholecystitis at this time. 2. No pulmonary embolus. 3. Small right and trace left pleural effusions. 4. 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 Cardiology consult recommended. Electronically Signed: Yoni Rios MD  9/4/2024 8:06 PM EDT  Workstation ID: [id number]    CT Head Without Contrast    Result Date: 9/4/2024  CT HEAD WO CONTRAST Date of Exam: 9/4/2024 7:36 PM EDT Indication: confusion. Comparison: MRI brain 11/13/2023 Technique: Axial CT images were obtained of the head without contrast administration.  Automated exposure control and iterative construction methods were used. Findings: Negative for large territory loss of gray-white differentiation, acute intracranial hemorrhage, large parenchymal mass, midline shift or hydrocephalus. Stable incidental benign left temporal  arachnoid cyst. Mild global parenchymal volume loss stable from  prior. Mild periventricular and subcortical hypodensity suggesting chronic microvascular ischemic change stable from prior. No large extra-axial fluid collection. Symmetric appearing globes. Distal carotid and vertebral artery atherosclerotic plaque. Layering fluid in the right maxillary sinus with asymmetric mucoperiosteal thickening. This could reflect acute on chronic sinusitis in the right clinical setting. No large mastoid effusion. Negative for depressed skull fracture. Degenerative changes at the dens.     Impression: Impression: 1. No acute intracranial findings by CT. Chronic findings detailed above similar to previous MRI brain comparison. 2. Possible acute on chronic sinusitis in the right clinical setting, with layering fluid noted in the right maxillary sinus. Electronically Signed: Compa Dominguez MD  9/4/2024 7:56 PM EDT  Workstation ID: [id number]    XR Chest 1 View    Result Date: 9/4/2024  XR CHEST 1 VW Date of Exam: 9/4/2024 5:55 PM EDT Indication: Weak/Dizzy/AMS triage protocol Comparison: Chest radiograph 6/3/2024 Findings: Cardiomediastinal silhouette unchanged from prior. Platelike region of atelectasis versus scar left lower lobe similar to prior. Slight elevated left hemidiaphragm similar to prior. No new consolidation, edema, effusion or pneumothorax. Degenerative related osseous changes. Stable radiographic appearance of the chest since 6/3/2024.     Impression: Impression: No acute radiographic findings. Stable appearance of the chest since 6/3/2024 comparison. Electronically Signed: Compa Dominguez MD  9/4/2024 6:13 PM EDT  Workstation ID: [id number]     Results for orders placed during the hospital encounter of 01/25/24    Adult Transthoracic Echo Limited W/ Cont if Necessary Per Protocol    Interpretation Summary    Left ventricular systolic function is low normal. Calculated left ventricular EF = 47.8% Left ventricular  ejection fraction appears to be 46 - 50%.      Current medications:  Scheduled Meds:ipratropium, 0.5 mg, Nebulization, 4x Daily - RT   And  arformoterol, 15 mcg, Nebulization, BID - RT  aspirin, 81 mg, Oral, Daily  cefTRIAXone, 2,000 mg, Intravenous, Q24H  insulin regular, 2-7 Units, Subcutaneous, Q6H  metoprolol tartrate, 50 mg, Oral, Q12H  metroNIDAZOLE, 500 mg, Intravenous, Q8H  pantoprazole, 40 mg, Oral, BID AC  sodium chloride, 10 mL, Intravenous, Q12H      Continuous Infusions:dilTIAZem, 5-15 mg/hr, Last Rate: 12.5 mg/hr (09/06/24 0902)      PRN Meds:.  acetaminophen **OR** acetaminophen **OR** acetaminophen    senna-docusate sodium **AND** polyethylene glycol **AND** bisacodyl **AND** bisacodyl    Calcium Replacement - Follow Nurse / BPA Driven Protocol    dextrose    dextrose    glucagon (human recombinant)    ipratropium-albuterol    Magnesium Standard Dose Replacement - Follow Nurse / BPA Driven Protocol    nitroglycerin    Phosphorus Replacement - Follow Nurse / BPA Driven Protocol    Potassium Replacement - Follow Nurse / BPA Driven Protocol    sodium chloride    sodium chloride    sodium chloride    Assessment & Plan   Assessment & Plan     Active Hospital Problems    Diagnosis  POA    **Choledocholithiasis [K80.50]  Yes    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]  Yes    Presence of Watchman left atrial appendage closure device [Z95.818]  Yes    Coronary artery disease involving native coronary artery of native heart without angina pectoris [I25.10]  Yes    Stage 3 chronic kidney disease [N18.30]  Yes    Permanent atrial fibrillation [I48.21]  Yes    Type 2 diabetes mellitus with stage 3 chronic kidney disease, without long-term current use of insulin [E11.22, N18.30]  Yes    Hyperlipidemia LDL goal <100 [E78.5]  Yes    Essential hypertension [I10]  Yes      Resolved Hospital Problems   No resolved problems to display.        Brief Hospital Course to date:  Darien Felix  Migue is a 88 y.o. male with a history of afib, CKD, CAD, CHF, COPD, GERD, DM, STEVEN on CPAP, lung cancer, HTN, HLD, was presented to the hospital with jaundice.  CT abdomen with distal common bile duct noncalcified calculi    This patient's problems and plans were partially entered by my partner and updated as appropriate by me 09/06/24.       Obstructive jaundice  Choledocholithiasis   Elevated LFT's  Alkaline phos 423, ast 74, alt 65, bili 8.1  CT abdomen pelvis shows debris or noncalcified calculi within the distal common bile duct.  Underwent ERCP with multiple stone extraction, stent placement.  Pus noted. Continue Rocephin and Flagyl  No blood cultures drawn on admission  General Surgery consulted for lap dg but currently no plans for surgical intervention.        CAD  A-fib with RVR status post Watchman device  Continue Cardizem drip for rate control  Transition to p.o. metoprolol from home meds when able to take oral  Status post Watchman device.  Not on anticoagulation  Cardiology following and recommended to start aspirin 81 mg daily     Type 2 diabetes   A1c 5.9%  SSI     Chronic urinary retention  Self caths 4 times daily     COPD  As needed DuoNebs     CKD stage III  Creatinine stable around baseline of 1.1     Dementia  Depression  CT head shows no acute intracranial findings   Family stated that patient is at baseline       Expected Discharge Location and Transportation: needs PT eval  Expected Discharge   Expected Discharge Date: 9/9/2024; Expected Discharge Time:      VTE Prophylaxis:  Mechanical VTE prophylaxis orders are present.         AM-PAC 6 Clicks Score (PT): 19 (09/05/24 1094)    CODE STATUS:   Code Status and Medical Interventions: CPR (Attempt to Resuscitate); Full Support   Ordered at: 09/05/24 7190     Level Of Support Discussed With:    Patient     Code Status (Patient has no pulse and is not breathing):    CPR (Attempt to Resuscitate)     Medical Interventions (Patient has pulse or is  breathing):    Full Support       Meena Gill, DO  09/06/24

## 2024-09-06 NOTE — PROGRESS NOTES
"  [facility]  PROGRESS NOTE    Date of Admission: 9/4/2024  Date of Service: 09/06/24    Primary Care Physician: Pito Way MD    Chief Complaint: f/u Afib with RVR  Problem List:   Choledocholithiasis    Type 2 diabetes mellitus with stage 3 chronic kidney disease, without long-term current use of insulin    Hyperlipidemia LDL goal <100    Essential hypertension    Permanent atrial fibrillation    Stage 3 chronic kidney disease    Coronary artery disease involving native coronary artery of native heart without angina pectoris    Presence of Watchman left atrial appendage closure device    Heart failure with mildly reduced ejection fraction (HFmrEF)      Subjective      Comfortable in bed, denies chest pain or abdominal pain. Ate a clear liquid breakfast. Remains in Afib, rates 120s.       Objective   Vitals: /100   Pulse 113   Temp 97.7 °F (36.5 °C) (Oral)   Resp 16   Ht 190.5 cm (75\")   Wt 105 kg (231 lb 0.7 oz)   SpO2 94%   BMI 28.88 kg/m²     Physical Exam:  GENERAL: Alert, cooperative, in no acute distress.   HEENT: Normocephalic, no jugular venous distention  HEART: Irregular rhythm, normal rate, and no murmurs, gallops, or rubs.   LUNGS:  No wheezing, rales or rhonchi.  NEUROLOGIC: No focal abnormalities involving strength or sensation are noted.   EXTREMITIES: No clubbing, cyanosis, or edema noted.     Results:  Results from last 7 days   Lab Units 09/05/24  0713 09/04/24  1821   WBC 10*3/mm3 5.84 7.14   HEMOGLOBIN g/dL 13.3 13.3   HEMATOCRIT % 40.6 41.6   PLATELETS 10*3/mm3 146 182     Results from last 7 days   Lab Units 09/05/24  0713 09/04/24  1821   SODIUM mmol/L 139 137   POTASSIUM mmol/L 3.2* 3.6   CHLORIDE mmol/L 101 100   CO2 mmol/L 27.0 27.7   BUN mg/dL 28* 32*   CREATININE mg/dL 0.84 1.09   GLUCOSE mg/dL 82 112*      Lab Results   Component Value Date    CHOL 75 05/07/2024    TRIG 83 05/07/2024    HDL 26 (L) 05/07/2024    LDL 32 " 05/07/2024    AST 74 (H) 09/04/2024    ALT 65 (H) 09/04/2024           Results from last 7 days   Lab Units 09/05/24  0712 09/04/24  1821   PROTIME Seconds 17.1* 17.2*   INR  1.38* 1.34*   APTT seconds  --  34.0*     Results from last 7 days   Lab Units 09/04/24 2026 09/04/24  1821   HSTROP T ng/L 27* 30*         Intake/Output Summary (Last 24 hours) at 9/6/2024 0845  Last data filed at 9/6/2024 0040  Gross per 24 hour   Intake 400 ml   Output 1200 ml   Net -800 ml     I personally reviewed the patient's EKG/Telemetry data    Radiology Data:   FL ERCP pancreatic and biliary ducts    Result Date: 9/5/2024  Impression: Fluoroscopy provided during ERCP and stent placement. Electronically Signed: Oliverio Younger MD  9/5/2024 4:25 PM EDT  Workstation ID: [id number]    MRI abdomen wo contrast mrcp    Result Date: 9/5/2024  Impression: Choledocholithiasis with several small stones in the distal common bile duct. The largest stone is about 5 mm. The common bile duct is not dilated. Electronically Signed: Timothy Willoughby MD  9/5/2024 5:19 AM EDT  Workstation ID: [id number]    CT Abdomen Pelvis With Contrast    Result Date: 9/4/2024  1. There appears to be debris or noncalcified calculi within the distal common bile duct. No evidence of cholecystitis at this time. 2. No pulmonary embolus. 3. Small right and trace left pleural effusions. 4. 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 Cardiology consult recommended. Electronically Signed: Yoni Rios MD  9/4/2024 8:06 PM EDT  Workstation ID: [id number]    CT Angiogram Chest Pulmonary Embolism    Result Date: 9/4/2024  1. There appears to be debris or noncalcified calculi within the distal common bile duct. No evidence of cholecystitis at this time. 2. No pulmonary embolus. 3. Small right and trace left pleural effusions. 4. 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 Cardiology consult recommended. Electronically Signed: Yoni Rios MD  9/4/2024 8:06 PM EDT  Workstation  ID: [id number]    CT Head Without Contrast    Result Date: 9/4/2024  Impression: 1. No acute intracranial findings by CT. Chronic findings detailed above similar to previous MRI brain comparison. 2. Possible acute on chronic sinusitis in the right clinical setting, with layering fluid noted in the right maxillary sinus. Electronically Signed: Compa Dominguez MD  9/4/2024 7:56 PM EDT  Workstation ID: [id number]    XR Chest 1 View    Result Date: 9/4/2024  Impression: No acute radiographic findings. Stable appearance of the chest since 6/3/2024 comparison. Electronically Signed: Compa Dominguez MD  9/4/2024 6:13 PM EDT  Workstation ID: [id number]         Current Medications:  ipratropium, 0.5 mg, Nebulization, 4x Daily - RT   And  arformoterol, 15 mcg, Nebulization, BID - RT  aspirin, 81 mg, Oral, Daily  cefTRIAXone, 2,000 mg, Intravenous, Q24H  insulin regular, 2-7 Units, Subcutaneous, Q6H  metroNIDAZOLE, 500 mg, Intravenous, Q8H  pantoprazole, 40 mg, Oral, BID AC  sodium chloride, 10 mL, Intravenous, Q12H      dilTIAZem, 5-15 mg/hr, Last Rate: 12.5 mg/hr (09/06/24 0611)        Assessment and Plan:     Active Hospital Problems    Diagnosis     **Choledocholithiasis     Heart failure with mildly reduced ejection fraction (HFmrEF)      Cardiac catheterization (2022): Mild CAD. Normal LV filling pressure  Echo (8/8/2022): EF 50%.  Anatomically and functionally normal valves  FARHAD (1/12/2024): LVEF 40%.  27 mm Watchman device well-seated.  No thrombus or periprosthetic flow.  Mild MR but no significant valvular abnormality  Echo (1/27/2024): LVEF 48%      Presence of Watchman left atrial appendage closure device      Plavix discontinued 5/2024.      Coronary artery disease involving native coronary artery of native heart without angina pectoris      Cardiac catheterization (9/29/2022): Mild CAD.  Normal LV filling pressure      Stage 3 chronic kidney disease     Permanent atrial fibrillation      Diagnosed 2012.   FARHAD-guided  ECV, 8/17/2012  CHADS-VASc 5 (age > 75, CAD, HTN, DM)  Multiple cardioversions, 2018, 2019, 2020, 2021  Unsuccessful cardioversion with conversion to rate control strategy, 2023  Echo (8/8/2022): EF 50%, anatomically and functionally normal valves  Watchman implant by Anthony Mcdaniel, 11/28/2023  FARHAD (1/12/2024): LVEF 40%.  27 mm Watchman device well-seated.  No thrombus or periprosthetic flow.   Limited TTE (1/27/2024): LVEF 46 to 50%        Type 2 diabetes mellitus with stage 3 chronic kidney disease, without long-term current use of insulin     Hyperlipidemia LDL goal <100      Moderate intensity statin therapy reasonable due to diabetic status      Essential hypertension      Target blood pressure <130/80 mmHg         Plan:  Continue IV Cardizem drip for rate control of atrial fibrillation. Will add back home Metoprolol at half dose of 50mg BID and wean off Cardizem. Titrate BB as able   Patient currently not on full chronic anticoagulation due to Watchman device  Other intercurrent issues per primary service.        Electronically signed by Frannie Jacobs PA-C, 09/06/24, 10:40 AM EDT.

## 2024-09-06 NOTE — PAYOR COMM NOTE
"Darien Camarena Isidro \"Isidro\" (88 y.o. Male)       Date of Birth   1935    Social Security Number       [address] [facility] [address]    Home Phone   [phone]    MRN   [id number]       Mandaen   Presybeterian    Marital Status                               Admission Date   9/4/24    Admission Type   Emergency    Admitting Provider   Meena Gill DO    Attending Provider   Meena Gill DO    Department, Room/Bed   [facility] 6A, N621/1       Discharge Date       Discharge Disposition       Discharge Destination                                 Attending Provider: Meena Gill DO    Allergies: Xarelto [Rivaroxaban], Sglt2 Inhibitors, Penicillins    Isolation: Contact   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), 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), ESBL Klebsiella (06/09/23)   Code Status: CPR    Ht: 190.5 cm (75\")   Wt: 105 kg (231 lb 0.7 oz)    Admission Cmt: None   Principal Problem: Choledocholithiasis [K80.50]                   Active Insurance as of 9/4/2024       Primary Coverage       Payor Plan Insurance Group Employer/Plan Group    ANTHEM MEDICARE REPLACEMENT [email or web address] MEDICARE ADVANTAGE [id number]       Payor Plan Address Payor Plan Phone Number Payor Plan Fax Number Effective Dates    [address] [phone]  1/1/2024 - None Entered    [facility] [address]         Subscriber Name Subscriber Birth Date Member ID       DARIEN CAMARENA 1935 [id number]                     Emergency Contacts        (Rel.) Home Phone Work Phone Mobile Phone    SHAWN KWON (Daughter) [phone] -- [phone]    LEONNEIL (Daughter) [phone] -- [phone]    Migue KatarinaDolly (Daughter) -- -- [phone]              [place]: NPI [id number] Tax ID [id number]  Insurance Information                  ANTHEM MEDICARE REPLACEMENT/ANTHEM MEDICARE ADVANTAGE Phone: [phone]    Subscriber: Darien Camarena Isidro Subscriber#: [id number]    Group#: " [id number] Precert#: --             History & Physical        Topher Walls III, DO at [phone]              [facility] Medicine Services  HISTORY AND PHYSICAL    Patient Name: Darien Camarena  : 1935  MRN: [id number]  Primary Care Physician: Pito Way MD  Date of admission: 2024    Subjective  Subjective     Chief Complaint:  Jaundice     HPI:  Darien Camarena is a 88 y.o. male with a history of afib, CKD, CAD, CHF, COPD, GERD, DM, STEVEN on CPAP, lung cancer, HTN, HLD, was transferred from [facility] with jaundice.  Patient reports that he was experiencing nausea and vomiting on .  He also had diarrhea and noticed some blood in his stool.  He endorses abdominal pain, fatigue, cough, and generalized weakness.  Yesterday he noticed a yellow discoloration to his skin.  No shortness of air, chest pain, fevers, chills, dysuria, or any other complaints at this time.  Labs and imaging concerning for choledocholithiasis.  Patient was found to be in afib with RVR at OSH and started on a diltiazem drip.  Patient is being admitted to the Hospitalist for further evaluation and management.    Review of Systems   Constitutional:  Positive for appetite change and fatigue. Negative for chills and fever.   HENT: Negative.     Eyes: Negative.    Respiratory:  Positive for cough. Negative for shortness of breath.    Cardiovascular: Negative.    Gastrointestinal:  Positive for abdominal pain, diarrhea, nausea and vomiting.   Endocrine: Negative.    Genitourinary: Negative.    Musculoskeletal: Negative.    Skin:  Positive for color change.   Allergic/Immunologic: Negative.    Neurological:  Positive for weakness. Negative for dizziness, light-headedness and headaches.   Hematological: Negative.    Psychiatric/Behavioral:  Positive for confusion.                 Personal History     Past Medical History:   Diagnosis Date    Arrhythmia     Atrial fibrillation     Bilateral  leg cramps     possible new medication related side effects    Chronic kidney disease     Clotting disorder     pt doesnt know about this    COPD (chronic obstructive pulmonary disease)     Coronary artery disease     Diabetes mellitus     doesnt check sugar    E. coli sepsis 06/23/2022    Enlarged prostate without lower urinary tract symptoms (luts) 06/20/2016    Full dentures     GERD (gastroesophageal reflux disease)     Gout     Hearing aid worn     bilat prn    History of colonoscopy 09/12/2012    History of radiation therapy 02/24/2023    SBRT LLL lung    [place] (hard of hearing)     hearing aids prn    Hyperlipidemia     Hypertension     Kidney stone     surgery x1    Lung cancer     STEVEN on CPAP     compliant with machine    PAF (paroxysmal atrial fibrillation)     Prostatism     Sleep apnea     CPAP HS    Urinary incontinence     Urinary tract infection     Wears glasses     readers         Oncology Problem List:  Malignant neoplasm of lower lobe of left lung (01/30/2023; Status:   Active)    Oncology/Hematology History   Malignant neoplasm of lower lobe of left lung   1/30/2023 Initial Diagnosis    Malignant neoplasm of lower lobe of left lung (HCC)     2/14/2023 - 2/24/2023 Radiation    Radiation OncologyTreatment Course:  Darien Camarena received 5000 cGy in 5 fractions to left lung via External Beam Radiation - EBRT.         Past Surgical History:   Procedure Laterality Date    ATRIAL APPENDAGE EXCLUSION LEFT WITH TRANSESOPHAGEAL ECHOCARDIOGRAM N/A 11/28/2023    Procedure: Atrial Appendage Occlusion;  Surgeon: Anthony Mcdaniel MD;  Location:  [facility];  Service: Cardiovascular;  Laterality: N/A;    CARDIAC CATHETERIZATION Left 09/29/2022    Procedure: Left Heart Cath;  Surgeon: Titus Oliveros IV, MD;  Location:  [facility];  Service: Cardiovascular;  Laterality: Left;    CARDIOVERSION      CATARACT EXTRACTION Bilateral     COLONOSCOPY      CYSTOSCOPY       ENDOSCOPY      possible    KIDNEY STONE SURGERY      x1       Family History:  family history includes Alzheimer's disease in his mother; COPD in his father; Cancer in his father; Kidney disease in his father; Lung cancer in his father; No Known Problems in his daughter, daughter, and daughter.     Social History:  reports that he quit smoking about 64 years ago. His smoking use included cigarettes. He started smoking about 77 years ago. He has a 15 pack-year smoking history. He has been exposed to tobacco smoke. He quit smokeless tobacco use about 13 years ago.  His smokeless tobacco use included chew. He reports that he does not drink alcohol and does not use drugs.  Social History     Social History Narrative    Caffeine: 1 cup of decaff coffee daily        Medications:  Coenzyme Q10, Iron, Probiotic Product, albuterol sulfate HFA, allopurinol, ascorbic acid, aspirin, docusate sodium, donepezil, finasteride, furosemide, memantine, metFORMIN, metOLazone, methenamine, metoprolol tartrate, multivitamin with minerals, omeprazole, pravastatin, sertraline, tamsulosin, and tiotropium bromide-olodaterol    Allergies   Allergen Reactions    Xarelto [Rivaroxaban] GI Bleeding     GI bleed    Sglt2 Inhibitors Other (See Comments)     Recurrent UTI    Penicillins Hives     Has tolerated cefepime, ceftriaxone, cefazolin, cefdinir, cefuroxime, cephalexin       Objective  Objective     Vital Signs:   Temp:  [97.8 °F (36.6 °C)-98.1 °F (36.7 °C)] 98.1 °F (36.7 °C)  Heart Rate:  [101-126] 101  Resp:  [16] 16  BP: (111-143)/() 134/104    Physical Exam   Constitutional: Awake, alert, resting in bed, family at bedside  Eyes: PERRLA, sclerae anicteric, no conjunctival injection  HENT: NCAT, mucous membranes moist  Neck: Supple, no thyromegaly, no lymphadenopathy, trachea midline  Respiratory: Clear to auscultation bilaterally, nonlabored respirations   Cardiovascular: irregularly irregular, tachycardia, no murmurs, rubs, or  gallops, palpable pedal pulses bilaterally  Gastrointestinal: Positive bowel sounds, soft, nontender, nondistended  Musculoskeletal: 1+ BLE edema, no clubbing or cyanosis to extremities  Psychiatric: Appropriate affect, cooperative  Neurologic: Oriented x 3, strength symmetric in all extremities, Cranial Nerves grossly intact to confrontation, speech clear  Skin: jaundice       Result Review:  I have personally reviewed the results from the time of this admission to 9/5/2024 03:30 EDT and agree with these findings:  [x]  Laboratory list / accordion  []  Microbiology  [x]  Radiology  []  EKG/Telemetry   []  Cardiology/Vascular   []  Pathology  [x]  Old records  []  Other:  Most notable findings include:     LAB RESULTS:      Lab 09/04/24  1821   WBC 7.14   HEMOGLOBIN 13.3   HEMATOCRIT 41.6   PLATELETS 182   NEUTROS ABS 5.81   IMMATURE GRANS (ABS) 0.01   LYMPHS ABS 0.73   MONOS ABS 0.51   EOS ABS 0.07   MCV 89.5   PROTIME 17.2*   APTT 34.0*         Lab 09/04/24  1821   SODIUM 137   POTASSIUM 3.6   CHLORIDE 100   CO2 27.7   ANION GAP 9.3   BUN 32*   CREATININE 1.09   EGFR 65.7   GLUCOSE 112*   CALCIUM 9.0   MAGNESIUM 1.6         Lab 09/04/24  1821   TOTAL PROTEIN 6.4   ALBUMIN 3.1*   GLOBULIN 3.3   ALT (SGPT) 65*   AST (SGOT) 74*   BILIRUBIN 8.1*   ALK PHOS 423*   LIPASE 18         Lab 09/04/24 2026 09/04/24  1821   HSTROP T 27* 30*   PROTIME  --  17.2*   INR  --  1.34*                 Brief Urine Lab Results  (Last result in the past 365 days)        Color   Clarity   Blood   Leuk Est   Nitrite   Protein   CREAT   Urine HCG        09/04/24 2148 Yellow   Clear   Negative   Small (1+)   Negative   Trace                 Microbiology Results (last 10 days)       ** No results found for the last 240 hours. **            CT Abdomen Pelvis With Contrast    Result Date: 9/4/2024  CT ANGIOGRAM CHEST PULMONARY EMBOLISM, CT ABDOMEN PELVIS W CONTRAST Date of Exam: 9/4/2024 7:42 PM EDT Indication: soa. Comparison: 4/15/2024  Technique: Axial CT images were obtained of the chest and CT of the abdomen and pelvis after the uneventful intravenous administration of 86 cc Isovue-370 IV contrast utilizing pulmonary embolism protocol.  Reconstructed coronal and sagittal images were also obtained. Automated exposure control and iterative construction methods were used. Findings: The thyroid gland is normal. The subglottic airway is clear. No aortic dissection. 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 No pulmonary embolus. Small right and trace left pleural effusions. No pericardial effusion. Liver: 0.8 cm right hepatic cyst. Spleen:No masses. No perisplenic hematoma. Pancreas:No pancreatic masses. No evidence of pancreatitis. Gallbladder and common bile duct: Dilated gallbladder with extrahepatic biliary ductal dilatation. There appears to be material possibly noncalcified calculus within the still common bile duct resulting in partial obstruction. Adrenal glands:No adrenal masses Kidneys and ureters: Exophytic 2.3 cm left renal cyst. Exophytic 2 cm left renal cyst. Prominent left extrarenal pelvis and hydroureter on a chronic basis extending to the urinary bladder. Urinary bladder:No urinary bladder wall thickening. No bladder masses. Small bowel:Normal caliber small bowel. Large bowel:No diverticulosis or diverticulitis. No large bowel masses are appreciated Appendix: Normal appendix. GENITOURINARY: Normal prostate Ascites or pneumoperitoneum:None. Adenopathy:None present Osseous structures: Degenerative changes of the hips. The proximal femurs are intact. The pubic bones are intact. The sacroiliac joints are normal. Multilevel degenerative changes of the lumbar spine. Other findings: None     Impression: 1. There appears to be debris or noncalcified calculi within the distal common bile duct. No evidence of cholecystitis at this time. 2. No pulmonary embolus. 3. Small right and trace left pleural effusions. 4. Severe atheromatous  disease of the coronary vessels. Cardiology consult recommended. Electronically Signed: Yoni Rios MD  9/4/2024 8:06 PM EDT  Workstation ID: [id number]    CT Angiogram Chest Pulmonary Embolism    Result Date: 9/4/2024  CT ANGIOGRAM CHEST PULMONARY EMBOLISM, CT ABDOMEN PELVIS W CONTRAST Date of Exam: 9/4/2024 7:42 PM EDT Indication: soa. Comparison: 4/15/2024 Technique: Axial CT images were obtained of the chest and CT of the abdomen and pelvis after the uneventful intravenous administration of 86 cc Isovue-370 IV contrast utilizing pulmonary embolism protocol.  Reconstructed coronal and sagittal images were also obtained. Automated exposure control and iterative construction methods were used. Findings: The thyroid gland is normal. The subglottic airway is clear. No aortic dissection. 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 No pulmonary embolus. Small right and trace left pleural effusions. No pericardial effusion. Liver: 0.8 cm right hepatic cyst. Spleen:No masses. No perisplenic hematoma. Pancreas:No pancreatic masses. No evidence of pancreatitis. Gallbladder and common bile duct: Dilated gallbladder with extrahepatic biliary ductal dilatation. There appears to be material possibly noncalcified calculus within the still common bile duct resulting in partial obstruction. Adrenal glands:No adrenal masses Kidneys and ureters: Exophytic 2.3 cm left renal cyst. Exophytic 2 cm left renal cyst. Prominent left extrarenal pelvis and hydroureter on a chronic basis extending to the urinary bladder. Urinary bladder:No urinary bladder wall thickening. No bladder masses. Small bowel:Normal caliber small bowel. Large bowel:No diverticulosis or diverticulitis. No large bowel masses are appreciated Appendix: Normal appendix. GENITOURINARY: Normal prostate Ascites or pneumoperitoneum:None. Adenopathy:None present Osseous structures: Degenerative changes of the hips. The proximal femurs are intact. The pubic bones are  intact. The sacroiliac joints are normal. Multilevel degenerative changes of the lumbar spine. Other findings: None     Impression: 1. There appears to be debris or noncalcified calculi within the distal common bile duct. No evidence of cholecystitis at this time. 2. No pulmonary embolus. 3. Small right and trace left pleural effusions. 4. 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 Cardiology consult recommended. Electronically Signed: Yoni Rios MD  9/4/2024 8:06 PM EDT  Workstation ID: [id number]    CT Head Without Contrast    Result Date: 9/4/2024  CT HEAD WO CONTRAST Date of Exam: 9/4/2024 7:36 PM EDT Indication: confusion. Comparison: MRI brain 11/13/2023 Technique: Axial CT images were obtained of the head without contrast administration.  Automated exposure control and iterative construction methods were used. Findings: Negative for large territory loss of gray-white differentiation, acute intracranial hemorrhage, large parenchymal mass, midline shift or hydrocephalus. Stable incidental benign left temporal arachnoid cyst. Mild global parenchymal volume loss stable from  prior. Mild periventricular and subcortical hypodensity suggesting chronic microvascular ischemic change stable from prior. No large extra-axial fluid collection. Symmetric appearing globes. Distal carotid and vertebral artery atherosclerotic plaque. Layering fluid in the right maxillary sinus with asymmetric mucoperiosteal thickening. This could reflect acute on chronic sinusitis in the right clinical setting. No large mastoid effusion. Negative for depressed skull fracture. Degenerative changes at the dens.     Impression: Impression: 1. No acute intracranial findings by CT. Chronic findings detailed above similar to previous MRI brain comparison. 2. Possible acute on chronic sinusitis in the right clinical setting, with layering fluid noted in the right maxillary sinus. Electronically Signed: Compa Dominguez MD  9/4/2024 7:56 PM  EDT  Workstation ID: [id number]    XR Chest 1 View    Result Date: 9/4/2024  XR CHEST 1 VW Date of Exam: 9/4/2024 5:55 PM EDT Indication: Weak/Dizzy/AMS triage protocol Comparison: Chest radiograph 6/3/2024 Findings: Cardiomediastinal silhouette unchanged from prior. Platelike region of atelectasis versus scar left lower lobe similar to prior. Slight elevated left hemidiaphragm similar to prior. No new consolidation, edema, effusion or pneumothorax. Degenerative related osseous changes. Stable radiographic appearance of the chest since 6/3/2024.     Impression: Impression: No acute radiographic findings. Stable appearance of the chest since 6/3/2024 comparison. Electronically Signed: Compa Dominguez MD  9/4/2024 6:13 PM EDT  Workstation ID: [id number]     Results for orders placed during the hospital encounter of 01/25/24    Adult Transthoracic Echo Limited W/ Cont if Necessary Per Protocol    Interpretation Summary    Left ventricular systolic function is low normal. Calculated left ventricular EF = 47.8% Left ventricular ejection fraction appears to be 46 - 50%.      Assessment & Plan  Assessment & Plan       Type 2 diabetes mellitus with stage 3 chronic kidney disease, without long-term current use of insulin    Hyperlipidemia LDL goal <100    Essential hypertension    Permanent atrial fibrillation    Stage 3 chronic kidney disease    Coronary artery disease involving native coronary artery of native heart without angina pectoris    Presence of Watchman left atrial appendage closure device    Heart failure with mildly reduced ejection fraction (HFmrEF)    Darien Camarena is a 88 y.o. male with a history of afib, CKD, CAD, CHF, COPD, GERD, DM, STEVEN on CPAP, lung cancer, HTN, HLD, was transferred from [facility] with jaundice.      Choledocholithiasis   Elevated LFT's  -- alk phos 423, ast 74, alt 65, bili 8.1  -- CT abdomen pelvis there is debris or noncalcified calculi within the distal common bile duct.  No evidence of  cholecystitis at this time.  -- MRCP if positive will consult GI  -- consult general surgery  -- npo  -- IV fluids  -- pain control  -- am labs    CAD  -- CT abdomen pelvis shows severe atheromatous disease.  -- cardiology consult recommended on imaging--will consult in the am  -- consult Dr. Oliveros for surgical/procedure clearance    T2DM  -- A1c in the am  -- fsbg with ssi    Afib with RVR s/p watchman device  -- continue diltiazem drip    HFmrEF  -- strict I&O's  -- daily weights    Pyuria  -- UA:  leukocytes small, protein trace, bili moderate, wbc 11-20, bacteria 3+, squamous 3-6  -- was give a dose of Rocephin at OSH  -- urine culture pending    Urinary retention  -- urinary retention protocol  -- self caths 4 times daily    COPD  -- duonebs    CKD  -- creatinine 1.09  -- stable    Dementia  Depression  -- CT head shows no acute intracranial findings   -- family at bedside states that patient is at his baseline    Pleural effusion  -- CT shows small right and trace left pleural effusions  -- monitor        DVT prophylaxis:  Mechanical    CODE STATUS:    Level Of Support Discussed With: Patient  Code Status (Patient has no pulse and is not breathing): CPR (Attempt to Resuscitate)  Medical Interventions (Patient has pulse or is breathing): Full Support      Expected Discharge  TBD  Expected discharge date/ time has not been documented.        Signature: Electronically signed by BERYL Cosme, 09/05/24, 3:30 AM EDT.     ----------------------    The patient was seen independently by the APC.  I was available for any questions or concerns.     Electronically signed by Topher Walls III, DO, 09/05/24, 5:20 AM EDT.                  Electronically signed by Topher Walls III, DO at 09/05/24 0536       Current Facility-Administered Medications   Medication Dose Route Frequency Provider Last Rate Last Admin    acetaminophen (TYLENOL) tablet 650 mg  650 mg Oral Q4H PRN Anthony Arambula MD         Or    acetaminophen (TYLENOL) 160 MG/5ML oral solution 650 mg  650 mg Oral Q4H PRN Anthony Arambula MD        Or    acetaminophen (TYLENOL) suppository 650 mg  650 mg Rectal Q4H PRN Anthony Arambula MD        ipratropium (ATROVENT) nebulizer solution 0.5 mg  0.5 mg Nebulization 4x Daily - RT Anthony Arambula MD   0.5 mg at 09/06/24 0808    And    arformoterol (BROVANA) nebulizer solution 15 mcg  15 mcg Nebulization BID - RT Anthony Arambula MD   15 mcg at 09/06/24 0809    aspirin chewable tablet 81 mg  81 mg Oral Daily Anthony Arambula MD   81 mg at 09/06/24 0901    sennosides-docusate (PERICOLACE) 8.6-50 MG per tablet 2 tablet  2 tablet Oral BID PRN Anthony Arambula MD        And    polyethylene glycol (MIRALAX) packet 17 g  17 g Oral Daily PRN Anthony Arambula MD        And    bisacodyl (DULCOLAX) EC tablet 5 mg  5 mg Oral Daily PRN Anthony Arambula MD        And    bisacodyl (DULCOLAX) suppository 10 mg  10 mg Rectal Daily PRN Anthony Arambula MD        Calcium Replacement - Follow Nurse / BPA Driven Protocol   Does not apply PRN Anthony Arambula MD        cefTRIAXone (ROCEPHIN) 2,000 mg in sodium chloride 0.9 % 100 mL MBP  2,000 mg Intravenous Q24H Antohny Arambula MD   Stopped at 09/05/24 2100    dextrose (D50W) (25 g/50 mL) IV injection 25 g  25 g Intravenous Q15 Min PRN Anthony Arambula MD        dextrose (GLUTOSE) oral gel 15 g  15 g Oral Q15 Min PRN Anthony Arambula MD        dilTIAZem (CARDIZEM) 125 mg in 125 mL D5W infusion  5-15 mg/hr Intravenous Titrated Anthony Arambula MD 12.5 mL/hr at 09/06/24 0902 12.5 mg/hr at 09/06/24 0902    glucagon (GLUCAGEN) injection 1 mg  1 mg Intramuscular Q15 Min PRN Anthony Arambula MD        insulin regular (humuLIN R,novoLIN R) injection 2-7 Units  2-7 Units Subcutaneous Q6H Anthony Arambula MD   3 Units at 09/06/24 0012    ipratropium-albuterol (DUO-NEB) nebulizer solution 3 mL  3 mL Nebulization Q4H PRN Anthony Arambula MD        Magnesium Standard Dose Replacement - Follow Nurse / BPA Driven  Protocol   Does not apply Anthony Obando MD        metroNIDAZOLE (FLAGYL) IVPB 500 mg  500 mg Intravenous Q8H Clarence Samaniego MD   Stopped at 09/06/24 0903    nitroglycerin (NITROSTAT) SL tablet 0.4 mg  0.4 mg Sublingual Q5 Min PRAnthony Guerrier MD        pantoprazole (PROTONIX) EC tablet 40 mg  40 mg Oral BID AC Anthony Arambula MD   40 mg at 09/06/24 0609    Phosphorus Replacement - Follow Nurse / BPA Driven Protocol   Does not apply PRnAthony Guerrier MD        Potassium Replacement - Follow Nurse / BPA Driven Protocol   Does not apply Anthony Obando MD        sodium chloride 0.9 % flush 10 mL  10 mL Intravenous PRN Anthony Arambula MD        sodium chloride 0.9 % flush 10 mL  10 mL Intravenous Q12H Anthony Arambula MD   10 mL at 09/06/24 0901    sodium chloride 0.9 % flush 10 mL  10 mL Intravenous PRAnthony Guerrier MD        sodium chloride 0.9 % infusion 40 mL  40 mL Intravenous PRN Anthony Arambula MD         Lab Results (last 24 hours)       Procedure Component Value Units Date/Time    CBC & Differential [[id number]]  (Abnormal) Collected: 09/06/24 0910    Specimen: Blood Updated: 09/06/24 1014    Narrative:      The following orders were created for panel order CBC & Differential.  Procedure                               Abnormality         Status                     ---------                               -----------         ------                     CBC Auto Differential[[id number]]        Abnormal            Final result                 Please view results for these tests on the individual orders.    CBC Auto Differential [[id number]]  (Abnormal) Collected: 09/06/24 0910    Specimen: Blood Updated: 09/06/24 1014     WBC 4.82 10*3/mm3      RBC 4.35 10*6/mm3      Hemoglobin 12.9 g/dL      Hematocrit 38.8 %      MCV 89.2 fL      MCH 29.7 pg      MCHC 33.2 g/dL      RDW 18.7 %      RDW-SD 61.8 fl      MPV 10.6 fL      Platelets 164 10*3/mm3      Neutrophil % 87.2 %      Lymphocyte % 9.3 %      Monocyte  % 3.1 %      Eosinophil % 0.0 %      Basophil % 0.2 %      Immature Grans % 0.2 %      Neutrophils, Absolute 4.20 10*3/mm3      Lymphocytes, Absolute 0.45 10*3/mm3      Monocytes, Absolute 0.15 10*3/mm3      Eosinophils, Absolute 0.00 10*3/mm3      Basophils, Absolute 0.01 10*3/mm3      Immature Grans, Absolute 0.01 10*3/mm3      nRBC 0.0 /100 WBC     Phosphorus [[id number]] Collected: 09/06/24 0910    Specimen: Blood Updated: 09/06/24 0953    Comprehensive Metabolic Panel [[id number]] Collected: 09/06/24 0910    Specimen: Blood Updated: 09/06/24 0953    Magnesium [[id number]] Collected: 09/06/24 0910    Specimen: Blood Updated: 09/06/24 0953    Tissue Pathology Exam [[id number]] Collected: 09/05/24 1447    Specimen: Tissue from Esophagus Updated: 09/06/24 0647    POC Glucose Once [[id number]]  (Abnormal) Collected: 09/06/24 0557    Specimen: Blood Updated: 09/06/24 0558     Glucose 146 mg/dL     POC Glucose Once [[id number]]  (Abnormal) Collected: 09/06/24 0003    Specimen: Blood Updated: 09/06/24 0004     Glucose 206 mg/dL     Hepatitis Panel, Acute [[id number]]  (Normal) Collected: 09/05/24 1756    Specimen: Blood Updated: 09/05/24 1836     Hepatitis B Surface Ag Non-Reactive     Hep A IgM Non-Reactive     Hep B C IgM Non-Reactive     Hepatitis C Ab Non-Reactive    Narrative:      Results may be falsely decreased if patient taking Biotin.     POC Glucose Once [[id number]]  (Normal) Collected: 09/05/24 1612    Specimen: Blood Updated: 09/05/24 1614     Glucose 112 mg/dL     POC Glucose Once [[id number]]  (Normal) Collected: 09/05/24 1408    Specimen: Blood Updated: 09/05/24 1409     Glucose 81 mg/dL     POC Glucose Once [[id number]]  (Normal) Collected: 09/05/24 1113    Specimen: Blood Updated: 09/05/24 1115     Glucose 80 mg/dL           Imaging Results (Last 24 Hours)       Procedure Component Value Units Date/Time    FL ERCP pancreatic and biliary ducts [[id number]] Collected: 09/05/24 1551     Updated: 09/05/24 1629     Narrative:      FL ERCP PANCREATIC AND BILIARY DUCTS    Date of Exam: 9/5/2024 2:17 PM EDT    Indication: ENDOSCOPIC RETROGRADE CHOLANGIOPANCREATOGRAPHY.       Comparison: None available.    Technique:  A series of radiographic digital spot films were obtained in conjunction with an endoscopic catheterization of the biliary and pancreatic ductal system, performed by the gastroenterologist.    Fluoroscopic Time: 2 minutes 48 seconds    Number of Images: 11    Findings:  Dictation is to record 2 minutes and 48 seconds of fluoroscopy time. A total of 11 images obtained show endoscope with side cannula placement, contrast injection of the common duct, apparent balloon sweep, and subsequent plastic stent placement. Please   see the procedure report for full details.      Impression:      Impression:  Fluoroscopy provided during ERCP and stent placement.      Electronically Signed: Oliverio Younger MD    9/5/2024 4:25 PM EDT    Workstation ID: [id number]          Orders (last 24 hrs)        Start     Ordered    09/07/24 0600  Comprehensive Metabolic Panel  Morning Draw         09/06/24 0729    09/07/24 0600  CBC & Differential  Morning Draw         09/06/24 0729    09/06/24 2200  cefTRIAXone (ROCEPHIN) 2,000 mg in sodium chloride 0.9 % 100 mL MBP  Every 24 Hours,   Status:  Discontinued         09/05/24 1643    09/06/24 1856  Auto Discontinue GI Panel in 48 Hours if not Collected  ONCE GI PANEL         09/04/24 1856    09/06/24 1008  Inpatient Admission  Once         09/06/24 1008    09/06/24 0600  CBC & Differential  Morning Draw         09/05/24 1644    09/06/24 0600  Comprehensive Metabolic Panel  Morning Draw         09/05/24 1644    09/06/24 0600  Magnesium  Morning Draw         09/05/24 1644    09/06/24 0600  Phosphorus  Morning Draw         09/05/24 1644    09/06/24 0600  CBC Auto Differential  PROCEDURE ONCE         09/05/24 2202    09/06/24 0559  POC Glucose Once  PROCEDURE ONCE        Comments: Complete no more than 45  minutes prior to patient eating      09/06/24 0557    09/06/24 0005  POC Glucose Once  PROCEDURE ONCE        Comments: Complete no more than 45 minutes prior to patient eating      09/06/24 0003    09/05/24 2000  metroNIDAZOLE (FLAGYL) IVPB 500 mg  Every 8 Hours         09/05/24 1643    09/05/24 2000  cefTRIAXone (ROCEPHIN) 2,000 mg in sodium chloride 0.9 % 100 mL MBP  Every 24 Hours         09/05/24 1825    09/05/24 1733  Vital Signs  Per Hospital Policy         09/05/24 1732    09/05/24 1733  Continuous Pulse Oximetry  Continuous         09/05/24 1732    09/05/24 1733  Diet: Liquid; Clear Liquid; Fluid Consistency: Thin (IDDSI 0)  Diet Effective Now         09/05/24 1732    09/05/24 1730  pantoprazole (PROTONIX) EC tablet 40 mg  2 Times Daily Before Meals         09/05/24 1526    09/05/24 1615  POC Glucose Once  PROCEDURE ONCE        Comments: Complete no more than 45 minutes prior to patient eating      09/05/24 1612    09/05/24 1528  metroNIDAZOLE (FLAGYL) IVPB 500 mg  Every 8 Hours,   Status:  Discontinued         09/05/24 1526    09/05/24 1528  cefTRIAXone (ROCEPHIN) 2,000 mg in sodium chloride 0.9 % 100 mL MBP  Every 24 Hours,   Status:  Discontinued         09/05/24 1526    09/05/24 1524  Diet: Liquid; Clear Liquid; Fluid Consistency: Thin (IDDSI 0)  Diet Effective Now,   Status:  Canceled         09/05/24 1526    09/05/24 1457  Indomethacin 100 MG  suppository suppository  As Needed,   Status:  Discontinued         09/05/24 1457    09/05/24 1447  Tissue Pathology Exam  RELEASE UPON ORDERING         09/05/24 1447    09/05/24 1420  levoFLOXacin (LEVAQUIN) 500 mg/100 mL D5W (premix) (LEVAQUIN) 500 mg  Every 24 Hours         09/05/24 1419    09/05/24 1410  POC Glucose Once  PROCEDURE ONCE        Comments: Complete no more than 45 minutes prior to patient eating      09/05/24 1408    09/05/24 1354  ERCP  Once         09/05/24 1354    09/05/24 1353  Upper GI Endoscopy  Once         09/05/24 1353    09/05/24 7247   "Hepatitis Panel, Acute  Once         09/04/24 1858    09/05/24 1115  POC Glucose Once  PROCEDURE ONCE        Comments: Complete no more than 45 minutes prior to patient eating      09/05/24 1113    09/05/24 1037  Oxygen Therapy- Nasal Cannula; Titrate 1-6 LPM Per SpO2; 90 - 95%  Continuous         09/05/24 1037    09/05/24 1037  Continuous Pulse Oximetry  Continuous,   Status:  Canceled         09/05/24 1037    09/05/24 1037  Vital Signs Every 5 Minutes for 15 Minutes, Every 15 Minutes Thereafter.  Continuous,   Status:  Canceled         09/05/24 1037    09/05/24 1037  Notify Anesthesia of Any Acute Changes in Patient Condition  Until Discontinued,   Status:  Canceled         09/05/24 1037    09/05/24 1037  Notify Anesthesia for Unrelieved Pain  Until Discontinued,   Status:  Canceled         09/05/24 1037    09/05/24 1037  Once Discharge Criteria to Floor Met, Follow Surgeon Orders  Continuous,   Status:  Canceled         09/05/24 1037    09/05/24 1037  Discharge Patient From PACU When Discharge Criteria Met  Continuous,   Status:  Canceled         09/05/24 1037    09/05/24 1036  ondansetron (ZOFRAN) injection 4 mg  Once As Needed,   Status:  Discontinued         09/05/24 1037    09/05/24 1036  ipratropium-albuterol (DUO-NEB) nebulizer solution 3 mL  Once As Needed,   Status:  Discontinued         09/05/24 1037    09/05/24 1025  FL ERCP pancreatic and biliary ducts  1 Time Imaging         09/05/24 1024    09/05/24 1000  aspirin chewable tablet 81 mg  Daily         09/05/24 0900    09/05/24 0900  sodium chloride 0.9 % flush 10 mL  Every 12 Hours Scheduled         09/05/24 0330    09/05/24 0830  ipratropium (ATROVENT) nebulizer solution 0.5 mg  4 Times Daily - RT        Placed in \"And\" Linked Group    09/05/24 0336    09/05/24 0830  arformoterol (BROVANA) nebulizer solution 15 mcg  2 Times Daily - RT        Placed in \"And\" Linked Group    09/05/24 0336    09/05/24 0800  Oral Care  2 Times Daily       09/05/24 0330    " "09/05/24 0600  POC Glucose Q6H  Every 6 Hours      Comments: Complete no more than 45 minutes prior to patient eating      09/05/24 0330 09/05/24 0600  insulin regular (humuLIN R,novoLIN R) injection 2-7 Units  Every 6 Hours Scheduled         09/05/24 0330    09/05/24 0600  Incentive Spirometry  Every 4 Hours While Awake       09/05/24 0330    09/05/24 0430  sodium chloride 0.9 % infusion  Continuous         09/05/24 0330 09/05/24 0400  Vital Signs  Every 4 Hours       09/05/24 0330 09/05/24 0331  Daily Weights  Daily       09/05/24 0330 09/05/24 0329  ipratropium-albuterol (DUO-NEB) nebulizer solution 3 mL  Every 4 Hours PRN         09/05/24 0330 09/05/24 0329  sennosides-docusate (PERICOLACE) 8.6-50 MG per tablet 2 tablet  2 Times Daily PRN        Placed in \"And\" Linked Group    09/05/24 0330 09/05/24 0329  polyethylene glycol (MIRALAX) packet 17 g  Daily PRN        Placed in \"And\" Linked Group    09/05/24 0330    09/05/24 0329  bisacodyl (DULCOLAX) EC tablet 5 mg  Daily PRN        Placed in \"And\" Linked Group    09/05/24 0330    09/05/24 0329  bisacodyl (DULCOLAX) suppository 10 mg  Daily PRN        Placed in \"And\" Linked Group    09/05/24 0330    09/05/24 0329  acetaminophen (TYLENOL) tablet 650 mg  Every 4 Hours PRN        Placed in \"Or\" Linked Group    09/05/24 0330    09/05/24 0329  acetaminophen (TYLENOL) 160 MG/5ML oral solution 650 mg  Every 4 Hours PRN        Placed in \"Or\" Linked Group    09/05/24 0330    09/05/24 0329  acetaminophen (TYLENOL) suppository 650 mg  Every 4 Hours PRN        Placed in \"Or\" Linked Group    09/05/24 0330    09/05/24 0329  Potassium Replacement - Follow Nurse / BPA Driven Protocol  As Needed         09/05/24 0330 09/05/24 0329  Magnesium Standard Dose Replacement - Follow Nurse / BPA Driven Protocol  As Needed         09/05/24 0330    09/05/24 0329  Phosphorus Replacement - Follow Nurse / BPA Driven Protocol  As Needed         09/05/24 0330    09/05/24 0329 "  Calcium Replacement - Follow Nurse / BPA Driven Protocol  As Needed         09/05/24 0330    09/05/24 0326  Strict Intake & Output  Every Shift       09/05/24 0330    09/05/24 0325  Oxygen Therapy- Nasal Cannula; Titrate 1-6 LPM Per SpO2; 90 - 95%  Continuous PRN,   Status:  Canceled       09/05/24 0330    09/05/24 0325  nitroglycerin (NITROSTAT) SL tablet 0.4 mg  Every 5 Minutes PRN         09/05/24 0330    09/05/24 0324  dextrose (D50W) (25 g/50 mL) IV injection 25 g  Every 15 Minutes PRN         09/05/24 0330    09/05/24 0324  glucagon (GLUCAGEN) injection 1 mg  Every 15 Minutes PRN         09/05/24 0330    09/05/24 0324  dextrose (GLUTOSE) oral gel 15 g  Every 15 Minutes PRN         09/05/24 0330    09/05/24 0324  sodium chloride 0.9 % flush 10 mL  As Needed         09/05/24 0330    09/05/24 0324  sodium chloride 0.9 % infusion 40 mL  As Needed         09/05/24 0330    09/05/24 0000  Miscellaneous DME         09/05/24 1305    09/04/24 2230  dilTIAZem (CARDIZEM) 125 mg in 125 mL D5W infusion  Titrated         09/04/24 2213    09/04/24 1750  Oxygen Therapy- Nasal Cannula; Titrate 1-6 LPM Per SpO2; 90 - 95%  Continuous PRN,   Status:  Canceled       09/04/24 1750 09/04/24 1750  sodium chloride 0.9 % flush 10 mL  As Needed         09/04/24 1750    Unscheduled  Assess Patient For Urinary Retention  As Needed      Comments: Signs / Symptoms Urinary Retention:  - Bladder Palpable  - Suprapubic / Bladder Discomfort or Pain  - Urge to Void, But Unable  - Reports Unable to Void After 8 Hours    09/05/24 0307    Unscheduled  Utilize Voiding Measures if Retention is Suspected  As Needed      Comments: Voiding Measures:  - Privacy  - Relaxed Environment  - Suprapubic Massage  - Suprapubic Warm Compress  - Trigger Techniques  - Stand to Void   - Bedside Commode    09/05/24 0307    Unscheduled  Bladder Scan for Suspected Urinary Retention  As Needed       09/05/24 0307    Unscheduled  Monitor Every 1-2 Hours for  Spontaneous Void if Bladder Scan Volume is Less Than 500mL & Patient is Without Symptoms of Bladder Discomfort / Distention  As Needed       09/05/24 0307    Unscheduled  Straight Cath For Acute Retention if Bladder Scan Volume is Greater Than 500mL or Patient Has Symptoms of Bladder Discomfort / Distention (Unless Contraindicated)  As Needed       09/05/24 0307    Unscheduled  Follow Hypoglycemia Standing Orders For Blood Glucose <70 & Notify Provider of Treatment  As Needed      Comments: Follow Hypoglycemia Orders As Outlined in Process Instructions (Open Order Report to View Full Instructions)  Notify Provider Any Time Hypoglycemia Treatment is Administered    09/05/24 0330    Unscheduled  Up With Assistance  As Needed       09/05/24 0330    --  tamsulosin (FLOMAX) 0.4 MG capsule 24 hr capsule  Daily         09/05/24 0234    Pending  Ambulatory referral for Screening EGD        Comments: Schedule repeat EGD and ERCP in 8 weeks to document healing of esophagitis and to remove biliary stent and perform balloon sweep of bile duct    Pending                     Operative/Procedure Notes (all)        Procedures signed by Anthony Arambula MD at 09/05/24 1536   Version 1 of 1       Procedure Orders    1. Upper GI Endoscopy [[id number]] ordered by Anthony Arambula MD at 09/05/24 1353               [Media Unavailable] Scan on 9/5/2024 1535 by Anthony Arambula MD: EGD          Electronically signed by Anthony Arambula MD at 09/05/24 1536       Procedures signed by Anthony Arambula MD at 09/05/24 1553   Version 1 of 1       Procedure Orders    1. ERCP [[id number]] ordered by Anthony Arambula MD at 09/05/24 1354               [Media Unavailable] Scan on 9/5/2024 1552 by Anthony Arambula MD: ERCP          Electronically signed by Anthony Arambula MD at 09/05/24 1553       Physician Progress Notes (last 24 hours)  Notes from 09/05/24 1016 through 09/06/24 1016   No notes of this type exist for this encounter.          Consult Notes (last 24  hours)        Rancho Fink MD at 09/05/24 1754        Consult Orders    1. Inpatient General Surgery Consult [[id number]] ordered by Anthony Arambula MD at 09/05/24 0330                 General Surgery Consultation Note    Date of Service: 9/5/2024  Darien Camarena  [id number]  1935      Referring Provider: Clarence Samaniego MD    Location of Consult: Inpatient     Reason for Consultation: Cholelithiasis       History of Present Illness:  I am seeing, Darien Isidro Camarena, in consultation for Clarence Samaniego MD regarding cholelithiasis.  88-year-old gentleman presented to the emergency room with fatigue, cough, generalized weakness, intermittent nausea and vomiting, diarrhea, and blood in his stool.  He was noted to be yellowish to his family and was subsequently brought to the emergency room.  He was noted to be in atrial fibrillation with rapid ventricular response and has been on medical therapy for this.  He was noted to have choledocholithiasis and underwent ERCP with sphincterotomy and stone extraction today.  Currently denies abdominal pain, nausea, vomiting, or significant change in bowel habits.  His family was also present at the bedside.    Problems Addressed this Visit          Cardiac and Vasculature    Essential hypertension (Chronic)    Relevant Medications    dilTIAZem (CARDIZEM) injection 10 mg (Completed)    dilTIAZem (CARDIZEM) injection 10 mg (Completed)    dilTIAZem (CARDIZEM) 125 mg in 125 mL D5W infusion    Other Relevant Orders    Miscellaneous DME    Permanent atrial fibrillation    Relevant Medications    dilTIAZem (CARDIZEM) injection 10 mg (Completed)    dilTIAZem (CARDIZEM) injection 10 mg (Completed)    dilTIAZem (CARDIZEM) 125 mg in 125 mL D5W infusion    nitroglycerin (NITROSTAT) SL tablet 0.4 mg    Other Relevant Orders    Miscellaneous DME    Coronary artery disease involving native coronary artery of native heart without angina pectoris    Relevant  Medications    dilTIAZem (CARDIZEM) injection 10 mg (Completed)    dilTIAZem (CARDIZEM) injection 10 mg (Completed)    dilTIAZem (CARDIZEM) 125 mg in 125 mL D5W infusion    nitroglycerin (NITROSTAT) SL tablet 0.4 mg    Other Relevant Orders    Miscellaneous DME    Presence of Watchman left atrial appendage closure device    Relevant Orders    Miscellaneous DME       Endocrine and Metabolic    Type 2 diabetes mellitus with stage 3 chronic kidney disease, without long-term current use of insulin    Relevant Medications    dextrose (GLUTOSE) oral gel 15 g    glucagon (GLUCAGEN) injection 1 mg    insulin regular (humuLIN R,novoLIN R) injection 2-7 Units    Other Relevant Orders    Miscellaneous DME       Gastrointestinal Abdominal     * (Principal) Choledocholithiasis - Primary    Relevant Orders    Miscellaneous DME       Genitourinary and Reproductive     Stage 3 chronic kidney disease    Relevant Orders    Miscellaneous DME       Other    Heart failure with mildly reduced ejection fraction (HFmrEF)    Relevant Medications    dilTIAZem (CARDIZEM) injection 10 mg (Completed)    dilTIAZem (CARDIZEM) injection 10 mg (Completed)    dilTIAZem (CARDIZEM) 125 mg in 125 mL D5W infusion    nitroglycerin (NITROSTAT) SL tablet 0.4 mg    Other Relevant Orders    Miscellaneous DME     Other Visit Diagnoses       Atrial fibrillation with RVR        Relevant Medications    dilTIAZem (CARDIZEM) injection 10 mg (Completed)    dilTIAZem (CARDIZEM) injection 10 mg (Completed)    dilTIAZem (CARDIZEM) 125 mg in 125 mL D5W infusion    nitroglycerin (NITROSTAT) SL tablet 0.4 mg    Dementia, unspecified dementia severity, unspecified dementia type, unspecified whether behavioral, psychotic, or mood disturbance or anxiety        Relevant Orders    Miscellaneous DME    Elevated troponin        Cystitis        Relevant Medications    tamsulosin (FLOMAX) 0.4 MG capsule 24 hr capsule    Dysphagia        Relevant Orders    Tissue Pathology Exam     Esophagitis        Relevant Medications    pantoprazole (PROTONIX) EC tablet 40 mg    Other Relevant Orders    Tissue Pathology Exam          Diagnoses         Codes Comments    Choledocholithiasis    -  Primary ICD-10-CM: K80.50  ICD-9-CM: 574.50     Atrial fibrillation with RVR     ICD-10-CM: I48.91  ICD-9-CM: 427.31     Dementia, unspecified dementia severity, unspecified dementia type, unspecified whether behavioral, psychotic, or mood disturbance or anxiety     ICD-10-CM: F03.90  ICD-9-CM: 294.20     Elevated troponin     ICD-10-CM: R79.89  ICD-9-CM: 790.6     Cystitis     ICD-10-CM: N30.90  ICD-9-CM: 595.9     Type 2 diabetes mellitus with stage 3 chronic kidney disease, without long-term current use of insulin, unspecified whether stage 3a or 3b CKD     ICD-10-CM: E11.22, N18.30  ICD-9-CM: 250.40, 585.3     Essential hypertension     ICD-10-CM: I10  ICD-9-CM: 401.9     Permanent atrial fibrillation     ICD-10-CM: I48.21  ICD-9-CM: 427.31     Stage 3 chronic kidney disease, unspecified whether stage 3a or 3b CKD     ICD-10-CM: N18.30  ICD-9-CM: 585.3     Coronary artery disease involving native coronary artery of native heart without angina pectoris     ICD-10-CM: I25.10  ICD-9-CM: 414.01     Presence of Watchman left atrial appendage closure device     ICD-10-CM: Z95.818  ICD-9-CM: V45.09     Heart failure with mildly reduced ejection fraction (HFmrEF)     ICD-10-CM: I50.22  ICD-9-CM: 428.22     Dysphagia     ICD-10-CM: R13.10  ICD-9-CM: 787.20     Esophagitis     ICD-10-CM: K20.90  ICD-9-CM: 530.10             Past Medical History:   Diagnosis Date    Arrhythmia     Atrial fibrillation     Bilateral leg cramps     possible new medication related side effects    Chronic kidney disease     Clotting disorder     pt doesnt know about this    COPD (chronic obstructive pulmonary disease)     Coronary artery disease     Diabetes mellitus     doesnt check sugar    E. coli sepsis 06/23/2022    Enlarged prostate  without lower urinary tract symptoms (luts) 06/20/2016    Full dentures     GERD (gastroesophageal reflux disease)     Gout     Hearing aid worn     bilat prn    History of colonoscopy 09/12/2012    History of radiation therapy 02/24/2023    SBRT LLL lung    [place] (hard of hearing)     hearing aids prn    Hyperlipidemia     Hypertension     Kidney stone     surgery x1    Lung cancer     STEVEN on CPAP     compliant with machine    PAF (paroxysmal atrial fibrillation)     Prostatism     Sleep apnea     CPAP HS    Urinary incontinence     Urinary tract infection     Wears glasses     readers       Past Surgical History:    ATRIAL APPENDAGE EXCLUSION LEFT WITH TRANSESOPHAGEAL ECHOCARDIOGRAM    Procedure: Atrial Appendage Occlusion;  Surgeon: Anthony Mcdaniel MD;  Location:  [facility];  Service: Cardiovascular;  Laterality: N/A;    CARDIAC CATHETERIZATION    Procedure: Left Heart Cath;  Surgeon: Titus Oliveros IV, MD;  Location:  [facility];  Service: Cardiovascular;  Laterality: Left;    CARDIOVERSION    CATARACT EXTRACTION    COLONOSCOPY    CYSTOSCOPY    ENDOSCOPY    possible    KIDNEY STONE SURGERY    x1       Allergies   Allergen Reactions    Xarelto [Rivaroxaban] GI Bleeding     GI bleed    Sglt2 Inhibitors Other (See Comments)     Recurrent UTI    Penicillins Hives     Has tolerated cefepime, ceftriaxone, cefazolin, cefdinir, cefuroxime, cephalexin       No current facility-administered medications on file prior to encounter.     Current Outpatient Medications on File Prior to Encounter   Medication Sig Dispense Refill    allopurinol (ZYLOPRIM) 300 MG tablet Take 1 tablet by mouth Daily. 90 tablet 1    ascorbic acid (VITAMIN C) 1000 MG tablet Take 1 tablet by mouth 2 (Two) Times a Day.      aspirin 81 MG EC tablet Take 1 tablet by mouth Daily. 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. 90 tablet 1    Coenzyme Q10 300 MG capsule Take 1 capsule by mouth Every Night.       donepezil (ARICEPT) 10 MG tablet Take 1 tablet by mouth Every Night. 90 tablet 1    Ferrous Gluconate (IRON) 240 (27 FE) MG tablet Take 1 tablet by mouth Daily.      finasteride (PROSCAR) 5 MG tablet Take 1 tablet by mouth every night at bedtime. 90 tablet 1    furosemide (LASIX) 20 MG tablet Take 1 tablet by mouth Daily. 90 tablet 1    memantine (NAMENDA) 10 MG tablet Take 1 tablet by mouth twice daily 180 tablet 1    metFORMIN (GLUCOPHAGE) 1000 MG tablet Take 1 tablet by mouth 2 (Two) Times a Day. 180 tablet 1    methenamine (HIPREX) 1 g tablet Take 1 tablet by mouth 2 (Two) Times a Day With Meals. 180 tablet 1    metoprolol tartrate (LOPRESSOR) 100 MG tablet Take 1 tablet by mouth 2 (Two) Times a Day. 180 tablet 1    multivitamin with minerals (MULTIVITAMIN MEN 50+ PO) Take 1 tablet by mouth Every Night. Centrum Sliver      omeprazole (priLOSEC) 20 MG capsule Take 1 capsule by mouth once daily 90 capsule 1    Probiotic Product (PROBIOTIC ADVANCED PO) Take 1 tablet by mouth 2 (Two) Times a Day.      sertraline (ZOLOFT) 50 MG tablet Take 1 tablet by mouth once daily 90 tablet 1    tamsulosin (FLOMAX) 0.4 MG capsule 24 hr capsule Take 1 capsule by mouth Daily.      tiotropium bromide-olodaterol (STIOLTO RESPIMAT) 2.5-2.5 MCG/ACT aerosol solution inhaler Inhale 2 puffs Daily. 2 inh once a day 3 each 3    albuterol sulfate  (90 Base) MCG/ACT inhaler Inhale 2 puffs Every 4 (Four) Hours As Needed for Wheezing. 54 g 1    docusate sodium (COLACE) 100 MG capsule Take 2 capsules by mouth At Night As Needed for Constipation.      pravastatin (PRAVACHOL) 40 MG tablet Take 1 tablet by mouth once daily 90 tablet 0    [DISCONTINUED] metOLazone (ZAROXOLYN) 2.5 MG tablet Take 1 tablet by mouth 2 (Two) Times a Week. Take Tuesday and Friday with furosemide 10 tablet 1         Current Facility-Administered Medications:     acetaminophen (TYLENOL) tablet 650 mg, 650 mg, Oral, Q4H PRN **OR** acetaminophen (TYLENOL) 160 MG/5ML  oral solution 650 mg, 650 mg, Oral, Q4H PRN **OR** acetaminophen (TYLENOL) suppository 650 mg, 650 mg, Rectal, Q4H PRN, Anthony Arambula MD    ipratropium (ATROVENT) nebulizer solution 0.5 mg, 0.5 mg, Nebulization, 4x Daily - RT, 0.5 mg at 09/05/24 0859 **AND** arformoterol (BROVANA) nebulizer solution 15 mcg, 15 mcg, Nebulization, BID - RT, Anthony Arambula MD, 15 mcg at 09/05/24 0859    aspirin chewable tablet 81 mg, 81 mg, Oral, Daily, Anthony Arambula MD, 81 mg at 09/05/24 1746    sennosides-docusate (PERICOLACE) 8.6-50 MG per tablet 2 tablet, 2 tablet, Oral, BID PRN **AND** polyethylene glycol (MIRALAX) packet 17 g, 17 g, Oral, Daily PRN **AND** bisacodyl (DULCOLAX) EC tablet 5 mg, 5 mg, Oral, Daily PRN **AND** bisacodyl (DULCOLAX) suppository 10 mg, 10 mg, Rectal, Daily PRN, Anthony Arambula MD    Calcium Replacement - Follow Nurse / BPA Driven Protocol, , Does not apply, PRN, Anthony Arambula MD    [START ON 9/6/2024] cefTRIAXone (ROCEPHIN) 2,000 mg in sodium chloride 0.9 % 100 mL MBP, 2,000 mg, Intravenous, Q24H, Clarence Samaniego MD    dextrose (D50W) (25 g/50 mL) IV injection 25 g, 25 g, Intravenous, Q15 Min PRN, Anthony Arambula MD    dextrose (GLUTOSE) oral gel 15 g, 15 g, Oral, Q15 Min PRN, Anthony Arambula MD    dilTIAZem (CARDIZEM) 125 mg in 125 mL D5W infusion, 5-15 mg/hr, Intravenous, Titrated, Anthony Arambula MD, Last Rate: 15 mL/hr at 09/05/24 1732, 15 mg/hr at 09/05/24 1732    glucagon (GLUCAGEN) injection 1 mg, 1 mg, Intramuscular, Q15 Min PRN, Anthony Arambula MD    insulin regular (humuLIN R,novoLIN R) injection 2-7 Units, 2-7 Units, Subcutaneous, Q6H, Anthony Arambula MD    ipratropium-albuterol (DUO-NEB) nebulizer solution 3 mL, 3 mL, Nebulization, Q4H PRN, Anthony Arambula MD    Magnesium Standard Dose Replacement - Follow Nurse / BPA Driven Protocol, , Does not apply, PRN, Anthony Arambula MD    metroNIDAZOLE (FLAGYL) IVPB 500 mg, 500 mg, Intravenous, Q8H, Clarence Samaniego MD    nitroglycerin  (NITROSTAT) SL tablet 0.4 mg, 0.4 mg, Sublingual, Q5 Min PRRalf SUAREZ John A, MD    pantoprazole (PROTONIX) EC tablet 40 mg, 40 mg, Oral, BID AC, Anthony Arambula MD    Phosphorus Replacement - Follow Nurse / BPA Driven Protocol, , Does not apply, Ralf KENNEDY John A, MD    Potassium Replacement - Follow Nurse / BPA Driven Protocol, , Does not apply, PRRalf SUAREZ John A, MD    sodium chloride 0.9 % flush 10 mL, 10 mL, Intravenous, PRNRalf John A, MD    sodium chloride 0.9 % flush 10 mL, 10 mL, Intravenous, Q12H, Anthony Arambula MD    sodium chloride 0.9 % flush 10 mL, 10 mL, Intravenous, PRRalf SUAREZ John A, MD    sodium chloride 0.9 % infusion 40 mL, 40 mL, Intravenous, PRRalf SUAREZ John A, MD    Family History   Problem Relation Age of Onset    Alzheimer's disease Mother     COPD Father     Lung cancer Father     Cancer Father     Kidney disease Father     No Known Problems Daughter     No Known Problems Daughter     No Known Problems Daughter      Social History     Socioeconomic History    Marital status:    Tobacco Use    Smoking status: Former     Current packs/day: 0.00     Average packs/day: 1 pack/day for 15.0 years (15.0 ttl pk-yrs)     Types: Cigarettes     Start date: 1947     Quit date: 1960     Years since quittin.3     Passive exposure: Past    Smokeless tobacco: Former     Types: Chew     Quit date:     Tobacco comments:     started at 15 yo.   Vaping Use    Vaping status: Never Used    Passive vaping exposure: Yes   Substance and Sexual Activity    Alcohol use: No    Drug use: Never    Sexual activity: Not Currently     Partners: Female       Review of Systems:  Review of Systems   Constitutional:  Negative for chills and fever.   HENT:  Positive for hearing loss. Negative for congestion and sinus pain.    Eyes:  Negative for photophobia and visual disturbance.   Respiratory:  Negative for apnea and stridor.    Cardiovascular:  Negative for chest pain and leg swelling.  "  Gastrointestinal:  Positive for nausea and vomiting. Negative for abdominal distention.   Endocrine: Negative for polyphagia and polyuria.   Genitourinary:  Negative for dysuria, hematuria and urgency.   Musculoskeletal:  Negative for arthralgias and joint swelling.   Skin:  Negative for color change and rash.   Allergic/Immunologic: Negative for immunocompromised state.   Neurological:  Negative for dizziness and syncope.   Hematological:  Negative for adenopathy.   Psychiatric/Behavioral:  Negative for agitation and sleep disturbance. The patient is not hyperactive.      Otherwise the 12 point review of systems is negative.    /86   Pulse 113   Temp 98.1 °F (36.7 °C) (Axillary)   Resp 16   Ht 190.5 cm (75\")   Wt 103 kg (226 lb 6.6 oz)   SpO2 91%   BMI 28.30 kg/m²   Body mass index is 28.3 kg/m².    General: Pleasantly conversant  HEENT: PER, icteric, normal sclerae  Cardiac: regular rhythm,  no audible rubs  Pulmonary: bilateral breath sounds, nonlabored  Abdominal: Soft, no generalized peritonitis, no palpable hepatosplenomegaly  Neurologic: awake, alert, no obvious focal deficits  Extremities: warm, no edema  Skin: no obvious rashes nor worrisome lesions seen     CBC  Results from last 7 days   Lab Units 09/05/24  0713   WBC 10*3/mm3 5.84   HEMOGLOBIN g/dL 13.3   HEMATOCRIT % 40.6   PLATELETS 10*3/mm3 146       CMP  Results from last 7 days   Lab Units 09/05/24  0713 09/04/24  1821   SODIUM mmol/L 139 137   POTASSIUM mmol/L 3.2* 3.6   CHLORIDE mmol/L 101 100   CO2 mmol/L 27.0 27.7   BUN mg/dL 28* 32*   CREATININE mg/dL 0.84 1.09   CALCIUM mg/dL 8.6 9.0   BILIRUBIN mg/dL  --  8.1*   ALK PHOS U/L  --  423*   ALT (SGPT) U/L  --  65*   AST (SGOT) U/L  --  74*   GLUCOSE mg/dL 82 112*       Radiology  Imaging Results (Last 72 Hours)       Procedure Component Value Units Date/Time    FL ERCP pancreatic and biliary ducts [[id number]] Collected: 09/05/24 1551     Updated: 09/05/24 1628    Narrative:      FL " ERCP PANCREATIC AND BILIARY DUCTS    Date of Exam: 9/5/2024 2:17 PM EDT    Indication: ENDOSCOPIC RETROGRADE CHOLANGIOPANCREATOGRAPHY.       Comparison: None available.    Technique:  A series of radiographic digital spot films were obtained in conjunction with an endoscopic catheterization of the biliary and pancreatic ductal system, performed by the gastroenterologist.    Fluoroscopic Time: 2 minutes 48 seconds    Number of Images: 11    Findings:  Dictation is to record 2 minutes and 48 seconds of fluoroscopy time. A total of 11 images obtained show endoscope with side cannula placement, contrast injection of the common duct, apparent balloon sweep, and subsequent plastic stent placement. Please   see the procedure report for full details.      Impression:      Impression:  Fluoroscopy provided during ERCP and stent placement.      Electronically Signed: Oliverio Younger MD    9/5/2024 4:25 PM EDT    Workstation ID: [id number]    MRI abdomen wo contrast mrcp [[id number]] Collected: 09/05/24 0515     Updated: 09/05/24 0522    Narrative:      MRI ABDOMEN WO CONTRAST MRCP    Date of Exam: 9/5/2024 4:42 AM EDT    Indication: elevated LFTS, calculi within the distal common bile duct.     Comparison: CT abdomen pelvis dated September 4, 2024    Technique:  Routine multiplanar/multisequence images of the abdomen were obtained with MRCP sequences without contrast administration.      Findings:  Motion artifact degrades the image quality of this examination.    There are small bilateral pleural effusions with areas of basilar atelectasis better described and seen on CT scan. Within the limitations of motion artifact, the pancreas is normal. The liver parenchyma is unremarkable. There are cysts of the left   kidney. The right kidney is normal.    The gallbladder is mildly distended but there is no evidence of wall thickening or pericholecystic fluid. The common bile duct is nondilated. However, there are several small filling  defects in the distal common bile duct consistent with nonobstructing   choledocholithiasis. The largest stone is about 5 mm. The maximum common bile duct diameter is about 9 mm.      Impression:      Impression:  Choledocholithiasis with several small stones in the distal common bile duct. The largest stone is about 5 mm. The common bile duct is not dilated.        Electronically Signed: Timothy Willoughby MD    9/5/2024 5:19 AM EDT    Workstation ID: [id number]    CT Abdomen Pelvis With Contrast [[id number]] Collected: 09/04/24 1955     Updated: 09/04/24 2009    Narrative:      CT ANGIOGRAM CHEST PULMONARY EMBOLISM, CT ABDOMEN PELVIS W CONTRAST    Date of Exam: 9/4/2024 7:42 PM EDT    Indication: soa.    Comparison: 4/15/2024    Technique: Axial CT images were obtained of the chest and CT of the abdomen and pelvis after the uneventful intravenous administration of 86 cc Isovue-370 IV contrast utilizing pulmonary embolism protocol.  Reconstructed coronal and sagittal images were   also obtained. Automated exposure control and iterative construction methods were used.    Findings: The thyroid gland is normal. The subglottic airway is clear. No aortic dissection. 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 No pulmonary embolus. Small right and trace left pleural effusions. No pericardial effusion.    Liver: 0.8 cm right hepatic cyst.    Spleen:No masses. No perisplenic hematoma.    Pancreas:No pancreatic masses. No evidence of pancreatitis.    Gallbladder and common bile duct: Dilated gallbladder with extrahepatic biliary ductal dilatation. There appears to be material possibly noncalcified calculus within the still common bile duct resulting in partial obstruction.    Adrenal glands:No adrenal masses    Kidneys and ureters: Exophytic 2.3 cm left renal cyst. Exophytic 2 cm left renal cyst. Prominent left extrarenal pelvis and hydroureter on a chronic basis extending to the urinary bladder.     Urinary bladder:No  urinary bladder wall thickening. No bladder masses.    Small bowel:Normal caliber small bowel.    Large bowel:No diverticulosis or diverticulitis. No large bowel masses are appreciated    Appendix: Normal appendix.    GENITOURINARY: Normal prostate    Ascites or pneumoperitoneum:None.    Adenopathy:None present    Osseous structures: Degenerative changes of the hips. The proximal femurs are intact. The pubic bones are intact. The sacroiliac joints are normal. Multilevel degenerative changes of the lumbar spine.    Other findings: None      Impression:      1. There appears to be debris or noncalcified calculi within the distal common bile duct. No evidence of cholecystitis at this time.  2. No pulmonary embolus.  3. Small right and trace left pleural effusions.  4. 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 Cardiology consult recommended.            Electronically Signed: Yoni Rios MD    9/4/2024 8:06 PM EDT    Workstation ID: [id number]    CT Angiogram Chest Pulmonary Embolism [[id number]] Collected: 09/04/24 1955     Updated: 09/04/24 2009    Narrative:      CT ANGIOGRAM CHEST PULMONARY EMBOLISM, CT ABDOMEN PELVIS W CONTRAST    Date of Exam: 9/4/2024 7:42 PM EDT    Indication: soa.    Comparison: 4/15/2024    Technique: Axial CT images were obtained of the chest and CT of the abdomen and pelvis after the uneventful intravenous administration of 86 cc Isovue-370 IV contrast utilizing pulmonary embolism protocol.  Reconstructed coronal and sagittal images were   also obtained. Automated exposure control and iterative construction methods were used.    Findings: The thyroid gland is normal. The subglottic airway is clear. No aortic dissection. 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 No pulmonary embolus. Small right and trace left pleural effusions. No pericardial effusion.    Liver: 0.8 cm right hepatic cyst.    Spleen:No masses. No perisplenic hematoma.    Pancreas:No pancreatic masses. No  evidence of pancreatitis.    Gallbladder and common bile duct: Dilated gallbladder with extrahepatic biliary ductal dilatation. There appears to be material possibly noncalcified calculus within the still common bile duct resulting in partial obstruction.    Adrenal glands:No adrenal masses    Kidneys and ureters: Exophytic 2.3 cm left renal cyst. Exophytic 2 cm left renal cyst. Prominent left extrarenal pelvis and hydroureter on a chronic basis extending to the urinary bladder.     Urinary bladder:No urinary bladder wall thickening. No bladder masses.    Small bowel:Normal caliber small bowel.    Large bowel:No diverticulosis or diverticulitis. No large bowel masses are appreciated    Appendix: Normal appendix.    GENITOURINARY: Normal prostate    Ascites or pneumoperitoneum:None.    Adenopathy:None present    Osseous structures: Degenerative changes of the hips. The proximal femurs are intact. The pubic bones are intact. The sacroiliac joints are normal. Multilevel degenerative changes of the lumbar spine.    Other findings: None      Impression:      1. There appears to be debris or noncalcified calculi within the distal common bile duct. No evidence of cholecystitis at this time.  2. No pulmonary embolus.  3. Small right and trace left pleural effusions.  4. 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 Cardiology consult recommended.            Electronically Signed: Yoni Rios MD    9/4/2024 8:06 PM EDT    Workstation ID: [id number]    CT Head Without Contrast [[id number]] Collected: 09/04/24 1953     Updated: 09/04/24 2006    Narrative:      CT HEAD WO CONTRAST    Date of Exam: 9/4/2024 7:36 PM EDT    Indication: confusion.    Comparison: MRI brain 11/13/2023    Technique: Axial CT images were obtained of the head without contrast administration.  Automated exposure control and iterative construction methods were used.      Findings:  Negative for large territory loss of gray-white differentiation,  acute intracranial hemorrhage, large parenchymal mass, midline shift or hydrocephalus. Stable incidental benign left temporal arachnoid cyst. Mild global parenchymal volume loss stable from   prior. Mild periventricular and subcortical hypodensity suggesting chronic microvascular ischemic change stable from prior. No large extra-axial fluid collection. Symmetric appearing globes. Distal carotid and vertebral artery atherosclerotic plaque.   Layering fluid in the right maxillary sinus with asymmetric mucoperiosteal thickening. This could reflect acute on chronic sinusitis in the right clinical setting. No large mastoid effusion. Negative for depressed skull fracture. Degenerative changes at   the dens.      Impression:      Impression:  1. No acute intracranial findings by CT. Chronic findings detailed above similar to previous MRI brain comparison.  2. Possible acute on chronic sinusitis in the right clinical setting, with layering fluid noted in the right maxillary sinus.        Electronically Signed: Compa Dominguez MD    9/4/2024 7:56 PM EDT    Workstation ID: [id number]    XR Chest 1 View [[id number]] Collected: 09/04/24 1812     Updated: 09/04/24 1816    Narrative:      XR CHEST 1 VW    Date of Exam: 9/4/2024 5:55 PM EDT    Indication: Weak/Dizzy/AMS triage protocol    Comparison: Chest radiograph 6/3/2024    Findings:  Cardiomediastinal silhouette unchanged from prior. Platelike region of atelectasis versus scar left lower lobe similar to prior. Slight elevated left hemidiaphragm similar to prior. No new consolidation, edema, effusion or pneumothorax. Degenerative   related osseous changes. Stable radiographic appearance of the chest since 6/3/2024.      Impression:      Impression:  No acute radiographic findings. Stable appearance of the chest since 6/3/2024 comparison.      Electronically Signed: Compa Dominguez MD    9/4/2024 6:13 PM EDT    Workstation ID: [id number]          Assessment:  Choledocholithiasis  status post ERCP  Cholelithiasis   Hypertension  Diabetes mellitus  Atrial fibrillation  Stage III chronic kidney disease  Coronary artery disease    Plan:  Noted to be jaundice with elevated liver function tests.  Underwent ERCP with stone extraction and sphincterotomy today.  I reviewed his labs, prior imaging, and ERCP images - patent cystic duct.  Most recent T. bili 8.1.  No evidence of acute cholecystitis on CT nor MRI.  Secondary to his age and comorbidities this may be all that he needs currently.  May advance diet as tolerated will follow along.  No plans for acute surgical intervention.    Rancho Fink MD  09/05/24  17:54 EDT       Electronically signed by Rancho Fink MD at 09/05/24 1804       Alma Rosa Rodriguez PA at 09/05/24 1051        Consult Orders    1. Inpatient Gastroenterology Consult [[id number]] ordered by Evangelina Braun APRN at 09/05/24 0574              Attestation signed by Anthony Arambula MD at 09/05/24 6324    I have reviewed this documentation and agree.                  [facility] [place] [facility]astroenterology Consult    Referring Provider: Clarence Samaniego MD     PCP: Pito Way MD    Reason for Consultation: Choledocholithiasis     Chief complaint: Nausea and vomiting     History of present illness:    Darien Camarena is a 88 y.o. male who is admitted with nausea and vomiting.  He states he developed acute nausea and vomiting on Sunday, 9/1.  He had some loose stools, generalized weakness and fatigue.   He denies abdominal pain, fever nor chills.     His daughter noticed he appeared jaundiced and thus brought him to the ER.   Labs showed elevated LFTs in cholestatic pattern with Alk phos of 423, AST 74, ALT 65 and total bilirubin 8.1.   Contrasted CT showed debris in the distal common bile duct.   Subsequent noncontrast MRCP showed choledocholithiasis with several stones in the distal common bile duct.   Common bile duct is 9 mm.       Allergies:  Xarelto [rivaroxaban],  Sglt2 inhibitors, and Penicillins    Scheduled Meds:  ipratropium, 0.5 mg, Nebulization, 4x Daily - RT   And  arformoterol, 15 mcg, Nebulization, BID - RT  aspirin, 81 mg, Oral, Daily  insulin regular, 2-7 Units, Subcutaneous, Q6H  sodium chloride, 10 mL, Intravenous, Q12H         Infusions:  dilTIAZem, 5-15 mg/hr, Last Rate: 15 mg/hr (09/05/24 0805)  sodium chloride, 75 mL/hr, Last Rate: 75 mL/hr (09/05/24 0805)        PRN Meds:    acetaminophen **OR** acetaminophen **OR** acetaminophen    senna-docusate sodium **AND** polyethylene glycol **AND** bisacodyl **AND** bisacodyl    Calcium Replacement - Follow Nurse / BPA Driven Protocol    dextrose    dextrose    glucagon (human recombinant)    ipratropium-albuterol    ipratropium-albuterol    Magnesium Standard Dose Replacement - Follow Nurse / BPA Driven Protocol    nitroglycerin    ondansetron    Phosphorus Replacement - Follow Nurse / BPA Driven Protocol    Potassium Replacement - Follow Nurse / BPA Driven Protocol    sodium chloride    sodium chloride    sodium chloride    Home Meds:  Medications Prior to Admission   Medication Sig Dispense Refill Last Dose    allopurinol (ZYLOPRIM) 300 MG tablet Take 1 tablet by mouth Daily. 90 tablet 1 9/4/2024 at 1700    ascorbic acid (VITAMIN C) 1000 MG tablet Take 1 tablet by mouth 2 (Two) Times a Day.   9/4/2024 at 1700    aspirin 81 MG EC tablet Take 1 tablet by mouth Daily. 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. 90 tablet 1 9/4/2024 at 0500    Coenzyme Q10 300 MG capsule Take 1 capsule by mouth Every Night.   9/4/2024 at 1700    donepezil (ARICEPT) 10 MG tablet Take 1 tablet by mouth Every Night. 90 tablet 1 9/4/2024 at 1700    finasteride (PROSCAR) 5 MG tablet Take 1 tablet by mouth every night at bedtime. 90 tablet 1 9/4/2024 at 1700    furosemide (LASIX) 20 MG tablet Take 1 tablet by mouth Daily. 90 tablet 1 9/4/2024 at 0500    memantine (NAMENDA) 10 MG tablet Take 1 tablet by mouth twice daily 180 tablet 1 9/4/2024  at 0500    metFORMIN (GLUCOPHAGE) 1000 MG tablet Take 1 tablet by mouth 2 (Two) Times a Day. 180 tablet 1 9/4/2024 at 0500    methenamine (HIPREX) 1 g tablet Take 1 tablet by mouth 2 (Two) Times a Day With Meals. 180 tablet 1 9/4/2024 at 0500    metOLazone (ZAROXOLYN) 2.5 MG tablet Take 1 tablet by mouth 2 (Two) Times a Week. Take Tuesday and Friday with furosemide 10 tablet 1 9/4/2024 at 0500    metoprolol tartrate (LOPRESSOR) 100 MG tablet Take 1 tablet by mouth 2 (Two) Times a Day. 180 tablet 1 9/4/2024 at 1700    multivitamin with minerals (MULTIVITAMIN MEN 50+ PO) Take 1 tablet by mouth Every Night. Centrum Sliver   9/4/2024 at 1700    omeprazole (priLOSEC) 20 MG capsule Take 1 capsule by mouth once daily 90 capsule 1 9/4/2024 at 0500    Probiotic Product (PROBIOTIC ADVANCED PO) Take 1 tablet by mouth 2 (Two) Times a Day.   9/4/2024 at 1700    sertraline (ZOLOFT) 50 MG tablet Take 1 tablet by mouth once daily 90 tablet 1 9/4/2024 at 0500    tamsulosin (FLOMAX) 0.4 MG capsule 24 hr capsule Take 1 capsule by mouth Daily.   9/4/2024 at 0500    tiotropium bromide-olodaterol (STIOLTO RESPIMAT) 2.5-2.5 MCG/ACT aerosol solution inhaler Inhale 2 puffs Daily. 2 inh once a day 3 each 3 9/4/2024 at 0500    albuterol sulfate  (90 Base) MCG/ACT inhaler Inhale 2 puffs Every 4 (Four) Hours As Needed for Wheezing. 54 g 1 More than a month    docusate sodium (COLACE) 100 MG capsule Take 2 capsules by mouth At Night As Needed for Constipation.   More than a month    Ferrous Gluconate (IRON) 240 (27 FE) MG tablet Take 1 tablet by mouth Daily.       pravastatin (PRAVACHOL) 40 MG tablet Take 1 tablet by mouth once daily 90 tablet 0        ROS: Review of Systems   Constitutional:  Positive for fatigue.   Eyes: Negative.    Respiratory: Negative.     Cardiovascular: Negative.    Gastrointestinal:  Positive for nausea and vomiting. Negative for abdominal pain.   Endocrine: Negative.    Skin:  Positive for color change.        PAST MED HX:  Past Medical History:   Diagnosis Date    Arrhythmia     Atrial fibrillation     Bilateral leg cramps     possible new medication related side effects    Chronic kidney disease     Clotting disorder     pt doesnt know about this    COPD (chronic obstructive pulmonary disease)     Coronary artery disease     Diabetes mellitus     doesnt check sugar    E. coli sepsis 06/23/2022    Enlarged prostate without lower urinary tract symptoms (luts) 06/20/2016    Full dentures     GERD (gastroesophageal reflux disease)     Gout     Hearing aid worn     bilat prn    History of colonoscopy 09/12/2012    History of radiation therapy 02/24/2023    SBRT LLL lung    [place] (hard of hearing)     hearing aids prn    Hyperlipidemia     Hypertension     Kidney stone     surgery x1    Lung cancer     STEVEN on CPAP     compliant with machine    PAF (paroxysmal atrial fibrillation)     Prostatism     Sleep apnea     CPAP HS    Urinary incontinence     Urinary tract infection     Wears glasses     readers       PAST SURG HX:  Past Surgical History:   Procedure Laterality Date    ATRIAL APPENDAGE EXCLUSION LEFT WITH TRANSESOPHAGEAL ECHOCARDIOGRAM N/A 11/28/2023    Procedure: Atrial Appendage Occlusion;  Surgeon: Anthony Mcdaniel MD;  Location:  [facility];  Service: Cardiovascular;  Laterality: N/A;    CARDIAC CATHETERIZATION Left 09/29/2022    Procedure: Left Heart Cath;  Surgeon: Titus Oliveros IV, MD;  Location:  [facility];  Service: Cardiovascular;  Laterality: Left;    CARDIOVERSION      CATARACT EXTRACTION Bilateral     COLONOSCOPY      CYSTOSCOPY      ENDOSCOPY      possible    KIDNEY STONE SURGERY      x1       FAM HX:  Family History   Problem Relation Age of Onset    Alzheimer's disease Mother     COPD Father     Lung cancer Father     Cancer Father     Kidney disease Father     No Known Problems Daughter     No Known Problems Daughter     No Known Problems Daughter   "      SOC HX:  Social History     Socioeconomic History    Marital status:    Tobacco Use    Smoking status: Former     Current packs/day: 0.00     Average packs/day: 1 pack/day for 15.0 years (15.0 ttl pk-yrs)     Types: Cigarettes     Start date: 1947     Quit date: 1960     Years since quittin.3     Passive exposure: Past    Smokeless tobacco: Former     Types: Chew     Quit date:     Tobacco comments:     started at 13 yo.   Vaping Use    Vaping status: Never Used    Passive vaping exposure: Yes   Substance and Sexual Activity    Alcohol use: No    Drug use: Never    Sexual activity: Not Currently     Partners: Female       PHYSICAL EXAM  BP (!) 131/104 (BP Location: Right arm, Patient Position: Sitting)   Pulse 103   Temp 97.3 °F (36.3 °C) (Temporal)   Resp 18   Ht 190.5 cm (75\")   Wt 103 kg (226 lb 6.6 oz)   SpO2 95%   BMI 28.30 kg/m²   Wt Readings from Last 3 Encounters:   24 103 kg (226 lb 6.6 oz)   24 108 kg (239 lb)   24 99.3 kg (219 lb)   ,body mass index is 28.3 kg/m².  Physical Exam  Constitutional:       General: He is not in acute distress.     Appearance: He is ill-appearing.   Eyes:      General: Scleral icterus present.   Cardiovascular:      Rate and Rhythm: Tachycardia present. Rhythm irregular.   Pulmonary:      Effort: Pulmonary effort is normal. No respiratory distress.   Abdominal:      General: Bowel sounds are normal. There is no distension.      Palpations: Abdomen is soft.      Tenderness: There is no abdominal tenderness. There is no guarding.   Skin:     Coloration: Skin is jaundiced.   Neurological:      Mental Status: He is alert and oriented to person, place, and time.   Psychiatric:         Mood and Affect: Mood normal.         Behavior: Behavior normal.       Results Review:   I reviewed the patient's new clinical results.    Lab Results   Component Value Date    WBC 5.84 2024    HGB 13.3 2024    HGB 13.3 2024    " HGB 10.4 (L) 06/03/2024    HCT 40.6 09/05/2024    MCV 90.2 09/05/2024     09/05/2024       Lab Results   Component Value Date    INR 1.38 (H) 09/05/2024    INR 1.34 (H) 09/04/2024    INR 1.56 (H) 01/03/2024     Lab Results   Component Value Date    GLUCOSE 82 09/05/2024    BUN 28 (H) 09/05/2024    CREATININE 0.84 09/05/2024    EGFRIFNONA 79 12/16/2021    BCR 33.3 (H) 09/05/2024     09/05/2024    K 3.2 (L) 09/05/2024    CO2 27.0 09/05/2024    CALCIUM 8.6 09/05/2024    PROTENTOTREF 6.2 05/13/2024    ALBUMIN 3.1 (L) 09/04/2024    ALKPHOS 423 (H) 09/04/2024    BILITOT 8.1 (H) 09/04/2024    ALT 65 (H) 09/04/2024    AST 74 (H) 09/04/2024     MRI abdomen without contrast, MRCP:  Findings:  Motion artifact degrades the image quality of this examination.   There are small bilateral pleural effusions with areas of basilar atelectasis better described and seen on CT scan. Within the limitations of motion artifact, the pancreas is normal. The liver parenchyma is unremarkable. There are cysts of the left kidney. The right kidney is normal.   The gallbladder is mildly distended but there is no evidence of wall thickening or pericholecystic fluid. The common bile duct is nondilated. However, there are several small filling defects in the distal common bile duct consistent with nonobstructing choledocholithiasis. The largest stone is about 5 mm. The maximum common bile duct diameter is about 9 mm.   IMPRESSION:  Impression:  Choledocholithiasis with several small stones in the distal common bile duct. The largest stone is about 5 mm. The common bile duct is not dilated.    CT Abdomen/Pelvis with contrast and CTA chest :  Indication: soa.   Comparison: 4/15/2024   Technique: Axial CT images were obtained of the chest and CT of the abdomen and pelvis after the uneventful intravenous administration of 86 cc Isovue-370 IV contrast utilizing pulmonary embolism protocol.  Reconstructed coronal and sagittal images were also  obtained. Automated exposure control and iterative construction methods were used.   Findings: The thyroid gland is normal. The subglottic airway is clear. No aortic dissection. 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 No pulmonary embolus. Small right and trace left pleural effusions. No pericardial effusion.   Liver: 0.8 cm right hepatic cyst.   Spleen:No masses. No perisplenic hematoma.   Pancreas:No pancreatic masses. No evidence of pancreatitis.   Gallbladder and common bile duct: Dilated gallbladder with extrahepatic biliary ductal dilatation. There appears to be material possibly noncalcified calculus within the still common bile duct resulting in partial obstruction.   Adrenal glands:No adrenal masses   Kidneys and ureters: Exophytic 2.3 cm left renal cyst. Exophytic 2 cm left renal cyst. Prominent left extrarenal pelvis and hydroureter on a chronic basis extending to the urinary bladder.    Urinary bladder:No urinary bladder wall thickening. No bladder masses.   Small bowel:Normal caliber small bowel.   Large bowel:No diverticulosis or diverticulitis. No large bowel masses are appreciated   Appendix: Normal appendix.   GENITOURINARY: Normal prostate   Ascites or pneumoperitoneum:None.   Adenopathy:None present   Osseous structures: Degenerative changes of the hips. The proximal femurs are intact. The pubic bones are intact. The sacroiliac joints are normal. Multilevel degenerative changes of the lumbar spine.   Other findings: None   IMPRESSION:  1. There appears to be debris or noncalcified calculi within the distal common bile duct. No evidence of cholecystitis at this time.  2. No pulmonary embolus.  3. Small right and trace left pleural effusions.  4. 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 Cardiology consult recommended.    ASSESSMENTS/PLANS    Choledocholithiasis with biliary obstruction  Nausea and vomiting, secondary to above  Cholestatic hepatitis due to # 1   Atrial  fibrillation with RVR, evaluated by cardiology.   S/p Watchmen device.       >> Recommend ERCP today.  Discussed risks of bleeding, perforation, infection, pancreatitis and failed cannulation.  Patient elects to proceed.     >> NPO    I discussed the patient's findings and my recommendations with patient    BRIGETTE Gonzales  09/05/24  10:51 EDT      Electronically signed by Anthony Arambula MD at 09/05/24 [id number]

## 2024-09-06 NOTE — CONSULTS
Clinical Nutrition Assessment     Patient Name: Darien Camarena  YOB: 1935  MRN: [id number]  Date of Encounter: 09/06/24 12:22 EDT  Admission date: 9/4/2024  Reason for Visit: Admission assessment, Consult    Assessment   Nutrition Assessment   Admission Diagnosis:  Choledocholithiasis [K80.50]    Problem List:    Choledocholithiasis    Type 2 diabetes mellitus with stage 3 chronic kidney disease, without long-term current use of insulin    Hyperlipidemia LDL goal <100    Essential hypertension    Permanent atrial fibrillation    Stage 3 chronic kidney disease    Coronary artery disease involving native coronary artery of native heart without angina pectoris    Presence of Watchman left atrial appendage closure device    Heart failure with mildly reduced ejection fraction (HFmrEF)      PMH:   He  has a past medical history of Arrhythmia, Atrial fibrillation, Bilateral leg cramps, Chronic kidney disease, Clotting disorder, COPD (chronic obstructive pulmonary disease), Coronary artery disease, Diabetes mellitus, E. coli sepsis (06/23/2022), Enlarged prostate without lower urinary tract symptoms (luts) (06/20/2016), Full dentures, GERD (gastroesophageal reflux disease), Gout, Hearing aid worn, History of colonoscopy (09/12/2012), History of radiation therapy (02/24/2023), Platinum (hard of hearing), Hyperlipidemia, Hypertension, Kidney stone, Lung cancer, STEVEN on CPAP, PAF (paroxysmal atrial fibrillation), Prostatism, Sleep apnea, Urinary incontinence, Urinary tract infection, and Wears glasses.    PSH:  He  has a past surgical history that includes Kidney stone surgery; Cataract extraction (Bilateral); Cardioversion; Cardiac catheterization (Left, 09/29/2022); Cystoscopy; Colonoscopy; Esophagogastroduodenoscopy; and Atrial Appendage Exclusion Left (N/A, 11/28/2023).    Applicable Nutrition History:   9/5-ERCP w/sphincterotomy and stone extraction.     Anthropometrics     Height: Height: 190.5 cm  "(75\")  Last Filed Weight: Weight: 105 kg (231 lb 0.7 oz) (09/06/24 0400)  Method: Weight Method: Bed scale  BMI: BMI (Calculated): 28.9    UBW: 255lbs per EMR   Weight change: weight loss of 8 lbs (3.4%) over 2 week(s)    Significant?  No   Weight       Weight (kg) Weight (lbs) Weight Method Visit Report   2/1/2024 116.574 kg  257 lb   --    2/8/2024 116.574 kg  257 lb   --    3/6/2024 118.57 kg  261 lb 6.4 oz   --     118.389 kg  261 lb   --        --        --    3/7/2024 116.121 kg  256 lb   --    4/15/2024 115.803 kg  255 lb 4.8 oz   --    4/17/2024 114.93 kg  253 lb 6 oz   --     118.842 kg  262 lb   --        --        --    5/7/2024 115.667 kg  255 lb   --    5/8/2024 115.667 kg  255 lb   --    5/13/2024 115.667 kg  255 lb      6/3/2024 117.992 kg  260 lb 2 oz   --    6/10/2024 110.723 kg  244 lb 1.6 oz   --     117.935 kg  260 lb   --    6/19/2024 99.338 kg  219 lb   --    8/22/2024 108.41 kg  239 lb   --    9/4/2024 105 kg  231 lb 7.7 oz  Stated     9/5/2024 102.7 kg  226 lb 6.6 oz       106.7 kg  235 lb 3.7 oz      9/6/2024 104.8 kg  231 lb 0.7 oz  Bed scale         Nutrition Focused Physical Exam    Date: 9/6    Patient meets criteria for malnutrition diagnosis, see MSA note.     Subjective   Reported/Observed/Food/Nutrition Related History:     Pt reports poor po intake x 2-3 weeks. Pt tells me he has not been eating well 2/2 taste changes and upset stomach. Pt is unsure of current wt but states 250lbs is a UBW and suspects he has lost 15-20lbs in the past 2-3 weeks as well. Pt agreeable to ONS. Pt states he has dentures and they fit well, denies chewing or swallowing difficulties. NKFA.     Current Nutrition Prescription   PO: Diet: Liquid; Clear Liquid; Fluid Consistency: Thin (IDDSI 0)  Oral Nutrition Supplement:   Intake: Insufficient data pt reports tolerated CLD breakfast tray    Assessment & Plan   Nutrition Diagnosis   Date:  9/6            Updated:    Problem Malnutrition acute severe   Etiology " Energy in < energy out 2/2 taste changes, upset stomach   Signs/Symptoms Moderate muscle wasting and subcutaneous fat loss, po intake <75% EEN x >/=7 days   Status: New    Date:   9/6  Updated:     Problem Inadequate energy intake   Etiology Choledocholithiasis s/p ERCP   Signs/Symptoms On CLD   Status: New    Goal / Objectives:   Nutrition to support treatment and Establish PO, Tolerate PO, Advance diet as medically feasible/appropriate      Nutrition Intervention      Follow treatment progress, Care plan reviewed, Encourage intake, Supplement provided    Encourage po intake and supplement use 2/2 malnutrition (see MSA)  Ordered Ensure Apple while on CLD  Will adjust ONS to Boost (vanilla) once diet advanced    Monitoring/Evaluation:   Per protocol, PO intake, Supplement intake, Weight, GI status    Mayela Olivares, MS,RD,LD  Time Spent:30

## 2024-09-06 NOTE — PROGRESS NOTES
"GI Daily Progress Note  Subjective:    Chief Complaint:  Follow up choledocholithiasis and cholangitis     Mr. Camarena states he feels very well today.  Denies any abdominal pain.  Tolerating clear liquids     Objective:    /89 (BP Location: Right arm, Patient Position: Lying)   Pulse (!) 122   Temp 98.4 °F (36.9 °C) (Oral)   Resp 18   Ht 190.5 cm (75\")   Wt 105 kg (231 lb 0.7 oz)   SpO2 96%   BMI 28.88 kg/m²     Physical Exam  Constitutional:       General: He is not in acute distress.     Appearance: He is ill-appearing.   Eyes:      General: Scleral icterus present.   Cardiovascular:      Rate and Rhythm: Tachycardia present. Rhythm irregular.   Abdominal:      General: Bowel sounds are normal.      Palpations: Abdomen is soft.      Tenderness: There is no abdominal tenderness.   Skin:     Coloration: Skin is jaundiced.   Neurological:      Mental Status: He is alert.       Lab  Lab Results   Component Value Date    WBC 4.82 09/06/2024    HGB 12.9 (L) 09/06/2024    HGB 13.3 09/05/2024    HGB 13.3 09/04/2024    MCV 89.2 09/06/2024     09/06/2024    INR 1.38 (H) 09/05/2024    INR 1.34 (H) 09/04/2024    INR 1.56 (H) 01/03/2024    INR 1.45 (H) 11/29/2023    INR 1.92 (H) 06/21/2022     Lab Results   Component Value Date    GLUCOSE 125 (H) 09/06/2024    BUN 23 09/06/2024    CREATININE 0.68 (L) 09/06/2024    EGFRIFNONA 79 12/16/2021    BCR 33.8 (H) 09/06/2024     09/06/2024    K 3.8 09/06/2024    CO2 24.0 09/06/2024    CALCIUM 8.9 09/06/2024    PROTENTOTREF 6.2 05/13/2024    ALBUMIN 2.9 (L) 09/06/2024    ALKPHOS 389 (H) 09/06/2024    BILITOT 3.7 (H) 09/06/2024    ALT 42 (H) 09/06/2024    AST 29 09/06/2024     Assessment:    Choledocholithiasis with biliary obstruction s/p ERCP yesterday with stone, sludge removal.   Temporary plastic biliary stent placed.     Cholangitis  Severe erosive esophagitis   Atrial fibrillation with RVR s/p Watchman device     Plan:    LFTs trending downward.  Patient " tolerated ERCP well.      >> Complete antibiotic course for cholangitis, may transition to PO at discharge to complete 7 day course  >> Pantoprazole 40 mg BID   >> Repeat ERCP and EGD in 8 weeks to remove biliary stent and evaluate healing of esophagitis     Will sign off.  Please call for any questions or concerns.       BRIGETTE Gonzales  09/06/24  15:26 EDT

## 2024-09-06 NOTE — NURSING NOTE
Pt is alert and oriented. Pt wears 2 L via NC at night for sleep apnea. Pt is a. Fib. With rates between 110s-120s. Cardizem gtt running at 12.5 mg/hr. Pt was in and out cathed at 0040 and had 300 mL of urine. Pt was bladder scanned at 0500 and had 0 mL present. Bed is in lowest position, bed alarm is on and audible, call light within reach, and no other requests at this time.

## 2024-09-07 LAB
ALBUMIN SERPL-MCNC: 2.9 G/DL (ref 3.5–5.2)
ALBUMIN/GLOB SERPL: 1 G/DL
ALP SERPL-CCNC: 345 U/L (ref 39–117)
ALT SERPL W P-5'-P-CCNC: 36 U/L (ref 1–41)
ANION GAP SERPL CALCULATED.3IONS-SCNC: 11 MMOL/L (ref 5–15)
AST SERPL-CCNC: 25 U/L (ref 1–40)
BASOPHILS # BLD AUTO: 0.01 10*3/MM3 (ref 0–0.2)
BASOPHILS NFR BLD AUTO: 0.1 % (ref 0–1.5)
BILIRUB SERPL-MCNC: 2.7 MG/DL (ref 0–1.2)
BUN SERPL-MCNC: 22 MG/DL (ref 8–23)
BUN/CREAT SERPL: 30.1 (ref 7–25)
CALCIUM SPEC-SCNC: 9 MG/DL (ref 8.6–10.5)
CHLORIDE SERPL-SCNC: 100 MMOL/L (ref 98–107)
CO2 SERPL-SCNC: 25 MMOL/L (ref 22–29)
CREAT SERPL-MCNC: 0.73 MG/DL (ref 0.76–1.27)
DEPRECATED RDW RBC AUTO: 64.2 FL (ref 37–54)
EGFRCR SERPLBLD CKD-EPI 2021: 88.1 ML/MIN/1.73
EOSINOPHIL # BLD AUTO: 0 10*3/MM3 (ref 0–0.4)
EOSINOPHIL NFR BLD AUTO: 0 % (ref 0.3–6.2)
ERYTHROCYTE [DISTWIDTH] IN BLOOD BY AUTOMATED COUNT: 19.1 % (ref 12.3–15.4)
GLOBULIN UR ELPH-MCNC: 2.8 GM/DL
GLUCOSE BLDC GLUCOMTR-MCNC: 102 MG/DL (ref 70–130)
GLUCOSE BLDC GLUCOMTR-MCNC: 128 MG/DL (ref 70–130)
GLUCOSE BLDC GLUCOMTR-MCNC: 167 MG/DL (ref 70–130)
GLUCOSE BLDC GLUCOMTR-MCNC: 175 MG/DL (ref 70–130)
GLUCOSE SERPL-MCNC: 146 MG/DL (ref 65–99)
HCT VFR BLD AUTO: 37.4 % (ref 37.5–51)
HGB BLD-MCNC: 12 G/DL (ref 13–17.7)
IMM GRANULOCYTES # BLD AUTO: 0.04 10*3/MM3 (ref 0–0.05)
IMM GRANULOCYTES NFR BLD AUTO: 0.5 % (ref 0–0.5)
LYMPHOCYTES # BLD AUTO: 0.73 10*3/MM3 (ref 0.7–3.1)
LYMPHOCYTES NFR BLD AUTO: 8.4 % (ref 19.6–45.3)
MCH RBC QN AUTO: 29.4 PG (ref 26.6–33)
MCHC RBC AUTO-ENTMCNC: 32.1 G/DL (ref 31.5–35.7)
MCV RBC AUTO: 91.7 FL (ref 79–97)
MONOCYTES # BLD AUTO: 0.48 10*3/MM3 (ref 0.1–0.9)
MONOCYTES NFR BLD AUTO: 5.5 % (ref 5–12)
NEUTROPHILS NFR BLD AUTO: 7.47 10*3/MM3 (ref 1.7–7)
NEUTROPHILS NFR BLD AUTO: 85.5 % (ref 42.7–76)
NRBC BLD AUTO-RTO: 0 /100 WBC (ref 0–0.2)
PLATELET # BLD AUTO: 176 10*3/MM3 (ref 140–450)
PMV BLD AUTO: 11.1 FL (ref 6–12)
POTASSIUM SERPL-SCNC: 4 MMOL/L (ref 3.5–5.2)
PROT SERPL-MCNC: 5.7 G/DL (ref 6–8.5)
RBC # BLD AUTO: 4.08 10*6/MM3 (ref 4.14–5.8)
SODIUM SERPL-SCNC: 136 MMOL/L (ref 136–145)
WBC NRBC COR # BLD AUTO: 8.73 10*3/MM3 (ref 3.4–10.8)

## 2024-09-07 PROCEDURE — 25010000002 CEFTRIAXONE PER 250 MG: Performed by: INTERNAL MEDICINE

## 2024-09-07 PROCEDURE — 82948 REAGENT STRIP/BLOOD GLUCOSE: CPT

## 2024-09-07 PROCEDURE — 94799 UNLISTED PULMONARY SVC/PX: CPT

## 2024-09-07 PROCEDURE — 94664 DEMO&/EVAL PT USE INHALER: CPT

## 2024-09-07 PROCEDURE — 85025 COMPLETE CBC W/AUTO DIFF WBC: CPT | Performed by: INTERNAL MEDICINE

## 2024-09-07 PROCEDURE — 63710000001 INSULIN REGULAR HUMAN PER 5 UNITS: Performed by: INTERNAL MEDICINE

## 2024-09-07 PROCEDURE — 80053 COMPREHEN METABOLIC PANEL: CPT | Performed by: INTERNAL MEDICINE

## 2024-09-07 PROCEDURE — 99231 SBSQ HOSP IP/OBS SF/LOW 25: CPT | Performed by: INTERNAL MEDICINE

## 2024-09-07 PROCEDURE — 99232 SBSQ HOSP IP/OBS MODERATE 35: CPT | Performed by: INTERNAL MEDICINE

## 2024-09-07 PROCEDURE — 25010000002 METRONIDAZOLE 500 MG/100ML SOLUTION: Performed by: INTERNAL MEDICINE

## 2024-09-07 RX ORDER — METOPROLOL TARTRATE 100 MG
100 TABLET ORAL EVERY 12 HOURS SCHEDULED
Status: DISCONTINUED | OUTPATIENT
Start: 2024-09-07 | End: 2024-09-08 | Stop reason: HOSPADM

## 2024-09-07 RX ADMIN — METOPROLOL TARTRATE 50 MG: 50 TABLET, FILM COATED ORAL at 08:20

## 2024-09-07 RX ADMIN — PANTOPRAZOLE SODIUM 40 MG: 40 TABLET, DELAYED RELEASE ORAL at 18:26

## 2024-09-07 RX ADMIN — Medication 10 ML: at 20:25

## 2024-09-07 RX ADMIN — Medication 10 MG: at 20:24

## 2024-09-07 RX ADMIN — INSULIN HUMAN 2 UNITS: 100 INJECTION, SOLUTION PARENTERAL at 00:09

## 2024-09-07 RX ADMIN — Medication 10 MG/HR: at 08:06

## 2024-09-07 RX ADMIN — PANTOPRAZOLE SODIUM 40 MG: 40 TABLET, DELAYED RELEASE ORAL at 06:34

## 2024-09-07 RX ADMIN — INSULIN HUMAN 2 UNITS: 100 INJECTION, SOLUTION PARENTERAL at 12:52

## 2024-09-07 RX ADMIN — SODIUM CHLORIDE 2000 MG: 900 INJECTION INTRAVENOUS at 20:23

## 2024-09-07 RX ADMIN — METOPROLOL TARTRATE 100 MG: 100 TABLET, FILM COATED ORAL at 20:24

## 2024-09-07 RX ADMIN — ASPIRIN 81 MG 81 MG: 81 TABLET ORAL at 08:21

## 2024-09-07 RX ADMIN — IPRATROPIUM BROMIDE 0.5 MG: 0.5 SOLUTION RESPIRATORY (INHALATION) at 09:07

## 2024-09-07 RX ADMIN — ARFORMOTEROL TARTRATE 15 MCG: 15 SOLUTION RESPIRATORY (INHALATION) at 09:07

## 2024-09-07 RX ADMIN — IPRATROPIUM BROMIDE 0.5 MG: 0.5 SOLUTION RESPIRATORY (INHALATION) at 13:58

## 2024-09-07 RX ADMIN — URSODIOL 300 MG: 300 CAPSULE ORAL at 21:03

## 2024-09-07 RX ADMIN — Medication 10 ML: at 08:21

## 2024-09-07 RX ADMIN — METRONIDAZOLE 500 MG: 5 INJECTION, SOLUTION INTRAVENOUS at 12:52

## 2024-09-07 RX ADMIN — METRONIDAZOLE 500 MG: 5 INJECTION, SOLUTION INTRAVENOUS at 20:24

## 2024-09-07 RX ADMIN — METRONIDAZOLE 500 MG: 5 INJECTION, SOLUTION INTRAVENOUS at 03:57

## 2024-09-07 RX ADMIN — URSODIOL 300 MG: 300 CAPSULE ORAL at 08:20

## 2024-09-07 RX ADMIN — ARFORMOTEROL TARTRATE 15 MCG: 15 SOLUTION RESPIRATORY (INHALATION) at 20:58

## 2024-09-07 RX ADMIN — IPRATROPIUM BROMIDE 0.5 MG: 0.5 SOLUTION RESPIRATORY (INHALATION) at 20:58

## 2024-09-07 NOTE — NURSING NOTE
Pt is alert and oriented. Pt wears 2 L via NC at night. A. Fib with rates between 100-120s.  Cardizem running at 10 mg/hr. Pt was in and out cathed at 2200 and had 650 mL removed. Bed is in lowest position, bed alarm active and audible, call light within reach, and no other requests at this time.

## 2024-09-07 NOTE — PROGRESS NOTES
[facility] Medicine Services  PROGRESS NOTE    Patient Name: Darien Camarena  : 1935  MRN: [id number]    Date of Admission: 2024  Primary Care Physician: Pito Way MD    Subjective   Subjective     CC:  Abd pain    HPI:  Patient seen and examined this morning.  Feeling great.  No abdominal pain.  No fever or chills.  PT and OT to see to decide about disposition.  No family at bedside.  Otherwise patient does not have any acute complaints    Objective   Objective     Vital Signs:   Temp:  [97.4 °F (36.3 °C)-98.4 °F (36.9 °C)] 97.4 °F (36.3 °C)  Heart Rate:  [] 111  Resp:  [16-18] 16  BP: (103-130)/() 128/105  Flow (L/min):  [2] 2     Physical Exam:  General: Comfortable, not in distress, conversant and cooperative  Head: Atraumatic and normocephalic  Eyes: No Icterus. No pallor  Ears:  Ears appear intact with no abnormalities noted  Throat: No oral lesions, no thrush  Neck: Supple, trachea midline  Lungs: Clear to auscultation bilaterally, equal air entry, no wheezing or crackles  Heart:  Normal S1 and S2, no murmur, no gallop, No JVD, no lower extremity swelling  Abdomen:  Soft, no tenderness, no organomegaly, normal bowel sounds, no organomegaly  Extremities: pulses equal bilaterally  Skin: No bleeding, bruising or rash, normal skin turgor and elasticity  Neurologic: Cranial nerves appear intact with no evidence of facial asymmetry, normal motor and sensory functions in all 4 extremities  Psych: Alert and oriented x 3, normal mood    Results Reviewed:  LAB RESULTS:      Lab 24  0430 24  0910 24  0713 24  0712 24  1821   WBC 8.73 4.82 5.84  --  7.14   HEMOGLOBIN 12.0* 12.9* 13.3  --  13.3   HEMATOCRIT 37.4* 38.8 40.6  --  41.6   PLATELETS 176 164 146  --  182   NEUTROS ABS 7.47* 4.20 4.31  --  5.81   IMMATURE GRANS (ABS) 0.04 0.01 0.03  --  0.01   LYMPHS ABS 0.73 0.45* 0.77  --  0.73   MONOS ABS 0.48 0.15 0.44  --  0.51    EOS ABS 0.00 0.00 0.27  --  0.07   MCV 91.7 89.2 90.2  --  89.5   PROTIME  --   --   --  17.1* 17.2*   APTT  --   --   --   --  34.0*         Lab 09/07/24  0430 09/06/24  0910 09/05/24  0713 09/04/24  1821   SODIUM 136 138 139 137   POTASSIUM 4.0 3.8 3.2* 3.6   CHLORIDE 100 100 101 100   CO2 25.0 24.0 27.0 27.7   ANION GAP 11.0 14.0 11.0 9.3   BUN 22 23 28* 32*   CREATININE 0.73* 0.68* 0.84 1.09   EGFR 88.1 90.0 84.4 65.7   GLUCOSE 146* 125* 82 112*   CALCIUM 9.0 8.9 8.6 9.0   MAGNESIUM  --  1.6  --  1.6   PHOSPHORUS  --  3.0  --   --          Lab 09/07/24  0430 09/06/24  0910 09/04/24  1821   TOTAL PROTEIN 5.7* 6.5 6.4   ALBUMIN 2.9* 2.9* 3.1*   GLOBULIN 2.8 3.6 3.3   ALT (SGPT) 36 42* 65*   AST (SGOT) 25 29 74*   BILIRUBIN 2.7* 3.7* 8.1*   ALK PHOS 345* 389* 423*   LIPASE  --   --  18         Lab 09/05/24  0712 09/04/24 2026 09/04/24  1821   HSTROP T  --  27* 30*   PROTIME 17.1*  --  17.2*   INR 1.38*  --  1.34*                 Brief Urine Lab Results  (Last result in the past 365 days)        Color   Clarity   Blood   Leuk Est   Nitrite   Protein   CREAT   Urine HCG        09/04/24 2148 Yellow   Clear   Negative   Small (1+)   Negative   Trace                   Microbiology Results Abnormal       None            FL ERCP pancreatic and biliary ducts    Result Date: 9/5/2024  FL ERCP PANCREATIC AND BILIARY DUCTS Date of Exam: 9/5/2024 2:17 PM EDT Indication: ENDOSCOPIC RETROGRADE CHOLANGIOPANCREATOGRAPHY.   Comparison: None available. Technique:  A series of radiographic digital spot films were obtained in conjunction with an endoscopic catheterization of the biliary and pancreatic ductal system, performed by the gastroenterologist. Fluoroscopic Time: 2 minutes 48 seconds Number of Images: 11 Findings: Dictation is to record 2 minutes and 48 seconds of fluoroscopy time. A total of 11 images obtained show endoscope with side cannula placement, contrast injection of the common duct, apparent balloon sweep, and  subsequent plastic stent placement. Please see the procedure report for full details.     Impression: Impression: Fluoroscopy provided during ERCP and stent placement. Electronically Signed: Oliverio Younger MD  9/5/2024 4:25 PM EDT  Workstation ID: [id number]     Results for orders placed during the hospital encounter of 01/25/24    Adult Transthoracic Echo Limited W/ Cont if Necessary Per Protocol    Interpretation Summary    Left ventricular systolic function is low normal. Calculated left ventricular EF = 47.8% Left ventricular ejection fraction appears to be 46 - 50%.      Current medications:  Scheduled Meds:ipratropium, 0.5 mg, Nebulization, 4x Daily - RT   And  arformoterol, 15 mcg, Nebulization, BID - RT  aspirin, 81 mg, Oral, Daily  cefTRIAXone, 2,000 mg, Intravenous, Q24H  insulin regular, 2-7 Units, Subcutaneous, Q6H  melatonin, 10 mg, Oral, Nightly  metoprolol tartrate, 50 mg, Oral, Q12H  metroNIDAZOLE, 500 mg, Intravenous, Q8H  pantoprazole, 40 mg, Oral, BID AC  sodium chloride, 10 mL, Intravenous, Q12H  ursodiol, 300 mg, Oral, BID      Continuous Infusions:dilTIAZem, 5-15 mg/hr, Last Rate: 10 mg/hr (09/07/24 0806)      PRN Meds:.  acetaminophen **OR** acetaminophen **OR** acetaminophen    senna-docusate sodium **AND** polyethylene glycol **AND** bisacodyl **AND** bisacodyl    Calcium Replacement - Follow Nurse / BPA Driven Protocol    dextrose    dextrose    glucagon (human recombinant)    ipratropium-albuterol    Magnesium Standard Dose Replacement - Follow Nurse / BPA Driven Protocol    nitroglycerin    Phosphorus Replacement - Follow Nurse / BPA Driven Protocol    Potassium Replacement - Follow Nurse / BPA Driven Protocol    sodium chloride    sodium chloride    sodium chloride    Assessment & Plan   Assessment & Plan     Active Hospital Problems    Diagnosis  POA    **Choledocholithiasis [K80.50]  Yes    Severe malnutrition [E43]  Yes    Heart failure with mildly reduced ejection fraction (HFmrEF)  [I50.22]  Yes    Presence of Watchman left atrial appendage closure device [Z95.818]  Yes    Coronary artery disease involving native coronary artery of native heart without angina pectoris [I25.10]  Yes    Stage 3 chronic kidney disease [N18.30]  Yes    Permanent atrial fibrillation [I48.21]  Yes    Type 2 diabetes mellitus with stage 3 chronic kidney disease, without long-term current use of insulin [E11.22, N18.30]  Yes    Hyperlipidemia LDL goal <100 [E78.5]  Yes    Essential hypertension [I10]  Yes      Resolved Hospital Problems   No resolved problems to display.        Brief Hospital Course to date:  Darien Camarena is a 88 y.o. male with a history of afib, CKD, CAD, CHF, COPD, GERD, DM, STEVEN on CPAP, lung cancer, HTN, HLD, was presented to the hospital with jaundice.  CT abdomen with distal common bile duct noncalcified calculi     Obstructive jaundice  Choledocholithiasis with cholangitis  Elevated LFT's  Alkaline phos 423, ast 74, alt 65, bili 8.1 on admission.  CT abdomen pelvis shows debris or noncalcified calculi within the distal common bile duct.    Underwent ERCP with multiple stone extraction, stent placement.  Pus noted.  Needs to follow-up with GI in 2 weeks after discharge  Continue Rocephin and Flagyl.  Can transition to p.o. antibiotics upon discharge  No blood cultures drawn on admission  General Surgery consulted for lap dg but currently no plans for surgical intervention.  Needs to follow with general surgery in 1 month    Chronic urine retention   Gram-negative bacilli in urine  Patient self cath 4 times a day  Does not have any urinary symptoms.  No leukocytosis or any other signs of UTI.  Currently on Rocephin for his cholangitis    CAD  A-fib with RVR status post Watchman device  S/P Cardizem drip for rate control.   Resume PO metoprolol 100 mg BID  Status post Watchman device.  Not on anticoagulation  Cardiology following and recommended to start aspirin 81 mg daily     Type 2  diabetes   A1c 5.9%  SSI     COPD  As needed DuoNebs     CKD stage III  Creatinine stable around baseline of 1.1     Dementia  Depression  CT head shows no acute intracranial findings   Family stated that patient is at baseline     Debility  PT and OT consulted    Expected Discharge Location and Transportation: needs PT eval  Expected Discharge   Expected Discharge Date: 9/9/2024; Expected Discharge Time:      VTE Prophylaxis:  Mechanical VTE prophylaxis orders are present.         AM-PAC 6 Clicks Score (PT): 19 (09/06/24 0800)    CODE STATUS:   Code Status and Medical Interventions: CPR (Attempt to Resuscitate); Full Support   Ordered at: 09/05/24 0330     Level Of Support Discussed With:    Patient     Code Status (Patient has no pulse and is not breathing):    CPR (Attempt to Resuscitate)     Medical Interventions (Patient has pulse or is breathing):    Full Support       Clarence Samaniego MD  09/07/24

## 2024-09-07 NOTE — PROGRESS NOTES
"      Cardiac Electrophysiology Inpatient Follow Up Note         [facility]    Progress Note    INITIAL REASON FOR CONSULT: Atrial fibrillation    CHIEF COMPLAINT:  Chief Complaint   Patient presents with    Dizziness     Atrial fibrillation    Subjective   Mr. Darien Camarena denies vomiting, abdominal pain, fussiness, diarrhea, cough, difficulty breathing, and feels well and wants to go home.        Objective   VITAL SIGNS  BP (!) 128/105   Pulse 111   Temp 97.4 °F (36.3 °C) (Oral)   Resp 16   Ht 190.5 cm (75\")   Wt 104 kg (229 lb 11.5 oz)   SpO2 (!) 88%   BMI 28.71 kg/m²   [unfilled]  Pulse Ox: SpO2  Av.6 %  Min: 88 %  Max: 95 %  Supplemental O2:       Admit Weight  Weight: 105 kg (231 lb 7.7 oz)  Last 3 Weights  [unfilled]  Body mass index is 28.71 kg/m².    INTAKE/OUTPUT  I/O last 3 completed shifts:  In: 360 [P.O.:360]  Out: 1900 [Urine:1900]    Intake/Output Summary (Last 24 hours) at 2024 1041  Last data filed at 2024 2217  Gross per 24 hour   Intake --   Output 1300 ml   Net -1300 ml       PHYSICAL EXAM  General appearance: awake, alert, oriented, moves all extremities  Lungs: no rhonchi, no wheezes, no rales  Heart: Irregularly irregular  Abdomen: positive bowel sounds, no bruits, no masses  Extremities: warm and dry, no cyanosis, no clubbing    LABS  Results from last 7 days   Lab Units 24  04324  0910 09/05/24  0713   WBC 10*3/mm3 8.73 4.82 5.84   HEMOGLOBIN g/dL 12.0* 12.9* 13.3   HEMATOCRIT % 37.4* 38.8 40.6   MCV fL 91.7 89.2 90.2   PLATELETS 10*3/mm3 176 164 146         Results from last 7 days   Lab Units 24  0430 24  0910 24  0713 24  1821   POTASSIUM mmol/L 4.0 3.8 3.2* 3.6   CHLORIDE mmol/L 100 100 101 100   CO2 mmol/L 25.0 24.0 27.0 27.7   BUN mg/dL 22 23 28* 32*   CREATININE mg/dL 0.73* 0.68* 0.84 1.09   GLUCOSE mg/dL 146* 125* 82 112*   CALCIUM mg/dL 9.0 8.9 8.6 9.0   MAGNESIUM mg/dL  --  1.6  " "--  1.6     Results from last 7 days   Lab Units 09/05/24  0712 09/04/24  1821   PROTIME Seconds 17.1* 17.2*   INR  1.38* 1.34*         Results from last 7 days   Lab Units 09/06/24  0910 09/04/24  1821   MAGNESIUM mg/dL 1.6 1.6                 No results found for: \"CHOL\", \"TRIG\", \"HDL\"    CURRENT MEDICATIONS  ipratropium, 0.5 mg, Nebulization, 4x Daily - RT   And  arformoterol, 15 mcg, Nebulization, BID - RT  aspirin, 81 mg, Oral, Daily  cefTRIAXone, 2,000 mg, Intravenous, Q24H  insulin regular, 2-7 Units, Subcutaneous, Q6H  melatonin, 10 mg, Oral, Nightly  metoprolol tartrate, 50 mg, Oral, Q12H  metroNIDAZOLE, 500 mg, Intravenous, Q8H  pantoprazole, 40 mg, Oral, BID AC  sodium chloride, 10 mL, Intravenous, Q12H  ursodiol, 300 mg, Oral, BID      CONTINUOUS INFUSIONS  dilTIAZem, 5-15 mg/hr, Last Rate: 10 mg/hr (09/07/24 0806)               Diagnosis Plan   1. Atrial fibrillation with RVR  Rate controlled.  No indication for anticoagulation due to watchman.    Okay to go home from my perspective.    Cardiology will sign off.    Follow-up already scheduled for December 5, 2024.     Body mass index is 28.71 kg/m².    I spent 34 minutes in consultation with this patient which included more than 65% of this time in direct face-to-face counseling, physical examination and discussion of my assessment and findings and this shared decision making with the patient.  The remainder of the time not spent face-to-face was performing one, some or all of the following actions: preparing to see the patient (e.g. reviewing tests, prior clinicians' notes, etc), ordering medications, tests or procedures, coordination of care, discussion of the plan with other healthcare providers, documenting clinical information in epic as well as independently interpreting results and communication of these results to the patient family and/or caregiver(s).  Please note that this explicitly excludes time spent on other separate billable services " such as performing procedures or test interpretation, when applicable.        Gutierrez Pak, DO, FACC, [facility]  Cardiac Electrophysiologist  [facility] / [facility]

## 2024-09-08 ENCOUNTER — READMISSION MANAGEMENT (OUTPATIENT)
Dept: CALL CENTER | Facility: HOSPITAL | Age: 89
End: 2024-09-08
Payer: MEDICARE

## 2024-09-08 VITALS
DIASTOLIC BLOOD PRESSURE: 91 MMHG | RESPIRATION RATE: 18 BRPM | WEIGHT: 234.35 LBS | SYSTOLIC BLOOD PRESSURE: 131 MMHG | TEMPERATURE: 98.2 F | HEIGHT: 75 IN | OXYGEN SATURATION: 94 % | HEART RATE: 114 BPM | BODY MASS INDEX: 29.14 KG/M2

## 2024-09-08 LAB
BACTERIA SPEC AEROBE CULT: ABNORMAL
GLUCOSE BLDC GLUCOMTR-MCNC: 113 MG/DL (ref 70–130)
GLUCOSE BLDC GLUCOMTR-MCNC: 130 MG/DL (ref 70–130)
GLUCOSE BLDC GLUCOMTR-MCNC: 145 MG/DL (ref 70–130)

## 2024-09-08 PROCEDURE — 97530 THERAPEUTIC ACTIVITIES: CPT

## 2024-09-08 PROCEDURE — 97161 PT EVAL LOW COMPLEX 20 MIN: CPT

## 2024-09-08 PROCEDURE — 94664 DEMO&/EVAL PT USE INHALER: CPT

## 2024-09-08 PROCEDURE — 25010000002 METRONIDAZOLE 500 MG/100ML SOLUTION: Performed by: INTERNAL MEDICINE

## 2024-09-08 PROCEDURE — 82948 REAGENT STRIP/BLOOD GLUCOSE: CPT

## 2024-09-08 PROCEDURE — 94799 UNLISTED PULMONARY SVC/PX: CPT

## 2024-09-08 PROCEDURE — 99238 HOSP IP/OBS DSCHRG MGMT 30/<: CPT | Performed by: INTERNAL MEDICINE

## 2024-09-08 RX ORDER — LEVOFLOXACIN 750 MG/1
750 TABLET, FILM COATED ORAL DAILY
Qty: 7 TABLET | Refills: 0 | Status: SHIPPED | OUTPATIENT
Start: 2024-09-08 | End: 2024-09-15

## 2024-09-08 RX ORDER — METRONIDAZOLE 500 MG/1
500 TABLET ORAL 3 TIMES DAILY
Qty: 15 TABLET | Refills: 0 | Status: SHIPPED | OUTPATIENT
Start: 2024-09-08 | End: 2024-09-13

## 2024-09-08 RX ADMIN — URSODIOL 300 MG: 300 CAPSULE ORAL at 09:16

## 2024-09-08 RX ADMIN — ARFORMOTEROL TARTRATE 15 MCG: 15 SOLUTION RESPIRATORY (INHALATION) at 07:51

## 2024-09-08 RX ADMIN — METRONIDAZOLE 500 MG: 5 INJECTION, SOLUTION INTRAVENOUS at 03:57

## 2024-09-08 RX ADMIN — IPRATROPIUM BROMIDE 0.5 MG: 0.5 SOLUTION RESPIRATORY (INHALATION) at 07:51

## 2024-09-08 RX ADMIN — ASPIRIN 81 MG 81 MG: 81 TABLET ORAL at 09:20

## 2024-09-08 RX ADMIN — METOPROLOL TARTRATE 100 MG: 100 TABLET, FILM COATED ORAL at 09:16

## 2024-09-08 RX ADMIN — METRONIDAZOLE 500 MG: 5 INJECTION, SOLUTION INTRAVENOUS at 11:35

## 2024-09-08 RX ADMIN — PANTOPRAZOLE SODIUM 40 MG: 40 TABLET, DELAYED RELEASE ORAL at 06:34

## 2024-09-08 RX ADMIN — IPRATROPIUM BROMIDE 0.5 MG: 0.5 SOLUTION RESPIRATORY (INHALATION) at 12:48

## 2024-09-08 RX ADMIN — Medication 10 ML: at 09:22

## 2024-09-08 NOTE — NURSING NOTE
Pt is alert and oriented. RA/2 L via NC at night while asleep. A. Fib. With rates 100s-120s. Cardiazem D/C yesterday, and metoprolol was increased per cardiology. Pt still receiving IV antibiotics. Bed is in lowest position, bed alarm active and audible, call light within reach, and no other requests at this time.

## 2024-09-08 NOTE — OUTREACH NOTE
Prep Survey      Flowsheet Row Responses   [facility] patient discharged from? [place]   Is LACE score < 7 ? No   Eligibility [facility]   Date of Admission 09/04/24   Date of Discharge 09/08/24   Discharge Disposition Home or Self Care   Discharge diagnosis Choledocholithiasis   Does the patient have one of the following disease processes/diagnoses(primary or secondary)? Other   Does the patient have Home health ordered? No   Is there a DME ordered? Yes   What DME was ordered? Misc. DME   Comments regarding appointments Ambulatory Referral to [facility] [place], [facility] [place] [facility],   Ambulatory Referral to Gastroenterology, Ambulatory Referral to General Surgery   Medication alerts for this patient Janet Licea   Prep survey completed? Yes            Elsie GALAVIZ - Registered Nurse

## 2024-09-08 NOTE — THERAPY EVALUATION
Patient Name: Darien Camarena  : 1935    MRN: [id number]                              Today's Date: 2024       Admit Date: 2024    Visit Dx:     ICD-10-CM ICD-9-CM   1. Choledocholithiasis  K80.50 574.50   2. Atrial fibrillation with RVR  I48.91 427.31   3. Dementia, unspecified dementia severity, unspecified dementia type, unspecified whether behavioral, psychotic, or mood disturbance or anxiety  F03.90 294.20   4. Elevated troponin  R79.89 790.6   5. Cystitis  N30.90 595.9   6. Type 2 diabetes mellitus with stage 3 chronic kidney disease, without long-term current use of insulin, unspecified whether stage 3a or 3b CKD  E11.22 250.40    N18.30 585.3   7. Essential hypertension  I10 401.9   8. Permanent atrial fibrillation  I48.21 427.31   9. Stage 3 chronic kidney disease, unspecified whether stage 3a or 3b CKD  N18.30 585.3   10. Coronary artery disease involving native coronary artery of native heart without angina pectoris  I25.10 414.01   11. Presence of Watchman left atrial appendage closure device  Z95.818 V45.09   12. Heart failure with mildly reduced ejection fraction (HFmrEF)  I50.22 428.22   13. Dysphagia  R13.10 787.20   14. Esophagitis  K20.90 530.10     Patient Active Problem List   Diagnosis    Type 2 diabetes mellitus with stage 3 chronic kidney disease, without long-term current use of insulin    Gastroesophageal reflux disease with esophagitis    Hyperlipidemia LDL goal <100    Essential hypertension    Nephrolithiasis    Obstructive sleep apnea syndrome    Permanent atrial fibrillation    Stage 3 chronic kidney disease    Coronary artery disease involving native coronary artery of native heart without angina pectoris    Malignant neoplasm of lower lobe of left lung    H/O: GI bleed    Presence of Watchman left atrial appendage closure device    Heart failure with mildly reduced ejection fraction (HFmrEF)    Obesity    Choledocholithiasis    Severe malnutrition     Past Medical  History:   Diagnosis Date    Arrhythmia     Atrial fibrillation     Bilateral leg cramps     possible new medication related side effects    Chronic kidney disease     Clotting disorder     pt doesnt know about this    COPD (chronic obstructive pulmonary disease)     Coronary artery disease     Diabetes mellitus     doesnt check sugar    E. coli sepsis 06/23/2022    Enlarged prostate without lower urinary tract symptoms (luts) 06/20/2016    Full dentures     GERD (gastroesophageal reflux disease)     Gout     Hearing aid worn     bilat prn    History of colonoscopy 09/12/2012    History of radiation therapy 02/24/2023    SBRT LLL lung    [place] (hard of hearing)     hearing aids prn    Hyperlipidemia     Hypertension     Kidney stone     surgery x1    Lung cancer     STEVEN on CPAP     compliant with machine    PAF (paroxysmal atrial fibrillation)     Prostatism     Sleep apnea     CPAP HS    Urinary incontinence     Urinary tract infection     Wears glasses     readers     Past Surgical History:   Procedure Laterality Date    ATRIAL APPENDAGE EXCLUSION LEFT WITH TRANSESOPHAGEAL ECHOCARDIOGRAM N/A 11/28/2023    Procedure: Atrial Appendage Occlusion;  Surgeon: Anthony Mcdaniel MD;  Location:  [facility];  Service: Cardiovascular;  Laterality: N/A;    CARDIAC CATHETERIZATION Left 09/29/2022    Procedure: Left Heart Cath;  Surgeon: Titus Oliveros IV, MD;  Location:  [facility] CATH [facility]ASIVE LOC[facility];  Service: Cardiovascular;  Laterality: Left;    CARDIOVERSION      CATARACT EXTRACTION Bilateral     COLONOSCOPY      CYSTOSCOPY      ENDOSCOPY      possible    KIDNEY STONE SURGERY      x1      General Information       Row Name 09/08/24 1027          Physical Therapy Time and Intention    Document Type evaluation  -ML     Mode of Treatment physical therapy  -ML       Row Name 09/08/24 1027          General Information    Patient Profile Reviewed yes  -ML     Prior Level of Function independent:;all household  mobility;community mobility;gait;transfer;bed mobility;ADL's;home management;driving  patient reports ambulating with a SPC at baseline, owns rollator, patient's daughter does the grocery shopping, his granddaughter cleans the house  -ML     Existing Precautions/Restrictions fall  -ML     Barriers to Rehab none identified  -ML       Row Name 09/08/24 1027          Living Environment    People in Home alone  -       Row Name 09/08/24 1027          Home Main Entrance    Number of Stairs, Main Entrance one  -ML     Stair Railings, Main Entrance none  -ML       Row Name 09/08/24 1027          Stairs Within Home, Primary    Number of Stairs, Within Home, Primary none  -ML       Row Name 09/08/24 1027          Cognition    Orientation Status (Cognition) oriented x 4  -ML       Row Name 09/08/24 1027          Safety Issues, Functional Mobility    Safety Issues Affecting Function (Mobility) safety precaution awareness  -ML     Impairments Affecting Function (Mobility) balance;endurance/activity tolerance;strength;shortness of breath  -ML               User Key  (r) = Recorded By, (t) = Taken By, (c) = Cosigned By      Initials Name Provider Type     Vanessa Chiu Physical Therapist                   Mobility       Row Name 09/08/24 1028          Bed Mobility    Comment, (Bed Mobility) patient found and left seated in bedside chair  -       Row Name 09/08/24 1028          Sit-Stand Transfer    Sit-Stand [place] (Transfers) standby assist  -     Assistive Device (Sit-Stand Transfers) walker, front-wheeled  -ML       Row Name 09/08/24 1028          Gait/Stairs (Locomotion)    [place] Level (Gait) verbal cues;contact guard  -     Assistive Device (Gait) walker, front-wheeled  -     Distance in Feet (Gait) 160  -ML     Bilateral Gait Deviations forward flexed posture  -ML     Comment, (Gait/Stairs) patient ambulates with step through gait pattern, 1 standing rest break required due to dyspnea, no loss of  balance  -ML               User Key  (r) = Recorded By, (t) = Taken By, (c) = Cosigned By      Initials Name Provider Type    ML Vanessa Chiu Physical Therapist                   Obj/Interventions       Row Name 09/08/24 1031          Range of Motion Comprehensive    General Range of Motion bilateral lower extremity ROM WFL  -ML       Row Name 09/08/24 1031          Strength Comprehensive (MMT)    General Manual Muscle Testing (MMT) Assessment no strength deficits identified  -ML       Row Name 09/08/24 1031          Balance    Balance Assessment sitting static balance;sitting dynamic balance;sit to stand dynamic balance;standing static balance;standing dynamic balance  -ML     Static Sitting Balance independent  -ML     Dynamic Sitting Balance standby assist  -ML     Position, Sitting Balance unsupported;sitting in chair  -ML     Sit to Stand Dynamic Balance standby assist  -ML     Static Standing Balance standby assist  -ML     Dynamic Standing Balance contact guard  -ML     Position/Device Used, Standing Balance supported;walker, front-wheeled  -ML     Balance Interventions sitting;standing;sit to stand;supported;occupation based/functional task  -ML               User Key  (r) = Recorded By, (t) = Taken By, (c) = Cosigned By      Initials Name Provider Type    ML Vanessa Chiu Physical Therapist                   Goals/Plan       Row Name 09/08/24 1035          Bed Mobility Goal 1 (PT)    Activity/Assistive Device (Bed Mobility Goal 1, PT) sit to supine;supine to sit  -ML     [place] Level/Cues Needed (Bed Mobility Goal 1, PT) independent  -ML     Time Frame (Bed Mobility Goal 1, PT) short term goal (STG);5 days  -ML       Row Name 09/08/24 1035          Transfer Goal 1 (PT)    Activity/Assistive Device (Transfer Goal 1, PT) sit-to-stand/stand-to-sit;bed-to-chair/chair-to-bed;other (see comments)  LRAD  -ML     [place] Level/Cues Needed (Transfer Goal 1, PT) modified independence  -ML     Time Frame  (Transfer Goal 1, PT) long term goal (LTG);10 days  -ML       Row Name 09/08/24 1035          Gait Training Goal 1 (PT)    Activity/Assistive Device (Gait Training Goal 1, PT) gait (walking locomotion);assistive device use;decrease fall risk;improve balance and speed;increase endurance/gait distance;other (see comments)  LRAD  -ML     [place] Level (Gait Training Goal 1, PT) modified independence  -ML     Distance (Gait Training Goal 1, PT) 300  -ML     Time Frame (Gait Training Goal 1, PT) long term goal (LTG);10 days  -ML       Row Name 09/08/24 1035          Therapy Assessment/Plan (PT)    Planned Therapy Interventions (PT) balance training;bed mobility training;gait training;home exercise program;neuromuscular re-education;patient/family education;postural re-education;ROM (range of motion);strengthening;stretching;transfer training  -ML               User Key  (r) = Recorded By, (t) = Taken By, (c) = Cosigned By      Initials Name Provider Type    ML Vanessa Chiu Physical Therapist                   Clinical Impression       Row Name 09/08/24 1032          Pain    Pretreatment Pain Rating 0/10 - no pain  -ML     Posttreatment Pain Rating 0/10 - no pain  -ML       Row Name 09/08/24 1032          Plan of Care Review    Plan of Care Reviewed With patient  -     Outcome Evaluation Physical therapy evaluation complete. The patient required CGA to ambulate with RW, 1 standing rest break required due to dyspnea. Patient presents below baseline for mobility and would continue to benefit from skilled PT to address activity tolerance and balance deficits.  -ML       Row Name 09/08/24 1032          Therapy Assessment/Plan (PT)    Patient/Family Therapy Goals Statement (PT) return to PLOF  -ML     Rehab Potential (PT) good, to achieve stated therapy goals  -ML     Criteria for Skilled Interventions Met (PT) yes;meets criteria;skilled treatment is necessary  -ML     Therapy Frequency (PT) daily  -ML     Predicted  Duration of Therapy Intervention (PT) 5 days  -ML       Row Name 09/08/24 1032          Vital Signs    Pre SpO2 (%) 91  -ML     O2 Delivery Pre Treatment room air  -ML     Post SpO2 (%) 94  -ML     O2 Delivery Post Treatment room air  -ML       Row Name 09/08/24 1032          Positioning and Restraints    Pre-Treatment Position sitting in chair/recliner  -ML     Post Treatment Position chair  -ML     In Chair notified nsg;call light within reach;encouraged to call for assist;sitting;waffle cushion  RN deferred chair alarm  -ML               User Key  (r) = Recorded By, (t) = Taken By, (c) = Cosigned By      Initials Name Provider Type    Vanessa Wagner Physical Therapist                   Outcome Measures       Row Name 09/08/24 1035          How much help from another person do you currently need...    Turning from your back to your side while in flat bed without using bedrails? 4  -ML     Moving from lying on back to sitting on the side of a flat bed without bedrails? 4  -ML     Moving to and from a bed to a chair (including a wheelchair)? 3  -ML     Standing up from a chair using your arms (e.g., wheelchair, bedside chair)? 4  -ML     Climbing 3-5 steps with a railing? 3  -ML     To walk in hospital room? 3  -ML     AM-PAC 6 Clicks Score (PT) 21  -ML     Highest Level of Mobility Goal 6 --> Walk 10 steps or more  -ML       Row Name 09/08/24 1035          Functional Assessment    Outcome Measure Options AM-PAC 6 Clicks Basic Mobility (PT)  -ML               User Key  (r) = Recorded By, (t) = Taken By, (c) = Cosigned By      Initials Name Provider Type    Vanessa Wagner Physical Therapist                                 Physical Therapy Education       Title: PT OT SLP Therapies (In Progress)       Topic: Physical Therapy (In Progress)       Point: Mobility training (Done)       Learning Progress Summary             Patient Acceptance, E, VU,NR by ONOFRE at 9/8/2024 1036                         Point: Home exercise  program (Not Started)       Learner Progress:  Not documented in this visit.              Point: Body mechanics (Not Started)       Learner Progress:  Not documented in this visit.              Point: Precautions (Done)       Learning Progress Summary             Patient Acceptance, E, VU,NR by  at 9/8/2024 1036                                         User Key       Initials Effective Dates Name Provider Type Discipline     04/22/21 -  Vanessa Chiu Physical Therapist PT                  PT Recommendation and Plan  Planned Therapy Interventions (PT): balance training, bed mobility training, gait training, home exercise program, neuromuscular re-education, patient/family education, postural re-education, ROM (range of motion), strengthening, stretching, transfer training  Plan of Care Reviewed With: patient  Outcome Evaluation: Physical therapy evaluation complete. The patient required CGA to ambulate with RW, 1 standing rest break required due to dyspnea. Patient presents below baseline for mobility and would continue to benefit from skilled PT to address activity tolerance and balance deficits.     Time Calculation:   PT Evaluation Complexity  History, PT Evaluation Complexity: 1-2 personal factors and/or comorbidities  Examination of Body Systems (PT Eval Complexity): 1-2 elements  Clinical Presentation (PT Evaluation Complexity): evolving  Clinical Decision Making (PT Evaluation Complexity): low complexity  Overall Complexity (PT Evaluation Complexity): low complexity     PT Charges       Row Name 09/08/24 1036             Time Calculation    Start Time 0958  -ML      PT Received On 09/08/24  -ML      PT Goal Re-Cert Due Date 09/18/24  -ML         Timed Charges    60227 - PT Therapeutic Activity Minutes 8  -ML         Untimed Charges    PT Eval/Re-eval Minutes 31  -ML         Total Minutes    Timed Charges Total Minutes 8  -ML      Untimed Charges Total Minutes 31  -ML       Total Minutes 39  -ML                 User Key  (r) = Recorded By, (t) = Taken By, (c) = Cosigned By      Initials Name Provider Type    Vanessa Wagner Physical Therapist                  Therapy Charges for Today       Code Description Service Date Service Provider Modifiers Qty    81927850583 HC PT THERAPEUTIC ACT EA 15 MIN 9/8/2024 Vanessa Chiu GP 1    05699119478 HC PT EVAL LOW COMPLEXITY 3 9/8/2024 Vanessa Chiu GP 1            PT G-Codes  Outcome Measure Options: AM-PAC 6 Clicks Basic Mobility (PT)  AM-PAC 6 Clicks Score (PT): 21  PT Discharge Summary  Anticipated Discharge Disposition (PT): home with assist, home with outpatient therapy services    Vanessa Chiu  9/8/2024

## 2024-09-08 NOTE — PLAN OF CARE
Goal Outcome Evaluation:  Plan of Care Reviewed With: patient           Outcome Evaluation: Physical therapy evaluation complete. The patient required CGA to ambulate with RW, 1 standing rest break required due to dyspnea. Patient presents below baseline for mobility and would continue to benefit from skilled PT to address activity tolerance and balance deficits.      Anticipated Discharge Disposition (PT): home with assist, home with outpatient therapy services

## 2024-09-08 NOTE — DISCHARGE SUMMARY
[facility] Medicine Services  DISCHARGE SUMMARY    Patient Name: Darien Camarena  : 1935  MRN: [id number]    Date of Admission: 2024  5:45 PM  Date of Discharge:  2024  Primary Care Physician: Pito Way MD    Consults       Date and Time Order Name Status Description    2024  5:53 AM Inpatient Gastroenterology Consult Completed     2024  3:30 AM Inpatient Cardiology Consult Completed     2024  3:30 AM Inpatient General Surgery Consult Completed             Hospital Course     Presenting Problem:     Active Hospital Problems    Diagnosis  POA    **Choledocholithiasis [K80.50]  Yes    Severe malnutrition [E43]  Yes    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]  Yes    Presence of Watchman left atrial appendage closure device [Z95.818]  Yes    Coronary artery disease involving native coronary artery of native heart without angina pectoris [I25.10]  Yes    Stage 3 chronic kidney disease [N18.30]  Yes    Permanent atrial fibrillation [I48.21]  Yes    Type 2 diabetes mellitus with stage 3 chronic kidney disease, without long-term current use of insulin [E11.22, N18.30]  Yes    Hyperlipidemia LDL goal <100 [E78.5]  Yes    Essential hypertension [I10]  Yes      Resolved Hospital Problems   No resolved problems to display.          Hospital Course:  Darien Camarena is a 88 y.o. male with a history of afib, CKD, CAD, CHF, COPD, GERD, DM, STEVEN on CPAP, lung cancer, HTN, HLD, was presented to the hospital with jaundice.  CT abdomen with distal common bile duct noncalcified calculi.  Underwent ERCP with multiple stone extraction and stent placement.  Pus was drained which was concerning for cholangitis without overt signs of sepsis.  Started on IV Rocephin and Flagyl during hospitalization and transition to p.o. Augmentin upon discharge.  Cleared for discharge by cardiology, general surgery and GI services.  Appropriate referral  given to the patient upon discharge     Obstructive jaundice  Choledocholithiasis with cholangitis  Elevated LFT's  Alkaline phos 423, ast 74, alt 65, bili 8.1 on admission.  CT abdomen pelvis shows debris or noncalcified calculi within the distal common bile duct.    Underwent ERCP with multiple stone extraction, stent placement.  Pus noted.  Needs to follow-up with GI in 2 weeks after discharge.  Referral placed in epic  Status post IV Rocephin and Flagyl while hospitalized.  Transition to p.o. Augmentin upon discharge  General Surgery consulted for lap dg but currently no plans for surgical intervention.  Needs to follow with general surgery in 1 month.  Referral placed in epic     Chronic urine retention   Patient self cath 4 times a day  Urine culture grew Enterobacter cloacae, likely colonization.  The patient does not have any urinary symptoms or leukocytosis or any other signs of UTI.       CAD  A-fib with RVR status post Watchman device  S/P Cardizem drip for rate control.   Resume PO metoprolol 100 mg BID  Status post Watchman device.  Not on anticoagulation  On day of discharge, heart rate was 100-110.  Discussed with Dr. Pak/cardiology on-call and he cleared the patient for discharge stating that there is no need to add any other rate control medications     Type 2 diabetes   A1c 5.9%  SSI     COPD  As needed DuoNebs     CKD stage III  Creatinine stable around baseline of 1.1     Dementia  Depression  CT head shows no acute intracranial findings   Family stated that patient is at baseline     Debility  PT and OT consulted       Discharge Follow Up Recommendations for outpatient labs/diagnostics:  PCP 1 week  GI referral in 2 weeks (referral placed in [facility])  General surgery referral in 4 weeks (referral placed in epic)    Day of Discharge     HPI:   Patient seen and examined this morning.  Comfortable in bed.  No acute complaints today.  PT evaluated him and he was cleared for discharge home.  Does  not have any chest pain or shortness of breath.  In A-fib with heart rate in the 110-120 and cleared for discharge by cardiology team.  Daughter to come and pick him up    Vital Signs:   Temp:  [97 °F (36.1 °C)-98.4 °F (36.9 °C)] 98.2 °F (36.8 °C)  Heart Rate:  [] 114  Resp:  [16-18] 18  BP: (127-143)/() 131/91  Flow (L/min):  [2] 2      Physical Exam:  General: Comfortable, not in distress, conversant and cooperative  Head: Atraumatic and normocephalic  Eyes: No Icterus. No pallor  Ears:  Ears appear intact with no abnormalities noted  Throat: No oral lesions, no thrush  Neck: Supple, trachea midline  Lungs: Clear to auscultation bilaterally, equal air entry, no wheezing or crackles  Heart:  Normal S1 and S2, no murmur, no gallop, No JVD, no lower extremity swelling  Abdomen:  Soft, no tenderness, no organomegaly, normal bowel sounds, no organomegaly  Extremities: pulses equal bilaterally  Skin: No bleeding, bruising or rash, normal skin turgor and elasticity  Neurologic: Cranial nerves appear intact with no evidence of facial asymmetry, normal motor and sensory functions in all 4 extremities  Psych: Alert and oriented x 3, normal mood           Pertinent  and/or Most Recent Results     LAB RESULTS:      Lab 09/07/24  0430 09/06/24  0910 09/05/24  0713 09/05/24  0712 09/04/24  1821   WBC 8.73 4.82 5.84  --  7.14   HEMOGLOBIN 12.0* 12.9* 13.3  --  13.3   HEMATOCRIT 37.4* 38.8 40.6  --  41.6   PLATELETS 176 164 146  --  182   NEUTROS ABS 7.47* 4.20 4.31  --  5.81   IMMATURE GRANS (ABS) 0.04 0.01 0.03  --  0.01   LYMPHS ABS 0.73 0.45* 0.77  --  0.73   MONOS ABS 0.48 0.15 0.44  --  0.51   EOS ABS 0.00 0.00 0.27  --  0.07   MCV 91.7 89.2 90.2  --  89.5   PROTIME  --   --   --  17.1* 17.2*   APTT  --   --   --   --  34.0*         Lab 09/07/24  0430 09/06/24  0910 09/05/24  0713 09/04/24  1821   SODIUM 136 138 139 137   POTASSIUM 4.0 3.8 3.2* 3.6   CHLORIDE 100 100 101 100   CO2 25.0 24.0 27.0 27.7   ANION GAP  11.0 14.0 11.0 9.3   BUN 22 23 28* 32*   CREATININE 0.73* 0.68* 0.84 1.09   EGFR 88.1 90.0 84.4 65.7   GLUCOSE 146* 125* 82 112*   CALCIUM 9.0 8.9 8.6 9.0   MAGNESIUM  --  1.6  --  1.6   PHOSPHORUS  --  3.0  --   --          Lab 09/07/24  0430 09/06/24  0910 09/04/24  1821   TOTAL PROTEIN 5.7* 6.5 6.4   ALBUMIN 2.9* 2.9* 3.1*   GLOBULIN 2.8 3.6 3.3   ALT (SGPT) 36 42* 65*   AST (SGOT) 25 29 74*   BILIRUBIN 2.7* 3.7* 8.1*   ALK PHOS 345* 389* 423*   LIPASE  --   --  18         Lab 09/05/24  0712 09/04/24 2026 09/04/24  1821   HSTROP T  --  27* 30*   PROTIME 17.1*  --  17.2*   INR 1.38*  --  1.34*                 Brief Urine Lab Results  (Last result in the past 365 days)        Color   Clarity   Blood   Leuk Est   Nitrite   Protein   CREAT   Urine HCG        09/04/24 2148 Yellow   Clear   Negative   Small (1+)   Negative   Trace                 Microbiology Results (last 10 days)       Procedure Component Value - Date/Time    Urine Culture - Urine, Urine, Clean Catch [[id number]]  (Abnormal)  (Susceptibility) Collected: 09/04/24 2148    Lab Status: Final result Specimen: Urine, Clean Catch Updated: 09/08/24 1022     Urine Culture >100,000 CFU/mL Enterobacter cloacae complex     Comment:   This organism may develop resistance during prolonged therapy with 3rd generation cephalosporins (e.g. ceftriaxone) as a result of de-repression of AmpC B-lactamase.  Ceftriaxone may be a reasonable treatment option for uncomplicated cystitis or other lower severity infections when susceptibility is demonstrated.       Narrative:      Colonization of the urinary tract without infection is common. Treatment is discouraged unless the patient is symptomatic, pregnant, or undergoing an invasive urologic procedure.    Susceptibility        Enterobacter cloacae complex      JESSICA      Cefepime Susceptible      Ceftazidime Resistant      Ceftriaxone Resistant      Ertapenem Susceptible      Gentamicin Susceptible      Levofloxacin  Susceptible      Nitrofurantoin Intermediate      Piperacillin + Tazobactam Resistant      Trimethoprim + Sulfamethoxazole Susceptible                                   FL ERCP pancreatic and biliary ducts    Result Date: 9/5/2024  FL ERCP PANCREATIC AND BILIARY DUCTS Date of Exam: 9/5/2024 2:17 PM EDT Indication: ENDOSCOPIC RETROGRADE CHOLANGIOPANCREATOGRAPHY.   Comparison: None available. Technique:  A series of radiographic digital spot films were obtained in conjunction with an endoscopic catheterization of the biliary and pancreatic ductal system, performed by the gastroenterologist. Fluoroscopic Time: 2 minutes 48 seconds Number of Images: 11 Findings: Dictation is to record 2 minutes and 48 seconds of fluoroscopy time. A total of 11 images obtained show endoscope with side cannula placement, contrast injection of the common duct, apparent balloon sweep, and subsequent plastic stent placement. Please see the procedure report for full details.     Impression: Fluoroscopy provided during ERCP and stent placement. Electronically Signed: Oliverio Younger MD  9/5/2024 4:25 PM EDT  Workstation ID: [id number]    MRI abdomen wo contrast mrcp    Result Date: 9/5/2024  MRI ABDOMEN WO CONTRAST MRCP Date of Exam: 9/5/2024 4:42 AM EDT Indication: elevated LFTS, calculi within the distal common bile duct.  Comparison: CT abdomen pelvis dated September 4, 2024 Technique:  Routine multiplanar/multisequence images of the abdomen were obtained with MRCP sequences without contrast administration. Findings: Motion artifact degrades the image quality of this examination. There are small bilateral pleural effusions with areas of basilar atelectasis better described and seen on CT scan. Within the limitations of motion artifact, the pancreas is normal. The liver parenchyma is unremarkable. There are cysts of the left kidney. The right kidney is normal. The gallbladder is mildly distended but there is no evidence of wall thickening or  pericholecystic fluid. The common bile duct is nondilated. However, there are several small filling defects in the distal common bile duct consistent with nonobstructing choledocholithiasis. The largest stone is about 5 mm. The maximum common bile duct diameter is about 9 mm.     Impression: Choledocholithiasis with several small stones in the distal common bile duct. The largest stone is about 5 mm. The common bile duct is not dilated. Electronically Signed: Timothy Willoughby MD  9/5/2024 5:19 AM EDT  Workstation ID: [id number]    CT Abdomen Pelvis With Contrast    Result Date: 9/4/2024  CT ANGIOGRAM CHEST PULMONARY EMBOLISM, CT ABDOMEN PELVIS W CONTRAST Date of Exam: 9/4/2024 7:42 PM EDT Indication: soa. Comparison: 4/15/2024 Technique: Axial CT images were obtained of the chest and CT of the abdomen and pelvis after the uneventful intravenous administration of 86 cc Isovue-370 IV contrast utilizing pulmonary embolism protocol.  Reconstructed coronal and sagittal images were also obtained. Automated exposure control and iterative construction methods were used. Findings: The thyroid gland is normal. The subglottic airway is clear. No aortic dissection. 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 No pulmonary embolus. Small right and trace left pleural effusions. No pericardial effusion. Liver: 0.8 cm right hepatic cyst. Spleen:No masses. No perisplenic hematoma. Pancreas:No pancreatic masses. No evidence of pancreatitis. Gallbladder and common bile duct: Dilated gallbladder with extrahepatic biliary ductal dilatation. There appears to be material possibly noncalcified calculus within the still common bile duct resulting in partial obstruction. Adrenal glands:No adrenal masses Kidneys and ureters: Exophytic 2.3 cm left renal cyst. Exophytic 2 cm left renal cyst. Prominent left extrarenal pelvis and hydroureter on a chronic basis extending to the urinary bladder. Urinary bladder:No urinary bladder wall thickening.  No bladder masses. Small bowel:Normal caliber small bowel. Large bowel:No diverticulosis or diverticulitis. No large bowel masses are appreciated Appendix: Normal appendix. GENITOURINARY: Normal prostate Ascites or pneumoperitoneum:None. Adenopathy:None present Osseous structures: Degenerative changes of the hips. The proximal femurs are intact. The pubic bones are intact. The sacroiliac joints are normal. Multilevel degenerative changes of the lumbar spine. Other findings: None     1. There appears to be debris or noncalcified calculi within the distal common bile duct. No evidence of cholecystitis at this time. 2. No pulmonary embolus. 3. Small right and trace left pleural effusions. 4. 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 Cardiology consult recommended. Electronically Signed: Yoni Rios MD  9/4/2024 8:06 PM EDT  Workstation ID: [id number]    CT Angiogram Chest Pulmonary Embolism    Result Date: 9/4/2024  CT ANGIOGRAM CHEST PULMONARY EMBOLISM, CT ABDOMEN PELVIS W CONTRAST Date of Exam: 9/4/2024 7:42 PM EDT Indication: soa. Comparison: 4/15/2024 Technique: Axial CT images were obtained of the chest and CT of the abdomen and pelvis after the uneventful intravenous administration of 86 cc Isovue-370 IV contrast utilizing pulmonary embolism protocol.  Reconstructed coronal and sagittal images were also obtained. Automated exposure control and iterative construction methods were used. Findings: The thyroid gland is normal. The subglottic airway is clear. No aortic dissection. 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 No pulmonary embolus. Small right and trace left pleural effusions. No pericardial effusion. Liver: 0.8 cm right hepatic cyst. Spleen:No masses. No perisplenic hematoma. Pancreas:No pancreatic masses. No evidence of pancreatitis. Gallbladder and common bile duct: Dilated gallbladder with extrahepatic biliary ductal dilatation. There appears to be material possibly noncalcified  calculus within the still common bile duct resulting in partial obstruction. Adrenal glands:No adrenal masses Kidneys and ureters: Exophytic 2.3 cm left renal cyst. Exophytic 2 cm left renal cyst. Prominent left extrarenal pelvis and hydroureter on a chronic basis extending to the urinary bladder. Urinary bladder:No urinary bladder wall thickening. No bladder masses. Small bowel:Normal caliber small bowel. Large bowel:No diverticulosis or diverticulitis. No large bowel masses are appreciated Appendix: Normal appendix. GENITOURINARY: Normal prostate Ascites or pneumoperitoneum:None. Adenopathy:None present Osseous structures: Degenerative changes of the hips. The proximal femurs are intact. The pubic bones are intact. The sacroiliac joints are normal. Multilevel degenerative changes of the lumbar spine. Other findings: None     1. There appears to be debris or noncalcified calculi within the distal common bile duct. No evidence of cholecystitis at this time. 2. No pulmonary embolus. 3. Small right and trace left pleural effusions. 4. 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 Cardiology consult recommended. Electronically Signed: Yoni Rios MD  9/4/2024 8:06 PM EDT  Workstation ID: [id number]    CT Head Without Contrast    Result Date: 9/4/2024  CT HEAD WO CONTRAST Date of Exam: 9/4/2024 7:36 PM EDT Indication: confusion. Comparison: MRI brain 11/13/2023 Technique: Axial CT images were obtained of the head without contrast administration.  Automated exposure control and iterative construction methods were used. Findings: Negative for large territory loss of gray-white differentiation, acute intracranial hemorrhage, large parenchymal mass, midline shift or hydrocephalus. Stable incidental benign left temporal arachnoid cyst. Mild global parenchymal volume loss stable from  prior. Mild periventricular and subcortical hypodensity suggesting chronic microvascular ischemic change stable from prior. No large  extra-axial fluid collection. Symmetric appearing globes. Distal carotid and vertebral artery atherosclerotic plaque. Layering fluid in the right maxillary sinus with asymmetric mucoperiosteal thickening. This could reflect acute on chronic sinusitis in the right clinical setting. No large mastoid effusion. Negative for depressed skull fracture. Degenerative changes at the dens.     Impression: 1. No acute intracranial findings by CT. Chronic findings detailed above similar to previous MRI brain comparison. 2. Possible acute on chronic sinusitis in the right clinical setting, with layering fluid noted in the right maxillary sinus. Electronically Signed: Compa Dominguez MD  9/4/2024 7:56 PM EDT  Workstation ID: [id number]    XR Chest 1 View    Result Date: 9/4/2024  XR CHEST 1 VW Date of Exam: 9/4/2024 5:55 PM EDT Indication: Weak/Dizzy/AMS triage protocol Comparison: Chest radiograph 6/3/2024 Findings: Cardiomediastinal silhouette unchanged from prior. Platelike region of atelectasis versus scar left lower lobe similar to prior. Slight elevated left hemidiaphragm similar to prior. No new consolidation, edema, effusion or pneumothorax. Degenerative related osseous changes. Stable radiographic appearance of the chest since 6/3/2024.     Impression: No acute radiographic findings. Stable appearance of the chest since 6/3/2024 comparison. Electronically Signed: Compa Dominguez MD  9/4/2024 6:13 PM EDT  Workstation ID: [id number]             Results for orders placed during the hospital encounter of 01/25/24    Adult Transthoracic Echo Limited W/ Cont if Necessary Per Protocol    Interpretation Summary    Left ventricular systolic function is low normal. Calculated left ventricular EF = 47.8% Left ventricular ejection fraction appears to be 46 - 50%.      Plan for Follow-up of Pending Labs/Results:   Pending Labs       Order Current Status    Tissue Pathology Exam In process          Discharge Details        Discharge  Medications        New Medications        Instructions Start Date   levoFLOXacin 750 MG tablet  Commonly known as: Levaquin   750 mg, Oral, Daily      metroNIDAZOLE 500 MG tablet  Commonly known as: Flagyl   500 mg, Oral, 3 Times Daily             Continue These Medications        Instructions Start Date   albuterol sulfate  (90 Base) MCG/ACT inhaler  Commonly known as: PROVENTIL HFA;VENTOLIN HFA;PROAIR HFA   2 puffs, Inhalation, Every 4 Hours PRN      allopurinol 300 MG tablet  Commonly known as: ZYLOPRIM   300 mg, Oral, Daily      ascorbic acid 1000 MG tablet  Commonly known as: VITAMIN C   1,000 mg, Oral, 2 Times Daily      aspirin 81 MG EC tablet   81 mg, Oral, Daily, 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.      Coenzyme Q10 300 MG capsule   1 capsule, Oral, Nightly      docusate sodium 100 MG capsule  Commonly known as: COLACE   200 mg, Oral, Nightly PRN      donepezil 10 MG tablet  Commonly known as: ARICEPT   10 mg, Oral, Nightly      finasteride 5 MG tablet  Commonly known as: PROSCAR   5 mg, Oral, Every Night at Bedtime      furosemide 20 MG tablet  Commonly known as: LASIX   20 mg, Oral, Daily      Iron 240 (27 Fe) MG tablet   1 tablet, Oral, Daily      memantine 10 MG tablet  Commonly known as: NAMENDA   10 mg, Oral, 2 Times Daily      metFORMIN 1000 MG tablet  Commonly known as: GLUCOPHAGE   1,000 mg, Oral, 2 Times Daily      methenamine 1 g tablet  Commonly known as: HIPREX   1 g, Oral, 2 Times Daily With Meals      metoprolol tartrate 100 MG tablet  Commonly known as: LOPRESSOR   100 mg, Oral, 2 Times Daily      multivitamin with minerals tablet tablet   1 tablet, Oral, Nightly, Centrum Sliver       omeprazole 20 MG capsule  Commonly known as: priLOSEC   20 mg, Oral, Daily      pravastatin 40 MG tablet  Commonly known as: PRAVACHOL   40 mg, Oral, Daily      PROBIOTIC ADVANCED PO   1 tablet, Oral, 2 Times Daily      sertraline 50 MG tablet  Commonly known as: ZOLOFT   50 mg, Oral, Daily       tamsulosin 0.4 MG capsule 24 hr capsule  Commonly known as: FLOMAX   1 capsule, Oral, Daily      tiotropium bromide-olodaterol 2.5-2.5 MCG/ACT aerosol solution inhaler  Commonly known as: STIOLTO RESPIMAT   2 puffs, Inhalation, Daily, 2 inh once a day               Allergies   Allergen Reactions    Xarelto [Rivaroxaban] GI Bleeding     GI bleed    Sglt2 Inhibitors Other (See Comments)     Recurrent UTI    Penicillins Hives     Has tolerated cefepime, ceftriaxone, cefazolin, cefdinir, cefuroxime, cephalexin         Discharge Disposition:  Home or Self Care    Diet:  Hospital:  Diet Order   Procedures    Diet: Cardiac; Healthy Heart (2-3 Na+); Fluid Consistency: Thin (IDDSI 0)            Activity:  As tolerated    Restrictions or Other Recommendations:  None       CODE STATUS:    Code Status and Medical Interventions: CPR (Attempt to Resuscitate); Full Support   Ordered at: 09/05/24 0330     Level Of Support Discussed With:    Patient     Code Status (Patient has no pulse and is not breathing):    CPR (Attempt to Resuscitate)     Medical Interventions (Patient has pulse or is breathing):    Full Support       Future Appointments   Date Time Provider Department Center   9/10/2024 12:00 PM Pito Way MD [facility]   10/29/2024  9:30 AM Titus Oliveros IV, MD [facility]   11/13/2024  2:30 PM Charmaine Marte APRN [facility]   11/22/2024 11:30 AM Evangelina Hines APRN [facility]   12/5/2024 10:45 AM Anthony Mcdaniel MD [facility]   12/12/2024  1:15 PM [facility] CT 1 [facility]   12/12/2024  2:00 PM Rafa Lin APRN [facility] None       Additional Instructions for the Follow-ups that You Need to Schedule       Ambulatory Referral to [facility] [place] [facility]   As directed      In 5 days    Order Comments: In 5 days    Service Requested: Afib/Arrhythmia Clinic                      Clarence Samaniego MD  09/08/24      Time Spent on  Discharge:  I spent  29  minutes on this discharge activity which included: face-to-face encounter with the patient, reviewing the data in the system, coordination of the care with the nursing staff as well as consultants, documentation, and entering orders.

## 2024-09-08 NOTE — PLAN OF CARE
Goal Outcome Evaluation:         Patient to be discharged home with family today. 9/8/24.

## 2024-09-09 ENCOUNTER — PREP FOR SURGERY (OUTPATIENT)
Dept: OTHER | Facility: HOSPITAL | Age: 89
End: 2024-09-09
Payer: MEDICARE

## 2024-09-09 ENCOUNTER — TRANSITIONAL CARE MANAGEMENT TELEPHONE ENCOUNTER (OUTPATIENT)
Dept: CALL CENTER | Facility: HOSPITAL | Age: 89
End: 2024-09-09
Payer: MEDICARE

## 2024-09-09 DIAGNOSIS — K20.90 ESOPHAGITIS: ICD-10-CM

## 2024-09-09 DIAGNOSIS — Z46.89 ENCOUNTER FOR REMOVAL OF BILIARY STENT: Primary | ICD-10-CM

## 2024-09-09 DIAGNOSIS — Z98.890 HISTORY OF BILIARY STENT INSERTION: Primary | ICD-10-CM

## 2024-09-09 LAB
CYTO UR: NORMAL
LAB AP CASE REPORT: NORMAL
LAB AP CLINICAL INFORMATION: NORMAL
PATH REPORT.FINAL DX SPEC: NORMAL
PATH REPORT.GROSS SPEC: NORMAL

## 2024-09-09 NOTE — OUTREACH NOTE
Call Center TCM Note      Flowsheet Row Responses   [facility] patient discharged from? [place]   Does the patient have one of the following disease processes/diagnoses(primary or secondary)? Other   TCM attempt successful? Yes   Call start time 0840   Call end time 0841   Discharge diagnosis Choledocholithiasis   Is patient permission given to speak with other caregiver? Yes   List who call center can speak with Nikki daughter   Person spoke with today (if not patient) and relationship Nikki daughter   Meds reviewed with patient/caregiver? Yes   Is the patient having any side effects they believe may be caused by any medication additions or changes? No   Does the patient have all medications ordered at discharge? Yes   Is the patient taking all medications as directed (includes completed medication regime)? Yes   Comments Pt has a 15 min FU apt scheduled for 9/10/24-family member declined rescheduling apt   Does the patient have an appointment with their PCP within 7-14 days of discharge? Other   Nursing Interventions Routed TCM call to PCP office, PCP office requested to make appointment - message sent   Has home health visited the patient within 72 hours of discharge? N/A   What DME was ordered? Misc. DME (transport chair to be delivered 9/9/24 per daughter)   Did the patient receive a copy of their discharge instructions? Yes   Nursing interventions Reviewed instructions with patient   What is the patient's perception of their health status since discharge? Improving   Is the patient/caregiver able to teach back signs and symptoms related to disease process for when to call PCP? Yes   Is the patient/caregiver able to teach back signs and symptoms related to disease process for when to call 911? Yes   Is the patient/caregiver able to teach back the hierarchy of who to call/visit for symptoms/problems? PCP, Specialist, Home health nurse, Urgent Care, ED, 911 Yes   If the patient is a current smoker, are  they able to teach back resources for cessation? Not a smoker   TCM call completed? Yes   Call end time 0841            Anastasia Holm RN    9/9/2024, 08:42 EDT

## 2024-09-09 NOTE — PAYOR COMM NOTE
"Darien Camarena \"Isidro\" (88 y.o. Male)     [id number]     Lindsey Stout RN  Utilization Review  Pxznl-[phone]  [phone]        Date of Birth   1935    Social Security Number       Address   [address] [facility] [address]    Home Phone   [phone]    MRN   [id number]       Christianity   Moravian    Marital Status                               Admission Date   9/4/24    Admission Type   Emergency    Admitting Provider   Clarence Samaniego MD    Attending Provider       Department, Room/Bed   [facility] 6A, N621/1       Discharge Date   9/8/2024    Discharge Disposition   Home or Self Care    Discharge Destination                                 Attending Provider: (none)   Allergies: Xarelto [Rivaroxaban], Sglt2 Inhibitors, Penicillins    Isolation: None   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), 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), ESBL Klebsiella (06/09/23)   Code Status: Prior    Ht: 190.5 cm (75\")   Wt: 106 kg (234 lb 5.6 oz)    Admission Cmt: None   Principal Problem: Choledocholithiasis [K80.50]                   Active Insurance as of 9/4/2024       Primary Coverage       Payor Plan Insurance Group Employer/Plan Group    ANTH MEDICARE REPLACEMENT ANTH MEDICARE ADVANTAGE [id number]       Payor Plan Address Payor Plan Phone Number Payor Plan Fax Number Effective Dates    [address] [phone]  1/1/2024 - None Entered    [facility] [place] [id number]         Subscriber Name Subscriber Birth Date Member ID       DARINE CAMARENA 1935 [id number]                     Emergency Contacts        (Rel.) Home Phone Work Phone Mobile Phone    SHAWN KWON (Daughter) [phone] -- [phone]    NEIL QUINTERO (Daughter) [phone] -- [phone]    Migue Dolly Bray (Daughter) -- -- [phone]                 Discharge Summary        Clarence Samaniego MD at 09/08/24 1353              [facility] Medicine Services  DISCHARGE " SUMMARY    Patient Name: Darien Camarena  : 1935  MRN: [id number]    Date of Admission: 2024  5:45 PM  Date of Discharge:  2024  Primary Care Physician: Pito Way MD    Consults       Date and Time Order Name Status Description    2024  5:53 AM Inpatient Gastroenterology Consult Completed     2024  3:30 AM Inpatient Cardiology Consult Completed     2024  3:30 AM Inpatient General Surgery Consult Completed             Hospital Course     Presenting Problem:     Active Hospital Problems    Diagnosis  POA    **Choledocholithiasis [K80.50]  Yes    Severe malnutrition [E43]  Yes    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]  Yes    Presence of Watchman left atrial appendage closure device [Z95.818]  Yes    Coronary artery disease involving native coronary artery of native heart without angina pectoris [I25.10]  Yes    Stage 3 chronic kidney disease [N18.30]  Yes    Permanent atrial fibrillation [I48.21]  Yes    Type 2 diabetes mellitus with stage 3 chronic kidney disease, without long-term current use of insulin [E11.22, N18.30]  Yes    Hyperlipidemia LDL goal <100 [E78.5]  Yes    Essential hypertension [I10]  Yes      Resolved Hospital Problems   No resolved problems to display.          Hospital Course:  Darien Camarena is a 88 y.o. male with a history of afib, CKD, CAD, CHF, COPD, GERD, DM, STEVEN on CPAP, lung cancer, HTN, HLD, was presented to the hospital with jaundice.  CT abdomen with distal common bile duct noncalcified calculi.  Underwent ERCP with multiple stone extraction and stent placement.  Pus was drained which was concerning for cholangitis without overt signs of sepsis.  Started on IV Rocephin and Flagyl during hospitalization and transition to p.o. Augmentin upon discharge.  Cleared for discharge by cardiology, general surgery and GI services.  Appropriate referral given to the patient upon discharge     Obstructive  jaundice  Choledocholithiasis with cholangitis  Elevated LFT's  Alkaline phos 423, ast 74, alt 65, bili 8.1 on admission.  CT abdomen pelvis shows debris or noncalcified calculi within the distal common bile duct.    Underwent ERCP with multiple stone extraction, stent placement.  Pus noted.  Needs to follow-up with GI in 2 weeks after discharge.  Referral placed in epic  Status post IV Rocephin and Flagyl while hospitalized.  Transition to p.o. Augmentin upon discharge  General Surgery consulted for lap dg but currently no plans for surgical intervention.  Needs to follow with general surgery in 1 month.  Referral placed in epic     Chronic urine retention   Patient self cath 4 times a day  Urine culture grew Enterobacter cloacae, likely colonization.  The patient does not have any urinary symptoms or leukocytosis or any other signs of UTI.       CAD  A-fib with RVR status post Watchman device  S/P Cardizem drip for rate control.   Resume PO metoprolol 100 mg BID  Status post Watchman device.  Not on anticoagulation  On day of discharge, heart rate was 100-110.  Discussed with Dr. Pak/cardiology on-call and he cleared the patient for discharge stating that there is no need to add any other rate control medications     Type 2 diabetes   A1c 5.9%  SSI     COPD  As needed DuoNebs     CKD stage III  Creatinine stable around baseline of 1.1     Dementia  Depression  CT head shows no acute intracranial findings   Family stated that patient is at baseline     Debility  PT and OT consulted       Discharge Follow Up Recommendations for outpatient labs/diagnostics:  PCP 1 week  GI referral in 2 weeks (referral placed in [facility])  General surgery referral in 4 weeks (referral placed in epic)    Day of Discharge     HPI:   Patient seen and examined this morning.  Comfortable in bed.  No acute complaints today.  PT evaluated him and he was cleared for discharge home.  Does not have any chest pain or shortness of breath.  In  A-fib with heart rate in the 110-120 and cleared for discharge by cardiology team.  Daughter to come and pick him up    Vital Signs:   Temp:  [97 °F (36.1 °C)-98.4 °F (36.9 °C)] 98.2 °F (36.8 °C)  Heart Rate:  [] 114  Resp:  [16-18] 18  BP: (127-143)/() 131/91  Flow (L/min):  [2] 2      Physical Exam:  General: Comfortable, not in distress, conversant and cooperative  Head: Atraumatic and normocephalic  Eyes: No Icterus. No pallor  Ears:  Ears appear intact with no abnormalities noted  Throat: No oral lesions, no thrush  Neck: Supple, trachea midline  Lungs: Clear to auscultation bilaterally, equal air entry, no wheezing or crackles  Heart:  Normal S1 and S2, no murmur, no gallop, No JVD, no lower extremity swelling  Abdomen:  Soft, no tenderness, no organomegaly, normal bowel sounds, no organomegaly  Extremities: pulses equal bilaterally  Skin: No bleeding, bruising or rash, normal skin turgor and elasticity  Neurologic: Cranial nerves appear intact with no evidence of facial asymmetry, normal motor and sensory functions in all 4 extremities  Psych: Alert and oriented x 3, normal mood           Pertinent  and/or Most Recent Results     LAB RESULTS:      Lab 09/07/24  0430 09/06/24  0910 09/05/24  0713 09/05/24  0712 09/04/24  1821   WBC 8.73 4.82 5.84  --  7.14   HEMOGLOBIN 12.0* 12.9* 13.3  --  13.3   HEMATOCRIT 37.4* 38.8 40.6  --  41.6   PLATELETS 176 164 146  --  182   NEUTROS ABS 7.47* 4.20 4.31  --  5.81   IMMATURE GRANS (ABS) 0.04 0.01 0.03  --  0.01   LYMPHS ABS 0.73 0.45* 0.77  --  0.73   MONOS ABS 0.48 0.15 0.44  --  0.51   EOS ABS 0.00 0.00 0.27  --  0.07   MCV 91.7 89.2 90.2  --  89.5   PROTIME  --   --   --  17.1* 17.2*   APTT  --   --   --   --  34.0*         Lab 09/07/24  0430 09/06/24  0910 09/05/24  0713 09/04/24  1821   SODIUM 136 138 139 137   POTASSIUM 4.0 3.8 3.2* 3.6   CHLORIDE 100 100 101 100   CO2 25.0 24.0 27.0 27.7   ANION GAP 11.0 14.0 11.0 9.3   BUN 22 23 28* 32*   CREATININE  0.73* 0.68* 0.84 1.09   EGFR 88.1 90.0 84.4 65.7   GLUCOSE 146* 125* 82 112*   CALCIUM 9.0 8.9 8.6 9.0   MAGNESIUM  --  1.6  --  1.6   PHOSPHORUS  --  3.0  --   --          Lab 09/07/24  0430 09/06/24  0910 09/04/24  1821   TOTAL PROTEIN 5.7* 6.5 6.4   ALBUMIN 2.9* 2.9* 3.1*   GLOBULIN 2.8 3.6 3.3   ALT (SGPT) 36 42* 65*   AST (SGOT) 25 29 74*   BILIRUBIN 2.7* 3.7* 8.1*   ALK PHOS 345* 389* 423*   LIPASE  --   --  18         Lab 09/05/24  0712 09/04/24 2026 09/04/24  1821   HSTROP T  --  27* 30*   PROTIME 17.1*  --  17.2*   INR 1.38*  --  1.34*                 Brief Urine Lab Results  (Last result in the past 365 days)        Color   Clarity   Blood   Leuk Est   Nitrite   Protein   CREAT   Urine HCG        09/04/24 2148 Yellow   Clear   Negative   Small (1+)   Negative   Trace                 Microbiology Results (last 10 days)       Procedure Component Value - Date/Time    Urine Culture - Urine, Urine, Clean Catch [[id number]]  (Abnormal)  (Susceptibility) Collected: 09/04/24 2148    Lab Status: Final result Specimen: Urine, Clean Catch Updated: 09/08/24 1022     Urine Culture >100,000 CFU/mL Enterobacter cloacae complex     Comment:   This organism may develop resistance during prolonged therapy with 3rd generation cephalosporins (e.g. ceftriaxone) as a result of de-repression of AmpC B-lactamase.  Ceftriaxone may be a reasonable treatment option for uncomplicated cystitis or other lower severity infections when susceptibility is demonstrated.       Narrative:      Colonization of the urinary tract without infection is common. Treatment is discouraged unless the patient is symptomatic, pregnant, or undergoing an invasive urologic procedure.    Susceptibility        Enterobacter cloacae complex      JESSICA      Cefepime Susceptible      Ceftazidime Resistant      Ceftriaxone Resistant      Ertapenem Susceptible      Gentamicin Susceptible      Levofloxacin Susceptible      Nitrofurantoin Intermediate      Piperacillin  + Tazobactam Resistant      Trimethoprim + Sulfamethoxazole Susceptible                                   FL ERCP pancreatic and biliary ducts    Result Date: 9/5/2024  FL ERCP PANCREATIC AND BILIARY DUCTS Date of Exam: 9/5/2024 2:17 PM EDT Indication: ENDOSCOPIC RETROGRADE CHOLANGIOPANCREATOGRAPHY.   Comparison: None available. Technique:  A series of radiographic digital spot films were obtained in conjunction with an endoscopic catheterization of the biliary and pancreatic ductal system, performed by the gastroenterologist. Fluoroscopic Time: 2 minutes 48 seconds Number of Images: 11 Findings: Dictation is to record 2 minutes and 48 seconds of fluoroscopy time. A total of 11 images obtained show endoscope with side cannula placement, contrast injection of the common duct, apparent balloon sweep, and subsequent plastic stent placement. Please see the procedure report for full details.     Impression: Fluoroscopy provided during ERCP and stent placement. Electronically Signed: Oliverio Younger MD  9/5/2024 4:25 PM EDT  Workstation ID: [id number]    MRI abdomen wo contrast mrcp    Result Date: 9/5/2024  MRI ABDOMEN WO CONTRAST MRCP Date of Exam: 9/5/2024 4:42 AM EDT Indication: elevated LFTS, calculi within the distal common bile duct.  Comparison: CT abdomen pelvis dated September 4, 2024 Technique:  Routine multiplanar/multisequence images of the abdomen were obtained with MRCP sequences without contrast administration. Findings: Motion artifact degrades the image quality of this examination. There are small bilateral pleural effusions with areas of basilar atelectasis better described and seen on CT scan. Within the limitations of motion artifact, the pancreas is normal. The liver parenchyma is unremarkable. There are cysts of the left kidney. The right kidney is normal. The gallbladder is mildly distended but there is no evidence of wall thickening or pericholecystic fluid. The common bile duct is nondilated. However,  there are several small filling defects in the distal common bile duct consistent with nonobstructing choledocholithiasis. The largest stone is about 5 mm. The maximum common bile duct diameter is about 9 mm.     Impression: Choledocholithiasis with several small stones in the distal common bile duct. The largest stone is about 5 mm. The common bile duct is not dilated. Electronically Signed: Timothy Willoughby MD  9/5/2024 5:19 AM EDT  Workstation ID: [id number]    CT Abdomen Pelvis With Contrast    Result Date: 9/4/2024  CT ANGIOGRAM CHEST PULMONARY EMBOLISM, CT ABDOMEN PELVIS W CONTRAST Date of Exam: 9/4/2024 7:42 PM EDT Indication: soa. Comparison: 4/15/2024 Technique: Axial CT images were obtained of the chest and CT of the abdomen and pelvis after the uneventful intravenous administration of 86 cc Isovue-370 IV contrast utilizing pulmonary embolism protocol.  Reconstructed coronal and sagittal images were also obtained. Automated exposure control and iterative construction methods were used. Findings: The thyroid gland is normal. The subglottic airway is clear. No aortic dissection. 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 No pulmonary embolus. Small right and trace left pleural effusions. No pericardial effusion. Liver: 0.8 cm right hepatic cyst. Spleen:No masses. No perisplenic hematoma. Pancreas:No pancreatic masses. No evidence of pancreatitis. Gallbladder and common bile duct: Dilated gallbladder with extrahepatic biliary ductal dilatation. There appears to be material possibly noncalcified calculus within the still common bile duct resulting in partial obstruction. Adrenal glands:No adrenal masses Kidneys and ureters: Exophytic 2.3 cm left renal cyst. Exophytic 2 cm left renal cyst. Prominent left extrarenal pelvis and hydroureter on a chronic basis extending to the urinary bladder. Urinary bladder:No urinary bladder wall thickening. No bladder masses. Small bowel:Normal caliber small bowel. Large  bowel:No diverticulosis or diverticulitis. No large bowel masses are appreciated Appendix: Normal appendix. GENITOURINARY: Normal prostate Ascites or pneumoperitoneum:None. Adenopathy:None present Osseous structures: Degenerative changes of the hips. The proximal femurs are intact. The pubic bones are intact. The sacroiliac joints are normal. Multilevel degenerative changes of the lumbar spine. Other findings: None     1. There appears to be debris or noncalcified calculi within the distal common bile duct. No evidence of cholecystitis at this time. 2. No pulmonary embolus. 3. Small right and trace left pleural effusions. 4. 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 Cardiology consult recommended. Electronically Signed: Yoni Rios MD  9/4/2024 8:06 PM EDT  Workstation ID: [id number]    CT Angiogram Chest Pulmonary Embolism    Result Date: 9/4/2024  CT ANGIOGRAM CHEST PULMONARY EMBOLISM, CT ABDOMEN PELVIS W CONTRAST Date of Exam: 9/4/2024 7:42 PM EDT Indication: soa. Comparison: 4/15/2024 Technique: Axial CT images were obtained of the chest and CT of the abdomen and pelvis after the uneventful intravenous administration of 86 cc Isovue-370 IV contrast utilizing pulmonary embolism protocol.  Reconstructed coronal and sagittal images were also obtained. Automated exposure control and iterative construction methods were used. Findings: The thyroid gland is normal. The subglottic airway is clear. No aortic dissection. 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 No pulmonary embolus. Small right and trace left pleural effusions. No pericardial effusion. Liver: 0.8 cm right hepatic cyst. Spleen:No masses. No perisplenic hematoma. Pancreas:No pancreatic masses. No evidence of pancreatitis. Gallbladder and common bile duct: Dilated gallbladder with extrahepatic biliary ductal dilatation. There appears to be material possibly noncalcified calculus within the still common bile duct resulting in partial  obstruction. Adrenal glands:No adrenal masses Kidneys and ureters: Exophytic 2.3 cm left renal cyst. Exophytic 2 cm left renal cyst. Prominent left extrarenal pelvis and hydroureter on a chronic basis extending to the urinary bladder. Urinary bladder:No urinary bladder wall thickening. No bladder masses. Small bowel:Normal caliber small bowel. Large bowel:No diverticulosis or diverticulitis. No large bowel masses are appreciated Appendix: Normal appendix. GENITOURINARY: Normal prostate Ascites or pneumoperitoneum:None. Adenopathy:None present Osseous structures: Degenerative changes of the hips. The proximal femurs are intact. The pubic bones are intact. The sacroiliac joints are normal. Multilevel degenerative changes of the lumbar spine. Other findings: None     1. There appears to be debris or noncalcified calculi within the distal common bile duct. No evidence of cholecystitis at this time. 2. No pulmonary embolus. 3. Small right and trace left pleural effusions. 4. 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 Cardiology consult recommended. Electronically Signed: Yoni Rios MD  9/4/2024 8:06 PM EDT  Workstation ID: [id number]    CT Head Without Contrast    Result Date: 9/4/2024  CT HEAD WO CONTRAST Date of Exam: 9/4/2024 7:36 PM EDT Indication: confusion. Comparison: MRI brain 11/13/2023 Technique: Axial CT images were obtained of the head without contrast administration.  Automated exposure control and iterative construction methods were used. Findings: Negative for large territory loss of gray-white differentiation, acute intracranial hemorrhage, large parenchymal mass, midline shift or hydrocephalus. Stable incidental benign left temporal arachnoid cyst. Mild global parenchymal volume loss stable from  prior. Mild periventricular and subcortical hypodensity suggesting chronic microvascular ischemic change stable from prior. No large extra-axial fluid collection. Symmetric appearing globes. Distal  carotid and vertebral artery atherosclerotic plaque. Layering fluid in the right maxillary sinus with asymmetric mucoperiosteal thickening. This could reflect acute on chronic sinusitis in the right clinical setting. No large mastoid effusion. Negative for depressed skull fracture. Degenerative changes at the dens.     Impression: 1. No acute intracranial findings by CT. Chronic findings detailed above similar to previous MRI brain comparison. 2. Possible acute on chronic sinusitis in the right clinical setting, with layering fluid noted in the right maxillary sinus. Electronically Signed: Compa Dominguez MD  9/4/2024 7:56 PM EDT  Workstation ID: [id number]    XR Chest 1 View    Result Date: 9/4/2024  XR CHEST 1 VW Date of Exam: 9/4/2024 5:55 PM EDT Indication: Weak/Dizzy/AMS triage protocol Comparison: Chest radiograph 6/3/2024 Findings: Cardiomediastinal silhouette unchanged from prior. Platelike region of atelectasis versus scar left lower lobe similar to prior. Slight elevated left hemidiaphragm similar to prior. No new consolidation, edema, effusion or pneumothorax. Degenerative related osseous changes. Stable radiographic appearance of the chest since 6/3/2024.     Impression: No acute radiographic findings. Stable appearance of the chest since 6/3/2024 comparison. Electronically Signed: Compa Dominguez MD  9/4/2024 6:13 PM EDT  Workstation ID: [id number]             Results for orders placed during the hospital encounter of 01/25/24    Adult Transthoracic Echo Limited W/ Cont if Necessary Per Protocol    Interpretation Summary    Left ventricular systolic function is low normal. Calculated left ventricular EF = 47.8% Left ventricular ejection fraction appears to be 46 - 50%.      Plan for Follow-up of Pending Labs/Results:   Pending Labs       Order Current Status    Tissue Pathology Exam In process          Discharge Details        Discharge Medications        New Medications        Instructions Start Date    levoFLOXacin 750 MG tablet  Commonly known as: Levaquin   750 mg, Oral, Daily      metroNIDAZOLE 500 MG tablet  Commonly known as: Flagyl   500 mg, Oral, 3 Times Daily             Continue These Medications        Instructions Start Date   albuterol sulfate  (90 Base) MCG/ACT inhaler  Commonly known as: PROVENTIL HFA;VENTOLIN HFA;PROAIR HFA   2 puffs, Inhalation, Every 4 Hours PRN      allopurinol 300 MG tablet  Commonly known as: ZYLOPRIM   300 mg, Oral, Daily      ascorbic acid 1000 MG tablet  Commonly known as: VITAMIN C   1,000 mg, Oral, 2 Times Daily      aspirin 81 MG EC tablet   81 mg, Oral, Daily, 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.      Coenzyme Q10 300 MG capsule   1 capsule, Oral, Nightly      docusate sodium 100 MG capsule  Commonly known as: COLACE   200 mg, Oral, Nightly PRN      donepezil 10 MG tablet  Commonly known as: ARICEPT   10 mg, Oral, Nightly      finasteride 5 MG tablet  Commonly known as: PROSCAR   5 mg, Oral, Every Night at Bedtime      furosemide 20 MG tablet  Commonly known as: LASIX   20 mg, Oral, Daily      Iron 240 (27 Fe) MG tablet   1 tablet, Oral, Daily      memantine 10 MG tablet  Commonly known as: NAMENDA   10 mg, Oral, 2 Times Daily      metFORMIN 1000 MG tablet  Commonly known as: GLUCOPHAGE   1,000 mg, Oral, 2 Times Daily      methenamine 1 g tablet  Commonly known as: HIPREX   1 g, Oral, 2 Times Daily With Meals      metoprolol tartrate 100 MG tablet  Commonly known as: LOPRESSOR   100 mg, Oral, 2 Times Daily      multivitamin with minerals tablet tablet   1 tablet, Oral, Nightly, Centrum Sliver       omeprazole 20 MG capsule  Commonly known as: priLOSEC   20 mg, Oral, Daily      pravastatin 40 MG tablet  Commonly known as: PRAVACHOL   40 mg, Oral, Daily      PROBIOTIC ADVANCED PO   1 tablet, Oral, 2 Times Daily      sertraline 50 MG tablet  Commonly known as: ZOLOFT   50 mg, Oral, Daily      tamsulosin 0.4 MG capsule 24 hr capsule  Commonly known as:  FLOMAX   1 capsule, Oral, Daily      tiotropium bromide-olodaterol 2.5-2.5 MCG/ACT aerosol solution inhaler  Commonly known as: STIOLTO RESPIMAT   2 puffs, Inhalation, Daily, 2 inh once a day               Allergies   Allergen Reactions    Xarelto [Rivaroxaban] GI Bleeding     GI bleed    Sglt2 Inhibitors Other (See Comments)     Recurrent UTI    Penicillins Hives     Has tolerated cefepime, ceftriaxone, cefazolin, cefdinir, cefuroxime, cephalexin         Discharge Disposition:  Home or Self Care    Diet:  Hospital:  Diet Order   Procedures    Diet: Cardiac; Healthy Heart (2-3 Na+); Fluid Consistency: Thin (IDDSI 0)            Activity:  As tolerated    Restrictions or Other Recommendations:  None       CODE STATUS:    Code Status and Medical Interventions: CPR (Attempt to Resuscitate); Full Support   Ordered at: 09/05/24 0330     Level Of Support Discussed With:    Patient     Code Status (Patient has no pulse and is not breathing):    CPR (Attempt to Resuscitate)     Medical Interventions (Patient has pulse or is breathing):    Full Support       Future Appointments   Date Time Provider Department Center   9/10/2024 12:00 PM Pito Way MD [facility]   10/29/2024  9:30 AM Titus Oliveros IV, MD [facility]   11/13/2024  2:30 PM Charmaine Marte APRN [facility]   11/22/2024 11:30 AM Evangelina Hines APRN [facility]   12/5/2024 10:45 AM Anthony Mcdaniel MD [facility]   12/12/2024  1:15 PM [facility] CT 1  [facility]   12/12/2024  2:00 PM Rafa Lin APRN [facility] None       Additional Instructions for the Follow-ups that You Need to Schedule       Ambulatory Referral to [facility]   As directed      In 5 days    Order Comments: In 5 days    Service Requested: Afib/Arrhythmia Clinic                      Clarence Samaniego MD  09/08/24      Time Spent on Discharge:  I spent  29  minutes on this discharge activity which  included: face-to-face encounter with the patient, reviewing the data in the system, coordination of the care with the nursing staff as well as consultants, documentation, and entering orders.            Electronically signed by Clarence Samaniego MD at 09/08/24 4519

## 2024-09-09 NOTE — PROGRESS NOTES
Please schedule patient for repeat EGD and ERCP in 8 weeks to check for healing of the esophagus and for biliary stent removal and repeat cholangiogram and balloon sweep.    Pathology from recent upper endoscopy with biopsy as follows:    Biopsies from the esophagus show benign extensive ulceration.     Continue twice daily proton pump inhibitor.   Repeat EGD at same time as your ERCP in 8 weeks to ensure healing of the esophagus.

## 2024-09-10 ENCOUNTER — OFFICE VISIT (OUTPATIENT)
Dept: INTERNAL MEDICINE | Facility: CLINIC | Age: 89
End: 2024-09-10
Payer: MEDICARE

## 2024-09-10 ENCOUNTER — OFFICE VISIT (OUTPATIENT)
Dept: CARDIOLOGY | Facility: HOSPITAL | Age: 89
End: 2024-09-10
Payer: MEDICARE

## 2024-09-10 VITALS
RESPIRATION RATE: 18 BRPM | WEIGHT: 233 LBS | HEART RATE: 67 BPM | HEIGHT: 75 IN | SYSTOLIC BLOOD PRESSURE: 121 MMHG | DIASTOLIC BLOOD PRESSURE: 78 MMHG | BODY MASS INDEX: 28.97 KG/M2 | TEMPERATURE: 97.6 F | OXYGEN SATURATION: 95 %

## 2024-09-10 VITALS
SYSTOLIC BLOOD PRESSURE: 122 MMHG | DIASTOLIC BLOOD PRESSURE: 78 MMHG | WEIGHT: 231 LBS | HEART RATE: 96 BPM | TEMPERATURE: 97.7 F | BODY MASS INDEX: 28.87 KG/M2 | RESPIRATION RATE: 20 BRPM

## 2024-09-10 DIAGNOSIS — E11.9 TYPE 2 DIABETES MELLITUS WITHOUT COMPLICATION, WITHOUT LONG-TERM CURRENT USE OF INSULIN: ICD-10-CM

## 2024-09-10 DIAGNOSIS — K80.81 BILIARY CALCULUS OF OTHER SITE WITH OBSTRUCTION: Primary | ICD-10-CM

## 2024-09-10 DIAGNOSIS — I10 ESSENTIAL HYPERTENSION: ICD-10-CM

## 2024-09-10 DIAGNOSIS — Z95.818 PRESENCE OF WATCHMAN LEFT ATRIAL APPENDAGE CLOSURE DEVICE: ICD-10-CM

## 2024-09-10 DIAGNOSIS — R26.81 GAIT INSTABILITY: ICD-10-CM

## 2024-09-10 DIAGNOSIS — I50.22 HEART FAILURE WITH MILDLY REDUCED EJECTION FRACTION (HFMREF): ICD-10-CM

## 2024-09-10 DIAGNOSIS — K21.00 GASTROESOPHAGEAL REFLUX DISEASE WITH ESOPHAGITIS WITHOUT HEMORRHAGE: ICD-10-CM

## 2024-09-10 DIAGNOSIS — I48.21 PERMANENT ATRIAL FIBRILLATION: Primary | ICD-10-CM

## 2024-09-10 DIAGNOSIS — R29.898 MUSCULAR DECONDITIONING: ICD-10-CM

## 2024-09-10 DIAGNOSIS — I48.21 PERMANENT ATRIAL FIBRILLATION: ICD-10-CM

## 2024-09-10 LAB — BILIRUB CONJ SERPL-MCNC: 1.4 MG/DL (ref 0–0.3)

## 2024-09-10 PROCEDURE — 80053 COMPREHEN METABOLIC PANEL: CPT | Performed by: INTERNAL MEDICINE

## 2024-09-10 PROCEDURE — 1159F MED LIST DOCD IN RCRD: CPT | Performed by: NURSE PRACTITIONER

## 2024-09-10 PROCEDURE — 1111F DSCHRG MED/CURRENT MED MERGE: CPT | Performed by: INTERNAL MEDICINE

## 2024-09-10 PROCEDURE — 99214 OFFICE O/P EST MOD 30 MIN: CPT | Performed by: NURSE PRACTITIONER

## 2024-09-10 PROCEDURE — 82248 BILIRUBIN DIRECT: CPT | Performed by: INTERNAL MEDICINE

## 2024-09-10 PROCEDURE — 1126F AMNT PAIN NOTED NONE PRSNT: CPT | Performed by: INTERNAL MEDICINE

## 2024-09-10 PROCEDURE — 85025 COMPLETE CBC W/AUTO DIFF WBC: CPT | Performed by: INTERNAL MEDICINE

## 2024-09-10 PROCEDURE — 1160F RVW MEDS BY RX/DR IN RCRD: CPT | Performed by: NURSE PRACTITIONER

## 2024-09-10 PROCEDURE — 99495 TRANSJ CARE MGMT MOD F2F 14D: CPT | Performed by: INTERNAL MEDICINE

## 2024-09-10 RX ORDER — SODIUM CHLORIDE, SODIUM LACTATE, POTASSIUM CHLORIDE, CALCIUM CHLORIDE 600; 310; 30; 20 MG/100ML; MG/100ML; MG/100ML; MG/100ML
1000 INJECTION, SOLUTION INTRAVENOUS CONTINUOUS
OUTPATIENT
Start: 2024-09-10

## 2024-09-10 RX ORDER — INDOMETHACIN 100 MG
100 SUPPOSITORY, RECTAL RECTAL ONCE
OUTPATIENT
Start: 2024-09-10 | End: 2024-09-10

## 2024-09-10 NOTE — PATIENT INSTRUCTIONS
- THERESA GODFREY- General Surgery in approximately 1 month.    [phone]      - Office will call for final Watchman FARHAD in November

## 2024-09-10 NOTE — PROGRESS NOTES
"Chief Complaint  Atrial Fibrillation and Follow-up    Subjective      History of Present Illness {CC  Problem List  Visit  Diagnosis   Encounters  Notes  Medications  Labs  Result Review Imaging  Media :23}     Darien Camarena, 88 y.o. male with permanent AF s/p Watchman, HTN, GIB, HLD presents to [facility] for Atrial Fibrillation and Follow-up.    Patient recently hospitalized at [facility] after presenting with jaundice. CT abdomen with distal common bile duct noncalcified calculi. Underwent ERCP with multiple stone extraction and stent placement. Pus was drained which was concerning for cholangitis without overt signs of sepsis.  Started on IV Rocephin and Flagyl during hospitalization and transition to p.o. Augmentin upon discharge.  Evaluated by cardiology due to atrial fibrillation with RVR during acute illness, heart rates improved prior to discharge. Cleared for discharge by cardiology, general surgery and GI services.      Presents today feeling much improved since recent hospitalization.  Overall without cardiac symptoms.  Denies palpitations/tachypalpitation, chest pain, near syncope/syncope.  Reports volume status overall well-controlled.      Objective     Vital Signs:   Vitals:    09/10/24 1515   BP: 121/78   BP Location: Left arm   Patient Position: Sitting   Cuff Size: Adult   Pulse: 67   Resp: 18   Temp: 97.6 °F (36.4 °C)   TempSrc: Temporal   SpO2: 95%   Weight: 106 kg (233 lb)   Height: 190.5 cm (75\")     Body mass index is 29.12 kg/m².  Physical Exam  Vitals and nursing note reviewed.   Constitutional:       Appearance: Normal appearance.   HENT:      Head: Normocephalic.   Eyes:      Extraocular Movements: Extraocular movements intact.   Neck:      Vascular: No carotid bruit.   Cardiovascular:      Rate and Rhythm: Normal rate. Rhythm irregular.      Pulses: Normal pulses.      Heart sounds: Normal heart sounds, S1 normal and S2 normal. No " murmur heard.  Pulmonary:      Effort: Pulmonary effort is normal. No respiratory distress.      Breath sounds: Normal breath sounds.   Musculoskeletal:      Cervical back: Neck supple.      Right lower leg: No edema.      Left lower leg: No edema.   Skin:     General: Skin is warm and dry.   Neurological:      General: No focal deficit present.      Mental Status: He is alert.   Psychiatric:         Mood and Affect: Mood normal.         Behavior: Behavior normal.         Thought Content: Thought content normal.        Data Reviewed:{ Labs  Result Review  Imaging  Med Tab  Media :23}     CBC & Differential (09/07/2024 04:30)  Comprehensive Metabolic Panel (09/07/2024 04:30)  Magnesium (09/06/2024 09:10)    ECG 12 Lead ED Triage Standing Order; Weak / Dizzy / AMS (09/04/2024 17:56)   Adult Transthoracic Echo Limited W/ Cont if Necessary Per Protocol (01/27/2024 15:54)       Assessment & Plan   Assessment and Plan {CC Problem List  Visit Diagnosis  ROS  Review (Popup)  Health Maintenance  Quality  BestPractice  Medications  SmartSets  SnapShot Encounters  Media :23}     1. Permanent atrial fibrillation  -Recent AF with RVR in setting of acute illness. Heart rates improved at hospital discharge.  -Heart rate well-controlled during evaluation in clinic today  -S/p Watchman device. Continue ASA 81 Mg daily.  -Continue current rate control regimen of metoprolol tartrate 100 mg every 12 hours  -Continue EP follow-up as scheduled    2. Heart failure with mildly reduced ejection fraction (HFmrEF)  -TTE January 2024 with LVEF 48%.  -Appears well compensated, near euvolemic on exam today  -Reports overall volume status stable, doing well  -Afterload well-controlled today  -Continue metoprolol tartrate for AF rate control as above  -Continue furosemide 20 Mg daily as directed    3. Presence of Watchman left atrial appendage closure device  -s/p watchman placement by Dr. Mcdaniel on 11/28/23.    -45-day FARHAD revealed a  well-positioned watchman device with no significant stevan-prosthetic leak present.    -Continue aspirin 81mg daily monotherapy s/p LAAO placement.  -Discussed 1 year watchman follow-up imaging with planned FARHAD around 1 year anniversary date of watchman implant.        Follow Up {Instructions Charge Capture  Follow-up Communications :23}     Return if symptoms worsen or fail to improve.      Patient was given instructions and counseling regarding his condition or for health maintenance advice. Please see specific information pulled into the AVS if appropriate. Patient was instructed to call the [facility] with any questions, concerns, or worsening symptoms.    Dictated Utilizing Dragon Dictation   Please note that portions of this note were completed with a voice recognition program. Part of this note may be an electronic transcription/translation of spoken language to printed text using the Dragon Dictation System.

## 2024-09-11 PROBLEM — K20.90 ESOPHAGITIS: Status: ACTIVE | Noted: 2024-09-09

## 2024-09-11 PROBLEM — Z46.89 ENCOUNTER FOR REMOVAL OF BILIARY STENT: Status: ACTIVE | Noted: 2024-09-09

## 2024-09-11 LAB
ALBUMIN SERPL-MCNC: 2.9 G/DL (ref 3.5–5.2)
ALBUMIN/GLOB SERPL: 1 G/DL
ALP SERPL-CCNC: 249 U/L (ref 39–117)
ALT SERPL W P-5'-P-CCNC: 25 U/L (ref 1–41)
ANION GAP SERPL CALCULATED.3IONS-SCNC: 14 MMOL/L (ref 5–15)
AST SERPL-CCNC: 24 U/L (ref 1–40)
BASOPHILS # BLD AUTO: 0.04 10*3/MM3 (ref 0–0.2)
BASOPHILS NFR BLD AUTO: 0.5 % (ref 0–1.5)
BILIRUB SERPL-MCNC: 2 MG/DL (ref 0–1.2)
BUN SERPL-MCNC: 21 MG/DL (ref 8–23)
BUN/CREAT SERPL: 21.2 (ref 7–25)
CALCIUM SPEC-SCNC: 8.9 MG/DL (ref 8.6–10.5)
CHLORIDE SERPL-SCNC: 97 MMOL/L (ref 98–107)
CO2 SERPL-SCNC: 25 MMOL/L (ref 22–29)
CREAT SERPL-MCNC: 0.99 MG/DL (ref 0.76–1.27)
DEPRECATED RDW RBC AUTO: 57.9 FL (ref 37–54)
EGFRCR SERPLBLD CKD-EPI 2021: 73.7 ML/MIN/1.73
EOSINOPHIL # BLD AUTO: 0.18 10*3/MM3 (ref 0–0.4)
EOSINOPHIL NFR BLD AUTO: 2.4 % (ref 0.3–6.2)
ERYTHROCYTE [DISTWIDTH] IN BLOOD BY AUTOMATED COUNT: 17 % (ref 12.3–15.4)
GLOBULIN UR ELPH-MCNC: 3 GM/DL
GLUCOSE SERPL-MCNC: 133 MG/DL (ref 65–99)
HCT VFR BLD AUTO: 43.7 % (ref 37.5–51)
HGB BLD-MCNC: 14 G/DL (ref 13–17.7)
IMM GRANULOCYTES # BLD AUTO: 0.09 10*3/MM3 (ref 0–0.05)
IMM GRANULOCYTES NFR BLD AUTO: 1.2 % (ref 0–0.5)
LYMPHOCYTES # BLD AUTO: 1.16 10*3/MM3 (ref 0.7–3.1)
LYMPHOCYTES NFR BLD AUTO: 15.3 % (ref 19.6–45.3)
MCH RBC QN AUTO: 29.9 PG (ref 26.6–33)
MCHC RBC AUTO-ENTMCNC: 32 G/DL (ref 31.5–35.7)
MCV RBC AUTO: 93.4 FL (ref 79–97)
MONOCYTES # BLD AUTO: 0.59 10*3/MM3 (ref 0.1–0.9)
MONOCYTES NFR BLD AUTO: 7.8 % (ref 5–12)
NEUTROPHILS NFR BLD AUTO: 5.5 10*3/MM3 (ref 1.7–7)
NEUTROPHILS NFR BLD AUTO: 72.8 % (ref 42.7–76)
NRBC BLD AUTO-RTO: 0 /100 WBC (ref 0–0.2)
PLATELET # BLD AUTO: 231 10*3/MM3 (ref 140–450)
PMV BLD AUTO: 11.8 FL (ref 6–12)
POTASSIUM SERPL-SCNC: 3.6 MMOL/L (ref 3.5–5.2)
PROT SERPL-MCNC: 5.9 G/DL (ref 6–8.5)
QT INTERVAL: 306 MS
QTC INTERVAL: 419 MS
RBC # BLD AUTO: 4.68 10*6/MM3 (ref 4.14–5.8)
SODIUM SERPL-SCNC: 136 MMOL/L (ref 136–145)
WBC NRBC COR # BLD AUTO: 7.56 10*3/MM3 (ref 3.4–10.8)

## 2024-09-23 ENCOUNTER — TELEPHONE (OUTPATIENT)
Dept: INTERNAL MEDICINE | Facility: CLINIC | Age: 89
End: 2024-09-23
Payer: MEDICARE

## 2024-09-23 DIAGNOSIS — R43.2 LOSS OF TASTE: Primary | ICD-10-CM

## 2024-09-23 DIAGNOSIS — R63.4 WEIGHT LOSS: ICD-10-CM

## 2024-09-27 ENCOUNTER — TELEPHONE (OUTPATIENT)
Dept: UROLOGY | Facility: CLINIC | Age: 89
End: 2024-09-27
Payer: MEDICARE

## 2024-10-07 ENCOUNTER — DOCUMENTATION (OUTPATIENT)
Dept: PHYSICAL THERAPY | Facility: HOSPITAL | Age: 89
End: 2024-10-07
Payer: MEDICARE

## 2024-10-07 DIAGNOSIS — R60.0 BILATERAL LOWER EXTREMITY EDEMA: Primary | ICD-10-CM

## 2024-10-07 DIAGNOSIS — S81.801A MULTIPLE OPEN WOUNDS OF RIGHT LOWER LEG: ICD-10-CM

## 2024-10-07 DIAGNOSIS — S81.802D OPEN WOUND OF LEFT LOWER LEG, SUBSEQUENT ENCOUNTER: ICD-10-CM

## 2024-10-07 NOTE — THERAPY DISCHARGE NOTE
Outpatient Rehabilitation - Wound/Debridement Discharge Summary       Patient Name: Darien Camarena  : 1935  MRN: [id number]  Today's Date: 10/7/2024                  Admit Date: (Not on file)    Visit Dx:    ICD-10-CM ICD-9-CM   1. Bilateral lower extremity edema  R60.0 782.3   2. Open wound of left lower leg, subsequent encounter  S81.802D V58.89     891.0   3. Multiple open wounds of right lower leg  S81.801A 894.0       Patient Active Problem List   Diagnosis    Type 2 diabetes mellitus with stage 3 chronic kidney disease, without long-term current use of insulin    Gastroesophageal reflux disease with esophagitis    Hyperlipidemia LDL goal <100    Essential hypertension    Nephrolithiasis    Obstructive sleep apnea syndrome    Permanent atrial fibrillation    Stage 3 chronic kidney disease    Coronary artery disease involving native coronary artery of native heart without angina pectoris    Malignant neoplasm of lower lobe of left lung    H/O: GI bleed    Presence of Watchman left atrial appendage closure device    Heart failure with mildly reduced ejection fraction (HFmrEF)    Obesity    Choledocholithiasis    Severe malnutrition    Esophagitis    Encounter for removal of biliary stent        Past Medical History:   Diagnosis Date    Arrhythmia     Atrial fibrillation     Bilateral leg cramps     possible new medication related side effects    Chronic kidney disease     Clotting disorder     pt doesnt know about this    COPD (chronic obstructive pulmonary disease)     Coronary artery disease     Diabetes mellitus     doesnt check sugar    E. coli sepsis 2022    Enlarged prostate without lower urinary tract symptoms (luts) 2016    Full dentures     GERD (gastroesophageal reflux disease)     Gout     Hearing aid worn     bilat prn    History of colonoscopy 2012    History of radiation therapy 2023    SBRT LLL lung    [place] hearing[place]     hearing aids prn     Hyperlipidemia     Hypertension     Kidney stone     surgery x1    Lung cancer     STEVEN on CPAP     compliant with machine    PAF (paroxysmal atrial fibrillation)     Prostatism     Sleep apnea     CPAP HS    Urinary incontinence     Urinary tract infection     Wears glasses     readers        Past Surgical History:   Procedure Laterality Date    ATRIAL APPENDAGE EXCLUSION LEFT WITH TRANSESOPHAGEAL ECHOCARDIOGRAM N/A 11/28/2023    Procedure: Atrial Appendage Occlusion;  Surgeon: Anthony Mcdaniel MD;  Location:  [facility];  Service: Cardiovascular;  Laterality: N/A;    CARDIAC CATHETERIZATION Left 09/29/2022    Procedure: Left Heart Cath;  Surgeon: Titus Oliveros IV, MD;  Location:  [facility];  Service: Cardiovascular;  Laterality: Left;    CARDIOVERSION      CATARACT EXTRACTION Bilateral     COLONOSCOPY      CYSTOSCOPY      ENDOSCOPY      possible    ENDOSCOPY N/A 9/5/2024    Procedure: ESOPHAGOGASTRODUODENOSCOPY;  Surgeon: Anthony Arambula MD;  Location:  [facility];  Service: Gastroenterology;  Laterality: N/A;  Esophagus dilated to 18mm with 12mm-mm to 15mm, then 15mm to 18mm balloon.    ERCP N/A 9/5/2024    Procedure: ENDOSCOPIC RETROGRADE CHOLANGIOPANCREATOGRAPHY;  Surgeon: Anthony Arambula MD;  Location:  [facility];  Service: Gastroenterology;  Laterality: N/A;  Sphincterotomy made to ampula of common bile duct (CBD), CBD swept with 9mm-12mm balloon - sludge, stones and purulent appearing drainage extracted. 10x7 [place] biliary stent depolyed into CBD. ERCP scope removed with balloon intact.    KIDNEY STONE SURGERY      x1       Goals   PT OP Goals       Row Name 10/07/24 1100          PT Short Term Goals    STG 1 Pt will verbalize s/sx of infection and when to seek immediate medical care.  -     STG 1 Progress Met  -     STG 2 Pt will demonstrate 25% reduction in RLE wound areas to indicate healing progress.  -     STG 2 Progress Met  -     STG 3 Pt  will demonstrate only mild BLE edema to indicate appropriate edema management.  -     STG 3 Progress Met  -        Long Term Goals    LTG 1 Pt/caregivers will demonstrate independence with clean home dressing changes.  -     LTG 1 Progress Met  -     LTG 2 Pt will demonstrate 80% reduction in all wound areas to indicate healing progress.  -     LTG 2 Progress Partially Met  -     LTG 3 Pt/caregivers will demonstrate independence with appropriate edema management system for long term edema management.  -     LTG 3 Progress Partially Met  -               User Key  (r) = Recorded By, (t) = Taken By, (c) = Cosigned By      Initials Name Provider Type    Lindsey Maxwell, PT Physical Therapist                       OP Discharge Summary       Row Name 10/07/24 1140             OP PT Discharge Summary    Date of Discharge 06/23/24  -      Reason for Discharge Patient/Caregiver request  -      Outcomes Achieved Patient able to partially acheive established goals  -      Discharge Destination Home with caregiver assist  -      Discharge Instructions/Additional Comments Patient requested trial of home management due to wounds being nearly healed, was to call PRN for follow-up, did not return for tx.  -                User Key  (r) = Recorded By, (t) = Taken By, (c) = Cosigned By      Initials Name Provider Type    Lindsey Maxwell, PT Physical Therapist                    Lindsey Jane PT  10/7/2024

## 2024-10-10 ENCOUNTER — OFFICE VISIT (OUTPATIENT)
Dept: INTERNAL MEDICINE | Facility: CLINIC | Age: 89
End: 2024-10-10
Payer: MEDICARE

## 2024-10-10 VITALS
RESPIRATION RATE: 18 BRPM | HEART RATE: 96 BPM | BODY MASS INDEX: 28.75 KG/M2 | SYSTOLIC BLOOD PRESSURE: 128 MMHG | TEMPERATURE: 97.7 F | DIASTOLIC BLOOD PRESSURE: 74 MMHG | WEIGHT: 230 LBS

## 2024-10-10 DIAGNOSIS — Z23 IMMUNIZATION DUE: ICD-10-CM

## 2024-10-10 DIAGNOSIS — K80.81 BILIARY CALCULUS OF OTHER SITE WITH OBSTRUCTION: Primary | ICD-10-CM

## 2024-10-10 LAB
ALBUMIN SERPL-MCNC: 3.1 G/DL (ref 3.5–5.2)
ALBUMIN/GLOB SERPL: 1 G/DL
ALP SERPL-CCNC: 160 U/L (ref 39–117)
ALT SERPL W P-5'-P-CCNC: 32 U/L (ref 1–41)
ANION GAP SERPL CALCULATED.3IONS-SCNC: 8.6 MMOL/L (ref 5–15)
AST SERPL-CCNC: 48 U/L (ref 1–40)
BILIRUB SERPL-MCNC: 1.1 MG/DL (ref 0–1.2)
BUN SERPL-MCNC: 15 MG/DL (ref 8–23)
BUN/CREAT SERPL: 18.5 (ref 7–25)
CALCIUM SPEC-SCNC: 8.9 MG/DL (ref 8.6–10.5)
CHLORIDE SERPL-SCNC: 98 MMOL/L (ref 98–107)
CO2 SERPL-SCNC: 31.4 MMOL/L (ref 22–29)
CREAT SERPL-MCNC: 0.81 MG/DL (ref 0.76–1.27)
EGFRCR SERPLBLD CKD-EPI 2021: 85.3 ML/MIN/1.73
GLOBULIN UR ELPH-MCNC: 3.1 GM/DL
GLUCOSE SERPL-MCNC: 76 MG/DL (ref 65–99)
POTASSIUM SERPL-SCNC: 3.5 MMOL/L (ref 3.5–5.2)
PROT SERPL-MCNC: 6.2 G/DL (ref 6–8.5)
SODIUM SERPL-SCNC: 138 MMOL/L (ref 136–145)

## 2024-10-10 PROCEDURE — G0008 ADMIN INFLUENZA VIRUS VAC: HCPCS | Performed by: INTERNAL MEDICINE

## 2024-10-10 PROCEDURE — 80053 COMPREHEN METABOLIC PANEL: CPT | Performed by: INTERNAL MEDICINE

## 2024-10-10 PROCEDURE — 90662 IIV NO PRSV INCREASED AG IM: CPT | Performed by: INTERNAL MEDICINE

## 2024-10-10 PROCEDURE — 99213 OFFICE O/P EST LOW 20 MIN: CPT | Performed by: INTERNAL MEDICINE

## 2024-10-10 PROCEDURE — 1126F AMNT PAIN NOTED NONE PRSNT: CPT | Performed by: INTERNAL MEDICINE

## 2024-10-15 NOTE — PROGRESS NOTES
Chief Complaint   Patient presents with    Follow-up     Hospital follow up and 4 month follow up  [facility] 9/4 - 9/8 jaundice ERCP       History of Present Illness    The patient presents to the office for a follow-up visit from a recent hospitalization. The patient was hospitalized from 04-Sep-2024 through 08-Sep-2024 with cholecystitis. The records have been received and reviewed. The medication list has been reconciled. The hospital stay was uncomplicated.    Hospital Course:  Darien Camarena is a 88 y.o. male with a history of afib, CKD, CAD, CHF, COPD, GERD, DM, STEVEN on CPAP, lung cancer, HTN, HLD, was presented to the hospital with jaundice.  CT abdomen with distal common bile duct noncalcified calculi.  Underwent ERCP with multiple stone extraction and stent placement.  Pus was drained which was concerning for cholangitis without overt signs of sepsis.  Started on IV Rocephin and Flagyl during hospitalization and transition to p.o. Augmentin upon discharge.  Cleared for discharge by cardiology, general surgery and GI services.  Appropriate referral given to the patient upon discharge     Obstructive jaundice  Choledocholithiasis with cholangitis  Elevated LFT's  Alkaline phos 423, ast 74, alt 65, bili 8.1 on admission.  CT abdomen pelvis shows debris or noncalcified calculi within the distal common bile duct.    Underwent ERCP with multiple stone extraction, stent placement.  Pus noted.  Needs to follow-up with GI in 2 weeks after discharge.  Referral placed in epic  Status post IV Rocephin and Flagyl while hospitalized.  Transition to p.o. Augmentin upon discharge  General Surgery consulted for lap dg but currently no plans for surgical intervention.  Needs to follow with general surgery in 1 month.  Referral placed in epic        Since leaving the hospital he reports that he is improved. He denies abdominal pain, itchy watery eyes, bone pain, chills, congestion, a dry cough, a wet cough, decreased  appetite, diarrhea, dysuria, ear drainage, ear pain, eye drainage, facial pain, fever, a headache, joint pain, myalgias, nasal discharge, nausea, neck stiffness, night sweats, a rash, sneezing, a sore throat, vomiting or wheezing. He also reports that he does not have chest pain, dyspnea, edema, syncope, blurred vision or palpitations.    His blood sugars are stable.  GERD symptoms are stable.  Hypertension is stable with blood pressures that are normal.  He continues his medication without side effects.  He has lower extremity weakness and instability of gait.      Medications      Current Outpatient Medications:     albuterol sulfate  (90 Base) MCG/ACT inhaler, Inhale 2 puffs Every 4 (Four) Hours As Needed for Wheezing., Disp: 54 g, Rfl: 1    allopurinol (ZYLOPRIM) 300 MG tablet, Take 1 tablet by mouth Daily., Disp: 90 tablet, Rfl: 1    ascorbic acid (VITAMIN C) 1000 MG tablet, Take 1 tablet by mouth 2 (Two) Times a Day., Disp: , Rfl:     aspirin 81 MG EC tablet, Take 1 tablet by mouth Daily. 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., Disp: 90 tablet, Rfl: 1    Coenzyme Q10 300 MG capsule, Take 1 capsule by mouth Every Night., Disp: , Rfl:     docusate sodium (COLACE) 100 MG capsule, Take 2 capsules by mouth At Night As Needed for Constipation., Disp: , Rfl:     donepezil (ARICEPT) 10 MG tablet, Take 1 tablet by mouth Every Night., Disp: 90 tablet, Rfl: 1    Ferrous Gluconate (IRON) 240 (27 FE) MG tablet, Take 1 tablet by mouth Daily., Disp: , Rfl:     finasteride (PROSCAR) 5 MG tablet, Take 1 tablet by mouth every night at bedtime., Disp: 90 tablet, Rfl: 1    furosemide (LASIX) 20 MG tablet, Take 1 tablet by mouth Daily., Disp: 90 tablet, Rfl: 1    memantine (NAMENDA) 10 MG tablet, Take 1 tablet by mouth twice daily, Disp: 180 tablet, Rfl: 1    metFORMIN (GLUCOPHAGE) 1000 MG tablet, Take 1 tablet by mouth 2 (Two) Times a Day., Disp: 180 tablet, Rfl: 1    methenamine (HIPREX) 1 g tablet, Take 1 tablet by  mouth 2 (Two) Times a Day With Meals., Disp: 180 tablet, Rfl: 1    metoprolol tartrate (LOPRESSOR) 100 MG tablet, Take 1 tablet by mouth 2 (Two) Times a Day., Disp: 180 tablet, Rfl: 1    multivitamin with minerals (MULTIVITAMIN MEN 50+ PO), Take 1 tablet by mouth Every Night. Centrum Sliver, Disp: , Rfl:     omeprazole (priLOSEC) 20 MG capsule, Take 1 capsule by mouth once daily (Patient taking differently: Take 1 capsule by mouth 2 (Two) Times a Day.), Disp: 90 capsule, Rfl: 1    pravastatin (PRAVACHOL) 40 MG tablet, Take 1 tablet by mouth once daily, Disp: 90 tablet, Rfl: 0    Probiotic Product (PROBIOTIC ADVANCED PO), Take 1 tablet by mouth 2 (Two) Times a Day., Disp: , Rfl:     sertraline (ZOLOFT) 50 MG tablet, Take 1 tablet by mouth once daily, Disp: 90 tablet, Rfl: 1    tamsulosin (FLOMAX) 0.4 MG capsule 24 hr capsule, Take 1 capsule by mouth Daily., Disp: , Rfl:     tiotropium bromide-olodaterol (STIOLTO RESPIMAT) 2.5-2.5 MCG/ACT aerosol solution inhaler, Inhale 2 puffs Daily. 2 inh once a day, Disp: 3 each, Rfl: 3   Current outpatient and discharge medications have been reconciled for the patient.  Reviewed by: Pito Way MD    Allergies    Allergies   Allergen Reactions    Xarelto [Rivaroxaban] GI Bleeding     GI bleed    Sglt2 Inhibitors Other (See Comments)     Recurrent UTI    Penicillins Hives     Has tolerated cefepime, ceftriaxone, cefazolin, cefdinir, cefuroxime, cephalexin       Problem List    Patient Active Problem List   Diagnosis    Type 2 diabetes mellitus with stage 3 chronic kidney disease, without long-term current use of insulin    Gastroesophageal reflux disease with esophagitis    Hyperlipidemia LDL goal <100    Essential hypertension    Nephrolithiasis    Obstructive sleep apnea syndrome    Permanent atrial fibrillation    Stage 3 chronic kidney disease    Coronary artery disease involving native coronary artery of native heart without angina pectoris    Malignant neoplasm of  lower lobe of left lung    H/O: GI bleed    Presence of Watchman left atrial appendage closure device    Heart failure with mildly reduced ejection fraction (HFmrEF)    Obesity    Choledocholithiasis    Severe malnutrition    Esophagitis    Encounter for removal of biliary stent       Medications, Allergies, Problems List and Past History were reviewed and updated.    Physical Examination    /78 (BP Location: Left arm, Patient Position: Sitting, Cuff Size: Adult)   Pulse 96   Temp 97.7 °F (36.5 °C) (Infrared)   Resp 20   Wt 105 kg (231 lb)   BMI 28.87 kg/m²       HEENT: Head- Normocephalic Atraumatic. Facies- Within normal limits. Pinnas- Normal texture and shape bilaterally. Canals- Normal bilaterally. TMs- Normal bilaterally. Nares- Patent bilaterally. Nasal Septum- is normal. There is no tenderness to palpation over the frontal or maxillary sinuses. Lids- Normal bilaterally. Conjunctiva- Clear bilaterally. Sclera- Anicteric bilaterally. Oropharynx- Moist with no lesions. Tonsils- No enlargement, erythema or exudate.    Neck: Thyroid- non enlarged, symmetric and has no nodules. No bruits are detected. ROM- Normal Range of Motion with no rigidity.    Lungs: Auscultation- Clear to auscultation bilaterally. There are no retractions, clubbing or cyanosis. The Expiratory to Inspiratory ratio is equal.    Lymph Nodes: Cervical- no enlarged lymph nodes noted. Clavicular- Deferred. Axillary- Deferred. Inguinal- Deferred.    Cardiovascular: There are no carotid bruits. Heart- Normal Rate with Regular rhythm and no murmurs. There are no gallops. There are no rubs. In the lower extremities there is no edema. The upper extremities do not have edema.    Abdomen: Soft, benign, non-tender with no masses, hernias, organomegaly or scars.    Diagnoses and all orders for this visit:    1. Biliary calculus of other site with obstruction (Primary)  -     CBC & Differential; Future  -     Comprehensive Metabolic Panel;  Future  -     Hepatic Function Panel; Future  -     CBC & Differential  -     Comprehensive Metabolic Panel  -     Cancel: Hepatic Function Panel  -     Bilirubin, Direct    2. Permanent atrial fibrillation  -     CBC & Differential; Future  -     CBC & Differential    3. Type 2 diabetes mellitus without complication, without long-term current use of insulin    4. Gastroesophageal reflux disease with esophagitis without hemorrhage    5. Essential hypertension  -     CBC & Differential; Future  -     Comprehensive Metabolic Panel; Future  -     CBC & Differential  -     Comprehensive Metabolic Panel    6. Muscular deconditioning  -     Ambulatory Referral to Physical Therapy for Evaluation & Treatment    7. Gait instability  -     Ambulatory Referral to Physical Therapy for Evaluation & Treatment      Transitional Care Management Certification  I certify that the following are true:  Communication was made within 2 business days of discharge.  Complexity of Medical Decision Making is moderate.  Face to face visit occurred within 2 days.    *Note: 78180 is for high complexity patients with a face to face visit within 7 days of discharge.  01178 is for high complexity patients with a face to face on days 8-14 post discharge or medium complexity with face to face visit within 14 days post discharge.            Return to Office    The patient was instructed to return for follow-up in 1 month. The patient was instructed to return sooner if the condition changes, worsens, or does not resolve.

## 2024-10-15 NOTE — PROGRESS NOTES
Chief Complaint   Patient presents with    Follow-up     1 month follow up chronic medical problems       History of Present Illness    The patient presents for a follow-up related to cholecystitis. He reports no symptoms of nausea, vomiting, diarrhea or abdominal pain. The symptoms are not associated with fever. The patient has not had excessive weight loss.    Medications      Current Outpatient Medications:     albuterol sulfate  (90 Base) MCG/ACT inhaler, Inhale 2 puffs Every 4 (Four) Hours As Needed for Wheezing., Disp: 54 g, Rfl: 1    allopurinol (ZYLOPRIM) 300 MG tablet, Take 1 tablet by mouth Daily., Disp: 90 tablet, Rfl: 1    ascorbic acid (VITAMIN C) 1000 MG tablet, Take 1 tablet by mouth 2 (Two) Times a Day., Disp: , Rfl:     aspirin 81 MG EC tablet, Take 1 tablet by mouth Daily. 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., Disp: 90 tablet, Rfl: 1    Coenzyme Q10 300 MG capsule, Take 1 capsule by mouth Every Night., Disp: , Rfl:     docusate sodium (COLACE) 100 MG capsule, Take 2 capsules by mouth At Night As Needed for Constipation., Disp: , Rfl:     donepezil (ARICEPT) 10 MG tablet, Take 1 tablet by mouth Every Night., Disp: 90 tablet, Rfl: 1    Ferrous Gluconate (IRON) 240 (27 FE) MG tablet, Take 1 tablet by mouth Daily., Disp: , Rfl:     finasteride (PROSCAR) 5 MG tablet, Take 1 tablet by mouth every night at bedtime., Disp: 90 tablet, Rfl: 1    furosemide (LASIX) 20 MG tablet, Take 1 tablet by mouth Daily., Disp: 90 tablet, Rfl: 1    memantine (NAMENDA) 10 MG tablet, Take 1 tablet by mouth twice daily, Disp: 180 tablet, Rfl: 1    metFORMIN (GLUCOPHAGE) 1000 MG tablet, Take 1 tablet by mouth 2 (Two) Times a Day., Disp: 180 tablet, Rfl: 1    methenamine (HIPREX) 1 g tablet, Take 1 tablet by mouth 2 (Two) Times a Day With Meals., Disp: 180 tablet, Rfl: 1    metoprolol tartrate (LOPRESSOR) 100 MG tablet, Take 1 tablet by mouth 2 (Two) Times a Day., Disp: 180 tablet, Rfl: 1    multivitamin with  minerals (MULTIVITAMIN MEN 50+ PO), Take 1 tablet by mouth Every Night. Centrum Sliver, Disp: , Rfl:     omeprazole (priLOSEC) 20 MG capsule, Take 1 capsule by mouth once daily (Patient taking differently: Take 1 capsule by mouth 2 (Two) Times a Day.), Disp: 90 capsule, Rfl: 1    pravastatin (PRAVACHOL) 40 MG tablet, Take 1 tablet by mouth once daily, Disp: 90 tablet, Rfl: 0    Probiotic Product (PROBIOTIC ADVANCED PO), Take 1 tablet by mouth 2 (Two) Times a Day., Disp: , Rfl:     sertraline (ZOLOFT) 50 MG tablet, Take 1 tablet by mouth once daily, Disp: 90 tablet, Rfl: 1    tamsulosin (FLOMAX) 0.4 MG capsule 24 hr capsule, Take 1 capsule by mouth Daily., Disp: , Rfl:     tiotropium bromide-olodaterol (STIOLTO RESPIMAT) 2.5-2.5 MCG/ACT aerosol solution inhaler, Inhale 2 puffs Daily. 2 inh once a day, Disp: 3 each, Rfl: 3     Allergies    Allergies   Allergen Reactions    Xarelto [Rivaroxaban] GI Bleeding     GI bleed    Sglt2 Inhibitors Other (See Comments)     Recurrent UTI    Penicillins Hives     Has tolerated cefepime, ceftriaxone, cefazolin, cefdinir, cefuroxime, cephalexin       Problem List    Patient Active Problem List   Diagnosis    Type 2 diabetes mellitus with stage 3 chronic kidney disease, without long-term current use of insulin    Gastroesophageal reflux disease with esophagitis    Hyperlipidemia LDL goal <100    Essential hypertension    Nephrolithiasis    Obstructive sleep apnea syndrome    Permanent atrial fibrillation    Stage 3 chronic kidney disease    Coronary artery disease involving native coronary artery of native heart without angina pectoris    Malignant neoplasm of lower lobe of left lung    H/O: GI bleed    Presence of Watchman left atrial appendage closure device    Heart failure with mildly reduced ejection fraction (HFmrEF)    Obesity    Choledocholithiasis    Severe malnutrition    Esophagitis    Encounter for removal of biliary stent       Medications, Allergies, Problems List and  Past History were reviewed and updated.    Physical Examination    /74 (BP Location: Right arm, Patient Position: Sitting, Cuff Size: Adult)   Pulse 96   Temp 97.7 °F (36.5 °C) (Infrared)   Resp 18   Wt 104 kg (230 lb)   BMI 28.75 kg/m²       HEENT: Head- Normocephalic Atraumatic. Facies- Within normal limits. Pinnas- Normal texture and shape bilaterally. Canals- Normal bilaterally. TMs- Normal bilaterally. Nares- Patent bilaterally. Nasal Septum- is normal. There is no tenderness to palpation over the frontal or maxillary sinuses. Lids- Normal bilaterally. Conjunctiva- Clear bilaterally. Sclera- Anicteric bilaterally. Oropharynx- Moist with no lesions. Tonsils- No enlargement, erythema or exudate.    Neck: Thyroid- non enlarged, symmetric and has no nodules. No bruits are detected. ROM- Normal Range of Motion with no rigidity.    Lungs: Auscultation- Clear to auscultation bilaterally. There are no retractions, clubbing or cyanosis. The Expiratory to Inspiratory ratio is equal.    Lymph Nodes: Cervical- no enlarged lymph nodes noted.    Cardiovascular: There are no carotid bruits. Heart- Normal Rate with Regular rhythm and no murmurs. There are no gallops. There are no rubs. In the lower extremities there is no edema. The upper extremities do not have edema.    Abdomen: Soft, benign, non-tender with no masses, hernias, organomegaly or scars.    Diagnoses and all orders for this visit:    1. Biliary calculus of other site with obstruction (Primary)  -     Comprehensive Metabolic Panel; Future  -     Comprehensive Metabolic Panel    2. Immunization due  -     Fluzone High-Dose 65+yrs          Return to Office    The patient was instructed to return for follow-up in 1 month. The patient was instructed to return sooner if the condition changes, worsens, or does not resolve.

## 2024-10-24 ENCOUNTER — TELEPHONE (OUTPATIENT)
Dept: INTERNAL MEDICINE | Facility: CLINIC | Age: 89
End: 2024-10-24
Payer: MEDICARE

## 2024-10-24 NOTE — TELEPHONE ENCOUNTER
Caller: SHAWN KWON    Relationship: Emergency Contact    Best call back number: [phone]     What was the call regarding: PT CALLED DAUGHTER REPORTING THAT PATIENT IS SHORT OF BREATH, BLOOD PRESSURE IS HIGHER THAT NORMAL BOTTOM NUMBER . PATIENT ALSO APPEARS TO HAVE THRUSH. HEART RATE AND OXYGEN

## 2024-10-24 NOTE — TELEPHONE ENCOUNTER
Patient daughter called, patient is having high blood pressure and shortness of breath. The bottom number was all that she has, she said it was 110 which is higher than normal for patient. n

## 2024-10-25 ENCOUNTER — PRE-ADMISSION TESTING (OUTPATIENT)
Dept: PREADMISSION TESTING | Facility: HOSPITAL | Age: 89
End: 2024-10-25
Payer: MEDICARE

## 2024-10-25 ENCOUNTER — ANESTHESIA EVENT (OUTPATIENT)
Dept: GASTROENTEROLOGY | Facility: HOSPITAL | Age: 89
End: 2024-10-25
Payer: MEDICARE

## 2024-10-25 ENCOUNTER — TELEPHONE (OUTPATIENT)
Dept: CARDIOLOGY | Facility: CLINIC | Age: 89
End: 2024-10-25
Payer: MEDICARE

## 2024-10-25 VITALS — WEIGHT: 245.81 LBS | BODY MASS INDEX: 30.56 KG/M2 | HEIGHT: 75 IN

## 2024-10-25 DIAGNOSIS — M1A.9XX0 CHRONIC GOUT WITHOUT TOPHUS, UNSPECIFIED CAUSE, UNSPECIFIED SITE: ICD-10-CM

## 2024-10-25 DIAGNOSIS — F32.9 REACTIVE DEPRESSION: ICD-10-CM

## 2024-10-25 DIAGNOSIS — R41.3 MEMORY LOSS: ICD-10-CM

## 2024-10-25 DIAGNOSIS — E78.5 HYPERLIPIDEMIA LDL GOAL <100: ICD-10-CM

## 2024-10-25 LAB
DEPRECATED RDW RBC AUTO: 57.1 FL (ref 37–54)
ERYTHROCYTE [DISTWIDTH] IN BLOOD BY AUTOMATED COUNT: 16 % (ref 12.3–15.4)
HCT VFR BLD AUTO: 38.6 % (ref 37.5–51)
HGB BLD-MCNC: 12.4 G/DL (ref 13–17.7)
MCH RBC QN AUTO: 31.2 PG (ref 26.6–33)
MCHC RBC AUTO-ENTMCNC: 32.1 G/DL (ref 31.5–35.7)
MCV RBC AUTO: 97 FL (ref 79–97)
PLATELET # BLD AUTO: 185 10*3/MM3 (ref 140–450)
PMV BLD AUTO: 10.7 FL (ref 6–12)
POTASSIUM SERPL-SCNC: 3.9 MMOL/L (ref 3.5–5.2)
RBC # BLD AUTO: 3.98 10*6/MM3 (ref 4.14–5.8)
WBC NRBC COR # BLD AUTO: 6.84 10*3/MM3 (ref 3.4–10.8)

## 2024-10-25 PROCEDURE — 84132 ASSAY OF SERUM POTASSIUM: CPT

## 2024-10-25 PROCEDURE — 36415 COLL VENOUS BLD VENIPUNCTURE: CPT

## 2024-10-25 PROCEDURE — 85027 COMPLETE CBC AUTOMATED: CPT

## 2024-10-25 PROCEDURE — 93005 ELECTROCARDIOGRAM TRACING: CPT

## 2024-10-25 PROCEDURE — 93010 ELECTROCARDIOGRAM REPORT: CPT | Performed by: INTERNAL MEDICINE

## 2024-10-25 NOTE — TELEPHONE ENCOUNTER
Caller: SHAWN KWON    Relationship: Emergency Contact    Best call back number: [phone]     Requested Prescriptions:   Requested Prescriptions     Pending Prescriptions Disp Refills    metoprolol tartrate (LOPRESSOR) 100 MG tablet 180 tablet 1     Sig: Take 1 tablet by mouth 2 (Two) Times a Day.    methenamine (HIPREX) 1 g tablet 180 tablet 1     Sig: Take 1 tablet by mouth 2 (Two) Times a Day With Meals.    allopurinol (ZYLOPRIM) 300 MG tablet 90 tablet 1     Sig: Take 1 tablet by mouth Daily.    furosemide (LASIX) 20 MG tablet 90 tablet 1     Sig: Take 1 tablet by mouth Daily.    memantine (NAMENDA) 10 MG tablet 180 tablet 1     Sig: Take 1 tablet by mouth 2 (Two) Times a Day.    metFORMIN (GLUCOPHAGE) 1000 MG tablet 180 tablet 1     Sig: Take 1 tablet by mouth 2 (Two) Times a Day.    sertraline (ZOLOFT) 50 MG tablet 90 tablet 1     Sig: Take 1 tablet by mouth Daily.    finasteride (PROSCAR) 5 MG tablet 90 tablet 1     Sig: Take 1 tablet by mouth every night at bedtime.    pravastatin (PRAVACHOL) 40 MG tablet 90 tablet 0     Sig: Take 1 tablet by mouth Daily.    donepezil (ARICEPT) 10 MG tablet 90 tablet 1     Sig: Take 1 tablet by mouth Every Night.    omeprazole (priLOSEC) 20 MG capsule 90 capsule 1     Sig: Take 1 capsule by mouth Daily.        Pharmacy where request should be sent: [facility], [address] [phone] [place] [phone] FX     Last office visit with prescribing clinician: 10/10/2024   Last telemedicine visit with prescribing clinician: Visit date not found   Next office visit with prescribing clinician: 12/17/2024     Additional details provided by patient: SHAWN KWON     Does the patient have less than a 3 day supply:  [x] Yes  [] No    Would you like a call back once the refill request has been completed: [x] Yes [] No    If the office needs to give you a call back, can they leave a voicemail: [x] Yes [] No    Claudio Hawthorne  Rep   10/25/24 15:13 EDT

## 2024-10-25 NOTE — PAT
Verified patient previously completed cardiology visit for cardiac risk assessment in preparation for upcoming procedure, completion of 12-lead ECG within six months, and risk assessment letter reviewed. No further interventions required.       Cle[place] by Dr Ami CORDOBA on 10/25/24.  Chart forwarded to pre op.

## 2024-10-25 NOTE — TELEPHONE ENCOUNTER
Last OV 6/10/24  Next OV 10/29/24  Last EKG 10/25/24  Last echo 1/27/24  Cardiac Clearance request from Pat in PAT.  Dr. Dior is doing an ERCP and EGD on 11/1/24.  Fax [phone].  Patient is on aspirin.

## 2024-10-26 LAB
QT INTERVAL: 360 MS
QTC INTERVAL: 484 MS

## 2024-10-27 RX ORDER — ALLOPURINOL 300 MG/1
300 TABLET ORAL DAILY
Qty: 90 TABLET | Refills: 1 | Status: SHIPPED | OUTPATIENT
Start: 2024-10-27

## 2024-10-27 RX ORDER — METOPROLOL TARTRATE 100 MG
100 TABLET ORAL 2 TIMES DAILY
Qty: 180 TABLET | Refills: 1 | Status: SHIPPED | OUTPATIENT
Start: 2024-10-27

## 2024-10-27 RX ORDER — PRAVASTATIN SODIUM 40 MG
40 TABLET ORAL DAILY
Qty: 90 TABLET | Refills: 1 | Status: SHIPPED | OUTPATIENT
Start: 2024-10-27

## 2024-10-27 RX ORDER — FUROSEMIDE 20 MG
20 TABLET ORAL DAILY
Qty: 90 TABLET | Refills: 1 | Status: SHIPPED | OUTPATIENT
Start: 2024-10-27

## 2024-10-27 RX ORDER — METHENAMINE HIPPURATE 1000 MG/1
1 TABLET ORAL 2 TIMES DAILY WITH MEALS
Qty: 180 TABLET | Refills: 1 | Status: SHIPPED | OUTPATIENT
Start: 2024-10-27

## 2024-10-27 RX ORDER — FINASTERIDE 5 MG/1
5 TABLET, FILM COATED ORAL
Qty: 90 TABLET | Refills: 1 | Status: SHIPPED | OUTPATIENT
Start: 2024-10-27

## 2024-10-27 RX ORDER — DONEPEZIL HYDROCHLORIDE 10 MG/1
10 TABLET, FILM COATED ORAL NIGHTLY
Qty: 90 TABLET | Refills: 1 | Status: SHIPPED | OUTPATIENT
Start: 2024-10-27

## 2024-10-27 RX ORDER — MEMANTINE HYDROCHLORIDE 10 MG/1
10 TABLET ORAL 2 TIMES DAILY
Qty: 180 TABLET | Refills: 1 | Status: SHIPPED | OUTPATIENT
Start: 2024-10-27

## 2024-10-28 NOTE — PROGRESS NOTES
"    Cardiology Outpatient Visit      Identification: Darien Camarena is a 89 y.o. male who resides in [place], [place].     Reason for visit:  Permanent atrial fibrillation  CAD      Joaquim Mallory returns to the office today accompanied by his daughter.  His daughter has concerns about him living at home alone, driving, etc.  She states that he has been getting confused at times.  She is preparing to spend the winter in [place] and worries that in her absence he may \"get in trouble\".    He also reports worsening swelling of his lower extremities.  He is scheduled for biliary stent removal later this week.  He is in chronic atrial fibrillation.  He denies palpitations, angina.    Review of Systems   Cardiovascular:  Positive for leg swelling.   Respiratory:  Positive for shortness of breath.        Allergies   Allergen Reactions    Xarelto [Rivaroxaban] GI Bleeding     GI bleed    Sglt2 Inhibitors Other (See Comments)     Recurrent UTI    Penicillins Hives     Has tolerated cefepime, ceftriaxone, cefazolin, cefdinir, cefuroxime, cephalexin         Current Outpatient Medications   Medication Instructions    albuterol sulfate  (90 Base) MCG/ACT inhaler 2 puffs, Inhalation, Every 4 Hours PRN    allopurinol (ZYLOPRIM) 300 mg, Oral, Daily    ascorbic acid (VITAMIN C) 1,000 mg, 2 Times Daily    aspirin 81 mg, Oral, Daily, 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.    Coenzyme Q10 300 MG capsule 1 capsule, Nightly    docusate sodium (COLACE) 200 mg, Nightly PRN    donepezil (ARICEPT) 10 mg, Oral, Nightly    Ferrous Gluconate (IRON) 240 (27 FE) MG tablet 1 tablet, Daily    finasteride (PROSCAR) 5 mg, Oral, Every Night at Bedtime    furosemide (LASIX) 40 mg, Oral, Daily    memantine (NAMENDA) 10 mg, Oral, 2 Times Daily    metFORMIN (GLUCOPHAGE) 1,000 mg, Oral, 2 Times Daily    methenamine (HIPREX) 1 g, Oral, 2 Times Daily With Meals    metoprolol tartrate (LOPRESSOR) 100 mg, Oral, 2 Times Daily    multivitamin " "with minerals (MULTIVITAMIN MEN 50+ PO) 1 tablet, Nightly    omeprazole (PRILOSEC) 20 mg, Oral, 2 Times Daily    potassium chloride 10 MEQ CR tablet 20 mEq, Oral, Daily    pravastatin (PRAVACHOL) 40 mg, Oral, Daily    Probiotic Product (PROBIOTIC ADVANCED PO) 1 tablet, 2 Times Daily    sertraline (ZOLOFT) 50 mg, Oral, Daily    tamsulosin (FLOMAX) 0.4 MG capsule 24 hr capsule 1 capsule, Daily    tiotropium bromide-olodaterol (STIOLTO RESPIMAT) 2.5-2.5 MCG/ACT aerosol solution inhaler 2 puffs, Inhalation, Daily, 2 inh once a day         Objective     /78 (BP Location: Left arm, Patient Position: Sitting, Cuff Size: Adult)   Pulse 109   Ht 190.5 cm (75\")   Wt 111 kg (245 lb)   SpO2 95%   BMI 30.62 kg/m²       Constitutional:       Appearance: Healthy appearance.   Eyes:      General: No scleral icterus.  Neck:      Thyroid: No thyroid mass.      Vascular: No carotid bruit or JVD. JVD normal.   Pulmonary:      Effort: Pulmonary effort is normal.      Breath sounds: Normal breath sounds.   Cardiovascular:      Normal rate. Irregularly irregular rhythm.      Murmurs: There is no murmur.      No gallop.    Edema:     Peripheral edema present.  Skin:     General: Skin is warm. There is no cyanosis.   Neurological:      General: No focal deficit present.      Mental Status: Alert.   Psychiatric:         Attention and Perception: Attention normal.         Result Review  (reviewed with patient):        ECG 12 Lead    Date/Time: 10/29/2024 1:01 PM  Performed by: Titus Oliveros IV, MD    Authorized by: Titus Oliveros IV, MD           Lab Results   Component Value Date    GLUCOSE 76 10/10/2024    BUN 15 10/10/2024    CREATININE 0.81 10/10/2024     10/10/2024    K 3.9 10/25/2024    CL 98 10/10/2024    CALCIUM 8.9 10/10/2024    PROTEINTOT 6.2 10/10/2024    ALBUMIN 3.1 (L) 10/10/2024    ALT 32 10/10/2024    AST 48 (H) 10/10/2024    ALKPHOS 160 (H) 10/10/2024    BILITOT 1.1 10/10/2024    GLOB " 3.1 10/10/2024    AGRATIO 1.0 10/10/2024    BCR 18.5 10/10/2024    ANIONGAP 8.6 10/10/2024    EGFR 85.3 10/10/2024     Lab Results   Component Value Date    WBC 6.84 10/25/2024    HGB 12.4 (L) 10/25/2024    HCT 38.6 10/25/2024    MCV 97.0 10/25/2024     10/25/2024     Lab Results   Component Value Date    CHOL 75 05/07/2024    TRIG 83 05/07/2024    HDL 26 (L) 05/07/2024    LDL 32 05/07/2024     Lab Results   Component Value Date    HGBA1C 5.90 (H) 05/07/2024           Assessment     Diagnoses and all orders for this visit:    1. Heart failure with mildly reduced ejection fraction (HFmrEF) (Primary)  Overview:  Cardiac catheterization (2022): Mild CAD. Normal LV filling pressure  Echo (8/8/2022): EF 50%.  Anatomically and functionally normal valves  FARHAD (1/12/2024): LVEF 40%.  27 mm Watchman device well-seated.  No thrombus or periprosthetic flow.  Mild MR but no significant valvular abnormality  Echo (1/27/2024): LVEF 48%    Assessment & Plan:  Stage C heart failure with NYHA class III symptoms  Increase furosemide to 40 mg daily  Start potassium chloride 20 mEq daily  FU with Deanne Arreola at [facility] in 2 weeks for reassessment      Orders:  -     furosemide (LASIX) 40 MG tablet; Take 1 tablet by mouth Daily.  Dispense: 90 tablet; Refill: 3  -     potassium chloride 10 MEQ CR tablet; Take 2 tablets by mouth Daily.  Dispense: 180 tablet; Refill: 3  -     Comprehensive Metabolic Panel; Future  -     proBNP; Future    2. Coronary artery disease involving native coronary artery of native heart without angina pectoris  Overview:  Cardiac catheterization (9/29/2022): Mild CAD.  Normal LV filling pressure      3. Permanent atrial fibrillation  Overview:  Diagnosed 2012.   FARHAD-guided ECV, 8/17/2012  CHADS-VASc 5 (age > 75, CAD, HTN, DM)  Multiple cardioversions, 2018, 2019, 2020, 2021  Unsuccessful cardioversion with conversion to rate control strategy, 2023  Echo (8/8/2022): EF 50%, anatomically and  functionally normal valves  Watchman implant by Anthony Mcdaniel, 11/28/2023  FARHAD (1/12/2024): LVEF 40%.  27 mm Watchman device well-seated.  No thrombus or periprosthetic flow.   Limited TTE (1/27/2024): LVEF 48%    Assessment & Plan:  Asymptomatic  Continue aspirin for stroke prophylaxis (no NOAC due to Watchman device)  Continue metoprolol for rate control    Orders:  -     metoprolol tartrate (LOPRESSOR) 100 MG tablet; Take 1 tablet by mouth 2 (Two) Times a Day.  Dispense: 180 tablet; Refill: 1  -     aspirin 81 MG EC tablet; Take 1 tablet by mouth Daily. 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.  Dispense: 90 tablet; Refill: 1    4. Essential hypertension  Overview:  Target blood pressure <130/80 mmHg      5. Hyperlipidemia LDL goal <100  Overview:  Moderate intensity statin therapy reasonable due to diabetic status    Assessment & Plan:   Continue pravastatin 40 mg daily    Orders:  -     pravastatin (PRAVACHOL) 40 MG tablet; Take 1 tablet by mouth Daily.  Dispense: 90 tablet; Refill: 1    Other orders  -     metFORMIN (GLUCOPHAGE) 1000 MG tablet; Take 1 tablet by mouth 2 (Two) Times a Day.  -     ECG 12 Lead          Plan   Increase furosemide to 40 mg daily  Start potassium chloride 20 mill equivalents daily  CMP, proBNP in 2 weeks  Follow-up with Deanne Arreola at [facility] in 2 weeks for reassessment of heart failure      Follow-up   Return in about 6 months (around 4/29/2025).        Taurus Oliveros MD, [facility], [facility]  10/29/2024

## 2024-10-29 ENCOUNTER — OFFICE VISIT (OUTPATIENT)
Dept: CARDIOLOGY | Facility: CLINIC | Age: 89
End: 2024-10-29
Payer: MEDICARE

## 2024-10-29 VITALS
OXYGEN SATURATION: 95 % | HEIGHT: 75 IN | DIASTOLIC BLOOD PRESSURE: 78 MMHG | WEIGHT: 245 LBS | HEART RATE: 109 BPM | SYSTOLIC BLOOD PRESSURE: 112 MMHG | BODY MASS INDEX: 30.46 KG/M2

## 2024-10-29 DIAGNOSIS — I48.21 PERMANENT ATRIAL FIBRILLATION: ICD-10-CM

## 2024-10-29 DIAGNOSIS — I10 ESSENTIAL HYPERTENSION: Chronic | ICD-10-CM

## 2024-10-29 DIAGNOSIS — E78.5 HYPERLIPIDEMIA LDL GOAL <100: Chronic | ICD-10-CM

## 2024-10-29 DIAGNOSIS — I50.22 HEART FAILURE WITH MILDLY REDUCED EJECTION FRACTION (HFMREF): Primary | ICD-10-CM

## 2024-10-29 DIAGNOSIS — I25.10 CORONARY ARTERY DISEASE INVOLVING NATIVE CORONARY ARTERY OF NATIVE HEART WITHOUT ANGINA PECTORIS: ICD-10-CM

## 2024-10-29 PROBLEM — I48.0 AF (PAROXYSMAL ATRIAL FIBRILLATION): Status: RESOLVED | Noted: 2023-11-28 | Resolved: 2024-10-29

## 2024-10-29 PROBLEM — I50.23 ACUTE ON CHRONIC SYSTOLIC HEART FAILURE: Status: RESOLVED | Noted: 2024-01-29 | Resolved: 2024-10-29

## 2024-10-29 PROCEDURE — 93000 ELECTROCARDIOGRAM COMPLETE: CPT | Performed by: INTERNAL MEDICINE

## 2024-10-29 PROCEDURE — 99214 OFFICE O/P EST MOD 30 MIN: CPT | Performed by: INTERNAL MEDICINE

## 2024-10-29 RX ORDER — POTASSIUM CHLORIDE 750 MG/1
20 TABLET, EXTENDED RELEASE ORAL DAILY
Qty: 180 TABLET | Refills: 3 | Status: SHIPPED | OUTPATIENT
Start: 2024-10-29

## 2024-10-29 RX ORDER — FUROSEMIDE 40 MG/1
40 TABLET ORAL DAILY
Qty: 90 TABLET | Refills: 3 | Status: SHIPPED | OUTPATIENT
Start: 2024-10-29

## 2024-10-29 RX ORDER — METOPROLOL TARTRATE 100 MG/1
100 TABLET ORAL 2 TIMES DAILY
Qty: 180 TABLET | Refills: 1 | Status: SHIPPED | OUTPATIENT
Start: 2024-10-29

## 2024-10-29 RX ORDER — PRAVASTATIN SODIUM 40 MG
40 TABLET ORAL DAILY
Qty: 90 TABLET | Refills: 1 | Status: SHIPPED | OUTPATIENT
Start: 2024-10-29

## 2024-10-29 RX ORDER — ASPIRIN 81 MG/1
81 TABLET ORAL DAILY
Qty: 90 TABLET | Refills: 1 | Status: SHIPPED | OUTPATIENT
Start: 2024-10-29

## 2024-10-29 NOTE — ASSESSMENT & PLAN NOTE
Asymptomatic  Continue aspirin for stroke prophylaxis (no NOAC due to Watchman device)  Continue metoprolol for rate control

## 2024-10-29 NOTE — ASSESSMENT & PLAN NOTE
Stage C heart failure with NYHA class III symptoms  Increase furosemide to 40 mg daily  Start potassium chloride 20 mEq daily  FU with Deanne Arreola at [facility] in 2 weeks for reassessment

## 2024-10-30 ENCOUNTER — HOSPITAL ENCOUNTER (OUTPATIENT)
Dept: NUTRITION | Facility: HOSPITAL | Age: 89
Setting detail: RECURRING SERIES
Discharge: HOME | End: 2024-10-30
Payer: MEDICARE

## 2024-10-30 PROCEDURE — 97802 MEDICAL NUTRITION INDIV IN: CPT

## 2024-10-30 NOTE — CONSULTS
[facility]          Initial 60 Minute Nutrition Visit    Date: 10/30/2024   Patient Name: Darien Camarena  : 1935   MRN: [id number]   Referring Provider: Pito Way MD    Reason for Visit: unintentional weight loss  Visit Format: IP    Nutrition Assessment       Social History:   Social History     Socioeconomic History    Marital status:    Tobacco Use    Smoking status: Former     Current packs/day: 0.00     Average packs/day: 1 pack/day for 15.0 years (15.0 ttl pk-yrs)     Types: Cigarettes     Start date: 1947     Quit date: 1960     Years since quittin.5     Passive exposure: Past    Smokeless tobacco: Former     Types: Chew     Quit date:     Tobacco comments:     started at 13 yo.   Vaping Use    Vaping status: Never Used    Passive vaping exposure: Yes   Substance and Sexual Activity    Alcohol use: No    Drug use: Never    Sexual activity: Defer     Partners: Female     Active Problem List:   Patient Active Problem List    Diagnosis     Esophagitis [K20.90]     Encounter for removal of biliary stent [Z46.89]     Severe malnutrition [E43]     Choledocholithiasis [K80.50]     Obesity [E66.9]     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]     Presence of Watchman left atrial appendage closure device [Z95.818]     H/O: GI bleed [Z87.19]     Malignant neoplasm of lower lobe of left lung [C34.32]     Coronary artery disease involving native coronary artery of native heart without angina pectoris [I25.10]     Stage 3 chronic kidney disease [N18.30]     Permanent atrial fibrillation [I48.21]     Type 2 diabetes mellitus with stage 3 chronic kidney disease, without long-term current use of insulin [E11.22, N18.30]     Gastroesophageal reflux disease with esophagitis [K21.00]     Hyperlipidemia LDL goal <100 [E78.5]     Essential hypertension [I10]     Nephrolithiasis [N20.0]     Obstructive sleep apnea syndrome  [G47.33]       Current Medications:   Current Outpatient Medications:     albuterol sulfate  (90 Base) MCG/ACT inhaler, Inhale 2 puffs Every 4 (Four) Hours As Needed for Wheezing., Disp: 54 g, Rfl: 1    allopurinol (ZYLOPRIM) 300 MG tablet, Take 1 tablet by mouth Daily., Disp: 90 tablet, Rfl: 1    ascorbic acid (VITAMIN C) 1000 MG tablet, Take 1 tablet by mouth 2 (Two) Times a Day., Disp: , Rfl:     aspirin 81 MG EC tablet, Take 1 tablet by mouth Daily. 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., Disp: 90 tablet, Rfl: 1    Coenzyme Q10 300 MG capsule, Take 1 capsule by mouth Every Night., Disp: , Rfl:     docusate sodium (COLACE) 100 MG capsule, Take 2 capsules by mouth At Night As Needed for Constipation., Disp: , Rfl:     donepezil (ARICEPT) 10 MG tablet, Take 1 tablet by mouth Every Night., Disp: 90 tablet, Rfl: 1    Ferrous Gluconate (IRON) 240 (27 FE) MG tablet, Take 1 tablet by mouth Daily., Disp: , Rfl:     finasteride (PROSCAR) 5 MG tablet, Take 1 tablet by mouth every night at bedtime., Disp: 90 tablet, Rfl: 1    furosemide (LASIX) 40 MG tablet, Take 1 tablet by mouth Daily., Disp: 90 tablet, Rfl: 3    memantine (NAMENDA) 10 MG tablet, Take 1 tablet by mouth 2 (Two) Times a Day., Disp: 180 tablet, Rfl: 1    metFORMIN (GLUCOPHAGE) 1000 MG tablet, Take 1 tablet by mouth 2 (Two) Times a Day., Disp: , Rfl:     methenamine (HIPREX) 1 g tablet, Take 1 tablet by mouth 2 (Two) Times a Day With Meals., Disp: 180 tablet, Rfl: 1    metoprolol tartrate (LOPRESSOR) 100 MG tablet, Take 1 tablet by mouth 2 (Two) Times a Day., Disp: 180 tablet, Rfl: 1    multivitamin with minerals (MULTIVITAMIN MEN 50+ PO), Take 1 tablet by mouth Every Night. Centrum Gloryiver, Disp: , Rfl:     omeprazole (priLOSEC) 20 MG capsule, Take 1 capsule by mouth 2 (Two) Times a Day., Disp: 180 capsule, Rfl: 1    potassium chloride 10 MEQ CR tablet, Take 2 tablets by mouth Daily., Disp: 180 tablet, Rfl: 3    pravastatin (PRAVACHOL) 40 MG  "tablet, Take 1 tablet by mouth Daily., Disp: 90 tablet, Rfl: 1    Probiotic Product (PROBIOTIC ADVANCED PO), Take 1 tablet by mouth 2 (Two) Times a Day., Disp: , Rfl:     sertraline (ZOLOFT) 50 MG tablet, Take 1 tablet by mouth Daily., Disp: 90 tablet, Rfl: 1    tamsulosin (FLOMAX) 0.4 MG capsule 24 hr capsule, Take 1 capsule by mouth Daily., Disp: , Rfl:     tiotropium bromide-olodaterol (STIOLTO RESPIMAT) 2.5-2.5 MCG/ACT aerosol solution inhaler, Inhale 2 puffs Daily. 2 inh once a day, Disp: 3 each, Rfl: 3    Labs: n/a    Hunger Vital Sign Food Insecurity Assessment:  Within the past 12 months I/we worried whether our food would run out before I/we got money to buy more: no   Within the past 12 months the food I/we bought just didn't last and I/we didn't have money to get more: no   Use of food assistance programs (WIC, food stamps, food basilio) no       Food & Nutrition Related History       Food Allergies: n/a  Food Intolerances: n/a  Food Behavior: n/a  Nutrition Impact Symptoms:  none  Gastrointestinal conditions that impact intake or food choices: n/a  Details at home: Patient does not prepare meals at home very often. His daughter does most of the shopping  Who prepares most meals: most meals are ate out or frozen meals    Who does grocery shopping: Daughter    How many meals are purchased from fast food/sit down restaurants per week:  most   Difficulty chewin - Mild - Fatigue with solid food   Difficulty swallowin - Normal  Diet requirement related to personal preference or cultural belief: in general, a \"healthy\" diet  , low salt  History of eating disorder/disordered eating habits: None  Language/communication details: English  Barriers to learning: No barriers identified at this time        Anthropometrics      Height:   Ht Readings from Last 1 Encounters:   10/29/24 190.5 cm (75\")     Weight:   Wt Readings from Last 3 Encounters:   10/29/24 111 kg (245 lb)   10/25/24 112 kg (245 lb 13 oz) " "  10/10/24 104 kg (230 lb)     BMI: There is no height or weight on file to calculate BMI.   Weight Change: n/a     Physical Activity         Physical activity comments: not discussed      Estimated Needs     Estimated Energy Needs: not discussed     Estimated Protein Needs: >100g/day      Estimated Fluid Needs: n/a     Discussion / Education      Patient is an 89 year old male referred for unintentional weight loss and loss of appetite. Patient was accompanied by his daughter for today's visit. She states that he has lost around 40 pounds in the last 7 months. He states that he was losing his taste around the beginning of the weight loss. He did not have much of an appetite. He states more recently his taste has came back and he is eating much more. His appetite is good now and has no issues. He has gained some weight back. He is not sure how much. He states that he does have congestive heart failure and has some fluid built up. Some of the weight gain could be due to the fluid. His daughter states that he was told to follow a low sodium diet but he does not follow. He eats out for most of his meals and when he does eat at home he eats \"junk food\" and frozen meals.     We discussed ways to increase protein intake to prevent muscle wasting. We discussed what foods have protein and ensuring there is a protein food with each meal. He states that he does currently eat consistent meals throughout the day but will snack on foods like jello and cookies. Recommended he choose healthier snack options with some protein. Provided patient with snack ideas to increase protein. He states that he will drink the Boost Breeze. Encouraged patient to try other protein supplements with more protein. Provided patient with examples. His daughter had questions regarding the low sodium diet. We discussed was to lower sodium and recommended less than 2,000mg per day. Provided patient with a list of foods and the sodium content in each. " Provided patient with lower sodium frozen meal options. Patient and daughter had no further questions. Encouraged patient to reach out with any further questions.     Assessment of patient engagement: Engaged    Measurement of understanding: Patient verbalized understanding    Resources Provided: 3 macronutrient handout, protein supplement handout, ways to lower sodium, sodium content in foods, high calorie high protein nutrition therapy, frozen meal packet      Goal (s)      Goal 1: Add a protein food with each meal      Goal 2: do not skip meals          Plan of Care     PES Statement:   Inadequate oral intake related to skipping meals and not eating balanced meals as evidenced by dietary recall and interview.     Follow Up Visit      Follow Up:   No f/u scheduled at this time     Total of 60 minutes spent with patient on nutrition counseling. Education based on Academy of Nutrition and Dietetics guidelines. Patient was provided with RD's contact information. Thank you for this referral.

## 2024-10-31 ENCOUNTER — ANESTHESIA (OUTPATIENT)
Dept: GASTROENTEROLOGY | Facility: HOSPITAL | Age: 89
End: 2024-10-31
Payer: MEDICARE

## 2024-10-31 ENCOUNTER — HOSPITAL ENCOUNTER (OUTPATIENT)
Facility: HOSPITAL | Age: 89
Setting detail: HOSPITAL OUTPATIENT SURGERY
Discharge: HOME | End: 2024-10-31
Attending: INTERNAL MEDICINE | Admitting: INTERNAL MEDICINE
Payer: MEDICARE

## 2024-10-31 ENCOUNTER — APPOINTMENT (OUTPATIENT)
Dept: GENERAL RADIOLOGY | Facility: HOSPITAL | Age: 89
End: 2024-10-31
Payer: MEDICARE

## 2024-10-31 VITALS
OXYGEN SATURATION: 95 % | RESPIRATION RATE: 21 BRPM | HEART RATE: 94 BPM | SYSTOLIC BLOOD PRESSURE: 131 MMHG | TEMPERATURE: 98.2 F | DIASTOLIC BLOOD PRESSURE: 107 MMHG

## 2024-10-31 DIAGNOSIS — K20.90 ESOPHAGITIS: ICD-10-CM

## 2024-10-31 DIAGNOSIS — Z46.89 ENCOUNTER FOR REMOVAL OF BILIARY STENT: ICD-10-CM

## 2024-10-31 LAB — GLUCOSE BLDC GLUCOMTR-MCNC: 86 MG/DL (ref 70–130)

## 2024-10-31 PROCEDURE — 43275 ERCP REMOVE FORGN BODY DUCT: CPT | Performed by: INTERNAL MEDICINE

## 2024-10-31 PROCEDURE — C1889 IMPLANT/INSERT DEVICE, NOC: HCPCS | Performed by: INTERNAL MEDICINE

## 2024-10-31 PROCEDURE — 25010000002 LIDOCAINE PF 1% 1 % SOLUTION: Performed by: NURSE ANESTHETIST, CERTIFIED REGISTERED

## 2024-10-31 PROCEDURE — 82948 REAGENT STRIP/BLOOD GLUCOSE: CPT

## 2024-10-31 PROCEDURE — 25010000002 PROPOFOL 10 MG/ML EMULSION: Performed by: NURSE ANESTHETIST, CERTIFIED REGISTERED

## 2024-10-31 PROCEDURE — 43264 ERCP REMOVE DUCT CALCULI: CPT | Performed by: INTERNAL MEDICINE

## 2024-10-31 PROCEDURE — 25010000002 ESMOLOL 100 MG/10ML SOLUTION: Performed by: NURSE ANESTHETIST, CERTIFIED REGISTERED

## 2024-10-31 PROCEDURE — 25810000003 SODIUM CHLORIDE 0.9 % SOLUTION: Performed by: NURSE ANESTHETIST, CERTIFIED REGISTERED

## 2024-10-31 PROCEDURE — 43235 EGD DIAGNOSTIC BRUSH WASH: CPT | Performed by: INTERNAL MEDICINE

## 2024-10-31 PROCEDURE — C1769 GUIDE WIRE: HCPCS | Performed by: INTERNAL MEDICINE

## 2024-10-31 PROCEDURE — 74330 X-RAY BILE/PANC ENDOSCOPY: CPT

## 2024-10-31 RX ORDER — SODIUM CHLORIDE 9 MG/ML
INJECTION, SOLUTION INTRAVENOUS CONTINUOUS PRN
Status: DISCONTINUED | OUTPATIENT
Start: 2024-10-31 | End: 2024-10-31 | Stop reason: SURG

## 2024-10-31 RX ORDER — IPRATROPIUM BROMIDE AND ALBUTEROL SULFATE 2.5; .5 MG/3ML; MG/3ML
3 SOLUTION RESPIRATORY (INHALATION) ONCE AS NEEDED
Status: DISCONTINUED | OUTPATIENT
Start: 2024-10-31 | End: 2024-10-31 | Stop reason: HOSPADM

## 2024-10-31 RX ORDER — PROPOFOL 10 MG/ML
VIAL (ML) INTRAVENOUS AS NEEDED
Status: DISCONTINUED | OUTPATIENT
Start: 2024-10-31 | End: 2024-10-31 | Stop reason: SURG

## 2024-10-31 RX ORDER — LIDOCAINE HYDROCHLORIDE 10 MG/ML
INJECTION, SOLUTION EPIDURAL; INFILTRATION; INTRACAUDAL; PERINEURAL AS NEEDED
Status: DISCONTINUED | OUTPATIENT
Start: 2024-10-31 | End: 2024-10-31 | Stop reason: SURG

## 2024-10-31 RX ORDER — ESMOLOL HYDROCHLORIDE 10 MG/ML
INJECTION INTRAVENOUS AS NEEDED
Status: DISCONTINUED | OUTPATIENT
Start: 2024-10-31 | End: 2024-10-31 | Stop reason: SURG

## 2024-10-31 RX ORDER — ONDANSETRON 2 MG/ML
4 INJECTION INTRAMUSCULAR; INTRAVENOUS ONCE AS NEEDED
Status: DISCONTINUED | OUTPATIENT
Start: 2024-10-31 | End: 2024-10-31 | Stop reason: HOSPADM

## 2024-10-31 RX ADMIN — PROPOFOL INJECTABLE EMULSION 30 MG: 10 INJECTION, EMULSION INTRAVENOUS at 14:04

## 2024-10-31 RX ADMIN — SODIUM CHLORIDE: 9 INJECTION, SOLUTION INTRAVENOUS at 13:39

## 2024-10-31 RX ADMIN — LIDOCAINE HYDROCHLORIDE 100 MG: 10 INJECTION, SOLUTION EPIDURAL; INFILTRATION; INTRACAUDAL; PERINEURAL at 13:59

## 2024-10-31 RX ADMIN — PROPOFOL 100 MCG/KG/MIN: 10 INJECTION, EMULSION INTRAVENOUS at 13:47

## 2024-10-31 RX ADMIN — ESMOLOL HYDROCHLORIDE 30 MG: 10 INJECTION, SOLUTION INTRAVENOUS at 13:59

## 2024-10-31 NOTE — ANESTHESIA PREPROCEDURE EVALUATION
Anesthesia Evaluation     Patient summary reviewed and Nursing notes reviewed   NPO Solid Status: > 8 hours  NPO Liquid Status: > 2 hours           Airway   Mallampati: I  TM distance: >3 FB  Neck ROM: full  No difficulty expected  Dental    (+) edentulous, upper dentures and lower dentures    Pulmonary    (+) lung cancer (stable), COPD moderate,sleep apnea  (-) shortness of breath, recent URI, no home oxygen  Cardiovascular     ECG reviewed    (+) hypertension, CAD (mild), dysrhythmias (sp LAAO 2023) Atrial Fib, hyperlipidemia  (-) angina, cardiac stents    ROS comment: ECG A FIB rvr   ECHO Jan 2024 1/24 EF> 47%   ECHO  8/22 EF 50%, anatomically and functionally nor    CAD Nuclear MPS 2022:small-sized, mildly severe area of ischemia located in the apex.  LVEF 56%  [facility] 2022 - mild CAD      Neuro/Psych  (-) seizures, no Parkinson's disease  GI/Hepatic/Renal/Endo    (+) obesity, GERD, renal disease- CRI, diabetes mellitus type 2  (-) morbid obesity, no thyroid disorder    Musculoskeletal     Abdominal    Substance History      OB/GYN          Other      history of cancer        Phys Exam Other: Jj 2 G 1V Yao                   Anesthesia Plan    ASA 3     general and MAC     intravenous induction     Anesthetic plan, risks, benefits, and alternatives have been provided, discussed and informed consent has been obtained with: patient.    Plan discussed with CRNA.        CODE STATUS:

## 2024-10-31 NOTE — ANESTHESIA POSTPROCEDURE EVALUATION
Patient: Darien Camarena    Procedure Summary       Date: 10/31/24 Room / [facility]:  [facility]    Anesthesia Start: 1339 Anesthesia Stop: 1422    Procedures:       ENDOSCOPIC RETROGRADE CHOLANGIOPANCREATOGRAPHY      ESOPHAGOGASTRODUODENOSCOPY WITH BIOPSY Diagnosis:       Esophagitis      Encounter for removal of biliary stent      (Esophagitis [K20.90])      (Encounter for removal of biliary stent [Z46.89])    Surgeons: Carlos Dior MD Provider: Jam Heck MD    Anesthesia Type: general, MAC ASA Status: 3            Anesthesia Type: general, MAC    Vitals  Vitals Value Taken Time   /90 10/31/24 1420   Temp     Pulse 97 10/31/24 1422   Resp     SpO2 98 % 10/31/24 1422   Vitals shown include unfiled device data.        Post Anesthesia Care and Evaluation    Patient location during evaluation: PACU  Patient participation: complete - patient participated  Level of consciousness: awake and alert  Pain management: adequate    Airway patency: patent  Anesthetic complications: No anesthetic complications  PONV Status: none  Cardiovascular status: hemodynamically stable and acceptable  Respiratory status: nonlabored ventilation, acceptable and nasal cannula  Hydration status: acceptable    Comments: 97.1  rr15

## 2024-10-31 NOTE — H&P
GASTROENTEROLOGY OFFICE NOTE  Darien Camarena  [id number]  1935      CHIEF COMPLAINT  Follow-up of erosive esophagitis  Biliary stent removal    HISTORY OF PRESENT ILLNESS:  First visit to me for this 89-year-old white male status post ERCP and EGD in September by Dr. Bello Arambula.  He had severe erosive esophagitis and presents to confirm resolution of this esophagitis currently taking omeprazole 20 mg p.o. twice daily.  He also underwent ERCP with biliary sphincterotomy and stent placement as well as common duct stone extraction.  Purulent bile was noted.  He presents without fevers, chills, dysphagia, odynophagia, early satiety, unexplained weight loss, melena or bright red blood per rectum.  He presents without fevers, chills, dysphagia, odynophagia, early satiety, unexplained weight loss, melena or bright red blood per rectum.    PAST MEDICAL HISTORY  Past Medical History:    Arrhythmia    Atrial fibrillation    Chronic kidney disease    COPD (chronic obstructive pulmonary disease)    Coronary artery disease    Diabetes mellitus    doesnt check sugar    E. coli sepsis    Enlarged prostate without lower urinary tract symptoms (luts)    Full dentures    GERD (gastroesophageal reflux disease)    Gout    Hearing aid worn    bilat prn    History of colonoscopy    History of radiation therapy    SBRT LLL lung    Alutiiq (hard of hearing)    hearing aids prn    Hyperlipidemia    Hypertension    Kidney stone    surgery x1    Lung cancer    STEVEN on CPAP    compliant with machine    PAF (paroxysmal atrial fibrillation)    Prostatism    Urinary incontinence    Urinary tract infection    Wears glasses    readers        PAST SURGICAL HISTORY  Past Surgical History:    ATRIAL APPENDAGE EXCLUSION LEFT WITH TRANSESOPHAGEAL ECHOCARDIOGRAM    Procedure: Atrial Appendage Occlusion;  Surgeon: Anthony Mcdaniel MD;  Location: [facility] INVASIVE LOCATION;  Service: Cardiovascular;  Laterality: N/A;    CARDIAC CATHETERIZATION     Procedure: Left Heart Cath;  Surgeon: Titus Oliveros IV, MD;  Location:  [facility];  Service: Cardiovascular;  Laterality: Left;    CARDIOVERSION    CATARACT EXTRACTION    COLONOSCOPY    CYSTOSCOPY    ENDOSCOPY    possible    ENDOSCOPY    Procedure: ESOPHAGOGASTRODUODENOSCOPY;  Surgeon: Anthony Arambula MD;  Location:  [facility];  Service: Gastroenterology;  Laterality: N/A;  Esophagus dilated to 18mm with 12mm-mm to 15mm, then 15mm to 18mm balloon.    ERCP    Procedure: ENDOSCOPIC RETROGRADE CHOLANGIOPANCREATOGRAPHY;  Surgeon: Anthony Arambula MD;  Location:  [facility];  Service: Gastroenterology;  Laterality: N/A;  Sphincterotomy made to ampula of common bile duct (CBD), CBD swept with 9mm-12mm balloon - sludge, stones and purulent appearing drainage extracted. 10x7 Mongolian biliary stent depolyed into CBD. ERCP scope removed with balloon intact.    KIDNEY STONE SURGERY    x1        MEDICATIONS:  Coenzyme Q10, Iron, Probiotic Product, albuterol sulfate HFA, allopurinol, ascorbic acid, aspirin, docusate sodium, donepezil, finasteride, furosemide, memantine, metFORMIN, methenamine, metoprolol tartrate, multivitamin with minerals, omeprazole, potassium chloride, pravastatin, sertraline, tamsulosin, and tiotropium bromide-olodaterol     ALLERGIES  is allergic to xarelto [rivaroxaban], sglt2 inhibitors, and penicillins.    FAMILY HISTORY:  Cancer-related family history includes Cancer in his father; Lung cancer in his father.  Colon Cancer-related family history is not on file.    SOCIAL HISTORY  He  reports that he quit smoking about 64 years ago. His smoking use included cigarettes. He started smoking about 77 years ago. He has a 15 pack-year smoking history. He has been exposed to tobacco smoke. He quit smokeless tobacco use about 13 years ago.  His smokeless tobacco use included chew. He reports that he does not drink alcohol and does not use drugs.   He is .  He has 3  daughters.  He is retired from [employer].    REVIEW OF SYSTEMS  Cardiovascular, pulmonary and generalized review of systems are pertinent as reviewed above    PHYSICAL EXAM   BP (!) 145/112 (BP Location: Left arm, Patient Position: Lying)   Pulse 105   Temp 97.2 °F (36.2 °C) (Temporal)   Resp 18   SpO2 95%   General: Alert and oriented x 3. In no apparent or acute distress.  and No stigmata of chronic liver disease  HEENT: Anicteric sclerae. Normal oropharynx  Neck: Supple. Without lymphadenopathy  CV: Regular rate and rhythm, S1, S2  Lungs: Clear to ausculation. Without rales, rhonchi and wheezing  Abdomen:  Soft,non-distended without palpable masses or hepatosplenomeagaly, areas of rebound tenderness or guarding.   Extremeties: without clubbing, cyanosis or edema  Neurologic:  Alert and oriented x 3 without focal motor or sensory deficits  Rectal exam: deferred       ASSESSMENT  1.-Follow-up of severe erosive esophagitis  2.-History of choledocholithiasis with currently indwelling biliary stent patient presents for removal and final clearance of the common bile duct    PLAN  1.-EGD for assessment of erosive esophagitis  2.-ERCP for biliary stent removal and clearance of the common bile duct.  3.-Further recommendation deferred pending findings of today's endoscopic studies      Carlos Dior MD  10/31/2024   13:39 EDT      ADDENDUM  EGD: Complete resolution of previously noted erosive esophagitis  ERCP: Biliary stent removed.  Stone/sludge extracted from the common bile duct.  CBD clear of stones or sludge.  Patent cystic duct noted

## 2024-11-01 ENCOUNTER — HOSPITAL ENCOUNTER (EMERGENCY)
Facility: HOSPITAL | Age: 89
Discharge: HOME | End: 2024-11-02
Attending: EMERGENCY MEDICINE
Payer: MEDICARE

## 2024-11-01 DIAGNOSIS — R60.0 PEDAL EDEMA: ICD-10-CM

## 2024-11-01 DIAGNOSIS — R04.0 ACUTE ANTERIOR EPISTAXIS: Primary | ICD-10-CM

## 2024-11-01 LAB
ALBUMIN SERPL-MCNC: 3.3 G/DL (ref 3.5–5.2)
ALBUMIN/GLOB SERPL: 1 G/DL
ALP SERPL-CCNC: 201 U/L (ref 39–117)
ALT SERPL W P-5'-P-CCNC: 17 U/L (ref 1–41)
ANION GAP SERPL CALCULATED.3IONS-SCNC: 8 MMOL/L (ref 5–15)
AST SERPL-CCNC: 29 U/L (ref 1–40)
BASOPHILS # BLD AUTO: 0.07 10*3/MM3 (ref 0–0.2)
BASOPHILS NFR BLD AUTO: 1.2 % (ref 0–1.5)
BILIRUB SERPL-MCNC: 1 MG/DL (ref 0–1.2)
BUN SERPL-MCNC: 20 MG/DL (ref 8–23)
BUN/CREAT SERPL: 22.5 (ref 7–25)
CALCIUM SPEC-SCNC: 8.5 MG/DL (ref 8.6–10.5)
CHLORIDE SERPL-SCNC: 102 MMOL/L (ref 98–107)
CO2 SERPL-SCNC: 29 MMOL/L (ref 22–29)
CREAT SERPL-MCNC: 0.89 MG/DL (ref 0.76–1.27)
DEPRECATED RDW RBC AUTO: 58.8 FL (ref 37–54)
EGFRCR SERPLBLD CKD-EPI 2021: 82.4 ML/MIN/1.73
EOSINOPHIL # BLD AUTO: 0.32 10*3/MM3 (ref 0–0.4)
EOSINOPHIL NFR BLD AUTO: 5.3 % (ref 0.3–6.2)
ERYTHROCYTE [DISTWIDTH] IN BLOOD BY AUTOMATED COUNT: 16.5 % (ref 12.3–15.4)
GLOBULIN UR ELPH-MCNC: 3.3 GM/DL
GLUCOSE SERPL-MCNC: 99 MG/DL (ref 65–99)
HCT VFR BLD AUTO: 37.6 % (ref 37.5–51)
HGB BLD-MCNC: 12.2 G/DL (ref 13–17.7)
IMM GRANULOCYTES # BLD AUTO: 0.01 10*3/MM3 (ref 0–0.05)
IMM GRANULOCYTES NFR BLD AUTO: 0.2 % (ref 0–0.5)
LYMPHOCYTES # BLD AUTO: 1.44 10*3/MM3 (ref 0.7–3.1)
LYMPHOCYTES NFR BLD AUTO: 23.8 % (ref 19.6–45.3)
MCH RBC QN AUTO: 31.9 PG (ref 26.6–33)
MCHC RBC AUTO-ENTMCNC: 32.4 G/DL (ref 31.5–35.7)
MCV RBC AUTO: 98.2 FL (ref 79–97)
MONOCYTES # BLD AUTO: 0.39 10*3/MM3 (ref 0.1–0.9)
MONOCYTES NFR BLD AUTO: 6.5 % (ref 5–12)
NEUTROPHILS NFR BLD AUTO: 3.81 10*3/MM3 (ref 1.7–7)
NEUTROPHILS NFR BLD AUTO: 63 % (ref 42.7–76)
NRBC BLD AUTO-RTO: 0 /100 WBC (ref 0–0.2)
PLATELET # BLD AUTO: 168 10*3/MM3 (ref 140–450)
PMV BLD AUTO: 10.5 FL (ref 6–12)
POTASSIUM SERPL-SCNC: 3.9 MMOL/L (ref 3.5–5.2)
PROT SERPL-MCNC: 6.6 G/DL (ref 6–8.5)
RBC # BLD AUTO: 3.83 10*6/MM3 (ref 4.14–5.8)
SODIUM SERPL-SCNC: 139 MMOL/L (ref 136–145)
WBC NRBC COR # BLD AUTO: 6.04 10*3/MM3 (ref 3.4–10.8)

## 2024-11-01 PROCEDURE — 80053 COMPREHEN METABOLIC PANEL: CPT | Performed by: EMERGENCY MEDICINE

## 2024-11-01 PROCEDURE — 99283 EMERGENCY DEPT VISIT LOW MDM: CPT

## 2024-11-01 PROCEDURE — C9046 COCAINE HCL NASAL SOLUTION: HCPCS | Performed by: EMERGENCY MEDICINE

## 2024-11-01 PROCEDURE — 36415 COLL VENOUS BLD VENIPUNCTURE: CPT

## 2024-11-01 PROCEDURE — 25010000002 COCAINE HCL 40 MG/ML SOLUTION: Performed by: EMERGENCY MEDICINE

## 2024-11-01 PROCEDURE — 85025 COMPLETE CBC W/AUTO DIFF WBC: CPT | Performed by: EMERGENCY MEDICINE

## 2024-11-01 RX ORDER — COCAINE HYDROCHLORIDE 40 MG/ML
SOLUTION NASAL ONCE
Status: COMPLETED | OUTPATIENT
Start: 2024-11-01 | End: 2024-11-01

## 2024-11-01 RX ORDER — TRANEXAMIC ACID 100 MG/ML
500 INJECTION, SOLUTION INTRAVENOUS ONCE
Status: COMPLETED | OUTPATIENT
Start: 2024-11-01 | End: 2024-11-01

## 2024-11-01 RX ADMIN — TRANEXAMIC ACID 500 MG: 100 INJECTION, SOLUTION INTRAVENOUS at 22:59

## 2024-11-01 RX ADMIN — COCAINE HYDROCHLORIDE NASAL 160 MG: 40 SOLUTION TOPICAL at 22:58

## 2024-11-02 VITALS
HEART RATE: 106 BPM | OXYGEN SATURATION: 95 % | RESPIRATION RATE: 18 BRPM | WEIGHT: 245 LBS | SYSTOLIC BLOOD PRESSURE: 123 MMHG | TEMPERATURE: 97.4 F | DIASTOLIC BLOOD PRESSURE: 86 MMHG | BODY MASS INDEX: 30.46 KG/M2 | HEIGHT: 75 IN

## 2024-11-02 RX ORDER — CEPHALEXIN 500 MG/1
500 CAPSULE ORAL 2 TIMES DAILY
Qty: 10 CAPSULE | Refills: 0 | Status: SHIPPED | OUTPATIENT
Start: 2024-11-02

## 2024-11-02 NOTE — ED PROVIDER NOTES
Subjective   History of Present Illness    Pt presents with a nosebleed that started about an hour ago and has nearly abated.  This is the second nosebleed this week.  He denies trauma or nosepicking.  He is not anticoagulated.    Family is also worried about BLE edema, a chronic problem that has worsened over the last week.    History provided by:  Patient and relative      Review of Systems    Past Medical History:   Diagnosis Date    Arrhythmia     Atrial fibrillation     Chronic kidney disease     COPD (chronic obstructive pulmonary disease)     Coronary artery disease     Diabetes mellitus     doesnt check sugar    E. coli sepsis 06/23/2022    Enlarged prostate without lower urinary tract symptoms (luts) 06/20/2016    Full dentures     GERD (gastroesophageal reflux disease)     Gout     Hearing aid worn     bilat prn    History of colonoscopy 09/12/2012    History of radiation therapy 02/24/2023    SBRT LLL lung    [place] (hard of hearing)     hearing aids prn    Hyperlipidemia     Hypertension     Kidney stone     surgery x1    Lung cancer     STEVEN on CPAP     compliant with machine    PAF (paroxysmal atrial fibrillation)     Prostatism     Urinary incontinence     Urinary tract infection     Wears glasses     readers       Allergies   Allergen Reactions    Xarelto [Rivaroxaban] GI Bleeding     GI bleed    Sglt2 Inhibitors Other (See Comments)     Recurrent UTI    Penicillins Hives     Has tolerated cefepime, ceftriaxone, cefazolin, cefdinir, cefuroxime, cephalexin       Past Surgical History:   Procedure Laterality Date    ATRIAL APPENDAGE EXCLUSION LEFT WITH TRANSESOPHAGEAL ECHOCARDIOGRAM N/A 11/28/2023    Procedure: Atrial Appendage Occlusion;  Surgeon: Anthony Mcdaniel MD;  Location: [facility] INVASIVE LOCATION;  Service: Cardiovascular;  Laterality: N/A;    CARDIAC CATHETERIZATION Left 09/29/2022    Procedure: Left Heart Cath;  Surgeon: Titus Oliveros IV, MD;  Location: [facility] CATH INVASIVE LOCATION;   Service: Cardiovascular;  Laterality: Left;    CARDIOVERSION      CATARACT EXTRACTION Bilateral     COLONOSCOPY      CYSTOSCOPY      ENDOSCOPY      possible    ENDOSCOPY N/A 2024    Procedure: ESOPHAGOGASTRODUODENOSCOPY;  Surgeon: Anthony Arambula MD;  Location:  [facility];  Service: Gastroenterology;  Laterality: N/A;  Esophagus dilated to 18mm with 12mm-mm to 15mm, then 15mm to 18mm balloon.    ENDOSCOPY N/A 10/31/2024    Procedure: ESOPHAGOGASTRODUODENOSCOPY WITH BIOPSY;  Surgeon: Carlos Dior MD;  Location:  [facility];  Service: Gastroenterology;  Laterality: N/A;    ERCP N/A 2024    Procedure: ENDOSCOPIC RETROGRADE CHOLANGIOPANCREATOGRAPHY;  Surgeon: Anthony Arambula MD;  Location:  [facility];  Service: Gastroenterology;  Laterality: N/A;  Sphincterotomy made to ampula of common bile duct (CBD), CBD swept with 9mm-12mm balloon - sludge, stones and purulent appearing drainage extracted. 10x7 [place] biliary stent depolyed into CBD. ERCP scope removed with balloon intact.    ERCP N/A 10/31/2024    Procedure: ENDOSCOPIC RETROGRADE CHOLANGIOPANCREATOGRAPHY;  Surgeon: Carlos Dior MD;  Location:  [facility];  Service: Gastroenterology;  Laterality: N/A;    KIDNEY STONE SURGERY      x1       Family History   Problem Relation Age of Onset    Alzheimer's disease Mother     COPD Father     Lung cancer Father     Cancer Father     Kidney disease Father     No Known Problems Daughter     No Known Problems Daughter     No Known Problems Daughter        Social History     Socioeconomic History    Marital status:    Tobacco Use    Smoking status: Former     Current packs/day: 0.00     Average packs/day: 1 pack/day for 15.0 years (15.0 ttl pk-yrs)     Types: Cigarettes     Start date: 1947     Quit date: 1960     Years since quittin.5     Passive exposure: Past    Smokeless tobacco: Former     Types: Chew     Quit date:     Tobacco comments:      started at 13 yo.   Vaping Use    Vaping status: Never Used    Passive vaping exposure: Yes   Substance and Sexual Activity    Alcohol use: No    Drug use: Never    Sexual activity: Defer     Partners: Female           Objective   Physical Exam  Vitals and nursing note reviewed.   Constitutional:       General: He is not in acute distress.     Appearance: Normal appearance. He is not ill-appearing.   HENT:      Head: Normocephalic and atraumatic.   Eyes:      General: No scleral icterus.        Right eye: No discharge.         Left eye: No discharge.      Conjunctiva/sclera: Conjunctivae normal.   Cardiovascular:      Rate and Rhythm: Normal rate.   Pulmonary:      Effort: Pulmonary effort is normal. No respiratory distress.   Musculoskeletal:         General: No swelling or deformity.      Cervical back: Normal range of motion and neck supple.      Right lower leg: Edema present.      Left lower leg: Edema present.      Comments: The dorsum of the left foot has a very superficial open wound c/w de-roofed blister. No sign of infection currently.   Skin:     General: Skin is dry.      Findings: No rash.   Neurological:      General: No focal deficit present.      Mental Status: He is alert and oriented to person, place, and time. Mental status is at baseline.   Psychiatric:         Mood and Affect: Mood normal.         Behavior: Behavior normal.         Thought Content: Thought content normal.         Epistaxis Management    Date/Time: 11/2/2024 1:17 AM    Performed by: Oneal Beckwith MD  Authorized by: Oneal Beckwith MD    Consent:     Consent obtained:  Verbal    Consent given by:  Patient    Risks discussed:  Pain    Alternatives discussed:  Alternative treatment  Universal protocol:     Procedure explained and questions answered to patient or proxy's satisfaction: yes      Patient identity confirmed:  Verbally with patient  Anesthesia:     Anesthesia method:  Topical application    Topical  anesthetic:  Cocaine  Procedure details:     Treatment site:  L anterior    Treatment method:  Nasal balloon and silver nitrate (TXA)    Treatment complexity:  Extensive    Treatment episode: initial    Post-procedure details:     Assessment:  Bleeding stopped    Procedure completion:  Tolerated well, no immediate complications  Comments:      Patient blew nose thoroughly.  Cocaine applied, direct pressure for 20 minutes.  Removed, TXA applied, direct pressure for 30 minutes.  Removed, silver nitrate application was unsuccessful.  20 min more direct pressure, still some bleeding, Rapid Rhino placed and inflated.  Hemostasis achieved.             ED Course    I reviewed outside records.  Discharge summary 9/8/24 after admission for choledocholithiasis.  He underwent ERCP with stent placement and stone extraction and he was treated for cholangitis.  GI H&P 10/31/24 visit for EGD/ERCP with stent removal.  Echo 1/27/24 EF 46-50%.  Cardiology note 10/29/24 notes AF, heart failure with increase in Lasix from 20 to 40.      Labs benign.      Nosebleed treated per procedure note.  Unsuccessful until packing and then watched and no recurrence.      Patient stable on serial rechecks.  Discussed findings, concerns, plan of care, expected course, reasons to return and followup.  Provided the opportunity to ask questions.    For the edema recommend continue lasix and use compression stockings and ambulate/move legs to mobilize fluid.      For nosebleed ENT followup closely.                                           Medical Decision Making  Problems Addressed:  Acute anterior epistaxis: complicated acute illness or injury  Pedal edema: complicated acute illness or injury    Amount and/or Complexity of Data Reviewed  Independent Historian: caregiver  External Data Reviewed: notes.  Labs: ordered. Decision-making details documented in ED Course.    Risk  Prescription drug management.  Decision regarding  hospitalization.        Final diagnoses:   Acute anterior epistaxis   Pedal edema       ED Disposition  ED Disposition       ED Disposition   Discharge    Condition   Stable    Comment   --               Pito Way MD  [address] [facility] [address]  [place] [id number]  [phone]          Dani Bueno MD  [address] [facility] [address]  [place] [address]  [phone]    Call in 2 days           Medication List        New Prescriptions      cephalexin 500 MG capsule  Commonly known as: KEFLEX  Take 1 capsule by mouth 2 (Two) Times a Day.               Where to Get Your Medications        These medications were sent to [facility] [address] [place] [address] - [address] [facility] - [phone]  - [phone]   [address] [place] [id number]      Phone: [phone]   cephalexin 500 MG capsule            Oneal Beckwith MD  11/02/24 0114

## 2024-11-06 ENCOUNTER — TELEPHONE (OUTPATIENT)
Dept: INTERNAL MEDICINE | Facility: CLINIC | Age: 89
End: 2024-11-06
Payer: MEDICARE

## 2024-11-06 ENCOUNTER — HOSPITAL ENCOUNTER (EMERGENCY)
Facility: HOSPITAL | Age: 89
Discharge: HOME | End: 2024-11-06
Attending: EMERGENCY MEDICINE
Payer: MEDICARE

## 2024-11-06 ENCOUNTER — APPOINTMENT (OUTPATIENT)
Facility: HOSPITAL | Age: 89
End: 2024-11-06
Payer: MEDICARE

## 2024-11-06 VITALS
HEIGHT: 75 IN | BODY MASS INDEX: 29.47 KG/M2 | WEIGHT: 237 LBS | OXYGEN SATURATION: 94 % | RESPIRATION RATE: 16 BRPM | DIASTOLIC BLOOD PRESSURE: 93 MMHG | HEART RATE: 115 BPM | TEMPERATURE: 98 F | SYSTOLIC BLOOD PRESSURE: 128 MMHG

## 2024-11-06 DIAGNOSIS — R04.0 EPISTAXIS: Primary | ICD-10-CM

## 2024-11-06 LAB
ALBUMIN SERPL-MCNC: 3 G/DL (ref 3.5–5.2)
ALBUMIN/GLOB SERPL: 1.1 G/DL
ALP SERPL-CCNC: 170 U/L (ref 39–117)
ALT SERPL W P-5'-P-CCNC: 12 U/L (ref 1–41)
ANION GAP SERPL CALCULATED.3IONS-SCNC: 6.2 MMOL/L (ref 5–15)
APTT PPP: 31.5 SECONDS (ref 22–39)
AST SERPL-CCNC: 31 U/L (ref 1–40)
BASOPHILS # BLD AUTO: 0.04 10*3/MM3 (ref 0–0.2)
BASOPHILS NFR BLD AUTO: 0.5 % (ref 0–1.5)
BILIRUB SERPL-MCNC: 0.9 MG/DL (ref 0–1.2)
BUN SERPL-MCNC: 22 MG/DL (ref 8–23)
BUN/CREAT SERPL: 31.4 (ref 7–25)
CALCIUM SPEC-SCNC: 8.3 MG/DL (ref 8.6–10.5)
CHLORIDE SERPL-SCNC: 101 MMOL/L (ref 98–107)
CO2 SERPL-SCNC: 29.8 MMOL/L (ref 22–29)
CREAT SERPL-MCNC: 0.7 MG/DL (ref 0.76–1.27)
DEPRECATED RDW RBC AUTO: 61.5 FL (ref 37–54)
EGFRCR SERPLBLD CKD-EPI 2021: 88.6 ML/MIN/1.73
EOSINOPHIL # BLD AUTO: 0.38 10*3/MM3 (ref 0–0.4)
EOSINOPHIL NFR BLD AUTO: 4.9 % (ref 0.3–6.2)
ERYTHROCYTE [DISTWIDTH] IN BLOOD BY AUTOMATED COUNT: 16.6 % (ref 12.3–15.4)
GLOBULIN UR ELPH-MCNC: 2.8 GM/DL
GLUCOSE SERPL-MCNC: 111 MG/DL (ref 65–99)
HCT VFR BLD AUTO: 31.1 % (ref 37.5–51)
HGB BLD-MCNC: 10 G/DL (ref 13–17.7)
IMM GRANULOCYTES # BLD AUTO: 0.02 10*3/MM3 (ref 0–0.05)
IMM GRANULOCYTES NFR BLD AUTO: 0.3 % (ref 0–0.5)
INR PPP: 1.38 (ref 0.89–1.12)
LYMPHOCYTES # BLD AUTO: 1.31 10*3/MM3 (ref 0.7–3.1)
LYMPHOCYTES NFR BLD AUTO: 17 % (ref 19.6–45.3)
MCH RBC QN AUTO: 32.4 PG (ref 26.6–33)
MCHC RBC AUTO-ENTMCNC: 32.2 G/DL (ref 31.5–35.7)
MCV RBC AUTO: 100.6 FL (ref 79–97)
MONOCYTES # BLD AUTO: 0.63 10*3/MM3 (ref 0.1–0.9)
MONOCYTES NFR BLD AUTO: 8.2 % (ref 5–12)
NEUTROPHILS NFR BLD AUTO: 5.34 10*3/MM3 (ref 1.7–7)
NEUTROPHILS NFR BLD AUTO: 69.1 % (ref 42.7–76)
PLATELET # BLD AUTO: 172 10*3/MM3 (ref 140–450)
PMV BLD AUTO: 10.6 FL (ref 6–12)
POTASSIUM SERPL-SCNC: 3.6 MMOL/L (ref 3.5–5.2)
PROT SERPL-MCNC: 5.8 G/DL (ref 6–8.5)
PROTHROMBIN TIME: 17.6 SECONDS (ref 12.2–14.5)
RBC # BLD AUTO: 3.09 10*6/MM3 (ref 4.14–5.8)
SODIUM SERPL-SCNC: 137 MMOL/L (ref 136–145)
WBC NRBC COR # BLD AUTO: 7.72 10*3/MM3 (ref 3.4–10.8)

## 2024-11-06 PROCEDURE — 85610 PROTHROMBIN TIME: CPT | Performed by: EMERGENCY MEDICINE

## 2024-11-06 PROCEDURE — 85025 COMPLETE CBC W/AUTO DIFF WBC: CPT | Performed by: EMERGENCY MEDICINE

## 2024-11-06 PROCEDURE — 71045 X-RAY EXAM CHEST 1 VIEW: CPT

## 2024-11-06 PROCEDURE — 85730 THROMBOPLASTIN TIME PARTIAL: CPT | Performed by: EMERGENCY MEDICINE

## 2024-11-06 PROCEDURE — 99283 EMERGENCY DEPT VISIT LOW MDM: CPT

## 2024-11-06 PROCEDURE — 36415 COLL VENOUS BLD VENIPUNCTURE: CPT

## 2024-11-06 PROCEDURE — 80053 COMPREHEN METABOLIC PANEL: CPT | Performed by: EMERGENCY MEDICINE

## 2024-11-06 NOTE — FSED PROVIDER NOTE
Subjective  History of Present Illness:    Patient presents the emergency department with epistaxis.  Patient reports that this started a couple hours prior to arrival patient presents by EMS EMS placed a TXA soaked gauze in the left nare with some improvement.  The patient was seen on left first and a Rhino Rocket was placed patient reports that this fell out 2 days later but the patient did not have bleeding initially.      Nurses Notes reviewed and agree, including vitals, allergies, social history and prior medical history.     REVIEW OF SYSTEMS:   Review of Systems   HENT:  Positive for nosebleeds.        Past Medical History:   Diagnosis Date    Arrhythmia     Atrial fibrillation     Chronic kidney disease     COPD (chronic obstructive pulmonary disease)     Coronary artery disease     Diabetes mellitus     doesnt check sugar    E. coli sepsis 06/23/2022    Enlarged prostate without lower urinary tract symptoms (luts) 06/20/2016    Full dentures     GERD (gastroesophageal reflux disease)     Gout     Hearing aid worn     bilat prn    History of colonoscopy 09/12/2012    History of radiation therapy 02/24/2023    SBRT LLL lung    [place] (hard of hearing)     hearing aids prn    Hyperlipidemia     Hypertension     Kidney stone     surgery x1    Lung cancer     STEVEN on CPAP     compliant with machine    PAF (paroxysmal atrial fibrillation)     Prostatism     Urinary incontinence     Urinary tract infection     Wears glasses     readers       Allergies:    Xarelto [rivaroxaban], Sglt2 inhibitors, and Penicillins      Past Surgical History:   Procedure Laterality Date    ATRIAL APPENDAGE EXCLUSION LEFT WITH TRANSESOPHAGEAL ECHOCARDIOGRAM N/A 11/28/2023    Procedure: Atrial Appendage Occlusion;  Surgeon: Anthony Mcdaniel MD;  Location: [facility] INVASIVE LOCATION;  Service: Cardiovascular;  Laterality: N/A;    CARDIAC CATHETERIZATION Left 09/29/2022    Procedure: Left Heart Cath;  Surgeon: Titus Oliveros IV, MD;   Location: [facility] CATH INVASIVE LOCATION;  Service: Cardiovascular;  Laterality: Left;    CARDIOVERSION      CATARACT EXTRACTION Bilateral     COLONOSCOPY      CYSTOSCOPY      ENDOSCOPY      possible    ENDOSCOPY N/A 2024    Procedure: ESOPHAGOGASTRODUODENOSCOPY;  Surgeon: Anthony Arambula MD;  Location:  [facility];  Service: Gastroenterology;  Laterality: N/A;  Esophagus dilated to 18mm with 12mm-mm to 15mm, then 15mm to 18mm balloon.    ENDOSCOPY N/A 10/31/2024    Procedure: ESOPHAGOGASTRODUODENOSCOPY WITH BIOPSY;  Surgeon: Carlos Dior MD;  Location:  [facility];  Service: Gastroenterology;  Laterality: N/A;    ERCP N/A 2024    Procedure: ENDOSCOPIC RETROGRADE CHOLANGIOPANCREATOGRAPHY;  Surgeon: Anthony Arambula MD;  Location:  [facility];  Service: Gastroenterology;  Laterality: N/A;  Sphincterotomy made to ampula of common bile duct (CBD), CBD swept with 9mm-12mm balloon - sludge, stones and purulent appearing drainage extracted. 10x7 Danish biliary stent depolyed into CBD. ERCP scope removed with balloon intact.    ERCP N/A 10/31/2024    Procedure: ENDOSCOPIC RETROGRADE CHOLANGIOPANCREATOGRAPHY;  Surgeon: Carlos Dior MD;  Location:  [facility];  Service: Gastroenterology;  Laterality: N/A;    KIDNEY STONE SURGERY      x1         Social History     Socioeconomic History    Marital status:    Tobacco Use    Smoking status: Former     Current packs/day: 0.00     Average packs/day: 1 pack/day for 15.0 years (15.0 ttl pk-yrs)     Types: Cigarettes     Start date: 1947     Quit date: 1960     Years since quittin.5     Passive exposure: Past    Smokeless tobacco: Former     Types: Chew     Quit date:     Tobacco comments:     started at 15 yo.   Vaping Use    Vaping status: Never Used    Passive vaping exposure: Yes   Substance and Sexual Activity    Alcohol use: No    Drug use: Never    Sexual activity: Defer     Partners: Female  "        Family History   Problem Relation Age of Onset    Alzheimer's disease Mother     COPD Father     Lung cancer Father     Cancer Father     Kidney disease Father     No Known Problems Daughter     No Known Problems Daughter     No Known Problems Daughter        Objective  Physical Exam:  /93   Pulse 115   Temp 98 °F (36.7 °C) (Oral)   Resp 16   Ht 190.5 cm (75\")   Wt 108 kg (237 lb)   SpO2 94%   BMI 29.62 kg/m²      Physical Exam  Vitals and nursing note reviewed.   Constitutional:       Appearance: He is well-developed. He is obese.   HENT:      Nose:      Left Nostril: Epistaxis present.      Left Turbinates: Swollen.   Eyes:      Extraocular Movements: Extraocular movements intact.      Pupils: Pupils are equal, round, and reactive to light.   Cardiovascular:      Rate and Rhythm: Normal rate and regular rhythm.      Heart sounds: Normal heart sounds.      No friction rub.   Pulmonary:      Effort: Pulmonary effort is normal. No respiratory distress.      Breath sounds: Normal breath sounds.      Comments: Course breath sounds  Abdominal:      General: Bowel sounds are normal.      Palpations: Abdomen is soft.   Skin:     General: Skin is warm and dry.   Neurological:      General: No focal deficit present.      Mental Status: He is alert.      Cranial Nerves: No cranial nerve deficit.   Psychiatric:         Mood and Affect: Mood normal.         Behavior: Behavior normal.         Epistaxis Management    Date/Time: 11/6/2024 5:24 AM    Performed by: Joaquim Zhang MD  Authorized by: Joaquim Zhang MD    Consent:     Consent obtained:  Verbal    Risks discussed:  Bleeding  [place] protocol:     Patient identity confirmed:  Verbally with patient  Procedure details:     Treatment site:  L anterior    Treatment method:  Anterior pack  Post-procedure details:     Assessment:  Bleeding decreased    Procedure completion:  Tolerated well, no immediate complications      ED Course:         Lab Results " (last 24 hours)       Procedure Component Value Units Date/Time    CBC & Differential [[id number]]  (Abnormal) Collected: 11/06/24 0400    Specimen: Blood Updated: 11/06/24 0404    Narrative:      The following orders were created for panel order CBC & Differential.  Procedure                               Abnormality         Status                     ---------                               -----------         ------                     CBC Auto Differential[[id number]]        Abnormal            Final result                 Please view results for these tests on the individual orders.    Comprehensive Metabolic Panel [[id number]]  (Abnormal) Collected: 11/06/24 0400    Specimen: Blood Updated: 11/06/24 0420     Glucose 111 mg/dL      BUN 22 mg/dL      Creatinine 0.70 mg/dL      Sodium 137 mmol/L      Potassium 3.6 mmol/L      Chloride 101 mmol/L      CO2 29.8 mmol/L      Calcium 8.3 mg/dL      Total Protein 5.8 g/dL      Albumin 3.0 g/dL      ALT (SGPT) 12 U/L      AST (SGOT) 31 U/L      Alkaline Phosphatase 170 U/L      Total Bilirubin 0.9 mg/dL      Globulin 2.8 gm/dL      A/G Ratio 1.1 g/dL      BUN/Creatinine Ratio 31.4     Anion Gap 6.2 mmol/L      eGFR 88.6 mL/min/1.73     Narrative:      GFR Normal >60  Chronic Kidney Disease <60  Kidney Failure <15    The GFR formula is only valid for adults with stable renal function between ages 18 and 70.    aPTT [[id number]]  (Normal) Collected: 11/06/24 0400    Specimen: Blood Updated: 11/06/24 0413     PTT 31.5 seconds     Narrative:      PTT = The equivalent PTT values for the therapeutic range of heparin levels at 0.3 to 0.5 U/ml are 60 to 70 seconds.    Protime-INR [[id number]]  (Abnormal) Collected: 11/06/24 0400    Specimen: Blood Updated: 11/06/24 0413     Protime 17.6 Seconds      INR 1.38    CBC Auto Differential [[id number]]  (Abnormal) Collected: 11/06/24 0400    Specimen: Blood Updated: 11/06/24 0404     WBC 7.72 10*3/mm3      RBC 3.09 10*6/mm3      Hemoglobin  10.0 g/dL      Hematocrit 31.1 %      .6 fL      MCH 32.4 pg      MCHC 32.2 g/dL      RDW 16.6 %      RDW-SD 61.5 fl      MPV 10.6 fL      Platelets 172 10*3/mm3      Neutrophil % 69.1 %      Lymphocyte % 17.0 %      Monocyte % 8.2 %      Eosinophil % 4.9 %      Basophil % 0.5 %      Immature Grans % 0.3 %      Neutrophils, Absolute 5.34 10*3/mm3      Lymphocytes, Absolute 1.31 10*3/mm3      Monocytes, Absolute 0.63 10*3/mm3      Eosinophils, Absolute 0.38 10*3/mm3      Basophils, Absolute 0.04 10*3/mm3      Immature Grans, Absolute 0.02 10*3/mm3              XR Chest 1 View    Result Date: 11/6/2024  XR CHEST 1 VW Date of Exam: 11/6/2024 3:39 AM EST Indication: malaise Comparison: Chest radiograph 9/4/2024 Findings: The heart is enlarged. There is tortuosity of the thoracic aorta. There is discoid airspace disease in the left lung base favored to be atelectasis. Pneumonia is not excluded. There are small bilateral pleural effusions. There is no pneumothorax.     Impression: Impression: Cardiomegaly. Left basilar atelectasis. Small bilateral pleural effusions. Electronically Signed: Timothy Willoughby MD  11/6/2024 4:07 AM EST  Workstation ID: [id number]        MDM      Initial impression of presenting illness: Anterior epistaxis    DDX: includes but is not limited to: Posterior epistaxis, bleeding nasal polyp, telangiectasia    Patient arrives by EMS with vitals interpreted by myself.     Pertinent features from physical exam: Continued bleeding.    Initial diagnostic plan: Labs and imaging    Results from initial plan were reviewed and interpreted by me revealing mild drop in hemoglobin with slightly prolonged INR    Diagnostic information from other sources: I reviewed the patient's outside records including prior ER visit.  At that time the patient was treated with TXA as well as cocaine and attempted cautery.  None of these interventions were successful and eventually a Rhino Rocket was placed    Interventions /  Re-evaluation: Patient's epistaxis is significantly improved    Medications - No data to display    Results/clinical rationale were discussed with patient and patient's daughter      Data interpreted: Nursing notes reviewed, vital signs reviewed.  CBC with differential and CMP Images independantly reviewed and Chest XR reviewed independently interpreted by me.  EKG independently interpreted by me.  O2 saturation:      I discussed with the patient's daughter the need to withhold the aspirin for a period of time until he follows up with cardiology.  I expressed the importance of follow-up with ear nose throat.  Also recommended calling the rep for the patient's CPAP machine to see about humidification.  Given that so many other attempts at epistaxis treatment were tried in the previous visit I went immediately to [facility]    Care significantly affected by Social Determinants of Health (housing and economic circumstances, unemployment)               [] Yes     [x] No              If yes, Patient's care significantly limited by  Social Determinants of Health including:              [] Inadequate housing              [] Low income              [] Alcoholism and drug addiction in family              [] Problems related to primary support group              [] Unemployment              [] Problems related to employment              [] Other Social Determinants of Health:     Counseling: Discussed the results above with the patient regarding need for discharged home. Return precautions were given to patient and patient's daughter.  Patient understands and agrees plan of care.      -----  ED Disposition       ED Disposition   Discharge    Condition   Stable    Comment   --             Final diagnoses:   Epistaxis      Your Follow-Up Providers       [facility] EMERGENCY DEPARTMENT [place].    Specialty: Emergency Medicine  Follow up details: If symptoms worsen  [address] [facility] [address]  [facility] [address] [phone]             Dani Bueno MD In 2 days.    Specialty: Otolaryngology  [address]  [place] [id number] [phone]                       Contact information for after-discharge care    Follow-up information has not been specified.                    Your medication list        CONTINUE taking these medications        Instructions Last Dose Given Next Dose Due   albuterol sulfate  (90 Base) MCG/ACT inhaler  Commonly known as: PROVENTIL HFA;VENTOLIN HFA;PROAIR HFA      Inhale 2 puffs Every 4 (Four) Hours As Needed for Wheezing.       allopurinol 300 MG tablet  Commonly known as: ZYLOPRIM      Take 1 tablet by mouth Daily.       ascorbic acid 1000 MG tablet  Commonly known as: VITAMIN C      Take 1 tablet by mouth 2 (Two) Times a Day.       cephalexin 500 MG capsule  Commonly known as: KEFLEX      Take 1 capsule by mouth 2 (Two) Times a Day.       Coenzyme Q10 300 MG capsule      Take 1 capsule by mouth Every Night.       docusate sodium 100 MG capsule  Commonly known as: COLACE      Take 2 capsules by mouth At Night As Needed for Constipation.       donepezil 10 MG tablet  Commonly known as: ARICEPT      Take 1 tablet by mouth Every Night.       finasteride 5 MG tablet  Commonly known as: PROSCAR      Take 1 tablet by mouth every night at bedtime.       furosemide 40 MG tablet  Commonly known as: LASIX      Take 1 tablet by mouth Daily.       Iron 240 (27 Fe) MG tablet      Take 1 tablet by mouth Daily.       memantine 10 MG tablet  Commonly known as: NAMENDA      Take 1 tablet by mouth 2 (Two) Times a Day.       metFORMIN 1000 MG tablet  Commonly known as: GLUCOPHAGE      Take 1 tablet by mouth 2 (Two) Times a Day.       methenamine 1 g tablet  Commonly known as: HIPREX      Take 1 tablet by mouth 2 (Two) Times a Day With Meals.       metoprolol tartrate 100 MG tablet  Commonly known as: LOPRESSOR      Take 1 tablet by mouth 2 (Two)  Times a Day.       multivitamin with minerals tablet tablet      Take 1 tablet by mouth Every Night. Centrum Sliver       omeprazole 20 MG capsule  Commonly known as: priLOSEC      Take 1 capsule by mouth 2 (Two) Times a Day.       potassium chloride 10 MEQ CR tablet      Take 2 tablets by mouth Daily.       pravastatin 40 MG tablet  Commonly known as: PRAVACHOL      Take 1 tablet by mouth Daily.       PROBIOTIC ADVANCED PO      Take 1 tablet by mouth 2 (Two) Times a Day.       sertraline 50 MG tablet  Commonly known as: ZOLOFT      Take 1 tablet by mouth Daily.       tiotropium bromide-olodaterol 2.5-2.5 MCG/ACT aerosol solution inhaler  Commonly known as: STIOLTO RESPIMAT      Inhale 2 puffs Daily. 2 inh once a day              STOP taking these medications      aspirin 81 MG EC tablet

## 2024-11-06 NOTE — TELEPHONE ENCOUNTER
Caller: Nikki Ware    Relationship: Emergency Contact    Best call back number:       [phone] (Mobile)     What is the best time to reach you:     ANY TIME    Who are you requesting to speak with (clinical staff, provider,  specific staff member):     DR KARIMI OR NURSE    What was the call regarding:     CALLER REQUESTED A CALL BACK REGARDING PATIENT'S 3 VISITS TO EMERGENCY ROOM WITH MAJOR BLOODY NOSE    CALLER STATED NOSE PACKING FROM RECENT ED VISIT IS CAUSING PATIENT A LOT OF PAIN    CALLER STATED THE SOONEST APPOINTMENT WITH ENT IS MONDAY, 11/11    CALLER REQUESTED CONFIRMATION IF DR KARIMI COULD COMPLETE REFERRAL FOR PATIENT TO BE SEEN BY ENT SOONER

## 2024-11-07 NOTE — TELEPHONE ENCOUNTER
I called [facility] at [phone]  Talked to Monserrat, he is scheduled for Monday with Dr Renny Bueno at [facility] office, I told her that he is in a lot of pain and asked if we can move appointment up, can do today at 10 or 3:30, was able to reschedule him for today at 3:30 pm with Dr Renny Bueno at [address] [place] [address]

## 2024-11-07 NOTE — TELEPHONE ENCOUNTER
They usually leave the packing in for 7-10 days, but please call ENT and let them know that he is in pain and see if they can move up his appointment.  If they can't, let me know and I can call.

## 2024-11-07 NOTE — TELEPHONE ENCOUNTER
"Reached Nikki at [phone]   She said he is scheduled with Dr Bueno at [facility]-I told her that Dr Way said \"They usually leave the packing in for 7-10 days\" but that he asked me to call to see if we can move appointment up  "

## 2024-11-08 ENCOUNTER — TELEPHONE (OUTPATIENT)
Dept: INTERNAL MEDICINE | Facility: CLINIC | Age: 89
End: 2024-11-08
Payer: MEDICARE

## 2024-11-08 NOTE — TELEPHONE ENCOUNTER
DAUGHTER OF PATIENT HAS CALLED AND STATED SHE RECEIVED A LETTER FROM [employer] DENYING PHYSICAL THERAPY FOR THE PATIENT. DAUGHTER IS REQUESTING A CALL BACK WHY WITH EXPLANATION ON WHY INSURANCE IS DENYING. DAUGHTER STATES LETTER STATES FROM INSURANCE COMPANY THAT DR. KARIMI DID NOT GIVE AUTHORIZATION.    CALL BACK NUMBER [phone]

## 2024-11-08 NOTE — TELEPHONE ENCOUNTER
Advised Dru - daughter DR. Way is out of office today, asked if message can wait for his return. Dru verbalized understanding stating message can wait.

## 2024-11-11 NOTE — TELEPHONE ENCOUNTER
I have talked to Boy and Nikki, she is going to call insurance to see if  she can appeal and/or call Boy to discuss

## 2024-11-11 NOTE — TELEPHONE ENCOUNTER
I put in an order for PT on 9/10.  Please check on this and let her know what's going on with this.  Pito Way MD  07:25 EST  11/11/24

## 2024-11-13 ENCOUNTER — OFFICE VISIT (OUTPATIENT)
Dept: UROLOGY | Facility: CLINIC | Age: 89
End: 2024-11-13
Payer: MEDICARE

## 2024-11-13 ENCOUNTER — HOSPITAL ENCOUNTER (EMERGENCY)
Facility: HOSPITAL | Age: 89
Discharge: HOME | End: 2024-11-13
Attending: EMERGENCY MEDICINE | Admitting: EMERGENCY MEDICINE
Payer: MEDICARE

## 2024-11-13 VITALS
TEMPERATURE: 97.6 F | OXYGEN SATURATION: 98 % | HEIGHT: 75 IN | DIASTOLIC BLOOD PRESSURE: 94 MMHG | BODY MASS INDEX: 29.84 KG/M2 | RESPIRATION RATE: 16 BRPM | SYSTOLIC BLOOD PRESSURE: 127 MMHG | HEART RATE: 90 BPM | WEIGHT: 240 LBS

## 2024-11-13 VITALS — SYSTOLIC BLOOD PRESSURE: 129 MMHG | HEART RATE: 110 BPM | OXYGEN SATURATION: 94 % | DIASTOLIC BLOOD PRESSURE: 84 MMHG

## 2024-11-13 DIAGNOSIS — R04.0 EPISTAXIS: Primary | ICD-10-CM

## 2024-11-13 DIAGNOSIS — N39.0 RECURRENT UTI: ICD-10-CM

## 2024-11-13 DIAGNOSIS — R33.9 URINARY RETENTION: Primary | ICD-10-CM

## 2024-11-13 PROCEDURE — 99282 EMERGENCY DEPT VISIT SF MDM: CPT

## 2024-11-13 NOTE — PROGRESS NOTES
Follow Up Office Visit      Patient Name: Darien Camarena  : 1935   MRN: [id number]     Chief Complaint:    Chief Complaint   Patient presents with    Urinary Retention       Referring Provider: No ref. provider found    History of Present Illness: Darien Camarena is a 89 y.o. male who presents today for follow up of urinary retention and recurrent UTI. He is performing CIC 4 times daily. He is occasionally able to void on his own due to increase in his Lasix dose. He reports intermittent difficulty performing CIC and difficulty passing his catheters.     He has been dealing with nose bleeds.     He is currently is managed on Hiprex twice daily + vitamin C by Dr Og with [facility] for recurrent UTI.   Subjective      Review of System: Review of Systems   Genitourinary:         Urinary retention, CIC      I have reviewed the ROS documented by my clinical staff, I have updated appropriately and I agree. BERYL Dukes    I have reviewed and the following portions of the patient's history were updated as appropriate: past family history, past medical history, past social history, past surgical history and problem list.    Medications:     Current Outpatient Medications:     albuterol sulfate  (90 Base) MCG/ACT inhaler, Inhale 2 puffs Every 4 (Four) Hours As Needed for Wheezing., Disp: 54 g, Rfl: 1    allopurinol (ZYLOPRIM) 300 MG tablet, Take 1 tablet by mouth Daily., Disp: 90 tablet, Rfl: 1    ascorbic acid (VITAMIN C) 1000 MG tablet, Take 1 tablet by mouth 2 (Two) Times a Day., Disp: , Rfl:     cephalexin (KEFLEX) 500 MG capsule, Take 1 capsule by mouth 2 (Two) Times a Day., Disp: 10 capsule, Rfl: 0    Coenzyme Q10 300 MG capsule, Take 1 capsule by mouth Every Night., Disp: , Rfl:     docusate sodium (COLACE) 100 MG capsule, Take 2 capsules by mouth At Night As Needed for Constipation., Disp: , Rfl:     donepezil (ARICEPT) 10 MG tablet, Take 1 tablet by mouth Every Night., Disp: 90 tablet,  Rfl: 1    Ferrous Gluconate (IRON) 240 (27 FE) MG tablet, Take 1 tablet by mouth Daily., Disp: , Rfl:     finasteride (PROSCAR) 5 MG tablet, Take 1 tablet by mouth every night at bedtime., Disp: 90 tablet, Rfl: 1    furosemide (LASIX) 40 MG tablet, Take 1 tablet by mouth Daily., Disp: 90 tablet, Rfl: 3    memantine (NAMENDA) 10 MG tablet, Take 1 tablet by mouth 2 (Two) Times a Day., Disp: 180 tablet, Rfl: 1    metFORMIN (GLUCOPHAGE) 1000 MG tablet, Take 1 tablet by mouth 2 (Two) Times a Day., Disp: , Rfl:     methenamine (HIPREX) 1 g tablet, Take 1 tablet by mouth 2 (Two) Times a Day With Meals., Disp: 180 tablet, Rfl: 1    metoprolol tartrate (LOPRESSOR) 100 MG tablet, Take 1 tablet by mouth 2 (Two) Times a Day., Disp: 180 tablet, Rfl: 1    multivitamin with minerals (MULTIVITAMIN MEN 50+ PO), Take 1 tablet by mouth Every Night. Centrum Sliver, Disp: , Rfl:     omeprazole (priLOSEC) 20 MG capsule, Take 1 capsule by mouth 2 (Two) Times a Day., Disp: 180 capsule, Rfl: 1    potassium chloride 10 MEQ CR tablet, Take 2 tablets by mouth Daily., Disp: 180 tablet, Rfl: 3    pravastatin (PRAVACHOL) 40 MG tablet, Take 1 tablet by mouth Daily., Disp: 90 tablet, Rfl: 1    Probiotic Product (PROBIOTIC ADVANCED PO), Take 1 tablet by mouth 2 (Two) Times a Day., Disp: , Rfl:     sertraline (ZOLOFT) 50 MG tablet, Take 1 tablet by mouth Daily., Disp: 90 tablet, Rfl: 1    tiotropium bromide-olodaterol (STIOLTO RESPIMAT) 2.5-2.5 MCG/ACT aerosol solution inhaler, Inhale 2 puffs Daily. 2 inh once a day, Disp: 3 each, Rfl: 3    Allergies:   Allergies   Allergen Reactions    Xarelto [Rivaroxaban] GI Bleeding     GI bleed    Sglt2 Inhibitors Other (See Comments)     Recurrent UTI    Penicillins Hives     Has tolerated cefepime, ceftriaxone, cefazolin, cefdinir, cefuroxime, cephalexin       IPSS Questionnaire (AUA-7):  Over the past month…    1)  Incomplete Emptying:       How often have you had a sensation of not emptying you had the  sensation of not emptying your bladder completely after you finished urinating?  1 - Less than 1 time in 5   2)  Frequency:       How often have you had the urinate again less than two hours after you finished urinating?  2 - Less than half the time   3)  Intermittency:       How often have you found you stopped and started again several times when you urinated?   1 - Less than 1 time in 5   4) Urgency:      How often have you found it difficult to postpone urination?  3 - About half the time   5) Weak Stream:      How often have you had a weak urinary stream?  2 - Less than half the time   6) Straining:       How often have you had to push or strain to begin urination?  0 - Not at all   7) Nocturia:      How many times did you most typically get up to urinate from the time you went to bed at night until the time you got up in the morning?  1 - 1 time   Total Score:  10   The International Prostate Symptom Score (IPSS) is used to screen, diagnose, track symptoms of benign prostatic hyperplasia (BPH).   0-7 (Mild Symptoms) 8-19 (Moderate) 20-35 (Severe)   Quality of Life (QoL):  If you were to spend the rest of your life with your urinary condition just the way it is now, how would you feel about that? 5-Unhappy   Urine Leakage (Incontinence) 0-No Leakage       Objective     Physical Exam:   Vital Signs:   Vitals:    11/13/24 1420   BP: 129/84   Pulse: 110   SpO2: 94%     There is no height or weight on file to calculate BMI.     Physical Exam  Vitals and nursing note reviewed.   Constitutional:       Appearance: Normal appearance.   HENT:      Head: Normocephalic and atraumatic.      Nose: Nose normal.      Mouth/Throat:      Mouth: Mucous membranes are moist.   Eyes:      Pupils: Pupils are equal, round, and reactive to light.   Pulmonary:      Effort: Pulmonary effort is normal.   Abdominal:      General: Abdomen is flat.      Palpations: Abdomen is soft.   Musculoskeletal:         General: Normal range of motion.       Cervical back: Normal range of motion.      Comments: Wheelchair   Skin:     General: Skin is warm and dry.      Capillary Refill: Capillary refill takes less than 2 seconds.   Neurological:      General: No focal deficit present.      Mental Status: He is alert.   Psychiatric:         Mood and Affect: Mood normal.       Labs:   Brief Urine Lab Results  (Last result in the past 365 days)        Color   Clarity   Blood   Leuk Est   Nitrite   Protein   CREAT   Urine HCG        09/04/24 2148 Yellow   Clear   Negative   Small (1+)   Negative   Trace                   Urine Culture          2/1/2024    17:14 5/7/2024    15:07 9/4/2024    21:48   Urine Culture   Urine Culture No growth  50,000 CFU/mL Mixed Kareen Isolated  >100,000 CFU/mL Enterobacter cloacae complex         Lab Results   Component Value Date    GLUCOSE 111 (H) 11/06/2024    CALCIUM 8.3 (L) 11/06/2024     11/06/2024    K 3.6 11/06/2024    CO2 29.8 (H) 11/06/2024     11/06/2024    BUN 22 11/06/2024    CREATININE 0.70 (L) 11/06/2024    EGFRIFNONA 79 12/16/2021    BCR 31.4 (H) 11/06/2024    ANIONGAP 6.2 11/06/2024       Lab Results   Component Value Date    WBC 7.72 11/06/2024    HGB 10.0 (L) 11/06/2024    HCT 31.1 (L) 11/06/2024    .6 (H) 11/06/2024     11/06/2024       Images:   XR Chest 1 View    Result Date: 11/6/2024  Impression: Cardiomegaly. Left basilar atelectasis. Small bilateral pleural effusions. Electronically Signed: Timothy Willoughby MD  11/6/2024 4:07 AM EST  Workstation ID: [id number]    FL ERCP pancreatic and biliary ducts    Result Date: 11/5/2024  Impression: Fluoroscopy provided during ERCP. Electronically Signed: Oliverio Younger MD  11/5/2024 9:07 AM EST  Workstation ID: [id number]    FL ERCP pancreatic and biliary ducts    Result Date: 9/5/2024  Impression: Fluoroscopy provided during ERCP and stent placement. Electronically Signed: Oliverio Younger MD  9/5/2024 4:25 PM EDT  Workstation ID: [id number]    MRI abdomen wo contrast  mrcp    Result Date: 9/5/2024  Impression: Choledocholithiasis with several small stones in the distal common bile duct. The largest stone is about 5 mm. The common bile duct is not dilated. Electronically Signed: Timothy Willoughyb MD  9/5/2024 5:19 AM EDT  Workstation ID: [id number]    CT Abdomen Pelvis With Contrast    Result Date: 9/4/2024  1. There appears to be debris or noncalcified calculi within the distal common bile duct. No evidence of cholecystitis at this time. 2. No pulmonary embolus. 3. Small right and trace left pleural effusions. 4. 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 Cardiology consult recommended. Electronically Signed: Yoni Rios MD  9/4/2024 8:06 PM EDT  Workstation ID: [id number]    CT Angiogram Chest Pulmonary Embolism    Result Date: 9/4/2024  1. There appears to be debris or noncalcified calculi within the distal common bile duct. No evidence of cholecystitis at this time. 2. No pulmonary embolus. 3. Small right and trace left pleural effusions. 4. 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 Cardiology consult recommended. Electronically Signed: Yoni Rios MD  9/4/2024 8:06 PM EDT  Workstation ID: [id number]    CT Head Without Contrast    Result Date: 9/4/2024  Impression: 1. No acute intracranial findings by CT. Chronic findings detailed above similar to previous MRI brain comparison. 2. Possible acute on chronic sinusitis in the right clinical setting, with layering fluid noted in the right maxillary sinus. Electronically Signed: Compa Dominguez MD  9/4/2024 7:56 PM EDT  Workstation ID: [id number]    XR Chest 1 View    Result Date: 9/4/2024  Impression: No acute radiographic findings. Stable appearance of the chest since 6/3/2024 comparison. Electronically Signed: Compa Dominguez MD  9/4/2024 6:13 PM EDT  Workstation ID: [id number]      Measures:   Tobacco:   Darien Camarena  reports that he quit smoking about 64 years ago. His smoking use included cigarettes. He started  smoking about 77 years ago. He has a 15 pack-year smoking history. He has been exposed to tobacco smoke. He quit smokeless tobacco use about 13 years ago.  His smokeless tobacco use included chew.          Urine Incontinence: Patient reports that he is not currently experiencing symptoms of urinary incontinence.      Assessment / Plan      Assessment/Plan:   89 y.o. male who presented today for follow up of recurrent UTI and urinary retention. He will continue Hiprex + vitamin C per ID for recurrent UTI. He will follow up in 1-2 weeks with his catheters from home to trouble shoot difficulty passing CIC. He and his daughter are agreeable with plan of care.     Diagnoses and all orders for this visit:    1. Urinary retention (Primary)    2. Recurrent UTI           Follow Up:   Return for 1-2 weeks, CIC..    I spent approximately 20 minutes providing clinical care for this patient; including review of patient's chart and provider documentation, face to face time spent with patient in examination room (obtaining history, performing physical exam, discussing diagnosis and management options), placing orders, and completing patient documentation.     BERYL Nino  [facility] [place]

## 2024-11-13 NOTE — FSED PROVIDER NOTE
Subjective   History of Present Illness  Patient presents to the emergency department for epistaxis.  Is been having recurrent nosebleeds from the left nostril for the past several weeks.  Had a Rhino Rocket placed on the sixth head removed by ENT and has done well until tonight when started bleeding again.  Had been on antiplatelet therapy but has held this until follow-up with his cardiologist.  He denies any trauma or other injuries.    History provided by:  Patient   used: No        Review of Systems   HENT:  Positive for nosebleeds.    All other systems reviewed and are negative.      Past Medical History:   Diagnosis Date    Arrhythmia     Atrial fibrillation     Chronic kidney disease     COPD (chronic obstructive pulmonary disease)     Coronary artery disease     Diabetes mellitus     doesnt check sugar    E. coli sepsis 06/23/2022    Enlarged prostate without lower urinary tract symptoms (luts) 06/20/2016    Full dentures     GERD (gastroesophageal reflux disease)     Gout     Hearing aid worn     bilat prn    History of colonoscopy 09/12/2012    History of radiation therapy 02/24/2023    SBRT LLL lung    [place] (hard of hearing)     hearing aids prn    Hyperlipidemia     Hypertension     Kidney stone     surgery x1    Lung cancer     STEVEN on CPAP     compliant with machine    PAF (paroxysmal atrial fibrillation)     Prostatism     Urinary incontinence     Urinary tract infection     Wears glasses     readers       Allergies   Allergen Reactions    Xarelto [Rivaroxaban] GI Bleeding     GI bleed    Sglt2 Inhibitors Other (See Comments)     Recurrent UTI    Penicillins Hives     Has tolerated cefepime, ceftriaxone, cefazolin, cefdinir, cefuroxime, cephalexin       Past Surgical History:   Procedure Laterality Date    ATRIAL APPENDAGE EXCLUSION LEFT WITH TRANSESOPHAGEAL ECHOCARDIOGRAM N/A 11/28/2023    Procedure: Atrial Appendage Occlusion;  Surgeon: Anthony Mcdaniel MD;  Location: [facility]  INVASIVE LOCATION;  Service: Cardiovascular;  Laterality: N/A;    CARDIAC CATHETERIZATION Left 09/29/2022    Procedure: Left Heart Cath;  Surgeon: Titus Oliveros IV, MD;  Location:  [facility] INVASIVE LOCATION;  Service: Cardiovascular;  Laterality: Left;    CARDIOVERSION      CATARACT EXTRACTION Bilateral     COLONOSCOPY      CYSTOSCOPY      ENDOSCOPY      possible    ENDOSCOPY N/A 9/5/2024    Procedure: ESOPHAGOGASTRODUODENOSCOPY;  Surgeon: Anthony Arambula MD;  Location:  [facility];  Service: Gastroenterology;  Laterality: N/A;  Esophagus dilated to 18mm with 12mm-mm to 15mm, then 15mm to 18mm balloon.    ENDOSCOPY N/A 10/31/2024    Procedure: ESOPHAGOGASTRODUODENOSCOPY WITH BIOPSY;  Surgeon: Carlos Dior MD;  Location:  [facility];  Service: Gastroenterology;  Laterality: N/A;    ERCP N/A 9/5/2024    Procedure: ENDOSCOPIC RETROGRADE CHOLANGIOPANCREATOGRAPHY;  Surgeon: Anthony Arambula MD;  Location:  [facility];  Service: Gastroenterology;  Laterality: N/A;  Sphincterotomy made to ampula of common bile duct (CBD), CBD swept with 9mm-12mm balloon - sludge, stones and purulent appearing drainage extracted. 10x7 Mongolian biliary stent depolyed into CBD. ERCP scope removed with balloon intact.    ERCP N/A 10/31/2024    Procedure: ENDOSCOPIC RETROGRADE CHOLANGIOPANCREATOGRAPHY;  Surgeon: Carlos Dior MD;  Location:  [facility];  Service: Gastroenterology;  Laterality: N/A;    KIDNEY STONE SURGERY      x1       Family History   Problem Relation Age of Onset    Alzheimer's disease Mother     COPD Father     Lung cancer Father     Cancer Father     Kidney disease Father     No Known Problems Daughter     No Known Problems Daughter     No Known Problems Daughter        Social History     Socioeconomic History    Marital status:    Tobacco Use    Smoking status: Former     Current packs/day: 0.00     Average packs/day: 1 pack/day for 15.0 years (15.0 ttl  pk-yrs)     Types: Cigarettes     Start date: 1947     Quit date: 1960     Years since quittin.5     Passive exposure: Past    Smokeless tobacco: Former     Types: Chew     Quit date:     Tobacco comments:     started at 13 yo.   Vaping Use    Vaping status: Never Used    Passive vaping exposure: Yes   Substance and Sexual Activity    Alcohol use: No    Drug use: Never    Sexual activity: Defer     Partners: Female           Objective   Physical Exam  Vitals and nursing note reviewed.   Constitutional:       Appearance: Normal appearance.   HENT:      Head: Normocephalic and atraumatic.      Nose:      Right Nostril: No foreign body, epistaxis or septal hematoma.      Left Nostril: Epistaxis present. No foreign body or septal hematoma.      Right Turbinates: Not swollen.      Left Turbinates: Swollen.      Mouth/Throat:      Mouth: Mucous membranes are moist.      Pharynx: Oropharynx is clear.   Eyes:      Extraocular Movements: Extraocular movements intact.      Pupils: Pupils are equal, round, and reactive to light.   Cardiovascular:      Rate and Rhythm: Normal rate and regular rhythm.   Pulmonary:      Effort: Pulmonary effort is normal. No respiratory distress.      Breath sounds: Normal breath sounds. No wheezing.   Abdominal:      General: There is no distension.      Palpations: Abdomen is soft.      Tenderness: There is no abdominal tenderness. There is no guarding.   Musculoskeletal:         General: No swelling, tenderness or deformity. Normal range of motion.      Cervical back: Normal range of motion and neck supple.   Skin:     General: Skin is warm and dry.      Capillary Refill: Capillary refill takes less than 2 seconds.   Neurological:      General: No focal deficit present.      Mental Status: He is alert and oriented to person, place, and time.   Psychiatric:         Mood and Affect: Mood normal.         Behavior: Behavior normal.         Epistaxis Management    Date/Time:  11/13/2024 3:22 AM    Performed by: Philip Simon MD  Authorized by: Philip Simon MD    Consent:     Consent given by:  Patient    Risks, benefits, and alternatives were discussed: yes      Risks discussed:  Bleeding, infection, nasal injury and pain    Alternatives discussed:  No treatment and referral  Universal protocol:     Procedure explained and questions answered to patient or proxy's satisfaction: yes      Relevant documents present and verified: yes      Test results available: yes      Imaging studies available: yes      Required blood products, implants, devices, and special equipment available: yes      Site/side marked: yes      Patient identity confirmed:  Verbally with patient  Anesthesia:     Anesthesia method:  None  Procedure details:     Treatment site:  L anterior    Treatment method:  Nasal balloon    Treatment complexity:  Limited    Treatment episode: recurring    Post-procedure details:     Assessment:  Bleeding stopped    Procedure completion:  Tolerated             ED Course                                           Medical Decision Making  Hemodynamically stable and afebrile.  Patient has recurrent epistaxis in the left nostril.  Had a small ooze after holding pressure.  He has failed multiple treatments in the past Rhino Rocket was used.  This controlled the bleeding.  Will follow-up with ENT.    Amount and/or Complexity of Data Reviewed  External Data Reviewed: notes.        Final diagnoses:   Epistaxis       ED Disposition  ED Disposition       ED Disposition   Discharge    Condition   Stable    Comment   --               Pito Way MD  [address]  [place] [id number]  [phone]    Schedule an appointment as soon as possible for a visit       [facility] EMERGENCY DEPARTMENT [place]  [address]  [facility] [address] [phone]  Go to   As needed    Dani Bueno MD  [address]  [facility] [address]  [phone]    Schedule an appointment as soon as possible for a visit            Medication List      No changes were made to your prescriptions during this visit.

## 2024-11-18 ENCOUNTER — TELEPHONE (OUTPATIENT)
Dept: INTERNAL MEDICINE | Facility: CLINIC | Age: 89
End: 2024-11-18
Payer: MEDICARE

## 2024-11-18 ENCOUNTER — OFFICE VISIT (OUTPATIENT)
Dept: CARDIOLOGY | Facility: CLINIC | Age: 89
End: 2024-11-18
Payer: MEDICARE

## 2024-11-18 ENCOUNTER — OFFICE VISIT (OUTPATIENT)
Dept: INTERNAL MEDICINE | Facility: CLINIC | Age: 89
End: 2024-11-18
Payer: MEDICARE

## 2024-11-18 VITALS
HEIGHT: 75 IN | OXYGEN SATURATION: 95 % | SYSTOLIC BLOOD PRESSURE: 116 MMHG | WEIGHT: 219 LBS | DIASTOLIC BLOOD PRESSURE: 78 MMHG | HEART RATE: 99 BPM | BODY MASS INDEX: 27.23 KG/M2

## 2024-11-18 DIAGNOSIS — R04.0 BLEEDING FROM THE NOSE: Primary | ICD-10-CM

## 2024-11-18 DIAGNOSIS — E78.5 HYPERLIPIDEMIA LDL GOAL <100: Chronic | ICD-10-CM

## 2024-11-18 DIAGNOSIS — I50.22 HEART FAILURE WITH MILDLY REDUCED EJECTION FRACTION (HFMREF): ICD-10-CM

## 2024-11-18 DIAGNOSIS — Z95.818 PRESENCE OF WATCHMAN LEFT ATRIAL APPENDAGE CLOSURE DEVICE: ICD-10-CM

## 2024-11-18 DIAGNOSIS — I48.21 PERMANENT ATRIAL FIBRILLATION: ICD-10-CM

## 2024-11-18 DIAGNOSIS — I25.10 CORONARY ARTERY DISEASE INVOLVING NATIVE CORONARY ARTERY OF NATIVE HEART WITHOUT ANGINA PECTORIS: Primary | ICD-10-CM

## 2024-11-18 DIAGNOSIS — I10 ESSENTIAL HYPERTENSION: Chronic | ICD-10-CM

## 2024-11-18 LAB
ALBUMIN SERPL-MCNC: 3.2 G/DL (ref 3.5–5.2)
ALBUMIN/GLOB SERPL: 0.9 G/DL
ALP SERPL-CCNC: 222 U/L (ref 39–117)
ALT SERPL W P-5'-P-CCNC: 28 U/L (ref 1–41)
ANION GAP SERPL CALCULATED.3IONS-SCNC: 10 MMOL/L (ref 5–15)
AST SERPL-CCNC: 43 U/L (ref 1–40)
BASOPHILS # BLD AUTO: 0.08 10*3/MM3 (ref 0–0.2)
BASOPHILS NFR BLD AUTO: 1 % (ref 0–1.5)
BILIRUB SERPL-MCNC: 0.8 MG/DL (ref 0–1.2)
BUN SERPL-MCNC: 19 MG/DL (ref 8–23)
BUN/CREAT SERPL: 16.7 (ref 7–25)
CALCIUM SPEC-SCNC: 9.2 MG/DL (ref 8.6–10.5)
CHLORIDE SERPL-SCNC: 100 MMOL/L (ref 98–107)
CO2 SERPL-SCNC: 32 MMOL/L (ref 22–29)
CREAT SERPL-MCNC: 1.14 MG/DL (ref 0.76–1.27)
DEPRECATED RDW RBC AUTO: 52.3 FL (ref 37–54)
EGFRCR SERPLBLD CKD-EPI 2021: 61.9 ML/MIN/1.73
EOSINOPHIL # BLD AUTO: 0.26 10*3/MM3 (ref 0–0.4)
EOSINOPHIL NFR BLD AUTO: 3.2 % (ref 0.3–6.2)
ERYTHROCYTE [DISTWIDTH] IN BLOOD BY AUTOMATED COUNT: 14.5 % (ref 12.3–15.4)
GLOBULIN UR ELPH-MCNC: 3.6 GM/DL
GLUCOSE SERPL-MCNC: 61 MG/DL (ref 65–99)
HCT VFR BLD AUTO: 34.7 % (ref 37.5–51)
HGB BLD-MCNC: 11.4 G/DL (ref 13–17.7)
IMM GRANULOCYTES # BLD AUTO: 0.02 10*3/MM3 (ref 0–0.05)
IMM GRANULOCYTES NFR BLD AUTO: 0.2 % (ref 0–0.5)
LYMPHOCYTES # BLD AUTO: 1.59 10*3/MM3 (ref 0.7–3.1)
LYMPHOCYTES NFR BLD AUTO: 19.8 % (ref 19.6–45.3)
MCH RBC QN AUTO: 32.8 PG (ref 26.6–33)
MCHC RBC AUTO-ENTMCNC: 32.9 G/DL (ref 31.5–35.7)
MCV RBC AUTO: 99.7 FL (ref 79–97)
MONOCYTES # BLD AUTO: 0.52 10*3/MM3 (ref 0.1–0.9)
MONOCYTES NFR BLD AUTO: 6.5 % (ref 5–12)
NEUTROPHILS NFR BLD AUTO: 5.55 10*3/MM3 (ref 1.7–7)
NEUTROPHILS NFR BLD AUTO: 69.3 % (ref 42.7–76)
NRBC BLD AUTO-RTO: 0 /100 WBC (ref 0–0.2)
NT-PROBNP SERPL-MCNC: 3974 PG/ML (ref 0–1800)
PLATELET # BLD AUTO: 297 10*3/MM3 (ref 140–450)
PMV BLD AUTO: 10.5 FL (ref 6–12)
POTASSIUM SERPL-SCNC: 4.2 MMOL/L (ref 3.5–5.2)
PROT SERPL-MCNC: 6.8 G/DL (ref 6–8.5)
RBC # BLD AUTO: 3.48 10*6/MM3 (ref 4.14–5.8)
SODIUM SERPL-SCNC: 142 MMOL/L (ref 136–145)
WBC NRBC COR # BLD AUTO: 8.02 10*3/MM3 (ref 3.4–10.8)

## 2024-11-18 PROCEDURE — 80053 COMPREHEN METABOLIC PANEL: CPT | Performed by: FAMILY MEDICINE

## 2024-11-18 PROCEDURE — 1160F RVW MEDS BY RX/DR IN RCRD: CPT

## 2024-11-18 PROCEDURE — 83880 ASSAY OF NATRIURETIC PEPTIDE: CPT | Performed by: FAMILY MEDICINE

## 2024-11-18 PROCEDURE — 1160F RVW MEDS BY RX/DR IN RCRD: CPT | Performed by: FAMILY MEDICINE

## 2024-11-18 PROCEDURE — 99214 OFFICE O/P EST MOD 30 MIN: CPT | Performed by: FAMILY MEDICINE

## 2024-11-18 PROCEDURE — 1159F MED LIST DOCD IN RCRD: CPT

## 2024-11-18 PROCEDURE — 1126F AMNT PAIN NOTED NONE PRSNT: CPT | Performed by: FAMILY MEDICINE

## 2024-11-18 PROCEDURE — 85025 COMPLETE CBC W/AUTO DIFF WBC: CPT | Performed by: FAMILY MEDICINE

## 2024-11-18 PROCEDURE — 99214 OFFICE O/P EST MOD 30 MIN: CPT

## 2024-11-18 PROCEDURE — 1159F MED LIST DOCD IN RCRD: CPT | Performed by: FAMILY MEDICINE

## 2024-11-18 NOTE — PROGRESS NOTES
Cardiology Outpatient Visit      Identification: Darien Camarena is a 89 y.o. male who resides in [place], lives alone, continues to drive    Reason for visit:  Heart failure with mildly reduced ejection fraction (HFmrEF) and Permanent atrial fibrillation      Joaquim Camarena returns to the office today for 1 month follow-up of CHF accompanied by his daughter.  Weight down to 219 from 245 1 month ago.  He continues to have issues with epistaxis and urinary retention.  He is followed with both ENT and urology.  He may require ENT procedure for epistaxis if this persists.  He has been off aspirin for greater than 1 week at their instruction.  He is having to self cath 4 times a day.  He is not following daily weights.  His daughter continues to report issues with patient's dementia-there are days he is not sure as if he has eaten.  He does receive Meals on Wheels 5 days a week but no assistance on the weekends.  He does utilize a cane to walk around his house and to the mailbox.  He does report dyspnea with exertion but denies orthopnea/PND.  He is utilizing knee-high compression socks.  He is scheduled for [facility] on Thursday for watchman follow-up.  He had lab work drawn with primary care today and we have called to add proBNP.       Review of Systems   Constitutional: Positive for malaise/fatigue.   Cardiovascular:  Positive for dyspnea on exertion and leg swelling.   All other systems reviewed and are negative.      Allergies   Allergen Reactions    Sglt2 Inhibitors Other (See Comments)     Recurrent UTI    Xarelto [Rivaroxaban] GI Bleeding     GI bleed    Penicillins Hives     Has tolerated cefepime, ceftriaxone, cefazolin, cefdinir, cefuroxime, cephalexin       Current Outpatient Medications   Medication Instructions    albuterol sulfate  (90 Base) MCG/ACT inhaler 2 puffs, Inhalation, Every 4 Hours PRN    allopurinol (ZYLOPRIM) 300 mg, Oral, Daily    ascorbic acid (VITAMIN C) 1,000 mg, 2  "Times Daily    cephalexin (KEFLEX) 500 mg, Oral, 2 Times Daily    Coenzyme Q10 300 MG capsule 1 capsule, Nightly    docusate sodium (COLACE) 200 mg, Nightly PRN    donepezil (ARICEPT) 10 mg, Oral, Nightly    Ferrous Gluconate (IRON) 240 (27 FE) MG tablet 1 tablet, Daily    finasteride (PROSCAR) 5 mg, Oral, Every Night at Bedtime    furosemide (LASIX) 40 mg, Oral, Daily    memantine (NAMENDA) 10 mg, Oral, 2 Times Daily    metFORMIN (GLUCOPHAGE) 1,000 mg, Oral, 2 Times Daily    methenamine (HIPREX) 1 g, Oral, 2 Times Daily With Meals    metoprolol tartrate (LOPRESSOR) 100 mg, Oral, 2 Times Daily    multivitamin with minerals (MULTIVITAMIN MEN 50+ PO) 1 tablet, Nightly    omeprazole (PRILOSEC) 20 mg, Oral, 2 Times Daily    potassium chloride 10 MEQ CR tablet 20 mEq, Oral, Daily    pravastatin (PRAVACHOL) 40 mg, Oral, Daily    Probiotic Product (PROBIOTIC ADVANCED PO) 1 tablet, 2 Times Daily    sertraline (ZOLOFT) 50 mg, Oral, Daily    tiotropium bromide-olodaterol (STIOLTO RESPIMAT) 2.5-2.5 MCG/ACT aerosol solution inhaler 2 puffs, Inhalation, Daily, 2 inh once a day       Objective     /78 (BP Location: Left arm, Patient Position: Sitting)   Pulse 99   Ht 190.5 cm (75\")   Wt 99.3 kg (219 lb)   SpO2 95%   BMI 27.37 kg/m²       Vitals reviewed.   Constitutional:       General: Awake. Not in acute distress.     Appearance: Frail.      Comments: Walks with cane   Pulmonary:      Effort: Pulmonary effort is normal.      Breath sounds: Normal breath sounds.   Cardiovascular:      Normal rate. Irregularly irregular rhythm.   Pulses:     Intact distal pulses.   Edema:     Peripheral edema present.     Comments: Left greater than sign right-compression socks in use  Skin:     General: Skin is warm and dry.   Neurological:      General: No focal deficit present.   Psychiatric:         Behavior: Behavior is cooperative.         Result Review  (reviewed with patient):          Lab Results   Component Value Date    " GLUCOSE 111 (H) 11/06/2024    BUN 22 11/06/2024    CREATININE 0.70 (L) 11/06/2024     11/06/2024    K 3.6 11/06/2024     11/06/2024    CALCIUM 8.3 (L) 11/06/2024    PROTEINTOT 5.8 (L) 11/06/2024    ALBUMIN 3.0 (L) 11/06/2024    ALT 12 11/06/2024    AST 31 11/06/2024    ALKPHOS 170 (H) 11/06/2024    BILITOT 0.9 11/06/2024    GLOB 2.8 11/06/2024    AGRATIO 1.1 11/06/2024    BCR 31.4 (H) 11/06/2024    ANIONGAP 6.2 11/06/2024    EGFR 88.6 11/06/2024     Lab Results   Component Value Date    WBC 7.72 11/06/2024    HGB 10.0 (L) 11/06/2024    HCT 31.1 (L) 11/06/2024    .6 (H) 11/06/2024     11/06/2024     Lab Results   Component Value Date    CHOL 75 05/07/2024    TRIG 83 05/07/2024    HDL 26 (L) 05/07/2024    LDL 32 05/07/2024     Lab Results   Component Value Date    HGBA1C 5.90 (H) 05/07/2024       Assessment     Diagnoses and all orders for this visit:    1. Coronary artery disease involving native coronary artery of native heart without angina pectoris (Primary)  Overview:  Cardiac catheterization (9/29/2022): Mild CAD.  Normal LV filling pressure    Assessment & Plan:  Mild JIMENES, no orthopnea/PND  Trace-mild LE edema   Continue furosemide   Restart ASA when ok with ENT.      2. Permanent atrial fibrillation  Overview:  Diagnosed 2012.   [facility]AD-guided ECV, 8/17/2012  CHADS-VASc 5 (age > 75, CAD, HTN, DM)  Multiple cardioversions, 2018, 2019, 2020, 2021  Unsuccessful cardioversion with conversion to rate control strategy, 2023  Echo (8/8/2022): EF 50%, anatomically and functionally normal valves  Watchman implant by Anthony Mcdaniel, 11/28/2023  FARHAD (1/12/2024): LVEF 40%.  27 mm Watchman device well-seated.  No thrombus or periprosthetic flow.   Limited TTE (1/27/2024): LVEF 48%  [facility] scheduled for 11/21/2024 for Watchman follow up    Assessment & Plan:  Rate controlled, asymptomatic  proBNP pending  ASA currently held due to recurrent epistaxis - under evaluation of ENT (RENAY Bueno)  Continue metoprolol  for rate control      3. Essential hypertension  Overview:  Target blood pressure <130/80 mmHg    Assessment & Plan:  Well controlled today  Continue metoprolol tartrate 100 mg BID  Intolerant to valsartan  CMP pending today      4. Presence of Watchman left atrial appendage closure device  Overview:  Plavix discontinued 5/2024.    Assessment & Plan:  ASA currently held due to recurrent epistaxis  F[facility]AD scheduled for 11/21 for Watchman follow up      5. Hyperlipidemia LDL goal <100  Overview:  Moderate intensity statin therapy reasonable due to diabetic status  LDL 32 (5/2024)    Assessment & Plan:               Plan   Check labs - CMP, CBC, proBNP pending  Continue current medications  Restart ASA when ENT allows.  Recommend daily weights.  Will schedule sooner follow up if needed      Follow-up   Return for keep FU with BERYL Buitrago.      BERYL Schofield   11/18/2024

## 2024-11-18 NOTE — H&P (VIEW-ONLY)
Cardiology Outpatient Visit      Identification: Darien Camarena is a 89 y.o. male who resides in [place], lives alone, continues to drive    Reason for visit:  Heart failure with mildly reduced ejection fraction (HFmrEF) and Permanent atrial fibrillation      Joaquim Camarena returns to the office today for 1 month follow-up of CHF accompanied by his daughter.  Weight down to 219 from 245 1 month ago.  He continues to have issues with epistaxis and urinary retention.  He is followed with both ENT and urology.  He may require ENT procedure for epistaxis if this persists.  He has been off aspirin for greater than 1 week at their instruction.  He is having to self cath 4 times a day.  He is not following daily weights.  His daughter continues to report issues with patient's dementia-there are days he is not sure as if he has eaten.  He does receive Meals on Wheels 5 days a week but no assistance on the weekends.  He does utilize a cane to walk around his house and to the mailbox.  He does report dyspnea with exertion but denies orthopnea/PND.  He is utilizing knee-high compression socks.  He is scheduled for [facility] on Thursday for watchman follow-up.  He had lab work drawn with primary care today and we have called to add proBNP.       Review of Systems   Constitutional: Positive for malaise/fatigue.   Cardiovascular:  Positive for dyspnea on exertion and leg swelling.   All other systems reviewed and are negative.      Allergies   Allergen Reactions    Sglt2 Inhibitors Other (See Comments)     Recurrent UTI    Xarelto [Rivaroxaban] GI Bleeding     GI bleed    Penicillins Hives     Has tolerated cefepime, ceftriaxone, cefazolin, cefdinir, cefuroxime, cephalexin       Current Outpatient Medications   Medication Instructions    albuterol sulfate  (90 Base) MCG/ACT inhaler 2 puffs, Inhalation, Every 4 Hours PRN    allopurinol (ZYLOPRIM) 300 mg, Oral, Daily    ascorbic acid (VITAMIN C) 1,000 mg, 2  "Times Daily    cephalexin (KEFLEX) 500 mg, Oral, 2 Times Daily    Coenzyme Q10 300 MG capsule 1 capsule, Nightly    docusate sodium (COLACE) 200 mg, Nightly PRN    donepezil (ARICEPT) 10 mg, Oral, Nightly    Ferrous Gluconate (IRON) 240 (27 FE) MG tablet 1 tablet, Daily    finasteride (PROSCAR) 5 mg, Oral, Every Night at Bedtime    furosemide (LASIX) 40 mg, Oral, Daily    memantine (NAMENDA) 10 mg, Oral, 2 Times Daily    metFORMIN (GLUCOPHAGE) 1,000 mg, Oral, 2 Times Daily    methenamine (HIPREX) 1 g, Oral, 2 Times Daily With Meals    metoprolol tartrate (LOPRESSOR) 100 mg, Oral, 2 Times Daily    multivitamin with minerals (MULTIVITAMIN MEN 50+ PO) 1 tablet, Nightly    omeprazole (PRILOSEC) 20 mg, Oral, 2 Times Daily    potassium chloride 10 MEQ CR tablet 20 mEq, Oral, Daily    pravastatin (PRAVACHOL) 40 mg, Oral, Daily    Probiotic Product (PROBIOTIC ADVANCED PO) 1 tablet, 2 Times Daily    sertraline (ZOLOFT) 50 mg, Oral, Daily    tiotropium bromide-olodaterol (STIOLTO RESPIMAT) 2.5-2.5 MCG/ACT aerosol solution inhaler 2 puffs, Inhalation, Daily, 2 inh once a day       Objective     /78 (BP Location: Left arm, Patient Position: Sitting)   Pulse 99   Ht 190.5 cm (75\")   Wt 99.3 kg (219 lb)   SpO2 95%   BMI 27.37 kg/m²       Vitals reviewed.   Constitutional:       General: Awake. Not in acute distress.     Appearance: Frail.      Comments: Walks with cane   Pulmonary:      Effort: Pulmonary effort is normal.      Breath sounds: Normal breath sounds.   Cardiovascular:      Normal rate. Irregularly irregular rhythm.   Pulses:     Intact distal pulses.   Edema:     Peripheral edema present.     Comments: Left greater than sign right-compression socks in use  Skin:     General: Skin is warm and dry.   Neurological:      General: No focal deficit present.   Psychiatric:         Behavior: Behavior is cooperative.         Result Review  (reviewed with patient):          Lab Results   Component Value Date    " GLUCOSE 111 (H) 11/06/2024    BUN 22 11/06/2024    CREATININE 0.70 (L) 11/06/2024     11/06/2024    K 3.6 11/06/2024     11/06/2024    CALCIUM 8.3 (L) 11/06/2024    PROTEINTOT 5.8 (L) 11/06/2024    ALBUMIN 3.0 (L) 11/06/2024    ALT 12 11/06/2024    AST 31 11/06/2024    ALKPHOS 170 (H) 11/06/2024    BILITOT 0.9 11/06/2024    GLOB 2.8 11/06/2024    AGRATIO 1.1 11/06/2024    BCR 31.4 (H) 11/06/2024    ANIONGAP 6.2 11/06/2024    EGFR 88.6 11/06/2024     Lab Results   Component Value Date    WBC 7.72 11/06/2024    HGB 10.0 (L) 11/06/2024    HCT 31.1 (L) 11/06/2024    .6 (H) 11/06/2024     11/06/2024     Lab Results   Component Value Date    CHOL 75 05/07/2024    TRIG 83 05/07/2024    HDL 26 (L) 05/07/2024    LDL 32 05/07/2024     Lab Results   Component Value Date    HGBA1C 5.90 (H) 05/07/2024       Assessment     Diagnoses and all orders for this visit:    1. Coronary artery disease involving native coronary artery of native heart without angina pectoris (Primary)  Overview:  Cardiac catheterization (9/29/2022): Mild CAD.  Normal LV filling pressure    Assessment & Plan:  Mild JIMENES, no orthopnea/PND  Trace-mild LE edema   Continue furosemide   Restart ASA when ok with ENT.      2. Permanent atrial fibrillation  Overview:  Diagnosed 2012.   FARHAD-guided ECV, 8/17/2012  CHADS-VASc 5 (age > 75, CAD, HTN, DM)  Multiple cardioversions, 2018, 2019, 2020, 2021  Unsuccessful cardioversion with conversion to rate control strategy, 2023  Echo (8/8/2022): EF 50%, anatomically and functionally normal valves  Watchman implant by Anthony Mcdaniel, 11/28/2023  FARHAD (1/12/2024): LVEF 40%.  27 mm Watchman device well-seated.  No thrombus or periprosthetic flow.   Limited TTE (1/27/2024): LVEF 48%  F[facility]AD scheduled for 11/21/2024 for Watchman follow up    Assessment & Plan:  Rate controlled, asymptomatic  proBNP pending  ASA currently held due to recurrent epistaxis - under evaluation of ENT (RENAY Bueno)  Continue metoprolol  for rate control      3. Essential hypertension  Overview:  Target blood pressure <130/80 mmHg    Assessment & Plan:  Well controlled today  Continue metoprolol tartrate 100 mg BID  Intolerant to valsartan  CMP pending today      4. Presence of Watchman left atrial appendage closure device  Overview:  Plavix discontinued 5/2024.    Assessment & Plan:  ASA currently held due to recurrent epistaxis  F[facility]AD scheduled for 11/21 for Watchman follow up      5. Hyperlipidemia LDL goal <100  Overview:  Moderate intensity statin therapy reasonable due to diabetic status  LDL 32 (5/2024)    Assessment & Plan:               Plan   Check labs - CMP, CBC, proBNP pending  Continue current medications  Restart ASA when ENT allows.  Recommend daily weights.  Will schedule sooner follow up if needed      Follow-up   Return for keep FU with BERYL Buitrago.      BERYL Schofield   11/18/2024

## 2024-11-18 NOTE — ASSESSMENT & PLAN NOTE
Mild JIMENES, no orthopnea/PND  Trace-mild LE edema   Continue furosemide   Restart ASA when ok with ENT.

## 2024-11-18 NOTE — ASSESSMENT & PLAN NOTE
Well controlled today  Continue metoprolol tartrate 100 mg BID  Intolerant to valsartan  CMP pending today

## 2024-11-18 NOTE — TELEPHONE ENCOUNTER
Advised javad to add on probnp to cmp that was drawn today. Javad stated that she will check to see if it can be added when she scans in specimen. Javad stated will call back.

## 2024-11-18 NOTE — ASSESSMENT & PLAN NOTE
Rate controlled, asymptomatic  proBNP pending  ASA currently held due to recurrent epistaxis - under evaluation of ENT (RENAY Bueno)  Continue metoprolol for rate control

## 2024-11-18 NOTE — PROGRESS NOTES
Follow Up Office Visit      Date: 2024   Patient Name: Darien Camarena  : 1935   MRN: [id number]   PCP: Patient Care Team:  Pito Way MD as PCP - General  Pito Way MD as PCP - Family Medicine  Titus Oliveros IV, MD as Consulting Physician (Cardiology)  Blanquita Ansari APRN as Nurse Practitioner (Interventional Cardiology)  Abbe Leary MD as Consulting Physician (Radiation Oncology)  Last Og MD as Consulting Physician (Infectious Diseases)  Vikram Eagle MD as Consulting Physician (Pulmonary Disease)  Krystian Larkin MD as Consulting Physician (Urology)  Deanne Arreola APRN as Nurse Practitioner (Cardiology)  Yoni Drew MD as Consulting Physician (Cardiology)     Chief Complaint:    Chief Complaint   Patient presents with    [facility] fu  Last 2024       History of Present Illness: Darien Camarena is a 89 y.o. male who is here today for recently been to the ER 4 times for nose bleeds. Patient's daughter reports patients has been losing weight over the last few months. Patient follows up with ENT on Thursday.     Subjective      Review of Systems:   Review of Systems   Constitutional:  Positive for fatigue and unexpected weight change. Negative for activity change and diaphoresis.   HENT:  Positive for nosebleeds and postnasal drip. Negative for congestion and sinus pressure.    Eyes:  Negative for redness.   Respiratory:  Negative for shortness of breath.    Cardiovascular:  Negative for chest pain.   Gastrointestinal:  Negative for diarrhea and nausea.   Genitourinary:  Negative for difficulty urinating.   Neurological:  Negative for tremors.   Psychiatric/Behavioral:  Negative for agitation.         I have reviewed the patients family history, social history, past medical history, past surgical history and have updated it as appropriate.     Medications:     Current Outpatient Medications:      albuterol sulfate  (90 Base) MCG/ACT inhaler, Inhale 2 puffs Every 4 (Four) Hours As Needed for Wheezing., Disp: 54 g, Rfl: 1    allopurinol (ZYLOPRIM) 300 MG tablet, Take 1 tablet by mouth Daily., Disp: 90 tablet, Rfl: 1    ascorbic acid (VITAMIN C) 1000 MG tablet, Take 1 tablet by mouth 2 (Two) Times a Day., Disp: , Rfl:     cephalexin (KEFLEX) 500 MG capsule, Take 1 capsule by mouth 2 (Two) Times a Day., Disp: 10 capsule, Rfl: 0    Coenzyme Q10 300 MG capsule, Take 1 capsule by mouth Every Night., Disp: , Rfl:     docusate sodium (COLACE) 100 MG capsule, Take 2 capsules by mouth At Night As Needed for Constipation., Disp: , Rfl:     donepezil (ARICEPT) 10 MG tablet, Take 1 tablet by mouth Every Night., Disp: 90 tablet, Rfl: 1    Ferrous Gluconate (IRON) 240 (27 FE) MG tablet, Take 1 tablet by mouth Daily., Disp: , Rfl:     finasteride (PROSCAR) 5 MG tablet, Take 1 tablet by mouth every night at bedtime., Disp: 90 tablet, Rfl: 1    furosemide (LASIX) 40 MG tablet, Take 1 tablet by mouth Daily., Disp: 90 tablet, Rfl: 3    memantine (NAMENDA) 10 MG tablet, Take 1 tablet by mouth 2 (Two) Times a Day., Disp: 180 tablet, Rfl: 1    metFORMIN (GLUCOPHAGE) 1000 MG tablet, Take 1 tablet by mouth 2 (Two) Times a Day., Disp: , Rfl:     methenamine (HIPREX) 1 g tablet, Take 1 tablet by mouth 2 (Two) Times a Day With Meals., Disp: 180 tablet, Rfl: 1    metoprolol tartrate (LOPRESSOR) 100 MG tablet, Take 1 tablet by mouth 2 (Two) Times a Day., Disp: 180 tablet, Rfl: 1    multivitamin with minerals (MULTIVITAMIN MEN 50+ PO), Take 1 tablet by mouth Every Night. Centrum Sliver, Disp: , Rfl:     omeprazole (priLOSEC) 20 MG capsule, Take 1 capsule by mouth 2 (Two) Times a Day., Disp: 180 capsule, Rfl: 1    potassium chloride 10 MEQ CR tablet, Take 2 tablets by mouth Daily., Disp: 180 tablet, Rfl: 3    pravastatin (PRAVACHOL) 40 MG tablet, Take 1 tablet by mouth Daily., Disp: 90 tablet, Rfl: 1    Probiotic Product (PROBIOTIC  ADVANCED PO), Take 1 tablet by mouth 2 (Two) Times a Day., Disp: , Rfl:     sertraline (ZOLOFT) 50 MG tablet, Take 1 tablet by mouth Daily., Disp: 90 tablet, Rfl: 1    tiotropium bromide-olodaterol (STIOLTO RESPIMAT) 2.5-2.5 MCG/ACT aerosol solution inhaler, Inhale 2 puffs Daily. 2 inh once a day, Disp: 3 each, Rfl: 3    Allergies:   Allergies   Allergen Reactions    Xarelto [Rivaroxaban] GI Bleeding     GI bleed    Sglt2 Inhibitors Other (See Comments)     Recurrent UTI    Penicillins Hives     Has tolerated cefepime, ceftriaxone, cefazolin, cefdinir, cefuroxime, cephalexin       Objective     Physical Exam: Please see above  Vital Signs: There were no vitals filed for this visit.  There is no height or weight on file to calculate BMI.          Physical Exam  Vitals and nursing note reviewed.   HENT:      Head: Normocephalic.      Nose: Nose normal.   Eyes:      Pupils: Pupils are equal, round, and reactive to light.   Cardiovascular:      Rate and Rhythm: Normal rate and regular rhythm.   Pulmonary:      Effort: Pulmonary effort is normal.      Breath sounds: Normal breath sounds.   Abdominal:      General: Bowel sounds are normal.   Musculoskeletal:         General: Normal range of motion.      Cervical back: Normal range of motion.   Skin:     General: Skin is warm and dry.      Coloration: Skin is pale.   Neurological:      General: No focal deficit present.      Mental Status: He is alert and oriented to person, place, and time.   Psychiatric:         Mood and Affect: Mood normal.         Procedures    Results:   Labs:   Hemoglobin A1C   Date Value Ref Range Status   05/07/2024 5.90 (H) 4.80 - 5.60 % Final     TSH   Date Value Ref Range Status   05/07/2024 3.690 0.270 - 4.200 uIU/mL Final        POCT Results (if applicable):   Results for orders placed or performed during the hospital encounter of 11/06/24   Comprehensive Metabolic Panel    Collection Time: 11/06/24  4:00 AM    Specimen: Blood   Result Value  Ref Range    Glucose 111 (H) 65 - 99 mg/dL    BUN 22 8 - 23 mg/dL    Creatinine 0.70 (L) 0.76 - 1.27 mg/dL    Sodium 137 136 - 145 mmol/L    Potassium 3.6 3.5 - 5.2 mmol/L    Chloride 101 98 - 107 mmol/L    CO2 29.8 (H) 22.0 - 29.0 mmol/L    Calcium 8.3 (L) 8.6 - 10.5 mg/dL    Total Protein 5.8 (L) 6.0 - 8.5 g/dL    Albumin 3.0 (L) 3.5 - 5.2 g/dL    ALT (SGPT) 12 1 - 41 U/L    AST (SGOT) 31 1 - 40 U/L    Alkaline Phosphatase 170 (H) 39 - 117 U/L    Total Bilirubin 0.9 0.0 - 1.2 mg/dL    Globulin 2.8 gm/dL    A/G Ratio 1.1 g/dL    BUN/Creatinine Ratio 31.4 (H) 7.0 - 25.0    Anion Gap 6.2 5.0 - 15.0 mmol/L    eGFR 88.6 >60.0 mL/min/1.73   aPTT    Collection Time: 11/06/24  4:00 AM    Specimen: Blood   Result Value Ref Range    PTT 31.5 22.0 - 39.0 seconds   Protime-INR    Collection Time: 11/06/24  4:00 AM    Specimen: Blood   Result Value Ref Range    Protime 17.6 (H) 12.2 - 14.5 Seconds    INR 1.38 (H) 0.89 - 1.12   CBC Auto Differential    Collection Time: 11/06/24  4:00 AM    Specimen: Blood   Result Value Ref Range    WBC 7.72 3.40 - 10.80 10*3/mm3    RBC 3.09 (L) 4.14 - 5.80 10*6/mm3    Hemoglobin 10.0 (L) 13.0 - 17.7 g/dL    Hematocrit 31.1 (L) 37.5 - 51.0 %    .6 (H) 79.0 - 97.0 fL    MCH 32.4 26.6 - 33.0 pg    MCHC 32.2 31.5 - 35.7 g/dL    RDW 16.6 (H) 12.3 - 15.4 %    RDW-SD 61.5 (H) 37.0 - 54.0 fl    MPV 10.6 6.0 - 12.0 fL    Platelets 172 140 - 450 10*3/mm3    Neutrophil % 69.1 42.7 - 76.0 %    Lymphocyte % 17.0 (L) 19.6 - 45.3 %    Monocyte % 8.2 5.0 - 12.0 %    Eosinophil % 4.9 0.3 - 6.2 %    Basophil % 0.5 0.0 - 1.5 %    Immature Grans % 0.3 0.0 - 0.5 %    Neutrophils, Absolute 5.34 1.70 - 7.00 10*3/mm3    Lymphocytes, Absolute 1.31 0.70 - 3.10 10*3/mm3    Monocytes, Absolute 0.63 0.10 - 0.90 10*3/mm3    Eosinophils, Absolute 0.38 0.00 - 0.40 10*3/mm3    Basophils, Absolute 0.04 0.00 - 0.20 10*3/mm3    Immature Grans, Absolute 0.02 0.00 - 0.05 10*3/mm3         Assessment / Plan       Assessment/Plan:   Diagnoses and all orders for this visit:    1. Bleeding from the nose (Primary)  -     CBC Auto Differential; Future  -     Comprehensive Metabolic Panel; Future  -     CBC Auto Differential  -     Comprehensive Metabolic Panel          Follow Up:   Return in about 1 month (around 12/18/2024) for Recheck.      BERYL Back  [facility]   Electronically signed by BERYL Back, 11/18/24, 10:04 AM EST.

## 2024-11-21 ENCOUNTER — HOSPITAL ENCOUNTER (OUTPATIENT)
Dept: CARDIOLOGY | Facility: HOSPITAL | Age: 89
Discharge: HOME | End: 2024-11-21
Admitting: NURSE PRACTITIONER
Payer: MEDICARE

## 2024-11-21 VITALS
DIASTOLIC BLOOD PRESSURE: 80 MMHG | OXYGEN SATURATION: 98 % | HEART RATE: 114 BPM | RESPIRATION RATE: 12 BRPM | SYSTOLIC BLOOD PRESSURE: 108 MMHG

## 2024-11-21 DIAGNOSIS — Z95.818 PRESENCE OF WATCHMAN LEFT ATRIAL APPENDAGE CLOSURE DEVICE: ICD-10-CM

## 2024-11-21 DIAGNOSIS — I48.21 PERMANENT ATRIAL FIBRILLATION: Chronic | ICD-10-CM

## 2024-11-21 PROCEDURE — 76376 3D RENDER W/INTRP POSTPROCES: CPT

## 2024-11-21 PROCEDURE — 25010000002 MIDAZOLAM PER 1 MG: Performed by: NURSE PRACTITIONER

## 2024-11-21 PROCEDURE — 93325 DOPPLER ECHO COLOR FLOW MAPG: CPT

## 2024-11-21 PROCEDURE — 25010000002 FENTANYL CITRATE (PF) 50 MCG/ML SOLUTION: Performed by: NURSE PRACTITIONER

## 2024-11-21 PROCEDURE — 93321 DOPPLER ECHO F-UP/LMTD STD: CPT

## 2024-11-21 RX ORDER — FENTANYL CITRATE 50 UG/ML
INJECTION, SOLUTION INTRAMUSCULAR; INTRAVENOUS
Status: COMPLETED | OUTPATIENT
Start: 2024-11-21 | End: 2024-11-21

## 2024-11-21 RX ORDER — MIDAZOLAM HYDROCHLORIDE 1 MG/ML
INJECTION, SOLUTION INTRAMUSCULAR; INTRAVENOUS
Status: COMPLETED | OUTPATIENT
Start: 2024-11-21 | End: 2024-11-21

## 2024-11-21 RX ADMIN — MIDAZOLAM 1 MG: 1 INJECTION INTRAMUSCULAR; INTRAVENOUS at 14:07

## 2024-11-21 RX ADMIN — FENTANYL CITRATE 25 MCG: 50 INJECTION, SOLUTION INTRAMUSCULAR; INTRAVENOUS at 14:04

## 2024-11-21 RX ADMIN — FENTANYL CITRATE 25 MCG: 50 INJECTION, SOLUTION INTRAMUSCULAR; INTRAVENOUS at 14:07

## 2024-11-21 RX ADMIN — MIDAZOLAM 1 MG: 1 INJECTION INTRAMUSCULAR; INTRAVENOUS at 14:03

## 2024-11-21 NOTE — INTERVAL H&P NOTE
Pre-procedure report  Cardiovascular Laboratory  [facility]    Patient:  Darien Camarena  :  1935  PCP:  Pito Way MD  PHONE:  [phone]    DATE: 2024    Chief Complaint: 1 year FARHAD status post Watchman device implant    FARHAD (2024): LVEF 40%.  27 mm Watchman device well-seated.  No thrombus or periprosthetic flow.     Limited TTE (2024): LVEF 48%    Stress test within last 6 months:   no    Previous cardiac catheterization:  yes  Cardiac catheterization (2022): Mild CAD. Normal LV filling pressure     Allergies:     IV contrast allergy:  no  Allergies   Allergen Reactions    Sglt2 Inhibitors Other (See Comments)     Recurrent UTI    Xarelto [Rivaroxaban] GI Bleeding     GI bleed    Penicillins Hives     Has tolerated cefepime, ceftriaxone, cefazolin, cefdinir, cefuroxime, cephalexin       MEDICATIONS:  Prior to Admission medications    Medication Sig Start Date End Date Taking? Authorizing Provider   albuterol sulfate  (90 Base) MCG/ACT inhaler Inhale 2 puffs Every 4 (Four) Hours As Needed for Wheezing. 24   Kimberly London APRN   allopurinol (ZYLOPRIM) 300 MG tablet Take 1 tablet by mouth Daily. 10/27/24   Pito Way MD   ascorbic acid (VITAMIN C) 1000 MG tablet Take 1 tablet by mouth 2 (Two) Times a Day.    ProviderManisha MD   cephalexin (KEFLEX) 500 MG capsule Take 1 capsule by mouth 2 (Two) Times a Day. 24   Oneal Beckwith MD   Coenzyme Q10 300 MG capsule Take 1 capsule by mouth Every Night.    Manisha West MD   docusate sodium (COLACE) 100 MG capsule Take 2 capsules by mouth At Night As Needed for Constipation.    Manisha West MD   donepezil (ARICEPT) 10 MG tablet Take 1 tablet by mouth Every Night. 10/27/24   Pito Way MD   Ferrous Gluconate (IRON) 240 (27 FE) MG tablet Take 1 tablet by mouth Daily. 14   Manisha West MD   finasteride (PROSCAR) 5 MG tablet Take 1  tablet by mouth every night at bedtime. 10/27/24   Pito Way MD   furosemide (LASIX) 40 MG tablet Take 1 tablet by mouth Daily. 10/29/24   Titus Oliveros IV, MD   memantine (NAMENDA) 10 MG tablet Take 1 tablet by mouth 2 (Two) Times a Day. 10/27/24   Pito Way MD   metFORMIN (GLUCOPHAGE) 1000 MG tablet Take 1 tablet by mouth 2 (Two) Times a Day. 10/29/24   Titus Oliveros IV, MD   methenamine (HIPREX) 1 g tablet Take 1 tablet by mouth 2 (Two) Times a Day With Meals. 10/27/24   Pito Way MD   metoprolol tartrate (LOPRESSOR) 100 MG tablet Take 1 tablet by mouth 2 (Two) Times a Day. 10/29/24   Titus Oliveros IV, MD   multivitamin with minerals (MULTIVITAMIN MEN 50+ PO) Take 1 tablet by mouth Every Night. Centrum Sliver    ProviderManisha MD   omeprazole (priLOSEC) 20 MG capsule Take 1 capsule by mouth 2 (Two) Times a Day. 10/27/24   Pito Way MD   potassium chloride 10 MEQ CR tablet Take 2 tablets by mouth Daily. 10/29/24   Titus Oliveros IV, MD   pravastatin (PRAVACHOL) 40 MG tablet Take 1 tablet by mouth Daily. 10/29/24   Titus Oliveros IV, MD   Probiotic Product (PROBIOTIC ADVANCED PO) Take 1 tablet by mouth 2 (Two) Times a Day.    ProviderManisha MD   sertraline (ZOLOFT) 50 MG tablet Take 1 tablet by mouth Daily. 10/27/24   Pito Way MD   tiotropium bromide-olodaterol (STIOLTO RESPIMAT) 2.5-2.5 MCG/ACT aerosol solution inhaler Inhale 2 puffs Daily. 2 inh once a day 6/13/24   Kimberly London APRN       Past medical & surgical history, social and family history reviewed in the electronic medical record.    Physical Exam:    Vitals: There were no vitals filed for this visit. There were no vitals filed for this visit.There is no height or weight on file to calculate BMI.  Bp 108/71, HR 110bpm, RR 14, 92% on RA  GENERAL: No apparent distress.  No significant changes since last exam.  CHEST: Clear to  auscultation bilaterally no stridor no wheeze.  CV: S1, S2, Irregular and tachycardic rate without Murmurs, Rubs or Gallops  EXTREMITIES: No edema.        Results from last 7 days   Lab Units 11/18/24  1005   SODIUM mmol/L 142   POTASSIUM mmol/L 4.2   CHLORIDE mmol/L 100   CO2 mmol/L 32.0*   BUN mg/dL 19   CREATININE mg/dL 1.14   GLUCOSE mg/dL 61*   CALCIUM mg/dL 9.2     Results from last 7 days   Lab Units 11/18/24  1005   WBC 10*3/mm3 8.02   HEMOGLOBIN g/dL 11.4*   HEMATOCRIT % 34.7*   PLATELETS 10*3/mm3 297     Lab Results   Component Value Date    TRIG 83 05/07/2024    HDL 26 (L) 05/07/2024    AST 43 (H) 11/18/2024    ALT 28 11/18/2024           IMPRESSION:  Patient is here for 1 year FARHAD status post Watchman device implant.  FARHAD procedure, risks, complications discussed with the patient and he is agreeable to proceed.  Patient has not been on aspirin for around a week and a half due to recurrent epistaxis.  He is also followed by ENT.  His last episode of epistaxis was 2 days ago.  Patient has upper and lower dentures.    PLAN:  Procedure to perform: FARHAD  Follow-up with Dr. Mcdaniel for previously scheduled appointment 12/5/2024    Electronically signed by BERYL Montaño, 11/21/24, 1:43 PM EST.

## 2024-11-22 ENCOUNTER — OFFICE VISIT (OUTPATIENT)
Dept: SLEEP MEDICINE | Facility: CLINIC | Age: 89
End: 2024-11-22
Payer: MEDICARE

## 2024-11-22 VITALS
SYSTOLIC BLOOD PRESSURE: 128 MMHG | BODY MASS INDEX: 26.49 KG/M2 | HEART RATE: 96 BPM | WEIGHT: 213 LBS | TEMPERATURE: 97.5 F | HEIGHT: 75 IN | OXYGEN SATURATION: 96 % | DIASTOLIC BLOOD PRESSURE: 80 MMHG

## 2024-11-22 DIAGNOSIS — R04.0 NOSEBLEED: ICD-10-CM

## 2024-11-22 DIAGNOSIS — G47.33 OSA (OBSTRUCTIVE SLEEP APNEA): Primary | ICD-10-CM

## 2024-11-22 LAB
ASCENDING AORTA: 3.5 CM
BH CV ECHO MEAS - RVSP: 38 MMHG
BH CV ECHO MEAS - TR MAX PG: 30 MMHG

## 2024-11-22 PROCEDURE — 99213 OFFICE O/P EST LOW 20 MIN: CPT | Performed by: NURSE PRACTITIONER

## 2024-11-22 NOTE — PROGRESS NOTES
Chief Complaint:   Chief Complaint   Patient presents with    Follow-up    Sleep Apnea       HPI:    Darien Camarena is a 89 y.o. male here for follow-up of sleep apnea.  Patient was last seen 8/22/2024 his AHI was 18.5 and we did increase settings 8-20.  Patient has used CPAP 79 out of the last 90 days with a greater than 4-hour use of 39%.  Patient and daughter state he has been having severe nosebleeds and  ENT has instructed him to not wear CPAP for a while until these are under control.  He does get 6 to 8 hours of sleep nightly.  He does have an [place] score of 21/24.    Patient's daughter who is with patient today is going to speak with the other daughter to see if this is something they wish to continue for their father.  They will certainly let me know.  Patient and daughter do understand the consequences of untreated sleep apnea.    Current medications are:   Current Outpatient Medications:     albuterol sulfate  (90 Base) MCG/ACT inhaler, Inhale 2 puffs Every 4 (Four) Hours As Needed for Wheezing., Disp: 54 g, Rfl: 1    allopurinol (ZYLOPRIM) 300 MG tablet, Take 1 tablet by mouth Daily., Disp: 90 tablet, Rfl: 1    ascorbic acid (VITAMIN C) 1000 MG tablet, Take 1 tablet by mouth 2 (Two) Times a Day., Disp: , Rfl:     cephalexin (KEFLEX) 500 MG capsule, Take 1 capsule by mouth 2 (Two) Times a Day., Disp: 10 capsule, Rfl: 0    Coenzyme Q10 300 MG capsule, Take 1 capsule by mouth Every Night., Disp: , Rfl:     docusate sodium (COLACE) 100 MG capsule, Take 2 capsules by mouth At Night As Needed for Constipation., Disp: , Rfl:     donepezil (ARICEPT) 10 MG tablet, Take 1 tablet by mouth Every Night., Disp: 90 tablet, Rfl: 1    Ferrous Gluconate (IRON) 240 (27 FE) MG tablet, Take 1 tablet by mouth Daily., Disp: , Rfl:     finasteride (PROSCAR) 5 MG tablet, Take 1 tablet by mouth every night at bedtime., Disp: 90 tablet, Rfl: 1    furosemide (LASIX) 40 MG tablet, Take 1 tablet by mouth Daily., Disp: 90  tablet, Rfl: 3    memantine (NAMENDA) 10 MG tablet, Take 1 tablet by mouth 2 (Two) Times a Day., Disp: 180 tablet, Rfl: 1    metFORMIN (GLUCOPHAGE) 1000 MG tablet, Take 1 tablet by mouth 2 (Two) Times a Day., Disp: , Rfl:     methenamine (HIPREX) 1 g tablet, Take 1 tablet by mouth 2 (Two) Times a Day With Meals., Disp: 180 tablet, Rfl: 1    metoprolol tartrate (LOPRESSOR) 100 MG tablet, Take 1 tablet by mouth 2 (Two) Times a Day., Disp: 180 tablet, Rfl: 1    multivitamin with minerals (MULTIVITAMIN MEN 50+ PO), Take 1 tablet by mouth Every Night. Centrum Sliver, Disp: , Rfl:     omeprazole (priLOSEC) 20 MG capsule, Take 1 capsule by mouth 2 (Two) Times a Day., Disp: 180 capsule, Rfl: 1    potassium chloride 10 MEQ CR tablet, Take 2 tablets by mouth Daily., Disp: 180 tablet, Rfl: 3    pravastatin (PRAVACHOL) 40 MG tablet, Take 1 tablet by mouth Daily., Disp: 90 tablet, Rfl: 1    Probiotic Product (PROBIOTIC ADVANCED PO), Take 1 tablet by mouth 2 (Two) Times a Day., Disp: , Rfl:     sertraline (ZOLOFT) 50 MG tablet, Take 1 tablet by mouth Daily., Disp: 90 tablet, Rfl: 1    tiotropium bromide-olodaterol (STIOLTO RESPIMAT) 2.5-2.5 MCG/ACT aerosol solution inhaler, Inhale 2 puffs Daily. 2 inh once a day, Disp: 3 each, Rfl: 3  No current facility-administered medications for this visit..      The patient's relevant past medical, surgical, family and social history were reviewed and updated in Epic as appropriate.       Review of Systems   Constitutional:  Positive for fatigue.   HENT:  Positive for nosebleeds.    Eyes:  Positive for visual disturbance.   Respiratory:  Positive for apnea and shortness of breath.    Cardiovascular:  Positive for palpitations.   Gastrointestinal:         Heartburn   Musculoskeletal:  Positive for arthralgias, back pain and gait problem.   Neurological:         Slight memory loss   Psychiatric/Behavioral:  Positive for sleep disturbance.    All other systems reviewed and are  negative.        Objective:    Physical Exam  Constitutional:       Appearance: Normal appearance.   HENT:      Head: Normocephalic and atraumatic.   Cardiovascular:      Rate and Rhythm: Rhythm irregular.   Pulmonary:      Effort: Pulmonary effort is normal.      Breath sounds: Normal breath sounds.   Skin:     General: Skin is warm and dry.   Neurological:      Mental Status: He is alert and oriented to person, place, and time.   Psychiatric:         Mood and Affect: Mood normal.         Behavior: Behavior normal.         Thought Content: Thought content normal.         Judgment: Judgment normal.         CPAP Report    79/90 days of use  Greater than 4-hour use 39%  Setting 8-20  95th percentile pressure 9.8  The patient continues to use and benefit from CPAP therapy.    ASSESSMENT/PLAN    Diagnoses and all orders for this visit:    1. STEVEN (obstructive sleep apnea) (Primary)    2. Nosebleed      Patient is currently working with his 2 daughters and they will decide if they wish to continue CPAP therapy.  They do understand the consequences of untreated sleep apnea.  He is now on hold per ENT due to severe nosebleeds.  I will see him back in 4 months and they will let me know should they need me before then.    Signed by  BERYL Allan    November 22, 2024      CC: Pito Way MD         No ref. provider found

## 2024-12-05 ENCOUNTER — TELEPHONE (OUTPATIENT)
Dept: INTERNAL MEDICINE | Facility: CLINIC | Age: 89
End: 2024-12-05

## 2024-12-05 ENCOUNTER — APPOINTMENT (OUTPATIENT)
Dept: GENERAL RADIOLOGY | Facility: HOSPITAL | Age: 89
End: 2024-12-05
Payer: MEDICARE

## 2024-12-05 ENCOUNTER — HOSPITAL ENCOUNTER (INPATIENT)
Facility: HOSPITAL | Age: 89
LOS: 13 days | Discharge: NURSING, REHAB OR LONG-TERM CARE | End: 2024-12-18
Attending: EMERGENCY MEDICINE | Admitting: INTERNAL MEDICINE
Payer: MEDICARE

## 2024-12-05 ENCOUNTER — OFFICE VISIT (OUTPATIENT)
Dept: UROLOGY | Facility: CLINIC | Age: 89
End: 2024-12-05
Payer: MEDICARE

## 2024-12-05 VITALS
HEART RATE: 67 BPM | HEIGHT: 75 IN | BODY MASS INDEX: 26.49 KG/M2 | SYSTOLIC BLOOD PRESSURE: 92 MMHG | WEIGHT: 213 LBS | DIASTOLIC BLOOD PRESSURE: 60 MMHG

## 2024-12-05 DIAGNOSIS — R42 DIZZINESS: ICD-10-CM

## 2024-12-05 DIAGNOSIS — E78.5 HYPERLIPIDEMIA LDL GOAL <100: Chronic | ICD-10-CM

## 2024-12-05 DIAGNOSIS — N39.0 RECURRENT UTI: Primary | ICD-10-CM

## 2024-12-05 DIAGNOSIS — R33.9 URINARY RETENTION: Primary | ICD-10-CM

## 2024-12-05 DIAGNOSIS — I95.1 ORTHOSTASIS: Primary | ICD-10-CM

## 2024-12-05 DIAGNOSIS — R33.9 ACUTE ON CHRONIC URINARY RETENTION: ICD-10-CM

## 2024-12-05 DIAGNOSIS — R42 LIGHT HEADEDNESS: ICD-10-CM

## 2024-12-05 DIAGNOSIS — I48.20 CHRONIC ATRIAL FIBRILLATION WITH RAPID VENTRICULAR RESPONSE: ICD-10-CM

## 2024-12-05 DIAGNOSIS — N39.0 RECURRENT UTI: ICD-10-CM

## 2024-12-05 PROBLEM — I48.91 A-FIB: Status: ACTIVE | Noted: 2024-12-05

## 2024-12-05 LAB
ALBUMIN SERPL-MCNC: 3.5 G/DL (ref 3.5–5.2)
ALBUMIN/GLOB SERPL: 0.8 G/DL
ALP SERPL-CCNC: 221 U/L (ref 39–117)
ALT SERPL W P-5'-P-CCNC: 18 U/L (ref 1–41)
ANION GAP SERPL CALCULATED.3IONS-SCNC: 13 MMOL/L (ref 5–15)
AST SERPL-CCNC: 41 U/L (ref 1–40)
BACTERIA UR QL AUTO: ABNORMAL /HPF
BASOPHILS # BLD AUTO: 0.07 10*3/MM3 (ref 0–0.2)
BASOPHILS NFR BLD AUTO: 0.7 % (ref 0–1.5)
BILIRUB BLD-MCNC: NEGATIVE MG/DL
BILIRUB SERPL-MCNC: 0.9 MG/DL (ref 0–1.2)
BILIRUB UR QL STRIP: NEGATIVE
BUN SERPL-MCNC: 40 MG/DL (ref 8–23)
BUN/CREAT SERPL: 33.6 (ref 7–25)
CALCIUM SPEC-SCNC: 9.7 MG/DL (ref 8.6–10.5)
CHLORIDE SERPL-SCNC: 100 MMOL/L (ref 98–107)
CLARITY UR: CLEAR
CLARITY, POC: ABNORMAL
CO2 SERPL-SCNC: 29 MMOL/L (ref 22–29)
COLOR UR: YELLOW
COLOR UR: YELLOW
CREAT SERPL-MCNC: 1.19 MG/DL (ref 0.76–1.27)
D-LACTATE SERPL-SCNC: 2.9 MMOL/L (ref 0.5–2)
D-LACTATE SERPL-SCNC: 3 MMOL/L (ref 0.5–2)
DEPRECATED RDW RBC AUTO: 55 FL (ref 37–54)
EGFRCR SERPLBLD CKD-EPI 2021: 58.8 ML/MIN/1.73
EOSINOPHIL # BLD AUTO: 0.25 10*3/MM3 (ref 0–0.4)
EOSINOPHIL NFR BLD AUTO: 2.6 % (ref 0.3–6.2)
ERYTHROCYTE [DISTWIDTH] IN BLOOD BY AUTOMATED COUNT: 14.9 % (ref 12.3–15.4)
EXPIRATION DATE: ABNORMAL
GLOBULIN UR ELPH-MCNC: 4.2 GM/DL
GLUCOSE BLDC GLUCOMTR-MCNC: 103 MG/DL (ref 70–130)
GLUCOSE BLDC GLUCOMTR-MCNC: 61 MG/DL (ref 70–130)
GLUCOSE SERPL-MCNC: 106 MG/DL (ref 65–99)
GLUCOSE UR STRIP-MCNC: NEGATIVE MG/DL
GLUCOSE UR STRIP-MCNC: NEGATIVE MG/DL
HCT VFR BLD AUTO: 43.7 % (ref 37.5–51)
HGB BLD-MCNC: 13.6 G/DL (ref 13–17.7)
HGB UR QL STRIP.AUTO: NEGATIVE
HOLD SPECIMEN: NORMAL
HYALINE CASTS UR QL AUTO: ABNORMAL /LPF
IMM GRANULOCYTES # BLD AUTO: 0.03 10*3/MM3 (ref 0–0.05)
IMM GRANULOCYTES NFR BLD AUTO: 0.3 % (ref 0–0.5)
KETONES UR QL STRIP: NEGATIVE
KETONES UR QL: NEGATIVE
LEUKOCYTE EST, POC: ABNORMAL
LEUKOCYTE ESTERASE UR QL STRIP.AUTO: ABNORMAL
LYMPHOCYTES # BLD AUTO: 1.47 10*3/MM3 (ref 0.7–3.1)
LYMPHOCYTES NFR BLD AUTO: 15.2 % (ref 19.6–45.3)
Lab: ABNORMAL
MAGNESIUM SERPL-MCNC: 1.7 MG/DL (ref 1.6–2.4)
MCH RBC QN AUTO: 31.1 PG (ref 26.6–33)
MCHC RBC AUTO-ENTMCNC: 31.1 G/DL (ref 31.5–35.7)
MCV RBC AUTO: 100 FL (ref 79–97)
MONOCYTES # BLD AUTO: 0.48 10*3/MM3 (ref 0.1–0.9)
MONOCYTES NFR BLD AUTO: 5 % (ref 5–12)
NEUTROPHILS NFR BLD AUTO: 7.39 10*3/MM3 (ref 1.7–7)
NEUTROPHILS NFR BLD AUTO: 76.2 % (ref 42.7–76)
NITRITE UR QL STRIP: POSITIVE
NITRITE UR-MCNC: NEGATIVE MG/ML
NRBC BLD AUTO-RTO: 0 /100 WBC (ref 0–0.2)
PH UR STRIP.AUTO: 5.5 [PH] (ref 5–8)
PH UR: 6 [PH] (ref 5–8)
PLATELET # BLD AUTO: 252 10*3/MM3 (ref 140–450)
PMV BLD AUTO: 11 FL (ref 6–12)
POTASSIUM SERPL-SCNC: 5 MMOL/L (ref 3.5–5.2)
PROCALCITONIN SERPL-MCNC: 0.05 NG/ML (ref 0–0.25)
PROT SERPL-MCNC: 7.7 G/DL (ref 6–8.5)
PROT UR QL STRIP: NEGATIVE
PROT UR STRIP-MCNC: NEGATIVE MG/DL
QT INTERVAL: 354 MS
QTC INTERVAL: 470 MS
RBC # BLD AUTO: 4.37 10*6/MM3 (ref 4.14–5.8)
RBC # UR STRIP: ABNORMAL /HPF
RBC # UR STRIP: ABNORMAL /UL
REF LAB TEST METHOD: ABNORMAL
SODIUM SERPL-SCNC: 142 MMOL/L (ref 136–145)
SP GR UR STRIP: 1.01 (ref 1–1.03)
SP GR UR: 1.02 (ref 1–1.03)
SQUAMOUS #/AREA URNS HPF: ABNORMAL /HPF
TROPONIN T SERPL HS-MCNC: 36 NG/L
UROBILINOGEN UR QL STRIP: ABNORMAL
UROBILINOGEN UR QL: NORMAL
WBC # UR STRIP: ABNORMAL /HPF
WBC NRBC COR # BLD AUTO: 9.69 10*3/MM3 (ref 3.4–10.8)
WHOLE BLOOD HOLD COAG: NORMAL
WHOLE BLOOD HOLD SPECIMEN: NORMAL

## 2024-12-05 PROCEDURE — 87186 SC STD MICRODIL/AGAR DIL: CPT | Performed by: NURSE PRACTITIONER

## 2024-12-05 PROCEDURE — 99222 1ST HOSP IP/OBS MODERATE 55: CPT | Performed by: STUDENT IN AN ORGANIZED HEALTH CARE EDUCATION/TRAINING PROGRAM

## 2024-12-05 PROCEDURE — 85025 COMPLETE CBC W/AUTO DIFF WBC: CPT | Performed by: EMERGENCY MEDICINE

## 2024-12-05 PROCEDURE — 84484 ASSAY OF TROPONIN QUANT: CPT | Performed by: EMERGENCY MEDICINE

## 2024-12-05 PROCEDURE — 25010000002 CEFEPIME PER 500 MG: Performed by: PHYSICIAN ASSISTANT

## 2024-12-05 PROCEDURE — 87086 URINE CULTURE/COLONY COUNT: CPT | Performed by: PHYSICIAN ASSISTANT

## 2024-12-05 PROCEDURE — 99285 EMERGENCY DEPT VISIT HI MDM: CPT

## 2024-12-05 PROCEDURE — 25010000002 VANCOMYCIN 10 G RECONSTITUTED SOLUTION: Performed by: PHYSICIAN ASSISTANT

## 2024-12-05 PROCEDURE — 51798 US URINE CAPACITY MEASURE: CPT

## 2024-12-05 PROCEDURE — 84145 PROCALCITONIN (PCT): CPT | Performed by: PHYSICIAN ASSISTANT

## 2024-12-05 PROCEDURE — 81001 URINALYSIS AUTO W/SCOPE: CPT | Performed by: PHYSICIAN ASSISTANT

## 2024-12-05 PROCEDURE — 83605 ASSAY OF LACTIC ACID: CPT | Performed by: PHYSICIAN ASSISTANT

## 2024-12-05 PROCEDURE — 71045 X-RAY EXAM CHEST 1 VIEW: CPT

## 2024-12-05 PROCEDURE — 87040 BLOOD CULTURE FOR BACTERIA: CPT | Performed by: PHYSICIAN ASSISTANT

## 2024-12-05 PROCEDURE — 80053 COMPREHEN METABOLIC PANEL: CPT | Performed by: EMERGENCY MEDICINE

## 2024-12-05 PROCEDURE — 87086 URINE CULTURE/COLONY COUNT: CPT | Performed by: NURSE PRACTITIONER

## 2024-12-05 PROCEDURE — 25810000003 SODIUM CHLORIDE 0.9 % SOLUTION: Performed by: INTERNAL MEDICINE

## 2024-12-05 PROCEDURE — 87077 CULTURE AEROBIC IDENTIFY: CPT | Performed by: NURSE PRACTITIONER

## 2024-12-05 PROCEDURE — 83735 ASSAY OF MAGNESIUM: CPT | Performed by: EMERGENCY MEDICINE

## 2024-12-05 PROCEDURE — P9612 CATHETERIZE FOR URINE SPEC: HCPCS

## 2024-12-05 PROCEDURE — 25810000003 SODIUM CHLORIDE 0.9 % SOLUTION: Performed by: PHYSICIAN ASSISTANT

## 2024-12-05 PROCEDURE — 93005 ELECTROCARDIOGRAM TRACING: CPT | Performed by: EMERGENCY MEDICINE

## 2024-12-05 PROCEDURE — 36415 COLL VENOUS BLD VENIPUNCTURE: CPT

## 2024-12-05 PROCEDURE — 99223 1ST HOSP IP/OBS HIGH 75: CPT | Performed by: INTERNAL MEDICINE

## 2024-12-05 PROCEDURE — 82948 REAGENT STRIP/BLOOD GLUCOSE: CPT

## 2024-12-05 RX ORDER — BISACODYL 5 MG/1
5 TABLET, DELAYED RELEASE ORAL DAILY PRN
Status: DISCONTINUED | OUTPATIENT
Start: 2024-12-05 | End: 2024-12-18 | Stop reason: HOSPADM

## 2024-12-05 RX ORDER — ONDANSETRON 2 MG/ML
4 INJECTION INTRAMUSCULAR; INTRAVENOUS EVERY 6 HOURS PRN
Status: DISCONTINUED | OUTPATIENT
Start: 2024-12-05 | End: 2024-12-18 | Stop reason: HOSPADM

## 2024-12-05 RX ORDER — FINASTERIDE 5 MG/1
5 TABLET, FILM COATED ORAL DAILY
Status: DISCONTINUED | OUTPATIENT
Start: 2024-12-05 | End: 2024-12-18 | Stop reason: HOSPADM

## 2024-12-05 RX ORDER — SODIUM CHLORIDE 0.9 % (FLUSH) 0.9 %
10 SYRINGE (ML) INJECTION AS NEEDED
Status: DISCONTINUED | OUTPATIENT
Start: 2024-12-05 | End: 2024-12-18 | Stop reason: HOSPADM

## 2024-12-05 RX ORDER — AMOXICILLIN 250 MG
2 CAPSULE ORAL 2 TIMES DAILY PRN
Status: DISCONTINUED | OUTPATIENT
Start: 2024-12-05 | End: 2024-12-18 | Stop reason: HOSPADM

## 2024-12-05 RX ORDER — DILTIAZEM HYDROCHLORIDE 5 MG/ML
10 INJECTION INTRAVENOUS ONCE
Status: COMPLETED | OUTPATIENT
Start: 2024-12-05 | End: 2024-12-05

## 2024-12-05 RX ORDER — IBUPROFEN 600 MG/1
1 TABLET ORAL
Status: DISCONTINUED | OUTPATIENT
Start: 2024-12-05 | End: 2024-12-18 | Stop reason: HOSPADM

## 2024-12-05 RX ORDER — BISACODYL 10 MG
10 SUPPOSITORY, RECTAL RECTAL DAILY PRN
Status: DISCONTINUED | OUTPATIENT
Start: 2024-12-05 | End: 2024-12-18 | Stop reason: HOSPADM

## 2024-12-05 RX ORDER — INSULIN LISPRO 100 [IU]/ML
2-7 INJECTION, SOLUTION INTRAVENOUS; SUBCUTANEOUS
Status: DISCONTINUED | OUTPATIENT
Start: 2024-12-05 | End: 2024-12-17

## 2024-12-05 RX ORDER — DONEPEZIL HYDROCHLORIDE 10 MG/1
10 TABLET, FILM COATED ORAL NIGHTLY
Status: DISCONTINUED | OUTPATIENT
Start: 2024-12-05 | End: 2024-12-18 | Stop reason: HOSPADM

## 2024-12-05 RX ORDER — ALBUTEROL SULFATE 90 UG/1
2 INHALANT RESPIRATORY (INHALATION) EVERY 4 HOURS PRN
Status: DISCONTINUED | OUTPATIENT
Start: 2024-12-05 | End: 2024-12-05 | Stop reason: CLARIF

## 2024-12-05 RX ORDER — NICOTINE POLACRILEX 4 MG
15 LOZENGE BUCCAL
Status: DISCONTINUED | OUTPATIENT
Start: 2024-12-05 | End: 2024-12-18 | Stop reason: HOSPADM

## 2024-12-05 RX ORDER — POLYETHYLENE GLYCOL 3350 17 G/17G
17 POWDER, FOR SOLUTION ORAL DAILY PRN
Status: DISCONTINUED | OUTPATIENT
Start: 2024-12-05 | End: 2024-12-18 | Stop reason: HOSPADM

## 2024-12-05 RX ORDER — SODIUM CHLORIDE 9 MG/ML
40 INJECTION, SOLUTION INTRAVENOUS AS NEEDED
Status: DISCONTINUED | OUTPATIENT
Start: 2024-12-05 | End: 2024-12-18 | Stop reason: HOSPADM

## 2024-12-05 RX ORDER — SODIUM CHLORIDE 0.9 % (FLUSH) 0.9 %
10 SYRINGE (ML) INJECTION EVERY 12 HOURS SCHEDULED
Status: DISCONTINUED | OUTPATIENT
Start: 2024-12-05 | End: 2024-12-18 | Stop reason: HOSPADM

## 2024-12-05 RX ORDER — DEXTROSE MONOHYDRATE 25 G/50ML
25 INJECTION, SOLUTION INTRAVENOUS
Status: DISCONTINUED | OUTPATIENT
Start: 2024-12-05 | End: 2024-12-18 | Stop reason: HOSPADM

## 2024-12-05 RX ORDER — SODIUM CHLORIDE 9 MG/ML
75 INJECTION, SOLUTION INTRAVENOUS CONTINUOUS
Status: ACTIVE | OUTPATIENT
Start: 2024-12-05 | End: 2024-12-06

## 2024-12-05 RX ORDER — ALBUTEROL SULFATE 0.83 MG/ML
2.5 SOLUTION RESPIRATORY (INHALATION) EVERY 4 HOURS PRN
Status: DISCONTINUED | OUTPATIENT
Start: 2024-12-05 | End: 2024-12-18 | Stop reason: HOSPADM

## 2024-12-05 RX ORDER — PRAVASTATIN SODIUM 40 MG
40 TABLET ORAL DAILY
Status: DISCONTINUED | OUTPATIENT
Start: 2024-12-06 | End: 2024-12-18 | Stop reason: HOSPADM

## 2024-12-05 RX ORDER — PANTOPRAZOLE SODIUM 40 MG/1
40 TABLET, DELAYED RELEASE ORAL
Status: DISCONTINUED | OUTPATIENT
Start: 2024-12-06 | End: 2024-12-18 | Stop reason: HOSPADM

## 2024-12-05 RX ORDER — MEMANTINE HYDROCHLORIDE 10 MG/1
10 TABLET ORAL 2 TIMES DAILY
Status: DISCONTINUED | OUTPATIENT
Start: 2024-12-05 | End: 2024-12-18 | Stop reason: HOSPADM

## 2024-12-05 RX ADMIN — CEFEPIME HYDROCHLORIDE 1000 MG: 1 INJECTION, POWDER, FOR SOLUTION INTRAMUSCULAR; INTRAVENOUS at 18:26

## 2024-12-05 RX ADMIN — VANCOMYCIN HYDROCHLORIDE 2250 MG: 10 INJECTION, POWDER, LYOPHILIZED, FOR SOLUTION INTRAVENOUS at 15:44

## 2024-12-05 RX ADMIN — MEMANTINE 10 MG: 10 TABLET ORAL at 21:39

## 2024-12-05 RX ADMIN — SODIUM CHLORIDE 75 ML/HR: 9 INJECTION, SOLUTION INTRAVENOUS at 18:26

## 2024-12-05 RX ADMIN — SODIUM CHLORIDE 1000 ML: 9 INJECTION, SOLUTION INTRAVENOUS at 11:33

## 2024-12-05 RX ADMIN — FINASTERIDE 5 MG: 5 TABLET, FILM COATED ORAL at 17:49

## 2024-12-05 RX ADMIN — DILTIAZEM HYDROCHLORIDE 10 MG: 5 INJECTION INTRAVENOUS at 12:59

## 2024-12-05 RX ADMIN — DONEPEZIL HYDROCHLORIDE 10 MG: 10 TABLET, FILM COATED ORAL at 21:39

## 2024-12-05 NOTE — PROGRESS NOTES
Follow Up Office Visit      Patient Name: Darien Camarena  : 1935   MRN: [id number]     Chief Complaint:    Chief Complaint   Patient presents with    Urinary Retention     CIC       Referring Provider: No ref. provider found    History of Present Illness: Darien Camarena is a 89 y.o. male who presents today for follow up of urinary retention and recurrent UTI. He is performing CIC 4 times daily. He is occasionally able to void on his own due to increase in his Lasix dose. He reports intermittent difficulty performing CIC and difficulty passing his catheters. He presents today to trouble shoot passing catheter.      He is currently managed on Hiprex twice daily + vitamin C by Dr Og with ID for recurrent UTI.     He reports 1 week history of dizziness. He is hypotensive in our office today 92/60. HR stable at 67. He denies nausea/vomiting. His daughter is with him and also reports history of recent weight loss of 15 lbs in the last few weeks. He does take Metoprolol 100mg and took his dose this morning. He also takes 40mg of Lasix daily. His daughter has contacted PCP this AM but has not heard back yet.     Subjective      Review of System: Review of Systems   Genitourinary:         Urinary retention   Neurological:  Positive for dizziness.   All other systems reviewed and are negative.     I have reviewed the ROS documented by my clinical staff, I have updated appropriately and I agree. BERYL Dukes    I have reviewed and the following portions of the patient's history were updated as appropriate: past family history, past medical history, past social history, past surgical history and problem list.    Medications:     Current Outpatient Medications:     albuterol sulfate  (90 Base) MCG/ACT inhaler, Inhale 2 puffs Every 4 (Four) Hours As Needed for Wheezing., Disp: 54 g, Rfl: 1    allopurinol (ZYLOPRIM) 300 MG tablet, Take 1 tablet by mouth Daily., Disp: 90 tablet, Rfl: 1     ascorbic acid (VITAMIN C) 1000 MG tablet, Take 1 tablet by mouth 2 (Two) Times a Day., Disp: , Rfl:     cephalexin (KEFLEX) 500 MG capsule, Take 1 capsule by mouth 2 (Two) Times a Day., Disp: 10 capsule, Rfl: 0    Coenzyme Q10 300 MG capsule, Take 1 capsule by mouth Every Night., Disp: , Rfl:     docusate sodium (COLACE) 100 MG capsule, Take 2 capsules by mouth At Night As Needed for Constipation., Disp: , Rfl:     donepezil (ARICEPT) 10 MG tablet, Take 1 tablet by mouth Every Night., Disp: 90 tablet, Rfl: 1    Ferrous Gluconate (IRON) 240 (27 FE) MG tablet, Take 1 tablet by mouth Daily., Disp: , Rfl:     finasteride (PROSCAR) 5 MG tablet, Take 1 tablet by mouth every night at bedtime., Disp: 90 tablet, Rfl: 1    furosemide (LASIX) 40 MG tablet, Take 1 tablet by mouth Daily., Disp: 90 tablet, Rfl: 3    memantine (NAMENDA) 10 MG tablet, Take 1 tablet by mouth 2 (Two) Times a Day., Disp: 180 tablet, Rfl: 1    metFORMIN (GLUCOPHAGE) 1000 MG tablet, Take 1 tablet by mouth 2 (Two) Times a Day., Disp: , Rfl:     methenamine (HIPREX) 1 g tablet, Take 1 tablet by mouth 2 (Two) Times a Day With Meals., Disp: 180 tablet, Rfl: 1    metoprolol tartrate (LOPRESSOR) 100 MG tablet, Take 1 tablet by mouth 2 (Two) Times a Day., Disp: 180 tablet, Rfl: 1    multivitamin with minerals (MULTIVITAMIN MEN 50+ PO), Take 1 tablet by mouth Every Night. Centrum Sliver, Disp: , Rfl:     omeprazole (priLOSEC) 20 MG capsule, Take 1 capsule by mouth 2 (Two) Times a Day., Disp: 180 capsule, Rfl: 1    potassium chloride 10 MEQ CR tablet, Take 2 tablets by mouth Daily., Disp: 180 tablet, Rfl: 3    pravastatin (PRAVACHOL) 40 MG tablet, Take 1 tablet by mouth Daily., Disp: 90 tablet, Rfl: 1    Probiotic Product (PROBIOTIC ADVANCED PO), Take 1 tablet by mouth 2 (Two) Times a Day., Disp: , Rfl:     sertraline (ZOLOFT) 50 MG tablet, Take 1 tablet by mouth Daily., Disp: 90 tablet, Rfl: 1    tiotropium bromide-olodaterol (STIOLTO RESPIMAT) 2.5-2.5 MCG/ACT  "aerosol solution inhaler, Inhale 2 puffs Daily. 2 inh once a day, Disp: 3 each, Rfl: 3    Allergies:   Allergies   Allergen Reactions    Sglt2 Inhibitors Other (See Comments)     Recurrent UTI    Xarelto [Rivaroxaban] GI Bleeding     GI bleed    Penicillins Hives     Has tolerated cefepime, ceftriaxone, cefazolin, cefdinir, cefuroxime, cephalexin       Objective     Physical Exam:   Vital Signs:   Vitals:    12/05/24 0906   BP: 92/60   Pulse: 67   Weight: 96.6 kg (213 lb)   Height: 190.5 cm (75\")     Body mass index is 26.62 kg/m².     Physical Exam  Vitals and nursing note reviewed.   Constitutional:       Appearance: Normal appearance.   HENT:      Head: Normocephalic and atraumatic.      Nose: Nose normal.      Mouth/Throat:      Mouth: Mucous membranes are moist.   Eyes:      Pupils: Pupils are equal, round, and reactive to light.   Pulmonary:      Effort: Pulmonary effort is normal.   Abdominal:      General: Abdomen is flat.      Palpations: Abdomen is soft.   Genitourinary:     Comments: Uncircumcised penis. Mild meatal stenosis.   Musculoskeletal:      Cervical back: Normal range of motion.      Comments: Wheelchair to ambulate    Skin:     General: Skin is warm and dry.      Capillary Refill: Capillary refill takes less than 2 seconds.   Neurological:      General: No focal deficit present.      Mental Status: He is alert.   Psychiatric:         Mood and Affect: Mood normal.       Labs:   Brief Urine Lab Results  (Last result in the past 365 days)        Color   Clarity   Blood   Leuk Est   Nitrite   Protein   CREAT   Urine HCG        09/04/24 2148 Yellow   Clear   Negative   Small (1+)   Negative   Trace                   Urine Culture          2/1/2024    17:14 5/7/2024    15:07 9/4/2024    21:48   Urine Culture   Urine Culture No growth  50,000 CFU/mL Mixed [place] Isolated  >100,000 CFU/mL Enterobacter cloacae complex         Lab Results   Component Value Date    GLUCOSE 61 (L) 11/18/2024    CALCIUM 9.2 " 11/18/2024     11/18/2024    K 4.2 11/18/2024    CO2 32.0 (H) 11/18/2024     11/18/2024    BUN 19 11/18/2024    CREATININE 1.14 11/18/2024    EGFRIFNONA 79 12/16/2021    BCR 16.7 11/18/2024    ANIONGAP 10.0 11/18/2024       Lab Results   Component Value Date    WBC 8.02 11/18/2024    HGB 11.4 (L) 11/18/2024    HCT 34.7 (L) 11/18/2024    MCV 99.7 (H) 11/18/2024     11/18/2024       Images:   XR Chest 1 View    Result Date: 11/6/2024  Impression: Cardiomegaly. Left basilar atelectasis. Small bilateral pleural effusions. Electronically Signed: Timothy Willoughby MD  11/6/2024 4:07 AM EST  Workstation ID: [id number]    FL ERCP pancreatic and biliary ducts    Result Date: 11/5/2024  Impression: Fluoroscopy provided during ERCP. Electronically Signed: Oliverio Younger MD  11/5/2024 9:07 AM EST  Workstation ID: [id number]    FL ERCP pancreatic and biliary ducts    Result Date: 9/5/2024  Impression: Fluoroscopy provided during ERCP and stent placement. Electronically Signed: Oliverio Younger MD  9/5/2024 4:25 PM EDT  Workstation ID: [id number]    MRI abdomen wo contrast mrcp    Result Date: 9/5/2024  Impression: Choledocholithiasis with several small stones in the distal common bile duct. The largest stone is about 5 mm. The common bile duct is not dilated. Electronically Signed: Timothy Willoughby MD  9/5/2024 5:19 AM EDT  Workstation ID: [id number]    CT Abdomen Pelvis With Contrast    Result Date: 9/4/2024  1. There appears to be debris or noncalcified calculi within the distal common bile duct. No evidence of cholecystitis at this time. 2. No pulmonary embolus. 3. Small right and trace left pleural effusions. 4. 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 Cardiology consult recommended. Electronically Signed: Yoni Rios MD  9/4/2024 8:06 PM EDT  Workstation ID: [id number]    CT Angiogram Chest Pulmonary Embolism    Result Date: 9/4/2024  1. There appears to be debris or noncalcified calculi within the distal common bile  duct. No evidence of cholecystitis at this time. 2. No pulmonary embolus. 3. Small right and trace left pleural effusions. 4. 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 Cardiology consult recommended. Electronically Signed: Yoni Rios MD  9/4/2024 8:06 PM EDT  Workstation ID: [id number]    CT Head Without Contrast    Result Date: 9/4/2024  Impression: 1. No acute intracranial findings by CT. Chronic findings detailed above similar to previous MRI brain comparison. 2. Possible acute on chronic sinusitis in the right clinical setting, with layering fluid noted in the right maxillary sinus. Electronically Signed: Compa Dominguez MD  9/4/2024 7:56 PM EDT  Workstation ID: [id number]    XR Chest 1 View    Result Date: 9/4/2024  Impression: No acute radiographic findings. Stable appearance of the chest since 6/3/2024 comparison. Electronically Signed: Compa Dominguez MD  9/4/2024 6:13 PM EDT  Workstation ID: [id number]      Measures:   Tobacco:   Darien Camarena  reports that he quit smoking about 64 years ago. His smoking use included cigarettes. He started smoking about 77 years ago. He has a 15 pack-year smoking history. He has been exposed to tobacco smoke. He quit smokeless tobacco use about 13 years ago.  His smokeless tobacco use included chew.     Urine Incontinence: Patient reports that he is not currently experiencing any symptoms of urinary incontinence.      Assessment / Plan      Assessment/Plan:   89 y.o. male who presented today for follow up of urinary retention, recurrent UTI, and trouble shooting catheter.     At the bedside, using sterile technique I was able to pass a 16 fr straight catheter with minimal resistance met. There was immediate return of clear/yellow urine output. He tolerated well. Bladder was drained and rahman catheter was removed. I discussed minimal difficulty passing catheter, I recommend he use additional lubricant at home for CIC. I provided him with samples of 14 straight  catheters to use if he develops additional resistance passing 16 fr catheter. He is agreeable. Urine sample obtained, will send for culture if urinalysis appears infected.     I had a detailed discussion with patient and patients daughter regarding patients dizziness and hypotension.They have elected to present to [facility] ED for further workup. Report called to ED. Patient was escorted to ED by practice manager and MA.     Patient will follow up in 3 months     Diagnoses and all orders for this visit:    1. Urinary retention (Primary)    2. Recurrent UTI    3. Dizziness           Follow Up:   Return in about 3 months (around 3/5/2025).    I spent approximately 30 minutes providing clinical care for this patient; including review of patient's chart and provider documentation, face to face time spent with patient in examination room (obtaining history, performing physical exam, discussing diagnosis and management options), placing orders, and completing patient documentation.     BERYL Nino  [facility]

## 2024-12-05 NOTE — ED NOTES
/ Darien Camarena    Nursing Report ED to Floor:  Mental status: alert and oriented x 4  Ambulatory status: up with assist  Oxygen Therapy:  ra  Cardiac Rhythm: a-fib  Admitted from: home  Safety Concerns:  fall risk  Social Issues: pt lives alone  ED Room #:  30    ED Nurse Phone Extension - [phone] or may call [phone].      HPI:   Chief Complaint   Patient presents with    Weakness - Generalized    Dizziness       Past Medical History:  Past Medical History:   Diagnosis Date    Arrhythmia     Atrial fibrillation     Chronic kidney disease     COPD (chronic obstructive pulmonary disease)     Coronary artery disease     Diabetes mellitus     doesnt check sugar    E. coli sepsis 06/23/2022    Enlarged prostate without lower urinary tract symptoms (luts) 06/20/2016    Full dentures     GERD (gastroesophageal reflux disease)     Gout     Hearing aid worn     bilat prn    History of colonoscopy 09/12/2012    History of radiation therapy 02/24/2023    SBRT LLL lung    [place] (hard of hearing)     hearing aids prn    Hyperlipidemia     Hypertension     Kidney stone     surgery x1    Lung cancer     STEVEN on CPAP     compliant with machine    PAF (paroxysmal atrial fibrillation)     Prostatism     Urinary incontinence     Urinary tract infection     Wears glasses     readers        Past Surgical History:  Past Surgical History:   Procedure Laterality Date    ATRIAL APPENDAGE EXCLUSION LEFT WITH TRANSESOPHAGEAL ECHOCARDIOGRAM N/A 11/28/2023    Procedure: Atrial Appendage Occlusion;  Surgeon: Anthony Mcdaniel MD;  Location:  [facility];  Service: Cardiovascular;  Laterality: N/A;    CARDIAC CATHETERIZATION Left 09/29/2022    Procedure: Left Heart Cath;  Surgeon: Titus Oliveros IV, MD;  Location:  [facility];  Service: Cardiovascular;  Laterality: Left;    CARDIOVERSION      CATARACT EXTRACTION Bilateral     COLONOSCOPY      CYSTOSCOPY      ENDOSCOPY      possible    ENDOSCOPY N/A 9/5/2024     Procedure: ESOPHAGOGASTRODUODENOSCOPY;  Surgeon: Anthony Arambula MD;  Location:  [facility];  Service: Gastroenterology;  Laterality: N/A;  Esophagus dilated to 18mm with 12mm-mm to 15mm, then 15mm to 18mm balloon.    ENDOSCOPY N/A 10/31/2024    Procedure: ESOPHAGOGASTRODUODENOSCOPY WITH BIOPSY;  Surgeon: Carlos Dior MD;  Location:  [facility];  Service: Gastroenterology;  Laterality: N/A;    ERCP N/A 9/5/2024    Procedure: ENDOSCOPIC RETROGRADE CHOLANGIOPANCREATOGRAPHY;  Surgeon: Anthony Arambula MD;  Location:  [facility];  Service: Gastroenterology;  Laterality: N/A;  Sphincterotomy made to ampula of common bile duct (CBD), CBD swept with 9mm-12mm balloon - sludge, stones and purulent appearing drainage extracted. 10x7 Arabic biliary stent depolyed into CBD. ERCP scope removed with balloon intact.    ERCP N/A 10/31/2024    Procedure: ENDOSCOPIC RETROGRADE CHOLANGIOPANCREATOGRAPHY;  Surgeon: Carlos Dior MD;  Location:  [facility];  Service: Gastroenterology;  Laterality: N/A;    KIDNEY STONE SURGERY      x1        Admitting Doctor:   Junaid Acosta MD    Consulting Provider(s):  Consults       No orders found from 11/6/2024 to 12/6/2024.             Admitting Diagnosis:   The primary encounter diagnosis was Orthostasis. Diagnoses of Light headedness, Chronic atrial fibrillation with rapid ventricular response, and Acute on chronic urinary retention were also pertinent to this visit.    Most Recent Vitals:   Vitals:    12/05/24 1500 12/05/24 1515 12/05/24 1530 12/05/24 1545   BP: 114/84 99/73 120/87 110/87   BP Location:       Patient Position:       Pulse: 112 106 105 108   Resp:       Temp:       TempSrc:       SpO2: 96% 93% 96% 92%   Weight:       Height:           Active LDAs/IV Access:   Lines, Drains & Airways       Active LDAs       Name Placement date Placement time Site Days    Peripheral IV 12/05/24 1040 Left Antecubital 12/05/24  1040  Antecubital   less than 1                    Labs (abnormal labs have a star):   Labs Reviewed   COMPREHENSIVE METABOLIC PANEL - Abnormal; Notable for the following components:       Result Value    Glucose 106 (*)     BUN 40 (*)     AST (SGOT) 41 (*)     Alkaline Phosphatase 221 (*)     BUN/Creatinine Ratio 33.6 (*)     eGFR 58.8 (*)     All other components within normal limits    Narrative:     GFR Normal >60  Chronic Kidney Disease <60  Kidney Failure <15    The GFR formula is only valid for adults with stable renal function between ages 18 and 70.   SINGLE HS TROPONIN T - Abnormal; Notable for the following components:    HS Troponin T 36 (*)     All other components within normal limits    Narrative:     High Sensitive Troponin T Reference Range:  <14.0 ng/L- Negative Female for AMI  <22.0 ng/L- Negative Male for AMI  >=14 - Abnormal Female indicating possible myocardial injury.  >=22 - Abnormal Male indicating possible myocardial injury.   Clinicians would have to utilize clinical acumen, EKG, Troponin, and serial changes to determine if it is an Acute Myocardial Infarction or myocardial injury due to an underlying chronic condition.        CBC WITH AUTO DIFFERENTIAL - Abnormal; Notable for the following components:    .0 (*)     MCHC 31.1 (*)     RDW-SD 55.0 (*)     Neutrophil % 76.2 (*)     Lymphocyte % 15.2 (*)     Neutrophils, Absolute 7.39 (*)     All other components within normal limits   LACTIC ACID, PLASMA - Abnormal; Notable for the following components:    Lactate 3.0 (*)     All other components within normal limits   LACTIC ACID, REFLEX - Abnormal; Notable for the following components:    Lactate 2.9 (*)     All other components within normal limits   URINALYSIS W/ CULTURE IF INDICATED - Abnormal; Notable for the following components:    Leuk Esterase, UA Moderate (2+) (*)     Nitrite, UA Positive (*)     All other components within normal limits    Narrative:     In absence of clinical symptoms, the  "presence of pyuria, bacteria, and/or nitrites on the urinalysis result does not correlate with infection.   URINALYSIS, MICROSCOPIC ONLY - Abnormal; Notable for the following components:    WBC, UA 21-50 (*)     Bacteria, UA 4+ (*)     All other components within normal limits   MAGNESIUM - Normal   PROCALCITONIN - Normal    Narrative:     As a Marker for Sepsis (Non-Neonates):    1. <0.5 ng/mL represents a low risk of severe sepsis and/or septic shock.  2. >2 ng/mL represents a high risk of severe sepsis and/or septic shock.    As a Marker for Lower Respiratory Tract Infections that require antibiotic therapy:    PCT on Admission    Antibiotic Therapy       6-12 Hrs later    >0.5                Strongly Recommended  >0.25 - <0.5        Recommended   0.1 - 0.25          Discouraged              Remeasure/reassess PCT  <0.1                Strongly Discouraged     Remeasure/reassess PCT    As 28 day mortality risk marker: \"Change in Procalcitonin Result\" (>80% or <=80%) if Day 0 (or Day 1) and Day 4 values are available. Refer to http://www.[facility] [place]pct-calculator[place]    Change in PCT <=80%  A decrease of PCT levels below or equal to 80% defines a positive change in PCT test result representing a higher risk for 28-day all-cause mortality of patients diagnosed with severe sepsis for septic shock.    Change in PCT >80%  A decrease of PCT levels of more than 80% defines a negative change in PCT result representing a lower risk for 28-day all-cause mortality of patients diagnosed with severe sepsis or septic shock.      BLOOD CULTURE   BLOOD CULTURE   URINE CULTURE   RAINBOW DRAW    Narrative:     The following orders were created for panel order Bent Draw.  Procedure                               Abnormality         Status                     ---------                               -----------         ------                     Green Top (Gel)[[id number]]                                  Final result             "   Lavender Top[[id number]]                                     Final result               Gold Top - SST[[id number]]                                   Final result               Gray Top[[id number]]                                         Final result               Light Blue Top[[id number]]                                   Final result                 Please view results for these tests on the individual orders.   CBC AND DIFFERENTIAL    Narrative:     The following orders were created for panel order CBC & Differential.  Procedure                               Abnormality         Status                     ---------                               -----------         ------                     CBC Auto Differential[[id number]]        Abnormal            Final result                 Please view results for these tests on the individual orders.   GREEN TOP   LAVENDER TOP   GOLD TOP - SST   GRAY TOP   LIGHT BLUE TOP       Meds Given in ED:   Medications   sodium chloride 0.9 % flush 10 mL (has no administration in time range)   vancomycin 2250 mg/500 mL 0.9% NS IVPB ([facility]) (2,250 mg Intravenous New Bag 12/5/24 1544)   cefepime 1000 mg IVPB in 100 mL NS (MBP) (has no administration in time range)   sodium chloride 0.9 % bolus 1,000 mL (0 mL Intravenous Stopped 12/5/24 1203)   dilTIAZem (CARDIZEM) injection 10 mg (10 mg Intravenous Given 12/5/24 1259)           Last NIH score:                                                          Dysphagia screening results:  Patient Factors Component (Dysphagia:Stroke or Rule-out)  Best Eye Response: 4-->(E4) spontaneous (12/05/24 1049)  Best Motor Response: 6-->(M6) obeys commands (12/05/24 1049)  Best Verbal Response: 5-->(V5) oriented (12/05/24 1049)  [place] Coma Scale Score: 15 (12/05/24 1049)     [place] Coma Scale:  No data recorded     CIWA:        Restraint Type:            Isolation Status:  Contact

## 2024-12-05 NOTE — H&P
[facility]  HISTORY AND PHYSICAL    Patient Name: Darien Camarena  : 1935  MRN: [id number]  Primary Care Physician: Pito Way MD  Date of admission: 2024      Subjective   Subjective     Chief Complaint: Dizziness, urinary retention    HPI:  Darien Camarena is a 89 y.o. male with history of chronic atrial fibrillation s/p watchman, CKD stage III, chronic urinary retention with self caths, HFmr EF, type 2 diabetes, hypertension hyperlipidemia.  Patient presents from urology clinic with complaints of dizziness, trouble self cathing for the last couple of days.  While in the office, patient was found to be hypotensive with SBP in the 90s and symptomatic and as such was sent to the ED.  On arrival here his BP was 67/44, he was tachycardic with heart rate of 121.  Initial lab work revealed lactate 3.0, otherwise f/u unremarkable.  UA was obtained, blood cultures sent, he was started on broad-spectrum antibiotics, received a dose of diltiazem and hospital medicine asked to admit.  At the time of my evaluation, patient was able to self cath, he continued to endorse dizziness mostly upon standing, denies any falls he denies any fevers or chills, no nausea no vomiting, no SOA      Personal History     Past Medical History:   Diagnosis Date    Arrhythmia     Atrial fibrillation     Chronic kidney disease     COPD (chronic obstructive pulmonary disease)     Coronary artery disease     Diabetes mellitus     doesnt check sugar    E. coli sepsis 2022    Enlarged prostate without lower urinary tract symptoms (luts) 2016    Full dentures     GERD (gastroesophageal reflux disease)     Gout     Hearing aid worn     bilat prn    History of colonoscopy 2012    History of radiation therapy 2023    SBRT LLL lung    [place] (hard of hearing)     hearing aids prn    Hyperlipidemia     Hypertension     Kidney stone     surgery x1    Lung cancer      STEVEN on CPAP     compliant with machine    PAF (paroxysmal atrial fibrillation)     Prostatism     Urinary incontinence     Urinary tract infection     Wears glasses     readers         Oncology Problem List:  Malignant neoplasm of lower lobe of left lung (01/30/2023; Status:   Active)    Oncology/Hematology History   Malignant neoplasm of lower lobe of left lung   1/30/2023 Initial Diagnosis    Malignant neoplasm of lower lobe of left lung (HCC)     2/14/2023 - 2/24/2023 Radiation    Radiation OncologyTreatment Course:  Darien Camarena received 5000 cGy in 5 fractions to left lung via External Beam Radiation - EBRT.       Past Surgical History:   Procedure Laterality Date    ATRIAL APPENDAGE EXCLUSION LEFT WITH TRANSESOPHAGEAL ECHOCARDIOGRAM N/A 11/28/2023    Procedure: Atrial Appendage Occlusion;  Surgeon: Anthony Mcdaniel MD;  Location:  [facility];  Service: Cardiovascular;  Laterality: N/A;    CARDIAC CATHETERIZATION Left 09/29/2022    Procedure: Left Heart Cath;  Surgeon: Titus Oliveros IV, MD;  Location:  [facility];  Service: Cardiovascular;  Laterality: Left;    CARDIOVERSION      CATARACT EXTRACTION Bilateral     COLONOSCOPY      CYSTOSCOPY      ENDOSCOPY      possible    ENDOSCOPY N/A 9/5/2024    Procedure: ESOPHAGOGASTRODUODENOSCOPY;  Surgeon: Anthony Arambula MD;  Location:  [facility];  Service: Gastroenterology;  Laterality: N/A;  Esophagus dilated to 18mm with 12mm-mm to 15mm, then 15mm to 18mm balloon.    ENDOSCOPY N/A 10/31/2024    Procedure: ESOPHAGOGASTRODUODENOSCOPY WITH BIOPSY;  Surgeon: Carlos Dior MD;  Location:  [facility];  Service: Gastroenterology;  Laterality: N/A;    ERCP N/A 9/5/2024    Procedure: ENDOSCOPIC RETROGRADE CHOLANGIOPANCREATOGRAPHY;  Surgeon: Anthony Arambula MD;  Location:  [facility];  Service: Gastroenterology;  Laterality: N/A;  Sphincterotomy made to ampula of common bile duct (CBD), CBD swept  with 9mm-12mm balloon - sludge, stones and purulent appearing drainage extracted. 10x7 Burmese biliary stent depolyed into CBD. ERCP scope removed with balloon intact.    ERCP N/A 10/31/2024    Procedure: ENDOSCOPIC RETROGRADE CHOLANGIOPANCREATOGRAPHY;  Surgeon: Carlos Dior MD;  Location: [facility];  Service: Gastroenterology;  Laterality: N/A;    KIDNEY STONE SURGERY      x1       Family History: family history includes Alzheimer's disease in his mother; COPD in his father; Cancer in his father; Kidney disease in his father; Lung cancer in his father; No Known Problems in his daughter, daughter, and daughter.     Social History:  reports that he quit smoking about 64 years ago. His smoking use included cigarettes. He started smoking about 77 years ago. He has a 15 pack-year smoking history. He has been exposed to tobacco smoke. He quit smokeless tobacco use about 13 years ago.  His smokeless tobacco use included chew. He reports that he does not drink alcohol and does not use drugs.  Social History     Social History Narrative    Caffeine: 1 cup of decaff coffee daily        Medications:  Available home medication information reviewed.  Coenzyme Q10, Iron, Probiotic Product, albuterol sulfate HFA, allopurinol, ascorbic acid, docusate sodium, donepezil, finasteride, furosemide, memantine, metFORMIN, methenamine, metoprolol tartrate, multivitamin with minerals, omeprazole, potassium chloride, pravastatin, sertraline, and tiotropium bromide-olodaterol    Allergies   Allergen Reactions    Sglt2 Inhibitors Other (See Comments)     Recurrent UTI    Xarelto [Rivaroxaban] GI Bleeding     GI bleed    Penicillins Hives     Has tolerated cefepime, ceftriaxone, cefazolin, cefdinir, cefuroxime, cephalexin       Objective   Objective     Vital Signs:   Temp:  [97.7 °F (36.5 °C)] 97.7 °F (36.5 °C)  Heart Rate:  [] 112  Resp:  [16] 16  BP: ()/(44-95) 117/87       Physical Exam   Constitutional:  Elderly male, in no awake, alert  Eyes: PERRLA, sclerae anicteric, no conjunctival injection  HENT: NCAT, mucous membranes moist  Neck: Supple, no thyromegaly, no lymphadenopathy, trachea midline  Respiratory: Clear to auscultation bilaterally, nonlabored respirations   Cardiovascular: Irregular irregular no murmurs, rubs, or gallops, palpable pedal pulses bilaterally  Gastrointestinal: Positive bowel sounds, soft, nontender, nondistended  Musculoskeletal: No bilateral ankle edema, no clubbing or cyanosis to extremities  Psychiatric: Appropriate affect, cooperative  Neurologic: Oriented x 3, strength symmetric in all extremities, Cranial Nerves grossly intact to confrontation, speech clear  Skin: No rashes     Result Review:  I have personally reviewed the results from the time of this admission to 12/5/2024 15:04 EST and agree with these findings:  [x]  Laboratory list / accordion  []  Microbiology  []  Radiology  []  EKG/Telemetry   []  Cardiology/Vascular   []  Pathology  []  Old records  []  Other:  Most notable findings include:     LAB RESULTS:      Lab 12/05/24  1350 12/05/24  1034   WBC  --  9.69   HEMOGLOBIN  --  13.6   HEMATOCRIT  --  43.7   PLATELETS  --  252   NEUTROS ABS  --  7.39*   IMMATURE GRANS (ABS)  --  0.03   LYMPHS ABS  --  1.47   MONOS ABS  --  0.48   EOS ABS  --  0.25   MCV  --  100.0*   PROCALCITONIN  --  0.05   LACTATE 2.9* 3.0*         Lab 12/05/24  1034   SODIUM 142   POTASSIUM 5.0   CHLORIDE 100   CO2 29.0   ANION GAP 13.0   BUN 40*   CREATININE 1.19   EGFR 58.8*   GLUCOSE 106*   CALCIUM 9.7   MAGNESIUM 1.7         Lab 12/05/24  1034   TOTAL PROTEIN 7.7   ALBUMIN 3.5   GLOBULIN 4.2   ALT (SGPT) 18   AST (SGOT) 41*   BILIRUBIN 0.9   ALK PHOS 221*         Lab 12/05/24  1034   HSTROP T 36*                 UA          5/7/2024    15:07 5/7/2024    15:16 9/4/2024    21:48 12/5/2024    10:52 12/5/2024    14:32   Urinalysis   Squamous Epithelial Cells, UA 3-6   3-6   None Seen    Specific  Gravity, UA   1.015   1.010    Ketones, UA  Negative  Negative  Negative  Negative    Blood, UA   Negative   Negative    Leukocytes, UA  500 Germaine/ul  Small (1+)  Large (3+)  Moderate (2+)    Nitrite, UA   Negative   Positive    RBC, UA 0-2   0-2   0-2    WBC, UA 6-10   11-20   21-50    Bacteria, UA Trace   3+   4+        Microbiology Results (last 10 days)       ** No results found for the last 240 hours. **            XR Chest 1 View    Result Date: 12/5/2024  XR CHEST 1 VW Date of Exam: 12/5/2024 11:07 AM EST Indication: Weak/Dizzy/AMS triage protocol Comparison: 11/6/2024 Findings: Left lower lobe infiltrate has improved with minimal infiltrate remaining. Heart size is normal. Right lung is clear. There are no effusions.     Impression: Impression: Improved left lower lobe infiltrate. No new abnormality. Electronically Signed: Aster Mathur MD  12/5/2024 11:36 AM EST  Workstation ID: [id number]     Results for orders placed during the hospital encounter of 11/21/24    Adult Transesophageal Echo 3D (FARHAD) W/ Cont If Necessary Per Protocol    Interpretation Summary    There is a 27mm Watchman FLX device in place. It appears well seated and well compressed with no device related thrombus seen. There is a small jet of color flow, 2.5 mm, at the superior aspect adjacent to the left upper pulmonary vein. This was not seen previous examination however image quality is improved compared to prior.    Left ventricular systolic function is moderately decreased. Left ventricular ejection fraction appears to be 36 - 40%. Normal left ventricular wall thickness noted. The left ventricular cavity is dilated. There is left ventricular global hypokinesis noted.    : The right ventricular cavity is mildly dilated. Mildly reduced right ventricular systolic function noted.    : The left atrial cavity is severely dilated. No evidence of a left atrial thrombus present. There is light spontaneous echo contrast present in the atrial  body.    The right atrial cavity is severely dilated.    There is mild, bileaflet mitral valve thickening present. Mild mitral valve regurgitation is present. No significant mitral valve stenosis is present.    Mild tricuspid valve regurgitation is present. Estimated right ventricular systolic pressure from tricuspid regurgitation is mildly elevated (35-45 mmHg).    There is moderate pulmonic valve regurgitation present.      Assessment & Plan   Assessment & Plan       Acute on chronic urinary retention    Essential hypertension    Stage 3 chronic kidney disease    Orthostatic hypotension    Heart failure with mildly reduced ejection fraction (HFmrEF)    RU-analy    Darien Camarena is a 89 y.o. male with history of chronic atrial fibrillation s/p watchman, CKD stage III, chronic urinary retention with self caths, HFmr EF, type 2 diabetes, hypertension hyperlipidemia.  Patient sent from urology clinic with complaints of dizziness and hypotension    Orthostatic hypotension  Hypotension  -Patient complaining of dizziness while standing up  -BP is low on arrival here  -Hold antihypertensives and diuretics  -Resolved start IV fluids and repeat orthostatic vital signs  -May need med adjustments at discharge     Chronic atrial fibrillation  -S/p watchman, heart rate elevated in the ED  -He s/p Cardizem 10 mg x 1, he is on metoprolol 100 mg twice daily, will continue with holding parameters  -Cardiology consulted, sees Dr. Oliveros    CKD stage III  -Renal function seems to be stable and within his baseline    HFmrEF  -Patient seems to be currently compensated, last echo on file 11/21 with EF 36-to 40%  -Recently had a recent increase in Lasix due to lower extremity edema  -Hold diuretics at this time due to hypotension    Chronic urinary retention  Suspected UTI  -Patient self caths 4 times daily, has been having difficulty recently though he was able to self cath in the ED today  -Seen in urology clinic this morning, they  are consulted  -Follow-up UA, positive leuks follow-up blood and urine cultures, did receive a dose of vancomycin and cefepime in the ED, will hold off on further antibiotics at this time  -Continue Proscar    Well-controlled type 2 diabetes with A1c 5.9%  -Continue SSI for now, hold metformin    Dementia and depression  -Daughter stated that this has been progressive  -Continue Aricept and Namenda  -Continue Zoloft    VTE Prophylaxis:  Mechanical VTE prophylaxis orders are signed & held.      CODE STATUS:    There are no questions and answers to display.     Expected Discharge   Expected Discharge Date: 12/7/2024; Expected Discharge Time:      Junaid Acosta MD  12/05/24

## 2024-12-05 NOTE — CASE MANAGEMENT/SOCIAL WORK
Discharge Planning Assessment  [facility]     Patient Name: Darien Camarena  MRN: [id number]  Today's Date: 12/5/2024    Admit Date: 12/5/2024    Plan: IDP   Discharge Needs Assessment       Row Name 12/05/24 1352       Living Environment    People in Home alone    Current Living Arrangements home    Primary Care Provided by self;child(ann)    Provides Primary Care For no one, unable/limited ability to care for self    Family Caregiver if Needed child(ann), adult    Quality of Family Relationships helpful;involved       Transition Planning    Patient/Family Anticipated Services at Transition     Transportation Anticipated family or friend will provide       Discharge Needs Assessment    Readmission Within the Last 30 Days no previous admission in last 30 days    Equipment Currently Used at Home wheelchair;cane, straight;rollator;cpap    Concerns to be Addressed discharge planning                   Discharge Plan       Row Name 12/05/24 5395       Plan    Plan IDP    Plan Comments MSW spoke to Pt’s daughter via phone to complete IDP. Pt lives alone in [facility]. Pt’s daughter confirmed demographics and reports PCP is Dr. Way. Pt has [employer] Medicare insurance coverage. Pt moderate with ADLs; Pt has cane, rollator, WC and CPAP. Pt has no HH services. Pt has no home O2. Pt’s preferred pharmacy is "[facility] Pt’s daughter able to transport when medically ready. CM will continue to follow.                  Continued Care and Services - Admitted Since 12/5/2024    No active coordination exists for this encounter.          Demographic Summary       Row Name 12/05/24 1356       General Information    Arrived From home    Referral Source admission list;emergency department    Reason for Consult discharge planning    Preferred Language English                   Functional Status       Row Name 12/05/24 1351       Functional Status    Usual Activity Tolerance good    Current Activity Tolerance moderate        Functional Status, IADL    Medications assistive equipment    Meal Preparation assistive equipment    Housekeeping assistive equipment    Laundry assistive equipment    Shopping assistive equipment                               VERNON Dalal

## 2024-12-06 ENCOUNTER — APPOINTMENT (OUTPATIENT)
Dept: CT IMAGING | Facility: HOSPITAL | Age: 89
End: 2024-12-06
Payer: MEDICARE

## 2024-12-06 LAB
ANION GAP SERPL CALCULATED.3IONS-SCNC: 6 MMOL/L (ref 5–15)
BUN SERPL-MCNC: 31 MG/DL (ref 8–23)
BUN/CREAT SERPL: 33 (ref 7–25)
CALCIUM SPEC-SCNC: 8.6 MG/DL (ref 8.6–10.5)
CHLORIDE SERPL-SCNC: 105 MMOL/L (ref 98–107)
CO2 SERPL-SCNC: 27 MMOL/L (ref 22–29)
CREAT SERPL-MCNC: 0.94 MG/DL (ref 0.76–1.27)
EGFRCR SERPLBLD CKD-EPI 2021: 78 ML/MIN/1.73
GLUCOSE BLDC GLUCOMTR-MCNC: 113 MG/DL (ref 70–130)
GLUCOSE BLDC GLUCOMTR-MCNC: 135 MG/DL (ref 70–130)
GLUCOSE BLDC GLUCOMTR-MCNC: 140 MG/DL (ref 70–130)
GLUCOSE BLDC GLUCOMTR-MCNC: 173 MG/DL (ref 70–130)
GLUCOSE SERPL-MCNC: 119 MG/DL (ref 65–99)
POTASSIUM SERPL-SCNC: 4.4 MMOL/L (ref 3.5–5.2)
SODIUM SERPL-SCNC: 138 MMOL/L (ref 136–145)

## 2024-12-06 PROCEDURE — 82948 REAGENT STRIP/BLOOD GLUCOSE: CPT

## 2024-12-06 PROCEDURE — 80048 BASIC METABOLIC PNL TOTAL CA: CPT | Performed by: INTERNAL MEDICINE

## 2024-12-06 PROCEDURE — 99232 SBSQ HOSP IP/OBS MODERATE 35: CPT | Performed by: STUDENT IN AN ORGANIZED HEALTH CARE EDUCATION/TRAINING PROGRAM

## 2024-12-06 PROCEDURE — 74176 CT ABD & PELVIS W/O CONTRAST: CPT

## 2024-12-06 PROCEDURE — 97161 PT EVAL LOW COMPLEX 20 MIN: CPT

## 2024-12-06 PROCEDURE — 25810000003 SODIUM CHLORIDE 0.9 % SOLUTION: Performed by: INTERNAL MEDICINE

## 2024-12-06 PROCEDURE — 97165 OT EVAL LOW COMPLEX 30 MIN: CPT

## 2024-12-06 PROCEDURE — 99222 1ST HOSP IP/OBS MODERATE 55: CPT | Performed by: INTERNAL MEDICINE

## 2024-12-06 PROCEDURE — 25010000002 ERTAPENEM PER 500 MG: Performed by: INTERNAL MEDICINE

## 2024-12-06 PROCEDURE — 99232 SBSQ HOSP IP/OBS MODERATE 35: CPT | Performed by: INTERNAL MEDICINE

## 2024-12-06 PROCEDURE — 63710000001 INSULIN LISPRO (HUMAN) PER 5 UNITS: Performed by: INTERNAL MEDICINE

## 2024-12-06 RX ORDER — FLUTICASONE PROPIONATE 50 MCG
2 SPRAY, SUSPENSION (ML) NASAL 2 TIMES DAILY
Status: DISCONTINUED | OUTPATIENT
Start: 2024-12-06 | End: 2024-12-18 | Stop reason: HOSPADM

## 2024-12-06 RX ORDER — AMIODARONE HYDROCHLORIDE 200 MG/1
200 TABLET ORAL EVERY 12 HOURS SCHEDULED
Status: DISCONTINUED | OUTPATIENT
Start: 2024-12-06 | End: 2024-12-08

## 2024-12-06 RX ADMIN — SODIUM CHLORIDE 75 ML/HR: 9 INJECTION, SOLUTION INTRAVENOUS at 09:56

## 2024-12-06 RX ADMIN — Medication 10 ML: at 09:49

## 2024-12-06 RX ADMIN — FINASTERIDE 5 MG: 5 TABLET, FILM COATED ORAL at 09:46

## 2024-12-06 RX ADMIN — Medication 10 ML: at 20:50

## 2024-12-06 RX ADMIN — SERTRALINE HYDROCHLORIDE 50 MG: 50 TABLET ORAL at 09:46

## 2024-12-06 RX ADMIN — DONEPEZIL HYDROCHLORIDE 10 MG: 10 TABLET, FILM COATED ORAL at 20:49

## 2024-12-06 RX ADMIN — INSULIN LISPRO 2 UNITS: 100 INJECTION, SOLUTION INTRAVENOUS; SUBCUTANEOUS at 20:49

## 2024-12-06 RX ADMIN — MEMANTINE 10 MG: 10 TABLET ORAL at 09:51

## 2024-12-06 RX ADMIN — MEMANTINE 10 MG: 10 TABLET ORAL at 20:49

## 2024-12-06 RX ADMIN — FLUTICASONE PROPIONATE 2 SPRAY: 50 SPRAY, METERED NASAL at 11:20

## 2024-12-06 RX ADMIN — PANTOPRAZOLE SODIUM 40 MG: 40 TABLET, DELAYED RELEASE ORAL at 04:47

## 2024-12-06 RX ADMIN — AMIODARONE HYDROCHLORIDE 200 MG: 200 TABLET ORAL at 20:49

## 2024-12-06 RX ADMIN — PRAVASTATIN SODIUM 40 MG: 40 TABLET ORAL at 09:46

## 2024-12-06 RX ADMIN — ERTAPENEM 1000 MG: 1 INJECTION INTRAMUSCULAR; INTRAVENOUS at 09:45

## 2024-12-06 RX ADMIN — AMIODARONE HYDROCHLORIDE 200 MG: 200 TABLET ORAL at 09:46

## 2024-12-06 RX ADMIN — FLUTICASONE PROPIONATE 2 SPRAY: 50 SPRAY, METERED NASAL at 20:49

## 2024-12-06 NOTE — THERAPY EVALUATION
Patient Name: Darien Camarena  : 1935    MRN: [id number]                              Today's Date: 2024       Admit Date: 2024    Visit Dx:     ICD-10-CM ICD-9-CM   1. Orthostasis  I95.1 458.0   2. Light headedness  R42 780.4   3. Chronic atrial fibrillation with rapid ventricular response  I48.20 427.31   4. Acute on chronic urinary retention  R33.9 788.29     Patient Active Problem List   Diagnosis    Type 2 diabetes mellitus with stage 3 chronic kidney disease, without long-term current use of insulin    Gastroesophageal reflux disease with esophagitis    Hyperlipidemia LDL goal <100    Essential hypertension    Nephrolithiasis    Obstructive sleep apnea syndrome    Permanent atrial fibrillation    Stage 3 chronic kidney disease    Coronary artery disease involving native coronary artery of native heart without angina pectoris    Malignant neoplasm of lower lobe of left lung    H/O: GI bleed    Presence of Watchman left atrial appendage closure device    Orthostatic hypotension    Heart failure with mildly reduced ejection fraction (HFmrEF)    Obesity    Choledocholithiasis    Severe malnutrition    Esophagitis    Encounter for removal of biliary stent    A-fib    Acute on chronic urinary retention     Past Medical History:   Diagnosis Date    Arrhythmia     Atrial fibrillation     Chronic kidney disease     COPD (chronic obstructive pulmonary disease)     Coronary artery disease     Dementia     Diabetes mellitus     doesnt check sugar    E. coli sepsis 2022    Enlarged prostate without lower urinary tract symptoms (luts) 2016    Full dentures     GERD (gastroesophageal reflux disease)     Gout     Hearing aid worn     bilat prn    History of colonoscopy 2012    History of radiation therapy 2023    SBRT LLL lung    [place] (hard of hearing)     hearing aids prn    Hyperlipidemia     Hypertension     Kidney stone     surgery x1    Lung cancer     STEVEN on CPAP      compliant with machine    PAF (paroxysmal atrial fibrillation)     Prostatism     Urinary incontinence     Urinary tract infection     Wears glasses     readers     Past Surgical History:   Procedure Laterality Date    ATRIAL APPENDAGE EXCLUSION LEFT WITH TRANSESOPHAGEAL ECHOCARDIOGRAM N/A 11/28/2023    Procedure: Atrial Appendage Occlusion;  Surgeon: Anthony Mcdaniel MD;  Location:  [facility];  Service: Cardiovascular;  Laterality: N/A;    CARDIAC CATHETERIZATION Left 09/29/2022    Procedure: Left Heart Cath;  Surgeon: Titus Oliveros IV, MD;  Location:  [facility];  Service: Cardiovascular;  Laterality: Left;    CARDIOVERSION      CATARACT EXTRACTION Bilateral     COLONOSCOPY      CYSTOSCOPY      ENDOSCOPY      possible    ENDOSCOPY N/A 9/5/2024    Procedure: ESOPHAGOGASTRODUODENOSCOPY;  Surgeon: Anthony Arambula MD;  Location:  [facility];  Service: Gastroenterology;  Laterality: N/A;  Esophagus dilated to 18mm with 12mm-mm to 15mm, then 15mm to 18mm balloon.    ENDOSCOPY N/A 10/31/2024    Procedure: ESOPHAGOGASTRODUODENOSCOPY WITH BIOPSY;  Surgeon: Carlos Dior MD;  Location:  [facility];  Service: Gastroenterology;  Laterality: N/A;    ERCP N/A 9/5/2024    Procedure: ENDOSCOPIC RETROGRADE CHOLANGIOPANCREATOGRAPHY;  Surgeon: Anthony Arambula MD;  Location:  [facility];  Service: Gastroenterology;  Laterality: N/A;  Sphincterotomy made to ampula of common bile duct (CBD), CBD swept with 9mm-12mm balloon - sludge, stones and purulent appearing drainage extracted. 10x7 [place] of [place] biliary stent depolyed into CBD. ERCP scope removed with balloon intact.    ERCP N/A 10/31/2024    Procedure: ENDOSCOPIC RETROGRADE CHOLANGIOPANCREATOGRAPHY;  Surgeon: Carlos Dior MD;  Location:  [facility];  Service: Gastroenterology;  Laterality: N/A;    KIDNEY STONE SURGERY      x1      General Information       Row Name 12/06/24 1015          OT  Time and Intention    Document Type evaluation  -KF     Mode of Treatment occupational therapy  -KF       Row Name 12/06/24 1015          General Information    Patient Profile Reviewed yes  -KF     Prior Level of Function independent:;all household mobility;community mobility;bed mobility;ADL's;driving;mod assist:;home management;cooking  Independent prior, PRN use of rollator with SPC. Pt's daughters assist in IADLs as needed.  -KF     Existing Precautions/Restrictions fall;orthostatic hypotension;other (see comments)  monitor BP, pt asymptomatic  -KF     Barriers to Rehab medically complex  -KF       Row Name 12/06/24 1015          Occupational Profile    Environmental Supports and Barriers (Occupational Profile) walk-in shower  -KF       Row Name 12/06/24 1015          Living Environment    People in Home alone  -KF       Row Name 12/06/24 1015          Home Main Entrance    Number of Stairs, Main Entrance one  -KF     Stair Railings, Main Entrance none  -KF       Row Name 12/06/24 1015          Stairs Within Home, Primary    Number of Stairs, Within Home, Primary none  -KF       Row Name 12/06/24 1015          Cognition    Orientation Status (Cognition) oriented x 4  -KF       Row Name 12/06/24 1015          Safety Issues/Impairments Affecting Functional Mobility    Safety Issues Affecting Function (Mobility) awareness of need for assistance;insight into deficits/self-awareness;safety precaution awareness;safety precautions follow-through/compliance;positioning of assistive device  -KF     Impairments Affecting Function (Mobility) balance;endurance/activity tolerance;strength  -KF               User Key  (r) = Recorded By, (t) = Taken By, (c) = Cosigned By      Initials Name Provider Type    KF Coleen Tsai OT Occupational Therapist                     Mobility/ADL's       Row Name 12/06/24 1016          Bed Mobility    Bed Mobility supine-sit  -KF     Supine-Sit [place] (Bed Mobility) standby assist   -     Assistive Device (Bed Mobility) bed rails;head of bed elevated  -       Row Name 12/06/24 1016          Transfers    Transfers bed-chair transfer;sit-stand transfer;stand-sit transfer  -KF     Comment, (Transfers) Cueing provided for hand placement  -       Row Name 12/06/24 1016          Bed-Chair Transfer    Bed-Chair [place] (Transfers) contact guard;verbal cues  -KF     Assistive Device (Bed-Chair Transfers) walker, front-wheeled  -KF     Comment, (Bed-Chair Transfer) Pt able to stand and take ~3 steps from the EOB to the bedside chair.  -       Row Name 12/06/24 1016          Sit-Stand Transfer    Sit-Stand [place] (Transfers) contact guard;verbal cues  -KF     Assistive Device (Sit-Stand Transfers) walker, front-wheeled  -KF       Row Name 12/06/24 1016          Stand-Sit Transfer    Stand-Sit [place] (Transfers) contact guard;verbal cues  -KF     Assistive Device (Stand-Sit Transfers) walker, front-wheeled  -KF       Row Name 12/06/24 1016          Functional Mobility    Functional Mobility- Comment Additional mobility limited by asymptomatic orthostatic hypotension. Vitals recorded below.  -       Row Name 12/06/24 1016          Activities of Daily Living    BADL Assessment/Intervention upper body dressing;feeding  -       Row Name 12/06/24 1016          Upper Body Dressing Assessment/Training    [place] Level (Upper Body Dressing) don;front opening garment;minimum assist (75% patient effort)  -     Position (Upper Body Dressing) edge of bed sitting  -       Row Name 12/06/24 1016          Self-Feeding Assessment/Training    [place] Level (Feeding) prepare tray/open items;minimum assist (75% patient effort);liquids to mouth;scoop food and bring to mouth;independent  -     Position (Feeding) supported sitting  -               User Key  (r) = Recorded By, (t) = Taken By, (c) = Cosigned By      Initials Name Provider Type    Coleen Suresh OT Occupational  Therapist                   Obj/Interventions       Row Name 12/06/24 1018          Sensory Assessment (Somatosensory)    Sensory Assessment (Somatosensory) UE sensation intact  -KF       Row Name 12/06/24 1018          Range of Motion Comprehensive    General Range of Motion bilateral upper extremity ROM WFL  -KF       Row Name 12/06/24 1018          Strength Comprehensive (MMT)    Comment, General Manual Muscle Testing (MMT) Assessment BUE grossly 4+/5  -KF       Row Name 12/06/24 1018          Balance    Balance Assessment sitting static balance;sitting dynamic balance;sit to stand dynamic balance;standing static balance;standing dynamic balance  -KF     Static Sitting Balance standby assist  -KF     Dynamic Sitting Balance standby assist  -KF     Position, Sitting Balance unsupported;sitting edge of bed  -KF     Sit to Stand Dynamic Balance contact guard;verbal cues  -KF     Static Standing Balance contact guard  -KF     Dynamic Standing Balance contact guard  -KF     Position/Device Used, Standing Balance supported;walker, front-wheeled  -KF     Balance Interventions sitting;standing;sit to stand;supported;static;dynamic;occupation based/functional task  -KF               User Key  (r) = Recorded By, (t) = Taken By, (c) = Cosigned By      Initials Name Provider Type    KF Coleen Tsai, [place] Occupational Therapist                   Goals/Plan       Row Name 12/06/24 1021          Transfer Goal 1 (OT)    Activity/Assistive Device (Transfer Goal 1, OT) commode;bed-to-chair/chair-to-bed  -KF     [place] Level/Cues Needed (Transfer Goal 1, OT) supervision required  -KF     Time Frame (Transfer Goal 1, OT) long term goal (LTG);10 days  -KF     Progress/Outcome (Transfer Goal 1, OT) goal ongoing  -KF       Row Name 12/06/24 1021          Dressing Goal 1 (OT)    Activity/Device (Dressing Goal 1, OT) upper body dressing;lower body dressing  -KF     [place]/Cues Needed (Dressing Goal 1, OT) standby assist  -KF      Time Frame (Dressing Goal 1, OT) short term goal (STG);5 days  -KF     Progress/Outcome (Dressing Goal 1, OT) goal ongoing  -KF       Row Name 12/06/24 1021          Grooming Goal 1 (OT)    Activity/Device (Grooming Goal 1, OT) hair care;oral care;wash face, hands  -KF     [place] (Grooming Goal 1, OT) supervision required  -KF     Time Frame (Grooming Goal 1, OT) long term goal (LTG);10 days  -KF     Strategies/Barriers (Grooming Goal 1, OT) standing sink side  -KF     Progress/Outcome (Grooming Goal 1, OT) goal ongoing  -KF       Row Name 12/06/24 1021          Therapy Assessment/Plan (OT)    Planned Therapy Interventions (OT) activity tolerance training;adaptive equipment training;BADL retraining;functional balance retraining;occupation/activity based interventions;patient/caregiver education/training;ROM/therapeutic exercise;strengthening exercise;transfer/mobility retraining  -KF               User Key  (r) = Recorded By, (t) = Taken By, (c) = Cosigned By      Initials Name Provider Type    KF Coleen Tsai, OT Occupational Therapist                   Clinical Impression       Row Name 12/06/24 1019          Pain Assessment    Pretreatment Pain Rating 0/10 - no pain  -KF     Posttreatment Pain Rating 0/10 - no pain  -KF       Row Name 12/06/24 1019          Plan of Care Review    Plan of Care Reviewed With patient  -KF     Progress no change  -KF     Outcome Evaluation OT evaluation completed. The pt stood and took steps from the EOB to the bedside chair using a RWx with CGA. Additional mobility was limited by orthostatic hypotension, although pt remained asymptomatic. Supine /89, sitting EOB /75, standing BP 85/60, post steps to chair /71. Recommend a d/c home with assist with HHOT when medically ready.  -KF       Row Name 12/06/24 1019          Therapy Assessment/Plan (OT)    Patient/Family Therapy Goal Statement (OT) Restore PLOF  -KF     Rehab Potential (OT) good  -KF      Criteria for Skilled Therapeutic Interventions Met (OT) yes;skilled treatment is necessary  -KF     Therapy Frequency (OT) daily  -KF     Predicted Duration of Therapy Intervention (OT) 7 days  -KF       Row Name 12/06/24 1019          Therapy Plan Review/Discharge Plan (OT)    Anticipated Discharge Disposition (OT) home with assist;home with home health  -KF       Row Name 12/06/24 1019          Vital Signs    Pre Systolic BP Rehab 115  supine  -KF     Pre Treatment Diastolic BP 89  -KF     Intra Systolic BP Rehab 107  sitting  -KF     Intra Treatment Diastolic BP 75  -KF     Post Systolic BP Rehab 85  standing; 104/7 post chair transfer  -KF     Post Treatment Diastolic BP 60  -KF     Pre Patient Position Supine  -KF     Intra Patient Position Standing  -KF     Post Patient Position Sitting  -KF       Row Name 12/06/24 1019          Positioning and Restraints    Pre-Treatment Position in bed  -KF     Post Treatment Position chair  -KF     In Chair notified nsg;reclined;call light within reach;encouraged to call for assist;exit alarm on;waffle cushion;legs elevated  -KF               User Key  (r) = Recorded By, (t) = Taken By, (c) = Cosigned By      Initials Name Provider Type    KF Coleen Tsai, OT Occupational Therapist                   Outcome Measures       Row Name 12/06/24 1021          How much help from another is currently needed...    Putting on and taking off regular lower body clothing? 3  -KF     Bathing (including washing, rinsing, and drying) 3  -KF     Toileting (which includes using toilet bed pan or urinal) 3  -KF     Putting on and taking off regular upper body clothing 3  -KF     Taking care of personal grooming (such as brushing teeth) 3  -KF     Eating meals 3  -KF     AM-PAC 6 Clicks Score (OT) 18  -KF       Row Name 12/06/24 0932          How much help from another person do you currently need...    Turning from your back to your side while in flat bed without using bedrails? 3  -ER      Moving from lying on back to sitting on the side of a flat bed without bedrails? 3  -ER     Moving to and from a bed to a chair (including a wheelchair)? 4  -ER     Standing up from a chair using your arms (e.g., wheelchair, bedside chair)? 4  -ER     Climbing 3-5 steps with a railing? 3  -ER     To walk in hospital room? 3  -ER     AM-PAC 6 Clicks Score (PT) 20  -ER     Highest Level of Mobility Goal 6 --> Walk 10 steps or more  -ER       Row Name 12/06/24 1021 12/06/24 0932       Functional Assessment    Outcome Measure Options AM-PAC 6 Clicks Daily Activity (OT)  -KF AM-PAC 6 Clicks Basic Mobility (PT)  -ER              User Key  (r) = Recorded By, (t) = Taken By, (c) = Cosigned By      Initials Name Provider Type    ER Cynthia Allison, PT Physical Therapist    Coleen Suresh, OT Occupational Therapist                    Occupational Therapy Education       Title: PT OT SLP Therapies (In Progress)       Topic: Occupational Therapy (In Progress)       Point: ADL training (Done)       Description:   Instruct learner(s) on proper safety adaptation and remediation techniques during self care or transfers.   Instruct in proper use of assistive devices.                  Learning Progress Summary            Patient Acceptance, E,TB, VU,DU by KF at 12/6/2024 0846                      Point: Home exercise program (Not Started)       Description:   Instruct learner(s) on appropriate technique for monitoring, assisting and/or progressing therapeutic exercises/activities.                  Learner Progress:  Not documented in this visit.              Point: Precautions (Done)       Description:   Instruct learner(s) on prescribed precautions during self-care and functional transfers.                  Learning Progress Summary            Patient Acceptance, E,TB, VU,DU by KF at 12/6/2024 0846                      Point: Body mechanics (Done)       Description:   Instruct learner(s) on proper positioning and spine alignment  during self-care, functional mobility activities and/or exercises.                  Learning Progress Summary            Patient Acceptance, E,TB, VU,DU by  at 12/6/2024 0846                                      User Key       Initials Effective Dates Name Provider Type Discipline     08/09/23 -  Coleen Tsai, OT Occupational Therapist OT                  OT Recommendation and Plan  Planned Therapy Interventions (OT): activity tolerance training, adaptive equipment training, BADL retraining, functional balance retraining, occupation/activity based interventions, patient/caregiver education/training, ROM/therapeutic exercise, strengthening exercise, transfer/mobility retraining  Therapy Frequency (OT): daily  Plan of Care Review  Plan of Care Reviewed With: patient  Progress: no change  Outcome Evaluation: OT evaluation completed. The pt stood and took steps from the EOB to the bedside chair using a RWx with CGA. Additional mobility was limited by orthostatic hypotension, although pt remained asymptomatic. Supine /89, sitting EOB /75, standing BP 85/60, post steps to chair /71. Recommend a d/c home with assist with HHOT when medically ready.     Time Calculation:   Evaluation Complexity (OT)  Review Occupational Profile/Medical/Therapy History Complexity: brief/low complexity  Assessment, Occupational Performance/Identification of Deficit Complexity: 1-3 performance deficits  Clinical Decision Making Complexity (OT): problem focused assessment/low complexity  Overall Complexity of Evaluation (OT): low complexity     Time Calculation- OT       Row Name 12/06/24 1022             Time Calculation- OT    OT Start Time 0846  -KF      OT Received On 12/06/24  -KF      OT Goal Re-Cert Due Date 12/16/24  -KF         Untimed Charges    OT Eval/Re-eval Minutes 48  -KF         Total Minutes    Untimed Charges Total Minutes 48  -KF       Total Minutes 48  -KF                User Key  (r) = Recorded By,  (t) = Taken By, (c) = Cosigned By      Initials Name Provider Type    KF Coleen Tsai OT Occupational Therapist                  Therapy Charges for Today       Code Description Service Date Service Provider Modifiers Qty    [id number]  OT EVAL LOW COMPLEXITY 4 12/6/2024 Coleen Tsai OT GO 1                 Coleen Tsai OT  12/6/2024   Debridement Text: The wound edges were debrided prior to proceeding with the closure to facilitate wound healing.

## 2024-12-06 NOTE — PLAN OF CARE
Goal Outcome Evaluation:  Plan of Care Reviewed With: patient        Progress: no change  Outcome Evaluation: Rahman catheter placed this shift. Resistance met with catheter. Patient reports similar difficulty when self catheterizing at home. Successful rahman placement after third attempt.  Clear yellow returned. Vitals stable. Remains in A. Fib with rate fluctuating from low 100's to 120's. Patient ambulated to bathroom. No dizziness reported with sitting or standing.

## 2024-12-06 NOTE — PROGRESS NOTES
Malnutrition Severity Assessment    Patient Name:  Darien Camarena  YOB: 1935  MRN: [id number]  Admit Date:  12/5/2024    Patient meets criteria for : Severe Malnutrition    Comments:  severe malnutrition/chronic illness     Malnutrition Severity Assessment  Malnutrition Type: Chronic Disease - Related Malnutrition  Malnutrition Type (Last 8 Hours)       Malnutrition Severity Assessment       Row Name 12/06/24 1313       Malnutrition Severity Assessment    Malnutrition Type Chronic Disease - Related Malnutrition      Row Name 12/06/24 1313       Insufficient Energy Intake     Insufficient Energy Intake Findings Severe    Insufficient Energy Intake  <75% of est. energy requirement for > or equal to 1 month      Row Name 12/06/24 1313       Unintentional Weight Loss     Unintentional Weight Loss Findings Severe    Unintentional Weight Loss  Weight loss greater than 10% in six months      Row Name 12/06/24 1313       Muscle Loss    Loss of Muscle Mass Findings Moderate    [place] Severe - deep hollowing/scooping, lack of muscle to touch, facial bones well defined    Clavicle Bone Region Moderate - some protrusion in females, visible in males    Acromion Bone Region Moderate - acromion may slightly protrude    Scapular Bone Region Moderate - mild depression, bones may show slightly    Dorsal Hand Region Moderate - slight depression    Patellar Region Moderate - patella more prominent, less muscle definition around patella    Anterior Thigh Region Moderate - mild depression on inner thigh    Posterior Calf Region Moderate - some roundness, slight firmness      Row Name 12/06/24 1313       Fat Loss    Subcutaneous Fat Loss Findings Moderate    Orbital Region  Severe - pronounced hollowness/depression, dark circles, loose saggy skin    Upper Arm Region Moderate - some fat tissue, not ample    Thoracic & Lumbar Region Moderate - ribs visible with mild depressions, iliac crest somewhat prominent       Row Name 12/06/24 1313       Criteria Met (Must meet criteria for severity in at least 2 of these categories: M Wasting, Fat Loss, Fluid, Secondary Signs, Wt. Status, Intake)    Patient meets criteria for  Severe Malnutrition                    Electronically signed by:  Missy Schwarz RD  12/06/24 13:14 EST

## 2024-12-06 NOTE — PROGRESS NOTES
"          Clinical Nutrition Assessment     Patient Name: Darien Camarena  YOB: 1935  MRN: [id number]  Date of Encounter: 12/06/24 13:03 EST  Admission date: 12/5/2024  Reason for Visit: Identified at risk by screening criteria, MST score 2+    Assessment   Nutrition Assessment   Admission Diagnosis:  A-fib [I48.91]    Problem List:    Orthostatic hypotension    Essential hypertension    Stage 3 chronic kidney disease    Heart failure with mildly reduced ejection fraction (HFmrEF)    A-fib    Acute on chronic urinary retention      PMH:   He  has a past medical history of Arrhythmia, Atrial fibrillation, Chronic kidney disease, COPD (chronic obstructive pulmonary disease), Coronary artery disease, Dementia, Diabetes mellitus, E. coli sepsis (06/23/2022), Enlarged prostate without lower urinary tract symptoms (luts) (06/20/2016), Full dentures, GERD (gastroesophageal reflux disease), Gout, Hearing aid worn, History of colonoscopy (09/12/2012), History of radiation therapy (02/24/2023), [place] (hard of hearing), Hyperlipidemia, Hypertension, Kidney stone, Lung cancer, STEVEN on CPAP, PAF (paroxysmal atrial fibrillation), Prostatism, Urinary incontinence, Urinary tract infection, and Wears glasses.    PSH:  He  has a past surgical history that includes Kidney stone surgery; Cataract extraction (Bilateral); Cardioversion; Cardiac catheterization (Left, 09/29/2022); Cystoscopy; Colonoscopy; Esophagogastroduodenoscopy; Atrial Appendage Exclusion Left (N/A, 11/28/2023); Esophagogastroduodenoscopy (N/A, 9/5/2024); ERCP (N/A, 9/5/2024); ERCP (N/A, 10/31/2024); and Esophagogastroduodenoscopy (N/A, 10/31/2024).    Applicable Nutrition History:       Anthropometrics     Height: Height: 190.5 cm (75\")  Last Filed Weight: Weight: 94.8 kg (209 lb) (12/05/24 1615)  Method: Weight Method: Stated  BMI: BMI (Calculated): 26.1    UBW: 260lb    Weight change: weight loss of 47 lbs (18%) over 9 month(s)    Significant?  " Yes    Nutrition Focused Physical Exam    Date: 12/6    Patient meets criteria for malnutrition diagnosis, see MSA note.     Subjective   Reported/Observed/Food/Nutrition Related History:   12/6  Patient triggered for nutrition assessment due to weight loss and decreased intake.  Per EMR, patient with visit outpatient RD due to weight loss (in October). Patient had reported about 40lb weight loss in 7 months.   Noted with on going weigh loss per documentation.    Patient in bedside chair at time of visit, daughter in the room as well. Daughter reports weight loss started mostly after patient getting thrush that affected his taste for food and therefore affected his overall intake (started several months ago).   Appetite started to come back and then declined again the past couple of weeks due to current illness.  Daughter reports patient lives alone and has been having a difficult time preparing his meals. She has set him up with meals on wheels as of  3 weeks. Patient likes the meals.  He tried ONS, only likes Boost Breeze.  We discussed other OTC supplement options for post discharge.  We discussed RD finding of MSA.       Current Nutrition Prescription   PO: Diet: Cardiac; Healthy Heart (2-3 Na+); Fluid Consistency: Thin (IDDSI 0)  Oral Nutrition Supplement:   Intake: Insufficient data    Assessment & Plan   Nutrition Diagnosis   Date:  12/6 Updated:  Problem Malnutrition, chronic severe   Etiology Oral thrush; UTI (intake < needs)    Signs/Symptoms <75% of EEN x >/= 1 month, significant weight loss of 18% in 9 months , mild/moderate muscle wasting, and mild/moderate subcutaneous fat loss   Status: New      Goal / Objectives:   Nutrition to support treatment and Establish PO      Nutrition Intervention      Follow treatment progress, Care plan reviewed, Interview for preferences, Menu provided, Encourage intake, Supplement provided    - Meets criteria for MSA/severe/chronic   - Boost Breeze TID   - Boost plus  daily with dinner (patient wants to try again, might consider drinking at home)  - D/C renal, diabetic restriction from diet order - liberalize diet to improve intake (such restriction not warranted at this time.)  - We discussed other OTC supplement options for post discharge.     -We discussed RD finding of MSA.     Monitoring/Evaluation:   Per protocol, I&O, PO intake, Supplement intake, Pertinent labs    Missy Schwarz, SOPHIA  Time Spent: 30min

## 2024-12-06 NOTE — CASE MANAGEMENT/SOCIAL WORK
Continued Stay Note  [facility]     Patient Name: Darien Camarena  MRN: [id number]  Today's Date: 12/6/2024    Admit Date: 12/5/2024    Plan: Rehab   Discharge Plan       Row Name 12/06/24 1307       Plan    Plan Rehab    Plan Comments Per discussion in MDR, lacey has good UOP. He has been in AFib and is on room air. He is receiving IV Abx. He has had orthostatic hypotension and is receiving IVFs. He may ultimately require a L nephrostomy tube. He walked 3ft with contact guard and a walker today. With verbal consent, I spoke with Pt and daughter/POA, in room, regarding discharge plan. Pt lives alone. We discussed safety concerns including fall risk with him returning home alone. Pt is agreeable to rehab. I explained Pt and daughter would need to decide if they want STR to home or STR to LTC. I explained that would determine which facility is most appropriate since some facilities only offer short term rehab (/[facility]). I also explained some facilties, including the [facility], have a long waiting list for LTC placement. I provided them a Pt choice list and my contact information. I suggested they visit medicare.gov to explore facility performance review details and suggested daughter do on-site visits. Daughter stated she would contact me with their top 3 choices. CM will continue to follow for medical readiness.    Final Discharge Disposition Code 03 - skilled nursing facility (SNF)                   Discharge Codes    No documentation.                 Expected Discharge Date and Time       Expected Discharge Date Expected Discharge Time    Dec 7, 2024               Pat Lake RN

## 2024-12-06 NOTE — PLAN OF CARE
Goal Outcome Evaluation:  Plan of Care Reviewed With: patient           Outcome Evaluation: PT eval complete. Pt demonstrates some signs of orthostatic hypotension in standing. Pt presents below functional baseline for mobility due to BP variations. He will benefit from skilled PT while at [facility] [place]. Recommend home with assist once medically stable to d/c.    Anticipated Discharge Disposition (PT): home with assist

## 2024-12-06 NOTE — PROGRESS NOTES
[facility] Medicine Services  PROGRESS NOTE    Patient Name: Darein Camarena  : 1935  MRN: [id number]    Date of Admission: 2024  Primary Care Physician: Pito Way MD    Subjective   Subjective     CC:  Follow-up for weakness    HPI:  Patient seen and examined this morning.  Feeling much better already.  Had a Del Cid catheter placed and scheduled for CT scan abdomen today.  Neurology.  Still somewhat orthostatic.      Objective   Objective     Vital Signs:   Temp:  [97.7 °F (36.5 °C)-98 °F (36.7 °C)] 97.8 °F (36.6 °C)  Heart Rate:  [] 110  Resp:  [16-22] 16  BP: ()/(44-95) 108/74     Physical Exam:  General: Comfortable, not in distress, conversant and cooperative  Head: Atraumatic and normocephalic  Eyes: No Icterus. No pallor  Ears:  Ears appear intact with no abnormalities noted  Throat: No oral lesions, no thrush  Neck: Supple, trachea midline  Lungs: Clear to auscultation bilaterally, equal air entry, no wheezing or crackles  Heart:  Normal S1 and S2, no murmur, no gallop, No JVD, no lower extremity swelling  Abdomen:  Soft, no tenderness, no organomegaly, normal bowel sounds, no organomegaly  Extremities: pulses equal bilaterally  Skin: No bleeding, bruising or rash, normal skin turgor and elasticity  Neurologic: Cranial nerves appear intact with no evidence of facial asymmetry, normal motor and sensory functions in all 4 extremities  Psych: Alert and oriented x 3, normal mood    Results Reviewed:  LAB RESULTS:      Lab 24  1350 24  1034   WBC  --  9.69   HEMOGLOBIN  --  13.6   HEMATOCRIT  --  43.7   PLATELETS  --  252   NEUTROS ABS  --  7.39*   IMMATURE GRANS (ABS)  --  0.03   LYMPHS ABS  --  1.47   MONOS ABS  --  0.48   EOS ABS  --  0.25   MCV  --  100.0*   PROCALCITONIN  --  0.05   LACTATE 2.9* 3.0*   HSTROP T  --  36*         Lab 24  1034   SODIUM 142   POTASSIUM 5.0   CHLORIDE 100   CO2 29.0   ANION GAP 13.0   BUN 40*    CREATININE 1.19   EGFR 58.8*   GLUCOSE 106*   CALCIUM 9.7   MAGNESIUM 1.7         Lab 12/05/24  1034   TOTAL PROTEIN 7.7   ALBUMIN 3.5   GLOBULIN 4.2   ALT (SGPT) 18   AST (SGOT) 41*   BILIRUBIN 0.9   ALK PHOS 221*         Lab 12/05/24  1034   HSTROP T 36*                 Brief Urine Lab Results  (Last result in the past 365 days)        Color   Clarity   Blood   Leuk Est   Nitrite   Protein   CREAT   Urine HCG        12/05/24 1432 Yellow   Clear   Negative   Moderate (2+)   Positive   Negative                   Microbiology Results Abnormal       None            XR Chest 1 View    Result Date: 12/5/2024  XR CHEST 1 VW Date of Exam: 12/5/2024 11:07 AM EST Indication: Weak/Dizzy/AMS triage protocol Comparison: 11/6/2024 Findings: Left lower lobe infiltrate has improved with minimal infiltrate remaining. Heart size is normal. Right lung is clear. There are no effusions.     Impression: Impression: Improved left lower lobe infiltrate. No new abnormality. Electronically Signed: Aster Mathur MD  12/5/2024 11:36 AM EST  Workstation ID: [id number]     Results for orders placed during the hospital encounter of 11/21/24    Adult Transesophageal Echo 3D (FARHAD) W/ Cont If Necessary Per Protocol    Interpretation Summary    There is a 27mm Watchman FLX device in place. It appears well seated and well compressed with no device related thrombus seen. There is a small jet of color flow, 2.5 mm, at the superior aspect adjacent to the left upper pulmonary vein. This was not seen previous examination however image quality is improved compared to prior.    Left ventricular systolic function is moderately decreased. Left ventricular ejection fraction appears to be 36 - 40%. Normal left ventricular wall thickness noted. The left ventricular cavity is dilated. There is left ventricular global hypokinesis noted.    : The right ventricular cavity is mildly dilated. Mildly reduced right ventricular systolic function noted.    : The left  atrial cavity is severely dilated. No evidence of a left atrial thrombus present. There is light spontaneous echo contrast present in the atrial body.    The right atrial cavity is severely dilated.    There is mild, bileaflet mitral valve thickening present. Mild mitral valve regurgitation is present. No significant mitral valve stenosis is present.    Mild tricuspid valve regurgitation is present. Estimated right ventricular systolic pressure from tricuspid regurgitation is mildly elevated (35-45 mmHg).    There is moderate pulmonic valve regurgitation present.      Current medications:  Scheduled Meds:donepezil, 10 mg, Oral, Nightly  finasteride, 5 mg, Oral, Daily  insulin lispro, 2-7 Units, Subcutaneous, 4x Daily AC & at Bedtime  memantine, 10 mg, Oral, BID  pantoprazole, 40 mg, Oral, Q AM  pravastatin, 40 mg, Oral, Daily  sertraline, 50 mg, Oral, Daily  sodium chloride, 10 mL, Intravenous, Q12H      Continuous Infusions:sodium chloride, 75 mL/hr, Last Rate: 75 mL/hr (12/05/24 1826)      PRN Meds:.  Albuterol Sulfate NEB Orderable    senna-docusate sodium **AND** polyethylene glycol **AND** bisacodyl **AND** bisacodyl    Calcium Replacement - Follow Nurse / BPA Driven Protocol    dextrose    dextrose    glucagon (human recombinant)    Magnesium Standard Dose Replacement - Follow Nurse / BPA Driven Protocol    ondansetron    Phosphorus Replacement - Follow Nurse / BPA Driven Protocol    Potassium Replacement - Follow Nurse / BPA Driven Protocol    sodium chloride    sodium chloride    sodium chloride    Assessment & Plan   Assessment & Plan     Active Hospital Problems    Diagnosis  POA    **Orthostatic hypotension [I95.1]  Yes    A-fib [I48.91]  Yes    Acute on chronic urinary retention [R33.9]  Yes    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]  Yes    Stage 3 chronic kidney disease [N18.30]  Yes    Essential hypertension [I10]  Yes      Resolved Hospital Problems   No resolved  problems to display.        Brief Hospital Course to date:  Darien Camarena is a 89 y.o. male with history of chronic atrial fibrillation s/p watchman, CKD stage III, chronic urinary retention with self caths, HFmr EF, type 2 diabetes, hypertension hyperlipidemia.  Patient sent from urology clinic with complaints of dizziness and orthostatic hypotension    Acute UTI, POA  Chronic urine retention  Patient with known history of chronic retention.  He self cath 4 times a day  Seen at urology clinic and sent to the hospital because of hypotension  UA is concerning for UTI.  Started on IV Rocephin.  Monitor urine cultures  Urology following.  Considering nephrostomy tube he continues to have persistent left hydronephrosis despite Del Cid catheter placement    Orthostatic hypotension  Likely secondary to poor oral intake over the last few days   Partially improved with IV fluids  Metoprolol and Lasix held    HFmrEF, compensated  Paroxysmal A-fib  Echo from 11/21 with EF 35-to 40%  Recently had a recent increase in Lasix due to lower extremity edema.  Lasix currently on hold because of hypotension  Metoprolol held because of hypotension  Started on amiodarone for rate control  Not on anticoagulation.  Status post Watchman device  Appreciate help from cardiology team    CKD stage III  Creatinine stable at baseline.  Continue to monitor     Well-controlled type 2 diabetes   A1c 5.9%  Continue SSI      Dementia and depression  Continue Aricept and Namenda  Continue Zoloft       Expected Discharge Location and Transportation: Home  Expected Discharge   Expected Discharge Date: 12/7/2024; Expected Discharge Time:      VTE Prophylaxis:  Mechanical VTE prophylaxis orders are present.         AM-PAC 6 Clicks Score (PT): 12 (12/05/24 2130)    CODE STATUS:   There are no questions and answers to display.       Clarence Samaniego MD  12/06/24

## 2024-12-06 NOTE — PLAN OF CARE
Goal Outcome Evaluation:   - 130 BPM. Afib RVR on tele monitor. Provider made aware. Pt denies dizziness, palpitations, headache, nausea, vomiting, SOB, chest pain. Continues to be on NS @75 mL/hr. Pt denies any needs at this time.                                          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 He states that Anders talks to him through the television (here at the hospital) and through the radio (when in his tent). Anders tells the pt what to do regarding his daily activities and is the reason why the pt is no longer working (previous ). The pt denies any ideas of self harm or harm to others.   - no noted history of psych evals or hospitalizations

## 2024-12-06 NOTE — CONSULTS
[facility] at [facility]  Cardiovascular Consultation Note    Reason for consultation: #1 A-fib RVR #2 orthostatic hypotension 3 history of heart failure with reduced EF    History of Present Illness:  89-year-old male with permanent A-fib status post watchman, heart failure with reduced EF, chronic prostate problems requiring In-N-Out cath, frequent UTIs, diabetes, CKD, hypertension, hyperlipidemia has chronically elevated high sensitive troponin.  The patient was sent over here yesterday from his urologist office because of dizziness and weakness and had a systolic blood pressure of 67.  Here vital signs were checked and the patient has orthostasis the patient was last seen in our office on 11/18/2024 patient had negative cardiac cath 2022.  The patient acknowledges at home whenever he stood up he felt very lightheaded.  He states that after receiving IV fluids this is resolved.  He denies any history of lower extremity edema.  He denies any dyspnea on exertion.  He denies tightness heaviness squeezing pressure chest jaw throat arms.  He occasionally wheezes which responds promptly to inhalers.  Patient states he feels really good.  He denies palpitations despite having a rapid heartbeat    Cardiac risk factors: #1 male sex #2 age greater than 55 #3 hypertension #4 hyperlipidemia #5 diabetes  [id number] score is 5-status post watchman    Past medical and surgical history, social and family history reviewed in EMR.    REVIEW OF SYSTEMS:     Past Medical History:   Diagnosis Date    Arrhythmia     Atrial fibrillation     Chronic kidney disease     COPD (chronic obstructive pulmonary disease)     Coronary artery disease     Dementia     Diabetes mellitus     doesnt check sugar    E. coli sepsis 06/23/2022    Enlarged prostate without lower urinary tract symptoms (luts) 06/20/2016    Full dentures     GERD (gastroesophageal reflux disease)     Gout     Hearing aid worn     bilat prn     History of colonoscopy 09/12/2012    History of radiation therapy 02/24/2023    SBRT LLL lung    [place] (hard of hearing)     hearing aids prn    Hyperlipidemia     Hypertension     Kidney stone     surgery x1    Lung cancer     STEVEN on CPAP     compliant with machine    PAF (paroxysmal atrial fibrillation)     Prostatism     Urinary incontinence     Urinary tract infection     Wears glasses     readers     Past Surgical History:   Procedure Laterality Date    ATRIAL APPENDAGE EXCLUSION LEFT WITH TRANSESOPHAGEAL ECHOCARDIOGRAM N/A 11/28/2023    Procedure: Atrial Appendage Occlusion;  Surgeon: Anthony Mcdaniel MD;  Location:  [facility];  Service: Cardiovascular;  Laterality: N/A;    CARDIAC CATHETERIZATION Left 09/29/2022    Procedure: Left Heart Cath;  Surgeon: Titus Oliveros IV, MD;  Location:  [facility];  Service: Cardiovascular;  Laterality: Left;    CARDIOVERSION      CATARACT EXTRACTION Bilateral     COLONOSCOPY      CYSTOSCOPY      ENDOSCOPY      possible    ENDOSCOPY N/A 9/5/2024    Procedure: ESOPHAGOGASTRODUODENOSCOPY;  Surgeon: Anthony Arambula MD;  Location:  [facility];  Service: Gastroenterology;  Laterality: N/A;  Esophagus dilated to 18mm with 12mm-mm to 15mm, then 15mm to 18mm balloon.    ENDOSCOPY N/A 10/31/2024    Procedure: ESOPHAGOGASTRODUODENOSCOPY WITH BIOPSY;  Surgeon: Carlos Dior MD;  Location:  [facility];  Service: Gastroenterology;  Laterality: N/A;    ERCP N/A 9/5/2024    Procedure: ENDOSCOPIC RETROGRADE CHOLANGIOPANCREATOGRAPHY;  Surgeon: Anthony Arambula MD;  Location:  [facility];  Service: Gastroenterology;  Laterality: N/A;  Sphincterotomy made to ampula of common bile duct (CBD), CBD swept with 9mm-12mm balloon - sludge, stones and purulent appearing drainage extracted. 10x7 Burmese biliary stent depolyed into CBD. ERCP scope removed with balloon intact.    ERCP N/A 10/31/2024    Procedure: ENDOSCOPIC RETROGRADE  CHOLANGIOPANCREATOGRAPHY;  Surgeon: Carlos Dior MD;  Location: [facility]COPY;  Service: Gastroenterology;  Laterality: N/A;    KIDNEY STONE SURGERY      x1     Social History     Socioeconomic History    Marital status:    Tobacco Use    Smoking status: Former     Current packs/day: 0.00     Average packs/day: 1 pack/day for 15.0 years (15.0 ttl pk-yrs)     Types: Cigarettes     Start date: 1947     Quit date: 1960     Years since quittin.6     Passive exposure: Past    Smokeless tobacco: Former     Types: Chew     Quit date:     Tobacco comments:     started at 13 yo.   Vaping Use    Vaping status: Never Used    Passive vaping exposure: Yes   Substance and Sexual Activity    Alcohol use: No    Drug use: Never    Sexual activity: Defer     Partners: Female     Family History   Problem Relation Age of Onset    Alzheimer's disease Mother     COPD Father     Lung cancer Father     Cancer Father     Kidney disease Father     No Known Problems Daughter     No Known Problems Daughter     No Known Problems Daughter        H&P ROS reviewed and pertinent CV ROS as noted in HPI.    Cardiac: Has known permanent atrial fibrillation and heart failure with reduced EF.  He denies shortness of breath no anginal type pain no edema  Respiratory: Has COPD/wheezes occasionally but this responds to inhaler  Lower Extremities: Denies edema  GI: No nausea vomiting diarrhea bright blood per rectum or melena  Neuro: No history of stroke TIA or neurologic event  Hematology: Has history of anemia  Renal: History of CKD  Musculoskeletal: Does not complain of any specific joint pain  Endocrine: Has diabetes but no hypothyroidism  Constitutional: States his appetite is fine.  Denies fever chills  Psych: Has dementia but no tarah depression      Physical Exam: General Very pleasant well-developed male sitting in bed not dyspneic not tachypneic but is tachycardic       HEENT: No JVP.  No bruits.  No  icterus       Respiratory: Equal bilateral symmetrical expansion with a rare crackle on the left.  No wheezes or rhonchi       Cardiovascular: Tachycardic and irregular without any obvious murmur no edema to palpation       GI: Soft and flat       Lower Extremities: No lesions       Neuro: Facial expressions are symmetrical moves all 4 extremities       Skin: Warm and dry no edema palpation       Psych: Pleasant affect    Results Review: EKG shows A-fib RVR nonspecific ST abnormalities with no significant change from prior EKGs.  HST is elevated but they are chronically elevated.  BNP is elevated.  GFR is 58 the most recent one prior to that was 61.9.  Hemoglobin is 13.6.  The BNP level is similar to previous BNP June 2024           Vital Sign Min/Max for last 24 hours  Temp  Min: 97.7 °F (36.5 °C)  Max: 98 °F (36.7 °C)   BP  Min: 67/44  Max: 122/78   Pulse  Min: 67  Max: 131   Resp  Min: 16  Max: 22   SpO2  Min: 90 %  Max: 99 %   No data recorded      Intake/Output Summary (Last 24 hours) at 12/6/2024 0839  Last data filed at 12/6/2024 0510  Gross per 24 hour   Intake 1100 ml   Output 800 ml   Net 300 ml             Current Facility-Administered Medications:     albuterol (PROVENTIL) nebulizer solution 0.083% 2.5 mg/3mL, 2.5 mg, Nebulization, Q4H PRN, [facility]    sennosides-docusate (PERICOLACE) 8.6-50 MG per tablet 2 tablet, 2 tablet, Oral, BID PRN **AND** polyethylene glycol (MIRALAX) packet 17 g, 17 g, Oral, Daily PRN **AND** bisacodyl (DULCOLAX) EC tablet 5 mg, 5 mg, Oral, Daily PRN **AND** bisacodyl (DULCOLAX) suppository 10 mg, 10 mg, Rectal, Daily PRN, Junaid Acosta MD    Calcium Replacement - Follow Nurse / BPA Driven Protocol, , Not Applicable, PRN, Junaid Acosta MD    dextrose (D50W) (25 g/50 mL) IV injection 25 g, 25 g, Intravenous, Q15 Min PRN, Junaid Acosta MD    dextrose (GLUTOSE) oral gel 15 g, 15 g, Oral, Q15 Min PRN, Junaid Acosta MD    donepezil (ARICEPT) tablet 10 mg, 10 mg,  Oral, Nightly, Junaid Acosta MD, 10 mg at 12/05/24 2139    ertapenem (INVanz) 1,000 mg in sodium chloride 0.9 % 100 mL MBP, 1,000 mg, Intravenous, Q24H, Clarence Samaniego MD    finasteride (PROSCAR) tablet 5 mg, 5 mg, Oral, Daily, Junaid Acosta MD, 5 mg at 12/05/24 1749    glucagon (GLUCAGEN) injection 1 mg, 1 mg, Intramuscular, Q15 Min PRN, Junaid Acosta MD    Insulin Lispro (humaLOG) injection 2-7 Units, 2-7 Units, Subcutaneous, 4x Daily AC & at Bedtime, Junaid Acosta MD    Magnesium Standard Dose Replacement - Follow Nurse / BPA Driven Protocol, , Not Applicable, PRN, Junaid Acosta MD    memantine (NAMENDA) tablet 10 mg, 10 mg, Oral, BID, Junaid Acosta MD, 10 mg at 12/05/24 2139    ondansetron (ZOFRAN) injection 4 mg, 4 mg, Intravenous, Q6H PRN, Junaid Acosta MD    pantoprazole (PROTONIX) EC tablet 40 mg, 40 mg, Oral, Q AM, Junaid Acosta MD, 40 mg at 12/06/24 0447    Phosphorus Replacement - Follow Nurse / BPA Driven Protocol, , Not Applicable, PRNKarla Wycliffe, MD    Potassium Replacement - Follow Nurse / BPA Driven Protocol, , Not Applicable, PRN, Junaid Acosta MD    pravastatin (PRAVACHOL) tablet 40 mg, 40 mg, Oral, Daily, Junaid Acosta MD    sertraline (ZOLOFT) tablet 50 mg, 50 mg, Oral, Daily, Junaid Acosta MD    sodium chloride 0.9 % flush 10 mL, 10 mL, Intravenous, PRN, Alexx Araujo MD    sodium chloride 0.9 % flush 10 mL, 10 mL, Intravenous, Q12H, Junaid Acosta MD    sodium chloride 0.9 % flush 10 mL, 10 mL, Intravenous, PRN, Junaid Acosta MD    sodium chloride 0.9 % infusion 40 mL, 40 mL, Intravenous, PRN, Junaid Acosta MD    sodium chloride 0.9 % infusion, 75 mL/hr, Intravenous, Continuous, Junaid Acosta MD, Last Rate: 75 mL/hr at 12/05/24 1826, 75 mL/hr at 12/05/24 1826    Lab Review:   Results from last 7 days   Lab Units 12/05/24  1034   WBC 10*3/mm3 9.69   HEMOGLOBIN g/dL 13.6   PLATELETS 10*3/mm3 252     Results from last 7 days   Lab Units 12/05/24  1034    SODIUM mmol/L 142   POTASSIUM mmol/L 5.0   CO2 mmol/L 29.0   BUN mg/dL 40*   CREATININE mg/dL 1.19   MAGNESIUM mg/dL 1.7   GLUCOSE mg/dL 106*     Estimated Creatinine Clearance: 57.5 mL/min (by C-G formula based on SCr of 1.19 mg/dL).        .lipd  Lab Results   Component Value Date    TRIG 83 05/07/2024    HDL 26 (L) 05/07/2024    AST 41 (H) 12/05/2024    ALT 18 12/05/2024       Radiology Reports:  Imaging Results (Last 72 Hours)       Procedure Component Value Units Date/Time    XR Chest 1 View [[id number]] Collected: 12/05/24 1134     Updated: 12/05/24 1139    Narrative:      XR CHEST 1 VW    Date of Exam: 12/5/2024 11:07 AM EST    Indication: Weak/Dizzy/AMS triage protocol    Comparison: 11/6/2024    Findings:  Left lower lobe infiltrate has improved with minimal infiltrate remaining. Heart size is normal. Right lung is clear. There are no effusions.      Impression:      Impression:  Improved left lower lobe infiltrate. No new abnormality.      Electronically Signed: Aster Mathur MD    12/5/2024 11:36 AM EST    Workstation ID: [id number]            Assessment/Plan: 1 A-fib RVR-the patient's heart rate is significantly elevated even just sitting in bed eating breakfast.  Unfortunately his blood pressure is tenuous so I cannot really increase his AV prabhu agents.  In addition of that he has CKD and I would like to avoid digoxin.  In addition he has orthostatic hypotension symptoms.  Although not ideal I will start the patient on p.o. amiodarone for better rate control.  If he has significant orthostatic hypotension following hydration we will have to lower his dose of beta-blockers.  2 orthostatic hypotension-clinically the patient is improved have ordered a repeat take of orthostatic vitals this morning  3 heart failure with reduced EF patient appears euvolemic i.e. no JVP, no shortness of breath  4 chronically elevated high-sensitivity troponin-patient had nonobstructive coronary disease in 2022, he denies  tightness heaviness squeezing pressure chest jaw throat arms.  No ischemic evaluation is warranted      Jose Dennis MD  12/06/24  08:39 EST

## 2024-12-06 NOTE — PLAN OF CARE
Goal Outcome Evaluation:  Plan of Care Reviewed With: patient        Progress: no change  Outcome Evaluation: OT evaluation completed. The pt stood and took steps from the EOB to the bedside chair using a RWx with CGA. Additional mobility was limited by orthostatic hypotension, although pt remained asymptomatic. Supine /89, sitting EOB /75, standing BP 85/60, post steps to chair /71. Recommend a d/c home with assist with HHOT when medically ready.    Anticipated Discharge Disposition (OT): home with assist, home with home health

## 2024-12-06 NOTE — THERAPY EVALUATION
Patient Name: Darien Camarena  : 1935    MRN: [id number]                              Today's Date: 2024       Admit Date: 2024    Visit Dx:     ICD-10-CM ICD-9-CM   1. Orthostasis  I95.1 458.0   2. Light headedness  R42 780.4   3. Chronic atrial fibrillation with rapid ventricular response  I48.20 427.31   4. Acute on chronic urinary retention  R33.9 788.29     Patient Active Problem List   Diagnosis    Type 2 diabetes mellitus with stage 3 chronic kidney disease, without long-term current use of insulin    Gastroesophageal reflux disease with esophagitis    Hyperlipidemia LDL goal <100    Essential hypertension    Nephrolithiasis    Obstructive sleep apnea syndrome    Permanent atrial fibrillation    Stage 3 chronic kidney disease    Coronary artery disease involving native coronary artery of native heart without angina pectoris    Malignant neoplasm of lower lobe of left lung    H/O: GI bleed    Presence of Watchman left atrial appendage closure device    Orthostatic hypotension    Heart failure with mildly reduced ejection fraction (HFmrEF)    Obesity    Choledocholithiasis    Severe malnutrition    Esophagitis    Encounter for removal of biliary stent    A-fib    Acute on chronic urinary retention     Past Medical History:   Diagnosis Date    Arrhythmia     Atrial fibrillation     Chronic kidney disease     COPD (chronic obstructive pulmonary disease)     Coronary artery disease     Dementia     Diabetes mellitus     doesnt check sugar    E. coli sepsis 2022    Enlarged prostate without lower urinary tract symptoms (luts) 2016    Full dentures     GERD (gastroesophageal reflux disease)     Gout     Hearing aid worn     bilat prn    History of colonoscopy 2012    History of radiation therapy 2023    SBRT LLL lung    [place] (hard of hearing)     hearing aids prn    Hyperlipidemia     Hypertension     Kidney stone     surgery x1    Lung cancer     STEVEN on CPAP      compliant with machine    PAF (paroxysmal atrial fibrillation)     Prostatism     Urinary incontinence     Urinary tract infection     Wears glasses     readers     Past Surgical History:   Procedure Laterality Date    ATRIAL APPENDAGE EXCLUSION LEFT WITH TRANSESOPHAGEAL ECHOCARDIOGRAM N/A 11/28/2023    Procedure: Atrial Appendage Occlusion;  Surgeon: Anthony Mcdaniel MD;  Location:  [facility];  Service: Cardiovascular;  Laterality: N/A;    CARDIAC CATHETERIZATION Left 09/29/2022    Procedure: Left Heart Cath;  Surgeon: Titus Oliveros IV, MD;  Location:  [facility];  Service: Cardiovascular;  Laterality: Left;    CARDIOVERSION      CATARACT EXTRACTION Bilateral     COLONOSCOPY      CYSTOSCOPY      ENDOSCOPY      possible    ENDOSCOPY N/A 9/5/2024    Procedure: ESOPHAGOGASTRODUODENOSCOPY;  Surgeon: Anthony Arambula MD;  Location:  [facility];  Service: Gastroenterology;  Laterality: N/A;  Esophagus dilated to 18mm with 12mm-mm to 15mm, then 15mm to 18mm balloon.    ENDOSCOPY N/A 10/31/2024    Procedure: ESOPHAGOGASTRODUODENOSCOPY WITH BIOPSY;  Surgeon: Carlos Dior MD;  Location:  [facility];  Service: Gastroenterology;  Laterality: N/A;    ERCP N/A 9/5/2024    Procedure: ENDOSCOPIC RETROGRADE CHOLANGIOPANCREATOGRAPHY;  Surgeon: Anthony Arambula MD;  Location:  [facility];  Service: Gastroenterology;  Laterality: N/A;  Sphincterotomy made to ampula of common bile duct (CBD), CBD swept with 9mm-12mm balloon - sludge, stones and purulent appearing drainage extracted. 10x7 Bulgarian biliary stent depolyed into CBD. ERCP scope removed with balloon intact.    ERCP N/A 10/31/2024    Procedure: ENDOSCOPIC RETROGRADE CHOLANGIOPANCREATOGRAPHY;  Surgeon: Carlos Dior MD;  Location:  [facility];  Service: Gastroenterology;  Laterality: N/A;    KIDNEY STONE SURGERY      x1      General Information       Row Name 12/06/24 0946           Physical Therapy Time and Intention    Document Type evaluation  -ER     Mode of Treatment physical therapy  -ER       Row Name 12/06/24 0918          General Information    Patient Profile Reviewed yes  -ER     Prior Level of Function independent:;all household mobility;gait;transfer;bathing;dressing;min assist:;community mobility;mod assist:;ADL's;home management  living alone, but daughters would come to help as needed. got meals from meals on wheels  -ER     Existing Precautions/Restrictions orthostatic hypotension;fall  -ER     Barriers to Rehab medically complex  -ER       Row Name 12/06/24 0918          Living Environment    People in Home alone  -ER       Row Name 12/06/24 0918          Home Main Entrance    Number of Stairs, Main Entrance one  -ER     Stair Railings, Main Entrance railings safe and in good condition  -ER       Row Name 12/06/24 0918          Stairs Within Home, Primary    Number of Stairs, Within Home, Primary none  -ER       Row Name 12/06/24 0918          Cognition    Orientation Status (Cognition) oriented x 4  -ER       Row Name 12/06/24 0918          Safety Issues/Impairments Affecting Functional Mobility    Safety Issues Affecting Function (Mobility) insight into deficits/self-awareness;safety precaution awareness;awareness of need for assistance  -ER     Impairments Affecting Function (Mobility) balance;endurance/activity tolerance;other (see comments)  orthostatic hypo  -ER               User Key  (r) = Recorded By, (t) = Taken By, (c) = Cosigned By      Initials Name Provider Type    ER Cynthia Allison, PT Physical Therapist                   Mobility       Row Name 12/06/24 0919          Bed Mobility    Bed Mobility rolling left;scooting/bridging;supine-sit  -ER     Rolling Left [place] (Bed Mobility) modified independence  -ER     Scooting/Bridging [place] (Bed Mobility) modified independence  -ER     Supine-Sit [place] (Bed Mobility) modified independence  -ER      Assistive Device (Bed Mobility) bed rails;head of bed elevated  -ER       Row Name 12/06/24 0919          Bed-Chair Transfer    Bed-Chair [place] (Transfers) contact guard;1 person assist  -ER     Assistive Device (Bed-Chair Transfers) walker, front-wheeled  -ER       Row Name 12/06/24 0919          Sit-Stand Transfer    Sit-Stand [place] (Transfers) contact guard  -ER     Assistive Device (Sit-Stand Transfers) walker, front-wheeled  -ER       Row Name 12/06/24 0919          Gait/Stairs (Locomotion)    [place] Level (Gait) contact guard  -ER     Assistive Device (Gait) walker, front-wheeled  -ER     Patient was able to Ambulate yes  -ER     Distance in Feet (Gait) 3  -ER     Deviations/Abnormal Patterns (Gait) bilateral deviations;stride length decreased;gait speed decreased  -ER     Bilateral Gait Deviations forward flexed posture  -ER     Comment, (Gait/Stairs) pt took a few steps to chair. deferred further walking due to blood pressure drop in standing.  -ER               User Key  (r) = Recorded By, (t) = Taken By, (c) = Cosigned By      Initials Name Provider Type    ER Cynthia Allison, PT Physical Therapist                   Obj/Interventions       Row Name 12/06/24 0923          Range of Motion Comprehensive    General Range of Motion no range of motion deficits identified  -ER       Row Name 12/06/24 0923          Strength Comprehensive (MMT)    General Manual Muscle Testing (MMT) Assessment no strength deficits identified  -ER     Comment, General Manual Muscle Testing (MMT) Assessment 5/5 gross MMT BLE  -ER       Row Name 12/06/24 0923          Balance    Balance Assessment sitting static balance;sitting dynamic balance;standing static balance;standing dynamic balance  -ER     Static Sitting Balance modified independence  -ER     Dynamic Sitting Balance standby assist  -ER     Position, Sitting Balance unsupported;sitting edge of bed  -ER     Static Standing Balance contact guard  -ER      Dynamic Standing Balance contact guard  -ER     Position/Device Used, Standing Balance supported;walker, front-wheeled  -ER     Balance Interventions sitting;standing;sit to stand;supported;static;dynamic;minimal challenge;moderate challenge  -ER       Row Name 12/06/24 0923          Sensory Assessment (Somatosensory)    Sensory Assessment (Somatosensory) sensation intact  -ER               User Key  (r) = Recorded By, (t) = Taken By, (c) = Cosigned By      Initials Name Provider Type    ER Cynthia Allison, PT Physical Therapist                   Goals/Plan       Row Name 12/06/24 0929          Bed Mobility Goal 1 (PT)    Activity/Assistive Device (Bed Mobility Goal 1, PT) bridging;rolling to left;rolling to right  -ER     [place] Level/Cues Needed (Bed Mobility Goal 1, PT) independent  -ER     Time Frame (Bed Mobility Goal 1, PT) short term goal (STG);5 days  -ER       Row Name 12/06/24 0929          Transfer Goal 1 (PT)    Activity/Assistive Device (Transfer Goal 1, PT) sit-to-stand/stand-to-sit;bed-to-chair/chair-to-bed;toilet;walker, rolling  -ER     [place] Level/Cues Needed (Transfer Goal 1, PT) modified independence  -ER     Time Frame (Transfer Goal 1, PT) long term goal (LTG);10 days  -ER       Row Name 12/06/24 0929          Gait Training Goal 1 (PT)    Activity/Assistive Device (Gait Training Goal 1, PT) gait (walking locomotion);assistive device use;backward stepping;walker, rolling  -ER     [place] Level (Gait Training Goal 1, PT) modified independence  -ER     Distance (Gait Training Goal 1, PT) 150  -ER     Time Frame (Gait Training Goal 1, PT) long term goal (LTG);10 days  -ER       Row Name 12/06/24 0929          Therapy Assessment/Plan (PT)    Planned Therapy Interventions (PT) balance training;bed mobility training;gait training;home exercise program;strengthening;patient/family education;neuromuscular re-education;transfer training  -ER               User Key  (r) = Recorded By, (t)  = Taken By, (c) = Cosigned By      Initials Name Provider Type    ER Cynthia Allison, PT Physical Therapist                   Clinical Impression       Row Name 12/06/24 0925          Pain    Pretreatment Pain Rating 0/10 - no pain  -ER     Posttreatment Pain Rating 0/10 - no pain  -ER       Row Name 12/06/24 0925          Plan of Care Review    Plan of Care Reviewed With patient  -ER     Outcome Evaluation PT eval complete. Pt demonstrates some signs of orthostatic hypotension in standing. Pt presents below functional baseline for mobility due to BP variations. He will benefit from skilled PT while at [facility]. Recommend home with assist once medically stable to d/c.  -ER       Row Name 12/06/24 0925          Therapy Assessment/Plan (PT)    Patient/Family Therapy Goals Statement (PT) to go home  -ER     Rehab Potential (PT) good  -ER     Criteria for Skilled Interventions Met (PT) yes;meets criteria;skilled treatment is necessary  -ER     Therapy Frequency (PT) daily  -ER     Predicted Duration of Therapy Intervention (PT) 10 days  -ER       Row Name 12/06/24 0925          Vital Signs    Pre Systolic BP Rehab 115  -ER     Pre Treatment Diastolic BP 89  supine  -ER     Intra Systolic BP Rehab 107  -ER     Intra Treatment Diastolic BP 75  sitting EOB  -ER     Post Systolic BP Rehab 85  -ER     Post Treatment Diastolic BP 60  standing  -ER     O2 Delivery Pre Treatment room air  -ER     O2 Delivery Intra Treatment room air  -ER     O2 Delivery Post Treatment room air  -ER     Pre Patient Position Supine  -ER     Intra Patient Position Standing  -ER     Post Patient Position Sitting  -ER       Row Name 12/06/24 0925          Positioning and Restraints    Pre-Treatment Position in bed  -ER     Post Treatment Position chair  -ER     In Chair notified nsg;reclined;call light within reach;encouraged to call for assist;exit alarm on;waffle cushion  -ER               User Key  (r) = Recorded By, (t) = Taken By, (c) = Cosigned  By      Initials Name Provider Type    ER Cynthia Allison, PT Physical Therapist                   Outcome Measures       Row Name 12/06/24 0932          How much help from another person do you currently need...    Turning from your back to your side while in flat bed without using bedrails? 3  -ER     Moving from lying on back to sitting on the side of a flat bed without bedrails? 3  -ER     Moving to and from a bed to a chair (including a wheelchair)? 4  -ER     Standing up from a chair using your arms (e.g., wheelchair, bedside chair)? 4  -ER     Climbing 3-5 steps with a railing? 3  -ER     To walk in hospital room? 3  -ER     AM-PAC 6 Clicks Score (PT) 20  -ER     Highest Level of Mobility Goal 6 --> Walk 10 steps or more  -ER       Row Name 12/06/24 0932          Functional Assessment    Outcome Measure Options AM-PAC 6 Clicks Basic Mobility (PT)  -ER               User Key  (r) = Recorded By, (t) = Taken By, (c) = Cosigned By      Initials Name Provider Type    ER Cynthia Allison, PT Physical Therapist                                 Physical Therapy Education       Title: PT OT SLP Therapies (In Progress)       Topic: Physical Therapy (Done)       Point: Mobility training (Done)       Learning Progress Summary            Patient Acceptance, E, VU by ER at 12/6/2024 0934    Comment: making sure he gets enough fluids in/absorption to help orthostatics, transfer safety, d/c planning                      Point: Home exercise program (Done)       Learning Progress Summary            Patient Acceptance, E, VU by ER at 12/6/2024 0934    Comment: making sure he gets enough fluids in/absorption to help orthostatics, transfer safety, d/c planning                      Point: Body mechanics (Done)       Learning Progress Summary            Patient Acceptance, E, VU by ER at 12/6/2024 0934    Comment: making sure he gets enough fluids in/absorption to help orthostatics, transfer safety, d/c planning                       Point: Precautions (Done)       Learning Progress Summary            Patient Acceptance, E, VU by ER at 12/6/2024 0934    Comment: making sure he gets enough fluids in/absorption to help orthostatics, transfer safety, d/c planning                                      User Key       Initials Effective Dates Name Provider Type Discipline    ER 11/04/24 -  Cynthia Allison, PT Physical Therapist PT                  PT Recommendation and Plan  Planned Therapy Interventions (PT): balance training, bed mobility training, gait training, home exercise program, strengthening, patient/family education, neuromuscular re-education, transfer training  Outcome Evaluation: PT eval complete. Pt demonstrates some signs of orthostatic hypotension in standing. Pt presents below functional baseline for mobility due to BP variations. He will benefit from skilled PT while at [facility]. Recommend home with assist once medically stable to d/c.     Time Calculation:   PT Evaluation Complexity  History, PT Evaluation Complexity: 1-2 personal factors and/or comorbidities  Examination of Body Systems (PT Eval Complexity): total of 3 or more elements  Clinical Presentation (PT Evaluation Complexity): stable  Clinical Decision Making (PT Evaluation Complexity): low complexity  Overall Complexity (PT Evaluation Complexity): low complexity     PT Charges       Row Name 12/06/24 0936             Time Calculation    Start Time 0844  -ER      PT Received On 12/06/24  -ER      PT Goal Re-Cert Due Date 12/16/24  -ER         Untimed Charges    PT Eval/Re-eval Minutes 52  -ER         Total Minutes    Untimed Charges Total Minutes 52  -ER       Total Minutes 52  -ER                User Key  (r) = Recorded By, (t) = Taken By, (c) = Cosigned By      Initials Name Provider Type    ER Cynthia Allison, PT Physical Therapist                  Therapy Charges for Today       Code Description Service Date Service Provider Modifiers Qty    [id number] HC PT EVAL LOW  COMPLEXITY 4 12/6/2024 Cynthia Allison, PT GP 1            PT G-Codes  Outcome Measure Options: AM-PAC 6 Clicks Basic Mobility (PT)  AM-PAC 6 Clicks Score (PT): 20  PT Discharge Summary  Anticipated Discharge Disposition (PT): home with assist    Cynthia Allison, PT  12/6/2024

## 2024-12-06 NOTE — CONSULTS
[facility]   HISTORY AND PHYSICAL    Patient Name: Darien Camarena  : 1935  MRN: [id number]  Primary Care Physician:  Pito Way MD  Date of admission: 2024    Subjective   Subjective     Chief Complaint: Lightheadedness, generalized weakness, hypotension    HPI:    Darien Camarena is a 89 y.o. male who has a complex past medical history and notable for chronic urinary retention which is managed with intermittent catheterization.  He saw the nurse practitioner in the urology office today who performed intermittent catheterization and this proved to be fairly easy.  He was complaining of some generalized weakness and was found to be hypotensive and sent to the ER for further evaluation.      He has reported some difficulty performing self-catheterization at home.  I have seen this patient remotely and he has severe left-sided vesicoureteral reflux with chronic appearing hydroureteronephrosis and a very atrophic appearing left-sided kidney.  His renal function is elevated to 1.19 creatinine from baseline around 0.7/0.8.  He denies any significant flank pain.  Urinary tract infection is likely given UA finding showing nitrite positive urine with moderate leukocytes and bacteria.    His daughter is present in the room.  The patient does not endorse any significant symptoms at this time but appears to be a relatively poor historian.    He has a history of recurrent urinary tract infection and appears to be following with infectious disease.  He has been managed with methenamine hippurate for UTI prophylaxis as well.    He has a mild lactic acidosis.  He has no significant leukocytosis but he does have a neutrophilic left shift.    Review of Systems   All systems were reviewed and negative except for: None    Personal History     Past Medical History:   Diagnosis Date    Arrhythmia     Atrial fibrillation     Chronic kidney disease     COPD (chronic obstructive pulmonary disease)      Coronary artery disease     Dementia     Diabetes mellitus     doesnt check sugar    E. coli sepsis 06/23/2022    Enlarged prostate without lower urinary tract symptoms (luts) 06/20/2016    Full dentures     GERD (gastroesophageal reflux disease)     Gout     Hearing aid worn     bilat prn    History of colonoscopy 09/12/2012    History of radiation therapy 02/24/2023    SBRT LLL lung    [place] (hard of hearing)     hearing aids prn    Hyperlipidemia     Hypertension     Kidney stone     surgery x1    Lung cancer     STEVEN on CPAP     compliant with machine    PAF (paroxysmal atrial fibrillation)     Prostatism     Urinary incontinence     Urinary tract infection     Wears glasses     readers       Past Surgical History:   Procedure Laterality Date    ATRIAL APPENDAGE EXCLUSION LEFT WITH TRANSESOPHAGEAL ECHOCARDIOGRAM N/A 11/28/2023    Procedure: Atrial Appendage Occlusion;  Surgeon: Anthony Mcdaniel MD;  Location:  [facility];  Service: Cardiovascular;  Laterality: N/A;    CARDIAC CATHETERIZATION Left 09/29/2022    Procedure: Left Heart Cath;  Surgeon: Titus Oliveros IV, MD;  Location:  [facility];  Service: Cardiovascular;  Laterality: Left;    CARDIOVERSION      CATARACT EXTRACTION Bilateral     COLONOSCOPY      CYSTOSCOPY      ENDOSCOPY      possible    ENDOSCOPY N/A 9/5/2024    Procedure: ESOPHAGOGASTRODUODENOSCOPY;  Surgeon: Anthony Arambula MD;  Location:  [facility];  Service: Gastroenterology;  Laterality: N/A;  Esophagus dilated to 18mm with 12mm-mm to 15mm, then 15mm to 18mm balloon.    ENDOSCOPY N/A 10/31/2024    Procedure: ESOPHAGOGASTRODUODENOSCOPY WITH BIOPSY;  Surgeon: Carlos Dior MD;  Location:  [facility];  Service: Gastroenterology;  Laterality: N/A;    ERCP N/A 9/5/2024    Procedure: ENDOSCOPIC RETROGRADE CHOLANGIOPANCREATOGRAPHY;  Surgeon: Anthony Arambula MD;  Location:  [facility];  Service: Gastroenterology;  Laterality: N/A;   Sphincterotomy made to ampula of common bile duct (CBD), CBD swept with 9mm-12mm balloon - sludge, stones and purulent appearing drainage extracted. 10x7 Indonesian biliary stent depolyed into CBD. ERCP scope removed with balloon intact.    ERCP N/A 10/31/2024    Procedure: ENDOSCOPIC RETROGRADE CHOLANGIOPANCREATOGRAPHY;  Surgeon: Carlos Dior MD;  Location: [facility];  Service: Gastroenterology;  Laterality: N/A;    KIDNEY STONE SURGERY      x1       Family History: family history includes Alzheimer's disease in his mother; COPD in his father; Cancer in his father; Kidney disease in his father; Lung cancer in his father; No Known Problems in his daughter, daughter, and daughter. Otherwise pertinent FHx was reviewed and not pertinent to current issue.    Social History:  reports that he quit smoking about 64 years ago. His smoking use included cigarettes. He started smoking about 77 years ago. He has a 15 pack-year smoking history. He has been exposed to tobacco smoke. He quit smokeless tobacco use about 13 years ago.  His smokeless tobacco use included chew. He reports that he does not drink alcohol and does not use drugs.    Home Medications:  Coenzyme Q10, Iron, Probiotic Product, albuterol sulfate HFA, allopurinol, ascorbic acid, docusate sodium, donepezil, finasteride, furosemide, memantine, metFORMIN, methenamine, metoprolol tartrate, multivitamin with minerals, omeprazole, potassium chloride, pravastatin, sertraline, and tiotropium bromide-olodaterol      Allergies:  Allergies   Allergen Reactions    Sglt2 Inhibitors Other (See Comments)     Recurrent UTI    Xarelto [Rivaroxaban] GI Bleeding     GI bleed    Penicillins Hives     Has tolerated cefepime, ceftriaxone, cefazolin, cefdinir, cefuroxime, cephalexin       Objective   Objective     Vitals:   Temp:  [97.7 °F (36.5 °C)-98 °F (36.7 °C)] 98 °F (36.7 °C)  Heart Rate:  [] 107  Resp:  [16-18] 18  BP: ()/(44-95)  117/79  Physical Exam    Constitutional: Awake in bed, alert    Eyes: PERRLA, sclerae anicteric, no conjunctival injection   HENT: Normocephalic, atraumatic, mucous membranes moist   Neck: Supple, trachea midline   Respiratory:Equal chest rise, non-labored respirations    Cardiovascular: RRR, palpable radial pulses bilaterally   Gastrointestinal: Soft, nontender, non-distended, no flank pain to palpation    Musculoskeletal: No bilateral ankle edema, no clubbing or cyanosis to extremities   Psychiatric: Appropriate affect, cooperative   Neurologic: Oriented x 3, Cranial Nerves grossly intact, speech clear   Skin: No rashes     Result Review    Result Review:  I have personally reviewed the results from the time of this admission to 12/5/2024 19:10 EST and agree with these findings:  [x]  Laboratory  [x]  Microbiology  [x]  Radiology  []  EKG/Telemetry   []  Cardiology/Vascular   []  Pathology  [x]  Old records  []  Other:    Most notable findings include: Creatinine 1.19    Assessment & Plan   Assessment / Plan     Brief Patient Summary:  Darien Camarena is a 89 y.o. male who has a history of chronic left-sided vesicoureteral reflux with severe hydroureteronephrosis and chronic left renal atrophy.  He has chronic urinary retention managed with intermittent catheterization.  Recently reported trouble performing CIC at home, nurse practitioner performed easy catheterization in the office.  He was reporting weakness fatigue and hypotension was noted and he was sent to the ER.  He looks like he has a UTI.  I recommend indwelling Del Cid catheter and repeat a CT without contrast tomorrow to reassess his severe left-sided chronic appearing hydroureteronephrosis related to vesicoureteral reflux.  He may require left-sided nephrostomy tube for complete drainage of no improvement with infection or persistent hydronephrosis is noted.  Discussed with the daughter at the bedside this could be a source of infection or recurrent  infection and attempt at retrograde placement of ureteral stent would be extraordinarily challenging given the multiple redundant ureteral loops noted on his CT imaging from September.    Active Hospital Problems:  Active Hospital Problems    Diagnosis     **Orthostatic hypotension     A-fib     Acute on chronic urinary retention     Heart failure with mildly reduced ejection fraction (HFmrEF)     Stage 3 chronic kidney disease     Essential hypertension      Plan:   -Antibiotics for UTI, follow-up cultures  -CT abdomen pelvis without contrast ordered for tomorrow  -I talked with nursing who will place an indwelling Del Cid catheter for maximal drainage we will see if this causes resolution of his chronic appearing left hydroureteronephrosis  -If persistent obstruction noted at the left renal unit this could be a source of recurrent infection I would recommend a left nephrostomy tube during this admission    VTE Prophylaxis:  Mechanical VTE prophylaxis orders are present.        CODE STATUS:       Admission Status:  I believe this patient meets inpatient status.    Electronically signed by Krystian Larkin MD, 12/05/24, 7:10 PM EST.

## 2024-12-06 NOTE — PAYOR COMM NOTE
"Darien Camarena \"[place]\" (89 y.o. Male)    [id number]    Lindsey Stout RN  Utilization Review  [phone]  [phone]          Date of Birth   1935    Social Security Number       Address   [address]  [facility] [address]    Home Phone   [phone]    MRN   [id number]       Mormonism   Holiness    Marital Status                               Admission Date   12/5/24    Admission Type   Emergency    Admitting Provider   Clarence Samaniego MD    Attending Provider   Clarence Samaniego MD    Department, Room/Bed   [facility] 6B, N631/1       Discharge Date       Discharge Disposition       Discharge Destination                                 Attending Provider: Clarence Samaniego MD    Allergies: Sglt2 Inhibitors, Xarelto [Rivaroxaban], Penicillins    Isolation: Contact   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), 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)   Code Status: Prior    Ht: 190.5 cm (75\")   Wt: 94.8 kg (209 lb)    Admission Cmt: None   Principal Problem: Orthostatic hypotension [I95.1]                   Active Insurance as of 12/5/2024       Primary Coverage       Payor Plan Insurance Group Employer/Plan Group    ANTHEM MEDICARE REPLACEMENT ANTHEM MED ADV HMO [id number]       Payor Plan Address Payor Plan Phone Number Payor Plan Fax Number Effective Dates    [address] [phone]  1/1/2024 - None Entered    [facility] [address]         Subscriber Name Subscriber Birth Date Member ID       DARIEN CAMARENA 1935 [id number]                     Emergency Contacts        (Rel.) Home Phone Work Phone Mobile Phone    Nikki Ware (Daughter) [phone] -- [phone]    JackelynColumba (Daughter) [phone] -- [phone]    Migue Dolly Bray (Daughter) [phone] -- [phone]                 History & Physical        Junaid Acosta MD at 12/05/24 [facility]              [facility] Medicine Services  HISTORY AND PHYSICAL    Patient " Name: Darien Camarena  : 1935  MRN: [id number]  Primary Care Physician: Pito Way MD  Date of admission: 2024      Subjective  Subjective     Chief Complaint: Dizziness, urinary retention    HPI:  Darien Camarena is a 89 y.o. male with history of chronic atrial fibrillation s/p watchman, CKD stage III, chronic urinary retention with self caths, HFmr EF, type 2 diabetes, hypertension hyperlipidemia.  Patient presents from urology clinic with complaints of dizziness, trouble self cathing for the last couple of days.  While in the office, patient was found to be hypotensive with SBP in the 90s and symptomatic and as such was sent to the ED.  On arrival here his BP was 67/44, he was tachycardic with heart rate of 121.  Initial lab work revealed lactate 3.0, otherwise f/u unremarkable.  UA was obtained, blood cultures sent, he was started on broad-spectrum antibiotics, received a dose of diltiazem and hospital medicine asked to admit.  At the time of my evaluation, patient was able to self cath, he continued to endorse dizziness mostly upon standing, denies any falls he denies any fevers or chills, no nausea no vomiting, no SOA      Personal History     Past Medical History:   Diagnosis Date    Arrhythmia     Atrial fibrillation     Chronic kidney disease     COPD (chronic obstructive pulmonary disease)     Coronary artery disease     Diabetes mellitus     doesnt check sugar    E. coli sepsis 2022    Enlarged prostate without lower urinary tract symptoms (luts) 2016    Full dentures     GERD (gastroesophageal reflux disease)     Gout     Hearing aid worn     bilat prn    History of colonoscopy 2012    History of radiation therapy 2023    SBRT LLL lung    [place] (hard of hearing)     hearing aids prn    Hyperlipidemia     Hypertension     Kidney stone     surgery x1    Lung cancer     STEVEN on CPAP     compliant with machine    PAF (paroxysmal atrial fibrillation)      Prostatism     Urinary incontinence     Urinary tract infection     Wears glasses     readers         Oncology Problem List:  Malignant neoplasm of lower lobe of left lung (01/30/2023; Status:   Active)    Oncology/Hematology History   Malignant neoplasm of lower lobe of left lung   1/30/2023 Initial Diagnosis    Malignant neoplasm of lower lobe of left lung (HCC)     2/14/2023 - 2/24/2023 Radiation    Radiation OncologyTreatment Course:  Darien Camarena received 5000 cGy in 5 fractions to left lung via External Beam Radiation - EBRT.       Past Surgical History:   Procedure Laterality Date    ATRIAL APPENDAGE EXCLUSION LEFT WITH TRANSESOPHAGEAL ECHOCARDIOGRAM N/A 11/28/2023    Procedure: Atrial Appendage Occlusion;  Surgeon: Anthony Mcdaniel MD;  Location:  [facility] EP INVASIVE LOCATION;  Service: Cardiovascular;  Laterality: N/A;    CARDIAC CATHETERIZATION Left 09/29/2022    Procedure: Left Heart Cath;  Surgeon: Titus Oliveros IV, MD;  Location:  [facility] CATH INVASIVE LOCATION;  Service: Cardiovascular;  Laterality: Left;    CARDIOVERSION      CATARACT EXTRACTION Bilateral     COLONOSCOPY      CYSTOSCOPY      ENDOSCOPY      possible    ENDOSCOPY N/A 9/5/2024    Procedure: ESOPHAGOGASTRODUODENOSCOPY;  Surgeon: Anthony Arambula MD;  Location:  [facility] ENDOSCOPY;  Service: Gastroenterology;  Laterality: N/A;  Esophagus dilated to 18mm with 12mm-mm to 15mm, then 15mm to 18mm balloon.    ENDOSCOPY N/A 10/31/2024    Procedure: ESOPHAGOGASTRODUODENOSCOPY WITH BIOPSY;  Surgeon: Carlos Dior MD;  Location:  [facility] ENDOSCOPY;  Service: Gastroenterology;  Laterality: N/A;    ERCP N/A 9/5/2024    Procedure: ENDOSCOPIC RETROGRADE CHOLANGIOPANCREATOGRAPHY;  Surgeon: Anthony Arambula MD;  Location:  [facility] ENDOSCOPY;  Service: Gastroenterology;  Laterality: N/A;  Sphincterotomy made to ampula of common bile duct (CBD), CBD swept with 9mm-12mm balloon - sludge, stones and purulent appearing drainage extracted. 10x7  Czech biliary stent depolyed into CBD. ERCP scope removed with balloon intact.    ERCP N/A 10/31/2024    Procedure: ENDOSCOPIC RETROGRADE CHOLANGIOPANCREATOGRAPHY;  Surgeon: Carlos Dior MD;  Location: [facility] ENDOSCOPY;  Service: Gastroenterology;  Laterality: N/A;    KIDNEY STONE SURGERY      x1       Family History: family history includes Alzheimer's disease in his mother; COPD in his father; Cancer in his father; Kidney disease in his father; Lung cancer in his father; No Known Problems in his daughter, daughter, and daughter.     Social History:  reports that he quit smoking about 64 years ago. His smoking use included cigarettes. He started smoking about 77 years ago. He has a 15 pack-year smoking history. He has been exposed to tobacco smoke. He quit smokeless tobacco use about 13 years ago.  His smokeless tobacco use included chew. He reports that he does not drink alcohol and does not use drugs.  Social History     Social History Narrative    Caffeine: 1 cup of decaff coffee daily        Medications:  Available home medication information reviewed.  Coenzyme Q10, Iron, Probiotic Product, albuterol sulfate HFA, allopurinol, ascorbic acid, docusate sodium, donepezil, finasteride, furosemide, memantine, metFORMIN, methenamine, metoprolol tartrate, multivitamin with minerals, omeprazole, potassium chloride, pravastatin, sertraline, and tiotropium bromide-olodaterol    Allergies   Allergen Reactions    Sglt2 Inhibitors Other (See Comments)     Recurrent UTI    Xarelto [Rivaroxaban] GI Bleeding     GI bleed    Penicillins Hives     Has tolerated cefepime, ceftriaxone, cefazolin, cefdinir, cefuroxime, cephalexin       Objective  Objective     Vital Signs:   Temp:  [97.7 °F (36.5 °C)] 97.7 °F (36.5 °C)  Heart Rate:  [] 112  Resp:  [16] 16  BP: ()/(44-95) 117/87       Physical Exam   Constitutional: Elderly male, in no awake, alert  Eyes: PERRLA, sclerae anicteric, no conjunctival  injection  HENT: NCAT, mucous membranes moist  Neck: Supple, no thyromegaly, no lymphadenopathy, trachea midline  Respiratory: Clear to auscultation bilaterally, nonlabored respirations   Cardiovascular: Irregular irregular no murmurs, rubs, or gallops, palpable pedal pulses bilaterally  Gastrointestinal: Positive bowel sounds, soft, nontender, nondistended  Musculoskeletal: No bilateral ankle edema, no clubbing or cyanosis to extremities  Psychiatric: Appropriate affect, cooperative  Neurologic: Oriented x 3, strength symmetric in all extremities, Cranial Nerves grossly intact to confrontation, speech clear  Skin: No rashes     Result Review:  I have personally reviewed the results from the time of this admission to 12/5/2024 15:04 EST and agree with these findings:  [x]  Laboratory list / accordion  []  Microbiology  []  Radiology  []  EKG/Telemetry   []  Cardiology/Vascular   []  Pathology  []  Old records  []  Other:  Most notable findings include:     LAB RESULTS:      Lab 12/05/24  1350 12/05/24  1034   WBC  --  9.69   HEMOGLOBIN  --  13.6   HEMATOCRIT  --  43.7   PLATELETS  --  252   NEUTROS ABS  --  7.39*   IMMATURE GRANS (ABS)  --  0.03   LYMPHS ABS  --  1.47   MONOS ABS  --  0.48   EOS ABS  --  0.25   MCV  --  100.0*   PROCALCITONIN  --  0.05   LACTATE 2.9* 3.0*         Lab 12/05/24  1034   SODIUM 142   POTASSIUM 5.0   CHLORIDE 100   CO2 29.0   ANION GAP 13.0   BUN 40*   CREATININE 1.19   EGFR 58.8*   GLUCOSE 106*   CALCIUM 9.7   MAGNESIUM 1.7         Lab 12/05/24  1034   TOTAL PROTEIN 7.7   ALBUMIN 3.5   GLOBULIN 4.2   ALT (SGPT) 18   AST (SGOT) 41*   BILIRUBIN 0.9   ALK PHOS 221*         Lab 12/05/24  1034   HSTROP T 36*                 UA          5/7/2024    15:07 5/7/2024    15:16 9/4/2024    21:48 12/5/2024    10:52 12/5/2024    14:32   Urinalysis   Squamous Epithelial Cells, UA 3-6   3-6   None Seen    Specific [place], UA   1.015   1.010    Ketones, UA  Negative  Negative  Negative  Negative     Blood, UA   Negative   Negative    Leukocytes, UA  500 Germaine/ul  Small (1+)  Large (3+)  Moderate (2+)    Nitrite, UA   Negative   Positive    RBC, UA 0-2   0-2   0-2    WBC, UA 6-10   11-20   21-50    Bacteria, UA Trace   3+   4+        Microbiology Results (last 10 days)       ** No results found for the last 240 hours. **            XR Chest 1 View    Result Date: 12/5/2024  XR CHEST 1 VW Date of Exam: 12/5/2024 11:07 AM EST Indication: Weak/Dizzy/AMS triage protocol Comparison: 11/6/2024 Findings: Left lower lobe infiltrate has improved with minimal infiltrate remaining. Heart size is normal. Right lung is clear. There are no effusions.     Impression: Impression: Improved left lower lobe infiltrate. No new abnormality. Electronically Signed: Aster Mathur MD  12/5/2024 11:36 AM EST  Workstation ID: [id number]     Results for orders placed during the hospital encounter of 11/21/24    Adult Transesophageal Echo 3D (FARHAD) W/ Cont If Necessary Per Protocol    Interpretation Summary    There is a 27mm Watchman FLX device in place. It appears well seated and well compressed with no device related thrombus seen. There is a small jet of color flow, 2.5 mm, at the superior aspect adjacent to the left upper pulmonary vein. This was not seen previous examination however image quality is improved compared to prior.    Left ventricular systolic function is moderately decreased. Left ventricular ejection fraction appears to be 36 - 40%. Normal left ventricular wall thickness noted. The left ventricular cavity is dilated. There is left ventricular global hypokinesis noted.    : The right ventricular cavity is mildly dilated. Mildly reduced right ventricular systolic function noted.    : The left atrial cavity is severely dilated. No evidence of a left atrial thrombus present. There is light spontaneous echo contrast present in the atrial body.    The right atrial cavity is severely dilated.    There is mild, bileaflet mitral  valve thickening present. Mild mitral valve regurgitation is present. No significant mitral valve stenosis is present.    Mild tricuspid valve regurgitation is present. Estimated right ventricular systolic pressure from tricuspid regurgitation is mildly elevated (35-45 mmHg).    There is moderate pulmonic valve regurgitation present.      Assessment & Plan  Assessment & Plan       Acute on chronic urinary retention    Essential hypertension    Stage 3 chronic kidney disease    Orthostatic hypotension    Heart failure with mildly reduced ejection fraction (HFmrEF)    [place]    Darien Camarena is a 89 y.o. male with history of chronic atrial fibrillation s/p watchman, CKD stage III, chronic urinary retention with self caths, HFmr EF, type 2 diabetes, hypertension hyperlipidemia.  Patient sent from urology clinic with complaints of dizziness and hypotension    Orthostatic hypotension  Hypotension  -Patient complaining of dizziness while standing up  -BP is low on arrival here  -Hold antihypertensives and diuretics  -Resolved start IV fluids and repeat orthostatic vital signs  -May need med adjustments at discharge     Chronic atrial fibrillation  -S/p watchman, heart rate elevated in the ED  -He s/p Cardizem 10 mg x 1, he is on metoprolol 100 mg twice daily, will continue with holding parameters  -Cardiology consulted, sees Dr. Oliveros    CKD stage III  -Renal function seems to be stable and within his baseline    HFmrEF  -Patient seems to be currently compensated, last echo on file 11/21 with EF 36-to 40%  -Recently had a recent increase in Lasix due to lower extremity edema  -Hold diuretics at this time due to hypotension    Chronic urinary retention  Suspected UTI  -Patient self caths 4 times daily, has been having difficulty recently though he was able to self cath in the ED today  -Seen in urology clinic this morning, they are consulted  -Follow-up UA, positive leuks follow-up blood and urine cultures, did  receive a dose of vancomycin and cefepime in the ED, will hold off on further antibiotics at this time  -Continue Proscar    Well-controlled type 2 diabetes with A1c 5.9%  -Continue SSI for now, hold metformin    Dementia and depression  -Daughter stated that this has been progressive  -Continue Aricept and Namenda  -Continue Zoloft    VTE Prophylaxis:  Mechanical VTE prophylaxis orders are signed & held.      CODE STATUS:    There are no questions and answers to display.     Expected Discharge   Expected Discharge Date: 12/7/2024; Expected Discharge Time:      Junaid Acosta MD  12/05/24     Electronically signed by Junaid Acosta MD at 12/05/24 1504          Emergency Department Notes        Kimberly Vargas, RN at 12/05/24 1600          / Darien Camarena    Nursing Report ED to Floor:  Mental status: alert and oriented x 4  Ambulatory status: up with assist  Oxygen Therapy:  ra  Cardiac Rhythm: a-fib  Admitted from: home  Safety Concerns:  fall risk  Social Issues: pt lives alone  ED Room #:  30    ED Nurse Phone Extension - [phone] or may call [phone].      HPI:   Chief Complaint   Patient presents with    Weakness - Generalized    Dizziness       Past Medical History:  Past Medical History:   Diagnosis Date    Arrhythmia     Atrial fibrillation     Chronic kidney disease     COPD (chronic obstructive pulmonary disease)     Coronary artery disease     Diabetes mellitus     doesnt check sugar    E. coli sepsis 06/23/2022    Enlarged prostate without lower urinary tract symptoms (luts) 06/20/2016    Full dentures     GERD (gastroesophageal reflux disease)     Gout     Hearing aid worn     bilat prn    History of colonoscopy 09/12/2012    History of radiation therapy 02/24/2023    SBRT LLL lung    [place] (hard of hearing)     hearing aids prn    Hyperlipidemia     Hypertension     Kidney stone     surgery x1    Lung cancer     STEVEN on CPAP     compliant with machine    PAF (paroxysmal atrial fibrillation)     Prostatism      Urinary incontinence     Urinary tract infection     Wears glasses     readers        Past Surgical History:  Past Surgical History:   Procedure Laterality Date    ATRIAL APPENDAGE EXCLUSION LEFT WITH TRANSESOPHAGEAL ECHOCARDIOGRAM N/A 11/28/2023    Procedure: Atrial Appendage Occlusion;  Surgeon: Anthony Mcdaniel MD;  Location:  [facility] EP INVASIVE LOCATION;  Service: Cardiovascular;  Laterality: N/A;    CARDIAC CATHETERIZATION Left 09/29/2022    Procedure: Left Heart Cath;  Surgeon: Titus Oliveros IV, MD;  Location:  [facility] CATH INVASIVE LOCATION;  Service: Cardiovascular;  Laterality: Left;    CARDIOVERSION      CATARACT EXTRACTION Bilateral     COLONOSCOPY      CYSTOSCOPY      ENDOSCOPY      possible    ENDOSCOPY N/A 9/5/2024    Procedure: ESOPHAGOGASTRODUODENOSCOPY;  Surgeon: Anthony Arambula MD;  Location:  [facility] ENDOSCOPY;  Service: Gastroenterology;  Laterality: N/A;  Esophagus dilated to 18mm with 12mm-mm to 15mm, then 15mm to 18mm balloon.    ENDOSCOPY N/A 10/31/2024    Procedure: ESOPHAGOGASTRODUODENOSCOPY WITH BIOPSY;  Surgeon: Carlos Dior MD;  Location:  [facility] ENDOSCOPY;  Service: Gastroenterology;  Laterality: N/A;    ERCP N/A 9/5/2024    Procedure: ENDOSCOPIC RETROGRADE CHOLANGIOPANCREATOGRAPHY;  Surgeon: Anthony Arambula MD;  Location:  [facility] ENDOSCOPY;  Service: Gastroenterology;  Laterality: N/A;  Sphincterotomy made to ampula of common bile duct (CBD), CBD swept with 9mm-12mm balloon - sludge, stones and purulent appearing drainage extracted. 10x7 Croatian biliary stent depolyed into CBD. ERCP scope removed with balloon intact.    ERCP N/A 10/31/2024    Procedure: ENDOSCOPIC RETROGRADE CHOLANGIOPANCREATOGRAPHY;  Surgeon: Carlos Dior MD;  Location:  [facility] ENDOSCOPY;  Service: Gastroenterology;  Laterality: N/A;    KIDNEY STONE SURGERY      x1        Admitting Doctor:   Junaid Acosta MD    Consulting Provider(s):  Consults       No orders found from 11/6/2024 to  12/6/2024.             Admitting Diagnosis:   The primary encounter diagnosis was Orthostasis. Diagnoses of Light headedness, Chronic atrial fibrillation with rapid ventricular response, and Acute on chronic urinary retention were also pertinent to this visit.    Most Recent Vitals:   Vitals:    12/05/24 1500 12/05/24 1515 12/05/24 1530 12/05/24 1545   BP: 114/84 99/73 120/87 110/87   BP Location:       Patient Position:       Pulse: 112 106 105 108   Resp:       Temp:       TempSrc:       SpO2: 96% 93% 96% 92%   Weight:       Height:           Active LDAs/IV Access:   Lines, Drains & Airways       Active LDAs       Name Placement date Placement time Site Days    Peripheral IV 12/05/24 1040 Left Antecubital 12/05/24  1040  Antecubital  less than 1                    Labs (abnormal labs have a star):   Labs Reviewed   COMPREHENSIVE METABOLIC PANEL - Abnormal; Notable for the following components:       Result Value    Glucose 106 (*)     BUN 40 (*)     AST (SGOT) 41 (*)     Alkaline Phosphatase 221 (*)     BUN/Creatinine Ratio 33.6 (*)     eGFR 58.8 (*)     All other components within normal limits    Narrative:     GFR Normal >60  Chronic Kidney Disease <60  Kidney Failure <15    The GFR formula is only valid for adults with stable renal function between ages 18 and 70.   SINGLE HS TROPONIN T - Abnormal; Notable for the following components:    HS Troponin T 36 (*)     All other components within normal limits    Narrative:     High Sensitive Troponin T Reference Range:  <14.0 ng/L- Negative Female for AMI  <22.0 ng/L- Negative Male for AMI  >=14 - Abnormal Female indicating possible myocardial injury.  >=22 - Abnormal Male indicating possible myocardial injury.   Clinicians would have to utilize clinical acumen, EKG, Troponin, and serial changes to determine if it is an Acute Myocardial Infarction or myocardial injury due to an underlying chronic condition.        CBC WITH AUTO DIFFERENTIAL - Abnormal; Notable for  "the following components:    .0 (*)     MCHC 31.1 (*)     RDW-SD 55.0 (*)     Neutrophil % 76.2 (*)     Lymphocyte % 15.2 (*)     Neutrophils, Absolute 7.39 (*)     All other components within normal limits   LACTIC ACID, PLASMA - Abnormal; Notable for the following components:    Lactate 3.0 (*)     All other components within normal limits   LACTIC ACID, REFLEX - Abnormal; Notable for the following components:    Lactate 2.9 (*)     All other components within normal limits   URINALYSIS W/ CULTURE IF INDICATED - Abnormal; Notable for the following components:    Leuk Esterase, UA Moderate (2+) (*)     Nitrite, UA Positive (*)     All other components within normal limits    Narrative:     In absence of clinical symptoms, the presence of pyuria, bacteria, and/or nitrites on the urinalysis result does not correlate with infection.   URINALYSIS, MICROSCOPIC ONLY - Abnormal; Notable for the following components:    WBC, UA 21-50 (*)     Bacteria, UA 4+ (*)     All other components within normal limits   MAGNESIUM - Normal   PROCALCITONIN - Normal    Narrative:     As a Marker for Sepsis (Non-Neonates):    1. <0.5 ng/mL represents a low risk of severe sepsis and/or septic shock.  2. >2 ng/mL represents a high risk of severe sepsis and/or septic shock.    As a Marker for Lower Respiratory Tract Infections that require antibiotic therapy:    PCT on Admission    Antibiotic Therapy       6-12 Hrs later    >0.5                Strongly Recommended  >0.25 - <0.5        Recommended   0.1 - 0.25          Discouraged              Remeasure/reassess PCT  <0.1                Strongly Discouraged     Remeasure/reassess PCT    As 28 day mortality risk marker: \"Change in Procalcitonin Result\" (>80% or <=80%) if Day 0 (or Day 1) and Day 4 values are available. Refer to http://www.[employer]-pct-calculator.com    Change in PCT <=80%  A decrease of PCT levels below or equal to 80% defines a positive change in PCT test result " representing a higher risk for 28-day all-cause mortality of patients diagnosed with severe sepsis for septic shock.    Change in PCT >80%  A decrease of PCT levels of more than 80% defines a negative change in PCT result representing a lower risk for 28-day all-cause mortality of patients diagnosed with severe sepsis or septic shock.      BLOOD CULTURE   BLOOD CULTURE   URINE CULTURE   RAINBOW DRAW    Narrative:     The following orders were created for panel order Emily Draw.  Procedure                               Abnormality         Status                     ---------                               -----------         ------                     Green Top (Gel)[[id number]]                                  Final result               Lavender Top[[id number]]                                     Final result               Gold Top - SST[[id number]]                                   Final result               Gray Top[[id number]]                                         Final result               Light Blue Top[[id number]]                                   Final result                 Please view results for these tests on the individual orders.   CBC AND DIFFERENTIAL    Narrative:     The following orders were created for panel order CBC & Differential.  Procedure                               Abnormality         Status                     ---------                               -----------         ------                     CBC Auto Differential[[id number]]        Abnormal            Final result                 Please view results for these tests on the individual orders.   GREEN TOP   LAVENDER TOP   GOLD TOP - SST   GRAY TOP   LIGHT BLUE TOP       Meds Given in ED:   Medications   sodium chloride 0.9 % flush 10 mL (has no administration in time range)   vancomycin 2250 mg/500 mL 0.9% NS IVPB ([facility]) (2,250 mg Intravenous New Bag 12/5/24 1977)   cefepime 1000 mg IVPB in 100 mL NS (MBP) (has no administration in time  range)   sodium chloride 0.9 % bolus 1,000 mL (0 mL Intravenous Stopped 12/5/24 1203)   dilTIAZem (CARDIZEM) injection 10 mg (10 mg Intravenous Given 12/5/24 1259)           Last NIH score:                                                          Dysphagia screening results:  Patient Factors Component (Dysphagia:Stroke or Rule-out)  Best Eye Response: 4-->(E4) spontaneous (12/05/24 1049)  Best Motor Response: 6-->(M6) obeys commands (12/05/24 1049)  Best Verbal Response: 5-->(V5) oriented (12/05/24 1049)  [place] Coma Scale Score: 15 (12/05/24 1049)     [place] Coma Scale:  No data recorded     CIWA:        Restraint Type:            Isolation Status:  Contact          Electronically signed by Kimberly Vargas RN at 12/05/24 1602       Delfino Forbes PA at 12/05/24 1138       Attestation signed by Alexx Aarujo MD at 12/05/24 1500        SHARED APC NON FACE TO FACE: I performed a substantive part of the MDM during the patient's E/M visit. I personally made or approved the documented management plan and acknowledge its risk of complications.   Alexx Araujo MD 12/5/2024 14:59 EST                         Subjective   History of Present Illness  Mr. Camarena is an 89 yr old male with history of permanent atrial fibrillation (s/p Watchman device), CAD, CHF, diabetes, COPD, hypertension, hyperlipidemia, chronic urinary retention requiring in/out self caths, who presents to the emergency department with complaints of lightheadedness and generalized weakness over the past 2 to 3 days.  The patient was at his urologist office today for his chronic urinary retention and was noted to be lightheaded and somewhat hypotensive (92/60) he was sent to the emergency department for further evaluation.  The patient notes that he has had some recent nosebleeds and has seen ear nose and throat for that.  His most recent nosebleed was 4 days ago.  The patient also notes that he has had unintentional weight loss of about 40 pounds  over the past 3 to 4 months.  He states that he feels like he has had relatively good appetite.  There has been no recent vomiting or diarrhea.  He believes he has his usual urine output.  He does note that he takes metoprolol 100 mg for rate control and states he took that this morning.  He denies any chest pain or shortness of breath.  He notes that he has mostly been lightheaded on standing and that after standing for a moment or 2, his symptoms improved.  His symptoms were somewhat worse this morning.      Review of Systems   Constitutional:  Positive for fatigue and unexpected weight change. Negative for appetite change and fever.   HENT:  Positive for nosebleeds. Negative for sore throat.    Respiratory:  Negative for cough and shortness of breath.    Cardiovascular:  Negative for chest pain, palpitations and leg swelling.   Gastrointestinal:  Negative for abdominal pain, blood in stool, diarrhea, nausea and vomiting.   Genitourinary:  Negative for dysuria and hematuria.        Chronic urinary retention.  Patient self caths.   Musculoskeletal:  Negative for back pain.   Skin:  Negative for pallor.   Neurological:  Positive for weakness (Generalized) and light-headedness. Negative for speech difficulty and headaches.   Hematological:  Bruises/bleeds easily.   Psychiatric/Behavioral:  Negative for confusion.        Past Medical History:   Diagnosis Date    Arrhythmia     Atrial fibrillation     Chronic kidney disease     COPD (chronic obstructive pulmonary disease)     Coronary artery disease     Diabetes mellitus     doesnt check sugar    E. coli sepsis 06/23/2022    Enlarged prostate without lower urinary tract symptoms (luts) 06/20/2016    Full dentures     GERD (gastroesophageal reflux disease)     Gout     Hearing aid worn     bilat prn    History of colonoscopy 09/12/2012    History of radiation therapy 02/24/2023    SBRT LLL lung    Native (hard of hearing)     hearing aids prn    Hyperlipidemia      Hypertension     Kidney stone     surgery x1    Lung cancer     STEVEN on CPAP     compliant with machine    PAF (paroxysmal atrial fibrillation)     Prostatism     Urinary incontinence     Urinary tract infection     Wears glasses     readers       Allergies   Allergen Reactions    Sglt2 Inhibitors Other (See Comments)     Recurrent UTI    Xarelto [Rivaroxaban] GI Bleeding     GI bleed    Penicillins Hives     Has tolerated cefepime, ceftriaxone, cefazolin, cefdinir, cefuroxime, cephalexin       Past Surgical History:   Procedure Laterality Date    ATRIAL APPENDAGE EXCLUSION LEFT WITH TRANSESOPHAGEAL ECHOCARDIOGRAM N/A 11/28/2023    Procedure: Atrial Appendage Occlusion;  Surgeon: Anthony Mcdaniel MD;  Location:  [facility];  Service: Cardiovascular;  Laterality: N/A;    CARDIAC CATHETERIZATION Left 09/29/2022    Procedure: Left Heart Cath;  Surgeon: Titus Oliveros IV, MD;  Location:  [facility];  Service: Cardiovascular;  Laterality: Left;    CARDIOVERSION      CATARACT EXTRACTION Bilateral     COLONOSCOPY      CYSTOSCOPY      ENDOSCOPY      possible    ENDOSCOPY N/A 9/5/2024    Procedure: ESOPHAGOGASTRODUODENOSCOPY;  Surgeon: Anthony Arambula MD;  Location:  [facility];  Service: Gastroenterology;  Laterality: N/A;  Esophagus dilated to 18mm with 12mm-mm to 15mm, then 15mm to 18mm balloon.    ENDOSCOPY N/A 10/31/2024    Procedure: ESOPHAGOGASTRODUODENOSCOPY WITH BIOPSY;  Surgeon: Carlos Dior MD;  Location:  [facility];  Service: Gastroenterology;  Laterality: N/A;    ERCP N/A 9/5/2024    Procedure: ENDOSCOPIC RETROGRADE CHOLANGIOPANCREATOGRAPHY;  Surgeon: Anthony Arambula MD;  Location:  [facility];  Service: Gastroenterology;  Laterality: N/A;  Sphincterotomy made to ampula of common bile duct (CBD), CBD swept with 9mm-12mm balloon - sludge, stones and purulent appearing drainage extracted. 10x7 Gabonese biliary stent depolyed into CBD. ERCP scope  removed with balloon intact.    ERCP N/A 10/31/2024    Procedure: ENDOSCOPIC RETROGRADE CHOLANGIOPANCREATOGRAPHY;  Surgeon: Carlos Dior MD;  Location: [facility] ENDOSCOPY;  Service: Gastroenterology;  Laterality: N/A;    KIDNEY STONE SURGERY      x1       Family History   Problem Relation Age of Onset    Alzheimer's disease Mother     COPD Father     Lung cancer Father     Cancer Father     Kidney disease Father     No Known Problems Daughter     No Known Problems Daughter     No Known Problems Daughter        Social History     Socioeconomic History    Marital status:    Tobacco Use    Smoking status: Former     Current packs/day: 0.00     Average packs/day: 1 pack/day for 15.0 years (15.0 ttl pk-yrs)     Types: Cigarettes     Start date: 1947     Quit date: 1960     Years since quittin.6     Passive exposure: Past    Smokeless tobacco: Former     Types: Chew     Quit date:     Tobacco comments:     started at 13 yo.   Vaping Use    Vaping status: Never Used    Passive vaping exposure: Yes   Substance and Sexual Activity    Alcohol use: No    Drug use: Never    Sexual activity: Defer     Partners: Female           Objective   Physical Exam  Constitutional:       General: He is not in acute distress.     Appearance: He is not ill-appearing, toxic-appearing or diaphoretic.   HENT:      Head: Normocephalic.      Nose: Nose normal.      Mouth/Throat:      Mouth: Mucous membranes are moist.   Eyes:      General: No scleral icterus.     Conjunctiva/sclera: Conjunctivae normal.      Pupils: Pupils are equal, round, and reactive to light.   Cardiovascular:      Rate and Rhythm: Tachycardia present. Rhythm irregular.      Pulses: Normal pulses.      Comments: Irregularly irregular rhythm with a rate of about 110  Pulmonary:      Effort: Pulmonary effort is normal.      Breath sounds: Normal breath sounds. No wheezing, rhonchi or rales.   Chest:      Chest wall: No tenderness.    Abdominal:      Palpations: Abdomen is soft.      Tenderness: There is no abdominal tenderness. There is no guarding.   Musculoskeletal:      Cervical back: Neck supple.      Right lower leg: No edema.      Left lower leg: No edema.   Skin:     Coloration: Skin is not jaundiced or pale.   Neurological:      General: No focal deficit present.      Mental Status: He is alert and oriented to person, place, and time.      Comments: Normal speech.  Normal facial symmetry.  Normal mentation.  Equal  bilaterally.  No drift.  Normal leg strength.  Normal finger-to-nose and normal heel-to-shin.   Psychiatric:         Mood and Affect: Mood normal.         Procedures          ED Course      In summary, 89-year-old male with history of permanent atrial fibrillation (s/p Watchman device), CAD, CHF, diabetes, COPD, hypertension, hyperlipidemia, chronic urinary retention requiring in/out self caths, who presents to the emergency department with complaints of lightheadedness and generalized weakness over the past 2 to 3 days.   Patient was at his urologist office today and was noted to be somewhat hypotensive (92/60) and was lightheaded.  He was sent to the emergency department for further evaluation.  Patient notes that he has had some recent unintentional weight loss of about 40 pounds over the past 3 to 4 months.  No recent vomiting or diarrhea.  He has had some recent nosebleeds for which she has been seen by ENT.  Normal appetite.    MDM: Differential includes orthostasis, dehydration, renal failure, electrolyte abnormality, infection, etc.    Basic labs and UA obtained.  EKG and CXR ordered.  Orthostatics ordered.  Pt given a liter of saline.    14:22 EST  White count is normal at 9.69.  No anemia.    Glucose is 106.  Creatinine is normal at 1.19.  Normal chemistries.    HS Troponin is 36 and this appears to be chronic when compared to priors.    Normal procalcitonin at 0.05.      EKG read by me shows atrial  fibrillation with a rate of 106.    Chest x-ray:  Improved left lower lobe infiltrate. No new abnormality.     The patient mentioned that he had had unintentional weight loss of 30 to 40 pounds over the past 3 to 4 months.  I reviewed patient's prior records and I see that he has had CTs of the head, chest and abdomen within the past year with no evidence of malignancy.      The RN alerted me that she was unsuccessful at getting a cath UA.  I spoke with the pt about this and he indicates that he has trouble at times at self cathing.  He notes he had a cath UA at the urologist's office today.  I reviewed the lab results on that urine and unfortunately, it was a dipstick UA with no microscopy.  It appeared cloudy and had large leukocyte esterase, nitrite negative.  I attempted a cath UA myself but was unable to pass the catheter.  The pt, who has self cathed for at least 2 years, was also unable to pass a catheter.  I had the RN do a bladder scan and she states it showed 0ml.  The pt states he normally uses a more rigid cathether and has some in the car and would like to try placing the more rigid catheter.  His daughter has some of the more rigid catheters in her car and will bring them in.      The pt has been intermittently hypotensive.  I think he may be a bit dry and also may have some lightheadedness from his chronic a-fib with RVR.  His rate has been in the 120s here.  He took metoprolol 100mg this morning and I don't think his BP can handle any more beta blockers at the moment.  I gave him a single dose of diltiazem 10mg IV and his rate slowed into the 90s.      His daughter notes he lives alone and is concerned about his lightheadedness, etc.  Gwendolyn notes that he has recurrent UTIs and had been septic in the past.  He is followed by Dr. Og with ID.  I spoke with Dr. Araujo about the pt and he feels that it would be reasonable to bring him in for hydration, rate control, urology consult, etc.  I will plan to  give him a dose of IV abx after blood cultures obtained.                                                                                     Medical Decision Making      Final diagnoses:   Orthostasis   Light headedness   Chronic atrial fibrillation with rapid ventricular response   Acute on chronic urinary retention       ED Disposition  ED Disposition       ED Disposition   Decision to Admit    Condition   --    Comment   --               No follow-up provider specified.       Medication List      No changes were made to your prescriptions during this visit.            Delfino Forbes PA  12/05/24 1408       Delfino Forbes PA  12/05/24 1422      Electronically signed by Alexx Araujo MD at 12/05/24 1500       Vital Signs (last day)       Date/Time Temp Temp src Pulse Resp BP Patient Position SpO2    12/06/24 0903 -- -- 131 -- 85/60 Standing --    12/06/24 0858 -- -- 133 -- 107/75 Sitting --    12/06/24 0857 -- -- 118 -- 115/89 Lying --    12/06/24 0710 97.8 (36.6) Axillary 118 18 106/78 Lying 93    12/06/24 0600 -- -- 106 -- -- -- 93    12/06/24 0500 -- -- 104 -- -- -- 95    12/06/24 0436 -- -- 110 -- 108/74 Sitting 93    12/06/24 0433 -- -- 114 -- 115/82 Sitting --    12/06/24 0432 97.8 (36.6) Oral 111 16 108/81 Lying 92    12/06/24 0300 -- -- 100 -- -- -- 94    12/06/24 0200 -- -- 131 -- -- -- 93    12/06/24 0100 -- -- 116 -- -- -- 93    12/05/24 2318 98 (36.7) Oral 96 18 108/94 Lying 96    12/05/24 2100 -- -- 92 -- -- -- 95    12/05/24 1922 97.8 (36.6) Oral 113 22 112/86 Lying 96    12/05/24 1715 -- -- 107 -- -- -- 91    12/05/24 1645 98 (36.7) -- 112 18 117/79 -- 96    12/05/24 1615 -- -- 104 -- 115/81 -- --    12/05/24 1600 -- -- 110 -- 119/85 -- 98    12/05/24 1545 -- -- 108 -- 110/87 -- 92    12/05/24 1530 -- -- 105 -- 120/87 -- 96    12/05/24 1515 -- -- 106 -- 99/73 -- 93    12/05/24 1500 -- -- 112 -- 114/84 -- 96    12/05/24 1445 -- -- 112 -- 117/87 -- 95    12/05/24 1430 -- -- 115 -- 115/83  -- --    12/05/24 1330 -- -- 100 -- 119/89 -- 96    12/05/24 1315 -- -- -- -- 107/81 -- --    12/05/24 1314 -- -- 99 -- -- -- 97    12/05/24 1302 -- -- 115 -- -- -- --    12/05/24 1300 -- -- 111 -- 105/81 -- --    12/05/24 1245 -- -- 105 -- 98/57 -- --    12/05/24 1230 -- -- 105 -- 122/78 -- 98    12/05/24 1215 -- -- 107 -- 113/95 -- 90    12/05/24 1139 -- -- 115 -- 67/44 Standing --    12/05/24 1137 -- -- 110 -- 118/83 Sitting --    12/05/24 1136 97.7 (36.5) Oral -- -- -- -- --    12/05/24 1135 -- -- 121 -- 109/79 Lying --    12/05/24 1012 -- -- 96 16 101/75 Sitting 99          Oxygen Therapy (last day)       Date/Time SpO2 Device (Oxygen Therapy) Flow (L/min) (Oxygen Therapy) Oxygen Concentration (%) ETCO2 (mmHg)    12/06/24 0710 93 -- -- -- --    12/06/24 0610 -- room air -- -- --    12/06/24 0600 93 -- -- -- --    12/06/24 0500 95 -- -- -- --    12/06/24 0436 93 -- -- -- --    12/06/24 0432 92 -- -- -- --    12/06/24 0402 -- room air -- -- --    12/06/24 0300 94 -- -- -- --    12/06/24 0200 93 -- -- -- --    12/06/24 0146 -- room air -- -- --    12/06/24 0100 93 -- -- -- --    12/06/24 0015 -- room air -- -- --    12/05/24 2318 96 -- -- -- --    12/05/24 2210 -- room air -- -- --    12/05/24 2130 -- room air -- -- --    12/05/24 2100 95 -- -- -- --    12/05/24 1922 96 -- -- -- --    12/05/24 1715 91 room air -- -- --    12/05/24 1645 96 -- -- -- --    12/05/24 1600 98 -- -- -- --    12/05/24 1545 92 -- -- -- --    12/05/24 1530 96 -- -- -- --    12/05/24 1515 93 -- -- -- --    12/05/24 1500 96 -- -- -- --    12/05/24 1445 95 -- -- -- --    12/05/24 1330 96 -- -- -- --    12/05/24 1314 97 -- -- -- --    12/05/24 1230 98 -- -- -- --    12/05/24 1215 90 -- -- -- --    12/05/24 1012 99 room air -- -- --          Lines, Drains & Airways       Active LDAs       Name Placement date Placement time Site Days    Peripheral IV 12/05/24 1040 Left Antecubital 12/05/24  1040  Antecubital  less than 1    Urethral Catheter  Non-latex;Silicone 16 Fr. 12/05/24 2130  -- less than 1                  Current Facility-Administered Medications   Medication Dose Route Frequency Provider Last Rate Last Admin    albuterol (PROVENTIL) nebulizer solution 0.083% 2.5 mg/3mL  2.5 mg Nebulization Q4H PRN Cindy Wade, [facility]        amiodarone (PACERONE) tablet 200 mg  200 mg Oral Q12H Jose Dennis MD        sennosides-docusate (PERICOLACE) 8.6-50 MG per tablet 2 tablet  2 tablet Oral BID PRN Junaid Acosta MD        And    polyethylene glycol (MIRALAX) packet 17 g  17 g Oral Daily PRN Junaid Acosta MD        And    bisacodyl (DULCOLAX) EC tablet 5 mg  5 mg Oral Daily PRN Junaid Acosta MD        And    bisacodyl (DULCOLAX) suppository 10 mg  10 mg Rectal Daily PRN Junaid Acosta MD        Calcium Replacement - Follow Nurse / BPA Driven Protocol   Not Applicable PRN Junaid Acosta MD        dextrose (D50W) (25 g/50 mL) IV injection 25 g  25 g Intravenous Q15 Min PRN Junaid Acosta MD        dextrose (GLUTOSE) oral gel 15 g  15 g Oral Q15 Min PRN Junaid Acosta MD        donepezil (ARICEPT) tablet 10 mg  10 mg Oral Nightly Junaid Acosta MD   10 mg at 12/05/24 2139    ertapenem (INVanz) 1,000 mg in sodium chloride 0.9 % 100 mL MBP  1,000 mg Intravenous Q24H Clarence Samaniego MD        finasteride (PROSCAR) tablet 5 mg  5 mg Oral Daily Junaid Acosta MD   5 mg at 12/05/24 1749    glucagon (GLUCAGEN) injection 1 mg  1 mg Intramuscular Q15 Min PRN Junaid Acosta MD        Insulin Lispro (humaLOG) injection 2-7 Units  2-7 Units Subcutaneous 4x Daily AC & at Bedtime Junaid Acosta MD        Magnesium Standard Dose Replacement - Follow Nurse / BPA Driven Protocol   Not Applicable PRN Junaid Acosta MD        memantine (NAMENDA) tablet 10 mg  10 mg Oral BID Junaid Acosta MD   10 mg at 12/05/24 2139    ondansetron (ZOFRAN) injection 4 mg  4 mg Intravenous Q6H PRN Junaid Acosta MD        pantoprazole (PROTONIX) EC tablet 40 mg  40  mg Oral Q AM Junaid Acosta MD   40 mg at 12/06/24 0447    Phosphorus Replacement - Follow Nurse / BPA Driven Protocol   Not Applicable PRN Junaid Acosta MD        Potassium Replacement - Follow Nurse / BPA Driven Protocol   Not Applicable PRN Junaid Acosta MD        pravastatin (PRAVACHOL) tablet 40 mg  40 mg Oral Daily Junaid Acosta MD        sertraline (ZOLOFT) tablet 50 mg  50 mg Oral Daily Junaid Acosta MD        sodium chloride 0.9 % flush 10 mL  10 mL Intravenous PRN Alexx Araujo MD        sodium chloride 0.9 % flush 10 mL  10 mL Intravenous Q12H Junaid Acosta MD        sodium chloride 0.9 % flush 10 mL  10 mL Intravenous PRN Junaid Acosta MD        sodium chloride 0.9 % infusion 40 mL  40 mL Intravenous PRN Junaid Acosta MD        sodium chloride 0.9 % infusion  75 mL/hr Intravenous Continuous Junaid Acosta MD 75 mL/hr at 12/05/24 1826 75 mL/hr at 12/05/24 1826     Lab Results (last 24 hours)       Procedure Component Value Units Date/Time    Basic Metabolic Panel [[id number]]  (Abnormal) Collected: 12/06/24 0835    Specimen: Blood Updated: 12/06/24 0906     Glucose 119 mg/dL      BUN 31 mg/dL      Creatinine 0.94 mg/dL      Sodium 138 mmol/L      Potassium 4.4 mmol/L      Chloride 105 mmol/L      CO2 27.0 mmol/L      Calcium 8.6 mg/dL      BUN/Creatinine Ratio 33.0     Anion Gap 6.0 mmol/L      eGFR 78.0 mL/min/1.73     Narrative:      GFR Normal >60  Chronic Kidney Disease <60  Kidney Failure <15    The GFR formula is only valid for adults with stable renal function between ages 18 and 70.    POC Glucose Once [[id number]]  (Abnormal) Collected: 12/06/24 0710    Specimen: Blood Updated: 12/06/24 0711     Glucose 135 mg/dL     POC Glucose Once [[id number]]  (Normal) Collected: 12/05/24 1924    Specimen: Blood Updated: 12/05/24 1925     Glucose 103 mg/dL     POC Glucose Once [[id number]]  (Abnormal) Collected: 12/05/24 1706    Specimen: Blood Updated: 12/05/24 1708     Glucose 61 mg/dL      Blood Culture - Blood, Hand, Left [[id number]] Collected: 12/05/24 1451    Specimen: Blood from Hand, Left Updated: 12/05/24 1505    Blood Culture - Blood, Hand, Right [[id number]] Collected: 12/05/24 1451    Specimen: Blood from Hand, Right Updated: 12/05/24 1505    Urinalysis, Microscopic Only - Urine, Catheter [[id number]]  (Abnormal) Collected: 12/05/24 1432    Specimen: Urine, Catheter Updated: 12/05/24 1456     RBC, UA 0-2 /HPF      WBC, UA 21-50 /HPF      Bacteria, UA 4+ /HPF      Squamous Epithelial Cells, UA None Seen /HPF      Hyaline Casts, UA 0-6 /LPF      Methodology Automated Microscopy    Urine Culture - Urine, Urine, Catheter [[id number]] Collected: 12/05/24 1432    Specimen: Urine, Catheter Updated: 12/05/24 1456    Urinalysis With Culture If Indicated - Urine, Catheter [[id number]]  (Abnormal) Collected: 12/05/24 1432    Specimen: Urine, Catheter Updated: 12/05/24 1455     Color, UA Yellow     Appearance, UA Clear     pH, UA 5.5     Specific Gravity, UA 1.010     Glucose, UA Negative     Ketones, UA Negative     Bilirubin, UA Negative     Blood, UA Negative     Protein, UA Negative     Leuk Esterase, UA Moderate (2+)     Nitrite, UA Positive     Urobilinogen, UA 0.2 E.U./dL    Narrative:      In absence of clinical symptoms, the presence of pyuria, bacteria, and/or nitrites on the urinalysis result does not correlate with infection.    STAT Lactic Acid, Reflex [[id number]]  (Abnormal) Collected: 12/05/24 1350    Specimen: Blood Updated: 12/05/24 1416     Lactate 2.9 mmol/L      Comment: Falsely depressed results may occur on samples drawn from patients receiving N-Acetylcysteine (NAC) or Metamizole.       Procalcitonin [[id number]]  (Normal) Collected: 12/05/24 1034    Specimen: Blood Updated: 12/05/24 1141     Procalcitonin 0.05 ng/mL     Narrative:      As a Marker for Sepsis (Non-Neonates):    1. <0.5 ng/mL represents a low risk of severe sepsis and/or septic shock.  2. >2 ng/mL represents a high risk  "of severe sepsis and/or septic shock.    As a Marker for Lower Respiratory Tract Infections that require antibiotic therapy:    PCT on Admission    Antibiotic Therapy       6-12 Hrs later    >0.5                Strongly Recommended  >0.25 - <0.5        Recommended   0.1 - 0.25          Discouraged              Remeasure/reassess PCT  <0.1                Strongly Discouraged     Remeasure/reassess PCT    As 28 day mortality risk marker: \"Change in Procalcitonin Result\" (>80% or <=80%) if Day 0 (or Day 1) and Day 4 values are available. Refer to http://www.[facility]-pct-calculator.com    Change in PCT <=80%  A decrease of PCT levels below or equal to 80% defines a positive change in PCT test result representing a higher risk for 28-day all-cause mortality of patients diagnosed with severe sepsis for septic shock.    Change in PCT >80%  A decrease of PCT levels of more than 80% defines a negative change in PCT result representing a lower risk for 28-day all-cause mortality of patients diagnosed with severe sepsis or septic shock.       Lactic Acid, Plasma [[id number]]  (Abnormal) Collected: 12/05/24 1034    Specimen: Blood Updated: 12/05/24 1115     Lactate 3.0 mmol/L      Comment: Falsely depressed results may occur on samples drawn from patients receiving N-Acetylcysteine (NAC) or Metamizole.       Comprehensive Metabolic Panel [[id number]]  (Abnormal) Collected: 12/05/24 1034    Specimen: Blood Updated: 12/05/24 1114     Glucose 106 mg/dL      BUN 40 mg/dL      Creatinine 1.19 mg/dL      Sodium 142 mmol/L      Potassium 5.0 mmol/L      Comment: Slight hemolysis detected by analyzer. Result may be falsely elevated.        Chloride 100 mmol/L      CO2 29.0 mmol/L      Calcium 9.7 mg/dL      Total Protein 7.7 g/dL      Albumin 3.5 g/dL      ALT (SGPT) 18 U/L      AST (SGOT) 41 U/L      Alkaline Phosphatase 221 U/L      Total Bilirubin 0.9 mg/dL      Globulin 4.2 gm/dL      Comment: Calculated Result        A/G Ratio 0.8 " g/dL      BUN/Creatinine Ratio 33.6     Anion Gap 13.0 mmol/L      eGFR 58.8 mL/min/1.73     Narrative:      GFR Normal >60  Chronic Kidney Disease <60  Kidney Failure <15    The GFR formula is only valid for adults with stable renal function between ages 18 and 70.    Magnesium [[id number]]  (Normal) Collected: 12/05/24 1034    Specimen: Blood Updated: 12/05/24 1114     Magnesium 1.7 mg/dL     Single High Sensitivity Troponin T [[id number]]  (Abnormal) Collected: 12/05/24 1034    Specimen: Blood Updated: 12/05/24 1113     HS Troponin T 36 ng/L     Narrative:      High Sensitive Troponin T Reference Range:  <14.0 ng/L- Negative Female for AMI  <22.0 ng/L- Negative Male for AMI  >=14 - Abnormal Female indicating possible myocardial injury.  >=22 - Abnormal Male indicating possible myocardial injury.   Clinicians would have to utilize clinical acumen, EKG, Troponin, and serial changes to determine if it is an Acute Myocardial Infarction or myocardial injury due to an underlying chronic condition.         CBC & Differential [[id number]]  (Abnormal) Collected: 12/05/24 1034    Specimen: Blood Updated: 12/05/24 1052    Narrative:      The following orders were created for panel order CBC & Differential.  Procedure                               Abnormality         Status                     ---------                               -----------         ------                     CBC Auto Differential[[id number]]        Abnormal            Final result                 Please view results for these tests on the individual orders.    CBC Auto Differential [[id number]]  (Abnormal) Collected: 12/05/24 1034    Specimen: Blood Updated: 12/05/24 1052     WBC 9.69 10*3/mm3      RBC 4.37 10*6/mm3      Hemoglobin 13.6 g/dL      Hematocrit 43.7 %      .0 fL      MCH 31.1 pg      MCHC 31.1 g/dL      RDW 14.9 %      RDW-SD 55.0 fl      MPV 11.0 fL      Platelets 252 10*3/mm3      Neutrophil % 76.2 %      Lymphocyte % 15.2 %       Monocyte % 5.0 %      Eosinophil % 2.6 %      Basophil % 0.7 %      Immature Grans % 0.3 %      Neutrophils, Absolute 7.39 10*3/mm3      Lymphocytes, Absolute 1.47 10*3/mm3      Monocytes, Absolute 0.48 10*3/mm3      Eosinophils, Absolute 0.25 10*3/mm3      Basophils, Absolute 0.07 10*3/mm3      Immature Grans, Absolute 0.03 10*3/mm3      nRBC 0.0 /100 WBC     [place] Draw [[id number]] Collected: 12/05/24 1034    Specimen: Blood Updated: 12/05/24 1046    Narrative:      The following orders were created for panel order [place] Draw.  Procedure                               Abnormality         Status                     ---------                               -----------         ------                     Green Top (Gel)[[id number]]                                  Final result               Lavender Top[[id number]]                                     Final result               Gold Top - SST[[id number]]                                   Final result               Gray Top[[id number]]                                         Final result               Light Blue Top[[id number]]                                   Final result                 Please view results for these tests on the individual orders.    Green Top (Gel) [[id number]] Collected: 12/05/24 1034    Specimen: Blood Updated: 12/05/24 1046     Extra Tube Hold for add-ons.     Comment: Auto resulted.       Lavender Top [[id number]] Collected: 12/05/24 1034    Specimen: Blood Updated: 12/05/24 1046     Extra Tube hold for add-on     Comment: Auto resulted       Gold Top - SST [[id number]] Collected: 12/05/24 1034    Specimen: Blood Updated: 12/05/24 1046     Extra Tube Hold for add-ons.     Comment: Auto resulted.       Gray Top [[id number]] Collected: 12/05/24 1034    Specimen: Blood Updated: 12/05/24 1046     Extra Tube Hold for add-ons.     Comment: Auto resulted.       Light Blue Top [[id number]] Collected: 12/05/24 1034    Specimen: Blood Updated: 12/05/24 1046      Extra Tube Hold for add-ons.     Comment: Auto resulted             Imaging Results (Last 24 Hours)       Procedure Component Value Units Date/Time    XR Chest 1 View [[id number]] Collected: 12/05/24 1134     Updated: 12/05/24 1139    Narrative:      XR CHEST 1 VW    Date of Exam: 12/5/2024 11:07 AM EST    Indication: Weak/Dizzy/AMS triage protocol    Comparison: 11/6/2024    Findings:  Left lower lobe infiltrate has improved with minimal infiltrate remaining. Heart size is normal. Right lung is clear. There are no effusions.      Impression:      Impression:  Improved left lower lobe infiltrate. No new abnormality.      Electronically Signed: Aster Mathur MD    12/5/2024 11:36 AM EST    Workstation ID: [id number]          ECG/EMG Results (last 24 hours)       Procedure Component Value Units Date/Time    ECG 12 Lead ED Triage Standing Order; Weak / Dizzy / AMS [[id number]] Collected: 12/05/24 1048     Updated: 12/05/24 1330     QT Interval 354 ms      QTC Interval 470 ms     Narrative:      Test Reason : dizziness  Blood Pressure :   */*   mmHG  Vent. Rate : 106 BPM     Atrial Rate : 110 BPM     P-R Int :   * ms          QRS Dur :  90 ms      QT Int : 354 ms       P-R-T Axes :   *  26 155 degrees    QTcB Int : 470 ms    Atrial fibrillation with rapid ventricular response  Nonspecific ST and T wave abnormality  Abnormal ECG  When compared with ECG of 25-Oct-2024 11:09,  No significant change was found  Confirmed by NAVI LEVY MD ([id number]) on 12/5/2024 1:30:16 PM    Referred By: ed md           Confirmed By: NAVI LEVY MD    Telemetry Scan [[id number]] Resulted: 12/05/24     Updated: 12/06/24 0312          Physician Progress Notes (last 24 hours)  Notes from 12/05/24 0934 through 12/06/24 0934   No notes of this type exist for this encounter.          Consult Notes (last 24 hours)        Jose Dennis MD at 12/06/24 0839        Consult Orders    1. Inpatient Cardiology Consult [[id number]] ordered by Karla  MD Junaid at 12/05/24 6876                 [facility]  Cardiovascular Consultation Note    Reason for consultation: #1 A-fib RVR #2 orthostatic hypotension 3 history of heart failure with reduced EF    History of Present Illness:  89-year-old male with permanent A-fib status post watchman, heart failure with reduced EF, chronic prostate problems requiring In-N-Out cath, frequent UTIs, diabetes, CKD, hypertension, hyperlipidemia has chronically elevated high sensitive troponin.  The patient was sent over here yesterday from his urologist office because of dizziness and weakness and had a systolic blood pressure of 67.  Here vital signs were checked and the patient has orthostasis the patient was last seen in our office on 11/18/2024 patient had negative cardiac cath 2022.  The patient acknowledges at home whenever he stood up he felt very lightheaded.  He states that after receiving IV fluids this is resolved.  He denies any history of lower extremity edema.  He denies any dyspnea on exertion.  He denies tightness heaviness squeezing pressure chest jaw throat arms.  He occasionally wheezes which responds promptly to inhalers.  Patient states he feels really good.  He denies palpitations despite having a rapid heartbeat    Cardiac risk factors: #1 male sex #2 age greater than 55 #3 hypertension #4 hyperlipidemia #5 diabetes  [id number] score is 5-status post watchman    Past medical and surgical history, social and family history reviewed in EMR.    REVIEW OF SYSTEMS:     Past Medical History:   Diagnosis Date    Arrhythmia     Atrial fibrillation     Chronic kidney disease     COPD (chronic obstructive pulmonary disease)     Coronary artery disease     Dementia     Diabetes mellitus     doesnt check sugar    E. coli sepsis 06/23/2022    Enlarged prostate without lower urinary tract symptoms (luts) 06/20/2016    Full dentures     GERD (gastroesophageal reflux disease)      Gout     Hearing aid worn     bilat prn    History of colonoscopy 09/12/2012    History of radiation therapy 02/24/2023    SBRT LLL lung    [place] (hard of hearing)     hearing aids prn    Hyperlipidemia     Hypertension     Kidney stone     surgery x1    Lung cancer     STEVEN on CPAP     compliant with machine    PAF (paroxysmal atrial fibrillation)     Prostatism     Urinary incontinence     Urinary tract infection     Wears glasses     readers     Past Surgical History:   Procedure Laterality Date    ATRIAL APPENDAGE EXCLUSION LEFT WITH TRANSESOPHAGEAL ECHOCARDIOGRAM N/A 11/28/2023    Procedure: Atrial Appendage Occlusion;  Surgeon: Anthony Mcdaniel MD;  Location:  [facility];  Service: Cardiovascular;  Laterality: N/A;    CARDIAC CATHETERIZATION Left 09/29/2022    Procedure: Left Heart Cath;  Surgeon: Titus Oliveros IV, MD;  Location:  [facility];  Service: Cardiovascular;  Laterality: Left;    CARDIOVERSION      CATARACT EXTRACTION Bilateral     COLONOSCOPY      CYSTOSCOPY      ENDOSCOPY      possible    ENDOSCOPY N/A 9/5/2024    Procedure: ESOPHAGOGASTRODUODENOSCOPY;  Surgeon: Anthony Arambula MD;  Location:  [facility];  Service: Gastroenterology;  Laterality: N/A;  Esophagus dilated to 18mm with 12mm-mm to 15mm, then 15mm to 18mm balloon.    ENDOSCOPY N/A 10/31/2024    Procedure: ESOPHAGOGASTRODUODENOSCOPY WITH BIOPSY;  Surgeon: Carlos Dior MD;  Location:  [facility];  Service: Gastroenterology;  Laterality: N/A;    ERCP N/A 9/5/2024    Procedure: ENDOSCOPIC RETROGRADE CHOLANGIOPANCREATOGRAPHY;  Surgeon: Anthony Arambula MD;  Location:  [facility];  Service: Gastroenterology;  Laterality: N/A;  Sphincterotomy made to ampula of common bile duct (CBD), CBD swept with 9mm-12mm balloon - sludge, stones and purulent appearing drainage extracted. 10x7 British biliary stent depolyed into CBD. ERCP scope removed with balloon intact.    ERCP N/A  10/31/2024    Procedure: ENDOSCOPIC RETROGRADE CHOLANGIOPANCREATOGRAPHY;  Surgeon: Carlos Dior MD;  Location: [facility] ENDOSCOPY;  Service: Gastroenterology;  Laterality: N/A;    KIDNEY STONE SURGERY      x1     Social History     Socioeconomic History    Marital status:    Tobacco Use    Smoking status: Former     Current packs/day: 0.00     Average packs/day: 1 pack/day for 15.0 years (15.0 ttl pk-yrs)     Types: Cigarettes     Start date: 1947     Quit date: 1960     Years since quittin.6     Passive exposure: Past    Smokeless tobacco: Former     Types: Chew     Quit date:     Tobacco comments:     started at 15 yo.   Vaping Use    Vaping status: Never Used    Passive vaping exposure: Yes   Substance and Sexual Activity    Alcohol use: No    Drug use: Never    Sexual activity: Defer     Partners: Female     Family History   Problem Relation Age of Onset    Alzheimer's disease Mother     COPD Father     Lung cancer Father     Cancer Father     Kidney disease Father     No Known Problems Daughter     No Known Problems Daughter     No Known Problems Daughter        H&P ROS reviewed and pertinent CV ROS as noted in HPI.    Cardiac: Has known permanent atrial fibrillation and heart failure with reduced EF.  He denies shortness of breath no anginal type pain no edema  Respiratory: Has COPD/wheezes occasionally but this responds to inhaler  Lower Extremities: Denies edema  GI: No nausea vomiting diarrhea bright blood per rectum or melena  Neuro: No history of stroke TIA or neurologic event  Hematology: Has history of anemia  Renal: History of CKD  Musculoskeletal: Does not complain of any specific joint pain  Endocrine: Has diabetes but no hypothyroidism  Constitutional: States his appetite is fine.  Denies fever chills  Psych: Has dementia but no tarah depression      Physical Exam: General Very pleasant well-developed male sitting in bed not dyspneic not tachypneic but is  tachycardic       HEENT: No JVP.  No bruits.  No icterus       Respiratory: Equal bilateral symmetrical expansion with a rare crackle on the left.  No wheezes or rhonchi       Cardiovascular: Tachycardic and irregular without any obvious murmur no edema to palpation       GI: Soft and flat       Lower Extremities: No lesions       Neuro: Facial expressions are symmetrical moves all 4 extremities       Skin: Warm and dry no edema palpation       Psych: Pleasant affect    Results Review: EKG shows A-fib RVR nonspecific ST abnormalities with no significant change from prior EKGs.  HST is elevated but they are chronically elevated.  BNP is elevated.  GFR is 58 the most recent one prior to that was 61.9.  Hemoglobin is 13.6.  The BNP level is similar to previous BNP June 2024      Objective     Vital Sign Min/Max for last 24 hours  Temp  Min: 97.7 °F (36.5 °C)  Max: 98 °F (36.7 °C)   BP  Min: 67/44  Max: 122/78   Pulse  Min: 67  Max: 131   Resp  Min: 16  Max: 22   SpO2  Min: 90 %  Max: 99 %   No data recorded      Intake/Output Summary (Last 24 hours) at 12/6/2024 0839  Last data filed at 12/6/2024 0510  Gross per 24 hour   Intake 1100 ml   Output 800 ml   Net 300 ml             Current Facility-Administered Medications:     albuterol (PROVENTIL) nebulizer solution 0.083% 2.5 mg/3mL, 2.5 mg, Nebulization, Q4H PRN, Cindy Wade [facility]    sennosides-docusate (PERICOLACE) 8.6-50 MG per tablet 2 tablet, 2 tablet, Oral, BID PRN **AND** polyethylene glycol (MIRALAX) packet 17 g, 17 g, Oral, Daily PRN **AND** bisacodyl (DULCOLAX) EC tablet 5 mg, 5 mg, Oral, Daily PRN **AND** bisacodyl (DULCOLAX) suppository 10 mg, 10 mg, Rectal, Daily PRN, Junaid Acosta MD    Calcium Replacement - Follow Nurse / BPA Driven Protocol, , Not Applicable, PRN, Junaid Acosta MD    dextrose (D50W) (25 g/50 mL) IV injection 25 g, 25 g, Intravenous, Q15 Min PRN, Junaid Acosta MD    dextrose (GLUTOSE) oral gel 15 g, 15 g, Oral, Q15 Min PRNKarla,  MD Junaid    donepezil (ARICEPT) tablet 10 mg, 10 mg, Oral, Nightly, Junaid Acosta MD, 10 mg at 12/05/24 2139    ertapenem (INVanz) 1,000 mg in sodium chloride 0.9 % 100 mL MBP, 1,000 mg, Intravenous, Q24H, Clarence Samaniego MD    finasteride (PROSCAR) tablet 5 mg, 5 mg, Oral, Daily, Junaid Acosta MD, 5 mg at 12/05/24 1749    glucagon (GLUCAGEN) injection 1 mg, 1 mg, Intramuscular, Q15 Min PRN, Junaid Acosta MD    Insulin Lispro (humaLOG) injection 2-7 Units, 2-7 Units, Subcutaneous, 4x Daily AC & at Bedtime, Junaid Acosta MD    Magnesium Standard Dose Replacement - Follow Nurse / BPA Driven Protocol, , Not Applicable, PRN, Junaid Acosta MD    memantine (NAMENDA) tablet 10 mg, 10 mg, Oral, BID, Junaid Acosta MD, 10 mg at 12/05/24 2139    ondansetron (ZOFRAN) injection 4 mg, 4 mg, Intravenous, Q6H PRN, Junaid Acosta MD    pantoprazole (PROTONIX) EC tablet 40 mg, 40 mg, Oral, Q AM, Junaid Acosta MD, 40 mg at 12/06/24 0447    Phosphorus Replacement - Follow Nurse / BPA Driven Protocol, , Not Applicable, PRN, Junaid Acosta MD    Potassium Replacement - Follow Nurse / BPA Driven Protocol, , Not Applicable, PRN, Junaid Acosta MD    pravastatin (PRAVACHOL) tablet 40 mg, 40 mg, Oral, Daily, Junaid Acosta MD    sertraline (ZOLOFT) tablet 50 mg, 50 mg, Oral, Daily, Junaid Acosta MD    sodium chloride 0.9 % flush 10 mL, 10 mL, Intravenous, PRN, Alexx Araujo MD    sodium chloride 0.9 % flush 10 mL, 10 mL, Intravenous, Q12H, Junaid Acosta MD    sodium chloride 0.9 % flush 10 mL, 10 mL, Intravenous, PRN, Junaid Acosta MD    sodium chloride 0.9 % infusion 40 mL, 40 mL, Intravenous, PRNKarla Wycliffe, MD    sodium chloride 0.9 % infusion, 75 mL/hr, Intravenous, Continuous, Junaid Acosta MD, Last Rate: 75 mL/hr at 12/05/24 1826, 75 mL/hr at 12/05/24 1826    Lab Review:   Results from last 7 days   Lab Units 12/05/24  1034   WBC 10*3/mm3 9.69   HEMOGLOBIN g/dL 13.6   PLATELETS 10*3/mm3  252     Results from last 7 days   Lab Units 12/05/24  1034   SODIUM mmol/L 142   POTASSIUM mmol/L 5.0   CO2 mmol/L 29.0   BUN mg/dL 40*   CREATININE mg/dL 1.19   MAGNESIUM mg/dL 1.7   GLUCOSE mg/dL 106*     Estimated Creatinine Clearance: 57.5 mL/min (by C-G formula based on SCr of 1.19 mg/dL).        .lipd  Lab Results   Component Value Date    TRIG 83 05/07/2024    HDL 26 (L) 05/07/2024    AST 41 (H) 12/05/2024    ALT 18 12/05/2024       Radiology Reports:  Imaging Results (Last 72 Hours)       Procedure Component Value Units Date/Time    XR Chest 1 View [[id number]] Collected: 12/05/24 1134     Updated: 12/05/24 1139    Narrative:      XR CHEST 1 VW    Date of Exam: 12/5/2024 11:07 AM EST    Indication: Weak/Dizzy/AMS triage protocol    Comparison: 11/6/2024    Findings:  Left lower lobe infiltrate has improved with minimal infiltrate remaining. Heart size is normal. Right lung is clear. There are no effusions.      Impression:      Impression:  Improved left lower lobe infiltrate. No new abnormality.      Electronically Signed: Aster Mathur MD    12/5/2024 11:36 AM EST    Workstation ID: [id number]            Assessment/Plan: 1 A-fib RVR-the patient's heart rate is significantly elevated even just sitting in bed eating breakfast.  Unfortunately his blood pressure is tenuous so I cannot really increase his AV prabhu agents.  In addition of that he has CKD and I would like to avoid digoxin.  In addition he has orthostatic hypotension symptoms.  Although not ideal I will start the patient on p.o. amiodarone for better rate control.  If he has significant orthostatic hypotension following hydration we will have to lower his dose of beta-blockers.  2 orthostatic hypotension-clinically the patient is improved have ordered a repeat take of orthostatic vitals this morning  3 heart failure with reduced EF patient appears euvolemic i.e. no JVP, no shortness of breath  4 chronically elevated high-sensitivity  troponin-patient had nonobstructive coronary disease in , he denies tightness heaviness squeezing pressure chest jaw throat arms.  No ischemic evaluation is warranted      Jose Dennis MD  24  08:39 EST      Electronically signed by Jose Dennis MD at 24 0906       Krystian Larkin MD at 24 1910        Consult Orders    1. Inpatient Urology Consult [[id number]] ordered by Junaid Acosta MD at 24 1504                  [facility]   HISTORY AND PHYSICAL    Patient Name: Darien Camarena  : 1935  MRN: [id number]  Primary Care Physician:  Pito Way MD  Date of admission: 2024    Subjective   Subjective     Chief Complaint: Lightheadedness, generalized weakness, hypotension    HPI:    Darien Camarena is a 89 y.o. male who has a complex past medical history and notable for chronic urinary retention which is managed with intermittent catheterization.  He saw the nurse practitioner in the urology office today who performed intermittent catheterization and this proved to be fairly easy.  He was complaining of some generalized weakness and was found to be hypotensive and sent to the ER for further evaluation.      He has reported some difficulty performing self-catheterization at home.  I have seen this patient remotely and he has severe left-sided vesicoureteral reflux with chronic appearing hydroureteronephrosis and a very atrophic appearing left-sided kidney.  His renal function is elevated to 1.19 creatinine from baseline around 0.7/0.8.  He denies any significant flank pain.  Urinary tract infection is likely given UA finding showing nitrite positive urine with moderate leukocytes and bacteria.    His daughter is present in the room.  The patient does not endorse any significant symptoms at this time but appears to be a relatively poor historian.    He has a history of recurrent urinary tract infection and appears to be following with  infectious disease.  He has been managed with methenamine hippurate for UTI prophylaxis as well.    He has a mild lactic acidosis.  He has no significant leukocytosis but he does have a neutrophilic left shift.    Review of Systems   All systems were reviewed and negative except for: None    Personal History     Past Medical History:   Diagnosis Date    Arrhythmia     Atrial fibrillation     Chronic kidney disease     COPD (chronic obstructive pulmonary disease)     Coronary artery disease     Dementia     Diabetes mellitus     doesnt check sugar    E. coli sepsis 06/23/2022    Enlarged prostate without lower urinary tract symptoms (luts) 06/20/2016    Full dentures     GERD (gastroesophageal reflux disease)     Gout     Hearing aid worn     bilat prn    History of colonoscopy 09/12/2012    History of radiation therapy 02/24/2023    SBRT LLL lung    [place] (hard of hearing)     hearing aids prn    Hyperlipidemia     Hypertension     Kidney stone     surgery x1    Lung cancer     STEVEN on CPAP     compliant with machine    PAF (paroxysmal atrial fibrillation)     Prostatism     Urinary incontinence     Urinary tract infection     Wears glasses     readers       Past Surgical History:   Procedure Laterality Date    ATRIAL APPENDAGE EXCLUSION LEFT WITH TRANSESOPHAGEAL ECHOCARDIOGRAM N/A 11/28/2023    Procedure: Atrial Appendage Occlusion;  Surgeon: Anthony Mcdaniel MD;  Location:  [facility];  Service: Cardiovascular;  Laterality: N/A;    CARDIAC CATHETERIZATION Left 09/29/2022    Procedure: Left Heart Cath;  Surgeon: Titus Oliveros IV, MD;  Location:  [facility];  Service: Cardiovascular;  Laterality: Left;    CARDIOVERSION      CATARACT EXTRACTION Bilateral     COLONOSCOPY      CYSTOSCOPY      ENDOSCOPY      possible    ENDOSCOPY N/A 9/5/2024    Procedure: ESOPHAGOGASTRODUODENOSCOPY;  Surgeon: Anthony Arambula MD;  Location:  [facility];  Service: Gastroenterology;  Laterality:  N/A;  Esophagus dilated to 18mm with 12mm-mm to 15mm, then 15mm to 18mm balloon.    ENDOSCOPY N/A 10/31/2024    Procedure: ESOPHAGOGASTRODUODENOSCOPY WITH BIOPSY;  Surgeon: Carlos Dior MD;  Location:  [facility];  Service: Gastroenterology;  Laterality: N/A;    ERCP N/A 9/5/2024    Procedure: ENDOSCOPIC RETROGRADE CHOLANGIOPANCREATOGRAPHY;  Surgeon: Anthony Arambula MD;  Location:  [facility];  Service: Gastroenterology;  Laterality: N/A;  Sphincterotomy made to ampula of common bile duct (CBD), CBD swept with 9mm-12mm balloon - sludge, stones and purulent appearing drainage extracted. 10x7 Turkish biliary stent depolyed into CBD. ERCP scope removed with balloon intact.    ERCP N/A 10/31/2024    Procedure: ENDOSCOPIC RETROGRADE CHOLANGIOPANCREATOGRAPHY;  Surgeon: Carlos Dior MD;  Location:  [facility];  Service: Gastroenterology;  Laterality: N/A;    KIDNEY STONE SURGERY      x1       Family History: family history includes Alzheimer's disease in his mother; COPD in his father; Cancer in his father; Kidney disease in his father; Lung cancer in his father; No Known Problems in his daughter, daughter, and daughter. Otherwise pertinent FHx was reviewed and not pertinent to current issue.    Social History:  reports that he quit smoking about 64 years ago. His smoking use included cigarettes. He started smoking about 77 years ago. He has a 15 pack-year smoking history. He has been exposed to tobacco smoke. He quit smokeless tobacco use about 13 years ago.  His smokeless tobacco use included chew. He reports that he does not drink alcohol and does not use drugs.    Home Medications:  Coenzyme Q10, Iron, Probiotic Product, albuterol sulfate HFA, allopurinol, ascorbic acid, docusate sodium, donepezil, finasteride, furosemide, memantine, metFORMIN, methenamine, metoprolol tartrate, multivitamin with minerals, omeprazole, potassium chloride, pravastatin, sertraline, and  tiotropium bromide-olodaterol      Allergies:  Allergies   Allergen Reactions    Sglt2 Inhibitors Other (See Comments)     Recurrent UTI    Xarelto [Rivaroxaban] GI Bleeding     GI bleed    Penicillins Hives     Has tolerated cefepime, ceftriaxone, cefazolin, cefdinir, cefuroxime, cephalexin       Objective   Objective     Vitals:   Temp:  [97.7 °F (36.5 °C)-98 °F (36.7 °C)] 98 °F (36.7 °C)  Heart Rate:  [] 107  Resp:  [16-18] 18  BP: ()/(44-95) 117/79  Physical Exam    Constitutional: Awake in bed, alert    Eyes: PERRLA, sclerae anicteric, no conjunctival injection   HENT: Normocephalic, atraumatic, mucous membranes moist   Neck: Supple, trachea midline   Respiratory:Equal chest rise, non-labored respirations    Cardiovascular: RRR, palpable radial pulses bilaterally   Gastrointestinal: Soft, nontender, non-distended, no flank pain to palpation    Musculoskeletal: No bilateral ankle edema, no clubbing or cyanosis to extremities   Psychiatric: Appropriate affect, cooperative   Neurologic: Oriented x 3, Cranial Nerves grossly intact, speech clear   Skin: No rashes     Result Review    Result Review:  I have personally reviewed the results from the time of this admission to 12/5/2024 19:10 EST and agree with these findings:  [x]  Laboratory  [x]  Microbiology  [x]  Radiology  []  EKG/Telemetry   []  Cardiology/Vascular   []  Pathology  [x]  Old records  []  Other:    Most notable findings include: Creatinine 1.19    Assessment & Plan   Assessment / Plan     Brief Patient Summary:  Darien Camarena is a 89 y.o. male who has a history of chronic left-sided vesicoureteral reflux with severe hydroureteronephrosis and chronic left renal atrophy.  He has chronic urinary retention managed with intermittent catheterization.  Recently reported trouble performing CIC at home, nurse practitioner performed easy catheterization in the office.  He was reporting weakness fatigue and hypotension was noted and he was  sent to the ER.  He looks like he has a UTI.  I recommend indwelling Del Cid catheter and repeat a CT without contrast tomorrow to reassess his severe left-sided chronic appearing hydroureteronephrosis related to vesicoureteral reflux.  He may require left-sided nephrostomy tube for complete drainage of no improvement with infection or persistent hydronephrosis is noted.  Discussed with the daughter at the bedside this could be a source of infection or recurrent infection and attempt at retrograde placement of ureteral stent would be extraordinarily challenging given the multiple redundant ureteral loops noted on his CT imaging from September.    Active Hospital Problems:  Active Hospital Problems    Diagnosis     **Orthostatic hypotension     A-fib     Acute on chronic urinary retention     Heart failure with mildly reduced ejection fraction (HFmrEF)     Stage 3 chronic kidney disease     Essential hypertension      Plan:   -Antibiotics for UTI, follow-up cultures  -CT abdomen pelvis without contrast ordered for tomorrow  -I talked with nursing who will place an indwelling Del Cid catheter for maximal drainage we will see if this causes resolution of his chronic appearing left hydroureteronephrosis  -If persistent obstruction noted at the left renal unit this could be a source of recurrent infection I would recommend a left nephrostomy tube during this admission    VTE Prophylaxis:  Mechanical VTE prophylaxis orders are present.        CODE STATUS:       Admission Status:  I believe this patient meets inpatient status.    Electronically signed by Krystian Larkin MD, 12/05/24, 7:10 PM EST.               Electronically signed by Krystian Larkin MD at 12/05/24 1915

## 2024-12-06 NOTE — PROGRESS NOTES
[facility]   Urology Progress Note    Patient Name: Darien Camarena  : 1935  MRN: [id number]  Primary Care Physician:  Pito Way MD  Date of admission: 2024    Subjective   Subjective     Chief Complaint: UTI, difficult catheterization, urinary retention    HPI:  Patient Reports no significant issues overnight after catheter placement.  He has had some ongoing A-fib with RVR.  Cardiology is managing medication.  The patient underwent repeat CT scan while catheter has been in place to determine if left hydronephrosis has resolved.  The patient's hydronephrosis has completely resolved.  He has severe vesicoureteral reflux on the left.  There is air in the proximal dilated ureter which is likely secondary to Del Cid catheter placement and his existing reflux at this site.  He has been otherwise fairly stable.  He has no leukocytosis.  Has been afebrile.    Review of Systems   All systems were reviewed and negative except for: None    Objective   Objective     Vitals:   Temp:  [97.8 °F (36.6 °C)-98 °F (36.7 °C)] 97.8 °F (36.6 °C)  Heart Rate:  [] 110  Resp:  [16-22] 18  BP: ()/(60-94) 107/87  Physical Exam    Constitutional: Awake in bed, alert   Eyes: PERRLA, sclerae anicteric, no conjunctival injection   HENT: Normocephalic, atraumatic, mucous membranes moist   Neck: Supple, trachea midline   Respiratory: Equal chest rise, non-labored respirations    Cardiovascular: RRR, palpable radial pulses bilaterally   Gastrointestinal: Soft, nontender, non-distended   Genitourinary: circumcised phallus, orthotopic meatus, bilaterally descended testicles without masses, Del Cid catheter in place draining clear yellow urine   Musculoskeletal: No lower extremity edema bilaterally, no clubbing or cyanosis to extremities   Psychiatric: Appropriate affect, cooperative   Neurologic: Oriented x 3,  Cranial Nerves grossly intact, speech clear   Skin: No rashes     Result Review    Result  Review:  I have personally reviewed the results from the time of this admission to 12/6/2024 14:46 EST and agree with these findings:  [x]  Laboratory  [x]  Microbiology  [x]  Radiology  []  EKG/Telemetry   []  Cardiology/Vascular   []  Pathology  []  Old records  []  Other:    Most notable findings include: Urine culture growing gram-negative bacilli, creatinine stable 0.94, CT scan reviewed.  No left-sided hydronephrosis.  Air in the collecting system likely related to catheter placement.    Assessment & Plan   Assessment / Plan     Brief Patient Summary:  Darien Camarena is a 89 y.o. male who has a history of chronic retention managed with Del Cid catheterization.  He has had some trouble passing his catheter recently.  He was admitted from our office yesterday with UTI concerns and hypotension.  Now having atrial fibrillation with RVR concerns.  I asked for a repeat CT scan today which has been performed, this demonstrates left-sided severe hydronephrosis has resolved with Del Cid placement.  This is in line with his existing left-sided vesicoureteral reflux.  The left kidney appears to decompress very well with indwelling catheter.  For this reason we will likely suggest an indwelling catheter long-term versus a suprapubic catheter.  I do not see a role for nephrostomy tube placement or stent at this time given lack of hydronephrosis.  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  Given his severe VUR air introduction of the ureter not uncommon.  Follow-up cultures, tailor antibiotics.  Active Hospital Problems:  Active Hospital Problems    Diagnosis     **Orthostatic hypotension     A-fib     Acute on chronic urinary retention     Heart failure with mildly reduced ejection fraction (HFmrEF)     Stage 3 chronic kidney disease     Essential hypertension        Plan:   -Continue indwelling Del Cid  catheter  -Follow-up with me in urology office in 2 weeks, will discuss indwelling Del Cid versus transition to suprapubic catheter  -Given difficulty passing a catheter and possible development of a stricture I do not believe self-catheterization long-term would be a safe option for the patient any longer  -Follow-up urine culture, tailor antibiotics  -Urology to sign off    VTE Prophylaxis:  Mechanical VTE prophylaxis orders are present.        CODE STATUS:      Disposition:  I expect patient to remain admitted.    Electronically signed by Krystian Larkin MD, 12/06/24, 2:46 PM EST.

## 2024-12-07 PROBLEM — I48.91 A-FIB: Status: RESOLVED | Noted: 2024-12-05 | Resolved: 2024-12-07

## 2024-12-07 LAB
ALBUMIN SERPL-MCNC: 2.8 G/DL (ref 3.5–5.2)
ALBUMIN/GLOB SERPL: 0.9 G/DL
ALP SERPL-CCNC: 166 U/L (ref 39–117)
ALT SERPL W P-5'-P-CCNC: 12 U/L (ref 1–41)
ANION GAP SERPL CALCULATED.3IONS-SCNC: 9 MMOL/L (ref 5–15)
AST SERPL-CCNC: 25 U/L (ref 1–40)
BACTERIA SPEC AEROBE CULT: ABNORMAL
BACTERIA SPEC AEROBE CULT: ABNORMAL
BASOPHILS # BLD AUTO: 0.05 10*3/MM3 (ref 0–0.2)
BASOPHILS NFR BLD AUTO: 0.8 % (ref 0–1.5)
BILIRUB SERPL-MCNC: 0.7 MG/DL (ref 0–1.2)
BUN SERPL-MCNC: 21 MG/DL (ref 8–23)
BUN/CREAT SERPL: 31.8 (ref 7–25)
CALCIUM SPEC-SCNC: 8.6 MG/DL (ref 8.6–10.5)
CHLORIDE SERPL-SCNC: 105 MMOL/L (ref 98–107)
CO2 SERPL-SCNC: 27 MMOL/L (ref 22–29)
CREAT SERPL-MCNC: 0.66 MG/DL (ref 0.76–1.27)
DEPRECATED RDW RBC AUTO: 52.6 FL (ref 37–54)
EGFRCR SERPLBLD CKD-EPI 2021: 90.2 ML/MIN/1.73
EOSINOPHIL # BLD AUTO: 0.26 10*3/MM3 (ref 0–0.4)
EOSINOPHIL NFR BLD AUTO: 4.2 % (ref 0.3–6.2)
ERYTHROCYTE [DISTWIDTH] IN BLOOD BY AUTOMATED COUNT: 14.6 % (ref 12.3–15.4)
GLOBULIN UR ELPH-MCNC: 3.1 GM/DL
GLUCOSE BLDC GLUCOMTR-MCNC: 104 MG/DL (ref 70–130)
GLUCOSE BLDC GLUCOMTR-MCNC: 108 MG/DL (ref 70–130)
GLUCOSE BLDC GLUCOMTR-MCNC: 122 MG/DL (ref 70–130)
GLUCOSE BLDC GLUCOMTR-MCNC: 239 MG/DL (ref 70–130)
GLUCOSE SERPL-MCNC: 105 MG/DL (ref 65–99)
HCT VFR BLD AUTO: 35.3 % (ref 37.5–51)
HGB BLD-MCNC: 11.3 G/DL (ref 13–17.7)
IMM GRANULOCYTES # BLD AUTO: 0.01 10*3/MM3 (ref 0–0.05)
IMM GRANULOCYTES NFR BLD AUTO: 0.2 % (ref 0–0.5)
LYMPHOCYTES # BLD AUTO: 1.29 10*3/MM3 (ref 0.7–3.1)
LYMPHOCYTES NFR BLD AUTO: 21 % (ref 19.6–45.3)
MAGNESIUM SERPL-MCNC: 1.7 MG/DL (ref 1.6–2.4)
MCH RBC QN AUTO: 31.5 PG (ref 26.6–33)
MCHC RBC AUTO-ENTMCNC: 32 G/DL (ref 31.5–35.7)
MCV RBC AUTO: 98.3 FL (ref 79–97)
MONOCYTES # BLD AUTO: 0.39 10*3/MM3 (ref 0.1–0.9)
MONOCYTES NFR BLD AUTO: 6.3 % (ref 5–12)
NEUTROPHILS NFR BLD AUTO: 4.15 10*3/MM3 (ref 1.7–7)
NEUTROPHILS NFR BLD AUTO: 67.5 % (ref 42.7–76)
NRBC BLD AUTO-RTO: 0 /100 WBC (ref 0–0.2)
PHOSPHATE SERPL-MCNC: 3.1 MG/DL (ref 2.5–4.5)
PLATELET # BLD AUTO: 149 10*3/MM3 (ref 140–450)
PMV BLD AUTO: 11.1 FL (ref 6–12)
POTASSIUM SERPL-SCNC: 3.9 MMOL/L (ref 3.5–5.2)
PROT SERPL-MCNC: 5.9 G/DL (ref 6–8.5)
RBC # BLD AUTO: 3.59 10*6/MM3 (ref 4.14–5.8)
SODIUM SERPL-SCNC: 141 MMOL/L (ref 136–145)
WBC NRBC COR # BLD AUTO: 6.15 10*3/MM3 (ref 3.4–10.8)

## 2024-12-07 PROCEDURE — 63710000001 INSULIN LISPRO (HUMAN) PER 5 UNITS: Performed by: INTERNAL MEDICINE

## 2024-12-07 PROCEDURE — 82948 REAGENT STRIP/BLOOD GLUCOSE: CPT

## 2024-12-07 PROCEDURE — 99232 SBSQ HOSP IP/OBS MODERATE 35: CPT

## 2024-12-07 PROCEDURE — 83735 ASSAY OF MAGNESIUM: CPT | Performed by: INTERNAL MEDICINE

## 2024-12-07 PROCEDURE — 99232 SBSQ HOSP IP/OBS MODERATE 35: CPT | Performed by: INTERNAL MEDICINE

## 2024-12-07 PROCEDURE — 84100 ASSAY OF PHOSPHORUS: CPT | Performed by: INTERNAL MEDICINE

## 2024-12-07 PROCEDURE — 85025 COMPLETE CBC W/AUTO DIFF WBC: CPT | Performed by: INTERNAL MEDICINE

## 2024-12-07 PROCEDURE — 25010000002 ERTAPENEM PER 500 MG: Performed by: INTERNAL MEDICINE

## 2024-12-07 PROCEDURE — 80053 COMPREHEN METABOLIC PANEL: CPT | Performed by: INTERNAL MEDICINE

## 2024-12-07 RX ORDER — ASPIRIN 81 MG/1
81 TABLET ORAL DAILY
Status: DISCONTINUED | OUTPATIENT
Start: 2024-12-07 | End: 2024-12-18 | Stop reason: HOSPADM

## 2024-12-07 RX ADMIN — FLUTICASONE PROPIONATE 2 SPRAY: 50 SPRAY, METERED NASAL at 10:01

## 2024-12-07 RX ADMIN — Medication 10 ML: at 10:02

## 2024-12-07 RX ADMIN — AMIODARONE HYDROCHLORIDE 200 MG: 200 TABLET ORAL at 10:00

## 2024-12-07 RX ADMIN — Medication 10 ML: at 21:18

## 2024-12-07 RX ADMIN — PRAVASTATIN SODIUM 40 MG: 40 TABLET ORAL at 10:00

## 2024-12-07 RX ADMIN — SERTRALINE HYDROCHLORIDE 50 MG: 50 TABLET ORAL at 10:00

## 2024-12-07 RX ADMIN — DONEPEZIL HYDROCHLORIDE 10 MG: 10 TABLET, FILM COATED ORAL at 21:18

## 2024-12-07 RX ADMIN — Medication 12.5 MG: at 12:48

## 2024-12-07 RX ADMIN — ASPIRIN 81 MG: 81 TABLET, COATED ORAL at 12:48

## 2024-12-07 RX ADMIN — AMIODARONE HYDROCHLORIDE 200 MG: 200 TABLET ORAL at 21:18

## 2024-12-07 RX ADMIN — Medication 12.5 MG: at 21:18

## 2024-12-07 RX ADMIN — ERTAPENEM 1000 MG: 1 INJECTION INTRAMUSCULAR; INTRAVENOUS at 10:00

## 2024-12-07 RX ADMIN — INSULIN LISPRO 3 UNITS: 100 INJECTION, SOLUTION INTRAVENOUS; SUBCUTANEOUS at 12:48

## 2024-12-07 RX ADMIN — FINASTERIDE 5 MG: 5 TABLET, FILM COATED ORAL at 10:00

## 2024-12-07 RX ADMIN — MEMANTINE 10 MG: 10 TABLET ORAL at 21:18

## 2024-12-07 RX ADMIN — MEMANTINE 10 MG: 10 TABLET ORAL at 10:00

## 2024-12-07 RX ADMIN — PANTOPRAZOLE SODIUM 40 MG: 40 TABLET, DELAYED RELEASE ORAL at 05:50

## 2024-12-07 NOTE — PROGRESS NOTES
[facility]   Urology Progress Note    Patient Name: Darien Camarena  : 1935  MRN: [id number]  Primary Care Physician:  Pito Way MD  Date of admission: 2024    Subjective   Subjective     Chief Complaint:  UTI, difficult catheterization, urinary retention     HPI:  Patient reports no significant issues overnight. Patient sitting up in chair eating breakfast. Denies pain or discomfort. Indwelling rahman catheter in place draining clear/yellow output.     Review of Systems   All systems were reviewed and negative except for: none    Objective   Objective     Vitals:   Temp:  [97.5 °F (36.4 °C)-98.1 °F (36.7 °C)] 97.5 °F (36.4 °C)  Heart Rate:  [113-133] 133  Resp:  [16-20] 18  BP: ()/(69-99) 100/85  Physical Exam    Constitutional: Awake in bed, alert   Eyes: PERRLA, sclerae anicteric, no conjunctival injection   HENT: Normocephalic, atraumatic, mucous membranes moist   Neck: Supple, trachea midline   Respiratory: Equal chest rise   Cardiovascular: RRR   Gastrointestinal: Soft, nontender, non-distended   Genitourinary: Rahman catheter in place draining clear yellow urine    Musculoskeletal: No lower extremity edema bilaterally   Psychiatric: Appropriate affect, cooperative   Neurologic: Oriented x 3,  Cranial Nerves grossly intact, speech clear   Skin: No rashes     Result Review    Result Review:  I have personally reviewed the results from the time of this admission to 2024 10:31 EST and agree with these findings:  [x]  Laboratory  []  Microbiology  []  Radiology  []  EKG/Telemetry   []  Cardiology/Vascular   []  Pathology  []  Old records  [x]  Other:Vital signs    Most notable findings include: creatinine 0.66, WBC 6.15, H&H 11.3, 35.3    Assessment & Plan   Assessment / Plan     Brief Patient Summary:  Darien Camarena is a 89 y.o. male who has a history of chronic retention managed with Rahman catheterization. Patient is scheduled for a 2 week follow up with   Trae to discuss indwelling catheter long-term versus a suprapubic catheter. Patient and daughters are understanding and agreeable with plan of care.     Active Hospital Problems:  Active Hospital Problems    Diagnosis     **Orthostatic hypotension     Acute on chronic urinary retention     Heart failure with mildly reduced ejection fraction (HFmrEF)     Stage 3 chronic kidney disease     Permanent atrial fibrillation     Essential hypertension        Plan:     -Continue indwelling rahman catheter.  -Follow up with Dr. Larkin in 2 weeks, discuss indwelling rahman catheter vs. transition to suprapubic catheter.   -Urology will sign off. Please reach out with any questions or concerns.   -Discussed with Dr. Yeh.         VTE Prophylaxis:  Mechanical VTE prophylaxis orders are present.        CODE STATUS:      Disposition:  I expect patient to remain inpatient.    Electronically signed by BERYL Larkin, 12/07/24, 10:22 AM EST.

## 2024-12-07 NOTE — PLAN OF CARE
Goal Outcome Evaluation:  Plan of Care Reviewed With: patient        Progress: no change  Outcome Evaluation: VSS. Pt remains on RA with no signs of SOA. Pt remains a. fib on the monitor. No pain noted. Metoprolol restarted.

## 2024-12-07 NOTE — PROGRESS NOTES
[facility] Medicine Services  PROGRESS NOTE    Patient Name: Darien Camarena  : 1935  MRN: [id number]    Date of Admission: 2024  Primary Care Physician: Pito Way MD    Subjective   Subjective     CC:  Follow-up for weakness    HPI:  Patient seen and examined this morning.  He is feeling much better today.  Able to get up and walk with no dizziness or lightheadedness.  Explained to him that we are waiting on the results of his urine culture prior to discharging home.  And he will likely need to go home with a Del Cid catheter  per urology recs       Objective   Objective     Vital Signs:   Temp:  [97.5 °F (36.4 °C)-98.1 °F (36.7 °C)] 97.5 °F (36.4 °C)  Heart Rate:  [113-133] 129  Resp:  [16-20] 16  BP: ()/(69-99) 122/85     Physical Exam:  General: Comfortable, not in distress, conversant and cooperative  Head: Atraumatic and normocephalic  Eyes: No Icterus. No pallor  Ears:  Ears appear intact with no abnormalities noted  Throat: No oral lesions, no thrush  Neck: Supple, trachea midline  Lungs: Clear to auscultation bilaterally, equal air entry, no wheezing or crackles  Heart:  Normal S1 and S2, no murmur, no gallop, No JVD, no lower extremity swelling  Abdomen:  Soft, no tenderness, no organomegaly, normal bowel sounds, no organomegaly  Extremities: pulses equal bilaterally  Skin: No bleeding, bruising or rash, normal skin turgor and elasticity  Neurologic: Cranial nerves appear intact with no evidence of facial asymmetry, normal motor and sensory functions in all 4 extremities  Psych: Alert and oriented x 3, normal mood    Results Reviewed:  LAB RESULTS:      Lab 24  0430 24  1350 24  1034   WBC 6.15  --  9.69   HEMOGLOBIN 11.3*  --  13.6   HEMATOCRIT 35.3*  --  43.7   PLATELETS 149  --  252   NEUTROS ABS 4.15  --  7.39*   IMMATURE GRANS (ABS) 0.01  --  0.03   LYMPHS ABS 1.29  --  1.47   MONOS ABS 0.39  --  0.48   EOS ABS 0.26  --  0.25    MCV 98.3*  --  100.0*   PROCALCITONIN  --   --  0.05   LACTATE  --  2.9* 3.0*   HSTROP T  --   --  36*         Lab 12/07/24  0430 12/06/24  0835 12/05/24  1034   SODIUM 141 138 142   POTASSIUM 3.9 4.4 5.0   CHLORIDE 105 105 100   CO2 27.0 27.0 29.0   ANION GAP 9.0 6.0 13.0   BUN 21 31* 40*   CREATININE 0.66* 0.94 1.19   EGFR 90.2 78.0 58.8*   GLUCOSE 105* 119* 106*   CALCIUM 8.6 8.6 9.7   MAGNESIUM 1.7  --  1.7   PHOSPHORUS 3.1  --   --          Lab 12/07/24  0430 12/05/24  1034   TOTAL PROTEIN 5.9* 7.7   ALBUMIN 2.8* 3.5   GLOBULIN 3.1 4.2   ALT (SGPT) 12 18   AST (SGOT) 25 41*   BILIRUBIN 0.7 0.9   ALK PHOS 166* 221*         Lab 12/05/24  1034   HSTROP T 36*                 Brief Urine Lab Results  (Last result in the past 365 days)        Color   Clarity   Blood   Leuk Est   Nitrite   Protein   CREAT   Urine HCG        12/05/24 1432 Yellow   Clear   Negative   Moderate (2+)   Positive   Negative                   Microbiology Results Abnormal       Procedure Component Value - Date/Time    Urine Culture - Urine, Urine, Catheter [[id number]]  (Abnormal) Collected: 12/05/24 1432    Lab Status: Final result Specimen: Urine, Catheter Updated: 12/07/24 1028     Urine Culture >100,000 CFU/mL Escherichia coli    Narrative:      Refer to previous urine culture collected on 12/05/2024 1610 for MICs    Colonization of the urinary tract without infection is common. Treatment is discouraged unless the patient is symptomatic, pregnant, or undergoing an invasive urologic procedure.            CT Abdomen Pelvis Without Contrast    Result Date: 12/6/2024  CT ABDOMEN PELVIS WO CONTRAST Date of Exam: 12/6/2024 10:43 AM EST Indication: Chronic urinary retention admitted with UTI, hx of severe left hydroureteronephrosis, reassess anatomy. Comparison: CT of the abdomen and pelvis dated 9//2024 Technique: Axial CT images were obtained of the abdomen and pelvis without the administration of contrast. Reconstructed coronal and sagittal  images were also obtained. Automated exposure control and iterative construction methods were used. Findings: Liver: The liver is unremarkable in morphology. Evaluation for focal liver lesions is limited without IV contrast. No biliary dilation is seen. There appears to be trace pneumobilia within left hepatic ducts. Gallbladder: Unremarkable. Pancreas: Unremarkable. Spleen: Unremarkable. Adrenal glands: Unremarkable. Genitourinary tract: Several punctate nonobstructing calculi within the left kidney. Small left renal cysts. Left renal cortical thinning/scarring. Right kidney appears unremarkable. No hydronephrosis is seen. There is an indwelling Del Cid catheter. There  is air within the urinary bladder and left urinary tract. Right ureter is unremarkable. Prostate gland is unremarkable. Gastrointestinal tract: Limited evaluation of the hollow viscera due to lack of IV contrast administration. There is no evidence of bowel obstruction. Appendix: The appendix is not identified. Other findings: No free air or free fluid. No pathologically enlarged lymph nodes are seen. Vascular calcifications are present. Bones and soft tissues: No acute osseous lesion is identified. Bones are demineralized. There are degenerative changes within the spine. Superficial soft tissues demonstrate no acute abnormality. Lung bases: Small bilateral pleural effusions, left greater than right. Bibasilar atelectasis.     Impression: Impression: 1.No acute abnormality identified within the abdomen or pelvis. 2.Indwelling Del Cid catheter and air noted within the urinary bladder and left urinary tract. Please correlate with urinalysis to exclude urinary tract infection. 3.Several punctate nonobstructing calculi within the left kidney. 4.Small bilateral pleural effusions with bibasilar atelectasis. 5.Additional findings as detailed above. Electronically Signed: Andrea Lopez MD  12/6/2024 12:23 PM EST  Workstation ID: [id number]     Results for  orders placed during the hospital encounter of 11/21/24    Adult Transesophageal Echo 3D (FARHAD) W/ Cont If Necessary Per Protocol    Interpretation Summary    There is a 27mm Watchman FLX device in place. It appears well seated and well compressed with no device related thrombus seen. There is a small jet of color flow, 2.5 mm, at the superior aspect adjacent to the left upper pulmonary vein. This was not seen previous examination however image quality is improved compared to prior.    Left ventricular systolic function is moderately decreased. Left ventricular ejection fraction appears to be 36 - 40%. Normal left ventricular wall thickness noted. The left ventricular cavity is dilated. There is left ventricular global hypokinesis noted.    : The right ventricular cavity is mildly dilated. Mildly reduced right ventricular systolic function noted.    : The left atrial cavity is severely dilated. No evidence of a left atrial thrombus present. There is light spontaneous echo contrast present in the atrial body.    The right atrial cavity is severely dilated.    There is mild, bileaflet mitral valve thickening present. Mild mitral valve regurgitation is present. No significant mitral valve stenosis is present.    Mild tricuspid valve regurgitation is present. Estimated right ventricular systolic pressure from tricuspid regurgitation is mildly elevated (35-45 mmHg).    There is moderate pulmonic valve regurgitation present.      Current medications:  Scheduled Meds:amiodarone, 200 mg, Oral, Q12H  aspirin, 81 mg, Oral, Daily  donepezil, 10 mg, Oral, Nightly  ertapenem, 1,000 mg, Intravenous, Q24H  finasteride, 5 mg, Oral, Daily  fluticasone, 2 spray, Each Nare, BID  insulin lispro, 2-7 Units, Subcutaneous, 4x Daily AC & at Bedtime  memantine, 10 mg, Oral, BID  metoprolol tartrate, 12.5 mg, Oral, Q8H  pantoprazole, 40 mg, Oral, Q AM  pravastatin, 40 mg, Oral, Daily  sertraline, 50 mg, Oral, Daily  sodium chloride, 10 mL,  Intravenous, Q12H      Continuous Infusions:     PRN Meds:.  Albuterol Sulfate NEB Orderable    senna-docusate sodium **AND** polyethylene glycol **AND** bisacodyl **AND** bisacodyl    Calcium Replacement - Follow Nurse / BPA Driven Protocol    dextrose    dextrose    glucagon (human recombinant)    Magnesium Standard Dose Replacement - Follow Nurse / BPA Driven Protocol    ondansetron    Phosphorus Replacement - Follow Nurse / BPA Driven Protocol    Potassium Replacement - Follow Nurse / BPA Driven Protocol    sodium chloride    sodium chloride    sodium chloride    Assessment & Plan   Assessment & Plan     Active Hospital Problems    Diagnosis  POA    **Orthostatic hypotension [I95.1]  Yes    Acute on chronic urinary retention [R33.9]  Yes    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 male with history of chronic atrial fibrillation s/p watchman, CKD stage III, chronic urinary retention with self caths, HFmr EF, type 2 diabetes, hypertension hyperlipidemia.  Patient sent from urology clinic with complaints of dizziness and orthostatic hypotension    Acute UTI, POA  Chronic urine retention with severe left hydronephrosis, improved  Patient with known history of chronic retention.  He self cath 4 times a day.  Have been having difficulty self cathing likely secondary to developing urethral stricture  Seen at urology clinic and sent to the hospital because of hypotension  UA is concerning for UTI.  Started on IV Rocephin.  Urine culture growing gram-negative bacilli.  Awaiting speciation particularly with his previous history of ESBL   Urology following  Had a Del Cid catheter placed per  urology recs.  Repeat CT abdomen after Del Cid catheter placement showed complete resolution of the severe left hydronephrosis.  Dr. Larkin /urology recommended to discharge the patient on Del Cid catheter and follow-up with him in the clinic in 2 weeks    Orthostatic hypotension, resolved  Likely secondary to poor oral intake over the last few days   Improved with IV fluids  Lasix held    HFmrEF, compensated  Paroxysmal A-fib  Echo from 11/21 with EF 35-to 40%  Recently had a recent increase in Lasix due to lower extremity edema.  Lasix currently on hold because of hypotension  Started on amiodarone for rate control  Not on anticoagulation.  Status post Watchman device  Appreciate help from cardiology team    CKD stage III  Creatinine stable at baseline.  Continue to monitor     Well-controlled type 2 diabetes   A1c 5.9%  Continue SSI      Dementia and depression  Continue Aricept and Namenda  Continue Zoloft       Expected Discharge Location and Transportation: Home  Expected Discharge   Expected Discharge Date: 12/8/2024; Expected Discharge Time:      VTE Prophylaxis:  Mechanical VTE prophylaxis orders are present.         AM-PAC 6 Clicks Score (PT): 20 (12/06/24 2050)    CODE STATUS:   There are no questions and answers to display.       Clarence Samaniego MD  12/07/24

## 2024-12-07 NOTE — PLAN OF CARE
Goal Outcome Evaluation:  Plan of Care Reviewed With: patient        Progress: no change  Outcome Evaluation: Patient rested this shift. Patient remains in A. Fib with -125 bpm. No verbalized complaints this shift. UOP 750ml this shift.

## 2024-12-08 LAB
ALBUMIN SERPL-MCNC: 2.7 G/DL (ref 3.5–5.2)
ALBUMIN/GLOB SERPL: 0.9 G/DL
ALP SERPL-CCNC: 167 U/L (ref 39–117)
ALT SERPL W P-5'-P-CCNC: 15 U/L (ref 1–41)
ANION GAP SERPL CALCULATED.3IONS-SCNC: 5 MMOL/L (ref 5–15)
AST SERPL-CCNC: 33 U/L (ref 1–40)
BASOPHILS # BLD AUTO: 0.05 10*3/MM3 (ref 0–0.2)
BASOPHILS NFR BLD AUTO: 0.7 % (ref 0–1.5)
BILIRUB SERPL-MCNC: 0.5 MG/DL (ref 0–1.2)
BUN SERPL-MCNC: 21 MG/DL (ref 8–23)
BUN/CREAT SERPL: 30.9 (ref 7–25)
CALCIUM SPEC-SCNC: 8.5 MG/DL (ref 8.6–10.5)
CHLORIDE SERPL-SCNC: 106 MMOL/L (ref 98–107)
CO2 SERPL-SCNC: 27 MMOL/L (ref 22–29)
CREAT SERPL-MCNC: 0.68 MG/DL (ref 0.76–1.27)
DEPRECATED RDW RBC AUTO: 53.7 FL (ref 37–54)
EGFRCR SERPLBLD CKD-EPI 2021: 89.4 ML/MIN/1.73
EOSINOPHIL # BLD AUTO: 0.39 10*3/MM3 (ref 0–0.4)
EOSINOPHIL NFR BLD AUTO: 5.7 % (ref 0.3–6.2)
ERYTHROCYTE [DISTWIDTH] IN BLOOD BY AUTOMATED COUNT: 14.8 % (ref 12.3–15.4)
GLOBULIN UR ELPH-MCNC: 3 GM/DL
GLUCOSE BLDC GLUCOMTR-MCNC: 112 MG/DL (ref 70–130)
GLUCOSE BLDC GLUCOMTR-MCNC: 112 MG/DL (ref 70–130)
GLUCOSE BLDC GLUCOMTR-MCNC: 121 MG/DL (ref 70–130)
GLUCOSE BLDC GLUCOMTR-MCNC: 96 MG/DL (ref 70–130)
GLUCOSE SERPL-MCNC: 124 MG/DL (ref 65–99)
HCT VFR BLD AUTO: 36.6 % (ref 37.5–51)
HGB BLD-MCNC: 11.3 G/DL (ref 13–17.7)
IMM GRANULOCYTES # BLD AUTO: 0.02 10*3/MM3 (ref 0–0.05)
IMM GRANULOCYTES NFR BLD AUTO: 0.3 % (ref 0–0.5)
LYMPHOCYTES # BLD AUTO: 1.32 10*3/MM3 (ref 0.7–3.1)
LYMPHOCYTES NFR BLD AUTO: 19.3 % (ref 19.6–45.3)
MCH RBC QN AUTO: 30.6 PG (ref 26.6–33)
MCHC RBC AUTO-ENTMCNC: 30.9 G/DL (ref 31.5–35.7)
MCV RBC AUTO: 99.2 FL (ref 79–97)
MONOCYTES # BLD AUTO: 0.45 10*3/MM3 (ref 0.1–0.9)
MONOCYTES NFR BLD AUTO: 6.6 % (ref 5–12)
NEUTROPHILS NFR BLD AUTO: 4.6 10*3/MM3 (ref 1.7–7)
NEUTROPHILS NFR BLD AUTO: 67.4 % (ref 42.7–76)
NRBC BLD AUTO-RTO: 0 /100 WBC (ref 0–0.2)
PLATELET # BLD AUTO: 154 10*3/MM3 (ref 140–450)
PMV BLD AUTO: 10.8 FL (ref 6–12)
POTASSIUM SERPL-SCNC: 4.2 MMOL/L (ref 3.5–5.2)
PROT SERPL-MCNC: 5.7 G/DL (ref 6–8.5)
RBC # BLD AUTO: 3.69 10*6/MM3 (ref 4.14–5.8)
SODIUM SERPL-SCNC: 138 MMOL/L (ref 136–145)
WBC NRBC COR # BLD AUTO: 6.83 10*3/MM3 (ref 3.4–10.8)

## 2024-12-08 PROCEDURE — 85025 COMPLETE CBC W/AUTO DIFF WBC: CPT | Performed by: INTERNAL MEDICINE

## 2024-12-08 PROCEDURE — 99232 SBSQ HOSP IP/OBS MODERATE 35: CPT

## 2024-12-08 PROCEDURE — 82948 REAGENT STRIP/BLOOD GLUCOSE: CPT

## 2024-12-08 PROCEDURE — 99232 SBSQ HOSP IP/OBS MODERATE 35: CPT | Performed by: NURSE PRACTITIONER

## 2024-12-08 PROCEDURE — 25010000002 CEFTRIAXONE PER 250 MG: Performed by: INTERNAL MEDICINE

## 2024-12-08 PROCEDURE — 80053 COMPREHEN METABOLIC PANEL: CPT | Performed by: INTERNAL MEDICINE

## 2024-12-08 RX ORDER — ASPIRIN 81 MG/1
81 TABLET ORAL DAILY
Qty: 30 TABLET | Refills: 0 | Status: SHIPPED | OUTPATIENT
Start: 2024-12-09 | End: 2024-12-18 | Stop reason: HOSPADM

## 2024-12-08 RX ORDER — METOPROLOL TARTRATE 50 MG
50 TABLET ORAL EVERY 12 HOURS SCHEDULED
Status: DISCONTINUED | OUTPATIENT
Start: 2024-12-08 | End: 2024-12-18 | Stop reason: HOSPADM

## 2024-12-08 RX ORDER — CEFUROXIME AXETIL 500 MG/1
500 TABLET ORAL 2 TIMES DAILY
Qty: 8 TABLET | Refills: 0 | Status: SHIPPED | OUTPATIENT
Start: 2024-12-09 | End: 2024-12-18 | Stop reason: HOSPADM

## 2024-12-08 RX ORDER — METOPROLOL TARTRATE 50 MG
50 TABLET ORAL EVERY 12 HOURS SCHEDULED
Qty: 120 TABLET | Refills: 5 | Status: SHIPPED | OUTPATIENT
Start: 2024-12-08

## 2024-12-08 RX ORDER — METOPROLOL TARTRATE 25 MG/1
25 TABLET, FILM COATED ORAL EVERY 12 HOURS SCHEDULED
Status: DISCONTINUED | OUTPATIENT
Start: 2024-12-08 | End: 2024-12-08

## 2024-12-08 RX ADMIN — FINASTERIDE 5 MG: 5 TABLET, FILM COATED ORAL at 09:10

## 2024-12-08 RX ADMIN — AMIODARONE HYDROCHLORIDE 200 MG: 200 TABLET ORAL at 09:10

## 2024-12-08 RX ADMIN — FLUTICASONE PROPIONATE 2 SPRAY: 50 SPRAY, METERED NASAL at 09:10

## 2024-12-08 RX ADMIN — PRAVASTATIN SODIUM 40 MG: 40 TABLET ORAL at 09:10

## 2024-12-08 RX ADMIN — SENNOSIDES AND DOCUSATE SODIUM 2 TABLET: 50; 8.6 TABLET ORAL at 09:14

## 2024-12-08 RX ADMIN — FLUTICASONE PROPIONATE 2 SPRAY: 50 SPRAY, METERED NASAL at 21:27

## 2024-12-08 RX ADMIN — MEMANTINE 10 MG: 10 TABLET ORAL at 09:10

## 2024-12-08 RX ADMIN — MEMANTINE 10 MG: 10 TABLET ORAL at 21:25

## 2024-12-08 RX ADMIN — ASPIRIN 81 MG: 81 TABLET, COATED ORAL at 09:10

## 2024-12-08 RX ADMIN — PANTOPRAZOLE SODIUM 40 MG: 40 TABLET, DELAYED RELEASE ORAL at 05:51

## 2024-12-08 RX ADMIN — Medication 10 ML: at 09:10

## 2024-12-08 RX ADMIN — SODIUM CHLORIDE 2000 MG: 900 INJECTION INTRAVENOUS at 09:10

## 2024-12-08 RX ADMIN — METOPROLOL TARTRATE 50 MG: 50 TABLET, FILM COATED ORAL at 21:25

## 2024-12-08 RX ADMIN — Medication 12.5 MG: at 05:51

## 2024-12-08 RX ADMIN — SERTRALINE HYDROCHLORIDE 50 MG: 50 TABLET ORAL at 09:10

## 2024-12-08 RX ADMIN — DONEPEZIL HYDROCHLORIDE 10 MG: 10 TABLET, FILM COATED ORAL at 21:25

## 2024-12-08 NOTE — PLAN OF CARE
Goal Outcome Evaluation:  Plan of Care Reviewed With: patient        Progress: no change  Outcome Evaluation: Patient rested this shift. No complaints verbalized. Del Cid catheter in place. Patient remains in A. Fib with rate varied 100-120. Non symptomatic with HR

## 2024-12-08 NOTE — PROGRESS NOTES
[facility] Medicine Services  PROGRESS NOTE    Patient Name: Darien Camarena  : 1935  MRN: [id number]    Date of Admission: 2024  Primary Care Physician: Pito Way MD    Subjective   Subjective     CC:  Follow-up weakness, A-fib    HPI:  Patient was seen resting in bed no apparent distress.  No acute events overnight per nursing.  Remains in A-fib with heart rates in the 120s this morning.  He is currently feeling well and denies any new complaints at this time.  Currently awaiting final cardiology recommendations.    Objective   Objective     Vital Signs:   Temp:  [97.5 °F (36.4 °C)-98.2 °F (36.8 °C)] 97.6 °F (36.4 °C)  Heart Rate:  [111-129] 111  Resp:  [16-20] 18  BP: (110-129)/(76-95) 129/95  Flow (L/min) (Oxygen Therapy):  [2] 2     Physical Exam:  Constitutional: No acute distress, awake, alert, chronically ill-appearing  HENT: NCAT, mucous membranes moist  Respiratory: Grossly clear, diminished in bases, respiratory effort normal on 2 L  Cardiovascular: Irregularly irregular, tachycardic, no murmurs, cap refill brisk   Gastrointestinal: Positive bowel sounds, soft, nontender, nondistended  Musculoskeletal: No BLE edema   Psychiatric: Appropriate affect, cooperative  Neurologic: Oriented x 3, moves all extremities, speech clear  Skin: warm, dry, no visible rash     Results Reviewed:  LAB RESULTS:      Lab 24  0439 24  0430 24  1350 24  1034   WBC 6.83 6.15  --  9.69   HEMOGLOBIN 11.3* 11.3*  --  13.6   HEMATOCRIT 36.6* 35.3*  --  43.7   PLATELETS 154 149  --  252   NEUTROS ABS 4.60 4.15  --  7.39*   IMMATURE GRANS (ABS) 0.02 0.01  --  0.03   LYMPHS ABS 1.32 1.29  --  1.47   MONOS ABS 0.45 0.39  --  0.48   EOS ABS 0.39 0.26  --  0.25   MCV 99.2* 98.3*  --  100.0*   PROCALCITONIN  --   --   --  0.05   LACTATE  --   --  2.9* 3.0*   HSTROP T  --   --   --  36*         Lab 24  0439 24  0430 24  0835 24  1034    SODIUM 138 141 138 142   POTASSIUM 4.2 3.9 4.4 5.0   CHLORIDE 106 105 105 100   CO2 27.0 27.0 27.0 29.0   ANION GAP 5.0 9.0 6.0 13.0   BUN 21 21 31* 40*   CREATININE 0.68* 0.66* 0.94 1.19   EGFR 89.4 90.2 78.0 58.8*   GLUCOSE 124* 105* 119* 106*   CALCIUM 8.5* 8.6 8.6 9.7   MAGNESIUM  --  1.7  --  1.7   PHOSPHORUS  --  3.1  --   --          Lab 12/08/24  0439 12/07/24  0430 12/05/24  1034   TOTAL PROTEIN 5.7* 5.9* 7.7   ALBUMIN 2.7* 2.8* 3.5   GLOBULIN 3.0 3.1 4.2   ALT (SGPT) 15 12 18   AST (SGOT) 33 25 41*   BILIRUBIN 0.5 0.7 0.9   ALK PHOS 167* 166* 221*         Lab 12/05/24  1034   HSTROP T 36*                 Brief Urine Lab Results  (Last result in the past 365 days)        Color   Clarity   Blood   Leuk Est   Nitrite   Protein   CREAT   Urine HCG        12/05/24 1432 Yellow   Clear   Negative   Moderate (2+)   Positive   Negative                   Microbiology Results Abnormal       Procedure Component Value - Date/Time    Urine Culture - Urine, Urine, Catheter [[id number]]  (Abnormal) Collected: 12/05/24 1432    Lab Status: Final result Specimen: Urine, Catheter Updated: 12/07/24 1028     Urine Culture >100,000 CFU/mL Escherichia coli    Narrative:      Refer to previous urine culture collected on 12/05/2024 1610 for MICs    Colonization of the urinary tract without infection is common. Treatment is discouraged unless the patient is symptomatic, pregnant, or undergoing an invasive urologic procedure.            CT Abdomen Pelvis Without Contrast    Result Date: 12/6/2024  CT ABDOMEN PELVIS WO CONTRAST Date of Exam: 12/6/2024 10:43 AM EST Indication: Chronic urinary retention admitted with UTI, hx of severe left hydroureteronephrosis, reassess anatomy. Comparison: CT of the abdomen and pelvis dated 9//2024 Technique: Axial CT images were obtained of the abdomen and pelvis without the administration of contrast. Reconstructed coronal and sagittal images were also obtained. Automated exposure control and  iterative construction methods were used. Findings: Liver: The liver is unremarkable in morphology. Evaluation for focal liver lesions is limited without IV contrast. No biliary dilation is seen. There appears to be trace pneumobilia within left hepatic ducts. Gallbladder: Unremarkable. Pancreas: Unremarkable. Spleen: Unremarkable. Adrenal glands: Unremarkable. Genitourinary tract: Several punctate nonobstructing calculi within the left kidney. Small left renal cysts. Left renal cortical thinning/scarring. Right kidney appears unremarkable. No hydronephrosis is seen. There is an indwelling Del Cid catheter. There  is air within the urinary bladder and left urinary tract. Right ureter is unremarkable. Prostate gland is unremarkable. Gastrointestinal tract: Limited evaluation of the hollow viscera due to lack of IV contrast administration. There is no evidence of bowel obstruction. Appendix: The appendix is not identified. Other findings: No free air or free fluid. No pathologically enlarged lymph nodes are seen. Vascular calcifications are present. Bones and soft tissues: No acute osseous lesion is identified. Bones are demineralized. There are degenerative changes within the spine. Superficial soft tissues demonstrate no acute abnormality. Lung bases: Small bilateral pleural effusions, left greater than right. Bibasilar atelectasis.     Impression: Impression: 1.No acute abnormality identified within the abdomen or pelvis. 2.Indwelling Del Cid catheter and air noted within the urinary bladder and left urinary tract. Please correlate with urinalysis to exclude urinary tract infection. 3.Several punctate nonobstructing calculi within the left kidney. 4.Small bilateral pleural effusions with bibasilar atelectasis. 5.Additional findings as detailed above. Electronically Signed: Andrea Lopez MD  12/6/2024 12:23 PM EST  Workstation ID: [id number]     Results for orders placed during the hospital encounter of  11/21/24    Adult Transesophageal Echo 3D (FARHAD) W/ Cont If Necessary Per Protocol    Interpretation Summary    There is a 27mm Watchman FLX device in place. It appears well seated and well compressed with no device related thrombus seen. There is a small jet of color flow, 2.5 mm, at the superior aspect adjacent to the left upper pulmonary vein. This was not seen previous examination however image quality is improved compared to prior.    Left ventricular systolic function is moderately decreased. Left ventricular ejection fraction appears to be 36 - 40%. Normal left ventricular wall thickness noted. The left ventricular cavity is dilated. There is left ventricular global hypokinesis noted.    : The right ventricular cavity is mildly dilated. Mildly reduced right ventricular systolic function noted.    : The left atrial cavity is severely dilated. No evidence of a left atrial thrombus present. There is light spontaneous echo contrast present in the atrial body.    The right atrial cavity is severely dilated.    There is mild, bileaflet mitral valve thickening present. Mild mitral valve regurgitation is present. No significant mitral valve stenosis is present.    Mild tricuspid valve regurgitation is present. Estimated right ventricular systolic pressure from tricuspid regurgitation is mildly elevated (35-45 mmHg).    There is moderate pulmonic valve regurgitation present.      Current medications:  Scheduled Meds:amiodarone, 200 mg, Oral, Q12H  aspirin, 81 mg, Oral, Daily  cefTRIAXone, 2,000 mg, Intravenous, Q24H  donepezil, 10 mg, Oral, Nightly  finasteride, 5 mg, Oral, Daily  fluticasone, 2 spray, Each Nare, BID  insulin lispro, 2-7 Units, Subcutaneous, 4x Daily AC & at Bedtime  memantine, 10 mg, Oral, BID  metoprolol tartrate, 12.5 mg, Oral, Q8H  pantoprazole, 40 mg, Oral, Q AM  pravastatin, 40 mg, Oral, Daily  sertraline, 50 mg, Oral, Daily  sodium chloride, 10 mL, Intravenous, Q12H      Continuous Infusions:    PRN Meds:.  Albuterol Sulfate NEB Orderable    senna-docusate sodium **AND** polyethylene glycol **AND** bisacodyl **AND** bisacodyl    Calcium Replacement - Follow Nurse / BPA Driven Protocol    dextrose    dextrose    glucagon (human recombinant)    Magnesium Standard Dose Replacement - Follow Nurse / BPA Driven Protocol    ondansetron    Phosphorus Replacement - Follow Nurse / BPA Driven Protocol    Potassium Replacement - Follow Nurse / BPA Driven Protocol    sodium chloride    sodium chloride    sodium chloride    Assessment & Plan   Assessment & Plan     Active Hospital Problems    Diagnosis  POA    **Orthostatic hypotension [I95.1]  Yes    Acute on chronic urinary retention [R33.9]  Yes    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 male with history of chronic atrial fibrillation s/p watchman, CKD stage III, chronic urinary retention with self caths, HFmr EF, type 2 diabetes, hypertension hyperlipidemia.  Patient sent from urology clinic with complaints of dizziness and orthostatic hypotension.      This patient's problems and plans were partially entered by my partner and updated as appropriate by me 12/08/24.    Acute UTI, POA  Chronic urine retention with severe left hydronephrosis, improved  Patient with known history of chronic retention.  He self cath 4 times a day.  Have been having difficulty self cathing likely secondary to developing urethral stricture  Seen at urology clinic and sent to the hospital because of hypotension  UA concerning for UTI.  Started on IV Rocephin.  Urine culture grew E.coli, susceptible to Ceftriaxone  Urology following  Had a Del Cid catheter placed  per urology recs.  Repeat CT abdomen after Del Cid catheter placement showed complete resolution of the severe left hydronephrosis.  Urologist Dr. Larkin recommended to discharge the patient on Del Cid catheter and follow-up with him in the clinic in 2 weeks     Orthostatic hypotension, resolved  Likely secondary to poor oral intake over the last few days   s/p IV fluids  Lasix on hold     HFmrEF, compensated  Paroxysmal A-fib  Echo from 11/21 with EF 35-to 40%  Recently had increase in Lasix due to lower extremity edema.  Lasix currently on hold because of hypotension  Metoprolol resumed   Started on amiodarone for rate control  Not on anticoagulation s/p  Watchman device  Cardiology following, await final recs       CKD stage III  Creatinine stable at baseline.  Continue to monitor     Well-controlled type 2 diabetes   A1c 5.9%  Continue SSI      Dementia and depression  Continue Aricept and Namenda  Continue Zoloft     Expected Discharge Location and Transportation: Home  Expected Discharge   Expected Discharge Date: 12/9/2024; Expected Discharge Time:       VTE Prophylaxis:  Mechanical VTE prophylaxis orders are present.    AM-PAC 6 Clicks Score (PT): 20 (12/07/24 2114)    CODE STATUS:   There are no questions and answers to display.       Vikram Klein, APRN  12/08/24

## 2024-12-08 NOTE — PROGRESS NOTES
"  [facility] at [facility]  PROGRESS NOTE    Date of Admission: 12/5/2024  Date of Service: 12/08/24    Primary Care Physician: Pito Way MD    Chief Complaint: afib      Subjective      HPI: no events overnight, ventricular rates controlled with most <120 bpm. No further orthostatic events. asymptomatic      Objective   Vitals: /96 (BP Location: Left arm, Patient Position: Lying)   Pulse 108   Temp 97.3 °F (36.3 °C) (Oral)   Resp 18   Ht 190.5 cm (75\")   Wt 94.8 kg (209 lb)   SpO2 99%   BMI 26.12 kg/m²     Physical Exam:   GENERAL: Alert, cooperative, in no acute distress.   HEART: Mildly tachycardic rate and irregularly irregular rhythm; no murmurs, rubs or gallops  LUNGS: Clear to auscultation bilaterally. No wheezing, rales or rhonchi. Nonlabored breathing  NEUROLOGIC: No gross focal abnormalities  EXTREMITIES: No obvious deformities, cyanosis, or edema noted.   PSYCH: normal mood, behavior    Results:  Results from last 7 days   Lab Units 12/08/24  0439 12/07/24  0430 12/05/24  1034   WBC 10*3/mm3 6.83 6.15 9.69   HEMOGLOBIN g/dL 11.3* 11.3* 13.6   HEMATOCRIT % 36.6* 35.3* 43.7   PLATELETS 10*3/mm3 154 149 252     Results from last 7 days   Lab Units 12/08/24  0439 12/07/24  0430 12/06/24  0835   SODIUM mmol/L 138 141 138   POTASSIUM mmol/L 4.2 3.9 4.4   CHLORIDE mmol/L 106 105 105   CO2 mmol/L 27.0 27.0 27.0   BUN mg/dL 21 21 31*   CREATININE mg/dL 0.68* 0.66* 0.94   GLUCOSE mg/dL 124* 105* 119*      Lab Results   Component Value Date    CHOL 75 05/07/2024    TRIG 83 05/07/2024    HDL 26 (L) 05/07/2024    LDL 32 05/07/2024    AST 33 12/08/2024    ALT 15 12/08/2024                         Results from last 7 days   Lab Units 12/05/24  1034   HSTROP T ng/L 36*             Intake/Output Summary (Last 24 hours) at 12/8/2024 1239  Last data filed at 12/7/2024 2114  Gross per 24 hour   Intake --   Output 650 ml   Net -650 ml       I personally reviewed the patient's " EKG/Telemetry data    Radiology Data:   CT Abdomen Pelvis Without Contrast    Result Date: 12/6/2024  Impression: 1.No acute abnormality identified within the abdomen or pelvis. 2.Indwelling Del Cid catheter and air noted within the urinary bladder and left urinary tract. Please correlate with urinalysis to exclude urinary tract infection. 3.Several punctate nonobstructing calculi within the left kidney. 4.Small bilateral pleural effusions with bibasilar atelectasis. 5.Additional findings as detailed above. Electronically Signed: Andrea Lopez MD  12/6/2024 12:23 PM EST  Workstation ID: [id number]    XR Chest 1 View    Result Date: 12/5/2024  Impression: Improved left lower lobe infiltrate. No new abnormality. Electronically Signed: Aster Mathur MD  12/5/2024 11:36 AM EST  Workstation ID: [id number]       Current Medications:  amiodarone, 200 mg, Oral, Q12H  aspirin, 81 mg, Oral, Daily  cefTRIAXone, 2,000 mg, Intravenous, Q24H  donepezil, 10 mg, Oral, Nightly  finasteride, 5 mg, Oral, Daily  fluticasone, 2 spray, Each Nare, BID  insulin lispro, 2-7 Units, Subcutaneous, 4x Daily AC & at Bedtime  memantine, 10 mg, Oral, BID  metoprolol tartrate, 12.5 mg, Oral, Q8H  pantoprazole, 40 mg, Oral, Q AM  pravastatin, 40 mg, Oral, Daily  sertraline, 50 mg, Oral, Daily  sodium chloride, 10 mL, Intravenous, Q12H         A&P  Permanent A-fib  S/p Watchman , needs to be on aspirin  Rates not controlled likely due to acute issues, started on amiodarone for rate control yesterday. OK to continue in short term  Midodrine not a good idea with acute urinary problems  Metoprolol tartrate 25 mg bid  Will have to accept higher rates in setting of acute issues. Would expect them to improve as noncardiac issues improve.  HFrEF  FARHAD LVEF 36-40%  Euvolemic on exam, no oxygen requirement  Orthostasis  Hypertension with hypotension  Dyslipidemia  Chronically elevated hs Trop     Rate control acceptable with most v rates <120 bpm. OK for  discharge on metoprolol tartrate 50 mg bid from cardiac standpoint. Aptient educated on conservative preventative measures against more significant orthostatic events in detail.  Follow up with Dr. Mcdaniel outpatient in 4-6 weeks after urology plan established.     Electronically signed by Harley Jasso PA-C, 12/08/24, 1:14 PM EST.

## 2024-12-08 NOTE — PLAN OF CARE
Goal Outcome Evaluation:  Plan of Care Reviewed With: patient        Progress: improving  Outcome Evaluation: VSS. Pt remains on RA with no signs of SOA. Pt remains A. fib on the monitor. No pain noted.

## 2024-12-08 NOTE — DISCHARGE PLACEMENT REQUEST
"Darien Camarena \"[place]\" (89 y.o. Male)      Case management [phone]       Date of Birth   1935    Social Security Number       [place]   [address] [facility] [address]    Home Phone   [phone]    MRN   [id number]       Buddhism   Hinduism    Marital Status                               Admission Date   24    Admission Type   Emergency    Admitting Provider   Clarence Samaniego MD    Attending Provider   Clarence Samaniego MD    Department, Room/Bed   [facility] 6B, N631/1       Discharge Date       Discharge Disposition       Discharge Destination                                 Attending Provider: Clarence Samaniego MD    Allergies: Sglt2 Inhibitors, Xarelto [Rivaroxaban], Penicillins    Isolation: Contact   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), 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)   Code Status: Prior    Ht: 190.5 cm (75\")   Wt: 94.8 kg (209 lb)    Admission Cmt: None   Principal Problem: Orthostatic hypotension [I95.1]                   Active Insurance as of 2024       Primary Coverage       Payor Plan Insurance Group Employer/Plan Group    ANTHEM MEDICARE REPLACEMENT ANTHEM MED ADV HMO [id number]       Payor Plan Address Payor Plan Phone Number Payor Plan Fax Number Effective Dates    [address] [phone]  2024 - None Entered    [facility] [id number]         Subscriber Name Subscriber Birth Date Member ID       DARIEN CAMARENA 1935 [id number]                     Emergency Contacts        (Rel.) Home Phone Work Phone Mobile Phone    Nikki Ware (Daughter) [phone] -- [phone]    Columba Hayden (Daughter) [phone] -- [phone]    Dolly Taylor (Daughter) [phone] -- [phone]                 History & Physical        Junaid Acosta MD at 24 [facility]              [facility] Medicine Services  HISTORY AND PHYSICAL    Patient Name: Darien Camarena  : 1935  MRN: " [id number]  Primary Care Physician: Pito Way MD  Date of admission: 12/5/2024      Subjective  Subjective     Chief Complaint: Dizziness, urinary retention    HPI:  Darien Camarena is a 89 y.o. male with history of chronic atrial fibrillation s/p watchman, CKD stage III, chronic urinary retention with self caths, HFmr EF, type 2 diabetes, hypertension hyperlipidemia.  Patient presents from urology clinic with complaints of dizziness, trouble self cathing for the last couple of days.  While in the office, patient was found to be hypotensive with SBP in the 90s and symptomatic and as such was sent to the ED.  On arrival here his BP was 67/44, he was tachycardic with heart rate of 121.  Initial lab work revealed lactate 3.0, otherwise f/u unremarkable.  UA was obtained, blood cultures sent, he was started on broad-spectrum antibiotics, received a dose of diltiazem and hospital medicine asked to admit.  At the time of my evaluation, patient was able to self cath, he continued to endorse dizziness mostly upon standing, denies any falls he denies any fevers or chills, no nausea no vomiting, no SOA      Personal History     Past Medical History:   Diagnosis Date    Arrhythmia     Atrial fibrillation     Chronic kidney disease     COPD (chronic obstructive pulmonary disease)     Coronary artery disease     Diabetes mellitus     doesnt check sugar    E. coli sepsis 06/23/2022    Enlarged prostate without lower urinary tract symptoms (luts) 06/20/2016    Full dentures     GERD (gastroesophageal reflux disease)     Gout     Hearing aid worn     bilat prn    History of colonoscopy 09/12/2012    History of radiation therapy 02/24/2023    SBRT LLL lung    [place] (hard of hearing)     hearing aids prn    Hyperlipidemia     Hypertension     Kidney stone     surgery x1    Lung cancer     STEVEN on CPAP     compliant with machine    PAF (paroxysmal atrial fibrillation)     Prostatism     Urinary incontinence     Urinary  tract infection     Wears glasses     readers         Oncology Problem List:  Malignant neoplasm of lower lobe of left lung (01/30/2023; Status:   Active)    Oncology/Hematology History   Malignant neoplasm of lower lobe of left lung   1/30/2023 Initial Diagnosis    Malignant neoplasm of lower lobe of left lung (HCC)     2/14/2023 - 2/24/2023 Radiation    Radiation OncologyTreatment Course:  Darien Camarena received 5000 cGy in 5 fractions to left lung via External Beam Radiation - EBRT.       Past Surgical History:   Procedure Laterality Date    ATRIAL APPENDAGE EXCLUSION LEFT WITH TRANSESOPHAGEAL ECHOCARDIOGRAM N/A 11/28/2023    Procedure: Atrial Appendage Occlusion;  Surgeon: Anthony Mcdaniel MD;  Location:  [facility] EP INVASIVE LOCATION;  Service: Cardiovascular;  Laterality: N/A;    CARDIAC CATHETERIZATION Left 09/29/2022    Procedure: Left Heart Cath;  Surgeon: Titus Oliveros IV, MD;  Location:  [facility] CATH INVASIVE LOCATION;  Service: Cardiovascular;  Laterality: Left;    CARDIOVERSION      CATARACT EXTRACTION Bilateral     COLONOSCOPY      CYSTOSCOPY      ENDOSCOPY      possible    ENDOSCOPY N/A 9/5/2024    Procedure: ESOPHAGOGASTRODUODENOSCOPY;  Surgeon: Anthony Arambula MD;  Location:  [facility] ENDOSCOPY;  Service: Gastroenterology;  Laterality: N/A;  Esophagus dilated to 18mm with 12mm-mm to 15mm, then 15mm to 18mm balloon.    ENDOSCOPY N/A 10/31/2024    Procedure: ESOPHAGOGASTRODUODENOSCOPY WITH BIOPSY;  Surgeon: Carlos Dior MD;  Location:  [facility] ENDOSCOPY;  Service: Gastroenterology;  Laterality: N/A;    ERCP N/A 9/5/2024    Procedure: ENDOSCOPIC RETROGRADE CHOLANGIOPANCREATOGRAPHY;  Surgeon: Anthony Arambula MD;  Location:  [facility] ENDOSCOPY;  Service: Gastroenterology;  Laterality: N/A;  Sphincterotomy made to ampula of common bile duct (CBD), CBD swept with 9mm-12mm balloon - sludge, stones and purulent appearing drainage extracted. 10x7 [place] biliary stent depolyed into CBD. ERCP  scope removed with balloon intact.    ERCP N/A 10/31/2024    Procedure: ENDOSCOPIC RETROGRADE CHOLANGIOPANCREATOGRAPHY;  Surgeon: Carlos Dior MD;  Location: [facility] ENDOSCOPY;  Service: Gastroenterology;  Laterality: N/A;    KIDNEY STONE SURGERY      x1       Family History: family history includes Alzheimer's disease in his mother; COPD in his father; Cancer in his father; Kidney disease in his father; Lung cancer in his father; No Known Problems in his daughter, daughter, and daughter.     Social History:  reports that he quit smoking about 64 years ago. His smoking use included cigarettes. He started smoking about 77 years ago. He has a 15 pack-year smoking history. He has been exposed to tobacco smoke. He quit smokeless tobacco use about 13 years ago.  His smokeless tobacco use included chew. He reports that he does not drink alcohol and does not use drugs.  Social History     Social History Narrative    Caffeine: 1 cup of decaff coffee daily        Medications:  Available home medication information reviewed.  Coenzyme Q10, Iron, Probiotic Product, albuterol sulfate HFA, allopurinol, ascorbic acid, docusate sodium, donepezil, finasteride, furosemide, memantine, metFORMIN, methenamine, metoprolol tartrate, multivitamin with minerals, omeprazole, potassium chloride, pravastatin, sertraline, and tiotropium bromide-olodaterol    Allergies   Allergen Reactions    Sglt2 Inhibitors Other (See Comments)     Recurrent UTI    Xarelto [Rivaroxaban] GI Bleeding     GI bleed    Penicillins Hives     Has tolerated cefepime, ceftriaxone, cefazolin, cefdinir, cefuroxime, cephalexin       Objective  Objective     Vital Signs:   Temp:  [97.7 °F (36.5 °C)] 97.7 °F (36.5 °C)  Heart Rate:  [] 112  Resp:  [16] 16  BP: ()/(44-95) 117/87       Physical Exam   Constitutional: Elderly male, in no awake, alert  Eyes: PERRLA, sclerae anicteric, no conjunctival injection  HENT: NCAT, mucous membranes  moist  Neck: Supple, no thyromegaly, no lymphadenopathy, trachea midline  Respiratory: Clear to auscultation bilaterally, nonlabored respirations   Cardiovascular: Irregular irregular no murmurs, rubs, or gallops, palpable pedal pulses bilaterally  Gastrointestinal: Positive bowel sounds, soft, nontender, nondistended  Musculoskeletal: No bilateral ankle edema, no clubbing or cyanosis to extremities  Psychiatric: Appropriate affect, cooperative  Neurologic: Oriented x 3, strength symmetric in all extremities, Cranial Nerves grossly intact to confrontation, speech clear  Skin: No rashes     Result Review:  I have personally reviewed the results from the time of this admission to 12/5/2024 15:04 EST and agree with these findings:  [x]  Laboratory list / accordion  []  Microbiology  []  Radiology  []  EKG/Telemetry   []  Cardiology/Vascular   []  Pathology  []  Old records  []  Other:  Most notable findings include:     LAB RESULTS:      Lab 12/05/24  1350 12/05/24  1034   WBC  --  9.69   HEMOGLOBIN  --  13.6   HEMATOCRIT  --  43.7   PLATELETS  --  252   NEUTROS ABS  --  7.39*   IMMATURE GRANS (ABS)  --  0.03   LYMPHS ABS  --  1.47   MONOS ABS  --  0.48   EOS ABS  --  0.25   MCV  --  100.0*   PROCALCITONIN  --  0.05   LACTATE 2.9* 3.0*         Lab 12/05/24  1034   SODIUM 142   POTASSIUM 5.0   CHLORIDE 100   CO2 29.0   ANION GAP 13.0   BUN 40*   CREATININE 1.19   EGFR 58.8*   GLUCOSE 106*   CALCIUM 9.7   MAGNESIUM 1.7         Lab 12/05/24  1034   TOTAL PROTEIN 7.7   ALBUMIN 3.5   GLOBULIN 4.2   ALT (SGPT) 18   AST (SGOT) 41*   BILIRUBIN 0.9   ALK PHOS 221*         Lab 12/05/24  1034   HSTROP T 36*                 UA          5/7/2024    15:07 5/7/2024    15:16 9/4/2024    21:48 12/5/2024    10:52 12/5/2024    14:32   Urinalysis   Squamous Epithelial Cells, UA 3-6   3-6   None Seen    Specific [place], UA   1.015   1.010    Ketones, UA  Negative  Negative  Negative  Negative    Blood, UA   Negative   Negative     Leukocytes, UA  500 Germaine/ul  Small (1+)  Large (3+)  Moderate (2+)    Nitrite, UA   Negative   Positive    RBC, UA 0-2   0-2   0-2    WBC, UA 6-10   11-20   21-50    Bacteria, UA Trace   3+   4+        Microbiology Results (last 10 days)       ** No results found for the last 240 hours. **            XR Chest 1 View    Result Date: 12/5/2024  XR CHEST 1 VW Date of Exam: 12/5/2024 11:07 AM EST Indication: Weak/Dizzy/AMS triage protocol Comparison: 11/6/2024 Findings: Left lower lobe infiltrate has improved with minimal infiltrate remaining. Heart size is normal. Right lung is clear. There are no effusions.     Impression: Impression: Improved left lower lobe infiltrate. No new abnormality. Electronically Signed: Aster Mathur MD  12/5/2024 11:36 AM EST  Workstation ID: [id number]     Results for orders placed during the hospital encounter of 11/21/24    Adult Transesophageal Echo 3D (FARHAD) W/ Cont If Necessary Per Protocol    Interpretation Summary    There is a 27mm Watchman FLX device in place. It appears well seated and well compressed with no device related thrombus seen. There is a small jet of color flow, 2.5 mm, at the superior aspect adjacent to the left upper pulmonary vein. This was not seen previous examination however image quality is improved compared to prior.    Left ventricular systolic function is moderately decreased. Left ventricular ejection fraction appears to be 36 - 40%. Normal left ventricular wall thickness noted. The left ventricular cavity is dilated. There is left ventricular global hypokinesis noted.    : The right ventricular cavity is mildly dilated. Mildly reduced right ventricular systolic function noted.    : The left atrial cavity is severely dilated. No evidence of a left atrial thrombus present. There is light spontaneous echo contrast present in the atrial body.    The right atrial cavity is severely dilated.    There is mild, bileaflet mitral valve thickening present. Mild  mitral valve regurgitation is present. No significant mitral valve stenosis is present.    Mild tricuspid valve regurgitation is present. Estimated right ventricular systolic pressure from tricuspid regurgitation is mildly elevated (35-45 mmHg).    There is moderate pulmonic valve regurgitation present.      Assessment & Plan  Assessment & Plan       Acute on chronic urinary retention    Essential hypertension    Stage 3 chronic kidney disease    Orthostatic hypotension    Heart failure with mildly reduced ejection fraction (HFmrEF)    [place]    Darien Camarena is a 89 y.o. male with history of chronic atrial fibrillation s/p watchman, CKD stage III, chronic urinary retention with self caths, HFmr EF, type 2 diabetes, hypertension hyperlipidemia.  Patient sent from urology clinic with complaints of dizziness and hypotension    Orthostatic hypotension  Hypotension  -Patient complaining of dizziness while standing up  -BP is low on arrival here  -Hold antihypertensives and diuretics  -Resolved start IV fluids and repeat orthostatic vital signs  -May need med adjustments at discharge     Chronic atrial fibrillation  -S/p watchman, heart rate elevated in the ED  -He s/p Cardizem 10 mg x 1, he is on metoprolol 100 mg twice daily, will continue with holding parameters  -Cardiology consulted, sees Dr. Oliveros    CKD stage III  -Renal function seems to be stable and within his baseline    HFmrEF  -Patient seems to be currently compensated, last echo on file 11/21 with EF 36-to 40%  -Recently had a recent increase in Lasix due to lower extremity edema  -Hold diuretics at this time due to hypotension    Chronic urinary retention  Suspected UTI  -Patient self caths 4 times daily, has been having difficulty recently though he was able to self cath in the ED today  -Seen in urology clinic this morning, they are consulted  -Follow-up UA, positive leuks follow-up blood and urine cultures, did receive a dose of vancomycin and  cefepime in the ED, will hold off on further antibiotics at this time  -Continue Proscar    Well-controlled type 2 diabetes with A1c 5.9%  -Continue SSI for now, hold metformin    Dementia and depression  -Daughter stated that this has been progressive  -Continue Aricept and Namenda  -Continue Zoloft    VTE Prophylaxis:  Mechanical VTE prophylaxis orders are signed & held.      CODE STATUS:    There are no questions and answers to display.     Expected Discharge   Expected Discharge Date: 12/7/2024; Expected Discharge Time:      Junaid Acosta MD  12/05/24     Electronically signed by Junaid Acosta MD at 12/05/24 1504       Prior to Admission Medications       Prescriptions Last Dose Informant Patient Reported? Taking?    albuterol sulfate  (90 Base) MCG/ACT inhaler Past Week  No Yes    Inhale 2 puffs Every 4 (Four) Hours As Needed for Wheezing.    allopurinol (ZYLOPRIM) 300 MG tablet 12/4/2024  No Yes    Take 1 tablet by mouth Daily.    ascorbic acid (VITAMIN C) 1000 MG tablet 12/5/2024 Self Yes Yes    Take 1 tablet by mouth 2 (Two) Times a Day.    Coenzyme Q10 300 MG capsule 12/4/2024 Self Yes Yes    Take 1 capsule by mouth Every Night.    docusate sodium (COLACE) 100 MG capsule Past Week Self Yes Yes    Take 2 capsules by mouth At Night As Needed for Constipation.    donepezil (ARICEPT) 10 MG tablet 12/4/2024  No Yes    Take 1 tablet by mouth Every Night.    Ferrous Gluconate (IRON) 240 (27 FE) MG tablet 12/4/2024 Self Yes Yes    Take 1 tablet by mouth Daily.    finasteride (PROSCAR) 5 MG tablet 12/4/2024  No Yes    Take 1 tablet by mouth every night at bedtime.    furosemide (LASIX) 40 MG tablet 12/4/2024  No Yes    Take 1 tablet by mouth Daily.    memantine (NAMENDA) 10 MG tablet 12/5/2024  No Yes    Take 1 tablet by mouth 2 (Two) Times a Day.    metFORMIN (GLUCOPHAGE) 1000 MG tablet 12/5/2024  No Yes    Take 1 tablet by mouth 2 (Two) Times a Day.    methenamine (HIPREX) 1 g tablet 12/5/2024  No Yes     "Take 1 tablet by mouth 2 (Two) Times a Day With Meals.    metoprolol tartrate (LOPRESSOR) 100 MG tablet 12/5/2024  No Yes    Take 1 tablet by mouth 2 (Two) Times a Day.    multivitamin with minerals (MULTIVITAMIN MEN 50+ PO) 12/4/2024 Self Yes Yes    Take 1 tablet by mouth Every Night. Centrum Sliver    omeprazole (priLOSEC) 20 MG capsule 12/5/2024  No Yes    Take 1 capsule by mouth 2 (Two) Times a Day.    potassium chloride 10 MEQ CR tablet 12/4/2024  No Yes    Take 2 tablets by mouth Daily.    pravastatin (PRAVACHOL) 40 MG tablet 12/4/2024  No Yes    Take 1 tablet by mouth Daily.    Patient taking differently:  Take 1 tablet by mouth Every Night.    Probiotic Product (PROBIOTIC ADVANCED PO) 12/5/2024 Self Yes Yes    Take 1 tablet by mouth 2 (Two) Times a Day.    sertraline (ZOLOFT) 50 MG tablet 12/4/2024  No Yes    Take 1 tablet by mouth Daily.    tiotropium bromide-olodaterol (STIOLTO RESPIMAT) 2.5-2.5 MCG/ACT aerosol solution inhaler 12/4/2024 Self No Yes    Inhale 2 puffs Daily. 2 inh once a day             Physician Progress Notes (most recent note)        Harley Jasso PA-C at 12/08/24 1239            [facility]  PROGRESS NOTE    Date of Admission: 12/5/2024  Date of Service: 12/08/24    Primary Care Physician: Pito Way MD    Chief Complaint: afib      Subjective      HPI: no events overnight, ventricular rates controlled with most <120 bpm. No further orthostatic events. asymptomatic      Objective   Vitals: /96 (BP Location: Left arm, Patient Position: Lying)   Pulse 108   Temp 97.3 °F (36.3 °C) (Oral)   Resp 18   Ht 190.5 cm (75\")   Wt 94.8 kg (209 lb)   SpO2 99%   BMI 26.12 kg/m²     Physical Exam:   GENERAL: Alert, cooperative, in no acute distress.   HEART: Mildly tachycardic rate and irregularly irregular rhythm; no murmurs, rubs or gallops  LUNGS: Clear to auscultation bilaterally. No wheezing, rales or rhonchi. Nonlabored " breathing  NEUROLOGIC: No gross focal abnormalities  EXTREMITIES: No obvious deformities, cyanosis, or edema noted.   PSYCH: normal mood, behavior    Results:  Results from last 7 days   Lab Units 12/08/24  0439 12/07/24  0430 12/05/24  1034   WBC 10*3/mm3 6.83 6.15 9.69   HEMOGLOBIN g/dL 11.3* 11.3* 13.6   HEMATOCRIT % 36.6* 35.3* 43.7   PLATELETS 10*3/mm3 154 149 252     Results from last 7 days   Lab Units 12/08/24  0439 12/07/24  0430 12/06/24  0835   SODIUM mmol/L 138 141 138   POTASSIUM mmol/L 4.2 3.9 4.4   CHLORIDE mmol/L 106 105 105   CO2 mmol/L 27.0 27.0 27.0   BUN mg/dL 21 21 31*   CREATININE mg/dL 0.68* 0.66* 0.94   GLUCOSE mg/dL 124* 105* 119*      Lab Results   Component Value Date    CHOL 75 05/07/2024    TRIG 83 05/07/2024    HDL 26 (L) 05/07/2024    LDL 32 05/07/2024    AST 33 12/08/2024    ALT 15 12/08/2024                         Results from last 7 days   Lab Units 12/05/24  1034   HSTROP T ng/L 36*             Intake/Output Summary (Last 24 hours) at 12/8/2024 1239  Last data filed at 12/7/2024 2114  Gross per 24 hour   Intake --   Output 650 ml   Net -650 ml       I personally reviewed the patient's EKG/Telemetry data    Radiology Data:   CT Abdomen Pelvis Without Contrast    Result Date: 12/6/2024  Impression: 1.No acute abnormality identified within the abdomen or pelvis. 2.Indwelling Del Cid catheter and air noted within the urinary bladder and left urinary tract. Please correlate with urinalysis to exclude urinary tract infection. 3.Several punctate nonobstructing calculi within the left kidney. 4.Small bilateral pleural effusions with bibasilar atelectasis. 5.Additional findings as detailed above. Electronically Signed: Andrea Lopez MD  12/6/2024 12:23 PM EST  Workstation ID: [id number]    XR Chest 1 View    Result Date: 12/5/2024  Impression: Improved left lower lobe infiltrate. No new abnormality. Electronically Signed: Aster Mathur MD  12/5/2024 11:36 AM EST  Workstation ID: [id number]        Current Medications:  amiodarone, 200 mg, Oral, Q12H  aspirin, 81 mg, Oral, Daily  cefTRIAXone, 2,000 mg, Intravenous, Q24H  donepezil, 10 mg, Oral, Nightly  finasteride, 5 mg, Oral, Daily  fluticasone, 2 spray, Each Nare, BID  insulin lispro, 2-7 Units, Subcutaneous, 4x Daily AC & at Bedtime  memantine, 10 mg, Oral, BID  metoprolol tartrate, 12.5 mg, Oral, Q8H  pantoprazole, 40 mg, Oral, Q AM  pravastatin, 40 mg, Oral, Daily  sertraline, 50 mg, Oral, Daily  sodium chloride, 10 mL, Intravenous, Q12H         A&P  Permanent A-fib  S/p Watchman , needs to be on aspirin  Rates not controlled likely due to acute issues, started on amiodarone for rate control yesterday. OK to continue in short term  Midodrine not a good idea with acute urinary problems  Metoprolol tartrate 25 mg bid  Will have to accept higher rates in setting of acute issues. Would expect them to improve as noncardiac issues improve.  HFrEF  FARHAD LVEF 36-40%  Euvolemic on exam, no oxygen requirement  Orthostasis  Hypertension with hypotension  Dyslipidemia  Chronically elevated hs Trop     Rate control acceptable with most v rates <120 bpm. OK for discharge on metoprolol tartrate 50 mg bid from cardiac standpoint. Aptient educated on conservative preventative measures against more significant orthostatic events in detail.  Follow up with Dr. Mcdaniel outpatient in 4-6 weeks after urology plan established.     Electronically signed by Harley Jasso PA-C, 12/08/24, 1:14 PM EST.        Electronically signed by Harley Jasso PA-C at 12/08/24 1316          Consult Notes (most recent note)        Jose Dennis MD at 12/06/24 0839        Consult Orders    1. Inpatient Cardiology Consult [[id number]] ordered by Junaid Acosta MD at 12/05/24 1503                 [facility]  Cardiovascular Consultation Note    Reason for consultation: #1 A-fib RVR #2 orthostatic hypotension 3 history of heart failure with reduced  EF    History of Present Illness:  89-year-old male with permanent A-fib status post watchman, heart failure with reduced EF, chronic prostate problems requiring In-N-Out cath, frequent UTIs, diabetes, CKD, hypertension, hyperlipidemia has chronically elevated high sensitive troponin.  The patient was sent over here yesterday from his urologist office because of dizziness and weakness and had a systolic blood pressure of 67.  Here vital signs were checked and the patient has orthostasis the patient was last seen in our office on 11/18/2024 patient had negative cardiac cath 2022.  The patient acknowledges at home whenever he stood up he felt very lightheaded.  He states that after receiving IV fluids this is resolved.  He denies any history of lower extremity edema.  He denies any dyspnea on exertion.  He denies tightness heaviness squeezing pressure chest jaw throat arms.  He occasionally wheezes which responds promptly to inhalers.  Patient states he feels really good.  He denies palpitations despite having a rapid heartbeat    Cardiac risk factors: #1 male sex #2 age greater than 55 #3 hypertension #4 hyperlipidemia #5 diabetes  [id number] score is 5-status post watchman    Past medical and surgical history, social and family history reviewed in EMR.    REVIEW OF SYSTEMS:     Past Medical History:   Diagnosis Date    Arrhythmia     Atrial fibrillation     Chronic kidney disease     COPD (chronic obstructive pulmonary disease)     Coronary artery disease     Dementia     Diabetes mellitus     doesnt check sugar    E. coli sepsis 06/23/2022    Enlarged prostate without lower urinary tract symptoms (luts) 06/20/2016    Full dentures     GERD (gastroesophageal reflux disease)     Gout     Hearing aid worn     bilat prn    History of colonoscopy 09/12/2012    History of radiation therapy 02/24/2023    SBRT LLL lung    [place] (hard of hearing)     hearing aids prn    Hyperlipidemia     Hypertension     Kidney stone      surgery x1    Lung cancer     STEVEN on CPAP     compliant with machine    PAF (paroxysmal atrial fibrillation)     Prostatism     Urinary incontinence     Urinary tract infection     Wears glasses     readers     Past Surgical History:   Procedure Laterality Date    ATRIAL APPENDAGE EXCLUSION LEFT WITH TRANSESOPHAGEAL ECHOCARDIOGRAM N/A 11/28/2023    Procedure: Atrial Appendage Occlusion;  Surgeon: Anthony Mcdaniel MD;  Location:  [facility] EP INVASIVE LOCATION;  Service: Cardiovascular;  Laterality: N/A;    CARDIAC CATHETERIZATION Left 09/29/2022    Procedure: Left Heart Cath;  Surgeon: Titus Oliveros IV, MD;  Location:  [facility] CATH INVASIVE LOCATION;  Service: Cardiovascular;  Laterality: Left;    CARDIOVERSION      CATARACT EXTRACTION Bilateral     COLONOSCOPY      CYSTOSCOPY      ENDOSCOPY      possible    ENDOSCOPY N/A 9/5/2024    Procedure: ESOPHAGOGASTRODUODENOSCOPY;  Surgeon: Anthony Arambula MD;  Location:  [facility];  Service: Gastroenterology;  Laterality: N/A;  Esophagus dilated to 18mm with 12mm-mm to 15mm, then 15mm to 18mm balloon.    ENDOSCOPY N/A 10/31/2024    Procedure: ESOPHAGOGASTRODUODENOSCOPY WITH BIOPSY;  Surgeon: Carlos Dior MD;  Location:  [facility];  Service: Gastroenterology;  Laterality: N/A;    ERCP N/A 9/5/2024    Procedure: ENDOSCOPIC RETROGRADE CHOLANGIOPANCREATOGRAPHY;  Surgeon: Anthony Arambula MD;  Location:  [facility];  Service: Gastroenterology;  Laterality: N/A;  Sphincterotomy made to ampula of common bile duct (CBD), CBD swept with 9mm-12mm balloon - sludge, stones and purulent appearing drainage extracted. 10x7 [place] biliary stent depolyed into CBD. ERCP scope removed with balloon intact.    ERCP N/A 10/31/2024    Procedure: ENDOSCOPIC RETROGRADE CHOLANGIOPANCREATOGRAPHY;  Surgeon: Carlos Dior MD;  Location:  [facility];  Service: Gastroenterology;  Laterality: N/A;    KIDNEY STONE SURGERY      x1     Social History      Socioeconomic History    Marital status:    Tobacco Use    Smoking status: Former     Current packs/day: 0.00     Average packs/day: 1 pack/day for 15.0 years (15.0 ttl pk-yrs)     Types: Cigarettes     Start date: 1947     Quit date: 1960     Years since quittin.6     Passive exposure: Past    Smokeless tobacco: Former     Types: Chew     Quit date:     Tobacco comments:     started at 13 yo.   Vaping Use    Vaping status: Never Used    Passive vaping exposure: Yes   Substance and Sexual Activity    Alcohol use: No    Drug use: Never    Sexual activity: Defer     Partners: Female     Family History   Problem Relation Age of Onset    Alzheimer's disease Mother     COPD Father     Lung cancer Father     Cancer Father     Kidney disease Father     No Known Problems Daughter     No Known Problems Daughter     No Known Problems Daughter        H&P ROS reviewed and pertinent CV ROS as noted in HPI.    Cardiac: Has known permanent atrial fibrillation and heart failure with reduced EF.  He denies shortness of breath no anginal type pain no edema  Respiratory: Has COPD/wheezes occasionally but this responds to inhaler  Lower Extremities: Denies edema  GI: No nausea vomiting diarrhea bright blood per rectum or melena  Neuro: No history of stroke TIA or neurologic event  Hematology: Has history of anemia  Renal: History of CKD  Musculoskeletal: Does not complain of any specific joint pain  Endocrine: Has diabetes but no hypothyroidism  Constitutional: States his appetite is fine.  Denies fever chills  Psych: Has dementia but no tarah depression      Physical Exam: General Very pleasant well-developed male sitting in bed not dyspneic not tachypneic but is tachycardic       HEENT: No JVP.  No bruits.  No icterus       Respiratory: Equal bilateral symmetrical expansion with a rare crackle on the left.  No wheezes or rhonchi       Cardiovascular: Tachycardic and irregular without any obvious murmur no  edema to palpation       GI: Soft and flat       Lower Extremities: No lesions       Neuro: Facial expressions are symmetrical moves all 4 extremities       Skin: Warm and dry no edema palpation       Psych: Pleasant affect    Results Review: EKG shows A-fib RVR nonspecific ST abnormalities with no significant change from prior EKGs.  HST is elevated but they are chronically elevated.  BNP is elevated.  GFR is 58 the most recent one prior to that was 61.9.  Hemoglobin is 13.6.  The BNP level is similar to previous BNP June 2024      Objective     Vital Sign Min/Max for last 24 hours  Temp  Min: 97.7 °F (36.5 °C)  Max: 98 °F (36.7 °C)   BP  Min: 67/44  Max: 122/78   Pulse  Min: 67  Max: 131   Resp  Min: 16  Max: 22   SpO2  Min: 90 %  Max: 99 %   No data recorded      Intake/Output Summary (Last 24 hours) at 12/6/2024 0839  Last data filed at 12/6/2024 0510  Gross per 24 hour   Intake 1100 ml   Output 800 ml   Net 300 ml             Current Facility-Administered Medications:     albuterol (PROVENTIL) nebulizer solution 0.083% 2.5 mg/3mL, 2.5 mg, Nebulization, Q4H PRN, Cindy Wade, [facility]    sennosides-docusate (PERICOLACE) 8.6-50 MG per tablet 2 tablet, 2 tablet, Oral, BID PRN **AND** polyethylene glycol (MIRALAX) packet 17 g, 17 g, Oral, Daily PRN **AND** bisacodyl (DULCOLAX) EC tablet 5 mg, 5 mg, Oral, Daily PRN **AND** bisacodyl (DULCOLAX) suppository 10 mg, 10 mg, Rectal, Daily PRN, Junaid Acosta MD    Calcium Replacement - Follow Nurse / BPA Driven Protocol, , Not Applicable, PRN, Junaid Acosta MD    dextrose (D50W) (25 g/50 mL) IV injection 25 g, 25 g, Intravenous, Q15 Min PRN, Junaid Acosta MD    dextrose (GLUTOSE) oral gel 15 g, 15 g, Oral, Q15 Min PRN, Junaid Acosta MD    donepezil (ARICEPT) tablet 10 mg, 10 mg, Oral, Nightly, Opii, [place]ycl[place], MD, 10 mg at 12/05/24 2139    ertapenem (INVanz) 1,000 mg in sodium chloride 0.9 % 100 mL MBP, 1,000 mg, Intravenous, Q24H, Clarence Samaniego MD     finasteride (PROSCAR) tablet 5 mg, 5 mg, Oral, Daily, Junaid Acosta MD, 5 mg at 12/05/24 1749    glucagon (GLUCAGEN) injection 1 mg, 1 mg, Intramuscular, Q15 Min PRN, Junaid Acosta MD    Insulin Lispro (humaLOG) injection 2-7 Units, 2-7 Units, Subcutaneous, 4x Daily AC & at Bedtime, Junaid Acosta MD    Magnesium Standard Dose Replacement - Follow Nurse / BPA Driven Protocol, , Not Applicable, PRN, Junaid Acosta MD    memantine (NAMENDA) tablet 10 mg, 10 mg, Oral, BID, Junaid Acosta MD, 10 mg at 12/05/24 2139    ondansetron (ZOFRAN) injection 4 mg, 4 mg, Intravenous, Q6H PRN, Junaid Acosta MD    pantoprazole (PROTONIX) EC tablet 40 mg, 40 mg, Oral, Q AM, Junaid Acosta MD, 40 mg at 12/06/24 0447    Phosphorus Replacement - Follow Nurse / BPA Driven Protocol, , Not Applicable, PRN, Junaid Acosta MD    Potassium Replacement - Follow Nurse / BPA Driven Protocol, , Not Applicable, BRENT, Junaid Acosta MD    pravastatin (PRAVACHOL) tablet 40 mg, 40 mg, Oral, Daily, Junaid Acosta MD    sertraline (ZOLOFT) tablet 50 mg, 50 mg, Oral, Daily, Junaid Acosta MD    sodium chloride 0.9 % flush 10 mL, 10 mL, Intravenous, PRN, Alexx Araujo MD    sodium chloride 0.9 % flush 10 mL, 10 mL, Intravenous, Q12H, Junaid Acosta MD    sodium chloride 0.9 % flush 10 mL, 10 mL, Intravenous, PRN, Junaid Acosta MD    sodium chloride 0.9 % infusion 40 mL, 40 mL, Intravenous, PRN, Junaid Acosta MD    sodium chloride 0.9 % infusion, 75 mL/hr, Intravenous, Continuous, Junaid Acosta MD, Last Rate: 75 mL/hr at 12/05/24 1826, 75 mL/hr at 12/05/24 1826    Lab Review:   Results from last 7 days   Lab Units 12/05/24  1034   WBC 10*3/mm3 9.69   HEMOGLOBIN g/dL 13.6   PLATELETS 10*3/mm3 252     Results from last 7 days   Lab Units 12/05/24  1034   SODIUM mmol/L 142   POTASSIUM mmol/L 5.0   CO2 mmol/L 29.0   BUN mg/dL 40*   CREATININE mg/dL 1.19   MAGNESIUM mg/dL 1.7   GLUCOSE mg/dL 106*     Estimated Creatinine Clearance:  57.5 mL/min (by C-G formula based on SCr of 1.19 mg/dL).        .lipd  Lab Results   Component Value Date    TRIG 83 05/07/2024    HDL 26 (L) 05/07/2024    AST 41 (H) 12/05/2024    ALT 18 12/05/2024       Radiology Reports:  Imaging Results (Last 72 Hours)       Procedure Component Value Units Date/Time    XR Chest 1 View [[id number]] Collected: 12/05/24 1134     Updated: 12/05/24 1139    Narrative:      XR CHEST 1 VW    Date of Exam: 12/5/2024 11:07 AM EST    Indication: Weak/Dizzy/AMS triage protocol    Comparison: 11/6/2024    Findings:  Left lower lobe infiltrate has improved with minimal infiltrate remaining. Heart size is normal. Right lung is clear. There are no effusions.      Impression:      Impression:  Improved left lower lobe infiltrate. No new abnormality.      Electronically Signed: Aster Mathur MD    12/5/2024 11:36 AM EST    Workstation ID: [id number]            Assessment/Plan: 1 A-fib RVR-the patient's heart rate is significantly elevated even just sitting in bed eating breakfast.  Unfortunately his blood pressure is tenuous so I cannot really increase his AV prabhu agents.  In addition of that he has CKD and I would like to avoid digoxin.  In addition he has orthostatic hypotension symptoms.  Although not ideal I will start the patient on p.o. amiodarone for better rate control.  If he has significant orthostatic hypotension following hydration we will have to lower his dose of beta-blockers.  2 orthostatic hypotension-clinically the patient is improved have ordered a repeat take of orthostatic vitals this morning  3 heart failure with reduced EF patient appears euvolemic i.e. no JVP, no shortness of breath  4 chronically elevated high-sensitivity troponin-patient had nonobstructive coronary disease in 2022, he denies tightness heaviness squeezing pressure chest jaw throat arms.  No ischemic evaluation is warranted      Jose Dennis MD  12/06/24  08:39 EST      Electronically signed by  Jose Dennis MD at 24 0906          Physical Therapy Notes (most recent note)        Cynthia Allison, PT at 24 0844  Version 1 of 1         Patient Name: Darien Camarena  : 1935    MRN: [id number]                              Today's Date: 2024       Admit Date: 2024    Visit Dx:     ICD-10-CM ICD-9-CM   1. Orthostasis  I95.1 458.0   2. Light headedness  R42 780.4   3. Chronic atrial fibrillation with rapid ventricular response  I48.20 427.31   4. Acute on chronic urinary retention  R33.9 788.29     Patient Active Problem List   Diagnosis    Type 2 diabetes mellitus with stage 3 chronic kidney disease, without long-term current use of insulin    Gastroesophageal reflux disease with esophagitis    Hyperlipidemia LDL goal <100    Essential hypertension    Nephrolithiasis    Obstructive sleep apnea syndrome    Permanent atrial fibrillation    Stage 3 chronic kidney disease    Coronary artery disease involving native coronary artery of native heart without angina pectoris    Malignant neoplasm of lower lobe of left lung    H/O: GI bleed    Presence of Watchman left atrial appendage closure device    Orthostatic hypotension    Heart failure with mildly reduced ejection fraction (HFmrEF)    Obesity    Choledocholithiasis    Severe malnutrition    Esophagitis    Encounter for removal of biliary stent    A-fib    Acute on chronic urinary retention     Past Medical History:   Diagnosis Date    Arrhythmia     Atrial fibrillation     Chronic kidney disease     COPD (chronic obstructive pulmonary disease)     Coronary artery disease     Dementia     Diabetes mellitus     doesnt check sugar    E. coli sepsis 2022    Enlarged prostate without lower urinary tract symptoms (luts) 2016    Full dentures     GERD (gastroesophageal reflux disease)     Gout     Hearing aid worn     bilat prn    History of colonoscopy 2012    History of radiation therapy 2023    SBRT LLL  lung    [place] (hard of hearing)     hearing aids prn    Hyperlipidemia     Hypertension     Kidney stone     surgery x1    Lung cancer     STEVEN on CPAP     compliant with machine    PAF (paroxysmal atrial fibrillation)     Prostatism     Urinary incontinence     Urinary tract infection     Wears glasses     readers     Past Surgical History:   Procedure Laterality Date    ATRIAL APPENDAGE EXCLUSION LEFT WITH TRANSESOPHAGEAL ECHOCARDIOGRAM N/A 11/28/2023    Procedure: Atrial Appendage Occlusion;  Surgeon: Anthony Mcdaniel MD;  Location: [facility] IN[facility] LOCATION;  Service: Cardiovascular;  Laterality: N/A;    CARDIAC CATHETERIZATION Left 09/29/2022    Procedure: Left Heart Cath;  Surgeon: Titus Oliveros IV, MD;  Location:  [facility];  Service: Cardiovascular;  Laterality: Left;    CARDIOVERSION      CATARACT EXTRACTION Bilateral     COLONOSCOPY      CYSTOSCOPY      ENDOSCOPY      possible    ENDOSCOPY N/A 9/5/2024    Procedure: ESOPHAGOGASTRODUODENOSCOPY;  Surgeon: Anthony Arambula MD;  Location: [facility];  Service: Gastroenterology;  Laterality: N/A;  Esophagus dilated to 18mm with 12mm-mm to 15mm, then 15mm to 18mm balloon.    ENDOSCOPY N/A 10/31/2024    Procedure: ESOPHAGOGASTRODUODENOSCOPY WITH BIOPSY;  Surgeon: Carlos Dior MD;  Location:  [facility];  Service: Gastroenterology;  Laterality: N/A;    ERCP N/A 9/5/2024    Procedure: ENDOSCOPIC RETROGRADE CHOLANGIOPANCREATOGRAPHY;  Surgeon: Anthony Arambula MD;  Location: [facility];  Service: Gastroenterology;  Laterality: N/A;  Sphincterotomy made to ampula of common bile duct (CBD), CBD swept with 9mm-12mm balloon - sludge, stones and purulent appearing drainage extracted. 10x7 [place] biliary stent depolyed into CBD. ERCP scope removed with balloon intact.    ERCP N/A 10/31/2024    Procedure: ENDOSCOPIC RETROGRADE CHOLANGIOPANCREATOGRAPHY;  Surgeon: Carlos Dior MD;  Location:  [facility];  Service: Gastroenterology;  Laterality: N/A;    KIDNEY STONE SURGERY      x1      General Information       Row Name 12/06/24 0918          Physical Therapy Time and Intention    Document Type evaluation  -ER     Mode of Treatment physical therapy  -ER       Row Name 12/06/24 0918          General Information    Patient Profile Reviewed yes  -ER     Prior Level of Function independent:;all household mobility;gait;transfer;bathing;dressing;min assist:;community mobility;mod assist:;ADL's;home management  living alone, but daughters would come to help as needed. got meals from meals on wheels  -ER     Existing Precautions/Restrictions orthostatic hypotension;fall  -ER     Barriers to Rehab medically complex  -ER       Row Name 12/06/24 0918          Living Environment    People in Home alone  -ER       Row Name 12/06/24 0918          Home Main Entrance    Number of Stairs, Main Entrance one  -ER     Stair Railings, Main Entrance railings safe and in good condition  -ER       Row Name 12/06/24 0918          Stairs Within Home, Primary    Number of Stairs, Within Home, Primary none  -ER       Row Name 12/06/24 0918          Cognition    Orientation Status (Cognition) oriented x 4  -ER       Row Name 12/06/24 0918          Safety Issues/Impairments Affecting Functional Mobility    Safety Issues Affecting Function (Mobility) insight into deficits/self-awareness;safety precaution awareness;awareness of need for assistance  -ER     Impairments Affecting Function (Mobility) balance;endurance/activity tolerance;other (see comments)  orthostatic hypo  -ER               User Key  (r) = Recorded By, (t) = Taken By, (c) = Cosigned By      Initials Name Provider Type    ER Cynthia Allison, PT Physical Therapist                   Mobility       Row Name 12/06/24 0919          Bed Mobility    Bed Mobility rolling left;scooting/bridging;supine-sit  -ER     Rolling Left [place] (Bed Mobility) modified independence  -ER      Scooting/Bridging [place] (Bed Mobility) modified independence  -ER     Supine-Sit [place] (Bed Mobility) modified independence  -ER     Assistive Device (Bed Mobility) bed rails;head of bed elevated  -ER       Row Name 12/06/24 0919          Bed-Chair Transfer    Bed-Chair [place] (Transfers) contact guard;1 person assist  -ER     Assistive Device (Bed-Chair Transfers) walker, front-wheeled  -ER       Row Name 12/06/24 0919          Sit-Stand Transfer    Sit-Stand [place] (Transfers) contact guard  -ER     Assistive Device (Sit-Stand Transfers) walker, front-wheeled  -ER       Row Name 12/06/24 0919          Gait/Stairs (Locomotion)    [place] Level (Gait) contact guard  -ER     Assistive Device (Gait) walker, front-wheeled  -ER     Patient was able to Ambulate yes  -ER     Distance in Feet (Gait) 3  -ER     Deviations/Abnormal Patterns (Gait) bilateral deviations;stride length decreased;gait speed decreased  -ER     Bilateral Gait Deviations forward flexed posture  -ER     Comment, (Gait/Stairs) pt took a few steps to chair. deferred further walking due to blood pressure drop in standing.  -ER               User Key  (r) = Recorded By, (t) = Taken By, (c) = Cosigned By      Initials Name Provider Type    ER Cynthia Allison, PT Physical Therapist                   Obj/Interventions       Row Name 12/06/24 0923          Range of Motion Comprehensive    General Range of Motion no range of motion deficits identified  -ER       Row Name 12/06/24 0923          Strength Comprehensive (MMT)    General Manual Muscle Testing (MMT) Assessment no strength deficits identified  -ER     Comment, General Manual Muscle Testing (MMT) Assessment 5/5 gross MMT BLE  -ER       Row Name 12/06/24 0923          Balance    Balance Assessment sitting static balance;sitting dynamic balance;standing static balance;standing dynamic balance  -ER     Static Sitting Balance modified independence  -ER     Dynamic  Sitting Balance standby assist  -ER     Position, Sitting Balance unsupported;sitting edge of bed  -ER     Static Standing Balance contact guard  -ER     Dynamic Standing Balance contact guard  -ER     Position/Device Used, Standing Balance supported;walker, front-wheeled  -ER     Balance Interventions sitting;standing;sit to stand;supported;static;dynamic;minimal challenge;moderate challenge  -ER       Row Name 12/06/24 0923          Sensory Assessment (Somatosensory)    Sensory Assessment (Somatosensory) sensation intact  -ER               User Key  (r) = Recorded By, (t) = Taken By, (c) = Cosigned By      Initials Name Provider Type    ER Cynthia Allison, PT Physical Therapist                   Goals/Plan       Row Name 12/06/24 0929          Bed Mobility Goal 1 (PT)    Activity/Assistive Device (Bed Mobility Goal 1, PT) bridging;rolling to left;rolling to right  -ER     [place] Level/Cues Needed (Bed Mobility Goal 1, PT) independent  -ER     Time Frame (Bed Mobility Goal 1, PT) short term goal (STG);5 days  -ER       Row Name 12/06/24 0929          Transfer Goal 1 (PT)    Activity/Assistive Device (Transfer Goal 1, PT) sit-to-stand/stand-to-sit;bed-to-chair/chair-to-bed;toilet;walker, rolling  -ER     [place] Level/Cues Needed (Transfer Goal 1, PT) modified independence  -ER     Time Frame (Transfer Goal 1, PT) long term goal (LTG);10 days  -ER       Row Name 12/06/24 0929          Gait Training Goal 1 (PT)    Activity/Assistive Device (Gait Training Goal 1, PT) gait (walking locomotion);assistive device use;backward stepping;walker, rolling  -ER     [place] Level (Gait Training Goal 1, PT) modified independence  -ER     Distance (Gait Training Goal 1, PT) 150  -ER     Time Frame (Gait Training Goal 1, PT) long term goal (LTG);10 days  -ER       Row Name 12/06/24 0929          Therapy Assessment/Plan (PT)    Planned Therapy Interventions (PT) balance training;bed mobility training;gait  training;home exercise program;strengthening;patient/family education;neuromuscular re-education;transfer training  -ER               User Key  (r) = Recorded By, (t) = Taken By, (c) = Cosigned By      Initials Name Provider Type    ER Cynthia Allison, PT Physical Therapist                   Clinical Impression       Row Name 12/06/24 0925          Pain    Pretreatment Pain Rating 0/10 - no pain  -ER     Posttreatment Pain Rating 0/10 - no pain  -ER       Row Name 12/06/24 0925          Plan of Care Review    Plan of Care Reviewed With patient  -ER     Outcome Evaluation PT eval complete. Pt demonstrates some signs of orthostatic hypotension in standing. Pt presents below functional baseline for mobility due to BP variations. He will benefit from skilled PT while at [facility] [place]. Recommend home with assist once medically stable to d/c.  -ER       Row Name 12/06/24 0925          Therapy Assessment/Plan (PT)    Patient/Family Therapy Goals Statement (PT) to go home  -ER     Rehab Potential (PT) good  -ER     Criteria for Skilled Interventions Met (PT) yes;meets criteria;skilled treatment is necessary  -ER     Therapy Frequency (PT) daily  -ER     Predicted Duration of Therapy Intervention (PT) 10 days  -ER       Row Name 12/06/24 0925          Vital Signs    Pre Systolic BP Rehab 115  -ER     Pre Treatment Diastolic BP 89  supine  -ER     Intra Systolic BP Rehab 107  -ER     Intra Treatment Diastolic BP 75  sitting EOB  -ER     Post Systolic BP Rehab 85  -ER     Post Treatment Diastolic BP 60  standing  -ER     O2 Delivery Pre Treatment room air  -ER     O2 Delivery Intra Treatment room air  -ER     O2 Delivery Post Treatment room air  -ER     Pre Patient Position Supine  -ER     Intra Patient Position Standing  -ER     Post Patient Position Sitting  -ER       Row Name 12/06/24 0925          Positioning and Restraints    Pre-Treatment Position in bed  -ER     Post Treatment Position chair  -ER     In Chair notified  nsg;reclined;call light within reach;encouraged to call for assist;exit alarm on;waffle cushion  -ER               User Key  (r) = Recorded By, (t) = Taken By, (c) = Cosigned By      Initials Name Provider Type    Cynthia Sloan PT Physical Therapist                   Outcome Measures       Row Name 12/06/24 0932          How much help from another person do you currently need...    Turning from your back to your side while in flat bed without using bedrails? 3  -ER     Moving from lying on back to sitting on the side of a flat bed without bedrails? 3  -ER     Moving to and from a bed to a chair (including a wheelchair)? 4  -ER     Standing up from a chair using your arms (e.g., wheelchair, bedside chair)? 4  -ER     Climbing 3-5 steps with a railing? 3  -ER     To walk in hospital room? 3  -ER     AM-PAC 6 Clicks Score (PT) 20  -ER     Highest Level of Mobility Goal 6 --> Walk 10 steps or more  -ER       Row Name 12/06/24 0932          Functional Assessment    Outcome Measure Options AM-PAC 6 Clicks Basic Mobility (PT)  -ER               User Key  (r) = Recorded By, (t) = Taken By, (c) = Cosigned By      Initials Name Provider Type    Cynthia Sloan PT Physical Therapist                                 Physical Therapy Education       Title: PT OT SLP Therapies (In Progress)       Topic: Physical Therapy (Done)       Point: Mobility training (Done)       Learning Progress Summary            Patient Acceptance, E, VU by ER at 12/6/2024 0934    Comment: making sure he gets enough fluids in/absorption to help orthostatics, transfer safety, d/c planning                      Point: Home exercise program (Done)       Learning Progress Summary            Patient Acceptance, E, VU by ER at 12/6/2024 0934    Comment: making sure he gets enough fluids in/absorption to help orthostatics, transfer safety, d/c planning                      Point: Body mechanics (Done)       Learning Progress Summary            Patient  Acceptance, E, VU by ER at 12/6/2024 0934    Comment: making sure he gets enough fluids in/absorption to help orthostatics, transfer safety, d/c planning                      Point: Precautions (Done)       Learning Progress Summary            Patient Acceptance, E, VU by ER at 12/6/2024 0934    Comment: making sure he gets enough fluids in/absorption to help orthostatics, transfer safety, d/c planning                                      User Key       Initials Effective Dates Name Provider Type Discipline    ER 11/04/24 -  Cynthia Allison, PT Physical Therapist PT                  PT Recommendation and Plan  Planned Therapy Interventions (PT): balance training, bed mobility training, gait training, home exercise program, strengthening, patient/family education, neuromuscular re-education, transfer training  Outcome Evaluation: PT eval complete. Pt demonstrates some signs of orthostatic hypotension in standing. Pt presents below functional baseline for mobility due to BP variations. He will benefit from skilled PT while at [facility]. Recommend home with assist once medically stable to d/c.     Time Calculation:   PT Evaluation Complexity  History, PT Evaluation Complexity: 1-2 personal factors and/or comorbidities  Examination of Body Systems (PT Eval Complexity): total of 3 or more elements  Clinical Presentation (PT Evaluation Complexity): stable  Clinical Decision Making (PT Evaluation Complexity): low complexity  Overall Complexity (PT Evaluation Complexity): low complexity     PT Charges       Row Name 12/06/24 0936             Time Calculation    Start Time 0844  -ER      PT Received On 12/06/24  -ER      PT Goal Re-Cert Due Date 12/16/24  -ER         Untimed Charges    PT Eval/Re-eval Minutes 52  -ER         Total Minutes    Untimed Charges Total Minutes 52  -ER       Total Minutes 52  -ER                User Key  (r) = Recorded By, (t) = Taken By, (c) = Cosigned By      Initials Name Provider Type    ER  Cynthia Allison, PT Physical Therapist                  Therapy Charges for Today       Code Description Service Date Service Provider Modifiers Qty    50926617859 HC PT EVAL LOW COMPLEXITY 4 2024 Cynthia Allison, PT GP 1            PT G-Codes  Outcome Measure Options: AM-PAC 6 Clicks Basic Mobility (PT)  AM-PAC 6 Clicks Score (PT): 20  PT Discharge Summary  Anticipated Discharge Disposition (PT): home with assist    Cynthia Allison PT  2024      Electronically signed by Cynthia Allison PT at 24 0937          Occupational Therapy Notes (most recent note)        Coleen Tsai, OT at 24 0846          Patient Name: Darien Camarena  : 1935    MRN: [id number]                              Today's Date: 2024       Admit Date: 2024    Visit Dx:     ICD-10-CM ICD-9-CM   1. Orthostasis  I95.1 458.0   2. Light headedness  R42 780.4   3. Chronic atrial fibrillation with rapid ventricular response  I48.20 427.31   4. Acute on chronic urinary retention  R33.9 788.29     Patient Active Problem List   Diagnosis    Type 2 diabetes mellitus with stage 3 chronic kidney disease, without long-term current use of insulin    Gastroesophageal reflux disease with esophagitis    Hyperlipidemia LDL goal <100    Essential hypertension    Nephrolithiasis    Obstructive sleep apnea syndrome    Permanent atrial fibrillation    Stage 3 chronic kidney disease    Coronary artery disease involving native coronary artery of native heart without angina pectoris    Malignant neoplasm of lower lobe of left lung    H/O: GI bleed    Presence of Watchman left atrial appendage closure device    Orthostatic hypotension    Heart failure with mildly reduced ejection fraction (HFmrEF)    Obesity    Choledocholithiasis    Severe malnutrition    Esophagitis    Encounter for removal of biliary stent    A-fib    Acute on chronic urinary retention     Past Medical History:   Diagnosis Date    Arrhythmia     Atrial fibrillation      Chronic kidney disease     COPD (chronic obstructive pulmonary disease)     Coronary artery disease     Dementia     Diabetes mellitus     doesnt check sugar    E. coli sepsis 06/23/2022    Enlarged prostate without lower urinary tract symptoms (luts) 06/20/2016    Full dentures     GERD (gastroesophageal reflux disease)     Gout     Hearing aid worn     bilat prn    History of colonoscopy 09/12/2012    History of radiation therapy 02/24/2023    SBRT LLL lung    [place] (hard of hearing)     hearing aids prn    Hyperlipidemia     Hypertension     Kidney stone     surgery x1    Lung cancer     STEVEN on CPAP     compliant with machine    PAF (paroxysmal atrial fibrillation)     Prostatism     Urinary incontinence     Urinary tract infection     Wears glasses     readers     Past Surgical History:   Procedure Laterality Date    ATRIAL APPENDAGE EXCLUSION LEFT WITH TRANSESOPHAGEAL ECHOCARDIOGRAM N/A 11/28/2023    Procedure: Atrial Appendage Occlusion;  Surgeon: Anthony Mcdaniel MD;  Location:  [facility];  Service: Cardiovascular;  Laterality: N/A;    CARDIAC CATHETERIZATION Left 09/29/2022    Procedure: Left Heart Cath;  Surgeon: Titus Oliveros IV, MD;  Location:  [facility];  Service: Cardiovascular;  Laterality: Left;    CARDIOVERSION      CATARACT EXTRACTION Bilateral     COLONOSCOPY      CYSTOSCOPY      ENDOSCOPY      possible    ENDOSCOPY N/A 9/5/2024    Procedure: ESOPHAGOGASTRODUODENOSCOPY;  Surgeon: Anthony Arambula MD;  Location:  [facility];  Service: Gastroenterology;  Laterality: N/A;  Esophagus dilated to 18mm with 12mm-mm to 15mm, then 15mm to 18mm balloon.    ENDOSCOPY N/A 10/31/2024    Procedure: ESOPHAGOGASTRODUODENOSCOPY WITH BIOPSY;  Surgeon: Carlos Dior MD;  Location:  [facility];  Service: Gastroenterology;  Laterality: N/A;    ERCP N/A 9/5/2024    Procedure: ENDOSCOPIC RETROGRADE CHOLANGIOPANCREATOGRAPHY;  Surgeon: Anthony Arambula MD;   Location: [facility];  Service: Gastroenterology;  Laterality: N/A;  Sphincterotomy made to ampula of common bile duct (CBD), CBD swept with 9mm-12mm balloon - sludge, stones and purulent appearing drainage extracted. 10x7 french biliary stent depolyed into CBD. ERCP scope removed with balloon intact.    ERCP N/A 10/31/2024    Procedure: ENDOSCOPIC RETROGRADE CHOLANGIOPANCREATOGRAPHY;  Surgeon: Carlos Dior MD;  Location:  [facility];  Service: Gastroenterology;  Laterality: N/A;    KIDNEY STONE SURGERY      x1      General Information       Row Name 12/06/24 1015          OT Time and Intention    Document Type evaluation  -KF     Mode of Treatment occupational therapy  -       Row Name 12/06/24 1015          General Information    Patient Profile Reviewed yes  -KF     Prior Level of Function independent:;all household mobility;community mobility;bed mobility;ADL's;driving;mod assist:;home management;cooking  Independent prior, PRN use of rollator with SPC. Pt's daughters assist in IADLs as needed.  -KF     Existing Precautions/Restrictions fall;orthostatic hypotension;other (see comments)  monitor BP, pt asymptomatic  -KF     Barriers to Rehab medically complex  -       Row Name 12/06/24 1015          Occupational Profile    Environmental Supports and Barriers (Occupational Profile) walk-in shower  -       Row Name 12/06/24 1015          Living Environment    People in Home alone  -       Row Name 12/06/24 1015          Home Main Entrance    Number of Stairs, Main Entrance one  -KF     Stair Railings, Main Entrance none  -KF       Row Name 12/06/24 1015          Stairs Within Home, Primary    Number of Stairs, Within Home, Primary none  -KF       Row Name 12/06/24 1015          Cognition    Orientation Status (Cognition) oriented x 4  -KF       Row Name 12/06/24 1015          Safety Issues/Impairments Affecting Functional Mobility    Safety Issues Affecting Function (Mobility)  awareness of need for assistance;insight into deficits/self-awareness;safety precaution awareness;safety precautions follow-through/compliance;positioning of assistive device  -     Impairments Affecting Function (Mobility) balance;endurance/activity tolerance;strength  -               User Key  (r) = Recorded By, (t) = Taken By, (c) = Cosigned By      Initials Name Provider Type    KF Coleen Tsai OT Occupational Therapist                     Mobility/ADL's       Row Name 12/06/24 1016          Bed Mobility    Bed Mobility supine-sit  -KF     Supine-Sit [place] (Bed Mobility) standby assist  -     Assistive Device (Bed Mobility) bed rails;head of bed elevated  -       Row Name 12/06/24 1016          Transfers    Transfers bed-chair transfer;sit-stand transfer;stand-sit transfer  -     Comment, (Transfers) Cueing provided for hand placement  -       Row Name 12/06/24 1016          Bed-Chair Transfer    Bed-Chair [place] (Transfers) contact guard;verbal cues  -     Assistive Device (Bed-Chair Transfers) walker, front-wheeled  -     Comment, (Bed-Chair Transfer) Pt able to stand and take ~3 steps from the EOB to the bedside chair.  -       Row Name 12/06/24 1016          Sit-Stand Transfer    Sit-Stand [place] (Transfers) contact guard;verbal cues  -     Assistive Device (Sit-Stand Transfers) walker, front-wheeled  -       Row Name 12/06/24 1016          Stand-Sit Transfer    Stand-Sit [place] (Transfers) contact guard;verbal cues  -     Assistive Device (Stand-Sit Transfers) walker, front-wheeled  -       Row Name 12/06/24 1016          Functional Mobility    Functional Mobility- Comment Additional mobility limited by asymptomatic orthostatic hypotension. Vitals recorded below.  -       Row Name 12/06/24 1016          Activities of Daily Living    BADL Assessment/Intervention upper body dressing;feeding  -       Row Name 12/06/24 1016          Upper Body Dressing  Assessment/Training    [place] Level (Upper Body Dressing) don;front opening garment;minimum assist (75% patient effort)  -KF     Position (Upper Body Dressing) edge of bed sitting  -KF       Row Name 12/06/24 1016          Self-Feeding Assessment/Training    [place] Level (Feeding) prepare tray/open items;minimum assist (75% patient effort);liquids to mouth;scoop food and bring to mouth;independent  -KF     Position (Feeding) supported sitting  -KF               User Key  (r) = Recorded By, (t) = Taken By, (c) = Cosigned By      Initials Name Provider Type    KF Coleen Tsai OT Occupational Therapist                   Obj/Interventions       Row Name 12/06/24 1018          Sensory Assessment (Somatosensory)    Sensory Assessment (Somatosensory) UE sensation intact  -       Row Name 12/06/24 1018          Range of Motion Comprehensive    General Range of Motion bilateral upper extremity ROM WFL  -       Row Name 12/06/24 1018          Strength Comprehensive (MMT)    Comment, General Manual Muscle Testing (MMT) Assessment BUE grossly 4+/5  -KF       Row Name 12/06/24 1018          Balance    Balance Assessment sitting static balance;sitting dynamic balance;sit to stand dynamic balance;standing static balance;standing dynamic balance  -KF     Static Sitting Balance standby assist  -KF     Dynamic Sitting Balance standby assist  -KF     Position, Sitting Balance unsupported;sitting edge of bed  -KF     Sit to Stand Dynamic Balance contact guard;verbal cues  -KF     Static Standing Balance contact guard  -KF     Dynamic Standing Balance contact guard  -KF     Position/Device Used, Standing Balance supported;walker, front-wheeled  -KF     Balance Interventions sitting;standing;sit to stand;supported;static;dynamic;occupation based/functional task  -KF               User Key  (r) = Recorded By, (t) = Taken By, (c) = Cosigned By      Initials Name Provider Type    KF Coleen Tsai OT Occupational  Therapist                   Goals/Plan       Row Name 12/06/24 1021          Transfer Goal 1 (OT)    Activity/Assistive Device (Transfer Goal 1, OT) commode;bed-to-chair/chair-to-bed  -KF     [place] Level/Cues Needed (Transfer Goal 1, OT) supervision required  -KF     Time Frame (Transfer Goal 1, OT) long term goal (LTG);10 days  -KF     Progress/Outcome (Transfer Goal 1, OT) goal ongoing  -KF       Row Name 12/06/24 1021          Dressing Goal 1 (OT)    Activity/Device (Dressing Goal 1, OT) upper body dressing;lower body dressing  -KF     [place]/Cues Needed (Dressing Goal 1, OT) standby assist  -KF     Time Frame (Dressing Goal 1, OT) short term goal (STG);5 days  -KF     Progress/Outcome (Dressing Goal 1, OT) goal ongoing  -KF       Row Name 12/06/24 1021          Grooming Goal 1 (OT)    Activity/Device (Grooming Goal 1, OT) hair care;oral care;wash face, hands  -KF     [place] (Grooming Goal 1, OT) supervision required  -KF     Time Frame (Grooming Goal 1, OT) long term goal (LTG);10 days  -KF     Strategies/Barriers (Grooming Goal 1, OT) standing sink side  -KF     Progress/Outcome (Grooming Goal 1, OT) goal ongoing  -KF       Row Name 12/06/24 1021          Therapy Assessment/Plan (OT)    Planned Therapy Interventions (OT) activity tolerance training;adaptive equipment training;BADL retraining;functional balance retraining;occupation/activity based interventions;patient/caregiver education/training;ROM/therapeutic exercise;strengthening exercise;transfer/mobility retraining  -KF               User Key  (r) = Recorded By, (t) = Taken By, (c) = Cosigned By      Initials Name Provider Type    KF Coleen Tsai, [place] Occupational Therapist                   Clinical Impression       Row Name 12/06/24 1019          Pain Assessment    Pretreatment Pain Rating 0/10 - no pain  -KF     Posttreatment Pain Rating 0/10 - no pain  -KF       Row Name 12/06/24 1019          Plan of Care Review    Plan of Care  Reviewed With patient  -KF     Progress no change  -KF     Outcome Evaluation OT evaluation completed. The pt stood and took steps from the EOB to the bedside chair using a RWx with CGA. Additional mobility was limited by orthostatic hypotension, although pt remained asymptomatic. Supine /89, sitting EOB /75, standing BP 85/60, post steps to chair /71. Recommend a d/c home with assist with HHOT when medically ready.  -KF       Row Name 12/06/24 1019          Therapy Assessment/Plan (OT)    Patient/Family Therapy Goal Statement (OT) Restore PLOF  -KF     Rehab Potential (OT) good  -KF     Criteria for Skilled Therapeutic Interventions Met (OT) yes;skilled treatment is necessary  -KF     Therapy Frequency (OT) daily  -KF     Predicted Duration of Therapy Intervention (OT) 7 days  -KF       Row Name 12/06/24 1019          Therapy Plan Review/Discharge Plan (OT)    Anticipated Discharge Disposition (OT) home with assist;home with home health  -KF       Row Name 12/06/24 1019          Vital Signs    Pre Systolic BP Rehab 115  supine  -KF     Pre Treatment Diastolic BP 89  -KF     Intra Systolic BP Rehab 107  sitting  -KF     Intra Treatment Diastolic BP 75  -KF     Post Systolic BP Rehab 85  standing; 104/7 post chair transfer  -KF     Post Treatment Diastolic BP 60  -KF     Pre Patient Position Supine  -KF     Intra Patient Position Standing  -KF     Post Patient Position Sitting  -KF       Row Name 12/06/24 1019          Positioning and Restraints    Pre-Treatment Position in bed  -KF     Post Treatment Position chair  -KF     In Chair notified nsg;reclined;call light within reach;encouraged to call for assist;exit alarm on;waffle cushion;legs elevated  -KF               User Key  (r) = Recorded By, (t) = Taken By, (c) = Cosigned By      Initials Name Provider Type    Coleen Suresh, OT Occupational Therapist                   Outcome Measures       Row Name 12/06/24 1021          How much help  from another is currently needed...    Putting on and taking off regular lower body clothing? 3  -KF     Bathing (including washing, rinsing, and drying) 3  -KF     Toileting (which includes using toilet bed pan or urinal) 3  -KF     Putting on and taking off regular upper body clothing 3  -KF     Taking care of personal grooming (such as brushing teeth) 3  -KF     Eating meals 3  -KF     AM-PAC 6 Clicks Score (OT) 18  -KF       Row Name 12/06/24 0932          How much help from another person do you currently need...    Turning from your back to your side while in flat bed without using bedrails? 3  -ER     Moving from lying on back to sitting on the side of a flat bed without bedrails? 3  -ER     Moving to and from a bed to a chair (including a wheelchair)? 4  -ER     Standing up from a chair using your arms (e.g., wheelchair, bedside chair)? 4  -ER     Climbing 3-5 steps with a railing? 3  -ER     To walk in hospital room? 3  -ER     AM-PAC 6 Clicks Score (PT) 20  -ER     Highest Level of Mobility Goal 6 --> Walk 10 steps or more  -ER       Row Name 12/06/24 1021 12/06/24 0932       Functional Assessment    Outcome Measure Options AM-PAC 6 Clicks Daily Activity (OT)  -KF AM-PAC 6 Clicks Basic Mobility (PT)  -ER              User Key  (r) = Recorded By, (t) = Taken By, (c) = Cosigned By      Initials Name Provider Type    ER Cynthia Allison, PT Physical Therapist    KF Coleen Tsai OT Occupational Therapist                    Occupational Therapy Education       Title: PT OT SLP Therapies (In Progress)       Topic: Occupational Therapy (In Progress)       Point: ADL training (Done)       Description:   Instruct learner(s) on proper safety adaptation and remediation techniques during self care or transfers.   Instruct in proper use of assistive devices.                  Learning Progress Summary            Patient Acceptance, E,TB, VU,DU by  at 12/6/2024 0843                      Point: Home exercise program  (Not Started)       Description:   Instruct learner(s) on appropriate technique for monitoring, assisting and/or progressing therapeutic exercises/activities.                  Learner Progress:  Not documented in this visit.              Point: Precautions (Done)       Description:   Instruct learner(s) on prescribed precautions during self-care and functional transfers.                  Learning Progress Summary            Patient Acceptance, E,TB, VU,DU by  at 12/6/2024 0846                      Point: Body mechanics (Done)       Description:   Instruct learner(s) on proper positioning and spine alignment during self-care, functional mobility activities and/or exercises.                  Learning Progress Summary            Patient Acceptance, E,TB, VU,DU by  at 12/6/2024 0846                                      User Key       Initials Effective Dates Name Provider Type Discipline     08/09/23 -  Coleen Tsai, OT Occupational Therapist OT                  OT Recommendation and Plan  Planned Therapy Interventions (OT): activity tolerance training, adaptive equipment training, BADL retraining, functional balance retraining, occupation/activity based interventions, patient/caregiver education/training, ROM/therapeutic exercise, strengthening exercise, transfer/mobility retraining  Therapy Frequency (OT): daily  Plan of Care Review  Plan of Care Reviewed With: patient  Progress: no change  Outcome Evaluation: OT evaluation completed. The pt stood and took steps from the EOB to the bedside chair using a RWx with CGA. Additional mobility was limited by orthostatic hypotension, although pt remained asymptomatic. Supine /89, sitting EOB /75, standing BP 85/60, post steps to chair /71. Recommend a d/c home with assist with HHOT when medically ready.     Time Calculation:   Evaluation Complexity (OT)  Review Occupational Profile/Medical/Therapy History Complexity: brief/low  complexity  Assessment, Occupational Performance/Identification of Deficit Complexity: 1-3 performance deficits  Clinical Decision Making Complexity (OT): problem focused assessment/low complexity  Overall Complexity of Evaluation (OT): low complexity     Time Calculation- OT       Row Name 12/06/24 1022             Time Calculation- OT    OT Start Time 0846  -KF      OT Received On 12/06/24  -KF      OT Goal Re-Cert Due Date 12/16/24  -KF         Untimed Charges    OT Eval/Re-eval Minutes 48  -KF         Total Minutes    Untimed Charges Total Minutes 48  -KF       Total Minutes 48  -KF                User Key  (r) = Recorded By, (t) = Taken By, (c) = Cosigned By      Initials Name Provider Type    KF Coleen Tsai OT Occupational Therapist                  Therapy Charges for Today       Code Description Service Date Service Provider Modifiers Qty    [id number]  OT EVAL LOW COMPLEXITY 4 12/6/2024 Coleen Tsai OT GO 1                 Coleen Tsai OT  12/6/2024    Electronically signed by Coleen Tsai OT at 12/06/24 1023

## 2024-12-09 ENCOUNTER — APPOINTMENT (OUTPATIENT)
Dept: GENERAL RADIOLOGY | Facility: HOSPITAL | Age: 89
End: 2024-12-09
Payer: MEDICARE

## 2024-12-09 ENCOUNTER — APPOINTMENT (OUTPATIENT)
Dept: CT IMAGING | Facility: HOSPITAL | Age: 89
End: 2024-12-09
Payer: MEDICARE

## 2024-12-09 ENCOUNTER — TELEPHONE (OUTPATIENT)
Dept: UROLOGY | Facility: CLINIC | Age: 89
End: 2024-12-09
Payer: MEDICARE

## 2024-12-09 LAB
ANION GAP SERPL CALCULATED.3IONS-SCNC: 6 MMOL/L (ref 5–15)
BUN SERPL-MCNC: 16 MG/DL (ref 8–23)
BUN/CREAT SERPL: 26.7 (ref 7–25)
CALCIUM SPEC-SCNC: 8.9 MG/DL (ref 8.6–10.5)
CHLORIDE SERPL-SCNC: 105 MMOL/L (ref 98–107)
CO2 SERPL-SCNC: 27 MMOL/L (ref 22–29)
CREAT SERPL-MCNC: 0.6 MG/DL (ref 0.76–1.27)
CRP SERPL-MCNC: 3.55 MG/DL (ref 0–0.5)
DEPRECATED RDW RBC AUTO: 52.5 FL (ref 37–54)
EGFRCR SERPLBLD CKD-EPI 2021: 92.8 ML/MIN/1.73
ERYTHROCYTE [DISTWIDTH] IN BLOOD BY AUTOMATED COUNT: 14.6 % (ref 12.3–15.4)
ERYTHROCYTE [SEDIMENTATION RATE] IN BLOOD: 74 MM/HR (ref 0–20)
GLUCOSE BLDC GLUCOMTR-MCNC: 145 MG/DL (ref 70–130)
GLUCOSE BLDC GLUCOMTR-MCNC: 183 MG/DL (ref 70–130)
GLUCOSE BLDC GLUCOMTR-MCNC: 75 MG/DL (ref 70–130)
GLUCOSE BLDC GLUCOMTR-MCNC: 92 MG/DL (ref 70–130)
GLUCOSE SERPL-MCNC: 97 MG/DL (ref 65–99)
HCT VFR BLD AUTO: 35.2 % (ref 37.5–51)
HGB BLD-MCNC: 11.2 G/DL (ref 13–17.7)
MCH RBC QN AUTO: 31 PG (ref 26.6–33)
MCHC RBC AUTO-ENTMCNC: 31.8 G/DL (ref 31.5–35.7)
MCV RBC AUTO: 97.5 FL (ref 79–97)
PLATELET # BLD AUTO: 159 10*3/MM3 (ref 140–450)
PMV BLD AUTO: 10.8 FL (ref 6–12)
POTASSIUM SERPL-SCNC: 4.4 MMOL/L (ref 3.5–5.2)
RBC # BLD AUTO: 3.61 10*6/MM3 (ref 4.14–5.8)
SODIUM SERPL-SCNC: 138 MMOL/L (ref 136–145)
WBC NRBC COR # BLD AUTO: 8.21 10*3/MM3 (ref 3.4–10.8)

## 2024-12-09 PROCEDURE — 63710000001 INSULIN LISPRO (HUMAN) PER 5 UNITS: Performed by: INTERNAL MEDICINE

## 2024-12-09 PROCEDURE — 85652 RBC SED RATE AUTOMATED: CPT | Performed by: INTERNAL MEDICINE

## 2024-12-09 PROCEDURE — 86140 C-REACTIVE PROTEIN: CPT | Performed by: INTERNAL MEDICINE

## 2024-12-09 PROCEDURE — 25010000002 CEFTRIAXONE PER 250 MG: Performed by: INTERNAL MEDICINE

## 2024-12-09 PROCEDURE — 71260 CT THORAX DX C+: CPT

## 2024-12-09 PROCEDURE — 97530 THERAPEUTIC ACTIVITIES: CPT

## 2024-12-09 PROCEDURE — 99232 SBSQ HOSP IP/OBS MODERATE 35: CPT | Performed by: INTERNAL MEDICINE

## 2024-12-09 PROCEDURE — 85027 COMPLETE CBC AUTOMATED: CPT | Performed by: NURSE PRACTITIONER

## 2024-12-09 PROCEDURE — 82948 REAGENT STRIP/BLOOD GLUCOSE: CPT

## 2024-12-09 PROCEDURE — 97116 GAIT TRAINING THERAPY: CPT

## 2024-12-09 PROCEDURE — 73610 X-RAY EXAM OF ANKLE: CPT

## 2024-12-09 PROCEDURE — 25510000001 IOPAMIDOL 61 % SOLUTION: Performed by: INTERNAL MEDICINE

## 2024-12-09 PROCEDURE — 80048 BASIC METABOLIC PNL TOTAL CA: CPT | Performed by: NURSE PRACTITIONER

## 2024-12-09 RX ORDER — COLCHICINE 0.6 MG/1
1.2 TABLET ORAL ONCE
Status: COMPLETED | OUTPATIENT
Start: 2024-12-09 | End: 2024-12-09

## 2024-12-09 RX ORDER — COLCHICINE 0.6 MG/1
0.6 TABLET ORAL DAILY
Status: DISCONTINUED | OUTPATIENT
Start: 2024-12-09 | End: 2024-12-18 | Stop reason: HOSPADM

## 2024-12-09 RX ORDER — IOPAMIDOL 612 MG/ML
100 INJECTION, SOLUTION INTRAVASCULAR
Status: COMPLETED | OUTPATIENT
Start: 2024-12-09 | End: 2024-12-09

## 2024-12-09 RX ADMIN — COLCHICINE 1.2 MG: 0.6 TABLET ORAL at 11:21

## 2024-12-09 RX ADMIN — Medication 10 ML: at 21:36

## 2024-12-09 RX ADMIN — INSULIN LISPRO 2 UNITS: 100 INJECTION, SOLUTION INTRAVENOUS; SUBCUTANEOUS at 12:48

## 2024-12-09 RX ADMIN — FLUTICASONE PROPIONATE 2 SPRAY: 50 SPRAY, METERED NASAL at 21:36

## 2024-12-09 RX ADMIN — PRAVASTATIN SODIUM 40 MG: 40 TABLET ORAL at 09:00

## 2024-12-09 RX ADMIN — FLUTICASONE PROPIONATE 2 SPRAY: 50 SPRAY, METERED NASAL at 09:01

## 2024-12-09 RX ADMIN — METOPROLOL TARTRATE 50 MG: 50 TABLET, FILM COATED ORAL at 21:36

## 2024-12-09 RX ADMIN — SERTRALINE HYDROCHLORIDE 50 MG: 50 TABLET ORAL at 09:00

## 2024-12-09 RX ADMIN — DONEPEZIL HYDROCHLORIDE 10 MG: 10 TABLET, FILM COATED ORAL at 21:36

## 2024-12-09 RX ADMIN — IOPAMIDOL 85 ML: 612 INJECTION, SOLUTION INTRAVENOUS at 13:59

## 2024-12-09 RX ADMIN — Medication 5 MG: at 21:36

## 2024-12-09 RX ADMIN — MEMANTINE 10 MG: 10 TABLET ORAL at 21:36

## 2024-12-09 RX ADMIN — PANTOPRAZOLE SODIUM 40 MG: 40 TABLET, DELAYED RELEASE ORAL at 06:10

## 2024-12-09 RX ADMIN — BISACODYL 5 MG: 5 TABLET, COATED ORAL at 11:15

## 2024-12-09 RX ADMIN — COLCHICINE 0.6 MG: 0.6 TABLET ORAL at 12:48

## 2024-12-09 RX ADMIN — ASPIRIN 81 MG: 81 TABLET, COATED ORAL at 09:00

## 2024-12-09 RX ADMIN — SODIUM CHLORIDE 2000 MG: 900 INJECTION INTRAVENOUS at 09:00

## 2024-12-09 RX ADMIN — MEMANTINE 10 MG: 10 TABLET ORAL at 09:00

## 2024-12-09 RX ADMIN — Medication 10 ML: at 09:00

## 2024-12-09 RX ADMIN — METOPROLOL TARTRATE 50 MG: 50 TABLET, FILM COATED ORAL at 09:00

## 2024-12-09 RX ADMIN — FINASTERIDE 5 MG: 5 TABLET, FILM COATED ORAL at 09:00

## 2024-12-09 NOTE — PLAN OF CARE
Goal Outcome Evaluation:  Plan of Care Reviewed With: patient        Progress: improving  Outcome Evaluation: Remains Afib on tele. Denies CP or SOA. Slept on and off. VSS. No concerns at this time

## 2024-12-09 NOTE — PROGRESS NOTES
[facility] Medicine Services  PROGRESS NOTE    Patient Name: Darien Camarena  : 1935  MRN: [id number]    Date of Admission: 2024  Primary Care Physician: Pito Way MD    Subjective   Subjective     CC:  Follow-up for weakness    HPI:  Patient seen and examined this morning.  Comfortable in bed.  Now wanting rehab.  Reporting that he is hurting in his right foot since yesterday, particularly with ambulation      Objective   Objective     Vital Signs:   Temp:  [97.4 °F (36.3 °C)-98.3 °F (36.8 °C)] 98 °F (36.7 °C)  Heart Rate:  [101-137] 105  Resp:  [16-18] 17  BP: (113-144)/(82-96) 113/82     Physical Exam:  General: Comfortable, not in distress, conversant and cooperative  Head: Atraumatic and normocephalic  Eyes: No Icterus. No pallor  Ears:  Ears appear intact with no abnormalities noted  Throat: No oral lesions, no thrush  Neck: Supple, trachea midline  Lungs: Clear to auscultation bilaterally, equal air entry, no wheezing or crackles  Heart:  Normal S1 and S2, no murmur, no gallop, No JVD, no lower extremity swelling  Abdomen:  Soft, no tenderness, no organomegaly, normal bowel sounds, no organomegaly  Extremities: pulses equal bilaterally  Skin: No bleeding, bruising or rash, normal skin turgor and elasticity  Neurologic: Cranial nerves appear intact with no evidence of facial asymmetry, normal motor and sensory functions in all 4 extremities  Psych: Alert and oriented x 3, normal mood    Results Reviewed:  LAB RESULTS:      Lab 24  0619 24  0439 24  0430 24  1350 24  1034   WBC 8.21 6.83 6.15  --  9.69   HEMOGLOBIN 11.2* 11.3* 11.3*  --  13.6   HEMATOCRIT 35.2* 36.6* 35.3*  --  43.7   PLATELETS 159 154 149  --  252   NEUTROS ABS  --  4.60 4.15  --  7.39*   IMMATURE GRANS (ABS)  --  0.02 0.01  --  0.03   LYMPHS ABS  --  1.32 1.29  --  1.47   MONOS ABS  --  0.45 0.39  --  0.48   EOS ABS  --  0.39 0.26  --  0.25   MCV 97.5* 99.2*  98.3*  --  100.0*   SED RATE 74*  --   --   --   --    CRP 3.55*  --   --   --   --    PROCALCITONIN  --   --   --   --  0.05   LACTATE  --   --   --  2.9* 3.0*   HSTROP T  --   --   --   --  36*         Lab 12/09/24  0619 12/08/24 0439 12/07/24  0430 12/06/24  0835 12/05/24  1034   SODIUM 138 138 141 138 142   POTASSIUM 4.4 4.2 3.9 4.4 5.0   CHLORIDE 105 106 105 105 100   CO2 27.0 27.0 27.0 27.0 29.0   ANION GAP 6.0 5.0 9.0 6.0 13.0   BUN 16 21 21 31* 40*   CREATININE 0.60* 0.68* 0.66* 0.94 1.19   EGFR 92.8 89.4 90.2 78.0 58.8*   GLUCOSE 97 124* 105* 119* 106*   CALCIUM 8.9 8.5* 8.6 8.6 9.7   MAGNESIUM  --   --  1.7  --  1.7   PHOSPHORUS  --   --  3.1  --   --          Lab 12/08/24  0439 12/07/24  0430 12/05/24  1034   TOTAL PROTEIN 5.7* 5.9* 7.7   ALBUMIN 2.7* 2.8* 3.5   GLOBULIN 3.0 3.1 4.2   ALT (SGPT) 15 12 18   AST (SGOT) 33 25 41*   BILIRUBIN 0.5 0.7 0.9   ALK PHOS 167* 166* 221*         Lab 12/05/24  1034   HSTROP T 36*                 Brief Urine Lab Results  (Last result in the past 365 days)        Color   Clarity   Blood   Leuk Est   Nitrite   Protein   CREAT   Urine HCG        12/05/24 1432 Yellow   Clear   Negative   Moderate (2+)   Positive   Negative                   Microbiology Results Abnormal       Procedure Component Value - Date/Time    Urine Culture - Urine, Urine, Catheter [[id number]]  (Abnormal) Collected: 12/05/24 1432    Lab Status: Final result Specimen: Urine, Catheter Updated: 12/07/24 1028     Urine Culture >100,000 CFU/mL Escherichia coli    Narrative:      Refer to previous urine culture collected on 12/05/2024 1610 for MICs    Colonization of the urinary tract without infection is common. Treatment is discouraged unless the patient is symptomatic, pregnant, or undergoing an invasive urologic procedure.            XR Ankle 3+ View Right    Result Date: 12/9/2024  XR ANKLE 3+ VW RIGHT Date of Exam: 12/9/2024 10:14 AM EST Indication: PAIN Comparison: None available. Findings: Acute  enthesopathy and plantar calcaneal spurring. Vascular calcification. Moderate to severe degenerative changes of the midfoot. Sequela of old medial ankle injury. No asymmetric ankle mortise widening. Minimal lucency along the lateral talar dome. No evidence of acute fracture.     Impression: Impression: 1.No evidence of acute fracture or malalignment. 2.Minimal lucency along the lateral talar dome may represent a small osteochondral lesion. 3.Moderate to severe degenerative changes of the midfoot. Electronically Signed: Alexei Apodaca MD  12/9/2024 10:42 AM EST  Workstation ID: [id number]     Results for orders placed during the hospital encounter of 11/21/24    Adult Transesophageal Echo 3D (FARHAD) W/ Cont If Necessary Per Protocol    Interpretation Summary    There is a 27mm Watchman FLX device in place. It appears well seated and well compressed with no device related thrombus seen. There is a small jet of color flow, 2.5 mm, at the superior aspect adjacent to the left upper pulmonary vein. This was not seen previous examination however image quality is improved compared to prior.    Left ventricular systolic function is moderately decreased. Left ventricular ejection fraction appears to be 36 - 40%. Normal left ventricular wall thickness noted. The left ventricular cavity is dilated. There is left ventricular global hypokinesis noted.    : The right ventricular cavity is mildly dilated. Mildly reduced right ventricular systolic function noted.    : The left atrial cavity is severely dilated. No evidence of a left atrial thrombus present. There is light spontaneous echo contrast present in the atrial body.    The right atrial cavity is severely dilated.    There is mild, bileaflet mitral valve thickening present. Mild mitral valve regurgitation is present. No significant mitral valve stenosis is present.    Mild tricuspid valve regurgitation is present. Estimated right ventricular systolic pressure from tricuspid  regurgitation is mildly elevated (35-45 mmHg).    There is moderate pulmonic valve regurgitation present.      Current medications:  Scheduled Meds:aspirin, 81 mg, Oral, Daily  cefTRIAXone, 2,000 mg, Intravenous, Q24H  colchicine, 0.6 mg, Oral, Daily  donepezil, 10 mg, Oral, Nightly  finasteride, 5 mg, Oral, Daily  fluticasone, 2 spray, Each Nare, BID  insulin lispro, 2-7 Units, Subcutaneous, 4x Daily AC & at Bedtime  memantine, 10 mg, Oral, BID  metoprolol tartrate, 50 mg, Oral, Q12H  pantoprazole, 40 mg, Oral, Q AM  pravastatin, 40 mg, Oral, Daily  sertraline, 50 mg, Oral, Daily  sodium chloride, 10 mL, Intravenous, Q12H      Continuous Infusions:     PRN Meds:.  Albuterol Sulfate NEB Orderable    senna-docusate sodium **AND** polyethylene glycol **AND** bisacodyl **AND** bisacodyl    Calcium Replacement - Follow Nurse / BPA Driven Protocol    dextrose    dextrose    glucagon (human recombinant)    Magnesium Standard Dose Replacement - Follow Nurse / BPA Driven Protocol    ondansetron    Phosphorus Replacement - Follow Nurse / BPA Driven Protocol    Potassium Replacement - Follow Nurse / BPA Driven Protocol    sodium chloride    sodium chloride    sodium chloride    Assessment & Plan   Assessment & Plan     Active Hospital Problems    Diagnosis  POA    **Orthostatic hypotension [I95.1]  Yes    Acute on chronic urinary retention [R33.9]  Yes    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 male with history of chronic atrial fibrillation s/p watchman, CKD stage III, chronic urinary retention with self caths, HFmr EF, type 2 diabetes, hypertension hyperlipidemia.  Patient sent from urology  clinic with complaints of dizziness and orthostatic hypotension    Acute UTI, POA  Chronic urine retention with severe left hydronephrosis, improved  Patient with known history of chronic retention.  He self cath 4 times a day.  Have been having difficulty self cathing likely secondary to developing urethral stricture  Seen at urology clinic and sent to the hospital because of hypotension  UA is concerning for UTI.  Started on IV Rocephin.  Urine culture growing gram-negative bacilli.  Awaiting speciation particularly with his previous history of ESBL   Urology following  Had a Del Cid catheter placed per urology recs.  Repeat CT abdomen after Del Cid catheter placement showed complete resolution of the severe left hydronephrosis.  Dr. Larkin /urology recommended to discharge the patient on Del Cid catheter and follow-up with him in the clinic in 2 weeks    Orthostatic hypotension, resolved  Likely secondary to poor oral intake over the last few days   Improved with IV fluids  Metoprolol and Lasix held    HFmrEF, compensated  Paroxysmal A-fib  Echo from 11/21 with EF 35-to 40%  Recently had a recent increase in Lasix due to lower extremity edema.  Lasix currently on hold because of hypotension  Continue metoprolol and amiodarone for rate control   not on anticoagulation.  Status post Watchman device.  Continue aspirin alone  Appreciate help from cardiology team    Right foot pain, likely secondary to gout flare  CRP and segmentation rate are elevated  X-ray with advanced arthritis but no other acute findings or fractures  Start colchicine.  Plan to treat for 2 weeks    CKD stage III  Creatinine stable at baseline.  Continue to monitor     Well-controlled type 2 diabetes   A1c 5.9%  Continue SSI      Dementia and depression  Continue Aricept and Namenda  Continue Zoloft     Chronic weakness  Case management consulted for short-term rehab      Expected Discharge Location and Transportation: SNF  Expected Discharge    Expected Discharge Date: 12/11/2024; Expected Discharge Time:      VTE Prophylaxis:  Mechanical VTE prophylaxis orders are present.         AM-PAC 6 Clicks Score (PT): 20 (12/08/24 2015)    CODE STATUS:   There are no questions and answers to display.       Clarence Samaniego MD  12/09/24

## 2024-12-09 NOTE — THERAPY TREATMENT NOTE
Patient Name: Darien Camarena  : 1935    MRN: [id number]                              Today's Date: 2024       Admit Date: 2024    Visit Dx:     ICD-10-CM ICD-9-CM   1. Orthostasis  I95.1 458.0   2. Light headedness  R42 780.4   3. Chronic atrial fibrillation with rapid ventricular response  I48.20 427.31   4. Acute on chronic urinary retention  R33.9 788.29     Patient Active Problem List   Diagnosis    Type 2 diabetes mellitus with stage 3 chronic kidney disease, without long-term current use of insulin    Gastroesophageal reflux disease with esophagitis    Hyperlipidemia LDL goal <100    Essential hypertension    Nephrolithiasis    Obstructive sleep apnea syndrome    Permanent atrial fibrillation    Stage 3 chronic kidney disease    Coronary artery disease involving native coronary artery of native heart without angina pectoris    Malignant neoplasm of lower lobe of left lung    H/O: GI bleed    Presence of Watchman left atrial appendage closure device    Orthostatic hypotension    Heart failure with mildly reduced ejection fraction (HFmrEF)    Obesity    Choledocholithiasis    Severe malnutrition    Esophagitis    Encounter for removal of biliary stent    Acute on chronic urinary retention     Past Medical History:   Diagnosis Date    Arrhythmia     Atrial fibrillation     Chronic kidney disease     COPD (chronic obstructive pulmonary disease)     Coronary artery disease     Dementia     Diabetes mellitus     doesnt check sugar    E. coli sepsis 2022    Enlarged prostate without lower urinary tract symptoms (luts) 2016    Full dentures     GERD (gastroesophageal reflux disease)     Gout     Hearing aid worn     bilat prn    History of colonoscopy 2012    History of radiation therapy 2023    SBRT LLL lung    [place] (hard of hearing)     hearing aids prn    Hyperlipidemia     Hypertension     Kidney stone     surgery x1    Lung cancer     STEVEN on CPAP     compliant with  machine    PAF (paroxysmal atrial fibrillation)     Prostatism     Urinary incontinence     Urinary tract infection     Wears glasses     readers     Past Surgical History:   Procedure Laterality Date    ATRIAL APPENDAGE EXCLUSION LEFT WITH TRANSESOPHAGEAL ECHOCARDIOGRAM N/A 11/28/2023    Procedure: Atrial Appendage Occlusion;  Surgeon: Anthony Mcdaniel MD;  Location: [facility] INVASIVE LOCATION;  Service: Cardiovascular;  Laterality: N/A;    CARDIAC CATHETERIZATION Left 09/29/2022    Procedure: Left Heart Cath;  Surgeon: Titus Oliveros IV, MD;  Location:  [facility]ASIVE LOCATION;  Service: Cardiovascular;  Laterality: Left;    CARDIOVERSION      CATARACT EXTRACTION Bilateral     COLONOSCOPY      CYSTOSCOPY      ENDOSCOPY      possible    ENDOSCOPY N/A 9/5/2024    Procedure: ESOPHAGOGASTRODUODENOSCOPY;  Surgeon: Anthony Arambula MD;  Location:  [facility];  Service: Gastroenterology;  Laterality: N/A;  Esophagus dilated to 18mm with 12mm-mm to 15mm, then 15mm to 18mm balloon.    ENDOSCOPY N/A 10/31/2024    Procedure: ESOPHAGOGASTRODUODENOSCOPY WITH BIOPSY;  Surgeon: Carlos Dior MD;  Location:  [facility];  Service: Gastroenterology;  Laterality: N/A;    ERCP N/A 9/5/2024    Procedure: ENDOSCOPIC RETROGRADE CHOLANGIOPANCREATOGRAPHY;  Surgeon: Anthony Arambula MD;  Location:  [facility];  Service: Gastroenterology;  Laterality: N/A;  Sphincterotomy made to ampula of common bile duct (CBD), CBD swept with 9mm-12mm balloon - sludge, stones and purulent appearing drainage extracted. 10x7 Welsh biliary stent depolyed into CBD. ERCP scope removed with balloon intact.    ERCP N/A 10/31/2024    Procedure: ENDOSCOPIC RETROGRADE CHOLANGIOPANCREATOGRAPHY;  Surgeon: Carlos Dior MD;  Location:  [facility];  Service: Gastroenterology;  Laterality: N/A;    KIDNEY STONE SURGERY      x1      General Information       Row Name 12/09/24 1513          Physical Therapy Time  and Intention    Document Type therapy note (daily note)  -NS     Mode of Treatment individual therapy;physical therapy  -NS       Row Name 12/09/24 1517          General Information    Patient Profile Reviewed yes  -NS     Existing Precautions/Restrictions fall  hx of orthostatic hypotension  -NS       Row Name 12/09/24 1517          Cognition    Orientation Status (Cognition) oriented x 4  -NS       Row Name 12/09/24 1517          Safety Issues/Impairments Affecting Functional Mobility    Safety Issues Affecting Function (Mobility) safety precaution awareness;safety precautions follow-through/compliance;sequencing abilities  -NS     Impairments Affecting Function (Mobility) balance;endurance/activity tolerance;strength;motor planning;pain;shortness of breath  -NS               User Key  (r) = Recorded By, (t) = Taken By, (c) = Cosigned By      Initials Name Provider Type    NS Arlin Mosquera PT Physical Therapist                   Mobility       Row Name 12/09/24 1517          Bed Mobility    Bed Mobility supine-sit  -NS     Supine-Sit [place] (Bed Mobility) standby assist  -NS     Assistive Device (Bed Mobility) bed rails;head of bed elevated  -NS       Row Name 12/09/24 1517          Transfers    Comment, (Transfers) Cues for hand placement first stand, appropriate hand placement on remainder of stands  -NS       Row Name 12/09/24 1517          Bed-Chair Transfer    Bed-Chair [place] (Transfers) contact guard;verbal cues  -NS     Assistive Device (Bed-Chair Transfers) walker, front-wheeled  -NS       [facility] Name 12/09/24 1517          Sit-Stand Transfer    Sit-Stand [place] (Transfers) contact guard;verbal cues  -NS     Assistive Device (Sit-Stand Transfers) walker, front-wheeled  -NS       [facility] Name 12/09/24 1517          Gait/Stairs (Locomotion)    [place] Level (Gait) contact guard  -NS     Assistive Device (Gait) walker, front-wheeled  -NS     Patient was able to Ambulate yes  -NS      Distance in Feet (Gait) 30  -NS     Deviations/Abnormal Patterns (Gait) marilyn decreased;gait speed decreased;stride length decreased  -NS     Bilateral Gait Deviations forward flexed posture;heel strike decreased  -NS     Comment, (Gait/Stairs) Patient ambulated with step-through pattern, demonstrating narrow YAMILET and flexed posture. He took a standing rest break following 30ft, then ambulated an additional 25ft. Cues needed for safety with turns and safe chair approach.  -NS               User Key  (r) = Recorded By, (t) = Taken By, (c) = Cosigned By      Initials Name Provider Type    Arlin Mirza PT Physical Therapist                   Obj/Interventions       Row Name 12/09/24 1517          Motor Skills    Therapeutic Exercise hip  STS 2x5  -NS       Row Name 12/09/24 1517          Hip (Therapeutic Exercise)    Hip (Therapeutic Exercise) strengthening exercise  -NS     Hip Strengthening (Therapeutic Exercise) bilateral;marching while standing;10 repetitions  -NS       Row Name 12/09/24 1517          Balance    Balance Assessment sitting static balance;sitting dynamic balance;standing static balance;standing dynamic balance  -NS     Static Sitting Balance standby assist  -NS     Dynamic Sitting Balance standby assist  -NS     Position, Sitting Balance unsupported;sitting edge of bed  -NS     Static Standing Balance contact guard  -NS     Dynamic Standing Balance contact guard  -NS     Position/Device Used, Standing Balance supported;walker, front-wheeled  -NS               User Key  (r) = Recorded By, (t) = Taken By, (c) = Cosigned By      Initials Name Provider Type    Arlin Mirza PT Physical Therapist                   Goals/Plan    No documentation.                  Clinical Impression       Row Name 12/09/24 1517          Pain    Pretreatment Pain Rating 0/10 - no pain  -NS     Posttreatment Pain Rating 0/10 - no pain  -NS     Pre/Posttreatment Pain Comment Pt reported having some discomfort in his  R ankle upon arrival, reported it was not bad enough to rate on pain scale. Reports improvement following ambulation.  -NS       Row Name 12/09/24 1517          Plan of Care Review    Plan of Care Reviewed With patient  -NS     Progress improving  -NS     Outcome Evaluation Patient demonstrated improved activity tolerance but continues to be limited by decreased functional endurance, mild weakness, and balance deficits. He gave good effort towards mobility training and strengthening. Will continue to progress as tolerated. Recommend IRF at d/c.  -NS       Row Name 12/09/24 1517          Vital Signs    Pre Systolic BP Rehab 121  -NS     Pre Treatment Diastolic BP 88  -NS     Pretreatment Heart Rate (beats/min) 106  -NS     Intratreatment Heart Rate (beats/min) 133   -NS     Posttreatment Heart Rate (beats/min) 95  -NS     Pre SpO2 (%) 94  -NS     O2 Delivery Pre Treatment room air  -NS     Post SpO2 (%) 94  -NS     O2 Delivery Post Treatment room air  -NS     Pre Patient Position Supine  -NS     Intra Patient Position Standing  -NS     Post Patient Position Sitting  -NS       Row Name 12/09/24 1517          Positioning and Restraints    Pre-Treatment Position in bed  -NS     Post Treatment Position chair  -NS     In Chair notified nsg;reclined;call light within reach;encouraged to call for assist;exit alarm on;waffle cushion;legs elevated;heels elevated  -NS               User Key  (r) = Recorded By, (t) = Taken By, (c) = Cosigned By      Initials Name Provider Type    Arlin Mirza PT Physical Therapist                   Outcome Measures       Row Name 12/09/24 1517          How much help from another person do you currently need...    Turning from your back to your side while in flat bed without using bedrails? 3  -NS     Moving from lying on back to sitting on the side of a flat bed without bedrails? 3  -NS     Moving to and from a bed to a chair (including a wheelchair)? 3  -NS     Standing up from a chair  using your arms (e.g., wheelchair, bedside chair)? 3  -NS     Climbing 3-5 steps with a railing? 3  -NS     To walk in hospital room? 3  -NS     AM-PAC 6 Clicks Score (PT) 18  -NS     Highest Level of Mobility Goal 6 --> Walk 10 steps or more  -NS       Row Name 12/09/24 1517          Functional Assessment    Outcome Measure Options AM-PAC 6 Clicks Basic Mobility (PT)  -NS               User Key  (r) = Recorded By, (t) = Taken By, (c) = Cosigned By      Initials Name Provider Type    Arlin Mirza PT Physical Therapist                                 Physical Therapy Education       Title: PT OT SLP Therapies (In Progress)       Topic: Physical Therapy (Done)       Point: Mobility training (Done)       Learning Progress Summary            Patient Acceptance, E, VU by NS at 12/9/2024 1616    Comment: PT POC, discharge options    Acceptance, E, VU by ER at 12/6/2024 0934    Comment: making sure he gets enough fluids in/absorption to help orthostatics, transfer safety, d/c planning                      Point: Home exercise program (Done)       Learning Progress Summary            Patient Acceptance, E, VU by NS at 12/9/2024 1616    Comment: PT POC, discharge options    Acceptance, E, VU by ER at 12/6/2024 0934    Comment: making sure he gets enough fluids in/absorption to help orthostatics, transfer safety, d/c planning                      Point: Body mechanics (Done)       Learning Progress Summary            Patient Acceptance, E, VU by NS at 12/9/2024 1616    Comment: PT POC, discharge options    Acceptance, E, VU by ER at 12/6/2024 0934    Comment: making sure he gets enough fluids in/absorption to help orthostatics, transfer safety, d/c planning                      Point: Precautions (Done)       Learning Progress Summary            Patient Acceptance, E, VU by NS at 12/9/2024 1616    Comment: PT POC, discharge options    Acceptance, E, VU by ER at 12/6/2024 0934    Comment: making sure he gets enough fluids  in/absorption to help orthostatics, transfer safety, d/c planning                                      User Key       Initials Effective Dates Name Provider Type Discipline    NS 06/16/21 -  Arlin Mosquera, PT Physical Therapist PT    ER 11/04/24 -  Cynthia Allison PT Physical Therapist PT                  PT Recommendation and Plan     Progress: improving  Outcome Evaluation: Patient demonstrated improved activity tolerance but continues to be limited by decreased functional endurance, mild weakness, and balance deficits. He gave good effort towards mobility training and strengthening. Will continue to progress as tolerated. Recommend IRF at d/c.     Time Calculation:         PT Charges       Row Name 12/09/24 1517             Time Calculation    Start Time 1517  -NS      PT Received On 12/09/24  -NS      PT Goal Re-Cert Due Date 12/16/24  -NS         Timed Charges    04014 - Gait Training Minutes  14  -NS      90919 - PT Therapeutic Activity Minutes 15  -NS         Total Minutes    Timed Charges Total Minutes 29  -NS       Total Minutes 29  -NS                User Key  (r) = Recorded By, (t) = Taken By, (c) = Cosigned By      Initials Name Provider Type    NS Arlin Mosquera, PT Physical Therapist                  Therapy Charges for Today       Code Description Service Date Service Provider Modifiers Qty    03720323105 HC GAIT TRAINING EA 15 MIN 12/9/2024 Arlin Mosquera, PT GP 1    35547872226 HC PT THERAPEUTIC ACT EA 15 MIN 12/9/2024 Arlin Mosquera, PT GP 1    75905897594 HC PT THER SUPP EA 15 MIN 12/9/2024 Arlin Mosquera, PT GP 2            PT G-Codes  Outcome Measure Options: AM-PAC 6 Clicks Basic Mobility (PT)  AM-PAC 6 Clicks Score (PT): 18  AM-PAC 6 Clicks Score (OT): 18  PT Discharge Summary  Anticipated Discharge Disposition (PT): inpatient rehabilitation facility    Arlin Mosquera PT  12/9/2024

## 2024-12-09 NOTE — PLAN OF CARE
Goal Outcome Evaluation:  Plan of Care Reviewed With: patient        Progress: improving  Outcome Evaluation: VSS. Pt remains on RA with no signs of SOA. Pt remains A. Fib on the monitor. Pt complained of pain in his right foot scheduled meds given.

## 2024-12-09 NOTE — TELEPHONE ENCOUNTER
----- Message from Kristy Guerra sent at 12/6/2024  2:02 PM EST -----  Hi ladies! Can we please get Mr. Camarena a follow up appt with Dr. Larkin in 2 weeks?

## 2024-12-09 NOTE — CASE MANAGEMENT/SOCIAL WORK
Continued Stay Note  [facility]     Patient Name: Darien Camarena  MRN: [id number]  Today's Date: 12/9/2024    Admit Date: 12/5/2024    Plan: Rehab   Discharge Plan       Row Name 12/09/24 1605       Plan    Plan Rehab    Plan Comments Per discussion in MDR, Pt c/o R ankle pain. Colchicine added. He is on room air. PT states he walked 55ft today. I spoke with Pt, in room, and daughter, Nikki, via telephone. I explained insurance is hesitant to approve rehab for patients ambulating > 40ft. Pt and daughter want to continue with rehab placement. Referrals pending with [facility] (left message for Pito) and [facility] (left message for Dodie). CM will continue to follow for medical readiness.    Final Discharge Disposition Code 03 - skilled nursing facility (SNF)                   Discharge Codes    No documentation.                 Expected Discharge Date and Time       Expected Discharge Date Expected Discharge Time    Dec 11, 2024               Pat Lake RN

## 2024-12-09 NOTE — PLAN OF CARE
Goal Outcome Evaluation:  Plan of Care Reviewed With: patient        Progress: improving  Outcome Evaluation: Patient demonstrated improved activity tolerance but continues to be limited by decreased functional endurance, mild weakness, and balance deficits. He gave good effort towards mobility training and strengthening. Will continue to progress as tolerated. Recommend IRF at d/c.    Anticipated Discharge Disposition (PT): inpatient rehabilitation facility

## 2024-12-10 LAB
BACTERIA SPEC AEROBE CULT: NORMAL
BACTERIA SPEC AEROBE CULT: NORMAL
GLUCOSE BLDC GLUCOMTR-MCNC: 116 MG/DL (ref 70–130)
GLUCOSE BLDC GLUCOMTR-MCNC: 121 MG/DL (ref 70–130)
GLUCOSE BLDC GLUCOMTR-MCNC: 150 MG/DL (ref 70–130)
GLUCOSE BLDC GLUCOMTR-MCNC: 182 MG/DL (ref 70–130)

## 2024-12-10 PROCEDURE — 25010000002 CEFTRIAXONE PER 250 MG: Performed by: INTERNAL MEDICINE

## 2024-12-10 PROCEDURE — 82948 REAGENT STRIP/BLOOD GLUCOSE: CPT

## 2024-12-10 PROCEDURE — 63710000001 INSULIN LISPRO (HUMAN) PER 5 UNITS: Performed by: INTERNAL MEDICINE

## 2024-12-10 PROCEDURE — 99232 SBSQ HOSP IP/OBS MODERATE 35: CPT

## 2024-12-10 PROCEDURE — 25010000002 ENOXAPARIN PER 10 MG

## 2024-12-10 RX ORDER — ENOXAPARIN SODIUM 100 MG/ML
40 INJECTION SUBCUTANEOUS NIGHTLY
Status: DISCONTINUED | OUTPATIENT
Start: 2024-12-10 | End: 2024-12-18 | Stop reason: HOSPADM

## 2024-12-10 RX ORDER — ENOXAPARIN SODIUM 100 MG/ML
1 INJECTION SUBCUTANEOUS NIGHTLY
Status: DISCONTINUED | OUTPATIENT
Start: 2024-12-10 | End: 2024-12-10

## 2024-12-10 RX ADMIN — SODIUM CHLORIDE 2000 MG: 900 INJECTION INTRAVENOUS at 08:55

## 2024-12-10 RX ADMIN — PANTOPRAZOLE SODIUM 40 MG: 40 TABLET, DELAYED RELEASE ORAL at 05:25

## 2024-12-10 RX ADMIN — FLUTICASONE PROPIONATE 2 SPRAY: 50 SPRAY, METERED NASAL at 21:35

## 2024-12-10 RX ADMIN — METOPROLOL TARTRATE 50 MG: 50 TABLET, FILM COATED ORAL at 08:55

## 2024-12-10 RX ADMIN — SERTRALINE HYDROCHLORIDE 50 MG: 50 TABLET ORAL at 08:54

## 2024-12-10 RX ADMIN — ASPIRIN 81 MG: 81 TABLET, COATED ORAL at 08:54

## 2024-12-10 RX ADMIN — METOPROLOL TARTRATE 50 MG: 50 TABLET, FILM COATED ORAL at 21:33

## 2024-12-10 RX ADMIN — INSULIN LISPRO 2 UNITS: 100 INJECTION, SOLUTION INTRAVENOUS; SUBCUTANEOUS at 18:02

## 2024-12-10 RX ADMIN — Medication 10 ML: at 08:55

## 2024-12-10 RX ADMIN — PRAVASTATIN SODIUM 40 MG: 40 TABLET ORAL at 08:55

## 2024-12-10 RX ADMIN — MEMANTINE 10 MG: 10 TABLET ORAL at 08:55

## 2024-12-10 RX ADMIN — ENOXAPARIN SODIUM 40 MG: 100 INJECTION SUBCUTANEOUS at 21:33

## 2024-12-10 RX ADMIN — Medication 10 ML: at 21:33

## 2024-12-10 RX ADMIN — COLCHICINE 0.6 MG: 0.6 TABLET ORAL at 09:01

## 2024-12-10 RX ADMIN — FLUTICASONE PROPIONATE 2 SPRAY: 50 SPRAY, METERED NASAL at 08:54

## 2024-12-10 RX ADMIN — MEMANTINE 10 MG: 10 TABLET ORAL at 21:33

## 2024-12-10 RX ADMIN — FINASTERIDE 5 MG: 5 TABLET, FILM COATED ORAL at 08:55

## 2024-12-10 RX ADMIN — DONEPEZIL HYDROCHLORIDE 10 MG: 10 TABLET, FILM COATED ORAL at 21:33

## 2024-12-10 NOTE — PLAN OF CARE
Goal Outcome Evaluation:  Plan of Care Reviewed With: patient        Progress: improving  Outcome Evaluation: Pt c/o of difficulty sleeping. Provider notified. Melatonin given and effective. No acute events overnight. Remains Afib on tele. No c/o CP or SOA. VSS. Cont current POC

## 2024-12-10 NOTE — PLAN OF CARE
Goal Outcome Evaluation:  Plan of Care Reviewed With: patient        Progress: no change  Outcome Evaluation: VSS. Pt remains on RA with no signs of SOA. Pt remains A. fib ont he monitor. No pain noted.

## 2024-12-10 NOTE — CASE MANAGEMENT/SOCIAL WORK
Continued Stay Note  [facility]     Patient Name: Darien Camarena  MRN: [id number]  Today's Date: 12/10/2024    Admit Date: 12/5/2024    Plan: Rehab   Discharge Plan       Row Name 12/10/24 1139       Plan    Plan Rehab    Plan Comments Per discussion in MDR, Colchicine helped R ankle pain. He is in AFib and on room air. Referrals pending (left message for Pito with [facility] and for Dodie with [facility]). CM will continue to follow.    *Addendum: With Pt's permission, CM spoke with Nikki, Pt's daughter, via telephone. I explained I have left messages yesterday and today for [facility] and [facility] but have not received any calls back. I explained that usually means the facilities have no open beds. At her request, referrals were called to April with [facility] and Kimberly with [facility].     Referral packet faxed to Raudel castillo/supporting documents 12/10 @ 7123.    Final Discharge Disposition Code 03 - skilled nursing facility (SNF)                     Discharge Codes    No documentation.                 Expected Discharge Date and Time       Expected Discharge Date Expected Discharge Time    Dec 11, 2024               Pat Lake RN

## 2024-12-10 NOTE — PROGRESS NOTES
[facility] Medicine Services  PROGRESS NOTE    Patient Name: Darien Camarena  : 1935  MRN: [id number]    Date of Admission: 2024  Primary Care Physician: Pito Way MD    Subjective   Subjective     CC:  F/u weakness    HPI:  Patient reports his right foot pain has improved since yesterday. No acute complaints. No family at bedside.      Objective   Objective     Vital Signs:   Temp:  [97.4 °F (36.3 °C)-98.3 °F (36.8 °C)] 97.7 °F (36.5 °C)  Heart Rate:  [] 93  Resp:  [18] 18  BP: (118-132)/(75-96) 118/96     Physical Exam:  Constitutional: Elderly appearing male sitting up in bed in NAD  HENT: NCAT, mucous membranes moist  Respiratory: Clear to auscultation bilaterally, nonlabored respirations on room air  Cardiovascular: IRIR, no murmurs, rubs, or gallops, no bilateral ankle edema  Gastrointestinal: Positive bowel sounds, soft, nontender, nondistended  Musculoskeletal: PETERS spontaneously  Psychiatric: Appropriate affect, cooperative  Neurologic: Oriented x 3, speech clear  Skin: No rashes on exposed surfaces    Results Reviewed:  LAB RESULTS:      Lab 24  0619 24  0439 24  0430 24  1350 24  1034   WBC 8.21 6.83 6.15  --  9.69   HEMOGLOBIN 11.2* 11.3* 11.3*  --  13.6   HEMATOCRIT 35.2* 36.6* 35.3*  --  43.7   PLATELETS 159 154 149  --  252   NEUTROS ABS  --  4.60 4.15  --  7.39*   IMMATURE GRANS (ABS)  --  0.02 0.01  --  0.03   LYMPHS ABS  --  1.32 1.29  --  1.47   MONOS ABS  --  0.45 0.39  --  0.48   EOS ABS  --  0.39 0.26  --  0.25   MCV 97.5* 99.2* 98.3*  --  100.0*   SED RATE 74*  --   --   --   --    CRP 3.55*  --   --   --   --    PROCALCITONIN  --   --   --   --  0.05   LACTATE  --   --   --  2.9* 3.0*   HSTROP T  --   --   --   --  36*         Lab 24  0619 24  0439 24  0430 24  0835 24  1034   SODIUM 138 138 141 138 142   POTASSIUM 4.4 4.2 3.9 4.4 5.0   CHLORIDE 105 106 105 105 100   CO2  27.0 27.0 27.0 27.0 29.0   ANION GAP 6.0 5.0 9.0 6.0 13.0   BUN 16 21 21 31* 40*   CREATININE 0.60* 0.68* 0.66* 0.94 1.19   EGFR 92.8 89.4 90.2 78.0 58.8*   GLUCOSE 97 124* 105* 119* 106*   CALCIUM 8.9 8.5* 8.6 8.6 9.7   MAGNESIUM  --   --  1.7  --  1.7   PHOSPHORUS  --   --  3.1  --   --          Lab 12/08/24  0439 12/07/24  0430 12/05/24  1034   TOTAL PROTEIN 5.7* 5.9* 7.7   ALBUMIN 2.7* 2.8* 3.5   GLOBULIN 3.0 3.1 4.2   ALT (SGPT) 15 12 18   AST (SGOT) 33 25 41*   BILIRUBIN 0.5 0.7 0.9   ALK PHOS 167* 166* 221*         Lab 12/05/24  1034   HSTROP T 36*                 Brief Urine Lab Results  (Last result in the past 365 days)        Color   Clarity   Blood   Leuk Est   Nitrite   Protein   CREAT   Urine HCG        12/05/24 1432 Yellow   Clear   Negative   Moderate (2+)   Positive   Negative                   Microbiology Results Abnormal       Procedure Component Value - Date/Time    Urine Culture - Urine, Urine, Catheter [[id number]]  (Abnormal) Collected: 12/05/24 1432    Lab Status: Final result Specimen: Urine, Catheter Updated: 12/07/24 1028     Urine Culture >100,000 CFU/mL Escherichia coli    Narrative:      Refer to previous urine culture collected on 12/05/2024 1610 for MICs    Colonization of the urinary tract without infection is common. Treatment is discouraged unless the patient is symptomatic, pregnant, or undergoing an invasive urologic procedure.            CT Chest With Contrast Diagnostic    Result Date: 12/9/2024  CT CHEST W CONTRAST DIAGNOSTIC Date of Exam: 12/9/2024 1:56 PM EST Indication: f/u lung nodule. Comparison: Chest radiograph from December 5, 2024 and CT chest from September 4, 2024 Technique: Axial CT images were obtained of the chest after the uneventful intravenous administration of 85 mL Isovue-300.  Reconstructed coronal and sagittal images were also obtained. Automated exposure control and iterative construction methods were used. Findings: There is debris within the left  mainstem bronchus. There is a moderate left pleural effusion with left basilar atelectasis obscures the region of the previously identified left lower lobe nodule. There is a small right pleural effusion with right basilar  atelectasis. No new pulmonary nodule is identified. The heart size appears normal. The great vessels are normal in caliber. No abnormally enlarged lymph nodes are identified. Partial evaluation of the upper abdomen demonstrates left renal cyst. No aggressive osseous lesions are identified.     Impression: Impression: 1.Moderate left pleural effusion with left basilar atelectasis obscures the region of the previously identified left lower lobe nodule. Recommend follow-up CT chest in 3 months. 2.Small right pleural effusion with right basilar atelectasis. Electronically Signed: Renny Good MD  12/9/2024 2:51 PM EST  Workstation ID: [id number]    XR Ankle 3+ View Right    Result Date: 12/9/2024  XR ANKLE 3+ VW RIGHT Date of Exam: 12/9/2024 10:14 AM EST Indication: PAIN Comparison: None available. Findings: Acute enthesopathy and plantar calcaneal spurring. Vascular calcification. Moderate to severe degenerative changes of the midfoot. Sequela of old medial ankle injury. No asymmetric ankle mortise widening. Minimal lucency along the lateral talar dome. No evidence of acute fracture.     Impression: Impression: 1.No evidence of acute fracture or malalignment. 2.Minimal lucency along the lateral talar dome may represent a small osteochondral lesion. 3.Moderate to severe degenerative changes of the midfoot. Electronically Signed: Alexei Apodaca MD  12/9/2024 10:42 AM EST  Workstation ID: [id number]     Results for orders placed during the hospital encounter of 11/21/24    Adult Transesophageal Echo 3D (FARHAD) W/ Cont If Necessary Per Protocol    Interpretation Summary    There is a 27mm Watchman FLX device in place. It appears well seated and well compressed with no device related thrombus seen.  There is a small jet of color flow, 2.5 mm, at the superior aspect adjacent to the left upper pulmonary vein. This was not seen previous examination however image quality is improved compared to prior.    Left ventricular systolic function is moderately decreased. Left ventricular ejection fraction appears to be 36 - 40%. Normal left ventricular wall thickness noted. The left ventricular cavity is dilated. There is left ventricular global hypokinesis noted.    : The right ventricular cavity is mildly dilated. Mildly reduced right ventricular systolic function noted.    : The left atrial cavity is severely dilated. No evidence of a left atrial thrombus present. There is light spontaneous echo contrast present in the atrial body.    The right atrial cavity is severely dilated.    There is mild, bileaflet mitral valve thickening present. Mild mitral valve regurgitation is present. No significant mitral valve stenosis is present.    Mild tricuspid valve regurgitation is present. Estimated right ventricular systolic pressure from tricuspid regurgitation is mildly elevated (35-45 mmHg).    There is moderate pulmonic valve regurgitation present.      Current medications:  Scheduled Meds:aspirin, 81 mg, Oral, Daily  cefTRIAXone, 2,000 mg, Intravenous, Q24H  colchicine, 0.6 mg, Oral, Daily  donepezil, 10 mg, Oral, Nightly  enoxaparin, 40 mg, Subcutaneous, Nightly  finasteride, 5 mg, Oral, Daily  fluticasone, 2 spray, Each Nare, BID  insulin lispro, 2-7 Units, Subcutaneous, 4x Daily AC & at Bedtime  memantine, 10 mg, Oral, BID  metoprolol tartrate, 50 mg, Oral, Q12H  pantoprazole, 40 mg, Oral, Q AM  pravastatin, 40 mg, Oral, Daily  sertraline, 50 mg, Oral, Daily  sodium chloride, 10 mL, Intravenous, Q12H      Continuous Infusions:     PRN Meds:.  Albuterol Sulfate NEB Orderable    senna-docusate sodium **AND** polyethylene glycol **AND** bisacodyl **AND** bisacodyl    Calcium Replacement - Follow Nurse / BPA Driven Protocol     dextrose    dextrose    glucagon (human recombinant)    Magnesium Standard Dose Replacement - Follow Nurse / BPA Driven Protocol    melatonin    ondansetron    Phosphorus Replacement - Follow Nurse / BPA Driven Protocol    Potassium Replacement - Follow Nurse / BPA Driven Protocol    sodium chloride    sodium chloride    sodium chloride    Assessment & Plan   Assessment & Plan     Active Hospital Problems    Diagnosis  POA    **Orthostatic hypotension [I95.1]  Yes    Acute on chronic urinary retention [R33.9]  Yes    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 male with history of chronic atrial fibrillation s/p watchman, CKD stage III, chronic urinary retention with self caths, HFmr EF, type 2 diabetes, hypertension hyperlipidemia.  Patient sent from urology clinic with complaints of dizziness and orthostatic hypotension.    This patient's problems and plans were partially entered by my partner and updated as appropriate by me 12/10/24.     Acute UTI, POA  Chronic urine retention with severe left hydronephrosis - improved  - Patient with known history of chronic retention. He self cath 4 times a day. Has been having difficulty self cathing likely secondary to developing urethral stricture  - Seen at urology clinic and sent to the hospital because of hypotension  - UA concerning for UTI on admission. Urine culture growing E. Coli, continue Rocephin  - Urology following  - Had a Del Cid catheter placed per urology recs. Repeat CT abdomen after Del Cid catheter placement showed complete resolution of the severe left hydronephrosis. Dr. Larkin (Urology) recommended to discharge the patient on  Del Cid catheter and follow-up with him in the clinic in 2 weeks    Orthostatic hypotension - resolved  - Likely secondary to poor oral intake over the last few days   - Improved with IV fluids  - Lasix held  - Metoprolol resumed 12/8    HFmrEF - compensated  Paroxysmal A-fib  - Echo from 11/21 with EF 35-to 40%  - Recently had an increase in Lasix due to lower extremity edema. Lasix currently on hold because of hypotension  - Continue metoprolol and amiodarone for rate control   - Not on anticoagulation. Status post Watchman device. Continue aspirin alone  - Appreciate help from cardiology team    Right foot pain, likely secondary to gout flare - improved  - CRP and segmentation rate elevated  - X-ray with advanced arthritis but no other acute findings or fractures  - Continue colchicine. Plan to treat for 2 weeks    CKD stage III  - Creatinine stable at baseline. Continue to monitor     Well-controlled type 2 diabetes with A1c of 5.9%  - Continue SSI      Dementia and depression  - Continue Aricept and Namenda  - Continue Zoloft     Chronic weakness  - Case management consulted for short-term rehab      Expected Discharge Location and Transportation: SNF, referrals pending  Expected Discharge   Expected Discharge Date: 12/11/2024; Expected Discharge Time:      VTE Prophylaxis:  Pharmacologic & mechanical VTE prophylaxis orders are present.         AM-PAC 6 Clicks Score (PT): 18 (12/09/24 1940)    CODE STATUS:   Code Status and Medical Interventions: CPR (Attempt to Resuscitate); Full Support   Ordered at: 12/10/24 1050     Level Of Support Discussed With:    Patient     Code Status (Patient has no pulse and is not breathing):    CPR (Attempt to Resuscitate)     Medical Interventions (Patient has pulse or is breathing):    Full Support       Shirlene Mcmahan PA-C  12/10/24

## 2024-12-11 LAB
GLUCOSE BLDC GLUCOMTR-MCNC: 121 MG/DL (ref 70–130)
GLUCOSE BLDC GLUCOMTR-MCNC: 139 MG/DL (ref 70–130)
GLUCOSE BLDC GLUCOMTR-MCNC: 144 MG/DL (ref 70–130)
GLUCOSE BLDC GLUCOMTR-MCNC: 94 MG/DL (ref 70–130)

## 2024-12-11 PROCEDURE — 25010000002 CEFTRIAXONE PER 250 MG: Performed by: INTERNAL MEDICINE

## 2024-12-11 PROCEDURE — 82948 REAGENT STRIP/BLOOD GLUCOSE: CPT

## 2024-12-11 PROCEDURE — 97530 THERAPEUTIC ACTIVITIES: CPT

## 2024-12-11 PROCEDURE — 99231 SBSQ HOSP IP/OBS SF/LOW 25: CPT | Performed by: INTERNAL MEDICINE

## 2024-12-11 PROCEDURE — 25010000002 ENOXAPARIN PER 10 MG

## 2024-12-11 PROCEDURE — 97535 SELF CARE MNGMENT TRAINING: CPT

## 2024-12-11 RX ORDER — ACETAMINOPHEN 325 MG/1
650 TABLET ORAL EVERY 6 HOURS PRN
Status: DISCONTINUED | OUTPATIENT
Start: 2024-12-11 | End: 2024-12-18 | Stop reason: HOSPADM

## 2024-12-11 RX ADMIN — SODIUM CHLORIDE 2000 MG: 900 INJECTION INTRAVENOUS at 08:19

## 2024-12-11 RX ADMIN — MEMANTINE 10 MG: 10 TABLET ORAL at 20:47

## 2024-12-11 RX ADMIN — PANTOPRAZOLE SODIUM 40 MG: 40 TABLET, DELAYED RELEASE ORAL at 05:39

## 2024-12-11 RX ADMIN — SENNOSIDES AND DOCUSATE SODIUM 2 TABLET: 50; 8.6 TABLET ORAL at 09:45

## 2024-12-11 RX ADMIN — MEMANTINE 10 MG: 10 TABLET ORAL at 08:19

## 2024-12-11 RX ADMIN — DONEPEZIL HYDROCHLORIDE 10 MG: 10 TABLET, FILM COATED ORAL at 20:47

## 2024-12-11 RX ADMIN — ASPIRIN 81 MG: 81 TABLET, COATED ORAL at 08:19

## 2024-12-11 RX ADMIN — Medication 10 ML: at 20:46

## 2024-12-11 RX ADMIN — PRAVASTATIN SODIUM 40 MG: 40 TABLET ORAL at 08:19

## 2024-12-11 RX ADMIN — FINASTERIDE 5 MG: 5 TABLET, FILM COATED ORAL at 08:19

## 2024-12-11 RX ADMIN — FLUTICASONE PROPIONATE 2 SPRAY: 50 SPRAY, METERED NASAL at 08:19

## 2024-12-11 RX ADMIN — ENOXAPARIN SODIUM 40 MG: 100 INJECTION SUBCUTANEOUS at 20:46

## 2024-12-11 RX ADMIN — ACETAMINOPHEN 650 MG: 325 TABLET ORAL at 20:51

## 2024-12-11 RX ADMIN — Medication 10 ML: at 08:19

## 2024-12-11 RX ADMIN — METOPROLOL TARTRATE 50 MG: 50 TABLET, FILM COATED ORAL at 20:47

## 2024-12-11 RX ADMIN — METOPROLOL TARTRATE 50 MG: 50 TABLET, FILM COATED ORAL at 08:19

## 2024-12-11 RX ADMIN — FLUTICASONE PROPIONATE 2 SPRAY: 50 SPRAY, METERED NASAL at 20:46

## 2024-12-11 RX ADMIN — SERTRALINE HYDROCHLORIDE 50 MG: 50 TABLET ORAL at 08:19

## 2024-12-11 RX ADMIN — COLCHICINE 0.6 MG: 0.6 TABLET ORAL at 09:44

## 2024-12-11 NOTE — PROGRESS NOTES
[facility] Medicine Services  PROGRESS NOTE    Patient Name: Darien Camarena  : 1935  MRN: [id number]    Date of Admission: 2024  Primary Care Physician: Pito Way MD    Subjective   Subjective     CC:  Follow-up for weakness    HPI:  Patient seen and examined this morning.  Comfortable in bed.  No acute events.  Awaiting SNF    Objective   Objective     Vital Signs:   Temp:  [97.2 °F (36.2 °C)-98.2 °F (36.8 °C)] 98.1 °F (36.7 °C)  Heart Rate:  [] 109  Resp:  [16-20] 18  BP: (111-135)/() 111/90     Physical Exam:  General: Comfortable, not in distress, conversant and cooperative  Head: Atraumatic and normocephalic  Eyes: No Icterus. No pallor  Ears:  Ears appear intact with no abnormalities noted  Throat: No oral lesions, no thrush  Neck: Supple, trachea midline  Lungs: Clear to auscultation bilaterally, equal air entry, no wheezing or crackles  Heart:  Normal S1 and S2, no murmur, no gallop, No JVD, no lower extremity swelling  Abdomen:  Soft, no tenderness, no organomegaly, normal bowel sounds, no organomegaly  Extremities: pulses equal bilaterally  Skin: No bleeding, bruising or rash, normal skin turgor and elasticity  Neurologic: Cranial nerves appear intact with no evidence of facial asymmetry, normal motor and sensory functions in all 4 extremities  Psych: Alert and oriented x 3, normal mood    Results Reviewed:  LAB RESULTS:      Lab 24  0619 24  0439 24  0430 24  1350 24  1034   WBC 8.21 6.83 6.15  --  9.69   HEMOGLOBIN 11.2* 11.3* 11.3*  --  13.6   HEMATOCRIT 35.2* 36.6* 35.3*  --  43.7   PLATELETS 159 154 149  --  252   NEUTROS ABS  --  4.60 4.15  --  7.39*   IMMATURE GRANS (ABS)  --  0.02 0.01  --  0.03   LYMPHS ABS  --  1.32 1.29  --  1.47   MONOS ABS  --  0.45 0.39  --  0.48   EOS ABS  --  0.39 0.26  --  0.25   MCV 97.5* 99.2* 98.3*  --  100.0*   SED RATE 74*  --   --   --   --    CRP 3.55*  --   --   --    --    PROCALCITONIN  --   --   --   --  0.05   LACTATE  --   --   --  2.9* 3.0*   HSTROP T  --   --   --   --  36*         Lab 12/09/24  0619 12/08/24  0439 12/07/24  0430 12/06/24  0835 12/05/24  1034   SODIUM 138 138 141 138 142   POTASSIUM 4.4 4.2 3.9 4.4 5.0   CHLORIDE 105 106 105 105 100   CO2 27.0 27.0 27.0 27.0 29.0   ANION GAP 6.0 5.0 9.0 6.0 13.0   BUN 16 21 21 31* 40*   CREATININE 0.60* 0.68* 0.66* 0.94 1.19   EGFR 92.8 89.4 90.2 78.0 58.8*   GLUCOSE 97 124* 105* 119* 106*   CALCIUM 8.9 8.5* 8.6 8.6 9.7   MAGNESIUM  --   --  1.7  --  1.7   PHOSPHORUS  --   --  3.1  --   --          Lab 12/08/24  0439 12/07/24  0430 12/05/24  1034   TOTAL PROTEIN 5.7* 5.9* 7.7   ALBUMIN 2.7* 2.8* 3.5   GLOBULIN 3.0 3.1 4.2   ALT (SGPT) 15 12 18   AST (SGOT) 33 25 41*   BILIRUBIN 0.5 0.7 0.9   ALK PHOS 167* 166* 221*         Lab 12/05/24  1034   HSTROP T 36*                 Brief Urine Lab Results  (Last result in the past 365 days)        Color   Clarity   Blood   Leuk Est   Nitrite   Protein   CREAT   Urine HCG        12/05/24 1432 Yellow   Clear   Negative   Moderate (2+)   Positive   Negative                   Microbiology Results Abnormal       Procedure Component Value - Date/Time    Urine Culture - Urine, Urine, Catheter [[id number]]  (Abnormal) Collected: 12/05/24 1432    Lab Status: Final result Specimen: Urine, Catheter Updated: 12/07/24 1028     Urine Culture >100,000 CFU/mL Escherichia coli    Narrative:      Refer to previous urine culture collected on 12/05/2024 1610 for MICs    Colonization of the urinary tract without infection is common. Treatment is discouraged unless the patient is symptomatic, pregnant, or undergoing an invasive urologic procedure.            CT Chest With Contrast Diagnostic    Result Date: 12/9/2024  CT CHEST W CONTRAST DIAGNOSTIC Date of Exam: 12/9/2024 1:56 PM EST Indication: f/u lung nodule. Comparison: Chest radiograph from December 5, 2024 and CT chest from September 4, 2024  Technique: Axial CT images were obtained of the chest after the uneventful intravenous administration of 85 mL Isovue-300.  Reconstructed coronal and sagittal images were also obtained. Automated exposure control and iterative construction methods were used. Findings: There is debris within the left mainstem bronchus. There is a moderate left pleural effusion with left basilar atelectasis obscures the region of the previously identified left lower lobe nodule. There is a small right pleural effusion with right basilar  atelectasis. No new pulmonary nodule is identified. The heart size appears normal. The great vessels are normal in caliber. No abnormally enlarged lymph nodes are identified. Partial evaluation of the upper abdomen demonstrates left renal cyst. No aggressive osseous lesions are identified.     Impression: Impression: 1.Moderate left pleural effusion with left basilar atelectasis obscures the region of the previously identified left lower lobe nodule. Recommend follow-up CT chest in 3 months. 2.Small right pleural effusion with right basilar atelectasis. Electronically Signed: Renny Good MD  12/9/2024 2:51 PM EST  Workstation ID: [id number]    XR Ankle 3+ View Right    Result Date: 12/9/2024  XR ANKLE 3+ VW RIGHT Date of Exam: 12/9/2024 10:14 AM EST Indication: PAIN Comparison: None available. Findings: Acute enthesopathy and plantar calcaneal spurring. Vascular calcification. Moderate to severe degenerative changes of the midfoot. Sequela of old medial ankle injury. No asymmetric ankle mortise widening. Minimal lucency along the lateral talar dome. No evidence of acute fracture.     Impression: Impression: 1.No evidence of acute fracture or malalignment. 2.Minimal lucency along the lateral talar dome may represent a small osteochondral lesion. 3.Moderate to severe degenerative changes of the midfoot. Electronically Signed: Alexei Apodaca MD  12/9/2024 10:42 AM EST  Workstation ID: [id number]      Results for orders placed during the hospital encounter of 11/21/24    Adult Transesophageal Echo 3D (FARHAD) W/ Cont If Necessary Per Protocol    Interpretation Summary    There is a 27mm Watchman FLX device in place. It appears well seated and well compressed with no device related thrombus seen. There is a small jet of color flow, 2.5 mm, at the superior aspect adjacent to the left upper pulmonary vein. This was not seen previous examination however image quality is improved compared to prior.    Left ventricular systolic function is moderately decreased. Left ventricular ejection fraction appears to be 36 - 40%. Normal left ventricular wall thickness noted. The left ventricular cavity is dilated. There is left ventricular global hypokinesis noted.    : The right ventricular cavity is mildly dilated. Mildly reduced right ventricular systolic function noted.    : The left atrial cavity is severely dilated. No evidence of a left atrial thrombus present. There is light spontaneous echo contrast present in the atrial body.    The right atrial cavity is severely dilated.    There is mild, bileaflet mitral valve thickening present. Mild mitral valve regurgitation is present. No significant mitral valve stenosis is present.    Mild tricuspid valve regurgitation is present. Estimated right ventricular systolic pressure from tricuspid regurgitation is mildly elevated (35-45 mmHg).    There is moderate pulmonic valve regurgitation present.      Current medications:  Scheduled Meds:aspirin, 81 mg, Oral, Daily  cefTRIAXone, 2,000 mg, Intravenous, Q24H  colchicine, 0.6 mg, Oral, Daily  donepezil, 10 mg, Oral, Nightly  enoxaparin, 40 mg, Subcutaneous, Nightly  finasteride, 5 mg, Oral, Daily  fluticasone, 2 spray, Each Nare, BID  insulin lispro, 2-7 Units, Subcutaneous, 4x Daily AC & at Bedtime  memantine, 10 mg, Oral, BID  metoprolol tartrate, 50 mg, Oral, Q12H  pantoprazole, 40 mg, Oral, Q AM  pravastatin, 40 mg, Oral,  Daily  sertraline, 50 mg, Oral, Daily  sodium chloride, 10 mL, Intravenous, Q12H      Continuous Infusions:     PRN Meds:.  Albuterol Sulfate NEB Orderable    senna-docusate sodium **AND** polyethylene glycol **AND** bisacodyl **AND** bisacodyl    Calcium Replacement - Follow Nurse / BPA Driven Protocol    dextrose    dextrose    glucagon (human recombinant)    Magnesium Standard Dose Replacement - Follow Nurse / BPA Driven Protocol    melatonin    ondansetron    Phosphorus Replacement - Follow Nurse / BPA Driven Protocol    Potassium Replacement - Follow Nurse / BPA Driven Protocol    sodium chloride    sodium chloride    sodium chloride    Assessment & Plan   Assessment & Plan     Active Hospital Problems    Diagnosis  POA    **Orthostatic hypotension [I95.1]  Yes    Acute on chronic urinary retention [R33.9]  Yes    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 male with history of chronic atrial fibrillation s/p watchman, CKD stage III, chronic urinary retention with self caths, HFmr EF, type 2 diabetes, hypertension hyperlipidemia.  Patient sent from urology clinic with complaints of dizziness and orthostatic hypotension    Acute UTI, POA  Chronic urine retention with severe left hydronephrosis, improved  Patient with known history of chronic retention.  He self cath 4 times a day.  Have been having difficulty self cathing likely secondary to developing urethral stricture  Seen at urology clinic and sent to the hospital because of hypotension  UA is concerning for UTI.  Urine culture with pansensitive E. coli.  Continue Rocephin and transition to p.o. cefdinir upon discharge  Urology  following  Had a Del Cid catheter placed per urology recs.  Repeat CT abdomen after Del Cid catheter placement showed complete resolution of the severe left hydronephrosis.  Dr. Larkin /urology recommended to discharge the patient on Del Cid catheter and follow-up with him in the clinic in 2 weeks    Orthostatic hypotension, resolved  Likely secondary to poor oral intake over the last few days   Improved with IV fluids  Continue metoprolol     HFmrEF, compensated  Paroxysmal A-fib  Echo from 11/21 with EF 35-to 40%  Recently had a recent increase in Lasix due to lower extremity edema.  Lasix currently on hold because of hypotension  Continue metoprolol and amiodarone for rate control   Not on anticoagulation.  Status post Watchman device.  Continue aspirin alone  Appreciate help from cardiology team    Right foot pain secondary to gout flare, improved  CRP and sedimentation rate are elevated  X-ray with advanced arthritis but no other acute findings or fractures  Pain improved with colchicine .  Continue treatment for 2 weeks    CKD stage III  Creatinine stable at baseline.  Continue to monitor     Well-controlled type 2 diabetes   A1c 5.9%  Continue SSI      Dementia and depression  Continue Aricept and Namenda  Continue Zoloft     Chronic weakness  Case management consulted for short-term rehab      Expected Discharge Location and Transportation: SNF  Expected Discharge   Expected Discharge Date: 12/11/2024; Expected Discharge Time:      VTE Prophylaxis:  Pharmacologic & mechanical VTE prophylaxis orders are present.         AM-PAC 6 Clicks Score (PT): 13 (12/10/24 2020)    CODE STATUS:   Code Status and Medical Interventions: CPR (Attempt to Resuscitate); Full Support   Ordered at: 12/10/24 1050     Level Of Support Discussed With:    Patient     Code Status (Patient has no pulse and is not breathing):    CPR (Attempt to Resuscitate)     Medical Interventions (Patient has pulse or is breathing):    Full Support        Clarence Samaniego MD  12/11/24

## 2024-12-11 NOTE — CASE MANAGEMENT/SOCIAL WORK
Continued Stay Note  [facility]     Patient Name: Darien Camarena  MRN: [id number]  Today's Date: 12/11/2024    Admit Date: 12/5/2024    Plan: Rehab   Discharge Plan       Row Name 12/11/24 1358       Plan    Plan Rehab    Patient/Family in Agreement with Plan yes    Plan Comments Discussed in MDR. Referrals have been made, no bed offers at this time. CM will continue to follow up on referrals. CM will assist with any discharge needs as indicated.                   Discharge Codes    No documentation.                 Expected Discharge Date and Time       Expected Discharge Date Expected Discharge Time    Dec 12, 2024               Leandra Lion RN

## 2024-12-11 NOTE — PROGRESS NOTES
"          Clinical Nutrition Assessment     Patient Name: Darien Camarena  YOB: 1935  MRN: [id number]  Date of Encounter: 12/11/24 11:22 EST  Admission date: 12/5/2024  Reason for Visit: Follow-up protocol    Assessment   Nutrition Assessment   Admission Diagnosis:  A-fib [I48.91]    Problem List:    Orthostatic hypotension    Essential hypertension    Permanent atrial fibrillation    Stage 3 chronic kidney disease    Heart failure with mildly reduced ejection fraction (HFmrEF)    Acute on chronic urinary retention      PMH:   He  has a past medical history of Arrhythmia, Atrial fibrillation, Chronic kidney disease, COPD (chronic obstructive pulmonary disease), Coronary artery disease, Dementia, Diabetes mellitus, E. coli sepsis (06/23/2022), Enlarged prostate without lower urinary tract symptoms (luts) (06/20/2016), Full dentures, GERD (gastroesophageal reflux disease), Gout, Hearing aid worn, History of colonoscopy (09/12/2012), History of radiation therapy (02/24/2023), [place] (hard of hearing), Hyperlipidemia, Hypertension, Kidney stone, Lung cancer, STEVEN on CPAP, PAF (paroxysmal atrial fibrillation), Prostatism, Urinary incontinence, Urinary tract infection, and Wears glasses.    PSH:  He  has a past surgical history that includes Kidney stone surgery; Cataract extraction (Bilateral); Cardioversion; Cardiac catheterization (Left, 09/29/2022); Cystoscopy; Colonoscopy; Esophagogastroduodenoscopy; Atrial Appendage Exclusion Left (N/A, 11/28/2023); Esophagogastroduodenoscopy (N/A, 9/5/2024); ERCP (N/A, 9/5/2024); ERCP (N/A, 10/31/2024); and Esophagogastroduodenoscopy (N/A, 10/31/2024).    Applicable Nutrition History:       Anthropometrics     Height: Height: 190.5 cm (75\")  Last Filed Weight: Weight: 90.7 kg (200 lb) (12/09/24 1832)  Method: Weight Method: Standing scale  BMI: BMI (Calculated): 25    UBW: 260lb    Weight change: weight loss of 47 lbs (18%) over 9 month(s)    Significant?  " Yes    Nutrition Focused Physical Exam    Date: 12/6    Patient meets criteria for malnutrition diagnosis, see MSA note.     Subjective   Reported/Observed/Food/Nutrition Related History:     12/11  Pt reports intake is improving, states he ate the most today that he has in a while. Pt also endorses drinking Boost Breeze TID and Boost OG w/dinner. Waiting on SNF placement per MD note.     12/6  Patient triggered for nutrition assessment due to weight loss and decreased intake.  Per EMR, patient with visit outpatient RD due to weight loss (in October). Patient had reported about 40lb weight loss in 7 months.   Noted with on going weigh loss per documentation.    Patient in bedside chair at time of visit, daughter in the room as well. Daughter reports weight loss started mostly after patient getting thrush that affected his taste for food and therefore affected his overall intake (started several months ago).   Appetite started to come back and then declined again the past couple of weeks due to current illness.  Daughter reports patient lives alone and has been having a difficult time preparing his meals. She has set him up with meals on wheels as of  3 weeks. Patient likes the meals.  He tried ONS, only likes Boost Breeze.  We discussed other OTC supplement options for post discharge.  We discussed RD finding of MSA.       Current Nutrition Prescription   PO: Diet: Cardiac; Healthy Heart (2-3 Na+); Fluid Consistency: Thin (IDDSI 0)  Oral Nutrition Supplement:   Intake:75% x 2 meals documented    Assessment & Plan   Nutrition Diagnosis   Date:  12/6 Updated:  Problem Malnutrition, chronic severe   Etiology Oral thrush; UTI (intake < needs)    Signs/Symptoms <75% of EEN x >/= 1 month, significant weight loss of 18% in 9 months , mild/moderate muscle wasting, and mild/moderate subcutaneous fat loss   Status: New    Goal / Objectives:   Nutrition to support treatment and Establish PO, Continue positive  trend      Nutrition Intervention      Follow treatment progress, Care plan reviewed, Encourage intake, Supplement provided    Encourage po intake and supplement use 2/2 malnutrition  - Boost Breeze TID   - Boost plus daily with dinner (patient wants to try again, might consider drinking at home)    Monitoring/Evaluation:   Per protocol, PO intake, Supplement intake    Mayela Olivares MS,RD,LD  Time Spent: 15min

## 2024-12-11 NOTE — PLAN OF CARE
Goal Outcome Evaluation:  Plan of Care Reviewed With: patient        Progress: no change  Outcome Evaluation: Patient on room air. A-fib on monitor. Vital signs stable. Patient unable to sleep. No complaints of pain. No acute events this shift.

## 2024-12-11 NOTE — PLAN OF CARE
Problem: Adult Inpatient Plan of Care  Goal: Plan of Care Review  Outcome: Progressing  Flowsheets (Taken 12/11/2024 1655)  Progress: improving  Plan of Care Reviewed With: patient  Goal: Patient-Specific Goal (Individualized)  Outcome: Progressing  Goal: Absence of Hospital-Acquired Illness or Injury  Outcome: Progressing  Intervention: Identify and Manage Fall Risk  Recent Flowsheet Documentation  Taken 12/11/2024 1600 by Yoni Davila RN  Safety Promotion/Fall Prevention:   activity supervised   clutter free environment maintained   assistive device/personal items within reach   fall prevention program maintained   nonskid shoes/slippers when out of bed   safety round/check completed   room organization consistent  Taken 12/11/2024 1200 by Yoni Davila RN  Safety Promotion/Fall Prevention:   activity supervised   assistive device/personal items within reach   clutter free environment maintained   fall prevention program maintained   nonskid shoes/slippers when out of bed   room organization consistent   safety round/check completed  Taken 12/11/2024 1000 by Yoni Davila RN  Safety Promotion/Fall Prevention:   activity supervised   assistive device/personal items within reach   clutter free environment maintained   fall prevention program maintained   nonskid shoes/slippers when out of bed   safety round/check completed   room organization consistent  Taken 12/11/2024 0800 by Yoni Davila RN  Safety Promotion/Fall Prevention:   activity supervised   assistive device/personal items within reach   clutter free environment maintained   fall prevention program maintained   nonskid shoes/slippers when out of bed   safety round/check completed   room organization consistent  Intervention: Prevent Skin Injury  Recent Flowsheet Documentation  Taken 12/11/2024 1600 by Yoni Davila RN  Body Position: position changed independently  Taken 12/11/2024 1200 by Yoni Davila RN  Body Position: position  changed independently  Taken 12/11/2024 1000 by Yoni Davila RN  Body Position: position changed independently  Taken 12/11/2024 0800 by Yoni Davila RN  Body Position: position changed independently  Skin Protection:   drying agents applied   incontinence pads utilized   transparent dressing maintained   silicone foam dressing in place   protective footwear used  Intervention: Prevent Infection  Recent Flowsheet Documentation  Taken 12/11/2024 1600 by Yoni Davila RN  Infection Prevention:   environmental surveillance performed   hand hygiene promoted   single patient room provided  Taken 12/11/2024 1200 by Yoni Davila RN  Infection Prevention:   environmental surveillance performed   hand hygiene promoted   single patient room provided  Taken 12/11/2024 1000 by Yoni Davila RN  Infection Prevention:   environmental surveillance performed   hand hygiene promoted   single patient room provided  Taken 12/11/2024 0800 by Yoni Davila RN  Infection Prevention:   environmental surveillance performed   hand hygiene promoted   single patient room provided  Goal: Optimal Comfort and Wellbeing  Outcome: Progressing  Goal: Readiness for Transition of Care  Outcome: Progressing     Problem: Fall Injury Risk  Goal: Absence of Fall and Fall-Related Injury  Outcome: Progressing  Intervention: Identify and Manage Contributors  Recent Flowsheet Documentation  Taken 12/11/2024 1600 by Yoni Davila RN  Medication Review/Management: medications reviewed  Taken 12/11/2024 0800 by Yoni Davila RN  Medication Review/Management: medications reviewed  Intervention: Promote Injury-Free Environment  Recent Flowsheet Documentation  Taken 12/11/2024 1600 by Yoni Davila RN  Safety Promotion/Fall Prevention:   activity supervised   clutter free environment maintained   assistive device/personal items within reach   fall prevention program maintained   nonskid shoes/slippers when out of bed   safety  round/check completed   room organization consistent  Taken 12/11/2024 1200 by Yoni Davila RN  Safety Promotion/Fall Prevention:   activity supervised   assistive device/personal items within reach   clutter free environment maintained   fall prevention program maintained   nonskid shoes/slippers when out of bed   room organization consistent   safety round/check completed  Taken 12/11/2024 1000 by Yoni Davila RN  Safety Promotion/Fall Prevention:   activity supervised   assistive device/personal items within reach   clutter free environment maintained   fall prevention program maintained   nonskid shoes/slippers when out of bed   safety round/check completed   room organization consistent  Taken 12/11/2024 0800 by Yoni Davila RN  Safety Promotion/Fall Prevention:   activity supervised   assistive device/personal items within reach   clutter free environment maintained   fall prevention program maintained   nonskid shoes/slippers when out of bed   safety round/check completed   room organization consistent     Problem: Urinary Retention  Goal: Effective Urinary Elimination  Outcome: Progressing     Problem: Syncope  Goal: Absence of Syncopal Symptoms  Outcome: Progressing  Intervention: Manage Effect of Syncopal Symptoms  Recent Flowsheet Documentation  Taken 12/11/2024 1600 by Yoni Davila RN  Safety Promotion/Fall Prevention:   activity supervised   clutter free environment maintained   assistive device/personal items within reach   fall prevention program maintained   nonskid shoes/slippers when out of bed   safety round/check completed   room organization consistent  Taken 12/11/2024 1200 by Yoni Davila RN  Safety Promotion/Fall Prevention:   activity supervised   assistive device/personal items within reach   clutter free environment maintained   fall prevention program maintained   nonskid shoes/slippers when out of bed   room organization consistent   safety round/check  completed  Taken 12/11/2024 1000 by Yoni Davila RN  Safety Promotion/Fall Prevention:   activity supervised   assistive device/personal items within reach   clutter free environment maintained   fall prevention program maintained   nonskid shoes/slippers when out of bed   safety round/check completed   room organization consistent  Taken 12/11/2024 0800 by Yoni Davila RN  Safety Promotion/Fall Prevention:   activity supervised   assistive device/personal items within reach   clutter free environment maintained   fall prevention program maintained   nonskid shoes/slippers when out of bed   safety round/check completed   room organization consistent     Problem: Skin Injury Risk Increased  Goal: Skin Health and Integrity  Outcome: Progressing  Intervention: Optimize Skin Protection  Recent Flowsheet Documentation  Taken 12/11/2024 1600 by Yoni Davila, RN  Activity Management: activity encouraged  Head of Bed (HOB) Positioning: HOB elevated  Taken 12/11/2024 1200 by Yoni Davila, RN  Activity Management: activity encouraged  Head of Bed (HOB) Positioning: HOB elevated  Taken 12/11/2024 1000 by Yoni Davila, RN  Activity Management: activity encouraged  Head of Bed (HOB) Positioning: HOB elevated  Taken 12/11/2024 0800 by Yoni Davila, RN  Activity Management: activity encouraged  Pressure Reduction Techniques: heels elevated off bed  Head of Bed (HOB) Positioning: HOB elevated  Pressure Reduction Devices:   pressure-redistributing mattress utilized   positioning supports utilized  Skin Protection:   drying agents applied   incontinence pads utilized   transparent dressing maintained   silicone foam dressing in place   protective footwear used     Problem: Sepsis/Septic Shock  Goal: Optimal Coping  Outcome: Progressing  Goal: Absence of Bleeding  Outcome: Progressing  Goal: Blood Glucose Level Within Target Range  Outcome: Progressing  Goal: Absence of Infection Signs and Symptoms  Outcome:  Progressing  Intervention: Initiate Sepsis Management  Recent Flowsheet Documentation  Taken 12/11/2024 1600 by Yoni Davila RN  Infection Prevention:   environmental surveillance performed   hand hygiene promoted   single patient room provided  Isolation Precautions:   contact   precautions maintained  Taken 12/11/2024 1200 by Yoni Davila RN  Infection Prevention:   environmental surveillance performed   hand hygiene promoted   single patient room provided  Taken 12/11/2024 1000 by Yoni Davila, RN  Infection Prevention:   environmental surveillance performed   hand hygiene promoted   single patient room provided  Taken 12/11/2024 0800 by Yoni Davila, RN  Infection Prevention:   environmental surveillance performed   hand hygiene promoted   single patient room provided  Isolation Precautions:   contact   precautions maintained  Intervention: Promote Recovery  Recent Flowsheet Documentation  Taken 12/11/2024 1600 by Yoni Davila, RN  Activity Management: activity encouraged  Taken 12/11/2024 1200 by Yoni Davila, RN  Activity Management: activity encouraged  Taken 12/11/2024 1000 by Yoni Davila, RN  Activity Management: activity encouraged  Taken 12/11/2024 0800 by Yoni Davila, RN  Activity Management: activity encouraged  Goal: Optimal Nutrition Delivery  Outcome: Progressing   Goal Outcome Evaluation:  Plan of Care Reviewed With: patient        Progress: improving

## 2024-12-11 NOTE — THERAPY TREATMENT NOTE
Patient Name: Darien Camarena  : 1935    MRN: [id number]                              Today's Date: 2024       Admit Date: 2024    Visit Dx:     ICD-10-CM ICD-9-CM   1. Orthostasis  I95.1 458.0   2. Light headedness  R42 780.4   3. Chronic atrial fibrillation with rapid ventricular response  I48.20 427.31   4. Acute on chronic urinary retention  R33.9 788.29     Patient Active Problem List   Diagnosis    Type 2 diabetes mellitus with stage 3 chronic kidney disease, without long-term current use of insulin    Gastroesophageal reflux disease with esophagitis    Hyperlipidemia LDL goal <100    Essential hypertension    Nephrolithiasis    Obstructive sleep apnea syndrome    Permanent atrial fibrillation    Stage 3 chronic kidney disease    Coronary artery disease involving native coronary artery of native heart without angina pectoris    Malignant neoplasm of lower lobe of left lung    H/O: GI bleed    Presence of Watchman left atrial appendage closure device    Orthostatic hypotension    Heart failure with mildly reduced ejection fraction (HFmrEF)    Obesity    Choledocholithiasis    Severe malnutrition    Esophagitis    Encounter for removal of biliary stent    Acute on chronic urinary retention     Past Medical History:   Diagnosis Date    Arrhythmia     Atrial fibrillation     Chronic kidney disease     COPD (chronic obstructive pulmonary disease)     Coronary artery disease     Dementia     Diabetes mellitus     doesnt check sugar    E. coli sepsis 2022    Enlarged prostate without lower urinary tract symptoms (luts) 2016    Full dentures     GERD (gastroesophageal reflux disease)     Gout     Hearing aid worn     bilat prn    History of colonoscopy 2012    History of radiation therapy 2023    SBRT LLL lung    [place] (hard of hearing)     hearing aids prn    Hyperlipidemia     Hypertension     Kidney stone     surgery x1    Lung cancer     STEVEN on CPAP     compliant with  machine    PAF (paroxysmal atrial fibrillation)     Prostatism     Urinary incontinence     Urinary tract infection     Wears glasses     readers     Past Surgical History:   Procedure Laterality Date    ATRIAL APPENDAGE EXCLUSION LEFT WITH TRANSESOPHAGEAL ECHOCARDIOGRAM N/A 11/28/2023    Procedure: Atrial Appendage Occlusion;  Surgeon: Anthony Mcdaniel MD;  Location: [facility] EP INVASIVE LOCATION;  Service: Cardiovascular;  Laterality: N/A;    CARDIAC CATHETERIZATION Left 09/29/2022    Procedure: Left Heart Cath;  Surgeon: Titus Oliveros IV, MD;  Location:  [facility] CATH INVASIVE LOCATION;  Service: Cardiovascular;  Laterality: Left;    CARDIOVERSION      CATARACT EXTRACTION Bilateral     COLONOSCOPY      CYSTOSCOPY      ENDOSCOPY      possible    ENDOSCOPY N/A 9/5/2024    Procedure: ESOPHAGOGASTRODUODENOSCOPY;  Surgeon: Anthony Arambula MD;  Location:  [facility];  Service: Gastroenterology;  Laterality: N/A;  Esophagus dilated to 18mm with 12mm-mm to 15mm, then 15mm to 18mm balloon.    ENDOSCOPY N/A 10/31/2024    Procedure: ESOPHAGOGASTRODUODENOSCOPY WITH BIOPSY;  Surgeon: Carlos Dior MD;  Location:  [facility];  Service: Gastroenterology;  Laterality: N/A;    ERCP N/A 9/5/2024    Procedure: ENDOSCOPIC RETROGRADE CHOLANGIOPANCREATOGRAPHY;  Surgeon: Anthony Arambula MD;  Location:  [facility];  Service: Gastroenterology;  Laterality: N/A;  Sphincterotomy made to ampula of common bile duct (CBD), CBD swept with 9mm-12mm balloon - sludge, stones and purulent appearing drainage extracted. 10x7 Welsh biliary stent depolyed into CBD. ERCP scope removed with balloon intact.    ERCP N/A 10/31/2024    Procedure: ENDOSCOPIC RETROGRADE CHOLANGIOPANCREATOGRAPHY;  Surgeon: Carlos Dior MD;  Location:  [facility];  Service: Gastroenterology;  Laterality: N/A;    KIDNEY STONE SURGERY      x1      General Information       Row Name 12/11/24 4567          OT Time and Intention     Document Type therapy note (daily note)  -LR     Mode of Treatment occupational therapy  -LR       Row Name 12/11/24 1503          General Information    Patient Profile Reviewed yes  -LR     Existing Precautions/Restrictions fall;other (see comments)  hx of orthostatic hypotension  -LR     Barriers to Rehab none identified  -LR       Row Name 12/11/24 1503          Cognition    Orientation Status (Cognition) oriented x 4  -LR       Row Name 12/11/24 1503          Safety Issues/Impairments Affecting Functional Mobility    Safety Issues Affecting Function (Mobility) safety precaution awareness;safety precautions follow-through/compliance;awareness of need for assistance  -LR     Impairments Affecting Function (Mobility) balance;endurance/activity tolerance;strength;motor planning;pain;shortness of breath  -LR               User Key  (r) = Recorded By, (t) = Taken By, (c) = Cosigned By      Initials Name Provider Type    LR Daniela Jo, OT Occupational Therapist                     Mobility/ADL's       Row Name 12/11/24 1507          Bed Mobility    Bed Mobility supine-sit  -LR     Supine-Sit [place] (Bed Mobility) supervision  -LR     Assistive Device (Bed Mobility) bed rails;head of bed elevated  -LR       Row Name 12/11/24 1507          Transfers    Transfers bed-chair transfer;sit-stand transfer;stand-sit transfer  -LR     Comment, (Transfers) VC for pushing up from bed instead of FWW for safety  -LR       Row Name 12/11/24 1507          Bed-Chair Transfer    Bed-Chair [place] (Transfers) contact guard  -LR     Assistive Device (Bed-Chair Transfers) walker, front-wheeled  -LR       Row Name 12/11/24 1507          Sit-Stand Transfer    Sit-Stand [place] (Transfers) contact guard  -LR     Assistive Device (Sit-Stand Transfers) walker, front-wheeled  -LR       Row Name 12/11/24 1507          Stand-Sit Transfer    Stand-Sit [place] (Transfers) contact guard;verbal cues  -LR     Assistive  Device (Stand-Sit Transfers) walker, front-wheeled  -LR       Row Name 12/11/24 1507          Functional Mobility    Functional Mobility- Ind. Level contact guard assist;verbal cues required  -LR     Functional Mobility- Device walker, front-wheeled  -LR     Functional Mobility-Distance (Feet) --  grossly 45 feet in room  -LR     Functional Mobility- Safety Issues balance decreased during turns  -LR     Functional Mobility- Comment No LOB noted, but mild balance deficits  -LR     Patient was able to Ambulate yes  -LR       Row Name 12/11/24 1507          Activities of Daily Living    BADL Assessment/Intervention lower body dressing;upper body dressing;grooming;toileting  -LR       Row Name 12/11/24 1507          Upper Body Dressing Assessment/Training    [place] Level (Upper Body Dressing) don;front opening garment;minimum assist (75% patient effort)  -LR     Position (Upper Body Dressing) edge of bed sitting  -LR       Row Name 12/11/24 1507          Lower Body Dressing Assessment/Training    [place] Level (Lower Body Dressing) don;socks;moderate assist (50% patient effort)  -LR     Position (Lower Body Dressing) edge of bed sitting  -LR       Row Name 12/11/24 1507          Grooming Assessment/Training    [place] Level (Grooming) wash face, hands;hair care, combing/brushing;contact guard assist  -LR     Position (Grooming) sink side  -LR       Row Name 12/11/24 1507          Toileting Assessment/Training    [place] Level (Toileting) perform perineal hygiene;dependent (less than 25% patient effort)  -LR     Position (Toileting) supported standing  -LR               User Key  (r) = Recorded By, (t) = Taken By, (c) = Cosigned By      Initials Name Provider Type    LR Daniela Jo OT Occupational Therapist                   Obj/Interventions       Row Name 12/11/24 1518          Balance    Balance Assessment sitting static balance;sitting dynamic balance;sit to stand dynamic balance;standing  static balance;standing dynamic balance  -LR     Static Sitting Balance standby assist  -LR     Dynamic Sitting Balance standby assist  -LR     Position, Sitting Balance unsupported;sitting edge of bed  -LR     Dynamic Standing Balance contact guard  -LR     Position/Device Used, Standing Balance supported;walker, front-wheeled  -LR     Balance Interventions sitting;standing;sit to stand;static;supported;dynamic;occupation based/functional task  -LR               User Key  (r) = Recorded By, (t) = Taken By, (c) = Cosigned By      Initials Name Provider Type    LR Daniela Jo, OT Occupational Therapist                   Goals/Plan    No documentation.                  Clinical Impression       Row Name 12/11/24 1513          Pain Assessment    Pretreatment Pain Rating 0/10 - no pain  -LR     Posttreatment Pain Rating 0/10 - no pain  -LR       Row Name 12/11/24 1513          Plan of Care Review    Plan of Care Reviewed With patient;daughter  -LR     Progress improving  -LR     Outcome Evaluation Pt continues to present below baseline function with decreased activity tolerance, strength, and mild balance deficits during ambulation. Able to tolerate participating in grooming tasks sink side. No reports of dizziness or lightheadedness. Recommend IPOT POC and home with assist and home health.  -LR       Row Name 12/11/24 1513          Therapy Plan Review/Discharge Plan (OT)    Anticipated Discharge Disposition (OT) home with assist;home with home health  -LR       Row Name 12/11/24 1513          Vital Signs    Pre Systolic BP Rehab 136  -LR     Pre Treatment Diastolic BP 93  -LR     Pretreatment Heart Rate (beats/min) 94  -LR     Intratreatment Heart Rate (beats/min) 122   -LR     Posttreatment Heart Rate (beats/min) 102  -LR     Pre SpO2 (%) 92  -LR     O2 Delivery Pre Treatment room air  -LR     O2 Delivery Intra Treatment room air  -LR     Post SpO2 (%) 93  -LR     O2 Delivery Post Treatment room air  -LR     Pre  Patient Position Supine  -LR     Intra Patient Position Standing  -LR     Post Patient Position Sitting  -LR       Row Name 12/11/24 1513          Positioning and Restraints    Pre-Treatment Position in bed  -LR     Post Treatment Position chair  -LR     In Chair notified nsg;reclined;call light within reach;encouraged to call for assist;exit alarm on;with family/caregiver;waffle cushion;legs elevated  -LR               User Key  (r) = Recorded By, (t) = Taken By, (c) = Cosigned By      Initials Name Provider Type    Daniela Awad OT Occupational Therapist                   Outcome Measures       Row Name 12/11/24 1521          How much help from another is currently needed...    Putting on and taking off regular lower body clothing? 3  -LR     Bathing (including washing, rinsing, and drying) 3  -LR     Toileting (which includes using toilet bed pan or urinal) 3  -LR     Putting on and taking off regular upper body clothing 3  -LR     Taking care of personal grooming (such as brushing teeth) 4  -LR     Eating meals 4  -LR     AM-PAC 6 Clicks Score (OT) 20  -LR       Row Name 12/11/24 1521          Functional Assessment    Outcome Measure Options AM-PAC 6 Clicks Daily Activity (OT)  -LR               User Key  (r) = Recorded By, (t) = Taken By, (c) = Cosigned By      Initials Name Provider Type    Daniela Awad OT Occupational Therapist                    Occupational Therapy Education       Title: PT OT SLP Therapies (In Progress)       Topic: Occupational Therapy (In Progress)       Point: ADL training (In Progress)       Description:   Instruct learner(s) on proper safety adaptation and remediation techniques during self care or transfers.   Instruct in proper use of assistive devices.                  Learning Progress Summary            Patient Acceptance, E, NR by LR at 12/11/2024 1352    Acceptance, E,TB, VU,DU by KF at 12/6/2024 0846   Family Acceptance, E, NR by LR at 12/11/2024 1352                       Point: Home exercise program (In Progress)       Description:   Instruct learner(s) on appropriate technique for monitoring, assisting and/or progressing therapeutic exercises/activities.                  Learning Progress Summary            Patient Acceptance, E, NR by LR at 12/11/2024 1352   Family Acceptance, E, NR by LR at 12/11/2024 1352                      Point: Precautions (In Progress)       Description:   Instruct learner(s) on prescribed precautions during self-care and functional transfers.                  Learning Progress Summary            Patient Acceptance, E, NR by LR at 12/11/2024 1352    Acceptance, E,TB, VU,DU by KF at 12/6/2024 0846   Family Acceptance, E, NR by LR at 12/11/2024 1352                      Point: Body mechanics (In Progress)       Description:   Instruct learner(s) on proper positioning and spine alignment during self-care, functional mobility activities and/or exercises.                  Learning Progress Summary            Patient Acceptance, E, NR by LR at 12/11/2024 1352    Acceptance, E,TB, VU,DU by KF at 12/6/2024 0846   Family Acceptance, E, NR by LR at 12/11/2024 1352                                      User Key       Initials Effective Dates Name Provider Type Discipline     08/09/23 -  Coleen Tsai, OT Occupational Therapist OT     10/09/24 -  Daniela Jo, OT Occupational Therapist OT                  OT Recommendation and Plan     Plan of Care Review  Plan of Care Reviewed With: patient, daughter  Progress: improving  Outcome Evaluation: Pt continues to present below baseline function with decreased activity tolerance, strength, and mild balance deficits during ambulation. Able to tolerate participating in grooming tasks sink side. No reports of dizziness or lightheadedness. Recommend IPOT POC and home with assist and home health.     Time Calculation:         Time Calculation- OT       Row Name 12/11/24 1522             Time Calculation-  OT    OT Start Time 1352  -LR      OT Received On 12/11/24  -LR      OT Goal Re-Cert Due Date 12/16/24  -LR         Timed Charges    67638 - OT Therapeutic Activity Minutes 15  -LR      10048 - OT Self Care/Mgmt Minutes 30  -LR         Total Minutes    Timed Charges Total Minutes 45  -LR       Total Minutes 45  -LR                User Key  (r) = Recorded By, (t) = Taken By, (c) = Cosigned By      Initials Name Provider Type    LR Daniela Jo, GERM[place]N Occupational Therapist                  Therapy Charges for Today       Code Description Service Date Service Provider Modifiers Qty    04225838031 HC OT THERAPEUTIC ACT EA 15 MIN 12/11/2024 Daniela Jo, OT GO 1    60944603660 HC OT SELF CARE/MGMT/TRAIN EA 15 MIN 12/11/2024 Daniela Jo OT GO 2                 Daniela Jo OT  12/11/2024

## 2024-12-11 NOTE — PLAN OF CARE
Goal Outcome Evaluation:  Plan of Care Reviewed With: patient, daughter        Progress: improving  Outcome Evaluation: Pt continues to present below baseline function with decreased activity tolerance, strength, and mild balance deficits during ambulation. Able to tolerate participating in grooming tasks sink side. No reports of dizziness or lightheadedness. Recommend IPOT POC and home with assist and home health.    Anticipated Discharge Disposition (OT): home with assist, home with home health

## 2024-12-12 LAB
ALBUMIN SERPL-MCNC: 2.5 G/DL (ref 3.5–5.2)
ALBUMIN/GLOB SERPL: 0.7 G/DL
ALP SERPL-CCNC: 172 U/L (ref 39–117)
ALT SERPL W P-5'-P-CCNC: 31 U/L (ref 1–41)
ANION GAP SERPL CALCULATED.3IONS-SCNC: 7 MMOL/L (ref 5–15)
AST SERPL-CCNC: 57 U/L (ref 1–40)
BASOPHILS # BLD AUTO: 0.07 10*3/MM3 (ref 0–0.2)
BASOPHILS NFR BLD AUTO: 1.2 % (ref 0–1.5)
BILIRUB SERPL-MCNC: 0.4 MG/DL (ref 0–1.2)
BUN SERPL-MCNC: 16 MG/DL (ref 8–23)
BUN/CREAT SERPL: 25 (ref 7–25)
CALCIUM SPEC-SCNC: 8.6 MG/DL (ref 8.6–10.5)
CHLORIDE SERPL-SCNC: 105 MMOL/L (ref 98–107)
CO2 SERPL-SCNC: 26 MMOL/L (ref 22–29)
CREAT SERPL-MCNC: 0.64 MG/DL (ref 0.76–1.27)
CRP SERPL-MCNC: 2.65 MG/DL (ref 0–0.5)
DEPRECATED RDW RBC AUTO: 51.2 FL (ref 37–54)
EGFRCR SERPLBLD CKD-EPI 2021: 91.1 ML/MIN/1.73
EOSINOPHIL # BLD AUTO: 0.48 10*3/MM3 (ref 0–0.4)
EOSINOPHIL NFR BLD AUTO: 8 % (ref 0.3–6.2)
ERYTHROCYTE [DISTWIDTH] IN BLOOD BY AUTOMATED COUNT: 14.3 % (ref 12.3–15.4)
ERYTHROCYTE [SEDIMENTATION RATE] IN BLOOD: 85 MM/HR (ref 0–20)
GLOBULIN UR ELPH-MCNC: 3.6 GM/DL
GLUCOSE BLDC GLUCOMTR-MCNC: 111 MG/DL (ref 70–130)
GLUCOSE BLDC GLUCOMTR-MCNC: 129 MG/DL (ref 70–130)
GLUCOSE BLDC GLUCOMTR-MCNC: 135 MG/DL (ref 70–130)
GLUCOSE BLDC GLUCOMTR-MCNC: 156 MG/DL (ref 70–130)
GLUCOSE SERPL-MCNC: 97 MG/DL (ref 65–99)
HCT VFR BLD AUTO: 37 % (ref 37.5–51)
HGB BLD-MCNC: 11.8 G/DL (ref 13–17.7)
IMM GRANULOCYTES # BLD AUTO: 0.02 10*3/MM3 (ref 0–0.05)
IMM GRANULOCYTES NFR BLD AUTO: 0.3 % (ref 0–0.5)
LYMPHOCYTES # BLD AUTO: 1.35 10*3/MM3 (ref 0.7–3.1)
LYMPHOCYTES NFR BLD AUTO: 22.6 % (ref 19.6–45.3)
MAGNESIUM SERPL-MCNC: 1.9 MG/DL (ref 1.6–2.4)
MCH RBC QN AUTO: 30.9 PG (ref 26.6–33)
MCHC RBC AUTO-ENTMCNC: 31.9 G/DL (ref 31.5–35.7)
MCV RBC AUTO: 96.9 FL (ref 79–97)
MONOCYTES # BLD AUTO: 0.45 10*3/MM3 (ref 0.1–0.9)
MONOCYTES NFR BLD AUTO: 7.5 % (ref 5–12)
NEUTROPHILS NFR BLD AUTO: 3.6 10*3/MM3 (ref 1.7–7)
NEUTROPHILS NFR BLD AUTO: 60.4 % (ref 42.7–76)
NRBC BLD AUTO-RTO: 0 /100 WBC (ref 0–0.2)
PLATELET # BLD AUTO: 174 10*3/MM3 (ref 140–450)
PMV BLD AUTO: 10.8 FL (ref 6–12)
POTASSIUM SERPL-SCNC: 4.1 MMOL/L (ref 3.5–5.2)
PROT SERPL-MCNC: 6.1 G/DL (ref 6–8.5)
RBC # BLD AUTO: 3.82 10*6/MM3 (ref 4.14–5.8)
SODIUM SERPL-SCNC: 138 MMOL/L (ref 136–145)
WBC NRBC COR # BLD AUTO: 5.97 10*3/MM3 (ref 3.4–10.8)

## 2024-12-12 PROCEDURE — 25010000002 CEFTRIAXONE PER 250 MG: Performed by: INTERNAL MEDICINE

## 2024-12-12 PROCEDURE — 85652 RBC SED RATE AUTOMATED: CPT | Performed by: INTERNAL MEDICINE

## 2024-12-12 PROCEDURE — 82948 REAGENT STRIP/BLOOD GLUCOSE: CPT

## 2024-12-12 PROCEDURE — 86140 C-REACTIVE PROTEIN: CPT | Performed by: INTERNAL MEDICINE

## 2024-12-12 PROCEDURE — 80053 COMPREHEN METABOLIC PANEL: CPT | Performed by: INTERNAL MEDICINE

## 2024-12-12 PROCEDURE — 83735 ASSAY OF MAGNESIUM: CPT | Performed by: INTERNAL MEDICINE

## 2024-12-12 PROCEDURE — 99231 SBSQ HOSP IP/OBS SF/LOW 25: CPT | Performed by: INTERNAL MEDICINE

## 2024-12-12 PROCEDURE — 85025 COMPLETE CBC W/AUTO DIFF WBC: CPT | Performed by: INTERNAL MEDICINE

## 2024-12-12 PROCEDURE — 97530 THERAPEUTIC ACTIVITIES: CPT

## 2024-12-12 PROCEDURE — 63710000001 INSULIN LISPRO (HUMAN) PER 5 UNITS: Performed by: INTERNAL MEDICINE

## 2024-12-12 PROCEDURE — 25010000002 ENOXAPARIN PER 10 MG

## 2024-12-12 RX ADMIN — FLUTICASONE PROPIONATE 2 SPRAY: 50 SPRAY, METERED NASAL at 21:52

## 2024-12-12 RX ADMIN — PRAVASTATIN SODIUM 40 MG: 40 TABLET ORAL at 09:24

## 2024-12-12 RX ADMIN — COLCHICINE 0.6 MG: 0.6 TABLET ORAL at 09:24

## 2024-12-12 RX ADMIN — Medication 10 ML: at 21:52

## 2024-12-12 RX ADMIN — METOPROLOL TARTRATE 50 MG: 50 TABLET, FILM COATED ORAL at 21:52

## 2024-12-12 RX ADMIN — PANTOPRAZOLE SODIUM 40 MG: 40 TABLET, DELAYED RELEASE ORAL at 06:09

## 2024-12-12 RX ADMIN — SERTRALINE HYDROCHLORIDE 50 MG: 50 TABLET ORAL at 09:24

## 2024-12-12 RX ADMIN — ASPIRIN 81 MG: 81 TABLET, COATED ORAL at 09:24

## 2024-12-12 RX ADMIN — DONEPEZIL HYDROCHLORIDE 10 MG: 10 TABLET, FILM COATED ORAL at 21:52

## 2024-12-12 RX ADMIN — MEMANTINE 10 MG: 10 TABLET ORAL at 09:24

## 2024-12-12 RX ADMIN — METOPROLOL TARTRATE 50 MG: 50 TABLET, FILM COATED ORAL at 09:24

## 2024-12-12 RX ADMIN — MEMANTINE 10 MG: 10 TABLET ORAL at 21:52

## 2024-12-12 RX ADMIN — SODIUM CHLORIDE 2000 MG: 900 INJECTION INTRAVENOUS at 09:24

## 2024-12-12 RX ADMIN — Medication 10 ML: at 09:24

## 2024-12-12 RX ADMIN — FLUTICASONE PROPIONATE 2 SPRAY: 50 SPRAY, METERED NASAL at 09:24

## 2024-12-12 RX ADMIN — FINASTERIDE 5 MG: 5 TABLET, FILM COATED ORAL at 09:24

## 2024-12-12 RX ADMIN — ENOXAPARIN SODIUM 40 MG: 100 INJECTION SUBCUTANEOUS at 21:52

## 2024-12-12 RX ADMIN — INSULIN LISPRO 2 UNITS: 100 INJECTION, SOLUTION INTRAVENOUS; SUBCUTANEOUS at 11:21

## 2024-12-12 NOTE — THERAPY TREATMENT NOTE
Patient Name: Darien Camarena  : 1935    MRN: [id number]                              Today's Date: 2024       Admit Date: 2024    Visit Dx:     ICD-10-CM ICD-9-CM   1. Orthostasis  I95.1 458.0   2. Light headedness  R42 780.4   3. Chronic atrial fibrillation with rapid ventricular response  I48.20 427.31   4. Acute on chronic urinary retention  R33.9 788.29     Patient Active Problem List   Diagnosis    Type 2 diabetes mellitus with stage 3 chronic kidney disease, without long-term current use of insulin    Gastroesophageal reflux disease with esophagitis    Hyperlipidemia LDL goal <100    Essential hypertension    Nephrolithiasis    Obstructive sleep apnea syndrome    Permanent atrial fibrillation    Stage 3 chronic kidney disease    Coronary artery disease involving native coronary artery of native heart without angina pectoris    Malignant neoplasm of lower lobe of left lung    H/O: GI bleed    Presence of Watchman left atrial appendage closure device    Orthostatic hypotension    Heart failure with mildly reduced ejection fraction (HFmrEF)    Obesity    Choledocholithiasis    Severe malnutrition    Esophagitis    Encounter for removal of biliary stent    Acute on chronic urinary retention     Past Medical History:   Diagnosis Date    Arrhythmia     Atrial fibrillation     Chronic kidney disease     COPD (chronic obstructive pulmonary disease)     Coronary artery disease     Dementia     Diabetes mellitus     doesnt check sugar    E. coli sepsis 2022    Enlarged prostate without lower urinary tract symptoms (luts) 2016    Full dentures     GERD (gastroesophageal reflux disease)     Gout     Hearing aid worn     bilat prn    History of colonoscopy 2012    History of radiation therapy 2023    SBRT LLL lung    [place] (hard of hearing)     hearing aids prn    Hyperlipidemia     Hypertension     Kidney stone     surgery x1    Lung cancer     STEVEN on CPAP     compliant with  machine    PAF (paroxysmal atrial fibrillation)     Prostatism     Urinary incontinence     Urinary tract infection     Wears glasses     readers     Past Surgical History:   Procedure Laterality Date    ATRIAL APPENDAGE EXCLUSION LEFT WITH TRANSESOPHAGEAL ECHOCARDIOGRAM N/A 11/28/2023    Procedure: Atrial Appendage Occlusion;  Surgeon: Anthony Mcdaniel MD;  Location: [facility] EP INVASIVE LOCATION;  Service: Cardiovascular;  Laterality: N/A;    CARDIAC CATHETERIZATION Left 09/29/2022    Procedure: Left Heart Cath;  Surgeon: Titus Oliveros IV, MD;  Location:  [facility] CATH INVASIVE LOCATION;  Service: Cardiovascular;  Laterality: Left;    CARDIOVERSION      CATARACT EXTRACTION Bilateral     COLONOSCOPY      CYSTOSCOPY      ENDOSCOPY      possible    ENDOSCOPY N/A 9/5/2024    Procedure: ESOPHAGOGASTRODUODENOSCOPY;  Surgeon: Anthony Arambula MD;  Location:  [facility];  Service: Gastroenterology;  Laterality: N/A;  Esophagus dilated to 18mm with 12mm-mm to 15mm, then 15mm to 18mm balloon.    ENDOSCOPY N/A 10/31/2024    Procedure: ESOPHAGOGASTRODUODENOSCOPY WITH BIOPSY;  Surgeon: Carlos Dior MD;  Location:  [facility];  Service: Gastroenterology;  Laterality: N/A;    ERCP N/A 9/5/2024    Procedure: ENDOSCOPIC RETROGRADE CHOLANGIOPANCREATOGRAPHY;  Surgeon: Anthony Arambula MD;  Location:  [facility];  Service: Gastroenterology;  Laterality: N/A;  Sphincterotomy made to ampula of common bile duct (CBD), CBD swept with 9mm-12mm balloon - sludge, stones and purulent appearing drainage extracted. 10x7 [place] biliary stent depolyed into CBD. ERCP scope removed with balloon intact.    ERCP N/A 10/31/2024    Procedure: ENDOSCOPIC RETROGRADE CHOLANGIOPANCREATOGRAPHY;  Surgeon: Carlos Dior MD;  Location:  [facility];  Service: Gastroenterology;  Laterality: N/A;    KIDNEY STONE SURGERY      x1      General Information       Row Name 12/12/24 1525          Physical Therapy Time  and Intention    Document Type therapy note (daily note)  -KG     Mode of Treatment physical therapy  -KG       Row Name 12/12/24 1526          General Information    Existing Precautions/Restrictions fall;other (see comments)  hx of orthostatic hypotension  -KG     Barriers to Rehab none identified  -KG       Row Name 12/12/24 1526          Cognition    Orientation Status (Cognition) oriented x 4  -KG       Row Name 12/12/24 1526          Safety Issues/Impairments Affecting Functional Mobility    Safety Issues Affecting Function (Mobility) awareness of need for assistance;insight into deficits/self-awareness;safety precaution awareness;safety precautions follow-through/compliance  -KG     Impairments Affecting Function (Mobility) balance;coordination;endurance/activity tolerance;postural/trunk control;strength  -KG               User Key  (r) = Recorded By, (t) = Taken By, (c) = Cosigned By      Initials Name Provider Type    KG Eugenie Mckoy, PT Physical Therapist                   Mobility       Row Name 12/12/24 1526          Bed Mobility    Bed Mobility supine-sit;sit-supine  -KG     Supine-Sit [place] (Bed Mobility) standby assist;verbal cues  -KG     Sit-Supine [place] (Bed Mobility) standby assist;verbal cues  -KG     Assistive Device (Bed Mobility) head of bed elevated  -KG     Comment, (Bed Mobility) VC's for sequencing. Pt did not require any physical assistance.  -KG       Row Name 12/12/24 1526          Transfers    Comment, (Transfers) VC's for sequencing and safe hand placement.  -KG       Row Name 12/12/24 1526          Sit-Stand Transfer    Sit-Stand [place] (Transfers) contact guard;verbal cues  -KG     Assistive Device (Sit-Stand Transfers) walker, front-wheeled  -KG       Row Name 12/12/24 1526          Gait/Stairs (Locomotion)    [place] Level (Gait) contact guard;verbal cues  -KG     Assistive Device (Gait) walker, front-wheeled  -KG     Distance in Feet (Gait) 30   +30+30  -KG     Deviations/Abnormal Patterns (Gait) marilyn decreased;stride length decreased  -KG     Bilateral Gait Deviations forward flexed posture;heel strike decreased  -KG     Comment, (Gait/Stairs) Pt demonstrated step through gait pattern with slow marilyn and decreased step length. VC's for upright posture and to keep RW close with feet inside middle. Pt demonstrated increased difficulty remaining in frame of walker during turns. Unsteady at times, but no LOB. Limited by increased fatigue.  -KG               User Key  (r) = Recorded By, (t) = Taken By, (c) = Cosigned By      Initials Name Provider Type    KG Eugenie Mckoy, PT Physical Therapist                   Obj/Interventions       Row Name 12/12/24 1527          Balance    Balance Assessment sitting static balance;standing static balance;standing dynamic balance  -KG     Static Sitting Balance standby assist  -KG     Position, Sitting Balance unsupported;sitting edge of bed  -KG     Static Standing Balance contact guard  -KG     Dynamic Standing Balance contact guard  -KG     Position/Device Used, Standing Balance supported;walker, rolling  -KG               User Key  (r) = Recorded By, (t) = Taken By, (c) = Cosigned By      Initials Name Provider Type    KG Eugenie Mckoy, PT Physical Therapist                   Goals/Plan    No documentation.                  Clinical Impression       Row Name 12/12/24 1528          Pain    Pretreatment Pain Rating 0/10 - no pain  -KG     Posttreatment Pain Rating 0/10 - no pain  -KG       Row Name 12/12/24 1528          Plan of Care Review    Plan of Care Reviewed With patient  -KG     Progress improving  -KG     Outcome Evaluation Pt ambulated 30ft +30ft+ 30ft with RW and CGA. VC's for upright posture with forward gaze and proper management of RW. Pt with tendency to get outside of frame of RW during turns. Unsteady at times, but no LOB. Pt's ambulation distance limited by fatigue. Continue to  recommend PT skilled care and D/C to IP rehab facility.  -KG       Row Name 12/12/24 1528          Vital Signs    Pre Systolic BP Rehab 139  -KG     Pre Treatment Diastolic   -KG     Pretreatment Heart Rate (beats/min) 120  -KG     Posttreatment Heart Rate (beats/min) 122  -KG     Pre SpO2 (%) 91  -KG     O2 Delivery Pre Treatment room air  -KG     Post SpO2 (%) 92  -KG     O2 Delivery Post Treatment room air  -KG     Pre Patient Position Supine  -KG     Intra Patient Position Standing  -KG     Post Patient Position Supine  -KG       Row Name 12/12/24 1528          Positioning and Restraints    Pre-Treatment Position in bed  -KG     Post Treatment Position bed  -KG     In Bed notified nsg;supine;call light within reach;encouraged to call for assist;exit alarm on;side rails up x2  -KG               User Key  (r) = Recorded By, (t) = Taken By, (c) = Cosigned By      Initials Name Provider Type    Eugenie Dominguez, PT Physical Therapist                   Outcome Measures       Row Name 12/12/24 1529          How much help from another person do you currently need...    Turning from your back to your side while in flat bed without using bedrails? 3  -KG     Moving from lying on back to sitting on the side of a flat bed without bedrails? 3  -KG     Moving to and from a bed to a chair (including a wheelchair)? 3  -KG     Standing up from a chair using your arms (e.g., wheelchair, bedside chair)? 3  -KG     Climbing 3-5 steps with a railing? 2  -KG     To walk in hospital room? 3  -KG     AM-PAC 6 Clicks Score (PT) 17  -KG     Highest Level of Mobility Goal 5 --> Static standing  -KG       Row Name 12/12/24 1529          Functional Assessment    Outcome Measure Options AM-PAC 6 Clicks Basic Mobility (PT)  -KG               User Key  (r) = Recorded By, (t) = Taken By, (c) = Cosigned By      Initials Name Provider Type    Eugenie Dominguez PT Physical Therapist                                 Physical  Therapy Education       Title: PT OT SLP Therapies (In Progress)       Topic: Physical Therapy (Done)       Point: Mobility training (Done)       Learning Progress Summary            Patient Acceptance, E, VU,NR by KG at 12/12/2024 1137    Acceptance, E, VU by NS at 12/9/2024 1616    Comment: PT POC, discharge options    Acceptance, E, VU by ER at 12/6/2024 0934    Comment: making sure he gets enough fluids in/absorption to help orthostatics, transfer safety, d/c planning                      Point: Home exercise program (Done)       Learning Progress Summary            Patient Acceptance, E, VU,NR by KG at 12/12/2024 1137    Acceptance, E, VU by NS at 12/9/2024 1616    Comment: PT POC, discharge options    Acceptance, E, VU by ER at 12/6/2024 0934    Comment: making sure he gets enough fluids in/absorption to help orthostatics, transfer safety, d/c planning                      Point: Body mechanics (Done)       Learning Progress Summary            Patient Acceptance, E, VU,NR by KG at 12/12/2024 1137    Acceptance, E, VU by NS at 12/9/2024 1616    Comment: PT POC, discharge options    Acceptance, E, VU by ER at 12/6/2024 0934    Comment: making sure he gets enough fluids in/absorption to help orthostatics, transfer safety, d/c planning                      Point: Precautions (Done)       Learning Progress Summary            Patient Acceptance, E, VU,NR by KG at 12/12/2024 1137    Acceptance, E, VU by NS at 12/9/2024 1616    Comment: PT POC, discharge options    Acceptance, E, VU by ER at 12/6/2024 0934    Comment: making sure he gets enough fluids in/absorption to help orthostatics, transfer safety, d/c planning                                      User Key       Initials Effective Dates Name Provider Type Discipline    KG 05/22/20 -  Eugenie Mckoy, PT Physical Therapist PT    NS 06/16/21 -  Arlin Mosquera, PT Physical Therapist PT    ER 11/04/24 -  Cynthia Allison, PT Physical Therapist PT                   PT Recommendation and Plan     Progress: improving  Outcome Evaluation: Pt ambulated 30ft +30ft+ 30ft with RW and CGA. VC's for upright posture with forward gaze and proper management of RW. Pt with tendency to get outside of frame of RW during turns. Unsteady at times, but no LOB. Pt's ambulation distance limited by fatigue. Continue to recommend PT skilled care and D/C to IP rehab facility.     Time Calculation:         PT Charges       Row Name 12/12/24 1137             Time Calculation    Start Time 1137  -KG      PT Received On 12/12/24  -KG      PT Goal Re-Cert Due Date 12/16/24  -KG         Time Calculation- PT    Total Timed Code Minutes- PT 24 minute(s)  -KG         Timed Charges    46668 - PT Therapeutic Activity Minutes 24  -KG         Total Minutes    Timed Charges Total Minutes 24  -KG       Total Minutes 24  -KG                User Key  (r) = Recorded By, (t) = Taken By, (c) = Cosigned By      Initials Name Provider Type    KG Eugenie Mckoy, PT Physical Therapist                  Therapy Charges for Today       Code Description Service Date Service Provider Modifiers Qty    [id number]  PT THERAPEUTIC ACT EA 15 MIN 12/12/2024 Eugenie Mckoy, PT GP 2            PT G-Codes  Outcome Measure Options: AM-PAC 6 Clicks Basic Mobility (PT)  AM-PAC 6 Clicks Score (PT): 17  AM-PAC 6 Clicks Score (OT): 20       Yessenia Mckoy PT  12/12/2024

## 2024-12-12 NOTE — CASE MANAGEMENT/SOCIAL WORK
Continued Stay Note  [facility]     Patient Name: Darien Camarena  MRN: [id number]  Today's Date: 12/12/2024    Admit Date: 12/5/2024    Plan: Rehab   Discharge Plan       Row Name 12/12/24 1149       Plan    Plan Rehab    Patient/Family in Agreement with Plan yes    Plan Comments The plan for Mr. Camarena is rehab. CM called and spoke with Kristy, Liaison with the [facility]. She said, she would check on bed availability at [facility]. [facility] has no bed available. I also called and spoke with Veronique at [facility] and she will review referral and get back with CM. CM will continue to follow and will assist with any discharge needs as indicated.    Final Discharge Disposition Code 03 - skilled nursing facility (SNF)                   Discharge Codes    No documentation.                 Expected Discharge Date and Time       Expected Discharge Date Expected Discharge Time    Dec 12, 2024               Leandra Lion RN

## 2024-12-12 NOTE — PLAN OF CARE
Goal Outcome Evaluation:  Plan of Care Reviewed With: patient        Progress: improving  Outcome Evaluation: Pt ambulated 30ft +30ft+ 30ft with RW and CGA. VC's for upright posture with forward gaze and proper management of RW. Pt with tendency to get outside of frame of RW during turns. Unsteady at times, but no LOB. Pt's ambulation distance limited by fatigue. Continue to recommend PT skilled care and D/C to IP rehab facility.

## 2024-12-12 NOTE — PROGRESS NOTES
[facility] Medicine Services  PROGRESS NOTE    Patient Name: Darien Camarena  : 1935  MRN: [id number]    Date of Admission: 2024  Primary Care Physician: Pito Way MD    Subjective   Subjective     CC:  Follow-up for weakness    HPI:  Patient seen and examined this morning.  Comfortable in bed.  Patient with no acute complaints today.  No acute events overnight.  Pain in his foot resolved.  Awaiting SNF    Objective   Objective     Vital Signs:   Temp:  [97.2 °F (36.2 °C)-98 °F (36.7 °C)] 97.9 °F (36.6 °C)  Heart Rate:  [] 96  Resp:  [16-18] 16  BP: (115-141)/() 139/105     Physical Exam:  General: Comfortable, not in distress, conversant and cooperative  Head: Atraumatic and normocephalic  Eyes: No Icterus. No pallor  Ears:  Ears appear intact with no abnormalities noted  Throat: No oral lesions, no thrush  Neck: Supple, trachea midline  Lungs: Clear to auscultation bilaterally, equal air entry, no wheezing or crackles  Heart:  Normal S1 and S2, no murmur, no gallop, No JVD, no lower extremity swelling  Abdomen:  Soft, no tenderness, no organomegaly, normal bowel sounds, no organomegaly  Extremities: pulses equal bilaterally  Skin: No bleeding, bruising or rash, normal skin turgor and elasticity  Neurologic: Cranial nerves appear intact with no evidence of facial asymmetry, normal motor and sensory functions in all 4 extremities  Psych: Alert and oriented x 3, normal mood    Results Reviewed:  LAB RESULTS:      Lab 24  0410 24  0619 24  0439 24  0430 24  1350   WBC 5.97 8.21 6.83 6.15  --    HEMOGLOBIN 11.8* 11.2* 11.3* 11.3*  --    HEMATOCRIT 37.0* 35.2* 36.6* 35.3*  --    PLATELETS 174 159 154 149  --    NEUTROS ABS 3.60  --  4.60 4.15  --    IMMATURE GRANS (ABS) 0.02  --  0.02 0.01  --    LYMPHS ABS 1.35  --  1.32 1.29  --    MONOS ABS 0.45  --  0.45 0.39  --    EOS ABS 0.48*  --  0.39 0.26  --    MCV 96.9 97.5*  99.2* 98.3*  --    SED RATE 85* 74*  --   --   --    CRP 2.65* 3.55*  --   --   --    LACTATE  --   --   --   --  2.9*         Lab 12/12/24  0410 12/09/24  0619 12/08/24 0439 12/07/24  0430 12/06/24  0835   SODIUM 138 138 138 141 138   POTASSIUM 4.1 4.4 4.2 3.9 4.4   CHLORIDE 105 105 106 105 105   CO2 26.0 27.0 27.0 27.0 27.0   ANION GAP 7.0 6.0 5.0 9.0 6.0   BUN 16 16 21 21 31*   CREATININE 0.64* 0.60* 0.68* 0.66* 0.94   EGFR 91.1 92.8 89.4 90.2 78.0   GLUCOSE 97 97 124* 105* 119*   CALCIUM 8.6 8.9 8.5* 8.6 8.6   MAGNESIUM 1.9  --   --  1.7  --    PHOSPHORUS  --   --   --  3.1  --          Lab 12/12/24 0410 12/08/24 0439 12/07/24  0430   TOTAL PROTEIN 6.1 5.7* 5.9*   ALBUMIN 2.5* 2.7* 2.8*   GLOBULIN 3.6 3.0 3.1   ALT (SGPT) 31 15 12   AST (SGOT) 57* 33 25   BILIRUBIN 0.4 0.5 0.7   ALK PHOS 172* 167* 166*                       Brief Urine Lab Results  (Last result in the past 365 days)        Color   Clarity   Blood   Leuk Est   Nitrite   Protein   CREAT   Urine HCG        12/05/24 1432 Yellow   Clear   Negative   Moderate (2+)   Positive   Negative                   Microbiology Results Abnormal       Procedure Component Value - Date/Time    Urine Culture - Urine, Urine, Catheter [[id number]]  (Abnormal) Collected: 12/05/24 1432    Lab Status: Final result Specimen: Urine, Catheter Updated: 12/07/24 1028     Urine Culture >100,000 CFU/mL Escherichia coli    Narrative:      Refer to previous urine culture collected on 12/05/2024 1610 for MICs    Colonization of the urinary tract without infection is common. Treatment is discouraged unless the patient is symptomatic, pregnant, or undergoing an invasive urologic procedure.            No radiology results from the last 24 hrs    Results for orders placed during the hospital encounter of 11/21/24    Adult Transesophageal Echo 3D (FARHAD) W/ Cont If Necessary Per Protocol    Interpretation Summary    There is a 27mm Watchman FLX device in place. It appears well seated  and well compressed with no device related thrombus seen. There is a small jet of color flow, 2.5 mm, at the superior aspect adjacent to the left upper pulmonary vein. This was not seen previous examination however image quality is improved compared to prior.    Left ventricular systolic function is moderately decreased. Left ventricular ejection fraction appears to be 36 - 40%. Normal left ventricular wall thickness noted. The left ventricular cavity is dilated. There is left ventricular global hypokinesis noted.    : The right ventricular cavity is mildly dilated. Mildly reduced right ventricular systolic function noted.    : The left atrial cavity is severely dilated. No evidence of a left atrial thrombus present. There is light spontaneous echo contrast present in the atrial body.    The right atrial cavity is severely dilated.    There is mild, bileaflet mitral valve thickening present. Mild mitral valve regurgitation is present. No significant mitral valve stenosis is present.    Mild tricuspid valve regurgitation is present. Estimated right ventricular systolic pressure from tricuspid regurgitation is mildly elevated (35-45 mmHg).    There is moderate pulmonic valve regurgitation present.      Current medications:  Scheduled Meds:aspirin, 81 mg, Oral, Daily  colchicine, 0.6 mg, Oral, Daily  donepezil, 10 mg, Oral, Nightly  enoxaparin, 40 mg, Subcutaneous, Nightly  finasteride, 5 mg, Oral, Daily  fluticasone, 2 spray, Each Nare, BID  insulin lispro, 2-7 Units, Subcutaneous, 4x Daily AC & at Bedtime  memantine, 10 mg, Oral, BID  metoprolol tartrate, 50 mg, Oral, Q12H  pantoprazole, 40 mg, Oral, Q AM  pravastatin, 40 mg, Oral, Daily  sertraline, 50 mg, Oral, Daily  sodium chloride, 10 mL, Intravenous, Q12H      Continuous Infusions:     PRN Meds:.  acetaminophen    Albuterol Sulfate NEB Orderable    senna-docusate sodium **AND** polyethylene glycol **AND** bisacodyl **AND** bisacodyl    Calcium Replacement -  Follow Nurse / BPA Driven Protocol    dextrose    dextrose    glucagon (human recombinant)    Magnesium Standard Dose Replacement - Follow Nurse / BPA Driven Protocol    melatonin    ondansetron    Phosphorus Replacement - Follow Nurse / BPA Driven Protocol    Potassium Replacement - Follow Nurse / BPA Driven Protocol    sodium chloride    sodium chloride    sodium chloride    Assessment & Plan   Assessment & Plan     Active Hospital Problems    Diagnosis  POA    **Orthostatic hypotension [I95.1]  Yes    Acute on chronic urinary retention [R33.9]  Yes    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 male with history of chronic atrial fibrillation s/p watchman, CKD stage III, chronic urinary retention with self caths, HFmr EF, type 2 diabetes, hypertension hyperlipidemia.  Patient sent from urology clinic with complaints of dizziness and orthostatic hypotension    Acute UTI, POA  Chronic urine retention with severe left hydronephrosis, improved  Patient with known history of chronic retention.  He self cath 4 times a day.  Have been having difficulty self cathing likely secondary to developing urethral stricture  Seen at urology clinic and sent to the hospital because of hypotension  UA is concerning for UTI.  Urine culture with pansensitive E. coli.  Continue Rocephin and transition to p.o. cefdinir upon discharge  Urology following  Had a Del Cid catheter placed per urology recs.  Repeat CT abdomen after Del Cid catheter placement showed complete resolution of the severe left hydronephrosis.  Dr. Larkin /urology recommended to discharge the patient on Del Cid catheter and follow-up with him in  the clinic in 2 weeks    Orthostatic hypotension, resolved  Likely secondary to poor oral intake over the last few days   Improved with IV fluids  Continue metoprolol     HFmrEF, compensated  Paroxysmal A-fib  Echo from 11/21 with EF 35-to 40%  Recently had a recent increase in Lasix due to lower extremity edema.  Lasix currently on hold because of hypotension  Continue metoprolol and amiodarone for rate control   Not on anticoagulation.  Status post Watchman device.  Continue aspirin alone  Appreciate help from cardiology team    Right foot pain secondary to gout flare, improved  CRP and sedimentation rate are elevated  X-ray with advanced arthritis but no other acute findings or fractures  Pain improved with colchicine .  Continue treatment for 2 weeks    CKD stage III  Creatinine stable at baseline.  Continue to monitor     Well-controlled type 2 diabetes   A1c 5.9%  Continue SSI      Dementia and depression  Continue Aricept and Namenda  Continue Zoloft     Chronic weakness  Case management consulted for short-term rehab      Expected Discharge Location and Transportation: [facility]  Expected Discharge   Expected Discharge Date: 12/13/2024; Expected Discharge Time:      VTE Prophylaxis:  Pharmacologic & mechanical VTE prophylaxis orders are present.         AM-PAC 6 Clicks Score (PT): 17 (12/11/24 2010)    CODE STATUS:   Code Status and Medical Interventions: CPR (Attempt to Resuscitate); Full Support   Ordered at: 12/10/24 1050     Level Of Support Discussed With:    Patient     Code Status (Patient has no pulse and is not breathing):    CPR (Attempt to Resuscitate)     Medical Interventions (Patient has pulse or is breathing):    Full Support       Clarence Samaniego MD  12/12/24

## 2024-12-12 NOTE — PLAN OF CARE
Goal Outcome Evaluation:  Plan of Care Reviewed With: patient        Progress: improving  Outcome Evaluation: Patient remains on room air. Atrial fibrillation on monitor. Vital signs stable. Patient reported headache. Notified provider and received order for prn acetamenophen, successful in improving headache. No other complaints this shift. No acute events.

## 2024-12-13 LAB
GLUCOSE BLDC GLUCOMTR-MCNC: 110 MG/DL (ref 70–130)
GLUCOSE BLDC GLUCOMTR-MCNC: 112 MG/DL (ref 70–130)
GLUCOSE BLDC GLUCOMTR-MCNC: 140 MG/DL (ref 70–130)
GLUCOSE BLDC GLUCOMTR-MCNC: 158 MG/DL (ref 70–130)

## 2024-12-13 PROCEDURE — 97530 THERAPEUTIC ACTIVITIES: CPT

## 2024-12-13 PROCEDURE — 82948 REAGENT STRIP/BLOOD GLUCOSE: CPT

## 2024-12-13 PROCEDURE — 63710000001 INSULIN LISPRO (HUMAN) PER 5 UNITS: Performed by: INTERNAL MEDICINE

## 2024-12-13 PROCEDURE — 25010000002 ENOXAPARIN PER 10 MG

## 2024-12-13 PROCEDURE — 97535 SELF CARE MNGMENT TRAINING: CPT

## 2024-12-13 RX ADMIN — MEMANTINE 10 MG: 10 TABLET ORAL at 08:42

## 2024-12-13 RX ADMIN — MEMANTINE 10 MG: 10 TABLET ORAL at 20:35

## 2024-12-13 RX ADMIN — ACETAMINOPHEN 650 MG: 325 TABLET ORAL at 00:51

## 2024-12-13 RX ADMIN — DONEPEZIL HYDROCHLORIDE 10 MG: 10 TABLET, FILM COATED ORAL at 20:35

## 2024-12-13 RX ADMIN — METOPROLOL TARTRATE 50 MG: 50 TABLET, FILM COATED ORAL at 20:35

## 2024-12-13 RX ADMIN — FLUTICASONE PROPIONATE 2 SPRAY: 50 SPRAY, METERED NASAL at 08:41

## 2024-12-13 RX ADMIN — INSULIN LISPRO 2 UNITS: 100 INJECTION, SOLUTION INTRAVENOUS; SUBCUTANEOUS at 12:47

## 2024-12-13 RX ADMIN — PANTOPRAZOLE SODIUM 40 MG: 40 TABLET, DELAYED RELEASE ORAL at 05:32

## 2024-12-13 RX ADMIN — Medication 10 ML: at 20:34

## 2024-12-13 RX ADMIN — SERTRALINE HYDROCHLORIDE 50 MG: 50 TABLET ORAL at 08:41

## 2024-12-13 RX ADMIN — PRAVASTATIN SODIUM 40 MG: 40 TABLET ORAL at 08:42

## 2024-12-13 RX ADMIN — FLUTICASONE PROPIONATE 2 SPRAY: 50 SPRAY, METERED NASAL at 20:34

## 2024-12-13 RX ADMIN — COLCHICINE 0.6 MG: 0.6 TABLET ORAL at 08:41

## 2024-12-13 RX ADMIN — Medication 10 ML: at 08:42

## 2024-12-13 RX ADMIN — FINASTERIDE 5 MG: 5 TABLET, FILM COATED ORAL at 08:42

## 2024-12-13 RX ADMIN — ASPIRIN 81 MG: 81 TABLET, COATED ORAL at 08:42

## 2024-12-13 RX ADMIN — ENOXAPARIN SODIUM 40 MG: 100 INJECTION SUBCUTANEOUS at 20:34

## 2024-12-13 RX ADMIN — METOPROLOL TARTRATE 50 MG: 50 TABLET, FILM COATED ORAL at 08:42

## 2024-12-13 NOTE — PLAN OF CARE
Problem: Adult Inpatient Plan of Care  Goal: Plan of Care Review  Outcome: Progressing  Flowsheets (Taken 12/12/2024 1924)  Progress: improving  Goal: Patient-Specific Goal (Individualized)  Outcome: Progressing  Goal: Absence of Hospital-Acquired Illness or Injury  Outcome: Progressing  Intervention: Identify and Manage Fall Risk  Recent Flowsheet Documentation  Taken 12/12/2024 1600 by Yoni Davila RN  Safety Promotion/Fall Prevention:   activity supervised   assistive device/personal items within reach   clutter free environment maintained   fall prevention program maintained   nonskid shoes/slippers when out of bed   safety round/check completed   room organization consistent  Taken 12/12/2024 1400 by Yoni Davila, RN  Safety Promotion/Fall Prevention:   activity supervised   clutter free environment maintained   assistive device/personal items within reach   fall prevention program maintained   nonskid shoes/slippers when out of bed   room organization consistent   safety round/check completed  Taken 12/12/2024 1200 by Yoni Davila, RN  Safety Promotion/Fall Prevention:   activity supervised   assistive device/personal items within reach   clutter free environment maintained   fall prevention program maintained   nonskid shoes/slippers when out of bed   safety round/check completed   room organization consistent  Taken 12/12/2024 1000 by Yoni Davila, RN  Safety Promotion/Fall Prevention:   assistive device/personal items within reach   activity supervised   clutter free environment maintained   fall prevention program maintained   nonskid shoes/slippers when out of bed   room organization consistent   safety round/check completed  Taken 12/12/2024 0800 by Yoni Davila, RN  Safety Promotion/Fall Prevention:   activity supervised   assistive device/personal items within reach   clutter free environment maintained   fall prevention program maintained   nonskid shoes/slippers when out of bed    room organization consistent   safety round/check completed  Intervention: Prevent Skin Injury  Recent Flowsheet Documentation  Taken 12/12/2024 1600 by Yoni Davila RN  Body Position: position changed independently  Taken 12/12/2024 1400 by Yoni Davila RN  Body Position: position changed independently  Taken 12/12/2024 1200 by Yoni Davila RN  Body Position: position changed independently  Taken 12/12/2024 1000 by Yoni Davila RN  Body Position: position changed independently  Taken 12/12/2024 0800 by Yoni Davila RN  Body Position: position changed independently  Intervention: Prevent Infection  Recent Flowsheet Documentation  Taken 12/12/2024 1600 by Yoni Davila RN  Infection Prevention:   environmental surveillance performed   hand hygiene promoted   single patient room provided  Taken 12/12/2024 1400 by Yoni Davila RN  Infection Prevention:   environmental surveillance performed   hand hygiene promoted   single patient room provided  Taken 12/12/2024 1200 by Yoni Davila RN  Infection Prevention:   environmental surveillance performed   hand hygiene promoted   single patient room provided  Taken 12/12/2024 1000 by Yoni Davila RN  Infection Prevention:   environmental surveillance performed   hand hygiene promoted   single patient room provided  Taken 12/12/2024 0800 by Yoni Davila RN  Infection Prevention:   environmental surveillance performed   hand hygiene promoted   single patient room provided  Goal: Optimal Comfort and Wellbeing  Outcome: Progressing  Goal: Readiness for Transition of Care  Outcome: Progressing     Problem: Fall Injury Risk  Goal: Absence of Fall and Fall-Related Injury  Outcome: Progressing  Intervention: Identify and Manage Contributors  Recent Flowsheet Documentation  Taken 12/12/2024 1600 by Yoni Davila RN  Medication Review/Management: medications reviewed  Taken 12/12/2024 1400 by Yoni Davila RN  Medication Review/Management:  medications reviewed  Taken 12/12/2024 1200 by Yoni Davila RN  Medication Review/Management: medications reviewed  Taken 12/12/2024 1000 by Yoni Davila RN  Medication Review/Management: medications reviewed  Intervention: Promote Injury-Free Environment  Recent Flowsheet Documentation  Taken 12/12/2024 1600 by Yoni Davila RN  Safety Promotion/Fall Prevention:   activity supervised   assistive device/personal items within reach   clutter free environment maintained   fall prevention program maintained   nonskid shoes/slippers when out of bed   safety round/check completed   room organization consistent  Taken 12/12/2024 1400 by Yoni Davila RN  Safety Promotion/Fall Prevention:   activity supervised   clutter free environment maintained   assistive device/personal items within reach   fall prevention program maintained   nonskid shoes/slippers when out of bed   room organization consistent   safety round/check completed  Taken 12/12/2024 1200 by Yoni Davila RN  Safety Promotion/Fall Prevention:   activity supervised   assistive device/personal items within reach   clutter free environment maintained   fall prevention program maintained   nonskid shoes/slippers when out of bed   safety round/check completed   room organization consistent  Taken 12/12/2024 1000 by Yoni Davila RN  Safety Promotion/Fall Prevention:   assistive device/personal items within reach   activity supervised   clutter free environment maintained   fall prevention program maintained   nonskid shoes/slippers when out of bed   room organization consistent   safety round/check completed  Taken 12/12/2024 0800 by Yoni Davila RN  Safety Promotion/Fall Prevention:   activity supervised   assistive device/personal items within reach   clutter free environment maintained   fall prevention program maintained   nonskid shoes/slippers when out of bed   room organization consistent   safety round/check completed     Problem:  Urinary Retention  Goal: Effective Urinary Elimination  Outcome: Progressing     Problem: Syncope  Goal: Absence of Syncopal Symptoms  Outcome: Progressing  Intervention: Manage Effect of Syncopal Symptoms  Recent Flowsheet Documentation  Taken 12/12/2024 1600 by Yoni Davila RN  Safety Promotion/Fall Prevention:   activity supervised   assistive device/personal items within reach   clutter free environment maintained   fall prevention program maintained   nonskid shoes/slippers when out of bed   safety round/check completed   room organization consistent  Taken 12/12/2024 1400 by Yoni Davila RN  Safety Promotion/Fall Prevention:   activity supervised   clutter free environment maintained   assistive device/personal items within reach   fall prevention program maintained   nonskid shoes/slippers when out of bed   room organization consistent   safety round/check completed  Taken 12/12/2024 1200 by Yoni Davila RN  Safety Promotion/Fall Prevention:   activity supervised   assistive device/personal items within reach   clutter free environment maintained   fall prevention program maintained   nonskid shoes/slippers when out of bed   safety round/check completed   room organization consistent  Taken 12/12/2024 1000 by Yoni Davila RN  Safety Promotion/Fall Prevention:   assistive device/personal items within reach   activity supervised   clutter free environment maintained   fall prevention program maintained   nonskid shoes/slippers when out of bed   room organization consistent   safety round/check completed  Taken 12/12/2024 0800 by Yoni Davila, RN  Safety Promotion/Fall Prevention:   activity supervised   assistive device/personal items within reach   clutter free environment maintained   fall prevention program maintained   nonskid shoes/slippers when out of bed   room organization consistent   safety round/check completed     Problem: Skin Injury Risk Increased  Goal: Skin Health and  Integrity  Outcome: Progressing  Intervention: Optimize Skin Protection  Recent Flowsheet Documentation  Taken 12/12/2024 1600 by Yoni Davila RN  Activity Management: activity encouraged  Head of Bed (HOB) Positioning: HOB elevated  Taken 12/12/2024 1400 by Yoni Davila RN  Activity Management: activity encouraged  Head of Bed (HOB) Positioning: HOB elevated  Taken 12/12/2024 1200 by Yoni Davila RN  Activity Management: activity encouraged  Head of Bed (HOB) Positioning: HOB elevated  Taken 12/12/2024 1000 by Yoni Davila RN  Activity Management: activity encouraged  Head of Bed (HOB) Positioning: HOB elevated  Taken 12/12/2024 0800 by Yoni Davila RN  Activity Management: activity encouraged  Head of Bed (HOB) Positioning: HOB elevated     Problem: Sepsis/Septic Shock  Goal: Optimal Coping  Outcome: Progressing  Goal: Absence of Bleeding  Outcome: Progressing  Goal: Blood Glucose Level Within Target Range  Outcome: Progressing  Goal: Absence of Infection Signs and Symptoms  Outcome: Progressing  Intervention: Initiate Sepsis Management  Recent Flowsheet Documentation  Taken 12/12/2024 1600 by Yoni Davila RN  Infection Prevention:   environmental surveillance performed   hand hygiene promoted   single patient room provided  Taken 12/12/2024 1400 by Yoni Davila RN  Infection Prevention:   environmental surveillance performed   hand hygiene promoted   single patient room provided  Taken 12/12/2024 1200 by Yoni Davila RN  Infection Prevention:   environmental surveillance performed   hand hygiene promoted   single patient room provided  Taken 12/12/2024 1000 by Yoni Davila RN  Infection Prevention:   environmental surveillance performed   hand hygiene promoted   single patient room provided  Taken 12/12/2024 0800 by Yoni Davila RN  Infection Prevention:   environmental surveillance performed   hand hygiene promoted   single patient room provided  Intervention: Promote  Recovery  Recent Flowsheet Documentation  Taken 12/12/2024 1600 by Yoni Davila, RN  Activity Management: activity encouraged  Taken 12/12/2024 1400 by Yoni Davila, RN  Activity Management: activity encouraged  Taken 12/12/2024 1200 by Yoni Davila, RN  Activity Management: activity encouraged  Taken 12/12/2024 1000 by Yoni Davila, RN  Activity Management: activity encouraged  Taken 12/12/2024 0800 by Yoni Davila, RN  Activity Management: activity encouraged  Goal: Optimal Nutrition Delivery  Outcome: Progressing   Goal Outcome Evaluation:  Plan of Care Reviewed With: patient        Progress: improving

## 2024-12-13 NOTE — THERAPY TREATMENT NOTE
Patient Name: Darien Camarena  : 1935    MRN: [id number]                              Today's Date: 2024       Admit Date: 2024    Visit Dx:     ICD-10-CM ICD-9-CM   1. Orthostasis  I95.1 458.0   2. Light headedness  R42 780.4   3. Chronic atrial fibrillation with rapid ventricular response  I48.20 427.31   4. Acute on chronic urinary retention  R33.9 788.29     Patient Active Problem List   Diagnosis    Type 2 diabetes mellitus with stage 3 chronic kidney disease, without long-term current use of insulin    Gastroesophageal reflux disease with esophagitis    Hyperlipidemia LDL goal <100    Essential hypertension    Nephrolithiasis    Obstructive sleep apnea syndrome    Permanent atrial fibrillation    Stage 3 chronic kidney disease    Coronary artery disease involving native coronary artery of native heart without angina pectoris    Malignant neoplasm of lower lobe of left lung    H/O: GI bleed    Presence of Watchman left atrial appendage closure device    Orthostatic hypotension    Heart failure with mildly reduced ejection fraction (HFmrEF)    Obesity    Choledocholithiasis    Severe malnutrition    Esophagitis    Encounter for removal of biliary stent    Acute on chronic urinary retention     Past Medical History:   Diagnosis Date    Arrhythmia     Atrial fibrillation     Chronic kidney disease     COPD (chronic obstructive pulmonary disease)     Coronary artery disease     Dementia     Diabetes mellitus     doesnt check sugar    E. coli sepsis 2022    Enlarged prostate without lower urinary tract symptoms (luts) 2016    Full dentures     GERD (gastroesophageal reflux disease)     Gout     Hearing aid worn     bilat prn    History of colonoscopy 2012    History of radiation therapy 2023    SBRT LLL lung    [place] (hard of hearing)     hearing aids prn    Hyperlipidemia     Hypertension     Kidney stone     surgery x1    Lung cancer     STEVEN on CPAP     compliant with  machine    PAF (paroxysmal atrial fibrillation)     Prostatism     Urinary incontinence     Urinary tract infection     Wears glasses     readers     Past Surgical History:   Procedure Laterality Date    ATRIAL APPENDAGE EXCLUSION LEFT WITH TRANSESOPHAGEAL ECHOCARDIOGRAM N/A 11/28/2023    Procedure: Atrial Appendage Occlusion;  Surgeon: Anthony Mcdaniel MD;  Location: [facility] INVAS[facility];  Service: Cardiovascular;  Laterality: N/A;    CARDIAC CATHETERIZATION Left 09/29/2022    Procedure: Left Heart Cath;  Surgeon: Titus Oliveros IV, MD;  Location:  [facility];  Service: Cardiovascular;  Laterality: Left;    CARDIOVERSION      CATARACT EXTRACTION Bilateral     COLONOSCOPY      CYSTOSCOPY      ENDOSCOPY      possible    ENDOSCOPY N/A 9/5/2024    Procedure: ESOPHAGOGASTRODUODENOSCOPY;  Surgeon: Anthony Arambula MD;  Location:  [facility];  Service: Gastroenterology;  Laterality: N/A;  Esophagus dilated to 18mm with 12mm-mm to 15mm, then 15mm to 18mm balloon.    ENDOSCOPY N/A 10/31/2024    Procedure: ESOPHAGOGASTRODUODENOSCOPY WITH BIOPSY;  Surgeon: Carlos Dior MD;  Location:  [facility];  Service: Gastroenterology;  Laterality: N/A;    ERCP N/A 9/5/2024    Procedure: ENDOSCOPIC RETROGRADE CHOLANGIOPANCREATOGRAPHY;  Surgeon: Anthony Arambula MD;  Location:  [facility];  Service: Gastroenterology;  Laterality: N/A;  Sphincterotomy made to ampula of common bile duct (CBD), CBD swept with 9mm-12mm balloon - sludge, stones and purulent appearing drainage extracted. 10x7 Italian biliary stent depolyed into CBD. ERCP scope removed with balloon intact.    ERCP N/A 10/31/2024    Procedure: ENDOSCOPIC RETROGRADE CHOLANGIOPANCREATOGRAPHY;  Surgeon: Carlos Dior MD;  Location:  [facility];  Service: Gastroenterology;  Laterality: N/A;    KIDNEY STONE SURGERY      x1      General Information       Row Name 12/13/24 1132          OT Time and Intention     Document Type therapy note (daily note)  -     Mode of Treatment occupational therapy;individual therapy  -       Row Name 12/13/24 1138          General Information    Patient Profile Reviewed yes  -     Existing Precautions/Restrictions fall;other (see comments)  hx of orthostatic hypotension  -     Barriers to Rehab none identified  -       Row Name 12/13/24 1138          Cognition    Orientation Status (Cognition) oriented x 4  -       Row Name 12/13/24 1138          Safety Issues/Impairments Affecting Functional Mobility    Safety Issues Affecting Function (Mobility) insight into deficits/self-awareness;positioning of assistive device;safety precaution awareness  -     Impairments Affecting Function (Mobility) balance;endurance/activity tolerance;postural/trunk control;strength  -               User Key  (r) = Recorded By, (t) = Taken By, (c) = Cosigned By      Initials Name Provider Type    KF Coleen Tsai OT Occupational Therapist                     Mobility/ADL's       Row Name 12/13/24 1138          Bed Mobility    Bed Mobility supine-sit  -     Supine-Sit [place] (Bed Mobility) supervision  -     Assistive Device (Bed Mobility) bed rails;head of bed elevated  -       Row Name 12/13/24 1138          Transfers    Transfers sit-stand transfer;stand-sit transfer  -     Comment, (Transfers) Verbal cues for hand placement  -       Row Name 12/13/24 1138          Sit-Stand Transfer    Sit-Stand [place] (Transfers) standby assist;verbal cues  -     Assistive Device (Sit-Stand Transfers) walker, front-wheeled  -       Row Name 12/13/24 1138          Stand-Sit Transfer    Stand-Sit [place] (Transfers) standby assist;verbal cues  -     Assistive Device (Stand-Sit Transfers) walker, front-wheeled  -       Row Name 12/13/24 1138          Functional Mobility    Functional Mobility- Ind. Level contact guard assist  -     Functional Mobility- Device walker,  front-wheeled  -KF     Functional Mobility-Distance (Feet) --  20' + 30' x3  -KF       Row Name 12/13/24 1138          Activities of Daily Living    BADL Assessment/Intervention lower body dressing;upper body dressing;grooming;feeding  -KF       Row Name 12/13/24 1138          Upper Body Dressing Assessment/Training    [place] Level (Upper Body Dressing) don;front opening garment;minimum assist (75% patient effort)  -KF     Position (Upper Body Dressing) edge of bed sitting  -KF       Row Name 12/13/24 1138          Self-Feeding Assessment/Training    [place] Level (Feeding) prepare tray/open items;scoop food and bring to mouth;independent  -KF     Position (Feeding) supported sitting  -KF       Row Name 12/13/24 1138          Lower Body Dressing Assessment/Training    [place] Level (Lower Body Dressing) doff;don;socks;contact guard assist  -KF     Position (Lower Body Dressing) edge of bed sitting  -KF       Row Name 12/13/24 1138          Grooming Assessment/Training    [place] Level (Grooming) wash face, hands;set up;standby assist  -KF     Position (Grooming) sink side;supported standing  -KF     Comment, (Grooming) Verbal cues for RWx placement at sink counter  -KF               User Key  (r) = Recorded By, (t) = Taken By, (c) = Cosigned By      Initials Name Provider Type    Coleen Suresh OT Occupational Therapist                   Obj/Interventions       Row Name 12/13/24 1140          Balance    Balance Assessment sitting static balance;sitting dynamic balance;sit to stand dynamic balance;standing static balance;standing dynamic balance  -KF     Static Sitting Balance standby assist  -KF     Dynamic Sitting Balance contact guard  -KF     Position, Sitting Balance unsupported;sitting edge of bed  -KF     Sit to Stand Dynamic Balance standby assist;verbal cues  -KF     Static Standing Balance standby assist  -KF     Dynamic Standing Balance contact guard  -KF     Position/Device Used,  Standing Balance supported;walker, front-wheeled  -KF     Balance Interventions sitting;standing;sit to stand;supported;static;dynamic;occupation based/functional task  -KF               User Key  (r) = Recorded By, (t) = Taken By, (c) = Cosigned By      Initials Name Provider Type    Coleen Suresh, [place] Occupational Therapist                   Goals/Plan    No documentation.                  Clinical Impression       Row Name 12/13/24 1140          Pain Assessment    Pretreatment Pain Rating 0/10 - no pain  -KF     Posttreatment Pain Rating 0/10 - no pain  -KF       Row Name 12/13/24 1140          Plan of Care Review    Plan of Care Reviewed With patient  -KF     Progress improving  -KF     Outcome Evaluation Pt with good participation and progress toward goals, requiring less assistance for mobility and ADL completion. The pt ambulated a short household distance using a RWx with CGA, nearing SBA. Pt doffed/donned socks with CGA and completed standing sink side grooming ADLs with SBA. The pt remains slightly below his functional baseline with generalized weakness, decreased activity tolerance, and minor balance deficits warranting continued IP OT services. Continue to rec a d/c to IRF for best outcome, but will monitor closely.  -KF       Row Name 12/13/24 1140          Therapy Assessment/Plan (OT)    Rehab Potential (OT) good  -KF     Criteria for Skilled Therapeutic Interventions Met (OT) yes;skilled treatment is necessary  -KF     Therapy Frequency (OT) daily  -KF       Row Name 12/13/24 1140          Therapy Plan Review/Discharge Plan (OT)    Anticipated Discharge Disposition (OT) inpatient rehabilitation facility  -       Row Name 12/13/24 1140          Vital Signs    Pre Systolic BP Rehab 142  -KF     Pre Treatment Diastolic   -KF     Pretreatment Heart Rate (beats/min) 97  -KF     Pre SpO2 (%) 92  -KF     O2 Delivery Pre Treatment room air  -KF     Pre Patient Position Supine  -KF     Intra  Patient Position Standing  -KF     Post Patient Position Sitting  -KF       Row Name 12/13/24 1140          Positioning and Restraints    Pre-Treatment Position in bed  -KF     Post Treatment Position chair  -KF     In Chair notified nsg;reclined;call light within reach;encouraged to call for assist;exit alarm on;waffle cushion;legs elevated  -KF               User Key  (r) = Recorded By, (t) = Taken By, (c) = Cosigned By      Initials Name Provider Type    Coleen Suresh OT Occupational Therapist                   Outcome Measures       Row Name 12/13/24 1143          How much help from another is currently needed...    Putting on and taking off regular lower body clothing? 3  -KF     Bathing (including washing, rinsing, and drying) 3  -KF     Toileting (which includes using toilet bed pan or urinal) 3  -KF     Putting on and taking off regular upper body clothing 3  -KF     Taking care of personal grooming (such as brushing teeth) 4  -KF     Eating meals 4  -KF     AM-PAC 6 Clicks Score (OT) 20  -KF       Row Name 12/13/24 0845          How much help from another person do you currently need...    Turning from your back to your side while in flat bed without using bedrails? 3  -SB     Moving from lying on back to sitting on the side of a flat bed without bedrails? 3  -SB     Moving to and from a bed to a chair (including a wheelchair)? 3  -SB     Standing up from a chair using your arms (e.g., wheelchair, bedside chair)? 3  -SB     Climbing 3-5 steps with a railing? 2  -SB     To walk in hospital room? 3  -SB     AM-PAC 6 Clicks Score (PT) 17  -SB     Highest Level of Mobility Goal 5 --> Static standing  -SB       Row Name 12/13/24 1143          Functional Assessment    Outcome Measure Options AM-PAC 6 Clicks Daily Activity (OT)  -KF               User Key  (r) = Recorded By, (t) = Taken By, (c) = Cosigned By      Initials Name Provider Type    Harvinder Gaona, RN Registered Nurse    Coleen Suresh, OT  Occupational Therapist                    Occupational Therapy Education       Title: PT OT SLP Therapies (In Progress)       Topic: Occupational Therapy (In Progress)       Point: ADL training (In Progress)       Description:   Instruct learner(s) on proper safety adaptation and remediation techniques during self care or transfers.   Instruct in proper use of assistive devices.                  Learning Progress Summary            Patient Acceptance, E,TB, VU,DU by KF at 12/13/2024 0936    Acceptance, E, NR by LR at 12/11/2024 1352    Acceptance, E,TB, VU,DU by KF at 12/6/2024 0846   Family Acceptance, E, NR by LR at 12/11/2024 1352                      Point: Home exercise program (In Progress)       Description:   Instruct learner(s) on appropriate technique for monitoring, assisting and/or progressing therapeutic exercises/activities.                  Learning Progress Summary            Patient Acceptance, E, NR by LR at 12/11/2024 1352   Family Acceptance, E, NR by LR at 12/11/2024 1352                      Point: Precautions (In Progress)       Description:   Instruct learner(s) on prescribed precautions during self-care and functional transfers.                  Learning Progress Summary            Patient Acceptance, E,TB, VU,DU by KF at 12/13/2024 0936    Acceptance, E, NR by LR at 12/11/2024 1352    Acceptance, E,TB, VU,DU by KF at 12/6/2024 0846   Family Acceptance, E, NR by LR at 12/11/2024 1352                      Point: Body mechanics (In Progress)       Description:   Instruct learner(s) on proper positioning and spine alignment during self-care, functional mobility activities and/or exercises.                  Learning Progress Summary            Patient Acceptance, E,TB, VU,DU by KF at 12/13/2024 0936    Acceptance, E, NR by LR at 12/11/2024 1352    Acceptance, E,TB, VU,DU by KF at 12/6/2024 0846   Family Acceptance, E, NR by LR at 12/11/2024 1352                                      User Key        Initials Effective Dates Name Provider Type Discipline    KF 08/09/23 -  Coleen Tsai, OT Occupational Therapist OT    LR 10/09/24 -  Daniela Jo, [place] Occupational Therapist OT                  OT Recommendation and Plan  Planned Therapy Interventions (OT): activity tolerance training, adaptive equipment training, BADL retraining, functional balance retraining, occupation/activity based interventions, patient/caregiver education/training, ROM/therapeutic exercise, strengthening exercise, transfer/mobility retraining  Therapy Frequency (OT): daily  Plan of Care Review  Plan of Care Reviewed With: patient  Progress: improving  Outcome Evaluation: Pt with good participation and progress toward goals, requiring less assistance for mobility and ADL completion. The pt ambulated a short household distance using a RWx with CGA, nearing SBA. Pt doffed/donned socks with CGA and completed standing sink side grooming ADLs with SBA. The pt remains slightly below his functional baseline with generalized weakness, decreased activity tolerance, and minor balance deficits warranting continued IP OT services. Continue to rec a d/c to IRF for best outcome, but will monitor closely.     Time Calculation:   Evaluation Complexity (OT)  Review Occupational Profile/Medical/Therapy History Complexity: brief/low complexity  Assessment, Occupational Performance/Identification of Deficit Complexity: 1-3 performance deficits  Clinical Decision Making Complexity (OT): problem focused assessment/low complexity  Overall Complexity of Evaluation (OT): low complexity     Time Calculation- OT       Row Name 12/13/24 1144             Time Calculation- OT    OT Start Time 0936  -KF      OT Received On 12/13/24  -KF      OT Goal Re-Cert Due Date 12/16/24  -KF         Timed Charges    72985 - OT Therapeutic Activity Minutes 8  -KF      12509 - OT Self Care/Mgmt Minutes 16  -KF         Total Minutes    Timed Charges Total Minutes 24 -KF        Total Minutes 24  -KF                User Key  (r) = Recorded By, (t) = Taken By, (c) = Cosigned By      Initials Name Provider Type    Colene Suresh OT Occupational Therapist                  Therapy Charges for Today       Code Description Service Date Service Provider Modifiers Qty    [id number]  OT THERAPEUTIC ACT EA 15 MIN 12/13/2024 Coleen Tsai OT GO 1    [id number]  OT SELF CARE/MGMT/TRAIN EA 15 MIN 12/13/2024 Coleen Tsai OT GO 1                 Coleen Tsai OT  12/13/2024

## 2024-12-13 NOTE — PLAN OF CARE
Goal Outcome Evaluation:  Plan of Care Reviewed With: patient        Progress: improving  Outcome Evaluation: Pt with good participation and progress toward goals, requiring less assistance for mobility and ADL completion. The pt ambulated a short household distance using a RWx with CGA, nearing SBA. Pt doffed/donned socks with CGA and completed standing sink side grooming ADLs with SBA. The pt remains slightly below his functional baseline with generalized weakness, decreased activity tolerance, and minor balance deficits warranting continued IP OT services. Continue to rec a d/c to IRF for best outcome, but will monitor closely.    Anticipated Discharge Disposition (OT): inpatient rehabilitation facility

## 2024-12-13 NOTE — PLAN OF CARE
Goal Outcome Evaluation:         Patient is on RA, in A.Fib, Aox4, up in chair, BGLs well controlled today, no Bm, good UOP via rahman catheter, VSS - will continue POC.

## 2024-12-13 NOTE — PROGRESS NOTES
[facility] Medicine Services  PROGRESS NOTE    Patient Name: Darien Camarena  : 1935  MRN: [id number]    Date of Admission: 2024  Primary Care Physician: Pito Way MD    Subjective   Subjective     CC:  Follow-up for weakness    HPI:  Patient seen and examined this morning.  Comfortable in bed.  Patient with no acute complaints today.  No acute events overnight.  Pain in his foot resolved.  Awaiting SNF    Objective   Objective     Vital Signs:   Temp:  [97.2 °F (36.2 °C)-97.9 °F (36.6 °C)] 97.5 °F (36.4 °C)  Heart Rate:  [] 99  Resp:  [16] 16  BP: (127-153)/() 128/89     Physical Exam:  General: Comfortable, not in distress, conversant and cooperative  Head: Atraumatic and normocephalic  Eyes: No Icterus. No pallor  Ears:  Ears appear intact with no abnormalities noted  Throat: No oral lesions, no thrush  Neck: Supple, trachea midline  Lungs: Clear to auscultation bilaterally, equal air entry, no wheezing or crackles  Heart:  Normal S1 and S2, no murmur, no gallop, No JVD, no lower extremity swelling  Abdomen:  Soft, no tenderness, no organomegaly, normal bowel sounds, no organomegaly  Extremities: pulses equal bilaterally  Skin: No bleeding, bruising or rash, normal skin turgor and elasticity  Neurologic: Cranial nerves appear intact with no evidence of facial asymmetry, normal motor and sensory functions in all 4 extremities  Psych: Alert and oriented x 3, normal mood    Results Reviewed:  LAB RESULTS:      Lab 24  0410 24  0619 24  0439 24  0430   WBC 5.97 8.21 6.83 6.15   HEMOGLOBIN 11.8* 11.2* 11.3* 11.3*   HEMATOCRIT 37.0* 35.2* 36.6* 35.3*   PLATELETS 174 159 154 149   NEUTROS ABS 3.60  --  4.60 4.15   IMMATURE GRANS (ABS) 0.02  --  0.02 0.01   LYMPHS ABS 1.35  --  1.32 1.29   MONOS ABS 0.45  --  0.45 0.39   EOS ABS 0.48*  --  0.39 0.26   MCV 96.9 97.5* 99.2* 98.3*   SED RATE 85* 74*  --   --    CRP 2.65* 3.55*  --    --          Lab 12/12/24  0410 12/09/24  0619 12/08/24  0439 12/07/24  0430 12/06/24  0835   SODIUM 138 138 138 141 138   POTASSIUM 4.1 4.4 4.2 3.9 4.4   CHLORIDE 105 105 106 105 105   CO2 26.0 27.0 27.0 27.0 27.0   ANION GAP 7.0 6.0 5.0 9.0 6.0   BUN 16 16 21 21 31*   CREATININE 0.64* 0.60* 0.68* 0.66* 0.94   EGFR 91.1 92.8 89.4 90.2 78.0   GLUCOSE 97 97 124* 105* 119*   CALCIUM 8.6 8.9 8.5* 8.6 8.6   MAGNESIUM 1.9  --   --  1.7  --    PHOSPHORUS  --   --   --  3.1  --          Lab 12/12/24 0410 12/08/24  0439 12/07/24  0430   TOTAL PROTEIN 6.1 5.7* 5.9*   ALBUMIN 2.5* 2.7* 2.8*   GLOBULIN 3.6 3.0 3.1   ALT (SGPT) 31 15 12   AST (SGOT) 57* 33 25   BILIRUBIN 0.4 0.5 0.7   ALK PHOS 172* 167* 166*                       Brief Urine Lab Results  (Last result in the past 365 days)        Color   Clarity   Blood   Leuk Est   Nitrite   Protein   CREAT   Urine HCG        12/05/24 1432 Yellow   Clear   Negative   Moderate (2+)   Positive   Negative                   Microbiology Results Abnormal       Procedure Component Value - Date/Time    Urine Culture - Urine, Urine, Catheter [[id number]]  (Abnormal) Collected: 12/05/24 1432    Lab Status: Final result Specimen: Urine, Catheter Updated: 12/07/24 1028     Urine Culture >100,000 CFU/mL Escherichia coli    Narrative:      Refer to previous urine culture collected on 12/05/2024 1610 for MICs    Colonization of the urinary tract without infection is common. Treatment is discouraged unless the patient is symptomatic, pregnant, or undergoing an invasive urologic procedure.            No radiology results from the last 24 hrs    Results for orders placed during the hospital encounter of 11/21/24    Adult Transesophageal Echo 3D (FARHAD) W/ Cont If Necessary Per Protocol    Interpretation Summary    There is a 27mm Watchman FLX device in place. It appears well seated and well compressed with no device related thrombus seen. There is a small jet of color flow, 2.5 mm, at the superior  aspect adjacent to the left upper pulmonary vein. This was not seen previous examination however image quality is improved compared to prior.    Left ventricular systolic function is moderately decreased. Left ventricular ejection fraction appears to be 36 - 40%. Normal left ventricular wall thickness noted. The left ventricular cavity is dilated. There is left ventricular global hypokinesis noted.    : The right ventricular cavity is mildly dilated. Mildly reduced right ventricular systolic function noted.    : The left atrial cavity is severely dilated. No evidence of a left atrial thrombus present. There is light spontaneous echo contrast present in the atrial body.    The right atrial cavity is severely dilated.    There is mild, bileaflet mitral valve thickening present. Mild mitral valve regurgitation is present. No significant mitral valve stenosis is present.    Mild tricuspid valve regurgitation is present. Estimated right ventricular systolic pressure from tricuspid regurgitation is mildly elevated (35-45 mmHg).    There is moderate pulmonic valve regurgitation present.      Current medications:  Scheduled Meds:aspirin, 81 mg, Oral, Daily  colchicine, 0.6 mg, Oral, Daily  donepezil, 10 mg, Oral, Nightly  enoxaparin, 40 mg, Subcutaneous, Nightly  finasteride, 5 mg, Oral, Daily  fluticasone, 2 spray, Each Nare, BID  insulin lispro, 2-7 Units, Subcutaneous, 4x Daily AC & at Bedtime  memantine, 10 mg, Oral, BID  metoprolol tartrate, 50 mg, Oral, Q12H  pantoprazole, 40 mg, Oral, Q AM  pravastatin, 40 mg, Oral, Daily  sertraline, 50 mg, Oral, Daily  sodium chloride, 10 mL, Intravenous, Q12H      Continuous Infusions:     PRN Meds:.  acetaminophen    Albuterol Sulfate NEB Orderable    senna-docusate sodium **AND** polyethylene glycol **AND** bisacodyl **AND** bisacodyl    Calcium Replacement - Follow Nurse / BPA Driven Protocol    dextrose    dextrose    glucagon (human recombinant)    Magnesium Standard Dose  Replacement - Follow Nurse / BPA Driven Protocol    melatonin    ondansetron    Phosphorus Replacement - Follow Nurse / BPA Driven Protocol    Potassium Replacement - Follow Nurse / BPA Driven Protocol    sodium chloride    sodium chloride    sodium chloride    Assessment & Plan   Assessment & Plan     Active Hospital Problems    Diagnosis  POA    **Orthostatic hypotension [I95.1]  Yes    Acute on chronic urinary retention [R33.9]  Yes    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 male with history of chronic atrial fibrillation s/p watchman, CKD stage III, chronic urinary retention with self caths, HFmr EF, type 2 diabetes, hypertension hyperlipidemia.  Patient sent from urology clinic with complaints of dizziness and orthostatic hypotension    Acute UTI, POA  Chronic urine retention with severe left hydronephrosis, improved  Patient with known history of chronic retention.  He self cath 4 times a day.  Have been having difficulty self cathing likely secondary to developing urethral stricture  Seen at urology clinic and sent to the hospital because of hypotension  UA is concerning for UTI.  Urine culture with pansensitive E. coli.  Continue Rocephin and transition to p.o. cefdinir upon discharge  Urology following  Had a Del Cid catheter placed per urology recs.  Repeat CT abdomen after Del Cid catheter placement showed complete resolution of the severe left hydronephrosis.  Dr. Larkin /urology recommended to discharge the patient on Del Cid catheter and follow-up with him in the clinic in 2 weeks    Orthostatic hypotension, resolved  Likely secondary to poor oral intake over the last few days    Improved with IV fluids  Continue metoprolol     HFmrEF, compensated  Paroxysmal A-fib  Echo from 11/21 with EF 35-to 40%  Recently had a recent increase in Lasix due to lower extremity edema.  Lasix currently on hold because of hypotension  Continue metoprolol and amiodarone for rate control   Not on anticoagulation.  Status post Watchman device.  Continue aspirin alone  Appreciate help from cardiology team    Right foot pain secondary to gout flare, improved  CRP and sedimentation rate are elevated  X-ray with advanced arthritis but no other acute findings or fractures  Pain improved with colchicine .  Continue treatment for 2 weeks    CKD stage III  Creatinine stable at baseline.  Continue to monitor     Well-controlled type 2 diabetes   A1c 5.9%  Continue SSI      Dementia and depression  Continue Aricept and Namenda  Continue Zoloft     Chronic weakness  Case management consulted for short-term rehab      Expected Discharge Location and Transportation: [facility]  Expected Discharge   Expected Discharge Date: 12/13/2024; Expected Discharge Time:      VTE Prophylaxis:  Pharmacologic & mechanical VTE prophylaxis orders are present.         AM-PAC 6 Clicks Score (PT): 17 (12/12/24 2020)    CODE STATUS:   Code Status and Medical Interventions: CPR (Attempt to Resuscitate); Full Support   Ordered at: 12/10/24 1050     Level Of Support Discussed With:    Patient     Code Status (Patient has no pulse and is not breathing):    CPR (Attempt to Resuscitate)     Medical Interventions (Patient has pulse or is breathing):    Full Support       Calrence Samaniego MD  12/13/24

## 2024-12-13 NOTE — PLAN OF CARE
Goal Outcome Evaluation:  Plan of Care Reviewed With: patient        Progress: improving  Outcome Evaluation: Patient on room air. Atrial fibrillation on monitor. Blood pressure slightly elevated. Patient reported headache, treated with prn medication. Patient awake most of shift, reporting he is unable to sleep. No acute events this shift.

## 2024-12-13 NOTE — CASE MANAGEMENT/SOCIAL WORK
Continued Stay Note  [facility]     Patient Name: Darien Camarena  MRN: [id number]  Today's Date: 12/13/2024    Admit Date: 12/5/2024    Plan: Rehab   Discharge Plan       Row Name 12/13/24 6552       Plan    Plan Rehab    Patient/Family in Agreement with Plan yes    Plan Comments Discussed in MDR. Mr. Camarena is ready for discharge, PENDING placement. I called [facility] and left a message for Veronique Admissions Coordinator and called Kristy liaison with [facility] to follow up on Mr. Camarena's referral that was sent. Left a VM for both of them to return my call.  Awaiting call back. CM will continue to follow and will assist with any discharge needs as indicated.    Final Discharge Disposition Code 03 - skilled nursing facility (SNF)                   Discharge Codes    No documentation.                 Expected Discharge Date and Time       Expected Discharge Date Expected Discharge Time    Dec 17, 2024               Leandra Lion RN

## 2024-12-14 LAB
GLUCOSE BLDC GLUCOMTR-MCNC: 123 MG/DL (ref 70–130)
GLUCOSE BLDC GLUCOMTR-MCNC: 167 MG/DL (ref 70–130)
GLUCOSE BLDC GLUCOMTR-MCNC: 212 MG/DL (ref 70–130)
GLUCOSE BLDC GLUCOMTR-MCNC: 97 MG/DL (ref 70–130)

## 2024-12-14 PROCEDURE — 82948 REAGENT STRIP/BLOOD GLUCOSE: CPT

## 2024-12-14 PROCEDURE — 25010000002 ENOXAPARIN PER 10 MG

## 2024-12-14 PROCEDURE — 63710000001 INSULIN LISPRO (HUMAN) PER 5 UNITS: Performed by: INTERNAL MEDICINE

## 2024-12-14 PROCEDURE — 99232 SBSQ HOSP IP/OBS MODERATE 35: CPT

## 2024-12-14 RX ADMIN — METOPROLOL TARTRATE 50 MG: 50 TABLET, FILM COATED ORAL at 20:34

## 2024-12-14 RX ADMIN — PANTOPRAZOLE SODIUM 40 MG: 40 TABLET, DELAYED RELEASE ORAL at 05:44

## 2024-12-14 RX ADMIN — DONEPEZIL HYDROCHLORIDE 10 MG: 10 TABLET, FILM COATED ORAL at 20:34

## 2024-12-14 RX ADMIN — INSULIN LISPRO 3 UNITS: 100 INJECTION, SOLUTION INTRAVENOUS; SUBCUTANEOUS at 12:32

## 2024-12-14 RX ADMIN — MEMANTINE 10 MG: 10 TABLET ORAL at 20:34

## 2024-12-14 RX ADMIN — MEMANTINE 10 MG: 10 TABLET ORAL at 08:33

## 2024-12-14 RX ADMIN — Medication 10 ML: at 08:34

## 2024-12-14 RX ADMIN — FLUTICASONE PROPIONATE 2 SPRAY: 50 SPRAY, METERED NASAL at 20:35

## 2024-12-14 RX ADMIN — PRAVASTATIN SODIUM 40 MG: 40 TABLET ORAL at 08:33

## 2024-12-14 RX ADMIN — FLUTICASONE PROPIONATE 2 SPRAY: 50 SPRAY, METERED NASAL at 08:34

## 2024-12-14 RX ADMIN — SERTRALINE HYDROCHLORIDE 50 MG: 50 TABLET ORAL at 08:34

## 2024-12-14 RX ADMIN — ENOXAPARIN SODIUM 40 MG: 100 INJECTION SUBCUTANEOUS at 20:35

## 2024-12-14 RX ADMIN — Medication 10 ML: at 20:40

## 2024-12-14 RX ADMIN — FINASTERIDE 5 MG: 5 TABLET, FILM COATED ORAL at 08:33

## 2024-12-14 RX ADMIN — INSULIN LISPRO 2 UNITS: 100 INJECTION, SOLUTION INTRAVENOUS; SUBCUTANEOUS at 20:35

## 2024-12-14 RX ADMIN — ASPIRIN 81 MG: 81 TABLET, COATED ORAL at 08:33

## 2024-12-14 RX ADMIN — METOPROLOL TARTRATE 50 MG: 50 TABLET, FILM COATED ORAL at 08:34

## 2024-12-14 RX ADMIN — COLCHICINE 0.6 MG: 0.6 TABLET ORAL at 08:34

## 2024-12-14 NOTE — PLAN OF CARE
Goal Outcome Evaluation:         Patient is on RA, A.Fib controlled on monitor, Aox4, he had 1 Bm during the shift, ambulates well within the room, no acute changes, still waiting for insurance approval for rehab, VSS - will continue POC.

## 2024-12-14 NOTE — PROGRESS NOTES
[facility] Medicine Services  PROGRESS NOTE    Patient Name: Darien Camarena  : 1935  MRN: [id number]    Date of Admission: 2024  Primary Care Physician: Pito Way MD    Subjective   Subjective     CC:  Follow-up for weakness    HPI:  Patient states he is surprised he hasn't gone crazy from being in the hospital. No acute complaints. No family at bedside.     Objective   Objective     Vital Signs:   Temp:  [97.6 °F (36.4 °C)-98 °F (36.7 °C)] 97.9 °F (36.6 °C)  Heart Rate:  [] 109  Resp:  [16-18] 16  BP: (121-145)/() 139/108     Physical Exam:  Constitutional: Elderly appearing male sitting up in bed in NAD  HENT: NCAT, mucous membranes moist  Respiratory: Clear to auscultation bilaterally, nonlabored respirations on room air  Cardiovascular: IRIR, no murmurs, rubs, or gallops, no bilateral ankle edema, palpable pedal pulses bilaterally  Gastrointestinal: Positive bowel sounds, soft, nontender, nondistended  Musculoskeletal: PETERS spontaneously  Psychiatric: Appropriate affect, cooperative  Neurologic: Oriented x 3, speech clear  Skin: Chronic stasis dermatitis    Results Reviewed:  LAB RESULTS:      Lab 24   WBC 5.97 8.21 6.83   HEMOGLOBIN 11.8* 11.2* 11.3*   HEMATOCRIT 37.0* 35.2* 36.6*   PLATELETS 174 159 154   NEUTROS ABS 3.60  --  4.60   IMMATURE GRANS (ABS) 0.02  --  0.02   LYMPHS ABS 1.35  --  1.32   MONOS ABS 0.45  --  0.45   EOS ABS 0.48*  --  0.39   MCV 96.9 97.5* 99.2*   SED RATE 85* 74*  --    CRP 2.65* 3.55*  --          Lab 24   SODIUM 138 138 138   POTASSIUM 4.1 4.4 4.2   CHLORIDE 105 105 106   CO2 26.0 27.0 27.0   ANION GAP 7.0 6.0 5.0   BUN 16 16 21   CREATININE 0.64* 0.60* 0.68*   EGFR 91.1 92.8 89.4   GLUCOSE 97 97 124*   CALCIUM 8.6 8.9 8.5*   MAGNESIUM 1.9  --   --          Lab 24  0410 24  0439   TOTAL PROTEIN 6.1 5.7*   ALBUMIN 2.5*  2.7*   GLOBULIN 3.6 3.0   ALT (SGPT) 31 15   AST (SGOT) 57* 33   BILIRUBIN 0.4 0.5   ALK PHOS 172* 167*                       Brief Urine Lab Results  (Last result in the past 365 days)        Color   Clarity   Blood   Leuk Est   Nitrite   Protein   CREAT   Urine HCG        12/05/24 1432 Yellow   Clear   Negative   Moderate (2+)   Positive   Negative                   Microbiology Results Abnormal       Procedure Component Value - Date/Time    Urine Culture - Urine, Urine, Catheter [[id number]]  (Abnormal) Collected: 12/05/24 1432    Lab Status: Final result Specimen: Urine, Catheter Updated: 12/07/24 1028     Urine Culture >100,000 CFU/mL Escherichia coli    Narrative:      Refer to previous urine culture collected on 12/05/2024 1610 for MICs    Colonization of the urinary tract without infection is common. Treatment is discouraged unless the patient is symptomatic, pregnant, or undergoing an invasive urologic procedure.            No radiology results from the last 24 hrs    Results for orders placed during the hospital encounter of 11/21/24    Adult Transesophageal Echo 3D (FARHAD) W/ Cont If Necessary Per Protocol    Interpretation Summary    There is a 27mm Watchman FLX device in place. It appears well seated and well compressed with no device related thrombus seen. There is a small jet of color flow, 2.5 mm, at the superior aspect adjacent to the left upper pulmonary vein. This was not seen previous examination however image quality is improved compared to prior.    Left ventricular systolic function is moderately decreased. Left ventricular ejection fraction appears to be 36 - 40%. Normal left ventricular wall thickness noted. The left ventricular cavity is dilated. There is left ventricular global hypokinesis noted.    : The right ventricular cavity is mildly dilated. Mildly reduced right ventricular systolic function noted.    : The left atrial cavity is severely dilated. No evidence of a left atrial thrombus  present. There is light spontaneous echo contrast present in the atrial body.    The right atrial cavity is severely dilated.    There is mild, bileaflet mitral valve thickening present. Mild mitral valve regurgitation is present. No significant mitral valve stenosis is present.    Mild tricuspid valve regurgitation is present. Estimated right ventricular systolic pressure from tricuspid regurgitation is mildly elevated (35-45 mmHg).    There is moderate pulmonic valve regurgitation present.      Current medications:  Scheduled Meds:aspirin, 81 mg, Oral, Daily  colchicine, 0.6 mg, Oral, Daily  donepezil, 10 mg, Oral, Nightly  enoxaparin, 40 mg, Subcutaneous, Nightly  finasteride, 5 mg, Oral, Daily  fluticasone, 2 spray, Each Nare, BID  insulin lispro, 2-7 Units, Subcutaneous, 4x Daily AC & at Bedtime  memantine, 10 mg, Oral, BID  metoprolol tartrate, 50 mg, Oral, Q12H  pantoprazole, 40 mg, Oral, Q AM  pravastatin, 40 mg, Oral, Daily  sertraline, 50 mg, Oral, Daily  sodium chloride, 10 mL, Intravenous, Q12H      Continuous Infusions:     PRN Meds:.  acetaminophen    Albuterol Sulfate NEB Orderable    senna-docusate sodium **AND** polyethylene glycol **AND** bisacodyl **AND** bisacodyl    Calcium Replacement - Follow Nurse / BPA Driven Protocol    dextrose    dextrose    glucagon (human recombinant)    Magnesium Standard Dose Replacement - Follow Nurse / BPA Driven Protocol    melatonin    ondansetron    Phosphorus Replacement - Follow Nurse / BPA Driven Protocol    Potassium Replacement - Follow Nurse / BPA Driven Protocol    sodium chloride    sodium chloride    sodium chloride    Assessment & Plan   Assessment & Plan     Active Hospital Problems    Diagnosis  POA    **Orthostatic hypotension [I95.1]  Yes    Acute on chronic urinary retention [R33.9]  Yes    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 male with history of chronic atrial fibrillation s/p watchman, CKD stage III, chronic urinary retention with self caths, HFmr EF, type 2 diabetes, hypertension hyperlipidemia.  Patient sent from urology clinic with complaints of dizziness and orthostatic hypotension.    This patient's problems and plans were partially entered by my partner and updated as appropriate by me 12/14/24.     Acute UTI, POA  Chronic urine retention with severe left hydronephrosis, improved  - Patient with known history of chronic retention. He self cath 4 times a day. Have been having difficulty self cathing likely secondary to developing urethral stricture  - Seen at urology clinic and sent to the hospital because of hypotension  - UA is concerning for UTI. Urine culture with pansensitive E. coli. Continue Rocephin and transition to p.o. cefdinir upon discharge  - Urology following  - Had a Del Cid catheter placed per urology recs. Repeat CT abdomen after Del Cid catheter placement showed complete resolution of the severe left hydronephrosis. Dr. Larkin (Urology) recommended to discharge the patient on Del Cid catheter and follow-up with him in the clinic in 2 weeks    Orthostatic hypotension, resolved  - Likely secondary to poor oral intake over the last few days - oral intake improving  - Improved with IV fluids  - Continue metoprolol     HFmrEF, compensated  Paroxysmal A-fib  - Echo from 11/21 with EF 35-to 40%  - Recently had a recent increase in Lasix due to lower extremity edema.  Lasix currently on hold because of hypotension  - Continue metoprolol and amiodarone for rate control   - Not on anticoagulation. Status post Watchman device. Continue aspirin alone  - Appreciate help from cardiology team    Right foot pain  secondary to gout flare, improved  - CRP and sedimentation rate are elevated  - X-ray with advanced arthritis but no other acute findings or fractures  - Pain improved with colchicine. Continue treatment for 2 weeks, through 12/23/24    CKD stage III  - Creatinine stable at baseline     Well-controlled type 2 diabetes   - A1c 5.9%  - Continue SSI      Dementia and depression  - Continue Aricept and Namenda  - Continue Zoloft     Chronic weakness  - Case management consulted for short-term rehab    Expected Discharge Location and Transportation: SNF  Expected Discharge   Expected Discharge Date: 12/17/2024; Expected Discharge Time:      VTE Prophylaxis:  Pharmacologic & mechanical VTE prophylaxis orders are present.         AM-PAC 6 Clicks Score (PT): 20 (12/13/24 2010)    CODE STATUS:   Code Status and Medical Interventions: CPR (Attempt to Resuscitate); Full Support   Ordered at: 12/10/24 1050     Level Of Support Discussed With:    Patient     Code Status (Patient has no pulse and is not breathing):    CPR (Attempt to Resuscitate)     Medical Interventions (Patient has pulse or is breathing):    Full Support       Shirlene Mcmahan PA-C  12/14/24

## 2024-12-14 NOTE — PLAN OF CARE
Goal Outcome Evaluation:  Plan of Care Reviewed With: patient        Progress: improving  Outcome Evaluation: Patient on room air. Atrial fibrillation on monitor. Vital signs stable. Patient was able to sleep some during the night. No report of pain. No acute events this shift.

## 2024-12-15 LAB
GLUCOSE BLDC GLUCOMTR-MCNC: 129 MG/DL (ref 70–130)
GLUCOSE BLDC GLUCOMTR-MCNC: 158 MG/DL (ref 70–130)
GLUCOSE BLDC GLUCOMTR-MCNC: 175 MG/DL (ref 70–130)
GLUCOSE BLDC GLUCOMTR-MCNC: 90 MG/DL (ref 70–130)

## 2024-12-15 PROCEDURE — 25010000002 ENOXAPARIN PER 10 MG

## 2024-12-15 PROCEDURE — 63710000001 INSULIN LISPRO (HUMAN) PER 5 UNITS: Performed by: INTERNAL MEDICINE

## 2024-12-15 PROCEDURE — 99232 SBSQ HOSP IP/OBS MODERATE 35: CPT | Performed by: NURSE PRACTITIONER

## 2024-12-15 PROCEDURE — 82948 REAGENT STRIP/BLOOD GLUCOSE: CPT

## 2024-12-15 RX ADMIN — FLUTICASONE PROPIONATE 2 SPRAY: 50 SPRAY, METERED NASAL at 21:58

## 2024-12-15 RX ADMIN — MEMANTINE 10 MG: 10 TABLET ORAL at 21:23

## 2024-12-15 RX ADMIN — FINASTERIDE 5 MG: 5 TABLET, FILM COATED ORAL at 09:13

## 2024-12-15 RX ADMIN — PANTOPRAZOLE SODIUM 40 MG: 40 TABLET, DELAYED RELEASE ORAL at 05:51

## 2024-12-15 RX ADMIN — METOPROLOL TARTRATE 50 MG: 50 TABLET, FILM COATED ORAL at 21:23

## 2024-12-15 RX ADMIN — SERTRALINE HYDROCHLORIDE 50 MG: 50 TABLET ORAL at 09:13

## 2024-12-15 RX ADMIN — METOPROLOL TARTRATE 50 MG: 50 TABLET, FILM COATED ORAL at 09:13

## 2024-12-15 RX ADMIN — FLUTICASONE PROPIONATE 2 SPRAY: 50 SPRAY, METERED NASAL at 09:12

## 2024-12-15 RX ADMIN — Medication 10 ML: at 09:15

## 2024-12-15 RX ADMIN — ASPIRIN 81 MG: 81 TABLET, COATED ORAL at 09:12

## 2024-12-15 RX ADMIN — DONEPEZIL HYDROCHLORIDE 10 MG: 10 TABLET, FILM COATED ORAL at 21:23

## 2024-12-15 RX ADMIN — COLCHICINE 0.6 MG: 0.6 TABLET ORAL at 09:13

## 2024-12-15 RX ADMIN — INSULIN LISPRO 2 UNITS: 100 INJECTION, SOLUTION INTRAVENOUS; SUBCUTANEOUS at 17:46

## 2024-12-15 RX ADMIN — INSULIN LISPRO 2 UNITS: 100 INJECTION, SOLUTION INTRAVENOUS; SUBCUTANEOUS at 21:26

## 2024-12-15 RX ADMIN — Medication 10 ML: at 21:27

## 2024-12-15 RX ADMIN — MEMANTINE 10 MG: 10 TABLET ORAL at 09:13

## 2024-12-15 RX ADMIN — PRAVASTATIN SODIUM 40 MG: 40 TABLET ORAL at 09:13

## 2024-12-15 RX ADMIN — ENOXAPARIN SODIUM 40 MG: 100 INJECTION SUBCUTANEOUS at 21:22

## 2024-12-15 NOTE — PROGRESS NOTES
[facility] Medicine Services  PROGRESS NOTE    Patient Name: Darien Camarena  : 1935  MRN: [id number]    Date of Admission: 2024  Primary Care Physician: Pito Way MD    Subjective   Subjective     CC:  Follow-up for weakness    HPI:  Resting in bed watching Ipad. No pain. No new needs. Very eager to get discharged to rehab.     Objective   Objective     Vital Signs:   Temp:  [97.5 °F (36.4 °C)-98.2 °F (36.8 °C)] 97.9 °F (36.6 °C)  Heart Rate:  [] 99  Resp:  [14-22] 14  BP: (112-146)/() 117/84     Physical Exam:  Constitutional: Elderly appearing male resting in bed, NAD  HENT: NCAT, mucous membranes moist  Respiratory: Clear to auscultation bilaterally, nonlabored respirations on room air  Cardiovascular: IRIR, no murmurs, rubs, or gallops, no bilateral ankle edema, palpable pedal pulses bilaterally  Gastrointestinal: Positive bowel sounds, soft, nontender, nondistended  : rahman in place   Musculoskeletal: PETERS spontaneously  Psychiatric: Appropriate affect, cooperative  Neurologic: Oriented x 3, speech clear  Skin: Chronic stasis dermatitis to BLE    Results Reviewed:  LAB RESULTS:      Lab 24  0410 24   WBC 5.97 8.21   HEMOGLOBIN 11.8* 11.2*   HEMATOCRIT 37.0* 35.2*   PLATELETS 174 159   NEUTROS ABS 3.60  --    IMMATURE GRANS (ABS) 0.02  --    LYMPHS ABS 1.35  --    MONOS ABS 0.45  --    EOS ABS 0.48*  --    MCV 96.9 97.5*   SED RATE 85* 74*   CRP 2.65* 3.55*         Lab 24  0410 24  0619   SODIUM 138 138   POTASSIUM 4.1 4.4   CHLORIDE 105 105   CO2 26.0 27.0   ANION GAP 7.0 6.0   BUN 16 16   CREATININE 0.64* 0.60*   EGFR 91.1 92.8   GLUCOSE 97 97   CALCIUM 8.6 8.9   MAGNESIUM 1.9  --          Lab 24  0410   TOTAL PROTEIN 6.1   ALBUMIN 2.5*   GLOBULIN 3.6   ALT (SGPT) 31   AST (SGOT) 57*   BILIRUBIN 0.4   ALK PHOS 172*                       Brief Urine Lab Results  (Last result in the past 365 days)         Color   Clarity   Blood   Leuk Est   Nitrite   Protein   CREAT   Urine HCG        12/05/24 1432 Yellow   Clear   Negative   Moderate (2+)   Positive   Negative                   Microbiology Results Abnormal       Procedure Component Value - Date/Time    Urine Culture - Urine, Urine, Catheter [[id number]]  (Abnormal) Collected: 12/05/24 1432    Lab Status: Final result Specimen: Urine, Catheter Updated: 12/07/24 1028     Urine Culture >100,000 CFU/mL Escherichia coli    Narrative:      Refer to previous urine culture collected on 12/05/2024 1610 for MICs    Colonization of the urinary tract without infection is common. Treatment is discouraged unless the patient is symptomatic, pregnant, or undergoing an invasive urologic procedure.            No radiology results from the last 24 hrs    Results for orders placed during the hospital encounter of 11/21/24    Adult Transesophageal Echo 3D (FARHAD) W/ Cont If Necessary Per Protocol    Interpretation Summary    There is a 27mm Watchman FLX device in place. It appears well seated and well compressed with no device related thrombus seen. There is a small jet of color flow, 2.5 mm, at the superior aspect adjacent to the left upper pulmonary vein. This was not seen previous examination however image quality is improved compared to prior.    Left ventricular systolic function is moderately decreased. Left ventricular ejection fraction appears to be 36 - 40%. Normal left ventricular wall thickness noted. The left ventricular cavity is dilated. There is left ventricular global hypokinesis noted.    : The right ventricular cavity is mildly dilated. Mildly reduced right ventricular systolic function noted.    : The left atrial cavity is severely dilated. No evidence of a left atrial thrombus present. There is light spontaneous echo contrast present in the atrial body.    The right atrial cavity is severely dilated.    There is mild, bileaflet mitral valve thickening present. Mild  mitral valve regurgitation is present. No significant mitral valve stenosis is present.    Mild tricuspid valve regurgitation is present. Estimated right ventricular systolic pressure from tricuspid regurgitation is mildly elevated (35-45 mmHg).    There is moderate pulmonic valve regurgitation present.      Current medications:  Scheduled Meds:aspirin, 81 mg, Oral, Daily  colchicine, 0.6 mg, Oral, Daily  donepezil, 10 mg, Oral, Nightly  enoxaparin, 40 mg, Subcutaneous, Nightly  finasteride, 5 mg, Oral, Daily  fluticasone, 2 spray, Each Nare, BID  insulin lispro, 2-7 Units, Subcutaneous, 4x Daily AC & at Bedtime  memantine, 10 mg, Oral, BID  metoprolol tartrate, 50 mg, Oral, Q12H  pantoprazole, 40 mg, Oral, Q AM  pravastatin, 40 mg, Oral, Daily  sertraline, 50 mg, Oral, Daily  sodium chloride, 10 mL, Intravenous, Q12H      Continuous Infusions:     PRN Meds:.  acetaminophen    Albuterol Sulfate NEB Orderable    senna-docusate sodium **AND** polyethylene glycol **AND** bisacodyl **AND** bisacodyl    Calcium Replacement - Follow Nurse / BPA Driven Protocol    dextrose    dextrose    glucagon (human recombinant)    Magnesium Standard Dose Replacement - Follow Nurse / BPA Driven Protocol    melatonin    ondansetron    Phosphorus Replacement - Follow Nurse / BPA Driven Protocol    Potassium Replacement - Follow Nurse / BPA Driven Protocol    sodium chloride    sodium chloride    sodium chloride    Assessment & Plan   Assessment & Plan     Active Hospital Problems    Diagnosis  POA    **Orthostatic hypotension [I95.1]  Yes    Acute on chronic urinary retention [R33.9]  Yes    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 male with history of chronic atrial fibrillation s/p watchman, CKD stage III, chronic urinary retention with self caths, HFmr EF, type 2 diabetes, hypertension hyperlipidemia.  Patient sent from urology clinic with complaints of dizziness and orthostatic hypotension.    This patient's problems and plans were partially entered by my partner and updated as appropriate by me 12/15/24.     Acute UTI, POA  Chronic urine retention with severe left hydronephrosis, improved  - Patient with known history of chronic retention. He self cath 4 times a day. Have been having difficulty self cathing likely secondary to developing urethral stricture  - Seen at urology clinic and sent to the hospital because of hypotension  - UA is concerning for UTI. Urine culture with pansensitive E. coli. Continue Rocephin and transition to p.o. cefdinir upon discharge  - Urology following  - Had a Del Cid catheter placed per urology recs. Repeat CT abdomen after Del Cid catheter placement showed complete resolution of the severe left hydronephrosis. Dr. Larkin (Urology) recommended to discharge the patient on Del Cid catheter and follow-up with him in the clinic in 2 weeks    Orthostatic hypotension, resolved  - Likely secondary to poor oral intake over the last few days - oral intake improving  - Improved with IV fluids  - Continue metoprolol     HFmrEF, compensated  Paroxysmal A-fib  - Echo from 11/21 with EF 35-to 40%  - Recently had a recent increase in Lasix due to lower extremity edema.  Lasix currently on hold because of hypotension  - Continue metoprolol and amiodarone for rate control   - Not on anticoagulation. Status post Watchman device. Continue aspirin alone  - Appreciate help from cardiology team    Right foot pain secondary to gout flare, improved  - CRP and sedimentation rate are elevated  - X-ray with advanced arthritis but no other acute findings or fractures  - Pain improved with colchicine. Continue  treatment for 2 weeks, through 12/23/24    CKD stage III  - Creatinine stable at baseline     Well-controlled type 2 diabetes   - A1c 5.9%  - Continue SSI      Dementia and depression  - Continue Aricept and Namenda  - Continue Zoloft     Chronic weakness  - Case management consulted for short-term rehab    Expected Discharge Location and Transportation: SNF  Expected Discharge   Expected Discharge Date: 12/17/2024; Expected Discharge Time:      VTE Prophylaxis:  Pharmacologic & mechanical VTE prophylaxis orders are present.         AM-PAC 6 Clicks Score (PT): 20 (12/14/24 2030)    CODE STATUS:   Code Status and Medical Interventions: CPR (Attempt to Resuscitate); Full Support   Ordered at: 12/10/24 1050     Level Of Support Discussed With:    Patient     Code Status (Patient has no pulse and is not breathing):    CPR (Attempt to Resuscitate)     Medical Interventions (Patient has pulse or is breathing):    Full Support       Greer Portillo, APRN  12/15/24

## 2024-12-15 NOTE — PLAN OF CARE
Goal Outcome Evaluation:  Plan of Care Reviewed With: patient        Progress: improving  Outcome Evaluation: Pt did well throughout the day. No c/o of pain or discomfort. Using tablet to watch videos. On rm air, VSS. Hoping to hear from Cm about placement soon.

## 2024-12-15 NOTE — PLAN OF CARE
Goal Outcome Evaluation:              Outcome Evaluation: Pt remains On RA, A-fib on tele, VSS. No complaints of pain this shift. Pt slept through the night. Will continue with POC.

## 2024-12-16 ENCOUNTER — TELEPHONE (OUTPATIENT)
Dept: INTERNAL MEDICINE | Facility: CLINIC | Age: 89
End: 2024-12-16

## 2024-12-16 LAB
GLUCOSE BLDC GLUCOMTR-MCNC: 108 MG/DL (ref 70–130)
GLUCOSE BLDC GLUCOMTR-MCNC: 178 MG/DL (ref 70–130)
GLUCOSE BLDC GLUCOMTR-MCNC: 87 MG/DL (ref 70–130)
GLUCOSE BLDC GLUCOMTR-MCNC: 88 MG/DL (ref 70–130)

## 2024-12-16 PROCEDURE — 82948 REAGENT STRIP/BLOOD GLUCOSE: CPT

## 2024-12-16 PROCEDURE — 97110 THERAPEUTIC EXERCISES: CPT

## 2024-12-16 PROCEDURE — 97530 THERAPEUTIC ACTIVITIES: CPT

## 2024-12-16 PROCEDURE — 25010000002 ENOXAPARIN PER 10 MG

## 2024-12-16 PROCEDURE — 99232 SBSQ HOSP IP/OBS MODERATE 35: CPT | Performed by: NURSE PRACTITIONER

## 2024-12-16 PROCEDURE — 63710000001 INSULIN LISPRO (HUMAN) PER 5 UNITS: Performed by: INTERNAL MEDICINE

## 2024-12-16 RX ADMIN — MEMANTINE 10 MG: 10 TABLET ORAL at 09:21

## 2024-12-16 RX ADMIN — COLCHICINE 0.6 MG: 0.6 TABLET ORAL at 09:21

## 2024-12-16 RX ADMIN — FLUTICASONE PROPIONATE 2 SPRAY: 50 SPRAY, METERED NASAL at 20:25

## 2024-12-16 RX ADMIN — PRAVASTATIN SODIUM 40 MG: 40 TABLET ORAL at 09:21

## 2024-12-16 RX ADMIN — FINASTERIDE 5 MG: 5 TABLET, FILM COATED ORAL at 09:21

## 2024-12-16 RX ADMIN — DONEPEZIL HYDROCHLORIDE 10 MG: 10 TABLET, FILM COATED ORAL at 20:23

## 2024-12-16 RX ADMIN — ENOXAPARIN SODIUM 40 MG: 100 INJECTION SUBCUTANEOUS at 20:25

## 2024-12-16 RX ADMIN — INSULIN LISPRO 2 UNITS: 100 INJECTION, SOLUTION INTRAVENOUS; SUBCUTANEOUS at 20:23

## 2024-12-16 RX ADMIN — METOPROLOL TARTRATE 50 MG: 50 TABLET, FILM COATED ORAL at 20:23

## 2024-12-16 RX ADMIN — METOPROLOL TARTRATE 50 MG: 50 TABLET, FILM COATED ORAL at 09:20

## 2024-12-16 RX ADMIN — Medication 10 ML: at 09:24

## 2024-12-16 RX ADMIN — Medication 10 ML: at 20:25

## 2024-12-16 RX ADMIN — SERTRALINE HYDROCHLORIDE 50 MG: 50 TABLET ORAL at 09:21

## 2024-12-16 RX ADMIN — ASPIRIN 81 MG: 81 TABLET, COATED ORAL at 09:20

## 2024-12-16 RX ADMIN — MEMANTINE 10 MG: 10 TABLET ORAL at 20:23

## 2024-12-16 RX ADMIN — PANTOPRAZOLE SODIUM 40 MG: 40 TABLET, DELAYED RELEASE ORAL at 06:13

## 2024-12-16 NOTE — PROGRESS NOTES
[facility] Medicine Services  PROGRESS NOTE    Patient Name: Darien Camarena  : 1935  MRN: [id number]    Date of Admission: 2024  Primary Care Physician: Pito Way MD    Subjective   Subjective     CC:  Follow-up for weakness    HPI:  Up in chair, NAD. No pain. Very eager to go home.     Objective   Objective     Vital Signs:   Temp:  [97.5 °F (36.4 °C)-98.4 °F (36.9 °C)] 98.4 °F (36.9 °C)  Heart Rate:  [] 94  Resp:  [16-18] 18  BP: (120-159)/() 130/94     Physical Exam:  Constitutional: Elderly appearing male resting in bed, NAD  HENT: NCAT, mucous membranes moist  Respiratory: Clear to auscultation bilaterally, nonlabored respirations on room air  Cardiovascular: IRIR, no murmurs, rubs, or gallops, no bilateral ankle edema, palpable pedal pulses bilaterally  Gastrointestinal: Positive bowel sounds, soft, nontender, nondistended  : rahman in place   Musculoskeletal: PETERS spontaneously  Psychiatric: Appropriate affect, cooperative  Neurologic: Oriented x 3, speech clear  Skin: Chronic stasis dermatitis to BLE    No changes from 12/15     Results Reviewed:  LAB RESULTS:      Lab 24  0410   WBC 5.97   HEMOGLOBIN 11.8*   HEMATOCRIT 37.0*   PLATELETS 174   NEUTROS ABS 3.60   IMMATURE GRANS (ABS) 0.02   LYMPHS ABS 1.35   MONOS ABS 0.45   EOS ABS 0.48*   MCV 96.9   SED RATE 85*   CRP 2.65*         Lab 24  0410   SODIUM 138   POTASSIUM 4.1   CHLORIDE 105   CO2 26.0   ANION GAP 7.0   BUN 16   CREATININE 0.64*   EGFR 91.1   GLUCOSE 97   CALCIUM 8.6   MAGNESIUM 1.9         Lab 24  0410   TOTAL PROTEIN 6.1   ALBUMIN 2.5*   GLOBULIN 3.6   ALT (SGPT) 31   AST (SGOT) 57*   BILIRUBIN 0.4   ALK PHOS 172*                       Brief Urine Lab Results  (Last result in the past 365 days)        Color   Clarity   Blood   Leuk Est   Nitrite   Protein   CREAT   Urine HCG        24 1432 Yellow   Clear   Negative   Moderate (2+)   Positive    Negative                   Microbiology Results Abnormal       Procedure Component Value - Date/Time    Urine Culture - Urine, Urine, Catheter [[id number]]  (Abnormal) Collected: 12/05/24 1432    Lab Status: Final result Specimen: Urine, Catheter Updated: 12/07/24 1028     Urine Culture >100,000 CFU/mL Escherichia coli    Narrative:      Refer to previous urine culture collected on 12/05/2024 1610 for MICs    Colonization of the urinary tract without infection is common. Treatment is discouraged unless the patient is symptomatic, pregnant, or undergoing an invasive urologic procedure.            No radiology results from the last 24 hrs    Results for orders placed during the hospital encounter of 11/21/24    Adult Transesophageal Echo 3D (FARHAD) W/ Cont If Necessary Per Protocol    Interpretation Summary    There is a 27mm Watchman FLX device in place. It appears well seated and well compressed with no device related thrombus seen. There is a small jet of color flow, 2.5 mm, at the superior aspect adjacent to the left upper pulmonary vein. This was not seen previous examination however image quality is improved compared to prior.    Left ventricular systolic function is moderately decreased. Left ventricular ejection fraction appears to be 36 - 40%. Normal left ventricular wall thickness noted. The left ventricular cavity is dilated. There is left ventricular global hypokinesis noted.    : The right ventricular cavity is mildly dilated. Mildly reduced right ventricular systolic function noted.    : The left atrial cavity is severely dilated. No evidence of a left atrial thrombus present. There is light spontaneous echo contrast present in the atrial body.    The right atrial cavity is severely dilated.    There is mild, bileaflet mitral valve thickening present. Mild mitral valve regurgitation is present. No significant mitral valve stenosis is present.    Mild tricuspid valve regurgitation is present. Estimated right  ventricular systolic pressure from tricuspid regurgitation is mildly elevated (35-45 mmHg).    There is moderate pulmonic valve regurgitation present.      Current medications:  Scheduled Meds:aspirin, 81 mg, Oral, Daily  colchicine, 0.6 mg, Oral, Daily  donepezil, 10 mg, Oral, Nightly  enoxaparin, 40 mg, Subcutaneous, Nightly  finasteride, 5 mg, Oral, Daily  fluticasone, 2 spray, Each Nare, BID  insulin lispro, 2-7 Units, Subcutaneous, 4x Daily AC & at Bedtime  memantine, 10 mg, Oral, BID  metoprolol tartrate, 50 mg, Oral, Q12H  pantoprazole, 40 mg, Oral, Q AM  pravastatin, 40 mg, Oral, Daily  sertraline, 50 mg, Oral, Daily  sodium chloride, 10 mL, Intravenous, Q12H      Continuous Infusions:     PRN Meds:.  acetaminophen    Albuterol Sulfate NEB Orderable    senna-docusate sodium **AND** polyethylene glycol **AND** bisacodyl **AND** bisacodyl    Calcium Replacement - Follow Nurse / BPA Driven Protocol    dextrose    dextrose    glucagon (human recombinant)    Magnesium Standard Dose Replacement - Follow Nurse / BPA Driven Protocol    melatonin    ondansetron    Phosphorus Replacement - Follow Nurse / BPA Driven Protocol    Potassium Replacement - Follow Nurse / BPA Driven Protocol    sodium chloride    sodium chloride    sodium chloride    Assessment & Plan   Assessment & Plan     Active Hospital Problems    Diagnosis  POA    **Orthostatic hypotension [I95.1]  Yes    Acute on chronic urinary retention [R33.9]  Yes    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 male with history of chronic atrial fibrillation s/p watchman, CKD stage III, chronic urinary retention with self caths, HFmr EF,  type 2 diabetes, hypertension hyperlipidemia.  Patient sent from urology clinic with complaints of dizziness and orthostatic hypotension.    This patient's problems and plans were partially entered by my partner and updated as appropriate by me 12/16/24.     Acute UTI, POA  Chronic urine retention with severe left hydronephrosis, improved  - Patient with known history of chronic retention. He self cath 4 times a day. Have been having difficulty self cathing likely secondary to developing urethral stricture  - Seen at urology clinic and sent to the hospital because of hypotension  - UA is concerning for UTI. Urine culture with pansensitive E. coli. Continue Rocephin and transition to p.o. cefdinir upon discharge  - Urology following  - Had a Del Cid catheter placed per urology recs. Repeat CT abdomen after Del Cid catheter placement showed complete resolution of the severe left hydronephrosis. Dr. Larkin (Urology) recommended to discharge the patient on Del Cid catheter and follow-up with him in the clinic in 2 weeks    Orthostatic hypotension, resolved  - Likely secondary to poor oral intake over the last few days - oral intake improving  - Improved with IV fluids  - Continue metoprolol     HFmrEF, compensated  Paroxysmal A-fib  - Echo from 11/21 with EF 35-to 40%  - Recently had a recent increase in Lasix due to lower extremity edema.  Lasix currently on hold because of hypotension  - Continue metoprolol and amiodarone for rate control   - Not on anticoagulation. Status post Watchman device. Continue aspirin alone  - Appreciate help from cardiology team    Right foot pain secondary to gout flare, improved  - CRP and sedimentation rate are elevated  - X-ray with advanced arthritis but no other acute findings or fractures  - Pain improved with colchicine. Continue treatment for 2 weeks, through 12/23/24    CKD stage III  - Creatinine stable at baseline     Well-controlled type 2 diabetes   - A1c 5.9%  - Continue SSI       Dementia and depression  - Continue Aricept and Namenda  - Continue Zoloft     Chronic weakness  - Case management consulted for short-term rehab    Expected Discharge Location and Transportation: SNF  Expected Discharge   Expected Discharge Date: 12/17/2024; Expected Discharge Time:      VTE Prophylaxis:  Pharmacologic & mechanical VTE prophylaxis orders are present.         AM-PAC 6 Clicks Score (PT): 18 (12/16/24 0818)    CODE STATUS:   Code Status and Medical Interventions: CPR (Attempt to Resuscitate); Full Support   Ordered at: 12/10/24 1050     Level Of Support Discussed With:    Patient     Code Status (Patient has no pulse and is not breathing):    CPR (Attempt to Resuscitate)     Medical Interventions (Patient has pulse or is breathing):    Full Support       Greer Portillo, BERYL  12/16/24

## 2024-12-16 NOTE — TELEPHONE ENCOUNTER
Caller: Nikki Ware    Relationship: Emergency Contact    Best call back number:     What is the best time to reach you: ANYTIME    Who are you requesting to speak with (clinical staff, provider,  specific staff member): CLINICAL STAFF    Do you know the name of the person who called: NIKKI    What was the call regarding: PATIENT IS IN [facility] SINCE [id number]

## 2024-12-16 NOTE — CASE MANAGEMENT/SOCIAL WORK
Continued Stay Note  [facility]     Patient Name: Darien Camarena  MRN: [id number]  Today's Date: 12/16/2024    Admit Date: 12/5/2024    Plan: Rehab vs HH   Discharge Plan       Row Name 12/16/24 1316       Plan    Plan Rehab vs HH    Plan Comments Pt is in AFib and on room air. He walked 5ft with contact guard and a walker today. He has a rahman. Per [facility] available. Per [facility] does not have beds available. I left a message for Dodie with [facility] to return my call regarding the status of the referral. Kimberly with [facility] is to return my call after morning to discuss bed availability. Update was provided to daughter, Nikki. Nikki states if a bed is not offered today, she plans to take him home with HH tomorrow; provider and RN informed. I spoke with Pt, in room, who is hoping to go home tomorrow. CM will continue to follow for medical readiness.    Addendum: 12/16 @ 1545 - Pt has accepted a bed offer at [facility]. Provider and RN informed. CM has requested OT see Pt in AM to provide updated notes. Once available, will submit for insurance approval.     Final Discharge Disposition Code 06 - home with home health care                   Discharge Codes    No documentation.                 Expected Discharge Date and Time       Expected Discharge Date Expected Discharge Time    Dec 17, 2024               Pat Lake RN

## 2024-12-16 NOTE — THERAPY PROGRESS REPORT/RE-CERT
Patient Name: Darien Camarena  : 1935    MRN: [id number]                              Today's Date: 2024       Admit Date: 2024    Visit Dx:     ICD-10-CM ICD-9-CM   1. Orthostasis  I95.1 458.0   2. Light headedness  R42 780.4   3. Chronic atrial fibrillation with rapid ventricular response  I48.20 427.31   4. Acute on chronic urinary retention  R33.9 788.29     Patient Active Problem List   Diagnosis    Type 2 diabetes mellitus with stage 3 chronic kidney disease, without long-term current use of insulin    Gastroesophageal reflux disease with esophagitis    Hyperlipidemia LDL goal <100    Essential hypertension    Nephrolithiasis    Obstructive sleep apnea syndrome    Permanent atrial fibrillation    Stage 3 chronic kidney disease    Coronary artery disease involving native coronary artery of native heart without angina pectoris    Malignant neoplasm of lower lobe of left lung    H/O: GI bleed    Presence of Watchman left atrial appendage closure device    Orthostatic hypotension    Heart failure with mildly reduced ejection fraction (HFmrEF)    Obesity    Choledocholithiasis    Severe malnutrition    Esophagitis    Encounter for removal of biliary stent    Acute on chronic urinary retention     Past Medical History:   Diagnosis Date    Arrhythmia     Atrial fibrillation     Chronic kidney disease     COPD (chronic obstructive pulmonary disease)     Coronary artery disease     Dementia     Diabetes mellitus     doesnt check sugar    E. coli sepsis 2022    Enlarged prostate without lower urinary tract symptoms (luts) 2016    Full dentures     GERD (gastroesophageal reflux disease)     Gout     Hearing aid worn     bilat prn    History of colonoscopy 2012    History of radiation therapy 2023    SBRT LLL lung    [place] (hard of hearing)     hearing aids prn    Hyperlipidemia     Hypertension     Kidney stone     surgery x1    Lung cancer     STEVEN on CPAP     compliant with  machine    PAF (paroxysmal atrial fibrillation)     Prostatism     Urinary incontinence     Urinary tract infection     Wears glasses     readers     Past Surgical History:   Procedure Laterality Date    ATRIAL APPENDAGE EXCLUSION LEFT WITH TRANSESOPHAGEAL ECHOCARDIOGRAM N/A 11/28/2023    Procedure: Atrial Appendage Occlusion;  Surgeon: Anthony Mcdaniel MD;  Location: [facility] EP INVASIVE LOCATION;  Service: Cardiovascular;  Laterality: N/A;    CARDIAC CATHETERIZATION Left 09/29/2022    Procedure: Left Heart Cath;  Surgeon: Titus Oliveros IV, MD;  Location:  [facility] CATH INVASIVE LOCATION;  Service: Cardiovascular;  Laterality: Left;    CARDIOVERSION      CATARACT EXTRACTION Bilateral     COLONOSCOPY      CYSTOSCOPY      ENDOSCOPY      possible    ENDOSCOPY N/A 9/5/2024    Procedure: ESOPHAGOGASTRODUODENOSCOPY;  Surgeon: Anthony Arambula MD;  Location:  [facility];  Service: Gastroenterology;  Laterality: N/A;  Esophagus dilated to 18mm with 12mm-mm to 15mm, then 15mm to 18mm balloon.    ENDOSCOPY N/A 10/31/2024    Procedure: ESOPHAGOGASTRODUODENOSCOPY WITH BIOPSY;  Surgeon: Carlos Dior MD;  Location:  [facility];  Service: Gastroenterology;  Laterality: N/A;    ERCP N/A 9/5/2024    Procedure: ENDOSCOPIC RETROGRADE CHOLANGIOPANCREATOGRAPHY;  Surgeon: Anthony Arambula MD;  Location:  [facility];  Service: Gastroenterology;  Laterality: N/A;  Sphincterotomy made to ampula of common bile duct (CBD), CBD swept with 9mm-12mm balloon - sludge, stones and purulent appearing drainage extracted. 10x7 [place] biliary stent depolyed into CBD. ERCP scope removed with balloon intact.    ERCP N/A 10/31/2024    Procedure: ENDOSCOPIC RETROGRADE CHOLANGIOPANCREATOGRAPHY;  Surgeon: Carlos Dior MD;  Location:  [facility];  Service: Gastroenterology;  Laterality: N/A;    KIDNEY STONE SURGERY      x1      General Information       Row Name 12/16/24 0818          Physical Therapy Time  and Intention    Document Type progress note/recertification  -NS     Mode of Treatment individual therapy;physical therapy  -NS       Row Name 12/16/24 0818          General Information    Patient Profile Reviewed yes  -NS     Existing Precautions/Restrictions fall;other (see comments)  monitor BP  -NS       Row Name 12/16/24 0818          Cognition    Orientation Status (Cognition) oriented x 4  -NS       Row Name 12/16/24 0818          Safety Issues/Impairments Affecting Functional Mobility    Safety Issues Affecting Function (Mobility) safety precautions follow-through/compliance;sequencing abilities  -NS     Impairments Affecting Function (Mobility) balance;endurance/activity tolerance;strength  -NS               User Key  (r) = Recorded By, (t) = Taken By, (c) = Cosigned By      Initials Name Provider Type    NS Arlin Mosquera PT Physical Therapist                   Mobility       Row Name 12/16/24 0818          Bed Mobility    Bed Mobility supine-sit  -NS     Supine-Sit [place] (Bed Mobility) standby assist  -NS     Assistive Device (Bed Mobility) bed rails;head of bed elevated  -NS       Row Name 12/16/24 0818          Transfers    Comment, (Transfers) Cue for hand placement  -NS       Row Name 12/16/24 0818          Sit-Stand Transfer    Sit-Stand [place] (Transfers) contact guard;verbal cues  -NS     Assistive Device (Sit-Stand Transfers) walker, front-wheeled  -NS       Row Name 12/16/24 0818          Gait/Stairs (Locomotion)    [place] Level (Gait) contact guard  -NS     Assistive Device (Gait) walker, front-wheeled  -NS     Distance in Feet (Gait) 5  -NS     Deviations/Abnormal Patterns (Gait) gait speed decreased;stride length decreased;marilyn decreased  -NS     Bilateral Gait Deviations forward flexed posture;heel strike decreased  -NS     Comment, (Gait/Stairs) Patient ambulated from bed to chair, demonstrating flexed posture. Cues for slow descent into chair. Deferred further  ambulation due to hypertension.  -NS               User Key  (r) = Recorded By, (t) = Taken By, (c) = Cosigned By      Initials Name Provider Type    Arlin Mirza PT Physical Therapist                   Obj/Interventions       Row Name 12/16/24 0818          Range of Motion Comprehensive    General Range of Motion bilateral lower extremity ROM WFL  -NS       Row Name 12/16/24 0818          Strength Comprehensive (MMT)    General Manual Muscle Testing (MMT) Assessment lower extremity strength deficits identified  -NS     Comment, General Manual Muscle Testing (MMT) Assessment BLEs: grossly 4+/5  -NS       [facility] Name 12/16/24 0818          Motor Skills    Therapeutic Exercise hip;knee;ankle  -NS       Row Name 12/16/24 0818          Hip (Therapeutic Exercise)    Hip Strengthening (Therapeutic Exercise) bilateral;aBduction;aDduction;external rotation;internal rotation;marching while seated;15 repititions  -NS       Row Name 12/16/24 0818          Knee (Therapeutic Exercise)    Knee (Therapeutic Exercise) strengthening exercise  -NS     Knee Strengthening (Therapeutic Exercise) bilateral;LAQ (long arc quad);heel slides;SLR (straight leg raise);15 repititions  -NS       Row Name 12/16/24 0818          Ankle (Therapeutic Exercise)    Ankle (Therapeutic Exercise) AROM (active range of motion)  -NS     Ankle AROM (Therapeutic Exercise) bilateral;plantarflexion;dorsiflexion;15 repititions  -NS       Row Name 12/16/24 0818          Balance    Balance Assessment sitting static balance;sitting dynamic balance;standing static balance;standing dynamic balance  -NS     Static Sitting Balance standby assist  -NS     Dynamic Sitting Balance standby assist  -NS     Position, Sitting Balance unsupported;sitting edge of bed  -NS     Static Standing Balance contact guard  -NS     Dynamic Standing Balance contact guard  -NS     Position/Device Used, Standing Balance supported;walker, front-wheeled  -NS               User Key  (r) =  Recorded By, (t) = Taken By, (c) = Cosigned By      Initials Name Provider Type    Arlin Mirza PT Physical Therapist                   Goals/Plan       Row Name 12/16/24 0818          Bed Mobility Goal 1 (PT)    Activity/Assistive Device (Bed Mobility Goal 1, PT) supine to sit  -NS     [place] Level/Cues Needed (Bed Mobility Goal 1, PT) independent  -NS     Time Frame (Bed Mobility Goal 1, PT) short term goal (STG);5 days  -NS     Progress/Outcomes (Bed Mobility Goal 1, PT) goal revised this date  -NS       Row Name 12/16/24 0818          Transfer Goal 1 (PT)    Activity/Assistive Device (Transfer Goal 1, PT) sit-to-stand/stand-to-sit;bed-to-chair/chair-to-bed;toilet;walker, rolling  -NS     [place] Level/Cues Needed (Transfer Goal 1, PT) modified independence  -NS     Time Frame (Transfer Goal 1, PT) long term goal (LTG);10 days  -NS     Progress/Outcome (Transfer Goal 1, PT) continuing progress toward goal  -NS       Row Name 12/16/24 0818          Gait Training Goal 1 (PT)    [place] Level (Gait Training Goal 1, PT) modified independence  -NS     Distance (Gait Training Goal 1, PT) 150  -NS     Time Frame (Gait Training Goal 1, PT) long term goal (LTG);10 days  -NS     Progress/Outcome (Gait Training Goal 1, PT) continuing progress toward goal  -NS               User Key  (r) = Recorded By, (t) = Taken By, (c) = Cosigned By      Initials Name Provider Type    Arlin Mirza PT Physical Therapist                   Clinical Impression       Row Name 12/16/24 0818          Pain    Pretreatment Pain Rating 0/10 - no pain  -NS     Posttreatment Pain Rating 0/10 - no pain  -NS       Row Name 12/16/24 0818          Plan of Care Review    Plan of Care Reviewed With patient  -NS     Progress no change  -NS     Outcome Evaluation Patient continues to be limited by deficits in strength, balance, and endurance. Activity also limited today by pt's hypertension. Will continue to progress as tolerated.  Recommend IRF at d/c.  -NS       [facility] Name 12/16/24 0818          Vital Signs    Pre Systolic BP Rehab 131  -NS     Pre Treatment Diastolic BP 99  -NS     Intra Systolic BP Rehab 133  -NS     Intra Treatment Diastolic    RN notified  -NS     Pretreatment Heart Rate (beats/min) 107  -NS     Posttreatment Heart Rate (beats/min) 116  -NS     Pre SpO2 (%) 91  -NS     O2 Delivery Pre Treatment room air  -NS     Post SpO2 (%) 93  -NS     O2 Delivery Post Treatment room air  -NS     Pre Patient Position Supine  -NS     Intra Patient Position Standing  -NS     Post Patient Position Sitting  -NS       Row Name 12/16/24 0818          Positioning and Restraints    Pre-Treatment Position in bed  -NS     Post Treatment Position chair  -NS     In Chair notified nsg;reclined;call light within reach;encouraged to call for assist;exit alarm on;with nsg;waffle cushion;legs elevated  -NS               User Key  (r) = Recorded By, (t) = Taken By, (c) = Cosigned By      Initials Name Provider Type    Arlin Mirza, PT Physical Therapist                   Outcome Measures       [facility] Name 12/16/24 0818          How much help from another person do you currently need...    Turning from your back to your side while in flat bed without using bedrails? 4  -NS     Moving from lying on back to sitting on the side of a flat bed without bedrails? 3  -NS     Moving to and from a bed to a chair (including a wheelchair)? 3  -NS     Standing up from a chair using your arms (e.g., wheelchair, bedside chair)? 3  -NS     Climbing 3-5 steps with a railing? 2  -NS     To walk in hospital room? 3  -NS     AM-PAC 6 Clicks Score (PT) 18  -NS     Highest Level of Mobility Goal 6 --> Walk 10 steps or more  -NS       [facility] Name 12/16/24 0818          Functional Assessment    Outcome Measure Options AM-PAC 6 Clicks Basic Mobility (PT)  -NS               User Key  (r) = Recorded By, (t) = Taken By, (c) = Cosigned By      Initials Name Provider Type    NS  Arlin Mosquera, PT Physical Therapist                                 Physical Therapy Education       Title: PT OT SLP Therapies (In Progress)       Topic: Physical Therapy (Done)       Point: Mobility training (Done)       Learning Progress Summary            Patient Acceptance, E, VU,NR by NS at 12/16/2024 0908    Comment: safety with transfers, monitoring vitals with activity    Acceptance, E, VU,NR by KG at 12/12/2024 1137    Acceptance, E, VU by NS at 12/9/2024 1616    Comment: PT POC, discharge options    Acceptance, E, VU by ER at 12/6/2024 0934    Comment: making sure he gets enough fluids in/absorption to help orthostatics, transfer safety, d/c planning                      Point: Home exercise program (Done)       Learning Progress Summary            Patient Acceptance, E, VU,NR by NS at 12/16/2024 0908    Comment: safety with transfers, monitoring vitals with activity    Acceptance, E, VU,NR by KG at 12/12/2024 1137    Acceptance, E, VU by NS at 12/9/2024 1616    Comment: PT POC, discharge options    Acceptance, E, VU by ER at 12/6/2024 0934    Comment: making sure he gets enough fluids in/absorption to help orthostatics, transfer safety, d/c planning                      Point: Body mechanics (Done)       Learning Progress Summary            Patient Acceptance, E, VU,NR by NS at 12/16/2024 0908    Comment: safety with transfers, monitoring vitals with activity    Acceptance, E, VU,NR by KG at 12/12/2024 1137    Acceptance, E, VU by NS at 12/9/2024 1616    Comment: PT POC, discharge options    Acceptance, E, VU by ER at 12/6/2024 0934    Comment: making sure he gets enough fluids in/absorption to help orthostatics, transfer safety, d/c planning                      Point: Precautions (Done)       Learning Progress Summary            Patient Acceptance, E, VU,NR by NS at 12/16/2024 0908    Comment: safety with transfers, monitoring vitals with activity    Acceptance, E, VU,NR by KG at 12/12/2024 1137     Acceptance, E, VU by NS at 12/9/2024 1616    Comment: PT POC, discharge options    Acceptance, E, VU by ER at 12/6/2024 0934    Comment: making sure he gets enough fluids in/absorption to help orthostatics, transfer safety, d/c planning                                      User Key       Initials Effective Dates Name Provider Type Discipline    KG 05/22/20 -  Eugenie Mckoy, PT Physical Therapist PT    NS 06/16/21 -  Arlin Mosquera, PT Physical Therapist PT    ER 11/04/24 - 12/12/24 Cynthia Allison, PT Physical Therapist PT                  PT Recommendation and Plan     Progress: no change  Outcome Evaluation: Patient continues to be limited by deficits in strength, balance, and endurance. Activity also limited today by pt's hypertension. Will continue to progress as tolerated. Recommend IRF at d/c.     Time Calculation:         PT Charges       Row Name 12/16/24 0818             Time Calculation    Start Time 0818  -NS      PT Received On 12/16/24  -NS      PT Goal Re-Cert Due Date 12/26/24  -NS         Timed Charges    06102 - PT Therapeutic Exercise Minutes 17  -NS      22778 - PT Therapeutic Activity Minutes 13  -NS         Total Minutes    Timed Charges Total Minutes 30  -NS       Total Minutes 30  -NS                User Key  (r) = Recorded By, (t) = Taken By, (c) = Cosigned By      Initials Name Provider Type    Arlin Mirza, PT Physical Therapist                  Therapy Charges for Today       Code Description Service Date Service Provider Modifiers Qty    38852449257 HC PT THERAPEUTIC ACT EA 15 MIN 12/16/2024 Arlin Mosquera, PT GP 1    88482991319 HC PT THER PROC EA 15 MIN 12/16/2024 Arlin Mosquera, PT GP 1            PT G-Codes  Outcome Measure Options: AM-PAC 6 Clicks Basic Mobility (PT)  AM-PAC 6 Clicks Score (PT): 18  AM-PAC 6 Clicks Score (OT): 20  PT Discharge Summary  Anticipated Discharge Disposition (PT): inpatient rehabilitation facility    Arlin Mosquera PT  12/16/2024

## 2024-12-16 NOTE — PLAN OF CARE
Goal Outcome Evaluation:  Plan of Care Reviewed With: patient        Progress: no change  Outcome Evaluation: Patient continues to be limited by deficits in strength, balance, and endurance. Activity also limited today by pt's hypertension. Will continue to progress as tolerated. Recommend IRF at d/c.    Anticipated Discharge Disposition (PT): inpatient rehabilitation facility

## 2024-12-16 NOTE — PAYOR COMM NOTE
"Darien Camarena \"[place]\" (89 y.o. Male)     [id number]     Lindsey Stout RN  Utilization Review  [phone]  [phone]        Date of Birth   1935    Social Security Number       Address   [address] [facility] [address]    Home Phone   [phone]    MRN   [id number]       Anglican   Yarsanism    Marital Status                               Admission Date   12/5/24    Admission Type   Emergency    Admitting Provider   Clarence Samaniego MD    Attending Provider   Clarence Samaniego MD    Department, Room/Bed   [facility] 6B, N631/1       Discharge Date       Discharge Disposition       Discharge Destination                                 Attending Provider: Clarence Samaniego MD    Allergies: Sglt2 Inhibitors, Xarelto [Rivaroxaban], Penicillins    Isolation: Contact   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), 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)   Code Status: CPR    Ht: 190.5 cm (75\")   Wt: 90.7 kg (200 lb)    Admission Cmt: None   Principal Problem: Orthostatic hypotension [I95.1]                   Active Insurance as of 12/5/2024       Primary Coverage       Payor Plan Insurance Group Employer/Plan Group    ANTHEM MEDICARE REPLACEMENT ANTHEM MED ADV HMO [id number]       Payor Plan Address Payor Plan Phone Number Payor Plan Fax Number Effective Dates    [address] [phone]  1/1/2024 - None Entered    [facility] [address]         Subscriber Name Subscriber Birth Date Member ID       DARIEN CAMARENA 1935 [id number]                     Emergency Contacts        (Rel.) Home Phone Work Phone Mobile Phone    Nikki Ware (Daughter) [phone] -- [phone]    JackelynColumba (Daughter) [phone] -- [phone]    Migue Dolly Bray (Daughter) [phone] -- [phone]              Vital Signs (last day)       Date/Time Temp Temp src Pulse Resp BP Patient Position SpO2    12/16/24 1106 97.5 (36.4) Oral 90 18 144/102 Sitting 90    12/16/24 0920 " -- -- 114 -- 120/91 -- --    12/16/24 0731 97.9 (36.6) Oral 90 18 152/99 Lying 92    12/16/24 0426 97.5 (36.4) Oral 98 18 159/104 Lying 90    12/16/24 0035 98.1 (36.7) Oral 99 18 140/99 Lying 93    12/15/24 2123 -- -- 113 -- 147/102 -- --    12/15/24 1941 98 (36.7) Oral -- 16 135/89 Lying 94    12/15/24 1453 97.9 (36.6) Oral -- 14 117/84 Sitting 95    12/15/24 1128 98 (36.7) Oral 99 18 136/98 Lying 93    12/15/24 0912 -- -- 114 -- 112/90 -- --    12/15/24 0722 97.5 (36.4) Oral 106 18 146/103 Lying 93    12/15/24 0427 -- -- -- -- 144/105 -- --    12/15/24 0415 97.5 (36.4) Oral 113 22 138/119 Lying 94          Oxygen Therapy (last day)       Date/Time SpO2 Device (Oxygen Therapy) Flow (L/min) (Oxygen Therapy) Oxygen Concentration (%) ETCO2 (mmHg)    12/16/24 1400 -- room air -- -- --    12/16/24 1245 -- room air -- -- --    12/16/24 1106 90 -- -- -- --    12/16/24 0920 -- room air -- -- --    12/16/24 0731 92 -- -- -- --    12/16/24 0430 -- room air -- -- --    12/16/24 0426 90 room air -- -- --    12/16/24 0035 93 -- -- -- --    12/16/24 0030 -- room air -- -- --    12/15/24 2200 -- room air -- -- --    12/15/24 2000 -- room air -- -- --    12/15/24 1941 94 room air -- -- --    12/15/24 1830 -- room air -- -- --    12/15/24 1645 -- room air -- -- --    12/15/24 1453 95 room air -- -- --    12/15/24 1240 -- room air -- -- --    12/15/24 1128 93 -- -- -- --    12/15/24 0912 -- room air -- -- --    12/15/24 0722 93 -- -- -- --    12/15/24 0600 -- room air -- -- --    12/15/24 0415 94 -- -- -- --    12/15/24 0400 -- room air -- -- --    12/15/24 0200 -- room air -- -- --    12/15/24 0000 -- room air -- -- --          Lines, Drains & Airways       Active LDAs       Name Placement date Placement time Site Days    Peripheral IV 12/14/24 0553 Anterior;Distal;Right Forearm 12/14/24  0553  Forearm  2    Urethral Catheter Non-latex;Silicone 16 Fr. 12/05/24  6890  -- 10                  Current Facility-Administered Medications    Medication Dose Route Frequency Provider Last Rate Last Admin    acetaminophen (TYLENOL) tablet 650 mg  650 mg Oral Q6H PRN Ana Scott PA-C   650 mg at 12/13/24 0051    albuterol (PROVENTIL) nebulizer solution 0.083% 2.5 mg/3mL  2.5 mg Nebulization Q4H PRN Cindy Wade, [facility]        aspirin EC tablet 81 mg  81 mg Oral Daily Harley Jasso PA-C   81 mg at 12/16/24 0920    sennosides-docusate (PERICOLACE) 8.6-50 MG per tablet 2 tablet  2 tablet Oral BID PRN Junaid Acosta MD   2 tablet at 12/11/24 0945    And    polyethylene glycol (MIRALAX) packet 17 g  17 g Oral Daily PRN Junaid Acosta MD        And    bisacodyl (DULCOLAX) EC tablet 5 mg  5 mg Oral Daily PRN Junaid Acosta MD   5 mg at 12/09/24 1115    And    bisacodyl (DULCOLAX) suppository 10 mg  10 mg Rectal Daily PRN Junaid Acosta MD        Calcium Replacement - Follow Nurse / BPA Driven Protocol   Not Applicable PRN Junaid Acosta MD        colchicine tablet 0.6 mg  0.6 mg Oral Daily Clarence Samaniego MD   0.6 mg at 12/16/24 0921    dextrose (D50W) (25 g/50 mL) IV injection 25 g  25 g Intravenous Q15 Min PRN Junaid Acosta MD        dextrose (GLUTOSE) oral gel 15 g  15 g Oral Q15 Min PRN Junaid Acosta MD        donepezil (ARICEPT) tablet 10 mg  10 mg Oral Nightly Junaid Acosta MD   10 mg at 12/15/24 2123    Enoxaparin Sodium (LOVENOX) syringe 40 mg  40 mg Subcutaneous Nightly Shirlene Mcmahan PA-C   40 mg at 12/15/24 2122    finasteride (PROSCAR) tablet 5 mg  5 mg Oral Daily Junaid Acosta MD   5 mg at 12/16/24 0921    fluticasone (FLONASE) 50 MCG/ACT nasal spray 2 spray  2 spray Each Nare BID Clarence Samaniego MD   2 spray at 12/15/24 2158    glucagon (GLUCAGEN) injection 1 mg  1 mg Intramuscular Q15 Min PRN Junaid Acosta MD        Insulin Lispro (humaLOG) injection 2-7 Units  2-7 Units Subcutaneous 4x Daily AC & at Bedtime Junaid Acosta MD   2 Units at 12/15/24 2126    Magnesium Standard Dose Replacement - Follow  Nurse / BPA Driven Protocol   Not Applicable PRN Junaid Acosta MD        melatonin tablet 5 mg  5 mg Oral Nightly PRN Gina Abraham APRN   5 mg at 12/09/24 2136    memantine (NAMENDA) tablet 10 mg  10 mg Oral BID Junaid Acosta MD   10 mg at 12/16/24 0921    metoprolol tartrate (LOPRESSOR) tablet 50 mg  50 mg Oral Q12H Harley Jasso PA-C   50 mg at 12/16/24 0920    ondansetron (ZOFRAN) injection 4 mg  4 mg Intravenous Q6H PRN Junaid Acosta MD        pantoprazole (PROTONIX) EC tablet 40 mg  40 mg Oral Q AM Junaid Acosta MD   40 mg at 12/16/24 0613    Phosphorus Replacement - Follow Nurse / BPA Driven Protocol   Not Applicable PRN Junaid Acosta MD        Potassium Replacement - Follow Nurse / BPA Driven Protocol   Not Applicable PRN Junaid Acosta MD        pravastatin (PRAVACHOL) tablet 40 mg  40 mg Oral Daily Junaid Acosta MD   40 mg at 12/16/24 0921    sertraline (ZOLOFT) tablet 50 mg  50 mg Oral Daily Junaid Acosta MD   50 mg at 12/16/24 0921    sodium chloride 0.9 % flush 10 mL  10 mL Intravenous PRN Alexx Araujo MD        sodium chloride 0.9 % flush 10 mL  10 mL Intravenous Q12H Junaid Acosta MD   10 mL at 12/16/24 0924    sodium chloride 0.9 % flush 10 mL  10 mL Intravenous PRN Junaid Acosta MD        sodium chloride 0.9 % infusion 40 mL  40 mL Intravenous PRN Junaid Acosta MD         Lab Results (last 48 hours)       Procedure Component Value Units Date/Time    POC Glucose Once [[id number]]  (Normal) Collected: 12/16/24 1106    Specimen: Blood Updated: 12/16/24 1107     Glucose 87 mg/dL     POC Glucose Once [[id number]]  (Normal) Collected: 12/16/24 0733    Specimen: Blood Updated: 12/16/24 0735     Glucose 88 mg/dL     POC Glucose Once [[id number]]  (Abnormal) Collected: 12/15/24 2011    Specimen: Blood Updated: 12/15/24 2013     Glucose 158 mg/dL     POC Glucose Once [[id number]]  (Abnormal) Collected: 12/15/24 1636    Specimen: Blood Updated: 12/15/24 1636     Glucose 175 mg/dL      POC Glucose Once [[id number]]  (Normal) Collected: 12/15/24 1130    Specimen: Blood Updated: 12/15/24 1131     Glucose 129 mg/dL     POC Glucose Once [[id number]]  (Normal) Collected: 12/15/24 0721    Specimen: Blood Updated: 12/15/24 07     Glucose 90 mg/dL     POC Glucose Once [[id number]]  (Abnormal) Collected: 24    Specimen: Blood Updated: 24     Glucose 167 mg/dL     POC Glucose Once [[id number]]  (Normal) Collected: 24 160    Specimen: Blood Updated: 24 1608     Glucose 123 mg/dL           Imaging Results (Last 24 Hours)       ** No results found for the last 24 hours. **          ECG/EMG Results (last 24 hours)       Procedure Component Value Units Date/Time    Telemetry Scan [[id number]] Resulted: 24     Updated: 24 0135             Physician Progress Notes (last 24 hours)        Greer Portillo APRN at 12/15/24 1530              [facility] Medicine Services  PROGRESS NOTE    Patient Name: Darien Camarena  : 1935  MRN: [id number]    Date of Admission: 2024  Primary Care Physician: Pito Way MD    Subjective   Subjective     CC:  Follow-up for weakness    HPI:  Resting in bed watching Ipad. No pain. No new needs. Very eager to get discharged to rehab.     Objective   Objective     Vital Signs:   Temp:  [97.5 °F (36.4 °C)-98.2 °F (36.8 °C)] 97.9 °F (36.6 °C)  Heart Rate:  [] 99  Resp:  [14-22] 14  BP: (112-146)/() 117/84     Physical Exam:  Constitutional: Elderly appearing male resting in bed, NAD  HENT: NCAT, mucous membranes moist  Respiratory: Clear to auscultation bilaterally, nonlabored respirations on room air  Cardiovascular: IRIR, no murmurs, rubs, or gallops, no bilateral ankle edema, palpable pedal pulses bilaterally  Gastrointestinal: Positive bowel sounds, soft, nontender, nondistended  : rahman in place   Musculoskeletal: PETERS spontaneously  Psychiatric: Appropriate affect,  cooperative  Neurologic: Oriented x 3, speech clear  Skin: Chronic stasis dermatitis to BLE    Results Reviewed:  LAB RESULTS:      Lab 12/12/24  0410 12/09/24  0619   WBC 5.97 8.21   HEMOGLOBIN 11.8* 11.2*   HEMATOCRIT 37.0* 35.2*   PLATELETS 174 159   NEUTROS ABS 3.60  --    IMMATURE GRANS (ABS) 0.02  --    LYMPHS ABS 1.35  --    MONOS ABS 0.45  --    EOS ABS 0.48*  --    MCV 96.9 97.5*   SED RATE 85* 74*   CRP 2.65* 3.55*         Lab 12/12/24  0410 12/09/24  0619   SODIUM 138 138   POTASSIUM 4.1 4.4   CHLORIDE 105 105   CO2 26.0 27.0   ANION GAP 7.0 6.0   BUN 16 16   CREATININE 0.64* 0.60*   EGFR 91.1 92.8   GLUCOSE 97 97   CALCIUM 8.6 8.9   MAGNESIUM 1.9  --          Lab 12/12/24  0410   TOTAL PROTEIN 6.1   ALBUMIN 2.5*   GLOBULIN 3.6   ALT (SGPT) 31   AST (SGOT) 57*   BILIRUBIN 0.4   ALK PHOS 172*                       Brief Urine Lab Results  (Last result in the past 365 days)        Color   Clarity   Blood   Leuk Est   Nitrite   Protein   CREAT   Urine HCG        12/05/24 1432 Yellow   Clear   Negative   Moderate (2+)   Positive   Negative                   Microbiology Results Abnormal       Procedure Component Value - Date/Time    Urine Culture - Urine, Urine, Catheter [[id number]]  (Abnormal) Collected: 12/05/24 1432    Lab Status: Final result Specimen: Urine, Catheter Updated: 12/07/24 1028     Urine Culture >100,000 CFU/mL Escherichia coli    Narrative:      Refer to previous urine culture collected on 12/05/2024 1610 for MICs    Colonization of the urinary tract without infection is common. Treatment is discouraged unless the patient is symptomatic, pregnant, or undergoing an invasive urologic procedure.            No radiology results from the last 24 hrs    Results for orders placed during the hospital encounter of 11/21/24    Adult Transesophageal Echo 3D (FARHAD) W/ Cont If Necessary Per Protocol    Interpretation Summary    There is a 27mm Watchman FLX device in place. It appears well seated and well  compressed with no device related thrombus seen. There is a small jet of color flow, 2.5 mm, at the superior aspect adjacent to the left upper pulmonary vein. This was not seen previous examination however image quality is improved compared to prior.    Left ventricular systolic function is moderately decreased. Left ventricular ejection fraction appears to be 36 - 40%. Normal left ventricular wall thickness noted. The left ventricular cavity is dilated. There is left ventricular global hypokinesis noted.    : The right ventricular cavity is mildly dilated. Mildly reduced right ventricular systolic function noted.    : The left atrial cavity is severely dilated. No evidence of a left atrial thrombus present. There is light spontaneous echo contrast present in the atrial body.    The right atrial cavity is severely dilated.    There is mild, bileaflet mitral valve thickening present. Mild mitral valve regurgitation is present. No significant mitral valve stenosis is present.    Mild tricuspid valve regurgitation is present. Estimated right ventricular systolic pressure from tricuspid regurgitation is mildly elevated (35-45 mmHg).    There is moderate pulmonic valve regurgitation present.      Current medications:  Scheduled Meds:aspirin, 81 mg, Oral, Daily  colchicine, 0.6 mg, Oral, Daily  donepezil, 10 mg, Oral, Nightly  enoxaparin, 40 mg, Subcutaneous, Nightly  finasteride, 5 mg, Oral, Daily  fluticasone, 2 spray, Each Nare, BID  insulin lispro, 2-7 Units, Subcutaneous, 4x Daily AC & at Bedtime  memantine, 10 mg, Oral, BID  metoprolol tartrate, 50 mg, Oral, Q12H  pantoprazole, 40 mg, Oral, Q AM  pravastatin, 40 mg, Oral, Daily  sertraline, 50 mg, Oral, Daily  sodium chloride, 10 mL, Intravenous, Q12H      Continuous Infusions:     PRN Meds:.  acetaminophen    Albuterol Sulfate NEB Orderable    senna-docusate sodium **AND** polyethylene glycol **AND** bisacodyl **AND** bisacodyl    Calcium Replacement - Follow Nurse  / BPA Driven Protocol    dextrose    dextrose    glucagon (human recombinant)    Magnesium Standard Dose Replacement - Follow Nurse / BPA Driven Protocol    melatonin    ondansetron    Phosphorus Replacement - Follow Nurse / BPA Driven Protocol    Potassium Replacement - Follow Nurse / BPA Driven Protocol    sodium chloride    sodium chloride    sodium chloride    Assessment & Plan   Assessment & Plan     Active Hospital Problems    Diagnosis  POA    **Orthostatic hypotension [I95.1]  Yes    Acute on chronic urinary retention [R33.9]  Yes    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 male with history of chronic atrial fibrillation s/p watchman, CKD stage III, chronic urinary retention with self caths, HFmr EF, type 2 diabetes, hypertension hyperlipidemia.  Patient sent from urology clinic with complaints of dizziness and orthostatic hypotension.    This patient's problems and plans were partially entered by my partner and updated as appropriate by me 12/15/24.     Acute UTI, POA  Chronic urine retention with severe left hydronephrosis, improved  - Patient with known history of chronic retention. He self cath 4 times a day. Have been having difficulty self cathing likely secondary to developing urethral stricture  - Seen at urology clinic and sent to the hospital because of hypotension  - UA is concerning for UTI. Urine culture with pansensitive E. coli. Continue Rocephin and transition to p.o. cefdinir upon discharge  - Urology following  - Had a Del Cid catheter placed per urology recs. Repeat CT abdomen after Del Cid catheter placement showed complete resolution of the severe left  hydronephrosis. Dr. Larkin (Urology) recommended to discharge the patient on Del Cid catheter and follow-up with him in the clinic in 2 weeks    Orthostatic hypotension, resolved  - Likely secondary to poor oral intake over the last few days - oral intake improving  - Improved with IV fluids  - Continue metoprolol     HFmrEF, compensated  Paroxysmal A-fib  - Echo from 11/21 with EF 35-to 40%  - Recently had a recent increase in Lasix due to lower extremity edema.  Lasix currently on hold because of hypotension  - Continue metoprolol and amiodarone for rate control   - Not on anticoagulation. Status post Watchman device. Continue aspirin alone  - Appreciate help from cardiology team    Right foot pain secondary to gout flare, improved  - CRP and sedimentation rate are elevated  - X-ray with advanced arthritis but no other acute findings or fractures  - Pain improved with colchicine. Continue treatment for 2 weeks, through 12/23/24    CKD stage III  - Creatinine stable at baseline     Well-controlled type 2 diabetes   - A1c 5.9%  - Continue SSI      Dementia and depression  - Continue Aricept and Namenda  - Continue Zoloft     Chronic weakness  - Case management consulted for short-term rehab    Expected Discharge Location and Transportation: SNF  Expected Discharge   Expected Discharge Date: 12/17/2024; Expected Discharge Time:      VTE Prophylaxis:  Pharmacologic & mechanical VTE prophylaxis orders are present.         AM-PAC 6 Clicks Score (PT): 20 (12/14/24 2030)    CODE STATUS:   Code Status and Medical Interventions: CPR (Attempt to Resuscitate); Full Support   Ordered at: 12/10/24 1050     Level Of Support Discussed With:    Patient     Code Status (Patient has no pulse and is not breathing):    CPR (Attempt to Resuscitate)     Medical Interventions (Patient has pulse or is breathing):    Full Support       BERYL Casarez  12/15/24        Electronically signed by Greer Protillo APRN at 12/15/24  1533       Consult Notes (last 24 hours)  Notes from 12/15/24 1510 through 12/16/24 1510   No notes of this type exist for this encounter.

## 2024-12-16 NOTE — PLAN OF CARE
Goal Outcome Evaluation:  Plan of Care Reviewed With: patient        Progress: no change  Outcome Evaluation: No acute events this shift. AFib, room air. No complaints from patient

## 2024-12-16 NOTE — PLAN OF CARE
Goal Outcome Evaluation:  Plan of Care Reviewed With: patient        Progress: improving  Outcome Evaluation: Pt waiting for placement to [facility] in the next couple of days. No significant changes. VSS

## 2024-12-17 LAB
ANION GAP SERPL CALCULATED.3IONS-SCNC: 7 MMOL/L (ref 5–15)
BUN SERPL-MCNC: 19 MG/DL (ref 8–23)
BUN/CREAT SERPL: 28.8 (ref 7–25)
CALCIUM SPEC-SCNC: 8.8 MG/DL (ref 8.6–10.5)
CHLORIDE SERPL-SCNC: 103 MMOL/L (ref 98–107)
CO2 SERPL-SCNC: 27 MMOL/L (ref 22–29)
CREAT SERPL-MCNC: 0.66 MG/DL (ref 0.76–1.27)
DEPRECATED RDW RBC AUTO: 52.2 FL (ref 37–54)
EGFRCR SERPLBLD CKD-EPI 2021: 90.2 ML/MIN/1.73
ERYTHROCYTE [DISTWIDTH] IN BLOOD BY AUTOMATED COUNT: 14.2 % (ref 12.3–15.4)
GLUCOSE BLDC GLUCOMTR-MCNC: 100 MG/DL (ref 70–130)
GLUCOSE BLDC GLUCOMTR-MCNC: 122 MG/DL (ref 70–130)
GLUCOSE BLDC GLUCOMTR-MCNC: 129 MG/DL (ref 70–130)
GLUCOSE BLDC GLUCOMTR-MCNC: 146 MG/DL (ref 70–130)
GLUCOSE SERPL-MCNC: 157 MG/DL (ref 65–99)
HCT VFR BLD AUTO: 38.1 % (ref 37.5–51)
HGB BLD-MCNC: 11.9 G/DL (ref 13–17.7)
MCH RBC QN AUTO: 30.7 PG (ref 26.6–33)
MCHC RBC AUTO-ENTMCNC: 31.2 G/DL (ref 31.5–35.7)
MCV RBC AUTO: 98.4 FL (ref 79–97)
PLATELET # BLD AUTO: 176 10*3/MM3 (ref 140–450)
PMV BLD AUTO: 10.7 FL (ref 6–12)
POTASSIUM SERPL-SCNC: 4 MMOL/L (ref 3.5–5.2)
RBC # BLD AUTO: 3.87 10*6/MM3 (ref 4.14–5.8)
SODIUM SERPL-SCNC: 137 MMOL/L (ref 136–145)
WBC NRBC COR # BLD AUTO: 5.63 10*3/MM3 (ref 3.4–10.8)

## 2024-12-17 PROCEDURE — 97535 SELF CARE MNGMENT TRAINING: CPT

## 2024-12-17 PROCEDURE — 25010000002 ENOXAPARIN PER 10 MG

## 2024-12-17 PROCEDURE — 99232 SBSQ HOSP IP/OBS MODERATE 35: CPT | Performed by: PHYSICIAN ASSISTANT

## 2024-12-17 PROCEDURE — 85027 COMPLETE CBC AUTOMATED: CPT

## 2024-12-17 PROCEDURE — 97110 THERAPEUTIC EXERCISES: CPT

## 2024-12-17 PROCEDURE — 82948 REAGENT STRIP/BLOOD GLUCOSE: CPT

## 2024-12-17 PROCEDURE — 80048 BASIC METABOLIC PNL TOTAL CA: CPT | Performed by: PHYSICIAN ASSISTANT

## 2024-12-17 RX ORDER — FUROSEMIDE 40 MG/1
40 TABLET ORAL DAILY
Status: DISCONTINUED | OUTPATIENT
Start: 2024-12-17 | End: 2024-12-17

## 2024-12-17 RX ORDER — FUROSEMIDE 20 MG/1
20 TABLET ORAL DAILY
Status: DISCONTINUED | OUTPATIENT
Start: 2024-12-17 | End: 2024-12-18 | Stop reason: HOSPADM

## 2024-12-17 RX ADMIN — Medication 10 ML: at 09:44

## 2024-12-17 RX ADMIN — SERTRALINE HYDROCHLORIDE 50 MG: 50 TABLET ORAL at 09:42

## 2024-12-17 RX ADMIN — DONEPEZIL HYDROCHLORIDE 10 MG: 10 TABLET, FILM COATED ORAL at 20:56

## 2024-12-17 RX ADMIN — FINASTERIDE 5 MG: 5 TABLET, FILM COATED ORAL at 09:43

## 2024-12-17 RX ADMIN — METFORMIN HYDROCHLORIDE 500 MG: 500 TABLET, FILM COATED ORAL at 17:40

## 2024-12-17 RX ADMIN — ASPIRIN 81 MG: 81 TABLET, COATED ORAL at 09:43

## 2024-12-17 RX ADMIN — FLUTICASONE PROPIONATE 2 SPRAY: 50 SPRAY, METERED NASAL at 20:59

## 2024-12-17 RX ADMIN — PRAVASTATIN SODIUM 40 MG: 40 TABLET ORAL at 09:42

## 2024-12-17 RX ADMIN — FUROSEMIDE 20 MG: 20 TABLET ORAL at 16:19

## 2024-12-17 RX ADMIN — MEMANTINE 10 MG: 10 TABLET ORAL at 20:56

## 2024-12-17 RX ADMIN — METOPROLOL TARTRATE 50 MG: 50 TABLET, FILM COATED ORAL at 09:43

## 2024-12-17 RX ADMIN — MEMANTINE 10 MG: 10 TABLET ORAL at 09:43

## 2024-12-17 RX ADMIN — COLCHICINE 0.6 MG: 0.6 TABLET ORAL at 09:42

## 2024-12-17 RX ADMIN — FLUTICASONE PROPIONATE 2 SPRAY: 50 SPRAY, METERED NASAL at 09:45

## 2024-12-17 RX ADMIN — METFORMIN HYDROCHLORIDE 500 MG: 500 TABLET, FILM COATED ORAL at 09:43

## 2024-12-17 RX ADMIN — PANTOPRAZOLE SODIUM 40 MG: 40 TABLET, DELAYED RELEASE ORAL at 06:08

## 2024-12-17 RX ADMIN — ENOXAPARIN SODIUM 40 MG: 100 INJECTION SUBCUTANEOUS at 20:56

## 2024-12-17 RX ADMIN — METOPROLOL TARTRATE 50 MG: 50 TABLET, FILM COATED ORAL at 20:56

## 2024-12-17 NOTE — PLAN OF CARE
Goal Outcome Evaluation:  Plan of Care Reviewed With: patient        Progress: improving  Outcome Evaluation: Patient continues to progress with BADL independence LBD and grooming sinkside.  Pt. limited with independence donning pants due to rahman catheter.  Pt. fatigued before being able to complete oral care sinkside and had to return to recliner.  Pt. with increased knee flexion as fatigues.  Pt. remains appropriate for skilled OT services to address deficit areas  of strength, high level balance and endurance. Due to pt. living alone continue to recommend IRF at discharge prior to home.    Anticipated Discharge Disposition (OT): inpatient rehabilitation facility

## 2024-12-17 NOTE — THERAPY PROGRESS REPORT/RE-CERT
Patient Name: Darien Camarena  : 1935    MRN: [id number]                              Today's Date: 2024       Admit Date: 2024    Visit Dx:     ICD-10-CM ICD-9-CM   1. Orthostasis  I95.1 458.0   2. Light headedness  R42 780.4   3. Chronic atrial fibrillation with rapid ventricular response  I48.20 427.31   4. Acute on chronic urinary retention  R33.9 788.29     Patient Active Problem List   Diagnosis    Type 2 diabetes mellitus with stage 3 chronic kidney disease, without long-term current use of insulin    Gastroesophageal reflux disease with esophagitis    Hyperlipidemia LDL goal <100    Essential hypertension    Nephrolithiasis    Obstructive sleep apnea syndrome    Permanent atrial fibrillation    Stage 3 chronic kidney disease    Coronary artery disease involving native coronary artery of native heart without angina pectoris    Malignant neoplasm of lower lobe of left lung    H/O: GI bleed    Presence of Watchman left atrial appendage closure device    Orthostatic hypotension    Heart failure with mildly reduced ejection fraction (HFmrEF)    Obesity    Choledocholithiasis    Severe malnutrition    Esophagitis    Encounter for removal of biliary stent    Acute on chronic urinary retention     Past Medical History:   Diagnosis Date    Arrhythmia     Atrial fibrillation     Chronic kidney disease     COPD (chronic obstructive pulmonary disease)     Coronary artery disease     Dementia     Diabetes mellitus     doesnt check sugar    E. coli sepsis 2022    Enlarged prostate without lower urinary tract symptoms (luts) 2016    Full dentures     GERD (gastroesophageal reflux disease)     Gout     Hearing aid worn     bilat prn    History of colonoscopy 2012    History of radiation therapy 2023    SBRT LLL lung    [place] (hard of hearing)     hearing aids prn    Hyperlipidemia     Hypertension     Kidney stone     surgery x1    Lung cancer     STEVEN on CPAP     compliant with  machine    PAF (paroxysmal atrial fibrillation)     Prostatism     Urinary incontinence     Urinary tract infection     Wears glasses     readers     Past Surgical History:   Procedure Laterality Date    ATRIAL APPENDAGE EXCLUSION LEFT WITH TRANSESOPHAGEAL ECHOCARDIOGRAM N/A 11/28/2023    Procedure: Atrial Appendage Occlusion;  Surgeon: Anthony Mcdaniel MD;  Location: [facility] INVASIVE LOCATION;  Service: Cardiovascular;  Laterality: N/A;    CARDIAC CATHETERIZATION Left 09/29/2022    Procedure: Left Heart Cath;  Surgeon: Titus Oliveros IV, MD;  Location:  [facility];  Service: Cardiovascular;  Laterality: Left;    CARDIOVERSION      CATARACT EXTRACTION Bilateral     COLONOSCOPY      CYSTOSCOPY      ENDOSCOPY      possible    ENDOSCOPY N/A 9/5/2024    Procedure: ESOPHAGOGASTRODUODENOSCOPY;  Surgeon: Anthony Arambula MD;  Location:  [facility];  Service: Gastroenterology;  Laterality: N/A;  Esophagus dilated to 18mm with 12mm-mm to 15mm, then 15mm to 18mm balloon.    ENDOSCOPY N/A 10/31/2024    Procedure: ESOPHAGOGASTRODUODENOSCOPY WITH BIOPSY;  Surgeon: Carlos Dior MD;  Location:  [facility];  Service: Gastroenterology;  Laterality: N/A;    ERCP N/A 9/5/2024    Procedure: ENDOSCOPIC RETROGRADE CHOLANGIOPANCREATOGRAPHY;  Surgeon: Anthony Arambula MD;  Location:  [facility];  Service: Gastroenterology;  Laterality: N/A;  Sphincterotomy made to ampula of common bile duct (CBD), CBD swept with 9mm-12mm balloon - sludge, stones and purulent appearing drainage extracted. 10x7 [place] biliary stent depolyed into CBD. ERCP scope removed with balloon intact.    ERCP N/A 10/31/2024    Procedure: ENDOSCOPIC RETROGRADE CHOLANGIOPANCREATOGRAPHY;  Surgeon: Carlos Dior MD;  Location:  [facility];  Service: Gastroenterology;  Laterality: N/A;    KIDNEY STONE SURGERY      x1      General Information       Row Name 12/17/24 0900          OT Time and Intention     Subjective Information complains of;weakness  -AMELIA     Document Type progress note/recertification  -AMELIA     Mode of Treatment individual therapy;occupational therapy  -AMELIA     Patient Effort good  -AMELIA     Symptoms Noted During/After Treatment fatigue  -       Row Name 12/17/24 0900          General Information    Patient Profile Reviewed yes  -AMELIA     Existing Precautions/Restrictions fall;other (see comments)  -AMELIA     Barriers to Rehab none identified  -       Row Name 12/17/24 0900          Cognition    Orientation Status (Cognition) oriented x 4  -       Row Name 12/17/24 0900          Safety Issues/Impairments Affecting Functional Mobility    Safety Issues Affecting Function (Mobility) safety precaution awareness;sequencing abilities  -AMELIA     Impairments Affecting Function (Mobility) balance;endurance/activity tolerance;strength  -AMELIA               User Key  (r) = Recorded By, (t) = Taken By, (c) = Cosigned By      Initials Name Provider Type    Claire Diaz OT Occupational Therapist                     Mobility/ADL's       Row Name 12/17/24 0901          Bed Mobility    Supine-Sit [place] (Bed Mobility) modified independence  -     Assistive Device (Bed Mobility) bed rails;head of bed elevated  -       Row Name 12/17/24 0901          Transfers    Transfers sit-stand transfer;stand-sit transfer  -       Row Name 12/17/24 0901          Sit-Stand Transfer    Sit-Stand [place] (Transfers) contact guard;verbal cues  -     Assistive Device (Sit-Stand Transfers) walker, front-wheeled  -     Comment, (Sit-Stand Transfer) pt. trying to pull up on walker, cues to push with at least one hand off bed  -       Row Name 12/17/24 0901          Stand-Sit Transfer    Stand-Sit [place] (Transfers) standby assist;verbal cues  -     Assistive Device (Stand-Sit Transfers) walker, front-wheeled  -       Row Name 12/17/24 0901          Functional Mobility    Functional Mobility- Ind. Level  contact guard assist  -     Functional Mobility- Device walker, front-wheeled  -     Functional Mobility-Distance (Feet) 8  16  -AMELIA     Functional Mobility- Safety Issues step length decreased  -AMELIA     Functional Mobility- Comment as pt. fatigued with more bend to knees as stepping  -       Row Name 12/17/24 0901          Activities of Daily Living    BADL Assessment/Intervention lower body dressing;grooming;feeding  -       Row Name 12/17/24 0901          Self-Feeding Assessment/Training    [place] Level (Feeding) liquids to mouth;independent  -AMELIA     Position (Feeding) supported sitting  -       Row Name 12/17/24 0901          Lower Body Dressing Assessment/Training    [place] Level (Lower Body Dressing) minimum assist (75% patient effort);pants/bottoms  -AMELIA     Position (Lower Body Dressing) unsupported sitting;unsupported standing  -AMELIA     Comment, (Lower Body Dressing) pt. was able to straighten socks, donned pants with assist for rahman catheter and tying pants  -       Row Name 12/17/24 0901          Grooming Assessment/Training    [place] Level (Grooming) hair care, combing/brushing;wash face, hands;set up;standby assist  -AMELIA     Position (Grooming) sink side;unsupported standing  -AMELIA     Comment, (Grooming) pt. getting fatigued and having to return to recliner to pieter dentures  -               User Key  (r) = Recorded By, (t) = Taken By, (c) = Cosigned By      Initials Name Provider Type    Claire Diaz, OT Occupational Therapist                   Obj/Interventions       Row Name 12/17/24 0908          Shoulder (Therapeutic Exercise)    Shoulder (Therapeutic Exercise) AROM (active range of motion)  -     Shoulder AROM (Therapeutic Exercise) bilateral;flexion;extension;aBduction;aDduction;horizontal aBduction/aDduction;sitting;15 repititions  short rest between each  -       Row Name 12/17/24 0908          Elbow/Forearm (Therapeutic Exercise)    Elbow/Forearm  (Therapeutic Exercise) strengthening exercise  -AMELIA     Elbow/Forearm Strengthening (Therapeutic Exercise) bilateral;flexion;extension;sitting;15 repititions  mild manual resistance given  -       Row Name 12/17/24 0908          Motor Skills    Therapeutic Exercise shoulder;elbow/forearm  -       Row Name 12/17/24 0908          Balance    Static Sitting Balance supervision  -AMELIA     Dynamic Sitting Balance standby assist  -AMELIA     Position, Sitting Balance unsupported;sitting edge of bed  -AMELIA     Sit to Stand Dynamic Balance contact guard;verbal cues  -AMELIA     Static Standing Balance standby assist  -AMELIA     Dynamic Standing Balance standby assist  -AMELIA     Position/Device Used, Standing Balance supported;walker, front-wheeled  -AMELIA     Balance Interventions sit to stand;occupation based/functional task  -AMELIA     Comment, Balance grooming, LBD, UE TE  -AMELIA               User Key  (r) = Recorded By, (t) = Taken By, (c) = Cosigned By      Initials Name Provider Type    Claire Diaz, OT Occupational Therapist                   Goals/Plan       Row Name 12/17/24 0912          Transfer Goal 1 (OT)    Activity/Assistive Device (Transfer Goal 1, OT) commode;bed-to-chair/chair-to-bed  -AMELIA     [place] Level/Cues Needed (Transfer Goal 1, OT) supervision required  -AMELIA     Time Frame (Transfer Goal 1, OT) long term goal (LTG);10 days  -AMELIA     Progress/Outcome (Transfer Goal 1, OT) goal ongoing;continuing progress toward goal  -       Row Name 12/17/24 0912          Dressing Goal 1 (OT)    Activity/Device (Dressing Goal 1, OT) upper body dressing;lower body dressing  -AMELIA     [place]/Cues Needed (Dressing Goal 1, OT) standby assist  -AMELIA     Time Frame (Dressing Goal 1, OT) short term goal (STG);5 days  -AMELIA     Progress/Outcome (Dressing Goal 1, OT) continuing progress toward goal;goal ongoing  -       Row Name 12/17/24 0912          Toileting Goal 1 (OT)    Activity/Device (Toileting Goal 1, OT) toileting skills,  all;adjust/manage clothing;perform perineal hygiene  -AMELIA     [place] Level/Cues Needed (Toileting Goal 1, OT) standby assist;set-up required  -AMELIA     Time Frame (Toileting Goal 1, OT) long term goal (LTG);10 days  -AMELIA     Strategies/Barriers (Toileting Goal 1, OT) post BM  -AMELIA     Progress/Outcome (Toileting Goal 1, OT) new goal  -AMELIA       Row Name 12/17/24 0912          Grooming Goal 1 (OT)    Activity/Device (Grooming Goal 1, OT) hair care;oral care;wash face, hands  -AMELIA     [place] (Grooming Goal 1, OT) supervision required  -AMELIA     Time Frame (Grooming Goal 1, OT) long term goal (LTG);10 days  -AMELIA     Progress/Outcome (Grooming Goal 1, OT) goal ongoing;goal partially met  -AMELIA               User Key  (r) = Recorded By, (t) = Taken By, (c) = Cosigned By      Initials Name Provider Type    Claire Diaz, OT Occupational Therapist                   Clinical Impression       Row Name 12/17/24 0909          Pain Assessment    Pretreatment Pain Rating 0/10 - no pain  -AMELIA     Posttreatment Pain Rating 0/10 - no pain  -AMELIA       Row Name 12/17/24 0909          Plan of Care Review    Plan of Care Reviewed With patient  -AMELIA     Progress improving  -AMELIA     Outcome Evaluation Patient continues to progress with BADL independence LBD and grooming sinkside.  Pt. limited with independence donning pants due to rahman catheter.  Pt. fatigued before being able to complete oral care sinkside and had to return to recliner.  Pt. with increased knee flexion as fatigues.  Pt. remains appropriate for skilled OT services to address deficit areas  of strength, high level balance and endurance. Due to pt. living alone continue to recommend IRF at discharge prior to home.  -AMELIA       Row Name 12/17/24 0909          Therapy Assessment/Plan (OT)    Rehab Potential (OT) good  -AMELIA     Therapy Frequency (OT) daily  -AMELIA       Row Name 12/17/24 0909          Therapy Plan Review/Discharge Plan (OT)    Anticipated Discharge Disposition  (OT) inpatient rehabilitation facility  -       Row Name 12/17/24 0909          Vital Signs    Pre Systolic BP Rehab 139  nurse cleared for therapy  -AMELIA     Pre Treatment Diastolic   -AMELIA     Post Systolic BP Rehab 148  -AMELIA     Post Treatment Diastolic   -AMELIA     Pretreatment Heart Rate (beats/min) 103  -AMELIA     Posttreatment Heart Rate (beats/min) 114  -AMELIA     Pre SpO2 (%) 98  -AMELIA     O2 Delivery Pre Treatment room air  -AMELIA     O2 Delivery Intra Treatment room air  -AMELIA     Post SpO2 (%) 95  -AMELIA     O2 Delivery Post Treatment room air  -AMELIA     Pre Patient Position Supine  -AMELIA     Intra Patient Position Standing  -AMELIA     Post Patient Position Sitting  -AMELIA       Row Name 12/17/24 0909          Positioning and Restraints    Pre-Treatment Position in bed  -AMELIA     Post Treatment Position chair  -AMELIA     In Chair reclined;call light within reach;encouraged to call for assist;exit alarm on;notified nsg;L heel elevated;waffle cushion;legs elevated  nurse cleared for therapy  -AMELIA               User Key  (r) = Recorded By, (t) = Taken By, (c) = Cosigned By      Initials Name Provider Type    Claire Diaz OT Occupational Therapist                   Outcome Measures       Row Name 12/17/24 0913          How much help from another is currently needed...    Putting on and taking off regular lower body clothing? 3  -AMELIA     Bathing (including washing, rinsing, and drying) 3  -AMELIA     Toileting (which includes using toilet bed pan or urinal) 3  -AMELIA     Putting on and taking off regular upper body clothing 3  -AMELIA     Taking care of personal grooming (such as brushing teeth) 4  sitting  -AMELIA     Eating meals 4  -AMELIA     AM-PAC 6 Clicks Score (OT) 20  -AMELIA       Row Name 12/17/24 0913          Functional Assessment    Outcome Measure Options AM-PAC 6 Clicks Daily Activity (OT)  -AMELIA               User Key  (r) = Recorded By, (t) = Taken By, (c) = Cosigned By      Initials Name Provider Type    Claire Diaz OT  Occupational Therapist                    Occupational Therapy Education       Title: PT OT SLP Therapies (In Progress)       Topic: Occupational Therapy (In Progress)       Point: ADL training (In Progress)       Description:   Instruct learner(s) on proper safety adaptation and remediation techniques during self care or transfers.   Instruct in proper use of assistive devices.                  Learning Progress Summary            Patient Acceptance, E,D, NR by AMELIA at 12/17/2024 0914    Comment: ADL progression for home independence, UE TE, transfer safety    Acceptance, E,TB, VU,DU by KF at 12/13/2024 0936    Acceptance, E, NR by LR at 12/11/2024 1352    Acceptance, E,TB, VU,DU by KF at 12/6/2024 0846   Family Acceptance, E, NR by LR at 12/11/2024 1352                      Point: Home exercise program (In Progress)       Description:   Instruct learner(s) on appropriate technique for monitoring, assisting and/or progressing therapeutic exercises/activities.                  Learning Progress Summary            Patient Acceptance, E,D, NR by AMELIA at 12/17/2024 0914    Comment: ADL progression for home independence, UE TE, transfer safety    Acceptance, E, NR by LR at 12/11/2024 1352   Family Acceptance, E, NR by LR at 12/11/2024 1352                      Point: Precautions (In Progress)       Description:   Instruct learner(s) on prescribed precautions during self-care and functional transfers.                  Learning Progress Summary            Patient Acceptance, E,D, NR by AMELIA at 12/17/2024 0914    Comment: ADL progression for home independence, UE TE, transfer safety    Acceptance, E,TB, VU,DU by KF at 12/13/2024 0936    Acceptance, E, NR by LR at 12/11/2024 1352    Acceptance, E,TB, VU,DU by KF at 12/6/2024 0846   Family Acceptance, E, NR by LR at 12/11/2024 1352                      Point: Body mechanics (In Progress)       Description:   Instruct learner(s) on proper positioning and spine alignment during  self-care, functional mobility activities and/or exercises.                  Learning Progress Summary            Patient Acceptance, E,TB, VU,DU by KF at 12/13/2024 0936    Acceptance, E, NR by LR at 12/11/2024 1352    Acceptance, E,TB, VU,DU by KF at 12/6/2024 0846   Family Acceptance, E, NR by LR at 12/11/2024 1352                                      User Key       Initials Effective Dates Name Provider Type Discipline    AMELIA 07/11/23 -  Claire Pedraza, OT Occupational Therapist OT    KF 08/09/23 -  Coleen Tsai, OT Occupational Therapist OT    LR 10/09/24 -  Daniela Jo, OT Occupational Therapist OT                  OT Recommendation and Plan  Therapy Frequency (OT): daily  Plan of Care Review  Plan of Care Reviewed With: patient  Progress: improving  Outcome Evaluation: Patient continues to progress with BADL independence LBD and grooming sinkside.  Pt. limited with independence donning pants due to rahman catheter.  Pt. fatigued before being able to complete oral care sinkside and had to return to recliner.  Pt. with increased knee flexion as fatigues.  Pt. remains appropriate for skilled OT services to address deficit areas  of strength, high level balance and endurance. Due to pt. living alone continue to recommend IRF at discharge prior to home.     Time Calculation:         Time Calculation- OT       Row Name 12/17/24 0914             Time Calculation- OT    OT Start Time 0807  -AMELIA      OT Received On 12/17/24  -AMELIA      OT Goal Re-Cert Due Date 12/27/24  -AMELIA         Timed Charges    54698 - OT Therapeutic Exercise Minutes 8  -AMELIA      85716 - OT Therapeutic Activity Minutes 5  -AMELIA      18556 - OT Self Care/Mgmt Minutes 12  -AMELIA         Total Minutes    Timed Charges Total Minutes 25  -AMELIA       Total Minutes 25  -AMELIA                User Key  (r) = Recorded By, (t) = Taken By, (c) = Cosigned By      Initials Name Provider Type    Claire Diaz, OT Occupational Therapist                  Therapy  Charges for Today       Code Description Service Date Service Provider Modifiers Qty    15479305098 HC OT THER PROC EA 15 MIN 12/17/2024 Claire Pedraza, OT GO 1    46012614254 HC OT SELF CARE/MGMT/TRAIN EA 15 MIN 12/17/2024 Claire Pedraza OT GO 1                 Claire Pedraza OT  12/17/2024

## 2024-12-17 NOTE — CASE MANAGEMENT/SOCIAL WORK
Continued Stay Note  [facility]     Patient Name: Darien Camarena  MRN: [id number]  Today's Date: 12/17/2024    Admit Date: 12/5/2024    Plan: [facility] - precert pending   Discharge Plan       Row Name 12/17/24 1234       Plan    Plan [facility] - precert pending    Plan Comments Pt walked 24ft with contact guard and a walker today. He is on room air. Per MD, Pt will be discharged with rahman will f/u with Dr Larkin for possible suprapubic placement. Precert was started today for SNF at [facility]. Daughter will provide transportation. CM will continue to follow.    *Addendum: Prior auth approved for SNF @ [facility]. Per Kimberly, bed will be available tomorrow after 1000. Pt, daughters, provider and RN aware. Daughter will provide transport.     Final Discharge Disposition Code 03 - skilled nursing facility (SNF)                   Discharge Codes    No documentation.                 Expected Discharge Date and Time       Expected Discharge Date Expected Discharge Time    Dec 17, 2024               Pat Lake, RN

## 2024-12-17 NOTE — PLAN OF CARE
Goal Outcome Evaluation:  Plan of Care Reviewed With: patient        Progress: no change  Outcome Evaluation: Patient rested througghout the night without complaints. On room air while awake, plqced on 2L NC intermittently while sleeping. AFib on tele

## 2024-12-17 NOTE — PROGRESS NOTES
[facility] Medicine Services  PROGRESS NOTE    Patient Name: Darien Camarena  : 1935  MRN: [id number]    Date of Admission: 2024  Primary Care Physician: Pito Way MD    Subjective     CC: f/u UTI, urethral stricture    HPI:  Sitting in chair, watching videos on his iPad. No new complaints. To SNF tomorrow    Objective     Vital Signs:   Temp:  [97.5 °F (36.4 °C)-98.2 °F (36.8 °C)] 98.2 °F (36.8 °C)  Heart Rate:  [] 113  Resp:  [16-22] 16  BP: (139-149)/() 139/98  Flow (L/min) (Oxygen Therapy):  [2] 2     Physical Exam:  Constitutional: No acute distress, awake, alert and conversant. Sitting upright in chair   HENT: NCAT, mucous membranes moist  Respiratory: Clear to auscultation bilaterally, respiratory effort normal   Cardiovascular: Irregularly irregular rhythm  Gastrointestinal: Positive bowel sounds, soft, nontender, nondistended  : Del Cid catheter in place with straw colored urine in ba   Musculoskeletal: No bilateral ankle edema  Psychiatric: Appropriate affect, cooperative with exam  Neurologic: Oriented x 3, moves all extremities spontaneously without focal deficits, speech clear    Results Reviewed:  LAB RESULTS:      Lab 24  0601 24  0410   WBC 5.63 5.97   HEMOGLOBIN 11.9* 11.8*   HEMATOCRIT 38.1 37.0*   PLATELETS 176 174   NEUTROS ABS  --  3.60   IMMATURE GRANS (ABS)  --  0.02   LYMPHS ABS  --  1.35   MONOS ABS  --  0.45   EOS ABS  --  0.48*   MCV 98.4* 96.9   SED RATE  --  85*   CRP  --  2.65*         Lab 24  1253 24  0410   SODIUM 137 138   POTASSIUM 4.0 4.1   CHLORIDE 103 105   CO2 27.0 26.0   ANION GAP 7.0 7.0   BUN 19 16   CREATININE 0.66* 0.64*   EGFR 90.2 91.1   GLUCOSE 157* 97   CALCIUM 8.8 8.6   MAGNESIUM  --  1.9         Lab 24  0410   TOTAL PROTEIN 6.1   ALBUMIN 2.5*   GLOBULIN 3.6   ALT (SGPT) 31   AST (SGOT) 57*   BILIRUBIN 0.4   ALK PHOS 172*     Brief Urine Lab Results  (Last result in the  past 365 days)        Color   Clarity   Blood   Leuk Est   Nitrite   Protein   CREAT   Urine HCG        12/05/24 1432 Yellow   Clear   Negative   Moderate (2+)   Positive   Negative                 Microbiology Results Abnormal       Procedure Component Value - Date/Time    Urine Culture - Urine, Urine, Catheter [[id number]]  (Abnormal) Collected: 12/05/24 1432    Lab Status: Final result Specimen: Urine, Catheter Updated: 12/07/24 1028     Urine Culture >100,000 CFU/mL Escherichia coli    Narrative:      Refer to previous urine culture collected on 12/05/2024 1610 for MICs    Colonization of the urinary tract without infection is common. Treatment is discouraged unless the patient is symptomatic, pregnant, or undergoing an invasive urologic procedure.          No radiology results from the last 24 hrs    Results for orders placed during the hospital encounter of 11/21/24    Adult Transesophageal Echo 3D (FARHAD) W/ Cont If Necessary Per Protocol    Interpretation Summary    There is a 27mm Watchman FLX device in place. It appears well seated and well compressed with no device related thrombus seen. There is a small jet of color flow, 2.5 mm, at the superior aspect adjacent to the left upper pulmonary vein. This was not seen previous examination however image quality is improved compared to prior.    Left ventricular systolic function is moderately decreased. Left ventricular ejection fraction appears to be 36 - 40%. Normal left ventricular wall thickness noted. The left ventricular cavity is dilated. There is left ventricular global hypokinesis noted.    : The right ventricular cavity is mildly dilated. Mildly reduced right ventricular systolic function noted.    : The left atrial cavity is severely dilated. No evidence of a left atrial thrombus present. There is light spontaneous echo contrast present in the atrial body.    The right atrial cavity is severely dilated.    There is mild, bileaflet mitral valve  thickening present. Mild mitral valve regurgitation is present. No significant mitral valve stenosis is present.    Mild tricuspid valve regurgitation is present. Estimated right ventricular systolic pressure from tricuspid regurgitation is mildly elevated (35-45 mmHg).    There is moderate pulmonic valve regurgitation present.    Current medications:  Scheduled Meds:aspirin, 81 mg, Oral, Daily  colchicine, 0.6 mg, Oral, Daily  donepezil, 10 mg, Oral, Nightly  enoxaparin, 40 mg, Subcutaneous, Nightly  finasteride, 5 mg, Oral, Daily  fluticasone, 2 spray, Each Nare, BID  memantine, 10 mg, Oral, BID  metFORMIN, 500 mg, Oral, BID With Meals  metoprolol tartrate, 50 mg, Oral, Q12H  pantoprazole, 40 mg, Oral, Q AM  pravastatin, 40 mg, Oral, Daily  sertraline, 50 mg, Oral, Daily  sodium chloride, 10 mL, Intravenous, Q12H      Continuous Infusions:     PRN Meds:.  acetaminophen    Albuterol Sulfate NEB Orderable    senna-docusate sodium **AND** polyethylene glycol **AND** bisacodyl **AND** bisacodyl    Calcium Replacement - Follow Nurse / BPA Driven Protocol    dextrose    dextrose    glucagon (human recombinant)    Magnesium Standard Dose Replacement - Follow Nurse / BPA Driven Protocol    melatonin    ondansetron    Phosphorus Replacement - Follow Nurse / BPA Driven Protocol    Potassium Replacement - Follow Nurse / BPA Driven Protocol    sodium chloride    sodium chloride    sodium chloride    Assessment & Plan     Active Hospital Problems    Diagnosis  POA    **Orthostatic hypotension [I95.1]  Yes    Acute on chronic urinary retention [R33.9]  Yes    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 He was sent to [facility] ED on 12/5/24 from urology clinic for evaluation of dizziness and orthostatic hypotension.    Acute UTI, POA  Chronic urinary retention with severe left hydronephrosis, improved  - Patient with known history of chronic retention. He self cath 4 times a day. Had been having difficulty self cathing likely secondary to developing urethral stricture  - Seen at urology clinic and sent to the hospital because of hypotension  - UA concerning for UTI.   - Urine culture with pansensitive E. coli. Has completed a course of antibiotics   - Urology following  - Had a Del Cid catheter placed on 12/5 per urology recs. Repeat CT abdomen after Del Cid catheter placement showed complete resolution of the severe left hydronephrosis.   - Dr. Larkin (Urology) recommended to discharge the patient on Del Cid catheter and follow-up with him in the clinic in 2 weeks  - Needs catheter exchanged Q30 days    Orthostatic hypotension, resolved  - Likely secondary to poor oral intake  - s/p IV fluids with improvement.   - PO intake improving     HFrEF, compensated  Chronic atrial atrial fibrillation  - Echo from 11/21/24 with EF 35-to 40%  - Cardiology followed this admission   - Not anticoagulated - s/p Watchman device placement. Continue ASA   - Recently had a recent increase in Lasix due to lower extremity edema.   - Lasix placed on hold on admission due to orthostatic hypotension - will restart today. Start with 20mg   - Recheck orthostatics in AM   - Continue Metoprolol and Amiodarone    Right foot pain secondary to gout flare, improved  - CRP and sedimentation rate are elevated  - X-ray with advanced arthritis but no other acute findings or fractures  - Pain improved with colchicine. Continue treatment for 2 weeks, through 12/23/24    CKD stage III  - Creatinine stable / at  baseline  - AM BMP      Well-controlled type 2 diabetes   - A1c 5.9%  - Add Metformin 500mg BID  - Stop SSI      Dementia and depression  - Continue Aricept and Namenda  - Continue Zoloft     Chronic weakness  - Case management consulted for short-term rehab    Expected Discharge Location and Transportation: SNF  Expected Discharge  Expected Discharge Date: 12/18/2024; Expected Discharge Time:      VTE Prophylaxis:Pharmacologic & mechanical VTE prophylaxis orders are present.    AM-PAC 6 Clicks Score (PT): 18 (12/16/24 2023)    CODE STATUS:   Code Status and Medical Interventions: CPR (Attempt to Resuscitate); Full Support   Ordered at: 12/10/24 1050     Level Of Support Discussed With:    Patient     Code Status (Patient has no pulse and is not breathing):    CPR (Attempt to Resuscitate)     Medical Interventions (Patient has pulse or is breathing):    Full Support     Lizet Mitchell PA-C  12/17/24

## 2024-12-18 VITALS
SYSTOLIC BLOOD PRESSURE: 137 MMHG | RESPIRATION RATE: 18 BRPM | WEIGHT: 200 LBS | OXYGEN SATURATION: 92 % | HEART RATE: 94 BPM | HEIGHT: 75 IN | TEMPERATURE: 97.4 F | BODY MASS INDEX: 24.87 KG/M2 | DIASTOLIC BLOOD PRESSURE: 103 MMHG

## 2024-12-18 PROBLEM — N35.919 URETHRAL STRICTURE: Status: ACTIVE | Noted: 2024-12-18

## 2024-12-18 PROBLEM — N13.30 HYDROURETERONEPHROSIS: Status: ACTIVE | Noted: 2024-12-18

## 2024-12-18 PROBLEM — K20.90 ESOPHAGITIS: Status: RESOLVED | Noted: 2024-09-09 | Resolved: 2024-12-18

## 2024-12-18 PROBLEM — Z46.89 ENCOUNTER FOR REMOVAL OF BILIARY STENT: Status: RESOLVED | Noted: 2024-09-09 | Resolved: 2024-12-18

## 2024-12-18 PROBLEM — K80.50 CHOLEDOCHOLITHIASIS: Status: RESOLVED | Noted: 2024-09-05 | Resolved: 2024-12-18

## 2024-12-18 PROBLEM — I95.1 ORTHOSTATIC HYPOTENSION: Status: RESOLVED | Noted: 2024-01-26 | Resolved: 2024-12-18

## 2024-12-18 PROBLEM — R91.1 NODULE OF LOWER LOBE OF LEFT LUNG: Status: ACTIVE | Noted: 2023-01-30

## 2024-12-18 LAB — GLUCOSE BLDC GLUCOMTR-MCNC: 87 MG/DL (ref 70–130)

## 2024-12-18 PROCEDURE — 82948 REAGENT STRIP/BLOOD GLUCOSE: CPT

## 2024-12-18 PROCEDURE — 99239 HOSP IP/OBS DSCHRG MGMT >30: CPT | Performed by: PHYSICIAN ASSISTANT

## 2024-12-18 RX ORDER — DOCUSATE SODIUM 100 MG/1
200 CAPSULE, LIQUID FILLED ORAL DAILY
Start: 2024-12-18

## 2024-12-18 RX ORDER — FUROSEMIDE 20 MG/1
20 TABLET ORAL DAILY
Start: 2024-12-18

## 2024-12-18 RX ORDER — COLCHICINE 0.6 MG/1
0.6 TABLET ORAL DAILY
Start: 2024-12-19

## 2024-12-18 RX ORDER — PRAVASTATIN SODIUM 40 MG
40 TABLET ORAL NIGHTLY
Start: 2024-12-18

## 2024-12-18 RX ADMIN — PRAVASTATIN SODIUM 40 MG: 40 TABLET ORAL at 08:22

## 2024-12-18 RX ADMIN — MEMANTINE 10 MG: 10 TABLET ORAL at 08:22

## 2024-12-18 RX ADMIN — SERTRALINE HYDROCHLORIDE 50 MG: 50 TABLET ORAL at 08:22

## 2024-12-18 RX ADMIN — FUROSEMIDE 20 MG: 20 TABLET ORAL at 08:22

## 2024-12-18 RX ADMIN — METOPROLOL TARTRATE 50 MG: 50 TABLET, FILM COATED ORAL at 08:22

## 2024-12-18 RX ADMIN — ASPIRIN 81 MG: 81 TABLET, COATED ORAL at 08:22

## 2024-12-18 RX ADMIN — FINASTERIDE 5 MG: 5 TABLET, FILM COATED ORAL at 08:22

## 2024-12-18 RX ADMIN — FLUTICASONE PROPIONATE 2 SPRAY: 50 SPRAY, METERED NASAL at 08:26

## 2024-12-18 RX ADMIN — METFORMIN HYDROCHLORIDE 500 MG: 500 TABLET, FILM COATED ORAL at 08:22

## 2024-12-18 RX ADMIN — PANTOPRAZOLE SODIUM 40 MG: 40 TABLET, DELAYED RELEASE ORAL at 06:02

## 2024-12-18 RX ADMIN — COLCHICINE 0.6 MG: 0.6 TABLET ORAL at 08:22

## 2024-12-18 NOTE — DISCHARGE SUMMARY
[facility] Medicine Services  DISCHARGE SUMMARY    Patient Name: Darien Camarena  : 1935  MRN: [id number]    Date of Admission: 2024 10:15 AM  Date of Discharge:  2024  Primary Care Physician: Pito Way MD    Consults       Date and Time Order Name Status Description    2024  4:49 PM Inpatient Urology Consult Completed     2024  4:49 PM Inpatient Cardiology Consult Completed           Hospital Course     Active Hospital Problems    Diagnosis  POA    Hydroureteronephrosis [N13.30]  Yes    Urethral stricture [N35.919]  Yes    Acute on chronic urinary retention [R33.9]  Yes    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]  Yes    Presence of Watchman left atrial appendage closure device [Z95.818]  Yes    Coronary artery disease involving native coronary artery of native heart without angina pectoris [I25.10]  Yes    Stage 3 chronic kidney disease [N18.30]  Yes    Permanent atrial fibrillation [I48.21]  Yes    Essential hypertension [I10]  Yes    Obstructive sleep apnea syndrome [G47.33]  Yes    Type 2 diabetes mellitus with stage 3 chronic kidney disease, without long-term current use of insulin [E11.22, N18.30]  Yes      Resolved Hospital Problems    Diagnosis Date Resolved POA    **Orthostatic hypotension [I95.1] 2024 Yes    A-fib [I48.91] 2024 Yes      Hospital Course:  Darien Camarena is an 89yoM with PMH significant for HTN, HLD, chronic HFrEF, chronic atrial fibrillation s/p watchman, DMII, CKD stage III and chronic urinary retention (self caths). Presented to urology office on  due to recent difficulty passing catheters through urethra. While in office, he also complained of dizziness x 1 week and was noted to be hypotensive with BP 92/60. He was directed to go to the ED for further evaluation. In ED, found to have orthostatic hypotension, UTI and severe L hydronephrosis.      Acute E. Coli UTI,  POA   Chronic urinary retention with severe left hydronephrosis, improved  - Urology followed - appreciate assistance   - Recent difficulty self cathing likely secondary to developing urethral stricture  - Del Cid catheter placed on 12/5. Urology recommends to discharge with Del Cid catheter in place and follow up outpatient in 2 weeks   - UA concerning for UTI.   - Urine culture grew pan-susecptible E. Coli. He has completed a course of antibiotics.   - Urine culture with pansensitive E. coli. Has completed a course of antibiotics   - Resume home Methenamine at [facility]      Orthostatic hypotension, resolved  - Likely secondary to poor oral intake and increased Lasix dose   - s/p IV fluids with improvement.   - PO intake improving     HFrEF, compensated  Chronic atrial atrial fibrillation  - Echo from 11/21/24 with EF 35-to 40%  - Cardiology followed this admission   - Not anticoagulated - s/p Watchman device placement. Continue ASA   - Recently had a recent increase in Lasix due to lower extremity edema  - Lasix on hold during most of this admission - patient has remained euvolemic with minimal lower extremity edema. Will DC on previous dose of Lasix - 20mg PO daily  - Continue Metoprolol and Amiodarone    LLL lung nodule   - 4/5/24 CT Chest showed stable 1.5cm LLL lung nodule with increased attenuation, similar to prior studies  - CT chest on 12/9 showed moderate L pleural efffusion and bibasilar atelectasis that obscured the region of the previously identified LLL lung nodule.   - Needs repeat CT chest in ~3 months   - Will refer to lung nodule clinic at [facility]      Right foot pain secondary to gout flare, improved  - CRP and sedimentation rate are elevated  - X-ray with advanced arthritis but no other acute findings or fractures  - Pain improved with colchicine. Plan to continue treatment for 2 weeks, through 12/23/24  - Resume Allopurinol at [facility]      CKD stage III  - Creatinine stable / at baseline     Well-controlled type 2  diabetes   - A1c 5.9%  - Continue Metformin 500mg BID at [facility]      Dementia and depression  - Continue Aricept and Namenda  - Continue Zoloft     Chronic weakness  - PT/OT followed - to [facility] for rehab today     Day of Discharge     HPI:   Resting in bed. Woke up early around 0400 and couldn't fall back asleep. Otherwise, no new complaints. Denies chest pain or dyspnea. No abdominal pain, nausea or vomiting. Bowels moving. Does not feel that his lower extremities are edematous     Review of Systems  Gen- No fevers, chills  CV- No chest pain, palpitations  Resp- No cough, dyspnea  GI- No N/V/D, abd pain    Vital Signs:   Temp:  [97.4 °F (36.3 °C)-98.3 °F (36.8 °C)] 97.4 °F (36.3 °C)  Heart Rate:  [] 111  Resp:  [16-18] 18  BP: (120-145)/() 137/103    Physical Exam:  Constitutional: No acute distress, awake, alert and conversant. Sitting upright in bed  HENT: NCAT, mucous membranes moist  Respiratory: Clear to auscultation bilaterally, respiratory effort normal   Cardiovascular: Irregularly irregular rhythm  Gastrointestinal: Positive bowel sounds, soft, nontender, nondistended  : Del Cid catheter in place with straw colored urine in bag  Musculoskeletal: Trace bilateral edema   Psychiatric: Appropriate affect, cooperative with exam  Neurologic: Oriented x 3, moves all extremities spontaneously without focal deficits, speech clear    Pertinent  and/or Most Recent Results     LAB RESULTS:      Lab 12/17/24  0601 12/12/24  0410   WBC 5.63 5.97   HEMOGLOBIN 11.9* 11.8*   HEMATOCRIT 38.1 37.0*   PLATELETS 176 174   NEUTROS ABS  --  3.60   IMMATURE GRANS (ABS)  --  0.02   LYMPHS ABS  --  1.35   MONOS ABS  --  0.45   EOS ABS  --  0.48*   MCV 98.4* 96.9   SED RATE  --  85*   CRP  --  2.65*         Lab 12/17/24  1253 12/12/24  0410   SODIUM 137 138   POTASSIUM 4.0 4.1   CHLORIDE 103 105   CO2 27.0 26.0   ANION GAP 7.0 7.0   BUN 19 16   CREATININE 0.66* 0.64*   EGFR 90.2 91.1   GLUCOSE 157* 97   CALCIUM 8.8 8.6    MAGNESIUM  --  1.9         Lab 12/12/24  0410   TOTAL PROTEIN 6.1   ALBUMIN 2.5*   GLOBULIN 3.6   ALT (SGPT) 31   AST (SGOT) 57*   BILIRUBIN 0.4   ALK PHOS 172*     Brief Urine Lab Results  (Last result in the past 365 days)        Color   Clarity   Blood   Leuk Est   Nitrite   Protein   CREAT   Urine HCG        12/05/24 1432 Yellow   Clear   Negative   Moderate (2+)   Positive   Negative                 Microbiology Results (last 10 days)       ** No results found for the last 240 hours. **          CT Chest With Contrast Diagnostic    Result Date: 12/9/2024  CT CHEST W CONTRAST DIAGNOSTIC Date of Exam: 12/9/2024 1:56 PM EST Indication: f/u lung nodule. Comparison: Chest radiograph from December 5, 2024 and CT chest from September 4, 2024 Technique: Axial CT images were obtained of the chest after the uneventful intravenous administration of 85 mL Isovue-300.  Reconstructed coronal and sagittal images were also obtained. Automated exposure control and iterative construction methods were used. Findings: There is debris within the left mainstem bronchus. There is a moderate left pleural effusion with left basilar atelectasis obscures the region of the previously identified left lower lobe nodule. There is a small right pleural effusion with right basilar  atelectasis. No new pulmonary nodule is identified. The heart size appears normal. The great vessels are normal in caliber. No abnormally enlarged lymph nodes are identified. Partial evaluation of the upper abdomen demonstrates left renal cyst. No aggressive osseous lesions are identified.     Impression: 1.Moderate left pleural effusion with left basilar atelectasis obscures the region of the previously identified left lower lobe nodule. Recommend follow-up CT chest in 3 months. 2.Small right pleural effusion with right basilar atelectasis. Electronically Signed: Renny Good MD  12/9/2024 2:51 PM EST  Workstation ID: [id number]    XR Ankle 3+ View  Right    Result Date: 12/9/2024  XR ANKLE 3+ VW RIGHT Date of Exam: 12/9/2024 10:14 AM EST Indication: PAIN Comparison: None available. Findings: Acute enthesopathy and plantar calcaneal spurring. Vascular calcification. Moderate to severe degenerative changes of the midfoot. Sequela of old medial ankle injury. No asymmetric ankle mortise widening. Minimal lucency along the lateral talar dome. No evidence of acute fracture.     Impression: 1.No evidence of acute fracture or malalignment. 2.Minimal lucency along the lateral talar dome may represent a small osteochondral lesion. 3.Moderate to severe degenerative changes of the midfoot. Electronically Signed: Alexei Apodaca MD  12/9/2024 10:42 AM EST  Workstation ID: [id number]     Results for orders placed during the hospital encounter of 11/21/24    Adult Transesophageal Echo 3D (FARHAD) W/ Cont If Necessary Per Protocol    Interpretation Summary    There is a 27mm Watchman FLX device in place. It appears well seated and well compressed with no device related thrombus seen. There is a small jet of color flow, 2.5 mm, at the superior aspect adjacent to the left upper pulmonary vein. This was not seen previous examination however image quality is improved compared to prior.    Left ventricular systolic function is moderately decreased. Left ventricular ejection fraction appears to be 36 - 40%. Normal left ventricular wall thickness noted. The left ventricular cavity is dilated. There is left ventricular global hypokinesis noted.    : The right ventricular cavity is mildly dilated. Mildly reduced right ventricular systolic function noted.    : The left atrial cavity is severely dilated. No evidence of a left atrial thrombus present. There is light spontaneous echo contrast present in the atrial body.    The right atrial cavity is severely dilated.    There is mild, bileaflet mitral valve thickening present. Mild mitral valve regurgitation is present. No significant mitral  valve stenosis is present.    Mild tricuspid valve regurgitation is present. Estimated right ventricular systolic pressure from tricuspid regurgitation is mildly elevated (35-45 mmHg).    There is moderate pulmonic valve regurgitation present.    Discharge Details        Discharge Medications        New Medications        Instructions Start Date   colchicine 0.6 MG tablet   0.6 mg, Oral, Daily, x 5 days   Start Date: December 19, 2024     melatonin 5 MG tablet tablet   5 mg, Oral, Nightly PRN             Changes to Medications        Instructions Start Date   ascorbic acid 1000 MG tablet  Commonly known as: VITAMIN C  What changed: when to take this   1,000 mg, Oral, Daily      docusate sodium 100 MG capsule  Commonly known as: COLACE  What changed:   when to take this  reasons to take this   200 mg, Oral, Daily      furosemide 20 MG tablet  Commonly known as: LASIX  What changed:   medication strength  how much to take   20 mg, Oral, Daily      metFORMIN 1000 MG tablet  Commonly known as: GLUCOPHAGE  What changed:   how much to take  when to take this   500 mg, Oral, 2 Times Daily With Meals      metoprolol tartrate 50 MG tablet  Commonly known as: LOPRESSOR  What changed:   medication strength  how much to take  when to take this   50 mg, Oral, Every 12 Hours Scheduled      omeprazole 20 MG capsule  Commonly known as: priLOSEC  What changed:   how much to take  when to take this   40 mg, Oral, Every Morning Before Breakfast             Continue These Medications        Instructions Start Date   albuterol sulfate  (90 Base) MCG/ACT inhaler  Commonly known as: PROVENTIL HFA;VENTOLIN HFA;PROAIR HFA   2 puffs, Inhalation, Every 4 Hours PRN      allopurinol 300 MG tablet  Commonly known as: ZYLOPRIM   300 mg, Oral, Daily      Coenzyme Q10 300 MG capsule   1 capsule, Nightly      donepezil 10 MG tablet  Commonly known as: ARICEPT   10 mg, Oral, Nightly      finasteride 5 MG tablet  Commonly known as: PROSCAR   5  mg, Oral, Every Night at Bedtime      Iron 240 (27 Fe) MG tablet   1 tablet, Daily      memantine 10 MG tablet  Commonly known as: NAMENDA   10 mg, Oral, 2 Times Daily      methenamine 1 g tablet  Commonly known as: HIPREX   1 g, Oral, 2 Times Daily With Meals      multivitamin with minerals tablet tablet   1 tablet, Nightly      pravastatin 40 MG tablet  Commonly known as: PRAVACHOL   40 mg, Oral, Nightly      PROBIOTIC ADVANCED PO   1 tablet, 2 Times Daily      sertraline 50 MG tablet  Commonly known as: ZOLOFT   50 mg, Oral, Daily      tiotropium bromide-olodaterol 2.5-2.5 MCG/ACT aerosol solution inhaler  Commonly known as: STIOLTO RESPIMAT   2 puffs, Inhalation, Daily, 2 inh once a day             Stop These Medications      potassium chloride 10 MEQ CR tablet            Allergies   Allergen Reactions    Sglt2 Inhibitors Other (See Comments)     Recurrent UTI    Xarelto [Rivaroxaban] GI Bleeding     GI bleed    Penicillins Hives     Has tolerated cefepime, ceftriaxone, cefazolin, cefdinir, cefuroxime, cephalexin     Discharge Disposition:Skilled Nursing Facility (DC - External)    Diet:  Diet Instructions       Diet: Regular/House Diet, Cardiac Diets, Diabetic Diets; Healthy Heart (2-3 Na+); Regular (IDDSI 7); Thin (IDDSI 0); Consistent Carbohydrate      Discharge Diet:  Regular/House Diet  Cardiac Diets  Diabetic Diets       Cardiac Diet: Healthy Heart (2-3 Na+)    Texture: Regular (IDDSI 7)    Fluid Consistency: Thin (IDDSI 0)    Diabetic Diet: Consistent Carbohydrate         Activity:  Activity Instructions       Activity as Tolerated      Activity as Tolerated      Up WIth Assist            CODE STATUS:    Code Status and Medical Interventions: CPR (Attempt to Resuscitate); Full Support   Ordered at: 12/10/24 1050     Level Of Support Discussed With:    Patient     Code Status (Patient has no pulse and is not breathing):    CPR (Attempt to Resuscitate)     Medical Interventions (Patient has pulse or is  breathing):    Full Support     Future Appointments   Date Time Provider Department Center   12/20/2024 10:00 AM Kristy Simpson APRN [facility]   1/2/2025  1:45 PM Anthony Mcdaniel MD [facility]   1/3/2025  3:00 PM Rafa Lin APRN [facility] None   1/20/2025  1:20 PM Krystian Larkin MD [facility]   3/5/2025 11:15 AM Charmaine Marte APRN [facility]   3/21/2025 10:30 AM Evangelina Hines APRN [facility]   5/13/2025  3:00 PM Blanquita Ansari APRN [facility]     Additional Instructions for the Follow-ups that You Need to Schedule       Discharge Follow-up with PCP   As directed       Currently Documented PCP:    Pito Way MD    PCP Phone Number:    [phone]     Follow Up Details: Follow up with PCP in 1 week.        Discharge Follow-up with Specified Provider: Urology - [facility] or Charmaine Marte in 10-14 days   As directed      To: Urology - [facility] or [facility]e in 10-14 days              Lizet Mitchell PA-C  12/18/24    Time Spent on Discharge:  I spent 35 minutes on this discharge activity which included: face-to-face encounter with the patient, reviewing the data in the system, coordination of the care with the nursing staff as well as consultants, documentation, and entering orders.

## 2024-12-18 NOTE — CASE MANAGEMENT/SOCIAL WORK
Case Management Discharge Note      Final Note: Pt is discharging to SNF at [facility] today. CM confirmed with [facility] facility liaison, Pt can be accepted today. Facility will retrieve discharge summary from [facility]. RN to call report to [phone]. Any controlled meds will be escribed to [facility] (pharmacy changed in [facility]). Daughter, Nikki, will provide transportation via private vehicle. Pt and daughter are aware and agreeable to plan. No other discharge needs identified.         Selected Continued Care - Admitted Since 12/5/2024       Destination Coordination complete.      Service Provider Services Address Phone Fax Patient Pref[facility]    [facility] [address], [facility] [address] [phone] --              Durable Medical Equipment    No services have been selected for the patient.                Dialysis/Infusion    No services have been selected for the patient.                Home Medical Care    No services have been selected for the patient.                Therapy    No services have been selected for the patient.                Community Resources    No services have been selected for the patient.                Community & DME    No services have been selected for the patient.                    Transportation Services  Private: Car    Final Discharge Disposition Code: 03 - skilled nursing facility (SNF)

## 2024-12-18 NOTE — PLAN OF CARE
Goal Outcome Evaluation:  Plan of Care Reviewed With: patient        Progress: no change  Outcome Evaluation: Patient without compliants. No acute events overnight.

## 2024-12-18 NOTE — PAYOR COMM NOTE
"Darien Camarena \"[place]\" (89 y.o. Male)     [id number]     Lindsey Stout RN  Utilization Review  Eprvr-[phone]  [phone]        Date of Birth   1935    Social Security Number       Address   [address] [place] [id number]    Home Phone   [phone]    MRN   [id number]       Confucianism   Sikhism    Marital Status                               Admission Date   12/5/24    Admission Type   Emergency    Admitting Provider   Clarence Samaniego MD    Attending Provider   Clarence Samaniego MD    Department, Room/Bed   [facility] 6B, N631/1       Discharge Date       Discharge Disposition   Skilled Nursing Facility (DC - External)    Discharge Destination                                 Attending Provider: Clarence Samaniego MD    Allergies: Sglt2 Inhibitors, Xarelto [Rivaroxaban], Penicillins    Isolation: Contact   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), 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)   Code Status: CPR    Ht: 190.5 cm (75\")   Wt: 90.7 kg (200 lb)    Admission Cmt: None   Principal Problem: Orthostatic hypotension [I95.1]                   Active Insurance as of 12/5/2024       Primary Coverage       Payor Plan Insurance Group Employer/Plan Group    ANTHEM MEDICARE REPLACEMENT ANTHEM MED ADV HMO [id number]       Payor Plan Address Payor Plan Phone Number Payor Plan Fax Number Effective Dates    [address] [phone]  1/1/2024 - None Entered    [facility] [id number]         Subscriber Name Subscriber Birth Date Member ID       DARIEN CAMARENA 1935 [id number]                     Emergency Contacts        (Rel.) Home Phone Work Phone Mobile Phone    Nikki Ware (Daughter) [phone] -- [phone]    JackelynColumba (Daughter) [phone] -- [phone]    Dolly Taylor (Daughter) [phone] -- [phone]                 Discharge Summary        Vikram Klein APRN at 12/08/24 1321              [facility] Medicine " Services  DISCHARGE SUMMARY    Patient Name: Darien Camarena  : 1935  MRN: [id number]    Date of Admission: 2024 10:15 AM  Date of Discharge:  24    Primary Care Physician: Pito Way MD    Consults       Date and Time Order Name Status Description    2024  4:49 PM Inpatient Urology Consult Completed     2024  4:49 PM Inpatient Cardiology Consult Completed             Hospital Course     Presenting Problem: Orthostatic hypotension, urinary retention    Active Hospital Problems    Diagnosis  POA    **Orthostatic hypotension [I95.1]  Yes    Acute on chronic urinary retention [R33.9]  Yes    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 male with history of chronic atrial fibrillation s/p watchman, CKD stage III, chronic urinary retention with self caths, HFmr EF, type 2 diabetes, hypertension hyperlipidemia.  Patient sent from urology clinic with complaints of dizziness and orthostatic hypotension. UA was concerning for UTI. Urine culture grew E.Coli. She received IV Rocephin and will transition p.o. Ceftin to complete 5-day regimen.  Urology followed due to acute urinary retention and recommended to continue indwelling Del Cid catheter.  He was recommended to follow-up with Dr. Larkin in 2 weeks.  Cardiology followed due to A-fib with rapid ventricular response.  He was initially started on amiodarone which has since been discontinued.  He will continue on metoprolol 50 mg twice daily.  He was recommended to follow-up with cardiologist Dr. Mcdaniel in 4 to 6 weeks. PT/OT evaluated and recommended home with assist. The patient's presenting symptoms have improved and he  will be discharged home today in stable condition.      Acute UTI, POA  Chronic urine retention with severe left hydronephrosis, improved  Patient with known history of chronic retention.  He self cath 4 times a day.  Have been having difficulty self cathing likely secondary to developing urethral stricture  Seen at urology clinic and sent to the hospital because of hypotension  UA concerning for UTI.  Urine culture grew E.coli, susceptible to Ceftriaxone  s/p Rocephin x1 dose, continue PO Ceftin to complete 5 day regimen   Urology followed  Had a Del Cid catheter placed per urology recs.  Repeat CT abdomen after Del Cid catheter placement showed complete resolution of the severe left hydronephrosis.  Urologist Dr. Larkin recommended to discharge the patient on Del Cid catheter and follow-up with him in the clinic in 2 weeks     Orthostatic hypotension, resolved  Likely secondary to poor oral intake over the last few days   s/p IV fluids  Continue to hold Lasix, KCL     HFmrEF, compensated  Paroxysmal A-fib  Echo from 11/21 with EF 35-to 40%  Recently had increase in Lasix due to lower extremity edema.  Lasix currently on hold because of hypotension  Continue Metoprolol 50 mg BID   amiodarone d/c'd per cardiology   Not on anticoagulation s/p  Watchman device  Follow-up with cardiology in 4 to 6 weeks.     CKD stage III  Creatinine stable at baseline     Well-controlled type 2 diabetes   A1c 5.9%     Dementia and depression  Continue Aricept and Namenda  Continue Zoloft    Discharge Follow Up Recommendations for outpatient labs/diagnostics:  Follow-up with PCP in 1 week.  Follow-up with cardiologist Dr. Mcdaniel in 4 to 6 weeks.    Day of Discharge     HPI:   Patient was seen resting in bed no apparent distress.  No acute events overnight per nursing.  He is currently feeling well and feels ready to discharge home.  We discussed the importance of taking her medications as prescribed and keeping all recommended follow-up  appointments.  He expressed no additional concerns this time.    Vital Signs:   Temp:  [97.3 °F (36.3 °C)-98.2 °F (36.8 °C)] 97.3 °F (36.3 °C)  Heart Rate:  [108-129] 108  Resp:  [18-20] 18  BP: (110-129)/(76-96) 116/96  Flow (L/min) (Oxygen Therapy):  [2] 2    Physical Exam:  Constitutional: No acute distress, awake, alert, chronically ill-appearing  HENT: NCAT, mucous membranes moist  Respiratory: Grossly clear, diminished in bases, respiratory effort normal on 2 L  Cardiovascular: Irregularly irregular, no murmurs, cap refill brisk   Gastrointestinal: Positive bowel sounds, soft, nontender, nondistended  Musculoskeletal: No BLE edema   Psychiatric: Appropriate affect, cooperative  Neurologic: Oriented x 3, moves all extremities, speech clear  Skin: warm, dry, no visible rash       Pertinent  and/or Most Recent Results     LAB RESULTS:      Lab 12/08/24 0439 12/07/24  0430 12/05/24  1350 12/05/24  1034   WBC 6.83 6.15  --  9.69   HEMOGLOBIN 11.3* 11.3*  --  13.6   HEMATOCRIT 36.6* 35.3*  --  43.7   PLATELETS 154 149  --  252   NEUTROS ABS 4.60 4.15  --  7.39*   IMMATURE GRANS (ABS) 0.02 0.01  --  0.03   LYMPHS ABS 1.32 1.29  --  1.47   MONOS ABS 0.45 0.39  --  0.48   EOS ABS 0.39 0.26  --  0.25   MCV 99.2* 98.3*  --  100.0*   PROCALCITONIN  --   --   --  0.05   LACTATE  --   --  2.9* 3.0*         Lab 12/08/24  0439 12/07/24  0430 12/06/24  0835 12/05/24  1034   SODIUM 138 141 138 142   POTASSIUM 4.2 3.9 4.4 5.0   CHLORIDE 106 105 105 100   CO2 27.0 27.0 27.0 29.0   ANION GAP 5.0 9.0 6.0 13.0   BUN 21 21 31* 40*   CREATININE 0.68* 0.66* 0.94 1.19   EGFR 89.4 90.2 78.0 58.8*   GLUCOSE 124* 105* 119* 106*   CALCIUM 8.5* 8.6 8.6 9.7   MAGNESIUM  --  1.7  --  1.7   PHOSPHORUS  --  3.1  --   --          Lab 12/08/24  0439 12/07/24  0430 12/05/24  1034   TOTAL PROTEIN 5.7* 5.9* 7.7   ALBUMIN 2.7* 2.8* 3.5   GLOBULIN 3.0 3.1 4.2   ALT (SGPT) 15 12 18   AST (SGOT) 33 25 41*   BILIRUBIN 0.5 0.7 0.9   ALK PHOS 167* 166*  221*         Lab 12/05/24  1034   HSTROP T 36*                 Brief Urine Lab Results  (Last result in the past 365 days)        Color   Clarity   Blood   Leuk Est   Nitrite   Protein   CREAT   Urine HCG        12/05/24 1432 Yellow   Clear   Negative   Moderate (2+)   Positive   Negative                 Microbiology Results (last 10 days)       Procedure Component Value - Date/Time    Urine Culture - Urine, Urine, Clean Catch [[id number]]  (Abnormal)  (Susceptibility) Collected: 12/05/24 1610    Lab Status: Final result Specimen: Urine, Clean Catch Updated: 12/07/24 1028     Urine Culture >100,000 CFU/mL Escherichia coli    Narrative:      Colonization of the urinary tract without infection is common. Treatment is discouraged unless the patient is symptomatic, pregnant, or undergoing an invasive urologic procedure.    Susceptibility        Escherichia coli      JESSICA      Amikacin Susceptible      Amoxicillin + Clavulanate Susceptible      Ampicillin Resistant      Ampicillin + Sulbactam Intermediate      Cefazolin (Urine) Susceptible      Cefepime Susceptible      Ceftazidime Susceptible      Ceftriaxone Susceptible      Gentamicin Resistant      Levofloxacin Resistant      Nitrofurantoin Susceptible      Piperacillin + Tazobactam Susceptible      Tobramycin Intermediate      Trimethoprim + Sulfamethoxazole Resistant                           Blood Culture - Blood, Hand, Right [[id number]]  (Normal) Collected: 12/05/24 1451    Lab Status: Preliminary result Specimen: Blood from Hand, Right Updated: 12/07/24 1515     Blood Culture No growth at 2 days    Blood Culture - Blood, Hand, Left [[id number]]  (Normal) Collected: 12/05/24 1451    Lab Status: Preliminary result Specimen: Blood from Hand, Left Updated: 12/07/24 1515     Blood Culture No growth at 2 days    Urine Culture - Urine, Urine, Catheter [[id number]]  (Abnormal) Collected: 12/05/24 1432    Lab Status: Final result Specimen: Urine, Catheter Updated: 12/07/24  1028     Urine Culture >100,000 CFU/mL Escherichia coli    Narrative:      Refer to previous urine culture collected on 12/05/2024 1610 for MICs    Colonization of the urinary tract without infection is common. Treatment is discouraged unless the patient is symptomatic, pregnant, or undergoing an invasive urologic procedure.            CT Abdomen Pelvis Without Contrast    Result Date: 12/6/2024  CT ABDOMEN PELVIS WO CONTRAST Date of Exam: 12/6/2024 10:43 AM EST Indication: Chronic urinary retention admitted with UTI, hx of severe left hydroureteronephrosis, reassess anatomy. Comparison: CT of the abdomen and pelvis dated 9//2024 Technique: Axial CT images were obtained of the abdomen and pelvis without the administration of contrast. Reconstructed coronal and sagittal images were also obtained. Automated exposure control and iterative construction methods were used. Findings: Liver: The liver is unremarkable in morphology. Evaluation for focal liver lesions is limited without IV contrast. No biliary dilation is seen. There appears to be trace pneumobilia within left hepatic ducts. Gallbladder: Unremarkable. Pancreas: Unremarkable. Spleen: Unremarkable. Adrenal glands: Unremarkable. Genitourinary tract: Several punctate nonobstructing calculi within the left kidney. Small left renal cysts. Left renal cortical thinning/scarring. Right kidney appears unremarkable. No hydronephrosis is seen. There is an indwelling Del Cid catheter. There  is air within the urinary bladder and left urinary tract. Right ureter is unremarkable. Prostate gland is unremarkable. Gastrointestinal tract: Limited evaluation of the hollow viscera due to lack of IV contrast administration. There is no evidence of bowel obstruction. Appendix: The appendix is not identified. Other findings: No free air or free fluid. No pathologically enlarged lymph nodes are seen. Vascular calcifications are present. Bones and soft tissues: No acute osseous lesion  is identified. Bones are demineralized. There are degenerative changes within the spine. Superficial soft tissues demonstrate no acute abnormality. Lung bases: Small bilateral pleural effusions, left greater than right. Bibasilar atelectasis.     Impression: 1.No acute abnormality identified within the abdomen or pelvis. 2.Indwelling Del Cid catheter and air noted within the urinary bladder and left urinary tract. Please correlate with urinalysis to exclude urinary tract infection. 3.Several punctate nonobstructing calculi within the left kidney. 4.Small bilateral pleural effusions with bibasilar atelectasis. 5.Additional findings as detailed above. Electronically Signed: Andrea Lopez MD  12/6/2024 12:23 PM EST  Workstation ID: [id number]    XR Chest 1 View    Result Date: 12/5/2024  XR CHEST 1 VW Date of Exam: 12/5/2024 11:07 AM EST Indication: Weak/Dizzy/AMS triage protocol Comparison: 11/6/2024 Findings: Left lower lobe infiltrate has improved with minimal infiltrate remaining. Heart size is normal. Right lung is clear. There are no effusions.     Impression: Improved left lower lobe infiltrate. No new abnormality. Electronically Signed: Aster Mathur MD  12/5/2024 11:36 AM EST  Workstation ID: [id number]             Results for orders placed during the hospital encounter of 11/21/24    Adult Transesophageal Echo 3D (FARHAD) W/ Cont If Necessary Per Protocol    Interpretation Summary    There is a 27mm Watchman FLX device in place. It appears well seated and well compressed with no device related thrombus seen. There is a small jet of color flow, 2.5 mm, at the superior aspect adjacent to the left upper pulmonary vein. This was not seen previous examination however image quality is improved compared to prior.    Left ventricular systolic function is moderately decreased. Left ventricular ejection fraction appears to be 36 - 40%. Normal left ventricular wall thickness noted. The left ventricular cavity is dilated.  There is left ventricular global hypokinesis noted.    : The right ventricular cavity is mildly dilated. Mildly reduced right ventricular systolic function noted.    : The left atrial cavity is severely dilated. No evidence of a left atrial thrombus present. There is light spontaneous echo contrast present in the atrial body.    The right atrial cavity is severely dilated.    There is mild, bileaflet mitral valve thickening present. Mild mitral valve regurgitation is present. No significant mitral valve stenosis is present.    Mild tricuspid valve regurgitation is present. Estimated right ventricular systolic pressure from tricuspid regurgitation is mildly elevated (35-45 mmHg).    There is moderate pulmonic valve regurgitation present.      Plan for Follow-up of Pending Labs/Results: Follow up as directed.   Pending Labs       Order Current Status    Blood Culture - Blood, Hand, Left Preliminary result    Blood Culture - Blood, Hand, Right Preliminary result          Discharge Details        Discharge Medications        New Medications        Instructions Start Date   aspirin 81 MG EC tablet   81 mg, Oral, Daily   Start Date: December 9, 2024     cefuroxime 500 MG tablet  Commonly known as: CEFTIN   500 mg, Oral, 2 Times Daily   Start Date: December 9, 2024            Changes to Medications        Instructions Start Date   metoprolol tartrate 50 MG tablet  Commonly known as: LOPRESSOR  What changed:   medication strength  how much to take  when to take this   50 mg, Oral, Every 12 Hours Scheduled      pravastatin 40 MG tablet  Commonly known as: PRAVACHOL  What changed: when to take this   40 mg, Oral, Daily             Continue These Medications        Instructions Start Date   albuterol sulfate  (90 Base) MCG/ACT inhaler  Commonly known as: PROVENTIL HFA;VENTOLIN HFA;PROAIR HFA   2 puffs, Inhalation, Every 4 Hours PRN      allopurinol 300 MG tablet  Commonly known as: ZYLOPRIM   300 mg, Oral, Daily       ascorbic acid 1000 MG tablet  Commonly known as: VITAMIN C   1,000 mg, 2 Times Daily      Coenzyme Q10 300 MG capsule   1 capsule, Nightly      docusate sodium 100 MG capsule  Commonly known as: COLACE   200 mg, Nightly PRN      donepezil 10 MG tablet  Commonly known as: ARICEPT   10 mg, Oral, Nightly      finasteride 5 MG tablet  Commonly known as: PROSCAR   5 mg, Oral, Every Night at Bedtime      Iron 240 (27 Fe) MG tablet   1 tablet, Daily      memantine 10 MG tablet  Commonly known as: NAMENDA   10 mg, Oral, 2 Times Daily      metFORMIN 1000 MG tablet  Commonly known as: GLUCOPHAGE   1,000 mg, Oral, 2 Times Daily      methenamine 1 g tablet  Commonly known as: HIPREX   1 g, Oral, 2 Times Daily With Meals      multivitamin with minerals tablet tablet   1 tablet, Nightly      omeprazole 20 MG capsule  Commonly known as: priLOSEC   20 mg, Oral, 2 Times Daily      PROBIOTIC ADVANCED PO   1 tablet, 2 Times Daily      sertraline 50 MG tablet  Commonly known as: ZOLOFT   50 mg, Oral, Daily      tiotropium bromide-olodaterol 2.5-2.5 MCG/ACT aerosol solution inhaler  Commonly known as: STIOLTO RESPIMAT   2 puffs, Inhalation, Daily, 2 inh once a day             Stop These Medications      furosemide 40 MG tablet  Commonly known as: LASIX     potassium chloride 10 MEQ CR tablet              Allergies   Allergen Reactions    Sglt2 Inhibitors Other (See Comments)     Recurrent UTI    Xarelto [Rivaroxaban] GI Bleeding     GI bleed    Penicillins Hives     Has tolerated cefepime, ceftriaxone, cefazolin, cefdinir, cefuroxime, cephalexin         Discharge Disposition: Home   Home or Self Care    Diet:  Hospital:  Diet Order   Procedures    Diet: Cardiac; Healthy Heart (2-3 Na+); Fluid Consistency: Thin (IDDSI 0)       Diet Instructions       Diet: Regular/House Diet, Cardiac Diets, Diabetic Diets; Healthy Heart (2-3 Na+); Regular (IDDSI 7); Thin (IDDSI 0); Consistent Carbohydrate      Discharge Diet:  Regular/House  Diet  Cardiac Diets  Diabetic Diets       Cardiac Diet: Healthy Heart (2-3 Na+)    Texture: Regular (IDDSI 7)    Fluid Consistency: Thin (IDDSI 0)    Diabetic Diet: Consistent Carbohydrate             Activity: As tolerated  Activity Instructions       Activity as Tolerated                 CODE STATUS:    There are no questions and answers to display.       Future Appointments   Date Time Provider Department Center   2024  1:15 PM [facility] CT 1 BH [facility] CT SO [facility]   2024  2:00 PM Rafa Lin APRN [facility] None   2024  2:00 PM Pito Way MD [facility]   2025  1:45 PM Anthony Mcdaniel MD [facility]   3/5/2025 11:15 AM Charmaine Marte APRN [facility]   3/21/2025 10:30 AM Evangelina Hines APRN [facility]   2025  3:00 PM Blanquita Ansari APRN [facility]Y M[facility]Y       Additional Instructions for the Follow-ups that You Need to Schedule       Discharge Follow-up with PCP   As directed       Currently Documented PCP:    Pito Way MD    PCP Phone Number:    [phone]     Follow Up Details: Follow up with PCP in 1 week.        Discharge Follow-up with Specified Provider: Follow up with cardiologist Dr. Mcdaniel in 4-6 weeks.   As directed      To: Follow up with cardiologist Dr. Mcdaniel in 4-6 weeks.                      BERYL Hillman  24      Time Spent on Discharge:  I spent  41  minutes on this discharge activity which included: face-to-face encounter with the patient, reviewing the data in the system, coordination of the care with the nursing staff as well as consultants, documentation, and entering orders.          Electronically signed by Vikram Klein APRN at 24 1350       Lizet Mitchell PA-C at 24 0826                  [facility] Medicine Services  DISCHARGE SUMMARY    Patient Name: Darien Camarena  : 1935  MRN: [id number]    Date of Admission: 2024 10:15 AM  Date of  Discharge:  12/18/2024  Primary Care Physician: Pito Way MD    Consults       Date and Time Order Name Status Description    12/5/2024  4:49 PM Inpatient Urology Consult Completed     12/5/2024  4:49 PM Inpatient Cardiology Consult Completed           Hospital Course     Active Hospital Problems    Diagnosis  POA    Hydroureteronephrosis [N13.30]  Yes    Urethral stricture [N35.919]  Yes    Acute on chronic urinary retention [R33.9]  Yes    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]  Yes    Presence of Watchman left atrial appendage closure device [Z95.818]  Yes    Coronary artery disease involving native coronary artery of native heart without angina pectoris [I25.10]  Yes    Stage 3 chronic kidney disease [N18.30]  Yes    Permanent atrial fibrillation [I48.21]  Yes    Essential hypertension [I10]  Yes    Obstructive sleep apnea syndrome [G47.33]  Yes    Type 2 diabetes mellitus with stage 3 chronic kidney disease, without long-term current use of insulin [E11.22, N18.30]  Yes      Resolved Hospital Problems    Diagnosis Date Resolved POA    **Orthostatic hypotension [I95.1] 12/18/2024 Yes    A-fib [I48.91] 12/07/2024 Yes      Hospital Course:  Darien Camarena is an 89yoM with PMH significant for HTN, HLD, chronic HFrEF, chronic atrial fibrillation s/p watchman, DMII, CKD stage III and chronic urinary retention (self caths). Presented to urology office on 12/5 due to recent difficulty passing catheters through urethra. While in office, he also complained of dizziness x 1 week and was noted to be hypotensive with BP 92/60. He was directed to go to the ED for further evaluation. In ED, found to have orthostatic hypotension, UTI and severe L hydronephrosis.      Acute E. Coli UTI, POA   Chronic urinary retention with severe left hydronephrosis, improved  - Urology followed - appreciate assistance   - Recent difficulty self cathing likely secondary to developing urethral  stricture  - Del Cid catheter placed on 12/5. Urology recommends to discharge with Del Cid catheter in place and follow up outpatient in 2 weeks   - UA concerning for UTI.   - Urine culture grew pan-susecptible E. Coli. He has completed a course of antibiotics.   - Urine culture with pansensitive E. coli. Has completed a course of antibiotics   - Resume home Methenamine at [place]      Orthostatic hypotension, resolved  - Likely secondary to poor oral intake and increased Lasix dose   - s/p IV fluids with improvement.   - PO intake improving     HFrEF, compensated  Chronic atrial atrial fibrillation  - Echo from 11/21/24 with EF 35-to 40%  - Cardiology followed this admission   - Not anticoagulated - s/p Watchman device placement. Continue ASA   - Recently had a recent increase in Lasix due to lower extremity edema  - Lasix on hold during most of this admission - patient has remained euvolemic with minimal lower extremity edema. Will DC on previous dose of Lasix - 20mg PO daily  - Continue Metoprolol and Amiodarone    LLL lung nodule   - 4/5/24 CT Chest showed stable 1.5cm LLL lung nodule with increased attenuation, similar to prior studies  - CT chest on 12/9 showed moderate L pleural efffusion and bibasilar atelectasis that obscured the region of the previously identified LLL lung nodule.   - Needs repeat CT chest in ~3 months   - Will refer to lung nodule clinic at [place]      Right foot pain secondary to gout flare, improved  - CRP and sedimentation rate are elevated  - X-ray with advanced arthritis but no other acute findings or fractures  - Pain improved with colchicine. Plan to continue treatment for 2 weeks, through 12/23/24  - Resume Allopurinol at [place]      CKD stage III  - Creatinine stable / at baseline     Well-controlled type 2 diabetes   - A1c 5.9%  - Continue Metformin 500mg BID at [place]      Dementia and depression  - Continue Aricept and Namenda  - Continue Zoloft     Chronic weakness  - PT/OT followed - to [facility]  for rehab today     Day of Discharge     HPI:   Resting in bed. Woke up early around 0400 and couldn't fall back asleep. Otherwise, no new complaints. Denies chest pain or dyspnea. No abdominal pain, nausea or vomiting. Bowels moving. Does not feel that his lower extremities are edematous     Review of Systems  Gen- No fevers, chills  CV- No chest pain, palpitations  Resp- No cough, dyspnea  GI- No N/V/D, abd pain    Vital Signs:   Temp:  [97.4 °F (36.3 °C)-98.3 °F (36.8 °C)] 97.4 °F (36.3 °C)  Heart Rate:  [] 111  Resp:  [16-18] 18  BP: (120-145)/() 137/103    Physical Exam:  Constitutional: No acute distress, awake, alert and conversant. Sitting upright in bed  HENT: NCAT, mucous membranes moist  Respiratory: Clear to auscultation bilaterally, respiratory effort normal   Cardiovascular: Irregularly irregular rhythm  Gastrointestinal: Positive bowel sounds, soft, nontender, nondistended  : Del Cid catheter in place with straw colored urine in bag  Musculoskeletal: Trace bilateral edema   Psychiatric: Appropriate affect, cooperative with exam  Neurologic: Oriented x 3, moves all extremities spontaneously without focal deficits, speech clear    Pertinent  and/or Most Recent Results     LAB RESULTS:      Lab 12/17/24  0601 12/12/24  0410   WBC 5.63 5.97   HEMOGLOBIN 11.9* 11.8*   HEMATOCRIT 38.1 37.0*   PLATELETS 176 174   NEUTROS ABS  --  3.60   IMMATURE GRANS (ABS)  --  0.02   LYMPHS ABS  --  1.35   MONOS ABS  --  0.45   EOS ABS  --  0.48*   MCV 98.4* 96.9   SED RATE  --  85*   CRP  --  2.65*         Lab 12/17/24  1253 12/12/24  0410   SODIUM 137 138   POTASSIUM 4.0 4.1   CHLORIDE 103 105   CO2 27.0 26.0   ANION GAP 7.0 7.0   BUN 19 16   CREATININE 0.66* 0.64*   EGFR 90.2 91.1   GLUCOSE 157* 97   CALCIUM 8.8 8.6   MAGNESIUM  --  1.9         Lab 12/12/24  0410   TOTAL PROTEIN 6.1   ALBUMIN 2.5*   GLOBULIN 3.6   ALT (SGPT) 31   AST (SGOT) 57*   BILIRUBIN 0.4   ALK PHOS 172*     Brief Urine Lab Results   (Last result in the past 365 days)        Color   Clarity   Blood   Leuk Est   Nitrite   Protein   CREAT   Urine HCG        12/05/24 1432 Yellow   Clear   Negative   Moderate (2+)   Positive   Negative                 Microbiology Results (last 10 days)       ** No results found for the last 240 hours. **          CT Chest With Contrast Diagnostic    Result Date: 12/9/2024  CT CHEST W CONTRAST DIAGNOSTIC Date of Exam: 12/9/2024 1:56 PM EST Indication: f/u lung nodule. Comparison: Chest radiograph from December 5, 2024 and CT chest from September 4, 2024 Technique: Axial CT images were obtained of the chest after the uneventful intravenous administration of 85 mL Isovue-300.  Reconstructed coronal and sagittal images were also obtained. Automated exposure control and iterative construction methods were used. Findings: There is debris within the left mainstem bronchus. There is a moderate left pleural effusion with left basilar atelectasis obscures the region of the previously identified left lower lobe nodule. There is a small right pleural effusion with right basilar  atelectasis. No new pulmonary nodule is identified. The heart size appears normal. The great vessels are normal in caliber. No abnormally enlarged lymph nodes are identified. Partial evaluation of the upper abdomen demonstrates left renal cyst. No aggressive osseous lesions are identified.     Impression: 1.Moderate left pleural effusion with left basilar atelectasis obscures the region of the previously identified left lower lobe nodule. Recommend follow-up CT chest in 3 months. 2.Small right pleural effusion with right basilar atelectasis. Electronically Signed: Renny Good MD  12/9/2024 2:51 PM EST  Workstation ID: [id number]    XR Ankle 3+ View Right    Result Date: 12/9/2024  XR ANKLE 3+ VW RIGHT Date of Exam: 12/9/2024 10:14 AM EST Indication: PAIN Comparison: None available. Findings: Acute enthesopathy and plantar calcaneal spurring.  Vascular calcification. Moderate to severe degenerative changes of the midfoot. Sequela of old medial ankle injury. No asymmetric ankle mortise widening. Minimal lucency along the lateral talar dome. No evidence of acute fracture.     Impression: 1.No evidence of acute fracture or malalignment. 2.Minimal lucency along the lateral talar dome may represent a small osteochondral lesion. 3.Moderate to severe degenerative changes of the midfoot. Electronically Signed: Alexei Apodaca MD  12/9/2024 10:42 AM EST  Workstation ID: [id number]     Results for orders placed during the hospital encounter of 11/21/24    Adult Transesophageal Echo 3D (FARHAD) W/ Cont If Necessary Per Protocol    Interpretation Summary    There is a 27mm Watchman FLX device in place. It appears well seated and well compressed with no device related thrombus seen. There is a small jet of color flow, 2.5 mm, at the superior aspect adjacent to the left upper pulmonary vein. This was not seen previous examination however image quality is improved compared to prior.    Left ventricular systolic function is moderately decreased. Left ventricular ejection fraction appears to be 36 - 40%. Normal left ventricular wall thickness noted. The left ventricular cavity is dilated. There is left ventricular global hypokinesis noted.    : The right ventricular cavity is mildly dilated. Mildly reduced right ventricular systolic function noted.    : The left atrial cavity is severely dilated. No evidence of a left atrial thrombus present. There is light spontaneous echo contrast present in the atrial body.    The right atrial cavity is severely dilated.    There is mild, bileaflet mitral valve thickening present. Mild mitral valve regurgitation is present. No significant mitral valve stenosis is present.    Mild tricuspid valve regurgitation is present. Estimated right ventricular systolic pressure from tricuspid regurgitation is mildly elevated (35-45 mmHg).    There is  moderate pulmonic valve regurgitation present.    Discharge Details        Discharge Medications        New Medications        Instructions Start Date   colchicine 0.6 MG tablet   0.6 mg, Oral, Daily, x 5 days   Start Date: December 19, 2024     melatonin 5 MG tablet tablet   5 mg, Oral, Nightly PRN             Changes to Medications        Instructions Start Date   ascorbic acid 1000 MG tablet  Commonly known as: VITAMIN C  What changed: when to take this   1,000 mg, Oral, Daily      docusate sodium 100 MG capsule  Commonly known as: COLACE  What changed:   when to take this  reasons to take this   200 mg, Oral, Daily      furosemide 20 MG tablet  Commonly known as: LASIX  What changed:   medication strength  how much to take   20 mg, Oral, Daily      metFORMIN 1000 MG tablet  Commonly known as: GLUCOPHAGE  What changed:   how much to take  when to take this   500 mg, Oral, 2 Times Daily With Meals      metoprolol tartrate 50 MG tablet  Commonly known as: LOPRESSOR  What changed:   medication strength  how much to take  when to take this   50 mg, Oral, Every 12 Hours Scheduled      omeprazole 20 MG capsule  Commonly known as: priLOSEC  What changed:   how much to take  when to take this   40 mg, Oral, Every Morning Before Breakfast             Continue These Medications        Instructions Start Date   albuterol sulfate  (90 Base) MCG/ACT inhaler  Commonly known as: PROVENTIL HFA;VENTOLIN HFA;PROAIR HFA   2 puffs, Inhalation, Every 4 Hours PRN      allopurinol 300 MG tablet  Commonly known as: ZYLOPRIM   300 mg, Oral, Daily      Coenzyme Q10 300 MG capsule   1 capsule, Nightly      donepezil 10 MG tablet  Commonly known as: ARICEPT   10 mg, Oral, Nightly      finasteride 5 MG tablet  Commonly known as: PROSCAR   5 mg, Oral, Every Night at Bedtime      Iron 240 (27 Fe) MG tablet   1 tablet, Daily      memantine 10 MG tablet  Commonly known as: NAMENDA   10 mg, Oral, 2 Times Daily      methenamine 1 g  tablet  Commonly known as: HIPREX   1 g, Oral, 2 Times Daily With Meals      multivitamin with minerals tablet tablet   1 tablet, Nightly      pravastatin 40 MG tablet  Commonly known as: PRAVACHOL   40 mg, Oral, Nightly      PROBIOTIC ADVANCED PO   1 tablet, 2 Times Daily      sertraline 50 MG tablet  Commonly known as: ZOLOFT   50 mg, Oral, Daily      tiotropium bromide-olodaterol 2.5-2.5 MCG/ACT aerosol solution inhaler  Commonly known as: STIOLTO RESPIMAT   2 puffs, Inhalation, Daily, 2 inh once a day             Stop These Medications      potassium chloride 10 MEQ CR tablet            Allergies   Allergen Reactions    Sglt2 Inhibitors Other (See Comments)     Recurrent UTI    Xarelto [Rivaroxaban] GI Bleeding     GI bleed    Penicillins Hives     Has tolerated cefepime, ceftriaxone, cefazolin, cefdinir, cefuroxime, cephalexin     Discharge Disposition:Skilled Nursing Facility (DC - External)    Diet:  Diet Instructions       Diet: Regular/House Diet, Cardiac Diets, Diabetic Diets; Healthy Heart (2-3 Na+); Regular (IDDSI 7); Thin (IDDSI 0); Consistent Carbohydrate      Discharge Diet:  Regular/House Diet  Cardiac Diets  Diabetic Diets       Cardiac Diet: Healthy Heart (2-3 Na+)    Texture: Regular (IDDSI 7)    Fluid Consistency: Thin (IDDSI 0)    Diabetic Diet: Consistent Carbohydrate         Activity:  Activity Instructions       Activity as Tolerated      Activity as Tolerated      Up WIth Assist            CODE STATUS:    Code Status and Medical Interventions: CPR (Attempt to Resuscitate); Full Support   Ordered at: 12/10/24 1050     Level Of Support Discussed With:    Patient     Code Status (Patient has no pulse and is not breathing):    CPR (Attempt to Resuscitate)     Medical Interventions (Patient has pulse or is breathing):    Full Support     Future Appointments   Date Time Provider Department Center   12/20/2024 10:00 AM Kristy Simpson APRN [facility]   1/2/2025  1:45 PM Anthony Mcdaniel MD [facility]   1/3/2025  3:00 PM Rafa Lin, APR[facility]EL[facility] None   1/20/2025  1:20 PM Krystian Larkin MD [facility]   3/5/2025 11:15 AM Charmaine Marte APRN [facility]   3/21/2025 10:30 AM Evangelina Hines, BERYL [facility]   5/13/2025  3:00 PM Blanquita Ansari APRN [facility]     Additional Instructions for the Follow-ups that You Need to Schedule       Discharge Follow-up with PCP   As directed       Currently Documented PCP:    Pito Way MD    PCP Phone Number:    [phone]     Follow Up Details: Follow up with PCP in 1 week.        Discharge Follow-up with Specified Provider: Urology - Trae or Charmaine Marte in 10-14 days   As directed      To: Urology - Trae or Charmaine Marte in 10-14 days              Lizet Mitchell PA-C  12/18/24    Time Spent on Discharge:  I spent 35 minutes on this discharge activity which included: face-to-face encounter with the patient, reviewing the data in the system, coordination of the care with the nursing staff as well as consultants, documentation, and entering orders.            Electronically signed by Lizet Mitchell PA-C at 12/18/24 1654

## 2024-12-27 ENCOUNTER — TELEPHONE (OUTPATIENT)
Dept: CARDIOLOGY | Facility: CLINIC | Age: 89
End: 2024-12-27
Payer: MEDICARE

## 2024-12-27 NOTE — TELEPHONE ENCOUNTER
Violetta from [facility] called and stated that patient d/c list from [facility] did not have Amiodarone or ASA, but the d/c summary states he was to continue taking these. His BP has been running 150s/90s and HR 110s. He was d/c here on 12/18/24. They would like clarification if he needs to be restarted back on these medications.

## 2024-12-30 ENCOUNTER — OFFICE VISIT (OUTPATIENT)
Age: 89
End: 2024-12-30
Payer: MEDICARE

## 2024-12-30 DIAGNOSIS — R33.9 URINARY RETENTION: Primary | ICD-10-CM

## 2024-12-30 DIAGNOSIS — N13.30 HYDRONEPHROSIS OF LEFT KIDNEY: ICD-10-CM

## 2024-12-30 DIAGNOSIS — N13.731: ICD-10-CM

## 2024-12-30 RX ORDER — ASPIRIN 81 MG/1
81 TABLET ORAL DAILY
COMMUNITY

## 2024-12-30 RX ORDER — AMIODARONE HYDROCHLORIDE 200 MG/1
200 TABLET ORAL DAILY
COMMUNITY
End: 2024-12-31 | Stop reason: SDUPTHER

## 2024-12-30 RX ORDER — ONDANSETRON 4 MG/1
4 TABLET, FILM COATED ORAL EVERY 6 HOURS PRN
COMMUNITY

## 2024-12-30 NOTE — PROGRESS NOTES
Follow Up Office Visit      Patient Name: Darien Camarena  : 1935   MRN: [id number]     Chief Complaint:    Chief Complaint   Patient presents with    Urinary Retention       Referring Provider: No ref. provider found    History of Present Illness: Darien Camarena is a 89 y.o. male who presents today for follow up of recurrent UTI, chronic left hydronephrosis, left renal atrophy, chronically distended/dysfunctional neurogenic bladder.  He was seen as an inpatient consultation in early December, admitted from our nurse practitioners clinic with signs of sepsis.  Was found to have a urinary tract infection, a catheter was placed for maximal urinary decompression.  Prior to catheter placement he was managing his incomplete bladder emptying and urinary retention with 3X daily self-catheterization.  Previous CT scan suggested severe left-sided hydronephrosis, hydroureteronephrosis proximally and likely severe vesicoureteral reflux on the left causing left renal atrophy.  This placed him at risk of infection.  Despite self-catheterization he still continued to have systemic symptoms and infection concerns.  Since catheter placement repeat CT scan was performed during his inpatient admission demonstrating resolution of hydronephrosis and hydroureter.  They with his daughter.    Catheter exchange today by nursing.  Home health for monthly exchange moving forward      Subjective      Review of System: Review of Systems   Genitourinary:  Positive for penile pain.      I have reviewed the ROS documented by my clinical staff, I have updated appropriately and I agree. Krystian Larkin MD    I have reviewed and the following portions of the patient's history were updated as appropriate: past family history, past medical history, past social history, past surgical history and problem list.    Medications:     Current Outpatient Medications:     albuterol sulfate  (90 Base) MCG/ACT inhaler, Inhale 2  puffs Every 4 (Four) Hours As Needed for Wheezing., Disp: 54 g, Rfl: 1    allopurinol (ZYLOPRIM) 300 MG tablet, Take 1 tablet by mouth Daily., Disp: 90 tablet, Rfl: 1    ascorbic acid (VITAMIN C) 1000 MG tablet, Take 1 tablet by mouth Daily., Disp: , Rfl:     aspirin 81 MG EC tablet, Take 1 tablet by mouth Daily., Disp: , Rfl:     Coenzyme Q10 300 MG capsule, Take 1 capsule by mouth Every Night., Disp: , Rfl:     colchicine 0.6 MG tablet, Take 1 tablet by mouth Daily. x 5 days, Disp: , Rfl:     docusate sodium (COLACE) 100 MG capsule, Take 2 capsules by mouth Daily., Disp: , Rfl:     donepezil (ARICEPT) 10 MG tablet, Take 1 tablet by mouth Every Night., Disp: 90 tablet, Rfl: 1    Ferrous Gluconate (IRON) 240 (27 FE) MG tablet, Take 1 tablet by mouth Daily., Disp: , Rfl:     finasteride (PROSCAR) 5 MG tablet, Take 1 tablet by mouth every night at bedtime., Disp: 90 tablet, Rfl: 1    furosemide (LASIX) 20 MG tablet, Take 1 tablet by mouth Daily., Disp: , Rfl:     melatonin 5 MG tablet tablet, Take 1 tablet by mouth At Night As Needed (insomnia / sleep)., Disp: , Rfl:     memantine (NAMENDA) 10 MG tablet, Take 1 tablet by mouth 2 (Two) Times a Day., Disp: 180 tablet, Rfl: 1    metFORMIN (GLUCOPHAGE) 1000 MG tablet, Take 0.5 tablets by mouth 2 (Two) Times a Day With Meals., Disp: , Rfl:     methenamine (HIPREX) 1 g tablet, Take 1 tablet by mouth 2 (Two) Times a Day With Meals., Disp: 180 tablet, Rfl: 1    metoprolol tartrate (LOPRESSOR) 50 MG tablet, Take 1 tablet by mouth Every 12 (Twelve) Hours., Disp: 120 tablet, Rfl: 5    multivitamin with minerals (MULTIVITAMIN MEN 50+ PO), Take 1 tablet by mouth Every Night. Centrum Sliver, Disp: , Rfl:     omeprazole (priLOSEC) 20 MG capsule, Take 2 capsules by mouth Every Morning Before Breakfast., Disp: , Rfl:     ondansetron (ZOFRAN) 4 MG tablet, Take 1 tablet by mouth Every 6 (Six) Hours As Needed for Nausea or Vomiting., Disp: , Rfl:     pravastatin (PRAVACHOL) 40 MG tablet,  Take 1 tablet by mouth Every Night., Disp: , Rfl:     Probiotic Product (PROBIOTIC ADVANCED PO), Take 1 tablet by mouth 2 (Two) Times a Day., Disp: , Rfl:     sertraline (ZOLOFT) 50 MG tablet, Take 1 tablet by mouth Daily., Disp: 90 tablet, Rfl: 1    tiotropium bromide-olodaterol (STIOLTO RESPIMAT) 2.5-2.5 MCG/ACT aerosol solution inhaler, Inhale 2 puffs Daily. 2 inh once a day, Disp: 3 each, Rfl: 3    amiodarone (PACERONE) 200 MG tablet, Take 1 tablet by mouth Daily., Disp: 30 tablet, Rfl: 0    Allergies:   Allergies   Allergen Reactions    Sglt2 Inhibitors Other (See Comments)     Recurrent UTI    Xarelto [Rivaroxaban] GI Bleeding     GI bleed    Penicillins Hives     Has tolerated cefepime, ceftriaxone, cefazolin, cefdinir, cefuroxime, cephalexin         Objective     Physical Exam:   Vital Signs: There were no vitals filed for this visit.  There is no height or weight on file to calculate BMI.     Physical Exam  Constitutional:       Comments: Frail elderly in wheelchair   HENT:      Head: Normocephalic and atraumatic.      Nose: Nose normal.   Eyes:      Extraocular Movements: Extraocular movements intact.      Conjunctiva/sclera: Conjunctivae normal.      Pupils: Pupils are equal, round, and reactive to light.   Genitourinary:     Comments: Del Cid in place draining clear yellow urine leg bag, no urethral erosion identified, normal testicles  Musculoskeletal:         General: Normal range of motion.      Cervical back: Normal range of motion and neck supple.   Skin:     General: Skin is warm and dry.      Findings: No lesion or rash.   Neurological:      General: No focal deficit present.      Mental Status: He is alert and oriented to person, place, and time. Mental status is at baseline.   Psychiatric:         Mood and Affect: Mood normal.         Behavior: Behavior normal.         Labs:   Brief Urine Lab Results  (Last result in the past 365 days)        Color   Clarity   Blood   Leuk Est   Nitrite   Protein    CREAT   Urine HCG        12/05/24 1432 Yellow   Clear   Negative   Moderate (2+)   Positive   Negative                   Urine Culture          5/7/2024    15:07 9/4/2024    21:48 12/5/2024    14:32 12/5/2024    16:10   Urine Culture   Urine Culture 50,000 CFU/mL Mixed Kareen Isolated  >100,000 CFU/mL Enterobacter cloacae complex  >100,000 CFU/mL Escherichia coli  >100,000 CFU/mL Escherichia coli         Lab Results   Component Value Date    GLUCOSE 157 (H) 12/17/2024    CALCIUM 8.8 12/17/2024     12/17/2024    K 4.0 12/17/2024    CO2 27.0 12/17/2024     12/17/2024    BUN 19 12/17/2024    CREATININE 0.66 (L) 12/17/2024    EGFRIFNONA 79 12/16/2021    BCR 28.8 (H) 12/17/2024    ANIONGAP 7.0 12/17/2024       Lab Results   Component Value Date    WBC 5.63 12/17/2024    HGB 11.9 (L) 12/17/2024    HCT 38.1 12/17/2024    MCV 98.4 (H) 12/17/2024     12/17/2024       Images:   CT Chest With Contrast Diagnostic    Result Date: 12/9/2024  Impression: 1.Moderate left pleural effusion with left basilar atelectasis obscures the region of the previously identified left lower lobe nodule. Recommend follow-up CT chest in 3 months. 2.Small right pleural effusion with right basilar atelectasis. Electronically Signed: Renny Good MD  12/9/2024 2:51 PM EST  Workstation ID: [id number]    XR Ankle 3+ View Right    Result Date: 12/9/2024  Impression: 1.No evidence of acute fracture or malalignment. 2.Minimal lucency along the lateral talar dome may represent a small osteochondral lesion. 3.Moderate to severe degenerative changes of the midfoot. Electronically Signed: Alexei Apodaca MD  12/9/2024 10:42 AM EST  Workstation ID: [id number]    CT Abdomen Pelvis Without Contrast    Result Date: 12/6/2024  Impression: 1.No acute abnormality identified within the abdomen or pelvis. 2.Indwelling Del Cid catheter and air noted within the urinary bladder and left urinary tract. Please correlate with urinalysis to exclude urinary  tract infection. 3.Several punctate nonobstructing calculi within the left kidney. 4.Small bilateral pleural effusions with bibasilar atelectasis. 5.Additional findings as detailed above. Electronically Signed: Andrea Lopez MD  12/6/2024 12:23 PM EST  Workstation ID: [id number]    XR Chest 1 View    Result Date: 12/5/2024  Impression: Improved left lower lobe infiltrate. No new abnormality. Electronically Signed: Aster Mathur MD  12/5/2024 11:36 AM EST  Workstation ID: [id number]    XR Chest 1 View    Result Date: 11/6/2024  Impression: Cardiomegaly. Left basilar atelectasis. Small bilateral pleural effusions. Electronically Signed: Timothy Willoughby MD  11/6/2024 4:07 AM EST  Workstation ID: [id number]    FL ERCP pancreatic and biliary ducts    Result Date: 11/5/2024  Impression: Fluoroscopy provided during ERCP. Electronically Signed: Olievrio Younger MD  11/5/2024 9:07 AM EST  Workstation ID: [id number]    FL ERCP pancreatic and biliary ducts    Result Date: 9/5/2024  Impression: Fluoroscopy provided during ERCP and stent placement. Electronically Signed: Oliverio Younger MD  9/5/2024 4:25 PM EDT  Workstation ID: [id number]    MRI abdomen wo contrast mrcp    Result Date: 9/5/2024  Impression: Choledocholithiasis with several small stones in the distal common bile duct. The largest stone is about 5 mm. The common bile duct is not dilated. Electronically Signed: Timothy Willoughby MD  9/5/2024 5:19 AM EDT  Workstation ID: [id number]    CT Abdomen Pelvis With Contrast    Result Date: 9/4/2024  1. There appears to be debris or noncalcified calculi within the distal common bile duct. No evidence of cholecystitis at this time. 2. No pulmonary embolus. 3. Small right and trace left pleural effusions. 4. 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 Cardiology consult recommended. Electronically Signed: Yoni Rios MD  9/4/2024 8:06 PM EDT  Workstation ID: [id number]    CT Angiogram Chest Pulmonary Embolism    Result Date: 9/4/2024  1. There  appears to be debris or noncalcified calculi within the distal common bile duct. No evidence of cholecystitis at this time. 2. No pulmonary embolus. 3. Small right and trace left pleural effusions. 4. 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 Cardiology consult recommended. Electronically Signed: Yoni Rios MD  9/4/2024 8:06 PM EDT  Workstation ID: [id number]    CT Head Without Contrast    Result Date: 9/4/2024  Impression: 1. No acute intracranial findings by CT. Chronic findings detailed above similar to previous MRI brain comparison. 2. Possible acute on chronic sinusitis in the right clinical setting, with layering fluid noted in the right maxillary sinus. Electronically Signed: Compa Dominguez MD  9/4/2024 7:56 PM EDT  Workstation ID: [id number]    XR Chest 1 View    Result Date: 9/4/2024  Impression: No acute radiographic findings. Stable appearance of the chest since 6/3/2024 comparison. Electronically Signed: Compa Dominguez MD  9/4/2024 6:13 PM EDT  Workstation ID: [id number]      Measures:   Tobacco:   Darien Camarena  reports that he quit smoking about 64 years ago. His smoking use included cigarettes. He started smoking about 78 years ago. He has a 15 pack-year smoking history. He has been exposed to tobacco smoke. He quit smokeless tobacco use about 14 years ago.  His smokeless tobacco use included chew.       Assessment / Plan      Assessment/Plan:   89 y.o. male who presented today for follow up of chronic urinary tension, chronic vesicoureteral reflux causing left hydronephrosis and renal atrophy.  Recurrent UTI.  Recent admission for infection, urine culture grew E. coli.  Cleared with outpatient antibiotic.  Doing well today.  Catheter remains in place.  Exchanged by nursing today.  Had a tarah discussion with the patient and his daughter.  Despite self-catheterization he likely had persistent vesicoureteral reflux causing obstruction and retention of urine in the upper collecting system  which places him at risk of recurrent UTI and hospitalization.  The safest option would be chronic indwelling Del Cid catheter to maximally drain the system.  He is tolerating his penile catheter.  Given his age and comorbidities we could explore a suprapubic catheter in the future if he does not tolerate his penile catheter or if he develops breakthrough infections.  He we will set him up with home health for catheter exchanges on a monthly basis.    Diagnoses and all orders for this visit:    1. Urinary retention (Primary)  -     Ambulatory Referral to Home Health (Outpatient)    2. Hydronephrosis of left kidney  -     Ambulatory Referral to Home Health (Outpatient)    3. Vesicoureter-reflux w reflux neuropath w hydrourt, unil  -     Ambulatory Referral to Home Health (Outpatient)           Follow Up:   Return in about 3 months (around 3/30/2025) for Follow up with Charmaine Marte NP.    I spent approximately 30 minutes providing clinical care for this patient; including review of patient's chart and provider documentation, face to face time spent with patient in examination room (obtaining history, performing physical exam, discussing diagnosis and management options), placing orders, and completing patient documentation.     Krystian Larkin MD  [facility] [place]

## 2024-12-31 ENCOUNTER — TELEPHONE (OUTPATIENT)
Age: 89
End: 2024-12-31
Payer: MEDICARE

## 2024-12-31 RX ORDER — AMIODARONE HYDROCHLORIDE 200 MG/1
200 TABLET ORAL DAILY
Qty: 30 TABLET | Refills: 0 | Status: SHIPPED | OUTPATIENT
Start: 2024-12-31 | End: 2025-01-02

## 2024-12-31 NOTE — TELEPHONE ENCOUNTER
----- Message from Charmaine Marte sent at 12/31/2024  2:08 PM EST -----  The 04/1 date is good!    Thanks  Charmaine Marte  ----- Message -----  From: Yesenia Stewart RegSched Rep  Sent: 12/30/2024   2:05 PM EST  To: BERYL Nino!    PT has a follow up with you on 03/05/25 and just saw Dr. Larkin on 12/30/24. Per Trae he wanted him to follow up with you in 3 months scheduled 04/01/25. Which would you like PT to keep? Thank you!

## 2025-01-02 ENCOUNTER — OFFICE VISIT (OUTPATIENT)
Dept: CARDIOLOGY | Facility: CLINIC | Age: 89
End: 2025-01-02
Payer: MEDICARE

## 2025-01-02 VITALS
WEIGHT: 215 LBS | BODY MASS INDEX: 26.73 KG/M2 | DIASTOLIC BLOOD PRESSURE: 82 MMHG | HEART RATE: 126 BPM | OXYGEN SATURATION: 90 % | HEIGHT: 75 IN | SYSTOLIC BLOOD PRESSURE: 132 MMHG

## 2025-01-02 DIAGNOSIS — I10 ESSENTIAL HYPERTENSION: Chronic | ICD-10-CM

## 2025-01-02 DIAGNOSIS — I48.21 PERMANENT ATRIAL FIBRILLATION: Primary | Chronic | ICD-10-CM

## 2025-01-02 RX ORDER — DIGOXIN 125 MCG
125 TABLET ORAL DAILY
Qty: 30 TABLET | Refills: 11 | Status: SHIPPED | OUTPATIENT
Start: 2025-01-02

## 2025-01-02 NOTE — PROGRESS NOTES
Darien Camarena  [id number]  1935  [phone]      01/02/2025      [facility] CARDIOLOGY     Referring Provider: No ref. provider found     Pito Way MD  [address] [facility] [address]  [place] [id number]    Chief Complaint   Patient presents with    Permanent atrial fibrillation     Problem List  Persistent atrial fibrillation  Diagnosed August 2012.   FARHAD-guided ECV, 8/17/2012  CHADS-VASc 5 (age > 75, CAD, HTN, DM)  Successful external cardioversion for asymptomatic atrial fibrillation with RVR, 10/25/18  Successful cardioversion for atrial fibrillation RVR, 3/6/19.  Sotalol increased  Minimally symptomatic atrial fibrillation with cardioversion and the ER, 10/31/2019  ED cardioversion for A. fib with RVR, 7/21/2020  ED cardioversion for asymptomatic A. fib with RVR, 12/27/2020   ED cardioversion for asymptomatic A. fib with RVR x 2  occasions, March 2021  Transition from sotalol to amiodarone, 4/14/2021  Successful FARHAD/ECV May 3, 2021: normal LVEF, mild MR  Successful cardioversion, 8/5/2022  Echo 8/22 EF 50%, anatomically and functionally normal valves  Unsuccessful ECV 6/08/2023 - pt converted spontaneously to SR 2 min after ECV  Watchman LAAO device, 11/28/24 by Dr. Mcdaniel  FARHAD, 11/22/24: EF 36-40%, 27 mm watchman flex device in place.  Well-seated and well compressed with no device related thrombus.  Small jet of color 2.5 mm at the superior aspect adjacent to the left upper pulmonary vein.  Mild MR, mild TR  GI Bleed - on Xarelto  CAD   Nuclear MPS 09/15/2022:small-sized, mildly severe area of ischemia located in the apex.  LVEF 56%  [facility] 9/29/2022 - mild CAD  HTN  HLD  DM II  GERD  CKD Stage 3  Gout  STEVEN    History of Present Illness   Darien Camarena is a 89 y.o. male who presents to my electrophysiology clinic for follow up of AF.  Patient was hospitalized in December for CHF exacerbation.  He recently was discharged from the [facility].  He was not on his amiodarone  while at the [facility].  Today in clinic his heart rate is in the 120s.  Patient states he feels great and he is asymptomatic.  He is following with urology and most likely will have to have a indwelling catheter for the foreseeable future.  In November patient had FARHAD for 1 year watchman surveillance.  Device is well-seated with no device related thrombus but did show 2.5 mm flow.  He is on aspirin only.    Outpatient Medications Marked as Taking for the 1/2/25 encounter (Office Visit) with Iris Jo APRN   Medication Sig Dispense Refill    ascorbic acid (VITAMIN C) 1000 MG tablet Take 1 tablet by mouth Daily. (Patient taking differently: Take 1 tablet by mouth 2 (Two) Times a Day.)      aspirin 81 MG EC tablet Take 1 tablet by mouth Daily.      Coenzyme Q10 300 MG capsule Take 1 capsule by mouth Every Night.      docusate sodium (COLACE) 100 MG capsule Take 2 capsules by mouth Daily.      Ferrous Gluconate (IRON) 240 (27 FE) MG tablet Take 1 tablet by mouth Daily.      melatonin 5 MG tablet tablet Take 1 tablet by mouth At Night As Needed (insomnia / sleep).      metoprolol tartrate (LOPRESSOR) 50 MG tablet Take 1 tablet by mouth Every 12 (Twelve) Hours. 120 tablet 5    multivitamin with minerals (MULTIVITAMIN MEN 50+ PO) Take 1 tablet by mouth Every Night. Centrum Sliver      ondansetron (ZOFRAN) 4 MG tablet Take 1 tablet by mouth Every 6 (Six) Hours As Needed for Nausea or Vomiting.      pravastatin (PRAVACHOL) 40 MG tablet Take 1 tablet by mouth Every Night.      Probiotic Product (PROBIOTIC ADVANCED PO) Take 1 tablet by mouth 2 (Two) Times a Day.      [DISCONTINUED] albuterol sulfate  (90 Base) MCG/ACT inhaler Inhale 2 puffs Every 4 (Four) Hours As Needed for Wheezing. 54 g 1    [DISCONTINUED] allopurinol (ZYLOPRIM) 300 MG tablet Take 1 tablet by mouth Daily. 90 tablet 1    [DISCONTINUED] amiodarone (PACERONE) 200 MG tablet Take 1 tablet by mouth Daily. 30 tablet 0    [DISCONTINUED] Ascorbic Acid  "(VITAMIN C PO) Take 1,000 mg by mouth 2 (Two) Times a Day.      [DISCONTINUED] donepezil (ARICEPT) 10 MG tablet Take 1 tablet by mouth Every Night. 90 tablet 1    [DISCONTINUED] finasteride (PROSCAR) 5 MG tablet Take 1 tablet by mouth every night at bedtime. 90 tablet 1    [DISCONTINUED] furosemide (LASIX) 20 MG tablet Take 1 tablet by mouth Daily. (Patient taking differently: Take 2 tablets by mouth Daily. 0.5 tablet daily)      [DISCONTINUED] memantine (NAMENDA) 10 MG tablet Take 1 tablet by mouth 2 (Two) Times a Day. 180 tablet 1    [DISCONTINUED] metFORMIN (GLUCOPHAGE) 1000 MG tablet Take 0.5 tablets by mouth 2 (Two) Times a Day With Meals.      [DISCONTINUED] methenamine (HIPREX) 1 g tablet Take 1 tablet by mouth 2 (Two) Times a Day With Meals. 180 tablet 1    [DISCONTINUED] omeprazole (priLOSEC) 20 MG capsule Take 2 capsules by mouth Every Morning Before Breakfast.      [DISCONTINUED] sertraline (ZOLOFT) 50 MG tablet Take 1 tablet by mouth Daily. 90 tablet 1    [DISCONTINUED] tiotropium bromide-olodaterol (STIOLTO RESPIMAT) 2.5-2.5 MCG/ACT aerosol solution inhaler Inhale 2 puffs Daily. 2 inh once a day 3 each 3            Physical Exam  Vitals:    01/02/25 1310   BP: 132/82   BP Location: Left arm   Patient Position: Sitting   Pulse: (!) 126   SpO2: 90%   Weight: 97.5 kg (215 lb)   Height: 190.5 cm (75\")     Body mass index is 26.87 kg/m².  Constitutional:       Appearance: Healthy appearance.   Neck:      Vascular: JVD normal.   Pulmonary:      Effort: Pulmonary effort is normal.      Breath sounds: Normal breath sounds.   Cardiovascular:      Tachycardia present. Irregular rhythm. Normal S1. Normal S2.       Murmurs: There is no murmur.      No gallop.  No rub.   Pulses:     Intact distal pulses.   Edema:     Peripheral edema absent.   Neurological:      General: No focal deficit present.        Diagnostic Data  Procedures  EKG: Atrial fibrillation with RVR, right axis deviation, nonspecific ST and T wave " abnormality, QRS 82 ms, QT/QTc 374/517 ms    Lab Results   Component Value Date    GLUCOSE 157 (H) 12/17/2024    CALCIUM 8.8 12/17/2024     12/17/2024    K 4.0 12/17/2024    CO2 27.0 12/17/2024     12/17/2024    BUN 19 12/17/2024    CREATININE 0.66 (L) 12/17/2024    EGFRIFNONA 79 12/16/2021    BCR 28.8 (H) 12/17/2024    ANIONGAP 7.0 12/17/2024     Lab Results   Component Value Date    WBC 5.63 12/17/2024    HGB 11.9 (L) 12/17/2024    HCT 38.1 12/17/2024    MCV 98.4 (H) 12/17/2024     12/17/2024     Lab Results   Component Value Date    INR 1.38 (H) 11/06/2024    INR 1.38 (H) 09/05/2024    INR 1.34 (H) 09/04/2024    PROTIME 17.6 (H) 11/06/2024    PROTIME 17.1 (H) 09/05/2024    PROTIME 17.2 (H) 09/04/2024     Lab Results   Component Value Date    TSH 3.690 05/07/2024       I personally viewed and interpreted the patient's EKG/Telemetry/lab data    Darien Camarena  reports that he quit smoking about 64 years ago. His smoking use included cigarettes. He started smoking about 78 years ago. He has a 15 pack-year smoking history. He has been exposed to tobacco smoke. He quit smokeless tobacco use about 14 years ago.  His smokeless tobacco use included chew. I have educated him on the risk of diseases from using tobacco products such as cancer, COPD, and heart disease.       ACP discussion was declined by the patient. Patient does not have an advance directive, declines further assistance.    Assessment and Plan  Diagnoses and all orders for this visit:    1. Permanent atrial fibrillation (Primary)  -     Digoxin Level; Future  -     Basic Metabolic Panel; Future    2. Essential hypertension    Other orders  -     digoxin (LANOXIN) 125 MCG tablet; Take 1 tablet by mouth Daily.  Dispense: 30 tablet; Refill: 11        Permanent AF  - groslly asymptomatic; continue rate control  - hx of GIB and hematuria (due to frequent self catheterization), now s/p Watchman procedure and off of AC  -On aspirin  only  -Patient is in atrial fibrillation with RVR today.  Patient is asymptomatic.  Will stop amiodarone and initiate digoxin 125 mcg daily.  Patient will digoxin level and bmp in 1 week.   -Asked patient to monitor heart rate at home and document.  Call us if heart rate is consistently above 110 bpm.      HTN  - well controlled at home per patient  - continue metoprolol and valsartan      Follow-Up  Return in about 3 months (around 4/2/2025).      Thank you for allowing me to participate in the care of your patient. Please to not hesitate to contact me with additional questions or concerns.     Iris Jo, BERYL

## 2025-01-03 ENCOUNTER — OFFICE VISIT (OUTPATIENT)
Dept: RADIATION ONCOLOGY | Facility: HOSPITAL | Age: 89
End: 2025-01-03
Payer: MEDICARE

## 2025-01-03 ENCOUNTER — HOSPITAL ENCOUNTER (OUTPATIENT)
Dept: RADIATION ONCOLOGY | Facility: HOSPITAL | Age: 89
Setting detail: RADIATION/ONCOLOGY SERIES
Discharge: HOME | End: 2025-01-03
Payer: MEDICARE

## 2025-01-03 ENCOUNTER — OFFICE VISIT (OUTPATIENT)
Dept: INTERNAL MEDICINE | Facility: CLINIC | Age: 89
End: 2025-01-03
Payer: MEDICARE

## 2025-01-03 VITALS
HEART RATE: 86 BPM | BODY MASS INDEX: 27.35 KG/M2 | HEIGHT: 75 IN | WEIGHT: 220 LBS | RESPIRATION RATE: 18 BRPM | SYSTOLIC BLOOD PRESSURE: 124 MMHG | DIASTOLIC BLOOD PRESSURE: 80 MMHG | TEMPERATURE: 97.1 F

## 2025-01-03 VITALS
BODY MASS INDEX: 27.5 KG/M2 | WEIGHT: 220 LBS | OXYGEN SATURATION: 96 % | RESPIRATION RATE: 16 BRPM | TEMPERATURE: 96.8 F | DIASTOLIC BLOOD PRESSURE: 100 MMHG | SYSTOLIC BLOOD PRESSURE: 159 MMHG | HEART RATE: 74 BPM

## 2025-01-03 DIAGNOSIS — M1A.9XX0 CHRONIC GOUT WITHOUT TOPHUS, UNSPECIFIED CAUSE, UNSPECIFIED SITE: ICD-10-CM

## 2025-01-03 DIAGNOSIS — I10 ESSENTIAL HYPERTENSION: Chronic | ICD-10-CM

## 2025-01-03 DIAGNOSIS — Z09 HOSPITAL DISCHARGE FOLLOW-UP: Primary | ICD-10-CM

## 2025-01-03 DIAGNOSIS — E11.22 TYPE 2 DIABETES MELLITUS WITH STAGE 3A CHRONIC KIDNEY DISEASE, WITHOUT LONG-TERM CURRENT USE OF INSULIN: ICD-10-CM

## 2025-01-03 DIAGNOSIS — R91.1 NODULE OF LOWER LOBE OF LEFT LUNG: ICD-10-CM

## 2025-01-03 DIAGNOSIS — R33.9 ACUTE ON CHRONIC URINARY RETENTION: ICD-10-CM

## 2025-01-03 DIAGNOSIS — R91.1 NODULE OF LOWER LOBE OF LEFT LUNG: Primary | ICD-10-CM

## 2025-01-03 DIAGNOSIS — R53.81 PHYSICAL DECONDITIONING: ICD-10-CM

## 2025-01-03 DIAGNOSIS — I50.22 HEART FAILURE WITH MILDLY REDUCED EJECTION FRACTION (HFMREF): ICD-10-CM

## 2025-01-03 DIAGNOSIS — R41.3 MEMORY LOSS: ICD-10-CM

## 2025-01-03 DIAGNOSIS — F32.9 REACTIVE DEPRESSION: ICD-10-CM

## 2025-01-03 DIAGNOSIS — K21.00 GASTROESOPHAGEAL REFLUX DISEASE WITH ESOPHAGITIS WITHOUT HEMORRHAGE: ICD-10-CM

## 2025-01-03 DIAGNOSIS — G31.84 MILD COGNITIVE IMPAIRMENT: ICD-10-CM

## 2025-01-03 DIAGNOSIS — N18.31 TYPE 2 DIABETES MELLITUS WITH STAGE 3A CHRONIC KIDNEY DISEASE, WITHOUT LONG-TERM CURRENT USE OF INSULIN: ICD-10-CM

## 2025-01-03 PROCEDURE — 1160F RVW MEDS BY RX/DR IN RCRD: CPT | Performed by: STUDENT IN AN ORGANIZED HEALTH CARE EDUCATION/TRAINING PROGRAM

## 2025-01-03 PROCEDURE — 1159F MED LIST DOCD IN RCRD: CPT | Performed by: STUDENT IN AN ORGANIZED HEALTH CARE EDUCATION/TRAINING PROGRAM

## 2025-01-03 PROCEDURE — 99215 OFFICE O/P EST HI 40 MIN: CPT | Performed by: STUDENT IN AN ORGANIZED HEALTH CARE EDUCATION/TRAINING PROGRAM

## 2025-01-03 PROCEDURE — 1126F AMNT PAIN NOTED NONE PRSNT: CPT | Performed by: STUDENT IN AN ORGANIZED HEALTH CARE EDUCATION/TRAINING PROGRAM

## 2025-01-03 PROCEDURE — 1111F DSCHRG MED/CURRENT MED MERGE: CPT | Performed by: STUDENT IN AN ORGANIZED HEALTH CARE EDUCATION/TRAINING PROGRAM

## 2025-01-03 RX ORDER — ALLOPURINOL 300 MG/1
300 TABLET ORAL DAILY
Qty: 90 TABLET | Refills: 1 | Status: SHIPPED | OUTPATIENT
Start: 2025-01-03

## 2025-01-03 RX ORDER — OMEPRAZOLE 40 MG/1
40 CAPSULE, DELAYED RELEASE ORAL
Qty: 90 CAPSULE | Refills: 1
Start: 2025-01-03

## 2025-01-03 RX ORDER — FUROSEMIDE 20 MG/1
20 TABLET ORAL DAILY
Qty: 90 TABLET | Refills: 1 | Status: SHIPPED | OUTPATIENT
Start: 2025-01-03

## 2025-01-03 RX ORDER — MEMANTINE HYDROCHLORIDE 10 MG/1
10 TABLET ORAL 2 TIMES DAILY
Qty: 180 TABLET | Refills: 1 | Status: SHIPPED | OUTPATIENT
Start: 2025-01-03

## 2025-01-03 RX ORDER — FINASTERIDE 5 MG/1
5 TABLET, FILM COATED ORAL
Qty: 90 TABLET | Refills: 1 | Status: SHIPPED | OUTPATIENT
Start: 2025-01-03

## 2025-01-03 RX ORDER — DONEPEZIL HYDROCHLORIDE 10 MG/1
10 TABLET, FILM COATED ORAL NIGHTLY
Qty: 90 TABLET | Refills: 1 | Status: SHIPPED | OUTPATIENT
Start: 2025-01-03

## 2025-01-03 RX ORDER — ALBUTEROL SULFATE 90 UG/1
2 INHALANT RESPIRATORY (INHALATION) EVERY 4 HOURS PRN
Qty: 54 G | Refills: 1 | Status: SHIPPED | OUTPATIENT
Start: 2025-01-03

## 2025-01-03 RX ORDER — METHENAMINE HIPPURATE 1000 MG/1
1 TABLET ORAL 2 TIMES DAILY WITH MEALS
Qty: 180 TABLET | Refills: 1 | Status: SHIPPED | OUTPATIENT
Start: 2025-01-03

## 2025-01-03 NOTE — PROGRESS NOTES
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 male with multiple medical comorbidities including COPD, CHF, Afib status post watchman device, hypertension, CKD stage III, and remote tobacco abuse who was incidentally found to have a 2.2 cm left lower lobe pulmonary nodule on CT scan of the chest for work-up of acute hypoxic respiratory failure secondary to Influenza A infection.  The patient was sent for outpatient PET scan, showing the subsolid left lower lobe nodule to be mildly hypermetabolic and concerning for primary malignancy.  There was otherwise no evidence of hypermetabolic hilar/mediastinal lymphadenopathy or distant metastatic disease.  The patient was uninterested in risks associated with biopsy.  The patient was not considered a candidate for surgery.  The patient underwent definitive empiric treatment with a course of SBRT consisting of 50 Gy in 5 fractions delievered on the [facility] Radixact.  He completed treatments 2023.     The patient presents today for scheduled follow-up visit after having undergone surveillance CT scan of the chest on 2024.  Date of imaging coincided with a hospitalization from 2024-2024 for initial work up of generalized weakness and hypotension.      At this point, the patient reports feeling better but still somewhat weak since returning home.  He remains independent with ADLs and uses a rollator.  He notes stable exertional dyspnea but otherwise denies dyspnea at rest, wet/dry cough, hemoptysis,  chest/rib pain, fever, chills.  He describes stable dysphagia predating his prior radiotherapy course.  He reports poor appetite and has lost some weight in recent months.  He takes Lasix 20 mg daily and wears compression stockings, noting no significant peripheral edema.      He and his daughter wish to discuss CT scan findings.      MEDICATIONS: Medication reconciliation for the patient was reviewed and confirmed in the electronic medical record.    Review of Systems   Constitutional:  Positive for fatigue.   HENT:   Positive for trouble swallowing.    Respiratory:  Positive for cough and shortness of breath (with exertion).    Genitourinary:  Positive for difficulty urinating (hx chronic urinary retention - sees urology).    Musculoskeletal:  Positive for gait problem (rollator walker).   Neurological:  Positive for gait problem (rollator walker).   All other systems reviewed and are negative.          KPS 70%      Physical Exam  Vitals and nursing note reviewed.   Constitutional:       General: He is not in acute distress.     Appearance: He is well-developed.      Comments: Pleasant elderly gentleman sitting upright in wheelchair no apparent distress.   HENT:      Head: Normocephalic and atraumatic.   Eyes:      Conjunctiva/sclera: Conjunctivae normal.      Pupils: Pupils are equal, round, and reactive to light.   Cardiovascular:      Rate and Rhythm: Normal rate.      Heart sounds: No murmur heard.     No friction rub.   Pulmonary:      Effort: Pulmonary effort is normal. No respiratory distress.      Breath sounds: Normal breath sounds. No wheezing, rhonchi or rales.   Genitourinary:     Comments: Indwelling f/c attached to legbag  Musculoskeletal:         General: Normal range of motion.      Cervical back: Normal range of motion and neck supple.   Lymphadenopathy:      Cervical: No cervical adenopathy.   Skin:     General: Skin is warm and dry.   Neurological:      Mental Status: He is alert and oriented to  person, place, and time.   Psychiatric:         Behavior: Behavior normal.         Thought Content: Thought content normal.         Judgment: Judgment normal.         VITAL SIGNS:   Vitals:    01/03/25 0320   BP: 159/100   Pulse: 74   Resp: 16   Temp: 96.8 °F (36 °C)   TempSrc: Temporal   SpO2: 96%   Weight: 99.8 kg (220 lb)   PainSc: 0-No pain           IMAGING:  I have personally reviewed the following imaging study:  Narrative & Impression   CT CHEST W CONTRAST DIAGNOSTIC     Date of Exam: 12/9/2024 1:56 PM EST     Indication: f/u lung nodule.     Comparison: Chest radiograph from December 5, 2024 and CT chest from September 4, 2024     Technique: Axial CT images were obtained of the chest after the uneventful intravenous administration of 85 mL Isovue-300.  Reconstructed coronal and sagittal images were also obtained. Automated exposure control and iterative construction methods were   used.        Findings:  There is debris within the left mainstem bronchus. There is a moderate left pleural effusion with left basilar atelectasis obscures the region of the previously identified left lower lobe nodule. There is a small right pleural effusion with right basilar   atelectasis. No new pulmonary nodule is identified.     The heart size appears normal. The great vessels are normal in caliber. No abnormally enlarged lymph nodes are identified.     Partial evaluation of the upper abdomen demonstrates left renal cyst.     No aggressive osseous lesions are identified.     IMPRESSION:  Impression:  1.Moderate left pleural effusion with left basilar atelectasis obscures the region of the previously identified left lower lobe nodule. Recommend follow-up CT chest in 3 months.  2.Small right pleural effusion with right basilar atelectasis.           Electronically Signed: Renny Good MD    12/9/2024 2:51 PM EST    Workstation ID: [id number]       The following portions of the patient's history were reviewed and updated as  appropriate: allergies, current medications, past family history, past medical history, past social history, past surgical history and problem list.         Diagnoses and all orders for this visit:    1. Nodule of lower lobe of left lung (Primary)  -     CT Chest Without Contrast; Future         IMPRESSION:  Darien Camarena is an 89 y.o. gentleman with history of a suspected early stage primary bronchogenic carcinoma of the left lower lobe of lung.  The patient was uninterested in biopsy.  The patient underwent definitive, empiric SBRT to this lesion, completing 2/24/2023.  The patient had a good response with evidence of fibrosis around the lesion on subsequent imaging.  Most recent CT scan of the chest performed 12/9/2024 demonstrates small right pleural effusion, and moderate left pleural effusion with left basilar atelectasis which obscures complete visualization of the region of the previously treated left lower lobe lung nodule.  There are otherwise no new pulmonary parenchymal nodules or mediastinal/hilar lymphadenopathy.  I will repeat 3-month CT scan to follow for stability and if no evidence of progressive disease at that time then revert back to q6 month cycle of surveillance imaging.  I will order noncontrasted imaging in light of the patient's CKD stage III.      RECOMMENDATIONS:    Left lower lobe primary bronchogenic carcinoma  -cT1b N0 M0   -Radiographic diagnosis  -Hypermetabolic on PET scan  -No evidence of regional or distant metastatic disease  -Patient uninterested in biopsy  -Status post definitive empiric SBRT on the Radixact, 50 Gy in 5 fractions              -completed 2/24/2023  -Post treatment CT chest shows no evidence of disease progression with interval treatment response  -Most recent CT chest 12/9/2024 demonstrates moderate left pleural effusion and left basilar atelectasis obscuring visualization of previously irradiated region with repeat 3 month CT scan recommended.  Otherwise, no  additional evidence suggestive of new/progressive disease  -Repeat noncontrast CT scan of chest due ~3/2025       CHF   -Discussed relationship between fluid volume overload and progressive lung symptoms such as dyspnea, cough, etc.   -Discussed CHF as common cause of pleural effusions in the lungs   -Continues Lasix 20 mg daily  -Continues follow up with cardiology           Return in about 2 months (around 3/12/2025) for Office Visit, Imaging - See orders.    Rafa Lin, APRN    I spent a total of 40 minutes on todays visit, with more than 20 minutes in direct face to face communication, and the remainder of the time spent in reviewing the relevant history, records, available imaging, and for documentation.

## 2025-01-03 NOTE — PROGRESS NOTES
"Transitional Care Follow Up Visit  Subjective     Darien Camarena is a 89 y.o. male  with history of Afib s/p watchman device, chronic systolic heart failure (last EF 46-50% on 1/26/24), T2DM with CKD stage 3, Chronic back pain, STEVEN, chronic urinary retention who presents for a transitional care management visit.    Within 48 business hours after discharge our office contacted him via telephone to coordinate his care and needs.      I reviewed and discussed the details of that call along with the discharge summary, hospital problems, inpatient lab results, inpatient diagnostic studies, and consultation reports with Darien.     Current outpatient and discharge medications have been reconciled for the patient.  Reviewed by: Benja Campbell MD          9/8/2024     2:47 PM   Date of TCM Phone Call   [facility]   Date of Admission 9/4/2024   Date of Discharge 9/8/2024   Discharge Disposition Home or Self Care     Risk for Readmission (LACE) No data recorded    History of Present Illness   Course During Hospital Stay:      Per DC summary dated 12/18/24 by Lizet MACHUCA of [facility] medicine:  \"Hospital Course:  Darien Camarena is an 89yoM with PMH significant for HTN, HLD, chronic HFrEF, chronic atrial fibrillation s/p watchman, DMII, CKD stage III and chronic urinary retention (self caths). Presented to urology office on 12/5 due to recent difficulty passing catheters through urethra. While in office, he also complained of dizziness x 1 week and was noted to be hypotensive with BP 92/60. He was directed to go to the ED for further evaluation. In ED, found to have orthostatic hypotension, UTI and severe L hydronephrosis.      Acute E. Coli UTI, POA   Chronic urinary retention with severe left hydronephrosis, improved  - Urology followed - appreciate assistance   - Recent difficulty self cathing likely secondary to developing urethral stricture  - Del Cid catheter placed on 12/5. Urology " "recommends to discharge with Del Cid catheter in place and follow up outpatient in 2 weeks   - UA concerning for UTI.   - Urine culture grew pan-susecptible E. Coli. He has completed a course of antibiotics.   - Urine culture with pansensitive E. coli. Has completed a course of antibiotics   - Resume home Methenamine at [place]      Orthostatic hypotension, resolved  - Likely secondary to poor oral intake and increased Lasix dose   - s/p IV fluids with improvement.   - PO intake improving     HFrEF, compensated  Chronic atrial atrial fibrillation  - Echo from 11/21/24 with EF 35-to 40%  - Cardiology followed this admission   - Not anticoagulated - s/p Watchman device placement. Continue ASA   - Recently had a recent increase in Lasix due to lower extremity edema  - Lasix on hold during most of this admission - patient has remained euvolemic with minimal lower extremity edema. Will [place] on previous dose of Lasix - 20mg PO daily  - Continue Metoprolol and Amiodarone     LLL lung nodule   - 4/5/24 CT Chest showed stable 1.5cm LLL lung nodule with increased attenuation, similar to prior studies  - CT chest on 12/9 showed moderate L pleural efffusion and bibasilar atelectasis that obscured the region of the previously identified LLL lung nodule.   - Needs repeat CT chest in ~3 months   - Will refer to lung nodule clinic at [place]      Right foot pain secondary to gout flare, improved  - CRP and sedimentation rate are elevated  - X-ray with advanced arthritis but no other acute findings or fractures  - Pain improved with colchicine. Plan to continue treatment for 2 weeks, through 12/23/24  - Resume Allopurinol at [place]      CKD stage III  - Creatinine stable / at baseline     Well-controlled type 2 diabetes   - A1c 5.9%  - Continue Metformin 500mg BID at [place]      Dementia and depression  - Continue Aricept and Namenda  - Continue Zoloft     Chronic weakness  - PT/OT followed - to [facility] for rehab today \"    - I personally reviewed note " by Urology, Dr. Krystian Larkin dated 12/30/24. Had rahman exchange at that time. Recommended chronic indwelling rahman to reduce risk of UTI and VUR. Recommended home health for monthly exchanges. RTC in 3 months.     - Followed up with Cardiology yesterday, Iris CORDOBA, note not complete. Recommend f/u in 3 months.     History of Present Illness  The patient presents for a follow-up visit after a hospital stay.    History is provided by other person in the presence of the patient (daughter, due to memory impairment)  He has been experiencing weakness and instability, necessitating physical therapy. He has been attending [facility], but due to his current state of weakness and instability, it is recommended that he continue with physical therapy. [facility] is scheduled to provide occupational and physical therapy services at home. He has been managing his medication regimen with the assistance of his daughter, including halving his Lasix dosage. He is currently on a 50 mg dose of metoprolol. He has been taking melatonin 5 mg for sleep, but reports poor sleep quality. He has been experiencing ear discomfort, which he describes as a constant sensation of something being present in his ear. He has not experienced any respiratory distress for an extended period.     He has an appointment scheduled today at 3:00 PM with the lung nodule clinic. A CT scan was performed during his recent hospital stay.    MEDICATIONS  Current: allopurinol, metformin, donepezil, finasteride, Lasix, memantine, metoprolol, omeprazole, Zoloft, melatonin, albuterol, tiotropium               The following portions of the patient's history were reviewed and updated as appropriate: allergies, current medications, past family history, past medical history, past social history, past surgical history, and problem list.    Review of Systems    Objective   /80 (BP Location: Right arm, Patient Position: Sitting, Cuff Size:  "Adult)   Pulse 86   Temp 97.1 °F (36.2 °C) (Infrared)   Resp 18   Ht 190.5 cm (75\")   Wt 99.8 kg (220 lb)   BMI 27.50 kg/m²   Physical Exam  Vitals reviewed.   Constitutional:       General: He is not in acute distress.     Appearance: Normal appearance. He is normal weight. He is not ill-appearing or toxic-appearing.   HENT:      Head: Normocephalic and atraumatic.      Right Ear: Tympanic membrane, ear canal and external ear normal.      Left Ear: Tympanic membrane, ear canal and external ear normal.      Nose: Nose normal.      Mouth/Throat:      Mouth: Mucous membranes are moist.   Eyes:      Extraocular Movements: Extraocular movements intact.   Cardiovascular:      Rate and Rhythm: Normal rate. Rhythm irregular.      Pulses: Normal pulses.      Heart sounds: Normal heart sounds.   Pulmonary:      Effort: Pulmonary effort is normal. No respiratory distress.      Breath sounds: Normal breath sounds. No wheezing or rales.   Abdominal:      General: Abdomen is flat. There is no distension.      Palpations: Abdomen is soft.      Tenderness: There is no abdominal tenderness.   Genitourinary:     Comments: Del Cid attached to left leg with clear yellow urine in bag  Musculoskeletal:         General: No swelling or tenderness.      Cervical back: Normal range of motion. No rigidity.   Skin:     General: Skin is warm and dry.      Capillary Refill: Capillary refill takes less than 2 seconds.   Neurological:      General: No focal deficit present.      Mental Status: He is alert. Mental status is at baseline.   Psychiatric:         Mood and Affect: Mood normal.         Behavior: Behavior normal.         Assessment & Plan   Problems Addressed this Visit       Type 2 diabetes mellitus with stage 3 chronic kidney disease, without long-term current use of insulin    Relevant Medications    furosemide (LASIX) 20 MG tablet    metFORMIN (GLUCOPHAGE) 500 MG tablet    Gastroesophageal reflux disease with esophagitis " (Chronic)    Relevant Medications    omeprazole (priLOSEC) 40 MG capsule    Essential hypertension (Chronic)    Relevant Medications    furosemide (LASIX) 20 MG tablet    Nodule of lower lobe of left lung    Relevant Medications    albuterol sulfate  (90 Base) MCG/ACT inhaler    tiotropium bromide-olodaterol (STIOLTO RESPIMAT) 2.5-2.5 MCG/ACT aerosol solution inhaler    Heart failure with mildly reduced ejection fraction (HFmrEF)    Relevant Medications    furosemide (LASIX) 20 MG tablet    Acute on chronic urinary retention    Relevant Medications    finasteride (PROSCAR) 5 MG tablet    methenamine (HIPREX) 1 g tablet     Other Visit Diagnoses       Hospital discharge follow-up    -  Primary    Physical deconditioning        Chronic gout without tophus, unspecified cause, unspecified site        Relevant Medications    allopurinol (ZYLOPRIM) 300 MG tablet    Memory loss        Relevant Medications    donepezil (ARICEPT) 10 MG tablet    memantine (NAMENDA) 10 MG tablet    Reactive depression        Relevant Medications    donepezil (ARICEPT) 10 MG tablet    memantine (NAMENDA) 10 MG tablet    sertraline (ZOLOFT) 50 MG tablet    Mild cognitive impairment        Relevant Medications    donepezil (ARICEPT) 10 MG tablet    memantine (NAMENDA) 10 MG tablet          Diagnoses         Codes Comments    Hospital discharge follow-up    -  Primary ICD-10-CM: Z09  ICD-9-CM: V67.59     Acute on chronic urinary retention     ICD-10-CM: R33.9  ICD-9-CM: 788.29     Nodule of lower lobe of left lung     ICD-10-CM: R91.1  ICD-9-CM: 793.11     Physical deconditioning     ICD-10-CM: R53.81  ICD-9-CM: 799.3     Chronic gout without tophus, unspecified cause, unspecified site     ICD-10-CM: M1A.9XX0  ICD-9-CM: 274.02     Memory loss     ICD-10-CM: R41.3  ICD-9-CM: 780.93     Reactive depression     ICD-10-CM: F32.9  ICD-9-CM: 300.4     Gastroesophageal reflux disease with esophagitis without hemorrhage     ICD-10-CM:  K21.00  ICD-9-CM: 530.81, 530.10     Essential hypertension     ICD-10-CM: I10  ICD-9-CM: 401.9     Mild cognitive impairment     ICD-10-CM: G31.84  ICD-9-CM: 331.83     Type 2 diabetes mellitus with stage 3a chronic kidney disease, without long-term current use of insulin     ICD-10-CM: E11.22, N18.31  ICD-9-CM: 250.40, 585.3     Heart failure with mildly reduced ejection fraction (HFmrEF)     ICD-10-CM: I50.22  ICD-9-CM: 428.22           Patient is doing very well following his hospital stay.  He appears euvolemic on exam and his blood pressure and heart rate are in the normal range.  He has followed up with urology and cardiology and has an appointment to follow-up with the pulmonary nodule clinic this afternoon.  At this time I do not see any need for changes in his prescription medications.  Okay to increase his melatonin to 10 mg nightly for insomnia.  Agree with pursuing physical therapy via home health, daughter to contact clinic if they need further orders.  We reviewed his most recent lab testing in the hospital which showed stable kidney function electrolytes, stable borderline macrocytic anemia.  No need for repeat labs at this time.  Recommend follow-up with PCP in 6 to 8 weeks.         I have spent a total of 42 min on reviewing test results/preparing to see patient, counseling patient, performing medically appropriate exam and documenting clinical information in the electronic or other health record     Benja Campbell MD

## 2025-01-03 NOTE — Clinical Note
[FreeTextEntry1] : 12-year-old female with toe walking and Achilles contracture.  The history was obtained today from the child and parent; given the patient's age, the history was unreliable, and the parent was used as an independent historian.  Clinical findings and diagnosis were discussed at length with family. The natural history of the above condition was discussed.  Clinically, she has increased tightness and continues to be unable to walk flat even after home stretches.  I am continuing to recommend bilateral hinged AFOs to encourage a heel-toe gait and to stretch the Achilles tendons.  In addition, she will continue physical therapy for Achilles tightness. If she is unable to obtain braces operative intervention may be required. We also discussed about gastrocnemius recession for further management.  There are no orthopedic activity restrictions at this time.  We will plan to see her back in clinic approximately 2 months for repeat reevaluation.  All questions and concerns were addressed today. Parent and patient verbalize understanding and agree with plan of care.  I, Fatuma Valentino, have acted as a scribe and documented the above information for Dr. Jauregui  with ultrasound guidance into the right femoral vein. Sheath insertion not delayed.

## 2025-01-10 ENCOUNTER — LAB (OUTPATIENT)
Facility: HOSPITAL | Age: 89
End: 2025-01-10
Payer: MEDICARE

## 2025-01-10 DIAGNOSIS — I48.21 PERMANENT ATRIAL FIBRILLATION: Chronic | ICD-10-CM

## 2025-01-10 PROCEDURE — 36415 COLL VENOUS BLD VENIPUNCTURE: CPT

## 2025-01-10 PROCEDURE — 80162 ASSAY OF DIGOXIN TOTAL: CPT

## 2025-01-10 PROCEDURE — 80048 BASIC METABOLIC PNL TOTAL CA: CPT

## 2025-01-11 LAB
ANION GAP SERPL CALCULATED.3IONS-SCNC: 8.5 MMOL/L (ref 5–15)
BUN SERPL-MCNC: 19 MG/DL (ref 8–23)
BUN/CREAT SERPL: 19.2 (ref 7–25)
CALCIUM SPEC-SCNC: 9.4 MG/DL (ref 8.6–10.5)
CHLORIDE SERPL-SCNC: 100 MMOL/L (ref 98–107)
CO2 SERPL-SCNC: 32.5 MMOL/L (ref 22–29)
CREAT SERPL-MCNC: 0.99 MG/DL (ref 0.76–1.27)
DIGOXIN SERPL-MCNC: 0.9 NG/ML (ref 0.6–1.2)
EGFRCR SERPLBLD CKD-EPI 2021: 73.3 ML/MIN/1.73
GLUCOSE SERPL-MCNC: 145 MG/DL (ref 65–99)
POTASSIUM SERPL-SCNC: 3.7 MMOL/L (ref 3.5–5.2)
SODIUM SERPL-SCNC: 141 MMOL/L (ref 136–145)

## 2025-01-27 ENCOUNTER — OUTSIDE FACILITY SERVICE (OUTPATIENT)
Dept: INTERNAL MEDICINE | Facility: CLINIC | Age: 89
End: 2025-01-27
Payer: MEDICARE

## 2025-02-07 RX ORDER — DIGOXIN 125 MCG
125 TABLET ORAL DAILY
Qty: 30 TABLET | Refills: 11 | Status: SHIPPED | OUTPATIENT
Start: 2025-02-07

## 2025-02-17 ENCOUNTER — OFFICE VISIT (OUTPATIENT)
Dept: INTERNAL MEDICINE | Facility: CLINIC | Age: 89
End: 2025-02-17
Payer: MEDICARE

## 2025-02-17 VITALS
SYSTOLIC BLOOD PRESSURE: 112 MMHG | DIASTOLIC BLOOD PRESSURE: 78 MMHG | HEART RATE: 88 BPM | TEMPERATURE: 97.4 F | BODY MASS INDEX: 26.87 KG/M2 | RESPIRATION RATE: 16 BRPM | WEIGHT: 215 LBS

## 2025-02-17 DIAGNOSIS — N18.31 TYPE 2 DIABETES MELLITUS WITH STAGE 3A CHRONIC KIDNEY DISEASE, WITHOUT LONG-TERM CURRENT USE OF INSULIN: ICD-10-CM

## 2025-02-17 DIAGNOSIS — K21.00 GASTROESOPHAGEAL REFLUX DISEASE WITH ESOPHAGITIS WITHOUT HEMORRHAGE: ICD-10-CM

## 2025-02-17 DIAGNOSIS — F32.9 REACTIVE DEPRESSION: ICD-10-CM

## 2025-02-17 DIAGNOSIS — G31.84 MILD COGNITIVE IMPAIRMENT: ICD-10-CM

## 2025-02-17 DIAGNOSIS — E78.5 HYPERLIPIDEMIA LDL GOAL <100: ICD-10-CM

## 2025-02-17 DIAGNOSIS — R31.9 HEMATURIA, UNSPECIFIED TYPE: ICD-10-CM

## 2025-02-17 DIAGNOSIS — I48.21 PERMANENT ATRIAL FIBRILLATION: ICD-10-CM

## 2025-02-17 DIAGNOSIS — I10 ESSENTIAL HYPERTENSION: Primary | ICD-10-CM

## 2025-02-17 DIAGNOSIS — E11.22 TYPE 2 DIABETES MELLITUS WITH STAGE 3A CHRONIC KIDNEY DISEASE, WITHOUT LONG-TERM CURRENT USE OF INSULIN: ICD-10-CM

## 2025-02-17 LAB
ALBUMIN SERPL-MCNC: 3.2 G/DL (ref 3.5–5.2)
ALBUMIN/CREATININE RATIO, URINE: >300
ALBUMIN/GLOB SERPL: 0.8 G/DL
ALP SERPL-CCNC: 170 U/L (ref 39–117)
ALT SERPL W P-5'-P-CCNC: 15 U/L (ref 1–41)
ANION GAP SERPL CALCULATED.3IONS-SCNC: 6.6 MMOL/L (ref 5–15)
AST SERPL-CCNC: 29 U/L (ref 1–40)
BILIRUB BLD-MCNC: NEGATIVE MG/DL
BILIRUB SERPL-MCNC: 1 MG/DL (ref 0–1.2)
BUN SERPL-MCNC: 21 MG/DL (ref 8–23)
BUN/CREAT SERPL: 20.8 (ref 7–25)
CALCIUM SPEC-SCNC: 9.4 MG/DL (ref 8.6–10.5)
CHLORIDE SERPL-SCNC: 102 MMOL/L (ref 98–107)
CHOLEST SERPL-MCNC: 108 MG/DL (ref 0–200)
CLARITY, POC: ABNORMAL
CO2 SERPL-SCNC: 31.4 MMOL/L (ref 22–29)
COLOR UR: ABNORMAL
CREAT SERPL-MCNC: 1.01 MG/DL (ref 0.76–1.27)
EGFRCR SERPLBLD CKD-EPI 2021: 71.5 ML/MIN/1.73
EXPIRATION DATE: ABNORMAL
EXPIRATION DATE: NORMAL
GLOBULIN UR ELPH-MCNC: 4.1 GM/DL
GLUCOSE SERPL-MCNC: 82 MG/DL (ref 65–99)
GLUCOSE UR STRIP-MCNC: NEGATIVE MG/DL
HDLC SERPL-MCNC: 32 MG/DL (ref 40–60)
KETONES UR QL: NEGATIVE
LDLC SERPL CALC-MCNC: 52 MG/DL (ref 0–100)
LDLC/HDLC SERPL: 1.54 {RATIO}
LEUKOCYTE EST, POC: ABNORMAL
Lab: ABNORMAL
Lab: NORMAL
NITRITE UR-MCNC: NEGATIVE MG/ML
PH UR: 6 [PH] (ref 5–8)
POC CREATININE URINE: 100
POC MICROALBUMIN URINE: 150
POTASSIUM SERPL-SCNC: 4.6 MMOL/L (ref 3.5–5.2)
PROT SERPL-MCNC: 7.3 G/DL (ref 6–8.5)
PROT UR STRIP-MCNC: ABNORMAL MG/DL
RBC # UR STRIP: ABNORMAL /UL
SODIUM SERPL-SCNC: 140 MMOL/L (ref 136–145)
SP GR UR: 1.02 (ref 1–1.03)
TRIGL SERPL-MCNC: 134 MG/DL (ref 0–150)
UROBILINOGEN UR QL: NORMAL
VLDLC SERPL-MCNC: 24 MG/DL (ref 5–40)

## 2025-02-17 PROCEDURE — 81015 MICROSCOPIC EXAM OF URINE: CPT | Performed by: INTERNAL MEDICINE

## 2025-02-17 PROCEDURE — 85025 COMPLETE CBC W/AUTO DIFF WBC: CPT | Performed by: INTERNAL MEDICINE

## 2025-02-17 PROCEDURE — 1126F AMNT PAIN NOTED NONE PRSNT: CPT | Performed by: INTERNAL MEDICINE

## 2025-02-17 PROCEDURE — 99214 OFFICE O/P EST MOD 30 MIN: CPT | Performed by: INTERNAL MEDICINE

## 2025-02-17 PROCEDURE — 36415 COLL VENOUS BLD VENIPUNCTURE: CPT | Performed by: INTERNAL MEDICINE

## 2025-02-17 PROCEDURE — 82044 UR ALBUMIN SEMIQUANTITATIVE: CPT | Performed by: INTERNAL MEDICINE

## 2025-02-17 PROCEDURE — 83036 HEMOGLOBIN GLYCOSYLATED A1C: CPT | Performed by: INTERNAL MEDICINE

## 2025-02-17 PROCEDURE — 87086 URINE CULTURE/COLONY COUNT: CPT | Performed by: INTERNAL MEDICINE

## 2025-02-17 PROCEDURE — 80053 COMPREHEN METABOLIC PANEL: CPT | Performed by: INTERNAL MEDICINE

## 2025-02-17 PROCEDURE — 81003 URINALYSIS AUTO W/O SCOPE: CPT | Performed by: INTERNAL MEDICINE

## 2025-02-17 PROCEDURE — 80061 LIPID PANEL: CPT | Performed by: INTERNAL MEDICINE

## 2025-02-17 NOTE — PROGRESS NOTES
Chief Complaint   Patient presents with    4 month f/u chronic medical problems       History of Present Illness   The patient presents for a follow-up related to hypertension. The patient reports that he has had no headaches, chest pain, dyspnea, edema, syncope, blurred vision or palpitations. He states that he is taking his medication as prescribed. He is not having medication side effects.     Patient presents for GERD follow up. Denies symptoms of reflux, heartburn, and nausea, regurgitation, and chest pain. Controlled on pantoprazole, denies side effects of diarrhea, vomiting, dark urine, abdominal pain, melena, and bloating. He is taking omeprazole as prescribed, denies medication side effects.    Follow up for reactive depression. He denies currently having depression symptoms. He denies suicidal ideation. His energy level is good. He admits sleep is well.     The patient presents for a follow-up related to Type 2 Diabetes Mellitus. He does not check his blood sugars at home. He denies hypoglycemic symptoms. The patient denies polyuria, polydypsia or polyphagia. He reports no symptoms of neuropathy. The patient takes his medication as prescribed. He is not taking insulin. The patient does check his feet daily at home.     The patient presents for a follow-up related to hyperlipidemia. He is not following a low fat diet. He reports that he is not exercising. He is taking pravastatin. The patient is taking his medication as prescribed. He reports no medication side effects, including muscle cramps, abdominal pain, headaches or weakness. He denies orthopnea, paroxysmal nocturnal dyspnea or dyspnea on exertion.     The patient presents for a follow-up related to chronic atrial fibrillation. He denies palpitations. The patient denies fatigue, dizziness, nausea or exercise intolerance.     Medications      Current Outpatient Medications:     albuterol sulfate  (90 Base) MCG/ACT inhaler, Inhale 2 puffs Every  4 (Four) Hours As Needed for Wheezing., Disp: 54 g, Rfl: 1    allopurinol (ZYLOPRIM) 300 MG tablet, Take 1 tablet by mouth Daily., Disp: 90 tablet, Rfl: 1    ascorbic acid (VITAMIN C) 1000 MG tablet, Take 1 tablet by mouth Daily. (Patient taking differently: Take 1 tablet by mouth 2 (Two) Times a Day.), Disp: , Rfl:     aspirin 81 MG EC tablet, Take 1 tablet by mouth Daily., Disp: , Rfl:     Coenzyme Q10 300 MG capsule, Take 1 capsule by mouth Every Night., Disp: , Rfl:     digoxin (LANOXIN) 125 MCG tablet, Take 1 tablet by mouth Daily., Disp: 30 tablet, Rfl: 11    docusate sodium (COLACE) 100 MG capsule, Take 2 capsules by mouth Daily., Disp: , Rfl:     donepezil (ARICEPT) 10 MG tablet, Take 1 tablet by mouth Every Night., Disp: 90 tablet, Rfl: 1    Ferrous Gluconate (IRON) 240 (27 FE) MG tablet, Take 1 tablet by mouth Daily., Disp: , Rfl:     finasteride (PROSCAR) 5 MG tablet, Take 1 tablet by mouth every night at bedtime., Disp: 90 tablet, Rfl: 1    furosemide (LASIX) 20 MG tablet, Take 1 tablet by mouth Daily., Disp: 90 tablet, Rfl: 1    melatonin 5 MG tablet tablet, Take 1 tablet by mouth At Night As Needed (insomnia / sleep)., Disp: , Rfl:     memantine (NAMENDA) 10 MG tablet, Take 1 tablet by mouth 2 (Two) Times a Day., Disp: 180 tablet, Rfl: 1    metFORMIN (GLUCOPHAGE) 500 MG tablet, Take 1 tablet by mouth 2 (Two) Times a Day With Meals., Disp: 189 tablet, Rfl: 1    methenamine (HIPREX) 1 g tablet, Take 1 tablet by mouth 2 (Two) Times a Day With Meals., Disp: 180 tablet, Rfl: 1    metoprolol tartrate (LOPRESSOR) 50 MG tablet, Take 1 tablet by mouth Every 12 (Twelve) Hours., Disp: 120 tablet, Rfl: 5    multivitamin with minerals (MULTIVITAMIN MEN 50+ PO), Take 1 tablet by mouth Every Night. Centrum Sliver, Disp: , Rfl:     omeprazole (priLOSEC) 40 MG capsule, Take 1 capsule by mouth Every Morning Before Breakfast., Disp: 90 capsule, Rfl: 1    ondansetron (ZOFRAN) 4 MG tablet, Take 1 tablet by mouth Every 6  (Six) Hours As Needed for Nausea or Vomiting., Disp: , Rfl:     pravastatin (PRAVACHOL) 40 MG tablet, Take 1 tablet by mouth Every Night., Disp: , Rfl:     Probiotic Product (PROBIOTIC ADVANCED PO), Take 1 tablet by mouth 2 (Two) Times a Day., Disp: , Rfl:     sertraline (ZOLOFT) 50 MG tablet, Take 1 tablet by mouth Daily., Disp: 90 tablet, Rfl: 1    tiotropium bromide-olodaterol (STIOLTO RESPIMAT) 2.5-2.5 MCG/ACT aerosol solution inhaler, Inhale 2 puffs Daily. 2 inh once a day, Disp: 3 each, Rfl: 3     Allergies    Allergies   Allergen Reactions    Sglt2 Inhibitors Other (See Comments)     Recurrent UTI    Xarelto [Rivaroxaban] GI Bleeding     GI bleed    Penicillins Hives     Has tolerated cefepime, ceftriaxone, cefazolin, cefdinir, cefuroxime, cephalexin       Problem List    Patient Active Problem List   Diagnosis    Type 2 diabetes mellitus with stage 3 chronic kidney disease, without long-term current use of insulin    Gastroesophageal reflux disease with esophagitis    Hyperlipidemia LDL goal <100    Essential hypertension    Nephrolithiasis    Obstructive sleep apnea syndrome    Permanent atrial fibrillation    Stage 3 chronic kidney disease    Coronary artery disease involving native coronary artery of native heart without angina pectoris    Nodule of lower lobe of left lung    H/O: GI bleed    Presence of Watchman left atrial appendage closure device    Heart failure with mildly reduced ejection fraction (HFmrEF)    Obesity    Severe malnutrition    Acute on chronic urinary retention    Hydroureteronephrosis    Urethral stricture       Medications, Allergies, Problems List and Past History were reviewed and updated.    Physical Examination    /78 (BP Location: Right arm, Patient Position: Sitting, Cuff Size: Adult)   Pulse 88   Temp 97.4 °F (36.3 °C) (Infrared)   Resp 16   Wt 97.5 kg (215 lb)   BMI 26.87 kg/m²    Physical Exam  Constitutional:       General: He is not in acute distress.      Appearance: He is not ill-appearing, toxic-appearing or diaphoretic.   Cardiovascular:      Rate and Rhythm: Normal rate and regular rhythm.      Pulses: Normal pulses.      Heart sounds: Normal heart sounds. No murmur heard.     No friction rub. No gallop.   Pulmonary:      Effort: Pulmonary effort is normal. No respiratory distress.      Breath sounds: Normal breath sounds. No stridor. No wheezing, rhonchi or rales.   Chest:      Chest wall: No tenderness.   Neurological:      General: No focal deficit present.      Mental Status: He is alert.      Cranial Nerves: No cranial nerve deficit.      Sensory: No sensory deficit.      Motor: No weakness.      Coordination: Coordination normal.      Gait: Gait normal.      Deep Tendon Reflexes: Reflexes normal.   Psychiatric:         Mood and Affect: Mood normal.         Behavior: Behavior normal.         Thought Content: Thought content normal.         Judgment: Judgment normal.        Diagnoses and all orders for this visit:    1. Essential hypertension (Primary)  -     CBC & Differential; Future  -     Comprehensive Metabolic Panel; Future  -     POC Urinalysis Dipstick, Automated; Future  -     CBC & Differential  -     Comprehensive Metabolic Panel    2. Gastroesophageal reflux disease with esophagitis without hemorrhage    3. Reactive depression    4. Mild cognitive impairment    5. Type 2 diabetes mellitus with stage 3a chronic kidney disease, without long-term current use of insulin  -     Comprehensive Metabolic Panel; Future  -     Hemoglobin A1c; Future  -     POC Microalbumin; Future  -     Comprehensive Metabolic Panel  -     Hemoglobin A1c    6. Hyperlipidemia LDL goal <100  -     Comprehensive Metabolic Panel; Future  -     Lipid Panel; Future  -     Comprehensive Metabolic Panel  -     Lipid Panel    7. Permanent atrial fibrillation  -     Comprehensive Metabolic Panel; Future  -     Comprehensive Metabolic Panel    Continue on prescribed medications.  Follow up in 4 months. Discussed monitoring weight by taking weights 3-4 times per week, if weight loss is >5lbs, contact the office.    Attending Note:   Patient was personally seen and examined by me.  I have obtained and corroborated the history and examination as documented above, and agree with the accuracy of the documented information.  All orders were reviewed and personally signed by me.   Pito Way MD  16:28 EST  02/17/25

## 2025-02-18 LAB
BACTERIA UR QL AUTO: ABNORMAL /HPF
BASOPHILS # BLD AUTO: 0.07 10*3/MM3 (ref 0–0.2)
BASOPHILS NFR BLD AUTO: 1 % (ref 0–1.5)
COD CRY URNS QL: PRESENT /HPF
DEPRECATED RDW RBC AUTO: 52.1 FL (ref 37–54)
EOSINOPHIL # BLD AUTO: 0.45 10*3/MM3 (ref 0–0.4)
EOSINOPHIL NFR BLD AUTO: 6.1 % (ref 0.3–6.2)
ERYTHROCYTE [DISTWIDTH] IN BLOOD BY AUTOMATED COUNT: 15.5 % (ref 12.3–15.4)
HBA1C MFR BLD: 5.8 % (ref 4.8–5.6)
HCT VFR BLD AUTO: 44.9 % (ref 37.5–51)
HGB BLD-MCNC: 14.1 G/DL (ref 13–17.7)
HYALINE CASTS UR QL AUTO: ABNORMAL /LPF
IMM GRANULOCYTES # BLD AUTO: 0.04 10*3/MM3 (ref 0–0.05)
IMM GRANULOCYTES NFR BLD AUTO: 0.5 % (ref 0–0.5)
LYMPHOCYTES # BLD AUTO: 1.4 10*3/MM3 (ref 0.7–3.1)
LYMPHOCYTES NFR BLD AUTO: 19.1 % (ref 19.6–45.3)
MCH RBC QN AUTO: 29.1 PG (ref 26.6–33)
MCHC RBC AUTO-ENTMCNC: 31.4 G/DL (ref 31.5–35.7)
MCV RBC AUTO: 92.6 FL (ref 79–97)
MONOCYTES # BLD AUTO: 0.55 10*3/MM3 (ref 0.1–0.9)
MONOCYTES NFR BLD AUTO: 7.5 % (ref 5–12)
NEUTROPHILS NFR BLD AUTO: 4.83 10*3/MM3 (ref 1.7–7)
NEUTROPHILS NFR BLD AUTO: 65.8 % (ref 42.7–76)
NRBC BLD AUTO-RTO: 0 /100 WBC (ref 0–0.2)
PLATELET # BLD AUTO: 180 10*3/MM3 (ref 140–450)
PMV BLD AUTO: 11.3 FL (ref 6–12)
RBC # BLD AUTO: 4.85 10*6/MM3 (ref 4.14–5.8)
RBC # UR STRIP: ABNORMAL /HPF
REF LAB TEST METHOD: ABNORMAL
SQUAMOUS #/AREA URNS HPF: ABNORMAL /HPF
WBC # UR STRIP: ABNORMAL /HPF
WBC CLUMPS # UR AUTO: PRESENT /HPF
WBC NRBC COR # BLD AUTO: 7.34 10*3/MM3 (ref 3.4–10.8)

## 2025-02-19 LAB — BACTERIA SPEC AEROBE CULT: NORMAL

## 2025-02-26 ENCOUNTER — TELEPHONE (OUTPATIENT)
Dept: INTERNAL MEDICINE | Facility: CLINIC | Age: 89
End: 2025-02-26
Payer: MEDICARE

## 2025-02-26 ENCOUNTER — HOSPITAL ENCOUNTER (EMERGENCY)
Facility: HOSPITAL | Age: 89
Discharge: HOME | End: 2025-02-26
Attending: STUDENT IN AN ORGANIZED HEALTH CARE EDUCATION/TRAINING PROGRAM
Payer: MEDICARE

## 2025-02-26 VITALS
SYSTOLIC BLOOD PRESSURE: 142 MMHG | RESPIRATION RATE: 18 BRPM | TEMPERATURE: 97.9 F | OXYGEN SATURATION: 94 % | HEART RATE: 96 BPM | WEIGHT: 228.3 LBS | DIASTOLIC BLOOD PRESSURE: 92 MMHG | HEIGHT: 75 IN | BODY MASS INDEX: 28.39 KG/M2

## 2025-02-26 DIAGNOSIS — R04.0 EPISTAXIS: Primary | ICD-10-CM

## 2025-02-26 PROCEDURE — 99283 EMERGENCY DEPT VISIT LOW MDM: CPT

## 2025-02-26 RX ORDER — CEFDINIR 300 MG/1
300 CAPSULE ORAL 2 TIMES DAILY
Qty: 14 CAPSULE | Refills: 0 | Status: SHIPPED | OUTPATIENT
Start: 2025-02-26 | End: 2025-03-03

## 2025-02-26 RX ORDER — OXYMETAZOLINE HYDROCHLORIDE 0.05 G/100ML
1 SPRAY NASAL ONCE
Status: COMPLETED | OUTPATIENT
Start: 2025-02-26 | End: 2025-02-26

## 2025-02-26 RX ORDER — TRANEXAMIC ACID 100 MG/ML
500 INJECTION, SOLUTION INTRAVENOUS ONCE
Status: COMPLETED | OUTPATIENT
Start: 2025-02-26 | End: 2025-02-26

## 2025-02-26 RX ORDER — LIDOCAINE HYDROCHLORIDE 20 MG/ML
JELLY TOPICAL ONCE
Status: COMPLETED | OUTPATIENT
Start: 2025-02-26 | End: 2025-02-26

## 2025-02-26 RX ADMIN — TRANEXAMIC ACID 500 MG: 100 INJECTION, SOLUTION INTRAVENOUS at 13:00

## 2025-02-26 RX ADMIN — Medication 1 SPRAY: at 11:21

## 2025-02-26 RX ADMIN — LIDOCAINE HYDROCHLORIDE: 20 JELLY TOPICAL at 13:00

## 2025-02-26 NOTE — FSED PROVIDER NOTE
Subjective  History of Present Illness:    Patient is a 89-year-old male presenting to the emergency department with a nosebleed.  He states spontaneous nosebleed started around 4 AM this morning and has been persistent.  EMS was called and he was given TXA en route.  Upon arrival to ED, bleeding is controlled.  He states he has had multiple frequent nosebleeds in the past and has followed up with ENT with 2 previous cauterizations.  Does not take blood thinners.  He denies associated headache, lightheadedness.    Nurses Notes reviewed and agree, including vitals, allergies, social history and prior medical history.     REVIEW OF SYSTEMS: All systems reviewed and not pertinent unless noted.  Review of Systems   HENT:  Positive for nosebleeds.    All other systems reviewed and are negative.      Past Medical History:   Diagnosis Date    Arrhythmia     Atrial fibrillation     Chronic kidney disease     COPD (chronic obstructive pulmonary disease)     Coronary artery disease     Dementia     Diabetes mellitus     doesnt check sugar    E. coli sepsis 06/23/2022    Enlarged prostate without lower urinary tract symptoms (luts) 06/20/2016    Full dentures     GERD (gastroesophageal reflux disease)     Gout     Hearing aid worn     bilat prn    History of colonoscopy 09/12/2012    History of radiation therapy 02/24/2023    SBRT LLL lung    [place] (hard of hearing)     hearing aids prn    Hyperlipidemia     Hypertension     Kidney stone     surgery x1    Lung cancer     STEVEN on CPAP     compliant with machine    PAF (paroxysmal atrial fibrillation)     Prostatism     Urinary incontinence     Urinary tract infection     Wears glasses     readers       Allergies:    Sglt2 inhibitors, Xarelto [rivaroxaban], and Penicillins      Past Surgical History:   Procedure Laterality Date    ATRIAL APPENDAGE EXCLUSION LEFT WITH TRANSESOPHAGEAL ECHOCARDIOGRAM N/A 11/28/2023    Procedure: Atrial Appendage Occlusion;  Surgeon: Anthony Mcdaniel MD;   Location:  [facility];  Service: Cardiovascular;  Laterality: N/A;    CARDIAC CATHETERIZATION Left 2022    Procedure: Left Heart Cath;  Surgeon: Titus Oliveros IV, MD;  Location:  [facility];  Service: Cardiovascular;  Laterality: Left;    CARDIOVERSION      CATARACT EXTRACTION Bilateral     COLONOSCOPY      CYSTOSCOPY      ENDOSCOPY      possible    ENDOSCOPY N/A 2024    Procedure: ESOPHAGOGASTRODUODENOSCOPY;  Surgeon: Anthony Arambula MD;  Location:  [facility];  Service: Gastroenterology;  Laterality: N/A;  Esophagus dilated to 18mm with 12mm-mm to 15mm, then 15mm to 18mm balloon.    ENDOSCOPY N/A 10/31/2024    Procedure: ESOPHAGOGASTRODUODENOSCOPY WITH BIOPSY;  Surgeon: Carlos Dior MD;  Location:  [facility];  Service: Gastroenterology;  Laterality: N/A;    ERCP N/A 2024    Procedure: ENDOSCOPIC RETROGRADE CHOLANGIOPANCREATOGRAPHY;  Surgeon: Anthony Arambula MD;  Location:  [facility];  Service: Gastroenterology;  Laterality: N/A;  Sphincterotomy made to ampula of common bile duct (CBD), CBD swept with 9mm-12mm balloon - sludge, stones and purulent appearing drainage extracted. 10x7 Guamanian biliary stent depolyed into CBD. ERCP scope removed with balloon intact.    ERCP N/A 10/31/2024    Procedure: ENDOSCOPIC RETROGRADE CHOLANGIOPANCREATOGRAPHY;  Surgeon: Carlos Dior MD;  Location:  [facility];  Service: Gastroenterology;  Laterality: N/A;    KIDNEY STONE SURGERY      x1         Social History     Socioeconomic History    Marital status:    Tobacco Use    Smoking status: Former     Current packs/day: 0.00     Average packs/day: 1 pack/day for 15.0 years (15.0 ttl pk-yrs)     Types: Cigarettes     Start date: 1947     Quit date: 1960     Years since quittin.8     Passive exposure: Past    Smokeless tobacco: Former     Types: Chew     Quit date:     Tobacco comments:     started at  "15 yo.   Vaping Use    Vaping status: Never Used    Passive vaping exposure: Yes   Substance and Sexual Activity    Alcohol use: No    Drug use: Never    Sexual activity: Not Currently     Partners: Female         Family History   Problem Relation Age of Onset    Alzheimer's disease Mother     COPD Father     Lung cancer Father     Cancer Father     Kidney disease Father     No Known Problems Daughter     No Known Problems Daughter     No Known Problems Daughter        Objective  Physical Exam:  /92   Pulse 96   Temp 97.9 °F (36.6 °C) (Oral)   Resp 18   Ht 190.5 cm (75\")   Wt 104 kg (228 lb 4.8 oz)   SpO2 94%   BMI 28.54 kg/m²      Physical Exam  Vitals and nursing note reviewed.   Constitutional:       Appearance: Normal appearance. He is normal weight.   HENT:      Head: Normocephalic and atraumatic.      Nose:      Left Nostril: Epistaxis present.      Comments: Clotted blood noted in left nare     Mouth/Throat:      Comments: Blood clot noted in posterior pharynx  Eyes:      Extraocular Movements: Extraocular movements intact.      Pupils: Pupils are equal, round, and reactive to light.   Cardiovascular:      Rate and Rhythm: Normal rate.   Pulmonary:      Effort: Pulmonary effort is normal.   Musculoskeletal:         General: Normal range of motion.      Cervical back: Normal range of motion.   Skin:     General: Skin is warm and dry.   Neurological:      General: No focal deficit present.      Mental Status: He is alert and oriented to person, place, and time. Mental status is at baseline.   Psychiatric:         Mood and Affect: Mood normal.         Behavior: Behavior normal.         Thought Content: Thought content normal.         Judgment: Judgment normal.         Procedures    ED Course:    ED Course as of 02/26/25 2242 Wed Feb 26, 2025 2241 Patient evaluated for epistaxis.  He was given TXA and route and bleeding had temporarily resolved.  After removal of a large clot, bleeding became " persistent despite nasal clamp and aspirin.  Nasal packing was placed with TXA.  I scheduled the patient for outpatient follow-up with his ENT on 3/3 at the earliest available appointment. [JJ]      ED Course User Index  [JJ] Donavan Campos PA-C       Lab Results (last 24 hours)       ** No results found for the last 24 hours. **             No radiology results from the last 24 hrs       MDM        DDX: includes but is not limited to: Spontaneous epistaxis    Patient arrives EMS with vitals interpreted by myself.     Pertinent features from physical exam: Clotted blood noted in left nare.    Interventions / Re-evaluation: Patient given TXA en route, bleeding controlled with nasal clamp in place    Medications   oxymetazoline (AFRIN) nasal spray 1 spray (1 spray Nasal Given 2/26/25 1121)   tranexamic acid 500 MG/5ML nasal (ED/Crit Care for epistaxis) - VIAL DISPENSE 500 mg (500 mg Nasal Given by Other 2/26/25 1300)   Lidocaine HCl gel (XYLOCAINE) urethral/mucosal syringe ( Topical Given by Other 2/26/25 1300)       Results/clinical rationale were discussed with patient        -----  ED Disposition       ED Disposition   Discharge    Condition   Stable    Comment   --             Final diagnoses:   Epistaxis      Your Follow-Up Providers       Dani Bueno MD On 3/3/2025.    Specialty: Otolaryngology  Follow up details: appt scheduled for 9am  [address] [address]  [phone]                       Contact information for after-discharge care    Follow-up information has not been specified.                    Your medication list        START taking these medications        Instructions Last Dose Given Next Dose Due   cefdinir 300 MG capsule  Commonly known as: OMNICEF      Take 1 capsule by mouth 2 (Two) Times a Day.              CHANGE how you take these medications        Instructions Last Dose Given Next Dose Due   ascorbic acid 1000 MG tablet  Commonly known as: VITAMIN  C  What changed: when to take this      Take 1 tablet by mouth Daily.              CONTINUE taking these medications        Instructions Last Dose Given Next Dose Due   albuterol sulfate  (90 Base) MCG/ACT inhaler  Commonly known as: PROVENTIL HFA;VENTOLIN HFA;PROAIR HFA      Inhale 2 puffs Every 4 (Four) Hours As Needed for Wheezing.       allopurinol 300 MG tablet  Commonly known as: ZYLOPRIM      Take 1 tablet by mouth Daily.       aspirin 81 MG EC tablet      Take 1 tablet by mouth Daily.       Coenzyme Q10 300 MG capsule      Take 1 capsule by mouth Every Night.       digoxin 125 MCG tablet  Commonly known as: LANOXIN      Take 1 tablet by mouth Daily.       docusate sodium 100 MG capsule  Commonly known as: COLACE      Take 2 capsules by mouth Daily.       donepezil 10 MG tablet  Commonly known as: ARICEPT      Take 1 tablet by mouth Every Night.       finasteride 5 MG tablet  Commonly known as: PROSCAR      Take 1 tablet by mouth every night at bedtime.       furosemide 20 MG tablet  Commonly known as: LASIX      Take 1 tablet by mouth Daily.       Iron 240 (27 Fe) MG tablet      Take 1 tablet by mouth Daily.       melatonin 5 MG tablet tablet      Take 1 tablet by mouth At Night As Needed (insomnia / sleep).       memantine 10 MG tablet  Commonly known as: NAMENDA      Take 1 tablet by mouth 2 (Two) Times a Day.       metFORMIN 500 MG tablet  Commonly known as: GLUCOPHAGE      Take 1 tablet by mouth 2 (Two) Times a Day With Meals.       methenamine 1 g tablet  Commonly known as: HIPREX      Take 1 tablet by mouth 2 (Two) Times a Day With Meals.       metoprolol tartrate 50 MG tablet  Commonly known as: LOPRESSOR      Take 1 tablet by mouth Every 12 (Twelve) Hours.       multivitamin with minerals tablet tablet      Take 1 tablet by mouth Every Night. Centrum Sliver       omeprazole 40 MG capsule  Commonly known as: priLOSEC      Take 1 capsule by mouth Every Morning Before Breakfast.       ondansetron  4 MG tablet  Commonly known as: ZOFRAN      Take 1 tablet by mouth Every 6 (Six) Hours As Needed for Nausea or Vomiting.       pravastatin 40 MG tablet  Commonly known as: PRAVACHOL      Take 1 tablet by mouth Every Night.       PROBIOTIC ADVANCED PO      Take 1 tablet by mouth 2 (Two) Times a Day.       sertraline 50 MG tablet  Commonly known as: ZOLOFT      Take 1 tablet by mouth Daily.       tiotropium bromide-olodaterol 2.5-2.5 MCG/ACT aerosol solution inhaler  Commonly known as: STIOLTO RESPIMAT      Inhale 2 puffs Daily. 2 inh once a day                 Where to Get Your Medications        These medications were sent to [facility] - [place], [place] - [address] - [phone]  - [phone] [address] [address]      Phone: [phone]   cefdinir 300 MG capsule

## 2025-02-26 NOTE — TELEPHONE ENCOUNTER
Caller: YUE    Relationship: Home Health    Best call back number: [phone]    What is the best time to reach you: ANY TIME    Who are you requesting to speak with (clinical staff, provider,  specific staff member): DR KARIMI    Do you know the name of the person who called: YUE    What was the call regarding: YUE CALLED FOR VERBAL ORDERS FOR HOME HEALTH NURSING ONCE EVERY 4 WEEKS FOR CATHETER CHANGES FOR UTI PREVENTION. URINALYSIS WAS DELIVERED YESTERDAY TO [facility] ON [address].   PLEASE CALL YUE WITH ORDERS.

## 2025-03-03 ENCOUNTER — HOSPITAL ENCOUNTER (INPATIENT)
Facility: HOSPITAL | Age: 89
LOS: 1 days | Discharge: HOME | End: 2025-03-05
Attending: EMERGENCY MEDICINE | Admitting: STUDENT IN AN ORGANIZED HEALTH CARE EDUCATION/TRAINING PROGRAM
Payer: MEDICARE

## 2025-03-03 DIAGNOSIS — R04.0 POSTERIOR EPISTAXIS: Primary | ICD-10-CM

## 2025-03-03 DIAGNOSIS — Z86.79 HISTORY OF ATRIAL FIBRILLATION: ICD-10-CM

## 2025-03-03 LAB
ABO GROUP BLD: NORMAL
ABO GROUP BLD: NORMAL
ALBUMIN SERPL-MCNC: 3 G/DL (ref 3.5–5.2)
ALBUMIN/GLOB SERPL: 0.8 G/DL
ALP SERPL-CCNC: 160 U/L (ref 39–117)
ALT SERPL W P-5'-P-CCNC: 13 U/L (ref 1–41)
ANION GAP SERPL CALCULATED.3IONS-SCNC: 10 MMOL/L (ref 5–15)
AST SERPL-CCNC: 27 U/L (ref 1–40)
BASOPHILS # BLD AUTO: 0.07 10*3/MM3 (ref 0–0.2)
BASOPHILS NFR BLD AUTO: 0.8 % (ref 0–1.5)
BILIRUB SERPL-MCNC: 0.8 MG/DL (ref 0–1.2)
BLD GP AB SCN SERPL QL: NEGATIVE
BUN SERPL-MCNC: 23 MG/DL (ref 8–23)
BUN/CREAT SERPL: 33.8 (ref 7–25)
CALCIUM SPEC-SCNC: 8.9 MG/DL (ref 8.6–10.5)
CHLORIDE SERPL-SCNC: 102 MMOL/L (ref 98–107)
CO2 SERPL-SCNC: 27 MMOL/L (ref 22–29)
CREAT SERPL-MCNC: 0.68 MG/DL (ref 0.76–1.27)
DEPRECATED RDW RBC AUTO: 58.7 FL (ref 37–54)
EGFRCR SERPLBLD CKD-EPI 2021: 89.4 ML/MIN/1.73
EOSINOPHIL # BLD AUTO: 0.28 10*3/MM3 (ref 0–0.4)
EOSINOPHIL NFR BLD AUTO: 3.2 % (ref 0.3–6.2)
ERYTHROCYTE [DISTWIDTH] IN BLOOD BY AUTOMATED COUNT: 16.9 % (ref 12.3–15.4)
GLOBULIN UR ELPH-MCNC: 3.9 GM/DL
GLUCOSE BLDC GLUCOMTR-MCNC: 101 MG/DL (ref 70–130)
GLUCOSE BLDC GLUCOMTR-MCNC: 127 MG/DL (ref 70–130)
GLUCOSE SERPL-MCNC: 121 MG/DL (ref 65–99)
HCT VFR BLD AUTO: 31.2 % (ref 37.5–51)
HCT VFR BLD AUTO: 36.9 % (ref 37.5–51)
HGB BLD-MCNC: 11.5 G/DL (ref 13–17.7)
HGB BLD-MCNC: 9.8 G/DL (ref 13–17.7)
IMM GRANULOCYTES # BLD AUTO: 0.06 10*3/MM3 (ref 0–0.05)
IMM GRANULOCYTES NFR BLD AUTO: 0.7 % (ref 0–0.5)
LYMPHOCYTES # BLD AUTO: 1.18 10*3/MM3 (ref 0.7–3.1)
LYMPHOCYTES NFR BLD AUTO: 13.4 % (ref 19.6–45.3)
MAGNESIUM SERPL-MCNC: 1.8 MG/DL (ref 1.6–2.4)
MCH RBC QN AUTO: 29.9 PG (ref 26.6–33)
MCHC RBC AUTO-ENTMCNC: 31.2 G/DL (ref 31.5–35.7)
MCV RBC AUTO: 95.8 FL (ref 79–97)
MONOCYTES # BLD AUTO: 0.53 10*3/MM3 (ref 0.1–0.9)
MONOCYTES NFR BLD AUTO: 6 % (ref 5–12)
NEUTROPHILS NFR BLD AUTO: 6.67 10*3/MM3 (ref 1.7–7)
NEUTROPHILS NFR BLD AUTO: 75.9 % (ref 42.7–76)
NRBC BLD AUTO-RTO: 0 /100 WBC (ref 0–0.2)
PLATELET # BLD AUTO: 205 10*3/MM3 (ref 140–450)
PMV BLD AUTO: 9.8 FL (ref 6–12)
POTASSIUM SERPL-SCNC: 4.5 MMOL/L (ref 3.5–5.2)
PROT SERPL-MCNC: 6.9 G/DL (ref 6–8.5)
QT INTERVAL: 360 MS
QTC INTERVAL: 462 MS
RBC # BLD AUTO: 3.85 10*6/MM3 (ref 4.14–5.8)
RH BLD: POSITIVE
RH BLD: POSITIVE
SODIUM SERPL-SCNC: 139 MMOL/L (ref 136–145)
T&S EXPIRATION DATE: NORMAL
T4 FREE SERPL-MCNC: 1.16 NG/DL (ref 0.92–1.68)
TSH SERPL DL<=0.05 MIU/L-ACNC: 2.96 UIU/ML (ref 0.27–4.2)
WBC NRBC COR # BLD AUTO: 8.79 10*3/MM3 (ref 3.4–10.8)

## 2025-03-03 PROCEDURE — 03VG3DZ RESTRICTION OF INTRACRANIAL ARTERY WITH INTRALUMINAL DEVICE, PERCUTANEOUS APPROACH: ICD-10-PCS | Performed by: RADIOLOGY

## 2025-03-03 PROCEDURE — 99285 EMERGENCY DEPT VISIT HI MDM: CPT

## 2025-03-03 PROCEDURE — 93005 ELECTROCARDIOGRAM TRACING: CPT | Performed by: EMERGENCY MEDICINE

## 2025-03-03 PROCEDURE — 36415 COLL VENOUS BLD VENIPUNCTURE: CPT

## 2025-03-03 PROCEDURE — 82948 REAGENT STRIP/BLOOD GLUCOSE: CPT

## 2025-03-03 PROCEDURE — 84443 ASSAY THYROID STIM HORMONE: CPT | Performed by: EMERGENCY MEDICINE

## 2025-03-03 PROCEDURE — 86901 BLOOD TYPING SEROLOGIC RH(D): CPT

## 2025-03-03 PROCEDURE — G0378 HOSPITAL OBSERVATION PER HR: HCPCS

## 2025-03-03 PROCEDURE — B3181ZZ FLUOROSCOPY OF BILATERAL INTERNAL CAROTID ARTERIES USING LOW OSMOLAR CONTRAST: ICD-10-PCS | Performed by: RADIOLOGY

## 2025-03-03 PROCEDURE — 86901 BLOOD TYPING SEROLOGIC RH(D): CPT | Performed by: EMERGENCY MEDICINE

## 2025-03-03 PROCEDURE — 85014 HEMATOCRIT: CPT | Performed by: STUDENT IN AN ORGANIZED HEALTH CARE EDUCATION/TRAINING PROGRAM

## 2025-03-03 PROCEDURE — 94664 DEMO&/EVAL PT USE INHALER: CPT

## 2025-03-03 PROCEDURE — 86850 RBC ANTIBODY SCREEN: CPT | Performed by: EMERGENCY MEDICINE

## 2025-03-03 PROCEDURE — 85018 HEMOGLOBIN: CPT | Performed by: STUDENT IN AN ORGANIZED HEALTH CARE EDUCATION/TRAINING PROGRAM

## 2025-03-03 PROCEDURE — 25010000002 MAGNESIUM SULFATE 4 GM/100ML SOLUTION: Performed by: STUDENT IN AN ORGANIZED HEALTH CARE EDUCATION/TRAINING PROGRAM

## 2025-03-03 PROCEDURE — 94640 AIRWAY INHALATION TREATMENT: CPT

## 2025-03-03 PROCEDURE — 99222 1ST HOSP IP/OBS MODERATE 55: CPT | Performed by: STUDENT IN AN ORGANIZED HEALTH CARE EDUCATION/TRAINING PROGRAM

## 2025-03-03 PROCEDURE — 80053 COMPREHEN METABOLIC PANEL: CPT | Performed by: EMERGENCY MEDICINE

## 2025-03-03 PROCEDURE — 85025 COMPLETE CBC W/AUTO DIFF WBC: CPT | Performed by: EMERGENCY MEDICINE

## 2025-03-03 PROCEDURE — 86900 BLOOD TYPING SEROLOGIC ABO: CPT

## 2025-03-03 PROCEDURE — 86900 BLOOD TYPING SEROLOGIC ABO: CPT | Performed by: EMERGENCY MEDICINE

## 2025-03-03 PROCEDURE — 83735 ASSAY OF MAGNESIUM: CPT | Performed by: EMERGENCY MEDICINE

## 2025-03-03 PROCEDURE — 84439 ASSAY OF FREE THYROXINE: CPT | Performed by: EMERGENCY MEDICINE

## 2025-03-03 RX ORDER — MEMANTINE HYDROCHLORIDE 10 MG/1
10 TABLET ORAL 2 TIMES DAILY
Status: DISCONTINUED | OUTPATIENT
Start: 2025-03-03 | End: 2025-03-05 | Stop reason: HOSPADM

## 2025-03-03 RX ORDER — ACETAMINOPHEN 650 MG/1
650 SUPPOSITORY RECTAL EVERY 4 HOURS PRN
Status: DISCONTINUED | OUTPATIENT
Start: 2025-03-03 | End: 2025-03-05 | Stop reason: HOSPADM

## 2025-03-03 RX ORDER — MAGNESIUM SULFATE HEPTAHYDRATE 40 MG/ML
4 INJECTION, SOLUTION INTRAVENOUS ONCE
Status: COMPLETED | OUTPATIENT
Start: 2025-03-03 | End: 2025-03-03

## 2025-03-03 RX ORDER — METOPROLOL TARTRATE 50 MG
50 TABLET ORAL EVERY 12 HOURS SCHEDULED
Status: DISCONTINUED | OUTPATIENT
Start: 2025-03-03 | End: 2025-03-05 | Stop reason: HOSPADM

## 2025-03-03 RX ORDER — ACETAMINOPHEN 325 MG/1
650 TABLET ORAL EVERY 4 HOURS PRN
Status: DISCONTINUED | OUTPATIENT
Start: 2025-03-03 | End: 2025-03-05 | Stop reason: HOSPADM

## 2025-03-03 RX ORDER — POLYETHYLENE GLYCOL 3350 17 G/17G
17 POWDER, FOR SOLUTION ORAL DAILY PRN
Status: DISCONTINUED | OUTPATIENT
Start: 2025-03-03 | End: 2025-03-05 | Stop reason: HOSPADM

## 2025-03-03 RX ORDER — FINASTERIDE 5 MG/1
5 TABLET, FILM COATED ORAL NIGHTLY
Status: DISCONTINUED | OUTPATIENT
Start: 2025-03-03 | End: 2025-03-05 | Stop reason: HOSPADM

## 2025-03-03 RX ORDER — DEXTROSE MONOHYDRATE 25 G/50ML
25 INJECTION, SOLUTION INTRAVENOUS
Status: DISCONTINUED | OUTPATIENT
Start: 2025-03-03 | End: 2025-03-05 | Stop reason: HOSPADM

## 2025-03-03 RX ORDER — AMOXICILLIN 250 MG
2 CAPSULE ORAL 2 TIMES DAILY PRN
Status: DISCONTINUED | OUTPATIENT
Start: 2025-03-03 | End: 2025-03-05 | Stop reason: HOSPADM

## 2025-03-03 RX ORDER — OMEPRAZOLE 20 MG/1
40 CAPSULE, DELAYED RELEASE ORAL EVERY MORNING
COMMUNITY

## 2025-03-03 RX ORDER — ALBUTEROL SULFATE 90 UG/1
2 INHALANT RESPIRATORY (INHALATION) EVERY 4 HOURS PRN
Status: DISCONTINUED | OUTPATIENT
Start: 2025-03-03 | End: 2025-03-05 | Stop reason: HOSPADM

## 2025-03-03 RX ORDER — ALLOPURINOL 300 MG/1
300 TABLET ORAL DAILY
Status: DISCONTINUED | OUTPATIENT
Start: 2025-03-04 | End: 2025-03-05 | Stop reason: HOSPADM

## 2025-03-03 RX ORDER — SODIUM CHLORIDE 0.9 % (FLUSH) 0.9 %
10 SYRINGE (ML) INJECTION AS NEEDED
Status: DISCONTINUED | OUTPATIENT
Start: 2025-03-03 | End: 2025-03-05 | Stop reason: HOSPADM

## 2025-03-03 RX ORDER — SODIUM CHLORIDE 9 MG/ML
40 INJECTION, SOLUTION INTRAVENOUS AS NEEDED
Status: DISCONTINUED | OUTPATIENT
Start: 2025-03-03 | End: 2025-03-05 | Stop reason: HOSPADM

## 2025-03-03 RX ORDER — PANTOPRAZOLE SODIUM 40 MG/1
40 TABLET, DELAYED RELEASE ORAL
Status: DISCONTINUED | OUTPATIENT
Start: 2025-03-04 | End: 2025-03-05 | Stop reason: HOSPADM

## 2025-03-03 RX ORDER — DIGOXIN 125 MCG
125 TABLET ORAL DAILY
Status: DISCONTINUED | OUTPATIENT
Start: 2025-03-04 | End: 2025-03-05 | Stop reason: HOSPADM

## 2025-03-03 RX ORDER — SODIUM CHLORIDE 0.9 % (FLUSH) 0.9 %
10 SYRINGE (ML) INJECTION EVERY 12 HOURS SCHEDULED
Status: DISCONTINUED | OUTPATIENT
Start: 2025-03-03 | End: 2025-03-05 | Stop reason: HOSPADM

## 2025-03-03 RX ORDER — PRAVASTATIN SODIUM 40 MG
40 TABLET ORAL NIGHTLY
Status: DISCONTINUED | OUTPATIENT
Start: 2025-03-03 | End: 2025-03-05 | Stop reason: HOSPADM

## 2025-03-03 RX ORDER — NICOTINE POLACRILEX 4 MG
15 LOZENGE BUCCAL
Status: DISCONTINUED | OUTPATIENT
Start: 2025-03-03 | End: 2025-03-05 | Stop reason: HOSPADM

## 2025-03-03 RX ORDER — BISACODYL 5 MG/1
5 TABLET, DELAYED RELEASE ORAL DAILY PRN
Status: DISCONTINUED | OUTPATIENT
Start: 2025-03-03 | End: 2025-03-05 | Stop reason: HOSPADM

## 2025-03-03 RX ORDER — DONEPEZIL HYDROCHLORIDE 10 MG/1
10 TABLET, FILM COATED ORAL NIGHTLY
Status: DISCONTINUED | OUTPATIENT
Start: 2025-03-03 | End: 2025-03-05 | Stop reason: HOSPADM

## 2025-03-03 RX ORDER — ASPIRIN 81 MG/1
81 TABLET ORAL DAILY
Status: DISCONTINUED | OUTPATIENT
Start: 2025-03-03 | End: 2025-03-05 | Stop reason: HOSPADM

## 2025-03-03 RX ORDER — IBUPROFEN 600 MG/1
1 TABLET ORAL
Status: DISCONTINUED | OUTPATIENT
Start: 2025-03-03 | End: 2025-03-05 | Stop reason: HOSPADM

## 2025-03-03 RX ORDER — ARFORMOTEROL TARTRATE 15 UG/2ML
15 SOLUTION RESPIRATORY (INHALATION)
Status: DISCONTINUED | OUTPATIENT
Start: 2025-03-03 | End: 2025-03-05 | Stop reason: HOSPADM

## 2025-03-03 RX ORDER — BISACODYL 10 MG
10 SUPPOSITORY, RECTAL RECTAL DAILY PRN
Status: DISCONTINUED | OUTPATIENT
Start: 2025-03-03 | End: 2025-03-05 | Stop reason: HOSPADM

## 2025-03-03 RX ORDER — NITROGLYCERIN 0.4 MG/1
0.4 TABLET SUBLINGUAL
Status: DISCONTINUED | OUTPATIENT
Start: 2025-03-03 | End: 2025-03-05 | Stop reason: HOSPADM

## 2025-03-03 RX ORDER — ACETAMINOPHEN 160 MG/5ML
650 SOLUTION ORAL EVERY 4 HOURS PRN
Status: DISCONTINUED | OUTPATIENT
Start: 2025-03-03 | End: 2025-03-05 | Stop reason: HOSPADM

## 2025-03-03 RX ADMIN — SENNOSIDES AND DOCUSATE SODIUM 2 TABLET: 50; 8.6 TABLET ORAL at 22:28

## 2025-03-03 RX ADMIN — DONEPEZIL HYDROCHLORIDE 10 MG: 10 TABLET, FILM COATED ORAL at 22:28

## 2025-03-03 RX ADMIN — Medication 10 ML: at 22:29

## 2025-03-03 RX ADMIN — METOPROLOL TARTRATE 50 MG: 50 TABLET, FILM COATED ORAL at 22:38

## 2025-03-03 RX ADMIN — ARFORMOTEROL TARTRATE 15 MCG: 15 SOLUTION RESPIRATORY (INHALATION) at 21:59

## 2025-03-03 RX ADMIN — AMOXICILLIN AND CLAVULANATE POTASSIUM 1 TABLET: 875; 125 TABLET, FILM COATED ORAL at 12:21

## 2025-03-03 RX ADMIN — FINASTERIDE 5 MG: 5 TABLET, FILM COATED ORAL at 22:28

## 2025-03-03 RX ADMIN — AMOXICILLIN AND CLAVULANATE POTASSIUM 1 TABLET: 875; 125 TABLET, FILM COATED ORAL at 22:28

## 2025-03-03 RX ADMIN — Medication 10 ML: at 14:27

## 2025-03-03 RX ADMIN — MAGNESIUM SULFATE IN WATER FOR 4 G: 40 INJECTION INTRAVENOUS at 14:28

## 2025-03-03 RX ADMIN — PRAVASTATIN SODIUM 40 MG: 40 TABLET ORAL at 22:28

## 2025-03-03 RX ADMIN — MEMANTINE 10 MG: 10 TABLET ORAL at 22:28

## 2025-03-03 NOTE — ED NOTES
Darien Camarena    Nursing Report ED to Floor:  Mental status: AOX4  Ambulatory status: ASSIST X 1  Oxygen Therapy:  RA  Cardiac Rhythm: AFIB  Admitted from: HOME  Safety Concerns:  FALL RISK  Precautions: NA  Social Issues: NA  ED Room #:  02    ED Nurse Phone Extension - [phone] or may call [phone].      HPI:   Chief Complaint   Patient presents with    Nose Bleed       Past Medical History:  Past Medical History:   Diagnosis Date    Arrhythmia     Atrial fibrillation     Chronic kidney disease     COPD (chronic obstructive pulmonary disease)     Coronary artery disease     Dementia     Diabetes mellitus     doesnt check sugar    E. coli sepsis 06/23/2022    Enlarged prostate without lower urinary tract symptoms (luts) 06/20/2016    Full dentures     GERD (gastroesophageal reflux disease)     Gout     Hearing aid worn     bilat prn    History of colonoscopy 09/12/2012    History of radiation therapy 02/24/2023    SBRT LLL lung    [place] (hard of hearing)     hearing aids prn    Hyperlipidemia     Hypertension     Kidney stone     surgery x1    Lung cancer     STEVEN on CPAP     compliant with machine    PAF (paroxysmal atrial fibrillation)     Prostatism     Urinary incontinence     Urinary tract infection     Wears glasses     readers        Past Surgical History:  Past Surgical History:   Procedure Laterality Date    ATRIAL APPENDAGE EXCLUSION LEFT WITH TRANSESOPHAGEAL ECHOCARDIOGRAM N/A 11/28/2023    Procedure: Atrial Appendage Occlusion;  Surgeon: Anthony Mcdaniel MD;  Location:  [facility];  Service: Cardiovascular;  Laterality: N/A;    CARDIAC CATHETERIZATION Left 09/29/2022    Procedure: Left Heart Cath;  Surgeon: Titus Oliveros IV, MD;  Location:  [facility];  Service: Cardiovascular;  Laterality: Left;    CARDIOVERSION      CATARACT EXTRACTION Bilateral     COLONOSCOPY      CYSTOSCOPY      ENDOSCOPY      possible    ENDOSCOPY N/A 9/5/2024    Procedure:  "ESOPHAGOGASTRODUODENOSCOPY;  Surgeon: nAthony Arambula MD;  Location:  [facility];  Service: Gastroenterology;  Laterality: N/A;  Esophagus dilated to 18mm with 12mm-mm to 15mm, then 15mm to 18mm balloon.    ENDOSCOPY N/A 10/31/2024    Procedure: ESOPHAGOGASTRODUODENOSCOPY WITH BIOPSY;  Surgeon: Carlos Diro MD;  Location:  [facility];  Service: Gastroenterology;  Laterality: N/A;    ERCP N/A 9/5/2024    Procedure: ENDOSCOPIC RETROGRADE CHOLANGIOPANCREATOGRAPHY;  Surgeon: Anthony Arambula MD;  Location:  [facility];  Service: Gastroenterology;  Laterality: N/A;  Sphincterotomy made to ampula of common bile duct (CBD), CBD swept with 9mm-12mm balloon - sludge, stones and purulent appearing drainage extracted. 10x7 Vietnamese biliary stent depolyed into CBD. ERCP scope removed with balloon intact.    ERCP N/A 10/31/2024    Procedure: ENDOSCOPIC RETROGRADE CHOLANGIOPANCREATOGRAPHY;  Surgeon: Carlos Dior MD;  Location:  [facility];  Service: Gastroenterology;  Laterality: N/A;    KIDNEY STONE SURGERY      x1        Admitting Doctor:   Spenser Pruitt DO    Consulting Provider(s):  Consults       No orders found from 2/2/2025 to 3/4/2025.             Admitting Diagnosis:     Most Recent Vitals:   Vitals:    03/03/25 1029   BP: 141/96   BP Location: Right arm   Patient Position: Sitting   Pulse: 86   Resp: 18   Temp: 97.8 °F (36.6 °C)   TempSrc: Oral   SpO2: 98%   Weight: 98.4 kg (217 lb)   Height: 190.5 cm (75\")       Active LDAs/IV Access:   Lines, Drains & Airways       Active LDAs       Name Placement date Placement time Site Days    Peripheral IV Anterior;Distal;Left;Upper Antecubital --  --  Antecubital  --    Urethral Catheter Non-latex;Silicone 16 Fr. 12/05/24  2130  -- 87                    Labs (abnormal labs have a star):   Labs Reviewed   COMPREHENSIVE METABOLIC PANEL - Abnormal; Notable for the following components:       Result Value    Glucose 121 (*)     Creatinine " 0.68 (*)     Albumin 3.0 (*)     Alkaline Phosphatase 160 (*)     BUN/Creatinine Ratio 33.8 (*)     All other components within normal limits    Narrative:     GFR Categories in Chronic Kidney Disease (CKD)      GFR Category          GFR (mL/min/1.73)    Interpretation  G1                     90 or greater         Normal or high (1)  G2                      60-89                Mild decrease (1)  G3a                   45-59                Mild to moderate decrease  G3b                   30-44                Moderate to severe decrease  G4                    15-29                Severe decrease  G5                    14 or less           Kidney failure          (1)In the absence of evidence of kidney disease, neither GFR category G1 or G2 fulfill the criteria for CKD.    eGFR calculation 2021 CKD-EPI creatinine equation, which does not include race as a factor   CBC WITH AUTO DIFFERENTIAL - Abnormal; Notable for the following components:    RBC 3.85 (*)     Hemoglobin 11.5 (*)     Hematocrit 36.9 (*)     MCHC 31.2 (*)     RDW 16.9 (*)     RDW-SD 58.7 (*)     Lymphocyte % 13.4 (*)     Immature Grans % 0.7 (*)     Immature Grans, Absolute 0.06 (*)     All other components within normal limits   T4, FREE - Normal   TSH - Normal   MAGNESIUM - Normal   TYPE AND SCREEN   ABORH 2ND SPECIMEN VERIFICATION   CBC AND DIFFERENTIAL    Narrative:     The following orders were created for panel order CBC & Differential.  Procedure                               Abnormality         Status                     ---------                               -----------         ------                     CBC Auto Differential[[id number]]        Abnormal            Final result                 Please view results for these tests on the individual orders.       Meds Given in ED:   Medications   sodium chloride 0.9 % flush 10 mL (has no administration in time range)   amoxicillin-clavulanate (AUGMENTIN) 875-125 MG per tablet 1 tablet (1 tablet Oral  Given 3/3/25 1221)           Last NIH score:                                                          Dysphagia screening results:  Patient Factors Component (Dysphagia:Stroke or Rule-out)  Best Eye Response: 4-->(E4) spontaneous (03/03/25 1210)  Best Motor Response: 6-->(M6) obeys commands (03/03/25 1210)  Best Verbal Response: 5-->(V5) oriented (03/03/25 1210)  [place] Coma Scale Score: 15 (03/03/25 1210)     [place] Coma Scale:  No data recorded     CIWA:        Restraint Type:            Isolation Status:  Contact

## 2025-03-03 NOTE — CASE MANAGEMENT/SOCIAL WORK
Discharge Planning Assessment  [facility]     Patient Name: Darien Camarena  MRN: [id number]  Today's Date: 3/3/2025    Admit Date: 3/3/2025    Plan: Home   Discharge Needs Assessment       Row Name 03/03/25 1144       Living Environment    People in Home alone    Current Living Arrangements home    Potentially Unsafe Housing Conditions none    In the past 12 months has the electric, gas, oil, or water company threatened to shut off services in your home? No    Primary Care Provided by self    Provides Primary Care For no one    Family Caregiver if Needed child(ann), adult    Family Caregiver Names Nikki Ware Daughter [phone]    Quality of Family Relationships helpful;involved;supportive    Able to Return to Prior Arrangements yes       Resource/Environmental Concerns    Resource/Environmental Concerns none    Transportation Concerns none       Transportation Needs    In the past 12 months, has lack of transportation kept you from medical appointments or from getting medications? no    In the past 12 months, has lack of transportation kept you from meetings, work, or from getting things needed for daily living? No       Food Insecurity    Within the past 12 months, you worried that your food would run out before you got the money to buy more. Never true    Within the past 12 months, the food you bought just didn't last and you didn't have money to get more. Never true       Transition Planning    Patient/Family Anticipates Transition to home    Patient/Family Anticipated Services at Transition none    Transportation Anticipated family or friend will provide       Discharge Needs Assessment    Readmission Within the Last 30 Days no previous admission in last 30 days    Equipment Currently Used at Home cpap;cane, straight;walker, rolling    Concerns to be Addressed denies needs/concerns at this time    Do you want help finding or keeping work or a job? I do not need or want help    Do you want help with  school or training? For example, starting or completing job training or getting a high school diploma, GED or equivalent No    Anticipated Changes Related to Illness none    Equipment Needed After Discharge none    Current Discharge Risk lives alone                   Discharge Plan       Row Name 03/03/25 1147       Plan    Plan Home    Patient/Family in Agreement with Plan yes    Plan Comments CM spoke with patient at bedside regarding Dc planning. Patient resides in [facility], alone. Daughter's reside close by and assist as needed. Patient is independent with ADL's, has a cane/rolling walker. + CPAP. Patient denies any current home health or outpatient services. Patient has medical insurance, prescription coverage and is able to afford/obtain medications without difficulty. Patient has an advanced directives. Patient denies any discharge planning needs at this time. Goal is home. CM will continue to follow    Final Discharge Disposition Code 01 - home or self-care                  Continued Care and Services - Admitted Since 3/3/2025    No active coordination exists for this encounter.       Selected Continued Care - Prior Encounters Includes continued care and service providers with selected services from prior encounters from 12/3/2024 to 3/3/2025      Discharged on 12/18/2024 Admission date: 12/5/2024 - Discharge disposition: Skilled Nursing Facility (DC - External)      Destination       Service Provider Services Address Phone Fax Patient Preferred    [facility] [address], [facility] [address] [phone] --                             Demographic Summary       Row Name 03/03/25 1141       General Information    Arrived From home    Referral Source emergency department    Reason for Consult discharge planning    Preferred Language English       Contact Information    Contact Information Comments Nikki Ware Daughter [phone]                    Functional Status       Row Name 03/03/25 1142       Functional Status    Usual Activity Tolerance moderate    Current Activity Tolerance moderate       Physical Activity    On average, how many days per week do you engage in moderate to strenuous exercise (like a brisk walk)? 0 days    On average, how many minutes do you engage in exercise at this level? 0 min    Number of minutes of exercise per week 0       Assessment of Health Literacy    How often do you have someone help you read hospital materials? Sometimes    How often do you have problems learning about your medical condition because of difficulty understanding written information? Sometimes    How often do you have a problem understanding what is told to you about your medical condition? Sometimes    How confident are you filling out medical forms by yourself? A little bit    Health Literacy Moderate       Functional Status, IADL    Medications independent    Meal Preparation assistive person    Housekeeping assistive person    Laundry assistive person    Shopping assistive person    If for any reason you need help with day-to-day activities such as bathing, preparing meals, shopping, managing finances, etc., do you get the help you need? I get all the help I need       Mental Status    General Appearance WDL WDL       Mental Status Summary    Recent Changes in Mental Status/Cognitive Functioning no changes       Employment/    Employment Status retired                   Psychosocial    No documentation.                  Abuse/Neglect    No documentation.                  Legal    No documentation.                  Substance Abuse    No documentation.                  Patient Forms    No documentation.                     Alize Jones RN

## 2025-03-03 NOTE — ED PROVIDER NOTES
"Subjective   History of Present Illness  89-year-old male sent to the emergency department to be admitted for \"nosebleed.\"  Of note, the patient was seen in the emergency department in [place] on February 26.  I reviewed that encounter.  He has a history of recurrent epistaxis and atrial fibrillation.  He has a prior watchman and is followed by Dr. Mcdaniel.  He is not anticoagulated.  He followed up with Dr. Bueno with ENT today and was noted to have recurrent epistaxis.  Dr. Bueno packed him and noted that he was also in atrial fibrillation and was hypertensive.  He sent the patient here to the emergency department to be admitted for observation regarding his nosebleed and felt that he would benefit from tighter blood pressure control and a cardiology evaluation as well.  The patient is unsure as to what may have triggered his epistaxis.  Aside from his nosebleed, he otherwise feels at baseline at this time.  He is accompanied by family who help corroborate his history.      Review of Systems   HENT:  Positive for nosebleeds.    All other systems reviewed and are negative.      Past Medical History:   Diagnosis Date    Arrhythmia     Atrial fibrillation     Chronic kidney disease     COPD (chronic obstructive pulmonary disease)     Coronary artery disease     Dementia     Diabetes mellitus     doesnt check sugar    E. coli sepsis 06/23/2022    Enlarged prostate without lower urinary tract symptoms (luts) 06/20/2016    Full dentures     GERD (gastroesophageal reflux disease)     Gout     Hearing aid worn     bilat prn    History of colonoscopy 09/12/2012    History of radiation therapy 02/24/2023    SBRT LLL lung    [place]     hearing aids prn    Hyperlipidemia     Hypertension     Kidney stone     surgery x1    Lung cancer     STEVEN on CPAP     compliant with machine    PAF (paroxysmal atrial fibrillation)     Prostatism     Urinary incontinence     Urinary tract infection     Wears glasses     " readers       Allergies   Allergen Reactions    Sglt2 Inhibitors Other (See Comments)     Recurrent UTI    Xarelto [Rivaroxaban] GI Bleeding     GI bleed    Penicillins Hives     Has tolerated cefepime, ceftriaxone, cefazolin, cefdinir, cefuroxime, cephalexin       Past Surgical History:   Procedure Laterality Date    ATRIAL APPENDAGE EXCLUSION LEFT WITH TRANSESOPHAGEAL ECHOCARDIOGRAM N/A 11/28/2023    Procedure: Atrial Appendage Occlusion;  Surgeon: Anthony Mcdaniel MD;  Location:  [facility];  Service: Cardiovascular;  Laterality: N/A;    CARDIAC CATHETERIZATION Left 09/29/2022    Procedure: Left Heart Cath;  Surgeon: Titus Oliveros IV, MD;  Location:  [facility];  Service: Cardiovascular;  Laterality: Left;    CARDIOVERSION      CATARACT EXTRACTION Bilateral     COLONOSCOPY      CYSTOSCOPY      ENDOSCOPY      possible    ENDOSCOPY N/A 9/5/2024    Procedure: ESOPHAGOGASTRODUODENOSCOPY;  Surgeon: Anthony Arambula MD;  Location:  [facility];  Service: Gastroenterology;  Laterality: N/A;  Esophagus dilated to 18mm with 12mm-mm to 15mm, then 15mm to 18mm balloon.    ENDOSCOPY N/A 10/31/2024    Procedure: ESOPHAGOGASTRODUODENOSCOPY WITH BIOPSY;  Surgeon: Carlos Dior MD;  Location:  [facility];  Service: Gastroenterology;  Laterality: N/A;    ERCP N/A 9/5/2024    Procedure: ENDOSCOPIC RETROGRADE CHOLANGIOPANCREATOGRAPHY;  Surgeon: Anthony Arambula MD;  Location:  [facility];  Service: Gastroenterology;  Laterality: N/A;  Sphincterotomy made to ampula of common bile duct (CBD), CBD swept with 9mm-12mm balloon - sludge, stones and purulent appearing drainage extracted. 10x7 German biliary stent depolyed into CBD. ERCP scope removed with balloon intact.    ERCP N/A 10/31/2024    Procedure: ENDOSCOPIC RETROGRADE CHOLANGIOPANCREATOGRAPHY;  Surgeon: Carlos Dior MD;  Location:  [facility];  Service: Gastroenterology;  Laterality: N/A;     KIDNEY STONE SURGERY      x1       Family History   Problem Relation Age of Onset    Alzheimer's disease Mother     COPD Father     Lung cancer Father     Cancer Father     Kidney disease Father     No Known Problems Daughter     No Known Problems Daughter     No Known Problems Daughter        Social History     Socioeconomic History    Marital status:    Tobacco Use    Smoking status: Former     Current packs/day: 0.00     Average packs/day: 1 pack/day for 15.0 years (15.0 ttl pk-yrs)     Types: Cigarettes     Start date: 1947     Quit date: 1960     Years since quittin.8     Passive exposure: Past    Smokeless tobacco: Former     Types: Chew     Quit date:     Tobacco comments:     started at 15 yo.   Vaping Use    Vaping status: Never Used    Passive vaping exposure: Yes   Substance and Sexual Activity    Alcohol use: No    Drug use: Never    Sexual activity: Not Currently     Partners: Female           Objective   Physical Exam  Vitals and nursing note reviewed.   Constitutional:       General: He is not in acute distress.     Appearance: He is well-developed. He is not diaphoretic.      Comments: Nontoxic-appearing male   HENT:      Head: Normocephalic and atraumatic.      Comments: Nasal packing noted to left nare, no active epistaxis noted at this time, scant blood noted within oropharynx  Eyes:      Pupils: Pupils are equal, round, and reactive to light.   Neck:      Vascular: No JVD.   Cardiovascular:      Rate and Rhythm: Normal rate and regular rhythm.      Heart sounds: Normal heart sounds. No murmur heard.     No friction rub. No gallop.   Pulmonary:      Effort: Pulmonary effort is normal. No respiratory distress.      Breath sounds: Normal breath sounds. No wheezing or rales.   Abdominal:      General: Bowel sounds are normal. There is no distension.      Palpations: Abdomen is soft. There is no mass.      Tenderness: There is no abdominal tenderness. There is no guarding.  "  Musculoskeletal:         General: Normal range of motion.   Skin:     General: Skin is warm and dry.      Coloration: Skin is not pale.      Findings: No erythema or rash.   Neurological:      Mental Status: He is alert and oriented to person, place, and time.   Psychiatric:         Mood and Affect: Mood normal.         Thought Content: Thought content normal.         Judgment: Judgment normal.         Procedures           ED Course  ED Course as of 03/03/25 1437   Mon Mar 03, 2025   [id number] 89-year-old male sent to the emergency department to be admitted for \"nosebleed.\"  Of note, the patient was seen in the emergency department in [place] on February 26.  I reviewed that encounter.  He has a history of recurrent epistaxis and atrial fibrillation.  He has had a prior watchman and is followed by Dr. Mcdaniel.  He is not anticoagulated.  He followed up with Dr. Bueno with ENT today and was noted to have recurrent epistaxis.  He was packed and noted to be hypertensive and was sent to the emergency department to be admitted for observation per the request of Dr. Bueno.  On arrival to the ED, the patient is nontoxic-appearing.  He has a pack in his left nare and no active epistaxis at this time.  Scant blood noted within oropharynx.   current blood pressure is 141/96.  No indication to emergently lower his blood pressure any further at this time.  Labs obtained are bland/unrevealing.  Augmentin administered per Dr. Bueno's request.  He notified me that Dr. العلي of ENT will be seeing the patient in consult during his hospitalization.  I discussed the patient's case with our hospitalist, Dr. Purdy, and the patient will be admitted under her care for further evaluation and treatment.  The patient is hemodynamically stable at this time and is aware/agreeable with the plan. [DD]      ED Course User Index  [DD] Renny Hendrickson MD                                           Recent Results (from the past 24 " hours)   Comprehensive Metabolic Panel    Collection Time: 03/03/25 11:07 AM    Specimen: Blood   Result Value Ref Range    Glucose 121 (H) 65 - 99 mg/dL    BUN 23 8 - 23 mg/dL    Creatinine 0.68 (L) 0.76 - 1.27 mg/dL    Sodium 139 136 - 145 mmol/L    Potassium 4.5 3.5 - 5.2 mmol/L    Chloride 102 98 - 107 mmol/L    CO2 27.0 22.0 - 29.0 mmol/L    Calcium 8.9 8.6 - 10.5 mg/dL    Total Protein 6.9 6.0 - 8.5 g/dL    Albumin 3.0 (L) 3.5 - 5.2 g/dL    ALT (SGPT) 13 1 - 41 U/L    AST (SGOT) 27 1 - 40 U/L    Alkaline Phosphatase 160 (H) 39 - 117 U/L    Total Bilirubin 0.8 0.0 - 1.2 mg/dL    Globulin 3.9 gm/dL    A/G Ratio 0.8 g/dL    BUN/Creatinine Ratio 33.8 (H) 7.0 - 25.0    Anion Gap 10.0 5.0 - 15.0 mmol/L    eGFR 89.4 >60.0 mL/min/1.73   Type & Screen    Collection Time: 03/03/25 11:07 AM    Specimen: Blood   Result Value Ref Range    ABO Type A     RH type Positive     Antibody Screen Negative     T&S Expiration Date 3/6/2025 11:59:59 PM    T4, Free    Collection Time: 03/03/25 11:07 AM    Specimen: Blood   Result Value Ref Range    Free T4 1.16 0.92 - 1.68 ng/dL   TSH    Collection Time: 03/03/25 11:07 AM    Specimen: Blood   Result Value Ref Range    TSH 2.960 0.270 - 4.200 uIU/mL   Magnesium    Collection Time: 03/03/25 11:07 AM    Specimen: Blood   Result Value Ref Range    Magnesium 1.8 1.6 - 2.4 mg/dL   CBC Auto Differential    Collection Time: 03/03/25 11:07 AM    Specimen: Blood   Result Value Ref Range    WBC 8.79 3.40 - 10.80 10*3/mm3    RBC 3.85 (L) 4.14 - 5.80 10*6/mm3    Hemoglobin 11.5 (L) 13.0 - 17.7 g/dL    Hematocrit 36.9 (L) 37.5 - 51.0 %    MCV 95.8 79.0 - 97.0 fL    MCH 29.9 26.6 - 33.0 pg    MCHC 31.2 (L) 31.5 - 35.7 g/dL    RDW 16.9 (H) 12.3 - 15.4 %    RDW-SD 58.7 (H) 37.0 - 54.0 fl    MPV 9.8 6.0 - 12.0 fL    Platelets 205 140 - 450 10*3/mm3    Neutrophil % 75.9 42.7 - 76.0 %    Lymphocyte % 13.4 (L) 19.6 - 45.3 %    Monocyte % 6.0 5.0 - 12.0 %    Eosinophil % 3.2 0.3 - 6.2 %    Basophil % 0.8  "0.0 - 1.5 %    Immature Grans % 0.7 (H) 0.0 - 0.5 %    Neutrophils, Absolute 6.67 1.70 - 7.00 10*3/mm3    Lymphocytes, Absolute 1.18 0.70 - 3.10 10*3/mm3    Monocytes, Absolute 0.53 0.10 - 0.90 10*3/mm3    Eosinophils, Absolute 0.28 0.00 - 0.40 10*3/mm3    Basophils, Absolute 0.07 0.00 - 0.20 10*3/mm3    Immature Grans, Absolute 0.06 (H) 0.00 - 0.05 10*3/mm3    nRBC 0.0 0.0 - 0.2 /100 WBC   ECG 12 Lead Tachycardia    Collection Time: 03/03/25 12:01 PM   Result Value Ref Range    QT Interval 360 ms    QTC Interval 462 ms     Note: In addition to lab results from this visit, the labs listed above may include labs taken at another facility or during a different encounter within the last 24 hours. Please correlate lab times with ED admission and discharge times for further clarification of the services performed during this visit.    No orders to display     Vitals:    03/03/25 1029 03/03/25 1300   BP: 141/96 (!) 163/109   BP Location: Right arm Left arm   Patient Position: Sitting Sitting   Pulse: 86 85   Resp: 18 16   Temp: 97.8 °F (36.6 °C) 98.5 °F (36.9 °C)   TempSrc: Oral Oral   SpO2: 98% 95%   Weight: 98.4 kg (217 lb) 95.4 kg (210 lb 6.4 oz)   Height: 190.5 cm (75\") 190.5 cm (75\")     Medications   sodium chloride 0.9 % flush 10 mL (has no administration in time range)   amoxicillin-clavulanate (AUGMENTIN) 875-125 MG per tablet 1 tablet (has no administration in time range)   albuterol sulfate HFA (PROVENTIL HFA;VENTOLIN HFA;PROAIR HFA) inhaler 2 puff (has no administration in time range)   allopurinol (ZYLOPRIM) tablet 300 mg (has no administration in time range)   aspirin EC tablet 81 mg ( Oral Dose Auto Held 3/11/25 0900)   digoxin (LANOXIN) tablet 125 mcg (has no administration in time range)   donepezil (ARICEPT) tablet 10 mg (has no administration in time range)   finasteride (PROSCAR) tablet 5 mg (has no administration in time range)   memantine (NAMENDA) tablet 10 mg (has no administration in time range) "   metoprolol tartrate (LOPRESSOR) tablet 50 mg (50 mg Oral Not Given 3/3/25 1426)   pantoprazole (PROTONIX) EC tablet 40 mg (has no administration in time range)   pravastatin (PRAVACHOL) tablet 40 mg (has no administration in time range)   sertraline (ZOLOFT) tablet 50 mg (50 mg Oral Not Given 3/3/25 1426)   arformoterol (BROVANA) nebulizer solution 15 mcg (has no administration in time range)     And   revefenacin (YUPELRI) nebulizer solution 175 mcg (has no administration in time range)   sodium chloride 0.9 % flush 10 mL (10 mL Intravenous Given 3/3/25 1427)   sodium chloride 0.9 % flush 10 mL (has no administration in time range)   sodium chloride 0.9 % infusion 40 mL (has no administration in time range)   nitroglycerin (NITROSTAT) SL tablet 0.4 mg (has no administration in time range)   Potassium Replacement - Follow Nurse / BPA Driven Protocol (has no administration in time range)   Magnesium Cardiology Dose Replacement - Follow Nurse / BPA Driven Protocol (has no administration in time range)   Phosphorus Replacement - Follow Nurse / BPA Driven Protocol (has no administration in time range)   Calcium Replacement - Follow Nurse / BPA Driven Protocol (has no administration in time range)   acetaminophen (TYLENOL) tablet 650 mg (has no administration in time range)     Or   acetaminophen (TYLENOL) 160 MG/5ML oral solution 650 mg (has no administration in time range)     Or   acetaminophen (TYLENOL) suppository 650 mg (has no administration in time range)   sennosides-docusate (PERICOLACE) 8.6-50 MG per tablet 2 tablet (has no administration in time range)     And   polyethylene glycol (MIRALAX) packet 17 g (has no administration in time range)     And   bisacodyl (DULCOLAX) EC tablet 5 mg (has no administration in time range)     And   bisacodyl (DULCOLAX) suppository 10 mg (has no administration in time range)   dextrose (GLUTOSE) oral gel 15 g (has no administration in time range)   dextrose (D50W) (25 g/50  mL) IV injection 25 g (has no administration in time range)   glucagon (GLUCAGEN) injection 1 mg (has no administration in time range)   insulin regular (humuLIN R,novoLIN R) injection 2-9 Units ( Subcutaneous Not Given 3/3/25 1403)   magnesium sulfate 4g/100mL (PREMIX) infusion ( Intravenous Canceled Entry 3/3/25 1600)   amoxicillin-clavulanate (AUGMENTIN) 875-125 MG per tablet 1 tablet (1 tablet Oral Given 3/3/25 1221)     ECG/EMG Results (last 24 hours)       Procedure Component Value Units Date/Time    ECG 12 Lead Tachycardia [[id number]] Collected: 03/03/25 1201     Updated: 03/03/25 1256     QT Interval 360 ms      QTC Interval 462 ms     Narrative:      Test Reason : RHYTHM CHANGE  Blood Pressure :   */*   mmHG  Vent. Rate :  99 BPM     Atrial Rate :  96 BPM     P-R Int :   * ms          QRS Dur :  84 ms      QT Int : 360 ms       P-R-T Axes :   *  14 148 degrees    QTcB Int : 462 ms    Atrial fibrillation  Nonspecific ST and T wave abnormality  Abnormal ECG  When compared with ECG of 05-Dec-2024 10:48,  No significant change was found  Confirmed by MD Hendrickson Michael ([id number]) on 3/3/2025 12:55:44 PM    Referred By: KINGA           Confirmed By: Pito Hendrickson MD          ECG 12 Lead Tachycardia   Final Result   Test Reason : RHYTHM CHANGE   Blood Pressure :   */*   mmHG   Vent. Rate :  99 BPM     Atrial Rate :  96 BPM      P-R Int :   * ms          QRS Dur :  84 ms       QT Int : 360 ms       P-R-T Axes :   *  14 148 degrees     QTcB Int : 462 ms      Atrial fibrillation   Nonspecific ST and T wave abnormality   Abnormal ECG   When compared with ECG of 05-Dec-2024 10:48,   No significant change was found   Confirmed by MD Hendrickson Michael ([id number]) on 3/3/2025 12:55:44 PM      Referred By: KINGA           Confirmed By: Pito Hendrickson MD                      Medical Decision Making  Amount and/or Complexity of Data Reviewed  Labs: ordered.  ECG/medicine tests: ordered.    Risk  Prescription drug management.  Decision  regarding hospitalization.        Final diagnoses:   Posterior epistaxis   History of atrial fibrillation       ED Disposition  ED Disposition       ED Disposition   Decision to Admit    Condition   --    Comment   Level of Care: Telemetry [5]   Diagnosis: Acute posterior epistaxis [[id number]]   Admitting Physician: GREGORY ROJAS [[id number]]   Attending Physician: GREGORY ROJAS [[id number]]   Is patient appropriate for Inpatient Observation Unit?: Yes [1]                 No follow-up provider specified.       Medication List        ASK your doctor about these medications      metFORMIN 1000 MG tablet  Commonly known as: GLUCOPHAGE  Ask about: Which instructions should I use?     omeprazole 20 MG capsule  Commonly known as: priLOSEC  Ask about: Which instructions should I use?                 Renny Hendrickson MD  03/03/25 2280

## 2025-03-03 NOTE — H&P
[facility] Medicine Services  HISTORY AND PHYSICAL    Patient Name: Darien Camarena  : 1935  MRN: [id number]  Primary Care Physician: Pito Way MD  Date of admission: 3/3/2025      Subjective   Subjective     Chief Complaint:  Posterior nosebleed, recurrent    HPI:  Darien Camarena is a 89 y.o. 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    Patient and family able to assist with history.  Patient and family states patient has had nosebleeds ongoing for years.  Recent increase in frequency over this past November or December.  Patient has been followed with ENT outpatient, requiring nasal packing and cauterizations in the past.  Patient states he has never required hospitalization for nosebleed nor has required a blood transfusion.  Patient's last bleed was in 2024.  This most recent nosebleed started on this past Wednesday.  Patient was evaluated by ENT today, subsequently underwent nasal packing and was advised to present to the ED for overnight observation and continued ENT evaluation.    Patient reports compliance with all medications.  Denies any anticoagulation apart from daily aspirin 81 mg.  Denies any lightheadedness, dizziness, syncope, presyncope, chest pain, or shortness of breath.    Personal History     Past Medical History:   Diagnosis Date    Arrhythmia     Atrial fibrillation     Chronic kidney disease     COPD (chronic obstructive pulmonary disease)     Coronary artery disease     Dementia     Diabetes mellitus     doesnt check sugar    E. coli sepsis 2022    Enlarged prostate without lower urinary tract symptoms (luts) 2016    Full dentures     GERD (gastroesophageal reflux  disease)     Gout     Hearing aid worn     bilat prn    History of colonoscopy 09/12/2012    History of radiation therapy 02/24/2023    SBRT LLL lung    [place] ([place] hearing)     hearing aids prn    Hyperlipidemia     Hypertension     Kidney stone     surgery x1    Lung cancer     STEVEN on CPAP     compliant with machine    PAF (paroxysmal atrial fibrillation)     Prostatism     Urinary incontinence     Urinary tract infection     Wears glasses     readers           Past Surgical History:   Procedure Laterality Date    ATRIAL APPENDAGE EXCLUSION LEFT WITH TRANSESOPHAGEAL ECHOCARDIOGRAM N/A 11/28/2023    Procedure: Atrial Appendage Occlusion;  Surgeon: Anthony Mcdaniel MD;  Location:  [facility];  Service: Cardiovascular;  Laterality: N/A;    CARDIAC CATHETERIZATION Left 09/29/2022    Procedure: Left Heart Cath;  Surgeon: Titus Oliveros IV, MD;  Location:  [facility];  Service: Cardiovascular;  Laterality: Left;    CARDIOVERSION      CATARACT EXTRACTION Bilateral     COLONOSCOPY      CYSTOSCOPY      ENDOSCOPY      possible    ENDOSCOPY N/A 9/5/2024    Procedure: ESOPHAGOGASTRODUODENOSCOPY;  Surgeon: Anthony Arambula MD;  Location:  [facility];  Service: Gastroenterology;  Laterality: N/A;  Esophagus dilated to 18mm with 12mm-mm to 15mm, then 15mm to 18mm balloon.    ENDOSCOPY N/A 10/31/2024    Procedure: ESOPHAGOGASTRODUODENOSCOPY WITH BIOPSY;  Surgeon: Carlos Dior MD;  Location:  [facility];  Service: Gastroenterology;  Laterality: N/A;    ERCP N/A 9/5/2024    Procedure: ENDOSCOPIC RETROGRADE CHOLANGIOPANCREATOGRAPHY;  Surgeon: Anthony Arambula MD;  Location:  [facility];  Service: Gastroenterology;  Laterality: N/A;  Sphincterotomy made to ampula of common bile duct (CBD), CBD swept with 9mm-12mm balloon - sludge, stones and purulent appearing drainage extracted. 10x7 Armenian biliary stent depolyed into CBD. ERCP scope removed with balloon intact.     ERCP N/A 10/31/2024    Procedure: ENDOSCOPIC RETROGRADE CHOLANGIOPANCREATOGRAPHY;  Surgeon: Carlos Dior MD;  Location: [facility];  Service: Gastroenterology;  Laterality: N/A;    KIDNEY STONE SURGERY      x1       Family History: family history includes Alzheimer's disease in his mother; COPD in his father; Cancer in his father; Kidney disease in his father; Lung cancer in his father; No Known Problems in his daughter, daughter, and daughter.     Social History:  reports that he quit smoking about 64 years ago. His smoking use included cigarettes. He started smoking about 78 years ago. He has a 15 pack-year smoking history. He has been exposed to tobacco smoke. He quit smokeless tobacco use about 14 years ago.  His smokeless tobacco use included chew. He reports that he does not drink alcohol and does not use drugs.  Social History     Social History Narrative    Caffeine: 1 cup of decaff coffee daily        Medications:  Available home medication information reviewed.  Coenzyme Q10, Iron, Probiotic Product, albuterol sulfate HFA, allopurinol, ascorbic acid, aspirin, digoxin, docusate sodium, donepezil, finasteride, furosemide, melatonin, memantine, metFORMIN, methenamine, metoprolol tartrate, multivitamin with minerals, omeprazole, ondansetron, pravastatin, sertraline, and tiotropium bromide-olodaterol    Allergies   Allergen Reactions    Sglt2 Inhibitors Other (See Comments)     Recurrent UTI    Xarelto [Rivaroxaban] GI Bleeding     GI bleed    Penicillins Hives     Has tolerated cefepime, ceftriaxone, cefazolin, cefdinir, cefuroxime, cephalexin       Objective   Objective     Vital Signs:   Temp:  [97.8 °F (36.6 °C)-98.5 °F (36.9 °C)] 98.5 °F (36.9 °C)  Heart Rate:  [85-86] 85  Resp:  [16-18] 16  BP: (141-163)/() 163/109       Physical Exam  Constitutional:       General: He is not in acute distress.  HENT:      Nose:      Comments: Packing in left nostril, dry blood  Eyes:       General: No scleral icterus.     Extraocular Movements: Extraocular movements intact.   Cardiovascular:      Rate and Rhythm: Normal rate. Rhythm irregular.      Pulses: Normal pulses.   Pulmonary:      Effort: Pulmonary effort is normal. No respiratory distress.   Abdominal:      General: There is no distension.      Palpations: Abdomen is soft.      Tenderness: There is no abdominal tenderness. There is no guarding or rebound.   Genitourinary:     Comments: Chronic rahman, rahman bag attached to left leg  Musculoskeletal:      Right lower leg: No edema.      Left lower leg: No edema.   Skin:     General: Skin is warm.   Neurological:      Mental Status: He is alert and oriented to person, place, and time.   Psychiatric:         Mood and Affect: Mood normal.            Result Review:  I have personally reviewed the results from the time of this admission to 3/3/2025 13:56 EST and agree with these findings:  [x]  Laboratory list / accordion  []  Microbiology  []  Radiology  [x]  EKG/Telemetry   [x]  Cardiology/Vascular   []  Pathology  [x]  Old records  []  Other:  Most notable findings include: Packing of left nostril      LAB RESULTS:      Lab 03/03/25  1107   WBC 8.79   HEMOGLOBIN 11.5*   HEMATOCRIT 36.9*   PLATELETS 205   NEUTROS ABS 6.67   IMMATURE GRANS (ABS) 0.06*   LYMPHS ABS 1.18   MONOS ABS 0.53   EOS ABS 0.28   MCV 95.8         Lab 03/03/25  1107   SODIUM 139   POTASSIUM 4.5   CHLORIDE 102   CO2 27.0   ANION GAP 10.0   BUN 23   CREATININE 0.68*   EGFR 89.4   GLUCOSE 121*   CALCIUM 8.9   MAGNESIUM 1.8   TSH 2.960         Lab 03/03/25  1107   TOTAL PROTEIN 6.9   ALBUMIN 3.0*   GLOBULIN 3.9   ALT (SGPT) 13   AST (SGOT) 27   BILIRUBIN 0.8   ALK PHOS 160*                 Lab 03/03/25  1107   ABO TYPING A   RH TYPING Positive   ANTIBODY SCREEN Negative         UA          9/4/2024    21:48 12/5/2024    10:52 12/5/2024    14:32 2/17/2025    14:34 2/17/2025    14:40   Urinalysis   Squamous Epithelial Cells, UA 3-6    None Seen  0-2     Specific Gravity, UA 1.015   1.010      Ketones, UA Negative  Negative  Negative   Negative    Blood, UA Negative   Negative      Leukocytes, UA Small (1+)  Large (3+)  Moderate (2+)   500 Germaine/ul    Nitrite, UA Negative   Positive      RBC, UA 0-2   0-2  Too Numerous to Count     WBC, UA 11-20   21-50  Too Numerous to Count     Bacteria, UA 3+   4+  4+         Microbiology Results (last 10 days)       ** No results found for the last 240 hours. **            No radiology results from the last 24 hrs    Results for orders placed during the hospital encounter of 11/21/24    Adult Transesophageal Echo 3D (FARHAD) W/ Cont If Necessary Per Protocol    Interpretation Summary    There is a 27mm Watchman FLX device in place. It appears well seated and well compressed with no device related thrombus seen. There is a small jet of color flow, 2.5 mm, at the superior aspect adjacent to the left upper pulmonary vein. This was not seen previous examination however image quality is improved compared to prior.    Left ventricular systolic function is moderately decreased. Left ventricular ejection fraction appears to be 36 - 40%. Normal left ventricular wall thickness noted. The left ventricular cavity is dilated. There is left ventricular global hypokinesis noted.    : The right ventricular cavity is mildly dilated. Mildly reduced right ventricular systolic function noted.    : The left atrial cavity is severely dilated. No evidence of a left atrial thrombus present. There is light spontaneous echo contrast present in the atrial body.    The right atrial cavity is severely dilated.    There is mild, bileaflet mitral valve thickening present. Mild mitral valve regurgitation is present. No significant mitral valve stenosis is present.    Mild tricuspid valve regurgitation is present. Estimated right ventricular systolic pressure from tricuspid regurgitation is mildly elevated (35-45 mmHg).    There is moderate  pulmonic valve regurgitation present.      Assessment & Plan   Assessment & Plan       Acute posterior epistaxis    Darien Camarena is a 89 y.o. male with past medical history of hypertension, hyperlipidemia, HFrEF, A-fib status post watchman (not on AC 2/2 hx of GIB and hematuria), type 2 diabetes, gout, CKD stage III and chronic urinary retention with chronic Del Cid, GERD, depression, and COPD presenting with recurrent posterior nosebleed.    Recurrent posterior nosebleed  -Patient with nosebleeds ongoing for years, recent increase in frequency over this past November or December (however without a nosebleed since that time)  -Current nose bleed episode started this past Wednesday, evaluated by ENT today (Dr. Verdin) s/p nasal packing  -Hemoglobin stable  PLAN  - ENT consulted, to be seen by Dr. العلي while inpatient  - N.p.o. until inpatient evaluation by ENT  - Holding AC (including home ASA 81mg)  - q8hr Hgb, type and screen  - Continue Augmentin to cover bacterial sinusitis per ENT    HTN  Afib s/p Watchman (not on AC 2/2 hx of GIB and hematuria)  -Continue digoxin, metoprolol tartrate    HLD  -Continue pravastatin    T2DM  -Home medications include metformin, denies insulin use  -SSI while inpatient    Gout   -Continue allopurinol    CKD stage III  Chronic Del Cid 2/2 chronic urinary retention  -Follows with urology   -Continue Del Cid catheter  -Continue finasteride     GERD  -Continue pantoprazole    Depression/mood disorder  -Continue home donepezil, memantine, sertraline    COPD  -Continue inhalers (adjusted based on inpatient formulary)    Lung nodule (POA)  - LLL nodule, noted on CT chest in 12/2024  - Follow up scan recommended, order already in place. Recommend repeat CT chest outpatient.         VTE Prophylaxis:  Mechanical VTE prophylaxis orders are present.          CODE STATUS:    Code Status and Medical Interventions: No CPR (Do Not Attempt to Resuscitate); Limited Support; No intubation (DNI)    Ordered at: 03/03/25 1248     Medical Intervention Limits:    No intubation (DNI)     Level Of Support Discussed With:    Patient     Code Status (Patient has no pulse and is not breathing):    No CPR (Do Not Attempt to Resuscitate)     Medical Interventions (Patient has pulse or is breathing):    Limited Support       Expected Discharge   Expected discharge date/ time has not been documented.     Spenser Pruitt,   03/03/25

## 2025-03-03 NOTE — PLAN OF CARE
Called by Dr. العلي about potential epistaxis embolization for Mr. Camarena's epistaxis.  Certainly sounds like he is a good candidate for embolization procedure, and we will make him n.p.o. after midnight and tentatively plan on doing this in the morning on 3/4/2025.    We will come by first thing in the morning and do a formal consult, and discussed the procedure with the patient/family.

## 2025-03-04 LAB
ANION GAP SERPL CALCULATED.3IONS-SCNC: 11 MMOL/L (ref 5–15)
BASOPHILS # BLD AUTO: 0.05 10*3/MM3 (ref 0–0.2)
BASOPHILS NFR BLD AUTO: 0.6 % (ref 0–1.5)
BUN SERPL-MCNC: 23 MG/DL (ref 8–23)
BUN/CREAT SERPL: 25.8 (ref 7–25)
CALCIUM SPEC-SCNC: 8.3 MG/DL (ref 8.6–10.5)
CHLORIDE SERPL-SCNC: 101 MMOL/L (ref 98–107)
CO2 SERPL-SCNC: 25 MMOL/L (ref 22–29)
CREAT SERPL-MCNC: 0.89 MG/DL (ref 0.76–1.27)
DEPRECATED RDW RBC AUTO: 59.2 FL (ref 37–54)
EGFRCR SERPLBLD CKD-EPI 2021: 82.4 ML/MIN/1.73
EOSINOPHIL # BLD AUTO: 0.32 10*3/MM3 (ref 0–0.4)
EOSINOPHIL NFR BLD AUTO: 3.8 % (ref 0.3–6.2)
ERYTHROCYTE [DISTWIDTH] IN BLOOD BY AUTOMATED COUNT: 17.1 % (ref 12.3–15.4)
GLUCOSE BLDC GLUCOMTR-MCNC: 101 MG/DL (ref 70–130)
GLUCOSE BLDC GLUCOMTR-MCNC: 113 MG/DL (ref 70–130)
GLUCOSE BLDC GLUCOMTR-MCNC: 113 MG/DL (ref 70–130)
GLUCOSE BLDC GLUCOMTR-MCNC: 120 MG/DL (ref 70–130)
GLUCOSE BLDC GLUCOMTR-MCNC: 127 MG/DL (ref 70–130)
GLUCOSE BLDC GLUCOMTR-MCNC: 140 MG/DL (ref 70–130)
GLUCOSE SERPL-MCNC: 96 MG/DL (ref 65–99)
HCT VFR BLD AUTO: 29 % (ref 37.5–51)
HCT VFR BLD AUTO: 29.3 % (ref 37.5–51)
HCT VFR BLD AUTO: 30.3 % (ref 37.5–51)
HGB BLD-MCNC: 9.2 G/DL (ref 13–17.7)
HGB BLD-MCNC: 9.3 G/DL (ref 13–17.7)
HGB BLD-MCNC: 9.5 G/DL (ref 13–17.7)
IMM GRANULOCYTES # BLD AUTO: 0.05 10*3/MM3 (ref 0–0.05)
IMM GRANULOCYTES NFR BLD AUTO: 0.6 % (ref 0–0.5)
LYMPHOCYTES # BLD AUTO: 1.33 10*3/MM3 (ref 0.7–3.1)
LYMPHOCYTES NFR BLD AUTO: 15.7 % (ref 19.6–45.3)
MAGNESIUM SERPL-MCNC: 2.2 MG/DL (ref 1.6–2.4)
MCH RBC QN AUTO: 29.8 PG (ref 26.6–33)
MCHC RBC AUTO-ENTMCNC: 31.4 G/DL (ref 31.5–35.7)
MCV RBC AUTO: 95 FL (ref 79–97)
MONOCYTES # BLD AUTO: 0.65 10*3/MM3 (ref 0.1–0.9)
MONOCYTES NFR BLD AUTO: 7.7 % (ref 5–12)
NEUTROPHILS NFR BLD AUTO: 6.07 10*3/MM3 (ref 1.7–7)
NEUTROPHILS NFR BLD AUTO: 71.6 % (ref 42.7–76)
NRBC BLD AUTO-RTO: 0 /100 WBC (ref 0–0.2)
PLATELET # BLD AUTO: 189 10*3/MM3 (ref 140–450)
PMV BLD AUTO: 9.4 FL (ref 6–12)
POTASSIUM SERPL-SCNC: 3.9 MMOL/L (ref 3.5–5.2)
RBC # BLD AUTO: 3.19 10*6/MM3 (ref 4.14–5.8)
SODIUM SERPL-SCNC: 137 MMOL/L (ref 136–145)
WBC NRBC COR # BLD AUTO: 8.47 10*3/MM3 (ref 3.4–10.8)

## 2025-03-04 PROCEDURE — C1887 CATHETER, GUIDING: HCPCS | Performed by: RADIOLOGY

## 2025-03-04 PROCEDURE — 25010000002 FENTANYL CITRATE (PF) 50 MCG/ML SOLUTION: Performed by: RADIOLOGY

## 2025-03-04 PROCEDURE — 25510000002 IODIXANOL PER 1 ML: Performed by: RADIOLOGY

## 2025-03-04 PROCEDURE — 82948 REAGENT STRIP/BLOOD GLUCOSE: CPT

## 2025-03-04 PROCEDURE — 36223 PLACE CATH CAROTID/INOM ART: CPT | Performed by: RADIOLOGY

## 2025-03-04 PROCEDURE — 75898 FOLLOW-UP ANGIOGRAPHY: CPT | Performed by: RADIOLOGY

## 2025-03-04 PROCEDURE — 94664 DEMO&/EVAL PT USE INHALER: CPT

## 2025-03-04 PROCEDURE — 76937 US GUIDE VASCULAR ACCESS: CPT | Performed by: RADIOLOGY

## 2025-03-04 PROCEDURE — 99232 SBSQ HOSP IP/OBS MODERATE 35: CPT | Performed by: STUDENT IN AN ORGANIZED HEALTH CARE EDUCATION/TRAINING PROGRAM

## 2025-03-04 PROCEDURE — 61624 TCAT PERM OCCLS/EMBOLJ CNS: CPT | Performed by: RADIOLOGY

## 2025-03-04 PROCEDURE — 85018 HEMOGLOBIN: CPT | Performed by: STUDENT IN AN ORGANIZED HEALTH CARE EDUCATION/TRAINING PROGRAM

## 2025-03-04 PROCEDURE — 99152 MOD SED SAME PHYS/QHP 5/>YRS: CPT | Performed by: RADIOLOGY

## 2025-03-04 PROCEDURE — 75894 X-RAYS TRANSCATH THERAPY: CPT | Performed by: RADIOLOGY

## 2025-03-04 PROCEDURE — 94799 UNLISTED PULMONARY SVC/PX: CPT

## 2025-03-04 PROCEDURE — 63710000001 REVEFENACIN 175 MCG/3ML SOLUTION: Performed by: STUDENT IN AN ORGANIZED HEALTH CARE EDUCATION/TRAINING PROGRAM

## 2025-03-04 PROCEDURE — 25010000002 LIDOCAINE 1 % SOLUTION: Performed by: RADIOLOGY

## 2025-03-04 PROCEDURE — 99153 MOD SED SAME PHYS/QHP EA: CPT | Performed by: RADIOLOGY

## 2025-03-04 PROCEDURE — 25810000003 SODIUM CHLORIDE 0.9 % SOLUTION

## 2025-03-04 PROCEDURE — 85025 COMPLETE CBC W/AUTO DIFF WBC: CPT | Performed by: STUDENT IN AN ORGANIZED HEALTH CARE EDUCATION/TRAINING PROGRAM

## 2025-03-04 PROCEDURE — 80048 BASIC METABOLIC PNL TOTAL CA: CPT | Performed by: STUDENT IN AN ORGANIZED HEALTH CARE EDUCATION/TRAINING PROGRAM

## 2025-03-04 PROCEDURE — 25010000002 MIDAZOLAM PER 1 MG: Performed by: RADIOLOGY

## 2025-03-04 PROCEDURE — 83735 ASSAY OF MAGNESIUM: CPT | Performed by: STUDENT IN AN ORGANIZED HEALTH CARE EDUCATION/TRAINING PROGRAM

## 2025-03-04 PROCEDURE — 61626 TCAT PERM OCCLS/EMBOL NONCNS: CPT | Performed by: RADIOLOGY

## 2025-03-04 PROCEDURE — C1894 INTRO/SHEATH, NON-LASER: HCPCS | Performed by: RADIOLOGY

## 2025-03-04 PROCEDURE — 25010000002 HEPARIN (PORCINE) PER 1000 UNITS: Performed by: RADIOLOGY

## 2025-03-04 PROCEDURE — C1889 IMPLANT/INSERT DEVICE, NOC: HCPCS | Performed by: RADIOLOGY

## 2025-03-04 PROCEDURE — 85018 HEMOGLOBIN: CPT

## 2025-03-04 PROCEDURE — 85014 HEMATOCRIT: CPT

## 2025-03-04 PROCEDURE — C1769 GUIDE WIRE: HCPCS | Performed by: RADIOLOGY

## 2025-03-04 PROCEDURE — 85014 HEMATOCRIT: CPT | Performed by: STUDENT IN AN ORGANIZED HEALTH CARE EDUCATION/TRAINING PROGRAM

## 2025-03-04 PROCEDURE — 25810000003 SODIUM CHLORIDE 0.9 % SOLUTION: Performed by: RADIOLOGY

## 2025-03-04 PROCEDURE — 99221 1ST HOSP IP/OBS SF/LOW 40: CPT

## 2025-03-04 PROCEDURE — 25010000002 NICARDIPINE 2.5 MG/ML SOLUTION: Performed by: RADIOLOGY

## 2025-03-04 DEVICE — 360 SOFT DETACHABLE COIL
Type: IMPLANTABLE DEVICE | Status: FUNCTIONAL
Brand: TARGET XL

## 2025-03-04 DEVICE — PARTICL EMB CONTRL PVA 250/355MH BX/5: Type: IMPLANTABLE DEVICE | Status: FUNCTIONAL

## 2025-03-04 RX ORDER — LIDOCAINE HYDROCHLORIDE 10 MG/ML
INJECTION, SOLUTION INFILTRATION; PERINEURAL
Status: DISCONTINUED | OUTPATIENT
Start: 2025-03-04 | End: 2025-03-04 | Stop reason: HOSPADM

## 2025-03-04 RX ORDER — IODIXANOL 320 MG/ML
INJECTION, SOLUTION INTRAVASCULAR
Status: DISCONTINUED | OUTPATIENT
Start: 2025-03-04 | End: 2025-03-04 | Stop reason: HOSPADM

## 2025-03-04 RX ORDER — HEPARIN SODIUM 1000 [USP'U]/ML
INJECTION, SOLUTION INTRAVENOUS; SUBCUTANEOUS
Status: DISCONTINUED | OUTPATIENT
Start: 2025-03-04 | End: 2025-03-04 | Stop reason: HOSPADM

## 2025-03-04 RX ORDER — SODIUM CHLORIDE 9 MG/ML
50 INJECTION, SOLUTION INTRAVENOUS CONTINUOUS
Status: ACTIVE | OUTPATIENT
Start: 2025-03-04 | End: 2025-03-04

## 2025-03-04 RX ORDER — FENTANYL CITRATE 50 UG/ML
INJECTION, SOLUTION INTRAMUSCULAR; INTRAVENOUS
Status: DISCONTINUED | OUTPATIENT
Start: 2025-03-04 | End: 2025-03-04 | Stop reason: HOSPADM

## 2025-03-04 RX ORDER — MIDAZOLAM HYDROCHLORIDE 1 MG/ML
INJECTION, SOLUTION INTRAMUSCULAR; INTRAVENOUS
Status: DISCONTINUED | OUTPATIENT
Start: 2025-03-04 | End: 2025-03-04 | Stop reason: HOSPADM

## 2025-03-04 RX ADMIN — ARFORMOTEROL TARTRATE 15 MCG: 15 SOLUTION RESPIRATORY (INHALATION) at 20:15

## 2025-03-04 RX ADMIN — METOPROLOL TARTRATE 50 MG: 50 TABLET, FILM COATED ORAL at 11:51

## 2025-03-04 RX ADMIN — PRAVASTATIN SODIUM 40 MG: 40 TABLET ORAL at 21:00

## 2025-03-04 RX ADMIN — METOPROLOL TARTRATE 50 MG: 50 TABLET, FILM COATED ORAL at 21:01

## 2025-03-04 RX ADMIN — DIGOXIN 125 MCG: 125 TABLET ORAL at 11:51

## 2025-03-04 RX ADMIN — MEMANTINE 10 MG: 10 TABLET ORAL at 20:59

## 2025-03-04 RX ADMIN — SERTRALINE HYDROCHLORIDE 50 MG: 50 TABLET ORAL at 11:51

## 2025-03-04 RX ADMIN — AMOXICILLIN AND CLAVULANATE POTASSIUM 1 TABLET: 875; 125 TABLET, FILM COATED ORAL at 11:50

## 2025-03-04 RX ADMIN — MEMANTINE 10 MG: 10 TABLET ORAL at 11:51

## 2025-03-04 RX ADMIN — ARFORMOTEROL TARTRATE 15 MCG: 15 SOLUTION RESPIRATORY (INHALATION) at 09:17

## 2025-03-04 RX ADMIN — DONEPEZIL HYDROCHLORIDE 10 MG: 10 TABLET, FILM COATED ORAL at 20:59

## 2025-03-04 RX ADMIN — Medication 10 ML: at 11:52

## 2025-03-04 RX ADMIN — Medication 10 ML: at 20:59

## 2025-03-04 RX ADMIN — FINASTERIDE 5 MG: 5 TABLET, FILM COATED ORAL at 21:00

## 2025-03-04 RX ADMIN — AMOXICILLIN AND CLAVULANATE POTASSIUM 1 TABLET: 875; 125 TABLET, FILM COATED ORAL at 20:59

## 2025-03-04 RX ADMIN — REVEFENACIN 175 MCG: 175 SOLUTION RESPIRATORY (INHALATION) at 09:17

## 2025-03-04 RX ADMIN — SODIUM CHLORIDE 50 ML/HR: 9 INJECTION, SOLUTION INTRAVENOUS at 11:55

## 2025-03-04 RX ADMIN — ALLOPURINOL 300 MG: 300 TABLET ORAL at 11:51

## 2025-03-04 NOTE — PROGRESS NOTES
Malnutrition Severity Assessment    Patient Name:  Darien Camarena  YOB: 1935  MRN: [id number]  Admit Date:  3/3/2025    Patient meets criteria for : Moderate (non-severe) Malnutrition (Pt meets criteria for non severe chronic malnutrition based on wt hx w wasting.)    Comments:      Malnutrition Severity Assessment  Malnutrition Type: Chronic Disease - Related Malnutrition  Malnutrition Type (Last 8 Hours)       Malnutrition Severity Assessment       Row Name 03/04/25 1753       Malnutrition Severity Assessment    Malnutrition Type Chronic Disease - Related Malnutrition      Row Name 03/04/25 1753       Insufficient Energy Intake     Insufficient Energy Intake Findings --  unable to quantify PTA      Row Name 03/04/25 1753       Unintentional Weight Loss     Unintentional Weight Loss Findings Severe    Unintentional Weight Loss  Weight loss greater than 5% in one month      Row Name 03/04/25 1753       Muscle Loss    Loss of Muscle Mass Findings Mild    Taoism Region --  mild    Clavicle Bone Region --  mild    Acromion Bone Region None    Scapular Bone Region --  mild    Dorsal Hand Region --  mild    Patellar Region None    Anterior Thigh Region None    Posterior Calf Region --  mild      Row Name 03/04/25 1753       Fat Loss    Subcutaneous Fat Loss Findings Mild    Orbital Region  --  mild    Thoracic & Lumbar Region --  mild      Row Name 03/04/25 1753       Criteria Met (Must meet criteria for severity in at least 2 of these categories: M Wasting, Fat Loss, Fluid, Secondary Signs, Wt. Status, Intake)    Patient meets criteria for  Moderate (non-severe) Malnutrition  Pt meets criteria for non severe chronic malnutrition based on wt hx w wasting.                    Electronically signed by:  Eboni Lyn RD  03/04/25 18:03 EST

## 2025-03-04 NOTE — CONSULTS
"          Clinical Nutrition Assessment     Patient Name: Darien Camarena  YOB: 1935  MRN: [id number]  Date of Encounter: 03/04/25 17:56 EST  Admission date: 3/3/2025  Reason for Visit: Identified at risk by screening criteria, MST score 2+, Malnutrition Severity Assessment    Assessment   Nutrition Assessment   Admission Diagnosis:  Acute posterior epistaxis [R04.0]    Problem List:    Acute posterior epistaxis      PMH:   He  has a past medical history of Arrhythmia, Atrial fibrillation, Chronic kidney disease, COPD (chronic obstructive pulmonary disease), Coronary artery disease, Dementia, Diabetes mellitus, E. coli sepsis (06/23/2022), Enlarged prostate without lower urinary tract symptoms (luts) (06/20/2016), Full dentures, GERD (gastroesophageal reflux disease), Gout, Hearing aid worn, History of colonoscopy (09/12/2012), History of radiation therapy (02/24/2023), [place] (hard of hearing), Hyperlipidemia, Hypertension, Kidney stone, Lung cancer, STEVEN on CPAP, PAF (paroxysmal atrial fibrillation), Prostatism, Urinary incontinence, Urinary tract infection, and Wears glasses.    PSH:  He  has a past surgical history that includes Kidney stone surgery; Cataract extraction (Bilateral); Cardioversion; Cardiac catheterization (Left, 09/29/2022); Cystoscopy; Colonoscopy; Esophagogastroduodenoscopy; Atrial Appendage Exclusion Left (N/A, 11/28/2023); Esophagogastroduodenoscopy (N/A, 9/5/2024); ERCP (N/A, 9/5/2024); ERCP (N/A, 10/31/2024); and Esophagogastroduodenoscopy (N/A, 10/31/2024).    Applicable Nutrition History:       Anthropometrics     Height: Height: 190.5 cm (75\")  Last Filed Weight: Weight: 95.4 kg (210 lb 6.4 oz) (03/03/25 1300)  Method: Weight Method: Bed scale  BMI: BMI (Calculated): 26.3    UBW:  Per EMR standing scale wt of 245 lbs on 10/25/24, standing scale wt of 200 lbs on 12/9/24 215 lbs on 2/17/25     Weight change: weight loss of 45 lbs (18%) over 1 1/2 month(s)    Significant?  " Yes  w some but not full recovery.    Nutrition Focused Physical Exam    Date: 3/4    Patient meets criteria for malnutrition diagnosis, see MSA note.     Subjective   Reported/Observed/Food/Nutrition Related History:     3/4  Pt allows wt is 215 lbs  as of last MD visit. Ate OK PTA. Anticipates discharge 3/5.     Current Nutrition Prescription   PO: Diet: Cardiac, Diabetic; Healthy Heart (2-3 Na+); Consistent Carbohydrate; Fluid Consistency: Thin (IDDSI 0)  Oral Nutrition Supplement:   Intake: Insufficient data    Assessment & Plan   Nutrition Diagnosis   Date:  3/4            Updated:    Problem Malnutrition  non severe chronic    Etiology Effect medical conditions including CA   Signs/Symptoms Wt hx w Wasting   Status: New      Goal / Objectives:   Nutrition to support treatment and Establish PO      Nutrition Intervention      Follow treatment progress, Care plan reviewed, Advise alternate selection, Menu provided, Encourage intake    Follow for intake progression if adm extended as able.    Monitoring/Evaluation:   Per protocol, I&O, PO intake, Pertinent labs, Weight, Symptoms    Eboni Lyn RD  Time Spent: 30 min

## 2025-03-04 NOTE — BRIEF OP NOTE
CV COIL EMBOLIZATION  Progress Note    Darien Camarena  3/4/2025    Pre-op Diagnosis:   Recurrent, refractory left sided posterior epistaxis       Post-Op Diagnosis Codes:  Same    Procedure(s):      Procedure(s):  Coil Embolization - Epitaxis              Surgeon(s):  Bipin Eldridge MD    Anesthesia: * No anesthesia type entered *    Staff:   Scrub Person: En Lynn  Documenter: Emily Salazar  Invasive Nurse: La Nena Curran RN; Keri Edwards RN       Estimated Blood Loss: minimal    Urine Voided: * No values recorded between 3/4/2025  9:42 AM and 3/4/2025 11:18 AM *    Specimens:                None      Drains:   Urethral Catheter Non-latex;Silicone 16 Fr. (Active)       [REMOVED] Urethral Catheter Silicone 16 Fr. (Removed)       Findings: Bilateral carotid angiography demonstrates no active contrast extravasation, pseudoaneurysm, or vascular anomaly to account for the patient's recurrent, refractory left-sided posterior epistaxis.  The bilateral distal internal maxillary artery vascular territories were prophylactically embolized with PVA particles ranging in size from 250-350 µm, along with placement of 3 Target XL microcoil's on the left side, resulting in stagnant contrast within the distal internal maxillary arteries and marked reduction in nasal/ethmoidal blush.      Complications: None apparent.    Recommendations: Nasal packing will be removed, per ENT.  Patient is okay to discharge once recovered from radial access, from a neurointerventional standpoint.  The patient does not need any follow-up with neurointerventional.      Bipin Eldridge MD     Date: 3/4/2025  Time: 11:28 EST

## 2025-03-04 NOTE — PROGRESS NOTES
[facility] Medicine Services  PROGRESS NOTE    Patient Name: Darien Camarena  : 1935  MRN: [id number]    Date of Admission: 3/3/2025  Primary Care Physician: Pito Way MD    Subjective   Subjective     CC:  Posterior nosebleed, recurrent    HPI:  Patient seen and examined this morning.  No acute concerns at this time.  Hemoglobin is stable and bleeding seems to have subsided.      Objective   Objective     Vital Signs:   Temp:  [97.8 °F (36.6 °C)-98.7 °F (37.1 °C)] 98.7 °F (37.1 °C)  Heart Rate:  [] 77  Resp:  [14-18] 18  BP: (125-170)/() 125/82     Physical Exam  Constitutional:       General: He is not in acute distress.  HENT:      Nose:      Comments: Dried blood under nose  Cardiovascular:      Rate and Rhythm: Normal rate and regular rhythm.      Heart sounds: Normal heart sounds.   Pulmonary:      Effort: Pulmonary effort is normal.      Breath sounds: Normal breath sounds.   Abdominal:      General: There is no distension.      Palpations: Abdomen is soft.      Tenderness: There is no abdominal tenderness.   Musculoskeletal:      Right lower leg: No edema.      Left lower leg: No edema.   Neurological:      General: No focal deficit present.      Mental Status: He is alert.          Results Reviewed:  LAB RESULTS:      Lab 25  1605 25  0529 25  0008 25  1625 25  1107   WBC  --  8.47  --   --  8.79   HEMOGLOBIN 9.3* 9.5* 9.2* 9.8* 11.5*   HEMATOCRIT 29.3* 30.3* 29.0* 31.2* 36.9*   PLATELETS  --  189  --   --  205   NEUTROS ABS  --  6.07  --   --  6.67   IMMATURE GRANS (ABS)  --  0.05  --   --  0.06*   LYMPHS ABS  --  1.33  --   --  1.18   MONOS ABS  --  0.65  --   --  0.53   EOS ABS  --  0.32  --   --  0.28   MCV  --  95.0  --   --  95.8         Lab 25  0529 25  1107   SODIUM 137 139   POTASSIUM 3.9 4.5   CHLORIDE 101 102   CO2 25.0 27.0   ANION GAP 11.0 10.0   BUN 23 23   CREATININE 0.89 0.68*   EGFR 82.4  89.4   GLUCOSE 96 121*   CALCIUM 8.3* 8.9   MAGNESIUM 2.2 1.8   TSH  --  2.960         Lab 03/03/25  1107   TOTAL PROTEIN 6.9   ALBUMIN 3.0*   GLOBULIN 3.9   ALT (SGPT) 13   AST (SGOT) 27   BILIRUBIN 0.8   ALK PHOS 160*                 Lab 03/03/25  1625 03/03/25  1107   ABO TYPING A A   RH TYPING Positive Positive   ANTIBODY SCREEN  --  Negative         Brief Urine Lab Results  (Last result in the past 365 days)        Color   Clarity   Blood   Leuk Est   Nitrite   Protein   CREAT   Urine HCG        02/17/25 1440 Dark Yellow   Cloudy   250 Meet/ul   500 Germaine/ul   Negative   Trace                   Microbiology Results Abnormal       None            No radiology results from the last 24 hrs    Results for orders placed during the hospital encounter of 11/21/24    Adult Transesophageal Echo 3D (FARHAD) W/ Cont If Necessary Per Protocol    Interpretation Summary    There is a 27mm Watchman FLX device in place. It appears well seated and well compressed with no device related thrombus seen. There is a small jet of color flow, 2.5 mm, at the superior aspect adjacent to the left upper pulmonary vein. This was not seen previous examination however image quality is improved compared to prior.    Left ventricular systolic function is moderately decreased. Left ventricular ejection fraction appears to be 36 - 40%. Normal left ventricular wall thickness noted. The left ventricular cavity is dilated. There is left ventricular global hypokinesis noted.    : The right ventricular cavity is mildly dilated. Mildly reduced right ventricular systolic function noted.    : The left atrial cavity is severely dilated. No evidence of a left atrial thrombus present. There is light spontaneous echo contrast present in the atrial body.    The right atrial cavity is severely dilated.    There is mild, bileaflet mitral valve thickening present. Mild mitral valve regurgitation is present. No significant mitral valve stenosis is present.    Mild  tricuspid valve regurgitation is present. Estimated right ventricular systolic pressure from tricuspid regurgitation is mildly elevated (35-45 mmHg).    There is moderate pulmonic valve regurgitation present.      Current medications:  Scheduled Meds:allopurinol, 300 mg, Oral, Daily  amoxicillin-clavulanate, 1 tablet, Oral, Q12H  arformoterol, 15 mcg, Nebulization, BID - RT   And  revefenacin, 175 mcg, Nebulization, Daily - RT  [Held by provider] aspirin, 81 mg, Oral, Daily  digoxin, 125 mcg, Oral, Daily  donepezil, 10 mg, Oral, Nightly  finasteride, 5 mg, Oral, Nightly  insulin regular, 2-9 Units, Subcutaneous, Q6H  memantine, 10 mg, Oral, BID  metoprolol tartrate, 50 mg, Oral, Q12H  pantoprazole, 40 mg, Oral, Q AM  pravastatin, 40 mg, Oral, Nightly  sertraline, 50 mg, Oral, Daily  sodium chloride, 10 mL, Intravenous, Q12H      Continuous Infusions:   PRN Meds:.  acetaminophen **OR** acetaminophen **OR** acetaminophen    albuterol sulfate HFA    senna-docusate sodium **AND** polyethylene glycol **AND** bisacodyl **AND** bisacodyl    Calcium Replacement - Follow Nurse / BPA Driven Protocol    dextrose    dextrose    glucagon (human recombinant)    Magnesium Cardiology Dose Replacement - Follow Nurse / BPA Driven Protocol    melatonin    nitroglycerin    Phosphorus Replacement - Follow Nurse / BPA Driven Protocol    Potassium Replacement - Follow Nurse / BPA Driven Protocol    [COMPLETED] Insert Peripheral IV **AND** sodium chloride    sodium chloride    sodium chloride    Assessment & Plan   Assessment & Plan     Active Hospital Problems    Diagnosis  POA    **Acute posterior epistaxis [R04.0]  Yes      Resolved Hospital Problems   No resolved problems to display.        Brief Hospital Course to date:  Darien Camarena is a 89 y.o. 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    Recurrent posterior nosebleed  -Patient with nosebleeds ongoing for years, recent increase in frequency over this past November or December (however without a nosebleed since that time)  -Patient underwent embolization today with neurosurgery.  Packing later removed by ENT.  -Bleeding seems to have subsided  -ENT recommended holding aspirin indefinitely, discussed with patient and daughter.  -Hemoglobin dropped this morning to 9.2 from 11.5, has been stable since  - Continue Augmentin to cover bacterial sinusitis per ENT     HTN  Afib s/p Watchman (not on AC 2/2 hx of GIB and hematuria)  -Continue digoxin, metoprolol tartrate     HLD  -Continue pravastatin     T2DM  -Home medications include metformin, denies insulin use  -SSI while inpatient     Gout   -Continue allopurinol     CKD stage III  Chronic Del Cid 2/2 chronic urinary retention  -Follows with urology   -Continue Del Cid catheter  -Continue finasteride      GERD  -Continue pantoprazole     Depression/mood disorder  -Continue home donepezil, memantine, sertraline     COPD  -Continue inhalers (adjusted based on inpatient formulary)     Lung nodule (POA)  - LLL nodule, noted on CT chest in 12/2024  - Follow up scan recommended, order already in place. Recommend repeat CT chest outpatient.     Expected Discharge Location and Transportation: Home, likely tomorrow if hemoglobin stable  Expected Discharge   Expected Discharge Date: 3/5/2025; Expected Discharge Time:      VTE Prophylaxis:  Mechanical VTE prophylaxis orders are present.         AM-PAC 6 Clicks Score (PT): 19 (03/03/25 9240)    CODE STATUS:   Code Status and Medical Interventions: No CPR (Do Not Attempt to Resuscitate); Limited Support; No intubation (DNI)   Ordered at: 03/03/25 1248     Medical Intervention Limits:    No intubation (DNI)     Level Of Support Discussed With:    Patient     Code Status (Patient has no pulse and is not breathing):    No CPR (Do Not  Attempt to Resuscitate)     Medical Interventions (Patient has pulse or is breathing):    Limited Support       Jazmín Tee MD  03/04/25

## 2025-03-04 NOTE — PROGRESS NOTES
ENT Progress Note      Subjective   No further bleeding after embolization on the left      Objective     Vital Signs  Temp:  [97.8 °F (36.6 °C)-98.8 °F (37.1 °C)] 97.8 °F (36.6 °C)  Heart Rate:  [] 89  Resp:  [14-18] 18  BP: (150-170)/() 160/97    Physical Exam: Packing in place on the left.  I did remove the air from the balloon today he tolerated this quite well.  Will likely remove packing later today if no evidence any further bleeding.     Results Review:    Results from last 7 days   Lab Units 03/04/25  0529   WBC 10*3/mm3 8.47   HEMOGLOBIN g/dL 9.5*   HEMATOCRIT % 30.3*   PLATELETS 10*3/mm3 189     Results from last 7 days   Lab Units 03/04/25  0529   SODIUM mmol/L 137   POTASSIUM mmol/L 3.9   CHLORIDE mmol/L 101   CO2 mmol/L 25.0   BUN mg/dL 23   CREATININE mg/dL 0.89   GLUCOSE mg/dL 96   CALCIUM mg/dL 8.3*     Imaging Results (Last 24 Hours)       ** No results found for the last 24 hours. **            Assessment:  Severe recalcitrant left epistaxis status post selective embolization-doing well      Acute posterior epistaxis       Plan:  Will remove packing later today.  Discussed postoperative wound care as well as use of saline nasal spray as well as humidification in order to reduce dryness.    I would encourage him to hold off on aspirin use indefinitely  Follow-up will be arranged as needed.      Abdias العلي MD  03/04/25  13:18 EST

## 2025-03-04 NOTE — CONSULTS
ENT H&P    Darien Camarena  [id number]  1935  Date of Consult 3/3/2025       Referring Provider: Dr. Hendrickson  Chief Complaint: Epistaxis    History of present illness: I was asked see this pleasant 89-year-old gentleman for recalcitrant epistaxis from the left side.  He had packing placed last week that was in place for over 3 days once this was removed he began to have severe bleeding in the posterior pharynx.  He had to be repacked with a posterior pack with 8 cm of air in order to quell the bleeding.  He takes a baby aspirin but is on no other anticoagulants.  He has previously had a Watchman procedure for atrial fibrillation.  He denies bleeding from other sites.        History  Past Medical History:   Diagnosis Date    Arrhythmia     Atrial fibrillation     Chronic kidney disease     COPD (chronic obstructive pulmonary disease)     Coronary artery disease     Dementia     Diabetes mellitus     doesnt check sugar    E. coli sepsis 06/23/2022    Enlarged prostate without lower urinary tract symptoms (luts) 06/20/2016    Full dentures     GERD (gastroesophageal reflux disease)     Gout     Hearing aid worn     bilat prn    History of colonoscopy 09/12/2012    History of radiation therapy 02/24/2023    SBRT LLL lung    [place] (hard of hearing)     hearing aids prn    Hyperlipidemia     Hypertension     Kidney stone     surgery x1    Lung cancer     STEVEN on CPAP     compliant with machine    PAF (paroxysmal atrial fibrillation)     Prostatism     Urinary incontinence     Urinary tract infection     Wears glasses     readers   ,   Past Surgical History:   Procedure Laterality Date    ATRIAL APPENDAGE EXCLUSION LEFT WITH TRANSESOPHAGEAL ECHOCARDIOGRAM N/A 11/28/2023    Procedure: Atrial Appendage Occlusion;  Surgeon: Anthony Mcdaniel MD;  Location: [facility] INVASIVE LOCATION;  Service: Cardiovascular;  Laterality: N/A;    CARDIAC CATHETERIZATION Left 09/29/2022    Procedure: Left Heart Cath;  Surgeon: Titus Oliveros  Last GARCIA IV, MD;  Location: [facility] CATH INVASIVE LOCATION;  Service: Cardiovascular;  Laterality: Left;    CARDIOVERSION      CATARACT EXTRACTION Bilateral     COLONOSCOPY      CYSTOSCOPY      ENDOSCOPY      possible    ENDOSCOPY N/A 2024    Procedure: ESOPHAGOGASTRODUODENOSCOPY;  Surgeon: Anthony Arambula MD;  Location:  [facility];  Service: Gastroenterology;  Laterality: N/A;  Esophagus dilated to 18mm with 12mm-mm to 15mm, then 15mm to 18mm balloon.    ENDOSCOPY N/A 10/31/2024    Procedure: ESOPHAGOGASTRODUODENOSCOPY WITH BIOPSY;  Surgeon: Carlos Dior MD;  Location:  [facility];  Service: Gastroenterology;  Laterality: N/A;    ERCP N/A 2024    Procedure: ENDOSCOPIC RETROGRADE CHOLANGIOPANCREATOGRAPHY;  Surgeon: Anthony Arambula MD;  Location:  [facility];  Service: Gastroenterology;  Laterality: N/A;  Sphincterotomy made to ampula of common bile duct (CBD), CBD swept with 9mm-12mm balloon - sludge, stones and purulent appearing drainage extracted. 10x7 Slovenian biliary stent depolyed into CBD. ERCP scope removed with balloon intact.    ERCP N/A 10/31/2024    Procedure: ENDOSCOPIC RETROGRADE CHOLANGIOPANCREATOGRAPHY;  Surgeon: Carlos Dior MD;  Location:  [facility];  Service: Gastroenterology;  Laterality: N/A;    KIDNEY STONE SURGERY      x1   ,   Family History   Problem Relation Age of Onset    Alzheimer's disease Mother     COPD Father     Lung cancer Father     Cancer Father     Kidney disease Father     No Known Problems Daughter     No Known Problems Daughter     No Known Problems Daughter    ,   Social History     Tobacco Use    Smoking status: Former     Current packs/day: 0.00     Average packs/day: 1 pack/day for 15.0 years (15.0 ttl pk-yrs)     Types: Cigarettes     Start date: 1947     Quit date: 1960     Years since quittin.8     Passive exposure: Past    Smokeless tobacco: Former     Types: Chew     Quit date:     Tobacco  comments:     started at 15 yo.   Vaping Use    Vaping status: Never Used    Passive vaping exposure: Yes   Substance Use Topics    Alcohol use: No    Drug use: Never   ,   Medications Prior to Admission   Medication Sig Dispense Refill Last Dose/Taking    albuterol sulfate  (90 Base) MCG/ACT inhaler Inhale 2 puffs Every 4 (Four) Hours As Needed for Wheezing. 54 g 1 Past Week    allopurinol (ZYLOPRIM) 300 MG tablet Take 1 tablet by mouth Daily. 90 tablet 1 3/2/2025    ascorbic acid (VITAMIN C) 1000 MG tablet Take 1 tablet by mouth Daily. (Patient taking differently: Take 1 tablet by mouth 2 (Two) Times a Day.)   3/2/2025    aspirin 81 MG EC tablet Take 1 tablet by mouth Daily.   3/2/2025    Coenzyme Q10 300 MG capsule Take 1 capsule by mouth Every Night.   3/2/2025    digoxin (LANOXIN) 125 MCG tablet Take 1 tablet by mouth Daily. 30 tablet 11 3/2/2025    docusate sodium (COLACE) 100 MG capsule Take 2 capsules by mouth Daily. (Patient taking differently: Take 2 capsules by mouth Daily As Needed for Constipation.)   Past Week    donepezil (ARICEPT) 10 MG tablet Take 1 tablet by mouth Every Night. 90 tablet 1 3/2/2025    Ferrous Gluconate (IRON) 240 (27 FE) MG tablet Take 1 tablet by mouth Daily.   3/2/2025    finasteride (PROSCAR) 5 MG tablet Take 1 tablet by mouth every night at bedtime. 90 tablet 1 3/2/2025    furosemide (LASIX) 20 MG tablet Take 1 tablet by mouth Daily. 90 tablet 1 3/2/2025    melatonin 5 MG tablet tablet Take 1 tablet by mouth At Night As Needed (insomnia / sleep).   Past Week    memantine (NAMENDA) 10 MG tablet Take 1 tablet by mouth 2 (Two) Times a Day. 180 tablet 1 3/2/2025    metFORMIN (GLUCOPHAGE) 1000 MG tablet Take 0.5 tablets by mouth 2 (Two) Times a Day With Meals.   3/2/2025    methenamine (HIPREX) 1 g tablet Take 1 tablet by mouth 2 (Two) Times a Day With Meals. 180 tablet 1 3/2/2025    metoprolol tartrate (LOPRESSOR) 50 MG tablet Take 1 tablet by mouth Every 12 (Twelve) Hours.  "120 tablet 5 3/2/2025    multivitamin with minerals (MULTIVITAMIN MEN 50+ PO) Take 1 tablet by mouth Every Night. Centrum Sliver   3/2/2025    omeprazole (priLOSEC) 20 MG capsule Take 2 capsules by mouth Every Morning.   3/2/2025    ondansetron (ZOFRAN) 4 MG tablet Take 1 tablet by mouth Every 6 (Six) Hours As Needed for Nausea or Vomiting.   Past Month    pravastatin (PRAVACHOL) 40 MG tablet Take 1 tablet by mouth Every Night.   3/2/2025    Probiotic Product (PROBIOTIC ADVANCED PO) Take 1 tablet by mouth 2 (Two) Times a Day.   3/2/2025    sertraline (ZOLOFT) 50 MG tablet Take 1 tablet by mouth Daily. 90 tablet 1 3/2/2025    tiotropium bromide-olodaterol (STIOLTO RESPIMAT) 2.5-2.5 MCG/ACT aerosol solution inhaler Inhale 2 puffs Daily. 2 inh once a day 3 each 3 3/2/2025    and Allergies:  Sglt2 inhibitors, Xarelto [rivaroxaban], and Penicillins    Objective     Vital Signs   /100 (BP Location: Left arm, Patient Position: Lying)   Pulse 87   Temp 98.8 °F (37.1 °C) (Oral)   Resp 16   Ht 190.5 cm (75\")   Wt 95.4 kg (210 lb 6.4 oz)   SpO2 96%   BMI 26.30 kg/m²     Physical Exam:     General Appearance:    Alert, cooperative, in no acute distress   Head:    Normocephalic, without obvious abnormality, atraumatic   Ears:   Nose:   Ears appear intact with no abnormalities noted  Nasal mucosa on right appears normal, posterior pack noted on the left side without active bleeding.   Throat:   No oral lesions, no thrush, oral mucosa moist, no bleeding noted in posterior pharynx   Neck:   No adenopathy, supple, trachea midline, no thyromegaly, no     carotid bruit, no JVD   Skin:   No bleeding, bruising or rash, he has typical vascular changes of upper extremities due to aging   Neurologic:   Cranial nerves 2 - 12 grossly intact, sensation intact                       Results Review:     Results from last 7 days   Lab Units 03/03/25  1107   WBC 10*3/mm3 8.79   HEMOGLOBIN g/dL 11.5*   HEMATOCRIT % 36.9*   PLATELETS " 10*3/mm3 205     Results from last 7 days   Lab Units 03/03/25  1107   SODIUM mmol/L 139   POTASSIUM mmol/L 4.5   CHLORIDE mmol/L 102   CO2 mmol/L 27.0   BUN mg/dL 23   CREATININE mg/dL 0.68*   CALCIUM mg/dL 8.9   BILIRUBIN mg/dL 0.8   ALK PHOS U/L 160*   ALT (SGPT) U/L 13   AST (SGOT) U/L 27   GLUCOSE mg/dL 121*       Imaging:  Imaging Results (Last 72 Hours)       ** No results found for the last 72 hours. **                  Assessment:  Recalcitrant epistaxis left posterior this is the second posterior pack he has had in place.  After removal of the pack this morning he had severe bleeding.  Dr. Bueno was able to place a posterior pack and get it under control but unfortunately it appears that this will be something that will require further intervention.  Hypertension    Acute posterior epistaxis  I will advance diet for now and he may need to be n.p.o. after midnight    Plan:  We will hold his baby aspirin indefinitely.  I discussed with Mr. Camarena and his daughter the recommendation for selective embolization of the internal maxillary artery on the left.  Alternative therapy would be operating room visit which would be very difficult and invasive for him.  I will consult with Dr. Eldridge to see if he is able to assist us in this matter.        Abdias العلي MD  03/03/25  16:19 EST

## 2025-03-04 NOTE — PLAN OF CARE
Problem: Adult Inpatient Plan of Care  Goal: Absence of Hospital-Acquired Illness or Injury  Intervention: Identify and Manage Fall Risk  Recent Flowsheet Documentation  Taken 3/4/2025 0428 by Tali Rodriguez RN  Safety Promotion/Fall Prevention:   activity supervised   assistive device/personal items within reach   clutter free environment maintained   fall prevention program maintained   lighting adjusted   nonskid shoes/slippers when out of bed   room organization consistent   safety round/check completed  Taken 3/4/2025 0228 by Tali Rodriguez RN  Safety Promotion/Fall Prevention:   activity supervised   assistive device/personal items within reach   clutter free environment maintained   fall prevention program maintained   lighting adjusted   nonskid shoes/slippers when out of bed   room organization consistent   safety round/check completed  Taken 3/4/2025 0028 by Tali Rodriguez RN  Safety Promotion/Fall Prevention:   activity supervised   assistive device/personal items within reach   clutter free environment maintained   fall prevention program maintained   lighting adjusted   nonskid shoes/slippers when out of bed   room organization consistent   safety round/check completed  Taken 3/3/2025 2228 by Tali Rodriguez RN  Safety Promotion/Fall Prevention:   activity supervised   assistive device/personal items within reach   clutter free environment maintained   fall prevention program maintained   lighting adjusted   nonskid shoes/slippers when out of bed   room organization consistent   safety round/check completed  Intervention: Prevent Skin Injury  Recent Flowsheet Documentation  Taken 3/4/2025 0428 by Tali Rodriguez, RN  Body Position: position changed independently  Taken 3/4/2025 0228 by Tali Rodriguez, RN  Body Position: position changed independently  Taken 3/4/2025 0028 by Tali Rodriguez RN  Body Position: position changed independently  Taken 3/3/2025 2228 by Tali Rodriguez  RN  Body Position: position changed independently  Intervention: Prevent Infection  Recent Flowsheet Documentation  Taken 3/4/2025 0428 by Tali Rodriguez RN  Infection Prevention:   environmental surveillance performed   hand hygiene promoted   rest/sleep promoted   single patient room provided  Taken 3/4/2025 0228 by Tali Rodriguez RN  Infection Prevention:   environmental surveillance performed   hand hygiene promoted   rest/sleep promoted   single patient room provided  Taken 3/4/2025 0028 by Tali Rodriguez RN  Infection Prevention:   environmental surveillance performed   hand hygiene promoted   rest/sleep promoted   single patient room provided  Taken 3/3/2025 2228 by Tali Rodriguez, RN  Infection Prevention:   environmental surveillance performed   hand hygiene promoted   rest/sleep promoted   single patient room provided  Goal: Optimal Comfort and Wellbeing  Intervention: Provide Person-Centered Care  Recent Flowsheet Documentation  Taken 3/3/2025 2228 by Tali Rodriguez RN  Trust Relationship/Rapport:   care explained   choices provided   questions answered   questions encouraged   thoughts/feelings acknowledged   Goal Outcome Evaluation:

## 2025-03-04 NOTE — CONSULTS
NAME: UMAIR PRUETT  : 1935  PCP: Pito Way MD  Attending MD: Gracia Tee*    Date of Admission:  3/3/2025  Date of Service: 3/4/2025    CC:   Chief Complaint   Patient presents with    Nose Bleed       History of Present Illness:  89 y.o.  male with a history of HTN, HLD, A-fib (s/p watchman, no anticoagulation), CKD, and COPD.  He presented to [facility] ED on 3/3/2025 with recurrent posterior nosebleed.  He reports that he has been experiencing nosebleeds for years, however they have been increasing in frequency over the past few months.  His current nosebleed began almost a week ago.  He currently has nasal packing in place.  Hemoglobin is stable. Neurointervention has been consulted for potential embolization for recurrent epistaxis.    Past Medical History:  Past Medical History:   Diagnosis Date    Arrhythmia     Atrial fibrillation     Chronic kidney disease     COPD (chronic obstructive pulmonary disease)     Coronary artery disease     Dementia     Diabetes mellitus     doesnt check sugar    E. coli sepsis 2022    Enlarged prostate without lower urinary tract symptoms (luts) 2016    Full dentures     GERD (gastroesophageal reflux disease)     Gout     Hearing aid worn     bilat prn    History of colonoscopy 2012    History of radiation therapy 2023    SBRT LLL lung    [place]'aq (hard of hearing)     hearing aids prn    Hyperlipidemia     Hypertension     Kidney stone     surgery x1    Lung cancer     STEVEN on CPAP     compliant with machine    PAF (paroxysmal atrial fibrillation)     Prostatism     Urinary incontinence     Urinary tract infection     Wears glasses     readers       Past Surgical History:  Past Surgical History:   Procedure Laterality Date    ATRIAL APPENDAGE EXCLUSION LEFT WITH TRANSESOPHAGEAL ECHOCARDIOGRAM N/A 2023    Procedure: Atrial Appendage Occlusion;  Surgeon: Anthony Mcdaniel MD;  Location: [facility] INVASIVE LOC[facility];  Service:  Cardiovascular;  Laterality: N/A;    CARDIAC CATHETERIZATION Left 09/29/2022    Procedure: Left Heart Cath;  Surgeon: Titus Oliveros IV, MD;  Location:  [facility] C[facility] INVASIVE LOCATION;  Service: Cardiovascular;  Laterality: Left;    CARDIOVERSION      CATARACT EXTRACTION Bilateral     COLONOSCOPY      CYSTOSCOPY      ENDOSCOPY      possible    ENDOSCOPY N/A 9/5/2024    Procedure: ESOPHAGOGASTRODUODENOSCOPY;  Surgeon: Anthony Arambula MD;  Location:  [facility];  Service: Gastroenterology;  Laterality: N/A;  Esophagus dilated to 18mm with 12mm-mm to 15mm, then 15mm to 18mm balloon.    ENDOSCOPY N/A 10/31/2024    Procedure: ESOPHAGOGASTRODUODENOSCOPY WITH BIOPSY;  Surgeon: Carlos Dior MD;  Location:  [facility];  Service: Gastroenterology;  Laterality: N/A;    ERCP N/A 9/5/2024    Procedure: ENDOSCOPIC RETROGRADE CHOLANGIOPANCREATOGRAPHY;  Surgeon: Anthony Arambula MD;  Location:  [facility];  Service: Gastroenterology;  Laterality: N/A;  Sphincterotomy made to ampula of common bile duct (CBD), CBD swept with 9mm-12mm balloon - sludge, stones and purulent appearing drainage extracted. 10x7 french biliary stent depolyed into CBD. ERCP scope removed with balloon intact.    ERCP N/A 10/31/2024    Procedure: ENDOSCOPIC RETROGRADE CHOLANGIOPANCREATOGRAPHY;  Surgeon: Carlos Dior MD;  Location:  [facility];  Service: Gastroenterology;  Laterality: N/A;    KIDNEY STONE SURGERY      x1         Medications  Medications Prior to Admission   Medication Sig Dispense Refill Last Dose/Taking    albuterol sulfate  (90 Base) MCG/ACT inhaler Inhale 2 puffs Every 4 (Four) Hours As Needed for Wheezing. 54 g 1 Past Week    allopurinol (ZYLOPRIM) 300 MG tablet Take 1 tablet by mouth Daily. 90 tablet 1 3/2/2025    ascorbic acid (VITAMIN C) 1000 MG tablet Take 1 tablet by mouth Daily. (Patient taking differently: Take 1 tablet by mouth 2 (Two) Times a Day.)   3/2/2025     aspirin 81 MG EC tablet Take 1 tablet by mouth Daily.   3/2/2025    Coenzyme Q10 300 MG capsule Take 1 capsule by mouth Every Night.   3/2/2025    digoxin (LANOXIN) 125 MCG tablet Take 1 tablet by mouth Daily. 30 tablet 11 3/2/2025    docusate sodium (COLACE) 100 MG capsule Take 2 capsules by mouth Daily. (Patient taking differently: Take 2 capsules by mouth Daily As Needed for Constipation.)   Past Week    donepezil (ARICEPT) 10 MG tablet Take 1 tablet by mouth Every Night. 90 tablet 1 3/2/2025    Ferrous Gluconate (IRON) 240 (27 FE) MG tablet Take 1 tablet by mouth Daily.   3/2/2025    finasteride (PROSCAR) 5 MG tablet Take 1 tablet by mouth every night at bedtime. 90 tablet 1 3/2/2025    furosemide (LASIX) 20 MG tablet Take 1 tablet by mouth Daily. 90 tablet 1 3/2/2025    melatonin 5 MG tablet tablet Take 1 tablet by mouth At Night As Needed (insomnia / sleep).   Past Week    memantine (NAMENDA) 10 MG tablet Take 1 tablet by mouth 2 (Two) Times a Day. 180 tablet 1 3/2/2025    metFORMIN (GLUCOPHAGE) 1000 MG tablet Take 0.5 tablets by mouth 2 (Two) Times a Day With Meals.   3/2/2025    methenamine (HIPREX) 1 g tablet Take 1 tablet by mouth 2 (Two) Times a Day With Meals. 180 tablet 1 3/2/2025    metoprolol tartrate (LOPRESSOR) 50 MG tablet Take 1 tablet by mouth Every 12 (Twelve) Hours. 120 tablet 5 3/2/2025    multivitamin with minerals (MULTIVITAMIN MEN 50+ PO) Take 1 tablet by mouth Every Night. Centrum Sliver   3/2/2025    omeprazole (priLOSEC) 20 MG capsule Take 2 capsules by mouth Every Morning.   3/2/2025    ondansetron (ZOFRAN) 4 MG tablet Take 1 tablet by mouth Every 6 (Six) Hours As Needed for Nausea or Vomiting.   Past Month    pravastatin (PRAVACHOL) 40 MG tablet Take 1 tablet by mouth Every Night.   3/2/2025    Probiotic Product (PROBIOTIC ADVANCED PO) Take 1 tablet by mouth 2 (Two) Times a Day.   3/2/2025    sertraline (ZOLOFT) 50 MG tablet Take 1 tablet by mouth Daily. 90 tablet 1 3/2/2025     tiotropium bromide-olodaterol (STIOLTO RESPIMAT) 2.5-2.5 MCG/ACT aerosol solution inhaler Inhale 2 puffs Daily. 2 inh once a day 3 each 3 3/2/2025       Allergies:  Allergies   Allergen Reactions    Sglt2 Inhibitors Other (See Comments)     Recurrent UTI    Xarelto [Rivaroxaban] GI Bleeding     GI bleed    Penicillins Hives     Has tolerated cefepime, ceftriaxone, cefazolin, cefdinir, cefuroxime, cephalexin       Social Hx:  Social History     Socioeconomic History    Marital status:    Tobacco Use    Smoking status: Former     Current packs/day: 0.00     Average packs/day: 1 pack/day for 15.0 years (15.0 ttl pk-yrs)     Types: Cigarettes     Start date: 1947     Quit date: 1960     Years since quittin.8     Passive exposure: Past    Smokeless tobacco: Former     Types: Chew     Quit date:     Tobacco comments:     started at 15 yo.   Vaping Use    Vaping status: Never Used    Passive vaping exposure: Yes   Substance and Sexual Activity    Alcohol use: No    Drug use: Never    Sexual activity: Not Currently     Partners: Female       Family Hx:  Family History   Problem Relation Age of Onset    Alzheimer's disease Mother     COPD Father     Lung cancer Father     Cancer Father     Kidney disease Father     No Known Problems Daughter     No Known Problems Daughter     No Known Problems Daughter        Review of Imaging: N/A      Laboratory Result:  Lab Results   Component Value Date    WBC 8.47 2025    HGB 9.5 (L) 2025    HCT 30.3 (L) 2025    MCV 95.0 2025     2025     Lab Results   Component Value Date    GLUCOSE 96 2025    CALCIUM 8.3 (L) 2025     2025    K 3.9 2025    CO2 25.0 2025     2025    BUN 23 2025    CREATININE 0.89 2025    EGFRIFNONA 79 2021    BCR 25.8 (H) 2025    ANIONGAP 11.0 2025     Lab Results   Component Value Date    HGBA1C 5.80 (H) 2025     Lab Results    Component Value Date    CHOL 108 02/17/2025    TRIG 134 02/17/2025    HDL 32 (L) 02/17/2025    LDL 52 02/17/2025       Physical Examination:  Vitals:    03/04/25 0700   BP: (!) 153/107   Pulse: 73   Resp: 18   Temp: 98.1 °F (36.7 °C)   SpO2: 94%        General Appearance:   Well developed, well nourished, well groomed, alert, and cooperative.    Neurological examination: AAOx3. Nonfocal neurological exam. Speech is clear. Nasal packing in place in left nare. Bleeding controlled.      Diagnoses/Plan:    Mr. Camarena is a 89 y.o. male with recurrent posterior epistaxis.  He is not anticoagulated.  He would likely be a good candidate for embolization due to recurrent epistaxis.  This was discussed in detail with Mr. Camarena, including risks and benefits, and he wishes to proceed.  Will tentatively plan for coil embolization for recurrent posterior epistaxis later this morning with Dr. Eldridge (ideally via right radial access). He has been npo since midnight. His nasal packing will ideally be removed later this afternoon by ENT.    Deanne Marquez PA-C  3/4/2025

## 2025-03-05 ENCOUNTER — READMISSION MANAGEMENT (OUTPATIENT)
Dept: CALL CENTER | Facility: HOSPITAL | Age: 89
End: 2025-03-05
Payer: MEDICARE

## 2025-03-05 VITALS
RESPIRATION RATE: 17 BRPM | SYSTOLIC BLOOD PRESSURE: 150 MMHG | BODY MASS INDEX: 26.16 KG/M2 | WEIGHT: 210.4 LBS | DIASTOLIC BLOOD PRESSURE: 84 MMHG | HEART RATE: 69 BPM | TEMPERATURE: 98.1 F | OXYGEN SATURATION: 95 % | HEIGHT: 75 IN

## 2025-03-05 PROBLEM — E44.0 MODERATE PROTEIN-CALORIE MALNUTRITION: Status: ACTIVE | Noted: 2025-03-05

## 2025-03-05 LAB
GLUCOSE BLDC GLUCOMTR-MCNC: 103 MG/DL (ref 70–130)
GLUCOSE BLDC GLUCOMTR-MCNC: 104 MG/DL (ref 70–130)
GLUCOSE BLDC GLUCOMTR-MCNC: 160 MG/DL (ref 70–130)
GLUCOSE BLDC GLUCOMTR-MCNC: 173 MG/DL (ref 70–130)
HCT VFR BLD AUTO: 30.1 % (ref 37.5–51)
HCT VFR BLD AUTO: 31.8 % (ref 37.5–51)
HGB BLD-MCNC: 10.2 G/DL (ref 13–17.7)
HGB BLD-MCNC: 9.6 G/DL (ref 13–17.7)

## 2025-03-05 PROCEDURE — 82948 REAGENT STRIP/BLOOD GLUCOSE: CPT

## 2025-03-05 PROCEDURE — 63710000001 REVEFENACIN 175 MCG/3ML SOLUTION

## 2025-03-05 PROCEDURE — 94799 UNLISTED PULMONARY SVC/PX: CPT

## 2025-03-05 PROCEDURE — 63710000001 INSULIN LISPRO (HUMAN) PER 5 UNITS: Performed by: STUDENT IN AN ORGANIZED HEALTH CARE EDUCATION/TRAINING PROGRAM

## 2025-03-05 PROCEDURE — 85018 HEMOGLOBIN: CPT

## 2025-03-05 PROCEDURE — 99238 HOSP IP/OBS DSCHRG MGMT 30/<: CPT | Performed by: STUDENT IN AN ORGANIZED HEALTH CARE EDUCATION/TRAINING PROGRAM

## 2025-03-05 PROCEDURE — 94664 DEMO&/EVAL PT USE INHALER: CPT

## 2025-03-05 PROCEDURE — 85014 HEMATOCRIT: CPT

## 2025-03-05 RX ORDER — INSULIN LISPRO 100 [IU]/ML
2-9 INJECTION, SOLUTION INTRAVENOUS; SUBCUTANEOUS
Status: DISCONTINUED | OUTPATIENT
Start: 2025-03-05 | End: 2025-03-05 | Stop reason: HOSPADM

## 2025-03-05 RX ADMIN — REVEFENACIN 175 MCG: 175 SOLUTION RESPIRATORY (INHALATION) at 08:39

## 2025-03-05 RX ADMIN — DIGOXIN 125 MCG: 125 TABLET ORAL at 08:46

## 2025-03-05 RX ADMIN — INSULIN LISPRO 2 UNITS: 100 INJECTION, SOLUTION INTRAVENOUS; SUBCUTANEOUS at 12:01

## 2025-03-05 RX ADMIN — INSULIN LISPRO 2 UNITS: 100 INJECTION, SOLUTION INTRAVENOUS; SUBCUTANEOUS at 08:46

## 2025-03-05 RX ADMIN — METOPROLOL TARTRATE 50 MG: 50 TABLET, FILM COATED ORAL at 08:46

## 2025-03-05 RX ADMIN — ALLOPURINOL 300 MG: 300 TABLET ORAL at 08:46

## 2025-03-05 RX ADMIN — Medication 10 ML: at 08:49

## 2025-03-05 RX ADMIN — ARFORMOTEROL TARTRATE 15 MCG: 15 SOLUTION RESPIRATORY (INHALATION) at 08:39

## 2025-03-05 RX ADMIN — PANTOPRAZOLE SODIUM 40 MG: 40 TABLET, DELAYED RELEASE ORAL at 06:13

## 2025-03-05 RX ADMIN — SERTRALINE HYDROCHLORIDE 50 MG: 50 TABLET ORAL at 08:46

## 2025-03-05 RX ADMIN — AMOXICILLIN AND CLAVULANATE POTASSIUM 1 TABLET: 875; 125 TABLET, FILM COATED ORAL at 08:46

## 2025-03-05 RX ADMIN — MEMANTINE 10 MG: 10 TABLET ORAL at 08:46

## 2025-03-05 NOTE — PLAN OF CARE
Problem: Adult Inpatient Plan of Care  Goal: Absence of Hospital-Acquired Illness or Injury  Intervention: Identify and Manage Fall Risk  Recent Flowsheet Documentation  Taken 3/5/2025 0400 by Tali Rodriguez, RN  Safety Promotion/Fall Prevention:   activity supervised   assistive device/personal items within reach   clutter free environment maintained   fall prevention program maintained   lighting adjusted   nonskid shoes/slippers when out of bed   safety round/check completed   room organization consistent  Taken 3/5/2025 0200 by Tali Rodriguez, RN  Safety Promotion/Fall Prevention:   activity supervised   assistive device/personal items within reach   clutter free environment maintained   fall prevention program maintained   lighting adjusted   nonskid shoes/slippers when out of bed   room organization consistent   safety round/check completed  Taken 3/5/2025 0000 by Tali Rodriguez, RN  Safety Promotion/Fall Prevention:   activity supervised   assistive device/personal items within reach   clutter free environment maintained   fall prevention program maintained   lighting adjusted   nonskid shoes/slippers when out of bed   room organization consistent   safety round/check completed  Taken 3/4/2025 2200 by Tali Rodriguez, RN  Safety Promotion/Fall Prevention:   activity supervised   assistive device/personal items within reach   clutter free environment maintained   fall prevention program maintained   lighting adjusted   nonskid shoes/slippers when out of bed   room organization consistent   safety round/check completed  Taken 3/4/2025 2000 by Tali Rodriguez, RN  Safety Promotion/Fall Prevention:   activity supervised   assistive device/personal items within reach   clutter free environment maintained   fall prevention program maintained   lighting adjusted   nonskid shoes/slippers when out of bed   room organization consistent   safety round/check completed  Intervention: Prevent Skin Injury  Recent  Flowsheet Documentation  Taken 3/5/2025 0400 by Tali Rodriguez RN  Body Position: position changed independently  Taken 3/5/2025 0200 by Tali Rodriguez RN  Body Position: position changed independently  Taken 3/5/2025 0000 by Tali Rodriguez RN  Body Position: position changed independently  Taken 3/4/2025 2200 by Tali Rodriguez RN  Body Position: position changed independently  Taken 3/4/2025 2000 by Tali Rodriguez RN  Body Position: position changed independently  Intervention: Prevent Infection  Recent Flowsheet Documentation  Taken 3/5/2025 0400 by Tali Rodriguez RN  Infection Prevention:   environmental surveillance performed   hand hygiene promoted   rest/sleep promoted   single patient room provided  Taken 3/5/2025 0200 by Tali Rodriguez RN  Infection Prevention:   environmental surveillance performed   hand hygiene promoted   rest/sleep promoted   single patient room provided  Taken 3/5/2025 0000 by Tali Rodriguez RN  Infection Prevention:   environmental surveillance performed   hand hygiene promoted   rest/sleep promoted   single patient room provided  Taken 3/4/2025 2200 by Tali Rodriguez RN  Infection Prevention:   environmental surveillance performed   hand hygiene promoted   rest/sleep promoted   single patient room provided  Taken 3/4/2025 2000 by Tali Rodriguez RN  Infection Prevention:   environmental surveillance performed   hand hygiene promoted   rest/sleep promoted   single patient room provided  Goal: Optimal Comfort and Wellbeing  Intervention: Provide Person-Centered Care  Recent Flowsheet Documentation  Taken 3/4/2025 2000 by Tali Rodriguez RN  Trust Relationship/Rapport:   care explained   choices provided   questions answered   questions encouraged   thoughts/feelings acknowledged     Problem: Fall Injury Risk  Goal: Absence of Fall and Fall-Related Injury  Intervention: Promote Injury-Free Environment  Recent Flowsheet Documentation  Taken 3/5/2025 0400 by  Tali Rodriguez, RN  Safety Promotion/Fall Prevention:   activity supervised   assistive device/personal items within reach   clutter free environment maintained   fall prevention program maintained   lighting adjusted   nonskid shoes/slippers when out of bed   safety round/check completed   room organization consistent  Taken 3/5/2025 0200 by Tali Rodriguez, RN  Safety Promotion/Fall Prevention:   activity supervised   assistive device/personal items within reach   clutter free environment maintained   fall prevention program maintained   lighting adjusted   nonskid shoes/slippers when out of bed   room organization consistent   safety round/check completed  Taken 3/5/2025 0000 by Tali Rodriguez, RN  Safety Promotion/Fall Prevention:   activity supervised   assistive device/personal items within reach   clutter free environment maintained   fall prevention program maintained   lighting adjusted   nonskid shoes/slippers when out of bed   room organization consistent   safety round/check completed  Taken 3/4/2025 2200 by Tali Rodriguez, RN  Safety Promotion/Fall Prevention:   activity supervised   assistive device/personal items within reach   clutter free environment maintained   fall prevention program maintained   lighting adjusted   nonskid shoes/slippers when out of bed   room organization consistent   safety round/check completed  Taken 3/4/2025 2000 by Tali Rodriguez, RN  Safety Promotion/Fall Prevention:   activity supervised   assistive device/personal items within reach   clutter free environment maintained   fall prevention program maintained   lighting adjusted   nonskid shoes/slippers when out of bed   room organization consistent   safety round/check completed   Goal Outcome Evaluation:

## 2025-03-05 NOTE — OUTREACH NOTE
Prep Survey      Flowsheet Row Responses   [facility] patient discharged from? [place]   Is LACE score < 7 ? No   Eligibility [facility]   Date of Admission 03/03/25   Date of Discharge 03/05/25   Discharge Disposition Home or Self Care   Discharge diagnosis Acute posterior epistaxis,  Coil Embolization - Epitaxis   Does the patient have one of the following disease processes/diagnoses(primary or secondary)? Other   Does the patient have Home health ordered? No   Is there a DME ordered? No   Prep survey completed? Yes            Patrica ROTH - Registered Nurse

## 2025-03-05 NOTE — DISCHARGE SUMMARY
[facility] Medicine Services  DISCHARGE SUMMARY    Patient Name: Darien Camarena  : 1935  MRN: [id number]    Date of Admission: 3/3/2025 10:33 AM  Date of Discharge: 3/5/2025  Primary Care Physician: Pito Way MD    Consults       Date and Time Order Name Status Description    3/3/2025 12:48 PM Inpatient ENT Consult              Hospital Course     Presenting Problem: Recurrent posterior nosebleed    Active Hospital Problems    Diagnosis  POA    **Acute posterior epistaxis [R04.0]  Yes    Moderate protein-calorie malnutrition [E44.0]  Yes      Resolved Hospital Problems   No resolved problems to display.          Hospital Course:  Darien Camarena is a 89 y.o. male with hypertension, hyperlipidemia, HFrEF, A-fib status post Watchman (history of bleeding), type 2 diabetes, CKD 3, chronic urinary tension with chronic Del Cid, GERD, depression and COPD who presented for recurrent posterior nosebleed.    Recurrent posterior nosebleed  -Ongoing problem for many many years  -Evaluated by ENT who recommended embolization, neurosurgery consulted.  He underwent embolization on 3/4.  ENT remove packing later that day with no recurrence of bleeding  -Patient did have significant hemoglobin drop from admission from 11.5 to 9.2.  Overnight hemoglobin has been stable with no recurrent bleeding  -Will continue course of Augmentin for 5 days to cover bacterial sinusitis per ENT recommendations  -No need to follow-up with neurosurgery  -ENT follow-up as needed  -Continue the use of saline nasal spray and hemofiltration per ENT recs.  -ENT also recommended holding off on patient's aspirin.  Discussed with patient and daughter at bedside.  Hold off on aspirin for now and they will discuss further at follow-ups with cardiology      Discharge Follow Up Recommendations for outpatient labs/diagnostics:  PCP follow-up 1 week  ENT follow-up as needed    Day of Discharge     HPI:   Patient  seen and examined on the morning of discharge.  He reports sleeping poorly but otherwise feeling well.  No recurrent bleeding.  He feels comfortable discharging today.    Review of Systems   Respiratory:  Negative for cough and shortness of breath.    Cardiovascular:  Negative for chest pain.   Gastrointestinal:  Negative for abdominal pain, nausea and vomiting.         Vital Signs:   Temp:  [97.8 °F (36.6 °C)-99.2 °F (37.3 °C)] 98 °F (36.7 °C)  Heart Rate:  [76-92] 76  Resp:  [14-18] 16  BP: (125-165)/() 142/95      Physical Exam  Constitutional:       General: He is not in acute distress.     Appearance: He is not ill-appearing.   HENT:      Head: Normocephalic and atraumatic.      Nose: Nose normal.      Comments: No signs of recurrent bleeding  Pulmonary:      Effort: Pulmonary effort is normal. No respiratory distress.   Neurological:      Mental Status: Mental status is at baseline.          Pertinent  and/or Most Recent Results     LAB RESULTS:      Lab 03/05/25  0733 03/05/25  0133 03/04/25  1605 03/04/25  0529 03/04/25  0008 03/03/25  1625 03/03/25  1107   WBC  --   --   --  8.47  --   --  8.79   HEMOGLOBIN 10.2* 9.6* 9.3* 9.5* 9.2*   < > 11.5*   HEMATOCRIT 31.8* 30.1* 29.3* 30.3* 29.0*   < > 36.9*   PLATELETS  --   --   --  189  --   --  205   NEUTROS ABS  --   --   --  6.07  --   --  6.67   IMMATURE GRANS (ABS)  --   --   --  0.05  --   --  0.06*   LYMPHS ABS  --   --   --  1.33  --   --  1.18   MONOS ABS  --   --   --  0.65  --   --  0.53   EOS ABS  --   --   --  0.32  --   --  0.28   MCV  --   --   --  95.0  --   --  95.8    < > = values in this interval not displayed.         Lab 03/04/25  0529 03/03/25  1107   SODIUM 137 139   POTASSIUM 3.9 4.5   CHLORIDE 101 102   CO2 25.0 27.0   ANION GAP 11.0 10.0   BUN 23 23   CREATININE 0.89 0.68*   EGFR 82.4 89.4   GLUCOSE 96 121*   CALCIUM 8.3* 8.9   MAGNESIUM 2.2 1.8   TSH  --  2.960         Lab 03/03/25  1107   TOTAL PROTEIN 6.9   ALBUMIN 3.0*    GLOBULIN 3.9   ALT (SGPT) 13   AST (SGOT) 27   BILIRUBIN 0.8   ALK PHOS 160*                 Lab 03/03/25  1625 03/03/25  1107   ABO TYPING A A   RH TYPING Positive Positive   ANTIBODY SCREEN  --  Negative         Brief Urine Lab Results  (Last result in the past 365 days)        Color   Clarity   Blood   Leuk Est   Nitrite   Protein   CREAT   Urine HCG        02/17/25 1440 Dark Yellow   Cloudy   250 Meet/ul   500 Germaine/ul   Negative   Trace                 Microbiology Results (last 10 days)       ** No results found for the last 240 hours. **            Invasive peripheral vascular study    Result Date: 3/5/2025  Clinical Indication: 89-year-old male with recurrent, refractory posterior left epistaxis. : Dr. Bipin Eldridge. Access: Right radial artery.  Ultrasound was used to identify the right radial artery for access. It was patent, appropriate for catheterization, and used for guidance of the micropuncture. Successful cannulation was achieved and images were saved to PACS for review. Conscious sedation: Moderate sedation was provided by me for a total of 72 minutes.  Physical vitals were continuously monitored by an independently trained observer.  A total of 2 mg of Versed and 50 ug of fentanyl were administered intravenously. Estimated blood loss: Negligible Complication: None apparent. Procedures: 1.  Selective bilateral common carotid artery catheterization with subsequent diagnostic bilateral extracranial and intracranial carotid angiography. 2.  Selective bilateral internal maxillary artery microcatheterization (third order innominate/carotid), with microcatheter angiography, for intervention 3.  Extracranial, head/neck embolization, bilateral internal maxillary arteries for epistaxis.  4.  Follow-up angiography after embolization x 2 5.  Ultrasound guided arterial access, right radial artery 6.  Conscious sedation Technique/Findings: Formal written consent for the procedure was obtained from the  "patient/patient's family after explained risks to include bleeding, infection, contrast reaction, vascular injury, and stroke.  The right wrist region was prepped and draped in the usual, sterile fashion.  Local anesthesia with 1% lidocaine infiltration was achieved.  Additionally, intravenous fentanyl and Versed were given for patient comfort throughout the procedure, the details of which are documented in the nursing record.  Utilizing Ultrasound guidance and Seldinger technique, a 5 [place] vascular sheath was placed into the right radial artery without difficulty.  A radial \"cocktail\" was then administered, per protocol, the details of which are documented in the nursing record.  Subsequently, a Daojia intermediate catheter was advanced over the Penumbra 5 [place] Heart 2 catheter into the aortic arch and used to select the left common carotid artery under fluoroscopic guidance.  Appropriate angiographic sequences were filmed following contrast injection. Selective left common carotid artery catheterization and angiography: The cervical portions of the left carotid vasculature demonstrate no significant plaque formation at the carotid bifurcation and no appreciable stenosis utilizing NASCET criteria.  The intracranial circulation was opacified via a common carotid injection, demonstrating no aneurysm, vascular malformation, significant stenosis, nor large vessel occlusion.  There are no changes of vasculitis nor vasospasm.  No vascular anomalies are identified, specifically there is a traditional origin of the ophthalmic artery from the internal carotid artery.  The dural venous sinuses are patent. With the Benchmark intermediate catheter purchased in the distal cervical left common carotid artery, a Renegade microcatheter was navigated over a Transcend EX soft-tip microwire into the distal left internal maxillary artery without difficulty.  Left internal maxillary artery microcatheter angiography " "demonstrates no active contrast extravasation, pseudoaneurysm, or other \"culprit\" lesion.  Given the patient's refractory/recurrent left-sided posterior epistaxis, the internal maxillary artery was subsequently embolized with PVA particles ranging in size from 250-350 µm, followed by placement of 3 Target XL microcoil's.  Follow-up angiography demonstrates stagnant contrast in occlusion of the left internal maxillary artery with marked reduction in vascular blush within the nasal cavity. The Benchmark intermediate catheter was then advanced over the Heart 2 catheter into the right common carotid artery, but would not advance past the right common carotid origin secondary to vessel tortuosity.  Subsequently, a 5 [place] Heart 2 catheter was advanced into the aortic arch and used to engage the right common carotid artery without difficulty.  Appropriate angiographic sequences were filmed following contrast injection. Selective right common carotid artery catheterization and angiography: The cervical portions of the right carotid vasculature demonstrate no significant plaque formation at the carotid bifurcation and no appreciable stenosis utilizing NASCET criteria.  There are mild changes of fibromuscular dysplasia noted within the mid cervical right internal carotid artery, but without dissection or complicating feature.  The intracranial circulation was opacified via a right common carotid injection, demonstrating no aneurysm, vascular malformation, significant stenosis, nor large vessel occlusion.  There are no changes of vasculitis nor vasospasm.  No vascular anomalies are identified, specifically there is a traditional origin of the ophthalmic artery from the internal carotid artery. With the Heart 2 catheter purchased in the right common carotid artery, the Renegade microcatheter was navigated over a Transcend EX soft-tip microwire into the distal right internal maxillary artery without difficulty.  Right " "internal maxillary artery microcatheter angiography demonstrates no active contrast extravasation, pseudoaneurysm, or other \"culprit\" lesion.  Given the patient's refractory/recurrent posterior epistaxis, the distal right internal maxillary artery was subsequently embolized with PVA particles ranging in size from 250-350 µm.  Follow-up angiography demonstrates stagnant contrast within the distal right internal maxillary artery with marked reduction in vascular blush within the nasal cavity. At the end of the procedure, all catheters and sheaths were removed from the wrist and hemostasis was achieved at the puncture site utilizing manual compression and placement of a radial \"TR band\".  The patient tolerated the procedure well without apparent complication.     Unremarkable carotid and cerebral angiograms.  Specifically, there is no active contrast extravasation, pseudoaneurysm, vascular lesion, or other \"culprit\" lesion to account for the patient's recurrent, refractory left-sided epistaxis.  Given the recurrent/refractory epistaxis, the distal bilateral internal maxillary arteries were prophylactically embolized with PVA particles and Target microcoil's, as detailed above.              Results for orders placed during the hospital encounter of 11/21/24    Adult Transesophageal Echo 3D (FARHAD) W/ Cont If Necessary Per Protocol    Interpretation Summary    There is a 27mm Watchman FLX device in place. It appears well seated and well compressed with no device related thrombus seen. There is a small jet of color flow, 2.5 mm, at the superior aspect adjacent to the left upper pulmonary vein. This was not seen previous examination however image quality is improved compared to prior.    Left ventricular systolic function is moderately decreased. Left ventricular ejection fraction appears to be 36 - 40%. Normal left ventricular wall thickness noted. The left ventricular cavity is dilated. There is left ventricular global " hypokinesis noted.    : The right ventricular cavity is mildly dilated. Mildly reduced right ventricular systolic function noted.    : The left atrial cavity is severely dilated. No evidence of a left atrial thrombus present. There is light spontaneous echo contrast present in the atrial body.    The right atrial cavity is severely dilated.    There is mild, bileaflet mitral valve thickening present. Mild mitral valve regurgitation is present. No significant mitral valve stenosis is present.    Mild tricuspid valve regurgitation is present. Estimated right ventricular systolic pressure from tricuspid regurgitation is mildly elevated (35-45 mmHg).    There is moderate pulmonic valve regurgitation present.      Plan for Follow-up of Pending Labs/Results:     Discharge Details        Discharge Medications        New Medications        Instructions Start Date   amoxicillin-clavulanate 875-125 MG per tablet  Commonly known as: AUGMENTIN   1 tablet, Oral, Every 12 Hours Scheduled             Continue These Medications        Instructions Start Date   albuterol sulfate  (90 Base) MCG/ACT inhaler  Commonly known as: PROVENTIL HFA;VENTOLIN HFA;PROAIR HFA   2 puffs, Inhalation, Every 4 Hours PRN      allopurinol 300 MG tablet  Commonly known as: ZYLOPRIM   300 mg, Oral, Daily      ascorbic acid 1000 MG tablet  Commonly known as: VITAMIN C   1,000 mg, Oral, Daily      Coenzyme Q10 300 MG capsule   1 capsule, Nightly      digoxin 125 MCG tablet  Commonly known as: LANOXIN   125 mcg, Oral, Daily      docusate sodium 100 MG capsule  Commonly known as: COLACE   200 mg, Oral, Daily      donepezil 10 MG tablet  Commonly known as: ARICEPT   10 mg, Oral, Nightly      finasteride 5 MG tablet  Commonly known as: PROSCAR   5 mg, Oral, Every Night at Bedtime      furosemide 20 MG tablet  Commonly known as: LASIX   20 mg, Oral, Daily      Iron 240 (27 Fe) MG tablet   1 tablet, Daily      melatonin 5 MG tablet tablet   5 mg, Oral,  Nightly PRN      memantine 10 MG tablet  Commonly known as: NAMENDA   10 mg, Oral, 2 Times Daily      metFORMIN 1000 MG tablet  Commonly known as: GLUCOPHAGE   500 mg, 2 Times Daily With Meals      methenamine 1 g tablet  Commonly known as: HIPREX   1 g, Oral, 2 Times Daily With Meals      metoprolol tartrate 50 MG tablet  Commonly known as: LOPRESSOR   50 mg, Oral, Every 12 Hours Scheduled      multivitamin with minerals tablet tablet   1 tablet, Nightly      omeprazole 20 MG capsule  Commonly known as: priLOSEC   40 mg, Every Morning      ondansetron 4 MG tablet  Commonly known as: ZOFRAN   4 mg, Every 6 Hours PRN      pravastatin 40 MG tablet  Commonly known as: PRAVACHOL   40 mg, Oral, Nightly      PROBIOTIC ADVANCED PO   1 tablet, 2 Times Daily      sertraline 50 MG tablet  Commonly known as: ZOLOFT   50 mg, Oral, Daily      tiotropium bromide-olodaterol 2.5-2.5 MCG/ACT aerosol solution inhaler  Commonly known as: STIOLTO RESPIMAT   2 puffs, Inhalation, Daily, 2 inh once a day             Stop These Medications      aspirin 81 MG EC tablet              Allergies   Allergen Reactions    Sglt2 Inhibitors Other (See Comments)     Recurrent UTI    Xarelto [Rivaroxaban] GI Bleeding     GI bleed    Penicillins Hives     Has tolerated cefepime, ceftriaxone, cefazolin, cefdinir, cefuroxime, cephalexin         Discharge Disposition:  Home or Self Care    Diet:  Hospital:  Diet Order   Procedures    Diet: Cardiac, Diabetic; Healthy Heart (2-3 Na+); Consistent Carbohydrate; Fluid Consistency: Thin (IDDSI 0)            Activity:  As tolerated    Restrictions or Other Recommendations:  None       CODE STATUS:    Code Status and Medical Interventions: No CPR (Do Not Attempt to Resuscitate); Limited Support; No intubation (DNI)   Ordered at: 03/03/25 1248     Medical Intervention Limits:    No intubation (DNI)     Level Of Support Discussed With:    Patient     Code Status (Patient has no pulse and is not breathing):    No  CPR (Do Not Attempt to Resuscitate)     Medical Interventions (Patient has pulse or is breathing):    Limited Support       Future Appointments   Date Time Provider Department Center   3/10/2025  8:45 AM [facility] 2  [facility] None   3/11/2025  2:00 PM Rafa Lin, [facility] None   3/12/2025  1:15 PM Pito Way MD [facility]   3/21/2025 10:30 AM Evangelina Hines APRN [facility]   4/1/2025  3:15 PM Charmaine Marte APRN [facility]   4/3/2025  2:15 PM Anthony Mcdaniel MD [facility]   5/13/2025  3:00 PM Blanquita Ansari APRN [facility]   6/17/2025  1:15 PM Pito Way MD [facility]                 Jazmín Tee MD  03/05/25      Time Spent on Discharge:  I spent  25  minutes on this discharge activity which included: face-to-face encounter with the patient, reviewing the data in the system, coordination of the care with the nursing staff as well as consultants, documentation, and entering orders.

## 2025-03-06 ENCOUNTER — TRANSITIONAL CARE MANAGEMENT TELEPHONE ENCOUNTER (OUTPATIENT)
Dept: CALL CENTER | Facility: HOSPITAL | Age: 89
End: 2025-03-06
Payer: MEDICARE

## 2025-03-06 NOTE — OUTREACH NOTE
Call Center TCM Note      Flowsheet Row Responses   [facility] patient discharged from? [place]   Does the patient have one of the following disease processes/diagnoses(primary or secondary)? Other   TCM attempt successful? Yes   Call start time 1021   Call end time 1025   Discharge diagnosis Acute posterior epistaxis,  Coil Embolization   Is patient permission given to speak with other caregiver? Yes   Person spoke with today (if not patient) and relationship daughterNikki (POA)   Meds reviewed with patient/caregiver? Yes  [start: augmentin  Stop: aspirin]   Does the patient have all medications ordered at discharge? Yes   Is the patient taking all medications as directed (includes completed medication regime)? Yes   Comments PCP Dr Way. Hospital follow up appt in place for 3/12  115pm with PCP   Does the patient have an appointment with their PCP within 7-14 days of discharge? Yes   Has home health visited the patient within 72 hours of discharge? N/A   Psychosocial issues? No   Did the patient receive a copy of their discharge instructions? Yes   Nursing interventions Reviewed instructions with patient  [daughter]   What is the patient's perception of their health status since discharge? Improving   Is the patient/caregiver able to teach back signs and symptoms related to disease process for when to call PCP? Yes   Is the patient/caregiver able to teach back signs and symptoms related to disease process for when to call 911? Yes   Is the patient/caregiver able to teach back the hierarchy of who to call/visit for symptoms/problems? PCP, Specialist, Home health nurse, Urgent Care, ED, 911 Yes   If the patient is a current smoker, are they able to teach back resources for cessation? Not a smoker   TCM call completed? Yes   Call end time 1025   Would this patient benefit from a Referral to Amb Social Work? No   Is the patient interested in additional calls from an ambulatory ? No             Shiela Olivo RN    3/6/2025, 10:31 EST

## 2025-03-10 ENCOUNTER — HOSPITAL ENCOUNTER (OUTPATIENT)
Facility: HOSPITAL | Age: 89
Discharge: HOME | End: 2025-03-10
Admitting: NURSE PRACTITIONER
Payer: MEDICARE

## 2025-03-10 DIAGNOSIS — R91.1 NODULE OF LOWER LOBE OF LEFT LUNG: ICD-10-CM

## 2025-03-10 PROCEDURE — 71250 CT THORAX DX C-: CPT

## 2025-03-10 RX ORDER — NITROFURANTOIN 25; 75 MG/1; MG/1
100 CAPSULE ORAL 2 TIMES DAILY
Qty: 20 CAPSULE | Refills: 0 | Status: SHIPPED | OUTPATIENT
Start: 2025-03-10 | End: 2025-03-20

## 2025-03-11 ENCOUNTER — HOSPITAL ENCOUNTER (OUTPATIENT)
Dept: RADIATION ONCOLOGY | Facility: HOSPITAL | Age: 89
Setting detail: RADIATION/ONCOLOGY SERIES
Discharge: HOME | End: 2025-03-11
Payer: MEDICARE

## 2025-03-11 ENCOUNTER — OFFICE VISIT (OUTPATIENT)
Dept: RADIATION ONCOLOGY | Facility: HOSPITAL | Age: 89
End: 2025-03-11
Payer: MEDICARE

## 2025-03-11 ENCOUNTER — OUTSIDE FACILITY SERVICE (OUTPATIENT)
Dept: INTERNAL MEDICINE | Facility: CLINIC | Age: 89
End: 2025-03-11
Payer: MEDICARE

## 2025-03-11 VITALS
RESPIRATION RATE: 16 BRPM | DIASTOLIC BLOOD PRESSURE: 74 MMHG | BODY MASS INDEX: 27.22 KG/M2 | OXYGEN SATURATION: 96 % | WEIGHT: 217.8 LBS | TEMPERATURE: 96.8 F | SYSTOLIC BLOOD PRESSURE: 130 MMHG | HEART RATE: 93 BPM

## 2025-03-11 DIAGNOSIS — C34.32 MALIGNANT NEOPLASM OF LOWER LOBE OF LEFT LUNG: ICD-10-CM

## 2025-03-11 DIAGNOSIS — R91.1 NODULE OF LOWER LOBE OF LEFT LUNG: Primary | ICD-10-CM

## 2025-03-11 PROCEDURE — G0463 HOSPITAL OUTPT CLINIC VISIT: HCPCS

## 2025-03-11 NOTE — PROGRESS NOTES
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 male with multiple medical comorbidities including COPD, CHF, Afib status post watchman device, hypertension, CKD stage III, and remote tobacco abuse who was incidentally found to have a 2.2 cm left lower lobe pulmonary nodule on CT scan of the chest for work-up of acute hypoxic respiratory failure secondary to Influenza A infection.  The patient was sent for outpatient PET scan, showing the subsolid left lower lobe nodule to be mildly hypermetabolic and concerning for primary malignancy.  There was otherwise no evidence of hypermetabolic hilar/mediastinal lymphadenopathy or distant metastatic disease.  The patient was uninterested in risks associated with biopsy.  The patient was not considered a candidate for surgery.  The patient underwent definitive empiric treatment with a course of SBRT consisting of 50 Gy in 5 fractions delievered on the [employer] Radixact.  He completed treatments 2023 and was noted on post-treatment imaging to have had a good response.      Recent surveillance CT scan of the chest performed 2024 demonstrated a moderate left pleural effusion with left basilar atelectasis which obscures complete visualization of the region of the previously treated left lower lobe lung nodule.  For this reason, a short-interval CT scan of the chest was recommended.  The patient returns today having undergone repeat CT scan of the chest on 3/10/2025.      Since we last saw the patient, he was  hospitalized for massive epistaxis requiring embolization.  He has since had no further issues with nose bleed and is planning on talking to cardiology for recommendations about resuming aspirin which has been held.  He remains independent with ADLs and uses a rollator.  He notes stable exertional dyspnea but otherwise denies dyspnea at rest, productive cough, hemoptysis, chest/rib pain, fever, chills.  He describes stable dysphagia predating his prior radiotherapy course.  He takes Lasix 20 mg daily and wears compression stockings, noting no significant peripheral edema.  He is about to start antibiotics for a UTI.     He and his daughter wish to discuss CT scan findings.      MEDICATIONS: Medication reconciliation for the patient was reviewed and confirmed in the electronic medical record.    Review of Systems   Constitutional:  Positive for fatigue.   HENT:   Positive for nosebleeds (none since 3/5/2025 s/p embolization) and trouble swallowing.    Respiratory:  Positive for shortness of breath (with exertion).    Genitourinary:  Positive for difficulty urinating (hx chronic urinary retention - has indwelling catheter to legbag).         Current +UTI and starts oral abx today   Musculoskeletal:  Positive for gait problem (rollator walker).   Neurological:  Positive for gait problem (rollator walker).   All other systems reviewed and are negative.          KPS 70%      Physical Exam  Vitals and nursing note reviewed.   Constitutional:       General: He is not in acute distress.     Appearance: Normal appearance. He is well-developed.      Comments: Pleasant elderly gentleman sitting upright in wheelchair no apparent distress.   HENT:      Head: Normocephalic and atraumatic.   Eyes:      Conjunctiva/sclera: Conjunctivae normal.      Pupils: Pupils are equal, round, and reactive to light.   Cardiovascular:      Rate and Rhythm: Normal rate and regular rhythm.      Heart sounds: No murmur heard.     No friction rub.    Pulmonary:      Effort: Pulmonary effort is normal. No respiratory distress.      Breath sounds: Normal breath sounds. No wheezing.   Genitourinary:     Comments: Indwelling f/c connected to legbag  Musculoskeletal:         General: Normal range of motion.      Cervical back: Normal range of motion and neck supple.   Lymphadenopathy:      Cervical: No cervical adenopathy.   Skin:     General: Skin is warm and dry.   Neurological:      Mental Status: He is alert and oriented to person, place, and time.   Psychiatric:         Behavior: Behavior normal.         Thought Content: Thought content normal.         Judgment: Judgment normal.         VITAL SIGNS:   Vitals:    03/11/25 1406   BP: 130/74   Pulse: 93   Resp: 16   Temp: 96.8 °F (36 °C)   TempSrc: Temporal   SpO2: 96%   Weight: 98.8 kg (217 lb 12.8 oz)   PainSc: 0-No pain           IMAGING:  I have personally the following imaging study alongside Dr. Leary:  CT Chest wo Diagnostic 3/10/2025  Official radiology read is pending.        The following portions of the patient's history were reviewed and updated as appropriate: allergies, current medications, past family history, past medical history, past social history, past surgical history and problem list.         Diagnoses and all orders for this visit:    1. Malignant neoplasm of lower lobe of left lung  -     NM Pet Skull Base To Mid Thigh; Future       IMPRESSION:  Darien Camarena is an 89 y.o. gentleman with history of a suspected early stage primary bronchogenic carcinoma of the left lower lobe of lung.  The patient was uninterested in biopsy.  The patient underwent definitive, empiric SBRT to this lesion, completing 2/24/2023.  The patient had a good response with evidence of fibrosis around the lesion on subsequent imaging.  Most recent CT scan of the chest performed 3/11/2025 continues to demonstrate findings within the left lung base which appear consistent with postradiation consolidative  changes/atelectasis and scarring, though complete visualization of the region of the previously treated left lower lobe lung nodule remains obscured.  There are no clearly new pulmonary parenchymal nodules or mediastinal/hilar lymphadenopathy identified.  I will obtain a PET/CT scan and if no concern at that time for recurrent/progressive disease, then we will likely revert back to q6 month cycle of surveillance imaging.       RECOMMENDATIONS:    Left lower lobe primary bronchogenic carcinoma  -cT1b N0 M0   -Radiographic diagnosis  -Hypermetabolic on PET scan  -No evidence of regional or distant metastatic disease  -Patient uninterested in biopsy  -Status post definitive empiric SBRT on the Radixact, 50 Gy in 5 fractions              -completed 2/24/2023  -Post treatment CT chest shows no evidence of disease progression with interval treatment response  -CT chest 12/9/2024 demonstrates moderate left pleural effusion and left basilar atelectasis obscuring visualization of previously irradiated region with repeat 3 month CT scan recommended.  Otherwise, no additional evidence suggestive of new/progressive disease  -Repeat noncontrast CT scan of chest 3/10/2025 appears stable but the region of interest is difficult to visualize due to what appears to be likely scarring/consolidation   -reviewed alongside Dr. Leary  -official radiology read is pending  -Recommendations for PET/CT scan for further characterization and r/o recurrent disease  -RTC at that time        CHF   -Discussed relationship between fluid volume overload and progressive lung symptoms such as dyspnea, cough, etc.   -Discussed CHF as common cause of pleural effusions in the lungs   -Continues Lasix 20 mg daily  -Continues follow up with cardiology      CKD Stage III  -Stable  -Recommend judicious use of contrast with scans      Epistaxis  -Hospitalized 3/3-3/5/2025  -Status post rhino rocket and embolization with ENT  -H&H stabilized following  intervention  -No further bleeding reported  -ASA held    -will follow up with cardiology and ENT recs to resume  -Follows with ENT       Return in about 2 weeks (around 3/25/2025) for Office Visit, Imaging - See orders.    Rafa Lin, APRN      I spent a total of 40 minutes on today's visit, with more than 15 minutes in direct face to face communication, and the remainder of the time spent in reviewing the relevant history, records, available imaging, and for documentation.

## 2025-03-12 ENCOUNTER — OFFICE VISIT (OUTPATIENT)
Dept: INTERNAL MEDICINE | Facility: CLINIC | Age: 89
End: 2025-03-12
Payer: MEDICARE

## 2025-03-12 VITALS
RESPIRATION RATE: 21 BRPM | HEART RATE: 80 BPM | WEIGHT: 217 LBS | DIASTOLIC BLOOD PRESSURE: 70 MMHG | BODY MASS INDEX: 27.12 KG/M2 | SYSTOLIC BLOOD PRESSURE: 128 MMHG

## 2025-03-12 DIAGNOSIS — N30.00 ACUTE CYSTITIS WITHOUT HEMATURIA: ICD-10-CM

## 2025-03-12 DIAGNOSIS — R04.0 EPISTAXIS: Primary | ICD-10-CM

## 2025-03-12 DIAGNOSIS — D50.9 IRON DEFICIENCY ANEMIA, UNSPECIFIED IRON DEFICIENCY ANEMIA TYPE: ICD-10-CM

## 2025-03-12 LAB
BASOPHILS # BLD AUTO: 0.09 10*3/MM3 (ref 0–0.2)
BASOPHILS NFR BLD AUTO: 1.2 % (ref 0–1.5)
BILIRUB BLD-MCNC: NEGATIVE MG/DL
CLARITY, POC: CLEAR
COLOR UR: YELLOW
DEPRECATED RDW RBC AUTO: 55.7 FL (ref 37–54)
EOSINOPHIL # BLD AUTO: 0.28 10*3/MM3 (ref 0–0.4)
EOSINOPHIL NFR BLD AUTO: 3.9 % (ref 0.3–6.2)
ERYTHROCYTE [DISTWIDTH] IN BLOOD BY AUTOMATED COUNT: 15.7 % (ref 12.3–15.4)
EXPIRATION DATE: NORMAL
GLUCOSE UR STRIP-MCNC: NEGATIVE MG/DL
HCT VFR BLD AUTO: 34.2 % (ref 37.5–51)
HGB BLD-MCNC: 10.5 G/DL (ref 13–17.7)
IMM GRANULOCYTES # BLD AUTO: 0.03 10*3/MM3 (ref 0–0.05)
IMM GRANULOCYTES NFR BLD AUTO: 0.4 % (ref 0–0.5)
KETONES UR QL: NEGATIVE
LEUKOCYTE EST, POC: NEGATIVE
LYMPHOCYTES # BLD AUTO: 1.23 10*3/MM3 (ref 0.7–3.1)
LYMPHOCYTES NFR BLD AUTO: 16.9 % (ref 19.6–45.3)
Lab: NORMAL
MCH RBC QN AUTO: 29.6 PG (ref 26.6–33)
MCHC RBC AUTO-ENTMCNC: 30.7 G/DL (ref 31.5–35.7)
MCV RBC AUTO: 96.3 FL (ref 79–97)
MONOCYTES # BLD AUTO: 0.59 10*3/MM3 (ref 0.1–0.9)
MONOCYTES NFR BLD AUTO: 8.1 % (ref 5–12)
NEUTROPHILS NFR BLD AUTO: 5.04 10*3/MM3 (ref 1.7–7)
NEUTROPHILS NFR BLD AUTO: 69.5 % (ref 42.7–76)
NITRITE UR-MCNC: NEGATIVE MG/ML
NRBC BLD AUTO-RTO: 0 /100 WBC (ref 0–0.2)
PH UR: 6.5 [PH] (ref 5–8)
PLATELET # BLD AUTO: 266 10*3/MM3 (ref 140–450)
PMV BLD AUTO: 9.8 FL (ref 6–12)
PROT UR STRIP-MCNC: NEGATIVE MG/DL
RBC # BLD AUTO: 3.55 10*6/MM3 (ref 4.14–5.8)
RBC # UR STRIP: NEGATIVE /UL
SP GR UR: 1.01 (ref 1–1.03)
UROBILINOGEN UR QL: NORMAL
WBC NRBC COR # BLD AUTO: 7.26 10*3/MM3 (ref 3.4–10.8)

## 2025-03-12 PROCEDURE — 82728 ASSAY OF FERRITIN: CPT | Performed by: INTERNAL MEDICINE

## 2025-03-12 PROCEDURE — 84466 ASSAY OF TRANSFERRIN: CPT | Performed by: INTERNAL MEDICINE

## 2025-03-12 PROCEDURE — 85025 COMPLETE CBC W/AUTO DIFF WBC: CPT | Performed by: INTERNAL MEDICINE

## 2025-03-12 PROCEDURE — 83540 ASSAY OF IRON: CPT | Performed by: INTERNAL MEDICINE

## 2025-03-13 ENCOUNTER — TELEPHONE (OUTPATIENT)
Dept: INTERNAL MEDICINE | Facility: CLINIC | Age: 89
End: 2025-03-13
Payer: MEDICARE

## 2025-03-13 LAB
FERRITIN SERPL-MCNC: 47.5 NG/ML (ref 30–400)
IRON 24H UR-MRATE: 166 MCG/DL (ref 59–158)
IRON SATN MFR SERPL: 50 % (ref 20–50)
TIBC SERPL-MCNC: 329 MCG/DL (ref 298–536)
TRANSFERRIN SERPL-MCNC: 221 MG/DL (ref 200–360)

## 2025-03-13 NOTE — TELEPHONE ENCOUNTER
Caller: Nikki Ware    Relationship: Emergency Contact    Best call back number: [phone]    What is the best time to reach you: ANYTIME     Who are you requesting to speak with (clinical staff, provider,  specific staff member): NURSE       What was the call regarding: THE PATIENTS DAUGHTER STATES THAT MICORBID IS MAKING THE PATIENT VERY DIZZY SHE WOULD LIKE TO KNOW WHAT TO DO AND IF THE DOCTOR WANTS TO CHANGE THE MEDICATION

## 2025-03-13 NOTE — PROGRESS NOTES
Chief Complaint   Patient presents with    Transitional Care Management       History of Present Illness      The patient presents to the office for a follow-up visit from a recent hospitalization. The patient was hospitalized from 03-Mar-2025 through 05-Mar-2025 with anemia and epistaxis. The records have been received and reviewed. The medication list has been reconciled. The hospital stay was uncomplicated. His in hospital treatment consisted of embolization.  Consultations were obtained from ENT.       Since leaving the hospital he reports that he is back to normal. He denies abdominal pain, itchy watery eyes, bone pain, chills, congestion, a dry cough, a wet cough, decreased appetite, diarrhea, dysuria, ear drainage, ear pain, eye drainage, facial pain, fever, a headache, joint pain, myalgias, nasal discharge, nausea, neck stiffness, night sweats, a rash, sneezing, a sore throat, vomiting or wheezing. He also reports that he does not have chest pain, dyspnea, edema, syncope, blurred vision or palpitations.    The patient presents for a follow-up related to anemia. There are no reports of blood loss. The patient has no symptoms of fever, sweats, weight loss, fatigue or paresthesias. The patient's energy level is normal.    The patient presents for follow-up of nose bleeds which are from the left nostril which have been occuring for ten days. The nosebleeds have resolved. There has not been drainage from the nose in addition to the bleeding. He denies a history of nasal trauma.    Medications      Current Outpatient Medications:     albuterol sulfate  (90 Base) MCG/ACT inhaler, Inhale 2 puffs Every 4 (Four) Hours As Needed for Wheezing., Disp: 54 g, Rfl: 1    allopurinol (ZYLOPRIM) 300 MG tablet, Take 1 tablet by mouth Daily., Disp: 90 tablet, Rfl: 1    ascorbic acid (VITAMIN C) 1000 MG tablet, Take 1 tablet by mouth Daily. (Patient taking differently: Take 1 tablet by mouth 2 (Two) Times a Day.), Disp: ,  Rfl:     Coenzyme Q10 300 MG capsule, Take 1 capsule by mouth Every Night., Disp: , Rfl:     digoxin (LANOXIN) 125 MCG tablet, Take 1 tablet by mouth Daily., Disp: 30 tablet, Rfl: 11    docusate sodium (COLACE) 100 MG capsule, Take 2 capsules by mouth Daily. (Patient taking differently: Take 2 capsules by mouth Daily As Needed for Constipation.), Disp: , Rfl:     donepezil (ARICEPT) 10 MG tablet, Take 1 tablet by mouth Every Night., Disp: 90 tablet, Rfl: 1    Ferrous Gluconate (IRON) 240 (27 FE) MG tablet, Take 1 tablet by mouth Daily., Disp: , Rfl:     finasteride (PROSCAR) 5 MG tablet, Take 1 tablet by mouth every night at bedtime., Disp: 90 tablet, Rfl: 1    furosemide (LASIX) 20 MG tablet, Take 1 tablet by mouth Daily., Disp: 90 tablet, Rfl: 1    melatonin 5 MG tablet tablet, Take 1 tablet by mouth At Night As Needed (insomnia / sleep)., Disp: , Rfl:     memantine (NAMENDA) 10 MG tablet, Take 1 tablet by mouth 2 (Two) Times a Day., Disp: 180 tablet, Rfl: 1    metFORMIN (GLUCOPHAGE) 1000 MG tablet, Take 0.5 tablets by mouth 2 (Two) Times a Day With Meals., Disp: , Rfl:     methenamine (HIPREX) 1 g tablet, Take 1 tablet by mouth 2 (Two) Times a Day With Meals., Disp: 180 tablet, Rfl: 1    metoprolol tartrate (LOPRESSOR) 50 MG tablet, Take 1 tablet by mouth Every 12 (Twelve) Hours., Disp: 120 tablet, Rfl: 5    multivitamin with minerals (MULTIVITAMIN MEN 50+ PO), Take 1 tablet by mouth Every Night. Centrum Sliver, Disp: , Rfl:     nitrofurantoin, macrocrystal-monohydrate, (Macrobid) 100 MG capsule, Take 1 capsule by mouth 2 (Two) Times a Day for 10 days., Disp: 20 capsule, Rfl: 0    omeprazole (priLOSEC) 20 MG capsule, Take 2 capsules by mouth Every Morning., Disp: , Rfl:     ondansetron (ZOFRAN) 4 MG tablet, Take 1 tablet by mouth Every 6 (Six) Hours As Needed for Nausea or Vomiting., Disp: , Rfl:     pravastatin (PRAVACHOL) 40 MG tablet, Take 1 tablet by mouth Every Night., Disp: , Rfl:     Probiotic Product  (PROBIOTIC ADVANCED PO), Take 1 tablet by mouth 2 (Two) Times a Day., Disp: , Rfl:     sertraline (ZOLOFT) 50 MG tablet, Take 1 tablet by mouth Daily., Disp: 90 tablet, Rfl: 1    tiotropium bromide-olodaterol (STIOLTO RESPIMAT) 2.5-2.5 MCG/ACT aerosol solution inhaler, Inhale 2 puffs Daily. 2 inh once a day, Disp: 3 each, Rfl: 3   Current outpatient and discharge medications have been reconciled for the patient.  Reviewed by: Pito Way MD    Allergies    Allergies   Allergen Reactions    Sglt2 Inhibitors Other (See Comments)     Recurrent UTI    Xarelto [Rivaroxaban] GI Bleeding     GI bleed    Penicillins Hives     Has tolerated cefepime, ceftriaxone, cefazolin, cefdinir, cefuroxime, cephalexin       Problem List    Patient Active Problem List   Diagnosis    Type 2 diabetes mellitus with stage 3 chronic kidney disease, without long-term current use of insulin    Gastroesophageal reflux disease with esophagitis    Hyperlipidemia LDL goal <100    Essential hypertension    Nephrolithiasis    Obstructive sleep apnea syndrome    Permanent atrial fibrillation    Stage 3 chronic kidney disease    Coronary artery disease involving native coronary artery of native heart without angina pectoris    Nodule of lower lobe of left lung    H/O: GI bleed    Presence of Watchman left atrial appendage closure device    Heart failure with mildly reduced ejection fraction (HFmrEF)    Obesity    Severe malnutrition    Acute on chronic urinary retention    Hydroureteronephrosis    Urethral stricture    Acute posterior epistaxis    Moderate protein-calorie malnutrition       Medications, Allergies, Problems List and Past History were reviewed and updated.    Physical Examination    /70   Pulse 80   Resp 21   Wt 98.4 kg (217 lb)   BMI 27.12 kg/m²       HEENT: Head- Normocephalic Atraumatic. Facies- Within normal limits. Pinnas- Normal texture and shape bilaterally. Canals- Normal bilaterally. TMs- Normal bilaterally.  Nares- Patent bilaterally. Nasal Septum- is normal. There is no tenderness to palpation over the frontal or maxillary sinuses. Lids- Normal bilaterally. Conjunctiva- Clear bilaterally. Sclera- Anicteric bilaterally. Oropharynx- Moist with no lesions. Tonsils- No enlargement, erythema or exudate.    Neck: Thyroid- non enlarged, symmetric and has no nodules. No bruits are detected. ROM- Normal Range of Motion with no rigidity.    Lungs: Auscultation- Clear to auscultation bilaterally. There are no retractions, clubbing or cyanosis. The Expiratory to Inspiratory ratio is equal.    Lymph Nodes: Cervical- no enlarged lymph nodes noted.    Cardiovascular: There are no carotid bruits. Heart- Normal Rate with Regular rhythm and no murmurs. There are no gallops. There are no rubs. In the lower extremities there is no edema. The upper extremities do not have edema.    Abdomen: Soft, benign, non-tender with no masses, hernias, organomegaly or scars.    Impression and Assessment    Anemia.    Epistaxis.    Plan    Epistaxis Plan: A cool mist humidifier was encouraged. Medication will be added as noted below.    Anemia Plan: Further plans will be made after results of testing are available.    Diagnoses and all orders for this visit:    1. Epistaxis (Primary)  -     CBC & Differential; Future  -     Iron Profile; Future  -     Ferritin; Future  -     CBC & Differential  -     Iron Profile  -     Ferritin    2. Iron deficiency anemia, unspecified iron deficiency anemia type  -     CBC & Differential; Future  -     Iron Profile; Future  -     Ferritin; Future  -     CBC & Differential  -     Iron Profile  -     Ferritin    3. Acute cystitis without hematuria  -     Urine Culture - Urine, Urine, Clean Catch; Future  -     POC Urinalysis Dipstick, Automated; Future  -     POC Urinalysis Dipstick, Automated  -     Urine Culture - Urine, Urine, Clean Catch      Transitional Care Management Certification  I certify that the following  are true:  Communication was made within 2 business days of discharge.  Complexity of Medical Decision Making is moderate.  Face to face visit occurred within 8 days.    *Note: 14947 is for high complexity patients with a face to face visit within 7 days of discharge.  [id number] is for high complexity patients with a face to face on days 8-14 post discharge or medium complexity with face to face visit within 14 days post discharge.        Return to Office    The patient was instructed to return for follow-up at the next scheduled visit. The patient was instructed to return sooner if the condition changes, worsens, or does not resolve.

## 2025-03-14 NOTE — TELEPHONE ENCOUNTER
Spoke with daughter.  Will continue to monitor and increase fluid intake.  Awaiting recent culture result.  Take macrobid with food.  Pito Way MD  11:17 EDT  03/14/25

## 2025-03-27 ENCOUNTER — HOSPITAL ENCOUNTER (OUTPATIENT)
Facility: HOSPITAL | Age: 89
Discharge: HOME | End: 2025-03-27
Payer: MEDICARE

## 2025-03-27 ENCOUNTER — HOSPITAL ENCOUNTER (OUTPATIENT)
Facility: HOSPITAL | Age: 89
Setting detail: RADIATION/ONCOLOGY SERIES
End: 2025-03-27
Payer: MEDICARE

## 2025-03-27 DIAGNOSIS — C34.32 MALIGNANT NEOPLASM OF LOWER LOBE OF LEFT LUNG: ICD-10-CM

## 2025-03-27 LAB — GLUCOSE BLDC GLUCOMTR-MCNC: 88 MG/DL (ref 70–130)

## 2025-03-27 PROCEDURE — 78815 PET IMAGE W/CT SKULL-THIGH: CPT

## 2025-03-27 PROCEDURE — A9552 F18 FDG: HCPCS | Performed by: NURSE PRACTITIONER

## 2025-03-27 PROCEDURE — 82948 REAGENT STRIP/BLOOD GLUCOSE: CPT

## 2025-03-27 PROCEDURE — 34310000005 FLUDEOXYGLUCOSE F18 SOLUTION: Performed by: NURSE PRACTITIONER

## 2025-03-27 RX ADMIN — FLUDEOXYGLUCOSE F18 1 DOSE: 300 INJECTION INTRAVENOUS at 08:11

## 2025-03-31 ENCOUNTER — OFFICE VISIT (OUTPATIENT)
Age: 89
End: 2025-03-31
Payer: MEDICARE

## 2025-03-31 VITALS
DIASTOLIC BLOOD PRESSURE: 83 MMHG | HEIGHT: 75 IN | WEIGHT: 219 LBS | OXYGEN SATURATION: 92 % | SYSTOLIC BLOOD PRESSURE: 137 MMHG | BODY MASS INDEX: 27.23 KG/M2 | HEART RATE: 84 BPM | RESPIRATION RATE: 14 BRPM | TEMPERATURE: 97.5 F

## 2025-03-31 DIAGNOSIS — C34.32 MALIGNANT NEOPLASM OF LOWER LOBE OF LEFT LUNG: Primary | ICD-10-CM

## 2025-03-31 PROCEDURE — G0463 HOSPITAL OUTPT CLINIC VISIT: HCPCS

## 2025-03-31 NOTE — PROGRESS NOTES
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 male with multiple medical comorbidities including COPD, CHF, Afib status post watchman device, hypertension, CKD stage III, and remote tobacco abuse who was incidentally found to have a 2.2 cm left lower lobe pulmonary nodule on CT scan of the chest for work-up of acute hypoxic respiratory failure secondary to Influenza A infection.  The patient was sent for outpatient PET scan, showing the subsolid left lower lobe nodule to be mildly hypermetabolic and concerning for primary malignancy.  There was otherwise no evidence of hypermetabolic hilar/mediastinal lymphadenopathy or distant metastatic disease.  The patient was uninterested in risks associated with biopsy.  The patient was not considered a candidate for surgery.  The patient underwent definitive empiric treatment with a course of SBRT consisting of 50 Gy in 5 fractions delievered on the [facility] Radixact.  He completed treatments 2023 and was noted on post-treatment imaging to have had a good response.      Recent surveillance CT scan of the chest performed 2024 demonstrated a moderate left pleural effusion with left basilar atelectasis which obscures complete visualization of the region of the previously treated left lower lobe lung nodule.  For this reason, a short-interval CT scan of the chest was recommended.  The patient returns today having undergone repeat CT scan of the chest on 3/10/2025.      The patient was hospitalized for massive  epistaxis requiring embolization earlier this year.  The patient had a PET scan and is here to discuss the results.    He reports that his right eye is a little bit irritated.  He thinks he may have sprayed some of his nasal spray into his eye potentially resulting in bacterial infection.  He says things are getting much better from that standpoint.    He denies any new changes to his health.        Current Outpatient Medications:     albuterol sulfate  (90 Base) MCG/ACT inhaler, Inhale 2 puffs Every 4 (Four) Hours As Needed for Wheezing., Disp: 54 g, Rfl: 1    allopurinol (ZYLOPRIM) 300 MG tablet, Take 1 tablet by mouth Daily., Disp: 90 tablet, Rfl: 1    ascorbic acid (VITAMIN C) 1000 MG tablet, Take 1 tablet by mouth Daily. (Patient taking differently: Take 1 tablet by mouth 2 (Two) Times a Day.), Disp: , Rfl:     Coenzyme Q10 300 MG capsule, Take 1 capsule by mouth Every Night., Disp: , Rfl:     digoxin (LANOXIN) 125 MCG tablet, Take 1 tablet by mouth Daily., Disp: 30 tablet, Rfl: 11    donepezil (ARICEPT) 10 MG tablet, Take 1 tablet by mouth Every Night., Disp: 90 tablet, Rfl: 1    Ferrous Gluconate (IRON) 240 (27 FE) MG tablet, Take 1 tablet by mouth Daily., Disp: , Rfl:     finasteride (PROSCAR) 5 MG tablet, Take 1 tablet by mouth every night at bedtime., Disp: 90 tablet, Rfl: 1    furosemide (LASIX) 20 MG tablet, Take 1 tablet by mouth Daily., Disp: 90 tablet, Rfl: 1    melatonin 5 MG tablet tablet, Take 1 tablet by mouth At Night As Needed (insomnia / sleep)., Disp: , Rfl:     memantine (NAMENDA) 10 MG tablet, Take 1 tablet by mouth 2 (Two) Times a Day., Disp: 180 tablet, Rfl: 1    metFORMIN (GLUCOPHAGE) 1000 MG tablet, Take 0.5 tablets by mouth 2 (Two) Times a Day With Meals., Disp: , Rfl:     methenamine (HIPREX) 1 g tablet, Take 1 tablet by mouth 2 (Two) Times a Day With Meals., Disp: 180 tablet, Rfl: 1    metoprolol tartrate (LOPRESSOR) 50 MG tablet, Take 1 tablet by mouth Every 12 (Twelve)  Hours., Disp: 120 tablet, Rfl: 5    multivitamin with minerals (MULTIVITAMIN MEN 50+ PO), Take 1 tablet by mouth Every Night. Centrum Gloryiver, Disp: , Rfl:     omeprazole (priLOSEC) 20 MG capsule, Take 2 capsules by mouth Every Morning., Disp: , Rfl:     ondansetron (ZOFRAN) 4 MG tablet, Take 1 tablet by mouth Every 6 (Six) Hours As Needed for Nausea or Vomiting., Disp: , Rfl:     pravastatin (PRAVACHOL) 40 MG tablet, Take 1 tablet by mouth Every Night., Disp: , Rfl:     Probiotic Product (PROBIOTIC ADVANCED PO), Take 1 tablet by mouth 2 (Two) Times a Day., Disp: , Rfl:     sertraline (ZOLOFT) 50 MG tablet, Take 1 tablet by mouth Daily., Disp: 90 tablet, Rfl: 1    tiotropium bromide-olodaterol (STIOLTO RESPIMAT) 2.5-2.5 MCG/ACT aerosol solution inhaler, Inhale 2 puffs Daily. 2 inh once a day, Disp: 3 each, Rfl: 3    docusate sodium (COLACE) 100 MG capsule, Take 2 capsules by mouth Daily. (Patient not taking: Reported on 3/31/2025), Disp: , Rfl:       Review of Systems   Constitutional:  Negative for appetite change, chills, fatigue, fever and unexpected weight change.   HENT:  Negative.     Eyes:         Right eye red and watery; swollen     Respiratory:  Negative for cough and shortness of breath.    Cardiovascular:  Negative for chest pain and leg swelling.   Gastrointestinal:  Positive for constipation. Negative for diarrhea, nausea and vomiting.   Endocrine: Negative.    Genitourinary: Negative.          Current +UTI and starts oral abx today   Musculoskeletal:  Positive for gait problem (rollator walker). Negative for arthralgias and myalgias.   Skin: Negative.    Neurological:  Positive for extremity weakness and gait problem (rollator walker).   Hematological: Negative.    Psychiatric/Behavioral:  Negative for depression and sleep disturbance. The patient is not nervous/anxious.    All other systems reviewed and are negative.      Karnofsky score: 80         Physical Exam  Vitals and nursing note reviewed.  "  Constitutional:       General: He is not in acute distress.     Appearance: Normal appearance. He is well-developed.      Comments: Pleasant elderly gentleman sitting upright in wheelchair no apparent distress.   HENT:      Head: Normocephalic and atraumatic.   Eyes:      Conjunctiva/sclera: Conjunctivae normal.      Pupils: Pupils are equal, round, and reactive to light.      Comments: Right eye red appears somewhat irritated with crust along the eyelashes.   Cardiovascular:      Rate and Rhythm: Normal rate and regular rhythm.      Heart sounds: No murmur heard.     No friction rub.   Pulmonary:      Effort: Pulmonary effort is normal. No respiratory distress.      Breath sounds: Normal breath sounds. No wheezing.   Genitourinary:     Comments: Indwelling f/c connected to legbag  Musculoskeletal:         General: Normal range of motion.      Cervical back: Normal range of motion and neck supple.   Lymphadenopathy:      Cervical: No cervical adenopathy.   Skin:     General: Skin is warm and dry.   Neurological:      Mental Status: He is alert and oriented to person, place, and time.   Psychiatric:         Behavior: Behavior normal.         Thought Content: Thought content normal.         Judgment: Judgment normal.         VITAL SIGNS:   Vitals:    03/31/25 1320   BP: 137/83   Pulse: 84   Resp: 14   Temp: 97.5 °F (36.4 °C)   TempSrc: Oral   SpO2: 92%   Weight: 99.3 kg (219 lb)   Height: 190.5 cm (75\")   PainSc: 0-No pain           IMAGING:    I reviewed the patient's PET scan today.  The patient does appear to have recurrent disease in his left lower lobe of the lung adjacent to the previously treated lesion directly.  The patient also has a low bit of uptake in a right anterior fifth rib near the costochondral junction.  The patient reports that he did have a fall but denies bumping that area.  It does appear that there is a clear fracture line in the area and the uptake does appear to be less than in his " recurrent primary.  No other distant disease notable.  But we will wait on the final report.    CT Chest Without Contrast Diagnostic  Result Date: 3/13/2025  Impression: 1. Worsening medial left lower lung consolidation with findings concerning for interval increase in size of a left lower lung lesion with postobstructive consolidative changes and pleural effusion. Electronically Signed: Deanne Riley MD  3/13/2025 11:41 AM EDT  Workstation ID: [id number]      CT Chest With Contrast Diagnostic  Result Date: 12/9/2024  Impression: 1.Moderate left pleural effusion with left basilar atelectasis obscures the region of the previously identified left lower lobe nodule. Recommend follow-up CT chest in 3 months. 2.Small right pleural effusion with right basilar atelectasis. Electronically Signed: Renny Good MD  12/9/2024 2:51 PM EST  Workstation ID: [id number]    3    CT Abdomen Pelvis Without Contrast  Result Date: 12/6/2024  Impression: 1.No acute abnormality identified within the abdomen or pelvis. 2.Indwelling Del Cid catheter and air noted within the urinary bladder and left urinary tract. Please correlate with urinalysis to exclude urinary tract infection. 3.Several punctate nonobstructing calculi within the left kidney. 4.Small bilateral pleural effusions with bibasilar atelectasis. 5.Additional findings as detailed above. Electronically Signed: Andrea Lopez MD  12/6/2024 12:23 PM EST  Workstation ID: [id number]      Karnofsky score: 80     The following portions of the patient's history were reviewed and updated as appropriate: allergies, current medications, past family history, past medical history, past social history, past surgical history and problem list.         Diagnoses and all orders for this visit:    1. Malignant neoplasm of lower lobe of left lung  -     NM Pet Skull Base To Mid Thigh; Future       IMPRESSION:  Darien Camarena is an 89 y.o. gentleman with history of a suspected early stage primary  bronchogenic carcinoma of the left lower lobe of lung.  The patient was uninterested in biopsy.  The patient underwent definitive, empiric SBRT to this lesion, completing 2/24/2023.  The patient had a good response with evidence of fibrosis around the lesion on subsequent imaging.  The patient has CT scan 3/11/2025 that showed post radiation consolidative changes in the left lower lobe but there was some increased nodularity.  The patient's PET scan does appear to show disease in recurrent to an area directly adjacent to what was treated previously.    We discussed options at this time point.  The patient is a poor candidate for bronchoscopy and biopsy given his comorbidities and the probability of a positive diagnostic bronchoscopy for this seems very unlikely given the consolidation in the area.  We discussed options for treatments of this lesion empirically as it likely represents recurrent disease.    I would recommend dose of 2054 Martin in 3 fractions versus 50 Martin in 5 fractions.    We discussed what he could anticipate in terms of side effects which would likely be minimal.  Recommend proceeding with empiric radiotherapy to this recurrent lesion.    The patient does have an area in his right fifth anterior rib that does look slightly hypermetabolic.  The patient has had fall and does appear to have a clear fracture line.  The avidity of this lesion is lower than the primary.  Will have to follow this lesion closely, but it does appear to me to likely represent healing fracture rather than metastatic disease.    We also discussed with the patient have some adjacent pleural effusion.  This was not hypermetabolic on the PET scan and    Recommend patient return for CT simulation and treatments.    Greater than 1 hour was spent preparing for and coordinating this visit. >50% of the time was spent in direct face to face conversation with the patient teaching, answering question, and providing explanations regarding  the patient's case.  The decision to treat the patient with radiation is a complex one and carries the risk of long-term side effects and complications.  The patient's malignancy represents a complicated life threatening condition that requires complex multidisciplinary management for treatment and followup.        RECOMMENDATIONS:      Recurrent left lower lobe primary bronchogenic carcinoma  -cT1c N0M0  -Directly adjacent to previously treated lesion and overlaps with area of atelectasis  -Hot on PET scan  -Adjacent effusion not hypermetabolic likely reactive or related to CHF   -Biopsy likely offers low diagnostic yield and high probability complications  -No evidence of regional disease  -Recommend empiric treatment given patient's comorbidities  -Recommend 54 Martin in 3 fractions versus 50 Martin in 5 fractions  -Recommend 4D CT scan for planning  -Recommend PET fusion   -Awaiting final read on PET scan.  -Recommend return for CT simulation    Right anterior fifth rib lesion  -Appears to be healing fracture  -Evidence of a fracture line on the scans  -Avidity less than primary  -Does have a history of a fall although he reports is mostly his left side that was impacted  -Watch closely in the future.  -Await final read on PET scan    Left lower lobe primary bronchogenic carcinoma  -cT1b N0 M0   -Radiographic diagnosis  -Hypermetabolic on PET scan  -No evidence of regional or distant metastatic disease  -Patient uninterested in biopsy  -Status post definitive empiric SBRT on the Radixact, 50 Gy in 5 fractions              -completed 2/24/2023    CHF   -Discussed relationship between fluid volume overload and progressive lung symptoms such as dyspnea, cough, etc.   -Discussed CHF as common cause of pleural effusions in the lungs   -Continues Lasix 20 mg daily  -Continues follow up with cardiology      CKD Stage III  -Stable  -Recommend judicious use of contrast with scans      Epistaxis  -Hospitalized  3/3-3/5/2025  -Status post rhino rocket and embolization with ENT  -H&H stabilized following intervention  -No further bleeding reported  -ASA held    -will follow up with cardiology and ENT recs to resume  -Follows with ENT       No follow-ups on file.    Abbe Leary MD

## 2025-04-01 ENCOUNTER — OFFICE VISIT (OUTPATIENT)
Dept: UROLOGY | Facility: CLINIC | Age: 89
End: 2025-04-01
Payer: MEDICARE

## 2025-04-01 VITALS — DIASTOLIC BLOOD PRESSURE: 90 MMHG | OXYGEN SATURATION: 91 % | HEART RATE: 82 BPM | SYSTOLIC BLOOD PRESSURE: 148 MMHG

## 2025-04-01 DIAGNOSIS — N13.30 HYDRONEPHROSIS OF LEFT KIDNEY: ICD-10-CM

## 2025-04-01 DIAGNOSIS — R33.9 URINARY RETENTION: Primary | ICD-10-CM

## 2025-04-01 DIAGNOSIS — N39.0 RECURRENT UTI: ICD-10-CM

## 2025-04-01 PROCEDURE — 99213 OFFICE O/P EST LOW 20 MIN: CPT | Performed by: NURSE PRACTITIONER

## 2025-04-01 PROCEDURE — 1160F RVW MEDS BY RX/DR IN RCRD: CPT | Performed by: NURSE PRACTITIONER

## 2025-04-01 PROCEDURE — 1159F MED LIST DOCD IN RCRD: CPT | Performed by: NURSE PRACTITIONER

## 2025-04-01 NOTE — PROGRESS NOTES
Follow Up Office Visit      Patient Name: Darien Camarena  : 1935   MRN: [id number]     Chief Complaint:    Chief Complaint   Patient presents with    Urinary Retention       Referring Provider: No ref. provider found    History of Present Illness: Darien Camarena is a 89 y.o. male who presents today for follow up of recurrent UTI, chronic left hydronephrosis, left renal atrophy, chronically distended/dysfunctional neurogenic bladder.  He was previously referred to [facility] from our office in  for sepsis.  At that time an indwelling Del Cid catheter was placed and he had resolution of his left hydronephrosis.  He followed-up with Dr. Larkin 24 and it was decided to keep indwelling Del Cid catheter in place indefinitely.  He has been undergoing Del Cid catheter exchanges with home health and denies any issues with Del Cid catheter exchanges.  Patient's daughter is with him and helps to provide some of the history.  He is feeling well today and two way Del Cid catheter is draining yellow/clear urine output.    Creatinine 25; 0.89.     Of note; he is planning to start radiation for lung nodule.      Subjective      Review of System: Review of Systems   Genitourinary: Negative.         Chronic indwelling Del Cid catheter      I have reviewed the ROS documented by my clinical staff, I have updated appropriately and I agree. BERYL Dukes    I have reviewed and the following portions of the patient's history were updated as appropriate: past family history, past medical history, past social history, past surgical history and problem list.    Medications:     Current Outpatient Medications:     albuterol sulfate  (90 Base) MCG/ACT inhaler, Inhale 2 puffs Every 4 (Four) Hours As Needed for Wheezing., Disp: 54 g, Rfl: 1    allopurinol (ZYLOPRIM) 300 MG tablet, Take 1 tablet by mouth Daily., Disp: 90 tablet, Rfl: 1    ascorbic acid (VITAMIN C) 1000 MG tablet,  Take 1 tablet by mouth Daily. (Patient taking differently: Take 1 tablet by mouth 2 (Two) Times a Day.), Disp: , Rfl:     Coenzyme Q10 300 MG capsule, Take 1 capsule by mouth Every Night., Disp: , Rfl:     digoxin (LANOXIN) 125 MCG tablet, Take 1 tablet by mouth Daily., Disp: 30 tablet, Rfl: 11    donepezil (ARICEPT) 10 MG tablet, Take 1 tablet by mouth Every Night., Disp: 90 tablet, Rfl: 1    Ferrous Gluconate (IRON) 240 (27 FE) MG tablet, Take 1 tablet by mouth Daily., Disp: , Rfl:     finasteride (PROSCAR) 5 MG tablet, Take 1 tablet by mouth every night at bedtime., Disp: 90 tablet, Rfl: 1    furosemide (LASIX) 20 MG tablet, Take 1 tablet by mouth Daily., Disp: 90 tablet, Rfl: 1    melatonin 5 MG tablet tablet, Take 1 tablet by mouth At Night As Needed (insomnia / sleep)., Disp: , Rfl:     memantine (NAMENDA) 10 MG tablet, Take 1 tablet by mouth 2 (Two) Times a Day., Disp: 180 tablet, Rfl: 1    metFORMIN (GLUCOPHAGE) 1000 MG tablet, Take 0.5 tablets by mouth 2 (Two) Times a Day With Meals., Disp: , Rfl:     methenamine (HIPREX) 1 g tablet, Take 1 tablet by mouth 2 (Two) Times a Day With Meals., Disp: 180 tablet, Rfl: 1    metoprolol tartrate (LOPRESSOR) 50 MG tablet, Take 1 tablet by mouth Every 12 (Twelve) Hours., Disp: 120 tablet, Rfl: 5    multivitamin with minerals (MULTIVITAMIN MEN 50+ PO), Take 1 tablet by mouth Every Night. Centrum Sliver, Disp: , Rfl:     omeprazole (priLOSEC) 20 MG capsule, Take 2 capsules by mouth Every Morning., Disp: , Rfl:     ondansetron (ZOFRAN) 4 MG tablet, Take 1 tablet by mouth Every 6 (Six) Hours As Needed for Nausea or Vomiting., Disp: , Rfl:     pravastatin (PRAVACHOL) 40 MG tablet, Take 1 tablet by mouth Every Night., Disp: , Rfl:     Probiotic Product (PROBIOTIC ADVANCED PO), Take 1 tablet by mouth 2 (Two) Times a Day., Disp: , Rfl:     sertraline (ZOLOFT) 50 MG tablet, Take 1 tablet by mouth Daily., Disp: 90 tablet, Rfl: 1    tiotropium bromide-olodaterol (STIOLTO  RESPIMAT) 2.5-2.5 MCG/ACT aerosol solution inhaler, Inhale 2 puffs Daily. 2 inh once a day, Disp: 3 each, Rfl: 3    docusate sodium (COLACE) 100 MG capsule, Take 2 capsules by mouth Daily. (Patient not taking: Reported on 4/1/2025), Disp: , Rfl:     Allergies:   Allergies   Allergen Reactions    Sglt2 Inhibitors Other (See Comments)     Recurrent UTI    Xarelto [Rivaroxaban] GI Bleeding     GI bleed    Penicillins Hives     Has tolerated cefepime, ceftriaxone, cefazolin, cefdinir, cefuroxime, cephalexin         Objective     Physical Exam:   Vital Signs:   Vitals:    04/01/25 1524   BP: 148/90   Pulse: 82   SpO2: 91%     There is no height or weight on file to calculate BMI.     Physical Exam  Vitals and nursing note reviewed.   Constitutional:       Appearance: Normal appearance.   HENT:      Head: Normocephalic and atraumatic.      Nose: Nose normal.      Mouth/Throat:      Mouth: Mucous membranes are moist.   Eyes:      Pupils: Pupils are equal, round, and reactive to light.   Pulmonary:      Effort: Pulmonary effort is normal.   Abdominal:      General: Abdomen is flat.      Palpations: Abdomen is soft.   Genitourinary:     Comments: 2 way Del Cid catheter with yellow/clear urine output to leg bag.  Musculoskeletal:         General: Normal range of motion.      Cervical back: Normal range of motion.      Comments: Wheelchair to ambulate   Skin:     General: Skin is warm and dry.      Capillary Refill: Capillary refill takes less than 2 seconds.   Neurological:      General: No focal deficit present.      Mental Status: He is alert.   Psychiatric:         Mood and Affect: Mood normal.       Labs:   Brief Urine Lab Results  (Last result in the past 365 days)        Color   Clarity   Blood   Leuk Est   Nitrite   Protein   CREAT   Urine HCG        03/12/25 1514 Yellow   Clear   Negative   Negative   Negative   Negative                   Urine Culture          9/4/2024    21:48 12/5/2024    14:32 12/5/2024    16:10  "2/17/2025    14:34   Urine Culture   Urine Culture >100,000 CFU/mL Enterobacter cloacae complex  >100,000 CFU/mL Escherichia coli  >100,000 CFU/mL Escherichia coli  >100,000 CFU/mL Mixed Kareen Isolated         Lab Results   Component Value Date    GLUCOSE 96 03/04/2025    CALCIUM 8.3 (L) 03/04/2025     03/04/2025    K 3.9 03/04/2025    CO2 25.0 03/04/2025     03/04/2025    BUN 23 03/04/2025    CREATININE 0.89 03/04/2025    EGFRIFNONA 79 12/16/2021    BCR 25.8 (H) 03/04/2025    ANIONGAP 11.0 03/04/2025       Lab Results   Component Value Date    WBC 7.26 03/12/2025    HGB 10.5 (L) 03/12/2025    HCT 34.2 (L) 03/12/2025    MCV 96.3 03/12/2025     03/12/2025       Images:   NM PET/CT Skull Base to Mid Thigh  Result Date: 4/1/2025  Impression: 1. Intensely hypermetabolic activity in an approximately 2.5 cm mass partially embedded in the upper margin of the patient's persistent left lower lobe atelectasis. 2. Probably incidental increased activity in what appears to be a healing anterior right fifth rib fracture. Please correlate with history of trauma and current patient's symptoms. 3. No evidence of potential malignancy/metastasis elsewhere. Electronically Signed: Oliverio Younger MD  4/1/2025 9:42 AM EDT  Workstation ID: [id number]    CT Chest Without Contrast Diagnostic  Result Date: 3/13/2025  Impression: 1. Worsening medial left lower lung consolidation with findings concerning for interval increase in size of a left lower lung lesion with postobstructive consolidative changes and pleural effusion. Electronically Signed: Deanne Riley MD  3/13/2025 11:41 AM EDT  Workstation ID: [id number]    Invasive peripheral vascular study  Result Date: 3/5/2025  Unremarkable carotid and cerebral angiograms.  Specifically, there is no active contrast extravasation, pseudoaneurysm, vascular lesion, or other \"culprit\" lesion to account for the patient's recurrent, refractory left-sided epistaxis.  Given the " recurrent/refractory epistaxis, the distal bilateral internal maxillary arteries were prophylactically embolized with PVA particles and Target microcoil's, as detailed above.     CT Chest With Contrast Diagnostic  Result Date: 12/9/2024  Impression: 1.Moderate left pleural effusion with left basilar atelectasis obscures the region of the previously identified left lower lobe nodule. Recommend follow-up CT chest in 3 months. 2.Small right pleural effusion with right basilar atelectasis. Electronically Signed: Renny Good MD  12/9/2024 2:51 PM EST  Workstation ID: [id number]    XR Ankle 3+ View Right  Result Date: 12/9/2024  Impression: 1.No evidence of acute fracture or malalignment. 2.Minimal lucency along the lateral talar dome may represent a small osteochondral lesion. 3.Moderate to severe degenerative changes of the midfoot. Electronically Signed: Alexei Apodaca MD  12/9/2024 10:42 AM EST  Workstation ID: [id number]    CT Abdomen Pelvis Without Contrast  Result Date: 12/6/2024  Impression: 1.No acute abnormality identified within the abdomen or pelvis. 2.Indwelling Del Cid catheter and air noted within the urinary bladder and left urinary tract. Please correlate with urinalysis to exclude urinary tract infection. 3.Several punctate nonobstructing calculi within the left kidney. 4.Small bilateral pleural effusions with bibasilar atelectasis. 5.Additional findings as detailed above. Electronically Signed: Andrea Lopez MD  12/6/2024 12:23 PM EST  Workstation ID: [id number]    XR Chest 1 View  Result Date: 12/5/2024  Impression: Improved left lower lobe infiltrate. No new abnormality. Electronically Signed: Aster Mathur MD  12/5/2024 11:36 AM EST  Workstation ID: [id number]      Measures:   Tobacco:   Darien Camarena  reports that he quit smoking about 64 years ago. His smoking use included cigarettes. He started smoking about 78 years ago. He has a 15 pack-year smoking history. He has been exposed to tobacco  smoke. He quit smokeless tobacco use about 14 years ago.  His smokeless tobacco use included chew.          Urine Incontinence: Patient reports that he is not currently experiencing any symptoms of urinary incontinence.      Assessment / Plan      Assessment/Plan:   89 y.o. male who presented today for follow up of recurrent UTI and chronic urinary retention.  He has been undergoing Del Cid catheter exchanges with home health and denies any issues.  He is feeling well in the office today.  His Del Cid catheter is draining yellow/clear urine output.  Will follow-up in 6 months for symptom check and will continue home health Del Cid catheter exchanges every 4 weeks.  Instructed patient and patient's daughter to notify our office if he develops issues with catheter draining or suspects a UTI.  They are agreeable with plan of care.    Diagnoses and all orders for this visit:    1. Urinary retention (Primary)    2. Hydronephrosis of left kidney    3. Recurrent UTI           Follow Up:   Return in about 6 months (around 10/1/2025).    I spent approximately 20 minutes providing clinical care for this patient; including review of patient's chart and provider documentation, face to face time spent with patient in examination room (obtaining history, performing physical exam, discussing diagnosis and management options), placing orders, and completing patient documentation.     BERYL Nino  [facility]

## 2025-04-03 ENCOUNTER — OFFICE VISIT (OUTPATIENT)
Dept: CARDIOLOGY | Facility: CLINIC | Age: 89
End: 2025-04-03
Payer: MEDICARE

## 2025-04-03 VITALS
WEIGHT: 221 LBS | BODY MASS INDEX: 27.48 KG/M2 | HEIGHT: 75 IN | SYSTOLIC BLOOD PRESSURE: 120 MMHG | OXYGEN SATURATION: 96 % | DIASTOLIC BLOOD PRESSURE: 72 MMHG | HEART RATE: 65 BPM

## 2025-04-03 DIAGNOSIS — I48.21 PERMANENT ATRIAL FIBRILLATION: Primary | Chronic | ICD-10-CM

## 2025-04-03 DIAGNOSIS — I10 ESSENTIAL HYPERTENSION: Chronic | ICD-10-CM

## 2025-04-03 NOTE — PROGRESS NOTES
Darien Camarena  [id number]  1935  [phone]    [facility] CARDIOLOGY     Referring Provider: No ref. provider found     Pito Way MD  [address]  [place] [id number]    Chief Complaint   Patient presents with    Permanent atrial fibrillation     Problem List  Persistent atrial fibrillation  Diagnosed August 2012.   FARHAD-guided ECV, 8/17/2012  CHADS-VASc 5 (age > 75, CAD, HTN, DM)  Successful external cardioversion for asymptomatic atrial fibrillation with RVR, 10/25/18  Successful cardioversion for atrial fibrillation RVR, 3/6/19.  Sotalol increased  Minimally symptomatic atrial fibrillation with cardioversion and the ER, 10/31/2019  ED cardioversion for A. fib with RVR, 7/21/2020  ED cardioversion for asymptomatic A. fib with RVR, 12/27/2020   ED cardioversion for asymptomatic A. fib with RVR x 2  occasions, March 2021  Transition from sotalol to amiodarone, 4/14/2021  Successful FARHAD/ECV May 3, 2021: normal LVEF, mild MR  Successful cardioversion, 8/5/2022  Echo 8/22 EF 50%, anatomically and functionally normal valves  Unsuccessful ECV 6/08/2023 - pt converted spontaneously to SR 2 min after ECV  Watchman LAAO device, 11/28/24 by Dr. Mcdaniel  FARHAD, 11/22/24: EF 36-40%, 27 mm watchman flex device in place.  Well-seated and well compressed with no device related thrombus.  Small jet of color 2.5 mm at the superior aspect adjacent to the left upper pulmonary vein.  Mild MR, mild TR  GI Bleed - on Xarelto  CAD   Nuclear MPS 09/15/2022:small-sized, mildly severe area of ischemia located in the apex.  LVEF 56%  [facility] 9/29/2022 - mild CAD  HTN  HLD  DM II  GERD  CKD Stage 3  Gout  STEVEN    History of Present Illness   Darien Camarena is a 89 y.o. male who presents to my electrophysiology clinic for follow up of AF.  Patient was hospitalized in December for CHF exacerbation.  Patient was discharged from the [facility] he had elevated heart rate.  Digoxin was started at  last clinic visit with improvement in heart rates.  Patient states he is feeling good and is living alone.  His lung cancer has returned and he is starting radiation.  He has had mechanical falls. Patient has Watchman device.      Outpatient Medications Marked as Taking for the 4/3/25 encounter (Office Visit) with Iris Jo APRN   Medication Sig Dispense Refill    albuterol sulfate  (90 Base) MCG/ACT inhaler Inhale 2 puffs Every 4 (Four) Hours As Needed for Wheezing. 54 g 1    allopurinol (ZYLOPRIM) 300 MG tablet Take 1 tablet by mouth Daily. 90 tablet 1    ascorbic acid (VITAMIN C) 1000 MG tablet Take 1 tablet by mouth Daily. (Patient taking differently: Take 1 tablet by mouth 2 (Two) Times a Day.)      Coenzyme Q10 300 MG capsule Take 1 capsule by mouth Every Night.      digoxin (LANOXIN) 125 MCG tablet Take 1 tablet by mouth Daily. 30 tablet 11    docusate sodium (COLACE) 100 MG capsule Take 2 capsules by mouth Daily.      donepezil (ARICEPT) 10 MG tablet Take 1 tablet by mouth Every Night. 90 tablet 1    Ferrous Gluconate (IRON) 240 (27 FE) MG tablet Take 1 tablet by mouth Daily.      finasteride (PROSCAR) 5 MG tablet Take 1 tablet by mouth every night at bedtime. 90 tablet 1    furosemide (LASIX) 20 MG tablet Take 1 tablet by mouth Daily. 90 tablet 1    melatonin 5 MG tablet tablet Take 1 tablet by mouth At Night As Needed (insomnia / sleep).      memantine (NAMENDA) 10 MG tablet Take 1 tablet by mouth 2 (Two) Times a Day. 180 tablet 1    metFORMIN (GLUCOPHAGE) 1000 MG tablet Take 0.5 tablets by mouth 2 (Two) Times a Day With Meals.      methenamine (HIPREX) 1 g tablet Take 1 tablet by mouth 2 (Two) Times a Day With Meals. 180 tablet 1    metoprolol tartrate (LOPRESSOR) 50 MG tablet Take 1 tablet by mouth Every 12 (Twelve) Hours. 120 tablet 5    multivitamin with minerals (MULTIVITAMIN MEN 50+ PO) Take 1 tablet by mouth Every Night. Centrum Sliver      omeprazole (priLOSEC) 20 MG capsule Take 2  "capsules by mouth Every Morning.      ondansetron (ZOFRAN) 4 MG tablet Take 1 tablet by mouth Every 6 (Six) Hours As Needed for Nausea or Vomiting.      pravastatin (PRAVACHOL) 40 MG tablet Take 1 tablet by mouth Every Night.      Probiotic Product (PROBIOTIC ADVANCED PO) Take 1 tablet by mouth 2 (Two) Times a Day.      sertraline (ZOLOFT) 50 MG tablet Take 1 tablet by mouth Daily. 90 tablet 1    tiotropium bromide-olodaterol (STIOLTO RESPIMAT) 2.5-2.5 MCG/ACT aerosol solution inhaler Inhale 2 puffs Daily. 2 inh once a day 3 each 3            Physical Exam  Vitals:    04/03/25 1409   BP: 120/72   BP Location: Left arm   Patient Position: Sitting   Cuff Size: Adult   Pulse: 65   SpO2: 96%   Weight: 100 kg (221 lb)   Height: 190.5 cm (75\")     Body mass index is 27.62 kg/m².  Constitutional:       Appearance: Healthy appearance.   Neck:      Vascular: JVD normal.   Pulmonary:      Effort: Pulmonary effort is normal.      Breath sounds: Normal breath sounds.   Cardiovascular:      Normal rate. Irregular rhythm. Normal S1. Normal S2.       Murmurs: There is no murmur.      No gallop.  No rub.   Pulses:     Intact distal pulses.   Edema:     Peripheral edema absent.   Neurological:      General: No focal deficit present.        Diagnostic Data  Procedures  EKG: Atrial fibrillation, nonspecific ST and T wave abnormality, QRS 86 ms, QT/QTc 414/453 ms    Lab Results   Component Value Date    GLUCOSE 96 03/04/2025    CALCIUM 8.3 (L) 03/04/2025     03/04/2025    K 3.9 03/04/2025    CO2 25.0 03/04/2025     03/04/2025    BUN 23 03/04/2025    CREATININE 0.89 03/04/2025    EGFRIFNONA 79 12/16/2021    BCR 25.8 (H) 03/04/2025    ANIONGAP 11.0 03/04/2025     Lab Results   Component Value Date    WBC 7.26 03/12/2025    HGB 10.5 (L) 03/12/2025    HCT 34.2 (L) 03/12/2025    MCV 96.3 03/12/2025     03/12/2025     Lab Results   Component Value Date    INR 1.38 (H) 11/06/2024    INR 1.38 (H) 09/05/2024    INR 1.34 (H) " 09/04/2024    PROTIME 17.6 (H) 11/06/2024    PROTIME 17.1 (H) 09/05/2024    PROTIME 17.2 (H) 09/04/2024     Lab Results   Component Value Date    TSH 2.960 03/03/2025       I personally viewed and interpreted the patient's EKG/Telemetry/lab data    Darien Camarena  reports that he quit smoking about 64 years ago. His smoking use included cigarettes. He started smoking about 78 years ago. He has a 15 pack-year smoking history. He has been exposed to tobacco smoke. He quit smokeless tobacco use about 14 years ago.  His smokeless tobacco use included chew. I have educated him on the risk of diseases from using tobacco products such as cancer, COPD, and heart disease.       ACP discussion was declined by the patient. Patient does not have an advance directive, declines further assistance.    Assessment and Plan  Diagnoses and all orders for this visit:    1. Permanent atrial fibrillation (Primary)  -     Digoxin Level; Future    2. Essential hypertension        Permanent AF  - groslly asymptomatic; continue rate control  - hx of GIB and hematuria (due to frequent self catheterization), now s/p Watchman procedure and off of AC  -On aspirin only  -Patient is in atrial fibrillation controlled rates patient is asymptomatic.  Continue digoxin 125 mcg daily.  Patient will get a digoxin level at his PCPs office in June.  -continue metoprolol tartrate. Consider switching to toprol XL for HF benefits. Will defer to general cardiology.      HTN  - well controlled at home    Follow-Up  Return in about 6 months (around 10/3/2025).    Thank you for allowing me to participate in the care of your patient. Please to not hesitate to contact me with additional questions or concerns.

## 2025-04-04 ENCOUNTER — HOSPITAL ENCOUNTER (OUTPATIENT)
Dept: RADIATION ONCOLOGY | Facility: HOSPITAL | Age: 89
Setting detail: RADIATION/ONCOLOGY SERIES
Discharge: HOME | End: 2025-04-04
Payer: MEDICARE

## 2025-04-04 ENCOUNTER — HOSPITAL ENCOUNTER (OUTPATIENT)
Dept: RADIATION ONCOLOGY | Facility: HOSPITAL | Age: 89
Discharge: HOME | End: 2025-04-04

## 2025-04-04 PROCEDURE — 77470 SPECIAL RADIATION TREATMENT: CPT | Performed by: RADIOLOGY

## 2025-04-04 PROCEDURE — 77332 RADIATION TREATMENT AID(S): CPT | Performed by: RADIOLOGY

## 2025-04-07 PROCEDURE — 77399 UNLISTED PX MED RADJ PHYSICS: CPT | Performed by: RADIOLOGY

## 2025-04-11 PROCEDURE — 77301 RADIOTHERAPY DOSE PLAN IMRT: CPT | Performed by: RADIOLOGY

## 2025-04-11 PROCEDURE — 77338 DESIGN MLC DEVICE FOR IMRT: CPT | Performed by: RADIOLOGY

## 2025-04-11 PROCEDURE — 77300 RADIATION THERAPY DOSE PLAN: CPT | Performed by: RADIOLOGY

## 2025-04-11 PROCEDURE — 77293 RESPIRATOR MOTION MGMT SIMUL: CPT | Performed by: RADIOLOGY

## 2025-04-14 DIAGNOSIS — E78.5 HYPERLIPIDEMIA LDL GOAL <100: Chronic | ICD-10-CM

## 2025-04-14 RX ORDER — PRAVASTATIN SODIUM 40 MG
40 TABLET ORAL NIGHTLY
Qty: 90 TABLET | Refills: 1 | Status: SHIPPED | OUTPATIENT
Start: 2025-04-14

## 2025-04-24 ENCOUNTER — HOSPITAL ENCOUNTER (OUTPATIENT)
Dept: RADIATION ONCOLOGY | Facility: HOSPITAL | Age: 89
Discharge: HOME | End: 2025-04-24

## 2025-04-24 PROCEDURE — 77373 STRTCTC BDY RAD THER TX DLVR: CPT | Performed by: RADIOLOGY

## 2025-04-28 ENCOUNTER — HOSPITAL ENCOUNTER (OUTPATIENT)
Dept: RADIATION ONCOLOGY | Facility: HOSPITAL | Age: 89
Discharge: HOME | End: 2025-04-28
Payer: MEDICARE

## 2025-04-28 VITALS — WEIGHT: 221.1 LBS | BODY MASS INDEX: 27.64 KG/M2

## 2025-04-29 ENCOUNTER — TELEPHONE (OUTPATIENT)
Dept: RADIATION ONCOLOGY | Facility: HOSPITAL | Age: 89
End: 2025-04-29
Payer: MEDICARE

## 2025-04-29 RX ORDER — LEVOFLOXACIN 750 MG/1
750 TABLET, FILM COATED ORAL DAILY
Qty: 7 TABLET | Refills: 0 | Status: SHIPPED | OUTPATIENT
Start: 2025-04-29

## 2025-05-01 ENCOUNTER — HOSPITAL ENCOUNTER (OUTPATIENT)
Dept: RADIATION ONCOLOGY | Facility: HOSPITAL | Age: 89
Setting detail: RADIATION/ONCOLOGY SERIES
End: 2025-05-01
Payer: MEDICARE

## 2025-05-01 ENCOUNTER — OFFICE VISIT (OUTPATIENT)
Dept: INTERNAL MEDICINE | Facility: CLINIC | Age: 89
End: 2025-05-01
Payer: MEDICARE

## 2025-05-01 VITALS
SYSTOLIC BLOOD PRESSURE: 136 MMHG | RESPIRATION RATE: 16 BRPM | HEIGHT: 75 IN | HEART RATE: 80 BPM | BODY MASS INDEX: 26.86 KG/M2 | WEIGHT: 216 LBS | TEMPERATURE: 98.7 F | DIASTOLIC BLOOD PRESSURE: 82 MMHG

## 2025-05-01 DIAGNOSIS — I10 ESSENTIAL HYPERTENSION: Primary | ICD-10-CM

## 2025-05-01 PROCEDURE — 80048 BASIC METABOLIC PNL TOTAL CA: CPT | Performed by: INTERNAL MEDICINE

## 2025-05-02 ENCOUNTER — TELEPHONE (OUTPATIENT)
Dept: INTERNAL MEDICINE | Facility: CLINIC | Age: 89
End: 2025-05-02
Payer: MEDICARE

## 2025-05-02 LAB
ANION GAP SERPL CALCULATED.3IONS-SCNC: 11.9 MMOL/L (ref 5–15)
BUN SERPL-MCNC: 22 MG/DL (ref 8–23)
BUN/CREAT SERPL: 19.8 (ref 7–25)
CALCIUM SPEC-SCNC: 9.5 MG/DL (ref 8.6–10.5)
CHLORIDE SERPL-SCNC: 100 MMOL/L (ref 98–107)
CO2 SERPL-SCNC: 30.1 MMOL/L (ref 22–29)
CREAT SERPL-MCNC: 1.11 MG/DL (ref 0.76–1.27)
EGFRCR SERPLBLD CKD-EPI 2021: 63.9 ML/MIN/1.73
GLUCOSE SERPL-MCNC: 108 MG/DL (ref 65–99)
POTASSIUM SERPL-SCNC: 3.8 MMOL/L (ref 3.5–5.2)
SODIUM SERPL-SCNC: 142 MMOL/L (ref 136–145)

## 2025-05-02 NOTE — TELEPHONE ENCOUNTER
Left message for patient to return call. Office number given     Routine pp Office appt in 6 weeks. Regular diet and activity

## 2025-05-02 NOTE — TELEPHONE ENCOUNTER
YUE WITH [facility] HAS CALLED REQUESTING VERBAL ORDERS FOR HOME HEALTH SKILLED NURSING ONCE EVERY FOUR WEEKS FOR CATHETER CHANGE.    CALL BACK NUMBER [phone]

## 2025-05-05 NOTE — TELEPHONE ENCOUNTER
"Okay for verbal orders for HH per Dr Way.    Left message voicemail for Lizet with  [facility] that we were returning her call and to please call back.  Ofc. # given.     RELAY:  Dr Way said \"Okay for verbal orders for HH.\"     Document if notified.   "

## 2025-05-06 ENCOUNTER — HOSPITAL ENCOUNTER (OUTPATIENT)
Dept: RADIATION ONCOLOGY | Facility: HOSPITAL | Age: 89
Discharge: HOME | End: 2025-05-06

## 2025-05-06 PROCEDURE — 77373 STRTCTC BDY RAD THER TX DLVR: CPT | Performed by: RADIOLOGY

## 2025-05-06 RX ORDER — LEVOFLOXACIN 750 MG/1
750 TABLET, FILM COATED ORAL DAILY
Qty: 4 TABLET | Refills: 0 | Status: SHIPPED | OUTPATIENT
Start: 2025-05-06

## 2025-05-06 NOTE — TELEPHONE ENCOUNTER
Spoke with Lizet with [facility], gave okay for verbal orders for HH>  Verbalized good understanding and agreement.

## 2025-05-07 ENCOUNTER — OUTSIDE FACILITY SERVICE (OUTPATIENT)
Dept: INTERNAL MEDICINE | Facility: CLINIC | Age: 89
End: 2025-05-07
Payer: MEDICARE

## 2025-05-07 PROCEDURE — G0179 MD RECERTIFICATION HHA PT: HCPCS | Performed by: INTERNAL MEDICINE

## 2025-05-08 ENCOUNTER — HOSPITAL ENCOUNTER (OUTPATIENT)
Dept: RADIATION ONCOLOGY | Facility: HOSPITAL | Age: 89
Discharge: HOME | End: 2025-05-08

## 2025-05-08 PROCEDURE — 77373 STRTCTC BDY RAD THER TX DLVR: CPT | Performed by: RADIOLOGY

## 2025-05-09 DIAGNOSIS — C34.32 MALIGNANT NEOPLASM OF LOWER LOBE OF LEFT LUNG: Primary | ICD-10-CM

## 2025-05-12 NOTE — PROGRESS NOTES
"    Cardiology Outpatient Visit      Identification: Darien Camarena is a 89 y.o. male who resides in [place], [place]    Reason for visit:  Coronary artery disease involving native coronary artery of      Subjective      Patient is a 89 y/o gentleman who returns today for f/u of CAD, permanent atrial fibrillation and cardiac risk factors.  Patient had watchman device placed last year.  He is now on aspirin monotherapy.  In March he was having issues with nosebleeds and underwent a embolization with Dr. Bipin zhang.  He temporarily was off his aspirin but is now restarted it.  He denies any exertional chest pain but does get short of breath with activity.  He had a CT scan and there was concern of recurrence of his lung cancer in the left lower lobe.  He he was supposed to undergo 3 radiation treatments but his first 1 was initially canceled because according to the daughter there was \"fluid in the lungs\".  They increased his Lasix temporarily and put him on antibiotics.  He has since completed his radiation treatments.  He has an upcoming appointment next month with the radiation oncologist.  The daughter is wondering how they can keep the fluid away.  His blood pressure is elevated today at 136/90.  He is in rate controlled atrial fibrillation.    Review of Systems   Constitutional: Negative for malaise/fatigue.   Eyes:  Negative for vision loss in left eye and vision loss in right eye.   Cardiovascular:  Positive for dyspnea on exertion. Negative for chest pain, near-syncope, orthopnea, palpitations, paroxysmal nocturnal dyspnea and syncope.   Musculoskeletal:  Negative for myalgias.   Neurological:  Negative for brief paralysis, excessive daytime sleepiness, focal weakness, numbness, paresthesias and weakness.   All other systems reviewed and are negative.      Allergies   Allergen Reactions    Sglt2 Inhibitors Other (See Comments)     Recurrent UTI    Xarelto [Rivaroxaban] GI Bleeding     GI bleed    " "Penicillins Hives     Has tolerated cefepime, ceftriaxone, cefazolin, cefdinir, cefuroxime, cephalexin         Current Outpatient Medications   Medication Instructions    albuterol sulfate  (90 Base) MCG/ACT inhaler 2 puffs, Inhalation, Every 4 Hours PRN    allopurinol (ZYLOPRIM) 300 mg, Oral, Daily    ascorbic acid (VITAMIN C) 1,000 mg, Oral, Daily    aspirin 81 mg, Oral, Daily    Coenzyme Q10 300 MG capsule 1 capsule, Nightly    digoxin (LANOXIN) 125 mcg, Oral, Daily    docusate sodium (COLACE) 200 mg, Oral, Daily    donepezil (ARICEPT) 10 mg, Oral, Nightly    finasteride (PROSCAR) 5 mg, Oral, Every Night at Bedtime    furosemide (LASIX) 40 mg, Oral, Daily    melatonin 5 mg, Oral, Nightly PRN    memantine (NAMENDA) 10 mg, Oral, 2 Times Daily    metFORMIN (GLUCOPHAGE) 500 mg, Oral, 2 Times Daily With Meals    methenamine (HIPREX) 1 g, Oral, 2 Times Daily With Meals    metoprolol tartrate (LOPRESSOR) 50 mg, Oral, Every 12 Hours Scheduled    omeprazole (PRILOSEC) 40 mg, Every Morning    ondansetron (ZOFRAN) 4 mg, As Needed    pravastatin (PRAVACHOL) 40 mg, Oral, Nightly    Probiotic Product (PROBIOTIC ADVANCED PO) 1 tablet, 2 Times Daily    sertraline (ZOLOFT) 50 mg, Oral, Daily    tiotropium bromide-olodaterol (STIOLTO RESPIMAT) 2.5-2.5 MCG/ACT aerosol solution inhaler 2 puffs, Inhalation, Daily, 2 inh once a day         Objective     /90 (BP Location: Left arm, Patient Position: Sitting, Cuff Size: Adult)   Pulse 62   Ht 190.5 cm (75\")   Wt 98.9 kg (218 lb)   SpO2 95%   BMI 27.25 kg/m²       Constitutional:       Appearance: Healthy appearance. Well-developed.   Eyes:      General: Lids are normal. No scleral icterus.     Conjunctiva/sclera: Conjunctivae normal.   HENT:      Head: Normocephalic and atraumatic.   Neck:      Thyroid: No thyromegaly.      Vascular: No carotid bruit or JVD.   Pulmonary:      Effort: Pulmonary effort is normal.      Breath sounds: Normal breath sounds. No wheezing. No " rhonchi. No rales.   Cardiovascular:      Normal rate. Regular rhythm.      Murmurs: There is no murmur.      No gallop.  No rub.   Pulses:     Intact distal pulses.   Edema:     Peripheral edema absent.   Abdominal:      General: There is no distension.      Palpations: Abdomen is soft. There is no abdominal mass.   Musculoskeletal:      Cervical back: Normal range of motion. Skin:     General: Skin is warm and dry.      Findings: No rash.   Neurological:      General: No focal deficit present.      Mental Status: Alert and oriented to person, place, and time.      Gait: Gait is intact.   Psychiatric:         Attention and Perception: Attention normal.         Mood and Affect: Mood normal.         Behavior: Behavior normal.         Result Review  (reviewed with patient):            Lab Results   Component Value Date    GLUCOSE 108 (H) 05/01/2025    BUN 22 05/01/2025    CREATININE 1.11 05/01/2025     05/01/2025    K 3.8 05/01/2025     05/01/2025    CALCIUM 9.5 05/01/2025    PROTEINTOT 6.9 03/03/2025    ALBUMIN 3.0 (L) 03/03/2025    ALT 13 03/03/2025    AST 27 03/03/2025    ALKPHOS 160 (H) 03/03/2025    BILITOT 0.8 03/03/2025    GLOB 3.9 03/03/2025    AGRATIO 0.8 03/03/2025    BCR 19.8 05/01/2025    ANIONGAP 11.9 05/01/2025    EGFR 63.9 05/01/2025     Lab Results   Component Value Date    WBC 7.26 03/12/2025    HGB 10.5 (L) 03/12/2025    HCT 34.2 (L) 03/12/2025    MCV 96.3 03/12/2025     03/12/2025     Lab Results   Component Value Date    CHOL 108 02/17/2025    TRIG 134 02/17/2025    HDL 32 (L) 02/17/2025    LDL 52 02/17/2025     Lab Results   Component Value Date    HGBA1C 5.80 (H) 02/17/2025           Assessment     Diagnoses and all orders for this visit:    1. Coronary artery disease involving native coronary artery of native heart without angina pectoris (Primary)  Overview:  Cardiac catheterization (9/29/2022): Mild CAD.  Normal LV filling pressure      2. Heart failure with mildly reduced  ejection fraction (HFmrEF)  Overview:  Cardiac catheterization (2022): Mild CAD. Normal LV filling pressure  Echo (8/8/2022): EF 50%.  Anatomically and functionally normal valves  FARHAD (1/12/2024): LVEF 40%.  27 mm Watchman device well-seated.  No thrombus or periprosthetic flow.  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  Dispense: 90 tablet; Refill: 1  -     furosemide (LASIX) 40 MG tablet; Take 1 tablet by mouth Daily.  Dispense: 90 tablet; Refill: 1  -     Basic Metabolic Panel; Future    3. Permanent atrial fibrillation  Overview:  Diagnosed 2012.   FARHAD-guided ECV, 8/17/2012  CHADS-VASc 5 (age > 75, CAD, HTN, DM)  Multiple cardioversions, 2018, 2019, 2020, 2021  Unsuccessful cardioversion with conversion to rate control strategy, 2023  Echo (8/8/2022): EF 50%, anatomically and functionally normal valves  Watchman implant by Anthony Mcdaniel, 11/28/2023  FARHAD (1/12/2024): LVEF 40%.  27 mm Watchman device well-seated.  No thrombus or periprosthetic flow.   Limited TTE (1/27/2024): LVEF 48%  FARHAD (11/21/2024): 27 mm watchman device well seated. LVEF=36-40%    Assessment & Plan:  Rate controlled and asymptomatic  Defer NOAC due to presence of Watchman device  Continue metoprolol tartrate 50 mg twice daily  Continue digoxin 125 mcg daily      4. Essential hypertension  Overview:  Target blood pressure <130/80 mmHg    Assessment & Plan:  Continue metoprolol tartrate 50 mg twice daily    Orders:  -     Discontinue: furosemide (LASIX) 20 MG tablet; Take 1 tablet by mouth Daily.  Dispense: 90 tablet; Refill: 1  -     furosemide (LASIX) 40 MG tablet; Take 1 tablet by mouth Daily.  Dispense: 90 tablet; Refill: 1    5. Hyperlipidemia LDL goal <100  Overview:  Moderate intensity statin  therapy reasonable due to diabetic status  LDL 32 (5/2024)    Assessment & Plan:   Continue pravastatin 40 mg daily      Other orders  -     aspirin 81 MG EC tablet; Take 1 tablet by mouth Daily.          Plan   Increase Lasix 40 mg daily  BMP in 1 week  Low-sodium diet  Daily weights.  Contact us for a weight gain of 1 to 2 pounds in 24 to 48 hours      Follow-up   Return in about 6 months (around 11/13/2025), or if symptoms worsen or fail to improve, for Follow-up with Dr. Oliveros next visit.        Blanquita Ansari APRN  5/13/2025

## 2025-05-13 ENCOUNTER — OFFICE VISIT (OUTPATIENT)
Dept: CARDIOLOGY | Facility: CLINIC | Age: 89
End: 2025-05-13
Payer: MEDICARE

## 2025-05-13 VITALS
BODY MASS INDEX: 27.1 KG/M2 | SYSTOLIC BLOOD PRESSURE: 136 MMHG | HEIGHT: 75 IN | HEART RATE: 62 BPM | OXYGEN SATURATION: 95 % | WEIGHT: 218 LBS | DIASTOLIC BLOOD PRESSURE: 90 MMHG

## 2025-05-13 DIAGNOSIS — I10 ESSENTIAL HYPERTENSION: Chronic | ICD-10-CM

## 2025-05-13 DIAGNOSIS — I50.22 HEART FAILURE WITH MILDLY REDUCED EJECTION FRACTION (HFMREF): ICD-10-CM

## 2025-05-13 DIAGNOSIS — I48.21 PERMANENT ATRIAL FIBRILLATION: ICD-10-CM

## 2025-05-13 DIAGNOSIS — I25.10 CORONARY ARTERY DISEASE INVOLVING NATIVE CORONARY ARTERY OF NATIVE HEART WITHOUT ANGINA PECTORIS: Primary | ICD-10-CM

## 2025-05-13 DIAGNOSIS — E78.5 HYPERLIPIDEMIA LDL GOAL <100: Chronic | ICD-10-CM

## 2025-05-13 RX ORDER — FUROSEMIDE 40 MG/1
40 TABLET ORAL DAILY
Qty: 90 TABLET | Refills: 1 | Status: SHIPPED | OUTPATIENT
Start: 2025-05-13

## 2025-05-13 RX ORDER — ASPIRIN 81 MG/1
81 TABLET ORAL DAILY
Start: 2025-05-13

## 2025-05-13 RX ORDER — FUROSEMIDE 20 MG/1
20 TABLET ORAL DAILY
Qty: 90 TABLET | Refills: 1 | Status: SHIPPED | OUTPATIENT
Start: 2025-05-13 | End: 2025-05-13

## 2025-05-13 NOTE — ASSESSMENT & PLAN NOTE
Rate controlled and asymptomatic  Defer NOAC due to presence of Watchman device  Continue metoprolol tartrate 50 mg twice daily  Continue digoxin 125 mcg daily

## 2025-05-19 ENCOUNTER — TELEPHONE (OUTPATIENT)
Dept: RADIATION ONCOLOGY | Facility: HOSPITAL | Age: 89
End: 2025-05-19
Payer: MEDICARE

## 2025-05-26 NOTE — PROGRESS NOTES
Chief Complaint   Patient presents with    fluid on lungs      Follow up- per oncology fluid on lungs        History of Present Illness      The patient presents for a follow-up related to hypertension. The patient reports that he has dyspnea and feels that he has fluid on his lungs but he has had no headaches, chest pain, edema, syncope, blurred vision or palpitations. He states that he is taking his medication as prescribed. He is not having medication side effects.    Answers submitted by the patient for this visit:  Problem not listed (Submitted on 5/1/2025)  Chief Complaint: Other medical problem  Reason for appointment: Fluid in lungs  abdominal pain: No  anorexia: No  joint pain: No  change in stool: No  chest pain: No  chills: No  nasal congestion: No  cough: No  diaphoresis: No  fatigue: No  fever: No  headaches: No  joint swelling: No  myalgias: No  nausea: No  neck pain: No  numbness: No  rash: No  sore throat: No  swollen glands: No  dysuria: No  vertigo: No  visual change: No  vomiting: No  weakness: No  Onset: in the past 7 days  Chronicity: recurrent  Medications tried: Lasix    Medications      Current Outpatient Medications:     albuterol sulfate  (90 Base) MCG/ACT inhaler, Inhale 2 puffs Every 4 (Four) Hours As Needed for Wheezing., Disp: 54 g, Rfl: 1    allopurinol (ZYLOPRIM) 300 MG tablet, Take 1 tablet by mouth Daily., Disp: 90 tablet, Rfl: 1    ascorbic acid (VITAMIN C) 1000 MG tablet, Take 1 tablet by mouth Daily. (Patient taking differently: Take 1 tablet by mouth 2 (Two) Times a Day.), Disp: , Rfl:     Coenzyme Q10 300 MG capsule, Take 1 capsule by mouth Every Night., Disp: , Rfl:     digoxin (LANOXIN) 125 MCG tablet, Take 1 tablet by mouth Daily., Disp: 30 tablet, Rfl: 11    docusate sodium (COLACE) 100 MG capsule, Take 2 capsules by mouth Daily., Disp: , Rfl:     donepezil (ARICEPT) 10 MG tablet, Take 1 tablet by mouth Every Night., Disp: 90 tablet, Rfl: 1    finasteride (PROSCAR) 5  MG tablet, Take 1 tablet by mouth every night at bedtime., Disp: 90 tablet, Rfl: 1    melatonin 5 MG tablet tablet, Take 1 tablet by mouth At Night As Needed (insomnia / sleep)., Disp: , Rfl:     memantine (NAMENDA) 10 MG tablet, Take 1 tablet by mouth 2 (Two) Times a Day., Disp: 180 tablet, Rfl: 1    metFORMIN (GLUCOPHAGE) 1000 MG tablet, Take 0.5 tablets by mouth 2 (Two) Times a Day With Meals., Disp: 30 tablet, Rfl: 2    methenamine (HIPREX) 1 g tablet, Take 1 tablet by mouth 2 (Two) Times a Day With Meals., Disp: 180 tablet, Rfl: 1    metoprolol tartrate (LOPRESSOR) 50 MG tablet, Take 1 tablet by mouth Every 12 (Twelve) Hours., Disp: 120 tablet, Rfl: 5    omeprazole (priLOSEC) 20 MG capsule, Take 2 capsules by mouth Every Morning., Disp: , Rfl:     ondansetron (ZOFRAN) 4 MG tablet, Take 1 tablet by mouth As Needed for Nausea or Vomiting., Disp: , Rfl:     pravastatin (PRAVACHOL) 40 MG tablet, Take 1 tablet by mouth Every Night., Disp: 90 tablet, Rfl: 1    Probiotic Product (PROBIOTIC ADVANCED PO), Take 1 tablet by mouth 2 (Two) Times a Day., Disp: , Rfl:     sertraline (ZOLOFT) 50 MG tablet, Take 1 tablet by mouth Daily., Disp: 90 tablet, Rfl: 1    tiotropium bromide-olodaterol (STIOLTO RESPIMAT) 2.5-2.5 MCG/ACT aerosol solution inhaler, Inhale 2 puffs Daily. 2 inh once a day, Disp: 3 each, Rfl: 3    aspirin 81 MG EC tablet, Take 1 tablet by mouth Daily., Disp: , Rfl:     furosemide (LASIX) 40 MG tablet, Take 1 tablet by mouth Daily., Disp: 90 tablet, Rfl: 1     Allergies    Allergies   Allergen Reactions    Sglt2 Inhibitors Other (See Comments)     Recurrent UTI    Xarelto [Rivaroxaban] GI Bleeding     GI bleed    Penicillins Hives     Has tolerated cefepime, ceftriaxone, cefazolin, cefdinir, cefuroxime, cephalexin       Problem List    Patient Active Problem List   Diagnosis    Type 2 diabetes mellitus with stage 3 chronic kidney disease, without long-term current use of insulin    Gastroesophageal reflux  "disease with esophagitis    Hyperlipidemia LDL goal <100    Essential hypertension    Nephrolithiasis    Obstructive sleep apnea syndrome    Permanent atrial fibrillation    Stage 3 chronic kidney disease    Coronary artery disease involving native coronary artery of native heart without angina pectoris    Nodule of lower lobe of left lung    H/O: GI bleed    Presence of Watchman left atrial appendage closure device    Heart failure with mildly reduced ejection fraction (HFmrEF)    Obesity    Severe malnutrition    Acute on chronic urinary retention    Hydroureteronephrosis    Urethral stricture    Acute posterior epistaxis    Moderate protein-calorie malnutrition    Malignant neoplasm of lower lobe of left lung       Medications, Allergies, Problems List and Past History were reviewed and updated.    Physical Examination    /82 (BP Location: Right arm, Patient Position: Sitting, Cuff Size: Adult)   Pulse 80   Temp 98.7 °F (37.1 °C) (Infrared)   Resp 16   Ht 190.5 cm (75\")   Wt 98 kg (216 lb)   BMI 27.00 kg/m²       Neck: Thyroid- non enlarged, symmetric and has no nodules. No bruits are detected.    Lungs: Auscultation- Clear to auscultation bilaterally. There are no retractions, clubbing or cyanosis. The Expiratory to Inspiratory ratio is equal.    Cardiovascular: There are no carotid bruits. Heart- Normal Rate with Regular rhythm and no murmurs. There are no gallops. There are no rubs. In the lower extremities there is no edema. The upper extremities do not have edema.      Abdomen: Soft, benign, non-tender with no masses, hernias, organomegaly or scars.    Impression and Assessment    Essential Hypertension.    Plan    Essential Hypertension Plan: The patient was instructed to continue the current medications.    Diagnoses and all orders for this visit:    1. Essential hypertension (Primary)  -     Basic Metabolic Panel; Future  -     Basic Metabolic Panel        Return to Office    The patient was " instructed to return for follow-up in 3 months. The patient was instructed to return sooner if the condition changes, worsens, or does not resolve.

## 2025-06-02 ENCOUNTER — HOSPITAL ENCOUNTER (INPATIENT)
Facility: HOSPITAL | Age: 89
LOS: 2 days | Discharge: HOME | End: 2025-06-04
Attending: EMERGENCY MEDICINE | Admitting: STUDENT IN AN ORGANIZED HEALTH CARE EDUCATION/TRAINING PROGRAM
Payer: MEDICARE

## 2025-06-02 ENCOUNTER — TELEPHONE (OUTPATIENT)
Dept: RADIATION ONCOLOGY | Facility: HOSPITAL | Age: 89
End: 2025-06-02
Payer: MEDICARE

## 2025-06-02 ENCOUNTER — APPOINTMENT (OUTPATIENT)
Dept: GENERAL RADIOLOGY | Facility: HOSPITAL | Age: 89
End: 2025-06-02
Payer: MEDICARE

## 2025-06-02 ENCOUNTER — TELEPHONE (OUTPATIENT)
Dept: INTERNAL MEDICINE | Facility: CLINIC | Age: 89
End: 2025-06-02
Payer: MEDICARE

## 2025-06-02 DIAGNOSIS — R09.02 HYPOXEMIA: Primary | ICD-10-CM

## 2025-06-02 DIAGNOSIS — J20.9 COPD (CHRONIC OBSTRUCTIVE PULMONARY DISEASE) WITH ACUTE BRONCHITIS: ICD-10-CM

## 2025-06-02 DIAGNOSIS — J44.0 COPD (CHRONIC OBSTRUCTIVE PULMONARY DISEASE) WITH ACUTE BRONCHITIS: ICD-10-CM

## 2025-06-02 PROBLEM — B34.8 RHINOVIRUS INFECTION: Status: ACTIVE | Noted: 2025-06-02

## 2025-06-02 PROBLEM — K21.9 GERD WITHOUT ESOPHAGITIS: Status: ACTIVE | Noted: 2025-06-02

## 2025-06-02 PROBLEM — M10.9 GOUT: Status: ACTIVE | Noted: 2025-06-02

## 2025-06-02 PROBLEM — I48.21 PERMANENT ATRIAL FIBRILLATION: Status: ACTIVE | Noted: 2025-06-02

## 2025-06-02 PROBLEM — I50.22 HEART FAILURE WITH MILDLY REDUCED EJECTION FRACTION (HFMREF): Status: ACTIVE | Noted: 2025-06-02

## 2025-06-02 PROBLEM — J44.1 COPD EXACERBATION: Status: ACTIVE | Noted: 2025-06-02

## 2025-06-02 LAB
ALBUMIN SERPL-MCNC: 3.4 G/DL (ref 3.5–5.2)
ALBUMIN/GLOB SERPL: 0.8 G/DL
ALP SERPL-CCNC: 181 U/L (ref 39–117)
ALT SERPL W P-5'-P-CCNC: 15 U/L (ref 1–41)
ANION GAP SERPL CALCULATED.3IONS-SCNC: 13 MMOL/L (ref 5–15)
AST SERPL-CCNC: 27 U/L (ref 1–40)
B PARAPERT DNA SPEC QL NAA+PROBE: NOT DETECTED
B PERT DNA SPEC QL NAA+PROBE: NOT DETECTED
BASOPHILS # BLD AUTO: 0.04 10*3/MM3 (ref 0–0.2)
BASOPHILS NFR BLD AUTO: 0.6 % (ref 0–1.5)
BILIRUB SERPL-MCNC: 1.4 MG/DL (ref 0–1.2)
BUN SERPL-MCNC: 15.6 MG/DL (ref 8–23)
BUN/CREAT SERPL: 15.1 (ref 7–25)
C PNEUM DNA NPH QL NAA+NON-PROBE: NOT DETECTED
CALCIUM SPEC-SCNC: 9.2 MG/DL (ref 8.6–10.5)
CHLORIDE SERPL-SCNC: 97 MMOL/L (ref 98–107)
CO2 SERPL-SCNC: 29 MMOL/L (ref 22–29)
CREAT SERPL-MCNC: 1.03 MG/DL (ref 0.76–1.27)
D-LACTATE SERPL-SCNC: 1.9 MMOL/L (ref 0.5–2)
DEPRECATED RDW RBC AUTO: 54 FL (ref 37–54)
EGFRCR SERPLBLD CKD-EPI 2021: 69.9 ML/MIN/1.73
EOSINOPHIL # BLD AUTO: 0.18 10*3/MM3 (ref 0–0.4)
EOSINOPHIL NFR BLD AUTO: 2.7 % (ref 0.3–6.2)
ERYTHROCYTE [DISTWIDTH] IN BLOOD BY AUTOMATED COUNT: 16.4 % (ref 12.3–15.4)
FLUAV SUBTYP SPEC NAA+PROBE: NOT DETECTED
FLUBV RNA ISLT QL NAA+PROBE: NOT DETECTED
GEN 5 1HR TROPONIN T REFLEX: 39 NG/L
GLOBULIN UR ELPH-MCNC: 4.1 GM/DL
GLUCOSE BLDC GLUCOMTR-MCNC: 118 MG/DL (ref 70–130)
GLUCOSE BLDC GLUCOMTR-MCNC: 233 MG/DL (ref 70–130)
GLUCOSE SERPL-MCNC: 110 MG/DL (ref 65–99)
HADV DNA SPEC NAA+PROBE: NOT DETECTED
HBA1C MFR BLD: 5.7 % (ref 4.8–5.6)
HCOV 229E RNA SPEC QL NAA+PROBE: NOT DETECTED
HCOV HKU1 RNA SPEC QL NAA+PROBE: NOT DETECTED
HCOV NL63 RNA SPEC QL NAA+PROBE: NOT DETECTED
HCOV OC43 RNA SPEC QL NAA+PROBE: NOT DETECTED
HCT VFR BLD AUTO: 47.2 % (ref 37.5–51)
HGB BLD-MCNC: 14.9 G/DL (ref 13–17.7)
HMPV RNA NPH QL NAA+NON-PROBE: NOT DETECTED
HOLD SPECIMEN: NORMAL
HPIV1 RNA ISLT QL NAA+PROBE: NOT DETECTED
HPIV2 RNA SPEC QL NAA+PROBE: NOT DETECTED
HPIV3 RNA NPH QL NAA+PROBE: NOT DETECTED
HPIV4 P GENE NPH QL NAA+PROBE: NOT DETECTED
IMM GRANULOCYTES # BLD AUTO: 0.04 10*3/MM3 (ref 0–0.05)
IMM GRANULOCYTES NFR BLD AUTO: 0.6 % (ref 0–0.5)
LYMPHOCYTES # BLD AUTO: 0.65 10*3/MM3 (ref 0.7–3.1)
LYMPHOCYTES NFR BLD AUTO: 9.8 % (ref 19.6–45.3)
M PNEUMO IGG SER IA-ACNC: NOT DETECTED
MCH RBC QN AUTO: 28.8 PG (ref 26.6–33)
MCHC RBC AUTO-ENTMCNC: 31.6 G/DL (ref 31.5–35.7)
MCV RBC AUTO: 91.3 FL (ref 79–97)
MONOCYTES # BLD AUTO: 0.67 10*3/MM3 (ref 0.1–0.9)
MONOCYTES NFR BLD AUTO: 10.1 % (ref 5–12)
NEUTROPHILS NFR BLD AUTO: 5.07 10*3/MM3 (ref 1.7–7)
NEUTROPHILS NFR BLD AUTO: 76.2 % (ref 42.7–76)
NRBC BLD AUTO-RTO: 0 /100 WBC (ref 0–0.2)
NT-PROBNP SERPL-MCNC: 2666 PG/ML (ref 0–1800)
PLATELET # BLD AUTO: 154 10*3/MM3 (ref 140–450)
PMV BLD AUTO: 10.8 FL (ref 6–12)
POTASSIUM SERPL-SCNC: 4.1 MMOL/L (ref 3.5–5.2)
PROCALCITONIN SERPL-MCNC: 0.03 NG/ML (ref 0–0.25)
PROT SERPL-MCNC: 7.5 G/DL (ref 6–8.5)
QT INTERVAL: 378 MS
QT INTERVAL: 398 MS
QTC INTERVAL: 432 MS
QTC INTERVAL: 444 MS
RBC # BLD AUTO: 5.17 10*6/MM3 (ref 4.14–5.8)
RHINOVIRUS RNA SPEC NAA+PROBE: DETECTED
RSV RNA NPH QL NAA+NON-PROBE: NOT DETECTED
SARS-COV-2 RNA RESP QL NAA+PROBE: NOT DETECTED
SODIUM SERPL-SCNC: 139 MMOL/L (ref 136–145)
TROPONIN T % DELTA: -3
TROPONIN T NUMERIC DELTA: -1 NG/L
TROPONIN T SERPL HS-MCNC: 40 NG/L
WBC NRBC COR # BLD AUTO: 6.65 10*3/MM3 (ref 3.4–10.8)
WHOLE BLOOD HOLD COAG: NORMAL
WHOLE BLOOD HOLD SPECIMEN: NORMAL

## 2025-06-02 PROCEDURE — 25010000002 HEPARIN (PORCINE) PER 1000 UNITS: Performed by: NURSE PRACTITIONER

## 2025-06-02 PROCEDURE — 99223 1ST HOSP IP/OBS HIGH 75: CPT | Performed by: NURSE PRACTITIONER

## 2025-06-02 PROCEDURE — 85025 COMPLETE CBC W/AUTO DIFF WBC: CPT | Performed by: EMERGENCY MEDICINE

## 2025-06-02 PROCEDURE — 83880 ASSAY OF NATRIURETIC PEPTIDE: CPT | Performed by: EMERGENCY MEDICINE

## 2025-06-02 PROCEDURE — 84484 ASSAY OF TROPONIN QUANT: CPT | Performed by: EMERGENCY MEDICINE

## 2025-06-02 PROCEDURE — 0202U NFCT DS 22 TRGT SARS-COV-2: CPT | Performed by: EMERGENCY MEDICINE

## 2025-06-02 PROCEDURE — 36415 COLL VENOUS BLD VENIPUNCTURE: CPT

## 2025-06-02 PROCEDURE — 84145 PROCALCITONIN (PCT): CPT | Performed by: EMERGENCY MEDICINE

## 2025-06-02 PROCEDURE — 83605 ASSAY OF LACTIC ACID: CPT | Performed by: EMERGENCY MEDICINE

## 2025-06-02 PROCEDURE — 82948 REAGENT STRIP/BLOOD GLUCOSE: CPT

## 2025-06-02 PROCEDURE — 63710000001 INSULIN LISPRO (HUMAN) PER 5 UNITS: Performed by: NURSE PRACTITIONER

## 2025-06-02 PROCEDURE — 94799 UNLISTED PULMONARY SVC/PX: CPT

## 2025-06-02 PROCEDURE — 94640 AIRWAY INHALATION TREATMENT: CPT

## 2025-06-02 PROCEDURE — 93005 ELECTROCARDIOGRAM TRACING: CPT

## 2025-06-02 PROCEDURE — 25010000002 DEXAMETHASONE PER 1 MG: Performed by: EMERGENCY MEDICINE

## 2025-06-02 PROCEDURE — 99285 EMERGENCY DEPT VISIT HI MDM: CPT

## 2025-06-02 PROCEDURE — 93005 ELECTROCARDIOGRAM TRACING: CPT | Performed by: EMERGENCY MEDICINE

## 2025-06-02 PROCEDURE — 80053 COMPREHEN METABOLIC PANEL: CPT | Performed by: EMERGENCY MEDICINE

## 2025-06-02 PROCEDURE — 71045 X-RAY EXAM CHEST 1 VIEW: CPT

## 2025-06-02 PROCEDURE — 83036 HEMOGLOBIN GLYCOSYLATED A1C: CPT | Performed by: NURSE PRACTITIONER

## 2025-06-02 RX ORDER — DIGOXIN 125 MCG
125 TABLET ORAL DAILY
Status: DISCONTINUED | OUTPATIENT
Start: 2025-06-03 | End: 2025-06-04 | Stop reason: HOSPADM

## 2025-06-02 RX ORDER — DEXAMETHASONE SODIUM PHOSPHATE 10 MG/ML
10 INJECTION, SOLUTION INTRA-ARTICULAR; INTRALESIONAL; INTRAMUSCULAR; INTRAVENOUS; SOFT TISSUE ONCE
Status: COMPLETED | OUTPATIENT
Start: 2025-06-02 | End: 2025-06-02

## 2025-06-02 RX ORDER — METOPROLOL TARTRATE 50 MG
50 TABLET ORAL EVERY 12 HOURS SCHEDULED
Status: DISCONTINUED | OUTPATIENT
Start: 2025-06-02 | End: 2025-06-04 | Stop reason: HOSPADM

## 2025-06-02 RX ORDER — BISACODYL 10 MG
10 SUPPOSITORY, RECTAL RECTAL DAILY PRN
Status: DISCONTINUED | OUTPATIENT
Start: 2025-06-02 | End: 2025-06-04 | Stop reason: HOSPADM

## 2025-06-02 RX ORDER — POLYETHYLENE GLYCOL 3350 17 G/17G
17 POWDER, FOR SOLUTION ORAL DAILY PRN
Status: DISCONTINUED | OUTPATIENT
Start: 2025-06-02 | End: 2025-06-04 | Stop reason: HOSPADM

## 2025-06-02 RX ORDER — ASPIRIN 81 MG/1
81 TABLET ORAL DAILY
Status: DISCONTINUED | OUTPATIENT
Start: 2025-06-03 | End: 2025-06-04 | Stop reason: HOSPADM

## 2025-06-02 RX ORDER — PREDNISONE 20 MG/1
40 TABLET ORAL
Status: DISCONTINUED | OUTPATIENT
Start: 2025-06-03 | End: 2025-06-04 | Stop reason: HOSPADM

## 2025-06-02 RX ORDER — FINASTERIDE 5 MG/1
5 TABLET, FILM COATED ORAL DAILY
Status: DISCONTINUED | OUTPATIENT
Start: 2025-06-02 | End: 2025-06-04 | Stop reason: HOSPADM

## 2025-06-02 RX ORDER — IPRATROPIUM BROMIDE AND ALBUTEROL SULFATE 2.5; .5 MG/3ML; MG/3ML
3 SOLUTION RESPIRATORY (INHALATION) ONCE
Status: COMPLETED | OUTPATIENT
Start: 2025-06-02 | End: 2025-06-02

## 2025-06-02 RX ORDER — DEXTROSE MONOHYDRATE 25 G/50ML
25 INJECTION, SOLUTION INTRAVENOUS
Status: DISCONTINUED | OUTPATIENT
Start: 2025-06-02 | End: 2025-06-04 | Stop reason: HOSPADM

## 2025-06-02 RX ORDER — ACETAMINOPHEN 650 MG/1
650 SUPPOSITORY RECTAL EVERY 4 HOURS PRN
Status: DISCONTINUED | OUTPATIENT
Start: 2025-06-02 | End: 2025-06-04 | Stop reason: HOSPADM

## 2025-06-02 RX ORDER — METHENAMINE HIPPURATE 1000 MG/1
1 TABLET ORAL 2 TIMES DAILY WITH MEALS
Status: DISCONTINUED | OUTPATIENT
Start: 2025-06-02 | End: 2025-06-04 | Stop reason: HOSPADM

## 2025-06-02 RX ORDER — PRAVASTATIN SODIUM 40 MG
40 TABLET ORAL NIGHTLY
Status: DISCONTINUED | OUTPATIENT
Start: 2025-06-02 | End: 2025-06-04 | Stop reason: HOSPADM

## 2025-06-02 RX ORDER — AMOXICILLIN 250 MG
2 CAPSULE ORAL 2 TIMES DAILY PRN
Status: DISCONTINUED | OUTPATIENT
Start: 2025-06-02 | End: 2025-06-04 | Stop reason: HOSPADM

## 2025-06-02 RX ORDER — DOXYCYCLINE 100 MG/1
100 CAPSULE ORAL EVERY 12 HOURS SCHEDULED
Status: DISCONTINUED | OUTPATIENT
Start: 2025-06-02 | End: 2025-06-04 | Stop reason: HOSPADM

## 2025-06-02 RX ORDER — HEPARIN SODIUM 5000 [USP'U]/ML
5000 INJECTION, SOLUTION INTRAVENOUS; SUBCUTANEOUS EVERY 8 HOURS SCHEDULED
Status: DISCONTINUED | OUTPATIENT
Start: 2025-06-02 | End: 2025-06-04 | Stop reason: HOSPADM

## 2025-06-02 RX ORDER — DONEPEZIL HYDROCHLORIDE 10 MG/1
10 TABLET, FILM COATED ORAL NIGHTLY
Status: DISCONTINUED | OUTPATIENT
Start: 2025-06-02 | End: 2025-06-04 | Stop reason: HOSPADM

## 2025-06-02 RX ORDER — NITROGLYCERIN 0.4 MG/1
0.4 TABLET SUBLINGUAL
Status: DISCONTINUED | OUTPATIENT
Start: 2025-06-02 | End: 2025-06-04 | Stop reason: HOSPADM

## 2025-06-02 RX ORDER — PANTOPRAZOLE SODIUM 40 MG/1
40 TABLET, DELAYED RELEASE ORAL
Status: DISCONTINUED | OUTPATIENT
Start: 2025-06-03 | End: 2025-06-04 | Stop reason: HOSPADM

## 2025-06-02 RX ORDER — BUDESONIDE 0.5 MG/2ML
0.5 INHALANT ORAL
Status: DISCONTINUED | OUTPATIENT
Start: 2025-06-02 | End: 2025-06-04 | Stop reason: HOSPADM

## 2025-06-02 RX ORDER — SODIUM CHLORIDE 0.9 % (FLUSH) 0.9 %
10 SYRINGE (ML) INJECTION EVERY 12 HOURS SCHEDULED
Status: DISCONTINUED | OUTPATIENT
Start: 2025-06-02 | End: 2025-06-04 | Stop reason: HOSPADM

## 2025-06-02 RX ORDER — IBUPROFEN 600 MG/1
1 TABLET ORAL
Status: DISCONTINUED | OUTPATIENT
Start: 2025-06-02 | End: 2025-06-04 | Stop reason: HOSPADM

## 2025-06-02 RX ORDER — ACETAMINOPHEN 160 MG/5ML
650 SOLUTION ORAL EVERY 4 HOURS PRN
Status: DISCONTINUED | OUTPATIENT
Start: 2025-06-02 | End: 2025-06-04 | Stop reason: HOSPADM

## 2025-06-02 RX ORDER — IPRATROPIUM BROMIDE AND ALBUTEROL SULFATE 2.5; .5 MG/3ML; MG/3ML
3 SOLUTION RESPIRATORY (INHALATION)
Status: DISCONTINUED | OUTPATIENT
Start: 2025-06-02 | End: 2025-06-04 | Stop reason: HOSPADM

## 2025-06-02 RX ORDER — MEMANTINE HYDROCHLORIDE 10 MG/1
10 TABLET ORAL 2 TIMES DAILY
Status: DISCONTINUED | OUTPATIENT
Start: 2025-06-02 | End: 2025-06-04 | Stop reason: HOSPADM

## 2025-06-02 RX ORDER — SODIUM CHLORIDE 0.9 % (FLUSH) 0.9 %
10 SYRINGE (ML) INJECTION AS NEEDED
Status: DISCONTINUED | OUTPATIENT
Start: 2025-06-02 | End: 2025-06-04

## 2025-06-02 RX ORDER — SODIUM CHLORIDE 0.9 % (FLUSH) 0.9 %
10 SYRINGE (ML) INJECTION AS NEEDED
Status: DISCONTINUED | OUTPATIENT
Start: 2025-06-02 | End: 2025-06-04 | Stop reason: HOSPADM

## 2025-06-02 RX ORDER — SODIUM CHLORIDE 9 MG/ML
40 INJECTION, SOLUTION INTRAVENOUS AS NEEDED
Status: DISCONTINUED | OUTPATIENT
Start: 2025-06-02 | End: 2025-06-04 | Stop reason: HOSPADM

## 2025-06-02 RX ORDER — NICOTINE POLACRILEX 4 MG
15 LOZENGE BUCCAL
Status: DISCONTINUED | OUTPATIENT
Start: 2025-06-02 | End: 2025-06-04 | Stop reason: HOSPADM

## 2025-06-02 RX ORDER — GUAIFENESIN 600 MG/1
600 TABLET, EXTENDED RELEASE ORAL EVERY 12 HOURS SCHEDULED
Status: DISCONTINUED | OUTPATIENT
Start: 2025-06-02 | End: 2025-06-04 | Stop reason: HOSPADM

## 2025-06-02 RX ORDER — FUROSEMIDE 40 MG/1
40 TABLET ORAL DAILY
Status: DISCONTINUED | OUTPATIENT
Start: 2025-06-03 | End: 2025-06-04 | Stop reason: HOSPADM

## 2025-06-02 RX ORDER — ACETAMINOPHEN 325 MG/1
650 TABLET ORAL EVERY 4 HOURS PRN
Status: DISCONTINUED | OUTPATIENT
Start: 2025-06-02 | End: 2025-06-04 | Stop reason: HOSPADM

## 2025-06-02 RX ORDER — INSULIN LISPRO 100 [IU]/ML
2-7 INJECTION, SOLUTION INTRAVENOUS; SUBCUTANEOUS
Status: DISCONTINUED | OUTPATIENT
Start: 2025-06-02 | End: 2025-06-04 | Stop reason: HOSPADM

## 2025-06-02 RX ORDER — BISACODYL 5 MG/1
5 TABLET, DELAYED RELEASE ORAL DAILY PRN
Status: DISCONTINUED | OUTPATIENT
Start: 2025-06-02 | End: 2025-06-04 | Stop reason: HOSPADM

## 2025-06-02 RX ORDER — ALLOPURINOL 300 MG/1
300 TABLET ORAL DAILY
Status: DISCONTINUED | OUTPATIENT
Start: 2025-06-03 | End: 2025-06-04 | Stop reason: HOSPADM

## 2025-06-02 RX ADMIN — DONEPEZIL HYDROCHLORIDE 10 MG: 10 TABLET, FILM COATED ORAL at 22:52

## 2025-06-02 RX ADMIN — DOXYCYCLINE 100 MG: 100 CAPSULE ORAL at 22:52

## 2025-06-02 RX ADMIN — Medication 5 MG: at 22:52

## 2025-06-02 RX ADMIN — FINASTERIDE 5 MG: 5 TABLET, FILM COATED ORAL at 22:52

## 2025-06-02 RX ADMIN — INSULIN LISPRO 3 UNITS: 100 INJECTION, SOLUTION INTRAVENOUS; SUBCUTANEOUS at 22:52

## 2025-06-02 RX ADMIN — MEMANTINE 10 MG: 10 TABLET ORAL at 22:52

## 2025-06-02 RX ADMIN — GUAIFENESIN 600 MG: 600 TABLET, EXTENDED RELEASE ORAL at 22:52

## 2025-06-02 RX ADMIN — METOPROLOL TARTRATE 50 MG: 50 TABLET, FILM COATED ORAL at 22:52

## 2025-06-02 RX ADMIN — Medication 10 ML: at 22:52

## 2025-06-02 RX ADMIN — DEXAMETHASONE SODIUM PHOSPHATE 10 MG: 10 INJECTION INTRAMUSCULAR; INTRAVENOUS at 15:45

## 2025-06-02 RX ADMIN — PRAVASTATIN SODIUM 40 MG: 40 TABLET ORAL at 22:52

## 2025-06-02 RX ADMIN — IPRATROPIUM BROMIDE AND ALBUTEROL SULFATE 3 ML: 2.5; .5 SOLUTION RESPIRATORY (INHALATION) at 14:57

## 2025-06-02 RX ADMIN — IPRATROPIUM BROMIDE AND ALBUTEROL SULFATE 3 ML: 2.5; .5 SOLUTION RESPIRATORY (INHALATION) at 20:04

## 2025-06-02 RX ADMIN — HEPARIN SODIUM 5000 UNITS: 5000 INJECTION INTRAVENOUS; SUBCUTANEOUS at 22:51

## 2025-06-02 NOTE — H&P
[facility] Medicine Services  HISTORY AND PHYSICAL    Patient Name: Darien Camarena  : 1935  MRN: [id number]  Primary Care Physician: Pito Way MD  Date of admission: 2025    Subjective   Subjective     Chief Complaint:  Shortness of breath, dizziness, cough, fatigue    HPI:  Darien Camarena is a 89 y.o. male PMH Afib s/p watchman, HTN, HLD, CAD, GERD, Gout, LLL lung cancer, HFmrEF, STEVEN, T2DM, CKDIII, hx of tobacco smoking (quit at least 50 years ago) presenting with shortness of breath with exertion, dizziness, cough and fatigue. Pt states symptoms began with a dry cough cough yesterday. He states he is short of breath when walking and dizzy. Today he was suppose to go to a dermatology appointment but felt too weak to get ready to go. He denies orthopnea and states he slept well last night. Today, he reports the coughing has stopped but he still feels short of breath with exertion and very fatigued. In the ED, oxygen was found at 85-88% on RA. Pt does not use oxygen at home. He was also (+) for Rhinovirus.     Review of Systems   Gen- No fevers, chills, (+) fatigue  CV- No chest pain, palpitations, swelling of lower extremities  Resp- (+) dry cough, dyspnea with exertion  GI- No N/V/D, abd pain  MS- (-) joint pain  Neuro- (+) dizziness (-) headache.     Personal History     Past Medical History:   Diagnosis Date    Arrhythmia     Atrial fibrillation     Cataract     CHF (congestive heart failure)     Chronic kidney disease     Clotting disorder     COPD (chronic obstructive pulmonary disease)     Coronary artery disease     Dementia     Diabetes mellitus     doesnt check sugar    E. coli sepsis 2022    Enlarged prostate without lower urinary tract symptoms (luts) 2016    Full dentures     GERD (gastroesophageal reflux disease)     Gout     Hearing aid worn     bilat prn    History of colonoscopy 2012    History of radiation therapy  Subjective   Patient ID: Jj George is a 44 y.o. male is here today for follow-up for neck and left arm pain. Patient presents unaccompanied.     History of Present Illness   Patient is s/p C5/6, 6/7 ACDF in 11/2/14 due to disc herniation and myelopath with good resolution of right arm symptoms. He now presents with recurrent neck pain. It is a near constant stiffness aggravated by reading. There are times when it is severe. It is associated with bilateral arm pain L>R, throbbing, burning, shooting down into fingers on left especially the middle finger. Holding the arm up aggravates the left neck and hortensia arm pain. He does notice some swallow difficulty and coughs with solid foods. He denies unintended weight loss or lung infections.    He also reports 2 months of left anterior and posterior lateral thigh numbness/itching/tingling. It began with a severe sharp pain in the middle of the night. He denies associated weakness or incontinence. He has some urinary frequency but no dysuria, rectal pain, or weak stream.      The following portions of the patient's history were reviewed and updated as appropriate: allergies, current medications, past family history, past medical history, past social history, past surgical history and problem list.    Review of Systems   Constitutional: Negative for unexpected weight change.   HENT: Positive for trouble swallowing.    Musculoskeletal: Positive for neck pain (BUE, L>R) and neck stiffness.   Neurological: Positive for numbness (LUE). Negative for weakness.   Psychiatric/Behavioral: Positive for sleep disturbance.       Objective   Physical Exam   Constitutional: He is oriented to person, place, and time. He appears well-developed and well-nourished.   obese   Pulmonary/Chest: Effort normal.   Musculoskeletal:        Right shoulder: He exhibits normal range of motion.        Left shoulder: He exhibits pain (with internal and external rotation- upper trapezius). He exhibits  02/24/2023    SBRT LLL lung    [place] (hard of hearing)     hearing aids prn    Hyperlipidemia     Hypertension     Kidney stone     surgery x1    Lung cancer     STEVEN on CPAP     compliant with machine    PAF (paroxysmal atrial fibrillation)     Pneumonia 12/22    Primary central sleep apnea     Prostatism     Urinary incontinence     Urinary tract infection     Wears glasses     readers         Oncology Problem List:  Primary malignant neoplasm of left lower lobe of lung (03/31/2025;   Status: Active)    Oncology/Hematology History   Nodule of lower lobe of left lung   1/30/2023 Initial Diagnosis    Malignant neoplasm of lower lobe of left lung (HCC)     2/14/2023 - 2/24/2023 Radiation    Radiation OncologyTreatment Course:  Darien Camarena received 5000 cGy in 5 fractions to left lung via External Beam Radiation - EBRT.     Primary malignant neoplasm of left lower lobe of lung   3/31/2025 Initial Diagnosis    Malignant neoplasm of lower lobe of left lung     4/24/2025 - 5/8/2025 Radiation    Radiation OncologyTreatment Course:  Darien Camarena received 5100 cGy in 3 fractions to left lower lobe lung tumor via Stereotactic Radiation Therapy - SRT.         Past Surgical History:   Procedure Laterality Date    ATRIAL APPENDAGE EXCLUSION LEFT WITH TRANSESOPHAGEAL ECHOCARDIOGRAM N/A 11/28/2023    Procedure: Atrial Appendage Occlusion;  Surgeon: Anthony Mcdaniel MD;  Location:  [facility] INVAS[facility]ATION;  Service: Cardiovascular;  Laterality: N/A;    CARDIAC CATHETERIZATION Left 09/29/2022    Procedure: Left Heart Cath;  Surgeon: Titus Oliveros IV, MD;  Location:  [facility] CATH INVASIVE [facility];  Service: Cardiovascular;  Laterality: Left;    CARDIOVERSION      CATARACT EXTRACTION Bilateral     COLONOSCOPY      CYSTOSCOPY      ENDOSCOPY      possible    ENDOSCOPY N/A 9/5/2024    Procedure: ESOPHAGOGASTRODUODENOSCOPY;  Surgeon: Anthony Arambula MD;  Location:  [facility];  Service: Gastroenterology;   normal range of motion.        Cervical back: He exhibits tenderness (mild). He exhibits normal range of motion and no pain (negative axial load; positive Spurling for left upper shoulder /arm pain).        Lumbar back: He exhibits no pain (negative SLR).   Neurological: He is alert and oriented to person, place, and time. He has normal strength. He has a normal Tandem Gait Test. Gait normal.   Reflex Scores:       Tricep reflexes are 2+ on the right side and 2+ on the left side.       Bicep reflexes are 2+ on the right side and 2+ on the left side.       Brachioradialis reflexes are 2+ on the right side and 2+ on the left side.       Patellar reflexes are 2+ on the right side and 2+ on the left side.       Achilles reflexes are 2+ on the right side and 2+ on the left side.  Skin: Skin is warm and dry.   Psychiatric: He has a normal mood and affect. His behavior is normal.   Vitals reviewed.    Neurologic Exam     Mental Status   Oriented to person, place, and time.   Level of consciousness: alert  Knowledge: good.   Normal comprehension.     Motor Exam   Muscle bulk: normal  Overall muscle tone: normal    Strength   Strength 5/5 throughout.     Sensory Exam   Right arm light touch: normal  Left arm light touch: normal  Right leg light touch: normal  Left leg light touch: lateral thigh.  Vibration normal.   Pinprick normal.        Temperature intact to cold hortensia UE/LE     Gait, Coordination, and Reflexes     Gait  Gait: normal    Coordination   Tandem walking coordination: normal    Reflexes   Right brachioradialis: 2+  Left brachioradialis: 2+  Right biceps: 2+  Left biceps: 2+  Right triceps: 2+  Left triceps: 2+  Right patellar: 2+  Left patellar: 2+  Right achilles: 2+  Left achilles: 2+  Right Pritchett: absent  Left Pritchett: absent      Assessment/Plan   Independent Review of Radiographic Studies:    MRI of cervical spine from [facility] dated August 10, 2017 was reviewed.  Postoperative changes at  C5/6 and C6 7 noted.  There is disc osteophyte complex at C4/5.  This causes mild indentation of the anterior cord with mild to moderate central stenosis.  There appears to be some disc material in the left neural foramen as well.    Medical Decision Making:    Patient presents for a new complaints of neck pain and bilateral arm pain left greater than right.  He has sensory changes down the left arm into the middle finger.  He also reports some sensory changes in the left anterior and posterior lateral thigh.    His exam is as noted above.  There is no strength or sensory changes on exam except at the left anterior lateral thigh.  Reflexes are intact.  No myelopathic signs.  MRI as noted above.  I do think that I see some possible disc material in the left neural foramen as a far lateral disc.  This could be some of his complaints regarding his left sided arm symptoms.  However, it does not explain the symptoms down into his fingers or anything on the right.    I recommended some conservative measures to begin.  Cervical x-rays including flexion-extension and physical therapy.  Trial of steroid dose pack although I explained to him his symptoms have been around a bit so it may not help.  He would like to try it any way.  We will see him back in about 6 weeks.  If he continues to have symptoms we will consider EMG/NCV of both the arms and the leg.  He has a cervical traction machine at home.  I told him it's find he uses it might be beneficial.  He should discuss it with the physical therapist with regard to what settings to put it on.    Plan: Medrol Dosepak.  Cervical x-rays including flexion-extension.  Physical therapy twice weekly for 4-6 weeks.  Home traction.    Jj was seen today for neck pain and arm pain.    Diagnoses and all orders for this visit:    Neck pain of over 3 months duration  -     Ambulatory Referral to Physical Therapy Evaluate and treat  -     XR spine cervical complete w flex ext;  Laterality: N/A;  Esophagus dilated to 18mm with 12mm-mm to 15mm, then 15mm to 18mm balloon.    ENDOSCOPY N/A 10/31/2024    Procedure: ESOPHAGOGASTRODUODENOSCOPY WITH BIOPSY;  Surgeon: Carlos Dior MD;  Location:  [facility];  Service: Gastroenterology;  Laterality: N/A;    ERCP N/A 9/5/2024    Procedure: ENDOSCOPIC RETROGRADE CHOLANGIOPANCREATOGRAPHY;  Surgeon: Anthony Arambula MD;  Location:  [facility];  Service: Gastroenterology;  Laterality: N/A;  Sphincterotomy made to ampula of common bile duct (CBD), CBD swept with 9mm-12mm balloon - sludge, stones and purulent appearing drainage extracted. 10x7 Kiswahili biliary stent depolyed into CBD. ERCP scope removed with balloon intact.    ERCP N/A 10/31/2024    Procedure: ENDOSCOPIC RETROGRADE CHOLANGIOPANCREATOGRAPHY;  Surgeon: Carlos Dior MD;  Location:  [facility];  Service: Gastroenterology;  Laterality: N/A;    INTERVENTIONAL RADIOLOGY PROCEDURE N/A 3/4/2025    Procedure: Coil Embolization - Epitaxis;  Surgeon: Bipin Eldridge MD;  Location: [facility] CATH INVASIVE LOC[facility];  Service: Interventional Radiology;  Laterality: N/A;    KIDNEY STONE SURGERY      x1       Family History:  family history includes Alzheimer's disease in his mother; COPD in his father; Cancer in his father; Diabetes in his brother; Kidney disease in his father; Lung cancer in his father; No Known Problems in his daughter, daughter, and daughter.     Social History:  reports that he quit smoking about 65 years ago. His smoking use included cigarettes. He started smoking about 78 years ago. He has a 15 pack-year smoking history. He has been exposed to tobacco smoke. He quit smokeless tobacco use about 14 years ago.  His smokeless tobacco use included chew. He reports that he does not drink alcohol and does not use drugs.  Social History     Social History Narrative    Caffeine: 1 cup of decaff coffee daily        Medications:  Coenzyme Q10,  Future    Degenerative disc disease, cervical  -     Ambulatory Referral to Physical Therapy Evaluate and treat  -     XR spine cervical complete w flex ext; Future    Pain in both upper extremities  -     Ambulatory Referral to Physical Therapy Evaluate and treat  -     XR spine cervical complete w flex ext; Future    Numbness of left anterior thigh  -     Ambulatory Referral to Physical Therapy Evaluate and treat  -     XR spine cervical complete w flex ext; Future    Other orders  -     MethylPREDNISolone (MEDROL, CASSIE,) 4 MG tablet; Take as directed on package instructions.      Return in about 6 weeks (around 10/27/2017) for Follow-up with NP, with imaging, After PT.                  Probiotic Product, albuterol sulfate HFA, allopurinol, ascorbic acid, aspirin, digoxin, docusate sodium, donepezil, finasteride, furosemide, melatonin, memantine, metFORMIN, methenamine, metoprolol tartrate, omeprazole, ondansetron, pravastatin, sertraline, and tiotropium bromide-olodaterol    Allergies   Allergen Reactions    Sglt2 Inhibitors Other (See Comments)     Recurrent UTI    Xarelto [Rivaroxaban] GI Bleeding     GI bleed    Penicillins Hives     Has tolerated cefepime, ceftriaxone, cefazolin, cefdinir, cefuroxime, cephalexin       Objective   Objective     Vital Signs:   Temp:  [97.7 °F (36.5 °C)-98.1 °F (36.7 °C)] 97.7 °F (36.5 °C)  Heart Rate:  [68-84] 80  Resp:  [16] 16  BP: (149-168)/() 167/109    Physical Exam  Constitutional:       General: He is not in acute distress.  HENT:      Head: Normocephalic and atraumatic.      Mouth/Throat:      Mouth: Mucous membranes are moist.   Eyes:      General: No scleral icterus.     Conjunctiva/sclera: Conjunctivae normal.   Cardiovascular:      Rate and Rhythm: Normal rate. Rhythm irregular.      Heart sounds: No murmur heard.     No friction rub. No gallop.      Comments: Wearing knee high compression socks.   Pulmonary:      Breath sounds: Examination of the right-lower field reveals decreased breath sounds. Examination of the left-lower field reveals decreased breath sounds. Decreased breath sounds present.   Abdominal:      General: Bowel sounds are normal. There is no distension.      Tenderness: There is no abdominal tenderness.   Musculoskeletal:      Right lower leg: Edema present.      Left lower leg: Edema present.   Skin:     General: Skin is warm and dry.   Neurological:      General: No focal deficit present.      Mental Status: He is alert and oriented to person, place, and time.   Psychiatric:         Mood and Affect: Mood normal.         Behavior: Behavior normal.        Result Review:  I have personally reviewed the results from the time of  this admission to 6/2/2025 16:33 EDT and agree with these findings:  [x]  Laboratory list / accordion  []  Microbiology  [x]  Radiology  [x]  EKG/Telemetry   [x]  Cardiology/Vascular   []  Pathology  [x]  Old records  []  Other:    LAB RESULTS:      Lab 06/02/25  1239   WBC 6.65   HEMOGLOBIN 14.9   HEMATOCRIT 47.2   PLATELETS 154   NEUTROS ABS 5.07   IMMATURE GRANS (ABS) 0.04   LYMPHS ABS 0.65*   MONOS ABS 0.67   EOS ABS 0.18   MCV 91.3   PROCALCITONIN 0.03   LACTATE 1.9         Lab 06/02/25  1239   SODIUM 139   POTASSIUM 4.1   CHLORIDE 97*   CO2 29.0   ANION GAP 13.0   BUN 15.6   CREATININE 1.03   EGFR 69.9   GLUCOSE 110*   CALCIUM 9.2         Lab 06/02/25  1239   TOTAL PROTEIN 7.5   ALBUMIN 3.4*   GLOBULIN 4.1   ALT (SGPT) 15   AST (SGOT) 27   BILIRUBIN 1.4*   ALK PHOS 181*         Lab 06/02/25  1520 06/02/25  1239   PROBNP  --  2,666.0*   HSTROP T 39* 40*                 Brief Urine Lab Results  (Last result in the past 365 days)        Color   Clarity   Blood   Leuk Est   Nitrite   Protein   CREAT   Urine HCG        03/12/25 1514 Yellow   Clear   Negative   Negative   Negative   Negative                 Microbiology Results (last 10 days)       Procedure Component Value - Date/Time    COVID PRE-OP / PRE-PROCEDURE SCREENING ORDER (NO ISOLATION) - Swab, Nasal Cavity [[id number]]  (Abnormal) Collected: 06/02/25 1259    Lab Status: Final result Specimen: Swab from Nasal Cavity Updated: 06/02/25 1417    Narrative:      The following orders were created for panel order COVID PRE-OP / PRE-PROCEDURE SCREENING ORDER (NO ISOLATION) - Swab, Nasal Cavity.  Procedure                               Abnormality         Status                     ---------                               -----------         ------                     Respiratory Panel PCR w/...[[id number]]  Abnormal            Final result                 Please view results for these tests on the individual orders.    Respiratory Panel PCR w/COVID-19(SARS-CoV-2)  [facility] In-House, NP Swab in UTM/VTM, 2 HR TAT - Swab, Nasopharynx [[id number]]  (Abnormal) Collected: 06/02/25 1259    Lab Status: Final result Specimen: Swab from Nasopharynx Updated: 06/02/25 1417     ADENOVIRUS, PCR Not Detected     Coronavirus 229E Not Detected     Coronavirus HKU1 Not Detected     Coronavirus NL63 Not Detected     Coronavirus OC43 Not Detected     COVID19 Not Detected     Human Metapneumovirus Not Detected     Human Rhinovirus/Enterovirus Detected     Influenza A PCR Not Detected     Influenza B PCR Not Detected     Parainfluenza Virus 1 Not Detected     Parainfluenza Virus 2 Not Detected     Parainfluenza Virus 3 Not Detected     Parainfluenza Virus 4 Not Detected     RSV, PCR Not Detected     Bordetella pertussis pcr Not Detected     Bordetella parapertussis PCR Not Detected     Chlamydophila pneumoniae PCR Not Detected     Mycoplasma pneumo by PCR Not Detected    Narrative:      In the setting of a positive respiratory panel with a viral infection PLUS a negative procalcitonin without other underlying concern for bacterial infection, consider observing off antibiotics or discontinuation of antibiotics and continue supportive care. If the respiratory panel is positive for atypical bacterial infection (Bordetella pertussis, Chlamydophila pneumoniae, or Mycoplasma pneumoniae), consider antibiotic de-escalation to target atypical bacterial infection.            XR Chest 1 View  Result Date: 6/2/2025  XR CHEST 1 VW Date of Exam: 6/2/2025 1:02 PM EDT Indication: SOA triage protocol Comparison: CT chest without contrast 3/10/2025, PET/CT 3/27/2025 Findings: Heart size is normal for technique. The right lung is without consolidation. There are stable elevation of the left diaphragm with left basilar airspace disease and small left lateral effusion. Left basilar pulmonary nodule better assessed on prior chest  CT and prior PET/CT. Left atrial appendage occlusion device noted.      Impression: Impression: 1. Stable elevation of the left hemidiaphragm with left basilar airspace disease and small left pleural effusion. 2. Left basilar pulmonary nodule better assessed on recent chest CT and PET/CT. 3. Clear right lung. Electronically Signed: Gary Oliva MD  6/2/2025 1:59 PM EDT  Workstation ID: [id number]      Results for orders placed during the hospital encounter of 11/21/24    Adult Transesophageal Echo 3D (FARHAD) W/ Cont If Necessary Per Protocol    Interpretation Summary    There is a 27mm Watchman FLX device in place. It appears well seated and well compressed with no device related thrombus seen. There is a small jet of color flow, 2.5 mm, at the superior aspect adjacent to the left upper pulmonary vein. This was not seen previous examination however image quality is improved compared to prior.    Left ventricular systolic function is moderately decreased. Left ventricular ejection fraction appears to be 36 - 40%. Normal left ventricular wall thickness noted. The left ventricular cavity is dilated. There is left ventricular global hypokinesis noted.    : The right ventricular cavity is mildly dilated. Mildly reduced right ventricular systolic function noted.    : The left atrial cavity is severely dilated. No evidence of a left atrial thrombus present. There is light spontaneous echo contrast present in the atrial body.    The right atrial cavity is severely dilated.    There is mild, bileaflet mitral valve thickening present. Mild mitral valve regurgitation is present. No significant mitral valve stenosis is present.    Mild tricuspid valve regurgitation is present. Estimated right ventricular systolic pressure from tricuspid regurgitation is mildly elevated (35-45 mmHg).    There is moderate pulmonic valve regurgitation present.      Assessment & Plan   Assessment & Plan       COPD exacerbation    Acute hypoxic respiratory failure    Rhinovirus infection    Mixed hyperlipidemia    HTN  (hypertension)    CKD (chronic kidney disease) stage 3, GFR 30-59 ml/min    Coronary artery disease involving native coronary artery of native heart without angina pectoris    Primary malignant neoplasm of left lower lobe of lung    Permanent atrial fibrillation    GERD without esophagitis    Gout    Heart failure with mildly reduced ejection fraction (HFmrEF)    Hospital Course to date:  Darien Camarena is a 89 y.o. male PMH Afib s/p watchman, HTN, HLD, CAD, GERD, Gout, LLL lung cancer, HFmrEF, STEVEN, T2DM, CKDIII, hx of tobacco smoking (quit at least 50 years ago) presenting with shortness of breath with exertion, dizziness, cough and fatigue.       Acute hypoxic respiratory failure  COPD exacerbating  Rhinovirus infection  - received decadron in the ED, continue with prednisone 40 mg daily x 5 days starting 06/03/25  - duo nebs  - mucinex  - tessalon perles PRN  - oxygen to maintain sat >90%  - WBC 6.65, Procal WNL, lactic acid WNL  - CXR: stable elevation of Left hemidiaphragm with left basilar airspace disea and small left pleural effusion. Left basilar pulmonary nodule better assessed on recent chest CT and PET/CT. Right lung clear.     Carcinoma of the Left lower lung  - presumed primary bronchogenic  - 2.2 cm LL pulmonary nodule on CT scan and on PET scan.   - Pt did not wish to have biopsy and underwent tx with a course of SBRT delivered on [employer]. He completed tx 02/24/23 with good response.   - CT surveillance 12/09/24 showed a mod left pleural effusion with left basilar atelectasis, obscuring the view. Recommended repeat CT in a month.   - Repeat CT scan 3/11/25 showed post radiation changes in the left lower lobe but there was some increased nodularity. The PET scan did not show recurrent disease in the area directly adjacent to what had been treated previously.     HFmrEF  - ECHO EF 36-40% (11/2024)  - BNP 2,666  - monitor I/O  - daily weight  - lasix    Elevated troponin  - pt denies chest  pain  - HS troponin T 40 with EKG showing to significant change when compared to EKG 03/03/25. Troponin trending downward with no significant EKG changes.     Afib s/p watchman  - digoxin, metoprolol    T2DM  - pt does not take any meds for diabetes  - will monitor closely due to steroid therapy  - accu checks with low dose SSI  - adjust if needed    STEVEN  - pt does not wear CPAP machine and doesn't use oxygen at home    HTN/HLD/CAD  - ASA, pravastatin    CKDIII  - stable with Cr 1.03    GERD  - omeprazole    Gout  - allopurinol    Memory decline  - zoloft, aricept, Namenda    BPH  - proscar      Total time spent: 85 min  Time spent includes time reviewing chart, face-to-face time, counseling patient/family/caregiver, ordering medications/tests/procedures, communicating with other health care professionals, documenting clinical information in the electronic health record, and coordination of care.       DVT prophylaxis:  [facility]    CODE STATUS:    Code Status (Patient has no pulse and is not breathing): CPR (Attempt to Resuscitate)  Medical Interventions (Patient has pulse or is breathing): Full Support  Level Of Support Discussed With: Patient      Expected Discharge 04/18/2025    This note has been completed as part of a split-shared workflow.     Signature: Electronically signed by BERYL Lilly, 06/02/25, 4:16 PM EDT.

## 2025-06-02 NOTE — ED NOTES
Darien Camarena    Nursing Report ED to Floor:  Mental status: A&Ox4  Ambulatory status: Ad Sheron  Oxygen Therapy:  2L nc  Cardiac Rhythm: nsr  Admitted from: home  Safety Concerns:  none  Precautions: none  Social Issues: none  ED Room #:  02    ED Nurse Phone Extension - [phone] or may call [phone].      HPI:   Chief Complaint   Patient presents with    Shortness of Breath       Past Medical History:  Past Medical History:   Diagnosis Date    Arrhythmia     Atrial fibrillation     Cataract     CHF (congestive heart failure)     Chronic kidney disease     Clotting disorder     COPD (chronic obstructive pulmonary disease)     Coronary artery disease     Dementia     Diabetes mellitus     doesnt check sugar    E. coli sepsis 06/23/2022    Enlarged prostate without lower urinary tract symptoms (luts) 06/20/2016    Full dentures     GERD (gastroesophageal reflux disease)     Gout     Hearing aid worn     bilat prn    History of colonoscopy 09/12/2012    History of radiation therapy 02/24/2023    SBRT LLL lung    [place] (hard of hearing)     hearing aids prn    Hyperlipidemia     Hypertension     Kidney stone     surgery x1    Lung cancer     STEVEN on CPAP     compliant with machine    PAF (paroxysmal atrial fibrillation)     Pneumonia 12/22    Primary central sleep apnea     Prostatism     Urinary incontinence     Urinary tract infection     Wears glasses     readers        Past Surgical History:  Past Surgical History:   Procedure Laterality Date    ATRIAL APPENDAGE EXCLUSION LEFT WITH TRANSESOPHAGEAL ECHOCARDIOGRAM N/A 11/28/2023    Procedure: Atrial Appendage Occlusion;  Surgeon: Anthony Mcdaniel MD;  Location:  [facility] INVASIVE [facility];  Service: Cardiovascular;  Laterality: N/A;    CARDIAC CATHETERIZATION Left 09/29/2022    Procedure: Left Heart Cath;  Surgeon: Titus Oliveros IV, MD;  Location:  [facility] CATH INVASIVE [facility];  Service: Cardiovascular;  Laterality: Left;    CARDIOVERSION      CATARACT EXTRACTION  Bilateral     COLONOSCOPY      CYSTOSCOPY      ENDOSCOPY      possible    ENDOSCOPY N/A 9/5/2024    Procedure: ESOPHAGOGASTRODUODENOSCOPY;  Surgeon: Anthony Arambula MD;  Location:  [facility];  Service: Gastroenterology;  Laterality: N/A;  Esophagus dilated to 18mm with 12mm-mm to 15mm, then 15mm to 18mm balloon.    ENDOSCOPY N/A 10/31/2024    Procedure: ESOPHAGOGASTRODUODENOSCOPY WITH BIOPSY;  Surgeon: Carlos Dior MD;  Location:  [facility];  Service: Gastroenterology;  Laterality: N/A;    ERCP N/A 9/5/2024    Procedure: ENDOSCOPIC RETROGRADE CHOLANGIOPANCREATOGRAPHY;  Surgeon: Anthony Arambula MD;  Location:  [facility];  Service: Gastroenterology;  Laterality: N/A;  Sphincterotomy made to ampula of common bile duct (CBD), CBD swept with 9mm-12mm balloon - sludge, stones and purulent appearing drainage extracted. 10x7 Spanish biliary stent depolyed into CBD. ERCP scope removed with balloon intact.    ERCP N/A 10/31/2024    Procedure: ENDOSCOPIC RETROGRADE CHOLANGIOPANCREATOGRAPHY;  Surgeon: Carlos Dior MD;  Location:  [facility];  Service: Gastroenterology;  Laterality: N/A;    INTERVENTIONAL RADIOLOGY PROCEDURE N/A 3/4/2025    Procedure: Coil Embolization - Epitaxis;  Surgeon: Bipin Eldridge MD;  Location: [facility] CATH INVASIVE LOCATION;  Service: Interventional Radiology;  Laterality: N/A;    KIDNEY STONE SURGERY      x1        Admitting Doctor:   Tyree Jim MD    Consulting Provider(s):  Consults       No orders found from 5/4/2025 to 6/3/2025.             Admitting Diagnosis:   The primary encounter diagnosis was Hypoxemia. A diagnosis of COPD (chronic obstructive pulmonary disease) with acute bronchitis was also pertinent to this visit.    Most Recent Vitals:   Vitals:    06/02/25 1449 06/02/25 1457 06/02/25 1500 06/02/25 1530   BP:   (!) 159/105 (!) 155/109   BP Location:       Patient Position:       Pulse: 84 68 80 80   Resp:       Temp:        TempSrc:       SpO2: (!) 89% (!) 88% (!) 85% 90%   Weight:       Height:           Active LDAs/IV Access:   Lines, Drains & Airways       Active LDAs       Name Placement date Placement time Site Days    Peripheral IV 03/04/25 0953 Left Forearm 03/04/25  0953  Forearm  90    Peripheral IV 06/02/25 1242 20 G Anterior;Right Forearm 06/02/25  1242  Forearm  less than 1    Urethral Catheter Non-latex;Silicone 16 Fr. 12/05/24  2130  -- 178                    Labs (abnormal labs have a star):   Labs Reviewed   RESPIRATORY PANEL PCR W/ COVID-19 (SARS-COV-2), NP SWAB IN UTM/VTP, 2 HR TAT - Abnormal; Notable for the following components:       Result Value    Human Rhinovirus/Enterovirus Detected (*)     All other components within normal limits    Narrative:     In the setting of a positive respiratory panel with a viral infection PLUS a negative procalcitonin without other underlying concern for bacterial infection, consider observing off antibiotics or discontinuation of antibiotics and continue supportive care. If the respiratory panel is positive for atypical bacterial infection (Bordetella pertussis, Chlamydophila pneumoniae, or Mycoplasma pneumoniae), consider antibiotic de-escalation to target atypical bacterial infection.   COMPREHENSIVE METABOLIC PANEL - Abnormal; Notable for the following components:    Glucose 110 (*)     Chloride 97 (*)     Albumin 3.4 (*)     Alkaline Phosphatase 181 (*)     Total Bilirubin 1.4 (*)     All other components within normal limits    Narrative:     GFR Categories in Chronic Kidney Disease (CKD)              GFR Category          GFR (mL/min/1.73)    Interpretation  G1                    90 or greater        Normal or high (1)  G2                    60-89                Mild decrease (1)  G3a                   45-59                Mild to moderate decrease  G3b                   30-44                Moderate to severe decrease  G4                    15-29                Severe  decrease  G5                    14 or less           Kidney failure    (1)In the absence of evidence of kidney disease, neither GFR category G1 or G2 fulfill the criteria for CKD.    eGFR calculation 2021 CKD-EPI creatinine equation, which does not include race as a factor   BNP (IN-HOUSE) - Abnormal; Notable for the following components:    proBNP 2,666.0 (*)     All other components within normal limits    Narrative:     This assay is used as an aid in the diagnosis of individuals suspected of having heart failure. It can be used as an aid in the diagnosis of acute decompensated heart failure (ADHF) in patients presenting with signs and symptoms of ADHF to the emergency department (ED). In addition, NT-proBNP of <300 pg/mL indicates ADHF is not likely.    Age Range Result Interpretation  NT-proBNP Concentration (pg/mL:      <50             Positive            >450                   Gray                 300-450                    Negative             <300    50-75           Positive            >900                  Gray                300-900                  Negative            <300      >75             Positive            >1800                  Gray                300-1800                  Negative            <300   TROPONIN - Abnormal; Notable for the following components:    HS Troponin T 40 (*)     All other components within normal limits    Narrative:     High Sensitive Troponin T Reference Range:  <14.0 ng/L- Negative Female for AMI  <22.0 ng/L- Negative Male for AMI  >=14 - Abnormal Female indicating possible myocardial injury.  >=22 - Abnormal Male indicating possible myocardial injury.   Clinicians would have to utilize clinical acumen, EKG, Troponin, and serial changes to determine if it is an Acute Myocardial Infarction or myocardial injury due to an underlying chronic condition.        CBC WITH AUTO DIFFERENTIAL - Abnormal; Notable for the following components:    RDW 16.4 (*)     Neutrophil % 76.2  "(*)     Lymphocyte % 9.8 (*)     Immature Grans % 0.6 (*)     Lymphocytes, Absolute 0.65 (*)     All other components within normal limits   LACTIC ACID, PLASMA - Normal   PROCALCITONIN - Normal    Narrative:     As a Marker for Sepsis (Non-Neonates):    1. <0.5 ng/mL represents a low risk of severe sepsis and/or septic shock.  2. >2 ng/mL represents a high risk of severe sepsis and/or septic shock.    As a Marker for Lower Respiratory Tract Infections that require antibiotic therapy:    PCT on Admission    Antibiotic Therapy       6-12 Hrs later    >0.5                Strongly Recommended  >0.25 - <0.5        Recommended   0.1 - 0.25          Discouraged              Remeasure/reassess PCT  <0.1                Strongly Discouraged     Remeasure/reassess PCT    As 28 day mortality risk marker: \"Change in Procalcitonin Result\" (>80% or <=80%) if Day 0 (or Day 1) and Day 4 values are available. Refer to http://www[facility]    Change in PCT <=80%  A decrease of PCT levels below or equal to 80% defines a positive change in PCT test result representing a higher risk for 28-day all-cause mortality of patients diagnosed with severe sepsis for septic shock.    Change in PCT >80%  A decrease of PCT levels of more than 80% defines a negative change in PCT result representing a lower risk for 28-day all-cause mortality of patients diagnosed with severe sepsis or septic shock.      COVID PRE-OP / PRE-PROCEDURE SCREENING ORDER (NO ISOLATION)    Narrative:     The following orders were created for panel order COVID PRE-OP / PRE-PROCEDURE SCREENING ORDER (NO ISOLATION) - Swab, Nasal Cavity.  Procedure                               Abnormality         Status                     ---------                               -----------         ------                     Respiratory Panel PCR w/...[[id number]]  Abnormal            Final result                 Please view results for these tests on the individual orders. "   RAINBOW DRAW    Narrative:     The following orders were created for panel order [place] Draw.  Procedure                               Abnormality         Status                     ---------                               -----------         ------                     Green Top (Gel)[[id number]]                                  Final result               Lavender Top[[id number]]                                     Final result               Gold Top - SST[[id number]]                                   Final result               Martin Top[[id number]]                                         Final result               Light Blue Top[[id number]]                                   Final result                 Please view results for these tests on the individual orders.   HIGH SENSITIVITIY TROPONIN T 1HR   CBC AND DIFFERENTIAL    Narrative:     The following orders were created for panel order CBC & Differential.  Procedure                               Abnormality         Status                     ---------                               -----------         ------                     CBC Auto Differential[[id number]]        Abnormal            Final result                 Please view results for these tests on the individual orders.   GREEN TOP   LAVENDER TOP   GOLD TOP - SST   GRAY TOP   LIGHT BLUE TOP       Meds Given in ED:   Medications   sodium chloride 0.9 % flush 10 mL (has no administration in time range)   ipratropium-albuterol (DUO-NEB) nebulizer solution 3 mL (3 mL Nebulization Given 6/2/25 1689)   dexAMETHasone (DECADRON) injection 10 mg (10 mg Intravenous Given 6/2/25 2795)           Last NIH score:                                                          Dysphagia screening results:        [place] Coma Scale:  No data recorded     CIWA:        Restraint Type:            Isolation Status:  Contact, Droplet

## 2025-06-02 NOTE — ED PROVIDER NOTES
Subjective   History of Present Illness    Pt presents for shortness of breath.    He says yesterday he was coughing all day. No sputum.  Today he is not coughing.  He thinks his oxygen has been low today.  No shortness of breath.  No fever.  No leg pain or swelling.      Family says he has been short of breath and has had low oxygen.  Last night oxygen was 89%.  No home oxygen.  She says he has had some dizziness this morning.  She says they went to [facility] and were directed to the ED from there.      He has lung cancer and is being treated with radiation.  No surgery or chemo.  Last radiation was a week or two ago.    History provided by:  Patient and relative      Review of Systems    Past Medical History:   Diagnosis Date    Arrhythmia     Atrial fibrillation     Cataract     CHF (congestive heart failure)     Chronic kidney disease     Clotting disorder     COPD (chronic obstructive pulmonary disease)     Coronary artery disease     Dementia     Diabetes mellitus     doesnt check sugar    E. coli sepsis 06/23/2022    Enlarged prostate without lower urinary tract symptoms (luts) 06/20/2016    Full dentures     GERD (gastroesophageal reflux disease)     Gout     Hearing aid worn     bilat prn    History of colonoscopy 09/12/2012    History of radiation therapy 02/24/2023    SBRT LLL lung    [place] (hard of hearing)     hearing aids prn    Hyperlipidemia     Hypertension     Kidney stone     surgery x1    Lung cancer     STEVEN on CPAP     compliant with machine    PAF (paroxysmal atrial fibrillation)     Pneumonia 12/22    Primary central sleep apnea     Prostatism     Urinary incontinence     Urinary tract infection     Wears glasses     readers       Allergies   Allergen Reactions    Sglt2 Inhibitors Other (See Comments)     Recurrent UTI    Xarelto [Rivaroxaban] GI Bleeding     GI bleed    Penicillins Hives     Has tolerated cefepime, ceftriaxone, cefazolin, cefdinir, cefuroxime, cephalexin       Past Surgical History:    Procedure Laterality Date    ATRIAL APPENDAGE EXCLUSION LEFT WITH TRANSESOPHAGEAL ECHOCARDIOGRAM N/A 11/28/2023    Procedure: Atrial Appendage Occlusion;  Surgeon: Anthony Mcdaniel MD;  Location: [facility] EP INVASIVE LOCATION;  Service: Cardiovascular;  Laterality: N/A;    CARDIAC CATHETERIZATION Left 09/29/2022    Procedure: Left Heart Cath;  Surgeon: Titus Oliveros IV, MD;  Location:  [facility]ATH [facility]VAS[facility];  Service: Cardiovascular;  Laterality: Left;    CARDIOVERSION      CATARACT EXTRACTION Bilateral     COLONOSCOPY      CYSTOSCOPY      ENDOSCOPY      possible    ENDOSCOPY N/A 9/5/2024    Procedure: ESOPHAGOGASTRODUODENOSCOPY;  Surgeon: Anthony Arambula MD;  Location:  [facility];  Service: Gastroenterology;  Laterality: N/A;  Esophagus dilated to 18mm with 12mm-mm to 15mm, then 15mm to 18mm balloon.    ENDOSCOPY N/A 10/31/2024    Procedure: ESOPHAGOGASTRODUODENOSCOPY WITH BIOPSY;  Surgeon: Carlos Dior MD;  Location:  [facility];  Service: Gastroenterology;  Laterality: N/A;    ERCP N/A 9/5/2024    Procedure: ENDOSCOPIC RETROGRADE CHOLANGIOPANCREATOGRAPHY;  Surgeon: Anthony Arambula MD;  Location:  [facility];  Service: Gastroenterology;  Laterality: N/A;  Sphincterotomy made to ampula of common bile duct (CBD), CBD swept with 9mm-12mm balloon - sludge, stones and purulent appearing drainage extracted. 10x7 Egyptian biliary stent depolyed into CBD. ERCP scope removed with balloon intact.    ERCP N/A 10/31/2024    Procedure: ENDOSCOPIC RETROGRADE CHOLANGIOPANCREATOGRAPHY;  Surgeon: Carlos Dior MD;  Location:  [facility];  Service: Gastroenterology;  Laterality: N/A;    INTERVENTIONAL RADIOLOGY PROCEDURE N/A 3/4/2025    Procedure: Coil Embolization - Epitaxis;  Surgeon: Bipin Eldridge MD;  Location:  [facility]ATH [facility]VAS[facility];  Service: Interventional Radiology;  Laterality: N/A;    KIDNEY STONE SURGERY      x1       Family History   Problem  Relation Age of Onset    Alzheimer's disease Mother     COPD Father     Lung cancer Father     Cancer Father     Kidney disease Father     No Known Problems Daughter     No Known Problems Daughter     No Known Problems Daughter     Diabetes Brother        Social History     Socioeconomic History    Marital status:    Tobacco Use    Smoking status: Former     Current packs/day: 0.00     Average packs/day: 1 pack/day for 15.0 years (15.0 ttl pk-yrs)     Types: Cigarettes     Start date: 1947     Quit date: 1960     Years since quittin.0     Passive exposure: Past    Smokeless tobacco: Former     Types: Chew     Quit date:     Tobacco comments:     started at 13 yo.   Vaping Use    Vaping status: Never Used    Passive vaping exposure: Yes   Substance and Sexual Activity    Alcohol use: No    Drug use: Never    Sexual activity: Not Currently     Partners: Female           Objective   Physical Exam  Vitals and nursing note reviewed.   Constitutional:       General: He is not in acute distress.     Appearance: Normal appearance. He is not ill-appearing.   HENT:      Head: Normocephalic and atraumatic.      Mouth/Throat:      Mouth: Mucous membranes are moist.   Eyes:      General: No scleral icterus.        Right eye: No discharge.         Left eye: No discharge.      Conjunctiva/sclera: Conjunctivae normal.   Cardiovascular:      Rate and Rhythm: Normal rate. Rhythm irregular.      Heart sounds: No murmur heard.  Pulmonary:      Effort: Pulmonary effort is normal. No respiratory distress.      Breath sounds: Wheezing (very mild diffuse) present.   Abdominal:      General: Bowel sounds are normal. There is no distension.      Palpations: Abdomen is soft.      Tenderness: There is no abdominal tenderness. There is no guarding or rebound.   Musculoskeletal:         General: No swelling. Normal range of motion.      Cervical back: Normal range of motion and neck supple.   Skin:     General: Skin is  warm and dry.      Findings: No rash.   Neurological:      General: No focal deficit present.      Mental Status: He is alert and oriented to person, place, and time. Mental status is at baseline.   Psychiatric:         Mood and Affect: Mood normal.         Behavior: Behavior normal.         Thought Content: Thought content normal.         Procedures           ED Course    I reviewed outside records. Cardiology note 5/13/25 notes CAD, CHF, AF, HTN.  Oncology note 3/31/25 notes left lower lobe nodule with positive PET.      EKG read by me AF.    Resp panel positive for rhinovirus.    Labs benign.  Sepsis markers negative.    CXR read by me no lobar infiltrate. Report reviewed.    Nebs given.  He feels better but is borderline hypoxia at 88-92% room air in the bed.  Got him up for trial of ambulation and he became dizzy and had desaturation to 85%.  Returned to the bed, oxygen applied.    Patient stable on serial rechecks.  Discussed exam findings, test results so far and concerns in detail at the bedside.  Discussed need for admission for further evaluation and treatment.  I discussed the patient's presentation, test results and need for admission with the hospitalist.                                                     Medical Decision Making  Problems Addressed:  COPD (chronic obstructive pulmonary disease) with acute bronchitis: chronic illness or injury with exacerbation, progression, or side effects of treatment that poses a threat to life or bodily functions  Hypoxemia: complicated acute illness or injury with systemic symptoms that poses a threat to life or bodily functions    Amount and/or Complexity of Data Reviewed  Independent Historian: caregiver  External Data Reviewed: notes.  Labs: ordered. Decision-making details documented in ED Course.  Radiology: ordered and independent interpretation performed. Decision-making details documented in ED Course.  ECG/medicine tests: ordered and independent  interpretation performed. Decision-making details documented in ED Course.  Discussion of management or test interpretation with external provider(s): Hospitalist     Risk  Prescription drug management.  Decision regarding hospitalization.        Final diagnoses:   COPD (chronic obstructive pulmonary disease) with acute bronchitis   Hypoxemia       ED Disposition  ED Disposition       ED Disposition   Decision to Admit    Condition   --    Comment   Level of Care: Telemetry [5]   Diagnosis: COPD exacerbation [223260]   Certification: I Certify That Inpatient Hospital Services Are Medically Necessary For Greater Than 2 Midnights                 No follow-up provider specified.       Medication List      No changes were made to your prescriptions during this visit.            Oneal Beckwith MD  06/02/25 1682

## 2025-06-02 NOTE — TELEPHONE ENCOUNTER
I would recommend an office visit today or tomorrow with either me or one of my partners.  Pito Way MD  09:18 EDT  06/02/25

## 2025-06-02 NOTE — TELEPHONE ENCOUNTER
Spoke with Nikki daughter  she states she is concerned he may have pneumonia as he has a tight cough and some weakness and dizziness. Some shortness of breath.    Offered her an appointment for tomorrow as todays schedule is full with all providers or to go to the ED.  She states she will take him to the  [facility] for evaluation today.

## 2025-06-02 NOTE — TELEPHONE ENCOUNTER
Daughter called to advise patient is currently at  ED awaiting a room for admission. At this time, they wish to cancel appt without r/s for the time being.  Advised I would send a message to his care team. Understanding was voice with no further questions at this time.

## 2025-06-03 ENCOUNTER — HOSPITAL ENCOUNTER (OUTPATIENT)
Dept: RADIATION ONCOLOGY | Facility: HOSPITAL | Age: 89
Setting detail: RADIATION/ONCOLOGY SERIES
Discharge: HOME | End: 2025-06-03
Payer: MEDICARE

## 2025-06-03 ENCOUNTER — APPOINTMENT (OUTPATIENT)
Dept: RADIATION ONCOLOGY | Facility: HOSPITAL | Age: 89
End: 2025-06-03
Payer: MEDICARE

## 2025-06-03 LAB
ANION GAP SERPL CALCULATED.3IONS-SCNC: 11 MMOL/L (ref 5–15)
BASOPHILS # BLD AUTO: 0.01 10*3/MM3 (ref 0–0.2)
BASOPHILS NFR BLD AUTO: 0.2 % (ref 0–1.5)
BUN SERPL-MCNC: 23.2 MG/DL (ref 8–23)
BUN/CREAT SERPL: 21.3 (ref 7–25)
CALCIUM SPEC-SCNC: 9.3 MG/DL (ref 8.6–10.5)
CHLORIDE SERPL-SCNC: 97 MMOL/L (ref 98–107)
CO2 SERPL-SCNC: 29 MMOL/L (ref 22–29)
CREAT SERPL-MCNC: 1.09 MG/DL (ref 0.76–1.27)
DEPRECATED RDW RBC AUTO: 52.7 FL (ref 37–54)
EGFRCR SERPLBLD CKD-EPI 2021: 65.3 ML/MIN/1.73
EOSINOPHIL # BLD AUTO: 0 10*3/MM3 (ref 0–0.4)
EOSINOPHIL NFR BLD AUTO: 0 % (ref 0.3–6.2)
ERYTHROCYTE [DISTWIDTH] IN BLOOD BY AUTOMATED COUNT: 15.9 % (ref 12.3–15.4)
GLUCOSE BLDC GLUCOMTR-MCNC: 148 MG/DL (ref 70–130)
GLUCOSE BLDC GLUCOMTR-MCNC: 154 MG/DL (ref 70–130)
GLUCOSE BLDC GLUCOMTR-MCNC: 186 MG/DL (ref 70–130)
GLUCOSE BLDC GLUCOMTR-MCNC: 384 MG/DL (ref 70–130)
GLUCOSE SERPL-MCNC: 165 MG/DL (ref 65–99)
HCT VFR BLD AUTO: 46.6 % (ref 37.5–51)
HGB BLD-MCNC: 14.9 G/DL (ref 13–17.7)
IMM GRANULOCYTES # BLD AUTO: 0.02 10*3/MM3 (ref 0–0.05)
IMM GRANULOCYTES NFR BLD AUTO: 0.5 % (ref 0–0.5)
LYMPHOCYTES # BLD AUTO: 0.5 10*3/MM3 (ref 0.7–3.1)
LYMPHOCYTES NFR BLD AUTO: 11.5 % (ref 19.6–45.3)
MCH RBC QN AUTO: 29 PG (ref 26.6–33)
MCHC RBC AUTO-ENTMCNC: 32 G/DL (ref 31.5–35.7)
MCV RBC AUTO: 90.8 FL (ref 79–97)
MONOCYTES # BLD AUTO: 0.17 10*3/MM3 (ref 0.1–0.9)
MONOCYTES NFR BLD AUTO: 3.9 % (ref 5–12)
NEUTROPHILS NFR BLD AUTO: 3.64 10*3/MM3 (ref 1.7–7)
NEUTROPHILS NFR BLD AUTO: 83.9 % (ref 42.7–76)
NRBC BLD AUTO-RTO: 0 /100 WBC (ref 0–0.2)
PLATELET # BLD AUTO: 145 10*3/MM3 (ref 140–450)
PMV BLD AUTO: 9.8 FL (ref 6–12)
POTASSIUM SERPL-SCNC: 3.9 MMOL/L (ref 3.5–5.2)
RBC # BLD AUTO: 5.13 10*6/MM3 (ref 4.14–5.8)
SODIUM SERPL-SCNC: 137 MMOL/L (ref 136–145)
WBC NRBC COR # BLD AUTO: 4.34 10*3/MM3 (ref 3.4–10.8)

## 2025-06-03 PROCEDURE — 94799 UNLISTED PULMONARY SVC/PX: CPT

## 2025-06-03 PROCEDURE — 85025 COMPLETE CBC W/AUTO DIFF WBC: CPT | Performed by: NURSE PRACTITIONER

## 2025-06-03 PROCEDURE — 94664 DEMO&/EVAL PT USE INHALER: CPT

## 2025-06-03 PROCEDURE — 97110 THERAPEUTIC EXERCISES: CPT

## 2025-06-03 PROCEDURE — 25010000002 HEPARIN (PORCINE) PER 1000 UNITS: Performed by: NURSE PRACTITIONER

## 2025-06-03 PROCEDURE — 63710000001 INSULIN LISPRO (HUMAN) PER 5 UNITS: Performed by: NURSE PRACTITIONER

## 2025-06-03 PROCEDURE — 99232 SBSQ HOSP IP/OBS MODERATE 35: CPT | Performed by: STUDENT IN AN ORGANIZED HEALTH CARE EDUCATION/TRAINING PROGRAM

## 2025-06-03 PROCEDURE — 82948 REAGENT STRIP/BLOOD GLUCOSE: CPT

## 2025-06-03 PROCEDURE — 63710000001 PREDNISONE PER 1 MG: Performed by: NURSE PRACTITIONER

## 2025-06-03 PROCEDURE — 80048 BASIC METABOLIC PNL TOTAL CA: CPT | Performed by: NURSE PRACTITIONER

## 2025-06-03 PROCEDURE — 94761 N-INVAS EAR/PLS OXIMETRY MLT: CPT

## 2025-06-03 PROCEDURE — 97162 PT EVAL MOD COMPLEX 30 MIN: CPT

## 2025-06-03 PROCEDURE — 97165 OT EVAL LOW COMPLEX 30 MIN: CPT

## 2025-06-03 RX ADMIN — GUAIFENESIN 600 MG: 600 TABLET, EXTENDED RELEASE ORAL at 09:50

## 2025-06-03 RX ADMIN — HEPARIN SODIUM 5000 UNITS: 5000 INJECTION INTRAVENOUS; SUBCUTANEOUS at 20:32

## 2025-06-03 RX ADMIN — DONEPEZIL HYDROCHLORIDE 10 MG: 10 TABLET, FILM COATED ORAL at 20:33

## 2025-06-03 RX ADMIN — IPRATROPIUM BROMIDE AND ALBUTEROL SULFATE 3 ML: 2.5; .5 SOLUTION RESPIRATORY (INHALATION) at 06:55

## 2025-06-03 RX ADMIN — INSULIN LISPRO 2 UNITS: 100 INJECTION, SOLUTION INTRAVENOUS; SUBCUTANEOUS at 13:34

## 2025-06-03 RX ADMIN — METOPROLOL TARTRATE 50 MG: 50 TABLET, FILM COATED ORAL at 20:32

## 2025-06-03 RX ADMIN — FINASTERIDE 5 MG: 5 TABLET, FILM COATED ORAL at 09:50

## 2025-06-03 RX ADMIN — MEMANTINE 10 MG: 10 TABLET ORAL at 09:51

## 2025-06-03 RX ADMIN — DIGOXIN 125 MCG: 125 TABLET ORAL at 09:50

## 2025-06-03 RX ADMIN — HEPARIN SODIUM 5000 UNITS: 5000 INJECTION INTRAVENOUS; SUBCUTANEOUS at 09:51

## 2025-06-03 RX ADMIN — Medication 10 ML: at 09:53

## 2025-06-03 RX ADMIN — PRAVASTATIN SODIUM 40 MG: 40 TABLET ORAL at 20:32

## 2025-06-03 RX ADMIN — PREDNISONE 40 MG: 20 TABLET ORAL at 09:51

## 2025-06-03 RX ADMIN — MEMANTINE 10 MG: 10 TABLET ORAL at 20:32

## 2025-06-03 RX ADMIN — Medication 10 ML: at 20:33

## 2025-06-03 RX ADMIN — Medication 5 MG: at 20:32

## 2025-06-03 RX ADMIN — GUAIFENESIN 600 MG: 600 TABLET, EXTENDED RELEASE ORAL at 20:32

## 2025-06-03 RX ADMIN — METHENAMINE HIPPURATE 1 G: 1000 TABLET ORAL at 13:41

## 2025-06-03 RX ADMIN — BUDESONIDE 0.5 MG: 0.5 SUSPENSION RESPIRATORY (INHALATION) at 18:52

## 2025-06-03 RX ADMIN — SERTRALINE HYDROCHLORIDE 50 MG: 50 TABLET ORAL at 09:50

## 2025-06-03 RX ADMIN — ALLOPURINOL 300 MG: 300 TABLET ORAL at 09:51

## 2025-06-03 RX ADMIN — DOCUSATE SODIUM 50MG AND SENNOSIDES 8.6MG 2 TABLET: 8.6; 5 TABLET, FILM COATED ORAL at 09:53

## 2025-06-03 RX ADMIN — DOXYCYCLINE 100 MG: 100 CAPSULE ORAL at 09:50

## 2025-06-03 RX ADMIN — DOXYCYCLINE 100 MG: 100 CAPSULE ORAL at 20:33

## 2025-06-03 RX ADMIN — BUDESONIDE 0.5 MG: 0.5 SUSPENSION RESPIRATORY (INHALATION) at 06:56

## 2025-06-03 RX ADMIN — IPRATROPIUM BROMIDE AND ALBUTEROL SULFATE 3 ML: 2.5; .5 SOLUTION RESPIRATORY (INHALATION) at 15:59

## 2025-06-03 RX ADMIN — PANTOPRAZOLE SODIUM 40 MG: 40 TABLET, DELAYED RELEASE ORAL at 09:51

## 2025-06-03 RX ADMIN — INSULIN LISPRO 6 UNITS: 100 INJECTION, SOLUTION INTRAVENOUS; SUBCUTANEOUS at 20:32

## 2025-06-03 RX ADMIN — FUROSEMIDE 40 MG: 40 TABLET ORAL at 09:50

## 2025-06-03 RX ADMIN — METOPROLOL TARTRATE 50 MG: 50 TABLET, FILM COATED ORAL at 09:51

## 2025-06-03 RX ADMIN — ASPIRIN 81 MG: 81 TABLET, COATED ORAL at 09:51

## 2025-06-03 RX ADMIN — IPRATROPIUM BROMIDE AND ALBUTEROL SULFATE 3 ML: 2.5; .5 SOLUTION RESPIRATORY (INHALATION) at 11:55

## 2025-06-03 RX ADMIN — METHENAMINE HIPPURATE 1 G: 1000 TABLET ORAL at 20:32

## 2025-06-03 RX ADMIN — HEPARIN SODIUM 5000 UNITS: 5000 INJECTION INTRAVENOUS; SUBCUTANEOUS at 13:34

## 2025-06-03 RX ADMIN — IPRATROPIUM BROMIDE AND ALBUTEROL SULFATE 3 ML: 2.5; .5 SOLUTION RESPIRATORY (INHALATION) at 18:52

## 2025-06-03 NOTE — PLAN OF CARE
Goal Outcome Evaluation:  Plan of Care Reviewed With: patient        Progress: improving  Outcome Evaluation: Patient was able to tolerate therapeutic activities with stable oxygen levels. He is close to his baseline. Recommed cotinied IPPT and home at d/c with family assistance.    Anticipated Discharge Disposition (PT): home with assist

## 2025-06-03 NOTE — PROGRESS NOTES
Nutrition Services    Patient Name:  Darien Camarena  YOB: 1935  MRN: [id number]  Admit Date:  6/2/2025    Trigger for nutrition risk 2/2 PI. PI entered into EMR in error. Pt does not meet risk criteria at this time. Follow per protocol.     Electronically signed by:  Eboni Lyn RD  06/03/25 16:54 EDT

## 2025-06-03 NOTE — THERAPY EVALUATION
Acute Care - Occupational Therapy Discharge  [facility]    Patient Name: Darien Camarena  : 1935    MRN: [id number]                              Today's Date: 6/3/2025       Admit Date: 2025    Visit Dx:     ICD-10-CM ICD-9-CM   1. Hypoxemia  R09.02 799.02   2. COPD (chronic obstructive pulmonary disease) with acute bronchitis  J44.0 491.22    J20.9      Patient Active Problem List   Diagnosis    Type 2 diabetes mellitus with stage 3 chronic kidney disease, without long-term current use of insulin    Gastroesophageal reflux disease with esophagitis    Mixed hyperlipidemia    HTN (hypertension)    Nephrolithiasis    Obstructive sleep apnea syndrome    Permanent atrial fibrillation    CKD (chronic kidney disease) stage 3, GFR 30-59 ml/min    Coronary artery disease involving native coronary artery of native heart without angina pectoris    Acute hypoxic respiratory failure    Nodule of lower lobe of left lung    H/O: GI bleed    Presence of Watchman left atrial appendage closure device    Heart failure with mildly reduced ejection fraction (HFmrEF)    Obesity    Severe malnutrition    Acute on chronic urinary retention    Hydroureteronephrosis    Urethral stricture    Acute posterior epistaxis    Moderate protein-calorie malnutrition    Primary malignant neoplasm of left lower lobe of lung    COPD exacerbation    Rhinovirus infection    Permanent atrial fibrillation    GERD without esophagitis    Gout    Heart failure with mildly reduced ejection fraction (HFmrEF)     Past Medical History:   Diagnosis Date    Arrhythmia     Atrial fibrillation     Cataract     CHF (congestive heart failure)     Chronic kidney disease     Clotting disorder     COPD (chronic obstructive pulmonary disease)     Coronary artery disease     Dementia     Diabetes mellitus     doesnt check sugar    E. coli sepsis 2022    Enlarged prostate without lower urinary tract symptoms (luts) 2016    Full dentures     GERD  (gastroesophageal reflux disease)     Gout     Hearing aid worn     bilat prn    History of colonoscopy 09/12/2012    History of radiation therapy 02/24/2023    SBRT LLL lung    [place] (hard of hearing)     hearing aids prn    Hyperlipidemia     Hypertension     Kidney stone     surgery x1    Lung cancer     STEVEN on CPAP     compliant with machine    PAF (paroxysmal atrial fibrillation)     Pneumonia 12/22    Primary central sleep apnea     Prostatism     Urinary incontinence     Urinary tract infection     Wears glasses     readers     Past Surgical History:   Procedure Laterality Date    ATRIAL APPENDAGE EXCLUSION LEFT WITH TRANSESOPHAGEAL ECHOCARDIOGRAM N/A 11/28/2023    Procedure: Atrial Appendage Occlusion;  Surgeon: Anthony Mcdaniel MD;  Location:  [facility];  Service: Cardiovascular;  Laterality: N/A;    CARDIAC CATHETERIZATION Left 09/29/2022    Procedure: Left Heart Cath;  Surgeon: Titus Oliveros IV, MD;  Location:  [facility];  Service: Cardiovascular;  Laterality: Left;    CARDIOVERSION      CATARACT EXTRACTION Bilateral     COLONOSCOPY      CYSTOSCOPY      ENDOSCOPY      possible    ENDOSCOPY N/A 9/5/2024    Procedure: ESOPHAGOGASTRODUODENOSCOPY;  Surgeon: Anthony Arambula MD;  Location:  [facility];  Service: Gastroenterology;  Laterality: N/A;  Esophagus dilated to 18mm with 12mm-mm to 15mm, then 15mm to 18mm balloon.    ENDOSCOPY N/A 10/31/2024    Procedure: ESOPHAGOGASTRODUODENOSCOPY WITH BIOPSY;  Surgeon: Carlos Dior MD;  Location:  [facility];  Service: Gastroenterology;  Laterality: N/A;    ERCP N/A 9/5/2024    Procedure: ENDOSCOPIC RETROGRADE CHOLANGIOPANCREATOGRAPHY;  Surgeon: Anthony Arambula MD;  Location:  [facility];  Service: Gastroenterology;  Laterality: N/A;  Sphincterotomy made to ampula of common bile duct (CBD), CBD swept with 9mm-12mm balloon - sludge, stones and purulent appearing drainage extracted. 10x7 [place]  biliary stent depolyed into CBD. ERCP scope removed with balloon intact.    ERCP N/A 10/31/2024    Procedure: ENDOSCOPIC RETROGRADE CHOLANGIOPANCREATOGRAPHY;  Surgeon: Carlos Dior MD;  Location:  [facility];  Service: Gastroenterology;  Laterality: N/A;    INTERVENTIONAL RADIOLOGY PROCEDURE N/A 3/4/2025    Procedure: Coil Embolization - Epitaxis;  Surgeon: Bipin Eldridge MD;  Location:  [facility];  Service: Interventional Radiology;  Laterality: N/A;    KIDNEY STONE SURGERY      x1      General Information       Row Name 06/03/25 1102          OT Time and Intention    Document Type evaluation  -CS     Mode of Treatment occupational therapy  -CS     Patient Effort good  -CS       Row Name 06/03/25 1102          General Information    Patient Profile Reviewed yes  -CS     Prior Level of Function independent:;all household mobility;ADL's  Pt reports use of SPC/RW when needed for home mobility, shower seating in place. Dtr lives nearby and helps when needed.  -CS     Existing Precautions/Restrictions fall;other (see comments)  COPD - monitor O2  -CS     Barriers to Rehab medically complex  -CS       Row Name 06/03/25 1102          Occupational Profile    Reason for Services/Referral (Occupational Profile) discharge planning  -CS       Row Name 06/03/25 1102          Living Environment    Current Living Arrangements home;other (see comments)  Dtr lives nearby  -CS       Row Name 06/03/25 1102          Cognition    Orientation Status (Cognition) oriented x 4  -CS       Row Name 06/03/25 1102          Safety Issues/Impairments Affecting Functional Mobility    Safety Issues Affecting Function (Mobility) insight into deficits/self-awareness  -CS     Impairments Affecting Function (Mobility) balance;strength  -CS               User Key  (r) = Recorded By, (t) = Taken By, (c) = Cosigned By      Initials Name Provider Type    CS Arturo Singh OT Occupational Therapist                    Mobility/ADL's       Row Name 06/03/25 1106          Bed Mobility    Comment, (Bed Mobility) UIC upon arrival, returned to chair  -       Row Name 06/03/25 1106          Transfers    Transfers sit-stand transfer  -CS     Comment, (Transfers) STS x 7 with fading cues for UE pushthrough/reachback, MIP for 30secs with desat to 88% on RA, progressed to SBA for final stands  -       Row Name 06/03/25 1106          Sit-Stand Transfer    Sit-Stand [place] (Transfers) contact guard;verbal cues;standby assist  -     Assistive Device (Sit-Stand Transfers) walker, front-wheeled  -       Row Name 06/03/25 1106          Functional Mobility    Functional Mobility- Comment defer to PT for gait specifics  -     Patient was able to Ambulate yes  -       Row Name 06/03/25 1106          Activities of Daily Living    BADL Assessment/Intervention lower body dressing;grooming;feeding  -       Row Name 06/03/25 1106          Lower Body Dressing Assessment/Training    [place] Level (Lower Body Dressing) don;socks;contact guard assist  -CS     Position (Lower Body Dressing) edge of bed sitting  -CS     Comment, (Lower Body Dressing) CGA for stability in figure four positioning bilaterally  -CS       Row Name 06/03/25 1106          Grooming Assessment/Training    [place] Level (Grooming) grooming skills;hair care, combing/brushing;oral care regimen;wash face, hands;independent  -CS     Position (Grooming) edge of bed sitting  -CS       Row Name 06/03/25 1106          Self-Feeding Assessment/Training    [place] Level (Feeding) finger foods;prepare tray/open items;scoop food and bring to mouth;independent  -CS     Position (Feeding) supported sitting  -CS               User Key  (r) = Recorded By, (t) = Taken By, (c) = Cosigned By      Initials Name Provider Type    Arturo Boswell, OT Occupational Therapist                   Obj/Interventions       Row Name 06/03/25 1111          Sensory Assessment  "(Somatosensory)    Sensory Assessment (Somatosensory) UE sensation intact  -       Row Name 06/03/25 1111          Vision Assessment/Intervention    Visual Impairment/Limitations WFL;corrective lenses for reading  -       Row Name 06/03/25 1111          Range of Motion Comprehensive    General Range of Motion bilateral upper extremity ROM WFL  -[facility] Name 06/03/25 1111          Strength Comprehensive (MMT)    General Manual Muscle Testing (MMT) Assessment upper extremity strength deficits identified  -     Comment, General Manual Muscle Testing (MMT) Assessment BUE grossly 4+/5  -[facility] Name 06/03/25 1111          Shoulder (Therapeutic Exercise)    Shoulder (Therapeutic Exercise) AROM (active range of motion)  -     Shoulder AROM (Therapeutic Exercise) bilateral;flexion;extension;10 repetitions  -       Row Name 06/03/25 1111          Elbow/Forearm (Therapeutic Exercise)    Elbow/Forearm (Therapeutic Exercise) AROM (active range of motion)  -     Elbow/Forearm AROM (Therapeutic Exercise) bilateral;flexion;extension;3 sets;10 repetitions  -       Row Name 06/03/25 1111          Motor Skills    Motor Skills coordination;functional endurance  -     Coordination bimanual skills;WFL  -     Functional Endurance impaired, desat to 88% on RA following MIP for 30secs, standing ther-ex with \"boxing\" sets  -     Therapeutic Exercise shoulder;elbow/forearm  -       Row Name 06/03/25 1111          Balance    Balance Assessment sitting static balance;sitting dynamic balance;standing static balance;standing dynamic balance  -     Static Sitting Balance supervision  -     Dynamic Sitting Balance contact guard  -     Position, Sitting Balance unsupported;sitting in chair  -     Static Standing Balance standby assist  -     Dynamic Standing Balance contact guard  -     Position/Device Used, Standing Balance unsupported  -     Balance Interventions sitting;standing;sit to " stand;occupation based/functional task  -CS     Comment, Balance no overt LOB  -CS               User Key  (r) = Recorded By, (t) = Taken By, (c) = Cosigned By      Initials Name Provider Type    CS Arturo Singh, OT Occupational Therapist                   Goals/Plan       Row Name 06/03/25 1114          Transfer Goal 1 (OT)    Activity/Assistive Device (Transfer Goal 1, OT) bed-to-chair/chair-to-bed;toilet  -CS     [place] Level/Cues Needed (Transfer Goal 1, OT) modified independence  -CS     Time Frame (Transfer Goal 1, OT) long term goal (LTG);5 days  -CS     Progress/Outcome (Transfer Goal 1, OT) new goal  -CS       Row Name 06/03/25 1114          Dressing Goal 1 (OT)    Activity/Device (Dressing Goal 1, OT) upper body dressing;lower body dressing  -CS     [place]/Cues Needed (Dressing Goal 1, OT) set-up required  -CS     Time Frame (Dressing Goal 1, OT) long term goal (LTG);1 week  -CS     Progress/Outcome (Dressing Goal 1, OT) new goal  -CS       Row Name 06/03/25 1114          Grooming Goal 1 (OT)    Activity/Device (Grooming Goal 1, OT) hair care;oral care;wash face, hands  -CS     [place] (Grooming Goal 1, OT) supervision required  -CS     Time Frame (Grooming Goal 1, OT) long term goal (LTG);5 days  -CS     Strategies/Barriers (Grooming Goal 1, OT) sinkside standing endurance, modified w/ seated setup prn  -CS     Progress/Outcome (Grooming Goal 1, OT) new goal  -       Row Name 06/03/25 1114          Therapy Assessment/Plan (OT)    Planned Therapy Interventions (OT) activity tolerance training;functional balance retraining;occupation/activity based interventions;ROM/therapeutic exercise;strengthening exercise;transfer/mobility retraining;patient/caregiver education/training;neuromuscular control/coordination retraining;cognitive/visual perception retraining;adaptive equipment training  -CS               User Key  (r) = Recorded By, (t) = Taken By, (c) = Cosigned By      Initials Name  Provider Type    CS Arturo Singh, GERMÁN Occupational Therapist                   Clinical Impression       [facility] Name 06/03/25 1112          Pain Assessment    Additional Documentation Pain Scale: FACES Pre/Post-Treatment (Group)  -[facility] Name 06/03/25 1112          Pain Scale: FACES Pre/Post-Treatment    Pain: FACES Scale, Pretreatment 0-->no hurt  -CS     Posttreatment Pain Rating 0-->no hurt  -CS       Row Name 06/03/25 1112          Plan of Care Review    Plan of Care Reviewed With patient  -CS     Progress no change  -     Outcome Evaluation Pt presents near baseline for ADL completion with decreased functional endurance and mild/generalized strength and balance deficits. Tolerated multiple STS transfers to UE ther-ex performed in standing, desat to 88% on RA following MIP for 30 seconds. OT will follow while admitted to address aforementioned deficits, recommend d/c to home with assist.  -[facility] Name 06/03/25 1112          Therapy Assessment/Plan (OT)    Rehab Potential (OT) good  -     Criteria for Skilled Therapeutic Interventions Met (OT) yes;meets criteria;skilled treatment is necessary  -     Therapy Frequency (OT) daily  -     Predicted Duration of Therapy Intervention (OT) 7 days  -CS       Row Name 06/03/25 1112          Therapy Plan Review/Discharge Plan (OT)    Anticipated Discharge Disposition (OT) home with assist  -[facility] Name 06/03/25 1112          Vital Signs    Pre Systolic BP Rehab --  RN cleared for eval  -CS     Pre SpO2 (%) 93  -CS     O2 Delivery Pre Treatment nasal cannula  -CS     Intra SpO2 (%) 88  -CS     O2 Delivery Intra Treatment room air  -CS     Post SpO2 (%) 91  -CS     O2 Delivery Post Treatment room air  -CS     Pre Patient Position Supine  -CS     Intra Patient Position Standing  -CS     Post Patient Position Sitting  -[facility] Name 06/03/25 1112          Positioning and Restraints    Pre-Treatment Position sitting in chair/recliner  -CS     Post  Treatment Position chair  -CS     In Chair notified nsg;reclined;call light within reach;sitting;encouraged to call for assist;legs elevated;waffle cushion;exit alarm on  -CS               User Key  (r) = Recorded By, (t) = Taken By, (c) = Cosigned By      Initials Name Provider Type    CS Arturo Singh, OT Occupational Therapist                   Outcome Measures       Row Name 06/03/25 1115          How much help from another is currently needed...    Putting on and taking off regular lower body clothing? 3  -CS     Bathing (including washing, rinsing, and drying) 3  -CS     Toileting (which includes using toilet bed pan or urinal) 3  -CS     Putting on and taking off regular upper body clothing 3  -CS     Taking care of personal grooming (such as brushing teeth) 3  -CS     Eating meals 4  -CS     AM-PAC 6 Clicks Score (OT) 19  -CS       Row Name 06/03/25 0924          How much help from another person do you currently need...    Turning from your back to your side while in flat bed without using bedrails? 4  -SC     Moving from lying on back to sitting on the side of a flat bed without bedrails? 3  -SC     Moving to and from a bed to a chair (including a wheelchair)? 3  -SC     Standing up from a chair using your arms (e.g., wheelchair, bedside chair)? 3  -SC     Climbing 3-5 steps with a railing? 3  -SC     To walk in hospital room? 3  -SC     AM-PAC 6 Clicks Score (PT) 19  -SC     Highest Level of Mobility Goal Walk 10 Steps or More-6  -SC       Row Name 06/03/25 1115 06/03/25 0924       Functional Assessment    Outcome Measure Options AM-PAC 6 Clicks Daily Activity (OT)  -CS AM-PAC 6 Clicks Basic Mobility (PT)  -SC              User Key  (r) = Recorded By, (t) = Taken By, (c) = Cosigned By      Initials Name Provider Type    Case Hanna PT Physical Therapist    Arturo Boswell OT Occupational Therapist                  Occupational Therapy Education       Title: PT OT SLP Therapies (In  Progress)       Topic: Occupational Therapy (Done)       Point: ADL training (Done)       Learning Progress Summary            Patient Acceptance, E,D, NR,VU by  at 6/3/2025 1116                      Point: Home exercise program (Done)       Learning Progress Summary            Patient Acceptance, E,D, NR,VU by CS at 6/3/2025 1116                      Point: Precautions (Done)       Learning Progress Summary            Patient Acceptance, E,D, NR,VU by CS at 6/3/2025 1116                      Point: Body mechanics (Done)       Learning Progress Summary            Patient Acceptance, E,D, NR,VU by CS at 6/3/2025 1116                                      User Key       Initials Effective Dates Name Provider Type Discipline     06/16/21 -  Arturo Singh OT Occupational Therapist OT                  OT Recommendation and Plan  Planned Therapy Interventions (OT): activity tolerance training, functional balance retraining, occupation/activity based interventions, ROM/therapeutic exercise, strengthening exercise, transfer/mobility retraining, patient/caregiver education/training, neuromuscular control/coordination retraining, cognitive/visual perception retraining, adaptive equipment training  Therapy Frequency (OT): daily  Plan of Care Review  Plan of Care Reviewed With: patient  Progress: no change  Outcome Evaluation: Pt presents near baseline for ADL completion with decreased functional endurance and mild/generalized strength and balance deficits. Tolerated multiple STS transfers to UE ther-ex performed in standing, desat to 88% on RA following MIP for 30 seconds. OT will follow while admitted to address aforementioned deficits, recommend d/c to home with assist.  Plan of Care Reviewed With: patient  Outcome Evaluation: Pt presents near baseline for ADL completion with decreased functional endurance and mild/generalized strength and balance deficits. Tolerated multiple STS transfers to UE ther-ex performed in  standing, desat to 88% on RA following MIP for 30 seconds. OT will follow while admitted to address aforementioned deficits, recommend d/c to home with assist.     Time Calculation:   Evaluation Complexity (OT)  Review Occupational Profile/Medical/Therapy History Complexity: expanded/moderate complexity  Assessment, Occupational Performance/Identification of Deficit Complexity: 1-3 performance deficits  Clinical Decision Making Complexity (OT): problem focused assessment/low complexity     Time Calculation- OT       Row Name 06/03/25 1116             Time Calculation- OT    OT Start Time 1028  -CS      OT Received On 06/03/25  -CS      OT Goal Re-Cert Due Date 06/13/25  -CS         Timed Charges    23232 - OT Therapeutic Exercise Minutes 10  -CS         Untimed Charges    OT Eval/Re-eval Minutes 34  -CS         Total Minutes    Timed Charges Total Minutes 10  -CS      Untimed Charges Total Minutes 34  -CS       Total Minutes 44  -CS                User Key  (r) = Recorded By, (t) = Taken By, (c) = Cosigned By      Initials Name Provider Type    CS Arturo Singh, OT Occupational Therapist                  Therapy Charges for Today       Code Description Service Date Service Provider Modifiers Qty    [id number]  OT THER PROC EA 15 MIN 6/3/2025 Arturo Singh, OT GO 1    [id number]  OT EVAL LOW COMPLEXITY 3 6/3/2025 Arturo Singh OT GO 1               OT Discharge Summary  Anticipated Discharge Disposition (OT): home with assist    Arturo Singh OT  6/3/2025

## 2025-06-03 NOTE — PROGRESS NOTES
[facility] Medicine Services  PROGRESS NOTE    Patient Name: Darien Camarena  : 1935  MRN: [id number]    Date of Admission: 2025  Primary Care Physician: Pito Way MD    Subjective   Subjective     CC:  SOA, dizziness, cough     HPI:  Pt feeling much better today. Still requiring O2 but requirement improving.       Objective   Objective     Vital Signs:   Temp:  [97.7 °F (36.5 °C)-98.2 °F (36.8 °C)] 98.2 °F (36.8 °C)  Heart Rate:  [] 99  Resp:  [16-18] 18  BP: (123-154)/(87-98) 123/91  Flow (L/min) (Oxygen Therapy):  [1.5-3] 1.5     Physical Exam  Constitutional:       General: He is not in acute distress.  Cardiovascular:      Rate and Rhythm: Normal rate and regular rhythm.      Heart sounds: Normal heart sounds.   Pulmonary:      Effort: Pulmonary effort is normal.      Breath sounds: Normal breath sounds. No wheezing or rhonchi.   Abdominal:      General: There is no distension.      Palpations: Abdomen is soft.      Tenderness: There is no abdominal tenderness.   Musculoskeletal:      Right lower leg: No edema.      Left lower leg: No edema.   Neurological:      General: No focal deficit present.      Mental Status: He is alert.          Results Reviewed:  LAB RESULTS:      Lab 25  0700 25  1520 25  1239   WBC 4.34  --  6.65   HEMOGLOBIN 14.9  --  14.9   HEMATOCRIT 46.6  --  47.2   PLATELETS 145  --  154   NEUTROS ABS 3.64  --  5.07   IMMATURE GRANS (ABS) 0.02  --  0.04   LYMPHS ABS 0.50*  --  0.65*   MONOS ABS 0.17  --  0.67   EOS ABS 0.00  --  0.18   MCV 90.8  --  91.3   PROCALCITONIN  --   --  0.03   LACTATE  --   --  1.9   HSTROP T  --  39* 40*         Lab 25  0700 25  1239   SODIUM 137 139   POTASSIUM 3.9 4.1   CHLORIDE 97* 97*   CO2 29.0 29.0   ANION GAP 11.0 13.0   BUN 23.2* 15.6   CREATININE 1.09 1.03   EGFR 65.3 69.9   GLUCOSE 165* 110*   CALCIUM 9.3 9.2   HEMOGLOBIN A1C  --  5.70*         Lab 25  1238    TOTAL PROTEIN 7.5   ALBUMIN 3.4*   GLOBULIN 4.1   ALT (SGPT) 15   AST (SGOT) 27   BILIRUBIN 1.4*   ALK PHOS 181*         Lab 06/02/25  1520 06/02/25  1239   PROBNP  --  2,666.0*   HSTROP T 39* 40*                 Brief Urine Lab Results  (Last result in the past 365 days)        Color   Clarity   Blood   Leuk Est   Nitrite   Protein   CREAT   Urine HCG        03/12/25 1514 Yellow   Clear   Negative   Negative   Negative   Negative                   Microbiology Results Abnormal       Procedure Component Value - Date/Time    COVID PRE-OP / PRE-PROCEDURE SCREENING ORDER (NO ISOLATION) - Swab, Nasal Cavity [[id number]]  (Abnormal) Collected: 06/02/25 1259    Lab Status: Final result Specimen: Swab from Nasal Cavity Updated: 06/02/25 1417    Narrative:      The following orders were created for panel order COVID PRE-OP / PRE-PROCEDURE SCREENING ORDER (NO ISOLATION) - Swab, Nasal Cavity.  Procedure                               Abnormality         Status                     ---------                               -----------         ------                     Respiratory Panel PCR w/...[[id number]]  Abnormal            Final result                 Please view results for these tests on the individual orders.    Respiratory Panel PCR w/COVID-19(SARS-CoV-2) TACOS/MARTY/REJI/PAD/COR/BUDDY In-House, NP Swab in [facility], 2 HR TAT - Swab, Nasopharynx [[id number]]  (Abnormal) Collected: 06/02/25 1259    Lab Status: Final result Specimen: Swab from Nasopharynx Updated: 06/02/25 1417     ADENOVIRUS, PCR Not Detected     Coronavirus 229E Not Detected     Coronavirus HKU1 Not Detected     Coronavirus NL63 Not Detected     Coronavirus OC43 Not Detected     COVID19 Not Detected     Human Metapneumovirus Not Detected     Human Rhinovirus/Enterovirus Detected     Influenza A PCR Not Detected     Influenza B PCR Not Detected     Parainfluenza Virus 1 Not Detected     Parainfluenza Virus 2 Not Detected     Parainfluenza Virus 3 Not Detected      Parainfluenza Virus 4 Not Detected     RSV, PCR Not Detected     Bordetella pertussis pcr Not Detected     Bordetella parapertussis PCR Not Detected     Chlamydophila pneumoniae PCR Not Detected     Mycoplasma pneumo by PCR Not Detected    Narrative:      In the setting of a positive respiratory panel with a viral infection PLUS a negative procalcitonin without other underlying concern for bacterial infection, consider observing off antibiotics or discontinuation of antibiotics and continue supportive care. If the respiratory panel is positive for atypical bacterial infection (Bordetella pertussis, Chlamydophila pneumoniae, or Mycoplasma pneumoniae), consider antibiotic de-escalation to target atypical bacterial infection.            XR Chest 1 View  Result Date: 6/2/2025  XR CHEST 1 VW Date of Exam: 6/2/2025 1:02 PM EDT Indication: SOA triage protocol Comparison: CT chest without contrast 3/10/2025, PET/CT 3/27/2025 Findings: Heart size is normal for technique. The right lung is without consolidation. There are stable elevation of the left diaphragm with left basilar airspace disease and small left lateral effusion. Left basilar pulmonary nodule better assessed on prior chest  CT and prior PET/CT. Left atrial appendage occlusion device noted.     Impression: Impression: 1. Stable elevation of the left hemidiaphragm with left basilar airspace disease and small left pleural effusion. 2. Left basilar pulmonary nodule better assessed on recent chest CT and PET/CT. 3. Clear right lung. Electronically Signed: Gary Oliva MD  6/2/2025 1:59 PM EDT  Workstation ID: [id number]      Results for orders placed during the hospital encounter of 11/21/24    Adult Transesophageal Echo 3D (FARHAD) W/ Cont If Necessary Per Protocol    Interpretation Summary    There is a 27mm Watchman FLX device in place. It appears well seated and well compressed with no device related thrombus seen. There is a small jet of color flow, 2.5 mm, at the  superior aspect adjacent to the left upper pulmonary vein. This was not seen previous examination however image quality is improved compared to prior.    Left ventricular systolic function is moderately decreased. Left ventricular ejection fraction appears to be 36 - 40%. Normal left ventricular wall thickness noted. The left ventricular cavity is dilated. There is left ventricular global hypokinesis noted.    : The right ventricular cavity is mildly dilated. Mildly reduced right ventricular systolic function noted.    : The left atrial cavity is severely dilated. No evidence of a left atrial thrombus present. There is light spontaneous echo contrast present in the atrial body.    The right atrial cavity is severely dilated.    There is mild, bileaflet mitral valve thickening present. Mild mitral valve regurgitation is present. No significant mitral valve stenosis is present.    Mild tricuspid valve regurgitation is present. Estimated right ventricular systolic pressure from tricuspid regurgitation is mildly elevated (35-45 mmHg).    There is moderate pulmonic valve regurgitation present.      Current medications:  Scheduled Meds:allopurinol, 300 mg, Oral, Daily  aspirin, 81 mg, Oral, Daily  budesonide, 0.5 mg, Nebulization, BID - RT  digoxin, 125 mcg, Oral, Daily  donepezil, 10 mg, Oral, Nightly  doxycycline, 100 mg, Oral, Q12H  finasteride, 5 mg, Oral, Daily  furosemide, 40 mg, Oral, Daily  guaiFENesin, 600 mg, Oral, Q12H  heparin (porcine), 5,000 Units, Subcutaneous, Q8H  insulin lispro, 2-7 Units, Subcutaneous, 4x Daily AC & at Bedtime  ipratropium-albuterol, 3 mL, Nebulization, 4x Daily - RT  memantine, 10 mg, Oral, BID  methenamine, 1 g, Oral, BID With Meals  metoprolol tartrate, 50 mg, Oral, Q12H  pantoprazole, 40 mg, Oral, Q AM  pravastatin, 40 mg, Oral, Nightly  predniSONE, 40 mg, Oral, Daily With Breakfast  sertraline, 50 mg, Oral, Daily  sodium chloride, 10 mL, Intravenous, Q12H      Continuous Infusions:    PRN Meds:.  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        Brief Hospital Course to date:  Darien Camarena is a 89 y.o. male with A-fib post watchman, hypertension, hyperlipidemia, CAD, GERD, gout, left lower lung cancer, HFrEF, STEVEN, type 2 diabetes, CKD 3 who presents with shortness of breath dizziness and cough.    Acute hypoxic respiratory failure  COPD exacerbation  Rhinovirus infection  - COPD exacerbation likely secondary to rhinovirus infection  - Continue steroids, planning on 5 days of therapy  - Continue DuoNebs  - Continue Mucinex and Tessalon  - Wean oxygen as tolerated  - Continue doxy    Carcinoma left lower lung  - Presumed primary bronchogenic  - 2.2 cm left lower lobe pulmonary nodule on CT scan and PET scan  - Patient underwent course of SBRT and completed treatment in 2023  - Surveillance scan 3/2025 showed postradiation changes in the left lower lobe  and some increased nodularity but PET scan did not show recurrent disease    HFrEF  - EF 36 to 40%  - Continue Lasix    A-fib status post Watchman  - Continue digoxin and metoprolol    Type 2 diabetes  - Sliding scale insulin    Hypertension  Hyperlipidemia  CAD  - Continue aspirin and statin    CKD 3  - Baseline 1.03    GERD  - Continue omeprazole    Gout  - Continue allopurinol        Expected Discharge Location and Transportation: home  Expected Discharge   Expected discharge date/ time has not been documented.     VTE Prophylaxis:  Pharmacologic VTE prophylaxis orders are present.         AM-PAC 6 Clicks Score (PT): 19 (06/03/25 1741)    CODE STATUS:   Code Status and Medical Interventions: CPR (Attempt to Resuscitate); Full Support   Ordered at: 06/02/25 2903     Code Status (Patient has no pulse and is not breathing):    CPR (Attempt to Resuscitate)     Medical Interventions (Patient has pulse or is breathing):    Full Support     Level Of Support Discussed With:    Patient       Jazmín Tee MD  06/03/25

## 2025-06-03 NOTE — PLAN OF CARE
Goal Outcome Evaluation:  Plan of Care Reviewed With: patient        Progress: no change  Outcome Evaluation: Pt presents near baseline for ADL completion with decreased functional endurance and mild/generalized strength and balance deficits. Tolerated multiple STS transfers to UE ther-ex performed in standing, desat to 88% on RA following MIP for 30 seconds. OT will follow while admitted to address aforementioned deficits, recommend d/c to home with assist.    Anticipated Discharge Disposition (OT): home with assist

## 2025-06-03 NOTE — THERAPY EVALUATION
Patient Name: Darien Camarena  : 1935    MRN: [id number]                              Today's Date: 6/3/2025       Admit Date: 2025    Visit Dx:     ICD-10-CM ICD-9-CM   1. Hypoxemia  R09.02 799.02   2. COPD (chronic obstructive pulmonary disease) with acute bronchitis  J44.0 491.22    J20.9      Patient Active Problem List   Diagnosis    Type 2 diabetes mellitus with stage 3 chronic kidney disease, without long-term current use of insulin    Gastroesophageal reflux disease with esophagitis    Mixed hyperlipidemia    HTN (hypertension)    Nephrolithiasis    Obstructive sleep apnea syndrome    Permanent atrial fibrillation    CKD (chronic kidney disease) stage 3, GFR 30-59 ml/min    Coronary artery disease involving native coronary artery of native heart without angina pectoris    Acute hypoxic respiratory failure    Nodule of lower lobe of left lung    H/O: GI bleed    Presence of Watchman left atrial appendage closure device    Heart failure with mildly reduced ejection fraction (HFmrEF)    Obesity    Severe malnutrition    Acute on chronic urinary retention    Hydroureteronephrosis    Urethral stricture    Acute posterior epistaxis    Moderate protein-calorie malnutrition    Primary malignant neoplasm of left lower lobe of lung    COPD exacerbation    Rhinovirus infection    Permanent atrial fibrillation    GERD without esophagitis    Gout    Heart failure with mildly reduced ejection fraction (HFmrEF)     Past Medical History:   Diagnosis Date    Arrhythmia     Atrial fibrillation     Cataract     CHF (congestive heart failure)     Chronic kidney disease     Clotting disorder     COPD (chronic obstructive pulmonary disease)     Coronary artery disease     Dementia     Diabetes mellitus     doesnt check sugar    E. coli sepsis 2022    Enlarged prostate without lower urinary tract symptoms (luts) 2016    Full dentures     GERD (gastroesophageal reflux disease)     Gout     Hearing aid  worn     bilat prn    History of colonoscopy 09/12/2012    History of radiation therapy 02/24/2023    SBRT LLL lung    [place] (hard of hearing)     hearing aids prn    Hyperlipidemia     Hypertension     Kidney stone     surgery x1    Lung cancer     STEVEN on CPAP     compliant with machine    PAF (paroxysmal atrial fibrillation)     Pneumonia 12/22    Primary central sleep apnea     Prostatism     Urinary incontinence     Urinary tract infection     Wears glasses     readers     Past Surgical History:   Procedure Laterality Date    ATRIAL APPENDAGE EXCLUSION LEFT WITH TRANSESOPHAGEAL ECHOCARDIOGRAM N/A 11/28/2023    Procedure: Atrial Appendage Occlusion;  Surgeon: Anthony Mcdaniel MD;  Location:  [facility];  Service: Cardiovascular;  Laterality: N/A;    CARDIAC CATHETERIZATION Left 09/29/2022    Procedure: Left Heart Cath;  Surgeon: Titus Oliveros IV, MD;  Location:  [facility];  Service: Cardiovascular;  Laterality: Left;    CARDIOVERSION      CATARACT EXTRACTION Bilateral     COLONOSCOPY      CYSTOSCOPY      ENDOSCOPY      possible    ENDOSCOPY N/A 9/5/2024    Procedure: ESOPHAGOGASTRODUODENOSCOPY;  Surgeon: Anthony Arambula MD;  Location:  [facility];  Service: Gastroenterology;  Laterality: N/A;  Esophagus dilated to 18mm with 12mm-mm to 15mm, then 15mm to 18mm balloon.    ENDOSCOPY N/A 10/31/2024    Procedure: ESOPHAGOGASTRODUODENOSCOPY WITH BIOPSY;  Surgeon: Carlos Dior MD;  Location:  [facility];  Service: Gastroenterology;  Laterality: N/A;    ERCP N/A 9/5/2024    Procedure: ENDOSCOPIC RETROGRADE CHOLANGIOPANCREATOGRAPHY;  Surgeon: Anthony Arambula MD;  Location:  [facility];  Service: Gastroenterology;  Laterality: N/A;  Sphincterotomy made to ampula of common bile duct (CBD), CBD swept with 9mm-12mm balloon - sludge, stones and purulent appearing drainage extracted. 10x7 [place] biliary stent depolyed into CBD. ERCP scope removed with  balloon intact.    ERCP N/A 10/31/2024    Procedure: ENDOSCOPIC RETROGRADE CHOLANGIOPANCREATOGRAPHY;  Surgeon: Carlos Dior MD;  Location:  [facility];  Service: Gastroenterology;  Laterality: N/A;    INTERVENTIONAL RADIOLOGY PROCEDURE N/A 3/4/2025    Procedure: Coil Embolization - Epitaxis;  Surgeon: Bipin Eldridge MD;  Location:  [facility];  Service: Interventional Radiology;  Laterality: N/A;    KIDNEY STONE SURGERY      x1      General Information       Row Name 06/03/25 0910          Physical Therapy Time and Intention    Document Type evaluation  -SC     Mode of Treatment physical therapy  -SC       Row Name 06/03/25 0910          General Information    Patient Profile Reviewed yes  -SC     Prior Level of Function independent:;gait;transfer;ADL's;bed mobility;all household mobility  walks in the house with walker or cane  -SC     Existing Precautions/Restrictions fall;oxygen therapy device and L/min  -SC     Barriers to Rehab previous functional deficit  -SC       Row Name 06/03/25 0910          Living Environment    Current Living Arrangements home  -SC     People in Home alone;other (see comments)  daughters help  -SC       Row Name 06/03/25 0910          Home Main Entrance    Number of Stairs, Main Entrance none  -SC       Row Name 06/03/25 0910          Stairs Within Home, Primary    Number of Stairs, Within Home, Primary none  -SC       Row Name 06/03/25 0910          Cognition    Orientation Status (Cognition) oriented x 3  -SC       Row Name 06/03/25 0910          Safety Issues/Impairments Affecting Functional Mobility    Impairments Affecting Function (Mobility) balance;strength  -SC               User Key  (r) = Recorded By, (t) = Taken By, (c) = Cosigned By      Initials Name Provider Type    SC Case Macias, PT Physical Therapist                   Mobility       Row Name 06/03/25 0913          Bed Mobility    Bed Mobility scooting/bridging;supine-sit   -SC     Scooting/Bridging [place] (Bed Mobility) modified independence  -SC     Supine-Sit [place] (Bed Mobility) modified independence  -SC     Assistive Device (Bed Mobility) head of bed elevated;bed rails  -SC     Comment, (Bed Mobility) able to get up to edge of bed  -SC       Row Name 06/03/25 0913          Transfers    Comment, (Transfers) Demonstrated steady transferfs  -[facility] Name 06/03/25 0913          Sit-Stand Transfer    Sit-Stand [place] (Transfers) 1 person assist;standby assist;verbal cues  -SC     Assistive Device (Sit-Stand Transfers) walker, front-wheeled  -[facility] Name 06/03/25 0913          Gait/Stairs (Locomotion)    [place] Level (Gait) contact guard  -SC     Assistive Device (Gait) walker, front-wheeled  -SC     Distance in Feet (Gait) 60  -SC     Bilateral Gait Deviations forward flexed posture  some bilateral knee flexion in stance phase  -SC     Comment, (Gait/Stairs) Demonstrated flexed posture. He had good control of walker without LOB. No c/o SOA  -SC               User Key  (r) = Recorded By, (t) = Taken By, (c) = Cosigned By      Initials Name Provider Type    SC Case Macias, PT Physical Therapist                   Obj/Interventions       [facility] Name 06/03/25 0918          Range of Motion Comprehensive    General Range of Motion no range of motion deficits identified  -[facility] Name 06/03/25 0918          Strength Comprehensive (MMT)    General Manual Muscle Testing (MMT) Assessment lower extremity strength deficits identified  -SC     Comment, General Manual Muscle Testing (MMT) Assessment B LE grossly 4/5  -[facility] Name 06/03/25 0918          Motor Skills    Therapeutic Exercise shoulder;hip;knee  -[facility] Name 06/03/25 0918          Shoulder (Therapeutic Exercise)    Shoulder (Therapeutic Exercise) AROM (active range of motion)  -SC     Shoulder AROM (Therapeutic Exercise) bilateral;flexion;extension;aBduction;aDduction;15  repititions;sitting  -SC       Row Name 06/03/25 0918          Hip (Therapeutic Exercise)    Hip (Therapeutic Exercise) AROM (active range of motion)  -SC     Hip AROM (Therapeutic Exercise) bilateral;flexion;extension;10 repetitions;sitting  -[facility] Name 06/03/25 0918          Knee (Therapeutic Exercise)    Knee (Therapeutic Exercise) AROM (active range of motion)  -SC     Knee AROM (Therapeutic Exercise) LAQ (long arc quad);10 repetitions;bilateral;[facility]  -[facility] Name 06/03/25 0918          Balance    Balance Assessment standing dynamic balance  -SC     Position/Device Used, Standing Balance supported;walker, rolling  -SC     Comment, Balance no LOB  -[facility] Name 06/03/25 0918          Sensory Assessment (Somatosensory)    Sensory Assessment (Somatosensory) sensation intact  -SC               User Key  (r) = Recorded By, (t) = Taken By, (c) = Cosigned By      Initials Name Provider Type    SC Case Macias A, PT Physical Therapist                   Goals/Plan       [facility] Name 06/03/25 0923          Transfer Goal 1 (PT)    Activity/Assistive Device (Transfer Goal 1, PT) transfers, all;sit-to-stand/stand-to-sit;bed-to-chair/chair-to-bed  -SC     [place] Level/Cues Needed (Transfer Goal 1, PT) modified independence  -SC     Time Frame (Transfer Goal 1, PT) long term goal (LTG);10 days  -[facility] Name 06/03/25 0923          Gait Training Goal 1 (PT)    Activity/Assistive Device (Gait Training Goal 1, PT) gait (walking locomotion)  -SC     [place] Level (Gait Training Goal 1, PT) modified independence  -SC     Distance (Gait Training Goal 1, PT) 150  -SC     Time Frame (Gait Training Goal 1, PT) long term goal (LTG);10 days  -SC               User Key  (r) = Recorded By, (t) = Taken By, (c) = Cosigned By      Initials Name Provider Type    SC Case Macias, PT Physical Therapist                   Clinical Impression       [facility] Name 06/03/25 0919          Pain    Pretreatment Pain Rating  0/10 - no pain  -SC     Posttreatment Pain Rating 0/10 - no pain  -SC       Row Name 06/03/25 0919          Plan of Care Review    Plan of Care Reviewed With patient  -SC     Progress improving  -SC     Outcome Evaluation Patient was able to tolerate therapeutic activities with stable oxygen levels. He is close to his baseline. Recommed cotinied IPPT and home at d/c with family assistance.  -[facility] Name 06/03/25 0919          Therapy Assessment/Plan (PT)    Patient/Family Therapy Goals Statement (PT) go home  -SC     Criteria for Skilled Interventions Met (PT) yes;meets criteria  -SC     Therapy Frequency (PT) 2 times/day  -SC       Row Name 06/03/25 0919          Vital Signs    Pre Systolic BP Rehab 154  -SC     Pre Treatment Diastolic BP 87  -SC     Pretreatment Heart Rate (beats/min) 82  -SC     Posttreatment Heart Rate (beats/min) 88  -SC     Pre SpO2 (%) 91  -SC     Post SpO2 (%) 96  -SC     O2 Delivery Post Treatment supplemental O2  -[facility] Name 06/03/25 0919          Positioning and Restraints    Pre-Treatment Position in bed  -SC     Post Treatment Position chair  -SC     In Chair notified nsg;reclined;sitting;call light within reach;encouraged to call for assist;exit alarm on  -SC               User Key  (r) = Recorded By, (t) = Taken By, (c) = Cosigned By      Initials Name Provider Type    SC Case Macias, PT Physical Therapist                   Outcome Measures       Row Name 06/03/25 0924          How much help from another person do you currently need...    Turning from your back to your side while in flat bed without using bedrails? 4  -SC     Moving from lying on back to sitting on the side of a flat bed without bedrails? 3  -SC     Moving to and from a bed to a chair (including a wheelchair)? 3  -SC     Standing up from a chair using your arms (e.g., wheelchair, bedside chair)? 3  -SC     Climbing 3-5 steps with a railing? 3  -SC     To walk in hospital room? 3  -SC     AM-PAC 6  Clicks Score (PT) 19  -SC     Highest Level of Mobility Goal Walk 10 Steps or More-6  -SC       Row Name 06/03/25 0924          Functional Assessment    Outcome Measure Options AM-PAC 6 Clicks Basic Mobility (PT)  -SC               User Key  (r) = Recorded By, (t) = Taken By, (c) = Cosigned By      Initials Name Provider Type    SC Case Macias PT Physical Therapist                                 Physical Therapy Education       Title: PT OT SLP Therapies (Done)       Topic: Physical Therapy (Done)       Point: Mobility training (Done)       Learning Progress Summary            Patient Eager, E, VU by SC at 6/3/2025 0925    Comment: reviewed HEP                      Point: Home exercise program (Done)       Learning Progress Summary            Patient Eager, E, VU by SC at 6/3/2025 0925    Comment: reviewed HEP                      Point: Body mechanics (Done)       Learning Progress Summary            Patient Eager, E, VU by SC at 6/3/2025 0925    Comment: reviewed HEP                      Point: Precautions (Done)       Learning Progress Summary            Patient Eager, E, VU by SC at 6/3/2025 0925    Comment: reviewed HEP                                      User Key       Initials Effective Dates Name Provider Type Discipline    SC 02/03/23 -  Case Macias PT Physical Therapist PT                  PT Recommendation and Plan     Progress: improving  Outcome Evaluation: Patient was able to tolerate therapeutic activities with stable oxygen levels. He is close to his baseline. Recommed cotinied IPPT and home at d/c with family assistance.     Time Calculation:   PT Evaluation Complexity  History, PT Evaluation Complexity: 3 or more personal factors and/or comorbidities  Examination of Body Systems (PT Eval Complexity): total of 4 or more elements  Clinical Presentation (PT Evaluation Complexity): evolving  Clinical Decision Making (PT Evaluation Complexity): moderate complexity  Overall Complexity (PT  Evaluation Complexity): moderate complexity     PT Charges       Row Name 06/03/25 0839             Time Calculation    Start Time 0839  -SC      PT Received On 06/03/25  -SC      PT Goal Re-Cert Due Date 06/13/25  -SC         Untimed Charges    PT Eval/Re-eval Minutes 51  -SC         Total Minutes    Untimed Charges Total Minutes 51  -SC       Total Minutes 51  -SC                User Key  (r) = Recorded By, (t) = Taken By, (c) = Cosigned By      Initials Name Provider Type    SC Case Macias PT Physical Therapist                  Therapy Charges for Today       Code Description Service Date Service Provider Modifiers Qty    [id number] HC PT EVAL MOD COMPLEXITY 4 6/3/2025 Case Macias PT GP 1            PT G-Codes  Outcome Measure Options: AM-PAC 6 Clicks Basic Mobility (PT)  AM-PAC 6 Clicks Score (PT): 19  PT Discharge Summary  Anticipated Discharge Disposition (PT): home with assist    Case Macias, PT  6/3/2025

## 2025-06-03 NOTE — CASE MANAGEMENT/SOCIAL WORK
Continued Stay Note  [facility]     Patient Name: Darien Camarena  MRN: [id number]  Today's Date: 6/3/2025    Admit Date: 6/2/2025    Plan: Home with family assistance   Discharge Plan       Row Name 06/03/25 1446       Plan    Plan Home with family assistance    Patient/Family in Agreement with Plan yes    Plan Comments Met with Mr. Camarena, at the bedside, for discharge planning.    Mr. Camarena lives alone, in [place].    He states that he has a daughter that lives in [place], and one in [place], that can assist him at home. as needed.  He does not drive.    Prior to admission, the patient ambulated with a qd cane or with a rollator.  He also has a built in shower chair.  He denies any additional DME needs.    One step to enter his home in the front, and 2 steps in the back.    He has been evaluated by PT and OT and recommendation is home with assist, as patient is near baseline function.  The patient states he is not interested in any HH, or outpatient services.   PCP is [address].  Insurance is [facility] Medicare with prescription drug coverage.  All 3 of Mr. Camarena's daughters are POA's.    DC plan is home with family assistance.  Mr. Camarena's daughter can transport him home when ready for discharge.    CM will continue to follow.    Final Discharge Disposition Code 01 - home or self-care                  Jeannine Rocha RN

## 2025-06-04 ENCOUNTER — READMISSION MANAGEMENT (OUTPATIENT)
Dept: CALL CENTER | Facility: HOSPITAL | Age: 89
End: 2025-06-04
Payer: MEDICARE

## 2025-06-04 VITALS
OXYGEN SATURATION: 96 % | DIASTOLIC BLOOD PRESSURE: 96 MMHG | HEART RATE: 91 BPM | TEMPERATURE: 97.7 F | WEIGHT: 226.4 LBS | HEIGHT: 75 IN | BODY MASS INDEX: 28.15 KG/M2 | RESPIRATION RATE: 18 BRPM | SYSTOLIC BLOOD PRESSURE: 125 MMHG

## 2025-06-04 LAB
GLUCOSE BLDC GLUCOMTR-MCNC: 153 MG/DL (ref 70–130)
GLUCOSE BLDC GLUCOMTR-MCNC: 171 MG/DL (ref 70–130)

## 2025-06-04 PROCEDURE — 25010000002 HEPARIN (PORCINE) PER 1000 UNITS: Performed by: NURSE PRACTITIONER

## 2025-06-04 PROCEDURE — 82948 REAGENT STRIP/BLOOD GLUCOSE: CPT

## 2025-06-04 PROCEDURE — 94664 DEMO&/EVAL PT USE INHALER: CPT

## 2025-06-04 PROCEDURE — 63710000001 INSULIN LISPRO (HUMAN) PER 5 UNITS: Performed by: NURSE PRACTITIONER

## 2025-06-04 PROCEDURE — 63710000001 PREDNISONE PER 1 MG: Performed by: NURSE PRACTITIONER

## 2025-06-04 PROCEDURE — 99239 HOSP IP/OBS DSCHRG MGMT >30: CPT | Performed by: STUDENT IN AN ORGANIZED HEALTH CARE EDUCATION/TRAINING PROGRAM

## 2025-06-04 PROCEDURE — 94799 UNLISTED PULMONARY SVC/PX: CPT

## 2025-06-04 RX ORDER — DOXYCYCLINE 100 MG/1
100 CAPSULE ORAL EVERY 12 HOURS SCHEDULED
Qty: 7 CAPSULE | Refills: 0 | Status: SHIPPED | OUTPATIENT
Start: 2025-06-04 | End: 2025-06-08

## 2025-06-04 RX ORDER — PREDNISONE 20 MG/1
40 TABLET ORAL
Qty: 8 TABLET | Refills: 0 | Status: SHIPPED | OUTPATIENT
Start: 2025-06-04 | End: 2025-06-08

## 2025-06-04 RX ORDER — GUAIFENESIN 600 MG/1
600 TABLET, EXTENDED RELEASE ORAL EVERY 12 HOURS SCHEDULED
Qty: 6 TABLET | Refills: 0 | Status: SHIPPED | OUTPATIENT
Start: 2025-06-04 | End: 2025-06-16 | Stop reason: SDUPTHER

## 2025-06-04 RX ADMIN — METHENAMINE HIPPURATE 1 G: 1000 TABLET ORAL at 09:14

## 2025-06-04 RX ADMIN — MEMANTINE 10 MG: 10 TABLET ORAL at 09:14

## 2025-06-04 RX ADMIN — Medication 10 ML: at 09:14

## 2025-06-04 RX ADMIN — INSULIN LISPRO 2 UNITS: 100 INJECTION, SOLUTION INTRAVENOUS; SUBCUTANEOUS at 12:14

## 2025-06-04 RX ADMIN — DIGOXIN 125 MCG: 125 TABLET ORAL at 09:14

## 2025-06-04 RX ADMIN — ALLOPURINOL 300 MG: 300 TABLET ORAL at 09:14

## 2025-06-04 RX ADMIN — IPRATROPIUM BROMIDE AND ALBUTEROL SULFATE 3 ML: 2.5; .5 SOLUTION RESPIRATORY (INHALATION) at 10:55

## 2025-06-04 RX ADMIN — METOPROLOL TARTRATE 50 MG: 50 TABLET, FILM COATED ORAL at 09:14

## 2025-06-04 RX ADMIN — SERTRALINE HYDROCHLORIDE 50 MG: 50 TABLET ORAL at 09:13

## 2025-06-04 RX ADMIN — FINASTERIDE 5 MG: 5 TABLET, FILM COATED ORAL at 09:14

## 2025-06-04 RX ADMIN — PREDNISONE 40 MG: 20 TABLET ORAL at 09:14

## 2025-06-04 RX ADMIN — ASPIRIN 81 MG: 81 TABLET, COATED ORAL at 09:14

## 2025-06-04 RX ADMIN — INSULIN LISPRO 2 UNITS: 100 INJECTION, SOLUTION INTRAVENOUS; SUBCUTANEOUS at 09:13

## 2025-06-04 RX ADMIN — FUROSEMIDE 40 MG: 40 TABLET ORAL at 09:13

## 2025-06-04 RX ADMIN — DOXYCYCLINE 100 MG: 100 CAPSULE ORAL at 09:13

## 2025-06-04 RX ADMIN — HEPARIN SODIUM 5000 UNITS: 5000 INJECTION INTRAVENOUS; SUBCUTANEOUS at 05:19

## 2025-06-04 RX ADMIN — GUAIFENESIN 600 MG: 600 TABLET, EXTENDED RELEASE ORAL at 09:14

## 2025-06-04 RX ADMIN — BUDESONIDE 0.5 MG: 0.5 SUSPENSION RESPIRATORY (INHALATION) at 07:24

## 2025-06-04 RX ADMIN — PANTOPRAZOLE SODIUM 40 MG: 40 TABLET, DELAYED RELEASE ORAL at 05:19

## 2025-06-04 RX ADMIN — IPRATROPIUM BROMIDE AND ALBUTEROL SULFATE 3 ML: 2.5; .5 SOLUTION RESPIRATORY (INHALATION) at 07:24

## 2025-06-04 NOTE — CASE MANAGEMENT/SOCIAL WORK
Continued Stay Note   [facility]     Patient Name: Darien Camarena  MRN: [id number]  Today's Date: 6/4/2025    Admit Date: 6/2/2025    Plan: Home with family assistance   Discharge Plan       Row Name 06/04/25 1303       Plan    Plan Home with family assistance    Patient/Family in Agreement with Plan yes    Plan Comments CM spoke with patient at bedside regarding Dc planning. DC plan is home with family assistance. Patient's daughter will provide transportation to home when medically ready.    Final Discharge Disposition Code 01 - home or self-care                   Discharge Codes    No documentation.                 Expected Discharge Date and Time       Expected Discharge Date Expected Discharge Time    Jun 4, 2025               Alize Jones RN

## 2025-06-04 NOTE — PLAN OF CARE
Goal Outcome Evaluation:               Patient alert and oriented x3. Del Cid drainage bag changed to leg bag per patient request for discharge. Discharge instructions given to patient and daughter at bedside. Patient and daughter states understanding. Daughter transporting patient home in personal car.

## 2025-06-04 NOTE — OUTREACH NOTE
Prep Survey      Flowsheet Row Responses   [facility] patient discharged from? [place]   Is LACE score < 7 ? No   Eligibility [facility]   Date of Admission 06/02/25   Date of Discharge 06/04/25   Discharge Disposition Home or Self Care   Discharge diagnosis COPD exacerbation (   Does the patient have one of the following disease processes/diagnoses(primary or secondary)? COPD   Does the patient have Home health ordered? No   Is there a DME ordered? No   Prep survey completed? Yes            ISAAC GARCIA - Registered Nurse

## 2025-06-04 NOTE — DISCHARGE SUMMARY
[facility] Medicine Services  DISCHARGE SUMMARY    Patient Name: Darien Camarena  : 1935  MRN: [id number]    Date of Admission: 2025  2:06 PM  Date of Discharge:  25  Primary Care Physician: Pito Way MD    Consults       No orders found from 2025 to 6/3/2025.            Hospital Course     Presenting Problem: SOA    Active Hospital Problems    Diagnosis  POA    **COPD exacerbation [J44.1]  Yes    Rhinovirus infection [B34.8]  Unknown    Permanent atrial fibrillation [I48.21]  Unknown    GERD without esophagitis [K21.9]  Unknown    Gout [M10.9]  Unknown    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]  Unknown    Primary malignant neoplasm of left lower lobe of lung [C34.32]  Yes    Acute hypoxic respiratory failure [J96.01]  Yes    Coronary artery disease involving native coronary artery of native heart without angina pectoris [I25.10]  Yes    CKD (chronic kidney disease) stage 3, GFR 30-59 ml/min [N18.30]  Yes    HTN (hypertension) [I10]  Yes    Mixed hyperlipidemia [E78.2]  Yes      Resolved Hospital Problems   No resolved problems to display.          Hospital Course:  Darien Camarena is a 89 y.o. male with A-fib post watchman, hypertension, hyperlipidemia, CAD, GERD, gout, left lower lobe lung cancer, HFrEF, STEVEN, type 2 diabetes, COPD, CKD 3 who presents for shortness of breath and dizziness with cough.    Acute hypoxic respiratory failure-resolved  COPD exacerbation  Rhinovirus  -Patient treated with steroids DuoNebs and Mucinex with significant improvement in his symptoms  - Discharged on prednisone and doxycycline to complete 5 days of therapy for COPD exacerbation  - Patient weaned off oxygen  - Reports he is breathing back at baseline  - Resume home nebulizers/inhalers  - Ambulating around his room without difficulty      Discharge Follow Up Recommendations for outpatient labs/diagnostics:  PCP follow-up 1 week    Day  of Discharge     HPI:   Patient feeling very well this morning.  He is off oxygen.  He is ambulating around the room without difficulty.  He says he is feels back to baseline.    Review of Systems   Respiratory:  Negative for cough and shortness of breath.    Cardiovascular:  Negative for chest pain and leg swelling.   Gastrointestinal:  Negative for constipation, diarrhea, nausea and vomiting.         Vital Signs:   Temp:  [97.7 °F (36.5 °C)-98.5 °F (36.9 °C)] 97.7 °F (36.5 °C)  Heart Rate:  [] 91  Resp:  [16-18] 18  BP: (122-154)/() 125/96      Physical Exam  Constitutional:       General: He is not in acute distress.  Cardiovascular:      Rate and Rhythm: Normal rate and regular rhythm.      Heart sounds: Normal heart sounds.   Pulmonary:      Effort: Pulmonary effort is normal.      Breath sounds: Normal breath sounds.   Abdominal:      General: There is no distension.      Palpations: Abdomen is soft.      Tenderness: There is no abdominal tenderness.   Musculoskeletal:      Right lower leg: No edema.      Left lower leg: No edema.   Neurological:      General: No focal deficit present.      Mental Status: He is alert.          Pertinent  and/or Most Recent Results     LAB RESULTS:      Lab 06/03/25  0700 06/02/25  1239   WBC 4.34 6.65   HEMOGLOBIN 14.9 14.9   HEMATOCRIT 46.6 47.2   PLATELETS 145 154   NEUTROS ABS 3.64 5.07   IMMATURE GRANS (ABS) 0.02 0.04   LYMPHS ABS 0.50* 0.65*   MONOS ABS 0.17 0.67   EOS ABS 0.00 0.18   MCV 90.8 91.3   PROCALCITONIN  --  0.03   LACTATE  --  1.9         Lab 06/03/25  0700 06/02/25  1239   SODIUM 137 139   POTASSIUM 3.9 4.1   CHLORIDE 97* 97*   CO2 29.0 29.0   ANION GAP 11.0 13.0   BUN 23.2* 15.6   CREATININE 1.09 1.03   EGFR 65.3 69.9   GLUCOSE 165* 110*   CALCIUM 9.3 9.2   HEMOGLOBIN A1C  --  5.70*         Lab 06/02/25  1239   TOTAL PROTEIN 7.5   ALBUMIN 3.4*   GLOBULIN 4.1   ALT (SGPT) 15   AST (SGOT) 27   BILIRUBIN 1.4*   ALK PHOS 181*         Lab  06/02/25  1520 06/02/25  1239   PROBNP  --  2,666.0*   HSTROP T 39* 40*                 Brief Urine Lab Results  (Last result in the past 365 days)        Color   Clarity   Blood   Leuk Est   Nitrite   Protein   CREAT   Urine HCG        03/12/25 1514 Yellow   Clear   Negative   Negative   Negative   Negative                 Microbiology Results (last 10 days)       Procedure Component Value - Date/Time    COVID PRE-OP / PRE-PROCEDURE SCREENING ORDER (NO ISOLATION) - Swab, Nasal Cavity [[id number]]  (Abnormal) Collected: 06/02/25 1259    Lab Status: Final result Specimen: Swab from Nasal Cavity Updated: 06/02/25 1417    Narrative:      The following orders were created for panel order COVID PRE-OP / PRE-PROCEDURE SCREENING ORDER (NO ISOLATION) - Swab, Nasal Cavity.  Procedure                               Abnormality         Status                     ---------                               -----------         ------                     Respiratory Panel PCR w/...[[id number]]  Abnormal            Final result                 Please view results for these tests on the individual orders.    Respiratory Panel PCR w/COVID-19(SARS-CoV-2) TACOS/MARTY/REJI/PAD/COR/BUDDY In-House, NP Swab in UTM/VTM, 2 HR TAT - Swab, Nasopharynx [[id number]]  (Abnormal) Collected: 06/02/25 1259    Lab Status: Final result Specimen: Swab from Nasopharynx Updated: 06/02/25 1417     ADENOVIRUS, PCR Not Detected     Coronavirus 229E Not Detected     Coronavirus HKU1 Not Detected     Coronavirus NL63 Not Detected     Coronavirus OC43 Not Detected     COVID19 Not Detected     Human Metapneumovirus Not Detected     Human Rhinovirus/Enterovirus Detected     Influenza A PCR Not Detected     Influenza B PCR Not Detected     Parainfluenza Virus 1 Not Detected     Parainfluenza Virus 2 Not Detected     Parainfluenza Virus 3 Not Detected     Parainfluenza Virus 4 Not Detected     RSV, PCR Not Detected     Bordetella pertussis pcr Not Detected     Bordetella  parapertussis PCR Not Detected     Chlamydophila pneumoniae PCR Not Detected     Mycoplasma pneumo by PCR Not Detected    Narrative:      In the setting of a positive respiratory panel with a viral infection PLUS a negative procalcitonin without other underlying concern for bacterial infection, consider observing off antibiotics or discontinuation of antibiotics and continue supportive care. If the respiratory panel is positive for atypical bacterial infection (Bordetella pertussis, Chlamydophila pneumoniae, or Mycoplasma pneumoniae), consider antibiotic de-escalation to target atypical bacterial infection.            XR Chest 1 View  Result Date: 6/2/2025  XR CHEST 1 VW Date of Exam: 6/2/2025 1:02 PM EDT Indication: SOA triage protocol Comparison: CT chest without contrast 3/10/2025, PET/CT 3/27/2025 Findings: Heart size is normal for technique. The right lung is without consolidation. There are stable elevation of the left diaphragm with left basilar airspace disease and small left lateral effusion. Left basilar pulmonary nodule better assessed on prior chest  CT and prior PET/CT. Left atrial appendage occlusion device noted.     Impression: 1. Stable elevation of the left hemidiaphragm with left basilar airspace disease and small left pleural effusion. 2. Left basilar pulmonary nodule better assessed on recent chest CT and PET/CT. 3. Clear right lung. Electronically Signed: Gary Oliva MD  6/2/2025 1:59 PM EDT  Workstation ID: [id number]              Results for orders placed during the hospital encounter of 11/21/24    Adult Transesophageal Echo 3D (FARHAD) W/ Cont If Necessary Per Protocol    Interpretation Summary    There is a 27mm Watchman FLX device in place. It appears well seated and well compressed with no device related thrombus seen. There is a small jet of color flow, 2.5 mm, at the superior aspect adjacent to the left upper pulmonary vein. This was not seen previous examination however image quality is  improved compared to prior.    Left ventricular systolic function is moderately decreased. Left ventricular ejection fraction appears to be 36 - 40%. Normal left ventricular wall thickness noted. The left ventricular cavity is dilated. There is left ventricular global hypokinesis noted.    : The right ventricular cavity is mildly dilated. Mildly reduced right ventricular systolic function noted.    : The left atrial cavity is severely dilated. No evidence of a left atrial thrombus present. There is light spontaneous echo contrast present in the atrial body.    The right atrial cavity is severely dilated.    There is mild, bileaflet mitral valve thickening present. Mild mitral valve regurgitation is present. No significant mitral valve stenosis is present.    Mild tricuspid valve regurgitation is present. Estimated right ventricular systolic pressure from tricuspid regurgitation is mildly elevated (35-45 mmHg).    There is moderate pulmonic valve regurgitation present.      Plan for Follow-up of Pending Labs/Results:     Discharge Details        Discharge Medications        New Medications        Instructions Start Date   doxycycline 100 MG capsule  Commonly known as: MONODOX   100 mg, Oral, Every 12 Hours Scheduled      Mucus Relief 600 MG 12 hr tablet  Generic drug: guaiFENesin   600 mg, Oral, Every 12 Hours Scheduled      predniSONE 20 MG tablet  Commonly known as: DELTASONE   40 mg, Oral, Daily With Breakfast             Continue These Medications        Instructions Start Date   albuterol sulfate  (90 Base) MCG/ACT inhaler  Commonly known as: PROVENTIL HFA;VENTOLIN HFA;PROAIR HFA   2 puffs, Inhalation, Every 4 Hours PRN      allopurinol 300 MG tablet  Commonly known as: ZYLOPRIM   300 mg, Oral, Daily      ascorbic acid 1000 MG tablet  Commonly known as: VITAMIN C   1,000 mg, Oral, Daily      aspirin 81 MG EC tablet   81 mg, Oral, Daily      Coenzyme Q10 300 MG capsule   1 capsule, Nightly      digoxin 125  MCG tablet  Commonly known as: LANOXIN   125 mcg, Oral, Daily      docusate sodium 100 MG capsule  Commonly known as: COLACE   200 mg, Oral, Daily      donepezil 10 MG tablet  Commonly known as: ARICEPT   10 mg, Oral, Nightly      finasteride 5 MG tablet  Commonly known as: PROSCAR   5 mg, Oral, Every Night at Bedtime      furosemide 40 MG tablet  Commonly known as: LASIX   40 mg, Oral, Daily      melatonin 5 MG tablet tablet   5 mg, Oral, Nightly PRN      memantine 10 MG tablet  Commonly known as: NAMENDA   10 mg, Oral, 2 Times Daily      metFORMIN 1000 MG tablet  Commonly known as: GLUCOPHAGE   500 mg, Oral, 2 Times Daily With Meals      methenamine 1 g tablet  Commonly known as: HIPREX   1 g, Oral, 2 Times Daily With Meals      metoprolol tartrate 50 MG tablet  Commonly known as: LOPRESSOR   50 mg, Oral, Every 12 Hours Scheduled      omeprazole 20 MG capsule  Commonly known as: priLOSEC   40 mg, Every Morning      pravastatin 40 MG tablet  Commonly known as: PRAVACHOL   40 mg, Oral, Nightly      PROBIOTIC ADVANCED PO   1 tablet, 2 Times Daily      sertraline 50 MG tablet  Commonly known as: ZOLOFT   50 mg, Oral, Daily      tiotropium bromide-olodaterol 2.5-2.5 MCG/ACT aerosol solution inhaler  Commonly known as: STIOLTO RESPIMAT   2 puffs, Inhalation, Daily, 2 inh once a day               Allergies   Allergen Reactions    Sglt2 Inhibitors Other (See Comments)     Recurrent UTI    Xarelto [Rivaroxaban] GI Bleeding     GI bleed    Penicillins Hives     Has tolerated cefepime, ceftriaxone, cefazolin, cefdinir, cefuroxime, cephalexin         Discharge Disposition:  Home or Self Care    Diet:  Hospital:  Diet Order   Procedures    Diet: Regular/House; Fluid Consistency: Thin (IDDSI 0)            Activity:  As tolerated    Restrictions or Other Recommendations:  None       CODE STATUS:    Code Status and Medical Interventions: CPR (Attempt to Resuscitate); Full Support   Ordered at: 06/02/25 2275     Code Status  (Patient has no pulse and is not breathing):    CPR (Attempt to Resuscitate)     Medical Interventions (Patient has pulse or is breathing):    Full Support     Level Of Support Discussed With:    Patient       Future Appointments   Date Time Provider Department Center   6/9/2025  3:15 PM Haja Henry APRN [facility]   6/12/2025  3:15 PM Channing Sun MD [facility]   6/26/2025 11:30 AM [facility] CT 2  [facility] CT None   6/26/2025  2:00 PM Abbe Leary MD [facility] None   8/5/2025  2:00 PM Pito Way MD [facility]   10/2/2025 11:30 AM Charmaine Marte APRN [facility]   10/9/2025 11:00 AM Anthony Mcdaniel MD [facility]   6/23/2026  3:45 PM Titus Oliveros IV, MD [facility]                 Jazmín Tee MD  06/04/25      Time Spent on Discharge:  I spent  35  minutes on this discharge activity which included: face-to-face encounter with the patient, reviewing the data in the system, coordination of the care with the nursing staff as well as consultants, documentation, and entering orders.

## 2025-06-05 ENCOUNTER — TRANSITIONAL CARE MANAGEMENT TELEPHONE ENCOUNTER (OUTPATIENT)
Dept: CALL CENTER | Facility: HOSPITAL | Age: 89
End: 2025-06-05
Payer: MEDICARE

## 2025-06-05 NOTE — OUTREACH NOTE
Call Center TCM Note      Flowsheet Row Responses   [facility] patient discharged from? [place]   Does the patient have one of the following disease processes/diagnoses(primary or secondary)? COPD   TCM attempt successful? Yes  [pt needs updated verbal for PCP office--noted that dtr active with POC during admission]   Call start time 1454   Unsuccessful attempts Attempt 1   Call end time 1459   Discharge diagnosis COPD exacerbation   Person spoke with today (if not patient) and relationship logan Jordan POA   Meds reviewed with patient/caregiver? Yes   Is the patient having any side effects they believe may be caused by any medication additions or changes? No   Does the patient have all medications ordered at discharge? Yes   Is the patient taking all medications as directed (includes completed medication regime)? Yes   Comments Hospital f/u 6/9/25@200 (appt in place at time of call)   Does the patient have an appointment with their PCP within 7-14 days of discharge? Yes   Nursing Interventions Confirmed date/time of appointment   Psychosocial issues? No   Did the patient receive a copy of their discharge instructions? Yes   Nursing interventions Reviewed instructions with patient   What is the patient's perception of their health status since discharge? Improving  [dtr reports pt is doing good.  Reports she has all his new meds set up for him to take. Reports he knows how to use MDI rescue inhaler prn for SOA and actions to take if feels worse (calls her first).  Dtr has no questions today.]   Nursing Interventions Nurse provided patient education   Is the patient/caregiver able to teach back the hierarchy of who to call/visit for symptoms/problems? PCP, Specialist, Home health nurse, Urgent Care, ED, 911 Yes   TCM call completed? Yes   Call end time 1459            MODESTO BANERJEE - Registered Nurse    6/5/2025, 15:01 EDT

## 2025-06-09 ENCOUNTER — HOSPITAL ENCOUNTER (OUTPATIENT)
Dept: GENERAL RADIOLOGY | Facility: HOSPITAL | Age: 89
Discharge: HOME | End: 2025-06-09
Admitting: INTERNAL MEDICINE
Payer: MEDICARE

## 2025-06-09 ENCOUNTER — OFFICE VISIT (OUTPATIENT)
Dept: INTERNAL MEDICINE | Facility: CLINIC | Age: 89
End: 2025-06-09
Payer: MEDICARE

## 2025-06-09 VITALS
HEART RATE: 88 BPM | WEIGHT: 210.2 LBS | RESPIRATION RATE: 16 BRPM | DIASTOLIC BLOOD PRESSURE: 78 MMHG | TEMPERATURE: 96.4 F | BODY MASS INDEX: 26.27 KG/M2 | SYSTOLIC BLOOD PRESSURE: 112 MMHG

## 2025-06-09 DIAGNOSIS — R41.3 MEMORY LOSS: ICD-10-CM

## 2025-06-09 DIAGNOSIS — F32.9 REACTIVE DEPRESSION: ICD-10-CM

## 2025-06-09 DIAGNOSIS — R33.9 ACUTE ON CHRONIC URINARY RETENTION: ICD-10-CM

## 2025-06-09 DIAGNOSIS — R05.2 SUBACUTE COUGH: ICD-10-CM

## 2025-06-09 DIAGNOSIS — G31.84 MILD COGNITIVE IMPAIRMENT: ICD-10-CM

## 2025-06-09 DIAGNOSIS — M1A.9XX0 CHRONIC GOUT WITHOUT TOPHUS, UNSPECIFIED CAUSE, UNSPECIFIED SITE: ICD-10-CM

## 2025-06-09 DIAGNOSIS — R05.2 SUBACUTE COUGH: Primary | ICD-10-CM

## 2025-06-09 PROCEDURE — 1126F AMNT PAIN NOTED NONE PRSNT: CPT | Performed by: INTERNAL MEDICINE

## 2025-06-09 PROCEDURE — 71046 X-RAY EXAM CHEST 2 VIEWS: CPT

## 2025-06-09 PROCEDURE — 1111F DSCHRG MED/CURRENT MED MERGE: CPT | Performed by: INTERNAL MEDICINE

## 2025-06-09 PROCEDURE — 99495 TRANSJ CARE MGMT MOD F2F 14D: CPT | Performed by: INTERNAL MEDICINE

## 2025-06-09 RX ORDER — BENZONATATE 200 MG/1
200 CAPSULE ORAL 3 TIMES DAILY PRN
Qty: 30 CAPSULE | Refills: 0 | Status: SHIPPED | OUTPATIENT
Start: 2025-06-09

## 2025-06-09 RX ORDER — ALLOPURINOL 300 MG/1
300 TABLET ORAL DAILY
Qty: 90 TABLET | Refills: 3 | Status: SHIPPED | OUTPATIENT
Start: 2025-06-09

## 2025-06-09 RX ORDER — FINASTERIDE 5 MG/1
5 TABLET, FILM COATED ORAL
Qty: 90 TABLET | Refills: 3 | Status: SHIPPED | OUTPATIENT
Start: 2025-06-09

## 2025-06-09 RX ORDER — PREDNISONE 10 MG/1
TABLET ORAL
Qty: 37 TABLET | Refills: 0 | Status: SHIPPED | OUTPATIENT
Start: 2025-06-09

## 2025-06-09 RX ORDER — DONEPEZIL HYDROCHLORIDE 10 MG/1
10 TABLET, FILM COATED ORAL
Qty: 90 TABLET | Refills: 3 | Status: SHIPPED | OUTPATIENT
Start: 2025-06-09

## 2025-06-09 RX ORDER — MEMANTINE HYDROCHLORIDE 10 MG/1
10 TABLET ORAL 2 TIMES DAILY
Qty: 180 TABLET | Refills: 3 | Status: SHIPPED | OUTPATIENT
Start: 2025-06-09

## 2025-06-09 RX ORDER — ALBUTEROL SULFATE 90 UG/1
INHALANT RESPIRATORY (INHALATION)
Qty: 51 G | Refills: 3 | Status: SHIPPED | OUTPATIENT
Start: 2025-06-09

## 2025-06-09 NOTE — PROGRESS NOTES
Chief Complaint   Patient presents with    Hospital Follow Up Visit     Admitted:06/02/2025  Discharged: 06/04/2025  Hospital: [facility]  Reason: exacerbated COPD/rhinovirus       History of Present Illness   The patient presents as a hospital follow-up from 6/02-6/04 with COPD exacerbation and rhinovirus.   Patient was treated with steroids DuoNebs and Mucinex with significant improvement in his symptoms. He was discharged on prednisone and doxycycline to complete 5 days of therapy for COPD exacerbation. He reports today he is feeling much better, he still complains of a productive cough with pale yellow sputum. He denies SOB, rhinorrhea, congestion, fatigue, headache, sneezing, sore throat, and sinus pressure.     Medications      Current Outpatient Medications:     albuterol sulfate  (90 Base) MCG/ACT inhaler, USE 2 INHALATIONS BY MOUTH EVERY 4 HOURS AS NEEDED FOR WHEEZING, Disp: 51 g, Rfl: 3    allopurinol (ZYLOPRIM) 300 MG tablet, TAKE 1 TABLET BY MOUTH DAILY, Disp: 90 tablet, Rfl: 3    ascorbic acid (VITAMIN C) 1000 MG tablet, Take 1 tablet by mouth Daily. (Patient taking differently: Take 1 tablet by mouth 2 (Two) Times a Day. OTC), Disp: , Rfl:     aspirin 81 MG EC tablet, Take 1 tablet by mouth Daily. (Patient taking differently: Take 1 tablet by mouth Daily. OTC), Disp: , Rfl:     Coenzyme Q10 300 MG capsule, Take 1 capsule by mouth Every Night. OTC, Disp: , Rfl:     digoxin (LANOXIN) 125 MCG tablet, Take 1 tablet by mouth Daily., Disp: 30 tablet, Rfl: 11    docusate sodium (COLACE) 100 MG capsule, Take 2 capsules by mouth Daily. (Patient taking differently: Take 2 capsules by mouth Daily As Needed for Constipation. OTC), Disp: , Rfl:     donepezil (ARICEPT) 10 MG tablet, TAKE 1 TABLET BY MOUTH AT NIGHT, Disp: 90 tablet, Rfl: 3    finasteride (PROSCAR) 5 MG tablet, TAKE 1 TABLET BY MOUTH EVERY  NIGHT AT BEDTIME, Disp: 90 tablet, Rfl: 3    furosemide (LASIX) 40 MG tablet, Take 1 tablet by mouth Daily.,  Disp: 90 tablet, Rfl: 1    guaiFENesin (MUCINEX) 600 MG 12 hr tablet, Take 1 tablet by mouth Every 12 (Twelve) Hours., Disp: 6 tablet, Rfl: 0    melatonin 5 MG tablet tablet, Take 1 tablet by mouth At Night As Needed (insomnia / sleep). (Patient taking differently: Take 1 tablet by mouth At Night As Needed (Insomnia / Sleep). OTC), Disp: , Rfl:     memantine (NAMENDA) 10 MG tablet, TAKE 1 TABLET BY MOUTH TWICE  DAILY, Disp: 180 tablet, Rfl: 3    metFORMIN (GLUCOPHAGE) 1000 MG tablet, Take 0.5 tablets by mouth 2 (Two) Times a Day With Meals., Disp: 30 tablet, Rfl: 2    methenamine (HIPREX) 1 g tablet, Take 1 tablet by mouth 2 (Two) Times a Day With Meals., Disp: 180 tablet, Rfl: 1    metoprolol tartrate (LOPRESSOR) 50 MG tablet, Take 1 tablet by mouth Every 12 (Twelve) Hours., Disp: 120 tablet, Rfl: 5    omeprazole (priLOSEC) 20 MG capsule, Take 2 capsules by mouth Every Morning. OTC, Disp: , Rfl:     pravastatin (PRAVACHOL) 40 MG tablet, Take 1 tablet by mouth Every Night., Disp: 90 tablet, Rfl: 1    Probiotic Product (PROBIOTIC ADVANCED PO), Take 1 tablet by mouth 2 (Two) Times a Day. OTC, Disp: , Rfl:     sertraline (ZOLOFT) 50 MG tablet, TAKE 1 TABLET BY MOUTH DAILY, Disp: 90 tablet, Rfl: 3    tiotropium bromide-olodaterol (STIOLTO RESPIMAT) 2.5-2.5 MCG/ACT aerosol solution inhaler, Inhale 2 puffs Daily. 2 inh once a day (Patient taking differently: Inhale 2 puffs Daily.), Disp: 3 each, Rfl: 3   Current outpatient and discharge medications have been reconciled for the patient.  Reviewed by: Pito Way MD    Allergies    Allergies   Allergen Reactions    Sglt2 Inhibitors Other (See Comments)     Recurrent UTI    Xarelto [Rivaroxaban] GI Bleeding     GI bleed    Penicillins Hives     Has tolerated cefepime, ceftriaxone, cefazolin, cefdinir, cefuroxime, cephalexin       Problem List    Patient Active Problem List   Diagnosis    Type 2 diabetes mellitus with stage 3 chronic kidney disease, without long-term  current use of insulin    Gastroesophageal reflux disease with esophagitis    Mixed hyperlipidemia    HTN (hypertension)    Nephrolithiasis    Obstructive sleep apnea syndrome    Permanent atrial fibrillation    CKD (chronic kidney disease) stage 3, GFR 30-59 ml/min    Coronary artery disease involving native coronary artery of native heart without angina pectoris    Acute hypoxic respiratory failure    Nodule of lower lobe of left lung    H/O: GI bleed    Presence of Watchman left atrial appendage closure device    Heart failure with mildly reduced ejection fraction (HFmrEF)    Obesity    Severe malnutrition    Acute on chronic urinary retention    Hydroureteronephrosis    Urethral stricture    Acute posterior epistaxis    Moderate protein-calorie malnutrition    Primary malignant neoplasm of left lower lobe of lung    COPD exacerbation    Rhinovirus infection    Permanent atrial fibrillation    GERD without esophagitis    Gout    Heart failure with mildly reduced ejection fraction (HFmrEF)       Medications, Allergies, Problems List and Past History were reviewed and updated.    Physical Examination    /78 (BP Location: Left arm, Patient Position: Sitting, Cuff Size: Adult)   Pulse 88   Temp 96.4 °F (35.8 °C) (Infrared)   Resp 16   Wt 95.3 kg (210 lb 3.2 oz)   BMI 26.27 kg/m²      Physical Exam  Constitutional:       General: He is not in acute distress.     Appearance: Normal appearance. He is not ill-appearing, toxic-appearing or diaphoretic.   Cardiovascular:      Rate and Rhythm: Normal rate and regular rhythm.      Pulses: Normal pulses.      Heart sounds: Normal heart sounds. No murmur heard.     No friction rub. No gallop.   Pulmonary:      Effort: Pulmonary effort is normal. No respiratory distress.      Breath sounds: No stridor. Wheezing present. No rhonchi or rales.   Chest:      Chest wall: No tenderness.   Neurological:      Mental Status: He is alert.   Psychiatric:         Mood and  Affect: Mood normal.         Behavior: Behavior normal.         Thought Content: Thought content normal.         Judgment: Judgment normal.        Diagnoses and all orders for this visit:    1. Subacute cough (Primary)  -     XR Chest PA & Lateral; Future  -     benzonatate (TESSALON) 200 MG capsule; Take 1 capsule by mouth 3 (Three) Times a Day As Needed for Cough.  Dispense: 30 capsule; Refill: 0  -     predniSONE (DELTASONE) 10 MG tablet; 2 po bid x 5 days; 1 po bid x 5 days; 1 po daily x 5 days; 1/2 po daily x 4 days  Dispense: 37 tablet; Refill: 0       Discussed taking prednisone for symptoms improvement and Tessalon as needed with Mucinex for cough. Continue on all other prescribed medication regimen.The patient was instructed to return for follow-up and weight check in 1 month. The patient was instructed to return sooner if the condition changes, worsens, or does not resolve.       Transitional Care Management Certification  I certify that the following are true:  Communication was made within 2 business days of discharge.  Complexity of Medical Decision Making is moderate.  Face to face visit occurred within 5 days.    *Note: 47834 is for high complexity patients with a face to face visit within 7 days of discharge.  08184 is for high complexity patients with a face to face on days 8-14 post discharge or medium complexity with face to face visit within 14 days post discharge.    Attending Note:   Patient was personally seen and examined by me.  I have obtained and corroborated the history and examination as documented above, and agree with the accuracy of the documented information.  All orders were reviewed and personally signed by me.   Pito Way MD  06:27 EDT  06/10/25

## 2025-06-16 ENCOUNTER — OFFICE VISIT (OUTPATIENT)
Dept: CARDIOLOGY | Facility: HOSPITAL | Age: 89
End: 2025-06-16
Payer: MEDICARE

## 2025-06-16 ENCOUNTER — READMISSION MANAGEMENT (OUTPATIENT)
Dept: CALL CENTER | Facility: HOSPITAL | Age: 89
End: 2025-06-16
Payer: MEDICARE

## 2025-06-16 VITALS
RESPIRATION RATE: 20 BRPM | SYSTOLIC BLOOD PRESSURE: 148 MMHG | HEIGHT: 75 IN | BODY MASS INDEX: 26.65 KG/M2 | DIASTOLIC BLOOD PRESSURE: 97 MMHG | HEART RATE: 80 BPM | WEIGHT: 214.38 LBS | OXYGEN SATURATION: 99 %

## 2025-06-16 DIAGNOSIS — I10 ESSENTIAL HYPERTENSION: ICD-10-CM

## 2025-06-16 DIAGNOSIS — I50.22 HEART FAILURE WITH MILDLY REDUCED EJECTION FRACTION (HFMREF): Primary | ICD-10-CM

## 2025-06-16 DIAGNOSIS — E78.5 HYPERLIPIDEMIA LDL GOAL <100: ICD-10-CM

## 2025-06-16 DIAGNOSIS — J44.1 COPD EXACERBATION: ICD-10-CM

## 2025-06-16 LAB — ABSOLUTE LUNG FLUID CONTENT: 25 % (ref 20–35)

## 2025-06-16 PROCEDURE — 99214 OFFICE O/P EST MOD 30 MIN: CPT | Performed by: NURSE PRACTITIONER

## 2025-06-16 PROCEDURE — 1159F MED LIST DOCD IN RCRD: CPT | Performed by: NURSE PRACTITIONER

## 2025-06-16 PROCEDURE — 94726 PLETHYSMOGRAPHY LUNG VOLUMES: CPT | Performed by: NURSE PRACTITIONER

## 2025-06-16 PROCEDURE — 1160F RVW MEDS BY RX/DR IN RCRD: CPT | Performed by: NURSE PRACTITIONER

## 2025-06-16 RX ORDER — GUAIFENESIN 600 MG/1
600 TABLET, EXTENDED RELEASE ORAL EVERY 12 HOURS SCHEDULED
Qty: 20 TABLET | Refills: 0 | Status: SHIPPED | OUTPATIENT
Start: 2025-06-16

## 2025-06-16 NOTE — PROGRESS NOTES
[facility] Failure [facility]    Date:  06/16/25     Vitals:    06/16/25 1049   BP: 148/97   Pulse: 80   Resp: 20   SpO2: 99%        Indication:  Heart Failure     Procedure:  ReDS device sensor unit applied to right side of chest and right side of back.  Appropriate positioning confirmed based off of the unit's calculation.  Chest measurement obtained with the chest size ruler.  Measurement session performed over 45 seconds.      Method of arrival:  Other wheelchair     Weighing self daily:  Most days    Taking medications as prescribed:  Yes    Edema:  No    Shortness of Air:  No    Number of pillows used at night:  <2    Results:  ReDS Value=       25                   Interpretation:  25-35% - normal/ideal lung fluid content    Emily Lara, BERYL 06/16/25 12:26 EDT        No

## 2025-06-16 NOTE — OUTREACH NOTE
COPD/PN Week 2 Survey      Flowsheet Row Responses   [facility] facility patient discharged from? [place]   Does the patient have one of the following disease processes/diagnoses(primary or secondary)? COPD   Week 2 attempt successful? No   Unsuccessful attempts Attempt 1   Revoke Jason Snyder H - Registered Nurse

## 2025-06-16 NOTE — PROGRESS NOTES
"Chief Complaint  Congestive Heart Failure and Follow-up    Subjective    History of Present Illness {  Problem List  Visit  Diagnosis   Encounters  Notes  Medications  Labs  Result Review Imaging  Media :23}       History of Present Illness   89-year-old male presents to the office today for ongoing evaluation of his HFrEF.  Patient recently hospitalized at [facility] with COPD exacerbation. He has a history of A-fib status post watchman, hypertension, CAD, hyperlipidemia, GERD, left lower lobe lung cancer, gout, obstructive sleep apnea, type 2 diabetes mellitus,, COPD, chronic kidney disease stage III.  Presented with acute hypoxic respiratory failure and rhinovirus.  He was treated with steroids, DuoNebs and Mucinex.  He was discharged home on prednisone and doxycycline and weaned off his oxygen.  He reports dyspnea has resolved however he notes an ongoing cough that is loose but nonproductive.  Weight has been stable and he currently denies pedal edema, chest pain, dizziness, presyncope or syncope.  Objective     Vital Signs:   Vitals:    06/16/25 1049   BP: 148/97   BP Location: Left arm   Patient Position: Sitting   Cuff Size: Adult   Pulse: 80   Resp: 20   SpO2: 99%   Weight: 97.2 kg (214 lb 6 oz)   Height: 190.5 cm (75\")     Body mass index is 26.8 kg/m².  Physical Exam  Vitals and nursing note reviewed.   Constitutional:       Appearance: Normal appearance.   HENT:      Head: Normocephalic.   Eyes:      Pupils: Pupils are equal, round, and reactive to light.   Cardiovascular:      Rate and Rhythm: Normal rate and regular rhythm.      Pulses: Normal pulses.      Heart sounds: Normal heart sounds. No murmur heard.  Pulmonary:      Effort: Pulmonary effort is normal.      Breath sounds: Normal breath sounds.   Abdominal:      General: Bowel sounds are normal.      Palpations: Abdomen is soft.   Musculoskeletal:         General: Normal range of motion.      Cervical back: Normal range " of motion.      Right lower leg: No edema.      Left lower leg: No edema.   Skin:     General: Skin is warm and dry.      Capillary Refill: Capillary refill takes less than 2 seconds.      Findings: Bruising (bilateral arms) present.   Neurological:      Mental Status: He is alert and oriented to person, place, and time.   Psychiatric:         Mood and Affect: Mood normal.         Thought Content: Thought content normal.              Result Review  Data Reviewed:{ Labs  Result Review  Imaging  Med Tab  Media :23}     Lab Results   Component Value Date    GLUCOSE 165 (H) 06/03/2025    CALCIUM 9.3 06/03/2025     06/03/2025    K 3.9 06/03/2025    CO2 29.0 06/03/2025    CL 97 (L) 06/03/2025    BUN 23.2 (H) 06/03/2025    CREATININE 1.09 06/03/2025    EGFR 65.3 06/03/2025    BCR 21.3 06/03/2025    ANIONGAP 11.0 06/03/2025     Lab Results   Component Value Date    WBC 4.34 06/03/2025    HGB 14.9 06/03/2025    HCT 46.6 06/03/2025    MCV 90.8 06/03/2025     06/03/2025     Adult Transesophageal Echo 3D (FARHAD) W/ Cont If Necessary Per Protocol (11/21/2024 15:37)       I have reviewed this documentation, made edits where appropriate, and agree with the final report of ReDS data/interpretation. In addition, I have the following to add:    Lung fluid content by ReDS assessment correlates to pulmonary capillary wedge pressure (Shiv et al. JAHA 2018). In patients with LEFT-sided heart failure, elevated readings suggest that the patient MAY benefit from additional diuresis while low readings suggest that the patient MAY benefit from a reduction in diuretics; clinical correlation is recommended. Low normal and mildly elevated readings may be appropriate for some patients. In patients with RIGHT-sided heart failure, lung fluid content may be a less reliable marker for guiding diuresis.           Assessment and Plan {CC Problem List  Visit Diagnosis  ROS  Review (Popup)  Health Maintenance  Quality  BestPractice   Medications  SmartSets  SnapShot Encounters  Media :23}   1. Heart failure with mildly reduced ejection fraction (HFmrEF)    - ReDs Vest  25  Euvolemic   Stable on digoxin, Lasix, metoprolol  Heart failure education today including signs and symptoms, the role of the heart failure center, daily weights, low sodium diet (less than 1500 mg per day), and daily physical activity. Reviewed HF Zones with patient and family.  Patient to continue current medications as previously ordered.   2. Essential hypertension  Stable on lopressor    3. Hyperlipidemia LDL goal <100  Stable on pravastatin     4. COPD exacerbation  Resume Mucinex 600 mg twice daily  Begin incentive spirometer 10 times 3 times daily          Follow Up {Instructions Charge Capture  Follow-up Communications :23}   Return if symptoms worsen or fail to improve.    Patient was given instructions and counseling regarding his condition or for health maintenance advice. Please see specific information pulled into the AVS if appropriate.  Patient was instructed to call the [facility] with any questions, concerns, or worsening symptoms.

## 2025-06-23 ENCOUNTER — HOSPITAL ENCOUNTER (OUTPATIENT)
Facility: HOSPITAL | Age: 89
Setting detail: RADIATION/ONCOLOGY SERIES
End: 2025-06-23
Payer: MEDICARE

## 2025-06-24 ENCOUNTER — TELEPHONE (OUTPATIENT)
Dept: INTERNAL MEDICINE | Facility: CLINIC | Age: 89
End: 2025-06-24
Payer: MEDICARE

## 2025-06-24 NOTE — TELEPHONE ENCOUNTER
YUE WITH [facility] HAS CALLED TO NOTIFY PCP THAT PATIENT IS WHEEZING. YUE IS ALSO REQUESTING HOME HEALTH ORDERS FOR RESUMPTION OF CARE AND SKILLED NURSING VISITS ONCE EVERY FOUR WEEKS FOR EIGHT WEEKS FOR [facility] CATHETER CHANGES.  PATIENT HAD COUGH WITH MUCUS AND WHEEZING.    CALL BACK NUMBER [phone]

## 2025-06-26 ENCOUNTER — OFFICE VISIT (OUTPATIENT)
Age: 89
End: 2025-06-26
Payer: MEDICARE

## 2025-06-26 ENCOUNTER — PATIENT MESSAGE (OUTPATIENT)
Dept: INTERNAL MEDICINE | Facility: CLINIC | Age: 89
End: 2025-06-26
Payer: MEDICARE

## 2025-06-26 ENCOUNTER — HOSPITAL ENCOUNTER (OUTPATIENT)
Facility: HOSPITAL | Age: 89
Discharge: HOME | End: 2025-06-26
Admitting: RADIOLOGY
Payer: MEDICARE

## 2025-06-26 VITALS
HEART RATE: 96 BPM | SYSTOLIC BLOOD PRESSURE: 137 MMHG | DIASTOLIC BLOOD PRESSURE: 82 MMHG | WEIGHT: 215 LBS | HEIGHT: 75 IN | BODY MASS INDEX: 26.73 KG/M2 | OXYGEN SATURATION: 94 % | TEMPERATURE: 97.8 F | RESPIRATION RATE: 16 BRPM

## 2025-06-26 DIAGNOSIS — R05.2 SUBACUTE COUGH: ICD-10-CM

## 2025-06-26 DIAGNOSIS — R05.2 SUBACUTE COUGH: Primary | ICD-10-CM

## 2025-06-26 DIAGNOSIS — C34.32 MALIGNANT NEOPLASM OF LOWER LOBE OF LEFT LUNG: ICD-10-CM

## 2025-06-26 DIAGNOSIS — C34.32 PRIMARY MALIGNANT NEOPLASM OF LEFT LOWER LOBE OF LUNG: ICD-10-CM

## 2025-06-26 DIAGNOSIS — E78.5 HYPERLIPIDEMIA LDL GOAL <100: Chronic | ICD-10-CM

## 2025-06-26 PROCEDURE — G0463 HOSPITAL OUTPT CLINIC VISIT: HCPCS

## 2025-06-26 PROCEDURE — 71260 CT THORAX DX C+: CPT

## 2025-06-26 PROCEDURE — 74177 CT ABD & PELVIS W/CONTRAST: CPT

## 2025-06-26 PROCEDURE — 25510000001 IOPAMIDOL 61 % SOLUTION: Performed by: RADIOLOGY

## 2025-06-26 RX ORDER — PRAVASTATIN SODIUM 40 MG
40 TABLET ORAL NIGHTLY
Qty: 90 TABLET | Refills: 1 | Status: SHIPPED | OUTPATIENT
Start: 2025-06-26

## 2025-06-26 RX ORDER — IOPAMIDOL 612 MG/ML
100 INJECTION, SOLUTION INTRAVASCULAR
Status: COMPLETED | OUTPATIENT
Start: 2025-06-26 | End: 2025-06-26

## 2025-06-26 RX ORDER — CODEINE PHOSPHATE AND GUAIFENESIN 10; 100 MG/5ML; MG/5ML
5 SOLUTION ORAL 3 TIMES DAILY PRN
Qty: 180 ML | Refills: 0 | Status: SHIPPED | OUTPATIENT
Start: 2025-06-26

## 2025-06-26 RX ORDER — METOPROLOL TARTRATE 50 MG
50 TABLET ORAL EVERY 12 HOURS SCHEDULED
Qty: 180 TABLET | Refills: 1 | Status: SHIPPED | OUTPATIENT
Start: 2025-06-26

## 2025-06-26 RX ORDER — FERROUS SULFATE 325(65) MG
TABLET ORAL
COMMUNITY

## 2025-06-26 RX ADMIN — IOPAMIDOL 85 ML: 612 INJECTION, SOLUTION INTRAVENOUS at 11:50

## 2025-06-26 NOTE — PROGRESS NOTES
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 male with multiple medical comorbidities including COPD, CHF, Afib status post watchman device, hypertension, CKD stage III, and remote tobacco abuse who was incidentally found to have a 2.2 cm left lower lobe pulmonary nodule on CT scan of the chest for work-up of acute hypoxic respiratory failure secondary to Influenza A infection.  The patient was sent for outpatient PET scan, showing the subsolid left lower lobe nodule to be mildly hypermetabolic and concerning for primary malignancy.  There was otherwise no evidence of hypermetabolic hilar/mediastinal lymphadenopathy or distant metastatic disease.  The patient was uninterested in risks associated with biopsy.  The patient was not considered a candidate for surgery.  The patient underwent definitive empiric treatment with a course of SBRT consisting of 50 Gy in 5 fractions delievered on the [employer] Radixact.  He completed treatments 2023 and was noted on post-treatment imaging to have had a good response.      Recent surveillance CT scan of the chest performed 2024 demonstrated a moderate left pleural effusion with left basilar atelectasis which obscures complete visualization of the region of the previously treated left lower lobe lung nodule.  For this reason, a short-interval CT scan of the chest was recommended.  The patient returns today having undergone repeat CT scan of the chest on 3/10/2025.      The patient was hospitalized for  massive epistaxis requiring embolization.  The patient had a PET scan that appeared to show regional recurrence in the left lower lobe of the lung.  The patient underwent radiation treatments which she completed on 5/8/2025.  The patient was treated with a 50 Gray in 5 fractions.  The patient had issues with increased pleural effusion and possibly a pneumonia during his radiation treatments.  He required a short break with antibiotics and steroids and then was able to complete his treatments.    The patient was hospitalized with concern for another pneumonia following treatments.  He is tapering off of the steroids and reports that he is feeling better and better.    Patient had scans today and reports to discuss the results of the scans.          Current Outpatient Medications:     allopurinol (ZYLOPRIM) 300 MG tablet, TAKE 1 TABLET BY MOUTH DAILY, Disp: 90 tablet, Rfl: 3    ascorbic acid (VITAMIN C) 1000 MG tablet, Take 1 tablet by mouth Daily. (Patient taking differently: Take 1 tablet by mouth 2 (Two) Times a Day. OTC), Disp: , Rfl:     aspirin 81 MG EC tablet, Take 1 tablet by mouth Daily. (Patient taking differently: Take 1 tablet by mouth Daily. OTC), Disp: , Rfl:     benzonatate (TESSALON) 200 MG capsule, Take 1 capsule by mouth 3 (Three) Times a Day As Needed for Cough., Disp: 30 capsule, Rfl: 0    Coenzyme Q10 300 MG capsule, Take 1 capsule by mouth Every Night. OTC, Disp: , Rfl:     digoxin (LANOXIN) 125 MCG tablet, Take 1 tablet by mouth Daily., Disp: 30 tablet, Rfl: 11    docusate sodium (COLACE) 100 MG capsule, Take 2 capsules by mouth Daily. (Patient taking differently: Take 2 capsules by mouth Daily As Needed for Constipation. OTC), Disp: , Rfl:     donepezil (ARICEPT) 10 MG tablet, TAKE 1 TABLET BY MOUTH AT NIGHT, Disp: 90 tablet, Rfl: 3    ferrous sulfate (SM Iron) 325 (65 FE) MG tablet, iron unspecified unspecified, Disp: , Rfl:     finasteride (PROSCAR) 5 MG tablet, TAKE 1 TABLET BY MOUTH EVERY   NIGHT AT BEDTIME, Disp: 90 tablet, Rfl: 3    furosemide (LASIX) 40 MG tablet, Take 1 tablet by mouth Daily., Disp: 90 tablet, Rfl: 1    melatonin 5 MG tablet tablet, Take 1 tablet by mouth At Night As Needed (insomnia / sleep). (Patient taking differently: Take 1 tablet by mouth At Night As Needed (Insomnia / Sleep). OTC), Disp: , Rfl:     memantine (NAMENDA) 10 MG tablet, TAKE 1 TABLET BY MOUTH TWICE  DAILY, Disp: 180 tablet, Rfl: 3    metFORMIN (GLUCOPHAGE) 1000 MG tablet, Take 0.5 tablets by mouth 2 (Two) Times a Day With Meals., Disp: 30 tablet, Rfl: 2    methenamine (HIPREX) 1 g tablet, Take 1 tablet by mouth 2 (Two) Times a Day With Meals., Disp: 180 tablet, Rfl: 1    metoprolol tartrate (LOPRESSOR) 50 MG tablet, Take 1 tablet by mouth Every 12 (Twelve) Hours., Disp: 120 tablet, Rfl: 5    omeprazole (priLOSEC) 20 MG capsule, Take 2 capsules by mouth Every Morning. OTC, Disp: , Rfl:     pravastatin (PRAVACHOL) 40 MG tablet, Take 1 tablet by mouth Every Night., Disp: 90 tablet, Rfl: 1    predniSONE (DELTASONE) 10 MG tablet, 2 po bid x 5 days; 1 po bid x 5 days; 1 po daily x 5 days; 1/2 po daily x 4 days, Disp: 37 tablet, Rfl: 0    Probiotic Product (PROBIOTIC ADVANCED PO), Take 1 tablet by mouth 2 (Two) Times a Day. OTC, Disp: , Rfl:     sertraline (ZOLOFT) 50 MG tablet, TAKE 1 TABLET BY MOUTH DAILY, Disp: 90 tablet, Rfl: 3    tiotropium bromide-olodaterol (STIOLTO RESPIMAT) 2.5-2.5 MCG/ACT aerosol solution inhaler, Inhale 2 puffs Daily. 2 inh once a day (Patient taking differently: Inhale 2 puffs Daily.), Disp: 3 each, Rfl: 3    albuterol sulfate  (90 Base) MCG/ACT inhaler, USE 2 INHALATIONS BY MOUTH EVERY 4 HOURS AS NEEDED FOR WHEEZING (Patient not taking: Reported on 6/26/2025), Disp: 51 g, Rfl: 3    guaiFENesin (MUCINEX) 600 MG 12 hr tablet, Take 1 tablet by mouth Every 12 (Twelve) Hours. (Patient not taking: Reported on 6/26/2025), Disp: 20 tablet, Rfl: 0  No current facility-administered medications  for this visit.      Review of Systems   Constitutional:  Negative for appetite change, chills, fatigue, fever and unexpected weight change.   HENT:  Negative.  Negative for sore throat and trouble swallowing.    Eyes: Negative.         Right eye red and watery; swollen     Respiratory:  Positive for cough, shortness of breath (with walking longer distances) and wheezing (since most recent hospitalization). Negative for chest tightness and hemoptysis.    Cardiovascular:  Negative for chest pain and leg swelling.   Gastrointestinal:  Positive for constipation. Negative for abdominal pain, diarrhea, nausea and vomiting.   Endocrine: Negative.    Genitourinary: Negative.          Indwelling catheter connected to leg bag     Musculoskeletal:  Positive for gait problem. Negative for arthralgias and myalgias.   Skin: Negative.    Neurological:  Positive for extremity weakness and gait problem. Negative for dizziness, headaches and light-headedness.   Hematological:  Bruises/bleeds easily.   Psychiatric/Behavioral:  Negative for depression and sleep disturbance. The patient is not nervous/anxious.    All other systems reviewed and are negative.      Karnofsky score: 80         Physical Exam  Vitals and nursing note reviewed.   Constitutional:       General: He is not in acute distress.     Appearance: Normal appearance. He is well-developed.      Comments: Pleasant elderly gentleman sitting upright in wheelchair no apparent distress.   HENT:      Head: Normocephalic and atraumatic.   Eyes:      Conjunctiva/sclera: Conjunctivae normal.      Pupils: Pupils are equal, round, and reactive to light.      Comments: Right eye red appears somewhat irritated with crust along the eyelashes.   Cardiovascular:      Rate and Rhythm: Normal rate and regular rhythm.      Heart sounds: No murmur heard.     No friction rub.   Pulmonary:      Effort: Pulmonary effort is normal. No respiratory distress.      Breath sounds: Normal breath  "sounds. No wheezing.   Genitourinary:     Comments: Indwelling f/c connected to legbag  Musculoskeletal:         General: Normal range of motion.      Cervical back: Normal range of motion and neck supple.   Lymphadenopathy:      Cervical: No cervical adenopathy.   Skin:     General: Skin is warm and dry.   Neurological:      Mental Status: He is alert and oriented to person, place, and time.   Psychiatric:         Behavior: Behavior normal.         Thought Content: Thought content normal.         Judgment: Judgment normal.       VITAL SIGNS:   Vitals:    06/26/25 1355   BP: 137/82   Pulse: 96   Resp: 16   Temp: 97.8 °F (36.6 °C)   TempSrc: Oral   SpO2: 94%   Weight: 97.5 kg (215 lb)   Height: 190.5 cm (75\")   PainSc: 0-No pain           IMAGING:    Karnofsky score: 80     The following portions of the patient's history were reviewed and updated as appropriate: allergies, current medications, past family history, past medical history, past social history, past surgical history and problem list.     I have personally requested reviewed and interpreted the patient's images and radiology reports and pathology reports listed below:    XR Chest PA & Lateral  Result Date: 6/9/2025  Impression: No acute cardiopulmonary disease. Electronically Signed: Jose L Mitchell MD  6/9/2025 3:26 PM EDT  Workstation ID: [id number]    XR Chest 1 View  Result Date: 6/2/2025  Impression: 1. Stable elevation of the left hemidiaphragm with left basilar airspace disease and small left pleural effusion. 2. Left basilar pulmonary nodule better assessed on recent chest CT and PET/CT. 3. Clear right lung. Electronically Signed: Gary Oliva MD  6/2/2025 1:59 PM EDT  Workstation ID: [id number]    NM PET/CT Skull Base to Mid Thigh  Result Date: 4/1/2025  Impression: 1. Intensely hypermetabolic activity in an approximately 2.5 cm mass partially embedded in the upper margin of the patient's persistent left lower lobe atelectasis. 2. Probably incidental " "increased activity in what appears to be a healing anterior right fifth rib fracture. Please correlate with history of trauma and current patient's symptoms. 3. No evidence of potential malignancy/metastasis elsewhere. Electronically Signed: Oliverio Younger MD  4/1/2025 9:42 AM EDT  Workstation ID: [id number]    CT Chest Without Contrast Diagnostic  Result Date: 3/13/2025  Impression: 1. Worsening medial left lower lung consolidation with findings concerning for interval increase in size of a left lower lung lesion with postobstructive consolidative changes and pleural effusion. Electronically Signed: Deanne Riley MD  3/13/2025 11:41 AM EDT  Workstation ID: [id number]    Invasive peripheral vascular study  Result Date: 3/5/2025  Unremarkable carotid and cerebral angiograms.  Specifically, there is no active contrast extravasation, pseudoaneurysm, vascular lesion, or other \"culprit\" lesion to account for the patient's recurrent, refractory left-sided epistaxis.  Given the recurrent/refractory epistaxis, the distal bilateral internal maxillary arteries were prophylactically embolized with PVA particles and Target microcoil's, as detailed above.     I reviewed the patient CT scan of the chest abdomen pelvis from today: It does appear that the patient's lesion has responded but he does have a fair amount of consolidation and scarring in the left lower lobe segment.  This is consistent with treatment effect.         IMPRESSION:  Darien Camarena is an 89 y.o. gentleman with history of a suspected early stage primary bronchogenic carcinoma of the left lower lobe of lung.  The patient was uninterested in biopsy.  The patient underwent definitive, empiric SBRT to this lesion, completing 2/24/2023.  The patient had a good response with evidence of fibrosis around the lesion on subsequent imaging.  The patient has CT scan 3/11/2025 that showed post radiation consolidative changes in the left lower lobe but there was some increased " nodularity.  The patient's PET scan does appear to show disease in recurrent to an area directly adjacent to what was treated previously.    The patient underwent repeat SBRT to an adjacent area in the left lower lobe of the lung.  Patient had some issues with fluid accumulation during his treatments.  He was able to complete his treatments with orbital Lasix and steroids.    The patient has been hospitalized with pneumonia but is now recovering.  He is tapering off of prednisone.  The patient does have a cough that does keep him up some nights.  I recommend codeine cough syrup.  We discussed the risk of constipation and potential confusion and recommended him to be very cautious about taking it if he develops the either of the symptoms.    Patient reports that he has some constipation at baseline.    Given the appearance of the patient's CAT scan I would recommend repeating a PET scan in 3 months.  The patient does have some indication of an early treatment response but he does still have a lot of scar tissue and will be very difficult to determine what is potentially tumor versus scarring.    I will see the patient back with PET scan in 3 months.    I spent 35 minutes on the patient's case today.      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    Right anterior fifth rib lesion  -Appears to be healing fracture  -Evidence of a fracture line on the scans  -Avidity less than primary  -Does have a  history of a fall although he reports is mostly his left side that was impacted  - Read as a healing benign fracture on PET scan    Left lower lobe primary bronchogenic carcinoma  -cT1b N0 M0   -Radiographic diagnosis  -Hypermetabolic on PET scan  -No evidence of regional or distant metastatic disease  -Patient uninterested in biopsy  -Status post definitive empiric SBRT on the Radixact, 50 Gy in 5 fractions              -completed 2/24/2023    CHF   -Discussed relationship between fluid volume overload and progressive lung symptoms such as dyspnea, cough, etc.   -Discussed CHF as common cause of pleural effusions in the lungs   -Continues Lasix 20 mg daily  -Continues follow up with cardiology      CKD Stage III  -Stable  -Recommend judicious use of contrast with scans      Epistaxis  -Hospitalized 3/3-3/5/2025  -Status post rhino rocket and embolization with ENT  -H&H stabilized following intervention  -No further bleeding reported  -ASA held    -will follow up with cardiology and ENT recs to resume  -Follows with ENT       No follow-ups on file.    Abbe Leary MD

## 2025-06-27 DIAGNOSIS — C34.32 PRIMARY MALIGNANT NEOPLASM OF LEFT LOWER LOBE OF LUNG: Primary | ICD-10-CM

## 2025-07-09 ENCOUNTER — OUTSIDE FACILITY SERVICE (OUTPATIENT)
Dept: INTERNAL MEDICINE | Facility: CLINIC | Age: 89
End: 2025-07-09
Payer: MEDICARE

## 2025-07-09 PROCEDURE — G0179 MD RECERTIFICATION HHA PT: HCPCS | Performed by: INTERNAL MEDICINE

## 2025-08-05 ENCOUNTER — OFFICE VISIT (OUTPATIENT)
Dept: INTERNAL MEDICINE | Facility: CLINIC | Age: 89
End: 2025-08-05
Payer: MEDICARE

## 2025-08-05 VITALS
WEIGHT: 214 LBS | RESPIRATION RATE: 18 BRPM | BODY MASS INDEX: 26.75 KG/M2 | HEART RATE: 88 BPM | DIASTOLIC BLOOD PRESSURE: 58 MMHG | SYSTOLIC BLOOD PRESSURE: 118 MMHG

## 2025-08-05 DIAGNOSIS — R31.9 HEMATURIA, UNSPECIFIED TYPE: ICD-10-CM

## 2025-08-05 DIAGNOSIS — K21.00 GASTROESOPHAGEAL REFLUX DISEASE WITH ESOPHAGITIS WITHOUT HEMORRHAGE: ICD-10-CM

## 2025-08-05 DIAGNOSIS — R82.998 LEUKOCYTES IN URINE: ICD-10-CM

## 2025-08-05 DIAGNOSIS — D50.9 IRON DEFICIENCY ANEMIA, UNSPECIFIED IRON DEFICIENCY ANEMIA TYPE: ICD-10-CM

## 2025-08-05 DIAGNOSIS — I10 ESSENTIAL HYPERTENSION: ICD-10-CM

## 2025-08-05 DIAGNOSIS — E78.5 HYPERLIPIDEMIA LDL GOAL <100: ICD-10-CM

## 2025-08-05 DIAGNOSIS — E11.9 TYPE 2 DIABETES MELLITUS WITHOUT COMPLICATION, WITHOUT LONG-TERM CURRENT USE OF INSULIN: Primary | ICD-10-CM

## 2025-08-05 LAB
BASOPHILS # BLD AUTO: 0.08 10*3/MM3 (ref 0–0.2)
BASOPHILS NFR BLD AUTO: 0.9 % (ref 0–1.5)
BILIRUB BLD-MCNC: NEGATIVE MG/DL
CLARITY, POC: ABNORMAL
COLOR UR: YELLOW
DEPRECATED RDW RBC AUTO: 53.1 FL (ref 37–54)
EOSINOPHIL # BLD AUTO: 0.38 10*3/MM3 (ref 0–0.4)
EOSINOPHIL NFR BLD AUTO: 4.3 % (ref 0.3–6.2)
ERYTHROCYTE [DISTWIDTH] IN BLOOD BY AUTOMATED COUNT: 15.5 % (ref 12.3–15.4)
EXPIRATION DATE: ABNORMAL
GLUCOSE UR STRIP-MCNC: NEGATIVE MG/DL
HBA1C MFR BLD: 6.2 % (ref 4.8–5.6)
HCT VFR BLD AUTO: 47.3 % (ref 37.5–51)
HGB BLD-MCNC: 15.5 G/DL (ref 13–17.7)
IMM GRANULOCYTES # BLD AUTO: 0.04 10*3/MM3 (ref 0–0.05)
IMM GRANULOCYTES NFR BLD AUTO: 0.5 % (ref 0–0.5)
KETONES UR QL: NEGATIVE
LEUKOCYTE EST, POC: ABNORMAL
LYMPHOCYTES # BLD AUTO: 1.23 10*3/MM3 (ref 0.7–3.1)
LYMPHOCYTES NFR BLD AUTO: 13.9 % (ref 19.6–45.3)
Lab: ABNORMAL
MCH RBC QN AUTO: 30.8 PG (ref 26.6–33)
MCHC RBC AUTO-ENTMCNC: 32.8 G/DL (ref 31.5–35.7)
MCV RBC AUTO: 94 FL (ref 79–97)
MONOCYTES # BLD AUTO: 0.64 10*3/MM3 (ref 0.1–0.9)
MONOCYTES NFR BLD AUTO: 7.2 % (ref 5–12)
NEUTROPHILS NFR BLD AUTO: 6.47 10*3/MM3 (ref 1.7–7)
NEUTROPHILS NFR BLD AUTO: 73.2 % (ref 42.7–76)
NITRITE UR-MCNC: POSITIVE MG/ML
NRBC BLD AUTO-RTO: 0 /100 WBC (ref 0–0.2)
PH UR: 5 [PH] (ref 5–8)
PLATELET # BLD AUTO: 203 10*3/MM3 (ref 140–450)
PMV BLD AUTO: 10.4 FL (ref 6–12)
PROT UR STRIP-MCNC: ABNORMAL MG/DL
RBC # BLD AUTO: 5.03 10*6/MM3 (ref 4.14–5.8)
RBC # UR STRIP: ABNORMAL /UL
SP GR UR: 1.01 (ref 1–1.03)
UROBILINOGEN UR QL: NORMAL
WBC NRBC COR # BLD AUTO: 8.84 10*3/MM3 (ref 3.4–10.8)

## 2025-08-05 PROCEDURE — 80061 LIPID PANEL: CPT | Performed by: INTERNAL MEDICINE

## 2025-08-05 PROCEDURE — 1126F AMNT PAIN NOTED NONE PRSNT: CPT | Performed by: INTERNAL MEDICINE

## 2025-08-05 PROCEDURE — 36415 COLL VENOUS BLD VENIPUNCTURE: CPT | Performed by: INTERNAL MEDICINE

## 2025-08-05 PROCEDURE — 81015 MICROSCOPIC EXAM OF URINE: CPT | Performed by: INTERNAL MEDICINE

## 2025-08-05 PROCEDURE — 99214 OFFICE O/P EST MOD 30 MIN: CPT | Performed by: INTERNAL MEDICINE

## 2025-08-05 PROCEDURE — 80053 COMPREHEN METABOLIC PANEL: CPT | Performed by: INTERNAL MEDICINE

## 2025-08-05 PROCEDURE — 87086 URINE CULTURE/COLONY COUNT: CPT | Performed by: INTERNAL MEDICINE

## 2025-08-05 PROCEDURE — 81003 URINALYSIS AUTO W/O SCOPE: CPT | Performed by: INTERNAL MEDICINE

## 2025-08-05 PROCEDURE — 83036 HEMOGLOBIN GLYCOSYLATED A1C: CPT | Performed by: INTERNAL MEDICINE

## 2025-08-05 PROCEDURE — 85025 COMPLETE CBC W/AUTO DIFF WBC: CPT | Performed by: INTERNAL MEDICINE

## 2025-08-05 PROCEDURE — 87186 SC STD MICRODIL/AGAR DIL: CPT | Performed by: INTERNAL MEDICINE

## 2025-08-06 DIAGNOSIS — R33.9 ACUTE ON CHRONIC URINARY RETENTION: ICD-10-CM

## 2025-08-06 LAB
ALBUMIN SERPL-MCNC: 3.4 G/DL (ref 3.5–5.2)
ALBUMIN/GLOB SERPL: 0.9 G/DL
ALP SERPL-CCNC: 163 U/L (ref 39–117)
ALT SERPL W P-5'-P-CCNC: 12 U/L (ref 1–41)
ANION GAP SERPL CALCULATED.3IONS-SCNC: 11.4 MMOL/L (ref 5–15)
AST SERPL-CCNC: 23 U/L (ref 1–40)
BACTERIA UR QL AUTO: ABNORMAL /HPF
BILIRUB SERPL-MCNC: 1 MG/DL (ref 0–1.2)
BUN SERPL-MCNC: 14 MG/DL (ref 8–23)
BUN/CREAT SERPL: 14.4 (ref 7–25)
CALCIUM SPEC-SCNC: 9.4 MG/DL (ref 8.6–10.5)
CHLORIDE SERPL-SCNC: 99 MMOL/L (ref 98–107)
CHOLEST SERPL-MCNC: 109 MG/DL (ref 0–200)
CO2 SERPL-SCNC: 30.6 MMOL/L (ref 22–29)
COD CRY URNS QL: PRESENT /HPF
CREAT SERPL-MCNC: 0.97 MG/DL (ref 0.76–1.27)
EGFRCR SERPLBLD CKD-EPI 2021: 75.1 ML/MIN/1.73
GLOBULIN UR ELPH-MCNC: 3.8 GM/DL
GLUCOSE SERPL-MCNC: 136 MG/DL (ref 65–99)
HDLC SERPL-MCNC: 29 MG/DL (ref 40–60)
HYALINE CASTS UR QL AUTO: ABNORMAL /LPF
LDLC SERPL CALC-MCNC: 52 MG/DL (ref 0–100)
LDLC/HDLC SERPL: 1.61 {RATIO}
POTASSIUM SERPL-SCNC: 3.5 MMOL/L (ref 3.5–5.2)
PROT SERPL-MCNC: 7.2 G/DL (ref 6–8.5)
RBC # UR STRIP: ABNORMAL /HPF
REF LAB TEST METHOD: ABNORMAL
SODIUM SERPL-SCNC: 141 MMOL/L (ref 136–145)
SQUAMOUS #/AREA URNS HPF: ABNORMAL /HPF
TRIGL SERPL-MCNC: 167 MG/DL (ref 0–150)
VLDLC SERPL-MCNC: 28 MG/DL (ref 5–40)
WBC # UR STRIP: ABNORMAL /HPF
WBC CLUMPS # UR AUTO: PRESENT /HPF

## 2025-08-06 RX ORDER — OMEPRAZOLE 20 MG/1
40 CAPSULE, DELAYED RELEASE ORAL EVERY MORNING
Qty: 180 CAPSULE | Refills: 1 | Status: SHIPPED | OUTPATIENT
Start: 2025-08-06

## 2025-08-06 RX ORDER — METHENAMINE HIPPURATE 1000 MG/1
1 TABLET ORAL 2 TIMES DAILY WITH MEALS
Qty: 180 TABLET | Refills: 1 | Status: SHIPPED | OUTPATIENT
Start: 2025-08-06

## 2025-08-08 LAB — BACTERIA SPEC AEROBE CULT: ABNORMAL

## 2025-08-11 RX ORDER — NITROFURANTOIN 25; 75 MG/1; MG/1
100 CAPSULE ORAL 2 TIMES DAILY
Qty: 20 CAPSULE | Refills: 0 | Status: SHIPPED | OUTPATIENT
Start: 2025-08-11 | End: 2025-08-21

## (undated) DEVICE — CATH TEMPO 5F SIM 2 100CM: Brand: TEMPO

## (undated) DEVICE — GW JAG STR .035IN 450CM

## (undated) DEVICE — ST EXT IV SMRTSTE 2VLV FIX M LL 6ML 41

## (undated) DEVICE — TRIPLE LUMEN SPHINCTEROTOME: Brand: ULTRATOME XL

## (undated) DEVICE — NDL ANGIOGR ADV THN SMOTH SGLWALL 21G 1.5

## (undated) DEVICE — STPCK 3/WY HP M/RA W/OFF/HNDL 1050PSI STRL

## (undated) DEVICE — CONTN GRAD MEAS TRIANG 32OZ BLK

## (undated) DEVICE — ST INF PRI SMRTSTE 20DRP 2VLV 24ML 117

## (undated) DEVICE — INTRO ACCSR BLNT TP

## (undated) DEVICE — SINGLE USE DISTAL COVER MAJ-2315: Brand: SINGLE USE DISTAL COVER

## (undated) DEVICE — LIMB HOLDER, WRIST/ANKLE: Brand: DEROYAL

## (undated) DEVICE — CATH DIAG EXPO M/ PK 6FR FL4/FR4 PIG 3PK

## (undated) DEVICE — PK CATH CARD 10

## (undated) DEVICE — GW INQWIRE FC PTFE STD J/1.5 .035 260

## (undated) DEVICE — SAFELINER SUCTION CANISTER 1000CC: Brand: DEROYAL

## (undated) DEVICE — ST LINER SAFECAP GRN RED CP STRL

## (undated) DEVICE — 1 X VERSACROSS CONNECT TRANSSEPTAL DILATOR (INCLUDING 1 X J-TIP MECHANICAL GUIDEWIRE); 1 X VERSACROSS RF WIRE (INCLUDING 1 X CONNECTOR CABLE (SINGLE USE)); 1 X DISPERSIVE ELECTRODE: Brand: VERSACROSS CONNECT LAAC ACCESS SOLUTION

## (undated) DEVICE — ADULT NASAL CO2 SAMPLING WITH O2 DELIVERY CANNULA FOR CAPNOFLEX MODULE: Brand: VITAL SIGNS™

## (undated) DEVICE — CATH DIAG EXPO .052 PIG145 6F 110CM

## (undated) DEVICE — GW DIAG .032

## (undated) DEVICE — LUBE JELLY FOIL PACK 1.4 OZ: Brand: MEDLINE INDUSTRIES, INC.

## (undated) DEVICE — DOME MONITORING W BONDED STPCK BIOTRANS2

## (undated) DEVICE — THE BITE BLOCK MAXI, LATEX FREE STRAP IS USED TO PROTECT THE ENDOSCOPE INSERTION TUBE FROM BEING BITTEN BY THE PATIENT.

## (undated) DEVICE — KT ORCA ORCAPOD DISP STRL

## (undated) DEVICE — HYBRID CO2 TUBING/CAP SET FOR OLYMPUS® SCOPES & CO2 SOURCE: Brand: ERBE

## (undated) DEVICE — SNAR POLYP CAPTIVATOR RND STFF 2.4 240CM 10MM 1P/U

## (undated) DEVICE — RADIFOCUS TORQUE DEVICE MULTI-TORQUE VISE: Brand: RADIFOCUS TORQUE DEVICE

## (undated) DEVICE — ROTATING HEMOSTATIC VALVE .096": Brand: RHV

## (undated) DEVICE — PINNACLE INTRODUCER SHEATH: Brand: PINNACLE

## (undated) DEVICE — SOL NACL 0.9PCT 1000ML

## (undated) DEVICE — Device: Brand: DEFENDO AIR/WATER/SUCTION AND BIOPSY VALVE

## (undated) DEVICE — FIRST STEP BEDSIDE ADD WATER KIT - RESEALABLE STAND-UP POUCH, ENDOSCOPIC CLEANING PAD - 1 POUCH: Brand: FIRST STEP BEDSIDE ADD WATER KIT - RESEALABLE STAND-UP POUCH, ENDOSCOPIC CLEANIN

## (undated) DEVICE — ST EXT IV SMARTSITE PINCH/CLMP 5ML 46CM

## (undated) DEVICE — TUBING, SUCTION, 1/4" X 10', STRAIGHT: Brand: MEDLINE

## (undated) DEVICE — INTRO SHEATH ENGAGE W/50 GW .038 9F12

## (undated) DEVICE — KT MANIFLD EP

## (undated) DEVICE — SOLIDIFIER LIQ PREMISORB 1500CC

## (undated) DEVICE — LEX ELECTRO PHYSIOLOGY: Brand: MEDLINE INDUSTRIES, INC.

## (undated) DEVICE — ENDOGATOR HYBRID TUBING KIT FOR USE WITH ENDOGATOR IRRIGATION PUMP, OLYMPUS PUMP, GI4000 ESU, AND TORRENT IRRIGATION PUMP.: Brand: ENDOGATOR KIT

## (undated) DEVICE — LEX NEURO ANGIOGRAPHY: Brand: MEDLINE INDUSTRIES, INC.

## (undated) DEVICE — SYR LUERLOK 50ML

## (undated) DEVICE — AIR/WATER CLEANING VALVES: Brand: AIR/WATER CLEANING VALVES

## (undated) DEVICE — PAD,ARMBOARD,CONV,FOAM,2X8X20",12PR/CS: Brand: MEDLINE

## (undated) DEVICE — MODEL AT P65, P/N 701554-001KIT CONTENTS: HAND CONTROLLER, 3-WAY HIGH-PRESSURE STOPCOCK WITH ROTATING END AND PREMIUM HIGH-PRESSURE TUBING: Brand: ANGIOTOUCH® KIT

## (undated) DEVICE — ACCESS SHEATH WITH DILATOR: Brand: WATCHMAN FXD CURVE™ ACCESS SYSTEM

## (undated) DEVICE — CATH DIAG EXPO .056 FL3.5 6F 100CM

## (undated) DEVICE — DEV COMP RAD PRELUDESYNC 24CM

## (undated) DEVICE — SOL IRR H2O BTL 1000ML STRL

## (undated) DEVICE — TRIPLE LUMEN ERCP CANNULA: Brand: TANDEM XL

## (undated) DEVICE — ADULT, W/LG. BACK PAD, RADIOTRANSPARENT ELEMENT AND LEAD WIRE COMPATIBLE W/: Brand: DEFIBRILLATION ELECTRODES

## (undated) DEVICE — DECANT BG O JET

## (undated) DEVICE — CVR PROB ULTRASND/TRANSD W/GEL 7X11IN STRL

## (undated) DEVICE — RADIFOCUS GLIDEWIRE: Brand: GLIDEWIRE

## (undated) DEVICE — CONTRL CONTRST CHMBRD W/TBG72IN REUS

## (undated) DEVICE — SYS CLS VASC/VENI VASCADE MVP 6TO12F

## (undated) DEVICE — CATH ULTRASND ECHO ACUNAV FOR ACUSON 8F 90CM

## (undated) DEVICE — SET PRIMARY GRVTY 10DP MALE LL 104IN

## (undated) DEVICE — KT CATH GUIDE BENCHMARK 071 STR 95CM W/CATH SELECT 5F

## (undated) DEVICE — DETACHMENT SYSTEM: Brand: INZONE

## (undated) DEVICE — "MH-443 SUCTION VALVE F/EVIS140 EVIS160": Brand: SUCTION VALVE

## (undated) DEVICE — GLIDESHEATH BASIC HYDROPHILIC COATED INTRODUCER SHEATH: Brand: GLIDESHEATH

## (undated) DEVICE — MODEL BT2000 P/N 700287-012KIT CONTENTS: MANIFOLD WITH SALINE AND CONTRAST PORTS, SALINE TUBING WITH SPIKE AND HAND SYRINGE, TRANSDUCER: Brand: BT2000 AUTOMATED MANIFOLD KIT

## (undated) DEVICE — GUIDEWIRE WITH ICE™ HYDROPHILIC COATING: Brand: TRANSEND™ EX

## (undated) DEVICE — BOWL UTIL STRL 32OZ

## (undated) DEVICE — Device: Brand: MEDEX

## (undated) DEVICE — SYR LL TP 10ML STRL

## (undated) DEVICE — GLIDESHEATH SLENDER STAINLESS STEEL KIT: Brand: GLIDESHEATH SLENDER

## (undated) DEVICE — CATH SELCT NEURON SIM 5F 130CM

## (undated) DEVICE — CATH MIC RENEGADE 2MARK 3F 10X150CM

## (undated) DEVICE — DRSNG SURESITE123 4X4.8IN